# Patient Record
Sex: FEMALE | Race: WHITE | Employment: OTHER | URBAN - METROPOLITAN AREA
[De-identification: names, ages, dates, MRNs, and addresses within clinical notes are randomized per-mention and may not be internally consistent; named-entity substitution may affect disease eponyms.]

---

## 2017-02-10 ENCOUNTER — HOSPITAL ENCOUNTER (EMERGENCY)
Age: 37
Discharge: HOME | End: 2017-02-10
Attending: EMERGENCY MEDICINE
Payer: MEDICARE

## 2017-02-10 ENCOUNTER — APPOINTMENT (OUTPATIENT)
Dept: CT IMAGING | Age: 37
End: 2017-02-10
Payer: MEDICARE

## 2017-02-10 VITALS
TEMPERATURE: 97.7 F | SYSTOLIC BLOOD PRESSURE: 147 MMHG | OXYGEN SATURATION: 100 % | HEART RATE: 93 BPM | DIASTOLIC BLOOD PRESSURE: 86 MMHG | RESPIRATION RATE: 20 BRPM

## 2017-02-10 DIAGNOSIS — I10 ESSENTIAL HYPERTENSION: ICD-10-CM

## 2017-02-10 DIAGNOSIS — L03.311 CELLULITIS, ABDOMINAL WALL: Primary | ICD-10-CM

## 2017-02-10 DIAGNOSIS — K45.8 OTHER SPECIFIED ABDOMINAL HERNIA WITHOUT OBSTRUCTION OR GANGRENE: ICD-10-CM

## 2017-02-10 LAB
-: ABNORMAL
ABSOLUTE EOS #: 0.12 K/UL (ref 0–0.4)
ABSOLUTE LYMPH #: 1.28 K/UL (ref 1–4.8)
ABSOLUTE MONO #: 0.64 K/UL (ref 0.1–0.8)
ALBUMIN SERPL-MCNC: 3.7 G/DL (ref 3.5–5.2)
ALBUMIN/GLOBULIN RATIO: 1.2 (ref 1–2.5)
ALP BLD-CCNC: 138 U/L (ref 35–104)
ALT SERPL-CCNC: 23 U/L (ref 5–33)
AMORPHOUS: ABNORMAL
ANION GAP SERPL CALCULATED.3IONS-SCNC: 14 MMOL/L (ref 9–17)
AST SERPL-CCNC: 19 U/L
BACTERIA: ABNORMAL
BASOPHILS # BLD: 0 % (ref 0–2)
BASOPHILS ABSOLUTE: 0 K/UL (ref 0–0.2)
BILIRUB SERPL-MCNC: 0.2 MG/DL (ref 0.3–1.2)
BILIRUBIN URINE: NEGATIVE
BUN BLDV-MCNC: 14 MG/DL (ref 6–20)
BUN/CREAT BLD: ABNORMAL (ref 9–20)
CALCIUM SERPL-MCNC: 8.9 MG/DL (ref 8.6–10.4)
CASTS UA: ABNORMAL /LPF (ref 0–8)
CHLORIDE BLD-SCNC: 104 MMOL/L (ref 98–107)
CO2: 22 MMOL/L (ref 20–31)
COLOR: YELLOW
CREAT SERPL-MCNC: 0.58 MG/DL (ref 0.5–0.9)
CRYSTALS, UA: ABNORMAL /HPF
DIFFERENTIAL TYPE: ABNORMAL
EOSINOPHILS RELATIVE PERCENT: 2 % (ref 1–4)
EPITHELIAL CELLS UA: ABNORMAL /HPF (ref 0–5)
GFR AFRICAN AMERICAN: >60 ML/MIN
GFR NON-AFRICAN AMERICAN: >60 ML/MIN
GFR SERPL CREATININE-BSD FRML MDRD: ABNORMAL ML/MIN/{1.73_M2}
GFR SERPL CREATININE-BSD FRML MDRD: ABNORMAL ML/MIN/{1.73_M2}
GLUCOSE BLD-MCNC: 207 MG/DL (ref 70–99)
GLUCOSE URINE: NEGATIVE
HCT VFR BLD CALC: 40.1 % (ref 36–46)
HEMOGLOBIN: 13.4 G/DL (ref 12–16)
KETONES, URINE: NEGATIVE
LACTIC ACID, WHOLE BLOOD: 1.3 MMOL/L (ref 0.7–2.1)
LACTIC ACID: NORMAL MMOL/L
LEUKOCYTE ESTERASE, URINE: ABNORMAL
LIPASE: 26 U/L (ref 13–60)
LYMPHOCYTES # BLD: 22 % (ref 24–44)
MCH RBC QN AUTO: 28.3 PG (ref 26–34)
MCHC RBC AUTO-ENTMCNC: 33.4 G/DL (ref 31–37)
MCV RBC AUTO: 84.7 FL (ref 80–100)
MONOCYTES # BLD: 11 % (ref 1–7)
MORPHOLOGY: ABNORMAL
MUCUS: ABNORMAL
NITRITE, URINE: NEGATIVE
OTHER OBSERVATIONS UA: ABNORMAL
PDW BLD-RTO: 19.8 % (ref 12.5–15.4)
PH UA: 5.5 (ref 5–8)
PLATELET # BLD: 234 K/UL (ref 140–450)
PLATELET ESTIMATE: ABNORMAL
PMV BLD AUTO: 10.9 FL (ref 6–12)
POTASSIUM SERPL-SCNC: 4.6 MMOL/L (ref 3.7–5.3)
PROTEIN UA: NEGATIVE
RBC # BLD: 4.74 M/UL (ref 4–5.2)
RBC # BLD: ABNORMAL 10*6/UL
RBC UA: ABNORMAL /HPF (ref 0–4)
RENAL EPITHELIAL, UA: ABNORMAL /HPF
SEG NEUTROPHILS: 65 % (ref 36–66)
SEGMENTED NEUTROPHILS ABSOLUTE COUNT: 3.76 K/UL (ref 1.8–7.7)
SODIUM BLD-SCNC: 140 MMOL/L (ref 135–144)
SPECIFIC GRAVITY UA: 1.01 (ref 1–1.03)
TOTAL PROTEIN: 6.9 G/DL (ref 6.4–8.3)
TRICHOMONAS: ABNORMAL
TURBIDITY: CLEAR
URINE HGB: NEGATIVE
UROBILINOGEN, URINE: NORMAL
WBC # BLD: 5.8 K/UL (ref 3.5–11)
WBC # BLD: ABNORMAL 10*3/UL
WBC UA: ABNORMAL /HPF (ref 0–5)
YEAST: ABNORMAL

## 2017-02-10 PROCEDURE — 99284 EMERGENCY DEPT VISIT MOD MDM: CPT

## 2017-02-10 PROCEDURE — 6360000002 HC RX W HCPCS: Performed by: EMERGENCY MEDICINE

## 2017-02-10 PROCEDURE — 80053 COMPREHEN METABOLIC PANEL: CPT

## 2017-02-10 PROCEDURE — 74177 CT ABD & PELVIS W/CONTRAST: CPT

## 2017-02-10 PROCEDURE — 83605 ASSAY OF LACTIC ACID: CPT

## 2017-02-10 PROCEDURE — 83690 ASSAY OF LIPASE: CPT

## 2017-02-10 PROCEDURE — 87186 SC STD MICRODIL/AGAR DIL: CPT

## 2017-02-10 PROCEDURE — 2580000003 HC RX 258: Performed by: EMERGENCY MEDICINE

## 2017-02-10 PROCEDURE — 87086 URINE CULTURE/COLONY COUNT: CPT

## 2017-02-10 PROCEDURE — 86403 PARTICLE AGGLUT ANTBDY SCRN: CPT

## 2017-02-10 PROCEDURE — 6360000004 HC RX CONTRAST MEDICATION: Performed by: EMERGENCY MEDICINE

## 2017-02-10 PROCEDURE — 85025 COMPLETE CBC W/AUTO DIFF WBC: CPT

## 2017-02-10 PROCEDURE — 81001 URINALYSIS AUTO W/SCOPE: CPT

## 2017-02-10 RX ORDER — CLINDAMYCIN HYDROCHLORIDE 150 MG/1
300 CAPSULE ORAL ONCE
Status: DISCONTINUED | OUTPATIENT
Start: 2017-02-10 | End: 2017-02-10 | Stop reason: HOSPADM

## 2017-02-10 RX ORDER — 0.9 % SODIUM CHLORIDE 0.9 %
1000 INTRAVENOUS SOLUTION INTRAVENOUS ONCE
Status: COMPLETED | OUTPATIENT
Start: 2017-02-10 | End: 2017-02-10

## 2017-02-10 RX ORDER — CLINDAMYCIN HYDROCHLORIDE 150 MG/1
300 CAPSULE ORAL 4 TIMES DAILY
Qty: 28 CAPSULE | Refills: 0 | Status: SHIPPED | OUTPATIENT
Start: 2017-02-10 | End: 2017-02-17

## 2017-02-10 RX ORDER — OXYCODONE HYDROCHLORIDE AND ACETAMINOPHEN 5; 325 MG/1; MG/1
1-2 TABLET ORAL EVERY 8 HOURS PRN
Qty: 5 TABLET | Refills: 0 | Status: SHIPPED | OUTPATIENT
Start: 2017-02-10 | End: 2017-02-13

## 2017-02-10 RX ORDER — PROMETHAZINE HYDROCHLORIDE 25 MG/ML
12.5 INJECTION, SOLUTION INTRAMUSCULAR; INTRAVENOUS ONCE
Status: COMPLETED | OUTPATIENT
Start: 2017-02-10 | End: 2017-02-10

## 2017-02-10 RX ORDER — PROMETHAZINE HYDROCHLORIDE 25 MG/ML
25 INJECTION, SOLUTION INTRAMUSCULAR; INTRAVENOUS ONCE
Status: COMPLETED | OUTPATIENT
Start: 2017-02-10 | End: 2017-02-10

## 2017-02-10 RX ORDER — PROMETHAZINE HYDROCHLORIDE 25 MG/1
25 SUPPOSITORY RECTAL EVERY 8 HOURS PRN
Qty: 10 SUPPOSITORY | Refills: 0 | Status: ON HOLD | OUTPATIENT
Start: 2017-02-10 | End: 2017-04-10 | Stop reason: HOSPADM

## 2017-02-10 RX ADMIN — IOHEXOL 130 ML: 350 INJECTION, SOLUTION INTRAVENOUS at 13:21

## 2017-02-10 RX ADMIN — SODIUM CHLORIDE 1000 ML: 9 INJECTION, SOLUTION INTRAVENOUS at 10:52

## 2017-02-10 RX ADMIN — HYDROMORPHONE HYDROCHLORIDE 1 MG: 1 INJECTION, SOLUTION INTRAMUSCULAR; INTRAVENOUS; SUBCUTANEOUS at 10:52

## 2017-02-10 RX ADMIN — PROMETHAZINE HYDROCHLORIDE 25 MG: 25 INJECTION INTRAMUSCULAR; INTRAVENOUS at 10:52

## 2017-02-10 RX ADMIN — PROMETHAZINE HYDROCHLORIDE 12.5 MG: 25 INJECTION, SOLUTION INTRAMUSCULAR; INTRAVENOUS at 14:39

## 2017-02-10 RX ADMIN — HYDROMORPHONE HYDROCHLORIDE 1 MG: 1 INJECTION, SOLUTION INTRAMUSCULAR; INTRAVENOUS; SUBCUTANEOUS at 14:39

## 2017-02-10 RX ADMIN — HYDROMORPHONE HYDROCHLORIDE 1 MG: 1 INJECTION, SOLUTION INTRAMUSCULAR; INTRAVENOUS; SUBCUTANEOUS at 12:23

## 2017-02-10 ASSESSMENT — PAIN SCALES - GENERAL
PAINLEVEL_OUTOF10: 8
PAINLEVEL_OUTOF10: 6
PAINLEVEL_OUTOF10: 6
PAINLEVEL_OUTOF10: 9

## 2017-02-10 ASSESSMENT — ENCOUNTER SYMPTOMS
DIARRHEA: 0
SHORTNESS OF BREATH: 0
NAUSEA: 1
CONSTIPATION: 0
VOMITING: 1
ABDOMINAL PAIN: 1
RHINORRHEA: 0
TROUBLE SWALLOWING: 0
BACK PAIN: 0

## 2017-02-10 ASSESSMENT — PAIN DESCRIPTION - DESCRIPTORS: DESCRIPTORS: ACHING;BURNING;CONSTANT

## 2017-02-10 ASSESSMENT — PAIN DESCRIPTION - LOCATION: LOCATION: ABDOMEN

## 2017-02-10 ASSESSMENT — PAIN DESCRIPTION - ONSET: ONSET: PROGRESSIVE

## 2017-02-10 ASSESSMENT — PAIN DESCRIPTION - PAIN TYPE: TYPE: ACUTE PAIN;CHRONIC PAIN

## 2017-02-10 ASSESSMENT — PAIN DESCRIPTION - ORIENTATION: ORIENTATION: LEFT;LOWER;MID

## 2017-02-10 ASSESSMENT — PAIN DESCRIPTION - FREQUENCY: FREQUENCY: CONTINUOUS

## 2017-02-11 LAB
CULTURE: ABNORMAL
CULTURE: ABNORMAL
Lab: ABNORMAL
ORGANISM: ABNORMAL
SPECIMEN DESCRIPTION: ABNORMAL
STATUS: ABNORMAL

## 2017-02-13 ENCOUNTER — HOSPITAL ENCOUNTER (EMERGENCY)
Age: 37
Discharge: HOME | End: 2017-02-13
Attending: EMERGENCY MEDICINE
Payer: MEDICARE

## 2017-02-13 VITALS
HEART RATE: 96 BPM | SYSTOLIC BLOOD PRESSURE: 147 MMHG | DIASTOLIC BLOOD PRESSURE: 93 MMHG | HEIGHT: 69 IN | BODY MASS INDEX: 43.4 KG/M2 | WEIGHT: 293 LBS | OXYGEN SATURATION: 97 % | RESPIRATION RATE: 18 BRPM | TEMPERATURE: 97.3 F

## 2017-02-13 DIAGNOSIS — R10.9 ABDOMINAL PAIN, UNSPECIFIED LOCATION: Primary | ICD-10-CM

## 2017-02-13 PROCEDURE — 99283 EMERGENCY DEPT VISIT LOW MDM: CPT

## 2017-02-13 PROCEDURE — 6370000000 HC RX 637 (ALT 250 FOR IP): Performed by: EMERGENCY MEDICINE

## 2017-02-13 RX ORDER — OXYCODONE HYDROCHLORIDE AND ACETAMINOPHEN 5; 325 MG/1; MG/1
1 TABLET ORAL EVERY 8 HOURS PRN
Qty: 6 TABLET | Refills: 0 | Status: SHIPPED | OUTPATIENT
Start: 2017-02-13 | End: 2017-03-09

## 2017-02-13 RX ORDER — OXYCODONE HYDROCHLORIDE AND ACETAMINOPHEN 5; 325 MG/1; MG/1
2 TABLET ORAL ONCE
Status: COMPLETED | OUTPATIENT
Start: 2017-02-13 | End: 2017-02-13

## 2017-02-13 RX ADMIN — OXYCODONE HYDROCHLORIDE AND ACETAMINOPHEN 2 TABLET: 5; 325 TABLET ORAL at 11:59

## 2017-02-13 ASSESSMENT — ENCOUNTER SYMPTOMS
NAUSEA: 1
VOMITING: 1
RESPIRATORY NEGATIVE: 1
ABDOMINAL PAIN: 1
EYES NEGATIVE: 1

## 2017-02-13 ASSESSMENT — PAIN DESCRIPTION - PAIN TYPE: TYPE: ACUTE PAIN

## 2017-02-13 ASSESSMENT — PAIN DESCRIPTION - LOCATION: LOCATION: ABDOMEN

## 2017-02-13 ASSESSMENT — PAIN SCALES - GENERAL
PAINLEVEL_OUTOF10: 8
PAINLEVEL_OUTOF10: 8

## 2017-02-14 ENCOUNTER — HOSPITAL ENCOUNTER (OUTPATIENT)
Age: 37
Discharge: HOME | End: 2017-02-14
Payer: MEDICARE

## 2017-02-14 LAB
ABSOLUTE EOS #: 0.1 K/UL (ref 0–0.4)
ABSOLUTE LYMPH #: 1.3 K/UL (ref 1–4.8)
ABSOLUTE MONO #: 0.3 K/UL (ref 0.1–1.2)
BASOPHILS # BLD: 1 % (ref 0–2)
BASOPHILS ABSOLUTE: 0.1 K/UL (ref 0–0.2)
DIFFERENTIAL TYPE: ABNORMAL
EOSINOPHILS RELATIVE PERCENT: 2 % (ref 1–4)
FERRITIN: 88 UG/L (ref 13–150)
HCT VFR BLD CALC: 40.7 % (ref 36–46)
HEMOGLOBIN: 13.2 G/DL (ref 12–16)
LYMPHOCYTES # BLD: 22 % (ref 24–44)
MCH RBC QN AUTO: 28.3 PG (ref 26–34)
MCHC RBC AUTO-ENTMCNC: 32.5 G/DL (ref 31–37)
MCV RBC AUTO: 86.9 FL (ref 80–100)
MONOCYTES # BLD: 6 % (ref 2–11)
PDW BLD-RTO: 18.5 % (ref 12.5–15.4)
PLATELET # BLD: 244 K/UL (ref 140–450)
PLATELET ESTIMATE: ABNORMAL
PMV BLD AUTO: 9.9 FL (ref 6–12)
RBC # BLD: 4.68 M/UL (ref 4–5.2)
RBC # BLD: ABNORMAL 10*6/UL
SEG NEUTROPHILS: 69 % (ref 36–66)
SEGMENTED NEUTROPHILS ABSOLUTE COUNT: 4.1 K/UL (ref 1.8–7.7)
WBC # BLD: 5.9 K/UL (ref 3.5–11)
WBC # BLD: ABNORMAL 10*3/UL

## 2017-02-14 PROCEDURE — 85025 COMPLETE CBC W/AUTO DIFF WBC: CPT

## 2017-02-14 PROCEDURE — 82728 ASSAY OF FERRITIN: CPT

## 2017-02-14 PROCEDURE — 36415 COLL VENOUS BLD VENIPUNCTURE: CPT

## 2017-02-28 ENCOUNTER — HOSPITAL ENCOUNTER (OUTPATIENT)
Age: 37
Discharge: HOME | End: 2017-02-28
Payer: MEDICARE

## 2017-02-28 LAB
ABSOLUTE EOS #: 0.1 K/UL (ref 0–0.4)
ABSOLUTE LYMPH #: 1.3 K/UL (ref 1–4.8)
ABSOLUTE MONO #: 0.5 K/UL (ref 0.1–1.2)
BASOPHILS # BLD: 1 % (ref 0–2)
BASOPHILS ABSOLUTE: 0 K/UL (ref 0–0.2)
DIFFERENTIAL TYPE: ABNORMAL
EOSINOPHILS RELATIVE PERCENT: 2 % (ref 1–4)
FERRITIN: 66 UG/L (ref 13–150)
HCT VFR BLD CALC: 43.9 % (ref 36–46)
HEMOGLOBIN: 14.3 G/DL (ref 12–16)
LYMPHOCYTES # BLD: 20 % (ref 24–44)
MCH RBC QN AUTO: 29.2 PG (ref 26–34)
MCHC RBC AUTO-ENTMCNC: 32.5 G/DL (ref 31–37)
MCV RBC AUTO: 89.8 FL (ref 80–100)
MONOCYTES # BLD: 8 % (ref 2–11)
PDW BLD-RTO: 15.9 % (ref 12.5–15.4)
PLATELET # BLD: 223 K/UL (ref 140–450)
PLATELET ESTIMATE: ABNORMAL
PMV BLD AUTO: 10.4 FL (ref 6–12)
RBC # BLD: 4.89 M/UL (ref 4–5.2)
RBC # BLD: ABNORMAL 10*6/UL
SEG NEUTROPHILS: 69 % (ref 36–66)
SEGMENTED NEUTROPHILS ABSOLUTE COUNT: 4.7 K/UL (ref 1.8–7.7)
WBC # BLD: 6.7 K/UL (ref 3.5–11)
WBC # BLD: ABNORMAL 10*3/UL

## 2017-02-28 PROCEDURE — 36415 COLL VENOUS BLD VENIPUNCTURE: CPT

## 2017-02-28 PROCEDURE — 82728 ASSAY OF FERRITIN: CPT

## 2017-02-28 PROCEDURE — 85025 COMPLETE CBC W/AUTO DIFF WBC: CPT

## 2017-03-09 ENCOUNTER — HOSPITAL ENCOUNTER (EMERGENCY)
Age: 37
Discharge: HOME | End: 2017-03-09
Attending: EMERGENCY MEDICINE
Payer: MEDICARE

## 2017-03-09 VITALS
SYSTOLIC BLOOD PRESSURE: 177 MMHG | RESPIRATION RATE: 16 BRPM | HEIGHT: 69 IN | TEMPERATURE: 97.4 F | BODY MASS INDEX: 43.4 KG/M2 | WEIGHT: 293 LBS | HEART RATE: 96 BPM | DIASTOLIC BLOOD PRESSURE: 108 MMHG | OXYGEN SATURATION: 100 %

## 2017-03-09 DIAGNOSIS — T30.0 BURN: Primary | ICD-10-CM

## 2017-03-09 PROCEDURE — G0381 LEV 2 HOSP TYPE B ED VISIT: HCPCS

## 2017-03-09 RX ORDER — OXYCODONE HYDROCHLORIDE AND ACETAMINOPHEN 5; 325 MG/1; MG/1
1-2 TABLET ORAL EVERY 6 HOURS PRN
Qty: 10 TABLET | Refills: 0 | Status: SHIPPED | OUTPATIENT
Start: 2017-03-09 | End: 2017-03-17

## 2017-03-09 ASSESSMENT — ENCOUNTER SYMPTOMS
COUGH: 0
DIARRHEA: 0
ABDOMINAL PAIN: 0
NAUSEA: 0
SHORTNESS OF BREATH: 0
VOMITING: 0

## 2017-03-09 ASSESSMENT — PAIN DESCRIPTION - ORIENTATION: ORIENTATION: RIGHT

## 2017-03-09 ASSESSMENT — PAIN DESCRIPTION - PAIN TYPE: TYPE: ACUTE PAIN

## 2017-03-09 ASSESSMENT — PAIN DESCRIPTION - PROGRESSION: CLINICAL_PROGRESSION: GRADUALLY WORSENING

## 2017-03-09 ASSESSMENT — PAIN DESCRIPTION - FREQUENCY: FREQUENCY: CONTINUOUS

## 2017-03-09 ASSESSMENT — PAIN SCALES - GENERAL: PAINLEVEL_OUTOF10: 8

## 2017-03-09 ASSESSMENT — PAIN DESCRIPTION - LOCATION: LOCATION: HAND

## 2017-03-09 ASSESSMENT — PAIN DESCRIPTION - DESCRIPTORS: DESCRIPTORS: BURNING;TINGLING

## 2017-03-09 ASSESSMENT — PAIN DESCRIPTION - ONSET: ONSET: SUDDEN

## 2017-03-11 ENCOUNTER — HOSPITAL ENCOUNTER (EMERGENCY)
Age: 37
Discharge: HOME | End: 2017-03-11
Attending: EMERGENCY MEDICINE
Payer: MEDICARE

## 2017-03-11 VITALS
HEART RATE: 91 BPM | SYSTOLIC BLOOD PRESSURE: 161 MMHG | DIASTOLIC BLOOD PRESSURE: 97 MMHG | RESPIRATION RATE: 17 BRPM | TEMPERATURE: 96.6 F | OXYGEN SATURATION: 100 %

## 2017-03-11 DIAGNOSIS — Z51.89 ENCOUNTER FOR WOUND RE-CHECK: Primary | ICD-10-CM

## 2017-03-11 PROCEDURE — 6370000000 HC RX 637 (ALT 250 FOR IP): Performed by: STUDENT IN AN ORGANIZED HEALTH CARE EDUCATION/TRAINING PROGRAM

## 2017-03-11 PROCEDURE — 99282 EMERGENCY DEPT VISIT SF MDM: CPT

## 2017-03-11 RX ORDER — OXYCODONE HYDROCHLORIDE AND ACETAMINOPHEN 5; 325 MG/1; MG/1
1 TABLET ORAL ONCE
Status: COMPLETED | OUTPATIENT
Start: 2017-03-11 | End: 2017-03-11

## 2017-03-11 RX ORDER — OXYCODONE HYDROCHLORIDE AND ACETAMINOPHEN 5; 325 MG/1; MG/1
1 TABLET ORAL EVERY 4 HOURS PRN
Qty: 10 TABLET | Refills: 0 | Status: SHIPPED | OUTPATIENT
Start: 2017-03-11 | End: 2017-03-17

## 2017-03-11 RX ORDER — OXYCODONE HYDROCHLORIDE AND ACETAMINOPHEN 5; 325 MG/1; MG/1
TABLET ORAL
Status: DISCONTINUED
Start: 2017-03-11 | End: 2017-03-11 | Stop reason: HOSPADM

## 2017-03-11 RX ADMIN — OXYCODONE HYDROCHLORIDE AND ACETAMINOPHEN 1 TABLET: 5; 325 TABLET ORAL at 04:22

## 2017-03-11 ASSESSMENT — PAIN SCALES - GENERAL: PAINLEVEL_OUTOF10: 8

## 2017-03-11 ASSESSMENT — ENCOUNTER SYMPTOMS
ABDOMINAL PAIN: 0
SHORTNESS OF BREATH: 0

## 2017-03-15 RX ORDER — DIPHENHYDRAMINE HYDROCHLORIDE 50 MG/ML
50 INJECTION INTRAMUSCULAR; INTRAVENOUS ONCE
Status: CANCELLED | OUTPATIENT
Start: 2017-03-22 | End: 2017-03-22

## 2017-03-15 RX ORDER — HEPARIN SODIUM (PORCINE) LOCK FLUSH IV SOLN 100 UNIT/ML 100 UNIT/ML
500 SOLUTION INTRAVENOUS PRN
Status: CANCELLED | OUTPATIENT
Start: 2017-03-22

## 2017-03-15 RX ORDER — 0.9 % SODIUM CHLORIDE 0.9 %
10 VIAL (ML) INJECTION ONCE
Status: CANCELLED | OUTPATIENT
Start: 2017-03-22 | End: 2017-03-22

## 2017-03-15 RX ORDER — METHYLPREDNISOLONE SODIUM SUCCINATE 125 MG/2ML
125 INJECTION, POWDER, LYOPHILIZED, FOR SOLUTION INTRAMUSCULAR; INTRAVENOUS ONCE
Status: CANCELLED | OUTPATIENT
Start: 2017-03-22 | End: 2017-03-22

## 2017-03-15 RX ORDER — SODIUM CHLORIDE 0.9 % (FLUSH) 0.9 %
5 SYRINGE (ML) INJECTION PRN
Status: CANCELLED | OUTPATIENT
Start: 2017-03-22

## 2017-03-15 RX ORDER — SODIUM CHLORIDE 0.9 % (FLUSH) 0.9 %
10 SYRINGE (ML) INJECTION PRN
Status: CANCELLED | OUTPATIENT
Start: 2017-03-22

## 2017-03-15 RX ORDER — SODIUM CHLORIDE 9 MG/ML
INJECTION, SOLUTION INTRAVENOUS ONCE
Status: CANCELLED | OUTPATIENT
Start: 2017-03-22 | End: 2017-03-22

## 2017-03-15 RX ORDER — SODIUM CHLORIDE 9 MG/ML
100 INJECTION, SOLUTION INTRAVENOUS CONTINUOUS
Status: CANCELLED | OUTPATIENT
Start: 2017-03-22

## 2017-03-17 ENCOUNTER — APPOINTMENT (OUTPATIENT)
Dept: CT IMAGING | Age: 37
End: 2017-03-17
Payer: MEDICARE

## 2017-03-17 ENCOUNTER — HOSPITAL ENCOUNTER (EMERGENCY)
Age: 37
Discharge: HOME | End: 2017-03-17
Attending: EMERGENCY MEDICINE
Payer: MEDICARE

## 2017-03-17 VITALS
TEMPERATURE: 98.8 F | RESPIRATION RATE: 21 BRPM | SYSTOLIC BLOOD PRESSURE: 149 MMHG | DIASTOLIC BLOOD PRESSURE: 87 MMHG | OXYGEN SATURATION: 98 % | HEART RATE: 93 BPM

## 2017-03-17 DIAGNOSIS — R07.9 CHEST PAIN, UNSPECIFIED TYPE: Primary | ICD-10-CM

## 2017-03-17 LAB
ABSOLUTE EOS #: 0 K/UL (ref 0–0.4)
ABSOLUTE LYMPH #: 1.2 K/UL (ref 1–4.8)
ABSOLUTE MONO #: 0.4 K/UL (ref 0.1–1.2)
ANION GAP SERPL CALCULATED.3IONS-SCNC: 16 MMOL/L (ref 9–17)
BASOPHILS # BLD: 1 % (ref 0–2)
BASOPHILS ABSOLUTE: 0.1 K/UL (ref 0–0.2)
BUN BLDV-MCNC: 7 MG/DL (ref 6–20)
BUN/CREAT BLD: ABNORMAL (ref 9–20)
CALCIUM SERPL-MCNC: 9 MG/DL (ref 8.6–10.4)
CHLORIDE BLD-SCNC: 97 MMOL/L (ref 98–107)
CO2: 22 MMOL/L (ref 20–31)
CREAT SERPL-MCNC: 0.44 MG/DL (ref 0.5–0.9)
DIFFERENTIAL TYPE: ABNORMAL
EOSINOPHILS RELATIVE PERCENT: 1 % (ref 1–4)
GFR AFRICAN AMERICAN: >60 ML/MIN
GFR NON-AFRICAN AMERICAN: >60 ML/MIN
GFR SERPL CREATININE-BSD FRML MDRD: ABNORMAL ML/MIN/{1.73_M2}
GFR SERPL CREATININE-BSD FRML MDRD: ABNORMAL ML/MIN/{1.73_M2}
GLUCOSE BLD-MCNC: 316 MG/DL (ref 70–99)
HCT VFR BLD CALC: 42.8 % (ref 36–46)
HEMOGLOBIN: 14.4 G/DL (ref 12–16)
LYMPHOCYTES # BLD: 16 % (ref 24–44)
MCH RBC QN AUTO: 29.6 PG (ref 26–34)
MCHC RBC AUTO-ENTMCNC: 33.7 G/DL (ref 31–37)
MCV RBC AUTO: 87.6 FL (ref 80–100)
MONOCYTES # BLD: 5 % (ref 2–11)
PDW BLD-RTO: 15 % (ref 12.5–15.4)
PLATELET # BLD: 267 K/UL (ref 140–450)
PLATELET ESTIMATE: ABNORMAL
PMV BLD AUTO: 10.1 FL (ref 6–12)
POC TROPONIN I: 0 NG/ML (ref 0–0.1)
POC TROPONIN INTERP: NORMAL
POTASSIUM SERPL-SCNC: 4.2 MMOL/L (ref 3.7–5.3)
RBC # BLD: 4.88 M/UL (ref 4–5.2)
RBC # BLD: ABNORMAL 10*6/UL
SEG NEUTROPHILS: 77 % (ref 36–66)
SEGMENTED NEUTROPHILS ABSOLUTE COUNT: 5.7 K/UL (ref 1.8–7.7)
SODIUM BLD-SCNC: 135 MMOL/L (ref 135–144)
TROPONIN INTERP: NORMAL
TROPONIN T: <0.03 NG/ML
WBC # BLD: 7.4 K/UL (ref 3.5–11)
WBC # BLD: ABNORMAL 10*3/UL

## 2017-03-17 PROCEDURE — 85025 COMPLETE CBC W/AUTO DIFF WBC: CPT

## 2017-03-17 PROCEDURE — 93005 ELECTROCARDIOGRAM TRACING: CPT

## 2017-03-17 PROCEDURE — 96376 TX/PRO/DX INJ SAME DRUG ADON: CPT

## 2017-03-17 PROCEDURE — 99285 EMERGENCY DEPT VISIT HI MDM: CPT

## 2017-03-17 PROCEDURE — 2580000003 HC RX 258: Performed by: EMERGENCY MEDICINE

## 2017-03-17 PROCEDURE — 6370000000 HC RX 637 (ALT 250 FOR IP): Performed by: EMERGENCY MEDICINE

## 2017-03-17 PROCEDURE — 96375 TX/PRO/DX INJ NEW DRUG ADDON: CPT

## 2017-03-17 PROCEDURE — 6360000004 HC RX CONTRAST MEDICATION: Performed by: EMERGENCY MEDICINE

## 2017-03-17 PROCEDURE — 72125 CT NECK SPINE W/O DYE: CPT

## 2017-03-17 PROCEDURE — 96374 THER/PROPH/DIAG INJ IV PUSH: CPT

## 2017-03-17 PROCEDURE — 6360000002 HC RX W HCPCS: Performed by: EMERGENCY MEDICINE

## 2017-03-17 PROCEDURE — 84484 ASSAY OF TROPONIN QUANT: CPT

## 2017-03-17 PROCEDURE — 70450 CT HEAD/BRAIN W/O DYE: CPT

## 2017-03-17 PROCEDURE — 71260 CT THORAX DX C+: CPT

## 2017-03-17 PROCEDURE — 80048 BASIC METABOLIC PNL TOTAL CA: CPT

## 2017-03-17 RX ORDER — ASPIRIN 81 MG/1
324 TABLET, CHEWABLE ORAL ONCE
Status: COMPLETED | OUTPATIENT
Start: 2017-03-17 | End: 2017-03-17

## 2017-03-17 RX ORDER — PROMETHAZINE HYDROCHLORIDE 25 MG/ML
12.5 INJECTION, SOLUTION INTRAMUSCULAR; INTRAVENOUS ONCE
Status: COMPLETED | OUTPATIENT
Start: 2017-03-17 | End: 2017-03-17

## 2017-03-17 RX ORDER — 0.9 % SODIUM CHLORIDE 0.9 %
1000 INTRAVENOUS SOLUTION INTRAVENOUS ONCE
Status: COMPLETED | OUTPATIENT
Start: 2017-03-17 | End: 2017-03-17

## 2017-03-17 RX ORDER — AMLODIPINE BESYLATE 10 MG/1
10 TABLET ORAL ONCE
Status: COMPLETED | OUTPATIENT
Start: 2017-03-17 | End: 2017-03-17

## 2017-03-17 RX ORDER — BUMETANIDE 1 MG/1
2 TABLET ORAL ONCE
Status: COMPLETED | OUTPATIENT
Start: 2017-03-17 | End: 2017-03-17

## 2017-03-17 RX ORDER — LABETALOL HYDROCHLORIDE 5 MG/ML
10 INJECTION, SOLUTION INTRAVENOUS ONCE
Status: DISCONTINUED | OUTPATIENT
Start: 2017-03-17 | End: 2017-03-17

## 2017-03-17 RX ORDER — HYDROCODONE BITARTRATE AND ACETAMINOPHEN 5; 325 MG/1; MG/1
1 TABLET ORAL EVERY 8 HOURS PRN
Qty: 8 TABLET | Refills: 0 | Status: SHIPPED | OUTPATIENT
Start: 2017-03-17 | End: 2017-03-24

## 2017-03-17 RX ADMIN — PROMETHAZINE HYDROCHLORIDE 12.5 MG: 25 INJECTION, SOLUTION INTRAMUSCULAR; INTRAVENOUS at 14:03

## 2017-03-17 RX ADMIN — PROMETHAZINE HYDROCHLORIDE 12.5 MG: 25 INJECTION, SOLUTION INTRAMUSCULAR; INTRAVENOUS at 12:01

## 2017-03-17 RX ADMIN — ASPIRIN 81 MG 324 MG: 81 TABLET ORAL at 12:01

## 2017-03-17 RX ADMIN — BUMETANIDE 2 MG: 1 TABLET ORAL at 14:03

## 2017-03-17 RX ADMIN — IOHEXOL 100 ML: 350 INJECTION, SOLUTION INTRAVENOUS at 12:59

## 2017-03-17 RX ADMIN — HYDROMORPHONE HYDROCHLORIDE 1 MG: 1 INJECTION, SOLUTION INTRAMUSCULAR; INTRAVENOUS; SUBCUTANEOUS at 14:03

## 2017-03-17 RX ADMIN — AMLODIPINE BESYLATE 10 MG: 10 TABLET ORAL at 14:03

## 2017-03-17 RX ADMIN — HYDROMORPHONE HYDROCHLORIDE 0.5 MG: 1 INJECTION, SOLUTION INTRAMUSCULAR; INTRAVENOUS; SUBCUTANEOUS at 12:01

## 2017-03-17 RX ADMIN — SODIUM CHLORIDE 1000 ML: 9 INJECTION, SOLUTION INTRAVENOUS at 12:04

## 2017-03-17 ASSESSMENT — PAIN DESCRIPTION - ONSET: ONSET: PROGRESSIVE

## 2017-03-17 ASSESSMENT — ENCOUNTER SYMPTOMS
VOMITING: 1
RHINORRHEA: 0
ABDOMINAL PAIN: 0
WHEEZING: 0
DIARRHEA: 0
NAUSEA: 1
SHORTNESS OF BREATH: 1
EYE REDNESS: 0
COUGH: 0
EYE DISCHARGE: 0

## 2017-03-17 ASSESSMENT — PAIN DESCRIPTION - FREQUENCY: FREQUENCY: CONTINUOUS

## 2017-03-17 ASSESSMENT — PAIN SCALES - GENERAL
PAINLEVEL_OUTOF10: 7
PAINLEVEL_OUTOF10: 6
PAINLEVEL_OUTOF10: 9

## 2017-03-17 ASSESSMENT — HEART SCORE
ECG: 1
ECG: 1

## 2017-03-17 ASSESSMENT — PAIN DESCRIPTION - ORIENTATION: ORIENTATION: MID;LEFT;POSTERIOR

## 2017-03-17 ASSESSMENT — PAIN DESCRIPTION - PAIN TYPE: TYPE: ACUTE PAIN;CHRONIC PAIN

## 2017-03-18 LAB
EKG ATRIAL RATE: 105 BPM
EKG P AXIS: 54 DEGREES
EKG P-R INTERVAL: 144 MS
EKG Q-T INTERVAL: 368 MS
EKG QRS DURATION: 88 MS
EKG QTC CALCULATION (BAZETT): 486 MS
EKG R AXIS: 43 DEGREES
EKG T AXIS: 3 DEGREES
EKG VENTRICULAR RATE: 105 BPM

## 2017-03-22 ENCOUNTER — HOSPITAL ENCOUNTER (OUTPATIENT)
Dept: INFUSION THERAPY | Age: 37
Discharge: HOME | End: 2017-03-22
Payer: MEDICARE

## 2017-03-22 VITALS
RESPIRATION RATE: 18 BRPM | HEART RATE: 105 BPM | SYSTOLIC BLOOD PRESSURE: 147 MMHG | DIASTOLIC BLOOD PRESSURE: 92 MMHG | TEMPERATURE: 98 F

## 2017-03-22 DIAGNOSIS — D50.0 IRON DEFICIENCY ANEMIA DUE TO CHRONIC BLOOD LOSS: ICD-10-CM

## 2017-03-22 DIAGNOSIS — D68.61 ANTIPHOSPHOLIPID SYNDROME (HCC): ICD-10-CM

## 2017-03-22 PROCEDURE — 2580000003 HC RX 258: Performed by: INTERNAL MEDICINE

## 2017-03-22 PROCEDURE — 96365 THER/PROPH/DIAG IV INF INIT: CPT

## 2017-03-22 PROCEDURE — 6360000002 HC RX W HCPCS: Performed by: INTERNAL MEDICINE

## 2017-03-22 RX ORDER — SODIUM CHLORIDE 9 MG/ML
INJECTION, SOLUTION INTRAVENOUS ONCE
Status: CANCELLED | OUTPATIENT
Start: 2017-03-22 | End: 2017-03-22

## 2017-03-22 RX ORDER — METHYLPREDNISOLONE SODIUM SUCCINATE 125 MG/2ML
125 INJECTION, POWDER, LYOPHILIZED, FOR SOLUTION INTRAMUSCULAR; INTRAVENOUS ONCE
Status: CANCELLED | OUTPATIENT
Start: 2017-03-22 | End: 2017-03-22

## 2017-03-22 RX ORDER — HEPARIN SODIUM (PORCINE) LOCK FLUSH IV SOLN 100 UNIT/ML 100 UNIT/ML
500 SOLUTION INTRAVENOUS PRN
Status: CANCELLED | OUTPATIENT
Start: 2017-03-22

## 2017-03-22 RX ORDER — SODIUM CHLORIDE 0.9 % (FLUSH) 0.9 %
10 SYRINGE (ML) INJECTION PRN
Status: CANCELLED | OUTPATIENT
Start: 2017-03-22

## 2017-03-22 RX ORDER — SODIUM CHLORIDE 9 MG/ML
INJECTION, SOLUTION INTRAVENOUS ONCE
Status: COMPLETED | OUTPATIENT
Start: 2017-03-22 | End: 2017-03-22

## 2017-03-22 RX ORDER — SODIUM CHLORIDE 0.9 % (FLUSH) 0.9 %
5 SYRINGE (ML) INJECTION PRN
Status: CANCELLED | OUTPATIENT
Start: 2017-03-22

## 2017-03-22 RX ORDER — DIPHENHYDRAMINE HYDROCHLORIDE 50 MG/ML
50 INJECTION INTRAMUSCULAR; INTRAVENOUS ONCE
Status: CANCELLED | OUTPATIENT
Start: 2017-03-22 | End: 2017-03-22

## 2017-03-22 RX ORDER — 0.9 % SODIUM CHLORIDE 0.9 %
10 VIAL (ML) INJECTION ONCE
Status: CANCELLED | OUTPATIENT
Start: 2017-03-22 | End: 2017-03-22

## 2017-03-22 RX ORDER — SODIUM CHLORIDE 9 MG/ML
100 INJECTION, SOLUTION INTRAVENOUS CONTINUOUS
Status: CANCELLED | OUTPATIENT
Start: 2017-03-22

## 2017-03-22 RX ADMIN — SODIUM CHLORIDE: 9 INJECTION, SOLUTION INTRAVENOUS at 13:26

## 2017-03-22 RX ADMIN — FERUMOXYTOL 510 MG: 510 INJECTION INTRAVENOUS at 13:26

## 2017-03-22 NOTE — PROGRESS NOTES
Pt here for Feraheme infusion. Multiple attempts made for IV access by staff and by ED RN with ultrasound without success. Permission given by Dr Arvin Goncalves to access Rt breast and he ordered for IV to be kept in and pt to return Friday 3-24-17 for 2nd dose of Feraheme. Pt was treated without incident and discharged in stable condition. Pt will return in 2 days for dose 2 of Feraheme.

## 2017-03-24 ENCOUNTER — HOSPITAL ENCOUNTER (OUTPATIENT)
Dept: INFUSION THERAPY | Age: 37
Discharge: HOME | End: 2017-03-24
Payer: MEDICARE

## 2017-03-24 VITALS
HEART RATE: 96 BPM | DIASTOLIC BLOOD PRESSURE: 83 MMHG | RESPIRATION RATE: 18 BRPM | SYSTOLIC BLOOD PRESSURE: 138 MMHG | OXYGEN SATURATION: 99 % | TEMPERATURE: 98.2 F

## 2017-03-24 DIAGNOSIS — D50.0 IRON DEFICIENCY ANEMIA DUE TO CHRONIC BLOOD LOSS: ICD-10-CM

## 2017-03-24 DIAGNOSIS — D68.61 ANTIPHOSPHOLIPID SYNDROME (HCC): ICD-10-CM

## 2017-03-24 PROCEDURE — 96365 THER/PROPH/DIAG IV INF INIT: CPT

## 2017-03-24 PROCEDURE — 2580000003 HC RX 258: Performed by: INTERNAL MEDICINE

## 2017-03-24 PROCEDURE — 6360000002 HC RX W HCPCS: Performed by: INTERNAL MEDICINE

## 2017-03-24 RX ORDER — 0.9 % SODIUM CHLORIDE 0.9 %
10 VIAL (ML) INJECTION ONCE
Status: CANCELLED | OUTPATIENT
Start: 2017-03-24 | End: 2017-03-24

## 2017-03-24 RX ORDER — SODIUM CHLORIDE 0.9 % (FLUSH) 0.9 %
10 SYRINGE (ML) INJECTION PRN
Status: CANCELLED | OUTPATIENT
Start: 2017-03-24

## 2017-03-24 RX ORDER — DIPHENHYDRAMINE HYDROCHLORIDE 50 MG/ML
50 INJECTION INTRAMUSCULAR; INTRAVENOUS ONCE
Status: CANCELLED | OUTPATIENT
Start: 2017-03-24 | End: 2017-03-24

## 2017-03-24 RX ORDER — SODIUM CHLORIDE 0.9 % (FLUSH) 0.9 %
5 SYRINGE (ML) INJECTION PRN
Status: CANCELLED | OUTPATIENT
Start: 2017-03-24

## 2017-03-24 RX ORDER — METHYLPREDNISOLONE SODIUM SUCCINATE 125 MG/2ML
125 INJECTION, POWDER, LYOPHILIZED, FOR SOLUTION INTRAMUSCULAR; INTRAVENOUS ONCE
Status: CANCELLED | OUTPATIENT
Start: 2017-03-24 | End: 2017-03-24

## 2017-03-24 RX ORDER — SODIUM CHLORIDE 9 MG/ML
INJECTION, SOLUTION INTRAVENOUS ONCE
Status: COMPLETED | OUTPATIENT
Start: 2017-03-24 | End: 2017-03-24

## 2017-03-24 RX ORDER — SODIUM CHLORIDE 9 MG/ML
INJECTION, SOLUTION INTRAVENOUS ONCE
Status: CANCELLED | OUTPATIENT
Start: 2017-03-24 | End: 2017-03-24

## 2017-03-24 RX ORDER — SODIUM CHLORIDE 9 MG/ML
100 INJECTION, SOLUTION INTRAVENOUS CONTINUOUS
Status: CANCELLED | OUTPATIENT
Start: 2017-03-24

## 2017-03-24 RX ORDER — HEPARIN SODIUM (PORCINE) LOCK FLUSH IV SOLN 100 UNIT/ML 100 UNIT/ML
500 SOLUTION INTRAVENOUS PRN
Status: CANCELLED | OUTPATIENT
Start: 2017-03-24

## 2017-03-24 RX ORDER — SODIUM CHLORIDE 0.9 % (FLUSH) 0.9 %
10 SYRINGE (ML) INJECTION PRN
Status: ACTIVE | OUTPATIENT
Start: 2017-03-24 | End: 2017-03-25

## 2017-03-24 RX ADMIN — Medication 10 ML: at 11:28

## 2017-03-24 RX ADMIN — SODIUM CHLORIDE: 9 INJECTION, SOLUTION INTRAVENOUS at 11:27

## 2017-03-24 RX ADMIN — FERUMOXYTOL 510 MG: 510 INJECTION INTRAVENOUS at 11:28

## 2017-03-24 ASSESSMENT — PAIN SCALES - GENERAL
PAINLEVEL_OUTOF10: 0

## 2017-03-24 NOTE — PROGRESS NOTES
Pt here today per Md order to receive second dose of feraheme. Spoke with pharmacy related to date of administration it was determined to be correct was advised to proceed with tx as ordered. See nursing notes from wed. Pt hailey medication well vitals taken and were wnl. No s/s of reaction noted during infusion pt d/c in stable condition without any questions or concerns.

## 2017-03-29 DIAGNOSIS — R11.2 NAUSEA AND VOMITING: ICD-10-CM

## 2017-03-29 RX ORDER — ONDANSETRON 8 MG/1
TABLET, ORALLY DISINTEGRATING ORAL
Qty: 90 TABLET | Refills: 0 | OUTPATIENT
Start: 2017-03-29

## 2017-04-07 ENCOUNTER — APPOINTMENT (OUTPATIENT)
Dept: GENERAL RADIOLOGY | Age: 37
DRG: 308 | End: 2017-04-07
Payer: MEDICARE

## 2017-04-07 ENCOUNTER — HOSPITAL ENCOUNTER (INPATIENT)
Age: 37
LOS: 3 days | Discharge: HOME | DRG: 308 | End: 2017-04-10
Attending: EMERGENCY MEDICINE | Admitting: INTERNAL MEDICINE
Payer: MEDICARE

## 2017-04-07 DIAGNOSIS — Z79.4 TYPE 2 DIABETES MELLITUS WITH HYPERGLYCEMIA, WITH LONG-TERM CURRENT USE OF INSULIN (HCC): ICD-10-CM

## 2017-04-07 DIAGNOSIS — N39.0 URINARY TRACT INFECTION, SITE UNSPECIFIED: ICD-10-CM

## 2017-04-07 DIAGNOSIS — D72.825 BANDEMIA: ICD-10-CM

## 2017-04-07 DIAGNOSIS — F10.21 HISTORY OF ALCOHOLISM (HCC): ICD-10-CM

## 2017-04-07 DIAGNOSIS — R79.9 ELEVATED BUN: ICD-10-CM

## 2017-04-07 DIAGNOSIS — F10.10 ALCOHOL ABUSE: ICD-10-CM

## 2017-04-07 DIAGNOSIS — R00.0 WIDE-COMPLEX TACHYCARDIA: ICD-10-CM

## 2017-04-07 DIAGNOSIS — I24.8 DEMAND ISCHEMIA (HCC): ICD-10-CM

## 2017-04-07 DIAGNOSIS — R77.8 ELEVATED TROPONIN: ICD-10-CM

## 2017-04-07 DIAGNOSIS — D72.819 LEUKOCYTOPENIA, UNSPECIFIED: ICD-10-CM

## 2017-04-07 DIAGNOSIS — E11.42 TYPE 2 DIABETES MELLITUS WITH DIABETIC POLYNEUROPATHY (HCC): ICD-10-CM

## 2017-04-07 DIAGNOSIS — E11.10 TYPE 2 DIABETES MELLITUS WITH KETOACIDOSIS WITHOUT COMA, WITH LONG-TERM CURRENT USE OF INSULIN (HCC): ICD-10-CM

## 2017-04-07 DIAGNOSIS — E11.10 DIABETIC KETOACIDOSIS WITHOUT COMA ASSOCIATED WITH TYPE 2 DIABETES MELLITUS (HCC): ICD-10-CM

## 2017-04-07 DIAGNOSIS — R07.9 CHEST PAIN, UNSPECIFIED TYPE: ICD-10-CM

## 2017-04-07 DIAGNOSIS — Z79.4 TYPE 2 DIABETES MELLITUS WITH KETOACIDOSIS WITHOUT COMA, WITH LONG-TERM CURRENT USE OF INSULIN (HCC): ICD-10-CM

## 2017-04-07 DIAGNOSIS — E87.29 HIGH ANION GAP METABOLIC ACIDOSIS: ICD-10-CM

## 2017-04-07 DIAGNOSIS — N28.9 ACUTE RENAL INSUFFICIENCY: ICD-10-CM

## 2017-04-07 DIAGNOSIS — I47.1 SVT (SUPRAVENTRICULAR TACHYCARDIA) (HCC): Primary | ICD-10-CM

## 2017-04-07 DIAGNOSIS — R79.89 ELEVATED SERUM CREATININE: ICD-10-CM

## 2017-04-07 DIAGNOSIS — E11.65 TYPE 2 DIABETES MELLITUS WITH HYPERGLYCEMIA, WITH LONG-TERM CURRENT USE OF INSULIN (HCC): ICD-10-CM

## 2017-04-07 LAB
-: ABNORMAL
ABSOLUTE BANDS #: 1.69 K/UL (ref 0–1)
ABSOLUTE EOS #: 0 K/UL (ref 0–0.4)
ABSOLUTE LYMPH #: 0.34 K/UL (ref 1–4.8)
ABSOLUTE MONO #: 1.69 K/UL (ref 0.1–1.3)
ALLEN TEST: ABNORMAL
AMORPHOUS: ABNORMAL
ANION GAP SERPL CALCULATED.3IONS-SCNC: 35 MMOL/L (ref 9–17)
BACTERIA: ABNORMAL
BANDS: 10 % (ref 0–10)
BASOPHILS # BLD: 0 % (ref 0–2)
BASOPHILS ABSOLUTE: 0 K/UL (ref 0–0.2)
BETA-HYDROXYBUTYRATE: 3.43 MMOL/L (ref 0.02–0.27)
BILIRUBIN URINE: NEGATIVE
BUN BLDV-MCNC: 23 MG/DL (ref 6–20)
BUN/CREAT BLD: ABNORMAL (ref 9–20)
CALCIUM SERPL-MCNC: 8.3 MG/DL (ref 8.6–10.4)
CARBOXYHEMOGLOBIN: 5.7 %
CASTS UA: ABNORMAL /LPF
CHLORIDE BLD-SCNC: 80 MMOL/L (ref 98–107)
CHP ED QC CHECK: NORMAL
CHP ED QC CHECK: NORMAL
CO2: 10 MMOL/L (ref 20–31)
COLOR: YELLOW
COMMENT UA: ABNORMAL
CREAT SERPL-MCNC: 1.23 MG/DL (ref 0.5–0.9)
CRYSTALS, UA: ABNORMAL /HPF
DIFFERENTIAL TYPE: ABNORMAL
EOSINOPHILS RELATIVE PERCENT: 0 % (ref 0–4)
EPITHELIAL CELLS UA: ABNORMAL /HPF
FIO2: ABNORMAL
GFR AFRICAN AMERICAN: 60 ML/MIN
GFR NON-AFRICAN AMERICAN: 49 ML/MIN
GFR SERPL CREATININE-BSD FRML MDRD: ABNORMAL ML/MIN/{1.73_M2}
GFR SERPL CREATININE-BSD FRML MDRD: ABNORMAL ML/MIN/{1.73_M2}
GLUCOSE BLD-MCNC: 400 MG/DL
GLUCOSE BLD-MCNC: 400 MG/DL (ref 65–105)
GLUCOSE BLD-MCNC: 487 MG/DL
GLUCOSE BLD-MCNC: 487 MG/DL (ref 65–105)
GLUCOSE BLD-MCNC: 604 MG/DL (ref 70–99)
GLUCOSE URINE: ABNORMAL
HCO3 VENOUS: 13.5 MMOL/L (ref 24–30)
HCT VFR BLD CALC: 47.1 % (ref 36–46)
HEMOGLOBIN: 14.9 G/DL (ref 12–16)
KETONES, URINE: ABNORMAL
LEUKOCYTE ESTERASE, URINE: NEGATIVE
LYMPHOCYTES # BLD: 2 % (ref 24–44)
MAGNESIUM: 1.9 MG/DL (ref 1.6–2.6)
MCH RBC QN AUTO: 30 PG (ref 26–34)
MCHC RBC AUTO-ENTMCNC: 31.7 G/DL (ref 31–37)
MCV RBC AUTO: 94.5 FL (ref 80–100)
METHEMOGLOBIN: 0.5 %
MODE: ABNORMAL
MONOCYTES # BLD: 10 % (ref 1–7)
MORPHOLOGY: ABNORMAL
MUCUS: ABNORMAL
MYOGLOBIN: 58 NG/ML (ref 25–58)
MYOGLOBIN: 88 NG/ML (ref 25–58)
NEGATIVE BASE EXCESS, VEN: 10.8 MMOL/L (ref 0–2)
NITRITE, URINE: NEGATIVE
NOTIFICATION TIME: ABNORMAL
NOTIFICATION: ABNORMAL
O2 DEVICE/FLOW/%: ABNORMAL
O2 SAT, VEN: 89.6 %
OTHER OBSERVATIONS UA: ABNORMAL
OXYHEMOGLOBIN: ABNORMAL % (ref 95–98)
PATIENT TEMP: 37
PCO2, VEN, TEMP ADJ: ABNORMAL MMHG (ref 39–55)
PCO2, VEN: 20.1 (ref 39–55)
PDW BLD-RTO: 15.1 % (ref 11.5–14.9)
PEEP/CPAP: ABNORMAL
PH UA: 6 (ref 5–8)
PH VENOUS: 7.43 (ref 7.32–7.42)
PH, VEN, TEMP ADJ: ABNORMAL (ref 7.32–7.42)
PHOSPHORUS: 7.4 MG/DL (ref 2.6–4.5)
PLATELET # BLD: 188 K/UL (ref 150–450)
PLATELET ESTIMATE: ABNORMAL
PMV BLD AUTO: 11.5 FL (ref 6–12)
PO2, VEN, TEMP ADJ: ABNORMAL MMHG (ref 30–50)
PO2, VEN: 68.7 (ref 30–50)
POSITIVE BASE EXCESS, VEN: ABNORMAL MMOL/L (ref 0–2)
POTASSIUM SERPL-SCNC: 5.1 MMOL/L (ref 3.7–5.3)
PROTEIN UA: ABNORMAL
PSV: ABNORMAL
PT. POSITION: ABNORMAL
RBC # BLD: 4.98 M/UL (ref 4–5.2)
RBC # BLD: ABNORMAL 10*6/UL
RBC UA: ABNORMAL /HPF
RENAL EPITHELIAL, UA: ABNORMAL /HPF
RESPIRATORY RATE: ABNORMAL
SAMPLE SITE: ABNORMAL
SEG NEUTROPHILS: 78 % (ref 36–66)
SEGMENTED NEUTROPHILS ABSOLUTE COUNT: 13.18 K/UL (ref 1.3–9.1)
SET RATE: ABNORMAL
SODIUM BLD-SCNC: 125 MMOL/L (ref 135–144)
SPECIFIC GRAVITY UA: 1.01 (ref 1–1.03)
TEXT FOR RESPIRATORY: ABNORMAL
TOTAL HB: ABNORMAL G/DL (ref 12–16)
TOTAL RATE: ABNORMAL
TRICHOMONAS: ABNORMAL
TROPONIN INTERP: ABNORMAL
TROPONIN INTERP: ABNORMAL
TROPONIN T: 0.04 NG/ML
TROPONIN T: 0.07 NG/ML
TURBIDITY: ABNORMAL
URINE HGB: ABNORMAL
UROBILINOGEN, URINE: NORMAL
VT: ABNORMAL
WBC # BLD: 16.9 K/UL (ref 3.5–11)
WBC # BLD: ABNORMAL 10*3/UL
WBC UA: ABNORMAL /HPF
YEAST: ABNORMAL

## 2017-04-07 PROCEDURE — 96374 THER/PROPH/DIAG INJ IV PUSH: CPT

## 2017-04-07 PROCEDURE — 2580000003 HC RX 258: Performed by: EMERGENCY MEDICINE

## 2017-04-07 PROCEDURE — 87086 URINE CULTURE/COLONY COUNT: CPT

## 2017-04-07 PROCEDURE — 96375 TX/PRO/DX INJ NEW DRUG ADDON: CPT

## 2017-04-07 PROCEDURE — 2060000000 HC ICU INTERMEDIATE R&B

## 2017-04-07 PROCEDURE — 83735 ASSAY OF MAGNESIUM: CPT

## 2017-04-07 PROCEDURE — 6360000002 HC RX W HCPCS: Performed by: EMERGENCY MEDICINE

## 2017-04-07 PROCEDURE — 82947 ASSAY GLUCOSE BLOOD QUANT: CPT

## 2017-04-07 PROCEDURE — 6370000000 HC RX 637 (ALT 250 FOR IP): Performed by: EMERGENCY MEDICINE

## 2017-04-07 PROCEDURE — 84484 ASSAY OF TROPONIN QUANT: CPT

## 2017-04-07 PROCEDURE — 82010 KETONE BODYS QUAN: CPT

## 2017-04-07 PROCEDURE — 2000000000 HC ICU R&B

## 2017-04-07 PROCEDURE — 99285 EMERGENCY DEPT VISIT HI MDM: CPT

## 2017-04-07 PROCEDURE — 36415 COLL VENOUS BLD VENIPUNCTURE: CPT

## 2017-04-07 PROCEDURE — 80048 BASIC METABOLIC PNL TOTAL CA: CPT

## 2017-04-07 PROCEDURE — 81001 URINALYSIS AUTO W/SCOPE: CPT

## 2017-04-07 PROCEDURE — 93005 ELECTROCARDIOGRAM TRACING: CPT

## 2017-04-07 PROCEDURE — 84100 ASSAY OF PHOSPHORUS: CPT

## 2017-04-07 PROCEDURE — 71010 XR CHEST PORTABLE: CPT

## 2017-04-07 PROCEDURE — 85025 COMPLETE CBC W/AUTO DIFF WBC: CPT

## 2017-04-07 PROCEDURE — 83874 ASSAY OF MYOGLOBIN: CPT

## 2017-04-07 PROCEDURE — 6360000002 HC RX W HCPCS

## 2017-04-07 PROCEDURE — 82800 BLOOD PH: CPT

## 2017-04-07 PROCEDURE — 82805 BLOOD GASES W/O2 SATURATION: CPT

## 2017-04-07 PROCEDURE — 94762 N-INVAS EAR/PLS OXIMTRY CONT: CPT

## 2017-04-07 RX ORDER — SODIUM CHLORIDE 9 MG/ML
INJECTION, SOLUTION INTRAVENOUS CONTINUOUS
Status: DISCONTINUED | OUTPATIENT
Start: 2017-04-08 | End: 2017-04-08

## 2017-04-07 RX ORDER — ACETAMINOPHEN 325 MG/1
650 TABLET ORAL EVERY 4 HOURS PRN
Status: DISCONTINUED | OUTPATIENT
Start: 2017-04-07 | End: 2017-04-10 | Stop reason: HOSPADM

## 2017-04-07 RX ORDER — METOPROLOL TARTRATE 50 MG/1
25 TABLET, FILM COATED ORAL ONCE
Status: COMPLETED | OUTPATIENT
Start: 2017-04-07 | End: 2017-04-07

## 2017-04-07 RX ORDER — NICOTINE POLACRILEX 4 MG
15 LOZENGE BUCCAL PRN
Status: DISCONTINUED | OUTPATIENT
Start: 2017-04-07 | End: 2017-04-10 | Stop reason: HOSPADM

## 2017-04-07 RX ORDER — LORAZEPAM 2 MG/ML
1 INJECTION INTRAMUSCULAR ONCE
Status: COMPLETED | OUTPATIENT
Start: 2017-04-07 | End: 2017-04-07

## 2017-04-07 RX ORDER — ADENOSINE 3 MG/ML
12 INJECTION, SOLUTION INTRAVENOUS ONCE
Status: DISCONTINUED | OUTPATIENT
Start: 2017-04-07 | End: 2017-04-10 | Stop reason: HOSPADM

## 2017-04-07 RX ORDER — DEXTROSE MONOHYDRATE 50 MG/ML
100 INJECTION, SOLUTION INTRAVENOUS PRN
Status: DISCONTINUED | OUTPATIENT
Start: 2017-04-07 | End: 2017-04-08

## 2017-04-07 RX ORDER — SODIUM CHLORIDE 0.9 % (FLUSH) 0.9 %
10 SYRINGE (ML) INJECTION PRN
Status: DISCONTINUED | OUTPATIENT
Start: 2017-04-07 | End: 2017-04-10 | Stop reason: HOSPADM

## 2017-04-07 RX ORDER — 0.9 % SODIUM CHLORIDE 0.9 %
1000 INTRAVENOUS SOLUTION INTRAVENOUS ONCE
Status: COMPLETED | OUTPATIENT
Start: 2017-04-07 | End: 2017-04-07

## 2017-04-07 RX ORDER — MORPHINE SULFATE 4 MG/ML
4 INJECTION, SOLUTION INTRAMUSCULAR; INTRAVENOUS ONCE
Status: COMPLETED | OUTPATIENT
Start: 2017-04-07 | End: 2017-04-07

## 2017-04-07 RX ORDER — DEXTROSE MONOHYDRATE 25 G/50ML
12.5 INJECTION, SOLUTION INTRAVENOUS PRN
Status: DISCONTINUED | OUTPATIENT
Start: 2017-04-07 | End: 2017-04-10 | Stop reason: HOSPADM

## 2017-04-07 RX ORDER — ADENOSINE 3 MG/ML
INJECTION, SOLUTION INTRAVENOUS
Status: COMPLETED
Start: 2017-04-07 | End: 2017-04-07

## 2017-04-07 RX ORDER — ADENOSINE 3 MG/ML
6 INJECTION, SOLUTION INTRAVENOUS ONCE
Status: COMPLETED | OUTPATIENT
Start: 2017-04-07 | End: 2017-04-07

## 2017-04-07 RX ORDER — SODIUM CHLORIDE 0.9 % (FLUSH) 0.9 %
10 SYRINGE (ML) INJECTION EVERY 12 HOURS SCHEDULED
Status: DISCONTINUED | OUTPATIENT
Start: 2017-04-08 | End: 2017-04-10 | Stop reason: HOSPADM

## 2017-04-07 RX ADMIN — ADENOSINE 12 MG: 3 INJECTION, SOLUTION INTRAVENOUS at 20:08

## 2017-04-07 RX ADMIN — LORAZEPAM 1 MG: 2 INJECTION, SOLUTION INTRAMUSCULAR; INTRAVENOUS at 20:18

## 2017-04-07 RX ADMIN — METOPROLOL TARTRATE 25 MG: 50 TABLET ORAL at 21:01

## 2017-04-07 RX ADMIN — ADENOSINE 6 MG: 3 INJECTION, SOLUTION INTRAVENOUS at 19:39

## 2017-04-07 RX ADMIN — MORPHINE SULFATE 4 MG: 4 INJECTION, SOLUTION INTRAMUSCULAR; INTRAVENOUS at 22:50

## 2017-04-07 RX ADMIN — SODIUM CHLORIDE 0.1 UNITS/KG/HR: 9 INJECTION, SOLUTION INTRAVENOUS at 22:11

## 2017-04-07 RX ADMIN — ADENOSINE 12 MG: 3 INJECTION, SOLUTION INTRAVENOUS at 19:43

## 2017-04-07 RX ADMIN — SODIUM CHLORIDE 0.05 UNITS/KG/HR: 9 INJECTION, SOLUTION INTRAVENOUS at 23:22

## 2017-04-07 RX ADMIN — SODIUM CHLORIDE 1000 ML: 9 INJECTION, SOLUTION INTRAVENOUS at 19:49

## 2017-04-07 ASSESSMENT — PAIN DESCRIPTION - LOCATION: LOCATION: CHEST

## 2017-04-07 ASSESSMENT — PAIN DESCRIPTION - PAIN TYPE: TYPE: ACUTE PAIN

## 2017-04-07 ASSESSMENT — PAIN SCALES - GENERAL: PAINLEVEL_OUTOF10: 7

## 2017-04-08 LAB
AMPHETAMINE SCREEN URINE: NEGATIVE
ANION GAP SERPL CALCULATED.3IONS-SCNC: 15 MMOL/L (ref 9–17)
BARBITURATE SCREEN URINE: NEGATIVE
BENZODIAZEPINE SCREEN, URINE: NEGATIVE
BUN BLDV-MCNC: 22 MG/DL (ref 6–20)
BUN/CREAT BLD: ABNORMAL (ref 9–20)
BUPRENORPHINE URINE: NORMAL
CALCIUM SERPL-MCNC: 8.2 MG/DL (ref 8.6–10.4)
CANNABINOID SCREEN URINE: NEGATIVE
CHLORIDE BLD-SCNC: 95 MMOL/L (ref 98–107)
CHOLESTEROL/HDL RATIO: 3.3
CHOLESTEROL: 190 MG/DL
CO2: 24 MMOL/L (ref 20–31)
COCAINE METABOLITE, URINE: NEGATIVE
CREAT SERPL-MCNC: 0.82 MG/DL (ref 0.5–0.9)
CULTURE: NORMAL
CULTURE: NORMAL
EKG ATRIAL RATE: 118 BPM
EKG ATRIAL RATE: 99 BPM
EKG P AXIS: 104 DEGREES
EKG P AXIS: 80 DEGREES
EKG P-R INTERVAL: 148 MS
EKG P-R INTERVAL: 148 MS
EKG Q-T INTERVAL: 324 MS
EKG Q-T INTERVAL: 390 MS
EKG QRS DURATION: 88 MS
EKG QRS DURATION: 98 MS
EKG QTC CALCULATION (BAZETT): 454 MS
EKG QTC CALCULATION (BAZETT): 500 MS
EKG R AXIS: 115 DEGREES
EKG R AXIS: 80 DEGREES
EKG T AXIS: 105 DEGREES
EKG T AXIS: 79 DEGREES
EKG VENTRICULAR RATE: 118 BPM
EKG VENTRICULAR RATE: 99 BPM
GFR AFRICAN AMERICAN: >60 ML/MIN
GFR NON-AFRICAN AMERICAN: >60 ML/MIN
GFR SERPL CREATININE-BSD FRML MDRD: ABNORMAL ML/MIN/{1.73_M2}
GFR SERPL CREATININE-BSD FRML MDRD: ABNORMAL ML/MIN/{1.73_M2}
GLUCOSE BLD-MCNC: 131 MG/DL (ref 65–105)
GLUCOSE BLD-MCNC: 165 MG/DL (ref 65–105)
GLUCOSE BLD-MCNC: 165 MG/DL (ref 65–105)
GLUCOSE BLD-MCNC: 167 MG/DL (ref 65–105)
GLUCOSE BLD-MCNC: 170 MG/DL (ref 65–105)
GLUCOSE BLD-MCNC: 178 MG/DL (ref 65–105)
GLUCOSE BLD-MCNC: 188 MG/DL (ref 65–105)
GLUCOSE BLD-MCNC: 189 MG/DL (ref 65–105)
GLUCOSE BLD-MCNC: 189 MG/DL (ref 65–105)
GLUCOSE BLD-MCNC: 192 MG/DL (ref 65–105)
GLUCOSE BLD-MCNC: 198 MG/DL (ref 65–105)
GLUCOSE BLD-MCNC: 200 MG/DL (ref 70–99)
GLUCOSE BLD-MCNC: 257 MG/DL (ref 65–105)
GLUCOSE BLD-MCNC: 292 MG/DL (ref 65–105)
GLUCOSE BLD-MCNC: 300 MG/DL (ref 65–105)
HDLC SERPL-MCNC: 57 MG/DL
LDL CHOLESTEROL: 73 MG/DL (ref 0–130)
LV EF: 53 %
LVEF MODALITY: NORMAL
Lab: NORMAL
MAGNESIUM: 1.7 MG/DL (ref 1.6–2.6)
MAGNESIUM: 1.8 MG/DL (ref 1.6–2.6)
MDMA URINE: NORMAL
METHADONE SCREEN, URINE: NEGATIVE
METHAMPHETAMINE, URINE: NORMAL
MYOGLOBIN: 60 NG/ML (ref 25–58)
OPIATES, URINE: NEGATIVE
OXYCODONE SCREEN URINE: NEGATIVE
PHENCYCLIDINE, URINE: NEGATIVE
PHOSPHORUS: 2.2 MG/DL (ref 2.6–4.5)
POTASSIUM SERPL-SCNC: 4.6 MMOL/L (ref 3.7–5.3)
PROPOXYPHENE, URINE: NORMAL
SODIUM BLD-SCNC: 134 MMOL/L (ref 135–144)
SPECIMEN DESCRIPTION: NORMAL
SPECIMEN DESCRIPTION: NORMAL
STATUS: NORMAL
TEST INFORMATION: NORMAL
TRICYCLIC ANTIDEPRESSANTS, UR: NORMAL
TRIGL SERPL-MCNC: 298 MG/DL
TROPONIN INTERP: ABNORMAL
TROPONIN T: 0.14 NG/ML
TSH SERPL DL<=0.05 MIU/L-ACNC: 1.88 MIU/L (ref 0.3–5)
VLDLC SERPL CALC-MCNC: ABNORMAL MG/DL (ref 1–30)

## 2017-04-08 PROCEDURE — 80061 LIPID PANEL: CPT

## 2017-04-08 PROCEDURE — 6360000004 HC RX CONTRAST MEDICATION: Performed by: INTERNAL MEDICINE

## 2017-04-08 PROCEDURE — 80048 BASIC METABOLIC PNL TOTAL CA: CPT

## 2017-04-08 PROCEDURE — 2580000003 HC RX 258: Performed by: EMERGENCY MEDICINE

## 2017-04-08 PROCEDURE — 6370000000 HC RX 637 (ALT 250 FOR IP): Performed by: INTERNAL MEDICINE

## 2017-04-08 PROCEDURE — 2580000003 HC RX 258: Performed by: INTERNAL MEDICINE

## 2017-04-08 PROCEDURE — 84484 ASSAY OF TROPONIN QUANT: CPT

## 2017-04-08 PROCEDURE — 93306 TTE W/DOPPLER COMPLETE: CPT

## 2017-04-08 PROCEDURE — 83735 ASSAY OF MAGNESIUM: CPT

## 2017-04-08 PROCEDURE — 82947 ASSAY GLUCOSE BLOOD QUANT: CPT

## 2017-04-08 PROCEDURE — 93308 TTE F-UP OR LMTD: CPT

## 2017-04-08 PROCEDURE — 80307 DRUG TEST PRSMV CHEM ANLYZR: CPT

## 2017-04-08 PROCEDURE — 36415 COLL VENOUS BLD VENIPUNCTURE: CPT

## 2017-04-08 PROCEDURE — 6360000002 HC RX W HCPCS: Performed by: EMERGENCY MEDICINE

## 2017-04-08 PROCEDURE — 6360000002 HC RX W HCPCS: Performed by: INTERNAL MEDICINE

## 2017-04-08 PROCEDURE — 93005 ELECTROCARDIOGRAM TRACING: CPT

## 2017-04-08 PROCEDURE — 2060000000 HC ICU INTERMEDIATE R&B

## 2017-04-08 PROCEDURE — 83874 ASSAY OF MYOGLOBIN: CPT

## 2017-04-08 PROCEDURE — 94762 N-INVAS EAR/PLS OXIMTRY CONT: CPT

## 2017-04-08 PROCEDURE — 84100 ASSAY OF PHOSPHORUS: CPT

## 2017-04-08 PROCEDURE — 84443 ASSAY THYROID STIM HORMONE: CPT

## 2017-04-08 PROCEDURE — 99223 1ST HOSP IP/OBS HIGH 75: CPT | Performed by: INTERNAL MEDICINE

## 2017-04-08 PROCEDURE — 2500000003 HC RX 250 WO HCPCS: Performed by: INTERNAL MEDICINE

## 2017-04-08 RX ORDER — SODIUM CHLORIDE 9 MG/ML
INJECTION, SOLUTION INTRAVENOUS CONTINUOUS
Status: DISCONTINUED | OUTPATIENT
Start: 2017-04-08 | End: 2017-04-08

## 2017-04-08 RX ORDER — DEXTROSE AND SODIUM CHLORIDE 5; .45 G/100ML; G/100ML
INJECTION, SOLUTION INTRAVENOUS CONTINUOUS PRN
Status: DISCONTINUED | OUTPATIENT
Start: 2017-04-08 | End: 2017-04-08

## 2017-04-08 RX ORDER — INSULIN GLARGINE 100 [IU]/ML
20 INJECTION, SOLUTION SUBCUTANEOUS EVERY MORNING
Status: DISCONTINUED | OUTPATIENT
Start: 2017-04-08 | End: 2017-04-08

## 2017-04-08 RX ORDER — DEXTROSE MONOHYDRATE 25 G/50ML
12.5 INJECTION, SOLUTION INTRAVENOUS PRN
Status: DISCONTINUED | OUTPATIENT
Start: 2017-04-08 | End: 2017-04-10 | Stop reason: HOSPADM

## 2017-04-08 RX ORDER — FONDAPARINUX SODIUM 10 MG/.8ML
10 INJECTION SUBCUTANEOUS DAILY
Status: DISCONTINUED | OUTPATIENT
Start: 2017-04-08 | End: 2017-04-10 | Stop reason: HOSPADM

## 2017-04-08 RX ORDER — LISINOPRIL 5 MG/1
2.5 TABLET ORAL DAILY
Status: DISCONTINUED | OUTPATIENT
Start: 2017-04-08 | End: 2017-04-10

## 2017-04-08 RX ORDER — POTASSIUM CHLORIDE 7.45 MG/ML
10 INJECTION INTRAVENOUS PRN
Status: DISCONTINUED | OUTPATIENT
Start: 2017-04-08 | End: 2017-04-09

## 2017-04-08 RX ORDER — ASPIRIN 81 MG/1
81 TABLET ORAL DAILY
Status: DISCONTINUED | OUTPATIENT
Start: 2017-04-08 | End: 2017-04-10 | Stop reason: HOSPADM

## 2017-04-08 RX ORDER — INSULIN GLARGINE 100 [IU]/ML
55 INJECTION, SOLUTION SUBCUTANEOUS EVERY MORNING
Status: DISCONTINUED | OUTPATIENT
Start: 2017-04-08 | End: 2017-04-10 | Stop reason: HOSPADM

## 2017-04-08 RX ORDER — HYDROCODONE BITARTRATE AND ACETAMINOPHEN 5; 325 MG/1; MG/1
1 TABLET ORAL EVERY 4 HOURS PRN
Status: DISCONTINUED | OUTPATIENT
Start: 2017-04-08 | End: 2017-04-10 | Stop reason: HOSPADM

## 2017-04-08 RX ORDER — INSULIN GLARGINE 100 [IU]/ML
20 INJECTION, SOLUTION SUBCUTANEOUS NIGHTLY
Status: DISCONTINUED | OUTPATIENT
Start: 2017-04-08 | End: 2017-04-08

## 2017-04-08 RX ADMIN — HYDROCODONE BITARTRATE AND ACETAMINOPHEN 1 TABLET: 5; 325 TABLET ORAL at 06:48

## 2017-04-08 RX ADMIN — HYDROCODONE BITARTRATE AND ACETAMINOPHEN 1 TABLET: 5; 325 TABLET ORAL at 15:44

## 2017-04-08 RX ADMIN — INSULIN GLARGINE 55 UNITS: 100 INJECTION, SOLUTION SUBCUTANEOUS at 12:15

## 2017-04-08 RX ADMIN — INSULIN LISPRO 1 UNITS: 100 INJECTION, SOLUTION INTRAVENOUS; SUBCUTANEOUS at 20:38

## 2017-04-08 RX ADMIN — HYDROCODONE BITARTRATE AND ACETAMINOPHEN 1 TABLET: 5; 325 TABLET ORAL at 20:45

## 2017-04-08 RX ADMIN — DILTIAZEM HYDROCHLORIDE 30 MG: 30 TABLET, FILM COATED ORAL at 13:03

## 2017-04-08 RX ADMIN — PERFLUTREN 2.2 MG: 6.52 INJECTION, SUSPENSION INTRAVENOUS at 13:01

## 2017-04-08 RX ADMIN — SODIUM PHOSPHATE, MONOBASIC, MONOHYDRATE AND SODIUM PHOSPHATE, DIBASIC, ANHYDROUS 15 MMOL: 276; 142 INJECTION, SOLUTION INTRAVENOUS at 06:49

## 2017-04-08 RX ADMIN — ASPIRIN 81 MG: 81 TABLET, COATED ORAL at 13:03

## 2017-04-08 RX ADMIN — DILTIAZEM HYDROCHLORIDE 30 MG: 30 TABLET, FILM COATED ORAL at 17:58

## 2017-04-08 RX ADMIN — DEXTROSE AND SODIUM CHLORIDE: 5; 450 INJECTION, SOLUTION INTRAVENOUS at 10:16

## 2017-04-08 RX ADMIN — Medication 10 ML: at 20:38

## 2017-04-08 RX ADMIN — LISINOPRIL 2.5 MG: 5 TABLET ORAL at 13:03

## 2017-04-08 RX ADMIN — SODIUM CHLORIDE: 9 INJECTION, SOLUTION INTRAVENOUS at 00:37

## 2017-04-08 RX ADMIN — HYDROCODONE BITARTRATE AND ACETAMINOPHEN 1 TABLET: 5; 325 TABLET ORAL at 02:38

## 2017-04-08 RX ADMIN — DEXTROSE AND SODIUM CHLORIDE: 5; 450 INJECTION, SOLUTION INTRAVENOUS at 03:50

## 2017-04-08 RX ADMIN — FONDAPARINUX SODIUM 10 MG: 10 INJECTION, SOLUTION SUBCUTANEOUS at 01:31

## 2017-04-08 RX ADMIN — HYDROCODONE BITARTRATE AND ACETAMINOPHEN 1 TABLET: 5; 325 TABLET ORAL at 11:18

## 2017-04-08 RX ADMIN — CEFTRIAXONE SODIUM 1 G: 1 INJECTION, POWDER, FOR SOLUTION INTRAMUSCULAR; INTRAVENOUS at 01:32

## 2017-04-08 RX ADMIN — FONDAPARINUX SODIUM 10 MG: 10 INJECTION, SOLUTION SUBCUTANEOUS at 20:38

## 2017-04-08 RX ADMIN — DILTIAZEM HYDROCHLORIDE 30 MG: 30 TABLET, FILM COATED ORAL at 23:35

## 2017-04-08 ASSESSMENT — PAIN SCALES - GENERAL
PAINLEVEL_OUTOF10: 7
PAINLEVEL_OUTOF10: 0
PAINLEVEL_OUTOF10: 6
PAINLEVEL_OUTOF10: 2
PAINLEVEL_OUTOF10: 6
PAINLEVEL_OUTOF10: 0
PAINLEVEL_OUTOF10: 7
PAINLEVEL_OUTOF10: 3
PAINLEVEL_OUTOF10: 2

## 2017-04-08 ASSESSMENT — PAIN DESCRIPTION - FREQUENCY: FREQUENCY: CONTINUOUS

## 2017-04-08 ASSESSMENT — ENCOUNTER SYMPTOMS
VOMITING: 1
ABDOMINAL PAIN: 0
DIARRHEA: 0
SHORTNESS OF BREATH: 1
SORE THROAT: 0
CONSTIPATION: 0
CHEST TIGHTNESS: 0
NAUSEA: 1

## 2017-04-08 ASSESSMENT — PAIN DESCRIPTION - DESCRIPTORS: DESCRIPTORS: PRESSURE

## 2017-04-08 ASSESSMENT — PAIN DESCRIPTION - LOCATION
LOCATION: HEAD;NECK
LOCATION: HEAD;NECK

## 2017-04-08 ASSESSMENT — PAIN DESCRIPTION - ONSET: ONSET: ON-GOING

## 2017-04-09 LAB
ABSOLUTE EOS #: 0.1 K/UL (ref 0–0.4)
ABSOLUTE LYMPH #: 0.9 K/UL (ref 1–4.8)
ABSOLUTE MONO #: 0.1 K/UL (ref 0.1–1.3)
ANION GAP SERPL CALCULATED.3IONS-SCNC: 12 MMOL/L (ref 9–17)
BASOPHILS # BLD: 1 % (ref 0–2)
BASOPHILS ABSOLUTE: 0.1 K/UL (ref 0–0.2)
BUN BLDV-MCNC: 9 MG/DL (ref 6–20)
BUN/CREAT BLD: ABNORMAL (ref 9–20)
CALCIUM SERPL-MCNC: 8.2 MG/DL (ref 8.6–10.4)
CHLORIDE BLD-SCNC: 100 MMOL/L (ref 98–107)
CO2: 25 MMOL/L (ref 20–31)
CREAT SERPL-MCNC: 0.56 MG/DL (ref 0.5–0.9)
DIFFERENTIAL TYPE: ABNORMAL
EOSINOPHILS RELATIVE PERCENT: 2 % (ref 0–4)
GFR AFRICAN AMERICAN: >60 ML/MIN
GFR NON-AFRICAN AMERICAN: >60 ML/MIN
GFR SERPL CREATININE-BSD FRML MDRD: ABNORMAL ML/MIN/{1.73_M2}
GFR SERPL CREATININE-BSD FRML MDRD: ABNORMAL ML/MIN/{1.73_M2}
GLUCOSE BLD-MCNC: 166 MG/DL (ref 65–105)
GLUCOSE BLD-MCNC: 176 MG/DL (ref 70–99)
GLUCOSE BLD-MCNC: 182 MG/DL (ref 65–105)
GLUCOSE BLD-MCNC: 197 MG/DL (ref 65–105)
HCT VFR BLD CALC: 40.4 % (ref 36–46)
HEMOGLOBIN: 13.4 G/DL (ref 12–16)
LYMPHOCYTES # BLD: 19 % (ref 24–44)
MAGNESIUM: 1.7 MG/DL (ref 1.6–2.6)
MAGNESIUM: 1.8 MG/DL (ref 1.6–2.6)
MCH RBC QN AUTO: 30.4 PG (ref 26–34)
MCHC RBC AUTO-ENTMCNC: 33.2 G/DL (ref 31–37)
MCV RBC AUTO: 91.4 FL (ref 80–100)
MONOCYTES # BLD: 2 % (ref 1–7)
PDW BLD-RTO: 14.2 % (ref 11.5–14.9)
PLATELET # BLD: 114 K/UL (ref 150–450)
PLATELET ESTIMATE: ABNORMAL
PMV BLD AUTO: 11.5 FL (ref 6–12)
POTASSIUM SERPL-SCNC: 3.5 MMOL/L (ref 3.7–5.3)
RBC # BLD: 4.43 M/UL (ref 4–5.2)
RBC # BLD: ABNORMAL 10*6/UL
SEG NEUTROPHILS: 76 % (ref 36–66)
SEGMENTED NEUTROPHILS ABSOLUTE COUNT: 3.9 K/UL (ref 1.3–9.1)
SODIUM BLD-SCNC: 137 MMOL/L (ref 135–144)
WBC # BLD: 5.1 K/UL (ref 3.5–11)
WBC # BLD: ABNORMAL 10*3/UL

## 2017-04-09 PROCEDURE — 82947 ASSAY GLUCOSE BLOOD QUANT: CPT

## 2017-04-09 PROCEDURE — 6370000000 HC RX 637 (ALT 250 FOR IP): Performed by: INTERNAL MEDICINE

## 2017-04-09 PROCEDURE — 83735 ASSAY OF MAGNESIUM: CPT

## 2017-04-09 PROCEDURE — 94762 N-INVAS EAR/PLS OXIMTRY CONT: CPT

## 2017-04-09 PROCEDURE — 6360000002 HC RX W HCPCS: Performed by: INTERNAL MEDICINE

## 2017-04-09 PROCEDURE — 80048 BASIC METABOLIC PNL TOTAL CA: CPT

## 2017-04-09 PROCEDURE — 6370000000 HC RX 637 (ALT 250 FOR IP)

## 2017-04-09 PROCEDURE — 2580000003 HC RX 258: Performed by: INTERNAL MEDICINE

## 2017-04-09 PROCEDURE — 99232 SBSQ HOSP IP/OBS MODERATE 35: CPT | Performed by: INTERNAL MEDICINE

## 2017-04-09 PROCEDURE — 2060000000 HC ICU INTERMEDIATE R&B

## 2017-04-09 PROCEDURE — 36415 COLL VENOUS BLD VENIPUNCTURE: CPT

## 2017-04-09 PROCEDURE — 85025 COMPLETE CBC W/AUTO DIFF WBC: CPT

## 2017-04-09 RX ORDER — DULOXETIN HYDROCHLORIDE 30 MG/1
60 CAPSULE, DELAYED RELEASE ORAL DAILY
Status: DISCONTINUED | OUTPATIENT
Start: 2017-04-09 | End: 2017-04-10 | Stop reason: HOSPADM

## 2017-04-09 RX ORDER — HYDROCODONE BITARTRATE AND ACETAMINOPHEN 5; 325 MG/1; MG/1
TABLET ORAL
Status: COMPLETED
Start: 2017-04-09 | End: 2017-04-09

## 2017-04-09 RX ORDER — DILTIAZEM HYDROCHLORIDE 60 MG/1
60 TABLET, FILM COATED ORAL EVERY 6 HOURS SCHEDULED
Status: DISCONTINUED | OUTPATIENT
Start: 2017-04-09 | End: 2017-04-10

## 2017-04-09 RX ORDER — POTASSIUM CHLORIDE 20 MEQ/1
60 TABLET, EXTENDED RELEASE ORAL ONCE
Status: COMPLETED | OUTPATIENT
Start: 2017-04-09 | End: 2017-04-09

## 2017-04-09 RX ADMIN — HYDROCODONE BITARTRATE AND ACETAMINOPHEN 1 TABLET: 5; 325 TABLET ORAL at 04:54

## 2017-04-09 RX ADMIN — POTASSIUM CHLORIDE 60 MEQ: 1500 TABLET, EXTENDED RELEASE ORAL at 09:14

## 2017-04-09 RX ADMIN — Medication 400 MG: at 09:14

## 2017-04-09 RX ADMIN — HYDROCODONE BITARTRATE AND ACETAMINOPHEN 1 TABLET: 5; 325 TABLET ORAL at 01:28

## 2017-04-09 RX ADMIN — DILTIAZEM HYDROCHLORIDE 60 MG: 60 TABLET, FILM COATED ORAL at 11:33

## 2017-04-09 RX ADMIN — Medication 10 ML: at 08:37

## 2017-04-09 RX ADMIN — DILTIAZEM HYDROCHLORIDE 60 MG: 60 TABLET, FILM COATED ORAL at 23:22

## 2017-04-09 RX ADMIN — DILTIAZEM HYDROCHLORIDE 30 MG: 30 TABLET, FILM COATED ORAL at 05:45

## 2017-04-09 RX ADMIN — Medication 400 MG: at 20:23

## 2017-04-09 RX ADMIN — INSULIN LISPRO 2 UNITS: 100 INJECTION, SOLUTION INTRAVENOUS; SUBCUTANEOUS at 12:34

## 2017-04-09 RX ADMIN — DILTIAZEM HYDROCHLORIDE 60 MG: 60 TABLET, FILM COATED ORAL at 17:02

## 2017-04-09 RX ADMIN — INSULIN GLARGINE 55 UNITS: 100 INJECTION, SOLUTION SUBCUTANEOUS at 08:40

## 2017-04-09 RX ADMIN — Medication 10 ML: at 21:00

## 2017-04-09 RX ADMIN — ASPIRIN 81 MG: 81 TABLET, COATED ORAL at 08:15

## 2017-04-09 RX ADMIN — INSULIN LISPRO 2 UNITS: 100 INJECTION, SOLUTION INTRAVENOUS; SUBCUTANEOUS at 08:18

## 2017-04-09 RX ADMIN — LISINOPRIL 2.5 MG: 5 TABLET ORAL at 08:15

## 2017-04-09 RX ADMIN — INSULIN LISPRO 1 UNITS: 100 INJECTION, SOLUTION INTRAVENOUS; SUBCUTANEOUS at 20:23

## 2017-04-09 RX ADMIN — FONDAPARINUX SODIUM 10 MG: 10 INJECTION, SOLUTION SUBCUTANEOUS at 20:23

## 2017-04-09 RX ADMIN — HYDROCODONE BITARTRATE AND ACETAMINOPHEN 1 TABLET: 5; 325 TABLET ORAL at 09:05

## 2017-04-09 RX ADMIN — HYDROCODONE BITARTRATE AND ACETAMINOPHEN 1 TABLET: 5; 325 TABLET ORAL at 23:08

## 2017-04-09 RX ADMIN — DULOXETINE HYDROCHLORIDE 60 MG: 30 CAPSULE, DELAYED RELEASE ORAL at 15:48

## 2017-04-09 RX ADMIN — INSULIN LISPRO 2 UNITS: 100 INJECTION, SOLUTION INTRAVENOUS; SUBCUTANEOUS at 17:04

## 2017-04-09 ASSESSMENT — PAIN SCALES - GENERAL
PAINLEVEL_OUTOF10: 1
PAINLEVEL_OUTOF10: 0
PAINLEVEL_OUTOF10: 5
PAINLEVEL_OUTOF10: 5
PAINLEVEL_OUTOF10: 3
PAINLEVEL_OUTOF10: 3
PAINLEVEL_OUTOF10: 0
PAINLEVEL_OUTOF10: 2
PAINLEVEL_OUTOF10: 3
PAINLEVEL_OUTOF10: 6
PAINLEVEL_OUTOF10: 3
PAINLEVEL_OUTOF10: 2

## 2017-04-10 VITALS
HEIGHT: 68 IN | BODY MASS INDEX: 44.41 KG/M2 | RESPIRATION RATE: 13 BRPM | OXYGEN SATURATION: 96 % | DIASTOLIC BLOOD PRESSURE: 81 MMHG | WEIGHT: 293 LBS | HEART RATE: 83 BPM | TEMPERATURE: 98.3 F | SYSTOLIC BLOOD PRESSURE: 143 MMHG

## 2017-04-10 LAB
ANION GAP SERPL CALCULATED.3IONS-SCNC: 14 MMOL/L (ref 9–17)
BUN BLDV-MCNC: 7 MG/DL (ref 6–20)
BUN/CREAT BLD: ABNORMAL (ref 9–20)
CALCIUM SERPL-MCNC: 8.7 MG/DL (ref 8.6–10.4)
CHLORIDE BLD-SCNC: 93 MMOL/L (ref 98–107)
CO2: 26 MMOL/L (ref 20–31)
CREAT SERPL-MCNC: 0.45 MG/DL (ref 0.5–0.9)
GFR AFRICAN AMERICAN: >60 ML/MIN
GFR NON-AFRICAN AMERICAN: >60 ML/MIN
GFR SERPL CREATININE-BSD FRML MDRD: ABNORMAL ML/MIN/{1.73_M2}
GFR SERPL CREATININE-BSD FRML MDRD: ABNORMAL ML/MIN/{1.73_M2}
GLUCOSE BLD-MCNC: 141 MG/DL (ref 65–105)
GLUCOSE BLD-MCNC: 162 MG/DL (ref 65–105)
GLUCOSE BLD-MCNC: 261 MG/DL (ref 70–99)
POTASSIUM SERPL-SCNC: 3.9 MMOL/L (ref 3.7–5.3)
SODIUM BLD-SCNC: 133 MMOL/L (ref 135–144)

## 2017-04-10 PROCEDURE — 82947 ASSAY GLUCOSE BLOOD QUANT: CPT

## 2017-04-10 PROCEDURE — 94762 N-INVAS EAR/PLS OXIMTRY CONT: CPT

## 2017-04-10 PROCEDURE — 6370000000 HC RX 637 (ALT 250 FOR IP): Performed by: INTERNAL MEDICINE

## 2017-04-10 PROCEDURE — 36415 COLL VENOUS BLD VENIPUNCTURE: CPT

## 2017-04-10 PROCEDURE — 80048 BASIC METABOLIC PNL TOTAL CA: CPT

## 2017-04-10 PROCEDURE — 2580000003 HC RX 258: Performed by: INTERNAL MEDICINE

## 2017-04-10 PROCEDURE — 99239 HOSP IP/OBS DSCHRG MGMT >30: CPT | Performed by: INTERNAL MEDICINE

## 2017-04-10 RX ORDER — DILTIAZEM HYDROCHLORIDE 240 MG/1
240 CAPSULE, COATED, EXTENDED RELEASE ORAL DAILY
Status: DISCONTINUED | OUTPATIENT
Start: 2017-04-10 | End: 2017-04-10 | Stop reason: HOSPADM

## 2017-04-10 RX ORDER — LISINOPRIL 5 MG/1
5 TABLET ORAL
Qty: 30 TABLET | Refills: 3 | Status: SHIPPED | OUTPATIENT
Start: 2017-04-11 | End: 2019-12-05

## 2017-04-10 RX ORDER — DILTIAZEM HYDROCHLORIDE 240 MG/1
240 CAPSULE, COATED, EXTENDED RELEASE ORAL DAILY
Qty: 30 CAPSULE | Refills: 3 | Status: SHIPPED | OUTPATIENT
Start: 2017-04-10 | End: 2020-06-30 | Stop reason: CLARIF

## 2017-04-10 RX ORDER — INSULIN GLARGINE 100 [IU]/ML
30 INJECTION, SOLUTION SUBCUTANEOUS NIGHTLY
Qty: 4.5 ML | Refills: 3 | Status: ON HOLD | OUTPATIENT
Start: 2017-04-10 | End: 2018-11-17 | Stop reason: ALTCHOICE

## 2017-04-10 RX ORDER — DULOXETIN HYDROCHLORIDE 60 MG/1
60 CAPSULE, DELAYED RELEASE ORAL DAILY
Qty: 30 CAPSULE | Refills: 3 | Status: SHIPPED | OUTPATIENT
Start: 2017-04-10 | End: 2019-02-22

## 2017-04-10 RX ORDER — LISINOPRIL 5 MG/1
5 TABLET ORAL
Status: DISCONTINUED | OUTPATIENT
Start: 2017-04-11 | End: 2017-04-10 | Stop reason: HOSPADM

## 2017-04-10 RX ADMIN — INSULIN GLARGINE 55 UNITS: 100 INJECTION, SOLUTION SUBCUTANEOUS at 08:51

## 2017-04-10 RX ADMIN — DILTIAZEM HYDROCHLORIDE 60 MG: 60 TABLET, FILM COATED ORAL at 05:31

## 2017-04-10 RX ADMIN — INSULIN LISPRO 2 UNITS: 100 INJECTION, SOLUTION INTRAVENOUS; SUBCUTANEOUS at 08:13

## 2017-04-10 RX ADMIN — DILTIAZEM HYDROCHLORIDE 240 MG: 240 CAPSULE, EXTENDED RELEASE ORAL at 10:44

## 2017-04-10 RX ADMIN — INSULIN LISPRO 2 UNITS: 100 INJECTION, SOLUTION INTRAVENOUS; SUBCUTANEOUS at 12:10

## 2017-04-10 RX ADMIN — LISINOPRIL 2.5 MG: 5 TABLET ORAL at 08:13

## 2017-04-10 RX ADMIN — Medication 10 ML: at 08:13

## 2017-04-10 RX ADMIN — DULOXETINE HYDROCHLORIDE 60 MG: 30 CAPSULE, DELAYED RELEASE ORAL at 08:13

## 2017-04-10 RX ADMIN — Medication 400 MG: at 08:13

## 2017-04-10 RX ADMIN — HYDROCODONE BITARTRATE AND ACETAMINOPHEN 1 TABLET: 5; 325 TABLET ORAL at 05:30

## 2017-04-10 RX ADMIN — ASPIRIN 81 MG: 81 TABLET, COATED ORAL at 08:13

## 2017-04-10 ASSESSMENT — PAIN SCALES - GENERAL
PAINLEVEL_OUTOF10: 3
PAINLEVEL_OUTOF10: 0
PAINLEVEL_OUTOF10: 0
PAINLEVEL_OUTOF10: 5

## 2017-04-13 ENCOUNTER — APPOINTMENT (OUTPATIENT)
Dept: CT IMAGING | Age: 37
End: 2017-04-13
Payer: MEDICARE

## 2017-04-13 ENCOUNTER — APPOINTMENT (OUTPATIENT)
Dept: NUCLEAR MEDICINE | Age: 37
End: 2017-04-13
Payer: MEDICARE

## 2017-04-13 ENCOUNTER — APPOINTMENT (OUTPATIENT)
Dept: GENERAL RADIOLOGY | Age: 37
End: 2017-04-13
Payer: MEDICARE

## 2017-04-13 ENCOUNTER — HOSPITAL ENCOUNTER (EMERGENCY)
Age: 37
Discharge: HOME | End: 2017-04-13
Attending: EMERGENCY MEDICINE
Payer: MEDICARE

## 2017-04-13 VITALS
HEIGHT: 68 IN | OXYGEN SATURATION: 97 % | HEART RATE: 84 BPM | BODY MASS INDEX: 44.41 KG/M2 | RESPIRATION RATE: 16 BRPM | TEMPERATURE: 98.8 F | WEIGHT: 293 LBS | DIASTOLIC BLOOD PRESSURE: 77 MMHG | SYSTOLIC BLOOD PRESSURE: 138 MMHG

## 2017-04-13 DIAGNOSIS — R07.9 CHEST PAIN, UNSPECIFIED TYPE: Primary | ICD-10-CM

## 2017-04-13 LAB
ABSOLUTE BANDS #: 0.03 K/UL (ref 0–1)
ABSOLUTE EOS #: 0 K/UL (ref 0–0.4)
ABSOLUTE LYMPH #: 0.53 K/UL (ref 1–4.8)
ABSOLUTE MONO #: 0.33 K/UL (ref 0.1–1.3)
ANION GAP SERPL CALCULATED.3IONS-SCNC: 13 MMOL/L (ref 9–17)
BANDS: 1 % (ref 0–10)
BASOPHILS # BLD: 0 % (ref 0–2)
BASOPHILS ABSOLUTE: 0 K/UL (ref 0–0.2)
BUN BLDV-MCNC: 8 MG/DL (ref 6–20)
BUN/CREAT BLD: ABNORMAL (ref 9–20)
CALCIUM SERPL-MCNC: 8.7 MG/DL (ref 8.6–10.4)
CHLORIDE BLD-SCNC: 97 MMOL/L (ref 98–107)
CO2: 23 MMOL/L (ref 20–31)
CREAT SERPL-MCNC: 0.52 MG/DL (ref 0.5–0.9)
DIFFERENTIAL TYPE: ABNORMAL
EOSINOPHILS RELATIVE PERCENT: 0 % (ref 0–4)
GFR AFRICAN AMERICAN: >60 ML/MIN
GFR NON-AFRICAN AMERICAN: >60 ML/MIN
GFR SERPL CREATININE-BSD FRML MDRD: ABNORMAL ML/MIN/{1.73_M2}
GFR SERPL CREATININE-BSD FRML MDRD: ABNORMAL ML/MIN/{1.73_M2}
GLUCOSE BLD-MCNC: 340 MG/DL (ref 70–99)
HCT VFR BLD CALC: 43.4 % (ref 36–46)
HEMOGLOBIN: 13.8 G/DL (ref 12–16)
LYMPHOCYTES # BLD: 16 % (ref 24–44)
MCH RBC QN AUTO: 29.5 PG (ref 26–34)
MCHC RBC AUTO-ENTMCNC: 31.8 G/DL (ref 31–37)
MCV RBC AUTO: 92.8 FL (ref 80–100)
MONOCYTES # BLD: 10 % (ref 1–7)
MORPHOLOGY: NORMAL
MYOGLOBIN: <21 NG/ML (ref 25–58)
PDW BLD-RTO: 14 % (ref 11.5–14.9)
PLATELET # BLD: 128 K/UL (ref 150–450)
PLATELET ESTIMATE: ABNORMAL
PMV BLD AUTO: 10.7 FL (ref 6–12)
POTASSIUM SERPL-SCNC: 4.6 MMOL/L (ref 3.7–5.3)
RBC # BLD: 4.68 M/UL (ref 4–5.2)
RBC # BLD: ABNORMAL 10*6/UL
SEG NEUTROPHILS: 73 % (ref 36–66)
SEGMENTED NEUTROPHILS ABSOLUTE COUNT: 2.41 K/UL (ref 1.3–9.1)
SODIUM BLD-SCNC: 133 MMOL/L (ref 135–144)
TROPONIN INTERP: ABNORMAL
TROPONIN T: <0.03 NG/ML
WBC # BLD: 3.3 K/UL (ref 3.5–11)
WBC # BLD: ABNORMAL 10*3/UL

## 2017-04-13 PROCEDURE — A9540 TC99M MAA: HCPCS | Performed by: EMERGENCY MEDICINE

## 2017-04-13 PROCEDURE — 36415 COLL VENOUS BLD VENIPUNCTURE: CPT

## 2017-04-13 PROCEDURE — 80048 BASIC METABOLIC PNL TOTAL CA: CPT

## 2017-04-13 PROCEDURE — 78582 LUNG VENTILAT&PERFUS IMAGING: CPT

## 2017-04-13 PROCEDURE — 6370000000 HC RX 637 (ALT 250 FOR IP): Performed by: EMERGENCY MEDICINE

## 2017-04-13 PROCEDURE — 99285 EMERGENCY DEPT VISIT HI MDM: CPT

## 2017-04-13 PROCEDURE — 85025 COMPLETE CBC W/AUTO DIFF WBC: CPT

## 2017-04-13 PROCEDURE — 6360000002 HC RX W HCPCS: Performed by: EMERGENCY MEDICINE

## 2017-04-13 PROCEDURE — 71020 XR CHEST STANDARD TWO VW: CPT

## 2017-04-13 PROCEDURE — 96374 THER/PROPH/DIAG INJ IV PUSH: CPT

## 2017-04-13 PROCEDURE — 83874 ASSAY OF MYOGLOBIN: CPT

## 2017-04-13 PROCEDURE — 3430000000 HC RX DIAGNOSTIC RADIOPHARMACEUTICAL: Performed by: EMERGENCY MEDICINE

## 2017-04-13 PROCEDURE — 84484 ASSAY OF TROPONIN QUANT: CPT

## 2017-04-13 PROCEDURE — 93005 ELECTROCARDIOGRAM TRACING: CPT

## 2017-04-13 PROCEDURE — A9539 TC99M PENTETATE: HCPCS | Performed by: EMERGENCY MEDICINE

## 2017-04-13 RX ORDER — ASPIRIN 81 MG/1
324 TABLET, CHEWABLE ORAL ONCE
Status: COMPLETED | OUTPATIENT
Start: 2017-04-13 | End: 2017-04-13

## 2017-04-13 RX ORDER — 0.9 % SODIUM CHLORIDE 0.9 %
100 INTRAVENOUS SOLUTION INTRAVENOUS ONCE
Status: DISCONTINUED | OUTPATIENT
Start: 2017-04-13 | End: 2017-04-13 | Stop reason: HOSPADM

## 2017-04-13 RX ORDER — SODIUM CHLORIDE 0.9 % (FLUSH) 0.9 %
10 SYRINGE (ML) INJECTION PRN
Status: DISCONTINUED | OUTPATIENT
Start: 2017-04-13 | End: 2017-04-13 | Stop reason: HOSPADM

## 2017-04-13 RX ORDER — FENTANYL CITRATE 50 UG/ML
100 INJECTION, SOLUTION INTRAMUSCULAR; INTRAVENOUS ONCE
Status: COMPLETED | OUTPATIENT
Start: 2017-04-13 | End: 2017-04-13

## 2017-04-13 RX ORDER — KIT FOR THE PREPARATION OF TECHNETIUM TC 99M PENTETATE 20 MG/1
40 INJECTION, POWDER, LYOPHILIZED, FOR SOLUTION INTRAVENOUS; RESPIRATORY (INHALATION)
Status: COMPLETED | OUTPATIENT
Start: 2017-04-13 | End: 2017-04-13

## 2017-04-13 RX ADMIN — FENTANYL CITRATE 100 MCG: 50 INJECTION INTRAMUSCULAR; INTRAVENOUS at 13:52

## 2017-04-13 RX ADMIN — KIT FOR THE PREPARATION OF TECHNETIUM TC 99M PENTETATE 45 MILLICURIE: 20 INJECTION, POWDER, LYOPHILIZED, FOR SOLUTION INTRAVENOUS; RESPIRATORY (INHALATION) at 14:08

## 2017-04-13 RX ADMIN — Medication 8 MILLICURIE: at 14:25

## 2017-04-13 RX ADMIN — ASPIRIN 324 MG: 81 TABLET, CHEWABLE ORAL at 13:51

## 2017-04-13 ASSESSMENT — ENCOUNTER SYMPTOMS
NAUSEA: 0
RHINORRHEA: 0
EYE REDNESS: 0
COUGH: 0
BACK PAIN: 0
SHORTNESS OF BREATH: 1
ABDOMINAL PAIN: 0
VOMITING: 0
EYE PAIN: 0

## 2017-04-13 ASSESSMENT — PAIN DESCRIPTION - DESCRIPTORS
DESCRIPTORS: PRESSURE
DESCRIPTORS: ACHING;PRESSURE

## 2017-04-13 ASSESSMENT — PAIN SCALES - GENERAL
PAINLEVEL_OUTOF10: 7
PAINLEVEL_OUTOF10: 8
PAINLEVEL_OUTOF10: 7

## 2017-04-13 ASSESSMENT — PAIN DESCRIPTION - FREQUENCY
FREQUENCY: INTERMITTENT
FREQUENCY: CONTINUOUS

## 2017-04-13 ASSESSMENT — PAIN DESCRIPTION - LOCATION: LOCATION: CHEST

## 2017-04-14 LAB
EKG ATRIAL RATE: 104 BPM
EKG P AXIS: 61 DEGREES
EKG P-R INTERVAL: 132 MS
EKG Q-T INTERVAL: 348 MS
EKG QRS DURATION: 86 MS
EKG QTC CALCULATION (BAZETT): 457 MS
EKG R AXIS: 56 DEGREES
EKG T AXIS: 67 DEGREES
EKG VENTRICULAR RATE: 104 BPM

## 2017-04-22 ENCOUNTER — APPOINTMENT (OUTPATIENT)
Dept: CT IMAGING | Age: 37
End: 2017-04-22
Payer: MEDICARE

## 2017-04-22 ENCOUNTER — APPOINTMENT (OUTPATIENT)
Dept: GENERAL RADIOLOGY | Age: 37
End: 2017-04-22
Payer: MEDICARE

## 2017-04-22 ENCOUNTER — HOSPITAL ENCOUNTER (EMERGENCY)
Age: 37
Discharge: HOME | End: 2017-04-23
Attending: EMERGENCY MEDICINE
Payer: MEDICARE

## 2017-04-22 VITALS
RESPIRATION RATE: 21 BRPM | DIASTOLIC BLOOD PRESSURE: 63 MMHG | WEIGHT: 293 LBS | BODY MASS INDEX: 44.41 KG/M2 | OXYGEN SATURATION: 97 % | HEIGHT: 68 IN | TEMPERATURE: 97.8 F | HEART RATE: 84 BPM | SYSTOLIC BLOOD PRESSURE: 124 MMHG

## 2017-04-22 DIAGNOSIS — R07.9 CHEST PAIN, UNSPECIFIED TYPE: Primary | ICD-10-CM

## 2017-04-22 LAB
ABSOLUTE EOS #: 0.1 K/UL (ref 0–0.4)
ABSOLUTE LYMPH #: 2 K/UL (ref 1–4.8)
ABSOLUTE MONO #: 0.5 K/UL (ref 0.1–1.2)
ANION GAP SERPL CALCULATED.3IONS-SCNC: 17 MMOL/L (ref 9–17)
BASOPHILS # BLD: 1 % (ref 0–2)
BASOPHILS ABSOLUTE: 0.1 K/UL (ref 0–0.2)
BUN BLDV-MCNC: 15 MG/DL (ref 6–20)
BUN/CREAT BLD: ABNORMAL (ref 9–20)
CALCIUM SERPL-MCNC: 8.3 MG/DL (ref 8.6–10.4)
CHLORIDE BLD-SCNC: 103 MMOL/L (ref 98–107)
CO2: 17 MMOL/L (ref 20–31)
CREAT SERPL-MCNC: 0.63 MG/DL (ref 0.5–0.9)
DIFFERENTIAL TYPE: NORMAL
EOSINOPHILS RELATIVE PERCENT: 1 % (ref 1–4)
GFR AFRICAN AMERICAN: >60 ML/MIN
GFR NON-AFRICAN AMERICAN: >60 ML/MIN
GFR SERPL CREATININE-BSD FRML MDRD: ABNORMAL ML/MIN/{1.73_M2}
GFR SERPL CREATININE-BSD FRML MDRD: ABNORMAL ML/MIN/{1.73_M2}
GLUCOSE BLD-MCNC: 246 MG/DL (ref 70–99)
HCT VFR BLD CALC: 36.7 % (ref 36–46)
HEMOGLOBIN: 12.3 G/DL (ref 12–16)
LYMPHOCYTES # BLD: 32 % (ref 24–44)
MCH RBC QN AUTO: 30.2 PG (ref 26–34)
MCHC RBC AUTO-ENTMCNC: 33.4 G/DL (ref 31–37)
MCV RBC AUTO: 90.4 FL (ref 80–100)
MONOCYTES # BLD: 7 % (ref 2–11)
PDW BLD-RTO: 14.8 % (ref 12.5–15.4)
PLATELET # BLD: 321 K/UL (ref 140–450)
PLATELET ESTIMATE: NORMAL
PMV BLD AUTO: 9.8 FL (ref 6–12)
POC TROPONIN I: 0 NG/ML (ref 0–0.1)
POC TROPONIN INTERP: NORMAL
POTASSIUM SERPL-SCNC: 4 MMOL/L (ref 3.7–5.3)
RBC # BLD: 4.06 M/UL (ref 4–5.2)
RBC # BLD: NORMAL 10*6/UL
SEG NEUTROPHILS: 59 % (ref 36–66)
SEGMENTED NEUTROPHILS ABSOLUTE COUNT: 3.7 K/UL (ref 1.8–7.7)
SODIUM BLD-SCNC: 137 MMOL/L (ref 135–144)
WBC # BLD: 6.3 K/UL (ref 3.5–11)
WBC # BLD: NORMAL 10*3/UL

## 2017-04-22 PROCEDURE — 6360000002 HC RX W HCPCS: Performed by: EMERGENCY MEDICINE

## 2017-04-22 PROCEDURE — 6360000004 HC RX CONTRAST MEDICATION: Performed by: EMERGENCY MEDICINE

## 2017-04-22 PROCEDURE — 71020 XR CHEST STANDARD TWO VW: CPT

## 2017-04-22 PROCEDURE — 85025 COMPLETE CBC W/AUTO DIFF WBC: CPT

## 2017-04-22 PROCEDURE — 96374 THER/PROPH/DIAG INJ IV PUSH: CPT

## 2017-04-22 PROCEDURE — 6370000000 HC RX 637 (ALT 250 FOR IP): Performed by: EMERGENCY MEDICINE

## 2017-04-22 PROCEDURE — 93005 ELECTROCARDIOGRAM TRACING: CPT

## 2017-04-22 PROCEDURE — 96375 TX/PRO/DX INJ NEW DRUG ADDON: CPT

## 2017-04-22 PROCEDURE — 71260 CT THORAX DX C+: CPT

## 2017-04-22 PROCEDURE — 96376 TX/PRO/DX INJ SAME DRUG ADON: CPT

## 2017-04-22 PROCEDURE — 99285 EMERGENCY DEPT VISIT HI MDM: CPT

## 2017-04-22 PROCEDURE — 84484 ASSAY OF TROPONIN QUANT: CPT

## 2017-04-22 PROCEDURE — 80048 BASIC METABOLIC PNL TOTAL CA: CPT

## 2017-04-22 RX ORDER — ONDANSETRON 2 MG/ML
4 INJECTION INTRAMUSCULAR; INTRAVENOUS ONCE
Status: COMPLETED | OUTPATIENT
Start: 2017-04-22 | End: 2017-04-22

## 2017-04-22 RX ORDER — MORPHINE SULFATE 4 MG/ML
4 INJECTION, SOLUTION INTRAMUSCULAR; INTRAVENOUS ONCE
Status: COMPLETED | OUTPATIENT
Start: 2017-04-22 | End: 2017-04-22

## 2017-04-22 RX ORDER — ASPIRIN 81 MG/1
324 TABLET, CHEWABLE ORAL ONCE
Status: COMPLETED | OUTPATIENT
Start: 2017-04-22 | End: 2017-04-22

## 2017-04-22 RX ADMIN — ASPIRIN 81 MG 324 MG: 81 TABLET ORAL at 21:45

## 2017-04-22 RX ADMIN — ONDANSETRON 4 MG: 2 INJECTION, SOLUTION INTRAMUSCULAR; INTRAVENOUS at 23:05

## 2017-04-22 RX ADMIN — MORPHINE SULFATE 4 MG: 4 INJECTION, SOLUTION INTRAMUSCULAR; INTRAVENOUS at 23:05

## 2017-04-22 RX ADMIN — IOVERSOL 85 ML: 741 INJECTION INTRA-ARTERIAL; INTRAVENOUS at 22:26

## 2017-04-22 RX ADMIN — MORPHINE SULFATE 4 MG: 4 INJECTION, SOLUTION INTRAMUSCULAR; INTRAVENOUS at 21:44

## 2017-04-22 RX ADMIN — ONDANSETRON 4 MG: 2 INJECTION, SOLUTION INTRAMUSCULAR; INTRAVENOUS at 21:44

## 2017-04-22 ASSESSMENT — ENCOUNTER SYMPTOMS
RHINORRHEA: 0
EYE DISCHARGE: 0
WHEEZING: 0
CONSTIPATION: 0
VOMITING: 1
SHORTNESS OF BREATH: 1
BACK PAIN: 0
BLOOD IN STOOL: 0
COUGH: 0
DIARRHEA: 0
NAUSEA: 1
EYE PAIN: 0
SORE THROAT: 0
ABDOMINAL PAIN: 0

## 2017-04-22 ASSESSMENT — PAIN SCALES - GENERAL
PAINLEVEL_OUTOF10: 7
PAINLEVEL_OUTOF10: 7
PAINLEVEL_OUTOF10: 10

## 2017-04-22 ASSESSMENT — PAIN DESCRIPTION - LOCATION
LOCATION: CHEST
LOCATION: CHEST;SHOULDER

## 2017-04-22 ASSESSMENT — PAIN DESCRIPTION - ORIENTATION: ORIENTATION: LEFT;RIGHT

## 2017-04-22 ASSESSMENT — PAIN DESCRIPTION - ONSET: ONSET: SUDDEN

## 2017-04-22 ASSESSMENT — PAIN DESCRIPTION - FREQUENCY: FREQUENCY: CONTINUOUS

## 2017-04-22 ASSESSMENT — PAIN DESCRIPTION - DESCRIPTORS: DESCRIPTORS: ACHING;PRESSURE

## 2017-04-22 ASSESSMENT — PAIN DESCRIPTION - PAIN TYPE
TYPE: ACUTE PAIN
TYPE: ACUTE PAIN

## 2017-04-23 LAB
POC TROPONIN I: 0 NG/ML (ref 0–0.1)
POC TROPONIN INTERP: NORMAL

## 2017-04-23 RX ORDER — ONDANSETRON 4 MG/1
4 TABLET, FILM COATED ORAL EVERY 8 HOURS PRN
Qty: 5 TABLET | Refills: 0 | Status: SHIPPED | OUTPATIENT
Start: 2017-04-23 | End: 2017-04-23

## 2017-04-23 RX ORDER — PROMETHAZINE HYDROCHLORIDE 25 MG/1
25 TABLET ORAL EVERY 6 HOURS PRN
Qty: 10 TABLET | Refills: 0 | Status: SHIPPED | OUTPATIENT
Start: 2017-04-23 | End: 2017-06-12

## 2017-04-25 LAB
EKG ATRIAL RATE: 109 BPM
EKG P AXIS: 46 DEGREES
EKG P-R INTERVAL: 144 MS
EKG Q-T INTERVAL: 348 MS
EKG QRS DURATION: 86 MS
EKG QTC CALCULATION (BAZETT): 468 MS
EKG R AXIS: 21 DEGREES
EKG T AXIS: 37 DEGREES
EKG VENTRICULAR RATE: 109 BPM

## 2017-04-26 ENCOUNTER — APPOINTMENT (OUTPATIENT)
Dept: GENERAL RADIOLOGY | Age: 37
End: 2017-04-26
Payer: MEDICARE

## 2017-04-26 ENCOUNTER — HOSPITAL ENCOUNTER (OUTPATIENT)
Age: 37
Setting detail: OBSERVATION
Discharge: HOME | End: 2017-04-27
Attending: EMERGENCY MEDICINE | Admitting: EMERGENCY MEDICINE
Payer: MEDICARE

## 2017-04-26 DIAGNOSIS — R10.13 ABDOMINAL PAIN, EPIGASTRIC: ICD-10-CM

## 2017-04-26 DIAGNOSIS — R07.9 CHEST PAIN, RULE OUT ACUTE MYOCARDIAL INFARCTION: Primary | ICD-10-CM

## 2017-04-26 LAB
ABSOLUTE EOS #: 0.07 K/UL (ref 0–0.4)
ABSOLUTE LYMPH #: 1.5 K/UL (ref 1–4.8)
ABSOLUTE MONO #: 0.52 K/UL (ref 0.1–1.2)
ALBUMIN SERPL-MCNC: 3.2 G/DL (ref 3.5–5.2)
ALBUMIN/GLOBULIN RATIO: 1 (ref 1–2.5)
ALP BLD-CCNC: 133 U/L (ref 35–104)
ALT SERPL-CCNC: 68 U/L (ref 5–33)
ANION GAP SERPL CALCULATED.3IONS-SCNC: 14 MMOL/L (ref 9–17)
AST SERPL-CCNC: 17 U/L
BASOPHILS # BLD: 2 %
BASOPHILS ABSOLUTE: 0.13 K/UL (ref 0–0.2)
BETA-HYDROXYBUTYRATE: 0.32 MMOL/L (ref 0.02–0.27)
BILIRUB SERPL-MCNC: 0.36 MG/DL (ref 0.3–1.2)
BILIRUBIN DIRECT: 0.11 MG/DL
BILIRUBIN, INDIRECT: 0.25 MG/DL (ref 0–1)
BNP INTERPRETATION: NORMAL
BUN BLDV-MCNC: 6 MG/DL (ref 6–20)
BUN/CREAT BLD: ABNORMAL (ref 9–20)
CALCIUM SERPL-MCNC: 8.8 MG/DL (ref 8.6–10.4)
CHLORIDE BLD-SCNC: 102 MMOL/L (ref 98–107)
CO2: 22 MMOL/L (ref 20–31)
CREAT SERPL-MCNC: 0.38 MG/DL (ref 0.5–0.9)
DIFFERENTIAL TYPE: NORMAL
EOSINOPHILS RELATIVE PERCENT: 1 %
GFR AFRICAN AMERICAN: >60 ML/MIN
GFR NON-AFRICAN AMERICAN: >60 ML/MIN
GFR SERPL CREATININE-BSD FRML MDRD: ABNORMAL ML/MIN/{1.73_M2}
GFR SERPL CREATININE-BSD FRML MDRD: ABNORMAL ML/MIN/{1.73_M2}
GLOBULIN: ABNORMAL G/DL (ref 1.5–3.8)
GLUCOSE BLD-MCNC: 135 MG/DL (ref 65–105)
GLUCOSE BLD-MCNC: 192 MG/DL (ref 65–105)
GLUCOSE BLD-MCNC: 192 MG/DL (ref 70–99)
HCT VFR BLD CALC: 38.1 % (ref 36–46)
HEMOGLOBIN: 12.7 G/DL (ref 12–16)
LIPASE: 17 U/L (ref 13–60)
LYMPHOCYTES # BLD: 23 %
MCH RBC QN AUTO: 30.2 PG (ref 26–34)
MCHC RBC AUTO-ENTMCNC: 33.3 G/DL (ref 31–37)
MCV RBC AUTO: 90.7 FL (ref 80–100)
MONOCYTES # BLD: 8 %
MORPHOLOGY: NORMAL
PDW BLD-RTO: 14.9 % (ref 12.5–15.4)
PLATELET # BLD: 337 K/UL (ref 140–450)
PLATELET ESTIMATE: NORMAL
PMV BLD AUTO: 10.2 FL (ref 6–12)
POC TROPONIN I: 0 NG/ML (ref 0–0.1)
POC TROPONIN I: 0.01 NG/ML (ref 0–0.1)
POC TROPONIN INTERP: NORMAL
POC TROPONIN INTERP: NORMAL
POTASSIUM SERPL-SCNC: 3.7 MMOL/L (ref 3.7–5.3)
PRO-BNP: 153 PG/ML
RBC # BLD: 4.2 M/UL (ref 4–5.2)
RBC # BLD: NORMAL 10*6/UL
SEG NEUTROPHILS: 66 %
SEGMENTED NEUTROPHILS ABSOLUTE COUNT: 4.28 K/UL (ref 1.8–7.7)
SODIUM BLD-SCNC: 138 MMOL/L (ref 135–144)
TOTAL PROTEIN: 6.4 G/DL (ref 6.4–8.3)
TROPONIN INTERP: NORMAL
TROPONIN T: <0.03 NG/ML
WBC # BLD: 6.5 K/UL (ref 3.5–11)
WBC # BLD: NORMAL 10*3/UL

## 2017-04-26 PROCEDURE — G0378 HOSPITAL OBSERVATION PER HR: HCPCS

## 2017-04-26 PROCEDURE — 80076 HEPATIC FUNCTION PANEL: CPT

## 2017-04-26 PROCEDURE — 96374 THER/PROPH/DIAG INJ IV PUSH: CPT

## 2017-04-26 PROCEDURE — 96361 HYDRATE IV INFUSION ADD-ON: CPT

## 2017-04-26 PROCEDURE — 2580000003 HC RX 258: Performed by: EMERGENCY MEDICINE

## 2017-04-26 PROCEDURE — 99285 EMERGENCY DEPT VISIT HI MDM: CPT

## 2017-04-26 PROCEDURE — 96375 TX/PRO/DX INJ NEW DRUG ADDON: CPT

## 2017-04-26 PROCEDURE — 6360000002 HC RX W HCPCS: Performed by: EMERGENCY MEDICINE

## 2017-04-26 PROCEDURE — 71020 XR CHEST STANDARD TWO VW: CPT

## 2017-04-26 PROCEDURE — 84484 ASSAY OF TROPONIN QUANT: CPT

## 2017-04-26 PROCEDURE — 6370000000 HC RX 637 (ALT 250 FOR IP): Performed by: EMERGENCY MEDICINE

## 2017-04-26 PROCEDURE — 82010 KETONE BODYS QUAN: CPT

## 2017-04-26 PROCEDURE — 80048 BASIC METABOLIC PNL TOTAL CA: CPT

## 2017-04-26 PROCEDURE — 36415 COLL VENOUS BLD VENIPUNCTURE: CPT

## 2017-04-26 PROCEDURE — 82947 ASSAY GLUCOSE BLOOD QUANT: CPT

## 2017-04-26 PROCEDURE — 96376 TX/PRO/DX INJ SAME DRUG ADON: CPT

## 2017-04-26 PROCEDURE — 83690 ASSAY OF LIPASE: CPT

## 2017-04-26 PROCEDURE — 93005 ELECTROCARDIOGRAM TRACING: CPT

## 2017-04-26 PROCEDURE — 83880 ASSAY OF NATRIURETIC PEPTIDE: CPT

## 2017-04-26 PROCEDURE — 85025 COMPLETE CBC W/AUTO DIFF WBC: CPT

## 2017-04-26 PROCEDURE — 96372 THER/PROPH/DIAG INJ SC/IM: CPT

## 2017-04-26 RX ORDER — LANCETS 30 GAUGE
1 EACH MISCELLANEOUS 3 TIMES DAILY
Status: DISCONTINUED | OUTPATIENT
Start: 2017-04-26 | End: 2017-04-26 | Stop reason: CLARIF

## 2017-04-26 RX ORDER — SODIUM CHLORIDE 0.9 % (FLUSH) 0.9 %
10 SYRINGE (ML) INJECTION PRN
Status: DISCONTINUED | OUTPATIENT
Start: 2017-04-26 | End: 2017-04-27 | Stop reason: HOSPADM

## 2017-04-26 RX ORDER — LORAZEPAM 1 MG/1
2 TABLET ORAL 3 TIMES DAILY PRN
COMMUNITY
End: 2019-12-20 | Stop reason: DRUGHIGH

## 2017-04-26 RX ORDER — LISINOPRIL 5 MG/1
5 TABLET ORAL
Status: DISCONTINUED | OUTPATIENT
Start: 2017-04-26 | End: 2017-04-27 | Stop reason: HOSPADM

## 2017-04-26 RX ORDER — DEXTROSE MONOHYDRATE 25 G/50ML
12.5 INJECTION, SOLUTION INTRAVENOUS PRN
Status: DISCONTINUED | OUTPATIENT
Start: 2017-04-26 | End: 2017-04-27 | Stop reason: HOSPADM

## 2017-04-26 RX ORDER — FONDAPARINUX SODIUM 5 MG/.4ML
10 INJECTION SUBCUTANEOUS DAILY
Status: DISCONTINUED | OUTPATIENT
Start: 2017-04-26 | End: 2017-04-27 | Stop reason: HOSPADM

## 2017-04-26 RX ORDER — MORPHINE SULFATE 2 MG/ML
2 INJECTION, SOLUTION INTRAMUSCULAR; INTRAVENOUS
Status: DISCONTINUED | OUTPATIENT
Start: 2017-04-26 | End: 2017-04-27 | Stop reason: HOSPADM

## 2017-04-26 RX ORDER — DULOXETIN HYDROCHLORIDE 30 MG/1
60 CAPSULE, DELAYED RELEASE ORAL DAILY
Status: DISCONTINUED | OUTPATIENT
Start: 2017-04-26 | End: 2017-04-27 | Stop reason: HOSPADM

## 2017-04-26 RX ORDER — DULOXETINE 40 MG/1
40 CAPSULE, DELAYED RELEASE ORAL DAILY
Status: DISCONTINUED | OUTPATIENT
Start: 2017-04-26 | End: 2017-04-26 | Stop reason: RX

## 2017-04-26 RX ORDER — ACETAMINOPHEN 325 MG/1
650 TABLET ORAL EVERY 4 HOURS PRN
Status: DISCONTINUED | OUTPATIENT
Start: 2017-04-26 | End: 2017-04-27 | Stop reason: HOSPADM

## 2017-04-26 RX ORDER — FENTANYL CITRATE 50 UG/ML
25 INJECTION, SOLUTION INTRAMUSCULAR; INTRAVENOUS ONCE
Status: COMPLETED | OUTPATIENT
Start: 2017-04-26 | End: 2017-04-26

## 2017-04-26 RX ORDER — AMINOPHYLLINE DIHYDRATE 25 MG/ML
100 INJECTION, SOLUTION INTRAVENOUS
Status: DISCONTINUED | OUTPATIENT
Start: 2017-04-26 | End: 2017-04-26

## 2017-04-26 RX ORDER — 0.9 % SODIUM CHLORIDE 0.9 %
1000 INTRAVENOUS SOLUTION INTRAVENOUS ONCE
Status: COMPLETED | OUTPATIENT
Start: 2017-04-26 | End: 2017-04-26

## 2017-04-26 RX ORDER — MORPHINE SULFATE 4 MG/ML
4 INJECTION, SOLUTION INTRAMUSCULAR; INTRAVENOUS ONCE
Status: COMPLETED | OUTPATIENT
Start: 2017-04-26 | End: 2017-04-26

## 2017-04-26 RX ORDER — SODIUM CHLORIDE 9 MG/ML
INJECTION, SOLUTION INTRAVENOUS CONTINUOUS
Status: DISCONTINUED | OUTPATIENT
Start: 2017-04-26 | End: 2017-04-27 | Stop reason: HOSPADM

## 2017-04-26 RX ORDER — NICOTINE POLACRILEX 4 MG
15 LOZENGE BUCCAL PRN
Status: DISCONTINUED | OUTPATIENT
Start: 2017-04-26 | End: 2017-04-27 | Stop reason: HOSPADM

## 2017-04-26 RX ORDER — ONDANSETRON 2 MG/ML
4 INJECTION INTRAMUSCULAR; INTRAVENOUS ONCE
Status: COMPLETED | OUTPATIENT
Start: 2017-04-26 | End: 2017-04-26

## 2017-04-26 RX ORDER — DILTIAZEM HYDROCHLORIDE 240 MG/1
240 CAPSULE, COATED, EXTENDED RELEASE ORAL DAILY
Status: DISCONTINUED | OUTPATIENT
Start: 2017-04-26 | End: 2017-04-27 | Stop reason: HOSPADM

## 2017-04-26 RX ORDER — DEXTROSE MONOHYDRATE 50 MG/ML
100 INJECTION, SOLUTION INTRAVENOUS PRN
Status: DISCONTINUED | OUTPATIENT
Start: 2017-04-26 | End: 2017-04-27 | Stop reason: HOSPADM

## 2017-04-26 RX ORDER — SODIUM CHLORIDE 0.9 % (FLUSH) 0.9 %
10 SYRINGE (ML) INJECTION PRN
Status: ACTIVE | OUTPATIENT
Start: 2017-04-26 | End: 2017-04-26

## 2017-04-26 RX ORDER — SODIUM CHLORIDE 9 MG/ML
50 INJECTION, SOLUTION INTRAVENOUS ONCE
Status: DISCONTINUED | OUTPATIENT
Start: 2017-04-26 | End: 2017-04-27 | Stop reason: SDUPTHER

## 2017-04-26 RX ORDER — PROMETHAZINE HYDROCHLORIDE 25 MG/1
25 TABLET ORAL EVERY 6 HOURS PRN
Status: DISCONTINUED | OUTPATIENT
Start: 2017-04-26 | End: 2017-04-27 | Stop reason: HOSPADM

## 2017-04-26 RX ORDER — MORPHINE SULFATE 4 MG/ML
4 INJECTION, SOLUTION INTRAMUSCULAR; INTRAVENOUS
Status: DISCONTINUED | OUTPATIENT
Start: 2017-04-26 | End: 2017-04-27 | Stop reason: HOSPADM

## 2017-04-26 RX ORDER — METOPROLOL TARTRATE 5 MG/5ML
2.5 INJECTION INTRAVENOUS PRN
Status: ACTIVE | OUTPATIENT
Start: 2017-04-26 | End: 2017-04-26

## 2017-04-26 RX ORDER — BUMETANIDE 1 MG/1
2 TABLET ORAL DAILY
Status: DISCONTINUED | OUTPATIENT
Start: 2017-04-26 | End: 2017-04-27 | Stop reason: HOSPADM

## 2017-04-26 RX ORDER — ESOMEPRAZOLE MAGNESIUM 40 MG/1
40 FOR SUSPENSION ORAL DAILY
Status: ON HOLD | COMMUNITY
End: 2018-11-17

## 2017-04-26 RX ORDER — ZOLPIDEM TARTRATE 5 MG/1
5 TABLET ORAL NIGHTLY PRN
COMMUNITY
End: 2019-02-22

## 2017-04-26 RX ORDER — ONDANSETRON 2 MG/ML
4 INJECTION INTRAMUSCULAR; INTRAVENOUS EVERY 6 HOURS PRN
Status: ACTIVE | OUTPATIENT
Start: 2017-04-26 | End: 2017-04-27

## 2017-04-26 RX ORDER — SODIUM CHLORIDE 0.9 % (FLUSH) 0.9 %
10 SYRINGE (ML) INJECTION EVERY 12 HOURS SCHEDULED
Status: DISCONTINUED | OUTPATIENT
Start: 2017-04-26 | End: 2017-04-27 | Stop reason: HOSPADM

## 2017-04-26 RX ORDER — INSULIN GLARGINE 100 [IU]/ML
30 INJECTION, SOLUTION SUBCUTANEOUS NIGHTLY
Status: DISCONTINUED | OUTPATIENT
Start: 2017-04-26 | End: 2017-04-27 | Stop reason: HOSPADM

## 2017-04-26 RX ORDER — LORAZEPAM 2 MG/1
2 TABLET ORAL NIGHTLY PRN
COMMUNITY
End: 2019-02-22

## 2017-04-26 RX ORDER — NITROGLYCERIN 0.4 MG/1
0.4 TABLET SUBLINGUAL EVERY 5 MIN PRN
Status: ACTIVE | OUTPATIENT
Start: 2017-04-26 | End: 2017-04-26

## 2017-04-26 RX ADMIN — ONDANSETRON 4 MG: 2 INJECTION, SOLUTION INTRAMUSCULAR; INTRAVENOUS at 10:10

## 2017-04-26 RX ADMIN — MORPHINE SULFATE 4 MG: 4 INJECTION, SOLUTION INTRAMUSCULAR; INTRAVENOUS at 16:22

## 2017-04-26 RX ADMIN — SODIUM CHLORIDE 1000 ML: 9 INJECTION, SOLUTION INTRAVENOUS at 10:10

## 2017-04-26 RX ADMIN — FENTANYL CITRATE 25 MCG: 50 INJECTION INTRAMUSCULAR; INTRAVENOUS at 10:40

## 2017-04-26 RX ADMIN — SODIUM CHLORIDE: 9 INJECTION, SOLUTION INTRAVENOUS at 15:08

## 2017-04-26 RX ADMIN — MORPHINE SULFATE 4 MG: 4 INJECTION, SOLUTION INTRAMUSCULAR; INTRAVENOUS at 11:37

## 2017-04-26 RX ADMIN — MORPHINE SULFATE 4 MG: 4 INJECTION, SOLUTION INTRAMUSCULAR; INTRAVENOUS at 18:27

## 2017-04-26 RX ADMIN — MAGNESIUM GLUCONATE 500 MG ORAL TABLET 400 MG: 500 TABLET ORAL at 21:00

## 2017-04-26 RX ADMIN — PROCHLORPERAZINE EDISYLATE 10 MG: 5 INJECTION INTRAMUSCULAR; INTRAVENOUS at 11:37

## 2017-04-26 RX ADMIN — INSULIN GLARGINE 30 UNITS: 100 INJECTION, SOLUTION SUBCUTANEOUS at 20:57

## 2017-04-26 RX ADMIN — MORPHINE SULFATE 4 MG: 4 INJECTION, SOLUTION INTRAMUSCULAR; INTRAVENOUS at 14:03

## 2017-04-26 RX ADMIN — FENTANYL CITRATE 25 MCG: 50 INJECTION INTRAMUSCULAR; INTRAVENOUS at 10:11

## 2017-04-26 RX ADMIN — MORPHINE SULFATE 4 MG: 4 INJECTION, SOLUTION INTRAMUSCULAR; INTRAVENOUS at 21:01

## 2017-04-26 RX ADMIN — LISINOPRIL 5 MG: 5 TABLET ORAL at 15:08

## 2017-04-26 RX ADMIN — FONDAPARINUX SODIUM 10 MG: 5 INJECTION, SOLUTION SUBCUTANEOUS at 15:08

## 2017-04-26 ASSESSMENT — PAIN DESCRIPTION - FREQUENCY
FREQUENCY: CONTINUOUS

## 2017-04-26 ASSESSMENT — PAIN DESCRIPTION - ONSET
ONSET: ON-GOING
ONSET: ON-GOING

## 2017-04-26 ASSESSMENT — PAIN SCALES - GENERAL
PAINLEVEL_OUTOF10: 7
PAINLEVEL_OUTOF10: 6
PAINLEVEL_OUTOF10: 7
PAINLEVEL_OUTOF10: 6
PAINLEVEL_OUTOF10: 7
PAINLEVEL_OUTOF10: 7

## 2017-04-26 ASSESSMENT — PAIN DESCRIPTION - LOCATION
LOCATION: CHEST

## 2017-04-26 ASSESSMENT — HEART SCORE
ECG: 1
ECG: 1

## 2017-04-26 ASSESSMENT — PAIN DESCRIPTION - DESCRIPTORS
DESCRIPTORS: CONSTANT
DESCRIPTORS: PRESSURE
DESCRIPTORS: PRESSURE

## 2017-04-26 ASSESSMENT — ENCOUNTER SYMPTOMS
EYES NEGATIVE: 1
ALLERGIC/IMMUNOLOGIC NEGATIVE: 1
CHEST TIGHTNESS: 1
COUGH: 1
GASTROINTESTINAL NEGATIVE: 1

## 2017-04-26 ASSESSMENT — PAIN DESCRIPTION - PAIN TYPE
TYPE: ACUTE PAIN

## 2017-04-26 ASSESSMENT — PAIN DESCRIPTION - ORIENTATION
ORIENTATION: LEFT;RIGHT
ORIENTATION: LEFT;RIGHT
ORIENTATION: MID

## 2017-04-26 ASSESSMENT — PAIN DESCRIPTION - PROGRESSION
CLINICAL_PROGRESSION: NOT CHANGED
CLINICAL_PROGRESSION: GRADUALLY WORSENING

## 2017-04-27 ENCOUNTER — APPOINTMENT (OUTPATIENT)
Dept: NUCLEAR MEDICINE | Age: 37
End: 2017-04-27
Payer: MEDICARE

## 2017-04-27 VITALS
BODY MASS INDEX: 44.41 KG/M2 | RESPIRATION RATE: 18 BRPM | WEIGHT: 293 LBS | SYSTOLIC BLOOD PRESSURE: 110 MMHG | OXYGEN SATURATION: 94 % | TEMPERATURE: 99.2 F | DIASTOLIC BLOOD PRESSURE: 73 MMHG | HEIGHT: 68 IN | HEART RATE: 90 BPM

## 2017-04-27 LAB
ANION GAP SERPL CALCULATED.3IONS-SCNC: 15 MMOL/L (ref 9–17)
BETA-HYDROXYBUTYRATE: 0.13 MMOL/L (ref 0.02–0.27)
BUN BLDV-MCNC: 7 MG/DL (ref 6–20)
BUN/CREAT BLD: ABNORMAL (ref 9–20)
CALCIUM SERPL-MCNC: 8.8 MG/DL (ref 8.6–10.4)
CHLORIDE BLD-SCNC: 99 MMOL/L (ref 98–107)
CO2: 21 MMOL/L (ref 20–31)
CREAT SERPL-MCNC: 0.42 MG/DL (ref 0.5–0.9)
EKG ATRIAL RATE: 108 BPM
EKG ATRIAL RATE: 85 BPM
EKG P AXIS: 60 DEGREES
EKG P-R INTERVAL: 136 MS
EKG P-R INTERVAL: 144 MS
EKG Q-T INTERVAL: 338 MS
EKG Q-T INTERVAL: 370 MS
EKG QRS DURATION: 102 MS
EKG QRS DURATION: 88 MS
EKG QTC CALCULATION (BAZETT): 440 MS
EKG QTC CALCULATION (BAZETT): 452 MS
EKG R AXIS: 4 DEGREES
EKG R AXIS: 50 DEGREES
EKG T AXIS: -32 DEGREES
EKG T AXIS: -41 DEGREES
EKG VENTRICULAR RATE: 108 BPM
EKG VENTRICULAR RATE: 85 BPM
GFR AFRICAN AMERICAN: >60 ML/MIN
GFR NON-AFRICAN AMERICAN: >60 ML/MIN
GFR SERPL CREATININE-BSD FRML MDRD: ABNORMAL ML/MIN/{1.73_M2}
GFR SERPL CREATININE-BSD FRML MDRD: ABNORMAL ML/MIN/{1.73_M2}
GLUCOSE BLD-MCNC: 134 MG/DL (ref 70–99)
GLUCOSE BLD-MCNC: 232 MG/DL (ref 65–105)
GLUCOSE BLD-MCNC: 97 MG/DL (ref 65–105)
POTASSIUM SERPL-SCNC: 4.2 MMOL/L (ref 3.7–5.3)
SODIUM BLD-SCNC: 135 MMOL/L (ref 135–144)
TROPONIN INTERP: NORMAL
TROPONIN T: <0.03 NG/ML

## 2017-04-27 PROCEDURE — 36415 COLL VENOUS BLD VENIPUNCTURE: CPT

## 2017-04-27 PROCEDURE — 6370000000 HC RX 637 (ALT 250 FOR IP): Performed by: EMERGENCY MEDICINE

## 2017-04-27 PROCEDURE — 6360000002 HC RX W HCPCS: Performed by: INTERNAL MEDICINE

## 2017-04-27 PROCEDURE — 93017 CV STRESS TEST TRACING ONLY: CPT

## 2017-04-27 PROCEDURE — 84484 ASSAY OF TROPONIN QUANT: CPT

## 2017-04-27 PROCEDURE — 2580000003 HC RX 258: Performed by: INTERNAL MEDICINE

## 2017-04-27 PROCEDURE — 96376 TX/PRO/DX INJ SAME DRUG ADON: CPT

## 2017-04-27 PROCEDURE — 82947 ASSAY GLUCOSE BLOOD QUANT: CPT

## 2017-04-27 PROCEDURE — 96372 THER/PROPH/DIAG INJ SC/IM: CPT

## 2017-04-27 PROCEDURE — 6360000002 HC RX W HCPCS: Performed by: EMERGENCY MEDICINE

## 2017-04-27 PROCEDURE — A9500 TC99M SESTAMIBI: HCPCS | Performed by: INTERNAL MEDICINE

## 2017-04-27 PROCEDURE — 82010 KETONE BODYS QUAN: CPT

## 2017-04-27 PROCEDURE — 78452 HT MUSCLE IMAGE SPECT MULT: CPT

## 2017-04-27 PROCEDURE — 3430000000 HC RX DIAGNOSTIC RADIOPHARMACEUTICAL: Performed by: INTERNAL MEDICINE

## 2017-04-27 PROCEDURE — 2580000003 HC RX 258: Performed by: EMERGENCY MEDICINE

## 2017-04-27 PROCEDURE — 80048 BASIC METABOLIC PNL TOTAL CA: CPT

## 2017-04-27 PROCEDURE — G0378 HOSPITAL OBSERVATION PER HR: HCPCS

## 2017-04-27 PROCEDURE — 96361 HYDRATE IV INFUSION ADD-ON: CPT

## 2017-04-27 RX ORDER — AMINOPHYLLINE DIHYDRATE 25 MG/ML
100 INJECTION, SOLUTION INTRAVENOUS
Status: COMPLETED | OUTPATIENT
Start: 2017-04-27 | End: 2017-04-27

## 2017-04-27 RX ORDER — SODIUM CHLORIDE 9 MG/ML
INJECTION, SOLUTION INTRAVENOUS ONCE
Status: COMPLETED | OUTPATIENT
Start: 2017-04-27 | End: 2017-04-27

## 2017-04-27 RX ORDER — SODIUM CHLORIDE 0.9 % (FLUSH) 0.9 %
10 SYRINGE (ML) INJECTION PRN
Status: DISCONTINUED | OUTPATIENT
Start: 2017-04-27 | End: 2017-04-27 | Stop reason: HOSPADM

## 2017-04-27 RX ORDER — LORAZEPAM 1 MG/1
2 TABLET ORAL NIGHTLY PRN
Status: DISCONTINUED | OUTPATIENT
Start: 2017-04-27 | End: 2017-04-27 | Stop reason: HOSPADM

## 2017-04-27 RX ORDER — LORAZEPAM 2 MG/ML
1 INJECTION INTRAMUSCULAR EVERY 6 HOURS PRN
Status: DISCONTINUED | OUTPATIENT
Start: 2017-04-27 | End: 2017-04-27 | Stop reason: HOSPADM

## 2017-04-27 RX ORDER — NITROGLYCERIN 0.4 MG/1
0.4 TABLET SUBLINGUAL EVERY 5 MIN PRN
Status: DISCONTINUED | OUTPATIENT
Start: 2017-04-27 | End: 2017-04-27

## 2017-04-27 RX ORDER — METOPROLOL TARTRATE 5 MG/5ML
2.5 INJECTION INTRAVENOUS PRN
Status: DISCONTINUED | OUTPATIENT
Start: 2017-04-27 | End: 2017-04-27

## 2017-04-27 RX ORDER — SODIUM CHLORIDE 0.9 % (FLUSH) 0.9 %
10 SYRINGE (ML) INJECTION PRN
Status: DISCONTINUED | OUTPATIENT
Start: 2017-04-27 | End: 2017-04-27

## 2017-04-27 RX ADMIN — LISINOPRIL 5 MG: 5 TABLET ORAL at 14:49

## 2017-04-27 RX ADMIN — LORAZEPAM 2 MG: 1 TABLET ORAL at 14:46

## 2017-04-27 RX ADMIN — MORPHINE SULFATE 2 MG: 2 INJECTION, SOLUTION INTRAMUSCULAR; INTRAVENOUS at 04:30

## 2017-04-27 RX ADMIN — SODIUM CHLORIDE, PRESERVATIVE FREE 10 ML: 5 INJECTION INTRAVENOUS at 07:53

## 2017-04-27 RX ADMIN — SODIUM CHLORIDE: 9 INJECTION, SOLUTION INTRAVENOUS at 09:41

## 2017-04-27 RX ADMIN — MORPHINE SULFATE 4 MG: 4 INJECTION, SOLUTION INTRAMUSCULAR; INTRAVENOUS at 11:53

## 2017-04-27 RX ADMIN — SODIUM CHLORIDE: 9 INJECTION, SOLUTION INTRAVENOUS at 00:17

## 2017-04-27 RX ADMIN — Medication 10 ML: at 09:41

## 2017-04-27 RX ADMIN — Medication 10 ML: at 11:54

## 2017-04-27 RX ADMIN — TETRAKIS(2-METHOXYISOBUTYLISOCYANIDE)COPPER(I) TETRAFLUOROBORATE 46.9 MILLICURIE: 1 INJECTION, POWDER, LYOPHILIZED, FOR SOLUTION INTRAVENOUS at 10:04

## 2017-04-27 RX ADMIN — SODIUM CHLORIDE, PRESERVATIVE FREE 10 ML: 5 INJECTION INTRAVENOUS at 10:04

## 2017-04-27 RX ADMIN — SODIUM CHLORIDE: 9 INJECTION, SOLUTION INTRAVENOUS at 06:43

## 2017-04-27 RX ADMIN — MORPHINE SULFATE 4 MG: 4 INJECTION, SOLUTION INTRAMUSCULAR; INTRAVENOUS at 02:31

## 2017-04-27 RX ADMIN — DILTIAZEM HYDROCHLORIDE 240 MG: 240 CAPSULE, COATED, EXTENDED RELEASE ORAL at 14:46

## 2017-04-27 RX ADMIN — PROMETHAZINE HYDROCHLORIDE 25 MG: 25 TABLET ORAL at 14:49

## 2017-04-27 RX ADMIN — MORPHINE SULFATE 2 MG: 2 INJECTION, SOLUTION INTRAMUSCULAR; INTRAVENOUS at 06:41

## 2017-04-27 RX ADMIN — MORPHINE SULFATE 2 MG: 2 INJECTION, SOLUTION INTRAMUSCULAR; INTRAVENOUS at 14:46

## 2017-04-27 RX ADMIN — REGADENOSON 0.4 MG: 0.08 INJECTION, SOLUTION INTRAVENOUS at 10:03

## 2017-04-27 RX ADMIN — MORPHINE SULFATE 4 MG: 4 INJECTION, SOLUTION INTRAMUSCULAR; INTRAVENOUS at 00:14

## 2017-04-27 RX ADMIN — Medication 10 ML: at 10:03

## 2017-04-27 RX ADMIN — BUMETANIDE 2 MG: 1 TABLET ORAL at 14:48

## 2017-04-27 RX ADMIN — DULOXETINE HYDROCHLORIDE 60 MG: 30 CAPSULE, DELAYED RELEASE ORAL at 14:48

## 2017-04-27 RX ADMIN — MORPHINE SULFATE 2 MG: 2 INJECTION, SOLUTION INTRAMUSCULAR; INTRAVENOUS at 08:36

## 2017-04-27 RX ADMIN — FONDAPARINUX SODIUM 10 MG: 5 INJECTION, SOLUTION SUBCUTANEOUS at 14:45

## 2017-04-27 RX ADMIN — VITAMIN D, TAB 1000IU (100/BT) 1000 UNITS: 25 TAB at 14:49

## 2017-04-27 RX ADMIN — AMINOPHYLLINE 100 MG: 25 INJECTION, SOLUTION INTRAVENOUS at 10:06

## 2017-04-27 RX ADMIN — TETRAKIS(2-METHOXYISOBUTYLISOCYANIDE)COPPER(I) TETRAFLUOROBORATE 19.5 MILLICURIE: 1 INJECTION, POWDER, LYOPHILIZED, FOR SOLUTION INTRAVENOUS at 07:53

## 2017-04-27 RX ADMIN — MAGNESIUM GLUCONATE 500 MG ORAL TABLET 400 MG: 500 TABLET ORAL at 14:47

## 2017-04-27 ASSESSMENT — PAIN SCALES - GENERAL
PAINLEVEL_OUTOF10: 4
PAINLEVEL_OUTOF10: 6
PAINLEVEL_OUTOF10: 6
PAINLEVEL_OUTOF10: 4
PAINLEVEL_OUTOF10: 5
PAINLEVEL_OUTOF10: 9
PAINLEVEL_OUTOF10: 4
PAINLEVEL_OUTOF10: 6
PAINLEVEL_OUTOF10: 6
PAINLEVEL_OUTOF10: 7
PAINLEVEL_OUTOF10: 7

## 2017-04-27 ASSESSMENT — PAIN DESCRIPTION - LOCATION: LOCATION: CHEST

## 2017-04-27 ASSESSMENT — PAIN DESCRIPTION - ORIENTATION: ORIENTATION: LEFT

## 2017-04-27 ASSESSMENT — PAIN DESCRIPTION - PROGRESSION
CLINICAL_PROGRESSION: GRADUALLY IMPROVING
CLINICAL_PROGRESSION: GRADUALLY IMPROVING

## 2017-04-27 ASSESSMENT — PAIN DESCRIPTION - DESCRIPTORS: DESCRIPTORS: PRESSURE

## 2017-04-27 ASSESSMENT — PAIN DESCRIPTION - ONSET: ONSET: ON-GOING

## 2017-04-27 ASSESSMENT — PAIN DESCRIPTION - FREQUENCY: FREQUENCY: CONTINUOUS

## 2017-04-27 ASSESSMENT — PAIN DESCRIPTION - DIRECTION: RADIATING_TOWARDS: RT ARM

## 2017-04-27 ASSESSMENT — PAIN DESCRIPTION - PAIN TYPE: TYPE: ACUTE PAIN

## 2017-05-23 ENCOUNTER — HOSPITAL ENCOUNTER (EMERGENCY)
Age: 37
Discharge: HOME | End: 2017-05-23
Attending: EMERGENCY MEDICINE
Payer: MEDICARE

## 2017-05-23 VITALS
HEART RATE: 98 BPM | TEMPERATURE: 98.2 F | SYSTOLIC BLOOD PRESSURE: 139 MMHG | DIASTOLIC BLOOD PRESSURE: 90 MMHG | RESPIRATION RATE: 18 BRPM | BODY MASS INDEX: 44.41 KG/M2 | OXYGEN SATURATION: 98 % | HEIGHT: 68 IN | WEIGHT: 293 LBS

## 2017-05-23 DIAGNOSIS — L29.9 PRURITIC CONDITION: Primary | ICD-10-CM

## 2017-05-23 PROCEDURE — 99282 EMERGENCY DEPT VISIT SF MDM: CPT

## 2017-05-23 PROCEDURE — 6360000002 HC RX W HCPCS: Performed by: EMERGENCY MEDICINE

## 2017-05-23 PROCEDURE — 6370000000 HC RX 637 (ALT 250 FOR IP): Performed by: EMERGENCY MEDICINE

## 2017-05-23 RX ORDER — DEXAMETHASONE SODIUM PHOSPHATE 4 MG/ML
10 INJECTION, SOLUTION INTRA-ARTICULAR; INTRALESIONAL; INTRAMUSCULAR; INTRAVENOUS; SOFT TISSUE ONCE
Status: COMPLETED | OUTPATIENT
Start: 2017-05-23 | End: 2017-05-23

## 2017-05-23 RX ORDER — HYDROXYZINE HYDROCHLORIDE 25 MG/1
25 TABLET, FILM COATED ORAL EVERY 6 HOURS PRN
Qty: 20 TABLET | Refills: 0 | Status: SHIPPED | OUTPATIENT
Start: 2017-05-23 | End: 2017-06-02

## 2017-05-23 RX ORDER — HYDROXYZINE HYDROCHLORIDE 25 MG/1
25 TABLET, FILM COATED ORAL ONCE
Status: COMPLETED | OUTPATIENT
Start: 2017-05-23 | End: 2017-05-23

## 2017-05-23 RX ADMIN — DEXAMETHASONE SODIUM PHOSPHATE 10 MG: 4 INJECTION, SOLUTION INTRAMUSCULAR; INTRAVENOUS at 07:05

## 2017-05-23 RX ADMIN — HYDROXYZINE HYDROCHLORIDE 25 MG: 25 TABLET, FILM COATED ORAL at 07:06

## 2017-05-23 ASSESSMENT — ENCOUNTER SYMPTOMS
DIARRHEA: 0
VOMITING: 0
EYE PAIN: 0
NAUSEA: 0
BACK PAIN: 0
COUGH: 0
SORE THROAT: 0
ABDOMINAL PAIN: 0
SHORTNESS OF BREATH: 0

## 2017-06-02 ENCOUNTER — APPOINTMENT (OUTPATIENT)
Dept: GENERAL RADIOLOGY | Age: 37
End: 2017-06-02
Payer: MEDICARE

## 2017-06-02 ENCOUNTER — HOSPITAL ENCOUNTER (EMERGENCY)
Age: 37
Discharge: HOME | End: 2017-06-02
Attending: EMERGENCY MEDICINE
Payer: MEDICARE

## 2017-06-02 ENCOUNTER — APPOINTMENT (OUTPATIENT)
Dept: CT IMAGING | Age: 37
End: 2017-06-02
Payer: MEDICARE

## 2017-06-02 VITALS
HEART RATE: 95 BPM | TEMPERATURE: 97.2 F | SYSTOLIC BLOOD PRESSURE: 155 MMHG | DIASTOLIC BLOOD PRESSURE: 91 MMHG | RESPIRATION RATE: 20 BRPM | BODY MASS INDEX: 44.41 KG/M2 | WEIGHT: 293 LBS | OXYGEN SATURATION: 95 % | HEIGHT: 68 IN

## 2017-06-02 DIAGNOSIS — R06.02 SHORTNESS OF BREATH: Primary | ICD-10-CM

## 2017-06-02 LAB
ABSOLUTE EOS #: 0.2 K/UL (ref 0–0.4)
ABSOLUTE LYMPH #: 1.8 K/UL (ref 1–4.8)
ABSOLUTE MONO #: 0.5 K/UL (ref 0.1–1.2)
ANION GAP SERPL CALCULATED.3IONS-SCNC: 17 MMOL/L (ref 9–17)
BASOPHILS # BLD: 1 %
BASOPHILS ABSOLUTE: 0 K/UL (ref 0–0.2)
BUN BLDV-MCNC: 12 MG/DL (ref 6–20)
BUN/CREAT BLD: ABNORMAL (ref 9–20)
CALCIUM SERPL-MCNC: 8.9 MG/DL (ref 8.6–10.4)
CHLORIDE BLD-SCNC: 99 MMOL/L (ref 98–107)
CO2: 18 MMOL/L (ref 20–31)
CREAT SERPL-MCNC: 0.5 MG/DL (ref 0.5–0.9)
DIFFERENTIAL TYPE: NORMAL
EOSINOPHILS RELATIVE PERCENT: 2 %
GFR AFRICAN AMERICAN: >60 ML/MIN
GFR NON-AFRICAN AMERICAN: >60 ML/MIN
GFR SERPL CREATININE-BSD FRML MDRD: ABNORMAL ML/MIN/{1.73_M2}
GFR SERPL CREATININE-BSD FRML MDRD: ABNORMAL ML/MIN/{1.73_M2}
GLUCOSE BLD-MCNC: 320 MG/DL (ref 70–99)
HCT VFR BLD CALC: 41.4 % (ref 36–46)
HEMOGLOBIN: 14.1 G/DL (ref 12–16)
INR BLD: 0.9
LYMPHOCYTES # BLD: 26 %
MCH RBC QN AUTO: 30.3 PG (ref 26–34)
MCHC RBC AUTO-ENTMCNC: 34 G/DL (ref 31–37)
MCV RBC AUTO: 89.2 FL (ref 80–100)
MONOCYTES # BLD: 7 %
PARTIAL THROMBOPLASTIN TIME: 24.5 SEC (ref 21.3–31.3)
PDW BLD-RTO: 14.1 % (ref 12.5–15.4)
PLATELET # BLD: 226 K/UL (ref 140–450)
PLATELET ESTIMATE: NORMAL
PMV BLD AUTO: 11.9 FL (ref 6–12)
POTASSIUM SERPL-SCNC: 4 MMOL/L (ref 3.7–5.3)
PROTHROMBIN TIME: 10.1 SEC (ref 9.4–12.6)
RBC # BLD: 4.64 M/UL (ref 4–5.2)
RBC # BLD: NORMAL 10*6/UL
SEG NEUTROPHILS: 64 %
SEGMENTED NEUTROPHILS ABSOLUTE COUNT: 4.4 K/UL (ref 1.8–7.7)
SODIUM BLD-SCNC: 134 MMOL/L (ref 135–144)
TROPONIN INTERP: NORMAL
TROPONIN T: <0.03 NG/ML
WBC # BLD: 6.8 K/UL (ref 3.5–11)
WBC # BLD: NORMAL 10*3/UL

## 2017-06-02 PROCEDURE — 6360000004 HC RX CONTRAST MEDICATION: Performed by: EMERGENCY MEDICINE

## 2017-06-02 PROCEDURE — 96374 THER/PROPH/DIAG INJ IV PUSH: CPT

## 2017-06-02 PROCEDURE — 2580000003 HC RX 258: Performed by: EMERGENCY MEDICINE

## 2017-06-02 PROCEDURE — 71020 XR CHEST STANDARD TWO VW: CPT

## 2017-06-02 PROCEDURE — 96375 TX/PRO/DX INJ NEW DRUG ADDON: CPT

## 2017-06-02 PROCEDURE — 84484 ASSAY OF TROPONIN QUANT: CPT

## 2017-06-02 PROCEDURE — 85610 PROTHROMBIN TIME: CPT

## 2017-06-02 PROCEDURE — 80048 BASIC METABOLIC PNL TOTAL CA: CPT

## 2017-06-02 PROCEDURE — 85025 COMPLETE CBC W/AUTO DIFF WBC: CPT

## 2017-06-02 PROCEDURE — 85730 THROMBOPLASTIN TIME PARTIAL: CPT

## 2017-06-02 PROCEDURE — 93005 ELECTROCARDIOGRAM TRACING: CPT

## 2017-06-02 PROCEDURE — 71260 CT THORAX DX C+: CPT

## 2017-06-02 PROCEDURE — 99285 EMERGENCY DEPT VISIT HI MDM: CPT

## 2017-06-02 PROCEDURE — 6360000002 HC RX W HCPCS: Performed by: EMERGENCY MEDICINE

## 2017-06-02 RX ORDER — 0.9 % SODIUM CHLORIDE 0.9 %
1000 INTRAVENOUS SOLUTION INTRAVENOUS ONCE
Status: COMPLETED | OUTPATIENT
Start: 2017-06-02 | End: 2017-06-02

## 2017-06-02 RX ORDER — ONDANSETRON 4 MG/1
4 TABLET, ORALLY DISINTEGRATING ORAL EVERY 8 HOURS PRN
Qty: 8 TABLET | Refills: 0 | Status: ON HOLD | OUTPATIENT
Start: 2017-06-02 | End: 2018-11-17

## 2017-06-02 RX ORDER — FENTANYL CITRATE 50 UG/ML
50 INJECTION, SOLUTION INTRAMUSCULAR; INTRAVENOUS ONCE
Status: COMPLETED | OUTPATIENT
Start: 2017-06-02 | End: 2017-06-02

## 2017-06-02 RX ORDER — ONDANSETRON 2 MG/ML
4 INJECTION INTRAMUSCULAR; INTRAVENOUS ONCE
Status: COMPLETED | OUTPATIENT
Start: 2017-06-02 | End: 2017-06-02

## 2017-06-02 RX ADMIN — IOVERSOL 85 ML: 741 INJECTION INTRA-ARTERIAL; INTRAVENOUS at 19:44

## 2017-06-02 RX ADMIN — ONDANSETRON 4 MG: 2 INJECTION, SOLUTION INTRAMUSCULAR; INTRAVENOUS at 18:02

## 2017-06-02 RX ADMIN — FENTANYL CITRATE 50 MCG: 50 INJECTION, SOLUTION INTRAMUSCULAR; INTRAVENOUS at 18:03

## 2017-06-02 RX ADMIN — SODIUM CHLORIDE 1000 ML: 9 INJECTION, SOLUTION INTRAVENOUS at 18:02

## 2017-06-02 RX ADMIN — HYDROMORPHONE HYDROCHLORIDE 1 MG: 1 INJECTION, SOLUTION INTRAMUSCULAR; INTRAVENOUS; SUBCUTANEOUS at 19:08

## 2017-06-02 ASSESSMENT — PAIN SCALES - GENERAL
PAINLEVEL_OUTOF10: 8

## 2017-06-02 ASSESSMENT — ENCOUNTER SYMPTOMS
ABDOMINAL PAIN: 0
CHEST TIGHTNESS: 0
DIARRHEA: 0
CONSTIPATION: 0
SHORTNESS OF BREATH: 1
PHOTOPHOBIA: 0
NAUSEA: 0
COUGH: 0
VOMITING: 0

## 2017-06-02 ASSESSMENT — PAIN DESCRIPTION - PAIN TYPE: TYPE: ACUTE PAIN

## 2017-06-02 ASSESSMENT — PAIN DESCRIPTION - LOCATION
LOCATION_2: CHEST
LOCATION: LEG

## 2017-06-02 ASSESSMENT — PAIN DESCRIPTION - ORIENTATION: ORIENTATION: RIGHT

## 2017-06-02 ASSESSMENT — PAIN DESCRIPTION - DESCRIPTORS
DESCRIPTORS_2: ACHING
DESCRIPTORS: ACHING

## 2017-06-02 ASSESSMENT — PAIN DESCRIPTION - INTENSITY: RATING_2: 5

## 2017-06-07 LAB
EKG ATRIAL RATE: 94 BPM
EKG P AXIS: 59 DEGREES
EKG P-R INTERVAL: 138 MS
EKG Q-T INTERVAL: 376 MS
EKG QRS DURATION: 90 MS
EKG QTC CALCULATION (BAZETT): 470 MS
EKG R AXIS: 34 DEGREES
EKG T AXIS: 10 DEGREES
EKG VENTRICULAR RATE: 94 BPM

## 2017-06-12 ENCOUNTER — HOSPITAL ENCOUNTER (EMERGENCY)
Age: 37
Discharge: HOME | End: 2017-06-12
Attending: EMERGENCY MEDICINE
Payer: MEDICARE

## 2017-06-12 ENCOUNTER — APPOINTMENT (OUTPATIENT)
Dept: GENERAL RADIOLOGY | Age: 37
End: 2017-06-12
Payer: MEDICARE

## 2017-06-12 ENCOUNTER — APPOINTMENT (OUTPATIENT)
Dept: CT IMAGING | Age: 37
End: 2017-06-12
Payer: MEDICARE

## 2017-06-12 VITALS
OXYGEN SATURATION: 99 % | RESPIRATION RATE: 20 BRPM | TEMPERATURE: 97.3 F | HEIGHT: 69 IN | BODY MASS INDEX: 43.4 KG/M2 | HEART RATE: 80 BPM | DIASTOLIC BLOOD PRESSURE: 137 MMHG | SYSTOLIC BLOOD PRESSURE: 153 MMHG | WEIGHT: 293 LBS

## 2017-06-12 DIAGNOSIS — R07.9 RIGHT-SIDED CHEST PAIN: Primary | ICD-10-CM

## 2017-06-12 LAB
ABSOLUTE EOS #: 0.2 K/UL (ref 0–0.4)
ABSOLUTE LYMPH #: 1.9 K/UL (ref 1–4.8)
ABSOLUTE MONO #: 0.5 K/UL (ref 0.1–1.2)
ALBUMIN SERPL-MCNC: 3.5 G/DL (ref 3.5–5.2)
ALBUMIN/GLOBULIN RATIO: 1.2 (ref 1–2.5)
ALP BLD-CCNC: 124 U/L (ref 35–104)
ALT SERPL-CCNC: 18 U/L (ref 5–33)
ANION GAP SERPL CALCULATED.3IONS-SCNC: 13 MMOL/L (ref 9–17)
AST SERPL-CCNC: 17 U/L
BASOPHILS # BLD: 1 %
BASOPHILS ABSOLUTE: 0.1 K/UL (ref 0–0.2)
BILIRUB SERPL-MCNC: 0.27 MG/DL (ref 0.3–1.2)
BILIRUBIN DIRECT: 0.11 MG/DL
BILIRUBIN, INDIRECT: 0.16 MG/DL (ref 0–1)
BUN BLDV-MCNC: 19 MG/DL (ref 6–20)
BUN/CREAT BLD: ABNORMAL (ref 9–20)
CALCIUM SERPL-MCNC: 9.1 MG/DL (ref 8.6–10.4)
CHLORIDE BLD-SCNC: 97 MMOL/L (ref 98–107)
CO2: 24 MMOL/L (ref 20–31)
CREAT SERPL-MCNC: 0.7 MG/DL (ref 0.5–0.9)
D-DIMER QUANTITATIVE: 0.18 MG/L FEU
DIFFERENTIAL TYPE: NORMAL
EOSINOPHILS RELATIVE PERCENT: 3 %
GFR AFRICAN AMERICAN: >60 ML/MIN
GFR NON-AFRICAN AMERICAN: >60 ML/MIN
GFR SERPL CREATININE-BSD FRML MDRD: ABNORMAL ML/MIN/{1.73_M2}
GFR SERPL CREATININE-BSD FRML MDRD: ABNORMAL ML/MIN/{1.73_M2}
GLOBULIN: ABNORMAL G/DL (ref 1.5–3.8)
GLUCOSE BLD-MCNC: 91 MG/DL (ref 70–99)
HCT VFR BLD CALC: 40.2 % (ref 36–46)
HEMOGLOBIN: 13.5 G/DL (ref 12–16)
INR BLD: 0.9
LIPASE: 14 U/L (ref 13–60)
LYMPHOCYTES # BLD: 30 %
MCH RBC QN AUTO: 30.3 PG (ref 26–34)
MCHC RBC AUTO-ENTMCNC: 33.7 G/DL (ref 31–37)
MCV RBC AUTO: 90 FL (ref 80–100)
MONOCYTES # BLD: 8 %
PARTIAL THROMBOPLASTIN TIME: 27.4 SEC (ref 21.3–31.3)
PDW BLD-RTO: 14.2 % (ref 12.5–15.4)
PLATELET # BLD: 220 K/UL (ref 140–450)
PLATELET ESTIMATE: NORMAL
PMV BLD AUTO: 11.8 FL (ref 6–12)
POC TROPONIN I: 0.01 NG/ML (ref 0–0.1)
POC TROPONIN I: 0.01 NG/ML (ref 0–0.1)
POC TROPONIN INTERP: NORMAL
POC TROPONIN INTERP: NORMAL
POTASSIUM SERPL-SCNC: 4.1 MMOL/L (ref 3.7–5.3)
PROTHROMBIN TIME: 10.1 SEC (ref 9.4–12.6)
RBC # BLD: 4.47 M/UL (ref 4–5.2)
RBC # BLD: NORMAL 10*6/UL
SEG NEUTROPHILS: 58 %
SEGMENTED NEUTROPHILS ABSOLUTE COUNT: 3.6 K/UL (ref 1.8–7.7)
SODIUM BLD-SCNC: 134 MMOL/L (ref 135–144)
TOTAL PROTEIN: 6.5 G/DL (ref 6.4–8.3)
WBC # BLD: 6.3 K/UL (ref 3.5–11)
WBC # BLD: NORMAL 10*3/UL

## 2017-06-12 PROCEDURE — 96374 THER/PROPH/DIAG INJ IV PUSH: CPT

## 2017-06-12 PROCEDURE — 80048 BASIC METABOLIC PNL TOTAL CA: CPT

## 2017-06-12 PROCEDURE — 99285 EMERGENCY DEPT VISIT HI MDM: CPT

## 2017-06-12 PROCEDURE — 96375 TX/PRO/DX INJ NEW DRUG ADDON: CPT

## 2017-06-12 PROCEDURE — 96376 TX/PRO/DX INJ SAME DRUG ADON: CPT

## 2017-06-12 PROCEDURE — 85610 PROTHROMBIN TIME: CPT

## 2017-06-12 PROCEDURE — 85730 THROMBOPLASTIN TIME PARTIAL: CPT

## 2017-06-12 PROCEDURE — 6360000004 HC RX CONTRAST MEDICATION: Performed by: EMERGENCY MEDICINE

## 2017-06-12 PROCEDURE — 71020 XR CHEST STANDARD TWO VW: CPT

## 2017-06-12 PROCEDURE — 71260 CT THORAX DX C+: CPT

## 2017-06-12 PROCEDURE — 85025 COMPLETE CBC W/AUTO DIFF WBC: CPT

## 2017-06-12 PROCEDURE — 83690 ASSAY OF LIPASE: CPT

## 2017-06-12 PROCEDURE — 80076 HEPATIC FUNCTION PANEL: CPT

## 2017-06-12 PROCEDURE — 70450 CT HEAD/BRAIN W/O DYE: CPT

## 2017-06-12 PROCEDURE — 6360000002 HC RX W HCPCS: Performed by: EMERGENCY MEDICINE

## 2017-06-12 PROCEDURE — 6370000000 HC RX 637 (ALT 250 FOR IP): Performed by: EMERGENCY MEDICINE

## 2017-06-12 PROCEDURE — 93005 ELECTROCARDIOGRAM TRACING: CPT

## 2017-06-12 PROCEDURE — 85379 FIBRIN DEGRADATION QUANT: CPT

## 2017-06-12 PROCEDURE — 84484 ASSAY OF TROPONIN QUANT: CPT

## 2017-06-12 RX ORDER — ONDANSETRON 2 MG/ML
4 INJECTION INTRAMUSCULAR; INTRAVENOUS ONCE
Status: COMPLETED | OUTPATIENT
Start: 2017-06-12 | End: 2017-06-12

## 2017-06-12 RX ORDER — ASPIRIN 81 MG/1
324 TABLET, CHEWABLE ORAL ONCE
Status: COMPLETED | OUTPATIENT
Start: 2017-06-12 | End: 2017-06-12

## 2017-06-12 RX ORDER — PROMETHAZINE HYDROCHLORIDE 25 MG/1
25 TABLET ORAL EVERY 6 HOURS PRN
Qty: 10 TABLET | Refills: 0 | Status: SHIPPED | OUTPATIENT
Start: 2017-06-12 | End: 2017-06-19

## 2017-06-12 RX ORDER — MORPHINE SULFATE 4 MG/ML
4 INJECTION, SOLUTION INTRAMUSCULAR; INTRAVENOUS ONCE
Status: COMPLETED | OUTPATIENT
Start: 2017-06-12 | End: 2017-06-12

## 2017-06-12 RX ORDER — SODIUM CHLORIDE 0.9 % (FLUSH) 0.9 %
10 SYRINGE (ML) INJECTION 2 TIMES DAILY
Status: DISCONTINUED | OUTPATIENT
Start: 2017-06-12 | End: 2017-06-12 | Stop reason: HOSPADM

## 2017-06-12 RX ADMIN — MORPHINE SULFATE 4 MG: 4 INJECTION, SOLUTION INTRAMUSCULAR; INTRAVENOUS at 03:40

## 2017-06-12 RX ADMIN — ONDANSETRON 4 MG: 2 INJECTION INTRAMUSCULAR; INTRAVENOUS at 03:40

## 2017-06-12 RX ADMIN — ASPIRIN 81 MG 324 MG: 81 TABLET ORAL at 03:15

## 2017-06-12 RX ADMIN — IOVERSOL 75 ML: 741 INJECTION INTRA-ARTERIAL; INTRAVENOUS at 04:24

## 2017-06-12 RX ADMIN — ONDANSETRON 4 MG: 2 INJECTION INTRAMUSCULAR; INTRAVENOUS at 05:29

## 2017-06-12 ASSESSMENT — ENCOUNTER SYMPTOMS
COUGH: 0
DIARRHEA: 0
BACK PAIN: 0
SHORTNESS OF BREATH: 0
NAUSEA: 0
CONSTIPATION: 0
VOMITING: 0
RHINORRHEA: 0
ABDOMINAL PAIN: 0

## 2017-06-12 ASSESSMENT — PAIN SCALES - GENERAL
PAINLEVEL_OUTOF10: 5
PAINLEVEL_OUTOF10: 8
PAINLEVEL_OUTOF10: 8

## 2017-06-12 ASSESSMENT — PAIN DESCRIPTION - PAIN TYPE
TYPE: ACUTE PAIN
TYPE: ACUTE PAIN

## 2017-06-12 ASSESSMENT — PAIN DESCRIPTION - LOCATION
LOCATION: CHEST
LOCATION: CHEST

## 2017-06-14 LAB
EKG ATRIAL RATE: 85 BPM
EKG P AXIS: 32 DEGREES
EKG P-R INTERVAL: 136 MS
EKG Q-T INTERVAL: 388 MS
EKG QRS DURATION: 92 MS
EKG QTC CALCULATION (BAZETT): 461 MS
EKG R AXIS: 38 DEGREES
EKG T AXIS: 21 DEGREES
EKG VENTRICULAR RATE: 85 BPM

## 2017-07-02 ENCOUNTER — APPOINTMENT (OUTPATIENT)
Dept: GENERAL RADIOLOGY | Age: 37
End: 2017-07-02
Payer: MEDICARE

## 2017-07-02 ENCOUNTER — APPOINTMENT (OUTPATIENT)
Dept: CT IMAGING | Age: 37
End: 2017-07-02
Payer: MEDICARE

## 2017-07-02 ENCOUNTER — HOSPITAL ENCOUNTER (EMERGENCY)
Age: 37
Discharge: HOME | End: 2017-07-02
Attending: EMERGENCY MEDICINE
Payer: MEDICARE

## 2017-07-02 VITALS
HEART RATE: 96 BPM | BODY MASS INDEX: 44.41 KG/M2 | TEMPERATURE: 97.3 F | WEIGHT: 293 LBS | DIASTOLIC BLOOD PRESSURE: 91 MMHG | HEIGHT: 68 IN | RESPIRATION RATE: 16 BRPM | OXYGEN SATURATION: 100 % | SYSTOLIC BLOOD PRESSURE: 144 MMHG

## 2017-07-02 VITALS
OXYGEN SATURATION: 100 % | HEART RATE: 78 BPM | TEMPERATURE: 97.2 F | SYSTOLIC BLOOD PRESSURE: 139 MMHG | DIASTOLIC BLOOD PRESSURE: 96 MMHG | RESPIRATION RATE: 18 BRPM

## 2017-07-02 DIAGNOSIS — S43.402S SPRAIN OF LEFT SHOULDER, UNSPECIFIED SHOULDER SPRAIN TYPE, SEQUELA: ICD-10-CM

## 2017-07-02 DIAGNOSIS — S13.9XXA SPRAIN OF NECK, INITIAL ENCOUNTER: Primary | ICD-10-CM

## 2017-07-02 DIAGNOSIS — S13.4XXD WHIPLASH INJURIES, SUBSEQUENT ENCOUNTER: ICD-10-CM
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
## 2017-07-02 LAB
ABSOLUTE EOS #: 0.2 K/UL (ref 0–0.4)
ABSOLUTE LYMPH #: 1.9 K/UL (ref 1–4.8)
ABSOLUTE MONO #: 0.4 K/UL (ref 0.1–1.2)
ALBUMIN SERPL-MCNC: 3.3 G/DL (ref 3.5–5.2)
ALBUMIN/GLOBULIN RATIO: 1 (ref 1–2.5)
ALP BLD-CCNC: 116 U/L (ref 35–104)
ALT SERPL-CCNC: 13 U/L (ref 5–33)
AMYLASE: 26 U/L (ref 28–100)
ANION GAP SERPL CALCULATED.3IONS-SCNC: 13 MMOL/L (ref 9–17)
AST SERPL-CCNC: 18 U/L
BASOPHILS # BLD: 1 %
BASOPHILS ABSOLUTE: 0 K/UL (ref 0–0.2)
BILIRUB SERPL-MCNC: 0.28 MG/DL (ref 0.3–1.2)
BILIRUBIN DIRECT: <0.08 MG/DL
BILIRUBIN, INDIRECT: ABNORMAL MG/DL (ref 0–1)
BUN BLDV-MCNC: 11 MG/DL (ref 6–20)
BUN/CREAT BLD: ABNORMAL (ref 9–20)
CALCIUM SERPL-MCNC: 9.2 MG/DL (ref 8.6–10.4)
CHLORIDE BLD-SCNC: 99 MMOL/L (ref 98–107)
CO2: 26 MMOL/L (ref 20–31)
CREAT SERPL-MCNC: 0.54 MG/DL (ref 0.5–0.9)
DIFFERENTIAL TYPE: NORMAL
EOSINOPHILS RELATIVE PERCENT: 4 %
GFR AFRICAN AMERICAN: >60 ML/MIN
GFR NON-AFRICAN AMERICAN: >60 ML/MIN
GFR SERPL CREATININE-BSD FRML MDRD: ABNORMAL ML/MIN/{1.73_M2}
GFR SERPL CREATININE-BSD FRML MDRD: ABNORMAL ML/MIN/{1.73_M2}
GLOBULIN: ABNORMAL G/DL (ref 1.5–3.8)
GLUCOSE BLD-MCNC: 309 MG/DL (ref 70–99)
HCT VFR BLD CALC: 39.5 % (ref 36–46)
HEMOGLOBIN: 13.2 G/DL (ref 12–16)
INR BLD: 0.9
LIPASE: 17 U/L (ref 13–60)
LYMPHOCYTES # BLD: 39 %
MCH RBC QN AUTO: 30.4 PG (ref 26–34)
MCHC RBC AUTO-ENTMCNC: 33.4 G/DL (ref 31–37)
MCV RBC AUTO: 91 FL (ref 80–100)
MONOCYTES # BLD: 9 %
PDW BLD-RTO: 14.6 % (ref 12.5–15.4)
PLATELET # BLD: 211 K/UL (ref 140–450)
PLATELET ESTIMATE: NORMAL
PMV BLD AUTO: 11.9 FL (ref 6–12)
POTASSIUM SERPL-SCNC: 4.5 MMOL/L (ref 3.7–5.3)
PROTHROMBIN TIME: 10.2 SEC (ref 9.4–12.6)
RBC # BLD: 4.34 M/UL (ref 4–5.2)
RBC # BLD: NORMAL 10*6/UL
SEG NEUTROPHILS: 47 %
SEGMENTED NEUTROPHILS ABSOLUTE COUNT: 2.2 K/UL (ref 1.8–7.7)
SODIUM BLD-SCNC: 138 MMOL/L (ref 135–144)
TOTAL PROTEIN: 6.7 G/DL (ref 6.4–8.3)
WBC # BLD: 4.7 K/UL (ref 3.5–11)
WBC # BLD: NORMAL 10*3/UL

## 2017-07-02 PROCEDURE — 96374 THER/PROPH/DIAG INJ IV PUSH: CPT

## 2017-07-02 PROCEDURE — 82150 ASSAY OF AMYLASE: CPT

## 2017-07-02 PROCEDURE — 80076 HEPATIC FUNCTION PANEL: CPT

## 2017-07-02 PROCEDURE — 73502 X-RAY EXAM HIP UNI 2-3 VIEWS: CPT

## 2017-07-02 PROCEDURE — G0383 LEV 4 HOSP TYPE B ED VISIT: HCPCS

## 2017-07-02 PROCEDURE — 83690 ASSAY OF LIPASE: CPT

## 2017-07-02 PROCEDURE — 71010 XR CHEST PORTABLE: CPT

## 2017-07-02 PROCEDURE — 74177 CT ABD & PELVIS W/CONTRAST: CPT

## 2017-07-02 PROCEDURE — 99284 EMERGENCY DEPT VISIT MOD MDM: CPT

## 2017-07-02 PROCEDURE — 80048 BASIC METABOLIC PNL TOTAL CA: CPT

## 2017-07-02 PROCEDURE — 6360000002 HC RX W HCPCS: Performed by: EMERGENCY MEDICINE

## 2017-07-02 PROCEDURE — 6370000000 HC RX 637 (ALT 250 FOR IP): Performed by: EMERGENCY MEDICINE

## 2017-07-02 PROCEDURE — 73030 X-RAY EXAM OF SHOULDER: CPT

## 2017-07-02 PROCEDURE — 72125 CT NECK SPINE W/O DYE: CPT

## 2017-07-02 PROCEDURE — 85025 COMPLETE CBC W/AUTO DIFF WBC: CPT

## 2017-07-02 PROCEDURE — 85610 PROTHROMBIN TIME: CPT

## 2017-07-02 PROCEDURE — 6360000004 HC RX CONTRAST MEDICATION: Performed by: EMERGENCY MEDICINE

## 2017-07-02 PROCEDURE — 72040 X-RAY EXAM NECK SPINE 2-3 VW: CPT

## 2017-07-02 RX ORDER — ORPHENADRINE CITRATE 30 MG/ML
60 INJECTION INTRAMUSCULAR; INTRAVENOUS ONCE
Status: COMPLETED | OUTPATIENT
Start: 2017-07-02 | End: 2017-07-02

## 2017-07-02 RX ORDER — OXYCODONE HYDROCHLORIDE AND ACETAMINOPHEN 5; 325 MG/1; MG/1
1-2 TABLET ORAL EVERY 6 HOURS PRN
Qty: 10 TABLET | Refills: 0 | Status: SHIPPED | OUTPATIENT
Start: 2017-07-02 | End: 2017-07-09

## 2017-07-02 RX ORDER — IBUPROFEN 800 MG/1
800 TABLET ORAL EVERY 8 HOURS PRN
Qty: 30 TABLET | Refills: 0 | Status: SHIPPED | OUTPATIENT
Start: 2017-07-02 | End: 2017-11-01 | Stop reason: ALTCHOICE

## 2017-07-02 RX ORDER — ACETAMINOPHEN 325 MG/1
650 TABLET ORAL EVERY 6 HOURS PRN
Qty: 120 TABLET | Refills: 3 | Status: SHIPPED | OUTPATIENT
Start: 2017-07-02 | End: 2017-09-22

## 2017-07-02 RX ORDER — OXYCODONE HYDROCHLORIDE AND ACETAMINOPHEN 5; 325 MG/1; MG/1
2 TABLET ORAL ONCE
Status: COMPLETED | OUTPATIENT
Start: 2017-07-02 | End: 2017-07-02

## 2017-07-02 RX ORDER — ACETAMINOPHEN AND CODEINE PHOSPHATE 300; 30 MG/1; MG/1
1 TABLET ORAL ONCE
Status: COMPLETED | OUTPATIENT
Start: 2017-07-02 | End: 2017-07-02

## 2017-07-02 RX ORDER — TIZANIDINE 4 MG/1
4 TABLET ORAL EVERY 6 HOURS PRN
Qty: 40 TABLET | Refills: 0 | Status: SHIPPED | OUTPATIENT
Start: 2017-07-02 | End: 2020-06-26

## 2017-07-02 RX ADMIN — IOVERSOL 130 ML: 741 INJECTION INTRA-ARTERIAL; INTRAVENOUS at 16:31

## 2017-07-02 RX ADMIN — OXYCODONE HYDROCHLORIDE AND ACETAMINOPHEN 2 TABLET: 5; 325 TABLET ORAL at 05:10

## 2017-07-02 RX ADMIN — ACETAMINOPHEN AND CODEINE PHOSPHATE 1 TABLET: 30; 300 TABLET ORAL at 18:36

## 2017-07-02 RX ADMIN — ORPHENADRINE CITRATE 60 MG: 30 INJECTION INTRAMUSCULAR; INTRAVENOUS at 15:36

## 2017-07-02 ASSESSMENT — ENCOUNTER SYMPTOMS
SHORTNESS OF BREATH: 0
BLOOD IN STOOL: 0
DIARRHEA: 0
CONSTIPATION: 0
BACK PAIN: 0
RHINORRHEA: 0
VOMITING: 1
ABDOMINAL PAIN: 1
SHORTNESS OF BREATH: 0
DIARRHEA: 0
ABDOMINAL PAIN: 0
NAUSEA: 0
COUGH: 0
VOMITING: 0
WHEEZING: 0
BACK PAIN: 0
NAUSEA: 1
COUGH: 0
ORTHOPNEA: 0

## 2017-07-02 ASSESSMENT — PAIN DESCRIPTION - DESCRIPTORS: DESCRIPTORS: DULL;SHARP

## 2017-07-02 ASSESSMENT — PAIN SCALES - GENERAL
PAINLEVEL_OUTOF10: 10
PAINLEVEL_OUTOF10: 10
PAINLEVEL_OUTOF10: 6
PAINLEVEL_OUTOF10: 8
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
## 2017-07-02 NOTE — ED AVS SNAPSHOT
After Visit Summary  (Discharge Instructions)    Medication List for Home    Based on the information you provided to us as well as any changes during this visit, the following is your updated medication list.  Compare this with your prescription bottles at home. If you have any questions or concerns, contact your primary care physician's office. Daily Medication List (This medication list can be shared with any Healthcare provider who is helping you manage your medications)      There are NEW medications for you. START taking them after you leave the hospital     oxyCODONE-acetaminophen 5-325 MG per tablet   Commonly known as:  PERCOCET   Take 1-2 tablets by mouth every 6 hours as needed for Pain  WARNING:  May cause drowsiness. May impair ability to operate vehicles or machinery. Do not use in combination with alcohol. Nidia Knowles your doctor about these medications if you have questions     bumetanide 2 MG tablet   Commonly known as:  BUMEX   Take 1 tablet by mouth daily       diltiazem 240 MG extended release capsule   Commonly known as:  CARDIZEM CD   Take 1 capsule by mouth daily       * DULoxetine HCl 40 MG Cpep   Take 40 mg by mouth daily Do not crush or break. May add contents of capsule to apple juice or apple sauce, but not chocolate.        * DULoxetine 60 MG extended release capsule   Commonly known as:  CYMBALTA   Take 1 capsule by mouth daily       esomeprazole Magnesium 40 MG Pack   Commonly known as:  NEXIUM   Take 40 mg by mouth daily       fondaparinux 10 MG/0.8ML Soln injection   Commonly known as:  ARIXTRA   INJECT 0.8MLS INTO THE SKIN DAILY       glucose monitoring kit monitoring kit   1 kit by Does not apply route daily as needed       insulin aspart 100 UNIT/ML injection pen   Commonly known as:  NOVOLOG FLEXPEN   Inject 5 Units into the skin 3 times daily (before meals) Hold if sugar below 100       insulin glargine 100 UNIT/ML injection vial Commonly known as:  LANTUS   Inject 30 Units into the skin nightly       Insulin Pen Needle 31G X 6 MM Misc   1 each by Does not apply route 2 times daily       Lancets Misc   1 each by Other route 3 times daily       lidocaine 5 % ointment   Commonly known as:  XYLOCAINE   Apply topically as needed. lisinopril 5 MG tablet   Commonly known as:  PRINIVIL;ZESTRIL   Take 1 tablet by mouth Daily with lunch       * LORazepam 1 MG tablet   Commonly known as:  ATIVAN   Take 1 mg by mouth 2 times daily as needed for Anxiety       * LORazepam 2 MG tablet   Commonly known as:  ATIVAN   Take 2 mg by mouth nightly as needed for Anxiety       magnesium oxide 400 (241.3 Mg) MG Tabs tablet   Commonly known as:  MAG-OX   Take 1 tablet by mouth 2 times daily       Menthol (Topical Analgesic) 5 % Pads   Apply to the chest wall, as needed for pain, daily. ondansetron 4 MG disintegrating tablet   Commonly known as:  ZOFRAN ODT   Take 1 tablet by mouth every 8 hours as needed for Nausea       silver sulfADIAZINE 1 % cream   Commonly known as:  SILVADENE   Apply topically daily. vitamin D 1000 UNIT Tabs tablet   Commonly known as:  CHOLECALCIFEROL   Take 10,000 Units by mouth daily None for about 1 week, states not given while in hospital       zolpidem 5 MG tablet   Commonly known as:  AMBIEN   Take 5 mg by mouth nightly as needed for Sleep       * Notice: This list has 4 medication(s) that are the same as other medications prescribed for you. Read the directions carefully, and ask your doctor or other care provider to review them with you.          Where to Get Your Medications      You can get these medications from any pharmacy     Bring a paper prescription for each of these medications     oxyCODONE-acetaminophen 5-325 MG per tablet               Allergies as of 7/2/2017        Reactions    Betadine [Povidone Iodine]     Celexa [Citalopram Hydrobromide]     Lasix [Furosemide] Macrobid [Nitrofurantoin Monohyd Macro]     Macrobid [Nitrofurantoin]     Other     Paper tape    Betadine [Povidone Iodine] Rash    Lithium Nausea And Vomiting    Paxil [Paroxetine Hcl] Rash    Tegretol [Carbamazepine] Rash      Immunizations as of 2017     Name Date Dose VIS Date Route    Influenza Virus Vaccine 10/7/2015 0.5 mL 2015 Intramuscular    Influenza Virus Vaccine 10/1/2014 -- -- --    Pneumococcal 13-valent Conjugate (Dtalfbi19) 10/7/2015 0.5 mL 2013 Intramuscular    Td 2015 0.5 mL 2014 Intramuscular         After Visit Summary    This summary was created for you. Thank you for entrusting your care to us. The following information includes details about your hospital/visit stay along with steps you should take to help with your recovery once you leave the hospital.  In this packet, you will find information about the topics listed below:    · Instructions about your medications including a list of your home medications  · A summary of your hospital visit  · Follow-up appointments once you have left the hospital  · Your care plan at home      You may receive a survey regarding the care you received during your stay. Your input is valuable to us. We encourage you to complete and return your survey in the envelope provided. We hope you will choose us in the future for your healthcare needs. Patient Information     Patient Name KATY Meyers 1980      Care Provided at:     Name Address Phone       [address] [facility] [address] [facility] [phone]            Your Visit    Here you will find information about your visit, including the reason for your visit. Please take this sheet with you when you visit your doctor or other health care provider in the future. It will help determine the best possible medical care for you at that time.   If you have any questions have a clinical question, please call your doctor's office. The information on all pages of the After Visit Summary has been reviewed with me, the patient and/or responsible adult, by my health care provider(s). I had the opportunity to ask questions regarding this information. I understand I should dispose of my armband safely at home to protect my health information. A complete copy of the After Visit Summary has been given to me, the patient and/or responsible adult. Patient Signature/Responsible Adult: ___________________________________    Nurse Signature: ___________________________________  Resident/MLP Signature: ___________________________________  Attending Signature: ___________________________________    Date:____________Time:____________              Discharge Instructions            Neck Strain: Care Instructions  Your Care Instructions  You have strained the muscles and ligaments in your neck. A sudden, awkward movement can strain the neck. This often occurs with falls or car accidents or during certain sports. Everyday activities like working on a computer or sleeping can also cause neck strain if they force you to hold your neck in an awkward position for a long time. It is common for neck pain to get worse for a day or two after an injury, but it should start to feel better after that. You may have more pain and stiffness for several days before it gets better. This is expected. It may take a few weeks or longer for it to heal completely. Good home treatment can help you get better faster and avoid future neck problems. Follow-up care is a key part of your treatment and safety. Be sure to make and go to all appointments, and call your doctor if you are having problems. It's also a good idea to know your test results and keep a list of the medicines you take. How can you care for yourself at home?   · If you were given a neck brace (cervical collar) to limit neck motion, wear it as instructed for as many days as your doctor tells you to. Do not wear it longer than you were told to. Wearing a brace for too long can make neck stiffness worse and weaken the neck muscles. · You can try using heat or ice to see if it helps. ¨ Try using a heating pad on a low or medium setting for 15 to 20 minutes every 2 to 3 hours. Try a warm shower in place of one session with the heating pad. You can also buy single-use heat wraps that last up to 8 hours. ¨ You can also try an ice pack for 10 to 15 minutes every 2 to 3 hours. · Take pain medicines exactly as directed. ¨ If the doctor gave you a prescription medicine for pain, take it as prescribed. ¨ If you are not taking a prescription pain medicine, ask your doctor if you can take an over-the-counter medicine. · Gently rub the area to relieve pain and help with blood flow. Do not massage the area if it hurts to do so. · Do not do anything that makes the pain worse. Take it easy for a couple of days. You can do your usual activities if they do not hurt your neck or put it at risk for more stress or injury. · Try sleeping on a special neck pillow. Place it under your neck, not under your head. Placing a tightly rolled-up towel under your neck while you sleep will also work. If you use a neck pillow or rolled towel, do not use your regular pillow at the same time. · To prevent future neck pain, do exercises to stretch and strengthen your neck and back. Learn how to use good posture, safe lifting techniques, and proper body mechanics. When should you call for help? Call 911 anytime you think you may need emergency care. For example, call if:  · You are unable to move an arm or a leg at all. Call your doctor now or seek immediate medical care if:  · You have new or worse symptoms in your arms, legs, chest, belly, or buttocks. Symptoms may include:  ¨ Numbness or tingling. ¨ Weakness. ¨ Pain. · You lose bladder or bowel control. Watch closely for changes in your health, and be sure to contact your doctor if:  · You are not getting better as expected. Where can you learn more? Go to https://[employer]seb[employer]Sova. org and sign in to your [employer] account. Enter M253 in the [employer] box to learn more about \"Neck Strain: Care Instructions. \"     If you do not have an account, please click on the \"Sign Up Now\" link. Current as of: May 23, 2016  Content Version: 11.2  © 5577-3504 [employer]. Care instructions adapted under license by [facility] ([facility]). If you have questions about a medical condition or this instruction, always ask your healthcare professional. [address] any warranty or liability for your use of this information.

## 2017-07-02 NOTE — ED AVS SNAPSHOT
your doctor or other care provider to review them with you. Where to Get Your Medications      You can get these medications from any pharmacy     Bring a paper prescription for each of these medications     acetaminophen 325 MG tablet    ibuprofen 800 MG tablet    tiZANidine 4 MG tablet               Allergies as of 2017        Reactions    Betadine [Povidone Iodine]     Celexa [Citalopram Hydrobromide]     Lasix [Furosemide]     Macrobid [Nitrofurantoin Monohyd Macro]     Macrobid [Nitrofurantoin]     Other     Paper tape    Betadine [Povidone Iodine] Rash    Lithium Nausea And Vomiting    Paxil [Paroxetine Hcl] Rash    Tegretol [Carbamazepine] Rash      Immunizations as of 2017     Name Date Dose VIS Date Route    Influenza Virus Vaccine 10/7/2015 0.5 mL 2015 Intramuscular    Influenza Virus Vaccine 10/1/2014 -- -- --    Pneumococcal 13-valent Conjugate (Wmfyufs74) 10/7/2015 0.5 mL 2013 Intramuscular    Td 2015 0.5 mL 2014 Intramuscular         After Visit Summary    This summary was created for you. Thank you for entrusting your care to us. The following information includes details about your hospital/visit stay along with steps you should take to help with your recovery once you leave the hospital.  In this packet, you will find information about the topics listed below:    · Instructions about your medications including a list of your home medications  · A summary of your hospital visit  · Follow-up appointments once you have left the hospital  · Your care plan at home      You may receive a survey regarding the care you received during your stay. Your input is valuable to us. We encourage you to complete and return your survey in the envelope provided. We hope you will choose us in the future for your healthcare needs.           Patient Information     Patient Name KATY Butleria Leona 1980      Care Provided at:     Name Address Phone can strain the neck. Follow-up care is a key part of your treatment and safety. Be sure to make and go to all appointments, and call your doctor if you are having problems. It's also a good idea to know your test results and keep a list of the medicines you take. How can you care for yourself at home? · Take pain medicines exactly as directed. ¨ If the doctor gave you a prescription medicine for pain, take it as prescribed. ¨ If you are not taking a prescription pain medicine, ask your doctor if you can take an over-the-counter medicine. ¨ Do not take two or more pain medicines at the same time unless the doctor told you to. Many pain medicines have acetaminophen, which is Tylenol. Too much acetaminophen (Tylenol) can be harmful. · You can try using a soft foam collar to support your neck for short periods of time. You can buy one at most [employer]. Do not wear the collar more than 2 or 3 days unless your doctor tells you to. · You can try using heat and ice to see if it helps. ¨ Try using a heating pad on a low or medium setting for 15 to 20 minutes every 2 to 3 hours. Try a warm shower in place of one session with the heating pad. You can also buy single-use heat wraps that last up to 8 hours. ¨ You can also try an ice pack for 10 to 15 minutes every 2 to 3 hours. · Do not do anything that makes the pain worse. Take it easy for a couple of days. You can do your usual activities if they do not hurt your neck or put it at risk for more stress or injury. Avoid lifting, sports, or other activities that might strain your neck. · Try sleeping on a special neck pillow. Place it under your neck, not under your head. Placing a tightly rolled-up towel under your neck while you sleep will also work. If you use a neck pillow or rolled towel, do not use your regular pillow at the same time.   · Once your neck pain is gone, do exercises to stretch your neck and back
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
## 2017-07-02 NOTE — ED PROVIDER NOTES
(methicillin resistant staph aureus) culture positive; Overdose of drug; Pernicious anemia; Primary hypercoagulable state ([facility] [address] Suicide attempt [facility]); SVT (supraventricular tachycardia) ([facility] and Type II or unspecified type diabetes mellitus without mention of complication, not stated as uncontrolled. 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 Social History:  reports that she has never smoked. She has never used smokeless tobacco. She reports that she does not drink alcohol or use illicit drugs. Family History: Noncontributory at this time  Psychiatric History: Noncontributory at this time    Allergies:is allergic to betadine [povidone iodine]; celexa [citalopram hydrobromide]; lasix [furosemide]; macrobid [nitrofurantoin monohyd macro]; macrobid [nitrofurantoin]; other; betadine [povidone iodine]; lithium; paxil [paroxetine hcl]; and tegretol [carbamazepine]. PHYSICAL EXAM     INITIAL VITALS: BP (!) 139/96  Pulse 78  Temp 97.2 °F (36.2 °C)  Resp 18  SpO2 100%     Physical Exam   Constitutional: She is oriented to person, place, and time. She appears well-developed and well-nourished. No distress. HENT:   Head: Normocephalic. Mouth/Throat: No oropharyngeal exudate. Eyes: Pupils are equal, round, and reactive to light. Right eye exhibits no discharge. Left eye exhibits no discharge. No scleral icterus. Neck: Normal range of motion. Muscular tenderness present. No tracheal deviation, no edema, no erythema and normal range of motion present. No Brudzinski's sign and no Kernig's sign noted.        Cardiovascular: Normal rate, regular rhythm, normal heart sounds and intact distal pulses. Exam reveals no gallop and no friction rub. No murmur heard. Pulmonary/Chest: Effort normal and breath sounds normal. No stridor. No respiratory distress. She has no wheezes. She has no rales. She exhibits no tenderness. Abdominal: Soft. Bowel sounds are normal. She exhibits no distension and no mass. There is no tenderness. There is no rebound and no guarding. Musculoskeletal: Normal range of motion. Right shoulder: She exhibits pain and spasm. She exhibits normal range of motion, no tenderness, no bony tenderness, no swelling, no effusion, no crepitus, no deformity, no laceration, normal pulse and normal strength. Right hip: She exhibits tenderness. She exhibits normal range of motion, normal strength, no bony tenderness, no swelling and no crepitus. Cervical back: She exhibits pain and spasm. She exhibits normal range of motion, no tenderness, no bony tenderness, no swelling, no edema, no deformity, no laceration and normal pulse. Thoracic back: She exhibits normal range of motion, no tenderness, no bony tenderness, no swelling, no edema, no deformity, no laceration, no pain, no spasm and normal pulse. Lumbar back: She exhibits normal range of motion, no tenderness, no bony tenderness, no swelling, no edema, no deformity, no laceration, no pain, no spasm and normal pulse. Neurological: She is alert and oriented to person, place, and time. She has normal reflexes. She exhibits normal muscle tone. Coordination normal.   Skin: Skin is warm and dry. No rash noted. She is not diaphoretic. No erythema. No pallor. Psychiatric: She expresses no homicidal and no suicidal ideation. EMERGENCY DEPARTMENT COURSE:     LABS: Labs reviewed by myself show:   Labs Reviewed - No data to display       EKG:  All EKG's are interpreted by the Emergency Department Physician who either signs or Co-signs this chart in the absence of a cardiologist.      RADIOLOGY:  I

## 2017-07-04 ENCOUNTER — HOSPITAL ENCOUNTER (EMERGENCY)
Age: 37
Discharge: HOME | End: 2017-07-04
Attending: EMERGENCY MEDICINE
Payer: MEDICARE

## 2017-07-04 VITALS
WEIGHT: 293 LBS | BODY MASS INDEX: 44.41 KG/M2 | RESPIRATION RATE: 18 BRPM | HEIGHT: 68 IN | SYSTOLIC BLOOD PRESSURE: 137 MMHG | OXYGEN SATURATION: 98 % | DIASTOLIC BLOOD PRESSURE: 81 MMHG | HEART RATE: 88 BPM | TEMPERATURE: 98.2 F

## 2017-07-04 VITALS
WEIGHT: 293 LBS | HEIGHT: 68 IN | RESPIRATION RATE: 16 BRPM | TEMPERATURE: 98.4 F | HEART RATE: 94 BPM | SYSTOLIC BLOOD PRESSURE: 148 MMHG | OXYGEN SATURATION: 97 % | DIASTOLIC BLOOD PRESSURE: 99 MMHG | BODY MASS INDEX: 44.41 KG/M2

## 2017-07-04 DIAGNOSIS — S46.811D TRAPEZIUS STRAIN, RIGHT, SUBSEQUENT ENCOUNTER: ICD-10-CM

## 2017-07-04 DIAGNOSIS — S13.4XXS WHIPLASH INJURIES, SEQUELA: Primary | ICD-10-CM

## 2017-07-04 DIAGNOSIS — M25.551 HIP PAIN, ACUTE, RIGHT: ICD-10-CM

## 2017-07-04 DIAGNOSIS — S60.419A FINGER ABRASION, INITIAL ENCOUNTER: Primary | ICD-10-CM
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
## 2017-07-04 PROCEDURE — 99283 EMERGENCY DEPT VISIT LOW MDM: CPT

## 2017-07-04 ASSESSMENT — ENCOUNTER SYMPTOMS
GASTROINTESTINAL NEGATIVE: 1
ALLERGIC/IMMUNOLOGIC NEGATIVE: 1
STRIDOR: 0
EYES NEGATIVE: 1
COUGH: 0
RESPIRATORY NEGATIVE: 1
SHORTNESS OF BREATH: 0

## 2017-07-04 ASSESSMENT — PAIN SCALES - GENERAL
PAINLEVEL_OUTOF10: 9
PAINLEVEL_OUTOF10: 8

## 2017-07-04 ASSESSMENT — PAIN DESCRIPTION - ORIENTATION
ORIENTATION: LEFT
ORIENTATION: RIGHT

## 2017-07-04 ASSESSMENT — PAIN DESCRIPTION - DESCRIPTORS
DESCRIPTORS: SHARP
DESCRIPTORS: ACHING

## 2017-07-04 ASSESSMENT — PAIN DESCRIPTION - LOCATION
LOCATION: FINGER (COMMENT WHICH ONE)
LOCATION: BACK;NECK;SHOULDER

## 2017-07-04 ASSESSMENT — PAIN DESCRIPTION - PAIN TYPE
TYPE: ACUTE PAIN
TYPE: ACUTE PAIN

## 2017-07-04 ASSESSMENT — PAIN DESCRIPTION - PROGRESSION: CLINICAL_PROGRESSION: GRADUALLY WORSENING

## 2017-07-04 ASSESSMENT — PAIN DESCRIPTION - FREQUENCY: FREQUENCY: CONTINUOUS

## 2017-07-09 ENCOUNTER — HOSPITAL ENCOUNTER (EMERGENCY)
Age: 37
Discharge: HOME | End: 2017-07-09
Attending: EMERGENCY MEDICINE
Payer: MEDICARE

## 2017-07-09 VITALS
WEIGHT: 293 LBS | RESPIRATION RATE: 18 BRPM | SYSTOLIC BLOOD PRESSURE: 152 MMHG | OXYGEN SATURATION: 100 % | BODY MASS INDEX: 43.4 KG/M2 | HEIGHT: 69 IN | HEART RATE: 77 BPM | DIASTOLIC BLOOD PRESSURE: 101 MMHG | TEMPERATURE: 97.2 F

## 2017-07-09 DIAGNOSIS — F13.930 BENZODIAZEPINE WITHDRAWAL, UNCOMPLICATED (HCC): Primary | ICD-10-CM

## 2017-07-09 PROCEDURE — G0382 LEV 3 HOSP TYPE B ED VISIT: HCPCS

## 2017-07-09 PROCEDURE — 6360000002 HC RX W HCPCS: Performed by: EMERGENCY MEDICINE

## 2017-07-09 PROCEDURE — 6370000000 HC RX 637 (ALT 250 FOR IP): Performed by: EMERGENCY MEDICINE

## 2017-07-09 RX ORDER — LORAZEPAM 2 MG/1
2 TABLET ORAL 2 TIMES DAILY PRN
Qty: 14 TABLET | Refills: 0 | Status: SHIPPED | OUTPATIENT
Start: 2017-07-09 | End: 2017-07-16

## 2017-07-09 RX ORDER — PROMETHAZINE HYDROCHLORIDE 25 MG/1
25 TABLET ORAL EVERY 8 HOURS PRN
Qty: 10 TABLET | Refills: 0 | Status: SHIPPED | OUTPATIENT
Start: 2017-07-09 | End: 2017-09-22

## 2017-07-09 RX ORDER — LORAZEPAM 0.5 MG/1
2 TABLET ORAL ONCE
Status: COMPLETED | OUTPATIENT
Start: 2017-07-09 | End: 2017-07-09

## 2017-07-09 RX ORDER — PROMETHAZINE HYDROCHLORIDE 25 MG/1
25 TABLET ORAL ONCE
Status: COMPLETED | OUTPATIENT
Start: 2017-07-09 | End: 2017-07-09

## 2017-07-09 RX ADMIN — PROMETHAZINE HYDROCHLORIDE 25 MG: 25 TABLET ORAL at 17:48

## 2017-07-09 RX ADMIN — LORAZEPAM 2 MG: 0.5 TABLET ORAL at 17:48

## 2017-07-09 ASSESSMENT — ENCOUNTER SYMPTOMS
BLOOD IN STOOL: 0
DIARRHEA: 0
SHORTNESS OF BREATH: 0
VOMITING: 1
NAUSEA: 1
RHINORRHEA: 0
CONSTIPATION: 0
BACK PAIN: 0
CHEST TIGHTNESS: 0
SORE THROAT: 0
ABDOMINAL PAIN: 1

## 2017-07-20 ASSESSMENT — ENCOUNTER SYMPTOMS
CHEST TIGHTNESS: 0
ABDOMINAL PAIN: 0

## 2017-08-06 ENCOUNTER — HOSPITAL ENCOUNTER (EMERGENCY)
Age: 37
Discharge: HOME | End: 2017-08-07
Attending: EMERGENCY MEDICINE
Payer: MEDICARE

## 2017-08-06 ENCOUNTER — APPOINTMENT (OUTPATIENT)
Dept: CT IMAGING | Age: 37
End: 2017-08-06
Payer: MEDICARE

## 2017-08-06 ENCOUNTER — APPOINTMENT (OUTPATIENT)
Dept: GENERAL RADIOLOGY | Age: 37
End: 2017-08-06
Payer: MEDICARE

## 2017-08-06 DIAGNOSIS — R07.9 CHEST PAIN, UNSPECIFIED TYPE: ICD-10-CM

## 2017-08-06 DIAGNOSIS — R06.02 SHORTNESS OF BREATH: ICD-10-CM

## 2017-08-06 DIAGNOSIS — R51.9 ACUTE INTRACTABLE HEADACHE, UNSPECIFIED HEADACHE TYPE: ICD-10-CM

## 2017-08-06 DIAGNOSIS — R73.9 HYPERGLYCEMIA: ICD-10-CM

## 2017-08-06 DIAGNOSIS — M79.661 RIGHT CALF PAIN: Primary | ICD-10-CM

## 2017-08-06 LAB
ABSOLUTE EOS #: 0.1 K/UL (ref 0–0.4)
ABSOLUTE LYMPH #: 1.6 K/UL (ref 1–4.8)
ABSOLUTE MONO #: 0.5 K/UL (ref 0.1–1.2)
ANION GAP SERPL CALCULATED.3IONS-SCNC: 18 MMOL/L (ref 9–17)
BASOPHILS # BLD: 1 %
BASOPHILS ABSOLUTE: 0 K/UL (ref 0–0.2)
BUN BLDV-MCNC: 9 MG/DL (ref 6–20)
BUN/CREAT BLD: ABNORMAL (ref 9–20)
CALCIUM SERPL-MCNC: 8.8 MG/DL (ref 8.6–10.4)
CHLORIDE BLD-SCNC: 100 MMOL/L (ref 98–107)
CO2: 23 MMOL/L (ref 20–31)
CREAT SERPL-MCNC: 0.64 MG/DL (ref 0.5–0.9)
DIFFERENTIAL TYPE: ABNORMAL
EOSINOPHILS RELATIVE PERCENT: 1 %
GFR AFRICAN AMERICAN: >60 ML/MIN
GFR NON-AFRICAN AMERICAN: >60 ML/MIN
GFR SERPL CREATININE-BSD FRML MDRD: ABNORMAL ML/MIN/{1.73_M2}
GFR SERPL CREATININE-BSD FRML MDRD: ABNORMAL ML/MIN/{1.73_M2}
GLUCOSE BLD-MCNC: 432 MG/DL (ref 70–99)
HCT VFR BLD CALC: 39.4 % (ref 36–46)
HEMOGLOBIN: 13.3 G/DL (ref 12–16)
LYMPHOCYTES # BLD: 26 %
MCH RBC QN AUTO: 30.4 PG (ref 26–34)
MCHC RBC AUTO-ENTMCNC: 33.7 G/DL (ref 31–37)
MCV RBC AUTO: 90.1 FL (ref 80–100)
MONOCYTES # BLD: 8 %
PDW BLD-RTO: 13.8 % (ref 12.5–15.4)
PLATELET # BLD: 211 K/UL (ref 140–450)
PLATELET ESTIMATE: ABNORMAL
PMV BLD AUTO: 12.3 FL (ref 6–12)
POC TROPONIN I: 0 NG/ML (ref 0–0.1)
POC TROPONIN INTERP: NORMAL
POTASSIUM SERPL-SCNC: 4.2 MMOL/L (ref 3.7–5.3)
RBC # BLD: 4.38 M/UL (ref 4–5.2)
RBC # BLD: ABNORMAL 10*6/UL
SEG NEUTROPHILS: 64 %
SEGMENTED NEUTROPHILS ABSOLUTE COUNT: 4 K/UL (ref 1.8–7.7)
SODIUM BLD-SCNC: 141 MMOL/L (ref 135–144)
WBC # BLD: 6.2 K/UL (ref 3.5–11)
WBC # BLD: ABNORMAL 10*3/UL

## 2017-08-06 PROCEDURE — 85025 COMPLETE CBC W/AUTO DIFF WBC: CPT

## 2017-08-06 PROCEDURE — 93005 ELECTROCARDIOGRAM TRACING: CPT

## 2017-08-06 PROCEDURE — 96375 TX/PRO/DX INJ NEW DRUG ADDON: CPT

## 2017-08-06 PROCEDURE — 2580000003 HC RX 258: Performed by: EMERGENCY MEDICINE

## 2017-08-06 PROCEDURE — 70450 CT HEAD/BRAIN W/O DYE: CPT

## 2017-08-06 PROCEDURE — 82010 KETONE BODYS QUAN: CPT

## 2017-08-06 PROCEDURE — 6360000002 HC RX W HCPCS: Performed by: EMERGENCY MEDICINE

## 2017-08-06 PROCEDURE — 84484 ASSAY OF TROPONIN QUANT: CPT

## 2017-08-06 PROCEDURE — 80048 BASIC METABOLIC PNL TOTAL CA: CPT

## 2017-08-06 PROCEDURE — 96374 THER/PROPH/DIAG INJ IV PUSH: CPT

## 2017-08-06 PROCEDURE — 99284 EMERGENCY DEPT VISIT MOD MDM: CPT

## 2017-08-06 PROCEDURE — 71020 XR CHEST STANDARD TWO VW: CPT

## 2017-08-06 PROCEDURE — 96361 HYDRATE IV INFUSION ADD-ON: CPT

## 2017-08-06 RX ORDER — DIPHENHYDRAMINE HYDROCHLORIDE 50 MG/ML
25 INJECTION INTRAMUSCULAR; INTRAVENOUS ONCE
Status: COMPLETED | OUTPATIENT
Start: 2017-08-06 | End: 2017-08-06

## 2017-08-06 RX ORDER — DEXAMETHASONE SODIUM PHOSPHATE 10 MG/ML
10 INJECTION INTRAMUSCULAR; INTRAVENOUS ONCE
Status: COMPLETED | OUTPATIENT
Start: 2017-08-06 | End: 2017-08-06

## 2017-08-06 RX ORDER — 0.9 % SODIUM CHLORIDE 0.9 %
1000 INTRAVENOUS SOLUTION INTRAVENOUS ONCE
Status: COMPLETED | OUTPATIENT
Start: 2017-08-06 | End: 2017-08-07

## 2017-08-06 RX ORDER — FENTANYL CITRATE 50 UG/ML
50 INJECTION, SOLUTION INTRAMUSCULAR; INTRAVENOUS ONCE
Status: COMPLETED | OUTPATIENT
Start: 2017-08-06 | End: 2017-08-06

## 2017-08-06 RX ORDER — PROMETHAZINE HYDROCHLORIDE 25 MG/ML
12.5 INJECTION, SOLUTION INTRAMUSCULAR; INTRAVENOUS ONCE
Status: COMPLETED | OUTPATIENT
Start: 2017-08-06 | End: 2017-08-06

## 2017-08-06 RX ADMIN — DEXAMETHASONE SODIUM PHOSPHATE 10 MG: 10 INJECTION INTRAMUSCULAR; INTRAVENOUS at 21:52

## 2017-08-06 RX ADMIN — SODIUM CHLORIDE 1000 ML: 9 INJECTION, SOLUTION INTRAVENOUS at 23:59

## 2017-08-06 RX ADMIN — PROMETHAZINE HYDROCHLORIDE 12.5 MG: 25 INJECTION INTRAMUSCULAR; INTRAVENOUS at 21:54

## 2017-08-06 RX ADMIN — FENTANYL CITRATE 50 MCG: 50 INJECTION, SOLUTION INTRAMUSCULAR; INTRAVENOUS at 21:55

## 2017-08-06 RX ADMIN — DIPHENHYDRAMINE HYDROCHLORIDE 25 MG: 50 INJECTION, SOLUTION INTRAMUSCULAR; INTRAVENOUS at 21:53

## 2017-08-06 ASSESSMENT — PAIN DESCRIPTION - INTENSITY: RATING_2: 8

## 2017-08-06 ASSESSMENT — PAIN DESCRIPTION - LOCATION
LOCATION: LEG
LOCATION_2: HEAD

## 2017-08-06 ASSESSMENT — PAIN DESCRIPTION - ORIENTATION: ORIENTATION: RIGHT;LOWER

## 2017-08-06 ASSESSMENT — PAIN SCALES - GENERAL
PAINLEVEL_OUTOF10: 8
PAINLEVEL_OUTOF10: 8

## 2017-08-07 ENCOUNTER — APPOINTMENT (OUTPATIENT)
Dept: CT IMAGING | Age: 37
End: 2017-08-07
Payer: MEDICARE

## 2017-08-07 ENCOUNTER — APPOINTMENT (OUTPATIENT)
Dept: MRI IMAGING | Age: 37
End: 2017-08-07
Payer: MEDICARE

## 2017-08-07 VITALS
TEMPERATURE: 97.5 F | SYSTOLIC BLOOD PRESSURE: 140 MMHG | RESPIRATION RATE: 17 BRPM | HEIGHT: 69 IN | DIASTOLIC BLOOD PRESSURE: 91 MMHG | HEART RATE: 78 BPM | WEIGHT: 293 LBS | OXYGEN SATURATION: 99 % | BODY MASS INDEX: 43.4 KG/M2

## 2017-08-07 LAB
ALLEN TEST: NORMAL
ANION GAP: 8 MMOL/L (ref 7–16)
BETA-HYDROXYBUTYRATE: 0.08 MMOL/L (ref 0.02–0.27)
CHP ED QC CHECK: YES
FIO2: NORMAL
GFR NON-AFRICAN AMERICAN: >60 ML/MIN
GFR SERPL CREATININE-BSD FRML MDRD: >60 ML/MIN
GFR SERPL CREATININE-BSD FRML MDRD: ABNORMAL ML/MIN/{1.73_M2}
GLUCOSE BLD-MCNC: 268 MG/DL
GLUCOSE BLD-MCNC: 268 MG/DL (ref 65–105)
GLUCOSE BLD-MCNC: 375 MG/DL (ref 74–100)
HCO3 VENOUS: 25.6 MMOL/L (ref 22–29)
MODE: NORMAL
NEGATIVE BASE EXCESS, VEN: NORMAL (ref 0–2)
O2 DEVICE/FLOW/%: NORMAL
O2 SAT, VEN: 60 % (ref 60–85)
PATIENT TEMP: NORMAL
PCO2, VEN: 45.7 MM HG (ref 41–51)
PH VENOUS: 7.36 (ref 7.32–7.43)
PO2, VEN: 32.8 MM HG (ref 30–50)
POC CHLORIDE: 106 MMOL/L (ref 98–107)
POC CREATININE: 0.48 MG/DL (ref 0.51–1.19)
POC HEMATOCRIT: 39 % (ref 36–46)
POC HEMOGLOBIN: 13.4 G/DL (ref 12–16)
POC IONIZED CALCIUM: 1.11 MMOL/L (ref 1.15–1.33)
POC LACTIC ACID: 1.26 MMOL/L (ref 0.56–1.39)
POC PCO2 TEMP: NORMAL MM HG
POC PH TEMP: NORMAL
POC PO2 TEMP: NORMAL MM HG
POC POTASSIUM: 4.2 MMOL/L (ref 3.5–4.5)
POC SODIUM: 140 MMOL/L (ref 138–146)
POC TROPONIN I: 0 NG/ML (ref 0–0.1)
POC TROPONIN INTERP: NORMAL
POSITIVE BASE EXCESS, VEN: 0 (ref 0–3)
SAMPLE SITE: NORMAL
TOTAL CO2, VENOUS: 27 MMOL/L (ref 23–30)

## 2017-08-07 PROCEDURE — 82330 ASSAY OF CALCIUM: CPT

## 2017-08-07 PROCEDURE — 84132 ASSAY OF SERUM POTASSIUM: CPT

## 2017-08-07 PROCEDURE — 82803 BLOOD GASES ANY COMBINATION: CPT

## 2017-08-07 PROCEDURE — 70546 MR ANGIOGRAPH HEAD W/O&W/DYE: CPT

## 2017-08-07 PROCEDURE — 6360000002 HC RX W HCPCS: Performed by: EMERGENCY MEDICINE

## 2017-08-07 PROCEDURE — 82565 ASSAY OF CREATININE: CPT

## 2017-08-07 PROCEDURE — 6360000004 HC RX CONTRAST MEDICATION: Performed by: EMERGENCY MEDICINE

## 2017-08-07 PROCEDURE — 85014 HEMATOCRIT: CPT

## 2017-08-07 PROCEDURE — 82947 ASSAY GLUCOSE BLOOD QUANT: CPT

## 2017-08-07 PROCEDURE — A9579 GAD-BASE MR CONTRAST NOS,1ML: HCPCS | Performed by: EMERGENCY MEDICINE

## 2017-08-07 PROCEDURE — 96372 THER/PROPH/DIAG INJ SC/IM: CPT

## 2017-08-07 PROCEDURE — 96375 TX/PRO/DX INJ NEW DRUG ADDON: CPT

## 2017-08-07 PROCEDURE — 70551 MRI BRAIN STEM W/O DYE: CPT

## 2017-08-07 PROCEDURE — 71260 CT THORAX DX C+: CPT

## 2017-08-07 PROCEDURE — 83605 ASSAY OF LACTIC ACID: CPT

## 2017-08-07 PROCEDURE — 96376 TX/PRO/DX INJ SAME DRUG ADON: CPT

## 2017-08-07 PROCEDURE — 84295 ASSAY OF SERUM SODIUM: CPT

## 2017-08-07 PROCEDURE — 96361 HYDRATE IV INFUSION ADD-ON: CPT

## 2017-08-07 PROCEDURE — 82435 ASSAY OF BLOOD CHLORIDE: CPT

## 2017-08-07 PROCEDURE — 6370000000 HC RX 637 (ALT 250 FOR IP): Performed by: EMERGENCY MEDICINE

## 2017-08-07 RX ORDER — BUTALBITAL, ACETAMINOPHEN AND CAFFEINE 50; 325; 40 MG/1; MG/1; MG/1
1 CAPSULE ORAL EVERY 6 HOURS PRN
Qty: 20 CAPSULE | Refills: 0 | Status: SHIPPED | OUTPATIENT
Start: 2017-08-07 | End: 2017-09-22

## 2017-08-07 RX ORDER — ONDANSETRON 2 MG/ML
4 INJECTION INTRAMUSCULAR; INTRAVENOUS ONCE
Status: COMPLETED | OUTPATIENT
Start: 2017-08-07 | End: 2017-08-07

## 2017-08-07 RX ORDER — BUTALBITAL, ACETAMINOPHEN AND CAFFEINE 50; 325; 40 MG/1; MG/1; MG/1
2 TABLET ORAL ONCE
Status: DISCONTINUED | OUTPATIENT
Start: 2017-08-07 | End: 2017-08-07

## 2017-08-07 RX ORDER — 0.9 % SODIUM CHLORIDE 0.9 %
1000 INTRAVENOUS SOLUTION INTRAVENOUS ONCE
Status: DISCONTINUED | OUTPATIENT
Start: 2017-08-07 | End: 2017-08-07 | Stop reason: HOSPADM

## 2017-08-07 RX ORDER — PROMETHAZINE HYDROCHLORIDE 25 MG/ML
12.5 INJECTION, SOLUTION INTRAMUSCULAR; INTRAVENOUS ONCE
Status: COMPLETED | OUTPATIENT
Start: 2017-08-07 | End: 2017-08-07

## 2017-08-07 RX ORDER — FENTANYL CITRATE 50 UG/ML
100 INJECTION, SOLUTION INTRAMUSCULAR; INTRAVENOUS ONCE
Status: COMPLETED | OUTPATIENT
Start: 2017-08-07 | End: 2017-08-07

## 2017-08-07 RX ORDER — SODIUM CHLORIDE 0.9 % (FLUSH) 0.9 %
10 SYRINGE (ML) INJECTION ONCE
Status: DISCONTINUED | OUTPATIENT
Start: 2017-08-07 | End: 2017-08-07 | Stop reason: HOSPADM

## 2017-08-07 RX ORDER — OXYCODONE HYDROCHLORIDE AND ACETAMINOPHEN 5; 325 MG/1; MG/1
2 TABLET ORAL ONCE
Status: DISCONTINUED | OUTPATIENT
Start: 2017-08-07 | End: 2017-08-07

## 2017-08-07 RX ADMIN — PROMETHAZINE HYDROCHLORIDE 12.5 MG: 25 INJECTION INTRAMUSCULAR; INTRAVENOUS at 01:01

## 2017-08-07 RX ADMIN — FENTANYL CITRATE 100 MCG: 50 INJECTION, SOLUTION INTRAMUSCULAR; INTRAVENOUS at 01:02

## 2017-08-07 RX ADMIN — INSULIN LISPRO 10 UNITS: 100 INJECTION, SOLUTION INTRAVENOUS; SUBCUTANEOUS at 01:03

## 2017-08-07 RX ADMIN — GADOPENTETATE DIMEGLUMINE 20 ML: 469.01 INJECTION INTRAVENOUS at 02:28

## 2017-08-07 RX ADMIN — ONDANSETRON 4 MG: 2 INJECTION, SOLUTION INTRAMUSCULAR; INTRAVENOUS at 04:24

## 2017-08-07 RX ADMIN — IOVERSOL 85 ML: 741 INJECTION INTRA-ARTERIAL; INTRAVENOUS at 00:51

## 2017-08-07 ASSESSMENT — ENCOUNTER SYMPTOMS
CHEST TIGHTNESS: 0
PHOTOPHOBIA: 1
DIARRHEA: 0
NAUSEA: 1
COUGH: 0
SHORTNESS OF BREATH: 1
VOMITING: 1
ABDOMINAL PAIN: 0
STRIDOR: 0

## 2017-08-07 ASSESSMENT — PAIN SCALES - GENERAL: PAINLEVEL_OUTOF10: 7

## 2017-08-08 ENCOUNTER — HOSPITAL ENCOUNTER (EMERGENCY)
Age: 37
Discharge: HOME | End: 2017-08-08
Attending: EMERGENCY MEDICINE
Payer: MEDICARE

## 2017-08-08 VITALS
OXYGEN SATURATION: 100 % | HEIGHT: 68 IN | DIASTOLIC BLOOD PRESSURE: 103 MMHG | TEMPERATURE: 98 F | SYSTOLIC BLOOD PRESSURE: 145 MMHG | BODY MASS INDEX: 44.41 KG/M2 | WEIGHT: 293 LBS | RESPIRATION RATE: 16 BRPM | HEART RATE: 94 BPM

## 2017-08-08 DIAGNOSIS — L02.214 ABSCESS OF GROIN, LEFT: Primary | ICD-10-CM

## 2017-08-08 PROCEDURE — 87186 SC STD MICRODIL/AGAR DIL: CPT

## 2017-08-08 PROCEDURE — 87070 CULTURE OTHR SPECIMN AEROBIC: CPT

## 2017-08-08 PROCEDURE — 86403 PARTICLE AGGLUT ANTBDY SCRN: CPT

## 2017-08-08 PROCEDURE — 87205 SMEAR GRAM STAIN: CPT

## 2017-08-08 PROCEDURE — 2500000003 HC RX 250 WO HCPCS

## 2017-08-08 PROCEDURE — 10060 I&D ABSCESS SIMPLE/SINGLE: CPT

## 2017-08-08 PROCEDURE — 6370000000 HC RX 637 (ALT 250 FOR IP): Performed by: EMERGENCY MEDICINE

## 2017-08-08 PROCEDURE — 99283 EMERGENCY DEPT VISIT LOW MDM: CPT

## 2017-08-08 RX ORDER — LIDOCAINE HYDROCHLORIDE 10 MG/ML
INJECTION, SOLUTION INFILTRATION; PERINEURAL
Status: COMPLETED
Start: 2017-08-08 | End: 2017-08-08

## 2017-08-08 RX ORDER — CEPHALEXIN 500 MG/1
500 CAPSULE ORAL 3 TIMES DAILY
Qty: 21 CAPSULE | Refills: 0 | Status: SHIPPED | OUTPATIENT
Start: 2017-08-08 | End: 2017-08-15

## 2017-08-08 RX ORDER — SULFAMETHOXAZOLE AND TRIMETHOPRIM 800; 160 MG/1; MG/1
1 TABLET ORAL ONCE
Status: COMPLETED | OUTPATIENT
Start: 2017-08-08 | End: 2017-08-08

## 2017-08-08 RX ORDER — HYDROCODONE BITARTRATE AND ACETAMINOPHEN 5; 325 MG/1; MG/1
1 TABLET ORAL EVERY 8 HOURS PRN
Qty: 6 TABLET | Refills: 0 | Status: SHIPPED | OUTPATIENT
Start: 2017-08-08 | End: 2017-08-09 | Stop reason: ALTCHOICE

## 2017-08-08 RX ORDER — CEPHALEXIN 250 MG/1
500 CAPSULE ORAL ONCE
Status: COMPLETED | OUTPATIENT
Start: 2017-08-08 | End: 2017-08-08

## 2017-08-08 RX ORDER — LIDOCAINE HYDROCHLORIDE 10 MG/ML
20 INJECTION, SOLUTION INFILTRATION; PERINEURAL ONCE
Status: COMPLETED | OUTPATIENT
Start: 2017-08-08 | End: 2017-08-08

## 2017-08-08 RX ORDER — HYDROCODONE BITARTRATE AND ACETAMINOPHEN 5; 325 MG/1; MG/1
1 TABLET ORAL ONCE
Status: COMPLETED | OUTPATIENT
Start: 2017-08-08 | End: 2017-08-08

## 2017-08-08 RX ORDER — SULFAMETHOXAZOLE AND TRIMETHOPRIM 800; 160 MG/1; MG/1
1 TABLET ORAL 2 TIMES DAILY
Qty: 14 TABLET | Refills: 0 | Status: SHIPPED | OUTPATIENT
Start: 2017-08-08 | End: 2017-08-15

## 2017-08-08 RX ADMIN — LIDOCAINE HYDROCHLORIDE: 10 INJECTION, SOLUTION INFILTRATION; PERINEURAL at 21:45

## 2017-08-08 RX ADMIN — HYDROCODONE BITARTRATE AND ACETAMINOPHEN 1 TABLET: 5; 325 TABLET ORAL at 22:16

## 2017-08-08 RX ADMIN — CEPHALEXIN 500 MG: 250 CAPSULE ORAL at 22:17

## 2017-08-08 RX ADMIN — SULFAMETHOXAZOLE AND TRIMETHOPRIM 1 TABLET: 800; 160 TABLET ORAL at 22:17

## 2017-08-08 ASSESSMENT — PAIN DESCRIPTION - DESCRIPTORS: DESCRIPTORS: DISCOMFORT;SORE

## 2017-08-08 ASSESSMENT — PAIN DESCRIPTION - FREQUENCY: FREQUENCY: CONTINUOUS

## 2017-08-08 ASSESSMENT — PAIN SCALES - GENERAL
PAINLEVEL_OUTOF10: 7

## 2017-08-08 ASSESSMENT — PAIN DESCRIPTION - PAIN TYPE: TYPE: ACUTE PAIN

## 2017-08-08 ASSESSMENT — PAIN DESCRIPTION - LOCATION: LOCATION: VAGINA

## 2017-08-09 ENCOUNTER — HOSPITAL ENCOUNTER (EMERGENCY)
Age: 37
Discharge: HOME | End: 2017-08-09
Attending: EMERGENCY MEDICINE
Payer: MEDICARE

## 2017-08-09 VITALS
HEART RATE: 83 BPM | WEIGHT: 293 LBS | BODY MASS INDEX: 45.61 KG/M2 | RESPIRATION RATE: 17 BRPM | TEMPERATURE: 99.5 F | DIASTOLIC BLOOD PRESSURE: 100 MMHG | SYSTOLIC BLOOD PRESSURE: 148 MMHG | OXYGEN SATURATION: 98 %

## 2017-08-09 DIAGNOSIS — N76.4 LEFT GENITAL LABIAL ABSCESS: ICD-10-CM

## 2017-08-09 DIAGNOSIS — Z48.00 CHANGE OR REMOVAL OF WOUND DRESSING: Primary | ICD-10-CM

## 2017-08-09 PROCEDURE — 6370000000 HC RX 637 (ALT 250 FOR IP): Performed by: PHYSICIAN ASSISTANT

## 2017-08-09 PROCEDURE — G0382 LEV 3 HOSP TYPE B ED VISIT: HCPCS

## 2017-08-09 RX ORDER — OXYCODONE HYDROCHLORIDE AND ACETAMINOPHEN 5; 325 MG/1; MG/1
1 TABLET ORAL EVERY 8 HOURS PRN
Qty: 10 TABLET | Refills: 0 | Status: SHIPPED | OUTPATIENT
Start: 2017-08-09 | End: 2017-09-22

## 2017-08-09 RX ORDER — OXYCODONE HYDROCHLORIDE AND ACETAMINOPHEN 5; 325 MG/1; MG/1
1 TABLET ORAL ONCE
Status: COMPLETED | OUTPATIENT
Start: 2017-08-09 | End: 2017-08-09

## 2017-08-09 RX ADMIN — OXYCODONE HYDROCHLORIDE AND ACETAMINOPHEN 1 TABLET: 5; 325 TABLET ORAL at 19:04

## 2017-08-09 ASSESSMENT — PAIN SCALES - GENERAL: PAINLEVEL_OUTOF10: 10

## 2017-08-09 ASSESSMENT — ENCOUNTER SYMPTOMS
RESPIRATORY NEGATIVE: 1
EYES NEGATIVE: 1
SHORTNESS OF BREATH: 0
ALLERGIC/IMMUNOLOGIC NEGATIVE: 1
GASTROINTESTINAL NEGATIVE: 1
NAUSEA: 0
ABDOMINAL PAIN: 0
VOMITING: 0
COLOR CHANGE: 1

## 2017-08-11 LAB
CULTURE: ABNORMAL
DIRECT EXAM: ABNORMAL
DIRECT EXAM: ABNORMAL
Lab: ABNORMAL
ORGANISM: ABNORMAL
SPECIMEN DESCRIPTION: ABNORMAL
SPECIMEN DESCRIPTION: ABNORMAL
STATUS: ABNORMAL

## 2017-08-15 LAB
EKG ATRIAL RATE: 109 BPM
EKG P AXIS: 44 DEGREES
EKG P-R INTERVAL: 146 MS
EKG Q-T INTERVAL: 360 MS
EKG QRS DURATION: 86 MS
EKG QTC CALCULATION (BAZETT): 484 MS
EKG R AXIS: 12 DEGREES
EKG T AXIS: -18 DEGREES
EKG VENTRICULAR RATE: 109 BPM

## 2017-08-22 ENCOUNTER — APPOINTMENT (OUTPATIENT)
Dept: CT IMAGING | Age: 37
End: 2017-08-22
Payer: MEDICARE

## 2017-08-22 ENCOUNTER — HOSPITAL ENCOUNTER (EMERGENCY)
Age: 37
Discharge: HOME | End: 2017-08-23
Attending: EMERGENCY MEDICINE
Payer: MEDICARE

## 2017-08-22 ENCOUNTER — APPOINTMENT (OUTPATIENT)
Dept: GENERAL RADIOLOGY | Age: 37
End: 2017-08-22
Payer: MEDICARE

## 2017-08-22 VITALS
RESPIRATION RATE: 17 BRPM | OXYGEN SATURATION: 98 % | BODY MASS INDEX: 44.41 KG/M2 | HEIGHT: 68 IN | HEART RATE: 96 BPM | SYSTOLIC BLOOD PRESSURE: 158 MMHG | TEMPERATURE: 99 F | WEIGHT: 293 LBS | DIASTOLIC BLOOD PRESSURE: 90 MMHG

## 2017-08-22 DIAGNOSIS — R07.82 INTERCOSTAL PAIN: Primary | ICD-10-CM

## 2017-08-22 LAB
ABSOLUTE EOS #: 0.1 K/UL (ref 0–0.4)
ABSOLUTE LYMPH #: 1.6 K/UL (ref 1–4.8)
ABSOLUTE MONO #: 0.5 K/UL (ref 0.1–1.2)
ANION GAP SERPL CALCULATED.3IONS-SCNC: 14 MMOL/L (ref 9–17)
BASOPHILS # BLD: 0 %
BASOPHILS ABSOLUTE: 0 K/UL (ref 0–0.2)
BUN BLDV-MCNC: 15 MG/DL (ref 6–20)
BUN/CREAT BLD: ABNORMAL (ref 9–20)
CALCIUM SERPL-MCNC: 9 MG/DL (ref 8.6–10.4)
CHLORIDE BLD-SCNC: 100 MMOL/L (ref 98–107)
CO2: 22 MMOL/L (ref 20–31)
CREAT SERPL-MCNC: 0.76 MG/DL (ref 0.5–0.9)
DIFFERENTIAL TYPE: NORMAL
EOSINOPHILS RELATIVE PERCENT: 1 %
GFR AFRICAN AMERICAN: >60 ML/MIN
GFR NON-AFRICAN AMERICAN: >60 ML/MIN
GFR SERPL CREATININE-BSD FRML MDRD: ABNORMAL ML/MIN/{1.73_M2}
GFR SERPL CREATININE-BSD FRML MDRD: ABNORMAL ML/MIN/{1.73_M2}
GLUCOSE BLD-MCNC: 313 MG/DL (ref 70–99)
HCT VFR BLD CALC: 37 % (ref 36–46)
HEMOGLOBIN: 12.5 G/DL (ref 12–16)
LYMPHOCYTES # BLD: 19 %
MCH RBC QN AUTO: 30.3 PG (ref 26–34)
MCHC RBC AUTO-ENTMCNC: 33.8 G/DL (ref 31–37)
MCV RBC AUTO: 89.6 FL (ref 80–100)
MONOCYTES # BLD: 6 %
PDW BLD-RTO: 14 % (ref 12.5–15.4)
PLATELET # BLD: 247 K/UL (ref 140–450)
PLATELET ESTIMATE: NORMAL
PMV BLD AUTO: 10.5 FL (ref 6–12)
POC TROPONIN I: 0.06 NG/ML (ref 0–0.1)
POC TROPONIN I: 0.08 NG/ML (ref 0–0.1)
POC TROPONIN INTERP: NORMAL
POC TROPONIN INTERP: NORMAL
POTASSIUM SERPL-SCNC: 4.4 MMOL/L (ref 3.7–5.3)
RBC # BLD: 4.12 M/UL (ref 4–5.2)
RBC # BLD: NORMAL 10*6/UL
SEG NEUTROPHILS: 74 %
SEGMENTED NEUTROPHILS ABSOLUTE COUNT: 6.1 K/UL (ref 1.8–7.7)
SODIUM BLD-SCNC: 136 MMOL/L (ref 135–144)
WBC # BLD: 8.3 K/UL (ref 3.5–11)
WBC # BLD: NORMAL 10*3/UL

## 2017-08-22 PROCEDURE — 6360000002 HC RX W HCPCS: Performed by: EMERGENCY MEDICINE

## 2017-08-22 PROCEDURE — 93005 ELECTROCARDIOGRAM TRACING: CPT

## 2017-08-22 PROCEDURE — 85025 COMPLETE CBC W/AUTO DIFF WBC: CPT

## 2017-08-22 PROCEDURE — 96374 THER/PROPH/DIAG INJ IV PUSH: CPT

## 2017-08-22 PROCEDURE — 80048 BASIC METABOLIC PNL TOTAL CA: CPT

## 2017-08-22 PROCEDURE — 99284 EMERGENCY DEPT VISIT MOD MDM: CPT

## 2017-08-22 PROCEDURE — 84484 ASSAY OF TROPONIN QUANT: CPT

## 2017-08-22 PROCEDURE — 6360000004 HC RX CONTRAST MEDICATION: Performed by: EMERGENCY MEDICINE

## 2017-08-22 PROCEDURE — 96375 TX/PRO/DX INJ NEW DRUG ADDON: CPT

## 2017-08-22 PROCEDURE — 71020 XR CHEST STANDARD TWO VW: CPT

## 2017-08-22 PROCEDURE — 71260 CT THORAX DX C+: CPT

## 2017-08-22 PROCEDURE — 96376 TX/PRO/DX INJ SAME DRUG ADON: CPT

## 2017-08-22 RX ORDER — ONDANSETRON 2 MG/ML
4 INJECTION INTRAMUSCULAR; INTRAVENOUS ONCE
Status: COMPLETED | OUTPATIENT
Start: 2017-08-22 | End: 2017-08-22

## 2017-08-22 RX ORDER — MORPHINE SULFATE 4 MG/ML
4 INJECTION, SOLUTION INTRAMUSCULAR; INTRAVENOUS ONCE
Status: COMPLETED | OUTPATIENT
Start: 2017-08-22 | End: 2017-08-22

## 2017-08-22 RX ORDER — MORPHINE SULFATE 15 MG/1
15 TABLET ORAL EVERY 4 HOURS PRN
Qty: 10 TABLET | Refills: 0 | Status: SHIPPED | OUTPATIENT
Start: 2017-08-22 | End: 2017-08-29

## 2017-08-22 RX ORDER — ONDANSETRON 4 MG/1
4 TABLET, ORALLY DISINTEGRATING ORAL EVERY 8 HOURS PRN
Qty: 20 TABLET | Refills: 0 | Status: ON HOLD | OUTPATIENT
Start: 2017-08-22 | End: 2018-11-17

## 2017-08-22 RX ADMIN — ONDANSETRON 4 MG: 2 INJECTION, SOLUTION INTRAMUSCULAR; INTRAVENOUS at 21:27

## 2017-08-22 RX ADMIN — MORPHINE SULFATE 4 MG: 4 INJECTION, SOLUTION INTRAMUSCULAR; INTRAVENOUS at 23:37

## 2017-08-22 RX ADMIN — ONDANSETRON 4 MG: 2 INJECTION, SOLUTION INTRAMUSCULAR; INTRAVENOUS at 22:19

## 2017-08-22 RX ADMIN — MORPHINE SULFATE 4 MG: 4 INJECTION, SOLUTION INTRAMUSCULAR; INTRAVENOUS at 22:19

## 2017-08-22 RX ADMIN — MORPHINE SULFATE 4 MG: 4 INJECTION, SOLUTION INTRAMUSCULAR; INTRAVENOUS at 21:27

## 2017-08-22 RX ADMIN — IOVERSOL 85 ML: 741 INJECTION INTRA-ARTERIAL; INTRAVENOUS at 22:06

## 2017-08-22 RX ADMIN — ONDANSETRON 4 MG: 2 INJECTION, SOLUTION INTRAMUSCULAR; INTRAVENOUS at 23:37

## 2017-08-22 ASSESSMENT — PAIN SCALES - GENERAL
PAINLEVEL_OUTOF10: 8
PAINLEVEL_OUTOF10: 7
PAINLEVEL_OUTOF10: 8

## 2017-08-23 ASSESSMENT — ENCOUNTER SYMPTOMS
VOMITING: 0
RHINORRHEA: 0
ABDOMINAL PAIN: 0
SHORTNESS OF BREATH: 0
WHEEZING: 0
COUGH: 0
COLOR CHANGE: 0
NAUSEA: 0

## 2017-08-24 LAB
EKG ATRIAL RATE: 100 BPM
EKG P AXIS: 57 DEGREES
EKG P-R INTERVAL: 138 MS
EKG Q-T INTERVAL: 370 MS
EKG QRS DURATION: 92 MS
EKG QTC CALCULATION (BAZETT): 477 MS
EKG R AXIS: 33 DEGREES
EKG T AXIS: 3 DEGREES
EKG VENTRICULAR RATE: 100 BPM

## 2017-09-11 ENCOUNTER — APPOINTMENT (OUTPATIENT)
Dept: GENERAL RADIOLOGY | Age: 37
End: 2017-09-11
Payer: MEDICARE

## 2017-09-11 ENCOUNTER — HOSPITAL ENCOUNTER (EMERGENCY)
Age: 37
Discharge: HOME | End: 2017-09-11
Attending: EMERGENCY MEDICINE
Payer: MEDICARE

## 2017-09-11 VITALS
OXYGEN SATURATION: 100 % | RESPIRATION RATE: 18 BRPM | DIASTOLIC BLOOD PRESSURE: 79 MMHG | HEART RATE: 89 BPM | TEMPERATURE: 97.2 F | SYSTOLIC BLOOD PRESSURE: 121 MMHG

## 2017-09-11 DIAGNOSIS — R07.82 INTERCOSTAL PAIN: Primary | ICD-10-CM

## 2017-09-11 LAB
ABSOLUTE EOS #: 0.1 K/UL (ref 0–0.4)
ABSOLUTE LYMPH #: 2 K/UL (ref 1–4.8)
ABSOLUTE MONO #: 0.4 K/UL (ref 0.1–1.2)
ANION GAP SERPL CALCULATED.3IONS-SCNC: 13 MMOL/L (ref 9–17)
BASOPHILS # BLD: 1 %
BASOPHILS ABSOLUTE: 0 K/UL (ref 0–0.2)
BUN BLDV-MCNC: 13 MG/DL (ref 6–20)
BUN/CREAT BLD: ABNORMAL (ref 9–20)
CALCIUM SERPL-MCNC: 9.2 MG/DL (ref 8.6–10.4)
CHLORIDE BLD-SCNC: 103 MMOL/L (ref 98–107)
CO2: 23 MMOL/L (ref 20–31)
CREAT SERPL-MCNC: 0.56 MG/DL (ref 0.5–0.9)
DIFFERENTIAL TYPE: ABNORMAL
EOSINOPHILS RELATIVE PERCENT: 1 %
GFR AFRICAN AMERICAN: >60 ML/MIN
GFR NON-AFRICAN AMERICAN: >60 ML/MIN
GFR SERPL CREATININE-BSD FRML MDRD: ABNORMAL ML/MIN/{1.73_M2}
GFR SERPL CREATININE-BSD FRML MDRD: ABNORMAL ML/MIN/{1.73_M2}
GLUCOSE BLD-MCNC: 185 MG/DL (ref 70–99)
HCT VFR BLD CALC: 41.6 % (ref 36–46)
HEMOGLOBIN: 14 G/DL (ref 12–16)
INR BLD: 0.9
LYMPHOCYTES # BLD: 29 %
MCH RBC QN AUTO: 30.2 PG (ref 26–34)
MCHC RBC AUTO-ENTMCNC: 33.6 G/DL (ref 31–37)
MCV RBC AUTO: 89.8 FL (ref 80–100)
MONOCYTES # BLD: 6 %
PDW BLD-RTO: 14.1 % (ref 12.5–15.4)
PLATELET # BLD: 222 K/UL (ref 140–450)
PLATELET ESTIMATE: ABNORMAL
PMV BLD AUTO: 12.4 FL (ref 6–12)
POC TROPONIN I: 0 NG/ML (ref 0–0.1)
POC TROPONIN I: 0 NG/ML (ref 0–0.1)
POC TROPONIN INTERP: NORMAL
POC TROPONIN INTERP: NORMAL
POTASSIUM SERPL-SCNC: 3.8 MMOL/L (ref 3.7–5.3)
PROTHROMBIN TIME: 10.2 SEC (ref 9.4–12.6)
RBC # BLD: 4.63 M/UL (ref 4–5.2)
RBC # BLD: ABNORMAL 10*6/UL
SEG NEUTROPHILS: 63 %
SEGMENTED NEUTROPHILS ABSOLUTE COUNT: 4.3 K/UL (ref 1.8–7.7)
SODIUM BLD-SCNC: 139 MMOL/L (ref 135–144)
WBC # BLD: 6.8 K/UL (ref 3.5–11)
WBC # BLD: ABNORMAL 10*3/UL

## 2017-09-11 PROCEDURE — 6360000002 HC RX W HCPCS: Performed by: STUDENT IN AN ORGANIZED HEALTH CARE EDUCATION/TRAINING PROGRAM

## 2017-09-11 PROCEDURE — 93005 ELECTROCARDIOGRAM TRACING: CPT

## 2017-09-11 PROCEDURE — 85025 COMPLETE CBC W/AUTO DIFF WBC: CPT

## 2017-09-11 PROCEDURE — 80048 BASIC METABOLIC PNL TOTAL CA: CPT

## 2017-09-11 PROCEDURE — 85610 PROTHROMBIN TIME: CPT

## 2017-09-11 PROCEDURE — 84484 ASSAY OF TROPONIN QUANT: CPT

## 2017-09-11 PROCEDURE — 6370000000 HC RX 637 (ALT 250 FOR IP): Performed by: STUDENT IN AN ORGANIZED HEALTH CARE EDUCATION/TRAINING PROGRAM

## 2017-09-11 PROCEDURE — 99285 EMERGENCY DEPT VISIT HI MDM: CPT

## 2017-09-11 PROCEDURE — 71020 XR CHEST STANDARD TWO VW: CPT

## 2017-09-11 RX ORDER — HYDROCODONE BITARTRATE AND ACETAMINOPHEN 5; 325 MG/1; MG/1
1 TABLET ORAL ONCE
Status: COMPLETED | OUTPATIENT
Start: 2017-09-11 | End: 2017-09-11

## 2017-09-11 RX ORDER — ONDANSETRON 4 MG/1
4 TABLET, FILM COATED ORAL EVERY 8 HOURS PRN
Qty: 4 TABLET | Refills: 0 | Status: ON HOLD | OUTPATIENT
Start: 2017-09-11 | End: 2018-11-17

## 2017-09-11 RX ORDER — CYCLOBENZAPRINE HCL 10 MG
10 TABLET ORAL ONCE
Status: COMPLETED | OUTPATIENT
Start: 2017-09-11 | End: 2017-09-11

## 2017-09-11 RX ORDER — ONDANSETRON 4 MG/1
4 TABLET, FILM COATED ORAL ONCE
Status: COMPLETED | OUTPATIENT
Start: 2017-09-11 | End: 2017-09-11

## 2017-09-11 RX ADMIN — ONDANSETRON 4 MG: 4 TABLET, FILM COATED ORAL at 21:43

## 2017-09-11 RX ADMIN — HYDROCODONE BITARTRATE AND ACETAMINOPHEN 1 TABLET: 5; 325 TABLET ORAL at 21:43

## 2017-09-11 RX ADMIN — CYCLOBENZAPRINE HYDROCHLORIDE 10 MG: 10 TABLET, FILM COATED ORAL at 23:03

## 2017-09-11 ASSESSMENT — PAIN DESCRIPTION - ONSET: ONSET: PROGRESSIVE

## 2017-09-11 ASSESSMENT — PAIN DESCRIPTION - PROGRESSION: CLINICAL_PROGRESSION: NOT CHANGED

## 2017-09-11 ASSESSMENT — PAIN DESCRIPTION - LOCATION: LOCATION: CHEST

## 2017-09-11 ASSESSMENT — ENCOUNTER SYMPTOMS
BLOOD IN STOOL: 0
WHEEZING: 0
ABDOMINAL DISTENTION: 0
ABDOMINAL PAIN: 0
VOMITING: 1
COUGH: 1
SORE THROAT: 0
BACK PAIN: 0
RHINORRHEA: 0
NAUSEA: 1
SHORTNESS OF BREATH: 1
PHOTOPHOBIA: 0

## 2017-09-11 ASSESSMENT — PAIN DESCRIPTION - DESCRIPTORS: DESCRIPTORS: SHARP

## 2017-09-11 ASSESSMENT — PAIN DESCRIPTION - ORIENTATION: ORIENTATION: LEFT;RIGHT

## 2017-09-11 ASSESSMENT — PAIN SCALES - GENERAL
PAINLEVEL_OUTOF10: 10
PAINLEVEL_OUTOF10: 9

## 2017-09-11 ASSESSMENT — PAIN DESCRIPTION - FREQUENCY: FREQUENCY: CONTINUOUS

## 2017-09-13 LAB
EKG ATRIAL RATE: 91 BPM
EKG P AXIS: 67 DEGREES
EKG P-R INTERVAL: 136 MS
EKG Q-T INTERVAL: 362 MS
EKG QRS DURATION: 90 MS
EKG QTC CALCULATION (BAZETT): 445 MS
EKG R AXIS: 56 DEGREES
EKG T AXIS: 10 DEGREES
EKG VENTRICULAR RATE: 91 BPM

## 2017-09-18 ENCOUNTER — HOSPITAL ENCOUNTER (EMERGENCY)
Age: 37
Discharge: HOME | End: 2017-09-18
Attending: EMERGENCY MEDICINE
Payer: MEDICARE

## 2017-09-18 VITALS
DIASTOLIC BLOOD PRESSURE: 86 MMHG | HEART RATE: 93 BPM | WEIGHT: 290 LBS | BODY MASS INDEX: 43.95 KG/M2 | OXYGEN SATURATION: 100 % | HEIGHT: 68 IN | RESPIRATION RATE: 16 BRPM | SYSTOLIC BLOOD PRESSURE: 151 MMHG | TEMPERATURE: 97.5 F

## 2017-09-18 DIAGNOSIS — K04.7 DENTAL ABSCESS: Primary | ICD-10-CM

## 2017-09-18 PROCEDURE — 99283 EMERGENCY DEPT VISIT LOW MDM: CPT

## 2017-09-18 RX ORDER — PENICILLIN V POTASSIUM 500 MG/1
500 TABLET ORAL 4 TIMES DAILY
Qty: 40 TABLET | Refills: 0 | Status: SHIPPED | OUTPATIENT
Start: 2017-09-18 | End: 2017-09-22

## 2017-09-18 RX ORDER — TRAMADOL HYDROCHLORIDE 50 MG/1
50 TABLET ORAL EVERY 6 HOURS PRN
Qty: 10 TABLET | Refills: 0 | Status: SHIPPED | OUTPATIENT
Start: 2017-09-18 | End: 2017-12-14

## 2017-09-18 ASSESSMENT — PAIN DESCRIPTION - LOCATION: LOCATION: JAW

## 2017-09-18 ASSESSMENT — PAIN SCALES - GENERAL: PAINLEVEL_OUTOF10: 8

## 2017-09-18 ASSESSMENT — PAIN DESCRIPTION - ORIENTATION: ORIENTATION: RIGHT

## 2017-09-21 ENCOUNTER — APPOINTMENT (OUTPATIENT)
Dept: GENERAL RADIOLOGY | Age: 37
End: 2017-09-21
Payer: MEDICARE

## 2017-09-21 ENCOUNTER — HOSPITAL ENCOUNTER (EMERGENCY)
Age: 37
Discharge: HOME | End: 2017-09-21
Attending: EMERGENCY MEDICINE
Payer: MEDICARE

## 2017-09-21 VITALS
OXYGEN SATURATION: 100 % | HEART RATE: 91 BPM | RESPIRATION RATE: 16 BRPM | HEIGHT: 68 IN | SYSTOLIC BLOOD PRESSURE: 137 MMHG | WEIGHT: 290 LBS | BODY MASS INDEX: 43.95 KG/M2 | DIASTOLIC BLOOD PRESSURE: 93 MMHG | TEMPERATURE: 97.3 F

## 2017-09-21 DIAGNOSIS — M25.511 ACUTE PAIN OF RIGHT SHOULDER: Primary | ICD-10-CM

## 2017-09-21 PROCEDURE — 96372 THER/PROPH/DIAG INJ SC/IM: CPT

## 2017-09-21 PROCEDURE — 73030 X-RAY EXAM OF SHOULDER: CPT

## 2017-09-21 PROCEDURE — 73060 X-RAY EXAM OF HUMERUS: CPT

## 2017-09-21 PROCEDURE — G0382 LEV 3 HOSP TYPE B ED VISIT: HCPCS

## 2017-09-21 PROCEDURE — 6360000002 HC RX W HCPCS: Performed by: EMERGENCY MEDICINE

## 2017-09-21 RX ORDER — ACETAMINOPHEN 325 MG/1
650 TABLET ORAL EVERY 6 HOURS PRN
Qty: 30 TABLET | Refills: 0 | Status: SHIPPED | OUTPATIENT
Start: 2017-09-21 | End: 2017-09-22

## 2017-09-21 RX ORDER — ORPHENADRINE CITRATE 30 MG/ML
60 INJECTION INTRAMUSCULAR; INTRAVENOUS ONCE
Status: COMPLETED | OUTPATIENT
Start: 2017-09-21 | End: 2017-09-21

## 2017-09-21 RX ADMIN — ORPHENADRINE CITRATE 60 MG: 30 INJECTION INTRAMUSCULAR; INTRAVENOUS at 10:56

## 2017-09-21 ASSESSMENT — PAIN DESCRIPTION - DESCRIPTORS: DESCRIPTORS: ACHING

## 2017-09-21 ASSESSMENT — PAIN DESCRIPTION - ORIENTATION: ORIENTATION: RIGHT

## 2017-09-21 ASSESSMENT — PAIN SCALES - GENERAL: PAINLEVEL_OUTOF10: 9

## 2017-09-21 ASSESSMENT — PAIN DESCRIPTION - PAIN TYPE: TYPE: ACUTE PAIN

## 2017-09-21 ASSESSMENT — ENCOUNTER SYMPTOMS
COLOR CHANGE: 0
BACK PAIN: 0

## 2017-09-21 ASSESSMENT — PAIN DESCRIPTION - LOCATION: LOCATION: SHOULDER

## 2017-09-22 ENCOUNTER — APPOINTMENT (OUTPATIENT)
Dept: CT IMAGING | Age: 37
DRG: 639 | End: 2017-09-22
Payer: MEDICARE

## 2017-09-22 ENCOUNTER — HOSPITAL ENCOUNTER (EMERGENCY)
Age: 37
Discharge: LEFT BEFORE DISCHARGE | End: 2017-09-22
Attending: EMERGENCY MEDICINE
Payer: MEDICARE

## 2017-09-22 ENCOUNTER — HOSPITAL ENCOUNTER (EMERGENCY)
Age: 37
Discharge: HOME | DRG: 639 | End: 2017-09-22
Attending: EMERGENCY MEDICINE
Payer: MEDICARE

## 2017-09-22 VITALS
WEIGHT: 290 LBS | BODY MASS INDEX: 42.95 KG/M2 | RESPIRATION RATE: 18 BRPM | HEIGHT: 69 IN | DIASTOLIC BLOOD PRESSURE: 64 MMHG | HEART RATE: 100 BPM | TEMPERATURE: 97.2 F | OXYGEN SATURATION: 100 % | SYSTOLIC BLOOD PRESSURE: 130 MMHG

## 2017-09-22 VITALS
RESPIRATION RATE: 18 BRPM | OXYGEN SATURATION: 100 % | DIASTOLIC BLOOD PRESSURE: 58 MMHG | HEART RATE: 92 BPM | SYSTOLIC BLOOD PRESSURE: 110 MMHG | WEIGHT: 290 LBS | BODY MASS INDEX: 42.95 KG/M2 | HEIGHT: 69 IN | TEMPERATURE: 96.7 F

## 2017-09-22 DIAGNOSIS — J18.9 PNEUMONIA OF LEFT LOWER LOBE DUE TO INFECTIOUS ORGANISM: Primary | ICD-10-CM

## 2017-09-22 DIAGNOSIS — Z78.9 FAILURE OF OUTPATIENT TREATMENT: ICD-10-CM

## 2017-09-22 DIAGNOSIS — E16.2 HYPOGLYCEMIA: Primary | ICD-10-CM

## 2017-09-22 LAB
-: ABNORMAL
ABSOLUTE EOS #: 0 K/UL (ref 0–0.4)
ABSOLUTE EOS #: 0 K/UL (ref 0–0.4)
ABSOLUTE LYMPH #: 1.1 K/UL (ref 1–4.8)
ABSOLUTE LYMPH #: 1.7 K/UL (ref 1–4.8)
ABSOLUTE MONO #: 0.4 K/UL (ref 0.1–1.3)
ABSOLUTE MONO #: 0.7 K/UL (ref 0.1–1.2)
ALBUMIN SERPL-MCNC: 3.6 G/DL (ref 3.5–5.2)
ALBUMIN SERPL-MCNC: 4.2 G/DL (ref 3.5–5.2)
ALBUMIN/GLOBULIN RATIO: 1 (ref 1–2.5)
ALBUMIN/GLOBULIN RATIO: ABNORMAL (ref 1–2.5)
ALP BLD-CCNC: 107 U/L (ref 35–104)
ALP BLD-CCNC: 113 U/L (ref 35–104)
ALT SERPL-CCNC: 16 U/L (ref 5–33)
ALT SERPL-CCNC: 19 U/L (ref 5–33)
AMORPHOUS: ABNORMAL
AMPHETAMINE SCREEN URINE: NEGATIVE
ANION GAP SERPL CALCULATED.3IONS-SCNC: 15 MMOL/L (ref 9–17)
ANION GAP SERPL CALCULATED.3IONS-SCNC: 17 MMOL/L (ref 9–17)
AST SERPL-CCNC: 16 U/L
AST SERPL-CCNC: 20 U/L
BACTERIA: ABNORMAL
BARBITURATE SCREEN URINE: NEGATIVE
BASOPHILS # BLD: 1 %
BASOPHILS # BLD: 1 %
BASOPHILS ABSOLUTE: 0.1 K/UL (ref 0–0.2)
BASOPHILS ABSOLUTE: 0.1 K/UL (ref 0–0.2)
BENZODIAZEPINE SCREEN, URINE: POSITIVE
BILIRUB SERPL-MCNC: 0.17 MG/DL (ref 0.3–1.2)
BILIRUB SERPL-MCNC: <0.15 MG/DL (ref 0.3–1.2)
BILIRUBIN DIRECT: <0.08 MG/DL
BILIRUBIN URINE: NEGATIVE
BILIRUBIN, INDIRECT: ABNORMAL MG/DL (ref 0–1)
BUN BLDV-MCNC: 12 MG/DL (ref 6–20)
BUN BLDV-MCNC: 9 MG/DL (ref 6–20)
BUN/CREAT BLD: ABNORMAL (ref 9–20)
BUN/CREAT BLD: ABNORMAL (ref 9–20)
BUPRENORPHINE URINE: ABNORMAL
CALCIUM SERPL-MCNC: 9.6 MG/DL (ref 8.6–10.4)
CALCIUM SERPL-MCNC: 9.9 MG/DL (ref 8.6–10.4)
CANNABINOID SCREEN URINE: NEGATIVE
CASTS UA: ABNORMAL /LPF
CHLORIDE BLD-SCNC: 104 MMOL/L (ref 98–107)
CHLORIDE BLD-SCNC: 99 MMOL/L (ref 98–107)
CHP ED QC CHECK: NORMAL
CO2: 23 MMOL/L (ref 20–31)
CO2: 23 MMOL/L (ref 20–31)
COCAINE METABOLITE, URINE: NEGATIVE
COLOR: YELLOW
COMMENT UA: ABNORMAL
CREAT SERPL-MCNC: 0.38 MG/DL (ref 0.5–0.9)
CREAT SERPL-MCNC: 0.68 MG/DL (ref 0.5–0.9)
CRYSTALS, UA: ABNORMAL /HPF
DIFFERENTIAL TYPE: ABNORMAL
DIFFERENTIAL TYPE: NORMAL
EOSINOPHILS RELATIVE PERCENT: 0 %
EOSINOPHILS RELATIVE PERCENT: 1 %
EPITHELIAL CELLS UA: ABNORMAL /HPF
ETHANOL PERCENT: <0.01 %
ETHANOL: <10 MG/DL
GFR AFRICAN AMERICAN: >60 ML/MIN
GFR AFRICAN AMERICAN: >60 ML/MIN
GFR NON-AFRICAN AMERICAN: >60 ML/MIN
GFR NON-AFRICAN AMERICAN: >60 ML/MIN
GFR SERPL CREATININE-BSD FRML MDRD: ABNORMAL ML/MIN/{1.73_M2}
GLOBULIN: ABNORMAL G/DL (ref 1.5–3.8)
GLUCOSE BLD-MCNC: 100 MG/DL (ref 65–105)
GLUCOSE BLD-MCNC: 122 MG/DL (ref 65–105)
GLUCOSE BLD-MCNC: 140 MG/DL (ref 65–105)
GLUCOSE BLD-MCNC: 150 MG/DL (ref 65–105)
GLUCOSE BLD-MCNC: 153 MG/DL
GLUCOSE BLD-MCNC: 153 MG/DL (ref 65–105)
GLUCOSE BLD-MCNC: 155 MG/DL (ref 70–99)
GLUCOSE BLD-MCNC: 26 MG/DL (ref 65–105)
GLUCOSE BLD-MCNC: 36 MG/DL (ref 70–99)
GLUCOSE BLD-MCNC: 45 MG/DL (ref 65–105)
GLUCOSE BLD-MCNC: 83 MG/DL (ref 65–105)
GLUCOSE URINE: ABNORMAL
HCG QUALITATIVE: NEGATIVE
HCT VFR BLD CALC: 41.9 % (ref 36–46)
HCT VFR BLD CALC: 44.9 % (ref 36–46)
HEMOGLOBIN: 13.9 G/DL (ref 12–16)
HEMOGLOBIN: 14.7 G/DL (ref 12–16)
KETONES, URINE: NEGATIVE
LEUKOCYTE ESTERASE, URINE: NEGATIVE
LIPASE: 12 U/L (ref 13–60)
LIPASE: 14 U/L (ref 13–60)
LYMPHOCYTES # BLD: 19 %
LYMPHOCYTES # BLD: 9 %
MAGNESIUM: 1.8 MG/DL (ref 1.6–2.6)
MCH RBC QN AUTO: 29.9 PG (ref 26–34)
MCH RBC QN AUTO: 30.4 PG (ref 26–34)
MCHC RBC AUTO-ENTMCNC: 32.8 G/DL (ref 31–37)
MCHC RBC AUTO-ENTMCNC: 33.1 G/DL (ref 31–37)
MCV RBC AUTO: 90.1 FL (ref 80–100)
MCV RBC AUTO: 92.7 FL (ref 80–100)
MDMA URINE: ABNORMAL
METHADONE SCREEN, URINE: NEGATIVE
METHAMPHETAMINE, URINE: ABNORMAL
MONOCYTES # BLD: 3 %
MONOCYTES # BLD: 7 %
MUCUS: ABNORMAL
NITRITE, URINE: NEGATIVE
OPIATES, URINE: POSITIVE
OTHER OBSERVATIONS UA: ABNORMAL
OXYCODONE SCREEN URINE: POSITIVE
PDW BLD-RTO: 13.2 % (ref 11.5–14.9)
PDW BLD-RTO: 13.5 % (ref 12.5–15.4)
PH UA: 5.5 (ref 5–8)
PHENCYCLIDINE, URINE: NEGATIVE
PLATELET # BLD: 239 K/UL (ref 150–450)
PLATELET # BLD: 269 K/UL (ref 140–450)
PLATELET ESTIMATE: ABNORMAL
PLATELET ESTIMATE: NORMAL
PMV BLD AUTO: 11 FL (ref 6–12)
PMV BLD AUTO: 11.4 FL (ref 6–12)
POTASSIUM SERPL-SCNC: 3.8 MMOL/L (ref 3.7–5.3)
POTASSIUM SERPL-SCNC: 4 MMOL/L (ref 3.7–5.3)
PROPOXYPHENE, URINE: ABNORMAL
PROTEIN UA: ABNORMAL
RBC # BLD: 4.65 M/UL (ref 4–5.2)
RBC # BLD: 4.85 M/UL (ref 4–5.2)
RBC # BLD: ABNORMAL 10*6/UL
RBC # BLD: NORMAL 10*6/UL
RBC UA: ABNORMAL /HPF
RENAL EPITHELIAL, UA: ABNORMAL /HPF
SEG NEUTROPHILS: 72 %
SEG NEUTROPHILS: 87 %
SEGMENTED NEUTROPHILS ABSOLUTE COUNT: 10.2 K/UL (ref 1.3–9.1)
SEGMENTED NEUTROPHILS ABSOLUTE COUNT: 6.6 K/UL (ref 1.8–7.7)
SODIUM BLD-SCNC: 139 MMOL/L (ref 135–144)
SODIUM BLD-SCNC: 142 MMOL/L (ref 135–144)
SPECIFIC GRAVITY UA: 1.02 (ref 1–1.03)
TEST INFORMATION: ABNORMAL
TOTAL PROTEIN: 7.1 G/DL (ref 6.4–8.3)
TOTAL PROTEIN: 8.4 G/DL (ref 6.4–8.3)
TRICHOMONAS: ABNORMAL
TRICYCLIC ANTIDEPRESSANTS, UR: ABNORMAL
TURBIDITY: CLEAR
URINE HGB: NEGATIVE
UROBILINOGEN, URINE: NORMAL
WBC # BLD: 11.7 K/UL (ref 3.5–11)
WBC # BLD: 9.1 K/UL (ref 3.5–11)
WBC # BLD: ABNORMAL 10*3/UL
WBC # BLD: NORMAL 10*3/UL
WBC UA: ABNORMAL /HPF
YEAST: ABNORMAL

## 2017-09-22 PROCEDURE — 80307 DRUG TEST PRSMV CHEM ANLYZR: CPT

## 2017-09-22 PROCEDURE — 80076 HEPATIC FUNCTION PANEL: CPT

## 2017-09-22 PROCEDURE — 81001 URINALYSIS AUTO W/SCOPE: CPT

## 2017-09-22 PROCEDURE — 85025 COMPLETE CBC W/AUTO DIFF WBC: CPT

## 2017-09-22 PROCEDURE — 6360000002 HC RX W HCPCS: Performed by: EMERGENCY MEDICINE

## 2017-09-22 PROCEDURE — 82947 ASSAY GLUCOSE BLOOD QUANT: CPT

## 2017-09-22 PROCEDURE — 80053 COMPREHEN METABOLIC PANEL: CPT

## 2017-09-22 PROCEDURE — 96375 TX/PRO/DX INJ NEW DRUG ADDON: CPT

## 2017-09-22 PROCEDURE — 83690 ASSAY OF LIPASE: CPT

## 2017-09-22 PROCEDURE — 96365 THER/PROPH/DIAG IV INF INIT: CPT

## 2017-09-22 PROCEDURE — 36415 COLL VENOUS BLD VENIPUNCTURE: CPT

## 2017-09-22 PROCEDURE — 6370000000 HC RX 637 (ALT 250 FOR IP): Performed by: EMERGENCY MEDICINE

## 2017-09-22 PROCEDURE — 83735 ASSAY OF MAGNESIUM: CPT

## 2017-09-22 PROCEDURE — 2580000003 HC RX 258: Performed by: EMERGENCY MEDICINE

## 2017-09-22 PROCEDURE — 84703 CHORIONIC GONADOTROPIN ASSAY: CPT

## 2017-09-22 PROCEDURE — 99285 EMERGENCY DEPT VISIT HI MDM: CPT

## 2017-09-22 PROCEDURE — 74176 CT ABD & PELVIS W/O CONTRAST: CPT

## 2017-09-22 PROCEDURE — 96366 THER/PROPH/DIAG IV INF ADDON: CPT

## 2017-09-22 PROCEDURE — 96376 TX/PRO/DX INJ SAME DRUG ADON: CPT

## 2017-09-22 PROCEDURE — G0480 DRUG TEST DEF 1-7 CLASSES: HCPCS

## 2017-09-22 PROCEDURE — 80048 BASIC METABOLIC PNL TOTAL CA: CPT

## 2017-09-22 RX ORDER — HYDROCODONE BITARTRATE AND ACETAMINOPHEN 5; 325 MG/1; MG/1
1 TABLET ORAL EVERY 8 HOURS PRN
Qty: 6 TABLET | Refills: 0 | Status: SHIPPED | OUTPATIENT
Start: 2017-09-22 | End: 2017-09-24

## 2017-09-22 RX ORDER — PROMETHAZINE HYDROCHLORIDE 25 MG/1
25 TABLET ORAL EVERY 8 HOURS PRN
Qty: 12 TABLET | Refills: 0 | Status: SHIPPED | OUTPATIENT
Start: 2017-09-22 | End: 2017-09-26

## 2017-09-22 RX ORDER — DEXTROSE MONOHYDRATE 25 G/50ML
25 INJECTION, SOLUTION INTRAVENOUS ONCE
Status: COMPLETED | OUTPATIENT
Start: 2017-09-22 | End: 2017-09-22

## 2017-09-22 RX ORDER — 0.9 % SODIUM CHLORIDE 0.9 %
500 INTRAVENOUS SOLUTION INTRAVENOUS ONCE
Status: COMPLETED | OUTPATIENT
Start: 2017-09-22 | End: 2017-09-22

## 2017-09-22 RX ORDER — DEXTROSE AND SODIUM CHLORIDE 5; .45 G/100ML; G/100ML
INJECTION, SOLUTION INTRAVENOUS CONTINUOUS
Status: DISCONTINUED | OUTPATIENT
Start: 2017-09-22 | End: 2017-09-23 | Stop reason: HOSPADM

## 2017-09-22 RX ORDER — AZITHROMYCIN 250 MG/1
500 TABLET, FILM COATED ORAL ONCE
Status: DISCONTINUED | OUTPATIENT
Start: 2017-09-22 | End: 2017-09-22

## 2017-09-22 RX ORDER — AMOXICILLIN AND CLAVULANATE POTASSIUM 875; 125 MG/1; MG/1
1 TABLET, FILM COATED ORAL ONCE
Status: COMPLETED | OUTPATIENT
Start: 2017-09-22 | End: 2017-09-22

## 2017-09-22 RX ORDER — AMOXICILLIN AND CLAVULANATE POTASSIUM 875; 125 MG/1; MG/1
1 TABLET, FILM COATED ORAL 2 TIMES DAILY
Qty: 14 TABLET | Refills: 0 | Status: SHIPPED | OUTPATIENT
Start: 2017-09-22 | End: 2017-09-29

## 2017-09-22 RX ORDER — ACETAMINOPHEN 500 MG
500 TABLET ORAL EVERY 4 HOURS PRN
Qty: 30 TABLET | Refills: 0 | Status: SHIPPED | OUTPATIENT
Start: 2017-09-22 | End: 2019-02-20

## 2017-09-22 RX ORDER — ONDANSETRON 2 MG/ML
4 INJECTION INTRAMUSCULAR; INTRAVENOUS ONCE
Status: COMPLETED | OUTPATIENT
Start: 2017-09-22 | End: 2017-09-22

## 2017-09-22 RX ORDER — AZITHROMYCIN 250 MG/1
250 TABLET, FILM COATED ORAL DAILY
Qty: 1 PACKET | Refills: 0 | Status: SHIPPED | OUTPATIENT
Start: 2017-09-23 | End: 2017-09-22

## 2017-09-22 RX ADMIN — HYDROMORPHONE HYDROCHLORIDE 0.5 MG: 1 INJECTION, SOLUTION INTRAMUSCULAR; INTRAVENOUS; SUBCUTANEOUS at 07:31

## 2017-09-22 RX ADMIN — HYDROMORPHONE HYDROCHLORIDE 0.5 MG: 1 INJECTION, SOLUTION INTRAMUSCULAR; INTRAVENOUS; SUBCUTANEOUS at 05:49

## 2017-09-22 RX ADMIN — ONDANSETRON 4 MG: 2 INJECTION, SOLUTION INTRAMUSCULAR; INTRAVENOUS at 05:49

## 2017-09-22 RX ADMIN — SODIUM CHLORIDE 500 ML: 9 INJECTION, SOLUTION INTRAVENOUS at 22:16

## 2017-09-22 RX ADMIN — AMOXICILLIN AND CLAVULANATE POTASSIUM 1 TABLET: 875; 125 TABLET, FILM COATED ORAL at 09:13

## 2017-09-22 RX ADMIN — DEXTROSE MONOHYDRATE 25 G: 25 INJECTION, SOLUTION INTRAVENOUS at 05:49

## 2017-09-22 RX ADMIN — HYDROMORPHONE HYDROCHLORIDE 0.5 MG: 1 INJECTION, SOLUTION INTRAMUSCULAR; INTRAVENOUS; SUBCUTANEOUS at 09:01

## 2017-09-22 RX ADMIN — DEXTROSE MONOHYDRATE 25 G: 25 INJECTION, SOLUTION INTRAVENOUS at 07:41

## 2017-09-22 ASSESSMENT — PAIN DESCRIPTION - LOCATION
LOCATION: ABDOMEN
LOCATION: ABDOMEN
LOCATION: ABDOMEN;HEAD
LOCATION: ABDOMEN;HEAD

## 2017-09-22 ASSESSMENT — PAIN DESCRIPTION - PAIN TYPE
TYPE: ACUTE PAIN
TYPE: CHRONIC PAIN
TYPE: ACUTE PAIN;CHRONIC PAIN
TYPE: CHRONIC PAIN;ACUTE PAIN

## 2017-09-22 ASSESSMENT — PAIN SCALES - GENERAL
PAINLEVEL_OUTOF10: 7
PAINLEVEL_OUTOF10: 7
PAINLEVEL_OUTOF10: 9
PAINLEVEL_OUTOF10: 7
PAINLEVEL_OUTOF10: 10
PAINLEVEL_OUTOF10: 6
PAINLEVEL_OUTOF10: 10

## 2017-09-22 ASSESSMENT — ENCOUNTER SYMPTOMS
CONSTIPATION: 0
SHORTNESS OF BREATH: 0
DIARRHEA: 0
NAUSEA: 1
ABDOMINAL PAIN: 1
COUGH: 0
SORE THROAT: 0
EYE PAIN: 0
EYE DISCHARGE: 0
VOMITING: 1

## 2017-09-22 ASSESSMENT — PAIN DESCRIPTION - DESCRIPTORS
DESCRIPTORS: SHARP
DESCRIPTORS: CONSTANT;SHARP
DESCRIPTORS: CONSTANT

## 2017-09-22 NOTE — ED AVS SNAPSHOT
After Visit Summary  (Discharge Instructions)    Medication List for Home    Based on the information you provided to us as well as any changes during this visit, the following is your updated medication list.  Compare this with your prescription bottles at home. If you have any questions or concerns, contact your primary care physician's office. Daily Medication List (This medication list can be shared with any Healthcare provider who is helping you manage your medications)      There are NEW medications for you. START taking them after you leave the hospital     amoxicillin-clavulanate 875-125 MG per tablet   Commonly known as:  AUGMENTIN   Take 1 tablet by mouth 2 times daily for 7 days       HYDROcodone-acetaminophen 5-325 MG per tablet   Commonly known as:  NORCO   Take 1 tablet by mouth every 8 hours as needed for Pain . You told us you were taking these medications at home, but the amount or how often you take this medication has CHANGED     acetaminophen 500 MG tablet   Commonly known as:  APAP EXTRA STRENGTH   Take 1 tablet by mouth every 4 hours as needed for Pain (Take when out of Norco or when the pain is less severe, do not take at the same time as both contain acetaminophen)   What changed:    - medication strength  - how much to take  - when to take this  - reasons to take this  - Another medication with the same name was removed. Continue taking this medication, and follow the directions you see here. bumetanide 2 MG tablet   Commonly known as:  BUMEX   Take 1 tablet by mouth daily   What changed:  additional instructions         These are medications you told us you were taking at home, CONTINUE taking them after you leave the hospital     diltiazem 240 MG extended release capsule   Commonly known as:  CARDIZEM CD   Take 1 capsule by mouth daily       * DULoxetine HCl 40 MG Cpep   Take 40 mg by mouth daily Do not crush or break.  May add contents of capsule to apple juice or apple sauce, but not chocolate. * DULoxetine 60 MG extended release capsule   Commonly known as:  CYMBALTA   Take 1 capsule by mouth daily       esomeprazole Magnesium 40 MG Pack   Commonly known as:  NEXIUM   Take 40 mg by mouth daily       fondaparinux 10 MG/0.8ML Soln injection   Commonly known as:  ARIXTRA   INJECT 0.8MLS INTO THE SKIN DAILY       glucose monitoring kit monitoring kit   1 kit by Does not apply route daily as needed       ibuprofen 800 MG tablet   Commonly known as:  ADVIL;MOTRIN   Take 1 tablet by mouth every 8 hours as needed for Pain       insulin aspart 100 UNIT/ML injection pen   Commonly known as:  NOVOLOG FLEXPEN   Inject 5 Units into the skin 3 times daily (before meals) Hold if sugar below 100       insulin glargine 100 UNIT/ML injection vial   Commonly known as:  LANTUS   Inject 30 Units into the skin nightly       Insulin Pen Needle 31G X 6 MM Misc   1 each by Does not apply route 2 times daily       Lancets Misc   1 each by Other route 3 times daily       lidocaine 5 % ointment   Commonly known as:  XYLOCAINE   Apply topically as needed. lisinopril 5 MG tablet   Commonly known as:  PRINIVIL;ZESTRIL   Take 1 tablet by mouth Daily with lunch       * LORazepam 1 MG tablet   Commonly known as:  ATIVAN   Take 1 mg by mouth 2 times daily as needed for Anxiety       * LORazepam 2 MG tablet   Commonly known as:  ATIVAN   Take 2 mg by mouth nightly as needed for Anxiety       magnesium oxide 400 (241.3 Mg) MG Tabs tablet   Commonly known as:  MAG-OX   Take 1 tablet by mouth 2 times daily       Menthol (Topical Analgesic) 5 % Pads   Apply to the chest wall, as needed for pain, daily.        * ondansetron 4 MG disintegrating tablet   Commonly known as:  ZOFRAN ODT   Take 1 tablet by mouth every 8 hours as needed for Nausea       * ondansetron 4 MG disintegrating tablet   Commonly known as:  ZOFRAN ODT Take 1 tablet by mouth every 8 hours as needed for Nausea       ondansetron 4 MG tablet   Commonly known as:  ZOFRAN   Take 1 tablet by mouth every 8 hours as needed for Nausea       promethazine 25 MG tablet   Commonly known as:  PHENERGAN   Take 1 tablet by mouth every 8 hours as needed for Nausea       silver sulfADIAZINE 1 % cream   Commonly known as:  SILVADENE   Apply topically daily. tiZANidine 4 MG tablet   Commonly known as:  ZANAFLEX   Take 1 tablet by mouth every 6 hours as needed (pain/spasm)       traMADol 50 MG tablet   Commonly known as:  ULTRAM   Take 1 tablet by mouth every 6 hours as needed for Pain       vitamin D 1000 UNIT Tabs tablet   Commonly known as:  CHOLECALCIFEROL   Take 10,000 Units by mouth daily None for about 1 week, states not given while in hospital       zolpidem 5 MG tablet   Commonly known as:  AMBIEN   Take 5 mg by mouth nightly as needed for Sleep       * Notice: This list has 6 medication(s) that are the same as other medications prescribed for you. Read the directions carefully, and ask your doctor or other care provider to review them with you.       These are the medications you have told us you were taking at home, STOP taking them after you leave the hospital     butalbital-apap-caffeine -40 MG Caps   Commonly known as:  CAPACET       oxyCODONE-acetaminophen 5-325 MG per tablet   Commonly known as:  PERCOCET       penicillin v potassium 500 MG tablet   Commonly known as:  VEETID            Where to Get Your Medications      You can get these medications from any pharmacy     Bring a paper prescription for each of these medications     acetaminophen 500 MG tablet    amoxicillin-clavulanate 875-125 MG per tablet    HYDROcodone-acetaminophen 5-325 MG per tablet               Allergies as of 9/22/2017        Reactions    Betadine [Povidone Iodine]     Celexa [Citalopram Hydrobromide]     Lasix [Furosemide]     Macrobid [Nitrofurantoin Monohyd Macro] uncontrolled fevers, uncontrolled vomiting, change in symptoms, worsening of symptoms, or ANY other concerns. Pre-hypertension/Hypertension: You have been informed that you may have pre-hypertension or Hypertension based on a blood pressure reading in the emergency department. I recommend you call the primary care provider listed on your discharge instructions or a physician of your choice this week to arrange follow up for further evaluation of possible pre-hypertension or Hypertension. Pneumonia: Care Instructions  Your Care Instructions    Pneumonia is an infection of the lungs. Most cases are caused by infections from bacteria or viruses. Pneumonia may be mild or very severe. If it is caused by bacteria, you will be treated with antibiotics. It may take a few weeks to a few months to recover fully from pneumonia, depending on how sick you were and whether your overall health is good. Follow-up care is a key part of your treatment and safety. Be sure to make and go to all appointments, and call your doctor if you are having problems. Its also a good idea to know your test results and keep a list of the medicines you take. How can you care for yourself at home? · Take your antibiotics exactly as directed. Do not stop taking the medicine just because you are feeling better. You need to take the full course of antibiotics. · Take your medicines exactly as prescribed. Call your doctor if you think you are having a problem with your medicine. · Get plenty of rest and sleep. You may feel weak and tired for a while, but your energy level will improve with time. · To prevent dehydration, drink plenty of fluids, enough so that your urine is light yellow or clear like water. Choose water and other caffeine-free clear liquids until you feel better. If you have kidney, heart, or liver disease and have to limit fluids, talk with your doctor before you increase the amount of fluids you drink.

## 2017-09-22 NOTE — ED PROVIDER NOTES
[address]  ED  eMERGENCY dEPARTMENT eNCOUnter   Attending Attestation     Pt Name: Carlos Cardona  MRN: [id number]  [place] 1980  Date of evaluation: 9/22/17       Carlos Cardona is a 44 y.o. female who presents with Hypoglycemia (began yesterday afternoon) and Nausea      History: Pt presents with hypoglycemia in 45s. Pt statesshe has been having this since yesterday afternoon but has been unable to keep any food or drink down since then due to her hypoglycemia and nausea. Pt has sweating with this but no other complaints. Exam: Physical Exam   Constitutional: She is oriented to person, place, and time and well-developed, well-nourished, and in no distress. HENT:   Head: Normocephalic and atraumatic. Eyes: EOM are normal. Pupils are equal, round, and reactive to light. Right eye exhibits no discharge. Left eye exhibits no discharge. Neck: Normal range of motion. No JVD present. No tracheal deviation present. Cardiovascular: Normal rate, regular rhythm, normal heart sounds and intact distal pulses. Exam reveals no gallop and no friction rub. No murmur heard. Pulmonary/Chest: Effort normal and breath sounds normal. No respiratory distress. She has no wheezes. She has no rales. Abdominal: Soft. Bowel sounds are normal. She exhibits no distension and no mass. There is tenderness (diffuse discomfort on exam. no point tenderness. no rigidity or mass. ). There is no rebound and no guarding. Musculoskeletal: Normal range of motion. She exhibits no edema or tenderness. Neurological: She is alert and oriented to person, place, and time. No cranial nerve deficit. Coordination normal. GCS score is 15. Skin: Skin is warm and dry. No rash noted. She is not diaphoretic. No erythema. Psychiatric: Mood and affect normal.     Will give dextrose. Will see if she can tolerate PO. Will observe here. If improved consider discharge vs admission for glucose monitoring/dextrose drip.    I performed a history and physical examination of the patient and discussed management with the resident. I reviewed the residents note and agree with the documented findings and plan of care. Any areas of disagreement are noted on the chart. I was personally present for the key portions of any procedures. I have documented in the chart those procedures where I was not present during the key portions. I have personally reviewed all images and agree with the resident's interpretation. I have reviewed the emergency nurses triage note. I agree with the chief complaint, past medical history, past surgical history, allergies, medications, social and family history as documented unless otherwise noted below. Documentation of the HPI, Physical Exam and Medical Decision Making performed by medical students or scribes is based on my personal performance of the HPI, PE and MDM. For Phys Assistant/ Nurse Practitioner cases/documentation I have had a face to face evaluation of this patient and have completed at least one if not all key elements of the E/M (history, physical exam, and MDM). Additional findings are as noted. For APC cases I have personally evaluated and examined the patient in conjunction with the APC and agree with the treatment plan and disposition of the patient as recorded by the APC.     Abel Mahmood MD  Attending Emergency  Physician         Luis Alba MD  09/22/17 [id number]

## 2017-09-22 NOTE — ED PROVIDER NOTES
culture positive; Overdose of drug; Pernicious anemia; Primary hypercoagulable state ([facility] [address] Suicide attempt [facility]); SVT (supraventricular tachycardia) ([facility] [address] and Type II or unspecified type diabetes mellitus without mention of complication, not stated as uncontrolled. has a past surgical history that includes Bariatric Surgery (2013); Cholecystectomy; Hysterectomy; Liver Resection; hernia repair; Tonsillectomy; Endoscopy, colon, diagnostic; Abdominal hernia repair (2014); Abdominal hernia repair (11/3/14); Bariatric Surgery (2013); Dilation and curettage of uterus (2005); and Finger amputation (Left, 02/09/2015). Social History     Social History    Marital status:      Spouse name: N/A    Number of children: N/A    Years of education: N/A     Occupational History    Not on file. Social History Main Topics    Smoking status: Never Smoker    Smokeless tobacco: Never Used    Alcohol use No      Comment: Last alcohol use was in 12/2015    Drug use: No      Comment: Pt stole her mom's Benzo 1 yr ago. Pt said she took more than prescribed dose of Valium once in late 90's.  Sexual activity: Not Currently     Other Topics Concern    Not on file     Social History Narrative    ** Merged History Encounter **            Family History   Problem Relation Age of Onset    Depression Mother     High Blood Pressure Mother     High Cholesterol Mother     Diabetes Father     Heart Disease Father     Kidney Disease Father     High Blood Pressure Father     High Cholesterol Father        Allergies:  Betadine [povidone iodine]; Celexa [citalopram hydrobromide]; Lasix [furosemide]; Macrobid [nitrofurantoin monohyd macro]; Macrobid [nitrofurantoin]; Other; Betadine [povidone iodine]; Lithium; Paxil [paroxetine hcl]; and Tegretol [carbamazepine]    Home Medications:  Prior to Admission medications    Medication Sig Start Date End Date Taking?  Authorizing Provider   HYDROcodoneacetaminophen [facility]) Cindy Peraza MD   DULoxetine (CYMBALTA) 60 MG extended release capsule Take 1 capsule by mouth daily 4/10/17   Cindy Peraza MD   insulin glargine (LANTUS) 100 UNIT/ML injection vial Inject 30 Units into the skin nightly 4/10/17 5/10/17  Cindy Peraza MD   lisinopril (PRINIVIL;ZESTRIL) 5 MG tablet Take 1 tablet by mouth Daily with lunch 4/11/17   Cindy Peraza MD   magnesium oxide (MAG-OX) 400 (241.3 MG) MG TABS tablet Take 1 tablet by mouth 2 times daily 4/10/17   Cindy Peraza MD   silver sulfADIAZINE (SILVADENE) 1 % cream Apply topically daily. 3/9/17   Franklin Pereira PA-C   Menthol, Topical Analgesic, 5 % PADS Apply to the chest wall, as needed for pain, daily. 1/9/17   Juan Luis Andrew PA-C   vitamin D (CHOLECALCIFEROL) 1000 UNIT TABS tablet Take 10,000 Units by mouth daily None for about 1 week, states not given while in hospital    Historical Provider, MD   fondaparinux (ARIXTRA) 10 MG/0.8ML SOLN injection INJECT 0.8MLS INTO THE SKIN DAILY 10/5/16   Rafael Larios MD   lidocaine (XYLOCAINE) 5 % ointment Apply topically as needed. 7/7/16   Rafael Larios MD   insulin aspart (NOVOLOG FLEXPEN) 100 UNIT/ML injection pen Inject 5 Units into the skin 3 times daily (before meals) Hold if sugar below 100 3/4/16   Karla Galeana MD   Lancets MISC 1 each by Other route 3 times daily 3/4/16   Karla Galeana MD   bumetanide (BUMEX) 2 MG tablet Take 1 tablet by mouth daily  Patient taking differently: Take 2 mg by mouth daily None for more than 1 week 3/4/16   Karla Galeana MD   Insulin Pen Needle 31G X 6 MM MISC 1 each by Does not apply route 2 times daily 12/8/15   Rafael Larios MD   glucose monitoring kit (FREESTYLE) monitoring kit 1 kit by Does not apply route daily as needed 12/1/15   Rafael Larios MD   DULoxetine HCl 40 MG CPEP Take 40 mg by mouth daily Do not crush or break. May add contents of capsule to apple juice or apple sauce, but not chocolate.   Patient not taking: Reported on 4/26/2017 9/29/15 10/29/15  Amina Reagan MD       REVIEW OF SYSTEMS    (2-9 systems for level 4, 10 or more for level 5)      Review of Systems   Constitutional: Positive for diaphoresis. Negative for chills and fever. HENT: Negative for congestion and sore throat. Eyes: Negative for pain and discharge. Respiratory: Negative for cough and shortness of breath. Cardiovascular: Negative for chest pain and leg swelling. Gastrointestinal: Positive for abdominal pain, nausea and vomiting. Negative for constipation and diarrhea. Endocrine: Negative for polydipsia and polyuria. Genitourinary: Negative for dysuria, flank pain, hematuria, vaginal bleeding, vaginal discharge and vaginal pain. Musculoskeletal: Negative for neck pain and neck stiffness. Skin: Negative for pallor and rash. Allergic/Immunologic: Negative for environmental allergies and food allergies. Neurological: Negative for numbness and headaches. Hematological: Negative for adenopathy. Does not bruise/bleed easily. Psychiatric/Behavioral: Negative for hallucinations and suicidal ideas. PHYSICAL EXAM   (up to 7 for level 4, 8 or more for level 5)      INITIAL VITALS:   /64  Pulse 100  Temp 97.2 °F (36.2 °C) (Oral)   Resp 18  Ht 5' 9\" (1.753 m)  Wt 290 lb (131.5 kg)  SpO2 100%  BMI 42.83 kg/m2    Physical Exam   Constitutional: She is oriented to person, place, and time. She appears well-developed and well-nourished. HENT:   Head: Normocephalic and atraumatic. Mouth/Throat: Oropharynx is clear and moist.   Eyes: Conjunctivae are normal. Pupils are equal, round, and reactive to light. Neck: Normal range of motion. Neck supple. Cardiovascular: Normal rate and regular rhythm. Exam reveals no gallop and no friction rub. No murmur heard. Pulmonary/Chest: Effort normal and breath sounds normal. No respiratory distress. She has no wheezes. She has no rales. Abdominal: Soft.  Bowel [place] [address]  [phone]    Schedule an appointment as soon as possible for a visit        DISCHARGE MEDICATIONS:  Discharge Medication List as of 9/22/2017  9:07 AM      START taking these medications    Details   HYDROcodone-acetaminophen (NORCO) 5-325 MG per tablet Take 1 tablet by mouth every 8 hours as needed for Pain ., Disp-6 tablet, R-0Print      amoxicillin-clavulanate (AUGMENTIN) 875-125 MG per tablet Take 1 tablet by mouth 2 times daily for 7 days, Disp-14 tablet, R-0Print             Suzan Thomas DO  Emergency Medicine Resident    (Please note that portions of this note were completed with a voice recognition program.  Efforts were made to edit the dictations but occasionally words are mis-transcribed.)       Suzan Thomas DO  Resident  09/22/17 [id number]

## 2017-09-22 NOTE — ED PROVIDER NOTES
currently eating boxed lunch and has tolerated a few glasses of orange juice without any further nausea or vomiting. Did have to give another dose of dextrose in addition to the food and juice as glucose again dropped to Bernadette Willardin [address], DO 09/22 0806     The patient stayed here long enough to have multiple blood glucose checks that were normal and she ate an entire box lunch as her nausea resolved as well as plenty of juice all in addition to the dextrose. She requested to be discharged. CT showing no acute event in the abdomen however incidental finding of lung infiltrate. On reassessment feels much better. We will discharge her on treatment for community-acquired pneumonia. She was encouraged to return back immediately should she have further episodes of hypoglycemia or if she develops shortness of breath, chest pain, return of alleviated symptoms or other issues or concerns. Also encouraged close follow-up with her PCP. OUTSTANDING TASKS / RECOMMENDATIONS:    1. Labs, CT, reassessment     FINAL IMPRESSION:     1.  Pneumonia of left lower lobe due to infectious organism        DISPOSITION:         DISPOSITION:  []  Discharge   []  Transfer -    []  Admission -     []  Against Medical Advice   []  Eloped   FOLLOW-UP: Yahir Skinner, [address] [place] [address]  [phone]    Schedule an appointment as soon as possible for a visit       DISCHARGE MEDICATIONS: Discharge Medication List as of 9/22/2017  9:07 AM      START taking these medications    Details   HYDROcodone-acetaminophen (NORCO) 5-325 MG per tablet Take 1 tablet by mouth every 8 hours as needed for Pain ., Disp-6 tablet, R-0Print      amoxicillin-clavulanate (AUGMENTIN) 875-125 MG per tablet Take 1 tablet by mouth 2 times daily for 7 days, Disp-14 tablet, R-0Print                Karoline Romberg, DO  Emergency Medicine Resident  [address] Karoline Romberg, [place]  Resident  09/25/17 [id number]       Karoline Romberg,

## 2017-09-24 ENCOUNTER — HOSPITAL ENCOUNTER (INPATIENT)
Age: 37
LOS: 1 days | Discharge: LEFT BEFORE DISCHARGE | DRG: 639 | End: 2017-09-25
Attending: EMERGENCY MEDICINE | Admitting: INTERNAL MEDICINE
Payer: MEDICARE

## 2017-09-24 DIAGNOSIS — E16.2 HYPOGLYCEMIA: Primary | ICD-10-CM

## 2017-09-24 LAB
CHP ED QC CHECK: YES
GLUCOSE BLD-MCNC: 23 MG/DL

## 2017-09-24 PROCEDURE — 82947 ASSAY GLUCOSE BLOOD QUANT: CPT

## 2017-09-24 PROCEDURE — 99285 EMERGENCY DEPT VISIT HI MDM: CPT

## 2017-09-25 ENCOUNTER — APPOINTMENT (OUTPATIENT)
Dept: GENERAL RADIOLOGY | Age: 37
DRG: 639 | End: 2017-09-25
Payer: MEDICARE

## 2017-09-25 VITALS
TEMPERATURE: 97.5 F | WEIGHT: 286.6 LBS | BODY MASS INDEX: 42.45 KG/M2 | OXYGEN SATURATION: 98 % | SYSTOLIC BLOOD PRESSURE: 112 MMHG | RESPIRATION RATE: 16 BRPM | HEIGHT: 69 IN | HEART RATE: 82 BPM | DIASTOLIC BLOOD PRESSURE: 46 MMHG

## 2017-09-25 PROBLEM — E16.2 HYPOGLYCEMIA: Status: ACTIVE | Noted: 2017-09-25

## 2017-09-25 LAB
ABSOLUTE EOS #: 0.2 K/UL (ref 0–0.4)
ABSOLUTE EOS #: 0.2 K/UL (ref 0–0.4)
ABSOLUTE LYMPH #: 1.9 K/UL (ref 1–4.8)
ABSOLUTE LYMPH #: 3.3 K/UL (ref 1–4.8)
ABSOLUTE MONO #: 0.7 K/UL (ref 0.1–1.2)
ABSOLUTE MONO #: 0.8 K/UL (ref 0.1–1.2)
ALBUMIN SERPL-MCNC: 3 G/DL (ref 3.5–5.2)
ALBUMIN SERPL-MCNC: 3.3 G/DL (ref 3.5–5.2)
ALBUMIN/GLOBULIN RATIO: 0.9 (ref 1–2.5)
ALBUMIN/GLOBULIN RATIO: 0.9 (ref 1–2.5)
ALLEN TEST: ABNORMAL
ALP BLD-CCNC: 86 U/L (ref 35–104)
ALP BLD-CCNC: 90 U/L (ref 35–104)
ALT SERPL-CCNC: 14 U/L (ref 5–33)
ALT SERPL-CCNC: 19 U/L (ref 5–33)
ANION GAP SERPL CALCULATED.3IONS-SCNC: 14 MMOL/L (ref 9–17)
ANION GAP SERPL CALCULATED.3IONS-SCNC: 15 MMOL/L (ref 9–17)
ANION GAP: 12 MMOL/L (ref 7–16)
AST SERPL-CCNC: 17 U/L
AST SERPL-CCNC: 34 U/L
BASOPHILS # BLD: 0 %
BASOPHILS # BLD: 1 %
BASOPHILS ABSOLUTE: 0 K/UL (ref 0–0.2)
BASOPHILS ABSOLUTE: 0 K/UL (ref 0–0.2)
BILIRUB SERPL-MCNC: 0.17 MG/DL (ref 0.3–1.2)
BILIRUB SERPL-MCNC: 0.2 MG/DL (ref 0.3–1.2)
BUN BLDV-MCNC: 11 MG/DL (ref 6–20)
BUN BLDV-MCNC: 13 MG/DL (ref 6–20)
BUN/CREAT BLD: ABNORMAL (ref 9–20)
BUN/CREAT BLD: ABNORMAL (ref 9–20)
CALCIUM SERPL-MCNC: 8.8 MG/DL (ref 8.6–10.4)
CALCIUM SERPL-MCNC: 8.9 MG/DL (ref 8.6–10.4)
CHLORIDE BLD-SCNC: 102 MMOL/L (ref 98–107)
CHLORIDE BLD-SCNC: 105 MMOL/L (ref 98–107)
CHP ED QC CHECK: YES
CO2: 19 MMOL/L (ref 20–31)
CO2: 21 MMOL/L (ref 20–31)
CREAT SERPL-MCNC: 0.46 MG/DL (ref 0.5–0.9)
CREAT SERPL-MCNC: 0.47 MG/DL (ref 0.5–0.9)
DIFFERENTIAL TYPE: NORMAL
DIFFERENTIAL TYPE: NORMAL
EKG ATRIAL RATE: 76 BPM
EKG P AXIS: 57 DEGREES
EKG P-R INTERVAL: 150 MS
EKG Q-T INTERVAL: 428 MS
EKG QRS DURATION: 102 MS
EKG QTC CALCULATION (BAZETT): 481 MS
EKG R AXIS: 36 DEGREES
EKG T AXIS: 16 DEGREES
EKG VENTRICULAR RATE: 76 BPM
EOSINOPHILS RELATIVE PERCENT: 2 %
EOSINOPHILS RELATIVE PERCENT: 2 %
FIO2: ABNORMAL
GFR AFRICAN AMERICAN: >60 ML/MIN
GFR AFRICAN AMERICAN: >60 ML/MIN
GFR NON-AFRICAN AMERICAN: >60 ML/MIN
GFR SERPL CREATININE-BSD FRML MDRD: >60 ML/MIN
GFR SERPL CREATININE-BSD FRML MDRD: ABNORMAL ML/MIN/{1.73_M2}
GFR SERPL CREATININE-BSD FRML MDRD: NORMAL ML/MIN/{1.73_M2}
GLUCOSE BLD-MCNC: 106 MG/DL (ref 65–105)
GLUCOSE BLD-MCNC: 106 MG/DL (ref 65–105)
GLUCOSE BLD-MCNC: 126 MG/DL (ref 70–99)
GLUCOSE BLD-MCNC: 160 MG/DL (ref 65–105)
GLUCOSE BLD-MCNC: 161 MG/DL
GLUCOSE BLD-MCNC: 170 MG/DL (ref 65–105)
GLUCOSE BLD-MCNC: 22 MG/DL (ref 65–105)
GLUCOSE BLD-MCNC: 41 MG/DL (ref 65–105)
GLUCOSE BLD-MCNC: 50 MG/DL (ref 65–105)
GLUCOSE BLD-MCNC: 57 MG/DL (ref 74–100)
GLUCOSE BLD-MCNC: 58 MG/DL (ref 70–99)
GLUCOSE BLD-MCNC: 59 MG/DL (ref 65–105)
GLUCOSE BLD-MCNC: 66 MG/DL
GLUCOSE BLD-MCNC: 66 MG/DL (ref 65–105)
GLUCOSE BLD-MCNC: 66 MG/DL (ref 65–105)
GLUCOSE BLD-MCNC: 91 MG/DL (ref 65–105)
GLUCOSE BLD-MCNC: 95 MG/DL (ref 65–105)
GLUCOSE BLD-MCNC: 96 MG/DL
GLUCOSE BLD-MCNC: 97 MG/DL (ref 65–105)
GLUCOSE BLD-MCNC: 98 MG/DL (ref 65–105)
HCO3 VENOUS: 26.2 MMOL/L (ref 22–29)
HCT VFR BLD CALC: 38.8 % (ref 36–46)
HCT VFR BLD CALC: 39.7 % (ref 36–46)
HEMOGLOBIN: 12.9 G/DL (ref 12–16)
HEMOGLOBIN: 13 G/DL (ref 12–16)
LACTIC ACID, WHOLE BLOOD: 2.1 MMOL/L (ref 0.7–2.1)
LIPASE: 11 U/L (ref 13–60)
LYMPHOCYTES # BLD: 25 %
LYMPHOCYTES # BLD: 33 %
MCH RBC QN AUTO: 30.1 PG (ref 26–34)
MCH RBC QN AUTO: 30.3 PG (ref 26–34)
MCHC RBC AUTO-ENTMCNC: 32.8 G/DL (ref 31–37)
MCHC RBC AUTO-ENTMCNC: 33.1 G/DL (ref 31–37)
MCV RBC AUTO: 91.6 FL (ref 80–100)
MCV RBC AUTO: 91.9 FL (ref 80–100)
MODE: ABNORMAL
MONOCYTES # BLD: 8 %
MONOCYTES # BLD: 9 %
NEGATIVE BASE EXCESS, VEN: ABNORMAL (ref 0–2)
O2 DEVICE/FLOW/%: ABNORMAL
O2 SAT, VEN: 94 % (ref 60–85)
PATIENT TEMP: ABNORMAL
PCO2, VEN: 47 MM HG (ref 41–51)
PDW BLD-RTO: 13.8 % (ref 12.5–15.4)
PDW BLD-RTO: 13.9 % (ref 12.5–15.4)
PH VENOUS: 7.35 (ref 7.32–7.43)
PLATELET # BLD: 224 K/UL (ref 140–450)
PLATELET # BLD: 249 K/UL (ref 140–450)
PLATELET ESTIMATE: NORMAL
PLATELET ESTIMATE: NORMAL
PMV BLD AUTO: 10.6 FL (ref 6–12)
PMV BLD AUTO: 11.1 FL (ref 6–12)
PO2, VEN: 75.1 MM HG (ref 30–50)
POC CHLORIDE: 109 MMOL/L (ref 98–107)
POC CREATININE: 0.65 MG/DL (ref 0.51–1.19)
POC HEMATOCRIT: 37 % (ref 36–46)
POC HEMOGLOBIN: 12.6 G/DL (ref 12–16)
POC IONIZED CALCIUM: 1.3 MMOL/L (ref 1.15–1.33)
POC LACTIC ACID: 2.15 MMOL/L (ref 0.56–1.39)
POC PCO2 TEMP: ABNORMAL MM HG
POC PH TEMP: ABNORMAL
POC PO2 TEMP: ABNORMAL MM HG
POC POTASSIUM: 3.8 MMOL/L (ref 3.5–4.5)
POC SODIUM: 147 MMOL/L (ref 138–146)
POSITIVE BASE EXCESS, VEN: 0 (ref 0–3)
POTASSIUM SERPL-SCNC: 4.9 MMOL/L (ref 3.7–5.3)
POTASSIUM SERPL-SCNC: 5 MMOL/L (ref 3.7–5.3)
RBC # BLD: 4.24 M/UL (ref 4–5.2)
RBC # BLD: 4.32 M/UL (ref 4–5.2)
RBC # BLD: NORMAL 10*6/UL
RBC # BLD: NORMAL 10*6/UL
SAMPLE SITE: ABNORMAL
SEG NEUTROPHILS: 56 %
SEG NEUTROPHILS: 64 %
SEGMENTED NEUTROPHILS ABSOLUTE COUNT: 5 K/UL (ref 1.8–7.7)
SEGMENTED NEUTROPHILS ABSOLUTE COUNT: 5.6 K/UL (ref 1.8–7.7)
SODIUM BLD-SCNC: 138 MMOL/L (ref 135–144)
SODIUM BLD-SCNC: 138 MMOL/L (ref 135–144)
TOTAL CO2, VENOUS: 28 MMOL/L (ref 23–30)
TOTAL PROTEIN: 6.5 G/DL (ref 6.4–8.3)
TOTAL PROTEIN: 6.8 G/DL (ref 6.4–8.3)
TROPONIN INTERP: NORMAL
TROPONIN T: <0.03 NG/ML
WBC # BLD: 7.8 K/UL (ref 3.5–11)
WBC # BLD: 9.9 K/UL (ref 3.5–11)
WBC # BLD: NORMAL 10*3/UL
WBC # BLD: NORMAL 10*3/UL

## 2017-09-25 PROCEDURE — 6370000000 HC RX 637 (ALT 250 FOR IP): Performed by: INTERNAL MEDICINE

## 2017-09-25 PROCEDURE — 96374 THER/PROPH/DIAG INJ IV PUSH: CPT

## 2017-09-25 PROCEDURE — 83690 ASSAY OF LIPASE: CPT

## 2017-09-25 PROCEDURE — 6360000002 HC RX W HCPCS: Performed by: EMERGENCY MEDICINE

## 2017-09-25 PROCEDURE — 96372 THER/PROPH/DIAG INJ SC/IM: CPT

## 2017-09-25 PROCEDURE — 84484 ASSAY OF TROPONIN QUANT: CPT

## 2017-09-25 PROCEDURE — 2580000003 HC RX 258: Performed by: INTERNAL MEDICINE

## 2017-09-25 PROCEDURE — 83605 ASSAY OF LACTIC ACID: CPT

## 2017-09-25 PROCEDURE — 74022 RADEX COMPL AQT ABD SERIES: CPT

## 2017-09-25 PROCEDURE — 2580000003 HC RX 258

## 2017-09-25 PROCEDURE — 80053 COMPREHEN METABOLIC PANEL: CPT

## 2017-09-25 PROCEDURE — 99291 CRITICAL CARE FIRST HOUR: CPT | Performed by: INTERNAL MEDICINE

## 2017-09-25 PROCEDURE — 96375 TX/PRO/DX INJ NEW DRUG ADDON: CPT

## 2017-09-25 PROCEDURE — 6370000000 HC RX 637 (ALT 250 FOR IP): Performed by: EMERGENCY MEDICINE

## 2017-09-25 PROCEDURE — 94762 N-INVAS EAR/PLS OXIMTRY CONT: CPT

## 2017-09-25 PROCEDURE — 2000000000 HC ICU R&B

## 2017-09-25 PROCEDURE — 82947 ASSAY GLUCOSE BLOOD QUANT: CPT

## 2017-09-25 PROCEDURE — 82435 ASSAY OF BLOOD CHLORIDE: CPT

## 2017-09-25 PROCEDURE — 85025 COMPLETE CBC W/AUTO DIFF WBC: CPT

## 2017-09-25 PROCEDURE — 82330 ASSAY OF CALCIUM: CPT

## 2017-09-25 PROCEDURE — 93005 ELECTROCARDIOGRAM TRACING: CPT

## 2017-09-25 PROCEDURE — 82803 BLOOD GASES ANY COMBINATION: CPT

## 2017-09-25 PROCEDURE — 85014 HEMATOCRIT: CPT

## 2017-09-25 PROCEDURE — 84295 ASSAY OF SERUM SODIUM: CPT

## 2017-09-25 PROCEDURE — 84132 ASSAY OF SERUM POTASSIUM: CPT

## 2017-09-25 PROCEDURE — 82565 ASSAY OF CREATININE: CPT

## 2017-09-25 PROCEDURE — 2580000003 HC RX 258: Performed by: EMERGENCY MEDICINE

## 2017-09-25 RX ORDER — DEXTROSE AND SODIUM CHLORIDE 5; .9 G/100ML; G/100ML
INJECTION, SOLUTION INTRAVENOUS
Status: COMPLETED
Start: 2017-09-25 | End: 2017-09-25

## 2017-09-25 RX ORDER — DULOXETIN HYDROCHLORIDE 30 MG/1
60 CAPSULE, DELAYED RELEASE ORAL DAILY
Status: DISCONTINUED | OUTPATIENT
Start: 2017-09-25 | End: 2017-09-25

## 2017-09-25 RX ORDER — LISINOPRIL 5 MG/1
5 TABLET ORAL
Status: DISCONTINUED | OUTPATIENT
Start: 2017-09-25 | End: 2017-09-25

## 2017-09-25 RX ORDER — SODIUM CHLORIDE 0.9 % (FLUSH) 0.9 %
10 SYRINGE (ML) INJECTION PRN
Status: CANCELLED | OUTPATIENT
Start: 2017-09-25

## 2017-09-25 RX ORDER — MORPHINE SULFATE 2 MG/ML
2 INJECTION, SOLUTION INTRAMUSCULAR; INTRAVENOUS
Status: CANCELLED | OUTPATIENT
Start: 2017-09-25

## 2017-09-25 RX ORDER — DEXTROSE MONOHYDRATE 100 MG/ML
INJECTION, SOLUTION INTRAVENOUS CONTINUOUS
Status: DISCONTINUED | OUTPATIENT
Start: 2017-09-25 | End: 2017-09-25 | Stop reason: SDUPTHER

## 2017-09-25 RX ORDER — DEXTROSE MONOHYDRATE 100 MG/ML
INJECTION, SOLUTION INTRAVENOUS CONTINUOUS
Status: DISCONTINUED | OUTPATIENT
Start: 2017-09-25 | End: 2017-09-25

## 2017-09-25 RX ORDER — OCTREOTIDE ACETATE 100 UG/ML
100 INJECTION, SOLUTION INTRAVENOUS; SUBCUTANEOUS ONCE
Status: COMPLETED | OUTPATIENT
Start: 2017-09-25 | End: 2017-09-25

## 2017-09-25 RX ORDER — DEXTROSE MONOHYDRATE 25 G/50ML
25 INJECTION, SOLUTION INTRAVENOUS ONCE
Status: COMPLETED | OUTPATIENT
Start: 2017-09-25 | End: 2017-09-25

## 2017-09-25 RX ORDER — FENTANYL CITRATE 50 UG/ML
50 INJECTION, SOLUTION INTRAMUSCULAR; INTRAVENOUS ONCE
Status: DISCONTINUED | OUTPATIENT
Start: 2017-09-25 | End: 2017-09-25

## 2017-09-25 RX ORDER — ZOLPIDEM TARTRATE 5 MG/1
5 TABLET ORAL NIGHTLY PRN
Status: DISCONTINUED | OUTPATIENT
Start: 2017-09-25 | End: 2017-09-25

## 2017-09-25 RX ORDER — POTASSIUM CHLORIDE 20 MEQ/1
40 TABLET, EXTENDED RELEASE ORAL PRN
Status: CANCELLED | OUTPATIENT
Start: 2017-09-25

## 2017-09-25 RX ORDER — DEXTROSE MONOHYDRATE 25 G/50ML
12.5 INJECTION, SOLUTION INTRAVENOUS PRN
Status: CANCELLED | OUTPATIENT
Start: 2017-09-25

## 2017-09-25 RX ORDER — ONDANSETRON 2 MG/ML
4 INJECTION INTRAMUSCULAR; INTRAVENOUS EVERY 6 HOURS PRN
Status: CANCELLED | OUTPATIENT
Start: 2017-09-25

## 2017-09-25 RX ORDER — TRAMADOL HYDROCHLORIDE 50 MG/1
50 TABLET ORAL EVERY 6 HOURS PRN
Status: DISCONTINUED | OUTPATIENT
Start: 2017-09-25 | End: 2017-09-25 | Stop reason: HOSPADM

## 2017-09-25 RX ORDER — KETOROLAC TROMETHAMINE 30 MG/ML
30 INJECTION, SOLUTION INTRAMUSCULAR; INTRAVENOUS ONCE
Status: COMPLETED | OUTPATIENT
Start: 2017-09-25 | End: 2017-09-25

## 2017-09-25 RX ORDER — ONDANSETRON 2 MG/ML
4 INJECTION INTRAMUSCULAR; INTRAVENOUS ONCE
Status: COMPLETED | OUTPATIENT
Start: 2017-09-25 | End: 2017-09-25

## 2017-09-25 RX ORDER — BISACODYL 10 MG
10 SUPPOSITORY, RECTAL RECTAL DAILY PRN
Status: CANCELLED | OUTPATIENT
Start: 2017-09-25

## 2017-09-25 RX ORDER — DEXTROSE MONOHYDRATE 25 G/50ML
25 INJECTION, SOLUTION INTRAVENOUS ONCE
Status: DISCONTINUED | OUTPATIENT
Start: 2017-09-25 | End: 2017-09-25

## 2017-09-25 RX ORDER — DEXTROSE MONOHYDRATE 100 MG/ML
125 INJECTION, SOLUTION INTRAVENOUS CONTINUOUS
Status: DISCONTINUED | OUTPATIENT
Start: 2017-09-25 | End: 2017-09-25

## 2017-09-25 RX ORDER — DEXTROSE AND SODIUM CHLORIDE 5; .45 G/100ML; G/100ML
INJECTION, SOLUTION INTRAVENOUS CONTINUOUS
Status: DISCONTINUED | OUTPATIENT
Start: 2017-09-25 | End: 2017-09-25

## 2017-09-25 RX ORDER — MAGNESIUM SULFATE 1 G/100ML
1 INJECTION INTRAVENOUS PRN
Status: DISCONTINUED | OUTPATIENT
Start: 2017-09-25 | End: 2017-09-25 | Stop reason: HOSPADM

## 2017-09-25 RX ORDER — LORAZEPAM 1 MG/1
1 TABLET ORAL 2 TIMES DAILY PRN
Status: DISCONTINUED | OUTPATIENT
Start: 2017-09-25 | End: 2017-09-25 | Stop reason: HOSPADM

## 2017-09-25 RX ORDER — FONDAPARINUX SODIUM 7.5 MG/.6ML
10 INJECTION SUBCUTANEOUS DAILY
Status: DISCONTINUED | OUTPATIENT
Start: 2017-09-25 | End: 2017-09-25 | Stop reason: SDUPTHER

## 2017-09-25 RX ORDER — MORPHINE SULFATE 4 MG/ML
4 INJECTION, SOLUTION INTRAMUSCULAR; INTRAVENOUS
Status: CANCELLED | OUTPATIENT
Start: 2017-09-25

## 2017-09-25 RX ORDER — DEXTROSE MONOHYDRATE 25 G/50ML
INJECTION, SOLUTION INTRAVENOUS
Status: COMPLETED
Start: 2017-09-25 | End: 2017-09-25

## 2017-09-25 RX ORDER — FONDAPARINUX SODIUM 5 MG/.4ML
10 INJECTION SUBCUTANEOUS DAILY
Status: DISCONTINUED | OUTPATIENT
Start: 2017-09-25 | End: 2017-09-25 | Stop reason: HOSPADM

## 2017-09-25 RX ORDER — SODIUM CHLORIDE 0.9 % (FLUSH) 0.9 %
10 SYRINGE (ML) INJECTION EVERY 12 HOURS SCHEDULED
Status: CANCELLED | OUTPATIENT
Start: 2017-09-25

## 2017-09-25 RX ORDER — DEXTROSE AND SODIUM CHLORIDE 5; .9 G/100ML; G/100ML
INJECTION, SOLUTION INTRAVENOUS CONTINUOUS
Status: DISCONTINUED | OUTPATIENT
Start: 2017-09-25 | End: 2017-09-25 | Stop reason: HOSPADM

## 2017-09-25 RX ORDER — ONDANSETRON 4 MG/1
4 TABLET, FILM COATED ORAL EVERY 8 HOURS PRN
Status: DISCONTINUED | OUTPATIENT
Start: 2017-09-25 | End: 2017-09-25 | Stop reason: HOSPADM

## 2017-09-25 RX ORDER — POTASSIUM CHLORIDE 20MEQ/15ML
40 LIQUID (ML) ORAL PRN
Status: CANCELLED | OUTPATIENT
Start: 2017-09-25

## 2017-09-25 RX ORDER — POTASSIUM CHLORIDE 7.45 MG/ML
10 INJECTION INTRAVENOUS PRN
Status: CANCELLED | OUTPATIENT
Start: 2017-09-25

## 2017-09-25 RX ORDER — LORAZEPAM 1 MG/1
1 TABLET ORAL 2 TIMES DAILY PRN
Status: DISCONTINUED | OUTPATIENT
Start: 2017-09-25 | End: 2017-09-25

## 2017-09-25 RX ORDER — NICOTINE POLACRILEX 4 MG
15 LOZENGE BUCCAL PRN
Status: CANCELLED | OUTPATIENT
Start: 2017-09-25

## 2017-09-25 RX ORDER — NICOTINE 21 MG/24HR
1 PATCH, TRANSDERMAL 24 HOURS TRANSDERMAL DAILY PRN
Status: CANCELLED | OUTPATIENT
Start: 2017-09-25

## 2017-09-25 RX ORDER — FONDAPARINUX SODIUM 5 MG/.4ML
10 INJECTION SUBCUTANEOUS DAILY
Status: DISCONTINUED | OUTPATIENT
Start: 2017-09-25 | End: 2017-09-25

## 2017-09-25 RX ORDER — DILTIAZEM HYDROCHLORIDE 240 MG/1
240 CAPSULE, COATED, EXTENDED RELEASE ORAL DAILY
Status: DISCONTINUED | OUTPATIENT
Start: 2017-09-25 | End: 2017-09-25

## 2017-09-25 RX ORDER — DEXTROSE MONOHYDRATE 50 MG/ML
100 INJECTION, SOLUTION INTRAVENOUS PRN
Status: CANCELLED | OUTPATIENT
Start: 2017-09-25

## 2017-09-25 RX ORDER — DEXTROSE MONOHYDRATE 25 G/50ML
INJECTION, SOLUTION INTRAVENOUS
Status: DISCONTINUED
Start: 2017-09-25 | End: 2017-09-25

## 2017-09-25 RX ORDER — DULOXETINE 40 MG/1
40 CAPSULE, DELAYED RELEASE ORAL DAILY
Status: DISCONTINUED | OUTPATIENT
Start: 2017-09-25 | End: 2017-09-25 | Stop reason: HOSPADM

## 2017-09-25 RX ORDER — METOCLOPRAMIDE HYDROCHLORIDE 5 MG/ML
10 INJECTION INTRAMUSCULAR; INTRAVENOUS ONCE
Status: COMPLETED | OUTPATIENT
Start: 2017-09-25 | End: 2017-09-25

## 2017-09-25 RX ORDER — PROMETHAZINE HYDROCHLORIDE 25 MG/ML
12.5 INJECTION, SOLUTION INTRAMUSCULAR; INTRAVENOUS EVERY 6 HOURS PRN
Status: CANCELLED | OUTPATIENT
Start: 2017-09-25

## 2017-09-25 RX ORDER — ACETAMINOPHEN 325 MG/1
650 TABLET ORAL EVERY 4 HOURS PRN
Status: CANCELLED | OUTPATIENT
Start: 2017-09-25

## 2017-09-25 RX ADMIN — DEXTROSE MONOHYDRATE 25 G: 25 INJECTION, SOLUTION INTRAVENOUS at 00:02

## 2017-09-25 RX ADMIN — LORAZEPAM 1 MG: 1 TABLET ORAL at 14:31

## 2017-09-25 RX ADMIN — KETOROLAC TROMETHAMINE 30 MG: 30 INJECTION, SOLUTION INTRAMUSCULAR; INTRAVENOUS at 05:55

## 2017-09-25 RX ADMIN — DEXTROSE MONOHYDRATE 100 ML/HR: 100 INJECTION, SOLUTION INTRAVENOUS at 04:34

## 2017-09-25 RX ADMIN — DEXTROSE AND SODIUM CHLORIDE: 5; 900 INJECTION, SOLUTION INTRAVENOUS at 17:53

## 2017-09-25 RX ADMIN — PROCHLORPERAZINE EDISYLATE 10 MG: 5 INJECTION INTRAMUSCULAR; INTRAVENOUS at 02:52

## 2017-09-25 RX ADMIN — FONDAPARINUX SODIUM 10 MG: 5 INJECTION, SOLUTION SUBCUTANEOUS at 14:22

## 2017-09-25 RX ADMIN — ONDANSETRON 4 MG: 2 INJECTION, SOLUTION INTRAMUSCULAR; INTRAVENOUS at 00:39

## 2017-09-25 RX ADMIN — ESOMEPRAZOLE MAGNESIUM 40 MG: 20 CAPSULE, DELAYED RELEASE ORAL at 10:02

## 2017-09-25 RX ADMIN — OCTREOTIDE ACETATE 100 MCG: 100 INJECTION, SOLUTION INTRAVENOUS; SUBCUTANEOUS at 05:29

## 2017-09-25 RX ADMIN — DEXTROSE AND SODIUM CHLORIDE 150 ML/HR: 5; 900 INJECTION, SOLUTION INTRAVENOUS at 01:20

## 2017-09-25 RX ADMIN — METOCLOPRAMIDE 10 MG: 5 INJECTION, SOLUTION INTRAMUSCULAR; INTRAVENOUS at 00:40

## 2017-09-25 RX ADMIN — DEXTROSE MONOHYDRATE: 100 INJECTION, SOLUTION INTRAVENOUS at 15:00

## 2017-09-25 RX ADMIN — DILTIAZEM HYDROCHLORIDE 240 MG: 240 CAPSULE, COATED, EXTENDED RELEASE ORAL at 10:01

## 2017-09-25 RX ADMIN — DEXTROSE MONOHYDRATE 25 G: 25 INJECTION, SOLUTION INTRAVENOUS at 02:35

## 2017-09-25 RX ADMIN — DEXTROSE MONOHYDRATE 25 G: 25 INJECTION, SOLUTION INTRAVENOUS at 04:21

## 2017-09-25 RX ADMIN — DULOXETINE HYDROCHLORIDE 60 MG: 30 CAPSULE, DELAYED RELEASE ORAL at 10:01

## 2017-09-25 ASSESSMENT — PAIN DESCRIPTION - PAIN TYPE: TYPE: ACUTE PAIN;CHRONIC PAIN

## 2017-09-25 ASSESSMENT — ENCOUNTER SYMPTOMS
CONSTIPATION: 0
COUGH: 0
BACK PAIN: 0
ABDOMINAL PAIN: 1
DIARRHEA: 1
SHORTNESS OF BREATH: 0
NAUSEA: 1
VOMITING: 1
BLOOD IN STOOL: 0
RHINORRHEA: 0

## 2017-09-25 ASSESSMENT — PAIN SCALES - GENERAL
PAINLEVEL_OUTOF10: 8

## 2017-09-25 ASSESSMENT — PAIN DESCRIPTION - LOCATION: LOCATION: HEAD;ABDOMEN

## 2017-09-25 NOTE — H&P
per tablet Take 1 tablet by mouth 2 times daily for 7 days 9/22/17 9/29/17  Jeanette Leong,    promethazine (PHENERGAN) 25 MG tablet Take 1 tablet by mouth every 8 hours as needed for Nausea 9/22/17 9/26/17  Jeanette Leong, DO   traMADol Landon Calin) 50 MG tablet Take 1 tablet by mouth every 6 hours as needed for Pain 9/18/17   She Wallace PA-C   ondansetron (ZOFRAN) 4 MG tablet Take 1 tablet by mouth every 8 hours as needed for Nausea 9/11/17   Juan Ramon Michaud,    ondansetron (ZOFRAN ODT) 4 MG disintegrating tablet Take 1 tablet by mouth every 8 hours as needed for Nausea 8/22/17   Santhosh Helms MD   tiZANidine (ZANAFLEX) 4 MG tablet Take 1 tablet by mouth every 6 hours as needed (pain/spasm) 7/2/17   eLela Rodríguez DO   ibuprofen (ADVIL;MOTRIN) 800 MG tablet Take 1 tablet by mouth every 8 hours as needed for Pain 7/2/17   Leela Rodríguez,    ondansetron (ZOFRAN ODT) 4 MG disintegrating tablet Take 1 tablet by mouth every 8 hours as needed for Nausea 6/2/17   Tangela Cardoso MD   zolpidem (AMBIEN) 5 MG tablet Take 5 mg by mouth nightly as needed for Sleep    Historical Provider, MD   LORazepam (ATIVAN) 1 MG tablet Take 1 mg by mouth 2 times daily as needed for Anxiety    Historical Provider, MD   LORazepam (ATIVAN) 2 MG tablet Take 2 mg by mouth nightly as needed for Anxiety    Historical Provider, MD   esomeprazole Magnesium (NEXIUM) 40 MG PACK Take 40 mg by mouth daily    Historical Provider, MD   diltiazem (CARDIZEM CD) 240 MG extended release capsule Take 1 capsule by mouth daily 4/10/17   Flaca Funes MD   DULoxetine (CYMBALTA) 60 MG extended release capsule Take 1 capsule by mouth daily 4/10/17   Flaca Funes MD   insulin glargine (LANTUS) 100 UNIT/ML injection vial Inject 30 Units into the skin nightly 4/10/17 5/10/17  Flaca Funes MD   lisinopril (PRINIVIL;ZESTRIL) 5 MG tablet Take 1 tablet by mouth Daily with lunch 4/11/17   Flaca Funes MD   magnesium oxide (MAG-OX) 400 Hypoglycemia  Active Problems:    Primary hypercoagulable state ([facility]    DM type 2, uncontrolled, with neuropathy ([facility]    Essential hypertension    Antiphospholipid syndrome (HCC)    Iron deficiency anemia due to chronic blood loss      Plan:    1. Started on D10 @ 125 ml/hr. 2. Continue cardizem 240 mg daily. 3. Lisinopril 5 mg daily. 4. On fondaparinux 10 mg sq daily for APL syndrome. 5. nexium daily. 6. Cymbalta 60 mg daily. 7. Monitor BG q1h.   8. General diet. 9. Will check serum sulfonylurea lvl, insulin total lvl, c-peptide lvl, insulin ab, Viij Vazquez MD  PGY-3 Internal Medicine Resident  [address]  9/25/2017        The critical care team assigned to the patient will be following up the patient in the intensive care unit. I have discussed the current plan with the critical care attending. The above mentioned assessment and plan will be reviewed again in detail by the critical care attending at bedside, and can be further changed or modified accordingly by the attending physician. Attending Physician Statement  I have discussed the care of Sophie Aceves, including pertinent history and exam findings with the resident. I have reviewed the key elements of all parts of the encounter with the resident. I have seen and examined the patient with the resident. I agree with the assessment and plan and status of the problem list as documented.     ER records seen CXr and  Abdominal xrays seen, she had presented to ER twice in last few days and sign off, she had Ct scan abdomen which did not show acute abdominal changes, CXR and abdominal xrays were negative for acute changes, she is persistently hypoglycemic and her last dose of Willa Re is on 9/23 and lantus dose on 9/22 night, started on D10 at 200 ml/hr in ER, her abdomen is soft and non tender and non distended, she had c/o vomitting and LFTs and lipase were normal in ER    Addiitionally I recommend:  · Will start oral feeding  · Continue and wean D10 drip   · Once blood sugar is stable and above 100 and D10 is less then 40 to 50 ml/ hr will change IV fluids to D5 NS at 125 ml/hr  · Monitor blood sugar every hour  · Monitor urine output  · Follow up C peptide and insulin level and sulfonylureas level/ screening  · Will resume Fondaparinux     Total critical care time caring for this patient with life threatening, unstable organ failure, including direct patient contact, management of life support systems, review of data including imaging and labs, discussions with other team members and physicians at least 39  Min so far today, excluding procedures.       Myrna Rodriguez MD  9/25/2017 11:44 AM

## 2017-09-25 NOTE — ED PROVIDER NOTES
tobacco: Never Used    Alcohol use No      Comment: Last alcohol use was in 12/2015    Drug use: No      Comment: Pt stole her mom's Benzo 1 yr ago. Pt said she took more than prescribed dose of Valium once in late 90's.  Sexual activity: Not Currently     Other Topics Concern    Not on file     Social History Narrative    ** Merged History Encounter **              Family History   Problem Relation Age of Onset    Depression Mother     High Blood Pressure Mother     High Cholesterol Mother     Diabetes Father     Heart Disease Father     Kidney Disease Father     High Blood Pressure Father     High Cholesterol Father        Portions of the past medical history, surgical history, social history, and family history were discussed and reviewed with the patient/family and is included here or in HPI if pertinent. ALLERGIES / IMMUNIZATIONS / HOME MEDICATIONS       Allergies:  Betadine [povidone iodine]; Celexa [citalopram hydrobromide]; Lasix [furosemide]; Macrobid [nitrofurantoin monohyd macro]; Macrobid [nitrofurantoin]; Other; Betadine [povidone iodine]; Lithium; Paxil [paroxetine hcl]; and Tegretol [carbamazepine]      IMMUNIZATIONS    Immunization History   Administered Date(s) Administered    Influenza Virus Vaccine 10/01/2014, 10/07/2015    Pneumococcal 13-valent Conjugate ([id number]) 10/07/2015    Td 01/07/2015         Home Medications:  Prior to Admission medications    Medication Sig Start Date End Date Taking?  Authorizing Provider   acetaminophen (APAP EXTRA STRENGTH) 500 MG tablet Take 1 tablet by mouth every 4 hours as needed for Pain (Take when out of Norco or when the pain is less severe, do not take at the same time as both contain acetaminophen) 9/22/17   Davis Turner,    amoxicillin-clavulanate (AUGMENTIN) 875-125 MG per tablet Take 1 tablet by mouth 2 times daily for 7 days 9/22/17 9/29/17  Davis Turner DO   promethazine (PHENERGAN) 25 MG tablet Take 1 tablet by mouth every 8 Neck: Normal range of motion. Neck supple. Cardiovascular: Normal rate, regular rhythm, normal heart sounds and intact distal pulses. Pulmonary/Chest: Effort normal and breath sounds normal. No respiratory distress. Abdominal: Soft. Bowel sounds are normal. She exhibits no distension. There is tenderness (right upper quadrant). There is no rebound and no guarding. Musculoskeletal: Normal range of motion. She exhibits no tenderness. Neurological: She is alert and oriented to person, place, and time. Skin: Skin is warm. No rash noted. She is diaphoretic. Psychiatric: She has a normal mood and affect. Nursing note and vitals reviewed. Vitals:    Vitals:    09/25/17 0022   BP: 126/65   Pulse: 78   Resp: 28   Temp: 97.7 °F (36.5 °C)   TempSrc: Oral   SpO2: 96%   Weight: 286 lb 9.6 oz (130 kg)       ORDERS AND MEDICATIONS     PLAN (LABS / IMAGING / EKG):  Orders Placed This Encounter   Procedures    Urine Culture    XR Acute Abd Series Chest 1 VW    Comprehensive Metabolic Panel    CBC WITH AUTO DIFFERENTIAL    URINALYSIS WITH MICROSCOPIC    Troponin    LACTIC ACID, WHOLE BLOOD    LIPASE    Hemoglobin and hematocrit, blood    SODIUM (POC)    POTASSIUM (POC)    CHLORIDE (POC)    CALCIUM, IONIC (POC)    Telemetry monitoring    Inpatient consult to Hospitalist    Pulse oximetry, continuous    POC Blood Gas and Chemistry    POC Glucose Fingerstick    POC Glucose Fingerstick    POCT Glucose    Venous Blood Gas, POC    Creatinine W/GFR Point of Care    Lactic Acid, POC    POCT Glucose    Anion Gap (Calc) POC    POC Glucose Fingerstick    EKG 12 Lead    Insert peripheral IV    PATIENT STATUS (FROM ED OR OR/PROCEDURAL) Inpatient       Plan of care is reviewed and discussed with the family/ patient when able. Family/ Patient consents to treatment and plan if able to do so.     MEDICATIONS ORDERED:  Orders Placed This Encounter   Medications    dextrose 50 % solution Ines Phelan: cabinet override    dextrose 50 % solution 25 g    DISCONTD: dextrose 5 % and 0.45 % sodium chloride infusion    ondansetron (ZOFRAN) injection 4 mg    metoclopramide (REGLAN) injection 10 mg    fentaNYL (SUBLIMAZE) injection 50 mcg    dextrose 5 % and 0.9 % NaCl 5-0.9 % infusion     ANTHONY SCHWAB: cabinet override    dextrose 50 % solution 25 g    dextrose 50 % solution 25 g    prochlorperazine (COMPAZINE) injection 10 mg    fentaNYL (SUBLIMAZE) injection 50 mcg    dextrose 50 % solution     CRISTINA SCHWAB: cabinet override    glucagon (rDNA) injection 1 mg    dextrose 10 % infusion       DIAGNOSTIC RESULTS      LABS:  Results for orders placed or performed during the hospital encounter of 09/24/17   Comprehensive Metabolic Panel   Result Value Ref Range    Glucose 126 (H) 70 - 99 mg/dL    BUN 13 6 - 20 mg/dL    CREATININE 0.46 (L) 0.50 - 0.90 mg/dL    Bun/Cre Ratio NOT REPORTED 9 - 20    Calcium 8.8 8.6 - 10.4 mg/dL    Sodium 138 135 - 144 mmol/L    Potassium 5.0 3.7 - 5.3 mmol/L    Chloride 102 98 - 107 mmol/L    CO2 21 20 - 31 mmol/L    Anion Gap 15 9 - 17 mmol/L    Alkaline Phosphatase 86 35 - 104 U/L    ALT 19 5 - 33 U/L    AST 34 (H) <32 U/L    Total Bilirubin 0.17 (L) 0.3 - 1.2 mg/dL    Total Protein 6.8 6.4 - 8.3 g/dL    Alb 3.3 (L) 3.5 - 5.2 g/dL    Albumin/Globulin Ratio 0.9 (L) 1.0 - 2.5    GFR Non-African American >60 >60 mL/min    GFR African American >60 >60 mL/min    GFR Comment          GFR Staging NOT REPORTED    CBC WITH AUTO DIFFERENTIAL   Result Value Ref Range    WBC 9.9 3.5 - 11.0 k/uL    RBC 4.24 4.0 - 5.2 m/uL    Hemoglobin 12.9 12.0 - 16.0 g/dL    Hematocrit 38.8 36 - 46 %    MCV 91.6 80 - 100 fL    MCH 30.3 26 - 34 pg    MCHC 33.1 31 - 37 g/dL    RDW 13.8 12.5 - 15.4 %    Platelets 360 045 - 735 k/uL    MPV 11.1 6.0 - 12.0 fL    Differential Type NOT REPORTED     Seg Neutrophils 56 %    Lymphocytes 33 %    Monocytes 8 %    Eosinophils % 2 %    Basophils 1 % NOT REPORTED mm Hg   Creatinine W/GFR Point of Care   Result Value Ref Range    POC Creatinine 0.65 0.51 - 1.19 mg/dL    GFR Comment >60 >60 mL/min    GFR Non-African American >60 >60 mL/min    GFR Comment         Lactic Acid, POC   Result Value Ref Range    POC Lactic Acid 2.15 (H) 0.56 - 1.39 mmol/L   POCT Glucose   Result Value Ref Range    POC Glucose 57 (L) 74 - 100 mg/dL   Anion Gap (Calc) POC   Result Value Ref Range    Anion Gap 12 7 - 16 mmol/L   POC Glucose Fingerstick   Result Value Ref Range    POC Glucose 50 (L) 65 - 105 mg/dL     Labs Reviewed   COMPREHENSIVE METABOLIC PANEL - Abnormal; Notable for the following:        Result Value    Glucose 126 (*)     CREATININE 0.46 (*)     AST 34 (*)     Total Bilirubin 0.17 (*)     Alb 3.3 (*)     Albumin/Globulin Ratio 0.9 (*)     All other components within normal limits   LIPASE - Abnormal; Notable for the following:     Lipase 11 (*)     All other components within normal limits   SODIUM (POC) - Abnormal; Notable for the following:     POC Sodium 147 (*)     All other components within normal limits   CHLORIDE (POC) - Abnormal; Notable for the following:     POC Chloride 109 (*)     All other components within normal limits   POC GLUCOSE FINGERSTICK - Abnormal; Notable for the following:     POC Glucose 22 (*)     All other components within normal limits   POC GLUCOSE FINGERSTICK - Abnormal; Notable for the following:     POC Glucose 160 (*)     All other components within normal limits   VENOUS BLOOD GAS, POINT OF CARE - Abnormal; Notable for the following:     pO2, Bryant 75.1 (*)     O2 Sat, Bryant 94 (*)     All other components within normal limits   LACTIC ACID,POINT OF CARE - Abnormal; Notable for the following:     POC Lactic Acid 2.15 (*)     All other components within normal limits   POCT GLUCOSE - Abnormal; Notable for the following:     POC Glucose 57 (*)     All other components within normal limits   POC GLUCOSE FINGERSTICK - Abnormal; Notable for process is seen involving the right shoulder or humerus. Xr Humerus Right (min 2 Views)    Result Date: 9/21/2017  EXAMINATION: 3 VIEWS OF THE RIGHT SHOULDER; AP AND LATERAL VIEWS OF THE RIGHT HUMERUS 9/21/2017 10:44 am COMPARISON: None. HISTORY: ORDERING SYSTEM PROVIDED HISTORY: fall againt shoulder with decreased ROM TECHNOLOGIST PROVIDED HISTORY: Reason for exam:->fall againt shoulder with decreased ROM Reason for exam:->Please inculde axillary and y-views FINDINGS: Right shoulder:  No acute bony process is seen. AC and glenohumeral joints appear unremarkable. Subacromial space appears well maintained. Visualized right lung field is clear. Right humerus:  No focal soft tissue abnormality. No acute bony process is seen. No acute bony process is seen involving the right shoulder or humerus. Xr Acute Abd Series Chest 1 Vw    Result Date: 9/25/2017  EXAMINATION: ACUTE ABDOMINAL SERIES 9/25/2017 12:46 am COMPARISON: 08/04/2016 HISTORY: ORDERING SYSTEM PROVIDED HISTORY: abdominal pain, hx mult abd sx TECHNOLOGIST PROVIDED HISTORY: Reason for exam:->abdominal pain, hx mult abd sx FINDINGS: Chest:  The mediastinal and cardiac contours are normal.  The lungs are clear. There is no pleural effusion or pneumothorax present. Abdomen: Upright and supine views of the abdomen were performed. There is no free intraperitoneal air present. No abnormally dilated loops of large or small bowel are identified. An IVC filter is noted. 1. No acute cardiopulmonary process identified. 2. Nonobstructive bowel gas pattern.        EKG  EKG Interpretation    Interpreted by me    Rhythm: normal sinus   Rate: normal  Axis: normal  Ectopy: none  Conduction: normal  ST Segments: no acute change  T Waves: no acute change  Q Waves: none    Clinical Impression: no acute changes and normal EKG    All EKG's are interpreted by the Emergency Department Physician who either signs or Co-signs this chart in the absence of a cardiologist.    ED BEDSIDE ULTRASOUND:   Not indicated      DIFFERENTIAL DX / [facility]     DIFFERENTIAL DIAGNOSIS:  Hypoglycemia, infection, bowel obstruction GERD, PUD, pancreatitis, cholecystitis, GB colic, cholangitis, Tobk-Cgsu-Eeikku, SBO, DKA, AAA, mesenteric ischemia, perforated viscous, acute gastroenteritis, NSAP, pyelonephritis, kidney stone, appendicitis, hernia, UTI, constipation, ectopic, ovarian torsion, ovarian cyst, PID, tuboovarian abscess, period/ fibroid      EMERGENCY DEPARTMENT COURSE/MDM  Patient presents with hypoglycemia. On arrival patient is diaphoretic and appears in some distress. Blood glucose is 22. IV access obtained as well as multiple labs and patient was given D50 and started on D5 half-normal saline. On exam patient diaphoretic. Alert and oriented. Abdomen soft with right upper quadrant tenderness as well as right flank tenderness. No rebound or guarding. Heart regular rate and rhythm, lungs clear auscultation bilaterally. Patient appears uncomfortable. We'll obtain septic workup. We'll obtain abdominal series to rule out obstruction or pneumonia. Lab work otherwise unremarkable other than persistent hypoglycemia. Patient was given an amp of D50 and had increase in blood sugar in the 160s. On reassessment patient down to the 50s after about an hour after initial draw. Concern for possible malingering or exogenous use of insulin that she is not admitting to. Patient switched to D10 and given another amp of D50 as well as glucagon. Patient admitted to [facility] for further management of her hypoglycemia and possible insulin or Januvia overdose. Patient initially resistant to admission but did finally agree after benefits and risks were explained. Patient continually having repeated episodes of hypoglycemia.   The police spoke with the patient about whether she was using any insulin or any other diabetic medications and patient adamantly denying. Patient given multiple doses of D50 as well as a dose of glucagon. Continued to have a hypoglycemic episodes patient fluids changed from D5 to D10 initially at 100 mils per hour which was then increased to 100 mils per hour. Patient was also given octreotide to possibly help stem the hypoglycemia from occurring again. Spoke with [facility] about the change in her management in how patient was persistently hypoglycemic. Concern for possible insulinoma versus malingering or fictitious disease. [facility] recommending switch in status to ICU. Patient awaiting bed. Care signed out. PROCEDURES:  Procedures    CONSULTS:  IP CONSULT TO HOSPITALIST  IP CONSULT TO GI    CRITICAL CARE:  See attending note    FINAL IMPRESSION      1.  Hypoglycemia              DISPOSITION / PLAN     DISPOSITION     Admitted    PATIENT REFERRED TO:  Mari Johnson MD  [address]  [phone]            DISCHARGE MEDICATIONS:  New Prescriptions    No medications on file       Jose Carlos Cueva DO  Emergency Medicine Resident    (Please note that portions of this note were completed with a voice recognition program.  Efforts were made to edit the dictations but occasionally words are mis-transcribed.)        Jose Carlos Cueva DO  09/25/17 [id number]

## 2017-09-25 NOTE — ED NOTES
Enter room. Patient profusely diaphoretic. Drowsy but following commands. Resident in room FSBS ordered.       Aminah Yusuf RN  09/25/17 5644

## 2017-09-25 NOTE — ED PROVIDER NOTES
[facility]     Emergency Department     Faculty Attestation    I performed a history and physical examination of the patient and discussed management with the resident. I reviewed the residents note and agree with the documented findings and plan of care. Any areas of disagreement are noted on the chart. I was personally present for the key portions of any procedures. I have documented in the chart those procedures where I was not present during the key portions. I have reviewed the emergency nurses triage note. I agree with the chief complaint, past medical history, past surgical history, allergies, medications, social and family history as documented unless otherwise noted below. Documentation of the HPI, Physical Exam and Medical Decision Making performed by medical students or scribes is based on my personal performance of the HPI, PE and MDM. For Physician Assistant/ Nurse Practitioner cases/documentation I have personally evaluated this patient and have completed at least one if not all key elements of the E/M (history, physical exam, and MDM). Additional findings are as noted.         Dianne Barrera MD  Attending Emergency  Physician             Rocío Laureano MD  09/25/17 4420

## 2017-09-25 NOTE — DISCHARGE SUMMARY
[address] [place] [address]     Department of Internal Medicine - Critical Care Service    INPATIENT DISCHARGE SUMMARY      PATIENT IDENTIFICATION:  NAME:  Bharathi Landon   :   1980  MRN:    [id number]     Acct:    [de-identified]   Admit Date:  2017  Discharge date:  No discharge date for patient encounter. Attending Provider: Hank Hall MD                                     Principal Problem:    Hypoglycemia  Active Problems:    Primary hypercoagulable state ([facility]    DM type 2, uncontrolled, with neuropathy ([facility]    Depression    Essential hypertension    Antiphospholipid syndrome ([facility]    Iron deficiency anemia due to chronic blood loss       REASON FOR HOSPITALIZATION:   Chief Complaint   Patient presents with    Hypoglycemia   [employer] Headache          Hospital Course  Patient was admitted with hypoglycemia. Hx of DM on insulin and januvia, She was started on D10 gtt. Blood glucose was checked hourly with improvement. Patient left AMA for family emergency. Patient advised to f/u with her PCP.      Consults:   none    Procedures:  none    Any Hospital Acquired Infections: none     PATIENT'S DISCHARGE CONDITION:  Stable     PATIENT/FAMILY INSTRUCTIONS:   Current Discharge Medication List      CONTINUE these medications which have NOT CHANGED    Details   acetaminophen (APAP EXTRA STRENGTH) 500 MG tablet Take 1 tablet by mouth every 4 hours as needed for Pain (Take when out of Norco or when the pain is less severe, do not take at the same time as both contain acetaminophen)  Qty: 30 tablet, Refills: 0      amoxicillin-clavulanate (AUGMENTIN) 875-125 MG per tablet Take 1 tablet by mouth 2 times daily for 7 days  Qty: 14 tablet, Refills: 0      promethazine (PHENERGAN) 25 MG tablet Take 1 tablet by mouth every 8 hours as needed for Nausea  Qty: 12 tablet, Refills: 0      traMADol (ULTRAM) 50 MG tablet Take 1 tablet by mouth every 6 hours as needed for Pain  Qty: 10 tablet, Refills: 0 ondansetron (ZOFRAN) 4 MG tablet Take 1 tablet by mouth every 8 hours as needed for Nausea  Qty: 4 tablet, Refills: 0      !! ondansetron (ZOFRAN ODT) 4 MG disintegrating tablet Take 1 tablet by mouth every 8 hours as needed for Nausea  Qty: 20 tablet, Refills: 0      tiZANidine (ZANAFLEX) 4 MG tablet Take 1 tablet by mouth every 6 hours as needed (pain/spasm)  Qty: 40 tablet, Refills: 0      ibuprofen (ADVIL;MOTRIN) 800 MG tablet Take 1 tablet by mouth every 8 hours as needed for Pain  Qty: 30 tablet, Refills: 0      !! ondansetron (ZOFRAN ODT) 4 MG disintegrating tablet Take 1 tablet by mouth every 8 hours as needed for Nausea  Qty: 8 tablet, Refills: 0      zolpidem (AMBIEN) 5 MG tablet Take 5 mg by mouth nightly as needed for Sleep      !! LORazepam (ATIVAN) 1 MG tablet Take 1 mg by mouth 2 times daily as needed for Anxiety      !! LORazepam (ATIVAN) 2 MG tablet Take 2 mg by mouth nightly as needed for Anxiety      esomeprazole Magnesium (NEXIUM) 40 MG PACK Take 40 mg by mouth daily      diltiazem (CARDIZEM CD) 240 MG extended release capsule Take 1 capsule by mouth daily  Qty: 30 capsule, Refills: 3      DULoxetine (CYMBALTA) 60 MG extended release capsule Take 1 capsule by mouth daily  Qty: 30 capsule, Refills: 3      insulin glargine (LANTUS) 100 UNIT/ML injection vial Inject 30 Units into the skin nightly  Qty: 4.5 mL, Refills: 3    Associated Diagnoses: Type 2 diabetes mellitus with diabetic polyneuropathy (HCC)      lisinopril (PRINIVIL;ZESTRIL) 5 MG tablet Take 1 tablet by mouth Daily with lunch  Qty: 30 tablet, Refills: 3      magnesium oxide (MAG-OX) 400 (241.3 MG) MG TABS tablet Take 1 tablet by mouth 2 times daily  Qty: 30 tablet, Refills: 1      silver sulfADIAZINE (SILVADENE) 1 % cream Apply topically daily. Qty: 25 g, Refills: 0      Menthol, Topical Analgesic, 5 % PADS Apply to the chest wall, as needed for pain, daily.   Qty: 10 each, Refills: 1      vitamin D (CHOLECALCIFEROL) 1000 UNIT TABS

## 2017-09-25 NOTE — ED PROVIDER NOTES
[facility]  Faculty Handoff       Handoff taken on the following patient    Pt Name: Vicky Langston  PCP:  Kirk Colindres MD      [address]       Chief Complaint   Patient presents with    Hypoglycemia    Headache         CURRENT MEDICATIONS     Previous Medications  Previous Medications    ACETAMINOPHEN (APAP EXTRA STRENGTH) 500 MG TABLET    Take 1 tablet by mouth every 4 hours as needed for Pain (Take when out of Norco or when the pain is less severe, do not take at the same time as both contain acetaminophen)    AMOXICILLIN-CLAVULANATE (AUGMENTIN) 875-125 MG PER TABLET    Take 1 tablet by mouth 2 times daily for 7 days    BUMETANIDE (BUMEX) 2 MG TABLET    Take 1 tablet by mouth daily    DILTIAZEM (CARDIZEM CD) 240 MG EXTENDED RELEASE CAPSULE    Take 1 capsule by mouth daily    DULOXETINE (CYMBALTA) 60 MG EXTENDED RELEASE CAPSULE    Take 1 capsule by mouth daily    DULOXETINE HCL 40 MG CPEP    Take 40 mg by mouth daily Do not crush or break. May add contents of capsule to apple juice or apple sauce, but not chocolate. ESOMEPRAZOLE MAGNESIUM (NEXIUM) 40 MG PACK    Take 40 mg by mouth daily    FONDAPARINUX (ARIXTRA) 10 MG/0.8ML SOLN INJECTION    INJECT 0.8MLS INTO THE SKIN DAILY    GLUCOSE MONITORING KIT (FREESTYLE) MONITORING KIT    1 kit by Does not apply route daily as needed    IBUPROFEN (ADVIL;MOTRIN) 800 MG TABLET    Take 1 tablet by mouth every 8 hours as needed for Pain    INSULIN ASPART (NOVOLOG FLEXPEN) 100 UNIT/ML INJECTION PEN    Inject 5 Units into the skin 3 times daily (before meals) Hold if sugar below 100    INSULIN GLARGINE (LANTUS) 100 UNIT/ML INJECTION VIAL    Inject 30 Units into the skin nightly    INSULIN PEN NEEDLE 31G X 6 MM MISC    1 each by Does not apply route 2 times daily    LANCETS MISC    1 each by Other route 3 times daily    LIDOCAINE (XYLOCAINE) 5 % OINTMENT    Apply topically as needed.     LISINOPRIL (PRINIVIL;ZESTRIL) 5 components within normal limits   POCT GLUCOSE - Abnormal; Notable for the following:     POC Glucose 57 (*)     All other components within normal limits   POC GLUCOSE FINGERSTICK - Abnormal; Notable for the following:     POC Glucose 50 (*)     All other components within normal limits   POC GLUCOSE FINGERSTICK - Abnormal; Notable for the following:     POC Glucose 41 (*)     All other components within normal limits   POC GLUCOSE FINGERSTICK - Abnormal; Notable for the following:     POC Glucose 59 (*)     All other components within normal limits   POCT GLUCOSE - Normal   POCT GLUCOSE - Normal   POCT GLUCOSE - Normal   URINE CULTURE   CBC WITH AUTO DIFFERENTIAL   TROPONIN   LACTIC ACID, WHOLE BLOOD   HGB/HCT   POTASSIUM (POC)   CALCIUM, IONIC (POC)   URINALYSIS WITH MICROSCOPIC   CBC WITH AUTO DIFFERENTIAL   COMPREHENSIVE METABOLIC PANEL   CREATININE W/GFR POINT OF CARE   ANION GAP (CALC) POC   POC GLUCOSE FINGERSTICK   POC GLUCOSE FINGERSTICK   POC BLOOD GAS AND CHEMISTRY           PLAN/ TASKS OUTSTANDING     Handoff patient  Verbal report taken  No outstanding tasks    [place] disposition        (Please note that portions of this note were completed with a voice recognition program.  Efforts were made to edit the dictations but occasionally words are mis-transcribed.)    Borges MD, F.A.C.E.P.   Attending Emergency Physician                   Cheryl Lyle MD  09/25/17 [id number]

## 2017-09-25 NOTE — CARE COORDINATION
states that pt is usually independent at home. Discharge plan to be determined based on hospital course.          Electronically signed by Ruddy Pagan RN on 9/25/17 at 7:31 AM

## 2017-09-25 NOTE — ED PROVIDER NOTES
[facility]  ED  Emergency Department  Emergency Medicine Resident Sign-out     Care of Jonn Maurice was assumed from Dr. Virgil Houser and is being seen for Hypoglycemia and Headache  . The patient's initial evaluation and plan have been discussed with the prior provider who initially evaluated the patient.      EMERGENCY DEPARTMENT COURSE / MEDICAL DECISION MAKING:       MEDICATIONS GIVEN:  Orders Placed This Encounter   Medications    dextrose 50 % solution     CRISTINA SCHWAB: cabinet override    dextrose 50 % solution 25 g    DISCONTD: dextrose 5 % and 0.45 % sodium chloride infusion    ondansetron (ZOFRAN) injection 4 mg    metoclopramide (REGLAN) injection 10 mg    fentaNYL (SUBLIMAZE) injection 50 mcg    dextrose 5 % and 0.9 % NaCl 5-0.9 % infusion     CRISTINA SCHWAB: cabinet override    dextrose 50 % solution 25 g    dextrose 50 % solution 25 g    prochlorperazine (COMPAZINE) injection 10 mg    fentaNYL (SUBLIMAZE) injection 50 mcg    dextrose 50 % solution     CRISTINA SCHWAB: cabinet override    glucagon (rDNA) injection 1 mg    DISCONTD: dextrose 10 % infusion    magnesium sulfate 1 g in dextrose 5% 100 mL IVPB    dextrose 50 % solution 25 g    octreotide (SANDOSTATIN) injection 100 mcg    ketorolac (TORADOL) injection 30 mg    DISCONTD: dextrose 10 % infusion    dextrose 10 % infusion       LABS / RADIOLOGY:     Labs Reviewed   COMPREHENSIVE METABOLIC PANEL - Abnormal; Notable for the following:        Result Value    Glucose 126 (*)     CREATININE 0.46 (*)     AST 34 (*)     Total Bilirubin 0.17 (*)     Alb 3.3 (*)     Albumin/Globulin Ratio 0.9 (*)     All other components within normal limits   LIPASE - Abnormal; Notable for the following:     Lipase 11 (*)     All other components within normal limits   SODIUM (POC) - Abnormal; Notable for the following:     POC Sodium 147 (*)     All other components within normal limits   CHLORIDE (POC) - Abnormal; Notable for the following:     POC Chloride 109 (*)     All other components within normal limits   POC GLUCOSE FINGERSTICK - Abnormal; Notable for the following:     POC Glucose 22 (*)     All other components within normal limits   POC GLUCOSE FINGERSTICK - Abnormal; Notable for the following:     POC Glucose 160 (*)     All other components within normal limits   VENOUS BLOOD GAS, POINT OF CARE - Abnormal; Notable for the following:     pO2, Bryant 75.1 (*)     O2 Sat, Bryant 94 (*)     All other components within normal limits   LACTIC ACID,POINT OF CARE - Abnormal; Notable for the following:     POC Lactic Acid 2.15 (*)     All other components within normal limits   POCT GLUCOSE - Abnormal; Notable for the following:     POC Glucose 57 (*)     All other components within normal limits   POC GLUCOSE FINGERSTICK - Abnormal; Notable for the following:     POC Glucose 50 (*)     All other components within normal limits   POC GLUCOSE FINGERSTICK - Abnormal; Notable for the following:     POC Glucose 41 (*)     All other components within normal limits   POC GLUCOSE FINGERSTICK - Abnormal; Notable for the following:     POC Glucose 59 (*)     All other components within normal limits   POCT GLUCOSE - Normal   POCT GLUCOSE - Normal   POCT GLUCOSE - Normal   URINE CULTURE   CBC WITH AUTO DIFFERENTIAL   TROPONIN   LACTIC ACID, WHOLE BLOOD   HGB/HCT   POTASSIUM (POC)   CALCIUM, IONIC (POC)   CBC WITH AUTO DIFFERENTIAL   URINALYSIS WITH MICROSCOPIC   COMPREHENSIVE METABOLIC PANEL   CREATININE W/GFR POINT OF CARE   ANION GAP (CALC) POC   POC GLUCOSE FINGERSTICK   POC GLUCOSE FINGERSTICK   POC BLOOD GAS AND CHEMISTRY       Ct Abdomen Pelvis Wo Iv Contrast Additional Contrast? None    Result Date: 9/22/2017  EXAMINATION: CT OF THE ABDOMEN AND PELVIS WITHOUT CONTRAST 9/22/2017 7:13 am TECHNIQUE: CT of the abdomen and pelvis was performed without the administration of intravenous contrast. Multiplanar reformatted images are provided for review. Dose modulation, iterative reconstruction, and/or weight based adjustment of the mA/kV was utilized to reduce the radiation dose to as low as reasonably achievable. COMPARISON: 07/02/2017 HISTORY: ORDERING SYSTEM PROVIDED HISTORY: abdominal pain TECHNOLOGIST PROVIDED HISTORY: Additional Contrast?->None FINDINGS: Lower Chest: There are patchy infiltrates or atelectasis in the left posterior lung base, new finding. No pleural effusion. Organs: The liver has normal size and contour. The previously reported stable hypodense subcapsular mass with internal calcifications in the anterior margin of the left hepatic lobe is again identified. This finding is unchanged since February 2016 study. The previously reported 1.5 cm mass in the right hepatic dome is again identified, stable. Cholecystectomy clips are noted in the gallbladder fossa. No intrahepatic biliary ectasia. Common bile duct, pancreas, spleen and the adrenal glands are within normal limits. No intrarenal calculi or hydronephrosis. GI/Bowel: Evidence of a prior gastric bypass surgery. No intestinal dilatation, obstruction or bowel wall thickening. Appendix is not visualized. However, fat planes around the cecum are well preserved. Pelvis: There is evidence of a hysterectomy. Urinary bladder appears normal. Multiple surgical clips in the pelvis. No pelvic mass, lymphadenopathy or free fluid. Peritoneum/Retroperitoneum: Abdominal aorta is normal in caliber without aneurysm. IVC filter is in place. No retroperitoneal lymphadenopathy. Bones/Soft Tissues: Lumbar degenerative changes. Significant degenerative disc space narrowing at the levels of L3-L4 and L4-L5 with formation of marginal osteophytes. 1.  Stable liver lesions. No significant interval change. 2.  Status post gastric bypass surgery, cholecystectomy and hysterectomy. 3.  No intra-abdominal or pelvic acute CT abnormality.  4.  Patchy infiltrates and/or atelectasis in the left posterior lung base, new finding. Xr Chest Standard Two Vw    Result Date: 9/11/2017  EXAMINATION: TWO VIEWS OF THE CHEST 9/11/2017 9:35 pm COMPARISON: August 22, 2017 HISTORY: ORDERING SYSTEM PROVIDED HISTORY: chest pain TECHNOLOGIST PROVIDED HISTORY: Reason for exam:->chest pain FINDINGS: The lungs are without acute focal process. There is no effusion or pneumothorax. The cardiomediastinal silhouette is without acute process. The osseous structures are without acute process. No acute process. Xr Shoulder Right (min 2 Views)    Result Date: 9/21/2017  EXAMINATION: 3 VIEWS OF THE RIGHT SHOULDER; AP AND LATERAL VIEWS OF THE RIGHT HUMERUS 9/21/2017 10:44 am COMPARISON: None. HISTORY: ORDERING SYSTEM PROVIDED HISTORY: fall againt shoulder with decreased ROM TECHNOLOGIST PROVIDED HISTORY: Reason for exam:->fall againt shoulder with decreased ROM Reason for exam:->Please inculde axillary and y-views FINDINGS: Right shoulder:  No acute bony process is seen. AC and glenohumeral joints appear unremarkable. Subacromial space appears well maintained. Visualized right lung field is clear. Right humerus:  No focal soft tissue abnormality. No acute bony process is seen. No acute bony process is seen involving the right shoulder or humerus. Xr Humerus Right (min 2 Views)    Result Date: 9/21/2017  EXAMINATION: 3 VIEWS OF THE RIGHT SHOULDER; AP AND LATERAL VIEWS OF THE RIGHT HUMERUS 9/21/2017 10:44 am COMPARISON: None. HISTORY: ORDERING SYSTEM PROVIDED HISTORY: fall againt shoulder with decreased ROM TECHNOLOGIST PROVIDED HISTORY: Reason for exam:->fall againt shoulder with decreased ROM Reason for exam:->Please inculde axillary and y-views FINDINGS: Right shoulder:  No acute bony process is seen. AC and glenohumeral joints appear unremarkable. Subacromial space appears well maintained. Visualized right lung field is clear. Right humerus:  No focal soft tissue abnormality. No acute bony process is seen.      No acute bony process is seen involving the right shoulder or humerus. Xr Acute Abd Series Chest 1 Vw    Result Date: 9/25/2017  EXAMINATION: ACUTE ABDOMINAL SERIES 9/25/2017 12:46 am COMPARISON: 08/04/2016 HISTORY: ORDERING SYSTEM PROVIDED HISTORY: abdominal pain, hx mult abd sx TECHNOLOGIST PROVIDED HISTORY: Reason for exam:->abdominal pain, hx mult abd sx FINDINGS: Chest:  The mediastinal and cardiac contours are normal.  The lungs are clear. There is no pleural effusion or pneumothorax present. Abdomen: Upright and supine views of the abdomen were performed. There is no free intraperitoneal air present. No abnormally dilated loops of large or small bowel are identified. An IVC filter is noted. 1. No acute cardiopulmonary process identified. 2. Nonobstructive bowel gas pattern. RECENT VITALS:     Temp: 97.7 °F (36.5 °C),  Pulse: 78, Resp: 28, BP: 121/81, SpO2: 97 %    This patient is a 44 y.o. Female with an episode  Of glycemia. When patient initially presented she was diaphoretic. Initial glucose was in the 22 patient was initially stabilized with evidence of D50. Initially started on a D10 drip and octreotide. Abdomen sugar stabilized and 60 patient has been admitted to intermediate initially but now going to the medical ICU. We'll continue to monitor, currently protecting glucose every hour. Patient will need close monitoring      ED Course       OUTSTANDING TASKS / RECOMMENDATIONS:    1. No outstanding labs or imaging  2. Follow-up on 1 hour glucose checks. FINAL IMPRESSION:     1.  Hypoglycemia        DISPOSITION:         DISPOSITION:  []  Discharge   []  Transfer -    []  Admission -     []  Against Medical Advice   []  Eloped   FOLLOW-UP: Tanya Gardner MD  [address] [place] [address]  [phone]           DISCHARGE MEDICATIONS: New Prescriptions    No medications on file          John Myles MD  Emergency Medicine Resident  [address] [place] [address]

## 2017-09-25 NOTE — ED NOTES
Pt resting, updated on plans of care. Plan to transfer to unit soon.      Saji Elaine RN  09/25/17 1637

## 2017-10-22 ENCOUNTER — HOSPITAL ENCOUNTER (EMERGENCY)
Age: 37
Discharge: HOME | End: 2017-10-22
Attending: EMERGENCY MEDICINE
Payer: MEDICARE

## 2017-10-22 ENCOUNTER — APPOINTMENT (OUTPATIENT)
Dept: GENERAL RADIOLOGY | Age: 37
End: 2017-10-22
Payer: MEDICARE

## 2017-10-22 VITALS
SYSTOLIC BLOOD PRESSURE: 141 MMHG | DIASTOLIC BLOOD PRESSURE: 78 MMHG | TEMPERATURE: 97.3 F | RESPIRATION RATE: 20 BRPM | OXYGEN SATURATION: 100 % | HEART RATE: 95 BPM

## 2017-10-22 DIAGNOSIS — E11.65 TYPE 2 DIABETES MELLITUS WITH HYPERGLYCEMIA, WITH LONG-TERM CURRENT USE OF INSULIN (HCC): ICD-10-CM

## 2017-10-22 DIAGNOSIS — R11.2 INTRACTABLE VOMITING WITH NAUSEA, UNSPECIFIED VOMITING TYPE: Primary | ICD-10-CM

## 2017-10-22 DIAGNOSIS — Z79.4 TYPE 2 DIABETES MELLITUS WITH HYPERGLYCEMIA, WITH LONG-TERM CURRENT USE OF INSULIN (HCC): ICD-10-CM

## 2017-10-22 DIAGNOSIS — R10.13 ABDOMINAL PAIN, EPIGASTRIC: ICD-10-CM

## 2017-10-22 LAB
ABSOLUTE EOS #: 0.1 K/UL (ref 0–0.4)
ABSOLUTE IMMATURE GRANULOCYTE: NORMAL K/UL (ref 0–0.3)
ABSOLUTE LYMPH #: 1 K/UL (ref 1–4.8)
ABSOLUTE MONO #: 0.4 K/UL (ref 0.1–1.2)
ALBUMIN SERPL-MCNC: 3.5 G/DL (ref 3.5–5.2)
ALBUMIN/GLOBULIN RATIO: 1 (ref 1–2.5)
ALP BLD-CCNC: 108 U/L (ref 35–104)
ALT SERPL-CCNC: 16 U/L (ref 5–33)
ANION GAP SERPL CALCULATED.3IONS-SCNC: 15 MMOL/L (ref 9–17)
AST SERPL-CCNC: 17 U/L
BASOPHILS # BLD: 1 %
BASOPHILS ABSOLUTE: 0.1 K/UL (ref 0–0.2)
BILIRUB SERPL-MCNC: 0.2 MG/DL (ref 0.3–1.2)
BUN BLDV-MCNC: 16 MG/DL (ref 6–20)
BUN/CREAT BLD: ABNORMAL (ref 9–20)
CALCIUM SERPL-MCNC: 9.4 MG/DL (ref 8.6–10.4)
CHLORIDE BLD-SCNC: 101 MMOL/L (ref 98–107)
CO2: 23 MMOL/L (ref 20–31)
CREAT SERPL-MCNC: 0.59 MG/DL (ref 0.5–0.9)
DIFFERENTIAL TYPE: NORMAL
EOSINOPHILS RELATIVE PERCENT: 1 %
GFR AFRICAN AMERICAN: >60 ML/MIN
GFR NON-AFRICAN AMERICAN: >60 ML/MIN
GFR SERPL CREATININE-BSD FRML MDRD: ABNORMAL ML/MIN/{1.73_M2}
GFR SERPL CREATININE-BSD FRML MDRD: ABNORMAL ML/MIN/{1.73_M2}
GLUCOSE BLD-MCNC: 285 MG/DL (ref 70–99)
HCT VFR BLD CALC: 42.6 % (ref 36–46)
HEMOGLOBIN: 14.2 G/DL (ref 12–16)
IMMATURE GRANULOCYTES: NORMAL %
LIPASE: 15 U/L (ref 13–60)
LYMPHOCYTES # BLD: 17 %
MCH RBC QN AUTO: 30.1 PG (ref 26–34)
MCHC RBC AUTO-ENTMCNC: 33.3 G/DL (ref 31–37)
MCV RBC AUTO: 90.2 FL (ref 80–100)
MONOCYTES # BLD: 6 %
PDW BLD-RTO: 13.6 % (ref 12.5–15.4)
PLATELET # BLD: 248 K/UL (ref 140–450)
PLATELET ESTIMATE: NORMAL
PMV BLD AUTO: 11 FL (ref 6–12)
POTASSIUM SERPL-SCNC: 4.6 MMOL/L (ref 3.7–5.3)
RBC # BLD: 4.72 M/UL (ref 4–5.2)
RBC # BLD: NORMAL 10*6/UL
SEG NEUTROPHILS: 75 %
SEGMENTED NEUTROPHILS ABSOLUTE COUNT: 4.6 K/UL (ref 1.8–7.7)
SODIUM BLD-SCNC: 139 MMOL/L (ref 135–144)
TOTAL PROTEIN: 7.1 G/DL (ref 6.4–8.3)
WBC # BLD: 6.1 K/UL (ref 3.5–11)
WBC # BLD: NORMAL 10*3/UL

## 2017-10-22 PROCEDURE — 99284 EMERGENCY DEPT VISIT MOD MDM: CPT

## 2017-10-22 PROCEDURE — 83690 ASSAY OF LIPASE: CPT

## 2017-10-22 PROCEDURE — 96374 THER/PROPH/DIAG INJ IV PUSH: CPT

## 2017-10-22 PROCEDURE — 96375 TX/PRO/DX INJ NEW DRUG ADDON: CPT

## 2017-10-22 PROCEDURE — 6360000002 HC RX W HCPCS: Performed by: PHYSICIAN ASSISTANT

## 2017-10-22 PROCEDURE — 85025 COMPLETE CBC W/AUTO DIFF WBC: CPT

## 2017-10-22 PROCEDURE — 81001 URINALYSIS AUTO W/SCOPE: CPT

## 2017-10-22 PROCEDURE — 74022 RADEX COMPL AQT ABD SERIES: CPT

## 2017-10-22 PROCEDURE — 96372 THER/PROPH/DIAG INJ SC/IM: CPT

## 2017-10-22 PROCEDURE — 80053 COMPREHEN METABOLIC PANEL: CPT

## 2017-10-22 PROCEDURE — 2580000003 HC RX 258: Performed by: PHYSICIAN ASSISTANT

## 2017-10-22 RX ORDER — 0.9 % SODIUM CHLORIDE 0.9 %
1000 INTRAVENOUS SOLUTION INTRAVENOUS ONCE
Status: COMPLETED | OUTPATIENT
Start: 2017-10-22 | End: 2017-10-22

## 2017-10-22 RX ORDER — DICYCLOMINE HYDROCHLORIDE 10 MG/ML
20 INJECTION INTRAMUSCULAR ONCE
Status: COMPLETED | OUTPATIENT
Start: 2017-10-22 | End: 2017-10-22

## 2017-10-22 RX ORDER — PROMETHAZINE HYDROCHLORIDE 25 MG/ML
12.5 INJECTION, SOLUTION INTRAMUSCULAR; INTRAVENOUS ONCE
Status: COMPLETED | OUTPATIENT
Start: 2017-10-22 | End: 2017-10-22

## 2017-10-22 RX ORDER — ONDANSETRON 2 MG/ML
4 INJECTION INTRAMUSCULAR; INTRAVENOUS ONCE
Status: COMPLETED | OUTPATIENT
Start: 2017-10-22 | End: 2017-10-22

## 2017-10-22 RX ORDER — KETOROLAC TROMETHAMINE 30 MG/ML
30 INJECTION, SOLUTION INTRAMUSCULAR; INTRAVENOUS ONCE
Status: COMPLETED | OUTPATIENT
Start: 2017-10-22 | End: 2017-10-22

## 2017-10-22 RX ORDER — DIPHENHYDRAMINE HYDROCHLORIDE 50 MG/ML
25 INJECTION INTRAMUSCULAR; INTRAVENOUS ONCE
Status: COMPLETED | OUTPATIENT
Start: 2017-10-22 | End: 2017-10-22

## 2017-10-22 RX ORDER — DICYCLOMINE HYDROCHLORIDE 10 MG/1
10 CAPSULE ORAL
Qty: 30 CAPSULE | Refills: 0 | Status: ON HOLD | OUTPATIENT
Start: 2017-10-22 | End: 2018-11-17

## 2017-10-22 RX ADMIN — SODIUM CHLORIDE 1000 ML: 9 INJECTION, SOLUTION INTRAVENOUS at 16:06

## 2017-10-22 RX ADMIN — DICYCLOMINE HYDROCHLORIDE 20 MG: 20 INJECTION, SOLUTION INTRAMUSCULAR at 16:05

## 2017-10-22 RX ADMIN — KETOROLAC TROMETHAMINE 30 MG: 30 INJECTION, SOLUTION INTRAMUSCULAR; INTRAVENOUS at 18:51

## 2017-10-22 RX ADMIN — PROMETHAZINE HYDROCHLORIDE 12.5 MG: 25 INJECTION, SOLUTION INTRAMUSCULAR; INTRAVENOUS at 18:50

## 2017-10-22 RX ADMIN — DIPHENHYDRAMINE HYDROCHLORIDE 25 MG: 50 INJECTION, SOLUTION INTRAMUSCULAR; INTRAVENOUS at 18:51

## 2017-10-22 RX ADMIN — ONDANSETRON 4 MG: 2 INJECTION INTRAMUSCULAR; INTRAVENOUS at 16:05

## 2017-10-22 ASSESSMENT — PAIN SCALES - GENERAL
PAINLEVEL_OUTOF10: 10
PAINLEVEL_OUTOF10: 10

## 2017-10-22 ASSESSMENT — ENCOUNTER SYMPTOMS
ABDOMINAL PAIN: 1
ALLERGIC/IMMUNOLOGIC NEGATIVE: 1
RESPIRATORY NEGATIVE: 1
EYES NEGATIVE: 1
VOMITING: 1
NAUSEA: 1

## 2017-10-22 ASSESSMENT — PAIN DESCRIPTION - LOCATION: LOCATION: ABDOMEN

## 2017-10-22 ASSESSMENT — PAIN DESCRIPTION - FREQUENCY: FREQUENCY: CONTINUOUS

## 2017-10-22 ASSESSMENT — PAIN DESCRIPTION - ONSET: ONSET: ON-GOING

## 2017-10-22 ASSESSMENT — PAIN DESCRIPTION - PROGRESSION: CLINICAL_PROGRESSION: NOT CHANGED

## 2017-10-22 ASSESSMENT — PAIN DESCRIPTION - PAIN TYPE: TYPE: CHRONIC PAIN;SURGICAL PAIN

## 2017-10-22 ASSESSMENT — PAIN DESCRIPTION - DESCRIPTORS: DESCRIPTORS: CRAMPING

## 2017-10-22 NOTE — ED PROVIDER NOTES
provider listed on their discharge instructions or a physician of their choice this week to arrange follow up for further evaluation of possible pre-hypertension or Hypertension. (Please note that portions of this note were completed with a voice recognition program.  Efforts were made to edit the dictations but occasionally words are mis-transcribed. )    Rain Asher MD  Attending Emergency Medicine Physician              Oli Castano MD  10/22/17 2110
exhibits no mass. There is tenderness. There is no rebound and no guarding. Morbidly obese abdomen with several surgical scars that are well-healed. There is diffuse epigastric tenderness. No suprapubic tenderness. No flank tenderness. No rebound or guarding. No hepatosplenomegaly   Musculoskeletal: Normal range of motion. Extremities or more of the obese. The calves are soft, nontender, nonedematous bilaterally   Neurological: She is alert and oriented to person, place, and time. She has normal strength. GCS eye subscore is 4. GCS verbal subscore is 5. GCS motor subscore is 6. Skin: Skin is warm and intact. Psychiatric: She has a normal mood and affect. Her speech is normal and behavior is normal. Judgment and thought content normal.   Nursing note and vitals reviewed. DIFFERENTIAL  DIAGNOSIS   Chronic abdominal pain with nausea and vomiting. Gastritis. Viral syndrome. Small bowel obstruction  Hiatal hernia.     PLAN (LABS / IMAGING / EKG):  Orders Placed This Encounter   Procedures    XR Acute Abd Series Chest 1 VW    CBC WITH AUTO DIFFERENTIAL    Comprehensive Metabolic Panel    LIPASE    Urinalysis with microscopic       MEDICATIONS ORDERED:  Orders Placed This Encounter   Medications    ondansetron (ZOFRAN) injection 4 mg    dicyclomine (BENTYL) injection 20 mg    0.9 % sodium chloride bolus    promethazine (PHENERGAN) injection 12.5 mg    diphenhydrAMINE (BENADRYL) injection 25 mg    ketorolac (TORADOL) injection 30 mg    dicyclomine (BENTYL) 10 MG capsule     Sig: Take 1 capsule by mouth 4 times daily (before meals and nightly)     Dispense:  30 capsule     Refill:  0       Controlled Substances Monitoring:      DIAGNOSTIC RESULTS / EMERGENCY DEPARTMENT COURSE / MDM     RADIOLOGY:   I directly visualized (with the attending physician) the following  images and reviewed the radiologist interpretations:  Xr Acute Abd Series Chest 1 Vw    Result Date: 9/25/2017  EXAMINATION:

## 2017-10-23 ENCOUNTER — HOSPITAL ENCOUNTER (EMERGENCY)
Age: 37
Discharge: HOME | End: 2017-10-24
Attending: EMERGENCY MEDICINE
Payer: MEDICARE

## 2017-10-23 VITALS
DIASTOLIC BLOOD PRESSURE: 104 MMHG | SYSTOLIC BLOOD PRESSURE: 147 MMHG | OXYGEN SATURATION: 99 % | RESPIRATION RATE: 18 BRPM | HEART RATE: 98 BPM | TEMPERATURE: 97.3 F

## 2017-10-23 DIAGNOSIS — R10.13 EPIGASTRIC PAIN: Primary | ICD-10-CM

## 2017-10-23 PROCEDURE — 86403 PARTICLE AGGLUT ANTBDY SCRN: CPT

## 2017-10-23 PROCEDURE — 87086 URINE CULTURE/COLONY COUNT: CPT

## 2017-10-23 PROCEDURE — 80076 HEPATIC FUNCTION PANEL: CPT

## 2017-10-23 PROCEDURE — 81001 URINALYSIS AUTO W/SCOPE: CPT

## 2017-10-23 PROCEDURE — 83690 ASSAY OF LIPASE: CPT

## 2017-10-23 PROCEDURE — 80048 BASIC METABOLIC PNL TOTAL CA: CPT

## 2017-10-23 PROCEDURE — 85025 COMPLETE CBC W/AUTO DIFF WBC: CPT

## 2017-10-23 PROCEDURE — 99284 EMERGENCY DEPT VISIT MOD MDM: CPT

## 2017-10-23 RX ORDER — KETOROLAC TROMETHAMINE 15 MG/ML
15 INJECTION, SOLUTION INTRAMUSCULAR; INTRAVENOUS ONCE
Status: COMPLETED | OUTPATIENT
Start: 2017-10-23 | End: 2017-10-24

## 2017-10-23 RX ORDER — 0.9 % SODIUM CHLORIDE 0.9 %
1000 INTRAVENOUS SOLUTION INTRAVENOUS ONCE
Status: COMPLETED | OUTPATIENT
Start: 2017-10-23 | End: 2017-10-24

## 2017-10-24 LAB
-: ABNORMAL
ABSOLUTE EOS #: 0.1 K/UL (ref 0–0.4)
ABSOLUTE IMMATURE GRANULOCYTE: NORMAL K/UL (ref 0–0.3)
ABSOLUTE LYMPH #: 2.3 K/UL (ref 1–4.8)
ABSOLUTE MONO #: 0.6 K/UL (ref 0.1–1.2)
ALBUMIN SERPL-MCNC: 3.4 G/DL (ref 3.5–5.2)
ALBUMIN/GLOBULIN RATIO: 1 (ref 1–2.5)
ALP BLD-CCNC: 101 U/L (ref 35–104)
ALT SERPL-CCNC: 17 U/L (ref 5–33)
AMORPHOUS: ABNORMAL
ANION GAP SERPL CALCULATED.3IONS-SCNC: 13 MMOL/L (ref 9–17)
AST SERPL-CCNC: 21 U/L
BACTERIA: ABNORMAL
BASOPHILS # BLD: 1 %
BASOPHILS ABSOLUTE: 0 K/UL (ref 0–0.2)
BILIRUB SERPL-MCNC: 0.17 MG/DL (ref 0.3–1.2)
BILIRUBIN DIRECT: <0.08 MG/DL
BILIRUBIN URINE: NEGATIVE
BILIRUBIN, INDIRECT: ABNORMAL MG/DL (ref 0–1)
BUN BLDV-MCNC: 17 MG/DL (ref 6–20)
BUN/CREAT BLD: ABNORMAL (ref 9–20)
CALCIUM SERPL-MCNC: 8.7 MG/DL (ref 8.6–10.4)
CASTS UA: ABNORMAL /LPF (ref 0–8)
CHLORIDE BLD-SCNC: 102 MMOL/L (ref 98–107)
CO2: 23 MMOL/L (ref 20–31)
COLOR: YELLOW
COMMENT UA: ABNORMAL
CREAT SERPL-MCNC: 0.49 MG/DL (ref 0.5–0.9)
CRYSTALS, UA: ABNORMAL /HPF
DIFFERENTIAL TYPE: NORMAL
EOSINOPHILS RELATIVE PERCENT: 2 %
EPITHELIAL CELLS UA: ABNORMAL /HPF (ref 0–5)
GFR AFRICAN AMERICAN: >60 ML/MIN
GFR NON-AFRICAN AMERICAN: >60 ML/MIN
GFR SERPL CREATININE-BSD FRML MDRD: ABNORMAL ML/MIN/{1.73_M2}
GFR SERPL CREATININE-BSD FRML MDRD: ABNORMAL ML/MIN/{1.73_M2}
GLOBULIN: ABNORMAL G/DL (ref 1.5–3.8)
GLUCOSE BLD-MCNC: 114 MG/DL (ref 70–99)
GLUCOSE URINE: NEGATIVE
HCT VFR BLD CALC: 40.3 % (ref 36–46)
HEMOGLOBIN: 13.6 G/DL (ref 12–16)
IMMATURE GRANULOCYTES: NORMAL %
KETONES, URINE: NEGATIVE
LEUKOCYTE ESTERASE, URINE: ABNORMAL
LIPASE: 17 U/L (ref 13–60)
LYMPHOCYTES # BLD: 36 %
MCH RBC QN AUTO: 30.4 PG (ref 26–34)
MCHC RBC AUTO-ENTMCNC: 33.7 G/DL (ref 31–37)
MCV RBC AUTO: 90.1 FL (ref 80–100)
MONOCYTES # BLD: 10 %
MUCUS: ABNORMAL
NITRITE, URINE: NEGATIVE
OTHER OBSERVATIONS UA: ABNORMAL
PDW BLD-RTO: 14 % (ref 12.5–15.4)
PH UA: 5.5 (ref 5–8)
PLATELET # BLD: 208 K/UL (ref 140–450)
PLATELET ESTIMATE: NORMAL
PMV BLD AUTO: 10.5 FL (ref 6–12)
POTASSIUM SERPL-SCNC: 4.9 MMOL/L (ref 3.7–5.3)
PROTEIN UA: NEGATIVE
RBC # BLD: 4.48 M/UL (ref 4–5.2)
RBC # BLD: NORMAL 10*6/UL
RBC UA: ABNORMAL /HPF (ref 0–4)
RENAL EPITHELIAL, UA: ABNORMAL /HPF
SEG NEUTROPHILS: 51 %
SEGMENTED NEUTROPHILS ABSOLUTE COUNT: 3.2 K/UL (ref 1.8–7.7)
SODIUM BLD-SCNC: 138 MMOL/L (ref 135–144)
SPECIFIC GRAVITY UA: 1.01 (ref 1–1.03)
TOTAL PROTEIN: 6.7 G/DL (ref 6.4–8.3)
TRICHOMONAS: ABNORMAL
TURBIDITY: CLEAR
URINE HGB: NEGATIVE
UROBILINOGEN, URINE: NORMAL
WBC # BLD: 6.3 K/UL (ref 3.5–11)
WBC # BLD: NORMAL 10*3/UL
WBC UA: ABNORMAL /HPF (ref 0–5)
YEAST: ABNORMAL

## 2017-10-24 PROCEDURE — 6360000002 HC RX W HCPCS: Performed by: EMERGENCY MEDICINE

## 2017-10-24 PROCEDURE — 96372 THER/PROPH/DIAG INJ SC/IM: CPT

## 2017-10-24 PROCEDURE — 96374 THER/PROPH/DIAG INJ IV PUSH: CPT

## 2017-10-24 PROCEDURE — 2580000003 HC RX 258: Performed by: EMERGENCY MEDICINE

## 2017-10-24 RX ORDER — PROMETHAZINE HYDROCHLORIDE 25 MG/ML
25 INJECTION, SOLUTION INTRAMUSCULAR; INTRAVENOUS ONCE
Status: COMPLETED | OUTPATIENT
Start: 2017-10-24 | End: 2017-10-24

## 2017-10-24 RX ADMIN — SODIUM CHLORIDE 1000 ML: 9 INJECTION, SOLUTION INTRAVENOUS at 00:50

## 2017-10-24 RX ADMIN — KETOROLAC TROMETHAMINE 15 MG: 15 INJECTION, SOLUTION INTRAMUSCULAR; INTRAVENOUS at 00:50

## 2017-10-24 RX ADMIN — PROMETHAZINE HYDROCHLORIDE 25 MG: 25 INJECTION INTRAMUSCULAR; INTRAVENOUS at 00:50

## 2017-10-24 ASSESSMENT — PAIN SCALES - GENERAL: PAINLEVEL_OUTOF10: 9

## 2017-10-24 ASSESSMENT — ENCOUNTER SYMPTOMS
RHINORRHEA: 0
VOMITING: 1
NAUSEA: 1
SHORTNESS OF BREATH: 0
ABDOMINAL PAIN: 1

## 2017-10-24 NOTE — ED PROVIDER NOTES
capsule Take 1 capsule by mouth 4 times daily (before meals and nightly) 10/22/17  Yes Elbert Dempsey PA-C   acetaminophen (APAP EXTRA STRENGTH) 500 MG tablet Take 1 tablet by mouth every 4 hours as needed for Pain (Take when out of Norco or when the pain is less severe, do not take at the same time as both contain acetaminophen) 9/22/17  Yes Romeo Neff, DO   traMADol (ULTRAM) 50 MG tablet Take 1 tablet by mouth every 6 hours as needed for Pain 9/18/17  Yes Alexander Crowder PA-C   ondansetron (ZOFRAN) 4 MG tablet Take 1 tablet by mouth every 8 hours as needed for Nausea 9/11/17  Yes Della Monge, DO   ondansetron (ZOFRAN ODT) 4 MG disintegrating tablet Take 1 tablet by mouth every 8 hours as needed for Nausea 8/22/17  Yes Laura Bustamante MD   tiZANidine (ZANAFLEX) 4 MG tablet Take 1 tablet by mouth every 6 hours as needed (pain/spasm) 7/2/17  Yes Theta Neat, DO   ibuprofen (ADVIL;MOTRIN) 800 MG tablet Take 1 tablet by mouth every 8 hours as needed for Pain 7/2/17  Yes Theta Neat, DO   ondansetron (ZOFRAN ODT) 4 MG disintegrating tablet Take 1 tablet by mouth every 8 hours as needed for Nausea 6/2/17  Yes Emanuel Brower MD   zolpidem (AMBIEN) 5 MG tablet Take 5 mg by mouth nightly as needed for Sleep   Yes Historical Provider, MD   LORazepam (ATIVAN) 1 MG tablet Take 1 mg by mouth 2 times daily as needed for Anxiety   Yes Historical Provider, MD   LORazepam (ATIVAN) 2 MG tablet Take 2 mg by mouth nightly as needed for Anxiety   Yes Historical Provider, MD   esomeprazole Magnesium (NEXIUM) 40 MG PACK Take 40 mg by mouth daily   Yes Historical Provider, MD   diltiazem (CARDIZEM CD) 240 MG extended release capsule Take 1 capsule by mouth daily 4/10/17  Yes Aden Leon MD   DULoxetine (CYMBALTA) 60 MG extended release capsule Take 1 capsule by mouth daily 4/10/17  Yes Aden Leon MD   lisinopril (PRINIVIL;ZESTRIL) 5 MG tablet Take 1 tablet by mouth Daily with lunch 4/11/17  Yes Sukhdev Mojica MD   magnesium oxide (MAG-OX) 400 (241.3 MG) MG TABS tablet Take 1 tablet by mouth 2 times daily 4/10/17  Yes Sukhdev Mojica MD   silver sulfADIAZINE (SILVADENE) 1 % cream Apply topically daily. 3/9/17  Yes Isa Bruce PA-C   Menthol, Topical Analgesic, 5 % PADS Apply to the chest wall, as needed for pain, daily. 1/9/17  Yes Nabil Dempsey PA-C   vitamin D (CHOLECALCIFEROL) 1000 UNIT TABS tablet Take 10,000 Units by mouth daily None for about 1 week, states not given while in hospital   Yes Historical Provider, MD   fondaparinux (ARIXTRA) 10 MG/0.8ML SOLN injection INJECT 0.8MLS INTO THE SKIN DAILY 10/5/16  Yes Dilcia Ruelas MD   lidocaine (XYLOCAINE) 5 % ointment Apply topically as needed. 7/7/16  Yes Dilcia Ruelas MD   insulin aspart (NOVOLOG FLEXPEN) 100 UNIT/ML injection pen Inject 5 Units into the skin 3 times daily (before meals) Hold if sugar below 100 3/4/16  Yes Katy Regalado MD   Lancets MISC 1 each by Other route 3 times daily 3/4/16  Yes Katy Regalado MD   bumetanide (BUMEX) 2 MG tablet Take 1 tablet by mouth daily  Patient taking differently: Take 2 mg by mouth daily None for more than 1 week 3/4/16  Yes Katy Regalado MD   Insulin Pen Needle 31G X 6 MM MISC 1 each by Does not apply route 2 times daily 12/8/15  Yes Dilcia Ruelas MD   glucose monitoring kit (FREESTYLE) monitoring kit 1 kit by Does not apply route daily as needed 12/1/15  Yes Dilcia Ruelas MD   insulin glargine (LANTUS) 100 UNIT/ML injection vial Inject 30 Units into the skin nightly 4/10/17 5/10/17  Sukhdev Mojica MD   DULoxetine HCl 40 MG CPEP Take 40 mg by mouth daily Do not crush or break. May add contents of capsule to apple juice or apple sauce, but not chocolate.   Patient not taking: Reported on 4/26/2017 9/29/15 10/29/15  Alena Webb MD       REVIEW OF SYSTEMS    (2-9 systems for level 4, 10 or more for level 5)      Review of Systems   Constitutional: Negative for chills and fever. HENT: Negative for congestion and rhinorrhea. Eyes: Negative for visual disturbance. Respiratory: Negative for shortness of breath. Cardiovascular: Negative for chest pain. Gastrointestinal: Positive for abdominal pain, nausea and vomiting. Genitourinary: Negative for dysuria and frequency. Musculoskeletal: Negative for arthralgias. Skin: Negative for wound. Neurological: Negative for dizziness and light-headedness. PHYSICAL EXAM   (up to 7 for level 4, 8 or more for level 5)      INITIAL VITALS:   BP (!) 147/104   Pulse 98   Temp 97.3 °F (36.3 °C) (Oral)   Resp 18   SpO2 99%     Physical Exam   Constitutional: She is oriented to person, place, and time. No distress. HENT:   Head: Normocephalic and atraumatic. Eyes: Right eye exhibits no discharge. Left eye exhibits no discharge. Cardiovascular: Normal rate, regular rhythm and normal heart sounds. Exam reveals no friction rub. No murmur heard. Pulmonary/Chest: Effort normal and breath sounds normal. No stridor. No respiratory distress. She has no wheezes. She has no rales. She exhibits no tenderness. Abdominal: Soft. She exhibits no distension. There is tenderness in the epigastric area. There is no guarding. Neurological: She is alert and oriented to person, place, and time. Skin: Skin is warm and dry. She is not diaphoretic. Nursing note and vitals reviewed.     DIFFERENTIAL  DIAGNOSIS     PLAN (LABS / IMAGING / EKG):  Orders Placed This Encounter   Procedures    CBC Auto Differential    Hepatic Function Panel    Lipase    UA W/REFLEX CULTURE    Basic Metabolic Panel    Microscopic Urinalysis       MEDICATIONS ORDERED:  Orders Placed This Encounter   Medications    0.9 % sodium chloride bolus    ketorolac (TORADOL) injection 15 mg    promethazine (PHENERGAN) injection 25 mg       DIAGNOSTIC RESULTS / EMERGENCY DEPARTMENT COURSE / MDM     LABS:  Results for orders placed or performed during the hospital encounter of 10/23/17   CBC Auto Differential   Result Value Ref Range    WBC 6.3 3.5 - 11.0 k/uL    RBC 4.48 4.0 - 5.2 m/uL    Hemoglobin 13.6 12.0 - 16.0 g/dL    Hematocrit 40.3 36 - 46 %    MCV 90.1 80 - 100 fL    MCH 30.4 26 - 34 pg    MCHC 33.7 31 - 37 g/dL    RDW 14.0 12.5 - 15.4 %    Platelets 945 596 - 274 k/uL    MPV 10.5 6.0 - 12.0 fL    Differential Type NOT REPORTED     Immature Granulocytes NOT REPORTED 0 %    Absolute Immature Granulocyte NOT REPORTED 0.00 - 0.30 k/uL    WBC Morphology NOT REPORTED     RBC Morphology NOT REPORTED     Platelet Estimate NOT REPORTED     Seg Neutrophils 51 %    Lymphocytes 36 %    Monocytes 10 %    Eosinophils % 2 %    Basophils 1 %    Segs Absolute 3.20 1.8 - 7.7 k/uL    Absolute Lymph # 2.30 1.0 - 4.8 k/uL    Absolute Mono # 0.60 0.1 - 1.2 k/uL    Absolute Eos # 0.10 0.0 - 0.4 k/uL    Basophils # 0.00 0.0 - 0.2 k/uL   Hepatic Function Panel   Result Value Ref Range    Alb 3.4 (L) 3.5 - 5.2 g/dL    Alkaline Phosphatase 101 35 - 104 U/L    ALT 17 5 - 33 U/L    AST 21 <32 U/L    Total Bilirubin 0.17 (L) 0.3 - 1.2 mg/dL    Bilirubin, Direct <0.08 <0.31 mg/dL    Bilirubin, Indirect CANNOT BE CALCULATED 0.00 - 1.00 mg/dL    Total Protein 6.7 6.4 - 8.3 g/dL    Globulin NOT REPORTED 1.5 - 3.8 g/dL    Albumin/Globulin Ratio 1.0 1.0 - 2.5   Lipase   Result Value Ref Range    Lipase 17 13 - 60 U/L   UA W/REFLEX CULTURE   Result Value Ref Range    Color, UA YELLOW YEL    Turbidity UA CLEAR CLEAR    Glucose, Ur NEGATIVE NEG    Bilirubin Urine NEGATIVE NEG    Ketones, Urine NEGATIVE NEG    Specific Gravity, UA 1.013 1.005 - 1.030    Urine Hgb NEGATIVE NEG    pH, UA 5.5 5.0 - 8.0    Protein, UA NEGATIVE NEG    Urobilinogen, Urine Normal NORM    Nitrite, Urine NEGATIVE NEG    Leukocyte Esterase, Urine SMALL (A) NEG    Urinalysis Comments NOT REPORTED    Basic Metabolic Panel   Result Value Ref Range    Glucose 114 (H) 70 - 99 mg/dL    BUN 17 6 - 20 mg/dL    CREATININE 0.49 (L) 0.50 - 0.90 mg/dL    Bun/Cre Ratio NOT REPORTED 9 - 20    Calcium 8.7 8.6 - 10.4 mg/dL    Sodium 138 135 - 144 mmol/L    Potassium 4.9 3.7 - 5.3 mmol/L    Chloride 102 98 - 107 mmol/L    CO2 23 20 - 31 mmol/L    Anion Gap 13 9 - 17 mmol/L    GFR Non-African American >60 >60 mL/min    GFR African American >60 >60 mL/min    GFR Comment          GFR Staging NOT REPORTED    Microscopic Urinalysis   Result Value Ref Range    -          WBC, UA 10 TO 20 0 - 5 /HPF    RBC, UA 0 TO 2 0 - 4 /HPF    Casts UA 2 TO 5 HYALINE 0 - 8 /LPF    Crystals UA NOT REPORTED NONE /HPF    Epithelial Cells UA 10 TO 20 0 - 5 /HPF    Renal Epithelial, Urine NOT REPORTED 0 /HPF    Bacteria, UA FEW (A) NONE    Mucus, UA NOT REPORTED NONE    Trichomonas, UA NOT REPORTED NONE    Amorphous, UA NOT REPORTED NONE    Other Observations UA NOT REPORTED NREQ    Yeast, UA NOT REPORTED NONE       RADIOLOGY:  Not indicated    EMERGENCY DEPARTMENT COURSE:    MDM: Patient with acute on chronic abdominal pain. Presented yesterday for similar complaints with a negative workup. Given the patient complained of worsening symptoms obtained another abdominal workup which was negative. Patient also with negative urinalysis which was not done yesterday. Patient asking for narcotic medications and discussed with patient that narcotic medications can potentially worsen the abdominal pain. Given her history of bowel obstruction it was discussed with her that narcotic medications can cause the bowels have slowed down and cause instruction. Patient did express frustration over not getting narcotic medications and felt that she was not being treated. Discussed with patient that she will have to follow up with her surgeons in [facility] clinic and also primary care physician. During her stay here in the emergency department patient was seen ambulating without any difficulty, playing with her phone, in no acute distress at any time and no episodes of vomiting.   No other acute intervention at this time. Patient medically stable to be discharged.     PROCEDURES:  None    CONSULTS:  None    FINAL IMPRESSION      1. Epigastric pain          DISPOSITION / PLAN     DISPOSITION discharge    PATIENT REFERRED TO:  [facility]  [address] [address]  [phone]    If symptoms worsen      DISCHARGE MEDICATIONS:  New Prescriptions    No medications on file       Dolly Frias MD  Emergency Medicine Resident, PGY-2  [facility]    (Please note that portions of this note were completed with a voice recognition program.  Efforts were made to edit the dictations but occasionally words are mis-transcribed.)       Dolly Frias MD  Resident  10/24/17 2990

## 2017-10-24 NOTE — ED PROVIDER NOTES
HISTORY: Reason for exam:->abdominal pain, hx mult abd sx FINDINGS: Chest:  The mediastinal and cardiac contours are normal.  The lungs are clear. There is no pleural effusion or pneumothorax present. Abdomen: Upright and supine views of the abdomen were performed. There is no free intraperitoneal air present. No abnormally dilated loops of large or small bowel are identified. An IVC filter is noted. 1. No acute cardiopulmonary process identified. 2. Nonobstructive bowel gas pattern. RECENT VITALS:     Temp: 97.3 °F (36.3 °C),  Pulse: 98, Resp: 18, BP: (!) 147/104, SpO2: 99 %    This patient is a 44 y.o. Female with abdominal pain, chronic issue, multiple abdominal surgeries. Acute abdominal series performed yesterday unremarkable. Currently pending lab workup and symptomatic improvement. Out of Phenergan. OUTSTANDING TASKS / RECOMMENDATIONS:    1. Lab workup  2.  Symptomatic therapy      Anais Narayanan MD  Attending Emergency Physician  [facility] ED       Gege Acosta MD  10/24/17 4312

## 2017-10-24 NOTE — ED PROVIDER NOTES
[facility]  Emergency Department  Faculty Attestation     I performed a history and physical examination of the patient and discussed management with the resident. I reviewed the residents note and agree with the documented findings and plan of care. Any areas of disagreement are noted on the chart. I was personally present for the key portions of any procedures. I have documented in the chart those procedures where I was not present during the key portions. I have reviewed the emergency nurses triage note. I agree with the chief complaint, past medical history, past surgical history, allergies, medications, social and family history as documented unless otherwise noted below. For Physician Assistant/ Nurse Practitioner cases/documentation I have personally evaluated this patient and have completed at least one if not all key elements of the E/M (history, physical exam, and MDM). Additional findings are as noted. Primary Care Physician:  Jennifer Pretty CNP    Screenings:  [unfilled]    CHIEF COMPLAINT       Chief Complaint   Patient presents with    Abdominal Pain       RECENT VITALS:   Temp: 97.3 °F (36.3 °C),  Pulse: 98, Resp: 18, BP: (!) 147/104    LABS:  Labs Reviewed   CBC WITH AUTO DIFFERENTIAL   HEPATIC FUNCTION PANEL   LIPASE   UA W/REFLEX CULTURE   BASIC METABOLIC PANEL       Radiology  No orders to display       Attending Physician Additional  Notes    Patient is here for symptom relief of chronic recurrent epigastric abdominal pain and nausea and vomiting. She's had multiple abdominal surgeries initially for bariatric surgery also hepatic tumor resection, also lysis of adhesions and prior bowel obstructions. Her most recent surgeries of been in [facility], [facility] clinic. She is out of her Phenergan but still has Zofran which was ineffective. She has Bentyl which is not helping with pain. There is no blood vomiting of blood. She is passing flatus.   No UTI

## 2017-10-25 LAB
CULTURE: ABNORMAL
Lab: ABNORMAL
SPECIMEN DESCRIPTION: ABNORMAL
STATUS: ABNORMAL

## 2017-11-01 ENCOUNTER — APPOINTMENT (OUTPATIENT)
Dept: GENERAL RADIOLOGY | Age: 37
End: 2017-11-01
Payer: MEDICARE

## 2017-11-01 ENCOUNTER — HOSPITAL ENCOUNTER (EMERGENCY)
Age: 37
Discharge: HOME | End: 2017-11-01
Attending: EMERGENCY MEDICINE
Payer: MEDICARE

## 2017-11-01 VITALS
SYSTOLIC BLOOD PRESSURE: 154 MMHG | HEIGHT: 68 IN | HEART RATE: 79 BPM | WEIGHT: 293 LBS | TEMPERATURE: 98.2 F | RESPIRATION RATE: 20 BRPM | DIASTOLIC BLOOD PRESSURE: 80 MMHG | BODY MASS INDEX: 44.41 KG/M2 | OXYGEN SATURATION: 98 %

## 2017-11-01 DIAGNOSIS — S93.401A SPRAIN OF RIGHT ANKLE, UNSPECIFIED LIGAMENT, INITIAL ENCOUNTER: Primary | ICD-10-CM

## 2017-11-01 DIAGNOSIS — R03.0 ELEVATED BLOOD PRESSURE READING: ICD-10-CM

## 2017-11-01 PROCEDURE — 73630 X-RAY EXAM OF FOOT: CPT

## 2017-11-01 PROCEDURE — 73610 X-RAY EXAM OF ANKLE: CPT

## 2017-11-01 PROCEDURE — 6370000000 HC RX 637 (ALT 250 FOR IP): Performed by: PHYSICIAN ASSISTANT

## 2017-11-01 PROCEDURE — G0382 LEV 3 HOSP TYPE B ED VISIT: HCPCS

## 2017-11-01 RX ORDER — IBUPROFEN 800 MG/1
800 TABLET ORAL EVERY 8 HOURS PRN
Qty: 20 TABLET | Refills: 0 | Status: SHIPPED | OUTPATIENT
Start: 2017-11-01 | End: 2017-11-01 | Stop reason: ALTCHOICE

## 2017-11-01 RX ORDER — HYDROCODONE BITARTRATE AND ACETAMINOPHEN 5; 325 MG/1; MG/1
2 TABLET ORAL ONCE
Status: COMPLETED | OUTPATIENT
Start: 2017-11-01 | End: 2017-11-01

## 2017-11-01 RX ADMIN — HYDROCODONE BITARTRATE AND ACETAMINOPHEN 2 TABLET: 5; 325 TABLET ORAL at 16:01

## 2017-11-01 ASSESSMENT — PAIN SCALES - GENERAL: PAINLEVEL_OUTOF10: 6

## 2017-11-01 ASSESSMENT — ENCOUNTER SYMPTOMS
ABDOMINAL PAIN: 0
WHEEZING: 0
VOMITING: 0
NAUSEA: 0
COLOR CHANGE: 0
COUGH: 0

## 2017-11-01 ASSESSMENT — PAIN DESCRIPTION - LOCATION: LOCATION: ANKLE

## 2017-11-01 ASSESSMENT — PAIN DESCRIPTION - DESCRIPTORS: DESCRIPTORS: SHARP;BURNING

## 2017-11-01 ASSESSMENT — PAIN DESCRIPTION - ORIENTATION: ORIENTATION: RIGHT

## 2017-11-01 ASSESSMENT — PAIN DESCRIPTION - PAIN TYPE: TYPE: ACUTE PAIN

## 2017-11-01 ASSESSMENT — PAIN DESCRIPTION - FREQUENCY: FREQUENCY: CONTINUOUS

## 2017-11-01 NOTE — ED NOTES
Pt to ED via w/c to room 35 with c/o right ankle pain since 0645 today. Pt states she fell while walking her children to school. Pt states she twisted her ankle and has pain on the lateral side with pain that is shoot up the back of her right calf, pt states it feels like when she had torn her achiles tendon on her other foot. Pt a/o x 4. NAD, rr even, unlabored, awaiting further orders.     SHANNAN Perez at bedside for evaluation  Ice pack given     Maria Rae RN  19/75/12 4873

## 2017-11-01 NOTE — ED PROVIDER NOTES
[address]  ED  Emergency Department Encounter  Mid Level Provider     Pt Name: Yvette Westbrook  MRN: [id number]  Brucegfalina 1980  Date of evaluation: 11/1/17  PCP:  Isabell Hernández [address] COMPLAINT       Chief Complaint   Patient presents with    Ankle Pain     Pt tripped this AM and fell into a rut twisting R ankle. Pt states it is painful and she states she feels \"popping\" in the ankle. HISTORY OF PRESENT ILLNESS  (Location/Symptom, Timing/Onset, Context/Setting, Quality, Duration, Modifying Factors, Severity.)      Yvette Westbrook is a 44 y.o. female who presents with Right foot and ankle pain. Patient states that earlier today she was walking when she inverted her ankle. Patient has been able to walk on the ankle but is concerned because it is swelling and feels similar to when she had an Achilles tendon rupture on her left side. Patient is able to move her ankle though it is painful and shoots pain up the back of her leg. She does not have any specific pain over the Achilles tendon. She has pain to the lateral malleolus and lateral portion of the foot. She denies any numbness or tingling. PAST MEDICAL / SURGICAL / SOCIAL / FAMILY HISTORY      has a past medical history of Alcohol abuse; Anxiety and depression; Arthritis; Painting esophagus; Bipolar disorder, unspecified; Broken rib; Depression; Diabetes mellitus ([address] Diabetes mellitus ([address] Embolism and thrombosis of unspecified site; Genital herpes, unspecified; GERD (gastroesophageal reflux disease); History of blood transfusion; History of hypertension; Hx of blood clots; Hypertension; Hypoglycemia, unspecified; Lumbosacral spondylosis without myelopathy; MDRO (multiple drug resistant organisms) resistance; Miscarriage; MRSA (methicillin resistant staph aureus) culture positive; Overdose of drug; Pernicious anemia; Primary hypercoagulable state ([address]  Suicide attempt; SVT (supraventricular tachycardia) tablet Take 1 tablet by mouth every 4 hours as needed for Pain (Take when out of Norco or when the pain is less severe, do not take at the same time as both contain acetaminophen) 9/22/17   Sommer Uriostegui, DO   traMADol (ULTRAM) 50 MG tablet Take 1 tablet by mouth every 6 hours as needed for Pain 9/18/17   Negrito Whyte PA-C   ondansetron (ZOFRAN) 4 MG tablet Take 1 tablet by mouth every 8 hours as needed for Nausea 9/11/17   Abraham Mccrary, DO   ondansetron (ZOFRAN ODT) 4 MG disintegrating tablet Take 1 tablet by mouth every 8 hours as needed for Nausea 8/22/17   Loida Nevarez MD   tiZANidine (ZANAFLEX) 4 MG tablet Take 1 tablet by mouth every 6 hours as needed (pain/spasm) 7/2/17   Kristi Ibarra, DO   ondansetron (ZOFRAN ODT) 4 MG disintegrating tablet Take 1 tablet by mouth every 8 hours as needed for Nausea 6/2/17   Dylon Kaiser MD   zolpidem (AMBIEN) 5 MG tablet Take 5 mg by mouth nightly as needed for Sleep    Historical Provider, MD   LORazepam (ATIVAN) 1 MG tablet Take 1 mg by mouth 2 times daily as needed for Anxiety    Historical Provider, MD   LORazepam (ATIVAN) 2 MG tablet Take 2 mg by mouth nightly as needed for Anxiety    Historical Provider, MD   esomeprazole Magnesium (NEXIUM) 40 MG PACK Take 40 mg by mouth daily    Historical Provider, MD   diltiazem (CARDIZEM CD) 240 MG extended release capsule Take 1 capsule by mouth daily 4/10/17   Cesar Pak MD   DULoxetine (CYMBALTA) 60 MG extended release capsule Take 1 capsule by mouth daily 4/10/17   Cesar Pak MD   insulin glargine (LANTUS) 100 UNIT/ML injection vial Inject 30 Units into the skin nightly 4/10/17 5/10/17  Cesar Pak MD   lisinopril (PRINIVIL;ZESTRIL) 5 MG tablet Take 1 tablet by mouth Daily with lunch 4/11/17   Cesar Pak MD   magnesium oxide (MAG-OX) 400 (241.3 MG) MG TABS tablet Take 1 tablet by mouth 2 times daily 4/10/17   Cesar Pak MD   silver sulfADIAZINE (SILVADENE) 1 % cream Apply topically daily. 3/9/17   Solo Herrera PA-C   Menthol, Topical Analgesic, 5 % PADS Apply to the chest wall, as needed for pain, daily. 1/9/17   Isa Alejandra PA-C   vitamin D (CHOLECALCIFEROL) 1000 UNIT TABS tablet Take 10,000 Units by mouth daily None for about 1 week, states not given while in hospital    Historical Provider, MD   fondaparinux (ARIXTRA) 10 MG/0.8ML SOLN injection INJECT 0.8MLS INTO THE SKIN DAILY 10/5/16   Mee Mendoza MD   lidocaine (XYLOCAINE) 5 % ointment Apply topically as needed. 7/7/16   Mee Mendoza MD   insulin aspart (NOVOLOG FLEXPEN) 100 UNIT/ML injection pen Inject 5 Units into the skin 3 times daily (before meals) Hold if sugar below 100 3/4/16   Toño Wise MD   Lancets MISC 1 each by Other route 3 times daily 3/4/16   Toño Wise MD   bumetanide (BUMEX) 2 MG tablet Take 1 tablet by mouth daily  Patient taking differently: Take 2 mg by mouth daily None for more than 1 week 3/4/16   Toño Wise MD   Insulin Pen Needle 31G X 6 MM MISC 1 each by Does not apply route 2 times daily 12/8/15   Mee Mendoza MD   glucose monitoring kit (FREESTYLE) monitoring kit 1 kit by Does not apply route daily as needed 12/1/15   Mee Mendoza MD   DULoxetine HCl 40 MG CPEP Take 40 mg by mouth daily Do not crush or break. May add contents of capsule to apple juice or apple sauce, but not chocolate. Patient not taking: Reported on 4/26/2017 9/29/15 10/29/15  Nevin Mims MD       patient's medication list has been reviewed as entered by the nursing staff. REVIEW OF SYSTEMS    (2-9 systems for level 4, 10 or more for level 5)      Review of Systems   Constitutional: Negative for chills and fever. Respiratory: Negative for cough and wheezing. Cardiovascular: Negative for chest pain. Gastrointestinal: Negative for abdominal pain, nausea and vomiting. Musculoskeletal: Positive for arthralgias and myalgias. Skin: Negative for color change and wound. FOOT RIGHT (MIN 3 VIEWS)   Final Result   No acute osseous findings. LABS:  No results found for this visit on 11/01/17. CONSULTS:  None    PROCEDURES:  None    FINAL IMPRESSION      1. Sprain of right ankle, unspecified ligament, initial encounter    2.  Elevated blood pressure reading          DISPOSITION / PLAN     DISPOSITION     PATIENT REFERRED TO:  Cem Young CNP  [address] [facility] [address] [phone]    Schedule an appointment as soon as possible for a visit       Brianda Morris DPM  [address] [place] [address]  [phone]    Schedule an appointment as soon as possible for a visit         DISCHARGE MEDICATIONS:  Discharge Medication List as of 11/1/2017  5:50 PM      START taking these medications    Details   ibuprofen (ADVIL;MOTRIN) 800 MG tablet Take 1 tablet by mouth every 8 hours as needed for Pain, Disp-20 tablet, R-0Print             Brianna Escobar PA-C   Emergency Medicine Physician Assistant    (Please note that portions of this note were completed with a voice recognition program.  Efforts were made to edit the dictations but occasionally words are mis-transcribed.)       Brianna Escobar PA-C  11/01/17 8232

## 2017-11-01 NOTE — ED PROVIDER NOTES
[facility] ED  eMERGENCY dEPARTMENT eNCOUnter   Independent Attestation     Pt Name: Sophie Aceves  MRN: [id number]  Brucegfalina 1980  Date of evaluation: 11/1/17       Sophie Aceves is a 44 y.o. female who presents with Ankle Pain (Pt tripped this AM and fell into a rut twisting R ankle. Pt states it is painful and she states she feels \"popping\" in the ankle.)      Vitals:   Vitals:    11/01/17 1535   BP: (!) 154/80   Pulse: 79   Resp: 20   Temp: 98.2 °F (36.8 °C)   TempSrc: Oral   SpO2: 98%   Weight: (!) 305 lb (138.3 kg)   Height: 5' 8\" (1.727 m)       Impression:   1. Sprain of right ankle, unspecified ligament, initial encounter    2. Elevated blood pressure reading          Based on the medical record, the care appears appropriate. I was personally available for consultation in the Emergency Department.     Marina Gee MD  Attending Emergency  Physician                Marina Gee MD  11/02/17 0103

## 2017-11-01 NOTE — ED NOTES
Pt back in ED from [address] [facility] [address] [facility], [facility]  52/36/51 1725

## 2017-11-25 ENCOUNTER — HOSPITAL ENCOUNTER (EMERGENCY)
Age: 37
Discharge: HOME | End: 2017-11-25
Attending: EMERGENCY MEDICINE
Payer: MEDICARE

## 2017-11-25 VITALS
TEMPERATURE: 98.1 F | RESPIRATION RATE: 17 BRPM | DIASTOLIC BLOOD PRESSURE: 95 MMHG | BODY MASS INDEX: 45.61 KG/M2 | OXYGEN SATURATION: 99 % | SYSTOLIC BLOOD PRESSURE: 180 MMHG | HEART RATE: 105 BPM | WEIGHT: 293 LBS

## 2017-11-25 DIAGNOSIS — M62.838 SPASM OF MUSCLE: Primary | ICD-10-CM

## 2017-11-25 PROCEDURE — 6360000002 HC RX W HCPCS: Performed by: STUDENT IN AN ORGANIZED HEALTH CARE EDUCATION/TRAINING PROGRAM

## 2017-11-25 PROCEDURE — 99283 EMERGENCY DEPT VISIT LOW MDM: CPT

## 2017-11-25 PROCEDURE — 96372 THER/PROPH/DIAG INJ SC/IM: CPT

## 2017-11-25 RX ORDER — DIAZEPAM 2 MG/1
2 TABLET ORAL EVERY 8 HOURS PRN
Qty: 9 TABLET | Refills: 0 | Status: SHIPPED | OUTPATIENT
Start: 2017-11-25 | End: 2017-12-05

## 2017-11-25 RX ORDER — ORPHENADRINE CITRATE 30 MG/ML
60 INJECTION INTRAMUSCULAR; INTRAVENOUS ONCE
Status: COMPLETED | OUTPATIENT
Start: 2017-11-25 | End: 2017-11-25

## 2017-11-25 RX ADMIN — ORPHENADRINE CITRATE 60 MG: 30 INJECTION INTRAMUSCULAR; INTRAVENOUS at 22:48

## 2017-11-25 ASSESSMENT — PAIN DESCRIPTION - PAIN TYPE: TYPE: ACUTE PAIN

## 2017-11-25 ASSESSMENT — ENCOUNTER SYMPTOMS
EYE ITCHING: 0
BACK PAIN: 0
RHINORRHEA: 0
DIARRHEA: 0
NAUSEA: 0
SHORTNESS OF BREATH: 0
COUGH: 0
VOMITING: 0
EYE REDNESS: 0
ABDOMINAL PAIN: 0

## 2017-11-25 ASSESSMENT — PAIN DESCRIPTION - ORIENTATION: ORIENTATION: RIGHT

## 2017-11-25 ASSESSMENT — PAIN DESCRIPTION - ONSET: ONSET: SUDDEN

## 2017-11-25 ASSESSMENT — PAIN DESCRIPTION - FREQUENCY: FREQUENCY: INTERMITTENT

## 2017-11-25 ASSESSMENT — PAIN DESCRIPTION - DESCRIPTORS: DESCRIPTORS: ACHING

## 2017-11-25 ASSESSMENT — PAIN DESCRIPTION - LOCATION: LOCATION: NECK;SHOULDER

## 2017-11-25 ASSESSMENT — PAIN SCALES - GENERAL: PAINLEVEL_OUTOF10: 9

## 2017-11-26 NOTE — ED PROVIDER NOTES
[address] [facility]     Emergency Department     Faculty Attestation        I performed a history and physical examination of the patient and discussed management with the resident. I reviewed the residents note and agree with the documented findings and plan of care. Any areas of disagreement are noted on the chart. I was personally present for the key portions of any procedures. I have documented in the chart those procedures where I was not present during the key portions. I have reviewed the emergency nurses triage note. I agree with the chief complaint, past medical history, past surgical history, allergies, medications, social and family history as documented unless otherwise noted below. For Physician Assistant/ Nurse Practitioner cases/documentation I have personally evaluated this patient and have completed at least one if not all key elements of the E/M (history, physical exam, and MDM). Additional findings are as noted. Vital Signs:BP: (!) 180/95  Pulse: 105  Resp: 17  Temp: 98.1 °F (36.7 °C) SpO2: 99 %  PCP:  Carmine Prince CNP    Pertinent Comments:         Critical Care  None      (Please note that portions of this note were completed with a voice recognition program. Efforts were made to edit the dictations but occasionally words are mis-transcribed.  Whenever words are used in this note in any gender, they shall be construed as though they were used in the gender appropriate to the circumstances; and whenever words are used in this note in the singular or plural form, they shall be construed as though they were used in the form appropriate to the circumstances.)    MD Tania Fajardo  Attending Emergency Medicine Physician            Seymour Soto MD  11/25/17 9953

## 2017-11-26 NOTE — ED PROVIDER NOTES
mellitus without mention of complication, not stated as uncontrolled. has a past surgical history that includes Bariatric Surgery (2013); Cholecystectomy; Hysterectomy; Liver Resection; hernia repair; Tonsillectomy; Endoscopy, colon, diagnostic; Abdominal hernia repair (2014); Abdominal hernia repair (11/3/14); Bariatric Surgery (2013); Dilation and curettage of uterus (2005); and Finger amputation (Left, 02/09/2015). Social History     Social History    Marital status:      Spouse name: N/A    Number of children: N/A    Years of education: N/A     Occupational History    Not on file. Social History Main Topics    Smoking status: Never Smoker    Smokeless tobacco: Never Used    Alcohol use No      Comment: Last alcohol use was in 12/2015    Drug use: No      Comment: Pt stole her mom's Benzo 1 yr ago. Pt said she took more than prescribed dose of Valium once in late 90's.  Sexual activity: Not Currently     Other Topics Concern    Not on file     Social History Narrative    ** Merged History Encounter **            Family History   Problem Relation Age of Onset    Depression Mother     High Blood Pressure Mother     High Cholesterol Mother     Diabetes Father     Heart Disease Father     Kidney Disease Father     High Blood Pressure Father     High Cholesterol Father        Allergies:  Betadine [povidone iodine]; Celexa [citalopram hydrobromide]; Lasix [furosemide]; Macrobid [nitrofurantoin monohyd macro]; Macrobid [nitrofurantoin]; Other; Betadine [povidone iodine]; Lithium; Paxil [paroxetine hcl]; and Tegretol [carbamazepine]    Home Medications:  Prior to Admission medications    Medication Sig Start Date End Date Taking? Authorizing Provider   diazepam (VALIUM) 2 MG tablet Take 1 tablet by mouth every 8 hours as needed for Anxiety (Pain/Spasm) .  11/25/17 12/5/17 Yes Wisam Joseph MD   dicyclomine (BENTYL) 10 MG capsule Take 1 capsule by mouth 4 times daily (before meals and nightly) 10/22/17   Paty Campos PA-C   acetaminophen (APAP EXTRA STRENGTH) 500 MG tablet Take 1 tablet by mouth every 4 hours as needed for Pain (Take when out of Norco or when the pain is less severe, do not take at the same time as both contain acetaminophen) 9/22/17   Nildaanettkarl Meals, DO   traMADol (ULTRAM) 50 MG tablet Take 1 tablet by mouth every 6 hours as needed for Pain 9/18/17   Gabino Escamilla PA-C   ondansetron (ZOFRAN) 4 MG tablet Take 1 tablet by mouth every 8 hours as needed for Nausea 9/11/17   Clarisse Fletcher, DO   ondansetron (ZOFRAN ODT) 4 MG disintegrating tablet Take 1 tablet by mouth every 8 hours as needed for Nausea 8/22/17   Dena Godfrey MD   tiZANidine (ZANAFLEX) 4 MG tablet Take 1 tablet by mouth every 6 hours as needed (pain/spasm) 7/2/17   Eduardo Marilyn, DO   ondansetron (ZOFRAN ODT) 4 MG disintegrating tablet Take 1 tablet by mouth every 8 hours as needed for Nausea 6/2/17   Hernesto Doty MD   zolpidem (AMBIEN) 5 MG tablet Take 5 mg by mouth nightly as needed for Sleep    Historical Provider, MD   LORazepam (ATIVAN) 1 MG tablet Take 1 mg by mouth 2 times daily as needed for Anxiety    Historical Provider, MD   LORazepam (ATIVAN) 2 MG tablet Take 2 mg by mouth nightly as needed for Anxiety    Historical Provider, MD   esomeprazole Magnesium (NEXIUM) 40 MG PACK Take 40 mg by mouth daily    Historical Provider, MD   diltiazem (CARDIZEM CD) 240 MG extended release capsule Take 1 capsule by mouth daily 4/10/17   Natalie Stockton MD   DULoxetine (CYMBALTA) 60 MG extended release capsule Take 1 capsule by mouth daily 4/10/17   Natalie Stockton MD   insulin glargine (LANTUS) 100 UNIT/ML injection vial Inject 30 Units into the skin nightly 4/10/17 5/10/17  Natalie Stockton MD   lisinopril (PRINIVIL;ZESTRIL) 5 MG tablet Take 1 tablet by mouth Daily with lunch 4/11/17   Natalie Stockton MD   magnesium oxide (MAG-OX) 400 (241.3 MG) MG TABS tablet Take 1 tablet by mouth 2 times daily 4/10/17   Shannan Doty MD   silver sulfADIAZINE (SILVADENE) 1 % cream Apply topically daily. 3/9/17   Luis Wilson PA-C   Menthol, Topical Analgesic, 5 % PADS Apply to the chest wall, as needed for pain, daily. 1/9/17   Job Ryder PA-C   vitamin D (CHOLECALCIFEROL) 1000 UNIT TABS tablet Take 10,000 Units by mouth daily None for about 1 week, states not given while in hospital    Historical Provider, MD   fondaparinux (ARIXTRA) 10 MG/0.8ML SOLN injection INJECT 0.8MLS INTO THE SKIN DAILY 10/5/16   Talib Jenkins MD   lidocaine (XYLOCAINE) 5 % ointment Apply topically as needed. 7/7/16   Talib Jenkins MD   insulin aspart (NOVOLOG FLEXPEN) 100 UNIT/ML injection pen Inject 5 Units into the skin 3 times daily (before meals) Hold if sugar below 100 3/4/16   Todd Zee MD   Lancets MISC 1 each by Other route 3 times daily 3/4/16   Todd Zee MD   bumetanide (BUMEX) 2 MG tablet Take 1 tablet by mouth daily  Patient taking differently: Take 2 mg by mouth daily None for more than 1 week 3/4/16   Todd Zee MD   Insulin Pen Needle 31G X 6 MM MISC 1 each by Does not apply route 2 times daily 12/8/15   Talib Jenkins MD   glucose monitoring kit (FREESTYLE) monitoring kit 1 kit by Does not apply route daily as needed 12/1/15   Talib Jenkins MD   DULoxetine HCl 40 MG CPEP Take 40 mg by mouth daily Do not crush or break. May add contents of capsule to apple juice or apple sauce, but not chocolate. Patient not taking: Reported on 4/26/2017 9/29/15 10/29/15  Georgette Bond MD       REVIEW OF SYSTEMS    (2-9 systems for level 4, 10 or more for level 5)      Review of Systems   Constitutional: Negative for chills and fever. HENT: Negative for congestion and rhinorrhea. Eyes: Negative for redness and itching. Respiratory: Negative for cough and shortness of breath. Cardiovascular: Negative for chest pain, palpitations and leg swelling.    Gastrointestinal: Negative tablet     Refill:  0       DDX: Muscle spasm, torticollis, muscle strain, meningitis     DIAGNOSTIC RESULTS / EMERGENCY DEPARTMENT COURSE / MDM     LABS:  No results found for this visit on 11/25/17. IMPRESSION/ED course: Willa Basilio is a 44 y.o. presenting with 4 day history of acute onset right-sided posterior neck pain that radiates her back. Pain is most likely due to musculoskeletal pain, trapezius muscle body is tense and in spasm, tender to touch. The patient is afebrile, hemodynamically stable. Tachycardic to 105, patient does not appear ill, possibly due to pain. At this time I do not believe that there is a bacterial infection, there is no septic arthritis evidence at the shoulder, there is no meningismus. Her neurologic exam did not show focal deficits. Patient will be treated with an injection of Norflex, be given a prescription for Valium for the next few days. She has been instructed to stop her Ativan while taking Valium the outpatient setting. I do not feel that any radiographs or laboratory evaluation is necessary, she has been give return precautions and has a primary care physician with whom to follow-up. RADIOLOGY:  None    EKG  None    All EKG's are interpreted by the Emergency Department Physician who either signs or Co-signs this chart in the absence of a cardiologist.    PROCEDURES:  None    CONSULTS:  None    CRITICAL CARE:  None    FINAL IMPRESSION      1. Spasm of muscle          DISPOSITION / PLAN     DISPOSITION Decision to Discharge    PATIENT REFERRED TO:  No follow-up provider specified. DISCHARGE MEDICATIONS:  New Prescriptions    DIAZEPAM (VALIUM) 2 MG TABLET    Take 1 tablet by mouth every 8 hours as needed for Anxiety (Pain/Spasm) .        Coral Barnard MD  Emergency Medicine Resident    (Please note that portions of this note were completed with a voice recognition program.  Efforts were made to edit the dictations but occasionally words are mis-transcribed.)       Guadalupe Fletcher MD  11/25/17 [id number]

## 2017-12-13 ENCOUNTER — HOSPITAL ENCOUNTER (EMERGENCY)
Age: 37
Discharge: HOME | End: 2017-12-14
Attending: EMERGENCY MEDICINE
Payer: MEDICARE

## 2017-12-13 ENCOUNTER — APPOINTMENT (OUTPATIENT)
Dept: GENERAL RADIOLOGY | Age: 37
End: 2017-12-13
Payer: MEDICARE

## 2017-12-13 DIAGNOSIS — M79.604 BILATERAL LEG PAIN: Primary | ICD-10-CM

## 2017-12-13 DIAGNOSIS — R07.89 CHEST PAIN, ATYPICAL: ICD-10-CM

## 2017-12-13 DIAGNOSIS — M79.605 BILATERAL LEG PAIN: Primary | ICD-10-CM

## 2017-12-13 DIAGNOSIS — R06.02 SHORTNESS OF BREATH: ICD-10-CM

## 2017-12-13 LAB
ABSOLUTE EOS #: 0.08 K/UL (ref 0–0.44)
ABSOLUTE IMMATURE GRANULOCYTE: <0.03 K/UL (ref 0–0.3)
ABSOLUTE LYMPH #: 1.83 K/UL (ref 1.1–3.7)
ABSOLUTE MONO #: 0.5 K/UL (ref 0.1–1.2)
BASOPHILS # BLD: 1 % (ref 0–2)
BASOPHILS ABSOLUTE: 0.03 K/UL (ref 0–0.2)
DIFFERENTIAL TYPE: NORMAL
EOSINOPHILS RELATIVE PERCENT: 1 % (ref 1–4)
HCT VFR BLD CALC: 45.4 % (ref 36.3–47.1)
HEMOGLOBIN: 14.9 G/DL (ref 11.9–15.1)
IMMATURE GRANULOCYTES: 0 %
LACTIC ACID, WHOLE BLOOD: 1.9 MMOL/L (ref 0.7–2.1)
LYMPHOCYTES # BLD: 30 % (ref 24–43)
MCH RBC QN AUTO: 29.9 PG (ref 25.2–33.5)
MCHC RBC AUTO-ENTMCNC: 32.8 G/DL (ref 28.4–34.8)
MCV RBC AUTO: 91.2 FL (ref 82.6–102.9)
MONOCYTES # BLD: 8 % (ref 3–12)
PDW BLD-RTO: 12.1 % (ref 11.8–14.4)
PLATELET # BLD: 248 K/UL (ref 138–453)
PLATELET ESTIMATE: NORMAL
PMV BLD AUTO: 12.4 FL (ref 8.1–13.5)
RBC # BLD: 4.98 M/UL (ref 3.95–5.11)
RBC # BLD: NORMAL 10*6/UL
SEG NEUTROPHILS: 60 % (ref 36–65)
SEGMENTED NEUTROPHILS ABSOLUTE COUNT: 3.59 K/UL (ref 1.5–8.1)
WBC # BLD: 6.1 K/UL (ref 3.5–11.3)
WBC # BLD: NORMAL 10*3/UL

## 2017-12-13 PROCEDURE — 80076 HEPATIC FUNCTION PANEL: CPT

## 2017-12-13 PROCEDURE — 80048 BASIC METABOLIC PNL TOTAL CA: CPT

## 2017-12-13 PROCEDURE — 81001 URINALYSIS AUTO W/SCOPE: CPT

## 2017-12-13 PROCEDURE — 93005 ELECTROCARDIOGRAM TRACING: CPT

## 2017-12-13 PROCEDURE — 71020 XR CHEST STANDARD TWO VW: CPT

## 2017-12-13 PROCEDURE — 87086 URINE CULTURE/COLONY COUNT: CPT

## 2017-12-13 PROCEDURE — 85610 PROTHROMBIN TIME: CPT

## 2017-12-13 PROCEDURE — 85730 THROMBOPLASTIN TIME PARTIAL: CPT

## 2017-12-13 PROCEDURE — 85025 COMPLETE CBC W/AUTO DIFF WBC: CPT

## 2017-12-13 PROCEDURE — 87040 BLOOD CULTURE FOR BACTERIA: CPT

## 2017-12-13 PROCEDURE — 85379 FIBRIN DEGRADATION QUANT: CPT

## 2017-12-13 PROCEDURE — 99285 EMERGENCY DEPT VISIT HI MDM: CPT

## 2017-12-13 PROCEDURE — 83605 ASSAY OF LACTIC ACID: CPT

## 2017-12-13 RX ORDER — MORPHINE SULFATE 4 MG/ML
4 INJECTION, SOLUTION INTRAMUSCULAR; INTRAVENOUS ONCE
Status: COMPLETED | OUTPATIENT
Start: 2017-12-13 | End: 2017-12-14

## 2017-12-13 RX ORDER — 0.9 % SODIUM CHLORIDE 0.9 %
1000 INTRAVENOUS SOLUTION INTRAVENOUS ONCE
Status: COMPLETED | OUTPATIENT
Start: 2017-12-13 | End: 2017-12-14

## 2017-12-13 ASSESSMENT — ENCOUNTER SYMPTOMS
VOMITING: 1
CHEST TIGHTNESS: 1
SHORTNESS OF BREATH: 1
DIARRHEA: 0
BLOOD IN STOOL: 0
SORE THROAT: 0
COLOR CHANGE: 0
COUGH: 1
ABDOMINAL PAIN: 0

## 2017-12-13 ASSESSMENT — PAIN DESCRIPTION - DESCRIPTORS: DESCRIPTORS: CRAMPING;CRUSHING;SHARP

## 2017-12-13 ASSESSMENT — PAIN DESCRIPTION - LOCATION: LOCATION: LEG

## 2017-12-13 ASSESSMENT — PAIN DESCRIPTION - FREQUENCY: FREQUENCY: CONTINUOUS

## 2017-12-13 ASSESSMENT — PAIN DESCRIPTION - ORIENTATION: ORIENTATION: RIGHT

## 2017-12-13 ASSESSMENT — PAIN SCALES - GENERAL: PAINLEVEL_OUTOF10: 8

## 2017-12-14 VITALS
TEMPERATURE: 98 F | HEIGHT: 68 IN | BODY MASS INDEX: 42.44 KG/M2 | WEIGHT: 280 LBS | DIASTOLIC BLOOD PRESSURE: 86 MMHG | RESPIRATION RATE: 16 BRPM | SYSTOLIC BLOOD PRESSURE: 109 MMHG | HEART RATE: 99 BPM | OXYGEN SATURATION: 96 %

## 2017-12-14 LAB
ALBUMIN SERPL-MCNC: 3.5 G/DL (ref 3.5–5.2)
ALBUMIN/GLOBULIN RATIO: 1 (ref 1–2.5)
ALP BLD-CCNC: 120 U/L (ref 35–104)
ALT SERPL-CCNC: 12 U/L (ref 5–33)
ANION GAP SERPL CALCULATED.3IONS-SCNC: 12 MMOL/L (ref 9–17)
AST SERPL-CCNC: 10 U/L
BILIRUB SERPL-MCNC: 0.17 MG/DL (ref 0.3–1.2)
BILIRUBIN DIRECT: <0.08 MG/DL
BILIRUBIN, INDIRECT: ABNORMAL MG/DL (ref 0–1)
BUN BLDV-MCNC: 12 MG/DL (ref 6–20)
BUN/CREAT BLD: ABNORMAL (ref 9–20)
CALCIUM SERPL-MCNC: 8.8 MG/DL (ref 8.6–10.4)
CHLORIDE BLD-SCNC: 102 MMOL/L (ref 98–107)
CO2: 24 MMOL/L (ref 20–31)
CREAT SERPL-MCNC: 0.6 MG/DL (ref 0.5–0.9)
CULTURE: NORMAL
CULTURE: NORMAL
D-DIMER QUANTITATIVE: 0.2 MG/L FEU
EKG ATRIAL RATE: 111 BPM
EKG ATRIAL RATE: 118 BPM
EKG P AXIS: 49 DEGREES
EKG P AXIS: 61 DEGREES
EKG P-R INTERVAL: 146 MS
EKG P-R INTERVAL: 150 MS
EKG Q-T INTERVAL: 350 MS
EKG Q-T INTERVAL: 364 MS
EKG QRS DURATION: 88 MS
EKG QRS DURATION: 92 MS
EKG QTC CALCULATION (BAZETT): 490 MS
EKG QTC CALCULATION (BAZETT): 495 MS
EKG R AXIS: 28 DEGREES
EKG R AXIS: 45 DEGREES
EKG T AXIS: -10 DEGREES
EKG T AXIS: -70 DEGREES
EKG VENTRICULAR RATE: 111 BPM
EKG VENTRICULAR RATE: 118 BPM
GFR AFRICAN AMERICAN: >60 ML/MIN
GFR NON-AFRICAN AMERICAN: >60 ML/MIN
GFR SERPL CREATININE-BSD FRML MDRD: ABNORMAL ML/MIN/{1.73_M2}
GFR SERPL CREATININE-BSD FRML MDRD: ABNORMAL ML/MIN/{1.73_M2}
GLOBULIN: ABNORMAL G/DL (ref 1.5–3.8)
GLUCOSE BLD-MCNC: 269 MG/DL (ref 65–105)
GLUCOSE BLD-MCNC: 433 MG/DL (ref 70–99)
INR BLD: 0.9
LACTIC ACID, WHOLE BLOOD: 2 MMOL/L (ref 0.7–2.1)
Lab: NORMAL
PARTIAL THROMBOPLASTIN TIME: 25.3 SEC (ref 21.3–31.3)
POC TROPONIN I: 0 NG/ML (ref 0–0.1)
POC TROPONIN I: 0 NG/ML (ref 0–0.1)
POC TROPONIN INTERP: NORMAL
POC TROPONIN INTERP: NORMAL
POTASSIUM SERPL-SCNC: 4.5 MMOL/L (ref 3.7–5.3)
PROTHROMBIN TIME: 9.6 SEC (ref 9.4–12.6)
SODIUM BLD-SCNC: 138 MMOL/L (ref 135–144)
SPECIMEN DESCRIPTION: NORMAL
STATUS: NORMAL
TOTAL PROTEIN: 6.9 G/DL (ref 6.4–8.3)

## 2017-12-14 PROCEDURE — 6360000002 HC RX W HCPCS: Performed by: EMERGENCY MEDICINE

## 2017-12-14 PROCEDURE — 96374 THER/PROPH/DIAG INJ IV PUSH: CPT

## 2017-12-14 PROCEDURE — 2580000003 HC RX 258: Performed by: EMERGENCY MEDICINE

## 2017-12-14 PROCEDURE — 82947 ASSAY GLUCOSE BLOOD QUANT: CPT

## 2017-12-14 PROCEDURE — 93005 ELECTROCARDIOGRAM TRACING: CPT

## 2017-12-14 PROCEDURE — 84484 ASSAY OF TROPONIN QUANT: CPT

## 2017-12-14 PROCEDURE — 83605 ASSAY OF LACTIC ACID: CPT

## 2017-12-14 PROCEDURE — 96372 THER/PROPH/DIAG INJ SC/IM: CPT

## 2017-12-14 PROCEDURE — 6370000000 HC RX 637 (ALT 250 FOR IP): Performed by: EMERGENCY MEDICINE

## 2017-12-14 RX ORDER — 0.9 % SODIUM CHLORIDE 0.9 %
1000 INTRAVENOUS SOLUTION INTRAVENOUS ONCE
Status: COMPLETED | OUTPATIENT
Start: 2017-12-14 | End: 2017-12-14

## 2017-12-14 RX ORDER — LEVOFLOXACIN 750 MG/1
750 TABLET ORAL DAILY
Qty: 5 TABLET | Refills: 0 | Status: SHIPPED | OUTPATIENT
Start: 2017-12-14 | End: 2017-12-19

## 2017-12-14 RX ORDER — IBUPROFEN 800 MG/1
800 TABLET ORAL EVERY 8 HOURS PRN
Qty: 30 TABLET | Refills: 0 | Status: SHIPPED | OUTPATIENT
Start: 2017-12-14 | End: 2017-12-14 | Stop reason: ALTCHOICE

## 2017-12-14 RX ORDER — HYDROCODONE BITARTRATE AND ACETAMINOPHEN 5; 325 MG/1; MG/1
2 TABLET ORAL ONCE
Status: COMPLETED | OUTPATIENT
Start: 2017-12-14 | End: 2017-12-14

## 2017-12-14 RX ORDER — MORPHINE SULFATE 4 MG/ML
4 INJECTION, SOLUTION INTRAMUSCULAR; INTRAVENOUS ONCE
Status: DISCONTINUED | OUTPATIENT
Start: 2017-12-14 | End: 2017-12-14

## 2017-12-14 RX ORDER — TRAMADOL HYDROCHLORIDE 50 MG/1
50 TABLET ORAL EVERY 6 HOURS PRN
Qty: 10 TABLET | Refills: 0 | Status: ON HOLD | OUTPATIENT
Start: 2017-12-14 | End: 2018-11-17

## 2017-12-14 RX ADMIN — HYDROCODONE BITARTRATE AND ACETAMINOPHEN 2 TABLET: 5; 325 TABLET ORAL at 02:38

## 2017-12-14 RX ADMIN — SODIUM CHLORIDE 1000 ML: 9 INJECTION, SOLUTION INTRAVENOUS at 00:01

## 2017-12-14 RX ADMIN — SODIUM CHLORIDE 1000 ML: 9 INJECTION, SOLUTION INTRAVENOUS at 01:26

## 2017-12-14 RX ADMIN — MORPHINE SULFATE 4 MG: 4 INJECTION INTRAVENOUS at 00:02

## 2017-12-14 RX ADMIN — INSULIN LISPRO 6 UNITS: 100 INJECTION, SOLUTION INTRAVENOUS; SUBCUTANEOUS at 01:25

## 2017-12-14 ASSESSMENT — PAIN SCALES - GENERAL
PAINLEVEL_OUTOF10: 10
PAINLEVEL_OUTOF10: 9

## 2017-12-14 ASSESSMENT — HEART SCORE: ECG: 2

## 2017-12-14 NOTE — ED NOTES
Pt medicated, second bolus started. Pt updated on poc, verbalized understanding. Denied any other needs at this time.       Linwood France RN  12/14/17 9529

## 2017-12-14 NOTE — ED PROVIDER NOTES
[facility]  Emergency Department  Faculty Attestation     I performed a history and physical examination of the patient and discussed management with the resident. I reviewed the residents note and agree with the documented findings and plan of care. Any areas of disagreement are noted on the chart. I was personally present for the key portions of any procedures. I have documented in the chart those procedures where I was not present during the key portions. I have reviewed the emergency nurses triage note. I agree with the chief complaint, past medical history, past surgical history, allergies, medications, social and family history as documented unless otherwise noted below. For Physician Assistant/ Nurse Practitioner cases/documentation I have personally evaluated this patient and have completed at least one if not all key elements of the E/M (history, physical exam, and MDM). Additional findings are as noted.       Primary Care Physician:  Alex Barksdale CNP    Screenings:  [unfilled]    CHIEF COMPLAINT       Chief Complaint   Patient presents with    Leg Pain     right side x6 days, hx of DVT in rt leg       RECENT VITALS:   Temp: 98 °F (36.7 °C),  Pulse: 129, Resp: 18, BP: (!) 147/89    LABS:  Labs Reviewed   CULTURE BLOOD #1   URINE CULTURE   CBC WITH AUTO DIFFERENTIAL   LACTIC ACID, WHOLE BLOOD   LACTIC ACID, WHOLE BLOOD   URINALYSIS WITH MICROSCOPIC   APTT   PROTIME-INR   BASIC METABOLIC PANEL   HEPATIC FUNCTION PANEL   D-DIMER, QUANTITATIVE   POCT TROPONIN   POCT TROPONIN       Radiology  XR CHEST STANDARD (2 VW)    (Results Pending)       EKG:  EKG Interpretation    Interpreted by me    Rhythm: normal sinus   Rate: tachycardic  Axis: normal  Ectopy: none  Conduction: normal  ST Segments: depression V3-6, minimally inferiorly  T Waves: inversion inferolaterally  Q Waves: none  P-pulmonale    Clinical Impression:nonspecific, possible myocardial ischemia, minimal changes from prior ECG    Attending Physician Additional  Notes    Patient complains of cough shortness of breath and brian sputum. There is right sided axillary pleuritic rib pain. She has no faintness fevers or chills. She has bilateral leg pain but no true swelling. She has had multiple DVTs and PEs and other clots elsewhere due to antiphospholipid antibody syndrome. She is on twice daily Arixtra and states compliance with this. She's had pneumococcal vaccine. On exam she is tachycardic afebrile uncomfortable borderline tachycardic but normal oxygen saturation. Lungs are clear. Skin is warm and dry. Abdomen is soft nontender. There is no edema cords Homans or calf tenderness. There normal pulses. Normal compartments. Normal motor strength. Impression is tachycardic, pleuritic chest pain, brian sputum, rule out pneumonia, rule out PE less likely. Plan is chest x-ray, laboratory studies, IV fluids, analgesics, d-dimer. We'll CTA if d-dimer positive if adequate venous access, if not, consider V/Q. Mckenzie Ramirez. Casey Steele MD, [address] [facility]  Attending Emergency  Physician                Ellen Mora MD  12/14/17 0012    EKG Interpretation    Interpreted by me    Rhythm: normal sinus   Rate: normal  Axis: normal  Ectopy: none  Conduction: normal  ST Segments: minimal depression inferolaterally  T Waves: inversion inferolaterally  Q Waves: none    Clinical Impression: nonspecific, improved from earlier ECG    Tachycardia slightly improved with IV fluids and analgesics. She is requesting more analgesics as well as some for home. Patient has persistent tachycardia, will continue IV fluids and reassess, especially upright. Patient has refused sq insulin for hyperglycemia. FYI note raises concern for opioid seeking behavior. She is reluctant to get another CT angiogram for PE since she's had so many CAT scans.  She understands that she could have a small pulmonary embolism with a negative d-dimer that could be causing symptoms and EKG changes. Infectious cause is still as likely, if discharge we'll start antibiotics. Patient has HEART score of 4 and abnormal EKG but normal troponin. Repeat EKG is slightly improved. Her chest pain is atypical and she's had stress test this spring which was normal.  If patient requests discharge this would be with informed refusal and recommended early follow-up and return if worse.   Plan at this point is:  2 and 4 hour troponin  IVF and reassess HR and orthostatics  Informed discharge on antibiotics with early follow-up if negative trop and improved HR             Gurpreet Mcrae MD  12/14/17 9783

## 2017-12-14 NOTE — ED PROVIDER NOTES
Vw)    Result Date: 12/14/2017  EXAMINATION: TWO VIEWS OF THE CHEST 12/14/2017 12:35 am COMPARISON: 09/11/2017 HISTORY: ORDERING SYSTEM PROVIDED HISTORY: cough TECHNOLOGIST PROVIDED HISTORY: Reason for exam:->cough Right side pain. FINDINGS: Heart size and configuration are normal.  The lungs are clear. No pneumothorax or pleural effusion. No acute bone finding. No acute cardiopulmonary disease. RECENT VITALS:     Temp: 98 °F (36.7 °C),  Pulse: 99, Resp: 16, BP: 109/86, SpO2: 96 %    This patient is a 44 y.o. Female with Right-sided chest pain, shortness of breath and brian-colored sputum. Patient has a history DVT/PE and is taking Arixtra. Patient elected not to have a CT scan done as she has had multiple in the past.  D-dimer normal.  Initial troponin negative. Chest x-ray unremarkable. Patient does have significant history of antiphospholipid syndrome. Patient agreeable to 4 hour troponin. Patient did have some changes with T waves in the precordial leads on her EKG. Heart score 4. Will need to discuss possible admission but patient does refuse, will recommend early follow-up. Has follow-up appointment with cardiology on Monday. 2:28 AM  Notified by RN that patient is requesting more pain medication. Discussed with patient that no further IV pain medications will be given but that we can give a dose of oral medication. 2:59 AM  Patient's second troponin negative, EKG unchanged from prior, blood glucose did come down with a small dose of insulin, lactic acid normal and unchanged on second lab tests, tachycardia has resolved with IV fluids. Offered patient admission for cardiology evaluation in the morning the patient refused. We'll discharge patient at this time with short course of tramadol and Levaquin. Instructed to return to the emergency room for worsening chest pain, shortness of breath, syncope, or any other concerning symptoms.   Also instructed to follow-up with her primary

## 2017-12-14 NOTE — ED PROVIDER NOTES
[facility]  ED  Emergency Department Encounter  Emergency Medicine Resident     Pt Name: Rabia Leong  MRN: [id number]  [place] 1980  Date of evaluation: 12/13/17  PCP:  Benita Hayes [id number]       Chief Complaint   Patient presents with    Leg Pain     right side x6 days, hx of DVT in rt leg       HISTORY OF PRESENT ILLNESS  (Location/Symptom, Timing/Onset, Context/Setting, Quality, Duration, Modifying Factors, Severity.)      Rabia Leong is a 44 y.o. female who presents with Leg pain. Patient states 4 days ago she started to have a calf pain and now has it in her left calf as well. Patient is also experiencing right axillary and right upper arm pain for the past day. She has also had cough productive of \"brownish red tinged sputum\". Patient has past history of antiphospholipid syndrome and she has had multiple DVTs and PE and states that these feel the same. Patient is on fondaparinux injections and has been compliant with these. Patient does follow with hematology. Patient states she also has some difficulty taking a deep breath and some pain on the right side of her chest near her ribs and arm pit. Patient states she's had mild headache that is not the worst of her life as well as some lightheadedness when she stands up. Patient denies any recent fall or trauma denies any recent injury. Patient has extensive past medical history including diabetes, hypertension, blood transfusions, and multiple surgeries including bariatric surgery, hysterectomy, and finger amputation. Patient denies any smoking, illicit drug use or alcohol. She denies any sore throat, ear pain or rashes. PAST MEDICAL / SURGICAL / SOCIAL / FAMILY HISTORY      has a past medical history of Alcohol abuse; Anxiety and depression; Arthritis; Painting esophagus; Bipolar disorder, unspecified; Broken rib; Depression; Diabetes mellitus ([address] Diabetes mellitus ([address]  Embolism and [citalopram hydrobromide]; Lasix [furosemide]; Macrobid [nitrofurantoin monohyd macro]; Macrobid [nitrofurantoin]; Other; Betadine [povidone iodine]; Lithium; Paxil [paroxetine hcl]; and Tegretol [carbamazepine]    Home Medications:  Prior to Admission medications    Medication Sig Start Date End Date Taking?  Authorizing Provider   dicyclomine (BENTYL) 10 MG capsule Take 1 capsule by mouth 4 times daily (before meals and nightly) 10/22/17   Soraida Gutierrez PA-C   acetaminophen (APAP EXTRA STRENGTH) 500 MG tablet Take 1 tablet by mouth every 4 hours as needed for Pain (Take when out of Norco or when the pain is less severe, do not take at the same time as both contain acetaminophen) 9/22/17   Desiree Anderson, DO   traMADol (ULTRAM) 50 MG tablet Take 1 tablet by mouth every 6 hours as needed for Pain 9/18/17   Nohemi Gordon PA-C   ondansetron (ZOFRAN) 4 MG tablet Take 1 tablet by mouth every 8 hours as needed for Nausea 9/11/17   Nova Chavira, DO   ondansetron (ZOFRAN ODT) 4 MG disintegrating tablet Take 1 tablet by mouth every 8 hours as needed for Nausea 8/22/17   Agusto Palacios MD   tiZANidine (ZANAFLEX) 4 MG tablet Take 1 tablet by mouth every 6 hours as needed (pain/spasm) 7/2/17   Shannan Romo, DO   ondansetron (ZOFRAN ODT) 4 MG disintegrating tablet Take 1 tablet by mouth every 8 hours as needed for Nausea 6/2/17   Janine Barahona MD   zolpidem (AMBIEN) 5 MG tablet Take 5 mg by mouth nightly as needed for Sleep    Historical Provider, MD   LORazepam (ATIVAN) 1 MG tablet Take 1 mg by mouth 2 times daily as needed for Anxiety    Historical Provider, MD   LORazepam (ATIVAN) 2 MG tablet Take 2 mg by mouth nightly as needed for Anxiety    Historical Provider, MD   esomeprazole Magnesium (NEXIUM) 40 MG PACK Take 40 mg by mouth daily    Historical Provider, MD   diltiazem (CARDIZEM CD) 240 MG extended release capsule Take 1 capsule by mouth daily 4/10/17   Yasmine Saxena MD   DULoxetine (Laroy Falkland) 60 Bradley Khan MD       REVIEW OF SYSTEMS    (2-9 systems for level 4, 10 or more for level 5)      Review of Systems   Constitutional: Positive for activity change and fatigue. Negative for appetite change, chills, fever and unexpected weight change. HENT: Negative for congestion and sore throat. Eyes: Negative for visual disturbance. Respiratory: Positive for cough, chest tightness and shortness of breath. Cardiovascular: Positive for chest pain. Negative for palpitations. Gastrointestinal: Positive for vomiting. Negative for abdominal pain, blood in stool and diarrhea. Genitourinary: Negative for dysuria, hematuria, vaginal bleeding and vaginal discharge. Musculoskeletal: Negative for gait problem and neck pain. Skin: Negative for color change and rash. Neurological: Positive for light-headedness. Negative for dizziness, syncope and speech difficulty. Psychiatric/Behavioral: Negative for confusion. PHYSICAL EXAM   (up to 7 for level 4, 8 or more for level 5)      INITIAL VITALS:   /79   Pulse 110   Temp 98 °F (36.7 °C)   Resp 16   Ht 5' 8\" (1.727 m)   Wt 280 lb (127 kg)   SpO2 96%   BMI 42.57 kg/m²     Physical Exam   Constitutional: She is oriented to person, place, and time. She appears well-developed and well-nourished. No distress. HENT:   Head: Normocephalic and atraumatic. Mouth/Throat: Oropharynx is clear and moist.   Eyes: EOM are normal. Pupils are equal, round, and reactive to light. Neck: Normal range of motion. Neck supple. No hepatojugular reflux present. No neck rigidity. No Brudzinski's sign and no Kernig's sign noted. Cardiovascular: Regular rhythm, normal heart sounds and intact distal pulses. Tachycardia present. Exam reveals no gallop and no friction rub. No murmur heard. Pulses:       Radial pulses are 2+ on the right side, and 2+ on the left side. Dorsalis pedis pulses are 2+ on the right side, and 2+ on the left side. 45.4 36.3 - 47.1 %    MCV 91.2 82.6 - 102.9 fL    MCH 29.9 25.2 - 33.5 pg    MCHC 32.8 28.4 - 34.8 g/dL    RDW 12.1 11.8 - 14.4 %    Platelets 907 740 - 901 k/uL    MPV 12.4 8.1 - 13.5 fL    Differential Type NOT REPORTED     Seg Neutrophils 60 36 - 65 %    Lymphocytes 30 24 - 43 %    Monocytes 8 3 - 12 %    Eosinophils % 1 1 - 4 %    Basophils 1 0 - 2 %    Immature Granulocytes 0 0 %    Segs Absolute 3.59 1.50 - 8.10 k/uL    Absolute Lymph # 1.83 1.10 - 3.70 k/uL    Absolute Mono # 0.50 0.10 - 1.20 k/uL    Absolute Eos # 0.08 0.00 - 0.44 k/uL    Basophils # 0.03 0.00 - 0.20 k/uL    Absolute Immature Granulocyte <0.03 0.00 - 0.30 k/uL    WBC Morphology NOT REPORTED     RBC Morphology NOT REPORTED     Platelet Estimate NOT REPORTED    Lactic Acid, Whole Blood   Result Value Ref Range    Lactic Acid, Whole Blood 1.9 0.7 - 2.1 mmol/L   APTT   Result Value Ref Range    PTT 25.3 21.3 - 31.3 sec   Protime-INR   Result Value Ref Range    Protime 9.6 9.4 - 12.6 sec    INR 0.9    Basic Metabolic Panel   Result Value Ref Range    Glucose 433 (HH) 70 - 99 mg/dL    BUN 12 6 - 20 mg/dL    CREATININE 0.60 0.50 - 0.90 mg/dL    Bun/Cre Ratio NOT REPORTED 9 - 20    Calcium 8.8 8.6 - 10.4 mg/dL    Sodium 138 135 - 144 mmol/L    Potassium 4.5 3.7 - 5.3 mmol/L    Chloride 102 98 - 107 mmol/L    CO2 24 20 - 31 mmol/L    Anion Gap 12 9 - 17 mmol/L    GFR Non-African American >60 >60 mL/min    GFR African American >60 >60 mL/min    GFR Comment          GFR Staging NOT REPORTED    HEPATIC FUNCTION PANEL   Result Value Ref Range    Alb 3.5 3.5 - 5.2 g/dL    Alkaline Phosphatase 120 (H) 35 - 104 U/L    ALT 12 5 - 33 U/L    AST 10 <32 U/L    Total Bilirubin 0.17 (L) 0.3 - 1.2 mg/dL    Bilirubin, Direct <0.08 <0.31 mg/dL    Bilirubin, Indirect CANNOT BE CALCULATED 0.00 - 1.00 mg/dL    Total Protein 6.9 6.4 - 8.3 g/dL    Globulin NOT REPORTED 1.5 - 3.8 g/dL    Albumin/Globulin Ratio 1.0 1.0 - 2.5   D-Dimer, Quantitative   Result Value Ref Range D-Dimer, Quant 0.20 mg/L FEU   POCT troponin   Result Value Ref Range    POC Troponin I 0.00 0.00 - 0.10 ng/mL    POC Troponin Interp       The Troponin-I (POC) results cannot be compared to the Troponin-T results. RADIOLOGY:  XR CHEST STANDARD (2 VW)   Final Result   No acute cardiopulmonary disease. EKG  EKG Interpretation    Interpreted by emergency department physician    Rhythm: normal sinus   Rate: tachycardia  Axis: normal  Ectopy: none  Conduction: normal  ST Segments: depression in  v3, v4, v5, v6 and aVf  T Waves: inversion in  v5, v6, II, III and aVf  Q Waves: none    Clinical Impression: non-specific EKG, possible ischemia, minimal changes from prior EKG    Betty Gonzalez    All EKG's are interpreted by the Emergency Department Physician who either signs or Co-signs this chart in the absence of a cardiologist.    MDM/EMERGENCY DEPARTMENT COURSE:  Patient is a 58-year-old female with extensive past medical history including DVT and PE who presents with bilateral leg pain and right axilla and right upper arm pain that feels similar to her prior blood clots. Patient has antiphospholipid syndrome and is on fondaparinux. Patient also states she has some shortness of breath and some chest pain on the right side when she takes deep breaths. Chest states she is been coughing up blood-tinged sputum. On exam patient is tachycardic, afebrile and nontoxic-appearing. Patient is obese. Oropharynx is clear, lungs are clear to auscultation bilaterally, heart sounds normal, abdomen soft nontender, right upper arm with no tenderness to palpation, radial pulses intact bilaterally, bilateral calves with tenderness with right greater than left but no obvious swelling, no palpable cords, no Homans sign. Plan to treat patient's pain with morphine and give her fluids due to tachycardia.   Given her extensive past medical history of blood clots unable to rule out PE with PERC criteria, we'll get CBC, BMP, EKG, chest x-ray, troponin ×2 to rule out any ACS or pneumonia, will also get liver function tests, urinalysis and urine culture due to tachycardia, coagulation factors given blood thinning medication and will get d-dimer to help guide further management for PE workup. If patient's kidney function is within normal, we'll proceed with CT chest rule out PE.    12:31 AM  WBC normal. D-dimer 0.20. Lactic 1.9. Patient with Heart score of 4. Will await CXR. Patient with CURB-65 score of 0. Heart Score:   Heart Score for chest pain patients  History: Slightly Suspicious  ECG: Significant ST-deviation  Patient Age: < 45 years  *Risk factors for Atherosclerotic disease: Diabetes Mellitus, Hypertension, Obesity  Risk Factors: > 3 Risk factors or history of atherosclerotic disease*  Troponin: < 1X normal limit  Heart Score Total: 4    Score 0-3: 2.5% MACE over next 6 weeks = Discharge Home  Score 4-6: 20.3% MACE over next 6 weeks = Admit for Clinical Observation  Score 7-10: 72.7% MACE over next 6 weeks = Early Invasive Strategies   Chest Pain in the Emergency Room: A Multicenter Validation of the [address] [place] BE, Shailesh AJ, Scott BIJAN, Mast TP, [employer], Mast EG, Debra SH, [employer] [facility]. Crit Pathw Cardiol. 2010 Sep; 9(3): 164-169. \"A prospective validation of the HEART score for chest pain patients at the emergency department. \" Int J Cardiol. 2013 Oct 3;168(3):2153-8. doi: 10.1016/j.ijcard. 2013.01.255. Epub 2013 Mar 7.    1:23 AM  Repeat EKG shows improvement of ST depressions in the inferolateral leads. Tachycardia is slightly improved with IV fluids and morphine. She is requesting more morphine and saying that she wants it at home as well. Patient does have advised for opiate seeking behavior. Patient refusing insulin for her hyperglycemia of 433. Patient is currently refusing CT to rule out PE given that she has had some any CT scans.   I did tell her that there is chance she could still have a pulmonary embolism even with a negative d-dimer. Patient does have heart score 4 with a normal troponin but abnormal EKG. Patient had stress test in the spring which is normal.  Plan is for 2 and 4 hour troponins as well as another liter of IV fluids and to reassess heart rate and orthostatics. If patient is requesting discharge will send home with Levaquin or Zithromax and have her follow-up with her cardiologist.  Patient could also be admitted to ETU and see cardiology in the morning if her pain is not controlled or her troponin increases. I spoke with Dr. Joni Rivera who will be taking over the care of this patient. Patient has appointment with Dr. Grant Padilla with cardiology on Monday if she is discharged. PROCEDURES:  None    CONSULTS:  None    CRITICAL CARE:  None    FINAL IMPRESSION      1. Bilateral leg pain    2. Chest pain, atypical          DISPOSITION / PLAN     DISPOSITION Pending    PATIENT REFERRED TO:  No follow-up provider specified. DISCHARGE MEDICATIONS:  New Prescriptions    No medications on file       Kevin Henning MD  Emergency Medicine Resident    (Please note that portions of this note were completed with a voice recognition program.  Efforts were made to edit the dictations but occasionally words are mis-transcribed. )        Kevin Henning MD  Resident  12/14/17 [id number]

## 2017-12-14 NOTE — FLOWSHEET NOTE
Patient A&Ox4, Skin W/D/I, RR even and unlabored, vital stable, not exhibiting any signs of distress. Discharge instruction given to patient with scripts x3, verbalized understanding. Patient discharged home with all of belongings, ambulatory with steady gait, denied any other needs at this time.

## 2017-12-19 LAB
CULTURE: NORMAL
CULTURE: NORMAL
Lab: NORMAL
SPECIMEN DESCRIPTION: NORMAL
STATUS: NORMAL

## 2017-12-24 ENCOUNTER — HOSPITAL ENCOUNTER (EMERGENCY)
Age: 37
Discharge: HOME | End: 2017-12-24
Attending: EMERGENCY MEDICINE
Payer: MEDICARE

## 2017-12-24 VITALS
OXYGEN SATURATION: 99 % | TEMPERATURE: 98.3 F | SYSTOLIC BLOOD PRESSURE: 137 MMHG | HEART RATE: 87 BPM | RESPIRATION RATE: 18 BRPM | DIASTOLIC BLOOD PRESSURE: 74 MMHG

## 2017-12-24 DIAGNOSIS — K04.7 DENTAL INFECTION: Primary | ICD-10-CM

## 2017-12-24 PROCEDURE — 99283 EMERGENCY DEPT VISIT LOW MDM: CPT

## 2017-12-24 PROCEDURE — 6370000000 HC RX 637 (ALT 250 FOR IP): Performed by: EMERGENCY MEDICINE

## 2017-12-24 RX ORDER — PENICILLIN V POTASSIUM 250 MG/1
500 TABLET ORAL ONCE
Status: COMPLETED | OUTPATIENT
Start: 2017-12-24 | End: 2017-12-24

## 2017-12-24 RX ORDER — ESZOPICLONE 3 MG/1
3 TABLET, FILM COATED ORAL NIGHTLY
Status: ON HOLD | COMMUNITY
End: 2018-11-17

## 2017-12-24 RX ORDER — OXYCODONE HYDROCHLORIDE AND ACETAMINOPHEN 5; 325 MG/1; MG/1
2 TABLET ORAL ONCE
Status: COMPLETED | OUTPATIENT
Start: 2017-12-24 | End: 2017-12-24

## 2017-12-24 RX ORDER — PENICILLIN V POTASSIUM 500 MG/1
500 TABLET ORAL 4 TIMES DAILY
Qty: 28 TABLET | Refills: 0 | Status: SHIPPED | OUTPATIENT
Start: 2017-12-24 | End: 2017-12-31

## 2017-12-24 RX ADMIN — OXYCODONE HYDROCHLORIDE AND ACETAMINOPHEN 2 TABLET: 5; 325 TABLET ORAL at 02:48

## 2017-12-24 RX ADMIN — PENICILLIN V POTASSIUM 500 MG: 250 TABLET, FILM COATED ORAL at 02:48

## 2017-12-24 ASSESSMENT — PAIN DESCRIPTION - ORIENTATION: ORIENTATION: RIGHT;LOWER

## 2017-12-24 ASSESSMENT — ENCOUNTER SYMPTOMS
COLOR CHANGE: 0
DIARRHEA: 0
EYE REDNESS: 0
NAUSEA: 0
VOMITING: 0
EYE DISCHARGE: 0
RHINORRHEA: 0
COUGH: 0
SHORTNESS OF BREATH: 0
SORE THROAT: 0

## 2017-12-24 ASSESSMENT — PAIN SCALES - GENERAL
PAINLEVEL_OUTOF10: 2
PAINLEVEL_OUTOF10: 8

## 2017-12-24 ASSESSMENT — PAIN DESCRIPTION - PAIN TYPE: TYPE: ACUTE PAIN

## 2017-12-24 ASSESSMENT — PAIN DESCRIPTION - DESCRIPTORS: DESCRIPTORS: BURNING;PRESSURE

## 2017-12-24 NOTE — ED PROVIDER NOTES
[facility]  eMERGENCY dEPARTMENT eNCOUnter      Pt Name: Tahira Xavier  MRN: [id number]  [place] 1980  Date of evaluation: 12/24/2017    CHIEF COMPLAINT       Chief Complaint   Patient presents with    Oral Swelling     right lower side         HISTORY OF PRESENT ILLNESS    Tahira Xavier is a 44 y.o. female who presents With complaints of increasing pain and swelling in the right lower jaw. Patient states that over the past few days she's had increasing pain and swelling. The symptoms are severe, aggravated by chewing and without any relieving factors. Patient has a history of similar infection. REVIEW OF SYSTEMS         Review of Systems   Constitutional: Negative for chills and fever. HENT: Positive for dental problem. Negative for rhinorrhea and sore throat. Eyes: Negative for discharge, redness and visual disturbance. Respiratory: Negative for cough and shortness of breath. Cardiovascular: Negative for chest pain, palpitations and leg swelling. Gastrointestinal: Negative for diarrhea, nausea and vomiting. Musculoskeletal: Negative for arthralgias, myalgias and neck pain. Skin: Negative for color change and rash. Neurological: Negative for seizures, weakness and headaches. Psychiatric/Behavioral: Negative for hallucinations, self-injury and suicidal ideas. PAST MEDICAL HISTORY    has a past medical history of Alcohol abuse; Anxiety and depression; Arthritis; Painting esophagus; Bipolar disorder, unspecified; Broken rib; Depression; Diabetes mellitus ([address] Diabetes mellitus ([address] Embolism and thrombosis of unspecified site; Genital herpes, unspecified; GERD (gastroesophageal reflux disease); History of blood transfusion; History of hypertension; Hx of blood clots; Hypertension; Hypoglycemia, unspecified; Lumbosacral spondylosis without myelopathy; MDRO (multiple drug resistant organisms) resistance;  Miscarriage; MRSA (methicillin resistant staph aureus) culture positive; Overdose of drug; Pernicious anemia; Primary hypercoagulable state [address] Suicide attempt; SVT (supraventricular tachycardia) ([facility] [address] and Type II or unspecified type diabetes mellitus without mention of complication, not stated as uncontrolled. SURGICAL HISTORY      has a past surgical history that includes Bariatric Surgery (2013); Cholecystectomy; Hysterectomy; Liver Resection; hernia repair; Tonsillectomy; Endoscopy, colon, diagnostic; Abdominal hernia repair (2014); Abdominal hernia repair (11/3/14); Bariatric Surgery (2013); Dilation and curettage of uterus (2005); and Finger amputation (Left, 02/09/2015). CURRENT MEDICATIONS       Previous Medications    ACETAMINOPHEN (APAP EXTRA STRENGTH) 500 MG TABLET    Take 1 tablet by mouth every 4 hours as needed for Pain (Take when out of Norco or when the pain is less severe, do not take at the same time as both contain acetaminophen)    BUMETANIDE (BUMEX) 2 MG TABLET    Take 1 tablet by mouth daily    DICYCLOMINE (BENTYL) 10 MG CAPSULE    Take 1 capsule by mouth 4 times daily (before meals and nightly)    DILTIAZEM (CARDIZEM CD) 240 MG EXTENDED RELEASE CAPSULE    Take 1 capsule by mouth daily    DULOXETINE (CYMBALTA) 60 MG EXTENDED RELEASE CAPSULE    Take 1 capsule by mouth daily    DULOXETINE HCL 40 MG CPEP    Take 40 mg by mouth daily Do not crush or break. May add contents of capsule to apple juice or apple sauce, but not chocolate. ESOMEPRAZOLE MAGNESIUM (NEXIUM) 40 MG PACK    Take 40 mg by mouth daily    ESZOPICLONE (ESZOPICLONE) 3 MG TABS    Take 3 mg by mouth nightly .     FONDAPARINUX (ARIXTRA) 10 MG/0.8ML SOLN INJECTION    INJECT 0.8MLS INTO THE SKIN DAILY    GLUCOSE MONITORING KIT (FREESTYLE) MONITORING KIT    1 kit by Does not apply route daily as needed    INSULIN ASPART (NOVOLOG FLEXPEN) 100 UNIT/ML INJECTION PEN    Inject 5 Units into the skin 3 times daily (before meals) Hold if sugar below 100 INSULIN GLARGINE (LANTUS) 100 UNIT/ML INJECTION VIAL    Inject 30 Units into the skin nightly    INSULIN PEN NEEDLE 31G X 6 MM MISC    1 each by Does not apply route 2 times daily    LANCETS MISC    1 each by Other route 3 times daily    LIDOCAINE (XYLOCAINE) 5 % OINTMENT    Apply topically as needed. LISINOPRIL (PRINIVIL;ZESTRIL) 5 MG TABLET    Take 1 tablet by mouth Daily with lunch    LORAZEPAM (ATIVAN) 1 MG TABLET    Take 1 mg by mouth 2 times daily as needed for Anxiety    LORAZEPAM (ATIVAN) 2 MG TABLET    Take 2 mg by mouth nightly as needed for Anxiety    MAGNESIUM OXIDE (MAG-OX) 400 (241.3 MG) MG TABS TABLET    Take 1 tablet by mouth 2 times daily    MENTHOL, TOPICAL ANALGESIC, 5 % PADS    Apply to the chest wall, as needed for pain, daily. ONDANSETRON (ZOFRAN ODT) 4 MG DISINTEGRATING TABLET    Take 1 tablet by mouth every 8 hours as needed for Nausea    ONDANSETRON (ZOFRAN ODT) 4 MG DISINTEGRATING TABLET    Take 1 tablet by mouth every 8 hours as needed for Nausea    ONDANSETRON (ZOFRAN) 4 MG TABLET    Take 1 tablet by mouth every 8 hours as needed for Nausea    SILVER SULFADIAZINE (SILVADENE) 1 % CREAM    Apply topically daily. TIZANIDINE (ZANAFLEX) 4 MG TABLET    Take 1 tablet by mouth every 6 hours as needed (pain/spasm)    TRAMADOL (ULTRAM) 50 MG TABLET    Take 1 tablet by mouth every 6 hours as needed for Pain . VITAMIN D (CHOLECALCIFEROL) 1000 UNIT TABS TABLET    Take 10,000 Units by mouth daily None for about 1 week, states not given while in hospital    ZOLPIDEM (AMBIEN) 5 MG TABLET    Take 5 mg by mouth nightly as needed for Sleep       ALLERGIES     is allergic to betadine [povidone iodine]; celexa [citalopram hydrobromide]; lasix [furosemide]; macrobid [nitrofurantoin monohyd macro]; macrobid [nitrofurantoin]; other; betadine [povidone iodine]; codeine; lithium; paxil [paroxetine hcl]; and tegretol [carbamazepine]. FAMILY HISTORY     indicated that her mother is alive.  She indicated that her father is alive. family history includes Depression in her mother; Diabetes in her father; Heart Disease in her father; High Blood Pressure in her father and mother; High Cholesterol in her father and mother; Kidney Disease in her father. SOCIAL HISTORY      reports that she has never smoked. She has never used smokeless tobacco. She reports that she does not drink alcohol or use drugs. PHYSICAL EXAM     INITIAL VITALS:  temperature is 98.3 °F (36.8 °C). Her blood pressure is 137/74 and her pulse is 87. Her respiration is 18 and oxygen saturation is 99%. Physical Exam   Constitutional: She is oriented to person, place, and time. She appears well-developed and well-nourished. Non-toxic appearance. She does not appear ill. HENT:   Head: Normocephalic and atraumatic. Mouth/Throat: Abnormal dentition. Dental abscesses and dental caries present. Eyes: Conjunctivae and EOM are normal. Pupils are equal, round, and reactive to light. Neck: Trachea normal and normal range of motion. Neck supple. Cardiovascular: Normal rate, regular rhythm, S1 normal and S2 normal.    No murmur heard. Pulmonary/Chest: Effort normal and breath sounds normal. No accessory muscle usage. No respiratory distress. She exhibits no tenderness and no deformity. Abdominal: Normal appearance and bowel sounds are normal. She exhibits no distension, no abdominal bruit and no ascites. There is no tenderness. There is no rigidity, no rebound and no guarding. Neurological: She is alert and oriented to person, place, and time. GCS eye subscore is 4. GCS verbal subscore is 5. GCS motor subscore is 6. Skin: Skin is warm. No rash noted. DIFFERENTIAL DIAGNOSIS/MDM:   This is a 50-year-old female that presents with complaints of right-sided dental swelling, the patient has multiple areas of caries, she has a evidence of swelling with no trismus.   Plan is and about X and outpatient follow-up. DIAGNOSTIC RESULTS     EKG: All EKG's are interpreted by the Emergency Department Physician who either signs or Co-signs this chart in the absence of a cardiologist.    Not indicated    RADIOLOGY:   I directly visualized the following  images and reviewed the radiologist interpretations:  No orders to display           LABS:  Labs Reviewed - No data to display          EMERGENCY DEPARTMENT COURSE:   Vitals:    Vitals:    12/24/17 0056 12/24/17 0057   BP: 137/74    Pulse: 87    Resp: 18    Temp:  98.3 °F (36.8 °C)   SpO2: 99%          CRITICAL CARE:      CONSULTS:  None      PROCEDURES:      FINAL IMPRESSION      1.  Dental infection          DISPOSITION/PLAN   DISPOSITION Decision To Discharge 12/24/2017 02:27:26 AM          PATIENT REFERRED TO:  Tsering Kovacs MD  [address] [place] [phone]    Schedule an appointment as soon as possible for a visit in 2 days        DISCHARGE MEDICATIONS:  New Prescriptions    PENICILLIN V POTASSIUM (VEETID) 500 MG TABLET    Take 1 tablet by mouth 4 times daily for 7 days       (Please note that portions of this note were completed with a voice recognition program.  Efforts were made to edit the dictations but occasionally words are mis-transcribed.)    Brigette Charles  Emergency Medicine                 Brigette Charles MD  12/24/17 [id number]

## 2017-12-29 ENCOUNTER — APPOINTMENT (OUTPATIENT)
Dept: GENERAL RADIOLOGY | Age: 37
End: 2017-12-29
Payer: MEDICARE

## 2017-12-29 ENCOUNTER — HOSPITAL ENCOUNTER (EMERGENCY)
Age: 37
Discharge: HOME | End: 2017-12-29
Attending: EMERGENCY MEDICINE
Payer: MEDICARE

## 2017-12-29 VITALS
WEIGHT: 281 LBS | OXYGEN SATURATION: 100 % | BODY MASS INDEX: 42.73 KG/M2 | RESPIRATION RATE: 16 BRPM | DIASTOLIC BLOOD PRESSURE: 92 MMHG | SYSTOLIC BLOOD PRESSURE: 158 MMHG | TEMPERATURE: 97.2 F | HEART RATE: 101 BPM

## 2017-12-29 DIAGNOSIS — R19.5 OCCULT BLOOD IN STOOLS: ICD-10-CM

## 2017-12-29 DIAGNOSIS — R10.13 ABDOMINAL PAIN, EPIGASTRIC: Primary | ICD-10-CM

## 2017-12-29 DIAGNOSIS — R11.2 NON-INTRACTABLE VOMITING WITH NAUSEA, UNSPECIFIED VOMITING TYPE: ICD-10-CM

## 2017-12-29 LAB
ABSOLUTE EOS #: 0.08 K/UL (ref 0–0.44)
ABSOLUTE IMMATURE GRANULOCYTE: 0.03 K/UL (ref 0–0.3)
ABSOLUTE LYMPH #: 2.15 K/UL (ref 1.1–3.7)
ABSOLUTE MONO #: 0.43 K/UL (ref 0.1–1.2)
ALBUMIN SERPL-MCNC: 3.5 G/DL (ref 3.5–5.2)
ALBUMIN/GLOBULIN RATIO: 1.1 (ref 1–2.5)
ALP BLD-CCNC: 111 U/L (ref 35–104)
ALT SERPL-CCNC: 15 U/L (ref 5–33)
ANION GAP SERPL CALCULATED.3IONS-SCNC: 14 MMOL/L (ref 9–17)
AST SERPL-CCNC: 10 U/L
BASOPHILS # BLD: 1 % (ref 0–2)
BASOPHILS ABSOLUTE: 0.05 K/UL (ref 0–0.2)
BILIRUB SERPL-MCNC: 0.22 MG/DL (ref 0.3–1.2)
BUN BLDV-MCNC: 10 MG/DL (ref 6–20)
BUN/CREAT BLD: ABNORMAL (ref 9–20)
CALCIUM SERPL-MCNC: 9.1 MG/DL (ref 8.6–10.4)
CHLORIDE BLD-SCNC: 100 MMOL/L (ref 98–107)
CO2: 23 MMOL/L (ref 20–31)
CREAT SERPL-MCNC: 0.5 MG/DL (ref 0.5–0.9)
DIFFERENTIAL TYPE: NORMAL
EOSINOPHILS RELATIVE PERCENT: 1 % (ref 1–4)
GFR AFRICAN AMERICAN: >60 ML/MIN
GFR NON-AFRICAN AMERICAN: >60 ML/MIN
GFR SERPL CREATININE-BSD FRML MDRD: ABNORMAL ML/MIN/{1.73_M2}
GFR SERPL CREATININE-BSD FRML MDRD: ABNORMAL ML/MIN/{1.73_M2}
GLUCOSE BLD-MCNC: 281 MG/DL (ref 70–99)
HCT VFR BLD CALC: 39 % (ref 36.3–47.1)
HEMOGLOBIN: 13.1 G/DL (ref 11.9–15.1)
IMMATURE GRANULOCYTES: 0 %
LIPASE: 21 U/L (ref 13–60)
LYMPHOCYTES # BLD: 31 % (ref 24–43)
MCH RBC QN AUTO: 29.6 PG (ref 25.2–33.5)
MCHC RBC AUTO-ENTMCNC: 33.6 G/DL (ref 28.4–34.8)
MCV RBC AUTO: 88.2 FL (ref 82.6–102.9)
MONOCYTES # BLD: 6 % (ref 3–12)
PDW BLD-RTO: 12.1 % (ref 11.8–14.4)
PLATELET # BLD: 271 K/UL (ref 138–453)
PLATELET ESTIMATE: NORMAL
PMV BLD AUTO: 11.9 FL (ref 8.1–13.5)
POTASSIUM SERPL-SCNC: 3.8 MMOL/L (ref 3.7–5.3)
RBC # BLD: 4.42 M/UL (ref 3.95–5.11)
RBC # BLD: NORMAL 10*6/UL
SEG NEUTROPHILS: 61 % (ref 36–65)
SEGMENTED NEUTROPHILS ABSOLUTE COUNT: 4.22 K/UL (ref 1.5–8.1)
SODIUM BLD-SCNC: 137 MMOL/L (ref 135–144)
TOTAL PROTEIN: 6.8 G/DL (ref 6.4–8.3)
WBC # BLD: 7 K/UL (ref 3.5–11.3)
WBC # BLD: NORMAL 10*3/UL

## 2017-12-29 PROCEDURE — 96374 THER/PROPH/DIAG INJ IV PUSH: CPT

## 2017-12-29 PROCEDURE — 99284 EMERGENCY DEPT VISIT MOD MDM: CPT

## 2017-12-29 PROCEDURE — 2580000003 HC RX 258: Performed by: EMERGENCY MEDICINE

## 2017-12-29 PROCEDURE — 74022 RADEX COMPL AQT ABD SERIES: CPT

## 2017-12-29 PROCEDURE — 83690 ASSAY OF LIPASE: CPT

## 2017-12-29 PROCEDURE — 85025 COMPLETE CBC W/AUTO DIFF WBC: CPT

## 2017-12-29 PROCEDURE — 6360000002 HC RX W HCPCS: Performed by: EMERGENCY MEDICINE

## 2017-12-29 PROCEDURE — 96376 TX/PRO/DX INJ SAME DRUG ADON: CPT

## 2017-12-29 PROCEDURE — 96361 HYDRATE IV INFUSION ADD-ON: CPT

## 2017-12-29 PROCEDURE — 96375 TX/PRO/DX INJ NEW DRUG ADDON: CPT

## 2017-12-29 PROCEDURE — 80053 COMPREHEN METABOLIC PANEL: CPT

## 2017-12-29 RX ORDER — MORPHINE SULFATE 4 MG/ML
4 INJECTION, SOLUTION INTRAMUSCULAR; INTRAVENOUS ONCE
Status: COMPLETED | OUTPATIENT
Start: 2017-12-29 | End: 2017-12-29

## 2017-12-29 RX ORDER — PROMETHAZINE HYDROCHLORIDE 25 MG/1
25 TABLET ORAL EVERY 6 HOURS PRN
Qty: 10 TABLET | Refills: 0 | Status: SHIPPED | OUTPATIENT
Start: 2017-12-29 | End: 2018-01-05

## 2017-12-29 RX ORDER — ONDANSETRON 2 MG/ML
4 INJECTION INTRAMUSCULAR; INTRAVENOUS ONCE
Status: COMPLETED | OUTPATIENT
Start: 2017-12-29 | End: 2017-12-29

## 2017-12-29 RX ORDER — PROMETHAZINE HYDROCHLORIDE 25 MG/ML
12.5 INJECTION, SOLUTION INTRAMUSCULAR; INTRAVENOUS ONCE
Status: COMPLETED | OUTPATIENT
Start: 2017-12-29 | End: 2017-12-29

## 2017-12-29 RX ORDER — MORPHINE SULFATE 4 MG/ML
2 INJECTION, SOLUTION INTRAMUSCULAR; INTRAVENOUS ONCE
Status: COMPLETED | OUTPATIENT
Start: 2017-12-29 | End: 2017-12-29

## 2017-12-29 RX ORDER — 0.9 % SODIUM CHLORIDE 0.9 %
1000 INTRAVENOUS SOLUTION INTRAVENOUS ONCE
Status: COMPLETED | OUTPATIENT
Start: 2017-12-29 | End: 2017-12-29

## 2017-12-29 RX ADMIN — MORPHINE SULFATE 2 MG: 4 INJECTION INTRAVENOUS at 19:35

## 2017-12-29 RX ADMIN — SODIUM CHLORIDE 1000 ML: 9 INJECTION, SOLUTION INTRAVENOUS at 18:26

## 2017-12-29 RX ADMIN — ONDANSETRON 4 MG: 2 INJECTION INTRAMUSCULAR; INTRAVENOUS at 18:27

## 2017-12-29 RX ADMIN — PROMETHAZINE HYDROCHLORIDE 12.5 MG: 25 INJECTION INTRAMUSCULAR; INTRAVENOUS at 19:02

## 2017-12-29 RX ADMIN — MORPHINE SULFATE 4 MG: 4 INJECTION INTRAVENOUS at 18:27

## 2017-12-29 ASSESSMENT — PAIN SCALES - GENERAL
PAINLEVEL_OUTOF10: 7
PAINLEVEL_OUTOF10: 8

## 2017-12-29 ASSESSMENT — PAIN DESCRIPTION - LOCATION: LOCATION: ABDOMEN

## 2017-12-29 ASSESSMENT — PAIN DESCRIPTION - PAIN TYPE: TYPE: ACUTE PAIN;CHRONIC PAIN

## 2017-12-29 NOTE — ED NOTES
Pt to ed with c/o abdominal cramping and n/v for three days. Pt states she has hx of bowel obstruction, hasn't had a bm in approx 3 days. Pt arrived ambulatory, alert and oriented x4 with no distress noted. Pt states pain has gotten worse over last few days.       Mike Benton RN  12/29/17 [id number]

## 2017-12-29 NOTE — ED PROVIDER NOTES
machinery. Do not use in combination with alcohol. 12/29/17 1/5/18 Yes Ha Reyes MD   eszopiclone (ESZOPICLONE) 3 MG TABS Take 3 mg by mouth nightly . Historical Provider, MD   penicillin v potassium (VEETID) 500 MG tablet Take 1 tablet by mouth 4 times daily for 7 days 12/24/17 12/31/17  Leidy Galloway MD   traMADol (ULTRAM) 50 MG tablet Take 1 tablet by mouth every 6 hours as needed for Pain .  12/14/17   Shelbi Blank MD   dicyclomine (BENTYL) 10 MG capsule Take 1 capsule by mouth 4 times daily (before meals and nightly) 10/22/17   Jemal Terrazas PA-C   acetaminophen (APAP EXTRA STRENGTH) 500 MG tablet Take 1 tablet by mouth every 4 hours as needed for Pain (Take when out of Norco or when the pain is less severe, do not take at the same time as both contain acetaminophen) 9/22/17   Raissa Clemente,    ondansetron (ZOFRAN) 4 MG tablet Take 1 tablet by mouth every 8 hours as needed for Nausea 9/11/17   Ravi Obrien, DO   ondansetron (ZOFRAN ODT) 4 MG disintegrating tablet Take 1 tablet by mouth every 8 hours as needed for Nausea 8/22/17   Nicolle Mars MD   tiZANidine (ZANAFLEX) 4 MG tablet Take 1 tablet by mouth every 6 hours as needed (pain/spasm) 7/2/17   Josi Paulino,    ondansetron (ZOFRAN ODT) 4 MG disintegrating tablet Take 1 tablet by mouth every 8 hours as needed for Nausea 6/2/17   Becky Rodriguez MD   zolpidem (AMBIEN) 5 MG tablet Take 5 mg by mouth nightly as needed for Sleep    Historical Provider, MD   LORazepam (ATIVAN) 1 MG tablet Take 1 mg by mouth 2 times daily as needed for Anxiety    Historical Provider, MD   LORazepam (ATIVAN) 2 MG tablet Take 2 mg by mouth nightly as needed for Anxiety    Historical Provider, MD   esomeprazole Magnesium (NEXIUM) 40 MG PACK Take 40 mg by mouth daily    Historical Provider, MD   diltiazem (CARDIZEM CD) 240 MG extended release capsule Take 1 capsule by mouth daily 4/10/17   Bailey Weiss MD   DULoxetine (CYMBALTA) 60 MG Baldemar Haynes MD       REVIEW OF SYSTEMS    (2-9 systems for level 4, 10 or more for level 5)      Review of Systems   Constitutional: Positive for appetite change. Negative for activity change, chills, diaphoresis, fatigue and fever. HENT: Negative for congestion, rhinorrhea, sinus pain, sinus pressure and sneezing. Eyes: Negative for photophobia and visual disturbance. Respiratory: Negative for cough, shortness of breath, wheezing and stridor. Cardiovascular: Negative for chest pain and leg swelling. Gastrointestinal: Positive for abdominal pain, diarrhea, nausea and vomiting. Negative for constipation. Musculoskeletal: Negative for arthralgias and myalgias. Skin: Negative for rash. Neurological: Negative for dizziness, light-headedness and headaches. Psychiatric/Behavioral: Negative for agitation, behavioral problems and confusion. PHYSICAL EXAM   (up to 7 for level 4, 8 or more for level 5)      INITIAL VITALS:   BP (!) 158/92   Pulse 101   Temp 97.2 °F (36.2 °C) (Oral)   Resp 16   Wt 281 lb (127.5 kg)   SpO2 100%   BMI 42.73 kg/m²     Physical Exam   Constitutional: She appears well-developed and well-nourished. No distress. HENT:   Head: Normocephalic and atraumatic. Eyes: Conjunctivae and EOM are normal. Pupils are equal, round, and reactive to light. Neck: Normal range of motion. Neck supple. Cardiovascular: Regular rhythm and normal heart sounds. Tachycardia present. Pulmonary/Chest: Effort normal and breath sounds normal. No respiratory distress. She has no wheezes. She has no rales. Abdominal: Soft. She exhibits no distension. There is tenderness in the epigastric area. There is no rebound and no guarding. Extensive surgical incision scars that are well-healed present over the patient's entire abdomen. Patient has a small amount of epigastric abdominal tenderness palpation without any evidence of rigidity, guarding, rebound tenderness.   There is no overt

## 2017-12-30 ASSESSMENT — ENCOUNTER SYMPTOMS
PHOTOPHOBIA: 0
DIARRHEA: 1
SINUS PRESSURE: 0
COUGH: 0
SHORTNESS OF BREATH: 0
ABDOMINAL PAIN: 1
CONSTIPATION: 0
NAUSEA: 1
STRIDOR: 0
WHEEZING: 0
SINUS PAIN: 0
RHINORRHEA: 0
VOMITING: 1

## 2018-01-30 LAB
AVERAGE GLUCOSE: NORMAL
HBA1C MFR BLD: 10.2 %

## 2018-02-28 ENCOUNTER — HOSPITAL ENCOUNTER (EMERGENCY)
Age: 38
Discharge: HOME | End: 2018-03-01
Attending: EMERGENCY MEDICINE
Payer: MEDICARE

## 2018-02-28 ENCOUNTER — APPOINTMENT (OUTPATIENT)
Dept: GENERAL RADIOLOGY | Age: 38
End: 2018-02-28
Payer: MEDICARE

## 2018-02-28 VITALS
BODY MASS INDEX: 44.41 KG/M2 | OXYGEN SATURATION: 99 % | WEIGHT: 293 LBS | HEART RATE: 96 BPM | HEIGHT: 68 IN | TEMPERATURE: 98.1 F | SYSTOLIC BLOOD PRESSURE: 128 MMHG | DIASTOLIC BLOOD PRESSURE: 98 MMHG | RESPIRATION RATE: 18 BRPM

## 2018-02-28 DIAGNOSIS — R10.10 PAIN OF UPPER ABDOMEN: Primary | ICD-10-CM

## 2018-02-28 DIAGNOSIS — R11.2 NON-INTRACTABLE VOMITING WITH NAUSEA, UNSPECIFIED VOMITING TYPE: ICD-10-CM

## 2018-02-28 DIAGNOSIS — R73.9 HYPERGLYCEMIA: ICD-10-CM

## 2018-02-28 LAB
ABSOLUTE EOS #: 0.1 K/UL (ref 0–0.4)
ABSOLUTE IMMATURE GRANULOCYTE: NORMAL K/UL (ref 0–0.3)
ABSOLUTE LYMPH #: 1.8 K/UL (ref 1–4.8)
ABSOLUTE MONO #: 0.4 K/UL (ref 0.1–1.3)
ALBUMIN SERPL-MCNC: 3.8 G/DL (ref 3.5–5.2)
ALBUMIN/GLOBULIN RATIO: ABNORMAL (ref 1–2.5)
ALP BLD-CCNC: 150 U/L (ref 35–104)
ALT SERPL-CCNC: 25 U/L (ref 5–33)
ANION GAP SERPL CALCULATED.3IONS-SCNC: 16 MMOL/L (ref 9–17)
AST SERPL-CCNC: 19 U/L
BASOPHILS # BLD: 1 % (ref 0–2)
BASOPHILS ABSOLUTE: 0.1 K/UL (ref 0–0.2)
BILIRUB SERPL-MCNC: <0.15 MG/DL (ref 0.3–1.2)
BUN BLDV-MCNC: 17 MG/DL (ref 6–20)
BUN/CREAT BLD: ABNORMAL (ref 9–20)
CALCIUM SERPL-MCNC: 9.6 MG/DL (ref 8.6–10.4)
CHLORIDE BLD-SCNC: 97 MMOL/L (ref 98–107)
CO2: 22 MMOL/L (ref 20–31)
CREAT SERPL-MCNC: 0.51 MG/DL (ref 0.5–0.9)
DIFFERENTIAL TYPE: NORMAL
EOSINOPHILS RELATIVE PERCENT: 2 % (ref 0–4)
GFR AFRICAN AMERICAN: >60 ML/MIN
GFR NON-AFRICAN AMERICAN: >60 ML/MIN
GFR SERPL CREATININE-BSD FRML MDRD: ABNORMAL ML/MIN/{1.73_M2}
GFR SERPL CREATININE-BSD FRML MDRD: ABNORMAL ML/MIN/{1.73_M2}
GLUCOSE BLD-MCNC: 395 MG/DL (ref 70–99)
HCT VFR BLD CALC: 43.2 % (ref 36–46)
HEMOGLOBIN: 14.2 G/DL (ref 12–16)
IMMATURE GRANULOCYTES: NORMAL %
LACTIC ACID: 2 MMOL/L (ref 0.5–2.2)
LIPASE: 31 U/L (ref 13–60)
LYMPHOCYTES # BLD: 29 % (ref 24–44)
MCH RBC QN AUTO: 30.3 PG (ref 26–34)
MCHC RBC AUTO-ENTMCNC: 32.9 G/DL (ref 31–37)
MCV RBC AUTO: 92.1 FL (ref 80–100)
MONOCYTES # BLD: 7 % (ref 1–7)
NRBC AUTOMATED: NORMAL PER 100 WBC
PDW BLD-RTO: 12.9 % (ref 11.5–14.9)
PLATELET # BLD: 226 K/UL (ref 150–450)
PLATELET ESTIMATE: NORMAL
PMV BLD AUTO: 11.1 FL (ref 6–12)
POTASSIUM SERPL-SCNC: 4.3 MMOL/L (ref 3.7–5.3)
RBC # BLD: 4.69 M/UL (ref 4–5.2)
RBC # BLD: NORMAL 10*6/UL
SEG NEUTROPHILS: 61 % (ref 36–66)
SEGMENTED NEUTROPHILS ABSOLUTE COUNT: 3.8 K/UL (ref 1.3–9.1)
SODIUM BLD-SCNC: 135 MMOL/L (ref 135–144)
TOTAL PROTEIN: 7.5 G/DL (ref 6.4–8.3)
WBC # BLD: 6.1 K/UL (ref 3.5–11)
WBC # BLD: NORMAL 10*3/UL

## 2018-02-28 PROCEDURE — 74022 RADEX COMPL AQT ABD SERIES: CPT

## 2018-02-28 PROCEDURE — 85025 COMPLETE CBC W/AUTO DIFF WBC: CPT

## 2018-02-28 PROCEDURE — 80053 COMPREHEN METABOLIC PANEL: CPT

## 2018-02-28 PROCEDURE — 83690 ASSAY OF LIPASE: CPT

## 2018-02-28 PROCEDURE — 99284 EMERGENCY DEPT VISIT MOD MDM: CPT

## 2018-02-28 PROCEDURE — 96374 THER/PROPH/DIAG INJ IV PUSH: CPT

## 2018-02-28 PROCEDURE — 6370000000 HC RX 637 (ALT 250 FOR IP): Performed by: EMERGENCY MEDICINE

## 2018-02-28 PROCEDURE — 83605 ASSAY OF LACTIC ACID: CPT

## 2018-02-28 PROCEDURE — 6360000002 HC RX W HCPCS: Performed by: EMERGENCY MEDICINE

## 2018-02-28 PROCEDURE — 96375 TX/PRO/DX INJ NEW DRUG ADDON: CPT

## 2018-02-28 PROCEDURE — 36415 COLL VENOUS BLD VENIPUNCTURE: CPT

## 2018-02-28 PROCEDURE — 2580000003 HC RX 258: Performed by: EMERGENCY MEDICINE

## 2018-02-28 RX ORDER — 0.9 % SODIUM CHLORIDE 0.9 %
1000 INTRAVENOUS SOLUTION INTRAVENOUS ONCE
Status: COMPLETED | OUTPATIENT
Start: 2018-02-28 | End: 2018-02-28

## 2018-02-28 RX ORDER — MAGNESIUM HYDROXIDE/ALUMINUM HYDROXICE/SIMETHICONE 120; 1200; 1200 MG/30ML; MG/30ML; MG/30ML
30 SUSPENSION ORAL ONCE
Status: COMPLETED | OUTPATIENT
Start: 2018-02-28 | End: 2018-02-28

## 2018-02-28 RX ORDER — FENTANYL CITRATE 50 UG/ML
50 INJECTION, SOLUTION INTRAMUSCULAR; INTRAVENOUS ONCE
Status: COMPLETED | OUTPATIENT
Start: 2018-02-28 | End: 2018-02-28

## 2018-02-28 RX ORDER — ONDANSETRON 2 MG/ML
4 INJECTION INTRAMUSCULAR; INTRAVENOUS ONCE
Status: COMPLETED | OUTPATIENT
Start: 2018-02-28 | End: 2018-02-28

## 2018-02-28 RX ORDER — ONDANSETRON 2 MG/ML
4 INJECTION INTRAMUSCULAR; INTRAVENOUS ONCE
Status: COMPLETED | OUTPATIENT
Start: 2018-02-28 | End: 2018-03-01

## 2018-02-28 RX ADMIN — ONDANSETRON 4 MG: 2 INJECTION INTRAMUSCULAR; INTRAVENOUS at 22:46

## 2018-02-28 RX ADMIN — ALUMINUM HYDROXIDE, MAGNESIUM HYDROXIDE, AND SIMETHICONE 30 ML: 200; 200; 20 SUSPENSION ORAL at 22:56

## 2018-02-28 RX ADMIN — LIDOCAINE HYDROCHLORIDE 4 ML: 20 SOLUTION ORAL; TOPICAL at 22:56

## 2018-02-28 RX ADMIN — SODIUM CHLORIDE 1000 ML: 9 INJECTION, SOLUTION INTRAVENOUS at 22:46

## 2018-02-28 RX ADMIN — FENTANYL CITRATE 50 MCG: 50 INJECTION, SOLUTION INTRAMUSCULAR; INTRAVENOUS at 22:46

## 2018-02-28 ASSESSMENT — PAIN SCALES - GENERAL
PAINLEVEL_OUTOF10: 7
PAINLEVEL_OUTOF10: 7

## 2018-02-28 ASSESSMENT — ENCOUNTER SYMPTOMS
EYE PAIN: 0
ABDOMINAL PAIN: 1
CONSTIPATION: 0
NAUSEA: 1
SHORTNESS OF BREATH: 0
EYE REDNESS: 0
DIARRHEA: 0
COUGH: 0
VOMITING: 1
RHINORRHEA: 0

## 2018-02-28 ASSESSMENT — PAIN DESCRIPTION - FREQUENCY: FREQUENCY: CONTINUOUS

## 2018-02-28 ASSESSMENT — PAIN DESCRIPTION - DESCRIPTORS: DESCRIPTORS: BURNING;SHARP

## 2018-02-28 ASSESSMENT — PAIN DESCRIPTION - PAIN TYPE: TYPE: ACUTE PAIN

## 2018-02-28 ASSESSMENT — PAIN DESCRIPTION - LOCATION: LOCATION: ABDOMEN

## 2018-02-28 ASSESSMENT — PAIN DESCRIPTION - ORIENTATION: ORIENTATION: UPPER

## 2018-03-01 PROCEDURE — 96376 TX/PRO/DX INJ SAME DRUG ADON: CPT

## 2018-03-01 PROCEDURE — 6360000002 HC RX W HCPCS: Performed by: EMERGENCY MEDICINE

## 2018-03-01 RX ORDER — FENTANYL CITRATE 50 UG/ML
50 INJECTION, SOLUTION INTRAMUSCULAR; INTRAVENOUS ONCE
Status: COMPLETED | OUTPATIENT
Start: 2018-03-01 | End: 2018-03-01

## 2018-03-01 RX ADMIN — ONDANSETRON 4 MG: 2 INJECTION INTRAMUSCULAR; INTRAVENOUS at 00:02

## 2018-03-01 RX ADMIN — FENTANYL CITRATE 50 MCG: 50 INJECTION INTRAMUSCULAR; INTRAVENOUS at 00:28

## 2018-03-01 ASSESSMENT — PAIN SCALES - GENERAL: PAINLEVEL_OUTOF10: 7

## 2018-03-01 NOTE — ED NOTES
Pt discharged in stable condition. Pt advised to follow up with PCP and return to ED if symptoms worsen. Pt is AOx4 and answering questions appropriately. Skin is PWD. Pt has steady gait upon discharge. Pt states her ride is on the way and will be here at 0100.       Moose Zheng RN  03/01/18 [id number]

## 2018-05-20 ENCOUNTER — HOSPITAL ENCOUNTER (EMERGENCY)
Age: 38
Discharge: HOME | End: 2018-05-20
Attending: EMERGENCY MEDICINE
Payer: MEDICARE

## 2018-05-20 ENCOUNTER — APPOINTMENT (OUTPATIENT)
Dept: CT IMAGING | Age: 38
End: 2018-05-20
Payer: MEDICARE

## 2018-05-20 ENCOUNTER — APPOINTMENT (OUTPATIENT)
Dept: GENERAL RADIOLOGY | Age: 38
End: 2018-05-20
Payer: MEDICARE

## 2018-05-20 VITALS
HEIGHT: 68 IN | DIASTOLIC BLOOD PRESSURE: 91 MMHG | BODY MASS INDEX: 44.41 KG/M2 | SYSTOLIC BLOOD PRESSURE: 161 MMHG | TEMPERATURE: 98 F | OXYGEN SATURATION: 97 % | RESPIRATION RATE: 14 BRPM | WEIGHT: 293 LBS | HEART RATE: 103 BPM

## 2018-05-20 DIAGNOSIS — M79.604 ACUTE PAIN OF RIGHT LOWER EXTREMITY: Primary | ICD-10-CM

## 2018-05-20 DIAGNOSIS — R07.89 CHEST PRESSURE: ICD-10-CM

## 2018-05-20 LAB
ABSOLUTE EOS #: 0.17 K/UL (ref 0–0.44)
ABSOLUTE IMMATURE GRANULOCYTE: 0.03 K/UL (ref 0–0.3)
ABSOLUTE LYMPH #: 1.88 K/UL (ref 1.1–3.7)
ABSOLUTE MONO #: 0.55 K/UL (ref 0.1–1.2)
ANION GAP SERPL CALCULATED.3IONS-SCNC: 12 MMOL/L (ref 9–17)
BASOPHILS # BLD: 0 % (ref 0–2)
BASOPHILS ABSOLUTE: 0.03 K/UL (ref 0–0.2)
BUN BLDV-MCNC: 18 MG/DL (ref 6–20)
BUN/CREAT BLD: ABNORMAL (ref 9–20)
CALCIUM SERPL-MCNC: 9.1 MG/DL (ref 8.6–10.4)
CHLORIDE BLD-SCNC: 104 MMOL/L (ref 98–107)
CO2: 21 MMOL/L (ref 20–31)
CREAT SERPL-MCNC: 0.59 MG/DL (ref 0.5–0.9)
DIFFERENTIAL TYPE: ABNORMAL
EKG ATRIAL RATE: 123 BPM
EKG P AXIS: 48 DEGREES
EKG P-R INTERVAL: 140 MS
EKG Q-T INTERVAL: 330 MS
EKG QRS DURATION: 86 MS
EKG QTC CALCULATION (BAZETT): 472 MS
EKG R AXIS: 33 DEGREES
EKG T AXIS: 36 DEGREES
EKG VENTRICULAR RATE: 123 BPM
EOSINOPHILS RELATIVE PERCENT: 2 % (ref 1–4)
GFR AFRICAN AMERICAN: >60 ML/MIN
GFR NON-AFRICAN AMERICAN: >60 ML/MIN
GFR SERPL CREATININE-BSD FRML MDRD: ABNORMAL ML/MIN/{1.73_M2}
GFR SERPL CREATININE-BSD FRML MDRD: ABNORMAL ML/MIN/{1.73_M2}
GLUCOSE BLD-MCNC: 130 MG/DL (ref 70–99)
HCG QUALITATIVE: NEGATIVE
HCT VFR BLD CALC: 44.1 % (ref 36.3–47.1)
HEMOGLOBIN: 14.3 G/DL (ref 11.9–15.1)
IMMATURE GRANULOCYTES: 0 %
INR BLD: 0.9
LYMPHOCYTES # BLD: 24 % (ref 24–43)
MCH RBC QN AUTO: 29.2 PG (ref 25.2–33.5)
MCHC RBC AUTO-ENTMCNC: 32.4 G/DL (ref 28.4–34.8)
MCV RBC AUTO: 90.2 FL (ref 82.6–102.9)
MONOCYTES # BLD: 7 % (ref 3–12)
NRBC AUTOMATED: 0 PER 100 WBC
PARTIAL THROMBOPLASTIN TIME: 25.9 SEC (ref 20.5–30.5)
PDW BLD-RTO: 12.7 % (ref 11.8–14.4)
PLATELET # BLD: 277 K/UL (ref 138–453)
PLATELET ESTIMATE: ABNORMAL
PMV BLD AUTO: 12.5 FL (ref 8.1–13.5)
POC TROPONIN I: 0 NG/ML (ref 0–0.1)
POC TROPONIN I: 0 NG/ML (ref 0–0.1)
POC TROPONIN INTERP: NORMAL
POC TROPONIN INTERP: NORMAL
POTASSIUM SERPL-SCNC: 3.8 MMOL/L (ref 3.7–5.3)
PROTHROMBIN TIME: 9.4 SEC (ref 9–12)
RBC # BLD: 4.89 M/UL (ref 3.95–5.11)
RBC # BLD: ABNORMAL 10*6/UL
SEG NEUTROPHILS: 67 % (ref 36–65)
SEGMENTED NEUTROPHILS ABSOLUTE COUNT: 5.21 K/UL (ref 1.5–8.1)
SODIUM BLD-SCNC: 137 MMOL/L (ref 135–144)
WBC # BLD: 7.9 K/UL (ref 3.5–11.3)
WBC # BLD: ABNORMAL 10*3/UL

## 2018-05-20 PROCEDURE — 85730 THROMBOPLASTIN TIME PARTIAL: CPT

## 2018-05-20 PROCEDURE — 85610 PROTHROMBIN TIME: CPT

## 2018-05-20 PROCEDURE — 84484 ASSAY OF TROPONIN QUANT: CPT

## 2018-05-20 PROCEDURE — 85025 COMPLETE CBC W/AUTO DIFF WBC: CPT

## 2018-05-20 PROCEDURE — 6360000004 HC RX CONTRAST MEDICATION: Performed by: EMERGENCY MEDICINE

## 2018-05-20 PROCEDURE — 71260 CT THORAX DX C+: CPT

## 2018-05-20 PROCEDURE — 96372 THER/PROPH/DIAG INJ SC/IM: CPT

## 2018-05-20 PROCEDURE — 93005 ELECTROCARDIOGRAM TRACING: CPT

## 2018-05-20 PROCEDURE — 6360000002 HC RX W HCPCS: Performed by: EMERGENCY MEDICINE

## 2018-05-20 PROCEDURE — 80048 BASIC METABOLIC PNL TOTAL CA: CPT

## 2018-05-20 PROCEDURE — 84703 CHORIONIC GONADOTROPIN ASSAY: CPT

## 2018-05-20 PROCEDURE — G0384 LEV 5 HOSP TYPE B ED VISIT: HCPCS

## 2018-05-20 PROCEDURE — 71046 X-RAY EXAM CHEST 2 VIEWS: CPT

## 2018-05-20 RX ORDER — FENTANYL CITRATE 50 UG/ML
50 INJECTION, SOLUTION INTRAMUSCULAR; INTRAVENOUS ONCE
Status: COMPLETED | OUTPATIENT
Start: 2018-05-20 | End: 2018-05-20

## 2018-05-20 RX ADMIN — FENTANYL CITRATE 50 MCG: 50 INJECTION INTRAMUSCULAR; INTRAVENOUS at 20:27

## 2018-05-20 RX ADMIN — IOPAMIDOL 85 ML: 755 INJECTION, SOLUTION INTRAVENOUS at 21:45

## 2018-05-20 ASSESSMENT — PAIN DESCRIPTION - DESCRIPTORS: DESCRIPTORS: ACHING;SORE

## 2018-05-20 ASSESSMENT — PAIN DESCRIPTION - LOCATION: LOCATION: LEG

## 2018-05-20 ASSESSMENT — PAIN DESCRIPTION - FREQUENCY: FREQUENCY: CONTINUOUS

## 2018-05-20 ASSESSMENT — PAIN DESCRIPTION - ORIENTATION: ORIENTATION: LEFT;LOWER

## 2018-05-20 ASSESSMENT — PAIN SCALES - GENERAL
PAINLEVEL_OUTOF10: 8
PAINLEVEL_OUTOF10: 8

## 2018-05-20 ASSESSMENT — PAIN DESCRIPTION - PAIN TYPE: TYPE: ACUTE PAIN

## 2018-05-21 ASSESSMENT — ENCOUNTER SYMPTOMS
SHORTNESS OF BREATH: 1
CONSTIPATION: 0
PHOTOPHOBIA: 0
SORE THROAT: 0
BLOOD IN STOOL: 0
RHINORRHEA: 0
SINUS PRESSURE: 0
DIARRHEA: 0
NAUSEA: 0
VOMITING: 0
COUGH: 0
BACK PAIN: 0
ABDOMINAL PAIN: 0

## 2018-05-30 ENCOUNTER — HOSPITAL ENCOUNTER (EMERGENCY)
Age: 38
Discharge: HOME | End: 2018-05-30
Attending: EMERGENCY MEDICINE
Payer: MEDICARE

## 2018-05-30 VITALS
WEIGHT: 293 LBS | BODY MASS INDEX: 43.4 KG/M2 | HEIGHT: 69 IN | TEMPERATURE: 97.8 F | OXYGEN SATURATION: 100 % | DIASTOLIC BLOOD PRESSURE: 100 MMHG | RESPIRATION RATE: 17 BRPM | HEART RATE: 79 BPM | SYSTOLIC BLOOD PRESSURE: 128 MMHG

## 2018-05-30 DIAGNOSIS — K02.9 DENTAL CARIES: Primary | ICD-10-CM

## 2018-05-30 PROCEDURE — 6370000000 HC RX 637 (ALT 250 FOR IP): Performed by: PHYSICIAN ASSISTANT

## 2018-05-30 PROCEDURE — 99282 EMERGENCY DEPT VISIT SF MDM: CPT

## 2018-05-30 RX ORDER — CHLORHEXIDINE GLUCONATE 0.12 MG/ML
15 RINSE ORAL 2 TIMES DAILY
Qty: 420 ML | Refills: 0 | Status: SHIPPED | OUTPATIENT
Start: 2018-05-30 | End: 2018-06-13

## 2018-05-30 RX ORDER — PENICILLIN V POTASSIUM 250 MG/1
500 TABLET ORAL ONCE
Status: COMPLETED | OUTPATIENT
Start: 2018-05-30 | End: 2018-05-30

## 2018-05-30 RX ORDER — HYDROCODONE BITARTRATE AND ACETAMINOPHEN 5; 325 MG/1; MG/1
1 TABLET ORAL ONCE
Status: COMPLETED | OUTPATIENT
Start: 2018-05-30 | End: 2018-05-30

## 2018-05-30 RX ORDER — IBUPROFEN 600 MG/1
600 TABLET ORAL EVERY 8 HOURS PRN
Qty: 30 TABLET | Refills: 0 | Status: SHIPPED | OUTPATIENT
Start: 2018-05-30 | End: 2019-02-22

## 2018-05-30 RX ORDER — PENICILLIN V POTASSIUM 500 MG/1
500 TABLET ORAL 4 TIMES DAILY
Qty: 40 TABLET | Refills: 0 | Status: ON HOLD | OUTPATIENT
Start: 2018-05-30 | End: 2018-11-21 | Stop reason: HOSPADM

## 2018-05-30 RX ADMIN — HYDROCODONE BITARTRATE AND ACETAMINOPHEN 1 TABLET: 5; 325 TABLET ORAL at 13:49

## 2018-05-30 RX ADMIN — PENICILLIN V POTASIUM 500 MG: 250 TABLET ORAL at 13:49

## 2018-05-30 ASSESSMENT — ENCOUNTER SYMPTOMS: TRISMUS: 0

## 2018-05-30 ASSESSMENT — PAIN SCALES - GENERAL
PAINLEVEL_OUTOF10: 8
PAINLEVEL_OUTOF10: 8

## 2018-06-14 ENCOUNTER — HOSPITAL ENCOUNTER (EMERGENCY)
Age: 38
Discharge: HOME | End: 2018-06-14
Attending: EMERGENCY MEDICINE
Payer: MEDICARE

## 2018-06-14 ENCOUNTER — APPOINTMENT (OUTPATIENT)
Dept: GENERAL RADIOLOGY | Age: 38
End: 2018-06-14
Payer: MEDICARE

## 2018-06-14 VITALS
OXYGEN SATURATION: 98 % | HEIGHT: 68 IN | TEMPERATURE: 98.7 F | RESPIRATION RATE: 16 BRPM | WEIGHT: 280 LBS | HEART RATE: 111 BPM | BODY MASS INDEX: 42.44 KG/M2 | SYSTOLIC BLOOD PRESSURE: 143 MMHG | DIASTOLIC BLOOD PRESSURE: 80 MMHG

## 2018-06-14 DIAGNOSIS — S61.214A LACERATION OF RIGHT RING FINGER WITHOUT FOREIGN BODY WITHOUT DAMAGE TO NAIL, INITIAL ENCOUNTER: Primary | ICD-10-CM

## 2018-06-14 PROCEDURE — 99283 EMERGENCY DEPT VISIT LOW MDM: CPT

## 2018-06-14 PROCEDURE — 73120 X-RAY EXAM OF HAND: CPT

## 2018-06-14 PROCEDURE — 12002 RPR S/N/AX/GEN/TRNK2.6-7.5CM: CPT

## 2018-06-14 PROCEDURE — 2500000003 HC RX 250 WO HCPCS: Performed by: EMERGENCY MEDICINE

## 2018-06-14 RX ORDER — LIDOCAINE HYDROCHLORIDE 10 MG/ML
30 INJECTION, SOLUTION EPIDURAL; INFILTRATION; INTRACAUDAL; PERINEURAL ONCE
Status: COMPLETED | OUTPATIENT
Start: 2018-06-14 | End: 2018-06-14

## 2018-06-14 RX ADMIN — LIDOCAINE HYDROCHLORIDE 30 ML: 10 INJECTION, SOLUTION EPIDURAL; INFILTRATION; INTRACAUDAL; PERINEURAL at 00:26

## 2018-06-14 ASSESSMENT — PAIN SCALES - GENERAL: PAINLEVEL_OUTOF10: 8

## 2018-06-25 ENCOUNTER — HOSPITAL ENCOUNTER (EMERGENCY)
Age: 38
Discharge: HOME | End: 2018-06-25
Attending: EMERGENCY MEDICINE
Payer: MEDICARE

## 2018-06-25 VITALS
SYSTOLIC BLOOD PRESSURE: 132 MMHG | DIASTOLIC BLOOD PRESSURE: 96 MMHG | BODY MASS INDEX: 43.4 KG/M2 | RESPIRATION RATE: 18 BRPM | OXYGEN SATURATION: 100 % | TEMPERATURE: 97.9 F | HEIGHT: 69 IN | HEART RATE: 99 BPM | WEIGHT: 293 LBS

## 2018-06-25 DIAGNOSIS — Z48.02 VISIT FOR SUTURE REMOVAL: Primary | ICD-10-CM

## 2018-06-25 PROCEDURE — 99281 EMR DPT VST MAYX REQ PHY/QHP: CPT

## 2018-07-01 ENCOUNTER — HOSPITAL ENCOUNTER (EMERGENCY)
Age: 38
Discharge: HOME | End: 2018-07-01
Attending: EMERGENCY MEDICINE
Payer: MEDICARE

## 2018-07-01 VITALS
DIASTOLIC BLOOD PRESSURE: 99 MMHG | BODY MASS INDEX: 43.4 KG/M2 | SYSTOLIC BLOOD PRESSURE: 179 MMHG | HEART RATE: 98 BPM | TEMPERATURE: 97.5 F | OXYGEN SATURATION: 100 % | WEIGHT: 293 LBS | HEIGHT: 69 IN | RESPIRATION RATE: 14 BRPM

## 2018-07-01 DIAGNOSIS — K08.89 PAIN, DENTAL: Primary | ICD-10-CM

## 2018-07-01 PROCEDURE — 99282 EMERGENCY DEPT VISIT SF MDM: CPT

## 2018-07-01 PROCEDURE — 6370000000 HC RX 637 (ALT 250 FOR IP): Performed by: EMERGENCY MEDICINE

## 2018-07-01 RX ORDER — AMOXICILLIN 250 MG/1
500 CAPSULE ORAL ONCE
Status: COMPLETED | OUTPATIENT
Start: 2018-07-01 | End: 2018-07-01

## 2018-07-01 RX ORDER — AMOXICILLIN 500 MG/1
500 CAPSULE ORAL 2 TIMES DAILY
Qty: 20 CAPSULE | Refills: 0 | Status: SHIPPED | OUTPATIENT
Start: 2018-07-01 | End: 2018-07-11

## 2018-07-01 RX ADMIN — AMOXICILLIN 500 MG: 250 CAPSULE ORAL at 05:11

## 2018-07-01 ASSESSMENT — PAIN DESCRIPTION - PAIN TYPE: TYPE: ACUTE PAIN

## 2018-07-01 ASSESSMENT — PAIN DESCRIPTION - DESCRIPTORS: DESCRIPTORS: ACHING;SHARP;RADIATING;CONSTANT

## 2018-07-01 ASSESSMENT — PAIN DESCRIPTION - FREQUENCY: FREQUENCY: CONTINUOUS

## 2018-07-01 ASSESSMENT — ENCOUNTER SYMPTOMS
RESPIRATORY NEGATIVE: 1
EYES NEGATIVE: 1
GASTROINTESTINAL NEGATIVE: 1

## 2018-07-01 ASSESSMENT — PAIN DESCRIPTION - ORIENTATION: ORIENTATION: RIGHT

## 2018-07-01 ASSESSMENT — PAIN DESCRIPTION - LOCATION: LOCATION: JAW;TEETH

## 2018-07-01 ASSESSMENT — PAIN SCALES - GENERAL: PAINLEVEL_OUTOF10: 9

## 2018-07-01 NOTE — ED PROVIDER NOTES
capsule to apple juice or apple sauce, but not chocolate. ESOMEPRAZOLE MAGNESIUM (NEXIUM) 40 MG PACK    Take 40 mg by mouth daily    ESZOPICLONE (ESZOPICLONE) 3 MG TABS    Take 3 mg by mouth nightly . FONDAPARINUX (ARIXTRA) 10 MG/0.8ML SOLN INJECTION    INJECT 0.8MLS INTO THE SKIN DAILY    GLUCOSE MONITORING KIT (FREESTYLE) MONITORING KIT    1 kit by Does not apply route daily as needed    IBUPROFEN (ADVIL;MOTRIN) 600 MG TABLET    Take 1 tablet by mouth every 8 hours as needed for Pain    INSULIN ASPART (NOVOLOG FLEXPEN) 100 UNIT/ML INJECTION PEN    Inject 5 Units into the skin 3 times daily (before meals) Hold if sugar below 100    INSULIN GLARGINE (LANTUS) 100 UNIT/ML INJECTION VIAL    Inject 30 Units into the skin nightly    INSULIN PEN NEEDLE 31G X 6 MM MISC    1 each by Does not apply route 2 times daily    LANCETS MISC    1 each by Other route 3 times daily    LIDOCAINE (XYLOCAINE) 5 % OINTMENT    Apply topically as needed. LISINOPRIL (PRINIVIL;ZESTRIL) 5 MG TABLET    Take 1 tablet by mouth Daily with lunch    LORAZEPAM (ATIVAN) 1 MG TABLET    Take 1 mg by mouth 2 times daily as needed for Anxiety    LORAZEPAM (ATIVAN) 2 MG TABLET    Take 2 mg by mouth nightly as needed for Anxiety    MAGNESIUM OXIDE (MAG-OX) 400 (241.3 MG) MG TABS TABLET    Take 1 tablet by mouth 2 times daily    MENTHOL, TOPICAL ANALGESIC, 5 % PADS    Apply to the chest wall, as needed for pain, daily. ONDANSETRON (ZOFRAN ODT) 4 MG DISINTEGRATING TABLET    Take 1 tablet by mouth every 8 hours as needed for Nausea    ONDANSETRON (ZOFRAN ODT) 4 MG DISINTEGRATING TABLET    Take 1 tablet by mouth every 8 hours as needed for Nausea    ONDANSETRON (ZOFRAN) 4 MG TABLET    Take 1 tablet by mouth every 8 hours as needed for Nausea    PENICILLIN V POTASSIUM (VEETID) 500 MG TABLET    Take 1 tablet by mouth 4 times daily    SILVER SULFADIAZINE (SILVADENE) 1 % CREAM    Apply topically daily.     TIZANIDINE (ZANAFLEX) 4 MG TABLET    Take 1

## 2018-07-01 NOTE — ED NOTES
Pt states that she is being treated with arixtra injections for DVT prevention. This regimen does not allow her to get her teeth pulled and the teeth are causing pain in the jaw and ear on the right. Pt states that she has a number of cracked teeth that need attention but is afraid to stop the blood thinner for fear of DVT.      Opal Trejo RN  07/01/18 7301

## 2018-08-06 ENCOUNTER — HOSPITAL ENCOUNTER (EMERGENCY)
Age: 38
Discharge: HOME | End: 2018-08-06
Attending: EMERGENCY MEDICINE
Payer: MEDICARE

## 2018-08-06 VITALS
HEART RATE: 119 BPM | BODY MASS INDEX: 42.83 KG/M2 | TEMPERATURE: 98.4 F | DIASTOLIC BLOOD PRESSURE: 74 MMHG | RESPIRATION RATE: 16 BRPM | SYSTOLIC BLOOD PRESSURE: 138 MMHG | OXYGEN SATURATION: 98 % | WEIGHT: 290 LBS

## 2018-08-06 DIAGNOSIS — R51.9 FACIAL PAIN: ICD-10-CM

## 2018-08-06 DIAGNOSIS — Z76.5 DRUG-SEEKING BEHAVIOR: Primary | ICD-10-CM

## 2018-08-06 PROCEDURE — 99282 EMERGENCY DEPT VISIT SF MDM: CPT

## 2018-08-06 ASSESSMENT — ENCOUNTER SYMPTOMS
COUGH: 0
EYE PAIN: 0
FACIAL SWELLING: 1
NAUSEA: 0
VOMITING: 0
SHORTNESS OF BREATH: 0
DIARRHEA: 0
ABDOMINAL PAIN: 0
BACK PAIN: 0
SORE THROAT: 0

## 2018-08-06 ASSESSMENT — PAIN SCALES - GENERAL: PAINLEVEL_OUTOF10: 8

## 2018-08-06 ASSESSMENT — PAIN DESCRIPTION - ORIENTATION: ORIENTATION: RIGHT

## 2018-08-06 ASSESSMENT — PAIN DESCRIPTION - LOCATION: LOCATION: TEETH

## 2018-08-06 ASSESSMENT — PAIN DESCRIPTION - PAIN TYPE: TYPE: ACUTE PAIN

## 2018-08-06 NOTE — ED NOTES
..Pt discharged; pt A&Ox4 at this time. Pt given discharge instructions, this RN went over discharge instructions with pt and pt denies questions about care at home. Pt informed to follow up with PCP. Pt informed that they can return to the department for reevaluation at anytime if symptoms worsen. Pt ambulatory out of department with steady gait and paperwork in hand.          Lencho Mcdowell RN  08/06/18 9782

## 2018-08-06 NOTE — ED PROVIDER NOTES
[address] ED  eMERGENCY dEPARTMENT eNCOUnter      Pt Name: Nataly Lopes  MRN: [id number]  [place] 1980  Date of evaluation: 8/6/18      CHIEF COMPLAINT:  Chief Complaint   Patient presents with    Dental Pain       HISTORY OF PRESENT ILLNESS    Nataly Lopes is a 43 y.o. female who presents with Patient complains of fall injuring her teeth, Has had multiple fractured teeth, has multiple comorbidities and can only have dental procedures done under general anesthetic. Patient denies treatment for these recent injuries. Patient denies nausea vomiting diarrhea chest returns breath fevers or chills. Facial swelling pain, Trail last 4 days when she struck the front of her teeth on a table or countertop after trip and fall, quality is dull. Concentration no modifying factor, moderate severity. REVIEW OF SYSTEMS       Review of Systems   Constitutional: Negative for chills and fever. HENT: Positive for dental problem and facial swelling. Negative for ear pain and sore throat. Eyes: Negative for pain and visual disturbance. Respiratory: Negative for cough and shortness of breath. Cardiovascular: Negative for chest pain. Gastrointestinal: Negative for abdominal pain, diarrhea, nausea and vomiting. Endocrine: Negative for polydipsia and polyuria. Genitourinary: Negative for dysuria, hematuria and vaginal discharge. Musculoskeletal: Negative for arthralgias and back pain. Skin: Negative for rash. Allergic/Immunologic: Negative for food allergies. Neurological: Negative for weakness, numbness and headaches. Hematological: Does not bruise/bleed easily. Psychiatric/Behavioral: Negative for self-injury and suicidal ideas. The patient is not nervous/anxious. PAST MEDICAL HISTORY   PMH:  has a past medical history of Alcohol abuse; Anxiety and depression; Arthritis; Painting esophagus; Bipolar disorder, unspecified ([facility] [address]  Broken rib; Depression; Diabetes mellitus

## 2018-08-30 ENCOUNTER — HOSPITAL ENCOUNTER (EMERGENCY)
Age: 38
Discharge: HOME | End: 2018-08-30
Attending: EMERGENCY MEDICINE
Payer: MEDICARE

## 2018-08-30 VITALS
OXYGEN SATURATION: 100 % | DIASTOLIC BLOOD PRESSURE: 87 MMHG | TEMPERATURE: 97.9 F | RESPIRATION RATE: 18 BRPM | WEIGHT: 293 LBS | HEART RATE: 110 BPM | BODY MASS INDEX: 43.4 KG/M2 | HEIGHT: 69 IN | SYSTOLIC BLOOD PRESSURE: 145 MMHG

## 2018-08-30 DIAGNOSIS — K04.7 DENTAL INFECTION: ICD-10-CM

## 2018-08-30 DIAGNOSIS — K08.9 CHRONIC DENTAL PAIN: Primary | ICD-10-CM

## 2018-08-30 DIAGNOSIS — G89.29 CHRONIC DENTAL PAIN: Primary | ICD-10-CM

## 2018-08-30 DIAGNOSIS — Z76.5 DRUG-SEEKING BEHAVIOR: ICD-10-CM

## 2018-08-30 PROCEDURE — 6370000000 HC RX 637 (ALT 250 FOR IP): Performed by: NURSE PRACTITIONER

## 2018-08-30 PROCEDURE — 99282 EMERGENCY DEPT VISIT SF MDM: CPT

## 2018-08-30 RX ORDER — PENICILLIN V POTASSIUM 500 MG/1
500 TABLET ORAL 4 TIMES DAILY
Qty: 40 TABLET | Refills: 0 | Status: SHIPPED | OUTPATIENT
Start: 2018-08-30 | End: 2018-09-09

## 2018-08-30 RX ORDER — PENICILLIN V POTASSIUM 250 MG/1
500 TABLET ORAL ONCE
Status: COMPLETED | OUTPATIENT
Start: 2018-08-30 | End: 2018-08-30

## 2018-08-30 RX ORDER — HYDROCODONE BITARTRATE AND ACETAMINOPHEN 5; 325 MG/1; MG/1
1 TABLET ORAL ONCE
Status: COMPLETED | OUTPATIENT
Start: 2018-08-30 | End: 2018-08-30

## 2018-08-30 RX ADMIN — HYDROCODONE BITARTRATE AND ACETAMINOPHEN 1 TABLET: 5; 325 TABLET ORAL at 22:17

## 2018-08-30 RX ADMIN — PENICILLIN V POTASIUM 500 MG: 250 TABLET ORAL at 22:17

## 2018-08-30 ASSESSMENT — ENCOUNTER SYMPTOMS
VOMITING: 0
TROUBLE SWALLOWING: 0
SHORTNESS OF BREATH: 0
NAUSEA: 0
COUGH: 0

## 2018-08-30 ASSESSMENT — PAIN SCALES - GENERAL: PAINLEVEL_OUTOF10: 8

## 2018-08-31 NOTE — ED PROVIDER NOTES
[address] ED  eMERGENCY dEPARTMENT eNCOUnter      Pt Name: Barbara Fregoso  MRN: [id number]  [place] 1980  Date of evaluation: 8/30/2018  Provider: BERNARDA Montanez [id number]       Chief Complaint   Patient presents with    Dental Pain     chronic dental pain         HISTORY OF PRESENT ILLNESS  (Location/Symptom, Timing/Onset, Context/Setting, Quality, Duration, Modifying Factors, Severity.)   Barbara Fregoso is a 43 y.o. female who presents to the emergency department with complaints of dental pain toRight lower tooth. Patient states that she has small abscess with green slimy pus that drains when she pushes against her gum. Patient is awaiting oral surgery but is having issues due to being on Arixtra. Patient states that she is awaiting new oral x-rays before oral surgeon can see her. She saw her PCP on August 21 and was told to follow-up with dentist and oral surgeon. Patient states that she was given prescription for Percocet 2 weeks ago but is currently out. She cannot take NSAIDs due to being on a blood thinner. Patient denies facial swelling, fever, chills, nausea, vomiting, trouble breathing or swallowing. Nursing Notes were reviewed and I agree. REVIEW OF SYSTEMS    (2-9 systems for level 4, 10 or more for level 5)     Review of Systems   Constitutional: Negative for chills and fever. HENT: Positive for dental problem. Negative for trouble swallowing. Respiratory: Negative for cough and shortness of breath. Cardiovascular: Negative for chest pain and palpitations. Gastrointestinal: Negative for nausea and vomiting. Except as noted above the remainder of the review of systems was reviewed and negative.        PAST MEDICAL HISTORY         Diagnosis Date    Alcohol abuse     Anxiety and depression     Arthritis     Painting esophagus     Bipolar disorder, unspecified ([facility] [address]     Broken rib 3/7/16    3 ribs on left and 2 on right    Depression     Diabetes mellitus ([facility] [address]     Diabetes mellitus ([facility] [address]     on glucophage    Embolism and thrombosis of unspecified site     Genital herpes, unspecified     GERD (gastroesophageal reflux disease)     History of blood transfusion 2 yrs ago    s/p surgery    History of hypertension     prior to gastric bypass    Hx of blood clots dx 2 years ago    clots in both legs and lungs     Hypertension     Hypoglycemia, unspecified     Lumbosacral spondylosis without myelopathy     MDRO (multiple drug resistant organisms) resistance 2010    MRSA (abd)    Miscarriage     multiple, around 7th mos pregnant each time d/t hypercoagulation    MRSA (methicillin resistant staph aureus) culture positive 02/10/2017    urine    Overdose of drug     has been hospitalized for and admitted to psych    Pernicious anemia     Primary hypercoagulable state ([facility] [address]     antiphospholipid antibodies, on xarelto    Suicide attempt ([facility] [address]     hx OD on painkillers and rubbing alcohol    SVT (supraventricular tachycardia) ([facility] [address] 04/07/2017    Type II or unspecified type diabetes mellitus without mention of complication, not stated as uncontrolled     taking metformin     Reviewed. SURGICAL HISTORY           Procedure Laterality Date    ABDOMINAL HERNIA REPAIR  2014    wound vac    ABDOMINAL HERNIA REPAIR  11/3/14    BARIATRIC SURGERY  2013    BARIATRIC SURGERY  2013    CHOLECYSTECTOMY      DILATION AND CURETTAGE OF UTERUS  2005    ENDOSCOPY, COLON, DIAGNOSTIC      EGD    FINGER AMPUTATION Left 02/09/2015    index and ring finger    HERNIA REPAIR      total of 8    HYSTERECTOMY      LIVER RESECTION      for hepatic adenoma    TONSILLECTOMY       Reviewed.   CURRENT MEDICATIONS       Previous Medications    ACETAMINOPHEN (APAP EXTRA STRENGTH) 500 MG TABLET    Take 1 tablet by mouth every 4 hours as needed for Pain (Take when out of Norco or when the pain is less severe, do not take at the same time as both contain acetaminophen) hours as needed for Nausea    ONDANSETRON (ZOFRAN ODT) 4 MG DISINTEGRATING TABLET    Take 1 tablet by mouth every 8 hours as needed for Nausea    ONDANSETRON (ZOFRAN) 4 MG TABLET    Take 1 tablet by mouth every 8 hours as needed for Nausea    PENICILLIN V POTASSIUM (VEETID) 500 MG TABLET    Take 1 tablet by mouth 4 times daily    SILVER SULFADIAZINE (SILVADENE) 1 % CREAM    Apply topically daily. TIZANIDINE (ZANAFLEX) 4 MG TABLET    Take 1 tablet by mouth every 6 hours as needed (pain/spasm)    TRAMADOL (ULTRAM) 50 MG TABLET    Take 1 tablet by mouth every 6 hours as needed for Pain . VITAMIN D (CHOLECALCIFEROL) 1000 UNIT TABS TABLET    Take 10,000 Units by mouth daily None for about 1 week, states not given while in hospital    ZOLPIDEM (AMBIEN) 5 MG TABLET    Take 5 mg by mouth nightly as needed for Sleep       ALLERGIES     Betadine [povidone iodine]; Celexa [citalopram hydrobromide]; Lasix [furosemide]; Macrobid [nitrofurantoin monohyd macro]; Macrobid [nitrofurantoin]; Other; Betadine [povidone iodine]; Codeine; Lithium; Paxil [paroxetine hcl]; and Tegretol [carbamazepine]    FAMILY HISTORY       Family History   Problem Relation Age of Onset    Depression Mother     High Blood Pressure Mother     High Cholesterol Mother     Diabetes Father     Heart Disease Father     Kidney Disease Father     High Blood Pressure Father     High Cholesterol Father      Family Status   Relation Status    Mother Alive    Father Alive      Reviewed and not relevant. SOCIAL HISTORY      reports that she has never smoked. She has never used smokeless tobacco. She reports that she does not drink alcohol or use drugs. Reviewed.    PHYSICAL EXAM    (up to 7 for level 4, 8 or more for level 5)     ED Triage Vitals [08/30/18 2116]   BP Temp Temp Source Pulse Resp SpO2 Height Weight   (!) 145/87 97.9 °F (36.6 °C) Oral 110 18 100 % 5' 9\" (1.753 m) (!) 305 lb (138.3 kg)       Physical Exam   Constitutional: She is oriented to person, place, and time. She appears well-developed and well-nourished. HENT:   Head: Normocephalic and atraumatic. Right Ear: External ear normal.   Left Ear: External ear normal.   Nose: Nose normal.   Mouth/Throat: Uvula is midline, oropharynx is clear and moist and mucous membranes are normal. No trismus in the jaw. Abnormal dentition. Dental caries present. No dental abscesses. Multiple dental caries and dental decay. No gingival swelling or erythema. No palpable dental abscess. No swelling involving airway or floor of the mouth. No Anthony's angina. No trismus. Eyes: Right eye exhibits no discharge. Left eye exhibits no discharge. No scleral icterus. Neck: Normal range of motion. Cardiovascular: Normal rate and regular rhythm. Pulmonary/Chest: Effort normal and breath sounds normal. No stridor. No respiratory distress. Musculoskeletal: Normal range of motion. She exhibits no edema. Neurological: She is alert and oriented to person, place, and time. Coordination normal.   Skin: Skin is warm and dry. She is not diaphoretic. Psychiatric: She has a normal mood and affect. Her behavior is normal.       DIAGNOSTIC RESULTS     RADIOLOGY:   none      LABS:  Labs Reviewed - No data to display    All other labs were within normal range or not returned as of this dictation. EMERGENCY DEPARTMENT COURSE and DIFFERENTIAL DIAGNOSIS/MDM:   Patient to ED for evaluation of dental pain. Has chronic dental pain. Awaiting oral surgery to have teeth extracted. Patient is asking for something for pain while in the emergency department. Antibiotics. Follow-up with dentist/dental clinic as soon as possible. Instructed to return for worsening or any new symptoms including throat swelling, difficulty swallowing or breathing. Pt agrees. After discharge, patient's discharge paperwork and antibiotic prescription was found by RN in the garbage can.    appears to be exhibiting drug-seeking behaviors. Vitals:    Vitals:    08/30/18 2116   BP: (!) 145/87   Pulse: 110   Resp: 18   Temp: 97.9 °F (36.6 °C)   TempSrc: Oral   SpO2: 100%   Weight: (!) 305 lb (138.3 kg)   Height: 5' 9\" (1.753 m)       Vitals reviewed. PROCEDURES:  None    FINAL IMPRESSION      1. Chronic dental pain    2. Dental infection    3.  Drug-seeking behavior          DISPOSITION/PLAN   DISPOSITION Decision To Discharge 08/30/2018 09:59:20 PM      PATIENT REFERRED TO:  Jennie Banks MD  [address] [place] [id number]    Schedule an appointment as soon as possible for a visit   Follow up visit    [facility] ED  [place] [id number] [phone]    If symptoms worsen      DISCHARGE MEDICATIONS:  New Prescriptions    PENICILLIN V POTASSIUM (VEETID) 500 MG TABLET    Take 1 tablet by mouth 4 times daily for 10 days       (Please note that portions of this note were completed with a voice recognition program.  Efforts were made to edit the dictations but occasionally words are mis-transcribed.)    Giovany Mills, BERNARDA - CNP  08/30/18 7808

## 2018-08-31 NOTE — ED NOTES
Pt script for Penicillin found in garbage can along with discharge instructions.      Dell Villanueva RN  08/30/18 4490

## 2018-09-09 ENCOUNTER — HOSPITAL ENCOUNTER (EMERGENCY)
Age: 38
Discharge: HOME | End: 2018-09-09
Attending: EMERGENCY MEDICINE
Payer: MEDICARE

## 2018-09-09 ENCOUNTER — APPOINTMENT (OUTPATIENT)
Dept: CT IMAGING | Age: 38
End: 2018-09-09
Payer: MEDICARE

## 2018-09-09 ENCOUNTER — APPOINTMENT (OUTPATIENT)
Dept: GENERAL RADIOLOGY | Age: 38
End: 2018-09-09
Payer: MEDICARE

## 2018-09-09 VITALS
HEART RATE: 114 BPM | DIASTOLIC BLOOD PRESSURE: 111 MMHG | SYSTOLIC BLOOD PRESSURE: 177 MMHG | TEMPERATURE: 98.4 F | BODY MASS INDEX: 44.41 KG/M2 | HEIGHT: 68 IN | RESPIRATION RATE: 19 BRPM | OXYGEN SATURATION: 100 % | WEIGHT: 293 LBS

## 2018-09-09 DIAGNOSIS — M79.661 RIGHT CALF PAIN: Primary | ICD-10-CM

## 2018-09-09 DIAGNOSIS — Z86.718 HISTORY OF DVT OF LOWER EXTREMITY: ICD-10-CM

## 2018-09-09 LAB
ABSOLUTE EOS #: 0.13 K/UL (ref 0–0.44)
ABSOLUTE IMMATURE GRANULOCYTE: 0.03 K/UL (ref 0–0.3)
ABSOLUTE LYMPH #: 1.62 K/UL (ref 1.1–3.7)
ABSOLUTE MONO #: 0.46 K/UL (ref 0.1–1.2)
ANION GAP SERPL CALCULATED.3IONS-SCNC: 13 MMOL/L (ref 9–17)
BASOPHILS # BLD: 1 % (ref 0–2)
BASOPHILS ABSOLUTE: 0.03 K/UL (ref 0–0.2)
BUN BLDV-MCNC: 12 MG/DL (ref 6–20)
BUN/CREAT BLD: ABNORMAL (ref 9–20)
CALCIUM SERPL-MCNC: 8.8 MG/DL (ref 8.6–10.4)
CHLORIDE BLD-SCNC: 97 MMOL/L (ref 98–107)
CO2: 24 MMOL/L (ref 20–31)
CREAT SERPL-MCNC: 0.45 MG/DL (ref 0.5–0.9)
DIFFERENTIAL TYPE: ABNORMAL
EKG ATRIAL RATE: 111 BPM
EKG P AXIS: 45 DEGREES
EKG P-R INTERVAL: 138 MS
EKG Q-T INTERVAL: 342 MS
EKG QRS DURATION: 92 MS
EKG QTC CALCULATION (BAZETT): 465 MS
EKG R AXIS: 30 DEGREES
EKG T AXIS: 19 DEGREES
EKG VENTRICULAR RATE: 111 BPM
EOSINOPHILS RELATIVE PERCENT: 2 % (ref 1–4)
GFR AFRICAN AMERICAN: >60 ML/MIN
GFR NON-AFRICAN AMERICAN: >60 ML/MIN
GFR SERPL CREATININE-BSD FRML MDRD: ABNORMAL ML/MIN/{1.73_M2}
GFR SERPL CREATININE-BSD FRML MDRD: ABNORMAL ML/MIN/{1.73_M2}
GLUCOSE BLD-MCNC: 310 MG/DL (ref 70–99)
HCT VFR BLD CALC: 40.6 % (ref 36.3–47.1)
HEMOGLOBIN: 13.1 G/DL (ref 11.9–15.1)
IMMATURE GRANULOCYTES: 1 %
LYMPHOCYTES # BLD: 28 % (ref 24–43)
MCH RBC QN AUTO: 29.3 PG (ref 25.2–33.5)
MCHC RBC AUTO-ENTMCNC: 32.3 G/DL (ref 28.4–34.8)
MCV RBC AUTO: 90.8 FL (ref 82.6–102.9)
MONOCYTES # BLD: 8 % (ref 3–12)
NRBC AUTOMATED: 0 PER 100 WBC
PDW BLD-RTO: 12.7 % (ref 11.8–14.4)
PLATELET # BLD: 226 K/UL (ref 138–453)
PLATELET ESTIMATE: ABNORMAL
PMV BLD AUTO: 12.8 FL (ref 8.1–13.5)
POC TROPONIN I: 0.01 NG/ML (ref 0–0.1)
POC TROPONIN I: 0.01 NG/ML (ref 0–0.1)
POC TROPONIN INTERP: NORMAL
POC TROPONIN INTERP: NORMAL
POTASSIUM SERPL-SCNC: 4.9 MMOL/L (ref 3.7–5.3)
RBC # BLD: 4.47 M/UL (ref 3.95–5.11)
RBC # BLD: ABNORMAL 10*6/UL
SEG NEUTROPHILS: 60 % (ref 36–65)
SEGMENTED NEUTROPHILS ABSOLUTE COUNT: 3.6 K/UL (ref 1.5–8.1)
SODIUM BLD-SCNC: 134 MMOL/L (ref 135–144)
WBC # BLD: 5.9 K/UL (ref 3.5–11.3)
WBC # BLD: ABNORMAL 10*3/UL

## 2018-09-09 PROCEDURE — 80048 BASIC METABOLIC PNL TOTAL CA: CPT

## 2018-09-09 PROCEDURE — 2580000003 HC RX 258: Performed by: EMERGENCY MEDICINE

## 2018-09-09 PROCEDURE — 71260 CT THORAX DX C+: CPT

## 2018-09-09 PROCEDURE — 6360000002 HC RX W HCPCS: Performed by: EMERGENCY MEDICINE

## 2018-09-09 PROCEDURE — 84484 ASSAY OF TROPONIN QUANT: CPT

## 2018-09-09 PROCEDURE — 96376 TX/PRO/DX INJ SAME DRUG ADON: CPT

## 2018-09-09 PROCEDURE — 6360000004 HC RX CONTRAST MEDICATION: Performed by: EMERGENCY MEDICINE

## 2018-09-09 PROCEDURE — 96375 TX/PRO/DX INJ NEW DRUG ADDON: CPT

## 2018-09-09 PROCEDURE — 96374 THER/PROPH/DIAG INJ IV PUSH: CPT

## 2018-09-09 PROCEDURE — 93005 ELECTROCARDIOGRAM TRACING: CPT

## 2018-09-09 PROCEDURE — 85025 COMPLETE CBC W/AUTO DIFF WBC: CPT

## 2018-09-09 PROCEDURE — 71046 X-RAY EXAM CHEST 2 VIEWS: CPT

## 2018-09-09 PROCEDURE — 99285 EMERGENCY DEPT VISIT HI MDM: CPT

## 2018-09-09 RX ORDER — MORPHINE SULFATE 4 MG/ML
4 INJECTION, SOLUTION INTRAMUSCULAR; INTRAVENOUS ONCE
Status: COMPLETED | OUTPATIENT
Start: 2018-09-09 | End: 2018-09-09

## 2018-09-09 RX ORDER — OXYCODONE HYDROCHLORIDE AND ACETAMINOPHEN 5; 325 MG/1; MG/1
1-2 TABLET ORAL EVERY 6 HOURS PRN
Qty: 6 TABLET | Refills: 0 | Status: SHIPPED | OUTPATIENT
Start: 2018-09-09 | End: 2018-09-14

## 2018-09-09 RX ORDER — FENTANYL CITRATE 50 UG/ML
50 INJECTION, SOLUTION INTRAMUSCULAR; INTRAVENOUS ONCE
Status: COMPLETED | OUTPATIENT
Start: 2018-09-09 | End: 2018-09-09

## 2018-09-09 RX ORDER — 0.9 % SODIUM CHLORIDE 0.9 %
1000 INTRAVENOUS SOLUTION INTRAVENOUS ONCE
Status: COMPLETED | OUTPATIENT
Start: 2018-09-09 | End: 2018-09-09

## 2018-09-09 RX ADMIN — IOPAMIDOL 75 ML: 755 INJECTION, SOLUTION INTRAVENOUS at 19:56

## 2018-09-09 RX ADMIN — FENTANYL CITRATE 50 MCG: 50 INJECTION INTRAMUSCULAR; INTRAVENOUS at 18:10

## 2018-09-09 RX ADMIN — SODIUM CHLORIDE 1000 ML: 9 INJECTION, SOLUTION INTRAVENOUS at 16:21

## 2018-09-09 RX ADMIN — MORPHINE SULFATE 4 MG: 4 INJECTION INTRAVENOUS at 16:21

## 2018-09-09 RX ADMIN — FENTANYL CITRATE 50 MCG: 50 INJECTION INTRAMUSCULAR; INTRAVENOUS at 20:19

## 2018-09-09 ASSESSMENT — ENCOUNTER SYMPTOMS
NAUSEA: 0
RHINORRHEA: 0
DIARRHEA: 0
EYE PAIN: 0
ABDOMINAL PAIN: 0
COUGH: 0
SHORTNESS OF BREATH: 1
CONSTIPATION: 0
VOMITING: 0

## 2018-09-09 ASSESSMENT — PAIN SCALES - GENERAL
PAINLEVEL_OUTOF10: 7
PAINLEVEL_OUTOF10: 8
PAINLEVEL_OUTOF10: 8

## 2018-09-09 ASSESSMENT — PAIN DESCRIPTION - LOCATION: LOCATION: CHEST

## 2018-09-09 ASSESSMENT — PAIN DESCRIPTION - FREQUENCY: FREQUENCY: CONTINUOUS

## 2018-09-09 ASSESSMENT — PAIN DESCRIPTION - ONSET: ONSET: SUDDEN

## 2018-09-09 ASSESSMENT — PAIN DESCRIPTION - DESCRIPTORS: DESCRIPTORS: PRESSURE

## 2018-09-09 ASSESSMENT — PAIN DESCRIPTION - ORIENTATION: ORIENTATION: INNER;MID

## 2018-09-09 NOTE — ED PROVIDER NOTES
[address]  ED  Emergency Department Encounter  Emergency Medicine Resident     Pt Name: Christiana Casey  MRN: [id number]  [place] 1980  Date of evaluation: 9/9/18  PCP:  Xin Ramos MD    [address] [place] [address]       Chief Complaint   Patient presents with    Chest Pain    Shortness of Breath       HISTORY OF PRESENT ILLNESS  (Location/Symptom, Timing/Onset, Context/Setting, Quality, Duration, Modifying Factors, Severity.)      Christiana Casey is a 43 y.o. female who presents with chief complaint of right leg pain and bruising. Patient with history of DVT and states this feels like her DVT in the past.  Patient is on fondaparinux. Patient denies any trauma or injury. Patient states this started on Thursday. Patient states that since then on Friday she started to heal anterior chest discomfort and shortness of breath. Patient states it feels like her heart is racing. It is patient denies any fevers, cough, nausea, or vomiting. Patient denies any numbness or tingling. Patient with extensive past medical history including antiphospholipid syndrome. PAST MEDICAL / SURGICAL / SOCIAL / FAMILY HISTORY      has a past medical history of Alcohol abuse; Anxiety and depression; Arthritis; Painting esophagus; Bipolar disorder, unspecified ([address] Broken rib; Depression; Diabetes mellitus ([address] Diabetes mellitus ([address] Embolism and thrombosis of unspecified site; Genital herpes, unspecified; GERD (gastroesophageal reflux disease); History of blood transfusion; History of hypertension; Hx of blood clots; Hypertension; Hypoglycemia, unspecified; Lumbosacral spondylosis without myelopathy; MDRO (multiple drug resistant organisms) resistance; Miscarriage; MRSA (methicillin resistant staph aureus) culture positive; Overdose of drug; Pernicious anemia; Primary hypercoagulable state ([address]  Suicide attempt [facility]); SVT (supraventricular tachycardia) ([address] and Type II or unspecified type diabetes mellitus without mention of complication, not stated as uncontrolled. has a past surgical history that includes Bariatric Surgery (2013); Cholecystectomy; Hysterectomy; Liver Resection; hernia repair; Tonsillectomy; Endoscopy, colon, diagnostic; Abdominal hernia repair (2014); Abdominal hernia repair (11/3/14); Bariatric Surgery (2013); Dilation and curettage of uterus (2005); and Finger amputation (Left, 02/09/2015). Social History     Social History    Marital status:      Spouse name: N/A    Number of children: N/A    Years of education: N/A     Occupational History    Not on file. Social History Main Topics    Smoking status: Never Smoker    Smokeless tobacco: Never Used    Alcohol use No      Comment: Last alcohol use was in 12/2015    Drug use: No      Comment: Pt stole her mom's Benzo 1 yr ago. Pt said she took more than prescribed dose of Valium once in late 90's.  Sexual activity: Not Currently     Other Topics Concern    Not on file     Social History Narrative    ** Merged History Encounter **            Family History   Problem Relation Age of Onset    Depression Mother     High Blood Pressure Mother     High Cholesterol Mother     Diabetes Father     Heart Disease Father     Kidney Disease Father     High Blood Pressure Father     High Cholesterol Father        Allergies:  Betadine [povidone iodine]; Celexa [citalopram hydrobromide]; Lasix [furosemide]; Macrobid [nitrofurantoin monohyd macro]; Macrobid [nitrofurantoin]; Other; Betadine [povidone iodine]; Codeine; Lithium; Paxil [paroxetine hcl]; and Tegretol [carbamazepine]    Home Medications:  Prior to Admission medications    Medication Sig Start Date End Date Taking? Authorizing Provider   oxyCODONE-acetaminophen (PERCOCET) 5-325 MG per tablet Take 1-2 tablets by mouth every 6 hours as needed for Pain for up to 5 days. . 9/9/18 9/14/18 Yes Garfield Barrow,    penicillin v potassium (VEETID) 500 MG tablet Take 1 tablet by mouth 4 times daily for 10 days 8/30/18 9/9/18  Huy Gonzales, APRN - CNP   penicillin v potassium (VEETID) 500 MG tablet Take 1 tablet by mouth 4 times daily 5/30/18   Caleb Anguiano PA-C   ibuprofen (ADVIL;MOTRIN) 600 MG tablet Take 1 tablet by mouth every 8 hours as needed for Pain 5/30/18   Caleb Anguiano PA-C   eszopiclone (ESZOPICLONE) 3 MG TABS Take 3 mg by mouth nightly . Historical Provider, MD   traMADol (ULTRAM) 50 MG tablet Take 1 tablet by mouth every 6 hours as needed for Pain .  12/14/17   Clement Alves MD   dicyclomine (BENTYL) 10 MG capsule Take 1 capsule by mouth 4 times daily (before meals and nightly) 10/22/17   Esther Uribe PA-C   acetaminophen (APAP EXTRA STRENGTH) 500 MG tablet Take 1 tablet by mouth every 4 hours as needed for Pain (Take when out of Norco or when the pain is less severe, do not take at the same time as both contain acetaminophen) 9/22/17   Jennifer Youngblood, DO   ondansetron (ZOFRAN) 4 MG tablet Take 1 tablet by mouth every 8 hours as needed for Nausea 9/11/17   Jennyfer Rajput, DO   ondansetron (ZOFRAN ODT) 4 MG disintegrating tablet Take 1 tablet by mouth every 8 hours as needed for Nausea 8/22/17   Moise Naqvi MD   tiZANidine (ZANAFLEX) 4 MG tablet Take 1 tablet by mouth every 6 hours as needed (pain/spasm) 7/2/17   Dez Cardona,    ondansetron (ZOFRAN ODT) 4 MG disintegrating tablet Take 1 tablet by mouth every 8 hours as needed for Nausea 6/2/17   Jessica Sagastume MD   zolpidem (AMBIEN) 5 MG tablet Take 5 mg by mouth nightly as needed for Sleep    Historical Provider, MD   LORazepam (ATIVAN) 1 MG tablet Take 1 mg by mouth 2 times daily as needed for Anxiety    Historical Provider, MD   LORazepam (ATIVAN) 2 MG tablet Take 2 mg by mouth nightly as needed for Anxiety    Historical Provider, MD   esomeprazole Magnesium (NEXIUM) 40 MG PACK Take 40 mg by mouth daily    Historical Provider, MD   diltiazem (CARDIZEM CD) 240 MG extended release

## 2018-09-09 NOTE — ED PROVIDER NOTES
[facility]     Emergency Department     Faculty Attestation    I performed a history and physical examination of the patient and discussed management with the resident. I reviewed the residents note and agree with the documented findings and plan of care. Any areas of disagreement are noted on the chart. I was personally present for the key portions of any procedures. I have documented in the chart those procedures where I was not present during the key portions. I have reviewed the emergency nurses triage note. I agree with the chief complaint, past medical history, past surgical history, allergies, medications, social and family history as documented unless otherwise noted below. For Physician Assistant/ Nurse Practitioner cases/documentation I have personally evaluated this patient and have completed at least one if not all key elements of the E/M (history, physical exam, and MDM). Additional findings are as noted. Chest clear,  Heart exam normal , Right calf pain and swelling. Bruising along the right lateral proximal calf , equal pulses both wrists , trachea midline. Abdomen is nontender without pulsatile mass or bruit. Chest pain does not radiate to the back , and the pain is not pleuritic. The patient describes pain as a pressure on the front of her chest.  Patient has history of DVT on blood thinners, history of Turtle[employer] filter. Patient appears comfortable in no distress, skin is warm and dry.     EKG Interpretation    Interpreted by me    Rhythm: normal sinus   Rate: 111  Axis: normal  Ectopy: none  Conduction: normal  ST Segments: no acute change  T Waves: no acute change  Q Waves: none    Clinical Impression: no acute changes and normal EKG Except for sinus tachycardia    Christine Elizalde MD [facility]  Attending Physician                 Enmanuel Pang MD  09/09/18 3207

## 2018-11-08 ENCOUNTER — HOSPITAL ENCOUNTER (EMERGENCY)
Age: 38
Discharge: HOME | End: 2018-11-08
Attending: EMERGENCY MEDICINE
Payer: MEDICARE

## 2018-11-08 ENCOUNTER — APPOINTMENT (OUTPATIENT)
Dept: CT IMAGING | Age: 38
End: 2018-11-08
Payer: MEDICARE

## 2018-11-08 VITALS
TEMPERATURE: 97.2 F | BODY MASS INDEX: 45.92 KG/M2 | DIASTOLIC BLOOD PRESSURE: 87 MMHG | RESPIRATION RATE: 16 BRPM | WEIGHT: 293 LBS | SYSTOLIC BLOOD PRESSURE: 143 MMHG | OXYGEN SATURATION: 98 % | HEART RATE: 80 BPM

## 2018-11-08 DIAGNOSIS — R10.84 GENERALIZED ABDOMINAL PAIN: ICD-10-CM

## 2018-11-08 DIAGNOSIS — L03.311 ABDOMINAL WALL CELLULITIS: Primary | ICD-10-CM

## 2018-11-08 LAB
ABSOLUTE EOS #: 0.16 K/UL (ref 0–0.44)
ABSOLUTE IMMATURE GRANULOCYTE: <0.03 K/UL (ref 0–0.3)
ABSOLUTE LYMPH #: 1.45 K/UL (ref 1.1–3.7)
ABSOLUTE MONO #: 0.38 K/UL (ref 0.1–1.2)
ALBUMIN SERPL-MCNC: 3.5 G/DL (ref 3.5–5.2)
ALBUMIN/GLOBULIN RATIO: 1.2 (ref 1–2.5)
ALP BLD-CCNC: 109 U/L (ref 35–104)
ALT SERPL-CCNC: 16 U/L (ref 5–33)
ANION GAP SERPL CALCULATED.3IONS-SCNC: 12 MMOL/L (ref 9–17)
AST SERPL-CCNC: 11 U/L
BASOPHILS # BLD: 1 % (ref 0–2)
BASOPHILS ABSOLUTE: 0.04 K/UL (ref 0–0.2)
BILIRUB SERPL-MCNC: <0.1 MG/DL (ref 0.3–1.2)
BUN BLDV-MCNC: 14 MG/DL (ref 6–20)
BUN/CREAT BLD: ABNORMAL (ref 9–20)
C-REACTIVE PROTEIN: 18.5 MG/L (ref 0–5)
CALCIUM SERPL-MCNC: 9.1 MG/DL (ref 8.6–10.4)
CHLORIDE BLD-SCNC: 102 MMOL/L (ref 98–107)
CO2: 22 MMOL/L (ref 20–31)
CREAT SERPL-MCNC: 0.55 MG/DL (ref 0.5–0.9)
DIFFERENTIAL TYPE: ABNORMAL
EOSINOPHILS RELATIVE PERCENT: 3 % (ref 1–4)
GFR AFRICAN AMERICAN: >60 ML/MIN
GFR NON-AFRICAN AMERICAN: >60 ML/MIN
GFR SERPL CREATININE-BSD FRML MDRD: ABNORMAL ML/MIN/{1.73_M2}
GFR SERPL CREATININE-BSD FRML MDRD: ABNORMAL ML/MIN/{1.73_M2}
GLUCOSE BLD-MCNC: 277 MG/DL (ref 70–99)
HCT VFR BLD CALC: 39.4 % (ref 36.3–47.1)
HEMOGLOBIN: 12.6 G/DL (ref 11.9–15.1)
IMMATURE GRANULOCYTES: 0 %
LIPASE: 17 U/L (ref 13–60)
LYMPHOCYTES # BLD: 23 % (ref 24–43)
MCH RBC QN AUTO: 30 PG (ref 25.2–33.5)
MCHC RBC AUTO-ENTMCNC: 32 G/DL (ref 28.4–34.8)
MCV RBC AUTO: 93.8 FL (ref 82.6–102.9)
MONOCYTES # BLD: 6 % (ref 3–12)
NRBC AUTOMATED: 0 PER 100 WBC
PDW BLD-RTO: 12.8 % (ref 11.8–14.4)
PLATELET # BLD: 251 K/UL (ref 138–453)
PLATELET ESTIMATE: ABNORMAL
PMV BLD AUTO: 12.1 FL (ref 8.1–13.5)
POTASSIUM SERPL-SCNC: 4.3 MMOL/L (ref 3.7–5.3)
RBC # BLD: 4.2 M/UL (ref 3.95–5.11)
RBC # BLD: ABNORMAL 10*6/UL
SEG NEUTROPHILS: 67 % (ref 36–65)
SEGMENTED NEUTROPHILS ABSOLUTE COUNT: 4.14 K/UL (ref 1.5–8.1)
SODIUM BLD-SCNC: 136 MMOL/L (ref 135–144)
TOTAL PROTEIN: 6.4 G/DL (ref 6.4–8.3)
WBC # BLD: 6.2 K/UL (ref 3.5–11.3)
WBC # BLD: ABNORMAL 10*3/UL

## 2018-11-08 PROCEDURE — 6360000002 HC RX W HCPCS: Performed by: STUDENT IN AN ORGANIZED HEALTH CARE EDUCATION/TRAINING PROGRAM

## 2018-11-08 PROCEDURE — 6360000004 HC RX CONTRAST MEDICATION: Performed by: EMERGENCY MEDICINE

## 2018-11-08 PROCEDURE — 83690 ASSAY OF LIPASE: CPT

## 2018-11-08 PROCEDURE — 74177 CT ABD & PELVIS W/CONTRAST: CPT

## 2018-11-08 PROCEDURE — 85025 COMPLETE CBC W/AUTO DIFF WBC: CPT

## 2018-11-08 PROCEDURE — 96374 THER/PROPH/DIAG INJ IV PUSH: CPT

## 2018-11-08 PROCEDURE — 99284 EMERGENCY DEPT VISIT MOD MDM: CPT

## 2018-11-08 PROCEDURE — 80053 COMPREHEN METABOLIC PANEL: CPT

## 2018-11-08 PROCEDURE — 86140 C-REACTIVE PROTEIN: CPT

## 2018-11-08 RX ORDER — ONDANSETRON 4 MG/1
4 TABLET, FILM COATED ORAL EVERY 8 HOURS PRN
Status: DISCONTINUED | OUTPATIENT
Start: 2018-11-08 | End: 2018-11-08 | Stop reason: HOSPADM

## 2018-11-08 RX ORDER — MORPHINE SULFATE 4 MG/ML
4 INJECTION, SOLUTION INTRAMUSCULAR; INTRAVENOUS ONCE
Status: COMPLETED | OUTPATIENT
Start: 2018-11-08 | End: 2018-11-08

## 2018-11-08 RX ORDER — RANITIDINE 150 MG/1
150 TABLET ORAL 2 TIMES DAILY
COMMUNITY
End: 2019-12-05 | Stop reason: ALTCHOICE

## 2018-11-08 RX ADMIN — ONDANSETRON HYDROCHLORIDE 4 MG: 4 TABLET, FILM COATED ORAL at 13:55

## 2018-11-08 RX ADMIN — MORPHINE SULFATE 4 MG: 4 INJECTION INTRAVENOUS at 13:58

## 2018-11-08 RX ADMIN — IOPAMIDOL 75 ML: 755 INJECTION, SOLUTION INTRAVENOUS at 15:42

## 2018-11-08 ASSESSMENT — PAIN DESCRIPTION - DESCRIPTORS: DESCRIPTORS: BURNING;POUNDING;PRESSURE

## 2018-11-08 ASSESSMENT — ENCOUNTER SYMPTOMS
NAUSEA: 1
VOMITING: 1
DIARRHEA: 0
SORE THROAT: 0
BLOOD IN STOOL: 0
ABDOMINAL PAIN: 1
CONSTIPATION: 0
WHEEZING: 0
COUGH: 0
SHORTNESS OF BREATH: 0

## 2018-11-08 ASSESSMENT — PAIN DESCRIPTION - LOCATION: LOCATION: ABDOMEN

## 2018-11-08 ASSESSMENT — PAIN DESCRIPTION - PAIN TYPE: TYPE: ACUTE PAIN

## 2018-11-08 ASSESSMENT — PAIN SCALES - GENERAL
PAINLEVEL_OUTOF10: 10
PAINLEVEL_OUTOF10: 8

## 2018-11-08 ASSESSMENT — PAIN DESCRIPTION - FREQUENCY: FREQUENCY: CONTINUOUS

## 2018-11-08 ASSESSMENT — PAIN DESCRIPTION - PROGRESSION: CLINICAL_PROGRESSION: GRADUALLY WORSENING

## 2018-11-08 ASSESSMENT — PAIN DESCRIPTION - ONSET: ONSET: GRADUAL

## 2018-11-08 NOTE — ED PROVIDER NOTES
[facility] ED  eMERGENCY dEPARTMENT eNCOUnter  Resident    Pt Name: Shama Ramos  MRN: [id number]  [place] 1980  Date of evaluation: 11/8/2018  PCP:  Chary Mathews MD    [address] [place] [address]       Chief Complaint   Patient presents with    Wound Check    Abdominal Pain     HISTORY OF PRESENT ILLNESS    Shama Ramos is a 43 y.o. female who presents for abdominal pain. The patient states that 3 days ago she notice a strand of mesh like material extruding from her abdomen and she pulled it. The patient states that she noticed a wound begin to proliferate soon after. The patient states her abdomen is tender to the touch. The patient relates nausea with non-bloody vomiting. The patient denies any f/c/SOB/CP. The patient has an extensive history of hernia repairs at [facility] most recently in 2013. The patient states she does not follow up with any surgeons. Of note, the patient was seen yesterday by Dr. Michela Tipton and diagnosed with abdominal wall cellulitis and given a prescription for Keflex for 7 days. REVIEW OF SYSTEMS       Review of Systems   Constitutional: Negative for chills, fatigue and fever. HENT: Negative for congestion and sore throat. Respiratory: Negative for cough, shortness of breath and wheezing. Cardiovascular: Negative for chest pain and leg swelling. Gastrointestinal: Positive for abdominal pain, nausea and vomiting. Negative for blood in stool, constipation and diarrhea. Endocrine: Negative for cold intolerance and heat intolerance. Genitourinary: Negative for difficulty urinating and dysuria. Musculoskeletal: Negative for arthralgias, myalgias and neck stiffness. Skin: Negative for rash and wound. Neurological: Negative for dizziness, weakness, numbness and headaches. Psychiatric/Behavioral: Negative for agitation and behavioral problems. PAST MEDICAL HISTORY    has a past medical history of Alcohol abuse; Anxiety and depression;  Arthritis; obstruction or inflammation. No free intraperitoneal air or fluid. Status post cholecystectomy. Stable hypodense and peripherally calcified   lesion within the left hepatic lobe anteriorly. Status post gastric bypass. LABS:  Labs Reviewed   C-REACTIVE PROTEIN - Abnormal; Notable for the following:        Result Value    CRP 18.5 (*)     All other components within normal limits   CBC WITH AUTO DIFFERENTIAL - Abnormal; Notable for the following:     Seg Neutrophils 67 (*)     Lymphocytes 23 (*)     All other components within normal limits   COMPREHENSIVE METABOLIC PANEL - Abnormal; Notable for the following:     Glucose 277 (*)     Alkaline Phosphatase 109 (*)     Total Bilirubin <0.10 (*)     All other components within normal limits   LIPASE       EMERGENCY DEPARTMENT COURSE:   Vitals:    Vitals:    11/08/18 1222   BP: (!) 143/87   Pulse: 80   Resp: 16   Temp: 97.2 °F (36.2 °C)   TempSrc: Oral   SpO2: 98%   Weight: (!) 302 lb (137 kg)     Patient presents with generalized abdominal pain. Patient states mesh like material was extruding from her abdomen 3 days ago and she pulled it and a subsequent wound formed on her abdomen. Extensive history of multiple abdominal hernia repairs at [facility]. The patient presented to her PCP who prescribed her 7 days keflex and advised her to go to the ED should symptoms worsen. Abdomen CT shows no evidence of acute intra-abdominal or intrapelvic pathology, no bowel obstruction or inflammation. WBC 6.2, CRP 18.5, Lipase 17. Will add bactrim 10 days, continue taking keflex as previously prescribed. CRITICAL CARE:  None    CONSULTS:  None      PROCEDURES:  Procedures      FINAL IMPRESSION      1. Abdominal wall cellulitis    2.  Generalized abdominal pain          DISPOSITION/PLAN   DISPOSITION      Discharge home    PATIENT REFERRED TO:  Miranda Liz MD  [address] [place] [address]  [phone]    Schedule an appointment as soon as possible

## 2018-11-08 NOTE — ED PROVIDER NOTES
Emergency Medicine Attending Note    I have seen and examined the patient in conjunction with the Resident/MLP. Please see my key portion documented:      I agree with the assessment and plan as discussed with Dr. Joselito Blandon. Electronically signed:  FATMATA Keyes MD  11/08/18 1552

## 2018-11-08 NOTE — ED NOTES
Attempted IV -unable to place will request Valerio Barth EMT-p to attempt. Pt and  updated.       Eden Jett, RN  11/08/18 7213

## 2018-11-08 NOTE — ED NOTES
Report to [facility]. Pt resting awaiting CT, CT updated.        Cameron Garcia RN  11/08/18 5886

## 2018-11-17 ENCOUNTER — HOSPITAL ENCOUNTER (INPATIENT)
Age: 38
LOS: 4 days | Discharge: HOME HEALTH | DRG: 863 | End: 2018-11-21
Attending: EMERGENCY MEDICINE | Admitting: EMERGENCY MEDICINE
Payer: MEDICARE

## 2018-11-17 DIAGNOSIS — L03.311 CELLULITIS OF ABDOMINAL WALL: Primary | ICD-10-CM

## 2018-11-17 PROBLEM — L03.90 CELLULITIS: Status: ACTIVE | Noted: 2018-11-17

## 2018-11-17 LAB
ABSOLUTE EOS #: 0.33 K/UL (ref 0–0.4)
ABSOLUTE IMMATURE GRANULOCYTE: 0 K/UL (ref 0–0.3)
ABSOLUTE LYMPH #: 1.85 K/UL (ref 1–4.8)
ABSOLUTE MONO #: 0.53 K/UL (ref 0.1–0.8)
ANION GAP SERPL CALCULATED.3IONS-SCNC: 10 MMOL/L (ref 9–17)
BASOPHILS # BLD: 0 % (ref 0–2)
BASOPHILS ABSOLUTE: 0 K/UL (ref 0–0.2)
BUN BLDV-MCNC: 13 MG/DL (ref 6–20)
BUN/CREAT BLD: ABNORMAL (ref 9–20)
CALCIUM SERPL-MCNC: 8.9 MG/DL (ref 8.6–10.4)
CHLORIDE BLD-SCNC: 98 MMOL/L (ref 98–107)
CO2: 29 MMOL/L (ref 20–31)
CREAT SERPL-MCNC: 0.73 MG/DL (ref 0.5–0.9)
DIFFERENTIAL TYPE: ABNORMAL
EOSINOPHILS RELATIVE PERCENT: 5 % (ref 1–4)
GFR AFRICAN AMERICAN: >60 ML/MIN
GFR NON-AFRICAN AMERICAN: >60 ML/MIN
GFR SERPL CREATININE-BSD FRML MDRD: ABNORMAL ML/MIN/{1.73_M2}
GFR SERPL CREATININE-BSD FRML MDRD: ABNORMAL ML/MIN/{1.73_M2}
GLUCOSE BLD-MCNC: 134 MG/DL (ref 65–105)
GLUCOSE BLD-MCNC: 145 MG/DL (ref 65–105)
GLUCOSE BLD-MCNC: 156 MG/DL (ref 65–105)
GLUCOSE BLD-MCNC: 251 MG/DL (ref 70–99)
HCT VFR BLD CALC: 41 % (ref 36.3–47.1)
HEMOGLOBIN: 13.3 G/DL (ref 11.9–15.1)
IMMATURE GRANULOCYTES: 0 %
LYMPHOCYTES # BLD: 28 % (ref 24–44)
MCH RBC QN AUTO: 30.2 PG (ref 25.2–33.5)
MCHC RBC AUTO-ENTMCNC: 32.4 G/DL (ref 28.4–34.8)
MCV RBC AUTO: 93.2 FL (ref 82.6–102.9)
MONOCYTES # BLD: 8 % (ref 1–7)
MORPHOLOGY: NORMAL
NRBC AUTOMATED: 0 PER 100 WBC
PDW BLD-RTO: 12.6 % (ref 11.8–14.4)
PLATELET # BLD: 246 K/UL (ref 138–453)
PLATELET ESTIMATE: ABNORMAL
PMV BLD AUTO: 11.6 FL (ref 8.1–13.5)
POTASSIUM SERPL-SCNC: 4.5 MMOL/L (ref 3.7–5.3)
RBC # BLD: 4.4 M/UL (ref 3.95–5.11)
RBC # BLD: ABNORMAL 10*6/UL
SEG NEUTROPHILS: 59 % (ref 36–66)
SEGMENTED NEUTROPHILS ABSOLUTE COUNT: 3.89 K/UL (ref 1.8–7.7)
SODIUM BLD-SCNC: 137 MMOL/L (ref 135–144)
WBC # BLD: 6.6 K/UL (ref 3.5–11.3)
WBC # BLD: ABNORMAL 10*3/UL

## 2018-11-17 PROCEDURE — 6370000000 HC RX 637 (ALT 250 FOR IP): Performed by: STUDENT IN AN ORGANIZED HEALTH CARE EDUCATION/TRAINING PROGRAM

## 2018-11-17 PROCEDURE — 99284 EMERGENCY DEPT VISIT MOD MDM: CPT

## 2018-11-17 PROCEDURE — 87040 BLOOD CULTURE FOR BACTERIA: CPT

## 2018-11-17 PROCEDURE — 96374 THER/PROPH/DIAG INJ IV PUSH: CPT

## 2018-11-17 PROCEDURE — 6360000002 HC RX W HCPCS: Performed by: STUDENT IN AN ORGANIZED HEALTH CARE EDUCATION/TRAINING PROGRAM

## 2018-11-17 PROCEDURE — 99222 1ST HOSP IP/OBS MODERATE 55: CPT | Performed by: INTERNAL MEDICINE

## 2018-11-17 PROCEDURE — 96375 TX/PRO/DX INJ NEW DRUG ADDON: CPT

## 2018-11-17 PROCEDURE — 2580000003 HC RX 258: Performed by: STUDENT IN AN ORGANIZED HEALTH CARE EDUCATION/TRAINING PROGRAM

## 2018-11-17 PROCEDURE — 82947 ASSAY GLUCOSE BLOOD QUANT: CPT

## 2018-11-17 PROCEDURE — 36415 COLL VENOUS BLD VENIPUNCTURE: CPT

## 2018-11-17 PROCEDURE — 85025 COMPLETE CBC W/AUTO DIFF WBC: CPT

## 2018-11-17 PROCEDURE — 80048 BASIC METABOLIC PNL TOTAL CA: CPT

## 2018-11-17 PROCEDURE — 1200000000 HC SEMI PRIVATE

## 2018-11-17 RX ORDER — FONDAPARINUX SODIUM 5 MG/.4ML
10 INJECTION SUBCUTANEOUS DAILY
Status: DISCONTINUED | OUTPATIENT
Start: 2018-11-17 | End: 2018-11-21 | Stop reason: HOSPADM

## 2018-11-17 RX ORDER — MORPHINE SULFATE 4 MG/ML
4 INJECTION, SOLUTION INTRAMUSCULAR; INTRAVENOUS ONCE
Status: COMPLETED | OUTPATIENT
Start: 2018-11-17 | End: 2018-11-17

## 2018-11-17 RX ORDER — PROMETHAZINE HYDROCHLORIDE 25 MG/ML
12.5 INJECTION, SOLUTION INTRAMUSCULAR; INTRAVENOUS ONCE
Status: COMPLETED | OUTPATIENT
Start: 2018-11-17 | End: 2018-11-17

## 2018-11-17 RX ORDER — RANITIDINE 150 MG/1
150 TABLET ORAL 2 TIMES DAILY
Status: DISCONTINUED | OUTPATIENT
Start: 2018-11-17 | End: 2018-11-21 | Stop reason: HOSPADM

## 2018-11-17 RX ORDER — LORAZEPAM 1 MG/1
1 TABLET ORAL 2 TIMES DAILY PRN
Status: DISCONTINUED | OUTPATIENT
Start: 2018-11-17 | End: 2018-11-21 | Stop reason: HOSPADM

## 2018-11-17 RX ORDER — DILTIAZEM HYDROCHLORIDE 240 MG/1
240 CAPSULE, COATED, EXTENDED RELEASE ORAL DAILY
Status: DISCONTINUED | OUTPATIENT
Start: 2018-11-17 | End: 2018-11-21 | Stop reason: HOSPADM

## 2018-11-17 RX ORDER — ONDANSETRON 4 MG/1
4 TABLET, ORALLY DISINTEGRATING ORAL EVERY 8 HOURS PRN
Status: DISCONTINUED | OUTPATIENT
Start: 2018-11-17 | End: 2018-11-21 | Stop reason: HOSPADM

## 2018-11-17 RX ORDER — SODIUM CHLORIDE 9 MG/ML
INJECTION, SOLUTION INTRAVENOUS CONTINUOUS
Status: DISCONTINUED | OUTPATIENT
Start: 2018-11-17 | End: 2018-11-21 | Stop reason: HOSPADM

## 2018-11-17 RX ORDER — MORPHINE SULFATE 10 MG/ML
10 INJECTION, SOLUTION INTRAMUSCULAR; INTRAVENOUS EVERY 4 HOURS PRN
Status: DISCONTINUED | OUTPATIENT
Start: 2018-11-17 | End: 2018-11-17

## 2018-11-17 RX ORDER — LORAZEPAM 1 MG/1
2 TABLET ORAL NIGHTLY PRN
Status: DISCONTINUED | OUTPATIENT
Start: 2018-11-17 | End: 2018-11-21 | Stop reason: HOSPADM

## 2018-11-17 RX ORDER — DEXTROSE MONOHYDRATE 50 MG/ML
100 INJECTION, SOLUTION INTRAVENOUS PRN
Status: DISCONTINUED | OUTPATIENT
Start: 2018-11-17 | End: 2018-11-21 | Stop reason: HOSPADM

## 2018-11-17 RX ORDER — DEXTROSE MONOHYDRATE 25 G/50ML
12.5 INJECTION, SOLUTION INTRAVENOUS PRN
Status: DISCONTINUED | OUTPATIENT
Start: 2018-11-17 | End: 2018-11-21 | Stop reason: HOSPADM

## 2018-11-17 RX ORDER — OXYCODONE HYDROCHLORIDE 5 MG/1
5 TABLET ORAL EVERY 4 HOURS PRN
Status: DISCONTINUED | OUTPATIENT
Start: 2018-11-17 | End: 2018-11-21 | Stop reason: HOSPADM

## 2018-11-17 RX ORDER — NICOTINE POLACRILEX 4 MG
15 LOZENGE BUCCAL PRN
Status: DISCONTINUED | OUTPATIENT
Start: 2018-11-17 | End: 2018-11-21 | Stop reason: HOSPADM

## 2018-11-17 RX ORDER — DULOXETIN HYDROCHLORIDE 30 MG/1
60 CAPSULE, DELAYED RELEASE ORAL DAILY
Status: DISCONTINUED | OUTPATIENT
Start: 2018-11-17 | End: 2018-11-21 | Stop reason: HOSPADM

## 2018-11-17 RX ORDER — ZOLPIDEM TARTRATE 5 MG/1
5 TABLET ORAL NIGHTLY PRN
Status: DISCONTINUED | OUTPATIENT
Start: 2018-11-17 | End: 2018-11-21 | Stop reason: HOSPADM

## 2018-11-17 RX ORDER — PENICILLIN V POTASSIUM 250 MG/1
500 TABLET ORAL 4 TIMES DAILY
Status: DISCONTINUED | OUTPATIENT
Start: 2018-11-17 | End: 2018-11-19

## 2018-11-17 RX ORDER — ACETAMINOPHEN 325 MG/1
650 TABLET ORAL ONCE
Status: COMPLETED | OUTPATIENT
Start: 2018-11-17 | End: 2018-11-17

## 2018-11-17 RX ORDER — LISINOPRIL 5 MG/1
5 TABLET ORAL
Status: DISCONTINUED | OUTPATIENT
Start: 2018-11-17 | End: 2018-11-21 | Stop reason: HOSPADM

## 2018-11-17 RX ORDER — SODIUM CHLORIDE 0.9 % (FLUSH) 0.9 %
10 SYRINGE (ML) INJECTION PRN
Status: DISCONTINUED | OUTPATIENT
Start: 2018-11-17 | End: 2018-11-21 | Stop reason: HOSPADM

## 2018-11-17 RX ORDER — ACETAMINOPHEN 500 MG
500 TABLET ORAL EVERY 4 HOURS PRN
Status: DISCONTINUED | OUTPATIENT
Start: 2018-11-17 | End: 2018-11-21 | Stop reason: HOSPADM

## 2018-11-17 RX ORDER — DIPHENHYDRAMINE HYDROCHLORIDE 50 MG/ML
25 INJECTION INTRAMUSCULAR; INTRAVENOUS ONCE
Status: COMPLETED | OUTPATIENT
Start: 2018-11-17 | End: 2018-11-17

## 2018-11-17 RX ORDER — MORPHINE SULFATE 2 MG/ML
10 INJECTION, SOLUTION INTRAMUSCULAR; INTRAVENOUS EVERY 4 HOURS PRN
Status: DISCONTINUED | OUTPATIENT
Start: 2018-11-17 | End: 2018-11-17

## 2018-11-17 RX ORDER — MORPHINE SULFATE 2 MG/ML
10 INJECTION, SOLUTION INTRAMUSCULAR; INTRAVENOUS ONCE
Status: COMPLETED | OUTPATIENT
Start: 2018-11-17 | End: 2018-11-17

## 2018-11-17 RX ORDER — SODIUM CHLORIDE 0.9 % (FLUSH) 0.9 %
10 SYRINGE (ML) INJECTION EVERY 12 HOURS SCHEDULED
Status: DISCONTINUED | OUTPATIENT
Start: 2018-11-17 | End: 2018-11-21 | Stop reason: HOSPADM

## 2018-11-17 RX ADMIN — OXYCODONE HYDROCHLORIDE 5 MG: 5 TABLET ORAL at 06:35

## 2018-11-17 RX ADMIN — INSULIN LISPRO 1 UNITS: 100 INJECTION, SOLUTION INTRAVENOUS; SUBCUTANEOUS at 18:31

## 2018-11-17 RX ADMIN — MORPHINE SULFATE 4 MG: 4 INJECTION INTRAVENOUS at 03:42

## 2018-11-17 RX ADMIN — DIPHENHYDRAMINE HYDROCHLORIDE 25 MG: 50 INJECTION INTRAMUSCULAR; INTRAVENOUS at 14:04

## 2018-11-17 RX ADMIN — ONDANSETRON 4 MG: 4 TABLET, ORALLY DISINTEGRATING ORAL at 11:07

## 2018-11-17 RX ADMIN — OXYCODONE HYDROCHLORIDE 5 MG: 5 TABLET ORAL at 11:07

## 2018-11-17 RX ADMIN — PENICILLIN V POTASSIUM 500 MG: 250 TABLET ORAL at 09:21

## 2018-11-17 RX ADMIN — VANCOMYCIN HYDROCHLORIDE 2000 MG: 1 INJECTION, POWDER, LYOPHILIZED, FOR SOLUTION INTRAVENOUS at 01:36

## 2018-11-17 RX ADMIN — PROMETHAZINE HYDROCHLORIDE 12.5 MG: 25 INJECTION INTRAMUSCULAR; INTRAVENOUS at 14:01

## 2018-11-17 RX ADMIN — MORPHINE SULFATE 10 MG: 2 INJECTION, SOLUTION INTRAMUSCULAR; INTRAVENOUS at 14:48

## 2018-11-17 RX ADMIN — MAGNESIUM GLUCONATE 500 MG ORAL TABLET 400 MG: 500 TABLET ORAL at 09:21

## 2018-11-17 RX ADMIN — PENICILLIN V POTASSIUM 500 MG: 250 TABLET ORAL at 17:53

## 2018-11-17 RX ADMIN — RANITIDINE 150 MG: 150 TABLET ORAL at 09:21

## 2018-11-17 RX ADMIN — PENICILLIN V POTASSIUM 500 MG: 250 TABLET ORAL at 14:07

## 2018-11-17 RX ADMIN — ZOLPIDEM TARTRATE 5 MG: 5 TABLET ORAL at 21:38

## 2018-11-17 RX ADMIN — MAGNESIUM GLUCONATE 500 MG ORAL TABLET 400 MG: 500 TABLET ORAL at 21:15

## 2018-11-17 RX ADMIN — OXYCODONE HYDROCHLORIDE 5 MG: 5 TABLET ORAL at 21:10

## 2018-11-17 RX ADMIN — DULOXETINE HYDROCHLORIDE 60 MG: 30 CAPSULE, DELAYED RELEASE ORAL at 09:21

## 2018-11-17 RX ADMIN — LISINOPRIL 5 MG: 5 TABLET ORAL at 11:12

## 2018-11-17 RX ADMIN — RANITIDINE 150 MG: 150 TABLET ORAL at 21:16

## 2018-11-17 RX ADMIN — PENICILLIN V POTASSIUM 500 MG: 250 TABLET ORAL at 21:16

## 2018-11-17 RX ADMIN — FONDAPARINUX SODIUM 10 MG: 5 INJECTION, SOLUTION SUBCUTANEOUS at 09:22

## 2018-11-17 RX ADMIN — SODIUM CHLORIDE: 9 INJECTION, SOLUTION INTRAVENOUS at 04:46

## 2018-11-17 RX ADMIN — PROMETHAZINE HYDROCHLORIDE 12.5 MG: 25 INJECTION INTRAMUSCULAR; INTRAVENOUS at 03:42

## 2018-11-17 RX ADMIN — INSULIN LISPRO 1 UNITS: 100 INJECTION, SOLUTION INTRAVENOUS; SUBCUTANEOUS at 21:38

## 2018-11-17 RX ADMIN — INSULIN LISPRO 5 UNITS: 100 INJECTION, SOLUTION INTRAVENOUS; SUBCUTANEOUS at 09:26

## 2018-11-17 RX ADMIN — LORAZEPAM 2 MG: 1 TABLET ORAL at 21:38

## 2018-11-17 RX ADMIN — DILTIAZEM HYDROCHLORIDE 240 MG: 240 CAPSULE, COATED, EXTENDED RELEASE ORAL at 09:21

## 2018-11-17 RX ADMIN — ACETAMINOPHEN 650 MG: 325 TABLET ORAL at 03:00

## 2018-11-17 RX ADMIN — LORAZEPAM 1 MG: 1 TABLET ORAL at 11:07

## 2018-11-17 ASSESSMENT — ENCOUNTER SYMPTOMS
VOMITING: 0
DIARRHEA: 0
WHEEZING: 0
SORE THROAT: 0
NAUSEA: 1
COUGH: 0
NAUSEA: 0
BLOOD IN STOOL: 0
ABDOMINAL PAIN: 1
SHORTNESS OF BREATH: 0
CONSTIPATION: 0

## 2018-11-17 ASSESSMENT — PAIN DESCRIPTION - PROGRESSION
CLINICAL_PROGRESSION: NOT CHANGED
CLINICAL_PROGRESSION: NOT CHANGED

## 2018-11-17 ASSESSMENT — PAIN SCALES - GENERAL
PAINLEVEL_OUTOF10: 8
PAINLEVEL_OUTOF10: 7
PAINLEVEL_OUTOF10: 8
PAINLEVEL_OUTOF10: 7
PAINLEVEL_OUTOF10: 8

## 2018-11-17 ASSESSMENT — PAIN DESCRIPTION - PAIN TYPE
TYPE: ACUTE PAIN
TYPE: ACUTE PAIN

## 2018-11-17 ASSESSMENT — PAIN DESCRIPTION - LOCATION
LOCATION: ABDOMEN
LOCATION: ABDOMEN

## 2018-11-17 ASSESSMENT — PAIN DESCRIPTION - DESCRIPTORS: DESCRIPTORS: SHARP;BURNING

## 2018-11-17 ASSESSMENT — PAIN DESCRIPTION - FREQUENCY: FREQUENCY: CONTINUOUS

## 2018-11-17 ASSESSMENT — PAIN DESCRIPTION - ONSET: ONSET: ON-GOING

## 2018-11-17 NOTE — ED PROVIDER NOTES
Overdose of drug; Pernicious anemia; Primary hypercoagulable state ([facility] [address] Suicide attempt [facility]); SVT (supraventricular tachycardia) ([facility] [address] and Type II or unspecified type diabetes mellitus without mention of complication, not stated as uncontrolled. has a past surgical history that includes Bariatric Surgery (2013); Cholecystectomy; Hysterectomy; Liver Resection; hernia repair; Tonsillectomy; Endoscopy, colon, diagnostic; Abdominal hernia repair (2014); Abdominal hernia repair (11/3/14); Bariatric Surgery (2013); Dilation and curettage of uterus (2005); and Finger amputation (Left, 02/09/2015). Social History     Social History    Marital status:      Spouse name: N/A    Number of children: N/A    Years of education: N/A     Occupational History    Not on file. Social History Main Topics    Smoking status: Never Smoker    Smokeless tobacco: Never Used    Alcohol use No      Comment: Last alcohol use was in 12/2015    Drug use: No      Comment: Pt stole her mom's Benzo 1 yr ago. Pt said she took more than prescribed dose of Valium once in late 90's.  Sexual activity: Not Currently     Other Topics Concern    Not on file     Social History Narrative    ** Merged History Encounter **            Family History   Problem Relation Age of Onset    Depression Mother     High Blood Pressure Mother     High Cholesterol Mother     Diabetes Father     Heart Disease Father     Kidney Disease Father     High Blood Pressure Father     High Cholesterol Father         Allergies:  Betadine [povidone iodine]; Celexa [citalopram hydrobromide]; Lasix [furosemide]; Macrobid [nitrofurantoin monohyd macro]; Macrobid [nitrofurantoin]; Other; Betadine [povidone iodine]; Codeine; Lithium; Paxil [paroxetine hcl]; and Tegretol [carbamazepine]    Home Medications:  Prior to Admission medications    Medication Sig Start Date End Date Taking?  Authorizing Provider   Insulin Glargine (TOUJEO SOLOSTAR SC) (FREESTYLE) monitoring kit 1 kit by Does not apply route daily as needed 12/1/15   Guerline Partida MD   DULoxetine HCl 40 MG CPEP Take 40 mg by mouth daily Do not crush or break. May add contents of capsule to apple juice or apple sauce, but not chocolate. Patient not taking: Reported on 4/26/2017 9/29/15 10/29/15  Tavon Herrera MD       REVIEW OFSYSTEMS    (2-9 systems for level 4, 10 or more for level 5)      Review of Systems   Constitutional: Positive for fever. Negative for chills. HENT: Negative. Respiratory: Negative for cough, shortness of breath and wheezing. Cardiovascular: Negative for chest pain, palpitations and leg swelling. Gastrointestinal: Positive for abdominal pain and nausea. Negative for blood in stool and vomiting. Genitourinary: Negative for dysuria and hematuria. Positive for history of hysterectomy. Musculoskeletal: Negative. Skin: Positive for wound. Allergic/Immunologic: Negative for environmental allergies. Neurological: Negative for weakness, light-headedness and numbness. PHYSICAL EXAM   (up to 7 for level 4, 8 or more forlevel 5)      INITIAL VITALS:   ED Triage Vitals   BP Temp Temp src Pulse Resp SpO2 Height Weight   -- -- -- -- -- -- -- --       Physical Exam   Constitutional: She is oriented to person, place, and time. She appears well-developed and well-nourished. No distress. HENT:   Head: Normocephalic and atraumatic. Cardiovascular: Regular rhythm and normal heart sounds. Exam reveals no gallop and no friction rub. No murmur heard. Tachycardic rate which appears to be patient's baseline. Pulmonary/Chest: Effort normal and breath sounds normal. No respiratory distress. She has no wheezes. She has no rales. Abdominal: Soft. She exhibits no distension and no mass. There is no tenderness. There is no guarding. Periumbilical wound with purulent material, see medial below. Scars consistent with past abdominal mesh surgeries. Musculoskeletal: She exhibits no edema or tenderness. Neurological: She is alert and oriented to person, place, and time. Skin:   No surrounding erythema around abdominal wound. Nursing note and vitals reviewed. DIFFERENTIAL  DIAGNOSIS     PLAN (LABS / IMAGING / EKG):  Orders Placed This Encounter   Procedures    CBC Auto Differential    Basic Metabolic Panel    PATIENT STATUS (FROM ED OR OR/PROCEDURAL) Observation       MEDICATIONS ORDERED:  Orders Placed This Encounter   Medications    vancomycin (VANCOCIN) 2,000 mg in dextrose 5 % 500 mL IVPB    acetaminophen (TYLENOL) tablet 650 mg       DDX: Cellulitis, sepsis, wound infection    Initial MDM/Plan/ED course: 43 y.o. female who presents with An abdominal wound. Patient has been being treated with Keflex and Bactrim for the past 9 days for abdominal cellulitis/abdominal wound. Patient complaining of continued pain, fevers, and discharge from wound. Patient was seen in this ED 9 days ago and had an abdominal CT scan which showed no acute abnormalities. Patient is tachycardic on presentation however it appears this is patient's baseline, she is afebrile here. Wound with some purulent material as well as pink granulation tissue around the edges. We'll obtain CBC and BMP and/or septic workup if patient has elevated white count. Plan for admission to observation with infectious disease consult and IV vancomycin treatment. Lab work unremarkable. Patient agrees with admission to observation with IV vancomycin and consult to infectious disease. DIAGNOSTIC RESULTS / EMERGENCY DEPARTMENT COURSE / MDM     LABS:  Labs Reviewed   BASIC METABOLIC PANEL - Abnormal; Notable for the following:        Result Value    Glucose 251 (*)     All other components within normal limits   CBC WITH AUTO DIFFERENTIAL         RADIOLOGY:  No results found.       EKG      All EKG's are interpreted by the [facility] Physician who either signs or

## 2018-11-17 NOTE — ED PROVIDER NOTES
[facility]     Emergency Department     Faculty Attestation    I performed a history and physical examination of the patient and discussed management with the resident. I reviewed the residents note and agree with the documented findings including all diagnostic interpretations and plan of care. Any areas of disagreement are noted on the chart. I was personally present for the key portions of any procedures. I have documented in the chart those procedures where I was not present during the key portions. I have reviewed the emergency nurses triage note. I agree with the chief complaint, past medical history, past surgical history, allergies, medications, social and family history as documented unless otherwise noted below. Documentation of the HPI, Physical Exam and Medical Decision Making performed by scribkarine is based on my personal performance of the HPI, PE and MDM. For Physician Assistant/ Nurse Practitioner cases/documentation I have personally evaluated this patient and have completed at least one if not all key elements of the E/M (history, physical exam, and MDM). Additional findings are as noted. Primary Care Physician: Delisa Gonzalez MD    History: This is a 43 y.o. female who presents to the Emergency Department with complaint of abdominal wall drainage and pain. History of chronic wound to the abdominal wall, now having thick gray black drainage. No fevers. Increase pain. Did have CT recently in the past month. Has a history of abdominal wall mesh with erosion, does not follow with the surgeon currently. Has been on Keflex and Bactrim at home. Physical:     weight is 303 lb 12.7 oz (137.8 kg) (abnormal). Her temperature is 97.7 °F (36.5 °C). Her blood pressure is 160/96 (abnormal) and her pulse is 111. Her respiration is 16 and oxygen saturation is 100%.     43 y.o. female uncomfortable but no acute distress, cardiac exam tachycardic

## 2018-11-17 NOTE — CONSULTS
Suicide attempt ([facility] [address]     hx OD on painkillers and rubbing alcohol    SVT (supraventricular tachycardia) ([facility] [address] 04/07/2017    Type II or unspecified type diabetes mellitus without mention of complication, not stated as uncontrolled     taking metformin       Past Surgical  History:     Past Surgical History:   Procedure Laterality Date    ABDOMINAL HERNIA REPAIR  2014    wound vac    ABDOMINAL HERNIA REPAIR  11/3/14    BARIATRIC SURGERY  2013    BARIATRIC SURGERY  2013    CHOLECYSTECTOMY      DILATION AND CURETTAGE OF UTERUS  2005    ENDOSCOPY, COLON, DIAGNOSTIC      EGD    FINGER AMPUTATION Left 02/09/2015    index and ring finger    HERNIA REPAIR      total of 8    HYSTERECTOMY      LIVER RESECTION      for hepatic adenoma    TONSILLECTOMY         Medications:      fondaparinux  10 mg Subcutaneous Daily    diltiazem  240 mg Oral Daily    DULoxetine  60 mg Oral Daily    lisinopril  5 mg Oral Lunch    magnesium oxide  400 mg Oral BID    penicillin v potassium  500 mg Oral 4x Daily    insulin glargine  55 Units Subcutaneous Nightly    ranitidine  150 mg Oral BID    sodium chloride flush  10 mL Intravenous 2 times per day    insulin lispro  0-6 Units Subcutaneous TID WC    insulin lispro  0-3 Units Subcutaneous Nightly    morphine  10 mg Intravenous Once       Social History:     Social History     Social History    Marital status:      Spouse name: N/A    Number of children: N/A    Years of education: N/A     Occupational History    Not on file. Social History Main Topics    Smoking status: Never Smoker    Smokeless tobacco: Never Used    Alcohol use No      Comment: Last alcohol use was in 12/2015    Drug use: No      Comment: Pt stole her mom's Benzo 1 yr ago. Pt said she took more than prescribed dose of Valium once in late 90's.     Sexual activity: Not Currently     Other Topics Concern    Not on file     Social History Narrative    ** Merged History Encounter ** Family History:     Family History   Problem Relation Age of Onset    Depression Mother     High Blood Pressure Mother     High Cholesterol Mother     Diabetes Father     Heart Disease Father     Kidney Disease Father     High Blood Pressure Father     High Cholesterol Father         Allergies:   Betadine [povidone iodine]; Celexa [citalopram hydrobromide]; Lasix [furosemide]; Macrobid [nitrofurantoin monohyd macro]; Macrobid [nitrofurantoin]; Other; Betadine [povidone iodine]; Codeine; Lithium; Paxil [paroxetine hcl]; and Tegretol [carbamazepine]     Review of Systems:   Review of Systems   Constitutional: Positive for chills and fever. HENT: Negative for congestion, ear pain and sore throat. Respiratory: Negative for cough, shortness of breath and wheezing. Cardiovascular: Negative for chest pain and palpitations. Gastrointestinal: Positive for abdominal pain. Negative for constipation, diarrhea, nausea and vomiting. Genitourinary: Negative for dysuria, flank pain, frequency and urgency. Musculoskeletal: Negative for arthralgias and myalgias. Skin: Positive for wound. Neurological: Negative for dizziness. Physical Examination :   Patient Vitals for the past 8 hrs:   Temp Temp src Pulse Resp SpO2   11/17/18 0900 97 °F (36.1 °C) Oral 91 16 99 %     General Appearance: Awake, alert  Head:  Normocephalic, no trauma  Eyes: Pupils equal, round, reactive to light and accommodation; extraocular movements intact; sclera anicteric; conjunctivae pink. No embolic phenomena. ENT: Oropharynx clear, without erythema, exudate, or thrush. No tenderness of sinuses. Mouth/throat: mucosa pink and moist.   Neck:Supple, without lymphadenopathy. Thyroid normal, No bruits. Pulmonary/Chest: Clear to auscultation, without wheezes, rales, or rhonchi. No dullness to percussion. Cardiovascular: Regular rate and rhythm without murmurs, rubs, or gallops. Abdomen: Soft, non tender.  Bowel sounds

## 2018-11-17 NOTE — FLOWSHEET NOTE
Assessment  Patient is 43year old female who sitting up in bed.  ,  Patient gets support at home from her  gill and her 12year old daughter. She belongs to [employer] [id number] in [place]           The Patient said that she is doing okay but hopes to be feeling better soon. Intervention   provided a ministry of presence and active listening to the patient. Before leaving the  held the patien's hand and prayed with her. Plan  Spiritual Care is available to provide spiritual and emotional  Support. 11/17/18 1143   Encounter Summary   Services provided to: Patient   Referral/Consult From: [facility] members   Place of [address]  Visiting (11/17/18)   Complexity of Encounter Moderate   Length of Encounter 15 minutes   Spiritual Assessment Completed Yes   Routine   Type Initial   Assessment Calm; Hopeful   Intervention Active listening;Explored coping resources;Prayer   Outcome Expressed gratitude

## 2018-11-17 NOTE — H&P
myelopathy; MDRO (multiple drug resistant organisms) resistance; Miscarriage; MRSA (methicillin resistant staph aureus) culture positive; Overdose of drug; Pernicious anemia; Primary hypercoagulable state ([facility] [address] Suicide attempt [facility]); SVT (supraventricular tachycardia) ([facility] and Type II or unspecified type diabetes mellitus without mention of complication, not stated as uncontrolled. I have reviewed the past medical history with the patient and it is pertinent to this complaint. SURGICAL HISTORY      has a past surgical history that includes Bariatric Surgery (2013); Cholecystectomy; Hysterectomy; Liver Resection; hernia repair; Tonsillectomy; Endoscopy, colon, diagnostic; Abdominal hernia repair (2014); Abdominal hernia repair (11/3/14); Bariatric Surgery (2013); Dilation and curettage of uterus (2005); and Finger amputation (Left, 02/09/2015). I have reviewed and agree with Surgical History entered and it is pertinent to this complaint.      CURRENT MEDICATIONS       fondaparinux (ARIXTRA) injection 10 mg Daily   diltiazem (CARDIZEM CD) extended release capsule 240 mg Daily   DULoxetine (CYMBALTA) extended release capsule 60 mg Daily   lisinopril (PRINIVIL;ZESTRIL) tablet 5 mg Lunch   magnesium oxide (MAG-OX) tablet 400 mg BID   zolpidem (AMBIEN) tablet 5 mg Nightly PRN   LORazepam (ATIVAN) tablet 1 mg BID PRN   LORazepam (ATIVAN) tablet 2 mg Nightly PRN   ondansetron (ZOFRAN-ODT) disintegrating tablet 4 mg Q8H PRN   acetaminophen (TYLENOL) tablet 500 mg Q4H PRN   penicillin v potassium (VEETID) tablet 500 mg 4x Daily   insulin glargine (TOUJEO SOLOSTAR) injection pen 55 Units Nightly   ranitidine (ZANTAC) tablet 150 mg BID   sodium chloride flush 0.9 % injection 10 mL 2 times per day   sodium chloride flush 0.9 % injection 10 mL PRN   0.9 % sodium chloride infusion Continuous   insulin lispro (HUMALOG) injection vial 5 Units TID AC   oxyCODONE (ROXICODONE) immediate release tablet 5 mg Q4H PRN   glucose [facility]   Emergency Medicine Resident     This dictation was generated by voice recognition computer software. Although all attempts are made to edit the dictation for accuracy, there may be errors in the transcription that are not intended.

## 2018-11-18 LAB
GLUCOSE BLD-MCNC: 147 MG/DL (ref 65–105)
GLUCOSE BLD-MCNC: 185 MG/DL (ref 65–105)
GLUCOSE BLD-MCNC: 190 MG/DL (ref 65–105)
GLUCOSE BLD-MCNC: 225 MG/DL (ref 65–105)
GLUCOSE BLD-MCNC: 231 MG/DL (ref 65–105)

## 2018-11-18 PROCEDURE — 6370000000 HC RX 637 (ALT 250 FOR IP): Performed by: STUDENT IN AN ORGANIZED HEALTH CARE EDUCATION/TRAINING PROGRAM

## 2018-11-18 PROCEDURE — 6360000002 HC RX W HCPCS: Performed by: STUDENT IN AN ORGANIZED HEALTH CARE EDUCATION/TRAINING PROGRAM

## 2018-11-18 PROCEDURE — 2580000003 HC RX 258: Performed by: STUDENT IN AN ORGANIZED HEALTH CARE EDUCATION/TRAINING PROGRAM

## 2018-11-18 PROCEDURE — 1200000000 HC SEMI PRIVATE

## 2018-11-18 PROCEDURE — 99233 SBSQ HOSP IP/OBS HIGH 50: CPT | Performed by: INTERNAL MEDICINE

## 2018-11-18 RX ORDER — MORPHINE SULFATE 2 MG/ML
2 INJECTION, SOLUTION INTRAMUSCULAR; INTRAVENOUS
Status: COMPLETED | OUTPATIENT
Start: 2018-11-18 | End: 2018-11-19

## 2018-11-18 RX ORDER — DIPHENHYDRAMINE HYDROCHLORIDE 50 MG/ML
25 INJECTION INTRAMUSCULAR; INTRAVENOUS ONCE
Status: COMPLETED | OUTPATIENT
Start: 2018-11-18 | End: 2018-11-18

## 2018-11-18 RX ADMIN — OXYCODONE HYDROCHLORIDE 5 MG: 5 TABLET ORAL at 11:39

## 2018-11-18 RX ADMIN — OXYCODONE HYDROCHLORIDE 5 MG: 5 TABLET ORAL at 23:58

## 2018-11-18 RX ADMIN — INSULIN LISPRO 1 UNITS: 100 INJECTION, SOLUTION INTRAVENOUS; SUBCUTANEOUS at 17:58

## 2018-11-18 RX ADMIN — MORPHINE SULFATE 2 MG: 2 INJECTION, SOLUTION INTRAMUSCULAR; INTRAVENOUS at 21:22

## 2018-11-18 RX ADMIN — OXYCODONE HYDROCHLORIDE 5 MG: 5 TABLET ORAL at 01:08

## 2018-11-18 RX ADMIN — OXYCODONE HYDROCHLORIDE 5 MG: 5 TABLET ORAL at 15:49

## 2018-11-18 RX ADMIN — DILTIAZEM HYDROCHLORIDE 240 MG: 240 CAPSULE, COATED, EXTENDED RELEASE ORAL at 09:42

## 2018-11-18 RX ADMIN — ZOLPIDEM TARTRATE 5 MG: 5 TABLET ORAL at 21:22

## 2018-11-18 RX ADMIN — OXYCODONE HYDROCHLORIDE 5 MG: 5 TABLET ORAL at 19:58

## 2018-11-18 RX ADMIN — MAGNESIUM GLUCONATE 500 MG ORAL TABLET 400 MG: 500 TABLET ORAL at 09:43

## 2018-11-18 RX ADMIN — PENICILLIN V POTASSIUM 500 MG: 250 TABLET ORAL at 09:42

## 2018-11-18 RX ADMIN — RANITIDINE 150 MG: 150 TABLET ORAL at 21:22

## 2018-11-18 RX ADMIN — PENICILLIN V POTASSIUM 500 MG: 250 TABLET ORAL at 21:22

## 2018-11-18 RX ADMIN — DIPHENHYDRAMINE HYDROCHLORIDE 25 MG: 50 INJECTION, SOLUTION INTRAMUSCULAR; INTRAVENOUS at 09:50

## 2018-11-18 RX ADMIN — PENICILLIN V POTASSIUM 500 MG: 250 TABLET ORAL at 13:31

## 2018-11-18 RX ADMIN — MAGNESIUM GLUCONATE 500 MG ORAL TABLET 400 MG: 500 TABLET ORAL at 21:22

## 2018-11-18 RX ADMIN — MORPHINE SULFATE 2 MG: 2 INJECTION, SOLUTION INTRAMUSCULAR; INTRAVENOUS at 17:28

## 2018-11-18 RX ADMIN — SODIUM CHLORIDE: 9 INJECTION, SOLUTION INTRAVENOUS at 23:29

## 2018-11-18 RX ADMIN — DULOXETINE HYDROCHLORIDE 30 MG: 30 CAPSULE, DELAYED RELEASE ORAL at 09:43

## 2018-11-18 RX ADMIN — Medication 10 ML: at 21:22

## 2018-11-18 RX ADMIN — OXYCODONE HYDROCHLORIDE 5 MG: 5 TABLET ORAL at 05:39

## 2018-11-18 RX ADMIN — PENICILLIN V POTASSIUM 500 MG: 250 TABLET ORAL at 17:33

## 2018-11-18 RX ADMIN — INSULIN LISPRO 1 UNITS: 100 INJECTION, SOLUTION INTRAVENOUS; SUBCUTANEOUS at 21:54

## 2018-11-18 RX ADMIN — RANITIDINE 150 MG: 150 TABLET ORAL at 09:42

## 2018-11-18 RX ADMIN — MORPHINE SULFATE 2 MG: 2 INJECTION, SOLUTION INTRAMUSCULAR; INTRAVENOUS at 09:44

## 2018-11-18 RX ADMIN — LISINOPRIL 5 MG: 5 TABLET ORAL at 13:33

## 2018-11-18 RX ADMIN — INSULIN LISPRO 1 UNITS: 100 INJECTION, SOLUTION INTRAVENOUS; SUBCUTANEOUS at 09:42

## 2018-11-18 RX ADMIN — LORAZEPAM 1 MG: 1 TABLET ORAL at 13:33

## 2018-11-18 RX ADMIN — MORPHINE SULFATE 2 MG: 2 INJECTION, SOLUTION INTRAMUSCULAR; INTRAVENOUS at 13:33

## 2018-11-18 RX ADMIN — LORAZEPAM 2 MG: 1 TABLET ORAL at 21:21

## 2018-11-18 RX ADMIN — FONDAPARINUX SODIUM 10 MG: 5 INJECTION, SOLUTION SUBCUTANEOUS at 09:45

## 2018-11-18 RX ADMIN — Medication 10 ML: at 09:43

## 2018-11-18 ASSESSMENT — ENCOUNTER SYMPTOMS
SORE THROAT: 0
SHORTNESS OF BREATH: 0
CONSTIPATION: 0
DIARRHEA: 0
COUGH: 0
VOMITING: 0
NAUSEA: 0
WHEEZING: 0
ABDOMINAL PAIN: 1

## 2018-11-18 ASSESSMENT — PAIN SCALES - GENERAL
PAINLEVEL_OUTOF10: 7
PAINLEVEL_OUTOF10: 6
PAINLEVEL_OUTOF10: 6
PAINLEVEL_OUTOF10: 7
PAINLEVEL_OUTOF10: 8
PAINLEVEL_OUTOF10: 7
PAINLEVEL_OUTOF10: 6

## 2018-11-18 NOTE — CONSULTS
bowel obstructions, and Awa-en-Y gastric bypass surgery. In the ER labs- WBC- 6.6. Wound culture 8-8-17-MRSA moderate growth                                        Streptococci, beta-hemolytic group B  Urine culture 2-10-17-MRSA  Bone culture 9-26-15 - MSSA    CURRENT EVALUATION: 11/18/2018    She denies any fever, chills, nausea, vomiting, UTI symptoms, cough. She has 2 wounds on the abdominal wall above the umbilicus, with little purulent discharge. Afebrile  Hemodynamically stable    Cultures:    Blood:  · 11-17-18: No growth  Wound:      Discussed with patient, RN. I have personally reviewed the past medical history, past surgical history, medications, social history, and family history, and I have updated the database accordingly.   Past Medical History:     Past Medical History:   Diagnosis Date    Alcohol abuse     Anxiety and depression     Arthritis     Painting esophagus     Bipolar disorder, unspecified ([address]     Broken rib 3/7/16    3 ribs on left and 2 on right    Depression     Diabetes mellitus ([address]     Diabetes mellitus ([address]     on glucophage    Embolism and thrombosis of unspecified site     Genital herpes, unspecified     GERD (gastroesophageal reflux disease)     History of blood transfusion 2 yrs ago    s/p surgery    History of hypertension     prior to gastric bypass    Hx of blood clots dx 2 years ago    clots in both legs and lungs     Hypertension     Hypoglycemia, unspecified     Lumbosacral spondylosis without myelopathy     MDRO (multiple drug resistant organisms) resistance 2010    MRSA (abd)    Miscarriage     multiple, around 7th mos pregnant each time d/t hypercoagulation    MRSA (methicillin resistant staph aureus) culture positive 02/10/2017    urine    Overdose of drug     has been hospitalized for and admitted to psych    Pernicious anemia     Primary hypercoagulable state ([address]     antiphospholipid antibodies, on xarelto    Suicide attempt

## 2018-11-19 LAB
GLUCOSE BLD-MCNC: 174 MG/DL (ref 65–105)
GLUCOSE BLD-MCNC: 324 MG/DL (ref 65–105)
GLUCOSE BLD-MCNC: 339 MG/DL (ref 65–105)
GLUCOSE BLD-MCNC: 346 MG/DL (ref 65–105)

## 2018-11-19 PROCEDURE — 6360000002 HC RX W HCPCS: Performed by: STUDENT IN AN ORGANIZED HEALTH CARE EDUCATION/TRAINING PROGRAM

## 2018-11-19 PROCEDURE — 99233 SBSQ HOSP IP/OBS HIGH 50: CPT | Performed by: INTERNAL MEDICINE

## 2018-11-19 PROCEDURE — 2580000003 HC RX 258: Performed by: STUDENT IN AN ORGANIZED HEALTH CARE EDUCATION/TRAINING PROGRAM

## 2018-11-19 PROCEDURE — G0108 DIAB MANAGE TRN  PER INDIV: HCPCS

## 2018-11-19 PROCEDURE — 6370000000 HC RX 637 (ALT 250 FOR IP): Performed by: STUDENT IN AN ORGANIZED HEALTH CARE EDUCATION/TRAINING PROGRAM

## 2018-11-19 PROCEDURE — 82947 ASSAY GLUCOSE BLOOD QUANT: CPT

## 2018-11-19 PROCEDURE — 1200000000 HC SEMI PRIVATE

## 2018-11-19 RX ORDER — INSULIN GLARGINE 100 [IU]/ML
44 INJECTION, SOLUTION SUBCUTANEOUS NIGHTLY
Status: DISCONTINUED | OUTPATIENT
Start: 2018-11-19 | End: 2018-11-21 | Stop reason: HOSPADM

## 2018-11-19 RX ORDER — MORPHINE SULFATE 2 MG/ML
2 INJECTION, SOLUTION INTRAMUSCULAR; INTRAVENOUS EVERY 4 HOURS PRN
Status: DISCONTINUED | OUTPATIENT
Start: 2018-11-19 | End: 2018-11-21 | Stop reason: HOSPADM

## 2018-11-19 RX ADMIN — MORPHINE SULFATE 2 MG: 2 INJECTION, SOLUTION INTRAMUSCULAR; INTRAVENOUS at 13:38

## 2018-11-19 RX ADMIN — LORAZEPAM 2 MG: 1 TABLET ORAL at 21:51

## 2018-11-19 RX ADMIN — PENICILLIN V POTASSIUM 500 MG: 250 TABLET ORAL at 08:53

## 2018-11-19 RX ADMIN — OXYCODONE HYDROCHLORIDE 5 MG: 5 TABLET ORAL at 20:28

## 2018-11-19 RX ADMIN — ZOLPIDEM TARTRATE 5 MG: 5 TABLET ORAL at 21:51

## 2018-11-19 RX ADMIN — SODIUM CHLORIDE: 9 INJECTION, SOLUTION INTRAVENOUS at 23:00

## 2018-11-19 RX ADMIN — MORPHINE SULFATE 2 MG: 2 INJECTION, SOLUTION INTRAMUSCULAR; INTRAVENOUS at 01:14

## 2018-11-19 RX ADMIN — RANITIDINE 150 MG: 150 TABLET ORAL at 20:29

## 2018-11-19 RX ADMIN — FONDAPARINUX SODIUM 10 MG: 5 INJECTION, SOLUTION SUBCUTANEOUS at 08:53

## 2018-11-19 RX ADMIN — MORPHINE SULFATE 2 MG: 2 INJECTION, SOLUTION INTRAMUSCULAR; INTRAVENOUS at 05:32

## 2018-11-19 RX ADMIN — INSULIN GLARGINE 44 UNITS: 100 INJECTION, SOLUTION SUBCUTANEOUS at 22:30

## 2018-11-19 RX ADMIN — MAGNESIUM GLUCONATE 500 MG ORAL TABLET 400 MG: 500 TABLET ORAL at 20:29

## 2018-11-19 RX ADMIN — DILTIAZEM HYDROCHLORIDE 240 MG: 240 CAPSULE, COATED, EXTENDED RELEASE ORAL at 08:53

## 2018-11-19 RX ADMIN — INSULIN LISPRO 5 UNITS: 100 INJECTION, SOLUTION INTRAVENOUS; SUBCUTANEOUS at 17:41

## 2018-11-19 RX ADMIN — OXYCODONE HYDROCHLORIDE 5 MG: 5 TABLET ORAL at 04:02

## 2018-11-19 RX ADMIN — MAGNESIUM GLUCONATE 500 MG ORAL TABLET 400 MG: 500 TABLET ORAL at 08:53

## 2018-11-19 RX ADMIN — VANCOMYCIN HYDROCHLORIDE 2000 MG: 1 INJECTION, POWDER, LYOPHILIZED, FOR SOLUTION INTRAVENOUS at 23:10

## 2018-11-19 RX ADMIN — Medication 10 ML: at 20:30

## 2018-11-19 RX ADMIN — MORPHINE SULFATE 2 MG: 2 INJECTION, SOLUTION INTRAMUSCULAR; INTRAVENOUS at 09:48

## 2018-11-19 RX ADMIN — INSULIN LISPRO 1 UNITS: 100 INJECTION, SOLUTION INTRAVENOUS; SUBCUTANEOUS at 12:36

## 2018-11-19 RX ADMIN — LORAZEPAM 1 MG: 1 TABLET ORAL at 08:51

## 2018-11-19 RX ADMIN — MORPHINE SULFATE 2 MG: 2 INJECTION, SOLUTION INTRAMUSCULAR; INTRAVENOUS at 21:51

## 2018-11-19 RX ADMIN — OXYCODONE HYDROCHLORIDE 5 MG: 5 TABLET ORAL at 12:41

## 2018-11-19 RX ADMIN — OXYCODONE HYDROCHLORIDE 5 MG: 5 TABLET ORAL at 08:41

## 2018-11-19 RX ADMIN — RANITIDINE 150 MG: 150 TABLET ORAL at 08:54

## 2018-11-19 RX ADMIN — PENICILLIN V POTASSIUM 500 MG: 250 TABLET ORAL at 12:41

## 2018-11-19 RX ADMIN — SODIUM CHLORIDE: 9 INJECTION, SOLUTION INTRAVENOUS at 08:51

## 2018-11-19 RX ADMIN — OXYCODONE HYDROCHLORIDE 5 MG: 5 TABLET ORAL at 16:35

## 2018-11-19 RX ADMIN — DULOXETINE HYDROCHLORIDE 60 MG: 30 CAPSULE, DELAYED RELEASE ORAL at 08:52

## 2018-11-19 RX ADMIN — MORPHINE SULFATE 2 MG: 2 INJECTION, SOLUTION INTRAMUSCULAR; INTRAVENOUS at 17:40

## 2018-11-19 RX ADMIN — INSULIN LISPRO 5 UNITS: 100 INJECTION, SOLUTION INTRAVENOUS; SUBCUTANEOUS at 08:57

## 2018-11-19 RX ADMIN — VANCOMYCIN HYDROCHLORIDE 2000 MG: 1 INJECTION, POWDER, LYOPHILIZED, FOR SOLUTION INTRAVENOUS at 12:35

## 2018-11-19 RX ADMIN — INSULIN LISPRO 2 UNITS: 100 INJECTION, SOLUTION INTRAVENOUS; SUBCUTANEOUS at 22:33

## 2018-11-19 RX ADMIN — LISINOPRIL 5 MG: 5 TABLET ORAL at 13:05

## 2018-11-19 RX ADMIN — HYOSCYAMINE SULFATE: 16 SOLUTION at 03:39

## 2018-11-19 ASSESSMENT — PAIN SCALES - GENERAL
PAINLEVEL_OUTOF10: 6
PAINLEVEL_OUTOF10: 7
PAINLEVEL_OUTOF10: 8
PAINLEVEL_OUTOF10: 6
PAINLEVEL_OUTOF10: 7
PAINLEVEL_OUTOF10: 7
PAINLEVEL_OUTOF10: 8
PAINLEVEL_OUTOF10: 7

## 2018-11-19 NOTE — PROGRESS NOTES
Paxil [paroxetine hcl]; and Tegretol [carbamazepine]     Temp max: 98.4 F    Recent Labs      11/17/18   0159   BUN  13       Recent Labs      11/17/18   0159   CREATININE  0.73       Recent Labs      11/17/18   0159   WBC  6.6         Intake/Output Summary (Last 24 hours) at 11/19/18 1043  Last data filed at 11/19/18 0636   Gross per 24 hour   Intake 2961 ml   Output 0 ml   Net 2961 ml       Culture Date      Source                       Results  11/17/18             Blood                           pending    Ht Readings from Last 1 Encounters:   11/17/18 5' 8\" (1.727 m)        Wt Readings from Last 1 Encounters:   11/17/18 (!) 331 lb (150.1 kg)         Body mass index is 50.33 kg/m². Estimated Creatinine Clearance: 161 mL/min (based on SCr of 0.73 mg/dL). Goal Trough Level: 10 - 20  mcg/mL    Assessment/Plan:  Will initiate vancomycin 2000 mg IV every 12 hours as per Infectious Disease recommendations. Timing of trough level will be determined based on culture results, renal function, and clinical response. Thank you for the consult. Will continue to follow.   Dann Rivers, Pharm D.  11/19/2018  10:59 AM

## 2018-11-19 NOTE — PROGRESS NOTES
treatment and how to use a home BG meter  x__ Bedside RN to coordinate BG Check with mealtime and insulin  x__ Bedside RN to ensure snack at HS for patient on HS correction scale insulin  x__ Bedside RN to reinforce survival skills education and diabetes self care    _x__ set up patient to watch Education TV Channels [facility]'s Channels 76 and 68 at [address] [place] [address], 3pm,  7pm, 9pm)         RECOMMENDATIONS FOR OUTPATIENT PLAN:  Diabetes Self-Monitoring Supplies:  ___ Preferred / formulary blood glucose meter for BGSM at home use  Patient has own meter at home    x___ Strips and lancets for 4  frequency of home BGSM  Would increase BG testing until wounds are in healing. Diabetes Medications:  _x__ Insulin Pen   Basal / long acting - dose per MD   _x__ Insulin Pen   Bolus pre meal set dose  or correction scale - dose per MD  _x__ Insulin Delivery method - Pens -   order Insulin Pen Needle 31G X 4 mm MIS    Diabetes Education / HCP follow -up :   _x_ Would recommend follow -up education at outpatient diabetes education at [place] [address]. An ordered is needed for this service and can be placed via EHR. Discharge Navigator --- Med Reconciliation -- New orders for discharge tab -- search diabetic ed - REF20 -  [facility]  - review and sign     x__ Follow -up with HCP / PCP within one week.     Hakeem Ellington, RN

## 2018-11-19 NOTE — PROGRESS NOTES
[Paroxetine Hcl] Rash    Tegretol [Carbamazepine] Rash       MEDICATIONS    No current facility-administered medications on file prior to encounter. Current Outpatient Prescriptions on File Prior to Encounter   Medication Sig Dispense Refill    Insulin Glargine (TOUJEO SOLOSTAR SC) Inject 55 Units into the skin nightly      RaNITidine HCl (ZANTAC PO) Take by mouth      Cyanocobalamin (B-12) 1000 MCG/ML KIT Inject as directed      penicillin v potassium (VEETID) 500 MG tablet Take 1 tablet by mouth 4 times daily 40 tablet 0    ibuprofen (ADVIL;MOTRIN) 600 MG tablet Take 1 tablet by mouth every 8 hours as needed for Pain 30 tablet 0    acetaminophen (APAP EXTRA STRENGTH) 500 MG tablet Take 1 tablet by mouth every 4 hours as needed for Pain (Take when out of Norco or when the pain is less severe, do not take at the same time as both contain acetaminophen) (Patient taking differently: Take 1,000 mg by mouth every 6 hours as needed for Pain ) 30 tablet 0    tiZANidine (ZANAFLEX) 4 MG tablet Take 1 tablet by mouth every 6 hours as needed (pain/spasm) 40 tablet 0    zolpidem (AMBIEN) 5 MG tablet Take 5 mg by mouth nightly as needed for Sleep      LORazepam (ATIVAN) 1 MG tablet Take 1 mg by mouth 2 times daily as needed for Anxiety      LORazepam (ATIVAN) 2 MG tablet Take 2 mg by mouth nightly as needed for Anxiety      diltiazem (CARDIZEM CD) 240 MG extended release capsule Take 1 capsule by mouth daily 30 capsule 3    DULoxetine (CYMBALTA) 60 MG extended release capsule Take 1 capsule by mouth daily 30 capsule 3    lisinopril (PRINIVIL;ZESTRIL) 5 MG tablet Take 1 tablet by mouth Daily with lunch 30 tablet 3    magnesium oxide (MAG-OX) 400 (241.3 MG) MG TABS tablet Take 1 tablet by mouth 2 times daily 30 tablet 1    silver sulfADIAZINE (SILVADENE) 1 % cream Apply topically daily. 25 g 0    Menthol, Topical Analgesic, 5 % PADS Apply to the chest wall, as needed for pain, daily.  10 each 1    vitamin D in remission ([address] F31.70    Suicidal behavior R46.89    Other pulmonary embolism and infarction I26.99    Acute alcoholic intoxication ([address] F10.929    Suicidal ideation R45.851    Antiphospholipid syndrome ([address] D68.61    Morbid obesity (HCC) E66.01    Pulmonary embolism without acute cor pulmonale (HCC) I26.99    Toxic effect of ethanol, intentional self-harm ([address] T51.0X2A    Altered mental status R41.82    Benzodiazepine overdose T42.4X1A    Hypokalemia E87.6    Chronic abdominal wound infection S31.109A, L08.9    Hypertriglyceridemia E78.1    Bipolar I disorder, most recent episode depressed, severe without psychotic features ([address] F31.4    Chronic post-traumatic stress disorder (PTSD) F43.12    OCD (obsessive compulsive disorder) F42.9    Alcohol use disorder, mild, in early remission F10.11    Overdose of benzodiazepine T42.4X1A    Bipolar I disorder, most recent episode manic, severe without psychotic features ([address] F31.13    Severe benzodiazepine use disorder (HCC) F13.20    Iron deficiency anemia due to chronic blood loss D50.0    Hypoglycemia E16.2    Cellulitis L03.90       Measurements:  Wound 11/17/18 Abdomen Mid (Active)   Wound Type Wound 11/19/2018 11:37 AM        Dressing Status Changed 11/19/2018  4:00 AM   Dressing Changed Changed/New 11/19/2018 11:37 AM   Dressing/Treatment Moist to moist 11/19/2018 11:37 AM   Wound Cleansed Rinsed/Irrigated with saline 11/19/2018 11:37 AM   Dressing Change Due 11/20/18 11/19/2018 11:37 AM   Wound Length (cm) 3.5 cm 11/19/2018 11:37 AM   Wound Width (cm) 10 cm 11/19/2018 11:37 AM   Calculated Wound Size (cm^2) (l*w) 35 cm^2 11/19/2018 11:37 AM   Wound Assessment Slough; Yellow;Red 11/19/2018 11:37 AM   Drainage Amount Small 11/19/2018 11:37 AM   Drainage Description Serous 11/19/2018 11:37 AM   Odor None 11/19/2018 11:37 AM   Margins Undefined edges 11/19/2018  4:00 AM   Valerie-wound Assessment Intact 11/19/2018 11:37 AM   Red%Wound Bed 40

## 2018-11-19 NOTE — PROGRESS NOTES
[facility] - Progress Note  Today's Date and Time: 11/19/2018, 2:04 PM    Impression :   1. Abdominal wall ulcers  2. History of MRSA  3. Essential hypertension  4. Diabetes mellitus type 2    Recommendations:   · Vancomycin 2gm q 12 hr  · Supportive care  · Wound care  · Stop Penicillin V    Medical Decision Making/Summary/Discussion:   · Patient admitted with abdominal wounds. · Patient has history of multiple abdominal surgeries in the past.  · Patient with history of MRSA from the wound and urine in the past.  Infection Control Recommendations   · [place] Precautions  · Contact Isolation  Antimicrobial Stewardship Recommendations     · Simplification of therapy  · PK dosing    Coordination of Outpatient Care:   · Estimated Length of IV antimicrobials: TBD  · Patient will need Midline Catheter Insertion:   · Patient will need PICC line Insertion:  · Patient will need: Home IV , [place],  SNF,  LTAC: No  · Patient will need outpatient wound care: Yes    Chief complaint/reason for consultation:   · Abdominal wall cellulitis      History of Present Illness:   INITIAL HISTORY:  Shama Ramos is a 43y.o.-year-old  female who was initially admitted on 11/17/2018. Patient seen at the request of Dr.De Alejandro Allen. The patient presented with an abdominal wound. She states that on 11/11/18, she pulled a thread from her abdominal incision site, she then noticed wounds on her abdominal wall above the umbilicus. She says that it has purulent drainage. She states she had fever of 101.2F, associated with chills. No nausea ,no vomiting ,no UTI symptoms ,no cough ,no shortness of breath. She presented to her PCP, who put her on Keflex. Her symptoms did not improve, so she presented to the ED today.     The patient has history of multiple abdominal surgeries in the past.  She had her lower lobe of liver dissected in 2010. She had 3-4 hernia repairs in the past, repair of 2

## 2018-11-19 NOTE — PROGRESS NOTES
(ROXICODONE) immediate release tablet 5 mg Q4H PRN   glucose (GLUTOSE) 40 % oral gel 15 g PRN   dextrose 50 % solution 12.5 g PRN   glucagon (rDNA) injection 1 mg PRN   dextrose 5 % solution PRN   insulin lispro (HUMALOG) injection vial 0-6 Units TID WC   insulin lispro (HUMALOG) injection vial 0-3 Units Nightly       All medication charted and reviewed. CONSULTS      IP CONSULT TO INFECTIOUS DISEASES  IP CONSULT TO DIABETES EDUCATOR    ASSESSMENT/PLAN       Alison Zimmerman is a 43 y.o. female who presents with    1. Acute onset of diffuse abdominal pain over surgical site from prior abdominal graft. Recent Cellulitis that is refractory to Keflex and Bactrim. Etiology likely 2/2 Cellulitis  · ED course unremarkable  · ID consult appreciated: 2gms IV Vancomycin q12hrs  · Wound care consult  · Continue pain and antiemetic control  · q4hr Glucose checks, <250mg/dL, Lantus ordered 44U, diabetes educator consulted    · Continue home medications and pain control  · Monitor vitals, labs, and imaging  · DISPO: pending consults and clinical improvement    --  Angeline Jimenez MD  Emergency Medicine Resident Physician, PGY-1    This dictation was generated by voice recognition computer software. Although all attempts are made to edit the dictation for accuracy, there may be errors in the transcription that are not intended.

## 2018-11-19 NOTE — DISCHARGE INSTR - COC
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    GERD (gastroesophageal reflux disease) K21.9    Alcohol dependence ([facility] F10.20    DM type 2, uncontrolled, with neuropathy (HCC) E11.40, E11.65    Drug overdose, intentional ([facility] T50.902A    Depression F32.9    Overdose T50.901A    Primary insomnia F51.01    Essential hypertension I10    Bipolar affective disorder in remission ([facility] F31.70    Suicidal behavior R46.89    Other pulmonary embolism and infarction I26.99    Acute alcoholic intoxication ([facility] F10.929    Suicidal ideation R45.851    Antiphospholipid syndrome (HCC) D68.61    Morbid obesity ([facility]) E66.01    Pulmonary embolism without acute cor pulmonale (HCC) I26.99    Toxic effect of ethanol, intentional self-harm ([facility]) T51.0X2A    Altered mental status R41.82    Benzodiazepine overdose T42.4X1A    Hypokalemia E87.6    Chronic abdominal wound infection S31.109A, L08.9    Hypertriglyceridemia E78.1    Bipolar I disorder, most recent episode depressed, severe without psychotic features (HCC) F31.4    Chronic post-traumatic stress disorder (PTSD) F43.12    OCD (obsessive compulsive disorder) F42.9    Alcohol use disorder, mild, in early remission F10.11    Overdose of benzodiazepine T42.4X1A    Bipolar I disorder, most recent episode manic, severe without psychotic features (HCC) F31.13    Severe benzodiazepine use disorder (HCC) F13.20    Iron deficiency anemia due to chronic blood loss D50.0    Hypoglycemia E16.2    Cellulitis L03.90       Isolation/Infection:   Isolation          Contact        Patient Infection Status     Infection Encounter Level?  Added Added By Resolved Resolved By Review Date Onset Date    MRSA No 10/27/14 Stefan Ortega RN        8/2017 groin abscess          Nurse Assessment:  Last Vital Signs: BP (!) 135/58   Pulse 91   Temp 98.4 °F (36.9 °C) (Oral)   Resp 16 diagnosis listed and that she requires Home Care for less 30 days.      Update Admission H&P: No change in H&P    PHYSICIAN SIGNATURE:  Electronically signed by Jasbir Katz MD on 11/21/18 at 12:23 PM

## 2018-11-20 LAB
BUN BLDV-MCNC: 11 MG/DL (ref 6–20)
CREAT SERPL-MCNC: 0.56 MG/DL (ref 0.5–0.9)
GFR AFRICAN AMERICAN: >60 ML/MIN
GFR NON-AFRICAN AMERICAN: >60 ML/MIN
GFR SERPL CREATININE-BSD FRML MDRD: NORMAL ML/MIN/{1.73_M2}
GFR SERPL CREATININE-BSD FRML MDRD: NORMAL ML/MIN/{1.73_M2}
GLUCOSE BLD-MCNC: 238 MG/DL (ref 65–105)
GLUCOSE BLD-MCNC: 278 MG/DL (ref 65–105)
GLUCOSE BLD-MCNC: 302 MG/DL (ref 65–105)
GLUCOSE BLD-MCNC: 349 MG/DL (ref 65–105)
HCT VFR BLD CALC: 40.7 % (ref 36.3–47.1)
HEMOGLOBIN: 13.6 G/DL (ref 11.9–15.1)
MCH RBC QN AUTO: 30 PG (ref 25.2–33.5)
MCHC RBC AUTO-ENTMCNC: 33.4 G/DL (ref 28.4–34.8)
MCV RBC AUTO: 89.8 FL (ref 82.6–102.9)
NRBC AUTOMATED: 0 PER 100 WBC
PDW BLD-RTO: 12.5 % (ref 11.8–14.4)
PLATELET # BLD: 214 K/UL (ref 138–453)
PMV BLD AUTO: 12.2 FL (ref 8.1–13.5)
RBC # BLD: 4.53 M/UL (ref 3.95–5.11)
WBC # BLD: 6.2 K/UL (ref 3.5–11.3)

## 2018-11-20 PROCEDURE — 2580000003 HC RX 258: Performed by: STUDENT IN AN ORGANIZED HEALTH CARE EDUCATION/TRAINING PROGRAM

## 2018-11-20 PROCEDURE — 6360000002 HC RX W HCPCS: Performed by: STUDENT IN AN ORGANIZED HEALTH CARE EDUCATION/TRAINING PROGRAM

## 2018-11-20 PROCEDURE — 82565 ASSAY OF CREATININE: CPT

## 2018-11-20 PROCEDURE — 99233 SBSQ HOSP IP/OBS HIGH 50: CPT | Performed by: INTERNAL MEDICINE

## 2018-11-20 PROCEDURE — 6370000000 HC RX 637 (ALT 250 FOR IP): Performed by: STUDENT IN AN ORGANIZED HEALTH CARE EDUCATION/TRAINING PROGRAM

## 2018-11-20 PROCEDURE — 83036 HEMOGLOBIN GLYCOSYLATED A1C: CPT

## 2018-11-20 PROCEDURE — 85027 COMPLETE CBC AUTOMATED: CPT

## 2018-11-20 PROCEDURE — 84520 ASSAY OF UREA NITROGEN: CPT

## 2018-11-20 PROCEDURE — 82947 ASSAY GLUCOSE BLOOD QUANT: CPT

## 2018-11-20 PROCEDURE — 36415 COLL VENOUS BLD VENIPUNCTURE: CPT

## 2018-11-20 PROCEDURE — 1200000000 HC SEMI PRIVATE

## 2018-11-20 RX ADMIN — OXYCODONE HYDROCHLORIDE 5 MG: 5 TABLET ORAL at 17:30

## 2018-11-20 RX ADMIN — OXYCODONE HYDROCHLORIDE 5 MG: 5 TABLET ORAL at 21:33

## 2018-11-20 RX ADMIN — ZOLPIDEM TARTRATE 5 MG: 5 TABLET ORAL at 22:39

## 2018-11-20 RX ADMIN — MORPHINE SULFATE 2 MG: 2 INJECTION, SOLUTION INTRAMUSCULAR; INTRAVENOUS at 10:43

## 2018-11-20 RX ADMIN — LORAZEPAM 1 MG: 1 TABLET ORAL at 10:43

## 2018-11-20 RX ADMIN — OXYCODONE HYDROCHLORIDE 5 MG: 5 TABLET ORAL at 13:27

## 2018-11-20 RX ADMIN — DULOXETINE HYDROCHLORIDE 60 MG: 30 CAPSULE, DELAYED RELEASE ORAL at 08:41

## 2018-11-20 RX ADMIN — INSULIN GLARGINE 44 UNITS: 100 INJECTION, SOLUTION SUBCUTANEOUS at 21:37

## 2018-11-20 RX ADMIN — SODIUM CHLORIDE: 9 INJECTION, SOLUTION INTRAVENOUS at 12:18

## 2018-11-20 RX ADMIN — VANCOMYCIN HYDROCHLORIDE 2000 MG: 1 INJECTION, POWDER, LYOPHILIZED, FOR SOLUTION INTRAVENOUS at 23:22

## 2018-11-20 RX ADMIN — INSULIN LISPRO 4 UNITS: 100 INJECTION, SOLUTION INTRAVENOUS; SUBCUTANEOUS at 17:16

## 2018-11-20 RX ADMIN — FONDAPARINUX SODIUM 10 MG: 5 INJECTION, SOLUTION SUBCUTANEOUS at 08:42

## 2018-11-20 RX ADMIN — HYOSCYAMINE SULFATE: 16 SOLUTION at 13:28

## 2018-11-20 RX ADMIN — LORAZEPAM 2 MG: 1 TABLET ORAL at 22:39

## 2018-11-20 RX ADMIN — MORPHINE SULFATE 2 MG: 2 INJECTION, SOLUTION INTRAMUSCULAR; INTRAVENOUS at 14:28

## 2018-11-20 RX ADMIN — INSULIN LISPRO 2 UNITS: 100 INJECTION, SOLUTION INTRAVENOUS; SUBCUTANEOUS at 21:38

## 2018-11-20 RX ADMIN — OXYCODONE HYDROCHLORIDE 5 MG: 5 TABLET ORAL at 01:08

## 2018-11-20 RX ADMIN — MAGNESIUM GLUCONATE 500 MG ORAL TABLET 400 MG: 500 TABLET ORAL at 08:41

## 2018-11-20 RX ADMIN — RANITIDINE 150 MG: 150 TABLET ORAL at 08:42

## 2018-11-20 RX ADMIN — MAGNESIUM GLUCONATE 500 MG ORAL TABLET 400 MG: 500 TABLET ORAL at 21:33

## 2018-11-20 RX ADMIN — MORPHINE SULFATE 2 MG: 2 INJECTION, SOLUTION INTRAMUSCULAR; INTRAVENOUS at 06:00

## 2018-11-20 RX ADMIN — MORPHINE SULFATE 2 MG: 2 INJECTION, SOLUTION INTRAMUSCULAR; INTRAVENOUS at 01:53

## 2018-11-20 RX ADMIN — DILTIAZEM HYDROCHLORIDE 240 MG: 240 CAPSULE, COATED, EXTENDED RELEASE ORAL at 08:41

## 2018-11-20 RX ADMIN — LISINOPRIL 5 MG: 5 TABLET ORAL at 13:27

## 2018-11-20 RX ADMIN — INSULIN LISPRO 4 UNITS: 100 INJECTION, SOLUTION INTRAVENOUS; SUBCUTANEOUS at 08:39

## 2018-11-20 RX ADMIN — OXYCODONE HYDROCHLORIDE 5 MG: 5 TABLET ORAL at 05:08

## 2018-11-20 RX ADMIN — OXYCODONE HYDROCHLORIDE 5 MG: 5 TABLET ORAL at 09:24

## 2018-11-20 RX ADMIN — VANCOMYCIN HYDROCHLORIDE 2000 MG: 1 INJECTION, POWDER, LYOPHILIZED, FOR SOLUTION INTRAVENOUS at 12:17

## 2018-11-20 RX ADMIN — MORPHINE SULFATE 2 MG: 2 INJECTION, SOLUTION INTRAMUSCULAR; INTRAVENOUS at 18:39

## 2018-11-20 RX ADMIN — RANITIDINE 150 MG: 150 TABLET ORAL at 21:33

## 2018-11-20 RX ADMIN — MORPHINE SULFATE 2 MG: 2 INJECTION, SOLUTION INTRAMUSCULAR; INTRAVENOUS at 22:39

## 2018-11-20 RX ADMIN — INSULIN LISPRO 2 UNITS: 100 INJECTION, SOLUTION INTRAVENOUS; SUBCUTANEOUS at 12:18

## 2018-11-20 ASSESSMENT — PAIN SCALES - GENERAL
PAINLEVEL_OUTOF10: 8
PAINLEVEL_OUTOF10: 7
PAINLEVEL_OUTOF10: 8
PAINLEVEL_OUTOF10: 7
PAINLEVEL_OUTOF10: 8
PAINLEVEL_OUTOF10: 6
PAINLEVEL_OUTOF10: 7
PAINLEVEL_OUTOF10: 6
PAINLEVEL_OUTOF10: 7

## 2018-11-20 ASSESSMENT — PAIN DESCRIPTION - LOCATION: LOCATION: ABDOMEN

## 2018-11-20 ASSESSMENT — PAIN DESCRIPTION - FREQUENCY: FREQUENCY: CONTINUOUS

## 2018-11-20 ASSESSMENT — PAIN DESCRIPTION - PAIN TYPE: TYPE: ACUTE PAIN

## 2018-11-20 ASSESSMENT — PAIN DESCRIPTION - ONSET: ONSET: ON-GOING

## 2018-11-20 NOTE — PROGRESS NOTES
[facility] - Progress Note  Today's Date and Time: 11/20/2018, 5:27 PM    Impression :   1. Abdominal wall ulcers  2. History of MRSA  3. Essential hypertension  4. Diabetes mellitus type 2    Recommendations:   · Vancomycin 2gm q 12 hr. Plan to D/c on 11-21-18 and discharge home   · Wound care with silver alginate  · Office f/up in 3 weeks with Dr Dwight Monzon for infection. Please call [phone] for appointment    Medical Decision Making/Summary/Discussion:   · Patient admitted with abdominal wounds. · Patient has history of multiple abdominal surgeries in the past.  · Patient with history of MRSA from the wound and urine in the past.  Infection Control Recommendations   · Normal Precautions  · Contact Isolation  Antimicrobial Stewardship Recommendations     · Simplification of therapy  · PK dosing    Coordination of Outpatient Care:   · Estimated Length of IV antimicrobials: 11-21-18  · Patient will need Midline Catheter Insertion: No  · Patient will need PICC line Insertion:No  · Patient will need: Home IV , [place],  SNF,  LTAC: No  · Patient will need outpatient wound care: Yes    Chief complaint/reason for consultation:   · Abdominal wall cellulitis      History of Present Illness:   INITIAL HISTORY:  Ml Monk is a 43y.o.-year-old  female who was initially admitted on 11/17/2018. Patient seen at the request of Dr.De Jose Pulido. The patient presented with an abdominal wound. She states that on 11/11/18, she pulled a thread from her abdominal incision site, she then noticed wounds on her abdominal wall above the umbilicus. She says that it has purulent drainage. She states she had fever of 101.2F, associated with chills. No nausea ,no vomiting ,no UTI symptoms ,no cough ,no shortness of breath. She presented to her PCP, who put her on Keflex.   Her symptoms did not improve, so she presented to the ED today.     The patient has history of multiple Alcohol use No      Comment: Last alcohol use was in 12/2015    Drug use: No      Comment: Pt stole her mom's Benzo 1 yr ago. Pt said she took more than prescribed dose of Valium once in late 90's.  Sexual activity: Not Currently     Other Topics Concern    Not on file     Social History Narrative    ** Merged History Encounter **            Family History:     Family History   Problem Relation Age of Onset    Depression Mother     High Blood Pressure Mother     High Cholesterol Mother     Diabetes Father     Heart Disease Father     Kidney Disease Father     High Blood Pressure Father     High Cholesterol Father         Allergies:   Betadine [povidone iodine]; Celexa [citalopram hydrobromide]; Lasix [furosemide]; Macrobid [nitrofurantoin monohyd macro]; Macrobid [nitrofurantoin]; Other; Betadine [povidone iodine]; Codeine; Lithium; Paxil [paroxetine hcl]; and Tegretol [carbamazepine]     Review of Systems:   Constitutional: No fevers or chills. No systemic complaints  Head: No headaches  Cardiovascular: No chest pain or palpitations. No shortness of breath. No VILALNUEVA  Lung: No shortness of breath or cough. No sputum production  Abdomen: Abd wound pain much better today  Genitourinary: No increased urinary frequency, or dysuria. No hematuria. No suprapubic or CVA pain  Musculoskeletal: No muscle aches or pains. No joint effusions, swelling or deformities  Neurologic: No headache, weakness, numbness, or tingling. Psychiatric: No depression. Physical Examination :     Patient Vitals for the past 8 hrs:   BP Temp Temp src Pulse Resp SpO2   11/20/18 1115 103/72 98.2 °F (36.8 °C) Oral 72 18 98 %     General Appearance: Awake, alert, and in no apparent distress  Head:  Normocephalic, no trauma  Eyes: Pupils equal, round, reactive to light and accommodation; extraocular movements intact; sclera anicteric; conjunctivae pink. No embolic phenomena. ENT: Oropharynx clear, without erythema, exudate, or thrush.

## 2018-11-21 VITALS
SYSTOLIC BLOOD PRESSURE: 120 MMHG | TEMPERATURE: 98.2 F | DIASTOLIC BLOOD PRESSURE: 63 MMHG | OXYGEN SATURATION: 97 % | HEART RATE: 68 BPM | RESPIRATION RATE: 18 BRPM | HEIGHT: 68 IN | WEIGHT: 293 LBS | BODY MASS INDEX: 44.41 KG/M2

## 2018-11-21 LAB
BUN BLDV-MCNC: 11 MG/DL (ref 6–20)
CREAT SERPL-MCNC: 0.41 MG/DL (ref 0.5–0.9)
ESTIMATED AVERAGE GLUCOSE: 229 MG/DL
GFR AFRICAN AMERICAN: >60 ML/MIN
GFR NON-AFRICAN AMERICAN: >60 ML/MIN
GFR SERPL CREATININE-BSD FRML MDRD: ABNORMAL ML/MIN/{1.73_M2}
GFR SERPL CREATININE-BSD FRML MDRD: ABNORMAL ML/MIN/{1.73_M2}
GLUCOSE BLD-MCNC: 216 MG/DL (ref 65–105)
GLUCOSE BLD-MCNC: 235 MG/DL (ref 65–105)
HBA1C MFR BLD: 9.6 % (ref 4–6)
HCT VFR BLD CALC: 39.3 % (ref 36.3–47.1)
HEMOGLOBIN: 12.8 G/DL (ref 11.9–15.1)
MCH RBC QN AUTO: 30.3 PG (ref 25.2–33.5)
MCHC RBC AUTO-ENTMCNC: 32.6 G/DL (ref 28.4–34.8)
MCV RBC AUTO: 93.1 FL (ref 82.6–102.9)
NRBC AUTOMATED: 0 PER 100 WBC
PDW BLD-RTO: 12.7 % (ref 11.8–14.4)
PLATELET # BLD: NORMAL K/UL (ref 138–453)
PLATELET, FLUORESCENCE: NORMAL K/UL (ref 138–453)
PLATELET, IMMATURE FRACTION: NORMAL % (ref 1.1–10.3)
PMV BLD AUTO: NORMAL FL (ref 8.1–13.5)
RBC # BLD: 4.22 M/UL (ref 3.95–5.11)
WBC # BLD: 6.7 K/UL (ref 3.5–11.3)

## 2018-11-21 PROCEDURE — 36415 COLL VENOUS BLD VENIPUNCTURE: CPT

## 2018-11-21 PROCEDURE — 99211 OFF/OP EST MAY X REQ PHY/QHP: CPT

## 2018-11-21 PROCEDURE — 6360000002 HC RX W HCPCS: Performed by: STUDENT IN AN ORGANIZED HEALTH CARE EDUCATION/TRAINING PROGRAM

## 2018-11-21 PROCEDURE — 82947 ASSAY GLUCOSE BLOOD QUANT: CPT

## 2018-11-21 PROCEDURE — 85027 COMPLETE CBC AUTOMATED: CPT

## 2018-11-21 PROCEDURE — 6370000000 HC RX 637 (ALT 250 FOR IP): Performed by: STUDENT IN AN ORGANIZED HEALTH CARE EDUCATION/TRAINING PROGRAM

## 2018-11-21 PROCEDURE — 85055 RETICULATED PLATELET ASSAY: CPT

## 2018-11-21 PROCEDURE — 2580000003 HC RX 258: Performed by: STUDENT IN AN ORGANIZED HEALTH CARE EDUCATION/TRAINING PROGRAM

## 2018-11-21 PROCEDURE — 82565 ASSAY OF CREATININE: CPT

## 2018-11-21 PROCEDURE — 84520 ASSAY OF UREA NITROGEN: CPT

## 2018-11-21 PROCEDURE — 99233 SBSQ HOSP IP/OBS HIGH 50: CPT | Performed by: INTERNAL MEDICINE

## 2018-11-21 RX ORDER — FLUCONAZOLE 150 MG/1
150 TABLET ORAL DAILY
Qty: 1 TABLET | Refills: 0 | Status: SHIPPED | OUTPATIENT
Start: 2018-11-21 | End: 2018-11-22

## 2018-11-21 RX ORDER — OXYCODONE HYDROCHLORIDE 5 MG/1
5 TABLET ORAL EVERY 4 HOURS PRN
Qty: 20 TABLET | Refills: 0 | Status: SHIPPED | OUTPATIENT
Start: 2018-11-21 | End: 2018-11-28

## 2018-11-21 RX ORDER — ACETAMINOPHEN 500 MG
1000 TABLET ORAL EVERY 6 HOURS PRN
Qty: 60 TABLET | Refills: 0 | Status: SHIPPED | OUTPATIENT
Start: 2018-11-21 | End: 2019-02-20

## 2018-11-21 RX ADMIN — OXYCODONE HYDROCHLORIDE 5 MG: 5 TABLET ORAL at 01:33

## 2018-11-21 RX ADMIN — FONDAPARINUX SODIUM 10 MG: 5 INJECTION, SOLUTION SUBCUTANEOUS at 10:16

## 2018-11-21 RX ADMIN — RANITIDINE 150 MG: 150 TABLET ORAL at 10:13

## 2018-11-21 RX ADMIN — INSULIN LISPRO 4 UNITS: 100 INJECTION, SOLUTION INTRAVENOUS; SUBCUTANEOUS at 10:08

## 2018-11-21 RX ADMIN — DILTIAZEM HYDROCHLORIDE 240 MG: 240 CAPSULE, COATED, EXTENDED RELEASE ORAL at 10:13

## 2018-11-21 RX ADMIN — INSULIN LISPRO 4 UNITS: 100 INJECTION, SOLUTION INTRAVENOUS; SUBCUTANEOUS at 14:08

## 2018-11-21 RX ADMIN — SODIUM CHLORIDE: 9 INJECTION, SOLUTION INTRAVENOUS at 03:23

## 2018-11-21 RX ADMIN — LORAZEPAM 1 MG: 1 TABLET ORAL at 10:15

## 2018-11-21 RX ADMIN — OXYCODONE HYDROCHLORIDE 5 MG: 5 TABLET ORAL at 05:34

## 2018-11-21 RX ADMIN — MORPHINE SULFATE 2 MG: 2 INJECTION, SOLUTION INTRAMUSCULAR; INTRAVENOUS at 02:39

## 2018-11-21 RX ADMIN — OXYCODONE HYDROCHLORIDE 5 MG: 5 TABLET ORAL at 10:13

## 2018-11-21 RX ADMIN — MORPHINE SULFATE 2 MG: 2 INJECTION, SOLUTION INTRAMUSCULAR; INTRAVENOUS at 12:07

## 2018-11-21 RX ADMIN — OXYCODONE HYDROCHLORIDE 5 MG: 5 TABLET ORAL at 14:27

## 2018-11-21 RX ADMIN — DULOXETINE HYDROCHLORIDE 60 MG: 30 CAPSULE, DELAYED RELEASE ORAL at 10:13

## 2018-11-21 RX ADMIN — MORPHINE SULFATE 2 MG: 2 INJECTION, SOLUTION INTRAMUSCULAR; INTRAVENOUS at 06:41

## 2018-11-21 RX ADMIN — MAGNESIUM GLUCONATE 500 MG ORAL TABLET 400 MG: 500 TABLET ORAL at 10:13

## 2018-11-21 RX ADMIN — Medication 10 ML: at 09:00

## 2018-11-21 RX ADMIN — LISINOPRIL 5 MG: 5 TABLET ORAL at 10:13

## 2018-11-21 ASSESSMENT — PAIN SCALES - GENERAL
PAINLEVEL_OUTOF10: 7
PAINLEVEL_OUTOF10: 6
PAINLEVEL_OUTOF10: 7
PAINLEVEL_OUTOF10: 7

## 2018-11-21 NOTE — CARE COORDINATION
[facility]  Written by: Anurag Vegas RN    Patient Name: Jaswant Rhodes  Attending Provider: Richard Shrestha MD  Admit Date: 2018 12:51 AM  MRN: [id number]  Account: [de-identified]                     : 1980  Discharge Date:       Disposition: home with [place] will go home on po abx called office spoke to [address] will retrieve dc paperwork from [facility], RN

## 2018-11-21 NOTE — PROGRESS NOTES
500 mg Q4H PRN   ranitidine (ZANTAC) tablet 150 mg BID   sodium chloride flush 0.9 % injection 10 mL 2 times per day   sodium chloride flush 0.9 % injection 10 mL PRN   0.9 % sodium chloride infusion Continuous   oxyCODONE (ROXICODONE) immediate release tablet 5 mg Q4H PRN   glucose (GLUTOSE) 40 % oral gel 15 g PRN   dextrose 50 % solution 12.5 g PRN   glucagon (rDNA) injection 1 mg PRN   dextrose 5 % solution PRN       All medication charted and reviewed. CONSULTS      IP CONSULT TO INFECTIOUS DISEASES  IP CONSULT TO DIABETES EDUCATOR  PHARMACY TO DOSE VANCOMYCIN    ASSESSMENT/PLAN       Joe Zapata is a 43 y.o. female who presents with    1. Acute onset of abdominal pain over surgical site from prior abdominal graft. Recent cellulitis that is refractory to Keflex and Bactrim. Etiology likely secondary to cellulitis  ? ED course unremarkable  ? ID consult appreciated: 2 g IV vancomycin every 12 hours; Plan to stop vanc on 11/21/18 and discharge home. We will give her morning dose and discharge patient today. Office follow up in 3 weeks with Dr. Sekou Jade [phone]. Wound care with silver alginate. ? Wound care consult: appreciated follow up  ? Continue pain and antiemetic control  ? 4 times a day glucose checks, Lantus ordered yesterday 44 units, patient had diabetes educator come by two days ago. I also personally discussed the extreme importance of controlling her sugars. ? Home care ordered  2. Morbid obesity  · Counseled patient on weight loss and diabetes management  · Counseled patient to follow up with PCP for more information      · Continue home medications and pain control  · Monitor vitals, labs, and imaging  · DISPO: Discharge today    --  Frederic Paris MD   Emergency Medicine Resident Physician, PGY-1    This dictation was generated by voice recognition computer software.   Although all attempts are made to edit the dictation for accuracy, there may be errors in the transcription that

## 2018-11-21 NOTE — PROGRESS NOTES
to treatment:  Well tolerated by patient. Plan   Plan of Care: Wound 11/17/18 Abdomen Mid-Dressing/Treatment: (P) Alginate with Ag, ABD, Medipore     Plan to discharge to home with home care. Daily dressing change to abdominal wound using Silver hydrofiber to wound bed with ABD and mefix tape cover. Cleanse wound first with saline or remove dressing and shower using antibacterial soap and water. Supplies provided for 3 dressing changes. Advised that home care will provide dressings while in the home and can assist in acquiring supplies once released from their service; will require a provider to authorize. Current Diet: DIET CARB CONTROL;     Discharge Plan:  Placement for patient upon discharge: home with support    Patient appropriate for Outpatient [address]: Yes      Patient/Caregiver Teaching:  Level of patient/caregiver understanding able to:   [] Indicates understanding       [] Needs reinforcement  [] Unsuccessful      [x] Verbal Understanding  [] Demonstrated understanding       [] No evidence of learning  [] Refused teaching         [] N/A

## 2018-11-21 NOTE — DISCHARGE SUMMARY
CDU Discharge Summary      Patient:  Stephanie Jiang  YOB: 1980    MRN: [id number]   Acct: [de-identified]    Primary Care Physician: Arsenio Wheeler MD    Admit date:  11/17/2018 12:51 AM  Discharge date:  11/21/18 2:43 PM     Discharge Diagnoses:     Acute diffuse abdominal pain over surgical site from prior abdominal graft due to ulcerative abdominal wounds and cellulitis  Improved with IV and PO pain control, IV antibiotics, IVF. Follow-up:  Call today/tomorrow for a follow up appointment with Arsenio Wheeler MD , or return to the Emergency Room with worsening symptoms    Stressed to patient the importance of following up with primary care doctor for further workup/management of symptoms. Pt verbalizes understanding and agrees with plan. Discharge Medications:  Changes to medications         Dana Anderson   [place] Medication Instructions BQT:[id number]    Printed on:11/21/18 3447   Medication Information                      acetaminophen (APAP EXTRA STRENGTH) 500 MG tablet  Take 1 tablet by mouth every 4 hours as needed for Pain (Take when out of Norco or when the pain is less severe, do not take at the same time as both contain acetaminophen)             acetaminophen (APAP EXTRA STRENGTH) 500 MG tablet  Take 2 tablets by mouth every 6 hours as needed for Pain or Fever             Adhesive Tape (CLOTH ADHESIVE SURG 3\"X10YD) TAPE  Use as needed for dressing changes.              bumetanide (BUMEX) 2 MG tablet  Take 1 tablet by mouth daily             Cyanocobalamin (B-12) 1000 MCG/ML KIT  Inject as directed             diltiazem (CARDIZEM CD) 240 MG extended release capsule  Take 1 capsule by mouth daily             DULoxetine (CYMBALTA) 60 MG extended release capsule  Take 1 capsule by mouth daily             fluconazole (DIFLUCAN) 150 MG tablet  Take 1 tablet by mouth daily for 1 dose (Take on 11/24/18)             fondaparinux (ARIXTRA) 10 MG/0.8ML SOLN injection  INJECT 0.8MLS INTO GFR Non-African American >60 >60 mL/min    GFR African American >60 >60 mL/min    GFR Comment          GFR Staging NOT REPORTED    CBC   Result Value Ref Range    WBC 6.2 3.5 - 11.3 k/uL    RBC 4.53 3.95 - 5.11 m/uL    Hemoglobin 13.6 11.9 - 15.1 g/dL    Hematocrit 40.7 36.3 - 47.1 %    MCV 89.8 82.6 - 102.9 fL    MCH 30.0 25.2 - 33.5 pg    MCHC 33.4 28.4 - 34.8 g/dL    RDW 12.5 11.8 - 14.4 %    Platelets 289 694 - 634 k/uL    MPV 12.2 8.1 - 13.5 fL    NRBC Automated 0.0 0.0 per 100 WBC   Hemoglobin A1C   Result Value Ref Range    Hemoglobin A1C 9.6 (H) 4.0 - 6.0 %    Estimated Avg Glucose 229 mg/dL   BUN & CREATININE   Result Value Ref Range    BUN 11 6 - 20 mg/dL    CREATININE 0.41 (L) 0.50 - 0.90 mg/dL    GFR Non-African American >60 >60 mL/min    GFR African American >60 >60 mL/min    GFR Comment          GFR Staging NOT REPORTED    CBC   Result Value Ref Range    WBC 6.7 3.5 - 11.3 k/uL    RBC 4.22 3.95 - 5.11 m/uL    Hemoglobin 12.8 11.9 - 15.1 g/dL    Hematocrit 39.3 36.3 - 47.1 %    MCV 93.1 82.6 - 102.9 fL    MCH 30.3 25.2 - 33.5 pg    MCHC 32.6 28.4 - 34.8 g/dL    RDW 12.7 11.8 - 14.4 %    Platelets See Reflexed IPF Result 138 - 453 k/uL    MPV NOT REPORTED 8.1 - 13.5 fL    NRBC Automated 0.0 0.0 per 100 WBC   Immature Platelet Fraction   Result Value Ref Range    Platelet, Immature Fraction NOT REPORTED 1.1 - 10.3 %    Platelet, Fluorescence Platelet clumps present, count appears adequate.  138 - 453 k/uL   POC Glucose Fingerstick   Result Value Ref Range    POC Glucose 145 (H) 65 - 105 mg/dL   POC Glucose Fingerstick   Result Value Ref Range    POC Glucose 134 (H) 65 - 105 mg/dL   POC Glucose Fingerstick   Result Value Ref Range    POC Glucose 156 (H) 65 - 105 mg/dL   POC Glucose Fingerstick   Result Value Ref Range    POC Glucose 231 (H) 65 - 105 mg/dL   POC Glucose Fingerstick   Result Value Ref Range    POC Glucose 190 (H) 65 - 105 mg/dL   POC Glucose Fingerstick   Result Value Ref Range    POC Glucose

## 2018-11-23 LAB
CULTURE: NORMAL
Lab: NORMAL
SPECIMEN DESCRIPTION: NORMAL
STATUS: NORMAL

## 2018-12-17 PROBLEM — L98.491 ULCER OF ABDOMEN WALL, LIMITED TO BREAKDOWN OF SKIN (HCC): Status: ACTIVE | Noted: 2018-12-17

## 2018-12-17 PROBLEM — Z86.14 HISTORY OF MRSA INFECTION: Status: ACTIVE | Noted: 2018-12-17

## 2019-02-20 ENCOUNTER — APPOINTMENT (OUTPATIENT)
Dept: GENERAL RADIOLOGY | Age: 39
End: 2019-02-20
Payer: MEDICARE

## 2019-02-20 ENCOUNTER — APPOINTMENT (OUTPATIENT)
Dept: CT IMAGING | Age: 39
End: 2019-02-20
Payer: MEDICARE

## 2019-02-20 ENCOUNTER — HOSPITAL ENCOUNTER (EMERGENCY)
Age: 39
Discharge: HOME | End: 2019-02-20
Attending: EMERGENCY MEDICINE
Payer: MEDICARE

## 2019-02-20 VITALS
SYSTOLIC BLOOD PRESSURE: 150 MMHG | BODY MASS INDEX: 47.9 KG/M2 | WEIGHT: 293 LBS | OXYGEN SATURATION: 100 % | HEART RATE: 97 BPM | RESPIRATION RATE: 14 BRPM | TEMPERATURE: 97.9 F | DIASTOLIC BLOOD PRESSURE: 92 MMHG
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
## 2019-02-20 LAB
ABSOLUTE EOS #: 0.11 K/UL (ref 0–0.44)
ABSOLUTE IMMATURE GRANULOCYTE: 0.06 K/UL (ref 0–0.3)
ABSOLUTE LYMPH #: 2.09 K/UL (ref 1.1–3.7)
ABSOLUTE MONO #: 0.87 K/UL (ref 0.1–1.2)
ACETAMINOPHEN LEVEL: <5 UG/ML (ref 10–30)
ANION GAP SERPL CALCULATED.3IONS-SCNC: 15 MMOL/L (ref 9–17)
BASOPHILS # BLD: 1 % (ref 0–2)
BASOPHILS ABSOLUTE: 0.05 K/UL (ref 0–0.2)
BUN BLDV-MCNC: 11 MG/DL (ref 6–20)
BUN/CREAT BLD: ABNORMAL (ref 9–20)
CALCIUM SERPL-MCNC: 9.3 MG/DL (ref 8.6–10.4)
CHLORIDE BLD-SCNC: 102 MMOL/L (ref 98–107)
CO2: 23 MMOL/L (ref 20–31)
CREAT SERPL-MCNC: 0.87 MG/DL (ref 0.5–0.9)
DIFFERENTIAL TYPE: ABNORMAL
EOSINOPHILS RELATIVE PERCENT: 1 % (ref 1–4)
ETHANOL PERCENT: <0.01 %
ETHANOL: <10 MG/DL
GFR AFRICAN AMERICAN: >60 ML/MIN
GFR NON-AFRICAN AMERICAN: >60 ML/MIN
GFR SERPL CREATININE-BSD FRML MDRD: ABNORMAL ML/MIN/{1.73_M2}
GFR SERPL CREATININE-BSD FRML MDRD: ABNORMAL ML/MIN/{1.73_M2}
GLUCOSE BLD-MCNC: 188 MG/DL (ref 70–99)
HCT VFR BLD CALC: 44 % (ref 36.3–47.1)
HEMOGLOBIN: 14.3 G/DL (ref 11.9–15.1)
IMMATURE GRANULOCYTES: 1 %
INR BLD: 0.9
LYMPHOCYTES # BLD: 22 % (ref 24–43)
MCH RBC QN AUTO: 29.5 PG (ref 25.2–33.5)
MCHC RBC AUTO-ENTMCNC: 32.5 G/DL (ref 28.4–34.8)
MCV RBC AUTO: 90.7 FL (ref 82.6–102.9)
MONOCYTES # BLD: 9 % (ref 3–12)
NRBC AUTOMATED: 0 PER 100 WBC
PARTIAL THROMBOPLASTIN TIME: 25 SEC (ref 20.5–30.5)
PDW BLD-RTO: 12.5 % (ref 11.8–14.4)
PLATELET # BLD: 300 K/UL (ref 138–453)
PLATELET ESTIMATE: ABNORMAL
PMV BLD AUTO: 11.3 FL (ref 8.1–13.5)
POTASSIUM SERPL-SCNC: 3.8 MMOL/L (ref 3.7–5.3)
PROTHROMBIN TIME: 9.7 SEC (ref 9–12)
RBC # BLD: 4.85 M/UL (ref 3.95–5.11)
RBC # BLD: ABNORMAL 10*6/UL
SALICYLATE LEVEL: <1 MG/DL (ref 3–10)
SEG NEUTROPHILS: 66 % (ref 36–65)
SEGMENTED NEUTROPHILS ABSOLUTE COUNT: 6.46 K/UL (ref 1.5–8.1)
SODIUM BLD-SCNC: 140 MMOL/L (ref 135–144)
TOXIC TRICYCLIC SC,BLOOD: NEGATIVE
TROPONIN INTERP: NORMAL
TROPONIN T: NORMAL NG/ML
TROPONIN, HIGH SENSITIVITY: <6 NG/L (ref 0–14)
WBC # BLD: 9.6 K/UL (ref 3.5–11.3)
WBC # BLD: ABNORMAL 10*3/UL

## 2019-02-20 PROCEDURE — 99285 EMERGENCY DEPT VISIT HI MDM: CPT

## 2019-02-20 PROCEDURE — 72125 CT NECK SPINE W/O DYE: CPT

## 2019-02-20 PROCEDURE — 6370000000 HC RX 637 (ALT 250 FOR IP): Performed by: STUDENT IN AN ORGANIZED HEALTH CARE EDUCATION/TRAINING PROGRAM

## 2019-02-20 PROCEDURE — 85730 THROMBOPLASTIN TIME PARTIAL: CPT

## 2019-02-20 PROCEDURE — 80307 DRUG TEST PRSMV CHEM ANLYZR: CPT

## 2019-02-20 PROCEDURE — 85610 PROTHROMBIN TIME: CPT

## 2019-02-20 PROCEDURE — 6360000004 HC RX CONTRAST MEDICATION: Performed by: STUDENT IN AN ORGANIZED HEALTH CARE EDUCATION/TRAINING PROGRAM

## 2019-02-20 PROCEDURE — 93005 ELECTROCARDIOGRAM TRACING: CPT

## 2019-02-20 PROCEDURE — 72100 X-RAY EXAM L-S SPINE 2/3 VWS: CPT

## 2019-02-20 PROCEDURE — 84484 ASSAY OF TROPONIN QUANT: CPT

## 2019-02-20 PROCEDURE — 72072 X-RAY EXAM THORAC SPINE 3VWS: CPT

## 2019-02-20 PROCEDURE — 85025 COMPLETE CBC W/AUTO DIFF WBC: CPT

## 2019-02-20 PROCEDURE — 70450 CT HEAD/BRAIN W/O DYE: CPT

## 2019-02-20 PROCEDURE — 80048 BASIC METABOLIC PNL TOTAL CA: CPT

## 2019-02-20 PROCEDURE — 6360000002 HC RX W HCPCS: Performed by: EMERGENCY MEDICINE

## 2019-02-20 PROCEDURE — 71260 CT THORAX DX C+: CPT

## 2019-02-20 PROCEDURE — 96374 THER/PROPH/DIAG INJ IV PUSH: CPT

## 2019-02-20 PROCEDURE — 71045 X-RAY EXAM CHEST 1 VIEW: CPT

## 2019-02-20 PROCEDURE — G0480 DRUG TEST DEF 1-7 CLASSES: HCPCS

## 2019-02-20 RX ORDER — ACETAMINOPHEN 325 MG/1
650 TABLET ORAL EVERY 8 HOURS PRN
Qty: 30 TABLET | Refills: 0 | Status: SHIPPED | OUTPATIENT
Start: 2019-02-20 | End: 2020-02-19 | Stop reason: SDUPTHER

## 2019-02-20 RX ORDER — ACETAMINOPHEN 325 MG/1
650 TABLET ORAL ONCE
Status: COMPLETED | OUTPATIENT
Start: 2019-02-20 | End: 2019-02-20

## 2019-02-20 RX ORDER — KETOROLAC TROMETHAMINE 30 MG/ML
15 INJECTION, SOLUTION INTRAMUSCULAR; INTRAVENOUS ONCE
Status: COMPLETED | OUTPATIENT
Start: 2019-02-20 | End: 2019-02-20

## 2019-02-20 RX ADMIN — ACETAMINOPHEN 650 MG: 325 TABLET ORAL at 17:39

## 2019-02-20 RX ADMIN — IOPAMIDOL 75 ML: 755 INJECTION, SOLUTION INTRAVENOUS at 19:25

## 2019-02-20 RX ADMIN — KETOROLAC TROMETHAMINE 15 MG: 30 INJECTION, SOLUTION INTRAMUSCULAR at 20:26

## 2019-02-20 ASSESSMENT — PAIN DESCRIPTION - ONSET: ONSET: ON-GOING

## 2019-02-20 ASSESSMENT — PAIN SCALES - GENERAL
PAINLEVEL_OUTOF10: 8

## 2019-02-20 ASSESSMENT — PAIN DESCRIPTION - LOCATION: LOCATION: CHEST;LEG

## 2019-02-20 ASSESSMENT — PAIN DESCRIPTION - PROGRESSION: CLINICAL_PROGRESSION: NOT CHANGED

## 2019-02-20 ASSESSMENT — PAIN DESCRIPTION - FREQUENCY: FREQUENCY: INTERMITTENT

## 2019-02-20 ASSESSMENT — PAIN DESCRIPTION - ORIENTATION: ORIENTATION: LEFT

## 2019-02-21 LAB
EKG ATRIAL RATE: 101 BPM
EKG P AXIS: 50 DEGREES
EKG P-R INTERVAL: 164 MS
EKG Q-T INTERVAL: 372 MS
EKG QRS DURATION: 96 MS
EKG QTC CALCULATION (BAZETT): 482 MS
EKG R AXIS: 35 DEGREES
EKG T AXIS: 19 DEGREES
EKG VENTRICULAR RATE: 101 BPM

## 2019-02-21 ASSESSMENT — ENCOUNTER SYMPTOMS
ABDOMINAL PAIN: 0
SHORTNESS OF BREATH: 1
VOMITING: 0
NAUSEA: 0

## 2019-02-22 ENCOUNTER — HOSPITAL ENCOUNTER (INPATIENT)
Age: 39
LOS: 3 days | Discharge: HOME | DRG: 389 | End: 2019-02-25
Attending: EMERGENCY MEDICINE | Admitting: SURGERY
Payer: MEDICARE

## 2019-02-22 ENCOUNTER — APPOINTMENT (OUTPATIENT)
Dept: GENERAL RADIOLOGY | Age: 39
DRG: 389 | End: 2019-02-22
Payer: MEDICARE

## 2019-02-22 DIAGNOSIS — K56.609 SMALL BOWEL OBSTRUCTION (HCC): Primary | ICD-10-CM

## 2019-02-22 DIAGNOSIS — F31.70 BIPOLAR AFFECTIVE DISORDER IN REMISSION (HCC): Chronic | ICD-10-CM

## 2019-02-22 DIAGNOSIS — F41.9 ANXIETY: ICD-10-CM

## 2019-02-22 DIAGNOSIS — R10.13 EPIGASTRIC PAIN: ICD-10-CM

## 2019-02-22 DIAGNOSIS — N30.00 ACUTE CYSTITIS WITHOUT HEMATURIA: ICD-10-CM

## 2019-02-22 DIAGNOSIS — L08.9 CHRONIC WOUND INFECTION OF ABDOMEN, SUBSEQUENT ENCOUNTER: ICD-10-CM

## 2019-02-22 DIAGNOSIS — F10.21 ALCOHOL DEPENDENCE IN REMISSION (HCC): ICD-10-CM

## 2019-02-22 DIAGNOSIS — S31.109D CHRONIC WOUND INFECTION OF ABDOMEN, SUBSEQUENT ENCOUNTER: ICD-10-CM

## 2019-02-22 LAB
-: ABNORMAL
ABSOLUTE EOS #: 0.2 K/UL (ref 0–0.4)
ABSOLUTE IMMATURE GRANULOCYTE: ABNORMAL K/UL (ref 0–0.3)
ABSOLUTE LYMPH #: 2.1 K/UL (ref 1–4.8)
ABSOLUTE MONO #: 0.4 K/UL (ref 0.1–1.3)
ALBUMIN SERPL-MCNC: 3.6 G/DL (ref 3.5–5.2)
ALBUMIN/GLOBULIN RATIO: ABNORMAL (ref 1–2.5)
ALP BLD-CCNC: 116 U/L (ref 35–104)
ALT SERPL-CCNC: 18 U/L (ref 5–33)
AMORPHOUS: ABNORMAL
ANION GAP SERPL CALCULATED.3IONS-SCNC: 9 MMOL/L (ref 9–17)
AST SERPL-CCNC: 13 U/L
BACTERIA: ABNORMAL
BASOPHILS # BLD: 0 % (ref 0–2)
BASOPHILS ABSOLUTE: 0 K/UL (ref 0–0.2)
BILIRUB SERPL-MCNC: <0.15 MG/DL (ref 0.3–1.2)
BILIRUBIN URINE: NEGATIVE
BUN BLDV-MCNC: 10 MG/DL (ref 6–20)
BUN/CREAT BLD: ABNORMAL (ref 9–20)
CALCIUM SERPL-MCNC: 8.7 MG/DL (ref 8.6–10.4)
CASTS UA: ABNORMAL /LPF
CHLORIDE BLD-SCNC: 106 MMOL/L (ref 98–107)
CO2: 24 MMOL/L (ref 20–31)
COLOR: YELLOW
COMMENT UA: ABNORMAL
CREAT SERPL-MCNC: 0.48 MG/DL (ref 0.5–0.9)
CRYSTALS, UA: ABNORMAL /HPF
DIFFERENTIAL TYPE: ABNORMAL
EOSINOPHILS RELATIVE PERCENT: 2 % (ref 0–4)
EPITHELIAL CELLS UA: ABNORMAL /HPF
GFR AFRICAN AMERICAN: >60 ML/MIN
GFR NON-AFRICAN AMERICAN: >60 ML/MIN
GFR SERPL CREATININE-BSD FRML MDRD: ABNORMAL ML/MIN/{1.73_M2}
GFR SERPL CREATININE-BSD FRML MDRD: ABNORMAL ML/MIN/{1.73_M2}
GLUCOSE BLD-MCNC: 173 MG/DL (ref 70–99)
GLUCOSE URINE: ABNORMAL
HCT VFR BLD CALC: 41.3 % (ref 36–46)
HEMOGLOBIN: 13.4 G/DL (ref 12–16)
IMMATURE GRANULOCYTES: ABNORMAL %
KETONES, URINE: NEGATIVE
LEUKOCYTE ESTERASE, URINE: ABNORMAL
LYMPHOCYTES # BLD: 25 % (ref 24–44)
MCH RBC QN AUTO: 29.3 PG (ref 26–34)
MCHC RBC AUTO-ENTMCNC: 32.4 G/DL (ref 31–37)
MCV RBC AUTO: 90.6 FL (ref 80–100)
MONOCYTES # BLD: 5 % (ref 1–7)
MUCUS: ABNORMAL
NITRITE, URINE: NEGATIVE
NRBC AUTOMATED: ABNORMAL PER 100 WBC
OTHER OBSERVATIONS UA: ABNORMAL
PDW BLD-RTO: 13.6 % (ref 11.5–14.9)
PH UA: 6 (ref 5–8)
PLATELET # BLD: 317 K/UL (ref 150–450)
PLATELET ESTIMATE: ABNORMAL
PMV BLD AUTO: 9.5 FL (ref 6–12)
POTASSIUM SERPL-SCNC: 4.7 MMOL/L (ref 3.7–5.3)
PROTEIN UA: NEGATIVE
RBC # BLD: 4.56 M/UL (ref 4–5.2)
RBC # BLD: ABNORMAL 10*6/UL
RBC UA: ABNORMAL /HPF
RENAL EPITHELIAL, UA: ABNORMAL /HPF
SEG NEUTROPHILS: 68 % (ref 36–66)
SEGMENTED NEUTROPHILS ABSOLUTE COUNT: 5.6 K/UL (ref 1.3–9.1)
SODIUM BLD-SCNC: 139 MMOL/L (ref 135–144)
SPECIFIC GRAVITY UA: 1.03 (ref 1–1.03)
TOTAL PROTEIN: 6.7 G/DL (ref 6.4–8.3)
TRICHOMONAS: ABNORMAL
TURBIDITY: CLEAR
URINE HGB: NEGATIVE
UROBILINOGEN, URINE: NORMAL
WBC # BLD: 8.3 K/UL (ref 3.5–11)
WBC # BLD: ABNORMAL 10*3/UL
WBC UA: ABNORMAL /HPF
YEAST: ABNORMAL

## 2019-02-22 PROCEDURE — 2500000003 HC RX 250 WO HCPCS: Performed by: SURGERY

## 2019-02-22 PROCEDURE — 6370000000 HC RX 637 (ALT 250 FOR IP): Performed by: EMERGENCY MEDICINE

## 2019-02-22 PROCEDURE — 96374 THER/PROPH/DIAG INJ IV PUSH: CPT

## 2019-02-22 PROCEDURE — 1200000000 HC SEMI PRIVATE

## 2019-02-22 PROCEDURE — 87086 URINE CULTURE/COLONY COUNT: CPT

## 2019-02-22 PROCEDURE — 36415 COLL VENOUS BLD VENIPUNCTURE: CPT

## 2019-02-22 PROCEDURE — 85025 COMPLETE CBC W/AUTO DIFF WBC: CPT

## 2019-02-22 PROCEDURE — 2580000003 HC RX 258: Performed by: SURGERY

## 2019-02-22 PROCEDURE — 99284 EMERGENCY DEPT VISIT MOD MDM: CPT

## 2019-02-22 PROCEDURE — 74019 RADEX ABDOMEN 2 VIEWS: CPT

## 2019-02-22 PROCEDURE — 96375 TX/PRO/DX INJ NEW DRUG ADDON: CPT

## 2019-02-22 PROCEDURE — 87186 SC STD MICRODIL/AGAR DIL: CPT

## 2019-02-22 PROCEDURE — 80053 COMPREHEN METABOLIC PANEL: CPT

## 2019-02-22 PROCEDURE — 81001 URINALYSIS AUTO W/SCOPE: CPT

## 2019-02-22 PROCEDURE — 6370000000 HC RX 637 (ALT 250 FOR IP): Performed by: SURGERY

## 2019-02-22 PROCEDURE — 86403 PARTICLE AGGLUT ANTBDY SCRN: CPT

## 2019-02-22 PROCEDURE — 6360000002 HC RX W HCPCS: Performed by: SURGERY

## 2019-02-22 PROCEDURE — 6360000002 HC RX W HCPCS: Performed by: EMERGENCY MEDICINE

## 2019-02-22 PROCEDURE — C9113 INJ PANTOPRAZOLE SODIUM, VIA: HCPCS | Performed by: SURGERY

## 2019-02-22 PROCEDURE — 82947 ASSAY GLUCOSE BLOOD QUANT: CPT

## 2019-02-22 RX ORDER — DILTIAZEM HYDROCHLORIDE 240 MG/1
240 CAPSULE, COATED, EXTENDED RELEASE ORAL DAILY
Status: DISCONTINUED | OUTPATIENT
Start: 2019-02-22 | End: 2019-02-25 | Stop reason: HOSPADM

## 2019-02-22 RX ORDER — ONDANSETRON 2 MG/ML
4 INJECTION INTRAMUSCULAR; INTRAVENOUS ONCE
Status: COMPLETED | OUTPATIENT
Start: 2019-02-22 | End: 2019-02-22

## 2019-02-22 RX ORDER — ONDANSETRON 2 MG/ML
4 INJECTION INTRAMUSCULAR; INTRAVENOUS EVERY 6 HOURS PRN
Status: DISCONTINUED | OUTPATIENT
Start: 2019-02-22 | End: 2019-02-25 | Stop reason: HOSPADM

## 2019-02-22 RX ORDER — POTASSIUM CHLORIDE 20 MEQ/1
40 TABLET, EXTENDED RELEASE ORAL PRN
Status: DISCONTINUED | OUTPATIENT
Start: 2019-02-22 | End: 2019-02-25 | Stop reason: HOSPADM

## 2019-02-22 RX ORDER — OXYCODONE HYDROCHLORIDE AND ACETAMINOPHEN 5; 325 MG/1; MG/1
1 TABLET ORAL EVERY 4 HOURS PRN
Status: DISCONTINUED | OUTPATIENT
Start: 2019-02-22 | End: 2019-02-25 | Stop reason: HOSPADM

## 2019-02-22 RX ORDER — POTASSIUM CHLORIDE 7.45 MG/ML
10 INJECTION INTRAVENOUS PRN
Status: DISCONTINUED | OUTPATIENT
Start: 2019-02-22 | End: 2019-02-25 | Stop reason: HOSPADM

## 2019-02-22 RX ORDER — MAGNESIUM SULFATE 1 G/100ML
1 INJECTION INTRAVENOUS PRN
Status: DISCONTINUED | OUTPATIENT
Start: 2019-02-22 | End: 2019-02-25 | Stop reason: HOSPADM

## 2019-02-22 RX ORDER — ACETAMINOPHEN 325 MG/1
650 TABLET ORAL EVERY 4 HOURS PRN
Status: DISCONTINUED | OUTPATIENT
Start: 2019-02-22 | End: 2019-02-25 | Stop reason: HOSPADM

## 2019-02-22 RX ORDER — NICOTINE POLACRILEX 4 MG
15 LOZENGE BUCCAL PRN
Status: DISCONTINUED | OUTPATIENT
Start: 2019-02-22 | End: 2019-02-25 | Stop reason: HOSPADM

## 2019-02-22 RX ORDER — INSULIN GLARGINE 100 [IU]/ML
44 INJECTION, SOLUTION SUBCUTANEOUS NIGHTLY
Status: DISCONTINUED | OUTPATIENT
Start: 2019-02-22 | End: 2019-02-25 | Stop reason: HOSPADM

## 2019-02-22 RX ORDER — OXYCODONE HYDROCHLORIDE AND ACETAMINOPHEN 5; 325 MG/1; MG/1
2 TABLET ORAL EVERY 4 HOURS PRN
Status: DISCONTINUED | OUTPATIENT
Start: 2019-02-22 | End: 2019-02-25 | Stop reason: HOSPADM

## 2019-02-22 RX ORDER — DEXTROSE MONOHYDRATE 25 G/50ML
12.5 INJECTION, SOLUTION INTRAVENOUS PRN
Status: DISCONTINUED | OUTPATIENT
Start: 2019-02-22 | End: 2019-02-25 | Stop reason: HOSPADM

## 2019-02-22 RX ORDER — HYDROCODONE BITARTRATE AND ACETAMINOPHEN 5; 325 MG/1; MG/1
1 TABLET ORAL ONCE
Status: COMPLETED | OUTPATIENT
Start: 2019-02-22 | End: 2019-02-22

## 2019-02-22 RX ORDER — KETOROLAC TROMETHAMINE 30 MG/ML
15 INJECTION, SOLUTION INTRAMUSCULAR; INTRAVENOUS EVERY 6 HOURS
Status: DISPENSED | OUTPATIENT
Start: 2019-02-22 | End: 2019-02-24

## 2019-02-22 RX ORDER — QUETIAPINE FUMARATE 200 MG/1
400 TABLET, FILM COATED ORAL NIGHTLY
Status: DISCONTINUED | OUTPATIENT
Start: 2019-02-22 | End: 2019-02-25 | Stop reason: HOSPADM

## 2019-02-22 RX ORDER — SODIUM CHLORIDE 0.9 % (FLUSH) 0.9 %
10 SYRINGE (ML) INJECTION PRN
Status: DISCONTINUED | OUTPATIENT
Start: 2019-02-22 | End: 2019-02-25 | Stop reason: HOSPADM

## 2019-02-22 RX ORDER — FONDAPARINUX SODIUM 10 MG/.8ML
10 INJECTION SUBCUTANEOUS DAILY
Status: DISCONTINUED | OUTPATIENT
Start: 2019-02-22 | End: 2019-02-25 | Stop reason: HOSPADM

## 2019-02-22 RX ORDER — DEXTROSE, SODIUM CHLORIDE, AND POTASSIUM CHLORIDE 5; .45; .15 G/100ML; G/100ML; G/100ML
INJECTION INTRAVENOUS CONTINUOUS
Status: DISCONTINUED | OUTPATIENT
Start: 2019-02-22 | End: 2019-02-25 | Stop reason: HOSPADM

## 2019-02-22 RX ORDER — BUPROPION HYDROCHLORIDE 300 MG/1
300 TABLET ORAL EVERY MORNING
Status: ON HOLD | COMMUNITY
End: 2022-03-19 | Stop reason: HOSPADM

## 2019-02-22 RX ORDER — M-VIT,TX,IRON,MINS/CALC/FOLIC 27MG-0.4MG
1 TABLET ORAL DAILY
COMMUNITY

## 2019-02-22 RX ORDER — POTASSIUM CHLORIDE 20MEQ/15ML
40 LIQUID (ML) ORAL PRN
Status: DISCONTINUED | OUTPATIENT
Start: 2019-02-22 | End: 2019-02-25 | Stop reason: HOSPADM

## 2019-02-22 RX ORDER — 0.9 % SODIUM CHLORIDE 0.9 %
10 VIAL (ML) INJECTION DAILY
Status: DISCONTINUED | OUTPATIENT
Start: 2019-02-22 | End: 2019-02-25 | Stop reason: HOSPADM

## 2019-02-22 RX ORDER — MORPHINE SULFATE 4 MG/ML
4 INJECTION, SOLUTION INTRAMUSCULAR; INTRAVENOUS ONCE
Status: COMPLETED | OUTPATIENT
Start: 2019-02-22 | End: 2019-02-22

## 2019-02-22 RX ORDER — PANTOPRAZOLE SODIUM 40 MG/10ML
40 INJECTION, POWDER, LYOPHILIZED, FOR SOLUTION INTRAVENOUS DAILY
Status: DISCONTINUED | OUTPATIENT
Start: 2019-02-22 | End: 2019-02-25 | Stop reason: HOSPADM

## 2019-02-22 RX ORDER — LISINOPRIL 5 MG/1
5 TABLET ORAL
Status: DISCONTINUED | OUTPATIENT
Start: 2019-02-22 | End: 2019-02-25 | Stop reason: HOSPADM

## 2019-02-22 RX ORDER — TIZANIDINE 4 MG/1
4 TABLET ORAL EVERY 6 HOURS PRN
Status: DISCONTINUED | OUTPATIENT
Start: 2019-02-22 | End: 2019-02-25 | Stop reason: HOSPADM

## 2019-02-22 RX ORDER — DEXTROSE MONOHYDRATE 50 MG/ML
100 INJECTION, SOLUTION INTRAVENOUS PRN
Status: DISCONTINUED | OUTPATIENT
Start: 2019-02-22 | End: 2019-02-25 | Stop reason: HOSPADM

## 2019-02-22 RX ORDER — SODIUM CHLORIDE 0.9 % (FLUSH) 0.9 %
10 SYRINGE (ML) INJECTION EVERY 12 HOURS SCHEDULED
Status: DISCONTINUED | OUTPATIENT
Start: 2019-02-22 | End: 2019-02-25 | Stop reason: HOSPADM

## 2019-02-22 RX ORDER — DIPHENHYDRAMINE HYDROCHLORIDE 50 MG/ML
25 INJECTION INTRAMUSCULAR; INTRAVENOUS EVERY 6 HOURS PRN
Status: DISCONTINUED | OUTPATIENT
Start: 2019-02-22 | End: 2019-02-25 | Stop reason: HOSPADM

## 2019-02-22 RX ORDER — QUETIAPINE FUMARATE 100 MG/1
50 TABLET, FILM COATED ORAL NIGHTLY PRN
Status: ON HOLD | COMMUNITY
End: 2022-03-19 | Stop reason: HOSPADM

## 2019-02-22 RX ORDER — LORAZEPAM 1 MG/1
2 TABLET ORAL 3 TIMES DAILY
Status: DISCONTINUED | OUTPATIENT
Start: 2019-02-22 | End: 2019-02-25 | Stop reason: HOSPADM

## 2019-02-22 RX ADMIN — HYDROMORPHONE HYDROCHLORIDE 1 MG: 1 INJECTION, SOLUTION INTRAMUSCULAR; INTRAVENOUS; SUBCUTANEOUS at 19:56

## 2019-02-22 RX ADMIN — DILTIAZEM HYDROCHLORIDE 240 MG: 240 CAPSULE, COATED, EXTENDED RELEASE ORAL at 22:12

## 2019-02-22 RX ADMIN — SODIUM CHLORIDE 10 ML: 9 INJECTION, SOLUTION INTRAMUSCULAR; INTRAVENOUS; SUBCUTANEOUS at 22:10

## 2019-02-22 RX ADMIN — POTASSIUM CHLORIDE, DEXTROSE MONOHYDRATE AND SODIUM CHLORIDE: 150; 5; 450 INJECTION, SOLUTION INTRAVENOUS at 18:12

## 2019-02-22 RX ADMIN — LORAZEPAM 2 MG: 1 TABLET ORAL at 22:11

## 2019-02-22 RX ADMIN — Medication 10 ML: at 22:34

## 2019-02-22 RX ADMIN — MORPHINE SULFATE 4 MG: 4 INJECTION INTRAVENOUS at 17:23

## 2019-02-22 RX ADMIN — ONDANSETRON 4 MG: 2 INJECTION INTRAMUSCULAR; INTRAVENOUS at 13:15

## 2019-02-22 RX ADMIN — FONDAPARINUX SODIUM 10 MG: 10 INJECTION, SOLUTION SUBCUTANEOUS at 22:12

## 2019-02-22 RX ADMIN — QUETIAPINE FUMARATE 400 MG: 200 TABLET ORAL at 22:11

## 2019-02-22 RX ADMIN — MORPHINE SULFATE 4 MG: 4 INJECTION INTRAVENOUS at 15:04

## 2019-02-22 RX ADMIN — HYDROMORPHONE HYDROCHLORIDE 1 MG: 1 INJECTION, SOLUTION INTRAMUSCULAR; INTRAVENOUS; SUBCUTANEOUS at 23:15

## 2019-02-22 RX ADMIN — KETOROLAC TROMETHAMINE 15 MG: 30 INJECTION, SOLUTION INTRAMUSCULAR; INTRAVENOUS at 22:11

## 2019-02-22 RX ADMIN — PANTOPRAZOLE SODIUM 40 MG: 40 INJECTION, POWDER, FOR SOLUTION INTRAVENOUS at 22:10

## 2019-02-22 RX ADMIN — LISINOPRIL 5 MG: 5 TABLET ORAL at 22:11

## 2019-02-22 RX ADMIN — DIPHENHYDRAMINE HYDROCHLORIDE 25 MG: 50 INJECTION, SOLUTION INTRAMUSCULAR; INTRAVENOUS at 22:09

## 2019-02-22 RX ADMIN — HYDROCODONE BITARTRATE AND ACETAMINOPHEN 1 TABLET: 5; 325 TABLET ORAL at 13:15

## 2019-02-22 ASSESSMENT — PAIN SCALES - GENERAL
PAINLEVEL_OUTOF10: 7
PAINLEVEL_OUTOF10: 8
PAINLEVEL_OUTOF10: 7
PAINLEVEL_OUTOF10: 7
PAINLEVEL_OUTOF10: 6
PAINLEVEL_OUTOF10: 8

## 2019-02-22 ASSESSMENT — PAIN DESCRIPTION - PROGRESSION: CLINICAL_PROGRESSION: NOT CHANGED

## 2019-02-22 ASSESSMENT — PAIN DESCRIPTION - DESCRIPTORS: DESCRIPTORS: SHARP;PRESSURE

## 2019-02-22 ASSESSMENT — PAIN DESCRIPTION - ORIENTATION: ORIENTATION: UPPER;MID;RIGHT

## 2019-02-22 ASSESSMENT — PAIN DESCRIPTION - PAIN TYPE: TYPE: ACUTE PAIN

## 2019-02-22 ASSESSMENT — PAIN DESCRIPTION - LOCATION: LOCATION: ABDOMEN

## 2019-02-22 ASSESSMENT — PAIN DESCRIPTION - FREQUENCY: FREQUENCY: CONTINUOUS

## 2019-02-23 ENCOUNTER — APPOINTMENT (OUTPATIENT)
Dept: GENERAL RADIOLOGY | Age: 39
DRG: 389 | End: 2019-02-23
Payer: MEDICARE

## 2019-02-23 PROBLEM — Z98.84 HISTORY OF ROUX-EN-Y GASTRIC BYPASS: Chronic | Status: ACTIVE | Noted: 2019-02-23

## 2019-02-23 PROBLEM — M85.80 OSTEOPENIA: Status: ACTIVE | Noted: 2017-10-12

## 2019-02-23 PROBLEM — K22.70 BARRETT ESOPHAGUS: Chronic | Status: ACTIVE | Noted: 2019-02-23

## 2019-02-23 PROBLEM — R10.13 EPIGASTRIC PAIN: Status: ACTIVE | Noted: 2019-02-23

## 2019-02-23 LAB
ANION GAP SERPL CALCULATED.3IONS-SCNC: 9 MMOL/L (ref 9–17)
BUN BLDV-MCNC: 13 MG/DL (ref 6–20)
BUN/CREAT BLD: ABNORMAL (ref 9–20)
CALCIUM IONIZED: 1.2 MMOL/L (ref 1.13–1.33)
CALCIUM SERPL-MCNC: 8.2 MG/DL (ref 8.6–10.4)
CHLORIDE BLD-SCNC: 107 MMOL/L (ref 98–107)
CO2: 25 MMOL/L (ref 20–31)
CREAT SERPL-MCNC: 0.83 MG/DL (ref 0.5–0.9)
GFR AFRICAN AMERICAN: >60 ML/MIN
GFR NON-AFRICAN AMERICAN: >60 ML/MIN
GFR SERPL CREATININE-BSD FRML MDRD: ABNORMAL ML/MIN/{1.73_M2}
GFR SERPL CREATININE-BSD FRML MDRD: ABNORMAL ML/MIN/{1.73_M2}
GLUCOSE BLD-MCNC: 104 MG/DL (ref 65–105)
GLUCOSE BLD-MCNC: 109 MG/DL (ref 65–105)
GLUCOSE BLD-MCNC: 109 MG/DL (ref 65–105)
GLUCOSE BLD-MCNC: 119 MG/DL (ref 70–99)
GLUCOSE BLD-MCNC: 295 MG/DL (ref 65–105)
GLUCOSE BLD-MCNC: 85 MG/DL (ref 65–105)
HCT VFR BLD CALC: 38.8 % (ref 36–46)
HEMOGLOBIN: 12.6 G/DL (ref 12–16)
MAGNESIUM: 2 MG/DL (ref 1.6–2.6)
MCH RBC QN AUTO: 29.5 PG (ref 26–34)
MCHC RBC AUTO-ENTMCNC: 32.4 G/DL (ref 31–37)
MCV RBC AUTO: 91 FL (ref 80–100)
NRBC AUTOMATED: NORMAL PER 100 WBC
PDW BLD-RTO: 14.1 % (ref 11.5–14.9)
PHOSPHORUS: 5.2 MG/DL (ref 2.6–4.5)
PLATELET # BLD: 258 K/UL (ref 150–450)
PMV BLD AUTO: 9.7 FL (ref 6–12)
POTASSIUM SERPL-SCNC: 4.2 MMOL/L (ref 3.7–5.3)
RBC # BLD: 4.26 M/UL (ref 4–5.2)
SODIUM BLD-SCNC: 141 MMOL/L (ref 135–144)
WBC # BLD: 6.3 K/UL (ref 3.5–11)

## 2019-02-23 PROCEDURE — 6360000002 HC RX W HCPCS: Performed by: SURGERY

## 2019-02-23 PROCEDURE — 74249 FL UGI W SMALL BOWEL W DOUBLE CONTRAST: CPT

## 2019-02-23 PROCEDURE — 85027 COMPLETE CBC AUTOMATED: CPT

## 2019-02-23 PROCEDURE — 80048 BASIC METABOLIC PNL TOTAL CA: CPT

## 2019-02-23 PROCEDURE — 1200000000 HC SEMI PRIVATE

## 2019-02-23 PROCEDURE — 84100 ASSAY OF PHOSPHORUS: CPT

## 2019-02-23 PROCEDURE — 82947 ASSAY GLUCOSE BLOOD QUANT: CPT

## 2019-02-23 PROCEDURE — 6360000004 HC RX CONTRAST MEDICATION: Performed by: SURGERY

## 2019-02-23 PROCEDURE — 2500000003 HC RX 250 WO HCPCS: Performed by: SURGERY

## 2019-02-23 PROCEDURE — 83735 ASSAY OF MAGNESIUM: CPT

## 2019-02-23 PROCEDURE — 82330 ASSAY OF CALCIUM: CPT

## 2019-02-23 PROCEDURE — 6370000000 HC RX 637 (ALT 250 FOR IP): Performed by: SURGERY

## 2019-02-23 PROCEDURE — 36415 COLL VENOUS BLD VENIPUNCTURE: CPT

## 2019-02-23 RX ADMIN — POTASSIUM CHLORIDE, DEXTROSE MONOHYDRATE AND SODIUM CHLORIDE: 150; 5; 450 INJECTION, SOLUTION INTRAVENOUS at 01:37

## 2019-02-23 RX ADMIN — HYDROMORPHONE HYDROCHLORIDE 1 MG: 1 INJECTION, SOLUTION INTRAMUSCULAR; INTRAVENOUS; SUBCUTANEOUS at 09:46

## 2019-02-23 RX ADMIN — KETOROLAC TROMETHAMINE 15 MG: 30 INJECTION, SOLUTION INTRAMUSCULAR; INTRAVENOUS at 18:33

## 2019-02-23 RX ADMIN — LORAZEPAM 2 MG: 1 TABLET ORAL at 20:45

## 2019-02-23 RX ADMIN — HYDROMORPHONE HYDROCHLORIDE 1 MG: 1 INJECTION, SOLUTION INTRAMUSCULAR; INTRAVENOUS; SUBCUTANEOUS at 16:40

## 2019-02-23 RX ADMIN — KETOROLAC TROMETHAMINE 15 MG: 30 INJECTION, SOLUTION INTRAMUSCULAR; INTRAVENOUS at 06:36

## 2019-02-23 RX ADMIN — LISINOPRIL 5 MG: 5 TABLET ORAL at 16:48

## 2019-02-23 RX ADMIN — HYDROMORPHONE HYDROCHLORIDE 1 MG: 1 INJECTION, SOLUTION INTRAMUSCULAR; INTRAVENOUS; SUBCUTANEOUS at 12:30

## 2019-02-23 RX ADMIN — CHOLECALCIFEROL TAB 125 MCG (5000 UNIT) 10000 UNITS: 125 TAB at 16:39

## 2019-02-23 RX ADMIN — DIATRIZOATE MEGLUMINE AND DIATRIZOATE SODIUM 30 ML: 600; 100 SOLUTION ORAL; RECTAL at 12:30

## 2019-02-23 RX ADMIN — INSULIN GLARGINE 44 UNITS: 100 INJECTION, SOLUTION SUBCUTANEOUS at 21:06

## 2019-02-23 RX ADMIN — LORAZEPAM 2 MG: 1 TABLET ORAL at 09:48

## 2019-02-23 RX ADMIN — BARIUM SULFATE 176 G: 960 POWDER, FOR SUSPENSION ORAL at 12:30

## 2019-02-23 RX ADMIN — BARIUM SULFATE 340 G: 980 POWDER, FOR SUSPENSION ORAL at 12:30

## 2019-02-23 RX ADMIN — HYDROMORPHONE HYDROCHLORIDE 1 MG: 1 INJECTION, SOLUTION INTRAMUSCULAR; INTRAVENOUS; SUBCUTANEOUS at 02:49

## 2019-02-23 RX ADMIN — KETOROLAC TROMETHAMINE 15 MG: 30 INJECTION, SOLUTION INTRAMUSCULAR; INTRAVENOUS at 02:49

## 2019-02-23 RX ADMIN — FONDAPARINUX SODIUM 10 MG: 10 INJECTION, SOLUTION SUBCUTANEOUS at 16:39

## 2019-02-23 RX ADMIN — OXYCODONE AND ACETAMINOPHEN 2 TABLET: 5; 325 TABLET ORAL at 20:45

## 2019-02-23 RX ADMIN — HYDROMORPHONE HYDROCHLORIDE 1 MG: 1 INJECTION, SOLUTION INTRAMUSCULAR; INTRAVENOUS; SUBCUTANEOUS at 06:36

## 2019-02-23 RX ADMIN — QUETIAPINE FUMARATE 400 MG: 200 TABLET ORAL at 20:47

## 2019-02-23 RX ADMIN — DILTIAZEM HYDROCHLORIDE 240 MG: 240 CAPSULE, COATED, EXTENDED RELEASE ORAL at 16:39

## 2019-02-23 RX ADMIN — POTASSIUM CHLORIDE, DEXTROSE MONOHYDRATE AND SODIUM CHLORIDE: 150; 5; 450 INJECTION, SOLUTION INTRAVENOUS at 21:04

## 2019-02-23 ASSESSMENT — PAIN SCALES - GENERAL
PAINLEVEL_OUTOF10: 7
PAINLEVEL_OUTOF10: 7
PAINLEVEL_OUTOF10: 5
PAINLEVEL_OUTOF10: 6
PAINLEVEL_OUTOF10: 7
PAINLEVEL_OUTOF10: 4
PAINLEVEL_OUTOF10: 6
PAINLEVEL_OUTOF10: 7
PAINLEVEL_OUTOF10: 5
PAINLEVEL_OUTOF10: 5
PAINLEVEL_OUTOF10: 6
PAINLEVEL_OUTOF10: 6

## 2019-02-23 ASSESSMENT — PAIN DESCRIPTION - LOCATION: LOCATION: ABDOMEN

## 2019-02-23 ASSESSMENT — PAIN DESCRIPTION - PAIN TYPE: TYPE: ACUTE PAIN

## 2019-02-24 PROBLEM — N30.00 ACUTE CYSTITIS WITHOUT HEMATURIA: Status: ACTIVE | Noted: 2019-02-24

## 2019-02-24 PROBLEM — K56.609 SMALL BOWEL OBSTRUCTION (HCC): Status: RESOLVED | Noted: 2019-02-22 | Resolved: 2019-02-24

## 2019-02-24 LAB
CULTURE: ABNORMAL
CULTURE: ABNORMAL
GLUCOSE BLD-MCNC: 167 MG/DL (ref 65–105)
GLUCOSE BLD-MCNC: 170 MG/DL (ref 65–105)
GLUCOSE BLD-MCNC: 263 MG/DL (ref 65–105)
GLUCOSE BLD-MCNC: 316 MG/DL (ref 65–105)
GLUCOSE BLD-MCNC: 87 MG/DL (ref 65–105)
Lab: ABNORMAL
SPECIMEN DESCRIPTION: ABNORMAL

## 2019-02-24 PROCEDURE — 1200000000 HC SEMI PRIVATE

## 2019-02-24 PROCEDURE — 6360000002 HC RX W HCPCS: Performed by: FAMILY MEDICINE

## 2019-02-24 PROCEDURE — 6370000000 HC RX 637 (ALT 250 FOR IP): Performed by: FAMILY MEDICINE

## 2019-02-24 PROCEDURE — 6370000000 HC RX 637 (ALT 250 FOR IP): Performed by: SURGERY

## 2019-02-24 PROCEDURE — 2500000003 HC RX 250 WO HCPCS: Performed by: SURGERY

## 2019-02-24 PROCEDURE — 6360000002 HC RX W HCPCS: Performed by: SURGERY

## 2019-02-24 PROCEDURE — 2580000003 HC RX 258: Performed by: FAMILY MEDICINE

## 2019-02-24 PROCEDURE — C9113 INJ PANTOPRAZOLE SODIUM, VIA: HCPCS | Performed by: SURGERY

## 2019-02-24 PROCEDURE — 2580000003 HC RX 258: Performed by: SURGERY

## 2019-02-24 RX ORDER — SULFAMETHOXAZOLE AND TRIMETHOPRIM 800; 160 MG/1; MG/1
1 TABLET ORAL EVERY 12 HOURS SCHEDULED
Status: DISCONTINUED | OUTPATIENT
Start: 2019-02-24 | End: 2019-02-25 | Stop reason: HOSPADM

## 2019-02-24 RX ORDER — POLYETHYLENE GLYCOL 3350 17 G/17G
17 POWDER, FOR SOLUTION ORAL 2 TIMES DAILY
Qty: 527 G | Refills: 1 | Status: SHIPPED | OUTPATIENT
Start: 2019-02-24 | End: 2019-03-26

## 2019-02-24 RX ORDER — PANTOPRAZOLE SODIUM 40 MG/1
40 TABLET, DELAYED RELEASE ORAL DAILY
Qty: 30 TABLET | Refills: 3 | Status: SHIPPED | OUTPATIENT
Start: 2019-02-24 | End: 2019-12-05

## 2019-02-24 RX ORDER — POLYETHYLENE GLYCOL 3350 17 G/17G
17 POWDER, FOR SOLUTION ORAL 2 TIMES DAILY
Status: DISCONTINUED | OUTPATIENT
Start: 2019-02-24 | End: 2019-02-25 | Stop reason: HOSPADM

## 2019-02-24 RX ADMIN — CHOLECALCIFEROL TAB 125 MCG (5000 UNIT) 10000 UNITS: 125 TAB at 08:32

## 2019-02-24 RX ADMIN — CEFTRIAXONE SODIUM 1 G: 1 INJECTION, POWDER, FOR SOLUTION INTRAMUSCULAR; INTRAVENOUS at 13:05

## 2019-02-24 RX ADMIN — LORAZEPAM 2 MG: 1 TABLET ORAL at 15:15

## 2019-02-24 RX ADMIN — KETOROLAC TROMETHAMINE 15 MG: 30 INJECTION, SOLUTION INTRAMUSCULAR; INTRAVENOUS at 03:02

## 2019-02-24 RX ADMIN — OXYCODONE AND ACETAMINOPHEN 2 TABLET: 5; 325 TABLET ORAL at 10:16

## 2019-02-24 RX ADMIN — SULFAMETHOXAZOLE AND TRIMETHOPRIM 1 TABLET: 800; 160 TABLET ORAL at 21:11

## 2019-02-24 RX ADMIN — INSULIN GLARGINE 44 UNITS: 100 INJECTION, SOLUTION SUBCUTANEOUS at 21:13

## 2019-02-24 RX ADMIN — FONDAPARINUX SODIUM 10 MG: 10 INJECTION, SOLUTION SUBCUTANEOUS at 08:32

## 2019-02-24 RX ADMIN — KETOROLAC TROMETHAMINE 15 MG: 30 INJECTION, SOLUTION INTRAMUSCULAR; INTRAVENOUS at 08:31

## 2019-02-24 RX ADMIN — SODIUM CHLORIDE 10 ML: 9 INJECTION, SOLUTION INTRAMUSCULAR; INTRAVENOUS; SUBCUTANEOUS at 08:32

## 2019-02-24 RX ADMIN — PANTOPRAZOLE SODIUM 40 MG: 40 INJECTION, POWDER, FOR SOLUTION INTRAVENOUS at 08:32

## 2019-02-24 RX ADMIN — OXYCODONE AND ACETAMINOPHEN 2 TABLET: 5; 325 TABLET ORAL at 06:10

## 2019-02-24 RX ADMIN — KETOROLAC TROMETHAMINE 15 MG: 30 INJECTION, SOLUTION INTRAMUSCULAR; INTRAVENOUS at 13:05

## 2019-02-24 RX ADMIN — QUETIAPINE FUMARATE 400 MG: 200 TABLET ORAL at 21:12

## 2019-02-24 RX ADMIN — POTASSIUM CHLORIDE, DEXTROSE MONOHYDRATE AND SODIUM CHLORIDE: 150; 5; 450 INJECTION, SOLUTION INTRAVENOUS at 03:06

## 2019-02-24 RX ADMIN — LISINOPRIL 5 MG: 5 TABLET ORAL at 15:15

## 2019-02-24 RX ADMIN — LORAZEPAM 2 MG: 1 TABLET ORAL at 21:11

## 2019-02-24 RX ADMIN — DIPHENHYDRAMINE HYDROCHLORIDE 25 MG: 50 INJECTION, SOLUTION INTRAMUSCULAR; INTRAVENOUS at 13:05

## 2019-02-24 RX ADMIN — LORAZEPAM 2 MG: 1 TABLET ORAL at 08:32

## 2019-02-24 RX ADMIN — OXYCODONE AND ACETAMINOPHEN 2 TABLET: 5; 325 TABLET ORAL at 23:12

## 2019-02-24 RX ADMIN — OXYCODONE AND ACETAMINOPHEN 2 TABLET: 5; 325 TABLET ORAL at 19:09

## 2019-02-24 RX ADMIN — OXYCODONE AND ACETAMINOPHEN 2 TABLET: 5; 325 TABLET ORAL at 14:37

## 2019-02-24 RX ADMIN — DILTIAZEM HYDROCHLORIDE 240 MG: 240 CAPSULE, COATED, EXTENDED RELEASE ORAL at 08:32

## 2019-02-24 ASSESSMENT — PAIN SCALES - GENERAL
PAINLEVEL_OUTOF10: 7
PAINLEVEL_OUTOF10: 4
PAINLEVEL_OUTOF10: 6
PAINLEVEL_OUTOF10: 4
PAINLEVEL_OUTOF10: 5
PAINLEVEL_OUTOF10: 7
PAINLEVEL_OUTOF10: 6
PAINLEVEL_OUTOF10: 7
PAINLEVEL_OUTOF10: 4
PAINLEVEL_OUTOF10: 7
PAINLEVEL_OUTOF10: 7

## 2019-02-24 ASSESSMENT — PAIN DESCRIPTION - ORIENTATION: ORIENTATION: RIGHT;MID

## 2019-02-24 ASSESSMENT — PAIN DESCRIPTION - PAIN TYPE: TYPE: ACUTE PAIN

## 2019-02-24 ASSESSMENT — PAIN DESCRIPTION - LOCATION: LOCATION: ABDOMEN

## 2019-02-25 VITALS
HEART RATE: 87 BPM | BODY MASS INDEX: 44.41 KG/M2 | DIASTOLIC BLOOD PRESSURE: 76 MMHG | WEIGHT: 293 LBS | HEIGHT: 68 IN | RESPIRATION RATE: 16 BRPM | OXYGEN SATURATION: 100 % | TEMPERATURE: 99.3 F | SYSTOLIC BLOOD PRESSURE: 123 MMHG

## 2019-02-25 LAB
GLUCOSE BLD-MCNC: 142 MG/DL (ref 65–105)
GLUCOSE BLD-MCNC: 174 MG/DL (ref 65–105)

## 2019-02-25 PROCEDURE — 82947 ASSAY GLUCOSE BLOOD QUANT: CPT

## 2019-02-25 PROCEDURE — 2580000003 HC RX 258: Performed by: FAMILY MEDICINE

## 2019-02-25 PROCEDURE — 2500000003 HC RX 250 WO HCPCS: Performed by: SURGERY

## 2019-02-25 PROCEDURE — C9113 INJ PANTOPRAZOLE SODIUM, VIA: HCPCS | Performed by: SURGERY

## 2019-02-25 PROCEDURE — 2580000003 HC RX 258: Performed by: SURGERY

## 2019-02-25 PROCEDURE — 6360000002 HC RX W HCPCS: Performed by: FAMILY MEDICINE

## 2019-02-25 PROCEDURE — 6360000002 HC RX W HCPCS: Performed by: SURGERY

## 2019-02-25 PROCEDURE — 6370000000 HC RX 637 (ALT 250 FOR IP): Performed by: SURGERY

## 2019-02-25 PROCEDURE — 6370000000 HC RX 637 (ALT 250 FOR IP): Performed by: FAMILY MEDICINE

## 2019-02-25 RX ORDER — SULFAMETHOXAZOLE AND TRIMETHOPRIM 800; 160 MG/1; MG/1
1 TABLET ORAL EVERY 12 HOURS SCHEDULED
Qty: 14 TABLET | Refills: 0 | Status: SHIPPED | OUTPATIENT
Start: 2019-02-25 | End: 2019-03-04

## 2019-02-25 RX ORDER — TRAMADOL HYDROCHLORIDE 50 MG/1
50 TABLET ORAL EVERY 4 HOURS PRN
Qty: 10 TABLET | Refills: 0 | Status: SHIPPED | OUTPATIENT
Start: 2019-02-25 | End: 2019-02-28

## 2019-02-25 RX ORDER — ONDANSETRON 4 MG/1
4 TABLET, FILM COATED ORAL EVERY 8 HOURS PRN
Qty: 15 TABLET | Refills: 0 | Status: SHIPPED | OUTPATIENT
Start: 2019-02-25 | End: 2020-04-27

## 2019-02-25 RX ADMIN — CEFTRIAXONE SODIUM 1 G: 1 INJECTION, POWDER, FOR SOLUTION INTRAMUSCULAR; INTRAVENOUS at 10:55

## 2019-02-25 RX ADMIN — SODIUM CHLORIDE 10 ML: 9 INJECTION, SOLUTION INTRAMUSCULAR; INTRAVENOUS; SUBCUTANEOUS at 08:36

## 2019-02-25 RX ADMIN — OXYCODONE AND ACETAMINOPHEN 2 TABLET: 5; 325 TABLET ORAL at 03:26

## 2019-02-25 RX ADMIN — POTASSIUM CHLORIDE, DEXTROSE MONOHYDRATE AND SODIUM CHLORIDE: 150; 5; 450 INJECTION, SOLUTION INTRAVENOUS at 00:58

## 2019-02-25 RX ADMIN — PANTOPRAZOLE SODIUM 40 MG: 40 INJECTION, POWDER, FOR SOLUTION INTRAVENOUS at 08:36

## 2019-02-25 RX ADMIN — CHOLECALCIFEROL TAB 125 MCG (5000 UNIT) 10000 UNITS: 125 TAB at 08:36

## 2019-02-25 RX ADMIN — SULFAMETHOXAZOLE AND TRIMETHOPRIM 1 TABLET: 800; 160 TABLET ORAL at 08:36

## 2019-02-25 RX ADMIN — OXYCODONE AND ACETAMINOPHEN 2 TABLET: 5; 325 TABLET ORAL at 12:23

## 2019-02-25 RX ADMIN — LORAZEPAM 2 MG: 1 TABLET ORAL at 08:36

## 2019-02-25 RX ADMIN — DIPHENHYDRAMINE HYDROCHLORIDE 25 MG: 50 INJECTION, SOLUTION INTRAMUSCULAR; INTRAVENOUS at 00:54

## 2019-02-25 RX ADMIN — OXYCODONE AND ACETAMINOPHEN 2 TABLET: 5; 325 TABLET ORAL at 08:36

## 2019-02-25 RX ADMIN — DILTIAZEM HYDROCHLORIDE 240 MG: 240 CAPSULE, COATED, EXTENDED RELEASE ORAL at 08:36

## 2019-02-25 RX ADMIN — POTASSIUM CHLORIDE, DEXTROSE MONOHYDRATE AND SODIUM CHLORIDE: 150; 5; 450 INJECTION, SOLUTION INTRAVENOUS at 09:20

## 2019-02-25 RX ADMIN — FONDAPARINUX SODIUM 10 MG: 10 INJECTION, SOLUTION SUBCUTANEOUS at 08:37

## 2019-02-25 ASSESSMENT — PAIN SCALES - GENERAL
PAINLEVEL_OUTOF10: 7
PAINLEVEL_OUTOF10: 7
PAINLEVEL_OUTOF10: 6
PAINLEVEL_OUTOF10: 6

## 2019-03-09 ENCOUNTER — APPOINTMENT (OUTPATIENT)
Dept: CT IMAGING | Age: 39
End: 2019-03-09
Payer: MEDICARE

## 2019-03-09 ENCOUNTER — HOSPITAL ENCOUNTER (EMERGENCY)
Age: 39
Discharge: HOME | End: 2019-03-09
Attending: EMERGENCY MEDICINE
Payer: MEDICARE

## 2019-03-09 ENCOUNTER — APPOINTMENT (OUTPATIENT)
Dept: GENERAL RADIOLOGY | Age: 39
End: 2019-03-09
Payer: MEDICARE

## 2019-03-09 VITALS
HEIGHT: 68 IN | SYSTOLIC BLOOD PRESSURE: 114 MMHG | OXYGEN SATURATION: 100 % | HEART RATE: 97 BPM | RESPIRATION RATE: 17 BRPM | DIASTOLIC BLOOD PRESSURE: 82 MMHG | BODY MASS INDEX: 44.41 KG/M2 | TEMPERATURE: 98.1 F | WEIGHT: 293 LBS

## 2019-03-09 DIAGNOSIS — R53.1 GENERAL WEAKNESS: Primary | ICD-10-CM

## 2019-03-09 DIAGNOSIS — N30.00 ACUTE CYSTITIS WITHOUT HEMATURIA: ICD-10-CM

## 2019-03-09 LAB
-: ABNORMAL
ALBUMIN SERPL-MCNC: 3.3 G/DL (ref 3.5–5.2)
ALBUMIN/GLOBULIN RATIO: ABNORMAL (ref 1–2.5)
ALP BLD-CCNC: 128 U/L (ref 35–104)
ALT SERPL-CCNC: 14 U/L (ref 5–33)
AMORPHOUS: ABNORMAL
ANION GAP SERPL CALCULATED.3IONS-SCNC: 11 MMOL/L (ref 9–17)
AST SERPL-CCNC: 9 U/L
BACTERIA: ABNORMAL
BILIRUB SERPL-MCNC: 0.15 MG/DL (ref 0.3–1.2)
BILIRUBIN URINE: NEGATIVE
BUN BLDV-MCNC: 7 MG/DL (ref 6–20)
BUN/CREAT BLD: ABNORMAL (ref 9–20)
CALCIUM SERPL-MCNC: 9.1 MG/DL (ref 8.6–10.4)
CASTS UA: ABNORMAL /LPF
CHLORIDE BLD-SCNC: 107 MMOL/L (ref 98–107)
CO2: 23 MMOL/L (ref 20–31)
COLOR: YELLOW
COMMENT UA: ABNORMAL
CREAT SERPL-MCNC: 0.42 MG/DL (ref 0.5–0.9)
CRYSTALS, UA: ABNORMAL /HPF
DIRECT EXAM: NORMAL
EPITHELIAL CELLS UA: ABNORMAL /HPF
GFR AFRICAN AMERICAN: >60 ML/MIN
GFR NON-AFRICAN AMERICAN: >60 ML/MIN
GFR SERPL CREATININE-BSD FRML MDRD: ABNORMAL ML/MIN/{1.73_M2}
GFR SERPL CREATININE-BSD FRML MDRD: ABNORMAL ML/MIN/{1.73_M2}
GLUCOSE BLD-MCNC: 142 MG/DL (ref 70–99)
GLUCOSE URINE: ABNORMAL
HCT VFR BLD CALC: 39.5 % (ref 36–46)
HEMOGLOBIN: 12.8 G/DL (ref 12–16)
INR BLD: 1
KETONES, URINE: NEGATIVE
LEUKOCYTE ESTERASE, URINE: ABNORMAL
Lab: NORMAL
MCH RBC QN AUTO: 29 PG (ref 26–34)
MCHC RBC AUTO-ENTMCNC: 32.3 G/DL (ref 31–37)
MCV RBC AUTO: 89.8 FL (ref 80–100)
MUCUS: ABNORMAL
NITRITE, URINE: NEGATIVE
NRBC AUTOMATED: NORMAL PER 100 WBC
OTHER OBSERVATIONS UA: ABNORMAL
PDW BLD-RTO: 14 % (ref 11.5–14.9)
PH UA: 5.5 (ref 5–8)
PLATELET # BLD: 311 K/UL (ref 150–450)
PMV BLD AUTO: 9.7 FL (ref 6–12)
POTASSIUM SERPL-SCNC: 3.8 MMOL/L (ref 3.7–5.3)
POTASSIUM SERPL-SCNC: ABNORMAL MMOL/L (ref 3.7–5.3)
PROTEIN UA: NEGATIVE
PROTHROMBIN TIME: 12.7 SEC (ref 11.8–14.6)
RBC # BLD: 4.4 M/UL (ref 4–5.2)
RBC UA: ABNORMAL /HPF
RENAL EPITHELIAL, UA: ABNORMAL /HPF
SODIUM BLD-SCNC: 141 MMOL/L (ref 135–144)
SPECIFIC GRAVITY UA: 1.02 (ref 1–1.03)
SPECIMEN DESCRIPTION: NORMAL
TOTAL CK: 48 U/L (ref 26–192)
TOTAL PROTEIN: 6.4 G/DL (ref 6.4–8.3)
TRICHOMONAS: ABNORMAL
TROPONIN INTERP: NORMAL
TROPONIN T: NORMAL NG/ML
TROPONIN, HIGH SENSITIVITY: 9 NG/L (ref 0–14)
TURBIDITY: ABNORMAL
URINE HGB: NEGATIVE
UROBILINOGEN, URINE: NORMAL
WBC # BLD: 5.1 K/UL (ref 3.5–11)
WBC UA: ABNORMAL /HPF
YEAST: ABNORMAL

## 2019-03-09 PROCEDURE — 84132 ASSAY OF SERUM POTASSIUM: CPT

## 2019-03-09 PROCEDURE — 96372 THER/PROPH/DIAG INJ SC/IM: CPT

## 2019-03-09 PROCEDURE — 87804 INFLUENZA ASSAY W/OPTIC: CPT

## 2019-03-09 PROCEDURE — 85610 PROTHROMBIN TIME: CPT

## 2019-03-09 PROCEDURE — 82550 ASSAY OF CK (CPK): CPT

## 2019-03-09 PROCEDURE — 85027 COMPLETE CBC AUTOMATED: CPT

## 2019-03-09 PROCEDURE — 81001 URINALYSIS AUTO W/SCOPE: CPT

## 2019-03-09 PROCEDURE — 6360000002 HC RX W HCPCS: Performed by: EMERGENCY MEDICINE

## 2019-03-09 PROCEDURE — 80053 COMPREHEN METABOLIC PANEL: CPT

## 2019-03-09 PROCEDURE — 70450 CT HEAD/BRAIN W/O DYE: CPT

## 2019-03-09 PROCEDURE — 6370000000 HC RX 637 (ALT 250 FOR IP): Performed by: EMERGENCY MEDICINE

## 2019-03-09 PROCEDURE — 99285 EMERGENCY DEPT VISIT HI MDM: CPT

## 2019-03-09 PROCEDURE — 72040 X-RAY EXAM NECK SPINE 2-3 VW: CPT

## 2019-03-09 PROCEDURE — 36415 COLL VENOUS BLD VENIPUNCTURE: CPT

## 2019-03-09 PROCEDURE — 84484 ASSAY OF TROPONIN QUANT: CPT

## 2019-03-09 RX ORDER — CEPHALEXIN 500 MG/1
500 CAPSULE ORAL 3 TIMES DAILY
Qty: 15 CAPSULE | Refills: 0 | Status: SHIPPED | OUTPATIENT
Start: 2019-03-09 | End: 2019-03-14

## 2019-03-09 RX ORDER — CEFTRIAXONE 1 G/1
1 INJECTION, POWDER, FOR SOLUTION INTRAMUSCULAR; INTRAVENOUS ONCE
Status: COMPLETED | OUTPATIENT
Start: 2019-03-09 | End: 2019-03-09

## 2019-03-09 RX ORDER — IBUPROFEN 800 MG/1
800 TABLET ORAL ONCE
Status: DISCONTINUED | OUTPATIENT
Start: 2019-03-09 | End: 2019-03-09 | Stop reason: HOSPADM

## 2019-03-09 RX ORDER — ACETAMINOPHEN 500 MG
1000 TABLET ORAL ONCE
Status: COMPLETED | OUTPATIENT
Start: 2019-03-09 | End: 2019-03-09

## 2019-03-09 RX ADMIN — CEFTRIAXONE SODIUM 1 G: 1 INJECTION, POWDER, FOR SOLUTION INTRAMUSCULAR; INTRAVENOUS at 18:44

## 2019-03-09 RX ADMIN — ACETAMINOPHEN 1000 MG: 500 TABLET, FILM COATED ORAL at 18:39

## 2019-03-09 ASSESSMENT — ENCOUNTER SYMPTOMS
BACK PAIN: 0
SHORTNESS OF BREATH: 0
ABDOMINAL PAIN: 0

## 2019-03-09 ASSESSMENT — PAIN SCALES - GENERAL: PAINLEVEL_OUTOF10: 7

## 2019-05-07 ENCOUNTER — HOSPITAL ENCOUNTER (OUTPATIENT)
Age: 39
Setting detail: SPECIMEN
Discharge: HOME | End: 2019-05-07
Payer: MEDICARE

## 2019-05-07 LAB
ANION GAP SERPL CALCULATED.3IONS-SCNC: 15 MMOL/L (ref 9–17)
BUN BLDV-MCNC: 11 MG/DL (ref 6–20)
BUN/CREAT BLD: ABNORMAL (ref 9–20)
CALCIUM SERPL-MCNC: 9.3 MG/DL (ref 8.6–10.4)
CHLORIDE BLD-SCNC: 98 MMOL/L (ref 98–107)
CO2: 22 MMOL/L (ref 20–31)
CREAT SERPL-MCNC: 0.62 MG/DL (ref 0.5–0.9)
GFR AFRICAN AMERICAN: >60 ML/MIN
GFR NON-AFRICAN AMERICAN: >60 ML/MIN
GFR SERPL CREATININE-BSD FRML MDRD: ABNORMAL ML/MIN/{1.73_M2}
GFR SERPL CREATININE-BSD FRML MDRD: ABNORMAL ML/MIN/{1.73_M2}
GLUCOSE BLD-MCNC: 283 MG/DL (ref 70–99)
HCT VFR BLD CALC: 45.4 % (ref 36.3–47.1)
HEMOGLOBIN: 14.3 G/DL (ref 11.9–15.1)
MCH RBC QN AUTO: 29.6 PG (ref 25.2–33.5)
MCHC RBC AUTO-ENTMCNC: 31.5 G/DL (ref 28.4–34.8)
MCV RBC AUTO: 94 FL (ref 82.6–102.9)
NRBC AUTOMATED: 0 PER 100 WBC
PDW BLD-RTO: 12.7 % (ref 11.8–14.4)
PLATELET # BLD: NORMAL K/UL (ref 138–453)
PLATELET, FLUORESCENCE: 274 K/UL (ref 138–453)
PLATELET, IMMATURE FRACTION: 9.6 % (ref 1.1–10.3)
PMV BLD AUTO: NORMAL FL (ref 8.1–13.5)
POTASSIUM SERPL-SCNC: 5 MMOL/L (ref 3.7–5.3)
RBC # BLD: 4.83 M/UL (ref 3.95–5.11)
SODIUM BLD-SCNC: 135 MMOL/L (ref 135–144)
WBC # BLD: 8.1 K/UL (ref 3.5–11.3)

## 2019-05-07 PROCEDURE — 85055 RETICULATED PLATELET ASSAY: CPT

## 2019-05-07 PROCEDURE — P9603 ONE-WAY ALLOW PRORATED MILES: HCPCS

## 2019-05-07 PROCEDURE — 80048 BASIC METABOLIC PNL TOTAL CA: CPT

## 2019-05-07 PROCEDURE — 85027 COMPLETE CBC AUTOMATED: CPT

## 2019-05-07 PROCEDURE — 36415 COLL VENOUS BLD VENIPUNCTURE: CPT

## 2019-06-19 ENCOUNTER — HOSPITAL ENCOUNTER (EMERGENCY)
Age: 39
Discharge: HOME | End: 2019-06-20
Attending: EMERGENCY MEDICINE
Payer: MEDICARE

## 2019-06-19 ENCOUNTER — APPOINTMENT (OUTPATIENT)
Dept: CT IMAGING | Age: 39
End: 2019-06-19
Payer: MEDICARE

## 2019-06-19 VITALS
WEIGHT: 293 LBS | RESPIRATION RATE: 19 BRPM | DIASTOLIC BLOOD PRESSURE: 75 MMHG | HEIGHT: 65 IN | OXYGEN SATURATION: 93 % | TEMPERATURE: 98.4 F | HEART RATE: 80 BPM | BODY MASS INDEX: 48.82 KG/M2 | SYSTOLIC BLOOD PRESSURE: 120 MMHG

## 2019-06-19 DIAGNOSIS — R07.1 CHEST PAIN ON BREATHING: Primary | ICD-10-CM

## 2019-06-19 DIAGNOSIS — M79.605 LEFT LEG PAIN: ICD-10-CM

## 2019-06-19 LAB
ABSOLUTE EOS #: 0.28 K/UL (ref 0–0.44)
ABSOLUTE IMMATURE GRANULOCYTE: 0.03 K/UL (ref 0–0.3)
ABSOLUTE LYMPH #: 1.94 K/UL (ref 1.1–3.7)
ABSOLUTE MONO #: 0.43 K/UL (ref 0.1–1.2)
ANION GAP SERPL CALCULATED.3IONS-SCNC: 8 MMOL/L (ref 9–17)
BASOPHILS # BLD: 1 % (ref 0–2)
BASOPHILS ABSOLUTE: 0.04 K/UL (ref 0–0.2)
BUN BLDV-MCNC: 11 MG/DL (ref 6–20)
BUN/CREAT BLD: ABNORMAL (ref 9–20)
CALCIUM SERPL-MCNC: 8.3 MG/DL (ref 8.6–10.4)
CHLORIDE BLD-SCNC: 102 MMOL/L (ref 98–107)
CO2: 25 MMOL/L (ref 20–31)
CREAT SERPL-MCNC: 0.64 MG/DL (ref 0.5–0.9)
DIFFERENTIAL TYPE: ABNORMAL
EOSINOPHILS RELATIVE PERCENT: 5 % (ref 1–4)
GFR AFRICAN AMERICAN: >60 ML/MIN
GFR NON-AFRICAN AMERICAN: >60 ML/MIN
GFR SERPL CREATININE-BSD FRML MDRD: ABNORMAL ML/MIN/{1.73_M2}
GFR SERPL CREATININE-BSD FRML MDRD: ABNORMAL ML/MIN/{1.73_M2}
GLUCOSE BLD-MCNC: 131 MG/DL (ref 70–99)
HCT VFR BLD CALC: 38.1 % (ref 36.3–47.1)
HEMOGLOBIN: 12 G/DL (ref 11.9–15.1)
IMMATURE GRANULOCYTES: 1 %
LYMPHOCYTES # BLD: 36 % (ref 24–43)
MCH RBC QN AUTO: 29.6 PG (ref 25.2–33.5)
MCHC RBC AUTO-ENTMCNC: 31.5 G/DL (ref 28.4–34.8)
MCV RBC AUTO: 93.8 FL (ref 82.6–102.9)
MONOCYTES # BLD: 8 % (ref 3–12)
NRBC AUTOMATED: 0 PER 100 WBC
PDW BLD-RTO: 13.9 % (ref 11.8–14.4)
PLATELET # BLD: ABNORMAL K/UL (ref 138–453)
PLATELET ESTIMATE: ABNORMAL
PLATELET, FLUORESCENCE: 186 K/UL (ref 138–453)
PLATELET, IMMATURE FRACTION: 8.5 % (ref 1.1–10.3)
PMV BLD AUTO: ABNORMAL FL (ref 8.1–13.5)
POTASSIUM SERPL-SCNC: 4.9 MMOL/L (ref 3.7–5.3)
RBC # BLD: 4.06 M/UL (ref 3.95–5.11)
RBC # BLD: ABNORMAL 10*6/UL
SEG NEUTROPHILS: 49 % (ref 36–65)
SEGMENTED NEUTROPHILS ABSOLUTE COUNT: 2.62 K/UL (ref 1.5–8.1)
SODIUM BLD-SCNC: 135 MMOL/L (ref 135–144)
WBC # BLD: 5.3 K/UL (ref 3.5–11.3)
WBC # BLD: ABNORMAL 10*3/UL

## 2019-06-19 PROCEDURE — 83874 ASSAY OF MYOGLOBIN: CPT

## 2019-06-19 PROCEDURE — 6360000002 HC RX W HCPCS: Performed by: STUDENT IN AN ORGANIZED HEALTH CARE EDUCATION/TRAINING PROGRAM

## 2019-06-19 PROCEDURE — 85025 COMPLETE CBC W/AUTO DIFF WBC: CPT

## 2019-06-19 PROCEDURE — 93005 ELECTROCARDIOGRAM TRACING: CPT | Performed by: STUDENT IN AN ORGANIZED HEALTH CARE EDUCATION/TRAINING PROGRAM

## 2019-06-19 PROCEDURE — 80048 BASIC METABOLIC PNL TOTAL CA: CPT

## 2019-06-19 PROCEDURE — 71260 CT THORAX DX C+: CPT

## 2019-06-19 PROCEDURE — 99285 EMERGENCY DEPT VISIT HI MDM: CPT

## 2019-06-19 PROCEDURE — 84484 ASSAY OF TROPONIN QUANT: CPT

## 2019-06-19 PROCEDURE — 96374 THER/PROPH/DIAG INJ IV PUSH: CPT

## 2019-06-19 PROCEDURE — 6360000004 HC RX CONTRAST MEDICATION: Performed by: STUDENT IN AN ORGANIZED HEALTH CARE EDUCATION/TRAINING PROGRAM

## 2019-06-19 PROCEDURE — 85055 RETICULATED PLATELET ASSAY: CPT

## 2019-06-19 PROCEDURE — 93005 ELECTROCARDIOGRAM TRACING: CPT

## 2019-06-19 RX ORDER — MORPHINE SULFATE 4 MG/ML
2 INJECTION, SOLUTION INTRAMUSCULAR; INTRAVENOUS ONCE
Status: COMPLETED | OUTPATIENT
Start: 2019-06-19 | End: 2019-06-19

## 2019-06-19 RX ORDER — ONDANSETRON 2 MG/ML
4 INJECTION INTRAMUSCULAR; INTRAVENOUS ONCE
Status: COMPLETED | OUTPATIENT
Start: 2019-06-20 | End: 2019-06-20

## 2019-06-19 RX ADMIN — IOPAMIDOL 75 ML: 755 INJECTION, SOLUTION INTRAVENOUS at 23:16

## 2019-06-19 RX ADMIN — MORPHINE SULFATE 2 MG: 4 INJECTION INTRAVENOUS at 23:24

## 2019-06-19 ASSESSMENT — PAIN DESCRIPTION - LOCATION: LOCATION: LEG

## 2019-06-19 ASSESSMENT — PAIN SCALES - GENERAL
PAINLEVEL_OUTOF10: 8
PAINLEVEL_OUTOF10: 8

## 2019-06-19 ASSESSMENT — PAIN DESCRIPTION - DESCRIPTORS: DESCRIPTORS: CRAMPING

## 2019-06-19 ASSESSMENT — PAIN DESCRIPTION - ORIENTATION: ORIENTATION: LEFT

## 2019-06-19 ASSESSMENT — PAIN DESCRIPTION - PAIN TYPE: TYPE: ACUTE PAIN

## 2019-06-20 LAB
EKG ATRIAL RATE: 93 BPM
EKG P AXIS: 57 DEGREES
EKG P-R INTERVAL: 152 MS
EKG Q-T INTERVAL: 382 MS
EKG QRS DURATION: 96 MS
EKG QTC CALCULATION (BAZETT): 474 MS
EKG R AXIS: 53 DEGREES
EKG T AXIS: 10 DEGREES
EKG VENTRICULAR RATE: 93 BPM
MYOGLOBIN: <21 NG/ML (ref 25–58)
TROPONIN INTERP: ABNORMAL
TROPONIN INTERP: NORMAL
TROPONIN T: ABNORMAL NG/ML
TROPONIN T: NORMAL NG/ML
TROPONIN, HIGH SENSITIVITY: <6 NG/L (ref 0–14)
TROPONIN, HIGH SENSITIVITY: <6 NG/L (ref 0–14)

## 2019-06-20 PROCEDURE — 6360000002 HC RX W HCPCS: Performed by: STUDENT IN AN ORGANIZED HEALTH CARE EDUCATION/TRAINING PROGRAM

## 2019-06-20 PROCEDURE — 96375 TX/PRO/DX INJ NEW DRUG ADDON: CPT

## 2019-06-20 PROCEDURE — 93010 ELECTROCARDIOGRAM REPORT: CPT | Performed by: INTERNAL MEDICINE

## 2019-06-20 PROCEDURE — 84484 ASSAY OF TROPONIN QUANT: CPT

## 2019-06-20 RX ADMIN — ONDANSETRON 4 MG: 2 INJECTION INTRAMUSCULAR; INTRAVENOUS at 00:05

## 2019-06-20 ASSESSMENT — ENCOUNTER SYMPTOMS
STRIDOR: 0
VOMITING: 0
DIARRHEA: 0
CHEST TIGHTNESS: 1
NAUSEA: 0
ABDOMINAL DISTENTION: 0
EYE REDNESS: 0
SHORTNESS OF BREATH: 0
CONSTIPATION: 0
EYE DISCHARGE: 0
ABDOMINAL PAIN: 0
APNEA: 0
WHEEZING: 0
COUGH: 0
SORE THROAT: 0

## 2019-06-20 NOTE — ED PROVIDER NOTES
Pulmonary arteries are adequately opacified for evaluation. No evidence of intraluminal filling defect to suggest pulmonary embolism. Main pulmonary artery is normal in caliber. No right ventricular strain. Mediastinum: No evidence of mediastinal lymphadenopathy. Cardiomegaly. No pericardial effusion. There is no acute abnormality of the thoracic aorta. Lungs/pleura: Respiratory motion. Bilateral patchy ground-glass opacities. No pleural effusion or pneumothorax. No pulmonary mass or consolidation. Bilateral bronchial wall thickening. Upper Abdomen: Partially visualized ventral hernia containing mesenteric fat and in the portion of the cold. Postsurgical changes of gastric bypass. Partially visualized postsurgical changes in the left hepatic lobe. Soft Tissues/Bones: Multilevel degenerative disc disease. No acute pulmonary thromboembolic disease. No pulmonary hypertension or right ventricular strain. Cardiomegaly. Bilateral patchy ground-glass pulmonary opacities may relate to mild pulmonary edema or an atypical infectious process. No pulmonary mass or consolidation. RECENT VITALS:     Temp: 98.4 °F (36.9 °C),  Pulse: 80, Resp: 19, BP: 120/75, SpO2: 93 %    This patient is a 35 y.o. Female with a history of antiphospholipid syndrome, diabetes, hypertension who presents with midsternal chest pain nonradiating that squeezing in nature. Patient has had total DVTs and PEs in the past with similar pain. CT PE ordered negative for acute pulmonary embolism, troponins pending. Patient is already on fondaparinux and [place] filters in place. EKG normal no ischemia or infarct noted. trops X 2 <6---pt symptoms unchanged. Happy to follow up with PCP. OUTSTANDING TASKS / RECOMMENDATIONS:    1. Follow up trops  2. D/c with follow-up instructions. FINAL IMPRESSION:     1. Chest pain on breathing    2.  Left leg pain        DISPOSITION:         DISPOSITION:  [x]  Discharge   []  Transfer -

## 2019-06-20 NOTE — ED NOTES
Patient has no signs or symptoms of acute distress at this time, remains on continuous telemetry and pulse ox monitoring. Will continue to monitor.      Jass Hurtado RN  06/19/19 1928

## 2019-06-20 NOTE — ED PROVIDER NOTES
Dr Po Chambers sign out, cp hx of pe,   On arixtra, vss  Ct pe pending,       Elissa Leung, DO  06/19/19 2315    Trop x2 -, ct no pe,   vss pt wishing to go home,   Melvin Marroquin-[place], DO  06/20/19 0121

## 2019-06-20 NOTE — ED PROVIDER NOTES
[facility] ED  EMERGENCY DEPARTMENT ENCOUNTER  RESIDENT    Pt Name: Karthik Caballero  MRN: [id number]  [place] 1980  Date of evaluation: 6/19/2019  PCP:  Natalio Lin MD    [address] [place] [address]       Chief Complaint   Patient presents with    Chest Pain     c/o chest pressure    Shortness of Breath     pt reports hx of PEs    Leg Pain     c/o left calf pain x3 days reports hx of DVTs    Nausea         HISTORY OF PRESENT ILLNESS    Karthik Caballero is a 35 y.o. female with a history of DVTs and PEs who presents with midsternal pressure on inspiration x3 days. Pain is worse when she reason, this pain is similar to previous pain she experienced when she had a pulmonary embolism in 2016. Pain is nonradiating and is more of a pressure-like sensation. Recently traveled yesterday for 6 hours in an automobile. Patient has a significant past medical history of type 2 diabetes, abdominal surgeries including a Awa-en-Y, essential hypertension, and antiphospholipid syndrome with previous PEs and DVTs. Patient is currently on fondaparinux and is compliant with her medication. Patient complains of left calf pain that started 3 days ago that slightly radiates up to the knee. Denies being short of breath, nausea, vomiting, or fevers. REVIEW OF SYSTEMS       Review of Systems   Constitutional: Negative for activity change, appetite change, chills, diaphoresis, fatigue and fever. HENT: Negative for sore throat. Eyes: Negative for discharge and redness. Respiratory: Positive for chest tightness. Negative for apnea, cough, shortness of breath, wheezing and stridor. Cardiovascular: Positive for chest pain. Negative for leg swelling. Gastrointestinal: Negative for abdominal distention, abdominal pain, constipation, diarrhea, nausea and vomiting. Genitourinary: Negative for difficulty urinating, dysuria, flank pain, frequency, hematuria and urgency.    Musculoskeletal: Negative for arthralgias. Neurological: Negative for dizziness, tremors, seizures, syncope, facial asymmetry, speech difficulty, weakness, light-headedness, numbness and headaches. Hematological: Negative for adenopathy. PAST MEDICAL HISTORY    has a past medical history of Alcohol abuse, Anxiety, Arthritis, Painting esophagus, Bipolar disorder, unspecified ([facility] [address] Genital herpes, unspecified, GERD (gastroesophageal reflux disease), Hx of blood clots, Hypertension, Iron deficiency anemia, Lumbosacral spondylosis without myelopathy, MDRO (multiple drug resistant organisms) resistance, Miscarriage, Morbid obesity ([facility] [address] MRSA (methicillin resistant staph aureus) culture positive, Pernicious anemia, Primary hypercoagulable state ([facility] [address] Pulmonary embolism ([facility] [address] Suicide attempt ([facility] [address] SVT (supraventricular tachycardia) ([facility] [address] and Type 2 diabetes mellitus, with long-term current use of insulin ([facility] [address] SURGICAL HISTORY      has a past surgical history that includes Cholecystectomy; Liver Resection; hernia repair; Tonsillectomy; Endoscopy, colon, diagnostic; Abdominal hernia repair (2014); Abdominal hernia repair (11/3/14); Bariatric Surgery (2013); Dilation and curettage of uterus (2005); Finger amputation (Left, 02/09/2015); lymph node biopsy (1990); and yariel and bso (cervix removed) (2011).       CURRENT MEDICATIONS       Discharge Medication List as of 6/20/2019  1:19 AM      CONTINUE these medications which have NOT CHANGED    Details   ondansetron (ZOFRAN) 4 MG tablet Take 1 tablet by mouth every 8 hours as needed for Nausea or Vomiting, Disp-15 tablet, R-0Print      pantoprazole (PROTONIX) 40 MG tablet Take 1 tablet by mouth daily, Disp-30 tablet, R-3Normal      QUEtiapine (SEROQUEL) 100 MG tablet Take 400 mg by mouth nightly as neededHistorical Med      Multiple Vitamins-Minerals (THERAPEUTIC MULTIVITAMIN-MINERALS) tablet Take 1 tablet by mouth dailyHistorical Med      buPROPion (WELLBUTRIN XL) 300 MG extended release tablet Take 300 mg by mouth every morningHistorical Med      acetaminophen (TYLENOL) 325 MG tablet Take 2 tablets by mouth every 8 hours as needed for Pain, Disp-30 tablet, R-0Print      Insulin Glargine (TOUJEO SOLOSTAR SC) Inject 55 Units into the skin nightlyHistorical Med      RaNITidine HCl (ZANTAC PO) Take 15 mg by mouth 2 times daily Historical Med      Cyanocobalamin (B-12) 1000 MCG/ML KIT Inject as directedHistorical Med      tiZANidine (ZANAFLEX) 4 MG tablet Take 1 tablet by mouth every 6 hours as needed (pain/spasm), Disp-40 tablet, R-0Print      LORazepam (ATIVAN) 1 MG tablet Take 2 mg by mouth 3 times daily as needed for Anxiety. Dima Gu Historical Med      diltiazem (CARDIZEM CD) 240 MG extended release capsule Take 1 capsule by mouth daily, Disp-30 capsule, R-3Normal      lisinopril (PRINIVIL;ZESTRIL) 5 MG tablet Take 1 tablet by mouth Daily with lunch, Disp-30 tablet, R-3Normal      magnesium oxide (MAG-OX) 400 (241.3 MG) MG TABS tablet Take 1 tablet by mouth 2 times daily, Disp-30 tablet, R-1Normal      Menthol, Topical Analgesic, 5 % PADS Apply to the chest wall, as needed for pain, daily. , Disp-10 each, R-1      vitamin D (CHOLECALCIFEROL) 1000 UNIT TABS tablet Take 10,000 Units by mouth daily None for about 1 week, states not given while in hospitalHistorical Med      fondaparinux (ARIXTRA) 10 MG/0.8ML SOLN injection INJECT 0.8MLS INTO THE SKIN DAILY, Disp-24 mL, R-5      Lancets MISC 3 TIMES DAILY Starting 3/4/2016, Until Discontinued, Disp-100 each, R-3, Normal      bumetanide (BUMEX) 2 MG tablet Take 1 tablet by mouth daily, Disp-30 tablet, R-11      Insulin Pen Needle 31G X 6 MM MISC 2 TIMES DAILY Starting 12/8/2015, Until Discontinued, Disp-100 each, R-3, Normal      glucose monitoring kit (FREESTYLE) monitoring kit DAILY PRN Starting 12/1/2015, Until Discontinued, Disp-1 kit, R-0, Normal             ALLERGIES     is allergic to betadine [povidone iodine]; celexa [citalopram hydrobromide]; lasix [furosemide]; macrobid [nitrofurantoin monohyd macro]; macrobid [nitrofurantoin]; other; betadine [povidone iodine]; codeine; lithium; paxil [paroxetine hcl]; and tegretol [carbamazepine]. FAMILY HISTORY   indicated that her mother is alive. She indicated that her father is alive. She indicated that the status of her maternal aunt is unknown. She indicated that the status of her maternal uncle is unknown. She indicated that the status of her maternal cousin is unknown.     family history includes Breast Cancer in her maternal aunt and maternal cousin; Cancer in her maternal uncle; Depression in her mother; Diabetes in her father; Heart Disease in her father; High Blood Pressure in her father and mother; High Cholesterol in her father and mother; Kidney Disease in her father. SOCIAL HISTORY      reports that she has never smoked. She has never used smokeless tobacco. She reports that she does not drink alcohol or use drugs. PHYSICAL EXAM     INITIAL VITALS:  height is 5' 5\" (1.651 m) and weight is 310 lb (140.6 kg) (abnormal). Her oral temperature is 98.4 °F (36.9 °C). Her blood pressure is 120/75 and her pulse is 80. Her respiration is 19 and oxygen saturation is 93%. Physical Exam   Constitutional: She is oriented to person, place, and time. She appears well-developed and well-nourished. No distress. HENT:   Head: Normocephalic and atraumatic. Right Ear: External ear normal.   Left Ear: External ear normal.   Eyes: Pupils are equal, round, and reactive to light. Conjunctivae and EOM are normal. Right eye exhibits no discharge. Left eye exhibits no discharge. No scleral icterus. Neck: Normal range of motion. Cardiovascular: Normal rate, regular rhythm and normal heart sounds. Exam reveals no gallop and no friction rub. No murmur heard. Pulmonary/Chest: Effort normal and breath sounds normal. No stridor. No respiratory distress. She has no decreased breath sounds. She has no wheezes. components within normal limits   IMMATURE PLATELET FRACTION   TROPONIN             EMERGENCY DEPARTMENT COURSE:   Vitals:    Vitals:    06/19/19 2201 06/19/19 2340   BP: 120/75    Pulse: 92 80   Resp: 19    Temp: 98.4 °F (36.9 °C)    TempSrc: Oral    SpO2: 100% 93%   Weight: (!) 310 lb (140.6 kg)    Height: 5' 5\" (1.651 m)        CT PE performed is negative for acute pulmonary embolism  Troponins pending  EKG shows no signs of acute infarct or ischemia  Patient received 2 mg morphine IV  Patient expressed desire to leave and not be admitted today        CONSULTS:  None      PROCEDURES:  Procedures        FINAL IMPRESSION      1. Chest pain on breathing    2.  Left leg pain            DISPOSITION/PLAN   DISPOSITION Decision To Discharge 06/19/2019 11:53:10 PM      PATIENT REFERRED TO:  Jesse Espinoza MD  [address] [place] [phone]    Schedule an appointment as soon as possible for a visit in 1 week  Follow up within 1 week, Return to ED sooner if symptoms worsen,       DISCHARGE MEDICATIONS:  Discharge Medication List as of 6/20/2019  1:19 AM          (Please note that portions of this note were completed with a voice recognition program.  Efforts weremade to edit the dictations but occasionally words are mis-transcribed.)    Sandra Montoya MD, [address] [address]  PGY-1 Resident             Sandra Montoya  Resident  06/22/19 1226

## 2019-06-20 NOTE — ED PROVIDER NOTES
[facility]     Emergency Department     Faculty Attestation    I performed a history and physical examination of the patient and discussed management with the resident. I reviewed the resident´s note and agree with the documented findings and plan of care. Any areas of disagreement are noted on the chart. I was personally present for the key portions of any procedures. I have documented in the chart those procedures where I was not present during the key portions. I have reviewed the emergency nurses triage note. I agree with the chief complaint, past medical history, past surgical history, allergies, medications, social and family history as documented unless otherwise noted below. For Physician Assistant/ Nurse Practitioner cases/documentation I have personally evaluated this patient and have completed at least one if not all key elements of the E/M (history, physical exam, and MDM). Additional findings are as noted.     History of PE, on Arixtra, patient complains of chest heaviness and pleuritic pain which she states feels just like when she has had PEs in the past, patient also has left calf pain, equal breath sounds, heart tones normal.  Plan is for blood work and CTA to rule out PE     Elmer Coronel MD  06/19/19 3045

## 2019-07-14 ENCOUNTER — APPOINTMENT (OUTPATIENT)
Dept: CT IMAGING | Age: 39
End: 2019-07-14
Payer: MEDICARE

## 2019-07-14 ENCOUNTER — HOSPITAL ENCOUNTER (EMERGENCY)
Age: 39
Discharge: HOME | End: 2019-07-14
Attending: EMERGENCY MEDICINE
Payer: MEDICARE

## 2019-07-14 VITALS
RESPIRATION RATE: 18 BRPM | OXYGEN SATURATION: 98 % | SYSTOLIC BLOOD PRESSURE: 149 MMHG | BODY MASS INDEX: 43.95 KG/M2 | HEIGHT: 68 IN | WEIGHT: 290 LBS | HEART RATE: 121 BPM | DIASTOLIC BLOOD PRESSURE: 92 MMHG | TEMPERATURE: 98.1 F

## 2019-07-14 DIAGNOSIS — R10.84 GENERALIZED ABDOMINAL PAIN: Primary | ICD-10-CM

## 2019-07-14 LAB
ABSOLUTE EOS #: 0.1 K/UL (ref 0–0.4)
ABSOLUTE IMMATURE GRANULOCYTE: ABNORMAL K/UL (ref 0–0.3)
ABSOLUTE LYMPH #: 1.6 K/UL (ref 1–4.8)
ABSOLUTE MONO #: 0.5 K/UL (ref 0.1–1.3)
ALBUMIN SERPL-MCNC: 4.1 G/DL (ref 3.5–5.2)
ALBUMIN/GLOBULIN RATIO: ABNORMAL (ref 1–2.5)
ALP BLD-CCNC: 150 U/L (ref 35–104)
ALT SERPL-CCNC: 19 U/L (ref 5–33)
ANION GAP SERPL CALCULATED.3IONS-SCNC: 16 MMOL/L (ref 9–17)
AST SERPL-CCNC: 14 U/L
BASOPHILS # BLD: 1 % (ref 0–2)
BASOPHILS ABSOLUTE: 0.1 K/UL (ref 0–0.2)
BILIRUB SERPL-MCNC: 0.32 MG/DL (ref 0.3–1.2)
BUN BLDV-MCNC: 11 MG/DL (ref 6–20)
BUN/CREAT BLD: ABNORMAL (ref 9–20)
CALCIUM SERPL-MCNC: 9.5 MG/DL (ref 8.6–10.4)
CHLORIDE BLD-SCNC: 102 MMOL/L (ref 98–107)
CO2: 19 MMOL/L (ref 20–31)
CREAT SERPL-MCNC: 0.57 MG/DL (ref 0.5–0.9)
DIFFERENTIAL TYPE: ABNORMAL
EOSINOPHILS RELATIVE PERCENT: 2 % (ref 0–4)
GFR AFRICAN AMERICAN: >60 ML/MIN
GFR NON-AFRICAN AMERICAN: >60 ML/MIN
GFR SERPL CREATININE-BSD FRML MDRD: ABNORMAL ML/MIN/{1.73_M2}
GFR SERPL CREATININE-BSD FRML MDRD: ABNORMAL ML/MIN/{1.73_M2}
GLUCOSE BLD-MCNC: 211 MG/DL (ref 70–99)
HCT VFR BLD CALC: 46.2 % (ref 36–46)
HEMOGLOBIN: 15.1 G/DL (ref 12–16)
IMMATURE GRANULOCYTES: ABNORMAL %
LIPASE: 23 U/L (ref 13–60)
LYMPHOCYTES # BLD: 21 % (ref 24–44)
MCH RBC QN AUTO: 30.1 PG (ref 26–34)
MCHC RBC AUTO-ENTMCNC: 32.8 G/DL (ref 31–37)
MCV RBC AUTO: 91.6 FL (ref 80–100)
MONOCYTES # BLD: 7 % (ref 1–7)
NRBC AUTOMATED: ABNORMAL PER 100 WBC
PDW BLD-RTO: 15 % (ref 11.5–14.9)
PLATELET # BLD: 320 K/UL (ref 150–450)
PLATELET ESTIMATE: ABNORMAL
PMV BLD AUTO: 9.6 FL (ref 6–12)
POTASSIUM SERPL-SCNC: 4.1 MMOL/L (ref 3.7–5.3)
RBC # BLD: 5.04 M/UL (ref 4–5.2)
RBC # BLD: ABNORMAL 10*6/UL
SEG NEUTROPHILS: 69 % (ref 36–66)
SEGMENTED NEUTROPHILS ABSOLUTE COUNT: 5.3 K/UL (ref 1.3–9.1)
SODIUM BLD-SCNC: 137 MMOL/L (ref 135–144)
TOTAL PROTEIN: 7.6 G/DL (ref 6.4–8.3)
WBC # BLD: 7.6 K/UL (ref 3.5–11)
WBC # BLD: ABNORMAL 10*3/UL

## 2019-07-14 PROCEDURE — 85025 COMPLETE CBC W/AUTO DIFF WBC: CPT

## 2019-07-14 PROCEDURE — 6360000004 HC RX CONTRAST MEDICATION: Performed by: EMERGENCY MEDICINE

## 2019-07-14 PROCEDURE — 96376 TX/PRO/DX INJ SAME DRUG ADON: CPT

## 2019-07-14 PROCEDURE — 80053 COMPREHEN METABOLIC PANEL: CPT

## 2019-07-14 PROCEDURE — 83690 ASSAY OF LIPASE: CPT

## 2019-07-14 PROCEDURE — 36415 COLL VENOUS BLD VENIPUNCTURE: CPT

## 2019-07-14 PROCEDURE — 96374 THER/PROPH/DIAG INJ IV PUSH: CPT

## 2019-07-14 PROCEDURE — 6360000002 HC RX W HCPCS: Performed by: EMERGENCY MEDICINE

## 2019-07-14 PROCEDURE — 2580000003 HC RX 258: Performed by: EMERGENCY MEDICINE

## 2019-07-14 PROCEDURE — 96375 TX/PRO/DX INJ NEW DRUG ADDON: CPT

## 2019-07-14 PROCEDURE — 99284 EMERGENCY DEPT VISIT MOD MDM: CPT

## 2019-07-14 PROCEDURE — 74177 CT ABD & PELVIS W/CONTRAST: CPT

## 2019-07-14 RX ORDER — 0.9 % SODIUM CHLORIDE 0.9 %
1000 INTRAVENOUS SOLUTION INTRAVENOUS ONCE
Status: COMPLETED | OUTPATIENT
Start: 2019-07-14 | End: 2019-07-14

## 2019-07-14 RX ORDER — FENTANYL CITRATE 50 UG/ML
50 INJECTION, SOLUTION INTRAMUSCULAR; INTRAVENOUS ONCE
Status: COMPLETED | OUTPATIENT
Start: 2019-07-14 | End: 2019-07-14

## 2019-07-14 RX ORDER — SODIUM CHLORIDE 0.9 % (FLUSH) 0.9 %
10 SYRINGE (ML) INJECTION PRN
Status: DISCONTINUED | OUTPATIENT
Start: 2019-07-14 | End: 2019-07-14 | Stop reason: HOSPADM

## 2019-07-14 RX ORDER — ONDANSETRON 2 MG/ML
4 INJECTION INTRAMUSCULAR; INTRAVENOUS ONCE
Status: COMPLETED | OUTPATIENT
Start: 2019-07-14 | End: 2019-07-14

## 2019-07-14 RX ORDER — 0.9 % SODIUM CHLORIDE 0.9 %
80 INTRAVENOUS SOLUTION INTRAVENOUS ONCE
Status: COMPLETED | OUTPATIENT
Start: 2019-07-14 | End: 2019-07-14

## 2019-07-14 RX ADMIN — SODIUM CHLORIDE 80 ML: 9 INJECTION, SOLUTION INTRAVENOUS at 14:59

## 2019-07-14 RX ADMIN — FENTANYL CITRATE 50 MCG: 50 INJECTION, SOLUTION INTRAMUSCULAR; INTRAVENOUS at 14:30

## 2019-07-14 RX ADMIN — SODIUM CHLORIDE 1000 ML: 9 INJECTION, SOLUTION INTRAVENOUS at 13:02

## 2019-07-14 RX ADMIN — IOVERSOL 75 ML: 741 INJECTION INTRA-ARTERIAL; INTRAVENOUS at 14:59

## 2019-07-14 RX ADMIN — Medication 10 ML: at 14:59

## 2019-07-14 RX ADMIN — FENTANYL CITRATE 50 MCG: 50 INJECTION, SOLUTION INTRAMUSCULAR; INTRAVENOUS at 13:04

## 2019-07-14 RX ADMIN — ONDANSETRON 4 MG: 2 INJECTION INTRAMUSCULAR; INTRAVENOUS at 13:02

## 2019-07-14 ASSESSMENT — PAIN SCALES - GENERAL
PAINLEVEL_OUTOF10: 8
PAINLEVEL_OUTOF10: 8
PAINLEVEL_OUTOF10: 5
PAINLEVEL_OUTOF10: 8

## 2019-07-14 ASSESSMENT — ENCOUNTER SYMPTOMS
EYES NEGATIVE: 1
NAUSEA: 1
SHORTNESS OF BREATH: 0
COUGH: 0
ABDOMINAL PAIN: 1
RESPIRATORY NEGATIVE: 1
VOMITING: 1
BACK PAIN: 0

## 2019-07-14 NOTE — ED PROVIDER NOTES
EMERGENCY DEPARTMENT ENCOUNTER    Pt Name: Julieth Saxena  MRN: [id number]  [place] 1980  Date of evaluation: 7/14/19  CHIEF COMPLAINT       Chief Complaint   Patient presents with    Abdominal Pain    Emesis     HISTORY OF PRESENT ILLNESS   The pt presents for evaluation of abd pain. Vomiting as well. Ongoing for several weeks. Pt has been on zofran and phenergan with little improvement. Pt states that she has a little liquid stool. Partial sbo in February. History of multiple surgeries including gastric bypass. The history is provided by the patient. Abdominal Pain   Pain location:  Generalized  Pain quality: sharp    Pain radiates to:  Does not radiate  Pain severity:  Moderate  Onset quality:  Gradual  Duration: few weeks. Timing:  Constant  Progression:  Worsening  Chronicity:  New  Relieved by:  Nothing  Worsened by:  Nothing  Associated symptoms: nausea and vomiting    Associated symptoms: no chest pain, no chills, no cough, no fever and no shortness of breath        REVIEW OF SYSTEMS     Review of Systems   Constitutional: Negative. Negative for chills and fever. HENT: Negative. Negative for congestion. Eyes: Negative. Respiratory: Negative. Negative for cough and shortness of breath. Cardiovascular: Negative. Negative for chest pain. Gastrointestinal: Positive for abdominal pain, nausea and vomiting. Genitourinary: Negative. Musculoskeletal: Negative. Negative for back pain. Skin: Negative. Negative for rash. Neurological: Negative. Negative for headaches. All other systems reviewed and are negative.     PASTMEDICAL HISTORY     Past Medical History:   Diagnosis Date    Alcohol abuse     sober since5    Anxiety     Arthritis     Painting esophagus     Bipolar disorder, unspecified (HCC)     Genital herpes, unspecified     GERD (gastroesophageal reflux disease)     Hx of blood clots dx 2 years ago    clots in both legs and lungs     Hypertension sbo, ramana, appy, torsion, toa, abscess, perforation, or other emergent pathology. Pt is appropriate for discharge. Discussed results and plan with the pt. They expressed appropriate understanding. Pt given close follow up, supportive care instructions and strict return instructions at the bedside. CRITICAL CARE:       PROCEDURES:    Procedures    DIAGNOSTIC RESULTS   EKG:All EKG's are interpreted by the Emergency Department Physician who either signs or Co-signs this chart in the absence of a cardiologist.        RADIOLOGY:All plain film, CT, MRI, and formal ultrasound images (except ED bedside ultrasound) are read by the radiologist, see reports below, unless otherwisenoted in MDM or here. CT ABDOMEN PELVIS W IV CONTRAST Additional Contrast? None   Final Result   No convincing evidence for acute intra-or intrapelvic pathology. No bowel   obstruction or inflammation. Postsurgical changes from previous hepatic resection. Status post   cholecystectomy and gastric bypass. LABS: All lab results were reviewed by myself, and all abnormals are listed below.   Labs Reviewed   COMPREHENSIVE METABOLIC PANEL - Abnormal; Notable for the following components:       Result Value    Glucose 211 (*)     CO2 19 (*)     Alkaline Phosphatase 150 (*)     All other components within normal limits   CBC WITH AUTO DIFFERENTIAL - Abnormal; Notable for the following components:    Hematocrit 46.2 (*)     RDW 15.0 (*)     Seg Neutrophils 69 (*)     Lymphocytes 21 (*)     All other components within normal limits   LIPASE       EMERGENCY DEPARTMENTCOURSE:         Vitals:    Vitals:    07/14/19 1238   BP: (!) 149/92   Pulse: 121   Resp: 18   Temp: 98.1 °F (36.7 °C)   TempSrc: Oral   SpO2: 98%   Weight: 290 lb (131.5 kg)   Height: 5' 8\" (1.727 m)       The patient was given the following medications while in the emergency department:  Orders Placed This Encounter   Medications    0.9 % sodium chloride bolus   

## 2019-09-01 ENCOUNTER — APPOINTMENT (OUTPATIENT)
Dept: CT IMAGING | Age: 39
End: 2019-09-01
Payer: MEDICARE

## 2019-09-01 ENCOUNTER — HOSPITAL ENCOUNTER (EMERGENCY)
Age: 39
Discharge: HOME | End: 2019-09-02
Attending: EMERGENCY MEDICINE
Payer: MEDICARE

## 2019-09-01 VITALS
RESPIRATION RATE: 14 BRPM | OXYGEN SATURATION: 100 % | BODY MASS INDEX: 44.41 KG/M2 | TEMPERATURE: 97.2 F | SYSTOLIC BLOOD PRESSURE: 152 MMHG | WEIGHT: 293 LBS | DIASTOLIC BLOOD PRESSURE: 98 MMHG | HEIGHT: 68 IN | HEART RATE: 96 BPM

## 2019-09-01 DIAGNOSIS — R11.2 NON-INTRACTABLE VOMITING WITH NAUSEA, UNSPECIFIED VOMITING TYPE: Primary | ICD-10-CM

## 2019-09-01 DIAGNOSIS — R10.9 ABDOMINAL PAIN, UNSPECIFIED ABDOMINAL LOCATION: ICD-10-CM

## 2019-09-01 LAB
ABSOLUTE EOS #: 0.27 K/UL (ref 0–0.44)
ABSOLUTE IMMATURE GRANULOCYTE: <0.03 K/UL (ref 0–0.3)
ABSOLUTE LYMPH #: 2.5 K/UL (ref 1.1–3.7)
ABSOLUTE MONO #: 0.59 K/UL (ref 0.1–1.2)
ALBUMIN SERPL-MCNC: 3.4 G/DL (ref 3.5–5.2)
ALBUMIN/GLOBULIN RATIO: 1 (ref 1–2.5)
ALP BLD-CCNC: 130 U/L (ref 35–104)
ALT SERPL-CCNC: 13 U/L (ref 5–33)
ANION GAP SERPL CALCULATED.3IONS-SCNC: 12 MMOL/L (ref 9–17)
AST SERPL-CCNC: 9 U/L
BASOPHILS # BLD: 1 % (ref 0–2)
BASOPHILS ABSOLUTE: 0.05 K/UL (ref 0–0.2)
BILIRUB SERPL-MCNC: 0.17 MG/DL (ref 0.3–1.2)
BUN BLDV-MCNC: 15 MG/DL (ref 6–20)
BUN/CREAT BLD: ABNORMAL (ref 9–20)
CALCIUM SERPL-MCNC: 9.1 MG/DL (ref 8.6–10.4)
CHLORIDE BLD-SCNC: 105 MMOL/L (ref 98–107)
CO2: 21 MMOL/L (ref 20–31)
CREAT SERPL-MCNC: 0.6 MG/DL (ref 0.5–0.9)
DIFFERENTIAL TYPE: NORMAL
EOSINOPHILS RELATIVE PERCENT: 3 % (ref 1–4)
GFR AFRICAN AMERICAN: >60 ML/MIN
GFR NON-AFRICAN AMERICAN: >60 ML/MIN
GFR SERPL CREATININE-BSD FRML MDRD: ABNORMAL ML/MIN/{1.73_M2}
GFR SERPL CREATININE-BSD FRML MDRD: ABNORMAL ML/MIN/{1.73_M2}
GLUCOSE BLD-MCNC: 299 MG/DL (ref 70–99)
HCT VFR BLD CALC: 41.7 % (ref 36.3–47.1)
HEMOGLOBIN: 13.3 G/DL (ref 11.9–15.1)
IMMATURE GRANULOCYTES: 0 %
LIPASE: 23 U/L (ref 13–60)
LYMPHOCYTES # BLD: 32 % (ref 24–43)
MCH RBC QN AUTO: 30.5 PG (ref 25.2–33.5)
MCHC RBC AUTO-ENTMCNC: 31.9 G/DL (ref 28.4–34.8)
MCV RBC AUTO: 95.6 FL (ref 82.6–102.9)
MONOCYTES # BLD: 7 % (ref 3–12)
NRBC AUTOMATED: 0 PER 100 WBC
PDW BLD-RTO: 13.1 % (ref 11.8–14.4)
PLATELET # BLD: 269 K/UL (ref 138–453)
PLATELET ESTIMATE: NORMAL
PMV BLD AUTO: 12 FL (ref 8.1–13.5)
POTASSIUM SERPL-SCNC: 4.1 MMOL/L (ref 3.7–5.3)
RBC # BLD: 4.36 M/UL (ref 3.95–5.11)
RBC # BLD: NORMAL 10*6/UL
SEG NEUTROPHILS: 57 % (ref 36–65)
SEGMENTED NEUTROPHILS ABSOLUTE COUNT: 4.51 K/UL (ref 1.5–8.1)
SODIUM BLD-SCNC: 138 MMOL/L (ref 135–144)
TOTAL PROTEIN: 6.7 G/DL (ref 6.4–8.3)
WBC # BLD: 7.9 K/UL (ref 3.5–11.3)
WBC # BLD: NORMAL 10*3/UL

## 2019-09-01 PROCEDURE — 6360000004 HC RX CONTRAST MEDICATION: Performed by: EMERGENCY MEDICINE

## 2019-09-01 PROCEDURE — 80053 COMPREHEN METABOLIC PANEL: CPT

## 2019-09-01 PROCEDURE — 6360000002 HC RX W HCPCS: Performed by: NURSE PRACTITIONER

## 2019-09-01 PROCEDURE — 6360000002 HC RX W HCPCS: Performed by: EMERGENCY MEDICINE

## 2019-09-01 PROCEDURE — 96375 TX/PRO/DX INJ NEW DRUG ADDON: CPT

## 2019-09-01 PROCEDURE — 74176 CT ABD & PELVIS W/O CONTRAST: CPT

## 2019-09-01 PROCEDURE — 2580000003 HC RX 258: Performed by: NURSE PRACTITIONER

## 2019-09-01 PROCEDURE — 96376 TX/PRO/DX INJ SAME DRUG ADON: CPT

## 2019-09-01 PROCEDURE — 83690 ASSAY OF LIPASE: CPT

## 2019-09-01 PROCEDURE — 85025 COMPLETE CBC W/AUTO DIFF WBC: CPT

## 2019-09-01 PROCEDURE — 99284 EMERGENCY DEPT VISIT MOD MDM: CPT

## 2019-09-01 PROCEDURE — 96374 THER/PROPH/DIAG INJ IV PUSH: CPT

## 2019-09-01 RX ORDER — MORPHINE SULFATE 4 MG/ML
4 INJECTION, SOLUTION INTRAMUSCULAR; INTRAVENOUS ONCE
Status: COMPLETED | OUTPATIENT
Start: 2019-09-01 | End: 2019-09-01

## 2019-09-01 RX ORDER — PROMETHAZINE HYDROCHLORIDE 25 MG/ML
12.5 INJECTION, SOLUTION INTRAMUSCULAR; INTRAVENOUS ONCE
Status: COMPLETED | OUTPATIENT
Start: 2019-09-01 | End: 2019-09-01

## 2019-09-01 RX ORDER — FENTANYL CITRATE 50 UG/ML
50 INJECTION, SOLUTION INTRAMUSCULAR; INTRAVENOUS ONCE
Status: COMPLETED | OUTPATIENT
Start: 2019-09-01 | End: 2019-09-01

## 2019-09-01 RX ORDER — PROMETHAZINE HYDROCHLORIDE 25 MG/1
25 TABLET ORAL EVERY 6 HOURS PRN
Qty: 6 TABLET | Refills: 0 | Status: SHIPPED | OUTPATIENT
Start: 2019-09-01 | End: 2019-09-01 | Stop reason: SDUPTHER

## 2019-09-01 RX ORDER — PROMETHAZINE HYDROCHLORIDE 25 MG/1
25 TABLET ORAL EVERY 6 HOURS PRN
Qty: 6 TABLET | Refills: 0 | Status: SHIPPED | OUTPATIENT
Start: 2019-09-01 | End: 2019-09-08

## 2019-09-01 RX ORDER — 0.9 % SODIUM CHLORIDE 0.9 %
1000 INTRAVENOUS SOLUTION INTRAVENOUS ONCE
Status: COMPLETED | OUTPATIENT
Start: 2019-09-01 | End: 2019-09-01

## 2019-09-01 RX ADMIN — FENTANYL CITRATE 50 MCG: 50 INJECTION INTRAMUSCULAR; INTRAVENOUS at 23:03

## 2019-09-01 RX ADMIN — SODIUM CHLORIDE 1000 ML: 9 INJECTION, SOLUTION INTRAVENOUS at 21:37

## 2019-09-01 RX ADMIN — PROMETHAZINE HYDROCHLORIDE 12.5 MG: 25 INJECTION INTRAMUSCULAR; INTRAVENOUS at 23:56

## 2019-09-01 RX ADMIN — MORPHINE SULFATE 4 MG: 4 INJECTION INTRAVENOUS at 23:29

## 2019-09-01 RX ADMIN — FENTANYL CITRATE 50 MCG: 50 INJECTION, SOLUTION INTRAMUSCULAR; INTRAVENOUS at 21:54

## 2019-09-01 RX ADMIN — PROMETHAZINE HYDROCHLORIDE 12.5 MG: 25 INJECTION INTRAMUSCULAR; INTRAVENOUS at 21:33

## 2019-09-01 RX ADMIN — IOHEXOL 50 ML: 240 INJECTION, SOLUTION INTRATHECAL; INTRAVASCULAR; INTRAVENOUS; ORAL at 23:13

## 2019-09-01 ASSESSMENT — PAIN SCALES - GENERAL
PAINLEVEL_OUTOF10: 8
PAINLEVEL_OUTOF10: 8
PAINLEVEL_OUTOF10: 7
PAINLEVEL_OUTOF10: 7

## 2019-09-01 ASSESSMENT — PAIN DESCRIPTION - PAIN TYPE: TYPE: ACUTE PAIN

## 2019-09-01 ASSESSMENT — ENCOUNTER SYMPTOMS
ABDOMINAL PAIN: 1
BLOOD IN STOOL: 0
NAUSEA: 1
CONSTIPATION: 0
EYE PAIN: 0
SHORTNESS OF BREATH: 0
FACIAL SWELLING: 0
DIARRHEA: 0
ABDOMINAL DISTENTION: 1
VOICE CHANGE: 0
COUGH: 0
TROUBLE SWALLOWING: 0
CHEST TIGHTNESS: 0
ANAL BLEEDING: 0
VOMITING: 1
PHOTOPHOBIA: 0
BACK PAIN: 0
RECTAL PAIN: 0

## 2019-09-01 ASSESSMENT — PAIN DESCRIPTION - FREQUENCY: FREQUENCY: CONTINUOUS

## 2019-09-01 ASSESSMENT — PAIN DESCRIPTION - ONSET: ONSET: ON-GOING

## 2019-09-01 ASSESSMENT — PAIN DESCRIPTION - LOCATION: LOCATION: ABDOMEN

## 2019-09-01 ASSESSMENT — PAIN DESCRIPTION - PROGRESSION: CLINICAL_PROGRESSION: NOT CHANGED

## 2019-09-02 NOTE — ED NOTES
Pt. Requesting more pain medication stating the Fentanyl only brought her down to a 5 or a 6  NP notified       Acie Payment, PEBBLES  09/01/19 2654

## 2019-09-03 ENCOUNTER — HOSPITAL ENCOUNTER (OUTPATIENT)
Age: 39
Setting detail: OBSERVATION
Discharge: HOME | End: 2019-09-05
Attending: EMERGENCY MEDICINE | Admitting: EMERGENCY MEDICINE
Payer: MEDICARE

## 2019-09-03 ENCOUNTER — APPOINTMENT (OUTPATIENT)
Dept: GENERAL RADIOLOGY | Age: 39
End: 2019-09-03
Payer: MEDICARE

## 2019-09-03 DIAGNOSIS — R11.2 NAUSEA AND VOMITING, INTRACTABILITY OF VOMITING NOT SPECIFIED, UNSPECIFIED VOMITING TYPE: ICD-10-CM

## 2019-09-03 DIAGNOSIS — K59.00 CONSTIPATION, UNSPECIFIED CONSTIPATION TYPE: Primary | ICD-10-CM

## 2019-09-03 LAB
-: ABNORMAL
ABSOLUTE EOS #: 0.24 K/UL (ref 0–0.44)
ABSOLUTE IMMATURE GRANULOCYTE: 0.03 K/UL (ref 0–0.3)
ABSOLUTE LYMPH #: 1.96 K/UL (ref 1.1–3.7)
ABSOLUTE MONO #: 0.53 K/UL (ref 0.1–1.2)
ALBUMIN SERPL-MCNC: 3.8 G/DL (ref 3.5–5.2)
ALBUMIN/GLOBULIN RATIO: 1 (ref 1–2.5)
ALP BLD-CCNC: 160 U/L (ref 35–104)
ALT SERPL-CCNC: 16 U/L (ref 5–33)
AMORPHOUS: ABNORMAL
ANION GAP SERPL CALCULATED.3IONS-SCNC: 13 MMOL/L (ref 9–17)
AST SERPL-CCNC: 11 U/L
BACTERIA: ABNORMAL
BASOPHILS # BLD: 1 % (ref 0–2)
BASOPHILS ABSOLUTE: 0.05 K/UL (ref 0–0.2)
BILIRUB SERPL-MCNC: 0.16 MG/DL (ref 0.3–1.2)
BILIRUBIN URINE: NEGATIVE
BUN BLDV-MCNC: 16 MG/DL (ref 6–20)
BUN/CREAT BLD: ABNORMAL (ref 9–20)
CALCIUM SERPL-MCNC: 9.3 MG/DL (ref 8.6–10.4)
CASTS UA: ABNORMAL /LPF (ref 0–8)
CHLORIDE BLD-SCNC: 100 MMOL/L (ref 98–107)
CO2: 25 MMOL/L (ref 20–31)
COLOR: YELLOW
CREAT SERPL-MCNC: 0.63 MG/DL (ref 0.5–0.9)
CRYSTALS, UA: ABNORMAL /HPF
DIFFERENTIAL TYPE: NORMAL
EOSINOPHILS RELATIVE PERCENT: 3 % (ref 1–4)
EPITHELIAL CELLS UA: ABNORMAL /HPF (ref 0–5)
GFR AFRICAN AMERICAN: >60 ML/MIN
GFR NON-AFRICAN AMERICAN: >60 ML/MIN
GFR SERPL CREATININE-BSD FRML MDRD: ABNORMAL ML/MIN/{1.73_M2}
GFR SERPL CREATININE-BSD FRML MDRD: ABNORMAL ML/MIN/{1.73_M2}
GLUCOSE BLD-MCNC: 218 MG/DL (ref 70–99)
GLUCOSE URINE: ABNORMAL
HCT VFR BLD CALC: 46.7 % (ref 36.3–47.1)
HEMOGLOBIN: 14.5 G/DL (ref 11.9–15.1)
IMMATURE GRANULOCYTES: 0 %
KETONES, URINE: NEGATIVE
LACTIC ACID, WHOLE BLOOD: 1.3 MMOL/L (ref 0.7–2.1)
LACTIC ACID: NORMAL MMOL/L
LEUKOCYTE ESTERASE, URINE: NEGATIVE
LYMPHOCYTES # BLD: 27 % (ref 24–43)
MCH RBC QN AUTO: 29.5 PG (ref 25.2–33.5)
MCHC RBC AUTO-ENTMCNC: 31 G/DL (ref 28.4–34.8)
MCV RBC AUTO: 94.9 FL (ref 82.6–102.9)
MONOCYTES # BLD: 7 % (ref 3–12)
MUCUS: ABNORMAL
NITRITE, URINE: NEGATIVE
NRBC AUTOMATED: 0 PER 100 WBC
OTHER OBSERVATIONS UA: ABNORMAL
PDW BLD-RTO: 13.1 % (ref 11.8–14.4)
PH UA: 5.5 (ref 5–8)
PLATELET # BLD: 283 K/UL (ref 138–453)
PLATELET ESTIMATE: NORMAL
PMV BLD AUTO: 12.2 FL (ref 8.1–13.5)
POTASSIUM SERPL-SCNC: 4.3 MMOL/L (ref 3.7–5.3)
PROTEIN UA: NEGATIVE
RBC # BLD: 4.92 M/UL (ref 3.95–5.11)
RBC # BLD: NORMAL 10*6/UL
RBC UA: ABNORMAL /HPF (ref 0–4)
RENAL EPITHELIAL, UA: ABNORMAL /HPF
SEG NEUTROPHILS: 62 % (ref 36–65)
SEGMENTED NEUTROPHILS ABSOLUTE COUNT: 4.36 K/UL (ref 1.5–8.1)
SODIUM BLD-SCNC: 138 MMOL/L (ref 135–144)
SPECIFIC GRAVITY UA: 1.04 (ref 1–1.03)
TOTAL PROTEIN: 7.6 G/DL (ref 6.4–8.3)
TRICHOMONAS: ABNORMAL
TURBIDITY: CLEAR
URINE HGB: NEGATIVE
UROBILINOGEN, URINE: NORMAL
WBC # BLD: 7.2 K/UL (ref 3.5–11.3)
WBC # BLD: NORMAL 10*3/UL
WBC UA: ABNORMAL /HPF (ref 0–5)
YEAST: ABNORMAL

## 2019-09-03 PROCEDURE — 96375 TX/PRO/DX INJ NEW DRUG ADDON: CPT

## 2019-09-03 PROCEDURE — 6370000000 HC RX 637 (ALT 250 FOR IP): Performed by: STUDENT IN AN ORGANIZED HEALTH CARE EDUCATION/TRAINING PROGRAM

## 2019-09-03 PROCEDURE — 80053 COMPREHEN METABOLIC PANEL: CPT

## 2019-09-03 PROCEDURE — 85025 COMPLETE CBC W/AUTO DIFF WBC: CPT

## 2019-09-03 PROCEDURE — 81001 URINALYSIS AUTO W/SCOPE: CPT

## 2019-09-03 PROCEDURE — 6360000002 HC RX W HCPCS: Performed by: STUDENT IN AN ORGANIZED HEALTH CARE EDUCATION/TRAINING PROGRAM

## 2019-09-03 PROCEDURE — 99285 EMERGENCY DEPT VISIT HI MDM: CPT

## 2019-09-03 PROCEDURE — 96376 TX/PRO/DX INJ SAME DRUG ADON: CPT

## 2019-09-03 PROCEDURE — 2580000003 HC RX 258: Performed by: STUDENT IN AN ORGANIZED HEALTH CARE EDUCATION/TRAINING PROGRAM

## 2019-09-03 PROCEDURE — 86403 PARTICLE AGGLUT ANTBDY SCRN: CPT

## 2019-09-03 PROCEDURE — 74022 RADEX COMPL AQT ABD SERIES: CPT

## 2019-09-03 PROCEDURE — 2500000003 HC RX 250 WO HCPCS: Performed by: STUDENT IN AN ORGANIZED HEALTH CARE EDUCATION/TRAINING PROGRAM

## 2019-09-03 PROCEDURE — 96374 THER/PROPH/DIAG INJ IV PUSH: CPT

## 2019-09-03 PROCEDURE — 83605 ASSAY OF LACTIC ACID: CPT

## 2019-09-03 PROCEDURE — 87086 URINE CULTURE/COLONY COUNT: CPT

## 2019-09-03 PROCEDURE — G0378 HOSPITAL OBSERVATION PER HR: HCPCS

## 2019-09-03 PROCEDURE — 96365 THER/PROPH/DIAG IV INF INIT: CPT

## 2019-09-03 RX ORDER — DEXTROSE, SODIUM CHLORIDE, AND POTASSIUM CHLORIDE 5; .45; .15 G/100ML; G/100ML; G/100ML
INJECTION INTRAVENOUS CONTINUOUS
Status: DISCONTINUED | OUTPATIENT
Start: 2019-09-03 | End: 2019-09-05 | Stop reason: HOSPADM

## 2019-09-03 RX ORDER — MORPHINE SULFATE 4 MG/ML
2 INJECTION, SOLUTION INTRAMUSCULAR; INTRAVENOUS ONCE
Status: COMPLETED | OUTPATIENT
Start: 2019-09-03 | End: 2019-09-03

## 2019-09-03 RX ORDER — SODIUM CHLORIDE, SODIUM LACTATE, POTASSIUM CHLORIDE, AND CALCIUM CHLORIDE .6; .31; .03; .02 G/100ML; G/100ML; G/100ML; G/100ML
1000 INJECTION, SOLUTION INTRAVENOUS ONCE
Status: COMPLETED | OUTPATIENT
Start: 2019-09-03 | End: 2019-09-03

## 2019-09-03 RX ORDER — ACETAMINOPHEN 500 MG
1000 TABLET ORAL ONCE
Status: COMPLETED | OUTPATIENT
Start: 2019-09-03 | End: 2019-09-03

## 2019-09-03 RX ORDER — ONDANSETRON 2 MG/ML
4 INJECTION INTRAMUSCULAR; INTRAVENOUS ONCE
Status: COMPLETED | OUTPATIENT
Start: 2019-09-03 | End: 2019-09-03

## 2019-09-03 RX ORDER — MORPHINE SULFATE 4 MG/ML
4 INJECTION, SOLUTION INTRAMUSCULAR; INTRAVENOUS ONCE
Status: COMPLETED | OUTPATIENT
Start: 2019-09-03 | End: 2019-09-03

## 2019-09-03 RX ORDER — KETOROLAC TROMETHAMINE 15 MG/ML
15 INJECTION, SOLUTION INTRAMUSCULAR; INTRAVENOUS ONCE
Status: COMPLETED | OUTPATIENT
Start: 2019-09-03 | End: 2019-09-03

## 2019-09-03 RX ORDER — BISACODYL 10 MG
10 SUPPOSITORY, RECTAL RECTAL ONCE
Status: COMPLETED | OUTPATIENT
Start: 2019-09-03 | End: 2019-09-04

## 2019-09-03 RX ADMIN — KETOROLAC TROMETHAMINE 15 MG: 15 INJECTION, SOLUTION INTRAMUSCULAR; INTRAVENOUS at 19:39

## 2019-09-03 RX ADMIN — ONDANSETRON 4 MG: 2 INJECTION INTRAMUSCULAR; INTRAVENOUS at 19:36

## 2019-09-03 RX ADMIN — ACETAMINOPHEN 1000 MG: 500 TABLET ORAL at 19:36

## 2019-09-03 RX ADMIN — MORPHINE SULFATE 4 MG: 4 INJECTION INTRAVENOUS at 21:37

## 2019-09-03 RX ADMIN — POTASSIUM CHLORIDE, DEXTROSE MONOHYDRATE AND SODIUM CHLORIDE: 150; 5; 450 INJECTION, SOLUTION INTRAVENOUS at 22:51

## 2019-09-03 RX ADMIN — ONDANSETRON 4 MG: 2 INJECTION INTRAMUSCULAR; INTRAVENOUS at 21:37

## 2019-09-03 RX ADMIN — MORPHINE SULFATE 2 MG: 4 INJECTION INTRAVENOUS at 19:40

## 2019-09-03 RX ADMIN — SODIUM CHLORIDE, POTASSIUM CHLORIDE, SODIUM LACTATE AND CALCIUM CHLORIDE 1000 ML: 600; 310; 30; 20 INJECTION, SOLUTION INTRAVENOUS at 19:35

## 2019-09-03 ASSESSMENT — ENCOUNTER SYMPTOMS
TROUBLE SWALLOWING: 0
CONSTIPATION: 1
COUGH: 0
VOMITING: 1
ABDOMINAL DISTENTION: 1
ANAL BLEEDING: 0
SORE THROAT: 0
SHORTNESS OF BREATH: 0
BLOOD IN STOOL: 0
BACK PAIN: 0
WHEEZING: 0
NAUSEA: 1
ABDOMINAL PAIN: 1

## 2019-09-03 ASSESSMENT — PAIN SCALES - GENERAL
PAINLEVEL_OUTOF10: 8

## 2019-09-03 ASSESSMENT — PAIN DESCRIPTION - ORIENTATION: ORIENTATION: RIGHT

## 2019-09-03 ASSESSMENT — PAIN DESCRIPTION - LOCATION
LOCATION: ABDOMEN
LOCATION: ABDOMEN

## 2019-09-03 NOTE — ED PROVIDER NOTES
or ex partner: Not on file     Emotionally abused: Not on file     Physically abused: Not on file     Forced sexual activity: Not on file   Other Topics Concern    Not on file   Social History Narrative    Lives with Moshe Lewis ex  and daughter            Family History   Problem Relation Age of Onset    Depression Mother     High Blood Pressure Mother     High Cholesterol Mother     Diabetes Father     Heart Disease Father     Kidney Disease Father     High Blood Pressure Father     High Cholesterol Father     Cancer Maternal Uncle         of duodenum had whipple    Breast Cancer Maternal Aunt     Breast Cancer Maternal Cousin        Allergies:    Betadine [povidone iodine]; Celexa [citalopram hydrobromide]; Lasix [furosemide]; Macrobid [nitrofurantoin monohyd macro]; Macrobid [nitrofurantoin]; Other; Betadine [povidone iodine]; Codeine; Lithium; Paxil [paroxetine hcl]; and Tegretol [carbamazepine]    Home Medications:  Prior to Admission medications    Medication Sig Start Date End Date Taking?  Authorizing Provider   promethazine (PHENERGAN) 25 MG tablet Take 1 tablet by mouth every 6 hours as needed for Nausea 9/1/19 9/8/19  Priyanka Benitez DO   ondansetron (ZOFRAN) 4 MG tablet Take 1 tablet by mouth every 8 hours as needed for Nausea or Vomiting 2/25/19   oJ Perrin MD   pantoprazole (PROTONIX) 40 MG tablet Take 1 tablet by mouth daily 2/24/19   Sara Chun MD   QUEtiapine (SEROQUEL) 100 MG tablet Take 400 mg by mouth nightly as needed    Historical Provider, MD   Multiple Vitamins-Minerals (THERAPEUTIC MULTIVITAMIN-MINERALS) tablet Take 1 tablet by mouth daily    Historical Provider, MD   buPROPion (WELLBUTRIN XL) 300 MG extended release tablet Take 300 mg by mouth every morning    Historical Provider, MD   acetaminophen (TYLENOL) 325 MG tablet Take 2 tablets by mouth every 8 hours as needed for Pain 2/20/19   Shirl Gilford, MD   Insulin Glargine (TOUJEO SOLOSTAR SC) Inject 55 Units into the skin nightly    Historical Provider, MD   RaNITidine HCl (ZANTAC PO) Take 15 mg by mouth 2 times daily     Historical Provider, MD   Cyanocobalamin (B-12) 1000 MCG/ML KIT Inject as directed    Historical Provider, MD   tiZANidine (ZANAFLEX) 4 MG tablet Take 1 tablet by mouth every 6 hours as needed (pain/spasm) 7/2/17   Eh Ruiz DO   LORazepam (ATIVAN) 1 MG tablet Take 2 mg by mouth 3 times daily as needed for Anxiety. Avalon Ring Historical Provider, MD   diltiazem (CARDIZEM CD) 240 MG extended release capsule Take 1 capsule by mouth daily 4/10/17   Miguelito Ho MD   lisinopril (PRINIVIL;ZESTRIL) 5 MG tablet Take 1 tablet by mouth Daily with lunch 4/11/17   Miguelito Ho MD   magnesium oxide (MAG-OX) 400 (241.3 MG) MG TABS tablet Take 1 tablet by mouth 2 times daily 4/10/17   Miguelito Ho MD   Menthol, Topical Analgesic, 5 % PADS Apply to the chest wall, as needed for pain, daily. 1/9/17   Patrick Vilallobos PA-C   vitamin D (CHOLECALCIFEROL) 1000 UNIT TABS tablet Take 10,000 Units by mouth daily None for about 1 week, states not given while in hospital    Historical Provider, MD   fondaparinux (ARIXTRA) 10 MG/0.8ML SOLN injection INJECT 0.8MLS INTO THE SKIN DAILY 10/5/16   Usha Zurita MD   Lancets MISC 1 each by Other route 3 times daily 3/4/16   Nano Steele MD   bumetanide (BUMEX) 2 MG tablet Take 1 tablet by mouth daily  Patient taking differently: Take 2 mg by mouth daily None for more than 1 week 3/4/16   Nano Steele MD   Insulin Pen Needle 31G X 6 MM MISC 1 each by Does not apply route 2 times daily 12/8/15   Usha Zurita MD   glucose monitoring kit (FREESTYLE) monitoring kit 1 kit by Does not apply route daily as needed 12/1/15   Usha Zurita MD       REVIEW OF SYSTEMS    (2-9 systems for level 4, 10 or more for level 5)    Review of Systems   Constitutional: Negative for chills, fatigue and fever.    HENT: Negative for congestion, sore throat and trouble

## 2019-09-04 ENCOUNTER — APPOINTMENT (OUTPATIENT)
Dept: CT IMAGING | Age: 39
End: 2019-09-04
Payer: MEDICARE

## 2019-09-04 LAB
ABSOLUTE EOS #: 0.37 K/UL (ref 0–0.44)
ABSOLUTE IMMATURE GRANULOCYTE: 0.03 K/UL (ref 0–0.3)
ABSOLUTE LYMPH #: 1.39 K/UL (ref 1.1–3.7)
ABSOLUTE MONO #: 0.39 K/UL (ref 0.1–1.2)
ANION GAP SERPL CALCULATED.3IONS-SCNC: 12 MMOL/L (ref 9–17)
BASOPHILS # BLD: 1 % (ref 0–2)
BASOPHILS ABSOLUTE: 0.05 K/UL (ref 0–0.2)
BUN BLDV-MCNC: 11 MG/DL (ref 6–20)
BUN/CREAT BLD: ABNORMAL (ref 9–20)
CALCIUM SERPL-MCNC: 8.2 MG/DL (ref 8.6–10.4)
CHLORIDE BLD-SCNC: 102 MMOL/L (ref 98–107)
CO2: 22 MMOL/L (ref 20–31)
CREAT SERPL-MCNC: 0.51 MG/DL (ref 0.5–0.9)
CULTURE: ABNORMAL
DIFFERENTIAL TYPE: ABNORMAL
EOSINOPHILS RELATIVE PERCENT: 5 % (ref 1–4)
GFR AFRICAN AMERICAN: >60 ML/MIN
GFR NON-AFRICAN AMERICAN: >60 ML/MIN
GFR SERPL CREATININE-BSD FRML MDRD: ABNORMAL ML/MIN/{1.73_M2}
GFR SERPL CREATININE-BSD FRML MDRD: ABNORMAL ML/MIN/{1.73_M2}
GLUCOSE BLD-MCNC: 146 MG/DL (ref 65–105)
GLUCOSE BLD-MCNC: 153 MG/DL (ref 65–105)
GLUCOSE BLD-MCNC: 209 MG/DL (ref 65–105)
GLUCOSE BLD-MCNC: 227 MG/DL (ref 65–105)
GLUCOSE BLD-MCNC: 240 MG/DL (ref 65–105)
GLUCOSE BLD-MCNC: 254 MG/DL (ref 70–99)
HCT VFR BLD CALC: 42.2 % (ref 36.3–47.1)
HEMOGLOBIN: 12.9 G/DL (ref 11.9–15.1)
IMMATURE GRANULOCYTES: 0 %
LACTIC ACID, WHOLE BLOOD: 1.4 MMOL/L (ref 0.7–2.1)
LYMPHOCYTES # BLD: 20 % (ref 24–43)
Lab: ABNORMAL
MCH RBC QN AUTO: 29.9 PG (ref 25.2–33.5)
MCHC RBC AUTO-ENTMCNC: 30.6 G/DL (ref 28.4–34.8)
MCV RBC AUTO: 97.7 FL (ref 82.6–102.9)
MONOCYTES # BLD: 6 % (ref 3–12)
NRBC AUTOMATED: 0 PER 100 WBC
PDW BLD-RTO: 13.1 % (ref 11.8–14.4)
PLATELET # BLD: 199 K/UL (ref 138–453)
PLATELET ESTIMATE: ABNORMAL
PMV BLD AUTO: 12.9 FL (ref 8.1–13.5)
POTASSIUM SERPL-SCNC: 4.5 MMOL/L (ref 3.7–5.3)
RBC # BLD: 4.32 M/UL (ref 3.95–5.11)
RBC # BLD: ABNORMAL 10*6/UL
SEG NEUTROPHILS: 68 % (ref 36–65)
SEGMENTED NEUTROPHILS ABSOLUTE COUNT: 4.89 K/UL (ref 1.5–8.1)
SODIUM BLD-SCNC: 136 MMOL/L (ref 135–144)
SPECIMEN DESCRIPTION: ABNORMAL
WBC # BLD: 7.1 K/UL (ref 3.5–11.3)
WBC # BLD: ABNORMAL 10*3/UL

## 2019-09-04 PROCEDURE — 6370000000 HC RX 637 (ALT 250 FOR IP): Performed by: EMERGENCY MEDICINE

## 2019-09-04 PROCEDURE — G0378 HOSPITAL OBSERVATION PER HR: HCPCS

## 2019-09-04 PROCEDURE — 6370000000 HC RX 637 (ALT 250 FOR IP): Performed by: STUDENT IN AN ORGANIZED HEALTH CARE EDUCATION/TRAINING PROGRAM

## 2019-09-04 PROCEDURE — 2580000003 HC RX 258: Performed by: EMERGENCY MEDICINE

## 2019-09-04 PROCEDURE — 96376 TX/PRO/DX INJ SAME DRUG ADON: CPT

## 2019-09-04 PROCEDURE — 85025 COMPLETE CBC W/AUTO DIFF WBC: CPT

## 2019-09-04 PROCEDURE — 82947 ASSAY GLUCOSE BLOOD QUANT: CPT

## 2019-09-04 PROCEDURE — 96372 THER/PROPH/DIAG INJ SC/IM: CPT

## 2019-09-04 PROCEDURE — 80048 BASIC METABOLIC PNL TOTAL CA: CPT

## 2019-09-04 PROCEDURE — 74176 CT ABD & PELVIS W/O CONTRAST: CPT

## 2019-09-04 PROCEDURE — 2500000003 HC RX 250 WO HCPCS: Performed by: STUDENT IN AN ORGANIZED HEALTH CARE EDUCATION/TRAINING PROGRAM

## 2019-09-04 PROCEDURE — 2500000003 HC RX 250 WO HCPCS: Performed by: EMERGENCY MEDICINE

## 2019-09-04 PROCEDURE — 83605 ASSAY OF LACTIC ACID: CPT

## 2019-09-04 PROCEDURE — 6360000002 HC RX W HCPCS: Performed by: EMERGENCY MEDICINE

## 2019-09-04 PROCEDURE — 36415 COLL VENOUS BLD VENIPUNCTURE: CPT

## 2019-09-04 PROCEDURE — G0108 DIAB MANAGE TRN  PER INDIV: HCPCS

## 2019-09-04 RX ORDER — BISACODYL 10 MG
10 SUPPOSITORY, RECTAL RECTAL DAILY
Status: DISCONTINUED | OUTPATIENT
Start: 2019-09-04 | End: 2019-09-05 | Stop reason: HOSPADM

## 2019-09-04 RX ORDER — PANTOPRAZOLE SODIUM 40 MG/1
40 TABLET, DELAYED RELEASE ORAL DAILY
Status: DISCONTINUED | OUTPATIENT
Start: 2019-09-04 | End: 2019-09-05 | Stop reason: HOSPADM

## 2019-09-04 RX ORDER — SODIUM PHOSPHATE, DIBASIC AND SODIUM PHOSPHATE, MONOBASIC 7; 19 G/133ML; G/133ML
1 ENEMA RECTAL
Status: ACTIVE | OUTPATIENT
Start: 2019-09-04 | End: 2019-09-04

## 2019-09-04 RX ORDER — LISINOPRIL 5 MG/1
5 TABLET ORAL
Status: DISCONTINUED | OUTPATIENT
Start: 2019-09-04 | End: 2019-09-05 | Stop reason: HOSPADM

## 2019-09-04 RX ORDER — RANITIDINE 150 MG/1
150 TABLET ORAL 2 TIMES DAILY
Status: DISCONTINUED | OUTPATIENT
Start: 2019-09-04 | End: 2019-09-05 | Stop reason: HOSPADM

## 2019-09-04 RX ORDER — 0.9 % SODIUM CHLORIDE 0.9 %
1000 INTRAVENOUS SOLUTION INTRAVENOUS ONCE
Status: COMPLETED | OUTPATIENT
Start: 2019-09-04 | End: 2019-09-04

## 2019-09-04 RX ORDER — SODIUM CHLORIDE 0.9 % (FLUSH) 0.9 %
10 SYRINGE (ML) INJECTION PRN
Status: DISCONTINUED | OUTPATIENT
Start: 2019-09-04 | End: 2019-09-05 | Stop reason: HOSPADM

## 2019-09-04 RX ORDER — QUETIAPINE FUMARATE 200 MG/1
400 TABLET, FILM COATED ORAL NIGHTLY PRN
Status: DISCONTINUED | OUTPATIENT
Start: 2019-09-04 | End: 2019-09-05 | Stop reason: HOSPADM

## 2019-09-04 RX ORDER — TIZANIDINE 4 MG/1
4 TABLET ORAL EVERY 6 HOURS PRN
Status: DISCONTINUED | OUTPATIENT
Start: 2019-09-04 | End: 2019-09-05 | Stop reason: HOSPADM

## 2019-09-04 RX ORDER — DOCUSATE SODIUM 100 MG/1
200 CAPSULE, LIQUID FILLED ORAL 2 TIMES DAILY
Status: DISCONTINUED | OUTPATIENT
Start: 2019-09-04 | End: 2019-09-05 | Stop reason: HOSPADM

## 2019-09-04 RX ORDER — BUPROPION HYDROCHLORIDE 150 MG/1
300 TABLET ORAL EVERY MORNING
Status: DISCONTINUED | OUTPATIENT
Start: 2019-09-04 | End: 2019-09-05 | Stop reason: HOSPADM

## 2019-09-04 RX ORDER — DILTIAZEM HYDROCHLORIDE 240 MG/1
240 CAPSULE, COATED, EXTENDED RELEASE ORAL DAILY
Status: DISCONTINUED | OUTPATIENT
Start: 2019-09-04 | End: 2019-09-05 | Stop reason: HOSPADM

## 2019-09-04 RX ORDER — DEXTROSE MONOHYDRATE 50 MG/ML
100 INJECTION, SOLUTION INTRAVENOUS PRN
Status: DISCONTINUED | OUTPATIENT
Start: 2019-09-04 | End: 2019-09-05 | Stop reason: HOSPADM

## 2019-09-04 RX ORDER — MORPHINE SULFATE 4 MG/ML
4 INJECTION, SOLUTION INTRAMUSCULAR; INTRAVENOUS EVERY 4 HOURS PRN
Status: DISCONTINUED | OUTPATIENT
Start: 2019-09-04 | End: 2019-09-05 | Stop reason: HOSPADM

## 2019-09-04 RX ORDER — MORPHINE SULFATE 4 MG/ML
2 INJECTION, SOLUTION INTRAMUSCULAR; INTRAVENOUS EVERY 4 HOURS PRN
Status: DISCONTINUED | OUTPATIENT
Start: 2019-09-04 | End: 2019-09-05 | Stop reason: HOSPADM

## 2019-09-04 RX ORDER — DEXTROSE MONOHYDRATE 25 G/50ML
12.5 INJECTION, SOLUTION INTRAVENOUS PRN
Status: DISCONTINUED | OUTPATIENT
Start: 2019-09-04 | End: 2019-09-05 | Stop reason: HOSPADM

## 2019-09-04 RX ORDER — ACETAMINOPHEN 500 MG
1000 TABLET ORAL EVERY 8 HOURS PRN
Status: DISCONTINUED | OUTPATIENT
Start: 2019-09-04 | End: 2019-09-05 | Stop reason: HOSPADM

## 2019-09-04 RX ORDER — NICOTINE POLACRILEX 4 MG
15 LOZENGE BUCCAL PRN
Status: DISCONTINUED | OUTPATIENT
Start: 2019-09-04 | End: 2019-09-05 | Stop reason: HOSPADM

## 2019-09-04 RX ORDER — PROMETHAZINE HYDROCHLORIDE 25 MG/ML
6.25 INJECTION, SOLUTION INTRAMUSCULAR; INTRAVENOUS EVERY 6 HOURS PRN
Status: DISCONTINUED | OUTPATIENT
Start: 2019-09-04 | End: 2019-09-05 | Stop reason: HOSPADM

## 2019-09-04 RX ORDER — FONDAPARINUX SODIUM 5 MG/.4ML
10 INJECTION SUBCUTANEOUS DAILY
Status: DISCONTINUED | OUTPATIENT
Start: 2019-09-04 | End: 2019-09-05 | Stop reason: HOSPADM

## 2019-09-04 RX ORDER — INSULIN GLARGINE 100 [IU]/ML
44 INJECTION, SOLUTION SUBCUTANEOUS NIGHTLY
Status: DISCONTINUED | OUTPATIENT
Start: 2019-09-04 | End: 2019-09-05 | Stop reason: HOSPADM

## 2019-09-04 RX ORDER — SODIUM CHLORIDE 0.9 % (FLUSH) 0.9 %
10 SYRINGE (ML) INJECTION EVERY 12 HOURS SCHEDULED
Status: DISCONTINUED | OUTPATIENT
Start: 2019-09-04 | End: 2019-09-05 | Stop reason: HOSPADM

## 2019-09-04 RX ADMIN — POLYETHYLENE GLYCOL 3350, SODIUM SULFATE ANHYDROUS, SODIUM BICARBONATE, SODIUM CHLORIDE, POTASSIUM CHLORIDE 4000 ML: 236; 22.74; 6.74; 5.86; 2.97 POWDER, FOR SOLUTION ORAL at 08:34

## 2019-09-04 RX ADMIN — PANTOPRAZOLE SODIUM 40 MG: 40 TABLET, DELAYED RELEASE ORAL at 08:34

## 2019-09-04 RX ADMIN — DOCUSATE SODIUM 200 MG: 100 CAPSULE, LIQUID FILLED ORAL at 21:32

## 2019-09-04 RX ADMIN — PROMETHAZINE HYDROCHLORIDE 6.25 MG: 25 INJECTION INTRAMUSCULAR; INTRAVENOUS at 13:01

## 2019-09-04 RX ADMIN — FONDAPARINUX SODIUM 10 MG: 5 INJECTION, SOLUTION SUBCUTANEOUS at 21:32

## 2019-09-04 RX ADMIN — MORPHINE SULFATE 4 MG: 4 INJECTION INTRAVENOUS at 08:46

## 2019-09-04 RX ADMIN — POTASSIUM CHLORIDE, DEXTROSE MONOHYDRATE AND SODIUM CHLORIDE: 150; 5; 450 INJECTION, SOLUTION INTRAVENOUS at 08:44

## 2019-09-04 RX ADMIN — TIZANIDINE 4 MG: 4 TABLET ORAL at 21:31

## 2019-09-04 RX ADMIN — SODIUM CHLORIDE 1000 ML: 9 INJECTION, SOLUTION INTRAVENOUS at 12:10

## 2019-09-04 RX ADMIN — MORPHINE SULFATE 4 MG: 4 INJECTION INTRAVENOUS at 17:25

## 2019-09-04 RX ADMIN — MORPHINE SULFATE 4 MG: 4 INJECTION INTRAVENOUS at 13:04

## 2019-09-04 RX ADMIN — BISACODYL 10 MG: 10 SUPPOSITORY RECTAL at 00:05

## 2019-09-04 RX ADMIN — MORPHINE SULFATE 4 MG: 4 INJECTION INTRAVENOUS at 00:19

## 2019-09-04 RX ADMIN — TIZANIDINE 4 MG: 4 TABLET ORAL at 08:35

## 2019-09-04 RX ADMIN — INSULIN LISPRO 2 UNITS: 100 INJECTION, SOLUTION INTRAVENOUS; SUBCUTANEOUS at 21:41

## 2019-09-04 RX ADMIN — INSULIN GLARGINE 44 UNITS: 100 INJECTION, SOLUTION SUBCUTANEOUS at 21:31

## 2019-09-04 RX ADMIN — RANITIDINE 150 MG: 150 TABLET ORAL at 01:53

## 2019-09-04 RX ADMIN — MAGNESIUM HYDROXIDE 30 ML: 400 SUSPENSION ORAL at 22:28

## 2019-09-04 RX ADMIN — BUPROPION HYDROCHLORIDE 300 MG: 150 TABLET, EXTENDED RELEASE ORAL at 08:35

## 2019-09-04 RX ADMIN — RANITIDINE 150 MG: 150 TABLET ORAL at 21:33

## 2019-09-04 RX ADMIN — PROMETHAZINE HYDROCHLORIDE 6.25 MG: 25 INJECTION INTRAMUSCULAR; INTRAVENOUS at 17:25

## 2019-09-04 RX ADMIN — QUETIAPINE FUMARATE 400 MG: 200 TABLET ORAL at 01:33

## 2019-09-04 RX ADMIN — INSULIN LISPRO 4 UNITS: 100 INJECTION, SOLUTION INTRAVENOUS; SUBCUTANEOUS at 17:25

## 2019-09-04 RX ADMIN — POTASSIUM CHLORIDE, DEXTROSE MONOHYDRATE AND SODIUM CHLORIDE: 150; 5; 450 INJECTION, SOLUTION INTRAVENOUS at 18:25

## 2019-09-04 RX ADMIN — DILTIAZEM HYDROCHLORIDE 240 MG: 240 CAPSULE, COATED, EXTENDED RELEASE ORAL at 08:34

## 2019-09-04 RX ADMIN — MORPHINE SULFATE 4 MG: 4 INJECTION INTRAVENOUS at 22:28

## 2019-09-04 RX ADMIN — RANITIDINE 150 MG: 150 TABLET ORAL at 08:35

## 2019-09-04 RX ADMIN — MAGNESIUM HYDROXIDE 30 ML: 400 SUSPENSION ORAL at 01:33

## 2019-09-04 RX ADMIN — BISACODYL 10 MG: 10 SUPPOSITORY RECTAL at 08:35

## 2019-09-04 RX ADMIN — MORPHINE SULFATE 4 MG: 4 INJECTION INTRAVENOUS at 04:41

## 2019-09-04 RX ADMIN — DOCUSATE SODIUM 200 MG: 100 CAPSULE, LIQUID FILLED ORAL at 08:34

## 2019-09-04 RX ADMIN — QUETIAPINE FUMARATE 400 MG: 200 TABLET ORAL at 21:38

## 2019-09-04 RX ADMIN — DOCUSATE SODIUM 200 MG: 100 CAPSULE, LIQUID FILLED ORAL at 01:33

## 2019-09-04 ASSESSMENT — PAIN DESCRIPTION - PAIN TYPE: TYPE: ACUTE PAIN

## 2019-09-04 ASSESSMENT — PAIN SCALES - GENERAL
PAINLEVEL_OUTOF10: 7
PAINLEVEL_OUTOF10: 6
PAINLEVEL_OUTOF10: 7
PAINLEVEL_OUTOF10: 6
PAINLEVEL_OUTOF10: 8
PAINLEVEL_OUTOF10: 7

## 2019-09-04 ASSESSMENT — PAIN DESCRIPTION - ORIENTATION: ORIENTATION: RIGHT

## 2019-09-04 ASSESSMENT — PAIN DESCRIPTION - LOCATION: LOCATION: ABDOMEN

## 2019-09-04 NOTE — H&P
use drugs. I have reviewed and agree with all Social.  There are no concerns for substance abuse/use. PHYSICAL EXAM     INITIAL VITALS:  height is 5' 8\" (1.727 m) and weight is 305 lb (138.3 kg) (abnormal). Her oral temperature is 96.8 °F (36 °C). Her blood pressure is 102/69 and her pulse is 86. Her respiration is 18 and oxygen saturation is 94%. CONSTITUTIONAL: AOx4, no apparent distress, appears stated age    HEAD: normocephalic, atraumatic   EYES: PERRLA, EOMI    ENT: moist mucous membranes, uvula midline   NECK: supple, symmetric   BACK: symmetric   LUNGS: clear to auscultation bilaterally   CARDIOVASCULAR: regular rate and rhythm, no murmurs, rubs or gallops   ABDOMEN: soft, tenderness in the right lower quadrant, suprapubic area and left lower quadrant non-distended with normal active bowel sounds   NEUROLOGIC:  MAEx4, no focal sensory or motor deficits   MUSCULOSKELETAL: no clubbing, cyanosis or edema   SKIN: no rash or wounds       DIFFERENTIAL DIAGNOSIS/MDM:       Abdominal Pain:  DDX: GERD, PUD, pancreatitis, cholecystitis, GB colic, cholangitis, [id number], ACS/ MI, pneumonia, SBO, DKA, AAA, mesenteric ischemia, perforated viscous, acute gastroenteritis, pyelonephritis, kidney stone, appendicitis, hernia, ectopic, ovarian torsion, ovarian cyst, PID, Mittelschmerz, period/ fibroid, UTI, constipation,      DIAGNOSTIC RESULTS       RADIOLOGY:   I directly visualized the following  images and reviewed the radiologist interpretations:    Xr Acute Abd Series Chest 1 Vw    Result Date: 9/3/2019  EXAMINATION: TWO XRAY VIEWS OF THE ABDOMEN AND SINGLE  XRAY VIEW OF THE CHEST 9/3/2019 6:24 pm COMPARISON: CT chest June 19, 2019 CT abdomen and pelvis September 1, 2019 HISTORY: ORDERING SYSTEM PROVIDED HISTORY: Abdominal pain with multiple surgeries including total abdominal hysterectomy and rule out obstruction.  TECHNOLOGIST PROVIDED HISTORY: Abdominal pain with multiple surgeries including total

## 2019-09-04 NOTE — CARE COORDINATION
Case Management Initial Discharge Plan  Roby Villatoro,             Met with:patient to discuss discharge plans. Information verified: address, contacts, phone number, , insurance Yes  PCP: Deb Ricardo MD  Date of last visit: 19    Insurance Provider: Medicare and Medicaid    Discharge Planning    Living Arrangements:  Spouse/Significant Other   Support Systems:  Spouse/Significant Other, Family Members    Home has 1 stories  5 stairs to climb to get into front door,   Patient able to perform ADL's:Independent    Current Services (outpatient & in home) none  DME equipment: glucometer and heart rate machine  DME provider:     Pharmacy: [facility] on [address] [place] [address] Medications:  No  Does patient want to participate in local refill/ meds to beds program?  No    Potential Assistance Needed:  N/A    Patient agreeable to home care:   [place] of choice provided:  n/a    Prior SNF/Rehab Placement and Facility:   Agreeable to SNF/Rehab: No  [place] of choice provided: n/a   Evaluation: no    Expected Discharge date:  19  Patient expects to be discharged to:  home  Follow Up Appointment: Best Day/ Time: Monday AM    Transportation provider: self  Transportation arrangements needed for discharge: No, will have a ride home    Readmission Risk              Risk of Unplanned Readmission:        31             Does patient have a readmission risk score greater than 14?: Yes  If yes, follow-up appointment must be made within 7 days of discharge. Discharge Plan: return to home, no skilled needs identified at present time.           Electronically signed by Madan Sanders RN on 19 at 4:10 PM

## 2019-09-05 VITALS
WEIGHT: 293 LBS | SYSTOLIC BLOOD PRESSURE: 112 MMHG | BODY MASS INDEX: 44.41 KG/M2 | TEMPERATURE: 98.2 F | OXYGEN SATURATION: 97 % | DIASTOLIC BLOOD PRESSURE: 72 MMHG | HEIGHT: 68 IN | HEART RATE: 96 BPM | RESPIRATION RATE: 18 BRPM

## 2019-09-05 LAB
GLUCOSE BLD-MCNC: 177 MG/DL (ref 65–105)
GLUCOSE BLD-MCNC: 237 MG/DL (ref 65–105)

## 2019-09-05 PROCEDURE — 6370000000 HC RX 637 (ALT 250 FOR IP): Performed by: STUDENT IN AN ORGANIZED HEALTH CARE EDUCATION/TRAINING PROGRAM

## 2019-09-05 PROCEDURE — 6360000002 HC RX W HCPCS: Performed by: EMERGENCY MEDICINE

## 2019-09-05 PROCEDURE — 2500000003 HC RX 250 WO HCPCS: Performed by: EMERGENCY MEDICINE

## 2019-09-05 PROCEDURE — 6370000000 HC RX 637 (ALT 250 FOR IP): Performed by: EMERGENCY MEDICINE

## 2019-09-05 PROCEDURE — 96376 TX/PRO/DX INJ SAME DRUG ADON: CPT

## 2019-09-05 PROCEDURE — 82947 ASSAY GLUCOSE BLOOD QUANT: CPT

## 2019-09-05 PROCEDURE — 96372 THER/PROPH/DIAG INJ SC/IM: CPT

## 2019-09-05 PROCEDURE — G0378 HOSPITAL OBSERVATION PER HR: HCPCS

## 2019-09-05 PROCEDURE — 2580000003 HC RX 258: Performed by: EMERGENCY MEDICINE

## 2019-09-05 RX ORDER — ONDANSETRON 2 MG/ML
4 INJECTION INTRAMUSCULAR; INTRAVENOUS EVERY 6 HOURS PRN
Status: DISCONTINUED | OUTPATIENT
Start: 2019-09-05 | End: 2019-09-05 | Stop reason: HOSPADM

## 2019-09-05 RX ADMIN — PANTOPRAZOLE SODIUM 40 MG: 40 TABLET, DELAYED RELEASE ORAL at 08:54

## 2019-09-05 RX ADMIN — BUPROPION HYDROCHLORIDE 300 MG: 150 TABLET, EXTENDED RELEASE ORAL at 08:54

## 2019-09-05 RX ADMIN — RANITIDINE 150 MG: 150 TABLET ORAL at 08:58

## 2019-09-05 RX ADMIN — DOCUSATE SODIUM 200 MG: 100 CAPSULE, LIQUID FILLED ORAL at 08:54

## 2019-09-05 RX ADMIN — INSULIN LISPRO 2 UNITS: 100 INJECTION, SOLUTION INTRAVENOUS; SUBCUTANEOUS at 08:59

## 2019-09-05 RX ADMIN — INSULIN LISPRO 4 UNITS: 100 INJECTION, SOLUTION INTRAVENOUS; SUBCUTANEOUS at 11:55

## 2019-09-05 RX ADMIN — PROMETHAZINE HYDROCHLORIDE 6.25 MG: 25 INJECTION INTRAMUSCULAR; INTRAVENOUS at 09:08

## 2019-09-05 RX ADMIN — BISACODYL 10 MG: 10 SUPPOSITORY RECTAL at 08:57

## 2019-09-05 RX ADMIN — MORPHINE SULFATE 4 MG: 4 INJECTION INTRAVENOUS at 04:30

## 2019-09-05 RX ADMIN — POTASSIUM CHLORIDE, DEXTROSE MONOHYDRATE AND SODIUM CHLORIDE: 150; 5; 450 INJECTION, SOLUTION INTRAVENOUS at 09:14

## 2019-09-05 RX ADMIN — Medication 10 ML: at 08:56

## 2019-09-05 RX ADMIN — DILTIAZEM HYDROCHLORIDE 240 MG: 240 CAPSULE, COATED, EXTENDED RELEASE ORAL at 08:58

## 2019-09-05 RX ADMIN — MORPHINE SULFATE 4 MG: 4 INJECTION INTRAVENOUS at 09:09

## 2019-09-05 RX ADMIN — LISINOPRIL 5 MG: 5 TABLET ORAL at 11:56

## 2019-09-05 ASSESSMENT — PAIN SCALES - GENERAL
PAINLEVEL_OUTOF10: 2
PAINLEVEL_OUTOF10: 7
PAINLEVEL_OUTOF10: 6

## 2019-09-05 ASSESSMENT — PAIN DESCRIPTION - PROGRESSION
CLINICAL_PROGRESSION: NOT CHANGED

## 2019-09-05 NOTE — PROGRESS NOTES
OBS/CDU   RESIDENT NOTE      Patients PCP is:  Rusty Yap MD        SUBJECTIVE      No acute events overnight. Received Dulcolax, suppositories, milk of magnesia, GoLYTELY, and enema all without resulting bowel movement. Still complaining abdominal pain. Patient was requesting narcotics, however due to constipation we explained her pain would be controlled with non-opiate analgesia. Ultimately patient decided to leave [address] We explained to her that we hoped she would stay for gastroenterology evaluation, however patient declined. PHYSICAL EXAM      General: NAD, AO X 3  Heent: EMOI, PERRL  Neck: SUPPLE, NO JVD  Cardiovascular: RRR, S1S2  Pulmonary: CTAB, NO SOB  Abdomen: SOFT, NTTP, ND, +BS  Extremities: +2/4 PULSES DISTAL, NO SWELLING  Neuro / Psych: NO NUMBNESS OR TINGLING, MENTATION AT BASELINE    PERTINENT TEST /EXAMS      I have reviewed all available laboratory results. MEDICATIONS CURRENT     No current facility-administered medications for this encounter. All medication charted and reviewed. CONSULTS      IP CONSULT TO DIABETES EDUCATOR  IP CONSULT TO GI    ASSESSMENT/PLAN       Aminta Quick is a 35 y.o. female who presents with  1. Acute constipation, associated with nausea vomiting  Patient was admitted to the observation unit for bowel regiment cleanout. Patient was placed on IV fluids, IV nausea control and pain control, stool softeners, suppository.      Patient had CT scan 2 days ago which did not demonstrate a small bowel obstruction, abdominal series performed in emergency department shows large stool burden.        They plan for GoLYTELY, GI/surgery consult if symptoms are not improving throughout the morning.       Patient left AGAINST MEDICAL ADVICE after being denied opiate analgesia    · Continue home medications  · Monitor vitals, labs, and imaging  · DISPO: pending consults and clinical improvement    --  Freddie Brisbane  Emergency Medicine Resident
Physician
to leave.       Carol Zeng MD  Attending Emergency  Physician

## 2019-09-05 NOTE — DISCHARGE SUMMARY
postsurgical biliary dilatation. The common bile duct, pancreas, spleen, and adrenals are normal.  The bilateral kidneys demonstrate symmetric perinephric stranding. No hydronephrosis or nephrolithiasis. Left inferior pole parapelvic cysts are stable. The bilateral ureters are normal.  Inferior vena cava filter in stable position. Stable postsurgical change related to prior gastric bypass surgery with extensive bowel adhesions. No evidence of small bowel obstruction. There is enteric contrast in the colon extending to the level of the rectum. No acute colonic abnormality. No ascites or free air. No focal mesenteric inflammatory changes. Pelvis: The bladder and rectum are normal.  No ascites. Musculoskeletal structures:  Stable significant postsurgical rectus diastasis with midline abdominal eventration. No diffuse body wall edema. Anterior abdominal wall subcutaneous multifocal irregular nodules likely representing injection granulomas. No loculated fluid collection. No significant inguinal lymphadenopathy. Normal bone mineral density. Normal lumbar spine alignment with multilevel degenerative changes. Normal pelvic alignment with symmetric hip arthropathy. No acute osseous abnormality. There are several stable sclerotic foci of the pelvis compared to the prior exam.     1. Interval development of bilateral lower lobe peribronchovascular patchy opacities which suggest infectious bronchopneumonia versus possible aspiration pneumonitis. 2. Stable changes related to prior partial left hepatic wedge resection and gastric bypass surgery with extensive adhesions. 3. No acute abdominal/pelvic inflammatory process or obstruction. Contrast related to the prior exam on 09/01/2019 has progressed into the colon to the level of the rectosigmoid junction. No significant fecal retention.      Xr Acute Abd Series Chest 1 Vw    Result Date: 9/3/2019  EXAMINATION: TWO XRAY VIEWS OF THE ABDOMEN AND SINGLE  XRAY VIEW

## 2019-11-03 ENCOUNTER — APPOINTMENT (OUTPATIENT)
Dept: CT IMAGING | Age: 39
End: 2019-11-03
Payer: MEDICARE

## 2019-11-03 ENCOUNTER — HOSPITAL ENCOUNTER (EMERGENCY)
Age: 39
Discharge: HOME | End: 2019-11-03
Attending: EMERGENCY MEDICINE
Payer: MEDICARE

## 2019-11-03 VITALS
TEMPERATURE: 97.8 F | WEIGHT: 290 LBS | HEIGHT: 68 IN | RESPIRATION RATE: 18 BRPM | HEART RATE: 91 BPM | OXYGEN SATURATION: 96 % | BODY MASS INDEX: 43.95 KG/M2 | SYSTOLIC BLOOD PRESSURE: 127 MMHG | DIASTOLIC BLOOD PRESSURE: 87 MMHG

## 2019-11-03 DIAGNOSIS — R11.2 NAUSEA AND VOMITING, INTRACTABILITY OF VOMITING NOT SPECIFIED, UNSPECIFIED VOMITING TYPE: ICD-10-CM

## 2019-11-03 DIAGNOSIS — R10.9 ABDOMINAL PAIN, UNSPECIFIED ABDOMINAL LOCATION: Primary | ICD-10-CM

## 2019-11-03 PROCEDURE — 99284 EMERGENCY DEPT VISIT MOD MDM: CPT

## 2019-11-03 PROCEDURE — 96374 THER/PROPH/DIAG INJ IV PUSH: CPT

## 2019-11-03 PROCEDURE — 6360000002 HC RX W HCPCS: Performed by: EMERGENCY MEDICINE

## 2019-11-03 PROCEDURE — 6360000002 HC RX W HCPCS: Performed by: STUDENT IN AN ORGANIZED HEALTH CARE EDUCATION/TRAINING PROGRAM

## 2019-11-03 PROCEDURE — 74176 CT ABD & PELVIS W/O CONTRAST: CPT

## 2019-11-03 PROCEDURE — 2580000003 HC RX 258: Performed by: STUDENT IN AN ORGANIZED HEALTH CARE EDUCATION/TRAINING PROGRAM

## 2019-11-03 PROCEDURE — 83690 ASSAY OF LIPASE: CPT

## 2019-11-03 PROCEDURE — 96375 TX/PRO/DX INJ NEW DRUG ADDON: CPT

## 2019-11-03 PROCEDURE — 80053 COMPREHEN METABOLIC PANEL: CPT

## 2019-11-03 PROCEDURE — 85025 COMPLETE CBC W/AUTO DIFF WBC: CPT

## 2019-11-03 RX ORDER — PROMETHAZINE HYDROCHLORIDE 25 MG/ML
12.5 INJECTION, SOLUTION INTRAMUSCULAR; INTRAVENOUS ONCE
Status: COMPLETED | OUTPATIENT
Start: 2019-11-03 | End: 2019-11-03

## 2019-11-03 RX ORDER — DICYCLOMINE HYDROCHLORIDE 10 MG/ML
20 INJECTION INTRAMUSCULAR ONCE
Status: COMPLETED | OUTPATIENT
Start: 2019-11-03 | End: 2019-11-03

## 2019-11-03 RX ORDER — DICYCLOMINE HYDROCHLORIDE 10 MG/1
10 CAPSULE ORAL EVERY 6 HOURS PRN
Qty: 20 CAPSULE | Refills: 0 | Status: SHIPPED | OUTPATIENT
Start: 2019-11-03 | End: 2022-06-02

## 2019-11-03 RX ORDER — SODIUM CHLORIDE, SODIUM LACTATE, POTASSIUM CHLORIDE, AND CALCIUM CHLORIDE .6; .31; .03; .02 G/100ML; G/100ML; G/100ML; G/100ML
1000 INJECTION, SOLUTION INTRAVENOUS ONCE
Status: COMPLETED | OUTPATIENT
Start: 2019-11-03 | End: 2019-11-03

## 2019-11-03 RX ORDER — FENTANYL CITRATE 50 UG/ML
25 INJECTION, SOLUTION INTRAMUSCULAR; INTRAVENOUS ONCE
Status: COMPLETED | OUTPATIENT
Start: 2019-11-03 | End: 2019-11-03

## 2019-11-03 RX ORDER — PROMETHAZINE HYDROCHLORIDE 25 MG/1
25 TABLET ORAL EVERY 6 HOURS PRN
Qty: 15 TABLET | Refills: 0 | Status: SHIPPED | OUTPATIENT
Start: 2019-11-03 | End: 2019-11-10

## 2019-11-03 RX ADMIN — PROMETHAZINE HYDROCHLORIDE 12.5 MG: 25 INJECTION INTRAMUSCULAR; INTRAVENOUS at 15:29

## 2019-11-03 RX ADMIN — DICYCLOMINE HYDROCHLORIDE 20 MG: 10 INJECTION INTRAMUSCULAR at 15:26

## 2019-11-03 RX ADMIN — FENTANYL CITRATE 25 MCG: 50 INJECTION, SOLUTION INTRAMUSCULAR; INTRAVENOUS at 16:22

## 2019-11-03 RX ADMIN — SODIUM CHLORIDE, POTASSIUM CHLORIDE, SODIUM LACTATE AND CALCIUM CHLORIDE 1000 ML: 600; 310; 30; 20 INJECTION, SOLUTION INTRAVENOUS at 15:24

## 2019-11-03 ASSESSMENT — PAIN SCALES - GENERAL
PAINLEVEL_OUTOF10: 4
PAINLEVEL_OUTOF10: 7

## 2019-11-03 ASSESSMENT — PAIN DESCRIPTION - PAIN TYPE: TYPE: ACUTE PAIN

## 2019-11-03 ASSESSMENT — PAIN DESCRIPTION - LOCATION: LOCATION: ABDOMEN

## 2019-11-04 LAB
ABSOLUTE EOS #: 0.15 K/UL (ref 0–0.44)
ABSOLUTE IMMATURE GRANULOCYTE: <0.03 K/UL (ref 0–0.3)
ABSOLUTE LYMPH #: 1.76 K/UL (ref 1.1–3.7)
ABSOLUTE MONO #: 0.51 K/UL (ref 0.1–1.2)
ALBUMIN SERPL-MCNC: 3.7 G/DL (ref 3.5–5.2)
ALBUMIN/GLOBULIN RATIO: 1 (ref 1–2.5)
ALP BLD-CCNC: 184 U/L (ref 35–104)
ALT SERPL-CCNC: 56 U/L (ref 5–33)
ANION GAP SERPL CALCULATED.3IONS-SCNC: 12 MMOL/L (ref 9–17)
AST SERPL-CCNC: 21 U/L
BASOPHILS # BLD: 1 % (ref 0–2)
BASOPHILS ABSOLUTE: 0.05 K/UL (ref 0–0.2)
BILIRUB SERPL-MCNC: <0.1 MG/DL (ref 0.3–1.2)
BUN BLDV-MCNC: 12 MG/DL (ref 6–20)
BUN/CREAT BLD: ABNORMAL (ref 9–20)
CALCIUM SERPL-MCNC: 9.1 MG/DL (ref 8.6–10.4)
CHLORIDE BLD-SCNC: 106 MMOL/L (ref 98–107)
CO2: 21 MMOL/L (ref 20–31)
CREAT SERPL-MCNC: 0.64 MG/DL (ref 0.5–0.9)
DIFFERENTIAL TYPE: ABNORMAL
EOSINOPHILS RELATIVE PERCENT: 2 % (ref 1–4)
GFR AFRICAN AMERICAN: >60 ML/MIN
GFR NON-AFRICAN AMERICAN: >60 ML/MIN
GFR SERPL CREATININE-BSD FRML MDRD: ABNORMAL ML/MIN/{1.73_M2}
GFR SERPL CREATININE-BSD FRML MDRD: ABNORMAL ML/MIN/{1.73_M2}
GLUCOSE BLD-MCNC: 163 MG/DL (ref 70–99)
HCT VFR BLD CALC: 45.6 % (ref 36.3–47.1)
HEMOGLOBIN: 14.2 G/DL (ref 11.9–15.1)
IMMATURE GRANULOCYTES: 0 %
LIPASE: 15 U/L (ref 13–60)
LYMPHOCYTES # BLD: 24 % (ref 24–43)
MCH RBC QN AUTO: 29.3 PG (ref 25.2–33.5)
MCHC RBC AUTO-ENTMCNC: 31.1 G/DL (ref 28.4–34.8)
MCV RBC AUTO: 94.2 FL (ref 82.6–102.9)
MONOCYTES # BLD: 7 % (ref 3–12)
NRBC AUTOMATED: 0 PER 100 WBC
PDW BLD-RTO: 12.6 % (ref 11.8–14.4)
PLATELET # BLD: 256 K/UL (ref 138–453)
PLATELET ESTIMATE: ABNORMAL
PMV BLD AUTO: 12.2 FL (ref 8.1–13.5)
POTASSIUM SERPL-SCNC: 4 MMOL/L (ref 3.7–5.3)
RBC # BLD: 4.84 M/UL (ref 3.95–5.11)
RBC # BLD: ABNORMAL 10*6/UL
SEG NEUTROPHILS: 67 % (ref 36–65)
SEGMENTED NEUTROPHILS ABSOLUTE COUNT: 4.96 K/UL (ref 1.5–8.1)
SODIUM BLD-SCNC: 139 MMOL/L (ref 135–144)
TOTAL PROTEIN: 7.5 G/DL (ref 6.4–8.3)
WBC # BLD: 7.5 K/UL (ref 3.5–11.3)
WBC # BLD: ABNORMAL 10*3/UL

## 2019-12-01 ENCOUNTER — HOSPITAL ENCOUNTER (EMERGENCY)
Age: 39
Discharge: HOME | End: 2019-12-01
Attending: EMERGENCY MEDICINE
Payer: MEDICARE

## 2019-12-01 ENCOUNTER — APPOINTMENT (OUTPATIENT)
Dept: CT IMAGING | Age: 39
End: 2019-12-01
Payer: MEDICARE

## 2019-12-01 VITALS
RESPIRATION RATE: 16 BRPM | WEIGHT: 293 LBS | SYSTOLIC BLOOD PRESSURE: 107 MMHG | DIASTOLIC BLOOD PRESSURE: 65 MMHG | TEMPERATURE: 97.8 F | HEIGHT: 68 IN | BODY MASS INDEX: 44.41 KG/M2 | OXYGEN SATURATION: 98 % | HEART RATE: 88 BPM

## 2019-12-01 DIAGNOSIS — J18.9 COMMUNITY ACQUIRED PNEUMONIA, UNSPECIFIED LATERALITY: Primary | ICD-10-CM

## 2019-12-01 LAB
ABSOLUTE EOS #: 0.2 K/UL (ref 0–0.4)
ABSOLUTE IMMATURE GRANULOCYTE: NORMAL K/UL (ref 0–0.3)
ABSOLUTE LYMPH #: 2.2 K/UL (ref 1–4.8)
ABSOLUTE MONO #: 0.6 K/UL (ref 0.1–1.3)
ALBUMIN SERPL-MCNC: 3.4 G/DL (ref 3.5–5.2)
ALBUMIN/GLOBULIN RATIO: ABNORMAL (ref 1–2.5)
ALP BLD-CCNC: 149 U/L (ref 35–104)
ALT SERPL-CCNC: 12 U/L (ref 5–33)
ANION GAP SERPL CALCULATED.3IONS-SCNC: 15 MMOL/L (ref 9–17)
AST SERPL-CCNC: 10 U/L
BASOPHILS # BLD: 1 % (ref 0–2)
BASOPHILS ABSOLUTE: 0.1 K/UL (ref 0–0.2)
BILIRUB SERPL-MCNC: <0.15 MG/DL (ref 0.3–1.2)
BUN BLDV-MCNC: 12 MG/DL (ref 6–20)
BUN/CREAT BLD: ABNORMAL (ref 9–20)
CALCIUM SERPL-MCNC: 9.2 MG/DL (ref 8.6–10.4)
CHLORIDE BLD-SCNC: 105 MMOL/L (ref 98–107)
CO2: 20 MMOL/L (ref 20–31)
CREAT SERPL-MCNC: 0.57 MG/DL (ref 0.5–0.9)
DIFFERENTIAL TYPE: NORMAL
EOSINOPHILS RELATIVE PERCENT: 2 % (ref 0–4)
GFR AFRICAN AMERICAN: >60 ML/MIN
GFR NON-AFRICAN AMERICAN: >60 ML/MIN
GFR SERPL CREATININE-BSD FRML MDRD: ABNORMAL ML/MIN/{1.73_M2}
GFR SERPL CREATININE-BSD FRML MDRD: ABNORMAL ML/MIN/{1.73_M2}
GLUCOSE BLD-MCNC: 151 MG/DL (ref 70–99)
HCT VFR BLD CALC: 43 % (ref 36–46)
HEMOGLOBIN: 13.8 G/DL (ref 12–16)
IMMATURE GRANULOCYTES: NORMAL %
LACTIC ACID, SEPSIS WHOLE BLOOD: NORMAL MMOL/L (ref 0.5–1.9)
LACTIC ACID, SEPSIS: 1.4 MMOL/L (ref 0.5–1.9)
LIPASE: 21 U/L (ref 13–60)
LYMPHOCYTES # BLD: 27 % (ref 24–44)
MCH RBC QN AUTO: 29.9 PG (ref 26–34)
MCHC RBC AUTO-ENTMCNC: 32.1 G/DL (ref 31–37)
MCV RBC AUTO: 93.3 FL (ref 80–100)
MONOCYTES # BLD: 7 % (ref 1–7)
NRBC AUTOMATED: NORMAL PER 100 WBC
PDW BLD-RTO: 13.5 % (ref 11.5–14.9)
PLATELET # BLD: 295 K/UL (ref 150–450)
PLATELET ESTIMATE: NORMAL
PMV BLD AUTO: 9.6 FL (ref 6–12)
POTASSIUM SERPL-SCNC: 4.7 MMOL/L (ref 3.7–5.3)
RBC # BLD: 4.61 M/UL (ref 4–5.2)
RBC # BLD: NORMAL 10*6/UL
SEG NEUTROPHILS: 63 % (ref 36–66)
SEGMENTED NEUTROPHILS ABSOLUTE COUNT: 5.2 K/UL (ref 1.3–9.1)
SODIUM BLD-SCNC: 140 MMOL/L (ref 135–144)
TOTAL PROTEIN: 6.9 G/DL (ref 6.4–8.3)
WBC # BLD: 8.3 K/UL (ref 3.5–11)
WBC # BLD: NORMAL 10*3/UL

## 2019-12-01 PROCEDURE — 96374 THER/PROPH/DIAG INJ IV PUSH: CPT

## 2019-12-01 PROCEDURE — 36415 COLL VENOUS BLD VENIPUNCTURE: CPT

## 2019-12-01 PROCEDURE — 85025 COMPLETE CBC W/AUTO DIFF WBC: CPT

## 2019-12-01 PROCEDURE — 96372 THER/PROPH/DIAG INJ SC/IM: CPT

## 2019-12-01 PROCEDURE — 6360000002 HC RX W HCPCS: Performed by: EMERGENCY MEDICINE

## 2019-12-01 PROCEDURE — 99284 EMERGENCY DEPT VISIT MOD MDM: CPT

## 2019-12-01 PROCEDURE — 2580000003 HC RX 258: Performed by: EMERGENCY MEDICINE

## 2019-12-01 PROCEDURE — 6370000000 HC RX 637 (ALT 250 FOR IP): Performed by: EMERGENCY MEDICINE

## 2019-12-01 PROCEDURE — 74177 CT ABD & PELVIS W/CONTRAST: CPT

## 2019-12-01 PROCEDURE — 83690 ASSAY OF LIPASE: CPT

## 2019-12-01 PROCEDURE — 80053 COMPREHEN METABOLIC PANEL: CPT

## 2019-12-01 PROCEDURE — 83605 ASSAY OF LACTIC ACID: CPT

## 2019-12-01 PROCEDURE — 6360000004 HC RX CONTRAST MEDICATION: Performed by: EMERGENCY MEDICINE

## 2019-12-01 RX ORDER — LEVOFLOXACIN 750 MG/1
750 TABLET ORAL DAILY
Qty: 4 TABLET | Refills: 0 | Status: SHIPPED | OUTPATIENT
Start: 2019-12-01 | End: 2019-12-05

## 2019-12-01 RX ORDER — SODIUM CHLORIDE 0.9 % (FLUSH) 0.9 %
10 SYRINGE (ML) INJECTION ONCE
Status: COMPLETED | OUTPATIENT
Start: 2019-12-01 | End: 2019-12-01

## 2019-12-01 RX ORDER — PROMETHAZINE HYDROCHLORIDE 25 MG/ML
25 INJECTION, SOLUTION INTRAMUSCULAR; INTRAVENOUS ONCE
Status: COMPLETED | OUTPATIENT
Start: 2019-12-01 | End: 2019-12-01

## 2019-12-01 RX ORDER — PROMETHAZINE HYDROCHLORIDE 25 MG/1
25 TABLET ORAL EVERY 6 HOURS PRN
Qty: 20 TABLET | Refills: 0 | Status: SHIPPED | OUTPATIENT
Start: 2019-12-01 | End: 2019-12-08

## 2019-12-01 RX ORDER — 0.9 % SODIUM CHLORIDE 0.9 %
80 INTRAVENOUS SOLUTION INTRAVENOUS ONCE
Status: COMPLETED | OUTPATIENT
Start: 2019-12-01 | End: 2019-12-01

## 2019-12-01 RX ORDER — OXYCODONE HYDROCHLORIDE AND ACETAMINOPHEN 5; 325 MG/1; MG/1
2 TABLET ORAL ONCE
Status: COMPLETED | OUTPATIENT
Start: 2019-12-01 | End: 2019-12-01

## 2019-12-01 RX ORDER — 0.9 % SODIUM CHLORIDE 0.9 %
1000 INTRAVENOUS SOLUTION INTRAVENOUS ONCE
Status: COMPLETED | OUTPATIENT
Start: 2019-12-01 | End: 2019-12-01

## 2019-12-01 RX ORDER — OXYCODONE HYDROCHLORIDE AND ACETAMINOPHEN 5; 325 MG/1; MG/1
1 TABLET ORAL ONCE
Status: DISCONTINUED | OUTPATIENT
Start: 2019-12-01 | End: 2019-12-01

## 2019-12-01 RX ORDER — MORPHINE SULFATE 4 MG/ML
4 INJECTION, SOLUTION INTRAMUSCULAR; INTRAVENOUS ONCE
Status: COMPLETED | OUTPATIENT
Start: 2019-12-01 | End: 2019-12-01

## 2019-12-01 RX ORDER — LEVOFLOXACIN 750 MG/1
750 TABLET ORAL ONCE
Status: COMPLETED | OUTPATIENT
Start: 2019-12-01 | End: 2019-12-01

## 2019-12-01 RX ADMIN — MORPHINE SULFATE 4 MG: 4 INJECTION, SOLUTION INTRAMUSCULAR; INTRAVENOUS at 15:54

## 2019-12-01 RX ADMIN — SODIUM CHLORIDE 80 ML: 9 INJECTION, SOLUTION INTRAVENOUS at 16:35

## 2019-12-01 RX ADMIN — IOVERSOL 75 ML: 741 INJECTION INTRA-ARTERIAL; INTRAVENOUS at 16:35

## 2019-12-01 RX ADMIN — PROMETHAZINE HYDROCHLORIDE 25 MG: 25 INJECTION INTRAMUSCULAR; INTRAVENOUS at 17:58

## 2019-12-01 RX ADMIN — Medication 10 ML: at 16:35

## 2019-12-01 RX ADMIN — LEVOFLOXACIN 750 MG: 750 TABLET, FILM COATED ORAL at 18:30

## 2019-12-01 RX ADMIN — SODIUM CHLORIDE 1000 ML: 9 INJECTION, SOLUTION INTRAVENOUS at 15:54

## 2019-12-01 RX ADMIN — OXYCODONE HYDROCHLORIDE AND ACETAMINOPHEN 2 TABLET: 5; 325 TABLET ORAL at 18:30

## 2019-12-01 ASSESSMENT — PAIN DESCRIPTION - LOCATION: LOCATION: ABDOMEN

## 2019-12-01 ASSESSMENT — ENCOUNTER SYMPTOMS
EYE PAIN: 0
EYE REDNESS: 0
CHEST TIGHTNESS: 0
NAUSEA: 1
BACK PAIN: 0
ABDOMINAL PAIN: 1
SHORTNESS OF BREATH: 0
COUGH: 0
DIARRHEA: 0
SORE THROAT: 0
VOMITING: 1
CONSTIPATION: 0

## 2019-12-01 ASSESSMENT — PAIN SCALES - GENERAL
PAINLEVEL_OUTOF10: 8
PAINLEVEL_OUTOF10: 5
PAINLEVEL_OUTOF10: 8

## 2019-12-01 ASSESSMENT — PAIN DESCRIPTION - PAIN TYPE: TYPE: ACUTE PAIN

## 2019-12-02 ENCOUNTER — TELEPHONE (OUTPATIENT)
Dept: FAMILY MEDICINE CLINIC | Age: 39
End: 2019-12-02

## 2019-12-05 ENCOUNTER — OFFICE VISIT (OUTPATIENT)
Dept: FAMILY MEDICINE CLINIC | Age: 39
End: 2019-12-05
Payer: MEDICARE

## 2019-12-05 VITALS
RESPIRATION RATE: 16 BRPM | DIASTOLIC BLOOD PRESSURE: 86 MMHG | WEIGHT: 293 LBS | OXYGEN SATURATION: 100 % | HEIGHT: 68 IN | BODY MASS INDEX: 44.41 KG/M2 | SYSTOLIC BLOOD PRESSURE: 128 MMHG | HEART RATE: 100 BPM

## 2019-12-05 DIAGNOSIS — F51.01 PRIMARY INSOMNIA: Chronic | ICD-10-CM

## 2019-12-05 DIAGNOSIS — A60.09 HERPES SIMPLEX INFECTION OF OTHER SITE OF GENITOURINARY TRACT: ICD-10-CM

## 2019-12-05 DIAGNOSIS — E66.01 MORBID OBESITY WITH BMI OF 45.0-49.9, ADULT (HCC): ICD-10-CM

## 2019-12-05 DIAGNOSIS — D68.61 ANTIPHOSPHOLIPID SYNDROME (HCC): ICD-10-CM

## 2019-12-05 DIAGNOSIS — K76.0 NAFLD (NONALCOHOLIC FATTY LIVER DISEASE): ICD-10-CM

## 2019-12-05 DIAGNOSIS — E78.1 HYPERTRIGLYCERIDEMIA: ICD-10-CM

## 2019-12-05 DIAGNOSIS — E66.01 MORBID OBESITY WITH BMI OF 50.0-59.9, ADULT (HCC): ICD-10-CM

## 2019-12-05 DIAGNOSIS — Z79.4 TYPE 2 DIABETES MELLITUS WITH DIABETIC POLYNEUROPATHY, WITH LONG-TERM CURRENT USE OF INSULIN (HCC): ICD-10-CM

## 2019-12-05 DIAGNOSIS — M85.89 OSTEOPENIA OF MULTIPLE SITES: ICD-10-CM

## 2019-12-05 DIAGNOSIS — G89.29 CHRONIC ABDOMINAL PAIN: ICD-10-CM

## 2019-12-05 DIAGNOSIS — E11.42 TYPE 2 DIABETES MELLITUS WITH DIABETIC POLYNEUROPATHY, WITH LONG-TERM CURRENT USE OF INSULIN (HCC): ICD-10-CM

## 2019-12-05 DIAGNOSIS — R60.9 PERIPHERAL EDEMA: ICD-10-CM

## 2019-12-05 DIAGNOSIS — D51.0 PERNICIOUS ANEMIA: Chronic | ICD-10-CM

## 2019-12-05 DIAGNOSIS — F19.11 HISTORY OF DRUG ABUSE (HCC): ICD-10-CM

## 2019-12-05 DIAGNOSIS — Z98.84 HISTORY OF GASTRIC BYPASS: ICD-10-CM

## 2019-12-05 DIAGNOSIS — R10.9 CHRONIC ABDOMINAL PAIN: ICD-10-CM

## 2019-12-05 DIAGNOSIS — K21.00 GASTROESOPHAGEAL REFLUX DISEASE WITH ESOPHAGITIS: ICD-10-CM

## 2019-12-05 DIAGNOSIS — J15.7 PNEUMONIA OF BOTH LOWER LOBES DUE TO MYCOPLASMA PNEUMONIAE: Primary | ICD-10-CM

## 2019-12-05 DIAGNOSIS — F31.70 BIPOLAR AFFECTIVE DISORDER IN REMISSION (HCC): ICD-10-CM

## 2019-12-05 DIAGNOSIS — I10 ESSENTIAL HYPERTENSION: ICD-10-CM

## 2019-12-05 DIAGNOSIS — Z76.89 ESTABLISHING CARE WITH NEW DOCTOR, ENCOUNTER FOR: ICD-10-CM

## 2019-12-05 DIAGNOSIS — F43.12 CHRONIC POST-TRAUMATIC STRESS DISORDER (PTSD): Chronic | ICD-10-CM

## 2019-12-05 PROBLEM — R60.0 PERIPHERAL EDEMA: Status: ACTIVE | Noted: 2019-12-05

## 2019-12-05 PROBLEM — A60.00 HERPES GENITALIS: Status: ACTIVE | Noted: 2019-12-05

## 2019-12-05 PROCEDURE — G8482 FLU IMMUNIZE ORDER/ADMIN: HCPCS | Performed by: FAMILY MEDICINE

## 2019-12-05 PROCEDURE — 2022F DILAT RTA XM EVC RTNOPTHY: CPT | Performed by: FAMILY MEDICINE

## 2019-12-05 PROCEDURE — 99204 OFFICE O/P NEW MOD 45 MIN: CPT | Performed by: FAMILY MEDICINE

## 2019-12-05 PROCEDURE — 3046F HEMOGLOBIN A1C LEVEL >9.0%: CPT | Performed by: FAMILY MEDICINE

## 2019-12-05 PROCEDURE — 1036F TOBACCO NON-USER: CPT | Performed by: FAMILY MEDICINE

## 2019-12-05 PROCEDURE — G8427 DOCREV CUR MEDS BY ELIG CLIN: HCPCS | Performed by: FAMILY MEDICINE

## 2019-12-05 PROCEDURE — G8417 CALC BMI ABV UP PARAM F/U: HCPCS | Performed by: FAMILY MEDICINE

## 2019-12-05 RX ORDER — LAMOTRIGINE 200 MG/1
200 TABLET ORAL DAILY
Status: ON HOLD | COMMUNITY
End: 2022-03-19 | Stop reason: HOSPADM

## 2019-12-05 RX ORDER — FAMOTIDINE 20 MG/1
20 TABLET, FILM COATED ORAL 2 TIMES DAILY
Qty: 60 TABLET | Refills: 1 | Status: SHIPPED | OUTPATIENT
Start: 2019-12-05 | End: 2020-03-04 | Stop reason: SDUPTHER

## 2019-12-05 RX ORDER — VALACYCLOVIR HYDROCHLORIDE 500 MG/1
500 TABLET, FILM COATED ORAL 2 TIMES DAILY
COMMUNITY
End: 2020-05-01 | Stop reason: SDUPTHER

## 2019-12-05 RX ORDER — ALBUTEROL SULFATE 90 UG/1
2 AEROSOL, METERED RESPIRATORY (INHALATION) 4 TIMES DAILY PRN
Qty: 3 INHALER | Refills: 1 | Status: SHIPPED | OUTPATIENT
Start: 2019-12-05 | End: 2020-08-25

## 2019-12-05 RX ORDER — BUMETANIDE 2 MG/1
2 TABLET ORAL DAILY
COMMUNITY
End: 2019-12-20 | Stop reason: SDUPTHER

## 2019-12-05 RX ORDER — BENAZEPRIL HYDROCHLORIDE 20 MG/1
20 TABLET ORAL DAILY
COMMUNITY
End: 2020-05-01 | Stop reason: SDUPTHER

## 2019-12-05 RX ORDER — PRAVASTATIN SODIUM 40 MG
40 TABLET ORAL DAILY
COMMUNITY
End: 2020-06-26

## 2019-12-05 RX ORDER — DEXTROAMPHETAMINE SACCHARATE, AMPHETAMINE ASPARTATE, DEXTROAMPHETAMINE SULFATE AND AMPHETAMINE SULFATE 7.5; 7.5; 7.5; 7.5 MG/1; MG/1; MG/1; MG/1
30 TABLET ORAL DAILY
COMMUNITY
End: 2022-06-02

## 2019-12-05 RX ORDER — AZITHROMYCIN 250 MG/1
500 TABLET, FILM COATED ORAL SEE ADMIN INSTRUCTIONS
Qty: 10 TABLET | Refills: 0 | Status: SHIPPED | OUTPATIENT
Start: 2019-12-05 | End: 2019-12-09 | Stop reason: ALTCHOICE

## 2019-12-05 ASSESSMENT — ENCOUNTER SYMPTOMS
VOMITING: 1
EYE PAIN: 0
WHEEZING: 1
SORE THROAT: 0
NAUSEA: 1
DIARRHEA: 0
SHORTNESS OF BREATH: 1
CONSTIPATION: 0
ABDOMINAL PAIN: 1
COLOR CHANGE: 0
TROUBLE SWALLOWING: 0
APNEA: 0
COUGH: 0
CHEST TIGHTNESS: 1

## 2019-12-09 ENCOUNTER — TELEPHONE (OUTPATIENT)
Dept: FAMILY MEDICINE CLINIC | Age: 39
End: 2019-12-09

## 2019-12-09 ENCOUNTER — HOSPITAL ENCOUNTER (OUTPATIENT)
Age: 39
Discharge: HOME | End: 2019-12-11
Payer: MEDICARE

## 2019-12-09 ENCOUNTER — OFFICE VISIT (OUTPATIENT)
Dept: FAMILY MEDICINE CLINIC | Age: 39
End: 2019-12-09
Payer: MEDICARE

## 2019-12-09 ENCOUNTER — HOSPITAL ENCOUNTER (OUTPATIENT)
Dept: GENERAL RADIOLOGY | Age: 39
Discharge: HOME | End: 2019-12-11
Payer: MEDICARE

## 2019-12-09 VITALS
SYSTOLIC BLOOD PRESSURE: 110 MMHG | WEIGHT: 293 LBS | HEIGHT: 68 IN | BODY MASS INDEX: 44.41 KG/M2 | DIASTOLIC BLOOD PRESSURE: 78 MMHG | TEMPERATURE: 97.3 F | HEART RATE: 98 BPM | OXYGEN SATURATION: 100 %

## 2019-12-09 DIAGNOSIS — J15.7 PNEUMONIA OF BOTH LOWER LOBES DUE TO MYCOPLASMA PNEUMONIAE: ICD-10-CM

## 2019-12-09 DIAGNOSIS — I88.9 LYMPHADENITIS: ICD-10-CM

## 2019-12-09 DIAGNOSIS — F10.21 ALCOHOL DEPENDENCE IN REMISSION (HCC): ICD-10-CM

## 2019-12-09 DIAGNOSIS — J15.7 PNEUMONIA OF BOTH LOWER LOBES DUE TO MYCOPLASMA PNEUMONIAE: Primary | ICD-10-CM

## 2019-12-09 DIAGNOSIS — F13.20 SEVERE BENZODIAZEPINE USE DISORDER (HCC): ICD-10-CM

## 2019-12-09 PROCEDURE — 99213 OFFICE O/P EST LOW 20 MIN: CPT | Performed by: FAMILY MEDICINE

## 2019-12-09 PROCEDURE — 1036F TOBACCO NON-USER: CPT | Performed by: FAMILY MEDICINE

## 2019-12-09 PROCEDURE — 96372 THER/PROPH/DIAG INJ SC/IM: CPT | Performed by: FAMILY MEDICINE

## 2019-12-09 PROCEDURE — G8482 FLU IMMUNIZE ORDER/ADMIN: HCPCS | Performed by: FAMILY MEDICINE

## 2019-12-09 PROCEDURE — 71046 X-RAY EXAM CHEST 2 VIEWS: CPT

## 2019-12-09 PROCEDURE — G8417 CALC BMI ABV UP PARAM F/U: HCPCS | Performed by: FAMILY MEDICINE

## 2019-12-09 PROCEDURE — G8427 DOCREV CUR MEDS BY ELIG CLIN: HCPCS | Performed by: FAMILY MEDICINE

## 2019-12-09 RX ORDER — SULFAMETHOXAZOLE AND TRIMETHOPRIM 800; 160 MG/1; MG/1
1 TABLET ORAL 2 TIMES DAILY
Qty: 20 TABLET | Refills: 0 | Status: SHIPPED | OUTPATIENT
Start: 2019-12-09 | End: 2019-12-19

## 2019-12-09 RX ORDER — METHYLPREDNISOLONE SODIUM SUCCINATE 125 MG/2ML
125 INJECTION, POWDER, LYOPHILIZED, FOR SOLUTION INTRAMUSCULAR; INTRAVENOUS ONCE
Status: COMPLETED | OUTPATIENT
Start: 2019-12-09 | End: 2019-12-09

## 2019-12-09 RX ORDER — DOXYCYCLINE HYCLATE 100 MG/1
100 CAPSULE ORAL 2 TIMES DAILY
Qty: 20 CAPSULE | Refills: 0 | Status: CANCELLED | OUTPATIENT
Start: 2019-12-09 | End: 2019-12-19

## 2019-12-09 RX ORDER — CEFTRIAXONE 1 G/1
1 INJECTION, POWDER, FOR SOLUTION INTRAMUSCULAR; INTRAVENOUS ONCE
Status: COMPLETED | OUTPATIENT
Start: 2019-12-09 | End: 2019-12-09

## 2019-12-09 RX ADMIN — CEFTRIAXONE 1 G: 1 INJECTION, POWDER, FOR SOLUTION INTRAMUSCULAR; INTRAVENOUS at 13:31

## 2019-12-09 RX ADMIN — METHYLPREDNISOLONE SODIUM SUCCINATE 125 MG: 125 INJECTION, POWDER, LYOPHILIZED, FOR SOLUTION INTRAMUSCULAR; INTRAVENOUS at 13:30

## 2019-12-09 ASSESSMENT — ENCOUNTER SYMPTOMS
APNEA: 0
NAUSEA: 0
SHORTNESS OF BREATH: 1
COUGH: 0
DIARRHEA: 0
CHEST TIGHTNESS: 1
WHEEZING: 1
ABDOMINAL PAIN: 0
VOMITING: 1
COLOR CHANGE: 0
TROUBLE SWALLOWING: 0
EYE PAIN: 0
DIFFICULTY BREATHING: 1
CONSTIPATION: 0
SORE THROAT: 0

## 2019-12-20 ENCOUNTER — OFFICE VISIT (OUTPATIENT)
Dept: FAMILY MEDICINE CLINIC | Age: 39
End: 2019-12-20
Payer: MEDICARE

## 2019-12-20 VITALS
OXYGEN SATURATION: 100 % | HEIGHT: 68 IN | HEART RATE: 98 BPM | WEIGHT: 293 LBS | SYSTOLIC BLOOD PRESSURE: 129 MMHG | DIASTOLIC BLOOD PRESSURE: 79 MMHG | BODY MASS INDEX: 44.41 KG/M2

## 2019-12-20 DIAGNOSIS — Z86.711 HISTORY OF PULMONARY EMBOLUS (PE): ICD-10-CM

## 2019-12-20 DIAGNOSIS — K90.89 OTHER SPECIFIED INTESTINAL MALABSORPTION: ICD-10-CM

## 2019-12-20 DIAGNOSIS — I10 ESSENTIAL HYPERTENSION: ICD-10-CM

## 2019-12-20 DIAGNOSIS — Z11.59 ENCOUNTER FOR SCREENING FOR OTHER VIRAL DISEASES: ICD-10-CM

## 2019-12-20 DIAGNOSIS — R60.9 PERIPHERAL EDEMA: ICD-10-CM

## 2019-12-20 DIAGNOSIS — E11.42 TYPE 2 DIABETES MELLITUS WITH DIABETIC POLYNEUROPATHY, WITH LONG-TERM CURRENT USE OF INSULIN (HCC): Primary | ICD-10-CM

## 2019-12-20 DIAGNOSIS — E53.8 VITAMIN B 12 DEFICIENCY: ICD-10-CM

## 2019-12-20 DIAGNOSIS — Z79.4 TYPE 2 DIABETES MELLITUS WITH DIABETIC POLYNEUROPATHY, WITH LONG-TERM CURRENT USE OF INSULIN (HCC): Primary | ICD-10-CM

## 2019-12-20 DIAGNOSIS — D68.61 ANTIPHOSPHOLIPID SYNDROME (HCC): ICD-10-CM

## 2019-12-20 DIAGNOSIS — I47.1 PAROXYSMAL SVT (SUPRAVENTRICULAR TACHYCARDIA) (HCC): ICD-10-CM

## 2019-12-20 DIAGNOSIS — Z98.84 HISTORY OF ROUX-EN-Y GASTRIC BYPASS: Chronic | ICD-10-CM

## 2019-12-20 DIAGNOSIS — Z23 NEED FOR VACCINATION AGAINST STREPTOCOCCUS PNEUMONIAE: ICD-10-CM

## 2019-12-20 DIAGNOSIS — Z86.718 HISTORY OF DVT OF LOWER EXTREMITY: ICD-10-CM

## 2019-12-20 DIAGNOSIS — E78.5 HYPERLIPIDEMIA WITH TARGET LDL LESS THAN 100: ICD-10-CM

## 2019-12-20 DIAGNOSIS — F31.70 BIPOLAR AFFECTIVE DISORDER IN REMISSION (HCC): ICD-10-CM

## 2019-12-20 DIAGNOSIS — R74.8 BLOOD ALKALINE PHOSPHATASE INCREASED COMPARED WITH PRIOR MEASUREMENT: ICD-10-CM

## 2019-12-20 DIAGNOSIS — Z98.890 HISTORY OF SURGERY OF LIVER: ICD-10-CM

## 2019-12-20 DIAGNOSIS — E55.9 VITAMIN D DEFICIENCY: ICD-10-CM

## 2019-12-20 LAB — HBA1C MFR BLD: 9.1 %

## 2019-12-20 PROCEDURE — 83036 HEMOGLOBIN GLYCOSYLATED A1C: CPT | Performed by: FAMILY MEDICINE

## 2019-12-20 PROCEDURE — 3046F HEMOGLOBIN A1C LEVEL >9.0%: CPT | Performed by: FAMILY MEDICINE

## 2019-12-20 PROCEDURE — 99999 PR OFFICE/OUTPT VISIT,PROCEDURE ONLY: CPT | Performed by: FAMILY MEDICINE

## 2019-12-20 PROCEDURE — G8417 CALC BMI ABV UP PARAM F/U: HCPCS | Performed by: FAMILY MEDICINE

## 2019-12-20 PROCEDURE — G0009 ADMIN PNEUMOCOCCAL VACCINE: HCPCS | Performed by: FAMILY MEDICINE

## 2019-12-20 PROCEDURE — 2022F DILAT RTA XM EVC RTNOPTHY: CPT | Performed by: FAMILY MEDICINE

## 2019-12-20 PROCEDURE — 1036F TOBACCO NON-USER: CPT | Performed by: FAMILY MEDICINE

## 2019-12-20 PROCEDURE — G8427 DOCREV CUR MEDS BY ELIG CLIN: HCPCS | Performed by: FAMILY MEDICINE

## 2019-12-20 PROCEDURE — 99215 OFFICE O/P EST HI 40 MIN: CPT | Performed by: FAMILY MEDICINE

## 2019-12-20 PROCEDURE — G8482 FLU IMMUNIZE ORDER/ADMIN: HCPCS | Performed by: FAMILY MEDICINE

## 2019-12-20 PROCEDURE — 90732 PPSV23 VACC 2 YRS+ SUBQ/IM: CPT | Performed by: FAMILY MEDICINE

## 2019-12-20 RX ORDER — GLUCAGON HYDROCHLORIDE 1 MG
KIT INJECTION
Status: ON HOLD | COMMUNITY
Start: 2019-12-19 | End: 2022-06-12 | Stop reason: HOSPADM

## 2019-12-20 RX ORDER — BUMETANIDE 0.5 MG/1
TABLET ORAL
Qty: 90 TABLET | Refills: 0 | Status: SHIPPED | OUTPATIENT
Start: 2019-12-20 | End: 2022-06-02

## 2019-12-20 RX ORDER — GLUCOSAMINE HCL/CHONDROITIN SU 500-400 MG
CAPSULE ORAL
Qty: 200 STRIP | Refills: 3 | Status: SHIPPED | OUTPATIENT
Start: 2019-12-20

## 2019-12-20 RX ORDER — POTASSIUM CHLORIDE 750 MG/1
TABLET, EXTENDED RELEASE ORAL
COMMUNITY
Start: 2019-12-12 | End: 2019-12-20 | Stop reason: SDUPTHER

## 2019-12-20 RX ORDER — POTASSIUM CHLORIDE 750 MG/1
20 TABLET, EXTENDED RELEASE ORAL DAILY PRN
Qty: 60 TABLET | Refills: 3 | Status: SHIPPED | OUTPATIENT
Start: 2019-12-20 | End: 2022-06-02

## 2019-12-20 RX ORDER — BUMETANIDE 0.5 MG/1
0.5 TABLET ORAL DAILY PRN
Qty: 30 TABLET | Refills: 3 | Status: SHIPPED | OUTPATIENT
Start: 2019-12-20 | End: 2019-12-20

## 2019-12-20 RX ORDER — CYANOCOBALAMIN 1000 UG/ML
1000 INJECTION INTRAMUSCULAR; SUBCUTANEOUS ONCE
Qty: 1 ML | Refills: 11 | Status: SHIPPED | OUTPATIENT
Start: 2019-12-20 | End: 2021-02-13 | Stop reason: SDUPTHER

## 2019-12-20 RX ORDER — BLOOD-GLUCOSE METER
KIT MISCELLANEOUS
Qty: 1 KIT | Refills: 0 | Status: SHIPPED | OUTPATIENT
Start: 2019-12-20

## 2019-12-20 RX ORDER — FONDAPARINUX SODIUM 10 MG/.8ML
10 INJECTION SUBCUTANEOUS DAILY
Qty: 24 ML | Refills: 11 | Status: SHIPPED | OUTPATIENT
Start: 2019-12-20 | End: 2021-01-08 | Stop reason: SDUPTHER

## 2019-12-20 RX ORDER — LORAZEPAM 2 MG/1
TABLET ORAL
Status: ON HOLD | COMMUNITY
Start: 2019-12-18 | End: 2022-03-19 | Stop reason: HOSPADM

## 2019-12-20 RX ORDER — RANITIDINE 150 MG/1
TABLET ORAL
COMMUNITY
Start: 2019-12-08 | End: 2019-12-20 | Stop reason: ALTCHOICE

## 2019-12-20 RX ORDER — CYANOCOBALAMIN 1000 UG/ML
1000 INJECTION INTRAMUSCULAR; SUBCUTANEOUS ONCE
Status: COMPLETED | OUTPATIENT
Start: 2019-12-20 | End: 2019-12-20

## 2019-12-20 RX ORDER — SYRINGE W-NEEDLE,DISPOSAB,3 ML 25GX5/8"
SYRINGE, EMPTY DISPOSABLE MISCELLANEOUS
Qty: 50 EACH | Refills: 3 | Status: SHIPPED | OUTPATIENT
Start: 2019-12-20 | End: 2021-02-13 | Stop reason: SDUPTHER

## 2019-12-20 RX ADMIN — CYANOCOBALAMIN 1000 MCG: 1000 INJECTION INTRAMUSCULAR; SUBCUTANEOUS at 11:57

## 2019-12-20 ASSESSMENT — ENCOUNTER SYMPTOMS
ABDOMINAL DISTENTION: 0
NAUSEA: 0
WHEEZING: 0
COUGH: 0
VOMITING: 0
DIARRHEA: 0
ABDOMINAL PAIN: 1
CHEST TIGHTNESS: 0
CONSTIPATION: 0

## 2019-12-20 ASSESSMENT — PATIENT HEALTH QUESTIONNAIRE - PHQ9
1. LITTLE INTEREST OR PLEASURE IN DOING THINGS: 1
SUM OF ALL RESPONSES TO PHQ QUESTIONS 1-9: 2
2. FEELING DOWN, DEPRESSED OR HOPELESS: 1
SUM OF ALL RESPONSES TO PHQ QUESTIONS 1-9: 2
SUM OF ALL RESPONSES TO PHQ9 QUESTIONS 1 & 2: 2

## 2019-12-21 PROBLEM — K90.9 MALABSORPTION: Status: ACTIVE | Noted: 2019-12-21

## 2019-12-21 PROBLEM — R10.13 EPIGASTRIC PAIN: Status: RESOLVED | Noted: 2019-02-23 | Resolved: 2019-12-21

## 2019-12-21 PROBLEM — R74.8 BLOOD ALKALINE PHOSPHATASE INCREASED COMPARED WITH PRIOR MEASUREMENT: Status: ACTIVE | Noted: 2019-12-21

## 2019-12-21 PROBLEM — N30.00 ACUTE CYSTITIS WITHOUT HEMATURIA: Status: RESOLVED | Noted: 2019-02-24 | Resolved: 2019-12-21

## 2019-12-21 PROBLEM — E53.8 VITAMIN B 12 DEFICIENCY: Status: ACTIVE | Noted: 2019-12-21

## 2019-12-21 PROBLEM — I47.10 PAROXYSMAL SVT (SUPRAVENTRICULAR TACHYCARDIA): Status: ACTIVE | Noted: 2019-12-21

## 2019-12-21 PROBLEM — F41.9 ANXIETY: Status: ACTIVE | Noted: 2018-02-14

## 2019-12-21 PROBLEM — E55.9 VITAMIN D DEFICIENCY: Status: ACTIVE | Noted: 2019-12-21

## 2019-12-21 PROBLEM — L03.90 CELLULITIS: Status: RESOLVED | Noted: 2018-11-17 | Resolved: 2019-12-21

## 2019-12-21 PROBLEM — K59.00 CONSTIPATION: Status: RESOLVED | Noted: 2019-09-03 | Resolved: 2019-12-21

## 2019-12-21 PROBLEM — I47.1 PAROXYSMAL SVT (SUPRAVENTRICULAR TACHYCARDIA) (HCC): Status: ACTIVE | Noted: 2019-12-21

## 2019-12-21 PROBLEM — E83.42 HYPOMAGNESEMIA: Status: ACTIVE | Noted: 2018-02-14

## 2019-12-21 PROBLEM — Z98.890 HISTORY OF SURGERY OF LIVER: Status: ACTIVE | Noted: 2019-12-21

## 2019-12-21 PROBLEM — Z86.711 HISTORY OF PULMONARY EMBOLUS (PE): Status: ACTIVE | Noted: 2019-12-21

## 2019-12-22 ASSESSMENT — ENCOUNTER SYMPTOMS
BLOOD IN STOOL: 0
SHORTNESS OF BREATH: 1

## 2020-01-07 LAB
ANTIBODY: <8
CHOLESTEROL, TOTAL: 154 MG/DL
CHOLESTEROL/HDL RATIO: 2.8
FOLATE: 14.3
HDLC SERPL-MCNC: 55 MG/DL (ref 35–70)
LDL CHOLESTEROL CALCULATED: 79 MG/DL (ref 0–160)
TRIGL SERPL-MCNC: 102 MG/DL
TSH SERPL DL<=0.05 MIU/L-ACNC: 1.87 UIU/ML
VITAMIN B-12: 236
VITAMIN D 25-HYDROXY: 30.4
VITAMIN D2, 25 HYDROXY: NORMAL
VITAMIN D3,25 HYDROXY: NORMAL
VLDLC SERPL CALC-MCNC: 20 MG/DL

## 2020-01-10 ENCOUNTER — NURSE ONLY (OUTPATIENT)
Dept: FAMILY MEDICINE CLINIC | Age: 40
End: 2020-01-10
Payer: MEDICARE

## 2020-01-10 PROCEDURE — 90746 HEPB VACCINE 3 DOSE ADULT IM: CPT | Performed by: FAMILY MEDICINE

## 2020-01-10 PROCEDURE — G0010 ADMIN HEPATITIS B VACCINE: HCPCS | Performed by: FAMILY MEDICINE

## 2020-01-13 ENCOUNTER — TELEPHONE (OUTPATIENT)
Dept: FAMILY MEDICINE CLINIC | Age: 40
End: 2020-01-13

## 2020-01-13 ENCOUNTER — NURSE ONLY (OUTPATIENT)
Dept: FAMILY MEDICINE CLINIC | Age: 40
End: 2020-01-13
Payer: MEDICARE

## 2020-01-13 PROCEDURE — 96372 THER/PROPH/DIAG INJ SC/IM: CPT | Performed by: FAMILY MEDICINE

## 2020-01-13 RX ORDER — CYANOCOBALAMIN 1000 UG/ML
1000 INJECTION INTRAMUSCULAR; SUBCUTANEOUS
Qty: 4 ML | Refills: 0 | Status: SHIPPED | OUTPATIENT
Start: 2020-01-13 | End: 2020-06-26

## 2020-01-13 RX ORDER — CYANOCOBALAMIN 1000 UG/ML
1000 INJECTION INTRAMUSCULAR; SUBCUTANEOUS ONCE
Status: COMPLETED | OUTPATIENT
Start: 2020-01-13 | End: 2020-01-13

## 2020-01-13 RX ADMIN — CYANOCOBALAMIN 1000 MCG: 1000 INJECTION INTRAMUSCULAR; SUBCUTANEOUS at 15:08

## 2020-01-13 NOTE — TELEPHONE ENCOUNTER
Please let the patient know to  prescription from pharmacy. Requested Prescriptions     Signed Prescriptions Disp Refills    cyanocobalamin 1000 MCG/ML injection 4 mL 0     Sig: Inject 1 mL into the muscle every 7 days     Authorizing Provider: Hernan Acosta [address] [place], [place] [address]cole Olguin[address] [phone]  [address] [place] [id number]  Phone: [phone] Fax: [phone]      Thank you!         FYI    Future Appointments   Date Time Provider Maria Luisa Walker   1/14/2020 10:45 AM SCHEDULE, [facility]   1/15/2020  2:30 PM SCHEDULE, [facility]   1/16/2020 10:45 AM SCHEDULE, [facility]   1/17/2020 10:00 AM SCHEDULE, [facility]   3/20/2020 11:30 AM Rhea James MD fp sc [facility]       Controlled Substances Monitoring:

## 2020-01-14 ENCOUNTER — NURSE ONLY (OUTPATIENT)
Dept: FAMILY MEDICINE CLINIC | Age: 40
End: 2020-01-14
Payer: MEDICARE

## 2020-01-14 PROCEDURE — 96372 THER/PROPH/DIAG INJ SC/IM: CPT | Performed by: FAMILY MEDICINE

## 2020-01-14 RX ORDER — CYANOCOBALAMIN 1000 UG/ML
1000 INJECTION INTRAMUSCULAR; SUBCUTANEOUS ONCE
Status: COMPLETED | OUTPATIENT
Start: 2020-01-14 | End: 2020-01-14

## 2020-01-14 RX ADMIN — CYANOCOBALAMIN 1000 MCG: 1000 INJECTION INTRAMUSCULAR; SUBCUTANEOUS at 16:02

## 2020-01-15 ENCOUNTER — NURSE ONLY (OUTPATIENT)
Dept: FAMILY MEDICINE CLINIC | Age: 40
End: 2020-01-15
Payer: MEDICARE

## 2020-01-15 ENCOUNTER — HOSPITAL ENCOUNTER (EMERGENCY)
Age: 40
Discharge: HOME | End: 2020-01-15
Attending: EMERGENCY MEDICINE
Payer: MEDICARE

## 2020-01-15 ENCOUNTER — APPOINTMENT (OUTPATIENT)
Dept: GENERAL RADIOLOGY | Age: 40
End: 2020-01-15
Payer: MEDICARE

## 2020-01-15 ENCOUNTER — APPOINTMENT (OUTPATIENT)
Dept: CT IMAGING | Age: 40
End: 2020-01-15
Payer: MEDICARE

## 2020-01-15 VITALS
HEIGHT: 68 IN | HEART RATE: 101 BPM | WEIGHT: 280 LBS | SYSTOLIC BLOOD PRESSURE: 152 MMHG | TEMPERATURE: 97.5 F | DIASTOLIC BLOOD PRESSURE: 94 MMHG | RESPIRATION RATE: 16 BRPM | OXYGEN SATURATION: 95 % | BODY MASS INDEX: 42.44 KG/M2

## 2020-01-15 LAB
ABSOLUTE EOS #: 0.19 K/UL (ref 0–0.44)
ABSOLUTE IMMATURE GRANULOCYTE: <0.03 K/UL (ref 0–0.3)
ABSOLUTE LYMPH #: 1.84 K/UL (ref 1.1–3.7)
ABSOLUTE MONO #: 0.43 K/UL (ref 0.1–1.2)
ANION GAP SERPL CALCULATED.3IONS-SCNC: 17 MMOL/L (ref 9–17)
BASOPHILS # BLD: 1 % (ref 0–2)
BASOPHILS ABSOLUTE: 0.05 K/UL (ref 0–0.2)
BUN BLDV-MCNC: 14 MG/DL (ref 6–20)
BUN/CREAT BLD: ABNORMAL (ref 9–20)
CALCIUM SERPL-MCNC: 9.5 MG/DL (ref 8.6–10.4)
CHLORIDE BLD-SCNC: 101 MMOL/L (ref 98–107)
CO2: 19 MMOL/L (ref 20–31)
CREAT SERPL-MCNC: 0.55 MG/DL (ref 0.5–0.9)
DIFFERENTIAL TYPE: NORMAL
EOSINOPHILS RELATIVE PERCENT: 3 % (ref 1–4)
GFR AFRICAN AMERICAN: >60 ML/MIN
GFR NON-AFRICAN AMERICAN: >60 ML/MIN
GFR SERPL CREATININE-BSD FRML MDRD: ABNORMAL ML/MIN/{1.73_M2}
GFR SERPL CREATININE-BSD FRML MDRD: ABNORMAL ML/MIN/{1.73_M2}
GLUCOSE BLD-MCNC: 215 MG/DL (ref 70–99)
HCT VFR BLD CALC: 42.4 % (ref 36.3–47.1)
HEMOGLOBIN: 13.5 G/DL (ref 11.9–15.1)
IMMATURE GRANULOCYTES: 0 %
INR BLD: 0.9
LYMPHOCYTES # BLD: 29 % (ref 24–43)
MCH RBC QN AUTO: 30 PG (ref 25.2–33.5)
MCHC RBC AUTO-ENTMCNC: 31.8 G/DL (ref 28.4–34.8)
MCV RBC AUTO: 94.2 FL (ref 82.6–102.9)
MONOCYTES # BLD: 7 % (ref 3–12)
NRBC AUTOMATED: 0 PER 100 WBC
PARTIAL THROMBOPLASTIN TIME: 27 SEC (ref 20.5–30.5)
PDW BLD-RTO: 13.6 % (ref 11.8–14.4)
PLATELET # BLD: 281 K/UL (ref 138–453)
PLATELET ESTIMATE: NORMAL
PMV BLD AUTO: 11.8 FL (ref 8.1–13.5)
POTASSIUM SERPL-SCNC: 3.9 MMOL/L (ref 3.7–5.3)
PROTHROMBIN TIME: 9.3 SEC (ref 9–12)
RBC # BLD: 4.5 M/UL (ref 3.95–5.11)
RBC # BLD: NORMAL 10*6/UL
SEG NEUTROPHILS: 60 % (ref 36–65)
SEGMENTED NEUTROPHILS ABSOLUTE COUNT: 3.9 K/UL (ref 1.5–8.1)
SODIUM BLD-SCNC: 137 MMOL/L (ref 135–144)
TROPONIN INTERP: NORMAL
TROPONIN T: NORMAL NG/ML
TROPONIN, HIGH SENSITIVITY: <6 NG/L (ref 0–14)
WBC # BLD: 6.4 K/UL (ref 3.5–11.3)
WBC # BLD: NORMAL 10*3/UL

## 2020-01-15 PROCEDURE — 85730 THROMBOPLASTIN TIME PARTIAL: CPT

## 2020-01-15 PROCEDURE — 93005 ELECTROCARDIOGRAM TRACING: CPT | Performed by: STUDENT IN AN ORGANIZED HEALTH CARE EDUCATION/TRAINING PROGRAM

## 2020-01-15 PROCEDURE — 85025 COMPLETE CBC W/AUTO DIFF WBC: CPT

## 2020-01-15 PROCEDURE — 6360000004 HC RX CONTRAST MEDICATION: Performed by: EMERGENCY MEDICINE

## 2020-01-15 PROCEDURE — 6360000002 HC RX W HCPCS: Performed by: EMERGENCY MEDICINE

## 2020-01-15 PROCEDURE — 96376 TX/PRO/DX INJ SAME DRUG ADON: CPT

## 2020-01-15 PROCEDURE — 96372 THER/PROPH/DIAG INJ SC/IM: CPT | Performed by: FAMILY MEDICINE

## 2020-01-15 PROCEDURE — 99284 EMERGENCY DEPT VISIT MOD MDM: CPT

## 2020-01-15 PROCEDURE — 80048 BASIC METABOLIC PNL TOTAL CA: CPT

## 2020-01-15 PROCEDURE — 96374 THER/PROPH/DIAG INJ IV PUSH: CPT

## 2020-01-15 PROCEDURE — 84484 ASSAY OF TROPONIN QUANT: CPT

## 2020-01-15 PROCEDURE — 85610 PROTHROMBIN TIME: CPT

## 2020-01-15 PROCEDURE — 71045 X-RAY EXAM CHEST 1 VIEW: CPT

## 2020-01-15 PROCEDURE — 2580000003 HC RX 258: Performed by: STUDENT IN AN ORGANIZED HEALTH CARE EDUCATION/TRAINING PROGRAM

## 2020-01-15 PROCEDURE — 96372 THER/PROPH/DIAG INJ SC/IM: CPT

## 2020-01-15 PROCEDURE — 93970 EXTREMITY STUDY: CPT

## 2020-01-15 PROCEDURE — 71260 CT THORAX DX C+: CPT

## 2020-01-15 RX ORDER — MORPHINE SULFATE 4 MG/ML
4 INJECTION, SOLUTION INTRAMUSCULAR; INTRAVENOUS ONCE
Status: COMPLETED | OUTPATIENT
Start: 2020-01-15 | End: 2020-01-15

## 2020-01-15 RX ORDER — 0.9 % SODIUM CHLORIDE 0.9 %
1000 INTRAVENOUS SOLUTION INTRAVENOUS ONCE
Status: COMPLETED | OUTPATIENT
Start: 2020-01-15 | End: 2020-01-15

## 2020-01-15 RX ORDER — CYANOCOBALAMIN 1000 UG/ML
1000 INJECTION INTRAMUSCULAR; SUBCUTANEOUS ONCE
Status: COMPLETED | OUTPATIENT
Start: 2020-01-15 | End: 2020-01-15

## 2020-01-15 RX ORDER — PROMETHAZINE HYDROCHLORIDE 25 MG/ML
25 INJECTION, SOLUTION INTRAMUSCULAR; INTRAVENOUS ONCE
Status: COMPLETED | OUTPATIENT
Start: 2020-01-15 | End: 2020-01-15

## 2020-01-15 RX ORDER — HYDROCODONE BITARTRATE AND ACETAMINOPHEN 5; 325 MG/1; MG/1
1 TABLET ORAL EVERY 6 HOURS PRN
Status: DISCONTINUED | OUTPATIENT
Start: 2020-01-15 | End: 2020-01-15 | Stop reason: HOSPADM

## 2020-01-15 RX ADMIN — MORPHINE SULFATE 4 MG: 4 INJECTION INTRAVENOUS at 20:57

## 2020-01-15 RX ADMIN — PROMETHAZINE HYDROCHLORIDE 25 MG: 25 INJECTION INTRAMUSCULAR; INTRAVENOUS at 20:56

## 2020-01-15 RX ADMIN — MORPHINE SULFATE 4 MG: 4 INJECTION INTRAVENOUS at 19:28

## 2020-01-15 RX ADMIN — CYANOCOBALAMIN 1000 MCG: 1000 INJECTION INTRAMUSCULAR; SUBCUTANEOUS at 14:33

## 2020-01-15 RX ADMIN — SODIUM CHLORIDE 1000 ML: 9 INJECTION, SOLUTION INTRAVENOUS at 18:39

## 2020-01-15 RX ADMIN — IOHEXOL 75 ML: 350 INJECTION, SOLUTION INTRAVENOUS at 19:59

## 2020-01-15 ASSESSMENT — PAIN DESCRIPTION - ORIENTATION: ORIENTATION: LEFT;LOWER

## 2020-01-15 ASSESSMENT — PAIN DESCRIPTION - DESCRIPTORS: DESCRIPTORS: DISCOMFORT

## 2020-01-15 ASSESSMENT — PAIN DESCRIPTION - PAIN TYPE: TYPE: ACUTE PAIN

## 2020-01-15 ASSESSMENT — PAIN SCALES - GENERAL
PAINLEVEL_OUTOF10: 8
PAINLEVEL_OUTOF10: 8
PAINLEVEL_OUTOF10: 4

## 2020-01-15 ASSESSMENT — PAIN DESCRIPTION - LOCATION: LOCATION: LEG

## 2020-01-15 NOTE — ED PROVIDER NOTES
[address] ED  Emergency Department  Emergency Medicine Resident Sign-out     Care of Nell Baker was assumed from Dr. Brisa Singh and is being seen for Leg Pain (left calf 8/10)  . The patient's initial evaluation and plan have been discussed with the prior provider who initially evaluated the patient. EMERGENCY DEPARTMENT COURSE / MEDICAL DECISION MAKING:       MEDICATIONS GIVEN:  Orders Placed This Encounter   Medications    0.9 % sodium chloride bolus    HYDROcodone-acetaminophen (NORCO) 5-325 MG per tablet 1 tablet    morphine injection 4 mg    iohexol (OMNIPAQUE 350) solution 75 mL    promethazine (PHENERGAN) injection 25 mg    morphine injection 4 mg       LABS / RADIOLOGY:     Labs Reviewed   BASIC METABOLIC PANEL - Abnormal; Notable for the following components:       Result Value    Glucose 215 (*)     CO2 19 (*)     All other components within normal limits   CBC WITH AUTO DIFFERENTIAL   PROTIME-INR   APTT   TROPONIN   TROPONIN       No results found. RECENT VITALS:     Temp: 97.5 °F (36.4 °C),  Pulse: 101, Resp: 16, BP: (!) 152/94, SpO2: 95 %      This patient is a 35 y.o. Female with leg pain, patient has antiphospholipid syndrome she is on Fuondaparix, patient has had a history of multiple DVTs as well as PE. Patient complaining of leg pain as well as shortness of breath. CT study pending as well as venous Doppler study pending. ED Course as of Luis 15 2052   Wed Luis 15, 2020   3047 Vascular ultrasound does not reveal any acute DVT. PE study pending. I have medicated the patient with morphine for pain control.    [KW]   2051 CT PE study as well as Doppler study revealed no acute findings I had discussion with patient regarding admission versus going home and following up with her hematologist at home. Patient was given the option and would like to go home. EKG reviewed there are T wave inversions in lead III this is unchanged from EKG in June 2019. Will discharge patient with instructions to follow-up with the appropriate specialist she currently follows with. [KW]      ED Course User Index  [KW] Alfonzo Lake DO       OUTSTANDING TASKS / RECOMMENDATIONS:    1. F/U CT PE     FINAL IMPRESSION:     1.  Left leg pain        DISPOSITION:         DISPOSITION:  [x]  Discharge   []  Transfer -    []  Admission -     []  Against Medical Advice   []  Eloped   FOLLOW-UP: Shelia Lamar MD  [address] [facility].  [address] [place] [address]  [phone]    Schedule an appointment as soon as possible for a visit   For Follow Up     DISCHARGE MEDICATIONS: New Prescriptions    No medications on file          Alfonzo Lake DO  Emergency Medicine Resident  [facility]       Alfonzo [place], [place]  01/15/20 2052

## 2020-01-15 NOTE — ED PROVIDER NOTES
[address] ED  Emergency Department Encounter  EmergencyMedicine Resident     Pt Read Fijian  MRN: [id number]  [place] 1980  Date of evaluation: 1/20/20  PCP:  Pravin Mclean MD    [address] [place] [address]       Chief Complaint   Patient presents with    Leg Pain     left calf 8/10       HISTORY OF PRESENT ILLNESS  (Location/Symptom, Timing/Onset, Context/Setting, Quality, Duration, Modifying Factors, Severity.)      Valerie Stratton is a 35 y.o. female who presents with left calf pain. Prior hx of DVT. Pt on fundaparaneux. Multiple PEs/DVTs in past with IVC filter. Mild SOB per pt with no cough, fevers. PAST MEDICAL / SURGICAL / SOCIAL / FAMILY HISTORY      has a past medical history of Alcohol abuse, Anxiety, Arthritis, Painting esophagus, Benzodiazepine overdose, Bipolar disorder, unspecified ([address] Bipolar I disorder, most recent episode depressed, severe without psychotic features ([address] Cellulitis, Chronic abdominal wound infection, Constipation, Depression, Drug overdose, intentional ([address] Genital herpes, unspecified, GERD (gastroesophageal reflux disease), Hx of blood clots, Hypertension, Iron deficiency anemia, Isopropyl alcohol poisoning, Lumbosacral spondylosis without myelopathy, MDRO (multiple drug resistant organisms) resistance, Miscarriage, Morbid obesity ([address] MRSA (methicillin resistant staph aureus) culture positive, Overdose of benzodiazepine, Pernicious anemia, Primary hypercoagulable state ([address] Pulmonary embolism ([address] Suicidal behavior, Suicidal ideation, Suicide attempt ([address] SVT (supraventricular tachycardia) ([address] Toxic effect of ethanol, intentional self-harm ([address] and Type 2 diabetes mellitus, with long-term current use of insulin ([address] has a past surgical history that includes Cholecystectomy; Liver Resection; hernia repair; Tonsillectomy; Endoscopy, colon, diagnostic; Abdominal hernia repair (2014);  Abdominal hernia repair (11/3/14); Bariatric Surgery (2013); Dilation and curettage of uterus (2005); Finger amputation (Left, 02/09/2015); lymph node biopsy (1990); yariel and bso (cervix removed) (2011); and IVC filter insertion. Social History     Socioeconomic History    Marital status:      Spouse name: Not on file    Number of children: 1    Years of education: 3 years of college    Highest education level: Not on file   Occupational History    Occupation: infant care     Comment: last worked 2008   Social Needs    Financial resource strain: Not on file    Food insecurity:     Worry: Not on file     Inability: Not on file   Private Practice needs:     Medical: Not on file     Non-medical: Not on file   Tobacco Use    Smoking status: Never Smoker    Smokeless tobacco: Never Used   Substance and Sexual Activity    Alcohol use: No     Alcohol/week: 0.0 standard drinks     Comment: Last alcohol use was in 12/2015    Drug use: No     Comment: Pt stole her mom's Benzo 1 yr ago. Pt said she took more than prescribed dose of Valium once in late 90's.     Sexual activity: Never   Lifestyle    Physical activity:     Days per week: Not on file     Minutes per session: Not on file    Stress: Not on file   Relationships    Social connections:     Talks on phone: Not on file     Gets together: Not on file     Attends Zoroastrianism service: Not on file     Active member of club or organization: Not on file     Attends meetings of clubs or organizations: Not on file     Relationship status: Not on file    Intimate partner violence:     Fear of current or ex partner: Not on file     Emotionally abused: Not on file     Physically abused: Not on file     Forced sexual activity: Not on file   Other Topics Concern    Not on file   Social History Narrative    Lives with Tanisha Elder ex  and daughter            Family History   Problem Relation Age of Onset    Depression Mother     High Blood Pressure Mother     High Cholesterol Mother    [facility] cyanocobalamin 1000 MCG/ML injection Inject 1 mL into the muscle once for 1 dose 12/20/19 12/20/19  Nevin Love MD   Syringe/Needle, Disp, (SYRINGE 3CC/25GX1\") 25G X 1\" 3 ML MISC To be used with B12 injections monthly 12/20/19   Nevin Love MD   bumetanide (BUMEX) 0.5 MG tablet TAKE 1 TABLET BY MOUTH DAILY AS NEEDED FOR LEG SWELLING 12/20/19   Nevin Love MD   valACYclovir (VALTREX) 500 MG tablet Take 500 mg by mouth 2 times daily    Historical Provider, MD   pravastatin (PRAVACHOL) 40 MG tablet Take 40 mg by mouth daily    Historical Provider, MD   benazepril (LOTENSIN) 20 MG tablet Take 20 mg by mouth daily    Historical Provider, MD   lamoTRIgine (LAMICTAL) 200 MG tablet Take 200 mg by mouth daily    Historical Provider, MD   amphetamine-dextroamphetamine (ADDERALL, 30MG,) 30 MG tablet Take 30 mg by mouth daily.     Historical Provider, MD   famotidine (PEPCID) 20 MG tablet Take 1 tablet by mouth 2 times daily 12/5/19 1/4/20  BERNARDA Morataya CNP   albuterol sulfate  (90 Base) MCG/ACT inhaler Inhale 2 puffs into the lungs 4 times daily as needed for Wheezing 12/5/19   BERNARDA Morataya CNP   dicyclomine (BENTYL) 10 MG capsule Take 1 capsule by mouth every 6 hours as needed (cramps) 11/3/19   Margarito Miller MD   ondansetron (ZOFRAN) 4 MG tablet Take 1 tablet by mouth every 8 hours as needed for Nausea or Vomiting  Patient taking differently: Take 8 mg by mouth every 8 hours as needed for Nausea or Vomiting  2/25/19   Tanisha Wagner MD   QUEtiapine (SEROQUEL) 100 MG tablet Take 50 mg by mouth nightly as needed     Historical Provider, MD   Multiple Vitamins-Minerals (THERAPEUTIC MULTIVITAMIN-MINERALS) tablet Take 1 tablet by mouth daily    Historical Provider, MD   buPROPion (WELLBUTRIN XL) 300 MG extended release tablet Take 300 mg by mouth every morning    Historical Provider, MD   acetaminophen (TYLENOL) 325 MG tablet Take 2 tablets by mouth every 8 hours as Estimate NOT REPORTED    Protime-INR   Result Value Ref Range    Protime 9.3 9.0 - 12.0 sec    INR 0.9    APTT   Result Value Ref Range    PTT 27.0 20.5 - 30.5 sec   Basic Metabolic Panel   Result Value Ref Range    Glucose 215 (H) 70 - 99 mg/dL    BUN 14 6 - 20 mg/dL    CREATININE 0.55 0.50 - 0.90 mg/dL    Bun/Cre Ratio NOT REPORTED 9 - 20    Calcium 9.5 8.6 - 10.4 mg/dL    Sodium 137 135 - 144 mmol/L    Potassium 3.9 3.7 - 5.3 mmol/L    Chloride 101 98 - 107 mmol/L    CO2 19 (L) 20 - 31 mmol/L    Anion Gap 17 9 - 17 mmol/L    GFR Non-African American >60 >60 mL/min    GFR African American >60 >60 mL/min    GFR Comment          GFR Staging NOT REPORTED    Troponin   Result Value Ref Range    Troponin, High Sensitivity <6 0 - 14 ng/L    Troponin T NOT REPORTED <0.03 ng/mL    Troponin Interp NOT REPORTED    EKG 12 Lead   Result Value Ref Range    Ventricular Rate 80 BPM    Atrial Rate 80 BPM    P-R Interval 164 ms    QRS Duration 96 ms    Q-T Interval 394 ms    QTc Calculation (Bazett) 454 ms    P Axis 59 degrees    R Axis 56 degrees    T Axis 5 degrees         RADIOLOGY:  CT CHEST PULMONARY EMBOLISM W CONTRAST   Final Result   1. No pulmonary emboli. 2. Mosaic perfusion in the lungs suggesting underlying small airway disease   (asthma, bronchiolitis, etc.). VL DUP LOWER EXTREMITY VENOUS BILATERAL   Final Result      XR CHEST PORTABLE   Final Result   No acute cardiopulmonary process. EMERGENCY DEPARTMENT COURSE:  ED Course as of Luis 20 0659   Wed Luis 15, 2020   1925 Vascular ultrasound does not reveal any acute DVT. PE study pending. I have medicated the patient with morphine for pain control.    [KW]   2051 CT PE study as well as Doppler study revealed no acute findings I had discussion with patient regarding admission versus going home and following up with her hematologist at home. Patient was given the option and would like to go home.   EKG reviewed there are T wave inversions in

## 2020-01-15 NOTE — ED PROVIDER NOTES
injections and she has been on this for years. She has had multiple DVT and pulmonary emboli including after having an IVC filter. She has lupus and antiphospholipid antibody syndrome. She has recurrence of her pleuritic chest pain, shortness of breath and severe left calf pain worse with dorsiflexion. On exam she is tachycardic afebrile hypertensive no respiratory distress. Lungs are clear. Left calf is tender and positive Homans but no cords. Concern is for VTE. Plan is venous Doppler and CTA. Freddie Dyson.  Kenrick Morales MD, [address]  Attending Emergency  Physician                Yosef Briggs MD  01/15/20 0689       Yosef Briggs MD  01/15/20 Arron Hernández

## 2020-01-16 ENCOUNTER — NURSE ONLY (OUTPATIENT)
Dept: FAMILY MEDICINE CLINIC | Age: 40
End: 2020-01-16
Payer: MEDICARE

## 2020-01-16 ENCOUNTER — TELEPHONE (OUTPATIENT)
Dept: FAMILY MEDICINE CLINIC | Age: 40
End: 2020-01-16

## 2020-01-16 LAB
EKG ATRIAL RATE: 80 BPM
EKG P AXIS: 59 DEGREES
EKG P-R INTERVAL: 164 MS
EKG Q-T INTERVAL: 394 MS
EKG QRS DURATION: 96 MS
EKG QTC CALCULATION (BAZETT): 454 MS
EKG R AXIS: 56 DEGREES
EKG T AXIS: 5 DEGREES
EKG VENTRICULAR RATE: 80 BPM

## 2020-01-16 PROCEDURE — 96372 THER/PROPH/DIAG INJ SC/IM: CPT | Performed by: FAMILY MEDICINE

## 2020-01-16 PROCEDURE — 93010 ELECTROCARDIOGRAM REPORT: CPT | Performed by: INTERNAL MEDICINE

## 2020-01-16 RX ORDER — CYANOCOBALAMIN 1000 UG/ML
1000 INJECTION INTRAMUSCULAR; SUBCUTANEOUS ONCE
Status: COMPLETED | OUTPATIENT
Start: 2020-01-16 | End: 2020-01-16

## 2020-01-16 RX ADMIN — CYANOCOBALAMIN 1000 MCG: 1000 INJECTION INTRAMUSCULAR; SUBCUTANEOUS at 11:20

## 2020-01-16 NOTE — ED PROVIDER NOTES
Care assumed from Dr. Brigette Ivy,    Patient with lower extremity pain. Pending lower extremity DVT ultrasound. CT PE negative. Will reassess after work-up.        Tk Hernandez MD  01/15/20 2055

## 2020-01-20 ASSESSMENT — ENCOUNTER SYMPTOMS
NAUSEA: 0
VOMITING: 0
SORE THROAT: 0
TROUBLE SWALLOWING: 0
BACK PAIN: 0
SHORTNESS OF BREATH: 0
PHOTOPHOBIA: 0
CHEST TIGHTNESS: 0
COUGH: 0
ABDOMINAL PAIN: 0
WHEEZING: 0

## 2020-01-22 NOTE — PLAN OF CARE
Problem: Skin Integrity:  Goal: Will show no infection signs and symptoms  Will show no infection signs and symptoms   Outcome: Ongoing    Goal: Absence of new skin breakdown  Absence of new skin breakdown   Outcome: Ongoing      Problem: Pain:  Goal: Pain level will decrease  Pain level will decrease   Outcome: Ongoing    Goal: Control of acute pain  Control of acute pain   Outcome: Ongoing    Goal: Control of chronic pain  Control of chronic pain   Outcome: Ongoing      Problem: Falls - Risk of:  Goal: Will remain free from falls  Will remain free from falls   Outcome: Ongoing    Goal: Absence of physical injury  Absence of physical injury   Outcome: Ongoing
No

## 2020-02-18 ENCOUNTER — HOSPITAL ENCOUNTER (EMERGENCY)
Age: 40
Discharge: HOME | End: 2020-02-19
Attending: EMERGENCY MEDICINE
Payer: MEDICARE

## 2020-02-18 VITALS
HEART RATE: 100 BPM | BODY MASS INDEX: 42.57 KG/M2 | RESPIRATION RATE: 16 BRPM | OXYGEN SATURATION: 100 % | SYSTOLIC BLOOD PRESSURE: 159 MMHG | DIASTOLIC BLOOD PRESSURE: 100 MMHG | WEIGHT: 280 LBS | TEMPERATURE: 97.4 F

## 2020-02-18 PROCEDURE — 12001 RPR S/N/AX/GEN/TRNK 2.5CM/<: CPT

## 2020-02-18 PROCEDURE — 99282 EMERGENCY DEPT VISIT SF MDM: CPT

## 2020-02-18 RX ORDER — NAPROXEN 250 MG/1
250 TABLET ORAL 2 TIMES DAILY WITH MEALS
Qty: 10 TABLET | Refills: 0 | Status: SHIPPED | OUTPATIENT
Start: 2020-02-18 | End: 2020-02-19 | Stop reason: SINTOL

## 2020-02-18 RX ORDER — HYDROCODONE BITARTRATE AND ACETAMINOPHEN 5; 325 MG/1; MG/1
1 TABLET ORAL ONCE
Status: COMPLETED | OUTPATIENT
Start: 2020-02-18 | End: 2020-02-19

## 2020-02-18 ASSESSMENT — PAIN DESCRIPTION - DESCRIPTORS: DESCRIPTORS: BURNING

## 2020-02-18 ASSESSMENT — PAIN DESCRIPTION - PAIN TYPE: TYPE: ACUTE PAIN

## 2020-02-18 ASSESSMENT — PAIN SCALES - GENERAL: PAINLEVEL_OUTOF10: 9

## 2020-02-18 ASSESSMENT — PAIN DESCRIPTION - ORIENTATION: ORIENTATION: LEFT

## 2020-02-19 ENCOUNTER — TELEPHONE (OUTPATIENT)
Dept: FAMILY MEDICINE CLINIC | Age: 40
End: 2020-02-19

## 2020-02-19 PROCEDURE — 6370000000 HC RX 637 (ALT 250 FOR IP): Performed by: EMERGENCY MEDICINE

## 2020-02-19 RX ORDER — ACETAMINOPHEN 500 MG
1000 TABLET ORAL EVERY 8 HOURS PRN
Qty: 20 TABLET | Refills: 0 | Status: SHIPPED | OUTPATIENT
Start: 2020-02-19

## 2020-02-19 RX ADMIN — HYDROCODONE BITARTRATE AND ACETAMINOPHEN 1 TABLET: 5; 325 TABLET ORAL at 00:10

## 2020-02-19 ASSESSMENT — PAIN SCALES - GENERAL: PAINLEVEL_OUTOF10: 8

## 2020-02-19 NOTE — TELEPHONE ENCOUNTER
[facility] ([facility]) ED Follow up Call    Reason for ED visit:  LACERATION      2/19/2020           FU appts/Provider:    Future Appointments   Date Time Provider Maria Luisa Walker   3/20/2020 11:30 AM MD willam Garcia sc [id number], this message is for Rick porras. This is Barry Carballo from [address] [place] [address] office. Just calling to see how you are doing after your recent visit to the Emergency Room. [address] [place] [address] wants to make sure you were able to fill any prescriptions and that you understand your discharge instructions. Please return our call if you need to make a follow up appointment with your provider or have any further needs. Our phone number is [phone]. Have a great day.

## 2020-02-19 NOTE — ED PROVIDER NOTES
EMERGENCY DEPARTMENT ENCOUNTER   ATTENDING ATTESTATION     Pt Name: Sathya Gardner  MRN: [id number]  [place] 1980  Date of evaluation: 2/18/20       Sathya Gardner is a 35 y.o. female who presents with Laceration      MDM:   79-year-old female who comes in today with laceration on the left fifth digit just distal to the PIP horizontal very superficial will place Dermabond tetanus up-to-date. Vitals:   Vitals:    02/18/20 2310   BP: (!) 159/100   Pulse: 100   Resp: 16   Temp: 97.4 °F (36.3 °C)   TempSrc: Oral   SpO2: 100%   Weight: 280 lb (127 kg)         I personally evaluated and examined the patient in conjunction with the resident and agree with the assessment, treatment plan, and disposition of the patient as recorded by the resident. I performed a history and physical examination of the patient and discussed management with the resident. I reviewed the residents note and agree with the documented findings and plan of care. Any areas of disagreement are noted on the chart. I was personally present for the key portions of any procedures. I have documented in the chart those procedures where I was not present during the key portions. I have personally reviewed all images and agree with the resident's interpretation. I have reviewed the emergency nurses triage note. I agree with the chief complaint, past medical history, past surgical history, allergies, medications, social and family history as documented unless otherwise noted.     Jin Gotti MD  Attending Emergency Physician            Jin Gotti MD  02/18/20 1253

## 2020-02-19 NOTE — ED PROVIDER NOTES
dispense according to patients insurance. 12/20/19   Favio Frederick MD   fondaparinux (ARIXTRA) 10 MG/0.8ML SOLN injection Inject 0.8 mLs into the skin daily 12/20/19   MD WALESKA Fisher-CON M10 10 MEQ extended release tablet Take 2 tablets by mouth daily as needed (take with bumex) 12/20/19   Favio Frederick MD   dapagliflozin (FARXIGA) 10 MG tablet Take 1 tablet by mouth every morning 12/20/19   Favio Frederick MD   cyanocobalamin 1000 MCG/ML injection Inject 1 mL into the muscle once for 1 dose 12/20/19 12/20/19  Favio Frederick MD   Syringe/Needle, Disp, (SYRINGE 3CC/25GX1\") 25G X 1\" 3 ML MISC To be used with B12 injections monthly 12/20/19   Favio Frederick MD   bumetanide (BUMEX) 0.5 MG tablet TAKE 1 TABLET BY MOUTH DAILY AS NEEDED FOR LEG SWELLING 12/20/19   Favio Frederick MD   valACYclovir (VALTREX) 500 MG tablet Take 500 mg by mouth 2 times daily    Historical Provider, MD   pravastatin (PRAVACHOL) 40 MG tablet Take 40 mg by mouth daily    Historical Provider, MD   benazepril (LOTENSIN) 20 MG tablet Take 20 mg by mouth daily    Historical Provider, MD   lamoTRIgine (LAMICTAL) 200 MG tablet Take 200 mg by mouth daily    Historical Provider, MD   amphetamine-dextroamphetamine (ADDERALL, 30MG,) 30 MG tablet Take 30 mg by mouth daily.     Historical Provider, MD   famotidine (PEPCID) 20 MG tablet Take 1 tablet by mouth 2 times daily 12/5/19 1/4/20  BERNARDA Morataya CNP   albuterol sulfate  (90 Base) MCG/ACT inhaler Inhale 2 puffs into the lungs 4 times daily as needed for Wheezing 12/5/19   BERNARDA Morataya CNP   dicyclomine (BENTYL) 10 MG capsule Take 1 capsule by mouth every 6 hours as needed (cramps) 11/3/19   Yoko Peraza MD   ondansetron (ZOFRAN) 4 MG tablet Take 1 tablet by mouth every 8 hours as needed for Nausea or Vomiting  Patient taking differently: Take 8 mg by mouth every 8 hours as needed for Nausea or Vomiting  2/25/19   Wendy Amezcua Mao Toney MD   QUEtiapine (SEROQUEL) 100 MG tablet Take 50 mg by mouth nightly as needed     Historical Provider, MD   Multiple Vitamins-Minerals (THERAPEUTIC MULTIVITAMIN-MINERALS) tablet Take 1 tablet by mouth daily    Historical Provider, MD   buPROPion (WELLBUTRIN XL) 300 MG extended release tablet Take 300 mg by mouth every morning    Historical Provider, MD   Cyanocobalamin (B-12) 1000 MCG/ML KIT Inject as directed    Historical Provider, MD   tiZANidine (ZANAFLEX) 4 MG tablet Take 1 tablet by mouth every 6 hours as needed (pain/spasm) 7/2/17   Zenobia Laboy DO   diltiazem (CARDIZEM CD) 240 MG extended release capsule Take 1 capsule by mouth daily 4/10/17   Rosalia Babinski, MD   vitamin D (CHOLECALCIFEROL) 1000 UNIT TABS tablet Take 10,000 Units by mouth daily 5 days a week    Historical Provider, MD       REVIEW OF SYSTEMS    (2-9 systems for level 4, 10 or more for level 5)    Review of Systems   Musculoskeletal: Positive for myalgias. Skin: Positive for wound. Neurological: Negative for weakness and numbness. Hematological: Does not bruise/bleed easily. PHYSICAL EXAM   (up to 7 for level 4, 8 or more for level 5)    VITALS:   Vitals:    02/18/20 2310   BP: (!) 159/100   Pulse: 100   Resp: 16   Temp: 97.4 °F (36.3 °C)   TempSrc: Oral   SpO2: 100%   Weight: 280 lb (127 kg)       Physical Exam  Vitals signs and nursing note reviewed. Constitutional:       General: She is not in acute distress. Appearance: She is well-developed. She is not diaphoretic. HENT:      Head: Normocephalic and atraumatic. Eyes:      Conjunctiva/sclera: Conjunctivae normal.   Neck:      Musculoskeletal: Normal range of motion. Cardiovascular:      Rate and Rhythm: Normal rate and regular rhythm. Pulses: Normal pulses. Pulmonary:      Effort: Pulmonary effort is normal. No respiratory distress. Musculoskeletal: Normal range of motion. General: Tenderness and signs of injury present.       Right DIP  -Length: 1 cm  -Layered closure: No    POSTOPERATIVE DIAGNOSIS:  Same  PROCEDURE PERFORMED:  Suture closure of laceration  PERFORMING PHYSICIAN: Ayana Camp DO  ANESTHESIA:  Local utilizing  not required  ESTIMATED BLOOD LOSS:  Less than 25 ml. DISCUSSION:  Real Alvarez is a 35y.o.-year-old female. Patient requires laceration repair. The history and physical examination were reviewed and confirmed. CONSENT: The patient provided verbal consent for this procedure. PROCEDURE:  Prior to starting, the procedure and patient were confirmed by those present. The wound area was cleansed with chlorhexidine gluconate and draped in a sterile fashion. The wound area was anesthetized with not required. The wound was explored with the following results No tendon laceration seen. The wound was repaired with Dermabond and steri strips. The wound was dressed with gauze. All sponge, instrument and needle counts were correct at the completion of the procedure. The patient tolerated the procedure well. SUTURE COUNT:  None    COMPLICATIONS:  None     Ayana Camp DO  12:15 AM, 2/18/20      CONSULTS:  None    CRITICAL CARE:  Please see attending note    FINAL IMPRESSION     1. Laceration of left little finger without foreign body without damage to nail, initial encounter         DISPOSITION / PLAN   DISPOSITION Decision To Discharge 02/18/2020 11:35:48 PM      Evaluation and treatment course in the ED, and plan of care upon discharge was discussed in length with the patient. Patient had no further questions prior to being discharged and was instructed to return to the ED for new or worsening symptoms. Any changes to existing medications or new prescriptions were reviewed with patient and they expressed understanding of how to correctly take their medications and the possible side effects.     PATIENT REFERRED TO:  Yashira Vu MD  [address] [place] [address].  [address] [place] [address]

## 2020-02-20 ENCOUNTER — TELEPHONE (OUTPATIENT)
Dept: FAMILY MEDICINE CLINIC | Age: 40
End: 2020-02-20

## 2020-02-20 NOTE — TELEPHONE ENCOUNTER
We are absolutely not allowed to prescribe any opiates with lorazepam due to high risk of cardiorespiratory depression, so we absolutely cannot give any opiates.   However she could take acetaminophen 500 every 6 hours as needed for pain, can use lidocaine cream topically on the skin, ice, elevation, triple antibiotic cream from over-the-counter      She also should make an appointment either with orthopedics or with us to follow-up on the laceration      Future Appointments   Date Time Provider Maria Luisa Denise   3/20/2020 11:30 AM Glory Elam MD [facility] [address] [facility] Date ID   Written Drug Qty Days Prescriber Rx # Pharmacy Refill   Daily Dose* Pymt Type      02/17/2020  1   01/24/2020  Lorazepam 2 MG Tablet  90.00 [address]   [id number]   [facility] ([id number])   0  6.00 LME  [facility]   01/31/2020  1   01/24/2020  Dextroamp-Amphetamin 30 MG Tab  30.00 [address]   [id number]   Wal ([id number])   0   [facility]   01/18/2020  1   11/22/2019  Dextroamp-Amphet Er 30 MG Cap  30.00 [address]   [id number]   Wal ([id number])   0   [facility]   01/17/2020  1   11/22/2019  Lorazepam 2 MG Tablet  90.00 [address]   [id number]   Wal ([id number])   1  6.00 LME

## 2020-02-20 NOTE — TELEPHONE ENCOUNTER
Patient cut her pinky finger on L hand last night and cut it through the bone and was given 1 Norco last night while they stitched it up over at [facility] and was told to call PCP for more pain medication. Please advise.      RX: [facility] on [place]

## 2020-03-04 RX ORDER — FAMOTIDINE 20 MG/1
20 TABLET, FILM COATED ORAL 2 TIMES DAILY
Qty: 60 TABLET | Refills: 11 | Status: SHIPPED | OUTPATIENT
Start: 2020-03-04 | End: 2021-03-30

## 2020-03-04 NOTE — TELEPHONE ENCOUNTER
Please Approve or Refuse.   Send to Pharmacy per Pt's Request:      Next Visit Date:  3/20/2020   Last Visit Date: 12/20/2019    Hemoglobin A1C (%)   Date Value   12/20/2019 9.1   11/20/2018 9.6 (H)   01/30/2018 10.2             ( goal A1C is < 7)   BP Readings from Last 3 Encounters:   02/18/20 (!) 159/100   01/15/20 (!) 152/94   12/20/19 129/79          (goal 120/80)  BUN   Date Value Ref Range Status   01/15/2020 14 6 - 20 mg/dL Final     CREATININE   Date Value Ref Range Status   01/15/2020 0.55 0.50 - 0.90 mg/dL Final     Potassium   Date Value Ref Range Status   01/15/2020 3.9 3.7 - 5.3 mmol/L Final

## 2020-04-15 ENCOUNTER — TELEPHONE (OUTPATIENT)
Dept: FAMILY MEDICINE CLINIC | Age: 40
End: 2020-04-15

## 2020-04-26 ENCOUNTER — APPOINTMENT (OUTPATIENT)
Dept: GENERAL RADIOLOGY | Age: 40
End: 2020-04-26
Payer: MEDICARE

## 2020-04-26 ENCOUNTER — HOSPITAL ENCOUNTER (EMERGENCY)
Age: 40
Discharge: HOME | End: 2020-04-27
Attending: EMERGENCY MEDICINE
Payer: MEDICARE

## 2020-04-26 VITALS
TEMPERATURE: 98.3 F | WEIGHT: 293 LBS | SYSTOLIC BLOOD PRESSURE: 156 MMHG | DIASTOLIC BLOOD PRESSURE: 89 MMHG | BODY MASS INDEX: 44.41 KG/M2 | HEIGHT: 68 IN | HEART RATE: 92 BPM | OXYGEN SATURATION: 100 % | RESPIRATION RATE: 17 BRPM

## 2020-04-26 LAB
ABSOLUTE EOS #: 0.2 K/UL (ref 0–0.4)
ABSOLUTE IMMATURE GRANULOCYTE: ABNORMAL K/UL (ref 0–0.3)
ABSOLUTE LYMPH #: 2.3 K/UL (ref 1–4.8)
ABSOLUTE MONO #: 0.6 K/UL (ref 0.1–1.3)
BASOPHILS # BLD: 0 % (ref 0–2)
BASOPHILS ABSOLUTE: 0 K/UL (ref 0–0.2)
DIFFERENTIAL TYPE: ABNORMAL
EOSINOPHILS RELATIVE PERCENT: 3 % (ref 0–4)
HCT VFR BLD CALC: 39.7 % (ref 36–46)
HEMOGLOBIN: 12.9 G/DL (ref 12–16)
IMMATURE GRANULOCYTES: ABNORMAL %
LYMPHOCYTES # BLD: 33 % (ref 24–44)
MCH RBC QN AUTO: 28.3 PG (ref 26–34)
MCHC RBC AUTO-ENTMCNC: 32.4 G/DL (ref 31–37)
MCV RBC AUTO: 87.3 FL (ref 80–100)
MONOCYTES # BLD: 8 % (ref 1–7)
NRBC AUTOMATED: ABNORMAL PER 100 WBC
PDW BLD-RTO: 13.8 % (ref 11.5–14.9)
PLATELET # BLD: 263 K/UL (ref 150–450)
PLATELET ESTIMATE: ABNORMAL
PMV BLD AUTO: 10.1 FL (ref 6–12)
RBC # BLD: 4.55 M/UL (ref 4–5.2)
RBC # BLD: ABNORMAL 10*6/UL
SEG NEUTROPHILS: 56 % (ref 36–66)
SEGMENTED NEUTROPHILS ABSOLUTE COUNT: 4 K/UL (ref 1.3–9.1)
WBC # BLD: 7.2 K/UL (ref 3.5–11)
WBC # BLD: ABNORMAL 10*3/UL

## 2020-04-26 PROCEDURE — 83615 LACTATE (LD) (LDH) ENZYME: CPT

## 2020-04-26 PROCEDURE — 36415 COLL VENOUS BLD VENIPUNCTURE: CPT

## 2020-04-26 PROCEDURE — 84484 ASSAY OF TROPONIN QUANT: CPT

## 2020-04-26 PROCEDURE — 83605 ASSAY OF LACTIC ACID: CPT

## 2020-04-26 PROCEDURE — 83690 ASSAY OF LIPASE: CPT

## 2020-04-26 PROCEDURE — 71045 X-RAY EXAM CHEST 1 VIEW: CPT

## 2020-04-26 PROCEDURE — 85025 COMPLETE CBC W/AUTO DIFF WBC: CPT

## 2020-04-26 PROCEDURE — 2580000003 HC RX 258: Performed by: PHYSICIAN ASSISTANT

## 2020-04-26 PROCEDURE — 96375 TX/PRO/DX INJ NEW DRUG ADDON: CPT

## 2020-04-26 PROCEDURE — 99284 EMERGENCY DEPT VISIT MOD MDM: CPT

## 2020-04-26 PROCEDURE — 80053 COMPREHEN METABOLIC PANEL: CPT

## 2020-04-26 PROCEDURE — 85610 PROTHROMBIN TIME: CPT

## 2020-04-26 PROCEDURE — 6360000002 HC RX W HCPCS: Performed by: PHYSICIAN ASSISTANT

## 2020-04-26 PROCEDURE — 96374 THER/PROPH/DIAG INJ IV PUSH: CPT

## 2020-04-26 PROCEDURE — 6360000002 HC RX W HCPCS: Performed by: EMERGENCY MEDICINE

## 2020-04-26 RX ORDER — 0.9 % SODIUM CHLORIDE 0.9 %
1000 INTRAVENOUS SOLUTION INTRAVENOUS ONCE
Status: COMPLETED | OUTPATIENT
Start: 2020-04-26 | End: 2020-04-27

## 2020-04-26 RX ORDER — KETOROLAC TROMETHAMINE 30 MG/ML
30 INJECTION, SOLUTION INTRAMUSCULAR; INTRAVENOUS ONCE
Status: COMPLETED | OUTPATIENT
Start: 2020-04-26 | End: 2020-04-26

## 2020-04-26 RX ORDER — ONDANSETRON 2 MG/ML
4 INJECTION INTRAMUSCULAR; INTRAVENOUS ONCE
Status: COMPLETED | OUTPATIENT
Start: 2020-04-26 | End: 2020-04-26

## 2020-04-26 RX ADMIN — KETOROLAC TROMETHAMINE 30 MG: 30 INJECTION, SOLUTION INTRAMUSCULAR; INTRAVENOUS at 23:30

## 2020-04-26 RX ADMIN — ONDANSETRON 4 MG: 2 INJECTION INTRAMUSCULAR; INTRAVENOUS at 22:12

## 2020-04-26 RX ADMIN — SODIUM CHLORIDE 1000 ML: 9 INJECTION, SOLUTION INTRAVENOUS at 22:12

## 2020-04-26 ASSESSMENT — ENCOUNTER SYMPTOMS
ABDOMINAL PAIN: 1
HEMATOCHEZIA: 0
NAUSEA: 1
HEMATEMESIS: 0
VOMITING: 1
CONSTIPATION: 1

## 2020-04-26 ASSESSMENT — PAIN SCALES - GENERAL
PAINLEVEL_OUTOF10: 7
PAINLEVEL_OUTOF10: 7

## 2020-04-26 ASSESSMENT — PAIN DESCRIPTION - PAIN TYPE: TYPE: ACUTE PAIN

## 2020-04-26 ASSESSMENT — PAIN DESCRIPTION - LOCATION: LOCATION: ABDOMEN

## 2020-04-27 ENCOUNTER — APPOINTMENT (OUTPATIENT)
Dept: CT IMAGING | Age: 40
End: 2020-04-27
Payer: MEDICARE

## 2020-04-27 LAB
ALBUMIN SERPL-MCNC: 3.3 G/DL (ref 3.5–5.2)
ALBUMIN/GLOBULIN RATIO: ABNORMAL (ref 1–2.5)
ALP BLD-CCNC: 135 U/L (ref 35–104)
ALT SERPL-CCNC: 9 U/L (ref 5–33)
ANION GAP SERPL CALCULATED.3IONS-SCNC: 13 MMOL/L (ref 9–17)
AST SERPL-CCNC: 10 U/L
BILIRUB SERPL-MCNC: <0.15 MG/DL (ref 0.3–1.2)
BUN BLDV-MCNC: 12 MG/DL (ref 6–20)
BUN/CREAT BLD: ABNORMAL (ref 9–20)
CALCIUM SERPL-MCNC: 8.7 MG/DL (ref 8.6–10.4)
CHLORIDE BLD-SCNC: 108 MMOL/L (ref 98–107)
CO2: 21 MMOL/L (ref 20–31)
CREAT SERPL-MCNC: 0.52 MG/DL (ref 0.5–0.9)
GFR AFRICAN AMERICAN: >60 ML/MIN
GFR NON-AFRICAN AMERICAN: >60 ML/MIN
GFR SERPL CREATININE-BSD FRML MDRD: ABNORMAL ML/MIN/{1.73_M2}
GFR SERPL CREATININE-BSD FRML MDRD: ABNORMAL ML/MIN/{1.73_M2}
GLUCOSE BLD-MCNC: 107 MG/DL (ref 70–99)
INR BLD: 0.9
LACTATE DEHYDROGENASE: 162 U/L (ref 135–214)
LACTIC ACID: 1 MMOL/L (ref 0.5–2.2)
LIPASE: 16 U/L (ref 13–60)
POTASSIUM SERPL-SCNC: 4.3 MMOL/L (ref 3.7–5.3)
PROTHROMBIN TIME: 12.1 SEC (ref 11.8–14.6)
SODIUM BLD-SCNC: 142 MMOL/L (ref 135–144)
TOTAL PROTEIN: 6.4 G/DL (ref 6.4–8.3)
TROPONIN INTERP: NORMAL
TROPONIN T: NORMAL NG/ML
TROPONIN, HIGH SENSITIVITY: <6 NG/L (ref 0–14)

## 2020-04-27 PROCEDURE — 74177 CT ABD & PELVIS W/CONTRAST: CPT

## 2020-04-27 PROCEDURE — 2580000003 HC RX 258: Performed by: EMERGENCY MEDICINE

## 2020-04-27 PROCEDURE — 6360000004 HC RX CONTRAST MEDICATION: Performed by: EMERGENCY MEDICINE

## 2020-04-27 RX ORDER — SODIUM CHLORIDE 0.9 % (FLUSH) 0.9 %
10 SYRINGE (ML) INJECTION PRN
Status: DISCONTINUED | OUTPATIENT
Start: 2020-04-27 | End: 2020-04-27 | Stop reason: HOSPADM

## 2020-04-27 RX ORDER — ONDANSETRON 4 MG/1
4 TABLET, FILM COATED ORAL 3 TIMES DAILY PRN
Qty: 15 TABLET | Refills: 0 | Status: SHIPPED | OUTPATIENT
Start: 2020-04-27 | End: 2022-06-02

## 2020-04-27 RX ORDER — 0.9 % SODIUM CHLORIDE 0.9 %
80 INTRAVENOUS SOLUTION INTRAVENOUS ONCE
Status: COMPLETED | OUTPATIENT
Start: 2020-04-27 | End: 2020-04-27

## 2020-04-27 RX ADMIN — SODIUM CHLORIDE 80 ML: 9 INJECTION, SOLUTION INTRAVENOUS at 00:51

## 2020-04-27 RX ADMIN — Medication 10 ML: at 00:51

## 2020-04-27 RX ADMIN — IOVERSOL 75 ML: 741 INJECTION INTRA-ARTERIAL; INTRAVENOUS at 00:51

## 2020-04-27 NOTE — ED PROVIDER NOTES
Appearance: She is well-developed. HENT:      Head: Normocephalic and atraumatic. Cardiovascular:      Rate and Rhythm: Normal rate and regular rhythm. Heart sounds: Normal heart sounds. Pulmonary:      Effort: Pulmonary effort is normal.      Breath sounds: Normal breath sounds. Abdominal:      General: There is distension. Tenderness: There is abdominal tenderness. There is no guarding or rebound. Comments: Hypoactive bowel sounds are noted. She has a numerous old surgical scars. She is complaining of some abdominal distention. Skin:     Capillary Refill: Capillary refill takes less than 2 seconds. Neurological:      Mental Status: She is alert and oriented to person, place, and time. MEDICAL DECISION MAKING:     She is afebrile she is not tachycardic she is in no distress at upon arrival.  Numerous old surgical scars. Possibility for obstruction versus ileus versus perforation. Will order laboratory tests, CT abdomen pelvis with IV contrast.  Should get IV fluids n.p.o. and antiemetics upon arrival.  Will reevaluate. This time we will sign the patient out to the attending physician Dr. Keanu Fox. He will disposition the patient at this time no lab results are back and  CT of the abdomen pelvis has not been been resulted. DIAGNOSTIC RESULTS     EKG: All EKG's are interpreted by the Emergency Department Physician who either signs or Co-signs this chart in the absence of acardiologist.      RADIOLOGY:[place] film, CT, MRI, and formal ultrasound images (except ED bedside ultrasound) are read by the radiologist and the images and interpretations are directly viewed by the emergency physician. LABS:All lab results were reviewed by myself, and all abnormals are listed below.   Labs Reviewed   CBC WITH AUTO DIFFERENTIAL   COMPREHENSIVE METABOLIC PANEL W/ REFLEX TO MG FOR LOW K   LIPASE   PROTIME-INR   TROPONIN   LACTIC ACID   LACTATE DEHYDROGENASE         EMERGENCY

## 2020-04-28 ENCOUNTER — TELEPHONE (OUTPATIENT)
Dept: FAMILY MEDICINE CLINIC | Age: 40
End: 2020-04-28

## 2020-04-28 ENCOUNTER — CARE COORDINATION (OUTPATIENT)
Dept: CARE COORDINATION | Age: 40
End: 2020-04-28

## 2020-04-29 ENCOUNTER — CARE COORDINATION (OUTPATIENT)
Dept: CARE COORDINATION | Age: 40
End: 2020-04-29

## 2020-04-29 NOTE — CARE COORDINATION
Patient contacted regarding Tona Merino. Care Transition Nurse/ Ambulatory Care Manager contacted the patient by telephone to perform post discharge assessment. Verified name and  with patient as identifiers. Provided introduction to self, and explanation of the CTN/ACM role, and reason for call due to risk factors for infection and/or exposure to COVID-19. Symptoms reviewed with patient who verbalized the following symptoms: fatigue and nausea. Due to no new or worsening symptoms encounter was not routed to provider for escalation. Patient has following risk factors of: heart failure, COPD and immunocompromised. CTN/ACM reviewed discharge instructions, medical action plan and red flags such as increased shortness of breath, increasing fever and signs of decompensation with patient who verbalized understanding. Discussed exposure protocols and quarantine with CDC Guidelines What to do if you are sick with coronavirus disease .  Patient was given an opportunity for questions and concerns. The patient agrees to contact the [facility] exposure line [phone], local [facility] department [place] Department of Health: ([phone]) and PCP office for questions related to their healthcare. CTN/ACM provided contact information for future needs. Reviewed and educated patient on any new and changed medications related to discharge diagnosis     Patient/family/caregiver given information for GetWell Loop and agrees to enroll yes  Patient's preferred e-mail: [email or web address]  Patient's preferred phone number: [phone]  Based on Loop alert triggers, patient will be contacted by nurse care manager for worsening symptoms. Pt will be further monitored by COVID Loop Team based on severity of symptoms and risk factors.

## 2020-05-01 ENCOUNTER — TELEMEDICINE (OUTPATIENT)
Dept: FAMILY MEDICINE CLINIC | Age: 40
End: 2020-05-01
Payer: MEDICARE

## 2020-05-01 VITALS
HEART RATE: 74 BPM | SYSTOLIC BLOOD PRESSURE: 119 MMHG | BODY MASS INDEX: 43.04 KG/M2 | WEIGHT: 284 LBS | HEIGHT: 68 IN | DIASTOLIC BLOOD PRESSURE: 74 MMHG

## 2020-05-01 PROBLEM — K59.04 CHRONIC IDIOPATHIC CONSTIPATION: Status: ACTIVE | Noted: 2020-05-01

## 2020-05-01 PROBLEM — R10.10 PAIN OF UPPER ABDOMEN: Status: ACTIVE | Noted: 2019-02-23

## 2020-05-01 PROCEDURE — G8427 DOCREV CUR MEDS BY ELIG CLIN: HCPCS | Performed by: FAMILY MEDICINE

## 2020-05-01 PROCEDURE — 99214 OFFICE O/P EST MOD 30 MIN: CPT | Performed by: FAMILY MEDICINE

## 2020-05-01 RX ORDER — SUCRALFATE 1 G/1
1 TABLET ORAL 4 TIMES DAILY PRN
Qty: 120 TABLET | Refills: 3 | Status: SHIPPED | OUTPATIENT
Start: 2020-05-01 | End: 2020-05-01

## 2020-05-01 RX ORDER — BENAZEPRIL HYDROCHLORIDE 20 MG/1
20 TABLET ORAL DAILY
Qty: 90 TABLET | Refills: 3 | Status: SHIPPED | OUTPATIENT
Start: 2020-05-01 | End: 2021-03-30

## 2020-05-01 RX ORDER — SUCRALFATE 1 G/1
TABLET ORAL
Qty: 360 TABLET | Refills: 0 | Status: SHIPPED | OUTPATIENT
Start: 2020-05-01 | End: 2020-07-27

## 2020-05-01 RX ORDER — ONDANSETRON 4 MG/1
4 TABLET, ORALLY DISINTEGRATING ORAL 3 TIMES DAILY PRN
Qty: 21 TABLET | Refills: 0 | Status: SHIPPED | OUTPATIENT
Start: 2020-05-01 | End: 2020-06-23

## 2020-05-01 RX ORDER — VALACYCLOVIR HYDROCHLORIDE 500 MG/1
500 TABLET, FILM COATED ORAL DAILY
Qty: 90 TABLET | Refills: 3 | Status: SHIPPED | OUTPATIENT
Start: 2020-05-01 | End: 2020-06-08 | Stop reason: SDUPTHER

## 2020-05-01 ASSESSMENT — ENCOUNTER SYMPTOMS
VOMITING: 1
DIARRHEA: 1
WHEEZING: 0
ABDOMINAL PAIN: 1
COUGH: 0
NAUSEA: 0
CONSTIPATION: 1
CHEST TIGHTNESS: 0
SHORTNESS OF BREATH: 0
ABDOMINAL DISTENTION: 0

## 2020-05-01 NOTE — PATIENT INSTRUCTIONS
Patient Education        Abdominal Pain: Care Instructions  Your Care Instructions    Abdominal pain has many possible causes. Some aren't serious and get better on their own in a few days. Others need more testing and treatment. If your pain continues or gets worse, you need to be rechecked and may need more tests to find out what is wrong. You may need surgery to correct the problem. Don't ignore new symptoms, such as fever, nausea and vomiting, urination problems, pain that gets worse, and dizziness. These may be signs of a more serious problem. Your doctor may have recommended a follow-up visit in the next 8 to 12 hours. If you are not getting better, you may need more tests or treatment. The doctor has checked you carefully, but problems can develop later. If you notice any problems or new symptoms, get medical treatment right away. Follow-up care is a key part of your treatment and safety. Be sure to make and go to all appointments, and call your doctor if you are having problems. It's also a good idea to know your test results and keep a list of the medicines you take. How can you care for yourself at home? · Rest until you feel better. · To prevent dehydration, drink plenty of fluids, enough so that your urine is light yellow or clear like water. Choose water and other caffeine-free clear liquids until you feel better. If you have kidney, heart, or liver disease and have to limit fluids, talk with your doctor before you increase the amount of fluids you drink. · If your stomach is upset, eat mild foods, such as rice, dry toast or crackers, bananas, and applesauce. Try eating several small meals instead of two or three large ones. · Wait until 48 hours after all symptoms have gone away before you have spicy foods, alcohol, and drinks that contain caffeine. · Do not eat foods that are high in fat. · Avoid anti-inflammatory medicines such as aspirin, ibuprofen (Advil, Motrin), and naproxen (Aleve). These can cause stomach upset. Talk to your doctor if you take daily aspirin for another health problem. When should you call for help? Call 911 anytime you think you may need emergency care. For example, call if:    · You passed out (lost consciousness).     · You pass maroon or very bloody stools.     · You vomit blood or what looks like coffee grounds.     · You have new, severe belly pain.    Call your doctor now or seek immediate medical care if:    · Your pain gets worse, especially if it becomes focused in one area of your belly.     · You have a new or higher fever.     · Your stools are black and look like tar, or they have streaks of blood.     · You have unexpected vaginal bleeding.     · You have symptoms of a urinary tract infection. These may include:  ? Pain when you urinate. ? Urinating more often than usual.  ? Blood in your urine.     · You are dizzy or lightheaded, or you feel like you may faint.    Watch closely for changes in your health, and be sure to contact your doctor if:    · You are not getting better after 1 day (24 hours). Where can you learn more? Go to https://[email or web address].TellmeGen. org and sign in to your [employer] account. Enter P208 in the Rpptrip.com box to learn more about \"Abdominal Pain: Care Instructions. \"     If you do not have an account, please click on the \"Sign Up Now\" link. Current as of: June 26, 2019Content Version: 12.4  © 8039-1910 Healthwise, Incorporated. Care instructions adapted under license by [facility] ([facility]). If you have questions about a medical condition or this instruction, always ask your healthcare professional. [place] [address] any warranty or liability for your use of this information.

## 2020-05-01 NOTE — PROGRESS NOTES
Shelia Phillip at [facility]. I advised the patient to make appointment to GI      Hypertension and paroxysmal supraventricular tachycardia:    she  is not exercising and is adherent to low salt diet. Blood pressure is well controlled at home. Cardiac symptoms fatigue. Patient denies chest pain, chest pressure/discomfort, claudication, dyspnea, exertional chest pressure/discomfort, irregular heart beat, lower extremity edema, near-syncope, orthopnea, palpitations, paroxysmal nocturnal dyspnea, syncope and tachypnea. Cardiovascular risk factors: diabetes mellitus, dyslipidemia, hypertension and obesity (BMI >= 30 kg/m2)Use of agents associated with hypertension: amphetamines. Patient has history of paroxysmal SVT and she used to see cardiologist, Dr. Elis Monsivais  She denies palpitations or lightheadedness  She is on chronic anticoagulation for paroxysmal SVT but also for antiphospholipid syndrome, with multiple PEs and DVTs. Patient also has IVC filter. Patient developed blood clots while on rivaroxaban, Coumadin, and apparently only fondaparinux worked and helped her not to have clots anymore. This was recommended by her prior hematologist oncologist, Dr. Nixon Crandall. She did not have any more thromboembolic events since on fondaparinux. She denies easy bruising      blood pressure is Normal as reported by the patient. BP Readings from Last 3 Encounters:   05/01/20 119/74   04/26/20 (!) 156/89   02/18/20 (!) 159/100        Pulse is Normal.    Pulse Readings from Last 3 Encounters:   05/01/20 74   04/26/20 92   02/18/20 100       Morbid obesity per BMI. Body mass index is 43.18 kg/m². Weight has been improving, has not been able to eat well since he got sick. Patient has history of Awa-en-Y bariatric surgery for weight loss  Weight is decreasing, she has lost apparently 7 pounds in about 1 week.     Wt Readings from Last 3 Encounters:   05/01/20 284 lb (128.8 kg)   04/26/20 297 lb (134.7 kg)   02/18/20 280 lb (127 kg) range of motion of neck        [] Abnormal-       Neurological:        [x] No Facial Asymmetry (Cranial nerve 7 motor function) (limited exam to video visit)          [x] No gaze palsy        [] Abnormal-            Skin:        [x] No significant exanthematous lesions or discoloration noted on facial skin         [] Abnormal-            Psychiatric:      [x] No Hallucinations       []Mood is normal      [x]Behavior is normal      [x]Judgment is normal      [x]Thought content is normal       [x] Abnormal-anxious    Other pertinent observable physical exam findings-none    Due to this being a TeleHealth encounter, evaluation of the following organ systems is limited: Vitals/Constitutional/EENT/Resp/CV/GI//MS/Neuro/Skin/Heme-Lymph-Imm. Most recent labs reviewed and discussed with the patient: Alkaline phosphatase mildly increased but improved from prior  Otherwise labs within normal limits    Lab Results   Component Value Date    WBC 7.2 04/26/2020    HGB 12.9 04/26/2020    HCT 39.7 04/26/2020    MCV 87.3 04/26/2020     04/26/2020       Lab Results   Component Value Date     04/26/2020    K 4.3 04/26/2020     04/26/2020    CO2 21 04/26/2020    BUN 12 04/26/2020    CREATININE 0.52 04/26/2020    GLUCOSE 107 04/26/2020    GLUCOSE 403 06/03/2012    CALCIUM 8.7 04/26/2020        Lab Results   Component Value Date    ALT 9 04/26/2020    AST 10 04/26/2020    ALKPHOS 135 (H) 04/26/2020    BILITOT <0.15 (L) 04/26/2020           ASSESSMENT/PLAN:    1. Pain of upper abdomen  Failing to change as expected. -RESTART ondansetron (ZOFRAN-ODT) 4 MG disintegrating tablet; Take 1 tablet by mouth 3 times daily as needed for Nausea or Vomiting  Dispense: 21 tablet; Refill: 0  -Carafate 4 times a day as needed, she was advised to dissolve the tablet in water  Continue Pepcid twice a day  Strongly advised to follow-up with her GI for EGD    2.  Painting's esophagus without dysplasia  Likely stable  She is overdue

## 2020-05-03 ENCOUNTER — TELEPHONE (OUTPATIENT)
Dept: FAMILY MEDICINE CLINIC | Age: 40
End: 2020-05-03

## 2020-05-03 PROBLEM — K43.2 RECURRENT INCISIONAL HERNIA: Status: ACTIVE | Noted: 2020-05-03

## 2020-05-03 PROBLEM — Z87.19 HISTORY OF SMALL BOWEL OBSTRUCTION: Status: ACTIVE | Noted: 2020-05-03

## 2020-05-03 PROBLEM — Z87.11 HISTORY OF PEPTIC ULCER: Status: ACTIVE | Noted: 2020-05-03

## 2020-05-03 PROBLEM — R60.0 PERIPHERAL EDEMA: Status: RESOLVED | Noted: 2019-12-05 | Resolved: 2020-05-03

## 2020-05-03 PROBLEM — R60.9 PERIPHERAL EDEMA: Status: RESOLVED | Noted: 2019-12-05 | Resolved: 2020-05-03

## 2020-05-29 ENCOUNTER — PATIENT MESSAGE (OUTPATIENT)
Dept: FAMILY MEDICINE CLINIC | Age: 40
End: 2020-05-29

## 2020-05-29 RX ORDER — ACYCLOVIR 50 MG/G
OINTMENT TOPICAL
Qty: 1 TUBE | Refills: 1 | Status: SHIPPED | OUTPATIENT
Start: 2020-05-29 | End: 2020-09-28

## 2020-05-29 RX ORDER — VALACYCLOVIR HYDROCHLORIDE 1 G/1
1000 TABLET, FILM COATED ORAL 2 TIMES DAILY
Qty: 20 TABLET | Refills: 0 | Status: SHIPPED | OUTPATIENT
Start: 2020-05-29 | End: 2020-06-08 | Stop reason: ALTCHOICE

## 2020-05-29 NOTE — TELEPHONE ENCOUNTER
From: Monet Ramos  To: Ancelmo Allen MD  Sent: 5/29/2020 10:34 AM EDT  Subject: Prescription Question    I am currently taking my oral medication for herpes simplex 2 but am in need of Acyclovir topical to be phoned in. I have been in a flare for roughly 2 weeks with little to no improvement. The topical medication helps greatly. My pharmacy is [facility] on [place] [address]. Thanks so much. Have a great day.

## 2020-06-01 ENCOUNTER — TELEPHONE (OUTPATIENT)
Dept: FAMILY MEDICINE CLINIC | Age: 40
End: 2020-06-01

## 2020-06-08 ENCOUNTER — TELEPHONE (OUTPATIENT)
Dept: FAMILY MEDICINE CLINIC | Age: 40
End: 2020-06-08

## 2020-06-08 RX ORDER — VALACYCLOVIR HYDROCHLORIDE 1 G/1
TABLET, FILM COATED ORAL
Qty: 20 TABLET | Refills: 0 | OUTPATIENT
Start: 2020-06-08

## 2020-06-08 RX ORDER — VALACYCLOVIR HYDROCHLORIDE 500 MG/1
500 TABLET, FILM COATED ORAL DAILY
Qty: 90 TABLET | Refills: 3 | Status: ON HOLD | OUTPATIENT
Start: 2020-06-08 | End: 2022-06-12 | Stop reason: HOSPADM

## 2020-06-08 NOTE — TELEPHONE ENCOUNTER
Patient needs appointment for diabetes she is overdue for A1c     if Medicare we can schedule her for Medicare visit 30 mins, and will also do A1c at that visit

## 2020-06-10 ENCOUNTER — HOSPITAL ENCOUNTER (EMERGENCY)
Age: 40
Discharge: HOME | End: 2020-06-10
Attending: EMERGENCY MEDICINE
Payer: MEDICARE

## 2020-06-10 VITALS
HEIGHT: 68 IN | BODY MASS INDEX: 42.44 KG/M2 | OXYGEN SATURATION: 99 % | TEMPERATURE: 97.2 F | WEIGHT: 280 LBS | RESPIRATION RATE: 16 BRPM | HEART RATE: 109 BPM

## 2020-06-10 PROCEDURE — 99282 EMERGENCY DEPT VISIT SF MDM: CPT

## 2020-06-10 RX ORDER — PENICILLIN V POTASSIUM 500 MG/1
500 TABLET ORAL 4 TIMES DAILY
Qty: 40 TABLET | Refills: 0 | Status: SHIPPED | OUTPATIENT
Start: 2020-06-10 | End: 2020-06-20

## 2020-06-10 RX ORDER — TRAMADOL HYDROCHLORIDE 50 MG/1
50 TABLET ORAL EVERY 6 HOURS PRN
Qty: 10 TABLET | Refills: 0 | Status: SHIPPED | OUTPATIENT
Start: 2020-06-10 | End: 2020-06-13

## 2020-06-10 ASSESSMENT — PAIN DESCRIPTION - PAIN TYPE: TYPE: ACUTE PAIN

## 2020-06-10 ASSESSMENT — PAIN DESCRIPTION - LOCATION: LOCATION: TEETH

## 2020-06-10 ASSESSMENT — PAIN SCALES - GENERAL: PAINLEVEL_OUTOF10: 8

## 2020-06-10 NOTE — ED PROVIDER NOTES
eMERGENCY dEPARTMENT eNCOUnter   Independent Attestation     Pt Name: Ben Skinner  MRN: [id number]  [place] 1980  Date of evaluation: 6/10/20     Ben Skinner is a 35 y.o. female with CC: Dental Pain      Based on the medical record the care appears appropriate. I was personally available for consultation in the Emergency Department.     Jonn Porras MD  Attending Emergency Physician                    Fahad Sanchez MD  06/10/20 [id number]

## 2020-06-11 ENCOUNTER — TELEPHONE (OUTPATIENT)
Dept: FAMILY MEDICINE CLINIC | Age: 40
End: 2020-06-11

## 2020-06-23 RX ORDER — ONDANSETRON 4 MG/1
TABLET, ORALLY DISINTEGRATING ORAL
Qty: 21 TABLET | Refills: 0 | Status: SHIPPED | OUTPATIENT
Start: 2020-06-23 | End: 2022-02-01

## 2020-06-23 NOTE — TELEPHONE ENCOUNTER
Please Approve or Refuse.   Send to Pharmacy per Pt's Request:      Next Visit Date:  Visit date not found   Last Visit Date: 05/01/2020    Hemoglobin A1C (%)   Date Value   12/20/2019 9.1   11/20/2018 9.6 (H)   01/30/2018 10.2             ( goal A1C is < 7)   BP Readings from Last 3 Encounters:   05/01/20 119/74   04/26/20 (!) 156/89   02/18/20 (!) 159/100          (goal 120/80)  BUN   Date Value Ref Range Status   04/26/2020 12 6 - 20 mg/dL Final     CREATININE   Date Value Ref Range Status   04/26/2020 0.52 0.50 - 0.90 mg/dL Final     Potassium   Date Value Ref Range Status   04/26/2020 4.3 3.7 - 5.3 mmol/L Final

## 2020-06-23 NOTE — ED PROVIDER NOTES
EMERGENCY DEPARTMENT ENCOUNTER   ATTENDING ATTESTATION      Pt Name: Lopez Prieto  MRN: [id number]  [place] 1980  Date of evaluation: 4/26/20   Lopez Prieto is a 35 y.o. female with CC: Abdominal Pain (on going x5 days); Nausea & Vomiting; and Constipation (pt reports last BM was 4/21/2020)     MDM:            CRITICAL CARE:         EKG: All EKG's are interpreted by the Emergency Department Physician who either signs or Co-signs this chart in the absence of a cardiologist.        RADIOLOGY:All plain film, CT, MRI, and formal ultrasound images (except ED bedside ultrasound) are read by the radiologist, see reports below, unless otherwise noted in MDM or here. XR CHEST PORTABLE   Final Result   Left basilar minimal linear atelectasis along the diaphragm.       No acute abnormality otherwise           CT ABDOMEN PELVIS W IV CONTRAST Additional Contrast? None    (Results Pending)      LABS: All lab results were reviewed by myself, and all abnormals are listed below. Labs Reviewed   CBC WITH AUTO DIFFERENTIAL   COMPREHENSIVE METABOLIC PANEL W/ REFLEX TO MG FOR LOW K   LIPASE   PROTIME-INR   TROPONIN   LACTIC ACID   LACTATE DEHYDROGENASE               I personally evaluated and examined the patient in conjunction with the APC and agree with the assessment, treatment plan, and disposition of the patient as recorded by the APC.    Cinthia Zacarias MD  Attending Emergency Physician            Expand All Collapse All      Show:Clear all  [x]Manual[x]Template[]Copied    Added by:  [x]Len Anguiano PA-C    []Yamila for details  [address]  [place]       Pt Name: Lopez Prieto  MRN: [id number]  [place] 1980  Date of evaluation: 4/26/20        CHIEF COMPLAINT             Chief Complaint   Patient presents with    Abdominal Pain       on going x5 days    Nausea & Vomiting    Constipation       pt reports last BM was 4/21/2020            HISTORY OF PRESENT ILLNESS BASE) MCG/ACT INHALER    Inhale 2 puffs into the lungs 4 times daily as needed for Wheezing     AMPHETAMINE-DEXTROAMPHETAMINE (ADDERALL, 30MG,) 30 MG TABLET    Take 30 mg by mouth daily.     BENAZEPRIL (LOTENSIN) 20 MG TABLET    Take 20 mg by mouth daily     BLOOD GLUCOSE MONITOR STRIPS    Testing twice a day. Please dispense according to patients insurance.     BUMETANIDE (BUMEX) 0.5 MG TABLET    TAKE 1 TABLET BY MOUTH DAILY AS NEEDED FOR LEG SWELLING     BUPROPION (WELLBUTRIN XL) 300 MG EXTENDED RELEASE TABLET    Take 300 mg by mouth every morning     CYANOCOBALAMIN (B-12) 1000 MCG/ML KIT    Inject as directed     CYANOCOBALAMIN 1000 MCG/ML INJECTION    Inject 1 mL into the muscle once for 1 dose     CYANOCOBALAMIN 1000 MCG/ML INJECTION    Inject 1 mL into the muscle every 7 days     DAPAGLIFLOZIN (FARXIGA) 10 MG TABLET    Take 1 tablet by mouth every morning     DICYCLOMINE (BENTYL) 10 MG CAPSULE    Take 1 capsule by mouth every 6 hours as needed (cramps)     DILTIAZEM (CARDIZEM CD) 240 MG EXTENDED RELEASE CAPSULE    Take 1 capsule by mouth daily     FAMOTIDINE (PEPCID) 20 MG TABLET    Take 1 tablet by mouth 2 times daily     FONDAPARINUX (ARIXTRA) 10 MG/0.8ML SOLN INJECTION    Inject 0.8 mLs into the skin daily     GLUCAGEN HYPOKIT 1 MG SOLR INJECTION         GLUCOSE MONITORING KIT (FREESTYLE) MONITORING KIT    Testing twice a day. Please dispense according to patients insurance.     INSULIN GLARGINE, 1 UNIT DIAL, (TOUJEO SOLOSTAR) 300 UNIT/ML SOPN    Inject 70 Units into the skin every morning     INSULIN PEN NEEDLE 31G X 5 MM MISC    1 each by Does not apply route daily     KLOR-CON M10 10 MEQ EXTENDED RELEASE TABLET    Take 2 tablets by mouth daily as needed (take with bumex)     LAMOTRIGINE (LAMICTAL) 200 MG TABLET    Take 200 mg by mouth daily     LANCETS 30G MISC    Testing twice a day.   Please dispense according to patients insurance.     LORAZEPAM (ATIVAN) 2 MG TABLET         MULTIPLE VITAMINS-MINERALS (THERAPEUTIC MULTIVITAMIN-MINERALS) TABLET    Take 1 tablet by mouth daily     ONDANSETRON (ZOFRAN) 4 MG TABLET    Take 1 tablet by mouth every 8 hours as needed for Nausea or Vomiting     PRAVASTATIN (PRAVACHOL) 40 MG TABLET    Take 40 mg by mouth daily     QUETIAPINE (SEROQUEL) 100 MG TABLET    Take 50 mg by mouth nightly as needed      SYRINGE/NEEDLE, DISP, (SYRINGE 3CC/25GX1\") 25G X 1\" 3 ML MISC    To be used with B12 injections monthly     TIZANIDINE (ZANAFLEX) 4 MG TABLET    Take 1 tablet by mouth every 6 hours as needed (pain/spasm)     VALACYCLOVIR (VALTREX) 500 MG TABLET    Take 500 mg by mouth 2 times daily     VITAMIN D (CHOLECALCIFEROL) 1000 UNIT TABS TABLET    Take 10,000 Units by mouth daily 5 days a week         ALLERGIES     is allergic to asa [aspirin]; betadine [povidone iodine]; celexa [citalopram hydrobromide]; lasix [furosemide]; macrobid [nitrofurantoin]; betadine [povidone iodine]; codeine; lithium; paxil [paroxetine hcl]; tape [adhesive tape]; and tegretol [carbamazepine].     FAMILY HISTORY     She indicated that her mother is alive. She indicated that her father is alive. She indicated that the status of her maternal aunt is unknown. She indicated that the status of her maternal uncle is unknown. She indicated that the status of her maternal cousin is unknown.        SOCIAL HISTORY      reports that she has never smoked. She has never used smokeless tobacco. She reports that she does not drink alcohol or use drugs.     PHYSICAL EXAM     INITIAL VITALS: BP (!) 156/89   Pulse 92   Temp 98.3 °F (36.8 °C) (Temporal)   Resp 17   Ht 5' 8\" (1.727 m)   Wt 297 lb (134.7 kg)   SpO2 100%   BMI 45.16 kg/m²      Physical Exam  Vitals signs and nursing note reviewed. Constitutional:       Appearance: She is well-developed. HENT:      Head: Normocephalic and atraumatic. Cardiovascular:      Rate and Rhythm: Normal rate and regular rhythm. Heart sounds: Normal heart sounds.    Pulmonary: Effort: Pulmonary effort is normal.      Breath sounds: Normal breath sounds. Abdominal:      General: There is distension. Tenderness: There is abdominal tenderness. There is no guarding or rebound. Comments: Hypoactive bowel sounds are noted. She has a numerous old surgical scars. She is complaining of some abdominal distention. Skin:     Capillary Refill: Capillary refill takes less than 2 seconds. Neurological:      Mental Status: She is alert and oriented to person, place, and time.          MEDICAL DECISION MAKING:      She is afebrile she is not tachycardic she is in no distress at upon arrival.  Numerous old surgical scars. Possibility for obstruction versus ileus versus perforation. Will order laboratory tests, CT abdomen pelvis with IV contrast.  Should get IV fluids n.p.o. and antiemetics upon arrival.  Will reevaluate. This time we will sign the patient out to the attending physician Dr. Fifi Noyola. He will disposition the patient at this time no lab results are back and  CT of the abdomen pelvis has not been been resulted.      DIAGNOSTIC RESULTS      EKG: All EKG's are interpreted by the Emergency Department Physician who either signs or Co-signs this chart in the absence of acardiologist.        RADIOLOGY:Allplain film, CT, MRI, and formal ultrasound images (except ED bedside ultrasound) are read by the radiologist and the images and interpretations are directly viewed by the emergency physician.               LABS:All lab results were reviewed by myself, and all abnormals are listed below.   Labs Reviewed   CBC WITH AUTO DIFFERENTIAL   COMPREHENSIVE METABOLIC PANEL W/ REFLEX TO MG FOR LOW K   LIPASE   PROTIME-INR   TROPONIN   LACTIC ACID   LACTATE DEHYDROGENASE            EMERGENCY DEPARTMENT COURSE:   Vitals:    Vitals       Vitals:     04/26/20 2141   BP: (!) 156/89   Pulse: 92   Resp: 17   Temp: 98.3 °F (36.8 °C)   TempSrc: Temporal   SpO2: 100%   Weight: 297 lb (134.7 kg)

## 2020-06-26 RX ORDER — CYANOCOBALAMIN 1000 UG/ML
INJECTION INTRAMUSCULAR; SUBCUTANEOUS
Qty: 4 ML | Refills: 3 | Status: SHIPPED | OUTPATIENT
Start: 2020-06-26 | End: 2021-02-13 | Stop reason: SDUPTHER

## 2020-06-26 RX ORDER — TIZANIDINE 4 MG/1
4 TABLET ORAL 2 TIMES DAILY PRN
Qty: 180 TABLET | Refills: 0 | Status: SHIPPED | OUTPATIENT
Start: 2020-06-26 | End: 2020-09-21

## 2020-06-26 RX ORDER — PRAVASTATIN SODIUM 40 MG
TABLET ORAL
Qty: 90 TABLET | Refills: 3 | Status: SHIPPED | OUTPATIENT
Start: 2020-06-26

## 2020-06-30 RX ORDER — DILTIAZEM HYDROCHLORIDE 240 MG/1
360 CAPSULE, COATED, EXTENDED RELEASE ORAL DAILY
COMMUNITY
End: 2022-06-02 | Stop reason: DRUGHIGH

## 2020-07-11 ENCOUNTER — HOSPITAL ENCOUNTER (EMERGENCY)
Age: 40
Discharge: HOME | End: 2020-07-11
Attending: EMERGENCY MEDICINE
Payer: MEDICARE

## 2020-07-11 VITALS
HEART RATE: 97 BPM | DIASTOLIC BLOOD PRESSURE: 96 MMHG | TEMPERATURE: 98.6 F | SYSTOLIC BLOOD PRESSURE: 144 MMHG | WEIGHT: 290 LBS | RESPIRATION RATE: 18 BRPM | OXYGEN SATURATION: 100 % | BODY MASS INDEX: 43.95 KG/M2 | HEIGHT: 68 IN

## 2020-07-11 PROCEDURE — 99282 EMERGENCY DEPT VISIT SF MDM: CPT

## 2020-07-11 RX ORDER — PENICILLIN V POTASSIUM 500 MG/1
500 TABLET ORAL 4 TIMES DAILY
Qty: 40 TABLET | Refills: 0 | Status: SHIPPED | OUTPATIENT
Start: 2020-07-11 | End: 2020-07-21

## 2020-07-11 RX ORDER — HYDROCODONE BITARTRATE AND ACETAMINOPHEN 5; 325 MG/1; MG/1
1 TABLET ORAL EVERY 6 HOURS PRN
Qty: 10 TABLET | Refills: 0 | Status: SHIPPED | OUTPATIENT
Start: 2020-07-11 | End: 2020-07-14

## 2020-07-11 ASSESSMENT — PAIN SCALES - GENERAL: PAINLEVEL_OUTOF10: 9

## 2020-07-11 NOTE — ED PROVIDER NOTES
EMERGENCY DEPARTMENT ENCOUNTER   INDEPENDENT ATTESTATION     Pt Name: Genevieve Staton  MRN: [id number]  [place] 1980  Date of evaluation: 7/11/20     Genevieve Staton is a 38 y.o. female with CC: Dental Problem      I was personally available for consultation in the Emergency Department.     Neelam Wright MD  Attending Emergency Physician                    Neelam Wright MD  07/11/20 2549       Neelam Wright MD  07/13/20 1046

## 2020-07-11 NOTE — ED PROVIDER NOTES
[address]  eMERGENCY dEPARTMENT eNCOUnter      Pt Name: Darrion Campos  MRN: [id number]  [place] 1980  Date of evaluation: 7/11/20      CHIEF COMPLAINT:   Chief Complaint   Patient presents with    Dental Problem     HISTORY OF PRESENT ILLNESS    Darrion Campos is a 38 y.o. female who presents with complaints of left lower dental pain. Pt reports she was scheduled to have her teeth pulled prior to covid pandemic. Pt reports due to the pandemic all elective procedures were canceled. Pt states she is now having difficulty finding a new surgeon that takes her insurance. Currently, pain is 10/10 over left lower. Denies fever, chills, trouble breathing/ swallowing, n/v/abd pain/CP/SOB. Pt has tried tylenol with no relief. States she was on an abx a month ago. No other complaints. REVIEW OF SYSTEMS     Constitutional: Denies recent fever, chills. Eyes: No visual changes. Neck: No neck pain. Respiratory: Denies recent shortness of breath. Cardiac:  Denies recent chest pain. GI: denies any recent abdominal pain nausea or vomiting. Denies Blood in the stool or black tarry stools. : denies dysuria. Musculoskeletal: Denies focal weakness. Neurologic: denies headache or focal weakness. Skin:  Denies any rash. Negative in 10 essential Systems except as mentioned above and in the HPI.       PAST MEDICAL HISTORY   PMH:  has a past medical history of Alcohol abuse, Anxiety, Arthritis, Painting esophagus, Benzodiazepine overdose, Bipolar disorder, unspecified ([address] Bipolar I disorder, most recent episode depressed, severe without psychotic features ([address] Cellulitis, Chronic abdominal wound infection, Constipation, Depression, Drug overdose, intentional ([address] Genital herpes, unspecified, GERD (gastroesophageal reflux disease), History of pulmonary embolism, Hx of blood clots, Hypertension, Iron deficiency anemia, Isopropyl alcohol poisoning, Lumbosacral spondylosis without myelopathy, MDRO (multiple drug resistant organisms) resistance, Miscarriage, Morbid obesity (HCC), MRSA (methicillin resistant staph aureus) culture positive, Overdose of benzodiazepine, Pernicious anemia, Primary hypercoagulable state ([facility] [address] Pulmonary embolism ([facility] [address] Suicidal behavior, Suicidal ideation, Suicide attempt ([facility] [address] SVT (supraventricular tachycardia) ([facility] [address] Toxic effect of ethanol, intentional self-harm ([facility] [address] and Type 2 diabetes mellitus, with long-term current use of insulin ([facility] [address] none otherwise stated in nurses notes  Surgical History:  has a past surgical history that includes Cholecystectomy; Liver Resection; hernia repair; Tonsillectomy; Endoscopy, colon, diagnostic; Abdominal hernia repair (2014); Abdominal hernia repair (11/3/14); Bariatric Surgery (2013); Dilation and curettage of uterus (2005); Finger amputation (Left, 02/09/2015); lymph node biopsy (1990); yariel and bso (cervix removed) (2011); and IVC filter insertion. none otherwise stated in nurses notes  Social History:  reports that she has never smoked. She has never used smokeless tobacco. She reports that she does not drink alcohol or use drugs. none otherwise stated in nurses notes  Family History: none otherwise stated in nurses notes  Psychiatric History: none otherwise stated in nurses notes    Allergies:is allergic to asa [aspirin]; betadine [povidone iodine]; celexa [citalopram hydrobromide]; citalopram; lasix [furosemide]; macrobid [nitrofurantoin]; betadine [povidone iodine]; codeine; lithium; paroxetine; paxil [paroxetine hcl]; tape [adhesive tape]; and tegretol [carbamazepine]. PHYSICAL EXAM     INITIAL VITALS: BP (!) 144/96   Pulse 97   Temp 98.6 °F (37 °C) (Oral)   Resp 18   Ht 5' 8\" (1.727 m)   Wt 290 lb (131.5 kg)   SpO2 100%   BMI 44.09 kg/m²   CONSTITUTIONAL: Vital signs reviewed, Alert and oriented X 3. HEAD: Atraumatic, Normocephalic.    EYES: Eyes are normal to inspection, Pupils equal, round and reactive to light. NECK: Normal ROM, No jugular venous distention, No meningeal signs, Cervical spine nontender. MOUTH:  + dental pain at 20, 21, poor dentition noted, with no signs of abscess formation or Anthony sign noted. No swelling involving the airway at all. No trismus. Lips are normal.  No tongue elevation. No tenderness over floor of mouth. Controlling secretions. Speaking in full sentences. RESPIRATORY CHEST: No respiratory distress. ABDOMEN: Abdomen is nontender, No distension. UPPER EXTREMITY: Inspection normal, No cyanosis. NEURO: GCS is 15, Speech normal, Memory normal.   SKIN: Skin is warm, Skin is dry. PSYCHIATRIC: Oriented X 3, Normal affect. EMERGENCY DEPARTMENT COURSE:   Pain meds and antibiotic prescriptions. Pt provided with dental clinic list and instructed to call as soon as possible for an appointment. Instructed to return for worsening or any new symptoms including throat swelling, difficulty swallowing or breathing. Pt agrees. FINAL IMPRESSION:     1. Pain, dental          DISPOSITION:  DISPOSITION Decision To Discharge 07/11/2020 06:48:55 PM        PATIENT REFERRED TO:  Gay Zurita MD  [address] [address]  [phone]          [id number]  [facility] [address] [place] [id number] [phone]        dentist  see list          DISCHARGE MEDICATIONS:  New Prescriptions    HYDROCODONE-ACETAMINOPHEN (NORCO) 5-325 MG PER TABLET    Take 1 tablet by mouth every 6 hours as needed for Pain for up to 3 days. Intended supply: 3 days.  Take lowest dose possible to manage pain    PENICILLIN V POTASSIUM (VEETID) 500 MG TABLET    Take 1 tablet by mouth 4 times daily for 10 days       (Please note that portions of this note were completed with a voice recognition program.  Efforts were made to edit the dictations but occasionally words are mis-transcribed.)    BRIAN Rodriguez, BRIAN  07/11/20 1907

## 2020-07-11 NOTE — ED NOTES
Mode of arrival: walk in        Chief complaints: Dental problem        Scenario: Pt states she has been having dental pain for a while and dental abscess in left back lower teeth. Pt states she was on a round of PCN over a month ago but has not made it to the dentist. No swelling in jaw noted. Poor dental hygiene. C= \"Have you ever felt that you should Cut down on your drinking? \"  No  A= \"Have people Annoyed you by criticizing your drinking? \"  No  G= \"Have you ever felt bad or Guilty about your drinking? \"  No  E= \"Have you ever had a drink as an Eye-opener first thing in the morning to steady your nerves or to help a hangover? \"  No      Deferred []      Reason for deferring: N/A    *If yes to two or more: probable alcohol abuse. Paul Edwards RN  07/11/20 9446

## 2020-07-13 ENCOUNTER — TELEPHONE (OUTPATIENT)
Dept: FAMILY MEDICINE CLINIC | Age: 40
End: 2020-07-13

## 2020-07-13 NOTE — TELEPHONE ENCOUNTER
[address]  ED Follow up Call    Reason for ED visit:  Dental pain     7/13/2020         FU appts/Provider:    No future appointments. VOICEMAIL DOCUMENTATION - ERASE IF NOT USED  Hi, this message is for Rick porras. This is Major Norm from [address] [place] [address] office. Just calling to see how you are doing after your recent visit to the Emergency Room. [address] [place] [address] wants to make sure you were able to fill any prescriptions and that you understand your discharge instructions. Please return our call if you need to make a follow up appointment with your provider or have any further needs. Our phone number is [phone]. Have a great day.

## 2020-07-27 RX ORDER — SUCRALFATE 1 G/1
TABLET ORAL
Qty: 360 TABLET | Refills: 5 | Status: SHIPPED | OUTPATIENT
Start: 2020-07-27 | End: 2022-06-02

## 2020-07-27 NOTE — TELEPHONE ENCOUNTER
Please Approve or Refuse.   Send to Pharmacy per Pt's Request:      Next Visit Date:  8/5/2020   Last Visit Date: 12/20/2019    Hemoglobin A1C (%)   Date Value   12/20/2019 9.1   11/20/2018 9.6 (H)   01/30/2018 10.2             ( goal A1C is < 7)   BP Readings from Last 3 Encounters:   07/11/20 (!) 144/96   05/01/20 119/74   04/26/20 (!) 156/89          (goal 120/80)  BUN   Date Value Ref Range Status   04/26/2020 12 6 - 20 mg/dL Final     CREATININE   Date Value Ref Range Status   04/26/2020 0.52 0.50 - 0.90 mg/dL Final     Potassium   Date Value Ref Range Status   04/26/2020 4.3 3.7 - 5.3 mmol/L Final

## 2020-07-30 ENCOUNTER — TELEPHONE (OUTPATIENT)
Dept: FAMILY MEDICINE CLINIC | Age: 40
End: 2020-07-30

## 2020-08-05 ENCOUNTER — TELEMEDICINE (OUTPATIENT)
Dept: FAMILY MEDICINE CLINIC | Age: 40
End: 2020-08-05
Payer: MEDICARE

## 2020-08-05 PROCEDURE — 99214 OFFICE O/P EST MOD 30 MIN: CPT | Performed by: FAMILY MEDICINE

## 2020-08-05 PROCEDURE — 3046F HEMOGLOBIN A1C LEVEL >9.0%: CPT | Performed by: FAMILY MEDICINE

## 2020-08-05 PROCEDURE — G8427 DOCREV CUR MEDS BY ELIG CLIN: HCPCS | Performed by: FAMILY MEDICINE

## 2020-08-05 PROCEDURE — 2022F DILAT RTA XM EVC RTNOPTHY: CPT | Performed by: FAMILY MEDICINE

## 2020-08-05 RX ORDER — HYDROCODONE BITARTRATE AND ACETAMINOPHEN 5; 325 MG/1; MG/1
1 TABLET ORAL EVERY 8 HOURS PRN
Qty: 9 TABLET | Refills: 0 | Status: SHIPPED | OUTPATIENT
Start: 2020-08-05 | End: 2020-08-08

## 2020-08-05 RX ORDER — CLINDAMYCIN HYDROCHLORIDE 300 MG/1
300 CAPSULE ORAL 4 TIMES DAILY
Qty: 40 CAPSULE | Refills: 0 | Status: SHIPPED | OUTPATIENT
Start: 2020-08-05 | End: 2020-08-15

## 2020-08-05 ASSESSMENT — ENCOUNTER SYMPTOMS
CHEST TIGHTNESS: 0
COUGH: 0
WHEEZING: 0
ABDOMINAL PAIN: 0
SHORTNESS OF BREATH: 0

## 2020-08-05 ASSESSMENT — PATIENT HEALTH QUESTIONNAIRE - PHQ9
SUM OF ALL RESPONSES TO PHQ QUESTIONS 1-9: 0
2. FEELING DOWN, DEPRESSED OR HOPELESS: 0
SUM OF ALL RESPONSES TO PHQ QUESTIONS 1-9: 0
1. LITTLE INTEREST OR PLEASURE IN DOING THINGS: 0
SUM OF ALL RESPONSES TO PHQ9 QUESTIONS 1 & 2: 0

## 2020-08-05 NOTE — PROGRESS NOTES
2020    TELEHEALTH EVALUATION -- Audio/Visual (During SLMJE-78 public health emergency)    HPI:    Gen Taveras (:  1980) has requested an audio/video evaluation for the following concern(s):ED Follow-up (x 3 abcess in the mouth) and Diabetes    Was seen in ED  3 times per her saying due to recurrent tooth abscess  Last ED visit was on 2020 at [facility], and she was given penicillin and hydrocodone. Patient reports she has dental cavities, especially on the lower buttom molars, giving her ear pain, since . Patient says every time she goes to the emergency room, she is told she needs to see an oral surgeon, but nobody is taking hers  insurance in [place]. Her insurance gave her an oral surgeon name and location, who is in [place], but is too far away for her. I gave her another name, and she tells me they would not accept her because she does not live in [facility]  Patient says she also called [facility], but they would not take her. Currently patient reports having abscess in the lower mouth on the right side giving her ear pain. Getting fever, chills, sweating, getting swolling behind the ear. Patient says when she finished the antibiotic, the abscess returned  Patient says she is out of Norco and the pain is 10 out of 10  Fever, she reports having 102.0, taking tylenol to keep the temperature down. Has tried warm salty water, and listerine, but the abscess is still coming back      Bipolar disorder  Patient goes to psychiatrist at [facility]  Denies suicidal ideation, plan or intent. Patient is on Lamictal, Seroquel and Wellbutrin, tolerates them well      PHQ-2 Over the past 2 weeks, how often have you been bothered by any of the following problems?   Little interest or pleasure in doing things: Not at all  Feeling down, depressed, or hopeless: Not at all  PHQ-2 Score: 0  PHQ-9 Over the past 2 weeks, how often have you been bothered by any of the following patients insurance. Yes Jaimie Gutierrez MD   blood glucose monitor strips Testing twice a day. Please dispense according to patients insurance. Yes Jaimie Gutierrez MD   fondaparinux (ARIXTRA) 10 MG/0.8ML SOLN injection Inject 0.8 mLs into the skin daily Yes Jaimie Gutierrez MD   KLOR-CON M10 10 MEQ extended release tablet Take 2 tablets by mouth daily as needed (take with bumex) Yes Jaimie Gutierrez MD   dapagliflozin (FARXIGA) 10 MG tablet Take 1 tablet by mouth every morning Yes Jaimie Gutierrez MD   Syringe/Needle, Disp, (SYRINGE 3CC/25GX1\") 25G X 1\" 3 ML MISC To be used with B12 injections monthly Yes Jaimie Gutierrez MD   bumetanide (BUMEX) 0.5 MG tablet TAKE 1 TABLET BY MOUTH DAILY AS NEEDED FOR LEG SWELLING Yes Jaimie Gutierrez MD   lamoTRIgine (LAMICTAL) 200 MG tablet Take 200 mg by mouth daily Yes Historical Provider, MD   amphetamine-dextroamphetamine (ADDERALL, 30MG,) 30 MG tablet Take 30 mg by mouth daily.  Yes Historical Provider, MD   albuterol sulfate  (90 Base) MCG/ACT inhaler Inhale 2 puffs into the lungs 4 times daily as needed for Wheezing Yes BERNARDA Morataya - CNP   dicyclomine (BENTYL) 10 MG capsule Take 1 capsule by mouth every 6 hours as needed (cramps) Yes Jose Eduardo Dukes MD   QUEtiapine (SEROQUEL) 100 MG tablet Take 50 mg by mouth nightly as needed  Yes Historical Provider, MD   Multiple Vitamins-Minerals (THERAPEUTIC MULTIVITAMIN-MINERALS) tablet Take 1 tablet by mouth daily Yes Historical Provider, MD   buPROPion (WELLBUTRIN XL) 300 MG extended release tablet Take 300 mg by mouth every morning Yes Historical Provider, MD   vitamin D (CHOLECALCIFEROL) 1000 UNIT TABS tablet Take 10,000 Units by mouth daily 5 days a week Yes Historical Provider, MD   cyanocobalamin 1000 MCG/ML injection Inject 1 mL into the muscle once for 1 dose  Jaimie Gutierrez MD       Social History     Tobacco Use    Smoking status: Never Smoker    Smokeless tobacco: Never Used Substance Use Topics    Alcohol use: No     Alcohol/week: 0.0 standard drinks     Comment: Last alcohol use was in 12/2015    Drug use: No     Comment: Pt stole her mom's Benzo 1 yr ago. Pt said she took more than prescribed dose of Valium once in late 90's. PHYSICAL EXAMINATION:    Vital Signs: (As obtained by patient/caregiver or practitioner observation)  -vital signs stable and within normal limits except fever, pain level, and morbid obesity per last BMI, BMI 41.95 kg/M2  Patient-Reported Vitals 8/10/2020   Patient-Reported Weight 280 lbs   Patient-Reported Height 5 ft 8.5 in   Patient-Reported Temperature 102 F       Intensity of pain is 10 out of 10       Constitutional: [x] Appears well-developed and well-nourished [x] No apparent distress      [x] Abnormal-looks tired      Mental status  [x] Alert and awake  [x] Oriented to person/place/time [x]Able to follow commands      Eyes:  EOM    [x]  Normal  [] Abnormal-  Sclera  [x]  Normal  [] Abnormal -         Discharge [x]  None visible  [] Abnormal -    HENT:   [x] Normocephalic, atraumatic.   [x] Abnormal looks mildly swollen on the right lower jaw  [x] Mouth/Throat: Mucous membranes are moist.     External Ears [x] Normal  [] Abnormal-     Neck: [x] No visualized mass     Pulmonary/Chest: [x] Respiratory effort normal.  [x] No visualized signs of difficulty breathing or respiratory distress        [] Abnormal        Musculoskeletal:   [x] Normal gait with no signs of ataxia         [x] Normal range of motion of neck        [] Abnormal-       Neurological:        [x] No Facial Asymmetry (Cranial nerve 7 motor function) (limited exam to video visit)          [x] No gaze palsy        [] Abnormal-            Skin:        [x] No significant exanthematous lesions or discoloration noted on facial skin         [] Abnormal-            Psychiatric:      [x] No Hallucinations       []Mood is normal      [x]Behavior is normal      [x]Judgment is normal [x]Thought content is normal       [x] Abnormal-anxious    Other pertinent observable physical exam findings-none  Due to this being a TeleHealth encounter, evaluation of the following organ systems is limited: Vitals/Constitutional/EENT/Resp/CV/GI//MS/Neuro/Skin/Heme-Lymph-Imm. Most recent labs reviewed with the patient and all questions fully answered.    Hyperglycemia  Increased alkaline phosphatase  Vitamin B12 deficiency, she is taking B12 injections at home  Hyperlipidemia improved  Vitamin D deficiency she is taking vitamin D supplementation  Otherwise labs within normal limits        Lab Results   Component Value Date    WBC 7.2 04/26/2020    HGB 12.9 04/26/2020    HCT 39.7 04/26/2020    MCV 87.3 04/26/2020     04/26/2020       Lab Results   Component Value Date     04/26/2020    K 4.3 04/26/2020     04/26/2020    CO2 21 04/26/2020    BUN 12 04/26/2020    CREATININE 0.52 04/26/2020    GLUCOSE 107 04/26/2020    GLUCOSE 403 06/03/2012    CALCIUM 8.7 04/26/2020        Lab Results   Component Value Date    ALT 9 04/26/2020    AST 10 04/26/2020    ALKPHOS 135 (H) 04/26/2020    BILITOT <0.15 (L) 04/26/2020       Lab Results   Component Value Date    TSH 1.87 01/07/2020       Lab Results   Component Value Date    CHOL 154 01/07/2020    CHOL 190 04/08/2017    CHOL 250 10/20/2015     Lab Results   Component Value Date    TRIG 102 01/07/2020    TRIG 298 (H) 04/08/2017    TRIG 109 10/20/2015     Lab Results   Component Value Date    HDL 55 01/07/2020    HDL 57 04/08/2017    HDL 70 10/20/2015     Lab Results   Component Value Date    LDLCALC 79 01/07/2020    LDLCALC 158 10/20/2015    LDLCHOLESTEROL 73 04/08/2017    LDLCHOLESTEROL      09/26/2015    LDLCHOLESTEROL 59 10/25/2013       Lab Results   Component Value Date    CHOLHDLRATIO 2.8 01/07/2020    CHOLHDLRATIO 3.3 04/08/2017    CHOLHDLRATIO 3.6 10/20/2015       Lab Results   Component Value Date    LABA1C 9.1 12/20/2019    LABA1C 9.6 (H) 11/20/2018    LABA1C 10.2 01/30/2018         Lab Results   Component Value Date    NQNQXBLW62 236 01/07/2020       Lab Results   Component Value Date    FOLATE 14.3 01/07/2020       Lab Results   Component Value Date    IRON 49 01/30/2017    TIBC 209 (L) 01/30/2017    FERRITIN 66 02/28/2017       Lab Results   Component Value Date    VITD25 30.4 01/07/2020       ASSESSMENT/PLAN:    1. Abscess of oral tissue  Failing to change as expected. recurrent  - clindamycin (CLEOCIN) 300 MG capsule; Take 1 capsule by mouth 4 times daily for 10 days  Dispense: 40 capsule; Refill: 0  - HYDROcodone-acetaminophen (NORCO) 5-325 MG per tablet; Take 1 tablet by mouth every 8 hours as needed for Pain for up to 3 days. Take lowest dose possible to manage pain  Dispense: 9 tablet; Refill: 0    If fever not going down in 1 day advised to go to [facility] ED which is connected with dental residency  The patient verbalizes understanding and agrees with the plan. 2. Bipolar affective disorder in remission [facility])  Improved  Continue current treatment and follow up with psychiatrist and psychologist as scheduled. 3. Type 2 diabetes mellitus with diabetic polyneuropathy, with long-term current use of insulin ([facility])  Improving  - CBC Auto Differential; Future  - Comprehensive Metabolic Panel; Future  - Hemoglobin A1C; Future  -advised home blood glucose testing  daily  -daily feet exam, Foot care: advised to wash feet daily, pat dry and apply lotion at night, avoiding between toes. Need to look at feet daily and report to a physician any signs of inflammation or skin damage  -annual dilated eye exam  -Low carb, low fat diet, increase fruits and vegetables, and exercise 4-5 times a day 30-40 minutes a day discussed  -continue current treatment  -continue ACEI and statin    4.  Hyperlipidemia with target LDL less than 100   Well controlled  Continue Pravachol      Controlled Substance Monitoring:    Acute and Chronic Pain Monitoring:   RX Monitoring 8/5/2020   Attestation -   Periodic Controlled Substance Monitoring Possible medication side effects, risk of tolerance/dependence & alternative treatments discussed. ;No signs of potential drug abuse or diversion identified. ;Assessed functional status. Jennifer Mcintyre received counseling on the following healthy behaviors: nutrition, exercise, medication adherence and weight loss  Reviewed prior labs and health maintenance. Continue current medications, diet and exercise. Discussed use, benefit, and side effects of prescribed medications. Barriers to medication compliance addressed. Patient given educational materials - see patient instructions. All patient questions answered. Patient voiced understanding. Return in about 3 months (around 11/5/2020) for AWM. Needs hep B in 1-2 weeks      Future Appointments   Date Time Provider Maria Luisa Walker   11/27/2020  8:00 AM [facility]        Total time spent during this visit 25 minutes including face-to-face, counseling, charting review, and non-face-to-face time. Kavita Babin is a 38 y.o. female being evaluated by a Virtual Visit (video visit) encounter to address concerns as mentioned above. Due to this being a TeleHealth encounter (During [address] emergency), evaluation of the following organ systems was limited: Vitals/Constitutional/EENT/Resp/CV/GI//MS/Neuro/Skin/Heme-Lymph-Imm. Pursuant to the emergency declaration under the [facility], [facility] waiver authority and the [employer] General Act, this Virtual Visit was conducted with patient's (and/or legal guardian's) consent, to reduce the patient's risk of exposure to COVID-19 and provide necessary medical care.   The patient (and/or legal guardian) has also been advised to contact this office for worsening conditions or problems, and seek emergency medical treatment and/or call 911 if deemed necessary. Services were provided through a video synchronous discussion virtually to substitute for in-person clinic visit. Patient was located at his home, provider was located in the office, at the primary practice location. Patient identification was verified at the start of the visit: Yes    Total time spent for this encounter: 25 minutes     --Elijah Melendrez MD on 8/10/2020 at 2:44 PM    An electronic signature was used to authenticate this note.

## 2020-08-05 NOTE — PATIENT INSTRUCTIONS
Patient Education        Learning About Diabetes Food Guidelines  Your Care Instructions     Meal planning is important to manage diabetes. It helps keep your blood sugar at a target level (which you set with your doctor). You don't have to eat special foods. You can eat what your family eats, including sweets once in a while. But you do have to pay attention to how often you eat and how much you eat of certain foods. You may want to work with a dietitian or a certified diabetes educator (CDE) to help you plan meals and snacks. A dietitian or CDE can also help you lose weight if that is one of your goals. What should you know about eating carbs? Managing the amount of carbohydrate (carbs) you eat is an important part of healthy meals when you have diabetes. Carbohydrate is found in many foods. · Learn which foods have carbs. And learn the amounts of carbs in different foods. ? Bread, cereal, pasta, and rice have about 15 grams of carbs in a serving. A serving is 1 slice of bread (1 ounce), ½ cup of cooked cereal, or 1/3 cup of cooked pasta or rice. ? Fruits have 15 grams of carbs in a serving. A serving is 1 small fresh fruit, such as an apple or orange; ½ of a banana; ½ cup of cooked or canned fruit; ½ cup of fruit juice; 1 cup of melon or raspberries; or 2 tablespoons of dried fruit. ? Milk and no-sugar-added yogurt have 15 grams of carbs in a serving. A serving is 1 cup of milk or 2/3 cup of no-sugar-added yogurt. ? Starchy vegetables have 15 grams of carbs in a serving. A serving is ½ cup of mashed potatoes or sweet potato; 1 cup winter squash; ½ of a small baked potato; ½ cup of cooked beans; or ½ cup cooked corn or green peas. · Learn how much carbs to eat each day and at each meal. A dietitian or CDE can teach you how to keep track of the amount of carbs you eat. This is called carbohydrate counting. · If you are not sure how to count carbohydrate grams, use the Plate Method to plan meals.  It is a good, quick way to make sure that you have a balanced meal. It also helps you spread carbs throughout the day. ? Divide your plate by types of foods. Put non-starchy vegetables on half the plate, meat or other protein food on one-quarter of the plate, and a grain or starchy vegetable in the final quarter of the plate. To this you can add a small piece of fruit and 1 cup of milk or yogurt, depending on how many carbs you are supposed to eat at a meal.  · Try to eat about the same amount of carbs at each meal. Do not \"save up\" your daily allowance of carbs to eat at one meal.  · Proteins have very little or no carbs per serving. Examples of proteins are beef, chicken, turkey, fish, eggs, tofu, cheese, cottage cheese, and peanut butter. A serving size of meat is 3 ounces, which is about the size of a deck of cards. Examples of meat substitute serving sizes (equal to 1 ounce of meat) are 1/4 cup of cottage cheese, 1 egg, 1 tablespoon of peanut butter, and ½ cup of tofu. How can you eat out and still eat healthy? · Learn to estimate the serving sizes of foods that have carbohydrate. If you measure food at home, it will be easier to estimate the amount in a serving of restaurant food. · If the meal you order has too much carbohydrate (such as potatoes, corn, or baked beans), ask to have a low-carbohydrate food instead. Ask for a salad or green vegetables. · If you use insulin, check your blood sugar before and after eating out to help you plan how much to eat in the future. · If you eat more carbohydrate at a meal than you had planned, take a walk or do other exercise. This will help lower your blood sugar. What else should you know? · Limit saturated fat, such as the fat from meat and dairy products. This is a healthy choice because people who have diabetes are at higher risk of heart disease. So choose lean cuts of meat and nonfat or low-fat dairy products.  Use olive or canola oil instead of butter or shortening when cooking. · Don't skip meals. Your blood sugar may drop too low if you skip meals and take insulin or certain medicines for diabetes. · Check with your doctor before you drink alcohol. Alcohol can cause your blood sugar to drop too low. Alcohol can also cause a bad reaction if you take certain diabetes medicines. Follow-up care is a key part of your treatment and safety. Be sure to make and go to all appointments, and call your doctor if you are having problems. It's also a good idea to know your test results and keep a list of the medicines you take. Where can you learn more? Go to [email or web address] org and sign in to your [employer] account. Enter P011 in the [employer] box to learn more about \"Learning About Diabetes Food Guidelines. \"     If you do not have an account, please click on the \"Sign Up Now\" link. Current as of: December 20, 2019               Content Version: 12.5  © 0830-3807 [employer]. Care instructions adapted under license by [facility] ([facility]). If you have questions about a medical condition or this instruction, always ask your healthcare professional. [place] [address] any warranty or liability for your use of this information.

## 2020-08-07 ENCOUNTER — HOSPITAL ENCOUNTER (EMERGENCY)
Age: 40
Discharge: HOME | End: 2020-08-07
Attending: EMERGENCY MEDICINE
Payer: MEDICARE

## 2020-08-07 VITALS
OXYGEN SATURATION: 98 % | WEIGHT: 280 LBS | BODY MASS INDEX: 41.47 KG/M2 | HEIGHT: 69 IN | RESPIRATION RATE: 20 BRPM | TEMPERATURE: 97.7 F | SYSTOLIC BLOOD PRESSURE: 123 MMHG | DIASTOLIC BLOOD PRESSURE: 72 MMHG | HEART RATE: 100 BPM

## 2020-08-07 PROCEDURE — 6360000002 HC RX W HCPCS: Performed by: EMERGENCY MEDICINE

## 2020-08-07 PROCEDURE — 96372 THER/PROPH/DIAG INJ SC/IM: CPT

## 2020-08-07 PROCEDURE — 99282 EMERGENCY DEPT VISIT SF MDM: CPT

## 2020-08-07 RX ORDER — MORPHINE SULFATE 4 MG/ML
4 INJECTION, SOLUTION INTRAMUSCULAR; INTRAVENOUS ONCE
Status: COMPLETED | OUTPATIENT
Start: 2020-08-07 | End: 2020-08-07

## 2020-08-07 RX ADMIN — MORPHINE SULFATE 4 MG: 4 INJECTION, SOLUTION INTRAMUSCULAR; INTRAVENOUS at 16:32

## 2020-08-07 ASSESSMENT — PAIN SCALES - GENERAL
PAINLEVEL_OUTOF10: 8
PAINLEVEL_OUTOF10: 8

## 2020-08-07 ASSESSMENT — ENCOUNTER SYMPTOMS
FACIAL SWELLING: 0
TRISMUS: 0

## 2020-08-07 ASSESSMENT — PAIN DESCRIPTION - PAIN TYPE: TYPE: ACUTE PAIN

## 2020-08-07 ASSESSMENT — PAIN DESCRIPTION - LOCATION: LOCATION: TEETH

## 2020-08-07 NOTE — ED PROVIDER NOTES
EMERGENCY DEPARTMENT ENCOUNTER    Pt Name: Sunil Roca  MRN: [id number]  [place] 1980  Date of evaluation: 8/7/20  CHIEF COMPLAINT       Chief Complaint   Patient presents with    Dental Pain     HISTORY OF PRESENT ILLNESS   Pt presents c/o worsening tooth pain x 2 days that has been intermittent for the last few months. Pt unable to see dentist d/t insurance issues. Pt seen at PCP yesterday and started on clindamycin, taking as prescribed, and she was given a prescription for [place]. Pt states Pa Nguyen was making her \"itchy. \" Denies N/V/D, headache, difficulty breathing or swallowing, drooling, fever. The history is provided by the patient. Dental Pain   Location:  Upper  Quality:  Aching and constant  Severity:  Moderate  Duration:  2 days  Timing:  Constant  Progression:  Unchanged  Chronicity:  Chronic  Context: dental caries    Relieved by:  Nothing  Worsened by:  Cold food/drink, hot food/drink and touching  Associated symptoms: no congestion, no difficulty swallowing, no drooling, no facial pain, no facial swelling, no fever, no gum swelling, no headaches, no neck pain, no oral bleeding, no oral lesions and no trismus    Risk factors: diabetes and lack of dental care        REVIEW OF SYSTEMS     Review of Systems   Constitutional: Negative for fever. HENT: Positive for dental problem. Negative for congestion, drooling, facial swelling, mouth sores and sore throat. Eyes: Negative for photophobia, pain and redness. Respiratory: Negative for shortness of breath. Cardiovascular: Negative for chest pain. Gastrointestinal: Negative for abdominal pain. Genitourinary: Negative for flank pain. Musculoskeletal: Negative for neck pain. Neurological: Negative for speech difficulty and headaches. Psychiatric/Behavioral: Negative for agitation.      PASTMEDICAL HISTORY     Past Medical History:   Diagnosis Date    Alcohol abuse     sober since5    Anxiety     Arthritis     Painting esophagus     Benzodiazepine overdose 9/26/2015    B[facility] disorder, unspecified [facility]     Bipolar I disorder, most recent episode depressed, severe without psychotic features ([address]     Cellulitis 11/17/2018    Chronic abdominal wound infection 9/27/2015    Constipation 9/3/2019    Depression 7/12/2015    Drug overdose, intentional ([address] 7/12/2015    Genital herpes, unspecified     GERD (gastroesophageal reflux disease)     History of pulmonary embolism     Hx of blood clots dx 2 years ago    clots in both legs and lungs     Hypertension     Iron deficiency anemia     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    Lumbosacral spondylosis without myelopathy     MDRO (multiple drug resistant organisms) resistance 2010    MRSA (abd)    Miscarriage     multiple, around 7th mos pregnant each time d/t hypercoagulation    Morbid obesity ([address]     MRSA (methicillin resistant staph aureus) culture positive 02/10/2017    urine    Overdose of benzodiazepine     Pernicious anemia     Primary hypercoagulable state ([address]     antiphospholipid antibodies on Arixtra shots daily    Pulmonary embolism ([facility] 2010    Suicidal behavior 12/14/2015    Suicidal ideation     Suicide attempt ([address] 2014    hx OD on painkillers and rubbing alcohol    SVT (supraventricular tachycardia) ([facility] 04/07/2017    Toxic effect of ethanol, intentional self-harm ([facility] 12/16/2015    Type 2 diabetes mellitus, with long-term current use of insulin ([facility])      Past Problem List  Patient Active Problem List   Diagnosis Code    Pernicious anemia D51.0    History of ulcer disease Z87.898    History of DVT of lower extremity Z86.718    Primary hypercoagulable state ([address] D68.59    Amputation finger S68.119A    GERD (gastroesophageal reflux disease) K21.9    Alcohol dependence in remission ([address] F10.21    Type 2 diabetes mellitus with diabetic polyneuropathy, with long-term current use of insulin ([facility]) E11.42, Z79.4    Primary insomnia F51.01    Essential hypertension I10    Bipolar affective disorder in remission ([facility] F31.70    History of pulmonary embolism Z86.711    Antiphospholipid syndrome (HCC) D68.61    Hyperlipidemia with target LDL less than 100 E78.5    Chronic post-traumatic stress disorder (PTSD) F43.12    OCD (obsessive compulsive disorder) F42.9    Severe benzodiazepine use disorder (HCC) F13.20    Morbid obesity with BMI of 50.0-59.9, adult ([facility]) E66.01, Z68.43    History of MRSA infection Z86.14    Pain of upper abdomen R10.10    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    NAFLD (nonalcoholic fatty liver disease) K76.0    Nondependent opioid abuse in remission ([facility] F11.11    Osteopenia M85.80    History of Awa-en-Y gastric bypass Z98.84    Herpes genitalis A60.00    History of drug abuse ([facility]) F19.11    Malabsorption K90.9    History of pulmonary embolus (PE) Z86.711    Vitamin B 12 deficiency E53.8    Vitamin D deficiency E55.9    History of surgery of liver Z98.890    Blood alkaline phosphatase increased compared with prior measurement R74.8    Paroxysmal SVT (supraventricular tachycardia) ([facility]) I47.1    Anxiety F41.9    Hypomagnesemia E83.42    Chronic idiopathic constipation K59.04    Recurrent incisional hernia K43.2    History of small bowel obstruction Z87.19    History of peptic ulcer Z87.11     SURGICAL HISTORY       Past Surgical History:   Procedure Laterality Date    ABDOMINAL HERNIA REPAIR  2014    wound vac    ABDOMINAL HERNIA REPAIR  11/3/14    BARIATRIC SURGERY  2013    [address]    CHOLECYSTECTOMY      DILATION AND CURETTAGE OF UTERUS  2005    ENDOSCOPY, COLON, DIAGNOSTIC      EGD    FINGER AMPUTATION Left 02/09/2015    index and ring finger.  from [address] [place] [address]      total of 8    IVC FILTER INSERTION      LIVER RESECTION      for hepatic adenoma    LYMPH NODE BIOPSY  1990    JUSTINE AND BSO  2011    TONSILLECTOMY       CURRENT MEDICATIONS       Discharge Medication List as of 8/7/2020  5:17 PM      CONTINUE these medications which have NOT CHANGED    Details   clindamycin (CLEOCIN) 300 MG capsule Take 1 capsule by mouth 4 times daily for 10 days, Disp-40 capsule,R-0Normal      HYDROcodone-acetaminophen (NORCO) 5-325 MG per tablet Take 1 tablet by mouth every 8 hours as needed for Pain for up to 3 days. Take lowest dose possible to manage pain, Disp-9 tablet,R-0Normal      sucralfate (CARAFATE) 1 GM tablet DISSOLVE 1 TABLET INTO 15 ML( 1 TABLESPOON) OF WATER AND DRINK BY MOUTH FOUR TIMES DAILY AS NEEDED FOR GERD, Disp-360 tablet,R-5Normal      dilTIAZem (DILTIAZEM CD) 240 MG extended release capsule Take 240 mg by mouth daily Take one capsule by mouth daily. Historical Med      cyanocobalamin 1000 MCG/ML injection ADMINISTER 1 ML IN THE MUSCLE EVERY 7 DAYS, Disp-4 mL, R-3Normal      pravastatin (PRAVACHOL) 40 MG tablet TAKE 1 TABLET(40 MG) BY MOUTH DAILY, Disp-90 tablet, R-3Normal      tiZANidine (ZANAFLEX) 4 MG tablet Take 1 tablet by mouth 2 times daily as needed (back pain), Disp-180 tablet, R-0Normal      ondansetron (ZOFRAN-ODT) 4 MG disintegrating tablet DISSOLVE ONE TABLET BY MOUTH THREE TIMES DAILY AS NEEDED FOR NAUSEA AND VOMITING, Disp-21 tablet, R-0Normal      valACYclovir (VALTREX) 500 MG tablet Take 1 tablet by mouth daily, Disp-90 tablet, R-3Normal      benazepril (LOTENSIN) 20 MG tablet Take 1 tablet by mouth daily, Disp-90 tablet, R-3Normal      ondansetron (ZOFRAN) 4 MG tablet Take 1 tablet by mouth 3 times daily as needed for Nausea or Vomiting, Disp-15 tablet, R-0Print      famotidine (PEPCID) 20 MG tablet Take 1 tablet by mouth 2 times daily, Disp-60 tablet, R-11Normal      acetaminophen (TYLENOL) 500 MG tablet Take 2 tablets by mouth every 8 hours as needed for Pain, Disp-20 tablet, R-0Print      LORazepam (ATIVAN) 2 MG tablet Historical Med      GLUCAGEN HYPOKIT 1 MG SOLR injection DAWHistorical Med      Insulin Glargine, 1 Unit Dial, (TOUJEO SOLOSTAR) 300 UNIT/ML SOPN Inject 70 Units into the skin every morning, Disp-8 pen, R-5Normal      glucose monitoring kit (FREESTYLE) monitoring kit Disp-1 kit, R-0, NormalTesting twice a day. Please dispense according to patients insurance. Insulin Pen Needle 31G X 5 MM MISC DAILY Starting Fri 12/20/2019, Disp-100 each, R-5, Normal      Lancets 30G MISC Disp-200 each, R-3, NormalTesting twice a day. Please dispense according to patients insurance. blood glucose monitor strips Testing twice a day. Please dispense according to patients insurance., Disp-200 strip, R-3, Normal      fondaparinux (ARIXTRA) 10 MG/0.8ML SOLN injection Inject 0.8 mLs into the skin daily, Disp-24 mL, R-11Normal      KLOR-CON M10 10 MEQ extended release tablet Take 2 tablets by mouth daily as needed (take with bumex), Disp-60 tablet, R-3, DAWNormal      dapagliflozin (FARXIGA) 10 MG tablet Take 1 tablet by mouth every morning, Disp-90 tablet, R-3Normal      Syringe/Needle, Disp, (SYRINGE 3CC/25GX1\") 25G X 1\" 3 ML MISC Disp-50 each, R-3, NormalTo be used with B12 injections monthly      bumetanide (BUMEX) 0.5 MG tablet TAKE 1 TABLET BY MOUTH DAILY AS NEEDED FOR LEG SWELLING, Disp-90 tablet, R-0**Patient requests 90 days supply**Normal      lamoTRIgine (LAMICTAL) 200 MG tablet Take 200 mg by mouth dailyHistorical Med      amphetamine-dextroamphetamine (ADDERALL, 30MG,) 30 MG tablet Take 30 mg by mouth daily. Historical Med      albuterol sulfate  (90 Base) MCG/ACT inhaler Inhale 2 puffs into the lungs 4 times daily as needed for Wheezing, Disp-3 Inhaler, R-1Normal      dicyclomine (BENTYL) 10 MG capsule Take 1 capsule by mouth every 6 hours as needed (cramps), Disp-20 capsule, R-0Print      QUEtiapine (SEROQUEL) 100 MG tablet Take 50 mg by mouth nightly as needed Historical Med      Multiple Vitamins-Minerals (THERAPEUTIC MULTIVITAMIN-MINERALS) tablet Take 1 tablet by mouth dailyHistorical Med      buPROPion (WELLBUTRIN XL) 300 MG extended release tablet Take 300 mg by mouth every morningHistorical Med      vitamin D (CHOLECALCIFEROL) 1000 UNIT TABS tablet Take 10,000 Units by mouth daily 5 days a weekHistorical Med           ALLERGIES     is allergic to asa [aspirin]; betadine [povidone iodine]; celexa [citalopram hydrobromide]; citalopram; lasix [furosemide]; macrobid [nitrofurantoin]; norco [hydrocodone-acetaminophen]; betadine [povidone iodine]; codeine; lithium; paroxetine; paxil [paroxetine hcl]; tape [adhesive tape]; and tegretol [carbamazepine]. FAMILY HISTORY     She indicated that her mother is alive. She indicated that her father is alive. She indicated that the status of her maternal aunt is unknown. She indicated that the status of her maternal uncle is unknown. She indicated that the status of her maternal cousin is unknown. SOCIAL HISTORY       Social History     Tobacco Use    Smoking status: Never Smoker    Smokeless tobacco: Never Used   Substance Use Topics    Alcohol use: No     Alcohol/week: 0.0 standard drinks     Comment: Last alcohol use was in 12/2015    Drug use: No     Comment: Pt stole her mom's Benzo 1 yr ago. Pt said she took more than prescribed dose of Valium once in late 90's. PHYSICAL EXAM     INITIAL VITALS: /72   Pulse 100   Temp 97.7 °F (36.5 °C) (Oral)   Resp 20   Ht 5' 8.5\" (1.74 m)   Wt 280 lb (127 kg)   SpO2 98%   BMI 41.95 kg/m²    Physical Exam  Vitals signs and nursing note reviewed. Constitutional:       General: She is not in acute distress. Appearance: Normal appearance. She is obese. She is not toxic-appearing. HENT:      Head: Normocephalic and atraumatic. Nose: Nose normal.      Mouth/Throat:      Mouth: Mucous membranes are moist.      Pharynx: Oropharynx is clear. Comments: No evidence of pulpitis. Dental caries throughout with multiple missing teeth. No evidence of abscess, floor of mouth soft. No tongue elevation or uvula deviation. Airway patent. Eyes:      Extraocular Movements: Extraocular movements intact. Conjunctiva/sclera: Conjunctivae normal.   Neck:      Musculoskeletal: Normal range of motion. Cardiovascular:      Rate and Rhythm: Normal rate and regular rhythm. Pulses: Normal pulses. Heart sounds: Normal heart sounds. Pulmonary:      Effort: Pulmonary effort is normal.      Breath sounds: Normal breath sounds. Abdominal:      General: Bowel sounds are normal. There is no distension. Palpations: Abdomen is soft. Tenderness: There is no abdominal tenderness. Musculoskeletal: Normal range of motion. Skin:     General: Skin is warm and dry. Capillary Refill: Capillary refill takes less than 2 seconds. Neurological:      General: No focal deficit present. Mental Status: She is alert. Psychiatric:         Mood and Affect: Mood normal.         MEDICAL DECISION MAKIN patient states pain relieved after dose of morphine, patient states comfortable going home  Will continue to try and follow-up with dentist at this time. Patient currently on clindamycin per primary we will continue. Pt to f/u outpatient with PCP and dentist in 1-3 days. Dental resources given. Pt to return to ED if worsening signs or symptoms as discussed. VS stable for dc. CRITICAL CARE:       PROCEDURES:    Procedures    DIAGNOSTIC RESULTS   EKG:All EKG's are interpreted by the Emergency Department Physician who either signs or Co-signs this chart in the absence of a cardiologist.        RADIOLOGY:All plain film, CT, MRI, and formal ultrasound images (except ED bedside ultrasound) are read by the radiologist, see reports below, unless otherwisenoted in MDM or here. No orders to display     LABS: All lab results were reviewed by myself, and all abnormals are listed below.   Labs Reviewed - No data to display    EMERGENCY DEPARTMENTCOURSE:         Vitals:    Vitals:    20 1532   BP: 123/72   Pulse: 100   Resp: 20   Temp: 97.7 °F (36.5 °C)   TempSrc: Oral   SpO2: 98%   Weight: 280 lb (127 kg)   Height: 5' 8.5\" (1.74 m)       The patient was given the following medications while in the emergency department:  Orders Placed This Encounter   Medications    morphine sulfate (PF) injection 4 mg     CONSULTS:  None    FINAL IMPRESSION      1. Pain, dental    2.  Dental caries          DISPOSITION/PLAN   DISPOSITION Decision To Discharge 08/07/2020 05:12:06 PM      PATIENT REFERRED TO:  Malik Dial MD  [address] [place] [address].  [facility]  [facility]    Call in 2 days      [facility]  [facility] [address]  [facility]  [phone]    As needed, If symptoms worsen    Dentist    In 1 day      DISCHARGE MEDICATIONS:  Discharge Medication List as of 8/7/2020  5:17 PM        Alvaro De Leon DO  Attending Emergency Physician                   Alvaro De Leon DO  08/08/20 [id number]

## 2020-08-08 ASSESSMENT — ENCOUNTER SYMPTOMS
ABDOMINAL PAIN: 0
EYE REDNESS: 0
PHOTOPHOBIA: 0
SORE THROAT: 0
SHORTNESS OF BREATH: 0
EYE PAIN: 0

## 2020-08-10 ENCOUNTER — TELEPHONE (OUTPATIENT)
Dept: FAMILY MEDICINE CLINIC | Age: 40
End: 2020-08-10

## 2020-08-10 PROBLEM — M79.7 FIBROMYALGIA: Status: ACTIVE | Noted: 2020-08-10

## 2020-08-10 PROBLEM — G62.9 POLYNEUROPATHY: Status: ACTIVE | Noted: 2020-08-10

## 2020-08-25 RX ORDER — ALBUTEROL SULFATE 90 UG/1
AEROSOL, METERED RESPIRATORY (INHALATION)
Qty: 54 G | OUTPATIENT
Start: 2020-08-25

## 2020-08-25 NOTE — TELEPHONE ENCOUNTER
Last seen 8/5/20    Next Visit Date:  Future Appointments   Date Time Provider Maria Luisa Walker   11/27/2020  8:00 AM Mehul Correia MD fp sc [address] Maintenance   Topic Date Due    Diabetic foot exam  06/13/1989    Diabetic microalbuminuria test  06/13/1997    Diabetic retinal exam  12/01/2015    Annual Wellness Visit (AWV)  06/19/2019    Hepatitis B vaccine (2 of 3 - Risk 3-dose series) 02/07/2020    A1C test (Diabetic or Prediabetic)  03/20/2020    Flu vaccine (1) 09/01/2020    Lipid screen  01/07/2021    Potassium monitoring  04/26/2021    Creatinine monitoring  04/26/2021    DTaP/Tdap/Td vaccine (2 - Td) 07/15/2026    Pneumococcal 0-64 years Vaccine  Completed    HIV screen  Completed    Hepatitis A vaccine  Aged Out    Hib vaccine  Aged Out    Meningococcal (ACWY) vaccine  Aged Out       Hemoglobin A1C (%)   Date Value   12/20/2019 9.1   11/20/2018 9.6 (H)   01/30/2018 10.2             ( goal A1C is < 7)   No results found for: LABMICR  LDL Cholesterol (mg/dL)   Date Value   04/08/2017 73     LDL Calculated (mg/dL)   Date Value   01/07/2020 79       (goal LDL is <100)   AST (U/L)   Date Value   04/26/2020 10     ALT (U/L)   Date Value   04/26/2020 9     BUN (mg/dL)   Date Value   04/26/2020 12     BP Readings from Last 3 Encounters:   08/07/20 123/72   07/11/20 (!) 144/96   05/01/20 119/74          (goal 120/80)    All Future Testing planned in [facility]  Lab Frequency Next Occurrence   Comprehensive Metabolic Panel, Fasting Once 12/05/2020   Lipid, Fasting Once 12/05/2020   Urinalysis Reflex to Culture Once 12/05/2020   Vitamin B12 Once 12/05/2020   Microalbumin, Ur Once 12/05/2020   Urine Drug Screen Once 12/05/2020   US Liver Once 12/19/2020   CBC Auto Differential Once 12/05/2020   Comprehensive Metabolic Panel Once 18/86/6505   Hemoglobin A1C Once 12/05/2020               Patient Active Problem List:     Pernicious anemia     History of ulcer disease     History of DVT of lower extremity     Primary hypercoagulable state ([facility]     Amputation finger     GERD (gastroesophageal reflux disease)     Alcohol dependence in remission ([facility]     Type 2 diabetes mellitus with diabetic polyneuropathy, with long-term current use of insulin (HCC)     Primary insomnia     Essential hypertension     Bipolar affective disorder in remission ([facility]     History of pulmonary embolism     Antiphospholipid syndrome (HCC)     Hyperlipidemia with target LDL less than 100     Chronic post-traumatic stress disorder (PTSD)     OCD (obsessive compulsive disorder)     Severe benzodiazepine use disorder ([facility]     Morbid obesity with BMI of 50.0-59.9, adult ([facility]     History of MRSA infection     Pain of upper abdomen     Painting esophagus     NAFLD (nonalcoholic fatty liver disease)     Nondependent opioid abuse in remission ([facility]     Osteopenia     History of Awa-en-Y gastric bypass     Herpes genitalis     History of drug abuse ([facility]     Malabsorption     History of pulmonary embolus (PE)     Vitamin B 12 deficiency     Vitamin D deficiency     History of surgery of liver     Blood alkaline phosphatase increased compared with prior measurement     Paroxysmal SVT (supraventricular tachycardia) (HCC)     Anxiety     Hypomagnesemia     Chronic idiopathic constipation     Recurrent incisional hernia     History of small bowel obstruction     History of peptic ulcer     Fibromyalgia     Polyneuropathy

## 2020-09-21 RX ORDER — TIZANIDINE 4 MG/1
TABLET ORAL
Qty: 180 TABLET | Refills: 0 | Status: SHIPPED | OUTPATIENT
Start: 2020-09-21 | End: 2020-12-17

## 2020-09-21 NOTE — TELEPHONE ENCOUNTER
Last seen 8/5/20    Next Visit Date:  Future Appointments   Date Time Provider Maria Luisa Clarki   11/27/2020  8:00 AM Sandra Higginbotham MD fp sc [address] Maintenance   Topic Date Due    Diabetic foot exam  06/13/1989    Diabetic microalbuminuria test  06/13/1997    Diabetic retinal exam  12/01/2015    Annual Wellness Visit (AWV)  06/19/2019    Hepatitis B vaccine (2 of 3 - Risk 3-dose series) 02/07/2020    A1C test (Diabetic or Prediabetic)  03/20/2020    Flu vaccine (1) 09/01/2020    Lipid screen  01/07/2021    Potassium monitoring  04/26/2021    Creatinine monitoring  04/26/2021    DTaP/Tdap/Td vaccine (2 - Td) 07/15/2026    Pneumococcal 0-64 years Vaccine  Completed    HIV screen  Completed    Hepatitis A vaccine  Aged Out    Hib vaccine  Aged Out    Meningococcal (ACWY) vaccine  Aged Out       Hemoglobin A1C (%)   Date Value   12/20/2019 9.1   11/20/2018 9.6 (H)   01/30/2018 10.2             ( goal A1C is < 7)   No results found for: LABMICR  LDL Cholesterol (mg/dL)   Date Value   04/08/2017 73     LDL Calculated (mg/dL)   Date Value   01/07/2020 79       (goal LDL is <100)   AST (U/L)   Date Value   04/26/2020 10     ALT (U/L)   Date Value   04/26/2020 9     BUN (mg/dL)   Date Value   04/26/2020 12     BP Readings from Last 3 Encounters:   08/07/20 123/72   07/11/20 (!) 144/96   05/01/20 119/74          (goal 120/80)    All Future Testing planned in [facility]  Lab Frequency Next Occurrence   Comprehensive Metabolic Panel, Fasting Once 12/05/2020   Lipid, Fasting Once 12/05/2020   Urinalysis Reflex to Culture Once 12/05/2020   Vitamin B12 Once 12/05/2020   Microalbumin, Ur Once 12/05/2020   Urine Drug Screen Once 12/05/2020   US Liver Once 12/19/2020   CBC Auto Differential Once 12/05/2020   Comprehensive Metabolic Panel Once 07/61/7984   Hemoglobin A1C Once 12/05/2020               Patient Active Problem List:     Pernicious anemia     History of ulcer disease     History of DVT of lower extremity     Primary hypercoagulable state ([facility]     Amputation finger     GERD (gastroesophageal reflux disease)     Alcohol dependence in remission ([facility]     Type 2 diabetes mellitus with diabetic polyneuropathy, with long-term current use of insulin (HCC)     Primary insomnia     Essential hypertension     Bipolar affective disorder in remission ([facility]     History of pulmonary embolism     Antiphospholipid syndrome (HCC)     Hyperlipidemia with target LDL less than 100     Chronic post-traumatic stress disorder (PTSD)     OCD (obsessive compulsive disorder)     Severe benzodiazepine use disorder ([facility]     Morbid obesity with BMI of 50.0-59.9, adult ([facility]     History of MRSA infection     Pain of upper abdomen     Painting esophagus     NAFLD (nonalcoholic fatty liver disease)     Nondependent opioid abuse in remission ([facility]     Osteopenia     History of Awa-en-Y gastric bypass     Herpes genitalis     History of drug abuse ([facility]     Malabsorption     History of pulmonary embolus (PE)     Vitamin B 12 deficiency     Vitamin D deficiency     History of surgery of liver     Blood alkaline phosphatase increased compared with prior measurement     Paroxysmal SVT (supraventricular tachycardia) (HCC)     Anxiety     Hypomagnesemia     Chronic idiopathic constipation     Recurrent incisional hernia     History of small bowel obstruction     History of peptic ulcer     Fibromyalgia     Polyneuropathy

## 2020-09-28 RX ORDER — INSULIN GLARGINE 300 U/ML
INJECTION, SOLUTION SUBCUTANEOUS
Qty: 12 ML | Refills: 3 | Status: SHIPPED | OUTPATIENT
Start: 2020-09-28 | End: 2021-03-30

## 2020-09-28 RX ORDER — ACYCLOVIR 50 MG/G
OINTMENT TOPICAL
Qty: 15 G | Refills: 0 | Status: SHIPPED | OUTPATIENT
Start: 2020-09-28 | End: 2020-11-02

## 2020-09-28 NOTE — TELEPHONE ENCOUNTER
Last seen 8/5/20    Next Visit Date:  Future Appointments   Date Time Provider Maria Luisa Walker   11/27/2020  8:00 AM Sally Rene MD fp sc [address] Maintenance   Topic Date Due    Diabetic foot exam  06/13/1989    Diabetic microalbuminuria test  06/13/1997    Diabetic retinal exam  12/01/2015    Annual Wellness Visit (AWV)  06/19/2019    Hepatitis B vaccine (2 of 3 - Risk 3-dose series) 02/07/2020    A1C test (Diabetic or Prediabetic)  03/20/2020    Flu vaccine (1) 09/01/2020    Lipid screen  01/07/2021    Potassium monitoring  04/26/2021    Creatinine monitoring  04/26/2021    Statin Therapy  06/26/2021    DTaP/Tdap/Td vaccine (2 - Td) 07/15/2026    Pneumococcal 0-64 years Vaccine  Completed    HIV screen  Completed    Hepatitis A vaccine  Aged Out    Hib vaccine  Aged Out    Meningococcal (ACWY) vaccine  Aged Out       Hemoglobin A1C (%)   Date Value   12/20/2019 9.1   11/20/2018 9.6 (H)   01/30/2018 10.2             ( goal A1C is < 7)   No results found for: LABMICR  LDL Cholesterol (mg/dL)   Date Value   04/08/2017 73     LDL Calculated (mg/dL)   Date Value   01/07/2020 79       (goal LDL is <100)   AST (U/L)   Date Value   04/26/2020 10     ALT (U/L)   Date Value   04/26/2020 9     BUN (mg/dL)   Date Value   04/26/2020 12     BP Readings from Last 3 Encounters:   08/07/20 123/72   07/11/20 (!) 144/96   05/01/20 119/74          (goal 120/80)    All Future Testing planned in [facility]  Lab Frequency Next Occurrence   Comprehensive Metabolic Panel, Fasting Once 12/05/2020   Lipid, Fasting Once 12/05/2020   Urinalysis Reflex to Culture Once 12/05/2020   Vitamin B12 Once 12/05/2020   Microalbumin, Ur Once 12/05/2020   Urine Drug Screen Once 12/05/2020   US Liver Once 12/19/2020   CBC Auto Differential Once 12/05/2020   Comprehensive Metabolic Panel Once 28/63/4377   Hemoglobin A1C Once 12/05/2020               Patient Active Problem List:     Pernicious anemia     History of ulcer disease     History of DVT of lower extremity     Primary hypercoagulable state ([facility]     Amputation finger     GERD (gastroesophageal reflux disease)     Alcohol dependence in remission ([facility]     Type 2 diabetes mellitus with diabetic polyneuropathy, with long-term current use of insulin ([facility])     Primary insomnia     Essential hypertension     Bipolar affective disorder in remission ([facility]     History of pulmonary embolism     Antiphospholipid syndrome (HCC)     Hyperlipidemia with target LDL less than 100     Chronic post-traumatic stress disorder (PTSD)     OCD (obsessive compulsive disorder)     Severe benzodiazepine use disorder ([facility]     Morbid obesity with BMI of 50.0-59.9, adult ([facility]     History of MRSA infection     Pain of upper abdomen     Painting esophagus     NAFLD (nonalcoholic fatty liver disease)     Nondependent opioid abuse in remission ([facility]     Osteopenia     History of Awa-en-Y gastric bypass     Herpes genitalis     History of drug abuse ([facility]     Malabsorption     History of pulmonary embolus (PE)     Vitamin B 12 deficiency     Vitamin D deficiency     History of surgery of liver     Blood alkaline phosphatase increased compared with prior measurement     Paroxysmal SVT (supraventricular tachycardia) ([facility])     Anxiety     Hypomagnesemia     Chronic idiopathic constipation     Recurrent incisional hernia     History of small bowel obstruction     History of peptic ulcer     Fibromyalgia     Polyneuropathy

## 2020-09-30 ENCOUNTER — TELEMEDICINE (OUTPATIENT)
Dept: FAMILY MEDICINE CLINIC | Age: 40
End: 2020-09-30
Payer: MEDICARE

## 2020-09-30 PROCEDURE — G8427 DOCREV CUR MEDS BY ELIG CLIN: HCPCS | Performed by: FAMILY MEDICINE

## 2020-09-30 PROCEDURE — 99213 OFFICE O/P EST LOW 20 MIN: CPT | Performed by: FAMILY MEDICINE

## 2020-09-30 RX ORDER — CEPHALEXIN 500 MG/1
500 CAPSULE ORAL 2 TIMES DAILY
Qty: 20 CAPSULE | Refills: 0 | Status: SHIPPED | OUTPATIENT
Start: 2020-09-30 | End: 2020-10-06 | Stop reason: ALTCHOICE

## 2020-09-30 RX ORDER — ACETAMINOPHEN AND CODEINE PHOSPHATE 300; 30 MG/1; MG/1
1 TABLET ORAL EVERY 8 HOURS PRN
Qty: 9 TABLET | Refills: 0 | Status: SHIPPED | OUTPATIENT
Start: 2020-09-30 | End: 2020-10-06 | Stop reason: ALTCHOICE

## 2020-09-30 ASSESSMENT — ENCOUNTER SYMPTOMS
RHINORRHEA: 0
FACIAL SWELLING: 0
SHORTNESS OF BREATH: 0
CONSTIPATION: 0
ABDOMINAL DISTENTION: 0
COUGH: 0
WHEEZING: 0

## 2020-09-30 NOTE — PROGRESS NOTES
[address] [address]  Phone: [phone], Fax: [phone]    TELEHEALTH EVALUATION -- Audio/Visual (During [id number] public health emergency)    Patient ID verified by me prior to start of this visit    Aj Bull (:  1980) has requested an audio/video evaluation for the following concern(s):  Chief Complaint   Patient presents with    Dental Pain      HPI:  Aj Bull is an established patient of Myna Frankel, MD  . Patient has a history of dental infection, reports she had 13 teeth extracted on August 15. Now she has remaining teeth extraction on . Patient reports she has pain and infection, she gets recurrent infections of teeth. She was given antibiotic in the past that helps. She takes Tylenol for pain without any relief. Patient has front teeth which is swollen, red denies any discharge. Patient denies any fever chills. Review of Systems   Constitutional: Negative for activity change, appetite change, diaphoresis, fatigue and fever. HENT: Positive for dental problem and mouth sores. Negative for congestion, drooling, ear pain, facial swelling, hearing loss, nosebleeds, postnasal drip and rhinorrhea. Eyes: Negative for visual disturbance. Respiratory: Negative for cough, shortness of breath and wheezing. Cardiovascular: Negative for chest pain, palpitations and leg swelling. Gastrointestinal: Negative for abdominal distention and constipation.        Patient Active Problem List    Diagnosis Date Noted    Alcohol dependence in remission ([facility] 2015     Priority: Medium    Fibromyalgia 08/10/2020    Polyneuropathy 08/10/2020    Recurrent incisional hernia 2020    History of small bowel obstruction 2020    History of peptic ulcer 2020    Chronic idiopathic constipation 2020    Malabsorption 2019    History of pulmonary embolus (PE) 2019    Vitamin B 12 deficiency 12/21/2019    Vitamin D deficiency 12/21/2019    History of surgery of liver 12/21/2019    Blood alkaline phosphatase increased compared with prior measurement 12/21/2019    Paroxysmal SVT (supraventricular tachycardia) ([address] 12/21/2019    Herpes genitalis 12/05/2019    History of drug abuse ([address] 12/05/2019    Pain of upper abdomen 02/23/2019    Painting esophagus 02/23/2019    History of Awa-en-Y gastric bypass 02/23/2019    History of MRSA infection 12/17/2018    Morbid obesity with BMI of 50.0-59.9, adult ([address]     Anxiety 02/14/2018    Hypomagnesemia 02/14/2018    Osteopenia 10/12/2017    Severe benzodiazepine use disorder (HCC)     Chronic post-traumatic stress disorder (PTSD)     OCD (obsessive compulsive disorder)     History of pulmonary embolism     Antiphospholipid syndrome ([address]     Primary insomnia 10/07/2015    Essential hypertension 10/07/2015    Bipolar affective disorder in remission ([address] 10/07/2015    Hyperlipidemia with target LDL less than 100 09/27/2015    Type 2 diabetes mellitus with diabetic polyneuropathy, with long-term current use of insulin ([address] 07/04/2015    GERD (gastroesophageal reflux disease) 06/19/2015    Amputation finger 02/24/2015    History of ulcer disease 12/16/2014    History of DVT of lower extremity 12/16/2014    Primary hypercoagulable state ([address] 12/16/2014    Pernicious anemia     Nondependent opioid abuse in remission ([address] 08/16/2013    NAFLD (nonalcoholic fatty liver disease) 02/01/2013        Past Surgical History:   Procedure Laterality Date    ABDOMINAL HERNIA REPAIR  2014    wound vac    ABDOMINAL HERNIA REPAIR  11/3/14    BARIATRIC SURGERY  2013    [facility]    CHOLECYSTECTOMY      DILATION AND CURETTAGE OF UTERUS  2005    ENDOSCOPY, COLON, DIAGNOSTIC      EGD    FINGER AMPUTATION Left 02/09/2015    index and ring finger.  from frostbite    HERNIA REPAIR      total of 8    IVC FILTER INSERTION      LIVER RESECTION      for hepatic adenoma    LYMPH NODE BIOPSY  1990    JUSTINE AND BSO  2011    TONSILLECTOMY       Family History   Problem Relation Age of Onset    Depression Mother     High Blood Pressure Mother     High Cholesterol Mother     Diabetes Father     Heart Disease Father     Kidney Disease Father     High Blood Pressure Father     High Cholesterol Father     Cancer Maternal Uncle         of duodenum had whipple    Breast Cancer Maternal Aunt     Breast Cancer Maternal Cousin      Current Outpatient Medications   Medication Sig Dispense Refill    cephALEXin (KEFLEX) 500 MG capsule Take 1 capsule by mouth 2 times daily for 10 days 20 capsule 0    acetaminophen-codeine (TYLENOL/CODEINE #3) 300-30 MG per tablet Take 1 tablet by mouth every 8 hours as needed for Pain for up to 7 days. Intended supply: 7 days.  Take lowest dose possible to manage pain 9 tablet 0    acyclovir (ZOVIRAX) 5 % ointment APPLY EXTERNALLY TO THE AFFECTED AREA FIVE TIMES DAILY FOR 10 DAYS 15 g 0    TOUJEO SOLOSTAR 300 UNIT/ML SOPN INJECT 70 UNITS INTO THE SKIN EVERY MORNING 12 mL 3    tiZANidine (ZANAFLEX) 4 MG tablet TAKE 1 TABLET BY MOUTH TWICE DAILY AS NEEDED FOR BACK PAIN 180 tablet 0    sucralfate (CARAFATE) 1 GM tablet DISSOLVE 1 TABLET INTO 15 ML( 1 TABLESPOON) OF WATER AND DRINK BY MOUTH FOUR TIMES DAILY AS NEEDED FOR GERD 360 tablet 5    cyanocobalamin 1000 MCG/ML injection ADMINISTER 1 ML IN THE MUSCLE EVERY 7 DAYS 4 mL 3    pravastatin (PRAVACHOL) 40 MG tablet TAKE 1 TABLET(40 MG) BY MOUTH DAILY 90 tablet 3    valACYclovir (VALTREX) 500 MG tablet Take 1 tablet by mouth daily 90 tablet 3    benazepril (LOTENSIN) 20 MG tablet Take 1 tablet by mouth daily 90 tablet 3    ondansetron (ZOFRAN) 4 MG tablet Take 1 tablet by mouth 3 times daily as needed for Nausea or Vomiting 15 tablet 0    famotidine (PEPCID) 20 MG tablet Take 1 tablet by mouth 2 times daily 60 tablet 11    acetaminophen (TYLENOL) 500 MG tablet Take 2 tablets by mouth every 8 hours as needed for Pain 20 tablet 0    LORazepam (ATIVAN) 2 MG tablet       GLUCAGEN HYPOKIT 1 MG SOLR injection       glucose monitoring kit (FREESTYLE) monitoring kit Testing twice a day. Please dispense according to patients insurance. 1 kit 0    Insulin Pen Needle 31G X 5 MM MISC 1 each by Does not apply route daily 100 each 5    Lancets 30G MISC Testing twice a day. Please dispense according to patients insurance. 200 each 3    blood glucose monitor strips Testing twice a day. Please dispense according to patients insurance. 200 strip 3    fondaparinux (ARIXTRA) 10 MG/0.8ML SOLN injection Inject 0.8 mLs into the skin daily 24 mL 11    KLOR-CON M10 10 MEQ extended release tablet Take 2 tablets by mouth daily as needed (take with bumex) 60 tablet 3    dapagliflozin (FARXIGA) 10 MG tablet Take 1 tablet by mouth every morning 90 tablet 3    Syringe/Needle, Disp, (SYRINGE 3CC/25GX1\") 25G X 1\" 3 ML MISC To be used with B12 injections monthly 50 each 3    bumetanide (BUMEX) 0.5 MG tablet TAKE 1 TABLET BY MOUTH DAILY AS NEEDED FOR LEG SWELLING 90 tablet 0    lamoTRIgine (LAMICTAL) 200 MG tablet Take 200 mg by mouth daily      amphetamine-dextroamphetamine (ADDERALL, 30MG,) 30 MG tablet Take 30 mg by mouth daily.  QUEtiapine (SEROQUEL) 100 MG tablet Take 50 mg by mouth nightly as needed       Multiple Vitamins-Minerals (THERAPEUTIC MULTIVITAMIN-MINERALS) tablet Take 1 tablet by mouth daily      buPROPion (WELLBUTRIN XL) 300 MG extended release tablet Take 300 mg by mouth every morning      vitamin D (CHOLECALCIFEROL) 1000 UNIT TABS tablet Take 10,000 Units by mouth daily 5 days a week      dilTIAZem (DILTIAZEM CD) 240 MG extended release capsule Take 240 mg by mouth daily Take one capsule by mouth daily.       ondansetron (ZOFRAN-ODT) 4 MG disintegrating tablet DISSOLVE ONE TABLET BY MOUTH THREE TIMES DAILY AS NEEDED FOR NAUSEA AND VOMITING (Patient not taking: Reported on 9/29/2020) 21 tablet 0    cyanocobalamin 1000 MCG/ML injection Inject 1 mL into the muscle once for 1 dose (Patient not taking: Reported on 9/29/2020) 1 mL 11    dicyclomine (BENTYL) 10 MG capsule Take 1 capsule by mouth every 6 hours as needed (cramps) (Patient not taking: Reported on 9/29/2020) 20 capsule 0     No current facility-administered medications for this visit. Allergies   Allergen Reactions    Asa [Aspirin]      Patient is on chronic anticoagulation    Betadine [Povidone Iodine]     Celexa [Citalopram Hydrobromide]     Citalopram     Lasix [Furosemide]      Tolerates Bumex    Macrobid [Nitrofurantoin]     Norco [Hydrocodone-Acetaminophen] Hives    Betadine [Povidone Iodine] Rash    Codeine Rash    Lithium Nausea And Vomiting    Paroxetine Rash    Paxil [Paroxetine Hcl] Rash    Tape [Adhesive Tape] Rash     Paper tape    Tegretol [Carbamazepine] Rash        Social History     Tobacco Use    Smoking status: Never Smoker    Smokeless tobacco: Never Used   Substance Use Topics    Alcohol use: No     Alcohol/week: 0.0 standard drinks     Comment: Last alcohol use was in 12/2015    Drug use: No     Comment: Pt stole her mom's Benzo 1 yr ago. Pt said she took more than prescribed dose of Valium once in late 90's. PHYSICAL EXAMINATION:  Vital Signs: (As obtained by patient/caregiver or practitioner observation)  Patient-Reported Vitals 9/29/2020   Patient-Reported Weight 275   Patient-Reported Height 5'8   Patient-Reported Temperature -        Constitutional: [x] Appears well-developed and well-nourished [x] No apparent distress      [] Abnormal-   Mental status  [x] Alert and awake  [x] Oriented to person/place/time [x]Able to follow commands      Eyes:  EOM    [x]  Normal  [] Abnormal-  Sclera  [x]  Normal  [] Abnormal -         Discharge [x]  None visible  [] Abnormal -    HENT:   [x] Normocephalic, atraumatic.   [] Abnormal   [] Mouth/Throat: Mucous membranes are moist.  Patient has swollen front teeth, mild redness, multiple dental caries. Left side of teeth have been extracted. External Ears [x] Normal  [] Abnormal-     Neck: [x] No visualized mass     Pulmonary/Chest: [x] Respiratory effort normal.  [x] No visualized signs of difficulty breathing or respiratory distress        [] Abnormal     Musculoskeletal:   [x] Normal gait with no signs of ataxia         [x] Normal range of motion of neck        [] Abnormal-     Neurological:        [x] No Facial Asymmetry (Cranial nerve 7 motor function) (limited exam to video visit)          [x] No gaze palsy        [] Abnormal-     Skin:        [x] No significant exanthematous lesions or discoloration noted on facial skin         [] Abnormal-     Psychiatric:       [x] Normal Affect [x] No Hallucinations        [x] Abnormal- Anxious, with pressured speech    Other pertinent observable physical exam findings-   Lab Results   Component Value Date    WBC 7.2 04/26/2020    HGB 12.9 04/26/2020    HCT 39.7 04/26/2020    MCV 87.3 04/26/2020     04/26/2020     Lab Results   Component Value Date     04/26/2020    K 4.3 04/26/2020     04/26/2020    CO2 21 04/26/2020    BUN 12 04/26/2020    CREATININE 0.52 04/26/2020    GLUCOSE 107 04/26/2020    GLUCOSE 403 06/03/2012    CALCIUM 8.7 04/26/2020        Due to this being a TeleHealth encounter, evaluation of the following organ systems is limited: Vitals/Constitutional/EENT/Resp/CV/GI//MS/Neuro/Skin/Heme-Lymph-Imm. ASSESSMENT/PLAN:  1. Dental abscess  Antibiotic and few Tylenol 3 tablets. Oral hygiene. Follow-up with dentist  - cephALEXin (KEFLEX) 500 MG capsule; Take 1 capsule by mouth 2 times daily for 10 days  Dispense: 20 capsule; Refill: 0  - acetaminophen-codeine (TYLENOL/CODEINE #3) 300-30 MG per tablet; Take 1 tablet by mouth every 8 hours as needed for Pain for up to 7 days. Intended supply: 7 days.  Take lowest dose possible substitute for in-person clinic visit. This is a telehealth visit that was performed with the originating site at Patient Location: home and provider Location of [place], [place]. Verbal consent to participate in video visit was obtained. Patient ID verified by me prior to start of this visit  I discussed with the patient the nature of our telehealth visits via interactive/real-time audio/video that:  - I would evaluate the patient and recommend diagnostics and treatments based on my assessment  - Our sessions are not being recorded and that personal health information is protected  - Our team would provide follow up care in person if/when the patient needs it. Pursuant to the emergency declaration under the [facility], [address] [place] [address] authority and the [employer] Act, this Virtual Visit was conducted with patient's (and/or legal guardian's) consent, to reduce the patient's risk of exposure to COVID-19 and provide necessary medical care. The patient (and/or legal guardian) has also been advised to contact this office for worsening conditions or problems, and seek emergency medical treatment and/or call 911 if deemed necessary. This note was completed by using the assistance of a speech-recognition program. However, inadvertent computerized transcription errors may be present. Although every effort was made to ensure accuracy, no guarantees can be provided that every mistake has been identified and corrected by editing.   Electronically signed by Anh Ferrell MD on 9/30/20 at 9:51 AM EDT

## 2020-10-03 ENCOUNTER — APPOINTMENT (OUTPATIENT)
Dept: GENERAL RADIOLOGY | Age: 40
End: 2020-10-03
Payer: MEDICARE

## 2020-10-03 ENCOUNTER — HOSPITAL ENCOUNTER (EMERGENCY)
Age: 40
Discharge: HOME | End: 2020-10-03
Attending: EMERGENCY MEDICINE
Payer: MEDICARE

## 2020-10-03 VITALS
DIASTOLIC BLOOD PRESSURE: 87 MMHG | TEMPERATURE: 98.6 F | BODY MASS INDEX: 40.92 KG/M2 | HEART RATE: 115 BPM | RESPIRATION RATE: 20 BRPM | OXYGEN SATURATION: 98 % | SYSTOLIC BLOOD PRESSURE: 133 MMHG | HEIGHT: 68 IN | WEIGHT: 270 LBS

## 2020-10-03 PROCEDURE — 6370000000 HC RX 637 (ALT 250 FOR IP): Performed by: STUDENT IN AN ORGANIZED HEALTH CARE EDUCATION/TRAINING PROGRAM

## 2020-10-03 PROCEDURE — 73030 X-RAY EXAM OF SHOULDER: CPT

## 2020-10-03 PROCEDURE — 73502 X-RAY EXAM HIP UNI 2-3 VIEWS: CPT

## 2020-10-03 PROCEDURE — 99283 EMERGENCY DEPT VISIT LOW MDM: CPT

## 2020-10-03 RX ORDER — LIDOCAINE 4 G/G
1 PATCH TOPICAL ONCE
Status: DISCONTINUED | OUTPATIENT
Start: 2020-10-03 | End: 2020-10-03 | Stop reason: HOSPADM

## 2020-10-03 RX ORDER — CYCLOBENZAPRINE HCL 10 MG
10 TABLET ORAL ONCE
Status: COMPLETED | OUTPATIENT
Start: 2020-10-03 | End: 2020-10-03

## 2020-10-03 RX ORDER — LIDOCAINE 4 G/G
1 PATCH TOPICAL DAILY
Qty: 5 PATCH | Refills: 0 | Status: SHIPPED | OUTPATIENT
Start: 2020-10-03 | End: 2020-10-08

## 2020-10-03 RX ADMIN — CYCLOBENZAPRINE 10 MG: 10 TABLET, FILM COATED ORAL at 15:39

## 2020-10-03 ASSESSMENT — ENCOUNTER SYMPTOMS
COUGH: 0
EYE REDNESS: 0
SHORTNESS OF BREATH: 0
VOMITING: 0
EYE ITCHING: 0
NAUSEA: 0
SORE THROAT: 0
RHINORRHEA: 0

## 2020-10-03 ASSESSMENT — PAIN DESCRIPTION - ORIENTATION: ORIENTATION: RIGHT

## 2020-10-03 ASSESSMENT — PAIN DESCRIPTION - LOCATION: LOCATION: SHOULDER;HIP;BACK

## 2020-10-03 ASSESSMENT — PAIN DESCRIPTION - FREQUENCY: FREQUENCY: CONTINUOUS

## 2020-10-03 ASSESSMENT — PAIN SCALES - GENERAL: PAINLEVEL_OUTOF10: 8

## 2020-10-03 NOTE — ED NOTES
Dr. Benitez Part in to assess patient  Medicated for pain right shoulder  Pain 7/10     Dileep Solis RN  10/03/20 [id number]
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
## 2020-10-04 ENCOUNTER — HOSPITAL ENCOUNTER (EMERGENCY)
Age: 40
Discharge: HOME | End: 2020-10-04
Attending: EMERGENCY MEDICINE
Payer: MEDICARE

## 2020-10-04 VITALS
BODY MASS INDEX: 56.26 KG/M2 | OXYGEN SATURATION: 98 % | SYSTOLIC BLOOD PRESSURE: 135 MMHG | RESPIRATION RATE: 18 BRPM | DIASTOLIC BLOOD PRESSURE: 82 MMHG | TEMPERATURE: 98.2 F | HEART RATE: 108 BPM | WEIGHT: 293 LBS

## 2020-10-04 PROCEDURE — 6360000002 HC RX W HCPCS: Performed by: EMERGENCY MEDICINE

## 2020-10-04 PROCEDURE — 99285 EMERGENCY DEPT VISIT HI MDM: CPT

## 2020-10-04 PROCEDURE — 6370000000 HC RX 637 (ALT 250 FOR IP): Performed by: EMERGENCY MEDICINE

## 2020-10-04 PROCEDURE — 96372 THER/PROPH/DIAG INJ SC/IM: CPT

## 2020-10-04 RX ORDER — CYCLOBENZAPRINE HCL 10 MG
10 TABLET ORAL ONCE
Status: COMPLETED | OUTPATIENT
Start: 2020-10-04 | End: 2020-10-04

## 2020-10-04 RX ORDER — KETOROLAC TROMETHAMINE 30 MG/ML
30 INJECTION, SOLUTION INTRAMUSCULAR; INTRAVENOUS ONCE
Status: COMPLETED | OUTPATIENT
Start: 2020-10-04 | End: 2020-10-04

## 2020-10-04 RX ORDER — TRAMADOL HYDROCHLORIDE 50 MG/1
50 TABLET ORAL EVERY 4 HOURS PRN
Qty: 7 TABLET | Refills: 0 | Status: SHIPPED | OUTPATIENT
Start: 2020-10-04 | End: 2020-10-04 | Stop reason: ALTCHOICE

## 2020-10-04 RX ORDER — ACETAMINOPHEN 500 MG
1000 TABLET ORAL ONCE
Status: COMPLETED | OUTPATIENT
Start: 2020-10-04 | End: 2020-10-04

## 2020-10-04 RX ADMIN — CYCLOBENZAPRINE 10 MG: 10 TABLET, FILM COATED ORAL at 03:46

## 2020-10-04 RX ADMIN — ACETAMINOPHEN 1000 MG: 500 TABLET, FILM COATED ORAL at 03:46

## 2020-10-04 RX ADMIN — KETOROLAC TROMETHAMINE 30 MG: 30 INJECTION, SOLUTION INTRAMUSCULAR at 03:58

## 2020-10-04 ASSESSMENT — PAIN SCALES - GENERAL
PAINLEVEL_OUTOF10: 9

## 2020-10-04 NOTE — ED NOTES
Pt called Dr. Henry Harper to room to ask for something stronger for pain. Dr. Henry Harper explained and showed patient her OARS score of 750 and informed her that she wouldn't be getting anything stronger then tylenol. Pt requests a \"shot of something\" prior to discharge. Toradol ordered.       Pool Nava RN  10/04/20 6629
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
## 2020-10-04 NOTE — ED NOTES
Pt states \"well my score is going to be high because I go to the dental clinic and there are multiple different doctors that see you there.  What can I do to bring my score down so I can be prescribed pain medications\"     Radha Dennis RN  10/04/20 [id number]
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
## 2020-10-05 ENCOUNTER — PATIENT MESSAGE (OUTPATIENT)
Dept: FAMILY MEDICINE CLINIC | Age: 40
End: 2020-10-05

## 2020-10-05 ENCOUNTER — TELEPHONE (OUTPATIENT)
Dept: FAMILY MEDICINE CLINIC | Age: 40
End: 2020-10-05

## 2020-10-05 NOTE — PROGRESS NOTES
Visit Information    Have you changed or started any medications since your last visit including any over-the-counter medicines, vitamins, or herbal medicines? no   Are you having any side effects from any of your medications? -  no  Have you stopped taking any of your medications? Is so, why? -  no    Have you seen any other physician or provider since your last visit? No  Have you had any other diagnostic tests since your last visit? Yes - Records Obtained  Have you been seen in the emergency room and/or had an admission to a hospital since we last saw you? Yes - Records Obtained  Have you had your routine dental cleaning in the past 6 months? no    Have you activated your [employer] account? If not, what are your barriers?  Yes     Patient Care Team:  Jonel De Leon MD as PCP - General (Family Medicine)  Jonel De Leon MD as PCP - [facility] Provider  Mita Figueroa MD as Consulting Physician (Infectious Diseases)  Lottie Antony DO as Consulting Physician (Hematology and Oncology)  Kehinde Hermosillo MD as Consulting Physician (Psychiatry)  Lucina Landrum MD as Consulting Physician (Gastroenterology)  Vanessa Dye MD as Consulting Physician (Internal Medicine Cardiovascular Disease)    Medical History Review  Past Medical, Family, and Social History reviewed and does contribute to the patient presenting condition    Health Maintenance   Topic Date Due    Diabetic foot exam  06/13/1989    Diabetic microalbuminuria test  06/13/1997    Diabetic retinal exam  12/01/2015    Annual Wellness Visit (AWV)  06/19/2019    Hepatitis B vaccine (2 of 3 - Risk 3-dose series) 02/07/2020    A1C test (Diabetic or Prediabetic)  03/20/2020    Flu vaccine (1) 09/01/2020    Lipid screen  01/07/2021    Potassium monitoring  04/26/2021    Creatinine monitoring  04/26/2021    Statin Therapy  06/26/2021    DTaP/Tdap/Td vaccine (2 - Td) 07/15/2026    Pneumococcal 0-64 years Vaccine  Completed    HIV screen

## 2020-10-06 ENCOUNTER — TELEMEDICINE (OUTPATIENT)
Dept: FAMILY MEDICINE CLINIC | Age: 40
End: 2020-10-06
Payer: MEDICARE

## 2020-10-06 PROCEDURE — G8427 DOCREV CUR MEDS BY ELIG CLIN: HCPCS | Performed by: FAMILY MEDICINE

## 2020-10-06 PROCEDURE — 99213 OFFICE O/P EST LOW 20 MIN: CPT | Performed by: FAMILY MEDICINE

## 2020-10-06 PROCEDURE — 2022F DILAT RTA XM EVC RTNOPTHY: CPT | Performed by: FAMILY MEDICINE

## 2020-10-06 PROCEDURE — 3046F HEMOGLOBIN A1C LEVEL >9.0%: CPT | Performed by: FAMILY MEDICINE

## 2020-10-06 RX ORDER — PREGABALIN 50 MG/1
50 CAPSULE ORAL 3 TIMES DAILY
Qty: 21 CAPSULE | Refills: 0 | Status: ON HOLD | OUTPATIENT
Start: 2020-10-06 | End: 2022-03-19 | Stop reason: HOSPADM

## 2020-10-06 RX ORDER — CLINDAMYCIN HYDROCHLORIDE 300 MG/1
300 CAPSULE ORAL 4 TIMES DAILY
Qty: 40 CAPSULE | Refills: 0 | Status: SHIPPED | OUTPATIENT
Start: 2020-10-06 | End: 2020-10-16

## 2020-10-06 ASSESSMENT — ENCOUNTER SYMPTOMS
ABDOMINAL PAIN: 0
WHEEZING: 0
SHORTNESS OF BREATH: 0
COUGH: 0
FACIAL SWELLING: 1
CHEST TIGHTNESS: 0

## 2020-10-06 NOTE — PATIENT INSTRUCTIONS
Patient Education        Learning About Diabetes Food Guidelines  Your Care Instructions     Meal planning is important to manage diabetes. It helps keep your blood sugar at a target level (which you set with your doctor). You don't have to eat special foods. You can eat what your family eats, including sweets once in a while. But you do have to pay attention to how often you eat and how much you eat of certain foods. You may want to work with a dietitian or a certified diabetes educator (CDE) to help you plan meals and snacks. A dietitian or CDE can also help you lose weight if that is one of your goals. What should you know about eating carbs? Managing the amount of carbohydrate (carbs) you eat is an important part of healthy meals when you have diabetes. Carbohydrate is found in many foods. · Learn which foods have carbs. And learn the amounts of carbs in different foods. ? Bread, cereal, pasta, and rice have about 15 grams of carbs in a serving. A serving is 1 slice of bread (1 ounce), ½ cup of cooked cereal, or 1/3 cup of cooked pasta or rice. ? Fruits have 15 grams of carbs in a serving. A serving is 1 small fresh fruit, such as an apple or orange; ½ of a banana; ½ cup of cooked or canned fruit; ½ cup of fruit juice; 1 cup of melon or raspberries; or 2 tablespoons of dried fruit. ? Milk and no-sugar-added yogurt have 15 grams of carbs in a serving. A serving is 1 cup of milk or 2/3 cup of no-sugar-added yogurt. ? Starchy vegetables have 15 grams of carbs in a serving. A serving is ½ cup of mashed potatoes or sweet potato; 1 cup winter squash; ½ of a small baked potato; ½ cup of cooked beans; or ½ cup cooked corn or green peas. · Learn how much carbs to eat each day and at each meal. A dietitian or CDE can teach you how to keep track of the amount of carbs you eat. This is called carbohydrate counting. · If you are not sure how to count carbohydrate grams, use the Plate Method to plan meals.  It is a symptoms? Work with your doctor to fill in the blank spaces below that apply to you. Low blood sugar  If you have symptoms of low blood sugar, check your blood sugar. If it's below _____ ( for example, below 70), eat or drink a quick-sugar food that has about 15 grams of carbohydrate. Your goal is to get your level back to your safe range. Check your blood sugar again 15 minutes later. If it's still not in your target range, take another 15 grams of carbohydrate and check your blood sugar again in 15 minutes. Repeat this until you reach your target. Then go back to your regular testing schedule. Children usually need less than 15 grams of carbohydrate. Check with your doctor or diabetes educator for the amount that is right for your child. When you have low blood sugar, it's best to stop or reduce any physical activity until your blood sugar is back in your target range and is stable. If you must stay active, eat or drink 30 grams of carbohydrate. Then check your blood sugar again in 15 minutes. If it's not in your target range, take another 30 grams of carbohydrates. Check your blood sugar again in 15 minutes. Keep doing this until you reach your target. You can then go back to your regular testing schedule. If your symptoms or blood sugar levels are getting worse or have not improved after 15 minutes, seek medical care right away. Here are some examples of quick-sugar foods with 15 grams of carbohydrate:  · 3 or 4 glucose tablets  · 1 tablespoon (3 teaspoons) table sugar  · ½ cup to ¾ cup (4 to 6 ounces) of fruit juice or regular (not diet) soda  · Hard candy (such as 6 Life Savers)  High blood sugar  If you have symptoms of high blood sugar, check your blood sugar. Your goal is to get your level back to your target range. If it's above ______ ( for example, above 250), follow these steps:  · If you missed a dose of your diabetes medicine, take it now.  Take only the amount of medicine that you have been prescribed. Do not take more or less medicine. · Give yourself insulin if your doctor has prescribed it for high blood sugar. · Test for ketones, if the doctor told you to do so. If the results of the ketone test show a moderate-to-large amount of ketones, call the doctor for advice. · Wait 30 minutes after you take the extra insulin or the missed medicine. Check your blood sugar again. If your symptoms or blood sugar levels are getting worse or have not improved after taking these steps, seek medical care right away. Follow-up care is a key part of your treatment and safety. Be sure to make and go to all appointments, and call your doctor if you are having problems. It's also a good idea to know your test results and keep a list of the medicines you take. Where can you learn more? Go to https://cherumeb.gulu.com. org and sign in to your [employer] account. Enter A704 in the [employer] box to learn more about \"Diabetes Blood Sugar Emergencies: Your Action Plan. \"     If you do not have an account, please click on the \"Sign Up Now\" link. Current as of: December 20, 2019               Content Version: 12.5  © 3049-8214 [employer]. Care instructions adapted under license by [facility] ([facility]). If you have questions about a medical condition or this instruction, always ask your healthcare professional. [address]  any warranty or liability for your use of this information.

## 2020-10-06 NOTE — PROGRESS NOTES
10/6/2020    TELEHEALTH EVALUATION -- Audio/Visual (During UAMIE-71 public health emergency)    HPI:    Yael Bhakta (:  1980) has requested an audio/video evaluation for the following concern(s):ED Follow-up (ABDOMINAL PAIN AND DENTAL ABSCESS - [address] AND THEY GAVE HER HIVES )    Patient complains of dental abscess and swelling over the left cheek  She says she was scheduled for 10/14/2020 with oral surgeon   Patient says lump still there and painful  Currently taking Tylenol 3 given by my partner and he gives her itching and she wants something else. She is also taking Keflex twice a day since 2020 but does not feel it is helping    Last time she took clindamycin was in August.   Patient says she is a candidate for dentures and she had already 17 teeth removed, but still has some teeth which need to be removed     She is rinsing with warm salty water and using Tylenol extra strength 325 mg 2 tablets at once but not multiple times throughout the day. Discussed she can take it up to 4 times a day. She is specifically asking for pain meds. I explained to her that I cannot give her any opiates while taking lorazepam and she is not happy with this. She also has history of alcohol dependence, currently in remission. I explained to her that I can offer gabapentin because she also has neuropathy. She says gabapentin did not help her with the neuropathy in the past, so I offered to her Lyrica and she is finally agreeable \"if it will help\". I explained that she can increase the Tylenol to 650 mg every 6 hours and will continue to raise and less change antibiotic  She does not look comfortable and she says she is in pain all the time, Intensity of pain is  8 out of 10 and she cannot eat much.   She cannot take NSAIDs because she is on blood thinners      Patient is on the high dosage of lorazepam 2 mg 3 times a day for her psychiatrist, we will not give opiates due to very high risk of interaction and to follow the law of controlled substance  10/01/2020  1   09/11/2020  Lorazepam 2 MG Tablet  90.00  30 Ze Youngblood Dears   [id number]   Wal ([id number])   0  6.00 LME  [place]   [place]   09/30/2020  1   09/30/2020  Acetaminophen-Cod #3 Tablet  9.00  7 Ra Rejim   [id number]   Wal ([id number])   0  5.79 MME  [place]   09/17/2020  1   09/04/2020  Dextroamp-Amphetamin 10 MG Tab  30.00  30 Ge Alvarado   [id number]   Wal ([id number])   0           Patient was seen in the emergency room on 10/3/2020 at [facility] due to straining the right shoulder and right hip pain  Patient was treated with Flexeril, lidocaine, and x-rays were done, showing just mild degenerative changes in the shoulder    Patient was seen again in the emergency room at [facility], requesting pain medication, she was treated with Flexeril, etodolac, and acetaminophen and discharged home, 1 of the diagnoses was drug-seeking behavior and pain in the right upper extremity     Patient was seen by my partner on 9/30/2020 due to dental abscess and she was prescribed Keflex and Tylenol 3      She has known type 2 diabetes mellitus uncontrolled  Home Blood Glucose 109 this morning, well controlled. She never completed the blood work I have ordered for her in August she promises me she will go tomorrow  She is on [place]  Denies increased appetite, thirst or polyuria. She says her home Blood Glucose has been better due to not eating much due to ongoing dental issues    A1c improving    Lab Results   Component Value Date    LABA1C 9.1 12/20/2019    LABA1C 9.6 (H) 11/20/2018    LABA1C 10.2 01/30/2018                Review of Systems   Constitutional: Positive for fatigue. Negative for activity change, appetite change, chills, diaphoresis and fever. HENT: Positive for dental problem and facial swelling. Eyes: Positive for visual disturbance (chronic, stable).    Respiratory: Negative for cough, chest tightness, shortness of breath and wheezing. Cardiovascular: Negative for chest pain, palpitations and leg swelling. Gastrointestinal: Negative for abdominal pain. Has history of multiple abdominal surgeries   Endocrine: Negative for polydipsia, polyphagia and polyuria. Allergic/Immunologic: Positive for immunocompromised state (due to diabetes). Neurological: Positive for numbness (feet, chronic). Prior to Visit Medications    Medication Sig Taking? Authorizing Provider   lidocaine 4 % external patch Place 1 patch onto the skin daily for 5 days Use lidocaine patches over the area of pain and leave on for 12 hours, then off for 12 hours. Do not use more than 1 lidocaine patch per day. Yes Magdalena Coy,    cephALEXin (KEFLEX) 500 MG capsule Take 1 capsule by mouth 2 times daily for 10 days Yes Doug Parker MD   acetaminophen-codeine (TYLENOL/CODEINE #3) 300-30 MG per tablet Take 1 tablet by mouth every 8 hours as needed for Pain for up to 7 days. Intended supply: 7 days. Take lowest dose possible to manage pain Yes Doug Parker MD   acyclovir (ZOVIRAX) 5 % ointment APPLY EXTERNALLY TO THE AFFECTED AREA FIVE TIMES DAILY FOR 10 DAYS Yes MD MISHA Avilez 300 UNIT/ML SOPN INJECT 70 UNITS INTO THE SKIN EVERY MORNING Yes Leidy Phillip MD   tiZANidine (ZANAFLEX) 4 MG tablet TAKE 1 TABLET BY MOUTH TWICE DAILY AS NEEDED FOR BACK PAIN Yes Leidy Phillip MD   sucralfate (CARAFATE) 1 GM tablet DISSOLVE 1 TABLET INTO 15 ML( 1 TABLESPOON) OF WATER AND DRINK BY MOUTH FOUR TIMES DAILY AS NEEDED FOR GERD Yes Leidy Phillip MD   dilTIAZem (DILTIAZEM CD) 240 MG extended release capsule Take 240 mg by mouth daily Take one capsule by mouth daily.  Yes Historical Provider, MD   cyanocobalamin 1000 MCG/ML injection ADMINISTER 1 ML IN THE MUSCLE EVERY 7 DAYS Yes Leidy Phillip MD   pravastatin (PRAVACHOL) 40 MG tablet TAKE 1 TABLET(40 MG) BY MOUTH DAILY Yes Leidy Phillip MD   ondansetron (ZOFRAN-ODT) 4 MG disintegrating tablet DISSOLVE ONE TABLET BY MOUTH THREE TIMES DAILY AS NEEDED FOR NAUSEA AND VOMITING Yes Sarmad Car MD   valACYclovir (VALTREX) 500 MG tablet Take 1 tablet by mouth daily Yes Sarmad Car MD   benazepril (LOTENSIN) 20 MG tablet Take 1 tablet by mouth daily Yes Sarmad Car MD   ondansetron (ZOFRAN) 4 MG tablet Take 1 tablet by mouth 3 times daily as needed for Nausea or Vomiting Yes Ritu Blank MD   famotidine (PEPCID) 20 MG tablet Take 1 tablet by mouth 2 times daily Yes Sarmad Car MD   acetaminophen (TYLENOL) 500 MG tablet Take 2 tablets by mouth every 8 hours as needed for Pain Yes Ayana Camp,    LORazepam (ATIVAN) 2 MG tablet  Yes Historical Provider, MD   GLUCAGEN HYPOKIT 1 MG SOLR injection  Yes Historical Provider, MD   glucose monitoring kit (FREESTYLE) monitoring kit Testing twice a day. Please dispense according to patients insurance. Yes Sarmad Car MD   Insulin Pen Needle 31G X 5 MM MISC 1 each by Does not apply route daily Yes Sarmad Car MD   Lancets 30G MISC Testing twice a day. Please dispense according to patients insurance. Yes Sarmad Car MD   blood glucose monitor strips Testing twice a day. Please dispense according to patients insurance.  Yes Sarmad Car MD   fondaparinux (ARIXTRA) 10 MG/0.8ML SOLN injection Inject 0.8 mLs into the skin daily Yes Sarmad Car MD   KLOR-CON M10 10 MEQ extended release tablet Take 2 tablets by mouth daily as needed (take with bumex) Yes Sarmad Car MD   dapagliflozin (FARXIGA) 10 MG tablet Take 1 tablet by mouth every morning Yes Sarmad Car MD   Syringe/Needle, Disp, (SYRINGE 3CC/25GX1\") 25G X 1\" 3 ML MISC To be used with B12 injections monthly Yes Sarmad Car MD   bumetanide (BUMEX) 0.5 MG tablet TAKE 1 TABLET BY MOUTH DAILY AS NEEDED FOR LEG SWELLING Yes Sarmad Car MD   lamoTRIgine (LAMICTAL) 200 MG tablet 0.52 04/26/2020    GLUCOSE 107 04/26/2020    GLUCOSE 403 06/03/2012    CALCIUM 8.7 04/26/2020        Lab Results   Component Value Date    ALT 9 04/26/2020    AST 10 04/26/2020    ALKPHOS 135 (H) 04/26/2020    BILITOT <0.15 (L) 04/26/2020       Lab Results   Component Value Date    TSH 1.87 01/07/2020       Lab Results   Component Value Date    CHOL 154 01/07/2020    CHOL 190 04/08/2017    CHOL 250 10/20/2015     Lab Results   Component Value Date    TRIG 102 01/07/2020    TRIG 298 (H) 04/08/2017    TRIG 109 10/20/2015     Lab Results   Component Value Date    HDL 55 01/07/2020    HDL 57 04/08/2017    HDL 70 10/20/2015     Lab Results   Component Value Date    LDLCALC 79 01/07/2020    LDLCALC 158 10/20/2015    LDLCHOLESTEROL 73 04/08/2017    LDLCHOLESTEROL      09/26/2015    LDLCHOLESTEROL 59 10/25/2013       Lab Results   Component Value Date    CHOLHDLRATIO 2.8 01/07/2020    CHOLHDLRATIO 3.3 04/08/2017    CHOLHDLRATIO 3.6 10/20/2015       Lab Results   Component Value Date    LABA1C 9.1 12/20/2019    LABA1C 9.6 (H) 11/20/2018    LABA1C 10.2 01/30/2018         Lab Results   Component Value Date    ATDOVVRT78 236 01/07/2020       Lab Results   Component Value Date    FOLATE 14.3 01/07/2020         ASSESSMENT/PLAN:    1. Type 2 diabetes mellitus with diabetic polyneuropathy, with long-term current use of insulin ([facility] [address]  Likely improving  I reordered her blood work and I encouraged her to have it done  - start pregabalin (LYRICA) 50 MG capsule; Take 1 capsule by mouth 3 times daily for 7 days. Dispense: 21 capsule; Refill: 0  - CBC; Future  - Comprehensive Metabolic Panel; Future  - TSH without Reflex; Future  - Vitamin B12 & Folate; Future  - Hemoglobin A1C; Future    -advised home blood glucose testing 1-2 times daily  -daily feet exam, Foot care: advised to wash feet daily, pat dry and apply lotion at night, avoiding between toes.  Need to look at feet daily and report to a physician any signs of inflammation or

## 2020-11-02 RX ORDER — DAPAGLIFLOZIN 10 MG/1
TABLET, FILM COATED ORAL
Qty: 90 TABLET | Refills: 3 | Status: SHIPPED | OUTPATIENT
Start: 2020-11-02

## 2020-11-02 RX ORDER — ACYCLOVIR 50 MG/G
OINTMENT TOPICAL
Qty: 15 G | Refills: 0 | Status: SHIPPED | OUTPATIENT
Start: 2020-11-02 | End: 2020-12-04

## 2020-11-05 ENCOUNTER — HOSPITAL ENCOUNTER (EMERGENCY)
Age: 40
Discharge: HOME | End: 2020-11-05
Attending: EMERGENCY MEDICINE
Payer: MEDICARE

## 2020-11-05 ENCOUNTER — APPOINTMENT (OUTPATIENT)
Dept: CT IMAGING | Age: 40
End: 2020-11-05
Payer: MEDICARE

## 2020-11-05 VITALS
SYSTOLIC BLOOD PRESSURE: 100 MMHG | HEART RATE: 120 BPM | OXYGEN SATURATION: 94 % | BODY MASS INDEX: 41.81 KG/M2 | DIASTOLIC BLOOD PRESSURE: 58 MMHG | WEIGHT: 275 LBS | RESPIRATION RATE: 16 BRPM | TEMPERATURE: 97.1 F

## 2020-11-05 LAB
ABSOLUTE EOS #: 0.26 K/UL (ref 0–0.44)
ABSOLUTE IMMATURE GRANULOCYTE: <0.03 K/UL (ref 0–0.3)
ABSOLUTE LYMPH #: 1.27 K/UL (ref 1.1–3.7)
ABSOLUTE MONO #: 0.35 K/UL (ref 0.1–1.2)
ALBUMIN SERPL-MCNC: 3.8 G/DL (ref 3.5–5.2)
ALBUMIN/GLOBULIN RATIO: 1.5 (ref 1–2.5)
ALP BLD-CCNC: 130 U/L (ref 35–104)
ALT SERPL-CCNC: 18 U/L (ref 5–33)
ANION GAP SERPL CALCULATED.3IONS-SCNC: 11 MMOL/L (ref 9–17)
AST SERPL-CCNC: 15 U/L
BASOPHILS # BLD: 1 % (ref 0–2)
BASOPHILS ABSOLUTE: 0.03 K/UL (ref 0–0.2)
BILIRUB SERPL-MCNC: 0.26 MG/DL (ref 0.3–1.2)
BUN BLDV-MCNC: 8 MG/DL (ref 6–20)
BUN/CREAT BLD: ABNORMAL (ref 9–20)
CALCIUM SERPL-MCNC: 8.8 MG/DL (ref 8.6–10.4)
CHLORIDE BLD-SCNC: 108 MMOL/L (ref 98–107)
CO2: 22 MMOL/L (ref 20–31)
CREAT SERPL-MCNC: 0.54 MG/DL (ref 0.5–0.9)
DIFFERENTIAL TYPE: ABNORMAL
EOSINOPHILS RELATIVE PERCENT: 5 % (ref 1–4)
GFR AFRICAN AMERICAN: >60 ML/MIN
GFR NON-AFRICAN AMERICAN: >60 ML/MIN
GFR SERPL CREATININE-BSD FRML MDRD: ABNORMAL ML/MIN/{1.73_M2}
GFR SERPL CREATININE-BSD FRML MDRD: ABNORMAL ML/MIN/{1.73_M2}
GLUCOSE BLD-MCNC: 152 MG/DL (ref 70–99)
HCG QUALITATIVE: NEGATIVE
HCT VFR BLD CALC: 40.8 % (ref 36.3–47.1)
HEMOGLOBIN: 12.7 G/DL (ref 11.9–15.1)
IMMATURE GRANULOCYTES: 0 %
LACTIC ACID, WHOLE BLOOD: 2 MMOL/L (ref 0.7–2.1)
LIPASE: 14 U/L (ref 13–60)
LYMPHOCYTES # BLD: 23 % (ref 24–43)
MCH RBC QN AUTO: 28.2 PG (ref 25.2–33.5)
MCHC RBC AUTO-ENTMCNC: 31.1 G/DL (ref 28.4–34.8)
MCV RBC AUTO: 90.7 FL (ref 82.6–102.9)
MONOCYTES # BLD: 6 % (ref 3–12)
NRBC AUTOMATED: 0 PER 100 WBC
PDW BLD-RTO: 13.7 % (ref 11.8–14.4)
PLATELET # BLD: 264 K/UL (ref 138–453)
PLATELET ESTIMATE: ABNORMAL
PMV BLD AUTO: 12.7 FL (ref 8.1–13.5)
POTASSIUM SERPL-SCNC: 3.7 MMOL/L (ref 3.7–5.3)
RBC # BLD: 4.5 M/UL (ref 3.95–5.11)
RBC # BLD: ABNORMAL 10*6/UL
SEG NEUTROPHILS: 65 % (ref 36–65)
SEGMENTED NEUTROPHILS ABSOLUTE COUNT: 3.66 K/UL (ref 1.5–8.1)
SODIUM BLD-SCNC: 141 MMOL/L (ref 135–144)
TOTAL PROTEIN: 6.3 G/DL (ref 6.4–8.3)
WBC # BLD: 5.6 K/UL (ref 3.5–11.3)
WBC # BLD: ABNORMAL 10*3/UL

## 2020-11-05 PROCEDURE — 96374 THER/PROPH/DIAG INJ IV PUSH: CPT

## 2020-11-05 PROCEDURE — 6360000004 HC RX CONTRAST MEDICATION: Performed by: STUDENT IN AN ORGANIZED HEALTH CARE EDUCATION/TRAINING PROGRAM

## 2020-11-05 PROCEDURE — 83605 ASSAY OF LACTIC ACID: CPT

## 2020-11-05 PROCEDURE — 96375 TX/PRO/DX INJ NEW DRUG ADDON: CPT

## 2020-11-05 PROCEDURE — 99284 EMERGENCY DEPT VISIT MOD MDM: CPT

## 2020-11-05 PROCEDURE — 80053 COMPREHEN METABOLIC PANEL: CPT

## 2020-11-05 PROCEDURE — 96376 TX/PRO/DX INJ SAME DRUG ADON: CPT

## 2020-11-05 PROCEDURE — 6360000002 HC RX W HCPCS: Performed by: STUDENT IN AN ORGANIZED HEALTH CARE EDUCATION/TRAINING PROGRAM

## 2020-11-05 PROCEDURE — 2580000003 HC RX 258: Performed by: STUDENT IN AN ORGANIZED HEALTH CARE EDUCATION/TRAINING PROGRAM

## 2020-11-05 PROCEDURE — 96365 THER/PROPH/DIAG IV INF INIT: CPT

## 2020-11-05 PROCEDURE — 85025 COMPLETE CBC W/AUTO DIFF WBC: CPT

## 2020-11-05 PROCEDURE — 74177 CT ABD & PELVIS W/CONTRAST: CPT

## 2020-11-05 PROCEDURE — 83690 ASSAY OF LIPASE: CPT

## 2020-11-05 PROCEDURE — 84703 CHORIONIC GONADOTROPIN ASSAY: CPT

## 2020-11-05 RX ORDER — OXYCODONE HYDROCHLORIDE AND ACETAMINOPHEN 5; 325 MG/1; MG/1
1 TABLET ORAL EVERY 6 HOURS PRN
Qty: 12 TABLET | Refills: 0 | Status: SHIPPED | OUTPATIENT
Start: 2020-11-05 | End: 2020-11-08

## 2020-11-05 RX ORDER — 0.9 % SODIUM CHLORIDE 0.9 %
1000 INTRAVENOUS SOLUTION INTRAVENOUS ONCE
Status: COMPLETED | OUTPATIENT
Start: 2020-11-05 | End: 2020-11-05

## 2020-11-05 RX ORDER — MORPHINE SULFATE 4 MG/ML
4 INJECTION, SOLUTION INTRAMUSCULAR; INTRAVENOUS ONCE
Status: COMPLETED | OUTPATIENT
Start: 2020-11-05 | End: 2020-11-05

## 2020-11-05 RX ORDER — ONDANSETRON 4 MG/1
4 TABLET, ORALLY DISINTEGRATING ORAL EVERY 8 HOURS PRN
Qty: 9 TABLET | Refills: 0 | Status: SHIPPED | OUTPATIENT
Start: 2020-11-05 | End: 2022-02-01

## 2020-11-05 RX ORDER — ONDANSETRON 2 MG/ML
4 INJECTION INTRAMUSCULAR; INTRAVENOUS ONCE
Status: COMPLETED | OUTPATIENT
Start: 2020-11-05 | End: 2020-11-05

## 2020-11-05 RX ADMIN — SODIUM CHLORIDE 1000 ML: 9 INJECTION, SOLUTION INTRAVENOUS at 12:58

## 2020-11-05 RX ADMIN — ONDANSETRON 4 MG: 2 INJECTION INTRAMUSCULAR; INTRAVENOUS at 12:58

## 2020-11-05 RX ADMIN — MORPHINE SULFATE 4 MG: 4 INJECTION INTRAVENOUS at 12:58

## 2020-11-05 RX ADMIN — IOPAMIDOL 75 ML: 755 INJECTION, SOLUTION INTRAVENOUS at 14:01

## 2020-11-05 RX ADMIN — MORPHINE SULFATE 4 MG: 4 INJECTION INTRAVENOUS at 14:26

## 2020-11-05 ASSESSMENT — PAIN DESCRIPTION - LOCATION
LOCATION: ABDOMEN
LOCATION: ABDOMEN

## 2020-11-05 ASSESSMENT — ENCOUNTER SYMPTOMS
COUGH: 0
VOMITING: 1
SHORTNESS OF BREATH: 0
BLOOD IN STOOL: 0
BACK PAIN: 0
NAUSEA: 1
ABDOMINAL DISTENTION: 0
ABDOMINAL PAIN: 1
DIARRHEA: 0
CONSTIPATION: 0

## 2020-11-05 ASSESSMENT — PAIN SCALES - GENERAL
PAINLEVEL_OUTOF10: 7
PAINLEVEL_OUTOF10: 8

## 2020-11-05 ASSESSMENT — PAIN DESCRIPTION - PROGRESSION: CLINICAL_PROGRESSION: GRADUALLY IMPROVING

## 2020-11-05 ASSESSMENT — PAIN DESCRIPTION - PAIN TYPE
TYPE: ACUTE PAIN
TYPE: ACUTE PAIN

## 2020-11-05 ASSESSMENT — PAIN DESCRIPTION - FREQUENCY: FREQUENCY: CONTINUOUS

## 2020-11-05 NOTE — ED PROVIDER NOTES
[facility] [address] [facility]     Emergency Department     Faculty Attestation        I performed a history and physical examination of the patient and discussed management with the resident. I reviewed the residents note and agree with the documented findings and plan of care. Any areas of disagreement are noted on the chart. I was personally present for the key portions of any procedures. I have documented in the chart those procedures where I was not present during the key portions. I have reviewed the emergency nurses triage note. I agree with the chief complaint, past medical history, past surgical history, allergies, medications, social and family history as documented unless otherwise noted below. For Physician Assistant/ Nurse Practitioner cases/documentation I have personally evaluated this patient and have completed at least one if not all key elements of the E/M (history, physical exam, and MDM). Additional findings are as noted. Vital Signs: BP: 136/73  Pulse: 120  Resp: 16    SpO2: 100 %  PCP:  Mary Rivas MD    Pertinent Comments:     Patient is a 70-year-old female with multiple previous abdominal surgeries done here as well as at the [facility] clinic. Patient has a ventral hernia that she feels has been nonreducible for the last 4 if not 5 days. Still passing gas and having bowel movement however nausea and vomiting associated as well as increasing pain. Denies fevers or chills. Body habitus limits exam severely however several scars noted on the abdomen and is nonperitoneal at this time. Assessment/plan: Possible incarcerated hernia and will obtain abdominal laboratories as well as CT of the abdomen/pelvis. Attempted symptomatic control and reevaluate after    Critical Care  None    This patient was evaluated in the Emergency Department for symptoms described in the history of present illness.  He/she was evaluated in the context of the global COVID-19 pandemic, which necessitated consideration that the patient might be at risk for infection with the SARS-CoV-2 virus that causes COVID-19. Institutional protocols and algorithms that pertain to the evaluation of patients at risk for COVID-19 are in a state of rapid change based on information released by regulatory bodies including the CDC and federal and state organizations. These policies and algorithms were followed during the patient's care in the ED. (Please note that portions of this note were completed with a voice recognition program. Efforts were made to edit the dictations but occasionally words are mis-transcribed.  Whenever words are used in this note in any gender, they shall be construed as though they were used in the gender appropriate to the circumstances; and whenever words are used in this note in the singular or plural form, they shall be construed as though they were used in the form appropriate to the circumstances.)    MD David Lara  Attending Emergency Medicine Physician             Emely Crystal MD  11/05/20 2430       Emely Crystal MD  11/05/20 0515

## 2020-11-05 NOTE — ED NOTES
Bed: 03  Expected date:   Expected time:   Means of arrival:   Comments:  [facility] [address] [facility], RN  11/05/20 [id number]

## 2020-11-05 NOTE — ED PROVIDER NOTES
[facility] ED  Emergency Department Encounter  Emergency Medicine Resident     Pt Name: Yael Bhakta  MRN: [id number]  [place] 1980  Date of evaluation: 11/5/20  PCP:  Amaris Akins MD    [address] [place] [address]       Chief Complaint   Patient presents with    Abdominal Pain    Hernia     reports that she is usually able to reduce hernia on her own but today she was unable to reduce and having alot of pain       HISTORY OFPRESENT ILLNESS  (Location/Symptom, Timing/Onset, Context/Setting, Quality, Duration, Modifying Factors,Severity.)      Yael Bhakta is a 37 yo female who presents with abdominal pain and hernia. Patient states that she has a long history of abdominal surgeries as well as hernias and that for the past year she has been having a reducible hernia to her upper periumbilical area and that for the past 5 days she has not been able to reduce it. She states that the pain is been getting worse with associated nausea and vomiting. She states that she is still passing gas. Denies any fevers, chills, chest pain or difficulty breathing.     PAST MEDICAL / SURGICAL / SOCIAL / FAMILY HISTORY      has a past medical history of Alcohol abuse, Anxiety, Arthritis, Painting esophagus, Benzodiazepine overdose, Bipolar disorder, unspecified ([address] Bipolar I disorder, most recent episode depressed, severe without psychotic features ([address] Cellulitis, Chronic abdominal wound infection, Constipation, Depression, Drug overdose, intentional ([address] Genital herpes, unspecified, GERD (gastroesophageal reflux disease), History of pulmonary embolism, Hx of blood clots, Hypertension, Iron deficiency anemia, Isopropyl alcohol poisoning, Lumbosacral spondylosis without myelopathy, MDRO (multiple drug resistant organisms) resistance, Miscarriage, Morbid obesity ([address] MRSA (methicillin resistant staph aureus) culture positive, Overdose of benzodiazepine, Pernicious anemia, Primary hypercoagulable on file     Relationship status: Not on file    Intimate partner violence     Fear of current or ex partner: Not on file     Emotionally abused: Not on file     Physically abused: Not on file     Forced sexual activity: Not on file   Other Topics Concern    Not on file   Social History Narrative    Lives with Nancy Ham ex  and daughter            Family History   Problem Relation Age of Onset    Depression Mother     High Blood Pressure Mother     High Cholesterol Mother     Diabetes Father     Heart Disease Father     Kidney Disease Father     High Blood Pressure Father     High Cholesterol Father     Cancer Maternal Uncle         of duodenum had whipple    Breast Cancer Maternal Aunt     Breast Cancer Maternal Cousin         Allergies:  Asa [aspirin]; Betadine [povidone iodine]; Celexa [citalopram hydrobromide]; Citalopram; Lasix [furosemide]; Macrobid [nitrofurantoin]; Norco [hydrocodone-acetaminophen]; Betadine [povidone iodine]; Codeine; Lithium; Paroxetine; Paxil [paroxetine hcl]; Tape Christian Finder tape]; and Tegretol [carbamazepine]    Home Medications:  Prior to Admission medications    Medication Sig Start Date End Date Taking? Authorizing Provider   oxyCODONE-acetaminophen (PERCOCET) 5-325 MG per tablet Take 1 tablet by mouth every 6 hours as needed for Pain for up to 3 days. Intended supply: 3 days. Take lowest dose possible to manage pain 11/5/20 11/8/20 Yes Nahid Gurrola,    ondansetron (ZOFRAN ODT) 4 MG disintegrating tablet Take 1 tablet by mouth every 8 hours as needed for Nausea 11/5/20  Yes Nahid Gurrola, DO   acyclovir (ZOVIRAX) 5 % ointment APPLY EXTERNALLY TO THE AFFECTED AREA FIVE TIMES DAILY FOR 10 DAYS 11/2/20   Marion Barnes MD   FARXIGA 10 MG tablet TAKE 1 TABLET BY MOUTH EVERY MORNING 11/2/20   Marion Barnes MD   pregabalin (LYRICA) 50 MG capsule Take 1 capsule by mouth 3 times daily for 7 days.  10/6/20 10/13/20  MD MISHA Perez 300 UNIT/ML SOPN INJECT 70 UNITS INTO THE SKIN EVERY MORNING 9/28/20   Sandy Rogel MD   tiZANidine (ZANAFLEX) 4 MG tablet TAKE 1 TABLET BY MOUTH TWICE DAILY AS NEEDED FOR BACK PAIN 9/21/20   Sandy Rogel MD   sucralfate (CARAFATE) 1 GM tablet DISSOLVE 1 TABLET INTO 15 ML( 1 TABLESPOON) OF WATER AND DRINK BY MOUTH FOUR TIMES DAILY AS NEEDED FOR GERD 7/27/20   Cinda Louis MD   dilTIAZem (DILTIAZEM CD) 240 MG extended release capsule Take 240 mg by mouth daily Take one capsule by mouth daily. Historical Provider, MD   cyanocobalamin 1000 MCG/ML injection ADMINISTER 1 ML IN THE MUSCLE EVERY 7 DAYS 6/26/20   Cinda Louis MD   pravastatin (PRAVACHOL) 40 MG tablet TAKE 1 TABLET(40 MG) BY MOUTH DAILY 6/26/20   Sandy Rogel MD   ondansetron (ZOFRAN-ODT) 4 MG disintegrating tablet DISSOLVE ONE TABLET BY MOUTH THREE TIMES DAILY AS NEEDED FOR NAUSEA AND VOMITING 6/23/20   Cinda Louis MD   valACYclovir (VALTREX) 500 MG tablet Take 1 tablet by mouth daily 6/8/20   Cinda Louis MD   benazepril (LOTENSIN) 20 MG tablet Take 1 tablet by mouth daily 5/1/20   Cinda Louis MD   ondansetron (ZOFRAN) 4 MG tablet Take 1 tablet by mouth 3 times daily as needed for Nausea or Vomiting 4/27/20   Rosie Smith MD   famotidine (PEPCID) 20 MG tablet Take 1 tablet by mouth 2 times daily 3/4/20   Cinda Louis MD   acetaminophen (TYLENOL) 500 MG tablet Take 2 tablets by mouth every 8 hours as needed for Pain 2/19/20   Ayana Camp, DO   LORazepam (ATIVAN) 2 MG tablet  12/18/19   Historical Provider, MD   GLUCAGEN HYPOKIT 1 MG SOLR injection  12/19/19   Historical Provider, MD   glucose monitoring kit (FREESTYLE) monitoring kit Testing twice a day. Please dispense according to patients insurance. 12/20/19   Cinda Louis MD   Insulin Pen Needle 31G X 5 MM MISC 1 each by Does not apply route daily 12/20/19   Cinda Louis MD   Lancets 30G MISC Testing twice a day. Cardiovascular: Negative for chest pain, palpitations and leg swelling. Gastrointestinal: Positive for abdominal pain, nausea and vomiting. Negative for abdominal distention, blood in stool, constipation and diarrhea. Genitourinary: Negative for dysuria and hematuria. Musculoskeletal: Negative for back pain. Skin: Negative for rash and wound. Neurological: Negative for dizziness, weakness, light-headedness, numbness and headaches. PHYSICAL EXAM   (up to 7 for level 4, 8 or more forlevel 5)      INITIAL VITALS:   Vitals:    11/05/20 1316 11/05/20 1332 11/05/20 1346 11/05/20 1357   BP: 114/62 115/66 (!) 100/58    Pulse:       Resp:       Temp:       SpO2: 96% 95% 95% 94%   Weight:         Pulse: 120 initially, resolved with fluids  RR: 16  Temp: 97.1        Physical Exam  Vitals signs and nursing note reviewed. Constitutional:       General: She is not in acute distress. Appearance: She is not ill-appearing, toxic-appearing or diaphoretic. Cardiovascular:      Rate and Rhythm: Regular rhythm. Tachycardia present. Heart sounds: No murmur. No gallop. Pulmonary:      Effort: Pulmonary effort is normal.      Breath sounds: Normal breath sounds. Abdominal:      General: There is no distension. Palpations: Abdomen is soft. Tenderness: There is abdominal tenderness. There is no guarding or rebound. Comments: Multiple areas of scarring over the abdomen secondary to multiple surgeries. Very thin abdominal wall, patient is obese, difficult to palpate hernia. Musculoskeletal:      Right lower leg: No edema. Left lower leg: No edema. Skin:     General: Skin is warm and dry. Findings: No erythema or rash. Neurological:      Mental Status: She is alert.          DIFFERENTIAL  DIAGNOSIS     PLAN (LABS / IMAGING / EKG):  Orders Placed This Encounter   Procedures    CT ABDOMEN PELVIS W IV CONTRAST Additional Contrast? None    Lactic Acid, Whole Blood    CBC WITH AUTO DIFFERENTIAL    Comprehensive Metabolic Panel    LIPASE    HCG Qualitative, Serum       MEDICATIONS ORDERED:  Orders Placed This Encounter   Medications    0.9 % sodium chloride bolus    morphine injection 4 mg    ondansetron (ZOFRAN) injection 4 mg    iopamidol (ISOVUE-370) 76 % injection 75 mL    morphine injection 4 mg    oxyCODONE-acetaminophen (PERCOCET) 5-325 MG per tablet     Sig: Take 1 tablet by mouth every 6 hours as needed for Pain for up to 3 days. Intended supply: 3 days. Take lowest dose possible to manage pain     Dispense:  12 tablet     Refill:  0    ondansetron (ZOFRAN ODT) 4 MG disintegrating tablet     Sig: Take 1 tablet by mouth every 8 hours as needed for Nausea     Dispense:  9 tablet     Refill:  0       DDX: Hernia, incarcerated hernia, small bowel obstruction, chronic abdominal pain, pancreatitis    Initial MDM/Plan/ED course: 38 y.o. female who presents with abdominal pain and concern for hernia. On exam patient is tachycardic but otherwise vitals are normal patient is in no acute distress. Physical exam reveals multiple areas of scarred and thinned out tissue over the abdomen with upper periumbilical and epigastric tenderness. Patient is also obese, difficult determining physical exam if there is hernia present or not. Abdominal labs, lactic acid, and CT of the abdomen with IV contrast were obtained. Work-up was unremarkable and showed no evidence of hernia or obstruction. Patient pain nausea treated with morphine and Zofran, tachycardia treated with a liter of normal saline with resolution of tachycardia. Patient given pain medication on discharge with instructions to call her hernia specialist today for follow-up for chronic abdominal pain.     DIAGNOSTIC RESULTS / EMERGENCY DEPARTMENT COURSE / MDM     LABS:  Labs Reviewed   CBC WITH AUTO DIFFERENTIAL - Abnormal; Notable for the following components:       Result Value    Lymphocytes 23 (*)     Eosinophils % 5 (*) All other components within normal limits   COMPREHENSIVE METABOLIC PANEL - Abnormal; Notable for the following components:    Glucose 152 (*)     Chloride 108 (*)     Alkaline Phosphatase 130 (*)     Total Bilirubin 0.26 (*)     Total Protein 6.3 (*)     All other components within normal limits   LACTIC ACID, WHOLE BLOOD   LIPASE   HCG, SERUM, QUALITATIVE         RADIOLOGY:  Ct Abdomen Pelvis W Iv Contrast Additional Contrast? None    Result Date: 11/5/2020  EXAMINATION: CT OF THE ABDOMEN AND PELVIS WITH CONTRAST 11/5/2020 2:01 pm TECHNIQUE: CT of the abdomen and pelvis was performed with the administration of intravenous contrast. Multiplanar reformatted images are provided for review. Dose modulation, iterative reconstruction, and/or weight based adjustment of the mA/kV was utilized to reduce the radiation dose to as low as reasonably achievable. COMPARISON: 04/27/2020 HISTORY: ORDERING SYSTEM PROVIDED HISTORY: concern for strangulated hernia TECHNOLOGIST PROVIDED HISTORY: concern for strangulated hernia Reason for Exam: concern for strangulated hernia Acuity: Acute Type of Exam: Initial FINDINGS: Lower Chest: The visualized heart and lungs show no acute abnormalities. Organs: Cholecystectomy. The liver, spleen, pancreas, adrenal glands and kidneys enhance normally without significant abnormality. Note is made of some parapelvic left renal cysts. GI/Bowel: There is limited evaluation due to absence of oral contrast. Gastric bypass surgery noted. There is no evidence for bowel obstruction. Evaluation of the colon and small bowel show no significant focal lesions. Appendix not visualized however no secondary signs of acute appendicitis. Pelvis: The urinary bladder is unremarkable. Hysterectomy noted. A few surgical clips are scattered in the pelvis. Peritoneum/Retroperitoneum: No free fluid. No significant lymphadenopathy. IVC filter in place. Bones/Soft Tissues: No acute abnormality of the bones.

## 2020-11-06 ENCOUNTER — TELEPHONE (OUTPATIENT)
Dept: FAMILY MEDICINE CLINIC | Age: 40
End: 2020-11-06

## 2020-11-06 NOTE — TELEPHONE ENCOUNTER
[facility]) ED Follow up Call     Reason for ED visit:  Abdominal pain, epigastri      11/6/2020           FU appts/Provider:    Future Appointments   Date Time Provider Maria Luisa Denise   11/27/2020  8:00 AM MD willam Laurent 19. IF NOT USED  Hi, this message is for [facility]. This is Morna Ramp from [address] [place] [address] office. Just calling to see how you are doing after your recent visit to the Emergency Room. [address] [place] [address] wants to make sure you were able to fill any prescriptions and that you understand your discharge instructions. Please return our call if you need to make a follow up appointment with your provider or have any further needs. Our phone number is [phone]. Have a great day.

## 2020-12-01 ENCOUNTER — APPOINTMENT (OUTPATIENT)
Dept: GENERAL RADIOLOGY | Age: 40
End: 2020-12-01
Payer: MEDICARE

## 2020-12-01 ENCOUNTER — HOSPITAL ENCOUNTER (EMERGENCY)
Age: 40
Discharge: HOME | End: 2020-12-01
Attending: EMERGENCY MEDICINE
Payer: MEDICARE

## 2020-12-01 ENCOUNTER — APPOINTMENT (OUTPATIENT)
Dept: CT IMAGING | Age: 40
End: 2020-12-01
Payer: MEDICARE

## 2020-12-01 VITALS
SYSTOLIC BLOOD PRESSURE: 120 MMHG | RESPIRATION RATE: 16 BRPM | WEIGHT: 230 LBS | TEMPERATURE: 97 F | OXYGEN SATURATION: 94 % | DIASTOLIC BLOOD PRESSURE: 85 MMHG | HEART RATE: 118 BPM | HEIGHT: 69 IN | BODY MASS INDEX: 34.07 KG/M2

## 2020-12-01 PROCEDURE — 96372 THER/PROPH/DIAG INJ SC/IM: CPT

## 2020-12-01 PROCEDURE — 99283 EMERGENCY DEPT VISIT LOW MDM: CPT

## 2020-12-01 PROCEDURE — 71045 X-RAY EXAM CHEST 1 VIEW: CPT

## 2020-12-01 PROCEDURE — 70450 CT HEAD/BRAIN W/O DYE: CPT

## 2020-12-01 PROCEDURE — 72125 CT NECK SPINE W/O DYE: CPT

## 2020-12-01 PROCEDURE — 6360000002 HC RX W HCPCS: Performed by: STUDENT IN AN ORGANIZED HEALTH CARE EDUCATION/TRAINING PROGRAM

## 2020-12-01 RX ORDER — MORPHINE SULFATE 4 MG/ML
4 INJECTION, SOLUTION INTRAMUSCULAR; INTRAVENOUS ONCE
Status: COMPLETED | OUTPATIENT
Start: 2020-12-01 | End: 2020-12-01

## 2020-12-01 RX ORDER — CYCLOBENZAPRINE HCL 5 MG
5 TABLET ORAL NIGHTLY PRN
Qty: 2 TABLET | Refills: 0 | Status: SHIPPED | OUTPATIENT
Start: 2020-12-01 | End: 2020-12-01

## 2020-12-01 RX ADMIN — MORPHINE SULFATE 4 MG: 4 INJECTION INTRAVENOUS at 14:06

## 2020-12-01 ASSESSMENT — ENCOUNTER SYMPTOMS
SINUS PRESSURE: 0
SHORTNESS OF BREATH: 0
ABDOMINAL PAIN: 0
BACK PAIN: 1
NAUSEA: 0
DIARRHEA: 0
VOMITING: 0

## 2020-12-01 ASSESSMENT — PAIN DESCRIPTION - DESCRIPTORS
DESCRIPTORS: CONSTANT
DESCRIPTORS: CONSTANT

## 2020-12-01 ASSESSMENT — PAIN DESCRIPTION - PAIN TYPE
TYPE: ACUTE PAIN
TYPE: ACUTE PAIN

## 2020-12-01 ASSESSMENT — PAIN SCALES - GENERAL
PAINLEVEL_OUTOF10: 7
PAINLEVEL_OUTOF10: 9
PAINLEVEL_OUTOF10: 9

## 2020-12-01 ASSESSMENT — PAIN DESCRIPTION - ORIENTATION
ORIENTATION: RIGHT
ORIENTATION: RIGHT

## 2020-12-01 ASSESSMENT — PAIN DESCRIPTION - PROGRESSION: CLINICAL_PROGRESSION: GRADUALLY IMPROVING

## 2020-12-01 ASSESSMENT — PAIN DESCRIPTION - LOCATION
LOCATION: NECK;SHOULDER
LOCATION: SHOULDER

## 2020-12-01 ASSESSMENT — PAIN DESCRIPTION - FREQUENCY: FREQUENCY: CONTINUOUS

## 2020-12-01 ASSESSMENT — PAIN DESCRIPTION - ONSET: ONSET: ON-GOING

## 2020-12-01 NOTE — ED NOTES
Pt presents to the ED after falling shoveling snow. Pt states she slipped and fell on the steps hitting her right shoulder and rolled into her neck. Pt states no loss of consciousness, pt states she took motrin and tylenol with no relief. States pain is 9/10. Pt has full rom bilaterally, and gait is steady. Vital signs stable will continue to monitor.      Farheen Jimenez  12/01/20 [id number]

## 2020-12-01 NOTE — ED PROVIDER NOTES
resistant staph aureus) culture positive, Overdose of benzodiazepine, Pernicious anemia, Primary hypercoagulable state ([facility] [address] Pulmonary embolism ([facility] [address] Suicidal behavior, Suicidal ideation, Suicide attempt ([facility] [address] SVT (supraventricular tachycardia) ([facility] [address] Toxic effect of ethanol, intentional self-harm ([facility] [address] and Type 2 diabetes mellitus, with long-term current use of insulin ([facility] [address] has a past surgical history that includes Cholecystectomy; Liver Resection; hernia repair; Tonsillectomy; Endoscopy, colon, diagnostic; Abdominal hernia repair (2014); Abdominal hernia repair (11/3/14); Bariatric Surgery (2013); Dilation and curettage of uterus (2005); Finger amputation (Left, 02/09/2015); lymph node biopsy (1990); yariel and bso (cervix removed) (2011); and IVC filter insertion. Social History     Socioeconomic History    Marital status:      Spouse name: Not on file    Number of children: 1    Years of education: 3 years of college    Highest education level: Not on file   Occupational History    Occupation: infant care     Comment: last worked 2008   Social Needs    Financial resource strain: Not on file    Food insecurity     Worry: Not on file     Inability: Not on file   [employer] needs     Medical: Not on file     Non-medical: Not on file   Tobacco Use    Smoking status: Never Smoker    Smokeless tobacco: Never Used   Substance and Sexual Activity    Alcohol use: No     Alcohol/week: 0.0 standard drinks     Comment: Last alcohol use was in 12/2015    Drug use: No     Comment: Pt stole her mom's Benzo 1 yr ago. Pt said she took more than prescribed dose of Valium once in late 90's.     Sexual activity: Yes     Partners: Male   Lifestyle    Physical activity     Days per week: Not on file     Minutes per session: Not on file    Stress: Not on file   Relationships    Social connections     Talks on phone: Not on file     Gets together: Not on file     Attends Synagogue service: Not on file     Active member of club or organization: Not on file     Attends meetings of clubs or organizations: Not on file     Relationship status: Not on file    Intimate partner violence     Fear of current or ex partner: Not on file     Emotionally abused: Not on file     Physically abused: Not on file     Forced sexual activity: Not on file   Other Topics Concern    Not on file   Social History Narrative    Lives with Cash Carmona ex  and daughter            Family History   Problem Relation Age of Onset    Depression Mother     High Blood Pressure Mother     High Cholesterol Mother     Diabetes Father     Heart Disease Father     Kidney Disease Father     High Blood Pressure Father     High Cholesterol Father     Cancer Maternal Uncle         of duodenum had whipple    Breast Cancer Maternal Aunt     Breast Cancer Maternal Cousin        Allergies:  Asa [aspirin]; Betadine [povidone iodine]; Celexa [citalopram hydrobromide]; Citalopram; Lasix [furosemide]; Macrobid [nitrofurantoin]; Norco [hydrocodone-acetaminophen]; Betadine [povidone iodine]; Codeine; Lithium; Paroxetine; Paxil [paroxetine hcl]; Tape Zakiya Smolder tape]; and Tegretol [carbamazepine]    Home Medications:  Prior to Admission medications    Medication Sig Start Date End Date Taking? Authorizing Provider   ondansetron (ZOFRAN ODT) 4 MG disintegrating tablet Take 1 tablet by mouth every 8 hours as needed for Nausea 11/5/20   Meryle Mares, DO   acyclovir (ZOVIRAX) 5 % ointment APPLY EXTERNALLY TO THE AFFECTED AREA FIVE TIMES DAILY FOR 10 DAYS 11/2/20   Virgil Sloan MD   FARXIGA 10 MG tablet TAKE 1 TABLET BY MOUTH EVERY MORNING 11/2/20   Virgil Sloan MD   pregabalin (LYRICA) 50 MG capsule Take 1 capsule by mouth 3 times daily for 7 days.  10/6/20 10/13/20  MD MISHA Saleem 300 UNIT/ML SOPN INJECT 70 UNITS INTO THE SKIN EVERY MORNING 9/28/20   Virgil Sloan MD   tiZANidine (ZANAFLEX) 4 MG tablet TAKE 1 TABLET BY MOUTH TWICE DAILY AS NEEDED FOR BACK PAIN 9/21/20   Sandy Rogel MD   sucralfate (CARAFATE) 1 GM tablet DISSOLVE 1 TABLET INTO 15 ML( 1 TABLESPOON) OF WATER AND DRINK BY MOUTH FOUR TIMES DAILY AS NEEDED FOR GERD 7/27/20   Efrain Santiago MD   dilTIAZem (DILTIAZEM CD) 240 MG extended release capsule Take 240 mg by mouth daily Take one capsule by mouth daily. Historical Provider, MD   cyanocobalamin 1000 MCG/ML injection ADMINISTER 1 ML IN THE MUSCLE EVERY 7 DAYS 6/26/20   Efrain Santiago MD   pravastatin (PRAVACHOL) 40 MG tablet TAKE 1 TABLET(40 MG) BY MOUTH DAILY 6/26/20   Sandy Rogel MD   ondansetron (ZOFRAN-ODT) 4 MG disintegrating tablet DISSOLVE ONE TABLET BY MOUTH THREE TIMES DAILY AS NEEDED FOR NAUSEA AND VOMITING 6/23/20   Efrain Santiago MD   valACYclovir (VALTREX) 500 MG tablet Take 1 tablet by mouth daily 6/8/20   Efrain Santiago MD   benazepril (LOTENSIN) 20 MG tablet Take 1 tablet by mouth daily 5/1/20   Efrain Santiago MD   ondansetron (ZOFRAN) 4 MG tablet Take 1 tablet by mouth 3 times daily as needed for Nausea or Vomiting 4/27/20   Nena Story MD   famotidine (PEPCID) 20 MG tablet Take 1 tablet by mouth 2 times daily 3/4/20   Efrain Santiago MD   acetaminophen (TYLENOL) 500 MG tablet Take 2 tablets by mouth every 8 hours as needed for Pain 2/19/20   Ayana Hodges,    LORazepam (ATIVAN) 2 MG tablet  12/18/19   Historical Provider, MD   GLUCAGEN HYPOKIT 1 MG SOLR injection  12/19/19   Historical Provider, MD   glucose monitoring kit (FREESTYLE) monitoring kit Testing twice a day. Please dispense according to patients insurance. 12/20/19   Efrain Santiago MD   Insulin Pen Needle 31G X 5 MM MISC 1 each by Does not apply route daily 12/20/19   Efrain Santiago MD   Lancets 30G MISC Testing twice a day. Please dispense according to patients insurance.  12/20/19   Efrain Santiago MD   blood glucose monitor strips Testing twice a day.  Please dispense according to patients insurance. 12/20/19   Leidy Phillip MD   fondaparinux (ARIXTRA) 10 MG/0.8ML SOLN injection Inject 0.8 mLs into the skin daily 12/20/19   Leidy Phillip, MD   KLOR-CON M10 10 MEQ extended release tablet Take 2 tablets by mouth daily as needed (take with bumex) 12/20/19   Leidy Phillip MD   cyanocobalamin 1000 MCG/ML injection Inject 1 mL into the muscle once for 1 dose  Patient not taking: Reported on 9/29/2020 12/20/19 12/20/19  Leidy Phillip, MD   Syringe/Needle, Disp, (SYRINGE 3CC/25GX1\") 25G X 1\" 3 ML MISC To be used with B12 injections monthly 12/20/19   Leidy Phillip MD   bumetanide (BUMEX) 0.5 MG tablet TAKE 1 TABLET BY MOUTH DAILY AS NEEDED FOR LEG SWELLING 12/20/19   Leidy Phillip MD   lamoTRIgine (LAMICTAL) 200 MG tablet Take 200 mg by mouth daily    Historical Provider, MD   amphetamine-dextroamphetamine (ADDERALL, 30MG,) 30 MG tablet Take 30 mg by mouth daily. Historical Provider, MD   dicyclomine (BENTYL) 10 MG capsule Take 1 capsule by mouth every 6 hours as needed (cramps) 11/3/19   Dwight Casper MD   QUEtiapine (SEROQUEL) 100 MG tablet Take 50 mg by mouth nightly as needed     Historical Provider, MD   Multiple Vitamins-Minerals (THERAPEUTIC MULTIVITAMIN-MINERALS) tablet Take 1 tablet by mouth daily    Historical Provider, MD   buPROPion (WELLBUTRIN XL) 300 MG extended release tablet Take 300 mg by mouth every morning    Historical Provider, MD   vitamin D (CHOLECALCIFEROL) 1000 UNIT TABS tablet Take 10,000 Units by mouth daily 5 days a week    Historical Provider, MD       REVIEW OF SYSTEMS    (2-9 systems for level 4, 10 or more for level 5)      Review of Systems   Constitutional: Negative for chills and fever. HENT: Negative for congestion and sinus pressure. Respiratory: Negative for shortness of breath. Cardiovascular: Negative for chest pain.    Gastrointestinal: Negative for abdominal pain, diarrhea, nausea and vomiting. Genitourinary: Negative for pelvic pain. Musculoskeletal: Positive for arthralgias, back pain and neck pain. Negative for gait problem and myalgias. Skin: Negative for wound. Neurological: Positive for headaches. Negative for dizziness, syncope and numbness. PHYSICAL EXAM   (up to 7 for level 4, 8 or more for level 5)      INITIAL VITALS:   /85   Pulse 118   Temp 97 °F (36.1 °C) (Oral)   Resp 16   Ht 5' 9\" (1.753 m)   Wt 230 lb (104.3 kg)   SpO2 94%   BMI 33.97 kg/m²     Physical Exam  Constitutional:       General: She is not in acute distress. Appearance: Normal appearance. She is not ill-appearing. HENT:      Head: Normocephalic and atraumatic. Right Ear: External ear normal.      Left Ear: External ear normal.      Mouth/Throat:      Mouth: Mucous membranes are moist.      Pharynx: Oropharynx is clear. Neck:      Musculoskeletal: Neck supple. Comments: C-collar in place, mild tenderness to palpation midline lower cervical spine. Cardiovascular:      Rate and Rhythm: Normal rate and regular rhythm. Pulses: Normal pulses. Heart sounds: Normal heart sounds. No murmur. Pulmonary:      Effort: Pulmonary effort is normal. No respiratory distress. Breath sounds: Normal breath sounds. No wheezing. Musculoskeletal: Normal range of motion. Comments: Tenderness to palpation along upper thoracic spine and paraspinal muscles, including scapular region and posterior right shoulder. Range of motion normal.  No obvious deformity, swelling, injury. Skin:     General: Skin is warm and dry. Findings: No bruising. Neurological:      General: No focal deficit present. Mental Status: She is alert and oriented to person, place, and time. Sensory: No sensory deficit. Motor: No weakness.          DIFFERENTIAL  DIAGNOSIS     PLAN (LABS / IMAGING / EKG):  Orders Placed This Encounter   Procedures    CT Head WO Contrast    CT Cervical Spine WO Contrast    XR CHEST PORTABLE       MEDICATIONS ORDERED:  Orders Placed This Encounter   Medications    morphine injection 4 mg    DISCONTD: cyclobenzaprine (FLEXERIL) 5 MG tablet     Sig: Take 1 tablet by mouth nightly as needed for Muscle spasms     Dispense:  2 tablet     Refill:  0       DIAGNOSTIC RESULTS / EMERGENCY DEPARTMENT COURSE / MDM   LAB RESULTS:  No results found for this visit on 12/01/20. IMPRESSION: 78-year-old female presenting after a fall with neck and right upper back pain without loss of consciousness. Patient on [facility] for antiphospholipid syndrome. Vitals are stable, GCS 15. Patient is in c-collar. On physical exam, she has midline tenderness at the base of her neck and upper back, difficult to distinguish between paraspinal and bony pain at this time. She has tenderness to palpation along the entire right upper posterior chest wall. Cranial nerves intact, no focal neurological deficits. Patient is able to move right arm through full range of motion. Strength intact. Given patient's anticoagulation use, will obtain CT head, CT neck. We will also do chest x-ray to rule out pneumothorax. Patient given analgesic. RADIOLOGY:  Ct Head Wo Contrast    Result Date: 12/1/2020  EXAMINATION: CT OF THE HEAD WITHOUT CONTRAST  12/1/2020 1:48 pm TECHNIQUE: CT of the head was performed without the administration of intravenous contrast. Dose modulation, iterative reconstruction, and/or weight based adjustment of the mA/kV was utilized to reduce the radiation dose to as low as reasonably achievable. COMPARISON: 03/09/2019 HISTORY: ORDERING SYSTEM PROVIDED HISTORY: fall on AC FINDINGS: BRAIN/VENTRICLES: There is no acute intracranial hemorrhage, mass effect or midline shift. No abnormal extra-axial fluid collection. The gray-white differentiation is maintained without evidence of an acute infarct. There is no evidence of hydrocephalus.  ORBITS: The visualized Acute Type of Exam: Initial FINDINGS: Lower Chest: The visualized heart and lungs show no acute abnormalities. Organs: Cholecystectomy. The liver, spleen, pancreas, adrenal glands and kidneys enhance normally without significant abnormality. Note is made of some parapelvic left renal cysts. GI/Bowel: There is limited evaluation due to absence of oral contrast. Gastric bypass surgery noted. There is no evidence for bowel obstruction. Evaluation of the colon and small bowel show no significant focal lesions. Appendix not visualized however no secondary signs of acute appendicitis. Pelvis: The urinary bladder is unremarkable. Hysterectomy noted. A few surgical clips are scattered in the pelvis. Peritoneum/Retroperitoneum: No free fluid. No significant lymphadenopathy. IVC filter in place. Bones/Soft Tissues: No acute abnormality of the bones. Evidence for prior ventral hernia repair. No evidence for recurrent hernia. Some gas pockets in the subcutaneous fat mid anterior abdominal wall along with mild areas of infiltration may be related to subcutaneous medication administration. Similar findings on prior. 1. No acute infective or inflammatory process. 2. Evidence for prior ventral hernia repair. No evidence for recurrent hernia. 3. No bowel obstruction. Xr Chest Portable    Result Date: 12/1/2020  EXAMINATION: ONE XRAY VIEW OF THE CHEST 12/1/2020 1:45 pm COMPARISON: April 26, 2012 HISTORY: ORDERING SYSTEM PROVIDED HISTORY: fall, chest wall pain TECHNOLOGIST PROVIDED HISTORY: fall, chest wall pain Reason for Exam: port upright Acuity: Unknown FINDINGS: Lungs are clear. No cardiomegaly. No pulmonary edema. Negative chest radiograph.        EKG  n/a    All EKG's are interpreted by the Emergency Department Physician who either signs or Co-signs this chart in the absence of a cardiologist.    EMERGENCY DEPARTMENT COURSE:  ED Course as of Dec 01 1535   Tue Dec 01, 2020   1345 Patient evaluated for mis-transcribed.)       Omari Travis DO  Resident  12/01/20 [id number]

## 2020-12-01 NOTE — ED PROVIDER NOTES
FACULTY SIGN-OUT  ADDENDUM       Patient: Rafal Al   MRN: [id number]  PCP:  Belvin Cabot, MD  Attestation  I was available and discussed any additional care issues that arose and coordinated the management plans with the resident(s) caring for the patient during my duty period. Any areas of disagreement with resident's documentation of care or procedures are noted on the chart. I was personally present for the key portions of any/all procedures during my duty period. I have documented in the chart those procedures where I was not present during the key portions. The patient's initial evaluation and plan have been discussed with the prior provider who initially evaluated the patient. Pertinent Comments: The patient is a 38 y.o. female taken in signout with being status post mechanical fall with no loss of consciousness but on Plavix. We are awaiting CT head as well as CT C-spine and chest x-ray. CT head as well as CT C-spine negative for any acute traumatic sequelae as well as chest x-ray negative.       ED COURSE      The patient was given the following medications:  Orders Placed This Encounter   Medications    morphine injection 4 mg       RECENT VITALS:   BP: 120/85  Pulse: 118  Resp: 16  Temp: 97 °F (36.1 °C) SpO2: 94 %    (Please note that portions of this note were completed with a voice recognition program.  Efforts were made to edit the dictations but occasionally words are mis-transcribed.)    Elise Snellen, MD [facility]  Attending Emergency Medicine Physician       Elise Snellen, MD  12/01/20 [id number]

## 2020-12-01 NOTE — ED PROVIDER NOTES
[facility]     Emergency Department     Faculty Attestation    I performed a history and physical examination of the patient and discussed management with the resident. I have reviewed and agree with the residents findings including all diagnostic interpretations, and treatment plans as written at the time of my review. Any areas of disagreement are noted on the chart. I was personally present for the key portions of any procedures. I have documented in the chart those procedures where I was not present during the key portions. For Physician Assistant/ Nurse Practitioner cases/documentation I have personally evaluated this patient and have completed at least one if not all key elements of the E/M (history, physical exam, and MDM). Additional findings are as noted. This patient was evaluated in the Emergency Department for symptoms described in the history of present illness. The patient was evaluated in the context of the global COVID-19 pandemic, which necessitated consideration that the patient might be at risk for infection with the SARS-CoV-2 virus that causes COVID-19. Institutional protocols and algorithms that pertain to the evaluation of patients at risk for COVID-19 are in a state of rapid change based on information released by regulatory bodies including the CDC and federal and state organizations. These policies and algorithms were followed during the patient's care in the ED. Primary Care Physician: Alicia Marx MD    History: This is a 38 y.o. female who presents to the Emergency Department with complaint of fall, head, neck, shoulder pain. The patient slipped on ice falling backwards. She hit the head and neck on a ledge of a step. There was no reported loss consciousness. Patient denies any numbness, tingling or weakness. The patient is on anticoagulation of Arixtra.     Physical:   height is 5' 9\" (1.753 m) and weight is 230 lb (104.3 kg). Her oral temperature is 97 °F (36.1 °C). Her blood pressure is 120/85 and her pulse is 118. Her respiration is 16 and oxygen saturation is 94%. Patient does have some midline cervical spinal tenderness. Awake alert oriented x3. She has some tenderness in the right posterior back. Abdomen is soft nontender patient moves all extremities well with no focal neurological deficits. Impression: Fall, neck and back pain    Plan: As the patient is on anticoagulation CT scan of the head will be obtained. She has tenderness in the mid cervical spine CT scan of the cervical spine will also be obtained. Chest x-ray will be obtained as well. Patient will be given analgesia. MIPS [id number]     A head CT was ordered, but not by an emergency care provider: No    A head CT was ordered by an emergency care provider, and some of the indications for ordering the head CT included  Measure Exclusions:  Patient has a ventricular shunt: No  Patient has a brain tumor: No  Patient has multi-system trauma: No  Patient is pregnant: No  Patient is taking an antiplatelet medication (excluding aspirin): Yes  Patient is 72years old or older: No    (Please note that portions of this note were completed with a voice recognition program.  Efforts were made to edit the dictations but occasionally words are mis-transcribed.)    Mathew Setting.  Jabier Tovar MD, [facility]  Attending Emergency Medicine Physician        Goyo Talley MD  12/01/20 2019

## 2020-12-02 ENCOUNTER — APPOINTMENT (OUTPATIENT)
Dept: GENERAL RADIOLOGY | Age: 40
End: 2020-12-02
Payer: MEDICARE

## 2020-12-02 ENCOUNTER — TELEPHONE (OUTPATIENT)
Dept: FAMILY MEDICINE CLINIC | Age: 40
End: 2020-12-02

## 2020-12-02 ENCOUNTER — HOSPITAL ENCOUNTER (EMERGENCY)
Age: 40
Discharge: HOME | End: 2020-12-02
Attending: EMERGENCY MEDICINE
Payer: MEDICARE

## 2020-12-02 VITALS
SYSTOLIC BLOOD PRESSURE: 111 MMHG | DIASTOLIC BLOOD PRESSURE: 60 MMHG | RESPIRATION RATE: 18 BRPM | TEMPERATURE: 97.4 F | OXYGEN SATURATION: 99 % | HEART RATE: 97 BPM

## 2020-12-02 LAB
ABSOLUTE EOS #: 0.14 K/UL (ref 0–0.44)
ABSOLUTE IMMATURE GRANULOCYTE: <0.03 K/UL (ref 0–0.3)
ABSOLUTE LYMPH #: 2.07 K/UL (ref 1.1–3.7)
ABSOLUTE MONO #: 0.49 K/UL (ref 0.1–1.2)
ANION GAP SERPL CALCULATED.3IONS-SCNC: 13 MMOL/L (ref 9–17)
BASOPHILS # BLD: 1 % (ref 0–2)
BASOPHILS ABSOLUTE: 0.05 K/UL (ref 0–0.2)
BNP INTERPRETATION: NORMAL
BUN BLDV-MCNC: 18 MG/DL (ref 6–20)
BUN/CREAT BLD: ABNORMAL (ref 9–20)
CALCIUM SERPL-MCNC: 9.9 MG/DL (ref 8.6–10.4)
CHLORIDE BLD-SCNC: 102 MMOL/L (ref 98–107)
CO2: 25 MMOL/L (ref 20–31)
CREAT SERPL-MCNC: 0.69 MG/DL (ref 0.5–0.9)
DIFFERENTIAL TYPE: ABNORMAL
EOSINOPHILS RELATIVE PERCENT: 2 % (ref 1–4)
GFR AFRICAN AMERICAN: >60 ML/MIN
GFR NON-AFRICAN AMERICAN: >60 ML/MIN
GFR SERPL CREATININE-BSD FRML MDRD: ABNORMAL ML/MIN/{1.73_M2}
GFR SERPL CREATININE-BSD FRML MDRD: ABNORMAL ML/MIN/{1.73_M2}
GLUCOSE BLD-MCNC: 156 MG/DL (ref 70–99)
HCG QUALITATIVE: NEGATIVE
HCT VFR BLD CALC: 47.8 % (ref 36.3–47.1)
HEMOGLOBIN: 14.7 G/DL (ref 11.9–15.1)
IMMATURE GRANULOCYTES: 0 %
LYMPHOCYTES # BLD: 29 % (ref 24–43)
MCH RBC QN AUTO: 27.7 PG (ref 25.2–33.5)
MCHC RBC AUTO-ENTMCNC: 30.8 G/DL (ref 28.4–34.8)
MCV RBC AUTO: 90 FL (ref 82.6–102.9)
MONOCYTES # BLD: 7 % (ref 3–12)
NRBC AUTOMATED: 0 PER 100 WBC
PDW BLD-RTO: 14.1 % (ref 11.8–14.4)
PLATELET # BLD: ABNORMAL K/UL (ref 138–453)
PLATELET ESTIMATE: ABNORMAL
PLATELET, FLUORESCENCE: NORMAL K/UL (ref 138–453)
PLATELET, IMMATURE FRACTION: NORMAL % (ref 1.1–10.3)
PMV BLD AUTO: ABNORMAL FL (ref 8.1–13.5)
POTASSIUM SERPL-SCNC: 4.4 MMOL/L (ref 3.7–5.3)
PRO-BNP: <20 PG/ML
RBC # BLD: 5.31 M/UL (ref 3.95–5.11)
RBC # BLD: ABNORMAL 10*6/UL
SEG NEUTROPHILS: 62 % (ref 36–65)
SEGMENTED NEUTROPHILS ABSOLUTE COUNT: 4.49 K/UL (ref 1.5–8.1)
SODIUM BLD-SCNC: 140 MMOL/L (ref 135–144)
TROPONIN INTERP: NORMAL
TROPONIN T: NORMAL NG/ML
TROPONIN, HIGH SENSITIVITY: <6 NG/L (ref 0–14)
WBC # BLD: 7.3 K/UL (ref 3.5–11.3)
WBC # BLD: ABNORMAL 10*3/UL

## 2020-12-02 PROCEDURE — 85055 RETICULATED PLATELET ASSAY: CPT

## 2020-12-02 PROCEDURE — 99284 EMERGENCY DEPT VISIT MOD MDM: CPT

## 2020-12-02 PROCEDURE — 85025 COMPLETE CBC W/AUTO DIFF WBC: CPT

## 2020-12-02 PROCEDURE — 93970 EXTREMITY STUDY: CPT

## 2020-12-02 PROCEDURE — 83880 ASSAY OF NATRIURETIC PEPTIDE: CPT

## 2020-12-02 PROCEDURE — 80048 BASIC METABOLIC PNL TOTAL CA: CPT

## 2020-12-02 PROCEDURE — 93005 ELECTROCARDIOGRAM TRACING: CPT | Performed by: EMERGENCY MEDICINE

## 2020-12-02 PROCEDURE — 71046 X-RAY EXAM CHEST 2 VIEWS: CPT

## 2020-12-02 PROCEDURE — 84484 ASSAY OF TROPONIN QUANT: CPT

## 2020-12-02 PROCEDURE — 84703 CHORIONIC GONADOTROPIN ASSAY: CPT

## 2020-12-02 RX ORDER — ACETAMINOPHEN 325 MG/1
650 TABLET ORAL ONCE
Status: DISCONTINUED | OUTPATIENT
Start: 2020-12-02 | End: 2020-12-02 | Stop reason: HOSPADM

## 2020-12-02 ASSESSMENT — ENCOUNTER SYMPTOMS
COUGH: 0
RHINORRHEA: 0
VOMITING: 0
SHORTNESS OF BREATH: 1
NAUSEA: 0
DIARRHEA: 0
SORE THROAT: 0
BACK PAIN: 0
ABDOMINAL PAIN: 0

## 2020-12-02 NOTE — ED NOTES
Patricia Durbin EMT-P at bedside for ultrasound IV attempt.      Claudette Brookes, RN  12/02/20 [id number]

## 2020-12-02 NOTE — ED PROVIDER NOTES
[facility]  ED  EMERGENCY DEPARTMENT ENCOUNTER    Pt Name: Mikala Trinh  MRN: [id number]  Birthdate1980  Date of evaluation: 12/2/2020      CHIEF COMPLAINT       Chief Complaint   Patient presents with    Leg Pain     c/o bilateral calf pain worse on the right x3 days    Chest Pain    Shortness of Breath         HISTORY OF Brea 13 is a 38 y.o. female who presents bilateral lower leg pain. History of blood clots. States this is been ongoing for several days. She was actually here yesterday after a fall did not mention this because she was more concerned about her fall. Denies chest pain or shortness of breath worse than her baseline but she is concerned about that as well. States she is compliant with her anticoagulation. Thinks her last clot was approximately a year ago but she is not entirely sure. Minimal relief with Tylenol and Motrin at home but has not taken anything today. No recent illnesses no fever no change in smell or taste        REVIEW OF SYSTEMS       Review of Systems   Constitutional: Negative for chills and fever. HENT: Negative for rhinorrhea and sore throat. Eyes: Negative for visual disturbance. Respiratory: Positive for shortness of breath. Negative for cough. Cardiovascular: Positive for chest pain and leg swelling. Gastrointestinal: Negative for abdominal pain, diarrhea, nausea and vomiting. Genitourinary: Negative for difficulty urinating. Musculoskeletal: Negative for back pain and neck pain. Skin: Negative for rash. Neurological: Negative for headaches.        PAST MEDICAL HISTORY    has a past medical history of Alcohol abuse, Anxiety, Arthritis, Painting esophagus, Benzodiazepine overdose, Bipolar disorder, unspecified ([address] Bipolar I disorder, most recent episode depressed, severe without psychotic features ([address] Cellulitis, Chronic abdominal wound infection, Constipation, Depression, Drug into the muscle once for 1 dose    CYANOCOBALAMIN 1000 MCG/ML INJECTION    ADMINISTER 1 ML IN THE MUSCLE EVERY 7 DAYS    DICYCLOMINE (BENTYL) 10 MG CAPSULE    Take 1 capsule by mouth every 6 hours as needed (cramps)    DILTIAZEM (DILTIAZEM CD) 240 MG EXTENDED RELEASE CAPSULE    Take 240 mg by mouth daily Take one capsule by mouth daily. FAMOTIDINE (PEPCID) 20 MG TABLET    Take 1 tablet by mouth 2 times daily    FARXIGA 10 MG TABLET    TAKE 1 TABLET BY MOUTH EVERY MORNING    FONDAPARINUX (ARIXTRA) 10 MG/0.8ML SOLN INJECTION    Inject 0.8 mLs into the skin daily    GLUCAGEN HYPOKIT 1 MG SOLR INJECTION        GLUCOSE MONITORING KIT (FREESTYLE) MONITORING KIT    Testing twice a day. Please dispense according to patients insurance. INSULIN PEN NEEDLE 31G X 5 MM MISC    1 each by Does not apply route daily    KLOR-CON M10 10 MEQ EXTENDED RELEASE TABLET    Take 2 tablets by mouth daily as needed (take with bumex)    LAMOTRIGINE (LAMICTAL) 200 MG TABLET    Take 200 mg by mouth daily    LANCETS 30G MISC    Testing twice a day. Please dispense according to patients insurance. LORAZEPAM (ATIVAN) 2 MG TABLET        MULTIPLE VITAMINS-MINERALS (THERAPEUTIC MULTIVITAMIN-MINERALS) TABLET    Take 1 tablet by mouth daily    ONDANSETRON (ZOFRAN ODT) 4 MG DISINTEGRATING TABLET    Take 1 tablet by mouth every 8 hours as needed for Nausea    ONDANSETRON (ZOFRAN) 4 MG TABLET    Take 1 tablet by mouth 3 times daily as needed for Nausea or Vomiting    ONDANSETRON (ZOFRAN-ODT) 4 MG DISINTEGRATING TABLET    DISSOLVE ONE TABLET BY MOUTH THREE TIMES DAILY AS NEEDED FOR NAUSEA AND VOMITING    PRAVASTATIN (PRAVACHOL) 40 MG TABLET    TAKE 1 TABLET(40 MG) BY MOUTH DAILY    PREGABALIN (LYRICA) 50 MG CAPSULE    Take 1 capsule by mouth 3 times daily for 7 days.     QUETIAPINE (SEROQUEL) 100 MG TABLET    Take 50 mg by mouth nightly as needed     SUCRALFATE (CARAFATE) 1 GM TABLET    DISSOLVE 1 TABLET INTO 15 ML( 1 TABLESPOON) OF WATER AND DRINK BY MOUTH FOUR TIMES DAILY AS NEEDED FOR GERD    SYRINGE/NEEDLE, DISP, (SYRINGE 3CC/25GX1\") 25G X 1\" 3 ML MISC    To be used with B12 injections monthly    TIZANIDINE (ZANAFLEX) 4 MG TABLET    TAKE 1 TABLET BY MOUTH TWICE DAILY AS NEEDED FOR BACK PAIN    TOUJEO SOLOSTAR 300 UNIT/ML SOPN    INJECT 70 UNITS INTO THE SKIN EVERY MORNING    VALACYCLOVIR (VALTREX) 500 MG TABLET    Take 1 tablet by mouth daily    VITAMIN D (CHOLECALCIFEROL) 1000 UNIT TABS TABLET    Take 10,000 Units by mouth daily 5 days a week       ALLERGIES     is allergic to asa [aspirin]; betadine [povidone iodine]; celexa [citalopram hydrobromide]; citalopram; lasix [furosemide]; macrobid [nitrofurantoin]; norco [hydrocodone-acetaminophen]; betadine [povidone iodine]; codeine; lithium; paroxetine; paxil [paroxetine hcl]; tape [adhesive tape]; and tegretol [carbamazepine]. FAMILY HISTORY     She indicated that her mother is alive. She indicated that her father is alive. She indicated that the status of her maternal aunt is unknown. She indicated that the status of her maternal uncle is unknown. She indicated that the status of her maternal cousin is unknown.     family history includes Breast Cancer in her maternal aunt and maternal cousin; Cancer in her maternal uncle; Depression in her mother; Diabetes in her father; Heart Disease in her father; High Blood Pressure in her father and mother; High Cholesterol in her father and mother; Kidney Disease in her father. SOCIAL HISTORY      reports that she has never smoked. She has never used smokeless tobacco. She reports that she does not drink alcohol or use drugs. PHYSICAL EXAM     INITIAL VITALS:  infrared temperature is 97.4 °F (36.3 °C). Her blood pressure is 111/60 and her pulse is 97. Her respiration is 18 and oxygen saturation is 99%. Physical Exam  Constitutional:       General: She is not in acute distress. HENT:      Head: Normocephalic and atraumatic.    Eyes:      General: No scleral icterus. Pupils: Pupils are equal, round, and reactive to light. Neck:      Musculoskeletal: Normal range of motion and neck supple. Cardiovascular:      Rate and Rhythm: Normal rate and regular rhythm. Heart sounds: Normal heart sounds. No murmur. No friction rub. No gallop. Pulmonary:      Effort: Pulmonary effort is normal. No respiratory distress. Breath sounds: Normal breath sounds. Abdominal:      General: There is no distension. Palpations: Abdomen is soft. Tenderness: There is no abdominal tenderness. There is no guarding or rebound. Musculoskeletal: Normal range of motion. Right lower leg: No edema. Left lower leg: No edema. Comments: Bilateral calf tenderness nonfocal no asymmetry no edema no palpable cords distal pulses intact. No focal bony tenderness in the lower extremities full range of motion hips knees and ankles bilaterally   Skin:     General: Skin is warm and dry. Capillary Refill: Capillary refill takes less than 2 seconds. Neurological:      Mental Status: She is alert and oriented to person, place, and time. Gait: Gait normal.         DIFFERENTIAL DIAGNOSIS/ MDM:     Given history of clots even though anticoagulated will order DVT studies. Will check labs EKG x-ray as well given chest pain shortness of breath. However given patient states that this is typical for her not worse will hold on need for PE study    DIAGNOSTIC RESULTS     EKG: All EKG's are interpreted by the Emergency Department Physician who either signs or Co-signs this chart in the [address] a cardiologist.    Sinus rhythm 99. Normal intervals normal axis no acute ST or T changes. Normal EKG    RADIOLOGY:   I directly visualized the following  images and reviewed theradiologist interpretations:  XR CHEST (2 VW)   Final Result   No acute airspace disease identified.          VL DUP LOWER EXTREMITY VENOUS BILATERAL    (Results Pending)           ED BEDSIDE ULTRASOUND:   none    LABS:  Labs Reviewed   BASIC METABOLIC PANEL - Abnormal; Notable for the following components:       Result Value    Glucose 156 (*)     All other components within normal limits   CBC WITH AUTO DIFFERENTIAL - Abnormal; Notable for the following components:    RBC 5.31 (*)     Hematocrit 47.8 (*)     All other components within normal limits   BRAIN NATRIURETIC PEPTIDE   HCG, SERUM, QUALITATIVE   TROPONIN   IMMATURE PLATELET FRACTION   TROPONIN           EMERGENCY DEPARTMENT COURSE:   Vitals:    Vitals:    12/02/20 1056 12/02/20 1109 12/02/20 1112   BP:  111/60    Pulse:   97   Resp:   18   Temp: 97.4 °F (36.3 °C)     TempSrc: Infrared     SpO2:  98% 99%     -------------------------  BP: 111/60, Temp: 97.4 °F (36.3 °C), Pulse: 97, Resp: 18    1:22 PM EST  Verbal report from ultrasound tech DVT study negative bilaterally. Given this will discharge home. Patient understands warts follow-up with PCP    CRITICAL CARE:     none    CONSULTS:  None    PROCEDURES:  None    FINAL IMPRESSION      1. Pain in both lower extremities          DISPOSITION/PLAN       PATIENT REFERRED TO:  Irasema Pinto MD  [address] [place] [address].  [address]  [address] [address] [facility]  [phone]    In 2 days        DISCHARGE MEDICATIONS:  New Prescriptions    No medications on file       (Please note that portions of this note were completed with a voice recognition program.  Efforts were made to edit the dictations butoccasionally words are mis-transcribed. )    Radha Salas MD  Attending Emergency Physician                   Radha Salas MD  12/02/20 2355

## 2020-12-02 NOTE — TELEPHONE ENCOUNTER
[facility] ([facility]) ED Follow up Call          FU appts/Provider:    Future Appointments   Date Time Provider Maria Luisa Walker   12/29/2020  1:00 PM Alvaro Wall, BERNARDA - CNP fp sc [id number]       VOICEMAIL DOCUMENTATION - ERASE IF NOT USED  Hi, this message is for Malik Montes  This is Kena from [address] [place] [address] office. Just calling to see how you are doing after your recent visit to the Emergency Room. [address] [place] [address] wants to make sure you were able to fill any prescriptions and that you understand your discharge instructions. Please return our call if you need to make a follow up appointment with your provider or have any further needs. Our phone number is [phone]. Have a great day.

## 2020-12-02 NOTE — ED NOTES
Doppler tech at bedside      Lalo [place], [facility]  12/02/20 1745

## 2020-12-03 ENCOUNTER — TELEPHONE (OUTPATIENT)
Dept: FAMILY MEDICINE CLINIC | Age: 40
End: 2020-12-03

## 2020-12-03 LAB
EKG ATRIAL RATE: 99 BPM
EKG P AXIS: 53 DEGREES
EKG P-R INTERVAL: 156 MS
EKG Q-T INTERVAL: 376 MS
EKG QRS DURATION: 88 MS
EKG QTC CALCULATION (BAZETT): 482 MS
EKG R AXIS: 33 DEGREES
EKG T AXIS: 19 DEGREES
EKG VENTRICULAR RATE: 99 BPM

## 2020-12-04 RX ORDER — ACYCLOVIR 50 MG/G
OINTMENT TOPICAL
Qty: 15 G | Refills: 0 | Status: SHIPPED | OUTPATIENT
Start: 2020-12-04 | End: 2021-01-05

## 2020-12-15 ENCOUNTER — APPOINTMENT (OUTPATIENT)
Dept: GENERAL RADIOLOGY | Age: 40
End: 2020-12-15
Payer: MEDICARE

## 2020-12-15 ENCOUNTER — HOSPITAL ENCOUNTER (EMERGENCY)
Age: 40
Discharge: HOME | End: 2020-12-15
Attending: EMERGENCY MEDICINE
Payer: MEDICARE

## 2020-12-15 VITALS
RESPIRATION RATE: 16 BRPM | SYSTOLIC BLOOD PRESSURE: 177 MMHG | WEIGHT: 280 LBS | HEART RATE: 82 BPM | HEIGHT: 68 IN | DIASTOLIC BLOOD PRESSURE: 89 MMHG | BODY MASS INDEX: 42.44 KG/M2 | TEMPERATURE: 97.3 F | OXYGEN SATURATION: 99 %

## 2020-12-15 PROCEDURE — 96372 THER/PROPH/DIAG INJ SC/IM: CPT

## 2020-12-15 PROCEDURE — 99283 EMERGENCY DEPT VISIT LOW MDM: CPT

## 2020-12-15 PROCEDURE — 6360000002 HC RX W HCPCS: Performed by: STUDENT IN AN ORGANIZED HEALTH CARE EDUCATION/TRAINING PROGRAM

## 2020-12-15 PROCEDURE — 71046 X-RAY EXAM CHEST 2 VIEWS: CPT

## 2020-12-15 PROCEDURE — 72070 X-RAY EXAM THORAC SPINE 2VWS: CPT

## 2020-12-15 RX ORDER — ORPHENADRINE CITRATE 30 MG/ML
60 INJECTION INTRAMUSCULAR; INTRAVENOUS ONCE
Status: COMPLETED | OUTPATIENT
Start: 2020-12-15 | End: 2020-12-15

## 2020-12-15 RX ADMIN — ORPHENADRINE CITRATE 60 MG: 60 INJECTION INTRAMUSCULAR; INTRAVENOUS at 17:40

## 2020-12-15 ASSESSMENT — PAIN DESCRIPTION - FREQUENCY: FREQUENCY: CONTINUOUS

## 2020-12-15 ASSESSMENT — ENCOUNTER SYMPTOMS
NAUSEA: 0
CHEST TIGHTNESS: 0
RHINORRHEA: 0
SHORTNESS OF BREATH: 0
VOMITING: 0
CONSTIPATION: 0
DIARRHEA: 0
ABDOMINAL PAIN: 0
BACK PAIN: 1

## 2020-12-15 ASSESSMENT — PAIN DESCRIPTION - ORIENTATION: ORIENTATION: MID

## 2020-12-15 ASSESSMENT — PAIN DESCRIPTION - PAIN TYPE: TYPE: ACUTE PAIN

## 2020-12-15 ASSESSMENT — PAIN SCALES - GENERAL: PAINLEVEL_OUTOF10: 8

## 2020-12-15 ASSESSMENT — PAIN DESCRIPTION - DESCRIPTORS: DESCRIPTORS: RADIATING;SHARP

## 2020-12-15 ASSESSMENT — PAIN DESCRIPTION - LOCATION: LOCATION: BACK
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
## 2020-12-16 ENCOUNTER — TELEPHONE (OUTPATIENT)
Dept: FAMILY MEDICINE CLINIC | Age: 40
End: 2020-12-16

## 2020-12-17 RX ORDER — TIZANIDINE 4 MG/1
TABLET ORAL
Qty: 180 TABLET | Refills: 0 | Status: SHIPPED | OUTPATIENT
Start: 2020-12-17 | End: 2020-12-27

## 2020-12-27 RX ORDER — TIZANIDINE 4 MG/1
TABLET ORAL
Qty: 180 TABLET | Refills: 0 | Status: SHIPPED | OUTPATIENT
Start: 2020-12-27 | End: 2022-06-02

## 2020-12-27 NOTE — TELEPHONE ENCOUNTER
Please Approve or Refuse.   Send to Pharmacy per Pt's Request:      Next Visit Date:  12/29/2020   Last Visit Date: 11/27/2020    Hemoglobin A1C (%)   Date Value   12/20/2019 9.1   11/20/2018 9.6 (H)   01/30/2018 10.2             ( goal A1C is < 7)   BP Readings from Last 3 Encounters:   12/15/20 (!) 177/89   12/02/20 111/60   12/01/20 120/85          (goal 120/80)  BUN   Date Value Ref Range Status   12/02/2020 18 6 - 20 mg/dL Final     CREATININE   Date Value Ref Range Status   12/02/2020 0.69 0.50 - 0.90 mg/dL Final     Potassium   Date Value Ref Range Status   12/02/2020 4.4 3.7 - 5.3 mmol/L Final

## 2021-01-05 DIAGNOSIS — B00.9 HERPES SIMPLEX TYPE II INFECTION: ICD-10-CM

## 2021-01-05 RX ORDER — ACYCLOVIR 50 MG/G
OINTMENT TOPICAL
Qty: 15 G | Refills: 0 | Status: SHIPPED | OUTPATIENT
Start: 2021-01-05 | End: 2021-03-30 | Stop reason: SDUPTHER

## 2021-01-05 NOTE — TELEPHONE ENCOUNTER
Please Approve or Refuse.   Send to Pharmacy per Pt's Request: Next Visit Date:  Visit date not found   Last Visit Date: 11/27/2020    Hemoglobin A1C (%)   Date Value   12/20/2019 9.1   11/20/2018 9.6 (H)   01/30/2018 10.2             ( goal A1C is < 7)   BP Readings from Last 3 Encounters:   12/15/20 (!) 177/89   12/02/20 111/60   12/01/20 120/85          (goal 120/80)  BUN   Date Value Ref Range Status   12/02/2020 18 6 - 20 mg/dL Final     CREATININE   Date Value Ref Range Status   12/02/2020 0.69 0.50 - 0.90 mg/dL Final     Potassium   Date Value Ref Range Status   12/02/2020 4.4 3.7 - 5.3 mmol/L Final

## 2021-01-05 NOTE — TELEPHONE ENCOUNTER
Patient needs appointment for diabetes with any provider    Lab Results   Component Value Date    LABA1C 9.1 12/20/2019    LABA1C 9.6 (H) 11/20/2018    LABA1C 10.2 01/30/2018   \

## 2021-01-07 DIAGNOSIS — Z86.711 HISTORY OF PULMONARY EMBOLUS (PE): ICD-10-CM

## 2021-01-07 DIAGNOSIS — Z86.718 HISTORY OF DVT OF LOWER EXTREMITY: ICD-10-CM

## 2021-01-07 DIAGNOSIS — K90.89 OTHER SPECIFIED INTESTINAL MALABSORPTION: ICD-10-CM

## 2021-01-07 DIAGNOSIS — D68.61 ANTIPHOSPHOLIPID SYNDROME (HCC): ICD-10-CM

## 2021-01-07 DIAGNOSIS — Z98.84 HISTORY OF ROUX-EN-Y GASTRIC BYPASS: Chronic | ICD-10-CM

## 2021-01-07 RX ORDER — FONDAPARINUX SODIUM 10 MG/.8ML
10 INJECTION SUBCUTANEOUS DAILY
Qty: 24 ML | Refills: 11 | OUTPATIENT
Start: 2021-01-07

## 2021-01-08 ENCOUNTER — PATIENT MESSAGE (OUTPATIENT)
Dept: FAMILY MEDICINE CLINIC | Age: 41
End: 2021-01-08

## 2021-01-08 DIAGNOSIS — D68.61 ANTIPHOSPHOLIPID SYNDROME (HCC): ICD-10-CM

## 2021-01-08 DIAGNOSIS — Z86.711 HISTORY OF PULMONARY EMBOLUS (PE): ICD-10-CM

## 2021-01-08 DIAGNOSIS — K90.89 OTHER SPECIFIED INTESTINAL MALABSORPTION: ICD-10-CM

## 2021-01-08 DIAGNOSIS — Z98.84 HISTORY OF ROUX-EN-Y GASTRIC BYPASS: Chronic | ICD-10-CM

## 2021-01-08 DIAGNOSIS — Z86.718 HISTORY OF DVT OF LOWER EXTREMITY: ICD-10-CM

## 2021-01-08 RX ORDER — FONDAPARINUX SODIUM 10 MG/.8ML
10 INJECTION SUBCUTANEOUS DAILY
Qty: 24 ML | Refills: 0 | Status: SHIPPED | OUTPATIENT
Start: 2021-01-08 | End: 2021-02-22 | Stop reason: SDUPTHER

## 2021-01-08 NOTE — TELEPHONE ENCOUNTER
From: Kristy Lopez  To: Welby Schilder, MD  Sent: 1/8/2021 10:35 AM EST  Subject: Prescription Question    I am waiting for refills of my Arixtra to be called into [address] [facility] [place] on [facility]. If that could be addressed today I am almost out of my shots.   Thank you so much,   AUTUMN

## 2021-01-18 ENCOUNTER — E-VISIT (OUTPATIENT)
Dept: FAMILY MEDICINE CLINIC | Age: 41
End: 2021-01-18
Payer: MEDICARE

## 2021-01-18 DIAGNOSIS — H10.31 ACUTE BACTERIAL CONJUNCTIVITIS OF RIGHT EYE: Primary | ICD-10-CM

## 2021-01-18 PROCEDURE — 99421 OL DIG E/M SVC 5-10 MIN: CPT | Performed by: FAMILY MEDICINE

## 2021-01-18 RX ORDER — OFLOXACIN 3 MG/ML
1 SOLUTION/ DROPS OPHTHALMIC 4 TIMES DAILY
Qty: 1 BOTTLE | Refills: 0 | Status: SHIPPED | OUTPATIENT
Start: 2021-01-18 | End: 2021-01-28

## 2021-01-18 NOTE — PROGRESS NOTES
HPI: per patient's questionnaire    EXAM: not applicable    Diagnoses and all orders for this visit:    1. Acute bacterial conjunctivitis of right eye  worsening    - ofloxacin (OCUFLOX) 0.3 % solution; Place 1 drop into the right eye 4 times daily for 10 days  Dispense: 1 Bottle; Refill: 0      Patient was advised to contact PCP if symptoms worsen or failing to change as expected    5-10 minutes were spent on the digital evaluation and management of this patient.

## 2021-02-19 ENCOUNTER — TELEPHONE (OUTPATIENT)
Dept: FAMILY MEDICINE CLINIC | Age: 41
End: 2021-02-19

## 2021-02-19 NOTE — TELEPHONE ENCOUNTER
Patient needs appointment for diabetes    No future appointments.   Lab Results   Component Value Date    LABA1C 9.1 12/20/2019    LABA1C 9.6 (H) 11/20/2018    LABA1C 10.2 01/30/2018

## 2021-02-22 ENCOUNTER — PATIENT MESSAGE (OUTPATIENT)
Dept: FAMILY MEDICINE CLINIC | Age: 41
End: 2021-02-22

## 2021-02-22 DIAGNOSIS — K90.89 OTHER SPECIFIED INTESTINAL MALABSORPTION: ICD-10-CM

## 2021-02-22 DIAGNOSIS — Z86.711 HISTORY OF PULMONARY EMBOLUS (PE): ICD-10-CM

## 2021-02-22 DIAGNOSIS — Z98.84 HISTORY OF ROUX-EN-Y GASTRIC BYPASS: Chronic | ICD-10-CM

## 2021-02-22 DIAGNOSIS — D68.61 ANTIPHOSPHOLIPID SYNDROME (HCC): ICD-10-CM

## 2021-02-22 DIAGNOSIS — U07.1 COVID-19 VIRUS INFECTION: Primary | ICD-10-CM

## 2021-02-22 DIAGNOSIS — Z86.718 HISTORY OF DVT OF LOWER EXTREMITY: ICD-10-CM

## 2021-02-22 RX ORDER — FONDAPARINUX SODIUM 10 MG/.8ML
10 INJECTION SUBCUTANEOUS DAILY
Qty: 24 ML | Refills: 0 | Status: SHIPPED | OUTPATIENT
Start: 2021-02-22 | End: 2021-03-30 | Stop reason: SDUPTHER

## 2021-02-22 NOTE — TELEPHONE ENCOUNTER
From: Jessica Gurrola  To: Kvng Carter MD  Sent: 2/22/2021 1:24 PM EST  Subject: Prescription Question    I need to have my fondaparinux refilled and am unable to make an appointment at this time. I was tested for COVID Feb 21st and it was positive. If you could please call it in to   [facility] on [address]  Thank you and I will schedule an appointment for when I'm no longer in quarantine.

## 2021-03-30 DIAGNOSIS — Z86.711 HISTORY OF PULMONARY EMBOLUS (PE): ICD-10-CM

## 2021-03-30 DIAGNOSIS — Z79.4 TYPE 2 DIABETES MELLITUS WITH DIABETIC POLYNEUROPATHY, WITH LONG-TERM CURRENT USE OF INSULIN (HCC): ICD-10-CM

## 2021-03-30 DIAGNOSIS — B00.9 HERPES SIMPLEX TYPE II INFECTION: ICD-10-CM

## 2021-03-30 DIAGNOSIS — K90.89 OTHER SPECIFIED INTESTINAL MALABSORPTION: ICD-10-CM

## 2021-03-30 DIAGNOSIS — Z79.01 CHRONIC ANTICOAGULATION: Primary | ICD-10-CM

## 2021-03-30 DIAGNOSIS — Z98.84 HISTORY OF ROUX-EN-Y GASTRIC BYPASS: Chronic | ICD-10-CM

## 2021-03-30 DIAGNOSIS — I10 ESSENTIAL HYPERTENSION: ICD-10-CM

## 2021-03-30 DIAGNOSIS — H10.31 ACUTE BACTERIAL CONJUNCTIVITIS OF RIGHT EYE: ICD-10-CM

## 2021-03-30 DIAGNOSIS — Z86.718 HISTORY OF DVT OF LOWER EXTREMITY: ICD-10-CM

## 2021-03-30 DIAGNOSIS — K21.00 GASTROESOPHAGEAL REFLUX DISEASE WITH ESOPHAGITIS: ICD-10-CM

## 2021-03-30 DIAGNOSIS — D68.61 ANTIPHOSPHOLIPID SYNDROME (HCC): ICD-10-CM

## 2021-03-30 DIAGNOSIS — E11.42 TYPE 2 DIABETES MELLITUS WITH DIABETIC POLYNEUROPATHY, WITH LONG-TERM CURRENT USE OF INSULIN (HCC): ICD-10-CM

## 2021-03-30 RX ORDER — FONDAPARINUX SODIUM 10 MG/.8ML
10 INJECTION SUBCUTANEOUS DAILY
Qty: 24 ML | Refills: 5 | Status: ON HOLD | OUTPATIENT
Start: 2021-03-30 | End: 2022-06-12 | Stop reason: HOSPADM

## 2021-03-30 RX ORDER — INSULIN GLARGINE 300 U/ML
INJECTION, SOLUTION SUBCUTANEOUS
Qty: 12 ML | Refills: 0 | Status: SHIPPED | OUTPATIENT
Start: 2021-03-30 | End: 2022-06-02 | Stop reason: DRUGHIGH

## 2021-03-30 RX ORDER — ACYCLOVIR 50 MG/G
OINTMENT TOPICAL
Qty: 15 G | Refills: 0 | Status: SHIPPED | OUTPATIENT
Start: 2021-03-30 | End: 2022-06-02

## 2021-03-30 RX ORDER — FAMOTIDINE 20 MG/1
TABLET, FILM COATED ORAL
Qty: 60 TABLET | Refills: 0 | Status: SHIPPED | OUTPATIENT
Start: 2021-03-30 | End: 2021-04-22

## 2021-03-30 RX ORDER — BENAZEPRIL HYDROCHLORIDE 20 MG/1
20 TABLET ORAL DAILY
Qty: 90 TABLET | Refills: 0 | Status: SHIPPED | OUTPATIENT
Start: 2021-03-30 | End: 2021-07-21

## 2021-03-30 RX ORDER — OFLOXACIN 3 MG/ML
SOLUTION/ DROPS OPHTHALMIC
Qty: 5 ML | Refills: 0 | OUTPATIENT
Start: 2021-03-30

## 2021-03-30 NOTE — TELEPHONE ENCOUNTER
Please Approve or Refuse.   Send to Pharmacy per Pt's Request:     Next Visit Date:  Visit date not found   Last Visit Date: 3/12/2021    Hemoglobin A1C (%)   Date Value   12/20/2019 9.1   11/20/2018 9.6 (H)   01/30/2018 10.2             ( goal A1C is < 7)   BP Readings from Last 3 Encounters:   12/15/20 (!) 177/89   12/02/20 111/60   12/01/20 120/85          (goal 120/80)  BUN   Date Value Ref Range Status   12/02/2020 18 6 - 20 mg/dL Final     CREATININE   Date Value Ref Range Status   12/02/2020 0.69 0.50 - 0.90 mg/dL Final     Potassium   Date Value Ref Range Status   12/02/2020 4.4 3.7 - 5.3 mmol/L Final

## 2021-03-30 NOTE — TELEPHONE ENCOUNTER
SPOKE WITH PATIENT REGARDING APPOINTMENT I DID SET UP x2 APPOINTMENTS ONE WITH ERICA AND ONE WITH YOU PER OPAL  VERBALIZED UNDERSTANDING

## 2021-04-15 ENCOUNTER — APPOINTMENT (OUTPATIENT)
Dept: GENERAL RADIOLOGY | Age: 41
End: 2021-04-15
Payer: MEDICARE

## 2021-04-15 ENCOUNTER — HOSPITAL ENCOUNTER (EMERGENCY)
Age: 41
Discharge: HOME | End: 2021-04-15
Attending: EMERGENCY MEDICINE
Payer: MEDICARE

## 2021-04-15 VITALS
TEMPERATURE: 98.6 F | OXYGEN SATURATION: 100 % | DIASTOLIC BLOOD PRESSURE: 100 MMHG | HEART RATE: 78 BPM | RESPIRATION RATE: 17 BRPM | SYSTOLIC BLOOD PRESSURE: 159 MMHG | WEIGHT: 285 LBS | HEIGHT: 68 IN | BODY MASS INDEX: 43.19 KG/M2

## 2021-04-15 DIAGNOSIS — S50.12XA CONTUSION OF LEFT FOREARM, INITIAL ENCOUNTER: Primary | ICD-10-CM

## 2021-04-15 PROCEDURE — 99283 EMERGENCY DEPT VISIT LOW MDM: CPT

## 2021-04-15 PROCEDURE — 73090 X-RAY EXAM OF FOREARM: CPT

## 2021-04-15 PROCEDURE — 6370000000 HC RX 637 (ALT 250 FOR IP): Performed by: STUDENT IN AN ORGANIZED HEALTH CARE EDUCATION/TRAINING PROGRAM

## 2021-04-15 RX ORDER — ACETAMINOPHEN 500 MG
1000 TABLET ORAL ONCE
Status: COMPLETED | OUTPATIENT
Start: 2021-04-15 | End: 2021-04-15

## 2021-04-15 RX ADMIN — ACETAMINOPHEN 1000 MG: 500 TABLET ORAL at 20:16

## 2021-04-15 ASSESSMENT — ENCOUNTER SYMPTOMS
BACK PAIN: 0
VOMITING: 0
ABDOMINAL PAIN: 0
NAUSEA: 0
SHORTNESS OF BREATH: 0
PHOTOPHOBIA: 0

## 2021-04-15 ASSESSMENT — PAIN DESCRIPTION - ORIENTATION: ORIENTATION: LEFT

## 2021-04-15 ASSESSMENT — PAIN DESCRIPTION - LOCATION: LOCATION: ARM

## 2021-04-15 ASSESSMENT — PAIN DESCRIPTION - FREQUENCY: FREQUENCY: CONTINUOUS

## 2021-04-15 ASSESSMENT — PAIN SCALES - GENERAL
PAINLEVEL_OUTOF10: 7
PAINLEVEL_OUTOF10: 7

## 2021-04-15 NOTE — ED TRIAGE NOTES
Pt to ER with right arm pain s/p mechanical fall this morning. -LOC, pt denies head or neck pain. No obvious deformity.  Pt alert and oriented, VSS, NAD in triage

## 2021-04-15 NOTE — ED PROVIDER NOTES
[facility] ED  Emergency Department Encounter  EmergencyMedicine Resident     Pt Nitesh Moore  MRN: [id number]  [place] 1980  Date of evaluation: 4/15/21  PCP:  Paul Mccarthy MD    [address] [place] [address]       Chief Complaint   Patient presents with    Arm Pain     left forearm       HISTORY OF PRESENT ILLNESS  (Location/Symptom, Timing/Onset, Context/Setting, Quality, Duration, Modifying Factors, Severity.)      Martin Simmons is a 38 y.o. female who presents with double left forearm pain status post fall at approximately 2:30 AM this morning. Patient states that he is just clumsy nd struck her forearm on the countertop. She denies any prodromal symptoms denies hitting her head or losing consciousness or having any other injuries anywhere she has been taking Tylenol. She has a history of antiphospholipid syndrome and is unable to take NSAIDs. Complaining of worsening pain throughout the day and is presenting here for evaluation. Is right-hand dominant.     PAST MEDICAL / SURGICAL / SOCIAL / FAMILY HISTORY      has a past medical history of Alcohol abuse, Anxiety, Arthritis, Painting esophagus, Benzodiazepine overdose, Bipolar disorder, unspecified ([address] Bipolar I disorder, most recent episode depressed, severe without psychotic features ([address] Cellulitis, Chronic abdominal wound infection, Constipation, Depression, Drug overdose, intentional ([address] Genital herpes, unspecified, GERD (gastroesophageal reflux disease), History of pulmonary embolism, Hx of blood clots, Hypertension, Iron deficiency anemia, Isopropyl alcohol poisoning, Lumbosacral spondylosis without myelopathy, MDRO (multiple drug resistant organisms) resistance, Miscarriage, Morbid obesity ([address] MRSA (methicillin resistant staph aureus) culture positive, Overdose of benzodiazepine, Pernicious anemia, Primary hypercoagulable state ([address] Pulmonary embolism ([address] Suicidal behavior, Suicidal ideation, Suicide of current or ex partner: Not on file     Emotionally abused: Not on file     Physically abused: Not on file     Forced sexual activity: Not on file   Other Topics Concern    Not on file   Social History Narrative    Lives with James Fischer ex  and daughter            Family History   Problem Relation Age of Onset    Depression Mother     High Blood Pressure Mother     High Cholesterol Mother     Diabetes Father     Heart Disease Father     Kidney Disease Father     High Blood Pressure Father     High Cholesterol Father     Cancer Maternal Uncle         of duodenum had whipple    Breast Cancer Maternal Aunt     Breast Cancer Maternal Cousin        Allergies:  Asa [aspirin], Betadine [povidone iodine], Celexa [citalopram hydrobromide], Citalopram, Lasix [furosemide], Macrobid [nitrofurantoin], Norco [hydrocodone-acetaminophen], Betadine [povidone iodine], Codeine, Lithium, Paroxetine, Paxil [paroxetine hcl], Tape [adhesive tape], and Tegretol [carbamazepine]    Home Medications:  Prior to Admission medications    Medication Sig Start Date End Date Taking? Authorizing Provider   benazepril (LOTENSIN) 20 MG tablet TAKE 1 TABLET BY MOUTH DAILY 3/30/21   MD MISHA Saleem SOLOSTAR 300 UNIT/ML SOPN INJECT 70 UNITS INTO THE SKIN EVERY MORNING 3/30/21   Carol Rossi MD   famotidine (PEPCID) 20 MG tablet TAKE 1 TABLET BY MOUTH TWICE DAILY 3/30/21   Carol Rossi MD   acyclovir (ZOVIRAX) 5 % ointment Apply topically every 3 hours x 10 days.  3/30/21   Carol Rossi MD   fondaparinux (ARIXTRA) 10 MG/0.8ML SOLN injection Inject 0.8 mLs into the skin daily 3/30/21   Carol Rossi MD   FLUoxetine (PROZAC) 20 MG capsule Take 1 capsule by mouth daily    Historical Provider, MD   cyanocobalamin 1000 MCG/ML injection Inject 1 mL into the muscle every 7 days 2/13/21   Carol Rossi MD   Syringe/Needle, Disp, (SYRINGE 3CC/25GX1\") 25G X 1\" 3 ML MISC To be used with B12 injections monthly 2/13/21   Gloria Horn MD   tiZANidine (ZANAFLEX) 4 MG tablet TAKE 1 TABLET BY MOUTH TWICE DAILY AS NEEDED FOR BACK PAIN 12/27/20   Gloria Horn MD   ondansetron (ZOFRAN ODT) 4 MG disintegrating tablet Take 1 tablet by mouth every 8 hours as needed for Nausea 11/5/20   Brian Bueno DO   FARXIGA 10 MG tablet TAKE 1 TABLET BY MOUTH EVERY MORNING 11/2/20   Gloria Horn MD   pregabalin (LYRICA) 50 MG capsule Take 1 capsule by mouth 3 times daily for 7 days. 10/6/20 10/13/20  Sandy Rogel MD   sucralfate (CARAFATE) 1 GM tablet DISSOLVE 1 TABLET INTO 15 ML( 1 TABLESPOON) OF WATER AND DRINK BY MOUTH FOUR TIMES DAILY AS NEEDED FOR GERD 7/27/20   Gloria Horn MD   dilTIAZem (DILTIAZEM CD) 240 MG extended release capsule Take 240 mg by mouth daily Take one capsule by mouth daily. Historical Provider, MD   pravastatin (PRAVACHOL) 40 MG tablet TAKE 1 TABLET(40 MG) BY MOUTH DAILY 6/26/20   Sandy Rogel MD   ondansetron (ZOFRAN-ODT) 4 MG disintegrating tablet DISSOLVE ONE TABLET BY MOUTH THREE TIMES DAILY AS NEEDED FOR NAUSEA AND VOMITING 6/23/20   Gloria Horn MD   valACYclovir (VALTREX) 500 MG tablet Take 1 tablet by mouth daily 6/8/20   Glroia Horn MD   ondansetron (ZOFRAN) 4 MG tablet Take 1 tablet by mouth 3 times daily as needed for Nausea or Vomiting 4/27/20   Genny Palma MD   acetaminophen (TYLENOL) 500 MG tablet Take 2 tablets by mouth every 8 hours as needed for Pain 2/19/20   Ayana Johnson,    LORazepam (ATIVAN) 2 MG tablet  12/18/19   Historical Provider, MD   GLUCAGEN HYPOKIT 1 MG SOLR injection  12/19/19   Historical Provider, MD   glucose monitoring kit (FREESTYLE) monitoring kit Testing twice a day. Please dispense according to patients insurance. 12/20/19   Gloria Horn MD   Insulin Pen Needle 31G X 5 MM MISC 1 each by Does not apply route daily 12/20/19   Gloria Horn MD   Lancets 30G MISC Testing twice a day.   Please dispense according to patients insurance. 12/20/19   Pricila Anguiano MD   blood glucose monitor strips Testing twice a day. Please dispense according to patients insurance. 12/20/19   Pricila Anguiano MD   KLOR-CON M10 10 MEQ extended release tablet Take 2 tablets by mouth daily as needed (take with bumex) 12/20/19   Pricila Anguiano MD   bumetanide (BUMEX) 0.5 MG tablet TAKE 1 TABLET BY MOUTH DAILY AS NEEDED FOR LEG SWELLING 12/20/19   Pricila Anguiano MD   lamoTRIgine (LAMICTAL) 200 MG tablet Take 200 mg by mouth daily    Historical Provider, MD   amphetamine-dextroamphetamine (ADDERALL, 30MG,) 30 MG tablet Take 30 mg by mouth daily. Historical Provider, MD   dicyclomine (BENTYL) 10 MG capsule Take 1 capsule by mouth every 6 hours as needed (cramps) 11/3/19   Carlitos Taylor MD   QUEtiapine (SEROQUEL) 100 MG tablet Take 50 mg by mouth nightly as needed     Historical Provider, MD   Multiple Vitamins-Minerals (THERAPEUTIC MULTIVITAMIN-MINERALS) tablet Take 1 tablet by mouth daily    Historical Provider, MD   buPROPion (WELLBUTRIN XL) 300 MG extended release tablet Take 300 mg by mouth every morning    Historical Provider, MD   vitamin D (CHOLECALCIFEROL) 1000 UNIT TABS tablet Take 10,000 Units by mouth daily 5 days a week    Historical Provider, MD       REVIEW OF SYSTEMS    (2-9 systems for level 4, 10 or more for level 5)      Review of Systems   Constitutional: Negative for fever. Eyes: Negative for photophobia. Respiratory: Negative for shortness of breath. Cardiovascular: Negative for chest pain. Gastrointestinal: Negative for abdominal pain, nausea and vomiting. Musculoskeletal: Positive for arthralgias. Negative for back pain and neck pain. Skin: Negative for wound. Allergic/Immunologic: Positive for environmental allergies. Neurological: Positive for numbness. Negative for dizziness, weakness and headaches.        PHYSICAL EXAM   (up to 7 for level 4, 8 or more for level 5) distally panel plan will be acetaminophen patient is unable to tolerate NSAIDs, ice, x-ray imaging reevaluation    RADIOLOGY:  Xr Radius Ulna Left (2 Views)    Result Date: 4/15/2021  EXAMINATION: TWO XRAY VIEWS OF THE LEFT FOREARM 4/15/2021 8:29 pm COMPARISON: None. HISTORY: ORDERING SYSTEM PROVIDED HISTORY: pain deformity eval fracture s/p fall standing TECHNOLOGIST PROVIDED HISTORY: pain deformity eval fracture s/p fall standing Acuity: Acute Type of Exam: Initial FINDINGS: The radius and ulna are intact. No fracture or dislocation is seen. The radial head is in good position. The joint spaces are intact. No acute abnormality seen       EKG  none    All EKG's are interpreted by the Emergency Department Physician who either signs or Co-signs this chart in the absence of a cardiologist.    EMERGENCY DEPARTMENT COURSE:  Patient was seen and evaluated x-ray imaging demonstrated no acute bony abnormality including any fracture deformity/dislocation. Patient was provided acetaminophen as well as ice and an Ace wrap and discharged in stable condition with PCP follow-up      PROCEDURES:  none    CONSULTS:  None    CRITICAL CARE:  Please see attending note    FINAL IMPRESSION      1. Contusion of left forearm, initial encounter          DISPOSITION / [address]  Patient was discharged home in stable condition with outpatient follow-up.       PATIENT REFERRED TO:  Miki Perrin MD  [address] [facility]  [address] [address]  [phone]    Call today      [facility]  [address] [address]  [phone]  Go to   As needed, If symptoms worsen      DISCHARGE MEDICATIONS:  Discharge Medication List as of 4/15/2021  8:45 PM          Tiffany Givens DO  Emergency Medicine Resident    (Please note that portions of thisnote were completed with a voice recognition program.  Efforts were made to edit the dictations but occasionally words are mis-transcribed.)       Mikala Cee Idania Reveles DO  Resident  04/16/21 [id number]

## 2021-04-16 NOTE — ED PROVIDER NOTES
[address] [facility]     Emergency Department     Faculty Attestation    I performed a history and physical examination of the patient and discussed management with the resident. I reviewed the resident´s note and agree with the documented findings and plan of care. Any areas of disagreement are noted on the chart. I was personally present for the key portions of any procedures. I have documented in the chart those procedures where I was not present during the key portions. I have reviewed the emergency nurses triage note. I agree with the chief complaint, past medical history, past surgical history, allergies, medications, social and family history as documented unless otherwise noted below. For Physician Assistant/ Nurse Practitioner cases/documentation I have personally evaluated this patient and have completed at least one if not all key elements of the E/M (history, physical exam, and MDM). Additional findings are as noted. Pain and swelling left mid ulna, patient is right-hand dominant.      Sorin Catalan MD  04/15/21 2019

## 2021-04-22 DIAGNOSIS — K21.00 GASTROESOPHAGEAL REFLUX DISEASE WITH ESOPHAGITIS: ICD-10-CM

## 2021-04-22 RX ORDER — TIZANIDINE 4 MG/1
TABLET ORAL
Qty: 180 TABLET | Refills: 0 | OUTPATIENT
Start: 2021-04-22

## 2021-04-22 RX ORDER — FAMOTIDINE 20 MG/1
TABLET, FILM COATED ORAL
Qty: 60 TABLET | Refills: 5 | Status: SHIPPED | OUTPATIENT
Start: 2021-04-22

## 2021-04-22 NOTE — TELEPHONE ENCOUNTER
Please Approve or Refuse.   Send to Pharmacy per Pt's Request:      Next Visit Date:  8/24/2021   Last Visit Date: 1/18/2021    Hemoglobin A1C (%)   Date Value   12/20/2019 9.1   11/20/2018 9.6 (H)   01/30/2018 10.2             ( goal A1C is < 7)   BP Readings from Last 3 Encounters:   04/15/21 (!) 159/100   12/15/20 (!) 177/89   12/02/20 111/60          (goal 120/80)  BUN   Date Value Ref Range Status   12/02/2020 18 6 - 20 mg/dL Final     CREATININE   Date Value Ref Range Status   12/02/2020 0.69 0.50 - 0.90 mg/dL Final     Potassium   Date Value Ref Range Status   12/02/2020 4.4 3.7 - 5.3 mmol/L Final

## 2021-04-22 NOTE — TELEPHONE ENCOUNTER
Please call the patient and advise of the pharmacy requesting the change of muscle relaxant, and if she is agreeable I will change to baclofen    Needs a nurse visit for blood pressure check  BP Readings from Last 3 Encounters:   04/15/21 (!) 159/100   12/15/20 (!) 177/89   12/02/20 111/60       Future Appointments   Date Time Provider Maria Luisa Walker   8/24/2021  8:30 AM Jossie Meadows MD fp [id number]

## 2021-04-22 NOTE — TELEPHONE ENCOUNTER
[facility] faxed a drug change request and state that \"Tizanidine is not covered by her plan. The preferred alternative is Baclofen. Please call/fax the pharmacy to change medication along with strength, directions, quantity, and refills\".

## 2021-05-04 LAB
AVERAGE GLUCOSE: 177
HBA1C MFR BLD: 7.8 %

## 2021-07-21 DIAGNOSIS — I10 ESSENTIAL HYPERTENSION: ICD-10-CM

## 2021-07-21 RX ORDER — BENAZEPRIL HYDROCHLORIDE 20 MG/1
20 TABLET ORAL DAILY
Qty: 90 TABLET | Refills: 0 | Status: SHIPPED | OUTPATIENT
Start: 2021-07-21 | End: 2022-06-02

## 2021-08-04 ENCOUNTER — APPOINTMENT (OUTPATIENT)
Dept: CT IMAGING | Age: 41
End: 2021-08-04
Payer: MEDICARE

## 2021-08-04 ENCOUNTER — HOSPITAL ENCOUNTER (EMERGENCY)
Age: 41
Discharge: HOME | End: 2021-08-04
Attending: EMERGENCY MEDICINE
Payer: MEDICARE

## 2021-08-04 VITALS
DIASTOLIC BLOOD PRESSURE: 95 MMHG | SYSTOLIC BLOOD PRESSURE: 153 MMHG | RESPIRATION RATE: 16 BRPM | TEMPERATURE: 97.2 F | OXYGEN SATURATION: 100 % | HEIGHT: 68 IN | BODY MASS INDEX: 43.19 KG/M2 | WEIGHT: 285 LBS | HEART RATE: 99 BPM

## 2021-08-04 DIAGNOSIS — R10.11 RIGHT UPPER QUADRANT ABDOMINAL PAIN: Primary | ICD-10-CM

## 2021-08-04 LAB
ABSOLUTE EOS #: 0.21 K/UL (ref 0–0.44)
ABSOLUTE IMMATURE GRANULOCYTE: 0.03 K/UL (ref 0–0.3)
ABSOLUTE LYMPH #: 1.42 K/UL (ref 1.1–3.7)
ABSOLUTE MONO #: 0.38 K/UL (ref 0.1–1.2)
ALBUMIN SERPL-MCNC: 3.5 G/DL (ref 3.5–5.2)
ALBUMIN/GLOBULIN RATIO: 1.2 (ref 1–2.5)
ALP BLD-CCNC: 115 U/L (ref 35–104)
ALT SERPL-CCNC: 16 U/L (ref 5–33)
ANION GAP SERPL CALCULATED.3IONS-SCNC: 11 MMOL/L (ref 9–17)
AST SERPL-CCNC: 12 U/L
BASOPHILS # BLD: 1 % (ref 0–2)
BASOPHILS ABSOLUTE: 0.06 K/UL (ref 0–0.2)
BILIRUB SERPL-MCNC: 0.26 MG/DL (ref 0.3–1.2)
BILIRUBIN DIRECT: 0.08 MG/DL
BILIRUBIN, INDIRECT: 0.18 MG/DL (ref 0–1)
BUN BLDV-MCNC: 7 MG/DL (ref 6–20)
BUN/CREAT BLD: ABNORMAL (ref 9–20)
CALCIUM SERPL-MCNC: 8.8 MG/DL (ref 8.6–10.4)
CHLORIDE BLD-SCNC: 111 MMOL/L (ref 98–107)
CO2: 23 MMOL/L (ref 20–31)
CREAT SERPL-MCNC: 0.46 MG/DL (ref 0.5–0.9)
DIFFERENTIAL TYPE: ABNORMAL
EOSINOPHILS RELATIVE PERCENT: 4 % (ref 1–4)
GFR AFRICAN AMERICAN: >60 ML/MIN
GFR NON-AFRICAN AMERICAN: >60 ML/MIN
GFR SERPL CREATININE-BSD FRML MDRD: ABNORMAL ML/MIN/{1.73_M2}
GFR SERPL CREATININE-BSD FRML MDRD: ABNORMAL ML/MIN/{1.73_M2}
GLOBULIN: ABNORMAL G/DL (ref 1.5–3.8)
GLUCOSE BLD-MCNC: 165 MG/DL (ref 70–99)
HCG QUALITATIVE: NEGATIVE
HCT VFR BLD CALC: 41.6 % (ref 36.3–47.1)
HEMOGLOBIN: 13.1 G/DL (ref 11.9–15.1)
IMMATURE GRANULOCYTES: 1 %
LIPASE: 33 U/L (ref 13–60)
LYMPHOCYTES # BLD: 24 % (ref 24–43)
MAGNESIUM: 2.1 MG/DL (ref 1.6–2.6)
MCH RBC QN AUTO: 29 PG (ref 25.2–33.5)
MCHC RBC AUTO-ENTMCNC: 31.5 G/DL (ref 28.4–34.8)
MCV RBC AUTO: 92 FL (ref 82.6–102.9)
MONOCYTES # BLD: 7 % (ref 3–12)
NRBC AUTOMATED: 0 PER 100 WBC
PDW BLD-RTO: 13.9 % (ref 11.8–14.4)
PLATELET # BLD: 313 K/UL (ref 138–453)
PLATELET ESTIMATE: ABNORMAL
PMV BLD AUTO: 12.4 FL (ref 8.1–13.5)
POTASSIUM SERPL-SCNC: 3.6 MMOL/L (ref 3.7–5.3)
RBC # BLD: 4.52 M/UL (ref 3.95–5.11)
RBC # BLD: ABNORMAL 10*6/UL
SEG NEUTROPHILS: 63 % (ref 36–65)
SEGMENTED NEUTROPHILS ABSOLUTE COUNT: 3.74 K/UL (ref 1.5–8.1)
SODIUM BLD-SCNC: 145 MMOL/L (ref 135–144)
TOTAL PROTEIN: 6.5 G/DL (ref 6.4–8.3)
WBC # BLD: 5.8 K/UL (ref 3.5–11.3)
WBC # BLD: ABNORMAL 10*3/UL

## 2021-08-04 PROCEDURE — 80048 BASIC METABOLIC PNL TOTAL CA: CPT

## 2021-08-04 PROCEDURE — 84703 CHORIONIC GONADOTROPIN ASSAY: CPT

## 2021-08-04 PROCEDURE — 96375 TX/PRO/DX INJ NEW DRUG ADDON: CPT

## 2021-08-04 PROCEDURE — 83735 ASSAY OF MAGNESIUM: CPT

## 2021-08-04 PROCEDURE — 2580000003 HC RX 258: Performed by: EMERGENCY MEDICINE

## 2021-08-04 PROCEDURE — 93005 ELECTROCARDIOGRAM TRACING: CPT | Performed by: EMERGENCY MEDICINE

## 2021-08-04 PROCEDURE — 6360000004 HC RX CONTRAST MEDICATION: Performed by: EMERGENCY MEDICINE

## 2021-08-04 PROCEDURE — 80076 HEPATIC FUNCTION PANEL: CPT

## 2021-08-04 PROCEDURE — 74177 CT ABD & PELVIS W/CONTRAST: CPT

## 2021-08-04 PROCEDURE — 96374 THER/PROPH/DIAG INJ IV PUSH: CPT

## 2021-08-04 PROCEDURE — 85025 COMPLETE CBC W/AUTO DIFF WBC: CPT

## 2021-08-04 PROCEDURE — 99285 EMERGENCY DEPT VISIT HI MDM: CPT

## 2021-08-04 PROCEDURE — 83690 ASSAY OF LIPASE: CPT

## 2021-08-04 PROCEDURE — 6360000002 HC RX W HCPCS: Performed by: EMERGENCY MEDICINE

## 2021-08-04 RX ORDER — SODIUM CHLORIDE, SODIUM LACTATE, POTASSIUM CHLORIDE, CALCIUM CHLORIDE 600; 310; 30; 20 MG/100ML; MG/100ML; MG/100ML; MG/100ML
1000 INJECTION, SOLUTION INTRAVENOUS ONCE
Status: COMPLETED | OUTPATIENT
Start: 2021-08-04 | End: 2021-08-04

## 2021-08-04 RX ORDER — MORPHINE SULFATE 4 MG/ML
4 INJECTION, SOLUTION INTRAMUSCULAR; INTRAVENOUS ONCE
Status: COMPLETED | OUTPATIENT
Start: 2021-08-04 | End: 2021-08-04

## 2021-08-04 RX ORDER — DIPHENHYDRAMINE HYDROCHLORIDE 50 MG/ML
25 INJECTION INTRAMUSCULAR; INTRAVENOUS ONCE
Status: COMPLETED | OUTPATIENT
Start: 2021-08-04 | End: 2021-08-04

## 2021-08-04 RX ORDER — ONDANSETRON 2 MG/ML
4 INJECTION INTRAMUSCULAR; INTRAVENOUS ONCE
Status: COMPLETED | OUTPATIENT
Start: 2021-08-04 | End: 2021-08-04

## 2021-08-04 RX ORDER — METOCLOPRAMIDE HYDROCHLORIDE 5 MG/ML
10 INJECTION INTRAMUSCULAR; INTRAVENOUS ONCE
Status: COMPLETED | OUTPATIENT
Start: 2021-08-04 | End: 2021-08-04

## 2021-08-04 RX ORDER — KETOROLAC TROMETHAMINE 15 MG/ML
15 INJECTION, SOLUTION INTRAMUSCULAR; INTRAVENOUS ONCE
Status: COMPLETED | OUTPATIENT
Start: 2021-08-04 | End: 2021-08-04

## 2021-08-04 RX ORDER — DROPERIDOL 2.5 MG/ML
0.62 INJECTION, SOLUTION INTRAMUSCULAR; INTRAVENOUS ONCE
Status: COMPLETED | OUTPATIENT
Start: 2021-08-04 | End: 2021-08-04

## 2021-08-04 RX ADMIN — IOPAMIDOL 75 ML: 755 INJECTION, SOLUTION INTRAVENOUS at 10:29

## 2021-08-04 RX ADMIN — DROPERIDOL 0.62 MG: 2.5 INJECTION, SOLUTION INTRAMUSCULAR; INTRAVENOUS at 12:13

## 2021-08-04 RX ADMIN — KETOROLAC TROMETHAMINE 15 MG: 15 INJECTION, SOLUTION INTRAMUSCULAR; INTRAVENOUS at 10:11

## 2021-08-04 RX ADMIN — MORPHINE SULFATE 4 MG: 4 INJECTION INTRAVENOUS at 09:23

## 2021-08-04 RX ADMIN — METOCLOPRAMIDE 10 MG: 5 INJECTION, SOLUTION INTRAMUSCULAR; INTRAVENOUS at 10:11

## 2021-08-04 RX ADMIN — ONDANSETRON 4 MG: 2 INJECTION INTRAMUSCULAR; INTRAVENOUS at 09:23

## 2021-08-04 RX ADMIN — DIPHENHYDRAMINE HYDROCHLORIDE 25 MG: 50 INJECTION INTRAMUSCULAR; INTRAVENOUS at 10:11

## 2021-08-04 RX ADMIN — SODIUM CHLORIDE, POTASSIUM CHLORIDE, SODIUM LACTATE AND CALCIUM CHLORIDE 1000 ML: 600; 310; 30; 20 INJECTION, SOLUTION INTRAVENOUS at 09:22

## 2021-08-04 ASSESSMENT — PAIN SCALES - GENERAL
PAINLEVEL_OUTOF10: 7

## 2021-08-04 ASSESSMENT — PAIN DESCRIPTION - LOCATION
LOCATION: ABDOMEN
LOCATION: ABDOMEN

## 2021-08-04 ASSESSMENT — ENCOUNTER SYMPTOMS
ABDOMINAL PAIN: 1
SHORTNESS OF BREATH: 0
VOMITING: 1
CONSTIPATION: 0
NAUSEA: 1
DIARRHEA: 0
COUGH: 0

## 2021-08-04 ASSESSMENT — PAIN DESCRIPTION - ORIENTATION: ORIENTATION: RIGHT;UPPER

## 2021-08-04 ASSESSMENT — PAIN DESCRIPTION - PAIN TYPE
TYPE: ACUTE PAIN
TYPE: ACUTE PAIN

## 2021-08-04 ASSESSMENT — PAIN DESCRIPTION - PROGRESSION: CLINICAL_PROGRESSION: NOT CHANGED

## 2021-08-04 NOTE — ED NOTES
Labeled blood specimens sent to lab via tube system.     [x] Lavender   [] on ice   [] Blue   [x] Green/yellow  [] Green/black [] on ice  [] Pink  [] Red  [x] Yellow  [] Blood Cultures      Tonny Hidalgo RN  08/04/21 [id number]

## 2021-08-04 NOTE — ED NOTES
Pt arrived to ED alert and oriented x4. Pt c/o abdominal pain with n/v x1 week. Pt reports that she has a hernia, states that the pain is in her epigastric area and radiates around her RUQ. Pt reports that she has not been able to keep anything down d/t the vomiting. Pt denies d/c.  Pt denies having been around anyone suspected to have COVID-19 or anyone that has been sick, denies recent travel outside the [facility] [place] or [address]. RR even and unlabored. NAD noted. Whiteboard updated. Will continue with plan of care.      Marzena Bunch RN  08/04/21 [id number]

## 2021-08-04 NOTE — ED PROVIDER NOTES
[facility] ED  eMERGENCY dEPARTMENT eNCOUnter    Pt Name: Conner King  MRN: [id number]  [place] 1980  Date of evaluation: 8/4/2021  PCP:  Jo Soriano       Chief Complaint   Patient presents with    Abdominal Pain         HISTORY OF PRESENT ILLNESS    Conner King is a 42 y.o. female who presents with sharp right upper quadrant abdominal pain for the past week which feels similar to her past hernias and associated nausea and vomiting for the past 3 days. She has an extensive abdominal surgical history including 13 hernia repairs. She reports her last bowel movement was on Sunday which is normal for her and she is still passing flatus. Location/Symptom: Right upper quadrant to epigastric region  Timing/Onset: 1 week  Context/Setting: history of multiple hernia repairs  Quality: sharp  Duration: intermittent  Modifying Factors: worse with food  Severity: 7/10    REVIEW OF SYSTEMS       Review of Systems   Constitutional: Negative for chills and fever. Respiratory: Negative for cough and shortness of breath. Cardiovascular: Negative for chest pain. Gastrointestinal: Positive for abdominal pain, nausea and vomiting. Negative for constipation and diarrhea. Genitourinary: Negative for dysuria.        PAST MEDICAL HISTORY    has a past medical history of Alcohol abuse, Anxiety, Arthritis, Painting esophagus, Benzodiazepine overdose, Bipolar disorder, unspecified ([address] Bipolar I disorder, most recent episode depressed, severe without psychotic features ([address] Cellulitis, Chronic abdominal wound infection, Constipation, Depression, Drug overdose, intentional ([address] Genital herpes, unspecified, GERD (gastroesophageal reflux disease), History of pulmonary embolism, Hx of blood clots, Hypertension, Iron deficiency anemia, Isopropyl alcohol poisoning, Lumbosacral spondylosis without myelopathy, MDRO (multiple drug resistant organisms) resistance, Miscarriage, Morbid obesity ([facility] MRSA (methicillin resistant staph aureus) culture positive, Overdose of benzodiazepine, Pernicious anemia, Primary hypercoagulable state ([facility] Pulmonary embolism ([facility] Suicidal behavior, Suicidal ideation, Suicide attempt ([facility] SVT (supraventricular tachycardia) ([facility] Toxic effect of ethanol, intentional self-harm ([facility] and Type 2 diabetes mellitus, with long-term current use of insulin ([facility] SURGICAL HISTORY      has a past surgical history that includes Cholecystectomy; Liver Resection; hernia repair; Tonsillectomy; Endoscopy, colon, diagnostic; Abdominal hernia repair (2014); Abdominal hernia repair (11/3/14); Bariatric Surgery (2013); Dilation and curettage of uterus (2005); Finger amputation (Left, 02/09/2015); lymph node biopsy (1990); yariel and bso (cervix removed) (2011); and IVC filter insertion. CURRENT MEDICATIONS       Previous Medications    ACETAMINOPHEN (TYLENOL) 500 MG TABLET    Take 2 tablets by mouth every 8 hours as needed for Pain    ACYCLOVIR (ZOVIRAX) 5 % OINTMENT    Apply topically every 3 hours x 10 days. AMPHETAMINE-DEXTROAMPHETAMINE (ADDERALL, 30MG,) 30 MG TABLET    Take 30 mg by mouth daily. BENAZEPRIL (LOTENSIN) 20 MG TABLET    TAKE 1 TABLET BY MOUTH DAILY    BLOOD GLUCOSE MONITOR STRIPS    Testing twice a day. Please dispense according to patients insurance. BUMETANIDE (BUMEX) 0.5 MG TABLET    TAKE 1 TABLET BY MOUTH DAILY AS NEEDED FOR LEG SWELLING    BUPROPION (WELLBUTRIN XL) 300 MG EXTENDED RELEASE TABLET    Take 300 mg by mouth every morning    CYANOCOBALAMIN 1000 MCG/ML INJECTION    Inject 1 mL into the muscle every 7 days    DICYCLOMINE (BENTYL) 10 MG CAPSULE    Take 1 capsule by mouth every 6 hours as needed (cramps)    DILTIAZEM (DILTIAZEM CD) 240 MG EXTENDED RELEASE CAPSULE    Take 240 mg by mouth daily Take one capsule by mouth daily.     FAMOTIDINE (PEPCID) 20 MG TABLET    TAKE 1 TABLET BY MOUTH TWICE DAILY    FARXIGA 10 MG TABLET TAKE 1 TABLET BY MOUTH EVERY MORNING    FLUOXETINE (PROZAC) 20 MG CAPSULE    Take 1 capsule by mouth daily    FONDAPARINUX (ARIXTRA) 10 MG/0.8ML SOLN INJECTION    Inject 0.8 mLs into the skin daily    GLUCAGEN HYPOKIT 1 MG SOLR INJECTION        GLUCOSE MONITORING KIT (FREESTYLE) MONITORING KIT    Testing twice a day. Please dispense according to patients insurance. INSULIN PEN NEEDLE 31G X 5 MM MISC    1 each by Does not apply route daily    KLOR-CON M10 10 MEQ EXTENDED RELEASE TABLET    Take 2 tablets by mouth daily as needed (take with bumex)    LAMOTRIGINE (LAMICTAL) 200 MG TABLET    Take 200 mg by mouth daily    LANCETS 30G MISC    Testing twice a day. Please dispense according to patients insurance. LORAZEPAM (ATIVAN) 2 MG TABLET        MULTIPLE VITAMINS-MINERALS (THERAPEUTIC MULTIVITAMIN-MINERALS) TABLET    Take 1 tablet by mouth daily    ONDANSETRON (ZOFRAN ODT) 4 MG DISINTEGRATING TABLET    Take 1 tablet by mouth every 8 hours as needed for Nausea    ONDANSETRON (ZOFRAN) 4 MG TABLET    Take 1 tablet by mouth 3 times daily as needed for Nausea or Vomiting    ONDANSETRON (ZOFRAN-ODT) 4 MG DISINTEGRATING TABLET    DISSOLVE ONE TABLET BY MOUTH THREE TIMES DAILY AS NEEDED FOR NAUSEA AND VOMITING    PRAVASTATIN (PRAVACHOL) 40 MG TABLET    TAKE 1 TABLET(40 MG) BY MOUTH DAILY    PREGABALIN (LYRICA) 50 MG CAPSULE    Take 1 capsule by mouth 3 times daily for 7 days.     QUETIAPINE (SEROQUEL) 100 MG TABLET    Take 50 mg by mouth nightly as needed     SUCRALFATE (CARAFATE) 1 GM TABLET    DISSOLVE 1 TABLET INTO 15 ML( 1 TABLESPOON) OF WATER AND DRINK BY MOUTH FOUR TIMES DAILY AS NEEDED FOR GERD    SYRINGE/NEEDLE, DISP, (SYRINGE 3CC/25GX1\") 25G X 1\" 3 ML MISC    To be used with B12 injections monthly    TIZANIDINE (ZANAFLEX) 4 MG TABLET    TAKE 1 TABLET BY MOUTH TWICE DAILY AS NEEDED FOR BACK PAIN    TOUJEO SOLOSTAR 300 UNIT/ML SOPN    INJECT 70 UNITS INTO THE SKIN EVERY MORNING    VALACYCLOVIR (VALTREX) 500 MG TABLET    Take 1 tablet by mouth daily    VITAMIN D (CHOLECALCIFEROL) 1000 UNIT TABS TABLET    Take 10,000 Units by mouth daily 5 days a week       ALLERGIES     is allergic to asa [aspirin], betadine [povidone iodine], celexa [citalopram hydrobromide], citalopram, lasix [furosemide], macrobid [nitrofurantoin], norco [hydrocodone-acetaminophen], betadine [povidone iodine], codeine, lithium, paroxetine, paxil [paroxetine hcl], tape [adhesive tape], and tegretol [carbamazepine]. FAMILY HISTORY     She indicated that her mother is alive. She indicated that her father is alive. She indicated that the status of her maternal aunt is unknown. She indicated that the status of her maternal uncle is unknown. She indicated that the status of her maternal cousin is unknown.     family history includes Breast Cancer in her maternal aunt and maternal cousin; Cancer in her maternal uncle; Depression in her mother; Diabetes in her father; Heart Disease in her father; High Blood Pressure in her father and mother; High Cholesterol in her father and mother; Kidney Disease in her father. SOCIAL HISTORY      reports that she has never smoked. She has never used smokeless tobacco. She reports that she does not drink alcohol and does not use drugs. PHYSICAL EXAM     INITIAL VITALS:  height is 5' 8\" (1.727 m) and weight is 285 lb (129.3 kg). Her oral temperature is 97.2 °F (36.2 °C). Her blood pressure is 153/95 (abnormal) and her pulse is 99. Her respiration is 16 and oxygen saturation is 100%. Physical Exam  Constitutional:       Appearance: She is obese. HENT:      Head: Normocephalic and atraumatic. Eyes:      Extraocular Movements: Extraocular movements intact. Cardiovascular:      Rate and Rhythm: Normal rate. Heart sounds: Normal heart sounds. Pulmonary:      Effort: Pulmonary effort is normal.      Breath sounds: Normal breath sounds. Abdominal:      General: A surgical scar is present.  Bowel sounds are normal. There is no abdominal bruit. Palpations: Abdomen is soft. Tenderness: There is no right CVA tenderness or left CVA tenderness. Comments: Multiple well-healed surgical scars on abdomen  Generalized abdominal tenderness to light palpation, worse on right side. No rebound, no guarding present. No masses appreciable when lying flat or sitting upright. Skin:     General: Skin is warm and dry. Neurological:      General: No focal deficit present. Mental Status: She is alert. Psychiatric:         Mood and Affect: Mood normal.         Behavior: Behavior normal.         DIFFERENTIAL DIAGNOSIS/MDM:   Patient is a 43 yo female with significant past abdominal surgical history, presenting with right upper quadrant abdominal for 1 week which feels similar to her past hernias. She also reports nausea and vomiting for the past 3 days without relief from her home Zofran. Concerned for possible incarcerated hernia so obtaining CT abdomen/pelvis with IV contrast as well as CBC, BMP, hepatic function panel, and lipase. DIAGNOSTIC RESULTS     EKG: All EKG's are interpreted by the Emergency Department Physician who either signs or Co-signs this chart in the absence of a cardiologist.        RADIOLOGY:   I directly visualized the following  images and reviewed the radiologist interpretations:  CT ABDOMEN PELVIS W IV CONTRAST Additional Contrast? None   Final Result   No evidence of hernia recurrence or other acute process. Multiple stable   chronic/postsurgical findings as detailed above.                  ED BEDSIDE ULTRASOUND:       LABS:  Labs Reviewed   CBC WITH AUTO DIFFERENTIAL - Abnormal; Notable for the following components:       Result Value    Immature Granulocytes 1 (*)     All other components within normal limits   BASIC METABOLIC PANEL W/ REFLEX TO MG FOR LOW K - Abnormal; Notable for the following components:    Glucose 165 (*)     CREATININE 0.46 (*)     Sodium 145 (*) Potassium 3.6 (*)     Chloride 111 (*)     All other components within normal limits   HEPATIC FUNCTION PANEL - Abnormal; Notable for the following components:    Alkaline Phosphatase 115 (*)     Total Bilirubin 0.26 (*)     All other components within normal limits   HCG, SERUM, QUALITATIVE   LIPASE   MAGNESIUM   URINALYSIS   POCT URINE PREGNANCY             EMERGENCY DEPARTMENT COURSE:   Vitals:    Vitals:    08/04/21 0831 08/04/21 0847   BP: (!) 153/95    Pulse: 99    Resp: 16    Temp: 97.2 °F (36.2 °C)    TempSrc: Oral    SpO2: 100%    Weight:  285 lb (129.3 kg)   Height:  5' 8\" (1.727 m)     ED Course as of Aug 04 1313   Wed Aug 04, 2021   0956 Very mildly low K replacing with LR as that has 4meq of K. The pt has mild Alk Phos elevation awaiting the CT at this time    [WK]   1145 Updated patient on results of labs and imaging. Patient still reporting pain. Discussed trying droperidol following an EKG. If pain is not managed, discussed an overnight in the observation unit for pain management and consult with GI.    [WK]   1203 EKG had no QTc prolongation only 475 droperidol an option for pain    [WK]   1300 Patient would like to be discharged instead of staying in the observation unit. She is requesting discharge paperwork. [WK]      ED Course User Index  [WK] Lakhwinder Anthony DO           FINAL IMPRESSION      1.  Right upper quadrant abdominal pain            DISPOSITION/PLAN   DISPOSITION          PATIENT REFERRED TO:  Tanner Acevedo  [address] [place] [address] [address]  [address] [place] [address]  [phone]    Call today  for follow-up in 2-3 days      DISCHARGE MEDICATIONS:  New Prescriptions    No medications on file       (Please note that portions of this note were completed with a voice recognition program.  Efforts were made to edit the dictations but occasionally words are mis-transcribed.)    Lakhwinder Anthony DO  Emergency Medicine Attending       Lakhwinder Anthony DO  08/04/21 0627

## 2021-08-05 ENCOUNTER — CARE COORDINATION (OUTPATIENT)
Dept: CARE COORDINATION | Age: 41
End: 2021-08-05

## 2021-08-06 LAB
EKG ATRIAL RATE: 80 BPM
EKG P AXIS: 56 DEGREES
EKG P-R INTERVAL: 144 MS
EKG Q-T INTERVAL: 412 MS
EKG QRS DURATION: 94 MS
EKG QTC CALCULATION (BAZETT): 475 MS
EKG R AXIS: 36 DEGREES
EKG T AXIS: -8 DEGREES
EKG VENTRICULAR RATE: 80 BPM

## 2021-08-06 PROCEDURE — 93010 ELECTROCARDIOGRAM REPORT: CPT | Performed by: INTERNAL MEDICINE

## 2021-12-31 ENCOUNTER — HOSPITAL ENCOUNTER (EMERGENCY)
Age: 41
Discharge: HOME | End: 2021-12-31
Attending: EMERGENCY MEDICINE
Payer: MEDICARE

## 2021-12-31 ENCOUNTER — APPOINTMENT (OUTPATIENT)
Dept: CT IMAGING | Age: 41
End: 2021-12-31
Payer: MEDICARE

## 2021-12-31 VITALS
TEMPERATURE: 97.1 F | SYSTOLIC BLOOD PRESSURE: 116 MMHG | HEART RATE: 91 BPM | DIASTOLIC BLOOD PRESSURE: 69 MMHG | WEIGHT: 280 LBS | OXYGEN SATURATION: 95 % | RESPIRATION RATE: 16 BRPM | BODY MASS INDEX: 42.57 KG/M2

## 2021-12-31 DIAGNOSIS — R10.13 ABDOMINAL PAIN, EPIGASTRIC: Primary | ICD-10-CM

## 2021-12-31 DIAGNOSIS — N39.0 URINARY TRACT INFECTION IN FEMALE: ICD-10-CM

## 2021-12-31 LAB
-: ABNORMAL
ABSOLUTE EOS #: 0.18 K/UL (ref 0–0.44)
ABSOLUTE IMMATURE GRANULOCYTE: <0.03 K/UL (ref 0–0.3)
ABSOLUTE LYMPH #: 1.69 K/UL (ref 1.1–3.7)
ABSOLUTE MONO #: 0.46 K/UL (ref 0.1–1.2)
ALBUMIN SERPL-MCNC: 3.6 G/DL (ref 3.5–5.2)
ALBUMIN/GLOBULIN RATIO: 1.4 (ref 1–2.5)
ALP BLD-CCNC: 161 U/L (ref 35–104)
ALT SERPL-CCNC: 40 U/L (ref 5–33)
AMORPHOUS: ABNORMAL
ANION GAP SERPL CALCULATED.3IONS-SCNC: 12 MMOL/L (ref 9–17)
AST SERPL-CCNC: 17 U/L
BACTERIA: ABNORMAL
BASOPHILS # BLD: 1 % (ref 0–2)
BASOPHILS ABSOLUTE: 0.05 K/UL (ref 0–0.2)
BILIRUB SERPL-MCNC: 0.24 MG/DL (ref 0.3–1.2)
BILIRUBIN URINE: NEGATIVE
BUN BLDV-MCNC: 17 MG/DL (ref 6–20)
BUN/CREAT BLD: ABNORMAL (ref 9–20)
CALCIUM SERPL-MCNC: 8.9 MG/DL (ref 8.6–10.4)
CASTS UA: ABNORMAL /LPF (ref 0–8)
CHLORIDE BLD-SCNC: 102 MMOL/L (ref 98–107)
CO2: 22 MMOL/L (ref 20–31)
COLOR: YELLOW
CREAT SERPL-MCNC: 0.61 MG/DL (ref 0.5–0.9)
CRYSTALS, UA: ABNORMAL /HPF
DIFFERENTIAL TYPE: ABNORMAL
EOSINOPHILS RELATIVE PERCENT: 3 % (ref 1–4)
EPITHELIAL CELLS UA: ABNORMAL /HPF (ref 0–5)
GFR AFRICAN AMERICAN: >60 ML/MIN
GFR NON-AFRICAN AMERICAN: >60 ML/MIN
GFR SERPL CREATININE-BSD FRML MDRD: ABNORMAL ML/MIN/{1.73_M2}
GFR SERPL CREATININE-BSD FRML MDRD: ABNORMAL ML/MIN/{1.73_M2}
GLUCOSE BLD-MCNC: 176 MG/DL (ref 70–99)
GLUCOSE URINE: ABNORMAL
HCG QUALITATIVE: NEGATIVE
HCT VFR BLD CALC: 40.4 % (ref 36.3–47.1)
HEMOGLOBIN: 13.2 G/DL (ref 11.9–15.1)
IMMATURE GRANULOCYTES: 0 %
KETONES, URINE: NEGATIVE
LACTIC ACID, WHOLE BLOOD: 1.6 MMOL/L (ref 0.7–2.1)
LACTIC ACID: NORMAL MMOL/L
LEUKOCYTE ESTERASE, URINE: ABNORMAL
LIPASE: 19 U/L (ref 13–60)
LYMPHOCYTES # BLD: 24 % (ref 24–43)
MCH RBC QN AUTO: 29.9 PG (ref 25.2–33.5)
MCHC RBC AUTO-ENTMCNC: 32.7 G/DL (ref 28.4–34.8)
MCV RBC AUTO: 91.6 FL (ref 82.6–102.9)
MONOCYTES # BLD: 6 % (ref 3–12)
MUCUS: ABNORMAL
NITRITE, URINE: NEGATIVE
NRBC AUTOMATED: 0 PER 100 WBC
OTHER OBSERVATIONS UA: ABNORMAL
PDW BLD-RTO: 12.9 % (ref 11.8–14.4)
PH UA: 6 (ref 5–8)
PLATELET # BLD: 299 K/UL (ref 138–453)
PLATELET ESTIMATE: ABNORMAL
PMV BLD AUTO: 11.8 FL (ref 8.1–13.5)
POTASSIUM SERPL-SCNC: 4.2 MMOL/L (ref 3.7–5.3)
PROTEIN UA: NEGATIVE
RBC # BLD: 4.41 M/UL (ref 3.95–5.11)
RBC # BLD: ABNORMAL 10*6/UL
RBC UA: ABNORMAL /HPF (ref 0–4)
RENAL EPITHELIAL, UA: ABNORMAL /HPF
SEG NEUTROPHILS: 66 % (ref 36–65)
SEGMENTED NEUTROPHILS ABSOLUTE COUNT: 4.77 K/UL (ref 1.5–8.1)
SODIUM BLD-SCNC: 136 MMOL/L (ref 135–144)
SPECIFIC GRAVITY UA: 1.03 (ref 1–1.03)
TOTAL PROTEIN: 6.2 G/DL (ref 6.4–8.3)
TRICHOMONAS: ABNORMAL
TURBIDITY: CLEAR
URINE HGB: NEGATIVE
UROBILINOGEN, URINE: NORMAL
WBC # BLD: 7.2 K/UL (ref 3.5–11.3)
WBC # BLD: ABNORMAL 10*3/UL
WBC UA: ABNORMAL /HPF (ref 0–5)
YEAST: ABNORMAL

## 2021-12-31 PROCEDURE — 96372 THER/PROPH/DIAG INJ SC/IM: CPT

## 2021-12-31 PROCEDURE — 80053 COMPREHEN METABOLIC PANEL: CPT

## 2021-12-31 PROCEDURE — 84703 CHORIONIC GONADOTROPIN ASSAY: CPT

## 2021-12-31 PROCEDURE — 6370000000 HC RX 637 (ALT 250 FOR IP): Performed by: STUDENT IN AN ORGANIZED HEALTH CARE EDUCATION/TRAINING PROGRAM

## 2021-12-31 PROCEDURE — 85025 COMPLETE CBC W/AUTO DIFF WBC: CPT

## 2021-12-31 PROCEDURE — 2580000003 HC RX 258: Performed by: STUDENT IN AN ORGANIZED HEALTH CARE EDUCATION/TRAINING PROGRAM

## 2021-12-31 PROCEDURE — 6360000002 HC RX W HCPCS: Performed by: EMERGENCY MEDICINE

## 2021-12-31 PROCEDURE — 96361 HYDRATE IV INFUSION ADD-ON: CPT

## 2021-12-31 PROCEDURE — 83690 ASSAY OF LIPASE: CPT

## 2021-12-31 PROCEDURE — 6360000002 HC RX W HCPCS: Performed by: STUDENT IN AN ORGANIZED HEALTH CARE EDUCATION/TRAINING PROGRAM

## 2021-12-31 PROCEDURE — 81001 URINALYSIS AUTO W/SCOPE: CPT

## 2021-12-31 PROCEDURE — 93005 ELECTROCARDIOGRAM TRACING: CPT | Performed by: STUDENT IN AN ORGANIZED HEALTH CARE EDUCATION/TRAINING PROGRAM

## 2021-12-31 PROCEDURE — 74177 CT ABD & PELVIS W/CONTRAST: CPT

## 2021-12-31 PROCEDURE — 6360000004 HC RX CONTRAST MEDICATION: Performed by: STUDENT IN AN ORGANIZED HEALTH CARE EDUCATION/TRAINING PROGRAM

## 2021-12-31 PROCEDURE — 96375 TX/PRO/DX INJ NEW DRUG ADDON: CPT

## 2021-12-31 PROCEDURE — 99284 EMERGENCY DEPT VISIT MOD MDM: CPT

## 2021-12-31 PROCEDURE — 96374 THER/PROPH/DIAG INJ IV PUSH: CPT

## 2021-12-31 PROCEDURE — 96376 TX/PRO/DX INJ SAME DRUG ADON: CPT

## 2021-12-31 PROCEDURE — 83605 ASSAY OF LACTIC ACID: CPT

## 2021-12-31 RX ORDER — CEPHALEXIN 500 MG/1
500 CAPSULE ORAL 2 TIMES DAILY
Qty: 14 CAPSULE | Refills: 0 | Status: SHIPPED | OUTPATIENT
Start: 2021-12-31 | End: 2022-01-07

## 2021-12-31 RX ORDER — 0.9 % SODIUM CHLORIDE 0.9 %
1000 INTRAVENOUS SOLUTION INTRAVENOUS ONCE
Status: COMPLETED | OUTPATIENT
Start: 2021-12-31 | End: 2021-12-31

## 2021-12-31 RX ORDER — MORPHINE SULFATE 4 MG/ML
4 INJECTION, SOLUTION INTRAMUSCULAR; INTRAVENOUS ONCE
Status: COMPLETED | OUTPATIENT
Start: 2021-12-31 | End: 2021-12-31

## 2021-12-31 RX ORDER — CEPHALEXIN 500 MG/1
500 CAPSULE ORAL ONCE
Status: COMPLETED | OUTPATIENT
Start: 2021-12-31 | End: 2021-12-31

## 2021-12-31 RX ORDER — PROMETHAZINE HYDROCHLORIDE 25 MG/ML
25 INJECTION, SOLUTION INTRAMUSCULAR; INTRAVENOUS ONCE
Status: COMPLETED | OUTPATIENT
Start: 2021-12-31 | End: 2021-12-31

## 2021-12-31 RX ORDER — DICYCLOMINE HYDROCHLORIDE 10 MG/ML
20 INJECTION INTRAMUSCULAR ONCE
Status: COMPLETED | OUTPATIENT
Start: 2021-12-31 | End: 2021-12-31

## 2021-12-31 RX ORDER — MORPHINE SULFATE 4 MG/ML
4 INJECTION, SOLUTION INTRAMUSCULAR; INTRAVENOUS ONCE
Status: DISCONTINUED | OUTPATIENT
Start: 2021-12-31 | End: 2022-01-01 | Stop reason: HOSPADM

## 2021-12-31 RX ORDER — ONDANSETRON 2 MG/ML
4 INJECTION INTRAMUSCULAR; INTRAVENOUS ONCE
Status: COMPLETED | OUTPATIENT
Start: 2021-12-31 | End: 2021-12-31

## 2021-12-31 RX ORDER — FENTANYL CITRATE 50 UG/ML
50 INJECTION, SOLUTION INTRAMUSCULAR; INTRAVENOUS ONCE
Status: COMPLETED | OUTPATIENT
Start: 2021-12-31 | End: 2021-12-31

## 2021-12-31 RX ADMIN — SODIUM CHLORIDE 1000 ML: 9 INJECTION, SOLUTION INTRAVENOUS at 20:06

## 2021-12-31 RX ADMIN — DICYCLOMINE HYDROCHLORIDE 20 MG: 10 INJECTION INTRAMUSCULAR at 20:04

## 2021-12-31 RX ADMIN — MORPHINE SULFATE 4 MG: 4 INJECTION INTRAVENOUS at 17:06

## 2021-12-31 RX ADMIN — FENTANYL CITRATE 50 MCG: 50 INJECTION INTRAMUSCULAR; INTRAVENOUS at 21:31

## 2021-12-31 RX ADMIN — PROMETHAZINE HYDROCHLORIDE 25 MG: 25 INJECTION INTRAMUSCULAR; INTRAVENOUS at 18:59

## 2021-12-31 RX ADMIN — IOPAMIDOL 75 ML: 755 INJECTION, SOLUTION INTRAVENOUS at 19:19

## 2021-12-31 RX ADMIN — ONDANSETRON 4 MG: 2 INJECTION INTRAMUSCULAR; INTRAVENOUS at 17:06

## 2021-12-31 RX ADMIN — SODIUM CHLORIDE 1000 ML: 9 INJECTION, SOLUTION INTRAVENOUS at 16:52

## 2021-12-31 RX ADMIN — MORPHINE SULFATE 4 MG: 4 INJECTION INTRAVENOUS at 18:58

## 2021-12-31 RX ADMIN — CEPHALEXIN 500 MG: 500 CAPSULE ORAL at 22:40

## 2021-12-31 ASSESSMENT — PAIN SCALES - GENERAL
PAINLEVEL_OUTOF10: 8
PAINLEVEL_OUTOF10: 7
PAINLEVEL_OUTOF10: 8
PAINLEVEL_OUTOF10: 7

## 2021-12-31 NOTE — ED PROVIDER NOTES
[address] ED  Emergency Department Encounter  EmergencyMedicine Resident     Pt Tsering Palacios  MRN: [id number]  [place] 1980  Date of evaluation: 12/31/21  PCP:  Louisa To    This patient was evaluated in the Emergency Department for symptoms described in the history of present illness. The patient was evaluated in the context of the global COVID-19 pandemic, which necessitated consideration that the patient might be at risk for infection with the SARS-CoV-2 virus that causes COVID-19. Institutional protocols and algorithms that pertain to the evaluation of patients at risk for COVID-19 are in a state of rapid change based on information released by regulatory bodies including the CDC and federal and state organizations. These policies and algorithms were followed during the patient's care in the ED. CHIEF COMPLAINT       Chief Complaint   Patient presents with    Hernia     super painful started yesterday       HISTORY OF PRESENT ILLNESS  (Location/Symptom, Timing/Onset, Context/Setting, Quality, Duration, Modifying Factors, Severity.)      Martha Lima is a 42 y.o. female who presents with progressively worsening epigastric abdominal pain whic hpatient believes is a hernia that has been reducible over the last 2 weeks but over the past 2 days has not been reducible no constipation, obstipation, cp, sob. Numerous prior abdominal surgeries.  Afebrile, no dysuria    PAST MEDICAL / SURGICAL / SOCIAL / FAMILY HISTORY      has a past medical history of Alcohol abuse, Anxiety, Arthritis, Painting esophagus, Benzodiazepine overdose, Bipolar disorder, unspecified ([address] Bipolar I disorder, most recent episode depressed, severe without psychotic features ([address] Cellulitis, Chronic abdominal wound infection, Constipation, Depression, Drug overdose, intentional ([address] Genital herpes, unspecified, GERD (gastroesophageal reflux disease), History of pulmonary embolism, Hx of blood clots, Hypertension, Iron deficiency anemia, Isopropyl alcohol poisoning, Lumbosacral spondylosis without myelopathy, MDRO (multiple drug resistant organisms) resistance, Miscarriage, Morbid obesity (HCC), MRSA (methicillin resistant staph aureus) culture positive, Overdose of benzodiazepine, Pernicious anemia, Primary hypercoagulable state ([facility] Pulmonary embolism ([facility] Suicidal behavior, Suicidal ideation, Suicide attempt ([facility] SVT (supraventricular tachycardia) ([facility] Toxic effect of ethanol, intentional self-harm ([facility] and Type 2 diabetes mellitus, with long-term current use of insulin ([facility] has a past surgical history that includes Cholecystectomy; Liver Resection; hernia repair; Tonsillectomy; Endoscopy, colon, diagnostic; Abdominal hernia repair (2014); Abdominal hernia repair (11/3/14); Bariatric Surgery (2013); Dilation and curettage of uterus (2005); Finger amputation (Left, 02/09/2015); lymph node biopsy (1990); yariel and bso (cervix removed) (2011); and IVC filter insertion. Social History     Socioeconomic History    Marital status:      Spouse name: Not on file    Number of children: 1    Years of education: 3 years of college    Highest education level: Not on file   Occupational History    Occupation: infant care     Comment: last worked 2008   Tobacco Use    Smoking status: Never Smoker    Smokeless tobacco: Never Used   Substance and Sexual Activity    Alcohol use: No     Alcohol/week: 0.0 standard drinks     Comment: Last alcohol use was in 12/2015    Drug use: No     Comment: Pt stole her mom's Benzo 1 yr ago. Pt said she took more than prescribed dose of Valium once in late 90's.     Sexual activity: Yes     Partners: Male   Other Topics Concern    Not on file   Social History Narrative    Lives with Trey Nava ex  and daughter          Social Determinants of Health     Financial Resource Strain:     Difficulty of Paying Living Expenses: Not on file Food Insecurity:     Worried About Running Out of Food in the Last Year: Not on file    Geronimo of Food in the Last Year: Not on file   Transportation Needs:     Lack of Transportation (Medical): Not on file    Lack of Transportation (Non-Medical):  Not on file   Physical Activity:     Days of Exercise per Week: Not on file    Minutes of Exercise per Session: Not on file   Stress:     Feeling of Stress : Not on file   Social Connections:     Frequency of Communication with Friends and Family: Not on file    Frequency of Social Gatherings with Friends and Family: Not on file    Attends Religion Services: Not on file    Active Member of [address] [place] [address] [employer] or Organizations: Not on file    Attends Club or Organization Meetings: Not on file    Marital Status: Not on file   Intimate Partner Violence:     Fear of Current or Ex-Partner: Not on file    Emotionally Abused: Not on file    Physically Abused: Not on file    Sexually Abused: Not on file   Housing Stability:     Unable to Pay for Housing in the Last Year: Not on file    Number of [place] in the Last Year: Not on file    Unstable Housing in the Last Year: Not on file       Family History   Problem Relation Age of Onset    Depression Mother     High Blood Pressure Mother     High Cholesterol Mother     Diabetes Father     Heart Disease Father     Kidney Disease Father     High Blood Pressure Father     High Cholesterol Father     Cancer Maternal Uncle         of duodenum had whipple    Breast Cancer Maternal Aunt     Breast Cancer Maternal Cousin        Allergies:  Asa [aspirin], Betadine [povidone iodine], Celexa [citalopram hydrobromide], Citalopram, Lasix [furosemide], Macrobid [nitrofurantoin], Norco [hydrocodone-acetaminophen], Betadine [povidone iodine], Codeine, Lithium, Paroxetine, Paxil [paroxetine hcl], Tape [adhesive tape], and Tegretol [carbamazepine]    Home Medications:  Prior to Admission medications    Medication Sig Start Date End Date Taking? Authorizing Provider   benazepril (LOTENSIN) 20 MG tablet TAKE 1 TABLET BY MOUTH DAILY 7/21/21   Radha Gutiérrez MD   famotidine (PEPCID) 20 MG tablet TAKE 1 TABLET BY MOUTH TWICE DAILY 4/22/21   MD MISHA SaleemOSTAR 300 UNIT/ML SOPN INJECT 70 UNITS INTO THE SKIN EVERY MORNING 3/30/21   Sandy Rogel MD   acyclovir (ZOVIRAX) 5 % ointment Apply topically every 3 hours x 10 days. 3/30/21   Radha Gutiérrez MD   fondaparinux (ARIXTRA) 10 MG/0.8ML SOLN injection Inject 0.8 mLs into the skin daily 3/30/21   Radha Gutiérrez MD   FLUoxetine (PROZAC) 20 MG capsule Take 1 capsule by mouth daily    Historical Provider, MD   cyanocobalamin 1000 MCG/ML injection Inject 1 mL into the muscle every 7 days 2/13/21   Radha Gutiérrez MD   Syringe/Needle, Disp, (SYRINGE 3CC/25GX1\") 25G X 1\" 3 ML MISC To be used with B12 injections monthly 2/13/21   Radha Gutiérrez MD   tiZANidine (ZANAFLEX) 4 MG tablet TAKE 1 TABLET BY MOUTH TWICE DAILY AS NEEDED FOR BACK PAIN 12/27/20   Radha Gutiérrez MD   ondansetron (ZOFRAN ODT) 4 MG disintegrating tablet Take 1 tablet by mouth every 8 hours as needed for Nausea 11/5/20   Vamsi Leger DO   FARXIGA 10 MG tablet TAKE 1 TABLET BY MOUTH EVERY MORNING 11/2/20   Radha Gutiérrez MD   pregabalin (LYRICA) 50 MG capsule Take 1 capsule by mouth 3 times daily for 7 days. 10/6/20 10/13/20  Sandy Rogel MD   sucralfate (CARAFATE) 1 GM tablet DISSOLVE 1 TABLET INTO 15 ML( 1 TABLESPOON) OF WATER AND DRINK BY MOUTH FOUR TIMES DAILY AS NEEDED FOR GERD 7/27/20   Radha Gutiérrez MD   dilTIAZem (DILTIAZEM CD) 240 MG extended release capsule Take 240 mg by mouth daily Take one capsule by mouth daily.     Historical Provider, MD   pravastatin (PRAVACHOL) 40 MG tablet TAKE 1 TABLET(40 MG) BY MOUTH DAILY 6/26/20   Sandy Rogel MD   ondansetron (ZOFRAN-ODT) 4 MG disintegrating tablet DISSOLVE ONE TABLET BY MOUTH THREE TIMES DAILY AS NEEDED FOR NAUSEA AND VOMITING 6/23/20   Tess Ellsworth MD   valACYclovir (VALTREX) 500 MG tablet Take 1 tablet by mouth daily 6/8/20   Tess Ellsworth MD   ondansetron (ZOFRAN) 4 MG tablet Take 1 tablet by mouth 3 times daily as needed for Nausea or Vomiting 4/27/20   Levi Smith MD   acetaminophen (TYLENOL) 500 MG tablet Take 2 tablets by mouth every 8 hours as needed for Pain 2/19/20   Ayana Weiner,    LORazepam (ATIVAN) 2 MG tablet  12/18/19   Historical Provider, MD   GLUCAGEN HYPOKIT 1 MG SOLR injection  12/19/19   Historical Provider, MD   glucose monitoring kit (FREESTYLE) monitoring kit Testing twice a day. Please dispense according to patients insurance. 12/20/19   Tess Ellsworth MD   Insulin Pen Needle 31G X 5 MM MISC 1 each by Does not apply route daily 12/20/19   Tess Ellsworth MD   Lancets 30G MISC Testing twice a day. Please dispense according to patients insurance. 12/20/19   Tess Ellsworth MD   blood glucose monitor strips Testing twice a day. Please dispense according to patients insurance. 12/20/19   Tess Ellsworth MD   KLOR-CON M10 10 MEQ extended release tablet Take 2 tablets by mouth daily as needed (take with bumex) 12/20/19   Tess Ellsworth MD   bumetanide (BUMEX) 0.5 MG tablet TAKE 1 TABLET BY MOUTH DAILY AS NEEDED FOR LEG SWELLING 12/20/19   Tess Ellsworth MD   lamoTRIgine (LAMICTAL) 200 MG tablet Take 200 mg by mouth daily    Historical Provider, MD   amphetamine-dextroamphetamine (ADDERALL, 30MG,) 30 MG tablet Take 30 mg by mouth daily.     Historical Provider, MD   dicyclomine (BENTYL) 10 MG capsule Take 1 capsule by mouth every 6 hours as needed (cramps) 11/3/19   Johny Pollard MD   QUEtiapine (SEROQUEL) 100 MG tablet Take 50 mg by mouth nightly as needed     Historical Provider, MD   Multiple Vitamins-Minerals (THERAPEUTIC MULTIVITAMIN-MINERALS) tablet Take 1 tablet by mouth daily    Historical Provider, MD   buPROPion [facility] XL) 300 MG extended release tablet Take 300 mg by mouth every morning    Historical Provider, MD   vitamin D (CHOLECALCIFEROL) 1000 UNIT TABS tablet Take 10,000 Units by mouth daily 5 days a week    Historical Provider, MD       REVIEW OF SYSTEMS    (2-9 systems for level 4, 10 or more for level 5)      Review of Systems   Constitutional: Negative for fever. HENT: Negative for congestion. Respiratory: Negative for shortness of breath. Cardiovascular: Negative for chest pain. Gastrointestinal: Positive for abdominal pain, nausea and vomiting. Genitourinary: Negative for dysuria. Musculoskeletal: Negative for back pain, myalgias and neck pain. Skin: Negative for rash. Allergic/Immunologic: Positive for environmental allergies and food allergies. Neurological: Negative for headaches. Psychiatric/Behavioral: Negative for agitation. PHYSICAL EXAM   (up to 7 for level 4, 8 or more for level 5)      INITIAL VITALS:   BP (!) 141/87   Pulse 124   Temp 97.1 °F (36.2 °C) (Oral)   Resp 20   Wt 280 lb (127 kg)   SpO2 96%   BMI 42.57 kg/m²     Physical Exam  Vitals and nursing note reviewed. Constitutional:       Appearance: She is obese. She is not toxic-appearing. HENT:      Head: Normocephalic. Right Ear: External ear normal.      Left Ear: External ear normal.      Mouth/Throat:      Pharynx: Oropharynx is clear. Eyes:      General: No scleral icterus. Conjunctiva/sclera: Conjunctivae normal.   Cardiovascular:      Rate and Rhythm: Regular rhythm. Tachycardia present. Pulses: Normal pulses. Pulmonary:      Effort: No respiratory distress. Abdominal:      Palpations: Abdomen is soft. Tenderness: There is abdominal tenderness. There is guarding. There is no rebound. Comments: Wall edema. Multiple surgical scars  No palpable hernia   Musculoskeletal:      Cervical back: Normal range of motion. Right lower leg: No edema. Left lower leg: No edema. Skin:     General: Skin is warm. Neurological:      Mental Status: She is alert and oriented to person, place, and time.    Psychiatric:         Mood and Affect: Mood normal.         DIFFERENTIAL  DIAGNOSIS     PLAN (LABS / IMAGING / EKG):  Orders Placed This Encounter   Procedures    CT ABDOMEN PELVIS W IV CONTRAST Additional Contrast? None    CBC WITH AUTO DIFFERENTIAL    COMPREHENSIVE METABOLIC PANEL    Lactic Acid, Plasma    HCG Qualitative, Serum    Lipase    URINALYSIS WITH MICROSCOPIC    Vital signs    Inpatient consult to General Surgery    EKG 12 Lead       MEDICATIONS ORDERED:  Orders Placed This Encounter   Medications    ondansetron (ZOFRAN) injection 4 mg    morphine injection 4 mg    0.9 % sodium chloride bolus    iopamidol (ISOVUE-370) 76 % injection 75 mL    morphine injection 4 mg    promethazine (PHENERGAN) injection 25 mg    fentaNYL (SUBLIMAZE) injection 50 mcg    dicyclomine (BENTYL) injection 20 mg    0.9 % sodium chloride bolus    DISCONTD: morphine injection 4 mg    cephALEXin (KEFLEX) capsule 500 mg     Order Specific Question:   Antimicrobial Indications     Answer:   Urinary Tract Infection    cephALEXin (KEFLEX) 500 MG capsule     Sig: Take 1 capsule by mouth 2 times daily for 7 days     Dispense:  14 capsule     Refill:  0       DDX: hernia, gastritis, pancreatitis, obstruction    DIAGNOSTIC RESULTS / EMERGENCY DEPARTMENT COURSE / MDM   LAB RESULTS:  Results for orders placed or performed during the hospital encounter of 12/31/21   CBC WITH AUTO DIFFERENTIAL   Result Value Ref Range    WBC 7.2 3.5 - 11.3 k/uL    RBC 4.41 3.95 - 5.11 m/uL    Hemoglobin 13.2 11.9 - 15.1 g/dL    Hematocrit 40.4 36.3 - 47.1 %    MCV 91.6 82.6 - 102.9 fL    MCH 29.9 25.2 - 33.5 pg    MCHC 32.7 28.4 - 34.8 g/dL    RDW 12.9 11.8 - 14.4 %    Platelets 902 278 - 091 k/uL    MPV 11.8 8.1 - 13.5 fL    NRBC Automated 0.0 0.0 per 100 WBC    Differential Type NOT REPORTED     Seg Neutrophils 66 (H) 36 - 65 %    Lymphocytes 24 24 - 43 %    Monocytes 6 3 - 12 %    Eosinophils % 3 1 - 4 %    Basophils 1 0 - 2 %    Immature Granulocytes 0 0 %    Segs Absolute 4.77 1.50 - 8.10 k/uL    Absolute Lymph # 1.69 1.10 - 3.70 k/uL    Absolute Mono # 0.46 0.10 - 1.20 k/uL    Absolute Eos # 0.18 0.00 - 0.44 k/uL    Basophils Absolute 0.05 0.00 - 0.20 k/uL    Absolute Immature Granulocyte <0.03 0.00 - 0.30 k/uL    WBC Morphology NOT REPORTED     RBC Morphology NOT REPORTED     Platelet Estimate NOT REPORTED    COMPREHENSIVE METABOLIC PANEL   Result Value Ref Range    Glucose 176 (H) 70 - 99 mg/dL    BUN 17 6 - 20 mg/dL    CREATININE 0.61 0.50 - 0.90 mg/dL    Bun/Cre Ratio NOT REPORTED 9 - 20    Calcium 8.9 8.6 - 10.4 mg/dL    Sodium 136 135 - 144 mmol/L    Potassium 4.2 3.7 - 5.3 mmol/L    Chloride 102 98 - 107 mmol/L    CO2 22 20 - 31 mmol/L    Anion Gap 12 9 - 17 mmol/L    Alkaline Phosphatase 161 (H) 35 - 104 U/L    ALT 40 (H) 5 - 33 U/L    AST 17 <32 U/L    Total Bilirubin 0.24 (L) 0.3 - 1.2 mg/dL    Total Protein 6.2 (L) 6.4 - 8.3 g/dL    Albumin 3.6 3.5 - 5.2 g/dL    Albumin/Globulin Ratio 1.4 1.0 - 2.5    GFR Non-African American >60 >60 mL/min    GFR African American >60 >60 mL/min    GFR Comment          GFR Staging NOT REPORTED    Lactic Acid, Plasma   Result Value Ref Range    Lactic Acid NOT REPORTED mmol/L    Lactic Acid, Whole Blood 1.6 0.7 - 2.1 mmol/L   HCG Qualitative, Serum   Result Value Ref Range    hCG Qual NEGATIVE NEGATIVE   Lipase   Result Value Ref Range    Lipase 19 13 - 60 U/L   URINALYSIS WITH MICROSCOPIC   Result Value Ref Range    Color, UA Yellow Yellow    Turbidity UA Clear Clear    Glucose, Ur 3+ (A) NEGATIVE    Bilirubin Urine NEGATIVE NEGATIVE    Ketones, Urine NEGATIVE NEGATIVE    Specific Gravity, UA 1.027 1.005 - 1.030    Urine Hgb NEGATIVE NEGATIVE    pH, UA 6.0 5.0 - 8.0    Protein, UA NEGATIVE NEGATIVE    Urobilinogen, Urine Normal Normal    Nitrite, Urine NEGATIVE NEGATIVE Leukocyte Esterase, Urine SMALL (A) NEGATIVE    -          WBC, UA 10 TO 20 0 - 5 /HPF    RBC, UA 0 TO 2 0 - 4 /HPF    Casts UA  0 - 8 /LPF     0 TO 2 HYALINE Reference range defined for non-centrifuged specimen. Crystals, UA NOT REPORTED None /HPF    Epithelial Cells UA 0 TO 2 0 - 5 /HPF    Renal Epithelial, UA NOT REPORTED 0 /HPF    Bacteria, UA MANY (A) None    Mucus, UA NOT REPORTED None    Trichomonas, UA NOT REPORTED None    Amorphous, UA NOT REPORTED None    Other Observations UA NOT REPORTED NOT REQ. Yeast, UA NOT REPORTED None   EKG 12 Lead   Result Value Ref Range    Ventricular Rate 101 BPM    Atrial Rate 101 BPM    P-R Interval 152 ms    QRS Duration 88 ms    Q-T Interval 356 ms    QTc Calculation (Bazett) 461 ms    P Axis 56 degrees    R Axis 44 degrees    T Axis 22 degrees       IMPRESSION: Is alert oriented obese 57-year-old female complaining of epigastric abdominal pain just to the right of the midline she is concerned presenting her and a palpable hernia on examination she has multiple hernias in the past numerous surgical interventions on her abdomen he is hypertensive and tachycardic on examination afebrile will obtain IV labs, IV fluids analgesia antiemetics CT abdomen pelvis consultation to general surgery    RADIOLOGY:  CT ABDOMEN PELVIS W IV CONTRAST Additional Contrast? None    Result Date: 12/31/2021  EXAMINATION: CT OF THE ABDOMEN AND PELVIS WITH CONTRAST 12/31/2021 6:15 pm TECHNIQUE: CT of the abdomen and pelvis was performed with the administration of intravenous contrast. Multiplanar reformatted images are provided for review. Dose modulation, iterative reconstruction, and/or weight based adjustment of the mA/kV was utilized to reduce the radiation dose to as low as reasonably achievable.  COMPARISON: CT abdomen and pelvis 08/04/2021 and 11/05/2020 HISTORY: ORDERING SYSTEM PROVIDED HISTORY: Presents describing painful hernia TECHNOLOGIST PROVIDED HISTORY: eval hernia Decision Support Exception - unselect if not a suspected or confirmed emergency medical condition->Emergency Medical Condition (MA) FINDINGS: Lower Chest: Visualized portions of the lungs are clear. Cardiac and posterior mediastinal structures visualized are unremarkable. Organs: Chronic partially peripherally calcified area along the ventral surface of the liver may be remote postsurgical.  Other portions of the liver appear unremarkable. Status post cholecystectomy with mild intra and extrahepatic bile duct dilatation, common duct measuring 11 mm diameter. Mild bilateral hydronephrosis without ureter calculus. Kidneys appear otherwise unremarkable. The adrenal glands, spleen and pancreas appear unremarkable. GI/Bowel: Postsurgical sequela about the stomach and proximal bowel typical of bariatric [place] surgery. No small bowel or colonic obstruction or acute inflammatory process evident. Short appearance of the appendix may be developmental or remnant stump status post prior appendectomy. Pelvis: Prominently dilated urinary bladder. No adenopathy, ascites or pneumoperitoneum. No distal ureter or urinary bladder calculus evident. Peritoneum/Retroperitoneum: IVC filter in place, prongs extending beyond the confines of the inferior vena cava as is commonly seen, no associated hematoma or fibrosis. Unremarkable appearance of the aorta. No aneurysm. No adenopathy or fluid. Bones/Soft Tissues: No acute superficial soft tissue or osseous structure abnormality evident. Rectus abdominus schism, with a broad mouthed, shallow, midline ventral hernia upper abdomen, unchanged from prior studies. No significant abdominal wall hernia is seen. 1. Prominently dilated urinary bladder with mild bilateral hydronephrosis. No ureter calculus evident. 2. Rectus abdominus schism, with a broad mouthed, shallow, midline ventral hernia upper abdomen, unchanged from prior studies. No significant abdominal wall hernia is seen.  3. Mild intra and extrahepatic bile duct dilatation status post cholecystectomy typical of reservoir effect. 4. Findings typical of [place] bariatric surgery; correlate with clinical history. 5.  Mild intra and extrahepatic bile duct dilatation status post cholecystectomy typical of reservoir effect. RECOMMENDATIONS: Unavailable       EKG  Tachycardia rate of 101 normal axis normal intervals  OK interval 152 QRS duration 80 ms poor R wave progression nonspecific ECG. All EKG's are interpreted by the Emergency Department Physician who either signs or Co-signs this chart in the absence of a cardiologist.    EMERGENCY DEPARTMENT COURSE:  Patient was seen and evaluated work-up was initiated with labs, urine, and imaging pending the results of this work-up care was transferred to Dr. Pelaez He:      CONSULTS:  Kelly Darden:      FINAL IMPRESSION      1. Abdominal pain, epigastric    2. Urinary tract infection in female          DISPOSITION / PLAN     DISPOSITION  care transferred to dr. Margarita Sung TO:  No follow-up provider specified.     DISCHARGE MEDICATIONS:  New Prescriptions    No medications on file       Dale Reed DO  Emergency Medicine Resident    (Please note that portions of thisnote were completed with a voice recognition program.  Efforts were made to edit the dictations but occasionally words are mis-transcribed.)       Dale Reed DO  Resident  01/01/22 2039

## 2021-12-31 NOTE — ED PROVIDER NOTES
[facility] [address] [facility]  Emergency Department  Emergency Medicine Resident Sign-out     Care of Abdiel Roger was assumed from Dr. Gigi Pearson and is being seen for Hernia (super painful started yesterday)  . The patient's initial evaluation and plan have been discussed with the prior provider who initially evaluated the patient. EMERGENCY DEPARTMENT COURSE / MEDICAL DECISION MAKING:       MEDICATIONS GIVEN:  Orders Placed This Encounter   Medications    ondansetron (ZOFRAN) injection 4 mg    morphine injection 4 mg    0.9 % sodium chloride bolus    iopamidol (ISOVUE-370) 76 % injection 75 mL    morphine injection 4 mg    promethazine (PHENERGAN) injection 25 mg    fentaNYL (SUBLIMAZE) injection 50 mcg    dicyclomine (BENTYL) injection 20 mg    0.9 % sodium chloride bolus    DISCONTD: morphine injection 4 mg    cephALEXin (KEFLEX) capsule 500 mg     Order Specific Question:   Antimicrobial Indications     Answer:   Urinary Tract Infection    cephALEXin (KEFLEX) 500 MG capsule     Sig: Take 1 capsule by mouth 2 times daily for 7 days     Dispense:  14 capsule     Refill:  0       LABS / RADIOLOGY:     Labs Reviewed   CBC WITH AUTO DIFFERENTIAL - Abnormal; Notable for the following components:       Result Value    Seg Neutrophils 66 (*)     All other components within normal limits   COMPREHENSIVE METABOLIC PANEL - Abnormal; Notable for the following components:    Glucose 176 (*)     Alkaline Phosphatase 161 (*)     ALT 40 (*)     Total Bilirubin 0.24 (*)     Total Protein 6.2 (*)     All other components within normal limits   URINALYSIS WITH MICROSCOPIC - Abnormal; Notable for the following components:    Glucose, Ur 3+ (*)     Leukocyte Esterase, Urine SMALL (*)     Bacteria, UA MANY (*)     All other components within normal limits   LACTIC ACID, PLASMA   HCG, SERUM, QUALITATIVE   LIPASE       CT ABDOMEN PELVIS W IV CONTRAST Additional Contrast? None   Final Result   1.  Prominently dilated urinary bladder with mild bilateral hydronephrosis. No ureter calculus evident. 2. Rectus abdominus schism, with a broad mouthed, shallow, midline ventral   hernia upper abdomen, unchanged from prior studies. No significant abdominal   wall hernia is seen. 3. Mild intra and extrahepatic bile duct dilatation status post   cholecystectomy typical of reservoir effect. 4. Findings typical of Awa-en-Y bariatric surgery; correlate with clinical   history. 5.  Mild intra and extrahepatic bile duct dilatation status post   cholecystectomy typical of reservoir effect. RECOMMENDATIONS:   Unavailable             RECENT VITALS:     Temp: 97.1 °F (36.2 °C),  Pulse: 91, Resp: 16, BP: 116/69, SpO2: 95 %      This patient is a 42 y.o. Female with epigastric abdominal tenderness and pain just to the right of the midline of her abdomen. Is been ongoing for approximately 1 to 2 weeks but acutely worse in the last 1 to 2 days patient is concerned that she has had has a hernia at that location she has had multiple hernias in the past she states that up until the last few days she was able to \"reduce this area however she has not been able to do so for the last 2 days passing gas does report some nausea vomiting and has had numerous abdominal surgeries mesh placement, Awa-en-Y, multiple hernias currently awaiting results of CT imaging likely general surgery consultation  --  Evaluated By general surgery due to diastases noted on CT scan. CT scan otherwise unremarkable for acute surgical pathology. General surgery recommends patient follow-up with hernia specialist at [facility] clinic due to extensive abdominal surgical history. I provided the patient with phone number for the specialists as well as phone number for [facility] clinic in case she is unable to schedule appointment with hernia specialist.  Patient indicated understanding and medically stable for discharge at this time.          OUTSTANDING TASKS / RECOMMENDATIONS:    1. F/u CT scan     FINAL IMPRESSION:     1. Abdominal pain, epigastric    2.  Urinary tract infection in female        DISPOSITION:         DISPOSITION:  [x]  Discharge   []  Transfer -    []  Admission -     []  Against Medical Advice   []  Eloped   FOLLOW-UP: MD Margot Springer 2 [facility]  [phone]    Schedule an appointment as soon as possible for a visit       [facility] ED  [address] [address]  [phone]  Go to   If symptoms worsen    [address] [facility] [address]  [address]  [phone]      As needed     DISCHARGE MEDICATIONS: Discharge Medication List as of 12/31/2021 10:23 PM      START taking these medications    Details   cephALEXin (KEFLEX) 500 MG capsule Take 1 capsule by mouth 2 times daily for 7 days, Disp-14 capsule, R-0Normal                Ebenezer Oden MD  Emergency Medicine Resident  [facility]     Ebenezer Oden MD  Resident  01/04/22 8514

## 2021-12-31 NOTE — ED NOTES
Bed: 39  Expected date:   Expected time:   Means of arrival:   Comments:     Frederic Hutson RN  12/31/21 8049

## 2021-12-31 NOTE — ED PROVIDER NOTES
[facility]  Emergency Department  Faculty Attestation     I performed a history and physical examination of the patient and discussed management with the resident. I reviewed the residents note and agree with the documented findings and plan of care. Any areas of disagreement are noted on the chart. I was personally present for the key portions of any procedures. I have documented in the chart those procedures where I was not present during the key portions. I have reviewed the emergency nurses triage note. I agree with the chief complaint, past medical history, past surgical history, allergies, medications, social and family history as documented unless otherwise noted below. For Physician Assistant/ Nurse Practitioner cases/documentation I have personally evaluated this patient and have completed at least one if not all key elements of the E/M (history, physical exam, and MDM). Additional findings are as noted. Primary Care Physician:  Dorothea Chambers    Screenings:  [unfilled]    CHIEF COMPLAINT       Chief Complaint   Patient presents with    Hernia     super painful started yesterday       RECENT VITALS:   Temp: 97.1 °F (36.2 °C),  Pulse: 124, Resp: 20, BP: (!) 141/87    LABS:  Labs Reviewed   CBC WITH AUTO DIFFERENTIAL   COMPREHENSIVE METABOLIC PANEL   LACTIC ACID, PLASMA   LIPASE       Radiology  CT CHEST ABDOMEN PELVIS W CONTRAST    (Results Pending)       CRITICAL CARE: There was a high probability of clinically significant/life threatening deterioration in this patient's condition which required my urgent intervention. Total critical care time was none minutes. This excludes any time for separately reportable procedures.      EKG:   EKG Interpretation    Interpreted by me    Rhythm: normal sinus   Rate: Tachycardia  Axis: normal  Ectopy: none  Conduction: normal  ST Segments subtle depression inferolaterally  T Waves: no acute change  Q Waves: none    Clinical Impression: Nonspecific ST changes, tachycardia    Attending Physician Additional  Notes    Patient can tell she has had a hernia in the upper abdomen, in the midline, with symptoms at least for the past 2 weeks which is worse with straining and lifting and bending over, now for the past 2 days extremely sensitive and unable to be reduced associate with 4 episodes of vomiting per day. She still passing gas and moving bowels. No constipation diarrhea or blood in the stools. No blood in the emesis. No fever chills or sweats. No chest pain shortness of breath. She has multiple prior abdominal surgeries initially with Awa-en-Y in 2013, liver biopsy, multiple abdominal wall surgeries including hernia repairs with mesh and other structures. She sees a hernia specialty surgeon. On exam she is in moderate distress secondary pain, pain score 8/10 intensity, tachycardic, hypertensive, shallow breathing noted. Lungs are clear. Abdomen is firm in the upper abdominal wall with guarding in the subxiphoid region with fascial defect but no palpable hernia on my exam.  Concern is for incarcerated hernia, dehydration, consider other cause. Plan is IV access, fluids, antiemetics, analgesics, labs, CT abdomen with contrast, consultation to general surgery. Diane Rodriguez.  Smitha Diamond MD, [facility]  Attending Emergency  Physician               Saint Cirri, MD  12/31/21 9448       Saint Cirri, MD  12/31/21 3961

## 2022-01-01 NOTE — CONSULTS
Consult - General Surgery    PATIENT NAME: Helder Luna  AGE: 42 y.o. MEDICAL RECORD NO. [id number]  DATE: 12/31/2021  SURGEON: Dr. Brandan Celis    Patient evaluated at the request of  Dr. Anahy Reddy  Reason for evaluation: \" Evaluation-history of multiple abdominal surgeries, severe abdominal pain\"    Patient information was obtained from patient. History/Exam limitations: none. Patient presented to the Emergency Department by private vehicle. IMPRESSION:   42 y.o. female with history of multiple abdominal surgeries for hernias now with diastases recti on CT abdomen      MEDICAL DECISION MAKING AND PLAN:     Reviewed the CT abdomen/pelvis in person. Discussed the patient, history, physical, images with the on-call attending. CT abdomen/pelvis with IV contrast consistent with diastases recti. Abdominal defect is broad without concern for incarceration. Patient continues to have bowel movements and pass gas, no concern for obstruction. No surgical intervention from a general surgery standpoint. Patient desires to be evaluated for complex abdominal reconstruction, will need referral to hernia specialist.  [place] [address] clinic. Dispo per ED      HISTORY:   History of Chief Complaint:    Helder Luna is a 42 y.o. female with history of multiple abdominal hernia surgeries including Awa-en-Y gastric bypass, multiple hernia surgeries, and a component separation most recently in 2014 with Dr. Kamilla Marrero in [place], presents complaining of abdominal pain. Patient reports she has had abdominal pain for the last 10 years. She also has nausea and emesis at baseline for this period of time. She states she came into the ED today because her pain has been worse for the last 2 days. She denies CP, SOB, F/C. Continues to have bowel movements and pass flatus. Patient does take Pepcid for GERD.   Patient underwent an EGD earlier this month with findings consistent for Fernandez Hansen MD   acyclovir (ZOVIRAX) 5 % ointment Apply topically every 3 hours x 10 days. 3/30/21   Fernandez Hansen MD   fondaparinux (ARIXTRA) 10 MG/0.8ML SOLN injection Inject 0.8 mLs into the skin daily 3/30/21   Fernandez Hansen MD   FLUoxetine (PROZAC) 20 MG capsule Take 1 capsule by mouth daily    Historical Provider, MD   cyanocobalamin 1000 MCG/ML injection Inject 1 mL into the muscle every 7 days 2/13/21   Fernandez Hansen MD   Syringe/Needle, Disp, (SYRINGE 3CC/25GX1\") 25G X 1\" 3 ML MISC To be used with B12 injections monthly 2/13/21   Fernandez Hansen MD   tiZANidine (ZANAFLEX) 4 MG tablet TAKE 1 TABLET BY MOUTH TWICE DAILY AS NEEDED FOR BACK PAIN 12/27/20   Fernandez Hansen MD   ondansetron (ZOFRAN ODT) 4 MG disintegrating tablet Take 1 tablet by mouth every 8 hours as needed for Nausea 11/5/20   Naasher Edwards DO   FARXIGA 10 MG tablet TAKE 1 TABLET BY MOUTH EVERY MORNING 11/2/20   Fernandez Hansen MD   pregabalin (LYRICA) 50 MG capsule Take 1 capsule by mouth 3 times daily for 7 days. 10/6/20 10/13/20  Sandy Rogel MD   sucralfate (CARAFATE) 1 GM tablet DISSOLVE 1 TABLET INTO 15 ML( 1 TABLESPOON) OF WATER AND DRINK BY MOUTH FOUR TIMES DAILY AS NEEDED FOR GERD 7/27/20   Fernandez Hansen MD   dilTIAZem (DILTIAZEM CD) 240 MG extended release capsule Take 240 mg by mouth daily Take one capsule by mouth daily.     Historical Provider, MD   pravastatin (PRAVACHOL) 40 MG tablet TAKE 1 TABLET(40 MG) BY MOUTH DAILY 6/26/20   Sandy Rogel MD   ondansetron (ZOFRAN-ODT) 4 MG disintegrating tablet DISSOLVE ONE TABLET BY MOUTH THREE TIMES DAILY AS NEEDED FOR NAUSEA AND VOMITING 6/23/20   Fernandez Hansen MD   valACYclovir (VALTREX) 500 MG tablet Take 1 tablet by mouth daily 6/8/20   Fernandez Hansen MD   ondansetron (ZOFRAN) 4 MG tablet Take 1 tablet by mouth 3 times daily as needed for Nausea or Vomiting 4/27/20   Braxton Phillips MD   acetaminophen (TYLENOL) 500 MG tablet Take 2 tablets by mouth every 8 hours as needed for Pain 2/19/20   Ayana Carter,    LORazepam (ATIVAN) 2 MG tablet  12/18/19   Historical Provider, MD Shannan Elizalde 1 MG SOLR injection  12/19/19   Historical Provider, MD   glucose monitoring kit (FREESTYLE) monitoring kit Testing twice a day. Please dispense according to patients insurance. 12/20/19   Stan Calvo MD   Insulin Pen Needle 31G X 5 MM MISC 1 each by Does not apply route daily 12/20/19   Stan Calvo MD   Lancets 30G MISC Testing twice a day. Please dispense according to patients insurance. 12/20/19   Stan Calvo MD   blood glucose monitor strips Testing twice a day. Please dispense according to patients insurance. 12/20/19   Stan Calvo MD   KLOR-CON M10 10 MEQ extended release tablet Take 2 tablets by mouth daily as needed (take with bumex) 12/20/19   Stan Calvo MD   bumetanide (BUMEX) 0.5 MG tablet TAKE 1 TABLET BY MOUTH DAILY AS NEEDED FOR LEG SWELLING 12/20/19   Stan Calvo MD   lamoTRIgine (LAMICTAL) 200 MG tablet Take 200 mg by mouth daily    Historical Provider, MD   amphetamine-dextroamphetamine (ADDERALL, 30MG,) 30 MG tablet Take 30 mg by mouth daily. Historical Provider, MD   dicyclomine (BENTYL) 10 MG capsule Take 1 capsule by mouth every 6 hours as needed (cramps) 11/3/19   Wai Holt MD   QUEtiapine (SEROQUEL) 100 MG tablet Take 50 mg by mouth nightly as needed     Historical Provider, MD   Multiple Vitamins-Minerals (THERAPEUTIC MULTIVITAMIN-MINERALS) tablet Take 1 tablet by mouth daily    Historical Provider, MD   buPROPion (WELLBUTRIN XL) 300 MG extended release tablet Take 300 mg by mouth every morning    Historical Provider, MD   vitamin D (CHOLECALCIFEROL) 1000 UNIT TABS tablet Take 10,000 Units by mouth daily 5 days a week    Historical Provider, MD    Scheduled Meds:   morphine  4 mg IntraVENous Once     Continuous Infusions:   PRN Meds:.   Allergies  is allergic to asa [aspirin], betadine [povidone iodine], celexa [citalopram hydrobromide], citalopram, lasix [furosemide], macrobid [nitrofurantoin], norco [hydrocodone-acetaminophen], betadine [povidone iodine], codeine, lithium, paroxetine, paxil [paroxetine hcl], tape [adhesive tape], and tegretol [carbamazepine]. Family History  family history includes Breast Cancer in her maternal aunt and maternal cousin; Cancer in her maternal uncle; Depression in her mother; Diabetes in her father; Heart Disease in her father; High Blood Pressure in her father and mother; High Cholesterol in her father and mother; Kidney Disease in her father. Social History   reports that she has never smoked. She has never used smokeless tobacco.   reports no history of alcohol use. reports no history of drug use. Review of Systems  General Denies any fever or chills  HEENT Denies any diplopia, tinnitus or vertigo  Resp Denies any shortness of breath, cough or wheezing  Cardiac Denies any chest pain, palpitations, claudication or edema  GI Denies any melena, hematochezia, hematemesis or pyrosis   Denies any frequency, urgency, hesitancy or incontinence  Heme Denies bruising or bleeding easily  Endocrine Denies any history of diabetes or thyroid disease  Neuro Denies any focal motor or sensory deficits    PHYSICAL:   VITALS:  weight is 280 lb (127 kg). Her oral temperature is 97.1 °F (36.2 °C). Her blood pressure is 130/80 and her pulse is 120. Her respiration is 20 and oxygen saturation is 95%. CONSTITUTIONAL: Alert and oriented times 3, no acute distress and cooperative to examination. HEENT: Head is normocephalic, atraumatic. EOMI, PERRLA  NECK: Soft, trachea midline and straight  LUNGS: Unlabored breathing on RA  CARDIOVASCULAR: RRR  ABDOMEN: Obese, soft, nondistended, no TTP. Surgical scars consistent with surgical history. No masses. Findings consistent with diastases recti in the epigastric region.   NEUROLOGIC: CN II-XII are grossly intact.  There are no focalizing motor or sensory deficits  EXTREMITIES: no cyanosis, clubbing or edema    LABS:     Recent Labs     12/31/21  1654   WBC 7.2   HGB 13.2   HCT 40.4         K 4.2      CO2 22   BUN 17   CREATININE 0.61   CALCIUM 8.9   AST 17   ALT 40*   BILITOT 0.24*     Recent Labs     12/31/21  1654   ALKPHOS 161*   ALT 40*   AST 17   BILITOT 0.24*   LABALBU 3.6   LIPASE 19     CBC with Differential:    Lab Results   Component Value Date    WBC 7.2 12/31/2021    RBC 4.41 12/31/2021    RBC 4.43 06/03/2012    HGB 13.2 12/31/2021    HCT 40.4 12/31/2021     12/31/2021     06/03/2012    MCV 91.6 12/31/2021    MCH 29.9 12/31/2021    MCHC 32.7 12/31/2021    RDW 12.9 12/31/2021    LYMPHOPCT 24 12/31/2021    MONOPCT 6 12/31/2021    BASOPCT 1 12/31/2021    MONOSABS 0.46 12/31/2021    LYMPHSABS 1.69 12/31/2021    EOSABS 0.18 12/31/2021    BASOSABS 0.05 12/31/2021    DIFFTYPE NOT REPORTED 12/31/2021     CMP:    Lab Results   Component Value Date     12/31/2021    K 4.2 12/31/2021     12/31/2021    CO2 22 12/31/2021    BUN 17 12/31/2021    CREATININE 0.61 12/31/2021    GFRAA >60 12/31/2021    LABGLOM >60 12/31/2021    GLUCOSE 176 12/31/2021    GLUCOSE 403 06/03/2012    PROT 6.2 12/31/2021    LABALBU 3.6 12/31/2021    LABALBU 3.9 06/03/2012    CALCIUM 8.9 12/31/2021    BILITOT 0.24 12/31/2021    ALKPHOS 161 12/31/2021    AST 17 12/31/2021    ALT 40 12/31/2021     BMP:    Lab Results   Component Value Date     12/31/2021    K 4.2 12/31/2021     12/31/2021    CO2 22 12/31/2021    BUN 17 12/31/2021    LABALBU 3.6 12/31/2021    LABALBU 3.9 06/03/2012    CREATININE 0.61 12/31/2021    CALCIUM 8.9 12/31/2021    GFRAA >60 12/31/2021    LABGLOM >60 12/31/2021    GLUCOSE 176 12/31/2021    GLUCOSE 403 06/03/2012     Urine Culture:  No components found for: CURINE  Blood Culture:  No components found for: CBLOOD, CFUNGUSBL    RADIOLOGY:   CT ABDOMEN PELVIS W IV CONTRAST Additional Contrast? None    Result Date: 12/31/2021  1. Prominently dilated urinary bladder with mild bilateral hydronephrosis. No ureter calculus evident. 2. Rectus abdominus schism, with a broad mouthed, shallow, midline ventral hernia upper abdomen, unchanged from prior studies. No significant abdominal wall hernia is seen. 3. Mild intra and extrahepatic bile duct dilatation status post cholecystectomy typical of reservoir effect. 4. Findings typical of Awa-en-Y bariatric surgery; correlate with clinical history. 5.  Mild intra and extrahepatic bile duct dilatation status post cholecystectomy typical of reservoir effect. RECOMMENDATIONS: Paz Spurling, DO  12/31/21, 9:52 PM        Attending Note    Patient seen in ED 39 as a general surgery consult for abdominal pain. She attests to longstanding abdominal pain of last 10 years, but that it has gotten worse over last several days. Of not she had bariatric (Awa en Y) surgery in past.  All labs and imaging are unremarkable. She had component separation surgery done about 8 years ago and has evidence of diastasis recti on CT. Endoscope earlier this year confirmed Barretts esophagus. Patient now states the pain has resolved. Patient is encouraged to follow up with her primary care physician for outpatient workup   of abdominal pain ant to track the Barrets esophagitis  I have reviewed the above TECSS note(s) and I either performed the key elements of the medical history and physical exam or was present with the resident when the key elements of the medical history and physical exam were performed.  I have discussed the findings, established the care plan and recommendations with Resident Kalpana Maguire MD  12/31/2021  10:14 PM

## 2022-01-03 LAB
EKG ATRIAL RATE: 101 BPM
EKG P AXIS: 56 DEGREES
EKG P-R INTERVAL: 152 MS
EKG Q-T INTERVAL: 356 MS
EKG QRS DURATION: 88 MS
EKG QTC CALCULATION (BAZETT): 461 MS
EKG R AXIS: 44 DEGREES
EKG T AXIS: 22 DEGREES
EKG VENTRICULAR RATE: 101 BPM

## 2022-01-03 PROCEDURE — 93010 ELECTROCARDIOGRAM REPORT: CPT | Performed by: INTERNAL MEDICINE

## 2022-02-01 ENCOUNTER — HOSPITAL ENCOUNTER (EMERGENCY)
Age: 42
Discharge: HOME | End: 2022-02-02
Attending: EMERGENCY MEDICINE
Payer: MEDICARE

## 2022-02-01 ENCOUNTER — APPOINTMENT (OUTPATIENT)
Dept: CT IMAGING | Age: 42
End: 2022-02-01
Payer: MEDICARE

## 2022-02-01 VITALS
SYSTOLIC BLOOD PRESSURE: 147 MMHG | DIASTOLIC BLOOD PRESSURE: 78 MMHG | WEIGHT: 280 LBS | HEART RATE: 82 BPM | RESPIRATION RATE: 16 BRPM | TEMPERATURE: 98 F | HEIGHT: 68 IN | OXYGEN SATURATION: 96 % | BODY MASS INDEX: 42.44 KG/M2

## 2022-02-01 DIAGNOSIS — R11.0 NAUSEA: ICD-10-CM

## 2022-02-01 DIAGNOSIS — R10.10 PAIN OF UPPER ABDOMEN: ICD-10-CM

## 2022-02-01 DIAGNOSIS — K22.70 BARRETT'S ESOPHAGUS WITHOUT DYSPLASIA: ICD-10-CM

## 2022-02-01 DIAGNOSIS — R11.2 NON-INTRACTABLE VOMITING WITH NAUSEA, UNSPECIFIED VOMITING TYPE: ICD-10-CM

## 2022-02-01 DIAGNOSIS — R10.84 GENERALIZED ABDOMINAL PAIN: Primary | ICD-10-CM

## 2022-02-01 LAB
ABSOLUTE EOS #: 0.35 K/UL (ref 0–0.44)
ABSOLUTE IMMATURE GRANULOCYTE: <0.03 K/UL (ref 0–0.3)
ABSOLUTE LYMPH #: 1.96 K/UL (ref 1.1–3.7)
ABSOLUTE MONO #: 0.64 K/UL (ref 0.1–1.2)
ALBUMIN SERPL-MCNC: 4 G/DL (ref 3.5–5.2)
ALBUMIN/GLOBULIN RATIO: 1.6 (ref 1–2.5)
ALP BLD-CCNC: 120 U/L (ref 35–104)
ALT SERPL-CCNC: 42 U/L (ref 5–33)
ANION GAP SERPL CALCULATED.3IONS-SCNC: 12 MMOL/L (ref 9–17)
AST SERPL-CCNC: 32 U/L
BASOPHILS # BLD: 1 % (ref 0–2)
BASOPHILS ABSOLUTE: 0.04 K/UL (ref 0–0.2)
BILIRUB SERPL-MCNC: <0.1 MG/DL (ref 0.3–1.2)
BILIRUBIN DIRECT: <0.08 MG/DL
BILIRUBIN, INDIRECT: ABNORMAL MG/DL (ref 0–1)
BUN BLDV-MCNC: 18 MG/DL (ref 6–20)
BUN/CREAT BLD: ABNORMAL (ref 9–20)
CALCIUM SERPL-MCNC: 9.1 MG/DL (ref 8.6–10.4)
CHLORIDE BLD-SCNC: 106 MMOL/L (ref 98–107)
CO2: 21 MMOL/L (ref 20–31)
CREAT SERPL-MCNC: 1.32 MG/DL (ref 0.5–0.9)
DIFFERENTIAL TYPE: ABNORMAL
EOSINOPHILS RELATIVE PERCENT: 5 % (ref 1–4)
GFR AFRICAN AMERICAN: 53 ML/MIN
GFR NON-AFRICAN AMERICAN: 44 ML/MIN
GFR SERPL CREATININE-BSD FRML MDRD: ABNORMAL ML/MIN/{1.73_M2}
GFR SERPL CREATININE-BSD FRML MDRD: ABNORMAL ML/MIN/{1.73_M2}
GLOBULIN: ABNORMAL G/DL (ref 1.5–3.8)
GLUCOSE BLD-MCNC: 172 MG/DL (ref 70–99)
HCT VFR BLD CALC: 37.9 % (ref 36.3–47.1)
HEMOGLOBIN: 12.1 G/DL (ref 11.9–15.1)
IMMATURE GRANULOCYTES: 0 %
LIPASE: 31 U/L (ref 13–60)
LYMPHOCYTES # BLD: 26 % (ref 24–43)
MCH RBC QN AUTO: 30.1 PG (ref 25.2–33.5)
MCHC RBC AUTO-ENTMCNC: 31.9 G/DL (ref 28.4–34.8)
MCV RBC AUTO: 94.3 FL (ref 82.6–102.9)
MONOCYTES # BLD: 8 % (ref 3–12)
NRBC AUTOMATED: 0 PER 100 WBC
PDW BLD-RTO: 14.1 % (ref 11.8–14.4)
PLATELET # BLD: ABNORMAL K/UL (ref 138–453)
PLATELET ESTIMATE: ABNORMAL
PLATELET, FLUORESCENCE: 262 K/UL (ref 138–453)
PLATELET, IMMATURE FRACTION: 7.4 % (ref 1.1–10.3)
PMV BLD AUTO: ABNORMAL FL (ref 8.1–13.5)
POTASSIUM SERPL-SCNC: 5 MMOL/L (ref 3.7–5.3)
RBC # BLD: 4.02 M/UL (ref 3.95–5.11)
RBC # BLD: ABNORMAL 10*6/UL
SEG NEUTROPHILS: 61 % (ref 36–65)
SEGMENTED NEUTROPHILS ABSOLUTE COUNT: 4.67 K/UL (ref 1.5–8.1)
SODIUM BLD-SCNC: 139 MMOL/L (ref 135–144)
TOTAL PROTEIN: 6.5 G/DL (ref 6.4–8.3)
WBC # BLD: 7.7 K/UL (ref 3.5–11.3)
WBC # BLD: ABNORMAL 10*3/UL

## 2022-02-01 PROCEDURE — 6360000004 HC RX CONTRAST MEDICATION: Performed by: GENERAL PRACTICE

## 2022-02-01 PROCEDURE — 2580000003 HC RX 258: Performed by: GENERAL PRACTICE

## 2022-02-01 PROCEDURE — 74177 CT ABD & PELVIS W/CONTRAST: CPT

## 2022-02-01 PROCEDURE — 80076 HEPATIC FUNCTION PANEL: CPT

## 2022-02-01 PROCEDURE — 6360000002 HC RX W HCPCS: Performed by: GENERAL PRACTICE

## 2022-02-01 PROCEDURE — 85055 RETICULATED PLATELET ASSAY: CPT

## 2022-02-01 PROCEDURE — 93005 ELECTROCARDIOGRAM TRACING: CPT | Performed by: GENERAL PRACTICE

## 2022-02-01 PROCEDURE — 96374 THER/PROPH/DIAG INJ IV PUSH: CPT

## 2022-02-01 PROCEDURE — 96372 THER/PROPH/DIAG INJ SC/IM: CPT

## 2022-02-01 PROCEDURE — 83690 ASSAY OF LIPASE: CPT

## 2022-02-01 PROCEDURE — 96375 TX/PRO/DX INJ NEW DRUG ADDON: CPT

## 2022-02-01 PROCEDURE — 96361 HYDRATE IV INFUSION ADD-ON: CPT

## 2022-02-01 PROCEDURE — 81001 URINALYSIS AUTO W/SCOPE: CPT

## 2022-02-01 PROCEDURE — 99283 EMERGENCY DEPT VISIT LOW MDM: CPT

## 2022-02-01 PROCEDURE — 80048 BASIC METABOLIC PNL TOTAL CA: CPT

## 2022-02-01 PROCEDURE — 85025 COMPLETE CBC W/AUTO DIFF WBC: CPT

## 2022-02-01 RX ORDER — FENTANYL CITRATE 50 UG/ML
50 INJECTION, SOLUTION INTRAMUSCULAR; INTRAVENOUS ONCE
Status: COMPLETED | OUTPATIENT
Start: 2022-02-01 | End: 2022-02-01

## 2022-02-01 RX ORDER — ONDANSETRON 4 MG/1
4 TABLET, ORALLY DISINTEGRATING ORAL EVERY 8 HOURS PRN
Qty: 20 TABLET | Refills: 0 | Status: SHIPPED | OUTPATIENT
Start: 2022-02-01 | End: 2022-06-02

## 2022-02-01 RX ORDER — PROMETHAZINE HYDROCHLORIDE 25 MG/ML
25 INJECTION, SOLUTION INTRAMUSCULAR; INTRAVENOUS ONCE
Status: COMPLETED | OUTPATIENT
Start: 2022-02-02 | End: 2022-02-01

## 2022-02-01 RX ORDER — 0.9 % SODIUM CHLORIDE 0.9 %
1000 INTRAVENOUS SOLUTION INTRAVENOUS ONCE
Status: COMPLETED | OUTPATIENT
Start: 2022-02-01 | End: 2022-02-01

## 2022-02-01 RX ORDER — MORPHINE SULFATE 4 MG/ML
4 INJECTION, SOLUTION INTRAMUSCULAR; INTRAVENOUS ONCE
Status: COMPLETED | OUTPATIENT
Start: 2022-02-01 | End: 2022-02-01

## 2022-02-01 RX ORDER — CEPHALEXIN 500 MG/1
500 CAPSULE ORAL 4 TIMES DAILY
Qty: 28 CAPSULE | Refills: 0 | Status: SHIPPED | OUTPATIENT
Start: 2022-02-01 | End: 2022-02-08

## 2022-02-01 RX ORDER — CEPHALEXIN 250 MG/1
500 CAPSULE ORAL ONCE
Status: DISCONTINUED | OUTPATIENT
Start: 2022-02-02 | End: 2022-02-01

## 2022-02-01 RX ORDER — ONDANSETRON 2 MG/ML
4 INJECTION INTRAMUSCULAR; INTRAVENOUS ONCE
Status: COMPLETED | OUTPATIENT
Start: 2022-02-01 | End: 2022-02-01

## 2022-02-01 RX ADMIN — MORPHINE SULFATE 4 MG: 4 INJECTION INTRAVENOUS at 23:18

## 2022-02-01 RX ADMIN — IOHEXOL 50 ML: 240 INJECTION, SOLUTION INTRATHECAL; INTRAVASCULAR; INTRAVENOUS; ORAL at 23:24

## 2022-02-01 RX ADMIN — ONDANSETRON 4 MG: 2 INJECTION INTRAMUSCULAR; INTRAVENOUS at 21:44

## 2022-02-01 RX ADMIN — SODIUM CHLORIDE 1000 ML: 9 INJECTION, SOLUTION INTRAVENOUS at 21:44

## 2022-02-01 RX ADMIN — IOPAMIDOL 75 ML: 755 INJECTION, SOLUTION INTRAVENOUS at 23:22

## 2022-02-01 RX ADMIN — PROMETHAZINE HYDROCHLORIDE 25 MG: 25 INJECTION INTRAMUSCULAR; INTRAVENOUS at 23:50

## 2022-02-01 RX ADMIN — FENTANYL CITRATE 50 MCG: 50 INJECTION, SOLUTION INTRAMUSCULAR; INTRAVENOUS at 21:44

## 2022-02-01 ASSESSMENT — PAIN DESCRIPTION - PAIN TYPE
TYPE: ACUTE PAIN;CHRONIC PAIN
TYPE: ACUTE PAIN;CHRONIC PAIN

## 2022-02-01 ASSESSMENT — PAIN SCALES - GENERAL
PAINLEVEL_OUTOF10: 8
PAINLEVEL_OUTOF10: 7
PAINLEVEL_OUTOF10: 9

## 2022-02-01 ASSESSMENT — PAIN DESCRIPTION - LOCATION
LOCATION: ABDOMEN
LOCATION: ABDOMEN

## 2022-02-01 ASSESSMENT — PAIN DESCRIPTION - ORIENTATION: ORIENTATION: MID

## 2022-02-02 LAB
-: NORMAL
AMORPHOUS: NORMAL
BACTERIA: NORMAL
BILIRUBIN URINE: NEGATIVE
CASTS UA: NORMAL /LPF (ref 0–8)
COLOR: YELLOW
COMMENT UA: ABNORMAL
CRYSTALS, UA: NORMAL /HPF
EPITHELIAL CELLS UA: NORMAL /HPF (ref 0–5)
GLUCOSE URINE: ABNORMAL
KETONES, URINE: NEGATIVE
LEUKOCYTE ESTERASE, URINE: ABNORMAL
MUCUS: NORMAL
NITRITE, URINE: NEGATIVE
OTHER OBSERVATIONS UA: NORMAL
PH UA: 5.5 (ref 5–8)
PROTEIN UA: NEGATIVE
RBC UA: NORMAL /HPF (ref 0–4)
RENAL EPITHELIAL, UA: NORMAL /HPF
SPECIFIC GRAVITY UA: 1.02 (ref 1–1.03)
TRICHOMONAS: NORMAL
TURBIDITY: CLEAR
URINE HGB: NEGATIVE
UROBILINOGEN, URINE: NORMAL
WBC UA: NORMAL /HPF (ref 0–5)
YEAST: NORMAL

## 2022-02-02 NOTE — ED PROVIDER NOTES
Faculty Sign-Out Attestation  Handoff taken on the following patient from prior Attending Physician: Opal Meyers    I was available and discussed any additional care issues that arose and coordinated the management plans with the resident(s) caring for the patient during my duty period. Any areas of disagreement with residents documentation of care or procedures are noted on the chart. I was personally present for the key portions of any/all procedures during my duty period. I have documented in the chart those procedures where I was not present during the key portions.     Abdominal pain, bariatric surgery at [facility]  Ct abdomen pending, >>     Myesha Barrera,   Attending Physician     Myesha Barrera, DO  02/01/22 2313    Ct stable, vss, confirming temp / afebrile,   Wbc stable, lipase 31  Will discharge     Myesha Barrera,   02/01/22 Maria Luisa Parsons, DO  02/01/22 2840

## 2022-02-02 NOTE — ED PROVIDER NOTES
[facility]  Emergency Department  Faculty Attestation     I performed a history and physical examination of the patient and discussed management with the resident. I reviewed the residents note and agree with the documented findings and plan of care. Any areas of disagreement are noted on the chart. I was personally present for the key portions of any procedures. I have documented in the chart those procedures where I was not present during the key portions. I have reviewed the emergency nurses triage note. I agree with the chief complaint, past medical history, past surgical history, allergies, medications, social and family history as documented unless otherwise noted below. For Physician Assistant/ Nurse Practitioner cases/documentation I have personally evaluated this patient and have completed at least one if not all key elements of the E/M (history, physical exam, and MDM). Additional findings are as noted. Primary Care Physician:  Lupe Dougherty    Screenings:  [unfilled]    CHIEF COMPLAINT       Chief Complaint   Patient presents with    Abdominal Pain     abd pain increased over the last week       RECENT VITALS:    ,  Pulse: 96,  , BP: (!) 178/98    LABS:  Labs Reviewed   CBC WITH AUTO DIFFERENTIAL   BASIC METABOLIC PANEL W/ REFLEX TO MG FOR LOW K   LIPASE   HEPATIC FUNCTION PANEL   URINE RT REFLEX TO CULTURE       Radiology  CT ABDOMEN PELVIS W IV CONTRAST Additional Contrast? Oral    (Results Pending)       CRITICAL CARE: There was a high probability of clinically significant/life threatening deterioration in this patient's condition which required my urgent intervention. Total critical care time was none minutes. This excludes any time for separately reportable procedures.      EKG:   EKG Interpretation    Interpreted by me    Rhythm: normal sinus   Rate: normal  Axis: normal  Ectopy: none  Conduction: normal  ST Segments: Subtle scooping depression inferiorly  T Waves: no acute change  Q Waves: none    Clinical Impression: Nonspecific ST changes    Attending Physician Additional  Notes    Patient's been having abdominal pain and vomiting initially intermittently for 2 weeks and now consistently in severe for the past week. No vomiting of blood or brown material. No relief with Phenergan. Occasionally she vomits up food that is nondigested and cold. She has multiple prior abdominal surgeries including Awa-en-Y surgery 7945 with complications of stapling to the abdominal wall, also prior abdominal surgeries including hernia repairs. No fevers. No diarrhea. No true chest pain no other radiation radiates to the lower substernal region. No difficulty breathing. On exam she is in moderate to severe distress, elevated pain score, tachycardic, hypertensive, afebrile, anicteric. Abdomen is protuberant, significant tenderness noted in the subxiphoid epigastrium with a small incisional hernia noted as well as a firmness with tenderness chest right lateral to this. Impression is severe abdominal pain, vomiting, dehydration, rule out bowel obstruction or volvulus or hernia. Plan is IV access, fluids, analgesics, antiemetics, CT imaging, labs, general surgery consultation, anticipate admission. Gregory Reed.  Ralf Trejo MD, [facility]  Attending Emergency  Physician               Asim Cotton MD  02/01/22 [id number]       Asim Cotton MD  02/01/22 [id number]

## 2022-02-02 NOTE — ED PROVIDER NOTES
[facility] ED  Emergency Department Encounter  EmergencyMedicine Resident     Pt Mary Navarrete  MRN: [id number]  [place] 1980  Date of evaluation: 2/1/22  PCP:  Jo Soriano       Chief Complaint   Patient presents with    Abdominal Pain     abd pain increased over the last week       HISTORY OF PRESENT ILLNESS  (Location/Symptom, Timing/Onset, Context/Setting, Quality, Duration, Modifying Factors, Severity.)      Jessica Swartz is a 42 y.o. female who presents with 4-day history of nausea and vomiting along with feelings of abdominal distention and abdominal pain. Patient has a history of Awa-en-Y gastric bypass along with multiple surgeries for hernias in the past.  Patient states she had a \"falling out\" with her bariatric surgeon in [facility] [place], and has not seen him since 2013 when the surgery was performed. She does not follow with any bariatric surgeon here in the local area.     PAST MEDICAL / SURGICAL / SOCIAL / FAMILY HISTORY      has a past medical history of Alcohol abuse, Anxiety, Arthritis, Painting esophagus, Benzodiazepine overdose, Bipolar disorder, unspecified ([address] Bipolar I disorder, most recent episode depressed, severe without psychotic features ([address] Cellulitis, Chronic abdominal wound infection, Constipation, Depression, Drug overdose, intentional ([address] Genital herpes, unspecified, GERD (gastroesophageal reflux disease), History of pulmonary embolism, Hx of blood clots, Hypertension, Iron deficiency anemia, Isopropyl alcohol poisoning, Lumbosacral spondylosis without myelopathy, MDRO (multiple drug resistant organisms) resistance, Miscarriage, Morbid obesity ([address] MRSA (methicillin resistant staph aureus) culture positive, Overdose of benzodiazepine, Pernicious anemia, Primary hypercoagulable state ([address] Pulmonary embolism ([address] Suicidal behavior, Suicidal ideation, Suicide attempt ([address] SVT (supraventricular tachycardia) ([address] Toxic effect of ethanol, intentional self-harm ([facility] [address] and Type 2 diabetes mellitus, with long-term current use of insulin ([facility] [address] Denies further past medical hx     has a past surgical history that includes Cholecystectomy; Liver Resection; hernia repair; Tonsillectomy; Endoscopy, colon, diagnostic; Abdominal hernia repair (2014); Abdominal hernia repair (11/3/14); Bariatric Surgery (2013); Dilation and curettage of uterus (2005); Finger amputation (Left, 02/09/2015); lymph node biopsy (1990); yariel and bso (cervix removed) (2011); and IVC filter insertion. Denies further past surgical hx    Social History     Socioeconomic History    Marital status:      Spouse name: Not on file    Number of children: 1    Years of education: 3 years of college    Highest education level: Not on file   Occupational History    Occupation: infant care     Comment: last worked 2008   Tobacco Use    Smoking status: Never Smoker    Smokeless tobacco: Never Used   Substance and Sexual Activity    Alcohol use: No     Alcohol/week: 0.0 standard drinks     Comment: Last alcohol use was in 12/2015    Drug use: No     Comment: Pt stole her mom's Benzo 1 yr ago. Pt said she took more than prescribed dose of Valium once in late 90's.  Sexual activity: Yes     Partners: Male   Other Topics Concern    Not on file   Social History Narrative    Lives with Belinda Skinner ex  and daughter          Social Determinants of Health     Financial Resource Strain:     Difficulty of Paying Living Expenses: Not on file   Food Insecurity:     Worried About Running Out of Food in the Last Year: Not on file    G[place]imo of Food in the Last Year: Not on file   Transportation Needs:     Lack of Transportation (Medical): Not on file    Lack of Transportation (Non-Medical):  Not on file   Physical Activity:     Days of Exercise per Week: Not on file    Minutes of Exercise per Session: Not on file   Stress:     Feeling of Stress : Not on file Social Connections:     Frequency of Communication with Friends and Family: Not on file    Frequency of Social Gatherings with Friends and Family: Not on file    Attends Religion Services: Not on file    Active Member of Clubs or Organizations: Not on file    Attends Club or Organization Meetings: Not on file    Marital Status: Not on file   Intimate Partner Violence:     Fear of Current or Ex-Partner: Not on file    Emotionally Abused: Not on file    Physically Abused: Not on file    Sexually Abused: Not on file   Housing Stability:     Unable to Pay for Housing in the Last Year: Not on file    Number of [place] in the Last Year: Not on file    Unstable Housing in the Last Year: Not on file       Family History   Problem Relation Age of Onset    Depression Mother     High Blood Pressure Mother     High Cholesterol Mother     Diabetes Father     Heart Disease Father     Kidney Disease Father     High Blood Pressure Father     High Cholesterol Father     Cancer Maternal Uncle         of duodenum had whipple    Breast Cancer Maternal Aunt     Breast Cancer Maternal Cousin        Allergies:  Asa [aspirin], Betadine [povidone iodine], Celexa [citalopram hydrobromide], Citalopram, Lasix [furosemide], Macrobid [nitrofurantoin], Norco [hydrocodone-acetaminophen], Betadine [povidone iodine], Codeine, Lithium, Paroxetine, Paxil [paroxetine hcl], Tape [adhesive tape], and Tegretol [carbamazepine]    Home Medications:  Prior to Admission medications    Medication Sig Start Date End Date Taking?  Authorizing Provider   ondansetron (ZOFRAN ODT) 4 MG disintegrating tablet Take 1 tablet by mouth every 8 hours as needed for Nausea 2/1/22  Yes Rosina Chilel DO   cephALEXin (KEFLEX) 500 MG capsule Take 1 capsule by mouth 4 times daily for 7 days 2/1/22 2/8/22 Yes Verónica Menon DO   benazepril (LOTENSIN) 20 MG tablet TAKE 1 TABLET BY MOUTH DAILY 7/21/21   Ramos Mojica MD   famotidine (PEPCID) 20 MG tablet TAKE 1 TABLET BY MOUTH TWICE DAILY 4/22/21   MD SRI SaleemO SOLOSTAR 300 UNIT/ML SOPN INJECT 70 UNITS INTO THE SKIN EVERY MORNING 3/30/21   Traci Tristan MD   acyclovir (ZOVIRAX) 5 % ointment Apply topically every 3 hours x 10 days. 3/30/21   Traci Tristan MD   fondaparinux (ARIXTRA) 10 MG/0.8ML SOLN injection Inject 0.8 mLs into the skin daily 3/30/21   Traci Tristan MD   FLUoxetine (PROZAC) 20 MG capsule Take 1 capsule by mouth daily    Historical Provider, MD   cyanocobalamin 1000 MCG/ML injection Inject 1 mL into the muscle every 7 days 2/13/21   Traci Tristan MD   Syringe/Needle, Disp, (SYRINGE 3CC/25GX1\") 25G X 1\" 3 ML MISC To be used with B12 injections monthly 2/13/21   Traci Tristan MD   tiZANidine (ZANAFLEX) 4 MG tablet TAKE 1 TABLET BY MOUTH TWICE DAILY AS NEEDED FOR BACK PAIN 12/27/20   Traci Tristan MD   FARXIGA 10 MG tablet TAKE 1 TABLET BY MOUTH EVERY MORNING 11/2/20   Traci Tristan MD   pregabalin (LYRICA) 50 MG capsule Take 1 capsule by mouth 3 times daily for 7 days. 10/6/20 10/13/20  Sandy Rogel MD   sucralfate (CARAFATE) 1 GM tablet DISSOLVE 1 TABLET INTO 15 ML( 1 TABLESPOON) OF WATER AND DRINK BY MOUTH FOUR TIMES DAILY AS NEEDED FOR GERD 7/27/20   Traci Tristan MD   dilTIAZem (DILTIAZEM CD) 240 MG extended release capsule Take 240 mg by mouth daily Take one capsule by mouth daily.     Historical Provider, MD   pravastatin (PRAVACHOL) 40 MG tablet TAKE 1 TABLET(40 MG) BY MOUTH DAILY 6/26/20   Traci Tristan MD   valACYclovir (VALTREX) 500 MG tablet Take 1 tablet by mouth daily 6/8/20   Traci Tristan MD   ondansetron (ZOFRAN) 4 MG tablet Take 1 tablet by mouth 3 times daily as needed for Nausea or Vomiting 4/27/20   Ericka Thompson MD   acetaminophen (TYLENOL) 500 MG tablet Take 2 tablets by mouth every 8 hours as needed for Pain 2/19/20   Ayana Mijares, DO   LORazepam (ATIVAN) 2 MG tablet  12/18/19 Historical Provider, MD   GLUCAGEN HYPOKIT 1 MG SOLR injection  12/19/19   Historical Provider, MD   glucose monitoring kit (FREESTYLE) monitoring kit Testing twice a day. Please dispense according to patients insurance. 12/20/19   Lorenzo Canseco MD   Insulin Pen Needle 31G X 5 MM MISC 1 each by Does not apply route daily 12/20/19   Lorenzo Canseco MD   Lancets 30G MISC Testing twice a day. Please dispense according to patients insurance. 12/20/19   Lorenzo Canseco MD   blood glucose monitor strips Testing twice a day. Please dispense according to patients insurance. 12/20/19   Lorenzo Canseco MD   KLOR-CON M10 10 MEQ extended release tablet Take 2 tablets by mouth daily as needed (take with bumex) 12/20/19   Lorenzo Canseco MD   bumetanide (BUMEX) 0.5 MG tablet TAKE 1 TABLET BY MOUTH DAILY AS NEEDED FOR LEG SWELLING 12/20/19   Lorenzo Canseco MD   lamoTRIgine (LAMICTAL) 200 MG tablet Take 200 mg by mouth daily    Historical Provider, MD   amphetamine-dextroamphetamine (ADDERALL, 30MG,) 30 MG tablet Take 30 mg by mouth daily. Historical Provider, MD   dicyclomine (BENTYL) 10 MG capsule Take 1 capsule by mouth every 6 hours as needed (cramps) 11/3/19   Lizet Miller MD   QUEtiapine (SEROQUEL) 100 MG tablet Take 50 mg by mouth nightly as needed     Historical Provider, MD   Multiple Vitamins-Minerals (THERAPEUTIC MULTIVITAMIN-MINERALS) tablet Take 1 tablet by mouth daily    Historical Provider, MD   buPROPion (WELLBUTRIN XL) 300 MG extended release tablet Take 300 mg by mouth every morning    Historical Provider, MD   vitamin D (CHOLECALCIFEROL) 1000 UNIT TABS tablet Take 10,000 Units by mouth daily 5 days a week    Historical Provider, MD       REVIEW OF SYSTEMS    (2-9 systems for level 4, 10 or more for level 5)      Review of Systems    Review of Systems   Constitutional: Negative for chills and fever. HENT: Negative for sore throat. Eyes: Negative for pain.    Respiratory: Negative for cough. Cardiovascular: Negative for chest pain and palpitations. Gastrointestinal: Positive for nausea and vomiting and abdominal pain  Genitourinary: Negative for dysuria. Musculoskeletal: Negative for gait problem. Skin: Negative for wound. Neurological: Negative for headaches. PHYSICAL EXAM   (up to 7 for level 4, 8 or more for level 5)      INITIAL VITALS:   BP (!) 147/78   Pulse 82   Temp 98 °F (36.7 °C) (Oral)   Resp 16   Ht 5' 8\" (1.727 m)   Wt 280 lb (127 kg)   SpO2 96%   BMI 42.57 kg/m²     Physical Exam   Gen. Appearance: patient appears well, nondistressed. Head: head atraumatic, normocephalic. Eyes: Extraocular movements intact. No scleral icterus  Mouth: Oropharynx clear and moist.  No oral lesions  Neck: Supple. No lymphadenopathy. Pulmonary: Lungs clear to auscultation bilaterally. No wheezing, rales or rhonchi   Cardiovascular: Regular rate and rhythm, no murmurs   Abdomen: Morbidly obese, multiple surgical scars. Soft, no rebound tenderness no guarding. Normal bowel sounds  Neurology: GCS 15. Oriented to person place and time.   moving all extremities   Skin: Warm, dry, well perfused        DIFFERENTIAL  DIAGNOSIS     PLAN (LABS / Oley Engman / EKG):  Orders Placed This Encounter   Procedures    CT ABDOMEN PELVIS W IV CONTRAST Additional Contrast? Oral    Urinalysis Reflex to Culture    Basic Metabolic Panel w/ Reflex to MG    CBC Auto Differential    Lipase    Hepatic Function Panel    PREVIOUS SPECIMEN    Immature Platelet Fraction    EKG 12 Lead       MEDICATIONS ORDERED:  Orders Placed This Encounter   Medications    0.9 % sodium chloride bolus    ondansetron (ZOFRAN) injection 4 mg    fentaNYL (SUBLIMAZE) injection 50 mcg    iohexol (OMNIPAQUE 240) injection 50 mL    morphine injection 4 mg    iopamidol (ISOVUE-370) 76 % injection 75 mL    promethazine (PHENERGAN) injection 25 mg    ondansetron (ZOFRAN ODT) 4 MG disintegrating tablet Sig: Take 1 tablet by mouth every 8 hours as needed for Nausea     Dispense:  20 tablet     Refill:  0    DISCONTD: cephALEXin (KEFLEX) capsule 500 mg     Order Specific Question:   Antimicrobial Indications     Answer:   Urinary Tract Infection    cephALEXin (KEFLEX) 500 MG capsule     Sig: Take 1 capsule by mouth 4 times daily for 7 days     Dispense:  28 capsule     Refill:  0           DIAGNOSTIC RESULTS / EMERGENCY DEPARTMENT COURSE / MDM     LABS:  Results for orders placed or performed during the hospital encounter of 95/76/77   Basic Metabolic Panel w/ Reflex to MG   Result Value Ref Range    Glucose 172 (H) 70 - 99 mg/dL    BUN 18 6 - 20 mg/dL    CREATININE 1.32 (H) 0.50 - 0.90 mg/dL    Bun/Cre Ratio NOT REPORTED 9 - 20    Calcium 9.1 8.6 - 10.4 mg/dL    Sodium 139 135 - 144 mmol/L    Potassium 5.0 3.7 - 5.3 mmol/L    Chloride 106 98 - 107 mmol/L    CO2 21 20 - 31 mmol/L    Anion Gap 12 9 - 17 mmol/L    GFR Non-African American 44 (L) >60 mL/min    GFR  53 (L) >60 mL/min    GFR Comment          GFR Staging NOT REPORTED    CBC Auto Differential   Result Value Ref Range    WBC 7.7 3.5 - 11.3 k/uL    RBC 4.02 3.95 - 5.11 m/uL    Hemoglobin 12.1 11.9 - 15.1 g/dL    Hematocrit 37.9 36.3 - 47.1 %    MCV 94.3 82.6 - 102.9 fL    MCH 30.1 25.2 - 33.5 pg    MCHC 31.9 28.4 - 34.8 g/dL    RDW 14.1 11.8 - 14.4 %    Platelets See Reflexed IPF Result 138 - 453 k/uL    MPV NOT REPORTED 8.1 - 13.5 fL    NRBC Automated 0.0 0.0 per 100 WBC    Differential Type NOT REPORTED     WBC Morphology NOT REPORTED     RBC Morphology NOT REPORTED     Platelet Estimate NOT REPORTED     Seg Neutrophils 61 36 - 65 %    Lymphocytes 26 24 - 43 %    Monocytes 8 3 - 12 %    Eosinophils % 5 (H) 1 - 4 %    Basophils 1 0 - 2 %    Immature Granulocytes 0 0 %    Segs Absolute 4.67 1.50 - 8.10 k/uL    Absolute Lymph # 1.96 1.10 - 3.70 k/uL    Absolute Mono # 0.64 0.10 - 1.20 k/uL    Absolute Eos # 0.35 0.00 - 0.44 k/uL Basophils Absolute 0.04 0.00 - 0.20 k/uL    Absolute Immature Granulocyte <0.03 0.00 - 0.30 k/uL   Lipase   Result Value Ref Range    Lipase 31 13 - 60 U/L   Hepatic Function Panel   Result Value Ref Range    Albumin 4.0 3.5 - 5.2 g/dL    Alkaline Phosphatase 120 (H) 35 - 104 U/L    ALT 42 (H) 5 - 33 U/L    AST 32 (H) <32 U/L    Total Bilirubin <0.10 (L) 0.3 - 1.2 mg/dL    Bilirubin, Direct <0.08 <0.31 mg/dL    Bilirubin, Indirect Can not be calculated 0.00 - 1.00 mg/dL    Total Protein 6.5 6.4 - 8.3 g/dL    Globulin NOT REPORTED 1.5 - 3.8 g/dL    Albumin/Globulin Ratio 1.6 1.0 - 2.5   Immature Platelet Fraction   Result Value Ref Range    Platelet, Immature Fraction 7.4 1.1 - 10.3 %    Platelet, Fluorescence 262 138 - 453 k/uL       RADIOLOGY:  None    EKG  None    All EKG's are interpreted by the Emergency Department Physician who either signs or Co-signs this chart in the absence of a cardiologist.    [facility] MDM:  42 y.o. female who presents with complaints of nausea and vomiting. Prior history of Awa-en-Y gastric bypass in 2013. Does not follow with bariatrics here. Work-up here unremarkable including CT abdomen pelvis with IV and oral contrast.  Patient has not vomited since arrival.  Treated with normal saline, Zofran, and Phenergan. Abdomen is soft. Recommend patient follow-up with primary care provider                PROCEDURES:  None    CONSULTS:  None    CRITICAL CARE:  None    FINAL IMPRESSION      1. Generalized abdominal pain    2. Nausea    3. Non-intractable vomiting with nausea, unspecified vomiting type    4. Pianting's esophagus without dysplasia    5. Pain of upper abdomen              DISPOSITION / PLAN     DISPOSITION Decision To Discharge 02/01/2022 11:46:13 PM      PATIENT REFERRED TO:  No follow-up provider specified.     DISCHARGE MEDICATIONS:  New Prescriptions    CEPHALEXIN (KEFLEX) 500 MG CAPSULE    Take 1 capsule by mouth 4 times daily for 7 days    ONDANSETRON (ZOFRAN ODT) 4 MG DISINTEGRATING TABLET    Take 1 tablet by mouth every 8 hours as needed for Nausea       Kennedi Sanchez DO  Emergency Medicine Resident    (Please note that portions of thisnote were completed with a voice recognition program.  Efforts were made to edit the dictations but occasionally words are mis-transcribed.)        Kennedi Sanchez DO  Resident  02/01/22 [id number]

## 2022-02-03 ENCOUNTER — CARE COORDINATION (OUTPATIENT)
Dept: CARE COORDINATION | Age: 42
End: 2022-02-03

## 2022-02-03 NOTE — CARE COORDINATION
Ambulatory Care Coordination ED COVID Follow up Call    Challenges to be reviewed by the provider   Additional needs identified to be addressed with provider: No  none                 Encounter was not routed to provider for escalation. Method of communication with provider: none    Discussed COVID-19 related testing which was: not done at this time. Test results were: not done. Patient informed of results, if available? n/a. Current Symptoms: no new symptoms and no worsening symptoms    Reviewed New or Changed Meds: yes    Do you have what you need at home?  Durable Medical Equipment ordered at discharge: None   Home Health/Outpatient orders at discharge: none   Was patient discharged with a pulse oximeter? No Discussed and confirmed pulse oximeter discharge instructions and when to notify provider or seek emergency care. Patient education provided: Reviewed appropriate site of care based on symptoms and resources available to patient including: PCP, Specialist, Urgent care clinics, When to call 911, [place] [facility] Messaging and Condition related references. Follow up appointment recommended: yes. If no appointment scheduled, scheduling offered: yes  No future appointments. Interventions: Obtained and reviewed discharge summary and/or continuity of care documents  Education of patient/family/caregiver/guardian to support self-management-. Assessment and support for treatment adherence and medication management-. Reviewed discharge instructions, medical action plan and red flags with patient who verbalized understanding. No further follow-up call indicated based on severity of symptoms and risk factors. Plan for next call: none  Provided contact information for future needs. Aroldo Belle RN     Emergency Preparedness: Patient/Caregiver Instructed in the following recommendations:     Have one gallon of water per person for at least 3 days on hand.    Have non-perishable (diet appropriate) food and manual can opener for at least 3 days that do not need to be cooked. Including pet food.  Have flashlights and batteries.  Charge your cell phones or rechargeable batteries for your cell phones.  Have 3+ days of back up oxygen in your home, if applicable.  Have a phone in your home that is hard wired & does not require power.  Have medication for a week in your home. Have medication in place for easy access & in zip lock bag for protection.  Have cooler with ice for medications that need to be refrigerated.  Make sure you have a caregiver in the home to provide care in case your home health nurse cannot get to your house.  Make sure you have all of your paperwork: ID; insurance cards; provider, pharmacy & medical equipment provider contact information; home health agency phone number, etc.   Call home service providers if you relocate so they can contact you.

## 2022-02-04 LAB
EKG ATRIAL RATE: 88 BPM
EKG P AXIS: 63 DEGREES
EKG P-R INTERVAL: 146 MS
EKG Q-T INTERVAL: 384 MS
EKG QRS DURATION: 94 MS
EKG QTC CALCULATION (BAZETT): 464 MS
EKG R AXIS: 56 DEGREES
EKG T AXIS: 23 DEGREES
EKG VENTRICULAR RATE: 88 BPM

## 2022-02-04 PROCEDURE — 93010 ELECTROCARDIOGRAM REPORT: CPT | Performed by: INTERNAL MEDICINE

## 2022-03-02 NOTE — TELEPHONE ENCOUNTER
"  S-(situation): Pt calling with questions.     B-(background): [facility], was told to monitor temp and if above 99F to call PCP. [facility] noted [facility] auscultated wheezing in lower lobes    A-(assessment): Pt noted symptoms started last Thursday with drainage, turned to cough on Saturday. Cough has continued today. Pt denies feeling feverish, chills, muscle aches, SOB, CP, HA, sinus pain. Pt reports productive cough with unknown color of phlegm, noted cough that turns into coughing fits, and low grade fever of 99.6F.     R-(recommendations): Pt advised home care treatments, mucinex, increase liquids, tylenol, pseudoephedrine. Advised if continues to be seen Monday in clinic, advised sooner if develops SOB, difficulty breathing, fever not reduced with tylenol.    Patient stated an understanding and agreed with plan.    Jordon CHAVEZ RN   [facility] Triage        Reason for Disposition    Cough with cold symptoms (e.g., runny nose, postnasal drip, throat clearing)    Answer Assessment - Initial Assessment Questions  1. ONSET: \"When did the cough begin?\"       saturday  2. SEVERITY: \"How bad is the cough today?\"       mild  3. RESPIRATORY DISTRESS: \"Describe your breathing.\"       Lungs feel tired  4. FEVER: \"Do you have a fever?\" If so, ask: \"What is your temperature, how was it measured, and when did it start?\"      99.6F  5. SPUTUM: \"Describe the color of your sputum\" (clear, white, yellow, green)      unknown  6. HEMOPTYSIS: \"Are you coughing up any blood?\" If so ask: \"How much?\" (flecks, streaks, tablespoons, etc.)      no  7. CARDIAC HISTORY: \"Do you have any history of heart disease?\" (e.g., heart attack, congestive heart failure)       no  8. LUNG HISTORY: \"Do you have any history of lung disease?\"  (e.g., pulmonary embolus, asthma, emphysema)      none  9. PE RISK FACTORS: \"Do you have a history of blood clots?\" (or: recent major surgery, recent prolonged travel, " I called patient to let her know that the Prior Auth on the Acyclovir ointment was denied, so I called to appeal it, they said a decision would be made within 7 days and would fax the office with the decision. "bedridden)      no  10. OTHER SYMPTOMS: \"Do you have any other symptoms?\" (e.g., runny nose, wheezing, chest pain)        Stuffy nose,   11. PREGNANCY: \"Is there any chance you are pregnant?\" \"When was your last menstrual period?\"        no  12. TRAVEL: \"Have you traveled out of the country in the last month?\" (e.g., travel history, exposures)        No    Protocols used: COUGH - ACUTE [id number]      "

## 2022-03-05 ENCOUNTER — HOSPITAL ENCOUNTER (EMERGENCY)
Age: 42
Discharge: HOME | End: 2022-03-05
Attending: STUDENT IN AN ORGANIZED HEALTH CARE EDUCATION/TRAINING PROGRAM
Payer: MEDICARE

## 2022-03-05 VITALS
SYSTOLIC BLOOD PRESSURE: 170 MMHG | DIASTOLIC BLOOD PRESSURE: 95 MMHG | RESPIRATION RATE: 18 BRPM | TEMPERATURE: 98.6 F | HEART RATE: 116 BPM | OXYGEN SATURATION: 99 %

## 2022-03-05 DIAGNOSIS — R45.851 SUICIDAL IDEATION: ICD-10-CM

## 2022-03-05 DIAGNOSIS — F10.929 ACUTE ALCOHOLIC INTOXICATION WITH COMPLICATION (HCC): Primary | ICD-10-CM

## 2022-03-05 LAB
ABSOLUTE EOS #: 0 K/UL (ref 0–0.4)
ABSOLUTE LYMPH #: 2 K/UL (ref 1–4.8)
ABSOLUTE MONO #: 0.4 K/UL (ref 0.1–1.3)
ALBUMIN SERPL-MCNC: 3.7 G/DL (ref 3.5–5.2)
ALP BLD-CCNC: 120 U/L (ref 35–104)
ALT SERPL-CCNC: 39 U/L (ref 5–33)
AMPHETAMINE SCREEN URINE: NEGATIVE
ANION GAP SERPL CALCULATED.3IONS-SCNC: 16 MMOL/L (ref 9–17)
AST SERPL-CCNC: 33 U/L
BACTERIA: ABNORMAL
BARBITURATE SCREEN URINE: NEGATIVE
BASOPHILS # BLD: 0 % (ref 0–2)
BASOPHILS ABSOLUTE: 0 K/UL (ref 0–0.2)
BENZODIAZEPINE SCREEN, URINE: NEGATIVE
BILIRUB SERPL-MCNC: 0.25 MG/DL (ref 0.3–1.2)
BILIRUBIN URINE: NEGATIVE
BUN BLDV-MCNC: 11 MG/DL (ref 6–20)
CALCIUM SERPL-MCNC: 7.8 MG/DL (ref 8.6–10.4)
CANNABINOID SCREEN URINE: NEGATIVE
CASTS UA: ABNORMAL /LPF
CHLORIDE BLD-SCNC: 105 MMOL/L (ref 98–107)
CO2: 20 MMOL/L (ref 20–31)
COCAINE METABOLITE, URINE: NEGATIVE
COLOR: YELLOW
CREAT SERPL-MCNC: 0.52 MG/DL (ref 0.5–0.9)
EOSINOPHILS RELATIVE PERCENT: 0 % (ref 0–4)
EPITHELIAL CELLS UA: ABNORMAL /HPF
ETHANOL PERCENT: 0.23 %
ETHANOL: 233 MG/DL
GFR AFRICAN AMERICAN: >60 ML/MIN
GFR NON-AFRICAN AMERICAN: >60 ML/MIN
GFR SERPL CREATININE-BSD FRML MDRD: ABNORMAL ML/MIN/{1.73_M2}
GLUCOSE BLD-MCNC: 216 MG/DL (ref 70–99)
GLUCOSE URINE: ABNORMAL
HCT VFR BLD CALC: 43.5 % (ref 36–46)
HEMOGLOBIN: 14.3 G/DL (ref 12–16)
KETONES, URINE: ABNORMAL
LEUKOCYTE ESTERASE, URINE: NEGATIVE
LYMPHOCYTES # BLD: 42 % (ref 24–44)
MAGNESIUM: 1.8 MG/DL (ref 1.6–2.6)
MCH RBC QN AUTO: 29.9 PG (ref 26–34)
MCHC RBC AUTO-ENTMCNC: 33 G/DL (ref 31–37)
MCV RBC AUTO: 90.7 FL (ref 80–100)
METHADONE SCREEN, URINE: NEGATIVE
MONOCYTES # BLD: 9 % (ref 1–7)
NITRITE, URINE: NEGATIVE
OPIATES, URINE: NEGATIVE
OXYCODONE SCREEN URINE: NEGATIVE
PDW BLD-RTO: 14.2 % (ref 11.5–14.9)
PH UA: 6 (ref 5–8)
PHENCYCLIDINE, URINE: NEGATIVE
PLATELET # BLD: 358 K/UL (ref 150–450)
PMV BLD AUTO: 8 FL (ref 6–12)
POTASSIUM SERPL-SCNC: 3.5 MMOL/L (ref 3.7–5.3)
PROTEIN UA: ABNORMAL
RBC # BLD: 4.8 M/UL (ref 4–5.2)
RBC UA: ABNORMAL /HPF
SARS-COV-2, RAPID: NOT DETECTED
SEG NEUTROPHILS: 49 % (ref 36–66)
SEGMENTED NEUTROPHILS ABSOLUTE COUNT: 2.2 K/UL (ref 1.3–9.1)
SODIUM BLD-SCNC: 141 MMOL/L (ref 135–144)
SPECIFIC GRAVITY UA: 1.03 (ref 1–1.03)
SPECIMEN DESCRIPTION: NORMAL
TEST INFORMATION: NORMAL
TOTAL PROTEIN: 6.1 G/DL (ref 6.4–8.3)
TURBIDITY: CLEAR
URINE HGB: NEGATIVE
UROBILINOGEN, URINE: NORMAL
WBC # BLD: 4.6 K/UL (ref 3.5–11)
WBC UA: ABNORMAL /HPF

## 2022-03-05 PROCEDURE — 80053 COMPREHEN METABOLIC PANEL: CPT

## 2022-03-05 PROCEDURE — 99283 EMERGENCY DEPT VISIT LOW MDM: CPT

## 2022-03-05 PROCEDURE — 97116 GAIT TRAINING THERAPY: CPT

## 2022-03-05 PROCEDURE — 93005 ELECTROCARDIOGRAM TRACING: CPT

## 2022-03-05 PROCEDURE — 6370000000 HC RX 637 (ALT 250 FOR IP): Performed by: EMERGENCY MEDICINE

## 2022-03-05 PROCEDURE — 81001 URINALYSIS AUTO W/SCOPE: CPT

## 2022-03-05 PROCEDURE — G0480 DRUG TEST DEF 1-7 CLASSES: HCPCS

## 2022-03-05 PROCEDURE — 85025 COMPLETE CBC W/AUTO DIFF WBC: CPT

## 2022-03-05 PROCEDURE — 83735 ASSAY OF MAGNESIUM: CPT

## 2022-03-05 PROCEDURE — 80307 DRUG TEST PRSMV CHEM ANLYZR: CPT

## 2022-03-05 PROCEDURE — 36415 COLL VENOUS BLD VENIPUNCTURE: CPT

## 2022-03-05 PROCEDURE — 87635 SARS-COV-2 COVID-19 AMP PRB: CPT

## 2022-03-05 PROCEDURE — 6370000000 HC RX 637 (ALT 250 FOR IP): Performed by: STUDENT IN AN ORGANIZED HEALTH CARE EDUCATION/TRAINING PROGRAM

## 2022-03-05 RX ORDER — LORAZEPAM 1 MG/1
1 TABLET ORAL ONCE
Status: COMPLETED | OUTPATIENT
Start: 2022-03-05 | End: 2022-03-05

## 2022-03-05 RX ORDER — LORAZEPAM 1 MG/1
2 TABLET ORAL ONCE
Status: COMPLETED | OUTPATIENT
Start: 2022-03-05 | End: 2022-03-05

## 2022-03-05 RX ADMIN — LORAZEPAM 2 MG: 1 TABLET ORAL at 22:49

## 2022-03-05 RX ADMIN — LORAZEPAM 1 MG: 1 TABLET ORAL at 16:14

## 2022-03-05 ASSESSMENT — ENCOUNTER SYMPTOMS
RHINORRHEA: 0
SHORTNESS OF BREATH: 0
PHOTOPHOBIA: 0
COLOR CHANGE: 0
FACIAL SWELLING: 0
VOMITING: 0
DIARRHEA: 0
EYE ITCHING: 0
NAUSEA: 0
COUGH: 0
ABDOMINAL PAIN: 0

## 2022-03-05 NOTE — ED NOTES
Patient understands her alcohol level is above legal limit but still wants to come in for admission to stabilize when informed she will be reevaluated at 2245 hours.

## 2022-03-05 NOTE — ED NOTES
Provisional Diagnosis:   bipolar with mixed recent depressive episode with SI, SVT, Diabetic, anemia, alcohol dependence severe / chronic     Psychosocial and Contextual Factors:  poor sleep, off medications, alcohol abuse       C-SSRS Summary:    Last admission 2016 I      Patient:   Family: lives with  and mom  gill [phone]  Agency: [facility]- reports CNP abruptly quit and she has no provider for her medications and ran out and has upcoming appt with new CNP Monday she thinks ( then reports she needs her Ativan / Mina Richards refilled and the new practitioner wont given them to her until he sees her)     Substance Abuse:   Binge alcoholic drinks 3-4 days when she binges reports last day of drinking was yesterday drinks 1-2 gallons daily of fireball/vodka      Present Suicidal Behavior:    Ideations with drinking self to death or OD     Verbal: X     Attempt: X     Past Suicidal Behavior:  has numerous attempts to overdose on medications and benzos (5x recorded in chart with serious side affects) also drank isopropyl alcohol in an attempt to kill self   Verbal: X     Attempt: X        Self-Injurious/Self-Mutilation:    denies      Hx of Violence:  denies    Trauma Hx:     denies    Protective Factors:  stable housing with , insurance, SSDI, linked with [facility], transportation, support system     Risk Factors:  alcohol dependence- severe chronic, poor insight, poor coping/problem solving skills, not linked with therapy or case management, does not utilize support system, hx of medication dependance     Clinical Summary:   Chinmay Mahmood is a  42year old  female whom presented to the ED via her  and mother for suicidal ideations and depressed mood, she reports she feels hopeless, helpless, feels worthless is reporting no sleep for days and poor appetite due to she has been off her medications for over 3 days due to her CNP at [facility] quit abruptly and a new one cannot see her until next week and she reports they will not refill her meds until the new nurse see's her. Patient denies any HI AH  reports she is a binge drinker and her medications help with her anxiety and depression with her withdrawals and she last drank yesterday and she drinks \"a Gallon to 2 gallons daily when I am on a binge\". Patient reports she only has medsomatic services is not in any AOD treatment, nor therapy. Patient reports no legal issues, reports health issues with SVT, Anemic and being Diabetic. Patient presents Ox4 with anxious and depressed mood, rapid pressured speech at times, crying, worrying about her mental health status, poor ADLs with body odor, dirty hair with minimal eye contact, withdrawn and cooperative. Patient at times is evasive about her past hx of drinking and self harm and has minimized the amount and number of times she has attempted self harm. Patient has been linked in the past with [facility] and Dr Lottie Navas in private practice and not compliant and has poor memory with dates. Patient at times has poor recall, concentration, decision making and presents at times with trouble processing. Clinician spoke with  and mother with patient permission and he reports patient has been drinking up until 6-7 am this morning and had about 6 shots then. Both report she attempted to take car keys and told them she was going to drive off the bridge and kill herself. Both confirmed new provider at [facility] would not reknew some of her medications due to they are classified medications until he/she saw client in person. Level of Care Disposition: To be medically cleared and BAL to see level and psych consult to be initiated.  If legally sober

## 2022-03-05 NOTE — ED PROVIDER NOTES
EMERGENCY DEPARTMENT ENCOUNTER    Pt Name: Rodolph Lundborg  MRN: [id number]  [place] 1980  Date of evaluation: 3/5/22  CHIEF COMPLAINT       Chief Complaint   Patient presents with    Mental Health Problem     HISTORY OF PRESENT ILLNESS   HPI  42year old female history of bipolar, suicide attempts in the past presents for evaluation of suicidal ideation. Patient reports she has been off of her medicines over the past 3-4 days and has been drinking alcohol heavily. She has not slept in days. She has multiple prior suicide attempts in the past. Feeling very anxious and palpitations today but no other physical symptoms. Symptoms are moderate and persistent. REVIEW OF SYSTEMS     Review of Systems   Constitutional: Negative for chills and fatigue. HENT: Negative for facial swelling, postnasal drip and rhinorrhea. Eyes: Negative for photophobia and itching. Respiratory: Negative for cough and shortness of breath. Cardiovascular: Negative for chest pain and leg swelling. Gastrointestinal: Negative for abdominal pain, diarrhea, nausea and vomiting. Genitourinary: Negative for dysuria, flank pain and hematuria. Musculoskeletal: Negative for arthralgias and joint swelling. Skin: Negative for color change and rash. Neurological: Negative for dizziness, numbness and headaches. Psychiatric/Behavioral: Positive for dysphoric mood, sleep disturbance and suicidal ideas. The patient is nervous/anxious.       PASTMEDICAL HISTORY     Past Medical History:   Diagnosis Date    Alcohol abuse     sober since5    Anxiety     Arthritis     Painting esophagus     Benzodiazepine overdose 9/26/2015    Bipolar disorder, unspecified ([address]     Bipolar I disorder, most recent episode depressed, severe without psychotic features ([address]     Cellulitis 11/17/2018    Chronic abdominal wound infection 9/27/2015    Constipation 9/3/2019    Depression 7/12/2015    Drug overdose, intentional ([address] 7/12/2015  Genital herpes, unspecified     GERD (gastroesophageal reflux disease)     History of pulmonary embolism     Hx of blood clots dx 2 years ago    clots in both legs and lungs     Hypertension     Iron deficiency anemia     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    Lumbosacral spondylosis without myelopathy     MDRO (multiple drug resistant organisms) resistance 2010    MRSA (abd)    Miscarriage     multiple, around 7th mos pregnant each time d/t hypercoagulation    Morbid obesity [facility]     MRSA (methicillin resistant staph aureus) culture positive 02/10/2017    urine    Overdose of benzodiazepine     Pernicious anemia     Primary hypercoagulable state ([facility]     antiphospholipid antibodies on Arixtra shots daily    Pulmonary embolism ([facility] 2010    Suicidal behavior 12/14/2015    Suicidal ideation     Suicide attempt ([facility] 2014    hx OD on painkillers and rubbing alcohol    SVT (supraventricular tachycardia) ([facility] 04/07/2017    Toxic effect of ethanol, intentional self-harm ([facility] 12/16/2015    Type 2 diabetes mellitus, with long-term current use of insulin ([facility])      Past Problem List  Patient Active Problem List   Diagnosis Code    Pernicious anemia D51.0    History of ulcer disease Z87.898    History of DVT of lower extremity Z86.718    Primary hypercoagulable state ([facility] D68.59    Amputation finger S68.119A    GERD (gastroesophageal reflux disease) K21.9    Alcohol dependence in remission ([facility] F10.21    Type 2 diabetes mellitus with diabetic polyneuropathy, with long-term current use of insulin ([facility]) E11.42, Z79.4    Primary insomnia F51.01    Essential hypertension I10    Bipolar affective disorder in remission ([facility] F31.70    History of pulmonary embolism Z86.711    Antiphospholipid syndrome ([facility]) D68.61    Hypertriglyceridemia E78.1    Chronic post-traumatic stress disorder (PTSD) F43.12    OCD (obsessive compulsive disorder) F42.9    Severe benzodiazepine use disorder ([facility] F13.20    Morbid obesity with BMI of 50.0-59.9, adult (HCC) E66.01, Z68.43    History of MRSA infection Z86.14    Pain of upper abdomen R10.10    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    NAFLD (nonalcoholic fatty liver disease) K76.0    Nondependent opioid abuse in remission ([facility] F11.11    Osteopenia M85.80    History of Awa-en-Y gastric bypass Z98.84    Herpes genitalis A60.00    History of drug abuse ([facility] F19.11    Malabsorption K90.9    History of pulmonary embolus (PE) Z86.711    Vitamin B 12 deficiency E53.8    Vitamin D deficiency E55.9    History of surgery of liver Z98.890    Blood alkaline phosphatase increased compared with prior measurement R74.8    Paroxysmal SVT (supraventricular tachycardia) (HCC) I47.1    Anxiety F41.9    Hypomagnesemia E83.42    Chronic idiopathic constipation K59.04    Recurrent incisional hernia K43.2    History of small bowel obstruction Z87.19    History of peptic ulcer Z87.11    Fibromyalgia M79.7    Polyneuropathy G62.9    COVID-19 virus infection U07.1     SURGICAL HISTORY       Past Surgical History:   Procedure Laterality Date    ABDOMINAL HERNIA REPAIR  2014    wound vac    ABDOMINAL HERNIA REPAIR  11/3/14    BARIATRIC SURGERY  2013    [facility]    CHOLECYSTECTOMY      DILATION AND CURETTAGE OF UTERUS  2005    ENDOSCOPY, COLON, DIAGNOSTIC      EGD    FINGER AMPUTATION Left 02/09/2015    index and ring finger. from [address] [place] [address] [id number]      total of 8    IVC FILTER INSERTION      LIVER RESECTION      for hepatic adenoma    LYMPH NODE BIOPSY  1990    JUSTINE AND BSO  2011    TONSILLECTOMY       CURRENT MEDICATIONS       Previous Medications    ACETAMINOPHEN (TYLENOL) 500 MG TABLET    Take 2 tablets by mouth every 8 hours as needed for Pain    ACYCLOVIR (ZOVIRAX) 5 % OINTMENT    Apply topically every 3 hours x 10 days. AMPHETAMINE-DEXTROAMPHETAMINE (ADDERALL, 30MG,) 30 MG TABLET    Take 30 mg by mouth daily.     BENAZEPRIL 50 MG CAPSULE    Take 1 capsule by mouth 3 times daily for 7 days. QUETIAPINE (SEROQUEL) 100 MG TABLET    Take 50 mg by mouth nightly as needed     SUCRALFATE (CARAFATE) 1 GM TABLET    DISSOLVE 1 TABLET INTO 15 ML( 1 TABLESPOON) OF WATER AND DRINK BY MOUTH FOUR TIMES DAILY AS NEEDED FOR GERD    SYRINGE/NEEDLE, DISP, (SYRINGE 3CC/25GX1\") 25G X 1\" 3 ML MISC    To be used with B12 injections monthly    TIZANIDINE (ZANAFLEX) 4 MG TABLET    TAKE 1 TABLET BY MOUTH TWICE DAILY AS NEEDED FOR BACK PAIN    TOUJEO SOLOSTAR 300 UNIT/ML SOPN    INJECT 70 UNITS INTO THE SKIN EVERY MORNING    VALACYCLOVIR (VALTREX) 500 MG TABLET    Take 1 tablet by mouth daily    VITAMIN D (CHOLECALCIFEROL) 1000 UNIT TABS TABLET    Take 10,000 Units by mouth daily 5 days a week     ALLERGIES     is allergic to asa [aspirin], betadine [povidone iodine], celexa [citalopram hydrobromide], citalopram, lasix [furosemide], macrobid [nitrofurantoin], norco [hydrocodone-acetaminophen], betadine [povidone iodine], codeine, lithium, paroxetine, paxil [paroxetine hcl], tape [adhesive tape], and tegretol [carbamazepine]. FAMILY HISTORY     She indicated that her mother is alive. She indicated that her father is alive. She indicated that the status of her maternal aunt is unknown. She indicated that the status of her maternal uncle is unknown. She indicated that the status of her maternal cousin is unknown. SOCIAL HISTORY       Social History     Tobacco Use    Smoking status: Never Smoker    Smokeless tobacco: Never Used   Substance Use Topics    Alcohol use: No     Alcohol/week: 0.0 standard drinks     Comment: Last alcohol use was in 12/2015    Drug use: No     Comment: Pt stole her mom's Benzo 1 yr ago. Pt said she took more than prescribed dose of Valium once in late 90's. PHYSICAL EXAM     INITIAL VITALS: BP (!) 170/95   Pulse 116   Temp 98.6 °F (37 °C)   Resp 18   SpO2 99%    Physical Exam  Vitals and nursing note reviewed. Constitutional:       Appearance: She is normal weight. HENT:      Head: Normocephalic and atraumatic. Eyes:      Extraocular Movements: Extraocular movements intact. Pupils: Pupils are equal, round, and reactive to light. Cardiovascular:      Rate and Rhythm: Regular rhythm. Tachycardia present. Pulmonary:      Effort: Pulmonary effort is normal.      Breath sounds: Normal breath sounds. Abdominal:      General: Abdomen is flat. There is no distension. Palpations: There is no mass. Musculoskeletal:         General: No swelling. Normal range of motion. Cervical back: Normal range of motion and neck supple. Skin:     General: Skin is warm and dry. Neurological:      General: No focal deficit present. Mental Status: She is alert. Mental status is at baseline. Psychiatric:      Comments: Suicidal, tearful          MEDICAL DECISION MAKINyear old female history of bipolar presents for evaluation of suicidal ideation. Will check basic labs, EKG, plan for psychiatric admission. Alcohol level is elevated 233. Patient will need to be reevaluated when sober. Patient will be sober at 10:30 PM.  Patient will be signed out pending reassessment. CRITICAL CARE:       PROCEDURES:    Procedures    DIAGNOSTIC RESULTS   EKG:All EKG's are interpreted by the Emergency Department Physician who either signs or Co-signs this chart in the absence of a cardiologist.    Sinus tachycardia rate of 105 normal axis normal is no concerning ST-T wave changes    RADIOLOGY:All plain film, CT, MRI, and formal ultrasound images (except ED bedside ultrasound) are read by the radiologist, see reports below, unless otherwisenoted in MDM or here. No orders to display     LABS: All lab results were reviewed by myself, and all abnormals are listed below.   Labs Reviewed   CBC WITH AUTO DIFFERENTIAL - Abnormal; Notable for the following components:       Result Value    Monocytes 9 (*)     All other components within normal limits   COMPREHENSIVE METABOLIC PANEL W/ REFLEX TO MG FOR LOW K - Abnormal; Notable for the following components:    Glucose 216 (*)     Calcium 7.8 (*)     Potassium 3.5 (*)     Alkaline Phosphatase 120 (*)     ALT 39 (*)     AST 33 (*)     Total Bilirubin 0.25 (*)     Total Protein 6.1 (*)     All other components within normal limits   ETHANOL - Abnormal; Notable for the following components:    Ethanol 233 (*)     All other components within normal limits   COVID-19, RAPID   MAGNESIUM   URINALYSIS WITH MICROSCOPIC   URINE DRUG SCREEN       EMERGENCY DEPARTMENTCOURSE:         Vitals:    Vitals:    03/05/22 1513   BP: (!) 170/95   Pulse: 116   Resp: 18   Temp: 98.6 °F (37 °C)   SpO2: 99%       The patient was given the following medications while in the emergency department:  Orders Placed This Encounter   Medications    LORazepam (ATIVAN) tablet 1 mg     CONSULTS:  None    FINAL IMPRESSION      1. Acute alcoholic intoxication with complication ([address]    2. Suicidal ideation          DISPOSITION/PLAN   DISPOSITION        PATIENT REFERRED TO:  No follow-up provider specified. DISCHARGE MEDICATIONS:  New Prescriptions    No medications on file     The care is provided during an unprecedented national emergency due to the novel coronavirus, COVID 19.   MD Harika Babcock MD  03/05/22 9421

## 2022-03-10 LAB
EKG ATRIAL RATE: 105 BPM
EKG P AXIS: 63 DEGREES
EKG P-R INTERVAL: 160 MS
EKG Q-T INTERVAL: 374 MS
EKG QRS DURATION: 96 MS
EKG QTC CALCULATION (BAZETT): 494 MS
EKG R AXIS: 58 DEGREES
EKG T AXIS: 42 DEGREES
EKG VENTRICULAR RATE: 105 BPM

## 2022-03-12 ENCOUNTER — APPOINTMENT (OUTPATIENT)
Dept: CT IMAGING | Age: 42
DRG: 871 | End: 2022-03-12
Payer: MEDICARE

## 2022-03-12 ENCOUNTER — APPOINTMENT (OUTPATIENT)
Dept: GENERAL RADIOLOGY | Age: 42
DRG: 871 | End: 2022-03-12
Payer: MEDICARE

## 2022-03-12 ENCOUNTER — HOSPITAL ENCOUNTER (INPATIENT)
Age: 42
LOS: 7 days | Discharge: HOME HEALTH | DRG: 871 | End: 2022-03-19
Attending: EMERGENCY MEDICINE | Admitting: INTERNAL MEDICINE
Payer: MEDICARE

## 2022-03-12 DIAGNOSIS — E11.10 DIABETIC KETOACIDOSIS WITHOUT COMA ASSOCIATED WITH TYPE 2 DIABETES MELLITUS (HCC): Primary | ICD-10-CM

## 2022-03-12 DIAGNOSIS — I26.99 ACUTE PULMONARY EMBOLISM, UNSPECIFIED PULMONARY EMBOLISM TYPE, UNSPECIFIED WHETHER ACUTE COR PULMONALE PRESENT (HCC): ICD-10-CM

## 2022-03-12 LAB
ABSOLUTE BANDS #: 0.81 K/UL (ref 0–1)
ABSOLUTE EOS #: 0 K/UL (ref 0–0.4)
ABSOLUTE LYMPH #: 0.41 K/UL (ref 1–4.8)
ABSOLUTE MONO #: 0.32 K/UL (ref 0.1–1.3)
ALBUMIN SERPL-MCNC: 2.8 G/DL (ref 3.5–5.2)
ALP BLD-CCNC: 411 U/L (ref 35–104)
ALT SERPL-CCNC: 504 U/L (ref 5–33)
AMORPHOUS: ABNORMAL
ANION GAP SERPL CALCULATED.3IONS-SCNC: 15 MMOL/L (ref 9–17)
ANION GAP SERPL CALCULATED.3IONS-SCNC: 19 MMOL/L (ref 9–17)
ANION GAP SERPL CALCULATED.3IONS-SCNC: 20 MMOL/L (ref 9–17)
ANION GAP SERPL CALCULATED.3IONS-SCNC: 24 MMOL/L (ref 9–17)
AST SERPL-CCNC: 2478 U/L
BACTERIA: ABNORMAL
BANDS: 10 % (ref 0–10)
BASOPHILS # BLD: 0 % (ref 0–2)
BASOPHILS ABSOLUTE: 0 K/UL (ref 0–0.2)
BETA-HYDROXYBUTYRATE: 0.57 MMOL/L (ref 0.02–0.27)
BILIRUB SERPL-MCNC: 1.75 MG/DL (ref 0.3–1.2)
BILIRUBIN URINE: NEGATIVE
BUN BLDV-MCNC: 31 MG/DL (ref 6–20)
BUN BLDV-MCNC: 35 MG/DL (ref 6–20)
BUN BLDV-MCNC: 35 MG/DL (ref 6–20)
BUN BLDV-MCNC: 36 MG/DL (ref 6–20)
CALCIUM SERPL-MCNC: 7 MG/DL (ref 8.6–10.4)
CALCIUM SERPL-MCNC: 7.1 MG/DL (ref 8.6–10.4)
CALCIUM SERPL-MCNC: 7.2 MG/DL (ref 8.6–10.4)
CALCIUM SERPL-MCNC: 7.4 MG/DL (ref 8.6–10.4)
CARBOXYHEMOGLOBIN: 0.2 % (ref 0–5)
CASTS UA: ABNORMAL /LPF
CHLORIDE BLD-SCNC: 74 MMOL/L (ref 98–107)
CHLORIDE BLD-SCNC: 93 MMOL/L (ref 98–107)
CHLORIDE BLD-SCNC: 94 MMOL/L (ref 98–107)
CHLORIDE BLD-SCNC: 98 MMOL/L (ref 98–107)
CO2: 13 MMOL/L (ref 20–31)
CO2: 16 MMOL/L (ref 20–31)
COLOR: YELLOW
CREAT SERPL-MCNC: 1.19 MG/DL (ref 0.5–0.9)
CREAT SERPL-MCNC: 1.22 MG/DL (ref 0.5–0.9)
CREAT SERPL-MCNC: 1.23 MG/DL (ref 0.5–0.9)
CREAT SERPL-MCNC: 1.5 MG/DL (ref 0.5–0.9)
EOSINOPHILS RELATIVE PERCENT: 0 % (ref 0–4)
EPITHELIAL CELLS UA: ABNORMAL /HPF
ETHANOL PERCENT: 0.4 %
ETHANOL: 404 MG/DL
GFR AFRICAN AMERICAN: 46 ML/MIN
GFR AFRICAN AMERICAN: 58 ML/MIN
GFR AFRICAN AMERICAN: 59 ML/MIN
GFR AFRICAN AMERICAN: >60 ML/MIN
GFR NON-AFRICAN AMERICAN: 38 ML/MIN
GFR NON-AFRICAN AMERICAN: 48 ML/MIN
GFR NON-AFRICAN AMERICAN: 48 ML/MIN
GFR NON-AFRICAN AMERICAN: 50 ML/MIN
GFR SERPL CREATININE-BSD FRML MDRD: ABNORMAL ML/MIN/{1.73_M2}
GLUCOSE BLD-MCNC: 1255 MG/DL (ref 70–99)
GLUCOSE BLD-MCNC: 514 MG/DL (ref 65–105)
GLUCOSE BLD-MCNC: 543 MG/DL (ref 70–99)
GLUCOSE BLD-MCNC: 562 MG/DL (ref 65–105)
GLUCOSE BLD-MCNC: 582 MG/DL (ref 70–99)
GLUCOSE BLD-MCNC: 623 MG/DL (ref 70–99)
GLUCOSE BLD-MCNC: 682 MG/DL (ref 70–99)
GLUCOSE BLD-MCNC: 695 MG/DL (ref 70–99)
GLUCOSE BLD-MCNC: >600 MG/DL (ref 65–105)
GLUCOSE URINE: ABNORMAL
HCO3 VENOUS: 14.4 MMOL/L (ref 24–30)
HCT VFR BLD CALC: 50.7 % (ref 36–46)
HEMOGLOBIN: 15.5 G/DL (ref 12–16)
INR BLD: 1.2
KETONES, URINE: NEGATIVE
LACTIC ACID: 10.2 MMOL/L (ref 0.5–2.2)
LACTIC ACID: 11.1 MMOL/L (ref 0.5–2.2)
LEUKOCYTE ESTERASE, URINE: NEGATIVE
LIPASE: 257 U/L (ref 13–60)
LYMPHOCYTES # BLD: 5 % (ref 24–44)
MAGNESIUM: 2.4 MG/DL (ref 1.6–2.6)
MAGNESIUM: 2.5 MG/DL (ref 1.6–2.6)
MAGNESIUM: 2.7 MG/DL (ref 1.6–2.6)
MCH RBC QN AUTO: 30.9 PG (ref 26–34)
MCHC RBC AUTO-ENTMCNC: 30.5 G/DL (ref 31–37)
MCV RBC AUTO: 101.2 FL (ref 80–100)
METHEMOGLOBIN: 0.7 % (ref 0–1.9)
MONOCYTES # BLD: 4 % (ref 1–7)
MORPHOLOGY: ABNORMAL
NEGATIVE BASE EXCESS, VEN: 13.9 MMOL/L (ref 0–2)
NITRITE, URINE: NEGATIVE
O2 SAT, VEN: 53.5 % (ref 60–85)
PARTIAL THROMBOPLASTIN TIME: 35.1 SEC (ref 24–36)
PCO2, VEN: 37.9 (ref 39–55)
PDW BLD-RTO: 14.9 % (ref 11.5–14.9)
PH UA: 5 (ref 5–8)
PH VENOUS: 7.19 (ref 7.32–7.42)
PHOSPHORUS: 1.1 MG/DL (ref 2.6–4.5)
PHOSPHORUS: 2 MG/DL (ref 2.6–4.5)
PHOSPHORUS: 2.1 MG/DL (ref 2.6–4.5)
PLATELET # BLD: 278 K/UL (ref 150–450)
PMV BLD AUTO: 9.4 FL (ref 6–12)
PO2, VEN: 33.3 (ref 30–50)
POTASSIUM SERPL-SCNC: 4.3 MMOL/L (ref 3.7–5.3)
POTASSIUM SERPL-SCNC: 4.5 MMOL/L (ref 3.7–5.3)
POTASSIUM SERPL-SCNC: 4.8 MMOL/L (ref 3.7–5.3)
POTASSIUM SERPL-SCNC: 5.4 MMOL/L (ref 3.7–5.3)
PROCALCITONIN: 0.11 NG/ML
PROTEIN UA: ABNORMAL
PROTHROMBIN TIME: 15.6 SEC (ref 11.8–14.6)
RBC # BLD: 5 M/UL (ref 4–5.2)
RBC UA: ABNORMAL /HPF
SEG NEUTROPHILS: 81 % (ref 36–66)
SEGMENTED NEUTROPHILS ABSOLUTE COUNT: 6.56 K/UL (ref 1.3–9.1)
SODIUM BLD-SCNC: 111 MMOL/L (ref 135–144)
SODIUM BLD-SCNC: 125 MMOL/L (ref 135–144)
SODIUM BLD-SCNC: 127 MMOL/L (ref 135–144)
SODIUM BLD-SCNC: 129 MMOL/L (ref 135–144)
SPECIFIC GRAVITY UA: 1.03 (ref 1–1.03)
TEXT FOR RESPIRATORY: ABNORMAL
TOTAL PROTEIN: 5.5 G/DL (ref 6.4–8.3)
TROPONIN, HIGH SENSITIVITY: 42 NG/L (ref 0–14)
TURBIDITY: CLEAR
URINE HGB: ABNORMAL
UROBILINOGEN, URINE: NORMAL
WBC # BLD: 8.1 K/UL (ref 3.5–11)
WBC UA: ABNORMAL /HPF
YEAST: ABNORMAL

## 2022-03-12 PROCEDURE — 6360000002 HC RX W HCPCS: Performed by: STUDENT IN AN ORGANIZED HEALTH CARE EDUCATION/TRAINING PROGRAM

## 2022-03-12 PROCEDURE — 2580000003 HC RX 258: Performed by: EMERGENCY MEDICINE

## 2022-03-12 PROCEDURE — 84100 ASSAY OF PHOSPHORUS: CPT

## 2022-03-12 PROCEDURE — 70450 CT HEAD/BRAIN W/O DYE: CPT

## 2022-03-12 PROCEDURE — 99223 1ST HOSP IP/OBS HIGH 75: CPT | Performed by: INTERNAL MEDICINE

## 2022-03-12 PROCEDURE — 80048 BASIC METABOLIC PNL TOTAL CA: CPT

## 2022-03-12 PROCEDURE — 87040 BLOOD CULTURE FOR BACTERIA: CPT

## 2022-03-12 PROCEDURE — 6360000002 HC RX W HCPCS: Performed by: EMERGENCY MEDICINE

## 2022-03-12 PROCEDURE — 71045 X-RAY EXAM CHEST 1 VIEW: CPT

## 2022-03-12 PROCEDURE — 2500000003 HC RX 250 WO HCPCS: Performed by: INTERNAL MEDICINE

## 2022-03-12 PROCEDURE — 82805 BLOOD GASES W/O2 SATURATION: CPT

## 2022-03-12 PROCEDURE — 83690 ASSAY OF LIPASE: CPT

## 2022-03-12 PROCEDURE — 36556 INSERT NON-TUNNEL CV CATH: CPT

## 2022-03-12 PROCEDURE — 93005 ELECTROCARDIOGRAM TRACING: CPT | Performed by: EMERGENCY MEDICINE

## 2022-03-12 PROCEDURE — 83735 ASSAY OF MAGNESIUM: CPT

## 2022-03-12 PROCEDURE — 81001 URINALYSIS AUTO W/SCOPE: CPT

## 2022-03-12 PROCEDURE — 85730 THROMBOPLASTIN TIME PARTIAL: CPT

## 2022-03-12 PROCEDURE — 6360000002 HC RX W HCPCS: Performed by: INTERNAL MEDICINE

## 2022-03-12 PROCEDURE — 82800 BLOOD PH: CPT

## 2022-03-12 PROCEDURE — 83605 ASSAY OF LACTIC ACID: CPT

## 2022-03-12 PROCEDURE — 84484 ASSAY OF TROPONIN QUANT: CPT

## 2022-03-12 PROCEDURE — G0480 DRUG TEST DEF 1-7 CLASSES: HCPCS

## 2022-03-12 PROCEDURE — 85025 COMPLETE CBC W/AUTO DIFF WBC: CPT

## 2022-03-12 PROCEDURE — 2500000003 HC RX 250 WO HCPCS: Performed by: STUDENT IN AN ORGANIZED HEALTH CARE EDUCATION/TRAINING PROGRAM

## 2022-03-12 PROCEDURE — 6370000000 HC RX 637 (ALT 250 FOR IP): Performed by: EMERGENCY MEDICINE

## 2022-03-12 PROCEDURE — 82010 KETONE BODYS QUAN: CPT

## 2022-03-12 PROCEDURE — 2580000003 HC RX 258: Performed by: STUDENT IN AN ORGANIZED HEALTH CARE EDUCATION/TRAINING PROGRAM

## 2022-03-12 PROCEDURE — 82947 ASSAY GLUCOSE BLOOD QUANT: CPT

## 2022-03-12 PROCEDURE — 84145 PROCALCITONIN (PCT): CPT

## 2022-03-12 PROCEDURE — 2000000000 HC ICU R&B

## 2022-03-12 PROCEDURE — 85610 PROTHROMBIN TIME: CPT

## 2022-03-12 PROCEDURE — 96365 THER/PROPH/DIAG IV INF INIT: CPT

## 2022-03-12 PROCEDURE — 80053 COMPREHEN METABOLIC PANEL: CPT

## 2022-03-12 PROCEDURE — 99285 EMERGENCY DEPT VISIT HI MDM: CPT

## 2022-03-12 PROCEDURE — 36415 COLL VENOUS BLD VENIPUNCTURE: CPT

## 2022-03-12 PROCEDURE — 96361 HYDRATE IV INFUSION ADD-ON: CPT

## 2022-03-12 PROCEDURE — 96366 THER/PROPH/DIAG IV INF ADDON: CPT

## 2022-03-12 RX ORDER — SODIUM CHLORIDE AND POTASSIUM CHLORIDE .9; .15 G/100ML; G/100ML
SOLUTION INTRAVENOUS CONTINUOUS
Status: DISCONTINUED | OUTPATIENT
Start: 2022-03-12 | End: 2022-03-15

## 2022-03-12 RX ORDER — DEXTROSE MONOHYDRATE 25 G/50ML
12.5 INJECTION, SOLUTION INTRAVENOUS PRN
Status: DISCONTINUED | OUTPATIENT
Start: 2022-03-12 | End: 2022-03-12 | Stop reason: SDUPTHER

## 2022-03-12 RX ORDER — SODIUM CHLORIDE 9 MG/ML
INJECTION, SOLUTION INTRAVENOUS CONTINUOUS
Status: DISCONTINUED | OUTPATIENT
Start: 2022-03-12 | End: 2022-03-15

## 2022-03-12 RX ORDER — 0.9 % SODIUM CHLORIDE 0.9 %
1000 INTRAVENOUS SOLUTION INTRAVENOUS ONCE
Status: COMPLETED | OUTPATIENT
Start: 2022-03-12 | End: 2022-03-12

## 2022-03-12 RX ORDER — LORAZEPAM 1 MG/1
3 TABLET ORAL
Status: DISCONTINUED | OUTPATIENT
Start: 2022-03-12 | End: 2022-03-16

## 2022-03-12 RX ORDER — LORAZEPAM 2 MG/ML
2 INJECTION INTRAMUSCULAR
Status: DISCONTINUED | OUTPATIENT
Start: 2022-03-12 | End: 2022-03-16

## 2022-03-12 RX ORDER — SODIUM CHLORIDE 9 MG/ML
25 INJECTION, SOLUTION INTRAVENOUS PRN
Status: DISCONTINUED | OUTPATIENT
Start: 2022-03-12 | End: 2022-03-19 | Stop reason: HOSPADM

## 2022-03-12 RX ORDER — DEXTROSE MONOHYDRATE 50 MG/ML
100 INJECTION, SOLUTION INTRAVENOUS PRN
Status: DISCONTINUED | OUTPATIENT
Start: 2022-03-12 | End: 2022-03-19 | Stop reason: HOSPADM

## 2022-03-12 RX ORDER — LORAZEPAM 1 MG/1
4 TABLET ORAL
Status: DISCONTINUED | OUTPATIENT
Start: 2022-03-12 | End: 2022-03-16

## 2022-03-12 RX ORDER — DEXTROSE MONOHYDRATE 25 G/50ML
12.5 INJECTION, SOLUTION INTRAVENOUS PRN
Status: DISCONTINUED | OUTPATIENT
Start: 2022-03-12 | End: 2022-03-19 | Stop reason: HOSPADM

## 2022-03-12 RX ORDER — ONDANSETRON 2 MG/ML
4 INJECTION INTRAMUSCULAR; INTRAVENOUS EVERY 6 HOURS PRN
Status: DISCONTINUED | OUTPATIENT
Start: 2022-03-12 | End: 2022-03-19 | Stop reason: HOSPADM

## 2022-03-12 RX ORDER — NICOTINE POLACRILEX 4 MG
15 LOZENGE BUCCAL PRN
Status: DISCONTINUED | OUTPATIENT
Start: 2022-03-12 | End: 2022-03-19 | Stop reason: HOSPADM

## 2022-03-12 RX ORDER — ACETAMINOPHEN 650 MG/1
650 SUPPOSITORY RECTAL EVERY 6 HOURS PRN
Status: DISCONTINUED | OUTPATIENT
Start: 2022-03-12 | End: 2022-03-19 | Stop reason: HOSPADM

## 2022-03-12 RX ORDER — DEXTROSE AND SODIUM CHLORIDE 5; .45 G/100ML; G/100ML
INJECTION, SOLUTION INTRAVENOUS CONTINUOUS PRN
Status: DISCONTINUED | OUTPATIENT
Start: 2022-03-12 | End: 2022-03-15

## 2022-03-12 RX ORDER — LORAZEPAM 2 MG/ML
1 INJECTION INTRAMUSCULAR
Status: DISCONTINUED | OUTPATIENT
Start: 2022-03-12 | End: 2022-03-16

## 2022-03-12 RX ORDER — LORAZEPAM 1 MG/1
2 TABLET ORAL
Status: DISCONTINUED | OUTPATIENT
Start: 2022-03-12 | End: 2022-03-16

## 2022-03-12 RX ORDER — SODIUM CHLORIDE 0.9 % (FLUSH) 0.9 %
5-40 SYRINGE (ML) INJECTION EVERY 12 HOURS SCHEDULED
Status: DISCONTINUED | OUTPATIENT
Start: 2022-03-12 | End: 2022-03-19 | Stop reason: HOSPADM

## 2022-03-12 RX ORDER — LORAZEPAM 1 MG/1
1 TABLET ORAL
Status: DISCONTINUED | OUTPATIENT
Start: 2022-03-12 | End: 2022-03-16

## 2022-03-12 RX ORDER — LORAZEPAM 2 MG/ML
4 INJECTION INTRAMUSCULAR
Status: DISCONTINUED | OUTPATIENT
Start: 2022-03-12 | End: 2022-03-16

## 2022-03-12 RX ORDER — LORAZEPAM 2 MG/ML
3 INJECTION INTRAMUSCULAR
Status: DISCONTINUED | OUTPATIENT
Start: 2022-03-12 | End: 2022-03-16

## 2022-03-12 RX ORDER — ACETAMINOPHEN 325 MG/1
650 TABLET ORAL EVERY 6 HOURS PRN
Status: DISCONTINUED | OUTPATIENT
Start: 2022-03-12 | End: 2022-03-19 | Stop reason: HOSPADM

## 2022-03-12 RX ORDER — POLYETHYLENE GLYCOL 3350 17 G/17G
17 POWDER, FOR SOLUTION ORAL DAILY PRN
Status: DISCONTINUED | OUTPATIENT
Start: 2022-03-12 | End: 2022-03-19 | Stop reason: HOSPADM

## 2022-03-12 RX ORDER — SODIUM CHLORIDE 0.9 % (FLUSH) 0.9 %
5-40 SYRINGE (ML) INJECTION PRN
Status: DISCONTINUED | OUTPATIENT
Start: 2022-03-12 | End: 2022-03-19 | Stop reason: HOSPADM

## 2022-03-12 RX ORDER — POTASSIUM CHLORIDE 7.45 MG/ML
10 INJECTION INTRAVENOUS PRN
Status: DISCONTINUED | OUTPATIENT
Start: 2022-03-12 | End: 2022-03-17

## 2022-03-12 RX ORDER — MAGNESIUM SULFATE 1 G/100ML
1000 INJECTION INTRAVENOUS PRN
Status: DISCONTINUED | OUTPATIENT
Start: 2022-03-12 | End: 2022-03-19 | Stop reason: HOSPADM

## 2022-03-12 RX ORDER — ONDANSETRON 4 MG/1
4 TABLET, ORALLY DISINTEGRATING ORAL EVERY 8 HOURS PRN
Status: DISCONTINUED | OUTPATIENT
Start: 2022-03-12 | End: 2022-03-19 | Stop reason: HOSPADM

## 2022-03-12 RX ADMIN — MAGNESIUM SULFATE HEPTAHYDRATE 1000 MG: 1 INJECTION, SOLUTION INTRAVENOUS at 17:18

## 2022-03-12 RX ADMIN — POTASSIUM CHLORIDE 10 MEQ: 10 INJECTION, SOLUTION INTRAVENOUS at 18:39

## 2022-03-12 RX ADMIN — LORAZEPAM 2 MG: 2 INJECTION INTRAMUSCULAR; INTRAVENOUS at 21:43

## 2022-03-12 RX ADMIN — POTASSIUM CHLORIDE AND SODIUM CHLORIDE: 900; 150 INJECTION, SOLUTION INTRAVENOUS at 12:16

## 2022-03-12 RX ADMIN — POTASSIUM CHLORIDE 10 MEQ: 10 INJECTION, SOLUTION INTRAVENOUS at 17:17

## 2022-03-12 RX ADMIN — LORAZEPAM 4 MG: 2 INJECTION INTRAMUSCULAR; INTRAVENOUS at 23:58

## 2022-03-12 RX ADMIN — SODIUM CHLORIDE 6.3 UNITS/HR: 9 INJECTION, SOLUTION INTRAVENOUS at 17:36

## 2022-03-12 RX ADMIN — ENOXAPARIN SODIUM 30 MG: 100 INJECTION SUBCUTANEOUS at 21:42

## 2022-03-12 RX ADMIN — SODIUM CHLORIDE 3.1 UNITS/HR: 9 INJECTION, SOLUTION INTRAVENOUS at 17:55

## 2022-03-12 RX ADMIN — SODIUM CHLORIDE 1000 ML: 9 INJECTION, SOLUTION INTRAVENOUS at 09:03

## 2022-03-12 RX ADMIN — SODIUM CHLORIDE 1000 ML: 9 INJECTION, SOLUTION INTRAVENOUS at 08:29

## 2022-03-12 RX ADMIN — POTASSIUM CHLORIDE AND SODIUM CHLORIDE: 900; 150 INJECTION, SOLUTION INTRAVENOUS at 23:49

## 2022-03-12 RX ADMIN — FAMOTIDINE 20 MG: 10 INJECTION, SOLUTION INTRAVENOUS at 12:12

## 2022-03-12 RX ADMIN — SODIUM CHLORIDE 1000 ML: 9 INJECTION, SOLUTION INTRAVENOUS at 11:20

## 2022-03-12 RX ADMIN — FAMOTIDINE 20 MG: 10 INJECTION, SOLUTION INTRAVENOUS at 21:42

## 2022-03-12 RX ADMIN — SODIUM CHLORIDE 12.7 UNITS/HR: 9 INJECTION, SOLUTION INTRAVENOUS at 09:28

## 2022-03-12 RX ADMIN — ENOXAPARIN SODIUM 30 MG: 100 INJECTION SUBCUTANEOUS at 12:13

## 2022-03-12 RX ADMIN — SODIUM PHOSPHATE, MONOBASIC, MONOHYDRATE AND SODIUM PHOSPHATE, DIBASIC, ANHYDROUS 20 MMOL: 276; 142 INJECTION, SOLUTION INTRAVENOUS at 17:32

## 2022-03-12 ASSESSMENT — ENCOUNTER SYMPTOMS
BACK PAIN: 0
EYE PAIN: 0
COLOR CHANGE: 0
ABDOMINAL PAIN: 0
SHORTNESS OF BREATH: 0

## 2022-03-12 ASSESSMENT — PAIN SCALES - GENERAL: PAINLEVEL_OUTOF10: 0

## 2022-03-12 NOTE — ED NOTES
After two attempts for IV, unsuccessful, Dion Call RN to attempt US IV.        Zeb Schilder, RN  03/12/22 [id number]

## 2022-03-12 NOTE — PROGRESS NOTES
Patient to 2009 from ED. Patient attached to bedside cardiac monitor. Assessment completed. Vital signs obtained. Patient oriented to person and place only.

## 2022-03-12 NOTE — ED PROVIDER NOTES
EMERGENCY DEPARTMENT ENCOUNTER    Pt Name: Martha Lima  MRN: [id number]  [place] 1980  Date of evaluation: 3/12/22  CHIEF COMPLAINT       Chief Complaint   Patient presents with    Alcohol Intoxication     HISTORY OF PRESENT ILLNESS   14-year-old female presents for complaint of alcohol intoxication. Patient reportedly has been drinking all night,  went to try and get her up this morning and was having difficulty getting her to the bathroom and called EMS and she was brought for evaluation. Patient denying complaints at this time, does have a history of diabetes, patient is unsure when she last took her insulin, denies chest pain, shortness of breath, fevers chills or abdominal pain, denies any nausea or vomiting, denies any recent falls or head injuries. Does admit to significant alcohol use last night. The history is provided by the patient. REVIEW OF SYSTEMS     Review of Systems   Constitutional: Negative for fever. HENT: Negative for congestion and ear pain. Eyes: Negative for pain. Respiratory: Negative for shortness of breath. Cardiovascular: Negative for chest pain, palpitations and leg swelling. Gastrointestinal: Negative for abdominal pain. Genitourinary: Negative for dysuria and flank pain. Musculoskeletal: Negative for back pain. Skin: Negative for color change. Neurological: Negative for numbness and headaches. Psychiatric/Behavioral: Negative for confusion. All other systems reviewed and are negative.     PASTMEDICAL HISTORY     Past Medical History:   Diagnosis Date    Alcohol abuse     sober since5    Anxiety     Arthritis     Painting esophagus     Benzodiazepine overdose 9/26/2015    Bipolar disorder, unspecified ([address]     Bipolar I disorder, most recent episode depressed, severe without psychotic features ([address]     Cellulitis 11/17/2018    Chronic abdominal wound infection 9/27/2015    Constipation 9/3/2019    Depression 7/12/2015    Drug overdose, intentional ([facility] 7/12/2015    Genital herpes, unspecified     GERD (gastroesophageal reflux disease)     History of pulmonary embolism     Hx of blood clots dx 2 years ago    clots in both legs and lungs     Hypertension     Iron deficiency anemia     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    Lumbosacral spondylosis without myelopathy     MDRO (multiple drug resistant organisms) resistance 2010    MRSA (abd)    Miscarriage     multiple, around 7th mos pregnant each time d/t hypercoagulation    Morbid obesity ([facility]     MRSA (methicillin resistant staph aureus) culture positive 02/10/2017    urine    Overdose of benzodiazepine     Pernicious anemia     Primary hypercoagulable state ([facility]     antiphospholipid antibodies on Arixtra shots daily    Pulmonary embolism ([facility] 2010    Suicidal behavior 12/14/2015    Suicidal ideation     Suicide attempt ([facility] 2014    hx OD on painkillers and rubbing alcohol    [facility] 04/07/2017    Toxic effect of ethanol, intentional self-harm ([facility] 12/16/2015    Type 2 diabetes mellitus, with long-term current use of insulin ([facility])      Past Problem List  Patient Active Problem List   Diagnosis Code    Pernicious anemia D51.0    History of ulcer disease Z87.898    History of DVT of lower extremity Z86.718    Primary hypercoagulable state ([facility] D68.59    Amputation finger S68.119A    GERD (gastroesophageal reflux disease) K21.9    Alcohol dependence in remission ([facility] F10.21    Type 2 diabetes mellitus with diabetic polyneuropathy, with long-term current use of insulin ([facility]) E11.42, Z79.4    Primary insomnia F51.01    Essential hypertension I10    Bipolar affective disorder in remission ([facility] F31.70    History of pulmonary embolism Z86.711    Antiphospholipid syndrome ([facility]) D68.61    Hypertriglyceridemia E78.1    Chronic post-traumatic stress disorder (PTSD) F43.12    OCD (obsessive compulsive disorder) F42.9    Severe benzodiazepine use disorder (HCC) F13.20    Morbid obesity with BMI of 50.0-59.9, adult (HCC) E66.01, Z68.43    History of MRSA infection Z86.14    Pain of upper abdomen R10.10    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    NAFLD (nonalcoholic fatty liver disease) K76.0    Nondependent opioid abuse in remission ([facility] F11.11    Osteopenia M85.80    History of Awa-en-Y gastric bypass Z98.84    Herpes genitalis A60.00    History of drug abuse (HCC) F19.11    Malabsorption K90.9    History of pulmonary embolus (PE) Z86.711    Vitamin B 12 deficiency E53.8    Vitamin D deficiency E55.9    History of surgery of liver Z98.890    Blood alkaline phosphatase increased compared with prior measurement R74.8    Paroxysmal SVT (supraventricular tachycardia) (HCC) I47.1    Anxiety F41.9    Hypomagnesemia E83.42    Chronic idiopathic constipation K59.04    Recurrent incisional hernia K43.2    History of small bowel obstruction Z87.19    History of peptic ulcer Z87.11    Fibromyalgia M79.7    Polyneuropathy G62.9    COVID-19 virus infection U07.1    DKA, type 2, not at goal [facility]) E11.10     SURGICAL HISTORY       Past Surgical History:   Procedure Laterality Date    ABDOMINAL HERNIA REPAIR  2014    wound vac    ABDOMINAL HERNIA REPAIR  11/3/14    BARIATRIC SURGERY  2013    [facility]    CHOLECYSTECTOMY      DILATION AND CURETTAGE OF UTERUS  2005    ENDOSCOPY, COLON, DIAGNOSTIC      EGD    FINGER AMPUTATION Left 02/09/2015    index and ring finger.  from [address] [place] [address] 289      total of 8    IVC FILTER INSERTION      LIVER RESECTION      for hepatic adenoma    LYMPH NODE BIOPSY  1990    JUSTINE AND BSO  2011    TONSILLECTOMY       CURRENT MEDICATIONS       Current Discharge Medication List      CONTINUE these medications which have NOT CHANGED    Details   ondansetron (ZOFRAN ODT) 4 MG disintegrating tablet Take 1 tablet by mouth every 8 hours as needed for Nausea  Qty: 20 21 capsule, Refills: 0    Associated Diagnoses: Type 2 diabetes mellitus with diabetic polyneuropathy, with long-term current use of insulin ([address] Tooth abscess      sucralfate (CARAFATE) 1 GM tablet DISSOLVE 1 TABLET INTO 15 ML( 1 TABLESPOON) OF WATER AND DRINK BY MOUTH FOUR TIMES DAILY AS NEEDED FOR GERD  Qty: 360 tablet, Refills: 5    Associated Diagnoses: Painting's esophagus without dysplasia; Pain of upper abdomen      dilTIAZem (DILTIAZEM CD) 240 MG extended release capsule Take 240 mg by mouth daily Take one capsule by mouth daily. pravastatin (PRAVACHOL) 40 MG tablet TAKE 1 TABLET(40 MG) BY MOUTH DAILY  Qty: 90 tablet, Refills: 3    Associated Diagnoses: Hypertriglyceridemia; NAFLD (nonalcoholic fatty liver disease)      valACYclovir (VALTREX) 500 MG tablet Take 1 tablet by mouth daily  Qty: 90 tablet, Refills: 3    Associated Diagnoses: Herpes simplex infection of other site of genitourinary tract      ondansetron (ZOFRAN) 4 MG tablet Take 1 tablet by mouth 3 times daily as needed for Nausea or Vomiting  Qty: 15 tablet, Refills: 0      acetaminophen (TYLENOL) 500 MG tablet Take 2 tablets by mouth every 8 hours as needed for Pain  Qty: 20 tablet, Refills: 0      LORazepam (ATIVAN) 2 MG tablet       GLUCAGEN HYPOKIT 1 MG SOLR injection       glucose monitoring kit (FREESTYLE) monitoring kit Testing twice a day. Please dispense according to patients insurance. Qty: 1 kit, Refills: 0    Associated Diagnoses: Type 2 diabetes mellitus with diabetic polyneuropathy, with long-term current use of insulin ([facility])      Insulin Pen Needle 31G X 5 MM MISC 1 each by Does not apply route daily  Qty: 100 each, Refills: 5    Associated Diagnoses: Type 2 diabetes mellitus with diabetic polyneuropathy, with long-term current use of insulin ([facility])      Lancets 30G MISC Testing twice a day. Please dispense according to patients insurance.   Qty: 200 each, Refills: 3    Associated Diagnoses: Type 2 diabetes mellitus with diabetic polyneuropathy, with long-term current use of insulin ([facility])      blood glucose monitor strips Testing twice a day. Please dispense according to patients insurance. Qty: 200 strip, Refills: 3    Associated Diagnoses: Type 2 diabetes mellitus with diabetic polyneuropathy, with long-term current use of insulin ([facility])      KLOR-CON M10 10 MEQ extended release tablet Take 2 tablets by mouth daily as needed (take with bumex)  Qty: 60 tablet, Refills: 3    Associated Diagnoses: Essential hypertension      bumetanide (BUMEX) 0.5 MG tablet TAKE 1 TABLET BY MOUTH DAILY AS NEEDED FOR LEG SWELLING  Qty: 90 tablet, Refills: 0    Comments: **Patient requests 90 days supply**  Associated Diagnoses: Peripheral edema      lamoTRIgine (LAMICTAL) 200 MG tablet Take 200 mg by mouth daily    Associated Diagnoses: Bipolar affective disorder in remission ([facility])      amphetamine-dextroamphetamine (ADDERALL, 30MG,) 30 MG tablet Take 30 mg by mouth daily. dicyclomine (BENTYL) 10 MG capsule Take 1 capsule by mouth every 6 hours as needed (cramps)  Qty: 20 capsule, Refills: 0      QUEtiapine (SEROQUEL) 100 MG tablet Take 50 mg by mouth nightly as needed       Multiple Vitamins-Minerals (THERAPEUTIC MULTIVITAMIN-MINERALS) tablet Take 1 tablet by mouth daily      buPROPion (WELLBUTRIN XL) 300 MG extended release tablet Take 300 mg by mouth every morning      vitamin D (CHOLECALCIFEROL) 1000 UNIT TABS tablet Take 10,000 Units by mouth daily 5 days a week           ALLERGIES     is allergic to asa [aspirin], betadine [povidone iodine], celexa [citalopram hydrobromide], citalopram, lasix [furosemide], macrobid [nitrofurantoin], norco [hydrocodone-acetaminophen], betadine [povidone iodine], codeine, lithium, paroxetine, paxil [paroxetine hcl], tape [adhesive tape], and tegretol [carbamazepine]. FAMILY HISTORY     She indicated that her mother is alive. She indicated that her father is alive.  She indicated that the status of her maternal aunt is unknown. She indicated that the status of her maternal uncle is unknown. She indicated that the status of her maternal cousin is unknown. SOCIAL HISTORY       Social History     Tobacco Use    Smoking status: Never Smoker    Smokeless tobacco: Never Used   Substance Use Topics    Alcohol use: No     Alcohol/week: 0.0 standard drinks     Comment: Last alcohol use was in 12/2015    Drug use: No     Comment: Pt stole her mom's Benzo 1 yr ago. Pt said she took more than prescribed dose of Valium once in late 90's. PHYSICAL EXAM     INITIAL VITALS: /70   Pulse 130   Temp 98.9 °F (37.2 °C) (Oral)   Resp 25   Ht 5' 8\" (1.727 m)   Wt 280 lb (127 kg)   SpO2 96%   BMI 42.57 kg/m²    Physical Exam  Vitals and nursing note reviewed. Constitutional:       General: She is not in acute distress. Appearance: Normal appearance. She is not toxic-appearing. HENT:      Head: Normocephalic and atraumatic. Nose: Nose normal.      Mouth/Throat:      Mouth: Mucous membranes are dry. Pharynx: Oropharynx is clear. Eyes:      General: No visual field deficit. Extraocular Movements: Extraocular movements intact. Conjunctiva/sclera: Conjunctivae normal.   Cardiovascular:      Rate and Rhythm: Regular rhythm. Tachycardia present. Pulses: Normal pulses. Heart sounds: Normal heart sounds. Pulmonary:      Effort: Pulmonary effort is normal.      Breath sounds: Normal breath sounds. Abdominal:      General: Bowel sounds are normal. There is no distension. Palpations: Abdomen is soft. Tenderness: There is no abdominal tenderness. Musculoskeletal:         General: Normal range of motion. Cervical back: Normal range of motion. Skin:     General: Skin is warm and dry. Capillary Refill: Capillary refill takes less than 2 seconds. Neurological:      General: No focal deficit present.       Mental Status: She is alert and oriented to person, place, and time. Cranial Nerves: Cranial nerves are intact. No cranial nerve deficit, dysarthria or facial asymmetry. Sensory: Sensation is intact. No sensory deficit. Motor: Motor function is intact. No weakness. Coordination: Coordination is intact. Psychiatric:         Mood and Affect: Mood normal.         MEDICAL DECISION MAKIN-year-old female presents for complaint of alcohol intoxication, on initial exam patient in no acute distress, patient notably mildly tachycardic, patient does have a history of diabetes, unclear when she last took insulin, will check labs    Labs reviewed patient found to have an initial glucose of 1255, CO2 of 13 gap of 24, initial pH of 7.1, alcohol of 404, finding concerning for DKA as well as possible AKA, patient was started on IV fluids, insulin drip was initiated    Blood pressure improved after fluids, mental status improving, will admit to ICU    Spoke with Dr. Kevin Luu who accepts admission with critical care consult. Patient demonstrates understanding and agreement with the plan, was given the opportunity to ask questions, and these questions were answered to the best of the provided information at this time. VS stable for transfer. This dictation was prepared using [employer] voice recognition software. CRITICAL CARE: 47 min      PROCEDURES:    Procedures    DIAGNOSTIC RESULTS   EKG:All EKG's are interpreted by the Emergency Department Physician who either signs or Co-signs this chart in the absence of a cardiologist.        RADIOLOGY:All plain film, CT, MRI, and formal ultrasound images (except ED bedside ultrasound) are read by the radiologist, see reports below, unless otherwisenoted in MDM or here. CT Head WO Contrast   Final Result   No acute intracranial abnormality. XR CHEST PORTABLE   Final Result   Low lung volumes.   Diffuse interstitial opacities in the perihilar areas with   left perihilar and lower lobe alveolar type opacities consistent with   multifocal airspace disease. LABS: All lab results were reviewed by myself, and all abnormals are listed below.   Labs Reviewed   CBC WITH AUTO DIFFERENTIAL - Abnormal; Notable for the following components:       Result Value    Hematocrit 50.7 (*)     .2 (*)     MCHC 30.5 (*)     Seg Neutrophils 81 (*)     Lymphocytes 5 (*)     Absolute Lymph # 0.41 (*)     All other components within normal limits   COMPREHENSIVE METABOLIC PANEL W/ REFLEX TO MG FOR LOW K - Abnormal; Notable for the following components:    Glucose 1,255 (*)     BUN 31 (*)     CREATININE 1.50 (*)     Calcium 7.4 (*)     Sodium 111 (*)     Chloride 74 (*)     CO2 13 (*)     Anion Gap 24 (*)     Alkaline Phosphatase 411 (*)      (*)     AST 2,478 (*)     Total Bilirubin 1.75 (*)     Total Protein 5.5 (*)     Albumin 2.8 (*)     GFR Non- 38 (*)     GFR  46 (*)     All other components within normal limits   LIPASE - Abnormal; Notable for the following components:    Lipase 257 (*)     All other components within normal limits   TROPONIN - Abnormal; Notable for the following components:    Troponin, High Sensitivity 42 (*)     All other components within normal limits   PROTIME-INR - Abnormal; Notable for the following components:    Protime 15.6 (*)     All other components within normal limits   URINALYSIS WITH MICROSCOPIC - Abnormal; Notable for the following components:    Glucose, Ur LARGE (*)     Urine Hgb SMALL (*)     Protein, UA TRACE (*)     Bacteria, UA FEW (*)     Amorphous, UA 1+ (*)     Yeast, UA FEW (*)     All other components within normal limits   BLOOD GAS, VENOUS - Abnormal; Notable for the following components:    pH, Bryant 7.187 (*)     pCO2, Bryant 37.9 (*)     HCO3, Venous 14.4 (*)     Negative Base Excess, Bryant 13.9 (*)     O2 Sat, Bryant 53.5 (*)     All other components within normal limits   BETA-HYDROXYBUTYRATE - Abnormal; Notable for the following components:    Beta-Hydroxybutyrate 0.57 (*)     All other components within normal limits   LACTIC ACID - Abnormal; Notable for the following components:    Lactic Acid 10.2 (*)     All other components within normal limits   LACTIC ACID - Abnormal; Notable for the following components:    Lactic Acid 11.1 (*)     All other components within normal limits   ETHANOL - Abnormal; Notable for the following components:    Ethanol 404 (*)     All other components within normal limits   BASIC METABOLIC PANEL - Abnormal; Notable for the following components:    Glucose 695 (*)     BUN 35 (*)     CREATININE 1.23 (*)     Calcium 7.1 (*)     Sodium 127 (*)     Chloride 94 (*)     CO2 13 (*)     Anion Gap 20 (*)     GFR Non- 48 (*)     GFR  58 (*)     All other components within normal limits   BASIC METABOLIC PANEL - Abnormal; Notable for the following components:    Glucose 682 (*)     BUN 36 (*)     CREATININE 1.22 (*)     Calcium 7.2 (*)     Sodium 125 (*)     Potassium 5.4 (*)     Chloride 93 (*)     CO2 13 (*)     Anion Gap 19 (*)     GFR Non- 48 (*)     GFR  59 (*)     All other components within normal limits   MAGNESIUM - Abnormal; Notable for the following components:    Magnesium 2.7 (*)     All other components within normal limits   PHOSPHORUS - Abnormal; Notable for the following components:    Phosphorus 1.1 (*)     All other components within normal limits   PHOSPHORUS - Abnormal; Notable for the following components:    Phosphorus 2.1 (*)     All other components within normal limits   PROCALCITONIN - Abnormal; Notable for the following components:    Procalcitonin 0.11 (*)     All other components within normal limits   GLUCOSE, RANDOM - Abnormal; Notable for the following components:    Glucose 623 (*)     All other components within normal limits   POC GLUCOSE FINGERSTICK - Abnormal; Notable for the following components:    POC Glucose >600 (*) All other components within normal limits   CULTURE, BLOOD 1   CULTURE, BLOOD 2   APTT   MAGNESIUM   BASIC METABOLIC PANEL   MAGNESIUM   PHOSPHORUS   BASIC METABOLIC PANEL   MAGNESIUM   PHOSPHORUS   BASIC METABOLIC PANEL   MAGNESIUM   PHOSPHORUS   GLUCOSE, RANDOM   GLUCOSE, RANDOM   GLUCOSE, RANDOM   GLUCOSE, RANDOM   GLUCOSE, RANDOM   GLUCOSE, RANDOM   GLUCOSE, RANDOM   GLUCOSE, RANDOM   GLUCOSE, RANDOM   GLUCOSE, RANDOM   GLUCOSE, RANDOM   POCT GLUCOSE   POCT GLUCOSE   POCT GLUCOSE   POCT GLUCOSE   POCT GLUCOSE   POCT GLUCOSE   POCT GLUCOSE   POCT GLUCOSE   POCT GLUCOSE   POCT GLUCOSE   POCT GLUCOSE   POCT GLUCOSE   POCT GLUCOSE   POCT GLUCOSE   POCT GLUCOSE   POCT GLUCOSE   POCT GLUCOSE   POCT GLUCOSE   POCT GLUCOSE   POCT GLUCOSE   POCT GLUCOSE   POCT GLUCOSE   POCT GLUCOSE   POCT GLUCOSE   POCT GLUCOSE   POCT GLUCOSE   POCT GLUCOSE   POCT GLUCOSE   POCT GLUCOSE   POCT GLUCOSE   POCT GLUCOSE   POCT GLUCOSE   POCT GLUCOSE   POCT GLUCOSE   POCT GLUCOSE   POCT GLUCOSE   POCT GLUCOSE   POCT GLUCOSE   POCT GLUCOSE   POCT GLUCOSE   POCT GLUCOSE   POCT GLUCOSE   POCT GLUCOSE   POCT GLUCOSE   POCT GLUCOSE       EMERGENCY DEPARTMENTCOURSE:         Vitals:    Vitals:    03/12/22 1311 03/12/22 1645 03/12/22 1715 03/12/22 1845   BP: 114/73 92/61 96/64 121/70   Pulse: 126 132 134 130   Resp: 20 26 22 25   Temp:   97.2 °F (36.2 °C) 98.9 °F (37.2 °C)   TempSrc:    Oral   SpO2: 95%   96%   Weight:       Height:           The patient was given the following medications while in the emergency department:  Orders Placed This Encounter   Medications    0.9 % sodium chloride bolus    0.9 % sodium chloride bolus    DISCONTD: potassium chloride 20 mEq in sodium chloride 0.9 % 1,000 mL infusion    glucose (GLUTOSE) 40 % oral gel 15 g    dextrose 50 % IV solution    glucagon (rDNA) injection 1 mg    dextrose 5 % solution    insulin regular (HUMULIN R;NOVOLIN R) 100 Units in sodium chloride 0.9 % 100 mL infusion    0.9 % sodium chloride bolus    0.9% NaCl with KCl 20 mEq infusion    DISCONTD: dextrose 50 % IV solution    potassium chloride 10 mEq/100 mL IVPB (Peripheral Line)    magnesium sulfate 1000 mg in dextrose 5% 100 mL IVPB    OR Linked Order Group     sodium phosphate 10 mmol in dextrose 5 % 250 mL IVPB     sodium phosphate 15 mmol in dextrose 5 % 250 mL IVPB     sodium phosphate 20 mmol in dextrose 5 % 500 mL IVPB    polyethylene glycol (GLYCOLAX) packet 17 g    DISCONTD: enoxaparin (LOVENOX) injection 40 mg    0.9 % sodium chloride infusion    DISCONTD: insulin regular (HUMULIN R;NOVOLIN R) 100 Units in sodium chloride 0.9 % 100 mL infusion    dextrose 5 % and 0.45 % sodium chloride infusion    sodium chloride flush 0.9 % injection 5-40 mL    sodium chloride flush 0.9 % injection 5-40 mL    0.9 % sodium chloride infusion    enoxaparin (LOVENOX) injection 30 mg    OR Linked Order Group     ondansetron (ZOFRAN-ODT) disintegrating tablet 4 mg     ondansetron (ZOFRAN) injection 4 mg    OR Linked Order Group     acetaminophen (TYLENOL) tablet 650 mg     acetaminophen (TYLENOL) suppository 650 mg    famotidine (PEPCID) injection 20 mg    OR Linked Order Group     LORazepam (ATIVAN) tablet 1 mg     LORazepam (ATIVAN) injection 1 mg     LORazepam (ATIVAN) tablet 2 mg     LORazepam (ATIVAN) injection 2 mg     LORazepam (ATIVAN) tablet 3 mg     LORazepam (ATIVAN) injection 3 mg     LORazepam (ATIVAN) tablet 4 mg     LORazepam (ATIVAN) injection 4 mg    sodium chloride flush 0.9 % injection 5-40 mL     CONSULTS:  IP CONSULT TO INTERNAL MEDICINE  IP CONSULT TO PULMONOLOGY  IP CONSULT TO SOCIAL WORK  IP CONSULT TO SOCIAL WORK    FINAL IMPRESSION      1. Diabetic ketoacidosis without coma associated with type 2 diabetes mellitus ([facility]          DISPOSITION/PLAN   DISPOSITION Admitted 03/12/2022 10:50:32 AM      PATIENT REFERRED TO:  No follow-up provider specified.   DISCHARGE MEDICATIONS:  Current Discharge Medication List        The care is provided during an unprecedented national emergency due to the novel coronavirus, COVID 19.   [address], DO                   [address], DO  03/12/22 7700

## 2022-03-12 NOTE — LETTER
[address]  Phone: [phone]    No name on file. March 19, 2022     Patient: Tomas Burch   YOB: 1980   Date of Visit: 3/12/2022       To Whom it May Concern:    Mayelin Osuna was admitted in the hospital on 3/12/2022 to 3/19/2022. She may return to work on 3/28/2022. If you have any questions or concerns, please don't hesitate to call. Sincerely,         No name on file.

## 2022-03-12 NOTE — ED NOTES
Discussed insulin drip guidelines with pharmacist.  FSBS >600, leave insulin drip as is until able to obtain lab draw for accurate number.      Reyes Gould RN  03/12/22 5219

## 2022-03-12 NOTE — ED NOTES
Insulin infusion decreased by 50% per protocol.  Dr. Yolande Rosales notified of the change and approved change     Talisha Sol RN  03/12/22 1156

## 2022-03-12 NOTE — FLOWSHEET NOTE
Patient is anxious. Mom is angry that patient continues to drink and endanger herself. Writer provided listening presence and prayer. 03/12/22 1422   Encounter Summary   Services provided to: Patient and family together   Referral/Consult From: [address] [place] [address] Significant other;Parent; Children;Family members   Continue Visiting   (3-12-22)   Complexity of Encounter High   Length of Encounter 15 minutes   Spiritual/Scientology   Type Spiritual support   Assessment Approachable; Anxious   Intervention Active listening;Explored feelings, thoughts, concerns;Prayer;Sustaining presence/ Ministry of presence; Discussed illness/injury and it's impact   Outcome Expressed gratitude;Engaged in conversation;Expressed feelings/needs/concerns;Venting emotion

## 2022-03-12 NOTE — ED NOTES
Paged Dr. Renae Boyle for [facility], [facility] @ 1647.       Anais Root  03/12/22 3586

## 2022-03-12 NOTE — ED NOTES
Blood sugar machine said exceed 600, so I showed Waleska the RN and told Dr. Nina Azul.      Simeon Marrow  03/12/22 1210

## 2022-03-12 NOTE — ED NOTES
Called access RN for a central pick line at 1320. Will call back when RN is available.      Cherry Jain  03/12/22 5214

## 2022-03-12 NOTE — CONSULTS
History:   Diagnosis Date    Alcohol abuse     sober [id number]    Anxiety     Arthritis     Painting esophagus     Benzodiazepine overdose 9/26/2015    Bipolar disorder, unspecified ([address]     Bipolar I disorder, most recent episode depressed, severe without psychotic features ([address]     Cellulitis 11/17/2018    Chronic abdominal wound infection 9/27/2015    Constipation 9/3/2019    Depression 7/12/2015    Drug overdose, intentional ([address] 7/12/2015    Genital herpes, unspecified     GERD (gastroesophageal reflux disease)     History of pulmonary embolism     Hx of blood clots dx 2 years ago    clots in both legs and lungs     Hypertension     Iron deficiency anemia     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    Lumbosacral spondylosis without myelopathy     MDRO (multiple drug resistant organisms) resistance 2010    MRSA (abd)    Miscarriage     multiple, around 7th mos pregnant each time d/t hypercoagulation    Morbid obesity ([address]     MRSA (methicillin resistant staph aureus) culture positive 02/10/2017    urine    Overdose of benzodiazepine     Pernicious anemia     Primary hypercoagulable state ([address]     antiphospholipid antibodies on Arixtra shots daily    Pulmonary embolism ([address] 2010    Suicidal behavior 12/14/2015    Suicidal ideation     Suicide attempt ([address] 2014    hx OD on painkillers and rubbing alcohol    SVT (supraventricular tachycardia) ([address] 04/07/2017    Toxic effect of ethanol, intentional self-harm ([address] 12/16/2015    Type 2 diabetes mellitus, with long-term current use of insulin ([address]        PAST SURGICAL HISTORY:  Past Surgical History:   Procedure Laterality Date    ABDOMINAL HERNIA REPAIR  2014    wound vac    ABDOMINAL HERNIA REPAIR  11/3/14    BARIATRIC SURGERY  2013    [facility]    CHOLECYSTECTOMY      DILATION AND CURETTAGE OF UTERUS  2005    ENDOSCOPY, COLON, DIAGNOSTIC      EGD    FINGER AMPUTATION Left 02/09/2015    index and ring finger. from frostbite    HERNIA REPAIR      total of 8    IVC FILTER INSERTION      LIVER RESECTION      for hepatic adenoma    LYMPH NODE BIOPSY  1990    JUSTINE AND BSO  2011    TONSILLECTOMY            SOCIAL HISTORY:  Social History     Socioeconomic History    Marital status:      Spouse name: Not on file    Number of children: 1    Years of education: 3 years of college    Highest education level: Not on file   Occupational History    Occupation: infant care     Comment: last worked 2008   Tobacco Use    Smoking status: Never Smoker    Smokeless tobacco: Never Used   Substance and Sexual Activity    Alcohol use: No     Alcohol/week: 0.0 standard drinks     Comment: Last alcohol use was in 12/2015    Drug use: No     Comment: Pt stole her mom's Benzo 1 yr ago. Pt said she took more than prescribed dose of Valium once in late 90's.  Sexual activity: Yes     Partners: Male   Other Topics Concern    Not on file   Social History Narrative    Lives with Wicho Godfrey ex  and daughter          Social Determinants of Health     Financial Resource Strain:     Difficulty of Paying Living Expenses: Not on file   Food Insecurity:     Worried About Running Out of Food in the Last Year: Not on file    [place] of Food in the Last Year: Not on file   Transportation Needs:     Lack of Transportation (Medical): Not on file    Lack of Transportation (Non-Medical):  Not on file   Physical Activity:     Days of Exercise per Week: Not on file    Minutes of Exercise per Session: Not on file   Stress:     Feeling of Stress : Not on file   Social Connections:     Frequency of Communication with Friends and Family: Not on file    Frequency of Social Gatherings with Friends and Family: Not on file    Attends Restorationism Services: Not on file    Active Member of Clubs or Organizations: Not on file    Attends Club or Organization Meetings: Not on file    Marital Status: Not on file   Intimate Partner Violence:  Fear of Current or Ex-Partner: Not on file    Emotionally Abused: Not on file    Physically Abused: Not on file    Sexually Abused: Not on file   Housing Stability:     Unable to Pay for Housing in the Last Year: Not on file    Number of Places Lived in the Last Year: Not on file    Unstable Housing in the Last Year: Not on file       FAMILY HISTORY:  Family History   Problem Relation Age of Onset    Depression Mother     High Blood Pressure Mother     High Cholesterol Mother     Diabetes Father     Heart Disease Father     Kidney Disease Father     High Blood Pressure Father     High Cholesterol Father     Cancer Maternal Uncle         of duodenum had whipple    Breast Cancer Maternal Aunt     Breast Cancer Maternal Cousin        REVIEW OF SYSTEMS:  Cannot believe fully obtained    PHYSICAL EXAM:  Vital Signs Blood pressure 124/83, pulse 119, temperature 97.3 °F (36.3 °C), temperature source Oral, resp. rate 18, height 5' 8\" (1.727 m), weight 280 lb (127 kg), SpO2 100 %, unknown if currently breastfeeding. Oxygen Amount and Delivery: O2 Flow Rate (L/min): 2 L/min    Admission Weight Weight: 280 lb (127 kg)    General Appearance   Middle-aged female in no acute respiratory distress  Head  Normocephalic, without obvious abnormality, atraumatic    Eyes  conjunctivae/corneas clear. PERRL, EOM's intact. Fundi benign. ENT oral cavity clear without thrush  Neck  no adenopathy, no carotid bruit, no JVD, supple, symmetrical, trachea midline and thyroid not enlarged, symmetric, no tenderness/mass/nodules  Lungs diminished no wheezes or rhonchi  Heart: regular rate and rhythm, S1, S2 normal, no murmur, click, rub or gallop  Abdomen  soft, non-tender; bowel sounds normal; no masses,  no organomegaly  Extremities    Skin  Skin color, texture, turgor normal. No rashes or lesions  Neurologic: Alert and oriented X 3, normal strength and tone.          Imaging      Lab Review  CBC     Lab Results   Component Value Date    WBC 8.1 03/12/2022    RBC 5.00 03/12/2022    RBC 4.43 06/03/2012    HGB 15.5 03/12/2022    HCT 50.7 03/12/2022     03/12/2022     06/03/2012    .2 03/12/2022    MCH 30.9 03/12/2022    MCHC 30.5 03/12/2022    RDW 14.9 03/12/2022    LYMPHOPCT 5 03/12/2022    MONOPCT 4 03/12/2022    BASOPCT 0 03/12/2022    MONOSABS 0.32 03/12/2022    LYMPHSABS 0.41 03/12/2022    EOSABS 0.00 03/12/2022    BASOSABS 0.00 03/12/2022    DIFFTYPE NOT REPORTED 02/01/2022       BMP   Lab Results   Component Value Date     03/12/2022    K 4.5 03/12/2022    CL 74 03/12/2022    CO2 13 03/12/2022    BUN 31 03/12/2022    CREATININE 1.50 03/12/2022    GLUCOSE 1,255 03/12/2022    GLUCOSE 403 06/03/2012    CALCIUM 7.4 03/12/2022       LFTS  Lab Results   Component Value Date    ALKPHOS 411 03/12/2022     03/12/2022    AST 2,478 03/12/2022    PROT 5.5 03/12/2022    BILITOT 1.75 03/12/2022    BILIDIR <0.08 02/01/2022    IBILI Can not be calculated 02/01/2022    LABALBU 2.8 03/12/2022    LABALBU 3.9 06/03/2012       INR  Recent Labs     03/12/22  0749   PROTIME 15.6*   INR 1.2       APTT  Recent Labs     03/12/22  0749   APTT 35.1       Lactic Acid  Lab Results   Component Value Date    LACTA 10.2 03/12/2022    LACTA NOT REPORTED 12/31/2021    LACTA 1.0 04/26/2020        PRO-BNP   No results for input(s): PROBNP in the last 72 hours.         ABGs:   Lab Results   Component Value Date    PHART 7.437 01/20/2016    PO2ART 90.3 01/20/2016    BYD2KXI 40.7 01/20/2016       Lab Results   Component Value Date    MODE NOT REPORTED 09/25/2017         Impression    Acute alcohol intoxication  Severe diabetic keto/metabolic acidosis (anion gap, lactic acid)  History of hypercoagulable state, antiphospholipid antibody  Morbid obesity  SAMIR      Plan:      Aggressive IV fluid management  Follow blood sugars and basic metabolic panel closely  Follow-up lactic acid panel  Follow-up electrolytes replace as needed  DKA protocol  Clinically, does not have evidence of pneumonia  Continue DVT prophylaxis I would not place her on Xarelto especially given her current history  GI prophylaxis with IV Pepcid  Check procalcitonin level, if elevated, would consider starting Unasyn and Zithromax  She apparently had been sober for long period of time, this is quite unfortunate and will need alcohol counseling  Down the road, if she stays longer than 2 to 3 days, will need to probably place her on the CIWA protocol for alcohol withdrawal  ICU admission  Critical care time spent 35 minutes

## 2022-03-12 NOTE — ED NOTES
Patient remains drowsy. Requests oral fluids. Instructed on need to remain NPO. Falls back to sleep easily. Call light in reach.      Shanon Winkler RN  03/12/22 7363

## 2022-03-12 NOTE — ED NOTES
Access RN called and stated a RN will be into the ED between 3108-2545.       Hailey Jordan  03/12/22 5538

## 2022-03-12 NOTE — H&P
[address] [place] [address]     HISTORY AND PHYSICAL EXAMINATION            Date:   3/12/2022  Patient name:  Paola Khan  Date of admission:  3/12/2022  7:42 AM  MRN:   [id number]  Account:  [de-identified]  YOB: 1980  PCP:    Robert Shabazz  Room:   04/04  Code Status:    Prior    Chief Complaint:     Chief Complaint   Patient presents with    Alcohol Intoxication       History Obtained From:     patient, non-family caregiver -ED physician, electronic medical record    History of Present Illness: The patient is a 42 y.o. Non- / non  female who presents with Alcohol Intoxication   and she is admitted to the hospital for the management of alcohol intoxication and DKA. Patient is a 40-year-old female with medical history of anxiety, bipolar disorder, history of pulmonary embolism, hypertension, iron deficiency anemia, suicidal ideation, type 2 diabetes mellitus presents with chief complaint of alcohol intoxication. Patient reports that she has not been taking insulin for the last few days as she \"forgot\". Patient was quite somnolent at time of exam.  She did not report any significant pain and significant alcohol use last night. In the ED, patient was found to be tachycardic and hypoxic. Patient was placed on 2 L of oxygen via nasal cannula. Patient's VBG's were remarkable for pH of 7.187. Other remarkable labs include sodium of 111, creatinine of 1.50, lactic acid of 10.2, glucose of 1255, alkaline phosphatase of 411, ALT of 504, AST of 2478, ethanol level of 404.   Remarkable studies include:  UA negative for ketones, nitrites, leukocyte esterase  Chest x-ray showing diffuse interstitial opacities in the perihilar areas consistent with multi focal airspace disease  CT head without contrast showing no acute intracranial abnormality    Patient was treated in the ED with fluids and insulin continuous infusion. Patient was placed on DKA protocol and decision was made to admit the patient to the ICU with consults in place for pulmonology critical care.     Past Medical History:     Past Medical History:   Diagnosis Date    Alcohol abuse     sober [id number]    Anxiety     Arthritis     Painting esophagus     Benzodiazepine overdose 9/26/2015    Bipolar disorder, unspecified ([address]     Bipolar I disorder, most recent episode depressed, severe without psychotic features ([address]     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 benzodiazepine     Pernicious anemia     Primary hypercoagulable state ([address]     antiphospholipid antibodies on Arixtra shots daily    Pulmonary embolism ([address] 2010    Suicidal behavior 12/14/2015    Suicidal ideation     Suicide attempt ([address] 2014    hx OD on painkillers and rubbing alcohol    SVT (supraventricular tachycardia) ([address] 04/07/2017    Toxic effect of ethanol, intentional self-harm ([address] 12/16/2015    Type 2 diabetes mellitus, with long-term current use of insulin (HCC)         Past SurgicalHistory:     Past Surgical History:   Procedure Laterality Date    ABDOMINAL HERNIA REPAIR  2014    wound vac    ABDOMINAL HERNIA REPAIR  11/3/14    BARIATRIC SURGERY  2013    [facility]    CHOLECYSTECTOMY      DILATION AND CURETTAGE OF UTERUS  2005    ENDOSCOPY, COLON, DIAGNOSTIC      EGD    FINGER AMPUTATION Left 02/09/2015    index and ring finger. from frostbite    HERNIA REPAIR      total of 8    IVC FILTER INSERTION      LIVER RESECTION      for hepatic adenoma    LYMPH NODE BIOPSY  1990    JUSTINE AND BSO  2011    TONSILLECTOMY          Medications Prior to Admission:     Prior to Admission medications    Medication Sig Start Date End Date Taking? Authorizing Provider   ondansetron (ZOFRAN ODT) 4 MG disintegrating tablet Take 1 tablet by mouth every 8 hours as needed for Nausea 2/1/22   Duane Menon DO   benazepril (LOTENSIN) 20 MG tablet TAKE 1 TABLET BY MOUTH DAILY 7/21/21   Rosas Paniagua MD   famotidine (PEPCID) 20 MG tablet TAKE 1 TABLET BY MOUTH TWICE DAILY 4/22/21   MD MISHA Saleem SOLOSTAR 300 UNIT/ML SOPN INJECT 70 UNITS INTO THE SKIN EVERY MORNING 3/30/21   Sandy Rogel MD   acyclovir (ZOVIRAX) 5 % ointment Apply topically every 3 hours x 10 days. 3/30/21   Rosas Paniagua MD   fondaparinux (ARIXTRA) 10 MG/0.8ML SOLN injection Inject 0.8 mLs into the skin daily 3/30/21   Rosas Paniagua MD   FLUoxetine (PROZAC) 20 MG capsule Take 1 capsule by mouth daily    Historical Provider, MD   cyanocobalamin 1000 MCG/ML injection Inject 1 mL into the muscle every 7 days 2/13/21   Rosas Paniagua MD   Syringe/Needle, Disp, (SYRINGE 3CC/25GX1\") 25G X 1\" 3 ML MISC To be used with B12 injections monthly 2/13/21   Rosas Paniagua MD   tiZANidine (ZANAFLEX) 4 MG tablet TAKE 1 TABLET BY MOUTH TWICE DAILY AS NEEDED FOR BACK PAIN 12/27/20   Rosas Paniagua MD   FARXIGA 10 MG tablet TAKE 1 TABLET BY MOUTH EVERY MORNING 11/2/20   Rosas Paniagua MD   pregabalin (LYRICA) 50 MG capsule Take 1 capsule by mouth 3 times daily for 7 days.  10/6/20 10/13/20  Rosas Paniagua MD   sucralfate (CARAFATE) 1 GM tablet DISSOLVE 1 TABLET INTO 15 ML( 1 TABLESPOON) OF WATER AND DRINK BY MOUTH FOUR TIMES DAILY AS NEEDED FOR GERD 7/27/20   Ernesto Myers MD   dilTIAZem (DILTIAZEM CD) 240 MG extended release capsule Take 240 mg by mouth daily Take one capsule by mouth daily. Historical Provider, MD   pravastatin (PRAVACHOL) 40 MG tablet TAKE 1 TABLET(40 MG) BY MOUTH DAILY 6/26/20   Ernesto Myers MD   valACYclovir (VALTREX) 500 MG tablet Take 1 tablet by mouth daily 6/8/20   Ernesto Myers MD   ondansetron (ZOFRAN) 4 MG tablet Take 1 tablet by mouth 3 times daily as needed for Nausea or Vomiting 4/27/20   Eli Villegas MD   acetaminophen (TYLENOL) 500 MG tablet Take 2 tablets by mouth every 8 hours as needed for Pain 2/19/20   Ayana Cutler,    LORazepam (ATIVAN) 2 MG tablet  12/18/19   Historical Provider, MD   GLUCAGEN HYPOKIT 1 MG SOLR injection  12/19/19   Historical Provider, MD   glucose monitoring kit (FREESTYLE) monitoring kit Testing twice a day. Please dispense according to patients insurance. 12/20/19   Ernesto Myers MD   Insulin Pen Needle 31G X 5 MM MISC 1 each by Does not apply route daily 12/20/19   Ernesto Myers MD   Lancets 30G MISC Testing twice a day. Please dispense according to patients insurance. 12/20/19   Ernesto Myers MD   blood glucose monitor strips Testing twice a day. Please dispense according to patients insurance. 12/20/19   Ernesto Myers MD   KLOR-CON M10 10 MEQ extended release tablet Take 2 tablets by mouth daily as needed (take with bumex) 12/20/19   Ernesto Myers MD   bumetanide (BUMEX) 0.5 MG tablet TAKE 1 TABLET BY MOUTH DAILY AS NEEDED FOR LEG SWELLING 12/20/19   Ernesto Myers MD   lamoTRIgine (LAMICTAL) 200 MG tablet Take 200 mg by mouth daily    Historical Provider, MD   amphetamine-dextroamphetamine (ADDERALL, 30MG,) 30 MG tablet Take 30 mg by mouth daily.     Historical Provider, MD   dicyclomine (BENTYL) 10 MG capsule Take 1 capsule by mouth every 6 hours as needed (cramps) 11/3/19   Viky Garrett MD   QUEtiapine (SEROQUEL) 100 MG tablet Take 50 mg by mouth nightly as needed     Historical Provider, MD   Multiple Vitamins-Minerals (THERAPEUTIC MULTIVITAMIN-MINERALS) tablet Take 1 tablet by mouth daily    Historical Provider, MD   buPROPion (WELLBUTRIN XL) 300 MG extended release tablet Take 300 mg by mouth every morning    Historical Provider, MD   vitamin D (CHOLECALCIFEROL) 1000 UNIT TABS tablet Take 10,000 Units by mouth daily 5 days a week    Historical Provider, MD        Allergies:     Asa [aspirin], Betadine [povidone iodine], Celexa [citalopram hydrobromide], Citalopram, Lasix [furosemide], Macrobid [nitrofurantoin], Norco [hydrocodone-acetaminophen], Betadine [povidone iodine], Codeine, Lithium, Paroxetine, Paxil [paroxetine hcl], Tape [adhesive tape], and Tegretol [carbamazepine]    Social History:       Tobacco:   Patient  reports that she has never smoked. She has never used smokeless tobacco.  Alcohol:     Patient  reports no history of alcohol use. Drug Use: Patient  reports no history of drug use. Family History:     Family History   Problem Relation Age of Onset    Depression Mother     High Blood Pressure Mother     High Cholesterol Mother     Diabetes Father     Heart Disease Father     Kidney Disease Father     High Blood Pressure Father     High Cholesterol Father     Cancer Maternal Uncle         of duodenum had whipple    Breast Cancer Maternal Aunt     Breast Cancer Maternal Cousin        Review of Systems:     Positive and Negative as described in HPI.     Review of Systems   Unable to perform ROS: Acuity of condition       Physical Exam:   /86   Pulse 120   Temp 97.3 °F (36.3 °C) (Oral)   Resp 20   Ht 5' 8\" (1.727 m)   Wt 280 lb (127 kg)   SpO2 92%   BMI 42.57 kg/m²   Temp (24hrs), Av.3 °F (36.3 °C), Min:97.3 °F (36.3 °C), Max:97.3 °F (36.3 °C)    No results for input(s): POCGLU in the last 72 hours. No intake or output data in the 24 hours ending 03/12/22 1102    Physical Exam  Constitutional:       General: She is not in acute distress. Appearance: Normal appearance. She is obese. HENT:      Head: Normocephalic and atraumatic. Right Ear: External ear normal.      Left Ear: External ear normal.      Nose: Nose normal.   Eyes:      Extraocular Movements: Extraocular movements intact. Cardiovascular:      Rate and Rhythm: Normal rate and regular rhythm. Pulses: Normal pulses. Heart sounds: Normal heart sounds. No murmur heard. Pulmonary:      Effort: Pulmonary effort is normal. No respiratory distress. Breath sounds: Normal breath sounds. No wheezing. Abdominal:      General: Bowel sounds are normal. There is no distension. Palpations: Abdomen is soft. Tenderness: There is no abdominal tenderness. There is no guarding or rebound. Musculoskeletal:      Right lower leg: No edema. Left lower leg: No edema. Skin:     General: Skin is warm. Coloration: Skin is jaundiced (Mild). Neurological:      General: No focal deficit present. Mental Status: She is alert and oriented to person, place, and time. Mental status is at baseline. Psychiatric:         Mood and Affect: Mood normal.         Behavior: Behavior normal.         Thought Content:  Thought content normal.         Investigations:     Laboratory Testing:  Recent Results (from the past 24 hour(s))   CBC with Auto Differential    Collection Time: 03/12/22  7:49 AM   Result Value Ref Range    WBC 8.1 3.5 - 11.0 k/uL    RBC 5.00 4.0 - 5.2 m/uL    Hemoglobin 15.5 12.0 - 16.0 g/dL    Hematocrit 50.7 (H) 36 - 46 %    .2 (H) 80 - 100 fL    MCH 30.9 26 - 34 pg    MCHC 30.5 (L) 31 - 37 g/dL    RDW 14.9 11.5 - 14.9 %    Platelets 035 843 - 507 k/uL    MPV 9.4 6.0 - 12.0 fL    Seg Neutrophils 81 (H) 36 - 66 %    Lymphocytes 5 (L) 24 - 44 %    Monocytes 4 1 - 7 % Eosinophils % 0 0 - 4 %    Basophils 0 0 - 2 %    Bands 10 0 - 10 %    Segs Absolute 6.56 1.3 - 9.1 k/uL    Absolute Lymph # 0.41 (L) 1.0 - 4.8 k/uL    Absolute Mono # 0.32 0.1 - 1.3 k/uL    Absolute Eos # 0.00 0.0 - 0.4 k/uL    Basophils Absolute 0.00 0.0 - 0.2 k/uL    Absolute Bands # 0.81 0.0 - 1.0 k/uL    Morphology MACROCYTOSIS PRESENT    Comprehensive Metabolic Panel w/ Reflex to MG    Collection Time: 03/12/22  7:49 AM   Result Value Ref Range    Glucose 1,255 (HH) 70 - 99 mg/dL    BUN 31 (H) 6 - 20 mg/dL    CREATININE 1.50 (H) 0.50 - 0.90 mg/dL    Calcium 7.4 (L) 8.6 - 10.4 mg/dL    Sodium 111 (LL) 135 - 144 mmol/L    Potassium 4.5 3.7 - 5.3 mmol/L    Chloride 74 (LL) 98 - 107 mmol/L    CO2 13 (L) 20 - 31 mmol/L    Anion Gap 24 (H) 9 - 17 mmol/L    Alkaline Phosphatase 411 (H) 35 - 104 U/L     (H) 5 - 33 U/L    AST 2,478 (H) <32 U/L    Total Bilirubin 1.75 (H) 0.3 - 1.2 mg/dL    Total Protein 5.5 (L) 6.4 - 8.3 g/dL    Albumin 2.8 (L) 3.5 - 5.2 g/dL    GFR Non- 38 (L) >60 mL/min    GFR  46 (L) >60 mL/min    GFR Comment         Lipase    Collection Time: 03/12/22  7:49 AM   Result Value Ref Range    Lipase 257 (H) 13 - 60 U/L   Troponin    Collection Time: 03/12/22  7:49 AM   Result Value Ref Range    Troponin, High Sensitivity 42 (H) 0 - 14 ng/L   Protime-INR    Collection Time: 03/12/22  7:49 AM   Result Value Ref Range    Protime 15.6 (H) 11.8 - 14.6 sec    INR 1.2    APTT    Collection Time: 03/12/22  7:49 AM   Result Value Ref Range    PTT 35.1 24.0 - 36.0 sec   Beta-Hydroxybutyrate    Collection Time: 03/12/22  7:49 AM   Result Value Ref Range    Beta-Hydroxybutyrate 0.57 (H) 0.02 - 0.27 mmol/L   Ethanol    Collection Time: 03/12/22  7:49 AM   Result Value Ref Range    Ethanol 404 (HH) <10 mg/dL    Ethanol percent 0.404 %   Blood Gas, Venous    Collection Time: 03/12/22  8:25 AM   Result Value Ref Range    pH, Bryant 7.187 (LL) 7.320 - 7.420    pCO2, Bryant 37.9 (L) 39.0 - 55.0    pO2, Bryant 33.3 30.0 - 50.0    HCO3, Venous 14.4 (L) 24.0 - 30.0 mmol/L    Negative Base Excess, Rbyant 13.9 (H) 0.0 - 2.0 mmol/L    O2 Sat, Bryant 53.5 (L) 60.0 - 85.0 %    Carboxyhemoglobin 0.2 0 - 5 %    Methemoglobin 0.7 0.0 - 1.9 %    Text for Respiratory RESULTS GIVEN TO DR BRUNNER    Lactic Acid    Collection Time: 03/12/22  8:25 AM   Result Value Ref Range    Lactic Acid 10.2 (H) 0.5 - 2.2 mmol/L   EKG 12 Lead    Collection Time: 03/12/22  8:54 AM   Result Value Ref Range    Ventricular Rate 118 BPM    Atrial Rate 118 BPM    P-R Interval 154 ms    QRS Duration 106 ms    Q-T Interval 334 ms    QTc Calculation (Bazett) 468 ms    P Axis 60 degrees    R Axis 50 degrees    T Axis 43 degrees   Urinalysis with Microscopic    Collection Time: 03/12/22  9:10 AM   Result Value Ref Range    Color, UA Yellow Yellow    Turbidity UA Clear Clear    Glucose, Ur LARGE (A) NEGATIVE    Bilirubin Urine NEGATIVE NEGATIVE    Ketones, Urine NEGATIVE NEGATIVE    Specific [place], UA 1.030 1.000 - 1.030    Urine Hgb SMALL (A) NEGATIVE    pH, UA 5.0 5.0 - 8.0    Protein, UA TRACE (A) NEGATIVE    Urobilinogen, Urine Normal Normal    Nitrite, Urine NEGATIVE NEGATIVE    Leukocyte Esterase, Urine NEGATIVE NEGATIVE    WBC, UA 6 TO 9 /HPF    RBC, UA 0 TO 2 /HPF    Casts UA 3 to 5 /LPF    Epithelial Cells UA 3 to 5 /HPF    Bacteria, UA FEW (A) None    Amorphous, UA 1+ (A) None    Yeast, UA FEW (A) None       Imaging/Diagnostics:  CT Head WO Contrast    Result Date: 3/12/2022  No acute intracranial abnormality. XR CHEST PORTABLE    Result Date: 3/12/2022  Low lung volumes. Diffuse interstitial opacities in the perihilar areas with left perihilar and lower lobe alveolar type opacities consistent with multifocal airspace disease.         Assessment :      Primary Problem  DKA, type 2, not at goal [facility])    Active Hospital Problems    Diagnosis Date Noted    DKA, type 2, not at goal [facility]) [E11.10] 03/12/2022       Plan:     Patient status Admit as inpatient in the  Medical ICU    Diabetic Ketoacidosis, 2/2 poor insulin compliance  -NPO effective immediately, initiate DKA protocol  -BMP q4h, initial Mag and Phos levels  -Glucose checks every hour  -Urinalysis reviewed and urine ketones: negative  -Betahydroxybutarate levels: negative  -IV Normal Saline at 250 mL/hr  -Insulin Regular started at 0.1 Units/kg/hr  -D5 and 0.45% NS at 150 mL/hr when blood glucose less than 250 mg/dL  -Will monitor bicarb and anion gap, bridge with home lantus dose and begin diet PO when bicarb >15 and gap closed x 2 measurements  -Discontinue Insulin drip 2 hours post PO intake. -Hypoglycemia, phos and potassium replacement protocols in place    Holding home medications as patient is n.p.o.    DVT prophylaxis: Lovenox 40 mg subcutaneous daily  GI prophylaxis: None indicated  Disposition: Likely home  Code: Prior   Diet: No diet orders on file   Consults: IP CONSULT TO INTERNAL MEDICINE  IP CONSULT TO PULMONOLOGY  IP CONSULT TO SOCIAL WORK   PT/OT    Saturnino Dye MD  3/12/2022  11:02 AM     Copy sent to Dr. Shalonda Vallejo    Attending Physician Statement  I have discussed the care of Kennedi Gaspar with the resident team. I have examined the patient myself and taken ros and hpi , including pertinent history and exam findings,  with the resident. I have reviewed the key elements of all parts of the encounter with the resident. I agree with the assessment, plan and orders as documented by the resident. Principal Problem:    DKA, type 2, not at goal [facility])  Resolved Problems:    * No resolved hospital problems.  *    DKA, alcohol abuse and withdrawal -inslulin gtt, fluids, CIWA protocol  Pt seen in ICU on 3/12 at 6:50pm        Electronically signed by Emre iRce MD

## 2022-03-12 NOTE — ED NOTES
Mode of arrival (squad #, walk in, police, etc) : EMS        Chief complaint(s): Alcohol Intoxication        Arrival Note (brief scenario, treatment PTA, etc). : patient arrived to ED via EMS from home with C/O Alcohol intoxication. Patient is alert to person, responds to voice and following commands, however is confused. Patient is slurring her words. Patient is in no distress. VS obtained and documented. Denies any CP, SOB, dizziness, or any pain. Dr. Easton Clayton at bedside, blood work obtained and sent to lab. EMS reported BS in the 400's. C= \"Have you ever felt that you should Cut down on your drinking? \"  No  A= \"Have people Annoyed you by criticizing your drinking? \"  No  G= \"Have you ever felt bad or Guilty about your drinking? \"  No  E= \"Have you ever had a drink as an Eye-opener first thing in the morning to steady your nerves or to help a hangover? \"  No      Deferred []      Reason for deferring: N/A    *If yes to two or more: probable alcohol abuse. Scarlet Allen, RN  03/12/22 1 Daphne Stokes RN  03/12/22 [id number]

## 2022-03-12 NOTE — LETTER
[address]  Phone: [phone]    No name on file. March 19, 2022     Patient: Diana Reyna   YOB: 1980   Date of Visit: 3/12/2022       To Whom it May Concern:    Stefan Rahman was seen in our facility starting on 3/12/2022 through 3/19/2022. If you have any questions or concerns, please don't hesitate to call.     Sincerely,         _____________________________________

## 2022-03-13 ENCOUNTER — APPOINTMENT (OUTPATIENT)
Dept: GENERAL RADIOLOGY | Age: 42
DRG: 871 | End: 2022-03-13
Payer: MEDICARE

## 2022-03-13 ENCOUNTER — APPOINTMENT (OUTPATIENT)
Dept: CT IMAGING | Age: 42
DRG: 871 | End: 2022-03-13
Payer: MEDICARE

## 2022-03-13 PROBLEM — N17.9 AKI (ACUTE KIDNEY INJURY) (HCC): Status: ACTIVE | Noted: 2022-03-13

## 2022-03-13 LAB
ALBUMIN SERPL-MCNC: 2.1 G/DL (ref 3.5–5.2)
ALP BLD-CCNC: 383 U/L (ref 35–104)
ALT SERPL-CCNC: 407 U/L (ref 5–33)
AMYLASE: 727 U/L (ref 28–100)
ANION GAP SERPL CALCULATED.3IONS-SCNC: 11 MMOL/L (ref 9–17)
ANION GAP SERPL CALCULATED.3IONS-SCNC: 12 MMOL/L (ref 9–17)
ANION GAP SERPL CALCULATED.3IONS-SCNC: 12 MMOL/L (ref 9–17)
ANION GAP SERPL CALCULATED.3IONS-SCNC: 13 MMOL/L (ref 9–17)
ANION GAP SERPL CALCULATED.3IONS-SCNC: 14 MMOL/L (ref 9–17)
AST SERPL-CCNC: 501 U/L
BILIRUB SERPL-MCNC: 2.8 MG/DL (ref 0.3–1.2)
BILIRUBIN DIRECT: 2.43 MG/DL
BILIRUBIN, INDIRECT: 0.37 MG/DL (ref 0–1)
BUN BLDV-MCNC: 25 MG/DL (ref 6–20)
BUN BLDV-MCNC: 27 MG/DL (ref 6–20)
BUN BLDV-MCNC: 31 MG/DL (ref 6–20)
BUN BLDV-MCNC: 34 MG/DL (ref 6–20)
BUN BLDV-MCNC: 36 MG/DL (ref 6–20)
C DIFF AG + TOXIN: NEGATIVE
CALCIUM SERPL-MCNC: 6.5 MG/DL (ref 8.6–10.4)
CALCIUM SERPL-MCNC: 6.6 MG/DL (ref 8.6–10.4)
CALCIUM SERPL-MCNC: 6.6 MG/DL (ref 8.6–10.4)
CALCIUM SERPL-MCNC: 6.7 MG/DL (ref 8.6–10.4)
CALCIUM SERPL-MCNC: 7 MG/DL (ref 8.6–10.4)
CHLORIDE BLD-SCNC: 103 MMOL/L (ref 98–107)
CHLORIDE BLD-SCNC: 105 MMOL/L (ref 98–107)
CHLORIDE BLD-SCNC: 107 MMOL/L (ref 98–107)
CHLORIDE BLD-SCNC: 107 MMOL/L (ref 98–107)
CHLORIDE BLD-SCNC: 109 MMOL/L (ref 98–107)
CO2: 15 MMOL/L (ref 20–31)
CO2: 16 MMOL/L (ref 20–31)
CO2: 17 MMOL/L (ref 20–31)
CREAT SERPL-MCNC: 0.89 MG/DL (ref 0.5–0.9)
CREAT SERPL-MCNC: 1.02 MG/DL (ref 0.5–0.9)
CREAT SERPL-MCNC: 1.03 MG/DL (ref 0.5–0.9)
CREAT SERPL-MCNC: 1.05 MG/DL (ref 0.5–0.9)
CREAT SERPL-MCNC: 1.16 MG/DL (ref 0.5–0.9)
GFR AFRICAN AMERICAN: >60 ML/MIN
GFR NON-AFRICAN AMERICAN: 51 ML/MIN
GFR NON-AFRICAN AMERICAN: 57 ML/MIN
GFR NON-AFRICAN AMERICAN: 59 ML/MIN
GFR NON-AFRICAN AMERICAN: 59 ML/MIN
GFR NON-AFRICAN AMERICAN: >60 ML/MIN
GFR SERPL CREATININE-BSD FRML MDRD: ABNORMAL ML/MIN/{1.73_M2}
GLUCOSE BLD-MCNC: 107 MG/DL (ref 65–105)
GLUCOSE BLD-MCNC: 116 MG/DL (ref 65–105)
GLUCOSE BLD-MCNC: 119 MG/DL (ref 65–105)
GLUCOSE BLD-MCNC: 158 MG/DL (ref 65–105)
GLUCOSE BLD-MCNC: 184 MG/DL (ref 65–105)
GLUCOSE BLD-MCNC: 192 MG/DL (ref 70–99)
GLUCOSE BLD-MCNC: 200 MG/DL (ref 65–105)
GLUCOSE BLD-MCNC: 203 MG/DL (ref 65–105)
GLUCOSE BLD-MCNC: 209 MG/DL (ref 65–105)
GLUCOSE BLD-MCNC: 218 MG/DL (ref 70–99)
GLUCOSE BLD-MCNC: 235 MG/DL (ref 70–99)
GLUCOSE BLD-MCNC: 243 MG/DL (ref 65–105)
GLUCOSE BLD-MCNC: 251 MG/DL (ref 65–105)
GLUCOSE BLD-MCNC: 267 MG/DL (ref 65–105)
GLUCOSE BLD-MCNC: 278 MG/DL (ref 65–105)
GLUCOSE BLD-MCNC: 279 MG/DL (ref 65–105)
GLUCOSE BLD-MCNC: 287 MG/DL (ref 65–105)
GLUCOSE BLD-MCNC: 289 MG/DL (ref 65–105)
GLUCOSE BLD-MCNC: 304 MG/DL (ref 70–99)
GLUCOSE BLD-MCNC: 338 MG/DL (ref 65–105)
GLUCOSE BLD-MCNC: 488 MG/DL (ref 65–105)
GLUCOSE BLD-MCNC: 564 MG/DL (ref 65–105)
GLUCOSE BLD-MCNC: 91 MG/DL (ref 65–105)
GLUCOSE BLD-MCNC: 96 MG/DL (ref 70–99)
HAV IGM SER IA-ACNC: NONREACTIVE
HEPATITIS B CORE IGM ANTIBODY: NONREACTIVE
HEPATITIS B SURFACE ANTIGEN: NONREACTIVE
HEPATITIS C ANTIBODY: NONREACTIVE
INR BLD: 1.2
LACTIC ACID: 2.8 MMOL/L (ref 0.5–2.2)
LIPASE: 69 U/L (ref 13–60)
MAGNESIUM: 1.8 MG/DL (ref 1.6–2.6)
MAGNESIUM: 1.8 MG/DL (ref 1.6–2.6)
MAGNESIUM: 2 MG/DL (ref 1.6–2.6)
MAGNESIUM: 2.2 MG/DL (ref 1.6–2.6)
MAGNESIUM: 2.2 MG/DL (ref 1.6–2.6)
MICRO OVA & PARASITES: NORMAL
PHOSPHORUS: 0.9 MG/DL (ref 2.6–4.5)
PHOSPHORUS: 1.5 MG/DL (ref 2.6–4.5)
PHOSPHORUS: 1.6 MG/DL (ref 2.6–4.5)
PHOSPHORUS: 2.1 MG/DL (ref 2.6–4.5)
PHOSPHORUS: 2.8 MG/DL (ref 2.6–4.5)
POTASSIUM SERPL-SCNC: 3.8 MMOL/L (ref 3.7–5.3)
POTASSIUM SERPL-SCNC: 4.2 MMOL/L (ref 3.7–5.3)
POTASSIUM SERPL-SCNC: 4.7 MMOL/L (ref 3.7–5.3)
POTASSIUM SERPL-SCNC: 4.8 MMOL/L (ref 3.7–5.3)
POTASSIUM SERPL-SCNC: 4.9 MMOL/L (ref 3.7–5.3)
PROTHROMBIN TIME: 14.8 SEC (ref 11.8–14.6)
SODIUM BLD-SCNC: 132 MMOL/L (ref 135–144)
SODIUM BLD-SCNC: 133 MMOL/L (ref 135–144)
SODIUM BLD-SCNC: 134 MMOL/L (ref 135–144)
SODIUM BLD-SCNC: 135 MMOL/L (ref 135–144)
SODIUM BLD-SCNC: 137 MMOL/L (ref 135–144)
SPECIMEN DESCRIPTION: NORMAL
SPECIMEN DESCRIPTION: NORMAL
TOTAL PROTEIN: 4.2 G/DL (ref 6.4–8.3)

## 2022-03-13 PROCEDURE — 84100 ASSAY OF PHOSPHORUS: CPT

## 2022-03-13 PROCEDURE — 74177 CT ABD & PELVIS W/CONTRAST: CPT

## 2022-03-13 PROCEDURE — 93005 ELECTROCARDIOGRAM TRACING: CPT | Performed by: INTERNAL MEDICINE

## 2022-03-13 PROCEDURE — 87328 CRYPTOSPORIDIUM AG IA: CPT

## 2022-03-13 PROCEDURE — 87209 SMEAR COMPLEX STAIN: CPT

## 2022-03-13 PROCEDURE — 80076 HEPATIC FUNCTION PANEL: CPT

## 2022-03-13 PROCEDURE — 2500000003 HC RX 250 WO HCPCS: Performed by: STUDENT IN AN ORGANIZED HEALTH CARE EDUCATION/TRAINING PROGRAM

## 2022-03-13 PROCEDURE — 2000000000 HC ICU R&B

## 2022-03-13 PROCEDURE — 87329 GIARDIA AG IA: CPT

## 2022-03-13 PROCEDURE — 2580000003 HC RX 258: Performed by: EMERGENCY MEDICINE

## 2022-03-13 PROCEDURE — 2500000003 HC RX 250 WO HCPCS: Performed by: INTERNAL MEDICINE

## 2022-03-13 PROCEDURE — 87086 URINE CULTURE/COLONY COUNT: CPT

## 2022-03-13 PROCEDURE — 80074 ACUTE HEPATITIS PANEL: CPT

## 2022-03-13 PROCEDURE — 82947 ASSAY GLUCOSE BLOOD QUANT: CPT

## 2022-03-13 PROCEDURE — 83605 ASSAY OF LACTIC ACID: CPT

## 2022-03-13 PROCEDURE — 87040 BLOOD CULTURE FOR BACTERIA: CPT

## 2022-03-13 PROCEDURE — 87077 CULTURE AEROBIC IDENTIFY: CPT

## 2022-03-13 PROCEDURE — 6360000002 HC RX W HCPCS: Performed by: INTERNAL MEDICINE

## 2022-03-13 PROCEDURE — 94761 N-INVAS EAR/PLS OXIMETRY MLT: CPT

## 2022-03-13 PROCEDURE — 83690 ASSAY OF LIPASE: CPT

## 2022-03-13 PROCEDURE — 87015 SPECIMEN INFECT AGNT CONCNTJ: CPT

## 2022-03-13 PROCEDURE — 87186 SC STD MICRODIL/AGAR DIL: CPT

## 2022-03-13 PROCEDURE — 82150 ASSAY OF AMYLASE: CPT

## 2022-03-13 PROCEDURE — 6360000002 HC RX W HCPCS: Performed by: STUDENT IN AN ORGANIZED HEALTH CARE EDUCATION/TRAINING PROGRAM

## 2022-03-13 PROCEDURE — 80048 BASIC METABOLIC PNL TOTAL CA: CPT

## 2022-03-13 PROCEDURE — 83735 ASSAY OF MAGNESIUM: CPT

## 2022-03-13 PROCEDURE — 99233 SBSQ HOSP IP/OBS HIGH 50: CPT | Performed by: INTERNAL MEDICINE

## 2022-03-13 PROCEDURE — 2580000003 HC RX 258: Performed by: INTERNAL MEDICINE

## 2022-03-13 PROCEDURE — 2580000003 HC RX 258: Performed by: STUDENT IN AN ORGANIZED HEALTH CARE EDUCATION/TRAINING PROGRAM

## 2022-03-13 PROCEDURE — 6370000000 HC RX 637 (ALT 250 FOR IP): Performed by: EMERGENCY MEDICINE

## 2022-03-13 PROCEDURE — 87210 SMEAR WET MOUNT SALINE/INK: CPT

## 2022-03-13 PROCEDURE — 36415 COLL VENOUS BLD VENIPUNCTURE: CPT

## 2022-03-13 PROCEDURE — 87449 NOS EACH ORGANISM AG IA: CPT

## 2022-03-13 PROCEDURE — 87506 IADNA-DNA/RNA PROBE TQ 6-11: CPT

## 2022-03-13 PROCEDURE — 6370000000 HC RX 637 (ALT 250 FOR IP): Performed by: STUDENT IN AN ORGANIZED HEALTH CARE EDUCATION/TRAINING PROGRAM

## 2022-03-13 PROCEDURE — 71045 X-RAY EXAM CHEST 1 VIEW: CPT

## 2022-03-13 PROCEDURE — 6360000002 HC RX W HCPCS: Performed by: EMERGENCY MEDICINE

## 2022-03-13 PROCEDURE — 87324 CLOSTRIDIUM AG IA: CPT

## 2022-03-13 PROCEDURE — 85610 PROTHROMBIN TIME: CPT

## 2022-03-13 PROCEDURE — 6370000000 HC RX 637 (ALT 250 FOR IP)

## 2022-03-13 PROCEDURE — 2500000003 HC RX 250 WO HCPCS

## 2022-03-13 PROCEDURE — 93005 ELECTROCARDIOGRAM TRACING: CPT | Performed by: STUDENT IN AN ORGANIZED HEALTH CARE EDUCATION/TRAINING PROGRAM

## 2022-03-13 RX ORDER — METOPROLOL TARTRATE 5 MG/5ML
2.5 INJECTION INTRAVENOUS
Status: COMPLETED | OUTPATIENT
Start: 2022-03-13 | End: 2022-03-13

## 2022-03-13 RX ORDER — METOPROLOL TARTRATE 5 MG/5ML
5 INJECTION INTRAVENOUS ONCE
Status: COMPLETED | OUTPATIENT
Start: 2022-03-13 | End: 2022-03-13

## 2022-03-13 RX ORDER — FLUOXETINE HYDROCHLORIDE 20 MG/1
20 CAPSULE ORAL DAILY
Status: DISCONTINUED | OUTPATIENT
Start: 2022-03-13 | End: 2022-03-19 | Stop reason: HOSPADM

## 2022-03-13 RX ORDER — LAMOTRIGINE 100 MG/1
200 TABLET ORAL DAILY
Status: DISCONTINUED | OUTPATIENT
Start: 2022-03-13 | End: 2022-03-19 | Stop reason: HOSPADM

## 2022-03-13 RX ORDER — LISINOPRIL 20 MG/1
20 TABLET ORAL DAILY
Refills: 0 | Status: DISCONTINUED | OUTPATIENT
Start: 2022-03-13 | End: 2022-03-16

## 2022-03-13 RX ORDER — 0.9 % SODIUM CHLORIDE 0.9 %
1000 INTRAVENOUS SOLUTION INTRAVENOUS ONCE
Status: COMPLETED | OUTPATIENT
Start: 2022-03-13 | End: 2022-03-13

## 2022-03-13 RX ORDER — PRAVASTATIN SODIUM 40 MG
40 TABLET ORAL DAILY
Status: DISCONTINUED | OUTPATIENT
Start: 2022-03-13 | End: 2022-03-16

## 2022-03-13 RX ORDER — SODIUM CHLORIDE 0.9 % (FLUSH) 0.9 %
10 SYRINGE (ML) INJECTION PRN
Status: DISCONTINUED | OUTPATIENT
Start: 2022-03-13 | End: 2022-03-19 | Stop reason: HOSPADM

## 2022-03-13 RX ORDER — PREGABALIN 25 MG/1
50 CAPSULE ORAL 3 TIMES DAILY
Status: DISCONTINUED | OUTPATIENT
Start: 2022-03-13 | End: 2022-03-16

## 2022-03-13 RX ORDER — BUPROPION HYDROCHLORIDE 300 MG/1
300 TABLET ORAL EVERY MORNING
Status: DISCONTINUED | OUTPATIENT
Start: 2022-03-14 | End: 2022-03-19 | Stop reason: HOSPADM

## 2022-03-13 RX ORDER — DILTIAZEM HYDROCHLORIDE 240 MG/1
240 CAPSULE, COATED, EXTENDED RELEASE ORAL DAILY
Status: DISCONTINUED | OUTPATIENT
Start: 2022-03-13 | End: 2022-03-15

## 2022-03-13 RX ORDER — METOPROLOL TARTRATE 5 MG/5ML
5 INJECTION INTRAVENOUS EVERY 6 HOURS PRN
Status: DISCONTINUED | OUTPATIENT
Start: 2022-03-13 | End: 2022-03-19 | Stop reason: HOSPADM

## 2022-03-13 RX ORDER — 0.9 % SODIUM CHLORIDE 0.9 %
80 INTRAVENOUS SOLUTION INTRAVENOUS ONCE
Status: COMPLETED | OUTPATIENT
Start: 2022-03-14 | End: 2022-03-14

## 2022-03-13 RX ORDER — INSULIN GLARGINE 100 [IU]/ML
20 INJECTION, SOLUTION SUBCUTANEOUS NIGHTLY
Status: DISCONTINUED | OUTPATIENT
Start: 2022-03-13 | End: 2022-03-14

## 2022-03-13 RX ORDER — SODIUM CHLORIDE, SODIUM LACTATE, POTASSIUM CHLORIDE, AND CALCIUM CHLORIDE .6; .31; .03; .02 G/100ML; G/100ML; G/100ML; G/100ML
1000 INJECTION, SOLUTION INTRAVENOUS ONCE
Status: COMPLETED | OUTPATIENT
Start: 2022-03-13 | End: 2022-03-13

## 2022-03-13 RX ORDER — THIAMINE HYDROCHLORIDE 100 MG/ML
50 INJECTION, SOLUTION INTRAMUSCULAR; INTRAVENOUS EVERY 8 HOURS
Status: COMPLETED | OUTPATIENT
Start: 2022-03-13 | End: 2022-03-15

## 2022-03-13 RX ADMIN — POTASSIUM CHLORIDE 10 MEQ: 10 INJECTION, SOLUTION INTRAVENOUS at 23:31

## 2022-03-13 RX ADMIN — PRAVASTATIN SODIUM 40 MG: 40 TABLET ORAL at 17:15

## 2022-03-13 RX ADMIN — POTASSIUM CHLORIDE 10 MEQ: 10 INJECTION, SOLUTION INTRAVENOUS at 11:10

## 2022-03-13 RX ADMIN — DEXTROSE AND SODIUM CHLORIDE: 5; 450 INJECTION, SOLUTION INTRAVENOUS at 11:11

## 2022-03-13 RX ADMIN — POTASSIUM CHLORIDE AND SODIUM CHLORIDE: 900; 150 INJECTION, SOLUTION INTRAVENOUS at 06:12

## 2022-03-13 RX ADMIN — CEFTRIAXONE SODIUM 1000 MG: 1 INJECTION, POWDER, FOR SOLUTION INTRAMUSCULAR; INTRAVENOUS at 10:26

## 2022-03-13 RX ADMIN — FAMOTIDINE 20 MG: 10 INJECTION, SOLUTION INTRAVENOUS at 07:51

## 2022-03-13 RX ADMIN — POTASSIUM CHLORIDE 10 MEQ: 10 INJECTION, SOLUTION INTRAVENOUS at 01:12

## 2022-03-13 RX ADMIN — LAMOTRIGINE 200 MG: 100 TABLET ORAL at 17:15

## 2022-03-13 RX ADMIN — ANTI-FUNGAL POWDER MICONAZOLE NITRATE TALC FREE: 1.42 POWDER TOPICAL at 10:17

## 2022-03-13 RX ADMIN — VANCOMYCIN HYDROCHLORIDE 2500 MG: 1.5 INJECTION, POWDER, LYOPHILIZED, FOR SOLUTION INTRAVENOUS at 20:00

## 2022-03-13 RX ADMIN — PREGABALIN 50 MG: 25 CAPSULE ORAL at 17:15

## 2022-03-13 RX ADMIN — ONDANSETRON 4 MG: 2 INJECTION INTRAMUSCULAR; INTRAVENOUS at 18:01

## 2022-03-13 RX ADMIN — FLUOXETINE HYDROCHLORIDE 20 MG: 20 CAPSULE ORAL at 17:15

## 2022-03-13 RX ADMIN — ACETAMINOPHEN 650 MG: 650 SUPPOSITORY RECTAL at 13:49

## 2022-03-13 RX ADMIN — FAMOTIDINE 20 MG: 10 INJECTION, SOLUTION INTRAVENOUS at 23:08

## 2022-03-13 RX ADMIN — ACETAMINOPHEN 650 MG: 650 SUPPOSITORY RECTAL at 22:31

## 2022-03-13 RX ADMIN — LISINOPRIL 20 MG: 20 TABLET ORAL at 17:15

## 2022-03-13 RX ADMIN — ENOXAPARIN SODIUM 30 MG: 100 INJECTION SUBCUTANEOUS at 07:51

## 2022-03-13 RX ADMIN — THIAMINE HYDROCHLORIDE 50 MG: 100 INJECTION, SOLUTION INTRAMUSCULAR; INTRAVENOUS at 16:43

## 2022-03-13 RX ADMIN — SODIUM CHLORIDE 3.1 UNITS/HR: 9 INJECTION, SOLUTION INTRAVENOUS at 05:50

## 2022-03-13 RX ADMIN — METOPROLOL TARTRATE 2.5 MG: 1 INJECTION, SOLUTION INTRAVENOUS at 07:32

## 2022-03-13 RX ADMIN — THIAMINE HYDROCHLORIDE 50 MG: 100 INJECTION, SOLUTION INTRAMUSCULAR; INTRAVENOUS at 07:13

## 2022-03-13 RX ADMIN — SODIUM CHLORIDE 1000 ML: 9 INJECTION, SOLUTION INTRAVENOUS at 03:53

## 2022-03-13 RX ADMIN — ONDANSETRON 4 MG: 2 INJECTION INTRAMUSCULAR; INTRAVENOUS at 09:55

## 2022-03-13 RX ADMIN — ENOXAPARIN SODIUM 30 MG: 100 INJECTION SUBCUTANEOUS at 23:08

## 2022-03-13 RX ADMIN — DEXTROSE AND SODIUM CHLORIDE: 5; 450 INJECTION, SOLUTION INTRAVENOUS at 17:50

## 2022-03-13 RX ADMIN — ANTI-FUNGAL POWDER MICONAZOLE NITRATE TALC FREE: 1.42 POWDER TOPICAL at 23:09

## 2022-03-13 RX ADMIN — POTASSIUM CHLORIDE 10 MEQ: 10 INJECTION, SOLUTION INTRAVENOUS at 05:45

## 2022-03-13 RX ADMIN — CEFEPIME HYDROCHLORIDE 2000 MG: 2 INJECTION, POWDER, FOR SOLUTION INTRAVENOUS at 17:48

## 2022-03-13 RX ADMIN — SODIUM PHOSPHATE, MONOBASIC, MONOHYDRATE AND SODIUM PHOSPHATE, DIBASIC, ANHYDROUS 20 MMOL: 276; 142 INJECTION, SOLUTION INTRAVENOUS at 07:30

## 2022-03-13 RX ADMIN — POTASSIUM CHLORIDE 10 MEQ: 10 INJECTION, SOLUTION INTRAVENOUS at 16:50

## 2022-03-13 RX ADMIN — METOPROLOL TARTRATE 5 MG: 1 INJECTION, SOLUTION INTRAVENOUS at 21:27

## 2022-03-13 RX ADMIN — DILTIAZEM HYDROCHLORIDE 240 MG: 240 CAPSULE, COATED, EXTENDED RELEASE ORAL at 17:15

## 2022-03-13 RX ADMIN — POTASSIUM CHLORIDE 10 MEQ: 10 INJECTION, SOLUTION INTRAVENOUS at 06:57

## 2022-03-13 RX ADMIN — POTASSIUM CHLORIDE 10 MEQ: 10 INJECTION, SOLUTION INTRAVENOUS at 15:31

## 2022-03-13 RX ADMIN — METOPROLOL TARTRATE 5 MG: 1 INJECTION, SOLUTION INTRAVENOUS at 19:25

## 2022-03-13 RX ADMIN — METOPROLOL TARTRATE 5 MG: 1 INJECTION, SOLUTION INTRAVENOUS at 15:19

## 2022-03-13 RX ADMIN — POTASSIUM CHLORIDE 10 MEQ: 10 INJECTION, SOLUTION INTRAVENOUS at 00:10

## 2022-03-13 RX ADMIN — POTASSIUM CHLORIDE 10 MEQ: 10 INJECTION, SOLUTION INTRAVENOUS at 12:14

## 2022-03-13 RX ADMIN — SODIUM CHLORIDE 1000 ML: 9 INJECTION, SOLUTION INTRAVENOUS at 06:26

## 2022-03-13 RX ADMIN — SODIUM CHLORIDE, POTASSIUM CHLORIDE, SODIUM LACTATE AND CALCIUM CHLORIDE 1000 ML: 600; 310; 30; 20 INJECTION, SOLUTION INTRAVENOUS at 21:26

## 2022-03-13 RX ADMIN — ACETAMINOPHEN 650 MG: 650 SUPPOSITORY RECTAL at 08:33

## 2022-03-13 ASSESSMENT — ENCOUNTER SYMPTOMS
SORE THROAT: 0
ABDOMINAL PAIN: 0
BACK PAIN: 0
COLOR CHANGE: 0
CHEST TIGHTNESS: 0
SHORTNESS OF BREATH: 0
EYE REDNESS: 0
NAUSEA: 0
WHEEZING: 0
VOMITING: 0

## 2022-03-13 ASSESSMENT — PAIN SCALES - GENERAL
PAINLEVEL_OUTOF10: 0

## 2022-03-13 NOTE — PROGRESS NOTES
Patients heart rate continues to be elevated in the 140-150s. Writer notified Dr. Gopi Portillo. Order received for 1L bolus of NS. Bolus given with no improvement in heart rate. Blood pressure improved, Dr. Gopi Portillo notified.

## 2022-03-13 NOTE — CARE COORDINATION
CASE MANAGEMENT NOTE:    Admission Date:  3/12/2022 [place] is a 42 y.o.  female    Admitted for : DKA, type 2, not at goal [facility]) [E11.10]    Met with:  Patient    PCP:  Elana Oquendo                                Insurance:  Medicare      Is patient alert and oriented at time of discussion:  Yes    Current Residence/ Living Arrangements:  independently at home w/ , Pradip Mary PTA:  No    Does patient go to outpatient dialysis: No  If yes, location and chair time: NA    Is patient agreeable to VNS: No    Freedom of choice provided:  No    List of [address] provided: No    VNS chosen:  No    DME:  Glucometer/Supplies    Home Oxygen: No    Nebulizer: No    CPAP/BIPAP: No    Supplier: N/A    Potential Assistance Needed: Will follow    SNF needed: No    Freedom of choice and list provided: NA    Pharmacy:  [facility]       Does Patient want to use MEDS to BEDS? No    Is patient currently receiving oral anticoagulation therapy? No    Is the Patient an [facility] with Readmission Risk Score greater than 14%? Yes  If yes, pt needs a follow up appointment made within 7 days. Family Members/Caregivers that pt would like involved in their care:    Yes    If yes, list name here:  Madison Shelton, Mom, Tracy Dhillon    Transportation Provider:               Discharge Plan:  3/13/22 Medicare Pt. Lives in [facility] w/ . DME- glucometer/supplies. Denies VNS. IV Cefepime/Vanco. Insulin GTT. Orange header 19% Denies needs, will follow//KB                Electronically signed by: Zhao Collado RN on 3/13/2022 at 5:53 PM

## 2022-03-13 NOTE — PROGRESS NOTES
patient to the ICU with consults in place for pulmonology critical care. Review of Systems:     Review of Systems   Constitutional: Negative for appetite change, chills, fatigue and fever. HENT: Negative for congestion and sore throat. Eyes: Negative for redness. Respiratory: Negative for chest tightness, shortness of breath and wheezing. Cardiovascular: Negative for chest pain, palpitations and leg swelling. Gastrointestinal: Negative for abdominal pain, nausea and vomiting. Genitourinary: Negative for dysuria and hematuria. Musculoskeletal: Negative for back pain. Skin: Negative for color change. Neurological: Negative for dizziness, weakness, light-headedness and headaches. Psychiatric/Behavioral: Negative for confusion and decreased concentration. Medications:      Allergies:  Asa [aspirin], Betadine [povidone iodine], Celexa [citalopram hydrobromide], Citalopram, Lasix [furosemide], Macrobid [nitrofurantoin], Norco [hydrocodone-acetaminophen], Betadine [povidone iodine], Codeine, Lithium, Paroxetine, Paxil [paroxetine hcl], Tape [adhesive tape], and Tegretol [carbamazepine]    Current Meds:   Scheduled Meds:    thiamine  50 mg IntraVENous Q8H    cefTRIAXone (ROCEPHIN) IV  1,000 mg IntraVENous Q24H    miconazole   Topical BID    sodium chloride flush  5-40 mL IntraVENous 2 times per day    enoxaparin  30 mg SubCUTAneous BID    famotidine (PEPCID) injection  20 mg IntraVENous BID    sodium chloride flush  5-40 mL IntraVENous 2 times per day     Continuous Infusions:    dextrose      insulin 5.72 Units/hr (03/13/22 1001)    0.9% NaCl with KCl 20 mEq 200 mL/hr at 03/13/22 0612    sodium chloride      dextrose 5 % and 0.45 % NaCl      sodium chloride       PRN Meds: glucose, dextrose, glucagon (rDNA), dextrose, potassium chloride, magnesium sulfate, sodium phosphate IVPB **OR** sodium phosphate IVPB **OR** sodium phosphate IVPB, polyethylene glycol, dextrose 5 % and 0.45 % NaCl, sodium chloride flush, sodium chloride, ondansetron **OR** ondansetron, acetaminophen **OR** acetaminophen, LORazepam **OR** LORazepam **OR** LORazepam **OR** LORazepam **OR** LORazepam **OR** LORazepam **OR** LORazepam **OR** LORazepam    Data:     Past Medical History:    Past Medical History:   Diagnosis Date    Alcohol abuse     sober since5    Anxiety     Arthritis     Painting esophagus     Benzodiazepine overdose 9/26/2015    Bipolar disorder, unspecified ([address]     Bipolar I disorder, most recent episode depressed, severe without psychotic features ([address]     Cellulitis 11/17/2018    Chronic abdominal wound infection 9/27/2015    Constipation 9/3/2019    Depression 7/12/2015    Drug overdose, intentional ([address] 7/12/2015    Genital herpes, unspecified     GERD (gastroesophageal reflux disease)     History of pulmonary embolism     Hx of blood clots dx 2 years ago    clots in both legs and lungs     Hypertension     Iron deficiency anemia     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    Lumbosacral spondylosis without myelopathy     MDRO (multiple drug resistant organisms) resistance 2010    MRSA (abd)    Miscarriage     multiple, around 7th mos pregnant each time d/t hypercoagulation    Morbid obesity ([address]     MRSA (methicillin resistant staph aureus) culture positive 02/10/2017    urine    Overdose of benzodiazepine     Pernicious anemia     Primary hypercoagulable state ([address]     antiphospholipid antibodies on Arixtra shots daily    Pulmonary embolism ([address] 2010    Suicidal behavior 12/14/2015    Suicidal ideation     Suicide attempt ([address] 2014    hx OD on painkillers and rubbing alcohol    SVT (supraventricular tachycardia) ([address] 04/07/2017    Toxic effect of ethanol, intentional self-harm ([address] 12/16/2015    Type 2 diabetes mellitus, with long-term current use of insulin ([address]        Social History:    Tobacco:   Patient  reports that she has never smoked.  She has never used smokeless tobacco.  Alcohol:     Patient  reports no history of alcohol use. Drug Use: Patient  reports no history of drug use. Family History:   Family History   Problem Relation Age of Onset    Depression Mother     High Blood Pressure Mother     High Cholesterol Mother     Diabetes Father     Heart Disease Father     Kidney Disease Father     High Blood Pressure Father     High Cholesterol Father     Cancer Maternal Uncle         of duodenum had whipple    Breast Cancer Maternal Aunt     Breast Cancer Maternal Cousin        Vitals:  BP (!) 125/47   Pulse 143   Temp 101.5 °F (38.6 °C)   Resp (!) 38   Ht 5' 8\" (1.727 m)   Wt 280 lb (127 kg)   SpO2 94%   BMI 42.57 kg/m²   Temp (24hrs), Av.9 °F (37.2 °C), Min:97.2 °F (36.2 °C), Max:101.5 °F (38.6 °C)    Vitals:    22 0815 22 0830 22 0845 22 1000   BP: 107/66   (!) 125/47   Pulse: 145 141 145 143   Resp: 30 (!) 34 (!) 32 (!) 38   Temp:       TempSrc:       SpO2: 96% 96% 94% 94%   Weight:       Height:           O2 Requirements: On room air    Lines/Drains/Access: Peripheral IV, urethral catheter    I/O(24Hr): Intake/Output Summary (Last 24 hours) at 3/13/2022 1053  Last data filed at 3/13/2022 0700  Gross per 24 hour   Intake 103.55 ml   Output 2300 ml   Net -2196.45 ml       Labs:  Recent Results (from the past 24 hour(s))   Culture, Blood 1    Collection Time: 22 10:16 AM    Specimen: Blood   Result Value Ref Range    Specimen Description . BLOOD GRN ONLY 1ML, L BREAST     Culture NO GROWTH 12 HOURS    POC Glucose Fingerstick    Collection Time: 22 12:07 PM   Result Value Ref Range    POC Glucose >600 (HH) 65 - 105 mg/dL   Lactic Acid    Collection Time: 22  1:10 PM   Result Value Ref Range    Lactic Acid 11.1 (H) 0.5 - 2.2 mmol/L   Basic Metabolic Panel    Collection Time: 22  4:02 PM   Result Value Ref Range    Glucose 695 (HH) 70 - 99 mg/dL    BUN 35 (H) 6 - 20 mg/dL CREATININE 1.23 (H) 0.50 - 0.90 mg/dL    Calcium 7.1 (L) 8.6 - 10.4 mg/dL    Sodium 127 (L) 135 - 144 mmol/L    Potassium 4.3 3.7 - 5.3 mmol/L    Chloride 94 (L) 98 - 107 mmol/L    CO2 13 (L) 20 - 31 mmol/L    Anion Gap 20 (H) 9 - 17 mmol/L    GFR Non-African American 48 (L) >60 mL/min    GFR  58 (L) >60 mL/min    GFR Comment         Magnesium    Collection Time: 03/12/22  4:02 PM   Result Value Ref Range    Magnesium 2.4 1.6 - 2.6 mg/dL   Phosphorus    Collection Time: 03/12/22  4:02 PM   Result Value Ref Range    Phosphorus 1.1 (L) 2.6 - 4.5 mg/dL   Basic Metabolic Panel    Collection Time: 03/12/22  7:08 PM   Result Value Ref Range    Glucose 682 (HH) 70 - 99 mg/dL    BUN 36 (H) 6 - 20 mg/dL    CREATININE 1.22 (H) 0.50 - 0.90 mg/dL    Calcium 7.2 (L) 8.6 - 10.4 mg/dL    Sodium 125 (L) 135 - 144 mmol/L    Potassium 5.4 (H) 3.7 - 5.3 mmol/L    Chloride 93 (L) 98 - 107 mmol/L    CO2 13 (L) 20 - 31 mmol/L    Anion Gap 19 (H) 9 - 17 mmol/L    GFR Non-African American 48 (L) >60 mL/min    GFR  59 (L) >60 mL/min    GFR Comment         Magnesium    Collection Time: 03/12/22  7:08 PM   Result Value Ref Range    Magnesium 2.7 (H) 1.6 - 2.6 mg/dL   Phosphorus    Collection Time: 03/12/22  7:08 PM   Result Value Ref Range    Phosphorus 2.1 (L) 2.6 - 4.5 mg/dL   Glucose, Random    Collection Time: 03/12/22  8:54 PM   Result Value Ref Range    Glucose 623 (HH) 70 - 99 mg/dL   Glucose, Random    Collection Time: 03/12/22 10:21 PM   Result Value Ref Range    Glucose 582 (HH) 70 - 99 mg/dL   POC Glucose Fingerstick    Collection Time: 03/12/22 10:25 PM   Result Value Ref Range    POC Glucose 514 (HH) 65 - 105 mg/dL   POC Glucose Fingerstick    Collection Time: 03/12/22 11:27 PM   Result Value Ref Range    POC Glucose 562 (HH) 65 - 105 mg/dL   Basic Metabolic Panel    Collection Time: 03/12/22 11:30 PM   Result Value Ref Range    Glucose 543 (HH) 70 - 99 mg/dL    BUN 35 (H) 6 - 20 mg/dL CREATININE 1.19 (H) 0.50 - 0.90 mg/dL    Calcium 7.0 (L) 8.6 - 10.4 mg/dL    Sodium 129 (L) 135 - 144 mmol/L    Potassium 4.8 3.7 - 5.3 mmol/L    Chloride 98 98 - 107 mmol/L    CO2 16 (L) 20 - 31 mmol/L    Anion Gap 15 9 - 17 mmol/L    GFR Non-African American 50 (L) >60 mL/min    GFR African American >60 >60 mL/min    GFR Comment         Magnesium    Collection Time: 03/12/22 11:30 PM   Result Value Ref Range    Magnesium 2.5 1.6 - 2.6 mg/dL   Phosphorus    Collection Time: 03/12/22 11:30 PM   Result Value Ref Range    Phosphorus 2.0 (L) 2.6 - 4.5 mg/dL   POC Glucose Fingerstick    Collection Time: 03/13/22  1:00 AM   Result Value Ref Range    POC Glucose 564 (HH) 65 - 105 mg/dL   POC Glucose Fingerstick    Collection Time: 03/13/22  3:01 AM   Result Value Ref Range    POC Glucose 488 (HH) 65 - 105 mg/dL   POC Glucose Fingerstick    Collection Time: 03/13/22  3:58 AM   Result Value Ref Range    POC Glucose 338 (H) 65 - 105 mg/dL   Basic Metabolic Panel    Collection Time: 03/13/22  4:08 AM   Result Value Ref Range    Glucose 304 (H) 70 - 99 mg/dL    BUN 36 (H) 6 - 20 mg/dL    CREATININE 1.16 (H) 0.50 - 0.90 mg/dL    Calcium 7.0 (L) 8.6 - 10.4 mg/dL    Sodium 132 (L) 135 - 144 mmol/L    Potassium 4.7 3.7 - 5.3 mmol/L    Chloride 103 98 - 107 mmol/L    CO2 15 (L) 20 - 31 mmol/L    Anion Gap 14 9 - 17 mmol/L    GFR Non-African American 51 (L) >60 mL/min    GFR African American >60 >60 mL/min    GFR Comment         Magnesium    Collection Time: 03/13/22  4:08 AM   Result Value Ref Range    Magnesium 2.2 1.6 - 2.6 mg/dL   Phosphorus    Collection Time: 03/13/22  4:08 AM   Result Value Ref Range    Phosphorus 0.9 (LL) 2.6 - 4.5 mg/dL   POC Glucose Fingerstick    Collection Time: 03/13/22  4:53 AM   Result Value Ref Range    POC Glucose 287 (H) 65 - 105 mg/dL   POC Glucose Fingerstick    Collection Time: 03/13/22  5:00 AM   Result Value Ref Range    POC Glucose 289 (H) 65 - 105 mg/dL   POC Glucose Fingerstick Collection Time: 03/13/22  5:47 AM   Result Value Ref Range    POC Glucose 279 (H) 65 - 105 mg/dL   POC Glucose Fingerstick    Collection Time: 03/13/22  6:11 AM   Result Value Ref Range    POC Glucose 278 (H) 65 - 105 mg/dL   POC Glucose Fingerstick    Collection Time: 03/13/22  6:52 AM   Result Value Ref Range    POC Glucose 267 (H) 65 - 105 mg/dL   POC Glucose Fingerstick    Collection Time: 03/13/22  7:57 AM   Result Value Ref Range    POC Glucose 209 (H) 65 - 105 mg/dL   Culture, Blood 2    Collection Time: 03/13/22  8:25 AM    Specimen: Blood   Result Value Ref Range    Specimen Description . BLOOD     Culture NO GROWTH <24 HRS    Magnesium    Collection Time: 03/13/22  8:26 AM   Result Value Ref Range    Magnesium 2.2 1.6 - 2.6 mg/dL   Phosphorus    Collection Time: 03/13/22  8:26 AM   Result Value Ref Range    Phosphorus 1.6 (L) 2.6 - 4.5 mg/dL   Basic Metabolic Panel    Collection Time: 03/13/22  8:26 AM   Result Value Ref Range    Glucose 218 (H) 70 - 99 mg/dL    BUN 34 (H) 6 - 20 mg/dL    CREATININE 1.05 (H) 0.50 - 0.90 mg/dL    Calcium 6.7 (L) 8.6 - 10.4 mg/dL    Sodium 135 135 - 144 mmol/L    Potassium 4.9 3.7 - 5.3 mmol/L    Chloride 107 98 - 107 mmol/L    CO2 15 (L) 20 - 31 mmol/L    Anion Gap 13 9 - 17 mmol/L    GFR Non-African American 57 (L) >60 mL/min    GFR African American >60 >60 mL/min    GFR Comment         Amylase    Collection Time: 03/13/22  8:26 AM   Result Value Ref Range    Amylase 727 (HH) 28 - 100 U/L   Lipase    Collection Time: 03/13/22  8:26 AM   Result Value Ref Range    Lipase 69 (H) 13 - 60 U/L   Hepatic Function Panel    Collection Time: 03/13/22  8:26 AM   Result Value Ref Range    Albumin 2.1 (L) 3.5 - 5.2 g/dL    Alkaline Phosphatase 383 (H) 35 - 104 U/L     (H) 5 - 33 U/L     (H) <32 U/L    Total Bilirubin 2.80 (H) 0.3 - 1.2 mg/dL    Bilirubin, Direct 2.43 (H) <0.31 mg/dL    Bilirubin, Indirect 0.37 0.00 - 1.00 mg/dL    Total Protein 4.2 (L) 6.4 - 8.3 g/dL POC Glucose Fingerstick    Collection Time: 03/13/22  9:15 AM   Result Value Ref Range    POC Glucose 200 (H) 65 - 105 mg/dL   Lactic Acid    Collection Time: 03/13/22 10:16 AM   Result Value Ref Range    Lactic Acid 2.8 (H) 0.5 - 2.2 mmol/L       Lab Results   Component Value Date/Time    SPECIAL NOT REPORTED 09/03/2019 07:52 PM     Lab Results   Component Value Date/Time    CULTURE NO GROWTH <24 HRS 03/13/2022 08:25 AM       Recent Labs     03/13/22  0611 03/13/22  0652 03/13/22  0757 03/13/22  0915   POCGLU 278* 267* 209* 200*       Radiology:    CT Head WO Contrast    Result Date: 3/12/2022  No acute intracranial abnormality. XR CHEST PORTABLE    Result Date: 3/12/2022  Low lung volumes. Diffuse interstitial opacities in the perihilar areas with left perihilar and lower lobe alveolar type opacities consistent with multifocal airspace disease. Physical Examination:        Physical Exam  Constitutional:       General: She is not in acute distress. Appearance: Normal appearance. She is obese. HENT:      Head: Normocephalic and atraumatic. Right Ear: External ear normal.      Left Ear: External ear normal.      Nose: Nose normal.   Eyes:      Extraocular Movements: Extraocular movements intact. Cardiovascular:      Rate and Rhythm: Normal rate and regular rhythm. Pulses: Normal pulses. Heart sounds: Normal heart sounds. No murmur heard. Pulmonary:      Effort: Pulmonary effort is normal. No respiratory distress. Breath sounds: Normal breath sounds. No wheezing. Abdominal:      General: Bowel sounds are normal. There is no distension. Palpations: Abdomen is soft. Tenderness: There is no abdominal tenderness. There is no guarding or rebound. Musculoskeletal:      Right lower leg: No edema. Left lower leg: No edema. Skin:     General: Skin is warm. Coloration: Skin is jaundiced (Mild). Neurological:      General: No focal deficit present. Mental Status: She is alert and oriented to person, place, and time. Mental status is at baseline. Psychiatric:         Mood and Affect: Mood normal.         Behavior: Behavior normal.         Thought Content: Thought content normal.           Assessment:        Primary Problem  DKA, type 2, not at goal [facility])    Active Hospital Problems    Diagnosis Date Noted    DKA, type 2, not at goal [facility]) [E11.10] 03/12/2022    Alcohol intoxication ([facility] [address] [F10.929] 12/16/2015       Plan:        Diabetic Ketoacidosis, 2/2 poor insulin compliance  -NPO, initiate DKA protocol  -BMP q4h, initial Mag and Phos levels, glucose checks every hour  -Urinalysis reviewed and urine ketones: negative; Betahydroxybutarate levels: negative  -Insulin Regular started at 0.1 Units/kg/hr  -D5 and 0.45% NS at 150 mL/hr when blood glucose less than 250 mg/dL  -Will monitor bicarb and anion gap, bridge with home lantus dose and begin diet PO when bicarb >15 and gap closed x 2 measurements  -Discontinue Insulin drip 2 hours post PO intake.   -Hypoglycemia, phos and potassium replacement protocols in place  SIRS positive, foul-smelling stools possibly secondary to C. difficile:   · Temperature 101.5, heart rate 143, respiratory rate 38, WBC within normal limits   · C. difficile panel sent, started on Rocephin empirically  · Urine and blood cultures sent  Alcohol abuse: CIWA protocol, thiamine 50 mg IV  GAIL: Creatinine 1.50 on admission, now 1.05; continue fluids    Holding home medications as patient is n.p.o.     DVT prophylaxis: Lovenox 30 mg subcutaneous daily  GI prophylaxis:  Pepcid 20 mg IV twice daily  Disposition: Likely home  Code: Full Code   Diet: Diet NPO Exceptions are: Ice Chips   Consults: IP CONSULT TO INTERNAL MEDICINE  IP CONSULT TO PULMONOLOGY  IP CONSULT TO SOCIAL WORK  IP CONSULT TO SOCIAL WORK     Salima Conner MD  3/13/2022  10:53 AM     Attending Physician Statement  I have discussed the care of Levi Cifuentes with the resident team. I have examined the patient myself and taken ros and hpi , including pertinent history and exam findings,  with the resident. I have reviewed the key elements of all parts of the encounter with the resident. I agree with the assessment, plan and orders as documented by the resident. Principal Problem:    DKA, type 2, not at goal [facility])  Active Problems:    Alcohol intoxication ([facility] [address]    GAIL (acute kidney injury) ([facility] [address]  Resolved Problems:    * No resolved hospital problems.  *      DKA improving  Gap closed, sugars better  Elevated LFT- suspect alc hepatitis  Febrile today 101.9- check hepatitis panel, CT abdo  C.diff neg  Bld cx in process Urine cx in process  Sepsis unclear source-start Zosyn and Vanco, if evidence of colitis on CT abdomen will also add Flagyl  Persistent sinus tachycardia now significantly elevated blood pressure up to 180 sustained, suspected alcohol withdrawal contributing, unable to start clonidine as patient still vomiting, will continue with as needed Lopressor for now  Starting clear liquid diet, resume home medication; patient is on calcium channel blockers for history of SVT; 12 lead EKG was ordered but not done yesterday and today, will reorder as stat      Electronically signed by Judge Kai MD

## 2022-03-13 NOTE — PROGRESS NOTES
Dr Ana Phillips responded about fluid orders and is okay not adding potassium into the fluids, only replacing as protocol. Liver profile labs also reviewed.

## 2022-03-13 NOTE — FLOWSHEET NOTE
Spiritual Care visit to support pt. Pt appeared to be agitated and was mumbling when writer entered room. Pt was alert but was not able to respond to writer in coherent sentences.   No family present at time of rounding       03/13/22 1313   Encounter Summary   Services provided to: Patient   Referral/Consult From: Rounding   Continue Visiting   (3/13/22)   Complexity of Encounter Moderate   Length of Encounter 15 minutes   Spiritual/Rastafari   Type Spiritual support   Assessment Unable to respond   Intervention Sustaining presence/ Ministry of presence

## 2022-03-13 NOTE — PROGRESS NOTES
Vancomycin Dosing by Pharmacy - Initial Note   TODAY'S DATE:  3/13/2022  Patient name, age:  Diana Reyna, 42 y.o. Indication: Sepsis of unknown origin, empiric  Additional antimicrobials:  cefepime     Allergies:  Asa [aspirin], Betadine [povidone iodine], Celexa [citalopram hydrobromide], Citalopram, Lasix [furosemide], Macrobid [nitrofurantoin], Norco [hydrocodone-acetaminophen], Betadine [povidone iodine], Codeine, Lithium, Paroxetine, Paxil [paroxetine hcl], Tape [adhesive tape], and Tegretol [carbamazepine]   Actual Weight:    Wt Readings from Last 1 Encounters:   03/12/22 280 lb (127 kg)     Labs/Ancillary Data  Estimated Creatinine Clearance: 101 mL/min (A) (based on SCr of 1.02 mg/dL (H)). Recent Labs     03/12/22  0749 03/12/22  1602 03/13/22  0408 03/13/22  0826 03/13/22  1426   CREATININE 1.50*   < > 1.16* 1.05* 1.02*   BUN 31*   < > 36* 34* 31*   WBC 8.1  --   --   --   --     < > = values in this interval not displayed. Procalcitonin   Date Value Ref Range Status   03/12/2022 0.11 (H) <0.09 ng/mL Final     Comment:           Suspected Sepsis:  <0.50 ng/mL     Low likelihood of sepsis. 0.50-2.00 ng/mL     Increased likelihood of sepsis. Antibiotics encouraged. >2.00 ng/mL     High risk of sepsis/shock. Antibiotics strongly encouraged. Suspected Lower Resp Tract Infections:  <0.24 ng/mL     Low likelihood of bacterial infection. >0.24 ng/mL     Increased likelihood of bacterial infection. Antibiotics encouraged. With successful antibiotic therapy, PCT levels should decrease rapidly. (Half-life of 24 to   36 hours.)        Procalcitonin values from samples collected within the first 6 hours of systemic infection   may still be low. Retesting may be indicated. Values from day 1 and day 4 can be entered into the Change in Procalcitonin Calculator   ([email or web address]) to determine the patient's Mortality Risk Prognosis        In healthy neonates, plasma Procalcitonin (PCT) concentrations increase gradually after   birth, reaching peak values at about 24 hours of age then decrease to normal values below   0.5 ng/mL by 48-72 hours of age. Intake/Output Summary (Last 24 hours) at 3/13/2022 1641  Last data filed at 3/13/2022 1352  Gross per 24 hour   Intake 103.55 ml   Output 1925 ml   Net -1821.45 ml     Temp: 101.9    Recent vancomycin administrations        No vancomycin IV orders with administrations found. Culture Date / Source  /  Results  See Micro     MRSA Nasal Swab: not ordered. Order placed by pharmacy. Fabien Ricardo PLAN   Initial loading dose of 25mg/kg (max of 2,500 mg) = 2500  mg  x 1, then  vancomycin 1250 mg IV every 18 hours. Ensured BUN/sCr ordered at baseline and every 48 hours x at least 3 levels, then at least weekly. Vancomycin level ordered for 3/15 @ 0600. Will use bayesian method for dosing. This level will not be a trough. Target AUC/JAVED: 400-600. Vancomycin Target Concentration Parameters  Treatment  Population Target AUC/JAVED Target Trough   Invasive MRSA Infection (bacteremia, pneumonia, meningitis, endocarditis, osteomyelitis)  Sepsis (undifferentiated) 400-600 N/A   Infection due to non-MRSA pathogen  Empiric treatment of non-invasive MRSA infection  (SSTI, UTI) <500 10-15 mg/L   CrCl < 29 mL/min  Rapidly fluctuating serum creatinine   GAIL N/A < 15 mg/L     Renal replacement therapy is dosed by levels, per hospital protocol. Abbreviations  * Pauc: probability that AUC is >400 (efficacy); Pconc: probability that Ctrough is above 20 ?g/mL (toxicity); Tox: Probability of nephrotoxicity, based on Rolo et al. Clin Infect Dis 2009. Loading dose: 2500 mg at 18:00 03/13/2022. Regimen: 1250 mg IV every 18 hours.   Start time: 16:48 on 03/13/2022  Exposure target: AUC24 (range)400-600 mg/L.hr   AUC24,ss: 527 mg/L.hr  Probability of AUC24 > 400: 70 %  Ctrough,ss: 15.5 mg/L  Probability of Ctrough,ss > 20: 37 %  Probability of nephrotoxicity (Lodise DELMY 2009): 11 %    Thank you for the consult. Pharmacy will continue to follow.      Brendon DianeD 3/13/2022 4:48 PM  [facility] PGY1 Resident

## 2022-03-13 NOTE — PROGRESS NOTES
Heart rate remains elevated after 1L bolus. Dr. Zion Luciano notified and orders received for another 1L bolus, thiamine IV Q8 & lopressor IVP.

## 2022-03-13 NOTE — PROGRESS NOTES
RN notified Dr Gil Vidal of changing the insulin gtt. To the multiplier and change of fluids per protocol. Dr Gil Vidal wants to check the next potassium level before deciding on what fluids he wants to order. Will result potassium level when it is back.

## 2022-03-14 ENCOUNTER — APPOINTMENT (OUTPATIENT)
Dept: NON INVASIVE DIAGNOSTICS | Age: 42
DRG: 871 | End: 2022-03-14
Payer: MEDICARE

## 2022-03-14 PROBLEM — J18.9 MULTIFOCAL PNEUMONIA: Status: ACTIVE | Noted: 2022-03-14

## 2022-03-14 PROBLEM — K85.90 ACUTE PANCREATITIS: Status: ACTIVE | Noted: 2022-03-14

## 2022-03-14 PROBLEM — K70.10 ALCOHOLIC HEPATITIS: Status: ACTIVE | Noted: 2022-03-14

## 2022-03-14 LAB
ABSOLUTE BANDS #: 1.32 K/UL (ref 0–1)
ABSOLUTE EOS #: 0 K/UL (ref 0–0.4)
ABSOLUTE LYMPH #: 0.44 K/UL (ref 1–4.8)
ABSOLUTE MONO #: 0.33 K/UL (ref 0.1–1.3)
ADENOVIRUS PCR: NOT DETECTED
ANION GAP SERPL CALCULATED.3IONS-SCNC: 10 MMOL/L (ref 9–17)
ANION GAP SERPL CALCULATED.3IONS-SCNC: 11 MMOL/L (ref 9–17)
BANDS: 12 % (ref 0–10)
BASOPHILS # BLD: 0 % (ref 0–2)
BASOPHILS ABSOLUTE: 0 K/UL (ref 0–0.2)
BORDETELLA PARAPERTUSSIS: NOT DETECTED
BORDETELLA PERTUSSIS PCR: NOT DETECTED
BUN BLDV-MCNC: 13 MG/DL (ref 6–20)
BUN BLDV-MCNC: 16 MG/DL (ref 6–20)
BUN BLDV-MCNC: 19 MG/DL (ref 6–20)
BUN BLDV-MCNC: 21 MG/DL (ref 6–20)
CALCIUM SERPL-MCNC: 6.6 MG/DL (ref 8.6–10.4)
CALCIUM SERPL-MCNC: 6.6 MG/DL (ref 8.6–10.4)
CALCIUM SERPL-MCNC: 6.8 MG/DL (ref 8.6–10.4)
CALCIUM SERPL-MCNC: 6.8 MG/DL (ref 8.6–10.4)
CAMPYLOBACTER PCR: NORMAL
CHLAMYDIA PNEUMONIAE BY PCR: NOT DETECTED
CHLORIDE BLD-SCNC: 103 MMOL/L (ref 98–107)
CHLORIDE BLD-SCNC: 105 MMOL/L (ref 98–107)
CHLORIDE BLD-SCNC: 106 MMOL/L (ref 98–107)
CHLORIDE BLD-SCNC: 107 MMOL/L (ref 98–107)
CHOLESTEROL/HDL RATIO: 5.1
CHOLESTEROL: 56 MG/DL
CO2: 16 MMOL/L (ref 20–31)
CO2: 17 MMOL/L (ref 20–31)
CO2: 17 MMOL/L (ref 20–31)
CO2: 18 MMOL/L (ref 20–31)
CORONAVIRUS 229E PCR: NOT DETECTED
CORONAVIRUS HKU1 PCR: NOT DETECTED
CORONAVIRUS NL63 PCR: NOT DETECTED
CORONAVIRUS OC43 PCR: NOT DETECTED
CREAT SERPL-MCNC: 0.8 MG/DL (ref 0.5–0.9)
CREAT SERPL-MCNC: 0.87 MG/DL (ref 0.5–0.9)
CREAT SERPL-MCNC: 0.96 MG/DL (ref 0.5–0.9)
CREAT SERPL-MCNC: 0.96 MG/DL (ref 0.5–0.9)
DIRECT EXAM: NORMAL
DIRECT EXAM: NORMAL
E COLI ENTEROTOXIGENIC PCR: NORMAL
EKG ATRIAL RATE: 101 BPM
EKG ATRIAL RATE: 118 BPM
EKG ATRIAL RATE: 130 BPM
EKG ATRIAL RATE: 132 BPM
EKG ATRIAL RATE: 163 BPM
EKG P AXIS: 57 DEGREES
EKG P AXIS: 58 DEGREES
EKG P AXIS: 60 DEGREES
EKG P-R INTERVAL: 132 MS
EKG P-R INTERVAL: 136 MS
EKG P-R INTERVAL: 154 MS
EKG Q-T INTERVAL: 252 MS
EKG Q-T INTERVAL: 262 MS
EKG Q-T INTERVAL: 300 MS
EKG Q-T INTERVAL: 300 MS
EKG Q-T INTERVAL: 334 MS
EKG QRS DURATION: 106 MS
EKG QRS DURATION: 78 MS
EKG QRS DURATION: 80 MS
EKG QRS DURATION: 82 MS
EKG QRS DURATION: 82 MS
EKG QTC CALCULATION (BAZETT): 441 MS
EKG QTC CALCULATION (BAZETT): 444 MS
EKG QTC CALCULATION (BAZETT): 449 MS
EKG QTC CALCULATION (BAZETT): 450 MS
EKG QTC CALCULATION (BAZETT): 468 MS
EKG R AXIS: 50 DEGREES
EKG R AXIS: 69 DEGREES
EKG R AXIS: 71 DEGREES
EKG R AXIS: 74 DEGREES
EKG R AXIS: 78 DEGREES
EKG T AXIS: 16 DEGREES
EKG T AXIS: 16 DEGREES
EKG T AXIS: 43 DEGREES
EKG T AXIS: 57 DEGREES
EKG T AXIS: 63 DEGREES
EKG VENTRICULAR RATE: 118 BPM
EKG VENTRICULAR RATE: 130 BPM
EKG VENTRICULAR RATE: 132 BPM
EKG VENTRICULAR RATE: 177 BPM
EKG VENTRICULAR RATE: 192 BPM
EOSINOPHILS RELATIVE PERCENT: 0 % (ref 0–4)
GFR AFRICAN AMERICAN: >60 ML/MIN
GFR NON-AFRICAN AMERICAN: >60 ML/MIN
GFR SERPL CREATININE-BSD FRML MDRD: ABNORMAL ML/MIN/{1.73_M2}
GLUCOSE BLD-MCNC: 144 MG/DL (ref 65–105)
GLUCOSE BLD-MCNC: 147 MG/DL (ref 65–105)
GLUCOSE BLD-MCNC: 148 MG/DL (ref 65–105)
GLUCOSE BLD-MCNC: 170 MG/DL (ref 70–99)
GLUCOSE BLD-MCNC: 173 MG/DL (ref 65–105)
GLUCOSE BLD-MCNC: 177 MG/DL (ref 65–105)
GLUCOSE BLD-MCNC: 178 MG/DL (ref 65–105)
GLUCOSE BLD-MCNC: 186 MG/DL (ref 65–105)
GLUCOSE BLD-MCNC: 190 MG/DL (ref 65–105)
GLUCOSE BLD-MCNC: 190 MG/DL (ref 65–105)
GLUCOSE BLD-MCNC: 196 MG/DL (ref 65–105)
GLUCOSE BLD-MCNC: 201 MG/DL (ref 65–105)
GLUCOSE BLD-MCNC: 236 MG/DL (ref 65–105)
GLUCOSE BLD-MCNC: 242 MG/DL (ref 65–105)
GLUCOSE BLD-MCNC: 242 MG/DL (ref 65–105)
GLUCOSE BLD-MCNC: 247 MG/DL (ref 65–105)
GLUCOSE BLD-MCNC: 249 MG/DL (ref 70–99)
GLUCOSE BLD-MCNC: 280 MG/DL (ref 70–99)
GLUCOSE BLD-MCNC: 366 MG/DL (ref 70–99)
GLUCOSE BLD-MCNC: 89 MG/DL (ref 65–105)
HCT VFR BLD CALC: 39.9 % (ref 36–46)
HDLC SERPL-MCNC: 11 MG/DL
HEMOGLOBIN: 13.4 G/DL (ref 12–16)
HUMAN METAPNEUMOVIRUS PCR: NOT DETECTED
INFLUENZA A BY PCR: NOT DETECTED
INFLUENZA B BY PCR: NOT DETECTED
LACTIC ACID: 2.2 MMOL/L (ref 0.5–2.2)
LDL CHOLESTEROL: ABNORMAL MG/DL (ref 0–130)
LEGIONELLA PNEUMOPHILIA AG, URINE: NEGATIVE
LV EF: 55 %
LVEF MODALITY: NORMAL
LYMPHOCYTES # BLD: 4 % (ref 24–44)
MAGNESIUM: 1.8 MG/DL (ref 1.6–2.6)
MAGNESIUM: 1.8 MG/DL (ref 1.6–2.6)
MAGNESIUM: 1.9 MG/DL (ref 1.6–2.6)
MAGNESIUM: 2.3 MG/DL (ref 1.6–2.6)
MCH RBC QN AUTO: 30.2 PG (ref 26–34)
MCHC RBC AUTO-ENTMCNC: 33.5 G/DL (ref 31–37)
MCV RBC AUTO: 90.2 FL (ref 80–100)
METAMYELOCYTES ABSOLUTE COUNT: 0.11 K/UL
METAMYELOCYTES: 1 %
MONOCYTES # BLD: 3 % (ref 1–7)
MORPHOLOGY: ABNORMAL
MORPHOLOGY: ABNORMAL
MYCOPLASMA PNEUMONIAE PCR: NOT DETECTED
PARAINFLUENZA 1 PCR: NOT DETECTED
PARAINFLUENZA 2 PCR: NOT DETECTED
PARAINFLUENZA 3 PCR: NOT DETECTED
PARAINFLUENZA 4 PCR: NOT DETECTED
PDW BLD-RTO: 14.1 % (ref 11.5–14.9)
PHOSPHORUS: 1.9 MG/DL (ref 2.6–4.5)
PHOSPHORUS: 2 MG/DL (ref 2.6–4.5)
PHOSPHORUS: 2.2 MG/DL (ref 2.6–4.5)
PHOSPHORUS: 2.5 MG/DL (ref 2.6–4.5)
PHOSPHORUS: 6.2 MG/DL (ref 2.6–4.5)
PLATELET # BLD: 104 K/UL (ref 150–450)
PLESIOMONAS SHIGELLOIDES PCR: NORMAL
PMV BLD AUTO: 9.4 FL (ref 6–12)
POTASSIUM SERPL-SCNC: 3.7 MMOL/L (ref 3.7–5.3)
POTASSIUM SERPL-SCNC: 3.9 MMOL/L (ref 3.7–5.3)
PROCALCITONIN: 0.47 NG/ML
RBC # BLD: 4.42 M/UL (ref 4–5.2)
RESP SYNCYTIAL VIRUS PCR: NOT DETECTED
RHINO/ENTEROVIRUS PCR: NOT DETECTED
SALMONELLA PCR: NORMAL
SARS-COV-2, PCR: NOT DETECTED
SEG NEUTROPHILS: 80 % (ref 36–66)
SEGMENTED NEUTROPHILS ABSOLUTE COUNT: 8.8 K/UL (ref 1.3–9.1)
SHIGATOXIN GENE PCR: NORMAL
SHIGELLA SP PCR: NORMAL
SODIUM BLD-SCNC: 131 MMOL/L (ref 135–144)
SODIUM BLD-SCNC: 132 MMOL/L (ref 135–144)
SODIUM BLD-SCNC: 133 MMOL/L (ref 135–144)
SODIUM BLD-SCNC: 134 MMOL/L (ref 135–144)
SPECIMEN DESCRIPTION: NORMAL
TRIGL SERPL-MCNC: 233 MG/DL
TROPONIN, HIGH SENSITIVITY: 39 NG/L (ref 0–14)
TSH SERPL DL<=0.05 MIU/L-ACNC: 1.71 MIU/L (ref 0.3–5)
VIBRIO PCR: NORMAL
WBC # BLD: 11 K/UL (ref 3.5–11)
YERSINIA ENTEROCOLITICA PCR: NORMAL

## 2022-03-14 PROCEDURE — 84443 ASSAY THYROID STIM HORMONE: CPT

## 2022-03-14 PROCEDURE — 6360000002 HC RX W HCPCS: Performed by: INTERNAL MEDICINE

## 2022-03-14 PROCEDURE — 6370000000 HC RX 637 (ALT 250 FOR IP): Performed by: STUDENT IN AN ORGANIZED HEALTH CARE EDUCATION/TRAINING PROGRAM

## 2022-03-14 PROCEDURE — 2580000003 HC RX 258: Performed by: EMERGENCY MEDICINE

## 2022-03-14 PROCEDURE — 2580000003 HC RX 258: Performed by: STUDENT IN AN ORGANIZED HEALTH CARE EDUCATION/TRAINING PROGRAM

## 2022-03-14 PROCEDURE — 93005 ELECTROCARDIOGRAM TRACING: CPT

## 2022-03-14 PROCEDURE — 36415 COLL VENOUS BLD VENIPUNCTURE: CPT

## 2022-03-14 PROCEDURE — 87641 MR-STAPH DNA AMP PROBE: CPT

## 2022-03-14 PROCEDURE — 85025 COMPLETE CBC W/AUTO DIFF WBC: CPT

## 2022-03-14 PROCEDURE — 2580000003 HC RX 258: Performed by: NURSE PRACTITIONER

## 2022-03-14 PROCEDURE — 2500000003 HC RX 250 WO HCPCS: Performed by: NURSE PRACTITIONER

## 2022-03-14 PROCEDURE — 99233 SBSQ HOSP IP/OBS HIGH 50: CPT | Performed by: INTERNAL MEDICINE

## 2022-03-14 PROCEDURE — 6360000002 HC RX W HCPCS: Performed by: STUDENT IN AN ORGANIZED HEALTH CARE EDUCATION/TRAINING PROGRAM

## 2022-03-14 PROCEDURE — 6360000004 HC RX CONTRAST MEDICATION: Performed by: STUDENT IN AN ORGANIZED HEALTH CARE EDUCATION/TRAINING PROGRAM

## 2022-03-14 PROCEDURE — 82800 BLOOD PH: CPT

## 2022-03-14 PROCEDURE — 80061 LIPID PANEL: CPT

## 2022-03-14 PROCEDURE — 93306 TTE W/DOPPLER COMPLETE: CPT

## 2022-03-14 PROCEDURE — 87449 NOS EACH ORGANISM AG IA: CPT

## 2022-03-14 PROCEDURE — 2500000003 HC RX 250 WO HCPCS: Performed by: STUDENT IN AN ORGANIZED HEALTH CARE EDUCATION/TRAINING PROGRAM

## 2022-03-14 PROCEDURE — 82805 BLOOD GASES W/O2 SATURATION: CPT

## 2022-03-14 PROCEDURE — 0202U NFCT DS 22 TRGT SARS-COV-2: CPT

## 2022-03-14 PROCEDURE — 84100 ASSAY OF PHOSPHORUS: CPT

## 2022-03-14 PROCEDURE — 80048 BASIC METABOLIC PNL TOTAL CA: CPT

## 2022-03-14 PROCEDURE — 99213 OFFICE O/P EST LOW 20 MIN: CPT

## 2022-03-14 PROCEDURE — 2500000003 HC RX 250 WO HCPCS

## 2022-03-14 PROCEDURE — 83735 ASSAY OF MAGNESIUM: CPT

## 2022-03-14 PROCEDURE — 84484 ASSAY OF TROPONIN QUANT: CPT

## 2022-03-14 PROCEDURE — 84145 PROCALCITONIN (PCT): CPT

## 2022-03-14 PROCEDURE — 6370000000 HC RX 637 (ALT 250 FOR IP): Performed by: EMERGENCY MEDICINE

## 2022-03-14 PROCEDURE — 93005 ELECTROCARDIOGRAM TRACING: CPT | Performed by: INTERNAL MEDICINE

## 2022-03-14 PROCEDURE — 82947 ASSAY GLUCOSE BLOOD QUANT: CPT

## 2022-03-14 PROCEDURE — 2000000000 HC ICU R&B

## 2022-03-14 PROCEDURE — 2500000003 HC RX 250 WO HCPCS: Performed by: INTERNAL MEDICINE

## 2022-03-14 PROCEDURE — 86738 MYCOPLASMA ANTIBODY: CPT

## 2022-03-14 PROCEDURE — 83605 ASSAY OF LACTIC ACID: CPT

## 2022-03-14 RX ORDER — DILTIAZEM HYDROCHLORIDE 5 MG/ML
10 INJECTION INTRAVENOUS ONCE
Status: COMPLETED | OUTPATIENT
Start: 2022-03-14 | End: 2022-03-14

## 2022-03-14 RX ADMIN — SODIUM CHLORIDE 3.64 UNITS/HR: 9 INJECTION, SOLUTION INTRAVENOUS at 11:14

## 2022-03-14 RX ADMIN — DILTIAZEM HYDROCHLORIDE 10 MG: 5 INJECTION INTRAVENOUS at 04:03

## 2022-03-14 RX ADMIN — ANTI-FUNGAL POWDER MICONAZOLE NITRATE TALC FREE: 1.42 POWDER TOPICAL at 21:26

## 2022-03-14 RX ADMIN — ENOXAPARIN SODIUM 90 MG: 100 INJECTION SUBCUTANEOUS at 12:30

## 2022-03-14 RX ADMIN — POTASSIUM CHLORIDE 10 MEQ: 10 INJECTION, SOLUTION INTRAVENOUS at 12:59

## 2022-03-14 RX ADMIN — POTASSIUM CHLORIDE 10 MEQ: 10 INJECTION, SOLUTION INTRAVENOUS at 11:16

## 2022-03-14 RX ADMIN — DILTIAZEM HYDROCHLORIDE 5 MG/HR: 5 INJECTION, SOLUTION INTRAVENOUS at 04:06

## 2022-03-14 RX ADMIN — METOPROLOL TARTRATE 5 MG: 1 INJECTION, SOLUTION INTRAVENOUS at 08:09

## 2022-03-14 RX ADMIN — THIAMINE HYDROCHLORIDE 50 MG: 100 INJECTION, SOLUTION INTRAMUSCULAR; INTRAVENOUS at 17:18

## 2022-03-14 RX ADMIN — ENOXAPARIN SODIUM 30 MG: 100 INJECTION SUBCUTANEOUS at 08:09

## 2022-03-14 RX ADMIN — SODIUM CHLORIDE, PRESERVATIVE FREE 10 ML: 5 INJECTION INTRAVENOUS at 00:04

## 2022-03-14 RX ADMIN — MAGNESIUM SULFATE HEPTAHYDRATE 1000 MG: 1 INJECTION, SOLUTION INTRAVENOUS at 11:16

## 2022-03-14 RX ADMIN — POTASSIUM CHLORIDE 10 MEQ: 10 INJECTION, SOLUTION INTRAVENOUS at 18:23

## 2022-03-14 RX ADMIN — FAMOTIDINE 20 MG: 10 INJECTION, SOLUTION INTRAVENOUS at 08:09

## 2022-03-14 RX ADMIN — DEXTROSE AND SODIUM CHLORIDE: 5; 450 INJECTION, SOLUTION INTRAVENOUS at 08:04

## 2022-03-14 RX ADMIN — DILTIAZEM HYDROCHLORIDE 15 MG/HR: 5 INJECTION, SOLUTION INTRAVENOUS at 22:36

## 2022-03-14 RX ADMIN — POTASSIUM CHLORIDE 10 MEQ: 10 INJECTION, SOLUTION INTRAVENOUS at 14:59

## 2022-03-14 RX ADMIN — DEXTROSE AND SODIUM CHLORIDE: 5; 450 INJECTION, SOLUTION INTRAVENOUS at 00:39

## 2022-03-14 RX ADMIN — METRONIDAZOLE 500 MG: 500 INJECTION, SOLUTION INTRAVENOUS at 17:20

## 2022-03-14 RX ADMIN — SODIUM PHOSPHATE, MONOBASIC, MONOHYDRATE AND SODIUM PHOSPHATE, DIBASIC, ANHYDROUS 15 MMOL: 276; 142 INJECTION, SOLUTION INTRAVENOUS at 23:27

## 2022-03-14 RX ADMIN — LORAZEPAM 1 MG: 2 INJECTION INTRAMUSCULAR; INTRAVENOUS at 22:12

## 2022-03-14 RX ADMIN — ENOXAPARIN SODIUM 120 MG: 150 INJECTION SUBCUTANEOUS at 19:34

## 2022-03-14 RX ADMIN — POTASSIUM CHLORIDE 10 MEQ: 10 INJECTION, SOLUTION INTRAVENOUS at 20:52

## 2022-03-14 RX ADMIN — POTASSIUM CHLORIDE 10 MEQ: 10 INJECTION, SOLUTION INTRAVENOUS at 16:25

## 2022-03-14 RX ADMIN — IOPAMIDOL 75 ML: 755 INJECTION, SOLUTION INTRAVENOUS at 00:04

## 2022-03-14 RX ADMIN — DEXTROSE AND SODIUM CHLORIDE: 5; 450 INJECTION, SOLUTION INTRAVENOUS at 15:03

## 2022-03-14 RX ADMIN — SODIUM CHLORIDE 80 ML: 9 INJECTION, SOLUTION INTRAVENOUS at 00:04

## 2022-03-14 RX ADMIN — METOPROLOL TARTRATE 5 MG: 1 INJECTION, SOLUTION INTRAVENOUS at 16:30

## 2022-03-14 RX ADMIN — THIAMINE HYDROCHLORIDE 50 MG: 100 INJECTION, SOLUTION INTRAMUSCULAR; INTRAVENOUS at 08:00

## 2022-03-14 RX ADMIN — MAGNESIUM SULFATE HEPTAHYDRATE 1000 MG: 1 INJECTION, SOLUTION INTRAVENOUS at 14:59

## 2022-03-14 RX ADMIN — METRONIDAZOLE 500 MG: 500 INJECTION, SOLUTION INTRAVENOUS at 12:58

## 2022-03-14 RX ADMIN — VANCOMYCIN HYDROCHLORIDE 1500 MG: 1.5 INJECTION, POWDER, LYOPHILIZED, FOR SOLUTION INTRAVENOUS at 14:58

## 2022-03-14 RX ADMIN — POTASSIUM CHLORIDE 10 MEQ: 10 INJECTION, SOLUTION INTRAVENOUS at 01:47

## 2022-03-14 RX ADMIN — CEFEPIME HYDROCHLORIDE 2000 MG: 2 INJECTION, POWDER, FOR SOLUTION INTRAVENOUS at 01:06

## 2022-03-14 RX ADMIN — METOPROLOL TARTRATE 5 MG: 1 INJECTION, SOLUTION INTRAVENOUS at 01:52

## 2022-03-14 RX ADMIN — MAGNESIUM SULFATE HEPTAHYDRATE 1000 MG: 1 INJECTION, SOLUTION INTRAVENOUS at 12:59

## 2022-03-14 RX ADMIN — ACETAMINOPHEN 650 MG: 650 SUPPOSITORY RECTAL at 04:49

## 2022-03-14 RX ADMIN — THIAMINE HYDROCHLORIDE 50 MG: 100 INJECTION, SOLUTION INTRAMUSCULAR; INTRAVENOUS at 00:34

## 2022-03-14 RX ADMIN — CEFEPIME HYDROCHLORIDE 2000 MG: 2 INJECTION, POWDER, FOR SOLUTION INTRAVENOUS at 17:20

## 2022-03-14 RX ADMIN — SODIUM CHLORIDE, PRESERVATIVE FREE 10 ML: 5 INJECTION INTRAVENOUS at 08:10

## 2022-03-14 RX ADMIN — CEFEPIME HYDROCHLORIDE 2000 MG: 2 INJECTION, POWDER, FOR SOLUTION INTRAVENOUS at 11:24

## 2022-03-14 RX ADMIN — POTASSIUM CHLORIDE 10 MEQ: 10 INJECTION, SOLUTION INTRAVENOUS at 07:52

## 2022-03-14 RX ADMIN — ANTI-FUNGAL POWDER MICONAZOLE NITRATE TALC FREE: 1.42 POWDER TOPICAL at 08:10

## 2022-03-14 RX ADMIN — POTASSIUM CHLORIDE 10 MEQ: 10 INJECTION, SOLUTION INTRAVENOUS at 05:21

## 2022-03-14 RX ADMIN — DILTIAZEM HYDROCHLORIDE 15 MG/HR: 5 INJECTION, SOLUTION INTRAVENOUS at 14:00

## 2022-03-14 RX ADMIN — SODIUM PHOSPHATE, MONOBASIC, MONOHYDRATE AND SODIUM PHOSPHATE, DIBASIC, ANHYDROUS 10 MMOL: 276; 142 INJECTION, SOLUTION INTRAVENOUS at 05:22

## 2022-03-14 RX ADMIN — POTASSIUM CHLORIDE 10 MEQ: 10 INJECTION, SOLUTION INTRAVENOUS at 09:00

## 2022-03-14 RX ADMIN — SODIUM PHOSPHATE, MONOBASIC, MONOHYDRATE AND SODIUM PHOSPHATE, DIBASIC, ANHYDROUS 15 MMOL: 276; 142 INJECTION, SOLUTION INTRAVENOUS at 00:49

## 2022-03-14 RX ADMIN — FAMOTIDINE 20 MG: 10 INJECTION, SOLUTION INTRAVENOUS at 19:34

## 2022-03-14 RX ADMIN — SODIUM PHOSPHATE, MONOBASIC, MONOHYDRATE AND SODIUM PHOSPHATE, DIBASIC, ANHYDROUS 15 MMOL: 276; 142 INJECTION, SOLUTION INTRAVENOUS at 17:19

## 2022-03-14 ASSESSMENT — ENCOUNTER SYMPTOMS
CHEST TIGHTNESS: 0
SHORTNESS OF BREATH: 0
SORE THROAT: 0
VOMITING: 0
BACK PAIN: 0
ABDOMINAL PAIN: 0
COLOR CHANGE: 0
NAUSEA: 0
EYE REDNESS: 0
WHEEZING: 0

## 2022-03-14 ASSESSMENT — PAIN SCALES - GENERAL
PAINLEVEL_OUTOF10: 0

## 2022-03-14 NOTE — PROGRESS NOTES
[id number] [facility] Wound Ostomy Continence Nurse  Consult Note       NAME:  Brennen [facility] RECORD NUMBER:  [id number]  AGE: 42 y.o.    GENDER: female  : 1980  TODAY'S DATE:  3/14/2022    Subjective:      Ramesh Kendrick is a 42 y.o. female with inpatient referral to Wound Ostomy Continence Specialty for:  Groin/buttocks      Wound Identification:  Wound Type: pressure and MASD- ITD  Contributing Factors: chronic pressure, decreased mobility, shear force, obesity and incontinence of stool    Wound History: pt does not know how she got the buttocks wound  Current Wound Care Treatment:  Miconazole powder to groin, zinc paste to buttocks    Patient Goal of Care:  [x] Wound Healing  [] Odor Control  [] Palliative Care  [] Pain Control   [] Other:         PAST MEDICAL HISTORY        Diagnosis Date    Alcohol abuse     sober [id number]    Anxiety     Arthritis     Painting esophagus     Benzodiazepine overdose 2015    Bipolar disorder, unspecified ([address]     Bipolar I disorder, most recent episode depressed, severe without psychotic features ([address]     Cellulitis 2018    Chronic abdominal wound infection 2015    Constipation 9/3/2019    Depression 2015    Drug overdose, intentional ([address] 2015    Genital herpes, unspecified     GERD (gastroesophageal reflux disease)     History of pulmonary embolism     Hx of blood clots dx 2 years ago    clots in both legs and lungs     Hypertension     Iron deficiency anemia     Isopropyl alcohol poisoning 2014    Lumbosacral spondylosis without myelopathy     MDRO (multiple drug resistant organisms) resistance     MRSA (abd)    Miscarriage     multiple, around 7th mos pregnant each time d/t hypercoagulation    Morbid obesity ([address]     MRSA (methicillin resistant staph aureus) culture positive 02/10/2017    urine    Overdose of benzodiazepine     Pernicious anemia     Primary hypercoagulable state ([address]     antiphospholipid antibodies on Arixtra shots daily    Pulmonary embolism ([facility] 2010    Suicidal behavior 12/14/2015    Suicidal ideation     Suicide attempt ([facility] 2014    hx OD on painkillers and rubbing alcohol    SVT (supraventricular tachycardia) ([facility] 04/07/2017    Toxic effect of ethanol, intentional self-harm ([facility] 12/16/2015    Type 2 diabetes mellitus, with long-term current use of insulin ([facility]        PAST SURGICAL HISTORY    Past Surgical History:   Procedure Laterality Date    ABDOMINAL HERNIA REPAIR  2014    wound vac    ABDOMINAL HERNIA REPAIR  11/3/14    BARIATRIC SURGERY  2013    [facility]    CHOLECYSTECTOMY      DILATION AND CURETTAGE OF UTERUS  2005    ENDOSCOPY, COLON, DIAGNOSTIC      EGD    FINGER AMPUTATION Left 02/09/2015    index and ring finger. from [address] [place] [address]      total of 8    IVC FILTER INSERTION      LIVER RESECTION      for hepatic adenoma    LYMPH NODE BIOPSY  1990    JUSTINE AND BSO  2011    TONSILLECTOMY         FAMILY HISTORY    Family History   Problem Relation Age of Onset    Depression Mother     High Blood Pressure Mother     High Cholesterol Mother     Diabetes Father     Heart Disease Father     Kidney Disease Father     High Blood Pressure Father     High Cholesterol Father     Cancer Maternal Uncle         of duodenum had whipple    Breast Cancer Maternal Aunt     Breast Cancer Maternal Cousin        SOCIAL HISTORY    Social History     Tobacco Use    Smoking status: Never Smoker    Smokeless tobacco: Never Used   Substance Use Topics    Alcohol use: No     Alcohol/week: 0.0 standard drinks     Comment: Last alcohol use was in 12/2015    Drug use: No     Comment: Pt stole her mom's Benzo 1 yr ago. Pt said she took more than prescribed dose of Valium once in late 90's.          ALLERGIES    Allergies   Allergen Reactions   Abi Diss [Aspirin]      Patient is on chronic anticoagulation    Betadine [Povidone Iodine]     Celexa [Citalopram Hydrobromide]     Citalopram     Lasix [Furosemide]      Tolerates Bumex    Macrobid [Nitrofurantoin]     Norco [Hydrocodone-Acetaminophen] Hives    Betadine [Povidone Iodine] Rash    Codeine Rash    Lithium Nausea And Vomiting    Paroxetine Rash    Paxil [Paroxetine Hcl] Rash    Tape Kevan Huitron Tape] Rash     Paper tape    Tegretol [Carbamazepine] Rash       HOME MEDICATIONS  Prior to Admission medications    Medication Sig Start Date End Date Taking? Authorizing Provider   ondansetron (ZOFRAN ODT) 4 MG disintegrating tablet Take 1 tablet by mouth every 8 hours as needed for Nausea 2/1/22   Duane Menon DO   benazepril (LOTENSIN) 20 MG tablet TAKE 1 TABLET BY MOUTH DAILY 7/21/21   Rosas Paniagua MD   famotidine (PEPCID) 20 MG tablet TAKE 1 TABLET BY MOUTH TWICE DAILY 4/22/21   MD MISHA Saleem SOLOSTAR 300 UNIT/ML SOPN INJECT 70 UNITS INTO THE SKIN EVERY MORNING 3/30/21   Sandy Rogel MD   acyclovir (ZOVIRAX) 5 % ointment Apply topically every 3 hours x 10 days. 3/30/21   Rosas Paniagua MD   fondaparinux (ARIXTRA) 10 MG/0.8ML SOLN injection Inject 0.8 mLs into the skin daily 3/30/21   Rosas Paniagua MD   FLUoxetine (PROZAC) 20 MG capsule Take 1 capsule by mouth daily    Historical Provider, MD   cyanocobalamin 1000 MCG/ML injection Inject 1 mL into the muscle every 7 days 2/13/21   Rosas Paniagua MD   Syringe/Needle, Disp, (SYRINGE 3CC/25GX1\") 25G X 1\" 3 ML MISC To be used with B12 injections monthly 2/13/21   Rosas Paniagua MD   tiZANidine (ZANAFLEX) 4 MG tablet TAKE 1 TABLET BY MOUTH TWICE DAILY AS NEEDED FOR BACK PAIN 12/27/20   Rosas Paniagua MD   FARXIGA 10 MG tablet TAKE 1 TABLET BY MOUTH EVERY MORNING 11/2/20   Rosas Paniagua MD   pregabalin (LYRICA) 50 MG capsule Take 1 capsule by mouth 3 times daily for 7 days.  10/6/20 10/13/20  Sandy Rogel MD   sucralfate (CARAFATE) 1 GM tablet DISSOLVE 1 TABLET INTO 15 ML( 1 TABLESPOON) OF WATER AND DRINK BY MOUTH FOUR TIMES DAILY AS NEEDED FOR GERD 7/27/20   May Barron MD   dilTIAZem (DILTIAZEM CD) 240 MG extended release capsule Take 240 mg by mouth daily Take one capsule by mouth daily. Historical Provider, MD   pravastatin (PRAVACHOL) 40 MG tablet TAKE 1 TABLET(40 MG) BY MOUTH DAILY 6/26/20   May Barron MD   valACYclovir (VALTREX) 500 MG tablet Take 1 tablet by mouth daily 6/8/20   May Barron MD   ondansetron (ZOFRAN) 4 MG tablet Take 1 tablet by mouth 3 times daily as needed for Nausea or Vomiting 4/27/20   Edmundo Gonzalez MD   acetaminophen (TYLENOL) 500 MG tablet Take 2 tablets by mouth every 8 hours as needed for Pain 2/19/20   Ayana Booker, DO   LORazepam (ATIVAN) 2 MG tablet  12/18/19   Historical Provider, MD   GLUCAGEN HYPOKIT 1 MG SOLR injection  12/19/19   Historical Provider, MD   glucose monitoring kit (FREESTYLE) monitoring kit Testing twice a day. Please dispense according to patients insurance. 12/20/19   May Barron MD   Insulin Pen Needle 31G X 5 MM MISC 1 each by Does not apply route daily 12/20/19   May Barron MD   Lancets 30G MISC Testing twice a day. Please dispense according to patients insurance. 12/20/19   May Barron MD   blood glucose monitor strips Testing twice a day. Please dispense according to patients insurance. 12/20/19   May Barron MD   KLOR-CON M10 10 MEQ extended release tablet Take 2 tablets by mouth daily as needed (take with bumex) 12/20/19   May Barron MD   bumetanide (BUMEX) 0.5 MG tablet TAKE 1 TABLET BY MOUTH DAILY AS NEEDED FOR LEG SWELLING 12/20/19   May Barron MD   lamoTRIgine (LAMICTAL) 200 MG tablet Take 200 mg by mouth daily    Historical Provider, MD   amphetamine-dextroamphetamine (ADDERALL, 30MG,) 30 MG tablet Take 30 mg by mouth daily.     Historical Provider, MD   dicyclomine (BENTYL) 10 MG capsule Take 1 capsule by mouth every 6 hours as needed (cramps) 11/3/19 Oral Daily Ellen Spangler MD   20 mg at 03/13/22 1715    pravastatin (PRAVACHOL) tablet 40 mg  40 mg Oral Daily Ellen Spangler MD   40 mg at 03/13/22 1715    [Held by provider] lisinopril (PRINIVIL;ZESTRIL) tablet 20 mg  20 mg Oral Daily Ellen Spangler MD   20 mg at 03/13/22 1715    dilTIAZem (CARDIZEM CD) extended release capsule 240 mg  240 mg Oral Daily Ellen Spangler MD   240 mg at 03/13/22 1715    cefepime (MAXIPIME) 2000 mg IVPB minibag  2,000 mg IntraVENous Q8H Sandra Martínez  mL/hr at 03/14/22 1720 2,000 mg at 03/14/22 1720    vancomycin (VANCOCIN) intermittent dosing (placeholder)   Other RX Placeholder Sandra Martínez MD        insulin glargine (LANTUS) injection vial 20 Units  20 Units SubCUTAneous Nightly Sandra Martínez MD        sodium chloride flush 0.9 % injection 10 mL  10 mL IntraVENous PRN Sandra Martínez MD   10 mL at 03/14/22 0810    glucose (GLUTOSE) 40 % oral gel 15 g  15 g Oral PRN Inocencio Magallanes, DO        dextrose 50 % IV solution  12.5 g IntraVENous PRN Inocencio Magallanes, DO        glucagon (rDNA) injection 1 mg  1 mg IntraMUSCular PRN Inocencio Magallanes, DO        dextrose 5 % solution  100 mL/hr IntraVENous PRN Inocencio Magallanes, DO        insulin regular (HUMULIN R;NOVOLIN R) 100 Units in sodium chloride 0.9 % 100 mL infusion  0.1 Units/kg/hr IntraVENous Continuous Munir Magallanes, DO 3.9 mL/hr at 03/14/22 1715 3.9 Units/hr at 03/14/22 1715    0.9% NaCl with KCl 20 mEq infusion   IntraVENous Continuous Inocencio Magallanes DO   Stopped at 03/13/22 1900    potassium chloride 10 mEq/100 mL IVPB (Peripheral Line)  10 mEq IntraVENous PRN Yanely Padron  mL/hr at 03/14/22 1625 10 mEq at 03/14/22 1625    magnesium sulfate 1000 mg in dextrose 5% 100 mL IVPB  1,000 mg IntraVENous PRN Sam Cotto MD   Stopped at 03/14/22 1559    sodium phosphate 10 mmol in dextrose 5 % 250 mL IVPB  10 mmol IntraVENous PRN Yanely Padron MD   Stopped at 03/14/22 0626    Or    sodium phosphate 15 mmol in dextrose 5 % 250 mL IVPB  15 mmol IntraVENous PRN Carla Gr MD 62.5 mL/hr at 03/14/22 1719 15 mmol at 03/14/22 1719    Or    sodium phosphate 20 mmol in dextrose 5 % 500 mL IVPB  20 mmol IntraVENous PRN Carla Gr MD   Stopped at 03/13/22 1330    polyethylene glycol (GLYCOLAX) packet 17 g  17 g Oral Daily PRN Carla Gr MD        0.9 % sodium chloride infusion   IntraVENous Continuous Carla Gr MD        dextrose 5 % and 0.45 % sodium chloride infusion   IntraVENous Continuous PRN Carla Gr  mL/hr at 03/14/22 1503 New Bag at 03/14/22 1503    sodium chloride flush 0.9 % injection 5-40 mL  5-40 mL IntraVENous 2 times per day Carla Gr MD        sodium chloride flush 0.9 % injection 5-40 mL  5-40 mL IntraVENous PRN Carla Gr MD        0.9 % sodium chloride infusion  25 mL IntraVENous PRN Carla Gr MD        ondansetron (ZOFRAN-ODT) disintegrating tablet 4 mg  4 mg Oral Q8H PRN Carla Gr MD        Or    ondansetron (ZOFRAN) injection 4 mg  4 mg IntraVENous Q6H PRN Carla Gr MD   4 mg at 03/13/22 1801    acetaminophen (TYLENOL) tablet 650 mg  650 mg Oral Q6H PRN Carla Gr MD        Or    acetaminophen (TYLENOL) suppository 650 mg  650 mg Rectal Q6H PRN Carla Gr MD   650 mg at 03/14/22 0449    famotidine (PEPCID) injection 20 mg  20 mg IntraVENous BID Jhonny Hairston MD   20 mg at 03/14/22 0809    LORazepam (ATIVAN) tablet 1 mg  1 mg Oral Q1H PRN Zoey Mendiola MD        Or    LORazepam (ATIVAN) injection 1 mg  1 mg IntraVENous Q1H PRN Zoey Mendiola MD        Or    LORazepam (ATIVAN) tablet 2 mg  2 mg Oral Q1H PRN Zoey Mendiola MD        Or    LORazepam (ATIVAN) injection 2 mg  2 mg IntraVENous Q1H PRN Zoey Mendiola MD   2 mg at 03/12/22 2143    Or    LORazepam (ATIVAN) tablet 3 mg  3 mg Oral Q1H PRN Zoey Mendiola MD        Or    LORazepam (ATIVAN) injection 3 mg  3 mg IntraVENous Q1H PRN Zoey Mendiola MD        Or    LORazepam (ATIVAN) tablet 4 mg  4 mg Oral Q1H PRN Conseulo Moore MD        Or    LORazepam (ATIVAN) injection 4 mg  4 mg IntraVENous Q1H PRN Consuelo Moore MD   4 mg at 03/12/22 9601    sodium chloride flush 0.9 % injection 5-40 mL  5-40 mL IntraVENous 2 times per day Consuelo Moore MD           Review of Systems      Objective:      BP (!) 129/57   Pulse 105   Temp 99.1 °F (37.3 °C) (Axillary)   Resp 23   Ht 5' 8\" (1.727 m)   Wt 280 lb (127 kg)   SpO2 90%   BMI 42.57 kg/m²       LABS    CBC:   Lab Results   Component Value Date    WBC 11.0 03/14/2022    RBC 4.42 03/14/2022    RBC 4.43 06/03/2012    HGB 13.4 03/14/2022     SED RATE:   Lab Results   Component Value Date    SEDRATE 32 (H) 12/01/2013       CMP:  Albumin:    Lab Results   Component Value Date    LABALBU 2.1 03/13/2022    LABALBU 3.9 06/03/2012     PT/INR:    Lab Results   Component Value Date    PROTIME 14.8 03/13/2022    PROTIME 15.3 12/20/2013    INR 1.2 03/13/2022     HgBA1c:    Lab Results   Component Value Date    LABA1C 7.8 05/04/2021     PTT: No components found for: LABPTT      Assessment:     Physical Exam      Calixto Risk Score: Calixto Scale Score: 11    Patient Active Problem List   Diagnosis Code    Pernicious anemia D51.0    History of ulcer disease Z87.898    History of DVT of lower extremity Z86.718    Primary hypercoagulable state ([facility] D68.59    Amputation finger S68.119A    GERD (gastroesophageal reflux disease) K21.9    Alcohol dependence in remission ([facility] F10.21    Type 2 diabetes mellitus with diabetic polyneuropathy, with long-term current use of insulin (HCC) E11.42, Z79.4    Primary insomnia F51.01    Essential hypertension I10    Bipolar affective disorder in remission ([facility] F31.70    History of pulmonary embolism Z86.711    Antiphospholipid syndrome (HCC) D68.61    Alcohol intoxication (HCC) F10.929    Hypertriglyceridemia E78.1    Chronic post-traumatic stress disorder (PTSD) F43.12    OCD (obsessive compulsive disorder) F42.9    Severe benzodiazepine use disorder (HCC) F13.20    Morbid obesity with BMI of 50.0-59.9, adult (HCC) E66.01, Z68.43    History of MRSA infection Z86.14    Pain of upper abdomen R10.10    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    NAFLD (nonalcoholic fatty liver disease) K76.0    Nondependent opioid abuse in remission ([facility] F11.11    Osteopenia M85.80    History of Awa-en-Y gastric bypass Z98.84    Herpes genitalis A60.00    History of drug abuse ([facility]) F19.11    Malabsorption K90.9    History of pulmonary embolus (PE) Z86.711    Vitamin B 12 deficiency E53.8    Vitamin D deficiency E55.9    History of surgery of liver Z98.890    Blood alkaline phosphatase increased compared with prior measurement R74.8    Paroxysmal SVT (supraventricular tachycardia) ([facility]) I47.1    Anxiety F41.9    Hypomagnesemia E83.42    Chronic idiopathic constipation K59.04    Recurrent incisional hernia K43.2    History of small bowel obstruction Z87.19    History of peptic ulcer Z87.11    Fibromyalgia M79.7    Polyneuropathy G62.9    COVID-19 virus infection U07.1    DKA, type 2, not at goal [facility]) E11.10    GAIL (acute kidney injury) ([facility] N17.9    Acute pancreatitis K85.90    Multifocal pneumonia L56.9    Alcoholic hepatitis V06.08         Measurements:  Wound 03/14/22 Buttocks Left;Right (Active)   Wound Image   03/14/22 1746   Wound Etiology Deep tissue/Injury 03/14/22 1746   Dressing Status New dressing applied; Old drainage noted;New drainage noted 03/14/22 1746   Wound Cleansed Soap and water 03/14/22 1746   Dressing/Treatment Pharmaceutical agent (see MAR) 03/14/22 1746   Wound Assessment Purple/maroon;Ruptured blister 03/14/22 1746   Drainage Amount Small 03/14/22 1746   Drainage Description Serosanguinous 03/14/22 1746   Odor None 03/14/22 1746   Valerie-wound Assessment Blanchable erythema;Dry/flaky 03/14/22 1746   Margins Attached edges 03/14/22 1746   Number of days: 0       WOUND DESCRIPTION:   [facility] nurse consult for buttocks wound, denudation of groin folds. Patient was admitted on 3/12 alcohol intoxication and DKA. Primary RN states that patient has denudation of the abdominal and groin folds, as well as a wound to the buttocks. Upon assessment abdomen and groin folds moderately denuded and painful. Patient has a fluid-filled blister to the left abdominal fold. Area around Singh catheter is very denuded. It appears that the catheter has been leaking a little and patient states that it feels like the catheter is being pulled. Catheter was repositioned and patient washed up. Recommend miconazole powder, applied per order. Use of Interdry not successful. Patient also has linear area of dark purple/maroon discoloration to her left buttock. There is a deflated blister toward the gluteal cleft. There is also some dark purple/maroon discoloration to the right buttock. Patient does not know how this wound occurred. Upon admission it was charted that pt had some excoriation, redness, and open areas to the perineal area. Recommend triad cream twice daily and as needed. Response to treatment:  Well tolerated by patient. Plan:     Plan of Care:     Buttocks: cleanse gently with foam cleanser, pat dry. Apply Triad cream twice daily and as needed    -Turn every 2 hours    -Float heels off of bed with pillows under calves. If needed- use offloading boots.      -Routine incontinence care with foaming cleanser and zinc oxide cream. Apply zinc oxide cream twice daily and prn incontinence. Use moisture wicking under pad (one layer only). -Waffle mattress overlay. Check inflation each shift by sliding hand under the air overlay. Feel for the patient's heaviest/ most dependant body part. Ideally 1/2 to 1\" of air will be between your hand and the patient's body. Add air prn.     Specialty Bed Required : Yes   [] Low Air Loss   [x] Pressure Redistribution  [] Fluid Immersion  [] Bariatric  [] Total Pressure Relief  [] Other:     Current Diet: ADULT DIET;  Clear Liquid; 4 carb choices (60 gm/meal)  Dietician consult:  N/A    Discharge Plan:  Placement for patient upon discharge: TBD  Patient appropriate for Outpatient [address]: N/A    Patient/Caregiver Teaching:  Level of patientunderstanding able to:     [x] Indicates understanding       [] Needs reinforcement  [] Unsuccessful      [x] Verbal Understanding  [] Demonstrated understanding       [] No evidence of learning  [] Refused teaching         [] N/A       Electronically signed by Octavia Godinez RN on  3/14/2022 at 5:48 PM

## 2022-03-14 NOTE — CONSULTS
[facility] CONSULT NOTE      Date of Admission:  3/12/2022    Date of Consultation:  3/14/2022    PCP:  Jalen RODRIGUEZ      REASON FOR CONSULT:  SVT, Tachycardia. HISTORY OF PRESENT ILLNESS:  Flaquita Marquez is a 42 y.o. female   With prior history of alcohol abuse who was admitted with fever chills generalized sickness and was told pneumonia. We are asked to see in consultation as she had tachycardia overnight. Overnight patient was started on IV diltiazem drip which is ongoing at the time of my evaluation this morning in the ICU. At the time of my evaluation patient is drowsy but arousable and denies any anginal chest pain or palpitation or lightheadedness or dizziness. Most of the history was obtained from the patient's nurse at bedside and from review of the chart. Patient denies any drug abuse. No cigarette smoking. No prior ASCAD or angina or heart failure or hypertension or hyperlipidemia. History of psychiatric illness documented. Please refer to admitting history and physical and other providers notes for further details.        PMH:   has a past medical history of Alcohol abuse, Anxiety, Arthritis, Painting esophagus, Benzodiazepine overdose, Bipolar disorder, unspecified ([address] Bipolar I disorder, most recent episode depressed, severe without psychotic features ([address] Cellulitis, Chronic abdominal wound infection, Constipation, Depression, Drug overdose, intentional ([address] Genital herpes, unspecified, GERD (gastroesophageal reflux disease), History of pulmonary embolism, Hx of blood clots, Hypertension, Iron deficiency anemia, Isopropyl alcohol poisoning, Lumbosacral spondylosis without myelopathy, MDRO (multiple drug resistant organisms) resistance, Miscarriage, Morbid obesity ([address] MRSA (methicillin resistant staph aureus) culture positive, Overdose of benzodiazepine, Pernicious anemia, Primary hypercoagulable state ([address] Pulmonary embolism ([facility] Suicidal behavior, Suicidal ideation, Suicide attempt ([facility] SVT (supraventricular tachycardia) ([facility] [address] Toxic effect of ethanol, intentional self-harm ([facility] [address] and Type 2 diabetes mellitus, with long-term current use of insulin ([facility] [address] PSH:   has a past surgical history that includes Cholecystectomy; Liver Resection; hernia repair; Tonsillectomy; Endoscopy, colon, diagnostic; Abdominal hernia repair (2014); Abdominal hernia repair (11/3/14); Bariatric Surgery (2013); Dilation and curettage of uterus (2005); Finger amputation (Left, 02/09/2015); lymph node biopsy (1990); yariel and bso (cervix removed) (2011); and IVC filter insertion. Allergies: Allergies   Allergen Reactions    Asa [Aspirin]      Patient is on chronic anticoagulation    Betadine [Povidone Iodine]     Celexa [Citalopram Hydrobromide]     Citalopram     Lasix [Furosemide]      Tolerates Bumex    Macrobid [Nitrofurantoin]     Norco [Hydrocodone-Acetaminophen] Hives    Betadine [Povidone Iodine] Rash    Codeine Rash    Lithium Nausea And Vomiting    Paroxetine Rash    Paxil [Paroxetine Hcl] Rash    Tape [Adhesive Tape] Rash     Paper tape    Tegretol [Carbamazepine] Rash        Home Meds:    Prior to Admission medications    Medication Sig Start Date End Date Taking? Authorizing Provider   ondansetron (ZOFRAN ODT) 4 MG disintegrating tablet Take 1 tablet by mouth every 8 hours as needed for Nausea 2/1/22   Malka Menon DO   benazepril (LOTENSIN) 20 MG tablet TAKE 1 TABLET BY MOUTH DAILY 7/21/21   Fernandez Hansen MD   famotidine (PEPCID) 20 MG tablet TAKE 1 TABLET BY MOUTH TWICE DAILY 4/22/21   MD MISHA SaleemOSTAR 300 UNIT/ML SOPN INJECT 70 UNITS INTO THE SKIN EVERY MORNING 3/30/21   Sandy Rogel MD   acyclovir (ZOVIRAX) 5 % ointment Apply topically every 3 hours x 10 days.  3/30/21   Fernandez Hansen MD   fondaparinux (ARIXTRA) 10 MG/0.8ML SOLN injection Inject 0.8 mLs into the skin daily 3/30/21   Stan Calvo MD   FLUoxetine (PROZAC) 20 MG capsule Take 1 capsule by mouth daily    Historical Provider, MD   cyanocobalamin 1000 MCG/ML injection Inject 1 mL into the muscle every 7 days 2/13/21   Stan Calvo MD   Syringe/Needle, Disp, (SYRINGE 3CC/25GX1\") 25G X 1\" 3 ML MISC To be used with B12 injections monthly 2/13/21   Stan Calvo MD   tiZANidine (ZANAFLEX) 4 MG tablet TAKE 1 TABLET BY MOUTH TWICE DAILY AS NEEDED FOR BACK PAIN 12/27/20   Stan Calvo MD   FARXIGA 10 MG tablet TAKE 1 TABLET BY MOUTH EVERY MORNING 11/2/20   Stan Calvo MD   pregabalin (LYRICA) 50 MG capsule Take 1 capsule by mouth 3 times daily for 7 days. 10/6/20 10/13/20  Sandy Rogel MD   sucralfate (CARAFATE) 1 GM tablet DISSOLVE 1 TABLET INTO 15 ML( 1 TABLESPOON) OF WATER AND DRINK BY MOUTH FOUR TIMES DAILY AS NEEDED FOR GERD 7/27/20   Stan Calvo MD   dilTIAZem (DILTIAZEM CD) 240 MG extended release capsule Take 240 mg by mouth daily Take one capsule by mouth daily. Historical Provider, MD   pravastatin (PRAVACHOL) 40 MG tablet TAKE 1 TABLET(40 MG) BY MOUTH DAILY 6/26/20   Stan Calvo MD   valACYclovir (VALTREX) 500 MG tablet Take 1 tablet by mouth daily 6/8/20   Stan Calvo MD   ondansetron (ZOFRAN) 4 MG tablet Take 1 tablet by mouth 3 times daily as needed for Nausea or Vomiting 4/27/20   Nba Wise MD   acetaminophen (TYLENOL) 500 MG tablet Take 2 tablets by mouth every 8 hours as needed for Pain 2/19/20   Ayana Carter,    LORazepam (ATIVAN) 2 MG tablet  12/18/19   Historical Provider, MD   GLUCAGEN HYPOKIT 1 MG SOLR injection  12/19/19   Historical Provider, MD   glucose monitoring kit (FREESTYLE) monitoring kit Testing twice a day. Please dispense according to patients insurance.  12/20/19   Stan Calvo MD   Insulin Pen Needle 31G X 5 MM MISC 1 each by Does not apply route daily 12/20/19   Stan Calvo MD   Lancets 30G MISC Testing twice a day. Please dispense according to patients insurance. 12/20/19   Adonis Huitron MD   blood glucose monitor strips Testing twice a day. Please dispense according to patients insurance. 12/20/19   Adonis Huitron MD   KLOR-CON M10 10 MEQ extended release tablet Take 2 tablets by mouth daily as needed (take with bumex) 12/20/19   Adonis Huitron MD   bumetanide (BUMEX) 0.5 MG tablet TAKE 1 TABLET BY MOUTH DAILY AS NEEDED FOR LEG SWELLING 12/20/19   Adonis Huitron MD   lamoTRIgine (LAMICTAL) 200 MG tablet Take 200 mg by mouth daily    Historical Provider, MD   amphetamine-dextroamphetamine (ADDERALL, 30MG,) 30 MG tablet Take 30 mg by mouth daily.     Historical Provider, MD   dicyclomine (BENTYL) 10 MG capsule Take 1 capsule by mouth every 6 hours as needed (cramps) 11/3/19   Shira Lubin MD   QUEtiapine (SEROQUEL) 100 MG tablet Take 50 mg by mouth nightly as needed     Historical Provider, MD   Multiple Vitamins-Minerals (THERAPEUTIC MULTIVITAMIN-MINERALS) tablet Take 1 tablet by mouth daily    Historical Provider, MD   buPROPion (WELLBUTRIN XL) 300 MG extended release tablet Take 300 mg by mouth every morning    Historical Provider, MD   vitamin D (CHOLECALCIFEROL) 1000 UNIT TABS tablet Take 10,000 Units by mouth daily 5 days a week    Historical Provider, MD        [facility] Meds:    Current Facility-Administered Medications   Medication Dose Route Frequency Provider Last Rate Last Admin    dilTIAZem 125 mg in dextrose 5 % 125 mL infusion  5-15 mg/hr IntraVENous Continuous BERNARDA Parr - CNP 15 mL/hr at 03/14/22 0720 15 mg/hr at 03/14/22 0720    perflutren lipid microspheres (DEFINITY) injection 1.65 mg  1.5 mL IntraVENous ONCE PRN Lida Monsalve MD        vancomycin (VANCOCIN) 1,500 mg in dextrose 5 % 250 mL IVPB (ADDAVIAL)  1,500 mg IntraVENous Q18H Bonnie Fragoso MD        metronidazole (FLAGYL) 500 mg in NaCl 100 mL IVPB premix  500 mg IntraVENous Q8H Mutasem Carole Choi MD        thiamine (B-1) injection 50 mg  50 mg IntraVENous Q8H Ramana Bedoya MD   50 mg at 03/14/22 0034    miconazole (MICOTIN) 2 % powder   Topical BID Kyle Blackburn MD   Given at 03/14/22 0810    metoprolol (LOPRESSOR) injection 5 mg  5 mg IntraVENous Q6H PRN Kyle Blackburn MD   5 mg at 03/14/22 0809    lamoTRIgine (LAMICTAL) tablet 200 mg  200 mg Oral Daily Gema Valencia MD   200 mg at 03/13/22 1715    pregabalin (LYRICA) capsule 50 mg  50 mg Oral TID Gema Valencia MD   50 mg at 03/13/22 1715    buPROPion (WELLBUTRIN XL) extended release tablet 300 mg  300 mg Oral QAM Gema Valencia MD        FLUoxetine (PROZAC) capsule 20 mg  20 mg Oral Daily Gema Valencia MD   20 mg at 03/13/22 1715    pravastatin (PRAVACHOL) tablet 40 mg  40 mg Oral Daily Gema Valencia MD   40 mg at 03/13/22 1715    [Held by provider] lisinopril (PRINIVIL;ZESTRIL) tablet 20 mg  20 mg Oral Daily Gema Valencia MD   20 mg at 03/13/22 1715    dilTIAZem (CARDIZEM CD) extended release capsule 240 mg  240 mg Oral Daily Gema Valencia MD   240 mg at 03/13/22 1715    cefepime (MAXIPIME) 2000 mg IVPB minibag  2,000 mg IntraVENous Q8H Kyle Blackburn MD   Stopped at 03/14/22 0136    vancomycin (VANCOCIN) intermittent dosing (placeholder)   Other RX Placeholder Klye Blackburn MD        insulin glargine (LANTUS) injection vial 20 Units  20 Units SubCUTAneous Nightly Kyle Blackburn MD        sodium chloride flush 0.9 % injection 10 mL  10 mL IntraVENous PRN Kyle Blackburn MD   10 mL at 03/14/22 0810    glucose (GLUTOSE) 40 % oral gel 15 g  15 g Oral PRN Lovenia Render [place], DO        dextrose 50 % IV solution  12.5 g IntraVENous PRN Lovenia Render Elpidio, DO        glucagon (rDNA) injection 1 mg  1 mg IntraMUSCular PRN Lovenia Render Elpidio, DO        dextrose 5 % solution  100 mL/hr IntraVENous PRN Tunde Magallanes, DO        insulin regular (HUMULIN R;NOVOLIN R) 100 Units in sodium chloride 0.9 % 100 mL infusion  0.1 Units/kg/hr IntraVENous Continuous [facility], DO 0.9 mL/hr at 03/14/22 0922 0.88 Units/hr at 03/14/22 2465    0.9% NaCl with KCl 20 mEq infusion   IntraVENous Continuous Nakul Magallanes, DO   Stopped at 03/13/22 1900    potassium chloride 10 mEq/100 mL IVPB (Peripheral Line)  10 mEq IntraVENous PRN Silvia Marcano  mL/hr at 03/14/22 0900 10 mEq at 03/14/22 0900    magnesium sulfate 1000 mg in dextrose 5% 100 mL IVPB  1,000 mg IntraVENous PRN Silvia Marcano MD   Stopped at 03/12/22 2019    sodium phosphate 10 mmol in dextrose 5 % 250 mL IVPB  10 mmol IntraVENous PRN Silvia Marcano MD 62.5 mL/hr at 03/14/22 0522 10 mmol at 03/14/22 0522    Or    sodium phosphate 15 mmol in dextrose 5 % 250 mL IVPB  15 mmol IntraVENous PRN Silvia Marcano MD   Stopped at 03/14/22 0449    Or    sodium phosphate 20 mmol in dextrose 5 % 500 mL IVPB  20 mmol IntraVENous PRN Silvia Marcano MD   Stopped at 03/13/22 1330    polyethylene glycol (GLYCOLAX) packet 17 g  17 g Oral Daily PRN Silvia Marcano MD        0.9 % sodium chloride infusion   IntraVENous Continuous Silvia Marcano MD        dextrose 5 % and 0.45 % sodium chloride infusion   IntraVENous Continuous PRN Silvia Marcano  mL/hr at 03/14/22 0804 New Bag at 03/14/22 0804    sodium chloride flush 0.9 % injection 5-40 mL  5-40 mL IntraVENous 2 times per day Silvia Marcano MD        sodium chloride flush 0.9 % injection 5-40 mL  5-40 mL IntraVENous PRN Silvia Marcano MD        0.9 % sodium chloride infusion  25 mL IntraVENous PRN Silvia Marcano MD        enoxaparin (LOVENOX) injection 30 mg  30 mg SubCUTAneous BID Silvia Marcano MD   30 mg at 03/14/22 0809    ondansetron (ZOFRAN-ODT) disintegrating tablet 4 mg  4 mg Oral Q8H PRN Silvia Marcano MD        Or    ondansetron (ZOFRAN) injection 4 mg  4 mg IntraVENous Q6H PRN Silvia Marcano MD   4 mg at 03/13/22 1801    acetaminophen (TYLENOL) tablet 650 mg  650 mg Oral Q6H PRN Silvia Marcano MD        Or    acetaminophen (TYLENOL) suppository 650 mg  650 mg Rectal Q6H PRN Martin Lacy MD   650 mg at 03/14/22 0449    famotidine (PEPCID) injection 20 mg  20 mg IntraVENous BID Kulwinder Mendoza MD   20 mg at 03/14/22 0809    LORazepam (ATIVAN) tablet 1 mg  1 mg Oral Q1H PRN Yasmine Tena MD        Or    LORazepam (ATIVAN) injection 1 mg  1 mg IntraVENous Q1H PRN Yasmine Tena MD        Or    LORazepam (ATIVAN) tablet 2 mg  2 mg Oral Q1H PRN Yasmine Tena MD        Or    LORazepam (ATIVAN) injection 2 mg  2 mg IntraVENous Q1H PRN Yasmine Tena MD   2 mg at 03/12/22 2143    Or    LORazepam (ATIVAN) tablet 3 mg  3 mg Oral Q1H PRN Yasmine Tena MD        Or    LORazepam (ATIVAN) injection 3 mg  3 mg IntraVENous Q1H PRN Yasmine Tena MD        Or    LORazepam (ATIVAN) tablet 4 mg  4 mg Oral Q1H WESN Yasmine Tena MD        Or    LORazepam (ATIVAN) injection 4 mg  4 mg IntraVENous Q1H PRN Yasmine Tena MD   4 mg at 03/12/22 3411    sodium chloride flush 0.9 % injection 5-40 mL  5-40 mL IntraVENous 2 times per day Yasmine Tena MD           Social History:    Social History     Socioeconomic History    Marital status:      Spouse name: None    Number of children: 1    Years of education: 3 years of college    Highest education level: None   Occupational History    Occupation: infant care     Comment: last worked 2008   Tobacco Use    Smoking status: Never Smoker    Smokeless tobacco: Never Used   Substance and Sexual Activity    Alcohol use: No     Alcohol/week: 0.0 standard drinks     Comment: Last alcohol use was in 12/2015    Drug use: No     Comment: Pt stole her mom's Benzo 1 yr ago. Pt said she took more than prescribed dose of Valium once in late 90's.     Sexual activity: Yes     Partners: Male   Other Topics Concern    None   Social History Narrative    Lives with Wicho Godfrey ex  and daughter          Social Determinants of Health     Financial Resource Strain:     Difficulty of Paying Living Expenses: Not on file Food Insecurity:     Worried About Running Out of Food in the Last Year: Not on file    Geronimo of Food in the Last Year: Not on file   Transportation Needs:     Lack of Transportation (Medical): Not on file    Lack of Transportation (Non-Medical): Not on file   Physical Activity:     Days of Exercise per Week: Not on file    Minutes of Exercise per Session: Not on file   Stress:     Feeling of Stress : Not on file   Social Connections:     Frequency of Communication with Friends and Family: Not on file    Frequency of Social Gatherings with Friends and Family: Not on file    Attends [employer] Services: Not on file    Active Member of [address] [place] [address] [employer] or Organizations: Not on file    Attends Club or Organization Meetings: Not on file    Marital Status: Not on file   Intimate Partner Violence:     Fear of Current or Ex-Partner: Not on file    Emotionally Abused: Not on file    Physically Abused: Not on file    Sexually Abused: Not on file   Housing Stability:     Unable to Pay for Housing in the Last Year: Not on file    Number of [place] in the Last Year: Not on file    Unstable Housing in the Last Year: Not on file       Family History:    Family History   Problem Relation Age of Onset    Depression Mother     High Blood Pressure Mother     High Cholesterol Mother     Diabetes Father     Heart Disease Father     Kidney Disease Father     High Blood Pressure Father     High Cholesterol Father     Cancer Maternal Uncle         of duodenum had whipple    Breast Cancer Maternal Aunt     Breast Cancer Maternal Cousin        Review of Systems:      ·   As charted. ·   · Please refer to admitting history and physical and other providers notes for review of systems as patient is drowsy at the time of my evaluation and history is not reliable currently. Will obtain more history when this patient is more awake and alert.        Physical Exam   Vital Signs: BP (!) 83/45   Pulse 152   Temp 101.4 °F (38.6 °C) (Axillary)   Resp 29   Ht 5' 8\" (1.727 m)   Wt 280 lb (127 kg)   SpO2 93%   BMI 42.57 kg/m²  O2 Flow Rate (L/min): 2 L/min     Admission Weight: 280 lb (127 kg)     General appearance:   Drowsy. Obese. Head: Normocephalic. Neck: no JVD, no carotid bruits, no thyromegaly. Chest:   Diminished air entry bilaterally. No chest wall tenderness. No wheezing. Cardiac:  Tachycardic. Regular rate and rhythm, no S3 or S4 gallop, no murmur or rubs or clicks. Abdomen: Soft, non-tender. Extremities: no cyanosis or clubbing or leg edema. No calf tenderness. Pulses:  Bilaterally symmetrical and intact radial pulses. Neurologic:  Able to move all 4 extremities. Skin:  No rashes. Warm and dry. Telemetric rhythm strips reviewed. Transient episodes of atrial fibrillation with rapid ventricular rate and most likely atrial flutter with RVR. Currently sinus tachycardia at the time of my evaluation. Systolic blood pressure around 100-120 range.     EKG 3/14/2022:     Sinus tachycardia   Otherwise normal ECG   When compared with ECG of 12-MAR-2022 08:54, (unconfirmed)   QRS duration has decreased   ST no longer elevated in Anterior leads      Labs:      CBC:   Recent Labs     03/12/22  0749 03/14/22  0514   WBC 8.1 11.0   HGB 15.5 13.4   HCT 50.7* 39.9   .2* 90.2    104*     BMP:   Recent Labs     03/13/22 2245 03/14/22  0403 03/14/22  0808   * 132* 134*   K 3.8 3.9 3.7    105 107   CO2 15* 16* 17*   PHOS 1.5* 2.5* 2.2*   BUN 25* 21* 19   CREATININE 1.03* 0.96* 0.96*     PT/INR:   Recent Labs     03/12/22  0749 03/13/22  1851   PROTIME 15.6* 14.8*   INR 1.2 1.2     APTT:   Recent Labs     03/12/22  0749   APTT 35.1     MAG:   Recent Labs     03/13/22 2245 03/14/22  0403 03/14/22  0808   MG 1.8 1.8 1.8       Recent Results (from the past 24 hour(s))   POC Glucose Fingerstick    Collection Time: 03/13/22 11:02 AM   Result Value Ref Range    POC Glucose 184 (H) 65 - 105 mg/dL   POC Glucose Fingerstick    Collection Time: 03/13/22 11:54 AM   Result Value Ref Range    POC Glucose 243 (H) 65 - 105 mg/dL   POC Glucose Fingerstick    Collection Time: 03/13/22  1:54 PM   Result Value Ref Range    POC Glucose 116 (H) 65 - 105 mg/dL   Giardia / Cryptosporidum antigens    Collection Time: 03/13/22  2:08 PM   Result Value Ref Range    Specimen Description . FECES     Direct Exam Giardia Antigen Assay Negative     Direct Exam Cryptosporidium Antigen Assay Negative    Basic Metabolic Panel    Collection Time: 03/13/22  2:26 PM   Result Value Ref Range    Glucose 96 70 - 99 mg/dL    BUN 31 (H) 6 - 20 mg/dL    CREATININE 1.02 (H) 0.50 - 0.90 mg/dL    Calcium 6.5 (L) 8.6 - 10.4 mg/dL    Sodium 137 135 - 144 mmol/L    Potassium 4.8 3.7 - 5.3 mmol/L    Chloride 109 (H) 98 - 107 mmol/L    CO2 17 (L) 20 - 31 mmol/L    Anion Gap 11 9 - 17 mmol/L    GFR Non-African American 59 (L) >60 mL/min    GFR African American >60 >60 mL/min    GFR Comment         Magnesium    Collection Time: 03/13/22  2:26 PM   Result Value Ref Range    Magnesium 2.0 1.6 - 2.6 mg/dL   Phosphorus    Collection Time: 03/13/22  2:26 PM   Result Value Ref Range    Phosphorus 2.8 2.6 - 4.5 mg/dL   EKG 12 Lead    Collection Time: 03/13/22  3:56 PM   Result Value Ref Range    Ventricular Rate 132 BPM    Atrial Rate 132 BPM    P-R Interval 132 ms    QRS Duration 82 ms    Q-T Interval 300 ms    QTc Calculation (Bazett) 444 ms    P Axis 57 degrees    R Axis 69 degrees    T Axis 63 degrees   EKG 12 Lead    Collection Time: 03/13/22  3:57 PM   Result Value Ref Range    Ventricular Rate 130 BPM    Atrial Rate 130 BPM    P-R Interval 136 ms    QRS Duration 80 ms    Q-T Interval 300 ms    QTc Calculation (Bazett) 441 ms    P Axis 58 degrees    R Axis 71 degrees    T Axis 57 degrees   POC Glucose Fingerstick    Collection Time: 03/13/22  4:12 PM   Result Value Ref Range    POC Glucose 91 65 - 105 mg/dL   POC Glucose Fingerstick Collection Time: 03/13/22  5:23 PM   Result Value Ref Range    POC Glucose 107 (H) 65 - 105 mg/dL   POC Glucose Fingerstick    Collection Time: 03/13/22  6:12 PM   Result Value Ref Range    POC Glucose 119 (H) 65 - 105 mg/dL   Basic Metabolic Panel    Collection Time: 03/13/22  6:51 PM   Result Value Ref Range    Glucose 192 (H) 70 - 99 mg/dL    BUN 27 (H) 6 - 20 mg/dL    CREATININE 0.89 0.50 - 0.90 mg/dL    Calcium 6.6 (L) 8.6 - 10.4 mg/dL    Sodium 133 (L) 135 - 144 mmol/L    Potassium 4.2 3.7 - 5.3 mmol/L    Chloride 105 98 - 107 mmol/L    CO2 16 (L) 20 - 31 mmol/L    Anion Gap 12 9 - 17 mmol/L    GFR Non-African American >60 >60 mL/min    GFR African American >60 >60 mL/min    GFR Comment         Magnesium    Collection Time: 03/13/22  6:51 PM   Result Value Ref Range    Magnesium 1.8 1.6 - 2.6 mg/dL   Phosphorus    Collection Time: 03/13/22  6:51 PM   Result Value Ref Range    Phosphorus 2.1 (L) 2.6 - 4.5 mg/dL   Hepatitis Panel, Acute    Collection Time: 03/13/22  6:51 PM   Result Value Ref Range    Hepatitis B Surface Ag NONREACTIVE NONREACTIVE    Hepatitis C Ab NONREACTIVE NONREACTIVE    Hep B Core Ab, IgM NONREACTIVE NONREACTIVE    Hep A IgM NONREACTIVE NONREACTIVE   Protime-INR    Collection Time: 03/13/22  6:51 PM   Result Value Ref Range    Protime 14.8 (H) 11.8 - 14.6 sec    INR 1.2    POC Glucose Fingerstick    Collection Time: 03/13/22  7:14 PM   Result Value Ref Range    POC Glucose 158 (H) 65 - 105 mg/dL   EKG 12 Lead    Collection Time: 03/13/22  7:23 PM   Result Value Ref Range    Ventricular Rate 192 BPM    Atrial Rate 101 BPM    QRS Duration 78 ms    Q-T Interval 252 ms    QTc Calculation (Bazett) 450 ms    R Axis 74 degrees    T Axis 16 degrees   POC Glucose Fingerstick    Collection Time: 03/13/22  8:52 PM   Result Value Ref Range    POC Glucose 251 (H) 65 - 105 mg/dL   POC Glucose Fingerstick    Collection Time: 03/13/22 10:06 PM   Result Value Ref Range    POC Glucose 242 (H) 65 - 105 mg/dL Basic Metabolic Panel    Collection Time: 03/13/22 10:45 PM   Result Value Ref Range    Glucose 235 (H) 70 - 99 mg/dL    BUN 25 (H) 6 - 20 mg/dL    CREATININE 1.03 (H) 0.50 - 0.90 mg/dL    Calcium 6.6 (L) 8.6 - 10.4 mg/dL    Sodium 134 (L) 135 - 144 mmol/L    Potassium 3.8 3.7 - 5.3 mmol/L    Chloride 107 98 - 107 mmol/L    CO2 15 (L) 20 - 31 mmol/L    Anion Gap 12 9 - 17 mmol/L    GFR Non-African American 59 (L) >60 mL/min    GFR African American >60 >60 mL/min    GFR Comment         Magnesium    Collection Time: 03/13/22 10:45 PM   Result Value Ref Range    Magnesium 1.8 1.6 - 2.6 mg/dL   Phosphorus    Collection Time: 03/13/22 10:45 PM   Result Value Ref Range    Phosphorus 1.5 (L) 2.6 - 4.5 mg/dL   POC Glucose Fingerstick    Collection Time: 03/14/22 12:51 AM   Result Value Ref Range    POC Glucose 144 (H) 65 - 105 mg/dL   POC Glucose Fingerstick    Collection Time: 03/14/22  1:55 AM   Result Value Ref Range    POC Glucose 89 65 - 105 mg/dL   EKG 12 Lead    Collection Time: 03/14/22  2:20 AM   Result Value Ref Range    Ventricular Rate 177 BPM    Atrial Rate 163 BPM    QRS Duration 82 ms    Q-T Interval 262 ms    QTc Calculation (Bazett) 449 ms    R Axis 78 degrees    T Axis 16 degrees   POC Glucose Fingerstick    Collection Time: 03/14/22  3:54 AM   Result Value Ref Range    POC Glucose 247 (H) 65 - 105 mg/dL   Basic Metabolic Panel    Collection Time: 03/14/22  4:03 AM   Result Value Ref Range    Glucose 249 (H) 70 - 99 mg/dL    BUN 21 (H) 6 - 20 mg/dL    CREATININE 0.96 (H) 0.50 - 0.90 mg/dL    Calcium 6.6 (L) 8.6 - 10.4 mg/dL    Sodium 132 (L) 135 - 144 mmol/L    Potassium 3.9 3.7 - 5.3 mmol/L    Chloride 105 98 - 107 mmol/L    CO2 16 (L) 20 - 31 mmol/L    Anion Gap 11 9 - 17 mmol/L    GFR Non-African American >60 >60 mL/min    GFR African American >60 >60 mL/min    GFR Comment         Magnesium    Collection Time: 03/14/22  4:03 AM   Result Value Ref Range    Magnesium 1.8 1.6 - 2.6 mg/dL   Phosphorus Collection Time: 03/14/22  4:03 AM   Result Value Ref Range    Phosphorus 2.5 (L) 2.6 - 4.5 mg/dL   TSH w/reflex to FT4    Collection Time: 03/14/22  4:03 AM   Result Value Ref Range    TSH 1.71 0.30 - 5.00 mIU/L   Troponin    Collection Time: 03/14/22  4:03 AM   Result Value Ref Range    Troponin, High Sensitivity 39 (H) 0 - 14 ng/L   Lipid Panel    Collection Time: 03/14/22  4:03 AM   Result Value Ref Range    Cholesterol 56 <200 mg/dL    HDL 11 (L) >40 mg/dL    LDL Cholesterol Can not be calculated 0 - 130 mg/dL    Chol/HDL Ratio 5.1 (H) <5    Triglycerides 233 (H) <150 mg/dL   POC Glucose Fingerstick    Collection Time: 03/14/22  5:13 AM   Result Value Ref Range    POC Glucose 236 (H) 65 - 105 mg/dL   CBC with Auto Differential    Collection Time: 03/14/22  5:14 AM   Result Value Ref Range    WBC 11.0 3.5 - 11.0 k/uL    RBC 4.42 4.0 - 5.2 m/uL    Hemoglobin 13.4 12.0 - 16.0 g/dL    Hematocrit 39.9 36 - 46 %    MCV 90.2 80 - 100 fL    MCH 30.2 26 - 34 pg    MCHC 33.5 31 - 37 g/dL    RDW 14.1 11.5 - 14.9 %    Platelets 339 (L) 054 - 450 k/uL    MPV 9.4 6.0 - 12.0 fL    Seg Neutrophils 80 (H) 36 - 66 %    Lymphocytes 4 (L) 24 - 44 %    Monocytes 3 1 - 7 %    Eosinophils % 0 0 - 4 %    Basophils 0 0 - 2 %    Bands 12 (H) 0 - 10 %    Metamyelocytes 1 (H) 0 %    Segs Absolute 8.80 1.3 - 9.1 k/uL    Absolute Lymph # 0.44 (L) 1.0 - 4.8 k/uL    Absolute Mono # 0.33 0.1 - 1.3 k/uL    Absolute Eos # 0.00 0.0 - 0.4 k/uL    Basophils Absolute 0.00 0.0 - 0.2 k/uL    Absolute Bands # 1.32 (H) 0.0 - 1.0 k/uL    Metamyelocytes Absolute 0.11 (H) 0 k/uL    Morphology HYPOCHROMIA PRESENT     Morphology ANISOCYTOSIS PRESENT    POC Glucose Fingerstick    Collection Time: 03/14/22  7:17 AM   Result Value Ref Range    POC Glucose 177 (H) 65 - 105 mg/dL   EKG 12 Lead    Collection Time: 03/14/22  7:38 AM   Result Value Ref Range    Ventricular Rate 112 BPM    Atrial Rate 112 BPM    P-R Interval 122 ms    QRS Duration 88 ms    Q-T Interval 330 ms    QTc Calculation (Bazett) 450 ms    P Axis 65 degrees    R Axis 71 degrees    T Axis 65 degrees   Basic Metabolic Panel    Collection Time: 03/14/22  8:08 AM   Result Value Ref Range    Glucose 170 (H) 70 - 99 mg/dL    BUN 19 6 - 20 mg/dL    CREATININE 0.96 (H) 0.50 - 0.90 mg/dL    Calcium 6.6 (L) 8.6 - 10.4 mg/dL    Sodium 134 (L) 135 - 144 mmol/L    Potassium 3.7 3.7 - 5.3 mmol/L    Chloride 107 98 - 107 mmol/L    CO2 17 (L) 20 - 31 mmol/L    Anion Gap 10 9 - 17 mmol/L    GFR Non-African American >60 >60 mL/min    GFR African American >60 >60 mL/min    GFR Comment         Magnesium    Collection Time: 03/14/22  8:08 AM   Result Value Ref Range    Magnesium 1.8 1.6 - 2.6 mg/dL   Phosphorus    Collection Time: 03/14/22  8:08 AM   Result Value Ref Range    Phosphorus 2.2 (L) 2.6 - 4.5 mg/dL   Procalcitonin    Collection Time: 03/14/22  8:08 AM   Result Value Ref Range    Procalcitonin 0.47 (H) <0.09 ng/mL   POC Glucose Fingerstick    Collection Time: 03/14/22  8:14 AM   Result Value Ref Range    POC Glucose 147 (H) 65 - 105 mg/dL   Lactic Acid    Collection Time: 03/14/22  8:42 AM   Result Value Ref Range    Lactic Acid 2.2 0.5 - 2.2 mmol/L         2 D ECHOCARDIOGRAM:    Ordered. IMPRESSION:    Paroxysmal atrial fibrillation /flutter with RVR. Suspect supraventricular tachycardia reported is atrial flutter with two-to-one AV conduction. Currently sinus tachycardia. Low CHADS2 Vasc risk score. Diabetic ketoacidosis. History of alcohol abuse. Other problems as charted. REC/PLAN:      As ordered. Continue on IV diltiazem drip maintaining systolic blood pressure greater than 100 and heart rate less than 110 range, change Lovenox to 1 milligram/kg b.i.d., continue on other supportive care. 2D echocardiogram was ordered. Will obtain more history when patient is more awake and alert.       She will require close follow-up on an outpatient basis and will refer her to electrophysiologist on an outpatient basis for further close monitoring and management. Advised patient to stop drinking alcohol and stop alcohol abuse. No family members available at bedside to discuss. Discussed with the nurse at bedside while I was in the ICU. Will follow. Thank you. Electronically signed by Joan Gupta MD, [facility]      PLEASE NOTE:  This progress note was completed using a voice transcription system. Every effort was made to ensure accuracy. However, inadvertent computerized transcription errors may be present.

## 2022-03-14 NOTE — PROGRESS NOTES
RN spoke with Dr. Aziza Rivera about new consult for SVT. Right before calling, Dr. Aldair Goodwin ordered 5 mg of IV lopressor. Pt converted out of SVT to ST where she has been 130-140's. If pt flips back into SVT call cardiology for possible gtt.

## 2022-03-14 NOTE — PROGRESS NOTES
ICU Progress Note (Non-Vent)  [facility]    Patient - [place],  Age - 42 y.o.    - 1980      Room Number -    MRN -  [id number]   [facility]  Date of Admission -  3/12/2022  7:42 AM    Events of Past 24 Hours   Patient appears to be lucid. Had issues with SVT. Was on a diltiazem drip. Cardiology is now involved  Vitals    height is 5' 8\" (1.727 m) and weight is 280 lb (127 kg). Her axillary temperature is 101.4 °F (38.6 °C). Her blood pressure is 83/45 (abnormal) and her pulse is 152. Her respiration is 29 and oxygen saturation is 93%.        Temperature Range: Temp: 101.4 °F (38.6 °C) Temp  Av.6 °F (38.1 °C)  Min: 98.9 °F (37.2 °C)  Max: 101.9 °F (38.8 °C)  BP Range:  Systolic (68XKT), RMN:145 , Min:82 , JAH:903     Diastolic (42LQT), JJU:44, Min:19, Max:150    Pulse Range: Pulse  Av.2  Min: 120  Max: 210  Respiration Range: Resp  Av.9  Min: 23  Max: 43  Current Pulse Ox[de-identified]  SpO2: 93 %  24HR Pulse Ox Range:  SpO2  Av.8 %  Min: 85 %  Max: 100 %  Oxygen Amount and Delivery: O2 Flow Rate (L/min): 2 L/min    Wt Readings from Last 3 Encounters:   22 280 lb (127 kg)   22 280 lb (127 kg)   21 280 lb (127 kg)     I/O       Intake/Output Summary (Last 24 hours) at 3/14/2022 1049  Last data filed at 3/14/2022 0536  Gross per 24 hour   Intake 6036.47 ml   Output 4575 ml   Net 1461.47 ml     DRAIN/TUBE OUTPUT       Invasive Lines   ICP PRESSURE RANGE  No data recorded  CVP PRESSURE RANGE  No data recorded      Medications      vancomycin  1,500 mg IntraVENous Q18H    metroNIDAZOLE  500 mg IntraVENous Q8H    thiamine  50 mg IntraVENous Q8H    miconazole   Topical BID    lamoTRIgine  200 mg Oral Daily    pregabalin  50 mg Oral TID    buPROPion  300 mg Oral QAM    FLUoxetine  20 mg Oral Daily    pravastatin  40 mg Oral Daily    [Held by provider] lisinopril  20 mg Oral Daily    dilTIAZem  240 mg Oral Daily    cefepime  2,000 mg IntraVENous Q8H    vancomycin (VANCOCIN) intermittent dosing (placeholder)   Other RX Placeholder    insulin glargine  20 Units SubCUTAneous Nightly    sodium chloride flush  5-40 mL IntraVENous 2 times per day    enoxaparin  30 mg SubCUTAneous BID    famotidine (PEPCID) injection  20 mg IntraVENous BID    sodium chloride flush  5-40 mL IntraVENous 2 times per day     perflutren lipid microspheres, metoprolol, sodium chloride flush, glucose, dextrose, glucagon (rDNA), dextrose, potassium chloride, magnesium sulfate, sodium phosphate IVPB **OR** sodium phosphate IVPB **OR** sodium phosphate IVPB, polyethylene glycol, dextrose 5 % and 0.45 % NaCl, sodium chloride flush, sodium chloride, ondansetron **OR** ondansetron, acetaminophen **OR** acetaminophen, LORazepam **OR** LORazepam **OR** LORazepam **OR** LORazepam **OR** LORazepam **OR** LORazepam **OR** LORazepam **OR** LORazepam  IV Drips/Infusions   dilTIAZem 15 mg/hr (03/14/22 0720)    dextrose      insulin 0.88 Units/hr (03/14/22 0922)    0.9% NaCl with KCl 20 mEq Stopped (03/13/22 1900)    sodium chloride      dextrose 5 % and 0.45 % NaCl 150 mL/hr at 03/14/22 0804    sodium chloride         Diet/Nutrition   Diet NPO Exceptions are: Ice Chips    Exam   PICC line day #3      Constitutional - Alert, arousable  General Appearance  well developed, well nourished  HEENT -normocephalic, atraumatic. PERRLA  Lungs - Chest expands equally, no wheezes, rales or rhonchi. Cardiovascular - Heart sounds are normal.  normal rate and rhythm regular, no murmur, gallop or rub.   Abdomen - soft, nontender, nondistended,   Skin - no bruising or bleeding  Extremities - no cyanosis,     Lab Results   CBC     Lab Results   Component Value Date    WBC 11.0 03/14/2022    RBC 4.42 03/14/2022    RBC 4.43 06/03/2012    HGB 13.4 03/14/2022    HCT 39.9 03/14/2022     03/14/2022  06/03/2012    MCV 90.2 03/14/2022    MCH 30.2 03/14/2022    MCHC 33.5 03/14/2022    RDW 14.1 03/14/2022    METASPCT 1 03/14/2022    LYMPHOPCT 4 03/14/2022    MONOPCT 3 03/14/2022    BASOPCT 0 03/14/2022    MONOSABS 0.33 03/14/2022    LYMPHSABS 0.44 03/14/2022    EOSABS 0.00 03/14/2022    BASOSABS 0.00 03/14/2022    DIFFTYPE NOT REPORTED 02/01/2022       BMP   Lab Results   Component Value Date     03/14/2022    K 3.7 03/14/2022     03/14/2022    CO2 17 03/14/2022    BUN 19 03/14/2022    CREATININE 0.96 03/14/2022    GLUCOSE 170 03/14/2022    GLUCOSE 403 06/03/2012       LFTS  Lab Results   Component Value Date    ALKPHOS 383 03/13/2022     03/13/2022     03/13/2022    PROT 4.2 03/13/2022    BILITOT 2.80 03/13/2022    BILIDIR 2.43 03/13/2022    IBILI 0.37 03/13/2022    LABALBU 2.1 03/13/2022    LABALBU 3.9 06/03/2012       ABG ABGs:   Lab Results   Component Value Date    PHART 7.437 01/20/2016    PO2ART 90.3 01/20/2016    SLO4QLK 40.7 01/20/2016       Lab Results   Component Value Date    MODE NOT REPORTED 09/25/2017         INR  Recent Labs     03/12/22  0749 03/13/22  1851   PROTIME 15.6* 14.8*   INR 1.2 1.2       APTT  Recent Labs     03/12/22  0749   APTT 35.1       Lactic Acid  Lab Results   Component Value Date    LACTA 2.2 03/14/2022    LACTA 2.8 03/13/2022    LACTA 11.1 03/12/2022        BNP   No results for input(s): BNP in the last 72 hours. Cultures   Urine culture, March 13, gram-negative rods    Radiology     CXR      Patient chest x-ray reveals low lung volumes       SYSTEMS ASSESSMENT    Acute alcohol intoxication  Severe diabetic keto/metabolic acidosis (anion gap, lactic acid)  History of hypercoagulable state, antiphospholipid antibody  Morbid obesity, status post bariatric surgery 2013  SAMIR  Urinary tract infection. Neuro     Alert and lucid.   Respiratory   Chest x-ray appears to have low lung volumes but no gross aspiration    Cardiovascular Cardiology following Cardizem and SVT P currently patient is in early sinus rhythm heart rate 100 to 110 bpm     Gastrointestinal       Renal       Infectious Disease   Currently on broad-spectrum antibiotics. Ordered by admitting team    Hematology/Oncology       Endocrine       Social/Spiritual/DNR/Disposition/Other     Hopefully we can eventually wean insulin drip to off.     Critical Care Time   0 min    Electronically signed by Moris Dyer MD on 3/14/2022 at 10:49 AM

## 2022-03-14 NOTE — CARE COORDINATION
ONGOING DISCHARGE PLAN:    Patient is alert and oriented x4. Spoke with patient regarding discharge plan and patient confirms that plan is still home with no needs. DME: Glucometer. Denies need for info on drug and alcohol rehab. CIWAW on board. IV Cefepime and Vanco. Cardizem and Insulin Gtt. [place] Header 19%, will need follow up with PCP. Will continue to follow for additional discharge needs.     Electronically signed by Jayde Shaw RN on 3/14/2022 at 3:53 PM

## 2022-03-14 NOTE — PROGRESS NOTES
RN spoke with Dr. Aubree Guzman about pts HR ('s). Pt had received 5 mg IV lopressor 1 hr before when HR was 210 and brought HR down to 190's. Order to consult cardiology and give 5 mg IV lopressor once.

## 2022-03-14 NOTE — FLOWSHEET NOTE
Patient sleeping. Mom said she's better today than yesterday. Tense family dynamics. 03/14/22 1600   Encounter Summary   Services provided to: Patient and family together   Referral/Consult From: [facility]   (3-14-22)   Complexity of Encounter Low   Length of Encounter 15 minutes   Routine   Type Follow up   Assessment Approachable   Intervention Active listening;Explored feelings, thoughts, concerns;[place];Sustaining presence/ Ministry of presence; Discussed illness/injury and it's impact   Outcome Expressed gratitude;Engaged in conversation;Receptive

## 2022-03-14 NOTE — PROGRESS NOTES
RN spoke with Dr. Yamil Goodrich about pts HR. EKG was done; pt in SVT. 5 mg IV lopressor given with very small improvement. Orders to contact primary and get cardio consult. Order for 1L LR bolus to be given.

## 2022-03-14 NOTE — PROGRESS NOTES
Resident team updated that patient dozing off and ate about half of her clear liquid dinner. No complaints of nausea. Keep on clears and on DKA protocol for the night. Reassess in AM. Concerned if patient not awake enough to eat, cannot give lantus safely and may drop sugar or go back  Into DKA due to infectious process.  Per resident team.

## 2022-03-14 NOTE — DISCHARGE INSTR - COC
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    GERD (gastroesophageal reflux disease) K21.9    Alcohol dependence in remission ([facility] F10.21    Type 2 diabetes mellitus with diabetic polyneuropathy, with long-term current use of insulin ([facility]) E11.42, Z79.4    Primary insomnia F51.01    Essential hypertension I10    Bipolar affective disorder in remission ([facility] F31.70    History of pulmonary embolism Z86.711    Antiphospholipid syndrome ([facility]) D68.61    Alcohol intoxication ([facility] F10.929    Hypertriglyceridemia E78.1    Chronic post-traumatic stress disorder (PTSD) F43.12    OCD (obsessive compulsive disorder) F42.9    Severe benzodiazepine use disorder ([facility]) F13.20    Morbid obesity with BMI of 50.0-59.9, adult ([facility]) E66.01, Z68.43    History of MRSA infection Z86.14    Pain of upper abdomen R10.10    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    NAFLD (nonalcoholic fatty liver disease) K76.0    Nondependent opioid abuse in remission ([facility] F11.11    Osteopenia M85.80    History of Awa-en-Y gastric bypass Z98.84    Herpes genitalis A60.00    History of drug abuse ([facility]) F19.11    Malabsorption K90.9    History of pulmonary embolus (PE) Z86.711    Vitamin B 12 deficiency E53.8    Vitamin D deficiency E55.9    History of surgery of liver Z98.890    Blood alkaline phosphatase increased compared with prior measurement R74.8    Paroxysmal SVT (supraventricular tachycardia) ([facility]) I47.1    Anxiety F41.9    Hypomagnesemia E83.42    Chronic idiopathic constipation K59.04    Recurrent incisional hernia K43.2    History of small bowel obstruction Z87.19    History of peptic ulcer Z87.11 Fibromyalgia M79.7    Polyneuropathy G62.9    COVID-19 virus infection U07.1    DKA, type 2, not at goal [facility]) E11.10    GAIL (acute kidney injury) ([facility] N17.9    Acute pancreatitis K85.90    Multifocal pneumonia F82.2    Alcoholic hepatitis T18.58       Isolation/Infection:   Isolation            Contact          Patient Infection Status       Infection Onset Added Last Indicated Last Indicated By Review Planned Expiration Resolved Resolved By    MRSA  10/27/14 02/22/19 Urine culture clean catch        8/2017 groin abscess    Resolved    COVID-19 (Rule Out) 03/14/22 03/14/22 03/14/22 Respiratory Panel, Molecular, with COVID-19 (Restricted: peds pts or suitable admitted adults) (Ordered)   03/14/22 Marc Wheeler RN    3/5/2022 COVID negative    C-diff Rule Out 03/13/22 03/13/22 03/13/22 Gastrointestinal Panel, Molecular (Ordered)   03/13/22 Rule-Out Test Resulted            Nurse Assessment:  Last Vital Signs: BP (!) 129/57   Pulse 105   Temp 99.1 °F (37.3 °C) (Axillary)   Resp 23   Ht 5' 8\" (1.727 m)   Wt 280 lb (127 kg)   SpO2 90%   BMI 42.57 kg/m²     Last documented pain score (0-10 scale): Pain Level: 0  Last Weight:   Wt Readings from Last 1 Encounters:   03/12/22 280 lb (127 kg)     Mental Status:  oriented and alert    IV Access:  - None    Nursing Mobility/ADLs:  Walking   Assisted  Transfer  Assisted  Bathing  Assisted  Dressing  Assisted  Toileting  Assisted  Feeding  Independent  Med Admin  Independent  Med Delivery   whole    Wound Care Documentation and Therapy:  Wound 11/17/18 Abdomen Mid (Active)   Number of days: 8360       Wound 03/14/22 Buttocks Left;Right (Active)   Wound Image   03/14/22 1746   Wound Etiology Deep tissue/Injury 03/14/22 1746   Dressing Status New dressing applied; Old drainage noted;New drainage noted 03/14/22 1746   Wound Cleansed Soap and water 03/14/22 1746   Dressing/Treatment Pharmaceutical agent (see MAR) 03/14/22 1746   Wound Assessment Purple/maroon;Ruptured blister 03/14/22 1746   Drainage Amount Small 03/14/22 1746   Drainage Description Serosanguinous 03/14/22 1746   Odor None 03/14/22 1746   Valerie-wound Assessment Blanchable erythema;Dry/flaky 03/14/22 1746   Margins Attached edges 03/14/22 1746   Number of days: 0      Buttocks: cleanse gently with foam cleanser, pat dry. Apply Triad cream twice daily and as needed      Elimination:  Continence: Bowel: Yes  Bladder: Yes  Urinary Catheter: None   Colostomy/Ileostomy/Ileal Conduit: No       Date of Last BM:     Intake/Output Summary (Last 24 hours) at 3/14/2022 1812  Last data filed at 3/14/2022 1730  Gross per 24 hour   Intake 9485.8 ml   Output 8000 ml   Net 1485.8 ml     I/O last 3 completed shifts: In: 7886 [I.V.:2995.5; IV Piggyback:3144.6]  Out: 1451 [Urine:5875]    Safety Concerns: At Risk for Falls    Impairments/Disabilities:      None    Nutrition Therapy:  Current Nutrition Therapy:   - Oral Diet:  General    Routes of Feeding: Oral  Liquids: No Restrictions  Daily Fluid Restriction: no  Last Modified Barium Swallow with Video (Video Swallowing Test): not done    Treatments at the Time of Hospital Discharge:   Respiratory Treatments: none  Oxygen Therapy:  is not on home oxygen therapy. Ventilator:    - No ventilator support    Rehab Therapies: Physical Therapy and Occupational Therapy  Weight Bearing Status/Restrictions: No weight bearing restrictions  Other Medical Equipment (for information only, NOT a DME order):  walker  Other Treatments: Skilled nursing assessment and monitoring. Medication education and monitoring per protocol.     Patient's personal belongings (please select all that are sent with patient):  None    RN SIGNATURE:  Electronically signed by Cameron Alvarez RN on 3/19/22 at 11:48 AM EDT    CASE MANAGEMENT/SOCIAL WORK SECTION    Inpatient Status Date: 3/12/2022    Readmission Risk Assessment Score:  Readmission Risk              Risk of Unplanned Readmission:  42 Discharging to Facility/ [address] [place] [address]  Phone [phone]  Fax  [phone]   Dialysis Facility (if applicable)   Name:  Address:  Dialysis Schedule:  Phone:  Fax:    / signature: Electronically signed by Andie Orozco RN on 3/19/22 at 11:48 AM EDT    PHYSICIAN SECTION    Prognosis: {Prognosis:1629404309}    Condition at Discharge: [address] [place] [address] Patient Condition:[id number]}    Rehab Potential (if transferring to Rehab): {Prognosis:0378546707}    Recommended Labs or Other Treatments After Discharge: ***    Physician Certification: I certify the above information and transfer of Natalie Sun  is necessary for the continuing treatment of the diagnosis listed and that she requires {Admit to Appropriate Level of Care:20349} for {GREATER/LESS:916687188} 30 days.      Update Admission H&P: {CHP DME Changes in [facility]}    PHYSICIAN SIGNATURE:  Electronically signed by Colonel Meeta MD on 3/19/22 at 12:43 PM EDT

## 2022-03-14 NOTE — PROGRESS NOTES
RN spoke with Elmer Syed CNP cardiology about pts HR and EKG Afib RVR. Orders received for Cardizem bolus 10mg once and then Cardizem gtt (Titrate to HR <120).  Call back if SBP<95 or maintains 95

## 2022-03-14 NOTE — PROGRESS NOTES
Vancomycin Dosing by Pharmacy - Daily Note   Vancomycin Therapy Day:  2  Indication: Sepsis    Allergies:  Asa [aspirin], Betadine [povidone iodine], Celexa [citalopram hydrobromide], Citalopram, Lasix [furosemide], Macrobid [nitrofurantoin], Norco [hydrocodone-acetaminophen], Betadine [povidone iodine], Codeine, Lithium, Paroxetine, Paxil [paroxetine hcl], Tape [adhesive tape], and Tegretol [carbamazepine]   Actual Weight:    Wt Readings from Last 1 Encounters:   03/12/22 280 lb (127 kg)       Labs/Ancillary Data  Estimated Creatinine Clearance: 107 mL/min (A) (based on SCr of 0.96 mg/dL (H)). Recent Labs     03/12/22  0749 03/12/22  1602 03/13/22  1851 03/13/22  2245 03/14/22  0403 03/14/22  0514   CREATININE 1.50*   < > 0.89 1.03* 0.96*  --    BUN 31*   < > 27* 25* 21*  --    WBC 8.1  --   --   --   --  11.0    < > = values in this interval not displayed. Procalcitonin   Date Value Ref Range Status   03/12/2022 0.11 (H) <0.09 ng/mL Final     Comment:           Suspected Sepsis:  <0.50 ng/mL     Low likelihood of sepsis. 0.50-2.00 ng/mL     Increased likelihood of sepsis. Antibiotics encouraged. >2.00 ng/mL     High risk of sepsis/shock. Antibiotics strongly encouraged. Suspected Lower Resp Tract Infections:  <0.24 ng/mL     Low likelihood of bacterial infection. >0.24 ng/mL     Increased likelihood of bacterial infection. Antibiotics encouraged. With successful antibiotic therapy, PCT levels should decrease rapidly. (Half-life of 24 to   36 hours.)        Procalcitonin values from samples collected within the first 6 hours of systemic infection   may still be low. Retesting may be indicated. Values from day 1 and day 4 can be entered into the Change in Procalcitonin Calculator   ([email or web address].[email or web address]-pct-calculator. Loginza) to determine the patient's Mortality Risk Prognosis        In healthy neonates, plasma Procalcitonin (PCT) concentrations increase gradually after   birth, reaching peak values at about 24 hours of age then decrease to normal values below   0.5 ng/mL by 48-72 hours of age. Intake/Output Summary (Last 24 hours) at 3/14/2022 0751  Last data filed at 3/14/2022 0536  Gross per 24 hour   Intake 6036.47 ml   Output 4575 ml   Net 1461.47 ml     Temp: 101.4    Culture Date / Source  /  Results  Pending  Recent vancomycin administrations                     vancomycin (VANCOCIN) 2,500 mg in dextrose 5 % 500 mL IVPB (mg) 2,500 mg New Bag 03/13/22 2000                    Vancomycin Concentrations:   TROUGH:  No results for input(s): VANCOTROUGH in the last 72 hours. RANDOM:  No results for input(s): VANCORANDOM in the last 72 hours. MRSA Nasal Swab: Pending. PLAN     Srcr stable, change dose to 1500 mg Iv every 18 hrs per [facility]. Vancomycin Target Concentration Parameters  Treatment  Population Target AUC/JAVED Target Trough   Invasive MRSA Infection (bacteremia, pneumonia, meningitis, endocarditis, osteomyelitis)  Sepsis (undifferentiated) 400-600 N/A   Infection due to non-MRSA pathogen  Empiric treatment of non-invasive MRSA infection  (SSTI, UTI) <500 10-15 mg/L   CrCl < 29 mL/min  Rapidly fluctuating serum creatinine   GAIL N/A < 15 mg/L     Renal replacement therapy is dosed by levels, per hospital protocol. Abbreviations  * Pauc: probability that AUC is >400 (efficacy); Pconc: probability that Ctrough is above 20 ?g/mL (toxicity); Tox: Probability of nephrotoxicity, based on Rolo et al. Clin Infect Dis 2009. Thank you for the consult. Pharmacy will continue to follow. Brendon Scherer. [address] [facility]

## 2022-03-14 NOTE — PROGRESS NOTES
with fluids and insulin continuous infusion. Patient was placed on DKA protocol and decision was made to admit the patient to the ICU with consults in place for pulmonology critical care. Review of Systems:     Review of Systems   Constitutional: Negative for appetite change, chills, fatigue and fever. HENT: Negative for congestion and sore throat. Eyes: Negative for redness. Respiratory: Negative for chest tightness, shortness of breath and wheezing. Cardiovascular: Negative for chest pain, palpitations and leg swelling. Gastrointestinal: Negative for abdominal pain, nausea and vomiting. Genitourinary: Negative for dysuria and hematuria. Musculoskeletal: Negative for back pain. Skin: Negative for color change. Neurological: Negative for dizziness, weakness, light-headedness and headaches. Psychiatric/Behavioral: Negative for confusion and decreased concentration. Medications:      Allergies:  Asa [aspirin], Betadine [povidone iodine], Celexa [citalopram hydrobromide], Citalopram, Lasix [furosemide], Macrobid [nitrofurantoin], Norco [hydrocodone-acetaminophen], Betadine [povidone iodine], Codeine, Lithium, Paroxetine, Paxil [paroxetine hcl], Tape [adhesive tape], and Tegretol [carbamazepine]    Current Meds:   Scheduled Meds:    vancomycin  1,500 mg IntraVENous Q18H    metroNIDAZOLE  500 mg IntraVENous Q8H    thiamine  50 mg IntraVENous Q8H    miconazole   Topical BID    lamoTRIgine  200 mg Oral Daily    pregabalin  50 mg Oral TID    buPROPion  300 mg Oral QAM    FLUoxetine  20 mg Oral Daily    pravastatin  40 mg Oral Daily    [Held by provider] lisinopril  20 mg Oral Daily    dilTIAZem  240 mg Oral Daily    cefepime  2,000 mg IntraVENous Q8H    vancomycin (VANCOCIN) intermittent dosing (placeholder)   Other RX Placeholder    insulin glargine  20 Units SubCUTAneous Nightly    sodium chloride flush  5-40 mL IntraVENous 2 times per day    enoxaparin  30 mg SubCUTAneous BID    famotidine (PEPCID) injection  20 mg IntraVENous BID    sodium chloride flush  5-40 mL IntraVENous 2 times per day     Continuous Infusions:    dilTIAZem 15 mg/hr (03/14/22 0720)    dextrose      insulin 0.88 Units/hr (03/14/22 0922)    0.9% NaCl with KCl 20 mEq Stopped (03/13/22 1900)    sodium chloride      dextrose 5 % and 0.45 % NaCl 150 mL/hr at 03/14/22 0804    sodium chloride       PRN Meds: perflutren lipid microspheres, metoprolol, sodium chloride flush, glucose, dextrose, glucagon (rDNA), dextrose, potassium chloride, magnesium sulfate, sodium phosphate IVPB **OR** sodium phosphate IVPB **OR** sodium phosphate IVPB, polyethylene glycol, dextrose 5 % and 0.45 % NaCl, sodium chloride flush, sodium chloride, ondansetron **OR** ondansetron, acetaminophen **OR** acetaminophen, LORazepam **OR** LORazepam **OR** LORazepam **OR** LORazepam **OR** LORazepam **OR** LORazepam **OR** LORazepam **OR** LORazepam    Data:     Past Medical History:    Past Medical History:   Diagnosis Date    Alcohol abuse     sober since5    Anxiety     Arthritis     Painting esophagus     Benzodiazepine overdose 9/26/2015    [facility]ipolar disorder, unspecified [facility]     Bipolar I disorder, most recent episode depressed, severe without psychotic features ([facility] [facility]     Cellulitis 11/17/2018    Chronic abdominal wound infection 9/27/2015    Constipation 9/3/2019    Depression 7/12/2015    Drug overdose, intentional ([facility] [address] 7/12/2015    Genital herpes, unspecified     GERD (gastroesophageal reflux disease)     History of pulmonary embolism     Hx of blood clots dx 2 years ago    clots in both legs and lungs     Hypertension     Iron deficiency anemia     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    Lumbosacral spondylosis without myelopathy     MDRO (multiple drug resistant organisms) resistance 2010    MRSA (abd)    Miscarriage     multiple, around 7th mos pregnant each time d/t hypercoagulation    Morbid obesity ([facility]     MRSA (methicillin resistant staph aureus) culture positive 02/10/2017    urine    Overdose of benzodiazepine     Pernicious anemia     Primary hypercoagulable state ([facility]     antiphospholipid antibodies on Arixtra shots daily    Pulmonary embolism ([facility]     Suicidal behavior 2015    Suicidal ideation     Suicide attempt ([facility] 2014    hx OD on painkillers and rubbing alcohol    SVT (supraventricular tachycardia) ([facility] 2017    Toxic effect of ethanol, intentional self-harm ([facility] 2015    Type 2 diabetes mellitus, with long-term current use of insulin ([facility]        Social History:    Tobacco:   Patient  reports that she has never smoked. She has never used smokeless tobacco.  Alcohol:     Patient  reports no history of alcohol use. Drug Use: Patient  reports no history of drug use. Family History:   Family History   Problem Relation Age of Onset    Depression Mother     High Blood Pressure Mother     High Cholesterol Mother     Diabetes Father     Heart Disease Father     Kidney Disease Father     High Blood Pressure Father     High Cholesterol Father     Cancer Maternal Uncle         of duodenum had whipple    Breast Cancer Maternal Aunt     Breast Cancer Maternal Cousin        Vitals:  BP (!) 83/45   Pulse 152   Temp 101.4 °F (38.6 °C) (Axillary)   Resp 29   Ht 5' 8\" (1.727 m)   Wt 280 lb (127 kg)   SpO2 93%   BMI 42.57 kg/m²   Temp (24hrs), Av.6 °F (38.1 °C), Min:98.9 °F (37.2 °C), Max:101.9 °F (38.8 °C)    Vitals:    22 0545 22 0622 22 0630 22 0645   BP: (!) 92/48 105/72 (!) 118/98 (!) 83/45   Pulse: 142 153 150 152   Resp: 30 25 29 29   Temp:       TempSrc:       SpO2: 95% 94%  93%   Weight:       Height:           O2 Requirements: On room air    Lines/Drains/Access: Peripheral IV, urethral catheter    I/O(24Hr):     Intake/Output Summary (Last 24 hours) at 3/14/2022 0930  Last data filed at 3/14/2022 0536  Gross per 24 hour Intake 6036.47 ml   Output 4575 ml   Net 1461.47 ml       Labs:  Recent Results (from the past 24 hour(s))   C DIFF TOXIN/ANTIGEN    Collection Time: 03/13/22  9:37 AM    Specimen: Stool   Result Value Ref Range    Specimen Description . FECES     C DIFF AG + TOXIN NEGATIVE NEGATIVE   O&P PANEL ([facility]) #1    Collection Time: 03/13/22  9:37 AM    Specimen: Stool   Result Value Ref Range    Specimen Description . FECES     MICRO OVA & PARASITES       A Giardia/Cryptosporidium Screen has been performed. A traditional Ova and Parasite exam, if collected in a preservative, may be requested by contacting the laboratory at [phone] within 72 hrs of collection if the patient has visited an endemic area or traveled outside of the [place]   POC Glucose Fingerstick    Collection Time: 03/13/22 10:00 AM   Result Value Ref Range    POC Glucose 203 (H) 65 - 105 mg/dL   Lactic Acid    Collection Time: 03/13/22 10:16 AM   Result Value Ref Range    Lactic Acid 2.8 (H) 0.5 - 2.2 mmol/L   POC Glucose Fingerstick    Collection Time: 03/13/22 11:02 AM   Result Value Ref Range    POC Glucose 184 (H) 65 - 105 mg/dL   POC Glucose Fingerstick    Collection Time: 03/13/22 11:54 AM   Result Value Ref Range    POC Glucose 243 (H) 65 - 105 mg/dL   POC Glucose Fingerstick    Collection Time: 03/13/22  1:54 PM   Result Value Ref Range    POC Glucose 116 (H) 65 - 105 mg/dL   Giardia / Cryptosporidum antigens    Collection Time: 03/13/22  2:08 PM   Result Value Ref Range    Specimen Description . FECES     Direct Exam Giardia Antigen Assay Negative     Direct Exam Cryptosporidium Antigen Assay Negative    Basic Metabolic Panel    Collection Time: 03/13/22  2:26 PM   Result Value Ref Range    Glucose 96 70 - 99 mg/dL    BUN 31 (H) 6 - 20 mg/dL    CREATININE 1.02 (H) 0.50 - 0.90 mg/dL    Calcium 6.5 (L) 8.6 - 10.4 mg/dL    Sodium 137 135 - 144 mmol/L    Potassium 4.8 3.7 - 5.3 mmol/L    Chloride 109 (H) 98 - 107 mmol/L    CO2 17 (L) 20 - 31 mmol/L    Anion Gap 11 9 - 17 mmol/L    GFR Non-African American 59 (L) >60 mL/min    GFR African American >60 >60 mL/min    GFR Comment         Magnesium    Collection Time: 03/13/22  2:26 PM   Result Value Ref Range    Magnesium 2.0 1.6 - 2.6 mg/dL   Phosphorus    Collection Time: 03/13/22  2:26 PM   Result Value Ref Range    Phosphorus 2.8 2.6 - 4.5 mg/dL   EKG 12 Lead    Collection Time: 03/13/22  3:56 PM   Result Value Ref Range    Ventricular Rate 132 BPM    Atrial Rate 132 BPM    P-R Interval 132 ms    QRS Duration 82 ms    Q-T Interval 300 ms    QTc Calculation (Bazett) 444 ms    P Axis 57 degrees    R Axis 69 degrees    T Axis 63 degrees   EKG 12 Lead    Collection Time: 03/13/22  3:57 PM   Result Value Ref Range    Ventricular Rate 130 BPM    Atrial Rate 130 BPM    P-R Interval 136 ms    QRS Duration 80 ms    Q-T Interval 300 ms    QTc Calculation (Bazett) 441 ms    P Axis 58 degrees    R Axis 71 degrees    T Axis 57 degrees   POC Glucose Fingerstick    Collection Time: 03/13/22  4:12 PM   Result Value Ref Range    POC Glucose 91 65 - 105 mg/dL   POC Glucose Fingerstick    Collection Time: 03/13/22  5:23 PM   Result Value Ref Range    POC Glucose 107 (H) 65 - 105 mg/dL   POC Glucose Fingerstick    Collection Time: 03/13/22  6:12 PM   Result Value Ref Range    POC Glucose 119 (H) 65 - 105 mg/dL   Basic Metabolic Panel    Collection Time: 03/13/22  6:51 PM   Result Value Ref Range    Glucose 192 (H) 70 - 99 mg/dL    BUN 27 (H) 6 - 20 mg/dL    CREATININE 0.89 0.50 - 0.90 mg/dL    Calcium 6.6 (L) 8.6 - 10.4 mg/dL    Sodium 133 (L) 135 - 144 mmol/L    Potassium 4.2 3.7 - 5.3 mmol/L    Chloride 105 98 - 107 mmol/L    CO2 16 (L) 20 - 31 mmol/L    Anion Gap 12 9 - 17 mmol/L    GFR Non-African American >60 >60 mL/min    GFR African American >60 >60 mL/min    GFR Comment         Magnesium    Collection Time: 03/13/22  6:51 PM   Result Value Ref Range    Magnesium 1.8 1.6 - 2.6 mg/dL   Phosphorus    Collection Time: 03/13/22  6:51 PM   Result Value Ref Range    Phosphorus 2.1 (L) 2.6 - 4.5 mg/dL   Hepatitis Panel, Acute    Collection Time: 03/13/22  6:51 PM   Result Value Ref Range    Hepatitis B Surface Ag NONREACTIVE NONREACTIVE    Hepatitis C Ab NONREACTIVE NONREACTIVE    Hep B Core Ab, IgM NONREACTIVE NONREACTIVE    Hep A IgM NONREACTIVE NONREACTIVE   Protime-INR    Collection Time: 03/13/22  6:51 PM   Result Value Ref Range    Protime 14.8 (H) 11.8 - 14.6 sec    INR 1.2    POC Glucose Fingerstick    Collection Time: 03/13/22  7:14 PM   Result Value Ref Range    POC Glucose 158 (H) 65 - 105 mg/dL   EKG 12 Lead    Collection Time: 03/13/22  7:23 PM   Result Value Ref Range    Ventricular Rate 192 BPM    Atrial Rate 101 BPM    QRS Duration 78 ms    Q-T Interval 252 ms    QTc Calculation (Bazett) 450 ms    R Axis 74 degrees    T Axis 16 degrees   POC Glucose Fingerstick    Collection Time: 03/13/22  8:52 PM   Result Value Ref Range    POC Glucose 251 (H) 65 - 105 mg/dL   POC Glucose Fingerstick    Collection Time: 03/13/22 10:06 PM   Result Value Ref Range    POC Glucose 242 (H) 65 - 105 mg/dL   Basic Metabolic Panel    Collection Time: 03/13/22 10:45 PM   Result Value Ref Range    Glucose 235 (H) 70 - 99 mg/dL    BUN 25 (H) 6 - 20 mg/dL    CREATININE 1.03 (H) 0.50 - 0.90 mg/dL    Calcium 6.6 (L) 8.6 - 10.4 mg/dL    Sodium 134 (L) 135 - 144 mmol/L    Potassium 3.8 3.7 - 5.3 mmol/L    Chloride 107 98 - 107 mmol/L    CO2 15 (L) 20 - 31 mmol/L    Anion Gap 12 9 - 17 mmol/L    GFR Non-African American 59 (L) >60 mL/min    GFR African American >60 >60 mL/min    GFR Comment         Magnesium    Collection Time: 03/13/22 10:45 PM   Result Value Ref Range    Magnesium 1.8 1.6 - 2.6 mg/dL   Phosphorus    Collection Time: 03/13/22 10:45 PM   Result Value Ref Range    Phosphorus 1.5 (L) 2.6 - 4.5 mg/dL   POC Glucose Fingerstick    Collection Time: 03/14/22 12:51 AM   Result Value Ref Range    POC Glucose 144 (H) 65 - 105 mg/dL POC Glucose Fingerstick    Collection Time: 03/14/22  1:55 AM   Result Value Ref Range    POC Glucose 89 65 - 105 mg/dL   EKG 12 Lead    Collection Time: 03/14/22  2:20 AM   Result Value Ref Range    Ventricular Rate 177 BPM    Atrial Rate 163 BPM    QRS Duration 82 ms    Q-T Interval 262 ms    QTc Calculation (Bazett) 449 ms    R Axis 78 degrees    T Axis 16 degrees   POC Glucose Fingerstick    Collection Time: 03/14/22  3:54 AM   Result Value Ref Range    POC Glucose 247 (H) 65 - 105 mg/dL   Basic Metabolic Panel    Collection Time: 03/14/22  4:03 AM   Result Value Ref Range    Glucose 249 (H) 70 - 99 mg/dL    BUN 21 (H) 6 - 20 mg/dL    CREATININE 0.96 (H) 0.50 - 0.90 mg/dL    Calcium 6.6 (L) 8.6 - 10.4 mg/dL    Sodium 132 (L) 135 - 144 mmol/L    Potassium 3.9 3.7 - 5.3 mmol/L    Chloride 105 98 - 107 mmol/L    CO2 16 (L) 20 - 31 mmol/L    Anion Gap 11 9 - 17 mmol/L    GFR Non-African American >60 >60 mL/min    GFR African American >60 >60 mL/min    GFR Comment         Magnesium    Collection Time: 03/14/22  4:03 AM   Result Value Ref Range    Magnesium 1.8 1.6 - 2.6 mg/dL   Phosphorus    Collection Time: 03/14/22  4:03 AM   Result Value Ref Range    Phosphorus 2.5 (L) 2.6 - 4.5 mg/dL   TSH w/reflex to FT4    Collection Time: 03/14/22  4:03 AM   Result Value Ref Range    TSH 1.71 0.30 - 5.00 mIU/L   Troponin    Collection Time: 03/14/22  4:03 AM   Result Value Ref Range    Troponin, High Sensitivity 39 (H) 0 - 14 ng/L   Lipid Panel    Collection Time: 03/14/22  4:03 AM   Result Value Ref Range    Cholesterol 56 <200 mg/dL    HDL 11 (L) >40 mg/dL    LDL Cholesterol Can not be calculated 0 - 130 mg/dL    Chol/HDL Ratio 5.1 (H) <5    Triglycerides 233 (H) <150 mg/dL   POC Glucose Fingerstick    Collection Time: 03/14/22  5:13 AM   Result Value Ref Range    POC Glucose 236 (H) 65 - 105 mg/dL   CBC with Auto Differential    Collection Time: 03/14/22  5:14 AM   Result Value Ref Range    WBC 11.0 3.5 - 11.0 k/uL    RBC 4. 42 4.0 - 5.2 m/uL    Hemoglobin 13.4 12.0 - 16.0 g/dL    Hematocrit 39.9 36 - 46 %    MCV 90.2 80 - 100 fL    MCH 30.2 26 - 34 pg    MCHC 33.5 31 - 37 g/dL    RDW 14.1 11.5 - 14.9 %    Platelets 338 (L) 348 - 450 k/uL    MPV 9.4 6.0 - 12.0 fL    Seg Neutrophils 80 (H) 36 - 66 %    Lymphocytes 4 (L) 24 - 44 %    Monocytes 3 1 - 7 %    Eosinophils % 0 0 - 4 %    Basophils 0 0 - 2 %    Bands 12 (H) 0 - 10 %    Metamyelocytes 1 (H) 0 %    Segs Absolute 8.80 1.3 - 9.1 k/uL    Absolute Lymph # 0.44 (L) 1.0 - 4.8 k/uL    Absolute Mono # 0.33 0.1 - 1.3 k/uL    Absolute Eos # 0.00 0.0 - 0.4 k/uL    Basophils Absolute 0.00 0.0 - 0.2 k/uL    Absolute Bands # 1.32 (H) 0.0 - 1.0 k/uL    Metamyelocytes Absolute 0.11 (H) 0 k/uL    Morphology HYPOCHROMIA PRESENT     Morphology ANISOCYTOSIS PRESENT    POC Glucose Fingerstick    Collection Time: 03/14/22  7:17 AM   Result Value Ref Range    POC Glucose 177 (H) 65 - 105 mg/dL   EKG 12 Lead    Collection Time: 03/14/22  7:38 AM   Result Value Ref Range    Ventricular Rate 112 BPM    Atrial Rate 112 BPM    P-R Interval 122 ms    QRS Duration 88 ms    Q-T Interval 330 ms    QTc Calculation (Bazett) 450 ms    P Axis 65 degrees    R Axis 71 degrees    T Axis 65 degrees   Basic Metabolic Panel    Collection Time: 03/14/22  8:08 AM   Result Value Ref Range    Glucose 170 (H) 70 - 99 mg/dL    BUN 19 6 - 20 mg/dL    CREATININE 0.96 (H) 0.50 - 0.90 mg/dL    Calcium 6.6 (L) 8.6 - 10.4 mg/dL    Sodium 134 (L) 135 - 144 mmol/L    Potassium 3.7 3.7 - 5.3 mmol/L    Chloride 107 98 - 107 mmol/L    CO2 17 (L) 20 - 31 mmol/L    Anion Gap 10 9 - 17 mmol/L    GFR Non-African American >60 >60 mL/min    GFR African American >60 >60 mL/min    GFR Comment         Magnesium    Collection Time: 03/14/22  8:08 AM   Result Value Ref Range    Magnesium 1.8 1.6 - 2.6 mg/dL   Phosphorus    Collection Time: 03/14/22  8:08 AM   Result Value Ref Range    Phosphorus 2.2 (L) 2.6 - 4.5 mg/dL   Procalcitonin Collection Time: 03/14/22  8:08 AM   Result Value Ref Range    Procalcitonin 0.47 (H) <0.09 ng/mL   POC Glucose Fingerstick    Collection Time: 03/14/22  8:14 AM   Result Value Ref Range    POC Glucose 147 (H) 65 - 105 mg/dL   Lactic Acid    Collection Time: 03/14/22  8:42 AM   Result Value Ref Range    Lactic Acid 2.2 0.5 - 2.2 mmol/L       Lab Results   Component Value Date/Time    SPECIAL NOT REPORTED 09/03/2019 07:52 PM     Lab Results   Component Value Date/Time    CULTURE NO GROWTH 12 HOURS 03/13/2022 08:25 AM       Recent Labs     03/14/22  0354 03/14/22  0513 03/14/22  0717 03/14/22  0814   POCGLU 247* 236* 177* 147*       Radiology:    CT Head WO Contrast    Result Date: 3/12/2022  No acute intracranial abnormality. CT ABDOMEN PELVIS W IV CONTRAST Additional Contrast? Radiologist Recommendation    Result Date: 3/14/2022  1. Multifocal pneumonia, most pronounced in the left lower lobe. 2. Inflammatory changes surrounding the pancreas concerning for acute pancreatitis. No focal fluid collection. 3. Fatty liver. 4. Anasarca. 5. Presacral edema without a discrete abscess. 6. Inflammatory changes surround the bladder, concerning for underlying infection. 7. No bowel obstruction. XR CHEST PORTABLE    Result Date: 3/13/2022  Intervally placed right upper extremity PICC distal tip projects over the right atrium. No discrete pneumothorax. Left basilar opacities persist and appear mildly worsened, concerning for developing infection. Aspiration may have a similar appearance. Low lung volumes. XR CHEST PORTABLE    Result Date: 3/12/2022  Low lung volumes. Diffuse interstitial opacities in the perihilar areas with left perihilar and lower lobe alveolar type opacities consistent with multifocal airspace disease. Physical Examination:        Physical Exam  Constitutional:       General: She is not in acute distress. Appearance: Normal appearance. She is obese.    HENT:      Head: Normocephalic and atraumatic. Right Ear: External ear normal.      Left Ear: External ear normal.      Nose: Nose normal.   Eyes:      Extraocular Movements: Extraocular movements intact. Cardiovascular:      Rate and Rhythm: Regular rhythm. Tachycardia present. Pulses: Normal pulses. Heart sounds: Normal heart sounds. No murmur heard. Pulmonary:      Effort: Pulmonary effort is normal. No respiratory distress. Breath sounds: Normal breath sounds. No wheezing. Abdominal:      General: Bowel sounds are normal. There is no distension. Palpations: Abdomen is soft. Tenderness: There is no abdominal tenderness. There is no guarding or rebound. Musculoskeletal:      Right lower leg: No edema. Left lower leg: No edema. Skin:     General: Skin is warm. Coloration: Skin is not jaundiced. Neurological:      General: No focal deficit present. Mental Status: She is alert and oriented to person, place, and time. Mental status is at baseline. Psychiatric:         Mood and Affect: Mood normal.         Behavior: Behavior normal.         Thought Content:  Thought content normal.           Assessment:        Primary Problem  Multifocal pneumonia    Active Hospital Problems    Diagnosis Date Noted    Acute pancreatitis [K85.90] 03/14/2022    Multifocal pneumonia [J18.9] 46/25/0291    Alcoholic hepatitis [K62.24] 03/14/2022    GAIL (acute kidney injury) ([facility] [address] [N17.9] 03/13/2022    DKA, type 2, not at goal [facility]) [E11.10] 03/12/2022    Alcohol intoxication ([facility] [address] [F10.929] 12/16/2015       Plan:        Diabetic Ketoacidosis, 2/2 poor insulin compliance  · NPO, initiate DKA protocol  · BMP q4h, initial Mag and Phos levels, glucose checks every hour  · Urinalysis reviewed and urine ketones: negative; Betahydroxybutarate levels: negative  · Insulin Regular started at 0.1 Units/kg/hr, continue due to sepsis  · D5 and 0.45% NS at 150 mL/hr   · Hypoglycemia, phos and potassium replacement protocols  Sepsis secondary to multifocal pneumonia of left lower lobe versus UTI:   · Temp 101.4, , tachypneic, WBC within normal limits   · CT abdomen/pelvis: Findings of multifocal pneumonia of LLL  · Respiratory panel pending and procalcitonin elevated 0.47  · C. difficile negative, Urine and blood cultures sent  · Flagyl, vancomycin, cefepime started  Acute pancreatitis: Inflammatory changes surrounding the pancreas on CT abdomen pelvis, lipase downtrending, continue fluids  Alcohol abuse: CIWA protocol, thiamine 50 mg IV  GAIL (improving): Creatinine 1.50 on admission, now 0.96; continue fluids  Alcoholic hepatitis: Elevated LFTs, down trending  Atrial fibrillation: Lopressor 5 mg IV as needed, cardiology consulted: Echo pending, Cardizem drip    Comorbid conditions:  Depression: Wellbutrin, fluoxetine  Neuropathy: Lamictal and Lyrica  Hyperlipidemia: Pravastatin  Hypertension: Lisinopril  History of SVT: Diltiazem     DVT prophylaxis: Lovenox 30 mg subcutaneous daily  GI prophylaxis:  Pepcid 20 mg IV twice daily  Disposition: Likely home  Code: Full Code   Diet: Diet NPO Exceptions are: Ice Chips   Consults: IP CONSULT TO INTERNAL MEDICINE  IP CONSULT TO PULMONOLOGY  IP CONSULT TO SOCIAL WORK  IP CONSULT TO SOCIAL WORK  PHARMACY TO DOSE VANCOMYCIN  IP CONSULT TO CARDIOLOGY     Radu Goodman MD  3/14/2022  9:30 AM       Attending Physician Statement  I have discussed the care of Amando Montero and I have examined the patient myselft and taken ros and hpi , including pertinent history and exam findings,  with the resident. I have reviewed the key elements of all parts of the encounter with the resident. I agree with the assessment, plan and orders as documented by the resident.   Diabetic ketoacidosis, poor insulin compliance,  Multifocal pneumonia,  Septic shock, poorly responded to fluids,  Metabolic encephalopathy, will get stat VBG,  Continue insulin drip at this time  Acute pancreatitis,  Underlying alcohol abuse, on [facility] protocol,  Alcoholic hepatitis,  Atrial fibrillation, echo pending, cardiology on board, Cardizem drip,  ,          Electronically signed by Estrella Brizuela MD

## 2022-03-15 LAB
ABSOLUTE EOS #: 0.1 K/UL (ref 0–0.4)
ABSOLUTE LYMPH #: 0.6 K/UL (ref 1–4.8)
ABSOLUTE MONO #: 0.4 K/UL (ref 0.1–1.3)
ALBUMIN SERPL-MCNC: 1.6 G/DL (ref 3.5–5.2)
ALP BLD-CCNC: 313 U/L (ref 35–104)
ALT SERPL-CCNC: 150 U/L (ref 5–33)
ANION GAP SERPL CALCULATED.3IONS-SCNC: 7 MMOL/L (ref 9–17)
ANION GAP SERPL CALCULATED.3IONS-SCNC: 8 MMOL/L (ref 9–17)
ANION GAP SERPL CALCULATED.3IONS-SCNC: 8 MMOL/L (ref 9–17)
ANION GAP SERPL CALCULATED.3IONS-SCNC: 9 MMOL/L (ref 9–17)
ANION GAP SERPL CALCULATED.3IONS-SCNC: 9 MMOL/L (ref 9–17)
AST SERPL-CCNC: 56 U/L
BASOPHILS # BLD: 0 % (ref 0–2)
BASOPHILS ABSOLUTE: 0 K/UL (ref 0–0.2)
BILIRUB SERPL-MCNC: 0.69 MG/DL (ref 0.3–1.2)
BILIRUBIN DIRECT: 0.37 MG/DL
BILIRUBIN, INDIRECT: 0.32 MG/DL (ref 0–1)
BUN BLDV-MCNC: 11 MG/DL (ref 6–20)
BUN BLDV-MCNC: 8 MG/DL (ref 6–20)
BUN BLDV-MCNC: 9 MG/DL (ref 6–20)
CALCIUM SERPL-MCNC: 6.9 MG/DL (ref 8.6–10.4)
CALCIUM SERPL-MCNC: 7 MG/DL (ref 8.6–10.4)
CALCIUM SERPL-MCNC: 7.1 MG/DL (ref 8.6–10.4)
CALCIUM SERPL-MCNC: 7.1 MG/DL (ref 8.6–10.4)
CALCIUM SERPL-MCNC: 7.2 MG/DL (ref 8.6–10.4)
CARBOXYHEMOGLOBIN: 1.2 % (ref 0–5)
CHLORIDE BLD-SCNC: 102 MMOL/L (ref 98–107)
CHLORIDE BLD-SCNC: 103 MMOL/L (ref 98–107)
CHLORIDE BLD-SCNC: 104 MMOL/L (ref 98–107)
CHLORIDE BLD-SCNC: 105 MMOL/L (ref 98–107)
CHLORIDE BLD-SCNC: 105 MMOL/L (ref 98–107)
CO2: 18 MMOL/L (ref 20–31)
CO2: 20 MMOL/L (ref 20–31)
CO2: 21 MMOL/L (ref 20–31)
CREAT SERPL-MCNC: 0.61 MG/DL (ref 0.5–0.9)
CREAT SERPL-MCNC: 0.66 MG/DL (ref 0.5–0.9)
CREAT SERPL-MCNC: 0.68 MG/DL (ref 0.5–0.9)
CREAT SERPL-MCNC: 0.71 MG/DL (ref 0.5–0.9)
CREAT SERPL-MCNC: 0.72 MG/DL (ref 0.5–0.9)
CULTURE: ABNORMAL
CULTURE: ABNORMAL
EOSINOPHILS RELATIVE PERCENT: 1 % (ref 0–4)
GFR AFRICAN AMERICAN: >60 ML/MIN
GFR NON-AFRICAN AMERICAN: >60 ML/MIN
GFR SERPL CREATININE-BSD FRML MDRD: ABNORMAL ML/MIN/{1.73_M2}
GLUCOSE BLD-MCNC: 144 MG/DL (ref 70–99)
GLUCOSE BLD-MCNC: 145 MG/DL (ref 65–105)
GLUCOSE BLD-MCNC: 145 MG/DL (ref 65–105)
GLUCOSE BLD-MCNC: 148 MG/DL (ref 65–105)
GLUCOSE BLD-MCNC: 148 MG/DL (ref 70–99)
GLUCOSE BLD-MCNC: 155 MG/DL (ref 65–105)
GLUCOSE BLD-MCNC: 156 MG/DL (ref 65–105)
GLUCOSE BLD-MCNC: 166 MG/DL (ref 65–105)
GLUCOSE BLD-MCNC: 168 MG/DL (ref 65–105)
GLUCOSE BLD-MCNC: 170 MG/DL (ref 65–105)
GLUCOSE BLD-MCNC: 173 MG/DL (ref 65–105)
GLUCOSE BLD-MCNC: 176 MG/DL (ref 65–105)
GLUCOSE BLD-MCNC: 184 MG/DL (ref 65–105)
GLUCOSE BLD-MCNC: 189 MG/DL (ref 70–99)
GLUCOSE BLD-MCNC: 197 MG/DL (ref 65–105)
GLUCOSE BLD-MCNC: 200 MG/DL (ref 65–105)
GLUCOSE BLD-MCNC: 207 MG/DL (ref 65–105)
GLUCOSE BLD-MCNC: 222 MG/DL (ref 65–105)
GLUCOSE BLD-MCNC: 233 MG/DL (ref 65–105)
GLUCOSE BLD-MCNC: 234 MG/DL (ref 70–99)
GLUCOSE BLD-MCNC: 241 MG/DL (ref 70–99)
GLUCOSE BLD-MCNC: 249 MG/DL (ref 65–105)
HCO3 VENOUS: 20 MMOL/L (ref 24–30)
HCT VFR BLD CALC: 33.5 % (ref 36–46)
HEMOGLOBIN: 11.2 G/DL (ref 12–16)
LIPASE: 66 U/L (ref 13–60)
LYMPHOCYTES # BLD: 9 % (ref 24–44)
MAGNESIUM: 1.8 MG/DL (ref 1.6–2.6)
MAGNESIUM: 1.9 MG/DL (ref 1.6–2.6)
MAGNESIUM: 2 MG/DL (ref 1.6–2.6)
MCH RBC QN AUTO: 29.8 PG (ref 26–34)
MCHC RBC AUTO-ENTMCNC: 33.5 G/DL (ref 31–37)
MCV RBC AUTO: 89 FL (ref 80–100)
METHEMOGLOBIN: 1.5 % (ref 0–1.9)
MONOCYTES # BLD: 5 % (ref 1–7)
MRSA, DNA, NASAL: NEGATIVE
NEGATIVE BASE EXCESS, VEN: 4.3 MMOL/L (ref 0–2)
O2 SAT, VEN: 93.2 % (ref 60–85)
PATIENT TEMP: 37
PCO2, VEN: 30.3 (ref 39–55)
PDW BLD-RTO: 14.3 % (ref 11.5–14.9)
PH VENOUS: 7.43 (ref 7.32–7.42)
PHOSPHORUS: 2.1 MG/DL (ref 2.6–4.5)
PHOSPHORUS: 2.2 MG/DL (ref 2.6–4.5)
PHOSPHORUS: 2.4 MG/DL (ref 2.6–4.5)
PHOSPHORUS: 2.5 MG/DL (ref 2.6–4.5)
PHOSPHORUS: 2.9 MG/DL (ref 2.6–4.5)
PLATELET # BLD: 65 K/UL (ref 150–450)
PMV BLD AUTO: 8.1 FL (ref 6–12)
PO2, VEN: 78.4 (ref 30–50)
POTASSIUM SERPL-SCNC: 3.6 MMOL/L (ref 3.7–5.3)
POTASSIUM SERPL-SCNC: 3.7 MMOL/L (ref 3.7–5.3)
POTASSIUM SERPL-SCNC: 3.7 MMOL/L (ref 3.7–5.3)
POTASSIUM SERPL-SCNC: 3.8 MMOL/L (ref 3.7–5.3)
POTASSIUM SERPL-SCNC: 4 MMOL/L (ref 3.7–5.3)
RBC # BLD: 3.76 M/UL (ref 4–5.2)
SEG NEUTROPHILS: 85 % (ref 36–66)
SEGMENTED NEUTROPHILS ABSOLUTE COUNT: 6.3 K/UL (ref 1.3–9.1)
SODIUM BLD-SCNC: 131 MMOL/L (ref 135–144)
SODIUM BLD-SCNC: 132 MMOL/L (ref 135–144)
SPECIMEN DESCRIPTION: ABNORMAL
SPECIMEN DESCRIPTION: NORMAL
TEXT FOR RESPIRATORY: ABNORMAL
TOTAL PROTEIN: 3.9 G/DL (ref 6.4–8.3)
VANCOMYCIN RANDOM DATE LAST DOSE: NORMAL
VANCOMYCIN RANDOM DOSE AMOUNT: NORMAL
VANCOMYCIN RANDOM TIME LAST DOSE: 530
VANCOMYCIN RANDOM: 22.7 UG/ML
WBC # BLD: 7.4 K/UL (ref 3.5–11)

## 2022-03-15 PROCEDURE — 2580000003 HC RX 258

## 2022-03-15 PROCEDURE — 6360000002 HC RX W HCPCS: Performed by: STUDENT IN AN ORGANIZED HEALTH CARE EDUCATION/TRAINING PROGRAM

## 2022-03-15 PROCEDURE — 2500000003 HC RX 250 WO HCPCS: Performed by: STUDENT IN AN ORGANIZED HEALTH CARE EDUCATION/TRAINING PROGRAM

## 2022-03-15 PROCEDURE — 99233 SBSQ HOSP IP/OBS HIGH 50: CPT | Performed by: INTERNAL MEDICINE

## 2022-03-15 PROCEDURE — 6370000000 HC RX 637 (ALT 250 FOR IP): Performed by: NURSE PRACTITIONER

## 2022-03-15 PROCEDURE — 2000000000 HC ICU R&B

## 2022-03-15 PROCEDURE — 83690 ASSAY OF LIPASE: CPT

## 2022-03-15 PROCEDURE — 80076 HEPATIC FUNCTION PANEL: CPT

## 2022-03-15 PROCEDURE — 80202 ASSAY OF VANCOMYCIN: CPT

## 2022-03-15 PROCEDURE — 6360000002 HC RX W HCPCS

## 2022-03-15 PROCEDURE — 83735 ASSAY OF MAGNESIUM: CPT

## 2022-03-15 PROCEDURE — 6370000000 HC RX 637 (ALT 250 FOR IP)

## 2022-03-15 PROCEDURE — 6370000000 HC RX 637 (ALT 250 FOR IP): Performed by: INTERNAL MEDICINE

## 2022-03-15 PROCEDURE — 2580000003 HC RX 258: Performed by: NURSE PRACTITIONER

## 2022-03-15 PROCEDURE — 80048 BASIC METABOLIC PNL TOTAL CA: CPT

## 2022-03-15 PROCEDURE — 99222 1ST HOSP IP/OBS MODERATE 55: CPT | Performed by: INTERNAL MEDICINE

## 2022-03-15 PROCEDURE — 2580000003 HC RX 258: Performed by: INTERNAL MEDICINE

## 2022-03-15 PROCEDURE — 2500000003 HC RX 250 WO HCPCS

## 2022-03-15 PROCEDURE — 84100 ASSAY OF PHOSPHORUS: CPT

## 2022-03-15 PROCEDURE — 36415 COLL VENOUS BLD VENIPUNCTURE: CPT

## 2022-03-15 PROCEDURE — 2500000003 HC RX 250 WO HCPCS: Performed by: NURSE PRACTITIONER

## 2022-03-15 PROCEDURE — 6360000002 HC RX W HCPCS: Performed by: INTERNAL MEDICINE

## 2022-03-15 PROCEDURE — 2580000003 HC RX 258: Performed by: STUDENT IN AN ORGANIZED HEALTH CARE EDUCATION/TRAINING PROGRAM

## 2022-03-15 PROCEDURE — 85025 COMPLETE CBC W/AUTO DIFF WBC: CPT

## 2022-03-15 PROCEDURE — 2500000003 HC RX 250 WO HCPCS: Performed by: INTERNAL MEDICINE

## 2022-03-15 PROCEDURE — 6370000000 HC RX 637 (ALT 250 FOR IP): Performed by: STUDENT IN AN ORGANIZED HEALTH CARE EDUCATION/TRAINING PROGRAM

## 2022-03-15 RX ORDER — INSULIN GLARGINE 100 [IU]/ML
35 INJECTION, SOLUTION SUBCUTANEOUS DAILY
Status: DISCONTINUED | OUTPATIENT
Start: 2022-03-15 | End: 2022-03-16

## 2022-03-15 RX ORDER — LEVOFLOXACIN 5 MG/ML
750 INJECTION, SOLUTION INTRAVENOUS EVERY 24 HOURS
Status: DISCONTINUED | OUTPATIENT
Start: 2022-03-15 | End: 2022-03-18

## 2022-03-15 RX ORDER — HYDROCODONE BITARTRATE AND ACETAMINOPHEN 5; 325 MG/1; MG/1
1 TABLET ORAL EVERY 6 HOURS PRN
Status: DISCONTINUED | OUTPATIENT
Start: 2022-03-15 | End: 2022-03-19 | Stop reason: HOSPADM

## 2022-03-15 RX ADMIN — CEFEPIME HYDROCHLORIDE: 2 INJECTION, POWDER, FOR SOLUTION INTRAVENOUS at 10:19

## 2022-03-15 RX ADMIN — PRAVASTATIN SODIUM 40 MG: 40 TABLET ORAL at 10:09

## 2022-03-15 RX ADMIN — THIAMINE HYDROCHLORIDE 50 MG: 100 INJECTION, SOLUTION INTRAMUSCULAR; INTRAVENOUS at 01:00

## 2022-03-15 RX ADMIN — HYDROCODONE BITARTRATE AND ACETAMINOPHEN 1 TABLET: 5; 325 TABLET ORAL at 01:26

## 2022-03-15 RX ADMIN — BUPROPION HYDROCHLORIDE 300 MG: 300 TABLET, FILM COATED, EXTENDED RELEASE ORAL at 10:09

## 2022-03-15 RX ADMIN — FLUOXETINE HYDROCHLORIDE 20 MG: 20 CAPSULE ORAL at 10:09

## 2022-03-15 RX ADMIN — POTASSIUM CHLORIDE 10 MEQ: 10 INJECTION, SOLUTION INTRAVENOUS at 02:56

## 2022-03-15 RX ADMIN — POTASSIUM CHLORIDE 10 MEQ: 10 INJECTION, SOLUTION INTRAVENOUS at 03:58

## 2022-03-15 RX ADMIN — PREGABALIN 50 MG: 25 CAPSULE ORAL at 20:43

## 2022-03-15 RX ADMIN — SODIUM CHLORIDE, PRESERVATIVE FREE 5 ML: 5 INJECTION INTRAVENOUS at 20:30

## 2022-03-15 RX ADMIN — FAMOTIDINE 20 MG: 10 INJECTION, SOLUTION INTRAVENOUS at 07:31

## 2022-03-15 RX ADMIN — HYDROCODONE BITARTRATE AND ACETAMINOPHEN 1 TABLET: 5; 325 TABLET ORAL at 07:31

## 2022-03-15 RX ADMIN — DILTIAZEM HYDROCHLORIDE 30 MG: 30 TABLET, FILM COATED ORAL at 17:45

## 2022-03-15 RX ADMIN — LORAZEPAM 2 MG: 2 INJECTION INTRAMUSCULAR; INTRAVENOUS at 20:43

## 2022-03-15 RX ADMIN — METRONIDAZOLE 500 MG: 500 INJECTION, SOLUTION INTRAVENOUS at 00:59

## 2022-03-15 RX ADMIN — LORAZEPAM 1 MG: 2 INJECTION INTRAMUSCULAR; INTRAVENOUS at 07:32

## 2022-03-15 RX ADMIN — VANCOMYCIN HYDROCHLORIDE 1500 MG: 1.5 INJECTION, POWDER, LYOPHILIZED, FOR SOLUTION INTRAVENOUS at 05:33

## 2022-03-15 RX ADMIN — INSULIN LISPRO 6 UNITS: 100 INJECTION, SOLUTION INTRAVENOUS; SUBCUTANEOUS at 17:45

## 2022-03-15 RX ADMIN — SODIUM PHOSPHATE, MONOBASIC, MONOHYDRATE AND SODIUM PHOSPHATE, DIBASIC, ANHYDROUS 15 MMOL: 276; 142 INJECTION, SOLUTION INTRAVENOUS at 03:36

## 2022-03-15 RX ADMIN — FAMOTIDINE 20 MG: 10 INJECTION, SOLUTION INTRAVENOUS at 20:43

## 2022-03-15 RX ADMIN — SODIUM PHOSPHATE, MONOBASIC, MONOHYDRATE AND SODIUM PHOSPHATE, DIBASIC, ANHYDROUS 15 MMOL: 276; 142 INJECTION, SOLUTION INTRAVENOUS at 12:49

## 2022-03-15 RX ADMIN — PREGABALIN 50 MG: 25 CAPSULE ORAL at 14:55

## 2022-03-15 RX ADMIN — LEVOFLOXACIN 750 MG: 5 INJECTION, SOLUTION INTRAVENOUS at 17:47

## 2022-03-15 RX ADMIN — POTASSIUM CHLORIDE 10 MEQ: 10 INJECTION, SOLUTION INTRAVENOUS at 05:32

## 2022-03-15 RX ADMIN — ENOXAPARIN SODIUM 120 MG: 150 INJECTION SUBCUTANEOUS at 07:31

## 2022-03-15 RX ADMIN — LAMOTRIGINE 200 MG: 100 TABLET ORAL at 10:11

## 2022-03-15 RX ADMIN — ENOXAPARIN SODIUM 120 MG: 150 INJECTION SUBCUTANEOUS at 20:44

## 2022-03-15 RX ADMIN — ANTI-FUNGAL POWDER MICONAZOLE NITRATE TALC FREE: 1.42 POWDER TOPICAL at 10:11

## 2022-03-15 RX ADMIN — HYDROCODONE BITARTRATE AND ACETAMINOPHEN 1 TABLET: 5; 325 TABLET ORAL at 14:55

## 2022-03-15 RX ADMIN — INSULIN GLARGINE 35 UNITS: 100 INJECTION, SOLUTION SUBCUTANEOUS at 10:09

## 2022-03-15 RX ADMIN — DILTIAZEM HYDROCHLORIDE 10 MG/HR: 5 INJECTION, SOLUTION INTRAVENOUS at 07:47

## 2022-03-15 RX ADMIN — INSULIN LISPRO 2 UNITS: 100 INJECTION, SOLUTION INTRAVENOUS; SUBCUTANEOUS at 20:44

## 2022-03-15 RX ADMIN — PREGABALIN 50 MG: 25 CAPSULE ORAL at 10:18

## 2022-03-15 RX ADMIN — METRONIDAZOLE 500 MG: 500 INJECTION, SOLUTION INTRAVENOUS at 07:49

## 2022-03-15 RX ADMIN — ANTI-FUNGAL POWDER MICONAZOLE NITRATE TALC FREE: 1.42 POWDER TOPICAL at 21:30

## 2022-03-15 RX ADMIN — HYDROCODONE BITARTRATE AND ACETAMINOPHEN 1 TABLET: 5; 325 TABLET ORAL at 21:22

## 2022-03-15 RX ADMIN — CEFEPIME HYDROCHLORIDE 2000 MG: 2 INJECTION, POWDER, FOR SOLUTION INTRAVENOUS at 00:13

## 2022-03-15 RX ADMIN — LORAZEPAM 2 MG: 2 INJECTION INTRAMUSCULAR; INTRAVENOUS at 13:03

## 2022-03-15 ASSESSMENT — PAIN DESCRIPTION - FREQUENCY
FREQUENCY: CONTINUOUS

## 2022-03-15 ASSESSMENT — PAIN DESCRIPTION - PAIN TYPE
TYPE: CHRONIC PAIN

## 2022-03-15 ASSESSMENT — ENCOUNTER SYMPTOMS
NAUSEA: 0
EYE REDNESS: 0
CHEST TIGHTNESS: 0
ABDOMINAL PAIN: 0
VOMITING: 0
SHORTNESS OF BREATH: 0
BACK PAIN: 0
WHEEZING: 0
SORE THROAT: 0
COLOR CHANGE: 0

## 2022-03-15 ASSESSMENT — PAIN SCALES - GENERAL
PAINLEVEL_OUTOF10: 7
PAINLEVEL_OUTOF10: 0
PAINLEVEL_OUTOF10: 6
PAINLEVEL_OUTOF10: 2
PAINLEVEL_OUTOF10: 3
PAINLEVEL_OUTOF10: 3
PAINLEVEL_OUTOF10: 1
PAINLEVEL_OUTOF10: 6
PAINLEVEL_OUTOF10: 7
PAINLEVEL_OUTOF10: 1
PAINLEVEL_OUTOF10: 0
PAINLEVEL_OUTOF10: 6

## 2022-03-15 ASSESSMENT — PAIN DESCRIPTION - ONSET
ONSET: ON-GOING

## 2022-03-15 ASSESSMENT — PAIN - FUNCTIONAL ASSESSMENT
PAIN_FUNCTIONAL_ASSESSMENT: PREVENTS OR INTERFERES SOME ACTIVE ACTIVITIES AND ADLS
PAIN_FUNCTIONAL_ASSESSMENT: PREVENTS OR INTERFERES WITH MANY ACTIVE NOT PASSIVE ACTIVITIES
PAIN_FUNCTIONAL_ASSESSMENT: PREVENTS OR INTERFERES SOME ACTIVE ACTIVITIES AND ADLS

## 2022-03-15 ASSESSMENT — PAIN DESCRIPTION - LOCATION
LOCATION: BACK

## 2022-03-15 ASSESSMENT — PAIN DESCRIPTION - PROGRESSION
CLINICAL_PROGRESSION: NOT CHANGED
CLINICAL_PROGRESSION: GRADUALLY IMPROVING
CLINICAL_PROGRESSION: NOT CHANGED

## 2022-03-15 ASSESSMENT — PAIN DESCRIPTION - DESCRIPTORS
DESCRIPTORS: ACHING
DESCRIPTORS: ACHING
DESCRIPTORS: SORE
DESCRIPTORS: ACHING

## 2022-03-15 ASSESSMENT — PAIN DESCRIPTION - ORIENTATION
ORIENTATION: LOWER

## 2022-03-15 NOTE — PROGRESS NOTES
[facility] CARDIOLOGY Progress Note    3/15/2022 8:18 AM      Subjective:  Ms. Rudolph German  Is more awake and alert and giving good history. Patient  denies any chest pain or shortness of breath or palpitations or lightheadedness or dizziness. Admits to drinking alcohol off late. Prior history of essential hypertension, hyperlipidemia, diabetes mellitus. No current tobacco abuse. No prior CAD or angina. Review of systems:  No fever or chills. No diarrhea. No headaches. LABS:     Recent Results (from the past 24 hour(s))   Lactic Acid    Collection Time: 03/14/22  8:42 AM   Result Value Ref Range    Lactic Acid 2.2 0.5 - 2.2 mmol/L   POC Glucose Fingerstick    Collection Time: 03/14/22  9:14 AM   Result Value Ref Range    POC Glucose 148 (H) 65 - 105 mg/dL   POC Glucose Fingerstick    Collection Time: 03/14/22 11:00 AM   Result Value Ref Range    POC Glucose 242 (H) 65 - 105 mg/dL   Legionella Ag, Ur    Collection Time: 03/14/22 11:40 AM    Specimen: Urine, clean catch   Result Value Ref Range    Legionella Pneumophilia Ag, Urine NEGATIVE NEGATIVE   Respiratory Panel, Molecular, with COVID-19 (Restricted: peds pts or suitable admitted adults)    Collection Time: 03/14/22 11:40 AM    Specimen: Nasopharyngeal Swab   Result Value Ref Range    Specimen Description . NASOPHARYNGEAL SWAB     Adenovirus PCR Not Detected Not Detected    Coronavirus 229E PCR Not Detected Not Detected    Coronavirus HKU1 PCR Not Detected Not Detected    Coronavirus NL63 PCR Not Detected Not Detected    Coronavirus OC43 PCR Not Detected Not Detected    SARS-CoV-2, PCR Not Detected Not Detected    Human Metapneumovirus PCR Not Detected Not Detected    Rhino/Enterovirus PCR Not Detected Not Detected    Influenza A by PCR Not Detected Not Detected    Influenza B by PCR Not Detected Not Detected    Parainfluenza 1 PCR Not Detected Not Detected    Parainfluenza 2 PCR Not Detected Not Detected    Parainfluenza 3 PCR Not Detected Not Detected    Parainfluenza 4 PCR Not Detected Not Detected    Resp Syncytial Virus PCR Not Detected Not Detected    Bordetella Parapertussis Not Detected Not Detected    B Pertussis by PCR Not Detected Not Detected    Chlamydia pneumoniae By PCR Not Detected Not Detected    Mycoplasma pneumo by PCR Not Detected Not Detected   Basic Metabolic Panel    Collection Time: 03/14/22 12:13 PM   Result Value Ref Range    Glucose 280 (H) 70 - 99 mg/dL    BUN 16 6 - 20 mg/dL    CREATININE 0.87 0.50 - 0.90 mg/dL    Calcium 6.8 (L) 8.6 - 10.4 mg/dL    Sodium 133 (L) 135 - 144 mmol/L    Potassium 3.7 3.7 - 5.3 mmol/L    Chloride 106 98 - 107 mmol/L    CO2 17 (L) 20 - 31 mmol/L    Anion Gap 10 9 - 17 mmol/L    GFR Non-African American >60 >60 mL/min    GFR African American >60 >60 mL/min    GFR Comment         Magnesium    Collection Time: 03/14/22 12:13 PM   Result Value Ref Range    Magnesium 1.9 1.6 - 2.6 mg/dL   Phosphorus    Collection Time: 03/14/22 12:13 PM   Result Value Ref Range    Phosphorus 2.0 (L) 2.6 - 4.5 mg/dL   POC Glucose Fingerstick    Collection Time: 03/14/22  2:57 PM   Result Value Ref Range    POC Glucose 186 (H) 65 - 105 mg/dL   POC Glucose Fingerstick    Collection Time: 03/14/22  3:52 PM   Result Value Ref Range    POC Glucose 196 (H) 65 - 105 mg/dL   POC Glucose Fingerstick    Collection Time: 03/14/22  5:12 PM   Result Value Ref Range    POC Glucose 190 (H) 65 - 105 mg/dL   Basic Metabolic Panel    Collection Time: 03/14/22  5:49 PM   Result Value Ref Range    Glucose 366 (H) 70 - 99 mg/dL    BUN 13 6 - 20 mg/dL    CREATININE 0.80 0.50 - 0.90 mg/dL    Calcium 6.8 (L) 8.6 - 10.4 mg/dL    Sodium 131 (L) 135 - 144 mmol/L    Potassium 3.7 3.7 - 5.3 mmol/L    Chloride 103 98 - 107 mmol/L    CO2 18 (L) 20 - 31 mmol/L    Anion Gap 10 9 - 17 mmol/L    GFR Non-African American >60 >60 mL/min    GFR African American >60 >60 mL/min    GFR Comment         Magnesium    Collection Time: 03/14/22  5:49 PM   Result Value Ref Range    Magnesium 2.3 1.6 - 2.6 mg/dL   Phosphorus    Collection Time: 03/14/22  5:49 PM   Result Value Ref Range    Phosphorus 6.2 (H) 2.6 - 4.5 mg/dL   POC Glucose Fingerstick    Collection Time: 03/14/22  6:17 PM   Result Value Ref Range    POC Glucose 178 (H) 65 - 105 mg/dL   POC Glucose Fingerstick    Collection Time: 03/14/22  7:26 PM   Result Value Ref Range    POC Glucose 201 (H) 65 - 105 mg/dL   Phosphorus    Collection Time: 03/14/22  7:50 PM   Result Value Ref Range    Phosphorus 1.9 (L) 2.6 - 4.5 mg/dL   BLOOD GAS, VENOUS    Collection Time: 03/14/22  8:22 PM   Result Value Ref Range    pH, Bryant 7.428 (H) 7.320 - 7.420    pCO2, Bryant 30.3 (L) 39.0 - 55.0    pO2, Bryant 78.4 (H) 30.0 - 50.0    HCO3, Venous 20.0 (L) 24.0 - 30.0 mmol/L    Negative Base Excess, Bryant 4.3 (H) 0.0 - 2.0 mmol/L    O2 Sat, Bryant 93.2 (H) 60.0 - 85.0 %    Carboxyhemoglobin 1.2 0 - 5 %    Methemoglobin 1.5 0.0 - 1.9 %    Pt Temp 37     Text for Respiratory RESULTS GIVEN TO RN    POC Glucose Fingerstick    Collection Time: 03/14/22  8:30 PM   Result Value Ref Range    POC Glucose 190 (H) 65 - 105 mg/dL   POC Glucose Fingerstick    Collection Time: 03/14/22  9:32 PM   Result Value Ref Range    POC Glucose 173 (H) 65 - 105 mg/dL   Basic Metabolic Panel    Collection Time: 03/15/22 12:29 AM   Result Value Ref Range    Glucose 189 (H) 70 - 99 mg/dL    BUN 11 6 - 20 mg/dL    CREATININE 0.71 0.50 - 0.90 mg/dL    Calcium 7.0 (L) 8.6 - 10.4 mg/dL    Sodium 131 (L) 135 - 144 mmol/L    Potassium 3.8 3.7 - 5.3 mmol/L    Chloride 103 98 - 107 mmol/L    CO2 20 20 - 31 mmol/L    Anion Gap 8 (L) 9 - 17 mmol/L    GFR Non-African American >60 >60 mL/min    GFR African American >60 >60 mL/min    GFR Comment         Magnesium    Collection Time: 03/15/22 12:29 AM   Result Value Ref Range    Magnesium 2.0 1.6 - 2.6 mg/dL   Phosphorus    Collection Time: 03/15/22 12:29 AM   Result Value Ref Range    Phosphorus 2.1 (L) 2.6 - 4.5 mg/dL   CBC with Auto Differential    Collection Time: 03/15/22  6:09 AM   Result Value Ref Range    WBC 7.4 3.5 - 11.0 k/uL    RBC 3.76 (L) 4.0 - 5.2 m/uL    Hemoglobin 11.2 (L) 12.0 - 16.0 g/dL    Hematocrit 33.5 (L) 36 - 46 %    MCV 89.0 80 - 100 fL    MCH 29.8 26 - 34 pg    MCHC 33.5 31 - 37 g/dL    RDW 14.3 11.5 - 14.9 %    Platelets 65 (L) 080 - 450 k/uL    MPV 8.1 6.0 - 12.0 fL    Seg Neutrophils 85 (H) 36 - 66 %    Lymphocytes 9 (L) 24 - 44 %    Monocytes 5 1 - 7 %    Eosinophils % 1 0 - 4 %    Basophils 0 0 - 2 %    Segs Absolute 6.30 1.3 - 9.1 k/uL    Absolute Lymph # 0.60 (L) 1.0 - 4.8 k/uL    Absolute Mono # 0.40 0.1 - 1.3 k/uL    Absolute Eos # 0.10 0.0 - 0.4 k/uL    Basophils Absolute 0.00 0.0 - 0.2 k/uL   Hepatic Function Panel    Collection Time: 03/15/22  6:09 AM   Result Value Ref Range    Albumin 1.6 (L) 3.5 - 5.2 g/dL    Alkaline Phosphatase 313 (H) 35 - 104 U/L     (H) 5 - 33 U/L    AST 56 (H) <32 U/L    Total Bilirubin 0.69 0.3 - 1.2 mg/dL    Bilirubin, Direct 0.37 (H) <0.31 mg/dL    Bilirubin, Indirect 0.32 0.00 - 1.00 mg/dL    Total Protein 3.9 (L) 6.4 - 8.3 g/dL   Lipase    Collection Time: 03/15/22  6:09 AM   Result Value Ref Range    Lipase 66 (H) 13 - 60 U/L   Basic Metabolic Panel    Collection Time: 03/15/22  6:09 AM   Result Value Ref Range    Glucose 144 (H) 70 - 99 mg/dL    BUN 9 6 - 20 mg/dL    CREATININE 0.66 0.50 - 0.90 mg/dL    Calcium 6.9 (L) 8.6 - 10.4 mg/dL    Sodium 132 (L) 135 - 144 mmol/L    Potassium 3.7 3.7 - 5.3 mmol/L    Chloride 105 98 - 107 mmol/L    CO2 20 20 - 31 mmol/L    Anion Gap 7 (L) 9 - 17 mmol/L    GFR Non-African American >60 >60 mL/min    GFR African American >60 >60 mL/min    GFR Comment         Magnesium    Collection Time: 03/15/22  6:09 AM   Result Value Ref Range    Magnesium 1.9 1.6 - 2.6 mg/dL   Phosphorus    Collection Time: 03/15/22  6:09 AM   Result Value Ref Range    Phosphorus 2.5 (L) 2.6 - 4.5 mg/dL       Pulse Ox:  SpO2  Av.5 %  Min: 89 %  Max: 99 %    Supplemental O2: O2 Flow Rate (L/min): 2 L/min     Current Meds:    IVPB builder   IntraVENous Q8H    vancomycin  1,500 mg IntraVENous Q18H    metroNIDAZOLE  500 mg IntraVENous Q8H    enoxaparin  1 mg/kg SubCUTAneous BID    miconazole   Topical BID    lamoTRIgine  200 mg Oral Daily    pregabalin  50 mg Oral TID    buPROPion  300 mg Oral QAM    FLUoxetine  20 mg Oral Daily    pravastatin  40 mg Oral Daily    [Held by provider] lisinopril  20 mg Oral Daily    dilTIAZem  240 mg Oral Daily    vancomycin (VANCOCIN) intermittent dosing (placeholder)   Other RX Placeholder    sodium chloride flush  5-40 mL IntraVENous 2 times per day    famotidine (PEPCID) injection  20 mg IntraVENous BID    sodium chloride flush  5-40 mL IntraVENous 2 times per day         Continuous Infusions:    dilTIAZem 10 mg/hr (03/15/22 0747)    dextrose      insulin 0.013 Units/kg/hr (03/15/22 0720)    0.9% NaCl with KCl 20 mEq Stopped (03/13/22 1900)    sodium chloride      dextrose 5 % and 0.45 % NaCl 150 mL/hr at 03/14/22 1503    sodium chloride                VITAL SIGNS:    /63   Pulse 107   Temp 98.2 °F (36.8 °C) (Axillary)   Resp 18   Ht 5' 8\" (1.727 m)   Wt 280 lb (127 kg)   SpO2 91%   BMI 42.57 kg/m²  2 L/min      Admit Weight:  280 lb (127 kg)    Last 3 weights: Wt Readings from Last 3 Encounters:   03/12/22 280 lb (127 kg)   02/01/22 280 lb (127 kg)   12/31/21 280 lb (127 kg)       BMI: Body mass index is 42.57 kg/m². INPUT/OUTPUT:          Intake/Output Summary (Last 24 hours) at 3/15/2022 0818  Last data filed at 3/15/2022 0615  Gross per 24 hour   Intake 6500.25 ml   Output 8350 ml   Net -1849.75 ml         Telemetry shows  Sinus. EXAM:     General appearance: awake, alert. Laying in bed comfortably. Pleasant. Neck: No JVD or thyromegaly  Chest: clear bilaterally. No tenderness. No rhonchi or wheezing. Cardiac: Regular rate and rhythm.   No significant murmur or gallop or rubs.  Abdomen: soft, non-tender. Extremities: no cyanosis, no clubbing, no calf tenderness, no leg edema. Pulses: intact bilateral radial pulses. Skin:  warm and dry. Neuro:  Able to move all 4 extremities. 2 D ECHOCARDIOGRAM 3/14/2022:     Summary   Normal left ventricle size and function with an estimated EF > 55%. No segmental wall motion abnormalities seen. Mild left ventricular hypertrophy. Normal right ventricular size and function. No significant valvular regurgitation or stenosis seen. No significant pericardial effusion is seen. ASSESSMENT:    Paroxysmal atrial fibrillation /flutter with RVR. Suspect supraventricular tachycardia reported is atrial flutter with 2:1 AV conduction  03/14/2022 AM upon review of rhythm strips. Currently sinus tachycardia. Low CHADS2 Vasc risk score. Normal LV systolic function.     Diabetic ketoacidosis. Improved. Essential hypertension. Hyperlipidemia.     History of alcohol abuse.       Other problems as charted.       REC/PLAN:    Improved mentation and more awake and alert giving good history. Wean off IV diltiazem and switch over to oral diltiazem 60 mg P work Q 6 or 8 hours with holding parameters, if patient can keep taking orally. Continue on other supportive care as you are doing. Discussed with the nurse at bedside at the time of my evaluation in the ICU this morning. Will follow. Electronically signed by Karen Arnold MD, [facility]        PLEASE NOTE:  This progress note was completed using a voice transcription system. Every effort was made to ensure accuracy. However, inadvertent computerized transcription errors may be present.

## 2022-03-15 NOTE — PROGRESS NOTES
Dr. Rodriguez Heal at bedside to assess, this RN updated MD on pt status, this RN to attempt to get off of cardizem gtt. Once off of cardizem okay to restart oral cadizem 30MG Q 6 hours.

## 2022-03-15 NOTE — PROGRESS NOTES
Med resident at bedside to assess, this RN updated MD on pt status and plans to give lantus and bridge and increase diet. See orders.

## 2022-03-15 NOTE — PROGRESS NOTES
Physician Progress Note      PATIENT:               Mary Steiner  CSN #:                  [id number]  :                       1980  ADMIT DATE:       3/12/2022 7:42 AM  DISCH DATE:  RESPONDING  PROVIDER #:        Mariana BURCH          QUERY TEXT:    Pt admitted with DKA and alcohol intoxication. Pt noted to have severe sepsis   with shock documented in 3/13 and 3/14 Medicine PNs. If possible, please   document in progress notes the present on admission status of severe sepsis   with shock: The medical record reflects the following:  Risk Factors: 42 y.o. female with extensive PMH, admitted for DKA and alcohol   intoxication. Clinical Indicators: In the setting of above risk factors, pt has sepsis   documented in 3/13 Medicine PN. 3/14 Medicine PN states, Multifocal pneumonia,   Septic shock, poorly responded to fluids, Pt noted to have acute   pancreatitis, UTI, and pneumonia documented as potential sources of infection. GAIL present. CXR 3/13: Lt basilar opacities persist and appear mildly   worsened, concerning for developing infection. Aspiration may have a similar   appearance. CT Abd/Pelvis 3/13: 1. Multifocal pneumonia, most pronounced in the   Lt lower lobe.  2.Inflammatory changes surrounding the pancr  Treatment: 1,000 ml bolus x 6 w/ IVFs at 200 ml/ hr. Maxipime IV, Flagyl IV,   Vanco IV  Options provided:  -- Severe sepsis with shock was present at the time of the order to admit to   the hospital  -- Severe sepsis with shock was developing at the time of the order to admit   to the hospital  -- Severe sepsis with shock was not present or developing at the time of the   order to admit to the hospital  -- Other - I will add my own diagnosis  -- Disagree - Not applicable / Not valid  -- Disagree - Clinically unable to determine / Unknown  -- Refer to Clinical Documentation Reviewer    PROVIDER RESPONSE TEXT:    Severe sepsis with shock was developing at the time of the order to admit to   the [facility].    Query created by: Gisela Hernandez on 3/15/2022 10:17 AM      Electronically signed by:  Alyce BURCH 3/15/2022 1:13 PM

## 2022-03-15 NOTE — PROGRESS NOTES
Pt c/o back pain. Pt states back pain is chronic and she takes Vicodin at home. Writer spoke with Liborio Iverson CNP. Orders received.  See orders

## 2022-03-15 NOTE — PROGRESS NOTES
[facility]    PROGRESS NOTE             3/15/2022    7:44 AM    Name:   Charley Ramirez  MRN:     [id number]     Acct:      [de-identified]   Room:   2009/2009-01  IP Day:  3  Admit Date:  3/12/2022  7:42 AM    PCP:  Butch Carrillo  Code Status:  Full Code    Subjective:     C/C:   Chief Complaint   Patient presents with    Alcohol Intoxication     Interval History Status: improved. No acute events overnight. Patient seen at bedside this morning. Patient tolerating clear liquid diet with no nausea vomiting. Patient denies any abdominal pain, chest pain, or shortness of breath at this time. Patient understands that her situation has been precipitated by her alcohol intake and is committed to abstaining. She agrees to the plan of care. Brief History:     Patient is a 79-year-old female with medical history of anxiety, bipolar disorder, history of pulmonary embolism, hypertension, iron deficiency anemia, suicidal ideation, type 2 diabetes mellitus presents with chief complaint of alcohol intoxication. Patient reports that she has not been taking insulin for the last few days as she \"forgot\". Patient was quite somnolent at time of exam.  She did not report any significant pain and significant alcohol use last night.     In the ED, patient was found to be tachycardic and hypoxic. Patient was placed on 2 L of oxygen via nasal cannula. Patient's VBG's were remarkable for pH of 7.187. Other remarkable labs include sodium of 111, creatinine of 1.50, lactic acid of 10.2, glucose of 1255, alkaline phosphatase of 411, ALT of 504, AST of 2478, ethanol level of 404.   Remarkable studies include:  · Chest x-ray showing diffuse interstitial opacities in the perihilar areas consistent with multi focal airspace disease  · CT head without contrast showing no acute intracranial abnormality     Patient was treated in the ED with fluids and insulin continuous infusion. Patient was placed on DKA protocol and decision was made to admit the patient to the ICU with consults in place for pulmonology critical care. Review of Systems:     Review of Systems   Constitutional: Negative for appetite change, chills, fatigue and fever. HENT: Negative for congestion and sore throat. Eyes: Negative for redness. Respiratory: Negative for chest tightness, shortness of breath and wheezing. Cardiovascular: Negative for chest pain, palpitations and leg swelling. Gastrointestinal: Negative for abdominal pain, nausea and vomiting. Genitourinary: Negative for dysuria and hematuria. Musculoskeletal: Negative for back pain. Skin: Negative for color change. Neurological: Negative for dizziness, weakness, light-headedness and headaches. Psychiatric/Behavioral: Negative for confusion and decreased concentration. Medications:      Allergies:  Asa [aspirin], Betadine [povidone iodine], Celexa [citalopram hydrobromide], Citalopram, Lasix [furosemide], Macrobid [nitrofurantoin], Norco [hydrocodone-acetaminophen], Betadine [povidone iodine], Codeine, Lithium, Paroxetine, Paxil [paroxetine hcl], Tape [adhesive tape], and Tegretol [carbamazepine]    Current Meds:   Scheduled Meds:    IVPB builder   IntraVENous Q8H    vancomycin  1,500 mg IntraVENous Q18H    metroNIDAZOLE  500 mg IntraVENous Q8H    enoxaparin  1 mg/kg SubCUTAneous BID    miconazole   Topical BID    lamoTRIgine  200 mg Oral Daily    pregabalin  50 mg Oral TID    buPROPion  300 mg Oral QAM    FLUoxetine  20 mg Oral Daily    pravastatin  40 mg Oral Daily    [Held by provider] lisinopril  20 mg Oral Daily    dilTIAZem  240 mg Oral Daily    vancomycin (VANCOCIN) intermittent dosing (placeholder)   Other RX Placeholder    sodium chloride flush  5-40 mL IntraVENous 2 times per day    famotidine (PEPCID) injection  20 mg IntraVENous BID    sodium chloride flush  5-40 mL IntraVENous 2 times per day     Continuous Infusions:    dilTIAZem 15 mg/hr (03/14/22 2236)    dextrose      insulin 0.013 Units/kg/hr (03/15/22 0720)    0.9% NaCl with KCl 20 mEq Stopped (03/13/22 1900)    sodium chloride      dextrose 5 % and 0.45 % NaCl 150 mL/hr at 03/14/22 1503    sodium chloride       PRN Meds: HYDROcodone 5 mg - acetaminophen, perflutren lipid microspheres, metoprolol, sodium chloride flush, glucose, dextrose, glucagon (rDNA), dextrose, potassium chloride, magnesium sulfate, sodium phosphate IVPB **OR** sodium phosphate IVPB **OR** sodium phosphate IVPB, polyethylene glycol, dextrose 5 % and 0.45 % NaCl, sodium chloride flush, sodium chloride, ondansetron **OR** ondansetron, acetaminophen **OR** acetaminophen, LORazepam **OR** LORazepam **OR** LORazepam **OR** LORazepam **OR** LORazepam **OR** LORazepam **OR** LORazepam **OR** LORazepam    Data:     Past Medical History:    Past Medical History:   Diagnosis Date    Alcohol abuse     sober since5    Anxiety     Arthritis     Painting esophagus     Benzodiazepine overdose 9/26/2015    [facility] disorder, unspecified [facility]     Bipolar I disorder, most recent episode depressed, severe without psychotic features ([facility]     Cellulitis 11/17/2018    Chronic abdominal wound infection 9/27/2015    Constipation 9/3/2019    Depression 7/12/2015    Drug overdose, intentional ([facility] 7/12/2015    Genital herpes, unspecified     GERD (gastroesophageal reflux disease)     History of pulmonary embolism     Hx of blood clots dx 2 years ago    clots in both legs and lungs     Hypertension     Iron deficiency anemia     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    Lumbosacral spondylosis without myelopathy     MDRO (multiple drug resistant organisms) resistance 2010    MRSA (abd)    Miscarriage     multiple, around 7th mos pregnant each time d/t hypercoagulation    Morbid obesity ([facility]     MRSA (methicillin resistant staph aureus) culture positive 02/10/2017    urine    Overdose of benzodiazepine     Pernicious anemia     Primary hypercoagulable state ([facility]     antiphospholipid antibodies on Arixtra shots daily    [facility]     Suicidal behavior 2015    Suicidal ideation     Su[facility] 2014    hx OD on painkillers and rubbing alcohol    SVT (supraventricular tachycardia) ([facility] 2017    Toxic effect of ethanol, intentional self-harm ([facility] 2015    Type 2 diabetes mellitus, with long-term current use of insulin ([facility]        Social History:    Tobacco:   Patient  reports that she has never smoked. She has never used smokeless tobacco.  Alcohol:     Patient  reports no history of alcohol use. Drug Use: Patient  reports no history of drug use. Family History:   Family History   Problem Relation Age of Onset    Depression Mother     High Blood Pressure Mother     High Cholesterol Mother     Diabetes Father     Heart Disease Father     Kidney Disease Father     High Blood Pressure Father     High Cholesterol Father     Cancer Maternal Uncle         of duodenum had whipple    Breast Cancer Maternal Aunt     Breast Cancer Maternal Cousin        Vitals:  /63   Pulse 107   Temp 98.2 °F (36.8 °C) (Axillary)   Resp 18   Ht 5' 8\" (1.727 m)   Wt 280 lb (127 kg)   SpO2 91%   BMI 42.57 kg/m²   Temp (24hrs), Av.3 °F (37.4 °C), Min:97.8 °F (36.6 °C), Max:101 °F (38.3 °C)    Vitals:    03/15/22 0630 03/15/22 0700 03/15/22 0718 03/15/22 0722   BP: (!) 106/58 (!) 112/51 110/63 110/63   Pulse: 101 101 108 107   Resp:     Temp:       TempSrc:       SpO2: 91% 92% 91%    Weight:       Height:           O2 Requirements: 2 L oxygen via nasal cannula    Lines/Drains/Access: Peripheral IV, PICC double-lumen, urethral catheter    I/O(24Hr):     Intake/Output Summary (Last 24 hours) at 3/15/2022 0744  Last data filed at 3/15/2022 0615  Gross per 24 hour   Intake 6500.25 ml Output 9800 ml   Net -3299.75 ml       Labs:  Recent Results (from the past 24 hour(s))   Basic Metabolic Panel    Collection Time: 03/14/22  8:08 AM   Result Value Ref Range    Glucose 170 (H) 70 - 99 mg/dL    BUN 19 6 - 20 mg/dL    CREATININE 0.96 (H) 0.50 - 0.90 mg/dL    Calcium 6.6 (L) 8.6 - 10.4 mg/dL    Sodium 134 (L) 135 - 144 mmol/L    Potassium 3.7 3.7 - 5.3 mmol/L    Chloride 107 98 - 107 mmol/L    CO2 17 (L) 20 - 31 mmol/L    Anion Gap 10 9 - 17 mmol/L    GFR Non-African American >60 >60 mL/min    GFR African American >60 >60 mL/min    GFR Comment         Magnesium    Collection Time: 03/14/22  8:08 AM   Result Value Ref Range    Magnesium 1.8 1.6 - 2.6 mg/dL   Phosphorus    Collection Time: 03/14/22  8:08 AM   Result Value Ref Range    Phosphorus 2.2 (L) 2.6 - 4.5 mg/dL   Procalcitonin    Collection Time: 03/14/22  8:08 AM   Result Value Ref Range    Procalcitonin 0.47 (H) <0.09 ng/mL   POC Glucose Fingerstick    Collection Time: 03/14/22  8:14 AM   Result Value Ref Range    POC Glucose 147 (H) 65 - 105 mg/dL   Lactic Acid    Collection Time: 03/14/22  8:42 AM   Result Value Ref Range    Lactic Acid 2.2 0.5 - 2.2 mmol/L   POC Glucose Fingerstick    Collection Time: 03/14/22  9:14 AM   Result Value Ref Range    POC Glucose 148 (H) 65 - 105 mg/dL   POC Glucose Fingerstick    Collection Time: 03/14/22 11:00 AM   Result Value Ref Range    POC Glucose 242 (H) 65 - 105 mg/dL   Legionella Ag, Ur    Collection Time: 03/14/22 11:40 AM    Specimen: Urine, clean catch   Result Value Ref Range    Legionella Pneumophilia Ag, Urine NEGATIVE NEGATIVE   Respiratory Panel, Molecular, with COVID-19 (Restricted: peds pts or suitable admitted adults)    Collection Time: 03/14/22 11:40 AM    Specimen: Nasopharyngeal Swab   Result Value Ref Range    Specimen Description . NASOPHARYNGEAL SWAB     Adenovirus PCR Not Detected Not Detected    Coronavirus 229E PCR Not Detected Not Detected    Coronavirus HKU1 PCR Not Detected Not Detected    Coronavirus NL63 PCR Not Detected Not Detected    Coronavirus OC43 PCR Not Detected Not Detected    SARS-CoV-2, PCR Not Detected Not Detected    Human Metapneumovirus PCR Not Detected Not Detected    Rhino/Enterovirus PCR Not Detected Not Detected    Influenza A by PCR Not Detected Not Detected    Influenza B by PCR Not Detected Not Detected    Parainfluenza 1 PCR Not Detected Not Detected    Parainfluenza 2 PCR Not Detected Not Detected    Parainfluenza 3 PCR Not Detected Not Detected    Parainfluenza 4 PCR Not Detected Not Detected    Resp Syncytial Virus PCR Not Detected Not Detected    Bordetella Parapertussis Not Detected Not Detected    B Pertussis by PCR Not Detected Not Detected    Chlamydia pneumoniae By PCR Not Detected Not Detected    Mycoplasma pneumo by PCR Not Detected Not Detected   Basic Metabolic Panel    Collection Time: 03/14/22 12:13 PM   Result Value Ref Range    Glucose 280 (H) 70 - 99 mg/dL    BUN 16 6 - 20 mg/dL    CREATININE 0.87 0.50 - 0.90 mg/dL    Calcium 6.8 (L) 8.6 - 10.4 mg/dL    Sodium 133 (L) 135 - 144 mmol/L    Potassium 3.7 3.7 - 5.3 mmol/L    Chloride 106 98 - 107 mmol/L    CO2 17 (L) 20 - 31 mmol/L    Anion Gap 10 9 - 17 mmol/L    GFR Non-African American >60 >60 mL/min    GFR African American >60 >60 mL/min    GFR Comment         Magnesium    Collection Time: 03/14/22 12:13 PM   Result Value Ref Range    Magnesium 1.9 1.6 - 2.6 mg/dL   Phosphorus    Collection Time: 03/14/22 12:13 PM   Result Value Ref Range    Phosphorus 2.0 (L) 2.6 - 4.5 mg/dL   POC Glucose Fingerstick    Collection Time: 03/14/22  2:57 PM   Result Value Ref Range    POC Glucose 186 (H) 65 - 105 mg/dL   POC Glucose Fingerstick    Collection Time: 03/14/22  3:52 PM   Result Value Ref Range    POC Glucose 196 (H) 65 - 105 mg/dL   POC Glucose Fingerstick    Collection Time: 03/14/22  5:12 PM   Result Value Ref Range    POC Glucose 190 (H) 65 - 105 mg/dL   Basic Metabolic Panel    Collection Time: 03/14/22  5:49 PM   Result Value Ref Range    Glucose 366 (H) 70 - 99 mg/dL    BUN 13 6 - 20 mg/dL    CREATININE 0.80 0.50 - 0.90 mg/dL    Calcium 6.8 (L) 8.6 - 10.4 mg/dL    Sodium 131 (L) 135 - 144 mmol/L    Potassium 3.7 3.7 - 5.3 mmol/L    Chloride 103 98 - 107 mmol/L    CO2 18 (L) 20 - 31 mmol/L    Anion Gap 10 9 - 17 mmol/L    GFR Non-African American >60 >60 mL/min    GFR African American >60 >60 mL/min    GFR Comment         Magnesium    Collection Time: 03/14/22  5:49 PM   Result Value Ref Range    Magnesium 2.3 1.6 - 2.6 mg/dL   Phosphorus    Collection Time: 03/14/22  5:49 PM   Result Value Ref Range    Phosphorus 6.2 (H) 2.6 - 4.5 mg/dL   POC Glucose Fingerstick    Collection Time: 03/14/22  6:17 PM   Result Value Ref Range    POC Glucose 178 (H) 65 - 105 mg/dL   POC Glucose Fingerstick    Collection Time: 03/14/22  7:26 PM   Result Value Ref Range    POC Glucose 201 (H) 65 - 105 mg/dL   Phosphorus    Collection Time: 03/14/22  7:50 PM   Result Value Ref Range    Phosphorus 1.9 (L) 2.6 - 4.5 mg/dL   BLOOD GAS, VENOUS    Collection Time: 03/14/22  8:22 PM   Result Value Ref Range    pH, Bryant 7.428 (H) 7.320 - 7.420    pCO2, Bryant 30.3 (L) 39.0 - 55.0    pO2, Bryant 78.4 (H) 30.0 - 50.0    HCO3, Venous 20.0 (L) 24.0 - 30.0 mmol/L    Negative Base Excess, Bryant 4.3 (H) 0.0 - 2.0 mmol/L    O2 Sat, Bryant 93.2 (H) 60.0 - 85.0 %    Carboxyhemoglobin 1.2 0 - 5 %    Methemoglobin 1.5 0.0 - 1.9 %    Pt Temp 37     Text for Respiratory RESULTS GIVEN TO RN    POC Glucose Fingerstick    Collection Time: 03/14/22  8:30 PM   Result Value Ref Range    POC Glucose 190 (H) 65 - 105 mg/dL   POC Glucose Fingerstick    Collection Time: 03/14/22  9:32 PM   Result Value Ref Range    POC Glucose 173 (H) 65 - 105 mg/dL   Basic Metabolic Panel    Collection Time: 03/15/22 12:29 AM   Result Value Ref Range    Glucose 189 (H) 70 - 99 mg/dL    BUN 11 6 - 20 mg/dL    CREATININE 0.71 0.50 - 0.90 mg/dL    Calcium 7.0 (L) 8.6 - 10.4 mg/dL Sodium 131 (L) 135 - 144 mmol/L    Potassium 3.8 3.7 - 5.3 mmol/L    Chloride 103 98 - 107 mmol/L    CO2 20 20 - 31 mmol/L    Anion Gap 8 (L) 9 - 17 mmol/L    GFR Non-African American >60 >60 mL/min    GFR African American >60 >60 mL/min    GFR Comment         Magnesium    Collection Time: 03/15/22 12:29 AM   Result Value Ref Range    Magnesium 2.0 1.6 - 2.6 mg/dL   Phosphorus    Collection Time: 03/15/22 12:29 AM   Result Value Ref Range    Phosphorus 2.1 (L) 2.6 - 4.5 mg/dL   CBC with Auto Differential    Collection Time: 03/15/22  6:09 AM   Result Value Ref Range    WBC 7.4 3.5 - 11.0 k/uL    RBC 3.76 (L) 4.0 - 5.2 m/uL    Hemoglobin 11.2 (L) 12.0 - 16.0 g/dL    Hematocrit 33.5 (L) 36 - 46 %    MCV 89.0 80 - 100 fL    MCH 29.8 26 - 34 pg    MCHC 33.5 31 - 37 g/dL    RDW 14.3 11.5 - 14.9 %    Platelets 65 (L) 145 - 450 k/uL    MPV 8.1 6.0 - 12.0 fL    Seg Neutrophils 85 (H) 36 - 66 %    Lymphocytes 9 (L) 24 - 44 %    Monocytes 5 1 - 7 %    Eosinophils % 1 0 - 4 %    Basophils 0 0 - 2 %    Segs Absolute 6.30 1.3 - 9.1 k/uL    Absolute Lymph # 0.60 (L) 1.0 - 4.8 k/uL    Absolute Mono # 0.40 0.1 - 1.3 k/uL    Absolute Eos # 0.10 0.0 - 0.4 k/uL    Basophils Absolute 0.00 0.0 - 0.2 k/uL   Hepatic Function Panel    Collection Time: 03/15/22  6:09 AM   Result Value Ref Range    Albumin 1.6 (L) 3.5 - 5.2 g/dL    Alkaline Phosphatase 313 (H) 35 - 104 U/L     (H) 5 - 33 U/L    AST 56 (H) <32 U/L    Total Bilirubin 0.69 0.3 - 1.2 mg/dL    Bilirubin, Direct 0.37 (H) <0.31 mg/dL    Bilirubin, Indirect 0.32 0.00 - 1.00 mg/dL    Total Protein 3.9 (L) 6.4 - 8.3 g/dL   Lipase    Collection Time: 03/15/22  6:09 AM   Result Value Ref Range    Lipase 66 (H) 13 - 60 U/L   Basic Metabolic Panel    Collection Time: 03/15/22  6:09 AM   Result Value Ref Range    Glucose 144 (H) 70 - 99 mg/dL    BUN 9 6 - 20 mg/dL    CREATININE 0.66 0.50 - 0.90 mg/dL    Calcium 6.9 (L) 8.6 - 10.4 mg/dL    Sodium 132 (L) 135 - 144 mmol/L    Potassium 3.7 3.7 - 5.3 mmol/L    Chloride 105 98 - 107 mmol/L    CO2 20 20 - 31 mmol/L    Anion Gap 7 (L) 9 - 17 mmol/L    GFR Non-African American >60 >60 mL/min    GFR African American >60 >60 mL/min    GFR Comment         Magnesium    Collection Time: 03/15/22  6:09 AM   Result Value Ref Range    Magnesium 1.9 1.6 - 2.6 mg/dL   Phosphorus    Collection Time: 03/15/22  6:09 AM   Result Value Ref Range    Phosphorus 2.5 (L) 2.6 - 4.5 mg/dL       Lab Results   Component Value Date/Time    SPECIAL NOT REPORTED 09/03/2019 07:52 PM     Lab Results   Component Value Date/Time    CULTURE KLEBSIELLA PNEUMONIAE >034460 CFU/ML (A) 03/13/2022 09:36 AM    CULTURE ENTEROCOCCUS FAECALIS >056987 CFU/ML (A) 03/13/2022 09:36 AM       Recent Labs     03/14/22  1817 03/14/22  1926 03/14/22 2030 03/14/22  2132   POCGLU 178* 201* 190* 173*       Radiology:    CT Head WO Contrast    Result Date: 3/12/2022  No acute intracranial abnormality. CT ABDOMEN PELVIS W IV CONTRAST Additional Contrast? Radiologist Recommendation    Result Date: 3/14/2022  1. Multifocal pneumonia, most pronounced in the left lower lobe. 2. Inflammatory changes surrounding the pancreas concerning for acute pancreatitis. No focal fluid collection. 3. Fatty liver. 4. Anasarca. 5. Presacral edema without a discrete abscess. 6. Inflammatory changes surround the bladder, concerning for underlying infection. 7. No bowel obstruction. XR CHEST PORTABLE    Result Date: 3/13/2022  Intervally placed right upper extremity PICC distal tip projects over the right atrium. No discrete pneumothorax. Left basilar opacities persist and appear mildly worsened, concerning for developing infection. Aspiration may have a similar appearance. Low lung volumes. XR CHEST PORTABLE    Result Date: 3/12/2022  Low lung volumes. Diffuse interstitial opacities in the perihilar areas with left perihilar and lower lobe alveolar type opacities consistent with multifocal airspace disease. Physical Examination:        Physical Exam  Constitutional:       General: She is not in acute distress. Appearance: Normal appearance. She is obese. HENT:      Head: Normocephalic and atraumatic. Right Ear: External ear normal.      Left Ear: External ear normal.      Nose: Nose normal.   Eyes:      Extraocular Movements: Extraocular movements intact. Cardiovascular:      Rate and Rhythm: Regular rhythm. Tachycardia present. Pulses: Normal pulses. Heart sounds: Normal heart sounds. No murmur heard. Pulmonary:      Effort: Pulmonary effort is normal. No respiratory distress. Breath sounds: Normal breath sounds. No wheezing. Abdominal:      General: Bowel sounds are normal. There is no distension. Palpations: Abdomen is soft. Tenderness: There is no abdominal tenderness. There is no guarding or rebound. Musculoskeletal:      Right lower leg: No edema. Left lower leg: No edema. Skin:     General: Skin is warm. Coloration: Skin is not jaundiced. Neurological:      General: No focal deficit present. Mental Status: She is alert and oriented to person, place, and time. Mental status is at baseline. Psychiatric:         Mood and Affect: Mood normal.         Behavior: Behavior normal.         Thought Content:  Thought content normal.           Assessment:        Primary Problem  Multifocal pneumonia    Active Hospital Problems    Diagnosis Date Noted    Acute pancreatitis [K85.90] 03/14/2022    Multifocal pneumonia [J18.9] 66/92/3841    Alcoholic hepatitis [E54.69] 03/14/2022    [facility] [N17.9] 03/13/2022    DKA, type 2, not at goal [facility]) [E11.10] 03/12/2022    Alcohol intoxication ([facility] [F10.929] 12/16/2015       Plan:        Diabetic Ketoacidosis, 2/2 poor insulin compliance  · NPO, initiate DKA protocol  · BMP q4h, initial Mag and Phos levels, glucose checks every hour  · Insulin Regular started at 0.1 Units/kg/hr, continue due to sepsis  · On clear liquid diet, discontinued IV fluids  · Hypoglycemia, phos and potassium replacement protocols  Sepsis secondary to multifocal pneumonia of left lower lobe versus UTI:   · Afebrile, , WBC within normal limits   · On 2 L oxygen via nasal cannula  · CT abdomen/pelvis: Findings of multifocal pneumonia of left lower lobe  · Respiratory panel negative and procalcitonin elevated 0.47  · Urine cultures growing Klebsiella pneumonia and Enterococcus faecalis, C. difficile negative, blood cultures no growth  · Flagyl, vancomycin, cefepime day 3  · Infectious disease consulted  Acute pancreatitis: Inflammatory changes surrounding the pancreas on CT abdomen pelvis, lipase downtrending, continue fluids  Alcohol abuse: CIWA protocol, thiamine 50 mg IV  GAIL (improving): Creatinine 1.50 on admission, now 0.66; continue fluids  Alcoholic hepatitis: Elevated LFTs, downtrending; CT abdomen/pelvis showing fatty liver  Atrial fibrillation: Lopressor 5 mg IV as needed, cardiology consulted: Echo LVEF>55%, Cardizem infusion with plans to switch to oral soon  Dilutional anemia: No gross bleeding, hemoglobin 11.2, continue trending    Comorbid conditions:  Depression: Wellbutrin, fluoxetine  Neuropathy: Lamictal and Lyrica  Hyperlipidemia: Pravastatin  Hypertension: Lisinopril  History of SVT: Diltiazem     DVT prophylaxis: Lovenox 120 mg subcutaneous daily  GI prophylaxis:  Pepcid 20 mg IV twice daily  Disposition: Likely home  Code: Full Code   Diet: ADULT DIET;  Clear Liquid; 4 carb choices (60 gm/meal)   Consults: IP CONSULT TO INTERNAL MEDICINE  IP CONSULT TO PULMONOLOGY  IP CONSULT TO SOCIAL WORK  IP CONSULT TO SOCIAL WORK  PHARMACY TO DOSE VANCOMYCIN  IP CONSULT TO CARDIOLOGY     Leena Brumfield MD  3/15/2022  7:44 AM

## 2022-03-15 NOTE — CONSULTS
[facility] -   Infectious diseases evaluation  admission date 3/12/2022    reason for consultation:   Fever    Impression :   Current:  · Multifocal community-acquired pneumonia  · UTI  · Sepsis secondary to above  · DKA  · Acute alcohol hepatitis  · Paroxysmal atrial fibrillation  · Pancreatitis  · Fatty liver    Recommendations   · D/c Flagyl and Cefepime   · IV Levaquin  · Nasal swab for MRSA was negative  · Vancomycin was started 3/13/2022 discontinued 3/15/2022  · Follow blood cultures, no growth to date  · Hepatitis panel   · Supportive care            History of Present Illness:   Initial history:  Nickie Hallman is a 42y.o.-year-old female was brought to the hospital per her family for generalized weakness, reportedly the patient was drinking alcohol all night and her  was having difficulty getting her to the bathroom in the morning so he called EMS. At the ER she was tachycardic and hypoxic was placed on oxygen per nasal cannula. Lactic acid was elevated, liver enzymes elevated, ethanol level was 404  Chest x-ray showed diffuse interstitial opacities  Urine culture on 3/13/2021 grew KLEBSIELLA PNEUMONIAE and ENTEROCOCCUS FAECALIS  3/13/2022 stool for C. difficile and GI panel negative  Respiratory panel with COVID-19 was negative  Interval changes  3/15/2022   Patient was seen at ICU, denied significant pain, does have cough productive of yellow phlegm, denied nausea or vomiting. She is tolerating diet. Temperature max was 101.8 yesterday, afebrile today.     Patient Vitals for the past 8 hrs:   BP Temp Temp src Pulse Resp SpO2   03/15/22 1230 124/65 -- -- 110 23 91 %   03/15/22 1215 125/64 -- -- 116 24 92 %   03/15/22 1200 (!) 118/56 -- -- 107 21 91 %   03/15/22 1145 (!) 128/57 -- -- 104 22 90 %   03/15/22 1130 (!) 138/57 -- -- 101 28 (!) 88 %   03/15/22 1115 (!) 120/54 -- -- 99 23 (!) 89 %   03/15/22 1100 (!) 115/56 -- -- 102 19 90 %   03/15/22 1045 (!) 122/54 -- -- 108 21 90 %   03/15/22 1030 (!) 125/57 -- -- 103 22 (!) 88 %   03/15/22 1015 (!) 134/50 -- -- 104 27 91 %   03/15/22 1000 (!) 108/52 -- -- 103 19 90 %   03/15/22 0945 (!) 111/45 -- -- 104 24 90 %   03/15/22 0930 (!) 101/46 -- -- 107 26 (!) 89 %   03/15/22 0915 (!) 106/51 -- -- 110 22 90 %   03/15/22 0900 (!) 105/54 -- -- 112 21 90 %   03/15/22 0845 (!) 108/48 -- -- 117 19 (!) 89 %   03/15/22 0745 115/62 98.3 °F (36.8 °C) Oral 104 22 90 %   03/15/22 0730 112/72 -- -- 103 25 91 %   03/15/22 0722 110/63 -- -- 107 -- --   03/15/22 0718 110/63 -- -- 108 18 91 %   03/15/22 0700 (!) 112/51 -- -- 101 19 92 %   03/15/22 0630 (!) 106/58 -- -- 101 22 91 %   03/15/22 0615 (!) 119/56 -- -- 98 29 92 %   03/15/22 0600 113/60 -- -- 98 26 92 %   03/15/22 0545 (!) 118/56 -- -- 97 22 91 %   03/15/22 0530 (!) 123/59 -- -- 100 21 91 %   03/15/22 0500 (!) 95/56 -- -- 99 20 90 %           I have personally reviewed the past medical history, past surgical history, medications, social history, and family history, and I haveupdated the database accordingly. Allergies:   Asa [aspirin], Betadine [povidone iodine], Celexa [citalopram hydrobromide], Citalopram, Lasix [furosemide], Macrobid [nitrofurantoin], Norco [hydrocodone-acetaminophen], Betadine [povidone iodine], Codeine, Lithium, Paroxetine, Paxil [paroxetine hcl], Tape [adhesive tape], and Tegretol [carbamazepine]     Review of Systems:     Review of Systems  As per history of present illness, other than above 12 system review was negative  Physical Examination :   Physical Exam  Constitutional:       General: She is not in acute distress. Appearance: Normal appearance. HENT:      Head: Normocephalic and atraumatic. Right Ear: External ear normal.      Left Ear: External ear normal.      Nose: Nose normal.   Eyes:      Extraocular Movements: Extraocular movements intact. Cardiovascular:      Rate and Rhythm: Regular rhythm. Tachycardia present. Pulses: Normal pulses. Heart sounds: Normal heart sounds. No murmur heard.       Pulmonary:      Effort: Pulmonary effort is normal. No respiratory distress. Breath sounds: Normal breath sounds. No wheezing. Abdominal:      General: Bowel sounds are normal. There is no distension. Palpations: Abdomen is soft. Tenderness: There is no abdominal tenderness. There is no guarding or rebound. Musculoskeletal:      Right lower leg: No edema. Left lower leg: No edema. Skin:     General: Skin is warm. Coloration: Skin is not jaundiced. Neurological:      General: No focal deficit present. Mental Status: She is alert and oriented to person, place, and time. Mental status is at baseline. Psychiatric:         Mood and Affect: Mood normal.         Behavior: Behavior normal.         Thought Content:  Thought content normal.        Physical Exam    Past Medical History:     Past Medical History:   Diagnosis Date    Alcohol abuse     sober [id number]    Anxiety     Arthritis     Painting esophagus     Benzodiazepine overdose 9/26/2015    Bipolar disorder, unspecified ([address]     Bipolar I disorder, most recent episode depressed, severe without psychotic features ([address]     Cellulitis 11/17/2018    Chronic abdominal wound infection 9/27/2015    Constipation 9/3/2019    Depression 7/12/2015    Drug overdose, intentional ([address] 7/12/2015    Genital herpes, unspecified     GERD (gastroesophageal reflux disease)     History of pulmonary embolism     Hx of blood clots dx 2 years ago    clots in both legs and lungs     Hypertension     Iron deficiency anemia     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    Lumbosacral spondylosis without myelopathy     MDRO (multiple drug resistant organisms) resistance 2010    MRSA (abd)    Miscarriage     multiple, around 7th mos pregnant each time d/t hypercoagulation    Morbid obesity (HCC)     MRSA (methicillin resistant staph aureus) culture positive 02/10/2017    urine    Overdose of benzodiazepine     Pernicious anemia     Primary hypercoagulable state ([facility]     antiphospholipid antibodies on Arixtra shots daily    Pulmonary embolism ([facility] 2010    Suicidal behavior 12/14/2015    Suicidal ideation     Suicide attempt ([facility] 2014    hx OD on painkillers and rubbing alcohol    SVT (supraventricular tachycardia) ([facility] 04/07/2017    Toxic effect of ethanol, intentional self-harm ([facility] 12/16/2015    Type 2 diabetes mellitus, with long-term current use of insulin ([facility]        Past Surgical  History:     Past Surgical History:   Procedure Laterality Date    ABDOMINAL HERNIA REPAIR  2014    wound vac    ABDOMINAL HERNIA REPAIR  11/3/14    BARIATRIC SURGERY  2013    [facility]    CHOLECYSTECTOMY      DILATION AND CURETTAGE OF UTERUS  2005    ENDOSCOPY, COLON, DIAGNOSTIC      EGD    FINGER AMPUTATION Left 02/09/2015    index and ring finger.  from frostbite    HERNIA REPAIR      total of 8    IVC FILTER INSERTION      LIVER RESECTION      for hepatic adenoma    LYMPH NODE BIOPSY  1990    JUSTINE AND BSO  2011    TONSILLECTOMY         Medications:      IVPB builder   IntraVENous Q8H    insulin glargine  35 Units SubCUTAneous Daily    metroNIDAZOLE  500 mg IntraVENous Q8H    enoxaparin  1 mg/kg SubCUTAneous BID    miconazole   Topical BID    lamoTRIgine  200 mg Oral Daily    pregabalin  50 mg Oral TID    buPROPion  300 mg Oral QAM    FLUoxetine  20 mg Oral Daily    pravastatin  40 mg Oral Daily    [Held by provider] lisinopril  20 mg Oral Daily    dilTIAZem  240 mg Oral Daily    sodium chloride flush  5-40 mL IntraVENous 2 times per day    famotidine (PEPCID) injection  20 mg IntraVENous BID    sodium chloride flush  5-40 mL IntraVENous 2 times per day       Social History:     Social History     Socioeconomic History    Marital status:      Spouse name: Not on file    Number of children: 1    Years of education: 3 years of college    Highest education level: Not on file   Occupational History    Occupation: infant care     Comment: last worked 2008   Tobacco Use    Smoking status: Never Smoker    Smokeless tobacco: Never Used   Substance and Sexual Activity    Alcohol use: No     Alcohol/week: 0.0 standard drinks     Comment: Last alcohol use was in 12/2015    Drug use: No     Comment: Pt stole her mom's Benzo 1 yr ago. Pt said she took more than prescribed dose of Valium once in late 90's.  Sexual activity: Yes     Partners: Male   Other Topics Concern    Not on file   Social History Narrative    Lives with Guerline Shepard ex  and daughter          Social Determinants of Health     Financial Resource Strain:     Difficulty of Paying Living Expenses: Not on file   Food Insecurity:     Worried About Running Out of Food in the Last Year: Not on file    Geronimo of Food in the Last Year: Not on file   Transportation Needs:     Lack of Transportation (Medical): Not on file    Lack of Transportation (Non-Medical):  Not on file   Physical Activity:     Days of Exercise per Week: Not on file    Minutes of Exercise per Session: Not on file   Stress:     Feeling of Stress : Not on file   Social Connections:     Frequency of Communication with Friends and Family: Not on file    Frequency of Social Gatherings with Friends and Family: Not on file    Attends Nondenominational Services: Not on file    Active Member of [address] [place] [address] or Organizations: Not on file    Attends Club or Organization Meetings: Not on file    Marital Status: Not on file   Intimate Partner Violence:     Fear of Current or Ex-Partner: Not on file    Emotionally Abused: Not on file    Physically Abused: Not on file    Sexually Abused: Not on file   Housing Stability:     Unable to Pay for Housing in the Last Year: Not on file    Number of [place] in the Last Year: Not on file    Unstable Housing in the Last Year: Not on file       Family History: Family History   Problem Relation Age of Onset    Depression Mother     High Blood Pressure Mother     High Cholesterol Mother     Diabetes Father     Heart Disease Father     Kidney Disease Father     High Blood Pressure Father     High Cholesterol Father     Cancer Maternal Uncle         of duodenum had whipple    Breast Cancer Maternal Aunt     Breast Cancer Maternal Cousin       Medical Decision Making:   I have independently reviewed/ordered the following labs:    CBC with Differential:   Recent Labs     03/14/22  0514 03/15/22  0609   WBC 11.0 7.4   HGB 13.4 11.2*   HCT 39.9 33.5*   * 65*   LYMPHOPCT 4* 9*   MONOPCT 3 5     BMP:  Recent Labs     03/15/22  0609 03/15/22  0956   * 132*   K 3.7 3.7    105   CO2 20 18*   BUN 9 8   CREATININE 0.66 0.68   MG 1.9 1.8     Hepatic Function Panel:   Recent Labs     03/13/22  0826 03/15/22  0609   PROT 4.2* 3.9*   LABALBU 2.1* 1.6*   BILIDIR 2.43* 0.37*   IBILI 0.37 0.32   BILITOT 2.80* 0.69   ALKPHOS 383* 313*   * 150*   * 56*     No results for input(s): RPR in the last 72 hours. No results for input(s): HIV in the last 72 hours. No results for input(s): BC in the last 72 hours. Lab Results   Component Value Date    CREATININE 0.68 03/15/2022    GLUCOSE 234 03/15/2022    GLUCOSE 403 06/03/2012       Detailed results: Thank you for allowing us to participate in the care of this patient. Please call with questions. This note is created with the assistance of a speech recognition program.  While intending to generate adocument that actually reflects the content of the visit, the document can still have some errors including those of syntax and sound a like substitutions which may escape proof reading. It such instances, actual meaningcan be extrapolated by contextual diversion.     Leora Shahid MD  Office: [phone]  Perfect serve / office [phone]

## 2022-03-15 NOTE — PLAN OF CARE
Problem: Falls - Risk of:  Goal: Will remain free from falls  Outcome: Ongoing  Goal: Absence of physical injury  Outcome: Ongoing     Problem: Skin Integrity:  Goal: Will show no infection signs and symptoms  Outcome: Ongoing  Goal: Absence of new skin breakdown  Outcome: Ongoing     Problem: Serum Glucose Level - Abnormal:  Goal: Ability to maintain appropriate glucose levels will improve  Outcome: Ongoing

## 2022-03-15 NOTE — PLAN OF CARE
Problem: Falls - Risk of:  Goal: Will remain free from falls  Description: Will remain free from falls  Outcome: Ongoing  Pt remains free from falls this shift, fall precautions this shift. Goal: Absence of physical injury  Description: Absence of physical injury  Outcome: Ongoing     Problem: Skin Integrity:  Goal: Will show no infection signs and symptoms  Description: Will show no infection signs and symptoms  Outcome: Ongoing  Goal: Absence of new skin breakdown  Description: Absence of new skin breakdown  Outcome: Ongoing   Pt remains free from new signs of skin breakdown. Pt turned q2 hours and checked for incontinence q2 hours throughout the shift. Pt educated on skin care and pressure ulcer prevention.      Problem: Serum Glucose Level - Abnormal:  Goal: Ability to maintain appropriate glucose levels will improve  Description: Ability to maintain appropriate glucose levels will improve  Outcome: Ongoing

## 2022-03-15 NOTE — PROGRESS NOTES
Pulmonary Progress Note  [facility]      Patient - Paola Khan,  Age - 42 y.o.    - 1980      Room Number -    MRN -  [id number]   Acct # - [de-identified]  Date of Admission -  3/12/2022  7:42 AM    Consulting Rajendra Mata MD  Primary Care Physician - [facility]   Patient appears to be resting quietly. O2 saturations low 90s on room air. OBJECTIVE   VITALS    height is 5' 8\" (1.727 m) and weight is 280 lb (127 kg). Her oral temperature is 98.3 °F (36.8 °C). Her blood pressure is 115/56 (abnormal) and her pulse is 102. Her respiration is 19 and oxygen saturation is 90%. Body mass index is 42.57 kg/m². Temperature Range: Temp: 98.3 °F (36.8 °C) Temp  Av.9 °F (37.2 °C)  Min: 97.8 °F (36.6 °C)  Max: 100.6 °F (38.1 °C)  BP Range:  Systolic (91TSR), UGQ:394 , Min:85 , KXB:856     Diastolic (17AYF), YAF:96, Min:30, Max:72    Pulse Range: Pulse  Av.5  Min: 97  Max: 119  Respiration Range: Resp  Av.9  Min: 18  Max: 34  Current Pulse Ox[de-identified]  SpO2: 90 %  24HR Pulse Ox Range:  SpO2  Av.7 %  Min: 88 %  Max: 99 %  Oxygen Amount and Delivery: O2 Flow Rate (L/min): 2 L/min    Wt Readings from Last 3 Encounters:   22 280 lb (127 kg)   22 280 lb (127 kg)   21 280 lb (127 kg)       I/O (24 Hours)    Intake/Output Summary (Last 24 hours) at 3/15/2022 1126  Last data filed at 3/15/2022 0909  Gross per 24 hour   Intake 7892.66 ml   Output 8350 ml   Net -457.34 ml       EXAM     General Appearance  Awake, alert, oriented, in no acute distress  HEENT - normocephalic, atraumatic. Neck - Supple,  trachea midline   Lungs -coarse breath sounds no crackles rales or wheeze  Heart Exam:PMI normal. No lifts, heaves, or thrills. RRR. No murmurs, clicks, gallops, or rubs  Abdomen Exam: Abdomen soft, non-tender.   Extremity Exam: No signs of cyanosis    MEDS      IVPB builder   IntraVENous Q8H    insulin glargine  35 Units SubCUTAneous Daily    vancomycin  1,500 mg IntraVENous Q18H    metroNIDAZOLE  500 mg IntraVENous Q8H    enoxaparin  1 mg/kg SubCUTAneous BID    miconazole   Topical BID    lamoTRIgine  200 mg Oral Daily    pregabalin  50 mg Oral TID    buPROPion  300 mg Oral QAM    FLUoxetine  20 mg Oral Daily    pravastatin  40 mg Oral Daily    [Held by provider] lisinopril  20 mg Oral Daily    dilTIAZem  240 mg Oral Daily    vancomycin (VANCOCIN) intermittent dosing (placeholder)   Other RX Placeholder    sodium chloride flush  5-40 mL IntraVENous 2 times per day    famotidine (PEPCID) injection  20 mg IntraVENous BID    sodium chloride flush  5-40 mL IntraVENous 2 times per day      dilTIAZem 7.5 mg/hr (03/15/22 0909)    dextrose      insulin 4.96 Units/hr (03/15/22 1019)    sodium chloride       HYDROcodone 5 mg - acetaminophen, perflutren lipid microspheres, metoprolol, sodium chloride flush, glucose, dextrose, glucagon (rDNA), dextrose, potassium chloride, magnesium sulfate, sodium phosphate IVPB **OR** sodium phosphate IVPB **OR** sodium phosphate IVPB, polyethylene glycol, sodium chloride flush, sodium chloride, ondansetron **OR** ondansetron, acetaminophen **OR** acetaminophen, LORazepam **OR** LORazepam **OR** LORazepam **OR** LORazepam **OR** LORazepam **OR** LORazepam **OR** LORazepam **OR** LORazepam    LABS   CBC   Recent Labs     03/15/22  0609   WBC 7.4   HGB 11.2*   HCT 33.5*   MCV 89.0   PLT 65*     BMP:   Lab Results   Component Value Date     03/15/2022    K 3.7 03/15/2022     03/15/2022    CO2 18 03/15/2022    BUN 8 03/15/2022    LABALBU 1.6 03/15/2022    LABALBU 3.9 06/03/2012    CREATININE 0.68 03/15/2022    CALCIUM 7.1 03/15/2022    GFRAA >60 03/15/2022    LABGLOM >60 03/15/2022     ABGs:  Lab Results   Component Value Date    PHART 7.437 01/20/2016    PO2ART 90.3 01/20/2016    FIJ5CFK 40.7 01/20/2016      Lab Results   Component Value Date    MODE NOT REPORTED 09/25/2017     Ionized Calcium:  No results found for: IONCA  Magnesium:    Lab Results   Component Value Date    MG 1.8 03/15/2022     Phosphorus:    Lab Results   Component Value Date    PHOS 2.2 03/15/2022        LIVER PROFILE   Recent Labs     03/15/22  0609   AST 56*   *   LIPASE 66*   BILIDIR 0.37*   BILITOT 0.69   ALKPHOS 313*     INR   Recent Labs     03/13/22  1851   INR 1.2     PTT   Lab Results   Component Value Date    APTT 35.1 03/12/2022         RADIOLOGY     (See actual reports for details)    ASSESSMENT/PLAN     Patient Active Problem List   Diagnosis    Pernicious anemia    History of ulcer disease    History of DVT of lower extremity    Primary hypercoagulable state ([address]    Amputation finger    GERD (gastroesophageal reflux disease)    Alcohol dependence in remission ([facility]    Type 2 diabetes mellitus with diabetic polyneuropathy, with long-term current use of insulin ([address]    Primary insomnia    Essential hypertension    Bipolar affective disorder in remission ([address]    History of pulmonary embolism    Antiphospholipid syndrome ([address]    Alcohol intoxication ([address]    Hypertriglyceridemia    Chronic post-traumatic stress disorder (PTSD)    OCD (obsessive compulsive disorder)    Severe benzodiazepine use disorder ([address]    Morbid obesity with BMI of 50.0-59.9, adult ([address]    History of MRSA infection    Pain of upper abdomen    Painting esophagus    NAFLD (nonalcoholic fatty liver disease)    Nondependent opioid abuse in remission ([address]    Osteopenia    History of Awa-en-Y gastric bypass    Herpes genitalis    History of drug abuse ([address]    Malabsorption    History of pulmonary embolus (PE)    Vitamin B 12 deficiency    Vitamin D deficiency    History of surgery of liver    Blood alkaline phosphatase increased compared with prior measurement    Paroxysmal SVT (supraventricular tachycardia) (HCC)    Anxiety    Hypomagnesemia    Chronic idiopathic constipation    Recurrent incisional hernia    History of small bowel obstruction    History of peptic ulcer    Fibromyalgia    Polyneuropathy    COVID-19 virus infection    DKA, type 2, not at goal [facility])    GAIL (acute kidney injury) ([facility]    Acute pancreatitis    Multifocal pneumonia    Alcoholic hepatitis     Acute alcohol intoxication  Severe diabetic keto/metabolic acidosis (anion gap, lactic acid)  History of hypercoagulable state, antiphospholipid antibody  Morbid obesity, status post bariatric surgery 2013  SAMIR  Urinary tract infection.     Currently, the patient has a non-anion gap metabolic acidosis with a bicarb of 18. Attempting to see if we can wean the patient off the insulin drip at this time. I was able to round with the bedside nurse. The patient still needs a CIWA protocol. Currently on broad-spectrum antibiotics ordered by primary service. We will recheck chest x-ray in a.m.      Hopefully we can have the patient undergo a PA and  left lateral      Electronically signed by Baldemar Strange MD on 3/15/2022 at 11:26 AM

## 2022-03-15 NOTE — PROGRESS NOTES
Vancomycin Dosing by Pharmacy - Daily Note   Vancomycin Therapy Day:  3  Indication: Sepsis    Allergies:  Asa [aspirin], Betadine [povidone iodine], Celexa [citalopram hydrobromide], Citalopram, Lasix [furosemide], Macrobid [nitrofurantoin], Norco [hydrocodone-acetaminophen], Betadine [povidone iodine], Codeine, Lithium, Paroxetine, Paxil [paroxetine hcl], Tape [adhesive tape], and Tegretol [carbamazepine]   Actual Weight:    Wt Readings from Last 1 Encounters:   03/12/22 280 lb (127 kg)       Labs/Ancillary Data  Estimated Creatinine Clearance: 152 mL/min (based on SCr of 0.68 mg/dL). Recent Labs     03/14/22  0514 03/14/22  0808 03/15/22  0029 03/15/22  0609 03/15/22  0956   CREATININE  --    < > 0.71 0.66 0.68   BUN  --    < > 11 9 8   WBC 11.0  --   --  7.4  --     < > = values in this interval not displayed. Procalcitonin   Date Value Ref Range Status   03/14/2022 0.47 (H) <0.09 ng/mL Final     Comment:           Suspected Sepsis:  <0.50 ng/mL     Low likelihood of sepsis. 0.50-2.00 ng/mL     Increased likelihood of sepsis. Antibiotics encouraged. >2.00 ng/mL     High risk of sepsis/shock. Antibiotics strongly encouraged. Suspected Lower Resp Tract Infections:  <0.24 ng/mL     Low likelihood of bacterial infection. >0.24 ng/mL     Increased likelihood of bacterial infection. Antibiotics encouraged. With successful antibiotic therapy, PCT levels should decrease rapidly. (Half-life of 24 to   36 hours.)        Procalcitonin values from samples collected within the first 6 hours of systemic infection   may still be low. Retesting may be indicated. Values from day 1 and day 4 can be entered into the Change in Procalcitonin Calculator   (www.[employer]-pct-[email or web address] com) to determine the patient's Mortality Risk Prognosis        In healthy neonates, plasma Procalcitonin (PCT) concentrations increase gradually after   birth, reaching peak values at about 24 hours of age then decrease to normal values below   0.5 ng/mL by 48-72 hours of age. Intake/Output Summary (Last 24 hours) at 3/15/2022 1120  Last data filed at 3/15/2022 0909  Gross per 24 hour   Intake 7892.66 ml   Output 8350 ml   Net -457.34 ml     Temp: 98.3F    Culture Date / Source  /  Results  MRSA swab - Negative  Recent vancomycin administrations                     vancomycin (VANCOCIN) 1,500 mg in dextrose 5 % 250 mL IVPB (ADDAVIAL) (mg) 1,500 mg New Bag 03/15/22 0533     1,500 mg New Bag 03/14/22 1458    vancomycin (VANCOCIN) 2,500 mg in dextrose 5 % 500 mL IVPB (mg) 2,500 mg New Bag 03/13/22 2000                    Vancomycin Concentrations:   TROUGH:  No results for input(s): VANCOTROUGH in the last 72 hours. RANDOM:    Recent Labs     03/15/22  0956   VANCORANDOM 22.7       . PLAN     Srcr stable random level shows AUC to be under 400, will increase does to 1500mg Iv every 12 hrs. Monitor srcr. Vancomycin Target Concentration Parameters  Treatment  Population Target AUC/JAVED Target Trough   Invasive MRSA Infection (bacteremia, pneumonia, meningitis, endocarditis, osteomyelitis)  Sepsis (undifferentiated) 400-600 N/A   Infection due to non-MRSA pathogen  Empiric treatment of non-invasive MRSA infection  (SSTI, UTI) <500 10-15 mg/L   CrCl < 29 mL/min  Rapidly fluctuating serum creatinine   GAIL N/A < 15 mg/L     Renal replacement therapy is dosed by levels, per hospital protocol. Abbreviations  * Pauc: probability that AUC is >400 (efficacy); Pconc: probability that Ctrough is above 20 ?g/mL (toxicity); Tox: Probability of nephrotoxicity, based on Lodhenrry et al. Clin Infect Dis 2009. Thank you for the consult. Pharmacy will continue to follow. Brendon Tineo. [facility]

## 2022-03-15 NOTE — FLOWSHEET NOTE
Patient shared how she was feeling emotionally and spiritually and welcomed prayer. 03/15/22 1510   Encounter Summary   Services provided to: Patient   Referral/Consult From: [facility]   (3-15-22)   Complexity of Encounter Moderate   Length of Encounter 15 minutes   Spiritual/Shinto   Type Spiritual support   Assessment Anxious   Intervention Active listening;Explored feelings, thoughts, concerns;Explored coping resources;Prayer;Sustaining presence/ Ministry of presence; Facilitated forgiveness; Discussed meaning/purpose   Outcome Expressed gratitude;Engaged in conversation;Expressed feelings/needs/concerns;Expressed regrets; Receptive

## 2022-03-15 NOTE — CARE COORDINATION
ONGOING DISCHARGE PLAN:    Patient is alert and oriented x4. Spoke with patient regarding discharge plan and patient confirms that plan is still home without needs. DME:glucometer with supplies. On IV Cefepime and Flagyl. ID consulted. Daily drinker, denies need for info on drug and alcohol rehab. Pt has CIWAW scale. Order for CXR in AM. Currently on 02 @ 2L NC SP02 91%, does not wear at home - will follow for home 02. Pulm following. ORANGE HEADER 19%. Will continue to follow for additional discharge needs.     Electronically signed by Chris Tamez RN on 3/15/2022 at 12:53 PM

## 2022-03-16 ENCOUNTER — APPOINTMENT (OUTPATIENT)
Dept: GENERAL RADIOLOGY | Age: 42
DRG: 871 | End: 2022-03-16
Payer: MEDICARE

## 2022-03-16 ENCOUNTER — APPOINTMENT (OUTPATIENT)
Dept: CT IMAGING | Age: 42
DRG: 871 | End: 2022-03-16
Payer: MEDICARE

## 2022-03-16 LAB
ABSOLUTE EOS #: 0.05 K/UL (ref 0–0.4)
ABSOLUTE LYMPH #: 0.41 K/UL (ref 1–4.8)
ABSOLUTE MONO #: 0.82 K/UL (ref 0.1–1.3)
ABSOLUTE RETIC #: 0.01 M/UL (ref 0.02–0.1)
ANION GAP SERPL CALCULATED.3IONS-SCNC: 10 MMOL/L (ref 9–17)
BASOPHILS # BLD: 0 % (ref 0–2)
BASOPHILS ABSOLUTE: 0 K/UL (ref 0–0.2)
BUN BLDV-MCNC: 7 MG/DL (ref 6–20)
CALCIUM SERPL-MCNC: 7.1 MG/DL (ref 8.6–10.4)
CHLORIDE BLD-SCNC: 101 MMOL/L (ref 98–107)
CO2: 21 MMOL/L (ref 20–31)
CREAT SERPL-MCNC: 0.68 MG/DL (ref 0.5–0.9)
D-DIMER QUANTITATIVE: 1.32 MG/L FEU (ref 0–0.59)
EKG ATRIAL RATE: 112 BPM
EKG P AXIS: 65 DEGREES
EKG P-R INTERVAL: 122 MS
EKG Q-T INTERVAL: 330 MS
EKG QRS DURATION: 88 MS
EKG QTC CALCULATION (BAZETT): 450 MS
EKG R AXIS: 71 DEGREES
EKG T AXIS: 65 DEGREES
EKG VENTRICULAR RATE: 112 BPM
EOSINOPHILS RELATIVE PERCENT: 1 % (ref 0–4)
FERRITIN: 781 UG/L (ref 13–150)
FIBRINOGEN: 623 MG/DL (ref 210–530)
FOLATE: >20 NG/ML
GFR AFRICAN AMERICAN: >60 ML/MIN
GFR NON-AFRICAN AMERICAN: >60 ML/MIN
GFR SERPL CREATININE-BSD FRML MDRD: ABNORMAL ML/MIN/{1.73_M2}
GLUCOSE BLD-MCNC: 105 MG/DL (ref 65–105)
GLUCOSE BLD-MCNC: 147 MG/DL (ref 65–105)
GLUCOSE BLD-MCNC: 239 MG/DL (ref 70–99)
GLUCOSE BLD-MCNC: 259 MG/DL (ref 65–105)
GLUCOSE BLD-MCNC: 277 MG/DL (ref 65–105)
GLUCOSE BLD-MCNC: 566 MG/DL (ref 65–105)
HAPTOGLOBIN: 327 MG/DL (ref 30–200)
HCT VFR BLD CALC: 33.4 % (ref 36–46)
HEMOGLOBIN: 11.1 G/DL (ref 12–16)
IRON SATURATION: 23 % (ref 20–55)
IRON: 19 UG/DL (ref 37–145)
LACTATE DEHYDROGENASE: 276 U/L (ref 135–214)
LYMPHOCYTES # BLD: 8 % (ref 24–44)
MAGNESIUM: 1.8 MG/DL (ref 1.6–2.6)
MCH RBC QN AUTO: 30.1 PG (ref 26–34)
MCHC RBC AUTO-ENTMCNC: 33.2 G/DL (ref 31–37)
MCV RBC AUTO: 90.6 FL (ref 80–100)
MONOCYTES # BLD: 16 % (ref 1–7)
MORPHOLOGY: ABNORMAL
MORPHOLOGY: ABNORMAL
MYCOPLASMA PNEUMONIAE IGM: 0.36
PDW BLD-RTO: 14.4 % (ref 11.5–14.9)
PHOSPHORUS: 2.4 MG/DL (ref 2.6–4.5)
PLATELET # BLD: 34 K/UL (ref 150–450)
PLATELET # BLD: 37 K/UL (ref 150–450)
PLATELET # BLD: 52 K/UL (ref 150–450)
PMV BLD AUTO: 11.4 FL (ref 6–12)
POTASSIUM SERPL-SCNC: 3.9 MMOL/L (ref 3.7–5.3)
RBC # BLD: 3.68 M/UL (ref 4–5.2)
RETIC %: 0.3 % (ref 0.5–2)
SEG NEUTROPHILS: 75 % (ref 36–66)
SEGMENTED NEUTROPHILS ABSOLUTE COUNT: 3.82 K/UL (ref 1.3–9.1)
SODIUM BLD-SCNC: 132 MMOL/L (ref 135–144)
TOTAL IRON BINDING CAPACITY: 82 UG/DL (ref 250–450)
UNSATURATED IRON BINDING CAPACITY: 63 UG/DL (ref 112–347)
VITAMIN B-12: >2000 PG/ML (ref 232–1245)
WBC # BLD: 5.1 K/UL (ref 3.5–11)

## 2022-03-16 PROCEDURE — 6360000002 HC RX W HCPCS: Performed by: INTERNAL MEDICINE

## 2022-03-16 PROCEDURE — 6370000000 HC RX 637 (ALT 250 FOR IP): Performed by: INTERNAL MEDICINE

## 2022-03-16 PROCEDURE — 84100 ASSAY OF PHOSPHORUS: CPT

## 2022-03-16 PROCEDURE — 2580000003 HC RX 258: Performed by: INTERNAL MEDICINE

## 2022-03-16 PROCEDURE — 71260 CT THORAX DX C+: CPT

## 2022-03-16 PROCEDURE — 85049 AUTOMATED PLATELET COUNT: CPT

## 2022-03-16 PROCEDURE — 80048 BASIC METABOLIC PNL TOTAL CA: CPT

## 2022-03-16 PROCEDURE — 83540 ASSAY OF IRON: CPT

## 2022-03-16 PROCEDURE — 86022 PLATELET ANTIBODIES: CPT

## 2022-03-16 PROCEDURE — 71046 X-RAY EXAM CHEST 2 VIEWS: CPT

## 2022-03-16 PROCEDURE — 82607 VITAMIN B-12: CPT

## 2022-03-16 PROCEDURE — 83010 ASSAY OF HAPTOGLOBIN QUANT: CPT

## 2022-03-16 PROCEDURE — 6370000000 HC RX 637 (ALT 250 FOR IP)

## 2022-03-16 PROCEDURE — 6370000000 HC RX 637 (ALT 250 FOR IP): Performed by: NURSE PRACTITIONER

## 2022-03-16 PROCEDURE — 2000000000 HC ICU R&B

## 2022-03-16 PROCEDURE — 97162 PT EVAL MOD COMPLEX 30 MIN: CPT

## 2022-03-16 PROCEDURE — 85379 FIBRIN DEGRADATION QUANT: CPT

## 2022-03-16 PROCEDURE — 82728 ASSAY OF FERRITIN: CPT

## 2022-03-16 PROCEDURE — 83550 IRON BINDING TEST: CPT

## 2022-03-16 PROCEDURE — 85045 AUTOMATED RETICULOCYTE COUNT: CPT

## 2022-03-16 PROCEDURE — 6370000000 HC RX 637 (ALT 250 FOR IP): Performed by: STUDENT IN AN ORGANIZED HEALTH CARE EDUCATION/TRAINING PROGRAM

## 2022-03-16 PROCEDURE — 85384 FIBRINOGEN ACTIVITY: CPT

## 2022-03-16 PROCEDURE — 36415 COLL VENOUS BLD VENIPUNCTURE: CPT

## 2022-03-16 PROCEDURE — 6360000004 HC RX CONTRAST MEDICATION: Performed by: INTERNAL MEDICINE

## 2022-03-16 PROCEDURE — 2580000003 HC RX 258: Performed by: STUDENT IN AN ORGANIZED HEALTH CARE EDUCATION/TRAINING PROGRAM

## 2022-03-16 PROCEDURE — 85025 COMPLETE CBC W/AUTO DIFF WBC: CPT

## 2022-03-16 PROCEDURE — 93010 ELECTROCARDIOGRAM REPORT: CPT | Performed by: INTERNAL MEDICINE

## 2022-03-16 PROCEDURE — 99233 SBSQ HOSP IP/OBS HIGH 50: CPT | Performed by: INTERNAL MEDICINE

## 2022-03-16 PROCEDURE — 83615 LACTATE (LD) (LDH) ENZYME: CPT

## 2022-03-16 PROCEDURE — 97530 THERAPEUTIC ACTIVITIES: CPT

## 2022-03-16 PROCEDURE — 82746 ASSAY OF FOLIC ACID SERUM: CPT

## 2022-03-16 PROCEDURE — 99222 1ST HOSP IP/OBS MODERATE 55: CPT | Performed by: INTERNAL MEDICINE

## 2022-03-16 PROCEDURE — 83735 ASSAY OF MAGNESIUM: CPT

## 2022-03-16 PROCEDURE — 2500000003 HC RX 250 WO HCPCS: Performed by: STUDENT IN AN ORGANIZED HEALTH CARE EDUCATION/TRAINING PROGRAM

## 2022-03-16 PROCEDURE — 2500000003 HC RX 250 WO HCPCS: Performed by: INTERNAL MEDICINE

## 2022-03-16 RX ORDER — PRAVASTATIN SODIUM 40 MG
40 TABLET ORAL NIGHTLY
Status: DISCONTINUED | OUTPATIENT
Start: 2022-03-16 | End: 2022-03-19 | Stop reason: HOSPADM

## 2022-03-16 RX ORDER — 0.9 % SODIUM CHLORIDE 0.9 %
80 INTRAVENOUS SOLUTION INTRAVENOUS ONCE
Status: COMPLETED | OUTPATIENT
Start: 2022-03-16 | End: 2022-03-16

## 2022-03-16 RX ORDER — FONDAPARINUX SODIUM 10 MG/.8ML
10 INJECTION SUBCUTANEOUS DAILY
Status: DISCONTINUED | OUTPATIENT
Start: 2022-03-16 | End: 2022-03-19 | Stop reason: HOSPADM

## 2022-03-16 RX ORDER — CHLORDIAZEPOXIDE HYDROCHLORIDE 25 MG/1
25 CAPSULE, GELATIN COATED ORAL 3 TIMES DAILY
Status: DISCONTINUED | OUTPATIENT
Start: 2022-03-16 | End: 2022-03-17

## 2022-03-16 RX ORDER — SODIUM CHLORIDE 0.9 % (FLUSH) 0.9 %
10 SYRINGE (ML) INJECTION PRN
Status: DISCONTINUED | OUTPATIENT
Start: 2022-03-16 | End: 2022-03-19 | Stop reason: HOSPADM

## 2022-03-16 RX ORDER — INSULIN GLARGINE 100 [IU]/ML
50 INJECTION, SOLUTION SUBCUTANEOUS DAILY
Status: DISCONTINUED | OUTPATIENT
Start: 2022-03-16 | End: 2022-03-19 | Stop reason: HOSPADM

## 2022-03-16 RX ADMIN — LORAZEPAM 2 MG: 2 INJECTION INTRAMUSCULAR; INTRAVENOUS at 08:29

## 2022-03-16 RX ADMIN — SODIUM CHLORIDE, PRESERVATIVE FREE 10 ML: 5 INJECTION INTRAVENOUS at 08:32

## 2022-03-16 RX ADMIN — CHLORDIAZEPOXIDE HYDROCHLORIDE 25 MG: 25 CAPSULE ORAL at 14:29

## 2022-03-16 RX ADMIN — FAMOTIDINE 20 MG: 10 INJECTION, SOLUTION INTRAVENOUS at 08:28

## 2022-03-16 RX ADMIN — DILTIAZEM HYDROCHLORIDE 30 MG: 30 TABLET, FILM COATED ORAL at 20:58

## 2022-03-16 RX ADMIN — HYDROCODONE BITARTRATE AND ACETAMINOPHEN 1 TABLET: 5; 325 TABLET ORAL at 02:37

## 2022-03-16 RX ADMIN — FLUOXETINE HYDROCHLORIDE 20 MG: 20 CAPSULE ORAL at 08:30

## 2022-03-16 RX ADMIN — HYDROCODONE BITARTRATE AND ACETAMINOPHEN 1 TABLET: 5; 325 TABLET ORAL at 20:58

## 2022-03-16 RX ADMIN — IOPAMIDOL 75 ML: 755 INJECTION, SOLUTION INTRAVENOUS at 17:07

## 2022-03-16 RX ADMIN — SODIUM CHLORIDE, PRESERVATIVE FREE 10 ML: 5 INJECTION INTRAVENOUS at 08:30

## 2022-03-16 RX ADMIN — LORAZEPAM 2 MG: 2 INJECTION INTRAMUSCULAR; INTRAVENOUS at 02:03

## 2022-03-16 RX ADMIN — DILTIAZEM HYDROCHLORIDE 30 MG: 30 TABLET, FILM COATED ORAL at 14:28

## 2022-03-16 RX ADMIN — CHLORDIAZEPOXIDE HYDROCHLORIDE 25 MG: 25 CAPSULE ORAL at 10:28

## 2022-03-16 RX ADMIN — BUPROPION HYDROCHLORIDE 300 MG: 300 TABLET, FILM COATED, EXTENDED RELEASE ORAL at 08:29

## 2022-03-16 RX ADMIN — DILTIAZEM HYDROCHLORIDE 30 MG: 30 TABLET, FILM COATED ORAL at 00:01

## 2022-03-16 RX ADMIN — DILTIAZEM HYDROCHLORIDE 30 MG: 30 TABLET, FILM COATED ORAL at 05:38

## 2022-03-16 RX ADMIN — HYDROCODONE BITARTRATE AND ACETAMINOPHEN 1 TABLET: 5; 325 TABLET ORAL at 14:28

## 2022-03-16 RX ADMIN — LEVOFLOXACIN 750 MG: 5 INJECTION, SOLUTION INTRAVENOUS at 17:47

## 2022-03-16 RX ADMIN — ACETAMINOPHEN 650 MG: 325 TABLET, FILM COATED ORAL at 14:35

## 2022-03-16 RX ADMIN — LORAZEPAM 2 MG: 2 INJECTION INTRAMUSCULAR; INTRAVENOUS at 05:38

## 2022-03-16 RX ADMIN — LORAZEPAM 2 MG: 2 INJECTION INTRAMUSCULAR; INTRAVENOUS at 00:01

## 2022-03-16 RX ADMIN — INSULIN LISPRO 9 UNITS: 100 INJECTION, SOLUTION INTRAVENOUS; SUBCUTANEOUS at 08:33

## 2022-03-16 RX ADMIN — LAMOTRIGINE 200 MG: 100 TABLET ORAL at 08:31

## 2022-03-16 RX ADMIN — HYDROCODONE BITARTRATE AND ACETAMINOPHEN 1 TABLET: 5; 325 TABLET ORAL at 08:29

## 2022-03-16 RX ADMIN — INSULIN LISPRO 3 UNITS: 100 INJECTION, SOLUTION INTRAVENOUS; SUBCUTANEOUS at 17:47

## 2022-03-16 RX ADMIN — SODIUM CHLORIDE 80 ML: 9 INJECTION, SOLUTION INTRAVENOUS at 17:09

## 2022-03-16 RX ADMIN — ANTI-FUNGAL POWDER MICONAZOLE NITRATE TALC FREE: 1.42 POWDER TOPICAL at 21:06

## 2022-03-16 RX ADMIN — FONDAPARINUX SODIUM 10 MG: 10 INJECTION, SOLUTION SUBCUTANEOUS at 18:39

## 2022-03-16 RX ADMIN — SODIUM PHOSPHATE, MONOBASIC, MONOHYDRATE AND SODIUM PHOSPHATE, DIBASIC, ANHYDROUS 10 MMOL: 276; 142 INJECTION, SOLUTION INTRAVENOUS at 08:51

## 2022-03-16 RX ADMIN — PREGABALIN 50 MG: 25 CAPSULE ORAL at 08:29

## 2022-03-16 RX ADMIN — LORAZEPAM 2 MG: 2 INJECTION INTRAMUSCULAR; INTRAVENOUS at 03:58

## 2022-03-16 RX ADMIN — SODIUM CHLORIDE, PRESERVATIVE FREE 10 ML: 5 INJECTION INTRAVENOUS at 21:00

## 2022-03-16 RX ADMIN — ANTI-FUNGAL POWDER MICONAZOLE NITRATE TALC FREE: 1.42 POWDER TOPICAL at 08:31

## 2022-03-16 RX ADMIN — CHLORDIAZEPOXIDE HYDROCHLORIDE 25 MG: 25 CAPSULE ORAL at 20:58

## 2022-03-16 RX ADMIN — PRAVASTATIN SODIUM 40 MG: 40 TABLET ORAL at 20:58

## 2022-03-16 RX ADMIN — INSULIN LISPRO 9 UNITS: 100 INJECTION, SOLUTION INTRAVENOUS; SUBCUTANEOUS at 12:27

## 2022-03-16 RX ADMIN — INSULIN GLARGINE 50 UNITS: 100 INJECTION, SOLUTION SUBCUTANEOUS at 08:33

## 2022-03-16 ASSESSMENT — PAIN SCALES - GENERAL
PAINLEVEL_OUTOF10: 7
PAINLEVEL_OUTOF10: 5
PAINLEVEL_OUTOF10: 7
PAINLEVEL_OUTOF10: 3
PAINLEVEL_OUTOF10: 6
PAINLEVEL_OUTOF10: 3

## 2022-03-16 ASSESSMENT — PAIN DESCRIPTION - PAIN TYPE: TYPE: CHRONIC PAIN

## 2022-03-16 ASSESSMENT — PAIN DESCRIPTION - ONSET: ONSET: ON-GOING

## 2022-03-16 ASSESSMENT — ENCOUNTER SYMPTOMS
VOMITING: 0
SORE THROAT: 0
CHEST TIGHTNESS: 0
EYE REDNESS: 0
ABDOMINAL PAIN: 0
COLOR CHANGE: 0
BACK PAIN: 0
WHEEZING: 0
NAUSEA: 0
SHORTNESS OF BREATH: 0

## 2022-03-16 ASSESSMENT — PAIN DESCRIPTION - ORIENTATION
ORIENTATION: POSTERIOR
ORIENTATION: RIGHT

## 2022-03-16 ASSESSMENT — PAIN DESCRIPTION - LOCATION
LOCATION: GENERALIZED
LOCATION: BACK

## 2022-03-16 ASSESSMENT — PAIN DESCRIPTION - DESCRIPTORS: DESCRIPTORS: BURNING

## 2022-03-16 ASSESSMENT — PAIN - FUNCTIONAL ASSESSMENT: PAIN_FUNCTIONAL_ASSESSMENT: ACTIVITIES ARE NOT PREVENTED

## 2022-03-16 ASSESSMENT — PAIN DESCRIPTION - FREQUENCY: FREQUENCY: CONTINUOUS

## 2022-03-16 ASSESSMENT — PAIN DESCRIPTION - PROGRESSION: CLINICAL_PROGRESSION: NOT CHANGED

## 2022-03-16 NOTE — PLAN OF CARE
Problem: Falls - Risk of:  Goal: Will remain free from falls  3/16/2022 0338 by Homero Kelly RN  Outcome: Ongoing  3/15/2022 1723 by Jose Lane RN  Outcome: Ongoing  Goal: Absence of physical injury  3/16/2022 0338 by Homero Kelly RN  Outcome: Ongoing  3/15/2022 1723 by Jose Lane RN  Outcome: Ongoing     Problem: Skin Integrity:  Goal: Will show no infection signs and symptoms  3/16/2022 0338 by Homero Kelly RN  Outcome: Ongoing  3/15/2022 1723 by Jose Lane RN  Outcome: Ongoing  Goal: Absence of new skin breakdown  3/16/2022 0338 by Homero Kelly RN  Outcome: Ongoing  3/15/2022 1723 by Jose Lane RN  Outcome: Ongoing     Problem: Serum Glucose Level - Abnormal:  Goal: Ability to maintain appropriate glucose levels will improve  3/16/2022 0338 by Homero Kelly RN  Outcome: Ongoing  3/15/2022 1723 by Jose Lane RN  Outcome: Ongoing

## 2022-03-16 NOTE — PROGRESS NOTES
[facility] CARDIOLOGY Progress Note    3/16/2022 6:59 AM      Subjective:  Ms. Alex Menendezring good night sleep and  denies any chest pain or shortness of breath or palpitations or lightheadedness or dizziness. Review of systems:  No fever or chills. No headaches.              LABS:     Recent Results (from the past 24 hour(s))   POC Glucose Fingerstick    Collection Time: 03/15/22  7:19 AM   Result Value Ref Range    POC Glucose 145 (H) 65 - 105 mg/dL   POC Glucose Fingerstick    Collection Time: 03/15/22  8:42 AM   Result Value Ref Range    POC Glucose 222 (H) 65 - 105 mg/dL   POC Glucose Fingerstick    Collection Time: 03/15/22  9:15 AM   Result Value Ref Range    POC Glucose 233 (H) 65 - 105 mg/dL   Basic Metabolic Panel    Collection Time: 03/15/22  9:56 AM   Result Value Ref Range    Glucose 234 (H) 70 - 99 mg/dL    BUN 8 6 - 20 mg/dL    CREATININE 0.68 0.50 - 0.90 mg/dL    Calcium 7.1 (L) 8.6 - 10.4 mg/dL    Sodium 132 (L) 135 - 144 mmol/L    Potassium 3.7 3.7 - 5.3 mmol/L    Chloride 105 98 - 107 mmol/L    CO2 18 (L) 20 - 31 mmol/L    Anion Gap 9 9 - 17 mmol/L    GFR Non-African American >60 >60 mL/min    GFR African American >60 >60 mL/min    GFR Comment         Magnesium    Collection Time: 03/15/22  9:56 AM   Result Value Ref Range    Magnesium 1.8 1.6 - 2.6 mg/dL   Phosphorus    Collection Time: 03/15/22  9:56 AM   Result Value Ref Range    Phosphorus 2.2 (L) 2.6 - 4.5 mg/dL   Vancomycin Level, Random    Collection Time: 03/15/22  9:56 AM   Result Value Ref Range    Vancomycin Rm 22.7 ug/mL    Vancomycin Random Dose amount 1500 MG     Vancomycin Random Date last dose 3,152,022     Vancomycin Random Time last dose 530    POC Glucose Fingerstick    Collection Time: 03/15/22 10:16 AM   Result Value Ref Range    POC Glucose 184 (H) 65 - 105 mg/dL   POC Glucose Fingerstick    Collection Time: 03/15/22 11:26 AM   Result Value Ref Range    POC Glucose 168 (H) 65 - 105 mg/dL   Basic Metabolic Panel Collection Time: 03/15/22  1:56 PM   Result Value Ref Range    Glucose 148 (H) 70 - 99 mg/dL    BUN 8 6 - 20 mg/dL    CREATININE 0.61 0.50 - 0.90 mg/dL    Calcium 7.2 (L) 8.6 - 10.4 mg/dL    Sodium 132 (L) 135 - 144 mmol/L    Potassium 3.6 (L) 3.7 - 5.3 mmol/L    Chloride 104 98 - 107 mmol/L    CO2 20 20 - 31 mmol/L    Anion Gap 8 (L) 9 - 17 mmol/L    GFR Non-African American >60 >60 mL/min    GFR African American >60 >60 mL/min    GFR Comment         Magnesium    Collection Time: 03/15/22  1:56 PM   Result Value Ref Range    Magnesium 1.8 1.6 - 2.6 mg/dL   Phosphorus    Collection Time: 03/15/22  1:56 PM   Result Value Ref Range    Phosphorus 2.4 (L) 2.6 - 4.5 mg/dL   POC Glucose Fingerstick    Collection Time: 03/15/22  2:07 PM   Result Value Ref Range    POC Glucose 155 (H) 65 - 105 mg/dL   POC Glucose Fingerstick    Collection Time: 03/15/22  4:48 PM   Result Value Ref Range    POC Glucose 207 (H) 65 - 105 mg/dL   Basic Metabolic Panel    Collection Time: 03/15/22  5:39 PM   Result Value Ref Range    Glucose 241 (H) 70 - 99 mg/dL    BUN 8 6 - 20 mg/dL    CREATININE 0.72 0.50 - 0.90 mg/dL    Calcium 7.1 (L) 8.6 - 10.4 mg/dL    Sodium 132 (L) 135 - 144 mmol/L    Potassium 4.0 3.7 - 5.3 mmol/L    Chloride 102 98 - 107 mmol/L    CO2 21 20 - 31 mmol/L    Anion Gap 9 9 - 17 mmol/L    GFR Non-African American >60 >60 mL/min    GFR African American >60 >60 mL/min    GFR Comment         Magnesium    Collection Time: 03/15/22  5:39 PM   Result Value Ref Range    Magnesium 1.8 1.6 - 2.6 mg/dL   Phosphorus    Collection Time: 03/15/22  5:39 PM   Result Value Ref Range    Phosphorus 2.9 2.6 - 4.5 mg/dL   POC Glucose Fingerstick    Collection Time: 03/15/22  8:38 PM   Result Value Ref Range    POC Glucose 166 (H) 65 - 105 mg/dL   CBC with Auto Differential    Collection Time: 03/16/22  3:56 AM   Result Value Ref Range    WBC 5.1 3.5 - 11.0 k/uL    RBC 3.68 (L) 4.0 - 5.2 m/uL    Hemoglobin 11.1 (L) 12.0 - 16.0 g/dL Hematocrit 33.4 (L) 36 - 46 %    MCV 90.6 80 - 100 fL    MCH 30.1 26 - 34 pg    MCHC 33.2 31 - 37 g/dL    RDW 14.4 11.5 - 14.9 %    Platelets 34 (L) 868 - 450 k/uL    MPV 11.4 6.0 - 12.0 fL    Seg Neutrophils 75 (H) 36 - 66 %    Lymphocytes 8 (L) 24 - 44 %    Monocytes 16 (H) 1 - 7 %    Eosinophils % 1 0 - 4 %    Basophils 0 0 - 2 %    Segs Absolute 3.82 1.3 - 9.1 k/uL    Absolute Lymph # 0.41 (L) 1.0 - 4.8 k/uL    Absolute Mono # 0.82 0.1 - 1.3 k/uL    Absolute Eos # 0.05 0.0 - 0.4 k/uL    Basophils Absolute 0.00 0.0 - 0.2 k/uL    Morphology ANISOCYTOSIS PRESENT     Morphology HYPOCHROMIA PRESENT    Basic Metabolic Panel    Collection Time: 22  3:56 AM   Result Value Ref Range    Glucose 239 (H) 70 - 99 mg/dL    BUN 7 6 - 20 mg/dL    CREATININE 0.68 0.50 - 0.90 mg/dL    Calcium 7.1 (L) 8.6 - 10.4 mg/dL    Sodium 132 (L) 135 - 144 mmol/L    Potassium 3.9 3.7 - 5.3 mmol/L    Chloride 101 98 - 107 mmol/L    CO2 21 20 - 31 mmol/L    Anion Gap 10 9 - 17 mmol/L    GFR Non-African American >60 >60 mL/min    GFR African American >60 >60 mL/min    GFR Comment         Magnesium    Collection Time: 22  3:56 AM   Result Value Ref Range    Magnesium 1.8 1.6 - 2.6 mg/dL   Phosphorus    Collection Time: 22  3:56 AM   Result Value Ref Range    Phosphorus 2.4 (L) 2.6 - 4.5 mg/dL       Pulse Ox:  SpO2  Av.9 %  Min: 88 %  Max: 94 %    Supplemental O2: O2 Flow Rate (L/min): 2 L/min     Current Meds:    insulin glargine  35 Units SubCUTAneous Daily    levofloxacin  750 mg IntraVENous Q24H    dilTIAZem  30 mg Oral 4 times per day    insulin lispro  0-18 Units SubCUTAneous TID WC    insulin lispro  0-9 Units SubCUTAneous Nightly    enoxaparin  1 mg/kg SubCUTAneous BID    miconazole   Topical BID    lamoTRIgine  200 mg Oral Daily    pregabalin  50 mg Oral TID    buPROPion  300 mg Oral QAM    FLUoxetine  20 mg Oral Daily    pravastatin  40 mg Oral Daily    [Held by provider] lisinopril  20 mg Oral Daily  sodium chloride flush  5-40 mL IntraVENous 2 times per day    famotidine (PEPCID) injection  20 mg IntraVENous BID    sodium chloride flush  5-40 mL IntraVENous 2 times per day          VITAL SIGNS:    /63   Pulse 110   Temp 99.4 °F (37.4 °C) (Oral)   Resp 24   Ht 5' 8\" (1.727 m)   Wt 280 lb (127 kg)   SpO2 91%   BMI 42.57 kg/m²  2 L/min      Admit Weight:  280 lb (127 kg)    Last 3 weights: Wt Readings from Last 3 Encounters:   03/12/22 280 lb (127 kg)   02/01/22 280 lb (127 kg)   12/31/21 280 lb (127 kg)       BMI: Body mass index is 42.57 kg/m². INPUT/OUTPUT:          Intake/Output Summary (Last 24 hours) at 3/16/2022 0659  Last data filed at 3/16/2022 0538  Gross per 24 hour   Intake 3072.41 ml   Output 6150 ml   Net -3077.59 ml         Telemetry shows sinus tachycardia. EXAM:     General appearance: awake, alert. Laying in bed comfortably. Pleasant. Chest:   [address] air entry bilaterally. No tenderness. No rhonchi or wheezing. Cardiac: Tachycardic. Regular rate and rhythm. No significant murmur or gallop or rubs. Abdomen: soft, non-tender. Extremities: no cyanosis, no clubbing, no calf tenderness, no leg edema. Skin:  warm and dry. Neuro:  Able to move all 4 extremities. No new gross focal deficits. 2 D ECHOCARDIOGRAM 3/14/2022:     Summary   Normal left ventricle size and function with an estimated EF > 55%. No segmental wall motion abnormalities seen. Mild left ventricular hypertrophy. Normal right ventricular size and function. No significant valvular regurgitation or stenosis seen. No significant pericardial effusion is seen.         ASSESSMENT:     Paroxysmal atrial fibrillation /flutter with RVR.   Suspect supraventricular tachycardia reported is atrial flutter with 2:1 AV conduction  03/14/2022 AM upon review of rhythm strips. Currently sinus tachycardia.  Low CHADS2 Vasc risk score.      Sinus tachycardia.     Normal LV systolic function.     Diabetic

## 2022-03-16 NOTE — PLAN OF CARE
Platelets dropped to 34<65<104<278 in 4 days. She was on Lovenox which is being held. Borderline sepsis, on Levaquin. No overt bleeding or bruises noted. Called Heme/onc and updated Dr Candida Nuñez on current status. They will follow.      Electronically signed by Neville Murdock MD on 3/16/2022 at 12:20 PM

## 2022-03-16 NOTE — PROGRESS NOTES
[facility] -   Infectious diseases evaluation  admission date 3/12/2022    reason for consultation:   Fever    Impression :   Current:  · Multifocal community-acquired pneumonia  · UTI  · Sepsis secondary to above  · DKA  · Acute alcohol intoxication /hepatitis  · Paroxysmal atrial fibrillation  · Pancreatitis  · Fatty liver  · Thrombocytopenia    Recommendations      · IV Levaquin  · Nasal swab for MRSA was negative  · Vancomycin was started 3/13/2022 discontinued 3/15/2022  · IV cefepime and Flagyl discontinued 3/15/2022  · Follow blood cultures, no growth to date  · Hepatitis panel   · Supportive care            History of Present Illness:   Initial history:  Aissatou Veloz is a 42y.o.-year-old female was brought to the hospital per her family for generalized weakness, reportedly the patient was drinking alcohol all night and her  was having difficulty getting her to the bathroom in the morning so he called EMS. At the ER she was tachycardic and hypoxic was placed on oxygen per nasal cannula. Lactic acid was elevated, liver enzymes elevated, ethanol level was 404  Chest x-ray showed diffuse interstitial opacities  Urine culture on 3/13/2021 grew KLEBSIELLA PNEUMONIAE and ENTEROCOCCUS FAECALIS  3/13/2022 stool for C. difficile and GI panel negative  Respiratory panel with COVID-19 was negative  Interval changes  3/16/2022   Patient was seen at ICU, up to the chair, feeling better, temperature max was 100 yesterday, 99.4 today, still coughing with white phlegm, denied increased shortness of breath, denied abdominal pain, no new complaints.     Patient Vitals for the past 8 hrs:   BP Temp Temp src Pulse Resp SpO2   03/16/22 1122 -- -- -- -- -- 93 %   03/16/22 1100 103/71 -- -- 113 28 97 %   03/16/22 0900 -- -- -- 116 27 91 %   03/16/22 0800 121/77 99.4 °F (37.4 °C) Axillary 115 29 91 %   03/16/22 0715 -- -- -- 108 24 92 %   03/16/22 0700 (!) 92/56 -- -- 107 23 92 %   03/16/22 0645 -- -- -- 109 23 92 %   03/16/22 0630 -- -- -- 110 24 91 %   03/16/22 0615 -- -- -- 109 25 91 %   03/16/22 0600 103/63 -- -- 110 21 91 %           I have personally reviewed the past medical history, past surgical history, medications, social history, and family history, and I haveupdated the database accordingly. Allergies:   Asa [aspirin], Betadine [povidone iodine], Celexa [citalopram hydrobromide], Citalopram, Lasix [furosemide], Macrobid [nitrofurantoin], Norco [hydrocodone-acetaminophen], Betadine [povidone iodine], Codeine, Lithium, Paroxetine, Paxil [paroxetine hcl], Tape [adhesive tape], and Tegretol [carbamazepine]     Review of Systems:     Review of Systems  As per history of present illness, other than above 12 system review was negative  Physical Examination :   Physical Exam  Constitutional:       General: She is not in acute distress. Appearance: Normal appearance. HENT:      Head: Normocephalic and atraumatic. Right Ear: External ear normal.      Left Ear: External ear normal.      Nose: Nose normal.   Eyes:      Extraocular Movements: Extraocular movements intact. Cardiovascular:      Rate and Rhythm: Regular rhythm. Tachycardia present. Pulses: Normal pulses. Heart sounds: Normal heart sounds. No murmur heard.       Pulmonary:      Effort: Pulmonary effort is normal. No respiratory distress. Breath sounds: Normal breath sounds. No wheezing. Abdominal:      General: Bowel sounds are normal. There is no distension. Palpations: Abdomen is soft. Tenderness: There is no abdominal tenderness. There is no guarding or rebound. Musculoskeletal:      Right lower leg: No edema. Left lower leg: No edema. Skin:     General: Skin is warm. Coloration: Skin is not jaundiced. Neurological:      General: No focal deficit present. Mental Status: She is alert and oriented to person, place, and time. Mental status is at baseline. Psychiatric:         Mood and Affect: Mood normal.         Behavior: Behavior normal.         Thought Content:  Thought content normal.        Physical Exam    Past Medical History:     Past Medical History:   Diagnosis Date    Alcohol abuse     sober [id number]    Anxiety     Arthritis     Painting esophagus     Benzodiazepine overdose 9/26/2015    Bipolar disorder, unspecified ([address]     Bipolar I disorder, most recent episode depressed, severe without psychotic features ([address]     Cellulitis 11/17/2018    Chronic abdominal wound infection 9/27/2015    Constipation 9/3/2019    Depression 7/12/2015    Drug overdose, intentional ([address] 7/12/2015    Genital herpes, unspecified     GERD (gastroesophageal reflux disease)     History of pulmonary embolism     Hx of blood clots dx 2 years ago    clots in both legs and lungs     Hypertension     Iron deficiency anemia     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    Lumbosacral spondylosis without myelopathy     MDRO (multiple drug resistant organisms) resistance 2010    MRSA (abd)    Miscarriage     multiple, around 7th mos pregnant each time d/t hypercoagulation    Morbid obesity ([address]     MRSA (methicillin resistant staph aureus) culture positive 02/10/2017    urine    Overdose of benzodiazepine     Pernicious anemia     Primary hypercoagulable state ([address]     antiphospholipid antibodies on Arixtra shots daily    Pulmonary embolism ([address] 2010    Suicidal behavior 12/14/2015    Suicidal ideation     Suicide attempt ([address] 2014    hx OD on painkillers and rubbing alcohol    SVT (supraventricular tachycardia) ([address] 04/07/2017    Toxic effect of ethanol, intentional self-harm ([address] 12/16/2015    Type 2 diabetes mellitus, with long-term current use of insulin ([address]        Past Surgical  History:     Past Surgical History:   Procedure Laterality Date    ABDOMINAL HERNIA REPAIR  2014    wound vac    ABDOMINAL HERNIA REPAIR  11/3/14    BARIATRIC SURGERY  2013    [facility]    CHOLECYSTECTOMY      DILATION AND CURETTAGE OF UTERUS  2005    ENDOSCOPY, COLON, DIAGNOSTIC      EGD    FINGER AMPUTATION Left 02/09/2015    index and ring finger. from frostbite    HERNIA REPAIR      total of 8    IVC FILTER INSERTION      LIVER RESECTION      for hepatic adenoma    LYMPH NODE BIOPSY  1990    JUSTINE AND BSO  2011    TONSILLECTOMY         Medications:      insulin glargine  50 Units SubCUTAneous Daily    pravastatin  40 mg Oral Nightly    dilTIAZem  30 mg Oral 3 times per day    chlordiazePOXIDE  25 mg Oral TID    levofloxacin  750 mg IntraVENous Q24H    insulin lispro  0-18 Units SubCUTAneous TID WC    insulin lispro  0-9 Units SubCUTAneous Nightly    miconazole   Topical BID    lamoTRIgine  200 mg Oral Daily    buPROPion  300 mg Oral QAM    FLUoxetine  20 mg Oral Daily    [Held by provider] lisinopril  20 mg Oral Daily    sodium chloride flush  5-40 mL IntraVENous 2 times per day    famotidine (PEPCID) injection  20 mg IntraVENous BID    sodium chloride flush  5-40 mL IntraVENous 2 times per day       Social History:     Social History     Socioeconomic History    Marital status:      Spouse name: Not on file    Number of children: 1    Years of education: 3 years of college    Highest education level: Not on file   Occupational History    Occupation: infant care     Comment: last worked 2008   Tobacco Use    Smoking status: Never Smoker    Smokeless tobacco: Never Used   Substance and Sexual Activity    Alcohol use: No     Alcohol/week: 0.0 standard drinks     Comment: Last alcohol use was in 12/2015    Drug use: No     Comment: Pt stole her mom's Benzo 1 yr ago. Pt said she took more than prescribed dose of Valium once in late 90's.     Sexual activity: Yes     Partners: Male   Other Topics Concern    Not on file   Social History Narrative    Lives with Sherine Cox ex  and daughter          Social Determinants of Health Financial Resource Strain:     Difficulty of Paying Living Expenses: Not on file   Food Insecurity:     Worried About Running Out of Food in the Last Year: Not on file    Geronimo of Food in the Last Year: Not on file   Transportation Needs:     Lack of Transportation (Medical): Not on file    Lack of Transportation (Non-Medical):  Not on file   Physical Activity:     Days of Exercise per Week: Not on file    Minutes of Exercise per Session: Not on file   Stress:     Feeling of Stress : Not on file   Social Connections:     Frequency of Communication with Friends and Family: Not on file    Frequency of Social Gatherings with Friends and Family: Not on file    Attends Congregation Services: Not on file    Active Member of [address] [place] [address] [employer] or Organizations: Not on file    Attends Club or Organization Meetings: Not on file    Marital Status: Not on file   Intimate Partner Violence:     Fear of Current or Ex-Partner: Not on file    Emotionally Abused: Not on file    Physically Abused: Not on file    Sexually Abused: Not on file   Housing Stability:     Unable to Pay for Housing in the Last Year: Not on file    Number of [place] in the Last Year: Not on file    Unstable Housing in the Last Year: Not on file       Family History:     Family History   Problem Relation Age of Onset    Depression Mother     High Blood Pressure Mother     High Cholesterol Mother     Diabetes Father     Heart Disease Father     Kidney Disease Father     High Blood Pressure Father     High Cholesterol Father     Cancer Maternal Uncle         of duodenum had whipple    Breast Cancer Maternal Aunt     Breast Cancer Maternal Cousin       Medical Decision Making:   I have independently reviewed/ordered the following labs:    CBC with Differential:   Recent Labs     03/15/22  0609 03/16/22  0356   WBC 7.4 5.1   HGB 11.2* 11.1*   HCT 33.5* 33.4*   PLT 65* 34*   LYMPHOPCT 9* 8*   MONOPCT 5 16*     BMP:  Recent Labs 03/15/22  1739 03/16/22  0356   * 132*   K 4.0 3.9    101   CO2 21 21   BUN 8 7   CREATININE 0.72 0.68   MG 1.8 1.8     Hepatic Function Panel:   Recent Labs     03/15/22  0609   PROT 3.9*   LABALBU 1.6*   BILIDIR 0.37*   IBILI 0.32   BILITOT 0.69   ALKPHOS 313*   *   AST 56*     No results for input(s): RPR in the last 72 hours. No results for input(s): HIV in the last 72 hours. No results for input(s): BC in the last 72 hours. Lab Results   Component Value Date    CREATININE 0.68 03/16/2022    GLUCOSE 239 03/16/2022    GLUCOSE 403 06/03/2012       Detailed results: Thank you for allowing us to participate in the care of this patient. Please call with questions. This note is created with the assistance of a speech recognition program.  While intending to generate adocument that actually reflects the content of the visit, the document can still have some errors including those of syntax and sound a like substitutions which may escape proof reading. It such instances, actual meaningcan be extrapolated by contextual diversion.     Maureen Berrios MD  Office: [phone]  Perfect serve / office [phone]

## 2022-03-16 NOTE — PLAN OF CARE
Problem: Falls - Risk of:  Goal: Will remain free from falls  Description: Will remain free from falls  3/16/2022 1516 by Hermann Miller RN  Outcome: Ongoing     Problem: Falls - Risk of:  Goal: Absence of physical injury  Description: Absence of physical injury  3/16/2022 1516 by Hermann Miller RN  Outcome: Ongoing     Problem: Skin Integrity:  Goal: Will show no infection signs and symptoms  Description: Will show no infection signs and symptoms  3/16/2022 1516 by Hermann Miller RN  Outcome: Ongoing     Problem: Skin Integrity:  Goal: Absence of new skin breakdown  Description: Absence of new skin breakdown  3/16/2022 1516 by Hermann Miller RN  Outcome: Ongoing     Problem: Serum Glucose Level - Abnormal:  Goal: Ability to maintain appropriate glucose levels will improve  Description: Ability to maintain appropriate glucose levels will improve  3/16/2022 1516 by Hermann Miller RN  Outcome: Ongoing     Problem: Pain:  Goal: Pain level will decrease  Description: Pain level will decrease  Outcome: Ongoing     Problem: Pain:  Goal: Control of acute pain  Description: Control of acute pain  Outcome: Ongoing

## 2022-03-16 NOTE — PROGRESS NOTES
Physical Therapy    Facility/Department: [facility] ICU  Initial Assessment    NAME: Clarissa Floyd  : 1980  MRN: [id number]    Date of Service: 3/16/2022    Discharge Recommendations:  Continue to assess pending progress,Patient would benefit from continued therapy after discharge   PT Equipment Recommendations  Other: TBD    Assessment   Body structures, Functions, Activity limitations: Decreased functional mobility ; Decreased ADL status; Decreased strength;Decreased endurance;Decreased balance  Assessment: Pt alert, agreeable to PT. Pt was unsteady with gait and reported some dizziness, will continue to progress pt as able to PLOF. Treatment Diagnosis: Impaired functional mobility   Specific instructions for Next Treatment: progress gait, stairs, HEP  Prognosis: Good  Decision Making: Medium Complexity  Exam: ROM, MMT, bed mobility, transfers, amb, balance  Clinical Presentation: Pt alert, cooperative  Barriers to Learning: none  REQUIRES PT FOLLOW UP: Yes  Activity Tolerance  Activity Tolerance: Patient Tolerated treatment well       Patient Diagnosis(es): The encounter diagnosis was Diabetic ketoacidosis without coma associated with type 2 diabetes mellitus ([facility] [address]      has a past medical history of Alcohol abuse, Anxiety, Arthritis, Painting esophagus, Benzodiazepine overdose, Bipolar disorder, unspecified ([address] Bipolar I disorder, most recent episode depressed, severe without psychotic features ([address] Cellulitis, Chronic abdominal wound infection, Constipation, Depression, Drug overdose, intentional ([address] Genital herpes, unspecified, GERD (gastroesophageal reflux disease), History of pulmonary embolism, Hx of blood clots, Hypertension, Iron deficiency anemia, Isopropyl alcohol poisoning, Lumbosacral spondylosis without myelopathy, MDRO (multiple drug resistant organisms) resistance, Miscarriage, Morbid obesity ([address] MRSA (methicillin resistant staph aureus) culture positive, Overdose of benzodiazepine, Pernicious anemia, Primary hypercoagulable state ([facility] [address] Pulmonary embolism ([address] Suicidal behavior, Suicidal ideation, Suicide attempt ([address] SVT (supraventricular tachycardia) ([address] Toxic effect of ethanol, intentional self-harm ([facility] [address] and Type 2 diabetes mellitus, with long-term current use of insulin ([facility] [address] has a past surgical history that includes Cholecystectomy; Liver Resection; hernia repair; Tonsillectomy; Endoscopy, colon, diagnostic; Abdominal hernia repair (2014); Abdominal hernia repair (11/3/14); Bariatric Surgery (2013); Dilation and curettage of uterus (2005); Finger amputation (Left, 02/09/2015); lymph node biopsy (1990); yariel and bso (cervix removed) (2011); and IVC filter insertion. Restrictions  Restrictions/Precautions  Restrictions/Precautions: General Precautions,Fall Risk,Contact Precautions  Required Braces or Orthoses?: No  Implants present? : Metal implants (IVC filter)  Position Activity Restriction  Other position/activity restrictions: PT eval and treat  Vision/Hearing  Vision: Impaired  Vision Exceptions: Wears glasses at all times  Hearing: Within functional limits     Subjective  General  Chart Reviewed: Yes  Patient assessed for rehabilitation services?: Yes  Additional Pertinent Hx: T2DM, CIWA  Family / Caregiver Present: No  Referring Practitioner: Paula Brown MD  Referral Date : 03/16/22  Diagnosis: DKA, type 2  Follows Commands: Within Functional Limits  Subjective  Subjective: Pt in bed, agreeable to PT eval. PEBBLES Baeza. Reports pt was out of bed to toilet earlier and reported dizziness.    Pain Screening  Patient Currently in Pain: Yes  Pain Assessment  Pain Assessment: 0-10  Pain Level: 5  Pain Type: Chronic pain  Pain Location: Generalized  Pain Orientation: Right  Pain Descriptors: Burning  Pain Frequency: Continuous  Pain Onset: On-going  Clinical Progression: Not changed  Functional Pain Assessment: Activities are not prevented  Non-Pharmaceutical Pain Intervention(s): Ambulation/Increased Activity; Distraction;Repositioned; Rest  Response to Pain Intervention: None  Vital Signs  Patient Currently in Pain: Yes  Oxygen Therapy  SpO2: 93 %  O2 Device: Nasal cannula  O2 Flow Rate (L/min): 3 L/min       Orientation  Orientation  Overall Orientation Status: Within Normal Limits  Social/Functional History  Social/Functional History  Lives With: Spouse  Type of Home: Mobile home  Home Layout: One level  Home Access: Stairs to enter with rails  Entrance Stairs - Number of Steps: 5  Entrance Stairs - Rails: Right  Bathroom Shower/Tub: Tub/Shower unit,Curtain  Bathroom Toilet: Handicap height  Bathroom Equipment: Hand-held shower  Bathroom Accessibility: Accessible  Home Equipment:  (no DME)  ADL Assistance: Independent  Homemaking Assistance: Independent  Homemaking Responsibilities: Yes  Ambulation Assistance: Independent (no DME)  Transfer Assistance: Independent  Active : Yes  Mode of Transportation: Car  Occupation: On disability  IADL Comments: sleeps in recliner  Additional Comments:  works full time, factory work 4p-8p shifts. Has mother that can provide SBA. No recent PT. Cognition        Objective          AROM RLE (degrees)  RLE AROM: WFL  AROM LLE (degrees)  LLE AROM : WFL  AROM RUE (degrees)  RUE AROM : WFL  AROM LUE (degrees)  LUE AROM : WFL  Strength RLE  Strength RLE: WFL  Comment: Grossly 4-/5  Strength LLE  Strength LLE: WFL  Comment: Grossly 4-/5  Strength RUE  Strength RUE: WFL  Comment: Grossly 4-/5  Strength LUE  Strength LUE: WFL  Comment: Grossly 4-/5     Sensation  Overall Sensation Status: Impaired (numbness in B hands (chronic))  Bed mobility  Rolling to Left: Stand by assistance  Supine to Sit: Stand by assistance  Sit to Supine: Unable to assess  Scooting: Stand by assistance  Comment: Head of bed slightly elevated, pt using bed rail to edge of bed. Mild dizziness reported. pt up in chair end of session.   Transfers  Sit to Stand: Contact guard assistance  Stand to sit: Contact guard assistance  Bed to Chair: Contact guard assistance  Stand Pivot Transfers: Contact guard assistance  Comment: CGA, cues for hand placement using RW. Ambulation  Ambulation?: Yes  Ambulation 1  Surface: level tile  Device: Rolling Walker  Assistance: Contact guard assistance  Quality of Gait: normal mookie, mild unsteadiness  Distance: 5' with 90 degree turn  Comments: Pt appears slightly unsteady. Uses RW forsupport. Stairs/Curb  Stairs?: No     Balance  Posture: Good  Sitting - Static: Good  Sitting - Dynamic: Good  Standing - Static: Good;-  Standing - Dynamic: Good;-;Fair;+  Comments: Fall risk, standing balance with RW        Plan   Plan  Times per week: 5x/week  Specific instructions for Next Treatment: progress gait, stairs, HEP  Current Treatment Recommendations: Strengthening,Balance Training,Functional Mobility Training,Transfer Training,Endurance Training,Gait Training,Stair training,Equipment Evaluation, Education, & procurement,Patient/Caregiver Education & Training,Safety Education & Training,Home Exercise Program  Safety Devices  Type of devices: All fall risk precautions in place,Call light within reach,Gait belt,Patient at risk for falls,Left in chair,Nurse notified (PEBBLES Borja)    G-Code       OutComes Score                                                  AM-PAC Score  AM-PAC Inpatient Mobility Raw Score : 16 (03/16/22 1120)  AM-PAC Inpatient T-Scale Score : 40.78 (03/16/22 1120)  Mobility Inpatient CMS 0-100% Score: 54.16 (03/16/22 1120)  Mobility Inpatient CMS G-Code Modifier : CK (03/16/22 1120)          Goals  Short term goals  Time Frame for Short term goals: 5 days  Short term goal 1: Pt to demo IND bed mobility. Short term goal 2: Pt to demo IND transfers. Short term goal 3: Pt to amb 100' with device prn, supervision. Short term goal 4: Pt to ascend/descend 5 stairs 1 HR, CGA/SBA.   Short term goal 5: Pt to demo good technique for HEP and determine need for AD for d/c. Patient Goals   Patient goals :  To go home       Therapy Time   Individual Concurrent Group Co-treatment   Time In 1120         Time Out 1145         Minutes 25         Timed Code Treatment Minutes: 8 Minutes       Maldonado Lombardi PT

## 2022-03-16 NOTE — CARE COORDINATION
ONGOING DISCHARGE PLAN:    Patient is alert and oriented x4. Spoke with patient regarding discharge plan and patient confirms that plan is for LSW to see in regards to Inpatient, Alchol Rehab. Pt. Has been to [address] [facility] , in the past.     Pt is from Home w/ . Wants Lisha RAO, from [facility] to follow. Pt. Wants Walker, needs orders. Remains on IV Levaquin. Temp today, 100.2/100.6    Sating 93% on 3LNC. . Follow for Home O2. Pulmonary on board. Will continue to follow for additional discharge needs.     Electronically signed by Jeferson Downey RN on 3/16/2022 at 5:01 PM

## 2022-03-16 NOTE — PROGRESS NOTES
Writer rounded with Dr. Ericka Jacobo, who ordered blood work regarding the pt's elevated D-dimer and decreased platelet count (see results), assessed pt, and stated to resume at home medication Arixtra in place of DVT prophylaxis Lovenox once platelets are above 50. Writer to update oncoming nursing staff.

## 2022-03-16 NOTE — PLAN OF CARE
CT +ve for multilobar PE. Repeat platelets are at 66U. Discussed with Dr Joselito Marshall (Hematologist), he is okay to start Arixtra at this time. Pharmacy to dose.     Electronically signed by Erika Al MD on 3/16/2022 at 6:13 PM

## 2022-03-16 NOTE — PROGRESS NOTES
Pulmonary Progress Note  [facility]      Patient - Luis Avilez,  Age - 42 y.o.    - 1980      Room Number -    MRN -  [id number]   Acct # - [de-identified]  Date of Admission -  3/12/2022  7:42 AM    Consulting Service/Physician   Consulting - Sheree Lynn MD  Primary Care Physician - [facility]   Patient appears to be in decent spirits. Not on any continuous drips at this time  Patient's mother present at bedside. OBJECTIVE   VITALS    height is 5' 8\" (1.727 m) and weight is 280 lb (127 kg). Her axillary temperature is 99.4 °F (37.4 °C). Her blood pressure is 103/71 and her pulse is 113. Her respiration is 28 and oxygen saturation is 93%. Body mass index is 42.57 kg/m². Temperature Range: Temp: 99.4 °F (37.4 °C) Temp  Av.3 °F (37.4 °C)  Min: 98.5 °F (36.9 °C)  Max: 100 °F (37.8 °C)  BP Range:  Systolic (36WND), EFT:336 , Min:92 , LDR:125     Diastolic (51ZNZ), APT:98, Min:48, Max:117    Pulse Range: Pulse  Av.3  Min: 106  Max: 120  Respiration Range: Resp  Av.1  Min: 17  Max: 29  Current Pulse Ox[de-identified]  SpO2: 93 %  24HR Pulse Ox Range:  SpO2  Av.5 %  Min: 88 %  Max: 97 %  Oxygen Amount and Delivery: O2 Flow Rate (L/min): 3 L/min    Wt Readings from Last 3 Encounters:   22 280 lb (127 kg)   22 280 lb (127 kg)   21 280 lb (127 kg)       I/O (24 Hours)    Intake/Output Summary (Last 24 hours) at 3/16/2022 1304  Last data filed at 3/16/2022 0538  Gross per 24 hour   Intake 1440 ml   Output 4150 ml   Net -2710 ml       EXAM     General Appearance  Awake, alert, oriented, in no acute distress  HEENT - normocephalic, atraumatic. Neck - Supple,  trachea midline   Lungs -breath sounds are crackles rales or wheeze  Heart Exam:PMI normal. No lifts, heaves, or thrills. RRR. No murmurs, clicks, gallops, or rubs  Abdomen Exam: Abdomen soft, non-tender.  BS normal. No masses  Extremity Exam: No signs of cyanosis    MEDS      insulin glargine  50 Units SubCUTAneous Daily    pravastatin  40 mg Oral Nightly    dilTIAZem  30 mg Oral 3 times per day    chlordiazePOXIDE  25 mg Oral TID    levofloxacin  750 mg IntraVENous Q24H    insulin lispro  0-18 Units SubCUTAneous TID WC    insulin lispro  0-9 Units SubCUTAneous Nightly    miconazole   Topical BID    lamoTRIgine  200 mg Oral Daily    buPROPion  300 mg Oral QAM    FLUoxetine  20 mg Oral Daily    [Held by provider] lisinopril  20 mg Oral Daily    sodium chloride flush  5-40 mL IntraVENous 2 times per day    famotidine (PEPCID) injection  20 mg IntraVENous BID    sodium chloride flush  5-40 mL IntraVENous 2 times per day      dextrose      sodium chloride       HYDROcodone 5 mg - acetaminophen, perflutren lipid microspheres, metoprolol, sodium chloride flush, glucose, dextrose, glucagon (rDNA), dextrose, potassium chloride, magnesium sulfate, sodium phosphate IVPB **OR** sodium phosphate IVPB **OR** sodium phosphate IVPB, polyethylene glycol, sodium chloride flush, sodium chloride, ondansetron **OR** ondansetron, acetaminophen **OR** acetaminophen    LABS   CBC   Recent Labs     03/16/22  0356   WBC 5.1   HGB 11.1*   HCT 33.4*   MCV 90.6   PLT 34*     BMP:   Lab Results   Component Value Date     03/16/2022    K 3.9 03/16/2022     03/16/2022    CO2 21 03/16/2022    BUN 7 03/16/2022    LABALBU 1.6 03/15/2022    LABALBU 3.9 06/03/2012    CREATININE 0.68 03/16/2022    CALCIUM 7.1 03/16/2022    GFRAA >60 03/16/2022    LABGLOM >60 03/16/2022     ABGs:  Lab Results   Component Value Date    PHART 7.437 01/20/2016    PO2ART 90.3 01/20/2016    NWG4OSZ 40.7 01/20/2016      Lab Results   Component Value Date    MODE NOT REPORTED 09/25/2017     Ionized Calcium:  No results found for: IONCA  Magnesium:    Lab Results   Component Value Date    MG 1.8 03/16/2022     Phosphorus:    Lab Results   Component Value Date    PHOS 2.4 03/16/2022        LIVER PROFILE   Recent Labs     03/15/22  0609   AST 56*   *   LIPASE 66*   BILIDIR 0.37*   BILITOT 0.69   ALKPHOS 313*     INR   Recent Labs     03/13/22  1851   INR 1.2     PTT   Lab Results   Component Value Date    APTT 35.1 03/12/2022         RADIOLOGY     (See actual reports for details)    ASSESSMENT/PLAN     Patient Active Problem List   Diagnosis    Pernicious anemia    History of ulcer disease    History of DVT of lower extremity    Primary hypercoagulable state ([address]    Amputation finger    GERD (gastroesophageal reflux disease)    Alcohol dependence in remission ([address]    Type 2 diabetes mellitus with diabetic polyneuropathy, with long-term current use of insulin ([address]    Primary insomnia    Essential hypertension    Bipolar affective disorder in remission ([address]    History of pulmonary embolism    Antiphospholipid syndrome ([address]    Alcohol intoxication ([address]    Hypertriglyceridemia    Chronic post-traumatic stress disorder (PTSD)    OCD (obsessive compulsive disorder)    Severe benzodiazepine use disorder ([address]    Morbid obesity with BMI of 50.0-59.9, adult ([address]    History of MRSA infection    Pain of upper abdomen    Painting esophagus    NAFLD (nonalcoholic fatty liver disease)    Nondependent opioid abuse in remission ([address]    Osteopenia    History of Awa-en-Y gastric bypass    Herpes genitalis    History of drug abuse ([address]    Malabsorption    History of pulmonary embolus (PE)    Vitamin B 12 deficiency    Vitamin D deficiency    History of surgery of liver    Blood alkaline phosphatase increased compared with prior measurement    Paroxysmal SVT (supraventricular tachycardia) (HCC)    Anxiety    Hypomagnesemia    Chronic idiopathic constipation    Recurrent incisional hernia    History of small bowel obstruction    History of peptic ulcer    Fibromyalgia    Polyneuropathy    COVID-19 virus infection    DKA, type 2, not at goal [facility])    GAIL (acute kidney injury) ([facility] [address] [facility]    Acute pancreatitis    Multifocal pneumonia    Alcoholic hepatitis     Acute alcohol intoxication  Severe diabetic keto/metabolic acidosis (anion gap, lactic acid) patient currently    Morbid obesity, status post bariatric surgery 2013  SAMIR  Urinary tract infection. Thrombocytopenia --- current platelet count 54K    Patient appears to be deconditioned. Physical therapy and occupational therapy working with patient. On Levaquin.   We will check d dimer patient did mention to his mother that she gets short of breath but did not tell me    Transfer out of ICU at other services  Electronically signed by Hilaria Wang MD on 3/16/2022 at 1:04 PM

## 2022-03-16 NOTE — PROGRESS NOTES
[facility]    PROGRESS NOTE             3/15/2022    10:41 PM    Name:   Magalie Miguel  MRN:     [id number]     Acct:      [de-identified]   Room:   2009/2009-01  IP Day:  3  Admit Date:  3/12/2022  7:42 AM    PCP:  Dorothea Chambers  Code Status:  Full Code    Subjective:     C/C:   Chief Complaint   Patient presents with    Alcohol Intoxication     Interval History Status: improved. No acute events overnight. Patient seen at bedside this morning. Patient tolerating clear liquid diet with no nausea vomiting. Patient denies any abdominal pain, chest pain, or shortness of breath at this time. Patient understands that her situation has been precipitated by her alcohol intake and is committed to abstaining. She agrees to the plan of care. Brief History:     Patient is a 54-year-old female with medical history of anxiety, bipolar disorder, history of pulmonary embolism, hypertension, iron deficiency anemia, suicidal ideation, type 2 diabetes mellitus presents with chief complaint of alcohol intoxication. Patient reports that she has not been taking insulin for the last few days as she \"forgot\". Patient was quite somnolent at time of exam.  She did not report any significant pain and significant alcohol use last night.     In the ED, patient was found to be tachycardic and hypoxic. Patient was placed on 2 L of oxygen via nasal cannula. Patient's VBG's were remarkable for pH of 7.187. Other remarkable labs include sodium of 111, creatinine of 1.50, lactic acid of 10.2, glucose of 1255, alkaline phosphatase of 411, ALT of 504, AST of 2478, ethanol level of 404.   Remarkable studies include:  · Chest x-ray showing diffuse interstitial opacities in the perihilar areas consistent with multi focal airspace disease  · CT head without contrast showing no acute intracranial abnormality     Patient was treated in the ED with fluids and insulin continuous infusion. Patient was placed on DKA protocol and decision was made to admit the patient to the ICU with consults in place for pulmonology critical care. Review of Systems:     Review of Systems   Constitutional: Negative for appetite change, chills, fatigue and fever. HENT: Negative for congestion and sore throat. Eyes: Negative for redness. Respiratory: Negative for chest tightness, shortness of breath and wheezing. Cardiovascular: Negative for chest pain, palpitations and leg swelling. Gastrointestinal: Negative for abdominal pain, nausea and vomiting. Genitourinary: Negative for dysuria and hematuria. Musculoskeletal: Negative for back pain. Skin: Negative for color change. Neurological: Negative for dizziness, weakness, light-headedness and headaches. Psychiatric/Behavioral: Negative for confusion and decreased concentration. Medications:      Allergies:  Asa [aspirin], Betadine [povidone iodine], Celexa [citalopram hydrobromide], Citalopram, Lasix [furosemide], Macrobid [nitrofurantoin], Norco [hydrocodone-acetaminophen], Betadine [povidone iodine], Codeine, Lithium, Paroxetine, Paxil [paroxetine hcl], Tape [adhesive tape], and Tegretol [carbamazepine]    Current Meds:   Scheduled Meds:    insulin glargine  35 Units SubCUTAneous Daily    levofloxacin  750 mg IntraVENous Q24H    dilTIAZem  30 mg Oral 4 times per day    insulin lispro  0-18 Units SubCUTAneous TID WC    insulin lispro  0-9 Units SubCUTAneous Nightly    enoxaparin  1 mg/kg SubCUTAneous BID    miconazole   Topical BID    lamoTRIgine  200 mg Oral Daily    pregabalin  50 mg Oral TID    buPROPion  300 mg Oral QAM    FLUoxetine  20 mg Oral Daily    pravastatin  40 mg Oral Daily    [Held by provider] lisinopril  20 mg Oral Daily    sodium chloride flush  5-40 mL IntraVENous 2 times per day    famotidine (PEPCID) injection  20 mg IntraVENous BID    sodium chloride flush  5-40 mL IntraVENous 2 times per day     Continuous Infusions:    dextrose      sodium chloride       PRN Meds: HYDROcodone 5 mg - acetaminophen, perflutren lipid microspheres, metoprolol, sodium chloride flush, glucose, dextrose, glucagon (rDNA), dextrose, potassium chloride, magnesium sulfate, sodium phosphate IVPB **OR** sodium phosphate IVPB **OR** sodium phosphate IVPB, polyethylene glycol, sodium chloride flush, sodium chloride, ondansetron **OR** ondansetron, acetaminophen **OR** acetaminophen, LORazepam **OR** LORazepam **OR** LORazepam **OR** LORazepam **OR** LORazepam **OR** LORazepam **OR** LORazepam **OR** LORazepam    Data:     Past Medical History:    Past Medical History:   Diagnosis Date    Alcohol abuse     sober since5    Anxiety     Arthritis     Painting esophagus     Benzodiazepine overdose 9/26/2015    Bipolar disorder, unspecified ([address]     Bipolar I disorder, most recent episode depressed, severe without psychotic features ([address]     Cellulitis 11/17/2018    Chronic abdominal wound infection 9/27/2015    Constipation 9/3/2019    Depression 7/12/2015    Drug overdose, intentional ([address] 7/12/2015    Genital herpes, unspecified     GERD (gastroesophageal reflux disease)     History of pulmonary embolism     Hx of blood clots dx 2 years ago    clots in both legs and lungs     Hypertension     Iron deficiency anemia     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    Lumbosacral spondylosis without myelopathy     MDRO (multiple drug resistant organisms) resistance 2010    MRSA (abd)    Miscarriage     multiple, around 7th mos pregnant each time d/t hypercoagulation    Morbid obesity ([address]     MRSA (methicillin resistant staph aureus) culture positive 02/10/2017    urine    Overdose of benzodiazepine     Pernicious anemia     Primary hypercoagulable state ([address]     antiphospholipid antibodies on Arixtra shots daily    Pulmonary embolism ([address] 2010    Suicidal behavior 12/14/2015  Suicidal ideation     Suicide attempt ([facility] 2014    hx OD on painkillers and rubbing alcohol    SVT (supraventricular tachycardia) ([facility] 2017    Toxic effect of ethanol, [facility] 2015    Type 2 diabetes mellitus, with long-term current use of insulin ([facility]        Social History:    Tobacco:   Patient  reports that she has never smoked. She has never used smokeless tobacco.  Alcohol:     Patient  reports no history of alcohol use. Drug Use: Patient  reports no history of drug use. Family History:   Family History   Problem Relation Age of Onset    Depression Mother     High Blood Pressure Mother     High Cholesterol Mother     Diabetes Father     Heart Disease Father     Kidney Disease Father     High Blood Pressure Father     High Cholesterol Father     Cancer Maternal Uncle         of duodenum had whipple    Breast Cancer Maternal Aunt     Breast Cancer Maternal Cousin        Vitals:  BP (!) 148/80   Pulse 114   Temp 100 °F (37.8 °C) (Oral)   Resp 25   Ht 5' 8\" (1.727 m)   Wt 280 lb (127 kg)   SpO2 91%   BMI 42.57 kg/m²   Temp (24hrs), Av.6 °F (37 °C), Min:97.8 °F (36.6 °C), Max:100 °F (37.8 °C)    Vitals:    03/15/22 2115 03/15/22 2130 03/15/22 2145 03/15/22 2200   BP:    (!) 148/80   Pulse: 106 112 110 114   Resp: 23 20 20 25   Temp:       TempSrc:       SpO2: 90% 93% 91% 91%   Weight:       Height:           O2 Requirements: 2 L oxygen via nasal cannula    Lines/Drains/Access: Peripheral IV, PICC double-lumen, urethral catheter    I/O(24Hr):     Intake/Output Summary (Last 24 hours) at 3/15/2022 2241  Last data filed at 3/15/2022 1738  Gross per 24 hour   Intake 5163.33 ml   Output 6000 ml   Net -836.67 ml       Labs:  Recent Results (from the past 24 hour(s))   POC Glucose Fingerstick    Collection Time: 22 11:25 PM   Result Value Ref Range    POC Glucose 176 (H) 65 - 105 mg/dL   POC Glucose Fingerstick    Collection Time: 03/15/22 12:28 AM Result Value Ref Range    POC Glucose 170 (H) 65 - 105 mg/dL   Basic Metabolic Panel    Collection Time: 03/15/22 12:29 AM   Result Value Ref Range    Glucose 189 (H) 70 - 99 mg/dL    BUN 11 6 - 20 mg/dL    CREATININE 0.71 0.50 - 0.90 mg/dL    Calcium 7.0 (L) 8.6 - 10.4 mg/dL    Sodium 131 (L) 135 - 144 mmol/L    Potassium 3.8 3.7 - 5.3 mmol/L    Chloride 103 98 - 107 mmol/L    CO2 20 20 - 31 mmol/L    Anion Gap 8 (L) 9 - 17 mmol/L    GFR Non-African American >60 >60 mL/min    GFR African American >60 >60 mL/min    GFR Comment         Magnesium    Collection Time: 03/15/22 12:29 AM   Result Value Ref Range    Magnesium 2.0 1.6 - 2.6 mg/dL   Phosphorus    Collection Time: 03/15/22 12:29 AM   Result Value Ref Range    Phosphorus 2.1 (L) 2.6 - 4.5 mg/dL   POC Glucose Fingerstick    Collection Time: 03/15/22  1:28 AM   Result Value Ref Range    POC Glucose 197 (H) 65 - 105 mg/dL   POC Glucose Fingerstick    Collection Time: 03/15/22  2:30 AM   Result Value Ref Range    POC Glucose 249 (H) 65 - 105 mg/dL   POC Glucose Fingerstick    Collection Time: 03/15/22  3:34 AM   Result Value Ref Range    POC Glucose 200 (H) 65 - 105 mg/dL   POC Glucose Fingerstick    Collection Time: 03/15/22  4:35 AM   Result Value Ref Range    POC Glucose 148 (H) 65 - 105 mg/dL   POC Glucose Fingerstick    Collection Time: 03/15/22  5:30 AM   Result Value Ref Range    POC Glucose 156 (H) 65 - 105 mg/dL   CBC with Auto Differential    Collection Time: 03/15/22  6:09 AM   Result Value Ref Range    WBC 7.4 3.5 - 11.0 k/uL    RBC 3.76 (L) 4.0 - 5.2 m/uL    Hemoglobin 11.2 (L) 12.0 - 16.0 g/dL    Hematocrit 33.5 (L) 36 - 46 %    MCV 89.0 80 - 100 fL    MCH 29.8 26 - 34 pg    MCHC 33.5 31 - 37 g/dL    RDW 14.3 11.5 - 14.9 %    Platelets 65 (L) 151 - 450 k/uL    MPV 8.1 6.0 - 12.0 fL    Seg Neutrophils 85 (H) 36 - 66 %    Lymphocytes 9 (L) 24 - 44 %    Monocytes 5 1 - 7 %    Eosinophils % 1 0 - 4 %    Basophils 0 0 - 2 %    Segs Absolute 6.30 1.3 - 9.1 k/uL    Absolute Lymph # 0.60 (L) 1.0 - 4.8 k/uL    Absolute Mono # 0.40 0.1 - 1.3 k/uL    Absolute Eos # 0.10 0.0 - 0.4 k/uL    Basophils Absolute 0.00 0.0 - 0.2 k/uL   Hepatic Function Panel    Collection Time: 03/15/22  6:09 AM   Result Value Ref Range    Albumin 1.6 (L) 3.5 - 5.2 g/dL    Alkaline Phosphatase 313 (H) 35 - 104 U/L     (H) 5 - 33 U/L    AST 56 (H) <32 U/L    Total Bilirubin 0.69 0.3 - 1.2 mg/dL    Bilirubin, Direct 0.37 (H) <0.31 mg/dL    Bilirubin, Indirect 0.32 0.00 - 1.00 mg/dL    Total Protein 3.9 (L) 6.4 - 8.3 g/dL   Lipase    Collection Time: 03/15/22  6:09 AM   Result Value Ref Range    Lipase 66 (H) 13 - 60 U/L   Basic Metabolic Panel    Collection Time: 03/15/22  6:09 AM   Result Value Ref Range    Glucose 144 (H) 70 - 99 mg/dL    BUN 9 6 - 20 mg/dL    CREATININE 0.66 0.50 - 0.90 mg/dL    Calcium 6.9 (L) 8.6 - 10.4 mg/dL    Sodium 132 (L) 135 - 144 mmol/L    Potassium 3.7 3.7 - 5.3 mmol/L    Chloride 105 98 - 107 mmol/L    CO2 20 20 - 31 mmol/L    Anion Gap 7 (L) 9 - 17 mmol/L    GFR Non-African American >60 >60 mL/min    GFR African American >60 >60 mL/min    GFR Comment         Magnesium    Collection Time: 03/15/22  6:09 AM   Result Value Ref Range    Magnesium 1.9 1.6 - 2.6 mg/dL   Phosphorus    Collection Time: 03/15/22  6:09 AM   Result Value Ref Range    Phosphorus 2.5 (L) 2.6 - 4.5 mg/dL   POC Glucose Fingerstick    Collection Time: 03/15/22  6:28 AM   Result Value Ref Range    POC Glucose 173 (H) 65 - 105 mg/dL   POC Glucose Fingerstick    Collection Time: 03/15/22  7:19 AM   Result Value Ref Range    POC Glucose 145 (H) 65 - 105 mg/dL   POC Glucose Fingerstick    Collection Time: 03/15/22  8:42 AM   Result Value Ref Range    POC Glucose 222 (H) 65 - 105 mg/dL   POC Glucose Fingerstick    Collection Time: 03/15/22  9:15 AM   Result Value Ref Range    POC Glucose 233 (H) 65 - 105 mg/dL   Basic Metabolic Panel    Collection Time: 03/15/22  9:56 AM   Result Value Ref Range Glucose 234 (H) 70 - 99 mg/dL    BUN 8 6 - 20 mg/dL    CREATININE 0.68 0.50 - 0.90 mg/dL    Calcium 7.1 (L) 8.6 - 10.4 mg/dL    Sodium 132 (L) 135 - 144 mmol/L    Potassium 3.7 3.7 - 5.3 mmol/L    Chloride 105 98 - 107 mmol/L    CO2 18 (L) 20 - 31 mmol/L    Anion Gap 9 9 - 17 mmol/L    GFR Non-African American >60 >60 mL/min    GFR African American >60 >60 mL/min    GFR Comment         Magnesium    Collection Time: 03/15/22  9:56 AM   Result Value Ref Range    Magnesium 1.8 1.6 - 2.6 mg/dL   Phosphorus    Collection Time: 03/15/22  9:56 AM   Result Value Ref Range    Phosphorus 2.2 (L) 2.6 - 4.5 mg/dL   Vancomycin Level, Random    Collection Time: 03/15/22  9:56 AM   Result Value Ref Range    Vancomycin Rm 22.7 ug/mL    Vancomycin Random Dose amount 1500 MG     Vancomycin Random Date last dose 3,152,022     Vancomycin Random Time last dose 530    POC Glucose Fingerstick    Collection Time: 03/15/22 10:16 AM   Result Value Ref Range    POC Glucose 184 (H) 65 - 105 mg/dL   POC Glucose Fingerstick    Collection Time: 03/15/22 11:26 AM   Result Value Ref Range    POC Glucose 168 (H) 65 - 105 mg/dL   Basic Metabolic Panel    Collection Time: 03/15/22  1:56 PM   Result Value Ref Range    Glucose 148 (H) 70 - 99 mg/dL    BUN 8 6 - 20 mg/dL    CREATININE 0.61 0.50 - 0.90 mg/dL    Calcium 7.2 (L) 8.6 - 10.4 mg/dL    Sodium 132 (L) 135 - 144 mmol/L    Potassium 3.6 (L) 3.7 - 5.3 mmol/L    Chloride 104 98 - 107 mmol/L    CO2 20 20 - 31 mmol/L    Anion Gap 8 (L) 9 - 17 mmol/L    GFR Non-African American >60 >60 mL/min    GFR African American >60 >60 mL/min    GFR Comment         Magnesium    Collection Time: 03/15/22  1:56 PM   Result Value Ref Range    Magnesium 1.8 1.6 - 2.6 mg/dL   Phosphorus    Collection Time: 03/15/22  1:56 PM   Result Value Ref Range    Phosphorus 2.4 (L) 2.6 - 4.5 mg/dL   POC Glucose Fingerstick    Collection Time: 03/15/22  2:07 PM   Result Value Ref Range    POC Glucose 155 (H) 65 - 105 mg/dL   POC Glucose Fingerstick    Collection Time: 03/15/22  4:48 PM   Result Value Ref Range    POC Glucose 207 (H) 65 - 105 mg/dL   Basic Metabolic Panel    Collection Time: 03/15/22  5:39 PM   Result Value Ref Range    Glucose 241 (H) 70 - 99 mg/dL    BUN 8 6 - 20 mg/dL    CREATININE 0.72 0.50 - 0.90 mg/dL    Calcium 7.1 (L) 8.6 - 10.4 mg/dL    Sodium 132 (L) 135 - 144 mmol/L    Potassium 4.0 3.7 - 5.3 mmol/L    Chloride 102 98 - 107 mmol/L    CO2 21 20 - 31 mmol/L    Anion Gap 9 9 - 17 mmol/L    GFR Non-African American >60 >60 mL/min    GFR African American >60 >60 mL/min    GFR Comment         Magnesium    Collection Time: 03/15/22  5:39 PM   Result Value Ref Range    Magnesium 1.8 1.6 - 2.6 mg/dL   Phosphorus    Collection Time: 03/15/22  5:39 PM   Result Value Ref Range    Phosphorus 2.9 2.6 - 4.5 mg/dL   POC Glucose Fingerstick    Collection Time: 03/15/22  8:38 PM   Result Value Ref Range    POC Glucose 166 (H) 65 - 105 mg/dL       Lab Results   Component Value Date/Time    SPECIAL NOT REPORTED 09/03/2019 07:52 PM     Lab Results   Component Value Date/Time    CULTURE KLEBSIELLA PNEUMONIAE >471770 CFU/ML (A) 03/13/2022 09:36 AM    CULTURE ENTEROCOCCUS FAECALIS >104601 CFU/ML (A) 03/13/2022 09:36 AM       Recent Labs     03/15/22  1126 03/15/22  1407 03/15/22  1648 03/15/22  2038   POCGLU 168* 155* 207* 166*       Radiology:    CT Head WO Contrast    Result Date: 3/12/2022  No acute intracranial abnormality. CT ABDOMEN PELVIS W IV CONTRAST Additional Contrast? Radiologist Recommendation    Result Date: 3/14/2022  1. Multifocal pneumonia, most pronounced in the left lower lobe. 2. Inflammatory changes surrounding the pancreas concerning for acute pancreatitis. No focal fluid collection. 3. Fatty liver. 4. Anasarca. 5. Presacral edema without a discrete abscess. 6. Inflammatory changes surround the bladder, concerning for underlying infection. 7. No bowel obstruction.      XR CHEST PORTABLE    Result Date:  Acute pancreatitis [K85.90] 03/14/2022    Multifocal pneumonia [J18.9] 38/24/0669    Alcoholic hepatitis [V76.12] 03/14/2022    GAIL (acute kidney injury) ([facility] [N17.9] 03/13/2022    DKA, type 2, not at goal [facility]) [E11.10] 03/12/2022    Alcohol intoxication ([facility] [F10.929] 12/16/2015       Plan:        Diabetic Ketoacidosis, 2/2 poor insulin compliance  · NPO, initiate DKA protocol  · BMP q4h, initial Mag and Phos levels, glucose checks every hour  · Insulin Regular started at 0.1 Units/kg/hr, continue due to sepsis  · On clear liquid diet, discontinued IV fluids  · Hypoglycemia, phos and potassium replacement protocols  Sepsis secondary to multifocal pneumonia of left lower lobe versus UTI:   · Afebrile, , WBC within normal limits   · On 2 L oxygen via nasal cannula  · CT abdomen/pelvis: Findings of multifocal pneumonia of left lower lobe  · Respiratory panel negative and procalcitonin elevated 0.47  · Urine cultures growing Klebsiella pneumonia and Enterococcus faecalis, C. difficile negative, blood cultures no growth  · Flagyl, vancomycin, cefepime day 3  · Infectious disease consulted  Acute pancreatitis: Inflammatory changes surrounding the pancreas on CT abdomen pelvis, lipase downtrending, continue fluids  Alcohol abuse: CIWA protocol, thiamine 50 mg IV  GAIL (improving): Creatinine 1.50 on admission, now 0.66; continue fluids  Alcoholic hepatitis: Elevated LFTs, downtrending; CT abdomen/pelvis showing fatty liver  Atrial fibrillation: Lopressor 5 mg IV as needed, cardiology consulted: Echo LVEF>55%, Cardizem infusion with plans to switch to oral soon  Dilutional anemia: No gross bleeding, hemoglobin 11.2, continue trending    Comorbid conditions:  Depression: Wellbutrin, fluoxetine  Neuropathy: Lamictal and Lyrica  Hyperlipidemia: Pravastatin  Hypertension: Lisinopril  History of SVT: Diltiazem     DVT prophylaxis: Lovenox 120 mg subcutaneous daily  GI prophylaxis:  Pepcid 20 mg IV twice daily  Disposition: Likely home  Code: Full Code   Diet: ADULT DIET; Full Liquid; 4 carb choices (60 gm/meal)   Consults: IP CONSULT TO INTERNAL MEDICINE  IP CONSULT TO PULMONOLOGY  IP CONSULT TO SOCIAL WORK  IP CONSULT TO SOCIAL WORK  PHARMACY TO DOSE VANCOMYCIN  IP CONSULT TO CARDIOLOGY  IP CONSULT TO INFECTIOUS DISEASES     Saint Goodwill, MD  3/15/2022  10:41 PM   Attending Physician Statement  I have discussed the care of Shazia Peguero and I have examined the patient myselft and taken ros and hpi , including pertinent history and exam findings,  with the resident. I have reviewed the key elements of all parts of the encounter with the resident. I agree with the assessment, plan and orders as documented by the resident.       Electronically signed by Saint Goodwill, MD

## 2022-03-16 NOTE — CONSULTS
Today's Date: 3/16/2022  Patient Name: Clara Almeida  Date of admission: 3/12/2022  7:42 AM  Patient's age: 42 y.o., 1980  Admission Dx: DKA, type 2, not at goal [facility]) [E11.10]    Reason for Consult: possible HIT  Requesting Physician: Vinod Ramirez MD    CHIEF COMPLAINT:    Chief Complaint   Patient presents with    Alcohol Intoxication       History Obtained From:  patient and chart    HISTORY OF PRESENT ILLNESS:      Clara Almeida is a 42 y.o. female with a history of antiphospholipid antibody syndrome, chronically maintained on Arixtra, history of obesity and status post gastric bypass in 2013, who is admitted to the hospital on 3/12/2022  for alcohol intoxication. Patient reported to her heavy alcohol intake prior to her admission and brought in by her  with altered mental status and admitted for ICU management of her DKA. Patient has history of past medical history of anxiety, bipolar disorder, history of PE, hypertension, anemia and diabetes and suicidal ideation. Patient reportedly has not been compliant with her medications. On admission her ethanol level noted to be 404 and noted to have elevated liver enzymes. Her checks x-ray showed diffuse interstitial opacities and urine culture growing Klebsiella pneumoniae and Enterococcus faecalis. Patient has been evaluated by ID and currently receiving antibiotics. Her COVID-19 was negative. On admission her platelet count noted to be 278, hemoglobin 15.5 and WBC 8.1. Yesterday dropped to 104, 265 and today, the platelet count is down to 34K  Her coags are normal.  Patient did not receive any IV heparin but did receive Lovenox 30 mg twice daily since admission. Hematology consulted for evaluation of her thrombocytopenia and questionable HIT. Patient reports she has been following with hematologist in the past for her history of antiphospholipid antibody syndrome and hypercoagulable state.   She has been maintained on Arixtra chronically. She does not recall history of HIT or heparin allergy in the past.    Past Medical History:   has a past medical history of Alcohol abuse, Anxiety, Arthritis, Painting esophagus, Benzodiazepine overdose, Bipolar disorder, unspecified ([address] Bipolar I disorder, most recent episode depressed, severe without psychotic features ([address] Cellulitis, Chronic abdominal wound infection, Constipation, Depression, Drug overdose, intentional ([address] Genital herpes, unspecified, GERD (gastroesophageal reflux disease), History of pulmonary embolism, Hx of blood clots, Hypertension, Iron deficiency anemia, Isopropyl alcohol poisoning, Lumbosacral spondylosis without myelopathy, MDRO (multiple drug resistant organisms) resistance, Miscarriage, Morbid obesity ([address] MRSA (methicillin resistant staph aureus) culture positive, Overdose of benzodiazepine, Pernicious anemia, Primary hypercoagulable state ([address] Pulmonary embolism ([address] Suicidal behavior, Suicidal ideation, Suicide attempt ([address] SVT (supraventricular tachycardia) ([address] Toxic effect of ethanol, intentional self-harm ([address] and Type 2 diabetes mellitus, with long-term current use of insulin ([address] Past Surgical History:   has a past surgical history that includes Cholecystectomy; Liver Resection; hernia repair; Tonsillectomy; Endoscopy, colon, diagnostic; Abdominal hernia repair (2014); Abdominal hernia repair (11/3/14); Bariatric Surgery (2013); Dilation and curettage of uterus (2005); Finger amputation (Left, 02/09/2015); lymph node biopsy (1990); yariel and bso (cervix removed) (2011); and IVC filter insertion. Medications:    Prior to Admission medications    Medication Sig Start Date End Date Taking?  Authorizing Provider   ondansetron (ZOFRAN ODT) 4 MG disintegrating tablet Take 1 tablet by mouth every 8 hours as needed for Nausea 2/1/22   Bret Menon DO   benazepril (LOTENSIN) 20 MG tablet TAKE 1 TABLET BY MOUTH DAILY 7/21/21   Sandy MD Pebbles   famotidine (PEPCID) 20 MG tablet TAKE 1 TABLET BY MOUTH TWICE DAILY 4/22/21   Sandy Rogel MD   TOUJEO SOLOSTAR 300 UNIT/ML SOPN INJECT 70 UNITS INTO THE SKIN EVERY MORNING 3/30/21   Ernesto Myers MD   acyclovir (ZOVIRAX) 5 % ointment Apply topically every 3 hours x 10 days. 3/30/21   Ernesto Myers MD   fondaparinux (ARIXTRA) 10 MG/0.8ML SOLN injection Inject 0.8 mLs into the skin daily 3/30/21   Ernesto Myers MD   FLUoxetine (PROZAC) 20 MG capsule Take 1 capsule by mouth daily    Historical Provider, MD   cyanocobalamin 1000 MCG/ML injection Inject 1 mL into the muscle every 7 days 2/13/21   Ernesto Myers MD   Syringe/Needle, Disp, (SYRINGE 3CC/25GX1\") 25G X 1\" 3 ML MISC To be used with B12 injections monthly 2/13/21   Ernesto Myers MD   tiZANidine (ZANAFLEX) 4 MG tablet TAKE 1 TABLET BY MOUTH TWICE DAILY AS NEEDED FOR BACK PAIN 12/27/20   Ernesto Myers MD   FARXIGA 10 MG tablet TAKE 1 TABLET BY MOUTH EVERY MORNING 11/2/20   Ernesto Myers MD   pregabalin (LYRICA) 50 MG capsule Take 1 capsule by mouth 3 times daily for 7 days. 10/6/20 10/13/20  Sandy Rogel MD   sucralfate (CARAFATE) 1 GM tablet DISSOLVE 1 TABLET INTO 15 ML( 1 TABLESPOON) OF WATER AND DRINK BY MOUTH FOUR TIMES DAILY AS NEEDED FOR GERD 7/27/20   Ernesto Myers MD   dilTIAZem (DILTIAZEM CD) 240 MG extended release capsule Take 240 mg by mouth daily Take one capsule by mouth daily.     Historical Provider, MD   pravastatin (PRAVACHOL) 40 MG tablet TAKE 1 TABLET(40 MG) BY MOUTH DAILY 6/26/20   Ernesto Myers MD   valACYclovir (VALTREX) 500 MG tablet Take 1 tablet by mouth daily 6/8/20   Ernesto Myers MD   ondansetron (ZOFRAN) 4 MG tablet Take 1 tablet by mouth 3 times daily as needed for Nausea or Vomiting 4/27/20   Eli Villegas MD   acetaminophen (TYLENOL) 500 MG tablet Take 2 tablets by mouth every 8 hours as needed for Pain 2/19/20   Christopher Russo 1721, DO LORazepam (ATIVAN) 2 MG tablet  12/18/19   Historical Provider, MD   GLUCAGEN HYPOKIT 1 MG SOLR injection  12/19/19   Historical Provider, MD   glucose monitoring kit (FREESTYLE) monitoring kit Testing twice a day. Please dispense according to patients insurance. 12/20/19   Lalo Walker MD   Insulin Pen Needle 31G X 5 MM MISC 1 each by Does not apply route daily 12/20/19   Lalo Walker MD   Lancets 30G MISC Testing twice a day. Please dispense according to patients insurance. 12/20/19   Lalo Walker MD   blood glucose monitor strips Testing twice a day. Please dispense according to patients insurance. 12/20/19   Lalo Walker MD   KLOR-CON M10 10 MEQ extended release tablet Take 2 tablets by mouth daily as needed (take with bumex) 12/20/19   Lalo Walker MD   bumetanide (BUMEX) 0.5 MG tablet TAKE 1 TABLET BY MOUTH DAILY AS NEEDED FOR LEG SWELLING 12/20/19   Lalo Walker MD   lamoTRIgine (LAMICTAL) 200 MG tablet Take 200 mg by mouth daily    Historical Provider, MD   amphetamine-dextroamphetamine (ADDERALL, 30MG,) 30 MG tablet Take 30 mg by mouth daily.     Historical Provider, MD   dicyclomine (BENTYL) 10 MG capsule Take 1 capsule by mouth every 6 hours as needed (cramps) 11/3/19   Rachel Knox MD   QUEtiapine (SEROQUEL) 100 MG tablet Take 50 mg by mouth nightly as needed     Historical Provider, MD   Multiple Vitamins-Minerals (THERAPEUTIC MULTIVITAMIN-MINERALS) tablet Take 1 tablet by mouth daily    Historical Provider, MD   buPROPion (WELLBUTRIN XL) 300 MG extended release tablet Take 300 mg by mouth every morning    Historical Provider, MD   vitamin D (CHOLECALCIFEROL) 1000 UNIT TABS tablet Take 10,000 Units by mouth daily 5 days a week    Historical Provider, MD     Current Facility-Administered Medications   Medication Dose Route Frequency Provider Last Rate Last Admin    insulin glargine (LANTUS) injection vial 50 Units  50 Units SubCUTAneous Daily Erika Al MD   50 Units at 03/16/22 0833    pravastatin (PRAVACHOL) tablet 40 mg  40 mg Oral Nightly Justice Santana MD        dilTIAZem (CARDIZEM) tablet 30 mg  30 mg Oral 3 times per day Alexia Orozco MD Max   30 mg at 03/16/22 1428    chlordiazePOXIDE (LIBRIUM) capsule 25 mg  25 mg Oral TID Anca King MD   25 mg at 03/16/22 1429    [START ON 3/17/2022] pantoprazole (PROTONIX) 40 mg in sodium chloride (PF) 10 mL injection  40 mg IntraVENous Daily Zoey Dow MD        HYDROcodone-acetaminophen [facility]) 5-325 MG per tablet 1 tablet  1 tablet Oral Q6H PRN BERNARDA Serrano - CNP   1 tablet at 03/16/22 1428    levoFLOXacin (LEVAQUIN) 750 MG/150ML infusion 750 mg  750 mg IntraVENous Q24H Lalit Travis MD   Stopped at 03/15/22 1916    insulin lispro (HUMALOG) injection vial 0-18 Units  0-18 Units SubCUTAneous TID WC Geni Schaffer MD   9 Units at 03/16/22 1227    insulin lispro (HUMALOG) injection vial 0-9 Units  0-9 Units SubCUTAneous Nightly Geni Schaffer MD   2 Units at 03/15/22 2044    perflutren lipid microspheres (DEFINITY) injection 1.65 mg  1.5 mL IntraVENous ONCE PRN Gaye Cody MD        miconazole (MICOTIN) 2 % powder   Topical BID Geni Schaffer MD   Given at 03/16/22 0831    metoprolol (LOPRESSOR) injection 5 mg  5 mg IntraVENous Q6H PRN Geni Schaffer MD   5 mg at 03/14/22 1630    lamoTRIgine (LAMICTAL) tablet 200 mg  200 mg Oral Daily Anca King MD   200 mg at 03/16/22 0831    buPROPion (WELLBUTRIN XL) extended release tablet 300 mg  300 mg Oral QAM Anca King MD   300 mg at 03/16/22 0829    FLUoxetine (PROZAC) capsule 20 mg  20 mg Oral Daily Anca King MD   20 mg at 03/16/22 0830    [Held by provider] lisinopril (PRINIVIL;ZESTRIL) tablet 20 mg  20 mg Oral Daily Anca King MD   20 mg at 03/13/22 1715    sodium chloride flush 0.9 % injection 10 mL  10 mL IntraVENous PRN Geni Schaffer MD   10 mL at 03/14/22 0810    glucose (GLUTOSE) 40 % oral gel 15 g  15 g Oral PRN Dileep Whitmore DO  dextrose 50 % IV solution  12.5 g IntraVENous PRN Lesta Proper [place], DO        glucagon (rDNA) injection 1 mg  1 mg IntraMUSCular PRN Lesta Proper [place], DO        dextrose 5 % solution  100 mL/hr IntraVENous PRN Lesta Proper [place], DO        potassium chloride 10 mEq/100 mL IVPB (Peripheral Line)  10 mEq IntraVENous PRN Aurea Kim MD   Stopped at 03/15/22 0635    magnesium sulfate 1000 mg in dextrose 5% 100 mL IVPB  1,000 mg IntraVENous PRN Alexis Reyes MD   Stopped at 03/14/22 1559    sodium phosphate 10 mmol in dextrose 5 % 250 mL IVPB  10 mmol IntraVENous PRN Aurea Kim MD   Stopped at 03/16/22 1415    Or    sodium phosphate 15 mmol in dextrose 5 % 250 mL IVPB  15 mmol IntraVENous PRN Aurea Kim MD   Stopped at 03/15/22 1709    Or    sodium phosphate 20 mmol in dextrose 5 % 500 mL IVPB  20 mmol IntraVENous PRN Aurea Kim MD   Stopped at 03/13/22 1330    polyethylene glycol (GLYCOLAX) packet 17 g  17 g Oral Daily PRN Aurea Kim MD        sodium chloride flush 0.9 % injection 5-40 mL  5-40 mL IntraVENous 2 times per day Aurea Kim MD   10 mL at 03/16/22 0832    sodium chloride flush 0.9 % injection 5-40 mL  5-40 mL IntraVENous PRN Aurea Kim MD        0.9 % sodium chloride infusion  25 mL IntraVENous PRN Aurea Kim MD        ondansetron (ZOFRAN-ODT) disintegrating tablet 4 mg  4 mg Oral Q8H PRN Aurea Kim MD        Or    ondansetron (ZOFRAN) injection 4 mg  4 mg IntraVENous Q6H PRN Aurea Kim MD   4 mg at 03/13/22 1801    acetaminophen (TYLENOL) tablet 650 mg  650 mg Oral Q6H PRN Aurea Kim MD   650 mg at 03/16/22 1435    Or    acetaminophen (TYLENOL) suppository 650 mg  650 mg Rectal Q6H PRN Aurea Kim MD   650 mg at 03/14/22 0449    sodium chloride flush 0.9 % injection 5-40 mL  5-40 mL IntraVENous 2 times per day Gallito Cisneros MD   10 mL at 03/16/22 0830       Allergies:  Asa [aspirin], Betadine [povidone iodine], Celexa [citalopram hydrobromide], Citalopram, Lasix [furosemide], Macrobid [nitrofurantoin], Norco [hydrocodone-acetaminophen], Betadine [povidone iodine], Codeine, Lithium, Paroxetine, Paxil [paroxetine hcl], Tape [adhesive tape], and Tegretol [carbamazepine]    Social History:   reports that she has never smoked. She has never used smokeless tobacco. She reports that she does not drink alcohol and does not use drugs. Family History: family history includes Breast Cancer in her maternal aunt and maternal cousin; Cancer in her maternal uncle; Depression in her mother; Diabetes in her father; Heart Disease in her father; High Blood Pressure in her father and mother; High Cholesterol in her father and mother; Kidney Disease in her father. REVIEW OF SYSTEMS:    Constitutional: No fever or chills. No night sweats, no weight loss   Eyes: No eye discharge, double vision, or eye pain   HEENT: negative for sore mouth, sore throat, hoarseness and voice change   Respiratory: negative for cough , sputum, dyspnea, wheezing, hemoptysis, chest pain   Cardiovascular: negative for chest pain, dyspnea, palpitations, orthopnea, PND   Gastrointestinal: negative for nausea, vomiting, diarrhea, constipation, abdominal pain, Dysphagia, hematemesis and hematochezia   Genitourinary: negative for frequency, dysuria, nocturia, urinary incontinence, and hematuria   Integument: negative for rash, skin lesions, bruises.    Hematologic/Lymphatic: negative for easy bruising, bleeding, lymphadenopathy, or petechiae   Endocrine: negative for heat or cold intolerance,weight changes, change in bowel habits and hair loss   Musculoskeletal: negative for myalgias, arthralgias, pain, joint swelling,and bone pain   Neurological: negative for headaches, dizziness, seizures, weakness, numbness    PHYSICAL EXAM:      /71   Pulse 113   Temp 99.4 °F (37.4 °C) (Axillary)   Resp 28   Ht 5' 8\" (1.727 m)   Wt 280 lb (127 kg)   SpO2 93%   BMI 42.57 kg/m²    Temp (24hrs), Av.3 °F (37.4 °C), Min:98.5 °F (36.9 °C), Max:100 °F (37.8 °C)    General appearance - well appearing, no in pain or distress   Mental status - alert and cooperative   Eyes - pupils equal and reactive, extraocular eye movements intact   Ears - bilateral TM's and external ear canals normal   Mouth - mucous membranes moist, pharynx normal without lesions   Neck - supple, no significant adenopathy   Lymphatics - no palpable lymphadenopathy, no hepatosplenomegaly   Chest - clear to auscultation, no wheezes, rales or rhonchi, symmetric air entry   Heart - normal rate, regular rhythm, normal S1, S2, no murmurs  Abdomen - soft, nontender, nondistended, no masses or organomegaly   Neurological - alert, oriented, normal speech, no focal findings or movement disorder noted   Musculoskeletal - no joint tenderness, deformity or swelling   Extremities - peripheral pulses normal, no pedal edema, no clubbing or cyanosis   Skin - normal coloration and turgor, no rashes, no suspicious skin lesions noted ,    DATA:    Labs:   CBC:   Recent Labs     03/15/22  0609 03/16/22  0356   WBC 7.4 5.1   HGB 11.2* 11.1*   HCT 33.5* 33.4*   PLT 65* 34*     BMP:   Recent Labs     03/15/22  1739 03/16/22  0356   * 132*   K 4.0 3.9   CO2 21 21   BUN 8 7   CREATININE 0.72 0.68   LABGLOM >60 >60   GLUCOSE 241* 239*     PT/INR:   Recent Labs     03/13/22  1851   PROTIME 14.8*   INR 1.2       IMAGING DATA:  XR CHEST (2 VW)   Final Result   Increasing infiltration in the left lung base, in keeping with pneumonia. RECOMMENDATION:         CT ABDOMEN PELVIS W IV CONTRAST Additional Contrast? Radiologist Recommendation   Final Result   1. Multifocal pneumonia, most pronounced in the left lower lobe. 2. Inflammatory changes surrounding the pancreas concerning for acute   pancreatitis. No focal fluid collection. 3. Fatty liver. 4. Anasarca. 5. Presacral edema without a discrete abscess.    6. Inflammatory changes surround the bladder, concerning for underlying   infection. 7. No bowel obstruction. XR CHEST PORTABLE   Final Result   Intervally placed right upper extremity PICC distal tip projects over the   right atrium. No discrete pneumothorax. Left basilar opacities persist and appear mildly worsened, concerning for   developing infection. Aspiration may have a similar appearance. Low lung volumes. CT Head WO Contrast   Final Result   No acute intracranial abnormality. XR CHEST PORTABLE   Final Result   Low lung volumes. Diffuse interstitial opacities in the perihilar areas with   left perihilar and lower lobe alveolar type opacities consistent with   multifocal airspace disease. Primary Problem  Multifocal pneumonia    Active Hospital Problems    Diagnosis Date Noted    Acute pancreatitis [K85.90] 03/14/2022    Multifocal pneumonia [J18.9] 14/33/0234    Alcoholic hepatitis [U65.95] 03/14/2022    GAIL (acute kidney injury) ([address] [N17.9] 03/13/2022    DKA, type 2, not at goal [facility]) [E11.10] 03/12/2022    Alcohol intoxication ([address] [F10.929] 12/16/2015       IMPRESSION:   1. Altered mental status   2. alcohol intoxication  3. Multifocal pneumonia  4. Diabetic ketoacidosis  5. Urinary tract infection  6. Thrombocytopenia, overall given clinical picture, timeline suspicion for HIT is very low. 7. History of antiphospholipid antibody syndrome: Patient has been on chronic anticoagulation with Arixtra and therefore is a hypercoagulable state and would recommend resuming anticoagulation soon with Arixtra  8. Acute pancreatitis  9. History of gastric bypass      RECOMMENDATIONS:  1. I reviewed [facility], imaging studies, discussed possible diagnosis and treatment recommendations  2. Check peripheral blood smear, LDH, haptoglobin, B12 and iron studies  3. Check fibrinogen level although suspicion for DIC is very low.   Nothing acute bone marrow suppression from her infection or possible alcohol is more likely the cause  4. Clinical picture not consistent with HIT however will get HIT antibody  5. Given her history of antiphospholipid antibody syndrome, recommend starting her on anticoagulation as soon as possible with Arixtra, if her platelets are slightly better tomorrow   6. Avoid any heparin use until then. 7. Monitor counts closely  8. We will follow    Discussed with patient and Nurse. Thank you for asking us to see this patient. Jason Santacruz MD  Hematologist/Medical Oncologist    Cell: [phone]    This note is created with the assistance of a speech recognition program.  While intending to generate a document that actually reflects the content of the visit, the document can still have some errors including those of syntax and sound a like substitutions which may escape proof reading. It such instances, actual meaning can be extrapolated by contextual diversion.

## 2022-03-16 NOTE — PROGRESS NOTES
[facility]    PROGRESS NOTE             3/16/2022    8:38 AM    Name:   Sherine Wilcox  MRN:     [id number]     Acct:      [de-identified]   Room:   2009/2009-01   Day:  4  Admit Date:  3/12/2022  7:42 AM    PCP:  Claus Chilel  Code Status:  Full Code    Subjective:     C/C:   Chief Complaint   Patient presents with    Alcohol Intoxication     Interval History Status: improved. No acute events overnight. Patient seen at bedside this morning. Patient tolerating full liquid diet with no nausea or vomiting. Patient denies any abdominal pain, chest pain, or shortness of breath at this time. She agrees to the plan of care. Brief History:     Patient is a 42-year-old female with medical history of anxiety, bipolar disorder, history of pulmonary embolism, hypertension, iron deficiency anemia, suicidal ideation, type 2 diabetes mellitus presents with chief complaint of alcohol intoxication. Patient reports that she has not been taking insulin for the last few days as she \"forgot\". Patient was quite somnolent at time of exam.  She did not report any significant pain and significant alcohol use last night.     In the ED, patient was found to be tachycardic and hypoxic. Patient was placed on 2 L of oxygen via nasal cannula. Patient's VBG's were remarkable for pH of 7.187. Other remarkable labs include sodium of 111, creatinine of 1.50, lactic acid of 10.2, glucose of 1255, alkaline phosphatase of 411, ALT of 504, AST of 2478, ethanol level of 404. Remarkable studies include:  · Chest x-ray showing diffuse interstitial opacities in the perihilar areas consistent with multi focal airspace disease  · CT head without contrast showing no acute intracranial abnormality     Patient was treated in the ED with fluids and insulin continuous infusion.   Patient was placed on DKA protocol and decision was made to admit the patient to the ICU with consults in place for pulmonology critical care. Review of Systems:     Review of Systems   Constitutional: Negative for appetite change, chills, fatigue and fever. HENT: Negative for congestion and sore throat. Eyes: Negative for redness. Respiratory: Negative for chest tightness, shortness of breath and wheezing. Cardiovascular: Negative for chest pain, palpitations and leg swelling. Gastrointestinal: Negative for abdominal pain, nausea and vomiting. Genitourinary: Negative for dysuria and hematuria. Musculoskeletal: Negative for back pain. Skin: Negative for color change. Neurological: Negative for dizziness, weakness, light-headedness and headaches. Psychiatric/Behavioral: Negative for confusion and decreased concentration. Medications:      Allergies:  Asa [aspirin], Betadine [povidone iodine], Celexa [citalopram hydrobromide], Citalopram, Lasix [furosemide], Macrobid [nitrofurantoin], Norco [hydrocodone-acetaminophen], Betadine [povidone iodine], Codeine, Lithium, Paroxetine, Paxil [paroxetine hcl], Tape [adhesive tape], and Tegretol [carbamazepine]    Current Meds:   Scheduled Meds:    insulin glargine  50 Units SubCUTAneous Daily    pravastatin  40 mg Oral Nightly    dilTIAZem  30 mg Oral 3 times per day    enoxaparin  30 mg SubCUTAneous Daily    chlordiazePOXIDE  25 mg Oral TID    levofloxacin  750 mg IntraVENous Q24H    insulin lispro  0-18 Units SubCUTAneous TID WC    insulin lispro  0-9 Units SubCUTAneous Nightly    miconazole   Topical BID    lamoTRIgine  200 mg Oral Daily    buPROPion  300 mg Oral QAM    FLUoxetine  20 mg Oral Daily    [Held by provider] lisinopril  20 mg Oral Daily    sodium chloride flush  5-40 mL IntraVENous 2 times per day    famotidine (PEPCID) injection  20 mg IntraVENous BID    sodium chloride flush  5-40 mL IntraVENous 2 times per day     Continuous Infusions:    dextrose      sodium chloride       PRN Meds: HYDROcodone 5 mg - acetaminophen, perflutren lipid microspheres, metoprolol, sodium chloride flush, glucose, dextrose, glucagon (rDNA), dextrose, potassium chloride, magnesium sulfate, sodium phosphate IVPB **OR** sodium phosphate IVPB **OR** sodium phosphate IVPB, polyethylene glycol, sodium chloride flush, sodium chloride, ondansetron **OR** ondansetron, acetaminophen **OR** acetaminophen    Data:     Past Medical History:    Past Medical History:   Diagnosis Date    Alcohol abuse     sober since5    Anxiety     Arthritis     Painting esophagus     Benzodiazepine overdose 9/26/2015    Bipolar disorder, unspecified ([address]     Bipolar I disorder, most recent episode depressed, severe without psychotic features ([address]     Cellulitis 11/17/2018    Chronic abdominal wound infection 9/27/2015    Constipation 9/3/2019    Depression 7/12/2015    Drug overdose, intentional ([address] 7/12/2015    Genital herpes, unspecified     GERD (gastroesophageal reflux disease)     History of pulmonary embolism     Hx of blood clots dx 2 years ago    clots in both legs and lungs     Hypertension     Iron deficiency anemia     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    Lumbosacral spondylosis without myelopathy     MDRO (multiple drug resistant organisms) resistance 2010    MRSA (abd)    Miscarriage     multiple, around 7th mos pregnant each time d/t hypercoagulation    Morbid obesity ([address]     MRSA (methicillin resistant staph aureus) culture positive 02/10/2017    urine    Overdose of benzodiazepine     Pernicious anemia     Primary hypercoagulable state ([address]     antiphospholipid antibodies on Arixtra shots daily    Pulmonary embolism ([address] 2010    Suicidal behavior 12/14/2015    Suicidal ideation     Suicide attempt ([address] 2014    hx OD on painkillers and rubbing alcohol    SVT (supraventricular tachycardia) ([address] 04/07/2017    Toxic effect of ethanol, intentional self-harm ([address] 12/16/2015    Type 2 diabetes mellitus, with long-term current use of insulin ([address]        Social History:    Tobacco:   Patient  reports that she has never smoked. She has never used smokeless tobacco.  Alcohol:     Patient  reports no history of alcohol use. Drug Use: Patient  reports no history of drug use. Family History:   Family History   Problem Relation Age of Onset    Depression Mother     High Blood Pressure Mother     High Cholesterol Mother     Diabetes Father     Heart Disease Father     Kidney Disease Father     High Blood Pressure Father     High Cholesterol Father     Cancer Maternal Uncle         of duodenum had whipple    Breast Cancer Maternal Aunt     Breast Cancer Maternal Cousin        Vitals:  /77   Pulse 115   Temp 99.4 °F (37.4 °C) (Axillary)   Resp 29   Ht 5' 8\" (1.727 m)   Wt 280 lb (127 kg)   SpO2 91%   BMI 42.57 kg/m²   Temp (24hrs), Av.2 °F (37.3 °C), Min:98.5 °F (36.9 °C), Max:100 °F (37.8 °C)    Vitals:    22 0645 22 0700 22 0715 22 0800   BP:  (!) 92/56  121/77   Pulse: 109 107 108 115   Resp: 23 23 24 29   Temp:    99.4 °F (37.4 °C)   TempSrc:    Axillary   SpO2: 92% 92% 92% 91%   Weight:       Height:           O2 Requirements: 2 L oxygen via nasal cannula    Lines/Drains/Access: Peripheral IV, PICC double-lumen, urethral catheter    I/O(24Hr):     Intake/Output Summary (Last 24 hours) at 3/16/2022 0838  Last data filed at 3/16/2022 0538  Gross per 24 hour   Intake 3072.41 ml   Output 6150 ml   Net -3077.59 ml       Labs:  Recent Results (from the past 24 hour(s))   POC Glucose Fingerstick    Collection Time: 03/15/22  8:42 AM   Result Value Ref Range    POC Glucose 222 (H) 65 - 105 mg/dL   POC Glucose Fingerstick    Collection Time: 03/15/22  9:15 AM   Result Value Ref Range    POC Glucose 233 (H) 65 - 105 mg/dL   Basic Metabolic Panel    Collection Time: 03/15/22  9:56 AM   Result Value Ref Range    Glucose 234 (H) 70 - 99 mg/dL    BUN 8 6 - 20 mg/dL    CREATININE 0. 68 0.50 - 0.90 mg/dL    Calcium 7.1 (L) 8.6 - 10.4 mg/dL    Sodium 132 (L) 135 - 144 mmol/L    Potassium 3.7 3.7 - 5.3 mmol/L    Chloride 105 98 - 107 mmol/L    CO2 18 (L) 20 - 31 mmol/L    Anion Gap 9 9 - 17 mmol/L    GFR Non-African American >60 >60 mL/min    GFR African American >60 >60 mL/min    GFR Comment         Magnesium    Collection Time: 03/15/22  9:56 AM   Result Value Ref Range    Magnesium 1.8 1.6 - 2.6 mg/dL   Phosphorus    Collection Time: 03/15/22  9:56 AM   Result Value Ref Range    Phosphorus 2.2 (L) 2.6 - 4.5 mg/dL   Vancomycin Level, Random    Collection Time: 03/15/22  9:56 AM   Result Value Ref Range    Vancomycin Rm 22.7 ug/mL    Vancomycin Random Dose amount 1500 MG     Vancomycin Random Date last dose 3,152,022     Vancomycin Random Time last dose 530    POC Glucose Fingerstick    Collection Time: 03/15/22 10:16 AM   Result Value Ref Range    POC Glucose 184 (H) 65 - 105 mg/dL   POC Glucose Fingerstick    Collection Time: 03/15/22 11:26 AM   Result Value Ref Range    POC Glucose 168 (H) 65 - 105 mg/dL   Basic Metabolic Panel    Collection Time: 03/15/22  1:56 PM   Result Value Ref Range    Glucose 148 (H) 70 - 99 mg/dL    BUN 8 6 - 20 mg/dL    CREATININE 0.61 0.50 - 0.90 mg/dL    Calcium 7.2 (L) 8.6 - 10.4 mg/dL    Sodium 132 (L) 135 - 144 mmol/L    Potassium 3.6 (L) 3.7 - 5.3 mmol/L    Chloride 104 98 - 107 mmol/L    CO2 20 20 - 31 mmol/L    Anion Gap 8 (L) 9 - 17 mmol/L    GFR Non-African American >60 >60 mL/min    GFR African American >60 >60 mL/min    GFR Comment         Magnesium    Collection Time: 03/15/22  1:56 PM   Result Value Ref Range    Magnesium 1.8 1.6 - 2.6 mg/dL   Phosphorus    Collection Time: 03/15/22  1:56 PM   Result Value Ref Range    Phosphorus 2.4 (L) 2.6 - 4.5 mg/dL   POC Glucose Fingerstick    Collection Time: 03/15/22  2:07 PM   Result Value Ref Range    POC Glucose 155 (H) 65 - 105 mg/dL   POC Glucose Fingerstick    Collection Time: 03/15/22  4:48 PM   Result Value Ref Range    POC Glucose 207 (H) 65 - 105 mg/dL   Basic Metabolic Panel    Collection Time: 03/15/22  5:39 PM   Result Value Ref Range    Glucose 241 (H) 70 - 99 mg/dL    BUN 8 6 - 20 mg/dL    CREATININE 0.72 0.50 - 0.90 mg/dL    Calcium 7.1 (L) 8.6 - 10.4 mg/dL    Sodium 132 (L) 135 - 144 mmol/L    Potassium 4.0 3.7 - 5.3 mmol/L    Chloride 102 98 - 107 mmol/L    CO2 21 20 - 31 mmol/L    Anion Gap 9 9 - 17 mmol/L    GFR Non-African American >60 >60 mL/min    GFR African American >60 >60 mL/min    GFR Comment         Magnesium    Collection Time: 03/15/22  5:39 PM   Result Value Ref Range    Magnesium 1.8 1.6 - 2.6 mg/dL   Phosphorus    Collection Time: 03/15/22  5:39 PM   Result Value Ref Range    Phosphorus 2.9 2.6 - 4.5 mg/dL   POC Glucose Fingerstick    Collection Time: 03/15/22  8:38 PM   Result Value Ref Range    POC Glucose 166 (H) 65 - 105 mg/dL   CBC with Auto Differential    Collection Time: 03/16/22  3:56 AM   Result Value Ref Range    WBC 5.1 3.5 - 11.0 k/uL    RBC 3.68 (L) 4.0 - 5.2 m/uL    Hemoglobin 11.1 (L) 12.0 - 16.0 g/dL    Hematocrit 33.4 (L) 36 - 46 %    MCV 90.6 80 - 100 fL    MCH 30.1 26 - 34 pg    MCHC 33.2 31 - 37 g/dL    RDW 14.4 11.5 - 14.9 %    Platelets 34 (L) 921 - 450 k/uL    MPV 11.4 6.0 - 12.0 fL    Seg Neutrophils 75 (H) 36 - 66 %    Lymphocytes 8 (L) 24 - 44 %    Monocytes 16 (H) 1 - 7 %    Eosinophils % 1 0 - 4 %    Basophils 0 0 - 2 %    Segs Absolute 3.82 1.3 - 9.1 k/uL    Absolute Lymph # 0.41 (L) 1.0 - 4.8 k/uL    Absolute Mono # 0.82 0.1 - 1.3 k/uL    Absolute Eos # 0.05 0.0 - 0.4 k/uL    Basophils Absolute 0.00 0.0 - 0.2 k/uL    Morphology ANISOCYTOSIS PRESENT     Morphology HYPOCHROMIA PRESENT    Basic Metabolic Panel    Collection Time: 03/16/22  3:56 AM   Result Value Ref Range    Glucose 239 (H) 70 - 99 mg/dL    BUN 7 6 - 20 mg/dL    CREATININE 0.68 0.50 - 0.90 mg/dL    Calcium 7.1 (L) 8.6 - 10.4 mg/dL    Sodium 132 (L) 135 - 144 mmol/L    Potassium 3.9 3.7 - 5.3 mmol/L    Chloride 101 98 - 107 mmol/L    CO2 21 20 - 31 mmol/L    Anion Gap 10 9 - 17 mmol/L    GFR Non-African American >60 >60 mL/min    GFR African American >60 >60 mL/min    GFR Comment         Magnesium    Collection Time: 03/16/22  3:56 AM   Result Value Ref Range    Magnesium 1.8 1.6 - 2.6 mg/dL   Phosphorus    Collection Time: 03/16/22  3:56 AM   Result Value Ref Range    Phosphorus 2.4 (L) 2.6 - 4.5 mg/dL   POC Glucose Fingerstick    Collection Time: 03/16/22  8:26 AM   Result Value Ref Range    POC Glucose 259 (H) 65 - 105 mg/dL       Lab Results   Component Value Date/Time    SPECIAL NOT REPORTED 09/03/2019 07:52 PM     Lab Results   Component Value Date/Time    CULTURE KLEBSIELLA PNEUMONIAE >900776 CFU/ML (A) 03/13/2022 09:36 AM    CULTURE ENTEROCOCCUS FAECALIS >381779 CFU/ML (A) 03/13/2022 09:36 AM       Recent Labs     03/15/22  1407 03/15/22  1648 03/15/22  2038 03/16/22  0826   POCGLU 155* 207* 166* 259*       Radiology:    CT Head WO Contrast    Result Date: 3/12/2022  No acute intracranial abnormality. CT ABDOMEN PELVIS W IV CONTRAST Additional Contrast? Radiologist Recommendation    Result Date: 3/14/2022  1. Multifocal pneumonia, most pronounced in the left lower lobe. 2. Inflammatory changes surrounding the pancreas concerning for acute pancreatitis. No focal fluid collection. 3. Fatty liver. 4. Anasarca. 5. Presacral edema without a discrete abscess. 6. Inflammatory changes surround the bladder, concerning for underlying infection. 7. No bowel obstruction. XR CHEST PORTABLE    Result Date: 3/13/2022  Intervally placed right upper extremity PICC distal tip projects over the right atrium. No discrete pneumothorax. Left basilar opacities persist and appear mildly worsened, concerning for developing infection. Aspiration may have a similar appearance. Low lung volumes. XR CHEST PORTABLE    Result Date: 3/12/2022  Low lung volumes.   Diffuse interstitial opacities in the perihilar areas with left perihilar and lower lobe alveolar type opacities consistent with multifocal airspace disease. Physical Examination:        Physical Exam  Constitutional:       General: She is not in acute distress. Appearance: Normal appearance. She is obese. HENT:      Head: Normocephalic and atraumatic. Right Ear: External ear normal.      Left Ear: External ear normal.      Nose: Nose normal.   Eyes:      Extraocular Movements: Extraocular movements intact. Cardiovascular:      Rate and Rhythm: Regular rhythm. Tachycardia present. Pulses: Normal pulses. Heart sounds: Normal heart sounds. No murmur heard. Pulmonary:      Effort: Pulmonary effort is normal. No respiratory distress. Breath sounds: Normal breath sounds. No wheezing. Abdominal:      General: Bowel sounds are normal. There is no distension. Palpations: Abdomen is soft. Tenderness: There is no abdominal tenderness. There is no guarding or rebound. Musculoskeletal:      Right lower leg: No edema. Left lower leg: No edema. Skin:     General: Skin is warm. Coloration: Skin is not jaundiced. Neurological:      General: No focal deficit present. Mental Status: She is alert and oriented to person, place, and time. Mental status is at baseline. Psychiatric:         Mood and Affect: Mood normal.         Behavior: Behavior normal.         Thought Content:  Thought content normal.           Assessment:        Primary Problem  Multifocal pneumonia    Active Hospital Problems    Diagnosis Date Noted    Acute pancreatitis [K85.90] 03/14/2022    Multifocal pneumonia [J18.9] 19/71/2226    Alcoholic hepatitis [Z35.68] 03/14/2022    GAIL (acute kidney injury) ([facility] [N17.9] 03/13/2022    DKA, type 2, not at goal [facility]) [E11.10] 03/12/2022    Alcohol intoxication ([facility] [F10.929] 12/16/2015       Plan:        Sepsis secondary to multifocal pneumonia of left lower lobe versus UTI (improving): · Afebrile, , WBC within normal limits   · On 4 L oxygen via nasal cannula  · CT abdomen/pelvis: Findings of multifocal pneumonia of left lower lobe  · Respiratory panel negative and procalcitonin elevated 0.47  · Urine cultures growing Klebsiella pneumonia and Enterococcus faecalis, C. difficile negative, blood cultures no growth  · Discontinued Flagyl, vancomycin, cefepime after 3 days  · Infectious disease consulted: Levaquin IV  Possible heparin-induced thrombocytopenia:  · Platelets dropped from 278k on admission to 37k  · Lovenox held, HIT panel pending  · Hematology oncology consulted  Atrial fibrillation: Lopressor 5 mg IV as needed, cardiology consulted: Echo LVEF>55%, oral Cardizem  Diabetes mellitus: Lantus 50 units subcutaneous daily, high-dose insulin sliding scale, hypoglycemia protocol; advancing to regular diet  Acute pancreatitis: Inflammatory changes surrounding the pancreas on CT abdomen pelvis, lipase downtrending, continue fluids  Alcohol abuse: Librium 25 mg 3 times daily, thiamine 50 mg IV  GAIL (improving): Creatinine 1.50 on admission, now 0.66; continue fluids  Alcoholic hepatitis: Elevated LFTs, downtrending; CT abdomen/pelvis showing fatty liver  Dilutional anemia: No gross bleeding, hemoglobin 11.2, continue trending  Diabetic Ketoacidosis, 2/2 poor insulin compliance (resolved)  · Discontinued IV fluids and insulin drip    Comorbid conditions:  Depression: Wellbutrin, fluoxetine  Neuropathy: Lamictal  Hyperlipidemia: Pravastatin  Hypertension: Holding lisinopril  History of SVT: Diltiazem     DVT prophylaxis: Lovenox 120 mg subcutaneous daily  GI prophylaxis:  Pepcid 20 mg IV twice daily  Disposition: Likely home  Code: Full Code   Diet: ADULT DIET;  Regular; 4 carb choices (60 gm/meal)   Consults: IP CONSULT TO INTERNAL MEDICINE  IP CONSULT TO PULMONOLOGY  IP CONSULT TO SOCIAL WORK  IP CONSULT TO SOCIAL WORK  PHARMACY TO DOSE VANCOMYCIN  IP CONSULT TO CARDIOLOGY  IP CONSULT TO INFECTIOUS DISEASES     Renay France MD  3/16/2022  8:38 AM     Attending Physician Statement  I have discussed the care of Clarissa Floyd and I have examined the patient myselft and taken ros and hpi , including pertinent history and exam findings,  with the resident. I have reviewed the key elements of all parts of the encounter with the resident. I agree with the assessment, plan and orders as documented by the resident.   Severe thrombocytopenia, hematology on board , stopped lovenox, HIT panel pending   Sepsis   Afib , still not rate controlled   Cardio on board       Electronically signed by Terence Keller MD

## 2022-03-17 ENCOUNTER — APPOINTMENT (OUTPATIENT)
Dept: NON INVASIVE DIAGNOSTICS | Age: 42
DRG: 871 | End: 2022-03-17
Payer: MEDICARE

## 2022-03-17 PROBLEM — I26.99 PULMONARY EMBOLISM (HCC): Status: ACTIVE | Noted: 2022-03-17

## 2022-03-17 LAB
ABSOLUTE EOS #: 0.09 K/UL (ref 0–0.4)
ABSOLUTE LYMPH #: 0.32 K/UL (ref 1–4.8)
ABSOLUTE MONO #: 0.55 K/UL (ref 0.1–1.3)
ANION GAP SERPL CALCULATED.3IONS-SCNC: 8 MMOL/L (ref 9–17)
BASOPHILS # BLD: 1 % (ref 0–2)
BASOPHILS ABSOLUTE: 0.05 K/UL (ref 0–0.2)
BUN BLDV-MCNC: 6 MG/DL (ref 6–20)
CALCIUM SERPL-MCNC: 6.6 MG/DL (ref 8.6–10.4)
CHLORIDE BLD-SCNC: 108 MMOL/L (ref 98–107)
CO2: 21 MMOL/L (ref 20–31)
CREAT SERPL-MCNC: 0.58 MG/DL (ref 0.5–0.9)
CULTURE: NORMAL
EOSINOPHILS RELATIVE PERCENT: 2 % (ref 0–4)
GFR AFRICAN AMERICAN: >60 ML/MIN
GFR NON-AFRICAN AMERICAN: >60 ML/MIN
GFR SERPL CREATININE-BSD FRML MDRD: ABNORMAL ML/MIN/{1.73_M2}
GLUCOSE BLD-MCNC: 116 MG/DL (ref 70–99)
GLUCOSE BLD-MCNC: 128 MG/DL (ref 65–105)
GLUCOSE BLD-MCNC: 203 MG/DL (ref 65–105)
GLUCOSE BLD-MCNC: 66 MG/DL (ref 65–105)
GLUCOSE BLD-MCNC: 96 MG/DL (ref 65–105)
GLUCOSE BLD-MCNC: 97 MG/DL (ref 65–105)
HCT VFR BLD CALC: 29.7 % (ref 36–46)
HEMOGLOBIN: 9.7 G/DL (ref 12–16)
HEPARIN INDUCED PLATELET ANTIBODY: 0.25 O.D. (ref 0–0.4)
LYMPHOCYTES # BLD: 7 % (ref 24–44)
MAGNESIUM: 1.7 MG/DL (ref 1.6–2.6)
MCH RBC QN AUTO: 29.7 PG (ref 26–34)
MCHC RBC AUTO-ENTMCNC: 32.5 G/DL (ref 31–37)
MCV RBC AUTO: 91.5 FL (ref 80–100)
MONOCYTES # BLD: 12 % (ref 1–7)
MORPHOLOGY: ABNORMAL
NUCLEATED RED BLOOD CELLS: 1 PER 100 WBC
PDW BLD-RTO: 14.4 % (ref 11.5–14.9)
PHOSPHORUS: 2.3 MG/DL (ref 2.6–4.5)
PLATELET # BLD: 69 K/UL (ref 150–450)
PLATELET # BLD: 74 K/UL (ref 150–450)
PLATELET # BLD: 84 K/UL (ref 150–450)
PMV BLD AUTO: 10.2 FL (ref 6–12)
POTASSIUM SERPL-SCNC: 3.3 MMOL/L (ref 3.7–5.3)
RBC # BLD: 3.24 M/UL (ref 4–5.2)
SEG NEUTROPHILS: 78 % (ref 36–66)
SEGMENTED NEUTROPHILS ABSOLUTE COUNT: 3.54 K/UL (ref 1.3–9.1)
SODIUM BLD-SCNC: 137 MMOL/L (ref 135–144)
SPECIMEN DESCRIPTION: NORMAL
SURGICAL PATHOLOGY REPORT: NORMAL
WBC # BLD: 4.6 K/UL (ref 3.5–11)

## 2022-03-17 PROCEDURE — 6370000000 HC RX 637 (ALT 250 FOR IP): Performed by: NURSE PRACTITIONER

## 2022-03-17 PROCEDURE — 6360000002 HC RX W HCPCS: Performed by: INTERNAL MEDICINE

## 2022-03-17 PROCEDURE — 2580000003 HC RX 258: Performed by: STUDENT IN AN ORGANIZED HEALTH CARE EDUCATION/TRAINING PROGRAM

## 2022-03-17 PROCEDURE — A4216 STERILE WATER/SALINE, 10 ML: HCPCS | Performed by: INTERNAL MEDICINE

## 2022-03-17 PROCEDURE — 84100 ASSAY OF PHOSPHORUS: CPT

## 2022-03-17 PROCEDURE — 99232 SBSQ HOSP IP/OBS MODERATE 35: CPT | Performed by: INTERNAL MEDICINE

## 2022-03-17 PROCEDURE — 6370000000 HC RX 637 (ALT 250 FOR IP)

## 2022-03-17 PROCEDURE — 82947 ASSAY GLUCOSE BLOOD QUANT: CPT

## 2022-03-17 PROCEDURE — 2500000003 HC RX 250 WO HCPCS: Performed by: STUDENT IN AN ORGANIZED HEALTH CARE EDUCATION/TRAINING PROGRAM

## 2022-03-17 PROCEDURE — 6370000000 HC RX 637 (ALT 250 FOR IP): Performed by: INTERNAL MEDICINE

## 2022-03-17 PROCEDURE — C9113 INJ PANTOPRAZOLE SODIUM, VIA: HCPCS | Performed by: INTERNAL MEDICINE

## 2022-03-17 PROCEDURE — 93308 TTE F-UP OR LMTD: CPT

## 2022-03-17 PROCEDURE — 6370000000 HC RX 637 (ALT 250 FOR IP): Performed by: STUDENT IN AN ORGANIZED HEALTH CARE EDUCATION/TRAINING PROGRAM

## 2022-03-17 PROCEDURE — 80048 BASIC METABOLIC PNL TOTAL CA: CPT

## 2022-03-17 PROCEDURE — 2060000000 HC ICU INTERMEDIATE R&B

## 2022-03-17 PROCEDURE — 36415 COLL VENOUS BLD VENIPUNCTURE: CPT

## 2022-03-17 PROCEDURE — 85025 COMPLETE CBC W/AUTO DIFF WBC: CPT

## 2022-03-17 PROCEDURE — 99233 SBSQ HOSP IP/OBS HIGH 50: CPT | Performed by: INTERNAL MEDICINE

## 2022-03-17 PROCEDURE — 83735 ASSAY OF MAGNESIUM: CPT

## 2022-03-17 PROCEDURE — 85049 AUTOMATED PLATELET COUNT: CPT

## 2022-03-17 PROCEDURE — 2580000003 HC RX 258: Performed by: INTERNAL MEDICINE

## 2022-03-17 RX ORDER — GUAIFENESIN 600 MG/1
600 TABLET, EXTENDED RELEASE ORAL 2 TIMES DAILY
Status: DISCONTINUED | OUTPATIENT
Start: 2022-03-17 | End: 2022-03-19 | Stop reason: HOSPADM

## 2022-03-17 RX ORDER — POTASSIUM CHLORIDE 7.45 MG/ML
10 INJECTION INTRAVENOUS PRN
Status: DISCONTINUED | OUTPATIENT
Start: 2022-03-17 | End: 2022-03-19 | Stop reason: HOSPADM

## 2022-03-17 RX ORDER — CHLORDIAZEPOXIDE HYDROCHLORIDE 10 MG/1
10 CAPSULE, GELATIN COATED ORAL 3 TIMES DAILY
Status: DISCONTINUED | OUTPATIENT
Start: 2022-03-17 | End: 2022-03-19 | Stop reason: HOSPADM

## 2022-03-17 RX ORDER — POTASSIUM CHLORIDE 20 MEQ/1
40 TABLET, EXTENDED RELEASE ORAL PRN
Status: DISCONTINUED | OUTPATIENT
Start: 2022-03-17 | End: 2022-03-19 | Stop reason: HOSPADM

## 2022-03-17 RX ADMIN — POTASSIUM CHLORIDE 40 MEQ: 20 TABLET, EXTENDED RELEASE ORAL at 18:11

## 2022-03-17 RX ADMIN — ANTI-FUNGAL POWDER MICONAZOLE NITRATE TALC FREE: 1.42 POWDER TOPICAL at 11:59

## 2022-03-17 RX ADMIN — CHLORDIAZEPOXIDE HYDROCHLORIDE 10 MG: 10 CAPSULE ORAL at 21:24

## 2022-03-17 RX ADMIN — PRAVASTATIN SODIUM 40 MG: 40 TABLET ORAL at 21:24

## 2022-03-17 RX ADMIN — INSULIN LISPRO 6 UNITS: 100 INJECTION, SOLUTION INTRAVENOUS; SUBCUTANEOUS at 12:12

## 2022-03-17 RX ADMIN — DILTIAZEM HYDROCHLORIDE 30 MG: 30 TABLET, FILM COATED ORAL at 14:31

## 2022-03-17 RX ADMIN — DILTIAZEM HYDROCHLORIDE 30 MG: 30 TABLET, FILM COATED ORAL at 21:24

## 2022-03-17 RX ADMIN — GUAIFENESIN 600 MG: 600 TABLET, EXTENDED RELEASE ORAL at 14:34

## 2022-03-17 RX ADMIN — HYDROCODONE BITARTRATE AND ACETAMINOPHEN 1 TABLET: 5; 325 TABLET ORAL at 09:51

## 2022-03-17 RX ADMIN — LAMOTRIGINE 200 MG: 100 TABLET ORAL at 11:59

## 2022-03-17 RX ADMIN — LEVOFLOXACIN 750 MG: 5 INJECTION, SOLUTION INTRAVENOUS at 17:47

## 2022-03-17 RX ADMIN — GUAIFENESIN 600 MG: 600 TABLET, EXTENDED RELEASE ORAL at 21:24

## 2022-03-17 RX ADMIN — DILTIAZEM HYDROCHLORIDE 30 MG: 30 TABLET, FILM COATED ORAL at 05:56

## 2022-03-17 RX ADMIN — ACETAMINOPHEN 650 MG: 325 TABLET, FILM COATED ORAL at 00:03

## 2022-03-17 RX ADMIN — FLUOXETINE HYDROCHLORIDE 20 MG: 20 CAPSULE ORAL at 09:40

## 2022-03-17 RX ADMIN — SODIUM CHLORIDE 40 MG: 9 INJECTION INTRAMUSCULAR; INTRAVENOUS; SUBCUTANEOUS at 09:40

## 2022-03-17 RX ADMIN — SODIUM CHLORIDE 25 ML: 9 INJECTION, SOLUTION INTRAVENOUS at 02:52

## 2022-03-17 RX ADMIN — HYDROCODONE BITARTRATE AND ACETAMINOPHEN 1 TABLET: 5; 325 TABLET ORAL at 02:49

## 2022-03-17 RX ADMIN — CHLORDIAZEPOXIDE HYDROCHLORIDE 25 MG: 25 CAPSULE ORAL at 14:31

## 2022-03-17 RX ADMIN — BUPROPION HYDROCHLORIDE 300 MG: 300 TABLET, FILM COATED, EXTENDED RELEASE ORAL at 09:40

## 2022-03-17 RX ADMIN — SODIUM CHLORIDE, PRESERVATIVE FREE 10 ML: 5 INJECTION INTRAVENOUS at 09:52

## 2022-03-17 RX ADMIN — SODIUM CHLORIDE, PRESERVATIVE FREE 10 ML: 5 INJECTION INTRAVENOUS at 22:06

## 2022-03-17 RX ADMIN — ANTI-FUNGAL POWDER MICONAZOLE NITRATE TALC FREE: 1.42 POWDER TOPICAL at 21:25

## 2022-03-17 RX ADMIN — HYDROCODONE BITARTRATE AND ACETAMINOPHEN 1 TABLET: 5; 325 TABLET ORAL at 21:58

## 2022-03-17 RX ADMIN — FONDAPARINUX SODIUM 10 MG: 10 INJECTION, SOLUTION SUBCUTANEOUS at 11:59

## 2022-03-17 RX ADMIN — INSULIN GLARGINE 50 UNITS: 100 INJECTION, SOLUTION SUBCUTANEOUS at 09:42

## 2022-03-17 RX ADMIN — HYDROCODONE BITARTRATE AND ACETAMINOPHEN 1 TABLET: 5; 325 TABLET ORAL at 15:58

## 2022-03-17 RX ADMIN — CHLORDIAZEPOXIDE HYDROCHLORIDE 25 MG: 25 CAPSULE ORAL at 09:40

## 2022-03-17 ASSESSMENT — PAIN SCALES - GENERAL
PAINLEVEL_OUTOF10: 7
PAINLEVEL_OUTOF10: 6
PAINLEVEL_OUTOF10: 7

## 2022-03-17 NOTE — PROGRESS NOTES
Lasix [furosemide], Macrobid [nitrofurantoin], Norco [hydrocodone-acetaminophen], Betadine [povidone iodine], Codeine, Lithium, Paroxetine, Paxil [paroxetine hcl], Tape [adhesive tape], and Tegretol [carbamazepine]     Review of Systems:     Review of Systems  As per history of present illness, other than above 12 system review was negative  Physical Examination :   Physical Exam  Constitutional:       General: She is not in acute distress. Appearance: Normal appearance. HENT:      Head: Normocephalic and atraumatic. Right Ear: External ear normal.      Left Ear: External ear normal.      Nose: Nose normal.   Eyes:      Extraocular Movements: Extraocular movements intact. Cardiovascular:      Rate and Rhythm: Regular      Pulses: Normal pulses. Heart sounds: Normal heart sounds. No murmur heard.       Pulmonary:      Effort: Pulmonary effort is normal. No respiratory distress. Breath sounds: Normal breath sounds. No wheezing. Abdominal:      General: Bowel sounds are normal. There is no distension. Palpations: Abdomen is soft. Tenderness: There is no abdominal tenderness. There is no guarding or rebound. Musculoskeletal:      Right lower leg: No edema. Left lower leg: No edema. Skin:     General: Skin is warm. Coloration: Skin is not jaundiced. Neurological:      General: No focal deficit present. Mental Status: She is alert and oriented to person, place, and time. Mental status is at baseline. Psychiatric:         Mood and Affect: Mood normal.         Behavior: Behavior normal.         Thought Content:  Thought content normal.        Physical Exam    Past Medical History:     Past Medical History:   Diagnosis Date    Alcohol abuse     sober [id number]    Anxiety     Arthritis     Painting esophagus     Benzodiazepine overdose 9/26/2015    Bipolar disorder, unspecified [facility] [address] [facility]     Bipolar I disorder, most recent episode depressed, severe without psychotic features ([address]     Cellulitis 11/17/2018    Chronic abdominal wound infection 9/27/2015    Constipation 9/3/2019    Depression 7/12/2015    Drug overdose, intentional ([address] 7/12/2015    Genital herpes, unspecified     GERD (gastroesophageal reflux disease)     History of pulmonary embolism     Hx of blood clots dx 2 years ago    clots in both legs and lungs     Hypertension     Iron deficiency anemia     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    Lumbosacral spondylosis without myelopathy     MDRO (multiple drug resistant organisms) resistance 2010    MRSA (abd)    Miscarriage     multiple, around 7th mos pregnant each time d/t hypercoagulation    Morbid obesity ([address]     MRSA (methicillin resistant staph aureus) culture positive 02/10/2017    urine    Overdose of benzodiazepine     Pernicious anemia     Primary hypercoagulable state ([address]     antiphospholipid antibodies on Arixtra shots daily    Pulmonary embolism ([address] 2010    Suicidal behavior 12/14/2015    Suicidal ideation     Suicide attempt ([address] 2014    hx OD on painkillers and rubbing alcohol    SVT (supraventricular tachycardia) ([address] 04/07/2017    Toxic effect of ethanol, intentional self-harm ([address] 12/16/2015    Type 2 diabetes mellitus, with long-term current use of insulin ([address]        Past Surgical  History:     Past Surgical History:   Procedure Laterality Date    ABDOMINAL HERNIA REPAIR  2014    wound vac    ABDOMINAL HERNIA REPAIR  11/3/14    BARIATRIC SURGERY  2013    [facility]    CHOLECYSTECTOMY      DILATION AND CURETTAGE OF UTERUS  2005    ENDOSCOPY, COLON, DIAGNOSTIC      EGD    FINGER AMPUTATION Left 02/09/2015    index and ring finger.  from frostbite    HERNIA REPAIR      total of 8    IVC FILTER INSERTION      LIVER RESECTION      for hepatic adenoma    LYMPH NODE BIOPSY  1990    JUSTINE AND BSO  2011    TONSILLECTOMY         Medications:      insulin glargine  50 Units SubCUTAneous Daily    Stress:     Feeling of Stress : Not on file   Social Connections:     Frequency of Communication with Friends and Family: Not on file    Frequency of Social Gatherings with Friends and Family: Not on file    Attends Congregation Services: Not on file    Active Member of Clubs or Organizations: Not on file    Attends Club or Organization Meetings: Not on file    Marital Status: Not on file   Intimate Partner Violence:     Fear of Current or Ex-Partner: Not on file    Emotionally Abused: Not on file    Physically Abused: Not on file    Sexually Abused: Not on file   Housing Stability:     Unable to Pay for Housing in the Last Year: Not on file    Number of [place] in the Last Year: Not on file    Unstable Housing in the Last Year: Not on file       Family History:     Family History   Problem Relation Age of Onset    Depression Mother     High Blood Pressure Mother     High Cholesterol Mother     Diabetes Father     Heart Disease Father     Kidney Disease Father     High Blood Pressure Father     High Cholesterol Father     Cancer Maternal Uncle         of duodenum had whipple    Breast Cancer Maternal Aunt     Breast Cancer Maternal Cousin       Medical Decision Making:   I have independently reviewed/ordered the following labs:    CBC with Differential:   Recent Labs     03/16/22  0356 03/16/22  1627 03/16/22  2150 03/17/22  0450   WBC 5.1  --   --  4.6   HGB 11.1*  --   --  9.7*   HCT 33.4*  --   --  29.7*   PLT 34*   < > 52* 69*   LYMPHOPCT 8*  --   --  7*   MONOPCT 16*  --   --  12*    < > = values in this interval not displayed.      BMP:  Recent Labs     03/16/22  0356 03/17/22  0450   * 137   K 3.9 3.3*    108*   CO2 21 21   BUN 7 6   CREATININE 0.68 0.58   MG 1.8 1.7     Hepatic Function Panel:   Recent Labs     03/15/22  0609   PROT 3.9*   LABALBU 1.6*   BILIDIR 0.37*   IBILI 0.32   BILITOT 0.69   ALKPHOS 313*   *   AST 56*     No results for input(s): RPR in the last 72 hours. No results for input(s): HIV in the last 72 hours. No results for input(s): BC in the last 72 hours. Lab Results   Component Value Date    CREATININE 0.58 03/17/2022    GLUCOSE 116 03/17/2022    GLUCOSE 403 06/03/2012       Detailed results: Thank you for allowing us to participate in the care of this patient. Please call with questions. This note is created with the assistance of a speech recognition program.  While intending to generate adocument that actually reflects the content of the visit, the document can still have some errors including those of syntax and sound a like substitutions which may escape proof reading. It such instances, actual meaningcan be extrapolated by contextual diversion.     Татьяна Henson MD  Office: [phone]  Perfect serve / office [phone]

## 2022-03-17 NOTE — CARE COORDINATION
ONGOING DISCHARGE PLAN:    Patient is alert and oriented x4. Spoke with patient regarding discharge plan and patient confirms that plan is LSW to see patient in regards to [facility]. Patient has been to [address] in the past.     Pt is from home with . [facility] following patient. Patient wants a walker PT/OT to eval once patient is able to participate. On IV Levaquin. CTA revealed PEs. Carido on board repeat echo done today awaiting results. Pulm on board plan for repeat CXR tomorrow. Currently on 3L 02 NC. Will follow for home 02 needs. Will continue to follow for additional discharge needs.     Electronically signed by Kwaku Olson RN on 3/17/2022 at 4:30 PM

## 2022-03-17 NOTE — PROGRESS NOTES
[address]   OCCUPATIONAL THERAPY MISSED TREATMENT NOTE   INPATIENT   Date: 3/17/22  Patient Name: [place]       Room:   MRN: [id number]   Account #: [de-identified]    : 1980  (42 y.o.)  Gender: female                 REASON FOR MISSED TREATMENT:  Hold treatment per nursing request   -   Upon entry, pt stated, \"I was diagnosed with multiple bilateral PEs yesterday. Doctor said some are pretty severe. \" Discussed with RN, who verified and recommended hold OT eval until tomorrow. OT will continue to follow and assess once medically appropriate.  [address], OT

## 2022-03-17 NOTE — FLOWSHEET NOTE
03/17/22 1439   Treatment Team Notification   Reason for Communication Evaluate   Team Member Name Internal Medicine   Treatment Team Role Attending Provider   Method of Communication Call   Response Waiting for response   Potassium 3.3.

## 2022-03-17 NOTE — PROGRESS NOTES
[facility]    PROGRESS NOTE             3/17/2022    9:47 AM    Name:   Vito Camejo  MRN:     [id number]     Acct:      [de-identified]   Room:   2114/2114-01   Day:  5  Admit Date:  3/12/2022  7:42 AM    PCP:  Lupe Doughetry  Code Status:  Full Code    Subjective:     C/C:   Chief Complaint   Patient presents with    Alcohol Intoxication     Interval History Status: improved. No acute events overnight. Patient seen at bedside this morning. Patient tolerating regular diet with no nausea or vomiting. She endorses some back and hip pain. Patient denies any abdominal pain, chest pain, or new shortness of breath at this time. She agrees to the plan of care. Brief History:     Patient is a 60-year-old female with medical history of anxiety, bipolar disorder, history of pulmonary embolism, hypertension, iron deficiency anemia, suicidal ideation, type 2 diabetes mellitus, antiphospholipid syndrome who presents with chief complaint of alcohol intoxication. Patient reports that she has not been taking insulin for the last few days as she \"forgot\". Patient was quite somnolent at time of exam.  She did not report any significant pain and significant alcohol use last night.     In the ED, patient was found to be tachycardic and hypoxic. Patient was placed on 2 L of oxygen via nasal cannula. Patient's VBG's were remarkable for pH of 7.187. Other remarkable labs include sodium of 111, creatinine of 1.50, lactic acid of 10.2, glucose of 1255, alkaline phosphatase of 411, ALT of 504, AST of 2478, ethanol level of 404. Remarkable studies include:  · Chest x-ray showing diffuse interstitial opacities in the perihilar areas consistent with multi focal airspace disease  · CT head without contrast showing no acute intracranial abnormality     Patient was treated in the ED with fluids and insulin continuous infusion.   Patient was placed on DKA protocol and decision was made to admit the patient to the ICU with consults in place for pulmonology critical care. Patient was eventually transitioned over to basal Lantus with insulin sliding scale. Her diet was advanced appropriately. Patient was also started on broad-spectrum antibiotics initially for multifocal pneumonia and UTI which was later switched to Levaquin. Patient's labs later became remarkable for thrombocytopenia, during which Lovenox was discontinued and HIT panel was initiated. She developed acute pulmonary embolism and patient was started on Arixtra. Review of Systems:     Review of Systems   Constitutional: Negative for appetite change, chills, fatigue and fever. HENT: Negative for congestion and sore throat. Eyes: Negative for redness. Respiratory: Negative for chest tightness, shortness of breath and wheezing. Cardiovascular: Negative for chest pain, palpitations and leg swelling. Gastrointestinal: Negative for abdominal pain, nausea and vomiting. Genitourinary: Negative for dysuria and hematuria. Musculoskeletal: Negative for back pain. Skin: Negative for color change. Neurological: Negative for dizziness, weakness, light-headedness and headaches. Psychiatric/Behavioral: Negative for confusion and decreased concentration. Medications:      Allergies:  Asa [aspirin], Betadine [povidone iodine], Celexa [citalopram hydrobromide], Citalopram, Lasix [furosemide], Macrobid [nitrofurantoin], Norco [hydrocodone-acetaminophen], Betadine [povidone iodine], Codeine, Lithium, Paroxetine, Paxil [paroxetine hcl], Tape [adhesive tape], and Tegretol [carbamazepine]    Current Meds:   Scheduled Meds:    insulin glargine  50 Units SubCUTAneous Daily    pravastatin  40 mg Oral Nightly    dilTIAZem  30 mg Oral 3 times per day    chlordiazePOXIDE  25 mg Oral TID    pantoprazole (PROTONIX) 40 mg injection  40 mg IntraVENous Daily    fondaparinux  10 mg SubCUTAneous Daily    levofloxacin  750 mg IntraVENous Q24H    insulin lispro  0-18 Units SubCUTAneous TID WC    insulin lispro  0-9 Units SubCUTAneous Nightly    miconazole   Topical BID    lamoTRIgine  200 mg Oral Daily    buPROPion  300 mg Oral QAM    FLUoxetine  20 mg Oral Daily    sodium chloride flush  5-40 mL IntraVENous 2 times per day    sodium chloride flush  5-40 mL IntraVENous 2 times per day     Continuous Infusions:    dextrose      sodium chloride 25 mL (03/17/22 0252)     PRN Meds: sodium chloride flush, HYDROcodone 5 mg - acetaminophen, perflutren lipid microspheres, metoprolol, sodium chloride flush, glucose, dextrose, glucagon (rDNA), dextrose, potassium chloride, magnesium sulfate, sodium phosphate IVPB **OR** sodium phosphate IVPB **OR** sodium phosphate IVPB, polyethylene glycol, sodium chloride flush, sodium chloride, ondansetron **OR** ondansetron, acetaminophen **OR** acetaminophen    Data:     Past Medical History:    Past Medical History:   Diagnosis Date    Alcohol abuse     sober since5    Anxiety     Arthritis     Painting esophagus     Benzodiazepine overdose 9/26/2015    [facility], [facility] [address]     Bipolar I disorder, most recent episode depressed, severe without psychotic features ([facility] [address]     Cellulitis 11/17/2018    Chronic abdominal wound infection 9/27/2015    Constipation 9/3/2019    Depression 7/12/2015    Drug overdose, intentional ([facility] [address] 7/12/2015    Genital herpes, unspecified     GERD (gastroesophageal reflux disease)     History of pulmonary embolism     Hx of blood clots dx 2 years ago    clots in both legs and lungs     Hypertension     Iron deficiency anemia     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 [address]     antiphospholipid antibodies on Arixtra shots daily    Pulmonary embolism ([facility] [address]     Suicidal behavior 2015    Suicidal ideation     Suicide attempt ([facility] [address] 2014    hx OD on painkillers and rubbing alcohol    SVT (supraventricular tachycardia) ([facility] [address] 2017    Toxic effect of ethanol, intentional self-harm ([facility] [address] 2015    Type 2 diabetes mellitus, with long-term current use of insulin ([facility] [address]        Social History:    Tobacco:   Patient  reports that she has never smoked. She has never used smokeless tobacco.  Alcohol:     Patient  reports no history of alcohol use. Drug Use: Patient  reports no history of drug use. Family History:   Family History   Problem Relation Age of Onset    Depression Mother     High Blood Pressure Mother     High Cholesterol Mother     Diabetes Father     Heart Disease Father     Kidney Disease Father     High Blood Pressure Father     High Cholesterol Father     Cancer Maternal Uncle         of duodenum had whipple    Breast Cancer Maternal Aunt     Breast Cancer Maternal Cousin        Vitals:  BP 90/78   Pulse 110   Temp 97.9 °F (36.6 °C) (Oral)   Resp 20   Ht 5' 8\" (1.727 m)   Wt 280 lb (127 kg)   SpO2 98%   BMI 42.57 kg/m²   Temp (24hrs), Av.6 °F (37.6 °C), Min:97.9 °F (36.6 °C), Max:100.6 °F (38.1 °C)    Vitals:    22 2054 22 0002 22 0256 22 0815   BP:    90/78   Pulse: 108   110   Resp:    20   Temp:  100.3 °F (37.9 °C) 99.4 °F (37.4 °C) 97.9 °F (36.6 °C)   TempSrc:  Oral Axillary Oral   SpO2:    98%   Weight:       Height:           O2 Requirements: 3 L oxygen via nasal cannula    Lines/Drains/Access: Peripheral IV, PICC double-lumen, urethral catheter    I/O(24Hr):     Intake/Output Summary (Last 24 hours) at 3/17/2022 0947  Last data filed at 3/17/2022 0301  Gross per 24 hour   Intake --   Output 5100 ml   Net -5100 ml       Labs:  Recent Results (from the past 24 hour(s))   POC Glucose Fingerstick    Collection Time: 03/16/22 11:01 AM   Result Value Ref Range    POC Glucose 277 (H) 65 - 105 mg/dL   D-Dimer, Quantitative    Collection Time: 03/16/22  1:31 PM   Result Value Ref Range    D-Dimer, Quant 1.32 (H) 0.00 - 0.59 mg/L FEU   Lactate Dehydrogenase    Collection Time: 03/16/22  4:27 PM   Result Value Ref Range     (H) 135 - 214 U/L   Haptoglobin    Collection Time: 03/16/22  4:27 PM   Result Value Ref Range    Haptoglobin 327 (H) 30 - 200 mg/dL   Fibrinogen    Collection Time: 03/16/22  4:27 PM   Result Value Ref Range    Fibrinogen 623 (H) 210 - 530 mg/dL   Vitamin B12 & Folate    Collection Time: 03/16/22  4:27 PM   Result Value Ref Range    Vitamin B-12 >2000 (H) 232 - 1245 pg/mL    Folate >20.0 >4.8 ng/mL   Iron and TIBC    Collection Time: 03/16/22  4:27 PM   Result Value Ref Range    Iron 19 (L) 37 - 145 ug/dL    TIBC 82 (L) 250 - 450 ug/dL    Iron Saturation 23 20 - 55 %    UIBC 63 (L) 112 - 347 ug/dL   Ferritin    Collection Time: 03/16/22  4:27 PM   Result Value Ref Range    Ferritin 781 (H) 13 - 150 ug/L   Reticulocytes    Collection Time: 03/16/22  4:27 PM   Result Value Ref Range    Retic % 0.3 (L) 0.5 - 2.0 %    Absolute Retic # 0.011 (L) 0.0245 - 0.098 M/uL   Platelet Count    Collection Time: 03/16/22  4:27 PM   Result Value Ref Range    Platelets 37 (L) 358 - 450 k/uL   POC Glucose Fingerstick    Collection Time: 03/16/22  4:28 PM   Result Value Ref Range    POC Glucose 147 (H) 65 - 105 mg/dL   POC Glucose Fingerstick    Collection Time: 03/16/22  9:05 PM   Result Value Ref Range    POC Glucose 105 65 - 105 mg/dL   Platelet Count    Collection Time: 03/16/22  9:50 PM   Result Value Ref Range    Platelets 52 (L) 586 - 450 k/uL   CBC with Auto Differential    Collection Time: 03/17/22  4:50 AM   Result Value Ref Range WBC 4.6 3.5 - 11.0 k/uL    RBC 3.24 (L) 4.0 - 5.2 m/uL    Hemoglobin 9.7 (L) 12.0 - 16.0 g/dL    Hematocrit 29.7 (L) 36 - 46 %    MCV 91.5 80 - 100 fL    MCH 29.7 26 - 34 pg    MCHC 32.5 31 - 37 g/dL    RDW 14.4 11.5 - 14.9 %    Platelets 69 (L) 943 - 450 k/uL    MPV 10.2 6.0 - 12.0 fL    Seg Neutrophils 78 (H) 36 - 66 %    Lymphocytes 7 (L) 24 - 44 %    Monocytes 12 (H) 1 - 7 %    Eosinophils % 2 0 - 4 %    Basophils 1 0 - 2 %    nRBC 1 (H) 0 per 100 WBC    Segs Absolute 3.54 1.3 - 9.1 k/uL    Absolute Lymph # 0.32 (L) 1.0 - 4.8 k/uL    Absolute Mono # 0.55 0.1 - 1.3 k/uL    Absolute Eos # 0.09 0.0 - 0.4 k/uL    Basophils Absolute 0.05 0.0 - 0.2 k/uL    Morphology TOXIC GRANULATION PRESENT    Basic Metabolic Panel    Collection Time: 03/17/22  4:50 AM   Result Value Ref Range    Glucose 116 (H) 70 - 99 mg/dL    BUN 6 6 - 20 mg/dL    CREATININE 0.58 0.50 - 0.90 mg/dL    Calcium 6.6 (L) 8.6 - 10.4 mg/dL    Sodium 137 135 - 144 mmol/L    Potassium 3.3 (L) 3.7 - 5.3 mmol/L    Chloride 108 (H) 98 - 107 mmol/L    CO2 21 20 - 31 mmol/L    Anion Gap 8 (L) 9 - 17 mmol/L    GFR Non-African American >60 >60 mL/min    GFR African American >60 >60 mL/min    GFR Comment         Magnesium    Collection Time: 03/17/22  4:50 AM   Result Value Ref Range    Magnesium 1.7 1.6 - 2.6 mg/dL   Phosphorus    Collection Time: 03/17/22  4:50 AM   Result Value Ref Range    Phosphorus 2.3 (L) 2.6 - 4.5 mg/dL   POC Glucose Fingerstick    Collection Time: 03/17/22  8:11 AM   Result Value Ref Range    POC Glucose 128 (H) 65 - 105 mg/dL       Lab Results   Component Value Date/Time    SPECIAL NOT REPORTED 09/03/2019 07:52 PM     Lab Results   Component Value Date/Time    CULTURE KLEBSIELLA PNEUMONIAE >489557 CFU/ML (A) 03/13/2022 09:36 AM    CULTURE ENTEROCOCCUS FAECALIS >437414 CFU/ML (A) 03/13/2022 09:36 AM       Recent Labs     03/16/22  1101 03/16/22  1628 03/16/22  2105 03/17/22  0811   POCGLU 277* 147* 105 128*       Radiology:    CT Head WO Contrast    Result Date: 3/12/2022  No acute intracranial abnormality. CT ABDOMEN PELVIS W IV CONTRAST Additional Contrast? Radiologist Recommendation    Result Date: 3/14/2022  1. Multifocal pneumonia, most pronounced in the left lower lobe. 2. Inflammatory changes surrounding the pancreas concerning for acute pancreatitis. No focal fluid collection. 3. Fatty liver. 4. Anasarca. 5. Presacral edema without a discrete abscess. 6. Inflammatory changes surround the bladder, concerning for underlying infection. 7. No bowel obstruction. XR CHEST PORTABLE    Result Date: 3/13/2022  Intervally placed right upper extremity PICC distal tip projects over the right atrium. No discrete pneumothorax. Left basilar opacities persist and appear mildly worsened, concerning for developing infection. Aspiration may have a similar appearance. Low lung volumes. XR CHEST PORTABLE    Result Date: 3/12/2022  Low lung volumes. Diffuse interstitial opacities in the perihilar areas with left perihilar and lower lobe alveolar type opacities consistent with multifocal airspace disease. Physical Examination:        Physical Exam  Constitutional:       General: She is not in acute distress. Appearance: Normal appearance. She is obese. HENT:      Head: Normocephalic and atraumatic. Right Ear: External ear normal.      Left Ear: External ear normal.      Nose: Nose normal.   Eyes:      Extraocular Movements: Extraocular movements intact. Cardiovascular:      Rate and Rhythm: Regular rhythm. Tachycardia present. Pulses: Normal pulses. Heart sounds: Normal heart sounds. No murmur heard. Pulmonary:      Effort: Pulmonary effort is normal. No respiratory distress. Breath sounds: Normal breath sounds. No wheezing. Abdominal:      General: Bowel sounds are normal. There is no distension. Palpations: Abdomen is soft. Tenderness: There is no abdominal tenderness.  There is no guarding or rebound. Musculoskeletal:      Right lower leg: No edema. Left lower leg: No edema. Skin:     General: Skin is warm. Coloration: Skin is not jaundiced. Neurological:      General: No focal deficit present. Mental Status: She is alert and oriented to person, place, and time. Mental status is at baseline. Psychiatric:         Mood and Affect: Mood normal.         Behavior: Behavior normal.         Thought Content:  Thought content normal.           Assessment:        Primary Problem  Multifocal pneumonia    Active Hospital Problems    Diagnosis Date Noted    Acute pancreatitis [K85.90] 03/14/2022    Multifocal pneumonia [J18.9] 61/52/5833    Alcoholic hepatitis [N88.54] 03/14/2022    GAIL (acute kidney injury) ([facility] [address] [N17.9] 03/13/2022    DKA, type 2, not at goal [facility]) [E11.10] 03/12/2022    Alcohol intoxication ([address] [F10.929] 12/16/2015       Plan:        Sepsis secondary to multifocal pneumonia of left lower lobe versus UTI (improving):   · Afebrile, , WBC within normal limits   · On 3 L oxygen via nasal cannula  · CT chest: Findings of multifocal pneumonia of left lower lobe and bilateral upper lobes  · Respiratory panel negative and procalcitonin elevated 0.47  · Urine cultures growing Klebsiella pneumonia and Enterococcus faecalis, C. difficile negative, blood cultures no growth  · Discontinued Flagyl, vancomycin, cefepime after 3 days  · Infectious disease consulted: Levaquin IV  Multilobar pulmonary embolism: D-dimer elevated, positive CT findings; restarted home Arixtra  Thrombocytopenia possibly 2/2 heparin-induced thrombocytopenia:  · Platelets dropped from 278k on admission to 37k, Lovenox held  · HIT panel, blood smear, LDH, haptoglobin, fibrinogen, B12, and iron studies pending  · Hematology oncology consulted: Restarted on Arixtra  Acute normocytic anemia: Hemoglobin 15.5 on admission now 9.7, continue trending; fecal occult pending, hematology onboard   Diabetes mellitus: Lantus 50 units subcutaneous daily, high-dose insulin sliding scale, hypoglycemia protocol; advancing to regular diet  Acute pancreatitis: Inflammatory changes surrounding the pancreas on CT abdomen pelvis, lipase downtrending, continue fluids  Alcohol abuse: Librium 25 mg 3 times daily, thiamine 50 mg IV  Alcoholic hepatitis: Elevated LFTs, downtrending; CT abdomen/pelvis showing fatty liver    Diabetic Ketoacidosis, 2/2 poor insulin compliance (resolved)  · Discontinued IV fluids and insulin drip  Atrial fibrillation (resolved): Cardiology consulted: Echo LVEF>55%, oral Cardizem 30 mg 3 times per day  GAIL (resolved): Creatinine 1.50 on admission, now 0.66; continue fluids    Comorbid conditions:  Depression: Wellbutrin, fluoxetine  Neuropathy: Lamictal  Hyperlipidemia: Pravastatin  Hypertension: Holding lisinopril  History of SVT: Diltiazem     DVT prophylaxis:  Arixtra 10 mg subcutaneous daily  GI prophylaxis:  Protonix 40 mg IV daily  Disposition: Likely home  Code: Full Code   Diet: ADULT DIET; Regular; 4 carb choices (60 gm/meal)   Consults: IP CONSULT TO INTERNAL MEDICINE  IP CONSULT TO PULMONOLOGY  IP CONSULT TO SOCIAL WORK  IP CONSULT TO SOCIAL WORK  PHARMACY TO DOSE VANCOMYCIN  IP CONSULT TO CARDIOLOGY  IP CONSULT TO INFECTIOUS DISEASES  IP CONSULT TO HEM/ONC     Janey Mendoza MD  3/17/2022  9:47 AM     Attending Physician Statement  I have discussed the care of Si Alma with the resident team. I have examined the patient myself and taken ros and hpi , including pertinent history and exam findings,  with the resident. I have reviewed the key elements of all parts of the encounter with the resident. I agree with the assessment, plan and orders as documented by the resident.     Principal Problem:    Multifocal pneumonia  Active Problems:    Alcohol intoxication ([facility] [address]    DKA, type 2, not at goal [facility])    GAIL (acute kidney injury) ([facility] [address]    Acute pancreatitis    Alcoholic hepatitis    Acute pulmonary embolism ([address]  Resolved Problems:    * No resolved hospital problems. *    DKA resolved. HIT panel neg  Pt on arixtra now for PE  Sepsis from multifocal pneumonia klebsiella  UTI from e. Fecalis  Hemoglobin drop; no active bleed will monitor. Fecal occult blood to be checked.    Also check leg vein doppler    Electronically signed by Johan Tristan MD

## 2022-03-17 NOTE — PROGRESS NOTES
Today's Date: 3/17/2022  Patient Name: Juliette Mcqueen  Date of admission: 3/12/2022  7:42 AM  Patient's age: 42 y.o., 1980  Admission Dx: DKA, type 2, not at goal [facility]) [E11.10]    Reason for Consult: possible HIT  Requesting Physician: Wolf Au MD    CHIEF COMPLAINT:    Chief Complaint   Patient presents with    Alcohol Intoxication       SUBJECTIVE:    Pt seen and examined  CT chest yesterday noted for acute pulmonary embolism  Patient started on Arixtra  Today platelets are better  No active bleeding noted  No fever chills  Heparin-Induced Platelet Antibody [[id number]]    Collected: 03/16/22 0919    Updated: 03/17/22 1223    Specimen Source: Blood     Heparin Induced Plt Ab 0.252 O.D. Comment:        O.D. Interpretation:    <=0.400     Negative    > 0.400     Positive           HISTORY OF PRESENT ILLNESS:    Juliette Mcqueen is a 42 y.o. female with a history of antiphospholipid antibody syndrome, chronically maintained on Arixtra, history of obesity and status post gastric bypass in 2013, who is admitted to the hospital on 3/12/2022  for alcohol intoxication. Patient reported to her heavy alcohol intake prior to her admission and brought in by her  with altered mental status and admitted for ICU management of her DKA. Patient has history of past medical history of anxiety, bipolar disorder, history of PE, hypertension, anemia and diabetes and suicidal ideation. Patient reportedly has not been compliant with her medications. On admission her ethanol level noted to be 404 and noted to have elevated liver enzymes. Her checks x-ray showed diffuse interstitial opacities and urine culture growing Klebsiella pneumoniae and Enterococcus faecalis. Patient has been evaluated by ID and currently receiving antibiotics. Her COVID-19 was negative. On admission her platelet count noted to be 278, hemoglobin 15.5 and WBC 8.1.   Yesterday dropped to 104, 265 and today, the platelet count is down to 34K  Her coags are normal.  Patient did not receive any IV heparin but did receive Lovenox 30 mg twice daily since admission. Hematology consulted for evaluation of her thrombocytopenia and questionable HIT. Patient reports she has been following with hematologist in the past for her history of antiphospholipid antibody syndrome and hypercoagulable state. She has been maintained on Arixtra chronically. She does not recall history of HIT or heparin allergy in the past.    Past Medical History:   has a past medical history of Alcohol abuse, Anxiety, Arthritis, Painting esophagus, Benzodiazepine overdose, Bipolar disorder, unspecified ([address] Bipolar I disorder, most recent episode depressed, severe without psychotic features ([address] Cellulitis, Chronic abdominal wound infection, Constipation, Depression, Drug overdose, intentional ([address] Genital herpes, unspecified, GERD (gastroesophageal reflux disease), History of pulmonary embolism, Hx of blood clots, Hypertension, Iron deficiency anemia, Isopropyl alcohol poisoning, Lumbosacral spondylosis without myelopathy, MDRO (multiple drug resistant organisms) resistance, Miscarriage, Morbid obesity ([address] MRSA (methicillin resistant staph aureus) culture positive, Overdose of benzodiazepine, Pernicious anemia, Primary hypercoagulable state ([address] Pulmonary embolism ([address] Suicidal behavior, Suicidal ideation, Suicide attempt ([address] SVT (supraventricular tachycardia) ([address] Toxic effect of ethanol, intentional self-harm ([address] and Type 2 diabetes mellitus, with long-term current use of insulin ([address] Past Surgical History:   has a past surgical history that includes Cholecystectomy; Liver Resection; hernia repair; Tonsillectomy; Endoscopy, colon, diagnostic; Abdominal hernia repair (2014); Abdominal hernia repair (11/3/14); Bariatric Surgery (2013); Dilation and curettage of uterus (2005);  Finger amputation (Left, 02/09/2015); lymph node biopsy (1990); yariel and bso (cervix removed) (2011); and IVC filter insertion. Medications:    Prior to Admission medications    Medication Sig Start Date End Date Taking? Authorizing Provider   ondansetron (ZOFRAN ODT) 4 MG disintegrating tablet Take 1 tablet by mouth every 8 hours as needed for Nausea 2/1/22   Linwood Menon DO   benazepril (LOTENSIN) 20 MG tablet TAKE 1 TABLET BY MOUTH DAILY 7/21/21   Gerald Echeverria MD   famotidine (PEPCID) 20 MG tablet TAKE 1 TABLET BY MOUTH TWICE DAILY 4/22/21   MD MISHA Saleem SOLOSTAR 300 UNIT/ML SOPN INJECT 70 UNITS INTO THE SKIN EVERY MORNING 3/30/21   Sandy Rogel MD   acyclovir (ZOVIRAX) 5 % ointment Apply topically every 3 hours x 10 days. 3/30/21   Gerald Echeverria MD   fondaparinux (ARIXTRA) 10 MG/0.8ML SOLN injection Inject 0.8 mLs into the skin daily 3/30/21   Gerald Echeverria MD   FLUoxetine (PROZAC) 20 MG capsule Take 1 capsule by mouth daily    Historical Provider, MD   cyanocobalamin 1000 MCG/ML injection Inject 1 mL into the muscle every 7 days 2/13/21   Gerald Echeverria MD   Syringe/Needle, Disp, (SYRINGE 3CC/25GX1\") 25G X 1\" 3 ML MISC To be used with B12 injections monthly 2/13/21   Gerald Echeverria MD   tiZANidine (ZANAFLEX) 4 MG tablet TAKE 1 TABLET BY MOUTH TWICE DAILY AS NEEDED FOR BACK PAIN 12/27/20   Gerald Echeverria MD   FARXIGA 10 MG tablet TAKE 1 TABLET BY MOUTH EVERY MORNING 11/2/20   Gerald Echeverria MD   pregabalin (LYRICA) 50 MG capsule Take 1 capsule by mouth 3 times daily for 7 days. 10/6/20 10/13/20  Sandy Rogel MD   sucralfate (CARAFATE) 1 GM tablet DISSOLVE 1 TABLET INTO 15 ML( 1 TABLESPOON) OF WATER AND DRINK BY MOUTH FOUR TIMES DAILY AS NEEDED FOR GERD 7/27/20   Gerald Echeverria MD   dilTIAZem (DILTIAZEM CD) 240 MG extended release capsule Take 240 mg by mouth daily Take one capsule by mouth daily.     Historical Provider, MD   pravastatin (PRAVACHOL) 40 MG tablet TAKE 1 TABLET(40 MG) BY MOUTH DAILY 6/26/20   Traci Tristan MD   valACYclovir (VALTREX) 500 MG tablet Take 1 tablet by mouth daily 6/8/20   Traci Tristan MD   ondansetron (ZOFRAN) 4 MG tablet Take 1 tablet by mouth 3 times daily as needed for Nausea or Vomiting 4/27/20   Ericka Thompson MD   acetaminophen (TYLENOL) 500 MG tablet Take 2 tablets by mouth every 8 hours as needed for Pain 2/19/20   Ayana Mijares,    LORazepam (ATIVAN) 2 MG tablet  12/18/19   Historical Provider, MD   GLUCAGEN HYPOKIT 1 MG SOLR injection  12/19/19   Historical Provider, MD   glucose monitoring kit (FREESTYLE) monitoring kit Testing twice a day. Please dispense according to patients insurance. 12/20/19   Traci Tristan MD   Insulin Pen Needle 31G X 5 MM MISC 1 each by Does not apply route daily 12/20/19   Traci Tristan MD   Lancets 30G MISC Testing twice a day. Please dispense according to patients insurance. 12/20/19   Traci Tristan MD   blood glucose monitor strips Testing twice a day. Please dispense according to patients insurance. 12/20/19   Traci Tristan MD   KLOR-CON M10 10 MEQ extended release tablet Take 2 tablets by mouth daily as needed (take with bumex) 12/20/19   Traci Tristan MD   bumetanide (BUMEX) 0.5 MG tablet TAKE 1 TABLET BY MOUTH DAILY AS NEEDED FOR LEG SWELLING 12/20/19   Traci Tristan MD   lamoTRIgine (LAMICTAL) 200 MG tablet Take 200 mg by mouth daily    Historical Provider, MD   amphetamine-dextroamphetamine (ADDERALL, 30MG,) 30 MG tablet Take 30 mg by mouth daily.     Historical Provider, MD   dicyclomine (BENTYL) 10 MG capsule Take 1 capsule by mouth every 6 hours as needed (cramps) 11/3/19   Loulou Campos MD   QUEtiapine (SEROQUEL) 100 MG tablet Take 50 mg by mouth nightly as needed     Historical Provider, MD   Multiple Vitamins-Minerals (THERAPEUTIC MULTIVITAMIN-MINERALS) tablet Take 1 tablet by mouth daily    Historical Provider, MD   buPROPion (WELLBUTRIN XL) 300 MG extended release tablet Take 300 mg by mouth every morning    Historical Provider, MD   vitamin D (CHOLECALCIFEROL) 1000 UNIT TABS tablet Take 10,000 Units by mouth daily 5 days a week    Historical Provider, MD     Current Facility-Administered Medications   Medication Dose Route Frequency Provider Last Rate Last Admin    insulin glargine (LANTUS) injection vial 50 Units  50 Units SubCUTAneous Daily Serena Ferris MD   50 Units at 03/17/22 0942    pravastatin (PRAVACHOL) tablet 40 mg  40 mg Oral Nightly Luda Santana MD   40 mg at 03/16/22 2058    dilTIAZem (CARDIZEM) tablet 30 mg  30 mg Oral 3 times per day Murphy Hurst MD   30 mg at 03/17/22 0556    chlordiazePOXIDE (LIBRIUM) capsule 25 mg  25 mg Oral TID Marcus Park MD   25 mg at 03/17/22 0940    pantoprazole (PROTONIX) 40 mg in sodium chloride (PF) 10 mL injection  40 mg IntraVENous Daily Toribio Rocha MD   40 mg at 03/17/22 0940    sodium chloride flush 0.9 % injection 10 mL  10 mL IntraVENous PRN Toribio Rocha MD        fondaparinux (ARIXTRA) injection 10 mg  10 mg SubCUTAneous Daily Michele Keys MD   10 mg at 03/17/22 1159    HYDROcodone-acetaminophen (NORCO) 5-325 MG per tablet 1 tablet  1 tablet Oral Q6H PRN BERNARDA De La Torre - CNP   1 tablet at 03/17/22 0951    levoFLOXacin (LEVAQUIN) 750 MG/150ML infusion 750 mg  750 mg IntraVENous Q24H Jennifer Fox MD   Stopped at 03/16/22 2048    insulin lispro (HUMALOG) injection vial 0-18 Units  0-18 Units SubCUTAneous TID WC Serena Ferris MD   6 Units at 03/17/22 1212    insulin lispro (HUMALOG) injection vial 0-9 Units  0-9 Units SubCUTAneous Nightly Serena Ferris MD   2 Units at 03/15/22 2044    perflutren lipid microspheres (DEFINITY) injection 1.65 mg  1.5 mL IntraVENous ONCE PRN Silvana Parish MD        miconazole (MICOTIN) 2 % powder   Topical BID Serena Ferris MD   Given at 03/17/22 1159    metoprolol (LOPRESSOR) injection 5 mg  5 mg IntraVENous Q6H PRN Serena Ferris MD   5 mg at 03/14/22 6324    lamoTRIgine (LAMICTAL) tablet 200 mg  200 mg Oral Daily Paula Brown MD   200 mg at 03/17/22 1159    buPROPion (WELLBUTRIN XL) extended release tablet 300 mg  300 mg Oral QAM Paula Brown MD   300 mg at 03/17/22 0940    FLUoxetine (PROZAC) capsule 20 mg  20 mg Oral Daily Paula Brown MD   20 mg at 03/17/22 0940    sodium chloride flush 0.9 % injection 10 mL  10 mL IntraVENous PRN Osmel No MD   10 mL at 03/14/22 0810    glucose (GLUTOSE) 40 % oral gel 15 g  15 g Oral PRN Nichole Magallanes, DO        dextrose 50 % IV solution  12.5 g IntraVENous PRN Nichole Magallanes, DO        glucagon (rDNA) injection 1 mg  1 mg IntraMUSCular PRN Nichole Magallanes, DO        dextrose 5 % solution  100 mL/hr IntraVENous PRN Nichole Magallanes, DO        potassium chloride 10 mEq/100 mL IVPB (Peripheral Line)  10 mEq IntraVENous PRN Rola Bernal MD   Stopped at 03/15/22 0635    magnesium sulfate 1000 mg in dextrose 5% 100 mL IVPB  1,000 mg IntraVENous PRN Joan Gupta MD   Stopped at 03/14/22 1559    sodium phosphate 10 mmol in dextrose 5 % 250 mL IVPB  10 mmol IntraVENous PRN Rola Bernal MD   Stopped at 03/16/22 1415    Or    sodium phosphate 15 mmol in dextrose 5 % 250 mL IVPB  15 mmol IntraVENous PRN Rola Bernal MD   Stopped at 03/15/22 1709    Or    sodium phosphate 20 mmol in dextrose 5 % 500 mL IVPB  20 mmol IntraVENous PRN Rola Bernal MD   Stopped at 03/13/22 1330    polyethylene glycol (GLYCOLAX) packet 17 g  17 g Oral Daily PRN Rola Bernal MD        sodium chloride flush 0.9 % injection 5-40 mL  5-40 mL IntraVENous 2 times per day Rola Bernal MD   10 mL at 03/16/22 0832    sodium chloride flush 0.9 % injection 5-40 mL  5-40 mL IntraVENous PRN Rola Bernal MD        0.9 % sodium chloride infusion  25 mL IntraVENous PRN Rola Bernal  mL/hr at 03/17/22 0252 25 mL at 03/17/22 0252    ondansetron (ZOFRAN-ODT) disintegrating tablet 4 mg  4 mg Oral Q8H PRN Rola Bernal MD        Or   Ladarius De Leon ondansetron (ZOFRAN) injection 4 mg  4 mg IntraVENous Q6H PRN Tk Marcos MD   4 mg at 03/13/22 1801    acetaminophen (TYLENOL) tablet 650 mg  650 mg Oral Q6H PRN Tk Marcos MD   650 mg at 03/17/22 0003    Or    acetaminophen (TYLENOL) suppository 650 mg  650 mg Rectal Q6H PRN Tk Marcos MD   650 mg at 03/14/22 0449    sodium chloride flush 0.9 % injection 5-40 mL  5-40 mL IntraVENous 2 times per day Ramana Bedoya MD   10 mL at 03/17/22 8183       Allergies:  Asa [aspirin], Betadine [povidone iodine], Celexa [citalopram hydrobromide], Citalopram, Lasix [furosemide], Macrobid [nitrofurantoin], Norco [hydrocodone-acetaminophen], Betadine [povidone iodine], Codeine, Lithium, Paroxetine, Paxil [paroxetine hcl], Tape [adhesive tape], and Tegretol [carbamazepine]    Social History:   reports that she has never smoked. She has never used smokeless tobacco. She reports that she does not drink alcohol and does not use drugs. Family History: family history includes Breast Cancer in her maternal aunt and maternal cousin; Cancer in her maternal uncle; Depression in her mother; Diabetes in her father; Heart Disease in her father; High Blood Pressure in her father and mother; High Cholesterol in her father and mother; Kidney Disease in her father. REVIEW OF SYSTEMS:    Constitutional: No fever or chills. No night sweats, no weight loss   Eyes: No eye discharge, double vision, or eye pain   HEENT: negative for sore mouth, sore throat, hoarseness and voice change   Respiratory: negative for cough , sputum, dyspnea, wheezing, hemoptysis, chest pain   Cardiovascular: negative for chest pain, dyspnea, palpitations, orthopnea, PND   Gastrointestinal: negative for nausea, vomiting, diarrhea, constipation, abdominal pain, Dysphagia, hematemesis and hematochezia   Genitourinary: negative for frequency, dysuria, nocturia, urinary incontinence, and hematuria   Integument: negative for rash, skin lesions, bruises. Hematologic/Lymphatic: negative for easy bruising, bleeding, lymphadenopathy, or petechiae   Endocrine: negative for heat or cold intolerance,weight changes, change in bowel habits and hair loss   Musculoskeletal: negative for myalgias, arthralgias, pain, joint swelling,and bone pain   Neurological: negative for headaches, dizziness, seizures, weakness, numbness    PHYSICAL EXAM:      /65   Pulse 110   Temp 98.1 °F (36.7 °C) (Oral)   Resp 20   Ht 5' 8\" (1.727 m)   Wt 280 lb (127 kg)   SpO2 98%   BMI 42.57 kg/m²    Temp (24hrs), Av.4 °F (37.4 °C), Min:97.9 °F (36.6 °C), Max:100.6 °F (38.1 °C)    General appearance - well appearing, no in pain or distress   Mental status - alert and cooperative   Eyes - pupils equal and reactive, extraocular eye movements intact   Ears - bilateral TM's and external ear canals normal   Mouth - mucous membranes moist, pharynx normal without lesions   Neck - supple, no significant adenopathy   Lymphatics - no palpable lymphadenopathy, no hepatosplenomegaly   Chest - clear to auscultation, no wheezes, rales or rhonchi, symmetric air entry   Heart - normal rate, regular rhythm, normal S1, S2, no murmurs  Abdomen - soft, nontender, nondistended, no masses or organomegaly   Neurological - alert, oriented, normal speech, no focal findings or movement disorder noted   Musculoskeletal - no joint tenderness, deformity or swelling   Extremities - peripheral pulses normal, no pedal edema, no clubbing or cyanosis   Skin - normal coloration and turgor, no rashes, no suspicious skin lesions noted ,    DATA:    Labs:   CBC:   Recent Labs     22  0356 22  1627 22  2150 22  0450   WBC 5.1  --   --  4.6   HGB 11.1*  --   --  9.7*   HCT 33.4*  --   --  29.7*   PLT 34*   < > 52* 69*    < > = values in this interval not displayed.      BMP:   Recent Labs     22  0356 22  0450   * 137   K 3.9 3.3*   CO2 21 21   BUN 7 6   CREATININE 0.68 0.58 LABGLOM >60 >60   GLUCOSE 239* 116*     PT/INR:   No results for input(s): PROTIME, INR in the last 72 hours. IMAGING DATA:  CT CHEST PULMONARY EMBOLISM W CONTRAST   Final Result   Filling defects within the pulmonary arterial branches within the left lower   lobe and right lower lobe, concerning for mild burden of pulmonary embolism. No cardiac strain      Extensive consolidative changes within the left lower lobe and other   scattered areas of mild focal areas of consolidation within bilateral upper   lobes. Findings are concerning for multifocal pneumonia. For detailed description of visualized abdominal findings please refer to the   abdominal CT of 03/13/2022. Luis Littlejohn RECOMMENDATIONS:   Unavailable         XR CHEST (2 VW)   Final Result   Increasing infiltration in the left lung base, in keeping with pneumonia. RECOMMENDATION:         CT ABDOMEN PELVIS W IV CONTRAST Additional Contrast? Radiologist Recommendation   Final Result   1. Multifocal pneumonia, most pronounced in the left lower lobe. 2. Inflammatory changes surrounding the pancreas concerning for acute   pancreatitis. No focal fluid collection. 3. Fatty liver. 4. Anasarca. 5. Presacral edema without a discrete abscess. 6. Inflammatory changes surround the bladder, concerning for underlying   infection. 7. No bowel obstruction. XR CHEST PORTABLE   Final Result   Intervally placed right upper extremity PICC distal tip projects over the   right atrium. No discrete pneumothorax. Left basilar opacities persist and appear mildly worsened, concerning for   developing infection. Aspiration may have a similar appearance. Low lung volumes. CT Head WO Contrast   Final Result   No acute intracranial abnormality. XR CHEST PORTABLE   Final Result   Low lung volumes.   Diffuse interstitial opacities in the perihilar areas with   left perihilar and lower lobe alveolar type opacities consistent with multifocal airspace disease. Primary Problem  Multifocal pneumonia    Active Hospital Problems    Diagnosis Date Noted    [facility] ([facility] [I26.99] 03/17/2022    Acute pancreatitis [K85.90] 03/14/2022    Multifocal pneumonia [J18.9] 90/51/4565    Alcoholic hepatitis [M33.72] 03/14/2022    GAIL (acute kidney injury) ([facility] [N17.9] 03/13/2022    DKA, type 2, not at goal [facility]) [E11.10] 03/12/2022    Alcohol intoxication ([facility] [F10.929] 12/16/2015       IMPRESSION:   1. Altered mental status   2. alcohol intoxication  3. Multifocal pneumonia  4. Diabetic ketoacidosis  5. Urinary tract infection  6. Thrombocytopenia, overall given clinical picture, timeline suspicion for HIT is very low. 7. History of antiphospholipid antibody syndrome: Patient has been on chronic anticoagulation with Arixtra and therefore is a hypercoagulable state and would recommend resuming anticoagulation soon with Arixtra  8. Acute pancreatitis  9. History of gastric bypass      RECOMMENDATIONS:  1. I reviewed [facility], imaging studies, discussed possible diagnosis and treatment recommendations  2. Heparin antibody test negative  3. No evidence of DIC or TTP  4. Acute PE noted on recent CT  5. Arixtra restarted yesterday  6. Given her history of antiphospholipid antibody syndrome, patient is highly prothrombotic and has been following with hematologist in the past she has been maintained on Arixtra at home  7. Other management as per primary team  8. Monitor counts closely  9. We will follow    Discussed with patient and Nurse. Thank you for asking us to see this patient. Jason Nuñez MD  Hematologist/Medical Oncologist    Cell: [phone]    This note is created with the assistance of a speech recognition program.  While intending to generate a document that actually reflects the content of the visit, the document can still have some errors including those of syntax and sound a like substitutions which may escape proof reading. It such instances, actual meaning can be extrapolated by contextual diversion.

## 2022-03-17 NOTE — PROGRESS NOTES
Be Guy MD/Trevor Calloway MD/ Woody Luo MD/Dr Peter Silverman APRN AGACARLP-BC, NP-C      Bailey Lisa MENCHACA NP-C     Maxine Dance APRN NP-C                                           Pulmonary Progress Note    Patient - Diana Reyna   Age - 42 y.o.   - 1980  MRN - [id number]  Acct # - [de-identified]  Date of Admission - 3/12/2022  7:42 AM    Consulting Service/Physician:       Primary Care Physician: Jerry Rodriguez    SUBJECTIVE:     Chief Complaint:   Chief Complaint   Patient presents with    Alcohol Intoxication     Subjective:    Lloyd Singh tells me she is feeling okay today. She reports some mild shortness of breath. She is currently on room air. She was restarted on her Arixtra. She tells me she has a longstanding history of PE with failure on oral therapy. She states this is dating back at least 10 years. She has been maintained at home on Arixtra prior to her arrival.  CTA did show bilateral lower lobe PEs. White blood cell count today is 4.6. Platelets are slightly improved at 69. She continues on IV Levaquin. MRSA is negative. VITALS  /65   Pulse 110   Temp 98.1 °F (36.7 °C) (Oral)   Resp 20   Ht 5' 8\" (1.727 m)   Wt 280 lb (127 kg)   SpO2 98%   BMI 42.57 kg/m²   Wt Readings from Last 3 Encounters:   22 280 lb (127 kg)   22 280 lb (127 kg)   21 280 lb (127 kg)     I/O (24 Hours)    Intake/Output Summary (Last 24 hours) at 3/17/2022 1147  Last data filed at 3/17/2022 1055  Gross per 24 hour   Intake --   Output 6175 ml   Net -6175 ml     Ventilator:      Exam:   Physical Exam   Constitutional: Obese female sitting up in bed on room air no distress  HENT: Unremarkable  Head: Normocephalic and atraumatic. Eyes: EOM are normal. Pupils are equal, round, and reactive to light. Neck: Neck supple. Cardiovascular:  Regular rate and rhythm. Normal heart tones. No JVD. Pulmonary/Chest: Slightly diminished in bases with some fine rales in right base, on room air, respirations even and unlabored  Abdominal: Soft. Bowel sounds are normal.   Musculoskeletal: Normal range of motion. Neurological: Patient is alert and oriented to person, place, and time. Skin: Skin is warm and dry. No rash noted.    Extremities: No edema or discoloration  Infusions:      dextrose      sodium chloride 25 mL (03/17/22 0252)     Meds:     Current Facility-Administered Medications:     insulin glargine (LANTUS) injection vial 50 Units, 50 Units, SubCUTAneous, Daily, Ana Banuelos MD, 50 Units at 03/17/22 0942    pravastatin (PRAVACHOL) tablet 40 mg, 40 mg, Oral, Nightly, Justice Santana MD, 40 mg at 03/16/22 2058    dilTIAZem (CARDIZEM) tablet 30 mg, 30 mg, Oral, 3 times per day, Jonna Houston MD, 30 mg at 03/17/22 0556    chlordiazePOXIDE (LIBRIUM) capsule 25 mg, 25 mg, Oral, TID, Juan Dial MD, 25 mg at 03/17/22 0940    pantoprazole (PROTONIX) 40 mg in sodium chloride (PF) 10 mL injection, 40 mg, IntraVENous, Daily, Kaykay Castano MD, 40 mg at 03/17/22 0940    sodium chloride flush 0.9 % injection 10 mL, 10 mL, IntraVENous, PRN, Kaykay Castano MD    fondaparinux (ARIXTRA) injection 10 mg, 10 mg, SubCUTAneous, Daily, Thuan Lisa MD, 10 mg at 03/16/22 1839    HYDROcodone-acetaminophen (NORCO) 5-325 MG per tablet 1 tablet, 1 tablet, Oral, Q6H PRN, Raúl Marti, APRN - CNP, 1 tablet at 03/17/22 0951    levoFLOXacin (LEVAQUIN) 750 MG/150ML infusion 750 mg, 750 mg, IntraVENous, Q24H, Emiliano Zacarias MD, Stopped at 03/16/22 2048    insulin lispro (HUMALOG) injection vial 0-18 Units, 0-18 Units, SubCUTAneous, TID WC, Ana Banuelos MD, 3 Units at 03/16/22 1747    insulin lispro (HUMALOG) injection vial 0-9 Units, 0-9 Units, SubCUTAneous, Nightly, Ana Banuelos MD, 2 Units at 03/15/22 2044    perflutren lipid microspheres (DEFINITY) injection 1.65 mg, 1.5 mL, IntraVENous, ONCE PRN, Ottoniel Mango Willett MD    miconazole (MICOTIN) 2 % powder, , Topical, BID, Mauricio Adkins MD, Given at 03/16/22 2106    metoprolol (LOPRESSOR) injection 5 mg, 5 mg, IntraVENous, Q6H PRN, Mauricio Adkins MD, 5 mg at 03/14/22 1630    lamoTRIgine (LAMICTAL) tablet 200 mg, 200 mg, Oral, Daily, Bernabe Stein MD, 200 mg at 03/16/22 0831    buPROPion (WELLBUTRIN XL) extended release tablet 300 mg, 300 mg, Oral, QAM, Bernabe Stein MD, 300 mg at 03/17/22 0940    FLUoxetine (PROZAC) capsule 20 mg, 20 mg, Oral, Daily, Bernabe Stein MD, 20 mg at 03/17/22 0940    sodium chloride flush 0.9 % injection 10 mL, 10 mL, IntraVENous, PRN, Mauricio Adkins MD, 10 mL at 03/14/22 0810    glucose (GLUTOSE) 40 % oral gel 15 g, 15 g, Oral, PRN, Jasmin Shored, DO    dextrose 50 % IV solution, 12.5 g, IntraVENous, PRN, Brandanell Albinir Crystal, DO    glucagon (rDNA) injection 1 mg, 1 mg, IntraMUSCular, PRN, Jasmin Talamantesir Elpidio, DO    dextrose 5 % solution, 100 mL/hr, IntraVENous, PRN, Jasmin Talamantesir Crystal, DO    potassium chloride 10 mEq/100 mL IVPB (Peripheral Line), 10 mEq, IntraVENous, PRN, Sami Mora MD, Stopped at 03/15/22 0635    magnesium sulfate 1000 mg in dextrose 5% 100 mL IVPB, 1,000 mg, IntraVENous, PRN, Lucy Paulino MD, Stopped at 03/14/22 1559    sodium phosphate 10 mmol in dextrose 5 % 250 mL IVPB, 10 mmol, IntraVENous, PRN, Stopped at 03/16/22 1415 **OR** sodium phosphate 15 mmol in dextrose 5 % 250 mL IVPB, 15 mmol, IntraVENous, PRN, Stopped at 03/15/22 1709 **OR** sodium phosphate 20 mmol in dextrose 5 % 500 mL IVPB, 20 mmol, IntraVENous, PRN, Sami Mora MD, Stopped at 03/13/22 1330    polyethylene glycol (GLYCOLAX) packet 17 g, 17 g, Oral, Daily PRN, Sami Mora MD    sodium chloride flush 0.9 % injection 5-40 mL, 5-40 mL, IntraVENous, 2 times per day, Sami Mora MD, 10 mL at 03/16/22 0832    sodium chloride flush 0.9 % injection 5-40 mL, 5-40 mL, IntraVENous, PRN, Sami Mora MD    0.9 % sodium chloride infusion, 25 mL, IntraVENous, PRN, Rick Patel MD, Last Rate: 100 mL/hr at 03/17/22 0252, 25 mL at 03/17/22 0252    ondansetron (ZOFRAN-ODT) disintegrating tablet 4 mg, 4 mg, Oral, Q8H PRN **OR** ondansetron (ZOFRAN) injection 4 mg, 4 mg, IntraVENous, Q6H PRN, Rick Patel MD, 4 mg at 03/13/22 1801    acetaminophen (TYLENOL) tablet 650 mg, 650 mg, Oral, Q6H PRN, 650 mg at 03/17/22 0003 **OR** acetaminophen (TYLENOL) suppository 650 mg, 650 mg, Rectal, Q6H PRN, Rick Patel MD, 650 mg at 03/14/22 0449    sodium chloride flush 0.9 % injection 5-40 mL, 5-40 mL, IntraVENous, 2 times per day, Paulie Steward MD, 10 mL at 03/17/22 6621    Lab Results:     Lab Results   Component Value Date    WBC 4.6 03/17/2022    HGB 9.7 (L) 03/17/2022    HCT 29.7 (L) 03/17/2022    MCV 91.5 03/17/2022    PLT 69 (L) 03/17/2022     Lab Results   Component Value Date    CALCIUM 6.6 (L) 03/17/2022     03/17/2022    K 3.3 (L) 03/17/2022    CO2 21 03/17/2022     (H) 03/17/2022    BUN 6 03/17/2022    CREATININE 0.58 03/17/2022       Lab Results   Component Value Date    INR 1.2 03/13/2022    PROTIME 14.8 (H) 03/13/2022       Radiology:         ASSESSMENT:     Multifocal Community Acquired Pneumonia  Acute alcohol intoxication/ETOH abuse  Severe diabetic keto/metabolic acidosis (anion gap, lactic acid), resolved  History of hypercoagulable state, antiphospholipid antibody, maintained chronically on Arixtra  Bilateral lower lobe PE  Alcoholic Hepatitis  Pancreatitis  Morbid obesity, status post bariatric surgery 2013  SAMIR  Urinary tract infection, culture with Klebsiella and Enterococcus  Thrombocytopenia, improving  GAIL, resolved    PLAN:   1. Continue IV Levaquin  2. Continue Arixtra  3. Monitor platelets  4. Monitor on room air, keep pulse ox above 90%  5. We will add on Mucinex  6. Follow-up repeat limited echo  7. Supportive care  8. Chest x-ray tomorrow  9.  Discussed with nursing at bedside      Electronically signed by Myesha Bey, BERNARDA - CNP on 03/17/22     This progress note was completed using a voice transcription system. Every effort was made to ensure accuracy. However, inadvertent computerized transcription errors may be present.     Maralee Koyanagi, NP-C, MSN  [facility] Pulmonary, Critical Care & Sleep

## 2022-03-17 NOTE — PROGRESS NOTES
[facility] CARDIOLOGY Progress Note    3/17/2022 8:23 AM      Subjective:  Ms. Tin Lockett of being tired and hard to breathe. No anginal chest pain. Had abnormal CT angio of chest which revealed pulmonary emboli overnight. Patient is aware. Review of systems:  No fever or chills. No cough.               LABS:     Recent Results (from the past 24 hour(s))   POC Glucose Fingerstick    Collection Time: 03/16/22  8:26 AM   Result Value Ref Range    POC Glucose 259 (H) 65 - 105 mg/dL   POC Glucose Fingerstick    Collection Time: 03/16/22 11:01 AM   Result Value Ref Range    POC Glucose 277 (H) 65 - 105 mg/dL   D-Dimer, Quantitative    Collection Time: 03/16/22  1:31 PM   Result Value Ref Range    D-Dimer, Quant 1.32 (H) 0.00 - 0.59 mg/L FEU   Lactate Dehydrogenase    Collection Time: 03/16/22  4:27 PM   Result Value Ref Range     (H) 135 - 214 U/L   Haptoglobin    Collection Time: 03/16/22  4:27 PM   Result Value Ref Range    Haptoglobin 327 (H) 30 - 200 mg/dL   Fibrinogen    Collection Time: 03/16/22  4:27 PM   Result Value Ref Range    Fibrinogen 623 (H) 210 - 530 mg/dL   Vitamin B12 & Folate    Collection Time: 03/16/22  4:27 PM   Result Value Ref Range    Vitamin B-12 >2000 (H) 232 - 1245 pg/mL    Folate >20.0 >4.8 ng/mL   Iron and TIBC    Collection Time: 03/16/22  4:27 PM   Result Value Ref Range    Iron 19 (L) 37 - 145 ug/dL    TIBC 82 (L) 250 - 450 ug/dL    Iron Saturation 23 20 - 55 %    UIBC 63 (L) 112 - 347 ug/dL   Ferritin    Collection Time: 03/16/22  4:27 PM   Result Value Ref Range    Ferritin 781 (H) 13 - 150 ug/L   Reticulocytes    Collection Time: 03/16/22  4:27 PM   Result Value Ref Range    Retic % 0.3 (L) 0.5 - 2.0 %    Absolute Retic # 0.011 (L) 0.0245 - 0.098 M/uL   Platelet Count    Collection Time: 03/16/22  4:27 PM   Result Value Ref Range    Platelets 37 (L) 549 - 450 k/uL   POC Glucose Fingerstick    Collection Time: 03/16/22  4:28 PM   Result Value Ref Range POC Glucose 147 (H) 65 - 105 mg/dL   POC Glucose Fingerstick    Collection Time: 22  9:05 PM   Result Value Ref Range    POC Glucose 105 65 - 105 mg/dL   Platelet Count    Collection Time: 22  9:50 PM   Result Value Ref Range    Platelets 52 (L) 409 - 450 k/uL   CBC with Auto Differential    Collection Time: 22  4:50 AM   Result Value Ref Range    WBC 4.6 3.5 - 11.0 k/uL    RBC 3.24 (L) 4.0 - 5.2 m/uL    Hemoglobin 9.7 (L) 12.0 - 16.0 g/dL    Hematocrit 29.7 (L) 36 - 46 %    MCV 91.5 80 - 100 fL    MCH 29.7 26 - 34 pg    MCHC 32.5 31 - 37 g/dL    RDW 14.4 11.5 - 14.9 %    Platelets 69 (L) 990 - 450 k/uL    MPV 10.2 6.0 - 12.0 fL    Seg Neutrophils 78 (H) 36 - 66 %    Lymphocytes 7 (L) 24 - 44 %    Monocytes 12 (H) 1 - 7 %    Eosinophils % 2 0 - 4 %    Basophils 1 0 - 2 %    nRBC 1 (H) 0 per 100 WBC    Segs Absolute 3.54 1.3 - 9.1 k/uL    Absolute Lymph # 0.32 (L) 1.0 - 4.8 k/uL    Absolute Mono # 0.55 0.1 - 1.3 k/uL    Absolute Eos # 0.09 0.0 - 0.4 k/uL    Basophils Absolute 0.05 0.0 - 0.2 k/uL    Morphology TOXIC GRANULATION PRESENT    Basic Metabolic Panel    Collection Time: 22  4:50 AM   Result Value Ref Range    Glucose 116 (H) 70 - 99 mg/dL    BUN 6 6 - 20 mg/dL    CREATININE 0.58 0.50 - 0.90 mg/dL    Calcium 6.6 (L) 8.6 - 10.4 mg/dL    Sodium 137 135 - 144 mmol/L    Potassium 3.3 (L) 3.7 - 5.3 mmol/L    Chloride 108 (H) 98 - 107 mmol/L    CO2 21 20 - 31 mmol/L    Anion Gap 8 (L) 9 - 17 mmol/L    GFR Non-African American >60 >60 mL/min    GFR African American >60 >60 mL/min    GFR Comment         Magnesium    Collection Time: 22  4:50 AM   Result Value Ref Range    Magnesium 1.7 1.6 - 2.6 mg/dL   Phosphorus    Collection Time: 22  4:50 AM   Result Value Ref Range    Phosphorus 2.3 (L) 2.6 - 4.5 mg/dL   POC Glucose Fingerstick    Collection Time: 22  8:11 AM   Result Value Ref Range    POC Glucose 128 (H) 65 - 105 mg/dL       Pulse Ox:  SpO2  Av.4 %  Min: 91 % Max: 98 %    Supplemental O2: O2 Flow Rate (L/min): 3 L/min     Current Meds:    insulin glargine  50 Units SubCUTAneous Daily    pravastatin  40 mg Oral Nightly    dilTIAZem  30 mg Oral 3 times per day    chlordiazePOXIDE  25 mg Oral TID    pantoprazole (PROTONIX) 40 mg injection  40 mg IntraVENous Daily    fondaparinux  10 mg SubCUTAneous Daily    levofloxacin  750 mg IntraVENous Q24H    insulin lispro  0-18 Units SubCUTAneous TID WC    insulin lispro  0-9 Units SubCUTAneous Nightly    miconazole   Topical BID    lamoTRIgine  200 mg Oral Daily    buPROPion  300 mg Oral QAM    FLUoxetine  20 mg Oral Daily    sodium chloride flush  5-40 mL IntraVENous 2 times per day    sodium chloride flush  5-40 mL IntraVENous 2 times per day         Continuous Infusions:    dextrose      sodium chloride 25 mL (03/17/22 0252)              VITAL SIGNS:    BP (!) 145/77   Pulse 108   Temp 99.4 °F (37.4 °C) (Axillary)   Resp 24   Ht 5' 8\" (1.727 m)   Wt 280 lb (127 kg)   SpO2 98%   BMI 42.57 kg/m²  3 L/min      Admit Weight:  280 lb (127 kg)    Last 3 weights: Wt Readings from Last 3 Encounters:   03/12/22 280 lb (127 kg)   02/01/22 280 lb (127 kg)   12/31/21 280 lb (127 kg)       BMI: Body mass index is 42.57 kg/m². INPUT/OUTPUT:          Intake/Output Summary (Last 24 hours) at 3/17/2022 0823  Last data filed at 3/17/2022 0301  Gross per 24 hour   Intake --   Output 5100 ml   Net -5100 ml         Telemetry shows sinus tachycardia. EXAM:     General appearance: awake, alert. Pleasant. Resting in bed and eating breakfast.  Neck: No JVD or thyromegaly  Chest: clear bilaterally. No tenderness. No rhonchi or wheezing. Cardiac:  Tachycardic. Regular rate and rhythm. No significant murmur or gallop or rubs. Abdomen: soft, non-tender. Extremities: no cyanosis, no clubbing, no calf tenderness, no leg edema. Pulses: intact bilateral radial pulses. Skin:  warm and dry.   Neuro:  Able to move all 4 extremities. No gross focal deficits. 2 D ECHOCARDIOGRAM 3/14/2022:     Summary   Normal left ventricle size and function with an estimated EF > 55%. No segmental wall motion abnormalities seen. Mild left ventricular hypertrophy. Normal right ventricular size and function. No significant valvular regurgitation or stenosis seen. No significant pericardial effusion is seen.           CT Angio of chest 3/16/2022:    Impression   Filling defects within the pulmonary arterial branches within the left lower   lobe and right lower lobe, concerning for mild burden of pulmonary embolism. No cardiac strain       Extensive consolidative changes within the left lower lobe and other   scattered areas of mild focal areas of consolidation within bilateral upper   lobes.  Findings are concerning for multifocal pneumonia.               ASSESSMENT:     Paroxysmal atrial fibrillation /flutter with RVR.   Suspect supraventricular tachycardia reported is atrial flutter with 2:1 AV conduction  03/14/2022 AM upon review of rhythm strips. Currently sinus tachycardia. Low CHADS2 Vasc risk score.       Sinus tachycardia.     Normal LV systolic function. Pulmonary emboli on Abnormal CT Angio of chest  March 2022. Management per pulmonologist who is following the patient. Thrombocytopenia.     Diabetic ketoacidosis - Improved.     Essential hypertension.       Hyperlipidemia.     History of alcohol abuse.       Other problems as charted. REC/PLAN:    Transferred out of ICU to step-down telemetric monitoring bed. Reviewed abnormal CT angio of chest results revealing pulmonary emboli. Further management of pulmonary embolism per pulmonologist.      Continue on other current cardiac medications. Will repeat a limited 2D echocardiogram to rule out any pulmonary hypertension or RV strain , given abnormal CT angio of chest revealing pulmonary emboli. Thanks.       Electronically signed by Elaine Landon Josephine Alexander MD, [facility] [place]        PLEASE NOTE:  This progress note was completed using a voice transcription system. Every effort was made to ensure accuracy. However, inadvertent computerized transcription errors may be present.

## 2022-03-18 ENCOUNTER — APPOINTMENT (OUTPATIENT)
Dept: VASCULAR LAB | Age: 42
DRG: 871 | End: 2022-03-18
Payer: MEDICARE

## 2022-03-18 ENCOUNTER — APPOINTMENT (OUTPATIENT)
Dept: GENERAL RADIOLOGY | Age: 42
DRG: 871 | End: 2022-03-18
Payer: MEDICARE

## 2022-03-18 PROBLEM — E11.10 DKA, TYPE 2, NOT AT GOAL (HCC): Status: RESOLVED | Noted: 2022-03-12 | Resolved: 2022-03-18

## 2022-03-18 PROBLEM — N17.9 AKI (ACUTE KIDNEY INJURY) (HCC): Status: RESOLVED | Noted: 2022-03-13 | Resolved: 2022-03-18

## 2022-03-18 LAB
ABSOLUTE EOS #: 0.06 K/UL (ref 0–0.4)
ABSOLUTE LYMPH #: 0.57 K/UL (ref 1–4.8)
ABSOLUTE MONO #: 1.51 K/UL (ref 0.1–1.3)
ANION GAP SERPL CALCULATED.3IONS-SCNC: 9 MMOL/L (ref 9–17)
BASOPHILS # BLD: 0 % (ref 0–2)
BASOPHILS ABSOLUTE: 0 K/UL (ref 0–0.2)
BUN BLDV-MCNC: 6 MG/DL (ref 6–20)
CALCIUM SERPL-MCNC: 7.5 MG/DL (ref 8.6–10.4)
CHLORIDE BLD-SCNC: 105 MMOL/L (ref 98–107)
CO2: 22 MMOL/L (ref 20–31)
CREAT SERPL-MCNC: 0.57 MG/DL (ref 0.5–0.9)
CULTURE: NORMAL
EOSINOPHILS RELATIVE PERCENT: 1 % (ref 0–4)
GFR AFRICAN AMERICAN: >60 ML/MIN
GFR NON-AFRICAN AMERICAN: >60 ML/MIN
GFR SERPL CREATININE-BSD FRML MDRD: ABNORMAL ML/MIN/{1.73_M2}
GLUCOSE BLD-MCNC: 109 MG/DL (ref 65–105)
GLUCOSE BLD-MCNC: 118 MG/DL (ref 65–105)
GLUCOSE BLD-MCNC: 147 MG/DL (ref 65–105)
GLUCOSE BLD-MCNC: 177 MG/DL (ref 70–99)
GLUCOSE BLD-MCNC: 86 MG/DL (ref 65–105)
HCT VFR BLD CALC: 31.2 % (ref 36–46)
HEMOGLOBIN: 10.4 G/DL (ref 12–16)
LYMPHOCYTES # BLD: 9 % (ref 24–44)
MAGNESIUM: 2.1 MG/DL (ref 1.6–2.6)
MCH RBC QN AUTO: 29.9 PG (ref 26–34)
MCHC RBC AUTO-ENTMCNC: 33.2 G/DL (ref 31–37)
MCV RBC AUTO: 90.2 FL (ref 80–100)
MONOCYTES # BLD: 24 % (ref 1–7)
MORPHOLOGY: ABNORMAL
MORPHOLOGY: ABNORMAL
PATHOLOGIST REVIEW: NORMAL
PDW BLD-RTO: 14.6 % (ref 11.5–14.9)
PHOSPHORUS: 3.8 MG/DL (ref 2.6–4.5)
PLATELET # BLD: 107 K/UL (ref 150–450)
PMV BLD AUTO: 10.5 FL (ref 6–12)
POTASSIUM SERPL-SCNC: 3.5 MMOL/L (ref 3.7–5.3)
RBC # BLD: 3.46 M/UL (ref 4–5.2)
REASON FOR REJECTION: NORMAL
REASON FOR REJECTION: NORMAL
SEG NEUTROPHILS: 66 % (ref 36–66)
SEGMENTED NEUTROPHILS ABSOLUTE COUNT: 4.16 K/UL (ref 1.3–9.1)
SODIUM BLD-SCNC: 136 MMOL/L (ref 135–144)
SPECIMEN DESCRIPTION: NORMAL
WBC # BLD: 6.3 K/UL (ref 3.5–11)
ZZ NTE CLEAN UP: ORDERED TEST: NORMAL
ZZ NTE CLEAN UP: ORDERED TEST: NORMAL
ZZ NTE WITH NAME CLEAN UP: SPECIMEN SOURCE: NORMAL
ZZ NTE WITH NAME CLEAN UP: SPECIMEN SOURCE: NORMAL

## 2022-03-18 PROCEDURE — 97116 GAIT TRAINING THERAPY: CPT

## 2022-03-18 PROCEDURE — A4216 STERILE WATER/SALINE, 10 ML: HCPCS | Performed by: INTERNAL MEDICINE

## 2022-03-18 PROCEDURE — 6370000000 HC RX 637 (ALT 250 FOR IP): Performed by: INTERNAL MEDICINE

## 2022-03-18 PROCEDURE — 99232 SBSQ HOSP IP/OBS MODERATE 35: CPT | Performed by: INTERNAL MEDICINE

## 2022-03-18 PROCEDURE — 82947 ASSAY GLUCOSE BLOOD QUANT: CPT

## 2022-03-18 PROCEDURE — 85025 COMPLETE CBC W/AUTO DIFF WBC: CPT

## 2022-03-18 PROCEDURE — 6360000002 HC RX W HCPCS: Performed by: INTERNAL MEDICINE

## 2022-03-18 PROCEDURE — 93970 EXTREMITY STUDY: CPT

## 2022-03-18 PROCEDURE — 6370000000 HC RX 637 (ALT 250 FOR IP)

## 2022-03-18 PROCEDURE — 6370000000 HC RX 637 (ALT 250 FOR IP): Performed by: STUDENT IN AN ORGANIZED HEALTH CARE EDUCATION/TRAINING PROGRAM

## 2022-03-18 PROCEDURE — C9113 INJ PANTOPRAZOLE SODIUM, VIA: HCPCS | Performed by: INTERNAL MEDICINE

## 2022-03-18 PROCEDURE — 80048 BASIC METABOLIC PNL TOTAL CA: CPT

## 2022-03-18 PROCEDURE — 36415 COLL VENOUS BLD VENIPUNCTURE: CPT

## 2022-03-18 PROCEDURE — 99233 SBSQ HOSP IP/OBS HIGH 50: CPT | Performed by: INTERNAL MEDICINE

## 2022-03-18 PROCEDURE — 2580000003 HC RX 258: Performed by: INTERNAL MEDICINE

## 2022-03-18 PROCEDURE — 84100 ASSAY OF PHOSPHORUS: CPT

## 2022-03-18 PROCEDURE — 71046 X-RAY EXAM CHEST 2 VIEWS: CPT

## 2022-03-18 PROCEDURE — 6370000000 HC RX 637 (ALT 250 FOR IP): Performed by: NURSE PRACTITIONER

## 2022-03-18 PROCEDURE — 83735 ASSAY OF MAGNESIUM: CPT

## 2022-03-18 PROCEDURE — 2060000000 HC ICU INTERMEDIATE R&B

## 2022-03-18 RX ORDER — LEVOFLOXACIN 750 MG/1
750 TABLET ORAL DAILY
Status: DISCONTINUED | OUTPATIENT
Start: 2022-03-18 | End: 2022-03-19 | Stop reason: HOSPADM

## 2022-03-18 RX ADMIN — DILTIAZEM HYDROCHLORIDE 30 MG: 30 TABLET, FILM COATED ORAL at 21:43

## 2022-03-18 RX ADMIN — GUAIFENESIN 600 MG: 600 TABLET, EXTENDED RELEASE ORAL at 21:44

## 2022-03-18 RX ADMIN — ANTI-FUNGAL POWDER MICONAZOLE NITRATE TALC FREE: 1.42 POWDER TOPICAL at 09:45

## 2022-03-18 RX ADMIN — HYDROCODONE BITARTRATE AND ACETAMINOPHEN 1 TABLET: 5; 325 TABLET ORAL at 17:32

## 2022-03-18 RX ADMIN — PRAVASTATIN SODIUM 40 MG: 40 TABLET ORAL at 21:44

## 2022-03-18 RX ADMIN — INSULIN LISPRO 3 UNITS: 100 INJECTION, SOLUTION INTRAVENOUS; SUBCUTANEOUS at 17:34

## 2022-03-18 RX ADMIN — DILTIAZEM HYDROCHLORIDE 30 MG: 30 TABLET, FILM COATED ORAL at 14:18

## 2022-03-18 RX ADMIN — FLUOXETINE HYDROCHLORIDE 20 MG: 20 CAPSULE ORAL at 09:27

## 2022-03-18 RX ADMIN — HYDROCODONE BITARTRATE AND ACETAMINOPHEN 1 TABLET: 5; 325 TABLET ORAL at 04:20

## 2022-03-18 RX ADMIN — SODIUM CHLORIDE, PRESERVATIVE FREE 10 ML: 5 INJECTION INTRAVENOUS at 09:45

## 2022-03-18 RX ADMIN — BUPROPION HYDROCHLORIDE 300 MG: 300 TABLET, FILM COATED, EXTENDED RELEASE ORAL at 09:27

## 2022-03-18 RX ADMIN — SODIUM CHLORIDE 40 MG: 9 INJECTION INTRAMUSCULAR; INTRAVENOUS; SUBCUTANEOUS at 09:26

## 2022-03-18 RX ADMIN — INSULIN GLARGINE 50 UNITS: 100 INJECTION, SOLUTION SUBCUTANEOUS at 09:27

## 2022-03-18 RX ADMIN — FONDAPARINUX SODIUM 10 MG: 10 INJECTION, SOLUTION SUBCUTANEOUS at 14:13

## 2022-03-18 RX ADMIN — POTASSIUM CHLORIDE 40 MEQ: 20 TABLET, EXTENDED RELEASE ORAL at 14:12

## 2022-03-18 RX ADMIN — ACETAMINOPHEN 650 MG: 325 TABLET, FILM COATED ORAL at 21:44

## 2022-03-18 RX ADMIN — LAMOTRIGINE 200 MG: 100 TABLET ORAL at 14:13

## 2022-03-18 RX ADMIN — CHLORDIAZEPOXIDE HYDROCHLORIDE 10 MG: 10 CAPSULE ORAL at 14:12

## 2022-03-18 RX ADMIN — CHLORDIAZEPOXIDE HYDROCHLORIDE 10 MG: 10 CAPSULE ORAL at 09:27

## 2022-03-18 RX ADMIN — LEVOFLOXACIN 750 MG: 750 TABLET, FILM COATED ORAL at 17:32

## 2022-03-18 RX ADMIN — HYDROCODONE BITARTRATE AND ACETAMINOPHEN 1 TABLET: 5; 325 TABLET ORAL at 10:36

## 2022-03-18 RX ADMIN — HYDROCODONE BITARTRATE AND ACETAMINOPHEN 1 TABLET: 5; 325 TABLET ORAL at 23:33

## 2022-03-18 RX ADMIN — CHLORDIAZEPOXIDE HYDROCHLORIDE 10 MG: 10 CAPSULE ORAL at 21:44

## 2022-03-18 RX ADMIN — DILTIAZEM HYDROCHLORIDE 30 MG: 30 TABLET, FILM COATED ORAL at 06:40

## 2022-03-18 RX ADMIN — GUAIFENESIN 600 MG: 600 TABLET, EXTENDED RELEASE ORAL at 09:27

## 2022-03-18 ASSESSMENT — PAIN SCALES - GENERAL
PAINLEVEL_OUTOF10: 5
PAINLEVEL_OUTOF10: 7
PAINLEVEL_OUTOF10: 7
PAINLEVEL_OUTOF10: 6
PAINLEVEL_OUTOF10: 7
PAINLEVEL_OUTOF10: 4
PAINLEVEL_OUTOF10: 3
PAINLEVEL_OUTOF10: 8

## 2022-03-18 ASSESSMENT — PAIN DESCRIPTION - PAIN TYPE: TYPE: CHRONIC PAIN

## 2022-03-18 ASSESSMENT — PAIN DESCRIPTION - PROGRESSION: CLINICAL_PROGRESSION: NOT CHANGED

## 2022-03-18 ASSESSMENT — PAIN DESCRIPTION - LOCATION: LOCATION: BACK

## 2022-03-18 NOTE — PROGRESS NOTES
Be Guy MD/Karl Bennye Dale MD Meryle Hole MD/ Brianna Avendaño MD/Dr Lucas Old APRN AGACNP-BC, NP-C      Ormaryjo Robles APRN NP-C     Parish Quezada APRN NP-C                                           Pulmonary Progress Note    Patient - Alondra Reagan   Age - 42 y.o.   - 1980  MRN - [id number]  Acct # - [de-identified]  Date of Admission - 3/12/2022  7:42 AM    Consulting Service/Physician:       Primary Care Physician: Sapphire Melgoza    SUBJECTIVE:     Chief Complaint:   Chief Complaint   Patient presents with    Alcohol Intoxication     Subjective:    Magda Indy tells me she is feeling tired today with some mild shortness of breath. She reports minimal cough. She just had venous Dopplers completed. Chest x-ray is pending. She continues to be on room air with pulse ox 96%. She is afebrile. She continues on IV Levaquin. Platelets up to 562 today. Continues on Arixtra. Limited echo yesterday unchanged from prior, normal TAPSE. VITALS  /63   Pulse 108   Temp 98.4 °F (36.9 °C) (Oral)   Resp 18   Ht 5' 8\" (1.727 m)   Wt (!) 306 lb 3.5 oz (138.9 kg)   SpO2 96%   BMI 46.56 kg/m²   Wt Readings from Last 3 Encounters:   22 (!) 306 lb 3.5 oz (138.9 kg)   22 280 lb (127 kg)   21 280 lb (127 kg)     I/O (24 Hours)    Intake/Output Summary (Last 24 hours) at 3/18/2022 0956  Last data filed at 3/18/2022 0841  Gross per 24 hour   Intake 1680 ml   Output 4075 ml   Net -2395 ml     Ventilator:      Exam:   Physical Exam   Constitutional: Obese lying in bed on room air no distress  HENT: Unremarkable  Head: Normocephalic and atraumatic. Eyes: EOM are normal. Pupils are equal, round, and reactive to light. Neck: Neck supple. Cardiovascular:  Regular rate and rhythm. Normal heart tones. No JVD.     Pulmonary/Chest: Slightly diminished in bases with some fine rales in right base, on room air, respirations even and unlabored  Abdominal: Soft. Bowel sounds are normal.   Musculoskeletal: Normal range of motion. Neurological: Patient is alert and oriented to person, place, and time. Skin: Skin is warm and dry. No rash noted.    Extremities: No edema or discoloration  Infusions:      dextrose      sodium chloride 25 mL (03/17/22 0252)     Meds:     Current Facility-Administered Medications:     guaiFENesin (MUCINEX) extended release tablet 600 mg, 600 mg, Oral, BID, BERNARDA Zuluaga - CNP, 600 mg at 03/18/22 4472    potassium chloride (KLOR-CON M) extended release tablet 40 mEq, 40 mEq, Oral, PRN, 40 mEq at 03/17/22 1811 **OR** potassium bicarb-citric acid (EFFER-K) effervescent tablet 40 mEq, 40 mEq, Oral, PRN **OR** potassium chloride 10 mEq/100 mL IVPB (Peripheral Line), 10 mEq, IntraVENous, PRN, Giorgi Jones MD    chlordiazePOXIDE (LIBRIUM) capsule 10 mg, 10 mg, Oral, TID, Tamia King MD, 10 mg at 03/18/22 0927    insulin glargine (LANTUS) injection vial 50 Units, 50 Units, SubCUTAneous, Daily, Ana Banuelos MD, 50 Units at 03/18/22 0927    pravastatin (PRAVACHOL) tablet 40 mg, 40 mg, Oral, Nightly, Justice Santana MD, 40 mg at 03/17/22 2124    dilTIAZem (CARDIZEM) tablet 30 mg, 30 mg, Oral, 3 times per day, Jonna Houston MD, 30 mg at 03/18/22 0640    pantoprazole (PROTONIX) 40 mg in sodium chloride (PF) 10 mL injection, 40 mg, IntraVENous, Daily, Kaykay Castano MD, 40 mg at 03/18/22 0926    sodium chloride flush 0.9 % injection 10 mL, 10 mL, IntraVENous, PRN, Kaykay Castano MD    fondaparinux (ARIXTRA) injection 10 mg, 10 mg, SubCUTAneous, Daily, Thuan Lisa MD, 10 mg at 03/17/22 1159    HYDROcodone-acetaminophen (NORCO) 5-325 MG per tablet 1 tablet, 1 tablet, Oral, Q6H PRN, Verleighton Marti, APRN - CNP, 1 tablet at 03/18/22 0420    levoFLOXacin (LEVAQUIN) 750 MG/150ML infusion 750 mg, 750 mg, IntraVENous, Q24H, Emiliano Zacarias MD, Stopped at 03/17/22 1917    insulin lispro (HUMALOG) injection vial 0-18 Units, 0-18 Units, SubCUTAneous, TID WC, Megan Case MD, 6 Units at 03/17/22 1212    insulin lispro (HUMALOG) injection vial 0-9 Units, 0-9 Units, SubCUTAneous, Nightly, Megan Case MD, 2 Units at 03/15/22 2044    perflutren lipid microspheres (DEFINITY) injection 1.65 mg, 1.5 mL, IntraVENous, ONCE PRN, Marlen Liu MD    miconazole (MICOTIN) 2 % powder, , Topical, BID, Megan Case MD, Given at 03/18/22 0945    metoprolol (LOPRESSOR) injection 5 mg, 5 mg, IntraVENous, Q6H PRN, Megan Case MD, 5 mg at 03/14/22 1630    lamoTRIgine (LAMICTAL) tablet 200 mg, 200 mg, Oral, Daily, Alecia Flores MD, 200 mg at 03/17/22 1159    buPROPion (WELLBUTRIN XL) extended release tablet 300 mg, 300 mg, Oral, QAM, Aleica Flores MD, 300 mg at 03/18/22 0927    FLUoxetine (PROZAC) capsule 20 mg, 20 mg, Oral, Daily, Alecia Flores MD, 20 mg at 03/18/22 9360    sodium chloride flush 0.9 % injection 10 mL, 10 mL, IntraVENous, PRN, Megan Case MD, 10 mL at 03/14/22 0810    glucose (GLUTOSE) 40 % oral gel 15 g, 15 g, Oral, PRN, Zac Magallanes, DO    dextrose 50 % IV solution, 12.5 g, IntraVENous, PRN, Carlos Magallanes, DO    glucagon (rDNA) injection 1 mg, 1 mg, IntraMUSCular, PRN, Zac Magallanes, DO    dextrose 5 % solution, 100 mL/hr, IntraVENous, PRN, Carlos Magallanes, DO    magnesium sulfate 1000 mg in dextrose 5% 100 mL IVPB, 1,000 mg, IntraVENous, PRN, Karrie Ceron MD, Stopped at 03/14/22 1559    sodium phosphate 10 mmol in dextrose 5 % 250 mL IVPB, 10 mmol, IntraVENous, PRN, Stopped at 03/16/22 1415 **OR** sodium phosphate 15 mmol in dextrose 5 % 250 mL IVPB, 15 mmol, IntraVENous, PRN, Stopped at 03/15/22 1709 **OR** sodium phosphate 20 mmol in dextrose 5 % 500 mL IVPB, 20 mmol, IntraVENous, PRN, Flor Ruff MD, Stopped at 03/13/22 1330    polyethylene glycol (GLYCOLAX) packet 17 g, 17 g, Oral, Daily PRN, Flor Ruff MD    sodium chloride flush 0.9 % injection 5-40 mL, 5-40 mL, IntraVENous, 2 times per day, Hermenia Seip, MD, 10 mL at 03/17/22 2206    sodium chloride flush 0.9 % injection 5-40 mL, 5-40 mL, IntraVENous, PRN, Hermenia Seip, MD    0.9 % sodium chloride infusion, 25 mL, IntraVENous, PRN, Hermenia Seip, MD, Last Rate: 100 mL/hr at 03/17/22 0252, 25 mL at 03/17/22 0252    ondansetron (ZOFRAN-ODT) disintegrating tablet 4 mg, 4 mg, Oral, Q8H PRN **OR** ondansetron (ZOFRAN) injection 4 mg, 4 mg, IntraVENous, Q6H PRN, Hermenia Seip, MD, 4 mg at 03/13/22 1801    acetaminophen (TYLENOL) tablet 650 mg, 650 mg, Oral, Q6H PRN, 650 mg at 03/17/22 0003 **OR** acetaminophen (TYLENOL) suppository 650 mg, 650 mg, Rectal, Q6H PRN, Hermenia Seip, MD, 650 mg at 03/14/22 0449    sodium chloride flush 0.9 % injection 5-40 mL, 5-40 mL, IntraVENous, 2 times per day, Johan Tristan MD, 10 mL at 03/18/22 0945    Lab Results:     Lab Results   Component Value Date    WBC 6.3 03/18/2022    HGB 10.4 (L) 03/18/2022    HCT 31.2 (L) 03/18/2022    MCV 90.2 03/18/2022     (L) 03/18/2022     Lab Results   Component Value Date    CALCIUM 6.6 (L) 03/17/2022     03/17/2022    K 3.3 (L) 03/17/2022    CO2 21 03/17/2022     (H) 03/17/2022    BUN 6 03/17/2022    CREATININE 0.58 03/17/2022       Lab Results   Component Value Date    INR 1.2 03/13/2022    PROTIME 14.8 (H) 03/13/2022       Radiology:         ASSESSMENT:     Multifocal Community Acquired Pneumonia  Acute alcohol intoxication/ETOH abuse  Severe diabetic keto/metabolic acidosis (anion gap, lactic acid), resolved  History of hypercoagulable state, antiphospholipid antibody, maintained chronically on Arixtra  Bilateral lower lobe PE  Alcoholic Hepatitis  Pancreatitis  Morbid obesity, status post bariatric surgery 2013  SAMIR  Urinary tract infection, culture with Klebsiella and Enterococcus  Thrombocytopenia, improving  GAIL, resolved    PLAN:   1. Continue IV Levaquin  2. Continue Arixtra  3. Monitor platelets  4.  Monitor on room air, keep pulse ox above 90%  5. Supportive care  6. Follow-up on chest x-ray today        Electronically signed by BERNARDA Lowe CNP on 03/18/22     This progress note was completed using a voice transcription system. Every effort was made to ensure accuracy. However, inadvertent computerized transcription errors may be present.     Eduardo Mccarty NP-C, MSN  [facility] Pulmonary, Critical Care & Sleep

## 2022-03-18 NOTE — PLAN OF CARE
Problem: Falls - Risk of:  Goal: Will remain free from falls  Description: Will remain free from falls  3/18/2022 1739 by María Ritter RN  Outcome: Ongoing  3/18/2022 0450 by Luke Hidalgo RN  Outcome: Ongoing  Goal: Absence of physical injury  Description: Absence of physical injury  3/18/2022 1739 by María Ritter RN  Outcome: Ongoing  3/18/2022 0450 by Luke Hidalgo RN  Outcome: Ongoing     Problem: Serum Glucose Level - Abnormal:  Goal: Ability to maintain appropriate glucose levels will improve  Description: Ability to maintain appropriate glucose levels will improve  3/18/2022 1739 by María Ritter RN  Outcome: Ongoing  3/18/2022 0450 by Luke Hidalgo RN  Outcome: Ongoing     Problem: Pain:  Goal: Pain level will decrease  Description: Pain level will decrease  3/18/2022 1739 by María Ritter RN  Outcome: Ongoing  3/18/2022 0450 by Luke Hidalgo RN  Outcome: Ongoing  Goal: Control of acute pain  Description: Control of acute pain  3/18/2022 1739 by María Ritter RN  Outcome: Ongoing  3/18/2022 0450 by Luke Hidalgo RN  Outcome: Ongoing  Goal: Control of chronic pain  Description: Control of chronic pain  3/18/2022 1739 by María Ritter RN  Outcome: Ongoing  3/18/2022 0450 by Luke Hidalgo RN  Outcome: Ongoing     Problem: Musculor/Skeletal Functional Status  Goal: Highest potential functional level  3/18/2022 1739 by María Ritter RN  Outcome: Ongoing  3/18/2022 0450 by Luke Hidalgo RN  Outcome: Ongoing  Goal: Absence of falls  3/18/2022 1739 by María Ritter RN  Outcome: Ongoing  3/18/2022 0450 by Luke Hidalgo RN  Outcome: Ongoing

## 2022-03-18 NOTE — CARE COORDINATION
[facility] Encounter Date/Time: 3/12/2022 [address] [facility] Account: [de-identified]    MRN: [id number]    Patient: Diana Reyna    Contact Serial #: [id number]      ENCOUNTER          Patient Class: I Private Enc? No Unit RM BD: [facility] PROG    Hospital Service: INM   Encounter DX: DKA, type 2, not at goal*   ADM Provider: Tamara Mondragon MD   Procedure:     ATT Provider: Tamara Mondragon MD   REF Provider:        Admission DX: DKA, type 2, not at goal [facility]) and DX codes: E11.10      PATIENT                 Name: Diana Reyna : 1980 (41 yrs)   Address: [address] Sex: Female   City: [place] [address]         Marital Status:    Employer: DISABLED         Buddhist: Alfredo [employer] [facility]   Primary Care Provider: Magalis Figueroa         Primary Phone: [phone]   EMERGENCY CONTACT   Contact Name Legal Guardian? Relationship to Patient Home Phone Work Phone   1. Naima Grant  2. [facility]      Parent  Other [phone]              GUARANTOR            Guarantor: Diana Reyna     : 1980   Address: [address] [place] [address] [address] Sex: Female     [place] [id number]     Relation to Patient: Self       Home Phone: [phone]   Guarantor ID: [id number]       Work Phone:     Guarantor Employer: DISABLED         Status: DISABLED      COVERAGE        PRIMARY INSURANCE   Payor: MEDICARE Plan: MEDICARE PART A AND B   Payor Address:  [address] [address],  [facility] [address], [facility] [address]       Group Number:   Insurance Type: INDEMNITY   Subscriber Name: Chris Staples : 1980   Subscriber ID: [id number] Pat. Rel. to Sub: Self   SECONDARY INSURANCE   Payor: [place] Plan[place] *   Payor Address:   [address], [place], [address] [address]          Group Number:   Insurance Type: INDEMNITY   Subscriber Name: Chris Staples : 1980   Subscriber ID: [id number] Pat.  Rel. to Sub: SELF      [id number]        CSN Req/Control # [Problem retrieving Specimen ID]                                   Order Date:  Mar 18, 2022  [id number]                                          Patient Information      Name:  Ramy Mohamud  :  1980  Age:  42 y.o. Address:  [address] [place] [address], [place], [place]   Zip:  [address]  PCP: Anthony Finney Sex:  F  SSN: [id number]  Home Phone: [phone]  Work Phone:    Patient MRN:  [id number]    Alt Patient ID:  [id number]  PCP Phone: [phone]       Authorizing Provider Information       AUTHORIZING PROVIDER: Adair Velasco MD  Physician ID: [id number]  NPI:  [id number]  Site:   Address: [address] [place] [address]. [address] [facility] [address] Zip: [address]  Phone: [phone]  Fax: [phone]                EQUIPMENT ORDERED  DME Order for Gabriela Razo as OP [facility] (ORD   #:   [id number]) Priority  Routine Class  Hospital Performed        Associated Diagnosis:          Comments: You must complete the order parameters below and add the medical necessity documentation for this DME in a separate note.     Folding Walker with Wheels     Current patient weight: Weight: (!) 306 lb 3.5 oz (138.9 kg)  Current patient height: Height: 5' 8\" (172.7 cm)  Diagnosis: Chronic DVTs, Obesity, Antiphospholipid syndrome.   Duration: Purchase            Scheduling Instructions:                                 Specimen Source             Collection Date    Collection Time    Order Status    Expected Date                  Electronically Signed By  Adair Velasco MD Date  Mar 18, 2022  4:54 PM              Responsible Party Marylou Ruvalcaba Information     Guar-ActID   Relationship Account Type Home Phone   CHRISTOPHER THURMAN - 100* [phone] [facility] [address], [address] Self P/F [phone]   Employer   Work Phone   DISABLED                  Insurance & Policy [place] Information     Primary Insurance  Insurance/Subscriber ID:  [id number]  Subscriber Name: CHRISTOPHER THURMAN A              Relationship to Patient: Self     Secondary Insurance  Insurance/Subscriber ID: [id number]  Subscriber Name: Kevin Asai CORIE  Relationship to Patient: Self  Signed ABN: N    Payor Name:  MEDICARE   Plan:  MEDICARE PART A AND B   Group:         Payor Name: [place]  Plan:  [place] TO MEDICARE     Worker's Comp Date of Injury:

## 2022-03-18 NOTE — PROGRESS NOTES
Physical Therapy        Physical Therapy Cancel Note      DATE: 3/18/2022    NAME: Rodolph Lundborg  MRN: [id number]   : 1980      Patient not seen this date for Physical Therapy due to: Other: Defer PT at this time per [facility]. Awaiting doppler/chest x-ray results. Will continue to follow for PT services.        Electronically signed by Kayden Holloway PTA on 3/18/2022 at 10:57 AM

## 2022-03-18 NOTE — DISCHARGE SUMMARY
[address]    Discharge Summary     Patient ID: Levi Cifuentes  :  1980   MRN: [id number]     ACCOUNT:  [de-identified]   Patient's PCP: Awa Hernandez Date: 3/12/2022   Discharge Date: 3/19/2022   Length of Stay: 7  Code Status:  Full Code  Admitting Physician: Chelsea Cabral MD  Discharge Physician: Ami Barber MD     Active Discharge Diagnoses:       Primary Problem  Multifocal pneumonia      Hospital Problems  Active Hospital Problems    Diagnosis Date Noted    Acute pulmonary embolism ([facility] [address] [address] [I26.99] 2022    Acute pancreatitis [K85.90] 2022    Multifocal pneumonia [J18.9]     Alcoholic hepatitis [E47.06] 2022    Alcohol intoxication ([facility] [address] [F10.929] 2015    Antiphospholipid syndrome ([facility] [address] [D68.61]     Type 2 diabetes mellitus with diabetic polyneuropathy, with long-term current use of insulin ([facility] [address] [E11.42, Z79.4] 2015       Admission Condition:  poor     Discharged Condition: stable    Hospital Stay:       Hospital Course:  Levi Cifuentes is a 42 y.o. female who was admitted for the management of   Multifocal pneumonia , presented to ER with Alcohol Intoxication    Patient is a 34-year-old female with medical history of anxiety, bipolar disorder, history of pulmonary embolism, hypertension, iron deficiency anemia, suicidal ideation, type 2 diabetes mellitus, antiphospholipid syndrome who presents with chief complaint of alcohol intoxication. In the ED, patient was found to be tachycardic and hypoxic. Patient was placed on 2 L of oxygen via nasal cannula. Patient's VBG's were remarkable for pH of 7.187. Other remarkable labs include sodium of 111, creatinine of 1.50, lactic acid of 10.2, glucose of 1255, alkaline phosphatase of 411, ALT of 504, AST of 2478, ethanol level of 404.   Remarkable studies include:  Chest x-ray showing diffuse interstitial opacities in the perihilar areas consistent with multi focal airspace disease  CT head without contrast showing no acute intracranial abnormality     Patient was treated in the ED with fluids and insulin continuous infusion. Patient was placed on DKA protocol and decision was made to admit the patient to the ICU with consults in place for pulmonology critical care. Patient was eventually transitioned over to basal Lantus with insulin sliding scale. Her diet was advanced appropriately. Patient was also started on broad-spectrum antibiotics for multifocal pneumonia and UTI (growing Klebsiella pneumonia and Enterococcus faecalis) which was later switched to Levaquin. Patient's labs later became remarkable for thrombocytopenia, during which Lovenox was discontinued. She developed acute pulmonary embolism and patient was restarted on her home Arixtra. Decision was made to discharge the patient secondary to her improved clinical status. She was advised to continue her Arixtra, Levaquin.     Significant therapeutic interventions:     Significant Diagnostic Studies:   Labs / Micro:  CBC:   Lab Results   Component Value Date    WBC 7.1 03/19/2022    RBC 3.52 03/19/2022    RBC 4.43 06/03/2012    HGB 10.6 03/19/2022    HCT 32.2 03/19/2022    MCV 91.5 03/19/2022    MCH 30.1 03/19/2022    MCHC 32.9 03/19/2022    RDW 14.7 03/19/2022     03/19/2022     06/03/2012     BMP:    Lab Results   Component Value Date    GLUCOSE 160 03/19/2022    GLUCOSE 403 06/03/2012     03/19/2022    K 3.1 03/19/2022     03/19/2022    CO2 19 03/19/2022    ANIONGAP 13 03/19/2022    BUN 6 03/19/2022    CREATININE 0.58 03/19/2022    BUNCRER NOT REPORTED 02/01/2022    CALCIUM 7.8 03/19/2022    LABGLOM >60 03/19/2022    GFRAA >60 03/19/2022    GFR      03/19/2022     CMP:    Lab Results   Component Value Date    GLUCOSE 160 03/19/2022    GLUCOSE 403 06/03/2012     03/19/2022    K 3.1 03/19/2022     03/19/2022    CO2 19 03/19/2022 BUN 6 03/19/2022    CREATININE 0.58 03/19/2022    ANIONGAP 13 03/19/2022    ALKPHOS 313 03/15/2022     03/15/2022    AST 56 03/15/2022    BILITOT 0.69 03/15/2022    LABALBU 1.6 03/15/2022    LABALBU 3.9 06/03/2012    ALBUMIN 1.6 02/01/2022    LABGLOM >60 03/19/2022    GFRAA >60 03/19/2022    GFR      03/19/2022    PROT 3.9 03/15/2022    CALCIUM 7.8 03/19/2022     PTT:   Lab Results   Component Value Date    APTT 35.1 03/12/2022     U/A:    Lab Results   Component Value Date    COLORU Yellow 03/12/2022    TURBIDITY Clear 03/12/2022    SPECGRAV 1.030 03/12/2022    HGBUR SMALL 03/12/2022    PHUR 5.0 03/12/2022    PROTEINU TRACE 03/12/2022    GLUCOSEU LARGE 03/12/2022    GLUCOSEU 3+ 06/03/2012    KETUA NEGATIVE 03/12/2022    BILIRUBINUR NEGATIVE 03/12/2022    BILIRUBINUR NEGATIVE 06/03/2012    UROBILINOGEN Normal 03/12/2022    NITRU NEGATIVE 03/12/2022    LEUKOCYTESUR NEGATIVE 03/12/2022     TSH:    Lab Results   Component Value Date    TSH 1.71 03/14/2022       ,   urine culture: positive for Enterococcus faecalis and Klebsiella pneumonia,     Radiology:    ECHO Complete 2D W Doppler W Color    Result Date: 3/14/2022  [address] Transthoracic Echocardiography Report (TTE)  Patient Name Melissa Merino        Date of Study                 03/14/2022               CHRISTOPHER FONTENOT   Date of      1980  Gender                        Female  Birth   Age          42 year(s)  Race                             Room Number  2009        Height:                       68 inch, 172.72 cm   Corporate ID [id number]    Weight:                       280 pounds, 127 kg  #   Patient Acct [de-identified]   BSA:           2.36 m^2       BMI:      42.57  #                                                                kg/m^2   MR #         [id number]      Sonographer                   Teagan Pablo   Accession #  [id number]  Interpreting Physician        [address] [place] [address]   Fellow                   Referring Nurse Practitioner Interpreting             Referring Physician           Roxy Gutierrez  Type of Study   TTE procedure:2D Echocardiogram, M-Mode, Doppler, Color Doppler. Procedure Date Date: 03/14/2022 Start: 12:25 PM Study Location: [address] [place] [address] Technical Quality: Fair visualization Indications:LV Function, PSVT and Atrial fibrillation. Patient Status: Inpatient Height: 68 inches Weight: 280.01 pounds BSA: 2.36 m^2 BMI: 42.57 kg/m^2 Rhythm: Within normal limits BP: 126/58 mmHg CONCLUSIONS Summary Normal left ventricle size and function with an estimated EF > 55%. No segmental wall motion abnormalities seen. Mild left ventricular hypertrophy. Normal right ventricular size and function. No significant valvular regurgitation or stenosis seen. No significant pericardial effusion is seen. Signature ----------------------------------------------------------------------------  Electronically signed by Teagan Pablo(Sonographer) on 03/14/2022 01:01  PM ---------------------------------------------------------------------------- ----------------------------------------------------------------------------  Electronically signed by Carlton Randle(Interpreting physician) on 03/14/2022  01:11 PM ---------------------------------------------------------------------------- FINDINGS Left Atrium Left atrium is normal in size. Left Ventricle Normal left ventricle size and function with an estimated EF > 55%. No segmental wall motion abnormalities seen. Mild left ventricular hypertrophy. Right Atrium Right atrium is normal in size. Right Ventricle Normal right ventricular size and function. Mitral Valve Normal mitral valve structure and function. Trivial mitral regurgitation. Aortic Valve Aortic valve was not well visualized. No aortic insufficiency. No aortic stenosis. Tricuspid Valve Normal tricuspid valve structure and function. Insignificant tricuspid regurgitation, unable to estimate RVSP.  Pulmonic Valve Pulmonic valve not well visualized but Doppler velocities are normal. No pulmonic insufficiency. Pericardial Effusion No significant pericardial effusion is seen. Miscellaneous Normal aortic root dimension. E/e' average 8.5 IVC not well visualized.  M-mode / 2D Measurements & Calculations:   LVIDd:4.69 cm(3.7 - 5.6 cm)       Diastolic PKTGXF:155 ml  XFIXU:9.57 cm(2.2 - 4.0 cm)       Systolic EDBFOY:70.4 ml  QGOW:8.70 cm(0.6 - 1.1 cm)        Aortic Root:3.2 cm(2.0 - 3.7 cm)  LVPWd:1.31 cm(0.6 - 1.1 cm)       LA Dimension: 3 cm(1.9 - 4.0 cm)  Fractional Shortenin.67 %     LA volume/Index: 28.3 ml /12m^2  Calculated LVEF (%): 55.44 %      LVOT:1.9 cm   Mitral:                              Aortic   Peak E-Wave: 0.63 m/s                Peak Velocity: 1.51 m/s  Peak A-Wave: 0.74 m/s                Mean Velocity: 1.09 m/s  E/A Ratio: 0.86                      Peak Gradient: 9.12 mmHg  Peak Gradient: 1.59 mmHg             Mean Gradient: 6 mmHg  Deceleration Time: 169 msec                                        Area (continuity): 2.15 cm^2                                       AV VTI: 23.6 cm  Septal Wall E' velocity:0.06 m/s Lateral Wall E' velocity:0.11 m/s    ECHO Limited    Result Date: 3/17/2022  [facility] Transthoracic Echocardiography Report (TTE)  Patient Name Melissa Merino        Date of Study                 2022               CHRISTOPHER FONTENOT   Date of      1980  Gender                        Female  Birth   Age          42 year(s)  Race                             Room Number  2114        Height:                       68 inch, 172.72 cm   Corporate ID [id number]    Weight:                       280 pounds, 127 kg  #   Patient Acct [de-identified]   BSA:           2.36 m^2       BMI:      42.57  #                                                                kg/m^2   MR #         [id number]      Sonographer                   DemetriamineTeagan   Accession #  [id number]  Interpreting Physician        [address] [place] [address]   Fellow Referring Nurse Practitioner   Interpreting             Referring Physician           Justice Santana  Type of Study   TTE procedure:2D Echocardiogram.  Procedure Date Date: 03/17/2022 Start: 02:06 PM Study Location: [address] [place] [address] Technical Quality: Fair visualization Indications:Pulmonary embolus. Patient Status: Inpatient Height: 68 inches Weight: 280.01 pounds BSA: 2.36 m^2 BMI: 42.57 kg/m^2 Rhythm: Within normal limits HR: 110 bpm BP: 107/65 mmHg CONCLUSIONS Summary Limited study ordered post pulmonary embolism. Normal LV function. Normal right ventricle size and function. Normal TAPSE. No significant change compared with echo of 3/14/22. Signature ----------------------------------------------------------------------------  Electronically signed by Teagan Pablo(Sonographer) on 03/17/2022 02:34  PM ---------------------------------------------------------------------------- ----------------------------------------------------------------------------  Electronically signed by Carlton Randle(Interpreting physician) on 03/17/2022  08:21 PM ----------------------------------------------------------------------------    XR CHEST (2 VW)    Result Date: 3/18/2022  EXAMINATION: TWO XRAY VIEWS OF THE CHEST 3/18/2022 11:31 am COMPARISON: Chest x-ray dated 16 March 2022 HISTORY: ORDERING SYSTEM PROVIDED HISTORY: Follow-up pneumonia TECHNOLOGIST PROVIDED HISTORY: Follow-up pneumonia Reason for Exam: follow-up pneumonia FINDINGS: Similar appearance of left basilar pneumonia. Right lung is clear. No pneumothorax or pleural effusion. Stable right-sided PICC line. Similar appearance of left lower lobe pneumonia.      XR CHEST (2 VW)    Result Date: 3/16/2022  EXAMINATION: TWO XRAY VIEWS OF THE CHEST 3/16/2022 9:35 am COMPARISON: 03/13/2022 HISTORY: ORDERING SYSTEM PROVIDED HISTORY: Evaluate for pneumonia TECHNOLOGIST PROVIDED HISTORY: Evaluate for pneumonia Reason for Exam: Evaluate for pneumonia FINDINGS: As compared to prior examination, there is increasing infiltration noted in the the left lung base. Right lung appears clear. Heart and mediastinal structures appear normal.  There is a PICC line in place with distal tip overlying the junction of the superior vena cava and right atrium. Bony structures are unremarkable. Increasing infiltration in the left lung base, in keeping with pneumonia. RECOMMENDATION:     CT Head WO Contrast    Result Date: 3/12/2022  EXAMINATION: CT OF THE HEAD WITHOUT CONTRAST 3/12/2022 8:41 am TECHNIQUE: CT of the head was performed without the administration of intravenous contrast. Dose modulation, iterative reconstruction, and/or weight based adjustment of the mA/kV was utilized to reduce the radiation dose to as low as reasonably achievable. COMPARISON: Head CT 12/01/2020 HISTORY: ORDERING SYSTEM PROVIDED HISTORY: reported ams TECHNOLOGIST PROVIDED HISTORY: reported ams Decision Support Exception - unselect if not a suspected or confirmed emergency medical condition->Emergency Medical Condition (MA) Is the patient pregnant?->No Reason for Exam: reported ams Additional signs and symptoms: PT UNRESPONSIVE FINDINGS: BRAIN/VENTRICLES:  No masses nor acute intracranial hemorrhage. Intact gray/white matter differentiation without findings of acute ischemia. No mass effect nor midline shift. Patent basilar cisterns and foramen magnum. No hydrocephalus. Minimal cerebral atrophy. ORBITS:  Normal without acute abnormality. SINUSES:  Layering fluid in portions of the left frontal and bilateral ethmoid sinuses. Otherwise normally pneumatized and aerated. SOFT TISSUES/SKULL:  No acute soft tissue abnormality. No acute fracture. 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 Edentulous. No acute intracranial abnormality.      CT ABDOMEN PELVIS W IV CONTRAST Additional Contrast? Radiologist Recommendation    Result Date: 3/14/2022  EXAMINATION: CT OF THE ABDOMEN AND PELVIS WITH CONTRAST 3/13/2022 11:38 pm TECHNIQUE: CT of the abdomen and pelvis was performed with the administration of intravenous contrast. Multiplanar reformatted images are provided for review. Dose modulation, iterative reconstruction, and/or weight based adjustment of the mA/kV was utilized to reduce the radiation dose to as low as reasonably achievable. COMPARISON: 02/01/2022 HISTORY: ORDERING SYSTEM PROVIDED HISTORY: Looking for indraabdominal infection source TECHNOLOGIST PROVIDED HISTORY: Looking for indraabdominal infection source Reason for Exam: Looking for indraabdominal infection source Additional signs and symptoms: Fever, h/o multiple abdominal surgeries. Relevant Medical/Surgical History: H/O hernia repair (multiple), hysterectomy, cholecystectomy, gastric bypass, liver resection. FINDINGS: Lower Chest: There is a large confluent area of consolidation in the left lower lobe, compatible with pneumonia. More subtle infiltrate is noted in the right middle lobe. Heart size is normal.  No pericardial effusion. Organs: There is diffuse fatty infiltration of the liver. The previous peripherally calcified, central low-attenuation area along the left lobe of the liver is less conspicuous. Cholecystectomy clips are noted. The adrenal glands are unremarkable. The pancreas has mild adjacent inflammatory changes which could be related to an acute pancreatitis. The spleen is unremarkable. The kidneys are unremarkable. There is no hydronephrosis. GI/Bowel: There is mild presacral edema which has increased. No discrete fluid collection is identified. No bowel obstruction. The appendix is not visualized. Postsurgical changes are noted along the anterior abdominal wall. There is sequela of gastric bypass. Pelvis: The bladder is decompressed. Inflammatory changes surround the bladder where underlying infection cannot be excluded. The uterus is surgically absent.   There are no adnexal masses. No inguinal or pelvic sidewall adenopathy. Peritoneum/Retroperitoneum: The abdominal aorta is normal in caliber. No retroperitoneal adenopathy. There is an IVC filter. Bones/Soft Tissues: There is anasarca. No other areas of abnormal soft tissue swelling are identified. Degenerative changes are noted along the spine. Sclerotic bone islands are noted along the inferior pubic rami. No acute fracture. 1. Multifocal pneumonia, most pronounced in the left lower lobe. 2. Inflammatory changes surrounding the pancreas concerning for acute pancreatitis. No focal fluid collection. 3. Fatty liver. 4. Anasarca. 5. Presacral edema without a discrete abscess. 6. Inflammatory changes surround the bladder, concerning for underlying infection. 7. No bowel obstruction. XR CHEST PORTABLE    Result Date: 3/13/2022  EXAMINATION: ONE XRAY VIEW OF THE CHEST 3/13/2022 9:55 am COMPARISON: 03/12/2022 HISTORY: ORDERING SYSTEM PROVIDED HISTORY: Fever TECHNOLOGIST PROVIDED HISTORY: Fever Reason for Exam: fever FINDINGS: Right upper extremity PICC has been intervally placed and terminates over the right atrium. Cardiomediastinal silhouette is unchanged. Hazy left basilar opacities appear mildly worsened. The lungs are underinflated, resulting in vascular crowding and subsegmental atelectasis. .  No large pleural effusion or pneumothorax. Intervally placed right upper extremity PICC distal tip projects over the right atrium. No discrete pneumothorax. Left basilar opacities persist and appear mildly worsened, concerning for developing infection. Aspiration may have a similar appearance. Low lung volumes.      XR CHEST PORTABLE    Result Date: 3/12/2022  EXAMINATION: ONE XRAY VIEW OF THE CHEST 3/12/2022 8:00 am COMPARISON: 15 December 2020 HISTORY: ORDERING SYSTEM PROVIDED HISTORY: cough TECHNOLOGIST PROVIDED HISTORY: cough Reason for Exam: cough FINDINGS: AP portable view of the chest time stamped at 820 hours demonstrates overlying cardiac monitoring electrodes. Lung volumes are low. Size is normal.  Interstitial perihilar opacities are noted with superimposed alveolar type opacities in the left perihilar area favoring acute airspace disease. No effusion or extrapleural air. Osseous structures are age-appropriate. Low lung volumes. Diffuse interstitial opacities in the perihilar areas with left perihilar and lower lobe alveolar type opacities consistent with multifocal airspace disease. CT CHEST PULMONARY EMBOLISM W CONTRAST    Result Date: 3/16/2022  EXAMINATION: CTA OF THE CHEST 3/16/2022 5:00 pm TECHNIQUE: CTA of the chest was performed after the administration of intravenous contrast.  Multiplanar reformatted images are provided for review. MIP images are provided for review. Dose modulation, iterative reconstruction, and/or weight based adjustment of the mA/kV was utilized to reduce the radiation dose to as low as reasonably achievable. COMPARISON: None. HISTORY: ORDERING SYSTEM PROVIDED HISTORY: Elevated D-Dimer TECHNOLOGIST PROVIDED HISTORY: Elevated D-Dimer Reason for Exam: Elevated D-Dimer, sob FINDINGS: Pulmonary Arteries: Pulmonary arteries are adequately opacified for evaluation. Filling defects within the pulmonary arterial branches within the left lower lobe and right lower lobe, concerning for pulmonary embolism. Main pulmonary artery is normal in caliber. Mediastinum: No evidence of mediastinal lymphadenopathy. The heart and pericardium demonstrate no acute abnormality. There is no acute abnormality of the thoracic aorta. Lungs/pleura: Extensive consolidative changes within the left lower lobe and other scattered areas of mild focal areas of consolidation within bilateral upper lobes. Findings are concerning for multifocal pneumonia. .  No evidence of pleural effusion or pneumothorax. Upper Abdomen: Fatty liver.   For detailed description of visualized abdominal findings please refer to the abdominal CT of 03/13/2022. Mahsa Rodriguez Soft Tissues/Bones: No acute bone or soft tissue abnormality. Filling defects within the pulmonary arterial branches within the left lower lobe and right lower lobe, concerning for mild burden of pulmonary embolism. No cardiac strain Extensive consolidative changes within the left lower lobe and other scattered areas of mild focal areas of consolidation within bilateral upper lobes. Findings are concerning for multifocal pneumonia. For detailed description of visualized abdominal findings please refer to the abdominal CT of 03/13/2022. Mahsa Rodriguez RECOMMENDATIONS: Unavailable         Consultations:    Consults:     Final Specialist Recommendations/Findings:   IP CONSULT TO INTERNAL MEDICINE  IP CONSULT TO PULMONOLOGY  IP CONSULT TO SOCIAL WORK  IP CONSULT TO SOCIAL WORK  PHARMACY TO DOSE VANCOMYCIN  IP CONSULT TO CARDIOLOGY  IP CONSULT TO INFECTIOUS DISEASES  IP CONSULT TO HEM/ONC      The patient was seen and examined on day of discharge and this discharge summary is in conjunction with any daily progress note from day of discharge. Discharge plan:       Disposition: Home    Physician Follow Up:     [facility]  [address] [place] [address]. [address] [place] [address]  [phone]    to deliver your wheeled walker    Johnna Ortez  [address]  [address]  [phone]    In 3 days  check BP and BP medications     MD Juan Carlos [facility]oscanoFormerly [facility]  [address] [facility] [address]  [phone]    In 1 week  Follow up for medications counseling        Requiring Further Evaluation/Follow Up POST HOSPITALIZATION/Incidental Findings:     Diet: diabetic diet    Activity: As tolerated    Instructions to Patient: Please feel free return to the hospital if your symptoms worsen or any new concerning symptoms develop. Follow-up with your primary care physician as needed for all other the concerns.       Discharge Medications:      Medication List        START taking these medications      busPIRone 5 MG tablet  Commonly known as: BUSPAR  Take 2 tablets by mouth 3 times daily            CHANGE how you take these medications      FLUoxetine 20 MG capsule  Commonly known as: PROZAC  Take 2 capsules by mouth daily  What changed: how much to take            CONTINUE taking these medications      acetaminophen 500 MG tablet  Commonly known as: Tylenol  Take 2 tablets by mouth every 8 hours as needed for Pain     acyclovir 5 % ointment  Commonly known as: ZOVIRAX  Apply topically every 3 hours x 10 days. ADDERALL (30MG) 30 MG tablet  Generic drug: amphetamine-dextroamphetamine     benazepril 20 MG tablet  Commonly known as: LOTENSIN  TAKE 1 TABLET BY MOUTH DAILY     blood glucose test strips  Testing twice a day. Please dispense according to patients insurance. bumetanide 0.5 MG tablet  Commonly known as: BUMEX  TAKE 1 TABLET BY MOUTH DAILY AS NEEDED FOR LEG SWELLING     cyanocobalamin 1000 MCG/ML injection  Inject 1 mL into the muscle every 7 days     dicyclomine 10 MG capsule  Commonly known as: Bentyl  Take 1 capsule by mouth every 6 hours as needed (cramps)     dilTIAZem  MG extended release capsule  Generic drug: dilTIAZem     famotidine 20 MG tablet  Commonly known as: PEPCID  TAKE 1 TABLET BY MOUTH TWICE DAILY     Farxiga 10 MG tablet  Generic drug: dapagliflozin  TAKE 1 TABLET BY MOUTH EVERY MORNING     fondaparinux 10 MG/0.8ML Soln injection  Commonly known as: ARIXTRA  Inject 0.8 mLs into the skin daily     GlucaGen HypoKit 1 MG Solr injection  Generic drug: glucagon (rDNA)     glucose monitoring kit  Testing twice a day. Please dispense according to patients insurance. Insulin Pen Needle 31G X 5 MM Misc  1 each by Does not apply route daily     Klor-Con M10 10 MEQ extended release tablet  Generic drug: potassium chloride  Take 2 tablets by mouth daily as needed (take with bumex)     Lancets 30G Misc  Testing twice a day.   Please dispense according to patients insurance. ondansetron 4 MG disintegrating tablet  Commonly known as: Zofran ODT  Take 1 tablet by mouth every 8 hours as needed for Nausea     ondansetron 4 MG tablet  Commonly known as: ZOFRAN  Take 1 tablet by mouth 3 times daily as needed for Nausea or Vomiting     pravastatin 40 MG tablet  Commonly known as: PRAVACHOL  TAKE 1 TABLET(40 MG) BY MOUTH DAILY     sucralfate 1 GM tablet  Commonly known as: CARAFATE  DISSOLVE 1 TABLET INTO 15 ML( 1 TABLESPOON) OF WATER AND DRINK BY MOUTH FOUR TIMES DAILY AS NEEDED FOR GERD     SYRINGE 3CC/25GX1\" 25G X 1\" 3 ML Misc  To be used with B12 injections monthly     therapeutic multivitamin-minerals tablet     tiZANidine 4 MG tablet  Commonly known as: ZANAFLEX  TAKE 1 TABLET BY MOUTH TWICE DAILY AS NEEDED FOR BACK PAIN     Toujeo SoloStar 300 UNIT/ML Sopn  Generic drug: Insulin Glargine (1 Unit Dial)  INJECT 70 UNITS INTO THE SKIN EVERY MORNING     valACYclovir 500 MG tablet  Commonly known as: VALTREX  Take 1 tablet by mouth daily     vitamin D 1000 UNIT Tabs tablet  Commonly known as: CHOLECALCIFEROL            STOP taking these medications      buPROPion 300 MG extended release tablet  Commonly known as: WELLBUTRIN XL     lamoTRIgine 200 MG tablet  Commonly known as: LAMICTAL     LORazepam 2 MG tablet  Commonly known as: ATIVAN     pregabalin 50 MG capsule  Commonly known as: LYRICA     QUEtiapine 100 MG tablet  Commonly known as: SEROQUEL               Where to Get Your Medications        These medications were sent to [address] [place], [place] [address]k[place], [address] [place] [address] [phone] [facility] [phone]  [address] [place] [id number]      Phone: [phone]   busPIRone 5 MG tablet  FLUoxetine 20 MG capsule         Electronically signed by   Herrera German MD  3/19/2022  10:18 AM      Thank you Dr. Ethan Hodges for the opportunity to be involved in this patient's care.     Attending Physician Statement  I have discussed the care of Susan Huertas CORIE Chiang and I have examined the patient myselft and taken ros and hpi , including pertinent history and exam findings,  with the resident. I have reviewed the key elements of all parts of the encounter with the resident. I agree with the assessment, plan and orders as documented by the resident. I spent approx 35 mins in direct patient care as above and discussing discharge with patient, reviewing medications and counseling for discharge .     Electronically signed by Colonel Meeta MD

## 2022-03-18 NOTE — PROGRESS NOTES
[facility] CARDIOLOGY Progress Note    3/18/2022 6:39 AM      Subjective:  Ms. Alen Barton   Is feeling tired and  Left lateral chest pain with breathing. Patient denies any Angina chest pain or palpitations or lightheadedness or dizziness. Review of systems:  No fever or chills. No headaches.              LABS:     Recent Results (from the past 24 hour(s))   POC Glucose Fingerstick    Collection Time: 03/17/22  8:11 AM   Result Value Ref Range    POC Glucose 128 (H) 65 - 105 mg/dL   POC Glucose Fingerstick    Collection Time: 03/17/22 12:10 PM   Result Value Ref Range    POC Glucose 203 (H) 65 - 105 mg/dL   Platelet Count    Collection Time: 03/17/22  3:05 PM   Result Value Ref Range    Platelets 84 (L) 637 - 450 k/uL   POC Glucose Fingerstick    Collection Time: 03/17/22  4:48 PM   Result Value Ref Range    POC Glucose 66 65 - 105 mg/dL   POC Glucose Fingerstick    Collection Time: 03/17/22  5:11 PM   Result Value Ref Range    POC Glucose 97 65 - 105 mg/dL   POC Glucose Fingerstick    Collection Time: 03/17/22  7:52 PM   Result Value Ref Range    POC Glucose 96 65 - 105 mg/dL   Platelet Count    Collection Time: 03/17/22  9:29 PM   Result Value Ref Range    Platelets 74 (L) 725 - 450 k/uL   CBC with Auto Differential    Collection Time: 03/18/22  5:28 AM   Result Value Ref Range    WBC 6.3 3.5 - 11.0 k/uL    RBC 3.46 (L) 4.0 - 5.2 m/uL    Hemoglobin 10.4 (L) 12.0 - 16.0 g/dL    Hematocrit 31.2 (L) 36 - 46 %    MCV 90.2 80 - 100 fL    MCH 29.9 26 - 34 pg    MCHC 33.2 31 - 37 g/dL    RDW 14.6 11.5 - 14.9 %    Platelets 514 (L) 962 - 450 k/uL    MPV 10.5 6.0 - 12.0 fL    Seg Neutrophils 66 36 - 66 %    Lymphocytes 9 (L) 24 - 44 %    Monocytes 24 (H) 1 - 7 %    Eosinophils % 1 0 - 4 %    Basophils 0 0 - 2 %    Segs Absolute 4.16 1.3 - 9.1 k/uL    Absolute Lymph # 0.57 (L) 1.0 - 4.8 k/uL    Absolute Mono # 1.51 (H) 0.1 - 1.3 k/uL    Absolute Eos # 0.06 0.0 - 0.4 k/uL    Basophils Absolute 0.00 0.0 - 0.2 k/uL    Morphology TOXIC GRANULATION PRESENT     Morphology ANISOCYTOSIS PRESENT    SPECIMEN REJECTION    Collection Time: 22  5:28 AM   Result Value Ref Range    Specimen Source . BLOOD     Ordered Test  BMP MG JOSÉ LUIS     Reason for Rejection Unable to perform testing: Specimen hemolyzed. Pulse Ox: SpO2  Av.5 %  Min: 93 %  Max: 98 %    Supplemental O2: O2 Flow Rate (L/min): 3 L/min     Current Meds:    guaiFENesin  600 mg Oral BID    chlordiazePOXIDE  10 mg Oral TID    insulin glargine  50 Units SubCUTAneous Daily    pravastatin  40 mg Oral Nightly    dilTIAZem  30 mg Oral 3 times per day    pantoprazole (PROTONIX) 40 mg injection  40 mg IntraVENous Daily    fondaparinux  10 mg SubCUTAneous Daily    levofloxacin  750 mg IntraVENous Q24H    insulin lispro  0-18 Units SubCUTAneous TID WC    insulin lispro  0-9 Units SubCUTAneous Nightly    miconazole   Topical BID    lamoTRIgine  200 mg Oral Daily    buPROPion  300 mg Oral QAM    FLUoxetine  20 mg Oral Daily    sodium chloride flush  5-40 mL IntraVENous 2 times per day    sodium chloride flush  5-40 mL IntraVENous 2 times per day         Continuous Infusions:    dextrose      sodium chloride 25 mL (22 0252)              VITAL SIGNS:    /68   Pulse 106   Temp 98.7 °F (37.1 °C) (Oral)   Resp 18   Ht 5' 8\" (1.727 m)   Wt (!) 306 lb 3.5 oz (138.9 kg)   SpO2 94%   BMI 46.56 kg/m²  3 L/min      Admit Weight:  280 lb (127 kg)    Last 3 weights: Wt Readings from Last 3 Encounters:   22 (!) 306 lb 3.5 oz (138.9 kg)   22 280 lb (127 kg)   21 280 lb (127 kg)       BMI: Body mass index is 46.56 kg/m². INPUT/OUTPUT:          Intake/Output Summary (Last 24 hours) at 3/18/2022 0639  Last data filed at 3/18/2022 0536  Gross per 24 hour   Intake 2100 ml   Output 3575 ml   Net -1475 ml         Telemetry shows sinus tachycardia. EXAM:     General appearance: awake, alert. Laying in bed comfortably. Pleasant. Neck: No JVD. Chest:   [address] air entry bilaterally. No tenderness. No wheezing. Cardiac: Regular rate and rhythm. No S3 gallop or rubs. Abdomen: soft, non-tender. Extremities: no cyanosis, no clubbing, no calf tenderness, no leg edema. Skin:  warm and dry. Neuro:  Able to move all 4 extremities. 2 D ECHOCARDIOGRAM 3/14/2022:     Summary   Normal left ventricle size and function with an estimated EF > 55%. No segmental wall motion abnormalities seen. Mild left ventricular hypertrophy. Normal right ventricular size and function. No significant valvular regurgitation or stenosis seen. No significant pericardial effusion is seen.            CT Angio of chest 3/16/2022:     Impression   Filling defects within the pulmonary arterial branches within the left lower   lobe and right lower lobe, concerning for mild burden of pulmonary embolism. No cardiac strain       Extensive consolidative changes within the left lower lobe and other   scattered areas of mild focal areas of consolidation within bilateral upper   lobes.  Findings are concerning for multifocal pneumonia.             ASSESSMENT:    Paroxysmal atrial fibrillation /flutter with RVR initially during current hospitalization with no recurrence since.   Suspect supraventricular tachycardia reported is atrial flutter with 2:1 AV conduction  03/14/2022 AM upon review of rhythm strips. Currently sinus tachycardia. Low CHADS2 Vasc risk score.       Sinus tachycardia.     Normal LV systolic function.     Pulmonary emboli on Abnormal CT Angio of chest  March 2022. Management per pulmonologist who is following the patient.     Thrombocytopenia.     Diabetic ketoacidosis - Improved.     Essential hypertension.       Hyperlipidemia.     History of alcohol abuse.       Other problems as charted.       REC/PLAN:    Reviewed CT angio of chest results once again with the patient today and most likely her left lateral chest pain is due to pulmonary emboli. Continue on current cardiac medications and increase activity. No recurrent paroxysmal atrial fibrillation or paroxysmal atrial flutter documented. Repeat limited 2D echocardiogram revealed:    Summary  Limited study ordered post pulmonary embolism. Normal LV function. Normal right ventricle size and function. Normal TAPSE. No significant change compared with echo of 3/14/22. Electronically signed by Frieda Devine MD, [facility]        PLEASE NOTE:  This progress note was completed using a voice transcription system. Every effort was made to ensure accuracy. However, inadvertent computerized transcription errors may be present.

## 2022-03-18 NOTE — PROGRESS NOTES
Physical Therapy    [facility]  Initial Assessment    NAME: Levi Cifuentes  : 1980  MRN: [id number]    Date of Service: 3/18/2022    Discharge Recommendations:  Continue to assess pending progress,Patient would benefit from continued therapy after discharge   PT Equipment Recommendations  Equipment Needed: Yes  Mobility Devices: Konrad Grief: Rolling  Other: -    Assessment   Body structures, Functions, Activity limitations: Decreased functional mobility ; Decreased ADL status; Decreased strength;Decreased endurance;Decreased balance  Assessment: Pt alert, agreeable to PT. Pt was unsteady with gait and reported some dizziness, will continue to progress pt as able to PLOF. Treatment Diagnosis: Impaired functional mobility   Specific instructions for Next Treatment: progress gait, stairs, HEP  Prognosis: Good  Decision Making: Medium Complexity  Exam: ROM, MMT, bed mobility, transfers, amb, balance  Clinical Presentation: Pt alert, cooperative  Barriers to Learning: none  REQUIRES PT FOLLOW UP: Yes  Activity Tolerance  Activity Tolerance: Patient Tolerated treatment well       Patient Diagnosis(es): The encounter diagnosis was Diabetic ketoacidosis without coma associated with type 2 diabetes mellitus ([facility] [address]      has a past medical history of Alcohol abuse, Anxiety, Arthritis, Painting esophagus, Benzodiazepine overdose, Bipolar disorder, unspecified ([address] Bipolar I disorder, most recent episode depressed, severe without psychotic features ([address] Cellulitis, Chronic abdominal wound infection, Constipation, Depression, Drug overdose, intentional ([address] Genital herpes, unspecified, GERD (gastroesophageal reflux disease), History of pulmonary embolism, Hx of blood clots, Hypertension, Iron deficiency anemia, Isopropyl alcohol poisoning, Lumbosacral spondylosis without myelopathy, MDRO (multiple drug resistant organisms) resistance, Miscarriage, Morbid obesity ([address] MRSA (methicillin resistant staph aureus) culture positive, Overdose of benzodiazepine, Pernicious anemia, Primary hypercoagulable state ([address] Pulmonary embolism ([address] Suicidal behavior, Suicidal ideation, Suicide attempt ([address] SVT (supraventricular tachycardia) ([address] Toxic effect of ethanol, intentional self-harm ([address] and Type 2 diabetes mellitus, with long-term current use of insulin ([address] has a past surgical history that includes Cholecystectomy; Liver Resection; hernia repair; Tonsillectomy; Endoscopy, colon, diagnostic; Abdominal hernia repair (2014); Abdominal hernia repair (11/3/14); Bariatric Surgery (2013); Dilation and curettage of uterus (2005); Finger amputation (Left, 02/09/2015); lymph node biopsy (1990); yariel and bso (cervix removed) (2011); and IVC filter insertion. Restrictions  Restrictions/Precautions  Restrictions/Precautions: General Precautions,Fall Risk,Contact Precautions  Required Braces or Orthoses?: No  Implants present? : Metal implants (IVC filter)  Position Activity Restriction  Other position/activity restrictions: PT eval and treat  Vision/Hearing        Subjective  General  Chart Reviewed: Yes  Additional Pertinent Hx: T2DM, CIWA  Family / Caregiver Present: Yes (Mother)  Referring Practitioner: Inocencio Quinn MD  Subjective  Subjective: Received call from case Denise Meeter, needs to know what kind of walker to order for pt. Pt in bed, reports tired, needed encouragemtn to try to get up with therapist to assess need for rolling walker.    Pain Screening  Patient Currently in Pain: Yes  Pain Assessment  Pain Assessment: 0-10  Pain Level: 3  Pain Type: Chronic pain  Pain Location: Back  Clinical Progression: Not changed  Response to Pain Intervention: None  Vital Signs  BP Location: Left lower arm  Level of Consciousness: Alert (0)  Patient Currently in Pain: Yes  Oxygen Therapy  O2 Device: None (Room air)       Orientation  Orientation  Overall Orientation Status: Within Normal Limits  Social/Functional History  Social/Functional History  Lives With: Spouse  Type of Home: Mobile home  Home Layout: One level  Home Access: Stairs to enter with rails  Entrance Stairs - Number of Steps: 5  Entrance Stairs - Rails: Right  Bathroom Shower/Tub: Tub/Shower unit,Curtain  Bathroom Toilet: Handicap height  Bathroom Equipment: Hand-held shower  Bathroom Accessibility: Accessible  Home Equipment:  (no DME)  ADL Assistance: Independent  Homemaking Assistance: Independent  Homemaking Responsibilities: Yes  Ambulation Assistance: Independent (no DME)  Transfer Assistance: Independent  Active : Yes  Mode of Transportation: Car  Occupation: On disability  IADL Comments: sleeps in recliner  Additional Comments:  works full time, factory work 4p-8p shifts. Has mother that can provide SBA. No recent PT. Cognition        Objective                      Bed mobility  Supine to Sit: Stand by assistance  Sit to Supine: Stand by assistance  Scooting: Stand by assistance  Comment: Pt used bed rail for supine<>sit, slow mobility  Transfers  Sit to Stand: Contact guard assistance  Stand to sit: Stand by assistance  Bed to Chair: Stand by assistance (RW)  Comment: cues for hand placement using RW. Educated in safe use of rolling walkr, and proper adjust,ment for walekr  Ambulation  Ambulation?: Yes  Ambulation 1  Surface: level tile  Device: Rolling Walker  Assistance: Stand by assistance  Quality of Gait: normal mookie, mild unsteadiness, gait limited as pt too tired. Distance: 5'   Comments: Pt reports she has been up couple times with rolling walker on her own, feels comftable using it Pt will be a fall risk during mobility, if not using a rolling walker at his time due to her weakness/decreased endurance.   Stairs/Curb  Stairs?: No (Discussed safe tech going up/down steps, 1 step at a time.)     Balance  Posture: Good  Sitting - Static: Good  Sitting - Dynamic: Good  Standing - Static: Good;-  Standing - Dynamic: Fair;+  Comments: Fall risk, standing balance with RW        Plan   Plan  Times per week: 5x/week  Specific instructions for Next Treatment: progress gait, stairs, HEP  Current Treatment Recommendations: Strengthening,Balance Training,Functional Mobility Training,Transfer Training,Endurance Training,Gait Training,Stair training,Equipment Evaluation, Education, & procurement,Patient/Caregiver Education & Training,Safety Education & Training,Home Exercise Program  Safety Devices  Type of devices: All fall risk precautions in place,Call light within reach,Gait belt,Patient at risk for falls,Left in chair,Nurse notified (PEBBLES Borja)    G-Code       OutComes Score                                                  AM-PAC Score             Goals  Short term goals  Time Frame for Short term goals: 5 days  Short term goal 1: Pt to demo IND bed mobility. Short term goal 2: Pt to demo IND transfers. Short term goal 3: Pt to amb 100' with device prn, supervision. Short term goal 4: Pt to ascend/descend 5 stairs 1 HR, CGA/SBA. Short term goal 5: Pt to demo good technique for HEP and determine need for AD for d/c. Patient Goals   Patient goals :  To go home       Therapy Time   Individual Concurrent Group Co-treatment   Time In 2887         Time Out 1452         Minutes 10                 Danielle Leung, PT

## 2022-03-18 NOTE — PROGRESS NOTES
[address] [facility]    PROGRESS NOTE             3/18/2022    10:48 AM    Name:   July Ayala  MRN:     [id number]     Acct:      [de-identified]   Room:   2114/2114-01  IP Day:  6  Admit Date:  3/12/2022  7:42 AM    PCP:  Tripp Asif  Code Status:  Full Code    Subjective:     C/C:   Chief Complaint   Patient presents with    Alcohol Intoxication     Interval History Status: improved. No acute events overnight. Patient seen at bedside this morning. Patient tolerating regular diet with no nausea or vomiting. Patient denies any abdominal pain, chest pain, or new shortness of breath at this time. She agrees to the plan of care. Brief History:     Patient is a 74-year-old female with medical history of anxiety, bipolar disorder, history of pulmonary embolism, hypertension, iron deficiency anemia, suicidal ideation, type 2 diabetes mellitus, antiphospholipid syndrome who presents with chief complaint of alcohol intoxication. Patient reports that she has not been taking insulin for the last few days as she \"forgot\". Patient was quite somnolent at time of exam.  She did not report any significant pain and significant alcohol use last night.     In the ED, patient was found to be tachycardic and hypoxic. Patient was placed on 2 L of oxygen via nasal cannula. Patient's VBG's were remarkable for pH of 7.187. Other remarkable labs include sodium of 111, creatinine of 1.50, lactic acid of 10.2, glucose of 1255, alkaline phosphatase of 411, ALT of 504, AST of 2478, ethanol level of 404. Remarkable studies include:  · Chest x-ray showing diffuse interstitial opacities in the perihilar areas consistent with multi focal airspace disease  · CT head without contrast showing no acute intracranial abnormality     Patient was treated in the ED with fluids and insulin continuous infusion.   Patient was placed on DKA protocol and decision was made to admit the patient to the ICU with consults in place for pulmonology critical care. Patient was eventually transitioned over to basal Lantus with insulin sliding scale. Her diet was advanced appropriately. Patient was also started on broad-spectrum antibiotics initially for multifocal pneumonia and UTI which was later switched to Levaquin. Patient's labs later became remarkable for thrombocytopenia, during which Lovenox was discontinued and HIT panel was initiated. She developed acute pulmonary embolism and patient was restarted on her home Arixtra. Review of Systems:     Review of Systems   Constitutional: Negative for appetite change, chills, fatigue and fever. HENT: Negative for congestion and sore throat. Eyes: Negative for redness. Respiratory: Negative for chest tightness, shortness of breath and wheezing. Cardiovascular: Negative for chest pain, palpitations and leg swelling. Gastrointestinal: Negative for abdominal pain, nausea and vomiting. Genitourinary: Negative for dysuria and hematuria. Musculoskeletal: Negative for back pain. Skin: Negative for color change. Neurological: Negative for dizziness, weakness, light-headedness and headaches. Psychiatric/Behavioral: Negative for confusion and decreased concentration. Medications:      Allergies:  Asa [aspirin], Betadine [povidone iodine], Celexa [citalopram hydrobromide], Citalopram, Lasix [furosemide], Macrobid [nitrofurantoin], Norco [hydrocodone-acetaminophen], Betadine [povidone iodine], Codeine, Lithium, Paroxetine, Paxil [paroxetine hcl], Tape [adhesive tape], and Tegretol [carbamazepine]    Current Meds:   Scheduled Meds:    guaiFENesin  600 mg Oral BID    chlordiazePOXIDE  10 mg Oral TID    insulin glargine  50 Units SubCUTAneous Daily    pravastatin  40 mg Oral Nightly    dilTIAZem  30 mg Oral 3 times per day    pantoprazole (PROTONIX) 40 mg injection  40 mg IntraVENous Daily    fondaparinux  10 mg SubCUTAneous Daily    levofloxacin  750 mg IntraVENous Q24H    insulin lispro  0-18 Units SubCUTAneous TID WC    insulin lispro  0-9 Units SubCUTAneous Nightly    miconazole   Topical BID    lamoTRIgine  200 mg Oral Daily    buPROPion  300 mg Oral QAM    FLUoxetine  20 mg Oral Daily    sodium chloride flush  5-40 mL IntraVENous 2 times per day    sodium chloride flush  5-40 mL IntraVENous 2 times per day     Continuous Infusions:    dextrose      sodium chloride 25 mL (03/17/22 0252)     PRN Meds: potassium chloride **OR** potassium alternative oral replacement **OR** potassium chloride, sodium chloride flush, HYDROcodone 5 mg - acetaminophen, perflutren lipid microspheres, metoprolol, sodium chloride flush, glucose, dextrose, glucagon (rDNA), dextrose, magnesium sulfate, sodium phosphate IVPB **OR** sodium phosphate IVPB **OR** sodium phosphate IVPB, polyethylene glycol, sodium chloride flush, sodium chloride, ondansetron **OR** ondansetron, acetaminophen **OR** acetaminophen    Data:     Past Medical History:    Past Medical History:   Diagnosis Date    Alcohol abuse     sober since5    Anxiety     Arthritis     Painting esophagus     Benzodiazepine overdose 9/26/2015    [facility], [facility]     Bipolar I disorder, most recent episode depressed, severe without psychotic features ([facility]     Cellulitis 11/17/2018    Chronic abdominal wound infection 9/27/2015    Constipation 9/3/2019    Depression 7/12/2015    Drug overdose, intentional ([facility] 7/12/2015    Genital herpes, unspecified     GERD (gastroesophageal reflux disease)     History of pulmonary embolism     Hx of blood clots dx 2 years ago    clots in both legs and lungs     Hypertension     Iron deficiency anemia     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    Lumbosacral spondylosis without myelopathy     MDRO (multiple drug resistant organisms) resistance 2010    MRSA (abd)    Miscarriage     multiple, around 7th mos pregnant each time d/t hypercoagulation    Morbid obesity ([facility]     MRSA (methicillin resistant staph aureus) culture positive 02/10/2017    urine    Overdose of benzodiazepine     Pernicious anemia     Primary hypercoagulable state ([facility]     antiphospholipid antibodies on Arixtra shots daily    Pulmonary embolism ([facility]     Suicidal behavior 2015    Suicidal ideation     Suicide attempt ([facility] 2014    hx OD on painkillers and rubbing alcohol    SVT (supraventricular tachycardia) ([facility] 2017    Toxic effect of ethanol, intentional self-harm ([facility] 2015    Type 2 diabetes mellitus, with long-term current use of insulin ([facility]        Social History:    Tobacco:   Patient  reports that she has never smoked. She has never used smokeless tobacco.  Alcohol:     Patient  reports no history of alcohol use. Drug Use: Patient  reports no history of drug use. Family History:   Family History   Problem Relation Age of Onset    Depression Mother     High Blood Pressure Mother     High Cholesterol Mother     Diabetes Father     Heart Disease Father     Kidney Disease Father     High Blood Pressure Father     High Cholesterol Father     Cancer Maternal Uncle         of duodenum had whipple    Breast Cancer Maternal Aunt     Breast Cancer Maternal Cousin        Vitals:  /63   Pulse 108   Temp 98.4 °F (36.9 °C) (Oral)   Resp 18   Ht 5' 8\" (1.727 m)   Wt (!) 306 lb 3.5 oz (138.9 kg)   SpO2 96%   BMI 46.56 kg/m²   Temp (24hrs), Av.6 °F (37 °C), Min:98.4 °F (36.9 °C), Max:99.1 °F (37.3 °C)    Vitals:    22 2330 22 0530 22 0545 22 0830   BP: 115/66  112/68 123/63   Pulse: 109  106 108   Resp: 18   18   Temp: 98.7 °F (37.1 °C)   98.4 °F (36.9 °C)   TempSrc: Oral   Oral   SpO2: 94%   96%   Weight:  (!) 306 lb 3.5 oz (138.9 kg)     Height:           O2 Requirements:  On room air    Lines/Drains/Access: Peripheral IV, PICC double-lumen, urethral catheter    I/O(24Hr): Intake/Output Summary (Last 24 hours) at 3/18/2022 1048  Last data filed at 3/18/2022 0841  Gross per 24 hour   Intake 1330 ml   Output 4075 ml   Net -2745 ml       Labs:  Recent Results (from the past 24 hour(s))   POC Glucose Fingerstick    Collection Time: 03/17/22 12:10 PM   Result Value Ref Range    POC Glucose 203 (H) 65 - 105 mg/dL   Platelet Count    Collection Time: 03/17/22  3:05 PM   Result Value Ref Range    Platelets 84 (L) 498 - 450 k/uL   POC Glucose Fingerstick    Collection Time: 03/17/22  4:48 PM   Result Value Ref Range    POC Glucose 66 65 - 105 mg/dL   POC Glucose Fingerstick    Collection Time: 03/17/22  5:11 PM   Result Value Ref Range    POC Glucose 97 65 - 105 mg/dL   POC Glucose Fingerstick    Collection Time: 03/17/22  7:52 PM   Result Value Ref Range    POC Glucose 96 65 - 105 mg/dL   Platelet Count    Collection Time: 03/17/22  9:29 PM   Result Value Ref Range    Platelets 74 (L) 008 - 450 k/uL   CBC with Auto Differential    Collection Time: 03/18/22  5:28 AM   Result Value Ref Range    WBC 6.3 3.5 - 11.0 k/uL    RBC 3.46 (L) 4.0 - 5.2 m/uL    Hemoglobin 10.4 (L) 12.0 - 16.0 g/dL    Hematocrit 31.2 (L) 36 - 46 %    MCV 90.2 80 - 100 fL    MCH 29.9 26 - 34 pg    MCHC 33.2 31 - 37 g/dL    RDW 14.6 11.5 - 14.9 %    Platelets 543 (L) 995 - 450 k/uL    MPV 10.5 6.0 - 12.0 fL    Seg Neutrophils 66 36 - 66 %    Lymphocytes 9 (L) 24 - 44 %    Monocytes 24 (H) 1 - 7 %    Eosinophils % 1 0 - 4 %    Basophils 0 0 - 2 %    Segs Absolute 4.16 1.3 - 9.1 k/uL    Absolute Lymph # 0.57 (L) 1.0 - 4.8 k/uL    Absolute Mono # 1.51 (H) 0.1 - 1.3 k/uL    Absolute Eos # 0.06 0.0 - 0.4 k/uL    Basophils Absolute 0.00 0.0 - 0.2 k/uL    Morphology TOXIC GRANULATION PRESENT     Morphology ANISOCYTOSIS PRESENT    SPECIMEN REJECTION    Collection Time: 03/18/22  5:28 AM   Result Value Ref Range    Specimen Source . BLOOD     Ordered Test  BMP MG JOSÉ LUIS     Reason for Rejection Unable to perform testing: Specimen hemolyzed. POC Glucose Fingerstick    Collection Time: 03/18/22  6:32 AM   Result Value Ref Range    POC Glucose 118 (H) 65 - 105 mg/dL   SPECIMEN REJECTION    Collection Time: 03/18/22  7:00 AM   Result Value Ref Range    Specimen Source BLOOD     Ordered Test BMP,MG,JOSÉ LUIS     Reason for Rejection Unable to perform testing: Specimen hemolyzed. Lab Results   Component Value Date/Time    SPECIAL NOT REPORTED 09/03/2019 07:52 PM     Lab Results   Component Value Date/Time    CULTURE KLEBSIELLA PNEUMONIAE >949647 CFU/ML (A) 03/13/2022 09:36 AM    CULTURE ENTEROCOCCUS FAECALIS >561956 CFU/ML (A) 03/13/2022 09:36 AM       Recent Labs     03/17/22  1648 03/17/22  1711 03/17/22  1952 03/18/22  0632   POCGLU 66 97 96 118*       Radiology:    XR CHEST (2 VW)    Result Date: 3/16/2022  Increasing infiltration in the left lung base, in keeping with pneumonia. RECOMMENDATION:     CT Head WO Contrast    Result Date: 3/12/2022  No acute intracranial abnormality. CT ABDOMEN PELVIS W IV CONTRAST Additional Contrast? Radiologist Recommendation    Result Date: 3/14/2022  1. Multifocal pneumonia, most pronounced in the left lower lobe. 2. Inflammatory changes surrounding the pancreas concerning for acute pancreatitis. No focal fluid collection. 3. Fatty liver. 4. Anasarca. 5. Presacral edema without a discrete abscess. 6. Inflammatory changes surround the bladder, concerning for underlying infection. 7. No bowel obstruction. XR CHEST PORTABLE    Result Date: 3/13/2022  Intervally placed right upper extremity PICC distal tip projects over the right atrium. No discrete pneumothorax. Left basilar opacities persist and appear mildly worsened, concerning for developing infection. Aspiration may have a similar appearance. Low lung volumes. XR CHEST PORTABLE    Result Date: 3/12/2022  Low lung volumes.   Diffuse interstitial opacities in the perihilar areas with left perihilar and lower lobe alveolar type opacities consistent with multifocal airspace disease. CT CHEST PULMONARY EMBOLISM W CONTRAST    Result Date: 3/16/2022  Filling defects within the pulmonary arterial branches within the left lower lobe and right lower lobe, concerning for mild burden of pulmonary embolism. No cardiac strain Extensive consolidative changes within the left lower lobe and other scattered areas of mild focal areas of consolidation within bilateral upper lobes. Findings are concerning for multifocal pneumonia. For detailed description of visualized abdominal findings please refer to the abdominal CT of 03/13/2022. Eze Rangel RECOMMENDATIONS: Unavailable        Physical Examination:        Physical Exam  Constitutional:       General: She is not in acute distress. Appearance: Normal appearance. She is obese. HENT:      Head: Normocephalic and atraumatic. Right Ear: External ear normal.      Left Ear: External ear normal.      Nose: Nose normal.   Eyes:      Extraocular Movements: Extraocular movements intact. Cardiovascular:      Rate and Rhythm: Regular rhythm. Tachycardia present. Pulses: Normal pulses. Heart sounds: Normal heart sounds. No murmur heard. Pulmonary:      Effort: Pulmonary effort is normal. No respiratory distress. Breath sounds: Normal breath sounds. No wheezing. Abdominal:      General: Bowel sounds are normal. There is no distension. Palpations: Abdomen is soft. Tenderness: There is no abdominal tenderness. There is no guarding or rebound. Musculoskeletal:      Right lower leg: No edema. Left lower leg: No edema. Skin:     General: Skin is warm. Coloration: Skin is not jaundiced. Neurological:      General: No focal deficit present. Mental Status: She is alert and oriented to person, place, and time. Mental status is at baseline.    Psychiatric:         Mood and Affect: Mood normal.         Behavior: Behavior normal.         Thought Content: Thought content normal.           Assessment:        Primary Problem  Multifocal pneumonia    Active Hospital Problems    Diagnosis Date Noted    Acute pulmonary embolism ([facility] [I26.99] 03/17/2022    Acute pancreatitis [K85.90] 03/14/2022    Multifocal pneumonia [J18.9] 32/09/9272    Alcoholic hepatitis [I43.52] 03/14/2022    [facility] [F10.929] 12/16/2015    Antiphospholipid syndrome ([facility] [D68.61]     Type 2 diabetes mellitus with diabetic polyneuropathy, with long-term current use of insulin ([facility] [E11.42, Z79.4] 07/04/2015       Plan:        Sepsis secondary to multifocal pneumonia of left lower lobe versus UTI (improving):   · Afebrile, , WBC within normal limits   · On 3 L oxygen via nasal cannula  · CT chest: Findings of multifocal pneumonia of left lower lobe and bilateral upper lobes  · Respiratory panel negative and procalcitonin elevated 0.47  · Urine cultures growing Klebsiella pneumonia and Enterococcus faecalis, C. difficile negative, blood cultures no growth  · Infectious disease consulted: Levaquin IV  Multilobar pulmonary embolism 2/2 antiphospholipid syndrome:   · D-dimer elevated, positive CT findings; restarted home Arixtra  · Platelets 220K on admission now 107k, trending upwards 3/16  · HIT panel negative  · Bilateral lower extremity venous duplex pending  · Hematology oncology consulted: Restarted on home Arixtra  Iron deficiency anemia (improving): Hemoglobin 15.5 on admission now 10.4, continue trending; fecal occult negative  Diabetes mellitus: Lantus 50 units subcutaneous daily, high-dose insulin sliding scale, hypoglycemia protocol; advancing to regular diet  Acute pancreatitis: Inflammatory changes surrounding the pancreas on CT abdomen pelvis, lipase downtrending, continue fluids  Alcohol abuse: Librium 10 mg 3 times daily, thiamine 50 mg IV  Alcoholic hepatitis: Elevated LFTs, downtrending; CT abdomen/pelvis showing fatty liver     Diabetic Ketoacidosis, 2/2 poor insulin compliance (resolved)  · Discontinued IV fluids and insulin drip  Atrial fibrillation (resolved): Cardiology consulted: Echo LVEF>55%, oral Cardizem 30 mg 3 times per day  GAIL (resolved): Creatinine 1.50 on admission, now 0.66; continue fluids    Comorbid conditions:  Depression: Wellbutrin, fluoxetine  Neuropathy: Lamictal  Hyperlipidemia: Pravastatin  Hypertension: Holding lisinopril  History of SVT: Diltiazem     DVT prophylaxis:  Arixtra 10 mg subcutaneous daily  GI prophylaxis:  Protonix 40 mg IV daily  Disposition: Likely home  Code: Full Code   Diet: ADULT DIET; Regular; 4 carb choices (60 gm/meal)   Consults: IP CONSULT TO INTERNAL MEDICINE  IP CONSULT TO PULMONOLOGY  IP CONSULT TO SOCIAL WORK  IP CONSULT TO SOCIAL WORK  PHARMACY TO DOSE VANCOMYCIN  IP CONSULT TO CARDIOLOGY  IP CONSULT TO INFECTIOUS DISEASES  IP CONSULT TO HEM/ONC     Renay France MD  3/18/2022  10:48 AM     Attending Physician Statement  I have discussed the care of Clarissa Floyd with the resident team. I have examined the patient myself and taken ros and hpi , including pertinent history and exam findings,  with the resident. I have reviewed the key elements of all parts of the encounter with the resident. I agree with the assessment, plan and orders as documented by the resident. Principal Problem:    Multifocal pneumonia  Active Problems:    Type 2 diabetes mellitus with diabetic polyneuropathy, with long-term current use of insulin (HCC)    Antiphospholipid syndrome (HCC)    Alcohol intoxication ([facility]    Acute pancreatitis    Alcoholic hepatitis    Acute pulmonary embolism (HCC)  Resolved Problems:    DKA, type 2, not at goal [facility])    GAIL (acute kidney injury) ([facility]    On room air now  abx changed to PO  Pt wants to go home with  homePT  Hb better  US leg vein result awaited  Pt needs walker   discharge later today/tomorrow.          Electronically signed by Jose Francisco Rosado MD

## 2022-03-18 NOTE — PROGRESS NOTES
[facility] -   Infectious diseases evaluation  admission date 3/12/2022    reason for consultation:   Fever    Impression :   Current:  · Multifocal community-acquired pneumonia  · UTI growing Klebsiella pneumoniae and Enterococcus faecalis  · Sepsis secondary to above  · DKA  · Acute alcohol intoxication /hepatitis  · Paroxysmal atrial fibrillation  · Pancreatitis  · Fatty liver  · Thrombocytopenia    Recommendations      · Transition IV Levaquin to oral Levaquin 750 mg daily  · CXR 3/18 shows similar appearance of left lower lobe pneumonia  · F/u Procal tomorrow  · Nasal swab for MRSA was negative  · Vancomycin was started 3/13/2022 discontinued 3/15/2022  · IV cefepime and Flagyl discontinued 3/15/2022  · Follow blood cultures, no growth to date  · Supportive care            History of Present Illness:   Initial history:  Levi Cifuentes is a 42y.o.-year-old female was brought to the hospital per her family for generalized weakness, reportedly the patient was drinking alcohol all night and her  was having difficulty getting her to the bathroom in the morning so he called EMS. At the ER she was tachycardic and hypoxic was placed on oxygen per nasal cannula. Lactic acid was elevated, liver enzymes elevated, ethanol level was 404  Chest x-ray showed diffuse interstitial opacities    Urine culture on 3/13/2021 grew KLEBSIELLA PNEUMONIAE and ENTEROCOCCUS FAECALIS    3/13/2022 stool for C. difficile and GI panel negative  Respiratory panel with COVID-19 was negative        Interval changes  3/18/2022     Patient was seen and examined bedside. No acute events overnight. Patient is afebrile 24 hours. Tachycardic this morning. WBC 6.3. Platelet count 801. We we will transition patient from IV Levaquin to oral.  Patient has no specific complaints this morning.     Denies any fever, chills, headaches, chest pain, S OB, palpitations, abdominal pain, nausea, or vomiting. Patient Vitals for the past 8 hrs:   BP Temp Temp src Pulse Resp SpO2 Weight   03/18/22 1208 101/66 -- -- 109 18 93 % --   03/18/22 0830 123/63 98.4 °F (36.9 °C) Oral 108 18 96 % --   03/18/22 0545 112/68 -- -- 106 -- -- --   03/18/22 0530 -- -- -- -- -- -- (!) 306 lb 3.5 oz (138.9 kg)           I have personally reviewed the past medical history, past surgical history, medications, social history, and family history, and I haveupdated the database accordingly. Allergies:   Asa [aspirin], Betadine [povidone iodine], Celexa [citalopram hydrobromide], Citalopram, Lasix [furosemide], Macrobid [nitrofurantoin], Norco [hydrocodone-acetaminophen], Betadine [povidone iodine], Codeine, Lithium, Paroxetine, Paxil [paroxetine hcl], Tape [adhesive tape], and Tegretol [carbamazepine]     Review of Systems:     Review of Systems  As per history of present illness, other than above 12 system review was negative  Physical Examination :   Physical Exam  Constitutional:       General: She is not in acute distress. Appearance: Normal appearance. HENT:      Head: Normocephalic and atraumatic. Right Ear: External ear normal.      Left Ear: External ear normal.      Nose: Nose normal.   Eyes:      Extraocular Movements: Extraocular movements intact. Cardiovascular:      Rate and Rhythm: Regular; Tachycardic      Pulses: Normal pulses. Heart sounds: Normal heart sounds. No murmur heard.       Pulmonary:      Effort: Pulmonary effort is normal. No respiratory distress. Breath sounds: Normal breath sounds. No wheezing. Abdominal:      General: Bowel sounds are normal. There is no distension. Palpations: Abdomen is soft. Tenderness: There is no abdominal tenderness. There is no guarding or rebound. Musculoskeletal:      Right lower leg: No edema. Left lower leg: No edema. Skin:     General: Skin is warm. Coloration: Skin is not jaundiced.    Neurological:      General: No focal deficit present. Mental Status: She is alert and oriented to person, place, and time. Mental status is at baseline. Psychiatric:         Mood and Affect: Mood normal.         Behavior: Behavior normal.         Thought Content:  Thought content normal.        Physical Exam    Past Medical History:     Past Medical History:   Diagnosis Date    Alcohol abuse     sober [id number]    Anxiety     Arthritis     Painting esophagus     Benzodiazepine overdose 9/26/2015    Bipolar disorder, unspecified ([address]     Bipolar I disorder, most recent episode depressed, severe without psychotic features ([address]     Cellulitis 11/17/2018    Chronic abdominal wound infection 9/27/2015    Constipation 9/3/2019    Depression 7/12/2015    Drug overdose, intentional ([address] 7/12/2015    Genital herpes, unspecified     GERD (gastroesophageal reflux disease)     History of pulmonary embolism     Hx of blood clots dx 2 years ago    clots in both legs and lungs     Hypertension     Iron deficiency anemia     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    Lumbosacral spondylosis without myelopathy     MDRO (multiple drug resistant organisms) resistance 2010    MRSA (abd)    Miscarriage     multiple, around 7th mos pregnant each time d/t hypercoagulation    Morbid obesity ([address]     MRSA (methicillin resistant staph aureus) culture positive 02/10/2017    urine    Overdose of benzodiazepine     Pernicious anemia     Primary hypercoagulable state ([address]     antiphospholipid antibodies on Arixtra shots daily    Pulmonary embolism ([address] 2010    Suicidal behavior 12/14/2015    Suicidal ideation     Suicide attempt ([address] 2014    hx OD on painkillers and rubbing alcohol    SVT (supraventricular tachycardia) ([address] 04/07/2017    Toxic effect of ethanol, intentional self-harm ([address] 12/16/2015    Type 2 diabetes mellitus, with long-term current use of insulin ([address]        Past Surgical  History:     Past Surgical History: Procedure Laterality Date    ABDOMINAL HERNIA REPAIR  2014    wound vac    ABDOMINAL HERNIA REPAIR  11/3/14    BARIATRIC SURGERY  2013    [address] [facility]    CHOLECYSTECTOMY      DILATION AND CURETTAGE OF UTERUS  2005    ENDOSCOPY, COLON, DIAGNOSTIC      EGD    FINGER AMPUTATION Left 02/09/2015    index and ring finger. from frostbite    HERNIA REPAIR      total of 8    IVC FILTER INSERTION      LIVER RESECTION      for hepatic adenoma    LYMPH NODE BIOPSY  1990    JUSTINE AND BSO  2011    TONSILLECTOMY         Medications:      levoFLOXacin  750 mg Oral Daily    guaiFENesin  600 mg Oral BID    chlordiazePOXIDE  10 mg Oral TID    insulin glargine  50 Units SubCUTAneous Daily    pravastatin  40 mg Oral Nightly    dilTIAZem  30 mg Oral 3 times per day    pantoprazole (PROTONIX) 40 mg injection  40 mg IntraVENous Daily    fondaparinux  10 mg SubCUTAneous Daily    insulin lispro  0-18 Units SubCUTAneous TID WC    insulin lispro  0-9 Units SubCUTAneous Nightly    miconazole   Topical BID    lamoTRIgine  200 mg Oral Daily    buPROPion  300 mg Oral QAM    FLUoxetine  20 mg Oral Daily    sodium chloride flush  5-40 mL IntraVENous 2 times per day    sodium chloride flush  5-40 mL IntraVENous 2 times per day       Social History:     Social History     Socioeconomic History    Marital status:      Spouse name: Not on file    Number of children: 1    Years of education: 3 years of college    Highest education level: Not on file   Occupational History    Occupation: infant care     Comment: last worked 2008   Tobacco Use    Smoking status: Never Smoker    Smokeless tobacco: Never Used   Substance and Sexual Activity    Alcohol use: No     Alcohol/week: 0.0 standard drinks     Comment: Last alcohol use was in 12/2015    Drug use: No     Comment: Pt stole her mom's Benzo 1 yr ago. Pt said she took more than prescribed dose of Valium once in late 90's.     Sexual activity: Yes Partners: Male   Other Topics Concern    Not on file   Social History Narrative    Lives with Eve Natarajan ex  and daughter          Social Determinants of Health     Financial Resource Strain:     Difficulty of Paying Living Expenses: Not on file   Food Insecurity:     Worried About Running Out of Food in the Last Year: Not on file    [place]imo of Food in the Last Year: Not on file   Transportation Needs:     Lack of Transportation (Medical): Not on file    Lack of Transportation (Non-Medical):  Not on file   Physical Activity:     Days of Exercise per Week: Not on file    Minutes of Exercise per Session: Not on file   Stress:     Feeling of Stress : Not on file   Social Connections:     Frequency of Communication with Friends and Family: Not on file    Frequency of Social Gatherings with Friends and Family: Not on file    Attends [employer] Services: Not on file    Active Member of [address] [place] [address] [employer] or Organizations: Not on file    Attends Club or Organization Meetings: Not on file    Marital Status: Not on file   Intimate Partner Violence:     Fear of Current or Ex-Partner: Not on file    Emotionally Abused: Not on file    Physically Abused: Not on file    Sexually Abused: Not on file   Housing Stability:     Unable to Pay for Housing in the Last Year: Not on file    Number of [place] in the Last Year: Not on file    Unstable Housing in the Last Year: Not on file       Family History:     Family History   Problem Relation Age of Onset    Depression Mother     High Blood Pressure Mother     High Cholesterol Mother     Diabetes Father     Heart Disease Father     Kidney Disease Father     High Blood Pressure Father     High Cholesterol Father     Cancer Maternal Uncle         of duodenum had whipple    Breast Cancer Maternal Aunt     Breast Cancer Maternal Cousin       Medical Decision Making:   I have independently reviewed/ordered the following labs:    CBC with Differential:   Recent Labs 03/17/22  0450 03/17/22  1505 03/17/22  2129 03/18/22  0528   WBC 4.6  --   --  6.3   HGB 9.7*  --   --  10.4*   HCT 29.7*  --   --  31.2*   PLT 69*   < > 74* 107*   LYMPHOPCT 7*  --   --  9*   MONOPCT 12*  --   --  24*    < > = values in this interval not displayed. BMP:  Recent Labs     03/17/22  0450 03/18/22  1045    136   K 3.3* 3.5*   * 105   CO2 21 22   BUN 6 6   CREATININE 0.58 0.57   MG 1.7 2.1     Hepatic Function Panel:   No results for input(s): PROT, LABALBU, BILIDIR, IBILI, BILITOT, ALKPHOS, ALT, AST in the last 72 hours. No results for input(s): RPR in the last 72 hours. No results for input(s): HIV in the last 72 hours. No results for input(s): BC in the last 72 hours. Lab Results   Component Value Date    CREATININE 0.57 03/18/2022    GLUCOSE 177 03/18/2022    GLUCOSE 403 06/03/2012       Detailed results: Thank you for allowing us to participate in the care of this patient. Please call with questions. This note is created with the assistance of a speech recognition program.  While intending to generate adocument that actually reflects the content of the visit, the document can still have some errors including those of syntax and sound a like substitutions which may escape proof reading. It such instances, actual meaningcan be extrapolated by contextual diversion. Gurjit Marie MD   Attending Physician Statement  I have discussed the care of the patient, including pertinent history and exam findings,  with the resident. I have reviewed the key elements of all parts of the encounter with the resident. I agree with the assessment, plan and orders as documented by the resident.     Paolo Allen MD    Office: [phone]  Perfect serve / office [phone]

## 2022-03-18 NOTE — PLAN OF CARE
Safety maintained, call light is within reach, no s/s or c/o distress, bed is low/locked, side rails are up x2, bed alarm remains on  Problem: Falls - Risk of:  Goal: Will remain free from falls  Description: Will remain free from falls  Outcome: Ongoing  Goal: Absence of physical injury  Description: Absence of physical injury  Outcome: Ongoing     Problem: Skin Integrity:  Goal: Will show no infection signs and symptoms  Description: Will show no infection signs and symptoms  Outcome: Ongoing  Goal: Absence of new skin breakdown  Description: Absence of new skin breakdown  Outcome: Ongoing     Problem: Serum Glucose Level - Abnormal:  Goal: Ability to maintain appropriate glucose levels will improve  Description: Ability to maintain appropriate glucose levels will improve  Outcome: Ongoing     Problem: Pain:  Goal: Pain level will decrease  Description: Pain level will decrease  Outcome: Ongoing  Goal: Control of acute pain  Description: Control of acute pain  Outcome: Ongoing  Goal: Control of chronic pain  Description: Control of chronic pain  Outcome: Ongoing     Problem: Musculor/Skeletal Functional Status  Goal: Highest potential functional level  Outcome: Ongoing  Goal: Absence of falls  Outcome: Ongoing

## 2022-03-19 VITALS
SYSTOLIC BLOOD PRESSURE: 84 MMHG | BODY MASS INDEX: 44.41 KG/M2 | OXYGEN SATURATION: 97 % | HEART RATE: 110 BPM | RESPIRATION RATE: 18 BRPM | DIASTOLIC BLOOD PRESSURE: 76 MMHG | WEIGHT: 293 LBS | TEMPERATURE: 97.8 F | HEIGHT: 68 IN

## 2022-03-19 LAB
ABSOLUTE EOS #: 0 K/UL (ref 0–0.4)
ABSOLUTE LYMPH #: 0.7 K/UL (ref 1–4.8)
ABSOLUTE MONO #: 1 K/UL (ref 0.1–1.3)
ANION GAP SERPL CALCULATED.3IONS-SCNC: 13 MMOL/L (ref 9–17)
BASOPHILS # BLD: 0 % (ref 0–2)
BASOPHILS ABSOLUTE: 0 K/UL (ref 0–0.2)
BUN BLDV-MCNC: 6 MG/DL (ref 6–20)
CALCIUM SERPL-MCNC: 7.8 MG/DL (ref 8.6–10.4)
CHLORIDE BLD-SCNC: 104 MMOL/L (ref 98–107)
CO2: 19 MMOL/L (ref 20–31)
CREAT SERPL-MCNC: 0.58 MG/DL (ref 0.5–0.9)
EOSINOPHILS RELATIVE PERCENT: 0 % (ref 0–4)
GFR AFRICAN AMERICAN: >60 ML/MIN
GFR NON-AFRICAN AMERICAN: >60 ML/MIN
GFR SERPL CREATININE-BSD FRML MDRD: ABNORMAL ML/MIN/{1.73_M2}
GLUCOSE BLD-MCNC: 109 MG/DL (ref 65–105)
GLUCOSE BLD-MCNC: 160 MG/DL (ref 70–99)
GLUCOSE BLD-MCNC: 257 MG/DL (ref 65–105)
HCT VFR BLD CALC: 32.2 % (ref 36–46)
HEMOGLOBIN: 10.6 G/DL (ref 12–16)
LYMPHOCYTES # BLD: 10 % (ref 24–44)
MAGNESIUM: 2.2 MG/DL (ref 1.6–2.6)
MCH RBC QN AUTO: 30.1 PG (ref 26–34)
MCHC RBC AUTO-ENTMCNC: 32.9 G/DL (ref 31–37)
MCV RBC AUTO: 91.5 FL (ref 80–100)
MONOCYTES # BLD: 14 % (ref 1–7)
PDW BLD-RTO: 14.7 % (ref 11.5–14.9)
PHOSPHORUS: 4.3 MG/DL (ref 2.6–4.5)
PLATELET # BLD: 185 K/UL (ref 150–450)
PMV BLD AUTO: 10.2 FL (ref 6–12)
POTASSIUM SERPL-SCNC: 3.1 MMOL/L (ref 3.7–5.3)
PROCALCITONIN: 0.34 NG/ML
RBC # BLD: 3.52 M/UL (ref 4–5.2)
SEG NEUTROPHILS: 76 % (ref 36–66)
SEGMENTED NEUTROPHILS ABSOLUTE COUNT: 5.3 K/UL (ref 1.3–9.1)
SODIUM BLD-SCNC: 136 MMOL/L (ref 135–144)
WBC # BLD: 7.1 K/UL (ref 3.5–11)

## 2022-03-19 PROCEDURE — 6370000000 HC RX 637 (ALT 250 FOR IP): Performed by: NURSE PRACTITIONER

## 2022-03-19 PROCEDURE — 84100 ASSAY OF PHOSPHORUS: CPT

## 2022-03-19 PROCEDURE — 6360000002 HC RX W HCPCS: Performed by: INTERNAL MEDICINE

## 2022-03-19 PROCEDURE — A4216 STERILE WATER/SALINE, 10 ML: HCPCS | Performed by: INTERNAL MEDICINE

## 2022-03-19 PROCEDURE — 83735 ASSAY OF MAGNESIUM: CPT

## 2022-03-19 PROCEDURE — C9113 INJ PANTOPRAZOLE SODIUM, VIA: HCPCS | Performed by: INTERNAL MEDICINE

## 2022-03-19 PROCEDURE — 82947 ASSAY GLUCOSE BLOOD QUANT: CPT

## 2022-03-19 PROCEDURE — 85025 COMPLETE CBC W/AUTO DIFF WBC: CPT

## 2022-03-19 PROCEDURE — 2580000003 HC RX 258: Performed by: INTERNAL MEDICINE

## 2022-03-19 PROCEDURE — 36415 COLL VENOUS BLD VENIPUNCTURE: CPT

## 2022-03-19 PROCEDURE — 99232 SBSQ HOSP IP/OBS MODERATE 35: CPT | Performed by: INTERNAL MEDICINE

## 2022-03-19 PROCEDURE — 2500000003 HC RX 250 WO HCPCS: Performed by: STUDENT IN AN ORGANIZED HEALTH CARE EDUCATION/TRAINING PROGRAM

## 2022-03-19 PROCEDURE — 84145 PROCALCITONIN (PCT): CPT

## 2022-03-19 PROCEDURE — 6370000000 HC RX 637 (ALT 250 FOR IP)

## 2022-03-19 PROCEDURE — 2580000003 HC RX 258: Performed by: STUDENT IN AN ORGANIZED HEALTH CARE EDUCATION/TRAINING PROGRAM

## 2022-03-19 PROCEDURE — 6370000000 HC RX 637 (ALT 250 FOR IP): Performed by: INTERNAL MEDICINE

## 2022-03-19 PROCEDURE — 6370000000 HC RX 637 (ALT 250 FOR IP): Performed by: STUDENT IN AN ORGANIZED HEALTH CARE EDUCATION/TRAINING PROGRAM

## 2022-03-19 PROCEDURE — 80048 BASIC METABOLIC PNL TOTAL CA: CPT

## 2022-03-19 RX ORDER — DILTIAZEM HYDROCHLORIDE 180 MG/1
180 CAPSULE, COATED, EXTENDED RELEASE ORAL DAILY
Status: DISCONTINUED | OUTPATIENT
Start: 2022-03-19 | End: 2022-03-19 | Stop reason: HOSPADM

## 2022-03-19 RX ORDER — FLUOXETINE HYDROCHLORIDE 20 MG/1
40 CAPSULE ORAL DAILY
Qty: 30 CAPSULE | Refills: 3 | Status: ON HOLD | OUTPATIENT
Start: 2022-03-19 | End: 2022-06-10

## 2022-03-19 RX ORDER — LEVOFLOXACIN 750 MG/1
750 TABLET ORAL DAILY
Qty: 1 TABLET | Refills: 0 | Status: SHIPPED | OUTPATIENT
Start: 2022-03-19 | End: 2022-03-20

## 2022-03-19 RX ORDER — BUSPIRONE HYDROCHLORIDE 5 MG/1
10 TABLET ORAL 3 TIMES DAILY
Qty: 60 TABLET | Refills: 0 | Status: SHIPPED | OUTPATIENT
Start: 2022-03-19 | End: 2022-04-18

## 2022-03-19 RX ADMIN — SODIUM CHLORIDE, PRESERVATIVE FREE 10 ML: 5 INJECTION INTRAVENOUS at 00:38

## 2022-03-19 RX ADMIN — HYDROCODONE BITARTRATE AND ACETAMINOPHEN 1 TABLET: 5; 325 TABLET ORAL at 05:27

## 2022-03-19 RX ADMIN — INSULIN GLARGINE 50 UNITS: 100 INJECTION, SOLUTION SUBCUTANEOUS at 09:00

## 2022-03-19 RX ADMIN — SODIUM CHLORIDE, PRESERVATIVE FREE 10 ML: 5 INJECTION INTRAVENOUS at 00:40

## 2022-03-19 RX ADMIN — GUAIFENESIN 600 MG: 600 TABLET, EXTENDED RELEASE ORAL at 09:00

## 2022-03-19 RX ADMIN — BUPROPION HYDROCHLORIDE 300 MG: 300 TABLET, FILM COATED, EXTENDED RELEASE ORAL at 09:00

## 2022-03-19 RX ADMIN — LAMOTRIGINE 200 MG: 100 TABLET ORAL at 09:10

## 2022-03-19 RX ADMIN — ANTI-FUNGAL POWDER MICONAZOLE NITRATE TALC FREE: 1.42 POWDER TOPICAL at 10:41

## 2022-03-19 RX ADMIN — SODIUM CHLORIDE 40 MG: 9 INJECTION INTRAMUSCULAR; INTRAVENOUS; SUBCUTANEOUS at 09:10

## 2022-03-19 RX ADMIN — DILTIAZEM HYDROCHLORIDE 30 MG: 30 TABLET, FILM COATED ORAL at 05:27

## 2022-03-19 RX ADMIN — FONDAPARINUX SODIUM 10 MG: 10 INJECTION, SOLUTION SUBCUTANEOUS at 09:10

## 2022-03-19 RX ADMIN — FLUOXETINE HYDROCHLORIDE 20 MG: 20 CAPSULE ORAL at 09:00

## 2022-03-19 RX ADMIN — HYDROCODONE BITARTRATE AND ACETAMINOPHEN 1 TABLET: 5; 325 TABLET ORAL at 12:08

## 2022-03-19 RX ADMIN — INSULIN LISPRO 9 UNITS: 100 INJECTION, SOLUTION INTRAVENOUS; SUBCUTANEOUS at 11:29

## 2022-03-19 RX ADMIN — CHLORDIAZEPOXIDE HYDROCHLORIDE 10 MG: 10 CAPSULE ORAL at 09:08

## 2022-03-19 ASSESSMENT — PAIN SCALES - GENERAL
PAINLEVEL_OUTOF10: 10
PAINLEVEL_OUTOF10: 3
PAINLEVEL_OUTOF10: 6
PAINLEVEL_OUTOF10: 4

## 2022-03-19 ASSESSMENT — ENCOUNTER SYMPTOMS
DIARRHEA: 0
COUGH: 0
NAUSEA: 0
SHORTNESS OF BREATH: 0
VOMITING: 0
ABDOMINAL PAIN: 0

## 2022-03-19 NOTE — CARE COORDINATION
ONGOING DISCHARGE PLAN:    Patient is alert and oriented x4. Spouse at bedside. Spoke with patient regarding discharge plan and patient confirms that plan is still home with spouse. [facility] was following for [facility]. ERIC called [facility] to see if patient was accepted. Awaiting return call. Servando Cervantes from [facility] notified of need for wheeled walker for d/c. Awaiting return call. Will continue to follow for additional discharge needs. Electronically signed by Sirena Overton RN on 3/19/2022 at 11:40 AM     [facility] can accept the patient for home care. WIll fax [facility] and med list once completed. [facility] for walker ref[facility]ed to [facility]. Delivery around 2pm today.  PEBBLES Macias notified//JF  Electronically signed by Sirena Overton RN on 3/19/2022 at 11:51 AM

## 2022-03-19 NOTE — PROGRESS NOTES
Today's Date: 3/19/2022  Patient Name: Helder Luna  Date of admission: 3/12/2022  7:42 AM  Patient's age: 42 y.o., 1980  Admission Dx: DKA, type 2, not at goal [facility]) [E11.10]    Reason for Consult: possible HIT  Requesting Physician: Johan Tristan MD    CHIEF COMPLAINT:    Chief Complaint   Patient presents with    Alcohol Intoxication       SUBJECTIVE:    Pt seen and examined  Tolerating arixtra well  No bleeding symptoms  Platelet back to normal    HISTORY OF PRESENT ILLNESS:    Helder Luna is a 42 y.o. female with a history of antiphospholipid antibody syndrome, chronically maintained on Arixtra, history of obesity and status post gastric bypass in 2013, who is admitted to the hospital on 3/12/2022  for alcohol intoxication. Patient reported to her heavy alcohol intake prior to her admission and brought in by her  with altered mental status and admitted for ICU management of her DKA. Patient has history of past medical history of anxiety, bipolar disorder, history of PE, hypertension, anemia and diabetes and suicidal ideation. Patient reportedly has not been compliant with her medications. On admission her ethanol level noted to be 404 and noted to have elevated liver enzymes. Her checks x-ray showed diffuse interstitial opacities and urine culture growing Klebsiella pneumoniae and Enterococcus faecalis. Patient has been evaluated by ID and currently receiving antibiotics. Her COVID-19 was negative. On admission her platelet count noted to be 278, hemoglobin 15.5 and WBC 8.1. Yesterday dropped to 104, 265 and today, the platelet count is down to 34K  Her coags are normal.  Patient did not receive any IV heparin but did receive Lovenox 30 mg twice daily since admission. Hematology consulted for evaluation of her thrombocytopenia and questionable HIT.   Patient reports she has been following with hematologist in the past for her history of antiphospholipid antibody syndrome and hypercoagulable state. She has been maintained on Arixtra chronically. She does not recall history of HIT or heparin allergy in the past.    Past Medical History:   has a past medical history of Alcohol abuse, Anxiety, Arthritis, Painting esophagus, Benzodiazepine overdose, Bipolar disorder, unspecified ([address] Bipolar I disorder, most recent episode depressed, severe without psychotic features ([address] Cellulitis, Chronic abdominal wound infection, Constipation, Depression, Drug overdose, intentional ([address] Genital herpes, unspecified, GERD (gastroesophageal reflux disease), History of pulmonary embolism, Hx of blood clots, Hypertension, Iron deficiency anemia, Isopropyl alcohol poisoning, Lumbosacral spondylosis without myelopathy, MDRO (multiple drug resistant organisms) resistance, Miscarriage, Morbid obesity ([address] MRSA (methicillin resistant staph aureus) culture positive, Overdose of benzodiazepine, Pernicious anemia, Primary hypercoagulable state ([address] Pulmonary embolism ([address] Suicidal behavior, Suicidal ideation, Suicide attempt ([address] SVT (supraventricular tachycardia) ([address] Toxic effect of ethanol, intentional self-harm ([facility] [facility] and Type 2 diabetes mellitus, with long-term current use of insulin ([address] Past Surgical History:   has a past surgical history that includes Cholecystectomy; Liver Resection; hernia repair; Tonsillectomy; Endoscopy, colon, diagnostic; Abdominal hernia repair (2014); Abdominal hernia repair (11/3/14); Bariatric Surgery (2013); Dilation and curettage of uterus (2005); Finger amputation (Left, 02/09/2015); lymph node biopsy (1990); yariel and bso (cervix removed) (2011); and IVC filter insertion. Medications:    Prior to Admission medications    Medication Sig Start Date End Date Taking?  Authorizing Provider   FLUoxetine (PROZAC) 20 MG capsule Take 2 capsules by mouth daily 3/19/22  Yes Deepti Daley MD   busPIRone (BUSPAR) 5 MG tablet Take 2 tablets by mouth 3 times daily 3/19/22 4/18/22 Yes Deepti Daley MD   levoFLOXacin (LEVAQUIN) 750 MG tablet Take 1 tablet by mouth daily for 1 day 3/19/22 3/20/22 Yes Solo Carbajal MD   ondansetron (ZOFRAN ODT) 4 MG disintegrating tablet Take 1 tablet by mouth every 8 hours as needed for Nausea 2/1/22   Urbano Menon DO   benazepril (LOTENSIN) 20 MG tablet TAKE 1 TABLET BY MOUTH DAILY 7/21/21   Bulmaro Lynch MD   famotidine (PEPCID) 20 MG tablet TAKE 1 TABLET BY MOUTH TWICE DAILY 4/22/21   MD MISHA Saleem SOLOSTAR 300 UNIT/ML SOPN INJECT 70 UNITS INTO THE SKIN EVERY MORNING 3/30/21   Sandy Rogel MD   acyclovir (ZOVIRAX) 5 % ointment Apply topically every 3 hours x 10 days. 3/30/21   Bulmaro Lynch MD   fondaparinux (ARIXTRA) 10 MG/0.8ML SOLN injection Inject 0.8 mLs into the skin daily 3/30/21   Bulmaro Lynch MD   cyanocobalamin 1000 MCG/ML injection Inject 1 mL into the muscle every 7 days 2/13/21   Bulmaro Lynch MD   Syringe/Needle, Disp, (SYRINGE 3CC/25GX1\") 25G X 1\" 3 ML MISC To be used with B12 injections monthly 2/13/21   Bulmaro Lynch MD   tiZANidine (ZANAFLEX) 4 MG tablet TAKE 1 TABLET BY MOUTH TWICE DAILY AS NEEDED FOR BACK PAIN 12/27/20   Bulmaro Lynch MD   FARXIGA 10 MG tablet TAKE 1 TABLET BY MOUTH EVERY MORNING 11/2/20   Sandy Rogel MD   sucralfate (CARAFATE) 1 GM tablet DISSOLVE 1 TABLET INTO 15 ML( 1 TABLESPOON) OF WATER AND DRINK BY MOUTH FOUR TIMES DAILY AS NEEDED FOR GERD 7/27/20   Bulmaro Lynch MD   dilTIAZem (DILTIAZEM CD) 240 MG extended release capsule Take 240 mg by mouth daily Take one capsule by mouth daily.     Historical Provider, MD   pravastatin (PRAVACHOL) 40 MG tablet TAKE 1 TABLET(40 MG) BY MOUTH DAILY 6/26/20   Bulmaro Lynch MD   valACYclovir (VALTREX) 500 MG tablet Take 1 tablet by mouth daily 6/8/20   Bulmaro Lynch MD   ondansetron (ZOFRAN) 4 MG tablet Take 1 tablet by mouth 3 times daily as needed for Nausea or Vomiting 4/27/20   Mitra Coyle MD   acetaminophen (TYLENOL) 500 MG tablet Take 2 tablets by mouth every 8 hours as needed for Pain 2/19/20   Ayana Dutton DO   GLUCAGEN HYPOKIT 1 MG SOLR injection  12/19/19   Historical Provider, MD   glucose monitoring kit (FREESTYLE) monitoring kit Testing twice a day. Please dispense according to patients insurance. 12/20/19   Ramos Mojica MD   Insulin Pen Needle 31G X 5 MM MISC 1 each by Does not apply route daily 12/20/19   Ramos Mojica MD   Lancets 30G MISC Testing twice a day. Please dispense according to patients insurance. 12/20/19   Ramos Mojica MD   blood glucose monitor strips Testing twice a day. Please dispense according to patients insurance. 12/20/19   Ramos Mojica MD   KLOR-CON M10 10 MEQ extended release tablet Take 2 tablets by mouth daily as needed (take with bumex) 12/20/19   Ramos Mojica MD   bumetanide (BUMEX) 0.5 MG tablet TAKE 1 TABLET BY MOUTH DAILY AS NEEDED FOR LEG SWELLING 12/20/19   Rmaos Mojica MD   amphetamine-dextroamphetamine (ADDERALL, 30MG,) 30 MG tablet Take 30 mg by mouth daily.     Historical Provider, MD   dicyclomine (BENTYL) 10 MG capsule Take 1 capsule by mouth every 6 hours as needed (cramps) 11/3/19   Emma Irvin MD   Multiple Vitamins-Minerals (THERAPEUTIC MULTIVITAMIN-MINERALS) tablet Take 1 tablet by mouth daily    Historical Provider, MD   vitamin D (CHOLECALCIFEROL) 1000 UNIT TABS tablet Take 10,000 Units by mouth daily 5 days a week    Historical Provider, MD     Current Facility-Administered Medications   Medication Dose Route Frequency Provider Last Rate Last Admin    dilTIAZem (CARDIZEM CD) extended release capsule 180 mg  180 mg Oral Daily Roseanne Shearer MD        levoFLOXacin (LEVAQUIN) tablet 750 mg  750 mg Oral Daily Silverio Joseph MD   750 mg at 03/18/22 1732    guaiFENesin (MUCINEX) extended release tablet 600 mg  600 mg Oral BID BERNARDA Zuluaga - CNP   600 mg at 03/19/22 0900    potassium chloride (KLOR-CON M) extended release tablet 40 mEq  40 mEq Oral PRN Jigar Rasmussen MD   40 mEq at 03/18/22 1412    Or    potassium bicarb-citric acid (EFFER-K) effervescent tablet 40 mEq  40 mEq Oral PRN Jigar Rasmussen MD        Or    potassium chloride 10 mEq/100 mL IVPB (Peripheral Line)  10 mEq IntraVENous PRN Jigar Rasmussen MD        chlordiazePOXIDE (LIBRIUM) capsule 10 mg  10 mg Oral TID Jenise Mcneal MD   10 mg at 03/19/22 0908    insulin glargine (LANTUS) injection vial 50 Units  50 Units SubCUTAneous Daily Bonnie Fragoso MD   50 Units at 03/19/22 0900    pravastatin (PRAVACHOL) tablet 40 mg  40 mg Oral Nightly Luisa Santana MD   40 mg at 03/18/22 2144    pantoprazole (PROTONIX) 40 mg in sodium chloride (PF) 10 mL injection  40 mg IntraVENous Daily Baldemar Strange MD   40 mg at 03/19/22 0910    sodium chloride flush 0.9 % injection 10 mL  10 mL IntraVENous PRN Baldemar Strange MD        fondaparinux (ARIXTRA) injection 10 mg  10 mg SubCUTAneous Daily Flakita lElison MD   10 mg at 03/19/22 0910    HYDROcodone-acetaminophen (NORCO) 5-325 MG per tablet 1 tablet  1 tablet Oral Q6H PRN BERNARDA Moncada Cha - CNP   1 tablet at 03/19/22 0527    insulin lispro (HUMALOG) injection vial 0-18 Units  0-18 Units SubCUTAneous TID WC Bonnie Fragoso MD   3 Units at 03/18/22 1734    insulin lispro (HUMALOG) injection vial 0-9 Units  0-9 Units SubCUTAneous Nightly Bonnie Fragoso MD   2 Units at 03/15/22 2044    perflutren lipid microspheres (DEFINITY) injection 1.65 mg  1.5 mL IntraVENous ONCE PRN Lida Monsalve MD        miconazole (MICOTIN) 2 % powder   Topical BID Bonnie Fragoso MD   Given at 03/19/22 1041    metoprolol (LOPRESSOR) injection 5 mg  5 mg IntraVENous Q6H PRN Bonnie Fragoso MD   5 mg at 03/14/22 1630    lamoTRIgine (LAMICTAL) tablet 200 mg  200 mg Oral Daily Kirstin Torres MD   200 mg at 03/19/22 0910    buPROPion (WELLBUTRIN XL) extended release tablet 300 mg Jumana Torres MD   650 mg at 03/14/22 0449    sodium chloride flush 0.9 % injection 5-40 mL  5-40 mL IntraVENous 2 times per day Paulie Steward MD   10 mL at 03/19/22 0038       Allergies:  Hale Lair [aspirin], Betadine [povidone iodine], Celexa [citalopram hydrobromide], Citalopram, Lasix [furosemide], Macrobid [nitrofurantoin], Norco [hydrocodone-acetaminophen], Betadine [povidone iodine], Codeine, Lithium, Paroxetine, Paxil [paroxetine hcl], Tape [adhesive tape], and Tegretol [carbamazepine]    Social History:   reports that she has never smoked. She has never used smokeless tobacco. She reports that she does not drink alcohol and does not use drugs. Family History: family history includes Breast Cancer in her maternal aunt and maternal cousin; Cancer in her maternal uncle; Depression in her mother; Diabetes in her father; Heart Disease in her father; High Blood Pressure in her father and mother; High Cholesterol in her father and mother; Kidney Disease in her father. REVIEW OF SYSTEMS:    Constitutional: No fever or chills. No night sweats, no weight loss   Eyes: No eye discharge, double vision, or eye pain   HEENT: negative for sore mouth, sore throat, hoarseness and voice change   Respiratory: negative for cough , sputum, dyspnea, wheezing, hemoptysis, chest pain   Cardiovascular: negative for chest pain, dyspnea, palpitations, orthopnea, PND   Gastrointestinal: negative for nausea, vomiting, diarrhea, constipation, abdominal pain, Dysphagia, hematemesis and hematochezia   Genitourinary: negative for frequency, dysuria, nocturia, urinary incontinence, and hematuria   Integument: negative for rash, skin lesions, bruises.    Hematologic/Lymphatic: negative for easy bruising, bleeding, lymphadenopathy, or petechiae   Endocrine: negative for heat or cold intolerance,weight changes, change in bowel habits and hair loss   Musculoskeletal: negative for myalgias, arthralgias, pain, joint swelling,and bone pain Neurological: negative for headaches, dizziness, seizures, weakness, numbness    PHYSICAL EXAM:      BP 99/61   Pulse 105   Temp 98.3 °F (36.8 °C) (Oral)   Resp 18   Ht 5' 8\" (1.727 m)   Wt (!) 306 lb 3.5 oz (138.9 kg)   SpO2 95%   BMI 46.56 kg/m²    Temp (24hrs), Av.4 °F (36.9 °C), Min:98.1 °F (36.7 °C), Max:98.8 °F (37.1 °C)    General appearance - well appearing, no in pain or distress   Mental status - alert and cooperative   Eyes - pupils equal and reactive, extraocular eye movements intact   Ears - bilateral TM's and external ear canals normal   Mouth - mucous membranes moist, pharynx normal without lesions   Neck - supple, no significant adenopathy   Lymphatics - no palpable lymphadenopathy, no hepatosplenomegaly   Chest - clear to auscultation, no wheezes, rales or rhonchi, symmetric air entry   Heart - normal rate, regular rhythm, normal S1, S2, no murmurs  Abdomen - soft, nontender, nondistended, no masses or organomegaly   Neurological - alert, oriented, normal speech, no focal findings or movement disorder noted   Musculoskeletal - no joint tenderness, deformity or swelling   Extremities - peripheral pulses normal, no pedal edema, no clubbing or cyanosis   Skin - normal coloration and turgor, no rashes, no suspicious skin lesions noted ,    DATA:    Labs:   CBC:   Recent Labs     22  0528 22  0713   WBC 6.3 7.1   HGB 10.4* 10.6*   HCT 31.2* 32.2*   * 185     BMP:   Recent Labs     22  1045 22  0713    136   K 3.5* 3.1*   CO2 22 19*   BUN 6 6   CREATININE 0.57 0.58   LABGLOM >60 >60   GLUCOSE 177* 160*     PT/INR:   No results for input(s): PROTIME, INR in the last 72 hours. IMAGING DATA:  XR CHEST (2 VW)   Final Result   Similar appearance of left lower lobe pneumonia.          VL Lower Extremity Bilateral Venous Duplex   Final Result      CT CHEST PULMONARY EMBOLISM W CONTRAST   Final Result   Filling defects within the pulmonary arterial branches within the left lower   lobe and right lower lobe, concerning for mild burden of pulmonary embolism. No cardiac strain      Extensive consolidative changes within the left lower lobe and other   scattered areas of mild focal areas of consolidation within bilateral upper   lobes. Findings are concerning for multifocal pneumonia. For detailed description of visualized abdominal findings please refer to the   abdominal CT of 03/13/2022. Eze Rangel RECOMMENDATIONS:   Unavailable         XR CHEST (2 VW)   Final Result   Increasing infiltration in the left lung base, in keeping with pneumonia. RECOMMENDATION:         CT ABDOMEN PELVIS W IV CONTRAST Additional Contrast? Radiologist Recommendation   Final Result   1. Multifocal pneumonia, most pronounced in the left lower lobe. 2. Inflammatory changes surrounding the pancreas concerning for acute   pancreatitis. No focal fluid collection. 3. Fatty liver. 4. Anasarca. 5. Presacral edema without a discrete abscess. 6. Inflammatory changes surround the bladder, concerning for underlying   infection. 7. No bowel obstruction. XR CHEST PORTABLE   Final Result   Intervally placed right upper extremity PICC distal tip projects over the   right atrium. No discrete pneumothorax. Left basilar opacities persist and appear mildly worsened, concerning for   developing infection. Aspiration may have a similar appearance. Low lung volumes. CT Head WO Contrast   Final Result   No acute intracranial abnormality. XR CHEST PORTABLE   Final Result   Low lung volumes. Diffuse interstitial opacities in the perihilar areas with   left perihilar and lower lobe alveolar type opacities consistent with   multifocal airspace disease.              Primary Problem  Multifocal pneumonia    Active Hospital Problems    Diagnosis Date Noted    Acute pulmonary embolism ([address] [I26.99] 03/17/2022    Acute pancreatitis [K85.90] 03/14/2022    Multifocal pneumonia [J18.9] 75/99/0879    Alcoholic hepatitis [C13.05] 03/14/2022    Alcohol intoxication ([facility] [F10.929] 12/16/2015    Antiphospholipid syndrome ([facility] [D68.61]     Type 2 diabetes mellitus with diabetic polyneuropathy, with long-term current use of insulin ([facility] [E11.42, Z79.4] 07/04/2015       IMPRESSION:   1. Altered mental status   2. alcohol intoxication  3. Multifocal pneumonia  4. Diabetic ketoacidosis  5. Urinary tract infection  6. Thrombocytopenia, overall given clinical picture, timeline suspicion for HIT is very low. 7. History of antiphospholipid antibody syndrome: Patient has been on chronic anticoagulation with Arixtra and therefore is a hypercoagulable state and would recommend resuming anticoagulation soon with Arixtra  8. Acute pancreatitis  9. History of gastric bypass      RECOMMENDATIONS:  1. I reviewed [facility], imaging studies, discussed possible diagnosis and treatment recommendations  2. Heparin antibody test negative  3. TCP likley due to infection. 4.  Abx as per ID  5. No evidence of DIC or TTP  6. Acute PE noted on recent CT  7. Arixtra restarted   8. Given her history of antiphospholipid antibody syndrome, patient is highly prothrombotic and has been following with hematologist in the past she has been maintained on Arixtra at home  9. Other management as per primary team  10. Monitor counts closel    Platelets back to normal will sign off and follow-up as an outpatient  Patient cleared for discharge from hematology point    Discussed with patient and Nurse. Thank you for asking us to see this patient.                                     [facility]                            This note is created with the assistance of a speech recognition program.  While intending to generate a document that actually reflects the content of the visit, the document can still have some errors including those of syntax and sound a like substitutions which may escape proof reading. It such instances, actual meaning can be extrapolated by contextual diversion. Marquis Castillo

## 2022-03-19 NOTE — PROGRESS NOTES
Pulmonary Progress Note  [facility]      Patient - Sherine Wilcox,  Age - 42 y.o.    - 1980      Room Number - 2114/2114-01   MRN -  [id number]   Acct # - [de-identified]  Date of Admission -  3/12/2022  7:42 AM    Consulting Shayy Mancuso MD  Primary Care Physician - [facility]   Patient is resting quietly. She denies any increase shortness of breath or chest pain. OBJECTIVE   VITALS    height is 5' 8\" (1.727 m) and weight is 306 lb 3.5 oz (138.9 kg) (abnormal). Her oral temperature is 97.8 °F (36.6 °C). Her blood pressure is 84/76 and her pulse is 110. Her respiration is 18 and oxygen saturation is 97%. Body mass index is 46.56 kg/m². Temperature Range: Temp: 97.8 °F (36.6 °C) Temp  Av.1 °F (36.7 °C)  Min: 97.8 °F (36.6 °C)  Max: 98.3 °F (36.8 °C)  BP Range:  Systolic (31BOO), BYE:778 , Min:84 , JBT:564     Diastolic (79TAX), ZQL:70, Min:58, Max:76    Pulse Range: Pulse  Av.8  Min: 98  Max: 110  Respiration Range: Resp  Av  Min: 18  Max: 18  Current Pulse Ox[de-identified]  SpO2: 97 %  24HR Pulse Ox Range:  SpO2  Av.8 %  Min: 94 %  Max: 97 %  Oxygen Amount and Delivery: O2 Flow Rate (L/min): 3 L/min    Wt Readings from Last 3 Encounters:   22 (!) 306 lb 3.5 oz (138.9 kg)   22 280 lb (127 kg)   21 280 lb (127 kg)       I/O (24 Hours)    Intake/Output Summary (Last 24 hours) at 3/19/2022 1412  Last data filed at 3/19/2022 0041  Gross per 24 hour   Intake 560 ml   Output 700 ml   Net -140 ml       EXAM     General Appearance  Awake, alert, oriented, in no acute distress  HEENT - normocephalic, atraumatic. Neck - Supple,  trachea midline   Lungs -coarse breath sounds no crackles rales or wheeze  Heart Exam:PMI normal. No lifts, heaves, or thrills. RRR. No murmurs, clicks, gallops, or rubs  Abdomen Exam: Abdomen soft, non-tender.  BS normal. No masses,   Extremity Exam: No signs of cyanosis    MEDS      dilTIAZem  180 mg Oral Daily    levoFLOXacin  750 mg Oral Daily    guaiFENesin  600 mg Oral BID    chlordiazePOXIDE  10 mg Oral TID    insulin glargine  50 Units SubCUTAneous Daily    pravastatin  40 mg Oral Nightly    pantoprazole (PROTONIX) 40 mg injection  40 mg IntraVENous Daily    fondaparinux  10 mg SubCUTAneous Daily    insulin lispro  0-18 Units SubCUTAneous TID WC    insulin lispro  0-9 Units SubCUTAneous Nightly    miconazole   Topical BID    lamoTRIgine  200 mg Oral Daily    buPROPion  300 mg Oral QAM    FLUoxetine  20 mg Oral Daily    sodium chloride flush  5-40 mL IntraVENous 2 times per day    sodium chloride flush  5-40 mL IntraVENous 2 times per day      dextrose      sodium chloride 25 mL (03/17/22 0252)     potassium chloride **OR** potassium alternative oral replacement **OR** potassium chloride, sodium chloride flush, HYDROcodone 5 mg - acetaminophen, perflutren lipid microspheres, metoprolol, sodium chloride flush, glucose, dextrose, glucagon (rDNA), dextrose, magnesium sulfate, sodium phosphate IVPB **OR** sodium phosphate IVPB **OR** sodium phosphate IVPB, polyethylene glycol, sodium chloride flush, sodium chloride, ondansetron **OR** ondansetron, acetaminophen **OR** acetaminophen    LABS   CBC   Recent Labs     03/19/22  0713   WBC 7.1   HGB 10.6*   HCT 32.2*   MCV 91.5        BMP:   Lab Results   Component Value Date     03/19/2022    K 3.1 03/19/2022     03/19/2022    CO2 19 03/19/2022    BUN 6 03/19/2022    LABALBU 1.6 03/15/2022    LABALBU 3.9 06/03/2012    CREATININE 0.58 03/19/2022    CALCIUM 7.8 03/19/2022    GFRAA >60 03/19/2022    LABGLOM >60 03/19/2022     ABGs:  Lab Results   Component Value Date    PHART 7.437 01/20/2016    PO2ART 90.3 01/20/2016    NVE3SMA 40.7 01/20/2016      Lab Results   Component Value Date    MODE NOT REPORTED 09/25/2017     Ionized Calcium:  No results found for: IONCA  Magnesium:    Lab Results Component Value Date    MG 2.2 03/19/2022     Phosphorus:    Lab Results   Component Value Date    PHOS 4.3 03/19/2022        LIVER PROFILE No results for input(s): AST, ALT, LIPASE, BILIDIR, BILITOT, ALKPHOS in the last 72 hours. Invalid input(s): AMYLASE,  ALB  INR No results for input(s): INR in the last 72 hours.   PTT   Lab Results   Component Value Date    APTT 35.1 03/12/2022         RADIOLOGY     (See actual reports for details)    ASSESSMENT/PLAN     Patient Active Problem List   Diagnosis    Pernicious anemia    History of ulcer disease    History of DVT of lower extremity    Primary hypercoagulable state ([address]    Amputation finger    GERD (gastroesophageal reflux disease)    Alcohol dependence in remission ([address]    Type 2 diabetes mellitus with diabetic polyneuropathy, with long-term current use of insulin ([address]    Primary insomnia    Essential hypertension    Bipolar affective disorder in remission ([address]    History of pulmonary embolism    Antiphospholipid syndrome ([address]    Alcohol intoxication ([address]    Hypertriglyceridemia    Chronic post-traumatic stress disorder (PTSD)    OCD (obsessive compulsive disorder)    Severe benzodiazepine use disorder ([address]    Morbid obesity with BMI of 50.0-59.9, adult ([address]    History of MRSA infection    Pain of upper abdomen    Painting esophagus    NAFLD (nonalcoholic fatty liver disease)    Nondependent opioid abuse in remission ([address]    Osteopenia    History of Awa-en-Y gastric bypass    Herpes genitalis    History of drug abuse ([address]    Malabsorption    History of pulmonary embolus (PE)    Vitamin B 12 deficiency    Vitamin D deficiency    History of surgery of liver    Blood alkaline phosphatase increased compared with prior measurement    Paroxysmal SVT (supraventricular tachycardia) (HCC)    Anxiety    Hypomagnesemia    Chronic idiopathic constipation    Recurrent incisional hernia    History of small bowel obstruction  History of peptic ulcer    Fibromyalgia    Polyneuropathy    COVID-19 virus infection    Diabetic ketoacidosis without coma associated with type 2 diabetes mellitus ([facility]    Acute pancreatitis    Multifocal pneumonia    Alcoholic hepatitis    Acute pulmonary embolism (HCC)     Multifocal Community Acquired Pneumonia  Acute alcohol intoxication/ETOH abuse  Severe diabetic keto/metabolic acidosis (anion gap, lactic acid), resolved  History of hypercoagulable state, antiphospholipid antibody, maintained chronically on Arixtra  Bilateral lower lobe PE  Alcoholic Hepatitis  Pancreatitis  Morbid obesity, status post bariatric surgery 2013  SAMIR  Urinary tract infection, culture with Klebsiella and Enterococcus  Thrombocytopenia, improving  GAIL, resolved    O2 saturation 97% on room air    Patient per bedside RN report is being managed with Arixtra as an outpatient by hematology. Antibiotics per ID. Should the patient wish, I will be more than happy to follow with her as an outpatient either late April early May    [address] [place] [address]., [place]. [address] [place] [address],[address]  [phone].       Electronically signed by Heraclio Puckett MD on 3/19/2022 at 2:12 PM

## 2022-03-19 NOTE — PROGRESS NOTES
[address] [facility] [address] Cardiovascular Imaging    PROGRESS NOTE             3/19/2022    9:00 AM    Name:   Levi Cifuentes  MRN:     [id number]     Acct:      [de-identified]   Room:   [facility]  IP Day:  7  Admit Date:  3/12/2022  7:42 AM    PCP:  Tesfaye Hammond  Code Status:  Full Code    Subjective:     C/C:   Chief Complaint   Patient presents with    Alcohol Intoxication     Interval History Status: improved. Patient was seen and examined at bedside. No acute events overnight. She is feeling better today. Denies chest pain, shortness of breath, nausea, vomiting, weakness or numbness   Will discharge today    Review of Systems:     Review of Systems   Constitutional: Negative for fatigue and fever. Respiratory: Negative for cough and shortness of breath. Cardiovascular: Negative for chest pain, palpitations and leg swelling. Gastrointestinal: Negative for abdominal pain, diarrhea, nausea and vomiting. Genitourinary: Negative for dysuria and flank pain. Neurological: Negative for dizziness and numbness. Psychiatric/Behavioral: Negative for agitation and confusion. Medications: Allergies:     Allergies   Allergen Reactions    Asa [Aspirin]      Patient is on chronic anticoagulation    Betadine [Povidone Iodine]     Celexa [Citalopram Hydrobromide]     Citalopram     Lasix [Furosemide]      Tolerates Bumex    Macrobid [Nitrofurantoin]     Norco [Hydrocodone-Acetaminophen] Hives    Betadine [Povidone Iodine] Rash    Codeine Rash    Lithium Nausea And Vomiting    Paroxetine Rash    Paxil [Paroxetine Hcl] Rash    Tape Olga Brass Tape] Rash     Paper tape    Tegretol [Carbamazepine] Rash       Current Meds:   Scheduled Meds:    levoFLOXacin  750 mg Oral Daily    guaiFENesin  600 mg Oral BID    chlordiazePOXIDE  10 mg Oral TID    insulin glargine  50 Units SubCUTAneous Daily    pravastatin  40 mg Oral Nightly    dilTIAZem  30 mg Oral 3 times per day    pantoprazole (PROTONIX) 40 mg injection  40 mg IntraVENous Daily    fondaparinux  10 mg SubCUTAneous Daily    insulin lispro  0-18 Units SubCUTAneous TID WC    insulin lispro  0-9 Units SubCUTAneous Nightly    miconazole   Topical BID    lamoTRIgine  200 mg Oral Daily    buPROPion  300 mg Oral QAM    FLUoxetine  20 mg Oral Daily    sodium chloride flush  5-40 mL IntraVENous 2 times per day    sodium chloride flush  5-40 mL IntraVENous 2 times per day     Continuous Infusions:    dextrose      sodium chloride 25 mL (03/17/22 0252)     PRN Meds: potassium chloride **OR** potassium alternative oral replacement **OR** potassium chloride, sodium chloride flush, HYDROcodone 5 mg - acetaminophen, perflutren lipid microspheres, metoprolol, sodium chloride flush, glucose, dextrose, glucagon (rDNA), dextrose, magnesium sulfate, sodium phosphate IVPB **OR** sodium phosphate IVPB **OR** sodium phosphate IVPB, polyethylene glycol, sodium chloride flush, sodium chloride, ondansetron **OR** ondansetron, acetaminophen **OR** acetaminophen    Data:     Past Medical History:   has a past medical history of Alcohol abuse, Anxiety, Arthritis, Painting esophagus, Benzodiazepine overdose, Bipolar disorder, unspecified ([facility] [address] [address] Bipolar I disorder, most recent episode depressed, severe without psychotic features ([facility] [address] Cellulitis, Chronic abdominal wound infection, Constipation, Depression, Drug overdose, intentional ([facility] [address] Genital herpes, unspecified, GERD (gastroesophageal reflux disease), History of pulmonary embolism, Hx of blood clots, Hypertension, Iron deficiency anemia, Isopropyl alcohol poisoning, Lumbosacral spondylosis without myelopathy, MDRO (multiple drug resistant organisms) resistance, Miscarriage, Morbid obesity ([address] MRSA (methicillin resistant staph aureus) culture positive, Overdose of benzodiazepine, Pernicious anemia, Primary hypercoagulable state ([facility] [address] [facility] Suicidal behavior, Suicidal ideation, Suicide attempt ([facility] [address] SVT (supraventricular tachycardia) ([facility] [address] Toxic effect of ethanol, intentional self-harm ([facility] [address] and Type 2 diabetes mellitus, with long-term current use of insulin ([facility] [address] Social History:   reports that she has never smoked. She has never used smokeless tobacco. She reports that she does not drink alcohol and does not use drugs. Family History:   Family History   Problem Relation Age of Onset    Depression Mother     High Blood Pressure Mother     High Cholesterol Mother     Diabetes Father     Heart Disease Father     Kidney Disease Father     High Blood Pressure Father     High Cholesterol Father     Cancer Maternal Uncle         of duodenum had whipple    Breast Cancer Maternal Aunt     Breast Cancer Maternal Cousin        Vitals:  BP 99/61   Pulse 105   Temp 98.3 °F (36.8 °C) (Oral)   Resp 18   Ht 5' 8\" (1.727 m)   Wt (!) 306 lb 3.5 oz (138.9 kg)   SpO2 95%   BMI 46.56 kg/m²   Temp (24hrs), Av.4 °F (36.9 °C), Min:98.1 °F (36.7 °C), Max:98.8 °F (37.1 °C)    Recent Labs     22  1557 22  2159 22  2332 22  0642   POCGLU 147* 86 109* 109*       I/O(24Hr):     Intake/Output Summary (Last 24 hours) at 3/19/2022 0900  Last data filed at 3/19/2022 0041  Gross per 24 hour   Intake 560 ml   Output 700 ml   Net -140 ml       Labs:  [unfilled]    Lab Results   Component Value Date/Time    SPECIAL NOT REPORTED 2019 07:52 PM     Lab Results   Component Value Date/Time    CULTURE KLEBSIELLA PNEUMONIAE >667792 CFU/ML (A) 2022 09:36 AM    CULTURE ENTEROCOCCUS FAECALIS >535885 CFU/ML (A) 2022 09:36 AM       [unfilled]    Radiology:    ECHO Complete 2D W Doppler W Color    Result Date: 3/14/2022  [address] Transthoracic Echocardiography Report (TTE)  Patient Name Melissa Merino        Date of Study                 2022               CHRISTOPHER FONTENOT   Date of      1980  Gender Female  Birth   Age          42 year(s)  Race                             Room Number  2009        Height:                       68 inch, 172.72 cm   Corporate ID [id number]    Weight:                       280 pounds, 127 kg  #   Patient Acct [de-identified]   BSA:           2.36 m^2       BMI:      42.57  #                                                                kg/m^2   MR #         [id number]      Sonographer                   Teagan Pablo   Accession #  [id number]  Interpreting Physician        [address] [place] [address]   Fellow                   Referring Nurse Practitioner   Interpreting             Referring Physician           Roxy Cole  Type of Study   TTE procedure:2D Echocardiogram, M-Mode, Doppler, Color Doppler. Procedure Date Date: 03/14/2022 Start: 12:25 PM Study Location: [facility] Technical Quality: Fair visualization Indications:LV Function, PSVT and Atrial fibrillation. Patient Status: Inpatient Height: 68 inches Weight: 280.01 pounds BSA: 2.36 m^2 BMI: 42.57 kg/m^2 Rhythm: Within normal limits BP: 126/58 mmHg CONCLUSIONS Summary Normal left ventricle size and function with an estimated EF > 55%. No segmental wall motion abnormalities seen. Mild left ventricular hypertrophy. Normal right ventricular size and function. No significant valvular regurgitation or stenosis seen. No significant pericardial effusion is seen. Signature ----------------------------------------------------------------------------  Electronically signed by Teagan Pablo(Sonographer) on 03/14/2022 01:01  PM ---------------------------------------------------------------------------- ----------------------------------------------------------------------------  Electronically signed by Toni RandleInterpreting physician) on 03/14/2022  01:11 PM ---------------------------------------------------------------------------- FINDINGS Left Atrium Left atrium is normal in size.  Left Ventricle Normal left ventricle size and function with an estimated EF > 55%. No segmental wall motion abnormalities seen. Mild left ventricular hypertrophy. Right Atrium Right atrium is normal in size. Right Ventricle Normal right ventricular size and function. Mitral Valve Normal mitral valve structure and function. Trivial mitral regurgitation. Aortic Valve Aortic valve was not well visualized. No aortic insufficiency. No aortic stenosis. Tricuspid Valve Normal tricuspid valve structure and function. Insignificant tricuspid regurgitation, unable to estimate RVSP. Pulmonic Valve Pulmonic valve not well visualized but Doppler velocities are normal. No pulmonic insufficiency. Pericardial Effusion No significant pericardial effusion is seen. Miscellaneous Normal aortic root dimension. E/e' average 8.5 IVC not well visualized.  M-mode / 2D Measurements & Calculations:   LVIDd:4.69 cm(3.7 - 5.6 cm)       Diastolic QBNGYB:105 ml  WWFCE:6.04 cm(2.2 - 4.0 cm)       Systolic MYOUXQ:05.9 ml  CVWL:9.77 cm(0.6 - 1.1 cm)        Aortic Root:3.2 cm(2.0 - 3.7 cm)  LVPWd:1.31 cm(0.6 - 1.1 cm)       LA Dimension: 3 cm(1.9 - 4.0 cm)  Fractional Shortenin.67 %     LA volume/Index: 28.3 ml /12m^2  Calculated LVEF (%): 55.44 %      LVOT:1.9 cm   Mitral:                              Aortic   Peak E-Wave: 0.63 m/s                Peak Velocity: 1.51 m/s  Peak A-Wave: 0.74 m/s                Mean Velocity: 1.09 m/s  E/A Ratio: 0.86                      Peak Gradient: 9.12 mmHg  Peak Gradient: 1.59 mmHg             Mean Gradient: 6 mmHg  Deceleration Time: 169 msec                                        Area (continuity): 2.15 cm^2                                       AV VTI: 23.6 cm  Septal Wall E' velocity:0.06 m/s Lateral Wall E' velocity:0.11 m/s    ECHO Limited    Result Date: 3/17/2022  [address] [facility] Transthoracic Echocardiography Report (TTE)  Patient Name Melissa Merino Dr       Date of Study                 2022 CHRISTOPHER FONTENOT   Date of      1980  Gender                        Female  Birth   Age          42 year(s)  Race                             Room Number  2114        Height:                       68 inch, 172.72 cm   Corporate ID [id number]    Weight:                       280 pounds, 127 kg  #   Patient Acct [de-identified]   BSA:           2.36 m^2       BMI:      42.57  #                                                                kg/m^2   MR #         [id number]      Iram Anderson   Accession #  [id number]  Interpreting Physician        [facility]   Fellow                   Referring Nurse Practitioner   Interpreting             Referring Physician           Justice Santana  Fellow  Type of Study   TTE procedure:2D Echocardiogram.  Procedure Date Date: 03/17/2022 Start: 02:06 PM Study Location: [address] [place] [address] Technical Quality: Fair visualization Indications:Pulmonary embolus. Patient Status: Inpatient Height: 68 inches Weight: 280.01 pounds BSA: 2.36 m^2 BMI: 42.57 kg/m^2 Rhythm: Within normal limits HR: 110 bpm BP: 107/65 mmHg CONCLUSIONS Summary Limited study ordered post pulmonary embolism. Normal LV function. Normal right ventricle size and function. Normal TAPSE. No significant change compared with echo of 3/14/22.  Signature ----------------------------------------------------------------------------  Electronically signed by Teagan Pablo(Sonographer) on 03/17/2022 02:34  PM ---------------------------------------------------------------------------- ----------------------------------------------------------------------------  Electronically signed by Toni RandleInterpreting physician) on 03/17/2022  08:21 PM ----------------------------------------------------------------------------    XR CHEST (2 VW)    Result Date: 3/18/2022  EXAMINATION: TWO XRAY VIEWS OF THE CHEST 3/18/2022 11:31 am COMPARISON: Chest x-ray dated 16 March 2022 HISTORY: [id number] PROVIDED HISTORY: Follow-up pneumonia TECHNOLOGIST PROVIDED HISTORY: Follow-up pneumonia Reason for Exam: follow-up pneumonia FINDINGS: Similar appearance of left basilar pneumonia. Right lung is clear. No pneumothorax or pleural effusion. Stable right-sided PICC line. Similar appearance of left lower lobe pneumonia. XR CHEST (2 VW)    Result Date: 3/16/2022  EXAMINATION: TWO XRAY VIEWS OF THE CHEST 3/16/2022 9:35 am COMPARISON: 03/13/2022 HISTORY: ORDERING SYSTEM PROVIDED HISTORY: Evaluate for pneumonia TECHNOLOGIST PROVIDED HISTORY: Evaluate for pneumonia Reason for Exam: Evaluate for pneumonia FINDINGS: As compared to prior examination, there is increasing infiltration noted in the the left lung base. Right lung appears clear. Heart and mediastinal structures appear normal.  There is a PICC line in place with distal tip overlying the junction of the superior vena cava and right atrium. Bony structures are unremarkable. Increasing infiltration in the left lung base, in keeping with pneumonia. RECOMMENDATION:     CT Head WO Contrast    Result Date: 3/12/2022  EXAMINATION: CT OF THE HEAD WITHOUT CONTRAST 3/12/2022 8:41 am TECHNIQUE: CT of the head was performed without the administration of intravenous contrast. Dose modulation, iterative reconstruction, and/or weight based adjustment of the mA/kV was utilized to reduce the radiation dose to as low as reasonably achievable. COMPARISON: Head CT 12/01/2020 HISTORY: ORDERING SYSTEM PROVIDED HISTORY: reported ams TECHNOLOGIST PROVIDED HISTORY: reported ams Decision Support Exception - unselect if not a suspected or confirmed emergency medical condition->Emergency Medical Condition (MA) Is the patient pregnant?->No Reason for Exam: reported ams Additional signs and symptoms: PT UNRESPONSIVE FINDINGS: BRAIN/VENTRICLES:  No masses nor acute intracranial hemorrhage. Intact gray/white matter differentiation without findings of acute ischemia.   No mass effect nor midline shift. Patent basilar cisterns and foramen magnum. No hydrocephalus. Minimal cerebral atrophy. ORBITS:  Normal without acute abnormality. SINUSES:  Layering fluid in portions of the left frontal and bilateral ethmoid sinuses. Otherwise normally pneumatized and aerated. SOFT TISSUES/SKULL:  No acute soft tissue abnormality. No acute fracture. 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 Edentulous. No acute intracranial abnormality. CT ABDOMEN PELVIS W IV CONTRAST Additional Contrast? Radiologist Recommendation    Result Date: 3/14/2022  EXAMINATION: CT OF THE ABDOMEN AND PELVIS WITH CONTRAST 3/13/2022 11:38 pm TECHNIQUE: CT of the abdomen and pelvis was performed with the administration of intravenous contrast. Multiplanar reformatted images are provided for review. Dose modulation, iterative reconstruction, and/or weight based adjustment of the mA/kV was utilized to reduce the radiation dose to as low as reasonably achievable. COMPARISON: 02/01/2022 HISTORY: ORDERING SYSTEM PROVIDED HISTORY: Looking for indraabdominal infection source TECHNOLOGIST PROVIDED HISTORY: Looking for indraabdominal infection source Reason for Exam: Looking for indraabdominal infection source Additional signs and symptoms: Fever, h/o multiple abdominal surgeries. Relevant Medical/Surgical History: H/O hernia repair (multiple), hysterectomy, cholecystectomy, gastric bypass, liver resection. FINDINGS: Lower Chest: There is a large confluent area of consolidation in the left lower lobe, compatible with pneumonia. More subtle infiltrate is noted in the right middle lobe. Heart size is normal.  No pericardial effusion. Organs: There is diffuse fatty infiltration of the liver. The previous peripherally calcified, central low-attenuation area along the left lobe of the liver is less conspicuous. Cholecystectomy clips are noted. The adrenal glands are unremarkable.   The pancreas has mild adjacent inflammatory changes which could be related to an acute pancreatitis. The spleen is unremarkable. The kidneys are unremarkable. There is no hydronephrosis. GI/Bowel: There is mild presacral edema which has increased. No discrete fluid collection is identified. No bowel obstruction. The appendix is not visualized. Postsurgical changes are noted along the anterior abdominal wall. There is sequela of gastric bypass. Pelvis: The bladder is decompressed. Inflammatory changes surround the bladder where underlying infection cannot be excluded. The uterus is surgically absent. There are no adnexal masses. No inguinal or pelvic sidewall adenopathy. Peritoneum/Retroperitoneum: The abdominal aorta is normal in caliber. No retroperitoneal adenopathy. There is an IVC filter. Bones/Soft Tissues: There is anasarca. No other areas of abnormal soft tissue swelling are identified. Degenerative changes are noted along the spine. Sclerotic bone islands are noted along the inferior pubic rami. No acute fracture. 1. Multifocal pneumonia, most pronounced in the left lower lobe. 2. Inflammatory changes surrounding the pancreas concerning for acute pancreatitis. No focal fluid collection. 3. Fatty liver. 4. Anasarca. 5. Presacral edema without a discrete abscess. 6. Inflammatory changes surround the bladder, concerning for underlying infection. 7. No bowel obstruction. XR CHEST PORTABLE    Result Date: 3/13/2022  EXAMINATION: ONE XRAY VIEW OF THE CHEST 3/13/2022 9:55 am COMPARISON: 03/12/2022 HISTORY: ORDERING SYSTEM PROVIDED HISTORY: Fever TECHNOLOGIST PROVIDED HISTORY: Fever Reason for Exam: fever FINDINGS: Right upper extremity PICC has been intervally placed and terminates over the right atrium. Cardiomediastinal silhouette is unchanged. Hazy left basilar opacities appear mildly worsened. The lungs are underinflated, resulting in vascular crowding and subsegmental atelectasis. .  No large pleural effusion or pneumothorax. Intervally placed right upper extremity PICC distal tip projects over the right atrium. No discrete pneumothorax. Left basilar opacities persist and appear mildly worsened, concerning for developing infection. Aspiration may have a similar appearance. Low lung volumes. XR CHEST PORTABLE    Result Date: 3/12/2022  EXAMINATION: ONE XRAY VIEW OF THE CHEST 3/12/2022 8:00 am COMPARISON: 15 December 2020 HISTORY: ORDERING SYSTEM PROVIDED HISTORY: cough TECHNOLOGIST PROVIDED HISTORY: cough Reason for Exam: cough FINDINGS: AP portable view of the chest time stamped at 820 hours demonstrates overlying cardiac monitoring electrodes. Lung volumes are low. Size is normal.  Interstitial perihilar opacities are noted with superimposed alveolar type opacities in the left perihilar area favoring acute airspace disease. No effusion or extrapleural air. Osseous structures are age-appropriate. Low lung volumes. Diffuse interstitial opacities in the perihilar areas with left perihilar and lower lobe alveolar type opacities consistent with multifocal airspace disease. CT CHEST PULMONARY EMBOLISM W CONTRAST    Result Date: 3/16/2022  EXAMINATION: CTA OF THE CHEST 3/16/2022 5:00 pm TECHNIQUE: CTA of the chest was performed after the administration of intravenous contrast.  Multiplanar reformatted images are provided for review. MIP images are provided for review. Dose modulation, iterative reconstruction, and/or weight based adjustment of the mA/kV was utilized to reduce the radiation dose to as low as reasonably achievable. COMPARISON: None. HISTORY: ORDERING SYSTEM PROVIDED HISTORY: Elevated D-Dimer TECHNOLOGIST PROVIDED HISTORY: Elevated D-Dimer Reason for Exam: Elevated D-Dimer, sob FINDINGS: Pulmonary Arteries: Pulmonary arteries are adequately opacified for evaluation.   Filling defects within the pulmonary arterial branches within the left lower lobe and right lower lobe, concerning for pulmonary embolism. Main pulmonary artery is normal in caliber. Mediastinum: No evidence of mediastinal lymphadenopathy. The heart and pericardium demonstrate no acute abnormality. There is no acute abnormality of the thoracic aorta. Lungs/pleura: Extensive consolidative changes within the left lower lobe and other scattered areas of mild focal areas of consolidation within bilateral upper lobes. Findings are concerning for multifocal pneumonia. .  No evidence of pleural effusion or pneumothorax. Upper Abdomen: Fatty liver. For detailed description of visualized abdominal findings please refer to the abdominal CT of 03/13/2022. Fabien Ricardo Soft Tissues/Bones: No acute bone or soft tissue abnormality. Filling defects within the pulmonary arterial branches within the left lower lobe and right lower lobe, concerning for mild burden of pulmonary embolism. No cardiac strain Extensive consolidative changes within the left lower lobe and other scattered areas of mild focal areas of consolidation within bilateral upper lobes. Findings are concerning for multifocal pneumonia. For detailed description of visualized abdominal findings please refer to the abdominal CT of 03/13/2022. Fabien Ricardo  RECOMMENDATIONS: Unavailable     VL Lower Extremity Bilateral Venous Duplex    Result Date: 3/19/2022    [address] [place] [address]  Vascular Lower Extremities DVT Study Procedure   Patient Name   Melissa Merino Dr        Date of Study           03/18/2022                 CHRISTOPHER FONTENOT   Date of Birth  1980   Gender                  Female   Age            42 year(s)   Race                       Room Number    2114         Height:                 68.11 inch, 173 cm   Corporate ID # [id number]     Weight:                 280 pounds, 127 kg   Patient Acct # [de-identified]    BSA:        2.36 m^2    BMI:      42.44 kg/m^2   MR #           [id number]       Sonographer             Ronda Díaz TRAMAINE   Accession #    [id number]   Interpreting Physician  Riley Reyes,Arthur   Referring                   Referring Physician     Cat Lamb  Nurse  Practitioner  Procedure Type of Study:   Veins: Lower Extremities DVT Study, Venous Scan Lower Bilateral.  Indications for Study:Pulmonary Embolism and Hx of DVT. Patient Status: In Patient. Technical Quality:Limited visualization. Conclusions   Summary   No evidence of superficial or deep venous thrombosis in both lower  extremities. Signature   ----------------------------------------------------------------  Electronically signed by Davis Fischer RVT(Sonographer) on  03/18/2022 10:29 AM  ----------------------------------------------------------------   ----------------------------------------------------------------  Electronically signed by King Rowan Reyes,Arthur(Interpreting  physician) on 03/19/2022 06:58 AM  ----------------------------------------------------------------  Findings:   Right Impression:                    Left Impression:  The common femoral, femoral,         The common femoral, femoral,  popliteal and tibial veins           popliteal and tibial veins  demonstrate normal compressibility   demonstrate normal compressibility  and augmentation. and augmentation. Limited visualization of the         Limited visualization of the peroneal  peroneal veins. veins. Normal compressibility of the great  Normal compressibility of the great  saphenous vein. saphenous vein. Normal compressibility of the small  Normal compressibility of the small  saphenous vein. saphenous vein. Risk Factors History +-----------------------+----------+---------------------------------------+ ! Diagnosis              ! Date      ! Comments                               ! +-----------------------+----------+---------------------------------------+ ! DVT                    !12/15/2015! bilateral PTV & gastroc n/c            ! +-----------------------+----------+---------------------------------------+ ! Green [employer]     !          !                                       ! +-----------------------+----------+---------------------------------------+ ! Other                  ! !Arixtra                                ! +-----------------------+----------+---------------------------------------+ ! Pulmonary Embolism     !          !                                       ! +-----------------------+----------+---------------------------------------+ Velocities are measured in cm/s ; Diameters are measured in cm Right Lower Extremities DVT Study Measurements Right 2D Measurements +------------------------------------+----------+---------------+----------+ ! Location                            ! Visualized! Compressibility! Thrombosis! +------------------------------------+----------+---------------+----------+ ! Common Femoral                      !Yes       ! Yes            ! None      ! +------------------------------------+----------+---------------+----------+ ! Prox Femoral                        !Yes       ! Yes            ! None      ! +------------------------------------+----------+---------------+----------+ ! Mid Femoral                         !Yes       ! Yes            ! None      ! +------------------------------------+----------+---------------+----------+ ! Dist Femoral                        !Yes       ! Yes            ! None      ! +------------------------------------+----------+---------------+----------+ ! Deep Femoral                        !Yes       ! Yes            ! None      ! +------------------------------------+----------+---------------+----------+ ! Popliteal                           !Yes       ! Yes            ! None      ! +------------------------------------+----------+---------------+----------+ ! Sapheno Femoral Junction            ! Yes       ! Yes            ! None      ! +------------------------------------+----------+---------------+----------+ ! PTV                                 ! Yes       ! Yes            ! None      ! +------------------------------------+----------+---------------+----------+ ! Peroneal                            !Partial   !Yes            ! None      ! +------------------------------------+----------+---------------+----------+ ! Gastroc                             ! Yes       ! Yes            ! None      ! +------------------------------------+----------+---------------+----------+ ! GSV Thigh                           ! Yes       ! Yes            ! None      ! +------------------------------------+----------+---------------+----------+ ! GSV Knee                            ! Yes       ! Yes            ! None      ! +------------------------------------+----------+---------------+----------+ ! GSV Ankle                           ! Yes       ! Yes            ! None      ! +------------------------------------+----------+---------------+----------+ ! SSV                                 ! Yes       ! Yes            ! None      ! +------------------------------------+----------+---------------+----------+ Left Lower Extremities DVT Study Measurements Left 2D Measurements +------------------------------------+----------+---------------+----------+ ! Location                            ! Visualized! Compressibility! Thrombosis! +------------------------------------+----------+---------------+----------+ ! Common Femoral                      !Yes       ! Yes            ! None      ! +------------------------------------+----------+---------------+----------+ ! Prox Femoral                        !Yes       ! Yes            ! None      ! +------------------------------------+----------+---------------+----------+ ! Mid Femoral                         !Yes       ! Yes            ! None      ! +------------------------------------+----------+---------------+----------+ ! Dist Femoral !Yes       !Yes            ! None      ! +------------------------------------+----------+---------------+----------+ ! Deep Femoral                        !Yes       ! Yes            ! None      ! +------------------------------------+----------+---------------+----------+ ! Popliteal                           !Yes       ! Yes            ! None      ! +------------------------------------+----------+---------------+----------+ ! Sapheno Femoral Junction            ! Yes       ! Yes            ! None      ! +------------------------------------+----------+---------------+----------+ ! PTV                                 ! Yes       ! Yes            ! None      ! +------------------------------------+----------+---------------+----------+ ! Peroneal                            !Partial   !Yes            ! None      ! +------------------------------------+----------+---------------+----------+ ! Gastroc                             ! Yes       ! Yes            ! None      ! +------------------------------------+----------+---------------+----------+ ! GSV Thigh                           ! Yes       ! Yes            ! None      ! +------------------------------------+----------+---------------+----------+ ! GSV Knee                            ! Yes       ! Yes            ! None      ! +------------------------------------+----------+---------------+----------+ ! GSV Ankle                           ! Yes       ! Yes            ! None      ! +------------------------------------+----------+---------------+----------+ ! SSV                                 ! Yes       ! Yes            ! None      ! +------------------------------------+----------+---------------+----------+        Physical Examination:        Physical Exam  Constitutional:       General: She is not in acute distress. Appearance: Normal appearance. HENT:      Head: Normocephalic and atraumatic. Cardiovascular:      Rate and Rhythm: Normal rate and regular rhythm.       Pulses: Normal pulses. Heart sounds: No murmur heard. Pulmonary:      Effort: Pulmonary effort is normal.      Breath sounds: Normal breath sounds. No wheezing, rhonchi or rales. Abdominal:      General: Abdomen is flat. Bowel sounds are normal. There is no distension. Palpations: Abdomen is soft. Tenderness: There is no abdominal tenderness. Musculoskeletal:      Right lower leg: No edema. Left lower leg: No edema. Neurological:      General: No focal deficit present. Mental Status: She is alert and oriented to person, place, and time.    Psychiatric:         Mood and Affect: Mood normal.           Assessment:        Primary Problem  Multifocal pneumonia    Active Hospital Problems    Diagnosis Date Noted    Acute pulmonary embolism ([facility] [I26.99] 03/17/2022    Acute pancreatitis [K85.90] 03/14/2022    Multifocal pneumonia [J18.9] 23/45/8594    Alcoholic hepatitis [X58.91] 03/14/2022    Alcohol intoxication ([facility] [F10.929] 12/16/2015    Antiphospholipid syndrome ([facility] [D68.61]     Type 2 diabetes mellitus with diabetic polyneuropathy, with long-term current use of insulin ([facility] [E11.42, Z79.4] 07/04/2015       Plan:        Sepsis secondary to multifocal pneumonia of left lower lobe versus UTI (improving):   · Afebrile, , WBC within normal limits   · On 3 L oxygen via nasal cannula  · CT chest: Findings of multifocal pneumonia of left lower lobe and bilateral upper lobes  · Respiratory panel negative and procalcitonin elevated 0.47  · Urine cultures growing Klebsiella pneumonia and Enterococcus faecalis, C. difficile negative, blood cultures no growth  · Infectious disease consulted: Levaquin IV  Multilobar pulmonary embolism 2/2 antiphospholipid syndrome:   · D-dimer elevated, positive CT findings; restarted home Arixtra  · Platelets 528Z on admission now 107k, trending upwards 3/16  · HIT panel negative  · Bilateral lower extremity venous duplex pending  · Hematology oncology consulted: Restarted on home Arixtra  Iron deficiency anemia (improving): Hemoglobin 15.5 on admission now 10.4, continue trending; fecal occult negative  Diabetes mellitus: Lantus 50 units subcutaneous daily, high-dose insulin sliding scale, hypoglycemia protocol; advancing to regular diet  Acute pancreatitis: Inflammatory changes surrounding the pancreas on CT abdomen pelvis, lipase downtrending, continue fluids  Alcohol abuse: Librium 10 mg 3 times daily, thiamine 50 mg IV  Alcoholic hepatitis: Elevated LFTs, downtrending; CT abdomen/pelvis showing fatty liver      Diabetic Ketoacidosis, 2/2 poor insulin compliance (resolved)  · Discontinued IV fluids and insulin drip  Atrial fibrillation (resolved): Cardiology consulted: Echo LVEF>55%, oral Cardizem 30 mg 3 times per day  GAIL (resolved): Creatinine 1.50 on admission, now 0.66; continue fluids     Comorbid conditions:  Depression: Wellbutrin, fluoxetine  Neuropathy: Lamictal  Hyperlipidemia: Pravastatin  Hypertension: Holding lisinopril  History of SVT: Diltiazem     DVT prophylaxis:  Arixtra 10 mg subcutaneous daily  GI prophylaxis:  Protonix 40 mg IV daily  Disposition: Likely home  Code: Full Code   Diet: ADULT DIET;  Regular; 4 carb choices (60 gm/meal)   Consults: IP CONSULT TO INTERNAL MEDICINE  IP CONSULT TO PULMONOLOGY  IP CONSULT TO SOCIAL WORK  IP CONSULT TO SOCIAL WORK  PHARMACY TO DOSE VANCOMYCIN  IP CONSULT TO CARDIOLOGY  IP CONSULT TO INFECTIOUS DISEASES  IP CONSULT TO HEM/ONC        Romayne Roughen, MD  3/19/2022  9:00 AM

## 2022-03-19 NOTE — PROGRESS NOTES
Today's Date: 3/18/2022  Patient Name: Juliette Mcqueen  Date of admission: 3/12/2022  7:42 AM  Patient's age: 42 y.o., 1980  Admission Dx: DKA, type 2, not at goal [facility]) [E11.10]    Reason for Consult: possible HIT  Requesting Physician: Danyel Brannon MD    CHIEF COMPLAINT:    Chief Complaint   Patient presents with    Alcohol Intoxication       SUBJECTIVE:    Pt seen and examined  Tolerating arixtra well  No bleeding symptoms  Plt are getting better  Today platelets are better  No fever chills    HISTORY OF PRESENT ILLNESS:    Juliette Mcqueen is a 42 y.o. female with a history of antiphospholipid antibody syndrome, chronically maintained on Arixtra, history of obesity and status post gastric bypass in 2013, who is admitted to the hospital on 3/12/2022  for alcohol intoxication. Patient reported to her heavy alcohol intake prior to her admission and brought in by her  with altered mental status and admitted for ICU management of her DKA. Patient has history of past medical history of anxiety, bipolar disorder, history of PE, hypertension, anemia and diabetes and suicidal ideation. Patient reportedly has not been compliant with her medications. On admission her ethanol level noted to be 404 and noted to have elevated liver enzymes. Her checks x-ray showed diffuse interstitial opacities and urine culture growing Klebsiella pneumoniae and Enterococcus faecalis. Patient has been evaluated by ID and currently receiving antibiotics. Her COVID-19 was negative. On admission her platelet count noted to be 278, hemoglobin 15.5 and WBC 8.1. Yesterday dropped to 104, 265 and today, the platelet count is down to 34K  Her coags are normal.  Patient did not receive any IV heparin but did receive Lovenox 30 mg twice daily since admission. Hematology consulted for evaluation of her thrombocytopenia and questionable HIT.   Patient reports she has been following with hematologist in the past for her history of antiphospholipid antibody syndrome and hypercoagulable state. She has been maintained on Arixtra chronically. She does not recall history of HIT or heparin allergy in the past.    Past Medical History:   has a past medical history of Alcohol abuse, Anxiety, Arthritis, Painting esophagus, Benzodiazepine overdose, Bipolar disorder, unspecified ([address] Bipolar I disorder, most recent episode depressed, severe without psychotic features ([address] Cellulitis, Chronic abdominal wound infection, Constipation, Depression, Drug overdose, intentional ([address] Genital herpes, unspecified, GERD (gastroesophageal reflux disease), History of pulmonary embolism, Hx of blood clots, Hypertension, Iron deficiency anemia, Isopropyl alcohol poisoning, Lumbosacral spondylosis without myelopathy, MDRO (multiple drug resistant organisms) resistance, Miscarriage, Morbid obesity ([address] MRSA (methicillin resistant staph aureus) culture positive, Overdose of benzodiazepine, Pernicious anemia, Primary hypercoagulable state ([address] Pulmonary embolism ([address] Suicidal behavior, Suicidal ideation, Suicide attempt ([address] SVT (supraventricular tachycardia) ([address] Toxic effect of ethanol, intentional self-harm ([address] and Type 2 diabetes mellitus, with long-term current use of insulin ([address] Past Surgical History:   has a past surgical history that includes Cholecystectomy; Liver Resection; hernia repair; Tonsillectomy; Endoscopy, colon, diagnostic; Abdominal hernia repair (2014); Abdominal hernia repair (11/3/14); Bariatric Surgery (2013); Dilation and curettage of uterus (2005); Finger amputation (Left, 02/09/2015); lymph node biopsy (1990); yariel and bso (cervix removed) (2011); and IVC filter insertion. Medications:    Prior to Admission medications    Medication Sig Start Date End Date Taking?  Authorizing Provider   ondansetron (ZOFRAN ODT) 4 MG disintegrating tablet Take 1 tablet by mouth every 8 hours as needed for Nausea 2/1/22 Betsey Home, DO   benazepril (LOTENSIN) 20 MG tablet TAKE 1 TABLET BY MOUTH DAILY 7/21/21   Michael Daily MD   famotidine (PEPCID) 20 MG tablet TAKE 1 TABLET BY MOUTH TWICE DAILY 4/22/21   MD MISHA SaleemOSTAR 300 UNIT/ML SOPN INJECT 70 UNITS INTO THE SKIN EVERY MORNING 3/30/21   Michael Daily MD   acyclovir (ZOVIRAX) 5 % ointment Apply topically every 3 hours x 10 days. 3/30/21   Michael Daily MD   fondaparinux (ARIXTRA) 10 MG/0.8ML SOLN injection Inject 0.8 mLs into the skin daily 3/30/21   Michael Daily MD   FLUoxetine (PROZAC) 20 MG capsule Take 1 capsule by mouth daily    Historical Provider, MD   cyanocobalamin 1000 MCG/ML injection Inject 1 mL into the muscle every 7 days 2/13/21   Michael Daily MD   Syringe/Needle, Disp, (SYRINGE 3CC/25GX1\") 25G X 1\" 3 ML MISC To be used with B12 injections monthly 2/13/21   Michael Daily MD   tiZANidine (ZANAFLEX) 4 MG tablet TAKE 1 TABLET BY MOUTH TWICE DAILY AS NEEDED FOR BACK PAIN 12/27/20   Michael Daily MD   FARXIGA 10 MG tablet TAKE 1 TABLET BY MOUTH EVERY MORNING 11/2/20   Michael Daily MD   pregabalin (LYRICA) 50 MG capsule Take 1 capsule by mouth 3 times daily for 7 days. 10/6/20 10/13/20  Sandy Rogel MD   sucralfate (CARAFATE) 1 GM tablet DISSOLVE 1 TABLET INTO 15 ML( 1 TABLESPOON) OF WATER AND DRINK BY MOUTH FOUR TIMES DAILY AS NEEDED FOR GERD 7/27/20   Michael Daily MD   dilTIAZem (DILTIAZEM CD) 240 MG extended release capsule Take 240 mg by mouth daily Take one capsule by mouth daily.     Historical Provider, MD   pravastatin (PRAVACHOL) 40 MG tablet TAKE 1 TABLET(40 MG) BY MOUTH DAILY 6/26/20   Michael Daily MD   valACYclovir (VALTREX) 500 MG tablet Take 1 tablet by mouth daily 6/8/20   Michael Daily MD   ondansetron (ZOFRAN) 4 MG tablet Take 1 tablet by mouth 3 times daily as needed for Nausea or Vomiting 4/27/20   Mamadou Allen MD   acetaminophen (TYLENOL) 500 MG tablet Take 2 tablets by mouth every 8 hours as needed for Pain 2/19/20   Ayana Vale,    LORazepam (ATIVAN) 2 MG tablet  12/18/19   Historical Provider, MD Lulu Banks 1 MG SOLR injection  12/19/19   Historical Provider, MD   glucose monitoring kit (FREESTYLE) monitoring kit Testing twice a day. Please dispense according to patients insurance. 12/20/19   Valentín Daniel MD   Insulin Pen Needle 31G X 5 MM MISC 1 each by Does not apply route daily 12/20/19   Valentín Daniel MD   Lancets 30G MISC Testing twice a day. Please dispense according to patients insurance. 12/20/19   Valentín Daniel MD   blood glucose monitor strips Testing twice a day. Please dispense according to patients insurance. 12/20/19   Valentín Daniel MD   KLOR-CON M10 10 MEQ extended release tablet Take 2 tablets by mouth daily as needed (take with bumex) 12/20/19   Valentín Daniel MD   bumetanide (BUMEX) 0.5 MG tablet TAKE 1 TABLET BY MOUTH DAILY AS NEEDED FOR LEG SWELLING 12/20/19   Valentín Daniel MD   lamoTRIgine (LAMICTAL) 200 MG tablet Take 200 mg by mouth daily    Historical Provider, MD   amphetamine-dextroamphetamine (ADDERALL, 30MG,) 30 MG tablet Take 30 mg by mouth daily.     Historical Provider, MD   dicyclomine (BENTYL) 10 MG capsule Take 1 capsule by mouth every 6 hours as needed (cramps) 11/3/19   Derian Koroma MD   QUEtiapine (SEROQUEL) 100 MG tablet Take 50 mg by mouth nightly as needed     Historical Provider, MD   Multiple Vitamins-Minerals (THERAPEUTIC MULTIVITAMIN-MINERALS) tablet Take 1 tablet by mouth daily    Historical Provider, MD   buPROPion (WELLBUTRIN XL) 300 MG extended release tablet Take 300 mg by mouth every morning    Historical Provider, MD   vitamin D (CHOLECALCIFEROL) 1000 UNIT TABS tablet Take 10,000 Units by mouth daily 5 days a week    Historical Provider, MD     Current Facility-Administered Medications   Medication Dose Route Frequency Provider Last Rate Last Admin  levoFLOXacin (LEVAQUIN) tablet 750 mg  750 mg Oral Daily Khushbu Castañeda MD   750 mg at 03/18/22 1732    guaiFENesin [facility]) extended release tablet 600 mg  600 mg Oral BID BERNARDA Zuluaga CNP   600 mg at 03/18/22 2510    potassium chloride (KLOR-CON M) extended release tablet 40 mEq  40 mEq Oral PRN Mynor Diamond MD   40 mEq at 03/18/22 1412    Or    potassium bicarb-citric acid (EFFER-K) effervescent tablet 40 mEq  40 mEq Oral PRN Mynro Diamond MD        Or   [facility] potassium chloride 10 mEq/100 mL IVPB (Peripheral Line)  10 mEq IntraVENous PRN Mynor Diamond MD        chlordiazePOXIDE (LIBRIUM) capsule 10 mg  10 mg Oral TID Tamara Mondragon MD   10 mg at 03/18/22 1412    insulin glargine (LANTUS) injection vial 50 Units  50 Units SubCUTAneous Daily Nurys Peace MD   50 Units at 03/18/22 0927    pravastatin (PRAVACHOL) tablet 40 mg  40 mg Oral Nightly Roger Seat MD Max   40 mg at 03/17/22 2124    dilTIAZem (CARDIZEM) tablet 30 mg  30 mg Oral 3 times per day Dilcia Rogers MD   30 mg at 03/18/22 1418    pantoprazole (PROTONIX) 40 mg in sodium chloride (PF) 10 mL injection  40 mg IntraVENous Daily Nahomi Melara MD   40 mg at 03/18/22 0926    sodium chloride flush 0.9 % injection 10 mL  10 mL IntraVENous PRN Nahomi Melara MD        fondaparinux (ARIXTRA) injection 10 mg  10 mg SubCUTAneous Daily Faye Daugherty MD   10 mg at 03/18/22 1413    HYDROcodone-acetaminophen (NORCO) 5-325 MG per tablet 1 tablet  1 tablet Oral Q6H PRN BERNARDA Rodarte CNP   1 tablet at 03/18/22 1732    insulin lispro (HUMALOG) injection vial 0-18 Units  0-18 Units SubCUTAneous TID WC Nurys Peace MD   3 Units at 03/18/22 1734    insulin lispro (HUMALOG) injection vial 0-9 Units  0-9 Units SubCUTAneous Nightly Nurys Peace MD   2 Units at 03/15/22 2044    perflutren lipid microspheres (DEFINITY) injection 1.65 mg  1.5 mL IntraVENous ONCE PRN Lottie Turner MD        miconazole (MICOTIN) 2 % powder   Topical BID Eze Reyna MD   Given at 03/18/22 0945    metoprolol (LOPRESSOR) injection 5 mg  5 mg IntraVENous Q6H PRN Eze Reyna MD   5 mg at 03/14/22 1630    lamoTRIgine (LAMICTAL) tablet 200 mg  200 mg Oral Daily Malik Zuleta MD   200 mg at 03/18/22 1413    buPROPion (WELLBUTRIN XL) extended release tablet 300 mg  300 mg Oral QAM Malik Zuleta MD   300 mg at 03/18/22 0927    FLUoxetine (PROZAC) capsule 20 mg  20 mg Oral Daily Malik Zuleta MD   20 mg at 03/18/22 8927    sodium chloride flush 0.9 % injection 10 mL  10 mL IntraVENous PRN Eze Reyna MD   10 mL at 03/14/22 0810    glucose (GLUTOSE) 40 % oral gel 15 g  15 g Oral PRN Ray Shored, DO        dextrose 50 % IV solution  12.5 g IntraVENous PRN Ray Magallanes, DO        glucagon (rDNA) injection 1 mg  1 mg IntraMUSCular PRN Ray Shored, DO        dextrose 5 % solution  100 mL/hr IntraVENous PRN Ray Magallanes, DO        magnesium sulfate 1000 mg in dextrose 5% 100 mL IVPB  1,000 mg IntraVENous PRN Ame Hawkins MD   Stopped at 03/14/22 1559    sodium phosphate 10 mmol in dextrose 5 % 250 mL IVPB  10 mmol IntraVENous PRN Rafiq Bright MD   Stopped at 03/16/22 1415    Or    sodium phosphate 15 mmol in dextrose 5 % 250 mL IVPB  15 mmol IntraVENous PRN Rafiq Bright MD   Stopped at 03/15/22 1709    Or    sodium phosphate 20 mmol in dextrose 5 % 500 mL IVPB  20 mmol IntraVENous PRN Rafiq Bright MD   Stopped at 03/13/22 1330    polyethylene glycol (GLYCOLAX) packet 17 g  17 g Oral Daily PRN Rafiq Bright MD        sodium chloride flush 0.9 % injection 5-40 mL  5-40 mL IntraVENous 2 times per day Rafiq Bright MD   10 mL at 03/17/22 2206    sodium chloride flush 0.9 % injection 5-40 mL  5-40 mL IntraVENous PRN Rafiq Bright MD        0.9 % sodium chloride infusion  25 mL IntraVENous PRN Rafiq Bright  mL/hr at 03/17/22 0252 25 mL at 03/17/22 0252    ondansetron (ZOFRAN-ODT) disintegrating tablet 4 mg  4 mg negative for rash, skin lesions, bruises. Hematologic/Lymphatic: negative for easy bruising, bleeding, lymphadenopathy, or petechiae   Endocrine: negative for heat or cold intolerance,weight changes, change in bowel habits and hair loss   Musculoskeletal: negative for myalgias, arthralgias, pain, joint swelling,and bone pain   Neurological: negative for headaches, dizziness, seizures, weakness, numbness    PHYSICAL EXAM:      /65   Pulse 102   Temp 98.1 °F (36.7 °C) (Oral)   Resp 18   Ht 5' 8\" (1.727 m)   Wt (!) 306 lb 3.5 oz (138.9 kg)   SpO2 97%   BMI 46.56 kg/m²    Temp (24hrs), Av.5 °F (36.9 °C), Min:98.1 °F (36.7 °C), Max:98.8 °F (37.1 °C)    General appearance - well appearing, no in pain or distress   Mental status - alert and cooperative   Eyes - pupils equal and reactive, extraocular eye movements intact   Ears - bilateral TM's and external ear canals normal   Mouth - mucous membranes moist, pharynx normal without lesions   Neck - supple, no significant adenopathy   Lymphatics - no palpable lymphadenopathy, no hepatosplenomegaly   Chest - clear to auscultation, no wheezes, rales or rhonchi, symmetric air entry   Heart - normal rate, regular rhythm, normal S1, S2, no murmurs  Abdomen - soft, nontender, nondistended, no masses or organomegaly   Neurological - alert, oriented, normal speech, no focal findings or movement disorder noted   Musculoskeletal - no joint tenderness, deformity or swelling   Extremities - peripheral pulses normal, no pedal edema, no clubbing or cyanosis   Skin - normal coloration and turgor, no rashes, no suspicious skin lesions noted ,    DATA:    Labs:   CBC:   Recent Labs     22  0450 22  1505 22  2129 22  0528   WBC 4.6  --   --  6.3   HGB 9.7*  --   --  10.4*   HCT 29.7*  --   --  31.2*   PLT 69*   < > 74* 107*    < > = values in this interval not displayed.      BMP:   Recent Labs     22  0450 22  1045    136   K 3.3* 3.5*   CO2 21 22   BUN 6 6   CREATININE 0.58 0.57   LABGLOM >60 >60   GLUCOSE 116* 177*     PT/INR:   No results for input(s): PROTIME, INR in the last 72 hours. IMAGING DATA:  XR CHEST (2 VW)   Final Result   Similar appearance of left lower lobe pneumonia. CT CHEST PULMONARY EMBOLISM W CONTRAST   Final Result   Filling defects within the pulmonary arterial branches within the left lower   lobe and right lower lobe, concerning for mild burden of pulmonary embolism. No cardiac strain      Extensive consolidative changes within the left lower lobe and other   scattered areas of mild focal areas of consolidation within bilateral upper   lobes. Findings are concerning for multifocal pneumonia. For detailed description of visualized abdominal findings please refer to the   abdominal CT of 03/13/2022. Meryle Sandman RECOMMENDATIONS:   Unavailable         XR CHEST (2 VW)   Final Result   Increasing infiltration in the left lung base, in keeping with pneumonia. RECOMMENDATION:         CT ABDOMEN PELVIS W IV CONTRAST Additional Contrast? Radiologist Recommendation   Final Result   1. Multifocal pneumonia, most pronounced in the left lower lobe. 2. Inflammatory changes surrounding the pancreas concerning for acute   pancreatitis. No focal fluid collection. 3. Fatty liver. 4. Anasarca. 5. Presacral edema without a discrete abscess. 6. Inflammatory changes surround the bladder, concerning for underlying   infection. 7. No bowel obstruction. XR CHEST PORTABLE   Final Result   Intervally placed right upper extremity PICC distal tip projects over the   right atrium. No discrete pneumothorax. Left basilar opacities persist and appear mildly worsened, concerning for   developing infection. Aspiration may have a similar appearance. Low lung volumes. CT Head WO Contrast   Final Result   No acute intracranial abnormality.          XR CHEST PORTABLE   Final Result   Low lung volumes. Diffuse interstitial opacities in the perihilar areas with   left perihilar and lower lobe alveolar type opacities consistent with   multifocal airspace disease. VL Lower Extremity Bilateral Venous Duplex    (Results Pending)       Primary Problem  Multifocal pneumonia    Active Hospital Problems    Diagnosis Date Noted    Acute pulmonary embolism ([facility] [I26.99] 03/17/2022    Acute pancreatitis [K85.90] 03/14/2022    Multifocal pneumonia [J18.9] 10/35/3631    Alcoholic hepatitis [H10.62] 03/14/2022    Alcohol intoxication ([facility] [F10.929] 12/16/2015    [facility] [D68.61]     Type 2 diabetes mellitus with diabetic polyneuropathy, with long-term current use of insulin ([facility] [E11.42, Z79.4] 07/04/2015       IMPRESSION:   1. Altered mental status   2. alcohol intoxication  3. Multifocal pneumonia  4. Diabetic ketoacidosis  5. Urinary tract infection  6. Thrombocytopenia, overall given clinical picture, timeline suspicion for HIT is very low. 7. History of antiphospholipid antibody syndrome: Patient has been on chronic anticoagulation with Arixtra and therefore is a hypercoagulable state and would recommend resuming anticoagulation soon with Arixtra  8. Acute pancreatitis  9. History of gastric bypass      RECOMMENDATIONS:  1. I reviewed [facility], imaging studies, discussed possible diagnosis and treatment recommendations  2. Heparin antibody test negative  3. TCP likley due to infection. 4.  Abx as per ID  5. No evidence of DIC or TTP  6. Acute PE noted on recent CT  7. Arixtra restarted   8. Given her history of antiphospholipid antibody syndrome, patient is highly prothrombotic and has been following with hematologist in the past she has been maintained on Arixtra at home  9. Other management as per primary team  10. Monitor counts closely  11. We will follow    Discussed with patient and Nurse. Thank you for asking us to see this patient. Jason Davis, MD  Hematologist/Medical Oncologist    Cell: [phone]    This note is created with the assistance of a speech recognition program.  While intending to generate a document that actually reflects the content of the visit, the document can still have some errors including those of syntax and sound a like substitutions which may escape proof reading. It such instances, actual meaning can be extrapolated by contextual diversion.

## 2022-03-19 NOTE — PROGRESS NOTES
HR still elevated  She has no cardiopulmonary complaints    Vitals:    03/19/22 0716   BP: 99/61   Pulse: 105   Resp: 18   Temp: 98.3 °F (36.8 °C)   SpO2: 95%     NAD    Impression  1. Atrial flutter based on prior assessment, in setting of alcohol abuse.  Currently doing well in sinus tachy  She is on arixtra, hx antiphos ab syndrome  She has structurally normal heart  Switch to her home cardizem cd  No further plans from cardiac standpoint, will s/o

## 2022-03-19 NOTE — PLAN OF CARE
Problem: Falls - Risk of:  Goal: Will remain free from falls  Description: Will remain free from falls  3/19/2022 1111 by Burak Forbes RN  Outcome: Ongoing  3/19/2022 0402 by Leesa Hermosillo RN  Outcome: Ongoing  Note: No falls noted this shift. Patient ambulates with x1 staff assistance without difficulty. Bed kept in low position. Safe environment maintained. Bedside table & call light in reach. Uses call light appropriately when needing assistance. Goal: Absence of physical injury  Description: Absence of physical injury  Outcome: Ongoing     Problem: Skin Integrity:  Goal: Will show no infection signs and symptoms  Description: Will show no infection signs and symptoms  Outcome: Ongoing  Goal: Absence of new skin breakdown  Description: Absence of new skin breakdown  Outcome: Ongoing     Problem: Serum Glucose Level - Abnormal:  Goal: Ability to maintain appropriate glucose levels will improve  Description: Ability to maintain appropriate glucose levels will improve  3/19/2022 1111 by Burak Forbes RN  Outcome: Ongoing  3/19/2022 0402 by Leesa Hermosillo RN  Outcome: Ongoing     Problem: Pain:  Goal: Pain level will decrease  Description: Pain level will decrease  3/19/2022 1111 by Burak Forbes RN  Outcome: Ongoing  3/19/2022 0402 by Leesa Hermosillo RN  Outcome: Ongoing  Note: Pain medically managed this  evening. Also used not pharmological interventions.    Goal: Control of acute pain  Description: Control of acute pain  Outcome: Ongoing  Goal: Control of chronic pain  Description: Control of chronic pain  Outcome: Ongoing     Problem: Musculor/Skeletal Functional Status  Goal: Highest potential functional level  Outcome: Ongoing  Goal: Absence of falls  Outcome: Ongoing

## 2022-03-19 NOTE — PLAN OF CARE
Problem: Falls - Risk of:  Goal: Will remain free from falls  Description: Will remain free from falls  3/19/2022 1111 by Mandeep Trevino RN  Outcome: Met This Shift  3/19/2022 1111 by Mandeep Trevino RN  Outcome: Ongoing  3/19/2022 0402 by Ruthie Cobian RN  Outcome: Ongoing  Note: No falls noted this shift. Patient ambulates with x1 staff assistance without difficulty. Bed kept in low position. Safe environment maintained. Bedside table & call light in reach. Uses call light appropriately when needing assistance. Goal: Absence of physical injury  Description: Absence of physical injury  3/19/2022 1111 by Mandeep Trevino RN  Outcome: Met This Shift  3/19/2022 1111 by Mandeep Trevino RN  Outcome: Ongoing     Problem: Skin Integrity:  Goal: Will show no infection signs and symptoms  Description: Will show no infection signs and symptoms  3/19/2022 1111 by Mandeep Trevino RN  Outcome: Met This Shift  3/19/2022 1111 by Mandeep Trevino RN  Outcome: Ongoing  Goal: Absence of new skin breakdown  Description: Absence of new skin breakdown  3/19/2022 1111 by Mandeep Trevino RN  Outcome: Met This Shift  3/19/2022 1111 by Mandeep Trevino RN  Outcome: Ongoing     Problem: Serum Glucose Level - Abnormal:  Goal: Ability to maintain appropriate glucose levels will improve  Description: Ability to maintain appropriate glucose levels will improve  3/19/2022 1111 by Mandeep Trevino RN  Outcome: Met This Shift  3/19/2022 1111 by Mandeep Trevino RN  Outcome: Ongoing  3/19/2022 0402 by Ruthie Cobian RN  Outcome: Ongoing     Problem: Pain:  Goal: Pain level will decrease  Description: Pain level will decrease  3/19/2022 1111 by Mandeep Trevino RN  Outcome: Met This Shift  3/19/2022 1111 by Mandeep Trevino RN  Outcome: Ongoing  3/19/2022 0402 by Ruthie Cobian RN  Outcome: Ongoing  Note: Pain medically managed this  evening. Also used not pharmological interventions.    Goal: Control of acute pain  Description: Control of acute pain  3/19/2022 1111 by Richa Martinez Malena Allan RN  Outcome: Met This Shift  3/19/2022 1111 by Jay Navarro RN  Outcome: Ongoing  Goal: Control of chronic pain  Description: Control of chronic pain  3/19/2022 1111 by Jay Navarro RN  Outcome: Met This Shift  3/19/2022 1111 by Jay Navarro RN  Outcome: Ongoing     Problem: Musculor/Skeletal Functional Status  Goal: Highest potential functional level  3/19/2022 1111 by Jay Navarro RN  Outcome: Met This Shift  3/19/2022 1111 by Jay Navarro RN  Outcome: Ongoing  Goal: Absence of falls  3/19/2022 1111 by Jay Navarro RN  Outcome: Met This Shift  3/19/2022 1111 by Jay Navarro RN  Outcome: Ongoing

## 2022-03-19 NOTE — CARE COORDINATION
Continuity of Care Form    Patient Name: Luis Avilez   :  1980  MRN:  [id number]    Admit date:  3/12/2022  Discharge date:  3/19/2022    Code Status Order: Full Code   Advance Directives:      Admitting Physician:  Mar Bailey MD  PCP: Nelly Sutton    [facility]   Discharging Unit Phone Number: [phone]    Emergency Contact:   Extended Emergency Contact Information  Primary Emergency Contact: Blessing Dears  Address: [address] [facility], [address] [place] [address] Phone: [phone]  Relation: Parent  Secondary Emergency Contact: [address] Phone: [phone]  Mobile Phone: [phone]  Relation: Other    Past Surgical History:  Past Surgical History:   Procedure Laterality Date    ABDOMINAL HERNIA REPAIR  2014    wound vac    ABDOMINAL HERNIA REPAIR  11/3/14    BARIATRIC SURGERY      [facility]    CHOLECYSTECTOMY      DILATION AND CURETTAGE OF UTERUS      ENDOSCOPY, COLON, DIAGNOSTIC      EGD    FINGER AMPUTATION Left 2015    index and ring finger.  from [address] [place] [address] [address]      total of 8    IVC FILTER INSERTION      LIVER RESECTION      for hepatic adenoma    LYMPH NODE BIOPSY      JUSTINE AND BSO      TONSILLECTOMY         Immunization History:   Immunization History   Administered Date(s) Administered    COVID-19, Pfizer Purple top, DILUTE for use, 12+ yrs, 30mcg/0.3mL dose 2021, 2021, 2022    Hepatitis B Adult (Engerix-B) 01/10/2020    Hepatitis B Adult (Recombivax HB) 01/10/2020    Influenza Vaccine, unspecified formulation 10/04/2017, 2018    Influenza Virus Vaccine 10/01/2014, 10/07/2015, 2016, 02/15/2017, 2019    Influenza, Quadv, IM, PF (6 mo and older Fluzone, Flulaval, Fluarix, and 3 yrs and older Afluria) 2019, 12/15/2020    Pneumococcal Conjugate 13-valent Alexandra Marinelli) 10/07/2015    Pneumococcal Polysaccharide ([id number]) 10/15/2015, 12/20/2019    Td, unspecified formulation 01/07/2015    Tdap (Boostrix, Adacel) 07/15/2016       Active Problems:  Patient Active Problem List   Diagnosis Code    Pernicious anemia D51.0    History of ulcer disease Z87.898    History of DVT of lower extremity Z86.718    Primary hypercoagulable state ([facility] D68.59    Amputation finger S68.119A    GERD (gastroesophageal reflux disease) K21.9    Alcohol dependence in remission ([facility] F10.21    Type 2 diabetes mellitus with diabetic polyneuropathy, with long-term current use of insulin ([facility]) E11.42, Z79.4    Primary insomnia F51.01    Essential hypertension I10    Bipolar affective disorder in remission ([facility] F31.70    History of pulmonary embolism Z86.711    Antiphospholipid syndrome ([facility]) D68.61    Alcohol intoxication ([facility] F10.929    Hypertriglyceridemia E78.1    Chronic post-traumatic stress disorder (PTSD) F43.12    OCD (obsessive compulsive disorder) F42.9    Severe benzodiazepine use disorder ([facility]) F13.20    Morbid obesity with BMI of 50.0-59.9, adult ([facility]) E66.01, Z68.43    History of MRSA infection Z86.14    Pain of upper abdomen R10.10    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    NAFLD (nonalcoholic fatty liver disease) K76.0    Nondependent opioid abuse in remission ([facility] F11.11    Osteopenia M85.80    History of Awa-en-Y gastric bypass Z98.84    Herpes genitalis A60.00    History of drug abuse ([facility]) F19.11    Malabsorption K90.9    History of pulmonary embolus (PE) Z86.711    Vitamin B 12 deficiency E53.8    Vitamin D deficiency E55.9    History of surgery of liver Z98.890    Blood alkaline phosphatase increased compared with prior measurement R74.8    Paroxysmal SVT (supraventricular tachycardia) ([facility]) I47.1    Anxiety F41.9    Hypomagnesemia E83.42    Chronic idiopathic constipation K59.04    Recurrent incisional hernia K43.2    History of small bowel obstruction Z87.19    History of peptic ulcer Z87.11    Fibromyalgia M79.7    Polyneuropathy G62.9    COVID-19 virus infection U07.1    DKA, type 2, not at goal [facility]) E11.10    GAIL (acute kidney injury) ([facility] N17.9    Acute pancreatitis K85.90    Multifocal pneumonia C37.2    Alcoholic hepatitis B24.68       Isolation/Infection:   Isolation            Contact          Patient Infection Status       Infection Onset Added Last Indicated Last Indicated By Review Planned Expiration Resolved Resolved By    MRSA  10/27/14 02/22/19 Urine culture clean catch        8/2017 groin abscess    Resolved    COVID-19 (Rule Out) 03/14/22 03/14/22 03/14/22 Respiratory Panel, Molecular, with COVID-19 (Restricted: peds pts or suitable admitted adults) (Ordered)   03/14/22 Julienne Yañez RN    3/5/2022 COVID negative    C-diff Rule Out 03/13/22 03/13/22 03/13/22 Gastrointestinal Panel, Molecular (Ordered)   03/13/22 Rule-Out Test Resulted            Nurse Assessment:  Last Vital Signs: BP (!) 129/57   Pulse 105   Temp 99.1 °F (37.3 °C) (Axillary)   Resp 23   Ht 5' 8\" (1.727 m)   Wt 280 lb (127 kg)   SpO2 90%   BMI 42.57 kg/m²     Last documented pain score (0-10 scale): Pain Level: 0  Last Weight:   Wt Readings from Last 1 Encounters:   03/12/22 280 lb (127 kg)     Mental Status:  oriented and alert    IV Access:  - None    Nursing Mobility/ADLs:  Walking   Assisted  Transfer  Assisted  Bathing  Assisted  Dressing  Assisted  Toileting  Assisted  Feeding  Independent  Med Admin  Independent  Med Delivery   whole    Wound Care Documentation and Therapy:  Wound 11/17/18 Abdomen Mid (Active)   Number of days: 4479       Wound 03/14/22 Buttocks Left;Right (Active)   Wound Image   03/14/22 1746   Wound Etiology Deep tissue/Injury 03/14/22 1746   Dressing Status New dressing applied; Old drainage noted;New drainage noted 03/14/22 1746   Wound Cleansed Soap and water 03/14/22 1746   Dressing/Treatment Pharmaceutical agent (see MAR) 03/14/22 1746   Wound Assessment Purple/maroon;Ruptured blister 03/14/22 1746   Drainage Amount Small 03/14/22 1746   Drainage Description Serosanguinous 03/14/22 1746   Odor None 03/14/22 1746   Valerie-wound Assessment Blanchable erythema;Dry/flaky 03/14/22 1746   Margins Attached edges 03/14/22 1746   Number of days: 0      Buttocks: cleanse gently with foam cleanser, pat dry. Apply Triad cream twice daily and as needed      Elimination:  Continence:   · Bowel: Yes  · Bladder: Yes  Urinary Catheter: None   Colostomy/Ileostomy/Ileal Conduit: No       Date of Last BM:     Intake/Output Summary (Last 24 hours) at 3/14/2022 1812  Last data filed at 3/14/2022 1730  Gross per 24 hour   Intake 9485.8 ml   Output 8000 ml   Net 1485.8 ml     I/O last 3 completed shifts: In: 5152 [I.V.:2995.5; IV Piggyback:3144.6]  Out: 9330 [Urine:5875]    Safety Concerns: At Risk for Falls    Impairments/Disabilities:      None    Nutrition Therapy:  Current Nutrition Therapy:   - Oral Diet:  General    Routes of Feeding: Oral  Liquids: No Restrictions  Daily Fluid Restriction: no  Last Modified Barium Swallow with Video (Video Swallowing Test): not done    Treatments at the Time of Hospital Discharge:   Respiratory Treatments: none  Oxygen Therapy:  is not on home oxygen therapy. Ventilator:    - No ventilator support    Rehab Therapies: Physical Therapy and Occupational Therapy  Weight Bearing Status/Restrictions: No weight bearing restrictions  Other Medical Equipment (for information only, NOT a DME order):  walker  Other Treatments: Skilled nursing assessment and monitoring. Medication education and monitoring per protocol.     Patient's personal belongings (please select all that are sent with patient):  None    RN SIGNATURE:  Electronically signed by Lyndon Britt RN on 3/19/22 at 11:48 AM EDT    CASE MANAGEMENT/SOCIAL WORK SECTION    Inpatient Status Date: 3/12/2022    Readmission Risk Assessment Score:  Readmission Risk              Risk of Unplanned Readmission:  42           Discharging to Facility/ . [address]  [address] [address]  Phone [phone]  Fax  [phone]   Dialysis Facility (if applicable)   · Name:  · Address:  · Dialysis Schedule:  · Phone:  · Fax:    / signature: Electronically signed by Shirley Merrill RN on 3/19/22 at 11:48 AM EDT    PHYSICIAN SECTION    Prognosis: Fair    Condition at Discharge: Stable    Rehab Potential (if transferring to Rehab): Fair    Recommended Labs or Other Treatments After Discharge:     Physician Certification: I certify the above information and transfer of Charley Ramirez  is necessary for the continuing treatment of the diagnosis listed and that she requires Home Care for greater 30 days. Update Admission H&P: No change in H&P    PHYSICIAN SIGNATURE:  Electronically signed by Zoey Mendiola MD on 3/19/22 at 12:43 PM EDT    START taking these medications    START taking these medications   busPIRone 5 MG tablet  Commonly known as: BUSPAR  Take 2 tablets by mouth 3 times daily   levoFLOXacin 750 MG tablet  Commonly known as: Levaquin  Take 1 tablet by mouth daily for 1 day     CHANGE how you take these medications    CHANGE how you take these medications   FLUoxetine 20 MG capsule  Commonly known as: PROZAC  Take 2 capsules by mouth daily  What changed: how much to take     CONTINUE taking these medications    CONTINUE taking these medications   acetaminophen 500 MG tablet  Commonly known as: Tylenol  Take 2 tablets by mouth every 8 hours as needed for Pain   acyclovir 5 % ointment  Commonly known as: ZOVIRAX  Apply topically every 3 hours x 10 days. ADDERALL (30MG) 30 MG tablet  Generic drug: amphetamine-dextroamphetamine  Take 30 mg by mouth daily.    benazepril 20 MG tablet  Commonly known as: LOTENSIN  TAKE 1 TABLET BY MOUTH DAILY   blood glucose test strips  Testing twice a day. Please dispense according to patients insurance. bumetanide 0.5 MG tablet  Commonly known as: BUMEX  TAKE 1 TABLET BY MOUTH DAILY AS NEEDED FOR LEG SWELLING   cyanocobalamin 1000 MCG/ML injection  Inject 1 mL into the muscle every 7 days   dicyclomine 10 MG capsule  Commonly known as: Bentyl  Take 1 capsule by mouth every 6 hours as needed (cramps)   dilTIAZem  MG extended release capsule  Generic drug: dilTIAZem  Take 240 mg by mouth daily Take one capsule by mouth daily. famotidine 20 MG tablet  Commonly known as: PEPCID  TAKE 1 TABLET BY MOUTH TWICE DAILY   Farxiga 10 MG tablet  Generic drug: dapagliflozin  TAKE 1 TABLET BY MOUTH EVERY MORNING   fondaparinux 10 MG/0.8ML Soln injection  Commonly known as: ARIXTRA  Inject 0.8 mLs into the skin daily   GlucaGen HypoKit 1 MG Solr injection  Generic drug: glucagon (rDNA)   glucose monitoring kit  Testing twice a day. Please dispense according to patients insurance. Insulin Pen Needle 31G X 5 MM Misc  1 each by Does not apply route daily   Klor-Con M10 10 MEQ extended release tablet  Generic drug: potassium chloride  Take 2 tablets by mouth daily as needed (take with bumex)   Lancets 30G Misc  Testing twice a day. Please dispense according to patients insurance.    ondansetron 4 MG disintegrating tablet  Commonly known as: Zofran ODT  Take 1 tablet by mouth every 8 hours as needed for Nausea   ondansetron 4 MG tablet  Commonly known as: ZOFRAN  Take 1 tablet by mouth 3 times daily as needed for Nausea or Vomiting   pravastatin 40 MG tablet  Commonly known as: PRAVACHOL  TAKE 1 TABLET(40 MG) BY MOUTH DAILY   sucralfate 1 GM tablet  Commonly known as: CARAFATE  DISSOLVE 1 TABLET INTO 15 ML( 1 TABLESPOON) OF WATER AND DRINK BY MOUTH FOUR TIMES DAILY AS NEEDED FOR GERD   SYRINGE 3CC/25GX1\" 25G X 1\" 3 ML Misc  To be used with B12 injections monthly   therapeutic multivitamin-minerals tablet  Take 1 tablet by mouth daily   tiZANidine 4 MG tablet  Commonly known as: ZANAFLEX  TAKE 1 TABLET BY MOUTH TWICE DAILY AS NEEDED FOR BACK PAIN   Johnson SoloStar 300 UNIT/ML Sopn  Generic drug: Insulin Glargine (1 Unit Dial)  INJECT 70 UNITS INTO THE SKIN EVERY MORNING   valACYclovir 500 MG tablet  Commonly known as: VALTREX  Take 1 tablet by mouth daily   vitamin D 1000 UNIT Tabs tablet  Commonly known as: CHOLECALCIFEROL  Take 10,000 Units by mouth daily 5 days a week     STOP taking these medications    STOP taking these medications   buPROPion 300 MG extended release tablet  Commonly known as: WELLBUTRIN XL   lamoTRIgine 200 MG tablet  Commonly known as: LAMICTAL   LORazepam 2 MG tablet  Commonly known as: ATIVAN   pregabalin 50 MG capsule  Commonly known as: LYRICA   QUEtiapine 100 MG tablet  Commonly known as: SEROQUEL

## 2022-03-19 NOTE — PROGRESS NOTES
[facility] -   Infectious diseases evaluation  admission date 3/12/2022    reason for consultation:   Fever    Impression :   Current:  · Multifocal community-acquired pneumonia  · UTI growing Klebsiella pneumoniae and Enterococcus faecalis  · Sepsis secondary to above resolved  · DKA  · Acute alcohol intoxication /hepatitis  · Paroxysmal atrial fibrillation  · Pancreatitis  · Fatty liver  · Thrombocytopenia    Recommendations      · oral Levaquin 750 mg daily through 3/20/22  · Nasal swab for MRSA was negative  · Vancomycin was started 3/13/2022 discontinued 3/15/2022  · IV cefepime and Flagyl discontinued 3/15/2022  · No growth on blood cultures today  · The patient may be discharged from infectious disease point of view  · Remove right arm PICC line prior to discharge. History of Present Illness:   Initial history:  Juliette Mcqueen is a 42y.o.-year-old female was brought to the hospital per her family for generalized weakness, reportedly the patient was drinking alcohol all night and her  was having difficulty getting her to the bathroom in the morning so he called EMS. At the ER she was tachycardic and hypoxic was placed on oxygen per nasal cannula. Lactic acid was elevated, liver enzymes elevated, ethanol level was 404  Chest x-ray showed diffuse interstitial opacities    Urine culture on 3/13/2021 grew KLEBSIELLA PNEUMONIAE and ENTEROCOCCUS FAECALIS    3/13/2022 stool for C. difficile and GI panel negative  Respiratory panel with COVID-19 was negative        Interval changes  3/19/2022     She is feeling better, less cough and shortness of breath. Denies any fever, chills, headaches, chest pain, S OB, palpitations, abdominal pain, nausea, or vomiting.   WBC 7.1, procalcitonin 0.34    Patient Vitals for the past 8 hrs:   BP Temp Temp src Pulse Resp SpO2   03/19/22 0716 99/61 98.3 °F (36.8 °C) Oral 105 18 95 %           I have personally reviewed the past medical history, past surgical history, medications, social history, and family history, and I haveupdated the database accordingly. Allergies:   Asa [aspirin], Betadine [povidone iodine], Celexa [citalopram hydrobromide], Citalopram, Lasix [furosemide], Macrobid [nitrofurantoin], Norco [hydrocodone-acetaminophen], Betadine [povidone iodine], Codeine, Lithium, Paroxetine, Paxil [paroxetine hcl], Tape [adhesive tape], and Tegretol [carbamazepine]     Review of Systems:     Review of Systems  As per history of present illness, other than above 12 system review was negative  Physical Examination :   Physical Exam  Constitutional:       General: She is not in acute distress. Appearance: Normal appearance. HENT:      Head: Normocephalic and atraumatic. Right Ear: External ear normal.      Left Ear: External ear normal.      Nose: Nose normal.   Eyes:      Extraocular Movements: Extraocular movements intact. Cardiovascular:      Rate and Rhythm: Regular     Pulses: Normal pulses. Heart sounds: Normal heart sounds. No murmur heard.       Pulmonary:      Effort: Pulmonary effort is normal. No respiratory distress. Breath sounds: Normal breath sounds. No wheezing. Abdominal:      General: Bowel sounds are normal. There is no distension. Palpations: Abdomen is soft. Tenderness: There is no abdominal tenderness. There is no guarding or rebound. Musculoskeletal:      Right lower leg: No edema. Left lower leg: No edema. Skin:     General: Skin is warm. Coloration: Skin is not jaundiced. Neurological:      General: No focal deficit present. Mental Status: She is alert and oriented to person, place, and time. Mental status is at baseline. Psychiatric:         Mood and Affect: Mood normal.         Behavior: Behavior normal.         Thought Content:  Thought content normal.          Past Medical History:     Past Medical History:   Diagnosis Date    Alcohol abuse     sober [id number]    Anxiety     Arthritis     Painting esophagus     Benzodiazepine overdose 9/26/2015    Bipolar disorder, unspecified ([address]     Bipolar I disorder, most recent episode depressed, severe without psychotic features ([address]     Cellulitis 11/17/2018    Chronic abdominal wound infection 9/27/2015    Constipation 9/3/2019    Depression 7/12/2015    Drug overdose, intentional ([address] 7/12/2015    Genital herpes, unspecified     GERD (gastroesophageal reflux disease)     History of pulmonary embolism     Hx of blood clots dx 2 years ago    clots in both legs and lungs     Hypertension     Iron deficiency anemia     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    Lumbosacral spondylosis without myelopathy     MDRO (multiple drug resistant organisms) resistance 2010    MRSA (abd)    Miscarriage     multiple, around 7th mos pregnant each time d/t hypercoagulation    Morbid obesity ([address]     MRSA (methicillin resistant staph aureus) culture positive 02/10/2017    urine    Overdose of benzodiazepine     Pernicious anemia     Primary hypercoagulable state ([address]     antiphospholipid antibodies on Arixtra shots daily    Pulmonary embolism ([address] 2010    Suicidal behavior 12/14/2015    Suicidal ideation     Suicide attempt ([address] 2014    hx OD on painkillers and rubbing alcohol    SVT (supraventricular tachycardia) ([address] 04/07/2017    Toxic effect of ethanol, intentional self-harm ([address] 12/16/2015    Type 2 diabetes mellitus, with long-term current use of insulin ([address]        Past Surgical  History:     Past Surgical History:   Procedure Laterality Date    ABDOMINAL HERNIA REPAIR  2014    wound vac    ABDOMINAL HERNIA REPAIR  11/3/14    BARIATRIC SURGERY  2013    [facility]    CHOLECYSTECTOMY      DILATION AND CURETTAGE OF UTERUS  2005    ENDOSCOPY, COLON, DIAGNOSTIC      EGD    FINGER AMPUTATION Left 02/09/2015    index and ring finger.  from [address]PAIR      total of 8    IVC FILTER INSERTION      LIVER RESECTION      for hepatic adenoma    LYMPH NODE BIOPSY  1990    JUSTINE AND BSO  2011    TONSILLECTOMY         Medications:      dilTIAZem  180 mg Oral Daily    levoFLOXacin  750 mg Oral Daily    guaiFENesin  600 mg Oral BID    chlordiazePOXIDE  10 mg Oral TID    insulin glargine  50 Units SubCUTAneous Daily    pravastatin  40 mg Oral Nightly    pantoprazole (PROTONIX) 40 mg injection  40 mg IntraVENous Daily    fondaparinux  10 mg SubCUTAneous Daily    insulin lispro  0-18 Units SubCUTAneous TID WC    insulin lispro  0-9 Units SubCUTAneous Nightly    miconazole   Topical BID    lamoTRIgine  200 mg Oral Daily    buPROPion  300 mg Oral QAM    FLUoxetine  20 mg Oral Daily    sodium chloride flush  5-40 mL IntraVENous 2 times per day    sodium chloride flush  5-40 mL IntraVENous 2 times per day       Social History:     Social History     Socioeconomic History    Marital status:      Spouse name: Not on file    Number of children: 1    Years of education: 3 years of college    Highest education level: Not on file   Occupational History    Occupation: infant care     Comment: last worked 2008   Tobacco Use    Smoking status: Never Smoker    Smokeless tobacco: Never Used   Substance and Sexual Activity    Alcohol use: No     Alcohol/week: 0.0 standard drinks     Comment: Last alcohol use was in 12/2015    Drug use: No     Comment: Pt stole her mom's Benzo 1 yr ago. Pt said she took more than prescribed dose of Valium once in late 90's.     Sexual activity: Yes     Partners: Male   Other Topics Concern    Not on file   Social History Narrative    Lives with Sabina Brunner ex  and daughter          Social Determinants of Health     Financial Resource Strain:     Difficulty of Paying Living Expenses: Not on file   Food Insecurity:     Worried About Running Out of Food in the Last Year: Not on file    [address] in the Last Year: Not on file   Transportation Needs:     Lack of Transportation (Medical): Not on file    Lack of Transportation (Non-Medical):  Not on file   Physical Activity:     Days of Exercise per Week: Not on file    Minutes of Exercise per Session: Not on file   Stress:     Feeling of Stress : Not on file   Social Connections:     Frequency of Communication with Friends and Family: Not on file    Frequency of Social Gatherings with Friends and Family: Not on file    Attends Spiritism Services: Not on file    Active Member of Clubs or Organizations: Not on file    Attends Club or Organization Meetings: Not on file    Marital Status: Not on file   Intimate Partner Violence:     Fear of Current or Ex-Partner: Not on file    Emotionally Abused: Not on file    Physically Abused: Not on file    Sexually Abused: Not on file   Housing Stability:     Unable to Pay for Housing in the Last Year: Not on file    Number of [place] in the Last Year: Not on file    Unstable Housing in the Last Year: Not on file       Family History:     Family History   Problem Relation Age of Onset    Depression Mother     High Blood Pressure Mother     High Cholesterol Mother     Diabetes Father     Heart Disease Father     Kidney Disease Father     High Blood Pressure Father     High Cholesterol Father     Cancer Maternal Uncle         of duodenum had whipple    Breast Cancer Maternal Aunt     Breast Cancer Maternal Cousin       Medical Decision Making:   I have independently reviewed/ordered the following labs:    CBC with Differential:   Recent Labs     03/18/22  0528 03/19/22  0713   WBC 6.3 7.1   HGB 10.4* 10.6*   HCT 31.2* 32.2*   * 185   LYMPHOPCT 9* 10*   MONOPCT 24* 14*     BMP:  Recent Labs     03/18/22  1045 03/19/22  0713    136   K 3.5* 3.1*    104   CO2 22 19*   BUN 6 6   CREATININE 0.57 0.58   MG 2.1 2.2     Hepatic Function Panel:   No results for input(s): PROT, LABALBU, BILIDIR, IBILI, BILITOT, ALKPHOS, ALT, AST in the last 72 hours. No results for input(s): RPR in the last 72 hours. No results for input(s): HIV in the last 72 hours. No results for input(s): BC in the last 72 hours. Lab Results   Component Value Date    CREATININE 0.58 03/19/2022    GLUCOSE 160 03/19/2022    GLUCOSE 403 06/03/2012       Detailed results: Thank you for allowing us to participate in the care of this patient. Please call with questions. This note is created with the assistance of a speech recognition program.  While intending to generate adocument that actually reflects the content of the visit, the document can still have some errors including those of syntax and sound a like substitutions which may escape proof reading. It such instances, actual meaningcan be extrapolated by contextual diversion.       Rosalie Herbert MD    Office: [phone]  Perfect serve / office [phone]

## 2022-03-19 NOTE — PLAN OF CARE
Problem: Falls - Risk of:  Goal: Will remain free from falls  Description: Will remain free from falls  3/19/2022 0402 by Neelam Brown RN  Outcome: Ongoing  Note: No falls noted this shift. Patient ambulates with x1 staff assistance without difficulty. Bed kept in low position. Safe environment maintained. Bedside table & call light in reach. Uses call light appropriately when needing assistance. Problem: Serum Glucose Level - Abnormal:  Goal: Ability to maintain appropriate glucose levels will improve  Description: Ability to maintain appropriate glucose levels will improve  3/19/2022 0402 by Neelam Brown RN  Outcome: Ongoing     Problem: Pain:  Goal: Pain level will decrease  Description: Pain level will decrease  3/19/2022 0402 by Neelam Brown RN  Outcome: Ongoing  Note: Pain medically managed this  evening. Also used not pharmological interventions.

## 2022-03-21 ENCOUNTER — CARE COORDINATION (OUTPATIENT)
Dept: CASE MANAGEMENT | Age: 42
End: 2022-03-21

## 2022-03-21 NOTE — CARE COORDINATION
[address] Transitions Initial Follow Up Call    Call within 2 business days of discharge: Yes    Patient: Sherine Wilcox Patient : 1980   MRN: <[id number]>  Reason for Admission: PNEUMONIA  DKA  Discharge Date: 3/19/22 RARS: Readmission Risk Score: 15.1 ( )      Last Discharge [facility]       Complaint Diagnosis Description Type Department Provider    3/12/22 Alcohol Intoxication Diabetic ketoacidosis without coma associated with type 2 diabetes mellitus ([facility] [address] . .. ED to Hosp-Admission (Discharged) (ADMITTED) Nikolay Almeida MD; Landry Vincent. .. Spoke with:  Attempted to contact patient for 24 hour initial transitional call. Unable to reach patient. Caller left a Hipaa compliant message introducing self, role, nature of the call and contact information for a return call. Facility: [address] [place] [address]    Non-face-to-face services provided:  Obtained and reviewed discharge summary and/or continuity of care documents  Communication with home health agencies or other community services the patient is currently using-[facility] [place] [address] Transitions 24 Hour Call    Care Transitions Interventions         Follow Up  No future appointments.     BONY Raman LPN Care Transitions Nurse   [phone]

## 2022-03-22 ENCOUNTER — CARE COORDINATION (OUTPATIENT)
Dept: CASE MANAGEMENT | Age: 42
End: 2022-03-22

## 2022-03-23 ENCOUNTER — TELEPHONE (OUTPATIENT)
Dept: ONCOLOGY | Age: 42
End: 2022-03-23

## 2022-03-23 NOTE — TELEPHONE ENCOUNTER
Called pt and mailbox full    Pt needs a hospital follow up with dr Priyanka Gaitan.  Give pt option for [facility] or [facility]

## 2022-04-21 ENCOUNTER — HOSPITAL ENCOUNTER (OUTPATIENT)
Age: 42
Setting detail: SPECIMEN
Discharge: HOME | End: 2022-04-21
Payer: MEDICARE

## 2022-04-21 LAB
ABSOLUTE EOS #: 0.2 K/UL (ref 0–0.4)
ABSOLUTE LYMPH #: 1.1 K/UL (ref 1–4.8)
ABSOLUTE MONO #: 0.3 K/UL (ref 0.1–1.3)
BASOPHILS # BLD: 1 % (ref 0–2)
BASOPHILS ABSOLUTE: 0 K/UL (ref 0–0.2)
EOSINOPHILS RELATIVE PERCENT: 3 % (ref 0–4)
HCT VFR BLD CALC: 32.7 % (ref 36–46)
HEMOGLOBIN: 10.7 G/DL (ref 12–16)
LYMPHOCYTES # BLD: 20 % (ref 24–44)
MCH RBC QN AUTO: 30.3 PG (ref 26–34)
MCHC RBC AUTO-ENTMCNC: 32.8 G/DL (ref 31–37)
MCV RBC AUTO: 92.6 FL (ref 80–100)
MONOCYTES # BLD: 5 % (ref 1–7)
PDW BLD-RTO: 16.4 % (ref 11.5–14.9)
PLATELET # BLD: 540 K/UL (ref 150–450)
PMV BLD AUTO: 9 FL (ref 6–12)
RBC # BLD: 3.54 M/UL (ref 4–5.2)
REASON FOR REJECTION: NORMAL
SEG NEUTROPHILS: 71 % (ref 36–66)
SEGMENTED NEUTROPHILS ABSOLUTE COUNT: 4 K/UL (ref 1.3–9.1)
WBC # BLD: 5.6 K/UL (ref 3.5–11)
ZZ NTE CLEAN UP: ORDERED TEST: NORMAL
ZZ NTE WITH NAME CLEAN UP: SPECIMEN SOURCE: NORMAL

## 2022-04-21 PROCEDURE — 85025 COMPLETE CBC W/AUTO DIFF WBC: CPT

## 2022-04-21 PROCEDURE — 36415 COLL VENOUS BLD VENIPUNCTURE: CPT

## 2022-04-29 ENCOUNTER — HOSPITAL ENCOUNTER (OUTPATIENT)
Age: 42
Setting detail: SPECIMEN
Discharge: HOME | End: 2022-04-29
Payer: MEDICARE

## 2022-04-29 LAB
ABSOLUTE EOS #: 0.4 K/UL (ref 0–0.4)
ABSOLUTE LYMPH #: 1.1 K/UL (ref 1–4.8)
ABSOLUTE MONO #: 0.3 K/UL (ref 0.1–1.3)
ALBUMIN SERPL-MCNC: 3.2 G/DL (ref 3.5–5.2)
ALP BLD-CCNC: 134 U/L (ref 35–104)
ALT SERPL-CCNC: 20 U/L (ref 5–33)
AMYLASE: 30 U/L (ref 28–100)
ANION GAP SERPL CALCULATED.3IONS-SCNC: 11 MMOL/L (ref 9–17)
AST SERPL-CCNC: 22 U/L
BASOPHILS # BLD: 1 % (ref 0–2)
BASOPHILS ABSOLUTE: 0.1 K/UL (ref 0–0.2)
BILIRUB SERPL-MCNC: 0.15 MG/DL (ref 0.3–1.2)
BUN BLDV-MCNC: 9 MG/DL (ref 6–20)
CALCIUM SERPL-MCNC: 8.8 MG/DL (ref 8.6–10.4)
CHLORIDE BLD-SCNC: 108 MMOL/L (ref 98–107)
CO2: 20 MMOL/L (ref 20–31)
CREAT SERPL-MCNC: 0.55 MG/DL (ref 0.5–0.9)
EOSINOPHILS RELATIVE PERCENT: 7 % (ref 0–4)
FERRITIN: 254 NG/ML (ref 13–150)
GFR AFRICAN AMERICAN: >60 ML/MIN
GFR NON-AFRICAN AMERICAN: >60 ML/MIN
GFR SERPL CREATININE-BSD FRML MDRD: ABNORMAL ML/MIN/{1.73_M2}
GLUCOSE BLD-MCNC: 91 MG/DL (ref 70–99)
HCT VFR BLD CALC: 35.3 % (ref 36–46)
HEMOGLOBIN: 11.3 G/DL (ref 12–16)
LIPASE: 7 U/L (ref 13–60)
LYMPHOCYTES # BLD: 21 % (ref 24–44)
MAGNESIUM: 1.8 MG/DL (ref 1.6–2.6)
MCH RBC QN AUTO: 30.4 PG (ref 26–34)
MCHC RBC AUTO-ENTMCNC: 32.1 G/DL (ref 31–37)
MCV RBC AUTO: 94.6 FL (ref 80–100)
MONOCYTES # BLD: 7 % (ref 1–7)
PDW BLD-RTO: 16.3 % (ref 11.5–14.9)
PLATELET # BLD: 385 K/UL (ref 150–450)
PMV BLD AUTO: 9.8 FL (ref 6–12)
POTASSIUM SERPL-SCNC: 3.4 MMOL/L (ref 3.7–5.3)
RBC # BLD: 3.73 M/UL (ref 4–5.2)
SEG NEUTROPHILS: 64 % (ref 36–66)
SEGMENTED NEUTROPHILS ABSOLUTE COUNT: 3.5 K/UL (ref 1.3–9.1)
SODIUM BLD-SCNC: 139 MMOL/L (ref 135–144)
TOTAL PROTEIN: 6.5 G/DL (ref 6.4–8.3)
WBC # BLD: 5.4 K/UL (ref 3.5–11)

## 2022-04-29 PROCEDURE — 82607 VITAMIN B-12: CPT

## 2022-04-29 PROCEDURE — 83735 ASSAY OF MAGNESIUM: CPT

## 2022-04-29 PROCEDURE — 82728 ASSAY OF FERRITIN: CPT

## 2022-04-29 PROCEDURE — 80053 COMPREHEN METABOLIC PANEL: CPT

## 2022-04-29 PROCEDURE — 85025 COMPLETE CBC W/AUTO DIFF WBC: CPT

## 2022-04-29 PROCEDURE — 82150 ASSAY OF AMYLASE: CPT

## 2022-04-29 PROCEDURE — 83550 IRON BINDING TEST: CPT

## 2022-04-29 PROCEDURE — 83540 ASSAY OF IRON: CPT

## 2022-04-29 PROCEDURE — 83690 ASSAY OF LIPASE: CPT

## 2022-04-30 LAB
IRON SATURATION: 24 % (ref 20–55)
IRON: 48 UG/DL (ref 37–145)
REASON FOR REJECTION: NORMAL
TOTAL IRON BINDING CAPACITY: 197 UG/DL (ref 250–450)
UNSATURATED IRON BINDING CAPACITY: 149 UG/DL (ref 112–347)
VITAMIN B-12: 500 PG/ML (ref 232–1245)
ZZ NTE CLEAN UP: ORDERED TEST: NORMAL
ZZ NTE WITH NAME CLEAN UP: SPECIMEN SOURCE: NORMAL

## 2022-06-02 ENCOUNTER — HOSPITAL ENCOUNTER (EMERGENCY)
Age: 42
Discharge: HOME | End: 2022-06-03
Attending: STUDENT IN AN ORGANIZED HEALTH CARE EDUCATION/TRAINING PROGRAM
Payer: MEDICARE

## 2022-06-02 VITALS
TEMPERATURE: 98.1 F | SYSTOLIC BLOOD PRESSURE: 151 MMHG | BODY MASS INDEX: 42.44 KG/M2 | RESPIRATION RATE: 14 BRPM | WEIGHT: 280 LBS | DIASTOLIC BLOOD PRESSURE: 127 MMHG | HEART RATE: 98 BPM | HEIGHT: 68 IN

## 2022-06-02 DIAGNOSIS — F10.920 ACUTE ALCOHOLIC INTOXICATION WITHOUT COMPLICATION (HCC): Primary | ICD-10-CM

## 2022-06-02 LAB
ABSOLUTE EOS #: 0.1 K/UL (ref 0–0.4)
ABSOLUTE LYMPH #: 2 K/UL (ref 1–4.8)
ABSOLUTE MONO #: 0.4 K/UL (ref 0.1–1.3)
ALBUMIN SERPL-MCNC: 2.6 G/DL (ref 3.5–5.2)
ALP BLD-CCNC: 323 U/L (ref 35–104)
ALT SERPL-CCNC: 243 U/L (ref 5–33)
ANION GAP SERPL CALCULATED.3IONS-SCNC: 13 MMOL/L (ref 9–17)
AST SERPL-CCNC: 484 U/L
BASOPHILS # BLD: 1 % (ref 0–2)
BASOPHILS ABSOLUTE: 0.1 K/UL (ref 0–0.2)
BILIRUB SERPL-MCNC: 0.28 MG/DL (ref 0.3–1.2)
BUN BLDV-MCNC: 6 MG/DL (ref 6–20)
CALCIUM SERPL-MCNC: 8.1 MG/DL (ref 8.6–10.4)
CHLORIDE BLD-SCNC: 111 MMOL/L (ref 98–107)
CO2: 21 MMOL/L (ref 20–31)
CREAT SERPL-MCNC: 0.53 MG/DL (ref 0.5–0.9)
EOSINOPHILS RELATIVE PERCENT: 1 % (ref 0–4)
ETHANOL PERCENT: 0.27 %
ETHANOL: 265 MG/DL
GFR AFRICAN AMERICAN: >60 ML/MIN
GFR NON-AFRICAN AMERICAN: >60 ML/MIN
GFR SERPL CREATININE-BSD FRML MDRD: ABNORMAL ML/MIN/{1.73_M2}
GLUCOSE BLD-MCNC: 176 MG/DL (ref 70–99)
HCT VFR BLD CALC: 41.8 % (ref 36–46)
HEMOGLOBIN: 13.5 G/DL (ref 12–16)
LYMPHOCYTES # BLD: 30 % (ref 24–44)
MCH RBC QN AUTO: 30.5 PG (ref 26–34)
MCHC RBC AUTO-ENTMCNC: 32.3 G/DL (ref 31–37)
MCV RBC AUTO: 94.3 FL (ref 80–100)
MONOCYTES # BLD: 6 % (ref 1–7)
PDW BLD-RTO: 15.5 % (ref 11.5–14.9)
PLATELET # BLD: 350 K/UL (ref 150–450)
PMV BLD AUTO: 8.6 FL (ref 6–12)
POTASSIUM SERPL-SCNC: 3.7 MMOL/L (ref 3.7–5.3)
RBC # BLD: 4.43 M/UL (ref 4–5.2)
SEG NEUTROPHILS: 62 % (ref 36–66)
SEGMENTED NEUTROPHILS ABSOLUTE COUNT: 4.1 K/UL (ref 1.3–9.1)
SODIUM BLD-SCNC: 145 MMOL/L (ref 135–144)
TOTAL PROTEIN: 5.8 G/DL (ref 6.4–8.3)
WBC # BLD: 6.6 K/UL (ref 3.5–11)

## 2022-06-02 PROCEDURE — 85025 COMPLETE CBC W/AUTO DIFF WBC: CPT

## 2022-06-02 PROCEDURE — 99285 EMERGENCY DEPT VISIT HI MDM: CPT

## 2022-06-02 PROCEDURE — 36415 COLL VENOUS BLD VENIPUNCTURE: CPT

## 2022-06-02 PROCEDURE — G0480 DRUG TEST DEF 1-7 CLASSES: HCPCS

## 2022-06-02 PROCEDURE — 80053 COMPREHEN METABOLIC PANEL: CPT

## 2022-06-02 PROCEDURE — 80307 DRUG TEST PRSMV CHEM ANLYZR: CPT

## 2022-06-02 RX ORDER — INSULIN ASPART 100 [IU]/ML
5-10 INJECTION, SOLUTION INTRAVENOUS; SUBCUTANEOUS
Status: ON HOLD | COMMUNITY
End: 2022-06-12 | Stop reason: HOSPADM

## 2022-06-02 RX ORDER — LAMOTRIGINE 200 MG/1
200 TABLET ORAL 2 TIMES DAILY
COMMUNITY
End: 2022-06-02

## 2022-06-02 RX ORDER — BUPROPION HYDROCHLORIDE 300 MG/1
300 TABLET ORAL EVERY MORNING
COMMUNITY
End: 2022-06-02

## 2022-06-02 RX ORDER — INSULIN GLARGINE 300 U/ML
50 INJECTION, SOLUTION SUBCUTANEOUS DAILY
Status: ON HOLD | COMMUNITY
End: 2022-06-12 | Stop reason: HOSPADM

## 2022-06-02 RX ORDER — LOSARTAN POTASSIUM 50 MG/1
50 TABLET ORAL DAILY
COMMUNITY

## 2022-06-02 RX ORDER — HYDROCODONE BITARTRATE AND ACETAMINOPHEN 5; 325 MG/1; MG/1
1 TABLET ORAL EVERY 6 HOURS PRN
COMMUNITY

## 2022-06-02 RX ORDER — TRAZODONE HYDROCHLORIDE 100 MG/1
100 TABLET ORAL NIGHTLY PRN
COMMUNITY

## 2022-06-02 RX ORDER — DILTIAZEM HYDROCHLORIDE 360 MG/1
360 CAPSULE, EXTENDED RELEASE ORAL DAILY
COMMUNITY

## 2022-06-02 RX ORDER — CYCLOBENZAPRINE HCL 10 MG
10 TABLET ORAL 3 TIMES DAILY PRN
Status: ON HOLD | COMMUNITY
End: 2022-06-12 | Stop reason: HOSPADM

## 2022-06-02 ASSESSMENT — PAIN - FUNCTIONAL ASSESSMENT: PAIN_FUNCTIONAL_ASSESSMENT: NONE - DENIES PAIN

## 2022-06-02 ASSESSMENT — ENCOUNTER SYMPTOMS
ABDOMINAL PAIN: 0
VOMITING: 0
SHORTNESS OF BREATH: 0
RHINORRHEA: 0
COUGH: 0
NAUSEA: 0

## 2022-06-02 NOTE — ED TRIAGE NOTES
Mode of arrival (squad #, walk in, police, etc) :[employer] [place] [employer]      Chief complaint(s): mental health problems pink slip        Arrival Note (brief scenario, treatment PTA, etc). :have been sad here for mental health problems and states last drink of etoh was this am see Pink Slip          C= \"Have you ever felt that you should Cut down on your drinking? \"  No  A= \"Have people Annoyed you by criticizing your drinking? \"  No  G= \"Have you ever felt bad or Guilty about your drinking? \"  No  E= \"Have you ever had a drink as an Eye-opener first thing in the morning to steady your nerves or to help a hangover? \"  No      Deferred []      Reason for deferring: N/A     *If yes to two or more: probable alcohol abuse. *    Patient's personal belongings secured and patient changed into blue gown by [employer] Safeguard informed of 1:1 watch and that they must be able to view patient's face at all time and may not leave at any time, under any circumstances. Safeguard instructed to call on radio or yell for help if patient attempts to leave or harm self/others.

## 2022-06-02 NOTE — ED NOTES
Provisional Diagnosis:     Patient presented to ED due to suicidal ideation. Patient has history of depression. Psychosocial and Contextual Factors:     Patient has history of alcohol abuse and has been hospitalized for alcohol poisoning recently. C-SSRS Summary:    Patient reports SI with a plan to \"drink self to death\"    Patient: X  Family: X (friends)  Agency:     Substance Abuse:  Patient has history of alcohol abuse. Patient admits to drinking today prior to arrival at ED. Present Suicidal Behavior:    Patient reports SI with a plan to \"drink self to death\". Patient allegedly purchased 10 packs of 10 (little) bottles of fireball and per friends they only found 8 left. Verbal: X    Attempt: X    Past Suicidal Behavior:   Patient admitted to [facility] in March for depression with SI    Verbal:     Attempt:    Self-Injurious/Self-Mutilation:  Patient denies    Violence Current or Past:  None documented or evident. Trauma Identified:    Patient reportedly lost her son 21 years ago today. Protective Factors:    Patient has housing. Patient has support in friends. Patient had insurance. Risk Factors:    Patient has substance abuse issues. Patient has poor insight. Patient has poor impulse control. Clinical Summary:    Patient is a 42year old  female who presented to ED via friends after making suicidal statements to them. Patient was in parking lot of ED, intoxicated and refusing to get out of her friends car. [facility] were able to intervene and patient agreeable to come in to ED. Patient was then placed on an application for emergency admission stating \"Krista was brought to the ER by friends after telling them she wanted to kill herself. Ferol Spatz was recently hospitalized for alcohol overdose. Today she drank 92 small bottles of fireball. She lost a baby 21 years ago today and feels responsible.   She made statements that she would rather die and be with her son because no one cares. She could not guarantee her safety to me. [place]y [place] expressed hopelessness and does not think things will get better. She stated she would be okay if she  from how much alcohol she consumed. \"    Patient arrives to ED tearful saying \"no one cares about me\", \"I just want to be with my son\", \"I just want to die, I have nothing to live for\". Patient admits to drinking prior to her arrival but did not identify how much. Patient does have an admission for suicidal ideation in 2022. Patient is unable to be fully assessed secondary to her intoxication. Level of Care Disposition:    Patient to be reassessed upon sobriety.

## 2022-06-02 NOTE — ED PROVIDER NOTES
[facility]  Emergency Department Encounter  Emergency Medicine Physician     Pt Name: Lisa Crenshaw  MRN: [id number]  Armsvirygfurt 1980  Date of evaluation: 22  PCP:  Jo Soriano       Chief Complaint   Patient presents with    Mental Health Problem       HISTORY OF PRESENT ILLNESS  (Location/Symptom, Timing/Onset, Context/Setting, Quality, Duration, Modifying Factors, Severity.)    Lisa Crenshaw is a 42 y.o. female who presents with suicidality. Patient states that her son  approximately 21 years ago today. She states that over the past several days she has been trying to \"drink herself to death\". States that she has been drinking mostly liquor. Endorses some intermittent nausea but no abdominal pain, no vomiting. Denies any injury. Denies any homicidality. Endorses poor sleep.   Endorses intermittent anxiety        PAST MEDICAL / SURGICAL / SOCIAL / FAMILY HISTORY    has a past medical history of Alcohol abuse, Anxiety, Arthritis, Painting esophagus, Benzodiazepine overdose, Bipolar disorder, unspecified ([address] Bipolar I disorder, most recent episode depressed, severe without psychotic features ([address] Cellulitis, Chronic abdominal wound infection, Constipation, Depression, Drug overdose, intentional ([address] Genital herpes, unspecified, GERD (gastroesophageal reflux disease), History of pulmonary embolism, Hx of blood clots, Hypertension, Iron deficiency anemia, Isopropyl alcohol poisoning, Lumbosacral spondylosis without myelopathy, MDRO (multiple drug resistant organisms) resistance, Miscarriage, Morbid obesity ([address] MRSA (methicillin resistant staph aureus) culture positive, Overdose of benzodiazepine, Pernicious anemia, Primary hypercoagulable state ([address] Pulmonary embolism ([address] Suicidal behavior, Suicidal ideation, Suicide attempt ([address] SVT (supraventricular tachycardia) ([address] Toxic effect of ethanol, intentional self-harm ([address] and Type 2 diabetes mellitus, with long-term current use of insulin ([address] has a past surgical history that includes Cholecystectomy; Liver Resection; hernia repair; Tonsillectomy; Endoscopy, colon, diagnostic; Abdominal hernia repair (2014); Abdominal hernia repair (11/3/14); Bariatric Surgery (2013); Dilation and curettage of uterus (2005); Finger amputation (Left, 02/09/2015); lymph node biopsy (1990); yariel and bso (cervix removed) (2011); and IVC filter insertion. Social History     Socioeconomic History    Marital status:      Spouse name: Not on file    Number of children: 1    Years of education: 3 years of college    Highest education level: Not on file   Occupational History    Occupation: infant care     Comment: last worked 2008   Tobacco Use    Smoking status: Never Smoker    Smokeless tobacco: Never Used   Substance and Sexual Activity    Alcohol use: No     Alcohol/week: 0.0 standard drinks     Comment: Last alcohol use was in 12/2015    Drug use: No     Comment: Pt stole her mom's Benzo 1 yr ago. Pt said she took more than prescribed dose of Valium once in late 90's.  Sexual activity: Yes     Partners: Male   Other Topics Concern    Not on file   Social History Narrative    Lives with Tereso Sever ex  and daughter          Social Determinants of Health     Financial Resource Strain:     Difficulty of Paying Living Expenses: Not on file   Food Insecurity:     Worried About Running Out of Food in the Last Year: Not on file    Geronimo of Food in the Last Year: Not on file   Transportation Needs:     Lack of Transportation (Medical): Not on file    Lack of Transportation (Non-Medical):  Not on file   Physical Activity:     Days of Exercise per Week: Not on file    Minutes of Exercise per Session: Not on file   Stress:     Feeling of Stress : Not on file   Social Connections:     Frequency of Communication with Friends and Family: Not on file    Frequency of Social Gatherings with Friends and Family: Not on file    Attends Confucianism Services: Not on file    Active Member of Clubs or Organizations: Not on file    Attends Club or Organization Meetings: Not on file    Marital Status: Not on file   Intimate Partner Violence:     Fear of Current or Ex-Partner: Not on file    Emotionally Abused: Not on file    Physically Abused: Not on file    Sexually Abused: Not on file   Housing Stability:     Unable to Pay for Housing in the Last Year: Not on file    Number of [place] in the Last Year: Not on file    Unstable Housing in the Last Year: Not on file       Family History   Problem Relation Age of Onset    Depression Mother     High Blood Pressure Mother     High Cholesterol Mother     Diabetes Father     Heart Disease Father     Kidney Disease Father     High Blood Pressure Father     High Cholesterol Father     Cancer Maternal Uncle         of duodenum had whipple    Breast Cancer Maternal Aunt     Breast Cancer Maternal Cousin        Allergies:    Asa [aspirin], Betadine [povidone iodine], Celexa [citalopram hydrobromide], Citalopram, Lasix [furosemide], Macrobid [nitrofurantoin], Norco [hydrocodone-acetaminophen], Betadine [povidone iodine], Codeine, Lithium, Paroxetine, Paxil [paroxetine hcl], Tape [adhesive tape], and Tegretol [carbamazepine]    Home Medications:  Prior to Admission medications    Medication Sig Start Date End Date Taking?  Authorizing Provider   dilTIAZem (CARDIZEM CD) 360 MG extended release capsule Take 360 mg by mouth daily   Yes Historical Provider, MD   Insulin Glargine, 2 Unit Dial, (TOUJEO MAX SOLOSTAR) 300 UNIT/ML SOPN Inject 50 Units into the skin daily   Yes Historical Provider, MD   cyclobenzaprine (FLEXERIL) 10 mg tablet Take 10 mg by mouth 3 times daily as needed for Muscle spasms   Yes Historical Provider, MD   insulin aspart (NOVOLOG FLEXPEN) 100 UNIT/ML injection pen Inject 5-10 Units into the skin 3 times daily (before meals) Per carb ratio   Yes Historical Provider, MD   losartan (COZAAR) 50 mg tablet Take 50 mg by mouth daily   Yes Historical Provider, MD   HYDROcodone-acetaminophen (NORCO) 5-325 MG per tablet Take 1 tablet by mouth every 6 hours as needed for Pain. Indications: Last filled 5/24/22 for 30 days   Yes Historical Provider, MD   traZODone (DESYREL) 100 MG tablet Take 100 mg by mouth nightly as needed for Sleep   Yes Historical Provider, MD   FLUoxetine (PROZAC) 20 MG capsule Take 2 capsules by mouth daily 3/19/22   Deepti Daley MD   famotidine (PEPCID) 20 MG tablet TAKE 1 TABLET BY MOUTH TWICE DAILY 4/22/21   Marion Barnes MD   fondaparinux (ARIXTRA) 10 MG/0.8ML SOLN injection Inject 0.8 mLs into the skin daily 3/30/21   Marion Barnes MD   cyanocobalamin 1000 MCG/ML injection Inject 1 mL into the muscle every 7 days 2/13/21   Marion Barnes MD   Syringe/Needle, Disp, (SYRINGE 3CC/25GX1\") 25G X 1\" 3 ML MISC To be used with B12 injections monthly 2/13/21   Marion Barnes MD   FARXIGA 10 MG tablet TAKE 1 TABLET BY MOUTH EVERY MORNING 11/2/20   Marion Barnes MD   pravastatin (PRAVACHOL) 40 MG tablet TAKE 1 TABLET(40 MG) BY MOUTH DAILY 6/26/20   Marion Barnes MD   valACYclovir (VALTREX) 500 MG tablet Take 1 tablet by mouth daily 6/8/20   Marion Barnes MD   acetaminophen (TYLENOL) 500 MG tablet Take 2 tablets by mouth every 8 hours as needed for Pain 2/19/20   Ayana Washburn,    GLUCAGEN HYPOKIT 1 MG SOLR injection  12/19/19   Historical Provider, MD   glucose monitoring kit (FREESTYLE) monitoring kit Testing twice a day. Please dispense according to patients insurance. 12/20/19   Marion Barnes MD   Insulin Pen Needle 31G X 5 MM MISC 1 each by Does not apply route daily 12/20/19   Marion Barnes MD   Lancets 30G MISC Testing twice a day. Please dispense according to patients insurance.  12/20/19   Marion Barnes MD   blood glucose monitor strips Testing twice a day.  Please dispense according to patients insurance. 12/20/19   Danielle Jarvis MD   Multiple Vitamins-Minerals (THERAPEUTIC MULTIVITAMIN-MINERALS) tablet Take 1 tablet by mouth daily    Historical Provider, MD   vitamin D (CHOLECALCIFEROL) 1000 UNIT TABS tablet Take 1,000 Units by mouth daily     Historical Provider, MD       REVIEW OF SYSTEMS    (2-9 systems for level 4, 10 or more for level 5)    Review of Systems   Constitutional: Negative for chills, fatigue and fever. HENT: Negative for congestion and rhinorrhea. Respiratory: Negative for cough and shortness of breath. Cardiovascular: Negative for chest pain. Gastrointestinal: Negative for abdominal pain, nausea and vomiting. Genitourinary: Negative for dysuria and flank pain. Musculoskeletal: Negative for myalgias. Neurological: Negative for headaches. Psychiatric/Behavioral: Positive for sleep disturbance and suicidal ideas. Negative for confusion. The patient is nervous/anxious. All other systems reviewed and are negative. PHYSICAL EXAM   (up to 7 for level 4, 8 or more for level 5)    INITIAL VITALS:   ED Triage Vitals [06/02/22 1821]   BP Temp Temp Source Heart Rate Resp SpO2 Height Weight   (!) 151/127 98.1 °F (36.7 °C) Oral 98 14 -- 5' 8\" (1.727 m) 280 lb (127 kg)       Physical Exam  Vitals and nursing note reviewed. Constitutional:       General: She is not in acute distress. Appearance: She is well-developed. She is obese. Cardiovascular:      Rate and Rhythm: Normal rate and regular rhythm. Pulses:           Radial pulses are 2+ on the right side and 2+ on the left side. Heart sounds: Normal heart sounds. No murmur heard. Pulmonary:      Effort: Pulmonary effort is normal. No respiratory distress. Breath sounds: Normal breath sounds. No decreased breath sounds. Abdominal:      General: There is no distension. Palpations: Abdomen is soft. Tenderness:  There is no abdominal tenderness. Skin:     General: Skin is warm and dry. Capillary Refill: Capillary refill takes less than 2 seconds. Neurological:      Mental Status: She is alert and oriented to person, place, and time. Psychiatric:         Attention and Perception: Attention normal.         Mood and Affect: Mood is anxious and depressed. Thought Content: Thought content includes suicidal ideation. Thought content does not include homicidal ideation. Thought content includes suicidal plan. Thought content does not include homicidal plan. DIFFERENTIAL  DIAGNOSIS   PLAN (LABS / IMAGING / EKG):  Orders Placed This Encounter   Procedures    CBC with Auto Differential    Comprehensive Metabolic Panel    Ethanol    Urine Drug Screen       MEDICATIONS ORDERED:  No orders of the defined types were placed in this encounter. DIAGNOSTIC RESULTS / EMERGENCYDEPARTMENT COURSE / MDM   LABS:  Labs Reviewed   CBC WITH AUTO DIFFERENTIAL - Abnormal; Notable for the following components:       Result Value    RDW 15.5 (*)     All other components within normal limits   COMPREHENSIVE METABOLIC PANEL - Abnormal; Notable for the following components:    Glucose 176 (*)     Calcium 8.1 (*)     Sodium 145 (*)     Chloride 111 (*)     Alkaline Phosphatase 323 (*)      (*)      (*)     Total Bilirubin 0.28 (*)     Total Protein 5.8 (*)     Albumin 2.6 (*)     All other components within normal limits   ETHANOL - Abnormal; Notable for the following components:    Ethanol 265 (*)     All other components within normal limits   URINE DRUG SCREEN       RADIOLOGY:  No results found. Impression:  Patient states that she is a plan to drink herself to death. Apparently she bought several pallets worth of fireball and has consumed at least 2 of them. Patient acting intoxicated, smells of alcohol. Will obtain [facility] labs, plan for admission. We will expect elevated ethanol level.       EMERGENCY DEPARTMENT COURSE:  ED Course as of 06/02/22 2123   Thu Jun 02, 2022 2054 Elevated liver function test likely secondary to have an alcohol use given that AST greater than twice ALT and alcoholic hepatic injury pattern [AP]   2123 Ethanol level 265 mg/dL. Estimated sober reassessment time given to 25 mg/dL/h decrease at approximately 2:30 AM [AP]   2123 Patient signed out to Dr. Theodora Doherty [AP]      ED Course User Index  [AP] Frankey Railing, DO         PROCEDURES:  none    CONSULTS:  None    CRITICAL CARE:  There was a high probability of clinically significant/life threatening deterioration in this patient's condition which required my urgent intervention. Total critical care time was 10 minutes. This excludes any time for separately reportable procedures. FINAL IMPRESSION     1. Suicidal ideation          DISPOSITION / PLAN   DISPOSITION Decision To Admit 06/02/2022 06:53:35 PM      PATIENT REFERRED TO:  No follow-up provider specified.     DISCHARGE MEDICATIONS:  New Prescriptions    No medications on file       Frankey Railing, DO  Emergency Medicine Attending    (Please note that portions of this note were completed with a voice recognition program.  Efforts were made to edit the dictations but occasionally words are mis-transcribed.)         Frankey Railing, DO  06/02/22 2117       Frankey Railing, DO  06/02/22 2123

## 2022-06-03 LAB
AMPHETAMINE SCREEN URINE: NEGATIVE
BARBITURATE SCREEN URINE: NEGATIVE
BENZODIAZEPINE SCREEN, URINE: NEGATIVE
CANNABINOID SCREEN URINE: NEGATIVE
COCAINE METABOLITE, URINE: NEGATIVE
METHADONE SCREEN, URINE: NEGATIVE
OPIATES, URINE: NEGATIVE
OXYCODONE SCREEN URINE: NEGATIVE
PHENCYCLIDINE, URINE: NEGATIVE
TEST INFORMATION: NORMAL

## 2022-06-03 ASSESSMENT — PAIN - FUNCTIONAL ASSESSMENT: PAIN_FUNCTIONAL_ASSESSMENT: NONE - DENIES PAIN

## 2022-06-03 NOTE — PROGRESS NOTES
Medication History completed:    New medications: cyclobenzaprine, Norco, Novolog, losartan, trazodone    Medications discontinued: acyclovir cream, Adderall, benazepril, bumetanide, dicyclomine, potassium chloride ER tablets, ondansetron, sucralfate, tizanidine    Changes to dosing:  B12 injections are given on Mondays  Diltiazem ER changed to 360 mg daily  Toujeo changed to 50 units daily  Cholecalciferol changed to 1000 units daily    Stated allergies: As listed    Other pertinent information: Medications confirmed with ALLI Crowley, and [address] [place] [address].      Thank you,  Phyllis Neff, PharmD, BCPS  [phone]

## 2022-06-03 NOTE — ED PROVIDER NOTES
[address] ED  eMERGENCY dEPARTMENT eNCOUnter      Pt Name: Kat Mittal  MRN: [id number]  YOB: 1980  Date of evaluation: 6/3/22  PCP: Roel Dickey of this patient was assumed from Dr. Zenobia Gould. The patient was seen for Mental Health Problem  . The patient's initial evaluation and plan have been discussed with the prior provider who initially evaluated the patient. Nursing Notes, Past Medical Hx, Past Surgical Hx, Social Hx, Allergies, and Family Hx were all reviewed. Plan: Reevaluate at 2:30 AM once sober      ED Course     The patient was given the following medications:  No orders of the defined types were placed in this encounter. RECENT VITALS:  BP: (!) 151/127, Temp: 98.1 °F (36.7 °C), Heart Rate: 98, Resp: 14     RADIOLOGY:All plain film, CT, MRI, and formal ultrasound images (except ED bedside ultrasound) are read by the radiologist and the images and interpretations are directly viewed by the emergency physician. No orders to display         LABS: All lab results were reviewed by myself, and all abnormals are listed below. Labs Reviewed   CBC WITH AUTO DIFFERENTIAL - Abnormal; Notable for the following components:       Result Value    RDW 15.5 (*)     All other components within normal limits   COMPREHENSIVE METABOLIC PANEL - Abnormal; Notable for the following components:    Glucose 176 (*)     Calcium 8.1 (*)     Sodium 145 (*)     Chloride 111 (*)     Alkaline Phosphatase 323 (*)      (*)      (*)     Total Bilirubin 0.28 (*)     Total Protein 5.8 (*)     Albumin 2.6 (*)     All other components within normal limits   ETHANOL - Abnormal; Notable for the following components:    Ethanol 265 (*)     All other components within normal limits   URINE DRUG SCREEN     2:35 AM EDT  Patient reevaluated. She is clinically sober at this time. She has no thoughts of suicide or homicide.   She states she feels safe going home and wants to go home.  She has family at home. Advised to return if any symptoms worsen. Pink slip invalid because it was written when she was highly intoxicated. She is agreeable to plan will be discharged home today. Disposition     DISPOSITION:    DISPOSITION Decision To Discharge 06/03/2022 02:34:47 AM      CLINICAL IMPRESSION:  1.  Acute alcoholic intoxication without complication [facility])        PATIENT REFERRED TO:  Shira Aj  [address]  [address]  [phone]    Schedule an appointment as soon as possible for a visit       [facility] ED  [address]  [address] [facility]  [phone]    As needed, If symptoms worsen      DISCHARGE MEDICATIONS:  Current Discharge Medication List              Be [place],   06/03/22 0071

## 2022-06-09 ENCOUNTER — HOSPITAL ENCOUNTER (INPATIENT)
Age: 42
LOS: 1 days | Discharge: HOME | DRG: 897 | End: 2022-06-12
Attending: STUDENT IN AN ORGANIZED HEALTH CARE EDUCATION/TRAINING PROGRAM | Admitting: INTERNAL MEDICINE
Payer: MEDICARE

## 2022-06-09 ENCOUNTER — APPOINTMENT (OUTPATIENT)
Dept: GENERAL RADIOLOGY | Age: 42
DRG: 897 | End: 2022-06-09
Payer: MEDICARE

## 2022-06-09 DIAGNOSIS — R00.0 TACHYCARDIA: ICD-10-CM

## 2022-06-09 DIAGNOSIS — F10.920 ACUTE ALCOHOLIC INTOXICATION WITHOUT COMPLICATION (HCC): Primary | ICD-10-CM

## 2022-06-09 LAB
ABSOLUTE EOS #: 0 K/UL (ref 0–0.4)
ABSOLUTE LYMPH #: 1.9 K/UL (ref 1–4.8)
ABSOLUTE MONO #: 0.4 K/UL (ref 0.1–1.3)
ALBUMIN SERPL-MCNC: 3 G/DL (ref 3.5–5.2)
ALP BLD-CCNC: 573 U/L (ref 35–104)
ALT SERPL-CCNC: 369 U/L (ref 5–33)
ANION GAP SERPL CALCULATED.3IONS-SCNC: 22 MMOL/L (ref 9–17)
AST SERPL-CCNC: 335 U/L
BASOPHILS # BLD: 1 % (ref 0–2)
BASOPHILS ABSOLUTE: 0 K/UL (ref 0–0.2)
BETA-HYDROXYBUTYRATE: 0.21 MMOL/L (ref 0.02–0.27)
BILIRUB SERPL-MCNC: 0.69 MG/DL (ref 0.3–1.2)
BUN BLDV-MCNC: 4 MG/DL (ref 6–20)
CALCIUM SERPL-MCNC: 8.3 MG/DL (ref 8.6–10.4)
CHLORIDE BLD-SCNC: 95 MMOL/L (ref 98–107)
CO2: 20 MMOL/L (ref 20–31)
CREAT SERPL-MCNC: 0.72 MG/DL (ref 0.5–0.9)
EOSINOPHILS RELATIVE PERCENT: 0 % (ref 0–4)
ETHANOL PERCENT: 0.36 %
ETHANOL: 356 MG/DL
GFR AFRICAN AMERICAN: >60 ML/MIN
GFR NON-AFRICAN AMERICAN: >60 ML/MIN
GFR SERPL CREATININE-BSD FRML MDRD: ABNORMAL ML/MIN/{1.73_M2}
GLUCOSE BLD-MCNC: 326 MG/DL (ref 65–105)
GLUCOSE BLD-MCNC: 463 MG/DL (ref 65–105)
GLUCOSE BLD-MCNC: 509 MG/DL (ref 70–99)
HCT VFR BLD CALC: 46.4 % (ref 36–46)
HEMOGLOBIN: 15.3 G/DL (ref 12–16)
LIPASE: 29 U/L (ref 13–60)
LYMPHOCYTES # BLD: 25 % (ref 24–44)
MCH RBC QN AUTO: 30.8 PG (ref 26–34)
MCHC RBC AUTO-ENTMCNC: 33 G/DL (ref 31–37)
MCV RBC AUTO: 93.4 FL (ref 80–100)
MONOCYTES # BLD: 5 % (ref 1–7)
PDW BLD-RTO: 14.7 % (ref 11.5–14.9)
PLATELET # BLD: 273 K/UL (ref 150–450)
PMV BLD AUTO: 10.4 FL (ref 6–12)
POTASSIUM SERPL-SCNC: 3.6 MMOL/L (ref 3.7–5.3)
RBC # BLD: 4.96 M/UL (ref 4–5.2)
SEG NEUTROPHILS: 69 % (ref 36–66)
SEGMENTED NEUTROPHILS ABSOLUTE COUNT: 5.3 K/UL (ref 1.3–9.1)
SODIUM BLD-SCNC: 137 MMOL/L (ref 135–144)
TOTAL PROTEIN: 6.7 G/DL (ref 6.4–8.3)
TROPONIN, HIGH SENSITIVITY: 29 NG/L (ref 0–14)
WBC # BLD: 7.7 K/UL (ref 3.5–11)

## 2022-06-09 PROCEDURE — G0480 DRUG TEST DEF 1-7 CLASSES: HCPCS

## 2022-06-09 PROCEDURE — 36415 COLL VENOUS BLD VENIPUNCTURE: CPT

## 2022-06-09 PROCEDURE — 85025 COMPLETE CBC W/AUTO DIFF WBC: CPT

## 2022-06-09 PROCEDURE — 83690 ASSAY OF LIPASE: CPT

## 2022-06-09 PROCEDURE — 82947 ASSAY GLUCOSE BLOOD QUANT: CPT

## 2022-06-09 PROCEDURE — 96361 HYDRATE IV INFUSION ADD-ON: CPT

## 2022-06-09 PROCEDURE — 83036 HEMOGLOBIN GLYCOSYLATED A1C: CPT

## 2022-06-09 PROCEDURE — 71045 X-RAY EXAM CHEST 1 VIEW: CPT

## 2022-06-09 PROCEDURE — 2500000003 HC RX 250 WO HCPCS: Performed by: STUDENT IN AN ORGANIZED HEALTH CARE EDUCATION/TRAINING PROGRAM

## 2022-06-09 PROCEDURE — 93005 ELECTROCARDIOGRAM TRACING: CPT | Performed by: STUDENT IN AN ORGANIZED HEALTH CARE EDUCATION/TRAINING PROGRAM

## 2022-06-09 PROCEDURE — 82010 KETONE BODYS QUAN: CPT

## 2022-06-09 PROCEDURE — 84484 ASSAY OF TROPONIN QUANT: CPT

## 2022-06-09 PROCEDURE — 96372 THER/PROPH/DIAG INJ SC/IM: CPT

## 2022-06-09 PROCEDURE — 2580000003 HC RX 258: Performed by: STUDENT IN AN ORGANIZED HEALTH CARE EDUCATION/TRAINING PROGRAM

## 2022-06-09 PROCEDURE — 6370000000 HC RX 637 (ALT 250 FOR IP): Performed by: STUDENT IN AN ORGANIZED HEALTH CARE EDUCATION/TRAINING PROGRAM

## 2022-06-09 PROCEDURE — 99285 EMERGENCY DEPT VISIT HI MDM: CPT

## 2022-06-09 PROCEDURE — 96360 HYDRATION IV INFUSION INIT: CPT

## 2022-06-09 PROCEDURE — 80053 COMPREHEN METABOLIC PANEL: CPT

## 2022-06-09 RX ORDER — INSULIN LISPRO 100 [IU]/ML
10 INJECTION, SOLUTION INTRAVENOUS; SUBCUTANEOUS ONCE
Status: COMPLETED | OUTPATIENT
Start: 2022-06-09 | End: 2022-06-09

## 2022-06-09 RX ORDER — LORAZEPAM 1 MG/1
1 TABLET ORAL ONCE
Status: COMPLETED | OUTPATIENT
Start: 2022-06-09 | End: 2022-06-09

## 2022-06-09 RX ORDER — 0.9 % SODIUM CHLORIDE 0.9 %
1000 INTRAVENOUS SOLUTION INTRAVENOUS ONCE
Status: COMPLETED | OUTPATIENT
Start: 2022-06-09 | End: 2022-06-09

## 2022-06-09 RX ORDER — LORAZEPAM 1 MG/1
2 TABLET ORAL ONCE
Status: COMPLETED | OUTPATIENT
Start: 2022-06-10 | End: 2022-06-10

## 2022-06-09 RX ORDER — ONDANSETRON 4 MG/1
4 TABLET, ORALLY DISINTEGRATING ORAL ONCE
Status: COMPLETED | OUTPATIENT
Start: 2022-06-09 | End: 2022-06-09

## 2022-06-09 RX ADMIN — LORAZEPAM 1 MG: 1 TABLET ORAL at 20:26

## 2022-06-09 RX ADMIN — SODIUM CHLORIDE 1000 ML: 9 INJECTION, SOLUTION INTRAVENOUS at 21:51

## 2022-06-09 RX ADMIN — ANTI-FUNGAL POWDER MICONAZOLE NITRATE TALC FREE: 1.42 POWDER TOPICAL at 21:53

## 2022-06-09 RX ADMIN — ONDANSETRON 4 MG: 4 TABLET, ORALLY DISINTEGRATING ORAL at 20:27

## 2022-06-09 RX ADMIN — INSULIN LISPRO 10 UNITS: 100 INJECTION, SOLUTION INTRAVENOUS; SUBCUTANEOUS at 22:45

## 2022-06-09 ASSESSMENT — PAIN DESCRIPTION - DESCRIPTORS: DESCRIPTORS: BURNING

## 2022-06-09 ASSESSMENT — PAIN SCALES - GENERAL: PAINLEVEL_OUTOF10: 10

## 2022-06-09 ASSESSMENT — LIFESTYLE VARIABLES
HOW MANY STANDARD DRINKS CONTAINING ALCOHOL DO YOU HAVE ON A TYPICAL DAY: 10 OR MORE
HOW OFTEN DO YOU HAVE A DRINK CONTAINING ALCOHOL: 4 OR MORE TIMES A WEEK

## 2022-06-09 ASSESSMENT — PAIN - FUNCTIONAL ASSESSMENT: PAIN_FUNCTIONAL_ASSESSMENT: 0-10

## 2022-06-09 ASSESSMENT — PAIN DESCRIPTION - LOCATION: LOCATION: LEG

## 2022-06-09 ASSESSMENT — PAIN DESCRIPTION - PAIN TYPE: TYPE: ACUTE PAIN

## 2022-06-09 ASSESSMENT — PAIN DESCRIPTION - FREQUENCY: FREQUENCY: CONTINUOUS

## 2022-06-09 ASSESSMENT — PAIN DESCRIPTION - ORIENTATION: ORIENTATION: RIGHT;LEFT

## 2022-06-09 NOTE — ED TRIAGE NOTES
EMS called by pt's parents due to pt making suicidal threats and drinking approximately 20+ small bottles of fire ball whiskey. Pt's blood sugar per ems read high. Pt denies SI or HI at this time but states that she is withdrawing from alcohol. Pt's speech is slurred but she answers all questions appropriately.

## 2022-06-10 ENCOUNTER — APPOINTMENT (OUTPATIENT)
Dept: INTERVENTIONAL RADIOLOGY/VASCULAR | Age: 42
DRG: 897 | End: 2022-06-10
Payer: MEDICARE

## 2022-06-10 PROBLEM — R74.01 TRANSAMINASEMIA: Status: ACTIVE | Noted: 2022-06-10

## 2022-06-10 PROBLEM — F10.920 ALCOHOLIC INTOXICATION WITHOUT COMPLICATION (HCC): Status: ACTIVE | Noted: 2022-06-10

## 2022-06-10 PROBLEM — N30.01 ACUTE CYSTITIS WITH HEMATURIA: Status: ACTIVE | Noted: 2019-02-24

## 2022-06-10 LAB
ALBUMIN SERPL-MCNC: 2 G/DL (ref 3.5–5.2)
ALP BLD-CCNC: 416 U/L (ref 35–104)
ALT SERPL-CCNC: 277 U/L (ref 5–33)
ANION GAP SERPL CALCULATED.3IONS-SCNC: 17 MMOL/L (ref 9–17)
AST SERPL-CCNC: 295 U/L
BILIRUB SERPL-MCNC: 0.7 MG/DL (ref 0.3–1.2)
BILIRUBIN DIRECT: 0.29 MG/DL
BILIRUBIN URINE: NEGATIVE
BILIRUBIN, INDIRECT: 0.41 MG/DL (ref 0–1)
BUN BLDV-MCNC: 5 MG/DL (ref 6–20)
CALCIUM SERPL-MCNC: 6.9 MG/DL (ref 8.6–10.4)
CASTS UA: ABNORMAL /LPF
CHLORIDE BLD-SCNC: 104 MMOL/L (ref 98–107)
CO2: 16 MMOL/L (ref 20–31)
COLOR: YELLOW
CREAT SERPL-MCNC: 0.43 MG/DL (ref 0.5–0.9)
EKG ATRIAL RATE: 110 BPM
EKG P AXIS: 57 DEGREES
EKG P-R INTERVAL: 126 MS
EKG Q-T INTERVAL: 348 MS
EKG QRS DURATION: 82 MS
EKG QTC CALCULATION (BAZETT): 470 MS
EKG R AXIS: 59 DEGREES
EKG T AXIS: 56 DEGREES
EKG VENTRICULAR RATE: 110 BPM
EPITHELIAL CELLS UA: ABNORMAL /HPF
ESTIMATED AVERAGE GLUCOSE: 171 MG/DL
ETHANOL PERCENT: 0.13 %
ETHANOL: 127 MG/DL
GFR AFRICAN AMERICAN: >60 ML/MIN
GFR NON-AFRICAN AMERICAN: >60 ML/MIN
GFR SERPL CREATININE-BSD FRML MDRD: ABNORMAL ML/MIN/{1.73_M2}
GLUCOSE BLD-MCNC: 123 MG/DL (ref 65–105)
GLUCOSE BLD-MCNC: 161 MG/DL (ref 65–105)
GLUCOSE BLD-MCNC: 177 MG/DL (ref 65–105)
GLUCOSE BLD-MCNC: 271 MG/DL (ref 65–105)
GLUCOSE BLD-MCNC: 289 MG/DL (ref 65–105)
GLUCOSE BLD-MCNC: 300 MG/DL (ref 70–99)
GLUCOSE URINE: ABNORMAL
HBA1C MFR BLD: 7.6 % (ref 4–6)
KETONES, URINE: ABNORMAL
LEUKOCYTE ESTERASE, URINE: ABNORMAL
MAGNESIUM: 1.6 MG/DL (ref 1.6–2.6)
NITRITE, URINE: NEGATIVE
PH UA: 5.5 (ref 5–8)
PHOSPHORUS: 1.3 MG/DL (ref 2.6–4.5)
POTASSIUM SERPL-SCNC: 3.2 MMOL/L (ref 3.7–5.3)
PROTEIN UA: ABNORMAL
RBC UA: ABNORMAL /HPF
SARS-COV-2, RAPID: NOT DETECTED
SODIUM BLD-SCNC: 137 MMOL/L (ref 135–144)
SPECIFIC GRAVITY UA: 1.03 (ref 1–1.03)
SPECIMEN DESCRIPTION: NORMAL
TOTAL PROTEIN: 5 G/DL (ref 6.4–8.3)
TSH SERPL DL<=0.05 MIU/L-ACNC: 1.88 UIU/ML (ref 0.3–5)
TURBIDITY: ABNORMAL
URINE HGB: ABNORMAL
UROBILINOGEN, URINE: NORMAL
WBC UA: ABNORMAL /HPF

## 2022-06-10 PROCEDURE — G0480 DRUG TEST DEF 1-7 CLASSES: HCPCS

## 2022-06-10 PROCEDURE — 82248 BILIRUBIN DIRECT: CPT

## 2022-06-10 PROCEDURE — 6360000002 HC RX W HCPCS: Performed by: INTERNAL MEDICINE

## 2022-06-10 PROCEDURE — 6370000000 HC RX 637 (ALT 250 FOR IP): Performed by: NURSE PRACTITIONER

## 2022-06-10 PROCEDURE — G0378 HOSPITAL OBSERVATION PER HR: HCPCS

## 2022-06-10 PROCEDURE — 84100 ASSAY OF PHOSPHORUS: CPT

## 2022-06-10 PROCEDURE — 76937 US GUIDE VASCULAR ACCESS: CPT

## 2022-06-10 PROCEDURE — 6360000002 HC RX W HCPCS: Performed by: NURSE PRACTITIONER

## 2022-06-10 PROCEDURE — 36415 COLL VENOUS BLD VENIPUNCTURE: CPT

## 2022-06-10 PROCEDURE — 96375 TX/PRO/DX INJ NEW DRUG ADDON: CPT

## 2022-06-10 PROCEDURE — 2580000003 HC RX 258: Performed by: STUDENT IN AN ORGANIZED HEALTH CARE EDUCATION/TRAINING PROGRAM

## 2022-06-10 PROCEDURE — 87635 SARS-COV-2 COVID-19 AMP PRB: CPT

## 2022-06-10 PROCEDURE — C1751 CATH, INF, PER/CENT/MIDLINE: HCPCS

## 2022-06-10 PROCEDURE — 80053 COMPREHEN METABOLIC PANEL: CPT

## 2022-06-10 PROCEDURE — 6360000002 HC RX W HCPCS: Performed by: STUDENT IN AN ORGANIZED HEALTH CARE EDUCATION/TRAINING PROGRAM

## 2022-06-10 PROCEDURE — 93010 ELECTROCARDIOGRAM REPORT: CPT | Performed by: INTERNAL MEDICINE

## 2022-06-10 PROCEDURE — 96376 TX/PRO/DX INJ SAME DRUG ADON: CPT

## 2022-06-10 PROCEDURE — 82947 ASSAY GLUCOSE BLOOD QUANT: CPT

## 2022-06-10 PROCEDURE — 2500000003 HC RX 250 WO HCPCS: Performed by: NURSE PRACTITIONER

## 2022-06-10 PROCEDURE — 36410 VNPNXR 3YR/> PHY/QHP DX/THER: CPT

## 2022-06-10 PROCEDURE — 6370000000 HC RX 637 (ALT 250 FOR IP): Performed by: STUDENT IN AN ORGANIZED HEALTH CARE EDUCATION/TRAINING PROGRAM

## 2022-06-10 PROCEDURE — 96365 THER/PROPH/DIAG IV INF INIT: CPT

## 2022-06-10 PROCEDURE — 84443 ASSAY THYROID STIM HORMONE: CPT

## 2022-06-10 PROCEDURE — 05H933Z INSERTION OF INFUSION DEVICE INTO RIGHT BRACHIAL VEIN, PERCUTANEOUS APPROACH: ICD-10-PCS | Performed by: INTERNAL MEDICINE

## 2022-06-10 PROCEDURE — 2580000003 HC RX 258: Performed by: NURSE PRACTITIONER

## 2022-06-10 PROCEDURE — 81001 URINALYSIS AUTO W/SCOPE: CPT

## 2022-06-10 PROCEDURE — 99223 1ST HOSP IP/OBS HIGH 75: CPT | Performed by: INTERNAL MEDICINE

## 2022-06-10 PROCEDURE — 2500000003 HC RX 250 WO HCPCS: Performed by: STUDENT IN AN ORGANIZED HEALTH CARE EDUCATION/TRAINING PROGRAM

## 2022-06-10 PROCEDURE — 94761 N-INVAS EAR/PLS OXIMETRY MLT: CPT

## 2022-06-10 PROCEDURE — 83735 ASSAY OF MAGNESIUM: CPT

## 2022-06-10 RX ORDER — LANOLIN ALCOHOL/MO/W.PET/CERES
400 CREAM (GRAM) TOPICAL 2 TIMES DAILY
Status: COMPLETED | OUTPATIENT
Start: 2022-06-10 | End: 2022-06-10

## 2022-06-10 RX ORDER — GAUZE BANDAGE 2" X 2"
100 BANDAGE TOPICAL DAILY
Status: DISCONTINUED | OUTPATIENT
Start: 2022-06-10 | End: 2022-06-12 | Stop reason: HOSPADM

## 2022-06-10 RX ORDER — SODIUM CHLORIDE 0.9 % (FLUSH) 0.9 %
10 SYRINGE (ML) INJECTION PRN
Status: DISCONTINUED | OUTPATIENT
Start: 2022-06-10 | End: 2022-06-12 | Stop reason: HOSPADM

## 2022-06-10 RX ORDER — LORAZEPAM 2 MG/ML
1 INJECTION INTRAMUSCULAR ONCE
Status: COMPLETED | OUTPATIENT
Start: 2022-06-10 | End: 2022-06-10

## 2022-06-10 RX ORDER — LORAZEPAM 1 MG/1
2 TABLET ORAL
Status: DISCONTINUED | OUTPATIENT
Start: 2022-06-10 | End: 2022-06-10

## 2022-06-10 RX ORDER — FOLIC ACID 1 MG/1
1 TABLET ORAL DAILY
Status: DISCONTINUED | OUTPATIENT
Start: 2022-06-10 | End: 2022-06-12 | Stop reason: HOSPADM

## 2022-06-10 RX ORDER — M-VIT,TX,IRON,MINS/CALC/FOLIC 27MG-0.4MG
1 TABLET ORAL DAILY
Status: DISCONTINUED | OUTPATIENT
Start: 2022-06-10 | End: 2022-06-12 | Stop reason: HOSPADM

## 2022-06-10 RX ORDER — LAMOTRIGINE 200 MG/1
200 TABLET ORAL DAILY
Status: ON HOLD | COMMUNITY
End: 2022-06-12 | Stop reason: HOSPADM

## 2022-06-10 RX ORDER — SODIUM CHLORIDE 0.9 % (FLUSH) 0.9 %
5-40 SYRINGE (ML) INJECTION EVERY 12 HOURS SCHEDULED
Status: DISCONTINUED | OUTPATIENT
Start: 2022-06-10 | End: 2022-06-12 | Stop reason: HOSPADM

## 2022-06-10 RX ORDER — INSULIN GLARGINE 100 [IU]/ML
50 INJECTION, SOLUTION SUBCUTANEOUS DAILY
Status: DISCONTINUED | OUTPATIENT
Start: 2022-06-10 | End: 2022-06-12 | Stop reason: HOSPADM

## 2022-06-10 RX ORDER — POTASSIUM CHLORIDE 20 MEQ/1
40 TABLET, EXTENDED RELEASE ORAL PRN
Status: DISCONTINUED | OUTPATIENT
Start: 2022-06-10 | End: 2022-06-10

## 2022-06-10 RX ORDER — CIPROFLOXACIN 500 MG/1
500 TABLET, FILM COATED ORAL
Status: DISCONTINUED | OUTPATIENT
Start: 2022-06-10 | End: 2022-06-11

## 2022-06-10 RX ORDER — GAUZE BANDAGE 2" X 2"
100 BANDAGE TOPICAL DAILY
Status: DISCONTINUED | OUTPATIENT
Start: 2022-06-10 | End: 2022-06-10

## 2022-06-10 RX ORDER — MAGNESIUM SULFATE 1 G/100ML
1000 INJECTION INTRAVENOUS ONCE
Status: DISCONTINUED | OUTPATIENT
Start: 2022-06-10 | End: 2022-06-10

## 2022-06-10 RX ORDER — INSULIN LISPRO 100 [IU]/ML
0-6 INJECTION, SOLUTION INTRAVENOUS; SUBCUTANEOUS NIGHTLY
Status: DISCONTINUED | OUTPATIENT
Start: 2022-06-10 | End: 2022-06-12 | Stop reason: HOSPADM

## 2022-06-10 RX ORDER — ONDANSETRON 4 MG/1
4 TABLET, ORALLY DISINTEGRATING ORAL EVERY 8 HOURS PRN
Status: DISCONTINUED | OUTPATIENT
Start: 2022-06-10 | End: 2022-06-12 | Stop reason: HOSPADM

## 2022-06-10 RX ORDER — LORAZEPAM 2 MG/ML
1 INJECTION INTRAMUSCULAR
Status: DISCONTINUED | OUTPATIENT
Start: 2022-06-10 | End: 2022-06-10

## 2022-06-10 RX ORDER — LORAZEPAM 2 MG/ML
3 INJECTION INTRAMUSCULAR
Status: DISCONTINUED | OUTPATIENT
Start: 2022-06-10 | End: 2022-06-10

## 2022-06-10 RX ORDER — POLYETHYLENE GLYCOL 3350 17 G/17G
17 POWDER, FOR SOLUTION ORAL DAILY PRN
Status: DISCONTINUED | OUTPATIENT
Start: 2022-06-10 | End: 2022-06-12 | Stop reason: HOSPADM

## 2022-06-10 RX ORDER — ENOXAPARIN SODIUM 100 MG/ML
30 INJECTION SUBCUTANEOUS 2 TIMES DAILY
Status: CANCELLED | OUTPATIENT
Start: 2022-06-10

## 2022-06-10 RX ORDER — ACETAMINOPHEN 325 MG/1
650 TABLET ORAL EVERY 6 HOURS PRN
Status: DISCONTINUED | OUTPATIENT
Start: 2022-06-10 | End: 2022-06-10

## 2022-06-10 RX ORDER — TRAZODONE HYDROCHLORIDE 100 MG/1
100 TABLET ORAL NIGHTLY PRN
Status: DISCONTINUED | OUTPATIENT
Start: 2022-06-10 | End: 2022-06-12 | Stop reason: HOSPADM

## 2022-06-10 RX ORDER — DILTIAZEM HYDROCHLORIDE 180 MG/1
360 CAPSULE, COATED, EXTENDED RELEASE ORAL DAILY
Status: DISCONTINUED | OUTPATIENT
Start: 2022-06-10 | End: 2022-06-12 | Stop reason: HOSPADM

## 2022-06-10 RX ORDER — POTASSIUM CHLORIDE 7.45 MG/ML
10 INJECTION INTRAVENOUS PRN
Status: DISCONTINUED | OUTPATIENT
Start: 2022-06-10 | End: 2022-06-10

## 2022-06-10 RX ORDER — LORAZEPAM 1 MG/1
1 TABLET ORAL
Status: DISCONTINUED | OUTPATIENT
Start: 2022-06-10 | End: 2022-06-10

## 2022-06-10 RX ORDER — VITAMIN B COMPLEX
1000 TABLET ORAL DAILY
Status: DISCONTINUED | OUTPATIENT
Start: 2022-06-10 | End: 2022-06-12 | Stop reason: HOSPADM

## 2022-06-10 RX ORDER — FAMOTIDINE 20 MG/1
20 TABLET, FILM COATED ORAL 2 TIMES DAILY
Status: DISCONTINUED | OUTPATIENT
Start: 2022-06-10 | End: 2022-06-12 | Stop reason: HOSPADM

## 2022-06-10 RX ORDER — ACETAMINOPHEN 650 MG/1
650 SUPPOSITORY RECTAL EVERY 6 HOURS PRN
Status: DISCONTINUED | OUTPATIENT
Start: 2022-06-10 | End: 2022-06-10

## 2022-06-10 RX ORDER — SODIUM CHLORIDE 9 MG/ML
INJECTION, SOLUTION INTRAVENOUS PRN
Status: DISCONTINUED | OUTPATIENT
Start: 2022-06-10 | End: 2022-06-12 | Stop reason: HOSPADM

## 2022-06-10 RX ORDER — PRAVASTATIN SODIUM 40 MG
40 TABLET ORAL NIGHTLY
Status: DISCONTINUED | OUTPATIENT
Start: 2022-06-10 | End: 2022-06-12 | Stop reason: HOSPADM

## 2022-06-10 RX ORDER — LORAZEPAM 1 MG/1
3 TABLET ORAL
Status: DISCONTINUED | OUTPATIENT
Start: 2022-06-10 | End: 2022-06-10

## 2022-06-10 RX ORDER — SODIUM CHLORIDE 0.9 % (FLUSH) 0.9 %
5-40 SYRINGE (ML) INJECTION PRN
Status: DISCONTINUED | OUTPATIENT
Start: 2022-06-10 | End: 2022-06-12 | Stop reason: HOSPADM

## 2022-06-10 RX ORDER — LORAZEPAM 2 MG/ML
2 INJECTION INTRAMUSCULAR EVERY 6 HOURS PRN
Status: DISCONTINUED | OUTPATIENT
Start: 2022-06-10 | End: 2022-06-12 | Stop reason: HOSPADM

## 2022-06-10 RX ORDER — LORAZEPAM 2 MG/ML
2 INJECTION INTRAMUSCULAR
Status: DISCONTINUED | OUTPATIENT
Start: 2022-06-10 | End: 2022-06-10

## 2022-06-10 RX ORDER — FONDAPARINUX SODIUM 10 MG/.8ML
10 INJECTION SUBCUTANEOUS DAILY
Status: DISCONTINUED | OUTPATIENT
Start: 2022-06-10 | End: 2022-06-12 | Stop reason: HOSPADM

## 2022-06-10 RX ORDER — SODIUM CHLORIDE 9 MG/ML
INJECTION, SOLUTION INTRAVENOUS CONTINUOUS
Status: DISCONTINUED | OUTPATIENT
Start: 2022-06-10 | End: 2022-06-12 | Stop reason: HOSPADM

## 2022-06-10 RX ORDER — ONDANSETRON 2 MG/ML
4 INJECTION INTRAMUSCULAR; INTRAVENOUS EVERY 6 HOURS PRN
Status: DISCONTINUED | OUTPATIENT
Start: 2022-06-10 | End: 2022-06-12 | Stop reason: HOSPADM

## 2022-06-10 RX ORDER — LORAZEPAM 2 MG/ML
4 INJECTION INTRAMUSCULAR
Status: DISCONTINUED | OUTPATIENT
Start: 2022-06-10 | End: 2022-06-10

## 2022-06-10 RX ORDER — DEXTROSE MONOHYDRATE 50 MG/ML
100 INJECTION, SOLUTION INTRAVENOUS PRN
Status: DISCONTINUED | OUTPATIENT
Start: 2022-06-10 | End: 2022-06-12 | Stop reason: HOSPADM

## 2022-06-10 RX ORDER — BUPROPION HYDROCHLORIDE 300 MG/1
300 TABLET ORAL EVERY MORNING
Status: ON HOLD | COMMUNITY
End: 2022-06-12 | Stop reason: HOSPADM

## 2022-06-10 RX ORDER — LORAZEPAM 1 MG/1
4 TABLET ORAL
Status: DISCONTINUED | OUTPATIENT
Start: 2022-06-10 | End: 2022-06-10

## 2022-06-10 RX ORDER — FLUOXETINE HYDROCHLORIDE 20 MG/1
40 CAPSULE ORAL DAILY
Status: DISCONTINUED | OUTPATIENT
Start: 2022-06-10 | End: 2022-06-12 | Stop reason: HOSPADM

## 2022-06-10 RX ORDER — INSULIN LISPRO 100 [IU]/ML
0-12 INJECTION, SOLUTION INTRAVENOUS; SUBCUTANEOUS
Status: DISCONTINUED | OUTPATIENT
Start: 2022-06-10 | End: 2022-06-12 | Stop reason: HOSPADM

## 2022-06-10 RX ORDER — LOSARTAN POTASSIUM 50 MG/1
50 TABLET ORAL DAILY
Status: DISCONTINUED | OUTPATIENT
Start: 2022-06-10 | End: 2022-06-12 | Stop reason: HOSPADM

## 2022-06-10 RX ORDER — CHLORDIAZEPOXIDE HYDROCHLORIDE 25 MG/1
25 CAPSULE, GELATIN COATED ORAL 3 TIMES DAILY
Status: DISCONTINUED | OUTPATIENT
Start: 2022-06-10 | End: 2022-06-10

## 2022-06-10 RX ORDER — HYDROXYZINE HYDROCHLORIDE 10 MG/1
10 TABLET, FILM COATED ORAL 3 TIMES DAILY PRN
Status: DISCONTINUED | OUTPATIENT
Start: 2022-06-10 | End: 2022-06-12 | Stop reason: HOSPADM

## 2022-06-10 RX ADMIN — Medication 1000 UNITS: at 08:23

## 2022-06-10 RX ADMIN — FLUOXETINE HYDROCHLORIDE 40 MG: 20 CAPSULE ORAL at 08:23

## 2022-06-10 RX ADMIN — SODIUM CHLORIDE, PRESERVATIVE FREE 10 ML: 5 INJECTION INTRAVENOUS at 21:19

## 2022-06-10 RX ADMIN — CEFTRIAXONE SODIUM 1000 MG: 1 INJECTION, POWDER, FOR SOLUTION INTRAMUSCULAR; INTRAVENOUS at 02:24

## 2022-06-10 RX ADMIN — LORAZEPAM 1 MG: 2 INJECTION, SOLUTION INTRAMUSCULAR; INTRAVENOUS at 01:41

## 2022-06-10 RX ADMIN — Medication 400 MG: at 13:38

## 2022-06-10 RX ADMIN — LOSARTAN POTASSIUM 50 MG: 50 TABLET, FILM COATED ORAL at 08:23

## 2022-06-10 RX ADMIN — ONDANSETRON 4 MG: 2 INJECTION INTRAMUSCULAR; INTRAVENOUS at 08:34

## 2022-06-10 RX ADMIN — INSULIN GLARGINE 50 UNITS: 100 INJECTION, SOLUTION SUBCUTANEOUS at 08:33

## 2022-06-10 RX ADMIN — ANTI-FUNGAL POWDER MICONAZOLE NITRATE TALC FREE: 1.42 POWDER TOPICAL at 22:37

## 2022-06-10 RX ADMIN — LORAZEPAM 2 MG: 1 TABLET ORAL at 00:21

## 2022-06-10 RX ADMIN — THIAMINE HCL TAB 100 MG 100 MG: 100 TAB at 08:24

## 2022-06-10 RX ADMIN — LORAZEPAM 2 MG: 2 INJECTION INTRAMUSCULAR; INTRAVENOUS at 23:08

## 2022-06-10 RX ADMIN — FONDAPARINUX SODIUM 10 MG: 10 INJECTION, SOLUTION SUBCUTANEOUS at 17:58

## 2022-06-10 RX ADMIN — INSULIN LISPRO 2 UNITS: 100 INJECTION, SOLUTION INTRAVENOUS; SUBCUTANEOUS at 17:38

## 2022-06-10 RX ADMIN — INSULIN LISPRO 1 UNITS: 100 INJECTION, SOLUTION INTRAVENOUS; SUBCUTANEOUS at 21:34

## 2022-06-10 RX ADMIN — FAMOTIDINE 20 MG: 20 TABLET, FILM COATED ORAL at 21:29

## 2022-06-10 RX ADMIN — INSULIN LISPRO 6 UNITS: 100 INJECTION, SOLUTION INTRAVENOUS; SUBCUTANEOUS at 08:33

## 2022-06-10 RX ADMIN — SODIUM CHLORIDE: 9 INJECTION, SOLUTION INTRAVENOUS at 01:55

## 2022-06-10 RX ADMIN — DILTIAZEM HYDROCHLORIDE 360 MG: 180 CAPSULE, COATED, EXTENDED RELEASE ORAL at 08:22

## 2022-06-10 RX ADMIN — POTASSIUM CHLORIDE 40 MEQ: 20 TABLET, EXTENDED RELEASE ORAL at 08:34

## 2022-06-10 RX ADMIN — ONDANSETRON 4 MG: 2 INJECTION INTRAMUSCULAR; INTRAVENOUS at 23:08

## 2022-06-10 RX ADMIN — LORAZEPAM 2 MG: 2 INJECTION INTRAMUSCULAR; INTRAVENOUS at 17:19

## 2022-06-10 RX ADMIN — MULTIPLE VITAMINS W/ MINERALS TAB 1 TABLET: TAB at 13:38

## 2022-06-10 RX ADMIN — Medication 400 MG: at 21:29

## 2022-06-10 RX ADMIN — FAMOTIDINE 20 MG: 20 TABLET, FILM COATED ORAL at 08:23

## 2022-06-10 RX ADMIN — POTASSIUM PHOSPHATE, MONOBASIC AND POTASSIUM PHOSPHATE, DIBASIC 10 MMOL: 224; 236 INJECTION, SOLUTION, CONCENTRATE INTRAVENOUS at 08:45

## 2022-06-10 RX ADMIN — THIAMINE HCL TAB 100 MG 100 MG: 100 TAB at 13:38

## 2022-06-10 RX ADMIN — ONDANSETRON 4 MG: 2 INJECTION INTRAMUSCULAR; INTRAVENOUS at 16:46

## 2022-06-10 RX ADMIN — FOLIC ACID 1 MG: 1 TABLET ORAL at 13:38

## 2022-06-10 RX ADMIN — CIPROFLOXACIN HYDROCHLORIDE 500 MG: 500 TABLET, FILM COATED ORAL at 17:21

## 2022-06-10 RX ADMIN — DIBASIC SODIUM PHOSPHATE, MONOBASIC POTASSIUM PHOSPHATE AND MONOBASIC SODIUM PHOSPHATE 1 TABLET: 852; 155; 130 TABLET ORAL at 22:36

## 2022-06-10 ASSESSMENT — ENCOUNTER SYMPTOMS
DIARRHEA: 0
NAUSEA: 0
VOMITING: 0
SHORTNESS OF BREATH: 0
NAUSEA: 1
STRIDOR: 0
CONSTIPATION: 0
ABDOMINAL PAIN: 0
WHEEZING: 0
BLOOD IN STOOL: 0
COLOR CHANGE: 1
COUGH: 0

## 2022-06-10 ASSESSMENT — PAIN DESCRIPTION - LOCATION: LOCATION: HEAD

## 2022-06-10 ASSESSMENT — PAIN SCALES - GENERAL: PAINLEVEL_OUTOF10: 0

## 2022-06-10 NOTE — ED PROVIDER NOTES
EMERGENCY DEPARTMENT ENCOUNTER   ATTENDING ATTESTATION     Pt Name: Barnie Denver  MRN: [id number]  [place] 1980  Date of evaluation: 6/9/22       Barnie Denver is a 42 y.o. female who presents with Alcohol Intoxication      MDM:   Alcohol intoxication  Patient states that she has been drinking multiple shots of fireball  Also hyperglycemic does have a history of diabetes  Patient denies any suicidal or homicidal ideations at this time  Patient was initially concerned that she was going through withdrawal from alcohol  Plan for labs, ethanol level, IV fluids, reassessment    Concern for DKA    Vitals:   Vitals:    06/09/22 1951   BP: 122/86   Pulse: (!) 116   Resp: 14   Temp: 98 °F (36.7 °C)   TempSrc: Axillary   SpO2: 99%   Weight: 280 lb (127 kg)   Height: 5' 9\" (1.753 m)       PHYSICAL:   Temp: 98 °F (36.7 °C),  Heart Rate: (!) 116, Resp: 14, BP: 122/86, SpO2: 99 %  Gen: Non-toxic, Afebrile  Neck: Supple  Cards: Regular rate and rhythm  Pulm: Lung sounds clear to auscultation  Abdomen: Soft, non-tender, non-distended. Large red beefy rash under patient's abdomen consistent with a fungal infection  Skin: warm, dry  Extremities: pulses 2+ radial / dorsalis pedis, no clubbing, cyanosis, edema  Neurologic: CAOX3, no facial asymmetry, no dysarthria or aphasia       EKG    EKG Interpretation    Interpreted by me    Rhythm: Sinus tachycardia  Rate: Tachycardic, 110  Axis: normal  Ectopy: none  Conduction: normal  ST Segments: no acute change  T Waves: Inversion lead II  Q Waves: none    Clinical Impression: Sinus tachycardia rate of 110. No ST segment changes, no other arrhythmia, no ectopy. Normal axis, poor R wave progression. All EKG's are interpreted by the Emergency Department Physician whoeither signs or Co-signs this chart in the absence of a cardiologist.    I personally saw and examined the patient.  I have reviewed and agree with the resident's findings, including all diagnostic interpretations and treatment plan as written. I was present for the key portions of any procedures performed and the inclusive time noted for any critical care statement. There was a high probability of clinically significant/life threatening deterioration in this patient's condition which required my urgent intervention. Total critical care time was 15 minutes. This excludes any time for separately reportable procedures. ED Course as of 06/10/22 0206   Fri Bg 10, 2022   0021 New ultrasound guided IV placed in the right AC, 20-gauge. [AP]      ED Course User Index  [AP] Emile Matamoros DO       The care is provided during an unprecedented national emergency due to the novel coronavirus, COVID 19.   Emile Matamoros DO  Attending Emergency Physician         Emile Matamoros DO  06/10/22 5700

## 2022-06-10 NOTE — PROGRESS NOTES
Pt's mother called. She's very concerned. The pt states all the time she wants to kill herself to her mom and friends but then denies it to health care personnel so she can be d/c home. The pt's mother stated that she goes in binges when she drinks. She isn't a daily drinker but drinks very heavily at least once a month. Her mom said this her third hospital admission since March.      Ramana Kaufman (her mom) also stated she's very depressed and bipolar and needs a good psychiatrist.     RN stated she will make note of the above conversation and correlate it to the doctors

## 2022-06-10 NOTE — PROGRESS NOTES
HR noted elevated in 120-150's. Dr. Lazaro Perez notified. Ativan 2 mg ordered PRN every 6 hrs.  He stated HR issues are d/t withdraws

## 2022-06-10 NOTE — ED NOTES
Report given to Ofelia Barrett RN from U. Report method by phone   The following was reviewed with receiving RN:   Current vital signs:  /75   Pulse (!) 126   Temp 98.6 °F (37 °C) (Oral)   Resp 18   Ht 5' 9\" (1.753 m)   Wt 280 lb (127 kg)   SpO2 93%   BMI 41.35 kg/m²                MEWS Score: 3     Any medication or safety alerts were reviewed. Any pending diagnostics and notifications were also reviewed, as well as any safety concerns or issues, abnormal labs, abnormal imaging, and abnormal assessment findings. Questions were answered.             [facility]  06/10/22 5984

## 2022-06-10 NOTE — PROGRESS NOTES
Progress Note:    Patient evaluation bedside and alcohol withdrawal seems unlikely. Patient does have a significant alcohol history but has completed a 12 step program in the past.  She was sober for 3 years about 10 years ago but has started to drink again recently. She is not an every day drinker and has a sponsor with whom she communicates regularly with; patient's sponsor is currently in [place]. Patient has a history of Antiphospholipid antibody syndrome and has lost 5 children in utero due to her condition. In fact there pregnancy loses are what led to further investigation and uncovering her condition. Yesterday marked the 20th anniversary of the loss of one her children and to cope patient turned to alcohol. Her last drink was about 5 days ago and was not as extreme as yesterday. Physical Exam:   -Minimal flapping tremor noticed both with hands extended and with examination of the tongue. Vitals reviewed:  -Tachycardia is sinus and most likely related to anxiety.  -Telemetry reviewed. -Home Cardizem restarted and given to patient.   -Will continue to monitor    Labs Reviewed:  -Electrolytes supplemented  -Sugars corrected  -Urine analysis reviewed. Assessment/Plan:  -Due to poor access IR has been consulted for midline placement.  -Continue gentle fluid hydration. Oral diabetic carb controlled diet has started. -CIWA scale and Librium discontinued and alcohol withdrawal unlikely. -Acetaminophen PRN discontinued due to elevated LFT. -Statin held due to elevated LFT. -Atarax started due to anxiety.  -Previous urine cultures reviewed. Patient has history of urine culture positive for K. Pneumoniae and E. Faecalis. Transitioned to oral ciprofloxacin for 3 days. -EKG reviewed. . Magnesium to be supplemented prior to giving ciprofloxacin.  -Psychiatry consulted and evaluation pending. Recommendations appreciated.      Disposition:   -Possible discharge tomorrow pending psychiatry recommendations. Further recommendations after discussion with Dr. Beau Ferguson. Thomas Chapman MD  PGY-2, Internal Medicine Resident  [facility]  6/10/2022 11:23 AM     Attestation and add on       I have discussed the care of Derrell Dorman , including pertinent history and exam findings,      6/10/22    with the resident. I have seen and examined the patient and the key elements of all parts of the encounter have been performed by me . I agree with the assessment, plan and orders as documented by the resident. Hospital Problems           Last Modified POA    * (Principal) Alcoholic intoxication without complication ([address] 5/62/9597 Yes    Transaminasemia 6/10/2022 Yes    Type 2 diabetes mellitus with diabetic polyneuropathy, with long-term current use of insulin ([address] 6/10/2022 Yes    Essential hypertension (Chronic) 6/10/2022 Yes    Bipolar affective disorder in remission ([address] 6/10/2022 Yes    History of pulmonary embolism 6/10/2022 Yes    Antiphospholipid syndrome ([address] 6/10/2022 Yes    Alcohol intoxication ([address] 6/10/2022 Yes    Severe benzodiazepine use disorder ([address] 6/10/2022 Yes    Acute cystitis with hematuria 6/10/2022 Yes    History of pulmonary embolus (PE) 6/10/2022 Yes             ''''''''''       MD MASON Stallings[address], [address] [place] [address].    Phone [phone]   Fax: [phone]  Answering Service: [phone]

## 2022-06-10 NOTE — PROGRESS NOTES
Dr. Tess Juarez notified of pt's IV in her neck not functioning properly. As well as her elevated HR.  Order placed for Midline

## 2022-06-10 NOTE — CARE COORDINATION
CASE MANAGEMENT NOTE:    Admission Date:  6/9/2022 Dow Frankel is a 42 y.o.  female    Admitted for : Tachycardia [E59.4]  Alcoholic intoxication without complication ([facility] [P43.753]  Acute alcoholic intoxication without complication ([facility] [H50.234]    Met with:  Patient    PCP:  Farida Hernandez                                Insurance:  Medicare      Is patient alert and oriented at time of discussion:  Yes    Current Residence/ Living Arrangements:  independently at home w/  Steffen Soto            Current Services PTA:  Yes, Follows at [facility]    Does patient go to outpatient dialysis: No  If yes, location and chair time: NA    Is patient agreeable to VNS: No    Freedom of choice provided:  No    List of [address] provided: No    VNS chosen:  No    DME:  Glucometer/supplies    Home Oxygen: No    Nebulizer: No    CPAP/BIPAP: No    Supplier: N/A    Potential Assistance Needed: ? BHI, Psych to see    SNF needed: No    Freedom of choice and list provided: NA    Pharmacy:  [facility]       Is patient currently receiving oral anticoagulation therapy? No    Is the Patient an LAKISHA PANG [facility] with Readmission Risk Score greater than 14%? No  If yes, pt needs a follow up appointment made within 7 days. Family Members/Caregivers that pt would like involved in their care:    Yes    If yes, list name here:  Mom, Gio Ospina, Steffen Soto    Transportation Provider:  Patient             Discharge Plan:  6/10/22 Medicare Pt. Lives in [place] w/ . DME- Glucometer/supplies. Denies VNS. Current w/ [facility]. Alcohol, 356, Binge Drinks, Denies Info from LSW, Psych to see. K+ 3.2, Elevated LFT's. [employer] NA.  Will follow for any needs//KB                 Electronically signed by: Carol Cook RN on 6/10/2022 at 11:53 AM

## 2022-06-10 NOTE — ED PROVIDER NOTES
[facility] ED  Emergency Department Encounter  Emergency Medicine Resident     Pt Name: Winton Lesch  MRN: [id number]  [place] 1980  Date of evaluation: 6/10/22  PCP:  Jo Soriano       Chief Complaint   Patient presents with    Alcohol Intoxication       HISTORY OFPRESENT ILLNESS  (Location/Symptom, Timing/Onset, Context/Setting, Quality, Duration, Modifying Alfredito Button.)      Winton Lesch is a 42 y.o. female with past medical history of alcohol abuse, bipolar disorder, morbid obesity, pulmonary embolism on long-term anticoagulation, suicide attempt, SVT, type 2 diabetes who presents via EMS due to complaints of alcohol intoxication. Per EMS family called due to concerns of alcohol intoxication. Patient reportedly drank \"20 many fireball shooters \"intermittent suicide attempt. Patient was asked about this and denies homicidal/suicidal ideations at this time. Patient states she does feel anxious as well as tremulous and is requesting Ativan if she feels she is going through alcohol withdrawals at this time. Patient does endorse suprapubic abdominal pain in the area of a skin rash. She denies chest pain, shortness of breath this time. She states she has been compliant with her anticoagulation medications. No fall or trauma. Per EMS patient was also hyperglycemic in the 400s while on scene.     PAST MEDICAL / SURGICAL / SOCIAL / FAMILY HISTORY      has a past medical history of Alcohol abuse, Anxiety, Arthritis, Painting esophagus, Benzodiazepine overdose, Bipolar disorder, unspecified ([address] Bipolar I disorder, most recent episode depressed, severe without psychotic features ([address] Cellulitis, Chronic abdominal wound infection, Constipation, Depression, Drug overdose, intentional ([address] Genital herpes, unspecified, GERD (gastroesophageal reflux disease), History of pulmonary embolism, Hx of blood clots, Hypertension, Iron deficiency anemia, Isopropyl alcohol poisoning, Lumbosacral spondylosis without myelopathy, MDRO (multiple drug resistant organisms) resistance, Miscarriage, Morbid obesity (HCC), MRSA (methicillin resistant staph aureus) culture positive, Overdose of benzodiazepine, Pernicious anemia, Primary hypercoagulable state ([facility] Pulmonary embolism ([facility] Suicidal behavior, Suicidal ideation, Suicide attempt ([facility] SVT (supraventricular tachycardia) ([facility] Toxic effect of ethanol, intentional self-harm ([facility] and Type 2 diabetes mellitus, with long-term current use of insulin ([facility] has a past surgical history that includes Cholecystectomy; Liver Resection; hernia repair; Tonsillectomy; Endoscopy, colon, diagnostic; Abdominal hernia repair (2014); Abdominal hernia repair (11/3/14); Bariatric Surgery (2013); Dilation and curettage of uterus (2005); Finger amputation (Left, 02/09/2015); lymph node biopsy (1990); Total abdominal hysterectomy w/ bilateral salpingoophorectomy (2011); and IVC filter insertion. Social History     Socioeconomic History    Marital status:      Spouse name: Not on file    Number of children: 1    Years of education: 3 years of college    Highest education level: Not on file   Occupational History    Occupation: infant care     Comment: last worked 2008   Tobacco Use    Smoking status: Never Smoker    Smokeless tobacco: Never Used   Substance and Sexual Activity    Alcohol use: No     Alcohol/week: 0.0 standard drinks     Comment: Last alcohol use was in 12/2015    Drug use: No     Comment: Pt stole her mom's Benzo 1 yr ago. Pt said she took more than prescribed dose of Valium once in late 90's.     Sexual activity: Yes     Partners: Male   Other Topics Concern    Not on file   Social History Narrative    Lives with Cash Carmona ex  and daughter          Social Determinants of Health     Financial Resource Strain:     Difficulty of Paying Living Expenses: Not on file   Food Insecurity:     Worried About [facility] in the Last Year: Not on file    [place] of Food in the Last Year: Not on file   Transportation Needs:     Lack of Transportation (Medical): Not on file    Lack of Transportation (Non-Medical): Not on file   Physical Activity:     Days of Exercise per Week: Not on file    Minutes of Exercise per Session: Not on file   Stress:     Feeling of Stress : Not on file   Social Connections:     Frequency of Communication with Friends and Family: Not on file    Frequency of Social Gatherings with Friends and Family: Not on file    Attends Restorationism Services: Not on file    Active Member of [address] [place] [address] or Organizations: Not on file    Attends Club or Organization Meetings: Not on file    Marital Status: Not on file   Intimate Partner Violence:     Fear of Current or Ex-Partner: Not on file    Emotionally Abused: Not on file    Physically Abused: Not on file    Sexually Abused: Not on file   Housing Stability:     Unable to Pay for Housing in the Last Year: Not on file    Number of [place] in the Last Year: Not on file    Unstable Housing in the Last Year: Not on file       Family History   Problem Relation Age of Onset    Depression Mother     High Blood Pressure Mother     High Cholesterol Mother     Diabetes Father     Heart Disease Father     Kidney Disease Father     High Blood Pressure Father     High Cholesterol Father     Cancer Maternal Uncle         of duodenum had whipple    Breast Cancer Maternal Aunt     Breast Cancer Maternal Cousin        Allergies:  Asa [aspirin], Betadine [povidone iodine], Celexa [citalopram hydrobromide], Citalopram, Lasix [furosemide], Macrobid [nitrofurantoin], Norco [hydrocodone-acetaminophen], Betadine [povidone iodine], Codeine, Lithium, Paroxetine, Paxil [paroxetine hcl], Tape [adhesive tape], and Tegretol [carbamazepine]    Home Medications:  Prior to Admission medications    Medication Sig Start Date End Date Taking? Authorizing Provider   dilTIAZem (CARDIZEM CD) 360 MG extended release capsule Take 360 mg by mouth daily    Historical Provider, MD   Insulin Glargine, 2 Unit Dial, (TOUJEO MAX SOLOSTAR) 300 UNIT/ML SOPN Inject 50 Units into the skin daily    Historical Provider, MD   cyclobenzaprine (FLEXERIL) 10 mg tablet Take 10 mg by mouth 3 times daily as needed for Muscle spasms    Historical Provider, MD   insulin aspart (NOVOLOG FLEXPEN) 100 UNIT/ML injection pen Inject 5-10 Units into the skin 3 times daily (before meals) Per carb ratio    Historical Provider, MD   losartan (COZAAR) 50 mg tablet Take 50 mg by mouth daily    Historical Provider, MD   HYDROcodone-acetaminophen (NORCO) 5-325 MG per tablet Take 1 tablet by mouth every 6 hours as needed for Pain.  Indications: Last filled 22 for 30 days    Historical Provider, MD   traZODone (DESYREL) 100 MG tablet Take 100 mg by mouth nightly as needed for Sleep    Historical Provider, MD   FLUoxetine (PROZAC) 20 MG capsule Take 2 capsules by mouth daily 3/19/22   Deepti Daley MD   famotidine (PEPCID) 20 MG tablet TAKE 1 TABLET BY MOUTH TWICE DAILY 21   Lynann Apgar, MD   fondaparinux (ARIXTRA) 10 MG/0.8ML SOLN injection Inject 0.8 mLs into the skin daily 3/30/21   Lynann Apgar, MD   cyanocobalamin 1000 MCG/ML injection Inject 1 mL into the muscle every 7 days 21   Lynann Apgar, MD   Syringe/Needle, Disp, (SYRINGE 3CC/25GX1\") 25G X 1\" 3 ML MISC To be used with B12 injections monthly 21   Lynann Apgar, MD   FARXIGA 10 MG tablet TAKE 1 TABLET BY MOUTH EVERY MORNING 20   Lynann Apgar, MD   pravastatin (PRAVACHOL) 40 MG tablet TAKE 1 TABLET(40 MG) BY MOUTH DAILY 20   Lynann Apgar, MD   valACYclovir (VALTREX) 500 MG tablet Take 1 tablet by mouth daily 20   Lynann Apgar, MD   acetaminophen (TYLENOL) 500 MG tablet Take 2 tablets by mouth every 8 hours as needed for Pain 20   Christopher Russo 1721, DO GLUCAGEN HYPOKIT 1 MG SOLR injection  12/19/19   Historical Provider, MD   glucose monitoring kit (FREESTYLE) monitoring kit Testing twice a day. Please dispense according to patients insurance. 12/20/19   Arleth He MD   Insulin Pen Needle 31G X 5 MM MISC 1 each by Does not apply route daily 12/20/19   Arleth He MD   Lancets 30G MISC Testing twice a day. Please dispense according to patients insurance. 12/20/19   Arleth He MD   blood glucose monitor strips Testing twice a day. Please dispense according to patients insurance. 12/20/19   Arleth He MD   Multiple Vitamins-Minerals (THERAPEUTIC MULTIVITAMIN-MINERALS) tablet Take 1 tablet by mouth daily    Historical Provider, MD   vitamin D (CHOLECALCIFEROL) 1000 UNIT TABS tablet Take 1,000 Units by mouth daily     Historical Provider, MD       REVIEW OF SYSTEMS    (2-9 systems for level 4, 10 or more for level 5)      Review of Systems   Constitutional: Negative for fever. HENT: Negative for congestion. Respiratory: Negative for cough and shortness of breath. Cardiovascular: Negative for chest pain. Gastrointestinal: Negative for abdominal pain, nausea and vomiting. Genitourinary: Negative for dysuria. Musculoskeletal: Negative for myalgias. Skin: Positive for color change and rash. Neurological: Negative for headaches. Psychiatric/Behavioral: The patient is nervous/anxious. PHYSICAL EXAM   (up to 7 for level 4, 8 or more for level 5)     INITIAL VITALS:    height is 5' 9\" (1.753 m) and weight is 280 lb (127 kg). Her axillary temperature is 98 °F (36.7 °C). Her blood pressure is 102/85 and her pulse is 105 (abnormal). Her respiration is 14 and oxygen saturation is 99%.      Physical Exam  Constitutional:       Comments: Awake, alert and oriented person, place, time, patient does appear uncomfortable as well as anxious however no acute distress, nontoxic in appearance   HENT:      Head: Normocephalic and atraumatic. Mouth/Throat:      Mouth: Mucous membranes are dry. Pharynx: Oropharynx is clear. Eyes:      Extraocular Movements: Extraocular movements intact. Pupils: Pupils are equal, round, and reactive to light. Cardiovascular:      Rate and Rhythm: Regular rhythm. Tachycardia present. Pulses: Normal pulses. Heart sounds: No murmur heard. No gallop. Pulmonary:      Effort: Pulmonary effort is normal. No respiratory distress. Breath sounds: Normal breath sounds. No wheezing. Abdominal:      General: Abdomen is flat. Palpations: Abdomen is soft. Tenderness: There is no abdominal tenderness. There is no guarding or rebound. Musculoskeletal:      Right lower leg: No edema. Left lower leg: No edema. Skin:     General: Skin is dry. Capillary Refill: Capillary refill takes less than 2 seconds. Comments: Patient does have beefy red rash present inferiorly to her pannus that extends into her bilateral inguinal region the does appear consistent with fungal infection   Neurological:      General: No focal deficit present. Mental Status: She is oriented to person, place, and time. Motor: No weakness.          DIFFERENTIAL  DIAGNOSIS     PLAN (LABS / IMAGING / EKG):  Orders Placed This Encounter   Procedures    COVID-19, Rapid    XR CHEST PORTABLE    CBC with Auto Differential    Comprehensive Metabolic Panel w/ Reflex to MG    Lipase    Urinalysis with Microscopic    Beta-Hydroxybutyrate    Troponin    Ethanol    Inpatient consult to Internal Medicine    POC Glucose Fingerstick    POC Glucose Fingerstick    EKG 12 Lead    Place in Observation Service    Suicide precautions       MEDICATIONS ORDERED:  Orders Placed This Encounter   Medications    LORazepam (ATIVAN) tablet 1 mg    ondansetron (ZOFRAN-ODT) disintegrating tablet 4 mg    0.9 % sodium chloride bolus    miconazole (MICOTIN) 2 % powder    insulin lispro (HUMALOG) injection vial 10 Units    LORazepam (ATIVAN) tablet 2 mg       DDX: Alcohol intoxication, alcohol withdrawal, dehydration, hyperglycemia, DKA, HHS, electrolyte abnormality    Initial MDM/Plan: 42 y.o. female who presents with concerns for alcohol intoxication and possible suicidal ideation per EMS. Seen and examined, she does appear to be uncomfortable as well as anxious however nontoxic in appearance. Speaking in full sentences, vital signs remarkable for tachycardia in the 120s however normotensive, afebrile and saturating well on room air. Skin exam is remarkable for beefy red rash underneath the patient's pannus does appear consistent with fungal infection and does extend into her inguinal region will give fungal powder. Patient does have mild tremors bilateral upper extremities but otherwise physical exam is unremarkable. Given significant elevated blood sugar and tachycardia will give fluids, obtain ketones and BMP to screen for DKA. We will also obtain abdominal laboratory work. Given mild bilateral upper extremity tremors, tachycardia as well as anxious appearance will give dose of Ativan.     DIAGNOSTIC RESULTS / EMERGENCY DEPARTMENT COURSE / MDM     LABS:  Labs Reviewed   CBC WITH AUTO DIFFERENTIAL - Abnormal; Notable for the following components:       Result Value    Hematocrit 46.4 (*)     Seg Neutrophils 69 (*)     All other components within normal limits   COMPREHENSIVE METABOLIC PANEL W/ REFLEX TO MG FOR LOW K - Abnormal; Notable for the following components:    Glucose 509 (*)     BUN 4 (*)     Calcium 8.3 (*)     Potassium 3.6 (*)     Chloride 95 (*)     Anion Gap 22 (*)     Alkaline Phosphatase 573 (*)      (*)      (*)     Albumin 3.0 (*)     All other components within normal limits   TROPONIN - Abnormal; Notable for the following components:    Troponin, High Sensitivity 29 (*)     All other components within normal limits   ETHANOL - Abnormal; Notable for the following components:    Ethanol 356 (*)     All other components within normal limits   POC GLUCOSE FINGERSTICK - Abnormal; Notable for the following components:    POC Glucose 463 (*)     All other components within normal limits   POC GLUCOSE FINGERSTICK - Abnormal; Notable for the following components:    POC Glucose 326 (*)     All other components within normal limits   COVID-19, RAPID   LIPASE   BETA-HYDROXYBUTYRATE   URINALYSIS WITH MICROSCOPIC         RADIOLOGY:  XR CHEST PORTABLE    Result Date: 6/9/2022  EXAMINATION: ONE XRAY VIEW OF THE CHEST 6/9/2022 7:37 pm COMPARISON: March 18, 2022. HISTORY: ORDERING SYSTEM PROVIDED HISTORY: tachy TECHNOLOGIST PROVIDED HISTORY: tachy Reason for Exam: tachy, Alcohol intoxication FINDINGS: A portable upright frontal view chest radiograph was obtained. The heart is top-normal in size. The mediastinal contour and pleural spaces are otherwise within normal limits. The lungs are grossly clear. There is no focal consolidation or pneumothorax. The pulmonary vascular pattern is within normal limits. No acute thoracic osseous abnormality. Clear lungs. No acute cardiopulmonary abnormality. EKG  EKG Interpretation    Interpreted by me    Rhythm: normal sinus   Rate: normal  Axis: normal  Ectopy: none  Conduction: normal  ST Segments: no acute change  T Waves: Inversions V1, V2  Q Waves: none    Clinical Impression: Normal sinus rhythm, normal axis, normal intervals, no ST segment changes, T wave inversions V1 and V2     All EKG's are interpreted by the Emergency Department Physician who either signs or Co-signs this chart in the absence of a cardiologist.    EMERGENCY DEPARTMENT COURSE:  ED Course as of 06/10/22 0047   Fri Bg 10, 2022   0021 New ultrasound guided IV placed in the right AC, 20-gauge. [AP]      ED Course User Index  [AP] Dax Christianson DO     Troponin obtained and is at patient's baseline.   Patient is hyperglycemic however bicarb within normal limits, no ketones on laboratory work ruling out DKA. She was given 10 units of insulin subcu as well as 1 L IV fluids. Patient did receive 1 milligram of Ativan p.o. with improvement in heart rate to mid 100s. Unable to initially obtain IV access, ultrasound IV was attempted unsuccessfully by myself. Attending physician did place ultrasound IV and patient was given 1 L fluid bolus. Was informed by RN that IV appeared to had infiltrated and patient received 200 cc of fluid subcutaneously, patient was examined and no significant swelling was appreciated. Blood glucose was rechecked after receiving 10 units of insulin and did improve to 326. Ethanol resulted and was elevated at 356, given elevation over 300 medicine was paged for admission of the patient. Patient reassessed and again tachycardic in the 120s, given 2 mg p.o. Ativan. Although patient is tachycardic she is not hypoxic, no respiratory distress and reports she is compliant with her anticoagulation medications at home so unlikely recurrent pulmonary embolism at this time. Tachycardia most likely combination of dehydration given significant alcohol consumption today as well as possible early withdrawal.  Patient again asked about suicidal statements reported to EMS and denied suicidal ideations at this time. Medicine did accept admission of the patient, given history of psychiatric illness as well as suicide attempt patient would benefit from psychiatric consultation on an inpatient basis. PROCEDURES:  None    CONSULTS:  IP CONSULT TO INTERNAL MEDICINE  IP CONSULT TO SOCIAL WORK  IP CONSULT TO PSYCHIATRY    CRITICAL CARE:  Please see attending note    FINAL IMPRESSION      1. Acute alcoholic intoxication without complication ([facility] [address]          DISPOSITION / PLAN     DISPOSITION Admitted 06/10/2022 12:07:12 AM        PATIENTREFERRED TO:  No follow-up provider specified.     DISCHARGE MEDICATIONS:  New Prescriptions    No medications on file       Fátima Gonzalez Lottie Al,   EmergencyMedicine Resident    (Please note that portions of this note were completed with a voice recognition program.  Efforts were made to edit the dictations but occasionally words are mis-transcribed.)        Macario Qureshi DO  Resident  06/10/22 [id number]

## 2022-06-10 NOTE — ED NOTES
Cleaned pts wound on pannis applied fungal powder, zinc cream and covered in abdominal pads per Dr. Marylin Mcdaniels request.     PEBBLES Alonso  06/09/22 [id number]

## 2022-06-10 NOTE — PROGRESS NOTES
Pt arrived to floor via stretcher from ED and was transfered to bed. Vitals taken. Admission and assessment complete. No distress noted. See doc flowsheet and admission navigator for details. POC and education initiated and reviewed with patient. Call light within reach, and pt educated on its use. Bed in lowest position, and locked. Side rails up x 2. Denied further questions or needs at this time. Will continue to monitor. 1:1 sitter at bedside for suicide precautions. Patient denying any suicidal ideation, she states it was her mother that said that to EMS.

## 2022-06-10 NOTE — PLAN OF CARE
Problem: Discharge Planning  Goal: Discharge to home or other facility with appropriate resources  6/10/2022 1712 by Zachery Pathak RN  Outcome: Progressing     Problem: Pain  Goal: Verbalizes/displays adequate comfort level or baseline comfort level  6/10/2022 1712 by Zachery Pathak RN  Outcome: Progressing     Problem: Safety - Adult  Goal: Free from fall injury  6/10/2022 1712 by Zachery Pathak RN  Outcome: Progressing    Problem: ABCDS Injury Assessment  Goal: Absence of physical injury  6/10/2022 1712 by Zachery Pathak RN  Outcome: Progressing     Problem: Self Harm/Suicidality  Goal: Will have no self-injury during hospital stay  Description: INTERVENTIONS:  1. Q 30 MINUTES: Routine safety checks  2. Q SHIFT & PRN: Assess risk to determine if routine checks are adequate to maintain patient safety  6/10/2022 1712 by Zachery Pathak RN  Outcome: Progressing

## 2022-06-10 NOTE — H&P
[address] [facility]     HISTORY AND PHYSICAL EXAMINATION            Date:   6/10/2022  Patientname:  Conner King  Date of admission:  6/9/2022  7:50 PM  MRN:   [id number]  Account:  [de-identified]  YOB: 1980  PCP:    Charles Bowden  Room:   06/06  Code Status:    Prior    CHIEF COMPLAINT     Chief Complaint   Patient presents with    Alcohol Intoxication       HISTORY OF PRESENT ILLNESS  (Character, Onset, Location, Duration,  Exacerbating/RelievingFactors, Radiation,   Associated Symptoms, Severity )      The patient is a 42 y.o. female, with a history of alcohol abuse, bipolar disorder, previous suicide attempt and ideation with behaviors and drug overdose, PE, DVT, SVT, diabetes  who presents via EMS with the complaint of alcohol intoxication. Per the documentation family called EMS because she reportedly drank \"20 fireball shooters\" and had intermittent comments about suicide attempts. Upon arrival to the emergency room she denied suicidal ideation or attempts and was stating she was anxious and tremulous requesting Ativan to \"help her with her alcohol withdrawal\"           Work up in the emergency room       Laboratories:   Metabolic panel: Sodium 590, potassium 3.6, chloride 95, CO2 20, BUN 4, creatinine 0.72. Glucose 509  Ethanol 356/ .0356  Cardiac Markers: High sensitive troponin 29  Liver profile: Albumin 3.0, alk phos 573, , , lipase 29, bilirubin 0.69  Hematology: No acute leukocytosis with a WBC of 7.7, hemoglobin 15.3, hematocrit 46.4  Coagulation:  Urine-cloudy, large glucose, blocking pulm gravity 1.039, nitrite negative. Small leuk esterase, WBC 50 200,000, hemoglobin positive epithelial cells 3-5      Imaging and Diagnostics:   EKG (as documented in ED note):          XR CHEST PORTABLE    Clear lungs. No acute cardiopulmonary abnormality.        PAST MEDICAL HISTORY   Patient  has a past medical history of Alcohol abuse, Anxiety, Arthritis, Painting esophagus, Benzodiazepine overdose, Bipolar disorder, unspecified ([address] Bipolar I disorder, most recent episode depressed, severe without psychotic features ([address] Cellulitis, Chronic abdominal wound infection, Constipation, Depression, Drug overdose, intentional ([address] Genital herpes, unspecified, GERD (gastroesophageal reflux disease), History of pulmonary embolism, Hx of blood clots, Hypertension, Iron deficiency anemia, Isopropyl alcohol poisoning, Lumbosacral spondylosis without myelopathy, MDRO (multiple drug resistant organisms) resistance, Miscarriage, Morbid obesity ([address] MRSA (methicillin resistant staph aureus) culture positive, Overdose of benzodiazepine, Pernicious anemia, Primary hypercoagulable state ([address] Pulmonary embolism ([address] Suicidal behavior, Suicidal ideation, Suicide attempt ([address] SVT (supraventricular tachycardia) ([address] Toxic effect of ethanol, intentional self-harm ([address] and Type 2 diabetes mellitus, with long-term current use of insulin ([address] PAST SURGICAL HISTORY    Patient  has a past surgical history that includes Cholecystectomy; Liver Resection; hernia repair; Tonsillectomy; Endoscopy, colon, diagnostic; Abdominal hernia repair (2014); Abdominal hernia repair (11/3/14); Bariatric Surgery (2013); Dilation and curettage of uterus (2005); Finger amputation (Left, 02/09/2015); lymph node biopsy (1990); Total abdominal hysterectomy w/ bilateral salpingoophorectomy (2011); and IVC filter insertion. FAMILY HISTORY    Patient family history includes Breast Cancer in her maternal aunt and maternal cousin; Cancer in her maternal uncle; Depression in her mother; Diabetes in her father; Heart Disease in her father; High Blood Pressure in her father and mother; High Cholesterol in her father and mother; Kidney Disease in her father. SOCIAL HISTORY    Patient  reports that she has never smoked.  She has never used smokeless tobacco. She reports that she does not drink alcohol and does not use drugs. HOME MEDICATIONS        Prior to Admission medications    Medication Sig Start Date End Date Taking? Authorizing Provider   dilTIAZem (CARDIZEM CD) 360 MG extended release capsule Take 360 mg by mouth daily    Historical Provider, MD   Insulin Glargine, 2 Unit Dial, (TOUJEO MAX SOLOSTAR) 300 UNIT/ML SOPN Inject 50 Units into the skin daily    Historical Provider, MD   cyclobenzaprine (FLEXERIL) 10 mg tablet Take 10 mg by mouth 3 times daily as needed for Muscle spasms    Historical Provider, MD   insulin aspart (NOVOLOG FLEXPEN) 100 UNIT/ML injection pen Inject 5-10 Units into the skin 3 times daily (before meals) Per carb ratio    Historical Provider, MD   losartan (COZAAR) 50 mg tablet Take 50 mg by mouth daily    Historical Provider, MD   HYDROcodone-acetaminophen (NORCO) 5-325 MG per tablet Take 1 tablet by mouth every 6 hours as needed for Pain.  Indications: Last filled 5/24/22 for 30 days    Historical Provider, MD   traZODone (DESYREL) 100 MG tablet Take 100 mg by mouth nightly as needed for Sleep    Historical Provider, MD   FLUoxetine (PROZAC) 20 MG capsule Take 2 capsules by mouth daily 3/19/22   Deepti Daley MD   famotidine (PEPCID) 20 MG tablet TAKE 1 TABLET BY MOUTH TWICE DAILY 4/22/21   Belvin Cabot, MD   fondaparinux (ARIXTRA) 10 MG/0.8ML SOLN injection Inject 0.8 mLs into the skin daily 3/30/21   Belvin Cabot, MD   cyanocobalamin 1000 MCG/ML injection Inject 1 mL into the muscle every 7 days 2/13/21   Belvin Cabot, MD   Syringe/Needle, Disp, (SYRINGE 3CC/25GX1\") 25G X 1\" 3 ML MISC To be used with B12 injections monthly 2/13/21   Belvin Cabot, MD   FARXIGA 10 MG tablet TAKE 1 TABLET BY MOUTH EVERY MORNING 11/2/20   Belvin Cabot, MD   pravastatin (PRAVACHOL) 40 MG tablet TAKE 1 TABLET(40 MG) BY MOUTH DAILY 6/26/20   Belvin Cabot, MD   valACYclovir (VALTREX) 500 MG tablet Take 1 tablet by mouth daily 6/8/20   Sandy MD Pebbles   acetaminophen (TYLENOL) 500 MG tablet Take 2 tablets by mouth every 8 hours as needed for Pain 2/19/20   Ayana Allan,    GLUCAGEN HYPOKIT 1 MG SOLR injection  12/19/19   Historical Provider, MD   glucose monitoring kit (FREESTYLE) monitoring kit Testing twice a day. Please dispense according to patients insurance. 12/20/19   Nely Francois MD   Insulin Pen Needle 31G X 5 MM MISC 1 each by Does not apply route daily 12/20/19   Nely Francois MD   Lancets 30G MISC Testing twice a day. Please dispense according to patients insurance. 12/20/19   Nely Francois MD   blood glucose monitor strips Testing twice a day. Please dispense according to patients insurance. 12/20/19   Nely Francois MD   Multiple Vitamins-Minerals (THERAPEUTIC MULTIVITAMIN-MINERALS) tablet Take 1 tablet by mouth daily    Historical Provider, MD   vitamin D (CHOLECALCIFEROL) 1000 UNIT TABS tablet Take 1,000 Units by mouth daily     Historical Provider, MD       Murtis Deidre [aspirin], Betadine [povidone iodine], Celexa [citalopram hydrobromide], Citalopram, Lasix [furosemide], Macrobid [nitrofurantoin], Norco [hydrocodone-acetaminophen], Betadine [povidone iodine], Codeine, Lithium, Paroxetine, Paxil [paroxetine hcl], Tape [adhesive tape], and Tegretol [carbamazepine]    REVIEW OF SYSTEMS     Review of Systems   Constitutional: Negative for chills, diaphoresis and fever. HENT: Negative for congestion and hearing loss. Eyes: Negative for visual disturbance. Respiratory: Negative for cough, shortness of breath, wheezing and stridor. Cardiovascular: Negative for chest pain, palpitations and leg swelling. Gastrointestinal: Positive for nausea. Negative for abdominal pain, blood in stool, constipation, diarrhea and vomiting. Genitourinary: Negative for dysuria and frequency. Musculoskeletal: Negative for myalgias. Skin: Negative for rash. Neurological: Positive for tremors.  Negative for dizziness, seizures and headaches. Psychiatric/Behavioral: Positive for agitation. Negative for hallucinations, self-injury and suicidal ideas. The patient is nervous/anxious. PHYSICAL EXAM      BP (!) 138/95   Pulse (!) 123   Temp 97.8 °F (36.6 °C) (Axillary)   Resp 20   Ht 5' 9\" (1.753 m)   Wt 280 lb (127 kg)   SpO2 99%   BMI 41.35 kg/m²  Body mass index is 41.35 kg/m². Physical Exam  Vitals and nursing note reviewed. Constitutional:       General: She is not in acute distress. Appearance: She is well-developed. She is not diaphoretic. HENT:      Head: Normocephalic and atraumatic. Right Ear: Hearing normal.      Left Ear: Hearing normal.      Nose: Nose normal. No rhinorrhea. Mouth/Throat:      Dentition: Abnormal dentition (no teeth, dentures at home ). No gum lesions. Eyes:      General: Lids are normal.      Extraocular Movements:      Right eye: Normal extraocular motion. Left eye: Normal extraocular motion. Conjunctiva/sclera: Conjunctivae normal.      Right eye: Right conjunctiva is not injected. Left eye: Left conjunctiva is not injected. Pupils: Pupils are equal, round, and reactive to light. Pupils are equal.      Right eye: Pupil is reactive. Left eye: Pupil is reactive. Neck:      Thyroid: No thyromegaly. Vascular: No carotid bruit. Trachea: Trachea and phonation normal. No tracheal deviation. Cardiovascular:      Rate and Rhythm: Regular rhythm. Tachycardia present. Pulses: Normal pulses. Heart sounds: Normal heart sounds. No murmur heard. Pulmonary:      Effort: Pulmonary effort is normal. No respiratory distress. Breath sounds: Normal breath sounds. No stridor. No decreased breath sounds. Abdominal:      General: Bowel sounds are normal. There is no distension. Palpations: Abdomen is soft. There is no mass. Tenderness: There is no abdominal tenderness. There is no guarding. Musculoskeletal:         General: No tenderness. Cervical back: Neck supple. Skin:     General: Skin is warm and dry. Findings: No erythema, lesion or rash. Neurological:      Mental Status: She is alert and oriented to person, place, and time. She is not disoriented. GCS: GCS eye subscore is 4. GCS verbal subscore is 5. GCS motor subscore is 6. Cranial Nerves: No cranial nerve deficit. Sensory: No sensory deficit. Motor: Tremor (patient forces tremor to the point you can see the elbow moving. tremor will stop when she is distracted or as if you are ignoring the tremor ) present. Psychiatric:         Speech: Speech normal.         Behavior: Behavior normal. Behavior is cooperative. DIAGNOSTICS          Labs:  CBC:   Recent Labs     06/09/22 2005   WBC 7.7   HGB 15.3        BMP:    Recent Labs     06/09/22 2005      K 3.6*   CL 95*   CO2 20   BUN 4*   CREATININE 0.72   GLUCOSE 509*     S. Calcium:  Recent Labs     06/09/22 2005   CALCIUM 8.3*     S. Ionized Calcium:No results for input(s): IONCA in the last 72 hours. S. Magnesium:No results for input(s): MG in the last 72 hours. S. Phosphorus:No results for input(s): PHOS in the last 72 hours. S. Glucose:  Recent Labs     06/09/22 2007 06/09/22  2342 06/10/22  0111   POCGLU 463* 326* 271*     Glycosylated hemoglobin A1C:   Lab Results   Component Value Date    LABA1C 7.8 05/04/2021     Hepatic:   Recent Labs     06/09/22 2005   *   *   ALKPHOS 573*     CARDIAC ENZY:   Recent Labs     06/09/22 2005   TROPHS 29*     INR: No results for input(s): INR in the last 72 hours. BNP: No results for input(s): PROBNP in the last 72 hours. ABGs: No results for input(s): PH, PCO2, PO2, HCO3, O2SAT in the last 72 hours. Lipids: No results for input(s): CHOL, TRIG, HDL, LDL, LDLCALC in the last 72 hours. Pancreatic functions:  Recent Labs     06/09/22 2005   LIPASE 29     S.  LacticAcid: No results for input(s): LACTA in the last 72 hours. Thyroid functions:   Lab Results   Component Value Date    TSH 1.71 03/14/2022      U/A:  Recent Labs     06/10/22  0052   COLORU Yellow   WBCUA 51    RBCUA 3 to 5   SPECGRAV 1.033*   LEUKOCYTESUR SMALL*   GLUCOSEU LARGE*     Recent Labs     06/10/22  0033   COVID19 Not Detected     Imaging/Diagonstics:     XR CHEST PORTABLE    Result Date: 6/9/2022  EXAMINATION: ONE XRAY VIEW OF THE CHEST 6/9/2022 7:37 pm COMPARISON: March 18, 2022. HISTORY: ORDERING SYSTEM PROVIDED HISTORY: tachy TECHNOLOGIST PROVIDED HISTORY: tachy Reason for Exam: tachy, Alcohol intoxication FINDINGS: A portable upright frontal view chest radiograph was obtained. The heart is top-normal in size. The mediastinal contour and pleural spaces are otherwise within normal limits. The lungs are grossly clear. There is no focal consolidation or pneumothorax. The pulmonary vascular pattern is within normal limits. No acute thoracic osseous abnormality. Clear lungs. No acute cardiopulmonary abnormality. ASSESSMENT  and  PLAN     Principal Problem:    Alcoholic intoxication without complication (HCC)  Active Problems:    Transaminasemia    Type 2 diabetes mellitus with diabetic polyneuropathy, with long-term current use of insulin (HCC)    Bipolar affective disorder in remission ([facility]    History of pulmonary embolism    Antiphospholipid syndrome (HCC)    Severe benzodiazepine use disorder ([facility]    Acute cystitis with hematuria  Resolved Problems:    * No resolved hospital problems. *        Plan:    Acute alcohol intoxication  -IV NS @ 125 ml/hr  -Multivitamin, thiamine and folic acid daily  -Sitter at bedside  -Seizure precautions  -Daily labs; check Mg, Phos, TSH, HgbA1c  -Inpatient psych/Social service consult for help with substance abuse  -Will resume Norco and muscle relaxers after intoxication resolved  -Avoid IV Ativan as she has previously overdosed on benzo.  She is specifically asking for ativan, and her last drink prior to today was \" 3-4 weeks ago\" I explained treatment of her anxiety will be with medications other than ativan . -She forces the tremor, ciwa is only a 2 given her nausea. Suicidal comments-   -Subjective report, patient denies but with her history will ask psych to clear  -Monitor labs   -Psych consult  -Sitter at bedside  -Suicide precautions  -Search patient and remove belongings from room  -History of suicidal attempt and ideations  -History of drug overdose  -History of bipolar disorder    Transaminasemia  -Recheck levels in am  -Last ,  ( 6/22)-  On 4/22 normal levels    -hx alcoholic hepatitis  - further work up based on follow up labs     Tachycardia  -hx of svt  -Tx with IVF bolus   -Resume Cardizem  -Check TSH     Hypercoagulable state/ hx DVT/PE  -Continue arixtra as home medication   -verbalizes compliance      Diabetes Mellitus with hyperglycemia  -Mild Elevation in anion gap   -Beta hydroxybutyrate 0.21  -Hold oral hypoglycemics/metformin for now  -long acting insulin 50 units daily  -POCT ac and hs with sliding scale coverage    Urinary tract infection  - Rocephin Antibiotic  -Urine culture pending                                IVF: NS  125cc/hr   Diet: regular ADA  GI ppx: pepcid- home med  DVT ppx: Arixtra - home rx   Medication Reconciliation: done  Code status: full  Upcoming Procedure:none  Dispo: admit/observation     Consultations:     IP CONSULT TO INTERNAL MEDICINE  IP CONSULT TO SOCIAL WORK  IP CONSULT TO PSYCHIATRY    I have discussed the care of Lisa Crenshaw ,   including pertinent history and exam findings,      5/5/22   with the Sherri Gonzalez, JAREN, AGACNP, FNP-BC   I have seen and examined the patient and the key elements of all parts of the encounter have been performed by me . I agree with the assessment, plan and orders as documented by the resident.      Hospital Problems           Last Modified POA    * (Principal) Alcoholic intoxication without complication ([facility] 5/91/3372 Yes    Transaminasemia 6/10/2022 Yes    Type 2 diabetes mellitus with diabetic polyneuropathy, with long-term current use of insulin ([facility] 6/10/2022 Yes    Essential hypertension (Chronic) 6/10/2022 Yes    Bipolar affective disorder in remission ([facility] 6/10/2022 Yes    History of pulmonary embolism 6/10/2022 Yes    Antiphospholipid syndrome ([facility] 6/10/2022 Yes    Alcohol intoxication ([facility] 6/10/2022 Yes    Severe benzodiazepine use disorder ([facility] 6/10/2022 Yes    Acute cystitis with hematuria 6/10/2022 Yes    History of pulmonary embolus (PE) 6/10/2022 Yes            --         Medications: Allergies:     Allergies   Allergen Reactions    Asa [Aspirin]      Patient is on chronic anticoagulation    Betadine [Povidone Iodine]     Celexa [Citalopram Hydrobromide]     Citalopram     Lasix [Furosemide]      Tolerates Bumex    Macrobid [Nitrofurantoin]     Norco [Hydrocodone-Acetaminophen] Hives    Betadine [Povidone Iodine] Rash    Codeine Rash    Lithium Nausea And Vomiting    Paroxetine Rash    Paxil [Paroxetine Hcl] Rash    Tape Elige Lathe Tape] Rash     Paper tape    Tegretol [Carbamazepine] Rash       Current Meds:   Scheduled Meds:    sodium chloride flush  5-40 mL IntraVENous 2 times per day    insulin lispro  0-12 Units SubCUTAneous TID WC    insulin lispro  0-6 Units SubCUTAneous Nightly    dilTIAZem  360 mg Oral Daily    famotidine  20 mg Oral BID    FLUoxetine  40 mg Oral Daily    fondaparinux  10 mg SubCUTAneous Daily    insulin glargine  50 Units SubCUTAneous Daily    losartan  50 mg Oral Daily    [Held by provider] pravastatin  40 mg Oral Nightly    Vitamin D  1,000 Units Oral Daily    sodium chloride flush  5-40 mL IntraVENous 2 times per day    thiamine  100 mg Oral Daily    folic acid  1 mg Oral Daily    ciprofloxacin  500 mg Oral Daily    therapeutic multivitamin-minerals  1 tablet Oral Daily    phosphorus  250 mg Oral BID    magnesium oxide  400 mg Oral BID    miconazole   Topical BID     Continuous Infusions:    sodium chloride 125 mL/hr at 06/10/22 0155    sodium chloride      dextrose      sodium chloride       PRN Meds: sodium chloride flush, sodium chloride, ondansetron **OR** ondansetron, polyethylene glycol, glucose, dextrose bolus **OR** dextrose bolus, glucagon (rDNA), dextrose, traZODone, sodium chloride flush, sodium chloride, hydrOXYzine HCl            Yordan Rodney DNP, AGACARLP, FNP-BC   6/10/2022  2:00 Christopher Gill [address] [place] [address].    Phone [phone]

## 2022-06-10 NOTE — PLAN OF CARE
Problem: Discharge Planning  Goal: Discharge to home or other facility with appropriate resources  Outcome: Progressing     Problem: Pain  Goal: Verbalizes/displays adequate comfort level or baseline comfort level  Outcome: Progressing     Problem: Safety - Adult  Goal: Free from fall injury  Outcome: Progressing     Problem: ABCDS Injury Assessment  Goal: Absence of physical injury  Outcome: Progressing     Problem: Self Harm/Suicidality  Goal: Will have no self-injury during hospital stay  Outcome: Progressing

## 2022-06-10 NOTE — PROGRESS NOTES
Dr. Trell Farias notified of pt's HR being in 120' to 150 and as well as having PVC's. RN stated PO Cardizem was given and K was replaced with 40 meq. Perfect serve message read at 0810.  No new orders noted

## 2022-06-11 PROBLEM — F10.220 ACUTE ALCOHOL INTOXICATION WITH ALCOHOLISM, UNCOMPLICATED (HCC): Status: ACTIVE | Noted: 2022-06-11

## 2022-06-11 LAB
ALBUMIN SERPL-MCNC: 2.3 G/DL (ref 3.5–5.2)
ALP BLD-CCNC: 386 U/L (ref 35–104)
ALT SERPL-CCNC: 225 U/L (ref 5–33)
ANION GAP SERPL CALCULATED.3IONS-SCNC: 9 MMOL/L (ref 9–17)
AST SERPL-CCNC: 219 U/L
BILIRUB SERPL-MCNC: 0.98 MG/DL (ref 0.3–1.2)
BUN BLDV-MCNC: 6 MG/DL (ref 6–20)
CALCIUM IONIZED: 1.17 MMOL/L (ref 1.1–1.33)
CALCIUM SERPL-MCNC: 8.2 MG/DL (ref 8.6–10.4)
CHLORIDE BLD-SCNC: 102 MMOL/L (ref 98–107)
CO2: 29 MMOL/L (ref 20–31)
CREAT SERPL-MCNC: 0.66 MG/DL (ref 0.5–0.9)
GFR AFRICAN AMERICAN: >60 ML/MIN
GFR NON-AFRICAN AMERICAN: >60 ML/MIN
GFR SERPL CREATININE-BSD FRML MDRD: ABNORMAL ML/MIN/{1.73_M2}
GLUCOSE BLD-MCNC: 148 MG/DL (ref 65–105)
GLUCOSE BLD-MCNC: 182 MG/DL (ref 65–105)
GLUCOSE BLD-MCNC: 87 MG/DL (ref 65–105)
GLUCOSE BLD-MCNC: 95 MG/DL (ref 65–105)
GLUCOSE BLD-MCNC: 98 MG/DL (ref 70–99)
INR BLD: 1
MAGNESIUM: 1.8 MG/DL (ref 1.6–2.6)
POTASSIUM SERPL-SCNC: 3.2 MMOL/L (ref 3.7–5.3)
PROTHROMBIN TIME: 13.6 SEC (ref 11.8–14.6)
SODIUM BLD-SCNC: 140 MMOL/L (ref 135–144)
TOTAL PROTEIN: 5.2 G/DL (ref 6.4–8.3)

## 2022-06-11 PROCEDURE — 6370000000 HC RX 637 (ALT 250 FOR IP): Performed by: INTERNAL MEDICINE

## 2022-06-11 PROCEDURE — 2580000003 HC RX 258: Performed by: STUDENT IN AN ORGANIZED HEALTH CARE EDUCATION/TRAINING PROGRAM

## 2022-06-11 PROCEDURE — 6360000002 HC RX W HCPCS: Performed by: STUDENT IN AN ORGANIZED HEALTH CARE EDUCATION/TRAINING PROGRAM

## 2022-06-11 PROCEDURE — 2060000000 HC ICU INTERMEDIATE R&B

## 2022-06-11 PROCEDURE — 82330 ASSAY OF CALCIUM: CPT

## 2022-06-11 PROCEDURE — 99222 1ST HOSP IP/OBS MODERATE 55: CPT | Performed by: PSYCHIATRY & NEUROLOGY

## 2022-06-11 PROCEDURE — 6370000000 HC RX 637 (ALT 250 FOR IP): Performed by: STUDENT IN AN ORGANIZED HEALTH CARE EDUCATION/TRAINING PROGRAM

## 2022-06-11 PROCEDURE — 6370000000 HC RX 637 (ALT 250 FOR IP): Performed by: NURSE PRACTITIONER

## 2022-06-11 PROCEDURE — 80053 COMPREHEN METABOLIC PANEL: CPT

## 2022-06-11 PROCEDURE — 85610 PROTHROMBIN TIME: CPT

## 2022-06-11 PROCEDURE — 2580000003 HC RX 258: Performed by: NURSE PRACTITIONER

## 2022-06-11 PROCEDURE — 6360000002 HC RX W HCPCS: Performed by: INTERNAL MEDICINE

## 2022-06-11 PROCEDURE — 82947 ASSAY GLUCOSE BLOOD QUANT: CPT

## 2022-06-11 PROCEDURE — 99233 SBSQ HOSP IP/OBS HIGH 50: CPT | Performed by: INTERNAL MEDICINE

## 2022-06-11 PROCEDURE — 6360000002 HC RX W HCPCS: Performed by: NURSE PRACTITIONER

## 2022-06-11 PROCEDURE — 96376 TX/PRO/DX INJ SAME DRUG ADON: CPT

## 2022-06-11 PROCEDURE — 36415 COLL VENOUS BLD VENIPUNCTURE: CPT

## 2022-06-11 PROCEDURE — 83735 ASSAY OF MAGNESIUM: CPT

## 2022-06-11 RX ORDER — METOPROLOL SUCCINATE 25 MG/1
25 TABLET, EXTENDED RELEASE ORAL DAILY
Status: DISCONTINUED | OUTPATIENT
Start: 2022-06-11 | End: 2022-06-12 | Stop reason: HOSPADM

## 2022-06-11 RX ORDER — MAGNESIUM SULFATE 1 G/100ML
1000 INJECTION INTRAVENOUS ONCE
Status: COMPLETED | OUTPATIENT
Start: 2022-06-11 | End: 2022-06-11

## 2022-06-11 RX ORDER — CIPROFLOXACIN 250 MG/1
250 TABLET, FILM COATED ORAL EVERY 12 HOURS SCHEDULED
Status: DISCONTINUED | OUTPATIENT
Start: 2022-06-11 | End: 2022-06-12 | Stop reason: HOSPADM

## 2022-06-11 RX ADMIN — SODIUM CHLORIDE, PRESERVATIVE FREE 10 ML: 5 INJECTION INTRAVENOUS at 21:09

## 2022-06-11 RX ADMIN — SODIUM CHLORIDE, PRESERVATIVE FREE 10 ML: 5 INJECTION INTRAVENOUS at 08:11

## 2022-06-11 RX ADMIN — SODIUM CHLORIDE, PRESERVATIVE FREE 10 ML: 5 INJECTION INTRAVENOUS at 08:09

## 2022-06-11 RX ADMIN — ANTI-FUNGAL POWDER MICONAZOLE NITRATE TALC FREE: 1.42 POWDER TOPICAL at 08:11

## 2022-06-11 RX ADMIN — ONDANSETRON 4 MG: 2 INJECTION INTRAMUSCULAR; INTRAVENOUS at 07:29

## 2022-06-11 RX ADMIN — FAMOTIDINE 20 MG: 20 TABLET, FILM COATED ORAL at 21:11

## 2022-06-11 RX ADMIN — INSULIN LISPRO 2 UNITS: 100 INJECTION, SOLUTION INTRAVENOUS; SUBCUTANEOUS at 12:09

## 2022-06-11 RX ADMIN — FOLIC ACID 1 MG: 1 TABLET ORAL at 08:07

## 2022-06-11 RX ADMIN — POTASSIUM BICARBONATE 40 MEQ: 782 TABLET, EFFERVESCENT ORAL at 11:02

## 2022-06-11 RX ADMIN — LORAZEPAM 2 MG: 2 INJECTION INTRAMUSCULAR; INTRAVENOUS at 13:44

## 2022-06-11 RX ADMIN — CIPROFLOXACIN 250 MG: 250 TABLET, FILM COATED ORAL at 21:13

## 2022-06-11 RX ADMIN — Medication 1000 UNITS: at 08:07

## 2022-06-11 RX ADMIN — INSULIN GLARGINE 50 UNITS: 100 INJECTION, SOLUTION SUBCUTANEOUS at 08:12

## 2022-06-11 RX ADMIN — FONDAPARINUX SODIUM 10 MG: 10 INJECTION, SOLUTION SUBCUTANEOUS at 08:09

## 2022-06-11 RX ADMIN — LORAZEPAM 2 MG: 2 INJECTION INTRAMUSCULAR; INTRAVENOUS at 21:11

## 2022-06-11 RX ADMIN — ANTI-FUNGAL POWDER MICONAZOLE NITRATE TALC FREE: 1.42 POWDER TOPICAL at 21:10

## 2022-06-11 RX ADMIN — POTASSIUM BICARBONATE 40 MEQ: 782 TABLET, EFFERVESCENT ORAL at 21:11

## 2022-06-11 RX ADMIN — LORAZEPAM 2 MG: 2 INJECTION INTRAMUSCULAR; INTRAVENOUS at 07:29

## 2022-06-11 RX ADMIN — LOSARTAN POTASSIUM 50 MG: 50 TABLET, FILM COATED ORAL at 08:08

## 2022-06-11 RX ADMIN — FLUOXETINE HYDROCHLORIDE 40 MG: 20 CAPSULE ORAL at 08:07

## 2022-06-11 RX ADMIN — ONDANSETRON 4 MG: 2 INJECTION INTRAMUSCULAR; INTRAVENOUS at 21:11

## 2022-06-11 RX ADMIN — DILTIAZEM HYDROCHLORIDE 360 MG: 180 CAPSULE, COATED, EXTENDED RELEASE ORAL at 08:08

## 2022-06-11 RX ADMIN — MAGNESIUM SULFATE HEPTAHYDRATE 1000 MG: 1 INJECTION, SOLUTION INTRAVENOUS at 11:01

## 2022-06-11 RX ADMIN — SODIUM CHLORIDE: 9 INJECTION, SOLUTION INTRAVENOUS at 10:59

## 2022-06-11 RX ADMIN — METOPROLOL SUCCINATE 25 MG: 25 TABLET, EXTENDED RELEASE ORAL at 15:12

## 2022-06-11 RX ADMIN — FAMOTIDINE 20 MG: 20 TABLET, FILM COATED ORAL at 08:08

## 2022-06-11 RX ADMIN — SODIUM CHLORIDE, PRESERVATIVE FREE 10 ML: 5 INJECTION INTRAVENOUS at 13:44

## 2022-06-11 RX ADMIN — THIAMINE HCL TAB 100 MG 100 MG: 100 TAB at 08:07

## 2022-06-11 RX ADMIN — ONDANSETRON 4 MG: 2 INJECTION INTRAMUSCULAR; INTRAVENOUS at 13:44

## 2022-06-11 RX ADMIN — MULTIPLE VITAMINS W/ MINERALS TAB 1 TABLET: TAB at 08:11

## 2022-06-11 RX ADMIN — INSULIN LISPRO 1 UNITS: 100 INJECTION, SOLUTION INTRAVENOUS; SUBCUTANEOUS at 21:20

## 2022-06-11 ASSESSMENT — PAIN SCALES - GENERAL
PAINLEVEL_OUTOF10: 0

## 2022-06-11 NOTE — PROGRESS NOTES
Pt continues with tachycardia up to 160's while up out of bed. Strip posted. No acute distress noted. Pt did take po cardizem this am as ordered. Resident paged to inform of episode.

## 2022-06-11 NOTE — PLAN OF CARE
Problem: Self Harm/Suicidality  Goal: Will have no self-injury during hospital stay  Description: INTERVENTIONS:  1. Q 30 MINUTES: Routine safety checks  2. Q SHIFT & PRN: Assess risk to determine if routine checks are adequate to maintain patient safety  6/11/2022 0305 by Rosales Nair RN  Outcome: Progressing  6/10/2022 1712 by Richar Badillo RN  Outcome: Progressing

## 2022-06-11 NOTE — CARE COORDINATION
ONGOING DISCHARGE PLAN:    Patient is alert and oriented x4. Spoke with patient regarding discharge plan and patient confirms that plan is still home without any needs. DME: Glucometer/supplies Denies needs for VNS. [facility]. For Alcohol consumption - pt is a binge drineker. Elevated LFT. K+ 3.2    ORANGE HEADER NA    Will continue to follow for additional discharge needs.     Electronically signed by Nilda Dumont RN on 6/11/2022 at 10:46 AM

## 2022-06-11 NOTE — PROGRESS NOTES
Put in second psych consult. [facility] charge called at [phone], said that someone would be here today to do psych consult.

## 2022-06-11 NOTE — CARE COORDINATION
Department of Psychiatry   Psychiatric Assessment  Care Coordination Note    This note is for care coordination only. It is not to be used for billing. Thank you very much for allowing us to participate in the care of this patient. Reason for Consult:  Suicidal comments          HISTORY OF PRESENT ILLNESS:      The patient is a 42 y.o. female with significant history of hypertension, alcohol abuse, mental illness, type 2 diabetes mellitus, pulmonary embolism, antiphospholipid syndrome, DVT, Painting's esophagus, GERD, MRSA, and acute pancreatitis who is admitted medically for alcohol intoxication. While intoxicated patient did make \"intermittent\" suicidal statements. It does appear patient was in the ER a couple days previous to this with similar symptoms. At that ER admission while intoxicated patient stated that she was suicidal with a plan to drink herself to death. Per documentation patient was to be evaluated upon sobering up however no other documentation was written at that encounter. Patient is seen at bedside today accompanied by 1:1 sitter. Patient is agreeable to assessment. Patient reports that this is the 20th anniversary of her child passing away in utero and it has been very hard for her. Per previous documentation patient has an Antiphospholipid antibody syndrome and she has lost 5 children in utero due to this. Patient turned to alcohol to cope with her feelings. Patient has a significant history of alcohol use and has been drinking since the age of 27. Prior to admission patient reports drinking about \"20 fireball shooters\" to cope with her depression. Per documentation it appears that patient made suicidal statements to EMS when they picked her up. Patient reports that she has had depression on and off for many years. She states her depression is \"fleeting\" and denies that it is all day, nearly everyday. She does endorse poor sleep and states she has \"insomnia. \"  She states that she often has trouble falling asleep and staying asleep throughout the night. She denies anhedonia stating she has a best friend and daughter that she is able to find fernando in and with. She states that often her energy is \"excessive\" and states sometimes she feels as if she has too much energy. She states her appetite is \"hit or miss. \"  She denies feelings of hopelessness and helplessness. She currently denies suicidal ideation and states, \"I have no reason or intent to want to end my life. \" She denies homicidal ideation. Patient does endorse periods of time in her past where she has gone \"4 days\" without sleep and felt increased energy. She describes her energy as \"excessive\" and states that she often has racing thoughts that keep her awake. She states that during this time she experiences irritability, distractibility, and impulsive risk decision making. She states people have commented that she speaks really fast during these times. Patient does report she has a history of auditory and visual hallucinations however states, \"it only happens when I haven't slept and I haven't experienced it lately. \"  She denies paranoia. Patient endorses excess worry. She states she worries too much and often feels restless and on edge. She states that she gets muscle tension from being keyed up often. She endorses a history of panic attacks and states that they are usually brought on when she is in large crowds. She states, \"going to the mall with my daughter is like torture to me. \"  She states that when she has a panic attack she gets \"sweaty and my heart rate goes through the roof. \"  She denies obsessive-compulsive thoughts or behaviors. Patient does endorse a history of trauma from losing 5 children in utero due to a medical condition. She does report that she often has nightmares however denies vivid flashbacks.  She has some symptoms consistent with Cluster B Personality Disorder including history of self-harm, poor self-esteem at times, and mood swings with intense anger outbursts. She denies feeling chronically empty and states sometimes her self-esteem is \"too good. \"      Patient has a significant history of alcohol abuse. She reports that she has been drinking since the age of 27. She does report some periods of sobriety and stated there was a time where he was \"7 years\" sober however this timeline does not add up if patient started drinking at 27 and now she is 42. She states that she attends [employer] meetings and that she has a sponsor however her sponsor is currently in [place]. She denies that she drinks everyday however when she does drink she drinks to get drunk. Prior to admission she reportedly took \"20 fireball shooters. \"  BAL upon admission was 0.356. On 6/2/2022 patient presented to hospital with similar symptoms and at that admission BAL was 0.265. Patient denies other illicit drug use. UDS is negative. There is some concern that patient may be minimizing of her symptoms. Per nursing documentation from 6/10/22, patient's mother called very concerned. Per nursing documentation patient's mother states that patient makes comments about wanting to die often but then denies it to healthcare personnel so she can be discharged home. History of head trauma: [x] Yes [] No  Patient reports she has a TBI from falling off a horse. History of seizures: [] Yes [x] No    History of violence or aggression: [] Yes [x] No         PSYCHIATRIC HISTORY:      · Outpatient psychiatric provider:  [facility]  · Suicide attempts: Denies previous  · Inpatient psychiatric admissions: Some previous \"many years ago. \"  ER documentation states that patient was admitted to [facility] in March of 2022 however this information is false and patient has no recent inpatient hospitalizations at [facility].      Past psychiatric medications includes:   \"I have been on so many\"  Zoloft, Prozac, Risperdal, Zyprexa, Vraylar, Lithium, Tegretol, Geodon, Depakote, Wellbutrin, Lamictal, Clonidine    Patient reports she has been on Wellbutrin and Lamictal for 10+ years. Adverse reactions from psychotropic medications:  Patient states she has side effects to   Lithium  Tegretol  Geodon  Depakote   However does not state why she is allergic but states, \"I have lots of psychotropic sensitivity. \"         Lifetime Psychiatric Review of Systems         Depression: Endorses     Anxiety: Endorses     Panic Attacks: Endorses     Michelle or Hypomania: Endorses     Phobias: Endorses     Obsessions and Compulsions: Denies     Visual Hallucinations: Denies     Auditory Hallucinations: Denies     Delusions/Paranoia: Denies     PTSD: Endorses     Trauma:  Endorses         Prior to Admission medications    Medication Sig Start Date End Date Taking? Authorizing Provider   buPROPion (WELLBUTRIN XL) 300 MG extended release tablet Take 300 mg by mouth every morning   Yes Historical Provider, MD   lamoTRIgine (LAMICTAL) 200 MG tablet Take 200 mg by mouth daily   Yes Historical Provider, MD   dilTIAZem (CARDIZEM CD) 360 MG extended release capsule Take 360 mg by mouth daily    Historical Provider, MD   Insulin Glargine, 2 Unit Dial, (TOUJEO MAX SOLOSTAR) 300 UNIT/ML SOPN Inject 50 Units into the skin daily    Historical Provider, MD   cyclobenzaprine (FLEXERIL) 10 mg tablet Take 10 mg by mouth 3 times daily as needed for Muscle spasms    Historical Provider, MD   insulin aspart (NOVOLOG FLEXPEN) 100 UNIT/ML injection pen Inject 5-10 Units into the skin 3 times daily (before meals) Per carb ratio    Historical Provider, MD   losartan (COZAAR) 50 mg tablet Take 50 mg by mouth daily    Historical Provider, MD   HYDROcodone-acetaminophen (NORCO) 5-325 MG per tablet Take 1 tablet by mouth every 6 hours as needed for Pain.  Indications: Last filled 5/24/22 for 30 days    Historical Provider, MD   traZODone (DESYREL) 100 MG tablet Take 100 mg by mouth nightly as needed for Sleep  Patient not taking: Reported on 6/10/2022    Historical Provider, MD   famotidine (PEPCID) 20 MG tablet TAKE 1 TABLET BY MOUTH TWICE DAILY 4/22/21   Gabriel Espinoza MD   fondaparinux (ARIXTRA) 10 MG/0.8ML SOLN injection Inject 0.8 mLs into the skin daily 3/30/21   Gabriel Espinoza MD   cyanocobalamin 1000 MCG/ML injection Inject 1 mL into the muscle every 7 days 2/13/21   Gabriel Espinoza MD   Syringe/Needle, Disp, (SYRINGE 3CC/25GX1\") 25G X 1\" 3 ML MISC To be used with B12 injections monthly 2/13/21   Gabriel Espinoza MD   FARXIGA 10 MG tablet TAKE 1 TABLET BY MOUTH EVERY MORNING 11/2/20   Gabriel Espinoza MD   pravastatin (PRAVACHOL) 40 MG tablet TAKE 1 TABLET(40 MG) BY MOUTH DAILY 6/26/20   Gabriel Espinoza MD   valACYclovir (VALTREX) 500 MG tablet Take 1 tablet by mouth daily 6/8/20   Gabriel Espinoza MD   acetaminophen (TYLENOL) 500 MG tablet Take 2 tablets by mouth every 8 hours as needed for Pain 2/19/20   Ayana Davila, DO   GLUCAGEN HYPOKIT 1 MG SOLR injection  12/19/19   Historical Provider, MD   glucose monitoring kit (FREESTYLE) monitoring kit Testing twice a day. Please dispense according to patients insurance. 12/20/19   Gabriel Espinoza MD   Insulin Pen Needle 31G X 5 MM MISC 1 each by Does not apply route daily 12/20/19   Gabriel Espinoza MD   Lancets 30G MISC Testing twice a day. Please dispense according to patients insurance. 12/20/19   Gabriel Espinoza MD   blood glucose monitor strips Testing twice a day. Please dispense according to patients insurance.  12/20/19   Gabriel Espinoza MD   Multiple Vitamins-Minerals (THERAPEUTIC MULTIVITAMIN-MINERALS) tablet Take 1 tablet by mouth daily    Historical Provider, MD   vitamin D (CHOLECALCIFEROL) 1000 UNIT TABS tablet Take 1,000 Units by mouth daily     Historical Provider, MD        Medications:    Current Facility-Administered Medications: potassium bicarb-citric acid (EFFER-K) effervescent tablet 40 mEq, 40 mEq, Oral, BID  0.9 % sodium chloride infusion, , IntraVENous, Continuous  sodium chloride flush 0.9 % injection 5-40 mL, 5-40 mL, IntraVENous, 2 times per day  sodium chloride flush 0.9 % injection 10 mL, 10 mL, IntraVENous, PRN  0.9 % sodium chloride infusion, , IntraVENous, PRN  ondansetron (ZOFRAN-ODT) disintegrating tablet 4 mg, 4 mg, Oral, Q8H PRN **OR** ondansetron (ZOFRAN) injection 4 mg, 4 mg, IntraVENous, Q6H PRN  polyethylene glycol (GLYCOLAX) packet 17 g, 17 g, Oral, Daily PRN  insulin lispro (HUMALOG) injection vial 0-12 Units, 0-12 Units, SubCUTAneous, TID WC  insulin lispro (HUMALOG) injection vial 0-6 Units, 0-6 Units, SubCUTAneous, Nightly  glucose chewable tablet 16 g, 4 tablet, Oral, PRN  dextrose bolus 10% 125 mL, 125 mL, IntraVENous, PRN **OR** dextrose bolus 10% 250 mL, 250 mL, IntraVENous, PRN  glucagon (rDNA) injection 1 mg, 1 mg, IntraMUSCular, PRN  dextrose 5 % solution, 100 mL/hr, IntraVENous, PRN  dilTIAZem (CARDIZEM CD) extended release capsule 360 mg, 360 mg, Oral, Daily  famotidine (PEPCID) tablet 20 mg, 20 mg, Oral, BID  FLUoxetine (PROZAC) capsule 40 mg, 40 mg, Oral, Daily  fondaparinux (ARIXTRA) injection 10 mg, 10 mg, SubCUTAneous, Daily  insulin glargine (LANTUS) injection vial 50 Units, 50 Units, SubCUTAneous, Daily  losartan (COZAAR) tablet 50 mg, 50 mg, Oral, Daily  [Held by provider] pravastatin (PRAVACHOL) tablet 40 mg, 40 mg, Oral, Nightly  traZODone (DESYREL) tablet 100 mg, 100 mg, Oral, Nightly PRN  vitamin D (CHOLECALCIFEROL) tablet 1,000 Units, 1,000 Units, Oral, Daily  sodium chloride flush 0.9 % injection 5-40 mL, 5-40 mL, IntraVENous, 2 times per day  sodium chloride flush 0.9 % injection 5-40 mL, 5-40 mL, IntraVENous, PRN  0.9 % sodium chloride infusion, , IntraVENous, PRN  thiamine mononitrate tablet 100 mg, 100 mg, Oral, Daily  folic acid (FOLVITE) tablet 1 mg, 1 mg, Oral, Daily  ciprofloxacin (CIPRO) tablet 500 mg, 500 mg, Oral, Daily  hydrOXYzine HCl (ATARAX) tablet 10 mg, 10 mg, Oral, TID PRN  therapeutic multivitamin-minerals 1 tablet, 1 tablet, Oral, Daily  LORazepam (ATIVAN) injection 2 mg, 2 mg, IntraVENous, Q6H PRN  miconazole (MICOTIN) 2 % powder, , Topical, BID     Past Medical History:        Diagnosis Date    Alcohol abuse     sober [id number]    Anxiety     Arthritis     Painting esophagus     Benzodiazepine overdose 9/26/2015    Bipolar disorder, unspecified ([address]     Bipolar I disorder, most recent episode depressed, severe without psychotic features ([address]     Cellulitis 11/17/2018    Chronic abdominal wound infection 9/27/2015    Constipation 9/3/2019    Depression 7/12/2015    Drug overdose, intentional ([address] 7/12/2015    Genital herpes, unspecified     GERD (gastroesophageal reflux disease)     History of pulmonary embolism     Hx of blood clots dx 2 years ago    clots in both legs and lungs     Hypertension     Iron deficiency anemia     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    Lumbosacral spondylosis without myelopathy     MDRO (multiple drug resistant organisms) resistance 2010    MRSA (abd)    Miscarriage     multiple, around 7th mos pregnant each time d/t hypercoagulation    Morbid obesity ([address]     MRSA (methicillin resistant staph aureus) culture positive 02/10/2017    urine    Overdose of benzodiazepine     Pernicious anemia     Primary hypercoagulable state ([address]     antiphospholipid antibodies on Arixtra shots daily    Pulmonary embolism ([address] 2010    Suicidal behavior 12/14/2015    Suicidal ideation     Suicide attempt ([address] 2014    hx OD on painkillers and rubbing alcohol    SVT (supraventricular tachycardia) ([address] 04/07/2017    Toxic effect of ethanol, intentional self-harm ([address] 12/16/2015    Type 2 diabetes mellitus, with long-term current use of insulin ([address]        Past Surgical History:        Procedure Laterality Date    ABDOMINAL HERNIA REPAIR  2014    wound vac    ABDOMINAL HERNIA REPAIR  11/3/14    BARIATRIC SURGERY  2013    [address]    CHOLECYSTECTOMY      DILATION AND CURETTAGE OF UTERUS  2005    ENDOSCOPY, COLON, DIAGNOSTIC      EGD    FINGER AMPUTATION Left 02/09/2015    index and ring finger. from frostbite    HERNIA REPAIR      total of 8    IVC FILTER INSERTION      LIVER RESECTION      for hepatic adenoma    LYMPH NODE BIOPSY  1990    JUSTINE AND BSO (CERVIX REMOVED)  2011    TONSILLECTOMY         Allergies: Asa [aspirin], Betadine [povidone iodine], Celexa [citalopram hydrobromide], Citalopram, Lasix [furosemide], Macrobid [nitrofurantoin], Norco [hydrocodone-acetaminophen], Betadine [povidone iodine], Codeine, Lithium, Paroxetine, Paxil [paroxetine hcl], Tape [adhesive tape], and Tegretol [carbamazepine]           Social History:      Born in[place]  Family: Reports being raised by her mother and father. Reports father passed away in 2019. Reports that relationship with mother is like \"oil and water\" however reports mother is her biggest support. Reports that she has a twin brother. Highest Level of Education: 4 years of college  Occupation: Unemployed, receives SSDI  Marital Status: Currently  for 25 years   Children: One living daughter, 5 miscarriages  Residence: Lives at home with  and daughter who is currently home from college  Stressors: Memories of losing children and anniversary of death, alcohol abuse  Patient Assets/Supportive Factors: Patient has supportive family, stable housing and stable income         SUBSTANCE USE HISTORY:   Patient has a significant history of alcohol abuse. She reports that she has been drinking since the age of 27. She does report some periods of sobriety and stated there was a time where he was \"7 years\" sober however this timeline does not add up if patient started drinking at 27 and now she is 42. She states that she attends AA meetings and that she has a sponsor however her sponsor is currently in [place].   She denies that she drinks everyday however when she does drink she drinks to get drunk. Prior to admission she reportedly took \"20 fireball shooters. \"  BAL upon admission was 0.356. On 6/2/2022 patient presented to hospital with similar symptoms and at that admission BAL was 0.265. Patient denies other illicit drug use. UDS is negative. LEGAL HISTORY:   HISTORY OF INCARCERATION: [] Yes [x] No         Family Medical and Psychiatric History:     Patient endorses psychiatric family history. Suicides in family: [] Yes [x] No    Substance use in family: [] Yes [x] No        Problem Relation Age of Onset    Depression Mother     High Blood Pressure Mother     High Cholesterol Mother     Diabetes Father     Heart Disease Father     Kidney Disease Father     High Blood Pressure Father     High Cholesterol Father     Cancer Maternal Uncle         of duodenum had whipple    Breast Cancer Maternal Aunt     Breast Cancer Maternal Cousin             Physical  BP (!) 144/92   Pulse (!) 104   Temp 97.4 °F (36.3 °C) (Oral)   Resp 18   Ht 5' 9\" (1.753 m)   Wt 280 lb (127 kg)   SpO2 100%   BMI 41.35 kg/m²     LABS:  Labs reviewed: [x] Yes  Last EKG in EMR reviewed: [x] Yes  QTc: 470         Mental Status Examination:      Level of consciousness: Awake and alert  Appearance: Hospital attire attire, resting in bed, fair grooming   Behavior/Motor: Approachable, withdrawn  Attitude toward examiner:  Cooperative, attentive, good eye contact  Speech: Normal rate, volume, and tone. Mood: Depressed  Affect: Mood-congruent  Thought processes: Linear and coherent  Thought content: Denies suicidal ideations, without current plan or intent, contracts for safety on the unit. Denies homicidal ideations               Denies visual hallucinations. Denies auditory hallucinations.               Denies delusions              Denies paranoia  Cognition:  Oriented to self, location, time, situation  Concentration: Clinically adequate  Memory: Intact  Insight &Judgment: Fair         DSM-5 DIAGNOSIS:      Bipolar II Disorder, most recent episode depressed  Alcohol Use Disorder    Stressors     Severity of stressors is moderate. Source of stressors include: Other psychosocial and environmental stressors    PLAN:      At this time patient does not meet criteria for inpatient hospitalization  Additional recommendations will follow the clinical course. Thank you very much for allowing us to participate in the care of this patient. Time spent 60 min.       Electronically signed by BERNARDA Shirley CNP on 6/11/22 at 12:36 PM EDT

## 2022-06-11 NOTE — CONSULTS
Department of Psychiatry   Psychiatric Assessment      Thank you very much for allowing us to participate in the care of this patient. Reason for Consult: Suicidal statements    HISTORY OF PRESENT ILLNESS:          The patient is a 42 y.o. female with significant history of type 2 diabetes mellitus, history of pulmonary embolism, antiphospholipid syndrome, hypertension, alcohol abuse, GERD, MRSA, pancreatitis, history of Painting's esophagus who is admitted medically secondary to intoxication with alcohol. Reportedly while patient was intoxicated she made some suicidal related statements. Reportedly patient making statements that she would want to drink herself to death. Patient is now sober. She is adamantly denying any suicidal or homicidal ideation intent or plan. She does not think she has a drinking problem. She states it was the anniversary of a late term miscarriage approximately 20 years ago. States she was 8 months pregnant and lost her baby. She states she chose bad coping mechanisms and interning to alcohol wanted approximately 20 shots. Patient does have a history of depression. Reports difficulty with sleep but denies any anhedonia. No consistent problems with her appetite is associated with her mood. At present time she is denying feelings of hopelessness or helplessness. He has a history of major depressive episodes. Patient also endorses periods which may be consistent with hypomanic episodes. Periods lasting up to 4 days with very little or no sleep, increased energy, increase in goal-directed activity, racing thoughts, irritability and distractibility and impulsive risky behaviors. Denying any symptoms currently.   Denying any psychotic symptoms    PSYCHIATRIC HISTORY:      · Out[facility] psychiatric [facility]: [facility]  · Suicide attempts: Denies  · Inpatient psychiatric admissions: Remote    Past psychiatric medications includes:     Patient reports she has been on over a dozen medications including multiple antidepressants and mood stabilizers  Adverse reactions from psychotropic medications:    Patient states she has side effects to   Lithium  Tegretol  Geodon  Depakote   However does not state why she is allergic but states, \"I have lots of psychotropic sensitivity. \"     [facility] Psychiatric Review of Systems          Depression: Endorses     Anxiety: Endorses     Panic Attacks: Endorses     Michelle or Hypomania: Endorses     Phobias: Endorses     Obsessions and Compulsions: Denies     Visual Hallucinations: Denies     Auditory Hallucinations: Denies     Delusions/Paranoia: Denies     PTSD: Endorses     Trauma:  Endorses    Prior to Admission medications    Medication Sig Start Date End Date Taking? Authorizing Provider   buPROPion (WELLBUTRIN XL) 300 MG extended release tablet Take 300 mg by mouth every morning   Yes Historical Provider, MD   lamoTRIgine (LAMICTAL) 200 MG tablet Take 200 mg by mouth daily   Yes Historical Provider, MD   dilTIAZem (CARDIZEM CD) 360 MG extended release capsule Take 360 mg by mouth daily    Historical Provider, MD   Insulin Glargine, 2 Unit Dial, (TOUJEO MAX SOLOSTAR) 300 UNIT/ML SOPN Inject 50 Units into the skin daily    Historical Provider, MD   cyclobenzaprine (FLEXERIL) 10 mg tablet Take 10 mg by mouth 3 times daily as needed for Muscle spasms    Historical Provider, MD   insulin aspart (NOVOLOG FLEXPEN) 100 UNIT/ML injection pen Inject 5-10 Units into the skin 3 times daily (before meals) Per carb ratio    Historical Provider, MD   losartan (COZAAR) 50 mg tablet Take 50 mg by mouth daily    Historical Provider, MD   HYDROcodone-acetaminophen (NORCO) 5-325 MG per tablet Take 1 tablet by mouth every 6 hours as needed for Pain.  Indications: Last filled 5/24/22 for 30 days    Historical Provider, MD   traZODone (DESYREL) 100 MG tablet Take 100 mg by mouth nightly as needed for Sleep  Patient not taking: Reported on 6/10/2022 Historical Provider, MD   famotidine (PEPCID) 20 MG tablet TAKE 1 TABLET BY MOUTH TWICE DAILY 4/22/21   Abena Price MD   fondaparinux (ARIXTRA) 10 MG/0.8ML SOLN injection Inject 0.8 mLs into the skin daily 3/30/21   Abena Price MD   cyanocobalamin 1000 MCG/ML injection Inject 1 mL into the muscle every 7 days 2/13/21   Abena Price MD   Syringe/Needle, Disp, (SYRINGE 3CC/25GX1\") 25G X 1\" 3 ML MISC To be used with B12 injections monthly 2/13/21   Abena Price MD   FARXIGA 10 MG tablet TAKE 1 TABLET BY MOUTH EVERY MORNING 11/2/20   Abena Price MD   pravastatin (PRAVACHOL) 40 MG tablet TAKE 1 TABLET(40 MG) BY MOUTH DAILY 6/26/20   Abena Price MD   valACYclovir (VALTREX) 500 MG tablet Take 1 tablet by mouth daily 6/8/20   Abena Price MD   acetaminophen (TYLENOL) 500 MG tablet Take 2 tablets by mouth every 8 hours as needed for Pain 2/19/20   Ayana Keller DO   GLUCAGEN HYPOKIT 1 MG SOLR injection  12/19/19   Historical Provider, MD   glucose monitoring kit (FREESTYLE) monitoring kit Testing twice a day. Please dispense according to patients insurance. 12/20/19   Abena Price MD   Insulin Pen Needle 31G X 5 MM MISC 1 each by Does not apply route daily 12/20/19   Abena Price MD   Lancets 30G MISC Testing twice a day. Please dispense according to patients insurance. 12/20/19   Abena Price MD   blood glucose monitor strips Testing twice a day. Please dispense according to patients insurance.  12/20/19   Abena Price MD   Multiple Vitamins-Minerals (THERAPEUTIC MULTIVITAMIN-MINERALS) tablet Take 1 tablet by mouth daily    Historical Provider, MD   vitamin D (CHOLECALCIFEROL) 1000 UNIT TABS tablet Take 1,000 Units by mouth daily     Historical Provider, MD        Medications:    Current Facility-Administered Medications: potassium bicarb-citric acid (EFFER-K) effervescent tablet 40 mEq, 40 mEq, Oral, BID  metoprolol succinate (TOPROL XL) extended release tablet 25 mg, 25 mg, Oral, Daily  ciprofloxacin (CIPRO) tablet 250 mg, 250 mg, Oral, 2 times per day  0.9 % sodium chloride infusion, , IntraVENous, Continuous  sodium chloride flush 0.9 % injection 5-40 mL, 5-40 mL, IntraVENous, 2 times per day  sodium chloride flush 0.9 % injection 10 mL, 10 mL, IntraVENous, PRN  0.9 % sodium chloride infusion, , IntraVENous, PRN  ondansetron (ZOFRAN-ODT) disintegrating tablet 4 mg, 4 mg, Oral, Q8H PRN **OR** ondansetron (ZOFRAN) injection 4 mg, 4 mg, IntraVENous, Q6H PRN  polyethylene glycol (GLYCOLAX) packet 17 g, 17 g, Oral, Daily PRN  insulin lispro (HUMALOG) injection vial 0-12 Units, 0-12 Units, SubCUTAneous, TID WC  insulin lispro (HUMALOG) injection vial 0-6 Units, 0-6 Units, SubCUTAneous, Nightly  glucose chewable tablet 16 g, 4 tablet, Oral, PRN  dextrose bolus 10% 125 mL, 125 mL, IntraVENous, PRN **OR** dextrose bolus 10% 250 mL, 250 mL, IntraVENous, PRN  glucagon (rDNA) injection 1 mg, 1 mg, IntraMUSCular, PRN  dextrose 5 % solution, 100 mL/hr, IntraVENous, PRN  dilTIAZem (CARDIZEM CD) extended release capsule 360 mg, 360 mg, Oral, Daily  famotidine (PEPCID) tablet 20 mg, 20 mg, Oral, BID  FLUoxetine (PROZAC) capsule 40 mg, 40 mg, Oral, Daily  fondaparinux (ARIXTRA) injection 10 mg, 10 mg, SubCUTAneous, Daily  insulin glargine (LANTUS) injection vial 50 Units, 50 Units, SubCUTAneous, Daily  losartan (COZAAR) tablet 50 mg, 50 mg, Oral, Daily  [Held by provider] pravastatin (PRAVACHOL) tablet 40 mg, 40 mg, Oral, Nightly  traZODone (DESYREL) tablet 100 mg, 100 mg, Oral, Nightly PRN  vitamin D (CHOLECALCIFEROL) tablet 1,000 Units, 1,000 Units, Oral, Daily  sodium chloride flush 0.9 % injection 5-40 mL, 5-40 mL, IntraVENous, 2 times per day  sodium chloride flush 0.9 % injection 5-40 mL, 5-40 mL, IntraVENous, PRN  0.9 % sodium chloride infusion, , IntraVENous, PRN  thiamine mononitrate tablet 100 mg, 100 mg, Oral, Daily  folic acid (FOLVITE) tablet 1 mg, 1 vac    ABDOMINAL HERNIA REPAIR  11/3/14    BARIATRIC SURGERY  2013    [address]    CHOLECYSTECTOMY      DILATION AND CURETTAGE OF UTERUS  2005    ENDOSCOPY, COLON, DIAGNOSTIC      EGD    FINGER AMPUTATION Left 02/09/2015    index and ring finger. from frostbite    HERNIA REPAIR      total of 8    IVC FILTER INSERTION      LIVER RESECTION      for hepatic adenoma    LYMPH NODE BIOPSY  1990    JUSTINE AND BSO (CERVIX REMOVED)  2011    TONSILLECTOMY         Allergies: Asa [aspirin], Betadine [povidone iodine], Celexa [citalopram hydrobromide], Citalopram, Lasix [furosemide], Macrobid [nitrofurantoin], Norco [hydrocodone-acetaminophen], Betadine [povidone iodine], Codeine, Lithium, Paroxetine, Paxil [paroxetine hcl], Tape [adhesive tape], and Tegretol [carbamazepine]      Social History:         Social History:       Born in: [place]  Family: Reports being raised by her mother and father. Reports father passed away in 2019. Reports that relationship with mother is like \"oil and water\" however reports mother is her biggest support. Reports that she has a twin brother. Highest Level of Education: 4 years of college  Occupation: Unemployed, receives SSDI  Marital Status: Currently  for 25 years   Children: One living daughter, 5 miscarriages  Residence: Lives at home with  and daughter who is currently home from college  Stressors: Memories of losing children and anniversary of death, alcohol abuse  Patient Assets/Supportive Factors: Patient has supportive family, stable housing and stable income    SUBSTANCE USE HISTORY:   Patient has a significant history of alcohol abuse. She reports that she has been drinking since the age of 27. She does report some periods of sobriety and stated there was a time where he was \"7 years\" sober however this timeline does not add up if patient started drinking at 27 and now she is 42.  She states that she attends AA meetings and that she has a sponsor however her sponsor is currently in [place]. She denies that she drinks everyday however when she does drink she drinks to get drunk. Prior to admission she reportedly took \"20 fireball shooters. \"  BAL upon admission was 0.356. On 6/2/2022 patient presented to hospital with similar symptoms and at that admission BAL was 0.265. Patient denies other illicit drug use. UDS is negative. Family Medical and Psychiatric History:     Is any family histories of suicide attempts or completions        Problem Relation Age of Onset    Depression Mother     High Blood Pressure Mother     High Cholesterol Mother     Diabetes Father     Heart Disease Father     Kidney Disease Father     High Blood Pressure Father     High Cholesterol Father     Cancer Maternal Uncle         of duodenum had whipple    Breast Cancer Maternal Aunt     Breast Cancer Maternal Cousin          Physical  BP (!) 145/79   Pulse 93   Temp 98.3 °F (36.8 °C) (Oral)   Resp 16   Ht 5' 9\" (1.753 m)   Wt 280 lb (127 kg)   SpO2 100%   BMI 41.35 kg/m²         Mental Status Examination:  Level of consciousness:  Within normal limits  Appearance: hospital attire, lying in bed, fair grooming  Behavior/Motor:  no abnormalities noted  Attitude toward examiner:  cooperative, attentive and good eye contact  Speech:  Spontaneous, normal rate and volume  Mood: Depressed  Affect: mood congruent  Thought processes:  Linear, goal directed, coherent  Thought content: Denies suicidal ideations   Denies homicidal ideations    Denies hallucinations   Denies delusions  Cognition:  Oriented to self, situation, location, date  Concentration clinically adequate  Memory age appropriate  Insight & Judgment:  fair    DSM-5 DIAGNOSIS:      Bipolar depression, most recent episode depressed    Stressors     Severity of stressors is largely limited to anniversary of the loss of her baby  Source of stressors include:   Other psychosocial and environmental stressors    PLAN:      Patient can continue current medication for now. She does not meet criteria for involuntary hospitalization and the patient does not want hospitalization. We will sign off for now, please let us know if there is any other questions or concerns      Thank you very much for allowing us to participate in the care of this patient.        Electronically signed by Fabienne Cano MD on 6/11/22 at 7:55 PM EDT      t

## 2022-06-11 NOTE — PROGRESS NOTES
[facility] Internal Medicine    Progress Note     6/11/2022    10:40 AM    Name:   Barnie Denver  MRN:     [id number]     Acct:      [de-identified]   Room:   2111/211101   Day:  0  Admit Date:  6/9/2022  7:50 PM    PCP:   Galina RODRIGUEZ  Code Status:  Full Code    Subjective:     C/C:   Chief Complaint   Patient presents with    Alcohol Intoxication     Principal Problem:    Acute alcoholic intoxication without complication ([facility]  Active Problems:    Transaminasemia    Tachycardia    Type 2 diabetes mellitus with diabetic polyneuropathy, with long-term current use of insulin ([facility]    Depression    Essential hypertension    Bipolar affective disorder in remission ([facility]    History of pulmonary embolism    Antiphospholipid syndrome ([place] [address].)    Alcohol intoxication ([facility]    Severe benzodiazepine use disorder ([facility]    Acute cystitis with hematuria    History of pulmonary embolus (PE)  Resolved Problems:    * No resolved hospital problems. *    No acute episodes overnight. Patient is heme stable with Tmax of 97.9  Patient appears to be in a better mood today. She is more vocal and \"flat affect\" is no longer present. No signs of withdrawal. Flapping tremor not present with hands extended. Vitals reviewed. Telemetry reviewed. Remains in sinus tachycardia due to anxiety. Ativan 2 mg q 6 PRN order in place. Most recently given at 7 am. Home medications restarted - patient did receive her home Cardizem this morning. Labs reviewed. Potassium and Magnesium supplemented. Adequate urine output - unmeasured urine output occurrence x 1    Patient has midline in place due to poor access. No signs of infection.       Significant last 24 hr data reviewed ;   Vitals:    06/10/22 2346 06/11/22 0045 06/11/22 0359 06/11/22 0700   BP:  (!) 151/95  (!) 144/92   Pulse:  (!) 110 96 (!) 104   Resp: 16 16 18 18   Temp:  97.9 °F (36.6 °C)  97.4 °F (36.3 °C)   TempSrc:  Oral  Oral   SpO2: 94% 97% 94% 100% Weight:       Height:          Recent Results (from the past 24 hour(s))   POC Glucose Fingerstick    Collection Time: 06/10/22 11:15 AM   Result Value Ref Range    POC Glucose 123 (H) 65 - 105 mg/dL   POC Glucose Fingerstick    Collection Time: 06/10/22  4:26 PM   Result Value Ref Range    POC Glucose 177 (H) 65 - 105 mg/dL   POC Glucose Fingerstick    Collection Time: 06/10/22  8:32 PM   Result Value Ref Range    POC Glucose 161 (H) 65 - 105 mg/dL   Comprehensive Metabolic Panel w/ Reflex to MG    Collection Time: 06/11/22  4:54 AM   Result Value Ref Range    Glucose 98 70 - 99 mg/dL    BUN 6 6 - 20 mg/dL    CREATININE 0.66 0.50 - 0.90 mg/dL    Calcium 8.2 (L) 8.6 - 10.4 mg/dL    Sodium 140 135 - 144 mmol/L    Potassium 3.2 (L) 3.7 - 5.3 mmol/L    Chloride 102 98 - 107 mmol/L    CO2 29 20 - 31 mmol/L    Anion Gap 9 9 - 17 mmol/L    Alkaline Phosphatase 386 (H) 35 - 104 U/L     (H) 5 - 33 U/L     (H) <32 U/L    Total Bilirubin 0.98 0.3 - 1.2 mg/dL    Total Protein 5.2 (L) 6.4 - 8.3 g/dL    Albumin 2.3 (L) 3.5 - 5.2 g/dL    GFR Non-African American >60 >60 mL/min    GFR African American >60 >60 mL/min    GFR Comment         Magnesium    Collection Time: 06/11/22  4:54 AM   Result Value Ref Range    Magnesium 1.8 1.6 - 2.6 mg/dL   Calcium, Ionized    Collection Time: 06/11/22  4:54 AM   Result Value Ref Range    Calcium, Ion 1.17 1.10 - 1.33 mmol/L   Protime-INR    Collection Time: 06/11/22  4:54 AM   Result Value Ref Range    Protime 13.6 11.8 - 14.6 sec    INR 1.0    POC Glucose Fingerstick    Collection Time: 06/11/22  6:36 AM   Result Value Ref Range    POC Glucose 95 65 - 105 mg/dL     Recent Labs     06/10/22  0811 06/10/22  1115 06/10/22  1626 06/10/22  2032 06/11/22  0636   POCGLU 289* 123* 177* 161* 95        IR FLUORO GUIDED NEEDLE PLACEMENT    Result Date: 6/10/2022  EXAMINATION: LIMITED ULTRASOUND OF THE ARM FOR MIDLINE/PICC ACCESS, 6/10/2022 TECHNIQUE: The PICC team used ultrasound guidance to place a PICC line. HISTORY: ORDERING SYSTEM PROVIDED HISTORY: midline TECHNOLOGIST PROVIDED HISTORY: midline Is the patient pregnant?->No FLUOROSCOPY DOSE AND TYPE OR TIME AND EXPOSURES: None FINDINGS: Ultrasound images demonstrate patency of a brachial vein which was used for placement of a midline by the PICC team without a radiologist present. By report, a 5.5 [place]raini x  15 cm dual-lumen midline was placed. Successful placement of midline. XR CHEST PORTABLE    Result Date: 6/9/2022  EXAMINATION: ONE XRAY VIEW OF THE CHEST 6/9/2022 7:37 pm COMPARISON: March 18, 2022. HISTORY: ORDERING SYSTEM PROVIDED HISTORY: tachy TECHNOLOGIST PROVIDED HISTORY: tachy Reason for Exam: tachy, Alcohol intoxication FINDINGS: A portable upright frontal view chest radiograph was obtained. The heart is top-normal in size. The mediastinal contour and pleural spaces are otherwise within normal limits. The lungs are grossly clear. There is no focal consolidation or pneumothorax. The pulmonary vascular pattern is within normal limits. No acute thoracic osseous abnormality. Clear lungs. No acute cardiopulmonary abnormality. On admission     The patient is a 42 y.o. female, with a history of alcohol abuse, bipolar disorder, previous suicide attempt and ideation with behaviors and drug overdose, PE, DVT, SVT, diabetes  who presents via EMS with the complaint of alcohol intoxication. Per the documentation family called EMS because she reportedly drank \"20 fireball shooters\" and had intermittent comments about suicide attempts.   Upon arrival to the emergency room she denied suicidal ideation or attempts and was stating she was anxious and tremulous requesting Ativan to \"help her with her alcohol withdrawal\"     Review of Systems:     Constitutional:  negative for chills, fevers, sweats  Respiratory:  negative for cough, dyspnea on exertion, hemoptysis, shortness of breath, wheezing  Cardiovascular: negative for chest pain, chest pressure/discomfort, lower extremity edema, palpitations  Gastrointestinal:  negative for abdominal pain, constipation, diarrhea, nausea, vomiting  Neurological:  negative for dizziness, headache  Psych: negative for suicidal ideation. Flat affect improved slightly. Data:     Past Medical History:  No change     Social History:  No change    Family History: @no change    Vitals:  Reviewed  Sinus tachycardia related to anxiety. I/O (24Hr): Intake/Output Summary (Last 24 hours) at 6/11/2022 1040  Last data filed at 6/11/2022 0933  Gross per 24 hour   Intake 1063 ml   Output --   Net 1063 ml     Labs:  Reviewed. Potassium and Magnesium supplemented.      Radiology:  N/A    Medications:     Current Meds:   Scheduled Meds:    sodium chloride flush  5-40 mL IntraVENous 2 times per day    insulin lispro  0-12 Units SubCUTAneous TID WC    insulin lispro  0-6 Units SubCUTAneous Nightly    dilTIAZem  360 mg Oral Daily    famotidine  20 mg Oral BID    FLUoxetine  40 mg Oral Daily    fondaparinux  10 mg SubCUTAneous Daily    insulin glargine  50 Units SubCUTAneous Daily    losartan  50 mg Oral Daily    [Held by provider] pravastatin  40 mg Oral Nightly    Vitamin D  1,000 Units Oral Daily    sodium chloride flush  5-40 mL IntraVENous 2 times per day    thiamine  100 mg Oral Daily    folic acid  1 mg Oral Daily    ciprofloxacin  500 mg Oral Daily    therapeutic multivitamin-minerals  1 tablet Oral Daily    miconazole   Topical BID     Continuous Infusions:    sodium chloride 75 mL/hr at 06/11/22 0641    sodium chloride      dextrose      sodium chloride       PRN Meds: sodium chloride flush, sodium chloride, ondansetron **OR** ondansetron, polyethylene glycol, glucose, dextrose bolus **OR** dextrose bolus, glucagon (rDNA), dextrose, traZODone, sodium chloride flush, sodium chloride, hydrOXYzine HCl, LORazepam    Physical Examination:        BP (!) 144/92   Pulse (!) 104   Temp 97.4 °F (36.3 °C) (Oral)   Resp 18   Ht 5' 9\" (1.753 m)   Wt 280 lb (127 kg)   SpO2 100%   BMI 41.35 kg/m²   Temp (24hrs), Av.8 °F (36.6 °C), Min:97.4 °F (36.3 °C), Max:98.1 °F (36.7 °C)    Recent Labs     06/10/22  1115 06/10/22  1626 06/10/22  2032 06/11/22  0636   POCGLU 123* 177* 161* 95       Intake/Output Summary (Last 24 hours) at 2022 1040  Last data filed at 2022 0933  Gross per 24 hour   Intake 1063 ml   Output --   Net 1063 ml     General Appearance:  alert, well appearing, and in no acute distress  Mental status:   Head:  normocephalic, atraumatic. Eye: no icterus, redness, pupils equal and reactive, extraocular eye movements intact, conjunctiva clear  Ear: normal external ear, no discharge, hearing intact  Nose:  no drainage noted  Mouth: mucous membranes moist  Neck: supple, no carotid bruits, thyroid not palpable  Lungs: Bilateral equal air entry, clear to ausculation, no wheezing, rales or rhonchi, normal effort  Cardiovascular: normal rate, regular rhythm, no murmur, gallop, rub. Abdomen: Soft, nontender, nondistended, normal bowel sounds, no hepatomegaly or splenomegaly  Neurologic: There are no new focal motor or sensory deficits,   Skin: No gross lesions, rashes, bruising or bleeding on exposed skin area  Extremities:  peripheral pulses palpable, no pedal edema or calf pain with palpation  Psych:     Negative for flapping tremor with arms extended   Right Brachial Midline in Place. No signs of infection.       Assessment:        Primary Problem  Acute alcoholic intoxication without complication (HCC)    Principal Problem:    Acute alcoholic intoxication without complication (HCC)  Active Problems:    Transaminasemia    Tachycardia    Type 2 diabetes mellitus with diabetic polyneuropathy, with long-term current use of insulin (HCC)    Depression    Essential hypertension    Bipolar affective disorder in remission [facility])    History of pulmonary embolism Antiphospholipid syndrome (HCC)    Alcohol intoxication ([address]    Severe benzodiazepine use disorder ([address]    Acute cystitis with hematuria    History of pulmonary embolus (PE)  Resolved Problems:    * No resolved hospital problems. *     Plan:        6/11/22    -Continue telemetry and monitor for tachycardia  -Ativan PRN appropriate. Will de-escalate frequency.   -Follow up on Psych recommendations  -Continue Cipro for UTI. -Continue to work with PT/OT     Hospital Problems           Last Modified POA    * (Principal) Acute alcoholic intoxication without complication ([address] 0/77/4505 Yes    Transaminasemia 6/10/2022 Yes    Tachycardia 6/10/2022 Yes    Type 2 diabetes mellitus with diabetic polyneuropathy, with long-term current use of insulin ([address] 6/10/2022 Yes    Depression 6/10/2022 Yes    Essential hypertension (Chronic) 6/10/2022 Yes    Bipolar affective disorder in remission ([address] 6/10/2022 Yes    History of pulmonary embolism 6/10/2022 Yes    Antiphospholipid syndrome ([address] 6/10/2022 Yes    Alcohol intoxication ([address] 6/10/2022 Yes    Severe benzodiazepine use disorder ([address] 6/10/2022 Yes    Acute cystitis with hematuria 6/10/2022 Yes    History of pulmonary embolus (PE) 6/10/2022 Yes               Disposition: Ongoing pending psych evaluation and recommendations. Further recommendations after discussion with Dr. Kaden Chester. Thomas Ram MD  PGY-2, Internal Medicine Resident  [id number]  6/11/2022 10:54 AM    Attestation and add on       I have discussed the care of Conner King , including pertinent history and exam findings,      6/11/22    with the resident. I have seen and examined the patient and the key elements of all parts of the encounter have been performed by me . I agree with the assessment, plan and orders as documented by the resident.      Hospital Problems           Last Modified POA    * (Principal) Acute alcoholic intoxication without complication ([address] 6/10/2022 Yes    Transaminasemia 6/10/2022 Yes    Tachycardia 6/10/2022 Yes    Type 2 diabetes mellitus with diabetic polyneuropathy, with long-term current use of insulin ([facility] 6/10/2022 Yes    Depression 6/10/2022 Yes    Essential hypertension (Chronic) 6/10/2022 Yes    Bipolar affective disorder in remission ([facility] 6/10/2022 Yes    History of pulmonary embolism 6/10/2022 Yes    Antiphospholipid syndrome ([facility] 6/10/2022 Yes    Alcohol intoxication ([facility] 6/10/2022 Yes    Severe benzodiazepine use disorder ([facility] 6/10/2022 Yes    Acute cystitis with hematuria 6/10/2022 Yes    History of pulmonary embolus (PE) 6/10/2022 Yes             ''''''''''       MD MASON Colvin [address], [address] [place] [address].    Phone [phone]   Fax: [phone]  Answering Service: [phone]

## 2022-06-12 VITALS
HEIGHT: 69 IN | BODY MASS INDEX: 41.21 KG/M2 | TEMPERATURE: 98.7 F | WEIGHT: 278.22 LBS | HEART RATE: 87 BPM | SYSTOLIC BLOOD PRESSURE: 154 MMHG | RESPIRATION RATE: 16 BRPM | OXYGEN SATURATION: 100 % | DIASTOLIC BLOOD PRESSURE: 75 MMHG

## 2022-06-12 LAB
ABSOLUTE EOS #: 0.05 K/UL (ref 0–0.4)
ABSOLUTE LYMPH #: 1.87 K/UL (ref 1–4.8)
ABSOLUTE MONO #: 0.34 K/UL (ref 0.1–1.3)
ANION GAP SERPL CALCULATED.3IONS-SCNC: 8 MMOL/L (ref 9–17)
BASOPHILS # BLD: 0 % (ref 0–2)
BASOPHILS ABSOLUTE: 0 K/UL (ref 0–0.2)
BUN BLDV-MCNC: 8 MG/DL (ref 6–20)
CALCIUM SERPL-MCNC: 8.2 MG/DL (ref 8.6–10.4)
CHLORIDE BLD-SCNC: 104 MMOL/L (ref 98–107)
CO2: 25 MMOL/L (ref 20–31)
CREAT SERPL-MCNC: 0.63 MG/DL (ref 0.5–0.9)
EOSINOPHILS RELATIVE PERCENT: 1 % (ref 0–4)
GFR AFRICAN AMERICAN: >60 ML/MIN
GFR NON-AFRICAN AMERICAN: >60 ML/MIN
GFR SERPL CREATININE-BSD FRML MDRD: ABNORMAL ML/MIN/{1.73_M2}
GLUCOSE BLD-MCNC: 103 MG/DL (ref 65–105)
GLUCOSE BLD-MCNC: 107 MG/DL (ref 65–105)
GLUCOSE BLD-MCNC: 57 MG/DL (ref 65–105)
GLUCOSE BLD-MCNC: 59 MG/DL (ref 65–105)
GLUCOSE BLD-MCNC: 70 MG/DL (ref 70–99)
GLUCOSE BLD-MCNC: 73 MG/DL (ref 65–105)
HCT VFR BLD CALC: 39.9 % (ref 36–46)
HEMOGLOBIN: 13.1 G/DL (ref 12–16)
LYMPHOCYTES # BLD: 39 % (ref 24–44)
MCH RBC QN AUTO: 30.9 PG (ref 26–34)
MCHC RBC AUTO-ENTMCNC: 32.8 G/DL (ref 31–37)
MCV RBC AUTO: 94 FL (ref 80–100)
MONOCYTES # BLD: 7 % (ref 1–7)
MORPHOLOGY: NORMAL
PDW BLD-RTO: 14.9 % (ref 11.5–14.9)
PLATELET # BLD: 133 K/UL (ref 150–450)
PMV BLD AUTO: 10.2 FL (ref 6–12)
POTASSIUM SERPL-SCNC: 3.7 MMOL/L (ref 3.7–5.3)
RBC # BLD: 4.24 M/UL (ref 4–5.2)
SEG NEUTROPHILS: 53 % (ref 36–66)
SEGMENTED NEUTROPHILS ABSOLUTE COUNT: 2.54 K/UL (ref 1.3–9.1)
SODIUM BLD-SCNC: 137 MMOL/L (ref 135–144)
WBC # BLD: 4.8 K/UL (ref 3.5–11)

## 2022-06-12 PROCEDURE — 6360000002 HC RX W HCPCS: Performed by: INTERNAL MEDICINE

## 2022-06-12 PROCEDURE — 80048 BASIC METABOLIC PNL TOTAL CA: CPT

## 2022-06-12 PROCEDURE — 6360000002 HC RX W HCPCS: Performed by: NURSE PRACTITIONER

## 2022-06-12 PROCEDURE — 6370000000 HC RX 637 (ALT 250 FOR IP): Performed by: NURSE PRACTITIONER

## 2022-06-12 PROCEDURE — 2580000003 HC RX 258: Performed by: STUDENT IN AN ORGANIZED HEALTH CARE EDUCATION/TRAINING PROGRAM

## 2022-06-12 PROCEDURE — 6370000000 HC RX 637 (ALT 250 FOR IP): Performed by: STUDENT IN AN ORGANIZED HEALTH CARE EDUCATION/TRAINING PROGRAM

## 2022-06-12 PROCEDURE — 85025 COMPLETE CBC W/AUTO DIFF WBC: CPT

## 2022-06-12 PROCEDURE — 82947 ASSAY GLUCOSE BLOOD QUANT: CPT

## 2022-06-12 PROCEDURE — 6370000000 HC RX 637 (ALT 250 FOR IP): Performed by: INTERNAL MEDICINE

## 2022-06-12 PROCEDURE — 36415 COLL VENOUS BLD VENIPUNCTURE: CPT

## 2022-06-12 PROCEDURE — 99239 HOSP IP/OBS DSCHRG MGMT >30: CPT | Performed by: INTERNAL MEDICINE

## 2022-06-12 RX ORDER — THIAMINE MONONITRATE (VIT B1) 100 MG
100 TABLET ORAL DAILY
Qty: 30 TABLET | Refills: 0 | Status: SHIPPED | OUTPATIENT
Start: 2022-06-13

## 2022-06-12 RX ORDER — FLUOXETINE HYDROCHLORIDE 20 MG/1
40 CAPSULE ORAL DAILY
Qty: 30 CAPSULE | Refills: 3 | Status: SHIPPED | OUTPATIENT
Start: 2022-06-12

## 2022-06-12 RX ORDER — METOPROLOL SUCCINATE 25 MG/1
25 TABLET, EXTENDED RELEASE ORAL DAILY
Qty: 30 TABLET | Refills: 3 | Status: SHIPPED | OUTPATIENT
Start: 2022-06-13

## 2022-06-12 RX ORDER — CIPROFLOXACIN 250 MG/1
250 TABLET, FILM COATED ORAL EVERY 12 HOURS SCHEDULED
Qty: 4 TABLET | Refills: 0 | Status: SHIPPED | OUTPATIENT
Start: 2022-06-12 | End: 2022-06-14

## 2022-06-12 RX ORDER — FOLIC ACID 1 MG/1
1 TABLET ORAL DAILY
Qty: 30 TABLET | Refills: 3 | Status: SHIPPED | OUTPATIENT
Start: 2022-06-13

## 2022-06-12 RX ORDER — POLYETHYLENE GLYCOL 3350 17 G/17G
17 POWDER, FOR SOLUTION ORAL DAILY PRN
Qty: 527 G | Refills: 1 | Status: SHIPPED | OUTPATIENT
Start: 2022-06-12 | End: 2022-07-12

## 2022-06-12 RX ORDER — INSULIN LISPRO 100 [IU]/ML
INJECTION, SOLUTION INTRAVENOUS; SUBCUTANEOUS
Qty: 1 EACH | Refills: 0 | Status: SHIPPED | OUTPATIENT
Start: 2022-06-12

## 2022-06-12 RX ORDER — INSULIN GLARGINE 100 [IU]/ML
50 INJECTION, SOLUTION SUBCUTANEOUS DAILY
Qty: 10 ML | Refills: 3 | Status: SHIPPED | OUTPATIENT
Start: 2022-06-13

## 2022-06-12 RX ADMIN — SODIUM CHLORIDE, PRESERVATIVE FREE 10 ML: 5 INJECTION INTRAVENOUS at 08:39

## 2022-06-12 RX ADMIN — ONDANSETRON 4 MG: 2 INJECTION INTRAMUSCULAR; INTRAVENOUS at 11:22

## 2022-06-12 RX ADMIN — LORAZEPAM 2 MG: 2 INJECTION INTRAMUSCULAR; INTRAVENOUS at 06:13

## 2022-06-12 RX ADMIN — INSULIN GLARGINE 50 UNITS: 100 INJECTION, SOLUTION SUBCUTANEOUS at 08:34

## 2022-06-12 RX ADMIN — MULTIPLE VITAMINS W/ MINERALS TAB 1 TABLET: TAB at 08:16

## 2022-06-12 RX ADMIN — ONDANSETRON 4 MG: 2 INJECTION INTRAMUSCULAR; INTRAVENOUS at 06:13

## 2022-06-12 RX ADMIN — SODIUM CHLORIDE: 9 INJECTION, SOLUTION INTRAVENOUS at 01:47

## 2022-06-12 RX ADMIN — FOLIC ACID 1 MG: 1 TABLET ORAL at 08:16

## 2022-06-12 RX ADMIN — Medication 1000 UNITS: at 08:16

## 2022-06-12 RX ADMIN — THIAMINE HCL TAB 100 MG 100 MG: 100 TAB at 08:16

## 2022-06-12 RX ADMIN — FLUOXETINE HYDROCHLORIDE 40 MG: 20 CAPSULE ORAL at 08:16

## 2022-06-12 RX ADMIN — FAMOTIDINE 20 MG: 20 TABLET, FILM COATED ORAL at 08:16

## 2022-06-12 RX ADMIN — DILTIAZEM HYDROCHLORIDE 360 MG: 180 CAPSULE, COATED, EXTENDED RELEASE ORAL at 08:16

## 2022-06-12 RX ADMIN — LOSARTAN POTASSIUM 50 MG: 50 TABLET, FILM COATED ORAL at 08:16

## 2022-06-12 RX ADMIN — METOPROLOL SUCCINATE 25 MG: 25 TABLET, EXTENDED RELEASE ORAL at 08:16

## 2022-06-12 RX ADMIN — LORAZEPAM 2 MG: 2 INJECTION INTRAMUSCULAR; INTRAVENOUS at 11:22

## 2022-06-12 RX ADMIN — CIPROFLOXACIN 250 MG: 250 TABLET, FILM COATED ORAL at 08:19

## 2022-06-12 RX ADMIN — Medication 16 G: at 11:13

## 2022-06-12 ASSESSMENT — PAIN SCALES - GENERAL: PAINLEVEL_OUTOF10: 0

## 2022-06-12 NOTE — PROGRESS NOTES
[place] [address] Internal Medicine    Progress Note     6/12/2022    3:23 PM    Name:   Giancarlo Montejo  MRN:     [id number]     Acct:      [de-identified]   Room:   [facility] Day:  1  Admit Date:  6/9/2022  7:50 PM    PCP:   Ann RODRIGUEZ  Code Status:  Full Code    Subjective:     C/C:   Chief Complaint   Patient presents with    Alcohol Intoxication     Principal Problem:    Acute alcoholic intoxication without complication ([facility]  Active Problems:    Transaminasemia    Tachycardia    Acute alcohol intoxication with alcoholism, uncomplicated ([facility]    Type 2 diabetes mellitus with diabetic polyneuropathy, with long-term current use of insulin ([facility]    Depression    Essential hypertension    Bipolar affective disorder in remission ([facility]    History of pulmonary embolism    Antiphospholipid syndrome ([facility]    Alcohol intoxication ([facility]    Severe benzodiazepine use disorder ([facility]    Acute cystitis with hematuria    History of pulmonary embolus (PE)  Resolved Problems:    * No resolved hospital problems. *    No acute episodes overnight. Patient is heme stable with Tmax of 97.9  Patient appears to be in a better mood today. She is more vocal and \"flat affect\" is no longer present. No signs of withdrawal. Flapping tremor not present with hands extended. Vitals reviewed. Telemetry reviewed. Remains in sinus tachycardia due to anxiety. Ativan 2 mg q 6 PRN order in place. Most recently given at 7 am. Home medications restarted - patient did receive her home Cardizem this morning. Labs reviewed. Potassium and Magnesium supplemented. Adequate urine output - unmeasured urine output occurrence x 1    Patient has midline in place due to poor access. No signs of infection.       Significant last 24 hr data reviewed ;   Vitals:    06/11/22 1845 06/12/22 0000 06/12/22 0630 06/12/22 1245   BP: (!) 145/79 (!) 143/87 138/89 (!) 154/75   Pulse: 93 91 84 87   Resp: 16 16 16 16   Temp: 98.3 °F (36.8 06/12/22 12:37 PM   Result Value Ref Range    POC Glucose 107 (H) 65 - 105 mg/dL     Recent Labs     06/11/22  1623 06/11/22  2025 06/12/22  0632 06/12/22  1101 06/12/22  1237   POCGLU 87 182* 73 59* 107*        IR FLUORO GUIDED NEEDLE PLACEMENT    Result Date: 6/10/2022  EXAMINATION: LIMITED ULTRASOUND OF THE ARM FOR MIDLINE/PICC ACCESS, 6/10/2022 TECHNIQUE: The PICC team used ultrasound guidance to place a PICC line. HISTORY: ORDERING SYSTEM PROVIDED HISTORY: midline TECHNOLOGIST PROVIDED HISTORY: midline Is the patient pregnant?->No FLUOROSCOPY DOSE AND TYPE OR TIME AND EXPOSURES: None FINDINGS: Ultrasound images demonstrate patency of a brachial vein which was used for placement of a midline by the PICC team without a radiologist present. By report, a 5.5 Armenian x  15 cm dual-lumen midline was placed. Successful placement of midline. XR CHEST PORTABLE    Result Date: 6/9/2022  EXAMINATION: ONE XRAY VIEW OF THE CHEST 6/9/2022 7:37 pm COMPARISON: March 18, 2022. HISTORY: ORDERING SYSTEM PROVIDED HISTORY: tachy TECHNOLOGIST PROVIDED HISTORY: tachy Reason for Exam: tachy, Alcohol intoxication FINDINGS: A portable upright frontal view chest radiograph was obtained. The heart is top-normal in size. The mediastinal contour and pleural spaces are otherwise within normal limits. The lungs are grossly clear. There is no focal consolidation or pneumothorax. The pulmonary vascular pattern is within normal limits. No acute thoracic osseous abnormality. Clear lungs. No acute cardiopulmonary abnormality. On admission     The patient is a 42 y.o. female, with a history of alcohol abuse, bipolar disorder, previous suicide attempt and ideation with behaviors and drug overdose, PE, DVT, SVT, diabetes  who presents via EMS with the complaint of alcohol intoxication.   Per the documentation family called EMS because she reportedly drank \"20 fireball shooters\" and had intermittent comments about suicide attempts. Upon arrival to the emergency room she denied suicidal ideation or attempts and was stating she was anxious and tremulous requesting Ativan to \"help her with her alcohol withdrawal\"     Review of Systems:     Constitutional:  negative for chills, fevers, sweats  Respiratory:  negative for cough, dyspnea on exertion, hemoptysis, shortness of breath, wheezing  Cardiovascular:  negative for chest pain, chest pressure/discomfort, lower extremity edema, palpitations  Gastrointestinal:  negative for abdominal pain, constipation, diarrhea, nausea, vomiting  Neurological:  negative for dizziness, headache  Psych: negative for suicidal ideation. Flat affect improved slightly. Data:     Past Medical History:  No change     Social History:  No change    Family History: @no change    Vitals:  Reviewed  Sinus tachycardia related to anxiety. I/O (24Hr): Intake/Output Summary (Last 24 hours) at 6/12/2022 1523  Last data filed at 6/12/2022 0940  Gross per 24 hour   Intake 1847 ml   Output --   Net 1847 ml     Labs:  Reviewed. Potassium and Magnesium supplemented.      Radiology:  N/A    Medications:     Current Meds:   Scheduled Meds:    metoprolol succinate  25 mg Oral Daily    ciprofloxacin  250 mg Oral 2 times per day    sodium chloride flush  5-40 mL IntraVENous 2 times per day    insulin lispro  0-12 Units SubCUTAneous TID WC    insulin lispro  0-6 Units SubCUTAneous Nightly    dilTIAZem  360 mg Oral Daily    famotidine  20 mg Oral BID    FLUoxetine  40 mg Oral Daily    fondaparinux  10 mg SubCUTAneous Daily    insulin glargine  50 Units SubCUTAneous Daily    losartan  50 mg Oral Daily    [Held by provider] pravastatin  40 mg Oral Nightly    Vitamin D  1,000 Units Oral Daily    sodium chloride flush  5-40 mL IntraVENous 2 times per day    thiamine  100 mg Oral Daily    folic acid  1 mg Oral Daily    therapeutic multivitamin-minerals  1 tablet Oral Daily    miconazole   Topical BID Continuous Infusions:    sodium chloride 75 mL/hr at 22 0147    sodium chloride      dextrose      sodium chloride       PRN Meds: sodium chloride flush, sodium chloride, ondansetron **OR** ondansetron, polyethylene glycol, glucose, dextrose bolus **OR** dextrose bolus, glucagon (rDNA), dextrose, traZODone, sodium chloride flush, sodium chloride, hydrOXYzine HCl, LORazepam    Physical Examination:        BP (!) 154/75   Pulse 87   Temp 98.7 °F (37.1 °C) (Oral)   Resp 16   Ht 5' 9\" (1.753 m)   Wt 278 lb 3.5 oz (126.2 kg)   SpO2 100%   BMI 41.09 kg/m²   Temp (24hrs), Av.3 °F (36.8 °C), Min:98 °F (36.7 °C), Max:98.7 °F (37.1 °C)    Recent Labs     22  0632 22  1101 22  1237   POCGLU 182* 73 59* 107*       Intake/Output Summary (Last 24 hours) at 2022 1523  Last data filed at 2022 0940  Gross per 24 hour   Intake 1847 ml   Output --   Net 1847 ml     General Appearance:  alert, well appearing, and in no acute distress  Mental status:   Head:  normocephalic, atraumatic. Eye: no icterus, redness, pupils equal and reactive, extraocular eye movements intact, conjunctiva clear  Ear: normal external ear, no discharge, hearing intact  Nose:  no drainage noted  Mouth: mucous membranes moist  Neck: supple, no carotid bruits, thyroid not palpable  Lungs: Bilateral equal air entry, clear to ausculation, no wheezing, rales or rhonchi, normal effort  Cardiovascular: normal rate, regular rhythm, no murmur, gallop, rub. Abdomen: Soft, nontender, nondistended, normal bowel sounds, no hepatomegaly or splenomegaly  Neurologic: There are no new focal motor or sensory deficits,   Skin: No gross lesions, rashes, bruising or bleeding on exposed skin area  Extremities:  peripheral pulses palpable, no pedal edema or calf pain with palpation  Psych:     Negative for flapping tremor with arms extended   Right Brachial Midline in Place. No signs of infection.       Assessment: Primary Problem  Acute alcoholic intoxication without complication (HCC)    Principal Problem:    Acute alcoholic intoxication without complication (HCC)  Active Problems:    Transaminasemia    Tachycardia    Acute alcohol intoxication with alcoholism, uncomplicated ([address]    Type 2 diabetes mellitus with diabetic polyneuropathy, with long-term current use of insulin (HCC)    Depression    Essential hypertension    Bipolar affective disorder in remission ([address]    History of pulmonary embolism    Antiphospholipid syndrome (HCC)    Alcohol intoxication ([address]    Severe benzodiazepine use disorder ([address]    Acute cystitis with hematuria    History of pulmonary embolus (PE)  Resolved Problems:    * No resolved hospital problems. *     Plan:        6/12/22    improving  -Continue telemetry and monitor for tachycardia  -Ativan PRN appropriate. Will de-escalate frequency.   -Follow up on Psych recommendations  -Continue Cipro for UTI. -Continue to work with PT/OT     Hospital Problems           Last Modified POA    * (Principal) Acute alcoholic intoxication without complication ([address] 0/77/6001 Yes    Transaminasemia 6/10/2022 Yes    Tachycardia 6/10/2022 Yes    Acute alcohol intoxication with alcoholism, uncomplicated ([address] 1/57/3118 Yes    Type 2 diabetes mellitus with diabetic polyneuropathy, with long-term current use of insulin ([address] 6/10/2022 Yes    Depression 6/10/2022 Yes    Essential hypertension (Chronic) 6/10/2022 Yes    Bipolar affective disorder in remission ([address] 6/10/2022 Yes    History of pulmonary embolism 6/10/2022 Yes    Antiphospholipid syndrome ([address] 6/10/2022 Yes    Alcohol intoxication ([address] 6/10/2022 Yes    Severe benzodiazepine use disorder ([address] 6/10/2022 Yes    Acute cystitis with hematuria 6/10/2022 Yes    History of pulmonary embolus (PE) 6/10/2022 Yes               Disposition: Ongoing pending psych evaluation and recommendations.      Further recommendations after discussion with  Sujata Mckee.      Electronically signed by Meagan Jones MD on 6/12/22 at 3:24 PM EDT

## 2022-06-12 NOTE — PLAN OF CARE
Problem: Self Harm/Suicidality  Goal: Will have no self-injury during hospital stay  Description: INTERVENTIONS:  1. Q 30 MINUTES: Routine safety checks  2. Q SHIFT & PRN: Assess risk to determine if routine checks are adequate to maintain patient safety  Outcome: Completed

## 2022-06-12 NOTE — DISCHARGE SUMMARY
[facility] Internal Medicine    Discharge Summary     Patient ID: Kat Mittal  :  1980   MRN: [id number]     ACCOUNT:  [de-identified]   Patient's PCP: Josiah Landry Date: 2022   Discharge Date: 2022    Length of Stay: 1  Code Status:  Full Code  Admitting Physician: Glory Abad MD  Discharge Physician: Glory Abad MD     Active Discharge Diagnoses:     Primary Problem  Acute alcoholic intoxication without complication [facility])      [place] Problems    Diagnosis Date Noted    Acute alcohol intoxication with alcoholism, uncomplicated ([facility] [address] [V99.260] 2022     Priority: Medium    Acute alcoholic intoxication without complication ([address] [V74.738] 06/10/2022     Priority: Medium    Transaminasemia [R74.01] 06/10/2022     Priority: Medium    Tachycardia [R00.0]      Priority: Medium    History of pulmonary embolus (PE) [Z86.711] 2019    Acute cystitis with hematuria [N30.01] 2019    Severe benzodiazepine use disorder ([address] [F13.20]     Alcohol intoxication ([address] [F10.929] 2015    Antiphospholipid syndrome ([address] [D68.61]     History of pulmonary embolism [Z86.711]     Bipolar affective disorder in remission ([address] [F31.70] 10/07/2015    Essential hypertension [I10] 10/07/2015    Depression [F32. A] 2015    Type 2 diabetes mellitus with diabetic polyneuropathy, with long-term current use of insulin ([address] [E11.42, Z79.4] 2015       Admission Condition:  fair     Discharged Condition: fair    Hospital Stay:     Hospital Course:  Kat Mittal is a 42 y.o. female who was admitted for the management of Acute alcoholic intoxication without complication ([address] , presented to ER with Alcohol Intoxication          Significant therapeutic interventions:     Significant Diagnostic Studies:   Labs / Micro:        ,     Radiology:    IR FLUORO GUIDED NEEDLE PLACEMENT    Result Date: 6/10/2022  EXAMINATION: LIMITED ULTRASOUND OF THE ARM FOR MIDLINE/PICC ACCESS, 6/10/2022 TECHNIQUE: The PICC team used ultrasound guidance to place a PICC line. HISTORY: ORDERING SYSTEM PROVIDED HISTORY: midline TECHNOLOGIST PROVIDED HISTORY: midline Is the patient pregnant?->No FLUOROSCOPY DOSE AND TYPE OR TIME AND EXPOSURES: None FINDINGS: Ultrasound images demonstrate patency of a brachial vein which was used for placement of a midline by the PICC team without a radiologist present. By report, a 5.5 Romansh x  15 cm dual-lumen midline was placed. Successful placement of midline. XR CHEST PORTABLE    Result Date: 6/9/2022  EXAMINATION: ONE XRAY VIEW OF THE CHEST 6/9/2022 7:37 pm COMPARISON: March 18, 2022. HISTORY: ORDERING SYSTEM PROVIDED HISTORY: tachy TECHNOLOGIST PROVIDED HISTORY: tachy Reason for Exam: tachy, Alcohol intoxication FINDINGS: A portable upright frontal view chest radiograph was obtained. The heart is top-normal in size. The mediastinal contour and pleural spaces are otherwise within normal limits. The lungs are grossly clear. There is no focal consolidation or pneumothorax. The pulmonary vascular pattern is within normal limits. No acute thoracic osseous abnormality. Clear lungs. No acute cardiopulmonary abnormality. Consultations:    Consults:     Final Specialist Recommendations/Findings:   IP CONSULT TO INTERNAL MEDICINE  IP CONSULT TO PSYCHIATRY  IP CONSULT TO SOCIAL WORK      The patient was seen and examined on day of discharge and this discharge summary is in conjunction with any daily progress note from day of discharge.     Discharge plan:     Disposition: Home    Physician Follow Up:     Naty Gracia  [address] [place] [address] [address]  [facility]  [phone]    Schedule an appointment as soon as possible for a visit  For hospital follow up       Requiring Further Evaluation/Follow Up POST HOSPITALIZATION/Incidental Findings:    Diet: cardiac diet    Activity: As tolerated    Instructions to Patient:     Discharge Medications:      Medication List      CONTINUE taking these medications    glucose monitoring kit  Testing twice a day. Please dispense according to patients insurance. Insulin Pen Needle 31G X 5 MM Misc  1 each by Does not apply route daily     Lancets 30G Misc  Testing twice a day. Please dispense according to patients insurance. SYRINGE 3CC/25GX1\" 25G X 1\" 3 ML Misc  To be used with B12 injections monthly        ASK your doctor about these medications    acetaminophen 500 MG tablet  Commonly known as: Tylenol  Take 2 tablets by mouth every 8 hours as needed for Pain     blood glucose test strips  Testing twice a day. Please dispense according to patients insurance.      buPROPion 300 MG extended release tablet  Commonly known as: WELLBUTRIN XL     cyanocobalamin 1000 MCG/ML injection  Inject 1 mL into the muscle every 7 days     cyclobenzaprine 10 mg tablet  Commonly known as: FLEXERIL     dilTIAZem 360 MG extended release capsule  Commonly known as: CARDIZEM CD     famotidine 20 MG tablet  Commonly known as: PEPCID  TAKE 1 TABLET BY MOUTH TWICE DAILY     Farxiga 10 MG tablet  Generic drug: dapagliflozin  TAKE 1 TABLET BY MOUTH EVERY MORNING     fondaparinux 10 MG/0.8ML Soln injection  Commonly known as: ARIXTRA  Inject 0.8 mLs into the skin daily     GlucaGen HypoKit 1 MG Solr injection  Generic drug: glucagon (rDNA)     HYDROcodone-acetaminophen 5-325 MG per tablet  Commonly known as: NORCO     lamoTRIgine 200 MG tablet  Commonly known as: LAMICTAL     losartan 50 mg tablet  Commonly known as: COZAAR     NovoLOG FlexPen 100 UNIT/ML injection pen  Generic drug: insulin aspart     pravastatin 40 MG tablet  Commonly known as: PRAVACHOL  TAKE 1 TABLET(40 MG) BY MOUTH DAILY     therapeutic multivitamin-minerals tablet     Toujeo Max SoloStar 300 UNIT/ML Sopn  Generic drug: Insulin Glargine (2 Unit Dial)     traZODone 100 MG tablet  Commonly known as: DESYREL     valACYclovir 500 MG tablet  Commonly known as: VALTREX  Take 1 tablet by mouth daily     vitamin D 1000 UNIT Tabs tablet  Commonly known as: CHOLECALCIFEROL            Time Spent on discharge is  35 mins in patient examination, evaluation, counseling as well as medication reconciliation, prescriptions for required medications, discharge plan and follow up. Electronically signed by   Daisha Pabon MD  6/12/2022  3:53 PM      Thank you Dr. Miles Gorman for the opportunity to be involved in this patient's care.

## 2022-06-12 NOTE — DISCHARGE INSTR - DIET

## 2022-06-12 NOTE — CARE COORDINATION
ONGOING DISCHARGE PLAN:    Patient is alert and oriented x4. Spoke with patient regarding discharge plan and patient confirms that plan is still home without any needs. DME: Glucometer/supplies Denies needs for VNS. [facility]. For Alcohol consumption - pt is a binge drineker. ORANGE HEADER 20%    Will continue to follow for additional discharge needs.     Electronically signed by Nico Caruso RN on 6/12/2022 at 11:01 AM

## 2022-06-13 ENCOUNTER — CARE COORDINATION (OUTPATIENT)
Dept: OTHER | Facility: CLINIC | Age: 42
End: 2022-06-13

## 2022-06-13 NOTE — CARE COORDINATION
Care Transitions Outreach Attempt #2    Call within 2 business days of discharge: Yes      Attempted to reach patient for transitions of care follow up. Unable to reach patient. Left VM requesting call back. CN sign off if no return call received. Non [facility] PCP. Patient: Derrell Dorman Patient : 1980 MRN: [id number]    Last Discharge [facility]       Complaint Diagnosis Description Type Department Provider    22 Alcohol Intoxication Acute alcoholic intoxication without complication ([facility] [address] . .. ED to Hosp-Admission (Discharged) (ADMITTED) Davi Marinelli MD; Aakash Whitman. .. Was this an external facility discharge? No Discharge Facility: [facility]    Noted following upcoming appointments from discharge chart review:   [facility] follow up appointment(s): No future appointments.     Stephanie Narayanan, RN BSN   Care Transitions Nurse  [phone]

## 2022-06-14 ENCOUNTER — CARE COORDINATION (OUTPATIENT)
Dept: OTHER | Facility: CLINIC | Age: 42
End: 2022-06-14

## 2022-06-14 NOTE — CARE COORDINATION
Care Transitions Outreach Attempt #2    Call within 2 business days of discharge: Yes     Attempt #2 to reach patient for transitions of care follow up. Unable to reach patient. VMB full. Sent [facility] notification. CTN sign off. Patient: Suzanne Sarkar Patient : 1980 MRN: [id number]    Last Discharge [facility]       Complaint Diagnosis Description Type Department Provider    22 Alcohol Intoxication Acute alcoholic intoxication without complication ([facility] [address] . .. ED to Hosp-Admission (Discharged) (ADMITTED) Rajani De La Garza MD; Khadijah Murdock. .. Was this an external facility discharge? No Discharge Facility: [facility]    Noted following upcoming appointments from discharge chart review:   [facility] follow up appointment(s): No future appointments.     Deangelo Wakefield RN BSN   Care Transitions Nurse  [phone]

## 2022-07-11 RX ORDER — CALCIUM CARBONATE/VITAMIN D3 600 MG-10
TABLET ORAL
Qty: 30 TABLET | Refills: 0 | OUTPATIENT
Start: 2022-07-11

## 2022-10-10 RX ORDER — FOLIC ACID 1 MG/1
1 TABLET ORAL DAILY
Qty: 30 TABLET | Refills: 3 | OUTPATIENT
Start: 2022-10-10

## 2022-10-10 NOTE — TELEPHONE ENCOUNTER
Left message on machine for patient informing for medication refill being sent to the wrong provider and to contact the pharmacy

## 2022-10-11 NOTE — ED PROVIDER NOTES
11:51 PM EST  I received signout on this patient this is a 79-year-old female with a history of alcohol abuse who came in with suicidal ideation patient is now sober she denies any suicidal ideation at this time. She did have mild tremor I gave her 2 mg of oral Ativan and on reevaluation she no longer had the tremor.   The patient will be discharged to follow-up as an outpatient    Diagnosis: Alcohol intoxication, suicidal ideation     Sylvain Power MD  03/05/22 4019 

## 2022-11-24 ENCOUNTER — APPOINTMENT (OUTPATIENT)
Dept: GENERAL RADIOLOGY | Age: 42
DRG: 897 | End: 2022-11-24
Payer: MEDICARE

## 2022-11-24 ENCOUNTER — APPOINTMENT (OUTPATIENT)
Dept: CT IMAGING | Age: 42
DRG: 897 | End: 2022-11-24
Payer: MEDICARE

## 2022-11-24 ENCOUNTER — HOSPITAL ENCOUNTER (INPATIENT)
Age: 42
LOS: 2 days | Discharge: HOME | DRG: 897 | End: 2022-11-26
Attending: EMERGENCY MEDICINE | Admitting: INTERNAL MEDICINE
Payer: MEDICARE

## 2022-11-24 DIAGNOSIS — F10.930 ALCOHOL WITHDRAWAL SYNDROME WITHOUT COMPLICATION (HCC): ICD-10-CM

## 2022-11-24 DIAGNOSIS — E83.42 HYPOMAGNESEMIA: Primary | ICD-10-CM

## 2022-11-24 PROBLEM — F10.939 ALCOHOL WITHDRAWAL SYNDROME WITH COMPLICATION (HCC): Status: ACTIVE | Noted: 2022-11-24

## 2022-11-24 LAB
ABSOLUTE EOS #: 0 K/UL (ref 0–0.4)
ABSOLUTE LYMPH #: 1.14 K/UL (ref 1–4.8)
ABSOLUTE MONO #: 0.57 K/UL (ref 0.1–1.3)
ANION GAP SERPL CALCULATED.3IONS-SCNC: 19 MMOL/L (ref 9–17)
BASOPHILS # BLD: 0 % (ref 0–2)
BASOPHILS ABSOLUTE: 0 K/UL (ref 0–0.2)
BUN BLDV-MCNC: 16 MG/DL (ref 6–20)
C-REACTIVE PROTEIN: <3 MG/L (ref 0–5)
CALCIUM SERPL-MCNC: 8.2 MG/DL (ref 8.6–10.4)
CHLORIDE BLD-SCNC: 92 MMOL/L (ref 98–107)
CO2: 19 MMOL/L (ref 20–31)
CREAT SERPL-MCNC: 0.84 MG/DL (ref 0.5–0.9)
EOSINOPHILS RELATIVE PERCENT: 0 % (ref 0–4)
ETHANOL PERCENT: <0.01 %
ETHANOL: <10 MG/DL
GFR SERPL CREATININE-BSD FRML MDRD: >60 ML/MIN/1.73M2
GLUCOSE BLD-MCNC: 181 MG/DL (ref 65–105)
GLUCOSE BLD-MCNC: 324 MG/DL (ref 65–105)
GLUCOSE BLD-MCNC: 390 MG/DL (ref 70–99)
HCT VFR BLD CALC: 42.9 % (ref 36–46)
HEMOGLOBIN: 14.3 G/DL (ref 12–16)
INR BLD: 1.1
LYMPHOCYTES # BLD: 8 % (ref 24–44)
MAGNESIUM: 1.3 MG/DL (ref 1.6–2.6)
MCH RBC QN AUTO: 30 PG (ref 26–34)
MCHC RBC AUTO-ENTMCNC: 33.2 G/DL (ref 31–37)
MCV RBC AUTO: 90.4 FL (ref 80–100)
MONOCYTES # BLD: 4 % (ref 1–7)
MORPHOLOGY: NORMAL
PARTIAL THROMBOPLASTIN TIME: 32.4 SEC (ref 24–36)
PDW BLD-RTO: 13 % (ref 11.5–14.9)
PHOSPHORUS: 2.9 MG/DL (ref 2.6–4.5)
PLATELET # BLD: 346 K/UL (ref 150–450)
PMV BLD AUTO: 8.9 FL (ref 6–12)
POTASSIUM SERPL-SCNC: 4.4 MMOL/L (ref 3.7–5.3)
PROCALCITONIN: 0.06 NG/ML
PROTHROMBIN TIME: 14.5 SEC (ref 11.8–14.6)
RBC # BLD: 4.74 M/UL (ref 4–5.2)
SEG NEUTROPHILS: 88 % (ref 36–66)
SEGMENTED NEUTROPHILS ABSOLUTE COUNT: 12.49 K/UL (ref 1.3–9.1)
SODIUM BLD-SCNC: 130 MMOL/L (ref 135–144)
TROPONIN, HIGH SENSITIVITY: 39 NG/L (ref 0–14)
TROPONIN, HIGH SENSITIVITY: 40 NG/L (ref 0–14)
WBC # BLD: 14.2 K/UL (ref 3.5–11)

## 2022-11-24 PROCEDURE — 85610 PROTHROMBIN TIME: CPT

## 2022-11-24 PROCEDURE — 72125 CT NECK SPINE W/O DYE: CPT

## 2022-11-24 PROCEDURE — 93005 ELECTROCARDIOGRAM TRACING: CPT

## 2022-11-24 PROCEDURE — 84484 ASSAY OF TROPONIN QUANT: CPT

## 2022-11-24 PROCEDURE — 80048 BASIC METABOLIC PNL TOTAL CA: CPT

## 2022-11-24 PROCEDURE — G0480 DRUG TEST DEF 1-7 CLASSES: HCPCS

## 2022-11-24 PROCEDURE — 86140 C-REACTIVE PROTEIN: CPT

## 2022-11-24 PROCEDURE — 36415 COLL VENOUS BLD VENIPUNCTURE: CPT

## 2022-11-24 PROCEDURE — 2060000000 HC ICU INTERMEDIATE R&B

## 2022-11-24 PROCEDURE — 85730 THROMBOPLASTIN TIME PARTIAL: CPT

## 2022-11-24 PROCEDURE — 70450 CT HEAD/BRAIN W/O DYE: CPT

## 2022-11-24 PROCEDURE — 6370000000 HC RX 637 (ALT 250 FOR IP): Performed by: EMERGENCY MEDICINE

## 2022-11-24 PROCEDURE — 72128 CT CHEST SPINE W/O DYE: CPT

## 2022-11-24 PROCEDURE — 99285 EMERGENCY DEPT VISIT HI MDM: CPT

## 2022-11-24 PROCEDURE — 72170 X-RAY EXAM OF PELVIS: CPT

## 2022-11-24 PROCEDURE — 71045 X-RAY EXAM CHEST 1 VIEW: CPT

## 2022-11-24 PROCEDURE — 85025 COMPLETE CBC W/AUTO DIFF WBC: CPT

## 2022-11-24 PROCEDURE — 84100 ASSAY OF PHOSPHORUS: CPT

## 2022-11-24 PROCEDURE — 6360000002 HC RX W HCPCS: Performed by: EMERGENCY MEDICINE

## 2022-11-24 PROCEDURE — 6370000000 HC RX 637 (ALT 250 FOR IP)

## 2022-11-24 PROCEDURE — 84145 PROCALCITONIN (PCT): CPT

## 2022-11-24 PROCEDURE — 83735 ASSAY OF MAGNESIUM: CPT

## 2022-11-24 PROCEDURE — 2580000003 HC RX 258: Performed by: EMERGENCY MEDICINE

## 2022-11-24 PROCEDURE — 82947 ASSAY GLUCOSE BLOOD QUANT: CPT

## 2022-11-24 PROCEDURE — 2580000003 HC RX 258

## 2022-11-24 PROCEDURE — 72131 CT LUMBAR SPINE W/O DYE: CPT

## 2022-11-24 PROCEDURE — 99223 1ST HOSP IP/OBS HIGH 75: CPT | Performed by: INTERNAL MEDICINE

## 2022-11-24 PROCEDURE — 87040 BLOOD CULTURE FOR BACTERIA: CPT

## 2022-11-24 PROCEDURE — 96375 TX/PRO/DX INJ NEW DRUG ADDON: CPT

## 2022-11-24 PROCEDURE — 2500000003 HC RX 250 WO HCPCS: Performed by: EMERGENCY MEDICINE

## 2022-11-24 PROCEDURE — 96365 THER/PROPH/DIAG IV INF INIT: CPT

## 2022-11-24 RX ORDER — LORAZEPAM 1 MG/1
4 TABLET ORAL
Status: DISCONTINUED | OUTPATIENT
Start: 2022-11-24 | End: 2022-11-24

## 2022-11-24 RX ORDER — FENTANYL CITRATE 0.05 MG/ML
25 INJECTION, SOLUTION INTRAMUSCULAR; INTRAVENOUS ONCE
Status: COMPLETED | OUTPATIENT
Start: 2022-11-24 | End: 2022-11-24

## 2022-11-24 RX ORDER — ENOXAPARIN SODIUM 100 MG/ML
30 INJECTION SUBCUTANEOUS 2 TIMES DAILY
Status: DISCONTINUED | OUTPATIENT
Start: 2022-11-24 | End: 2022-11-25

## 2022-11-24 RX ORDER — ACETAMINOPHEN 650 MG/1
650 SUPPOSITORY RECTAL EVERY 6 HOURS PRN
Status: DISCONTINUED | OUTPATIENT
Start: 2022-11-24 | End: 2022-11-26 | Stop reason: HOSPADM

## 2022-11-24 RX ORDER — LORAZEPAM 2 MG/ML
1 INJECTION INTRAMUSCULAR
Status: DISCONTINUED | OUTPATIENT
Start: 2022-11-24 | End: 2022-11-24

## 2022-11-24 RX ORDER — INSULIN GLARGINE 100 [IU]/ML
50 INJECTION, SOLUTION SUBCUTANEOUS DAILY
Status: DISCONTINUED | OUTPATIENT
Start: 2022-11-24 | End: 2022-11-25

## 2022-11-24 RX ORDER — ONDANSETRON 2 MG/ML
4 INJECTION INTRAMUSCULAR; INTRAVENOUS EVERY 6 HOURS PRN
Status: DISCONTINUED | OUTPATIENT
Start: 2022-11-24 | End: 2022-11-26 | Stop reason: HOSPADM

## 2022-11-24 RX ORDER — PRAVASTATIN SODIUM 40 MG
40 TABLET ORAL NIGHTLY
Status: DISCONTINUED | OUTPATIENT
Start: 2022-11-24 | End: 2022-11-26 | Stop reason: HOSPADM

## 2022-11-24 RX ORDER — LORAZEPAM 2 MG/ML
1 INJECTION INTRAMUSCULAR ONCE
Status: COMPLETED | OUTPATIENT
Start: 2022-11-24 | End: 2022-11-24

## 2022-11-24 RX ORDER — LORAZEPAM 1 MG/1
3 TABLET ORAL
Status: DISCONTINUED | OUTPATIENT
Start: 2022-11-24 | End: 2022-11-24

## 2022-11-24 RX ORDER — LORAZEPAM 1 MG/1
2 TABLET ORAL
Status: DISCONTINUED | OUTPATIENT
Start: 2022-11-24 | End: 2022-11-24

## 2022-11-24 RX ORDER — GAUZE BANDAGE 2" X 2"
100 BANDAGE TOPICAL DAILY
Status: DISCONTINUED | OUTPATIENT
Start: 2022-11-24 | End: 2022-11-24

## 2022-11-24 RX ORDER — LORAZEPAM 1 MG/1
2 TABLET ORAL
Status: DISCONTINUED | OUTPATIENT
Start: 2022-11-24 | End: 2022-11-26 | Stop reason: HOSPADM

## 2022-11-24 RX ORDER — FAMOTIDINE 20 MG/1
20 TABLET, FILM COATED ORAL 2 TIMES DAILY
Status: DISCONTINUED | OUTPATIENT
Start: 2022-11-24 | End: 2022-11-26 | Stop reason: HOSPADM

## 2022-11-24 RX ORDER — LORAZEPAM 2 MG/ML
3 INJECTION INTRAMUSCULAR
Status: DISCONTINUED | OUTPATIENT
Start: 2022-11-24 | End: 2022-11-26 | Stop reason: HOSPADM

## 2022-11-24 RX ORDER — MAGNESIUM SULFATE HEPTAHYDRATE 40 MG/ML
2000 INJECTION, SOLUTION INTRAVENOUS PRN
Status: DISCONTINUED | OUTPATIENT
Start: 2022-11-24 | End: 2022-11-26 | Stop reason: HOSPADM

## 2022-11-24 RX ORDER — POTASSIUM CHLORIDE 7.45 MG/ML
10 INJECTION INTRAVENOUS PRN
Status: DISCONTINUED | OUTPATIENT
Start: 2022-11-24 | End: 2022-11-26 | Stop reason: HOSPADM

## 2022-11-24 RX ORDER — GAUZE BANDAGE 2" X 2"
100 BANDAGE TOPICAL DAILY
Status: DISCONTINUED | OUTPATIENT
Start: 2022-11-24 | End: 2022-11-26 | Stop reason: HOSPADM

## 2022-11-24 RX ORDER — INSULIN LISPRO 100 [IU]/ML
0-8 INJECTION, SOLUTION INTRAVENOUS; SUBCUTANEOUS
Status: DISCONTINUED | OUTPATIENT
Start: 2022-11-24 | End: 2022-11-26 | Stop reason: HOSPADM

## 2022-11-24 RX ORDER — SODIUM CHLORIDE 9 MG/ML
INJECTION, SOLUTION INTRAVENOUS PRN
Status: DISCONTINUED | OUTPATIENT
Start: 2022-11-24 | End: 2022-11-26 | Stop reason: HOSPADM

## 2022-11-24 RX ORDER — LORAZEPAM 2 MG/ML
2 INJECTION INTRAMUSCULAR
Status: DISCONTINUED | OUTPATIENT
Start: 2022-11-24 | End: 2022-11-24

## 2022-11-24 RX ORDER — SODIUM CHLORIDE 0.9 % (FLUSH) 0.9 %
5-40 SYRINGE (ML) INJECTION PRN
Status: DISCONTINUED | OUTPATIENT
Start: 2022-11-24 | End: 2022-11-26 | Stop reason: HOSPADM

## 2022-11-24 RX ORDER — INSULIN LISPRO 100 [IU]/ML
0-4 INJECTION, SOLUTION INTRAVENOUS; SUBCUTANEOUS NIGHTLY
Status: DISCONTINUED | OUTPATIENT
Start: 2022-11-24 | End: 2022-11-26 | Stop reason: HOSPADM

## 2022-11-24 RX ORDER — DILTIAZEM HYDROCHLORIDE 180 MG/1
360 CAPSULE, COATED, EXTENDED RELEASE ORAL DAILY
Status: DISCONTINUED | OUTPATIENT
Start: 2022-11-24 | End: 2022-11-26 | Stop reason: HOSPADM

## 2022-11-24 RX ORDER — SODIUM CHLORIDE 0.9 % (FLUSH) 0.9 %
5-40 SYRINGE (ML) INJECTION EVERY 12 HOURS SCHEDULED
Status: DISCONTINUED | OUTPATIENT
Start: 2022-11-24 | End: 2022-11-26 | Stop reason: HOSPADM

## 2022-11-24 RX ORDER — POTASSIUM CHLORIDE 20 MEQ/1
40 TABLET, EXTENDED RELEASE ORAL PRN
Status: DISCONTINUED | OUTPATIENT
Start: 2022-11-24 | End: 2022-11-26 | Stop reason: HOSPADM

## 2022-11-24 RX ORDER — LORAZEPAM 1 MG/1
1 TABLET ORAL
Status: DISCONTINUED | OUTPATIENT
Start: 2022-11-24 | End: 2022-11-24

## 2022-11-24 RX ORDER — METOPROLOL SUCCINATE 25 MG/1
25 TABLET, EXTENDED RELEASE ORAL DAILY
Status: DISCONTINUED | OUTPATIENT
Start: 2022-11-24 | End: 2022-11-26 | Stop reason: HOSPADM

## 2022-11-24 RX ORDER — LORAZEPAM 1 MG/1
4 TABLET ORAL
Status: DISCONTINUED | OUTPATIENT
Start: 2022-11-24 | End: 2022-11-26 | Stop reason: HOSPADM

## 2022-11-24 RX ORDER — LORAZEPAM 2 MG/ML
3 INJECTION INTRAMUSCULAR
Status: DISCONTINUED | OUTPATIENT
Start: 2022-11-24 | End: 2022-11-24

## 2022-11-24 RX ORDER — DEXTROSE MONOHYDRATE 100 MG/ML
INJECTION, SOLUTION INTRAVENOUS CONTINUOUS PRN
Status: DISCONTINUED | OUTPATIENT
Start: 2022-11-24 | End: 2022-11-26 | Stop reason: HOSPADM

## 2022-11-24 RX ORDER — LORAZEPAM 2 MG/ML
4 INJECTION INTRAMUSCULAR
Status: DISCONTINUED | OUTPATIENT
Start: 2022-11-24 | End: 2022-11-26 | Stop reason: HOSPADM

## 2022-11-24 RX ORDER — FLUOXETINE HYDROCHLORIDE 20 MG/1
40 CAPSULE ORAL DAILY
Status: DISCONTINUED | OUTPATIENT
Start: 2022-11-24 | End: 2022-11-26 | Stop reason: HOSPADM

## 2022-11-24 RX ORDER — LORAZEPAM 1 MG/1
3 TABLET ORAL
Status: DISCONTINUED | OUTPATIENT
Start: 2022-11-24 | End: 2022-11-26 | Stop reason: HOSPADM

## 2022-11-24 RX ORDER — ONDANSETRON 4 MG/1
4 TABLET, ORALLY DISINTEGRATING ORAL EVERY 8 HOURS PRN
Status: DISCONTINUED | OUTPATIENT
Start: 2022-11-24 | End: 2022-11-26 | Stop reason: HOSPADM

## 2022-11-24 RX ORDER — LORAZEPAM 2 MG/ML
2 INJECTION INTRAMUSCULAR
Status: DISCONTINUED | OUTPATIENT
Start: 2022-11-24 | End: 2022-11-26 | Stop reason: HOSPADM

## 2022-11-24 RX ORDER — MAGNESIUM SULFATE HEPTAHYDRATE 40 MG/ML
2000 INJECTION, SOLUTION INTRAVENOUS ONCE
Status: COMPLETED | OUTPATIENT
Start: 2022-11-24 | End: 2022-11-24

## 2022-11-24 RX ORDER — SODIUM CHLORIDE 9 MG/ML
INJECTION, SOLUTION INTRAVENOUS CONTINUOUS
Status: DISCONTINUED | OUTPATIENT
Start: 2022-11-24 | End: 2022-11-26 | Stop reason: HOSPADM

## 2022-11-24 RX ORDER — ACETAMINOPHEN 325 MG/1
650 TABLET ORAL EVERY 6 HOURS PRN
Status: DISCONTINUED | OUTPATIENT
Start: 2022-11-24 | End: 2022-11-26 | Stop reason: HOSPADM

## 2022-11-24 RX ORDER — LORAZEPAM 2 MG/ML
4 INJECTION INTRAMUSCULAR
Status: DISCONTINUED | OUTPATIENT
Start: 2022-11-24 | End: 2022-11-24

## 2022-11-24 RX ORDER — 0.9 % SODIUM CHLORIDE 0.9 %
1000 INTRAVENOUS SOLUTION INTRAVENOUS ONCE
Status: COMPLETED | OUTPATIENT
Start: 2022-11-24 | End: 2022-11-24

## 2022-11-24 RX ORDER — LORAZEPAM 2 MG/ML
1 INJECTION INTRAMUSCULAR
Status: DISCONTINUED | OUTPATIENT
Start: 2022-11-24 | End: 2022-11-26 | Stop reason: HOSPADM

## 2022-11-24 RX ORDER — POLYETHYLENE GLYCOL 3350 17 G/17G
17 POWDER, FOR SOLUTION ORAL DAILY PRN
Status: DISCONTINUED | OUTPATIENT
Start: 2022-11-24 | End: 2022-11-26 | Stop reason: HOSPADM

## 2022-11-24 RX ORDER — LORAZEPAM 1 MG/1
1 TABLET ORAL
Status: DISCONTINUED | OUTPATIENT
Start: 2022-11-24 | End: 2022-11-26 | Stop reason: HOSPADM

## 2022-11-24 RX ORDER — LOSARTAN POTASSIUM 50 MG/1
50 TABLET ORAL DAILY
Status: DISCONTINUED | OUTPATIENT
Start: 2022-11-24 | End: 2022-11-26 | Stop reason: HOSPADM

## 2022-11-24 RX ORDER — FOLIC ACID 1 MG/1
1 TABLET ORAL DAILY
Status: DISCONTINUED | OUTPATIENT
Start: 2022-11-24 | End: 2022-11-26 | Stop reason: HOSPADM

## 2022-11-24 RX ADMIN — FENTANYL CITRATE 25 MCG: 50 INJECTION INTRAMUSCULAR; INTRAVENOUS at 11:47

## 2022-11-24 RX ADMIN — INSULIN GLARGINE 50 UNITS: 100 INJECTION, SOLUTION SUBCUTANEOUS at 14:54

## 2022-11-24 RX ADMIN — FOLIC ACID 1 MG: 1 TABLET ORAL at 14:28

## 2022-11-24 RX ADMIN — PRAVASTATIN SODIUM 40 MG: 40 TABLET ORAL at 22:02

## 2022-11-24 RX ADMIN — FAMOTIDINE 20 MG: 20 TABLET, FILM COATED ORAL at 14:27

## 2022-11-24 RX ADMIN — SODIUM CHLORIDE: 9 INJECTION, SOLUTION INTRAVENOUS at 13:17

## 2022-11-24 RX ADMIN — LORAZEPAM 1 MG: 1 TABLET ORAL at 21:29

## 2022-11-24 RX ADMIN — ACETAMINOPHEN 650 MG: 325 TABLET ORAL at 21:34

## 2022-11-24 RX ADMIN — LORAZEPAM 1 MG: 1 TABLET ORAL at 14:56

## 2022-11-24 RX ADMIN — LORAZEPAM 1 MG: 2 INJECTION INTRAMUSCULAR; INTRAVENOUS at 11:28

## 2022-11-24 RX ADMIN — INSULIN LISPRO 6 UNITS: 100 INJECTION, SOLUTION INTRAVENOUS; SUBCUTANEOUS at 17:10

## 2022-11-24 RX ADMIN — MAGNESIUM SULFATE HEPTAHYDRATE 2000 MG: 40 INJECTION, SOLUTION INTRAVENOUS at 11:50

## 2022-11-24 RX ADMIN — FAMOTIDINE 20 MG: 20 TABLET, FILM COATED ORAL at 21:29

## 2022-11-24 RX ADMIN — SODIUM CHLORIDE 1000 ML: 9 INJECTION, SOLUTION INTRAVENOUS at 11:27

## 2022-11-24 RX ADMIN — THIAMINE HCL TAB 100 MG 100 MG: 100 TAB at 13:11

## 2022-11-24 ASSESSMENT — PAIN - FUNCTIONAL ASSESSMENT
PAIN_FUNCTIONAL_ASSESSMENT: ACTIVITIES ARE NOT PREVENTED
PAIN_FUNCTIONAL_ASSESSMENT: 0-10

## 2022-11-24 ASSESSMENT — ENCOUNTER SYMPTOMS
CHOKING: 0
BACK PAIN: 0
CHEST TIGHTNESS: 0
NAUSEA: 1
ABDOMINAL PAIN: 0
FACIAL SWELLING: 0
PHOTOPHOBIA: 0
EYES NEGATIVE: 1
NAUSEA: 0
COUGH: 0
TROUBLE SWALLOWING: 0
SHORTNESS OF BREATH: 0
EYE PAIN: 0
VOMITING: 0
VOICE CHANGE: 0
COLOR CHANGE: 0
BACK PAIN: 1

## 2022-11-24 ASSESSMENT — PAIN DESCRIPTION - LOCATION
LOCATION: BACK;HEAD
LOCATION: BACK;HEAD
LOCATION: HEAD;BACK
LOCATION: BACK;HEAD

## 2022-11-24 ASSESSMENT — PAIN DESCRIPTION - DESCRIPTORS
DESCRIPTORS: ACHING
DESCRIPTORS: ACHING

## 2022-11-24 ASSESSMENT — LIFESTYLE VARIABLES
HOW MANY STANDARD DRINKS CONTAINING ALCOHOL DO YOU HAVE ON A TYPICAL DAY: 10 OR MORE
HOW OFTEN DO YOU HAVE A DRINK CONTAINING ALCOHOL: MONTHLY OR LESS

## 2022-11-24 ASSESSMENT — PAIN SCALES - WONG BAKER
WONGBAKER_NUMERICALRESPONSE: 2
WONGBAKER_NUMERICALRESPONSE: 2

## 2022-11-24 ASSESSMENT — PAIN SCALES - GENERAL
PAINLEVEL_OUTOF10: 4
PAINLEVEL_OUTOF10: 8
PAINLEVEL_OUTOF10: 3
PAINLEVEL_OUTOF10: 7
PAINLEVEL_OUTOF10: 6

## 2022-11-24 NOTE — ED PROVIDER NOTES
EMERGENCY DEPARTMENT ENCOUNTER    Pt Name: Mohamud Tenorio  MRN: [id number]  [place] 1980  Date of evaluation: 11/24/22  CHIEF COMPLAINT       Chief Complaint   Patient presents with    Fall    Back Pain    Alcohol Problem     HISTORY OF PRESENT ILLNESS   39-year-old female presenting to the ER complaining of alcohol withdrawal.  Patient states she also had a fall and landed on her back. Patient denies any loss of consciousness. Patient does admit to an underlying history of bipolar disorder for which she utilizes Ativan. Patient states it is managed by her psychiatrist.  Patient denies any thoughts of hurting herself or anyone else. Patient states her last drink was at 6 PM yesterday. The history is provided by the patient. Drug / Alcohol Assessment  This is a recurrent problem. Pertinent negatives include no chest pain, no abdominal pain, no headaches and no shortness of breath. REVIEW OF SYSTEMS     Review of Systems   Constitutional:  Negative for activity change, appetite change and fatigue. Shakiness   HENT:  Negative for facial swelling, trouble swallowing and voice change. Eyes:  Negative for photophobia and pain. Respiratory:  Negative for chest tightness and shortness of breath. Cardiovascular:  Negative for chest pain and palpitations. Gastrointestinal:  Negative for abdominal pain, nausea and vomiting. Genitourinary:  Negative for dysuria and urgency. Musculoskeletal:  Negative for arthralgias and back pain. Skin:  Negative for color change and rash. Neurological:  Negative for dizziness, syncope and headaches. Psychiatric/Behavioral:  Negative for behavioral problems and hallucinations.     PASTMEDICAL HISTORY     Past Medical History:   Diagnosis Date    Alcohol abuse     sober [id number]    Anxiety     Arthritis     Painting esophagus     Benzodiazepine overdose 9/26/2015    Bipolar disorder, unspecified ([facility]     Bipolar I disorder, most recent episode Antiphospholipid syndrome (HCC) D68.61    Alcohol intoxication ([address] F10.929    Hypertriglyceridemia E78.1    Chronic post-traumatic stress disorder (PTSD) F43.12    OCD (obsessive compulsive disorder) F42.9    Severe benzodiazepine use disorder (HCC) F13.20    Morbid obesity with BMI of 50.0-59.9, adult (HCC) E66.01, Z68.43    History of MRSA infection Z86.14    Pain of upper abdomen R10.10    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    NAFLD (nonalcoholic fatty liver disease) K76.0    Nondependent opioid abuse in remission ([address] F11.11    Osteopenia M85.80    History of Awa-en-Y gastric bypass Z98.84    Acute cystitis with hematuria N30.01    Herpes genitalis A60.00    History of drug abuse ([address] F19.11    Malabsorption K90.9    History of pulmonary embolus (PE) Z86.711    Vitamin B 12 deficiency E53.8    Vitamin D deficiency E55.9    History of surgery of liver Z98.890    Blood alkaline phosphatase increased compared with prior measurement R74.8    Paroxysmal SVT (supraventricular tachycardia) (HCC) I47.1    Anxiety F41.9    Hypomagnesemia E83.42    Chronic idiopathic constipation K59.04    Recurrent incisional hernia K43.2    History of small bowel obstruction Z87.19    History of peptic ulcer Z87.11    Fibromyalgia M79.7    Polyneuropathy G62.9    COVID-19 virus infection U07.1    Diabetic ketoacidosis without coma associated with type 2 diabetes mellitus (HCC) E11.10    Acute pancreatitis K85.90    Multifocal pneumonia J66.0    Alcoholic hepatitis Y56.07    Acute pulmonary embolism (HCC) T81.89    Acute alcoholic intoxication without complication ([address] V87.965    Transaminasemia R74.01    Tachycardia R00.0    Acute alcohol intoxication with alcoholism, uncomplicated ([address] P26.300    Alcohol withdrawal syndrome with complication ([address] U15.841     SURGICAL HISTORY       Past Surgical History:   Procedure Laterality Date    ABDOMINAL HERNIA REPAIR  2014    wound vac    ABDOMINAL HERNIA REPAIR  11/3/14    BARIATRIC SURGERY  2013 [address] [facility]    CHOLECYSTECTOMY      DILATION AND CURETTAGE OF UTERUS  2005    ENDOSCOPY, COLON, DIAGNOSTIC      EGD    FINGER AMPUTATION Left 02/09/2015    index and ring finger. from frostbite    HERNIA REPAIR      total of 8    IVC FILTER INSERTION      LIVER RESECTION      for hepatic adenoma    LYMPH NODE BIOPSY  1990    JUSTINE AND BSO (CERVIX REMOVED)  2011    TONSILLECTOMY       CURRENT MEDICATIONS       Previous Medications    ACETAMINOPHEN (TYLENOL) 500 MG TABLET    Take 2 tablets by mouth every 8 hours as needed for Pain    BLOOD GLUCOSE MONITOR STRIPS    Testing twice a day. Please dispense according to patients insurance. CYANOCOBALAMIN 1000 MCG/ML INJECTION    Inject 1 mL into the muscle every 7 days    DILTIAZEM (CARDIZEM CD) 360 MG EXTENDED RELEASE CAPSULE    Take 360 mg by mouth daily    FAMOTIDINE (PEPCID) 20 MG TABLET    TAKE 1 TABLET BY MOUTH TWICE DAILY    FARXIGA 10 MG TABLET    TAKE 1 TABLET BY MOUTH EVERY MORNING    FLUOXETINE (PROZAC) 20 MG CAPSULE    Take 2 capsules by mouth daily    FOLIC ACID (FOLVITE) 1 MG TABLET    Take 1 tablet by mouth daily    GLUCOSE MONITORING KIT (FREESTYLE) MONITORING KIT    Testing twice a day. Please dispense according to patients insurance. HYDROCODONE-ACETAMINOPHEN (NORCO) 5-325 MG PER TABLET    Take 1 tablet by mouth every 6 hours as needed for Pain. Indications: Last filled 5/24/22 for 30 days    INSULIN GLARGINE (LANTUS) 100 UNIT/ML INJECTION VIAL    Inject 50 Units into the skin daily    INSULIN LISPRO (HUMALOG) 100 UNIT/ML SOLN INJECTION VIAL    **Medium Dose Corrective Algorithm**  Glucose: Dose:  If <139             No Insulin  140-199 2 Units  200-249 4 Units  250-299 6 Units  300-349 8 Units  350-400 10 Units  Above 400       12 Units    INSULIN PEN NEEDLE 31G X 5 MM MISC    1 each by Does not apply route daily    LANCETS 30G MISC    Testing twice a day. Please dispense according to patients insurance.     LOSARTAN (COZAAR) 50 MG TABLET Take 50 mg by mouth daily    METOPROLOL SUCCINATE (TOPROL XL) 25 MG EXTENDED RELEASE TABLET    Take 1 tablet by mouth daily    MICONAZOLE (MICOTIN) 2 % POWDER    Apply topically 2 times daily. MULTIPLE VITAMINS-MINERALS (THERAPEUTIC MULTIVITAMIN-MINERALS) TABLET    Take 1 tablet by mouth daily    PRAVASTATIN (PRAVACHOL) 40 MG TABLET    TAKE 1 TABLET(40 MG) BY MOUTH DAILY    SYRINGE/NEEDLE, DISP, (SYRINGE 3CC/25GX1\") 25G X 1\" 3 ML MISC    To be used with B12 injections monthly    THIAMINE MONONITRATE (THIAMINE) 100 MG TABLET    Take 1 tablet by mouth daily    TRAZODONE (DESYREL) 100 MG TABLET    Take 100 mg by mouth nightly as needed for Sleep    VITAMIN D (CHOLECALCIFEROL) 1000 UNIT TABS TABLET    Take 1,000 Units by mouth daily      ALLERGIES     is allergic to asa [aspirin], betadine [povidone iodine], celexa [citalopram hydrobromide], citalopram, lasix [furosemide], macrobid [nitrofurantoin], norco [hydrocodone-acetaminophen], betadine [povidone iodine], codeine, lithium, paroxetine, paxil [paroxetine hcl], tape [adhesive tape], and tegretol [carbamazepine]. FAMILY HISTORY     She indicated that her mother is alive. She indicated that her father is alive. She indicated that the status of her maternal aunt is unknown. She indicated that the status of her maternal uncle is unknown. She indicated that the status of her maternal cousin is unknown. SOCIAL HISTORY       Social History     Tobacco Use    Smoking status: Never    Smokeless tobacco: Never   Substance Use Topics    Alcohol use: No     Alcohol/week: 0.0 standard drinks     Comment: Last alcohol use was in 12/2015    Drug use: No     Comment: Pt stole her mom's Benzo 1 yr ago. Pt said she took more than prescribed dose of Valium once in late 90's.      PHYSICAL EXAM     INITIAL VITALS: /89   Pulse (!) 125   Temp 98.1 °F (36.7 °C) (Oral)   Resp 20   Ht 5' 8\" (1.727 m)   Wt 260 lb (117.9 kg)   SpO2 98%   BMI 39.53 kg/m²    Physical Exam  Vitals reviewed. Constitutional:       General: She is not in acute distress. Appearance: Normal appearance. She is not ill-appearing or toxic-appearing. HENT:      Head: Normocephalic and atraumatic. Right Ear: External ear normal.      Left Ear: External ear normal.      Nose: No congestion or rhinorrhea. Eyes:      Extraocular Movements: Extraocular movements intact. Pupils: Pupils are equal, round, and reactive to light. Cardiovascular:      Rate and Rhythm: Regular rhythm. Tachycardia present. Pulses: Normal pulses. Heart sounds: Normal heart sounds. Pulmonary:      Effort: Pulmonary effort is normal. No respiratory distress. Breath sounds: Normal breath sounds. No wheezing. Abdominal:      General: Bowel sounds are normal. There is no distension. Palpations: Abdomen is soft. Tenderness: There is no abdominal tenderness. Musculoskeletal:         General: No deformity or signs of injury. Normal range of motion. Cervical back: No rigidity or tenderness. Skin:     General: Skin is warm and dry. Neurological:      Mental Status: She is alert and oriented to person, place, and time. Mental status is at baseline. Psychiatric:         Mood and Affect: Mood normal.         Behavior: Behavior normal.       MEDICAL DECISION MAKING:   Patient with a history of alcohol abuse. EtOH today is within normal limits. Cardiac work-up in the ER did not display positive signs of acute cardiac ischemia. EKG was reviewed and interpreted by myself, ED physician. Patient did to have sinus tachycardia and is likely due to the alcohol withdrawal.  Patient provided with Ativan which did improve the heart rate. Patient admitted after speaking with the admitting team for alcohol withdrawal.  Patient understands and agrees with the plan. Patient with hypomagnesemia and patient provided with a dose of magnesium replacement in the ER.                    CRITICAL CARE: PROCEDURES:    Procedures  Nurses were unable to gain access with a peripheral IV so I did place an ultrasound-guided 18-gauge in the left upper extremity in the upper arm. There was good flow and good drawback. The patient tolerated the procedure well without any complication. DIAGNOSTIC RESULTS   EKG:All EKG's are interpreted by the Emergency Department Physician who either signs or Co-signs this chart in the absence of a cardiologist.        RADIOLOGY:All plain film, CT, MRI, and formal ultrasound images (except ED bedside ultrasound) are read by the radiologist, see reports below, unless otherwisenoted in MDM or here. XR CHEST PORTABLE   Final Result   No acute intrathoracic process. XR PELVIS (1-2 VIEWS)   Preliminary Result   No acute osseous abnormality in the pelvis. CT LUMBAR SPINE WO CONTRAST   Preliminary Result   1. No acute osseous abnormality in the lumbar spine. 2.  Mild-to-moderate degenerative changes. CT THORACIC SPINE WO CONTRAST   Final Result   Unremarkable CT of the thoracic spine. CT CERVICAL SPINE WO CONTRAST   Final Result   No acute abnormality of the cervical spine. CT HEAD WO CONTRAST   Final Result   No acute intracranial abnormality. LABS: All lab results were reviewed by myself, and all abnormals are listed below.   Labs Reviewed   TROPONIN - Abnormal; Notable for the following components:       Result Value    Troponin, High Sensitivity 40 (*)     All other components within normal limits   MAGNESIUM - Abnormal; Notable for the following components:    Magnesium 1.3 (*)     All other components within normal limits   BASIC METABOLIC PANEL - Abnormal; Notable for the following components:    Glucose 390 (*)     Calcium 8.2 (*)     Sodium 130 (*)     Chloride 92 (*)     CO2 19 (*)     Anion Gap 19 (*)     All other components within normal limits   CBC WITH AUTO DIFFERENTIAL - Abnormal; Notable for the following components:    WBC 14.2 (*)     Seg Neutrophils 88 (*)     Lymphocytes 8 (*)     Segs Absolute 12.49 (*)     All other components within normal limits   APTT   PROTIME-INR   PHOSPHORUS   ETHANOL   C-REACTIVE PROTEIN   PROCALCITONIN   TROPONIN   URINE DRUG SCREEN   URINALYSIS       EMERGENCY DEPARTMENTCOURSE:         Vitals:    Vitals:    11/24/22 0944 11/24/22 1145   BP: 129/78 119/89   Pulse: (!) 125    Resp: 20    Temp: 98.1 °F (36.7 °C)    TempSrc: Oral    SpO2: 100% 98%   Weight: 260 lb (117.9 kg)    Height: 5' 8\" (1.727 m)        The patient was given the following medications while in the emergency department:  Orders Placed This Encounter   Medications    0.9 % sodium chloride bolus    LORazepam (ATIVAN) injection 1 mg    magnesium sulfate 2000 mg in water 50 mL IVPB    fentaNYL (SUBLIMAZE) injection 25 mcg    sodium chloride flush 0.9 % injection 5-40 mL    sodium chloride flush 0.9 % injection 5-40 mL    0.9 % sodium chloride infusion    thiamine mononitrate tablet 100 mg    DISCONTD: LORazepam (ATIVAN) tablet 1 mg    DISCONTD: LORazepam (ATIVAN) injection 1 mg    DISCONTD: LORazepam (ATIVAN) tablet 2 mg    DISCONTD: LORazepam (ATIVAN) injection 2 mg    DISCONTD: LORazepam (ATIVAN) tablet 3 mg    DISCONTD: LORazepam (ATIVAN) injection 3 mg    DISCONTD: LORazepam (ATIVAN) tablet 4 mg    DISCONTD: LORazepam (ATIVAN) injection 4 mg    0.9 % sodium chloride infusion     CONSULTS:  IP CONSULT TO SOCIAL WORK  IP CONSULT TO INTERNAL MEDICINE  IP CONSULT TO SOCIAL WORK    FINAL IMPRESSION      1. Hypomagnesemia    2. Alcohol withdrawal syndrome without complication ([facility]          DISPOSITION/PLAN   DISPOSITION Admitted 11/24/2022 12:27:52 PM      PATIENT REFERRED TO:  No follow-up provider specified. DISCHARGE MEDICATIONS:  New Prescriptions    No medications on file     The care is provided during an unprecedented national emergency due to the novel coronavirus, COVID 19.   Abimael Monroy DO Abimael Monroy DO  11/24/22 1321

## 2022-11-24 NOTE — H&P
[address]     HISTORY AND PHYSICAL EXAMINATION            Date:   11/24/2022  Patient name:  Soo Madrigal  Date of admission:  11/24/2022  9:38 AM  MRN:   [id number]  Account:  [de-identified]  YOB: 1980  PCP:    Frances Holman  Room:   02/02  Code Status:    Prior    Chief Complaint:     Chief Complaint   Patient presents with    Fall    Back Pain    Alcohol Problem     History Obtained From:     patient    History of Present Illness: The patient is a 36 y.o. Non- / non  female who presents withFall, Back Pain, and Alcohol Problem  and she is admitted to the hospital for the management of alcohol withdrawal with complications. Patient is a 44-year-old female past medical history significant for alcohol abuse, bipolar disorder, paroxysmal SVT, antiphospholipid antibody syndrome chronically maintained on Arixtra with prior history of PE and DVT presented to the ED on 11/24/2022 status post mechanical fall while drinking. Per patient, her last drink was yesterday around 6 PM.  Patient has been on a binge drinking episode for the past 4 days, 30 \"Fireball\" per day. Patient endorses being anxious/agitated accompanied with slight nausea and shakiness, patient denies tactile/auditory/visual disturbances. Patient alert and oriented x3. In ED, patient's vitals were unremarkable except tachycardia with heart rate of 125. Labs were significant for hyponatremia 130, hypomagnesemia 1.3, leukocytosis 14.2, and elevated initial troponin 40. Trauma work-up including CT head/CT cervical -thoracic-lumbar spine negative. X-ray pelvis negative. Portable chest x-ray showing no acute intrathoracic process.   She will be admitted to PCU for further management of alcohol withdrawal.    Past Medical History:     Past Medical History:   Diagnosis Date    Alcohol abuse     sober [id number]    Anxiety     Arthritis     Painting esophagus     Benzodiazepine overdose 9/26/2015    Bipolar disorder, unspecified ([address]     Bipolar I disorder, most recent episode depressed, severe without psychotic features ([address]     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 benzodiazepine     Pernicious anemia     Primary hypercoagulable state ([address]     antiphospholipid antibodies on Arixtra shots daily    Pulmonary embolism ([address] 2010    Suicidal behavior 12/14/2015    Suicidal ideation     Suicide attempt ([address] 2014    hx OD on painkillers and rubbing alcohol    SVT (supraventricular tachycardia) ([address] 04/07/2017    Toxic effect of ethanol, intentional self-harm ([facility] 12/16/2015    Type 2 diabetes mellitus, with long-term current use of insulin [facility])         Past SurgicalHistory:     Past Surgical History:   Procedure Laterality Date    ABDOMINAL HERNIA REPAIR  2014    wound vac    ABDOMINAL HERNIA REPAIR  11/3/14    BARIATRIC SURGERY  2013    [facility]    CHOLECYSTECTOMY      DILATION AND CURETTAGE OF UTERUS  2005    ENDOSCOPY, COLON, DIAGNOSTIC      EGD    FINGER AMPUTATION Left 02/09/2015    index and ring finger.  from [facility] total of 8    IVC FILTER INSERTION      LIVER RESECTION      for hepatic adenoma    LYMPH NODE BIOPSY  1990    JUSTINE AND BSO (CERVIX REMOVED)  2011    TONSILLECTOMY          Medications Prior to Admission:        Prior to Admission medications    Medication Sig Start Date End Date Taking? Authorizing Provider   FLUoxetine (PROZAC) 20 MG capsule Take 2 capsules by mouth daily 6/12/22   Lucien Almaguer MD   insulin glargine (LANTUS) 100 UNIT/ML injection vial Inject 50 Units into the skin daily 6/13/22   Lucien Almaguer MD   insulin lispro (HUMALOG) 100 UNIT/ML SOLN injection vial **Medium Dose Corrective Algorithm**  Glucose: Dose:  If <139             No Insulin  140-199 2 Units  200-249 4 Units  250-299 6 Units  300-349 8 Units  350-400 10 Units  Above 400       12 Units 6/12/22   Lucien Almaguer MD   metoprolol succinate (TOPROL XL) 25 MG extended release tablet Take 1 tablet by mouth daily 6/13/22   Lucien Almaguer MD   miconazole (MICOTIN) 2 % powder Apply topically 2 times daily. 6/12/22   Lucien Almaguer MD   folic acid (FOLVITE) 1 MG tablet Take 1 tablet by mouth daily 6/13/22   Lucien Almaguer MD   thiamine mononitrate (THIAMINE) 100 MG tablet Take 1 tablet by mouth daily 6/13/22   Lucien Almaguer MD   dilTIAZem (CARDIZEM CD) 360 MG extended release capsule Take 360 mg by mouth daily    Historical Provider, MD   losartan (COZAAR) 50 mg tablet Take 50 mg by mouth daily    Historical Provider, MD   HYDROcodone-acetaminophen (NORCO) 5-325 MG per tablet Take 1 tablet by mouth every 6 hours as needed for Pain.  Indications: Last filled 5/24/22 for 30 days    Historical Provider, MD   traZODone (DESYREL) 100 MG tablet Take 100 mg by mouth nightly as needed for Sleep  Patient not taking: Reported on 6/10/2022    Historical Provider, MD   famotidine (PEPCID) 20 MG tablet TAKE 1 TABLET BY MOUTH TWICE DAILY 4/22/21   Yony Michele MD   cyanocobalamin 1000 MCG/ML injection Inject 1 mL into the muscle every 7 days 2/13/21   Stacey Monroe MD   Syringe/Needle, Disp, (SYRINGE 3CC/25GX1\") 25G X 1\" 3 ML MISC To be used with B12 injections monthly 2/13/21   Stacey Monroe MD   FARXIGA 10 MG tablet TAKE 1 TABLET BY MOUTH EVERY MORNING 11/2/20   Stacey Monroe MD   pravastatin (PRAVACHOL) 40 MG tablet TAKE 1 TABLET(40 MG) BY MOUTH DAILY 6/26/20   Stacey Monroe MD   acetaminophen (TYLENOL) 500 MG tablet Take 2 tablets by mouth every 8 hours as needed for Pain 2/19/20   Lianna Rule, DO   glucose monitoring kit (FREESTYLE) monitoring kit Testing twice a day. Please dispense according to patients insurance. 12/20/19   Stacey Monroe MD   Insulin Pen Needle 31G X 5 MM MISC 1 each by Does not apply route daily 12/20/19   Stacey Monroe MD   Lancets 30G MISC Testing twice a day. Please dispense according to patients insurance. 12/20/19   Stacey Monroe MD   blood glucose monitor strips Testing twice a day. Please dispense according to patients insurance. 12/20/19   Stacey Monroe MD   Multiple Vitamins-Minerals (THERAPEUTIC MULTIVITAMIN-MINERALS) tablet Take 1 tablet by mouth daily    Historical Provider, MD   vitamin D (CHOLECALCIFEROL) 1000 UNIT TABS tablet Take 1,000 Units by mouth daily     Historical Provider, MD        Allergies:     Asa [aspirin], Betadine [povidone iodine], Celexa [citalopram hydrobromide], Citalopram, Lasix [furosemide], Macrobid [nitrofurantoin], Norco [hydrocodone-acetaminophen], Betadine [povidone iodine], Codeine, Lithium, Paroxetine, Paxil [paroxetine hcl], Tape [adhesive tape], and Tegretol [carbamazepine]    Social History:     Tobacco:    reports that she has never smoked. She has never used smokeless tobacco.  Alcohol:      reports no history of alcohol use. Drug Use:  reports no history of drug use.     Family History:     Family History   Problem Relation Age of Onset    Depression Mother     High Blood Pressure Mother     High Cholesterol Mother Diabetes Father     Heart Disease Father     Kidney Disease Father     High Blood Pressure Father     High Cholesterol Father     Cancer Maternal Uncle         of duodenum had whipple    Breast Cancer Maternal Aunt     Breast Cancer Maternal Cousin        Review of Systems:     Positive and Negative as described in HPI. Review of Systems   Constitutional:  Negative for chills and fever. Eyes: Negative. Respiratory:  Negative for cough, choking, chest tightness and shortness of breath. Cardiovascular:  Negative for chest pain, palpitations and leg swelling. Gastrointestinal:  Positive for nausea. Negative for abdominal pain and vomiting. Genitourinary: Negative. Musculoskeletal:  Positive for back pain and neck pain. Skin: Negative. Neurological:  Negative for dizziness, tremors, light-headedness and headaches. Psychiatric/Behavioral:  Positive for agitation. The patient is nervous/anxious. Physical Exam:   /89   Pulse (!) 125   Temp 98.1 °F (36.7 °C) (Oral)   Resp 20   Ht 5' 8\" (1.727 m)   Wt 260 lb (117.9 kg)   SpO2 98%   BMI 39.53 kg/m²   Temp (24hrs), Av.1 °F (36.7 °C), Min:98.1 °F (36.7 °C), Max:98.1 °F (36.7 °C)    No results for input(s): POCGLU in the last 72 hours. No intake or output data in the 24 hours ending 22 1317    Physical Exam  Constitutional:       Appearance: Normal appearance. HENT:      Head: Normocephalic and atraumatic. Eyes:      Extraocular Movements: Extraocular movements intact. Conjunctiva/sclera: Conjunctivae normal.   Cardiovascular:      Rate and Rhythm: Regular rhythm. Tachycardia present. Heart sounds: No murmur heard. No gallop. Pulmonary:      Effort: No respiratory distress. Breath sounds: No wheezing or rales. Abdominal:      General: Bowel sounds are normal. There is no distension. Palpations: Abdomen is soft. Tenderness: There is no abdominal tenderness.    Musculoskeletal:      Right lower leg: No edema. Left lower leg: No edema. Skin:     General: Skin is warm. Neurological:      Mental Status: She is alert and oriented to person, place, and time. Cranial Nerves: No cranial nerve deficit.       Gait: Gait normal.      Comments: Mild upper extremity tremors   Psychiatric:         Mood and Affect: Mood normal.         Behavior: Behavior normal.       Investigations:     Laboratory Testing:  Recent Results (from the past 24 hour(s))   Troponin    Collection Time: 11/24/22 11:15 AM   Result Value Ref Range    Troponin, High Sensitivity 40 (H) 0 - 14 ng/L   APTT    Collection Time: 11/24/22 11:15 AM   Result Value Ref Range    PTT 32.4 24.0 - 36.0 sec   Protime-INR    Collection Time: 11/24/22 11:15 AM   Result Value Ref Range    Protime 14.5 11.8 - 14.6 sec    INR 1.1    Phosphorus    Collection Time: 11/24/22 11:15 AM   Result Value Ref Range    Phosphorus 2.9 2.6 - 4.5 mg/dL   Magnesium    Collection Time: 11/24/22 11:15 AM   Result Value Ref Range    Magnesium 1.3 (L) 1.6 - 2.6 mg/dL   Basic Metabolic Panel    Collection Time: 11/24/22 11:15 AM   Result Value Ref Range    Glucose 390 (H) 70 - 99 mg/dL    BUN 16 6 - 20 mg/dL    Creatinine 0.84 0.50 - 0.90 mg/dL    Est, Glom Filt Rate >60 >60 mL/min/1.73m2    Calcium 8.2 (L) 8.6 - 10.4 mg/dL    Sodium 130 (L) 135 - 144 mmol/L    Potassium 4.4 3.7 - 5.3 mmol/L    Chloride 92 (L) 98 - 107 mmol/L    CO2 19 (L) 20 - 31 mmol/L    Anion Gap 19 (H) 9 - 17 mmol/L   CBC with Auto Differential    Collection Time: 11/24/22 11:15 AM   Result Value Ref Range    WBC 14.2 (H) 3.5 - 11.0 k/uL    RBC 4.74 4.0 - 5.2 m/uL    Hemoglobin 14.3 12.0 - 16.0 g/dL    Hematocrit 42.9 36 - 46 %    MCV 90.4 80 - 100 fL    MCH 30.0 26 - 34 pg    MCHC 33.2 31 - 37 g/dL    RDW 13.0 11.5 - 14.9 %    Platelets 627 354 - 095 k/uL    MPV 8.9 6.0 - 12.0 fL    Seg Neutrophils 88 (H) 36 - 66 %    Lymphocytes 8 (L) 24 - 44 %    Monocytes 4 1 - 7 %    Eosinophils % 0 0 - 4 % Basophils 0 0 - 2 %    Segs Absolute 12.49 (H) 1.3 - 9.1 k/uL    Absolute Lymph # 1.14 1.0 - 4.8 k/uL    Absolute Mono # 0.57 0.1 - 1.3 k/uL    Absolute Eos # 0.00 0.0 - 0.4 k/uL    Basophils Absolute 0.00 0.0 - 0.2 k/uL    Morphology Normal    Ethanol    Collection Time: 11/24/22 11:15 AM   Result Value Ref Range    Ethanol <10 <10 mg/dL    Ethanol percent <0.010 %   C-Reactive Protein    Collection Time: 11/24/22 11:15 AM   Result Value Ref Range    CRP <3.0 0.0 - 5.0 mg/L   Procalcitonin    Collection Time: 11/24/22 11:15 AM   Result Value Ref Range    Procalcitonin 0.06 <0.09 ng/mL       Imaging/Diagnostics:  XR PELVIS (1-2 VIEWS)    Result Date: 11/24/2022  EXAMINATION: ONE XRAY VIEW OF THE PELVIS 11/24/2022 9:51 am COMPARISON: 10/03/2020 HISTORY: ORDERING SYSTEM PROVIDED HISTORY: fall TECHNOLOGIST PROVIDED HISTORY: fall Reason for Exam: Fall last night FINDINGS: Pelvic bones are intact without evidence of acute fracture or displacement. No signs of acute fracture or dislocation at the bilateral hips. Degenerative changes noted in the visualized lower lumbar spine. No acute osseous abnormality in the pelvis. CT HEAD WO CONTRAST    Result Date: 11/24/2022  EXAMINATION: CT OF THE HEAD WITHOUT CONTRAST  11/24/2022 10:13 am TECHNIQUE: CT of the head was performed without the administration of intravenous contrast. Automated exposure control, iterative reconstruction, and/or weight based adjustment of the mA/kV was utilized to reduce the radiation dose to as low as reasonably achievable. COMPARISON: 12/01/2020 HISTORY: ORDERING SYSTEM PROVIDED HISTORY: fall TECHNOLOGIST PROVIDED HISTORY: fall Decision Support Exception - unselect if not a suspected or confirmed emergency medical condition->Emergency Medical Condition (MA) Is the patient pregnant?->No Reason for Exam: fall c/o pain to back FINDINGS: BRAIN/VENTRICLES: There is no acute intracranial hemorrhage, mass effect or midline shift.   No abnormal extra-axial fluid collection. The gray-white differentiation is maintained without evidence of an acute infarct. There is no evidence of hydrocephalus. ORBITS: The visualized portion of the orbits demonstrate no acute abnormality. SINUSES: The visualized paranasal sinuses and mastoid air cells demonstrate no acute abnormality. SOFT TISSUES/SKULL:  No acute abnormality of the visualized skull or soft tissues. No acute intracranial abnormality. CT CERVICAL SPINE WO CONTRAST    Result Date: 11/24/2022  EXAMINATION: CT OF THE CERVICAL SPINE WITHOUT CONTRAST 11/24/2022 10:13 am TECHNIQUE: CT of the cervical spine was performed without the administration of intravenous contrast. Multiplanar refo rmatted images are provided for review. Automated exposure control, iterative reconstruction, and/or weight based adjustment of the mA/kV was utilized to reduce the radiation dose to as low as reasonably achievable. COMPARISON: CT cervical spine dated 12/01/2020 HISTORY: ORDERING SYSTEM PROVIDED HISTORY: fall TECHNOLOGIST PROVIDED HISTORY: fall Decision Support Exception - unselect if not a suspected or confirmed emergency medical condition->Emergency Medical Condition (MA) Is the patient pregnant?->No Reason for Exam: fall c/o pain to back FINDINGS: BONES/ALIGNMENT: There is no acute fracture or traumatic malalignment. DEGENERATIVE CHANGES: No significant degenerative changes. SOFT TISSUES: There is no prevertebral soft tissue swelling. No acute abnormality of the cervical spine. CT THORACIC SPINE WO CONTRAST    Result Date: 11/24/2022  EXAMINATION: CT OF THE THORACIC SPINE WITHOUT CONTRAST  11/24/2022 10:13 am: TECHNIQUE: CT of the thoracic spine was performed without the administration of intravenous contrast. Multiplanar reformatted images are provided for review.  Automated exposure control, iterative reconstruction, and/or weight based adjustment of the mA/kV was utilized to reduce the radiation dose to as low as reasonably achievable. COMPARISON: CT chest dated 03/16/2022 HISTORY: ORDERING SYSTEM PROVIDED HISTORY: fall TECHNOLOGIST PROVIDED HISTORY: fall Is the patient pregnant?->No Reason for Exam: fall c/o pain to back FINDINGS: BONES/ALIGNMENT: There is normal alignment of the spine. The vertebral body heights are maintained. No osseous destructive lesion is seen. DEGENERATIVE CHANGES: No gross spinal canal stenosis or bony neural foraminal narrowing of the thoracic spine. SOFT TISSUES: No paraspinal mass is seen. Unremarkable CT of the thoracic spine. CT LUMBAR SPINE WO CONTRAST    Result Date: 11/24/2022  EXAMINATION: CT OF THE LUMBAR SPINE WITHOUT CONTRAST  11/24/2022 TECHNIQUE: CT of the lumbar spine was performed without the administration of intravenous contrast. Multiplanar reformatted images are provided for review. Adjustment of mA and/or kV according to patient size was utilized. Automated exposure control, iterative reconstruction, and/or weight based adjustment of the mA/kV was utilized to reduce the radiation dose to as low as reasonably achievable. COMPARISON: CT abdomen and pelvis dated 03/13/2022. HISTORY: ORDERING SYSTEM PROVIDED HISTORY: fall TECHNOLOGIST PROVIDED HISTORY: fall Decision Support Exception - unselect if not a suspected or confirmed emergency medical condition->Emergency Medical Condition (MA) Is the patient pregnant?->No Reason for Exam: fall c/o pain to back FINDINGS: BONES/ALIGNMENT: There is normal alignment of the spine. The vertebral body heights are maintained. No osseous destructive lesion is seen. DEGENERATIVE CHANGES: There is moderate disc space narrowing and endplate spurring at K6-U4, L4-L5, and L5-S1. Mild-to-moderate multilevel facet arthropathy. There is mild-to-moderate neural foraminal narrowing at L4-L5. Mild-to-moderate spinal canal narrowing at L1-L2 secondary to a left paracentral disc protrusion, which is unchanged.   No high-grade osseous encroachment on the spinal canal. SOFT TISSUES/RETROPERITONEUM: No paraspinal mass is seen. Evidence of previous gastric bypass surgery. Hepatic steatosis. IVC filter noted. 1.  No acute osseous abnormality in the lumbar spine. 2.  Mild-to-moderate degenerative changes. XR CHEST PORTABLE    Result Date: 11/24/2022  EXAMINATION: ONE XRAY VIEW OF THE CHEST 11/24/2022 9:51 am COMPARISON: 06/09/2022 HISTORY: ORDERING SYSTEM PROVIDED HISTORY: fall TECHNOLOGIST PROVIDED HISTORY: fall Reason for Exam: Fall last night FINDINGS: Cardiomediastinal silhouette and pulmonary vasculature are within normal limits. No focal airspace consolidation, pneumothorax, or pleural effusion. No free air beneath the diaphragm. No acute osseous abnormality. No acute intrathoracic process. Assessment :      Primary Problem  Alcohol withdrawal syndrome with complication [facility])    Active Hospital Problems    Diagnosis Date Noted    Alcohol withdrawal syndrome with complication [facility]) [U18.562] 11/24/2022     Priority: Medium       Plan:     Patient status Admit as inpatient in the  Progressive Unit/Step down    Alcohol withdrawal status post fall  -Patient's last drink 6 PM on 11/23/2022  -Early stage complicated with tachycardia, anxiety, and tremors   -Start patient on thiamine and folate  -WA protocol   -Trauma work-up including CT head/CT cervical -thoracic-lumbar spine negative.   X-ray pelvis negative  -UDS pending  -Blood ethanol level unremarkable  -Fall precautions  -Seizure precautions    Type 2 diabetes  -Last known A1c 6/9/2022: 7.6  -Resume patient's home Lantus 50 units daily  -Medium dose sliding scale  -Hypoglycemic protocol  -POCT glucose checks    Antiphospholipid antibody syndrome  -Continue patient home med/fondaparinux    Essential hypertension  -Continue home med Cardizem extended release 360 and metoprolol 25 daily      DVT prophylaxis: Lovenox 30 twice daily  GI prophylaxis: Pepcid  Diet: adult diabetic diet  CODE STATUS: full code    Consultations:   IP CONSULT TO SOCIAL WORK  IP CONSULT TO INTERNAL MEDICINE  IP CONSULT TO SOCIAL WORK    Patient is admitted as inpatient status because of co-morbiditieslisted above, severity of signs and symptoms as outlined, requirement for current medical therapies and most importantly because of direct risk to patient if care not provided in a hospital setting. Bo Lopez MD  11/24/2022  1:17 PM    Copy sent to Dr. Arian Hodges   Attending Physician Statement  I have discussed the care of Adra Liter and I have examined the patient myselft and taken ros and hpi , including pertinent history and exam findings,  with the resident. I have reviewed the key elements of all parts of the encounter with the resident. I agree with the assessment, plan and orders as documented by the resident. Spent 55 minutes in reviewing data/medicines/talking to patient/family,  explaining and answering all the questions.         Electronically signed by Lauren Negrete MD

## 2022-11-24 NOTE — PROGRESS NOTES
This nurse took over patient care at this time. Patient is A&Ox4, resting in chair comfortably, call light within reach, and no needs at this time.

## 2022-11-24 NOTE — ED NOTES
Report called to [facility].  Pt ready for transfer to the floor     Allan Ureña RN  11/24/22 [id number]

## 2022-11-24 NOTE — PLAN OF CARE
Problem: Discharge Planning  Goal: Discharge to home or other facility with appropriate resources  Outcome: Progressing  Flowsheets (Taken 11/24/2022 1445 by Roberta Ly RN)  Discharge to home or other facility with appropriate resources:   Arrange for needed discharge resources and transportation as appropriate   Identify barriers to discharge with patient and caregiver   Identify discharge learning needs (meds, wound care, etc)   Refer to discharge planning if patient needs post-hospital services based on physician order or complex needs related to functional status, cognitive ability or social support system     Problem: Pain  Goal: Verbalizes/displays adequate comfort level or baseline comfort level  Outcome: Progressing     Problem: Safety - Adult  Goal: Free from fall injury  Outcome: Progressing

## 2022-11-24 NOTE — PROGRESS NOTES
Pt arrived on unit @ 1415 via stretcher . Vitals taken, telemetry applied. Call light is within reach. Bed is braked, in the lowest position and bed alarm is on. RN notified.

## 2022-11-25 LAB
ABSOLUTE EOS #: 0 K/UL (ref 0–0.4)
ABSOLUTE LYMPH #: 1.7 K/UL (ref 1–4.8)
ABSOLUTE MONO #: 0.5 K/UL (ref 0.1–1.3)
ANION GAP SERPL CALCULATED.3IONS-SCNC: 9 MMOL/L (ref 9–17)
BASOPHILS # BLD: 0 % (ref 0–2)
BASOPHILS ABSOLUTE: 0 K/UL (ref 0–0.2)
BUN BLDV-MCNC: 9 MG/DL (ref 6–20)
CALCIUM SERPL-MCNC: 8.2 MG/DL (ref 8.6–10.4)
CHLORIDE BLD-SCNC: 100 MMOL/L (ref 98–107)
CO2: 27 MMOL/L (ref 20–31)
CREAT SERPL-MCNC: 0.68 MG/DL (ref 0.5–0.9)
EOSINOPHILS RELATIVE PERCENT: 1 % (ref 0–4)
FOLATE: 16.1 NG/ML
GFR SERPL CREATININE-BSD FRML MDRD: >60 ML/MIN/1.73M2
GLUCOSE BLD-MCNC: 104 MG/DL (ref 65–105)
GLUCOSE BLD-MCNC: 122 MG/DL (ref 65–105)
GLUCOSE BLD-MCNC: 154 MG/DL (ref 65–105)
GLUCOSE BLD-MCNC: 39 MG/DL (ref 65–105)
GLUCOSE BLD-MCNC: 57 MG/DL (ref 65–105)
GLUCOSE BLD-MCNC: 60 MG/DL (ref 65–105)
GLUCOSE BLD-MCNC: 63 MG/DL (ref 65–105)
GLUCOSE BLD-MCNC: 91 MG/DL (ref 70–99)
GLUCOSE BLD-MCNC: 96 MG/DL (ref 65–105)
HCT VFR BLD CALC: 40.3 % (ref 36–46)
HEMOGLOBIN: 13.2 G/DL (ref 12–16)
LYMPHOCYTES # BLD: 20 % (ref 24–44)
MAGNESIUM: 1.9 MG/DL (ref 1.6–2.6)
MCH RBC QN AUTO: 29.9 PG (ref 26–34)
MCHC RBC AUTO-ENTMCNC: 32.7 G/DL (ref 31–37)
MCV RBC AUTO: 91.6 FL (ref 80–100)
MONOCYTES # BLD: 6 % (ref 1–7)
PDW BLD-RTO: 12.7 % (ref 11.5–14.9)
PLATELET # BLD: 249 K/UL (ref 150–450)
PMV BLD AUTO: 8.5 FL (ref 6–12)
POTASSIUM SERPL-SCNC: 3.6 MMOL/L (ref 3.7–5.3)
RBC # BLD: 4.4 M/UL (ref 4–5.2)
SEG NEUTROPHILS: 73 % (ref 36–66)
SEGMENTED NEUTROPHILS ABSOLUTE COUNT: 6.4 K/UL (ref 1.3–9.1)
SODIUM BLD-SCNC: 136 MMOL/L (ref 135–144)
TSH SERPL DL<=0.05 MIU/L-ACNC: 1.79 UIU/ML (ref 0.3–5)
VITAMIN B-12: 352 PG/ML (ref 232–1245)
WBC # BLD: 8.7 K/UL (ref 3.5–11)

## 2022-11-25 PROCEDURE — 82746 ASSAY OF FOLIC ACID SERUM: CPT

## 2022-11-25 PROCEDURE — 6370000000 HC RX 637 (ALT 250 FOR IP): Performed by: EMERGENCY MEDICINE

## 2022-11-25 PROCEDURE — 6370000000 HC RX 637 (ALT 250 FOR IP)

## 2022-11-25 PROCEDURE — 6360000002 HC RX W HCPCS

## 2022-11-25 PROCEDURE — 83735 ASSAY OF MAGNESIUM: CPT

## 2022-11-25 PROCEDURE — 2060000000 HC ICU INTERMEDIATE R&B

## 2022-11-25 PROCEDURE — 36415 COLL VENOUS BLD VENIPUNCTURE: CPT

## 2022-11-25 PROCEDURE — 85025 COMPLETE CBC W/AUTO DIFF WBC: CPT

## 2022-11-25 PROCEDURE — 84443 ASSAY THYROID STIM HORMONE: CPT

## 2022-11-25 PROCEDURE — 82607 VITAMIN B-12: CPT

## 2022-11-25 PROCEDURE — 80048 BASIC METABOLIC PNL TOTAL CA: CPT

## 2022-11-25 PROCEDURE — 82947 ASSAY GLUCOSE BLOOD QUANT: CPT

## 2022-11-25 PROCEDURE — 99232 SBSQ HOSP IP/OBS MODERATE 35: CPT | Performed by: INTERNAL MEDICINE

## 2022-11-25 PROCEDURE — 2580000003 HC RX 258

## 2022-11-25 RX ORDER — METHOCARBAMOL 750 MG/1
1500 TABLET, FILM COATED ORAL 3 TIMES DAILY
Status: DISCONTINUED | OUTPATIENT
Start: 2022-11-25 | End: 2022-11-26 | Stop reason: HOSPADM

## 2022-11-25 RX ORDER — FONDAPARINUX SODIUM 10 MG/.8ML
10 INJECTION SUBCUTANEOUS DAILY
COMMUNITY
Start: 2022-11-10

## 2022-11-25 RX ORDER — INSULIN GLARGINE 100 [IU]/ML
20 INJECTION, SOLUTION SUBCUTANEOUS DAILY
Status: DISCONTINUED | OUTPATIENT
Start: 2022-11-26 | End: 2022-11-26 | Stop reason: HOSPADM

## 2022-11-25 RX ORDER — ZINC OXIDE 20 %
OINTMENT (GRAM) TOPICAL 4 TIMES DAILY PRN
Status: DISCONTINUED | OUTPATIENT
Start: 2022-11-25 | End: 2022-11-26 | Stop reason: HOSPADM

## 2022-11-25 RX ORDER — FONDAPARINUX SODIUM 10 MG/.8ML
10 INJECTION SUBCUTANEOUS DAILY
Status: DISCONTINUED | OUTPATIENT
Start: 2022-11-25 | End: 2022-11-26 | Stop reason: HOSPADM

## 2022-11-25 RX ADMIN — FOLIC ACID 1 MG: 1 TABLET ORAL at 09:43

## 2022-11-25 RX ADMIN — METOPROLOL SUCCINATE 25 MG: 25 TABLET, EXTENDED RELEASE ORAL at 09:43

## 2022-11-25 RX ADMIN — LOSARTAN POTASSIUM 50 MG: 50 TABLET, FILM COATED ORAL at 09:43

## 2022-11-25 RX ADMIN — THIAMINE HCL TAB 100 MG 100 MG: 100 TAB at 09:43

## 2022-11-25 RX ADMIN — PRAVASTATIN SODIUM 40 MG: 40 TABLET ORAL at 20:26

## 2022-11-25 RX ADMIN — LORAZEPAM 1 MG: 1 TABLET ORAL at 22:49

## 2022-11-25 RX ADMIN — LORAZEPAM 1 MG: 1 TABLET ORAL at 15:12

## 2022-11-25 RX ADMIN — METHOCARBAMOL 1500 MG: 750 TABLET ORAL at 20:38

## 2022-11-25 RX ADMIN — LORAZEPAM 1 MG: 1 TABLET ORAL at 04:21

## 2022-11-25 RX ADMIN — FONDAPARINUX SODIUM 10 MG: 10 INJECTION, SOLUTION SUBCUTANEOUS at 09:51

## 2022-11-25 RX ADMIN — FAMOTIDINE 20 MG: 20 TABLET, FILM COATED ORAL at 20:26

## 2022-11-25 RX ADMIN — Medication 16 G: at 05:46

## 2022-11-25 RX ADMIN — LORAZEPAM 1 MG: 1 TABLET ORAL at 13:08

## 2022-11-25 RX ADMIN — FAMOTIDINE 20 MG: 20 TABLET, FILM COATED ORAL at 09:43

## 2022-11-25 RX ADMIN — SODIUM CHLORIDE: 9 INJECTION, SOLUTION INTRAVENOUS at 00:30

## 2022-11-25 RX ADMIN — LORAZEPAM 1 MG: 1 TABLET ORAL at 20:26

## 2022-11-25 RX ADMIN — METHOCARBAMOL 1500 MG: 750 TABLET ORAL at 15:10

## 2022-11-25 RX ADMIN — DILTIAZEM HYDROCHLORIDE 360 MG: 180 CAPSULE, COATED, EXTENDED RELEASE ORAL at 09:43

## 2022-11-25 RX ADMIN — LORAZEPAM 1 MG: 1 TABLET ORAL at 09:43

## 2022-11-25 RX ADMIN — FLUOXETINE 40 MG: 20 CAPSULE ORAL at 09:43

## 2022-11-25 RX ADMIN — SODIUM CHLORIDE: 9 INJECTION, SOLUTION INTRAVENOUS at 21:23

## 2022-11-25 RX ADMIN — LORAZEPAM 1 MG: 1 TABLET ORAL at 18:31

## 2022-11-25 ASSESSMENT — PAIN DESCRIPTION - LOCATION
LOCATION: BACK;HEAD
LOCATION: BACK;NECK

## 2022-11-25 ASSESSMENT — PAIN SCALES - GENERAL
PAINLEVEL_OUTOF10: 6
PAINLEVEL_OUTOF10: 7

## 2022-11-25 ASSESSMENT — PAIN DESCRIPTION - DESCRIPTORS: DESCRIPTORS: DISCOMFORT

## 2022-11-25 NOTE — PROGRESS NOTES
[id number]    PROGRESS NOTE             11/25/2022    10:28 AM    Name:   Dariusz Loja  MRN:     [id number]     Acct:      [de-identified]   Room:   [facility] [place] [address] Day:  1  Admit Date:  11/24/2022  9:38 AM    PCP:  Bernardino Gaytan  Code Status:  Full Code    Subjective:     C/C:   Chief Complaint   Patient presents with    Fall    Back Pain    Alcohol Problem     Interval History Status: improved. Patient seen and examined at bedside. Patient's blood sugar dropped to 39 -Lantus on hold for today will resume Lantus 20 daily starting tomorrow. Patient endorses neck and back pain. No other concerns. Brief History:     The patient is a 36 y.o. Non- / non  female who presents withFall, Back Pain, and Alcohol Problem  and she is admitted to the hospital for the management of alcohol withdrawal with complications. Patient is a 76-year-old female past medical history significant for alcohol abuse, bipolar disorder, paroxysmal SVT, antiphospholipid antibody syndrome chronically maintained on Arixtra with prior history of PE and DVT presented to the ED on 11/24/2022 status post mechanical fall while drinking. Per patient, her last drink was yesterday around 6 PM.  Patient has been on a binge drinking episode for the past 4 days, 30 \"Fireball\" per day. Patient endorses being anxious/agitated accompanied with slight nausea and shakiness, patient denies tactile/auditory/visual disturbances. Patient alert and oriented x3. In ED, patient's vitals were unremarkable except tachycardia with heart rate of 125. Labs were significant for hyponatremia 130, hypomagnesemia 1.3, leukocytosis 14.2, and elevated initial troponin 40. Trauma work-up including CT head/CT cervical -thoracic-lumbar spine negative. X-ray pelvis negative. Portable chest x-ray showing no acute intrathoracic process.   She will be admitted to PCU for further management of alcohol withdrawal.    Review of Systems:     Review of Systems   Constitutional:  Negative for chills and fever. Eyes: Negative. Respiratory:  Negative for cough, choking, chest tightness and shortness of breath. Cardiovascular:  Negative for chest pain, palpitations and leg swelling. Gastrointestinal:  Positive for nausea. Negative for abdominal pain and vomiting. Genitourinary: Negative. Musculoskeletal:  Positive for back pain and neck pain. Skin: Negative. Neurological:  Negative for dizziness, tremors, light-headedness and headaches. Psychiatric/Behavioral:  Positive for agitation. The patient is nervous/anxious. Medications: Allergies:     Allergies   Allergen Reactions    Asa [Aspirin]      Patient is on chronic anticoagulation    Betadine [Povidone Iodine]     Celexa [Citalopram Hydrobromide]     Citalopram     Lasix [Furosemide]      Tolerates Bumex    Macrobid [Nitrofurantoin]     Norco [Hydrocodone-Acetaminophen] Hives    Betadine [Povidone Iodine] Rash    Codeine Rash    Lithium Nausea And Vomiting    Paroxetine Rash    Paxil [Paroxetine Hcl] Rash    Tape [Adhesive Tape] Rash     Paper tape    Tegretol [Carbamazepine] Rash       Current Meds:   Scheduled Meds:    fondaparinux  10 mg SubCUTAneous Daily    [START ON 11/26/2022] insulin glargine  20 Units SubCUTAneous Daily    methocarbamol  1,500 mg Oral TID    sodium chloride flush  5-40 mL IntraVENous 2 times per day    thiamine  100 mg Oral Daily    dilTIAZem  360 mg Oral Daily    FLUoxetine  40 mg Oral Daily    folic acid  1 mg Oral Daily    losartan  50 mg Oral Daily    metoprolol succinate  25 mg Oral Daily    pravastatin  40 mg Oral Nightly    insulin lispro  0-8 Units SubCUTAneous TID     insulin lispro  0-4 Units SubCUTAneous Nightly    sodium chloride flush  5-40 mL IntraVENous 2 times per day    famotidine  20 mg Oral BID     Continuous Infusions:    sodium chloride      dextrose sodium chloride      sodium chloride 100 mL/hr at 11/25/22 0636     PRN Meds: zinc oxide, sodium chloride flush, sodium chloride, glucose, dextrose bolus **OR** dextrose bolus, glucagon (rDNA), dextrose, sodium chloride flush, sodium chloride, polyethylene glycol, acetaminophen **OR** acetaminophen, potassium chloride **OR** potassium alternative oral replacement **OR** potassium chloride, magnesium sulfate, ondansetron **OR** ondansetron, LORazepam **OR** LORazepam **OR** LORazepam **OR** LORazepam **OR** LORazepam **OR** LORazepam **OR** LORazepam **OR** LORazepam    Data:     Past Medical History:   has a past medical history of Alcohol abuse, Anxiety, Arthritis, Painting esophagus, Benzodiazepine overdose, Bipolar disorder, unspecified ([facility] Bipolar I disorder, most recent episode depressed, severe without psychotic features ([facility] Cellulitis, Chronic abdominal wound infection, Constipation, Depression, Drug overdose, intentional ([address] Genital herpes, unspecified, GERD (gastroesophageal reflux disease), History of pulmonary embolism, Hx of blood clots, Hypertension, Iron deficiency anemia, Isopropyl alcohol poisoning, Lumbosacral spondylosis without myelopathy, MDRO (multiple drug resistant organisms) resistance, Miscarriage, Morbid obesity ([address] MRSA (methicillin resistant staph aureus) culture positive, Overdose of benzodiazepine, Pernicious anemia, Primary hypercoagulable state ([address] Pulmonary embolism ([facility] Suicidal behavior, Suicidal ideation, Suicide attempt ([facility] SVT (supraventricular tachycardia) ([facility] Toxic effect of ethanol, intentional self-harm ([facility] and Type 2 diabetes mellitus, with long-term current use of insulin ([facility] Social History:   reports that she has never smoked. She has never used smokeless tobacco. She reports that she does not drink alcohol and does not use drugs.      Family History:   Family History   Problem Relation Age of Onset    Depression Mother     High Blood Pressure Mother     High Cholesterol Mother     Diabetes Father     Heart Disease Father     Kidney Disease Father     High Blood Pressure Father     High Cholesterol Father     Cancer Maternal Uncle         of duodenum had whipple    Breast Cancer Maternal Aunt     Breast Cancer Maternal Cousin        Vitals:  /61   Pulse 84   Temp 98.3 °F (36.8 °C) (Oral)   Resp 18   Ht 5' 8\" (1.727 m)   Wt 270 lb 1 oz (122.5 kg)   SpO2 97%   BMI 41.06 kg/m²   Temp (24hrs), Av.3 °F (36.8 °C), Min:98.2 °F (36.8 °C), Max:98.6 °F (37 °C)    Recent Labs     22  0101 22  0129 22  0543 22  0606   POCGLU 60* 104 39* 96       I/O(24Hr): Intake/Output Summary (Last 24 hours) at 2022 1028  Last data filed at 2022 0636  Gross per 24 hour   Intake 2185.14 ml   Output --   Net 2185.14 ml       Labs:  [unfilled]    Lab Results   Component Value Date/Time    SPECIAL NOT REPORTED 2019 07:52 PM     Lab Results   Component Value Date/Time    CULTURE NO GROWTH 12 HOURS 2022 02:13 PM       [unfilled]    Radiology:    XR PELVIS (1-2 VIEWS)    Result Date: 2022  EXAMINATION: ONE XRAY VIEW OF THE PELVIS 2022 9:51 am COMPARISON: 10/03/2020 HISTORY: ORDERING SYSTEM PROVIDED HISTORY: fall TECHNOLOGIST PROVIDED HISTORY: fall Reason for Exam: Fall last night FINDINGS: Pelvic bones are intact without evidence of acute fracture or displacement. No signs of acute fracture or dislocation at the bilateral hips. Degenerative changes noted in the visualized lower lumbar spine. No acute osseous abnormality in the pelvis.      CT HEAD WO CONTRAST    Result Date: 2022  EXAMINATION: CT OF THE HEAD WITHOUT CONTRAST  2022 10:13 am TECHNIQUE: CT of the head was performed without the administration of intravenous contrast. Automated exposure control, iterative reconstruction, and/or weight based adjustment of the mA/kV was utilized to reduce the radiation dose to as low as reasonably achievable. COMPARISON: 12/01/2020 HISTORY: ORDERING SYSTEM PROVIDED HISTORY: fall TECHNOLOGIST PROVIDED HISTORY: fall Decision Support Exception - unselect if not a suspected or confirmed emergency medical condition->Emergency Medical Condition (MA) Is the patient pregnant?->No Reason for Exam: fall c/o pain to back FINDINGS: BRAIN/VENTRICLES: There is no acute intracranial hemorrhage, mass effect or midline shift. No abnormal extra-axial fluid collection. The gray-white differentiation is maintained without evidence of an acute infarct. There is no evidence of hydrocephalus. ORBITS: The visualized portion of the orbits demonstrate no acute abnormality. SINUSES: The visualized paranasal sinuses and mastoid air cells demonstrate no acute abnormality. SOFT TISSUES/SKULL:  No acute abnormality of the visualized skull or soft tissues. No acute intracranial abnormality. CT CERVICAL SPINE WO CONTRAST    Result Date: 11/24/2022  EXAMINATION: CT OF THE CERVICAL SPINE WITHOUT CONTRAST 11/24/2022 10:13 am TECHNIQUE: CT of the cervical spine was performed without the administration of intravenous contrast. Multiplanar refo rmatted images are provided for review. Automated exposure control, iterative reconstruction, and/or weight based adjustment of the mA/kV was utilized to reduce the radiation dose to as low as reasonably achievable. COMPARISON: CT cervical spine dated 12/01/2020 HISTORY: ORDERING SYSTEM PROVIDED HISTORY: fall TECHNOLOGIST PROVIDED HISTORY: fall Decision Support Exception - unselect if not a suspected or confirmed emergency medical condition->Emergency Medical Condition (MA) Is the patient pregnant?->No Reason for Exam: fall c/o pain to back FINDINGS: BONES/ALIGNMENT: There is no acute fracture or traumatic malalignment. DEGENERATIVE CHANGES: No significant degenerative changes. SOFT TISSUES: There is no prevertebral soft tissue swelling. No acute abnormality of the cervical spine. CT THORACIC SPINE WO CONTRAST    Result Date: 11/24/2022  EXAMINATION: CT OF THE THORACIC SPINE WITHOUT CONTRAST  11/24/2022 10:13 am: TECHNIQUE: CT of the thoracic spine was performed without the administration of intravenous contrast. Multiplanar reformatted images are provided for review. Automated exposure control, iterative reconstruction, and/or weight based adjustment of the mA/kV was utilized to reduce the radiation dose to as low as reasonably achievable. COMPARISON: CT chest dated 03/16/2022 HISTORY: ORDERING SYSTEM PROVIDED HISTORY: fall TECHNOLOGIST PROVIDED HISTORY: fall Is the patient pregnant?->No Reason for Exam: fall c/o pain to back FINDINGS: BONES/ALIGNMENT: There is normal alignment of the spine. The vertebral body heights are maintained. No osseous destructive lesion is seen. DEGENERATIVE CHANGES: No gross spinal canal stenosis or bony neural foraminal narrowing of the thoracic spine. SOFT TISSUES: No paraspinal mass is seen. Unremarkable CT of the thoracic spine. CT LUMBAR SPINE WO CONTRAST    Result Date: 11/24/2022  EXAMINATION: CT OF THE LUMBAR SPINE WITHOUT CONTRAST  11/24/2022 TECHNIQUE: CT of the lumbar spine was performed without the administration of intravenous contrast. Multiplanar reformatted images are provided for review. Adjustment of mA and/or kV according to patient size was utilized. Automated exposure control, iterative reconstruction, and/or weight based adjustment of the mA/kV was utilized to reduce the radiation dose to as low as reasonably achievable. COMPARISON: CT abdomen and pelvis dated 03/13/2022. HISTORY: ORDERING SYSTEM PROVIDED HISTORY: fall TECHNOLOGIST PROVIDED HISTORY: fall Decision Support Exception - unselect if not a suspected or confirmed emergency medical condition->Emergency Medical Condition (MA) Is the patient pregnant?->No Reason for Exam: fall c/o pain to back FINDINGS: BONES/ALIGNMENT: There is normal alignment of the spine.  The vertebral body heights are maintained. No osseous destructive lesion is seen. DEGENERATIVE CHANGES: There is moderate disc space narrowing and endplate spurring at Z9-G9, L4-L5, and L5-S1. Mild-to-moderate multilevel facet arthropathy. There is mild-to-moderate neural foraminal narrowing at L4-L5. Mild-to-moderate spinal canal narrowing at L1-L2 secondary to a left paracentral disc protrusion, which is unchanged. No high-grade osseous encroachment on the spinal canal. SOFT TISSUES/RETROPERITONEUM: No paraspinal mass is seen. Evidence of previous gastric bypass surgery. Hepatic steatosis. IVC filter noted. 1.  No acute osseous abnormality in the lumbar spine. 2.  Mild-to-moderate degenerative changes. XR CHEST PORTABLE    Result Date: 11/24/2022  EXAMINATION: ONE XRAY VIEW OF THE CHEST 11/24/2022 9:51 am COMPARISON: 06/09/2022 HISTORY: ORDERING SYSTEM PROVIDED HISTORY: fall TECHNOLOGIST PROVIDED HISTORY: fall Reason for Exam: Fall last night FINDINGS: Cardiomediastinal silhouette and pulmonary vasculature are within normal limits. No focal airspace consolidation, pneumothorax, or pleural effusion. No free air beneath the diaphragm. No acute osseous abnormality. No acute intrathoracic process. Physical Examination:        Physical Exam  Constitutional:       Appearance: Normal appearance. HENT:      Head: Normocephalic and atraumatic. Eyes:      Extraocular Movements: Extraocular movements intact. Conjunctiva/sclera: Conjunctivae normal.   Cardiovascular:      Rate and Rhythm: Regular rate and rhythm. Heart sounds: No murmur heard. No gallop. Pulmonary:      Effort: No respiratory distress. Breath sounds: No wheezing or rales. Abdominal:      General: Bowel sounds are normal. There is no distension. Palpations: Abdomen is soft. Tenderness: There is no abdominal tenderness. Musculoskeletal:      Right lower leg: No edema. Left lower leg: No edema.    Skin: General: Skin is warm. Neurological:      Mental Status: She is alert and oriented to person, place, and time. Cranial Nerves: No cranial nerve deficit. Gait: Gait normal.      Comments: Mild upper extremity tremors   Psychiatric:         Mood and Affect: Mood normal.         Behavior: Behavior normal.       Assessment:        Primary Problem  Alcohol withdrawal syndrome with complication [facility])    Active Hospital Problems    Diagnosis Date Noted    Alcohol withdrawal syndrome with complication [facility]) [F39.345] 11/24/2022     Priority: Medium       Plan:        Alcohol withdrawal status post fall  -Patient's last drink 6 PM on 11/23/2022  -Early stage complicated with tachycardia, anxiety, and tremors   -Start patient on thiamine and folate  -Vitamin B12 and folate within normal limits  -CIWA protocol   -Trauma work-up including CT head/CT cervical -thoracic-lumbar spine negative. X-ray pelvis negative  -UDS pending  -Blood ethanol level unremarkable  -Fall precautions  -Seizure precautions  -Robaxin 3 times daily for neck/back pain     Type 2 diabetes  -Patient's blood glucose dropped to 39 this morning  -Last known A1c 6/9/2022: 7.6  -Patient's Lantus on hold for today will resume Lantus 20 starting 11/26/2022  -Medium dose sliding scale  -Hypoglycemic protocol  -POCT glucose checks     Antiphospholipid antibody syndrome  -Patient has prior history of PE/DVT  -Continue patient home med/fondaparinux     Essential hypertension  -Continue home med Cardizem extended release 360 and metoprolol 25 daily     DVT prophylaxis: Fondaparinux  GI prophylaxis: Pepcid  Diet: adult diabetic diet  CODE STATUS: full code    Annelise Heranndez MD  11/25/2022  10:28 AM      Attending Physician Statement  I have discussed the care of Donta Sanchez and I have examined the patient myselft and taken ros and hpi , including pertinent history and exam findings,  with the resident.  I have reviewed the key elements of all parts of the encounter with the resident. I agree with the assessment, plan and orders as documented by the resident. Spent 35 minutes in reviewing data/medicines/talking to patient/family,  explaining and answering all the questions.     Severe hypoglycemia, lantus reduced,   Monitor sugars       Electronically signed by Reed Howard MD

## 2022-11-25 NOTE — PLAN OF CARE
Problem: Discharge Planning  Goal: Discharge to home or other facility with appropriate resources  Outcome: Progressing  Flowsheets (Taken 11/25/2022 1715)  Discharge to home or other facility with appropriate resources:   Identify barriers to discharge with patient and caregiver   Arrange for needed discharge resources and transportation as appropriate   Refer to discharge planning if patient needs post-hospital services based on physician order or complex needs related to functional status, cognitive ability or social support system     Problem: Pain  Goal: Verbalizes/displays adequate comfort level or baseline comfort level  Outcome: Progressing  Flowsheets (Taken 11/25/2022 1715)  Verbalizes/displays adequate comfort level or baseline comfort level:   Encourage patient to monitor pain and request assistance   Assess pain using appropriate pain scale   Implement non-pharmacological measures as appropriate and evaluate response     Problem: Safety - Adult  Goal: Free from fall injury  Outcome: Progressing  Flowsheets (Taken 11/25/2022 1715)  Free From Fall Injury:   Based on caregiver fall risk screen, instruct family/caregiver to ask for assistance with transferring infant if caregiver noted to have fall risk factors   Instruct family/caregiver on patient safety     Problem: Chronic Conditions and Co-morbidities  Goal: Patient's chronic conditions and co-morbidity symptoms are monitored and maintained or improved  Outcome: Progressing  Flowsheets (Taken 11/25/2022 1715)  Care Plan - Patient's Chronic Conditions and Co-Morbidity Symptoms are Monitored and Maintained or Improved: Monitor and assess patient's chronic conditions and comorbid symptoms for stability, deterioration, or improvement

## 2022-11-25 NOTE — PLAN OF CARE
Problem: Discharge Planning  Goal: Discharge to home or other facility with appropriate resources  11/25/2022 0310 by Walker Jade RN  Outcome: Progressing     Problem: Pain  Goal: Verbalizes/displays adequate comfort level or baseline comfort level  11/25/2022 0310 by aWlker Jade RN  Outcome: Progressing     Problem: Safety - Adult  Goal: Free from fall injury  11/25/2022 0310 by Walker Jade RN  Outcome: Progressing

## 2022-11-25 NOTE — CARE COORDINATION
CASE MANAGEMENT NOTE:    Admission Date:  11/24/2022 Jesenia Ferguson is a 36 y.o.  female    Admitted for : Hypomagnesemia [E83.42]  [facility] [G64.090]  Alcohol withdrawal syndrome without complication ([facility] [E42.105]    Met with:  Patient     PCP:  Troy Collins                                 Insurance:  Medicare       Is patient alert and oriented at time of discussion:  Yes     Current Residence/ Living Arrangements:  independently at home w/  [facility]             Current Services PTA:  Yes, Follows at [facility] on [address]     Does patient go to outpatient dialysis: No  If yes, location and chair time: NA     Is patient agreeable to VNS: No     Freedom of choice provided:  No     List of [address] provided: No     VNS chosen:  No     DME:  Glucometer/supplies     Home Oxygen: No     Nebulizer: No     CPAP/BIPAP: No     Supplier: N/A     Potential Assistance Needed: ? BHI, Psych to see     SNF needed: No     Freedom of choice and list provided: NA     Pharmacy:  [facility]        Is patient currently receiving oral anticoagulation therapy? No     Is the Patient an LAKISHA LING [facility] with Readmission Risk Score greater than 14%? No  If yes, pt needs a follow up appointment made within 7 days. Family Members/Caregivers that pt would like involved in their care:    Yes     If yes, list name here:  Mom, [facility]     Transportation Provider:  Patient              Discharge Plan:  11/25/22 Medicare Pt. Lives in Pending sale to [facility] w/ . DME- Glucometer/supplies. Denies VNS. Current w/ [facility] on [address] Will follow for any needs  IMM done 11/25 @ 1405 //tv                  Electronically signed by:  Travis Wolfe RN on 11/25/2022 at 2:37 PM

## 2022-11-25 NOTE — CARE COORDINATION
Patient reported that in 2013 she went through the [place] program, and she felt as the program was beneficial.     Patient is linked in the community with various resources. Patient currently attends alcohol anonymous at [facility] on Thursday evenings. Patient is not interested in going to inpatient alcohol treatment. Patient reported that on Wednesday night she \"drank 30 fireball shots\". Patient reports no triggers for her binge drinking. \"No rhyme, no reason\". Patient was 5 months sober before her binge episode. Patient has therapy appointments at [facility] once every two weeks.      Electronically signed by RIOS Chery on 11/25/2022 at 2:40 PM

## 2022-11-25 NOTE — PROGRESS NOTES
Comprehensive Nutrition Assessment    Type and Reason for Visit:  Initial, Positive Nutrition Screen (wt loss, poor appetite)    Nutrition Recommendations/Plan:   Will continue to provide 4 carbohydrate choices per tray and add Ensure High Protein once daily     Malnutrition Assessment:  Malnutrition Status: At risk for malnutrition (Comment) (11/25/22 7447)    Context:  Chronic Illness     Findings of the 6 clinical characteristics of malnutrition:  Energy Intake:  Mild decrease in energy intake (Comment)  Weight Loss:  Mild weight loss (specify amount and time period) (11% in 8 months)     Body Fat Loss:  No significant body fat loss     Muscle Mass Loss:  No significant muscle mass loss    Fluid Accumulation:  No significant fluid accumulation     Strength:  Not Performed    Nutrition Assessment:    Pt was admitted for Fall/Back Pain/ ETOH WD. Review of wt history shows 24# wt loss over the past 8 months. Pt consumed more than 75% of food provided at lunch today. Nutrition Related Findings:    no edema, labs: Glu 91, Med: Reviewed, PMH: DM, Bariatric Surgery, Liver Resection for Ca Wound Type: None       Current Nutrition Intake & Therapies:    Average Meal Intake: %     ADULT DIET; Regular; 4 carb choices (60 gm/meal)    Anthropometric Measures:  Height: 5' 8\" (172.7 cm)  Ideal Body Weight (IBW): 140 lbs (64 kg)    Admission Body Weight: 270 lb (122.5 kg)  Current Body Weight: 270 lb (122.5 kg),   IBW.  Weight Source: Bed Scale  Current BMI (kg/m2): 41.1  Usual Body Weight: 306 lb (138.8 kg)  % Weight Change (Calculated): -11.8                    BMI Categories: Obese Class 3 (BMI 40.0 or greater)    Estimated Daily Nutrient Needs:  Energy Requirements Based On: Formula  Weight Used for Energy Requirements: Ideal  Energy (kcal/day): [place] x 1-1.2= 6699-3051 kcal  Weight Used for Protein Requirements: Admission  Protein (g/day): 120 g (1g/kg)       Nutrition Diagnosis:   Predicted inadequate energy intake related to  (current medical condition) as evidenced by poor intake prior to admission    Nutrition Interventions:   Food and/or Nutrient Delivery: Continue Current Diet, Start Oral Nutrition Supplement  Nutrition Education/Counseling: No recommendation at this time  Coordination of Nutrition Care: Continue to monitor while inpatient       Goals:     Goals: PO intake 75% or greater       Nutrition Monitoring and Evaluation:      Food/Nutrient Intake Outcomes: Food and Nutrient Intake, Supplement Intake  Physical Signs/Symptoms Outcomes: Biochemical Data, GI Status, Fluid Status or Edema, Skin, Weight    Discharge Planning:    Continue current diet     Dennys Saldivar, [address] [place] [address], LD  Contact: [phone]

## 2022-11-25 NOTE — PROGRESS NOTES
PT shared her medical history and her reason to enduring her pain is for her 21 y.o. daughter. Pt sates that she lost baby for five times and this daughter is juan carlos to her. PT welcomed prayer. 11/25/22 5195   Encounter Summary   Encounter Overview/Reason  Spiritual/Emotional Needs   Service Provided For: Patient   Referral/Consult From: [facility]; Children   Last Encounter  11/25/22   Complexity of Encounter Moderate   Spiritual/Emotional needs   Type Spiritual Support   Assessment/Intervention/Outcome   Assessment Calm;Peaceful   Intervention Active listening;Discussed illness injury and its impact; Discussed belief system/Congregational practices/millie;Prayer (assurance of)/[place];Sustaining Presence/Ministry of presence   Outcome Acceptance; Coping;Engaged in conversation;Peace; Receptive

## 2022-11-26 VITALS
BODY MASS INDEX: 40.76 KG/M2 | TEMPERATURE: 98 F | HEART RATE: 88 BPM | WEIGHT: 268.96 LBS | HEIGHT: 68 IN | SYSTOLIC BLOOD PRESSURE: 138 MMHG | OXYGEN SATURATION: 100 % | RESPIRATION RATE: 18 BRPM | DIASTOLIC BLOOD PRESSURE: 89 MMHG

## 2022-11-26 LAB
ABSOLUTE EOS #: 0.1 K/UL (ref 0–0.4)
ABSOLUTE LYMPH #: 1.8 K/UL (ref 1–4.8)
ABSOLUTE MONO #: 0.3 K/UL (ref 0.1–1.3)
ANION GAP SERPL CALCULATED.3IONS-SCNC: 8 MMOL/L (ref 9–17)
BASOPHILS # BLD: 1 % (ref 0–2)
BASOPHILS ABSOLUTE: 0 K/UL (ref 0–0.2)
BUN BLDV-MCNC: 7 MG/DL (ref 6–20)
CALCIUM SERPL-MCNC: 7.7 MG/DL (ref 8.6–10.4)
CHLORIDE BLD-SCNC: 105 MMOL/L (ref 98–107)
CO2: 26 MMOL/L (ref 20–31)
CREAT SERPL-MCNC: 0.54 MG/DL (ref 0.5–0.9)
EOSINOPHILS RELATIVE PERCENT: 1 % (ref 0–4)
GFR SERPL CREATININE-BSD FRML MDRD: >60 ML/MIN/1.73M2
GLUCOSE BLD-MCNC: 103 MG/DL (ref 65–105)
GLUCOSE BLD-MCNC: 145 MG/DL (ref 65–105)
GLUCOSE BLD-MCNC: 149 MG/DL (ref 70–99)
GLUCOSE BLD-MCNC: 180 MG/DL (ref 65–105)
HCT VFR BLD CALC: 38.3 % (ref 36–46)
HEMOGLOBIN: 12.4 G/DL (ref 12–16)
LYMPHOCYTES # BLD: 32 % (ref 24–44)
MCH RBC QN AUTO: 30 PG (ref 26–34)
MCHC RBC AUTO-ENTMCNC: 32.4 G/DL (ref 31–37)
MCV RBC AUTO: 92.7 FL (ref 80–100)
MONOCYTES # BLD: 6 % (ref 1–7)
PDW BLD-RTO: 12.8 % (ref 11.5–14.9)
PLATELET # BLD: 190 K/UL (ref 150–450)
PMV BLD AUTO: 9.1 FL (ref 6–12)
POTASSIUM SERPL-SCNC: 3.6 MMOL/L (ref 3.7–5.3)
RBC # BLD: 4.13 M/UL (ref 4–5.2)
REASON FOR REJECTION: NORMAL
SEG NEUTROPHILS: 60 % (ref 36–66)
SEGMENTED NEUTROPHILS ABSOLUTE COUNT: 3.3 K/UL (ref 1.3–9.1)
SODIUM BLD-SCNC: 139 MMOL/L (ref 135–144)
WBC # BLD: 5.4 K/UL (ref 3.5–11)
ZZ NTE CLEAN UP: ORDERED TEST: NORMAL
ZZ NTE WITH NAME CLEAN UP: SPECIMEN SOURCE: NORMAL

## 2022-11-26 PROCEDURE — 36415 COLL VENOUS BLD VENIPUNCTURE: CPT

## 2022-11-26 PROCEDURE — 6370000000 HC RX 637 (ALT 250 FOR IP)

## 2022-11-26 PROCEDURE — 80048 BASIC METABOLIC PNL TOTAL CA: CPT

## 2022-11-26 PROCEDURE — 85025 COMPLETE CBC W/AUTO DIFF WBC: CPT

## 2022-11-26 PROCEDURE — 6360000002 HC RX W HCPCS

## 2022-11-26 PROCEDURE — 6370000000 HC RX 637 (ALT 250 FOR IP): Performed by: EMERGENCY MEDICINE

## 2022-11-26 RX ORDER — INSULIN GLARGINE 100 [IU]/ML
20 INJECTION, SOLUTION SUBCUTANEOUS DAILY
Qty: 10 ML | Refills: 3 | Status: SHIPPED | OUTPATIENT
Start: 2022-11-26

## 2022-11-26 RX ADMIN — LORAZEPAM 1 MG: 1 TABLET ORAL at 08:40

## 2022-11-26 RX ADMIN — DILTIAZEM HYDROCHLORIDE 360 MG: 180 CAPSULE, COATED, EXTENDED RELEASE ORAL at 08:41

## 2022-11-26 RX ADMIN — LOSARTAN POTASSIUM 50 MG: 50 TABLET, FILM COATED ORAL at 08:41

## 2022-11-26 RX ADMIN — METOPROLOL SUCCINATE 25 MG: 25 TABLET, EXTENDED RELEASE ORAL at 08:41

## 2022-11-26 RX ADMIN — FLUOXETINE 40 MG: 20 CAPSULE ORAL at 08:41

## 2022-11-26 RX ADMIN — LORAZEPAM 1 MG: 1 TABLET ORAL at 14:33

## 2022-11-26 RX ADMIN — FOLIC ACID 1 MG: 1 TABLET ORAL at 08:41

## 2022-11-26 RX ADMIN — LORAZEPAM 1 MG: 1 TABLET ORAL at 10:50

## 2022-11-26 RX ADMIN — METHOCARBAMOL 1500 MG: 750 TABLET ORAL at 08:41

## 2022-11-26 RX ADMIN — ACETAMINOPHEN 650 MG: 325 TABLET ORAL at 02:35

## 2022-11-26 RX ADMIN — THIAMINE HCL TAB 100 MG 100 MG: 100 TAB at 08:41

## 2022-11-26 RX ADMIN — LORAZEPAM 1 MG: 1 TABLET ORAL at 02:34

## 2022-11-26 RX ADMIN — LORAZEPAM 1 MG: 1 TABLET ORAL at 05:34

## 2022-11-26 RX ADMIN — FONDAPARINUX SODIUM 10 MG: 10 INJECTION, SOLUTION SUBCUTANEOUS at 08:41

## 2022-11-26 RX ADMIN — LORAZEPAM 1 MG: 1 TABLET ORAL at 12:35

## 2022-11-26 RX ADMIN — METHOCARBAMOL 1500 MG: 750 TABLET ORAL at 14:33

## 2022-11-26 RX ADMIN — FAMOTIDINE 20 MG: 20 TABLET, FILM COATED ORAL at 08:41

## 2022-11-26 ASSESSMENT — PAIN DESCRIPTION - LOCATION: LOCATION: HEAD

## 2022-11-26 ASSESSMENT — PAIN SCALES - GENERAL: PAINLEVEL_OUTOF10: 5

## 2022-11-26 ASSESSMENT — PAIN DESCRIPTION - DESCRIPTORS: DESCRIPTORS: DISCOMFORT;PRESSURE

## 2022-11-26 NOTE — CARE COORDINATION
ONGOING DISCHARGE PLAN:    Patient is alert and oriented x4. Spoke with patient regarding discharge plan and patient confirms that plan is still to go home with no needs. Pt will be discharged today. Will continue to follow for additional discharge needs.     Electronically signed by Brianda Ríos RN on 11/26/2022 at 1:30 PM

## 2022-11-26 NOTE — PLAN OF CARE
Problem: Safety - Adult  Goal: Free from fall injury  11/26/2022 0424 by Terrence Jeans, RN  Outcome: Progressing  Note: No falls noted this shift. Patient ambulates independently without difficulty. Bed kept in low position. Safe environment maintained. Bedside table & call light in reach. Uses call light appropriately when needing assistance. Problem: Chronic Conditions and Co-morbidities  Goal: Patient's chronic conditions and co-morbidity symptoms are monitored and maintained or improved  11/26/2022 0424 by Terrence Jeans, RN  Outcome: Progressing     Problem: Nutrition Deficit:  Goal: Optimize nutritional status  Outcome: Progressing     Problem: Pain  Goal: Verbalizes/displays adequate comfort level or baseline comfort level  11/26/2022 0424 by Terrence Jeans, RN  Outcome: Progressing  Note: Patient given PRN pain medication for pain control.      Problem: Discharge Planning  Goal: Discharge to home or other facility with appropriate resources  11/26/2022 0424 by Terrence Jeans, RN  Outcome: Progressing

## 2022-11-26 NOTE — PROGRESS NOTES
[address] [employer]    PROGRESS NOTE             11/26/2022    10:28 AM    Name:   Regi Vinson  MRN:     [id number]     Acct:      [de-identified]   Room:   [facility] [place] [address] Day:  2  Admit Date:  11/24/2022  9:38 AM    PCP:  Parminder Malloy  Code Status:  Full Code    Subjective:     C/C:   Chief Complaint   Patient presents with    Fall    Back Pain    Alcohol Problem     Interval History Status: improved. Patient seen and examined at bedside  Blood sugar better controlled today  Mentation improved, she is feeling better and fully oriented  Patient vitally stable  Consulted with nursing staff, no significant events overnight  Likely discharge today  Brief History:     The patient is a 36 y.o. Non- / non  female who presents withFall, Back Pain, and Alcohol Problem  and she is admitted to the hospital for the management of alcohol withdrawal with complications. Patient is a 65-year-old female past medical history significant for alcohol abuse, bipolar disorder, paroxysmal SVT, antiphospholipid antibody syndrome chronically maintained on Arixtra with prior history of PE and DVT presented to the ED on 11/24/2022 status post mechanical fall while drinking. Per patient, her last drink was yesterday around 6 PM.  Patient has been on a binge drinking episode for the past 4 days, 30 \"Fireball\" per day. Patient endorses being anxious/agitated accompanied with slight nausea and shakiness, patient denies tactile/auditory/visual disturbances. Patient alert and oriented x3. In ED, patient's vitals were unremarkable except tachycardia with heart rate of 125. Labs were significant for hyponatremia 130, hypomagnesemia 1.3, leukocytosis 14.2, and elevated initial troponin 40. Trauma work-up including CT head/CT cervical -thoracic-lumbar spine negative. X-ray pelvis negative.   Portable chest x-ray showing no acute intrathoracic process. She will be admitted to PCU for further management of alcohol withdrawal.    Review of Systems:     Review of Systems   Constitutional:  Negative for chills and fever. Eyes: Negative. Respiratory:  Negative for cough, choking, chest tightness and shortness of breath. Cardiovascular:  Negative for chest pain, palpitations and leg swelling. Gastrointestinal:  Positive for nausea. Negative for abdominal pain and vomiting. Genitourinary: Negative. Musculoskeletal:  Positive for back pain and neck pain. Skin: Negative. Neurological:  Negative for dizziness, tremors, light-headedness and headaches. Psychiatric/Behavioral:  Positive for agitation. The patient is nervous/anxious. Medications: Allergies:     Allergies   Allergen Reactions    Asa [Aspirin]      Patient is on chronic anticoagulation    Betadine [Povidone Iodine]     Celexa [Citalopram Hydrobromide]     Citalopram     Lasix [Furosemide]      Tolerates Bumex    Macrobid [Nitrofurantoin]     Norco [Hydrocodone-Acetaminophen] Hives    Betadine [Povidone Iodine] Rash    Codeine Rash    Lithium Nausea And Vomiting    Paroxetine Rash    Paxil [Paroxetine Hcl] Rash    Tape [Adhesive Tape] Rash     Paper tape    Tegretol [Carbamazepine] Rash       Current Meds:   Scheduled Meds:    fondaparinux  10 mg SubCUTAneous Daily    insulin glargine  20 Units SubCUTAneous Daily    methocarbamol  1,500 mg Oral TID    sodium chloride flush  5-40 mL IntraVENous 2 times per day    thiamine  100 mg Oral Daily    dilTIAZem  360 mg Oral Daily    FLUoxetine  40 mg Oral Daily    folic acid  1 mg Oral Daily    losartan  50 mg Oral Daily    metoprolol succinate  25 mg Oral Daily    pravastatin  40 mg Oral Nightly    insulin lispro  0-8 Units SubCUTAneous TID     insulin lispro  0-4 Units SubCUTAneous Nightly    sodium chloride flush  5-40 mL IntraVENous 2 times per day    famotidine  20 mg Oral BID     Continuous Infusions:    sodium chloride dextrose      sodium chloride      sodium chloride 100 mL/hr at 11/25/22 2123     PRN Meds: zinc oxide, sodium chloride flush, sodium chloride, glucose, dextrose bolus **OR** dextrose bolus, glucagon (rDNA), dextrose, sodium chloride flush, sodium chloride, polyethylene glycol, acetaminophen **OR** acetaminophen, potassium chloride **OR** potassium alternative oral replacement **OR** potassium chloride, magnesium sulfate, ondansetron **OR** ondansetron, LORazepam **OR** LORazepam **OR** LORazepam **OR** LORazepam **OR** LORazepam **OR** LORazepam **OR** LORazepam **OR** LORazepam    Data:     Past Medical History:   has a past medical history of Alcohol abuse, Anxiety, Arthritis, Painting esophagus, Benzodiazepine overdose, Bipolar disorder, unspecified ([facility] [address] Bipolar I disorder, most recent episode depressed, severe without psychotic features ([facility] [address] Cellulitis, Chronic abdominal wound infection, Constipation, Depression, Drug overdose, intentional ([address] Genital herpes, unspecified, GERD (gastroesophageal reflux disease), History of pulmonary embolism, Hx of blood clots, Hypertension, Iron deficiency anemia, Isopropyl alcohol poisoning, Lumbosacral spondylosis without myelopathy, MDRO (multiple drug resistant organisms) resistance, Miscarriage, Morbid obesity ([address] MRSA (methicillin resistant staph aureus) culture positive, Overdose of benzodiazepine, Pernicious anemia, Primary hypercoagulable state ([address] Pulmonary embolism ([address] Suicidal behavior, Suicidal ideation, Suicide attempt ([address] SVT (supraventricular tachycardia) ([facility] [address] Toxic effect of ethanol, intentional self-harm ([facility] [address] and Type 2 diabetes mellitus, with long-term current use of insulin ([facility] [address] Social History:   reports that she has never smoked. She has never used smokeless tobacco. She reports that she does not drink alcohol and does not use drugs.      Family History:   Family History   Problem Relation Age of Onset    Depression Mother High Blood Pressure Mother     High Cholesterol Mother     Diabetes Father     Heart Disease Father     Kidney Disease Father     High Blood Pressure Father     High Cholesterol Father     Cancer Maternal Uncle         of duodenum had whipple    Breast Cancer Maternal Aunt     Breast Cancer Maternal Cousin        Vitals:  /69   Pulse 82   Temp 97.8 °F (36.6 °C) (Oral)   Resp 18   Ht 5' 8\" (1.727 m)   Wt 268 lb 15.4 oz (122 kg)   SpO2 100%   BMI 40.90 kg/m²   Temp (24hrs), Av.2 °F (36.8 °C), Min:97.8 °F (36.6 °C), Max:98.6 °F (37 °C)    Recent Labs     22  1332 22  1626 22  0541   POCGLU 154* 122* 145* 103         I/O(24Hr): Intake/Output Summary (Last 24 hours) at 2022 1028  Last data filed at 2022 0550  Gross per 24 hour   Intake 736 ml   Output --   Net 736 ml         Labs:  [unfilled]    Lab Results   Component Value Date/Time    SPECIAL NOT REPORTED 2019 07:52 PM     Lab Results   Component Value Date/Time    CULTURE NO GROWTH 1 DAY 2022 02:13 PM       [unfilled]    Radiology:    XR PELVIS (1-2 VIEWS)    Result Date: 2022  EXAMINATION: ONE XRAY VIEW OF THE PELVIS 2022 9:51 am COMPARISON: 10/03/2020 HISTORY: ORDERING SYSTEM PROVIDED HISTORY: fall TECHNOLOGIST PROVIDED HISTORY: fall Reason for Exam: Fall last night FINDINGS: Pelvic bones are intact without evidence of acute fracture or displacement. No signs of acute fracture or dislocation at the bilateral hips. Degenerative changes noted in the visualized lower lumbar spine. No acute osseous abnormality in the pelvis.      CT HEAD WO CONTRAST    Result Date: 2022  EXAMINATION: CT OF THE HEAD WITHOUT CONTRAST  2022 10:13 am TECHNIQUE: CT of the head was performed without the administration of intravenous contrast. Automated exposure control, iterative reconstruction, and/or weight based adjustment of the mA/kV was utilized to reduce the radiation dose to as low as reasonably achievable. COMPARISON: 12/01/2020 HISTORY: ORDERING SYSTEM PROVIDED HISTORY: fall TECHNOLOGIST PROVIDED HISTORY: fall Decision Support Exception - unselect if not a suspected or confirmed emergency medical condition->Emergency Medical Condition (MA) Is the patient pregnant?->No Reason for Exam: fall c/o pain to back FINDINGS: BRAIN/VENTRICLES: There is no acute intracranial hemorrhage, mass effect or midline shift. No abnormal extra-axial fluid collection. The gray-white differentiation is maintained without evidence of an acute infarct. There is no evidence of hydrocephalus. ORBITS: The visualized portion of the orbits demonstrate no acute abnormality. SINUSES: The visualized paranasal sinuses and mastoid air cells demonstrate no acute abnormality. SOFT TISSUES/SKULL:  No acute abnormality of the visualized skull or soft tissues. No acute intracranial abnormality. CT CERVICAL SPINE WO CONTRAST    Result Date: 11/24/2022  EXAMINATION: CT OF THE CERVICAL SPINE WITHOUT CONTRAST 11/24/2022 10:13 am TECHNIQUE: CT of the cervical spine was performed without the administration of intravenous contrast. Multiplanar refo rmatted images are provided for review. Automated exposure control, iterative reconstruction, and/or weight based adjustment of the mA/kV was utilized to reduce the radiation dose to as low as reasonably achievable. COMPARISON: CT cervical spine dated 12/01/2020 HISTORY: ORDERING SYSTEM PROVIDED HISTORY: fall TECHNOLOGIST PROVIDED HISTORY: fall Decision Support Exception - unselect if not a suspected or confirmed emergency medical condition->Emergency Medical Condition (MA) Is the patient pregnant?->No Reason for Exam: fall c/o pain to back FINDINGS: BONES/ALIGNMENT: There is no acute fracture or traumatic malalignment. DEGENERATIVE CHANGES: No significant degenerative changes. SOFT TISSUES: There is no prevertebral soft tissue swelling.      No acute abnormality of the cervical spine.     CT THORACIC SPINE WO CONTRAST    Result Date: 11/24/2022  EXAMINATION: CT OF THE THORACIC SPINE WITHOUT CONTRAST  11/24/2022 10:13 am: TECHNIQUE: CT of the thoracic spine was performed without the administration of intravenous contrast. Multiplanar reformatted images are provided for review. Automated exposure control, iterative reconstruction, and/or weight based adjustment of the mA/kV was utilized to reduce the radiation dose to as low as reasonably achievable. COMPARISON: CT chest dated 03/16/2022 HISTORY: ORDERING SYSTEM PROVIDED HISTORY: fall TECHNOLOGIST PROVIDED HISTORY: fall Is the patient pregnant?->No Reason for Exam: fall c/o pain to back FINDINGS: BONES/ALIGNMENT: There is normal alignment of the spine. The vertebral body heights are maintained. No osseous destructive lesion is seen. DEGENERATIVE CHANGES: No gross spinal canal stenosis or bony neural foraminal narrowing of the thoracic spine. SOFT TISSUES: No paraspinal mass is seen. Unremarkable CT of the thoracic spine. CT LUMBAR SPINE WO CONTRAST    Result Date: 11/24/2022  EXAMINATION: CT OF THE LUMBAR SPINE WITHOUT CONTRAST  11/24/2022 TECHNIQUE: CT of the lumbar spine was performed without the administration of intravenous contrast. Multiplanar reformatted images are provided for review. Adjustment of mA and/or kV according to patient size was utilized. Automated exposure control, iterative reconstruction, and/or weight based adjustment of the mA/kV was utilized to reduce the radiation dose to as low as reasonably achievable. COMPARISON: CT abdomen and pelvis dated 03/13/2022. HISTORY: ORDERING SYSTEM PROVIDED HISTORY: fall TECHNOLOGIST PROVIDED HISTORY: fall Decision Support Exception - unselect if not a suspected or confirmed emergency medical condition->Emergency Medical Condition (MA) Is the patient pregnant?->No Reason for Exam: fall c/o pain to back FINDINGS: BONES/ALIGNMENT: There is normal alignment of the spine.  The vertebral body heights are maintained. No osseous destructive lesion is seen. DEGENERATIVE CHANGES: There is moderate disc space narrowing and endplate spurring at W1-J5, L4-L5, and L5-S1. Mild-to-moderate multilevel facet arthropathy. There is mild-to-moderate neural foraminal narrowing at L4-L5. Mild-to-moderate spinal canal narrowing at L1-L2 secondary to a left paracentral disc protrusion, which is unchanged. No high-grade osseous encroachment on the spinal canal. SOFT TISSUES/RETROPERITONEUM: No paraspinal mass is seen. Evidence of previous gastric bypass surgery. Hepatic steatosis. IVC filter noted. 1.  No acute osseous abnormality in the lumbar spine. 2.  Mild-to-moderate degenerative changes. XR CHEST PORTABLE    Result Date: 11/24/2022  EXAMINATION: ONE XRAY VIEW OF THE CHEST 11/24/2022 9:51 am COMPARISON: 06/09/2022 HISTORY: ORDERING SYSTEM PROVIDED HISTORY: fall TECHNOLOGIST PROVIDED HISTORY: fall Reason for Exam: Fall last night FINDINGS: Cardiomediastinal silhouette and pulmonary vasculature are within normal limits. No focal airspace consolidation, pneumothorax, or pleural effusion. No free air beneath the diaphragm. No acute osseous abnormality. No acute intrathoracic process. Physical Examination:        Physical Exam  Constitutional:       Appearance: Normal appearance. HENT:      Head: Normocephalic and atraumatic. Eyes:      Extraocular Movements: Extraocular movements intact. Conjunctiva/sclera: Conjunctivae normal.   Cardiovascular:      Rate and Rhythm: Regular rate and rhythm. Heart sounds: No murmur heard. No gallop. Pulmonary:      Effort: No respiratory distress. Breath sounds: No wheezing or rales. Abdominal:      General: Bowel sounds are normal. There is no distension. Palpations: Abdomen is soft. Tenderness: There is no abdominal tenderness. Musculoskeletal:      Right lower leg: No edema. Left lower leg: No edema. Skin:     General: Skin is warm. Neurological:      Mental Status: She is alert and oriented to person, place, and time. Cranial Nerves: No cranial nerve deficit. Gait: Gait normal.      Comments: Mild upper extremity tremors   Psychiatric:         Mood and Affect: Mood normal.         Behavior: Behavior normal.       Assessment:        Primary Problem  Alcohol withdrawal syndrome with complication [facility])    Active Hospital Problems    Diagnosis Date Noted    Alcohol withdrawal syndrome with complication [facility]) [H50.456] 11/24/2022     Priority: Medium       Plan:        No significant changes since last night, discharge plan    Alcohol withdrawal status post fall  -Patient's last drink 6 PM on 11/23/2022  -Early stage complicated with tachycardia, anxiety, and tremors   -Start patient on thiamine and folate  -Vitamin B12 and folate within normal limits  -[facility] protocol   -Trauma work-up including CT head/CT cervical -thoracic-lumbar spine negative.   X-ray pelvis negative  -Blood ethanol level unremarkable  -Fall precautions  -Seizure precautions  -Robaxin 3 times daily for neck/back pain  -Patient improved, she is no more anxious and her vitals are stable, and she is ready to be discharged    Type 2 diabetes  -Patient's blood glucose dropped to 39 this morning  -Last known A1c 6/9/2022: 7.6  -Patient's Lantus on hold for today will resume Lantus 20 starting 11/26/2022  -Medium dose sliding scale  -Hypoglycemic protocol  -POCT glucose checks     Antiphospholipid antibody syndrome  -Patient has prior history of PE/DVT  -Continue patient home med/fondaparinux     Essential hypertension  -Continue home med Cardizem extended release 360 and metoprolol 25 daily     DVT prophylaxis: Fondaparinux  GI prophylaxis: Pepcid  Diet: adult diabetic diet  CODE STATUS: full code    Verenice Grissom MD  11/26/2022  10:28 AM     Electronically signed by Verenice Grissom MD

## 2022-11-26 NOTE — PROGRESS NOTES
Discharge instructions discussed with patient and family at bedside; Recent plan of care during stay, most recent vitals, medication changes and follow-up appointment discussed. All questions and concerns addressed at this time. Patient discharging home; Wheeled out via wheelchair by staff. Electronically signed by Romelia Tavera RN.

## 2022-11-27 ENCOUNTER — APPOINTMENT (OUTPATIENT)
Dept: CT IMAGING | Age: 42
End: 2022-11-27
Payer: MEDICARE

## 2022-11-27 ENCOUNTER — HOSPITAL ENCOUNTER (EMERGENCY)
Age: 42
Discharge: HOME | End: 2022-11-27
Attending: EMERGENCY MEDICINE
Payer: MEDICARE

## 2022-11-27 VITALS
HEART RATE: 88 BPM | DIASTOLIC BLOOD PRESSURE: 108 MMHG | HEIGHT: 68 IN | RESPIRATION RATE: 16 BRPM | OXYGEN SATURATION: 92 % | BODY MASS INDEX: 40.9 KG/M2 | TEMPERATURE: 98.3 F | SYSTOLIC BLOOD PRESSURE: 188 MMHG

## 2022-11-27 DIAGNOSIS — I10 ESSENTIAL HYPERTENSION: ICD-10-CM

## 2022-11-27 DIAGNOSIS — H53.9 VISUAL DISTURBANCE: ICD-10-CM

## 2022-11-27 DIAGNOSIS — S09.90XD INJURY OF HEAD, SUBSEQUENT ENCOUNTER: ICD-10-CM

## 2022-11-27 DIAGNOSIS — R20.0 ARM NUMBNESS: ICD-10-CM
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
## 2022-11-27 DIAGNOSIS — M54.2 NECK PAIN: ICD-10-CM

## 2022-11-27 DIAGNOSIS — D68.61 ANTIPHOSPHOLIPID SYNDROME (HCC): Primary | ICD-10-CM

## 2022-11-27 PROBLEM — M54.6 THORACIC BACK PAIN: Status: ACTIVE | Noted: 2022-11-27
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
## 2022-11-27 PROBLEM — H43.391 FLOATERS IN VISUAL FIELD, RIGHT: Status: ACTIVE | Noted: 2022-11-27

## 2022-11-27 LAB
ANION GAP SERPL CALCULATED.3IONS-SCNC: 14 MMOL/L (ref 9–17)
BUN BLDV-MCNC: 12 MG/DL (ref 6–20)
CALCIUM SERPL-MCNC: 8.3 MG/DL (ref 8.6–10.4)
CHLORIDE BLD-SCNC: 104 MMOL/L (ref 98–107)
CO2: 19 MMOL/L (ref 20–31)
CREAT SERPL-MCNC: 0.56 MG/DL (ref 0.5–0.9)
GFR SERPL CREATININE-BSD FRML MDRD: >60 ML/MIN/1.73M2
GLUCOSE BLD-MCNC: 216 MG/DL (ref 70–99)
HCT VFR BLD CALC: 40.6 % (ref 36.3–47.1)
HEMOGLOBIN: 13.2 G/DL (ref 11.9–15.1)
MCH RBC QN AUTO: 30.3 PG (ref 25.2–33.5)
MCHC RBC AUTO-ENTMCNC: 32.5 G/DL (ref 28.4–34.8)
MCV RBC AUTO: 93.3 FL (ref 82.6–102.9)
NRBC AUTOMATED: 0 PER 100 WBC
PDW BLD-RTO: 11.8 % (ref 11.8–14.4)
PLATELET # BLD: NORMAL K/UL (ref 138–453)
PLATELET, FLUORESCENCE: 176 K/UL (ref 138–453)
PLATELET, IMMATURE FRACTION: 15.3 % (ref 1.1–10.3)
POTASSIUM SERPL-SCNC: 4.1 MMOL/L (ref 3.7–5.3)
RBC # BLD: 4.35 M/UL (ref 3.95–5.11)
SODIUM BLD-SCNC: 137 MMOL/L (ref 135–144)
TROPONIN, HIGH SENSITIVITY: 8 NG/L (ref 0–14)
WBC # BLD: 7.6 K/UL (ref 3.5–11.3)

## 2022-11-27 PROCEDURE — 70450 CT HEAD/BRAIN W/O DYE: CPT

## 2022-11-27 PROCEDURE — 85055 RETICULATED PLATELET ASSAY: CPT

## 2022-11-27 PROCEDURE — 93005 ELECTROCARDIOGRAM TRACING: CPT | Performed by: EMERGENCY MEDICINE

## 2022-11-27 PROCEDURE — 6360000002 HC RX W HCPCS: Performed by: EMERGENCY MEDICINE

## 2022-11-27 PROCEDURE — 6360000004 HC RX CONTRAST MEDICATION: Performed by: EMERGENCY MEDICINE

## 2022-11-27 PROCEDURE — 99285 EMERGENCY DEPT VISIT HI MDM: CPT

## 2022-11-27 PROCEDURE — 84484 ASSAY OF TROPONIN QUANT: CPT

## 2022-11-27 PROCEDURE — 70498 CT ANGIOGRAPHY NECK: CPT

## 2022-11-27 PROCEDURE — 96374 THER/PROPH/DIAG INJ IV PUSH: CPT

## 2022-11-27 PROCEDURE — 6370000000 HC RX 637 (ALT 250 FOR IP): Performed by: EMERGENCY MEDICINE

## 2022-11-27 PROCEDURE — 80048 BASIC METABOLIC PNL TOTAL CA: CPT

## 2022-11-27 PROCEDURE — 85027 COMPLETE CBC AUTOMATED: CPT

## 2022-11-27 RX ORDER — CYCLOBENZAPRINE HCL 10 MG
10 TABLET ORAL ONCE
Status: COMPLETED | OUTPATIENT
Start: 2022-11-27 | End: 2022-11-27

## 2022-11-27 RX ORDER — OXYCODONE HYDROCHLORIDE 5 MG/1
5 TABLET ORAL ONCE
Status: COMPLETED | OUTPATIENT
Start: 2022-11-27 | End: 2022-11-27

## 2022-11-27 RX ORDER — ONDANSETRON 4 MG/1
4 TABLET, ORALLY DISINTEGRATING ORAL EVERY 8 HOURS PRN
Qty: 9 TABLET | Refills: 0 | Status: SHIPPED | OUTPATIENT
Start: 2022-11-27 | End: 2022-11-30

## 2022-11-27 RX ORDER — ACETAMINOPHEN 500 MG
1000 TABLET ORAL ONCE
Status: COMPLETED | OUTPATIENT
Start: 2022-11-27 | End: 2022-11-27

## 2022-11-27 RX ORDER — ONDANSETRON 2 MG/ML
4 INJECTION INTRAMUSCULAR; INTRAVENOUS ONCE
Status: COMPLETED | OUTPATIENT
Start: 2022-11-27 | End: 2022-11-27

## 2022-11-27 RX ADMIN — CYCLOBENZAPRINE 10 MG: 10 TABLET, FILM COATED ORAL at 17:06

## 2022-11-27 RX ADMIN — ONDANSETRON 4 MG: 2 INJECTION INTRAMUSCULAR; INTRAVENOUS at 17:05

## 2022-11-27 RX ADMIN — OXYCODONE 5 MG: 5 TABLET ORAL at 21:55

## 2022-11-27 RX ADMIN — ACETAMINOPHEN 1000 MG: 500 TABLET ORAL at 17:06

## 2022-11-27 RX ADMIN — OXYCODONE 5 MG: 5 TABLET ORAL at 19:19

## 2022-11-27 RX ADMIN — IOPAMIDOL 90 ML: 755 INJECTION, SOLUTION INTRAVENOUS at 20:02

## 2022-11-27 ASSESSMENT — ENCOUNTER SYMPTOMS
SHORTNESS OF BREATH: 0
VOMITING: 0
BACK PAIN: 1
NAUSEA: 0
DIARRHEA: 0

## 2022-11-27 ASSESSMENT — PAIN DESCRIPTION - FREQUENCY: FREQUENCY: CONTINUOUS

## 2022-11-27 ASSESSMENT — PAIN DESCRIPTION - LOCATION
LOCATION: HEAD;BACK;NECK
LOCATION: BACK;NECK;SHOULDER

## 2022-11-27 ASSESSMENT — PAIN - FUNCTIONAL ASSESSMENT: PAIN_FUNCTIONAL_ASSESSMENT: 0-10

## 2022-11-27 ASSESSMENT — PAIN DESCRIPTION - ORIENTATION: ORIENTATION: RIGHT;MID;OUTER

## 2022-11-27 ASSESSMENT — PAIN DESCRIPTION - DESCRIPTORS
DESCRIPTORS: TIGHTNESS;ACHING
DESCRIPTORS: SHARP

## 2022-11-27 ASSESSMENT — PAIN SCALES - GENERAL: PAINLEVEL_OUTOF10: 8

## 2022-11-27 ASSESSMENT — VISUAL ACUITY: OU: 1

## 2022-11-27 NOTE — ED NOTES
Vision acuity chart results with contacts LT eye 20/30, RT eye 20/30     Polina Medina LPN  47/44/01 [id number]

## 2022-11-27 NOTE — ED TRIAGE NOTES
Patient presented to the ED today with complaints of neck pain. Patient states she fell 4 days ago and experienced LOC and vision disturbance.

## 2022-11-27 NOTE — ED NOTES
Patient verbalizes Tylenol and Norflex have not helped pain  Requesting more pain meds  Doc to be informed     Walter Vincent RN  11/27/22 [id number]

## 2022-11-27 NOTE — ED NOTES
The following labs were labeled with appropriate pt sticker and tubed to lab: me  [x] Blue     [x] Lavender   [] on ice  [x] Green/yellow  [] Green/black [] on ice  [] Mary Alice Brunilda  [] on ice  [x] Yellow  [] Red  [] Type/ Screen  [] ABG  [] VBG    [] COVID-19 swab    [] Rapid  [] PCR  [] Flu swab  [] Peds Viral Panel     [] Urine Sample  [] Fecal Sample  [] Pelvic Cultures  [] Blood Cultures  [] X 2  [] STREP Cultures         Matty Sanchez RN  11/27/22 5812

## 2022-11-27 NOTE — ED NOTES
Patient fell on Wednesday and is now having \"black spots and feeling dizzy\" along with a \"tingling sensation to RUE  Patient is a/o  Dr. Natalie Viveros at bedside  Placed on monitor       Izabel Mcnulty RN  11/27/22 [id number]
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
## 2022-11-28 LAB
EKG ATRIAL RATE: 106 BPM
EKG ATRIAL RATE: 86 BPM
EKG P AXIS: 59 DEGREES
EKG P AXIS: 63 DEGREES
EKG P-R INTERVAL: 132 MS
EKG P-R INTERVAL: 140 MS
EKG Q-T INTERVAL: 346 MS
EKG Q-T INTERVAL: 372 MS
EKG QRS DURATION: 86 MS
EKG QRS DURATION: 88 MS
EKG QTC CALCULATION (BAZETT): 445 MS
EKG QTC CALCULATION (BAZETT): 459 MS
EKG R AXIS: 33 DEGREES
EKG R AXIS: 66 DEGREES
EKG T AXIS: 43 DEGREES
EKG T AXIS: 67 DEGREES
EKG VENTRICULAR RATE: 106 BPM
EKG VENTRICULAR RATE: 86 BPM

## 2022-11-28 NOTE — CONSULTS
[address] [facility] Neurology   [address] [place] [address]    Inpatient Neurology Consult Note             Date:   11/27/2022  Patient name:  Camila Norman  Date of admission:  11/27/2022  4:14 PM  MRN:   [id number]  Account:  [de-identified]  YOB: 1980  PCP:    Vianey Guerrier  Room:   21/21  Code Status:    Prior    Chief Complaint:     Chief Complaint   Patient presents with    Loss of Consciousness    Fall    Neck Pain    Other     Vision Disturbance (seeing black spots (RT eye))     History Obtained From:     patient, spouse  at bedside, electronic medical record    History of Present Illness: The patient is a 36 y.o.  female who presents 11/27/22  to the emergency department via walking in with  with recent mechanical fall Wednesday 11/23/22 when getting out of the car she fell backwards slipping on drive hitting thoracic region of her back. Extensive PMH significant for antiphospholipid antibody syndrome on arixtra shot daily for [facility], previous PE and DVT, HTN, HLD, DM2 insulin dependent, multiple abdominal surgeries including Bariatric surgery, JUSTINE and BSO, Cholecystectomy, liver resection for hepatic adenoma, 8 previous hernia repairs, Polysubstance abuse including ETOH, benzos, Bipolar disorder, suicide attempt. Patient states that last Wednesday about 5 days ago she fell getting out of her car landing on her thoracic spine. She went to [facility] [place] was admitted for workup with pan spinal imaging, ct head all of which were unremarkable. Patient treated for electrolyte abnormalities and discharged 11/26/22. Since that visit patient went home yesterday and began to notice black floaters in her right eye only and sharp pain radiating into her right arm both of which are intermittent and only occur with extreme right head turning.  Patient rates the T7 pain as 8/10 intermittent and sharp shooting behind her right shoulder blade into the shoulder and are patent. Unremarkable MRA of the head.      Past Medical History:     Past Medical History:   Diagnosis Date    Alcohol abuse     sober [id number]    Anxiety     Arthritis     Painting esophagus     Benzodiazepine overdose 9/26/2015    Bipolar disorder, unspecified ([address]     Bipolar I disorder, most recent episode depressed, severe without psychotic features ([address]     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 benzodiazepine     Pernicious anemia     Primary hypercoagulable state ([address]     antiphospholipid antibodies on Arixtra shots daily    Pulmonary embolism ([address] 2010    Suicidal behavior 12/14/2015    Suicidal ideation     Suicide attempt ([address] 2014    hx OD on painkillers and rubbing alcohol    SVT (supraventricular tachycardia) ([address] 04/07/2017    Toxic effect of ethanol, intentional self-harm ([address] 12/16/2015    Type 2 diabetes mellitus, with long-term current use of insulin ([address]         Past Surgical History:     Past Surgical History:   Procedure Laterality Date    ABDOMINAL HERNIA REPAIR  2014    wound vac    ABDOMINAL HERNIA REPAIR  11/3/14    BARIATRIC SURGERY  2013    [address] [facility]    CHOLECYSTECTOMY      DILATION AND CURETTAGE OF UTERUS  2005    ENDOSCOPY, COLON, DIAGNOSTIC      EGD    FINGER AMPUTATION Left 02/09/2015    index and ring finger. from frostbite    HERNIA REPAIR      total of 8    IVC FILTER INSERTION      LIVER RESECTION      for hepatic adenoma    LYMPH NODE BIOPSY  1990    JUSTINE AND BSO (CERVIX REMOVED)  2011    TONSILLECTOMY          Medications Prior to Admission:     Prior to Admission medications    Medication Sig Start Date End Date Taking? Authorizing Provider   ondansetron (ZOFRAN ODT) 4 MG disintegrating tablet Take 1 tablet by mouth every 8 hours as needed for Nausea 11/27/22 11/30/22 Yes Jaimie Newton DO   insulin glargine (LANTUS) 100 UNIT/ML injection vial Inject 20 Units into the skin daily 11/26/22   Remigio Lees MD   fondaparinux (ARIXTRA) 10 MG/0.8ML SOLN injection Inject 10 mg into the skin daily 11/10/22   Historical Provider, MD   FLUoxetine (PROZAC) 20 MG capsule Take 2 capsules by mouth daily 6/12/22   Lucien Almaguer MD   insulin lispro (HUMALOG) 100 UNIT/ML SOLN injection vial **Medium Dose Corrective Algorithm**  Glucose: Dose:  If <139             No Insulin  140-199 2 Units  200-249 4 Units  250-299 6 Units  300-349 8 Units  350-400 10 Units  Above 400       12 Units 6/12/22   Lucien Almaguer MD   metoprolol succinate (TOPROL XL) 25 MG extended release tablet Take 1 tablet by mouth daily 6/13/22   Lucien Almaguer MD   miconazole (MICOTIN) 2 % powder Apply topically 2 times daily. 6/12/22   Lucien Almaguer MD   folic acid (FOLVITE) 1 MG tablet Take 1 tablet by mouth daily 6/13/22   Lucien Almaguer MD   thiamine mononitrate (THIAMINE) 100 MG tablet Take 1 tablet by mouth daily 6/13/22   Lucien Almaguer MD   dilTIAZem (CARDIZEM CD) 360 MG extended release capsule Take 360 mg by mouth daily    Historical Provider, MD   losartan (COZAAR) 50 mg tablet Take 50 mg by mouth daily    Historical Provider, MD   HYDROcodone-acetaminophen (NORCO) 5-325 MG per tablet Take 1 tablet by mouth every 6 hours as needed for Pain.  Indications: Last filled 5/24/22 for 30 days Historical Provider, MD   famotidine (PEPCID) 20 MG tablet TAKE 1 TABLET BY MOUTH TWICE DAILY 4/22/21   Nandini Roberts MD   cyanocobalamin 1000 MCG/ML injection Inject 1 mL into the muscle every 7 days 2/13/21   Nandini Roberts MD   Syringe/Needle, Disp, (SYRINGE 3CC/25GX1\") 25G X 1\" 3 ML MISC To be used with B12 injections monthly 2/13/21   Nandini Roberts MD   FARXIGA 10 MG tablet TAKE 1 TABLET BY MOUTH EVERY MORNING 11/2/20   Nandini Roberts MD   pravastatin (PRAVACHOL) 40 MG tablet TAKE 1 TABLET(40 MG) BY MOUTH DAILY 6/26/20   Nandini Roberts MD   acetaminophen (TYLENOL) 500 MG tablet Take 2 tablets by mouth every 8 hours as needed for Pain 2/19/20   Early Peers, DO   glucose monitoring kit (FREESTYLE) monitoring kit Testing twice a day. Please dispense according to patients insurance. 12/20/19   Nandini Roberts MD   Insulin Pen Needle 31G X 5 MM MISC 1 each by Does not apply route daily 12/20/19   Nandini Roberts MD   Lancets 30G MISC Testing twice a day. Please dispense according to patients insurance. 12/20/19   Nandini Roberts MD   blood glucose monitor strips Testing twice a day. Please dispense according to patients insurance. 12/20/19   Nandini Roberts MD   Multiple Vitamins-Minerals (THERAPEUTIC MULTIVITAMIN-MINERALS) tablet Take 1 tablet by mouth daily    Historical Provider, MD   vitamin D (CHOLECALCIFEROL) 1000 UNIT TABS tablet Take 1,000 Units by mouth daily     Historical Provider, MD        Allergies:     Asa [aspirin], Betadine [povidone iodine], Celexa [citalopram hydrobromide], Citalopram, Lasix [furosemide], Macrobid [nitrofurantoin], Norco [hydrocodone-acetaminophen], Betadine [povidone iodine], Codeine, Lithium, Paroxetine, Paxil [paroxetine hcl], Tape [adhesive tape], and Tegretol [carbamazepine]    Social History:     Tobacco:    reports that she has never smoked.  She has never used smokeless tobacco.  Alcohol:      reports no history of alcohol use. Drug Use:  reports no history of drug use. Family History:     Family History   Problem Relation Age of Onset    Depression Mother     High Blood Pressure Mother     High Cholesterol Mother     Diabetes Father     Heart Disease Father     Kidney Disease Father     High Blood Pressure Father     High Cholesterol Father     Cancer Maternal Uncle         of duodenum had whipple    Breast Cancer Maternal Aunt     Breast Cancer Maternal Cousin        Review of Systems:     ROS:  Constitutional  Negative for fever and chills    HEENT  Negative for ear discharge, ear pain, nosebleed    Eyes  Positive for right eye intermittent positional only floaters    Respiratory  Negative for hemoptysis and sputum    Cardiovascular  Negative for orthopnea, claudication and PND    Gastrointestinal  Negative for abdominal pain, diarrhea, blood in stool    Musculoskeletal  Positive for mid thoracic and right shoulder pain    Neurology Negative for seizures, loss of consciousness   Skin  Negative for rash or itching    Endo/heme/allergies  Negative for polydipsia, environmental allergy    Psychiatric/behavioral  Negative for suicidal ideation.  Patient is not anxious        Physical Exam:   BP (!) 188/108   Pulse 88   Temp 98.3 °F (36.8 °C) (Oral)   Resp 16   Ht 5' 8\" (1.727 m)   SpO2 92%   BMI 40.90 kg/m²   Temp (24hrs), Av.3 °F (36.8 °C), Min:98.3 °F (36.8 °C), Max:98.3 °F (36.8 °C)    Recent Labs     22  1626 227 22  0541 22  1122   POCGLU 122* 145* 103 180*     No intake or output data in the 24 hours ending 22 [facility] comfortable and in no distress   HEENT  NC/ AT   NECK  Supple and no bruits heard   MENTAL STATUS:  Alert, oriented, intact memory, no confusion, normal speech, normal language, no hallucination or delusion   CRANIAL NERVES: II     -      Visual fields intact to confrontation  III,IV,VI -  EOMs full, no afferent defect, no JEOVANY, no ptosis  V     -     Normal facial sensation  VII    -     Normal facial symmetry  VIII   -     Intact hearing  IX,X -     Symmetrical palate  XI    -     Symmetrical shoulder shrug  XII   -     Midline tongue, no atrophy    MOTOR FUNCTION:  significant for good strength of grade 5/5 in bilateral proximal and distal muscle groups of both upper and lower extremities with normal bulk, normal tone and no involuntary movements, no tremor   SENSORY FUNCTION:  Normal touch, normal pin, normal vibration, normal proprioception, with the exception of chronic sensory loss in left foot following ankle surgery previously and above her knees to pelvis from previous frost bite.     CEREBELLAR FUNCTION:  Intact fine motor control over upper limbs   REFLEX FUNCTION:  Symmetric, no perverted reflex, no Babinski sign   STATION and GAIT  Able to walk without assistance        Investigations:      Laboratory Testing:  Recent Results (from the past 24 hour(s))   CBC    Collection Time: 11/27/22  5:37 PM   Result Value Ref Range    WBC 7.6 3.5 - 11.3 k/uL    RBC 4.35 3.95 - 5.11 m/uL    Hemoglobin 13.2 11.9 - 15.1 g/dL    Hematocrit 40.6 36.3 - 47.1 %    MCV 93.3 82.6 - 102.9 fL    MCH 30.3 25.2 - 33.5 pg    MCHC 32.5 28.4 - 34.8 g/dL    RDW 11.8 11.8 - 14.4 %    Platelets See Reflexed IPF Result 138 - 453 k/uL    NRBC Automated 0.0 0.0 per 100 WBC   Troponin    Collection Time: 11/27/22  5:37 PM   Result Value Ref Range    Troponin, High Sensitivity 8 0 - 14 ng/L   Basic Metabolic Panel    Collection Time: 11/27/22  5:37 PM   Result Value Ref Range    Glucose 216 (H) 70 - 99 mg/dL    BUN 12 6 - 20 mg/dL    Creatinine 0.56 0.50 - 0.90 mg/dL    Est, Glom Filt Rate >60 >60 mL/min/1.73m2    Calcium 8.3 (L) 8.6 - 10.4 mg/dL    Sodium 137 135 - 144 mmol/L    Potassium 4.1 3.7 - 5.3 mmol/L    Chloride 104 98 - 107 mmol/L    CO2 19 (L) 20 - 31 mmol/L    Anion Gap 14 9 - 17 mmol/L   Immature Platelet Fraction    Collection Time: 11/27/22  5:37 PM   Result Value Ref Range    Platelet, Immature Fraction 15.3 (H) 1.1 - 10.3 %    Platelet, Fluorescence 176 138 - 453 k/uL         Assessment :    Titi Marcial is a 48-year-old  female who presents to the emergency department 11/27/2022 after recent fall discharged from hospital yesterday and having persistent midthoracic pain with positional related floaters in her right eye and right arm pain. Difficult to explain single pathologic process or injury which would explain her intermittent right eye visual floaters that occur only with harsh right neck turning along with intermittent right arm pain also made worse with right head turning. Given her recent injury prudent to rule out vessel disruption such as carotid dissection or vertebral which was ruled out by CTA head and neck. Patient also has pan spinal imaging recently which was unremarkable. Given the patient's symptoms are fairly mild and intermittent positional only patient prefers to be discharged home with outpatient MRI brain, cervical, thoracic. Patient understands that if her symptoms worsen or progress to come back to the emergency department at which time she can undergo above imaging. Primary Problem  Accident due to mechanical fall without injury    Active Hospital Problems    Diagnosis Date Noted    Accident due to mechanical fall without injury [W19. XXXA] 11/27/2022     Priority: Medium    Thoracic back pain [M54.6] 11/27/2022     Priority: Medium    Floaters in visual field, right [H43.391] 11/27/2022     Priority: Medium       Plan:     Discharge home with outpatient follow-up    -Recommend MRI brain without  -Recommend MRI cervical spine and thoracic spine without  -Return to the emergency department if symptoms worsen or progress  -Follow-up with general neurology in 2 to 3 weeks      Consultations:   [facility]      Follow-up further recommendations after discussing the case with attending  The plan was discussed with the patient, patient's family and the medical staff. Patient is admitted as inpatient status because of co-morbidities listed above, severity of signs and symptoms as outlined, requirement for current medical therapies and most importantly because of direct risk to patient if care not provided in a hospital setting.     Sara Keene MD  PGY-4 Neurology Resident   11/27/2022  11:18 PM    Copy sent to Dr. Vianey Guerrier

## 2022-11-28 NOTE — DISCHARGE INSTRUCTIONS
Deena Lala!!!    [facility]    On behalf of the Emergency Department staff at [facility] Emergency Department, I would like to thank you for giving Krista Dailey the opportunity to address your health care needs and concerns. You were seen today for headache, neck pain, numbness in your Right arm and vision changes. You had a CT scan of your head and neck that were normal. Your labwork was normal. Your EKG was normal. You were evaluated by Neurology who wanted to admit you to the hospital for further testing but you refused. They are arranging outpatient testing for you. Please have testing performed and follow up with Neurology. Please return to the ER if your symptoms worsen, you have increased nausea or vomiting, worsening headache, or you develop weakness. If you notice any concerning symptoms please return to the ER immediately. These can include but are not limited to: fevers, chills, shortness of breath, vomiting, weakness of the extremities, changes in your mental status, numbness, pale extremities, or chest pain. Medications: Continue taking your home medications as previously directed. For pain You may take tylenol 1,000mg by mouth every 6 hours as needed for pain. Do not exceed 4,000mg per day. If you have liver disease don't take tylenol. You may also take ibuprofen 600mg every 6-8 hours as needed for pain. Do not exceed 2,400 mg per day. If you experience stomach pain or you have a history of kidney disease stop taking ibuprofen. You may alternate application of ice and heat 20 minutes at a time as desired. Follow up: Please follow up with your primary care doctor within one week or as needed.   Please follow up with Neurology and outpatient MRI

## 2022-11-28 NOTE — ED NOTES
Discharge instructions given. Verbalized understanding. All questions answered.        Babatunde Edge RN  11/27/22 [id number]

## 2022-11-29 LAB
CULTURE: NORMAL
SPECIMEN DESCRIPTION: NORMAL

## 2022-12-13 NOTE — DISCHARGE SUMMARY
[address]    Discharge Summary     Patient ID: Regi Vinson  :  1980   MRN: [id number]     ACCOUNT:  [de-identified]   Patient's PCP: Christiana Ledesma Date: 2022   Discharge Date: 2022  Length of Stay: 2  Code Status:  Prior  Admitting Physician: Jane Stanley MD  Discharge Physician: Kuldeep Leahy MD     Active Discharge Diagnoses:       Primary Problem  Alcohol withdrawal syndrome with complication [facility])      [place] Problems    Diagnosis Date Noted    Alcohol withdrawal syndrome with complication [facility]) [N00.303] 2022     Priority: Medium       Admission Condition:  fair     Discharged Condition: good    Hospital Stay:       Hospital Course:  Regi Vinson is a 36 y.o. female who was admitted for the management of   Alcohol withdrawal syndrome with complication [facility]) , presented to ER with Fall, Back Pain, and Alcohol Problem    Patient is a 40-year-old female past medical history significant for alcohol abuse, bipolar disorder, paroxysmal SVT, antiphospholipid antibody syndrome chronically maintained on Arixtra with prior history of PE and DVT presented to the ED on 2022 status post mechanical fall while drinking. Per patient, her last drink was 22 around 6 PM.  Patient has been on a binge drinking episode for the past 4 days, 30 \"Fireball\" per day. Patient endorses being anxious/agitated accompanied with slight nausea and shakiness, patient denies tactile/auditory/visual disturbances. Patient alert and oriented x3 on admisison. In ED, patient's vitals were unremarkable except tachycardia with heart rate of 125. Labs were significant for hyponatremia 130, hypomagnesemia 1.3, leukocytosis 14.2, and elevated initial troponin 40. Trauma work-up including CT head/CT cervical -thoracic-lumbar spine negative. X-ray pelvis negative.   Portable chest x-ray showing no acute intrathoracic process. She was admitted to PCU for further management of alcohol withdrawal.    During the course of hospitalization, patient was started on thiamine and folate, CIWA protocol initiated with fall and seizure precautions in place. Home medications were resumed. On the day of discharge patient was vitally stable with no complaints, able to tolerate oral diet. Significant Diagnostic Studies:   Labs / Micro:  CBC:   Lab Results   Component Value Date/Time    WBC 7.6 11/27/2022 05:37 PM    RBC 4.35 11/27/2022 05:37 PM    RBC 4.43 06/03/2012 04:17 AM    HGB 13.2 11/27/2022 05:37 PM    HCT 40.6 11/27/2022 05:37 PM    MCV 93.3 11/27/2022 05:37 PM    MCH 30.3 11/27/2022 05:37 PM    MCHC 32.5 11/27/2022 05:37 PM    RDW 11.8 11/27/2022 05:37 PM    PLT See Reflexed IPF Result 11/27/2022 05:37 PM     06/03/2012 04:17 AM     BMP:    Lab Results   Component Value Date/Time    GLUCOSE 216 11/27/2022 05:37 PM    GLUCOSE 403 06/03/2012 04:17 AM     11/27/2022 05:37 PM    K 4.1 11/27/2022 05:37 PM     11/27/2022 05:37 PM    CO2 19 11/27/2022 05:37 PM    ANIONGAP 14 11/27/2022 05:37 PM    BUN 12 11/27/2022 05:37 PM    CREATININE 0.56 11/27/2022 05:37 PM    BUNCRER NOT REPORTED 02/01/2022 10:01 PM    CALCIUM 8.3 11/27/2022 05:37 PM    LABGLOM >60 11/27/2022 05:37 PM    GFRAA >60 06/12/2022 06:06 AM    GFR      06/12/2022 06:06 AM        Radiology:    XR PELVIS (1-2 VIEWS)    Result Date: 11/24/2022  EXAMINATION: ONE XRAY VIEW OF THE PELVIS 11/24/2022 9:51 am COMPARISON: 10/03/2020 HISTORY: ORDERING SYSTEM PROVIDED HISTORY: fall TECHNOLOGIST PROVIDED HISTORY: fall Reason for Exam: Fall last night FINDINGS: Pelvic bones are intact without evidence of acute fracture or displacement. No signs of acute fracture or dislocation at the bilateral hips. Degenerative changes noted in the visualized lower lumbar spine. No acute osseous abnormality in the pelvis.      CT HEAD WO CONTRAST    Result Date: 11/27/2022  EXAMINATION: CTA OF THE HEAD AND NECK WITH CONTRAST; CT OF THE HEAD WITHOUT CONTRAST 11/27/2022 6:37 pm: TECHNIQUE: CTA of the head and neck was performed with the administration of intravenous contrast. Multiplanar reformatted images are provided for review. MIP images are provided for review. Stenosis of the internal carotid arteries measured using NASCET criteria. Automated exposure control, iterative reconstruction, and/or weight based adjustment of the mA/kV was utilized to reduce the radiation dose to as low as reasonably achievable.; CT of the head was performed without the administration of intravenous contrast. Automated exposure control, iterative reconstruction, and/or weight based adjustment of the mA/kV was utilized to reduce the radiation dose to as low as reasonably achievable. COMPARISON: None. HISTORY: ORDERING SYSTEM PROVIDED HISTORY: dizziness, R arm tinglign when turns head to right TECHNOLOGIST PROVIDED HISTORY: dizziness, R arm tinglign when turns head to right Decision Support Exception - unselect if not a suspected or confirmed emergency medical condition->Emergency Medical Condition (MA) Reason for Exam: headache; pt unable to remove earrings for exam; ORDERING SYSTEM PROVIDED HISTORY: headache s/p fall TECHNOLOGIST PROVIDED HISTORY: headache s/p fall Decision Support Exception - unselect if not a suspected or confirmed emergency medical condition->Emergency Medical Condition (MA) Is the patient pregnant?->No Reason for Exam: headache s/p fall FINDINGS: CTA NECK: AORTIC ARCH/ARCH VESSELS: No dissection or arterial injury. No significant stenosis of the brachiocephalic or subclavian arteries. CAROTID ARTERIES: No dissection, arterial injury, or hemodynamically significant stenosis by NASCET criteria. Minimal carotid calcifications are noted VERTEBRAL ARTERIES: No dissection, arterial injury, or significant stenosis.  There is a mild-to-moderate stenosis at the origin of the right vertebral artery. SOFT TISSUES: The lung apices are clear. No cervical or superior mediastinal lymphadenopathy. The larynx and pharynx are unremarkable. No acute abnormality of the salivary and thyroid glands. BONES: No acute osseous abnormality. CTA HEAD: ANTERIOR CIRCULATION: No significant stenosis of the intracranial internal carotid, anterior cerebral, or middle cerebral arteries. No aneurysm. There is a right-sided posterior communicating artery. POSTERIOR CIRCULATION: No significant stenosis of the vertebral, basilar, or posterior cerebral arteries. No aneurysm. OTHER: No dural venous sinus thrombosis on this non-dedicated study. BRAIN: No mass effect or midline shift. No extra-axial fluid collection. The gray-white differentiation is maintained. No acute intracranial abnormality visualized. No flow limiting stenosis or large vessel occlusion detected within the head or neck. CT HEAD WO CONTRAST    Result Date: 11/24/2022  EXAMINATION: CT OF THE HEAD WITHOUT CONTRAST  11/24/2022 10:13 am TECHNIQUE: CT of the head was performed without the administration of intravenous contrast. Automated exposure control, iterative reconstruction, and/or weight based adjustment of the mA/kV was utilized to reduce the radiation dose to as low as reasonably achievable. COMPARISON: 12/01/2020 HISTORY: ORDERING SYSTEM PROVIDED HISTORY: fall TECHNOLOGIST PROVIDED HISTORY: fall Decision Support Exception - unselect if not a suspected or confirmed emergency medical condition->Emergency Medical Condition (MA) Is the patient pregnant?->No Reason for Exam: fall c/o pain to back FINDINGS: BRAIN/VENTRICLES: There is no acute intracranial hemorrhage, mass effect or midline shift. No abnormal extra-axial fluid collection. The gray-white differentiation is maintained without evidence of an acute infarct. There is no evidence of hydrocephalus.  ORBITS: The visualized portion of the orbits demonstrate no acute abnormality. SINUSES: The visualized paranasal sinuses and mastoid air cells demonstrate no acute abnormality. SOFT TISSUES/SKULL:  No acute abnormality of the visualized skull or soft tissues. No acute intracranial abnormality. CT CERVICAL SPINE WO CONTRAST    Result Date: 11/24/2022  EXAMINATION: CT OF THE CERVICAL SPINE WITHOUT CONTRAST 11/24/2022 10:13 am TECHNIQUE: CT of the cervical spine was performed without the administration of intravenous contrast. Multiplanar refo rmatted images are provided for review. Automated exposure control, iterative reconstruction, and/or weight based adjustment of the mA/kV was utilized to reduce the radiation dose to as low as reasonably achievable. COMPARISON: CT cervical spine dated 12/01/2020 HISTORY: ORDERING SYSTEM PROVIDED HISTORY: fall TECHNOLOGIST PROVIDED HISTORY: fall Decision Support Exception - unselect if not a suspected or confirmed emergency medical condition->Emergency Medical Condition (MA) Is the patient pregnant?->No Reason for Exam: fall c/o pain to back FINDINGS: BONES/ALIGNMENT: There is no acute fracture or traumatic malalignment. DEGENERATIVE CHANGES: No significant degenerative changes. SOFT TISSUES: There is no prevertebral soft tissue swelling. No acute abnormality of the cervical spine. CT THORACIC SPINE WO CONTRAST    Result Date: 11/24/2022  EXAMINATION: CT OF THE THORACIC SPINE WITHOUT CONTRAST  11/24/2022 10:13 am: TECHNIQUE: CT of the thoracic spine was performed without the administration of intravenous contrast. Multiplanar reformatted images are provided for review. Automated exposure control, iterative reconstruction, and/or weight based adjustment of the mA/kV was utilized to reduce the radiation dose to as low as reasonably achievable.  COMPARISON: CT chest dated 03/16/2022 HISTORY: ORDERING SYSTEM PROVIDED HISTORY: fall TECHNOLOGIST PROVIDED HISTORY: fall Is the patient pregnant?->No Reason for Exam: fall c/o pain to back FINDINGS: BONES/ALIGNMENT: There is normal alignment of the spine. The vertebral body heights are maintained. No osseous destructive lesion is seen. DEGENERATIVE CHANGES: No gross spinal canal stenosis or bony neural foraminal narrowing of the thoracic spine. SOFT TISSUES: No paraspinal mass is seen. Unremarkable CT of the thoracic spine. CT LUMBAR SPINE WO CONTRAST    Result Date: 11/24/2022  EXAMINATION: CT OF THE LUMBAR SPINE WITHOUT CONTRAST  11/24/2022 TECHNIQUE: CT of the lumbar spine was performed without the administration of intravenous contrast. Multiplanar reformatted images are provided for review. Adjustment of mA and/or kV according to patient size was utilized. Automated exposure control, iterative reconstruction, and/or weight based adjustment of the mA/kV was utilized to reduce the radiation dose to as low as reasonably achievable. COMPARISON: CT abdomen and pelvis dated 03/13/2022. HISTORY: ORDERING SYSTEM PROVIDED HISTORY: fall TECHNOLOGIST PROVIDED HISTORY: fall Decision Support Exception - unselect if not a suspected or confirmed emergency medical condition->Emergency Medical Condition (MA) Is the patient pregnant?->No Reason for Exam: fall c/o pain to back FINDINGS: BONES/ALIGNMENT: There is normal alignment of the spine. The vertebral body heights are maintained. No osseous destructive lesion is seen. DEGENERATIVE CHANGES: There is moderate disc space narrowing and endplate spurring at K8-N9, L4-L5, and L5-S1. Mild-to-moderate multilevel facet arthropathy. There is mild-to-moderate neural foraminal narrowing at L4-L5. Mild-to-moderate spinal canal narrowing at L1-L2 secondary to a left paracentral disc protrusion, which is unchanged. No high-grade osseous encroachment on the spinal canal. SOFT TISSUES/RETROPERITONEUM: No paraspinal mass is seen. Evidence of previous gastric bypass surgery. Hepatic steatosis. IVC filter noted.      1.  No acute osseous abnormality in the - unselect if not a suspected or confirmed emergency medical condition->Emergency Medical Condition (MA) Is the patient pregnant?->No Reason for Exam: headache s/p fall FINDINGS: CTA NECK: AORTIC ARCH/ARCH VESSELS: No dissection or arterial injury. No significant stenosis of the brachiocephalic or subclavian arteries. CAROTID ARTERIES: No dissection, arterial injury, or hemodynamically significant stenosis by NASCET criteria. Minimal carotid calcifications are noted VERTEBRAL ARTERIES: No dissection, arterial injury, or significant stenosis. There is a mild-to-moderate stenosis at the origin of the right vertebral artery. SOFT TISSUES: The lung apices are clear. No cervical or superior mediastinal lymphadenopathy. The larynx and pharynx are unremarkable. No acute abnormality of the salivary and thyroid glands. BONES: No acute osseous abnormality. CTA HEAD: ANTERIOR CIRCULATION: No significant stenosis of the intracranial internal carotid, anterior cerebral, or middle cerebral arteries. No aneurysm. There is a right-sided posterior communicating artery. POSTERIOR CIRCULATION: No significant stenosis of the vertebral, basilar, or posterior cerebral arteries. No aneurysm. OTHER: No dural venous sinus thrombosis on this non-dedicated study. BRAIN: No mass effect or midline shift. No extra-axial fluid collection. The gray-white differentiation is maintained. No acute intracranial abnormality visualized. No flow limiting stenosis or large vessel occlusion detected within the head or neck. Consultations:    Consults:     Final Specialist Recommendations/Findings:   IP CONSULT TO SOCIAL WORK  IP CONSULT TO INTERNAL MEDICINE  IP CONSULT TO SOCIAL WORK      The patient was seen and examined on day of discharge and this discharge summary is in conjunction with any daily progress note from day of discharge.     Discharge plan:       Disposition: Home    Physician Follow Up:     Thaddeus Hackett [address] [facility] [address]  [address] [facility] [address]  [phone]    Schedule an appointment as soon as possible for a visit in 1 week(s)         Requiring Further Evaluation/Follow Up POST HOSPITALIZATION/Incidental Findings: none    Diet: regular diet    Activity: As tolerated    Discharge Medications:      Medication List        CHANGE how you take these medications      insulin glargine 100 UNIT/ML injection vial  Commonly known as: LANTUS  Inject 20 Units into the skin daily  What changed: how much to take            CONTINUE taking these medications      acetaminophen 500 MG tablet  Commonly known as: Tylenol  Take 2 tablets by mouth every 8 hours as needed for Pain     blood glucose test strips  Testing twice a day. Please dispense according to patients insurance. cyanocobalamin 1000 MCG/ML injection  Inject 1 mL into the muscle every 7 days     dilTIAZem 360 MG extended release capsule  Commonly known as: CARDIZEM CD     famotidine 20 MG tablet  Commonly known as: PEPCID  TAKE 1 TABLET BY MOUTH TWICE DAILY     Farxiga 10 MG tablet  Generic drug: dapagliflozin  TAKE 1 TABLET BY MOUTH EVERY MORNING     FLUoxetine 20 MG capsule  Commonly known as: PROZAC  Take 2 capsules by mouth daily     folic acid 1 MG tablet  Commonly known as: FOLVITE  Take 1 tablet by mouth daily     fondaparinux 10 MG/0.8ML Soln injection  Commonly known as: ARIXTRA     glucose monitoring kit  Testing twice a day. Please dispense according to patients insurance. HYDROcodone-acetaminophen 5-325 MG per tablet  Commonly known as: NORCO     insulin lispro 100 UNIT/ML Soln injection vial  Commonly known as: HUMALOG  **Medium Dose Corrective Algorithm**  Glucose: Dose:  If <139             No Insulin  140-199 2 Units  200-249 4 Units  250-299 6 Units  300-349 8 Units  350-400 10 Units  Above 400       12 Units     Insulin Pen Needle 31G X 5 MM Misc  1 each by Does not apply route daily     Lancets 30G Misc  Testing twice a day.   Please dispense according to patients insurance. losartan 50 MG tablet  Commonly known as: COZAAR     metoprolol succinate 25 MG extended release tablet  Commonly known as: TOPROL XL  Take 1 tablet by mouth daily     miconazole 2 % powder  Commonly known as: MICOTIN  Apply topically 2 times daily. pravastatin 40 MG tablet  Commonly known as: PRAVACHOL  TAKE 1 TABLET(40 MG) BY MOUTH DAILY     SYRINGE 3CC/25GX1\" 25G X 1\" 3 ML Misc  To be used with B12 injections monthly     therapeutic multivitamin-minerals tablet     vitamin B-1 100 MG tablet  Commonly known as: THIAMINE  Take 1 tablet by mouth daily     vitamin D 1000 UNIT Tabs tablet  Commonly known as: CHOLECALCIFEROL            STOP taking these medications      traZODone 100 MG tablet  Commonly known as: DESYREL               Where to Get Your Medications        These medications were sent to [facility] [address] [place] [address] [phone] [address] [facility] [address]      Phone: [phone]   insulin glargine 100 UNIT/ML injection vial         Electronically signed by   Annelise Hernandez MD  12/13/2022  5:15 PM      Thank you Dr. Tonny Gardner for the opportunity to be involved in this patient's care. Attending Physician Statement  I have discussed the care of Donta Sanchez and I have examined the patient myselft and taken ros and hpi , including pertinent history and exam findings,  with the resident. I have reviewed the key elements of all parts of the encounter with the resident. I agree with the DC plan as documented by the resident.       Electronically signed by Luis M Daniel MD

## 2022-12-20 ENCOUNTER — HOSPITAL ENCOUNTER (OUTPATIENT)
Dept: MRI IMAGING | Age: 42
Discharge: HOME | End: 2022-12-22
Payer: MEDICARE
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
## 2022-12-20 DIAGNOSIS — I10 ESSENTIAL HYPERTENSION: ICD-10-CM

## 2022-12-20 DIAGNOSIS — D68.61 ANTIPHOSPHOLIPID SYNDROME (HCC): ICD-10-CM

## 2022-12-20 PROCEDURE — 70551 MRI BRAIN STEM W/O DYE: CPT

## 2022-12-20 PROCEDURE — 72141 MRI NECK SPINE W/O DYE: CPT

## 2022-12-20 PROCEDURE — 72146 MRI CHEST SPINE W/O DYE: CPT

## 2023-03-10 ENCOUNTER — HOSPITAL ENCOUNTER (INPATIENT)
Age: 43
LOS: 2 days | Discharge: PSYCHIATRIC OR SUBSTANCE USE FACILITY | DRG: 638 | End: 2023-03-12
Attending: EMERGENCY MEDICINE | Admitting: INTERNAL MEDICINE
Payer: MEDICARE

## 2023-03-10 DIAGNOSIS — R73.9 HYPERGLYCEMIA: Primary | ICD-10-CM

## 2023-03-10 DIAGNOSIS — R44.3 HALLUCINATIONS: ICD-10-CM

## 2023-03-10 DIAGNOSIS — E87.1 HYPONATREMIA: ICD-10-CM

## 2023-03-10 LAB
ACETAMINOPHEN LEVEL: <5 UG/ML (ref 10–30)
ALBUMIN SERPL-MCNC: 3.3 G/DL (ref 3.5–5.2)
ALP SERPL-CCNC: 179 U/L (ref 35–104)
ALT SERPL-CCNC: 59 U/L (ref 5–33)
AMPHETAMINE SCREEN URINE: NEGATIVE
ANION GAP SERPL CALCULATED.3IONS-SCNC: 12 MMOL/L (ref 9–17)
ANION GAP SERPL CALCULATED.3IONS-SCNC: 20 MMOL/L (ref 9–17)
AST SERPL-CCNC: 32 U/L
BARBITURATE SCREEN URINE: NEGATIVE
BENZODIAZEPINE SCREEN, URINE: NEGATIVE
BILIRUB SERPL-MCNC: 0.4 MG/DL (ref 0.3–1.2)
BUN SERPL-MCNC: 8 MG/DL (ref 6–20)
BUN SERPL-MCNC: 9 MG/DL (ref 6–20)
CALCIUM SERPL-MCNC: 8.3 MG/DL (ref 8.6–10.4)
CALCIUM SERPL-MCNC: 8.7 MG/DL (ref 8.6–10.4)
CANNABINOID SCREEN URINE: NEGATIVE
CHLORIDE SERPL-SCNC: 89 MMOL/L (ref 98–107)
CHLORIDE SERPL-SCNC: 96 MMOL/L (ref 98–107)
CO2 SERPL-SCNC: 19 MMOL/L (ref 20–31)
CO2 SERPL-SCNC: 26 MMOL/L (ref 20–31)
COCAINE METABOLITE, URINE: NEGATIVE
CREAT SERPL-MCNC: 0.6 MG/DL (ref 0.5–0.9)
CREAT SERPL-MCNC: 0.69 MG/DL (ref 0.5–0.9)
ETHANOL PERCENT: <0.01 %
ETHANOL: <10 MG/DL
FENTANYL URINE: NEGATIVE
GFR SERPL CREATININE-BSD FRML MDRD: >60 ML/MIN/1.73M2
GFR SERPL CREATININE-BSD FRML MDRD: >60 ML/MIN/1.73M2
GLUCOSE BLD-MCNC: 148 MG/DL (ref 65–105)
GLUCOSE BLD-MCNC: 170 MG/DL (ref 65–105)
GLUCOSE SERPL-MCNC: 156 MG/DL (ref 70–99)
GLUCOSE SERPL-MCNC: 367 MG/DL (ref 70–99)
HCT VFR BLD AUTO: 39.8 % (ref 36–46)
HGB BLD-MCNC: 12.8 G/DL (ref 12–16)
MCH RBC QN AUTO: 29.9 PG (ref 26–34)
MCHC RBC AUTO-ENTMCNC: 32.2 G/DL (ref 31–37)
MCV RBC AUTO: 92.7 FL (ref 80–100)
METHADONE SCREEN, URINE: NEGATIVE
OPIATES, URINE: NEGATIVE
OXYCODONE SCREEN URINE: NEGATIVE
PDW BLD-RTO: 13.2 % (ref 11.5–14.9)
PHENCYCLIDINE, URINE: NEGATIVE
PLATELET # BLD AUTO: 186 K/UL (ref 150–450)
PMV BLD AUTO: 11.2 FL (ref 6–12)
POTASSIUM SERPL-SCNC: 3.8 MMOL/L (ref 3.7–5.3)
POTASSIUM SERPL-SCNC: 3.8 MMOL/L (ref 3.7–5.3)
PROT SERPL-MCNC: 6.4 G/DL (ref 6.4–8.3)
RBC # BLD: 4.3 M/UL (ref 4–5.2)
SALICYLATE LEVEL: <1 MG/DL (ref 3–10)
SODIUM SERPL-SCNC: 128 MMOL/L (ref 135–144)
SODIUM SERPL-SCNC: 134 MMOL/L (ref 135–144)
TEST INFORMATION: NORMAL
TOXIC TRICYCLIC SC,BLOOD: ABNORMAL
TRICYCLIC ANTIDEP,URINE: NEGATIVE
TSH SERPL-ACNC: 1.52 UIU/ML (ref 0.3–5)
WBC # BLD AUTO: 8.4 K/UL (ref 3.5–11)

## 2023-03-10 PROCEDURE — 99285 EMERGENCY DEPT VISIT HI MDM: CPT

## 2023-03-10 PROCEDURE — G0480 DRUG TEST DEF 1-7 CLASSES: HCPCS

## 2023-03-10 PROCEDURE — 85027 COMPLETE CBC AUTOMATED: CPT

## 2023-03-10 PROCEDURE — 80179 DRUG ASSAY SALICYLATE: CPT

## 2023-03-10 PROCEDURE — 80307 DRUG TEST PRSMV CHEM ANLYZR: CPT

## 2023-03-10 PROCEDURE — 36415 COLL VENOUS BLD VENIPUNCTURE: CPT

## 2023-03-10 PROCEDURE — 2060000000 HC ICU INTERMEDIATE R&B

## 2023-03-10 PROCEDURE — 96372 THER/PROPH/DIAG INJ SC/IM: CPT

## 2023-03-10 PROCEDURE — 2580000003 HC RX 258: Performed by: NURSE PRACTITIONER

## 2023-03-10 PROCEDURE — 2580000003 HC RX 258: Performed by: EMERGENCY MEDICINE

## 2023-03-10 PROCEDURE — 80053 COMPREHEN METABOLIC PANEL: CPT

## 2023-03-10 PROCEDURE — 6370000000 HC RX 637 (ALT 250 FOR IP): Performed by: EMERGENCY MEDICINE

## 2023-03-10 PROCEDURE — 82947 ASSAY GLUCOSE BLOOD QUANT: CPT

## 2023-03-10 PROCEDURE — 80143 DRUG ASSAY ACETAMINOPHEN: CPT

## 2023-03-10 PROCEDURE — 84443 ASSAY THYROID STIM HORMONE: CPT

## 2023-03-10 PROCEDURE — 80048 BASIC METABOLIC PNL TOTAL CA: CPT

## 2023-03-10 RX ORDER — LORAZEPAM 1 MG/1
1 TABLET ORAL DAILY PRN
Status: ON HOLD | COMMUNITY

## 2023-03-10 RX ORDER — INSULIN LISPRO 100 [IU]/ML
6 INJECTION, SOLUTION INTRAVENOUS; SUBCUTANEOUS ONCE
Status: COMPLETED | OUTPATIENT
Start: 2023-03-10 | End: 2023-03-10

## 2023-03-10 RX ORDER — FONDAPARINUX SODIUM 10 MG/.8ML
10 INJECTION SUBCUTANEOUS DAILY
Status: DISCONTINUED | OUTPATIENT
Start: 2023-03-11 | End: 2023-03-12 | Stop reason: HOSPADM

## 2023-03-10 RX ORDER — ACYCLOVIR 200 MG/1
400 CAPSULE ORAL 3 TIMES DAILY
Status: DISCONTINUED | OUTPATIENT
Start: 2023-03-10 | End: 2023-03-12 | Stop reason: HOSPADM

## 2023-03-10 RX ORDER — METOPROLOL SUCCINATE 25 MG/1
25 TABLET, EXTENDED RELEASE ORAL DAILY
Status: DISCONTINUED | OUTPATIENT
Start: 2023-03-11 | End: 2023-03-12 | Stop reason: HOSPADM

## 2023-03-10 RX ORDER — SODIUM CHLORIDE 0.9 % (FLUSH) 0.9 %
5-40 SYRINGE (ML) INJECTION EVERY 12 HOURS SCHEDULED
Status: DISCONTINUED | OUTPATIENT
Start: 2023-03-10 | End: 2023-03-12 | Stop reason: HOSPADM

## 2023-03-10 RX ORDER — VALACYCLOVIR HYDROCHLORIDE 500 MG/1
500 TABLET, FILM COATED ORAL DAILY
Status: ON HOLD | COMMUNITY

## 2023-03-10 RX ORDER — LORAZEPAM 2 MG/ML
1 INJECTION INTRAMUSCULAR
Status: DISCONTINUED | OUTPATIENT
Start: 2023-03-10 | End: 2023-03-12 | Stop reason: HOSPADM

## 2023-03-10 RX ORDER — LOSARTAN POTASSIUM 50 MG/1
50 TABLET ORAL DAILY
Status: DISCONTINUED | OUTPATIENT
Start: 2023-03-11 | End: 2023-03-12 | Stop reason: HOSPADM

## 2023-03-10 RX ORDER — FOLIC ACID 1 MG/1
1 TABLET ORAL DAILY
Status: DISCONTINUED | OUTPATIENT
Start: 2023-03-11 | End: 2023-03-12 | Stop reason: HOSPADM

## 2023-03-10 RX ORDER — LORAZEPAM 1 MG/1
4 TABLET ORAL
Status: DISCONTINUED | OUTPATIENT
Start: 2023-03-10 | End: 2023-03-12 | Stop reason: HOSPADM

## 2023-03-10 RX ORDER — DEXTROSE MONOHYDRATE 100 MG/ML
INJECTION, SOLUTION INTRAVENOUS CONTINUOUS PRN
Status: DISCONTINUED | OUTPATIENT
Start: 2023-03-10 | End: 2023-03-12 | Stop reason: HOSPADM

## 2023-03-10 RX ORDER — LORAZEPAM 1 MG/1
2 TABLET ORAL
Status: DISCONTINUED | OUTPATIENT
Start: 2023-03-10 | End: 2023-03-12 | Stop reason: HOSPADM

## 2023-03-10 RX ORDER — 0.9 % SODIUM CHLORIDE 0.9 %
1000 INTRAVENOUS SOLUTION INTRAVENOUS ONCE
Status: COMPLETED | OUTPATIENT
Start: 2023-03-10 | End: 2023-03-10

## 2023-03-10 RX ORDER — LORAZEPAM 2 MG/ML
4 INJECTION INTRAMUSCULAR
Status: DISCONTINUED | OUTPATIENT
Start: 2023-03-10 | End: 2023-03-12 | Stop reason: HOSPADM

## 2023-03-10 RX ORDER — FLUOXETINE HYDROCHLORIDE 20 MG/1
40 CAPSULE ORAL DAILY
Status: DISCONTINUED | OUTPATIENT
Start: 2023-03-11 | End: 2023-03-12 | Stop reason: HOSPADM

## 2023-03-10 RX ORDER — SODIUM CHLORIDE 9 MG/ML
INJECTION, SOLUTION INTRAVENOUS PRN
Status: DISCONTINUED | OUTPATIENT
Start: 2023-03-10 | End: 2023-03-12 | Stop reason: HOSPADM

## 2023-03-10 RX ORDER — DILTIAZEM HYDROCHLORIDE 180 MG/1
360 CAPSULE, COATED, EXTENDED RELEASE ORAL DAILY
Status: DISCONTINUED | OUTPATIENT
Start: 2023-03-11 | End: 2023-03-12 | Stop reason: HOSPADM

## 2023-03-10 RX ORDER — GAUZE BANDAGE 2" X 2"
100 BANDAGE TOPICAL DAILY
Status: DISCONTINUED | OUTPATIENT
Start: 2023-03-11 | End: 2023-03-12 | Stop reason: HOSPADM

## 2023-03-10 RX ORDER — LORAZEPAM 1 MG/1
1 TABLET ORAL ONCE
Status: COMPLETED | OUTPATIENT
Start: 2023-03-10 | End: 2023-03-10

## 2023-03-10 RX ORDER — INSULIN GLARGINE 100 [IU]/ML
24 INJECTION, SOLUTION SUBCUTANEOUS DAILY
Status: DISCONTINUED | OUTPATIENT
Start: 2023-03-11 | End: 2023-03-12 | Stop reason: HOSPADM

## 2023-03-10 RX ORDER — SODIUM CHLORIDE 0.9 % (FLUSH) 0.9 %
10 SYRINGE (ML) INJECTION PRN
Status: DISCONTINUED | OUTPATIENT
Start: 2023-03-10 | End: 2023-03-12 | Stop reason: HOSPADM

## 2023-03-10 RX ORDER — LORAZEPAM 2 MG/ML
3 INJECTION INTRAMUSCULAR
Status: DISCONTINUED | OUTPATIENT
Start: 2023-03-10 | End: 2023-03-12 | Stop reason: HOSPADM

## 2023-03-10 RX ORDER — PRAVASTATIN SODIUM 40 MG
40 TABLET ORAL DAILY
Status: DISCONTINUED | OUTPATIENT
Start: 2023-03-11 | End: 2023-03-12 | Stop reason: HOSPADM

## 2023-03-10 RX ORDER — M-VIT,TX,IRON,MINS/CALC/FOLIC 27MG-0.4MG
1 TABLET ORAL DAILY
Status: DISCONTINUED | OUTPATIENT
Start: 2023-03-11 | End: 2023-03-12 | Stop reason: HOSPADM

## 2023-03-10 RX ORDER — ONDANSETRON 4 MG/1
4 TABLET, ORALLY DISINTEGRATING ORAL EVERY 8 HOURS PRN
Status: DISCONTINUED | OUTPATIENT
Start: 2023-03-10 | End: 2023-03-12 | Stop reason: HOSPADM

## 2023-03-10 RX ORDER — ONDANSETRON 2 MG/ML
4 INJECTION INTRAMUSCULAR; INTRAVENOUS EVERY 6 HOURS PRN
Status: DISCONTINUED | OUTPATIENT
Start: 2023-03-10 | End: 2023-03-12 | Stop reason: HOSPADM

## 2023-03-10 RX ORDER — LORAZEPAM 1 MG/1
1 TABLET ORAL
Status: DISCONTINUED | OUTPATIENT
Start: 2023-03-10 | End: 2023-03-12 | Stop reason: HOSPADM

## 2023-03-10 RX ORDER — LORAZEPAM 2 MG/ML
2 INJECTION INTRAMUSCULAR
Status: DISCONTINUED | OUTPATIENT
Start: 2023-03-10 | End: 2023-03-12 | Stop reason: HOSPADM

## 2023-03-10 RX ORDER — INSULIN GLARGINE 300 U/ML
30 INJECTION, SOLUTION SUBCUTANEOUS EVERY MORNING
Status: ON HOLD | COMMUNITY

## 2023-03-10 RX ORDER — LORAZEPAM 1 MG/1
3 TABLET ORAL
Status: DISCONTINUED | OUTPATIENT
Start: 2023-03-10 | End: 2023-03-12 | Stop reason: HOSPADM

## 2023-03-10 RX ORDER — INSULIN LISPRO 100 [IU]/ML
0-8 INJECTION, SOLUTION INTRAVENOUS; SUBCUTANEOUS
Status: DISCONTINUED | OUTPATIENT
Start: 2023-03-11 | End: 2023-03-12 | Stop reason: HOSPADM

## 2023-03-10 RX ORDER — INSULIN LISPRO 100 [IU]/ML
0-4 INJECTION, SOLUTION INTRAVENOUS; SUBCUTANEOUS NIGHTLY
Status: DISCONTINUED | OUTPATIENT
Start: 2023-03-10 | End: 2023-03-12 | Stop reason: HOSPADM

## 2023-03-10 RX ADMIN — SODIUM CHLORIDE 1000 ML: 9 INJECTION, SOLUTION INTRAVENOUS at 20:23

## 2023-03-10 RX ADMIN — SODIUM CHLORIDE, PRESERVATIVE FREE 10 ML: 5 INJECTION INTRAVENOUS at 23:39

## 2023-03-10 RX ADMIN — INSULIN LISPRO 6 UNITS: 100 INJECTION, SOLUTION INTRAVENOUS; SUBCUTANEOUS at 20:25

## 2023-03-10 RX ADMIN — LORAZEPAM 1 MG: 1 TABLET ORAL at 20:15

## 2023-03-10 ASSESSMENT — ENCOUNTER SYMPTOMS
TROUBLE SWALLOWING: 0
COUGH: 0
SINUS PRESSURE: 0
SHORTNESS OF BREATH: 0
CONSTIPATION: 0
BLOOD IN STOOL: 0
EYE REDNESS: 0
BACK PAIN: 0
EYE DISCHARGE: 0
ABDOMINAL PAIN: 0
DIARRHEA: 0
RHINORRHEA: 0
EYE PAIN: 0
WHEEZING: 0
VOMITING: 0
NAUSEA: 0
FACIAL SWELLING: 0
SORE THROAT: 0
CHEST TIGHTNESS: 0
COLOR CHANGE: 0

## 2023-03-10 ASSESSMENT — PAIN - FUNCTIONAL ASSESSMENT: PAIN_FUNCTIONAL_ASSESSMENT: 0-10

## 2023-03-10 ASSESSMENT — PAIN SCALES - GENERAL: PAINLEVEL_OUTOF10: 6

## 2023-03-10 NOTE — ED PROVIDER NOTES
[address] [facility] ED  eMERGENCY dEPARTMENT eNCOUnter      Pt Name: Mohamud Tenorio  MRN: [id number]  [place] 1980  Date of evaluation: 3/10/23      CHIEF COMPLAINT       Chief Complaint   Patient presents with    Hallucinations         HISTORY OF PRESENT ILLNESS    Mohamud Tenorio is a 36 y.o. female who presents complaining of hallucinations. Patient states last weekend she started having a lot of voices talking to her telling her that people were outside and so she believed them and would open the door and look. Patient states she also sees things that she knows is not real but she still believes that they are. Patient states these have been getting a lot worse ever since her best friend told her last week that she was moving to a different state. Patient does have a history of bipolar and she does have multiple medications which she does take. Patient states that she does drink alcohol but she has not really been drinking much in the last 4 days. Patient states that hallucinations started well before she decreased her alcohol intake. Patient states she has a little bit of kind of anxiety and abdominal cramping related to the withdrawal she thinks. Patient states otherwise the only other somatic complaint is some neck and upper back pain that she has been having but she states that is chronic. Patient states that she is not having any drug use. Patient denies any suicidal or homicidal ideation. REVIEW OF SYSTEMS       Review of Systems   Constitutional:  Positive for appetite change. Negative for activity change, chills, diaphoresis and fever. HENT:  Negative for congestion, ear pain, facial swelling, nosebleeds, rhinorrhea, sinus pressure, sore throat and trouble swallowing. Eyes:  Negative for pain, discharge and redness. Respiratory:  Negative for cough, chest tightness, shortness of breath and wheezing. Cardiovascular:  Negative for chest pain, palpitations and leg swelling.   Gastrointestinal:  Negative for abdominal pain, blood in stool, constipation, diarrhea, nausea and vomiting.   Genitourinary:  Negative for difficulty urinating, dysuria, flank pain, frequency, genital sores and hematuria.   Musculoskeletal:  Negative for arthralgias, back pain, gait problem, joint swelling, myalgias and neck pain.   Skin:  Negative for color change, pallor, rash and wound.   Neurological:  Negative for dizziness, tremors, seizures, syncope, speech difficulty, weakness, numbness and headaches.   Psychiatric/Behavioral:  Positive for hallucinations and sleep disturbance. Negative for confusion, decreased concentration, self-injury and suicidal ideas.      PAST MEDICAL HISTORY     Past Medical History:   Diagnosis Date    Alcohol abuse     sober [id number]    Anxiety     Arthritis     Painting esophagus     Benzodiazepine overdose 9/26/2015    Bipolar disorder, unspecified ([facility])     Bipolar I disorder, most recent episode depressed, severe without psychotic features ([facility])     Cellulitis 11/17/2018    Chronic abdominal wound infection 9/27/2015    Constipation 9/3/2019    Depression 7/12/2015    Drug overdose, intentional ([facility]) 7/12/2015    Genital herpes, unspecified     GERD (gastroesophageal reflux disease)     History of pulmonary embolism     Hx of blood clots dx 2 years ago    clots in both legs and lungs     Hypertension     Iron deficiency anemia     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 resistance 2010    MRSA (abd)    Miscarriage     multiple, around 7th mos pregnant each time d/t hypercoagulation    Morbid obesity ([facility])     MRSA (methicillin resistant staph aureus) culture positive 02/10/2017    urine    Overdose of benzodiazepine     Pernicious anemia     Primary hypercoagulable state ([facility])     antiphospholipid antibodies on Arixtra shots daily    Pulmonary embolism ([facility]) 2010    Suicidal behavior  12/14/2015    Suicidal ideation     Suicide attempt ([facility] [address] 2014    hx OD on painkillers and rubbing alcohol    SVT (supraventricular tachycardia) ([facility] [address] 04/07/2017    Toxic effect of ethanol, intentional self-harm ([facility] [address] 12/16/2015    Type 2 diabetes mellitus, with long-term current use of insulin [facility])        SURGICAL HISTORY       Past Surgical History:   Procedure Laterality Date    ABDOMINAL HERNIA REPAIR  2014    wound vac    ABDOMINAL HERNIA REPAIR  11/3/14    BARIATRIC SURGERY  2013    [address] [facility]    CHOLECYSTECTOMY      DILATION AND CURETTAGE OF UTERUS  2005    ENDOSCOPY, COLON, DIAGNOSTIC      EGD    FINGER AMPUTATION Left 02/09/2015    index and ring finger. from frostbite    HERNIA REPAIR      total of 8    IVC FILTER INSERTION      LIVER RESECTION      for hepatic adenoma    LYMPH NODE BIOPSY  1990    JUSTINE AND BSO (CERVIX REMOVED)  2011    TONSILLECTOMY         CURRENT MEDICATIONS       Previous Medications    ACETAMINOPHEN (TYLENOL) 500 MG TABLET    Take 2 tablets by mouth every 8 hours as needed for Pain    BLOOD GLUCOSE MONITOR STRIPS    Testing twice a day. Please dispense according to patients insurance. CYANOCOBALAMIN 1000 MCG/ML INJECTION    Inject 1 mL into the muscle every 7 days    DILTIAZEM (CARDIZEM CD) 360 MG EXTENDED RELEASE CAPSULE    Take 360 mg by mouth daily    FAMOTIDINE (PEPCID) 20 MG TABLET    TAKE 1 TABLET BY MOUTH TWICE DAILY    FARXIGA 10 MG TABLET    TAKE 1 TABLET BY MOUTH EVERY MORNING    FLUOXETINE (PROZAC) 20 MG CAPSULE    Take 2 capsules by mouth daily    FOLIC ACID (FOLVITE) 1 MG TABLET    Take 1 tablet by mouth daily    FONDAPARINUX (ARIXTRA) 10 MG/0.8ML SOLN INJECTION    Inject 10 mg into the skin daily    GLUCOSE MONITORING KIT (FREESTYLE) MONITORING KIT    Testing twice a day. Please dispense according to patients insurance. HYDROCODONE-ACETAMINOPHEN (NORCO) 5-325 MG PER TABLET    Take 1 tablet by mouth every 6 hours as needed for Pain.  Indications: Last filled 5/24/22 for 30 days    INSULIN GLARGINE (LANTUS) 100 UNIT/ML INJECTION VIAL    Inject 20 Units into the skin daily    INSULIN GLARGINE, 1 UNIT DIAL, (TOUJEO SOLOSTAR) 300 UNIT/ML CONCENTRATED INJECTION PEN    Inject 30 Units into the skin every morning    INSULIN LISPRO (HUMALOG) 100 UNIT/ML SOLN INJECTION VIAL    **Medium Dose Corrective Algorithm**  Glucose: Dose:  If <139             No Insulin  140-199 2 Units  200-249 4 Units  250-299 6 Units  300-349 8 Units  350-400 10 Units  Above 400       12 Units    INSULIN PEN NEEDLE 31G X 5 MM MISC    1 each by Does not apply route daily    LANCETS 30G MISC    Testing twice a day. Please dispense according to patients insurance. LOSARTAN (COZAAR) 50 MG TABLET    Take 50 mg by mouth daily    METOPROLOL SUCCINATE (TOPROL XL) 25 MG EXTENDED RELEASE TABLET    Take 1 tablet by mouth daily    MICONAZOLE (MICOTIN) 2 % POWDER    Apply topically 2 times daily. MULTIPLE VITAMINS-MINERALS (THERAPEUTIC MULTIVITAMIN-MINERALS) TABLET    Take 1 tablet by mouth daily    PRAVASTATIN (PRAVACHOL) 40 MG TABLET    TAKE 1 TABLET(40 MG) BY MOUTH DAILY    SYRINGE/NEEDLE, DISP, (SYRINGE 3CC/25GX1\") 25G X 1\" 3 ML MISC    To be used with B12 injections monthly    THIAMINE MONONITRATE (THIAMINE) 100 MG TABLET    Take 1 tablet by mouth daily    VALACYCLOVIR (VALTREX) 500 MG TABLET    Take 500 mg by mouth daily    VITAMIN D (CHOLECALCIFEROL) 1000 UNIT TABS TABLET    Take 1,000 Units by mouth daily        ALLERGIES     is allergic to asa [aspirin], betadine [povidone iodine], celexa [citalopram hydrobromide], citalopram, lasix [furosemide], macrobid [nitrofurantoin], norco [hydrocodone-acetaminophen], betadine [povidone iodine], codeine, lithium, paroxetine, paxil [paroxetine hcl], tape [adhesive tape], and tegretol [carbamazepine]. SOCIAL HISTORY      reports that she has never smoked.  She has never used smokeless tobacco. She reports that she does not drink alcohol and does not use drugs.    PHYSICAL EXAM     INITIAL VITALS: BP (!) 135/110   Pulse (!) 103   Temp 97.7 °F (36.5 °C) (Oral)   Resp 18   Ht 5' 8\" (1.727 m)   Wt 240 lb (108.9 kg)   SpO2 100%   BMI 36.49 kg/m²      Physical Exam  Constitutional:       General: She is not in acute distress.     Appearance: She is well-developed. She is not diaphoretic.   HENT:      Head: Normocephalic and atraumatic.   Eyes:      General: No scleral icterus.        Right eye: No discharge.         Left eye: No discharge.      Conjunctiva/sclera: Conjunctivae normal.      Pupils: Pupils are equal, round, and reactive to light.   Cardiovascular:      Rate and Rhythm: Normal rate and regular rhythm.      Heart sounds: Normal heart sounds. No murmur heard.    No friction rub. No gallop.   Pulmonary:      Effort: Pulmonary effort is normal. No respiratory distress.      Breath sounds: Normal breath sounds. No wheezing or rales.   Neurological:      Mental Status: She is alert and oriented to person, place, and time.   Psychiatric:         Mood and Affect: Mood is depressed. Affect is flat.         Speech: Speech is delayed.         Behavior: Behavior is slowed and withdrawn.         Thought Content: Thought content is delusional. Thought content does not include homicidal or suicidal ideation.         MEDICAL DECISION MAKING:     Summary of Patient Presentation:        1)  Number and Complexity of Problems  Problem List This Visit:  Hallucinations    Differential Diagnosis:  Alcohol related versus increase stressors    Diagnoses Considered but Do Not Suspect:  None    Pertinent Comorbid Conditions:  Bipolar, alcohol abuse    2)  Data Reviewed  Decision Rules/Scores/MIPS utilized:  None    External Documents Reviewed:  None    Imaging that is independently reviewed and interpreted by me as:  None    See more data below for the lab and radiology tests and orders.    3)  Treatment and Disposition      \"ED Course\" Notes From Epic Narrator:  ED Course as  of 03/10/23 2045   Fri Mar 10, 2023   2044 Had a discussion with the patient and family because the elevated glucose and low sodium I felt more comfortable admitting medically treating the sugar and the sodium with some fluids and insulin, monitoring medically overnight and then have psychiatry see her in the morning. Patient and family are okay with this plan. Miguelito Rowell the nurse practitioner for the admitting team agrees to the admission. [JL]      ED Course User Index  [JL] Salud Pereira MD         PROCEDURES:  None      DATA FOR LAB AND RADIOLOGY TESTS ORDERED BELOW ARE REVIEWED BY THE ED CLINICIAN:    RADIOLOGY: All x-rays, CT, MRI, and formal ultrasound images (except ED bedside ultrasound) are read by the radiologist, see reports below, unless otherwise noted in MDM or here. Reports below are reviewed by myself. No orders to display       LABS: Lab orders shown below, the results are reviewed by myself, and all abnormals are listed below.   Labs Reviewed   TOX SCR, BLD, ED - Abnormal; Notable for the following components:       Result Value    Acetaminophen Level <5 (*)     Salicylate Lvl <1 (*)     All other components within normal limits   COMPREHENSIVE METABOLIC PANEL - Abnormal; Notable for the following components:    Glucose 367 (*)     Sodium 128 (*)     Chloride 89 (*)     CO2 19 (*)     Anion Gap 20 (*)     Alkaline Phosphatase 179 (*)     ALT 59 (*)     AST 32 (*)     Albumin 3.3 (*)     All other components within normal limits   CBC   URINE DRUG SCREEN   TSH WITH REFLEX   DRUG SCREEN TRICYCLIC URINE       Vitals Reviewed:    Vitals:    03/10/23 1812 03/10/23 1910   BP: (!) 135/110    Pulse: (!) 103 (!) 103   Resp: 18    Temp: 97.7 °F (36.5 °C)    TempSrc: Oral    SpO2: 100%    Weight: 240 lb (108.9 kg)    Height: 5' 8\" (1.727 m)      MEDICATIONS GIVEN TO PATIENT THIS ENCOUNTER:  Orders Placed This Encounter   Medications    LORazepam (ATIVAN) tablet 1 mg    0.9 % sodium chloride bolus    insulin lispro (HUMALOG) injection vial 6 Units     DISCHARGE PRESCRIPTIONS:  New Prescriptions    No medications on file     PHYSICIAN CONSULTS ORDERED THIS ENCOUNTER:  IP CONSULT TO PRIMARY CARE PROVIDER    FINAL IMPRESSION      1. Hyperglycemia    2. Hyponatremia    3. Hallucinations          DISPOSITION/PLAN   DISPOSITION Decision To Admit 03/10/2023 08:21:11 PM      PATIENT REFERREDTO:  No follow-up provider specified.     DISCHARGEMEDICATIONS:  New Prescriptions    No medications on file       (Please note that portions of this note were completed with a voice recognition program.  Efforts were made to edit thedictations but occasionally words are mis-transcribed.)    Susy Pope MD  Attending Emergency Physician                        Susy Pope MD  03/10/23 2045

## 2023-03-11 LAB
GLUCOSE BLD-MCNC: 199 MG/DL (ref 65–105)
GLUCOSE BLD-MCNC: 239 MG/DL (ref 65–105)
GLUCOSE BLD-MCNC: 274 MG/DL (ref 65–105)
GLUCOSE BLD-MCNC: 336 MG/DL (ref 65–105)

## 2023-03-11 PROCEDURE — 6370000000 HC RX 637 (ALT 250 FOR IP): Performed by: NURSE PRACTITIONER

## 2023-03-11 PROCEDURE — 2580000003 HC RX 258: Performed by: NURSE PRACTITIONER

## 2023-03-11 PROCEDURE — 2500000003 HC RX 250 WO HCPCS: Performed by: NURSE PRACTITIONER

## 2023-03-11 PROCEDURE — 2060000000 HC ICU INTERMEDIATE R&B

## 2023-03-11 PROCEDURE — 6360000002 HC RX W HCPCS: Performed by: NURSE PRACTITIONER

## 2023-03-11 PROCEDURE — 82947 ASSAY GLUCOSE BLOOD QUANT: CPT

## 2023-03-11 PROCEDURE — 99223 1ST HOSP IP/OBS HIGH 75: CPT | Performed by: INTERNAL MEDICINE

## 2023-03-11 RX ORDER — ACETAMINOPHEN 325 MG/1
650 TABLET ORAL EVERY 4 HOURS PRN
Status: DISCONTINUED | OUTPATIENT
Start: 2023-03-11 | End: 2023-03-12 | Stop reason: HOSPADM

## 2023-03-11 RX ADMIN — SODIUM CHLORIDE, PRESERVATIVE FREE 10 ML: 5 INJECTION INTRAVENOUS at 11:30

## 2023-03-11 RX ADMIN — MULTIPLE VITAMINS W/ MINERALS TAB 1 TABLET: TAB at 09:09

## 2023-03-11 RX ADMIN — PRAVASTATIN SODIUM 40 MG: 40 TABLET ORAL at 09:10

## 2023-03-11 RX ADMIN — ACYCLOVIR 400 MG: 200 CAPSULE ORAL at 17:06

## 2023-03-11 RX ADMIN — INSULIN LISPRO 6 UNITS: 100 INJECTION, SOLUTION INTRAVENOUS; SUBCUTANEOUS at 09:08

## 2023-03-11 RX ADMIN — FLUOXETINE 40 MG: 20 CAPSULE ORAL at 09:08

## 2023-03-11 RX ADMIN — THIAMINE HCL TAB 100 MG 100 MG: 100 TAB at 09:08

## 2023-03-11 RX ADMIN — SODIUM CHLORIDE, PRESERVATIVE FREE 10 ML: 5 INJECTION INTRAVENOUS at 21:41

## 2023-03-11 RX ADMIN — ANTI-FUNGAL POWDER MICONAZOLE NITRATE TALC FREE: 1.42 POWDER TOPICAL at 11:30

## 2023-03-11 RX ADMIN — FONDAPARINUX SODIUM 10 MG: 10 INJECTION, SOLUTION SUBCUTANEOUS at 11:06

## 2023-03-11 RX ADMIN — DILTIAZEM HYDROCHLORIDE 360 MG: 180 CAPSULE, COATED, EXTENDED RELEASE ORAL at 09:10

## 2023-03-11 RX ADMIN — ACYCLOVIR 400 MG: 200 CAPSULE ORAL at 11:05

## 2023-03-11 RX ADMIN — ACETAMINOPHEN 650 MG: 325 TABLET ORAL at 04:29

## 2023-03-11 RX ADMIN — INSULIN GLARGINE 24 UNITS: 100 INJECTION, SOLUTION SUBCUTANEOUS at 09:08

## 2023-03-11 RX ADMIN — LORAZEPAM 1 MG: 1 TABLET ORAL at 04:29

## 2023-03-11 RX ADMIN — ACYCLOVIR 400 MG: 200 CAPSULE ORAL at 21:41

## 2023-03-11 RX ADMIN — METOPROLOL SUCCINATE 25 MG: 25 TABLET, EXTENDED RELEASE ORAL at 09:09

## 2023-03-11 RX ADMIN — LOSARTAN POTASSIUM 50 MG: 50 TABLET, FILM COATED ORAL at 09:09

## 2023-03-11 RX ADMIN — INSULIN LISPRO 2 UNITS: 100 INJECTION, SOLUTION INTRAVENOUS; SUBCUTANEOUS at 17:05

## 2023-03-11 RX ADMIN — ACYCLOVIR 400 MG: 200 CAPSULE ORAL at 00:53

## 2023-03-11 RX ADMIN — LORAZEPAM 1 MG: 1 TABLET ORAL at 00:59

## 2023-03-11 RX ADMIN — FOLIC ACID 1 MG: 1 TABLET ORAL at 09:08

## 2023-03-11 RX ADMIN — ANTI-FUNGAL POWDER MICONAZOLE NITRATE TALC FREE: 1.42 POWDER TOPICAL at 21:41

## 2023-03-11 ASSESSMENT — PAIN SCALES - GENERAL: PAINLEVEL_OUTOF10: 4

## 2023-03-11 ASSESSMENT — PAIN DESCRIPTION - DESCRIPTORS: DESCRIPTORS: DISCOMFORT

## 2023-03-11 ASSESSMENT — PAIN DESCRIPTION - LOCATION: LOCATION: GENERALIZED

## 2023-03-11 NOTE — ED NOTES
Report given to   Adamaris Reeves RN from PCU   Report method by phone   The following was reviewed with receiving RN:   Current vital signs:  BP 97/62   Pulse 87   Temp 97.8 °F (36.6 °C) (Oral)   Resp 20   Ht 5' 8\" (1.727 m)   Wt 240 lb (108.9 kg)   SpO2 100%   BMI 36.49 kg/m²                MEWS Score: 2     Any medication or safety alerts were reviewed. Any pending diagnostics and notifications were also reviewed, as well as any safety concerns or issues, abnormal labs, abnormal imaging, and abnormal assessment findings. Questions were answered.           Julian Cervantes RN  03/10/23 2068

## 2023-03-11 NOTE — BH NOTE
Writer attempted to meet with patient to obtain a history to begin SANE exam as requested. Patient was lethargic and unable to stay awake and told writer she preferred to attempt the SANE exam in the morning. Attending nurse notified.

## 2023-03-11 NOTE — PROGRESS NOTES
Medication History completed:    New medications: Toujeo 30 units in the morning  Valacyclovir 500 mg QD    Medications discontinued: none    Medications flagged for review:  Lantus- patient is now using Toujeo  Norco- patient reports not taking, also on allergy list but says she is not sure she is really allergic  Famotidine- patient reports no longer taking    Changes to dosing: none    Stated allergies: as listed    Other pertinent information: Patient has taken her daily medications. Confirmed with patient as well as dispense report. Patient reports no other prescription medications, OTCs, supplements, or illicit drugs.     Lora Maurice- PharmD Candidate 1953

## 2023-03-11 NOTE — H&P
[facility] Internal Medicine  Moshe Alan MD; Lyndon Knowles MD; Ester Francis MD; MD Melany Nayak MD; MD MASON Hooker [facility] Internal Medicine   [facility]    HISTORY AND PHYSICAL EXAMINATION            Date:   3/11/2023  Patient name:  Derrick Lyle  Date of admission:  3/10/2023  6:13 PM  MRN:   [id number]  Account:  [de-identified]  YOB: 1980  PCP:    Mary Anne Tomlinson  Room:   2114/2114-01  Code Status:    Full Code    Chief Complaint:     Chief Complaint   Patient presents with    Hallucinations   Hyperglycemia    History Obtained From:     patient    History of Present Illness:     Derrick Lyle is a 36 y.o. Non- / non  female who presents with Hallucinations   and is admitted to the hospital for the management of Hyperglycemia. According to patient, she began having hallucinations approximately 1 week ago. Reports history of bipolar but states she does not usually have hallucinations. Reports history of alcohol abuse with periods of sobriety, but reports that she began daily drinking approximately 2 weeks ago. States that she recently drank 30 shots of fireball over a 6-hour period, but has down over the past couple days. Reports past history of alcohol withdrawal seizures and DTs but does not believe hallucinations are related to her alcohol withdrawal because the hallucinations started while she was still drinking daily. Symptoms are associated with headache and upper back pain, which patient reports as being chronic and unchanged. Denies fever, chills, chest pain, cough, abdominal pain, nausea, vomiting, diarrhea, and urinary symptoms. There are no aggravating or alleviating factors. Symptoms are reported as constant and moderate. While examining patient and completing orders, patient occasionally begins talking to someone who is not present.   At one point, patient began whispering her responses because she said her twin brother was standing right there and she did not want him to hear. ED physician reported that patient stated that she recently learned her best friend is moving out of state.         Patient status inpatient in the Progressive Unit/Step down      Past Medical History:     Past Medical History:   Diagnosis Date    Alcohol abuse     sober [id number]    Anxiety     Arthritis     Painting esophagus     Benzodiazepine overdose 9/26/2015    Bipolar disorder, unspecified ([address]     Bipolar I disorder, most recent episode depressed, severe without psychotic features ([address]     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 benzodiazepine     Pernicious anemia     Primary hypercoagulable state ([address]     antiphospholipid antibodies on Arixtra shots daily    Pulmonary embolism ([address] 2010    Suicidal behavior 12/14/2015    Suicidal ideation     Suicide attempt ([address] 2014    hx OD on painkillers and rubbing alcohol    SVT (supraventricular tachycardia) ([address] 04/07/2017    Toxic effect of ethanol, intentional self-harm ([address] 12/16/2015    Type 2 diabetes mellitus, with long-term current use of insulin ([address]         Past Surgical History:     Past Surgical History:   Procedure Laterality Date    ABDOMINAL HERNIA REPAIR  2014    wound vac    ABDOMINAL HERNIA REPAIR  11/3/14    BARIATRIC SURGERY  2013    [facility]    CHOLECYSTECTOMY      DILATION AND CURETTAGE OF UTERUS  2005    ENDOSCOPY, COLON, DIAGNOSTIC      EGD    FINGER AMPUTATION Left 02/09/2015    index and ring finger. from frostbite    HERNIA REPAIR      total of 8    IVC FILTER INSERTION      LIVER RESECTION      for hepatic adenoma    LYMPH NODE BIOPSY  1990    JUSTINE AND BSO (CERVIX REMOVED)  2011    TONSILLECTOMY          Medications Prior to Admission:     Prior to Admission medications    Medication Sig Start Date End Date Taking? Authorizing Provider   insulin glargine, 1 unit dial, (TOUJEO SOLOSTAR) 300 UNIT/ML concentrated injection pen Inject 30 Units into the skin every morning   Yes Historical Provider, MD   valACYclovir (VALTREX) 500 MG tablet Take 500 mg by mouth daily   Yes Historical Provider, MD   LORazepam (ATIVAN) 1 MG tablet Take 1 mg by mouth daily as needed for Anxiety. Indications: last filled 2/16/23 for 30 days   Yes Historical Provider, MD   fondaparinux (ARIXTRA) 10 MG/0.8ML SOLN injection Inject 10 mg into the skin daily 11/10/22   Historical Provider, MD   FLUoxetine (PROZAC) 20 MG capsule Take 2 capsules by mouth daily 6/12/22   Kaden Holt MD   insulin lispro (HUMALOG) 100 UNIT/ML SOLN injection vial **Medium Dose Corrective Algorithm**  Glucose: Dose:  If <139             No Insulin  140-199 2 Units  200-249 4 Units  250-299 6 Units  300-349 8 Units  350-400 10 Units  Above 400       12 Units 6/12/22   Kaden Holt MD   metoprolol succinate (TOPROL XL) 25 MG extended release tablet Take 1 tablet by mouth daily 6/13/22   Kaden Holt MD   miconazole (MICOTIN) 2 % powder Apply topically 2 times daily.  6/12/22   Kaden Holt MD   folic acid (FOLVITE) 1 MG tablet Take 1 tablet by mouth daily 6/13/22   Kaden Holt MD   thiamine mononitrate (THIAMINE) 100 MG tablet Take 1 tablet by mouth daily 6/13/22   Dimitris Cornejo MD   dilTIAZem (CARDIZEM CD) 360 MG extended release capsule Take 360 mg by mouth daily    Historical Provider, MD   losartan (COZAAR) 50 mg tablet Take 50 mg by mouth daily    Historical Provider, MD   cyanocobalamin 1000 MCG/ML injection Inject 1 mL into the muscle every 7 days 2/13/21   Estela Salinas MD   Syringe/Needle, Disp, (SYRINGE 3CC/25GX1\") 25G X 1\" 3 ML MISC To be used with B12 injections monthly 2/13/21   Estela Salinas MD   FARXIGA 10 MG tablet TAKE 1 TABLET BY MOUTH EVERY MORNING 11/2/20   Estela Salinas MD   pravastatin (PRAVACHOL) 40 MG tablet TAKE 1 TABLET(40 MG) BY MOUTH DAILY 6/26/20   Estela Salinas MD   acetaminophen (TYLENOL) 500 MG tablet Take 2 tablets by mouth every 8 hours as needed for Pain 2/19/20   Christopher Russo 1721, DO   glucose monitoring kit (FREESTYLE) monitoring kit Testing twice a day. Please dispense according to patients insurance. 12/20/19   Estela Salinas MD   Insulin Pen Needle 31G X 5 MM MISC 1 each by Does not apply route daily 12/20/19   Estela Salinas MD   Lancets 30G MISC Testing twice a day. Please dispense according to patients insurance. 12/20/19   Estela Salinas MD   blood glucose monitor strips Testing twice a day. Please dispense according to patients insurance.  12/20/19   Estela Salinas MD   Multiple Vitamins-Minerals (THERAPEUTIC MULTIVITAMIN-MINERALS) tablet Take 1 tablet by mouth daily    Historical Provider, MD   vitamin D (CHOLECALCIFEROL) 1000 UNIT TABS tablet Take 1,000 Units by mouth daily     Historical Provider, MD        Allergies:     Asa [aspirin], Betadine [povidone iodine], Celexa [citalopram hydrobromide], Citalopram, Lasix [furosemide], Macrobid [nitrofurantoin], Norco [hydrocodone-acetaminophen], Betadine [povidone iodine], Codeine, Lithium, Paroxetine, Paxil [paroxetine hcl], Tape [adhesive tape], and Tegretol [carbamazepine]    Social History:     Tobacco: reports that she has never smoked. She has never used smokeless tobacco.  Alcohol:      reports no history of alcohol use. Drug Use:  reports no history of drug use. Family History:     Family History   Problem Relation Age of Onset    Depression Mother     High Blood Pressure Mother     High Cholesterol Mother     Diabetes Father     Heart Disease Father     Kidney Disease Father     High Blood Pressure Father     High Cholesterol Father     Cancer Maternal Uncle         of duodenum had whipple    Breast Cancer Maternal Aunt     Breast Cancer Maternal Cousin        Review of Systems:     Positive and Negative as described in HPI.     Physical Exam:   BP (!) 96/47   Pulse 88   Temp 97.4 °F (36.3 °C) (Oral)   Resp 16   Ht 5' 8\" (1.727 m)   Wt 240 lb (108.9 kg)   SpO2 96%   BMI 36.49 kg/m²   Temp (24hrs), Av.8 °F (36.6 °C), Min:97.4 °F (36.3 °C), Max:98.3 °F (36.8 °C)    Recent Labs     03/10/23  2352 23  0857 23  1101 23  1609   POCGLU 148* 336* 199* 239*     No intake or output data in the 24 hours ending 23 1822    General Appearance: alert, well appearing, and in no acute distress  Mental status: oriented to person, place, and time  Lungs: Bilateral equal air entry, clear to ausculation, no wheezing, rales or rhonchi, normal effort  Cardiovascular: normal rate, regular rhythm, no murmur, gallop, rub  Abdomen: Soft, nontender, nondistended, normal bowel sounds, no hepatomegaly or splenomegaly  Neurologic: There are no new focal motor or sensory deficits, normal muscle tone and bulk, no abnormal sensation, normal speech, cranial nerves II through XII grossly intact  Skin: No gross lesions, rashes, bruising or bleeding on exposed skin area  Extremities: peripheral pulses palpable, no pedal edema or calf pain with palpation      Investigations:      Laboratory Testing:  Recent Results (from the past 24 hour(s))   Urine Drug Screen    Collection Time: 03/10/23  6:55 PM   Result Value Ref Range    Amphetamine Screen, Ur NEGATIVE NEGATIVE    Barbiturate Screen, Ur NEGATIVE NEGATIVE    Benzodiazepine Screen, Urine NEGATIVE NEGATIVE    Cocaine Metabolite, Urine NEGATIVE NEGATIVE    Methadone Screen, Urine NEGATIVE NEGATIVE    Opiates, Urine NEGATIVE NEGATIVE    Phencyclidine, Urine NEGATIVE NEGATIVE    Cannabinoid Scrn, Ur NEGATIVE NEGATIVE    Oxycodone Screen, Ur NEGATIVE NEGATIVE    Fentanyl, Ur NEGATIVE NEGATIVE    Test Information       Assay provides medical screening only. The absence of expected drug(s) and/or metabolite(s) may indicate diluted or adulterated urine, limitations of testing or timing of collection.    Drug screen, tricyclic    Collection Time: 03/10/23  6:55 PM   Result Value Ref Range    Tricyclic Antidep,Urine NEGATIVE NEGATIVE   TOX SCR, BLD, ED    Collection Time: 03/10/23  7:40 PM   Result Value Ref Range    Acetaminophen Level <5 (L) 10 - 30 ug/mL    Ethanol <10 <10 mg/dL    Ethanol percent <4.145 %    Salicylate Lvl <1 (L) 3 - 10 mg/dL    Toxic Tricyclic Sc,Blood WRONG TEST ORDERED NEGATIVE   CBC    Collection Time: 03/10/23  7:40 PM   Result Value Ref Range    WBC 8.4 3.5 - 11.0 k/uL    RBC 4.30 4.0 - 5.2 m/uL    Hemoglobin 12.8 12.0 - 16.0 g/dL    Hematocrit 39.8 36 - 46 %    MCV 92.7 80 - 100 fL    MCH 29.9 26 - 34 pg    MCHC 32.2 31 - 37 g/dL    RDW 13.2 11.5 - 14.9 %    Platelets 671 510 - 933 k/uL    MPV 11.2 6.0 - 12.0 fL   Comprehensive Metabolic Panel    Collection Time: 03/10/23  7:40 PM   Result Value Ref Range    Glucose 367 (H) 70 - 99 mg/dL    BUN 9 6 - 20 mg/dL    Creatinine 0.69 0.50 - 0.90 mg/dL    Est, Glom Filt Rate >60 >60 mL/min/1.73m2    Calcium 8.7 8.6 - 10.4 mg/dL    Sodium 128 (L) 135 - 144 mmol/L    Potassium 3.8 3.7 - 5.3 mmol/L    Chloride 89 (L) 98 - 107 mmol/L    CO2 19 (L) 20 - 31 mmol/L    Anion Gap 20 (H) 9 - 17 mmol/L    Alkaline Phosphatase 179 (H) 35 - 104 U/L    ALT 59 (H) 5 - 33 U/L    AST 32 (H) <32 U/L    Total Bilirubin 0.4 0.3 - 1.2 mg/dL    Total Protein 6.4 6.4 - 8.3 g/dL    Albumin 3.3 (L) 3.5 - 5.2 g/dL   TSH with Reflex    Collection Time: 03/10/23  7:40 PM   Result Value Ref Range    TSH 1.52 0.30 - 5.00 uIU/mL   POC Glucose Fingerstick    Collection Time: 03/10/23 10:14 PM   Result Value Ref Range    POC Glucose 170 (H) 65 - 105 mg/dL   Basic Metabolic Panel w/ Reflex to MG    Collection Time: 03/10/23 11:28 PM   Result Value Ref Range    Glucose 156 (H) 70 - 99 mg/dL    BUN 8 6 - 20 mg/dL    Creatinine 0.60 0.50 - 0.90 mg/dL    Est, Glom Filt Rate >60 >60 mL/min/1.73m2    Calcium 8.3 (L) 8.6 - 10.4 mg/dL    Sodium 134 (L) 135 - 144 mmol/L    Potassium 3.8 3.7 - 5.3 mmol/L    Chloride 96 (L) 98 - 107 mmol/L    CO2 26 20 - 31 mmol/L    Anion Gap 12 9 - 17 mmol/L   POC Glucose Fingerstick    Collection Time: 03/10/23 11:52 PM   Result Value Ref Range    POC Glucose 148 (H) 65 - 105 mg/dL   POC Glucose Fingerstick    Collection Time: 03/11/23  8:57 AM   Result Value Ref Range    POC Glucose 336 (H) 65 - 105 mg/dL   POC Glucose Fingerstick    Collection Time: 03/11/23 11:01 AM   Result Value Ref Range    POC Glucose 199 (H) 65 - 105 mg/dL   POC Glucose Fingerstick    Collection Time: 03/11/23  4:09 PM   Result Value Ref Range    POC Glucose 239 (H) 65 - 105 mg/dL       Imaging/Diagnostics:  No results found. Assessment :      Hospital Problems             Last Modified POA    * (Principal) Hyperglycemia 3/10/2023 Yes    Antiphospholipid syndrome ([address] 3/11/2023 Yes       Plan:     Patient status inpatient in the Progressive Unit/Step down    1. Diabetes Mellitus with hyperglycemia  -Glucose 367 on arrival; not DKA  -Reports that she took her daily dose of Toujeo this am  -- 6 units Humalog administered in ED  -Continue home dose insulin  -hold oral hypoglycemics/metformin for now  -POCT ac and hs with sliding scale coverage     Hallucinations  -Patient reports history of bipolar disorder  -1 week history of auditory and visual hallucinations  -UDS negative  -TSH 1.52  -Denies SI and HI  -Consult psych for possible inpatient treatment once medically clear     Hyponatremia  -Sodium level -128  --Patient reports history of hyponatremia after drinking too much  -1 L normal saline fluid bolus administered in ED  -Repeat BMP at midnight, then determine if additional IV fluids needed     Alcohol abuse  -Patient reports history of daily alcohol use for the past 2 weeks  -- Previously had history of sobriety  -Reports past history of alcohol withdrawal seizures and DTs  --Ethanol < 10 in ED  -thiamine and folic acid daily  -Ativan per CIWA scale  -Seizure precautions     Chronic Anticoagulation  -Patient anticoagulated d/t history of antiphospholipid syndrome  -Continue home dose Arixtra  -monitor for signs of bleeding      Disposition 2 days     2. Disposition       Consultations:   IP CONSULT TO PRIMARY CARE PROVIDER  IP CONSULT TO SOCIAL WORK  IP CONSULT TO PSYCHIATRY     Patient is admitted as inpatient status because of co-morbidities listed above, severity of signs and symptoms as outlined, requirement for current medical therapies and most importantly because of direct risk to patient if care not provided in a hospital setting. Expected length of stay > 48 hours. Campos Ng MD  3/11/2023  6:22 PM    Copy sent to Dr. Marcello Ramírez    Please note that this chart was generated using voice recognition Dragon dictation software. Although every effort was made to ensure the accuracy of this automated transcription, some errors in transcription may have occurred.

## 2023-03-11 NOTE — PROGRESS NOTES
Laure Mckenzie is a 36 y.o. Non- / non  female who presents with Hallucinations   and is admitted to the hospital for the management of Hyperglycemia. According to patient, she began having hallucinations approximately 1 week ago. Reports history of bipolar but states she does not usually have hallucinations. Reports history of alcohol abuse with periods of sobriety, but reports that she began daily drinking approximately 2 weeks ago. States that she recently drank 30 shots of fireball over a 6-hour period, but has down over the past couple days. Reports past history of alcohol withdrawal seizures and DTs but does not believe hallucinations are related to her alcohol withdrawal because the hallucinations started while she was still drinking daily. Symptoms are associated with headache and upper back pain, which patient reports as being chronic and unchanged. Denies fever, chills, chest pain, cough, abdominal pain, nausea, vomiting, diarrhea, and urinary symptoms. There are no aggravating or alleviating factors. Symptoms are reported as constant and moderate. While examining patient and completing orders, patient occasionally begins talking to someone who is not present. At one point, patient began whispering her responses because she said her twin brother was standing right there and she did not want him to hear. ED physician reported that patient stated that she recently learned her best friend is moving out of state.       Patient status inpatient in the Progressive Unit/Step down    Hospital Problems             Last Modified POA    * (Principal) Hyperglycemia 3/10/2023 Yes     Diabetes Mellitus with hyperglycemia  -Glucose 367 on arrival; not DKA  -Reports that she took her daily dose of Toujeo this am  -- 6 units Humalog administered in ED  -Continue home dose insulin  -hold oral hypoglycemics/metformin for now  -POCT ac and hs with sliding scale coverage    Hallucinations  -Patient reports history of bipolar disorder  -1 week history of auditory and visual hallucinations  -UDS negative  -TSH 1.52  -Denies SI and HI  -Consult psych for possible inpatient treatment once medically clear    Hyponatremia  -Sodium level -128  --Patient reports history of hyponatremia after drinking too much  -1 L normal saline fluid bolus administered in ED  -Repeat BMP at midnight, then determine if additional IV fluids needed    Alcohol abuse  -Patient reports history of daily alcohol use for the past 2 weeks  -- Previously had history of sobriety  -Reports past history of alcohol withdrawal seizures and DTs  --Ethanol < 10 in ED  -thiamine and folic acid daily  -Ativan per CIWA scale  -Seizure precautions    Chronic Anticoagulation  -Patient anticoagulated d/t history of antiphospholipid syndrome  -Continue home dose Arixtra  -monitor for signs of bleeding     Disposition 2 days      Consultations:   IP CONSULT TO PRIMARY CARE PROVIDER  IP CONSULT TO PSYCHIATRY    Patient is admitted as inpatient status because of co-morbidities listed above, severity of signs and symptoms as outlined, requirement for current medical therapies and most importantly because of direct risk to patient if care not provided in a hospital setting. Expected length of stay > 48 hours.     BERNARDA Urena - CNP  3/10/2023  9:22 PM
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
## 2023-03-11 NOTE — PLAN OF CARE
Problem: Safety - Adult  Goal: Free from fall injury  Outcome: Progressing  Note: No falls noted this shift. Patient ambulates with x1 staff assistance without difficulty. Bed kept in low position. Safe environment maintained. Bedside table & call light in reach. Uses call light appropriately when needing assistance. Problem: Confusion  Goal: Confusion, delirium, dementia, or psychosis is improved or at baseline  Description: INTERVENTIONS:  1. Assess for possible contributors to thought disturbance, including medications, impaired vision or hearing, underlying metabolic abnormalities, dehydration, psychiatric diagnoses, and notify attending LIP  2. [place] high risk fall precautions, as indicated  3. Provide frequent short contacts to provide reality reorientation, refocusing and direction  4. Decrease environmental stimuli, including noise as appropriate  5. Monitor and intervene to maintain adequate nutrition, hydration, elimination, sleep and activity  6. If unable to ensure safety without constant attention obtain sitter and review sitter guidelines with assigned personnel  7.  Initiate Psychosocial CNS and Spiritual Care consult, as indicated  Outcome: Progressing     Problem: Pain  Goal: Verbalizes/displays adequate comfort level or baseline comfort level  Outcome: Progressing     Problem: Discharge Planning  Goal: Discharge to home or other facility with appropriate resources  Outcome: Progressing

## 2023-03-11 NOTE — PROGRESS NOTES
RN clarified tylenol allergy that was listed in the chart. Patient states that she is not allergic to tylenol but \"it's one of the few that I can take\".      Electronically signed by Parvin Alcocer RN on 3/11/2023 at 6:34 AM
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
## 2023-03-11 NOTE — PROGRESS NOTES
Patient received from ED via wheelchair. Patient ambulated to bed. Vitals taken. Assessment completed. No distress noted. Patient has positive auditory and visual hallucinations. See doc flowsheet and transfer navigator for details. POC and education reviewed with patient. Call light within reach, and pt educated on its use. Bed in lowest position, and locked. Side rails up x 2. Denied further questions or needs at this time. Will continue to monitor.

## 2023-03-11 NOTE — PLAN OF CARE
Problem: Discharge Planning  Goal: Discharge to home or other facility with appropriate resources  Outcome: Progressing  Flowsheets (Taken 3/11/2023 1820)  Discharge to home or other facility with appropriate resources:   Identify barriers to discharge with patient and caregiver   Arrange for needed discharge resources and transportation as appropriate   Identify discharge learning needs (meds, wound care, etc)   Arrange for interpreters to assist at discharge as needed     Problem: Pain  Goal: Verbalizes/displays adequate comfort level or baseline comfort level  Outcome: Progressing  Flowsheets (Taken 3/11/2023 1820)  Verbalizes/displays adequate comfort level or baseline comfort level:   Encourage patient to monitor pain and request assistance   Assess pain using appropriate pain scale  Note: Patient denies pain this shift. Problem: Confusion  Goal: Confusion, delirium, dementia, or psychosis is improved or at baseline  Description: INTERVENTIONS:  1. Assess for possible contributors to thought disturbance, including medications, impaired vision or hearing, underlying metabolic abnormalities, dehydration, psychiatric diagnoses, and notify attending LIP  2. [place] high risk fall precautions, as indicated  3. Provide frequent short contacts to provide reality reorientation, refocusing and direction  4. Decrease environmental stimuli, including noise as appropriate  5. Monitor and intervene to maintain adequate nutrition, hydration, elimination, sleep and activity  6. If unable to ensure safety without constant attention obtain sitter and review sitter guidelines with assigned personnel  7.  Initiate Psychosocial CNS and Spiritual Care consult, as indicated  Outcome: Progressing  Flowsheets (Taken 3/11/2023 1820)  Effect of thought disturbance (confusion, delirium, dementia, or psychosis) are managed with adequate functional status:   [place] high risk fall precautions, as indicated   Monitor and intervene to maintain adequate nutrition, hydration, elimination, sleep and activity  Note: Patient is alert and oriented with auditory and visual hallucinations. Will continue to monitor for additional changes to patient mentation. Problem: ABCDS Injury Assessment  Goal: Absence of physical injury  Outcome: Progressing  Flowsheets (Taken 3/11/2023 1820)  Absence of Physical Injury: Implement safety measures based on patient assessment  Note: Fall assessment performed and appropriate measures implemented. Room freed from clutter. Bed in lowest position with wheels locked. Call light in place. ID band in place. Problem: Safety - Adult  Goal: Free from fall injury  Outcome: Progressing  Flowsheets (Taken 3/11/2023 1820)  Free From Fall Injury: Instruct family/caregiver on patient safety  Note: The patient remained free from falls this shift, call light within reach, bed in locked and lowest position. Side rails up x2. Continue to monitor closely.       Problem: Chronic Conditions and Co-morbidities  Goal: Patient's chronic conditions and co-morbidity symptoms are monitored and maintained or improved  Outcome: Progressing  Flowsheets (Taken 3/11/2023 1820)  Care Plan - Patient's Chronic Conditions and Co-Morbidity Symptoms are Monitored and Maintained or Improved:   Monitor and assess patient's chronic conditions and comorbid symptoms for stability, deterioration, or improvement   Collaborate with multidisciplinary team to address chronic and comorbid conditions and prevent exacerbation or deterioration   Update acute care plan with appropriate goals if chronic or comorbid symptoms are exacerbated and prevent overall improvement and discharge

## 2023-03-11 NOTE — CARE COORDINATION
Case Management Assessment  Initial Evaluation    Date/Time of Evaluation: 3/11/2023 4:18 PM  Assessment Completed by: Emiliana Shearer RN    If patient is discharged prior to next notation, then this note serves as note for discharge by case management. Patient Name: Holly Mendez                   YOB: 1980  Diagnosis: Hallucinations [R44.3]  Hyponatremia [E87.1]  Hyperglycemia [R73.9]                   Date / Time: 3/10/2023  6:13 PM    Patient Admission Status: Inpatient   Readmission Risk (Low < 19, Mod (19-27), High > 27): Readmission Risk Score: 16.1    Current PCP: Holly Acevedo  PCP verified by CM? yes    Chart Reviewed: Yes      History Provided by: pateint   Patient Orientation: alert and oriented     Patient Cognition:  drowsy    Hospitalization in the last 30 days (Readmission):  No    If yes, Readmission Assessment in CM Navigator will be completed. Advance Directives:      Code Status: Full Code   Patient's Primary Decision Maker is:        Discharge Planning:    Patient lives with:  Other (Comment) Type of Home:    Primary Care Giver:    Patient Support Systems include:     Current Financial resources:    Current community resources:    Current services prior to admission:              Current DME:              Type of Home Care services:       ADLS  Prior functional level:    Current functional level:      PT AM-PAC:   /24  OT AM-PAC:   /24    Family can provide assistance at DC:  NA  Would you like Case Management to discuss the discharge plan with any other family members/significant others, and if so, who?  NA  Plans to Return to Present Housing: YES   Other Identified Issues/Barriers to RETURNING to current housing: NA  Potential Assistance needed at discharge:  NO            Potential DME: NONE   Patient expects to discharge to:  [facility] for transportation at discharge:  family    Financial    Payor: MEDICARE / Plan: MEDICARE PART A AND B / Product Type: *No Product type* /     Does insurance require precert for SNF: No    Potential assistance Purchasing Medications: no   Meds-to-Beds request: no       [id number] Mohamud Griffith[address] [phone] [address] [facility] [address]  Phone: [phone] Fax: [phone]      Notes:    Factors facilitating achievement of predicted outcomes: Family support    Barriers to discharge: Cognitive deficit, Limited participation, Depression, Anxiety, and Medication managment    Additional Case Management Notes: 3/11/2023 Medicare/Medicaid; pt from home with ex- and daughter; DME glucometer; VNS denies needs//KR    The Plan for Transition of Care is related to the following treatment goals of Hallucinations [R44.3]  Hyponatremia [E87.1]  Hyperglycemia [R73.9]    The Patient and/or Patient Representative Agree with the Discharge Plan?   LEWIS Sage RN  Case Management Department  Ph: [phone]  Fax: [phone]

## 2023-03-12 ENCOUNTER — HOSPITAL ENCOUNTER (INPATIENT)
Age: 43
LOS: 8 days | Discharge: HOME | DRG: 885 | End: 2023-03-20
Attending: PSYCHIATRY & NEUROLOGY | Admitting: PSYCHIATRY & NEUROLOGY
Payer: MEDICARE

## 2023-03-12 VITALS
DIASTOLIC BLOOD PRESSURE: 70 MMHG | HEIGHT: 68 IN | OXYGEN SATURATION: 98 % | TEMPERATURE: 98.1 F | WEIGHT: 249.12 LBS | HEART RATE: 93 BPM | RESPIRATION RATE: 16 BRPM | SYSTOLIC BLOOD PRESSURE: 120 MMHG | BODY MASS INDEX: 37.76 KG/M2

## 2023-03-12 PROBLEM — F31.63 BIPOLAR 1 DISORDER, MIXED, SEVERE (HCC): Status: ACTIVE | Noted: 2023-03-12

## 2023-03-12 LAB
ABSOLUTE EOS #: 0.1 K/UL (ref 0–0.4)
ABSOLUTE LYMPH #: 2.2 K/UL (ref 1–4.8)
ABSOLUTE MONO #: 0.7 K/UL (ref 0.1–1.3)
ALBUMIN SERPL-MCNC: 2.5 G/DL (ref 3.5–5.2)
ALP SERPL-CCNC: 127 U/L (ref 35–104)
ALT SERPL-CCNC: 36 U/L (ref 5–33)
ANION GAP SERPL CALCULATED.3IONS-SCNC: 7 MMOL/L (ref 9–17)
AST SERPL-CCNC: 19 U/L
BASOPHILS # BLD: 0 % (ref 0–2)
BASOPHILS ABSOLUTE: 0 K/UL (ref 0–0.2)
BILIRUB SERPL-MCNC: 0.2 MG/DL (ref 0.3–1.2)
BUN SERPL-MCNC: 7 MG/DL (ref 6–20)
CALCIUM SERPL-MCNC: 8 MG/DL (ref 8.6–10.4)
CHLORIDE SERPL-SCNC: 104 MMOL/L (ref 98–107)
CO2 SERPL-SCNC: 27 MMOL/L (ref 20–31)
CREAT SERPL-MCNC: 0.49 MG/DL (ref 0.5–0.9)
EOSINOPHILS RELATIVE PERCENT: 2 % (ref 0–4)
GFR SERPL CREATININE-BSD FRML MDRD: >60 ML/MIN/1.73M2
GLUCOSE BLD-MCNC: 110 MG/DL (ref 65–105)
GLUCOSE BLD-MCNC: 243 MG/DL (ref 65–105)
GLUCOSE BLD-MCNC: 265 MG/DL (ref 65–105)
GLUCOSE BLD-MCNC: 344 MG/DL (ref 65–105)
GLUCOSE SERPL-MCNC: 279 MG/DL (ref 70–99)
HCT VFR BLD AUTO: 32.5 % (ref 36–46)
HGB BLD-MCNC: 10.9 G/DL (ref 12–16)
LYMPHOCYTES # BLD: 37 % (ref 24–44)
MCH RBC QN AUTO: 30.8 PG (ref 26–34)
MCHC RBC AUTO-ENTMCNC: 33.6 G/DL (ref 31–37)
MCV RBC AUTO: 91.6 FL (ref 80–100)
MONOCYTES # BLD: 12 % (ref 1–7)
PDW BLD-RTO: 13.2 % (ref 11.5–14.9)
PLATELET # BLD AUTO: 160 K/UL (ref 150–450)
PMV BLD AUTO: 10.2 FL (ref 6–12)
POTASSIUM SERPL-SCNC: 3.6 MMOL/L (ref 3.7–5.3)
PROT SERPL-MCNC: 4.8 G/DL (ref 6.4–8.3)
RBC # BLD: 3.54 M/UL (ref 4–5.2)
SEG NEUTROPHILS: 49 % (ref 36–66)
SEGMENTED NEUTROPHILS ABSOLUTE COUNT: 2.9 K/UL (ref 1.3–9.1)
SODIUM SERPL-SCNC: 138 MMOL/L (ref 135–144)
WBC # BLD AUTO: 6 K/UL (ref 3.5–11)

## 2023-03-12 PROCEDURE — 80053 COMPREHEN METABOLIC PANEL: CPT

## 2023-03-12 PROCEDURE — 6370000000 HC RX 637 (ALT 250 FOR IP): Performed by: NURSE PRACTITIONER

## 2023-03-12 PROCEDURE — 6360000002 HC RX W HCPCS: Performed by: NURSE PRACTITIONER

## 2023-03-12 PROCEDURE — 36415 COLL VENOUS BLD VENIPUNCTURE: CPT

## 2023-03-12 PROCEDURE — 82947 ASSAY GLUCOSE BLOOD QUANT: CPT

## 2023-03-12 PROCEDURE — 99232 SBSQ HOSP IP/OBS MODERATE 35: CPT | Performed by: INTERNAL MEDICINE

## 2023-03-12 PROCEDURE — 85025 COMPLETE CBC W/AUTO DIFF WBC: CPT

## 2023-03-12 PROCEDURE — 1240000000 HC EMOTIONAL WELLNESS R&B

## 2023-03-12 PROCEDURE — 2580000003 HC RX 258: Performed by: NURSE PRACTITIONER

## 2023-03-12 RX ORDER — ACETAMINOPHEN 325 MG/1
650 TABLET ORAL EVERY 4 HOURS PRN
Status: DISCONTINUED | OUTPATIENT
Start: 2023-03-12 | End: 2023-03-20 | Stop reason: HOSPADM

## 2023-03-12 RX ORDER — DILTIAZEM HYDROCHLORIDE 180 MG/1
360 CAPSULE, COATED, EXTENDED RELEASE ORAL DAILY
Status: DISCONTINUED | OUTPATIENT
Start: 2023-03-13 | End: 2023-03-20 | Stop reason: HOSPADM

## 2023-03-12 RX ORDER — DILTIAZEM HYDROCHLORIDE 180 MG/1
360 CAPSULE, COATED, EXTENDED RELEASE ORAL DAILY
Status: CANCELLED | OUTPATIENT
Start: 2023-03-13

## 2023-03-12 RX ORDER — ACETAMINOPHEN 325 MG/1
650 TABLET ORAL EVERY 6 HOURS PRN
Status: DISCONTINUED | OUTPATIENT
Start: 2023-03-12 | End: 2023-03-20 | Stop reason: HOSPADM

## 2023-03-12 RX ORDER — INSULIN LISPRO 100 [IU]/ML
0-4 INJECTION, SOLUTION INTRAVENOUS; SUBCUTANEOUS NIGHTLY
Status: CANCELLED | OUTPATIENT
Start: 2023-03-12

## 2023-03-12 RX ORDER — FONDAPARINUX SODIUM 10 MG/.8ML
10 INJECTION SUBCUTANEOUS DAILY
Status: DISCONTINUED | OUTPATIENT
Start: 2023-03-13 | End: 2023-03-13

## 2023-03-12 RX ORDER — INSULIN LISPRO 100 [IU]/ML
0-8 INJECTION, SOLUTION INTRAVENOUS; SUBCUTANEOUS
Status: CANCELLED | OUTPATIENT
Start: 2023-03-12

## 2023-03-12 RX ORDER — INSULIN GLARGINE 100 [IU]/ML
24 INJECTION, SOLUTION SUBCUTANEOUS DAILY
Status: DISCONTINUED | OUTPATIENT
Start: 2023-03-13 | End: 2023-03-13

## 2023-03-12 RX ORDER — ACYCLOVIR 200 MG/1
400 CAPSULE ORAL 3 TIMES DAILY
Status: DISCONTINUED | OUTPATIENT
Start: 2023-03-13 | End: 2023-03-20 | Stop reason: HOSPADM

## 2023-03-12 RX ORDER — GAUZE BANDAGE 2" X 2"
100 BANDAGE TOPICAL DAILY
Status: DISCONTINUED | OUTPATIENT
Start: 2023-03-13 | End: 2023-03-20 | Stop reason: HOSPADM

## 2023-03-12 RX ORDER — INSULIN GLARGINE 100 [IU]/ML
24 INJECTION, SOLUTION SUBCUTANEOUS DAILY
Status: CANCELLED | OUTPATIENT
Start: 2023-03-13

## 2023-03-12 RX ORDER — PRAVASTATIN SODIUM 40 MG
40 TABLET ORAL DAILY
Status: CANCELLED | OUTPATIENT
Start: 2023-03-13

## 2023-03-12 RX ORDER — FOLIC ACID 1 MG/1
1 TABLET ORAL DAILY
Status: CANCELLED | OUTPATIENT
Start: 2023-03-13

## 2023-03-12 RX ORDER — FOLIC ACID 1 MG/1
1 TABLET ORAL DAILY
Status: DISCONTINUED | OUTPATIENT
Start: 2023-03-13 | End: 2023-03-20 | Stop reason: HOSPADM

## 2023-03-12 RX ORDER — FONDAPARINUX SODIUM 10 MG/.8ML
10 INJECTION SUBCUTANEOUS DAILY
Status: CANCELLED | OUTPATIENT
Start: 2023-03-13

## 2023-03-12 RX ORDER — POLYETHYLENE GLYCOL 3350 17 G/17G
17 POWDER, FOR SOLUTION ORAL DAILY PRN
Status: DISCONTINUED | OUTPATIENT
Start: 2023-03-12 | End: 2023-03-20 | Stop reason: HOSPADM

## 2023-03-12 RX ORDER — IBUPROFEN 400 MG/1
400 TABLET ORAL EVERY 6 HOURS PRN
Status: DISCONTINUED | OUTPATIENT
Start: 2023-03-12 | End: 2023-03-20 | Stop reason: HOSPADM

## 2023-03-12 RX ORDER — M-VIT,TX,IRON,MINS/CALC/FOLIC 27MG-0.4MG
1 TABLET ORAL DAILY
Status: CANCELLED | OUTPATIENT
Start: 2023-03-13

## 2023-03-12 RX ORDER — INSULIN LISPRO 100 [IU]/ML
0-4 INJECTION, SOLUTION INTRAVENOUS; SUBCUTANEOUS NIGHTLY
Status: DISCONTINUED | OUTPATIENT
Start: 2023-03-13 | End: 2023-03-20 | Stop reason: HOSPADM

## 2023-03-12 RX ORDER — LOSARTAN POTASSIUM 50 MG/1
50 TABLET ORAL DAILY
Status: DISCONTINUED | OUTPATIENT
Start: 2023-03-13 | End: 2023-03-20 | Stop reason: HOSPADM

## 2023-03-12 RX ORDER — ACYCLOVIR 200 MG/1
400 CAPSULE ORAL 3 TIMES DAILY
Status: CANCELLED | OUTPATIENT
Start: 2023-03-12

## 2023-03-12 RX ORDER — HALOPERIDOL 5 MG/ML
5 INJECTION INTRAMUSCULAR EVERY 6 HOURS PRN
Status: DISCONTINUED | OUTPATIENT
Start: 2023-03-12 | End: 2023-03-18

## 2023-03-12 RX ORDER — HYDROXYZINE 50 MG/1
50 TABLET, FILM COATED ORAL 3 TIMES DAILY PRN
Status: DISCONTINUED | OUTPATIENT
Start: 2023-03-12 | End: 2023-03-20 | Stop reason: HOSPADM

## 2023-03-12 RX ORDER — M-VIT,TX,IRON,MINS/CALC/FOLIC 27MG-0.4MG
1 TABLET ORAL DAILY
Status: DISCONTINUED | OUTPATIENT
Start: 2023-03-13 | End: 2023-03-20 | Stop reason: HOSPADM

## 2023-03-12 RX ORDER — LOSARTAN POTASSIUM 50 MG/1
50 TABLET ORAL DAILY
Status: CANCELLED | OUTPATIENT
Start: 2023-03-13

## 2023-03-12 RX ORDER — METOPROLOL SUCCINATE 25 MG/1
25 TABLET, EXTENDED RELEASE ORAL DAILY
Status: DISCONTINUED | OUTPATIENT
Start: 2023-03-13 | End: 2023-03-20 | Stop reason: HOSPADM

## 2023-03-12 RX ORDER — TRAZODONE HYDROCHLORIDE 50 MG/1
50 TABLET ORAL NIGHTLY PRN
Status: DISCONTINUED | OUTPATIENT
Start: 2023-03-12 | End: 2023-03-18

## 2023-03-12 RX ORDER — DIPHENHYDRAMINE HYDROCHLORIDE 50 MG/ML
50 INJECTION INTRAMUSCULAR; INTRAVENOUS EVERY 6 HOURS PRN
Status: DISCONTINUED | OUTPATIENT
Start: 2023-03-12 | End: 2023-03-18

## 2023-03-12 RX ORDER — INSULIN LISPRO 100 [IU]/ML
0-8 INJECTION, SOLUTION INTRAVENOUS; SUBCUTANEOUS
Status: DISCONTINUED | OUTPATIENT
Start: 2023-03-13 | End: 2023-03-20 | Stop reason: HOSPADM

## 2023-03-12 RX ORDER — PRAVASTATIN SODIUM 40 MG
40 TABLET ORAL DAILY
Status: DISCONTINUED | OUTPATIENT
Start: 2023-03-13 | End: 2023-03-20 | Stop reason: HOSPADM

## 2023-03-12 RX ORDER — METOPROLOL SUCCINATE 25 MG/1
25 TABLET, EXTENDED RELEASE ORAL DAILY
Status: CANCELLED | OUTPATIENT
Start: 2023-03-13

## 2023-03-12 RX ORDER — HALOPERIDOL 5 MG/1
5 TABLET ORAL EVERY 6 HOURS PRN
Status: DISCONTINUED | OUTPATIENT
Start: 2023-03-12 | End: 2023-03-18

## 2023-03-12 RX ORDER — MAGNESIUM HYDROXIDE/ALUMINUM HYDROXICE/SIMETHICONE 120; 1200; 1200 MG/30ML; MG/30ML; MG/30ML
30 SUSPENSION ORAL EVERY 6 HOURS PRN
Status: DISCONTINUED | OUTPATIENT
Start: 2023-03-12 | End: 2023-03-20 | Stop reason: HOSPADM

## 2023-03-12 RX ORDER — LORAZEPAM 1 MG/1
2 TABLET ORAL EVERY 6 HOURS PRN
Status: DISCONTINUED | OUTPATIENT
Start: 2023-03-12 | End: 2023-03-18

## 2023-03-12 RX ORDER — FLUOXETINE HYDROCHLORIDE 20 MG/1
40 CAPSULE ORAL DAILY
Status: CANCELLED | OUTPATIENT
Start: 2023-03-13

## 2023-03-12 RX ORDER — FLUOXETINE HYDROCHLORIDE 20 MG/1
40 CAPSULE ORAL DAILY
Status: DISCONTINUED | OUTPATIENT
Start: 2023-03-13 | End: 2023-03-20 | Stop reason: HOSPADM

## 2023-03-12 RX ORDER — ACETAMINOPHEN 325 MG/1
650 TABLET ORAL EVERY 4 HOURS PRN
Status: CANCELLED | OUTPATIENT
Start: 2023-03-12

## 2023-03-12 RX ORDER — LORAZEPAM 2 MG/ML
2 INJECTION INTRAMUSCULAR EVERY 6 HOURS PRN
Status: DISCONTINUED | OUTPATIENT
Start: 2023-03-12 | End: 2023-03-18

## 2023-03-12 RX ORDER — GAUZE BANDAGE 2" X 2"
100 BANDAGE TOPICAL DAILY
Status: CANCELLED | OUTPATIENT
Start: 2023-03-13

## 2023-03-12 RX ADMIN — INSULIN GLARGINE 24 UNITS: 100 INJECTION, SOLUTION SUBCUTANEOUS at 09:07

## 2023-03-12 RX ADMIN — INSULIN LISPRO 4 UNITS: 100 INJECTION, SOLUTION INTRAVENOUS; SUBCUTANEOUS at 12:02

## 2023-03-12 RX ADMIN — FOLIC ACID 1 MG: 1 TABLET ORAL at 09:08

## 2023-03-12 RX ADMIN — INSULIN LISPRO 6 UNITS: 100 INJECTION, SOLUTION INTRAVENOUS; SUBCUTANEOUS at 17:15

## 2023-03-12 RX ADMIN — MULTIPLE VITAMINS W/ MINERALS TAB 1 TABLET: TAB at 09:08

## 2023-03-12 RX ADMIN — ACYCLOVIR 400 MG: 200 CAPSULE ORAL at 17:15

## 2023-03-12 RX ADMIN — FONDAPARINUX SODIUM 10 MG: 10 INJECTION, SOLUTION SUBCUTANEOUS at 09:09

## 2023-03-12 RX ADMIN — LORAZEPAM 2 MG: 1 TABLET ORAL at 12:34

## 2023-03-12 RX ADMIN — THIAMINE HCL TAB 100 MG 100 MG: 100 TAB at 09:07

## 2023-03-12 RX ADMIN — DILTIAZEM HYDROCHLORIDE 360 MG: 180 CAPSULE, COATED, EXTENDED RELEASE ORAL at 09:08

## 2023-03-12 RX ADMIN — PRAVASTATIN SODIUM 40 MG: 40 TABLET ORAL at 09:08

## 2023-03-12 RX ADMIN — SODIUM CHLORIDE, PRESERVATIVE FREE 10 ML: 5 INJECTION INTRAVENOUS at 09:16

## 2023-03-12 RX ADMIN — METOPROLOL SUCCINATE 25 MG: 25 TABLET, EXTENDED RELEASE ORAL at 09:08

## 2023-03-12 RX ADMIN — ACYCLOVIR 400 MG: 200 CAPSULE ORAL at 20:32

## 2023-03-12 RX ADMIN — ACYCLOVIR 400 MG: 200 CAPSULE ORAL at 09:09

## 2023-03-12 RX ADMIN — LOSARTAN POTASSIUM 50 MG: 50 TABLET, FILM COATED ORAL at 09:08

## 2023-03-12 RX ADMIN — FLUOXETINE 40 MG: 20 CAPSULE ORAL at 09:08

## 2023-03-12 RX ADMIN — LORAZEPAM 2 MG: 2 INJECTION INTRAMUSCULAR; INTRAVENOUS at 00:23

## 2023-03-12 RX ADMIN — INSULIN LISPRO 4 UNITS: 100 INJECTION, SOLUTION INTRAVENOUS; SUBCUTANEOUS at 09:07

## 2023-03-12 ASSESSMENT — SLEEP AND FATIGUE QUESTIONNAIRES
AVERAGE NUMBER OF SLEEP HOURS: 4
DO YOU HAVE DIFFICULTY SLEEPING: YES
DO YOU HAVE DIFFICULTY SLEEPING: YES
DO YOU USE A SLEEP AID: NO
SLEEP PATTERN: DIFFICULTY FALLING ASLEEP;DISTURBED/INTERRUPTED SLEEP;RESTLESSNESS
SLEEP PATTERN: DIFFICULTY FALLING ASLEEP;RESTLESSNESS
DO YOU USE A SLEEP AID: NO

## 2023-03-12 ASSESSMENT — LIFESTYLE VARIABLES
HOW OFTEN DO YOU HAVE A DRINK CONTAINING ALCOHOL: 4 OR MORE TIMES A WEEK
HOW MANY STANDARD DRINKS CONTAINING ALCOHOL DO YOU HAVE ON A TYPICAL DAY: 10 OR MORE

## 2023-03-12 ASSESSMENT — PATIENT HEALTH QUESTIONNAIRE - PHQ9: SUM OF ALL RESPONSES TO PHQ QUESTIONS 1-9: 18

## 2023-03-12 ASSESSMENT — PAIN SCALES - GENERAL: PAINLEVEL_OUTOF10: 0

## 2023-03-12 NOTE — PLAN OF CARE
Problem: Discharge Planning  Goal: Discharge to home or other facility with appropriate resources  Outcome: Progressing  Note: Patient to discharge to [facility] when medically cleared notation is in chart, awaiting notation to post.      Problem: Pain  Goal: Verbalizes/displays adequate comfort level or baseline comfort level  Outcome: Progressing  Note: Patient denies pain this shift. Problem: Confusion  Goal: Confusion, delirium, dementia, or psychosis is improved or at baseline  Description: INTERVENTIONS:  1. Assess for possible contributors to thought disturbance, including medications, impaired vision or hearing, underlying metabolic abnormalities, dehydration, psychiatric diagnoses, and notify attending LIP  2. [place] high risk fall precautions, as indicated  3. Provide frequent short contacts to provide reality reorientation, refocusing and direction  4. Decrease environmental stimuli, including noise as appropriate  5. Monitor and intervene to maintain adequate nutrition, hydration, elimination, sleep and activity  6. If unable to ensure safety without constant attention obtain sitter and review sitter guidelines with assigned personnel  7. Initiate Psychosocial CNS and Spiritual Care consult, as indicated  Outcome: Progressing  Flowsheets (Taken 3/11/2023 1820)  Effect of thought disturbance (confusion, delirium, dementia, or psychosis) are managed with adequate functional status:   [place] high risk fall precautions, as indicated   Monitor and intervene to maintain adequate nutrition, hydration, elimination, sleep and activity  Note: Patient is alert and oriented with auditory and visual hallucinations. Will continue to monitor to changes of mentation.       Problem: ABCDS Injury Assessment  Goal: Absence of physical injury  Outcome: Progressing  Flowsheets (Taken 3/12/2023 1756)  Absence of Physical Injury: Implement safety measures based on patient assessment  Note: Fall assessment performed and appropriate measures implemented. Room freed from clutter. Bed in lowest position with wheels locked. Call light in place. ID band in place. Problem: Safety - Adult  Goal: Free from fall injury  Outcome: Progressing  Flowsheets (Taken 3/12/2023 1756)  Free From Fall Injury: Instruct family/caregiver on patient safety  Note: The patient remained free from falls this shift, call light within reach, bed in locked and lowest position. Side rails up x2. Continue to monitor closely.       Problem: Chronic Conditions and Co-morbidities  Goal: Patient's chronic conditions and co-morbidity symptoms are monitored and maintained or improved  Outcome: Progressing  Flowsheets (Taken 3/11/2023 1820)  Care Plan - Patient's Chronic Conditions and Co-Morbidity Symptoms are Monitored and Maintained or Improved:   Monitor and assess patient's chronic conditions and comorbid symptoms for stability, deterioration, or improvement   Collaborate with multidisciplinary team to address chronic and comorbid conditions and prevent exacerbation or deterioration   Update acute care plan with appropriate goals if chronic or comorbid symptoms are exacerbated and prevent overall improvement and discharge

## 2023-03-12 NOTE — PROGRESS NOTES
Discussed BRANDY Rn visit with patient with request for shower. Patient stated she does not want to speak about assault or have any exams. She was very adamant about this, stating she will not talk about it. Writer explained that if she changes her mind, we would follow up with BRANDY Rn. Will notify BRANDY Rn to patient's decision.

## 2023-03-12 NOTE — PROGRESS NOTES
Spoke with [address] to notify of pt medical clearance. They will review chart and confirm acceptance with physician. Awaiting response.

## 2023-03-12 NOTE — PROGRESS NOTES
Report called and given to I nurse. Sun Rocha, pt's mother updated with pt's new room, times to call [facility] unit, and I visitation times. Phone number of Sun Rocha given to [facility].

## 2023-03-12 NOTE — PLAN OF CARE
Problem: Discharge Planning  Goal: Discharge to home or other facility with appropriate resources  3/12/2023 0312 by Ronald Jarrett RN  Outcome: Progressing     Problem: Pain  Goal: Verbalizes/displays adequate comfort level or baseline comfort level  3/12/2023 0312 by Ronald Jarrett RN  Outcome: Progressing     Problem: Confusion  Goal: Confusion, delirium, dementia, or psychosis is improved or at baseline  Description: INTERVENTIONS:  1. Assess for possible contributors to thought disturbance, including medications, impaired vision or hearing, underlying metabolic abnormalities, dehydration, psychiatric diagnoses, and notify attending LIP  2. [place] high risk fall precautions, as indicated  3. Provide frequent short contacts to provide reality reorientation, refocusing and direction  4. Decrease environmental stimuli, including noise as appropriate  5. Monitor and intervene to maintain adequate nutrition, hydration, elimination, sleep and activity  6. If unable to ensure safety without constant attention obtain sitter and review sitter guidelines with assigned personnel  7. Initiate Psychosocial CNS and Spiritual Care consult, as indicated  3/12/2023 2226 by Ronald Jarrett RN  Outcome: Progressing     Problem: Safety - Adult  Goal: Free from fall injury  3/12/2023 0312 by Ronald Jarrett RN  Outcome: Progressing  Note: The patient remained free from falls this shift, call light within reach, bed in locked and lowest position. Side rails up x2. Continue to monitor closely.        Problem: Chronic Conditions and Co-morbidities  Goal: Patient's chronic conditions and co-morbidity symptoms are monitored and maintained or improved  3/12/2023 0312 by Ronald Jarrett RN  Outcome: Progressing

## 2023-03-12 NOTE — H&P
[facility]  Calixto Self MD; Sarah Toscano MD; Darian Holman MD; MD Marlene Rios MD; MD MASON [facility] Internal Medicine   [facility]    HISTORY AND PHYSICAL EXAMINATION            Date:   3/12/2023  Patient name:  Tita Gonzalez  Date of admission:  3/10/2023  6:13 PM  MRN:   [id number]  Account:  [de-identified]  YOB: 1980  PCP:    Rica Pierre  Room:   2114/2114-01  Code Status:    Full Code    Chief Complaint:     Chief Complaint   Patient presents with    Hallucinations   Hyperglycemia    History Obtained From:     patient    History of Present Illness:     Tita Gonzalez is a 36 y.o. Non- / non  female who presents with Hallucinations   and is admitted to the hospital for the management of Hyperglycemia. According to patient, she began having hallucinations approximately 1 week ago. Reports history of bipolar but states she does not usually have hallucinations. Reports history of alcohol abuse with periods of sobriety, but reports that she began daily drinking approximately 2 weeks ago. States that she recently drank 30 shots of fireball over a 6-hour period, but has down over the past couple days. Reports past history of alcohol withdrawal seizures and DTs but does not believe hallucinations are related to her alcohol withdrawal because the hallucinations started while she was still drinking daily. Symptoms are associated with headache and upper back pain, which patient reports as being chronic and unchanged. Denies fever, chills, chest pain, cough, abdominal pain, nausea, vomiting, diarrhea, and urinary symptoms. There are no aggravating or alleviating factors. Symptoms are reported as constant and moderate. While examining patient and completing orders, patient occasionally begins talking to someone who is not present.   At one point, patient began whispering her responses because she said her twin brother was standing right there and she did not want him to hear. ED physician reported that patient stated that she recently learned her best friend is moving out of state.         Patient status inpatient in the Progressive Unit/Step down      Past Medical History:     Past Medical History:   Diagnosis Date    Alcohol abuse     sober [id number]    Anxiety     Arthritis     Painting esophagus     Benzodiazepine overdose 9/26/2015    Bipolar disorder, unspecified ([address]     Bipolar I disorder, most recent episode depressed, severe without psychotic features ([address]     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 benzodiazepine     Pernicious anemia     Primary hypercoagulable state ([address]     antiphospholipid antibodies on Arixtra shots daily    Pulmonary embolism ([address] 2010    Suicidal behavior 12/14/2015    Suicidal ideation     Suicide attempt ([address] 2014    hx OD on painkillers and rubbing alcohol    SVT (supraventricular tachycardia) ([address] 04/07/2017    Toxic effect of ethanol, intentional self-harm ([address] 12/16/2015    Type 2 diabetes mellitus, with long-term current use of insulin ([address]         Past Surgical History:     Past Surgical History:   Procedure Laterality Date    ABDOMINAL HERNIA REPAIR  2014    wound vac    ABDOMINAL HERNIA REPAIR  11/3/14    BARIATRIC SURGERY  2013    [facility]    CHOLECYSTECTOMY      DILATION AND CURETTAGE OF UTERUS  2005    ENDOSCOPY, COLON, DIAGNOSTIC      EGD    FINGER AMPUTATION Left 02/09/2015    index and ring finger. from frostbite    HERNIA REPAIR      total of 8    IVC FILTER INSERTION      LIVER RESECTION      for hepatic adenoma    LYMPH NODE BIOPSY  1990    JUSTINE AND BSO (CERVIX REMOVED)  2011    TONSILLECTOMY          Medications Prior to Admission:     Prior to Admission medications    Medication Sig Start Date End Date Taking? Authorizing Provider   insulin glargine, 1 unit dial, (TOUJEO SOLOSTAR) 300 UNIT/ML concentrated injection pen Inject 30 Units into the skin every morning   Yes Historical Provider, MD   valACYclovir (VALTREX) 500 MG tablet Take 500 mg by mouth daily   Yes Historical Provider, MD   LORazepam (ATIVAN) 1 MG tablet Take 1 mg by mouth daily as needed for Anxiety. Indications: last filled 2/16/23 for 30 days   Yes Historical Provider, MD   fondaparinux (ARIXTRA) 10 MG/0.8ML SOLN injection Inject 10 mg into the skin daily 11/10/22   Historical Provider, MD   FLUoxetine (PROZAC) 20 MG capsule Take 2 capsules by mouth daily 6/12/22   Luis M Benoit MD   insulin lispro (HUMALOG) 100 UNIT/ML SOLN injection vial **Medium Dose Corrective Algorithm**  Glucose: Dose:  If <139             No Insulin  140-199 2 Units  200-249 4 Units  250-299 6 Units  300-349 8 Units  350-400 10 Units  Above 400       12 Units 6/12/22   Luis M Benoit MD   metoprolol succinate (TOPROL XL) 25 MG extended release tablet Take 1 tablet by mouth daily 6/13/22   Luis M Benoit MD   miconazole (MICOTIN) 2 % powder Apply topically 2 times daily.  6/12/22   Luis M Benoit MD   folic acid (FOLVITE) 1 MG tablet Take 1 tablet by mouth daily 6/13/22   Luis M Benoit MD   thiamine mononitrate (THIAMINE) 100 MG tablet Take 1 tablet by mouth daily 6/13/22   Katherine Gibbs MD   dilTIAZem (CARDIZEM CD) 360 MG extended release capsule Take 360 mg by mouth daily    Historical Provider, MD   losartan (COZAAR) 50 mg tablet Take 50 mg by mouth daily    Historical Provider, MD   cyanocobalamin 1000 MCG/ML injection Inject 1 mL into the muscle every 7 days 2/13/21   Viry Rosales MD   Syringe/Needle, Disp, (SYRINGE 3CC/25GX1\") 25G X 1\" 3 ML MISC To be used with B12 injections monthly 2/13/21   Viry Rosales MD   FARXIGA 10 MG tablet TAKE 1 TABLET BY MOUTH EVERY MORNING 11/2/20   Viry Rosales MD   pravastatin (PRAVACHOL) 40 MG tablet TAKE 1 TABLET(40 MG) BY MOUTH DAILY 6/26/20   Viry Rosales MD   acetaminophen (TYLENOL) 500 MG tablet Take 2 tablets by mouth every 8 hours as needed for Pain 2/19/20   Loraine Motta, DO   glucose monitoring kit (FREESTYLE) monitoring kit Testing twice a day. Please dispense according to patients insurance. 12/20/19   Viry Rosales MD   Insulin Pen Needle 31G X 5 MM MISC 1 each by Does not apply route daily 12/20/19   Viry Rosales MD   Lancets 30G MISC Testing twice a day. Please dispense according to patients insurance. 12/20/19   Viry Rosales MD   blood glucose monitor strips Testing twice a day. Please dispense according to patients insurance.  12/20/19   Viry Rosales MD   Multiple Vitamins-Minerals (THERAPEUTIC MULTIVITAMIN-MINERALS) tablet Take 1 tablet by mouth daily    Historical Provider, MD   vitamin D (CHOLECALCIFEROL) 1000 UNIT TABS tablet Take 1,000 Units by mouth daily     Historical Provider, MD        Allergies:     Asa [aspirin], Betadine [povidone iodine], Celexa [citalopram hydrobromide], Citalopram, Lasix [furosemide], Macrobid [nitrofurantoin], Norco [hydrocodone-acetaminophen], Betadine [povidone iodine], Codeine, Lithium, Paroxetine, Paxil [paroxetine hcl], Tape [adhesive tape], and Tegretol [carbamazepine]    Social History:     Tobacco: reports that she has never smoked. She has never used smokeless tobacco.  Alcohol:      reports no history of alcohol use. Drug Use:  reports no history of drug use. Family History:     Family History   Problem Relation Age of Onset    Depression Mother     High Blood Pressure Mother     High Cholesterol Mother     Diabetes Father     Heart Disease Father     Kidney Disease Father     High Blood Pressure Father     High Cholesterol Father     Cancer Maternal Uncle         of duodenum had whipple    Breast Cancer Maternal Aunt     Breast Cancer Maternal Cousin        Review of Systems:     Positive and Negative as described in HPI.     Physical Exam:   /74   Pulse 97   Temp 98.6 °F (37 °C) (Oral)   Resp 17   Ht 5' 8\" (1.727 m)   Wt 249 lb 1.9 oz (113 kg)   SpO2 97%   BMI 37.88 kg/m²   Temp (24hrs), Av.1 °F (36.7 °C), Min:97.7 °F (36.5 °C), Max:98.6 °F (37 °C)    Recent Labs     23  2052 23  0604 23  1117 23  1616   POCGLU 274* 243* 265* 344*       No intake or output data in the 24 hours ending 23 1738    General Appearance: alert, well appearing, and in no acute distress  Mental status: oriented to person, place, and time  Lungs: Bilateral equal air entry, clear to ausculation, no wheezing, rales or rhonchi, normal effort  Cardiovascular: normal rate, regular rhythm, no murmur, gallop, rub  Abdomen: Soft, nontender, nondistended, normal bowel sounds, no hepatomegaly or splenomegaly  Neurologic: There are no new focal motor or sensory deficits, normal muscle tone and bulk, no abnormal sensation, normal speech, cranial nerves II through XII grossly intact  Skin: No gross lesions, rashes, bruising or bleeding on exposed skin area  Extremities: peripheral pulses palpable, no pedal edema or calf pain with palpation      Investigations:      Laboratory Testing:  Recent Results (from the past 24 hour(s))   POC Glucose Fingerstick    Collection Time: 23  8:52 PM Result Value Ref Range    POC Glucose 274 (H) 65 - 105 mg/dL   CBC with Auto Differential    Collection Time: 03/12/23  5:41 AM   Result Value Ref Range    WBC 6.0 3.5 - 11.0 k/uL    RBC 3.54 (L) 4.0 - 5.2 m/uL    Hemoglobin 10.9 (L) 12.0 - 16.0 g/dL    Hematocrit 32.5 (L) 36 - 46 %    MCV 91.6 80 - 100 fL    MCH 30.8 26 - 34 pg    MCHC 33.6 31 - 37 g/dL    RDW 13.2 11.5 - 14.9 %    Platelets 315 393 - 979 k/uL    MPV 10.2 6.0 - 12.0 fL    Seg Neutrophils 49 36 - 66 %    Lymphocytes 37 24 - 44 %    Monocytes 12 (H) 1 - 7 %    Eosinophils % 2 0 - 4 %    Basophils 0 0 - 2 %    Segs Absolute 2.90 1.3 - 9.1 k/uL    Absolute Lymph # 2.20 1.0 - 4.8 k/uL    Absolute Mono # 0.70 0.1 - 1.3 k/uL    Absolute Eos # 0.10 0.0 - 0.4 k/uL    Basophils Absolute 0.00 0.0 - 0.2 k/uL   Comprehensive Metabolic Panel    Collection Time: 03/12/23  5:41 AM   Result Value Ref Range    Glucose 279 (H) 70 - 99 mg/dL    BUN 7 6 - 20 mg/dL    Creatinine 0.49 (L) 0.50 - 0.90 mg/dL    Est, Glom Filt Rate >60 >60 mL/min/1.73m2    Calcium 8.0 (L) 8.6 - 10.4 mg/dL    Sodium 138 135 - 144 mmol/L    Potassium 3.6 (L) 3.7 - 5.3 mmol/L    Chloride 104 98 - 107 mmol/L    CO2 27 20 - 31 mmol/L    Anion Gap 7 (L) 9 - 17 mmol/L    Alkaline Phosphatase 127 (H) 35 - 104 U/L    ALT 36 (H) 5 - 33 U/L    AST 19 <32 U/L    Total Bilirubin 0.2 (L) 0.3 - 1.2 mg/dL    Total Protein 4.8 (L) 6.4 - 8.3 g/dL    Albumin 2.5 (L) 3.5 - 5.2 g/dL   POC Glucose Fingerstick    Collection Time: 03/12/23  6:04 AM   Result Value Ref Range    POC Glucose 243 (H) 65 - 105 mg/dL   POC Glucose Fingerstick    Collection Time: 03/12/23 11:17 AM   Result Value Ref Range    POC Glucose 265 (H) 65 - 105 mg/dL   POC Glucose Fingerstick    Collection Time: 03/12/23  4:16 PM   Result Value Ref Range    POC Glucose 344 (H) 65 - 105 mg/dL       Imaging/Diagnostics:  No results found.     Assessment :      Hospital Problems             Last Modified POA    * (Principal) Hyperglycemia 3/10/2023 Yes    Antiphospholipid syndrome ([facility] 3/11/2023 Yes     Plan:     Patient status inpatient in the Progressive Unit/Step down    1. Diabetes Mellitus with hyperglycemia  -Glucose 367 on arrival; not DKA  -Reports that she took her daily dose of Toujeo this am  -- 6 units Humalog administered in ED  -Continue home dose insulin  -hold oral hypoglycemics/metformin for now  -POCT ac and hs with sliding scale coverage     Hallucinations  -Patient reports history of bipolar disorder  -1 week history of auditory and visual hallucinations  -UDS negative  -TSH 1.52  -Denies SI and HI  -Consult psych for possible inpatient treatment once medically clear     Hyponatremia  -Sodium level -128  --Patient reports history of hyponatremia after drinking too much  -1 L normal saline fluid bolus administered in ED  -Repeat BMP at midnight, then determine if additional IV fluids needed     Alcohol abuse  -Patient reports history of daily alcohol use for the past 2 weeks  -- Previously had history of sobriety  -Reports past history of alcohol withdrawal seizures and DTs  --Ethanol < 10 in ED  -thiamine and folic acid daily  -Ativan per CIWA scale  -Seizure precautions     Chronic Anticoagulation  -Patient anticoagulated d/t history of antiphospholipid syndrome  -Continue home dose Arixtra  -monitor for signs of bleeding      Disposition 2 days     March 12,  Diabetes, hyperglycemia, monitor, better now, medication readjusted,  Hyponatremia little better will monitor,  Patient medically stable to go back to [facility]      Consultations:   IP CONSULT TO PRIMARY CARE PROVIDER  IP CONSULT TO SOCIAL WORK  IP CONSULT TO PSYCHIATRY     Patient is admitted as inpatient status because of co-morbidities listed above, severity of signs and symptoms as outlined, requirement for current medical therapies and most importantly because of direct risk to patient if care not provided in a hospital setting. Expected length of stay > 48 hours.     Gricelda Steward MD  3/12/2023  5:38 PM    Copy sent to Dr. Rebecca Flor    Please note that this chart was generated using voice recognition Dragon dictation software. Although every effort was made to ensure the accuracy of this automated transcription, some errors in transcription may have occurred.

## 2023-03-13 LAB
GLUCOSE BLD-MCNC: 117 MG/DL (ref 65–105)
GLUCOSE BLD-MCNC: 201 MG/DL (ref 65–105)
GLUCOSE BLD-MCNC: 226 MG/DL (ref 65–105)
GLUCOSE BLD-MCNC: 267 MG/DL (ref 65–105)

## 2023-03-13 PROCEDURE — 82947 ASSAY GLUCOSE BLOOD QUANT: CPT

## 2023-03-13 PROCEDURE — 6370000000 HC RX 637 (ALT 250 FOR IP): Performed by: INTERNAL MEDICINE

## 2023-03-13 PROCEDURE — 6370000000 HC RX 637 (ALT 250 FOR IP): Performed by: PSYCHIATRY & NEUROLOGY

## 2023-03-13 PROCEDURE — 1240000000 HC EMOTIONAL WELLNESS R&B

## 2023-03-13 PROCEDURE — 6370000000 HC RX 637 (ALT 250 FOR IP)

## 2023-03-13 PROCEDURE — 6360000002 HC RX W HCPCS: Performed by: INTERNAL MEDICINE

## 2023-03-13 PROCEDURE — 2500000003 HC RX 250 WO HCPCS: Performed by: INTERNAL MEDICINE

## 2023-03-13 RX ORDER — METOPROLOL SUCCINATE 25 MG/1
25 TABLET, EXTENDED RELEASE ORAL DAILY
Status: DISCONTINUED | OUTPATIENT
Start: 2023-03-13 | End: 2023-03-13

## 2023-03-13 RX ORDER — FONDAPARINUX SODIUM 10 MG/.8ML
10 INJECTION SUBCUTANEOUS DAILY
Status: DISCONTINUED | OUTPATIENT
Start: 2023-03-14 | End: 2023-03-20 | Stop reason: HOSPADM

## 2023-03-13 RX ORDER — OLANZAPINE 5 MG/1
5 TABLET ORAL NIGHTLY
Status: DISCONTINUED | OUTPATIENT
Start: 2023-03-13 | End: 2023-03-14

## 2023-03-13 RX ORDER — LORAZEPAM 1 MG/1
1 TABLET ORAL DAILY PRN
Status: DISCONTINUED | OUTPATIENT
Start: 2023-03-13 | End: 2023-03-18

## 2023-03-13 RX ORDER — LOSARTAN POTASSIUM 50 MG/1
50 TABLET ORAL DAILY
Status: DISCONTINUED | OUTPATIENT
Start: 2023-03-13 | End: 2023-03-13

## 2023-03-13 RX ORDER — PRAVASTATIN SODIUM 40 MG
40 TABLET ORAL NIGHTLY
Status: DISCONTINUED | OUTPATIENT
Start: 2023-03-13 | End: 2023-03-13

## 2023-03-13 RX ORDER — INSULIN GLARGINE 100 [IU]/ML
24 INJECTION, SOLUTION SUBCUTANEOUS DAILY
Status: DISCONTINUED | OUTPATIENT
Start: 2023-03-14 | End: 2023-03-20 | Stop reason: HOSPADM

## 2023-03-13 RX ORDER — FOLIC ACID 1 MG/1
1 TABLET ORAL DAILY
Status: DISCONTINUED | OUTPATIENT
Start: 2023-03-13 | End: 2023-03-13

## 2023-03-13 RX ADMIN — INSULIN LISPRO 2 UNITS: 100 INJECTION, SOLUTION INTRAVENOUS; SUBCUTANEOUS at 17:27

## 2023-03-13 RX ADMIN — MULTIPLE VITAMINS W/ MINERALS TAB 1 TABLET: TAB at 09:05

## 2023-03-13 RX ADMIN — FONDAPARINUX SODIUM 10 MG: 10 INJECTION, SOLUTION SUBCUTANEOUS at 09:07

## 2023-03-13 RX ADMIN — THIAMINE HCL TAB 100 MG 100 MG: 100 TAB at 09:04

## 2023-03-13 RX ADMIN — HYDROXYZINE HYDROCHLORIDE 50 MG: 50 TABLET, FILM COATED ORAL at 21:17

## 2023-03-13 RX ADMIN — DILTIAZEM HYDROCHLORIDE 360 MG: 180 CAPSULE, COATED, EXTENDED RELEASE ORAL at 09:05

## 2023-03-13 RX ADMIN — METOPROLOL SUCCINATE 25 MG: 25 TABLET, EXTENDED RELEASE ORAL at 09:04

## 2023-03-13 RX ADMIN — OLANZAPINE 5 MG: 5 TABLET, FILM COATED ORAL at 21:17

## 2023-03-13 RX ADMIN — LOSARTAN POTASSIUM 50 MG: 50 TABLET, FILM COATED ORAL at 09:04

## 2023-03-13 RX ADMIN — INSULIN GLARGINE 24 UNITS: 100 INJECTION, SOLUTION SUBCUTANEOUS at 09:07

## 2023-03-13 RX ADMIN — PRAVASTATIN SODIUM 40 MG: 40 TABLET ORAL at 09:01

## 2023-03-13 RX ADMIN — TRAZODONE HYDROCHLORIDE 50 MG: 50 TABLET ORAL at 21:17

## 2023-03-13 RX ADMIN — FOLIC ACID 1 MG: 1 TABLET ORAL at 09:05

## 2023-03-13 RX ADMIN — ACYCLOVIR 400 MG: 200 CAPSULE ORAL at 21:17

## 2023-03-13 RX ADMIN — ACYCLOVIR 400 MG: 200 CAPSULE ORAL at 14:14

## 2023-03-13 RX ADMIN — ACYCLOVIR 400 MG: 200 CAPSULE ORAL at 09:01

## 2023-03-13 RX ADMIN — FLUOXETINE 40 MG: 20 CAPSULE ORAL at 09:03

## 2023-03-13 RX ADMIN — INSULIN LISPRO 4 UNITS: 100 INJECTION, SOLUTION INTRAVENOUS; SUBCUTANEOUS at 11:43

## 2023-03-13 RX ADMIN — ANTI-FUNGAL POWDER MICONAZOLE NITRATE TALC FREE: 1.42 POWDER TOPICAL at 09:00

## 2023-03-13 NOTE — H&P
murmur, gallop, rub  Abdomen: Soft, nontender, nondistended, normal bowel sounds, no hepatomegaly or splenomegaly  Neurologic: There are no new focal motor or sensory deficits, normal muscle tone and bulk, no abnormal sensation, normal speech, cranial nerves II through XII grossly intact  Skin: No gross lesions, rashes, bruising or bleeding on exposed skin area  Extremities: peripheral pulses palpable, no pedal edema or calf pain with palpation      Investigations:      Laboratory Testing:  Recent Results (from the past 24 hour(s))   POC Glucose Fingerstick    Collection Time: 03/12/23  4:16 PM   Result Value Ref Range    POC Glucose 344 (H) 65 - 105 mg/dL   POC Glucose Fingerstick    Collection Time: 03/12/23  7:52 PM   Result Value Ref Range    POC Glucose 110 (H) 65 - 105 mg/dL   POC Glucose Fingerstick    Collection Time: 03/13/23  8:18 AM   Result Value Ref Range    POC Glucose 117 (H) 65 - 105 mg/dL   POC Glucose Fingerstick    Collection Time: 03/13/23 11:24 AM   Result Value Ref Range    POC Glucose 267 (H) 65 - 105 mg/dL       Imaging/Diagnostics:  No results found. Assessment :      Hospital Problems             Last Modified POA    * (Principal) Bipolar 1 disorder, mixed, severe ([facility] 3/12/2023 Yes       Plan:     62-year-old lady with a history of alcohol abuse poor compliance  History of uncontrolled diabetes mellitus start Jardiance 10 mg daily  Lantus 30 units daily  Diet exercise and weight loss advised  Alcohol abuse folic acid 1 mg thiamine 100 mg  History of hypercoagulability due to antiphospholipid antibody syndrome Arixtra 10 mg subcu daily  Hypertension Cardizem CD daily 360 mg  Hyperlipidemia pravastatin 40 mg daily  Intertrigo miconazole 2% powder topically              Pamela Amaya MD  3/13/2023  4:06 PM    Copy sent to Dr. Rhonda Medina    Please note that this chart was generated using voice recognition Dragon dictation software.   Although every effort was made to ensure the

## 2023-03-13 NOTE — PLAN OF CARE
Problem: Discharge Planning  Goal: Discharge to home or other facility with appropriate resources  3/12/2023 2040 by Paty Pozo RN  Outcome: Adequate for Discharge     Problem: Pain  Goal: Verbalizes/displays adequate comfort level or baseline comfort level  3/12/2023 2040 by Paty Pozo RN  Outcome: Adequate for Discharge     Problem: Confusion  Goal: Confusion, delirium, dementia, or psychosis is improved or at baseline  Description: INTERVENTIONS:  1. Assess for possible contributors to thought disturbance, including medications, impaired vision or hearing, underlying metabolic abnormalities, dehydration, psychiatric diagnoses, and notify attending LIP  2. [place] high risk fall precautions, as indicated  3. Provide frequent short contacts to provide reality reorientation, refocusing and direction  4. Decrease environmental stimuli, including noise as appropriate  5. Monitor and intervene to maintain adequate nutrition, hydration, elimination, sleep and activity  6. If unable to ensure safety without constant attention obtain sitter and review sitter guidelines with assigned personnel  7.  Initiate Psychosocial CNS and Spiritual Care consult, as indicated  3/12/2023 2040 by Paty Pozo RN  Outcome: Adequate for Discharge     Problem: ABCDS Injury Assessment  Goal: Absence of physical injury  3/12/2023 2040 by Paty oPzo RN  Outcome: Adequate for Discharge     Problem: Safety - Adult  Goal: Free from fall injury  3/12/2023 2040 by Paty Pozo RN  Outcome: Adequate for Discharge     Problem: Chronic Conditions and Co-morbidities  Goal: Patient's chronic conditions and co-morbidity symptoms are monitored and maintained or improved  3/12/2023 2040 by Paty Pozo RN  Outcome: Adequate for Discharge

## 2023-03-13 NOTE — CARE COORDINATION
BHI Biopsychosocial Assessment    Current Level of Psychosocial Functioning     Independent   Dependent  X   Minimal Assist       Psychosocial High Risk Factors (check all that apply)    Unable to obtain meds   Chronic illness/pain    Substance abuse X Alcohol abuse   Lack of Family Support   Financial stress   Isolation X   Inadequate Community Resources  Suicide attempt(s)    Not taking medications   Victim of crime   Developmental Delay  Unable to manage personal needs  X   Age 71 or older   Homeless  No transportation   Readmission within 30 days  Unemployment  Traumatic Event    Psychiatric Advanced Directives: none reported     Family to Involve in Treatment: Pt reports that her mother is supportive and involved in her care. Sexual Orientation:  MAX    Patient Strengths:adequate housing, family support, linked with [facility]/ [place], reports medication compliance    Patient Barriers: reports sudden onset of auditory and visual hallucinations that she has never contacted before. Hx of Alcohol use     Opiate Education Provided: N/A Pt denies and does not have any documented history of Opiate or Heroin use/abuse. Pt reports history of daily Alcohol use in the past. Pt denies any illicit drug use and her drug screen was negative  fot all substances upon admission. CMHC/mental health history: Linked with [facility], stable housing in [place], [place] with ex-, [address], experiencing delusions and Auditory and visual hallucinations and Patient is reporting that this is the first time it has ever happened, 1st Psychiatric hospitalization. Plan of Care   medication management, group/individual therapies, family meetings, psycho -education, treatment team meetings to assist with stabilization    Initial Discharge Plan:  Pt reports a plan to return to her home in [place], [place] with her ex .  Pt also reports a plan to follow up with outpatient Treatment at [facility]

## 2023-03-13 NOTE — GROUP NOTE
Group Therapy Note    Date: 3/13/2023    Group Start Time: 1100  Group End Time: 8005  Group Topic: Cognitive Skills    KAROLINA [facility] D    Peggy Fowler, CTRS        Group Therapy Note    Attendees: 9/22     Topic: Socialization, practice/improve problem solving and communication skills        Goal of Group: To increase social interaction and to practice/improve problem solving and communication skills . Comments:      Patient did not participate in Cognitive Skills Group at 11:00, despite staff encouragement and explanation of benefits. Patient remains seclusive to self in room during group time . Q15 minute safety checks maintained for patient safety and will continue to encourage patient to attend unit programming.             Discipline Responsible: Psychoeducational Specialist        Signature:  Rajeev Mcdermott

## 2023-03-13 NOTE — GROUP NOTE
Group Therapy Note    Date: 3/13/2023    Group Start Time: 0920  Group End Time: 7587  Group Topic: Community Meeting    KAROLINA Tamayo LPN         Refused to attend community morning group due to resting in bed, refused 1;1 talk time       Signature:  Hunter Loza LPN

## 2023-03-13 NOTE — H&P
Comment: Pt stole her mom's Benzo 1 yr ago. Pt said she took more than prescribed dose of Valium once in late 90's. As mentioned above patient endorses history of alcohol abuse which she states last abused \"couple years ago\" and states to have been drinking \"1 gallon of alcohol daily at the time\". She endorses \"occasional alcohol use currently\", last reported use 4 days ago,where she reports drinking \"20 shots of fireball and a course of 1 day\". Patient endorses history of opioid abuse, unable to share last use, denies currently. She denies nicotine, cannabis or illicit drug use currently. On 3/10/2023 blood alcohol level and urine tox are negative. PDMP reviewed, Lorazepam 1 Mg 30 quantity, 30-day supply filled on 2/16/2023          LEGAL HISTORY:   HISTORY OF INCARCERATION: [] Yes [x] No    Family History:       Problem Relation Age of Onset    Depression Mother     High Blood Pressure Mother     High Cholesterol Mother     Diabetes Father     Heart Disease Father     Kidney Disease Father     High Blood Pressure Father     High Cholesterol Father     Cancer Maternal Uncle         of duodenum had whipple    Breast Cancer Maternal Aunt     Breast Cancer Maternal Cousin        Psychiatric Family History  Patient endorses psychiatric family history. Grandmother - schizophrenia    Suicides in family: [] Yes [x] No    Substance use in family: [x] Yes [] No         PHYSICAL EXAM:  Vitals:  /69   Pulse (!) 102   Temp 98.3 °F (36.8 °C) (Oral)   Resp 18   Ht 5' 8\" (1.727 m)   Wt 249 lb (112.9 kg)   SpO2 99%   BMI 37.86 kg/m²   Pain Level: Denies pain or discomfort    LABS:  Labs reviewed: [x] Yes  Metabolic Screening:  [] Yes [] No   6/9/2029 hemoglobin A1c reviewed  3/14/2022 lipid panel reviewed    Last EKG in EMR reviewed: [x] Yes  11/17/2022 QTc 445       Review of Systems   Constitutional: Negative for chills and weight loss. HENT: Negative for ear pain and nosebleeds.     Eyes: Negative for

## 2023-03-13 NOTE — GROUP NOTE
Group Therapy Note    Date: 3/13/2023    Group Start Time: 3856  Group End Time: 2712  Group Topic: Cognitive Skills    LUNA Mike        Group Therapy Note    Attendees: 8/17     Topic: Socialization, practice expressing feelings, and explore/practice positive coping skills, and stress management. Goal of Group: To increase social interaction and to practice expressing feelings,and explore/practice positive coping skills, and stress management using creative expression and discussion . Comments:      Patient did not participate in Cognitive Skills Group at 14:35, despite staff encouragement and explanation of benefits. Patient remains seclusive to self in room during group time . Q15 minute safety checks maintained for patient safety and will continue to encourage patient to attend unit programming.             Discipline Responsible: Psychoeducational Specialist        Signature:  Jodi Rob Shirin

## 2023-03-14 LAB
GLUCOSE BLD-MCNC: 134 MG/DL (ref 65–105)
GLUCOSE BLD-MCNC: 228 MG/DL (ref 65–105)
GLUCOSE BLD-MCNC: 254 MG/DL (ref 65–105)
GLUCOSE BLD-MCNC: 93 MG/DL (ref 65–105)

## 2023-03-14 PROCEDURE — 6370000000 HC RX 637 (ALT 250 FOR IP)

## 2023-03-14 PROCEDURE — 1240000000 HC EMOTIONAL WELLNESS R&B

## 2023-03-14 PROCEDURE — 6370000000 HC RX 637 (ALT 250 FOR IP): Performed by: INTERNAL MEDICINE

## 2023-03-14 PROCEDURE — 6360000002 HC RX W HCPCS: Performed by: INTERNAL MEDICINE

## 2023-03-14 PROCEDURE — 6370000000 HC RX 637 (ALT 250 FOR IP): Performed by: PSYCHIATRY & NEUROLOGY

## 2023-03-14 PROCEDURE — 82947 ASSAY GLUCOSE BLOOD QUANT: CPT

## 2023-03-14 RX ORDER — OLANZAPINE 10 MG/1
10 TABLET ORAL NIGHTLY
Status: DISCONTINUED | OUTPATIENT
Start: 2023-03-14 | End: 2023-03-17

## 2023-03-14 RX ADMIN — INSULIN GLARGINE 24 UNITS: 100 INJECTION, SOLUTION SUBCUTANEOUS at 08:42

## 2023-03-14 RX ADMIN — LOSARTAN POTASSIUM 50 MG: 50 TABLET, FILM COATED ORAL at 08:27

## 2023-03-14 RX ADMIN — ACYCLOVIR 400 MG: 200 CAPSULE ORAL at 21:00

## 2023-03-14 RX ADMIN — FONDAPARINUX SODIUM 10 MG: 10 INJECTION, SOLUTION SUBCUTANEOUS at 08:44

## 2023-03-14 RX ADMIN — INSULIN LISPRO 4 UNITS: 100 INJECTION, SOLUTION INTRAVENOUS; SUBCUTANEOUS at 12:15

## 2023-03-14 RX ADMIN — FOLIC ACID 1 MG: 1 TABLET ORAL at 08:27

## 2023-03-14 RX ADMIN — INSULIN LISPRO 2 UNITS: 100 INJECTION, SOLUTION INTRAVENOUS; SUBCUTANEOUS at 17:16

## 2023-03-14 RX ADMIN — ACYCLOVIR 400 MG: 200 CAPSULE ORAL at 14:23

## 2023-03-14 RX ADMIN — DILTIAZEM HYDROCHLORIDE 360 MG: 180 CAPSULE, COATED, EXTENDED RELEASE ORAL at 08:26

## 2023-03-14 RX ADMIN — METOPROLOL SUCCINATE 25 MG: 25 TABLET, EXTENDED RELEASE ORAL at 08:27

## 2023-03-14 RX ADMIN — ACYCLOVIR 400 MG: 200 CAPSULE ORAL at 08:26

## 2023-03-14 RX ADMIN — LORAZEPAM 1 MG: 1 TABLET ORAL at 21:00

## 2023-03-14 RX ADMIN — MULTIPLE VITAMINS W/ MINERALS TAB 1 TABLET: TAB at 08:26

## 2023-03-14 RX ADMIN — OLANZAPINE 10 MG: 10 TABLET, FILM COATED ORAL at 21:01

## 2023-03-14 RX ADMIN — THIAMINE HCL TAB 100 MG 100 MG: 100 TAB at 08:26

## 2023-03-14 RX ADMIN — PRAVASTATIN SODIUM 40 MG: 40 TABLET ORAL at 08:26

## 2023-03-14 RX ADMIN — FLUOXETINE 40 MG: 20 CAPSULE ORAL at 08:27

## 2023-03-14 ASSESSMENT — PAIN SCALES - GENERAL: PAINLEVEL_OUTOF10: 0

## 2023-03-14 NOTE — GROUP NOTE
Group Therapy Note    Date: 3/14/2023    Group Start Time: 1100  Group End Time: 0418  Group Topic: Cognitive Skills    KAROLINA BHLUNA Smalls        Group Therapy Note    Attendees: 9/19     Patient's Goal : To increase social interaction and to practice/improve problem solving,   crisis management, and communication skills          Notes: Pt participated fully in task. Pt was able to practice/improve problem solving, crisis management, and communication skills . Pt was pleasant and cooperative. Status After Intervention: Improved         Participation Level: Active Listener ,sharing , supportive     Participation Quality:  Attentive , sharing ,supportive     Speech:  Normal        Thought Process/Content: Logical answers r/t task . Affective Functioning: Congruent, brightens. Mood: Euthymic        Level of consciousness:  Alert, attentive     Response to Learning: Able to express current knowledge/experience, able to verbalize/acknowledge new learning , and Progressing to goal        Endings: None Reported     Modes of Intervention: Education, Support, Socialization, Exploration, Clarifying and Problem-solving.         Discipline Responsible: Psychoeducational Specialist      Signature:  Melisa Toney

## 2023-03-14 NOTE — GROUP NOTE
Group Therapy Note    Date: 3/14/2023    Group Start Time: 0900  Group End Time: 0945  Group Topic: Community Meeting    KAROLINA Gutierrezs, BONY        Group Therapy Note    Attendees: 7/18       Patient's Goal:  Socialize, decrease isolation    Status After Intervention:  Improved    Participation Level:  Active Listener    Participation Quality: Appropriate, Attentive, and Sharing      Speech:  normal      Thought Process/Content: Logical      Affective Functioning: Constricted/Restricted      Mood: anxious      Level of consciousness:  Oriented x4      Response to Learning: Able to verbalize current knowledge/experience, Able to verbalize/acknowledge new learning, and Able to retain information      Endings: None Reported    Modes of Intervention: Education, Support, and Socialization      Discipline Responsible: Licensed Practical Nurse      Signature:  Frantz Soto LPN

## 2023-03-14 NOTE — GROUP NOTE
Group Therapy Note    Date: 3/14/2023    Group Start Time: 1330  Group End Time: 5779  Group Topic: Cognitive Skills    KAROLINA Heard CTRS        Group Therapy Note    Attendees: 7/18     Patient's Goal : To increase social interaction and to practice/improve problem solving,   crisis management, and communication skills          Notes: Pt participated fully in task. Pt was able to practice/improve problem solving, crisis management, and communication skills . Pt was pleasant and cooperative. Status After Intervention: Improved         Participation Level: Active Listener ,sharing , supportive     Participation Quality:  Attentive , sharing ,supportive     Speech:  Normal        Thought Process/Content: Logical answers r/t task . Affective Functioning: Congruent, brightens. Mood: Euthymic        Level of consciousness:  Alert, attentive     Response to Learning: Able to express current knowledge/experience, able to verbalize/acknowledge new learning , and Progressing to goal        Endings: None Reported     Modes of Intervention: Education, Support, Socialization, Exploration, Clarifying and Problem-solving.         Discipline Responsible: Psychoeducational Specialist      Signature:  Kaitlyn Salas

## 2023-03-15 LAB
GLUCOSE BLD-MCNC: 119 MG/DL (ref 65–105)
GLUCOSE BLD-MCNC: 228 MG/DL (ref 65–105)
GLUCOSE BLD-MCNC: 245 MG/DL (ref 65–105)
GLUCOSE BLD-MCNC: 261 MG/DL (ref 65–105)

## 2023-03-15 PROCEDURE — 6370000000 HC RX 637 (ALT 250 FOR IP)

## 2023-03-15 PROCEDURE — 6370000000 HC RX 637 (ALT 250 FOR IP): Performed by: INTERNAL MEDICINE

## 2023-03-15 PROCEDURE — 82947 ASSAY GLUCOSE BLOOD QUANT: CPT

## 2023-03-15 PROCEDURE — 6370000000 HC RX 637 (ALT 250 FOR IP): Performed by: PSYCHIATRY & NEUROLOGY

## 2023-03-15 PROCEDURE — 6360000002 HC RX W HCPCS: Performed by: INTERNAL MEDICINE

## 2023-03-15 PROCEDURE — 1240000000 HC EMOTIONAL WELLNESS R&B

## 2023-03-15 RX ADMIN — ACYCLOVIR 400 MG: 200 CAPSULE ORAL at 14:00

## 2023-03-15 RX ADMIN — LOSARTAN POTASSIUM 50 MG: 50 TABLET, FILM COATED ORAL at 08:20

## 2023-03-15 RX ADMIN — INSULIN GLARGINE 24 UNITS: 100 INJECTION, SOLUTION SUBCUTANEOUS at 08:17

## 2023-03-15 RX ADMIN — ACYCLOVIR 400 MG: 200 CAPSULE ORAL at 08:19

## 2023-03-15 RX ADMIN — OLANZAPINE 10 MG: 10 TABLET, FILM COATED ORAL at 21:42

## 2023-03-15 RX ADMIN — INSULIN LISPRO 2 UNITS: 100 INJECTION, SOLUTION INTRAVENOUS; SUBCUTANEOUS at 11:53

## 2023-03-15 RX ADMIN — METOPROLOL SUCCINATE 25 MG: 25 TABLET, EXTENDED RELEASE ORAL at 08:20

## 2023-03-15 RX ADMIN — THIAMINE HCL TAB 100 MG 100 MG: 100 TAB at 08:20

## 2023-03-15 RX ADMIN — LORAZEPAM 1 MG: 1 TABLET ORAL at 21:43

## 2023-03-15 RX ADMIN — INSULIN LISPRO 4 UNITS: 100 INJECTION, SOLUTION INTRAVENOUS; SUBCUTANEOUS at 17:44

## 2023-03-15 RX ADMIN — FLUOXETINE 40 MG: 20 CAPSULE ORAL at 08:19

## 2023-03-15 RX ADMIN — FOLIC ACID 1 MG: 1 TABLET ORAL at 08:20

## 2023-03-15 RX ADMIN — ACYCLOVIR 400 MG: 200 CAPSULE ORAL at 21:41

## 2023-03-15 RX ADMIN — MULTIPLE VITAMINS W/ MINERALS TAB 1 TABLET: TAB at 08:20

## 2023-03-15 RX ADMIN — DILTIAZEM HYDROCHLORIDE 360 MG: 180 CAPSULE, COATED, EXTENDED RELEASE ORAL at 08:20

## 2023-03-15 RX ADMIN — ACETAMINOPHEN 650 MG: 325 TABLET ORAL at 21:42

## 2023-03-15 RX ADMIN — PRAVASTATIN SODIUM 40 MG: 40 TABLET ORAL at 08:20

## 2023-03-15 RX ADMIN — FONDAPARINUX SODIUM 10 MG: 10 INJECTION, SOLUTION SUBCUTANEOUS at 08:18

## 2023-03-15 RX ADMIN — TRAZODONE HYDROCHLORIDE 50 MG: 50 TABLET ORAL at 21:43

## 2023-03-15 ASSESSMENT — PAIN SCALES - GENERAL: PAINLEVEL_OUTOF10: 7

## 2023-03-15 ASSESSMENT — PAIN DESCRIPTION - DESCRIPTORS: DESCRIPTORS: DULL

## 2023-03-15 ASSESSMENT — PAIN DESCRIPTION - LOCATION: LOCATION: BACK;NECK

## 2023-03-15 ASSESSMENT — PAIN - FUNCTIONAL ASSESSMENT: PAIN_FUNCTIONAL_ASSESSMENT: ACTIVITIES ARE NOT PREVENTED

## 2023-03-15 NOTE — GROUP NOTE
Group Therapy Note    Date: 3/14/2023    Group Start Time:   Group End Time:   Group Topic: Wrap-Up    [facility]    Liana Jolley RN; Salty Hendricks RN        Group Therapy Note    Attendees:            Patient's Goal:  ***    Notes:  ***    Status After Intervention:  {Status After Intervention:918480449}    Participation Level: {Participation Level:120089453}    Participation Quality: {[facility] PARTICIPATION QUALITY:937058405}      Speech:  {[facility] CD_SPEECH:99054}      Thought Process/Content: {Thought Process/Content:193620719}      Affective Functioning: {Affective Functionin}      Mood: {Mood:152751293}      Level of consciousness:  {Level of consciousness:983738500}      Response to Learnin Adenike Anuj BHI Responses to Learnin}      Endings: {[facility] Endings:33175}    Modes of Intervention: {[facility] Modes of Intervention:087461085}      Discipline Responsible: Silviano Leslie PATEL Multidisciplinary:[id number]}      Signature:  Salty Hendricks RN

## 2023-03-15 NOTE — GROUP NOTE
Group Therapy Note    Date: 3/14/2023    Group Start Time: 2015  Group End Time: 2035  Group Topic: Wrap-Up    [facility] D    Clarisa Kimble RN; Louisa Tyler RN        Group Therapy Note    Attendees: 9       Patient's Goal:  goal review    Notes:  active listener/participator     Status After Intervention:  Unchanged    Participation Level:  Active Listener    Participation Quality: Appropriate, Attentive, Sharing, and Supportive      Speech:  normal      Thought Process/Content: Logical  Linear      Affective Functioning: Congruent      Mood: anxious and depressed      Level of consciousness:  Alert, Oriented x4, and Attentive      Response to Learning: Able to verbalize current knowledge/experience, Able to verbalize/acknowledge new learning, Able to retain information, Capable of insight, Able to change behavior, and Progressing to goal      Endings: None Reported    Modes of Intervention: Education, Support, and Socialization      Discipline Responsible: Registered Nurse      Signature:  Louisa Tyler RN

## 2023-03-15 NOTE — GROUP NOTE
Group Therapy Note    Date: 3/15/2023    Group Start Time: 0900  Group End Time: 0920  Group Topic: Group Documentation    [facility]    Beatriz Mclain LPN        Group Therapy Note    Attendees: goals groups 6/18       Patient's Goal:  Avoid isolation    Notes:  tolerated    Status After Intervention:  Improved    Participation Level:  Active Listener    Participation Quality: Appropriate, Attentive, and Sharing      Speech:  normal      Thought Process/Content: Logical      Affective Functioning: Congruent      Mood: euthymic      Level of consciousness:  Alert and Oriented x4      Response to Learning: Able to verbalize current knowledge/experience, Able to change behavior, and Progressing to goal      Endings: None Reported    Modes of Intervention: Education, Support, and Socialization      Discipline Responsible: Licensed Practical Nurse      Signature:  Beatriz Mclain LPN

## 2023-03-15 NOTE — GROUP NOTE
Group Therapy Note    Date: 3/15/2023    Group Start Time: 1050  Group End Time: 0424  Group Topic: Psychoeducation    [facility] D    LUNA York        Group Therapy Note    Attendees: 9/18       Patient's Goal:  pt will demonstrate improved communication skills and improved coping skills     Notes:  pt is pleasant and participated fully    Status After Intervention:  improved    Participation Level: appropriate     Participation Quality: appropriate       Speech:  normal      Thought Process/Content: logical      Affective Functioning: flat      Mood:depressed      Level of consciousness:  Alert      Response to Learning: Able to verbalize current knowledge/experience and Progressing to goal      Endings: None Reported    Modes of Intervention: Education, Support, and Activity      Discipline Responsible: Psychoeducational Specialist      Signature:  Katerine Dale

## 2023-03-16 LAB
GLUCOSE BLD-MCNC: 158 MG/DL (ref 65–105)
GLUCOSE BLD-MCNC: 214 MG/DL (ref 65–105)
GLUCOSE BLD-MCNC: 239 MG/DL (ref 65–105)
GLUCOSE BLD-MCNC: 254 MG/DL (ref 65–105)

## 2023-03-16 PROCEDURE — 82947 ASSAY GLUCOSE BLOOD QUANT: CPT

## 2023-03-16 PROCEDURE — 6370000000 HC RX 637 (ALT 250 FOR IP)

## 2023-03-16 PROCEDURE — 6370000000 HC RX 637 (ALT 250 FOR IP): Performed by: INTERNAL MEDICINE

## 2023-03-16 PROCEDURE — 6370000000 HC RX 637 (ALT 250 FOR IP): Performed by: PSYCHIATRY & NEUROLOGY

## 2023-03-16 PROCEDURE — 1240000000 HC EMOTIONAL WELLNESS R&B

## 2023-03-16 PROCEDURE — 6360000002 HC RX W HCPCS: Performed by: INTERNAL MEDICINE

## 2023-03-16 RX ORDER — NYSTATIN 100000 U/G
OINTMENT TOPICAL 2 TIMES DAILY
Status: DISCONTINUED | OUTPATIENT
Start: 2023-03-16 | End: 2023-03-20 | Stop reason: HOSPADM

## 2023-03-16 RX ORDER — CYCLOBENZAPRINE HCL 10 MG
10 TABLET ORAL 3 TIMES DAILY PRN
Status: DISCONTINUED | OUTPATIENT
Start: 2023-03-16 | End: 2023-03-20 | Stop reason: HOSPADM

## 2023-03-16 RX ADMIN — ACYCLOVIR 400 MG: 200 CAPSULE ORAL at 08:31

## 2023-03-16 RX ADMIN — DILTIAZEM HYDROCHLORIDE 360 MG: 180 CAPSULE, COATED, EXTENDED RELEASE ORAL at 08:31

## 2023-03-16 RX ADMIN — PRAVASTATIN SODIUM 40 MG: 40 TABLET ORAL at 08:31

## 2023-03-16 RX ADMIN — FOLIC ACID 1 MG: 1 TABLET ORAL at 08:31

## 2023-03-16 RX ADMIN — THIAMINE HCL TAB 100 MG 100 MG: 100 TAB at 08:31

## 2023-03-16 RX ADMIN — LORAZEPAM 1 MG: 1 TABLET ORAL at 21:51

## 2023-03-16 RX ADMIN — METOPROLOL SUCCINATE 25 MG: 25 TABLET, EXTENDED RELEASE ORAL at 08:31

## 2023-03-16 RX ADMIN — LOSARTAN POTASSIUM 50 MG: 50 TABLET, FILM COATED ORAL at 08:31

## 2023-03-16 RX ADMIN — INSULIN LISPRO 2 UNITS: 100 INJECTION, SOLUTION INTRAVENOUS; SUBCUTANEOUS at 12:05

## 2023-03-16 RX ADMIN — FONDAPARINUX SODIUM 10 MG: 10 INJECTION, SOLUTION SUBCUTANEOUS at 08:30

## 2023-03-16 RX ADMIN — NYSTATIN OINTMENT: 100000 OINTMENT TOPICAL at 11:40

## 2023-03-16 RX ADMIN — INSULIN LISPRO 2 UNITS: 100 INJECTION, SOLUTION INTRAVENOUS; SUBCUTANEOUS at 17:05

## 2023-03-16 RX ADMIN — FLUOXETINE 40 MG: 20 CAPSULE ORAL at 08:30

## 2023-03-16 RX ADMIN — CYCLOBENZAPRINE 10 MG: 10 TABLET, FILM COATED ORAL at 21:48

## 2023-03-16 RX ADMIN — ACYCLOVIR 400 MG: 200 CAPSULE ORAL at 21:47

## 2023-03-16 RX ADMIN — TRAZODONE HYDROCHLORIDE 50 MG: 50 TABLET ORAL at 21:48

## 2023-03-16 RX ADMIN — MULTIPLE VITAMINS W/ MINERALS TAB 1 TABLET: TAB at 08:31

## 2023-03-16 RX ADMIN — HYDROXYZINE HYDROCHLORIDE 50 MG: 50 TABLET, FILM COATED ORAL at 21:48

## 2023-03-16 RX ADMIN — INSULIN GLARGINE 24 UNITS: 100 INJECTION, SOLUTION SUBCUTANEOUS at 08:29

## 2023-03-16 RX ADMIN — OLANZAPINE 10 MG: 10 TABLET, FILM COATED ORAL at 21:48

## 2023-03-16 RX ADMIN — CYCLOBENZAPRINE 10 MG: 10 TABLET, FILM COATED ORAL at 17:04

## 2023-03-16 RX ADMIN — ACYCLOVIR 400 MG: 200 CAPSULE ORAL at 13:45

## 2023-03-16 RX ADMIN — ACETAMINOPHEN 650 MG: 325 TABLET ORAL at 12:07

## 2023-03-16 ASSESSMENT — PAIN SCALES - GENERAL
PAINLEVEL_OUTOF10: 6
PAINLEVEL_OUTOF10: 0

## 2023-03-16 NOTE — GROUP NOTE
Group Therapy Note    Date: 3/16/2023    Group Start Time: 1430  Group End Time: 1816  Group Topic: Cognitive Skills    LUNA Gonsalez        Group Therapy Note    Attendees: 12/20     Patient's Goal:  To increase social interaction and to practice expressing feelings, and explore  stress management, and healthy boundaries. Notes: Pt participated fully in group. Pt was able to practice expressing feelings,and explore stress management, and healthy boundaries using creative expression and discussion . Pt shared individually and engaged in group discussion with peers and RT. Pt was supportive of peers and able to relate to some of their ideas and experiences. Status After Intervention:  Improved         Participation Level: Active Listener ,sharing ,supportive     Participation Quality:  Attentive , sharing , supportive     Speech:  Normal     Thought Process/Content: Logical , linear r/t task and topic. Affective Functioning: Congruent, brightened     Mood: Euthymic        Level of consciousness:  Alert, and Attentive     Response to Learning: Able to express current knowledge/experience , Able to acknowledge/verbalize new learning, able to verbalize insight, and Progressing to goal        Endings: None Reported     Modes of Intervention: Education, Support, Socialization, Exploration, Clarifying and Problem-solving.         Discipline Responsible: Psychoeducational Specialist     Alesia Aguayo

## 2023-03-16 NOTE — GROUP NOTE
Group Therapy Note    Date: 3/16/2023    Group Start Time: 0900  Group End Time: 0915  Group Topic: Community Meeting    KAROLINA Zacarias        Group Therapy Note    Attendees: 7/20       Patient's Goal:  To stay together emotionally     Notes:  Goals Group    Status After Intervention:  Unchanged    Participation Level:  Active Listener and Interactive    Participation Quality: Appropriate, Attentive, Sharing, and Supportive      Speech:  normal      Thought Process/Content: Logical  Linear      Affective Functioning: Congruent      Mood: euthymic      Level of consciousness:  Alert and Oriented x4      Response to Learning: Progressing to goal      Endings: None Reported    Modes of Intervention: Support, Socialization, and Exploration      Discipline Responsible: Behavorial Health Tech      Signature:  Sofie Zacarias

## 2023-03-16 NOTE — GROUP NOTE
Group Therapy Note    Date: 3/16/2023    Group Start Time: 1100  Group End Time: 1150  Group Topic: Cognitive Skills    [facility] D    STEPHANIE KernsS        Group Therapy Note    Attendees: 12/20     Patient's Goal : To increase social interaction and to practice/improve decison making, and communication skills           Notes: Pt participated fully in task. Pt was able to practice/improve decison making, and communication skills . Pt was pleasant and cooperative. Status After Intervention: Improved         Participation Level: Active Listener ,sharing , supportive     Participation Quality:  Attentive , sharing ,supportive     Speech:  Normal        Thought Process/Content: Logical ,linear answers r/t task . Affective Functioning: Congruent, brightens. Mood: Euthymic        Level of consciousness:  Alert, attentive     Response to Learning: Able to express current knowledge/experience, able to verbalize/acknowledge new learning , and Progressing to goal        Endings: None Reported     Modes of Intervention: Education, Support, Socialization, Exploration, Clarifying and Problem-solving.         Discipline Responsible: Psychoeducational Specialist      Signature:  Amena Priest

## 2023-03-17 LAB
GLUCOSE BLD-MCNC: 155 MG/DL (ref 65–105)
GLUCOSE BLD-MCNC: 194 MG/DL (ref 65–105)
GLUCOSE BLD-MCNC: 274 MG/DL (ref 65–105)
GLUCOSE BLD-MCNC: 280 MG/DL (ref 65–105)

## 2023-03-17 PROCEDURE — 1240000000 HC EMOTIONAL WELLNESS R&B

## 2023-03-17 PROCEDURE — 6370000000 HC RX 637 (ALT 250 FOR IP): Performed by: NURSE PRACTITIONER

## 2023-03-17 PROCEDURE — 6370000000 HC RX 637 (ALT 250 FOR IP): Performed by: INTERNAL MEDICINE

## 2023-03-17 PROCEDURE — 6370000000 HC RX 637 (ALT 250 FOR IP): Performed by: PSYCHIATRY & NEUROLOGY

## 2023-03-17 PROCEDURE — 6360000002 HC RX W HCPCS: Performed by: INTERNAL MEDICINE

## 2023-03-17 PROCEDURE — 6370000000 HC RX 637 (ALT 250 FOR IP)

## 2023-03-17 PROCEDURE — 82947 ASSAY GLUCOSE BLOOD QUANT: CPT

## 2023-03-17 RX ORDER — BUMETANIDE 1 MG/1
1 TABLET ORAL DAILY
Status: DISCONTINUED | OUTPATIENT
Start: 2023-03-17 | End: 2023-03-20 | Stop reason: HOSPADM

## 2023-03-17 RX ORDER — OLANZAPINE 15 MG/1
15 TABLET ORAL NIGHTLY
Status: DISCONTINUED | OUTPATIENT
Start: 2023-03-17 | End: 2023-03-20 | Stop reason: HOSPADM

## 2023-03-17 RX ADMIN — ACETAMINOPHEN 650 MG: 325 TABLET ORAL at 11:10

## 2023-03-17 RX ADMIN — NYSTATIN OINTMENT: 100000 OINTMENT TOPICAL at 08:26

## 2023-03-17 RX ADMIN — PRAVASTATIN SODIUM 40 MG: 40 TABLET ORAL at 08:25

## 2023-03-17 RX ADMIN — THIAMINE HCL TAB 100 MG 100 MG: 100 TAB at 08:25

## 2023-03-17 RX ADMIN — ACYCLOVIR 400 MG: 200 CAPSULE ORAL at 14:24

## 2023-03-17 RX ADMIN — FONDAPARINUX SODIUM 10 MG: 10 INJECTION, SOLUTION SUBCUTANEOUS at 08:26

## 2023-03-17 RX ADMIN — INSULIN GLARGINE 24 UNITS: 100 INJECTION, SOLUTION SUBCUTANEOUS at 08:25

## 2023-03-17 RX ADMIN — ACYCLOVIR 400 MG: 200 CAPSULE ORAL at 21:33

## 2023-03-17 RX ADMIN — CYCLOBENZAPRINE 10 MG: 10 TABLET, FILM COATED ORAL at 21:33

## 2023-03-17 RX ADMIN — MULTIPLE VITAMINS W/ MINERALS TAB 1 TABLET: TAB at 08:25

## 2023-03-17 RX ADMIN — INSULIN LISPRO 4 UNITS: 100 INJECTION, SOLUTION INTRAVENOUS; SUBCUTANEOUS at 18:18

## 2023-03-17 RX ADMIN — FOLIC ACID 1 MG: 1 TABLET ORAL at 08:25

## 2023-03-17 RX ADMIN — FLUOXETINE 40 MG: 20 CAPSULE ORAL at 08:25

## 2023-03-17 RX ADMIN — LOSARTAN POTASSIUM 50 MG: 50 TABLET, FILM COATED ORAL at 08:25

## 2023-03-17 RX ADMIN — ACYCLOVIR 400 MG: 200 CAPSULE ORAL at 08:25

## 2023-03-17 RX ADMIN — CYCLOBENZAPRINE 10 MG: 10 TABLET, FILM COATED ORAL at 11:10

## 2023-03-17 RX ADMIN — METOPROLOL SUCCINATE 25 MG: 25 TABLET, EXTENDED RELEASE ORAL at 08:25

## 2023-03-17 RX ADMIN — INSULIN LISPRO 4 UNITS: 100 INJECTION, SOLUTION INTRAVENOUS; SUBCUTANEOUS at 11:52

## 2023-03-17 RX ADMIN — BUMETANIDE 1 MG: 1 TABLET ORAL at 11:56

## 2023-03-17 RX ADMIN — DILTIAZEM HYDROCHLORIDE 360 MG: 180 CAPSULE, COATED, EXTENDED RELEASE ORAL at 08:25

## 2023-03-17 RX ADMIN — OLANZAPINE 15 MG: 15 TABLET, FILM COATED ORAL at 21:33

## 2023-03-17 RX ADMIN — LORAZEPAM 1 MG: 1 TABLET ORAL at 21:33

## 2023-03-17 RX ADMIN — TRAZODONE HYDROCHLORIDE 50 MG: 50 TABLET ORAL at 21:33

## 2023-03-17 ASSESSMENT — PAIN - FUNCTIONAL ASSESSMENT: PAIN_FUNCTIONAL_ASSESSMENT: 0-10

## 2023-03-17 ASSESSMENT — PAIN SCALES - GENERAL: PAINLEVEL_OUTOF10: 4

## 2023-03-17 NOTE — GROUP NOTE
Group Therapy Note    Date: 3/17/2023    Group Start Time: 1330  Group End Time: 1496  Group Topic: Recreation Group     [facility] D    LUNA Billingsley        Group Therapy Note    Attendees 5/20       Patient's Goal:  pt will demonstrate improved leisure awareness and improved social skills     Notes:  pt was pleasant and participated well     Status After Intervention:  Improved    Participation Level:  Active Listener and Interactive    Participation Quality: Appropriate, Attentive, and Supportive      Speech:  normal      Thought Process/Content: Logical      Affective Functioning: Congruent      Mood: euthymic      Level of consciousness:  Alert      Response to Learning: Able to verbalize current knowledge/experience and Progressing to goal      Endings: None Reported    Modes of Intervention: Education, Support, and Problem-solving      Discipline Responsible: Psychoeducational Specialist      Signature:  Edi Lloyd

## 2023-03-17 NOTE — GROUP NOTE
Group Therapy Note    Date: 3/17/2023    Group Start Time: 6562  Group End Time: 0935  Group Topic: Group Documentation    [facility] A    Rui Jerez RN        Group Therapy Note    Attendees: 7/21       Patient's Goal:  Don't isolate, socialize with peers, make some calls    Notes:  Goals group    Status After Intervention:  Unchanged    Participation Level:  Active Listener and Interactive    Participation Quality: Appropriate, Attentive, and Sharing      Speech:  normal      Thought Process/Content: Logical  Linear      Affective Functioning: Congruent      Mood: Normal      Level of consciousness:  Alert, Oriented x4, and Attentive      Response to Learning: Able to verbalize current knowledge/experience and Able to verbalize/acknowledge new learning      Endings: None reported    Modes of Intervention: Support, Socialization, and Exploration      Discipline Responsible: [address]      Signature:  Rui Jerez RN

## 2023-03-18 LAB
GLUCOSE BLD-MCNC: 118 MG/DL (ref 65–105)
GLUCOSE BLD-MCNC: 124 MG/DL (ref 65–105)
GLUCOSE BLD-MCNC: 224 MG/DL (ref 65–105)
GLUCOSE BLD-MCNC: 259 MG/DL (ref 65–105)

## 2023-03-18 PROCEDURE — 6360000002 HC RX W HCPCS: Performed by: INTERNAL MEDICINE

## 2023-03-18 PROCEDURE — 1240000000 HC EMOTIONAL WELLNESS R&B

## 2023-03-18 PROCEDURE — 6370000000 HC RX 637 (ALT 250 FOR IP): Performed by: NURSE PRACTITIONER

## 2023-03-18 PROCEDURE — 6370000000 HC RX 637 (ALT 250 FOR IP): Performed by: INTERNAL MEDICINE

## 2023-03-18 PROCEDURE — 6370000000 HC RX 637 (ALT 250 FOR IP): Performed by: PSYCHIATRY & NEUROLOGY

## 2023-03-18 PROCEDURE — 6370000000 HC RX 637 (ALT 250 FOR IP)

## 2023-03-18 PROCEDURE — 82947 ASSAY GLUCOSE BLOOD QUANT: CPT

## 2023-03-18 RX ORDER — LORAZEPAM 0.5 MG/1
0.5 TABLET ORAL DAILY PRN
Status: DISCONTINUED | OUTPATIENT
Start: 2023-03-18 | End: 2023-03-20 | Stop reason: HOSPADM

## 2023-03-18 RX ORDER — TRAZODONE HYDROCHLORIDE 100 MG/1
100 TABLET ORAL NIGHTLY PRN
Status: DISCONTINUED | OUTPATIENT
Start: 2023-03-18 | End: 2023-03-20 | Stop reason: HOSPADM

## 2023-03-18 RX ADMIN — OLANZAPINE 15 MG: 15 TABLET, FILM COATED ORAL at 21:00

## 2023-03-18 RX ADMIN — CYCLOBENZAPRINE 10 MG: 10 TABLET, FILM COATED ORAL at 21:00

## 2023-03-18 RX ADMIN — CYCLOBENZAPRINE 10 MG: 10 TABLET, FILM COATED ORAL at 11:36

## 2023-03-18 RX ADMIN — TRAZODONE HYDROCHLORIDE 100 MG: 100 TABLET ORAL at 21:04

## 2023-03-18 RX ADMIN — FOLIC ACID 1 MG: 1 TABLET ORAL at 08:43

## 2023-03-18 RX ADMIN — METOPROLOL SUCCINATE 25 MG: 25 TABLET, EXTENDED RELEASE ORAL at 08:43

## 2023-03-18 RX ADMIN — DILTIAZEM HYDROCHLORIDE 360 MG: 180 CAPSULE, COATED, EXTENDED RELEASE ORAL at 08:43

## 2023-03-18 RX ADMIN — INSULIN GLARGINE 24 UNITS: 100 INJECTION, SOLUTION SUBCUTANEOUS at 08:42

## 2023-03-18 RX ADMIN — LORAZEPAM 0.5 MG: 0.5 TABLET ORAL at 21:00

## 2023-03-18 RX ADMIN — LOSARTAN POTASSIUM 50 MG: 50 TABLET, FILM COATED ORAL at 08:43

## 2023-03-18 RX ADMIN — FLUOXETINE 40 MG: 20 CAPSULE ORAL at 08:43

## 2023-03-18 RX ADMIN — ACYCLOVIR 400 MG: 200 CAPSULE ORAL at 08:43

## 2023-03-18 RX ADMIN — PRAVASTATIN SODIUM 40 MG: 40 TABLET ORAL at 08:43

## 2023-03-18 RX ADMIN — THIAMINE HCL TAB 100 MG 100 MG: 100 TAB at 08:43

## 2023-03-18 RX ADMIN — ACYCLOVIR 400 MG: 200 CAPSULE ORAL at 13:56

## 2023-03-18 RX ADMIN — ACETAMINOPHEN 650 MG: 325 TABLET ORAL at 20:59

## 2023-03-18 RX ADMIN — ACYCLOVIR 400 MG: 200 CAPSULE ORAL at 21:01

## 2023-03-18 RX ADMIN — HYDROXYZINE HYDROCHLORIDE 50 MG: 50 TABLET, FILM COATED ORAL at 18:36

## 2023-03-18 RX ADMIN — INSULIN LISPRO 4 UNITS: 100 INJECTION, SOLUTION INTRAVENOUS; SUBCUTANEOUS at 17:25

## 2023-03-18 RX ADMIN — FONDAPARINUX SODIUM 10 MG: 10 INJECTION, SOLUTION SUBCUTANEOUS at 08:42

## 2023-03-18 RX ADMIN — INSULIN LISPRO 2 UNITS: 100 INJECTION, SOLUTION INTRAVENOUS; SUBCUTANEOUS at 11:33

## 2023-03-18 RX ADMIN — BUMETANIDE 1 MG: 1 TABLET ORAL at 08:43

## 2023-03-18 RX ADMIN — MULTIPLE VITAMINS W/ MINERALS TAB 1 TABLET: TAB at 08:43

## 2023-03-18 ASSESSMENT — PAIN SCALES - GENERAL: PAINLEVEL_OUTOF10: 0

## 2023-03-18 NOTE — GROUP NOTE
Group Therapy Note    Date: 3/18/2023    Group Start Time: 0915  Group End Time: 1654  Group Topic: Community Meeting    KAROLINA Al        Group Therapy Note    Attendees: 7/21       Patient's Goal:  Write schedule out for home and make a list to work on with my     Notes:  controlled    Status After Intervention:  Unchanged    Participation Level: Active Listener and Interactive    Participation Quality: Appropriate and Attentive      Speech:  normal      Thought Process/Content: Logical      Affective Functioning: Congruent      Mood: anxious and depressed      Level of consciousness:  Oriented x4      Response to Learning: Able to verbalize current knowledge/experience and Progressing to goal      Endings: None Reported    Modes of Intervention: Education, Support, and Socialization      Discipline Responsible: Behavorial Health Tech      Signature:   Colin Al

## 2023-03-18 NOTE — GROUP NOTE
Group Therapy Note    Date: 3/18/2023    Group Start Time: 3367  Group End Time: 1450  Group Topic: Recreational    [address] BHLUNA Tinsley    Group Therapy Note    Attendees: 6/19       Patient's Goal:  Patient will demonstrate increased interpersonal skills and focus on task at hand. Notes:  Patient was on-time to group and actively participated in group activity. Status After Intervention:  Improved    Participation Level: Active Listener and Interactive    Participation Quality: Appropriate, Attentive, and Sharing      Speech:  normal      Thought Process/Content: Logical  Linear      Affective Functioning: Flat      Mood: depressed      Level of consciousness:  Alert, Oriented x4, and Attentive      Response to Learning: Able to verbalize current knowledge/experience, Able to verbalize/acknowledge new learning, and Progressing to goal      Endings: None Reported    Modes of Intervention: Socialization, Exploration, and Activity      Discipline Responsible: Psychoeducational Specialist      Signature:   Stella Garcia, [address]

## 2023-03-19 LAB
GLUCOSE BLD-MCNC: 147 MG/DL (ref 65–105)
GLUCOSE BLD-MCNC: 282 MG/DL (ref 65–105)
GLUCOSE BLD-MCNC: 356 MG/DL (ref 65–105)
GLUCOSE BLD-MCNC: 71 MG/DL (ref 65–105)

## 2023-03-19 PROCEDURE — 6370000000 HC RX 637 (ALT 250 FOR IP): Performed by: INTERNAL MEDICINE

## 2023-03-19 PROCEDURE — 6370000000 HC RX 637 (ALT 250 FOR IP)

## 2023-03-19 PROCEDURE — 6370000000 HC RX 637 (ALT 250 FOR IP): Performed by: NURSE PRACTITIONER

## 2023-03-19 PROCEDURE — 1240000000 HC EMOTIONAL WELLNESS R&B

## 2023-03-19 PROCEDURE — 82947 ASSAY GLUCOSE BLOOD QUANT: CPT

## 2023-03-19 PROCEDURE — 6360000002 HC RX W HCPCS: Performed by: INTERNAL MEDICINE

## 2023-03-19 RX ORDER — BENZTROPINE MESYLATE 1 MG/1
1 TABLET ORAL 2 TIMES DAILY
Status: DISCONTINUED | OUTPATIENT
Start: 2023-03-19 | End: 2023-03-20 | Stop reason: HOSPADM

## 2023-03-19 RX ADMIN — METOPROLOL SUCCINATE 25 MG: 25 TABLET, EXTENDED RELEASE ORAL at 08:42

## 2023-03-19 RX ADMIN — PRAVASTATIN SODIUM 40 MG: 40 TABLET ORAL at 08:42

## 2023-03-19 RX ADMIN — CYCLOBENZAPRINE 10 MG: 10 TABLET, FILM COATED ORAL at 08:42

## 2023-03-19 RX ADMIN — TRAZODONE HYDROCHLORIDE 100 MG: 100 TABLET ORAL at 20:51

## 2023-03-19 RX ADMIN — INSULIN GLARGINE 24 UNITS: 100 INJECTION, SOLUTION SUBCUTANEOUS at 08:42

## 2023-03-19 RX ADMIN — OLANZAPINE 15 MG: 15 TABLET, FILM COATED ORAL at 20:52

## 2023-03-19 RX ADMIN — ACYCLOVIR 400 MG: 200 CAPSULE ORAL at 08:41

## 2023-03-19 RX ADMIN — LOSARTAN POTASSIUM 50 MG: 50 TABLET, FILM COATED ORAL at 08:42

## 2023-03-19 RX ADMIN — MULTIPLE VITAMINS W/ MINERALS TAB 1 TABLET: TAB at 08:41

## 2023-03-19 RX ADMIN — INSULIN LISPRO 4 UNITS: 100 INJECTION, SOLUTION INTRAVENOUS; SUBCUTANEOUS at 20:52

## 2023-03-19 RX ADMIN — ACYCLOVIR 400 MG: 200 CAPSULE ORAL at 14:40

## 2023-03-19 RX ADMIN — FOLIC ACID 1 MG: 1 TABLET ORAL at 08:41

## 2023-03-19 RX ADMIN — FONDAPARINUX SODIUM 10 MG: 10 INJECTION, SOLUTION SUBCUTANEOUS at 08:40

## 2023-03-19 RX ADMIN — ACYCLOVIR 400 MG: 200 CAPSULE ORAL at 20:51

## 2023-03-19 RX ADMIN — LORAZEPAM 0.5 MG: 0.5 TABLET ORAL at 20:51

## 2023-03-19 RX ADMIN — BUMETANIDE 1 MG: 1 TABLET ORAL at 08:42

## 2023-03-19 RX ADMIN — THIAMINE HCL TAB 100 MG 100 MG: 100 TAB at 08:41

## 2023-03-19 RX ADMIN — BENZTROPINE MESYLATE 1 MG: 1 TABLET ORAL at 20:52

## 2023-03-19 RX ADMIN — CYCLOBENZAPRINE 10 MG: 10 TABLET, FILM COATED ORAL at 20:52

## 2023-03-19 RX ADMIN — DILTIAZEM HYDROCHLORIDE 360 MG: 180 CAPSULE, COATED, EXTENDED RELEASE ORAL at 08:41

## 2023-03-19 RX ADMIN — FLUOXETINE 40 MG: 20 CAPSULE ORAL at 08:42

## 2023-03-19 RX ADMIN — INSULIN LISPRO 4 UNITS: 100 INJECTION, SOLUTION INTRAVENOUS; SUBCUTANEOUS at 11:36

## 2023-03-19 ASSESSMENT — PAIN DESCRIPTION - DESCRIPTORS: DESCRIPTORS: ACHING

## 2023-03-19 ASSESSMENT — PAIN DESCRIPTION - LOCATION: LOCATION: BACK

## 2023-03-19 NOTE — GROUP NOTE
Group Therapy Note    Date: 3/19/2023    Group Start Time: 1400  Group End Time: 1500  Group Topic: Relaxation    [facility] LUNA Freire        Group Therapy Note    Attendees: 8/23     Patient's Goal : To increase social interaction and to explore creative expression, and coping skills/relaxation discussion             Notes: Pt participated fully in task. Pt was able to explore creative expression, and engage in coping skills/relaxation discussion. Pt was pleasant and cooperative. Status After Intervention: Improved         Participation Level: Active Listener ,sharing , supportive     Participation Quality:  Attentive , sharing ,supportive     Speech:  Normal        Thought Process/Content: Logical ,linear  r/t task . Affective Functioning: Congruent, brightens. Mood: Euthymic, brightens at intervals with humor in group, social with peers. Level of consciousness:  Alert, attentive     Response to Learning: Able to express current knowledge/experience, able to verbalize/acknowledge new learning , and Progressing to goal        Endings: None Reported     Modes of Intervention: Education, Support, Socialization, Exploration, Clarifying and Problem-solving.         Discipline Responsible: Psychoeducational Specialist      Signature:  Goyo Feliz

## 2023-03-19 NOTE — GROUP NOTE
Group Therapy Note    Date: 3/19/2023    Group Start Time: 0900  Group End Time: 0940  Group Topic: Community Meeting    KAROLINA PATEL    Cristal He LPN        Group Therapy Note    Attendees: 5/23       Patient's Goal:  Finding an hobby and mending relationship with spouse.       Status After Intervention:  Improved    Participation Level: Interactive    Participation Quality: Supportive      Speech:  normal      Thought Process/Content: Logical      Affective Functioning: Flat      Mood: angry      Level of consciousness:  Oriented x4      Response to Learning: Capable of insight      Endings: None Reported    Modes of Intervention: Clarifying      Discipline Responsible: Licensed Practical Nurse      Signature:  Cristal He LPN

## 2023-03-20 VITALS
TEMPERATURE: 98.3 F | DIASTOLIC BLOOD PRESSURE: 68 MMHG | SYSTOLIC BLOOD PRESSURE: 111 MMHG | WEIGHT: 249 LBS | HEIGHT: 68 IN | BODY MASS INDEX: 37.74 KG/M2 | RESPIRATION RATE: 14 BRPM | HEART RATE: 86 BPM | OXYGEN SATURATION: 99 %

## 2023-03-20 LAB
GLUCOSE BLD-MCNC: 141 MG/DL (ref 65–105)
GLUCOSE BLD-MCNC: 61 MG/DL (ref 65–105)

## 2023-03-20 PROCEDURE — 6370000000 HC RX 637 (ALT 250 FOR IP): Performed by: NURSE PRACTITIONER

## 2023-03-20 PROCEDURE — 6370000000 HC RX 637 (ALT 250 FOR IP)

## 2023-03-20 PROCEDURE — 6360000002 HC RX W HCPCS: Performed by: INTERNAL MEDICINE

## 2023-03-20 PROCEDURE — 82947 ASSAY GLUCOSE BLOOD QUANT: CPT

## 2023-03-20 PROCEDURE — 6370000000 HC RX 637 (ALT 250 FOR IP): Performed by: INTERNAL MEDICINE

## 2023-03-20 RX ORDER — HYDROXYZINE 50 MG/1
50 TABLET, FILM COATED ORAL 3 TIMES DAILY PRN
Qty: 30 TABLET | Refills: 0 | Status: SHIPPED | OUTPATIENT
Start: 2023-03-20 | End: 2023-03-30

## 2023-03-20 RX ORDER — TRAZODONE HYDROCHLORIDE 100 MG/1
100 TABLET ORAL NIGHTLY PRN
Qty: 30 TABLET | Refills: 0 | Status: SHIPPED | OUTPATIENT
Start: 2023-03-20 | End: 2023-03-20 | Stop reason: SDUPTHER

## 2023-03-20 RX ORDER — BUMETANIDE 1 MG/1
1 TABLET ORAL DAILY
Qty: 30 TABLET | Refills: 0 | Status: SHIPPED | OUTPATIENT
Start: 2023-03-20 | End: 2023-03-20 | Stop reason: SDUPTHER

## 2023-03-20 RX ORDER — ACYCLOVIR 200 MG/1
400 CAPSULE ORAL 3 TIMES DAILY
Qty: 60 CAPSULE | Refills: 0 | Status: SHIPPED | OUTPATIENT
Start: 2023-03-20 | End: 2023-03-30

## 2023-03-20 RX ORDER — FLUOXETINE HYDROCHLORIDE 20 MG/1
40 CAPSULE ORAL DAILY
Qty: 60 CAPSULE | Refills: 0 | Status: SHIPPED | OUTPATIENT
Start: 2023-03-20

## 2023-03-20 RX ORDER — INSULIN GLARGINE 100 [IU]/ML
24 INJECTION, SOLUTION SUBCUTANEOUS DAILY
Qty: 10 ML | Refills: 0 | Status: SHIPPED | OUTPATIENT
Start: 2023-03-20 | End: 2023-03-20 | Stop reason: SDUPTHER

## 2023-03-20 RX ORDER — INSULIN GLARGINE 100 [IU]/ML
24 INJECTION, SOLUTION SUBCUTANEOUS DAILY
Qty: 10 ML | Refills: 0 | Status: SHIPPED | OUTPATIENT
Start: 2023-03-20

## 2023-03-20 RX ORDER — HYDROXYZINE 50 MG/1
50 TABLET, FILM COATED ORAL 3 TIMES DAILY PRN
Qty: 30 TABLET | Refills: 0 | Status: SHIPPED | OUTPATIENT
Start: 2023-03-20 | End: 2023-03-20 | Stop reason: SDUPTHER

## 2023-03-20 RX ORDER — BUMETANIDE 1 MG/1
1 TABLET ORAL DAILY
Qty: 30 TABLET | Refills: 0 | Status: SHIPPED | OUTPATIENT
Start: 2023-03-20

## 2023-03-20 RX ORDER — OLANZAPINE 15 MG/1
15 TABLET ORAL NIGHTLY
Qty: 30 TABLET | Refills: 0 | Status: SHIPPED | OUTPATIENT
Start: 2023-03-20 | End: 2023-03-20 | Stop reason: SDUPTHER

## 2023-03-20 RX ORDER — TRAZODONE HYDROCHLORIDE 100 MG/1
100 TABLET ORAL NIGHTLY PRN
Qty: 30 TABLET | Refills: 0 | Status: SHIPPED | OUTPATIENT
Start: 2023-03-20

## 2023-03-20 RX ORDER — BENZTROPINE MESYLATE 1 MG/1
1 TABLET ORAL 2 TIMES DAILY
Qty: 60 TABLET | Refills: 0 | Status: SHIPPED | OUTPATIENT
Start: 2023-03-20

## 2023-03-20 RX ORDER — BENZTROPINE MESYLATE 1 MG/1
1 TABLET ORAL 2 TIMES DAILY
Qty: 60 TABLET | Refills: 0 | Status: SHIPPED | OUTPATIENT
Start: 2023-03-20 | End: 2023-03-20 | Stop reason: SDUPTHER

## 2023-03-20 RX ORDER — FLUOXETINE HYDROCHLORIDE 20 MG/1
40 CAPSULE ORAL DAILY
Qty: 60 CAPSULE | Refills: 0 | Status: SHIPPED | OUTPATIENT
Start: 2023-03-20 | End: 2023-03-20 | Stop reason: SDUPTHER

## 2023-03-20 RX ORDER — ACYCLOVIR 200 MG/1
400 CAPSULE ORAL 3 TIMES DAILY
Qty: 60 CAPSULE | Refills: 0 | Status: SHIPPED | OUTPATIENT
Start: 2023-03-20 | End: 2023-03-20 | Stop reason: SDUPTHER

## 2023-03-20 RX ORDER — OLANZAPINE 15 MG/1
15 TABLET ORAL NIGHTLY
Qty: 30 TABLET | Refills: 0 | Status: SHIPPED | OUTPATIENT
Start: 2023-03-20

## 2023-03-20 RX ADMIN — INSULIN GLARGINE 24 UNITS: 100 INJECTION, SOLUTION SUBCUTANEOUS at 09:30

## 2023-03-20 RX ADMIN — DILTIAZEM HYDROCHLORIDE 360 MG: 180 CAPSULE, COATED, EXTENDED RELEASE ORAL at 09:23

## 2023-03-20 RX ADMIN — BENZTROPINE MESYLATE 1 MG: 1 TABLET ORAL at 09:21

## 2023-03-20 RX ADMIN — THIAMINE HCL TAB 100 MG 100 MG: 100 TAB at 09:22

## 2023-03-20 RX ADMIN — FONDAPARINUX SODIUM 10 MG: 10 INJECTION, SOLUTION SUBCUTANEOUS at 09:23

## 2023-03-20 RX ADMIN — ACYCLOVIR 400 MG: 200 CAPSULE ORAL at 09:23

## 2023-03-20 RX ADMIN — FLUOXETINE 40 MG: 20 CAPSULE ORAL at 09:22

## 2023-03-20 RX ADMIN — METOPROLOL SUCCINATE 25 MG: 25 TABLET, EXTENDED RELEASE ORAL at 09:21

## 2023-03-20 RX ADMIN — BUMETANIDE 1 MG: 1 TABLET ORAL at 09:23

## 2023-03-20 RX ADMIN — CYCLOBENZAPRINE 10 MG: 10 TABLET, FILM COATED ORAL at 09:22

## 2023-03-20 RX ADMIN — LORAZEPAM 0.5 MG: 0.5 TABLET ORAL at 11:34

## 2023-03-20 RX ADMIN — MULTIPLE VITAMINS W/ MINERALS TAB 1 TABLET: TAB at 09:21

## 2023-03-20 RX ADMIN — FOLIC ACID 1 MG: 1 TABLET ORAL at 09:22

## 2023-03-20 RX ADMIN — LOSARTAN POTASSIUM 50 MG: 50 TABLET, FILM COATED ORAL at 09:21

## 2023-03-20 RX ADMIN — PRAVASTATIN SODIUM 40 MG: 40 TABLET ORAL at 09:23

## 2023-03-20 ASSESSMENT — PAIN DESCRIPTION - DESCRIPTORS: DESCRIPTORS: ACHING;DISCOMFORT

## 2023-03-20 ASSESSMENT — PAIN SCALES - GENERAL: PAINLEVEL_OUTOF10: 3

## 2023-03-20 ASSESSMENT — PAIN DESCRIPTION - LOCATION: LOCATION: BACK

## 2023-03-20 ASSESSMENT — PAIN DESCRIPTION - ORIENTATION: ORIENTATION: LOWER
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
## 2023-03-20 NOTE — DISCHARGE SUMMARY
fondaparinux (ARIXTRA) 10 MG/0.8ML SOLN injection Inject 10 mg into the skin dailyHistorical Med      insulin lispro (HUMALOG) 100 UNIT/ML SOLN injection vial **Medium Dose Corrective Algorithm**  Glucose: Dose:  If <139             No Insulin  140-199 2 Units  200-249 4 Units  250-299 6 Units  300-349 8 Units  350-400 10 Units  Above 400       12 Units, Disp-1 each, R-0Normal      metoprolol succinate (TOPROL XL) 25 MG extended release tablet Take 1 tablet by mouth daily, Disp-30 tablet, [id number]      folic acid (FOLVITE) 1 MG tablet Take 1 tablet by mouth daily, Disp-30 tablet, R-3Normal      thiamine mononitrate (THIAMINE) 100 MG tablet Take 1 tablet by mouth daily, Disp-30 tablet, R-0Normal      dilTIAZem (CARDIZEM CD) 360 MG extended release capsule Take 360 mg by mouth dailyHistorical Med      losartan (COZAAR) 50 mg tablet Take 50 mg by mouth dailyHistorical Med      Syringe/Needle, Disp, (SYRINGE 3CC/25GX1\") 25G X 1\" 3 ML MISC Disp-50 each, R-3, NormalTo be used with B12 injections monthly      pravastatin (PRAVACHOL) 40 MG tablet TAKE 1 TABLET(40 MG) BY MOUTH DAILY, Disp-90 tablet, R-3Normal      acetaminophen (TYLENOL) 500 MG tablet Take 2 tablets by mouth every 8 hours as needed for Pain, Disp-20 tablet, R-0Print      glucose monitoring kit (FREESTYLE) monitoring kit Disp-1 kit, R-0, NormalTesting twice a day. Please dispense according to patients insurance. Insulin Pen Needle 31G X 5 MM MISC DAILY Starting Fri 12/20/2019, Disp-100 each, R-5, Normal      Lancets 30G MISC Disp-200 each, R-3, NormalTesting twice a day. Please dispense according to patients insurance. blood glucose monitor strips Testing twice a day.   Please dispense according to patients insurance., Disp-200 strip, R-3, Normal      Multiple Vitamins-Minerals (THERAPEUTIC MULTIVITAMIN-MINERALS) tablet Take 1 tablet by mouth dailyHistorical Med      vitamin D (CHOLECALCIFEROL) 1000 UNIT TABS tablet Take 1,000 Units by mouth

## 2023-03-20 NOTE — PROGRESS NOTES
03/15/23 1418   Encounter Summary   Encounter Overview/Reason  Spiritual/Emotional Needs   Service Provided For: Patient   Referral/Consult From: Roseline   Last Encounter  03/15/23   Complexity of Encounter Moderate   Begin Time 0130   End Time  0200   Total Time Calculated 30 min   Spiritual/Emotional needs   Type Spiritual Support   Behavioral Health    Type  [facility]   Assessment/Intervention/Outcome   Assessment Calm   Intervention Active listening;Nurtured Hope;Prayer (assurance of)/[place];Sustaining Presence/Ministry of presence   Outcome Receptive; Expressed Gratitude;Engaged in conversation
Behavioral Services                                              Medicare Re-Certification    I certify that the inpatient psychiatric hospital services furnished since the previous certification/re-certification were, and continue to be, medically necessary for;    [x] (1) Treatment which could reasonably be expected to improve the patient's condition,    [x] (2) Or for diagnostic study. Estimated length of stay/service 1-2 days    Plan for post-hospital care -outpatient care    This patient continues to need, on a daily basis, active treatment furnished directly by or requiring the supervision of inpatient psychiatric personnel.     Electronically signed by Rika Huizar MD on 3/20/2023 at 9:35 AM
Behavioral Services  Medicare Certification Upon Admission    I certify that this patient's inpatient psychiatric hospital admission is medically necessary for:    [x] (1) Treatment which could reasonably be expected to improve this patient's condition,       [x] (2) Or for diagnostic study;     AND     [x](2) The inpatient psychiatric services are provided while the individual is under the care of a physician and are included in the individualized plan of care.     Estimated length of stay/service 2-9 days    Plan for post-hospital care -outpatient care    Electronically signed by Gutierrez Carlos MD on 3/13/2023 at 9:37 AM
Haris Rod[facility]guez Daily Progress Note  3/15/2023    Patient Name: Alley Boyle    CHIEF COMPLAINT:  Acute psychosis          SUBJECTIVE:      Patient is seen today for a follow up assessment. She has been compliant with scheduled medication. Patient has remained in behavioral control and not required emergency medication last 24 hours. Staff reports the patient has been out milieu, social and engaging in group programming the milieu. Prior to approach patient has been seen sitting alone looking outside the window. Patient is accepting to follow-up assessment at sensory room. She voices to have improvement in auditory hallucinations states are now hear him in a \"chatter form\". Patient endorses having visual hallucinations where she states seeing \"animals and big block a thing while in bed\". She reports perceptual disturbances worse when alone in the room thus voices goal is to stay out of the room and engage in group programming the main milieu. Also she voices to have \"smelled  dad's teagane last night while in bed alone\". Patient reports to have had poor sleep last night since titration of Zyprexa and utilizing of as needed Ativan, Atarax and trazodone. Overall she reports slight improvement in symptoms but states concern remembering if \"what happened last week was real\". She states to be talking to her  and sister and thinks does not seem to add up. Voices goal is to achieve mental stability and continue care outpatient to a provider who will see her in person where she states not being happy seeing a telehealth provider with the [facility] currently. Although signs of improvement patient not a candidate for lower level of care due to risk for decompensation thus requiring further hospitalization for safety and stability.     Appetite:  [] Adequate/Unchanged  [x] Increased  [] Decreased      Sleep:       [x] Adequate/Unchanged  [] Fair  [] Poor      Group Attendance on Unit:   [x] Yes   []
[facility] Internal Medicine  Sadie Gordon MD; Kandyce Jeans, MD; Leola Mcqueen MD; MD Hayley Mendieta MD; MD MASON Gunn [facility] Internal Medicine   [facility]    HISTORY AND PHYSICAL EXAMINATION            Date:   3/17/2023  Patient name:  Severiano Barters  Date of admission:  3/12/2023  9:06 PM  MRN:   [id number]  Account:  [de-identified]  YOB: 1980  PCP:    Izabella Godoy  Room[facility]   [facility]  Code Status:    Full Code    Chief Complaint:     No chief complaint on file. Uncontrolled diabetes mellitus  Hypercoagulable state  History Obtained From:     Patient medical record nursing staff  History of Present Illness:     Severiano Barters is a 36 y.o. Non- / non  female who presents with No chief complaint on file. and is admitted to the hospital for the management of Bipolar 1 disorder, mixed, severe ([address] 44-year-old lady with class II obesity BMI 37.86 history of alcohol abuse history of uncontrolled diabetes mellitus poor compliance with medication history of hyper Sherran Bidding state due to antiphospholipid antibody on Arixtra shots admitted to behavioral health unit for mental health condition  HTN  Onset more than 2 years ago  dion mild to mod  Controlled with current po meds  Not associated with headaches or blurry vision  No chest pain  Diabetes   Duration more than 7 years  Modifying factors on Glucophage and other med  Severity uncontrolled sever  Associated signs and symtoms neuropathy/ckd/ CAD.    aggravated with sugar diet and better with low sugar diet         Past Medical History:     Past Medical History:   Diagnosis Date    Alcohol abuse     sober [id number]    Anxiety     Arthritis     Painting esophagus     Benzodiazepine overdose 9/26/2015    Bipolar disorder, unspecified ([address]     Bipolar I disorder, most recent episode depressed, severe without psychotic features ([address]
Jannie Hoover Daily Progress Note  3/14/2023    Patient Name: Margaret Mcginnis    CHIEF COMPLAINT:  Acute psychosis          SUBJECTIVE:      Patient is seen today for a follow up assessment. She has been compliant with scheduled medication. Patient has remained in behavioral control and not required emergency medication last 24 hours. She has been compliant with group program on the [facility]. Prior to approach patient has been seen out [facility], dressed in home clothes, pleasant and social with peers. Upon approach patient reports to have struggled with sleep last night due to \"intense auditory hallucinations\" where she reports hearing \"having bilateral music\", denies commanding type. Also she reports visual hallucinations last night when along where she reports seeing \"shadow figures\". Patient states that her goal is to stay out of her room, engage in group and keep busy in order to avoid hallucinations which she said are less severe and intense when \"keeping busy\". Patient states finding group, reading and interacting with others beneficial.  She denies thoughts of self-harm or harming others however unsure if hopeful about future at this time due to relationship issues with . She reports anxiety and states Atarax cannot be beneficial.  Writer did discuss available home med as needed 1 mg Ativan 1 times daily which she states was unaware of availability of medication. We discussed the possibility of tapering off of Ativan at this time however refuses and states to have been on 2 mg in the past and was tapered to 1 mg but reports needing medication for anxiety especially at night. Patient voices poor sleep. We discussed the possibility of titrating trazodone however states to have been taking 300 mg of trazodone in the past and was ineffective. Other medications were discussed and patient provides limited options due to prior ineffectiveness or side effects.   At this time patient is not open to titration
Nutrition Note    10:45 am  Nutrition screen received due to pt being edentulous but has dentures. Nurse states no difficulty with eating reported and regular diet consistency to continue. No RD follow-up planned unless requested. 5:35 pm  Nurse called to say that pt did have difficulty with chicken tenders being too crispy and hard to chew. Nurse provided meal replacement. Pt wishes to continue with regular texture diet but will try to select foods best tolerated from the menu. Anna Hope R.D., L.D.   Phone: [phone]
Pharmacy Med Education Group Note    Date: 3/15/23  Start Time: 1430  End Time: 1600    Number Participants in Group:  8    Goal:  Patient will demonstrate an understanding of the medications intended purpose and possible adverse effects  Topic: [place] for Pharmacy Med Ed Group    Discipline Responsible:     OT  AT  [facility].  RT     X Other       Participation Level:     None  Minimal      X Active Listener    X Interactive    Monopolizing         Participation Quality:    X Appropriate  Inappropriate     X       Attentive        Intrusive          Sharing        Resistant          Supportive        Lethargic       Affective:     X Congruent  Incongruent  Blunted  Flat    Constricted  Anxious  Elated  Angry    Labile  Depressed  Other         Cognitive:    X Alert  Oriented PPTP     Concentration   X G  F  P   Attention Span   X G  F  P   Short-Term Memory   X G  F  P   Long-Term Memory  G  F  P   ProblemSolving/  Decision Making  G  F  P   Ability to Process  Information   X G  F  P      Contributing Factors             Delusional             Hallucinating             Flight of Ideas             Other:       Modes of Intervention:    X Education   X Support  Exploration    Clarifying  Problem Solving  Confrontation    Socialization  Limit Setting  Reality Testing    Activity  Movement  Media    Other:            Response to Learning:    X Able to verbalize current knowledge/experience    Able to verbalize/acknowledge new learning    Able to retain information    Capable of insight    Able to change behavior    Progressing to goal    Other:        Comments:     Dustin Arreola, Joellen, BCPS, BCPP  3/15/2023 3:05 PM  [phone]
RT ASSESSMENT TREATMENT GOALS    [x]Pt Goal:  Pt will identify 1-2 positive coping skills by time of discharge. []Pt Goal:  Pt will identify 1-2 positive aspects of self by time of discharge. []Pt Goal:  Pt will remain on task/topic for 15-30 minutes during group by time of discharge. [x]Pt Goal:  Pt will identify 1-2 aspects of relapse prevention plan by time of discharge. []Pt Goal:  Pt will join in conversation with peers 1-2 times per group by time of discharge. []Pt Goal:  Pt will identify 1-2 new leisure interests by time of discharge. [x]Pt Goal:  Pt will not voice any delusional content by time of discharge.
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 benzodiazepine     Pernicious anemia     Primary hypercoagulable state ([address]     antiphospholipid antibodies on Arixtra shots daily    Pulmonary embolism ([address] 2010    Suicidal behavior 12/14/2015    Suicidal ideation     Suicide attempt ([address] 2014    hx OD on painkillers and rubbing alcohol    SVT (supraventricular tachycardia) ([address] 04/07/2017    Toxic effect of ethanol, intentional self-harm ([address] 12/16/2015    Type 2 diabetes mellitus, with long-term current use of insulin [facility])         Past Surgical History:     Past Surgical History:   Procedure Laterality Date    ABDOMINAL HERNIA REPAIR  2014    wound vac    ABDOMINAL HERNIA REPAIR  11/3/14    BARIATRIC SURGERY  2013    [address]    CHOLECYSTECTOMY      DILATION AND CURETTAGE OF UTERUS  2005    ENDOSCOPY, COLON, DIAGNOSTIC      EGD    FINGER AMPUTATION Left 02/09/2015    index and ring finger. from frostbite    HERNIA REPAIR      total of 8    IVC FILTER INSERTION      LIVER RESECTION      for hepatic adenoma    LYMPH NODE BIOPSY  1990    JUSTINE AND BSO (CERVIX REMOVED)  2011    TONSILLECTOMY          Medications Prior to Admission:     Prior to Admission medications    Medication Sig Start Date End Date Taking?
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 benzodiazepine     Pernicious anemia     Primary hypercoagulable state ([address]     antiphospholipid antibodies on Arixtra shots daily    Pulmonary embolism ([address] 2010    Suicidal behavior 12/14/2015    Suicidal ideation     Suicide attempt ([address] 2014    hx OD on painkillers and rubbing alcohol    SVT (supraventricular tachycardia) ([address] 04/07/2017    Toxic effect of ethanol, intentional self-harm ([address] 12/16/2015    Type 2 diabetes mellitus, with long-term current use of insulin [facility])         Past Surgical History:     Past Surgical History:   Procedure Laterality Date    ABDOMINAL HERNIA REPAIR  2014    wound vac    ABDOMINAL HERNIA REPAIR  11/3/14    BARIATRIC SURGERY  2013    [address] [facility]    CHOLECYSTECTOMY      DILATION AND CURETTAGE OF UTERUS  2005    ENDOSCOPY, COLON, DIAGNOSTIC      EGD    FINGER AMPUTATION Left 02/09/2015    index and ring finger. from frostbite    HERNIA REPAIR      total of 8    IVC FILTER INSERTION      LIVER RESECTION      for hepatic adenoma    LYMPH NODE BIOPSY  1990    JUSTINE AND BSO (CERVIX REMOVED)  2011    TONSILLECTOMY          Medications Prior to Admission:     Prior to Admission medications    Medication Sig Start Date End Date Taking?
community. Appetite:  [x] Adequate/Unchanged  [] Increased  [] Decreased      Sleep:       [x] Adequate/Unchanged  [] Fair  [] Poor      Group Attendance on Unit:   [x] Yes   [] Selectively    [] No    Compliant with scheduled medications: [x] Yes  [] No    Received emergency medications in past 24 hrs: [] Yes   [x] No    Medication Side Effects: Denies         Mental Status Exam  Level of consciousness: Alert and awake   Appearance: Appropriate attire for setting, seated in chair, with good  grooming and hygiene   Behavior/Motor: Approachable, engages with interviewer, no psychomotor abnormalities   Attitude toward examiner: Cooperative, attentive, good eye contact  Speech: spontaneous, normal rate, normal volume, and well articulated   Mood: \"Better\"  Affect: Congruent, less flat  Thought processes: linear and coherent   Thought content: Denies homicidal ideation  Suicidal Ideation: Denies suicidal ideations, contracts for safety on the unit. Delusions: No evidence of delusions. Endorses improvement in paranoia  Perceptual Disturbance: Patient does not appear to be responding to internal stimuli. Endorses improvement auditory and visual hallucinations, less intense and severe  Cognition: Oriented to self, location, time, and situation  Memory: intact  Insight: poor   Judgement: poor       Data   height is 5' 8\" (1.727 m) and weight is 249 lb (112.9 kg). Her oral temperature is 97.3 °F (36.3 °C). Her blood pressure is 122/65 and her pulse is 122 (abnormal). Her respiration is 14 and oxygen saturation is 99%.    Labs:   Admission on 03/12/2023   Component Date Value Ref Range Status    POC Glucose 03/13/2023 117 (A)  65 - 105 mg/dL Final    POC Glucose 03/13/2023 267 (A)  65 - 105 mg/dL Final    POC Glucose 03/13/2023 201 (A)  65 - 105 mg/dL Final    POC Glucose 03/13/2023 226 (A)  65 - 105 mg/dL Final    POC Glucose 03/14/2023 93  65 - 105 mg/dL Final    POC Glucose 03/14/2023 254 (A)  65 - 105 mg/dL Final
Critical Noted    POC Glucose 03/17/2023 155 (A)  65 - 105 mg/dL Final    POC Glucose 03/17/2023 274 (A)  65 - 105 mg/dL Final    POC Glucose 03/17/2023 280 (A)  65 - 105 mg/dL Final    POC Glucose 03/17/2023 194 (A)  65 - 105 mg/dL Final         Reviewed patient's current plan of care and vital signs with nursing staff.     Labs reviewed: [x] Yes  Last EKG in EMR reviewed: [x] Yes  11/17/2022 QTc 445    Medications  Current Facility-Administered Medications: bumetanide (BUMEX) tablet 1 mg, 1 mg, Oral, Daily  OLANZapine (ZYPREXA) tablet 15 mg, 15 mg, Oral, Nightly  nystatin (MYCOSTATIN) ointment, , Topical, BID  cyclobenzaprine (FLEXERIL) tablet 10 mg, 10 mg, Oral, TID PRN  fondaparinux (ARIXTRA) injection 10 mg, 10 mg, SubCUTAneous, Daily  LORazepam (ATIVAN) tablet 1 mg, 1 mg, Oral, Daily PRN  insulin glargine (LANTUS) injection vial 24 Units, 24 Units, SubCUTAneous, Daily  haloperidol lactate (HALDOL) injection 5 mg, 5 mg, IntraMUSCular, Q6H PRN **AND** LORazepam (ATIVAN) injection 2 mg, 2 mg, IntraMUSCular, Q6H PRN **AND** diphenhydrAMINE (BENADRYL) injection 50 mg, 50 mg, IntraMUSCular, Q6H PRN  haloperidol (HALDOL) tablet 5 mg, 5 mg, Oral, Q6H PRN **AND** LORazepam (ATIVAN) tablet 2 mg, 2 mg, Oral, Q6H PRN  acetaminophen (TYLENOL) tablet 650 mg, 650 mg, Oral, Q6H PRN  ibuprofen (ADVIL;MOTRIN) tablet 400 mg, 400 mg, Oral, Q6H PRN  hydrOXYzine HCl (ATARAX) tablet 50 mg, 50 mg, Oral, TID PRN  traZODone (DESYREL) tablet 50 mg, 50 mg, Oral, Nightly PRN  polyethylene glycol (GLYCOLAX) packet 17 g, 17 g, Oral, Daily PRN  aluminum & magnesium hydroxide-simethicone (MAALOX) 200-200-20 MG/5ML suspension 30 mL, 30 mL, Oral, Q6H PRN  nicotine polacrilex (NICORETTE) gum 2 mg, 2 mg, Oral, Q2H PRN  acetaminophen (TYLENOL) tablet 650 mg, 650 mg, Oral, Q4H PRN  acyclovir (ZOVIRAX) capsule 400 mg, 400 mg, Oral, TID  dilTIAZem (CARDIZEM CD) extended release capsule 360 mg, 360 mg, Oral, Daily  FLUoxetine (PROZAC) capsule 40 mg, 40
(FOLVITE) tablet 1 mg, 1 mg, Oral, Daily  insulin lispro (HUMALOG) injection vial 0-4 Units, 0-4 Units, SubCUTAneous, Nightly  insulin lispro (HUMALOG) injection vial 0-8 Units, 0-8 Units, SubCUTAneous, TID WC  losartan (COZAAR) tablet 50 mg, 50 mg, Oral, Daily  magnesium hydroxide (MILK OF MAGNESIA) 400 MG/5ML suspension 30 mL, 30 mL, Oral, Daily PRN  metoprolol succinate (TOPROL XL) extended release tablet 25 mg, 25 mg, Oral, Daily  pravastatin (PRAVACHOL) tablet 40 mg, 40 mg, Oral, Daily  therapeutic multivitamin-minerals 1 tablet, 1 tablet, Oral, Daily  thiamine mononitrate tablet 100 mg, 100 mg, Oral, Daily    ASSESSMENT  Bipolar 1 disorder, mixed, severe ([facility])         PLAN  Patient symptoms are: Improving   Medications Changed Today. A discussion of risks, benefits, and alternatives was held with the patient and this provider with regards to medication changes. After this discussion we mutually agreed to proceed with the medication changes. Start Cogentin 1 mg by mouth 2 times a day  Continue Ativan taper  Monitor need and frequency of PRN medications. Encourage participation in groups and milieu. Attempt to develop insight. Psycho-education conducted. Probable discharge is Monday  Follow-up daily while inpatient. Patient okay to have shoes with no laces on the unit    During assessment today > 16 minutes was spent in supportive psychotherapy. Patient continues to be monitored in the inpatient psychiatric facility at [facility] for safety and stabilization. Patient continues to need, on a daily basis, active treatment furnished directly by or requiring the supervision of inpatient psychiatric personnel. Electronically signed by BERNARDA Martinez CNP on 3/19/2023 at 7:35 PM    **This report has been created using voice recognition software. It may contain minor errors which are inherent in voice recognition technology. **
graeme on the unit    During assessment today > 16 minutes was spent in supportive psychotherapy. Patient continues to be monitored in the inpatient psychiatric facility at [facility] for safety and stabilization. Patient continues to need, on a daily basis, active treatment furnished directly by or requiring the supervision of inpatient psychiatric personnel. Electronically signed by BERNARDA Hassan CNP on 3/18/2023 at 5:00 PM    **This report has been created using voice recognition software. It may contain minor errors which are inherent in voice recognition technology. **
gallop, rub  Abdomen: Soft, nontender, nondistended, normal bowel sounds, no hepatomegaly or splenomegaly  Neurologic: There are no new focal motor or sensory deficits, normal muscle tone and bulk, no abnormal sensation, normal speech, cranial nerves II through XII grossly intact  Skin: No gross lesions, rashes, bruising or bleeding on exposed skin area  Extremities: peripheral pulses palpable, no pedal edema or calf pain with palpation      Investigations:      Laboratory Testing:  Recent Results (from the past 24 hour(s))   POC Glucose Fingerstick    Collection Time: 03/14/23  5:10 PM   Result Value Ref Range    POC Glucose 228 (H) 65 - 105 mg/dL   POC Glucose Fingerstick    Collection Time: 03/14/23  7:44 PM   Result Value Ref Range    POC Glucose 134 (H) 65 - 105 mg/dL   POC Glucose Fingerstick    Collection Time: 03/15/23  7:35 AM   Result Value Ref Range    POC Glucose 119 (H) 65 - 105 mg/dL   POC Glucose Fingerstick    Collection Time: 03/15/23 11:42 AM   Result Value Ref Range    POC Glucose 245 (H) 65 - 105 mg/dL       Imaging/Diagnostics:  No results found. Assessment :      Hospital Problems             Last Modified POA    * (Principal) Bipolar 1 disorder, mixed, severe ([facility] 3/12/2023 Yes     Plan:     23-year-old lady with a history of alcohol abuse poor compliance  History of uncontrolled diabetes mellitus start Jardiance 10 mg daily  Lantus 30 units daily  Diet exercise and weight loss advised  Alcohol abuse folic acid 1 mg thiamine 100 mg  History of hypercoagulability due to antiphospholipid antibody syndrome Arixtra 10 mg subcu daily  Hypertension Cardizem CD daily 360 mg  Hyperlipidemia pravastatin 40 mg daily  Intertrigo miconazole 2% powder topically              Wilfred Holm MD  3/15/2023  4:02 PM    Copy sent to Dr. Andrews Player    Please note that this chart was generated using voice recognition Dragon dictation software.   Although every effort was made to ensure the accuracy of

## 2023-03-20 NOTE — DISCHARGE INSTRUCTIONS
Information:  Medications:   Medication summary provided   I understand that I should take only the medications on my list.     -why and when I need to take each medicine.     -which side effects to watch for.     -that I should carry my medication information at all times in case of     Emergency situations. I will take all of my medicines to follow up appointments.     -check with my physician or pharmacist before taking any new    Medication, over the counter product or drink alcohol.    -Ask about food, drug or dietary supplement interactions.    -discard old lists and update records with medication providers. Notify Physician:  Notify physician if you notice:   Always call 911 if you feel your life is in danger  In case of an emergency call 911 immediately! If 911 is not available call your local emergency medical system for help    Behavioral Health Follow Up:  Original Referral Source:[facility] medical  Discharge Diagnosis: Bipolar 1 disorder, mixed, severe ([facility] [address] [F31.63]  Recommendations for Level of Care: compliant with medications and follow up appointments  Patient status at discharge: Alert and oriented denies any thoughts of self harm   My hospital  was:  [facility]  Aftercare plan faxed: [place] [facility]   -faxed by: staff   -date: 03/20   -time: 1300  Prescriptions: sent to [address] [place] [address] and Flu Symptoms  How can you tell COVID-19 from the flu? COVID-19 and the flu have similar symptoms. The two can be hard to tell apart. The only way to know for sure which illness you have is to be tested. If you have questions about COVID-19 testing, ask your doctor or go to cdc.gov to use the COVID-19 Viral Testing Tool. Since the symptoms are so alike, it makes sense to act as if you have COVID-19 until your test results come back. This means staying home and limiting contact with people in your home.  You'll need to wash your hands often and disinfect surfaces

## 2023-03-20 NOTE — GROUP NOTE
Group Therapy Note    Date: 3/20/2023    Group Start Time: 0915  Group End Time: 2790  Group Topic: Community Meeting    KAROLINA PATEL    Vanessa Holloway LPN        Group Therapy Note    Attendees: 10/23       Patient's Goal:  Community Meeting    Notes:  educate    Status After Intervention:  Improved    Participation Level: Minimal    Participation Quality: Attentive      Speech:  normal      Thought Process/Content: Logical      Affective Functioning: Flat      Mood: anxious      Level of consciousness:  Alert      Response to Learning: Progressing to goal      Endings: None Reported    Modes of Intervention: Education      Discipline Responsible: Licensed Practical Nurse      Signature:  Vanessa Holloway LPN

## 2023-03-20 NOTE — PLAN OF CARE
[address] [place] [address]  Day 3 Interdisciplinary Treatment Plan NOTE    Review Date & Time: 3/15/2023   1312    Admission Type:   Admission Type: Voluntary    Reason for admission:  Reason for Admission: Experiencing auditory and visual hallucinations and delusions  Estimated Length of Stay: 5-7 days  Estimated Discharge Date Update: to be determined by physician    PATIENT STRENGTHS:  Patient Strengths    Patient Strengths and Limitations:Limitations: Multiple barriers to leisure interests, External locus of control, Tendency to isolate self, Difficulty problem solving/relies on others to help solve problems, Inappropriate/potentially harmful leisure interests, Demonstrates discomfort with /lack of social skills  Addictive Behavior:Addictive Behavior  In the Past 3 Months, Have You Felt or Has Someone Told You That You Have a Problem With  : None  Medical Problems:  Past Medical History:   Diagnosis Date    Alcohol abuse     sober [id number]    Anxiety     Arthritis     Painting esophagus     Benzodiazepine overdose 9/26/2015    Bipolar disorder, unspecified ([address]     Bipolar I disorder, most recent episode depressed, severe without psychotic features ([address]     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
Problem: Chronic Conditions and Co-morbidities  Goal: Patient's chronic conditions and co-morbidity symptoms are monitored and maintained or improved  3/13/2023 1350 by Diego Keating LPN  Outcome: Progressing   Patient medication complaint. Maintaining a diabetic diet. Isolative in room. Q15 minute checks maintained.     Problem: Safety - Adult  Goal: Free from fall injury  3/13/2023 1350 by Diego Keating LPN  Outcome: Progressing  3/13/2023 1302 by Dereje Rao RN  Outcome: Progressing
Problem: Chronic Conditions and Co-morbidities  Goal: Patient's chronic conditions and co-morbidity symptoms are monitored and maintained or improved  3/15/2023 2249 by Ana Cristina English RN  Outcome: Progressing  Patient evening blood sugar was 228. Problem: ABCDS Injury Assessment  Goal: Absence of physical injury  3/15/2023 2249 by Ana Cristina English RN  Outcome: Progressing  Patient is not a fall risk. Problem: Psychosis  Goal: Will report no hallucinations or delusions  Description: INTERVENTIONS:  1. Administer medication as  ordered  2. Assist with reality testing to support increasing orientation  3. Assess if patient's hallucinations or delusions are encouraging self harm or harm to others and intervene as appropriate  3/15/2023 2249 by Ana Cristina English RN  Outcome: Progressing  Patient reports auditory hallucinations of 'chatter' that bother her mostly at night and are improving. She also sees shadows on occasion. She is social with select peers and cooperative with staff. She is worried that the hallucinations haven't resolved yet. Safety plan reviewed with patient, agrees to approach staff when feeling upset. 15 minute and random checks maintained for safety. No violent or escalating behaviors noted during this shift. Patient is currently calm, controlled and medication-compliant. Patient denies suicidal ideation, andhomicidal ideation at this time.
Problem: Psychosis  Goal: Will report no hallucinations or delusions  Description: INTERVENTIONS:  1. Administer medication as  ordered  2. Assist with reality testing to support increasing orientation  3. Assess if patient's hallucinations or delusions are encouraging self harm or harm to others and intervene as appropriate  3/17/2023 1429 by Lord Kassidy RN  Outcome: Progressing     Patient is open to 1:1 talk time with staff. Patient reports experiencing anxiety at a level of three out of ten. Patient reports having auditory hallucinations of \"chatter like when you walk through [facility]. \" Patient denies depression, suicidal ideations, homicidal ideations, and visual hallucinations. Patient is out and social with peers in the day area and attends groups. Patient eats her meals and is medication compliant.
Problem: Psychosis  Goal: Will report no hallucinations or delusions  Description: INTERVENTIONS:  1. Administer medication as  ordered  2. Assist with reality testing to support increasing orientation  3. Assess if patient's hallucinations or delusions are encouraging self harm or harm to others and intervene as appropriate  3/20/2023 1018 by Ellie Dasilva LPN  Outcome: Progressing   Mrs. Nahid Suárez is seen in the dayroom affect is brightened, she reports mumbles but feels ready for discharge denies any thoughts to harm self
Problem: Psychosis  Goal: Will report no hallucinations or delusions  Description: INTERVENTIONS:  1. Administer medication as  ordered  2. Assist with reality testing to support increasing orientation  3. Assess if patient's hallucinations or delusions are encouraging self harm or harm to others and intervene as appropriate  Outcome: Progressing   Patient is out in the day area this evening, well groomed and accepting of talk time with staff. Patient denies thoughts to harm self. Reports concern about sleep due to previous intensity of chatter throughout the night. Patient reports that she slept well the night before and is hopeful for another night of restorative and restful sleep. Patient medicated with prn medication for sleep anxiety and pain at her request. She remains calm and cooperative this evening.
Problem: Safety - Adult  Goal: Free from fall injury  3/13/2023 2347 by Juan Acosta LPN  Note: Patient was free from injury and reports she will seek out nursing staff if she feels she needs help with anything. Patient was safe in all transfers and showered this evening and was safe during all transport of said activity. Patient was educated on proper transition and transport from one area to another and sitting and standing. Education was accepted. Q15min safety checks. Problem: Psychosis  Goal: Will report no hallucinations or delusions  Description: INTERVENTIONS:  1. Administer medication as  ordered  2. Assist with reality testing to support increasing orientation  3. Assess if patient's hallucinations or delusions are encouraging self harm or harm to others and intervene as appropriate  Note: Patient was free of hallucinations and delusions on evening shift tonight. Patient reported she would seek out nursing staff if anything changes. Patient accepted education on coping and relaxation methods and did report feeling more relaxed after taking a shower this evening. Patient accepted PRN atarax and was effective. Q15min safety checks.
Problem: Safety - Adult  Goal: Free from fall injury  3/14/2023 1325 by Kelley Ambriz RN  Outcome: Progressing  Note: Patient remains free from falls/injury this shift. Patient educated on stay with me program. Patient ambulates with steady gait and wears non skid footwear. Will monitor. Problem: Chronic Conditions and Co-morbidities  Goal: Patient's chronic conditions and co-morbidity symptoms are monitored and maintained or improved  Outcome: Progressing     Problem: ABCDS Injury Assessment  Goal: Absence of physical injury  Outcome: Progressing     Problem: Psychosis  Goal: Will report no hallucinations or delusions  Description: INTERVENTIONS:  1. Administer medication as  ordered  2. Assist with reality testing to support increasing orientation  3. Assess if patient's hallucinations or delusions are encouraging self harm or harm to others and intervene as appropriate  3/14/2023 1325 by Kelley Ambriz RN  Outcome: Progressing  Note: Patient presents this shift with flat affect, maintains good eye contact. Patient is pleasant and engages in 1:1. Patient reports mood is \" better \" today when compared with when admitted. Patient denies suicidal/homicidal/self harm ideations. Patient endorses auditory hallucinations of hearing voices and death metal music. Patient verbalizes that this is distressing and hopes the medication will eliminate them. Support and encouragement provided. Patient is tending to hygiene/shower and oral care. Patient reports appetite is good and sleep this past night was fair. Patient is out of room for long intervals and social with select peers. Continued support provided. Patient is compliant with medications and in behavioral control. Safety maintained with every 15 minute checks and as needed.
Problem: Safety - Adult  Goal: Free from fall injury  3/14/2023 2248 by Giuseppe Matos RN  Outcome: Progressing  Flowsheets (Taken 3/14/2023 2248)  Free From Fall Injury: Instruct family/caregiver on patient safety  Note: Patient remains free from falls and physical injury at this time. Problem: Psychosis  Goal: Will report no hallucinations or delusions  Description: INTERVENTIONS:  1. Administer medication as  ordered  2. Assist with reality testing to support increasing orientation  3. Assess if patient's hallucinations or delusions are encouraging self harm or harm to others and intervene as appropriate  3/14/2023 2248 by Giuseppe Matos RN  Outcome: Progressing  Note: Patient reports experiencing auditory hallucinations of loud noise and death metal music. Patient reports the hallucinations are worse when she is alone in her room where it is quiet.
Problem: Safety - Adult  Goal: Free from fall injury  3/15/2023 1053 by Chicho Olmstead RN  Outcome: Progressing  Note: Ms. Michelle Pizano has remained free of falls thus far during this shift. Problem: Chronic Conditions and Co-morbidities  Goal: Patient's chronic conditions and co-morbidity symptoms are monitored and maintained or improved  Outcome: Progressing  Note: Ms. Michelle Pizano follows her 4 carb count diet and adheres to her medication as prescribed. Problem: Psychosis  Goal: Will report no hallucinations or delusions  Description: INTERVENTIONS:  1. Administer medication as  ordered  2. Assist with reality testing to support increasing orientation  3. Assess if patient's hallucinations or delusions are encouraging self harm or harm to others and intervene as appropriate  3/15/2023 1053 by Chicho Olmstead RN  Outcome: Progressing  Note: Ms. Michelle Pizano reports she continues to experience auditory hallucinations, and describes them as indiscernible chatter that is constant. She reports they have decreased in intensity from yesterday, but are still there. Ms. Michelle Pizano denies visual hallucinations. She reports sleeping well last night, \"better than I have been\". She attends therapeutic groups and remains out in the milieu and socializes with her peers. She contracts for safety and denies suicidal ideation and thoughts of harm to self and others.
Problem: Safety - Adult  Goal: Free from fall injury  3/15/2023 1948 by Mg Martin  Outcome: Progressing   Patient provided safe environment within [facility] milieu. Will continue to monitor and provide q15 min safety checks.     Problem: ABCDS Injury Assessment  Goal: Absence of physical injury  3/15/2023 1948 by Mg Martin  Outcome: Progressing     Problem: ABCDS Injury Assessment
Problem: Safety - Adult  Goal: Free from fall injury  3/18/2023 0916 by Bhakti Justin RN  Outcome: Progressing  3/18/2023 0600 by Byron Sims RN  Outcome: Progressing   Patient is free from falls and injury at this time. Problem: ABCDS Injury Assessment  Goal: Absence of physical injury  Outcome: Progressing   Patient is free from in jury at this time. Problem: Psychosis  Goal: Will report no hallucinations or delusions  Description: INTERVENTIONS:  1. Administer medication as  ordered  2. Assist with reality testing to support increasing orientation  3. Assess if patient's hallucinations or delusions are encouraging self harm or harm to others and intervene as appropriate  Outcome: Progressing   Patient states she hears voices but can't make out what they are saying. Problem: Pain  Goal: Verbalizes/displays adequate comfort level or baseline comfort level  Outcome: Progressing   Patient denies pain at this time.
Problem: Safety - Adult  Goal: Free from fall injury  3/18/2023 2116 by Guerline Rodriguez LPN  Note: Patient is not a fall risk at this time. Patient is up and mobile throughout the day with no issues. Patient educated that if anything changes to seek help from staff.
Problem: Safety - Adult  Goal: Free from fall injury  3/19/2023 0919 by Peri Rodriguez RN  Outcome: Progressing   Patient is free from fall and injury at this time. Problem: ABCDS Injury Assessment  Goal: Absence of physical injury  Outcome: Progressing   Patient is free from physical injury at this time. Problem: Chronic Conditions and Co-morbidities  Goal: Patient's chronic conditions and co-morbidity symptoms are monitored and maintained or improved  Outcome: Progressing   Patient's chronic conditions are being monitored and maintained at this time.
Problem: Safety - Adult  Goal: Free from fall injury  Outcome: Progressing   Patient remains free from falls this shift, fall risk interventions in place. She is calm and cooperative accepting of medication and talk time. She is accepting of prn medication for pain and anxiety. Able maintain appropriate behavior.
Problem: Safety - Adult  Goal: Free from fall injury  Outcome: Progressing  Patient remained free from fall injury. Patient provided/worn nonslip socks. Patient agrees to seek out staff if assistance is needed ambulating. Problem: ABCDS Injury Assessment  Goal: Absence of physical injury  Outcome: Progressing  Patient remained free from physical injury. Problem: Psychosis  Goal: Will report no hallucinations or delusions  Description: INTERVENTIONS:  1. Administer medication as  ordered  2. Assist with reality testing to support increasing orientation  3. Assess if patient's hallucinations or delusions are encouraging self harm or harm to others and intervene as appropriate  Patient acknowledges current auditory hallucinations of voices chattering and visual hallucinations of shadows.
Problem: Safety - Adult  Goal: Free from fall injury  Outcome: Progressing  Patient remains free from falls, wears non-skid footwear and gait is steady. Safe environment maintained at this time. Safety checks continue every 15 minutes. Problem: Psychosis  Goal: Will report no hallucinations or delusions  Description: INTERVENTIONS:  1. Administer medication as  ordered  2. Assist with reality testing to support increasing orientation  3. Assess if patient's hallucinations or delusions are encouraging self harm or harm to others and intervene as appropriate  Outcome: Progressing  Patient is brightened, calm, cooperative and medication compliant at this time. Patient reports Improvement in auditory hallucinations and states she hears \"chattering\" when in a quiet environment. Patient denies having any delusions at this time.
assessments and care, continue to monitor pt on unit      SHORT-TERM GOALS:   Time frame for Short-Term Goals: 5-7 days    LONG-TERM GOALS:  Time frame for Long-Term Goals: 6 months  Members Present in Team Meeting: See Signature Sheet    Betsy Leung RN

## 2023-03-20 NOTE — GROUP NOTE
Group Therapy Note    Date: 3/20/2023    Group Start Time: 1050  Group End Time: 1130  Group Topic: Cognitive Skills    LUNA Espinal        Group Therapy Note    Attendees: 3/23       Patient's Goal:  pt will demonstrate improved social skills and improved concentration     Notes:   pt was pleasant and participated well     Status After Intervention:  Improved    Participation Level:  Active Listener and Interactive    Participation Quality: Appropriate, Attentive, and Supportive      Speech:  normal      Thought Process/Content: Logical      Affective Functioning: Congruent      Mood: euthymic      Level of consciousness:  Alert      Response to Learning: Able to verbalize current knowledge/experience and Progressing to goal      Endings: None Reported    Modes of Intervention: Education, Socialization, and Problem-solving      Discipline Responsible: Psychoeducational Specialist      Signature:  Kenny Johnson

## 2023-04-11 ENCOUNTER — APPOINTMENT (OUTPATIENT)
Dept: GENERAL RADIOLOGY | Age: 43
DRG: 308 | End: 2023-04-11
Payer: MEDICARE

## 2023-04-11 ENCOUNTER — HOSPITAL ENCOUNTER (INPATIENT)
Age: 43
LOS: 3 days | Discharge: HOME | DRG: 308 | End: 2023-04-14
Attending: EMERGENCY MEDICINE | Admitting: INTERNAL MEDICINE
Payer: MEDICARE

## 2023-04-11 DIAGNOSIS — E13.10 DIABETIC KETOACIDOSIS WITHOUT COMA ASSOCIATED WITH OTHER SPECIFIED DIABETES MELLITUS (HCC): Primary | ICD-10-CM

## 2023-04-11 DIAGNOSIS — I47.1 PAROXYSMAL SUPRAVENTRICULAR TACHYCARDIA (HCC): ICD-10-CM

## 2023-04-11 PROBLEM — E11.10 DKA, TYPE 2, NOT AT GOAL (HCC): Status: ACTIVE | Noted: 2023-04-11

## 2023-04-11 LAB
ABSOLUTE EOS #: <0.03 K/UL (ref 0–0.44)
ABSOLUTE IMMATURE GRANULOCYTE: <0.03 K/UL (ref 0–0.3)
ABSOLUTE LYMPH #: 0.92 K/UL (ref 1.1–3.7)
ABSOLUTE MONO #: 0.32 K/UL (ref 0.1–1.2)
ALBUMIN SERPL-MCNC: 3 G/DL (ref 3.5–5.2)
ALBUMIN/GLOBULIN RATIO: 0.9 (ref 1–2.5)
ALP SERPL-CCNC: 239 U/L (ref 35–104)
ALT SERPL-CCNC: 49 U/L (ref 5–33)
ANION GAP SERPL CALCULATED.3IONS-SCNC: 21 MMOL/L (ref 9–17)
ANION GAP: 23 MMOL/L (ref 7–16)
AST SERPL-CCNC: 55 U/L
BASOPHILS # BLD: 1 % (ref 0–2)
BASOPHILS ABSOLUTE: 0.07 K/UL (ref 0–0.2)
BETA-HYDROXYBUTYRATE: 0.28 MMOL/L (ref 0.02–0.27)
BILIRUB DIRECT SERPL-MCNC: 0.2 MG/DL
BILIRUB INDIRECT SERPL-MCNC: 0.2 MG/DL (ref 0–1)
BILIRUB SERPL-MCNC: 0.4 MG/DL (ref 0.3–1.2)
BUN SERPL-MCNC: 11 MG/DL (ref 6–20)
CALCIUM SERPL-MCNC: 8 MG/DL (ref 8.6–10.4)
CHLORIDE SERPL-SCNC: 99 MMOL/L (ref 98–107)
CO2 SERPL-SCNC: 15 MMOL/L (ref 20–31)
CREAT SERPL-MCNC: 1.25 MG/DL (ref 0.5–0.9)
EGFR, POC: 50 ML/MIN/1.73M2
EOSINOPHILS RELATIVE PERCENT: 0 % (ref 1–4)
GFR SERPL CREATININE-BSD FRML MDRD: 55 ML/MIN/1.73M2
GLUCOSE BLD-MCNC: 328 MG/DL
GLUCOSE BLD-MCNC: 328 MG/DL (ref 65–105)
GLUCOSE BLD-MCNC: 500 MG/DL (ref 74–100)
GLUCOSE SERPL-MCNC: 562 MG/DL (ref 70–99)
HCO3 VENOUS: 16.6 MMOL/L (ref 22–29)
HCT VFR BLD AUTO: 43.6 % (ref 36.3–47.1)
HGB BLD-MCNC: 14 G/DL (ref 11.9–15.1)
IMMATURE GRANULOCYTES: 0 %
INR PPP: 1
LACTIC ACID, SEPSIS WHOLE BLOOD: 6.4 MMOL/L (ref 0.5–1.9)
LACTIC ACID, WHOLE BLOOD: 8.1 MMOL/L (ref 0.7–2.1)
LYMPHOCYTES # BLD: 13 % (ref 24–43)
MAGNESIUM SERPL-MCNC: 1.8 MG/DL (ref 1.6–2.6)
MCH RBC QN AUTO: 31.4 PG (ref 25.2–33.5)
MCHC RBC AUTO-ENTMCNC: 32.1 G/DL (ref 28.4–34.8)
MCV RBC AUTO: 97.8 FL (ref 82.6–102.9)
MONOCYTES # BLD: 5 % (ref 3–12)
NEGATIVE BASE EXCESS, VEN: 7 (ref 0–2)
NRBC AUTOMATED: 0 PER 100 WBC
O2 SAT, VEN: 57 % (ref 60–85)
PARTIAL THROMBOPLASTIN TIME: 28.6 SEC (ref 23–36.5)
PCO2, VEN: 29.2 MM HG (ref 41–51)
PDW BLD-RTO: 16.8 % (ref 11.8–14.4)
PH VENOUS: 7.36 (ref 7.32–7.43)
PLATELET # BLD AUTO: 191 K/UL (ref 138–453)
PMV BLD AUTO: 12.7 FL (ref 8.1–13.5)
PO2, VEN: 30.2 MM HG (ref 30–50)
POC BUN: 10 MG/DL (ref 8–26)
POC CHLORIDE: 99 MMOL/L (ref 98–107)
POC CREATININE: 1.36 MG/DL (ref 0.51–1.19)
POC HEMATOCRIT: 48 % (ref 36–46)
POC HEMOGLOBIN: 16.3 G/DL (ref 12–16)
POC IONIZED CALCIUM: 1.06 MMOL/L (ref 1.15–1.33)
POC LACTIC ACID: 11 MMOL/L (ref 0.56–1.39)
POC POTASSIUM: 4.1 MMOL/L (ref 3.5–4.5)
POC SODIUM: 137 MMOL/L (ref 138–146)
POC TCO2: 16 MMOL/L (ref 22–30)
POTASSIUM SERPL-SCNC: 4.2 MMOL/L (ref 3.7–5.3)
PROT SERPL-MCNC: 6.4 G/DL (ref 6.4–8.3)
PROTHROMBIN TIME: 13.4 SEC (ref 11.7–14.9)
RBC # BLD: 4.46 M/UL (ref 3.95–5.11)
RBC # BLD: ABNORMAL 10*6/UL
SEG NEUTROPHILS: 81 % (ref 36–65)
SEGMENTED NEUTROPHILS ABSOLUTE COUNT: 5.59 K/UL (ref 1.5–8.1)
SODIUM SERPL-SCNC: 135 MMOL/L (ref 135–144)
T3FREE SERPL-MCNC: 3.07 PG/ML (ref 2.02–4.43)
T4 FREE SERPL-MCNC: 1.04 NG/DL (ref 0.93–1.7)
TROPONIN I SERPL DL<=0.01 NG/ML-MCNC: 27 NG/L (ref 0–14)
TROPONIN I SERPL DL<=0.01 NG/ML-MCNC: 28 NG/L (ref 0–14)
TSH SERPL-ACNC: 5.66 UIU/ML (ref 0.3–5)
WBC # BLD AUTO: 6.9 K/UL (ref 3.5–11.3)

## 2023-04-11 PROCEDURE — 80076 HEPATIC FUNCTION PANEL: CPT

## 2023-04-11 PROCEDURE — 2580000003 HC RX 258: Performed by: STUDENT IN AN ORGANIZED HEALTH CARE EDUCATION/TRAINING PROGRAM

## 2023-04-11 PROCEDURE — 93005 ELECTROCARDIOGRAM TRACING: CPT | Performed by: STUDENT IN AN ORGANIZED HEALTH CARE EDUCATION/TRAINING PROGRAM

## 2023-04-11 PROCEDURE — 93005 ELECTROCARDIOGRAM TRACING: CPT | Performed by: INTERNAL MEDICINE

## 2023-04-11 PROCEDURE — 82803 BLOOD GASES ANY COMBINATION: CPT

## 2023-04-11 PROCEDURE — 84481 FREE ASSAY (FT-3): CPT

## 2023-04-11 PROCEDURE — 80051 ELECTROLYTE PANEL: CPT

## 2023-04-11 PROCEDURE — 6360000002 HC RX W HCPCS: Performed by: STUDENT IN AN ORGANIZED HEALTH CARE EDUCATION/TRAINING PROGRAM

## 2023-04-11 PROCEDURE — 85025 COMPLETE CBC W/AUTO DIFF WBC: CPT

## 2023-04-11 PROCEDURE — 85610 PROTHROMBIN TIME: CPT

## 2023-04-11 PROCEDURE — 96361 HYDRATE IV INFUSION ADD-ON: CPT

## 2023-04-11 PROCEDURE — 71045 X-RAY EXAM CHEST 1 VIEW: CPT

## 2023-04-11 PROCEDURE — 99285 EMERGENCY DEPT VISIT HI MDM: CPT

## 2023-04-11 PROCEDURE — 82330 ASSAY OF CALCIUM: CPT

## 2023-04-11 PROCEDURE — 84484 ASSAY OF TROPONIN QUANT: CPT

## 2023-04-11 PROCEDURE — 84439 ASSAY OF FREE THYROXINE: CPT

## 2023-04-11 PROCEDURE — 82010 KETONE BODYS QUAN: CPT

## 2023-04-11 PROCEDURE — 84443 ASSAY THYROID STIM HORMONE: CPT

## 2023-04-11 PROCEDURE — 96374 THER/PROPH/DIAG INJ IV PUSH: CPT

## 2023-04-11 PROCEDURE — 80048 BASIC METABOLIC PNL TOTAL CA: CPT

## 2023-04-11 PROCEDURE — 83605 ASSAY OF LACTIC ACID: CPT

## 2023-04-11 PROCEDURE — 96375 TX/PRO/DX INJ NEW DRUG ADDON: CPT

## 2023-04-11 PROCEDURE — 6370000000 HC RX 637 (ALT 250 FOR IP): Performed by: STUDENT IN AN ORGANIZED HEALTH CARE EDUCATION/TRAINING PROGRAM

## 2023-04-11 PROCEDURE — 2580000003 HC RX 258

## 2023-04-11 PROCEDURE — 6360000002 HC RX W HCPCS

## 2023-04-11 PROCEDURE — 83735 ASSAY OF MAGNESIUM: CPT

## 2023-04-11 PROCEDURE — 84520 ASSAY OF UREA NITROGEN: CPT

## 2023-04-11 PROCEDURE — 85014 HEMATOCRIT: CPT

## 2023-04-11 PROCEDURE — 85730 THROMBOPLASTIN TIME PARTIAL: CPT

## 2023-04-11 PROCEDURE — 82565 ASSAY OF CREATININE: CPT

## 2023-04-11 PROCEDURE — 82947 ASSAY GLUCOSE BLOOD QUANT: CPT

## 2023-04-11 PROCEDURE — 2000000000 HC ICU R&B

## 2023-04-11 RX ORDER — 0.9 % SODIUM CHLORIDE 0.9 %
1000 INTRAVENOUS SOLUTION INTRAVENOUS ONCE
Status: DISCONTINUED | OUTPATIENT
Start: 2023-04-11 | End: 2023-04-12

## 2023-04-11 RX ORDER — 0.9 % SODIUM CHLORIDE 0.9 %
30 INTRAVENOUS SOLUTION INTRAVENOUS ONCE
Status: COMPLETED | OUTPATIENT
Start: 2023-04-11 | End: 2023-04-12

## 2023-04-11 RX ORDER — MAGNESIUM SULFATE 1 G/100ML
1000 INJECTION INTRAVENOUS PRN
Status: DISCONTINUED | OUTPATIENT
Start: 2023-04-11 | End: 2023-04-12

## 2023-04-11 RX ORDER — 0.9 % SODIUM CHLORIDE 0.9 %
1000 INTRAVENOUS SOLUTION INTRAVENOUS ONCE
Status: COMPLETED | OUTPATIENT
Start: 2023-04-11 | End: 2023-04-11

## 2023-04-11 RX ORDER — ADENOSINE 3 MG/ML
INJECTION, SOLUTION INTRAVENOUS
Status: COMPLETED
Start: 2023-04-11 | End: 2023-04-12

## 2023-04-11 RX ORDER — POTASSIUM CHLORIDE 7.45 MG/ML
10 INJECTION INTRAVENOUS PRN
Status: DISCONTINUED | OUTPATIENT
Start: 2023-04-11 | End: 2023-04-12

## 2023-04-11 RX ORDER — METOPROLOL TARTRATE 50 MG/1
25 TABLET, FILM COATED ORAL ONCE
Status: CANCELLED | OUTPATIENT
Start: 2023-04-11

## 2023-04-11 RX ORDER — MORPHINE SULFATE 4 MG/ML
4 INJECTION, SOLUTION INTRAMUSCULAR; INTRAVENOUS ONCE
Status: COMPLETED | OUTPATIENT
Start: 2023-04-11 | End: 2023-04-11

## 2023-04-11 RX ORDER — DEXTROSE AND SODIUM CHLORIDE 5; .45 G/100ML; G/100ML
INJECTION, SOLUTION INTRAVENOUS CONTINUOUS PRN
Status: DISCONTINUED | OUTPATIENT
Start: 2023-04-11 | End: 2023-04-12

## 2023-04-11 RX ORDER — SODIUM CHLORIDE 450 MG/100ML
INJECTION, SOLUTION INTRAVENOUS CONTINUOUS
Status: DISCONTINUED | OUTPATIENT
Start: 2023-04-11 | End: 2023-04-12

## 2023-04-11 RX ORDER — MORPHINE SULFATE 4 MG/ML
2 INJECTION, SOLUTION INTRAMUSCULAR; INTRAVENOUS EVERY 4 HOURS PRN
Status: DISCONTINUED | OUTPATIENT
Start: 2023-04-11 | End: 2023-04-13

## 2023-04-11 RX ORDER — LORAZEPAM 2 MG/ML
1 INJECTION INTRAMUSCULAR ONCE
Status: COMPLETED | OUTPATIENT
Start: 2023-04-11 | End: 2023-04-11

## 2023-04-11 RX ORDER — DEXTROSE AND SODIUM CHLORIDE 5; .45 G/100ML; G/100ML
INJECTION, SOLUTION INTRAVENOUS
Status: COMPLETED
Start: 2023-04-11 | End: 2023-04-12

## 2023-04-11 RX ORDER — METOPROLOL TARTRATE 50 MG/1
25 TABLET, FILM COATED ORAL 2 TIMES DAILY
Status: DISCONTINUED | OUTPATIENT
Start: 2023-04-11 | End: 2023-04-12

## 2023-04-11 RX ORDER — SODIUM CHLORIDE 450 MG/100ML
INJECTION, SOLUTION INTRAVENOUS
Status: COMPLETED
Start: 2023-04-11 | End: 2023-04-11

## 2023-04-11 RX ORDER — DILTIAZEM HYDROCHLORIDE 180 MG/1
360 CAPSULE, COATED, EXTENDED RELEASE ORAL DAILY
Status: CANCELLED | OUTPATIENT
Start: 2023-04-12

## 2023-04-11 RX ORDER — ONDANSETRON 2 MG/ML
4 INJECTION INTRAMUSCULAR; INTRAVENOUS ONCE
Status: COMPLETED | OUTPATIENT
Start: 2023-04-11 | End: 2023-04-11

## 2023-04-11 RX ORDER — DEXTROSE MONOHYDRATE 100 MG/ML
INJECTION, SOLUTION INTRAVENOUS CONTINUOUS PRN
Status: DISCONTINUED | OUTPATIENT
Start: 2023-04-11 | End: 2023-04-12

## 2023-04-11 RX ADMIN — MORPHINE SULFATE 4 MG: 4 INJECTION INTRAVENOUS at 22:13

## 2023-04-11 RX ADMIN — SODIUM CHLORIDE 1000 ML: 9 INJECTION, SOLUTION INTRAVENOUS at 21:19

## 2023-04-11 RX ADMIN — SODIUM CHLORIDE 0.1 UNITS/KG/HR: 9 INJECTION, SOLUTION INTRAVENOUS at 23:34

## 2023-04-11 RX ADMIN — SODIUM CHLORIDE: 450 INJECTION, SOLUTION INTRAVENOUS at 23:35

## 2023-04-11 RX ADMIN — LORAZEPAM 1 MG: 2 INJECTION INTRAMUSCULAR; INTRAVENOUS at 23:36

## 2023-04-11 RX ADMIN — ONDANSETRON 4 MG: 2 INJECTION INTRAMUSCULAR; INTRAVENOUS at 21:19

## 2023-04-11 RX ADMIN — SODIUM CHLORIDE: 4.5 INJECTION, SOLUTION INTRAVENOUS at 23:35

## 2023-04-11 ASSESSMENT — PAIN - FUNCTIONAL ASSESSMENT: PAIN_FUNCTIONAL_ASSESSMENT: 0-10

## 2023-04-11 ASSESSMENT — PAIN DESCRIPTION - LOCATION: LOCATION: CHEST

## 2023-04-11 ASSESSMENT — PAIN DESCRIPTION - DESCRIPTORS: DESCRIPTORS: PRESSURE

## 2023-04-11 ASSESSMENT — PAIN SCALES - GENERAL
PAINLEVEL_OUTOF10: 7
PAINLEVEL_OUTOF10: 6

## 2023-04-12 ENCOUNTER — APPOINTMENT (OUTPATIENT)
Dept: CT IMAGING | Age: 43
DRG: 308 | End: 2023-04-12
Payer: MEDICARE

## 2023-04-12 LAB
ALLEN TEST: POSITIVE
ANION GAP SERPL CALCULATED.3IONS-SCNC: 11 MMOL/L (ref 9–17)
ANION GAP SERPL CALCULATED.3IONS-SCNC: 11 MMOL/L (ref 9–17)
ANION GAP SERPL CALCULATED.3IONS-SCNC: 6 MMOL/L (ref 9–17)
BILIRUBIN URINE: NEGATIVE
BUN SERPL-MCNC: 7 MG/DL (ref 6–20)
BUN SERPL-MCNC: 7 MG/DL (ref 6–20)
BUN SERPL-MCNC: 8 MG/DL (ref 6–20)
CALCIUM SERPL-MCNC: 7 MG/DL (ref 8.6–10.4)
CALCIUM SERPL-MCNC: 7.1 MG/DL (ref 8.6–10.4)
CALCIUM SERPL-MCNC: 7.4 MG/DL (ref 8.6–10.4)
CASTS UA: NORMAL /LPF (ref 0–8)
CHLORIDE SERPL-SCNC: 106 MMOL/L (ref 98–107)
CHLORIDE SERPL-SCNC: 106 MMOL/L (ref 98–107)
CHLORIDE SERPL-SCNC: 107 MMOL/L (ref 98–107)
CK SERPL-CCNC: 96 U/L (ref 26–192)
CO2 SERPL-SCNC: 20 MMOL/L (ref 20–31)
CO2 SERPL-SCNC: 20 MMOL/L (ref 20–31)
CO2 SERPL-SCNC: 24 MMOL/L (ref 20–31)
COLOR: YELLOW
CREAT SERPL-MCNC: 0.62 MG/DL (ref 0.5–0.9)
CREAT SERPL-MCNC: 0.62 MG/DL (ref 0.5–0.9)
CREAT SERPL-MCNC: 0.79 MG/DL (ref 0.5–0.9)
EKG ATRIAL RATE: 125 BPM
EKG ATRIAL RATE: 133 BPM
EKG ATRIAL RATE: 227 BPM
EKG P AXIS: 54 DEGREES
EKG P AXIS: 54 DEGREES
EKG P-R INTERVAL: 130 MS
EKG P-R INTERVAL: 140 MS
EKG Q-T INTERVAL: 198 MS
EKG Q-T INTERVAL: 304 MS
EKG Q-T INTERVAL: 308 MS
EKG QRS DURATION: 68 MS
EKG QRS DURATION: 76 MS
EKG QRS DURATION: 78 MS
EKG QTC CALCULATION (BAZETT): 386 MS
EKG QTC CALCULATION (BAZETT): 444 MS
EKG QTC CALCULATION (BAZETT): 452 MS
EKG R AXIS: 34 DEGREES
EKG R AXIS: 46 DEGREES
EKG R AXIS: 48 DEGREES
EKG T AXIS: -102 DEGREES
EKG T AXIS: 46 DEGREES
EKG T AXIS: 52 DEGREES
EKG VENTRICULAR RATE: 125 BPM
EKG VENTRICULAR RATE: 133 BPM
EKG VENTRICULAR RATE: 229 BPM
EPITHELIAL CELLS UA: NORMAL /HPF (ref 0–5)
EST. AVERAGE GLUCOSE BLD GHB EST-MCNC: 200 MG/DL
GFR SERPL CREATININE-BSD FRML MDRD: >60 ML/MIN/1.73M2
GLUCOSE BLD-MCNC: 117 MG/DL (ref 65–105)
GLUCOSE BLD-MCNC: 130 MG/DL (ref 65–105)
GLUCOSE BLD-MCNC: 132 MG/DL
GLUCOSE BLD-MCNC: 132 MG/DL (ref 65–105)
GLUCOSE BLD-MCNC: 145 MG/DL (ref 65–105)
GLUCOSE BLD-MCNC: 146 MG/DL (ref 65–105)
GLUCOSE BLD-MCNC: 210 MG/DL (ref 65–105)
GLUCOSE BLD-MCNC: 38 MG/DL (ref 65–105)
GLUCOSE BLD-MCNC: 40 MG/DL (ref 65–105)
GLUCOSE BLD-MCNC: 44 MG/DL (ref 65–105)
GLUCOSE BLD-MCNC: 56 MG/DL (ref 74–100)
GLUCOSE BLD-MCNC: 73 MG/DL (ref 65–105)
GLUCOSE BLD-MCNC: 76 MG/DL (ref 65–105)
GLUCOSE BLD-MCNC: 78 MG/DL (ref 65–105)
GLUCOSE BLD-MCNC: 79 MG/DL (ref 65–105)
GLUCOSE BLD-MCNC: >600 MG/DL (ref 65–105)
GLUCOSE SERPL-MCNC: 144 MG/DL (ref 70–99)
GLUCOSE SERPL-MCNC: 208 MG/DL (ref 70–99)
GLUCOSE SERPL-MCNC: 96 MG/DL (ref 70–99)
GLUCOSE UR STRIP.AUTO-MCNC: ABNORMAL MG/DL
HBA1C MFR BLD: 8.6 % (ref 4–6)
KETONES UR STRIP.AUTO-MCNC: NEGATIVE MG/DL
LACTIC ACID, SEPSIS WHOLE BLOOD: 3.1 MMOL/L (ref 0.5–1.9)
LACTIC ACID, SEPSIS WHOLE BLOOD: 4.7 MMOL/L (ref 0.5–1.9)
LEUKOCYTE ESTERASE UR QL STRIP.AUTO: ABNORMAL
LIPASE SERPL-CCNC: 14 U/L (ref 13–60)
MAGNESIUM SERPL-MCNC: 1.8 MG/DL (ref 1.6–2.6)
MAGNESIUM SERPL-MCNC: 1.9 MG/DL (ref 1.6–2.6)
MAGNESIUM SERPL-MCNC: 1.9 MG/DL (ref 1.6–2.6)
MRSA, DNA, NASAL: NEGATIVE
NITRITE UR QL STRIP.AUTO: NEGATIVE
PHOSPHATE SERPL-MCNC: 2.3 MG/DL (ref 2.6–4.5)
PHOSPHATE SERPL-MCNC: 2.5 MG/DL (ref 2.6–4.5)
PHOSPHATE SERPL-MCNC: 4.1 MG/DL (ref 2.6–4.5)
POC HCO3: 23.8 MMOL/L (ref 21–28)
POC O2 SATURATION: 98 % (ref 94–98)
POC PCO2: 34.5 MM HG (ref 35–48)
POC PH: 7.45 (ref 7.35–7.45)
POC PO2: 102.9 MM HG (ref 83–108)
POSITIVE BASE EXCESS, ART: 0 (ref 0–3)
POTASSIUM SERPL-SCNC: 3.3 MMOL/L (ref 3.7–5.3)
POTASSIUM SERPL-SCNC: 3.5 MMOL/L (ref 3.7–5.3)
POTASSIUM SERPL-SCNC: 4.2 MMOL/L (ref 3.7–5.3)
PROT UR STRIP.AUTO-MCNC: 6 MG/DL (ref 5–8)
PROT UR STRIP.AUTO-MCNC: ABNORMAL MG/DL
RBC CLUMPS #/AREA URNS AUTO: NORMAL /HPF (ref 0–4)
REASON FOR REJECTION: NORMAL
SAMPLE SITE: ABNORMAL
SODIUM SERPL-SCNC: 136 MMOL/L (ref 135–144)
SODIUM SERPL-SCNC: 137 MMOL/L (ref 135–144)
SODIUM SERPL-SCNC: 138 MMOL/L (ref 135–144)
SPECIFIC GRAVITY UA: 1.01 (ref 1–1.03)
SPECIMEN DESCRIPTION: NORMAL
TROPONIN I SERPL DL<=0.01 NG/ML-MCNC: 37 NG/L (ref 0–14)
TURBIDITY: CLEAR
URINE HGB: NEGATIVE
UROBILINOGEN, URINE: NORMAL
WBC UA: NORMAL /HPF (ref 0–5)
ZZ NTE CLEAN UP: ORDERED TEST: NORMAL
ZZ NTE WITH NAME CLEAN UP: SPECIMEN SOURCE: NORMAL

## 2023-04-12 PROCEDURE — 81001 URINALYSIS AUTO W/SCOPE: CPT

## 2023-04-12 PROCEDURE — 99291 CRITICAL CARE FIRST HOUR: CPT | Performed by: INTERNAL MEDICINE

## 2023-04-12 PROCEDURE — 82550 ASSAY OF CK (CPK): CPT

## 2023-04-12 PROCEDURE — 6370000000 HC RX 637 (ALT 250 FOR IP)

## 2023-04-12 PROCEDURE — 83036 HEMOGLOBIN GLYCOSYLATED A1C: CPT

## 2023-04-12 PROCEDURE — 83735 ASSAY OF MAGNESIUM: CPT

## 2023-04-12 PROCEDURE — 82803 BLOOD GASES ANY COMBINATION: CPT

## 2023-04-12 PROCEDURE — 6360000002 HC RX W HCPCS

## 2023-04-12 PROCEDURE — 84484 ASSAY OF TROPONIN QUANT: CPT

## 2023-04-12 PROCEDURE — 93005 ELECTROCARDIOGRAM TRACING: CPT | Performed by: INTERNAL MEDICINE

## 2023-04-12 PROCEDURE — 6370000000 HC RX 637 (ALT 250 FOR IP): Performed by: STUDENT IN AN ORGANIZED HEALTH CARE EDUCATION/TRAINING PROGRAM

## 2023-04-12 PROCEDURE — 37799 UNLISTED PX VASCULAR SURGERY: CPT

## 2023-04-12 PROCEDURE — 2580000003 HC RX 258: Performed by: STUDENT IN AN ORGANIZED HEALTH CARE EDUCATION/TRAINING PROGRAM

## 2023-04-12 PROCEDURE — 2000000000 HC ICU R&B

## 2023-04-12 PROCEDURE — 36415 COLL VENOUS BLD VENIPUNCTURE: CPT

## 2023-04-12 PROCEDURE — 2580000003 HC RX 258

## 2023-04-12 PROCEDURE — C9113 INJ PANTOPRAZOLE SODIUM, VIA: HCPCS

## 2023-04-12 PROCEDURE — 36600 WITHDRAWAL OF ARTERIAL BLOOD: CPT

## 2023-04-12 PROCEDURE — 80048 BASIC METABOLIC PNL TOTAL CA: CPT

## 2023-04-12 PROCEDURE — 93010 ELECTROCARDIOGRAM REPORT: CPT | Performed by: INTERNAL MEDICINE

## 2023-04-12 PROCEDURE — 2500000003 HC RX 250 WO HCPCS

## 2023-04-12 PROCEDURE — 83690 ASSAY OF LIPASE: CPT

## 2023-04-12 PROCEDURE — 87641 MR-STAPH DNA AMP PROBE: CPT

## 2023-04-12 PROCEDURE — 94664 DEMO&/EVAL PT USE INHALER: CPT

## 2023-04-12 PROCEDURE — 87040 BLOOD CULTURE FOR BACTERIA: CPT

## 2023-04-12 PROCEDURE — 82947 ASSAY GLUCOSE BLOOD QUANT: CPT

## 2023-04-12 PROCEDURE — 74176 CT ABD & PELVIS W/O CONTRAST: CPT

## 2023-04-12 PROCEDURE — 03HY32Z INSERTION OF MONITORING DEVICE INTO UPPER ARTERY, PERCUTANEOUS APPROACH: ICD-10-PCS | Performed by: INTERNAL MEDICINE

## 2023-04-12 PROCEDURE — G0108 DIAB MANAGE TRN  PER INDIV: HCPCS

## 2023-04-12 PROCEDURE — 83605 ASSAY OF LACTIC ACID: CPT

## 2023-04-12 PROCEDURE — 36620 INSERTION CATHETER ARTERY: CPT

## 2023-04-12 PROCEDURE — 2500000003 HC RX 250 WO HCPCS: Performed by: STUDENT IN AN ORGANIZED HEALTH CARE EDUCATION/TRAINING PROGRAM

## 2023-04-12 PROCEDURE — 84100 ASSAY OF PHOSPHORUS: CPT

## 2023-04-12 PROCEDURE — A4216 STERILE WATER/SALINE, 10 ML: HCPCS

## 2023-04-12 RX ORDER — ACETAMINOPHEN 650 MG/1
650 SUPPOSITORY RECTAL EVERY 6 HOURS PRN
Status: DISCONTINUED | OUTPATIENT
Start: 2023-04-12 | End: 2023-04-14 | Stop reason: HOSPADM

## 2023-04-12 RX ORDER — MAGNESIUM SULFATE IN WATER 40 MG/ML
2000 INJECTION, SOLUTION INTRAVENOUS ONCE
Status: COMPLETED | OUTPATIENT
Start: 2023-04-12 | End: 2023-04-12

## 2023-04-12 RX ORDER — LORAZEPAM 2 MG/ML
3 INJECTION INTRAMUSCULAR
Status: DISCONTINUED | OUTPATIENT
Start: 2023-04-12 | End: 2023-04-13

## 2023-04-12 RX ORDER — METOPROLOL TARTRATE 50 MG/1
50 TABLET, FILM COATED ORAL 2 TIMES DAILY
Status: DISCONTINUED | OUTPATIENT
Start: 2023-04-12 | End: 2023-04-14 | Stop reason: HOSPADM

## 2023-04-12 RX ORDER — METOPROLOL TARTRATE 5 MG/5ML
2.5 INJECTION INTRAVENOUS EVERY 5 MIN PRN
Status: DISCONTINUED | OUTPATIENT
Start: 2023-04-12 | End: 2023-04-14 | Stop reason: HOSPADM

## 2023-04-12 RX ORDER — TRAZODONE HYDROCHLORIDE 100 MG/1
100 TABLET ORAL NIGHTLY PRN
Status: DISCONTINUED | OUTPATIENT
Start: 2023-04-12 | End: 2023-04-14 | Stop reason: HOSPADM

## 2023-04-12 RX ORDER — SODIUM CHLORIDE 0.9 % (FLUSH) 0.9 %
5-40 SYRINGE (ML) INJECTION EVERY 12 HOURS SCHEDULED
Status: DISCONTINUED | OUTPATIENT
Start: 2023-04-12 | End: 2023-04-12

## 2023-04-12 RX ORDER — LABETALOL HYDROCHLORIDE 5 MG/ML
10 INJECTION, SOLUTION INTRAVENOUS EVERY 6 HOURS PRN
Status: DISCONTINUED | OUTPATIENT
Start: 2023-04-12 | End: 2023-04-12

## 2023-04-12 RX ORDER — MULTIVITAMIN WITH IRON
1 TABLET ORAL DAILY
Status: DISCONTINUED | OUTPATIENT
Start: 2023-04-12 | End: 2023-04-14 | Stop reason: HOSPADM

## 2023-04-12 RX ORDER — ONDANSETRON 2 MG/ML
4 INJECTION INTRAMUSCULAR; INTRAVENOUS EVERY 6 HOURS PRN
Status: DISCONTINUED | OUTPATIENT
Start: 2023-04-12 | End: 2023-04-14 | Stop reason: HOSPADM

## 2023-04-12 RX ORDER — FOLIC ACID 1 MG/1
1 TABLET ORAL DAILY
Status: DISCONTINUED | OUTPATIENT
Start: 2023-04-12 | End: 2023-04-12

## 2023-04-12 RX ORDER — DILTIAZEM HYDROCHLORIDE 5 MG/ML
10 INJECTION INTRAVENOUS ONCE
Status: COMPLETED | OUTPATIENT
Start: 2023-04-12 | End: 2023-04-12

## 2023-04-12 RX ORDER — POTASSIUM CHLORIDE 7.45 MG/ML
10 INJECTION INTRAVENOUS PRN
Status: DISCONTINUED | OUTPATIENT
Start: 2023-04-12 | End: 2023-04-12

## 2023-04-12 RX ORDER — DILTIAZEM HYDROCHLORIDE 5 MG/ML
INJECTION INTRAVENOUS
Status: COMPLETED
Start: 2023-04-12 | End: 2023-04-12

## 2023-04-12 RX ORDER — OLANZAPINE 15 MG/1
15 TABLET ORAL NIGHTLY
Status: DISCONTINUED | OUTPATIENT
Start: 2023-04-12 | End: 2023-04-14 | Stop reason: HOSPADM

## 2023-04-12 RX ORDER — INSULIN GLARGINE 100 [IU]/ML
12 INJECTION, SOLUTION SUBCUTANEOUS DAILY
Status: DISCONTINUED | OUTPATIENT
Start: 2023-04-13 | End: 2023-04-14 | Stop reason: HOSPADM

## 2023-04-12 RX ORDER — POLYETHYLENE GLYCOL 3350 17 G/17G
17 POWDER, FOR SOLUTION ORAL DAILY PRN
Status: DISCONTINUED | OUTPATIENT
Start: 2023-04-12 | End: 2023-04-14 | Stop reason: HOSPADM

## 2023-04-12 RX ORDER — PANTOPRAZOLE SODIUM 40 MG/1
40 TABLET, DELAYED RELEASE ORAL
Status: DISCONTINUED | OUTPATIENT
Start: 2023-04-13 | End: 2023-04-14 | Stop reason: HOSPADM

## 2023-04-12 RX ORDER — DEXTROSE MONOHYDRATE 25 G/50ML
INJECTION, SOLUTION INTRAVENOUS
Status: COMPLETED
Start: 2023-04-12 | End: 2023-04-12

## 2023-04-12 RX ORDER — SODIUM CHLORIDE 0.9 % (FLUSH) 0.9 %
5-40 SYRINGE (ML) INJECTION PRN
Status: DISCONTINUED | OUTPATIENT
Start: 2023-04-12 | End: 2023-04-14 | Stop reason: HOSPADM

## 2023-04-12 RX ORDER — SODIUM CHLORIDE 9 MG/ML
INJECTION, SOLUTION INTRAVENOUS PRN
Status: DISCONTINUED | OUTPATIENT
Start: 2023-04-12 | End: 2023-04-14 | Stop reason: HOSPADM

## 2023-04-12 RX ORDER — LORAZEPAM 2 MG/1
2 TABLET ORAL
Status: DISCONTINUED | OUTPATIENT
Start: 2023-04-12 | End: 2023-04-13

## 2023-04-12 RX ORDER — ACETAMINOPHEN 325 MG/1
650 TABLET ORAL EVERY 6 HOURS PRN
Status: DISCONTINUED | OUTPATIENT
Start: 2023-04-12 | End: 2023-04-14 | Stop reason: HOSPADM

## 2023-04-12 RX ORDER — BENZTROPINE MESYLATE 1 MG/1
1 TABLET ORAL 2 TIMES DAILY
Status: DISCONTINUED | OUTPATIENT
Start: 2023-04-12 | End: 2023-04-14 | Stop reason: HOSPADM

## 2023-04-12 RX ORDER — METOPROLOL TARTRATE 5 MG/5ML
5 INJECTION INTRAVENOUS EVERY 6 HOURS PRN
Status: DISCONTINUED | OUTPATIENT
Start: 2023-04-12 | End: 2023-04-12

## 2023-04-12 RX ORDER — FONDAPARINUX SODIUM 5 MG/.4ML
10 INJECTION SUBCUTANEOUS DAILY
Status: DISCONTINUED | OUTPATIENT
Start: 2023-04-12 | End: 2023-04-14 | Stop reason: HOSPADM

## 2023-04-12 RX ORDER — LORAZEPAM 2 MG/1
4 TABLET ORAL
Status: DISCONTINUED | OUTPATIENT
Start: 2023-04-12 | End: 2023-04-13

## 2023-04-12 RX ORDER — LANOLIN ALCOHOL/MO/W.PET/CERES
100 CREAM (GRAM) TOPICAL DAILY
Status: DISCONTINUED | OUTPATIENT
Start: 2023-04-12 | End: 2023-04-12

## 2023-04-12 RX ORDER — DEXTROSE AND SODIUM CHLORIDE 5; .45 G/100ML; G/100ML
INJECTION, SOLUTION INTRAVENOUS CONTINUOUS PRN
Status: DISCONTINUED | OUTPATIENT
Start: 2023-04-12 | End: 2023-04-12

## 2023-04-12 RX ORDER — LORAZEPAM 2 MG/ML
4 INJECTION INTRAMUSCULAR
Status: DISCONTINUED | OUTPATIENT
Start: 2023-04-12 | End: 2023-04-13

## 2023-04-12 RX ORDER — LORAZEPAM 1 MG/1
1 TABLET ORAL
Status: DISCONTINUED | OUTPATIENT
Start: 2023-04-12 | End: 2023-04-13

## 2023-04-12 RX ORDER — METOPROLOL SUCCINATE 25 MG/1
25 TABLET, EXTENDED RELEASE ORAL DAILY
Status: DISCONTINUED | OUTPATIENT
Start: 2023-04-12 | End: 2023-04-12

## 2023-04-12 RX ORDER — SODIUM CHLORIDE 450 MG/100ML
INJECTION, SOLUTION INTRAVENOUS CONTINUOUS
Status: DISCONTINUED | OUTPATIENT
Start: 2023-04-12 | End: 2023-04-12

## 2023-04-12 RX ORDER — PRAVASTATIN SODIUM 20 MG
40 TABLET ORAL NIGHTLY
Status: DISCONTINUED | OUTPATIENT
Start: 2023-04-12 | End: 2023-04-14 | Stop reason: HOSPADM

## 2023-04-12 RX ORDER — ONDANSETRON 4 MG/1
4 TABLET, ORALLY DISINTEGRATING ORAL EVERY 8 HOURS PRN
Status: DISCONTINUED | OUTPATIENT
Start: 2023-04-12 | End: 2023-04-14 | Stop reason: HOSPADM

## 2023-04-12 RX ORDER — SODIUM CHLORIDE 9 MG/ML
INJECTION, SOLUTION INTRAVENOUS PRN
Status: DISCONTINUED | OUTPATIENT
Start: 2023-04-12 | End: 2023-04-12

## 2023-04-12 RX ORDER — FLUOXETINE HYDROCHLORIDE 20 MG/1
40 CAPSULE ORAL DAILY
Status: DISCONTINUED | OUTPATIENT
Start: 2023-04-12 | End: 2023-04-14 | Stop reason: HOSPADM

## 2023-04-12 RX ORDER — ADENOSINE 3 MG/ML
INJECTION, SOLUTION INTRAVENOUS
Status: COMPLETED
Start: 2023-04-12 | End: 2023-04-12

## 2023-04-12 RX ORDER — MAGNESIUM SULFATE 1 G/100ML
1000 INJECTION INTRAVENOUS PRN
Status: DISCONTINUED | OUTPATIENT
Start: 2023-04-12 | End: 2023-04-14 | Stop reason: HOSPADM

## 2023-04-12 RX ORDER — SODIUM CHLORIDE 0.9 % (FLUSH) 0.9 %
5-40 SYRINGE (ML) INJECTION PRN
Status: DISCONTINUED | OUTPATIENT
Start: 2023-04-12 | End: 2023-04-12

## 2023-04-12 RX ORDER — LORAZEPAM 2 MG/ML
2 INJECTION INTRAMUSCULAR
Status: DISCONTINUED | OUTPATIENT
Start: 2023-04-12 | End: 2023-04-13

## 2023-04-12 RX ORDER — LORAZEPAM 2 MG/ML
1 INJECTION INTRAMUSCULAR
Status: DISCONTINUED | OUTPATIENT
Start: 2023-04-12 | End: 2023-04-13

## 2023-04-12 RX ADMIN — DILTIAZEM HYDROCHLORIDE 10 MG: 5 INJECTION INTRAVENOUS at 00:23

## 2023-04-12 RX ADMIN — LORAZEPAM 2 MG: 2 INJECTION INTRAMUSCULAR; INTRAVENOUS at 15:39

## 2023-04-12 RX ADMIN — DILTIAZEM HYDROCHLORIDE 5 MG/HR: 5 INJECTION INTRAVENOUS at 00:54

## 2023-04-12 RX ADMIN — DILTIAZEM HYDROCHLORIDE 5 MG/HR: 5 INJECTION INTRAVENOUS at 18:58

## 2023-04-12 RX ADMIN — FLUOXETINE HYDROCHLORIDE 40 MG: 20 CAPSULE ORAL at 08:13

## 2023-04-12 RX ADMIN — MORPHINE SULFATE 2 MG: 4 INJECTION INTRAVENOUS at 01:31

## 2023-04-12 RX ADMIN — LORAZEPAM 3 MG: 2 INJECTION INTRAMUSCULAR; INTRAVENOUS at 00:27

## 2023-04-12 RX ADMIN — POTASSIUM CHLORIDE 10 MEQ: 7.46 INJECTION, SOLUTION INTRAVENOUS at 17:48

## 2023-04-12 RX ADMIN — DEXTROSE AND SODIUM CHLORIDE: 5; .45 INJECTION, SOLUTION INTRAVENOUS at 00:38

## 2023-04-12 RX ADMIN — DEXTROSE MONOHYDRATE 125 ML: 100 INJECTION, SOLUTION INTRAVENOUS at 04:51

## 2023-04-12 RX ADMIN — DEXTROSE AND SODIUM CHLORIDE: 5; 450 INJECTION, SOLUTION INTRAVENOUS at 03:00

## 2023-04-12 RX ADMIN — LORAZEPAM 2 MG: 2 INJECTION INTRAMUSCULAR; INTRAVENOUS at 08:13

## 2023-04-12 RX ADMIN — METOPROLOL TARTRATE 50 MG: 25 TABLET, FILM COATED ORAL at 13:39

## 2023-04-12 RX ADMIN — LORAZEPAM 2 MG: 2 INJECTION INTRAMUSCULAR; INTRAVENOUS at 13:39

## 2023-04-12 RX ADMIN — DEXTROSE MONOHYDRATE 250 ML: 100 INJECTION, SOLUTION INTRAVENOUS at 21:21

## 2023-04-12 RX ADMIN — FOLIC ACID 1 MG: 1 TABLET ORAL at 08:13

## 2023-04-12 RX ADMIN — DEXTROSE AND SODIUM CHLORIDE: 5; 450 INJECTION, SOLUTION INTRAVENOUS at 00:38

## 2023-04-12 RX ADMIN — LORAZEPAM 4 MG: 2 INJECTION INTRAMUSCULAR; INTRAVENOUS at 03:19

## 2023-04-12 RX ADMIN — FONDAPARINUX SODIUM 10 MG: 5 INJECTION, SOLUTION SUBCUTANEOUS at 11:38

## 2023-04-12 RX ADMIN — PRAVASTATIN SODIUM 40 MG: 20 TABLET ORAL at 21:48

## 2023-04-12 RX ADMIN — MORPHINE SULFATE 2 MG: 4 INJECTION INTRAVENOUS at 13:39

## 2023-04-12 RX ADMIN — FOLIC ACID: 5 INJECTION, SOLUTION INTRAMUSCULAR; INTRAVENOUS; SUBCUTANEOUS at 08:31

## 2023-04-12 RX ADMIN — BENZTROPINE MESYLATE 1 MG: 1 TABLET ORAL at 21:48

## 2023-04-12 RX ADMIN — ADENOSINE: 3 INJECTION INTRAVENOUS at 00:21

## 2023-04-12 RX ADMIN — METOPROLOL TARTRATE 50 MG: 25 TABLET, FILM COATED ORAL at 21:48

## 2023-04-12 RX ADMIN — SODIUM PHOSPHATE, MONOBASIC, MONOHYDRATE AND SODIUM PHOSPHATE, DIBASIC, ANHYDROUS 20 MMOL: 142; 276 INJECTION, SOLUTION INTRAVENOUS at 13:35

## 2023-04-12 RX ADMIN — SODIUM CHLORIDE 0.05 UNITS/KG/HR: 9 INJECTION, SOLUTION INTRAVENOUS at 00:39

## 2023-04-12 RX ADMIN — POTASSIUM CHLORIDE 10 MEQ: 7.46 INJECTION, SOLUTION INTRAVENOUS at 18:59

## 2023-04-12 RX ADMIN — MORPHINE SULFATE 2 MG: 4 INJECTION INTRAVENOUS at 10:15

## 2023-04-12 RX ADMIN — MORPHINE SULFATE 2 MG: 4 INJECTION INTRAVENOUS at 17:42

## 2023-04-12 RX ADMIN — ADENOSINE 6 MG: 3 INJECTION, SOLUTION INTRAVENOUS at 00:01

## 2023-04-12 RX ADMIN — DEXTROSE MONOHYDRATE 50 ML: 25 INJECTION, SOLUTION INTRAVENOUS at 01:54

## 2023-04-12 RX ADMIN — ALCOHOL 1 TABLET: 70.47 GEL TOPICAL at 08:13

## 2023-04-12 RX ADMIN — SODIUM CHLORIDE 3675 ML: 9 INJECTION, SOLUTION INTRAVENOUS at 00:05

## 2023-04-12 RX ADMIN — MAGNESIUM SULFATE HEPTAHYDRATE 2000 MG: 40 INJECTION, SOLUTION INTRAVENOUS at 03:26

## 2023-04-12 RX ADMIN — Medication 5 UNITS: at 16:16

## 2023-04-12 RX ADMIN — MORPHINE SULFATE 2 MG: 4 INJECTION INTRAVENOUS at 05:59

## 2023-04-12 RX ADMIN — LORAZEPAM 2 MG: 2 INJECTION INTRAMUSCULAR; INTRAVENOUS at 11:38

## 2023-04-12 RX ADMIN — BENZTROPINE MESYLATE 1 MG: 1 TABLET ORAL at 08:13

## 2023-04-12 RX ADMIN — OLANZAPINE 15 MG: 15 TABLET, FILM COATED ORAL at 21:48

## 2023-04-12 RX ADMIN — LORAZEPAM 4 MG: 2 INJECTION INTRAMUSCULAR; INTRAVENOUS at 02:05

## 2023-04-12 RX ADMIN — PANTOPRAZOLE SODIUM 40 MG: 40 INJECTION, POWDER, FOR SOLUTION INTRAVENOUS at 08:12

## 2023-04-12 RX ADMIN — SODIUM CHLORIDE: 9 INJECTION, SOLUTION INTRAVENOUS at 03:25

## 2023-04-12 RX ADMIN — METOPROLOL TARTRATE 2.5 MG: 1 INJECTION, SOLUTION INTRAVENOUS at 11:38

## 2023-04-12 RX ADMIN — METOPROLOL TARTRATE 2.5 MG: 1 INJECTION, SOLUTION INTRAVENOUS at 08:12

## 2023-04-12 RX ADMIN — LORAZEPAM 2 MG: 2 INJECTION INTRAMUSCULAR; INTRAVENOUS at 17:41

## 2023-04-12 ASSESSMENT — PAIN SCALES - GENERAL
PAINLEVEL_OUTOF10: 6
PAINLEVEL_OUTOF10: 7
PAINLEVEL_OUTOF10: 9
PAINLEVEL_OUTOF10: 8
PAINLEVEL_OUTOF10: 8
PAINLEVEL_OUTOF10: 6
PAINLEVEL_OUTOF10: 7
PAINLEVEL_OUTOF10: 8
PAINLEVEL_OUTOF10: 6

## 2023-04-12 ASSESSMENT — ENCOUNTER SYMPTOMS
CONSTIPATION: 0
SHORTNESS OF BREATH: 1
EYE REDNESS: 0
DIARRHEA: 0
PHOTOPHOBIA: 0
BACK PAIN: 0
VOMITING: 1
COLOR CHANGE: 0
COUGH: 0
ABDOMINAL PAIN: 0
NAUSEA: 1
SINUS PAIN: 0
CHEST TIGHTNESS: 0
TROUBLE SWALLOWING: 0
SORE THROAT: 0
SINUS PRESSURE: 0

## 2023-04-12 ASSESSMENT — PAIN DESCRIPTION - LOCATION
LOCATION: BACK

## 2023-04-12 ASSESSMENT — PAIN DESCRIPTION - PAIN TYPE: TYPE: CHRONIC PAIN

## 2023-04-12 ASSESSMENT — PAIN DESCRIPTION - ORIENTATION
ORIENTATION: LEFT
ORIENTATION: MID

## 2023-04-12 ASSESSMENT — PAIN - FUNCTIONAL ASSESSMENT: PAIN_FUNCTIONAL_ASSESSMENT: ACTIVITIES ARE NOT PREVENTED

## 2023-04-12 ASSESSMENT — PAIN DESCRIPTION - DESCRIPTORS: DESCRIPTORS: PRESSURE

## 2023-04-12 NOTE — CONSULTS
Attestation signed by      Attending Physician Statement:    I have discussed the care of  Kacie Burgos , including pertinent history and exam findings, with the Cardiology fellow/resident. I have seen and examined the patient and the key elements of all parts of the encounter have been performed by me. I agree with the assessment, plan and orders as documented by the fellow/resident, after I modified exam findings and plan of treatments, and the final version is my approved version of the assessment. Additional Comments: [facility]iology    Consult / H&P               Today's Date: 4/12/2023  Patient Name: Kacie Burgos  Date of admission: 4/11/2023  9:00 PM  Patient's age: 36 y.o., 1980  Admission Dx: Paroxysmal supraventricular tachycardia ([facility] [address] [I47.1]  DKA, type 2, not at goal [facility]) [E11.10]  Diabetic ketoacidosis without coma associated with other specified diabetes mellitus ([facility] [address] [E13.10]    Reason for Consult:  Cardiac evaluation for SVT controlled on cardizem    Requesting Physician: Jono Kinney MD    CHIEF COMPLAINT:  irregular heart beat (SVT)    History Obtained From:  patient, electronic medical record    HISTORY OF PRESENT ILLNESS:    36 yr female with significant PMH of SVT on Cardizem , hx of APLA with H of DVT and PE s/p IVC filter, on Fondaparinux, DM 2, hx bariatric surgery , hx of suicidal attempt and hx of Bipolar disorder. Presented to ED after she called EMS as she was not feeling well and was having palpitations. She has hx of SVT and tried valsalva maneuvers but didn't help. As per EMS her HR was in 190 s. She received IM versed x 1 and cardioverted x 2. In the ED her HR was 100- 130 s, and was in SVT. She was given Adenosine 6mg followed by Adenosine 12 mg x 1, HR remained in 190 s, pt was started on Cardizem bolus followed by gtt and admitted to MICU.     Pt stated that she had 8-10 shots of alcohol last night before her

## 2023-04-12 NOTE — ED NOTES
The following labs were labeled with appropriate pt sticker and tubed to lab:     [] Blue     [] Lavender   [] on ice  [x] Green/yellow  [x] Green/black [] on ice  [] Carmen Gerry  [] on ice  [] Yellow  [] Red  [] Type/ Screen  [] ABG  [] VBG    [] COVID-19 swab    [] Rapid  [] PCR  [] Flu swab  [] Peds Viral Panel     [] Urine Sample  [] Fecal Sample  [] Pelvic Cultures  [] Blood Cultures  [] X 2  [] STREP Cultures       Janie Keene RN  04/11/23 [id number]

## 2023-04-12 NOTE — PLAN OF CARE
Problem: Discharge Planning  Goal: Discharge to home or other facility with appropriate resources  4/12/2023 0720 by Chuy Giordano RN  Outcome: Progressing  4/12/2023 0357 by Durga South RN  Outcome: Progressing     Problem: Pain  Goal: Verbalizes/displays adequate comfort level or baseline comfort level  4/12/2023 0720 by Chuy Giordano RN  Outcome: Progressing  4/12/2023 0357 by Durga South RN  Outcome: Progressing     Problem: Skin/Tissue Integrity  Goal: Absence of new skin breakdown  Description: 1. Monitor for areas of redness and/or skin breakdown  2. Assess vascular access sites hourly  3. Every 4-6 hours minimum:  Change oxygen saturation probe site  4. Every 4-6 hours:  If on nasal continuous positive airway pressure, respiratory therapy assess nares and determine need for appliance change or resting period.   4/12/2023 0720 by Chuy Giordano RN  Outcome: Progressing  4/12/2023 0357 by Durga South RN  Outcome: Progressing     Problem: Safety - Adult  Goal: Free from fall injury  4/12/2023 0720 by Chuy Giordano RN  Outcome: Progressing  4/12/2023 0357 by Durga South RN  Outcome: Progressing  Flowsheets (Taken 4/12/2023 0211)  Free From Fall Injury: Instruct family/caregiver on patient safety     Problem: ABCDS Injury Assessment  Goal: Absence of physical injury  4/12/2023 0720 by Chuy Giordano RN  Outcome: Progressing  4/12/2023 0357 by Durga South RN  Outcome: Progressing  Flowsheets (Taken 4/12/2023 0211)  Absence of Physical Injury: Implement safety measures based on patient assessment

## 2023-04-12 NOTE — ED PROVIDER NOTES
Faculty Sign-Out Attestation  Handoff taken on the following patient from prior Attending Physician: Christine May    I was available and discussed any additional care issues that arose and coordinated the management plans with the resident(s) caring for the patient during my duty period. Any areas of disagreement with residents documentation of care or procedures are noted on the chart. I was personally present for the key portions of any/all procedures during my duty period. I have documented in the chart those procedures where I was not present during the key portions.     DKA, getting insulin / iv fluid, needing icu admit    >>> svt, refractory to adenosine,   Critical care admitting       Latanya Solomon, DO  04/12/23 0009
[facility] [address] [facility]     Emergency Department     Faculty Attestation    I performed a history and physical examination of the patient and discussed management with the resident. I reviewed the residents note and agree with the documented findings and plan of care. Any areas of disagreement are noted on the chart. I was personally present for the key portions of any procedures. I have documented in the chart those procedures where I was not present during the key portions. I have reviewed the emergency nurses triage note. I agree with the chief complaint, past medical history, past surgical history, allergies, medications, social and family history as documented unless otherwise noted below. For Physician Assistant/ Nurse Practitioner cases/documentation I have personally evaluated this patient and have completed at least one if not all key elements of the E/M (history, physical exam, and MDM). Additional findings are as noted. Patient arrives by EMS for elevated blood sugar and possible SVT. History of diabetes high blood sugars today EMS was called. Patient was tachycardic and when he is 190s, EMS was unable to obtain IV access, gave IM Versed and cardioverted patient twice. Heart rate did improve into the 130s. On arrival awake alert and oriented appropriate. Normal mental status no respiratory distress speaking in full sentences. Is tachycardic but strong pulses throughout. Abdomen soft obese nontender. EKG shows sinus tachycardia. Examining strips provided by EMS likely sinus tach as I do see discernible P waves in multiple leads. Regardless we will proceed with DKA work-up which patient has had before monitor closely anticipate admission.     EKG interpretation sinus tach 133 normal intervals normal axis no acute ST or T changes normal EKG except for rate      Critical Care     none    Sofie Parish MD, Devyn Snyder  Attending Emergency  Physician           Sofie Parish,
General: Skin is warm. Neurological:      General: No focal deficit present. Mental Status: She is alert and oriented to person, place, and time. DDX/DIAGNOSTIC RESULTS / EMERGENCY DEPARTMENT COURSE / MDM     Medical Decision Making  Morbidly obese 78-year-old female with complex past medical history presents with concerns for DKA and SVT. Patient did get synchronized cardioverted x2 by EMS prior to arrival, also received 2 mg of IM Versed. Noted to be tachycardic but appears to be sinus tach on arrival, elevated blood glucose, concerns for DKA. We will plan for DKA work-up likely started on insulin. Amount and/or Complexity of Data Reviewed  Independent Historian: EMS  External Data Reviewed: notes. Labs: ordered. Decision-making details documented in ED Course. Radiology: ordered and independent interpretation performed. Decision-making details documented in ED Course. ECG/medicine tests: ordered and independent interpretation performed. Decision-making details documented in ED Course. Risk  OTC drugs. Prescription drug management. Decision regarding hospitalization. Diagnosis or treatment significantly limited by social determinants of health. Critical Care  Total time providing critical care: 40 minutes        EKG  Sinus tachycardia    All EKG's are interpreted by the Emergency Department Physician who either signs or Co-signs this chart in the absence of a cardiologist.    EMERGENCY DEPARTMENT COURSE:      ED Course as of 04/12/23 0028 Tue Apr 11, 2023 2203 No leukocytosis, elevated TSH, lactic acid of 8.1, troponin elevated at 27. [CD]   2207 No history of thyroid abnormalities, not on any medications. [CD]   2217 Glucose, Random(!!): 562 [CD]   2217 Beta-Hydroxybutyrate(!): 0.28 [CD]   2217 Patient with elevated glucose, elevated beta hydroxy, will start on insulin infusion.  [CD]   2224 Lactic Acid, Sepsis, Whole Blood(!): 6.4 [CD]   2240 Given DKA and SVT that required

## 2023-04-12 NOTE — CARE COORDINATION
Met with pt after receiving a social work consult for \"consideration of rehab\". Pt is alert and oriented. Pt states that her  lost his job of 21 years and she didn't know what else to do so she drank liquor. She states that she has been in [place] [id number] for 9 years and very active with her meetings. She states that she called her sponsor  Pt states that she only drank one day and feels that going back to AA will be her best treatment. She declined other resources.

## 2023-04-12 NOTE — CARE COORDINATION
04/12/23 1739   Readmission Assessment   Number of Days since last admission? 8-30 days   Previous Disposition Home with Family   Who is being Interviewed Patient   What was the patient's/caregiver's perception as to why they think they needed to return back to the hospital? Other (Comment)  (BS was 509)   Did you visit your Primary Care Physician after you left the hospital, before you returned this time? Yes   Did you see a specialist, such as Cardiac, Pulmonary, Orthopedic Physician, etc. after you left the hospital? Yes  (psych dr Obi Moon)   Who advised the patient to return to the hospital? Self-referral   Does the patient report anything that got in the way of taking their medications? No   In our efforts to provide the best possible care to you and others like you, can you think of anything that we could have done to help you after you left the hospital the first time, so that you might not have needed to return so soon?  Other (Comment)  (none)

## 2023-04-12 NOTE — ED NOTES
Contacted pharmacy regarding clarification for insulin bolus and drip for the pt because of the current  mg/dL. Per Bubba Scheuermann D. Start the infusion and hold the bolus.      Seema Reynolds RN  04/11/23 8051

## 2023-04-12 NOTE — CARE COORDINATION
Case Management Assessment  Initial Evaluation    Date/Time of Evaluation: 4/12/2023 2:55 PM  Assessment Completed by: RIOS Lopez    If patient is discharged prior to next notation, then this note serves as note for discharge by case management. Patient Name: Tad Campo                   YOB: 1980  Diagnosis: Paroxysmal supraventricular tachycardia ([facility] [address] [I47.1]  DKA, type 2, not at goal [facility]) [E11.10]  Diabetic ketoacidosis without coma associated with other specified diabetes mellitus ([facility] [address] [E13.10]                   Date / Time: 4/11/2023  9:00 PM    Patient Admission Status: Inpatient   Readmission Risk (Low < 19, Mod (19-27), High > 27): Readmission Risk Score: 23.5    Current PCP: Antonella Hernandes  PCP verified by CM? Yes    Chart Reviewed: Yes      History Provided by: Patient  Patient Orientation: Alert and Oriented    Patient Cognition: Alert    Hospitalization in the last 30 days (Readmission):  No    If yes, Readmission Assessment in CM Navigator will be completed. Advance Directives:      Code Status: Full Code   Patient's Primary Decision Maker is: Legal Next of Kin      Discharge Planning:    Patient lives with: Other (Comment) (ex- Dain Escoto) Type of Home: House  Primary Care Giver: Self  Patient Support Systems include: Spouse/Significant Other, Children   Current Financial resources: Medicaid, Medicare  Current community resources:    Current services prior to admission: None            Current DME:              Type of Home Care services:  None    ADLS  Prior functional level: Independent in ADLs/IADLs  Current functional level: Independent in ADLs/IADLs    PT AM-PAC:   /24  OT AM-PAC:   /24    Family can provide assistance at DC: Yes  Would you like Case Management to discuss the discharge plan with any other family members/significant others, and if so, who?  No  Plans to Return to Present Housing: Yes  Other Identified Issues/Barriers to RETURNING to

## 2023-04-12 NOTE — PROCEDURES
Insert Arterial Line  Date/Time:  04/12/23, 9:27 AM  Performed by: Jerry Kahn RCP    Patient identity confirmed: arm band and provided demographic data   Time out: Immediately prior to procedure a \"time out\" was called to verify the correct patient, procedure, equipment, support staff. Preparation: Patient was prepped and draped in the usual sterile fashion.     Location: Right radial    Shlomo's test normal: yes  Needle gauge: 20     Number of attempts: 1  Post-procedure: transparent dressing applied and line secured    Patient tolerance: well

## 2023-04-12 NOTE — ED NOTES
Lopressor 2.5mg given by Calixto ROGEL via IVP as per Critical care resident verbal order.      Clarita Carter RN  04/12/23 7253

## 2023-04-12 NOTE — H&P
Critical Care - History and Physical Examination    Patient's name:  Pastor Marmolejo  Medical Record Number: [id number]  Patient's account/billing number: [de-identified]  Patient's YOB: 1980  Age: 36 y.o. Date of Admission: 4/11/2023  9:00 PM  Date of History and Physical Examination: 4/11/2023      Primary Care Physician: Zenobia Soto  Attending Physician: dr. Salazar Given Status: Prior    Chief complaint:   Chief Complaint   Patient presents with    Irregular Heart Beat     SVT    Hyperglycemia         HISTORY OF PRESENT ILLNESS:      History was obtained from chart review and the patient. 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 She also experienced palpitations. EMS was called her HR was found to be in 190's, EMS was unable to find IV acess and gave IM versed 10 mg x 1 and cardioverted her twice. On arrival patient's HR was fluctuating between 110's to 130's. AO x 4. Patient's blood glucose was 562 with AG 23, Lactic acid 6.4 with bicarb of 15. Cbc unremarkable. Patient stated her  got fired and she had 10 shots of fireball yesterday.  States she doesn't drink too much.     7.36/29./30.2/16.6    Trop 27 and 28  LFT elevated liver chemisteries AST 55 and ALT 49 and     Patient went into SVT again while in ED; received adenosine 6 mg followed by adenosine 12 mg x 1 and HR still in 190's when I arrived; started cardizem bolus and drop and started on CIWA protocol     Of note patient was recently admitted 13/10/23 - 3/12/23 for hallucinations for alcohol withdrawal ? and sodium level of 128    PAST MEDICAL HISTORY:         Diagnosis Date

## 2023-04-12 NOTE — PLAN OF CARE
Problem: Discharge Planning  Goal: Discharge to home or other facility with appropriate resources  Outcome: Progressing     Problem: Pain  Goal: Verbalizes/displays adequate comfort level or baseline comfort level  Outcome: Progressing     Problem: Skin/Tissue Integrity  Goal: Absence of new skin breakdown  Description: 1. Monitor for areas of redness and/or skin breakdown  2. Assess vascular access sites hourly  3. Every 4-6 hours minimum:  Change oxygen saturation probe site  4. Every 4-6 hours:  If on nasal continuous positive airway pressure, respiratory therapy assess nares and determine need for appliance change or resting period.   Outcome: Progressing     Problem: Safety - Adult  Goal: Free from fall injury  Outcome: Progressing  Flowsheets (Taken 4/12/2023 0211)  Free From Fall Injury: Instruct family/caregiver on patient safety     Problem: ABCDS Injury Assessment  Goal: Absence of physical injury  Outcome: Progressing  Flowsheets (Taken 4/12/2023 0211)  Absence of Physical Injury: Implement safety measures based on patient assessment

## 2023-04-12 NOTE — ED NOTES
10mg of diltiazem given by Altagracia Chadwick as per the verbal order of critical care resident.      Rhona Wayne RN  04/12/23 7761

## 2023-04-12 NOTE — ED NOTES
Pt. Presents to the ED for a complaint of hyperglycemia initially. On ems arrival pt noted in svt with a hr in the 180's. Ems unable to obtain IV access. Pt given 10mg of versed and two total shocks to cardiovert pt. Back to sinus tach with hr 120-135. Pt states that her resting hr is around 110-120's. Pt on arrival is a&ox4 with even and non labored resp. Pt remains sinus tach at this time. Pt does have a hx of svt in the past and has been given adenosine. Pt taken off oxygen at this time. O2 sat 97% on RA. Attempting new iv access as us line no longer viable. Dr. Kinjal Del Castillo and Dr. Russ Zamorano at the bedside for evaluation. Will continue with plan of care.      Guillaume Kidd RN  04/11/23 2112

## 2023-04-12 NOTE — ED NOTES
Report given to Jorden Meadows RN with verbal understanding of the pt needs and concern. Pt will be transported to [facility] with all personal belongings together with the pt.        Jagruti Santoyo RN  04/12/23 2968

## 2023-04-12 NOTE — PLAN OF CARE
Was called by ED for patient being in SVT again; patient received 6 of adenosine x 1 and 12 adenosine x 1 per ED resident. Went bedside, HR still in 190's. Started Cardizem bolus and drip. Patient received 10 mg of Cardizem bolus; awaiting drip from pharmacy; HR still in 120's. Gave 2.5 of lopressor x 1. Prn lopressor ordered. HR in 110's now.      Santana Au MD

## 2023-04-12 NOTE — ED NOTES
12mg of adenosine given by Autumn RN via IVP. As per Dr. Le Ramirez verbal order.      Geovanni Stein RN  04/12/23 0005

## 2023-04-13 LAB
ABSOLUTE EOS #: <0.03 K/UL (ref 0–0.44)
ABSOLUTE IMMATURE GRANULOCYTE: <0.03 K/UL (ref 0–0.3)
ABSOLUTE LYMPH #: 1.27 K/UL (ref 1.1–3.7)
ABSOLUTE MONO #: 0.37 K/UL (ref 0.1–1.2)
ANION GAP SERPL CALCULATED.3IONS-SCNC: 7 MMOL/L (ref 9–17)
BASOPHILS # BLD: 1 % (ref 0–2)
BASOPHILS ABSOLUTE: 0.03 K/UL (ref 0–0.2)
BUN SERPL-MCNC: 9 MG/DL (ref 6–20)
CALCIUM SERPL-MCNC: 7.6 MG/DL (ref 8.6–10.4)
CHLORIDE SERPL-SCNC: 110 MMOL/L (ref 98–107)
CO2 SERPL-SCNC: 22 MMOL/L (ref 20–31)
CREAT SERPL-MCNC: 0.58 MG/DL (ref 0.5–0.9)
EOSINOPHILS RELATIVE PERCENT: 0 % (ref 1–4)
GFR SERPL CREATININE-BSD FRML MDRD: >60 ML/MIN/1.73M2
GLUCOSE BLD-MCNC: 105 MG/DL (ref 65–105)
GLUCOSE BLD-MCNC: 133 MG/DL (ref 65–105)
GLUCOSE BLD-MCNC: 145 MG/DL (ref 65–105)
GLUCOSE BLD-MCNC: 218 MG/DL (ref 65–105)
GLUCOSE BLD-MCNC: 222 MG/DL (ref 65–105)
GLUCOSE BLD-MCNC: 49 MG/DL (ref 65–105)
GLUCOSE SERPL-MCNC: 164 MG/DL (ref 70–99)
HCT VFR BLD AUTO: 35.9 % (ref 36.3–47.1)
HCT VFR BLD AUTO: 37.6 % (ref 36.3–47.1)
HGB BLD-MCNC: 11.4 G/DL (ref 11.9–15.1)
HGB BLD-MCNC: 11.8 G/DL (ref 11.9–15.1)
IMMATURE GRANULOCYTES: 0 %
LYMPHOCYTES # BLD: 26 % (ref 24–43)
MCH RBC QN AUTO: 31.1 PG (ref 25.2–33.5)
MCHC RBC AUTO-ENTMCNC: 31.4 G/DL (ref 28.4–34.8)
MCV RBC AUTO: 98.9 FL (ref 82.6–102.9)
MONOCYTES # BLD: 8 % (ref 3–12)
NRBC AUTOMATED: 0 PER 100 WBC
PDW BLD-RTO: 16.1 % (ref 11.8–14.4)
PLATELET # BLD AUTO: 200 K/UL (ref 138–453)
PMV BLD AUTO: 12 FL (ref 8.1–13.5)
POTASSIUM SERPL-SCNC: 3.9 MMOL/L (ref 3.7–5.3)
RBC # BLD: 3.8 M/UL (ref 3.95–5.11)
RBC # BLD: ABNORMAL 10*6/UL
SEG NEUTROPHILS: 65 % (ref 36–65)
SEGMENTED NEUTROPHILS ABSOLUTE COUNT: 3.19 K/UL (ref 1.5–8.1)
SODIUM SERPL-SCNC: 139 MMOL/L (ref 135–144)
WBC # BLD AUTO: 4.9 K/UL (ref 3.5–11.3)

## 2023-04-13 PROCEDURE — 2580000003 HC RX 258

## 2023-04-13 PROCEDURE — 6370000000 HC RX 637 (ALT 250 FOR IP): Performed by: STUDENT IN AN ORGANIZED HEALTH CARE EDUCATION/TRAINING PROGRAM

## 2023-04-13 PROCEDURE — 85018 HEMOGLOBIN: CPT

## 2023-04-13 PROCEDURE — 6360000002 HC RX W HCPCS

## 2023-04-13 PROCEDURE — 1200000000 HC SEMI PRIVATE

## 2023-04-13 PROCEDURE — 99291 CRITICAL CARE FIRST HOUR: CPT | Performed by: INTERNAL MEDICINE

## 2023-04-13 PROCEDURE — 85014 HEMATOCRIT: CPT

## 2023-04-13 PROCEDURE — 6370000000 HC RX 637 (ALT 250 FOR IP)

## 2023-04-13 PROCEDURE — 80048 BASIC METABOLIC PNL TOTAL CA: CPT

## 2023-04-13 PROCEDURE — 2500000003 HC RX 250 WO HCPCS

## 2023-04-13 PROCEDURE — 6360000002 HC RX W HCPCS: Performed by: STUDENT IN AN ORGANIZED HEALTH CARE EDUCATION/TRAINING PROGRAM

## 2023-04-13 PROCEDURE — 82947 ASSAY GLUCOSE BLOOD QUANT: CPT

## 2023-04-13 PROCEDURE — 6370000000 HC RX 637 (ALT 250 FOR IP): Performed by: NURSE PRACTITIONER

## 2023-04-13 PROCEDURE — 85025 COMPLETE CBC W/AUTO DIFF WBC: CPT

## 2023-04-13 RX ORDER — LORAZEPAM 2 MG/1
2 TABLET ORAL 2 TIMES DAILY PRN
Status: DISCONTINUED | OUTPATIENT
Start: 2023-04-13 | End: 2023-04-14 | Stop reason: HOSPADM

## 2023-04-13 RX ORDER — LIDOCAINE 4 G/G
1 PATCH TOPICAL DAILY
Status: DISCONTINUED | OUTPATIENT
Start: 2023-04-13 | End: 2023-04-14 | Stop reason: HOSPADM

## 2023-04-13 RX ORDER — CYCLOBENZAPRINE HCL 10 MG
10 TABLET ORAL 3 TIMES DAILY PRN
Status: DISCONTINUED | OUTPATIENT
Start: 2023-04-13 | End: 2023-04-14 | Stop reason: HOSPADM

## 2023-04-13 RX ORDER — DEXTROSE MONOHYDRATE 100 MG/ML
INJECTION, SOLUTION INTRAVENOUS CONTINUOUS PRN
Status: DISCONTINUED | OUTPATIENT
Start: 2023-04-13 | End: 2023-04-14 | Stop reason: HOSPADM

## 2023-04-13 RX ORDER — INSULIN LISPRO 100 [IU]/ML
0-4 INJECTION, SOLUTION INTRAVENOUS; SUBCUTANEOUS NIGHTLY
Status: DISCONTINUED | OUTPATIENT
Start: 2023-04-13 | End: 2023-04-14 | Stop reason: HOSPADM

## 2023-04-13 RX ORDER — INSULIN LISPRO 100 [IU]/ML
0-8 INJECTION, SOLUTION INTRAVENOUS; SUBCUTANEOUS
Status: DISCONTINUED | OUTPATIENT
Start: 2023-04-13 | End: 2023-04-14 | Stop reason: HOSPADM

## 2023-04-13 RX ORDER — MORPHINE SULFATE 4 MG/ML
4 INJECTION, SOLUTION INTRAMUSCULAR; INTRAVENOUS ONCE
Status: COMPLETED | OUTPATIENT
Start: 2023-04-13 | End: 2023-04-13

## 2023-04-13 RX ADMIN — LORAZEPAM 2 MG: 2 TABLET ORAL at 05:02

## 2023-04-13 RX ADMIN — FONDAPARINUX SODIUM 10 MG: 5 INJECTION, SOLUTION SUBCUTANEOUS at 09:11

## 2023-04-13 RX ADMIN — CYCLOBENZAPRINE 10 MG: 10 TABLET, FILM COATED ORAL at 09:54

## 2023-04-13 RX ADMIN — TRAZODONE HYDROCHLORIDE 100 MG: 100 TABLET ORAL at 20:41

## 2023-04-13 RX ADMIN — BENZTROPINE MESYLATE 1 MG: 1 TABLET ORAL at 20:41

## 2023-04-13 RX ADMIN — FLUOXETINE HYDROCHLORIDE 40 MG: 20 CAPSULE ORAL at 09:11

## 2023-04-13 RX ADMIN — PRAVASTATIN SODIUM 40 MG: 20 TABLET ORAL at 20:41

## 2023-04-13 RX ADMIN — LORAZEPAM 2 MG: 2 TABLET ORAL at 20:41

## 2023-04-13 RX ADMIN — Medication 5 UNITS: at 15:20

## 2023-04-13 RX ADMIN — ALCOHOL 1 TABLET: 70.47 GEL TOPICAL at 09:11

## 2023-04-13 RX ADMIN — ACETAMINOPHEN 650 MG: 325 TABLET ORAL at 05:02

## 2023-04-13 RX ADMIN — Medication 16 G: at 19:16

## 2023-04-13 RX ADMIN — FOLIC ACID: 5 INJECTION, SOLUTION INTRAMUSCULAR; INTRAVENOUS; SUBCUTANEOUS at 09:19

## 2023-04-13 RX ADMIN — OLANZAPINE 15 MG: 15 TABLET, FILM COATED ORAL at 20:41

## 2023-04-13 RX ADMIN — BENZTROPINE MESYLATE 1 MG: 1 TABLET ORAL at 09:10

## 2023-04-13 RX ADMIN — METOPROLOL TARTRATE 50 MG: 25 TABLET, FILM COATED ORAL at 20:41

## 2023-04-13 RX ADMIN — MORPHINE SULFATE 4 MG: 4 INJECTION INTRAVENOUS at 13:35

## 2023-04-13 RX ADMIN — METOPROLOL TARTRATE 50 MG: 25 TABLET, FILM COATED ORAL at 09:28

## 2023-04-13 RX ADMIN — INSULIN GLARGINE 12 UNITS: 100 INJECTION, SOLUTION SUBCUTANEOUS at 09:28

## 2023-04-13 RX ADMIN — CYCLOBENZAPRINE 10 MG: 10 TABLET, FILM COATED ORAL at 20:41

## 2023-04-13 RX ADMIN — PANTOPRAZOLE SODIUM 40 MG: 40 TABLET, DELAYED RELEASE ORAL at 05:02

## 2023-04-13 ASSESSMENT — PAIN SCALES - GENERAL
PAINLEVEL_OUTOF10: 9
PAINLEVEL_OUTOF10: 7
PAINLEVEL_OUTOF10: 8
PAINLEVEL_OUTOF10: 6
PAINLEVEL_OUTOF10: 7

## 2023-04-13 ASSESSMENT — PAIN DESCRIPTION - LOCATION
LOCATION: BACK
LOCATION: BACK
LOCATION: BACK;ABDOMEN
LOCATION: BACK

## 2023-04-13 ASSESSMENT — PAIN DESCRIPTION - ORIENTATION
ORIENTATION: MID
ORIENTATION: LEFT;MID

## 2023-04-13 ASSESSMENT — PAIN DESCRIPTION - DESCRIPTORS
DESCRIPTORS: DISCOMFORT
DESCRIPTORS: PRESSURE

## 2023-04-13 NOTE — PLAN OF CARE
Problem: Discharge Planning  Goal: Discharge to home or other facility with appropriate resources  Outcome: Progressing     Problem: Pain  Goal: Verbalizes/displays adequate comfort level or baseline comfort level  Recent Flowsheet Documentation  Taken 4/12/2023 1200 by Itzel Owen RN  Verbalizes/displays adequate comfort level or baseline comfort level:   Encourage patient to monitor pain and request assistance   Assess pain using appropriate pain scale   Administer analgesics based on type and severity of pain and evaluate response

## 2023-04-13 NOTE — PLAN OF CARE
Problem: Discharge Planning  Goal: Discharge to home or other facility with appropriate resources  4/13/2023 1110 by Brigitte Saha RN  Outcome: Progressing  Flowsheets (Taken 4/13/2023 0800)  Discharge to home or other facility with appropriate resources:   Arrange for needed discharge resources and transportation as appropriate   Arrange for interpreters to assist at discharge as needed   Refer to discharge planning if patient needs post-hospital services based on physician order or complex needs related to functional status, cognitive ability or social support system   Identify discharge learning needs (meds, wound care, etc)   Identify barriers to discharge with patient and caregiver  4/13/2023 0024 by Xavier Patel  Outcome: Progressing     Problem: Pain  Goal: Verbalizes/displays adequate comfort level or baseline comfort level  Outcome: Progressing  Flowsheets  Taken 4/13/2023 1105  Verbalizes/displays adequate comfort level or baseline comfort level:   Encourage patient to monitor pain and request assistance   Assess pain using appropriate pain scale   Administer analgesics based on type and severity of pain and evaluate response  Taken 4/13/2023 0800  Verbalizes/displays adequate comfort level or baseline comfort level:   Encourage patient to monitor pain and request assistance   Assess pain using appropriate pain scale   Administer analgesics based on type and severity of pain and evaluate response     Problem: Skin/Tissue Integrity  Goal: Absence of new skin breakdown  Description: 1. Monitor for areas of redness and/or skin breakdown  2. Assess vascular access sites hourly  3. Every 4-6 hours minimum:  Change oxygen saturation probe site  4. Every 4-6 hours:  If on nasal continuous positive airway pressure, respiratory therapy assess nares and determine need for appliance change or resting period.   Outcome: Progressing     Problem: Safety - Adult  Goal: Free from fall injury  Outcome: Progressing

## 2023-04-14 VITALS
TEMPERATURE: 97.9 F | WEIGHT: 275.13 LBS | HEIGHT: 68 IN | DIASTOLIC BLOOD PRESSURE: 75 MMHG | HEART RATE: 91 BPM | RESPIRATION RATE: 18 BRPM | OXYGEN SATURATION: 98 % | BODY MASS INDEX: 41.7 KG/M2 | SYSTOLIC BLOOD PRESSURE: 123 MMHG

## 2023-04-14 PROBLEM — E87.6 HYPOKALEMIA: Status: ACTIVE | Noted: 2023-04-14

## 2023-04-14 PROBLEM — R79.89 TROPONIN LEVEL ELEVATED: Status: ACTIVE | Noted: 2023-04-14

## 2023-04-14 PROBLEM — E83.51 HYPOCALCEMIA: Status: ACTIVE | Noted: 2023-04-14

## 2023-04-14 PROBLEM — R77.8 TROPONIN LEVEL ELEVATED: Status: ACTIVE | Noted: 2023-04-14

## 2023-04-14 PROBLEM — E11.10 DIABETIC ACIDOSIS WITHOUT COMA (HCC): Status: ACTIVE | Noted: 2023-04-14

## 2023-04-14 LAB
ABSOLUTE EOS #: 0.09 K/UL (ref 0–0.44)
ABSOLUTE IMMATURE GRANULOCYTE: <0.03 K/UL (ref 0–0.3)
ABSOLUTE LYMPH #: 1.81 K/UL (ref 1.1–3.7)
ABSOLUTE MONO #: 0.45 K/UL (ref 0.1–1.2)
ANION GAP SERPL CALCULATED.3IONS-SCNC: 8 MMOL/L (ref 9–17)
BASOPHILS # BLD: 1 % (ref 0–2)
BASOPHILS ABSOLUTE: 0.04 K/UL (ref 0–0.2)
BUN SERPL-MCNC: 11 MG/DL (ref 6–20)
CALCIUM SERPL-MCNC: 7.7 MG/DL (ref 8.6–10.4)
CHLORIDE SERPL-SCNC: 107 MMOL/L (ref 98–107)
CO2 SERPL-SCNC: 20 MMOL/L (ref 20–31)
CREAT SERPL-MCNC: 0.82 MG/DL (ref 0.5–0.9)
EOSINOPHILS RELATIVE PERCENT: 2 % (ref 1–4)
GFR SERPL CREATININE-BSD FRML MDRD: >60 ML/MIN/1.73M2
GLUCOSE BLD-MCNC: 195 MG/DL (ref 65–105)
GLUCOSE BLD-MCNC: 197 MG/DL (ref 65–105)
GLUCOSE BLD-MCNC: 329 MG/DL (ref 65–105)
GLUCOSE SERPL-MCNC: 158 MG/DL (ref 70–99)
HCT VFR BLD AUTO: 37.2 % (ref 36.3–47.1)
HGB BLD-MCNC: 12 G/DL (ref 11.9–15.1)
IMMATURE GRANULOCYTES: 1 %
LYMPHOCYTES # BLD: 41 % (ref 24–43)
MCH RBC QN AUTO: 31.7 PG (ref 25.2–33.5)
MCHC RBC AUTO-ENTMCNC: 32.3 G/DL (ref 28.4–34.8)
MCV RBC AUTO: 98.2 FL (ref 82.6–102.9)
MONOCYTES # BLD: 10 % (ref 3–12)
NRBC AUTOMATED: 0 PER 100 WBC
PDW BLD-RTO: 15.9 % (ref 11.8–14.4)
PLATELET # BLD AUTO: 164 K/UL (ref 138–453)
PMV BLD AUTO: 12.2 FL (ref 8.1–13.5)
POTASSIUM SERPL-SCNC: 4.2 MMOL/L (ref 3.7–5.3)
RBC # BLD: 3.79 M/UL (ref 3.95–5.11)
RBC # BLD: ABNORMAL 10*6/UL
SEG NEUTROPHILS: 45 % (ref 36–65)
SEGMENTED NEUTROPHILS ABSOLUTE COUNT: 1.97 K/UL (ref 1.5–8.1)
SODIUM SERPL-SCNC: 135 MMOL/L (ref 135–144)
WBC # BLD AUTO: 4.4 K/UL (ref 3.5–11.3)

## 2023-04-14 PROCEDURE — 6370000000 HC RX 637 (ALT 250 FOR IP): Performed by: STUDENT IN AN ORGANIZED HEALTH CARE EDUCATION/TRAINING PROGRAM

## 2023-04-14 PROCEDURE — 82947 ASSAY GLUCOSE BLOOD QUANT: CPT

## 2023-04-14 PROCEDURE — 6370000000 HC RX 637 (ALT 250 FOR IP)

## 2023-04-14 PROCEDURE — 36415 COLL VENOUS BLD VENIPUNCTURE: CPT

## 2023-04-14 PROCEDURE — 6370000000 HC RX 637 (ALT 250 FOR IP): Performed by: NURSE PRACTITIONER

## 2023-04-14 PROCEDURE — 2580000003 HC RX 258

## 2023-04-14 PROCEDURE — 6370000000 HC RX 637 (ALT 250 FOR IP): Performed by: INTERNAL MEDICINE

## 2023-04-14 PROCEDURE — 6360000002 HC RX W HCPCS: Performed by: NURSE PRACTITIONER

## 2023-04-14 PROCEDURE — 99239 HOSP IP/OBS DSCHRG MGMT >30: CPT | Performed by: INTERNAL MEDICINE

## 2023-04-14 PROCEDURE — 99232 SBSQ HOSP IP/OBS MODERATE 35: CPT | Performed by: INTERNAL MEDICINE

## 2023-04-14 PROCEDURE — 2500000003 HC RX 250 WO HCPCS

## 2023-04-14 PROCEDURE — 80048 BASIC METABOLIC PNL TOTAL CA: CPT

## 2023-04-14 PROCEDURE — 85025 COMPLETE CBC W/AUTO DIFF WBC: CPT

## 2023-04-14 PROCEDURE — 6360000002 HC RX W HCPCS

## 2023-04-14 RX ORDER — CALCIUM CARBONATE 200(500)MG
1000 TABLET,CHEWABLE ORAL ONCE
Status: COMPLETED | OUTPATIENT
Start: 2023-04-14 | End: 2023-04-14

## 2023-04-14 RX ORDER — METOPROLOL TARTRATE 50 MG/1
50 TABLET, FILM COATED ORAL 2 TIMES DAILY
Qty: 60 TABLET | Refills: 3 | Status: ON HOLD | OUTPATIENT
Start: 2023-04-14 | End: 2023-04-19 | Stop reason: HOSPADM

## 2023-04-14 RX ORDER — DILTIAZEM HYDROCHLORIDE 180 MG/1
360 CAPSULE, COATED, EXTENDED RELEASE ORAL DAILY
Status: DISCONTINUED | OUTPATIENT
Start: 2023-04-14 | End: 2023-04-14 | Stop reason: HOSPADM

## 2023-04-14 RX ORDER — KETOROLAC TROMETHAMINE 30 MG/ML
30 INJECTION, SOLUTION INTRAMUSCULAR; INTRAVENOUS ONCE
Status: COMPLETED | OUTPATIENT
Start: 2023-04-14 | End: 2023-04-14

## 2023-04-14 RX ORDER — POTASSIUM CHLORIDE 7.45 MG/ML
10 INJECTION INTRAVENOUS PRN
Status: DISCONTINUED | OUTPATIENT
Start: 2023-04-14 | End: 2023-04-14 | Stop reason: HOSPADM

## 2023-04-14 RX ORDER — POTASSIUM CHLORIDE 20 MEQ/1
40 TABLET, EXTENDED RELEASE ORAL PRN
Status: DISCONTINUED | OUTPATIENT
Start: 2023-04-14 | End: 2023-04-14 | Stop reason: HOSPADM

## 2023-04-14 RX ORDER — INSULIN GLARGINE 100 [IU]/ML
24 INJECTION, SOLUTION SUBCUTANEOUS DAILY
Qty: 10 ML | Refills: 0 | Status: SHIPPED
Start: 2023-04-14

## 2023-04-14 RX ORDER — OXYCODONE HYDROCHLORIDE AND ACETAMINOPHEN 5; 325 MG/1; MG/1
1 TABLET ORAL ONCE
Status: COMPLETED | OUTPATIENT
Start: 2023-04-14 | End: 2023-04-14

## 2023-04-14 RX ADMIN — ANTACID TABLETS 1000 MG: 500 TABLET, CHEWABLE ORAL at 11:15

## 2023-04-14 RX ADMIN — DILTIAZEM HYDROCHLORIDE 360 MG: 180 CAPSULE, COATED, EXTENDED RELEASE ORAL at 11:15

## 2023-04-14 RX ADMIN — OXYCODONE HYDROCHLORIDE AND ACETAMINOPHEN 1 TABLET: 5; 325 TABLET ORAL at 09:56

## 2023-04-14 RX ADMIN — PANTOPRAZOLE SODIUM 40 MG: 40 TABLET, DELAYED RELEASE ORAL at 06:26

## 2023-04-14 RX ADMIN — ALCOHOL 1 TABLET: 70.47 GEL TOPICAL at 09:31

## 2023-04-14 RX ADMIN — LORAZEPAM 2 MG: 2 TABLET ORAL at 09:45

## 2023-04-14 RX ADMIN — INSULIN LISPRO 6 UNITS: 100 INJECTION, SOLUTION INTRAVENOUS; SUBCUTANEOUS at 17:11

## 2023-04-14 RX ADMIN — CYCLOBENZAPRINE 10 MG: 10 TABLET, FILM COATED ORAL at 09:45

## 2023-04-14 RX ADMIN — FLUOXETINE HYDROCHLORIDE 40 MG: 20 CAPSULE ORAL at 09:30

## 2023-04-14 RX ADMIN — FONDAPARINUX SODIUM 10 MG: 5 INJECTION, SOLUTION SUBCUTANEOUS at 09:32

## 2023-04-14 RX ADMIN — LORAZEPAM 2 MG: 2 TABLET ORAL at 17:11

## 2023-04-14 RX ADMIN — INSULIN GLARGINE 12 UNITS: 100 INJECTION, SOLUTION SUBCUTANEOUS at 09:46

## 2023-04-14 RX ADMIN — KETOROLAC TROMETHAMINE 30 MG: 30 INJECTION, SOLUTION INTRAMUSCULAR at 02:07

## 2023-04-14 RX ADMIN — FOLIC ACID: 5 INJECTION, SOLUTION INTRAMUSCULAR; INTRAVENOUS; SUBCUTANEOUS at 09:59

## 2023-04-14 RX ADMIN — BENZTROPINE MESYLATE 1 MG: 1 TABLET ORAL at 09:32

## 2023-04-14 RX ADMIN — METOPROLOL TARTRATE 50 MG: 25 TABLET, FILM COATED ORAL at 09:31

## 2023-04-14 ASSESSMENT — ENCOUNTER SYMPTOMS
BACK PAIN: 0
COUGH: 0
SHORTNESS OF BREATH: 0
NAUSEA: 0
CHEST TIGHTNESS: 0
COLOR CHANGE: 0
ABDOMINAL PAIN: 0
EYE DISCHARGE: 0
VOMITING: 0
CHEST TIGHTNESS: 1

## 2023-04-14 ASSESSMENT — PAIN SCALES - GENERAL
PAINLEVEL_OUTOF10: 5
PAINLEVEL_OUTOF10: 6

## 2023-04-14 NOTE — PLAN OF CARE
Problem: Discharge Planning  Goal: Discharge to home or other facility with appropriate resources  Outcome: Progressing     Problem: Pain  Goal: Verbalizes/displays adequate comfort level or baseline comfort level  Outcome: Progressing  Flowsheets (Taken 4/13/2023 1515 by Daniel Hoyos RN)  Verbalizes/displays adequate comfort level or baseline comfort level:   Encourage patient to monitor pain and request assistance   Assess pain using appropriate pain scale   Administer analgesics based on type and severity of pain and evaluate response     Problem: Skin/Tissue Integrity  Goal: Absence of new skin breakdown  Description: 1. Monitor for areas of redness and/or skin breakdown  2. Assess vascular access sites hourly  3. Every 4-6 hours minimum:  Change oxygen saturation probe site  4. Every 4-6 hours:  If on nasal continuous positive airway pressure, respiratory therapy assess nares and determine need for appliance change or resting period.   Outcome: Progressing     Problem: Safety - Adult  Goal: Free from fall injury  Outcome: Progressing     Problem: ABCDS Injury Assessment  Goal: Absence of physical injury  Outcome: Progressing     Problem: Chronic Conditions and Co-morbidities  Goal: Patient's chronic conditions and co-morbidity symptoms are monitored and maintained or improved  Outcome: Progressing

## 2023-04-14 NOTE — PLAN OF CARE
Problem: Discharge Planning  Goal: Discharge to home or other facility with appropriate resources  4/14/2023 1531 by Karina eDlcid RN  Outcome: Adequate for Discharge  4/14/2023 1531 by Karina Delcid RN  Outcome: Progressing  4/14/2023 0330 by Bina Carlin RN  Outcome: Progressing     Problem: Pain  Goal: Verbalizes/displays adequate comfort level or baseline comfort level  4/14/2023 1531 by Karina Delcid RN  Outcome: Adequate for Discharge  4/14/2023 1531 by Karina Delcid RN  Outcome: Progressing  4/14/2023 0330 by Bina Carlin RN  Outcome: Progressing  Flowsheets (Taken 4/13/2023 1515 by Bruce Olson RN)  Verbalizes/displays adequate comfort level or baseline comfort level:   Encourage patient to monitor pain and request assistance   Assess pain using appropriate pain scale   Administer analgesics based on type and severity of pain and evaluate response     Problem: Skin/Tissue Integrity  Goal: Absence of new skin breakdown  Description: 1. Monitor for areas of redness and/or skin breakdown  2. Assess vascular access sites hourly  3. Every 4-6 hours minimum:  Change oxygen saturation probe site  4. Every 4-6 hours:  If on nasal continuous positive airway pressure, respiratory therapy assess nares and determine need for appliance change or resting period.   4/14/2023 1531 by Karina Delcid RN  Outcome: Adequate for Discharge  4/14/2023 1531 by Karina Delcid RN  Outcome: Progressing  4/14/2023 0330 by Bina Carlin RN  Outcome: Progressing     Problem: Safety - Adult  Goal: Free from fall injury  4/14/2023 1531 by Karina Delcid RN  Outcome: Adequate for Discharge  4/14/2023 0330 by Bina Carlin RN  Outcome: Progressing     Problem: ABCDS Injury Assessment  Goal: Absence of physical injury  4/14/2023 1531 by Karina Delcid RN  Outcome: Adequate for Discharge  4/14/2023 0330 by Bina Carlin RN  Outcome: Progressing     Problem: Chronic Conditions and

## 2023-04-14 NOTE — PROGRESS NOTES
[address] [facility] [address]  PROGRESS NOTE    Shift date: 4.11.2023  Shift day: Tuesday   Shift # 2    Room # 12/12   Name: Yessi Morton                Rastafarian: unknown   Place of Rastafarian: unknown    Referral: Routine Visit    Admit Date & Time: 4/11/2023  9:00 PM    Assessment:  Yessi Morton is a 36 y.o. female in the hospital. Upon entering the room writer observes patient and family calm and coping. Intervention:  Writer introduced self and title as  Writer offered space for the patient and family member  to express feelings, needs, and concerns and provided a ministry presence. Outcome:  Patient and family remain calm and coping    Plan:  Chaplains will remain available to offer spiritual and emotional support as needed.       Electronically signed by Delfina Bailey on 4/11/2023 at 10:37 PM.  [facility]  [phone]       04/11/23 2115   Encounter Summary   Service Provided For: Patient and family together   Referral/Consult From: [facility] Family members   Last Encounter  04/11/23   Complexity of Encounter Moderate   Begin Time 2115   End Time  2130   Total Time Calculated 15 min   Encounter    Type Initial Screen/Assessment   Assessment/Intervention/Outcome   Assessment Calm;Coping   Intervention Active listening;Discussed illness injury and its impact   Outcome Expressed feelings, needs, and concerns;Coping       Electronically signed by Marino De Dios on 4/11/2023 at 10:37 PM
Critical Care Team - Daily Progress Note      Date and time: 4/13/2023 9:12 AM  Patient's name:  Isabell Diceky Record Number: [id number]  Patient's account/billing number: [de-identified]  Patient's YOB: 1980  Age: 36 y.o. Date of Admission: 4/11/2023  9:00 PM  Length of stay during current admission: 2      Primary Care Physician: Zenobia Soto  ICU Attending Physician: Dr. Moy Dubois Status: Full Code    Reason for ICU admission: DKA      SUBJECTIVE:     OVERNIGHT EVENTS:       HR stable. Is now bridged to subcutaneous insulin. Glucose is stable. On RA. Apparently took out her art line. Will get H&H to make sure she is not actively bleeding.      AWAKE & FOLLOWING COMMANDS:  [] No   [x] Yes    CURRENT VENTILATION STATUS:     [] Ventilator  [] BIPAP  [] Nasal Cannula [x] Room Air        SECRETIONS Amount:  [] Small [] Moderate  [] Large  [] None  Color:     [] White [] Colored  [] Bloody    SEDATION:  RAAS Score:  [] Propofol gtt  [] Versed gtt  [] Ativan gtt   [] No Sedation    PARALYZED:  [] No    [] Yes    DIARRHEA:                [] No                [] Yes  (C. Difficile status: [] positive                                                                                                                       [] negative                                                                                                                     [] pending)    VASOPRESSORS:  [x] No    [] Yes    If yes -   [] Levophed       [] Dopamine     [] Vasopressin       [] Dobutamine  [] Phenylephrine         [] Epinephrine    CENTRAL LINES:     [] No   [] Yes   (Date of Insertion:   )           If yes -     [] Right IJ     [] Left IJ [] Right Femoral [] Left Femoral                   [] Right Subclavian [] Left Subclavian       FAIR'S CATHETER:   [] No   [] Yes  (Date of Insertion:   )     URINE OUTPUT:            [x] Good   [] Low              [] Anuric      OBJECTIVE:     VITAL SIGNS:  BP (!) 117/53
Critical care team - Resident sign-out to medicine service      Date and time: 4/13/2023 5:14 PM  Patient's name:  Liborio Chaidez Record Number: [id number]  Patient's account/billing number: [de-identified]  Patient's YOB: 1980  Age: 36 y.o. Date of Admission: 4/11/2023  9:00 PM  Length of stay during current admission: 2    Primary Care Physician: Jocelyn Bolaños Status: Full Code    Mode of physician to physician communication:        [x] Via telephone   [] In person     Date and time of sign-out: 4/13/2023 5:14 PM    Accepting Internal Medicine resident: Jose Alfredo Turner Accepting Medicine team: Lillian. Accepting team's attending: Ava Forte. Patient's current ICU Bed:  8173    Patient's assigned bed on floor:  237        [x] Med-Surg Monitored [] Step-down       [] Psychiatry ICU       [] Psych floor     Reason for ICU admission:     DKA    ICU course summary:     Presented to ER after patient checked her blood glucose and was 509 per patient. She also experienced palpitations. EMS was called her HR was found to be in 190's, EMS was unable to find IV acess and gave IM versed 10 mg x 1 and cardioverted her twice. On arrival patient's HR was fluctuating between 110's to 130's. AO x 4. Patient's blood glucose was 562 with AG 23, Lactic acid 6.4 with bicarb of 15. Cbc unremarkable. Patient stated her  got fired and she had 10 shots of fireball yesterday. States she doesn't drink too much.      7.36/29./30.2/16.6     Trop 27 and 28  LFT elevated liver chemisteries AST 55 and ALT 49 and      Patient went into SVT again while in ED; received adenosine 6 mg followed by adenosine 12 mg x 1 and HR still in 190's when I arrived; started cardizem bolus and drop and started on CIWA protocol     Patient was bridged on subcu on 4/12. Glucose has been stable.      Procedures during patient's ICU stay:         Current Vitals:     BP (!) 146/86   Pulse 68   Temp
Inpatient Diabetes  Education     Type and Reason for Visit: Patient Education - DKA admission  Met with patient at bedside - she stated having diabetes since GDM dx with pregenancy - on insulins at home toujeo and novolog and has BG meter - she follows with EHSAN Riddle for her care. Lab Results   Component Value Date    LABA1C 8.6 (H) 04/12/2023    LABA1C 7.6 (H) 06/09/2022    LABA1C 7.8 05/04/2021     She stated she has both glcogan kit for low BG and ketone strips for urine for high BG. She stated stress high yesterday and feeling unwell overall with high BG as the cause of this DKA event. She states she knows how to use insulin pens and check her BG and generally keep better control. General aware of healthy eating guidelines, but sometimes not following        Verbally reviewed following information with patient   Current A1C, Blood glucose targets, hypo and hyperglycemia, importance of home blood glucose monitoring, heathy eating  plate method for CHO control portions, be active as recommended by health care providers, take medications oral and or insulin as directed      Education Folder provided with following support information:   _x__  Handout - What is diabetes? cornerstones4 care 2019  _x__  Handout - Eating Healthy for Life at every Meal / Plate method and grocery list  from www. [facility]  _x__ Handout - How to Check Your Blood Sugar from www. [facility]  _x__ Handout - Be safe with Needles teaching sheet - / www. [facility]    _x__ Handout - How to Use an Insulin Pen - handout with QR code - [employer] wise Current as of Sept 22 2021  _x__ Emergency Insert sheet for your Vehicle - ADA Diabetes Emergencies   _x__ Diabetes ID card - ADA Low and High Blood Sugar and treatments  __x_ [id number] [facility] Diabetes Education brochure/ contact card    Out patient diabetes education  contact number provided - [phone] to patient and placed on the discharge summary.  Noted
Jorge Kunz, [facility]. Paroxysmal supraventricular tachycardia (HCC) [I47.1]  DKA, type 2, not at goal [facility]) [E11.10]  Diabetic ketoacidosis without coma associated with other specified diabetes mellitus ([facility] [address] [E13.10] . Vitals:    04/12/23 0400   BP: (!) 177/89   Pulse: (!) 120   Resp: (!) 31   Temp:    SpO2: 94%   . Patients home meds are   Prior to Admission medications    Medication Sig Start Date End Date Taking?  Authorizing Provider   traZODone (DESYREL) 100 MG tablet Take 1 tablet by mouth nightly as needed for Sleep 3/20/23   Anjelica Manifold, APRN - CNP   insulin glargine (LANTUS) 100 UNIT/ML injection vial Inject 24 Units into the skin daily  Patient not taking: Reported on 4/12/2023 3/20/23   Anjelica Manifold, APRN - CNP   benztropine (COGENTIN) 1 MG tablet Take 1 tablet by mouth 2 times daily 3/20/23   Anjelica Manifold, APRN - CNP   OLANZapine (ZYPREXA) 15 MG tablet Take 1 tablet by mouth nightly 3/20/23   Anjelica Manifold, APRN - CNP   bumetanide (BUMEX) 1 MG tablet Take 1 tablet by mouth daily 3/20/23   Anjelica Manifold, APRN - CNP   FLUoxetine (PROZAC) 20 MG capsule Take 2 capsules by mouth daily 3/20/23   Anjelica Manifold, APRN - CNP   fondaparinux (ARIXTRA) 10 MG/0.8ML SOLN injection Inject 0.8 mLs into the skin daily 11/10/22   Historical Provider, MD   insulin lispro (HUMALOG) 100 UNIT/ML SOLN injection vial **Medium Dose Corrective Algorithm**  Glucose: Dose:  If <139             No Insulin  140-199 2 Units  200-249 4 Units  250-299 6 Units  300-349 8 Units  350-400 10 Units  Above 400       12 Units 6/12/22   Elizabeth Pierre MD   metoprolol succinate (TOPROL XL) 25 MG extended release tablet Take 1 tablet by mouth daily 6/13/22   Elizabeth Pierre MD   folic acid (FOLVITE) 1 MG tablet Take 1 tablet by mouth daily 6/13/22   Elizabeth Pierre MD   thiamine mononitrate (THIAMINE) 100 MG tablet Take 1 tablet by mouth daily  Patient not taking: Reported on 4/12/2023 6/13/22   Carloz GARCIA
[facility]   Progress Note                   Date:   4/13/2023  Patient name: Leveda Severin  Date of admission:  4/11/2023  9:00 PM  MRN:   [id number]  YOB: 1980  PCP: Saba Dan    Reason for Admission:  Irregular heart beat (SVT)  36 yr female with significant PMH of SVT on Cardizem , hx of APLA with H of DVT and PE s/p IVC filter, on Fondaparinux, DM 2, hx bariatric surgery , hx of suicidal attempt and hx of Bipolar disorder. Presented to ED after she called EMS as she was not feeling well and was having palpitations. She has hx of SVT and tried valsalva maneuvers but didn't help. As per EMS her HR was in 190 s. She received IM versed x 1 and cardioverted x 2. In the ED her HR was 100- 130 s, and was in SVT. She was given Adenosine 6mg followed by Adenosine 12 mg x 1, HR remained in 190 s, pt was started on Cardizem bolus followed by gtt and admitted to MICU. Subjective:       Pt seen and examined this morning. Requesting pain meds  Remained afebrile, HR 50-60 s overnight. BP soft this morning 90/35. S/p Cardizem gtt, stopped overnight. Currently on Lopressor 50 mg BID , and Fondaparinux for AC.    K 3.9, mag 1.8.   Cr improved 0.58  Hb 11.8    Medications:   Scheduled Meds:   benztropine  1 mg Oral BID    FLUoxetine  40 mg Oral Daily    fondaparinux  10 mg SubCUTAneous Daily    pravastatin  40 mg Oral Nightly    OLANZapine  15 mg Oral Nightly    thiamine and folic acid IVPB   IntraVENous Daily    multivitamin  1 tablet Oral Daily    pantoprazole  40 mg Oral QAM AC    metoprolol tartrate  50 mg Oral BID    insulin glargine  12 Units SubCUTAneous Daily    insulin lispro  5 Units SubCUTAneous TID AC       Continuous Infusions:   sodium chloride Stopped (04/13/23 0442)    dilTIAZem Stopped (04/12/23 2205)       CBC:   Recent Labs     04/11/23 2123 04/13/23 0445   WBC 6.9 4.9   HGB 14.0 11.8*    200     BMP:    Recent Labs     04/12/23  1249
[facility]  Office: [address], DO, Rafael Gabe, DO, Zoltan Chu, DO, Regina Kongthorn Blood, DO, Asim Pizano MD, Rosi Lynch MD, Parul Bartlett MD, Lily Liao MD,  Debra Avalos MD, Hiro Eric MD, Ajay Moralez, DO, Kylah Chawla MD,  Jayden Hooper MD, Timmy Prieto MD, Delisa Myers, DO, Ariana Marte MD, Tala Hernandez MD, Christiano Drew DO, Stefan Carmichael MD, Caitlin Munguia MD, Shannon Lorenzana MD, Katherleen Dakin, MD,  Brandon Li DO, Pablito Schulte MD,  Samir Ford CNP,  Zoey Johnston, CNP, Paras Dunham, CNP, Merlinda Ball, CNP,  Madhavi Her, DNP, Jannie Loop, CNP, Regino Booth, CNP, Myesha Parker, CNP, Luan Hagen, CNP, Vicki Grigsby, CNP, CLEO MayerC, Pippa Delong, MOISE, Zheng Knowles CNP, Brittany Hearing, [facility]    Progress Note    4/14/2023    10:52 AM    Name:   Sabi Santiago  MRN:     [id number]     Acct:      [de-identified]   Room:   [address] [place] [address] Day:  3  Admit Date:  4/11/2023  9:00 PM    PCP:   Mark Myles  Code Status:  Full Code    Subjective:     C/C:   Chief Complaint   Patient presents with    Irregular Heart Beat     SVT    Hyperglycemia     The patient has been seen at the request of the critical care service for assumption of care upon transfer out of ICU    Interval History Status:   Improved  Up to chair  Feels good  Requesting Percocet for left flank pain which she attributes to strain from nausea and vomiting prior to admission  No palpitations  Patient hoping to go home    Data Base Updates:  Telemetry reveals normal sinus rhythm    Blood sugars more stable  [id number] High   They have ranged from      UEM57xg/dL Creatinine0.82     WBC4.4k/uL RBC3.79 Low m/uL Nsvgenndlq73.0             Brief History:     As documented in the medical record:  \"Presented to ER after patient checked her blood glucose and was 509 per
PULMONARY & CRITICAL CARE MEDICINE PROGRESS NOTE     Patient:  Jarocho Schreiber  MRN: [id number]  Admit date: 4/11/2023  Primary Care Physician: Desmond Del Angel Dr Physician: Lupe Miller DO  CODE Status: Full Code  LOS: 3     SUBJECTIVE     Chief Complaint/ Reason for consult: ICU follow-up. Hospital Course: The patient is a 36 y.o. female past medical history of DVT/PE s/p IVC filter, type II DM, SVT presented to ER with feeling unwell admitted with DKA and SVT. Was managed in the ICU with insulin drip and IV fluid. SVT was managed with Cardizem later transition to oral.  Transferred out of the ICU on 4/13. She is not a smoker. Does not follow with any pulmonologist.    Interval History:  04/14/23  Pt was seen and examined at bedside. Sitting on a chair currently on room air. No new complaints. Hemodynamically stable with /89 with heart rate 78. Resting comfortably in the bed. Saturating appropriately at   Vitals stable. Labs reviewed. WBC 4.4, hemoglobin 12, platelet 027  No acute events overnight. Review Of Systems:  Review of Systems   Constitutional:  Negative for activity change, appetite change and fatigue. Eyes:  Negative for discharge. Respiratory:  Negative for cough, chest tightness and shortness of breath. Cardiovascular:  Negative for chest pain, palpitations and leg swelling. Gastrointestinal:  Negative for nausea and vomiting. Genitourinary:  Negative for difficulty urinating. Musculoskeletal:  Negative for back pain and neck pain. Skin:  Negative for color change and rash. Neurological:  Negative for weakness and light-headedness. Psychiatric/Behavioral:  Negative for behavioral problems and decreased concentration.         OBJECTIVE     PaO2/FiO2 RATIO:  Recent Labs     04/12/23  0433   POCPO2 102.9            VITAL SIGNS:   LAST:  /89   Pulse 78   Temp 97.5 °F (36.4 °C) (Oral)   Resp 16   Ht 5' 8\" (1.727 m)   Wt 275 lb 2.2 oz
Patient discharged, AVS given and reviewed, all questions answered, Ivs removed, Patient wheeled out to her ride.
Patient is requesting something else for pain. She did receive the flexeril, ativan, and trazodone at 2045 as well as her scheduled meds. Notified NP via perfect serve. New order placed for toradol. Will continue to monitor.
Patient stated she was told by Dr. Nba Bunn she could receive a one time dose of morphine with her prn ativan at bedtime. Notified NP via perfect serve. Instructed to give prn flexeril and trazodone with ativan and scheduled meds. Will continue to monitor.
[facility]   Progress Note                   Date:   4/14/2023  Patient name: Jarocho Schreiber  Date of admission:  4/11/2023  9:00 PM  MRN:   [id number]  YOB: 1980  PCP: Ekta Dan    Reason for Admission:  Irregular heart beat (SVT)  Subjective:   Seen & examined in room. Tx to step-down bed. No acute CV issues/concerns overnight. Labs, vitals, & tele reviewed. Medications:   Scheduled Meds:   oxyCODONE-acetaminophen  1 tablet Oral Once    lidocaine  1 patch TransDERmal Daily    insulin lispro  0-8 Units SubCUTAneous TID WC    insulin lispro  0-4 Units SubCUTAneous Nightly    benztropine  1 mg Oral BID    FLUoxetine  40 mg Oral Daily    fondaparinux  10 mg SubCUTAneous Daily    pravastatin  40 mg Oral Nightly    OLANZapine  15 mg Oral Nightly    thiamine and folic acid IVPB   IntraVENous Daily    multivitamin  1 tablet Oral Daily    pantoprazole  40 mg Oral QAM AC    metoprolol tartrate  50 mg Oral BID    insulin glargine  12 Units SubCUTAneous Daily       Continuous Infusions:   dextrose      sodium chloride Stopped (04/13/23 0442)    dilTIAZem Stopped (04/12/23 2205)       CBC:   Recent Labs     04/11/23 2123 04/13/23  0445 04/13/23  0938 04/14/23  0540   WBC 6.9 4.9  --  4.4   HGB 14.0 11.8* 11.4* 12.0    200  --  164       BMP:    Recent Labs     04/12/23  1757 04/13/23  0445 04/14/23  0540    139 135   K 4.2 3.9 4.2    110* 107   CO2 20 22 20   BUN 8 9 11   CREATININE 0.79 0.58 0.82   GLUCOSE 208* 164* 158*       Hepatic:   Recent Labs     04/11/23 2123   AST 55*   ALT 49*   BILITOT 0.4   ALKPHOS 239*       Troponin: No results for input(s): TROPONINI in the last 72 hours. BNP: No results for input(s): BNP in the last 72 hours. Lipids: No results for input(s): CHOL, HDL in the last 72 hours.     Invalid input(s): LDLCALCU  INR:   Recent Labs     04/11/23 2123   INR 1.0         Objective:   Vitals: /89   Pulse 78   Temp 97.5 °F (36.4
Pt transferred to 2c room 237 patient has phone,  dentures and clothes
TM's and external ear canals normal  Nose - normal and patent, no erythema, discharge or polyps  Mouth - mucous membranes moist, pharynx normal without lesions  Neck - supple, no significant adenopathy  Chest - clear to auscultation, no wheezes, rales or rhonchi, symmetric air entry  Heart - normal rate, regular rhythm, normal S1, S2, no murmurs, rubs, clicks or gallops  Abdomen - soft, nontender, nondistended, no masses or organomegaly  Neurological - alert, oriented, normal speech, no focal findings or movement disorder noted}  Extremities - peripheral pulses normal, no pedal edema, no clubbing or cyanosis  Skin - normal coloration and turgor, no rashes, no suspicious skin lesions noted      Any additional physical findings:    MEDICATIONS:    Scheduled Meds:   benztropine  1 mg Oral BID    FLUoxetine  40 mg Oral Daily    folic acid  1 mg Oral Daily    fondaparinux  10 mg SubCUTAneous Daily    pravastatin  40 mg Oral Nightly    OLANZapine  15 mg Oral Nightly    pantoprazole (PROTONIX) 40 mg injection  40 mg IntraVENous Daily    thiamine and folic acid IVPB   IntraVENous Daily    multivitamin  1 tablet Oral Daily     Continuous Infusions:   sodium chloride Stopped (04/12/23 0606)    sodium chloride      insulin Stopped (04/12/23 0539)    dilTIAZem 5 mg/hr (04/12/23 0753)    IV infusion builder 200 mL/hr at 04/12/23 0300     PRN Meds:   traZODone, 100 mg, Nightly PRN  sodium chloride, , PRN  ondansetron, 4 mg, Q8H PRN   Or  ondansetron, 4 mg, Q6H PRN  polyethylene glycol, 17 g, Daily PRN  acetaminophen, 650 mg, Q6H PRN   Or  acetaminophen, 650 mg, Q6H PRN  dextrose bolus, 125 mL, PRN   Or  dextrose bolus, 250 mL, PRN  magnesium sulfate, 1,000 mg, PRN  sodium chloride flush, 5-40 mL, PRN  LORazepam, 1 mg, Q1H PRN   Or  LORazepam, 1 mg, Q1H PRN   Or  LORazepam, 2 mg, Q1H PRN   Or  LORazepam, 2 mg, Q1H PRN   Or  LORazepam, 3 mg, Q1H PRN   Or  LORazepam, 3 mg, Q1H PRN   Or  LORazepam, 4 mg, Q1H PRN   Or  LORazepam, 4

## 2023-04-14 NOTE — PLAN OF CARE
Problem: Discharge Planning  Goal: Discharge to home or other facility with appropriate resources  4/14/2023 1531 by Morgan Longoria RN  Outcome: Progressing  4/14/2023 0330 by Neha Drummond RN  Outcome: Progressing     Problem: Pain  Goal: Verbalizes/displays adequate comfort level or baseline comfort level  4/14/2023 1531 by Morgan Longoria RN  Outcome: Progressing  4/14/2023 0330 by Neha Drummond RN  Outcome: Progressing  Flowsheets (Taken 4/13/2023 1515 by Simran Maldonado RN)  Verbalizes/displays adequate comfort level or baseline comfort level:   Encourage patient to monitor pain and request assistance   Assess pain using appropriate pain scale   Administer analgesics based on type and severity of pain and evaluate response     Problem: Skin/Tissue Integrity  Goal: Absence of new skin breakdown  Description: 1. Monitor for areas of redness and/or skin breakdown  2. Assess vascular access sites hourly  3. Every 4-6 hours minimum:  Change oxygen saturation probe site  4. Every 4-6 hours:  If on nasal continuous positive airway pressure, respiratory therapy assess nares and determine need for appliance change or resting period.   4/14/2023 1531 by Morgan Longoria RN  Outcome: Progressing  4/14/2023 0330 by Neha Drummond RN  Outcome: Progressing     Problem: Safety - Adult  Goal: Free from fall injury  4/14/2023 0330 by Neha Drummond RN  Outcome: Progressing     Problem: ABCDS Injury Assessment  Goal: Absence of physical injury  4/14/2023 0330 by Neha Drummond RN  Outcome: Progressing     Problem: Chronic Conditions and Co-morbidities  Goal: Patient's chronic conditions and co-morbidity symptoms are monitored and maintained or improved  4/14/2023 0330 by Neha Drummond RN  Outcome: Progressing

## 2023-04-15 NOTE — DISCHARGE SUMMARY
above including hepatic steatosis. XR CHEST PORTABLE    Result Date: 4/11/2023  EXAMINATION: ONE XRAY VIEW OF THE CHEST 4/11/2023 9:27 pm COMPARISON: November 24, 2022 HISTORY: ORDERING SYSTEM PROVIDED HISTORY: SOB TECHNOLOGIST PROVIDED HISTORY: SOB FINDINGS: The lungs are without acute focal process. There is no effusion or pneumothorax. The cardiomediastinal silhouette is without acute process. The osseous structures are without acute process. No acute process. Consultations:    Consults:     Final Specialist Recommendations/Findings:   IP CONSULT TO CRITICAL CARE  IP CONSULT TO SOCIAL WORK  IP CONSULT TO DIABETES EDUCATOR  IP CONSULT TO CARDIOLOGY  IP CONSULT TO ANESTHESIOLOGY  IP CONSULT TO IV TEAM        Discharged Condition:    Stable     Disposition: Home    Physician Follow Up:   [facility] DIABETES EDUCATION  [address] [id number] [phone]  Call  Follow up education on diabetes self management    [facility]  [address] [facility]. [address] [place]  Schedule an appointment as soon as possible for a visit in 3 week(s)  for hospital follow-up with cardiology       Activity:  activity as tolerated    Diet:  cardiac diet and diabetic diet     Discharge Medications:      Medication List        START taking these medications      metoprolol tartrate 50 MG tablet  Commonly known as: LOPRESSOR  Take 1 tablet by mouth 2 times daily            CONTINUE taking these medications      acetaminophen 500 MG tablet  Commonly known as: Tylenol  Take 2 tablets by mouth every 8 hours as needed for Pain     benztropine 1 MG tablet  Commonly known as: COGENTIN  Take 1 tablet by mouth 2 times daily     blood glucose test strips  Testing twice a day. Please dispense according to patients insurance.      bumetanide 1 MG tablet  Commonly known as: BUMEX  Take 1 tablet by mouth daily     dilTIAZem 360 MG extended release

## 2023-04-17 ENCOUNTER — HOSPITAL ENCOUNTER (INPATIENT)
Age: 43
LOS: 2 days | Discharge: HOME | DRG: 281 | End: 2023-04-19
Attending: EMERGENCY MEDICINE | Admitting: STUDENT IN AN ORGANIZED HEALTH CARE EDUCATION/TRAINING PROGRAM
Payer: MEDICARE

## 2023-04-17 ENCOUNTER — APPOINTMENT (OUTPATIENT)
Dept: GENERAL RADIOLOGY | Age: 43
DRG: 281 | End: 2023-04-17
Payer: MEDICARE

## 2023-04-17 DIAGNOSIS — S39.012D STRAIN OF LUMBAR REGION, SUBSEQUENT ENCOUNTER: ICD-10-CM

## 2023-04-17 DIAGNOSIS — R79.89 ELEVATED LACTIC ACID LEVEL: ICD-10-CM

## 2023-04-17 DIAGNOSIS — I47.1 PAROXYSMAL SUPRAVENTRICULAR TACHYCARDIA (HCC): Primary | ICD-10-CM

## 2023-04-17 DIAGNOSIS — R73.9 HYPERGLYCEMIA: ICD-10-CM

## 2023-04-17 PROBLEM — I49.9 ARRHYTHMIA: Status: ACTIVE | Noted: 2023-04-17

## 2023-04-17 LAB
ABSOLUTE EOS #: <0.03 K/UL (ref 0–0.44)
ABSOLUTE IMMATURE GRANULOCYTE: 0.04 K/UL (ref 0–0.3)
ABSOLUTE LYMPH #: 1.3 K/UL (ref 1.1–3.7)
ABSOLUTE MONO #: 0.49 K/UL (ref 0.1–1.2)
ANION GAP SERPL CALCULATED.3IONS-SCNC: 17 MMOL/L (ref 9–17)
BASOPHILS # BLD: 0 % (ref 0–2)
BASOPHILS ABSOLUTE: 0.04 K/UL (ref 0–0.2)
BETA-HYDROXYBUTYRATE: 0.11 MMOL/L (ref 0.02–0.27)
BNP SERPL-MCNC: 1479 PG/ML
BUN SERPL-MCNC: 11 MG/DL (ref 6–20)
BUN/CREAT BLD: 13 (ref 9–20)
CALCIUM SERPL-MCNC: 7.3 MG/DL (ref 8.6–10.4)
CHLORIDE SERPL-SCNC: 104 MMOL/L (ref 98–107)
CO2 SERPL-SCNC: 14 MMOL/L (ref 20–31)
CREAT SERPL-MCNC: 0.87 MG/DL (ref 0.5–0.9)
EOSINOPHILS RELATIVE PERCENT: 0 % (ref 1–4)
GFR SERPL CREATININE-BSD FRML MDRD: >60 ML/MIN/1.73M2
GLUCOSE BLD-MCNC: 131 MG/DL (ref 65–105)
GLUCOSE BLD-MCNC: 180 MG/DL (ref 65–105)
GLUCOSE SERPL-MCNC: 461 MG/DL (ref 70–99)
HCO3 VENOUS: 15.7 MMOL/L (ref 22–29)
HCT VFR BLD AUTO: 42.6 % (ref 36.3–47.1)
HGB BLD-MCNC: 12.7 G/DL (ref 11.9–15.1)
IMMATURE GRANULOCYTES: 0 %
LACTATE PLASV-SCNC: 5.5 MMOL/L (ref 0.5–2.2)
LACTATE PLASV-SCNC: 6 MMOL/L (ref 0.5–2.2)
LYMPHOCYTES # BLD: 13 % (ref 24–43)
MCH RBC QN AUTO: 30.8 PG (ref 25.2–33.5)
MCHC RBC AUTO-ENTMCNC: 29.8 G/DL (ref 28.4–34.8)
MCV RBC AUTO: 103.1 FL (ref 82.6–102.9)
MICROORGANISM SPEC CULT: NORMAL
MICROORGANISM SPEC CULT: NORMAL
MONOCYTES # BLD: 5 % (ref 3–12)
NEGATIVE BASE EXCESS, VEN: 8 (ref 0–2)
NRBC AUTOMATED: 0 PER 100 WBC
O2 SAT, VEN: 99 % (ref 60–85)
PCO2, VEN: 27.3 MM HG (ref 41–51)
PDW BLD-RTO: 15.9 % (ref 11.8–14.4)
PH VENOUS: 7.37 (ref 7.32–7.43)
PLATELET # BLD AUTO: 305 K/UL (ref 138–453)
PMV BLD AUTO: 10.7 FL (ref 8.1–13.5)
PO2, VEN: 131.2 MM HG (ref 30–50)
POTASSIUM SERPL-SCNC: 3.7 MMOL/L (ref 3.7–5.3)
RBC # BLD: 4.13 M/UL (ref 3.95–5.11)
RBC # BLD: ABNORMAL 10*6/UL
RBC # BLD: ABNORMAL 10*6/UL
SEG NEUTROPHILS: 82 % (ref 36–65)
SEGMENTED NEUTROPHILS ABSOLUTE COUNT: 8.42 K/UL (ref 1.5–8.1)
SERVICE CMNT-IMP: NORMAL
SERVICE CMNT-IMP: NORMAL
SODIUM SERPL-SCNC: 135 MMOL/L (ref 135–144)
SPECIMEN DESCRIPTION: NORMAL
SPECIMEN DESCRIPTION: NORMAL
TROPONIN I SERPL DL<=0.01 NG/ML-MCNC: 23 NG/L (ref 0–14)
TROPONIN I SERPL DL<=0.01 NG/ML-MCNC: 26 NG/L (ref 0–14)
TROPONIN I SERPL DL<=0.01 NG/ML-MCNC: 29 NG/L (ref 0–14)
WBC # BLD AUTO: 10.3 K/UL (ref 3.5–11.3)

## 2023-04-17 PROCEDURE — 6360000002 HC RX W HCPCS

## 2023-04-17 PROCEDURE — 36415 COLL VENOUS BLD VENIPUNCTURE: CPT

## 2023-04-17 PROCEDURE — 96361 HYDRATE IV INFUSION ADD-ON: CPT

## 2023-04-17 PROCEDURE — 82803 BLOOD GASES ANY COMBINATION: CPT

## 2023-04-17 PROCEDURE — 2060000000 HC ICU INTERMEDIATE R&B

## 2023-04-17 PROCEDURE — 2580000003 HC RX 258: Performed by: NURSE PRACTITIONER

## 2023-04-17 PROCEDURE — 83880 ASSAY OF NATRIURETIC PEPTIDE: CPT

## 2023-04-17 PROCEDURE — 6360000002 HC RX W HCPCS: Performed by: NURSE PRACTITIONER

## 2023-04-17 PROCEDURE — 2580000003 HC RX 258: Performed by: EMERGENCY MEDICINE

## 2023-04-17 PROCEDURE — 71045 X-RAY EXAM CHEST 1 VIEW: CPT

## 2023-04-17 PROCEDURE — 6370000000 HC RX 637 (ALT 250 FOR IP): Performed by: EMERGENCY MEDICINE

## 2023-04-17 PROCEDURE — 84484 ASSAY OF TROPONIN QUANT: CPT

## 2023-04-17 PROCEDURE — 82947 ASSAY GLUCOSE BLOOD QUANT: CPT

## 2023-04-17 PROCEDURE — 93005 ELECTROCARDIOGRAM TRACING: CPT | Performed by: EMERGENCY MEDICINE

## 2023-04-17 PROCEDURE — 83605 ASSAY OF LACTIC ACID: CPT

## 2023-04-17 PROCEDURE — 6370000000 HC RX 637 (ALT 250 FOR IP): Performed by: NURSE PRACTITIONER

## 2023-04-17 PROCEDURE — 80048 BASIC METABOLIC PNL TOTAL CA: CPT

## 2023-04-17 PROCEDURE — 96374 THER/PROPH/DIAG INJ IV PUSH: CPT

## 2023-04-17 PROCEDURE — 85025 COMPLETE CBC W/AUTO DIFF WBC: CPT

## 2023-04-17 PROCEDURE — 99222 1ST HOSP IP/OBS MODERATE 55: CPT | Performed by: NURSE PRACTITIONER

## 2023-04-17 PROCEDURE — 99285 EMERGENCY DEPT VISIT HI MDM: CPT

## 2023-04-17 PROCEDURE — 96375 TX/PRO/DX INJ NEW DRUG ADDON: CPT

## 2023-04-17 PROCEDURE — 82010 KETONE BODYS QUAN: CPT

## 2023-04-17 RX ORDER — ONDANSETRON 4 MG/1
4 TABLET, ORALLY DISINTEGRATING ORAL EVERY 8 HOURS PRN
Status: DISCONTINUED | OUTPATIENT
Start: 2023-04-17 | End: 2023-04-19 | Stop reason: HOSPADM

## 2023-04-17 RX ORDER — DEXTROSE MONOHYDRATE 100 MG/ML
INJECTION, SOLUTION INTRAVENOUS CONTINUOUS PRN
Status: DISCONTINUED | OUTPATIENT
Start: 2023-04-17 | End: 2023-04-19 | Stop reason: HOSPADM

## 2023-04-17 RX ORDER — M-VIT,TX,IRON,MINS/CALC/FOLIC 27MG-0.4MG
1 TABLET ORAL DAILY
Status: DISCONTINUED | OUTPATIENT
Start: 2023-04-18 | End: 2023-04-19 | Stop reason: HOSPADM

## 2023-04-17 RX ORDER — POTASSIUM CHLORIDE 7.45 MG/ML
10 INJECTION INTRAVENOUS
Status: ACTIVE | OUTPATIENT
Start: 2023-04-17 | End: 2023-04-17

## 2023-04-17 RX ORDER — FLUOXETINE HYDROCHLORIDE 20 MG/1
60 CAPSULE ORAL DAILY
Status: DISCONTINUED | OUTPATIENT
Start: 2023-04-18 | End: 2023-04-19 | Stop reason: HOSPADM

## 2023-04-17 RX ORDER — SODIUM CHLORIDE 0.9 % (FLUSH) 0.9 %
10 SYRINGE (ML) INJECTION PRN
Status: DISCONTINUED | OUTPATIENT
Start: 2023-04-17 | End: 2023-04-19 | Stop reason: HOSPADM

## 2023-04-17 RX ORDER — PRAVASTATIN SODIUM 40 MG
40 TABLET ORAL NIGHTLY
Status: DISCONTINUED | OUTPATIENT
Start: 2023-04-17 | End: 2023-04-19 | Stop reason: HOSPADM

## 2023-04-17 RX ORDER — VITAMIN B COMPLEX
1000 TABLET ORAL DAILY
Status: DISCONTINUED | OUTPATIENT
Start: 2023-04-18 | End: 2023-04-19 | Stop reason: HOSPADM

## 2023-04-17 RX ORDER — ADENOSINE 3 MG/ML
INJECTION, SOLUTION INTRAVENOUS
Status: COMPLETED
Start: 2023-04-17 | End: 2023-04-17

## 2023-04-17 RX ORDER — SODIUM CHLORIDE 9 MG/ML
INJECTION, SOLUTION INTRAVENOUS CONTINUOUS
Status: DISCONTINUED | OUTPATIENT
Start: 2023-04-17 | End: 2023-04-17

## 2023-04-17 RX ORDER — ONDANSETRON 2 MG/ML
4 INJECTION INTRAMUSCULAR; INTRAVENOUS EVERY 6 HOURS PRN
Status: DISCONTINUED | OUTPATIENT
Start: 2023-04-17 | End: 2023-04-19 | Stop reason: HOSPADM

## 2023-04-17 RX ORDER — 0.9 % SODIUM CHLORIDE 0.9 %
1000 INTRAVENOUS SOLUTION INTRAVENOUS ONCE
Status: COMPLETED | OUTPATIENT
Start: 2023-04-17 | End: 2023-04-18

## 2023-04-17 RX ORDER — HYDROCODONE BITARTRATE AND ACETAMINOPHEN 5; 325 MG/1; MG/1
1 TABLET ORAL ONCE
Status: COMPLETED | OUTPATIENT
Start: 2023-04-17 | End: 2023-04-17

## 2023-04-17 RX ORDER — ACETAMINOPHEN 650 MG/1
650 SUPPOSITORY RECTAL EVERY 6 HOURS PRN
Status: DISCONTINUED | OUTPATIENT
Start: 2023-04-17 | End: 2023-04-19 | Stop reason: HOSPADM

## 2023-04-17 RX ORDER — METOPROLOL TARTRATE 50 MG/1
50 TABLET, FILM COATED ORAL 2 TIMES DAILY
Status: DISCONTINUED | OUTPATIENT
Start: 2023-04-17 | End: 2023-04-18

## 2023-04-17 RX ORDER — LORAZEPAM 2 MG/ML
1 INJECTION INTRAMUSCULAR ONCE
Status: COMPLETED | OUTPATIENT
Start: 2023-04-17 | End: 2023-04-17

## 2023-04-17 RX ORDER — BUMETANIDE 1 MG/1
1 TABLET ORAL DAILY
Status: DISCONTINUED | OUTPATIENT
Start: 2023-04-18 | End: 2023-04-19 | Stop reason: HOSPADM

## 2023-04-17 RX ORDER — POTASSIUM CHLORIDE 7.45 MG/ML
10 INJECTION INTRAVENOUS PRN
Status: DISCONTINUED | OUTPATIENT
Start: 2023-04-17 | End: 2023-04-17 | Stop reason: SDUPTHER

## 2023-04-17 RX ORDER — NITROGLYCERIN 0.4 MG/1
0.4 TABLET SUBLINGUAL EVERY 5 MIN PRN
Status: DISCONTINUED | OUTPATIENT
Start: 2023-04-17 | End: 2023-04-19 | Stop reason: HOSPADM

## 2023-04-17 RX ORDER — INSULIN LISPRO 100 [IU]/ML
0-4 INJECTION, SOLUTION INTRAVENOUS; SUBCUTANEOUS NIGHTLY
Status: DISCONTINUED | OUTPATIENT
Start: 2023-04-17 | End: 2023-04-19 | Stop reason: HOSPADM

## 2023-04-17 RX ORDER — BENZTROPINE MESYLATE 1 MG/1
1 TABLET ORAL 2 TIMES DAILY
Status: DISCONTINUED | OUTPATIENT
Start: 2023-04-17 | End: 2023-04-19 | Stop reason: HOSPADM

## 2023-04-17 RX ORDER — INSULIN GLARGINE 100 [IU]/ML
24 INJECTION, SOLUTION SUBCUTANEOUS DAILY
Status: DISCONTINUED | OUTPATIENT
Start: 2023-04-18 | End: 2023-04-19 | Stop reason: HOSPADM

## 2023-04-17 RX ORDER — FOLIC ACID 1 MG/1
1 TABLET ORAL DAILY
Status: DISCONTINUED | OUTPATIENT
Start: 2023-04-18 | End: 2023-04-19 | Stop reason: HOSPADM

## 2023-04-17 RX ORDER — SODIUM CHLORIDE 9 MG/ML
INJECTION, SOLUTION INTRAVENOUS CONTINUOUS
Status: DISCONTINUED | OUTPATIENT
Start: 2023-04-17 | End: 2023-04-18

## 2023-04-17 RX ORDER — POTASSIUM CHLORIDE 20 MEQ/1
20 TABLET, EXTENDED RELEASE ORAL ONCE
Status: COMPLETED | OUTPATIENT
Start: 2023-04-17 | End: 2023-04-17

## 2023-04-17 RX ORDER — POTASSIUM CHLORIDE 20 MEQ/1
40 TABLET, EXTENDED RELEASE ORAL PRN
Status: DISCONTINUED | OUTPATIENT
Start: 2023-04-17 | End: 2023-04-19 | Stop reason: HOSPADM

## 2023-04-17 RX ORDER — SODIUM CHLORIDE 9 MG/ML
INJECTION, SOLUTION INTRAVENOUS PRN
Status: DISCONTINUED | OUTPATIENT
Start: 2023-04-17 | End: 2023-04-19 | Stop reason: HOSPADM

## 2023-04-17 RX ORDER — LORAZEPAM 1 MG/1
2 TABLET ORAL 2 TIMES DAILY PRN
Status: DISCONTINUED | OUTPATIENT
Start: 2023-04-17 | End: 2023-04-19 | Stop reason: HOSPADM

## 2023-04-17 RX ORDER — INSULIN LISPRO 100 [IU]/ML
0-8 INJECTION, SOLUTION INTRAVENOUS; SUBCUTANEOUS
Status: DISCONTINUED | OUTPATIENT
Start: 2023-04-18 | End: 2023-04-19 | Stop reason: HOSPADM

## 2023-04-17 RX ORDER — MAGNESIUM SULFATE 1 G/100ML
1000 INJECTION INTRAVENOUS PRN
Status: DISCONTINUED | OUTPATIENT
Start: 2023-04-17 | End: 2023-04-19 | Stop reason: HOSPADM

## 2023-04-17 RX ORDER — ADENOSINE 3 MG/ML
INJECTION, SOLUTION INTRAVENOUS
Status: DISPENSED
Start: 2023-04-17 | End: 2023-04-18

## 2023-04-17 RX ORDER — LORAZEPAM 1 MG/1
1 TABLET ORAL NIGHTLY
Status: ON HOLD | COMMUNITY

## 2023-04-17 RX ORDER — ENOXAPARIN SODIUM 100 MG/ML
40 INJECTION SUBCUTANEOUS DAILY
Status: DISCONTINUED | OUTPATIENT
Start: 2023-04-18 | End: 2023-04-18

## 2023-04-17 RX ORDER — 0.9 % SODIUM CHLORIDE 0.9 %
500 INTRAVENOUS SOLUTION INTRAVENOUS ONCE
Status: COMPLETED | OUTPATIENT
Start: 2023-04-17 | End: 2023-04-17

## 2023-04-17 RX ORDER — OLANZAPINE 5 MG/1
15 TABLET ORAL NIGHTLY
Status: DISCONTINUED | OUTPATIENT
Start: 2023-04-17 | End: 2023-04-19 | Stop reason: HOSPADM

## 2023-04-17 RX ORDER — DILTIAZEM HYDROCHLORIDE 180 MG/1
360 CAPSULE, COATED, EXTENDED RELEASE ORAL DAILY
Status: DISCONTINUED | OUTPATIENT
Start: 2023-04-18 | End: 2023-04-19 | Stop reason: HOSPADM

## 2023-04-17 RX ORDER — LORAZEPAM 1 MG/1
1 TABLET ORAL ONCE
Status: COMPLETED | OUTPATIENT
Start: 2023-04-17 | End: 2023-04-17

## 2023-04-17 RX ORDER — TRAZODONE HYDROCHLORIDE 100 MG/1
100 TABLET ORAL NIGHTLY PRN
Status: DISCONTINUED | OUTPATIENT
Start: 2023-04-17 | End: 2023-04-19 | Stop reason: HOSPADM

## 2023-04-17 RX ORDER — ACETAMINOPHEN 325 MG/1
650 TABLET ORAL EVERY 6 HOURS PRN
Status: DISCONTINUED | OUTPATIENT
Start: 2023-04-17 | End: 2023-04-19 | Stop reason: HOSPADM

## 2023-04-17 RX ORDER — POTASSIUM CHLORIDE 7.45 MG/ML
10 INJECTION INTRAVENOUS PRN
Status: DISCONTINUED | OUTPATIENT
Start: 2023-04-17 | End: 2023-04-19 | Stop reason: HOSPADM

## 2023-04-17 RX ORDER — FLUOXETINE HYDROCHLORIDE 20 MG/1
40 CAPSULE ORAL DAILY
Status: DISCONTINUED | OUTPATIENT
Start: 2023-04-18 | End: 2023-04-17

## 2023-04-17 RX ORDER — SODIUM CHLORIDE 0.9 % (FLUSH) 0.9 %
5-40 SYRINGE (ML) INJECTION EVERY 12 HOURS SCHEDULED
Status: DISCONTINUED | OUTPATIENT
Start: 2023-04-17 | End: 2023-04-19 | Stop reason: HOSPADM

## 2023-04-17 RX ADMIN — LORAZEPAM 1 MG: 2 INJECTION INTRAMUSCULAR; INTRAVENOUS at 23:28

## 2023-04-17 RX ADMIN — BENZTROPINE MESYLATE 1 MG: 1 TABLET ORAL at 23:28

## 2023-04-17 RX ADMIN — SODIUM CHLORIDE 1000 ML: 9 INJECTION, SOLUTION INTRAVENOUS at 23:02

## 2023-04-17 RX ADMIN — ONDANSETRON 4 MG: 2 INJECTION INTRAMUSCULAR; INTRAVENOUS at 23:04

## 2023-04-17 RX ADMIN — HYDROCODONE BITARTRATE AND ACETAMINOPHEN 1 TABLET: 5; 325 TABLET ORAL at 19:20

## 2023-04-17 RX ADMIN — SODIUM CHLORIDE, PRESERVATIVE FREE 10 ML: 5 INJECTION INTRAVENOUS at 23:28

## 2023-04-17 RX ADMIN — OLANZAPINE 15 MG: 5 TABLET, FILM COATED ORAL at 23:28

## 2023-04-17 RX ADMIN — POTASSIUM CHLORIDE 20 MEQ: 1500 TABLET, EXTENDED RELEASE ORAL at 19:58

## 2023-04-17 RX ADMIN — ADENOSINE 6 MG: 3 INJECTION INTRAVENOUS at 18:41

## 2023-04-17 RX ADMIN — SODIUM CHLORIDE 500 ML: 9 INJECTION, SOLUTION INTRAVENOUS at 19:20

## 2023-04-17 RX ADMIN — METOPROLOL TARTRATE 50 MG: 50 TABLET, FILM COATED ORAL at 23:28

## 2023-04-17 RX ADMIN — LORAZEPAM 1 MG: 1 TABLET ORAL at 20:22

## 2023-04-17 RX ADMIN — INSULIN HUMAN 10 UNITS: 100 INJECTION, SOLUTION PARENTERAL at 20:41

## 2023-04-17 ASSESSMENT — PAIN DESCRIPTION - PAIN TYPE: TYPE: ACUTE PAIN

## 2023-04-17 ASSESSMENT — PAIN DESCRIPTION - LOCATION
LOCATION: CHEST

## 2023-04-17 ASSESSMENT — PAIN SCALES - GENERAL
PAINLEVEL_OUTOF10: 6
PAINLEVEL_OUTOF10: 6
PAINLEVEL_OUTOF10: 2

## 2023-04-17 ASSESSMENT — PAIN - FUNCTIONAL ASSESSMENT: PAIN_FUNCTIONAL_ASSESSMENT: 0-10

## 2023-04-17 ASSESSMENT — PAIN DESCRIPTION - FREQUENCY: FREQUENCY: CONTINUOUS

## 2023-04-17 ASSESSMENT — PAIN DESCRIPTION - DESCRIPTORS: DESCRIPTORS: PRESSURE

## 2023-04-17 NOTE — ED PROVIDER NOTES
EMERGENCY DEPARTMENT ENCOUNTER    Pt Name: Teri Gaucher  MRN: [id number]  [place] 1980  Date of evaluation: 4/17/23  CHIEF COMPLAINT       Chief Complaint   Patient presents with    Chest Pain     Pressure, HR sustained in the 180's      HISTORY OF PRESENT ILLNESS   44-year-old female presents emergency room by squad for palpitations and elevated heart rate. Squad brought in patient with a heart rate in the 180s. She has had several episodes of SVT in the last week. She does not have cardiac history that she is aware of. She reports her heart rate is making her very anxious. EMS had difficulty getting an IV in route due to episode of hypotension they did shock her. Upon arrival to the ED she is still in SVT. She is complaining of chest discomfort from the shock. EMR records report recent hospitalization with DKA and SVT. Patient does have history of alcohol misuse and heavy benzodiazepine use. REVIEW OF SYSTEMS     Review of Systems   Cardiovascular:  Positive for chest pain and palpitations. Psychiatric/Behavioral:  The patient is nervous/anxious.     PASTMEDICAL HISTORY     Past Medical History:   Diagnosis Date    Alcohol abuse     sober [id number]    Anxiety     Arthritis     Painting esophagus     Benzodiazepine overdose 9/26/2015    Bipolar disorder, unspecified ([address]     Bipolar I disorder, most recent episode depressed, severe without psychotic features ([address]     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

## 2023-04-17 NOTE — ED TRIAGE NOTES
Pt comes to the ED via EMS w/ c/o chest pressure and a sustained HR in the 180's. This is the 3rd time this has happened recently and pt has been cardioverted 3x. EMS attempted to cardiovert enroute w/o success, pt arrives refusing additional attempts due to the pain. Pt reports that she has been taking her medications as directed and does not know what precipitates these episodes. Patient changed into gown, PIV placed, labs collected and labeled at bedside and an ekg was obtained. Pt has been placed on continuous telemetry monitoring.

## 2023-04-18 LAB
ANION GAP SERPL CALCULATED.3IONS-SCNC: 7 MMOL/L (ref 9–17)
BUN SERPL-MCNC: 8 MG/DL (ref 6–20)
BUN/CREAT BLD: 13 (ref 9–20)
CA-I BLD-SCNC: 1.15 MMOL/L (ref 1.13–1.33)
CALCIUM SERPL-MCNC: 7.2 MG/DL (ref 8.6–10.4)
CHLORIDE SERPL-SCNC: 110 MMOL/L (ref 98–107)
CHOLEST SERPL-MCNC: 74 MG/DL
CHOLESTEROL/HDL RATIO: 2.1
CO2 SERPL-SCNC: 26 MMOL/L (ref 20–31)
CREAT SERPL-MCNC: 0.6 MG/DL (ref 0.5–0.9)
EKG ATRIAL RATE: 134 BPM
EKG ATRIAL RATE: 85 BPM
EKG ATRIAL RATE: 97 BPM
EKG P AXIS: 52 DEGREES
EKG P AXIS: 58 DEGREES
EKG P-R INTERVAL: 132 MS
EKG P-R INTERVAL: 138 MS
EKG Q-T INTERVAL: 266 MS
EKG Q-T INTERVAL: 304 MS
EKG Q-T INTERVAL: 404 MS
EKG QRS DURATION: 76 MS
EKG QRS DURATION: 78 MS
EKG QRS DURATION: 82 MS
EKG QTC CALCULATION (BAZETT): 453 MS
EKG QTC CALCULATION (BAZETT): 464 MS
EKG QTC CALCULATION (BAZETT): 480 MS
EKG R AXIS: 22 DEGREES
EKG R AXIS: 49 DEGREES
EKG R AXIS: 52 DEGREES
EKG T AXIS: 40 DEGREES
EKG T AXIS: 61 DEGREES
EKG T AXIS: 81 DEGREES
EKG VENTRICULAR RATE: 134 BPM
EKG VENTRICULAR RATE: 183 BPM
EKG VENTRICULAR RATE: 85 BPM
GFR SERPL CREATININE-BSD FRML MDRD: >60 ML/MIN/1.73M2
GLUCOSE BLD-MCNC: 126 MG/DL (ref 65–105)
GLUCOSE BLD-MCNC: 128 MG/DL (ref 65–105)
GLUCOSE BLD-MCNC: 146 MG/DL (ref 65–105)
GLUCOSE BLD-MCNC: 229 MG/DL (ref 65–105)
GLUCOSE SERPL-MCNC: 120 MG/DL (ref 70–99)
HCT VFR BLD AUTO: 37.6 % (ref 36.3–47.1)
HDLC SERPL-MCNC: 36 MG/DL
HGB BLD-MCNC: 11.9 G/DL (ref 11.9–15.1)
LACTATE PLASV-SCNC: 1.9 MMOL/L (ref 0.5–2.2)
LDLC SERPL CALC-MCNC: 19 MG/DL (ref 0–130)
MAGNESIUM SERPL-MCNC: 1.3 MG/DL (ref 1.6–2.6)
MCH RBC QN AUTO: 31.3 PG (ref 25.2–33.5)
MCHC RBC AUTO-ENTMCNC: 31.6 G/DL (ref 28.4–34.8)
MCV RBC AUTO: 98.9 FL (ref 82.6–102.9)
NRBC AUTOMATED: 0 PER 100 WBC
PDW BLD-RTO: 16.2 % (ref 11.8–14.4)
PLATELET # BLD AUTO: 228 K/UL (ref 138–453)
PMV BLD AUTO: 12 FL (ref 8.1–13.5)
POTASSIUM SERPL-SCNC: 4.3 MMOL/L (ref 3.7–5.3)
RBC # BLD: 3.8 M/UL (ref 3.95–5.11)
REASON FOR REJECTION: NORMAL
SODIUM SERPL-SCNC: 143 MMOL/L (ref 135–144)
TRIGL SERPL-MCNC: 97 MG/DL
TROPONIN I SERPL DL<=0.01 NG/ML-MCNC: 16 NG/L (ref 0–14)
WBC # BLD AUTO: 7.1 K/UL (ref 3.5–11.3)
ZZ NTE CLEAN UP: ORDERED TEST: NORMAL
ZZ NTE WITH NAME CLEAN UP: SPECIMEN SOURCE: NORMAL

## 2023-04-18 PROCEDURE — 6370000000 HC RX 637 (ALT 250 FOR IP): Performed by: NURSE PRACTITIONER

## 2023-04-18 PROCEDURE — 2580000003 HC RX 258: Performed by: NURSE PRACTITIONER

## 2023-04-18 PROCEDURE — 84484 ASSAY OF TROPONIN QUANT: CPT

## 2023-04-18 PROCEDURE — 36415 COLL VENOUS BLD VENIPUNCTURE: CPT

## 2023-04-18 PROCEDURE — 93005 ELECTROCARDIOGRAM TRACING: CPT | Performed by: NURSE PRACTITIONER

## 2023-04-18 PROCEDURE — 6370000000 HC RX 637 (ALT 250 FOR IP): Performed by: INTERNAL MEDICINE

## 2023-04-18 PROCEDURE — 83735 ASSAY OF MAGNESIUM: CPT

## 2023-04-18 PROCEDURE — 2060000000 HC ICU INTERMEDIATE R&B

## 2023-04-18 PROCEDURE — 82947 ASSAY GLUCOSE BLOOD QUANT: CPT

## 2023-04-18 PROCEDURE — 99232 SBSQ HOSP IP/OBS MODERATE 35: CPT | Performed by: INTERNAL MEDICINE

## 2023-04-18 PROCEDURE — 6360000002 HC RX W HCPCS: Performed by: NURSE PRACTITIONER

## 2023-04-18 PROCEDURE — 6360000002 HC RX W HCPCS: Performed by: INTERNAL MEDICINE

## 2023-04-18 PROCEDURE — 80061 LIPID PANEL: CPT

## 2023-04-18 PROCEDURE — 80048 BASIC METABOLIC PNL TOTAL CA: CPT

## 2023-04-18 PROCEDURE — 83605 ASSAY OF LACTIC ACID: CPT

## 2023-04-18 PROCEDURE — 83036 HEMOGLOBIN GLYCOSYLATED A1C: CPT

## 2023-04-18 PROCEDURE — 82330 ASSAY OF CALCIUM: CPT

## 2023-04-18 PROCEDURE — 85027 COMPLETE CBC AUTOMATED: CPT

## 2023-04-18 PROCEDURE — 2500000003 HC RX 250 WO HCPCS: Performed by: INTERNAL MEDICINE

## 2023-04-18 RX ORDER — SODIUM CHLORIDE 9 MG/ML
INJECTION, SOLUTION INTRAVENOUS CONTINUOUS
Status: DISCONTINUED | OUTPATIENT
Start: 2023-04-18 | End: 2023-04-18

## 2023-04-18 RX ORDER — PANTOPRAZOLE SODIUM 40 MG/1
40 TABLET, DELAYED RELEASE ORAL DAILY PRN
Status: DISCONTINUED | OUTPATIENT
Start: 2023-04-18 | End: 2023-04-19 | Stop reason: HOSPADM

## 2023-04-18 RX ORDER — OXYCODONE HYDROCHLORIDE 5 MG/1
10 TABLET ORAL EVERY 4 HOURS PRN
Status: DISCONTINUED | OUTPATIENT
Start: 2023-04-18 | End: 2023-04-19 | Stop reason: HOSPADM

## 2023-04-18 RX ORDER — TOPIRAMATE 100 MG/1
100 TABLET, FILM COATED ORAL NIGHTLY
Status: ON HOLD | COMMUNITY

## 2023-04-18 RX ORDER — PANTOPRAZOLE SODIUM 40 MG/1
40 TABLET, DELAYED RELEASE ORAL DAILY PRN
Status: ON HOLD | COMMUNITY

## 2023-04-18 RX ORDER — DIPHENHYDRAMINE HCL 25 MG
25 TABLET ORAL ONCE
Status: COMPLETED | OUTPATIENT
Start: 2023-04-18 | End: 2023-04-18

## 2023-04-18 RX ORDER — VALACYCLOVIR HYDROCHLORIDE 500 MG/1
500 TABLET, FILM COATED ORAL DAILY
Status: ON HOLD | COMMUNITY

## 2023-04-18 RX ORDER — FAMOTIDINE 20 MG/1
20 TABLET, FILM COATED ORAL 2 TIMES DAILY
Status: DISCONTINUED | OUTPATIENT
Start: 2023-04-18 | End: 2023-04-19 | Stop reason: HOSPADM

## 2023-04-18 RX ORDER — BUPROPION HYDROCHLORIDE 300 MG/1
300 TABLET ORAL EVERY MORNING
Status: ON HOLD | COMMUNITY

## 2023-04-18 RX ORDER — CYCLOBENZAPRINE HCL 10 MG
10 TABLET ORAL 3 TIMES DAILY PRN
Status: ON HOLD | COMMUNITY

## 2023-04-18 RX ORDER — LORAZEPAM 1 MG/1
1 TABLET ORAL 2 TIMES DAILY
Status: DISCONTINUED | OUTPATIENT
Start: 2023-04-18 | End: 2023-04-19 | Stop reason: HOSPADM

## 2023-04-18 RX ORDER — VALACYCLOVIR HYDROCHLORIDE 500 MG/1
500 TABLET, FILM COATED ORAL DAILY
Status: DISCONTINUED | OUTPATIENT
Start: 2023-04-18 | End: 2023-04-19 | Stop reason: HOSPADM

## 2023-04-18 RX ORDER — ENOXAPARIN SODIUM 150 MG/ML
1 INJECTION SUBCUTANEOUS 2 TIMES DAILY
Status: DISCONTINUED | OUTPATIENT
Start: 2023-04-18 | End: 2023-04-19 | Stop reason: HOSPADM

## 2023-04-18 RX ORDER — TOPIRAMATE 100 MG/1
100 TABLET, FILM COATED ORAL NIGHTLY
Status: DISCONTINUED | OUTPATIENT
Start: 2023-04-18 | End: 2023-04-19 | Stop reason: HOSPADM

## 2023-04-18 RX ORDER — OXYCODONE HYDROCHLORIDE 5 MG/1
5 TABLET ORAL EVERY 4 HOURS PRN
Status: DISCONTINUED | OUTPATIENT
Start: 2023-04-18 | End: 2023-04-19 | Stop reason: HOSPADM

## 2023-04-18 RX ORDER — BUPROPION HYDROCHLORIDE 150 MG/1
300 TABLET ORAL EVERY MORNING
Status: DISCONTINUED | OUTPATIENT
Start: 2023-04-18 | End: 2023-04-19 | Stop reason: HOSPADM

## 2023-04-18 RX ADMIN — ENOXAPARIN SODIUM 120 MG: 150 INJECTION SUBCUTANEOUS at 20:41

## 2023-04-18 RX ADMIN — LORAZEPAM 2 MG: 1 TABLET ORAL at 09:40

## 2023-04-18 RX ADMIN — OLANZAPINE 15 MG: 5 TABLET, FILM COATED ORAL at 20:28

## 2023-04-18 RX ADMIN — FOLIC ACID 1 MG: 1 TABLET ORAL at 09:33

## 2023-04-18 RX ADMIN — VALACYCLOVIR HYDROCHLORIDE 500 MG: 500 TABLET, FILM COATED ORAL at 14:29

## 2023-04-18 RX ADMIN — METOPROLOL TARTRATE 50 MG: 50 TABLET, FILM COATED ORAL at 20:27

## 2023-04-18 RX ADMIN — ENOXAPARIN SODIUM 120 MG: 150 INJECTION SUBCUTANEOUS at 09:33

## 2023-04-18 RX ADMIN — ANTI-FUNGAL POWDER MICONAZOLE NITRATE TALC FREE: 1.42 POWDER TOPICAL at 15:17

## 2023-04-18 RX ADMIN — TOPIRAMATE 100 MG: 100 TABLET, FILM COATED ORAL at 20:28

## 2023-04-18 RX ADMIN — Medication 1000 UNITS: at 09:33

## 2023-04-18 RX ADMIN — MULTIPLE VITAMINS W/ MINERALS TAB 1 TABLET: TAB at 09:33

## 2023-04-18 RX ADMIN — MAGNESIUM SULFATE HEPTAHYDRATE 1000 MG: 1 INJECTION, SOLUTION INTRAVENOUS at 11:30

## 2023-04-18 RX ADMIN — OXYCODONE HYDROCHLORIDE 5 MG: 5 TABLET ORAL at 17:44

## 2023-04-18 RX ADMIN — FAMOTIDINE 20 MG: 20 TABLET, FILM COATED ORAL at 20:27

## 2023-04-18 RX ADMIN — SODIUM CHLORIDE: 9 INJECTION, SOLUTION INTRAVENOUS at 01:32

## 2023-04-18 RX ADMIN — SODIUM CHLORIDE, PRESERVATIVE FREE 10 ML: 5 INJECTION INTRAVENOUS at 20:28

## 2023-04-18 RX ADMIN — DILTIAZEM HYDROCHLORIDE 360 MG: 180 CAPSULE, COATED, EXTENDED RELEASE ORAL at 09:32

## 2023-04-18 RX ADMIN — FLUOXETINE 60 MG: 20 CAPSULE ORAL at 09:32

## 2023-04-18 RX ADMIN — BUPROPION HYDROCHLORIDE 300 MG: 150 TABLET, EXTENDED RELEASE ORAL at 14:29

## 2023-04-18 RX ADMIN — MAGNESIUM SULFATE HEPTAHYDRATE 1000 MG: 1 INJECTION, SOLUTION INTRAVENOUS at 07:37

## 2023-04-18 RX ADMIN — INSULIN GLARGINE 24 UNITS: 100 INJECTION, SOLUTION SUBCUTANEOUS at 09:33

## 2023-04-18 RX ADMIN — BENZTROPINE MESYLATE 1 MG: 1 TABLET ORAL at 20:28

## 2023-04-18 RX ADMIN — BENZTROPINE MESYLATE 1 MG: 1 TABLET ORAL at 09:33

## 2023-04-18 RX ADMIN — INSULIN LISPRO 2 UNITS: 100 INJECTION, SOLUTION INTRAVENOUS; SUBCUTANEOUS at 17:45

## 2023-04-18 RX ADMIN — OXYCODONE HYDROCHLORIDE 5 MG: 5 TABLET ORAL at 21:39

## 2023-04-18 RX ADMIN — MAGNESIUM SULFATE HEPTAHYDRATE 1000 MG: 1 INJECTION, SOLUTION INTRAVENOUS at 09:42

## 2023-04-18 RX ADMIN — SODIUM CHLORIDE, PRESERVATIVE FREE 10 ML: 5 INJECTION INTRAVENOUS at 09:42

## 2023-04-18 RX ADMIN — METOPROLOL TARTRATE 50 MG: 50 TABLET, FILM COATED ORAL at 09:32

## 2023-04-18 RX ADMIN — DIPHENHYDRAMINE HYDROCHLORIDE 25 MG: 25 TABLET ORAL at 15:16

## 2023-04-18 RX ADMIN — LORAZEPAM 1 MG: 1 TABLET ORAL at 20:27

## 2023-04-18 RX ADMIN — FAMOTIDINE 20 MG: 20 TABLET, FILM COATED ORAL at 09:32

## 2023-04-18 RX ADMIN — MAGNESIUM SULFATE HEPTAHYDRATE 1000 MG: 1 INJECTION, SOLUTION INTRAVENOUS at 06:24

## 2023-04-18 RX ADMIN — PRAVASTATIN SODIUM 40 MG: 40 TABLET ORAL at 20:28

## 2023-04-18 ASSESSMENT — ENCOUNTER SYMPTOMS
CONSTIPATION: 0
DIARRHEA: 0
PHOTOPHOBIA: 0
EYE DISCHARGE: 0
RESPIRATORY NEGATIVE: 1
BACK PAIN: 1
VOMITING: 0
ABDOMINAL PAIN: 0
NAUSEA: 1
EYE ITCHING: 0
EYE REDNESS: 0
EYE PAIN: 0

## 2023-04-18 ASSESSMENT — PAIN DESCRIPTION - ORIENTATION
ORIENTATION: RIGHT
ORIENTATION: RIGHT

## 2023-04-18 ASSESSMENT — PAIN SCALES - GENERAL
PAINLEVEL_OUTOF10: 6
PAINLEVEL_OUTOF10: 6

## 2023-04-18 ASSESSMENT — PAIN DESCRIPTION - LOCATION
LOCATION: FLANK
LOCATION: FLANK

## 2023-04-18 ASSESSMENT — PAIN DESCRIPTION - DESCRIPTORS: DESCRIPTORS: ACHING;BURNING

## 2023-04-18 NOTE — PLAN OF CARE
Problem: Discharge Planning  Goal: Discharge to home or other facility with appropriate resources  4/18/2023 1549 by Mehul Dillard RN  Outcome: Progressing     Problem: Safety - Adult  Goal: Free from fall injury  4/18/2023 1549 by Mehul Dillard RN  Outcome: Progressing     Problem: Chronic Conditions and Co-morbidities  Goal: Patient's chronic conditions and co-morbidity symptoms are monitored and maintained or improved  Outcome: Progressing     Problem: Cardiovascular - Adult  Goal: Absence of cardiac dysrhythmias or at baseline  Outcome: Progressing     Problem: Metabolic/Fluid and Electrolytes - Adult  Goal: Electrolytes maintained within normal limits  Outcome: Progressing

## 2023-04-18 NOTE — ED NOTES
Attempted to call report, nurse performing pt care- will call back.       Hiro Connolly, RN  04/17/23 2730

## 2023-04-18 NOTE — CARE COORDINATION
needed at discharge: N/A            Potential DME:    Patient expects to discharge to: [facility] for transportation at discharge:      Financial    Payor: Carlin Elias / Plan: MEDICARE PART A AND B / Product Type: *No Product type* /     Does insurance require precert for SNF: Yes    Potential assistance Purchasing Medications: No  Meds-to-Beds request: Yes      Peggy Godfrey [address] [facility] [phone] James Borrego [phone] [address] [facility] [address]  Phone: [phone] Fax: [phone]      Notes:    Factors facilitating achievement of predicted outcomes: Family support, Cooperative, and Pleasant    Barriers to discharge: Medication managment    Additional Case Management Notes:   Lives with ex  who can assist her as needed. She is independent and drives. Has DMR but does not use. RW<cane,crutches, glucometer. Has been sober for 9 years. Goes to AA> uses [facility] for her psychiatric treatment for bipolar. Admitted with svt. Cardiology consulted. Await their work up. No needs anticipated     The Plan for Transition of Care is related to the following treatment goals of Paroxysmal supraventricular tachycardia ([address] [I47.1]  Arrhythmia [I49.9]  Hyperglycemia [R73.9]  Elevated lactic acid level [A73.98]    IF APPLICABLE: The Patient and/or patient representative Maury Paiz and her family were provided with a choice of provider and agrees with the discharge plan. Freedom of choice list with basic dialogue that supports the patient's individualized plan of care/goals and shares the quality data associated with the providers was provided to:     Patient Representative Name:       The Patient and/or Patient Representative Agree with the Discharge Plan?       Hardeep Bryant RN  Case Management Department  Ph: [phone] Fax: [phone]

## 2023-04-18 NOTE — CONSULTS
[facility]  CONSULT NOTE                  Date:   4/18/2023  Patient name: Tripp Flores  Date of admission:  4/17/2023  6:32 PM  MRN:   [id number]  YOB: 1980    Reason for Admission: SVT    CHIEF COMPLAINT:   palpitations, chest pain     History Obtained From:  Patient and chart review     HISTORY OF PRESENT ILLNESS:      36 yr old female with known hx of PSVT presented with sudden onset of palpitations, light headedness, and chest pressure, was found to be in regular narrow complex tachycardia with HR in 180's, cardioverted unsuccessfully per EMS, on arrival to ED ECG showed SVT likely AVNRT, was given adenosine with conversion to NSR. This afternoon she remains in normal sinus rhythm. She reports feeling significantly better. No further chest pain or dyspnea. She was recently discharged from [facility] after being admitted for DKA and SVT.          Past Medical History:   has a past medical history of Alcohol abuse, Anxiety, Arthritis, Painting esophagus, Benzodiazepine overdose, Bipolar disorder, unspecified ([address] Bipolar I disorder, most recent episode depressed, severe without psychotic features ([address] Cellulitis, Chronic abdominal wound infection, Constipation, Depression, Drug overdose, intentional ([address] Genital herpes, unspecified, GERD (gastroesophageal reflux disease), History of pulmonary embolism, Hx of blood clots, Hypertension, Iron deficiency anemia, Isopropyl alcohol poisoning, Lumbosacral spondylosis without myelopathy, MDRO (multiple drug resistant organisms) resistance, Miscarriage, Morbid obesity ([address] MRSA (methicillin resistant staph aureus) culture positive, Overdose of benzodiazepine, Pernicious anemia, Primary hypercoagulable state ([address] Pulmonary embolism ([address] Suicidal behavior, Suicidal ideation, Suicide attempt ([address] SVT (supraventricular tachycardia) ([address] Toxic effect of ethanol, intentional self-harm ([address] and Type 2 diabetes mellitus, with

## 2023-04-18 NOTE — H&P
Hospital Problems             Last Modified POA    * (Principal) Arrhythmia 4/18/2023 Yes    Paroxysmal supraventricular tachycardia ([facility] 4/18/2023 Yes    Type 2 diabetes mellitus with diabetic polyneuropathy, with long-term current use of insulin ([facility] 4/18/2023 Yes    Essential hypertension (Chronic) 4/18/2023 Yes    Primary hypercoagulable state ([facility] (Chronic) 4/18/2023 Yes    GERD (gastroesophageal reflux disease) 4/18/2023 Yes    Obesity, Class III, BMI 40-49.9 (morbid obesity) ([facility] 4/18/2023 Yes       Plan:     Patient status inpatient in the  Progressive Unit/Step down    Arrhythmia/ paroxysmal SVT: Cardiology consult as ordered. Cardiac meds as ordered. Telemetry. Monitor lytes daily. Replace as needed. IV hydration as ordered. Check lactic and BMP in AM.   DM: Monitor BG AC & HS. Insulins as ordered. HTN: BP meds as ordered. Monitor VS q 4 hours. Hypercoagulable state: Lovenox for DVT prophylaxis  GERD: Pepcid BID  Obesity: Weight loss management as OP per PCP    Consultations:   IP CONSULT TO INTERNAL MEDICINE  IP CONSULT TO CARDIOLOGY     Patient is admitted as inpatient status because of co-morbidities listed above, severity of signs and symptoms as outlined, requirement for current medical therapies and most importantly because of direct risk to patient if care not provided in a hospital setting. Expected length of stay > 48 hours.     BERNARDA High NP  4/18/2023  3:42 AM    Copy sent to Dr. Tyra Almeida

## 2023-04-18 NOTE — PLAN OF CARE
Patient admitted with arrythmia and hyperglycemia. Patient HR in ER was 140's. .  Patient given Adenocard in ER. Patient HR dropped to 120 which is her norm. Patients BG in ER was 461.  10 units of IV insulin was given. Patients BG dropped to 131. Patient came to PCU floor and had a box lunch. Vitals taken. Patient BP was 135/91 . Patient BG back up to 180. Patient c/o chest tightness. Latest trop was 23. Lactic Acid was 6.0.  1000 bolus of ns given. NS fluids are running at 125 ml/hr. Patient received Ativan for anxiety. Patient calmed down. Medications verified. Patient received Cogentin, Lopressor,  and Zyprexa. HR dropped to high 80's-low 90's. Patient is normal sinus on monitor. Patient is resting comfortably. Patient is ambulatory to restroom and does not use any ambulation aids at home, however, patient states that she has fallen before because she is clumsy. Bed alarm is on. Patient will call out appropriately to use restroom. Patient remains free from falls. Call light and bedside table within reach. Will continue to monitor. Patient Magnesium level this morning is 1.3 and requires replacement. 1st of 4 bags started.     Problem: Discharge Planning  Goal: Discharge to home or other facility with appropriate resources  Outcome: Progressing     Problem: Pain  Goal: Verbalizes/displays adequate comfort level or baseline comfort level  Outcome: Progressing     Problem: Safety - Adult  Goal: Free from fall injury  Outcome: Progressing     Problem: ABCDS Injury Assessment  Goal: Absence of physical injury  Outcome: Progressing

## 2023-04-19 VITALS
BODY MASS INDEX: 42.27 KG/M2 | DIASTOLIC BLOOD PRESSURE: 77 MMHG | TEMPERATURE: 97.9 F | OXYGEN SATURATION: 100 % | SYSTOLIC BLOOD PRESSURE: 113 MMHG | HEART RATE: 66 BPM | WEIGHT: 278.9 LBS | HEIGHT: 68 IN | RESPIRATION RATE: 16 BRPM

## 2023-04-19 LAB
ANION GAP SERPL CALCULATED.3IONS-SCNC: 7 MMOL/L (ref 9–17)
BUN SERPL-MCNC: 8 MG/DL (ref 6–20)
BUN/CREAT BLD: 11 (ref 9–20)
CA-I BLD-SCNC: 1.22 MMOL/L (ref 1.13–1.33)
CALCIUM SERPL-MCNC: 7.9 MG/DL (ref 8.6–10.4)
CHLORIDE SERPL-SCNC: 107 MMOL/L (ref 98–107)
CO2 SERPL-SCNC: 25 MMOL/L (ref 20–31)
CREAT SERPL-MCNC: 0.71 MG/DL (ref 0.5–0.9)
GFR SERPL CREATININE-BSD FRML MDRD: >60 ML/MIN/1.73M2
GLUCOSE BLD-MCNC: 143 MG/DL (ref 65–105)
GLUCOSE BLD-MCNC: 73 MG/DL (ref 65–105)
GLUCOSE BLD-MCNC: 87 MG/DL (ref 65–105)
GLUCOSE SERPL-MCNC: 131 MG/DL (ref 70–99)
LV EF: 65 %
LVEF MODALITY: NORMAL
MAGNESIUM SERPL-MCNC: 1.9 MG/DL (ref 1.6–2.6)
POTASSIUM SERPL-SCNC: 4.2 MMOL/L (ref 3.7–5.3)
SODIUM SERPL-SCNC: 139 MMOL/L (ref 135–144)
TROPONIN I SERPL DL<=0.01 NG/ML-MCNC: 10 NG/L (ref 0–14)

## 2023-04-19 PROCEDURE — 6360000002 HC RX W HCPCS: Performed by: STUDENT IN AN ORGANIZED HEALTH CARE EDUCATION/TRAINING PROGRAM

## 2023-04-19 PROCEDURE — 2580000003 HC RX 258: Performed by: NURSE PRACTITIONER

## 2023-04-19 PROCEDURE — 6370000000 HC RX 637 (ALT 250 FOR IP): Performed by: INTERNAL MEDICINE

## 2023-04-19 PROCEDURE — 83735 ASSAY OF MAGNESIUM: CPT

## 2023-04-19 PROCEDURE — 99232 SBSQ HOSP IP/OBS MODERATE 35: CPT | Performed by: STUDENT IN AN ORGANIZED HEALTH CARE EDUCATION/TRAINING PROGRAM

## 2023-04-19 PROCEDURE — 84484 ASSAY OF TROPONIN QUANT: CPT

## 2023-04-19 PROCEDURE — 82947 ASSAY GLUCOSE BLOOD QUANT: CPT

## 2023-04-19 PROCEDURE — 6360000002 HC RX W HCPCS: Performed by: INTERNAL MEDICINE

## 2023-04-19 PROCEDURE — 36415 COLL VENOUS BLD VENIPUNCTURE: CPT

## 2023-04-19 PROCEDURE — 6370000000 HC RX 637 (ALT 250 FOR IP): Performed by: NURSE PRACTITIONER

## 2023-04-19 PROCEDURE — 82330 ASSAY OF CALCIUM: CPT

## 2023-04-19 PROCEDURE — 93306 TTE W/DOPPLER COMPLETE: CPT

## 2023-04-19 PROCEDURE — 80048 BASIC METABOLIC PNL TOTAL CA: CPT

## 2023-04-19 RX ORDER — METOPROLOL TARTRATE 75 MG/1
75 TABLET, FILM COATED ORAL 2 TIMES DAILY
Qty: 60 TABLET | Refills: 3 | Status: SHIPPED | OUTPATIENT
Start: 2023-04-19 | End: 2023-04-19 | Stop reason: SDUPTHER

## 2023-04-19 RX ORDER — METOPROLOL TARTRATE 75 MG/1
75 TABLET, FILM COATED ORAL 2 TIMES DAILY
Qty: 60 TABLET | Refills: 3 | Status: ON HOLD | OUTPATIENT
Start: 2023-04-19

## 2023-04-19 RX ORDER — OXYCODONE HYDROCHLORIDE 5 MG/1
5 TABLET ORAL EVERY 8 HOURS PRN
Qty: 9 TABLET | Refills: 0 | Status: SHIPPED | OUTPATIENT
Start: 2023-04-19 | End: 2023-04-22

## 2023-04-19 RX ORDER — MAGNESIUM SULFATE IN WATER 40 MG/ML
2000 INJECTION, SOLUTION INTRAVENOUS ONCE
Status: COMPLETED | OUTPATIENT
Start: 2023-04-19 | End: 2023-04-19

## 2023-04-19 RX ADMIN — BENZTROPINE MESYLATE 1 MG: 1 TABLET ORAL at 10:04

## 2023-04-19 RX ADMIN — OXYCODONE HYDROCHLORIDE 10 MG: 5 TABLET ORAL at 13:29

## 2023-04-19 RX ADMIN — ENOXAPARIN SODIUM 120 MG: 150 INJECTION SUBCUTANEOUS at 09:54

## 2023-04-19 RX ADMIN — OXYCODONE HYDROCHLORIDE 5 MG: 5 TABLET ORAL at 01:44

## 2023-04-19 RX ADMIN — OXYCODONE HYDROCHLORIDE 10 MG: 5 TABLET ORAL at 09:55

## 2023-04-19 RX ADMIN — SODIUM CHLORIDE 10 ML/HR: 9 INJECTION, SOLUTION INTRAVENOUS at 10:10

## 2023-04-19 RX ADMIN — METOPROLOL TARTRATE 75 MG: 25 TABLET, FILM COATED ORAL at 11:46

## 2023-04-19 RX ADMIN — MAGNESIUM SULFATE HEPTAHYDRATE 2000 MG: 40 INJECTION, SOLUTION INTRAVENOUS at 10:11

## 2023-04-19 RX ADMIN — BUPROPION HYDROCHLORIDE 300 MG: 150 TABLET, EXTENDED RELEASE ORAL at 10:04

## 2023-04-19 RX ADMIN — Medication 1000 UNITS: at 09:54

## 2023-04-19 RX ADMIN — MULTIPLE VITAMINS W/ MINERALS TAB 1 TABLET: TAB at 09:56

## 2023-04-19 RX ADMIN — DILTIAZEM HYDROCHLORIDE 360 MG: 180 CAPSULE, COATED, EXTENDED RELEASE ORAL at 09:55

## 2023-04-19 RX ADMIN — SODIUM CHLORIDE, PRESERVATIVE FREE 10 ML: 5 INJECTION INTRAVENOUS at 10:00

## 2023-04-19 RX ADMIN — FOLIC ACID 1 MG: 1 TABLET ORAL at 09:54

## 2023-04-19 RX ADMIN — FLUOXETINE 60 MG: 20 CAPSULE ORAL at 09:56

## 2023-04-19 RX ADMIN — VALACYCLOVIR HYDROCHLORIDE 500 MG: 500 TABLET, FILM COATED ORAL at 09:54

## 2023-04-19 RX ADMIN — INSULIN GLARGINE 24 UNITS: 100 INJECTION, SOLUTION SUBCUTANEOUS at 09:56

## 2023-04-19 RX ADMIN — FAMOTIDINE 20 MG: 20 TABLET, FILM COATED ORAL at 09:54

## 2023-04-19 RX ADMIN — OXYCODONE HYDROCHLORIDE 10 MG: 5 TABLET ORAL at 05:33

## 2023-04-19 RX ADMIN — LORAZEPAM 2 MG: 1 TABLET ORAL at 02:44

## 2023-04-19 RX ADMIN — LORAZEPAM 1 MG: 1 TABLET ORAL at 10:01

## 2023-04-19 ASSESSMENT — PAIN SCALES - GENERAL
PAINLEVEL_OUTOF10: 8
PAINLEVEL_OUTOF10: 7

## 2023-04-19 ASSESSMENT — PAIN DESCRIPTION - ORIENTATION
ORIENTATION: RIGHT

## 2023-04-19 ASSESSMENT — PAIN DESCRIPTION - LOCATION
LOCATION: HEAD;FLANK
LOCATION: FLANK
LOCATION: FLANK;HEAD
LOCATION: FLANK;HEAD

## 2023-04-19 NOTE — DISCHARGE INSTRUCTIONS
Follow-up with your PCP in 3 days. Call for problems as possible. Follow with cardiology as instructed. Call office tomorrow for referral to EP. Medications as instructed. Return to the emergency room immediately for any new or worsening concerns.

## 2023-04-19 NOTE — DISCHARGE SUMMARY
[facility]  Office: [address], DO, Jaciel Emilianoe, DO, Kashifdg Gibbs, DO, Tawnya Otero Blood, DO, Meghan Jolly MD, Brian Narayanan MD, Arthur Priest MD, Callie Smith MD,  Omi Mercado MD, Leticia Lanes, MD, Victor Hugo Guevara DO, Chris Rodriguez MD,  Suresh Patricio MD, Kendra Red MD, Joe Erazo DO, Carmen Leos MD, Reuben Ortega MD, Eduard South DO, Kristy Mortensen MD, Sarmad Cowart MD, Bonnie Carolina MD, Sherrie Tovar MD,  Jona Sarabia DO, Krystian Li MD,  Neelam Olea, CNP,  Carmina Welch, CNP, Pat Kuhn, CNP, Cuca Kauffman, CNP,  Shivam Woods, [facility], Mike Pollard, CNP, Tammy England, CNP, Alycia Barnes, CNP, Nandini Plasencia, CNP, Ignacia Lees, CNP, Bushra Rodrigeuz, PAAjC, Jasmeet Agarwal, CNS, Tiffani Settler, CNP, Cloteal Hustisford, [facility]    Discharge Summary     Patient ID: Armani Forbes  :  1980   MRN: [id number]     ACCOUNT:  [de-identified]   Patient's PCP: Neva Pacheco Date: 2023   Discharge Date: 2023     Length of Stay: 2  Code Status:  Full Code  Admitting Physician: Eduard South DO  Discharge Physician: Eduard South DO     Active Discharge Diagnoses:     Hospital Problem Lists:  Principal Problem:    Arrhythmia  Active Problems:    Primary hypercoagulable state ([facility]    GERD (gastroesophageal reflux disease)    Type 2 diabetes mellitus with diabetic polyneuropathy, with long-term current use of insulin ([facility])    Essential hypertension    Obesity, Class III, BMI 40-49.9 (morbid obesity) ([facility]    Paroxysmal supraventricular tachycardia ([facility]  Resolved Problems:    * No resolved hospital problems. *      Admission Condition:  fair     Discharged Condition: good    Hospital Stay:     Hospital Course:       This is a 40-year-old female with a history of SVT who presents to the hospital for evaluation of palpitations, dizziness

## 2023-04-19 NOTE — PROGRESS NOTES
Cardiology ok for discharge. Want pt to call Dr. Gutierrez Juan office for referral to electrophysiology.  Would like pt to call tomorrow morning
[facility]  Office: [address], DO, Maria Guadalupe Daugherty, DO, Christine Serrano, DO, Irish Suazo Blood, DO, Constantine Aden MD, Ferdinand Grossman MD, Glenda Crigler, MD, Harleen Nunez MD,  Moy Cisneros MD, Shahid Frank MD, Olivia England, DO, Pattie Kam MD,  Cristiane Winters MD, Jennifer Torres MD, Jesenia De Leon DO, Ronnie Painter MD, Corey Leslie MD, Arti Ledesma DO, Lloyd Haynes MD, Tatiana Eaton MD, Anamika Garcia MD, Germán Barrientos MD,  Tejas Galvan DO, Regina Stewart MD,  Derek Carolina, CNP,  Swapna Adkins, CNP, Luis Downs, CNP, Shaina Guthrie, CNP,  Beni Motta, [facility], Ruma Allison, CNP, Miguelito Schafer, CNP, Vladimir Barker, CNP, Neymar Bowling, CNP, Sam Hill, CNP, Omar Claudio PA-C, Rajani Platt, CNS, Juan Luis Lin, CNP, Umm Waters, [facility]    Progress Note    4/19/2023    7:18 AM    Name:   Tripp Flores  MRN:     [id number]     [place]:      [de-identified]   Room:   [address] [place] [address] Day:  2  Admit Date:  4/17/2023  6:32 PM    PCP:   Kevin Hutton  Code Status:  Full Code    Subjective:     C/C:   Chief Complaint   Patient presents with    Chest Pain     Pressure, HR sustained in the 180's      Interval History Status: improved. Vitals reviewed, afebrile and hemodynamically stable. Saturating well on room air. Labs reviewed, potassium 4.2 and magnesium 1.9. 2 g mag given. Overnight patient had no significant events. On examination patient complains of posterior headache possibly secondary to decreased caffeine intake. Evaluated yesterday by cardiology with plan to increase Lopressor to 75 mg twice daily, continue Cardizem 360 mg daily with recommendations to repeat echocardiogram.  EP referral as outpatient for possible ablation. Brief History:      This is a 50-year-old female with a history of SVT who presents to the hospital for evaluation of
Patient remains free from falls this shift. Patient's heart rate had been controlled in normal sinu rhythm. Patient denies chest pain and palpitations. Patient up ad mary, IV in left breast remains patent. Magnesium replaced per sliding scale. Cardiology rounded today, awaiting recommendations. Discharge plan is to home. Safety measures continued.
Pt discharged to home, left via private vehicle with . Belonging gathered and taken with pt, IV removed, discharge instruction given, pt verbalizes understanding. All questions and concerns addressed. Safety maintained.
[] Medication Reconciliation was completed and the patient's home medication list was verified. The Med List Status is \"Complete\". The following sources were used to assist with Medication Reconciliation:    [] Patient had a list of medications which was transcribed into the EHR. [] Patient provided bottles of their medications    [x] Home medications reviewed and confirmed with Bharat Linker    [] Contacted patient's pharmacy to confirm home medications    [] Contacted patient's physician office to confirm home medications    [] Medical Records from another facility and/or Care Everywhere were reviewed      [x] There are one or more home medications that need clarification before Medication Reconciliation can be completed. The Med List Status has been marked as In Progress. To assist with Home Medication Reconciliation the following actions have been taken:    [x] Pharmacy medication reconciliation service requested.  (Note: This can be done by sending a Perfect Serve message to [facility] Pharmacist or by Dulce Mijares [phone].)  [] Family requested to bring medications into the hospital  [] Family requested to call hospital with medication list  [] Message left with physician office  [] Request for medical records made to n/a  [] Other n/a
Vitamins-Minerals (THERAPEUTIC MULTIVITAMIN-MINERALS) tablet, Take 1 tablet by mouth daily  vitamin D (CHOLECALCIFEROL) 1000 UNIT TABS tablet, Take 1 tablet by mouth daily
Problems             Last Modified POA    * (Principal) Arrhythmia 4/18/2023 Yes    Primary hypercoagulable state ([address] (Chronic) 4/18/2023 Yes    GERD (gastroesophageal reflux disease) 4/18/2023 Yes    Type 2 diabetes mellitus with diabetic polyneuropathy, with long-term current use of insulin ([address] 4/18/2023 Yes    Essential hypertension (Chronic) 4/18/2023 Yes    Obesity, Class III, BMI 40-49.9 (morbid obesity) ([address] 4/18/2023 Yes    Paroxysmal supraventricular tachycardia ([address] 4/18/2023 Yes       Plan:        Arrhythmia/ paroxysmal SVT:  Recently discharged from [facility] with similar issues. Start Cardizem 360 mg daily, metoprolol 50 mg twice daily  Continue telemetry, cardiology consulted for recommendations, pt may need ablation  Replace O's, keep magnesium greater than 2 potassium 4. Stop IV fluid patient is tolerating her diet  Restart full dose anticoagulation   MI type II likely due to SVT. Troponin 23->16 with EKG changes and palpitations  Continue treatment as outlined above. Troponin is downtrending, concern for ACS is low  Hypocalcemia:  Check ionized calcium-1.15  Uncontrolled diabetes mellitus type 2 with hyperglycemia, has history of being prediabetic  Restart insulin regimen, on Lantus 24 units daily. monitor for hypo-/hyperglycemia. Check A1c  Consider stopping [place] on d/c if this is contributing to hypovolemia. High anion gap metabolic acidosis  This problem has resolved  Acute hypomagnesemia  Replace magnesium  HTN: BP meds as ordered. Monitor VS q 4 hours.    GERD: Pepcid BID  Obesity: Weight loss management as OP per PCP  History of hypercoagulable state pulm embolism- Start full dose lovenox   Depression: start home Fluoxetine       Sam Holloway DO  4/18/2023  8:28 AM

## 2023-04-19 NOTE — PLAN OF CARE
Patient is resting in bed tonight. Patient was having complaints of right flank pain, PRN Yocasta given. Cardiology increased Lopressor to 75mg BID, and is wanting a repeat limited echocardiogram done. Patient's HR is in the 90's, no complaints of chest pain at this time. Patient is still in contact isolation for MRSA. Will continue with care plan.       Problem: Pain  Goal: Verbalizes/displays adequate comfort level or baseline comfort level  Outcome: Progressing     Problem: Safety - Adult  Goal: Free from fall injury  4/18/2023 2312 by Vannessa Velasquez RN  Outcome: Progressing     Problem: Cardiovascular - Adult  Goal: Maintains optimal cardiac output and hemodynamic stability  Outcome: Progressing

## 2023-04-19 NOTE — PLAN OF CARE
Pt was seen today, spent most of the day in her chair, complains of pain to the right flank and to to occipital lobe. Pain medications given, pain level has subsided. She is A&Ox4, independent, continent of bowel and bladder. Pt walked down to cafeteria with no complications. All questions and concerns addressed. Bed is locked and in the lowest position with the call light in reach. Will continue to provide support and education as needed.      Problem: Discharge Planning  Goal: Discharge to home or other facility with appropriate resources  Outcome: Progressing  Flowsheets (Taken 4/19/2023 0800)  Discharge to home or other facility with appropriate resources: Identify barriers to discharge with patient and caregiver     Problem: Pain  Goal: Verbalizes/displays adequate comfort level or baseline comfort level  Outcome: Progressing     Problem: Safety - Adult  Goal: Free from fall injury  Outcome: Progressing     Problem: ABCDS Injury Assessment  Goal: Absence of physical injury  Outcome: Progressing

## 2023-04-21 LAB
EST. AVERAGE GLUCOSE BLD GHB EST-MCNC: 192 MG/DL
HBA1C MFR BLD: 8.3 % (ref 4–6)

## 2023-04-29 ENCOUNTER — APPOINTMENT (OUTPATIENT)
Dept: GENERAL RADIOLOGY | Age: 43
DRG: 896 | End: 2023-04-29
Payer: MEDICARE

## 2023-04-29 ENCOUNTER — HOSPITAL ENCOUNTER (INPATIENT)
Age: 43
LOS: 12 days | Discharge: PSYCHIATRIC OR SUBSTANCE USE FACILITY | DRG: 896 | End: 2023-05-11
Attending: EMERGENCY MEDICINE | Admitting: INTERNAL MEDICINE
Payer: MEDICARE

## 2023-04-29 ENCOUNTER — APPOINTMENT (OUTPATIENT)
Dept: CT IMAGING | Age: 43
DRG: 896 | End: 2023-04-29
Payer: MEDICARE

## 2023-04-29 DIAGNOSIS — R73.9 HYPERGLYCEMIA: Primary | ICD-10-CM

## 2023-04-29 DIAGNOSIS — F10.930 ALCOHOL WITHDRAWAL SYNDROME WITHOUT COMPLICATION (HCC): ICD-10-CM

## 2023-04-29 DIAGNOSIS — R07.9 CHEST PAIN, UNSPECIFIED TYPE: ICD-10-CM

## 2023-04-29 LAB
ABSOLUTE EOS #: 0 K/UL (ref 0–0.4)
ABSOLUTE LYMPH #: 1.1 K/UL (ref 1–4.8)
ABSOLUTE MONO #: 0.5 K/UL (ref 0.1–1.3)
ALBUMIN SERPL-MCNC: 2.9 G/DL (ref 3.5–5.2)
ALLEN TEST: ABNORMAL
ALP SERPL-CCNC: 359 U/L (ref 35–104)
ALT SERPL-CCNC: 152 U/L (ref 5–33)
ANION GAP SERPL CALCULATED.3IONS-SCNC: 21 MMOL/L (ref 9–17)
AST SERPL-CCNC: 418 U/L
BACTERIA: NORMAL
BASOPHILS # BLD: 1 % (ref 0–2)
BASOPHILS ABSOLUTE: 0.1 K/UL (ref 0–0.2)
BETA-HYDROXYBUTYRATE: 0.17 MMOL/L (ref 0.02–0.27)
BILIRUB SERPL-MCNC: 0.4 MG/DL (ref 0.3–1.2)
BILIRUBIN URINE: NEGATIVE
BUN SERPL-MCNC: 5 MG/DL (ref 6–20)
CALCIUM SERPL-MCNC: 8 MG/DL (ref 8.6–10.4)
CARBOXYHEMOGLOBIN: 1.3 % (ref 0–5)
CASTS UA: NORMAL /LPF
CHLORIDE SERPL-SCNC: 93 MMOL/L (ref 98–107)
CO2 SERPL-SCNC: 18 MMOL/L (ref 20–31)
COLOR: YELLOW
CREAT SERPL-MCNC: 0.45 MG/DL (ref 0.5–0.9)
D DIMER BLD IA.RAPID-MCNC: 1.85 UG/ML FEU (ref 0–0.59)
EOSINOPHILS RELATIVE PERCENT: 0 % (ref 0–4)
EPITHELIAL CELLS UA: NORMAL /HPF
GFR SERPL CREATININE-BSD FRML MDRD: >60 ML/MIN/1.73M2
GLUCOSE BLD-MCNC: 340 MG/DL (ref 65–105)
GLUCOSE BLD-MCNC: 578 MG/DL (ref 65–105)
GLUCOSE BLD-MCNC: 83 MG/DL (ref 65–105)
GLUCOSE SERPL-MCNC: 527 MG/DL (ref 70–99)
GLUCOSE UR STRIP.AUTO-MCNC: ABNORMAL MG/DL
HCO3 ARTERIAL: 19.3 MMOL/L (ref 22–26)
HCT VFR BLD AUTO: 39.7 % (ref 36–46)
HGB BLD-MCNC: 13 G/DL (ref 12–16)
INR PPP: 1.1
KETONES UR STRIP.AUTO-MCNC: NEGATIVE MG/DL
LEUKOCYTE ESTERASE UR QL STRIP.AUTO: NEGATIVE
LIPASE SERPL-CCNC: 68 U/L (ref 13–60)
LYMPHOCYTES # BLD: 9 % (ref 24–44)
MCH RBC QN AUTO: 31.4 PG (ref 26–34)
MCHC RBC AUTO-ENTMCNC: 32.7 G/DL (ref 31–37)
MCV RBC AUTO: 95.8 FL (ref 80–100)
METHEMOGLOBIN: 0.7 % (ref 0–1.9)
MONOCYTES # BLD: 4 % (ref 1–7)
MYOGLOBIN SERPL-MCNC: 164 NG/ML (ref 25–58)
MYOGLOBIN SERPL-MCNC: 96 NG/ML (ref 25–58)
NEGATIVE BASE EXCESS, ART: 5.5 MMOL/L (ref 0–2)
NITRITE UR QL STRIP.AUTO: NEGATIVE
O2 DEVICE/FLOW/%: ABNORMAL
O2 SAT, ARTERIAL: 92.4 % (ref 95–98)
PARTIAL THROMBOPLASTIN TIME: 29.4 SEC (ref 24–36)
PATIENT TEMP: 37
PCO2 ARTERIAL: 31.4 MMHG (ref 35–45)
PDW BLD-RTO: 16 % (ref 11.5–14.9)
PH ARTERIAL: 7.4 (ref 7.35–7.45)
PLATELET # BLD AUTO: 294 K/UL (ref 150–450)
PMV BLD AUTO: 9.6 FL (ref 6–12)
PO2 ARTERIAL: 74.8 MMHG (ref 80–100)
POTASSIUM SERPL-SCNC: 4.3 MMOL/L (ref 3.7–5.3)
PROT SERPL-MCNC: 5.8 G/DL (ref 6.4–8.3)
PROT UR STRIP.AUTO-MCNC: 5.5 MG/DL (ref 5–8)
PROT UR STRIP.AUTO-MCNC: ABNORMAL MG/DL
PROTHROMBIN TIME: 14.9 SEC (ref 11.8–14.6)
PT. POSITION: ABNORMAL
RBC # BLD: 4.15 M/UL (ref 4–5.2)
RBC CLUMPS #/AREA URNS AUTO: NORMAL /HPF
RESPIRATORY RATE: 16
SAMPLE SITE: ABNORMAL
SEG NEUTROPHILS: 86 % (ref 36–66)
SEGMENTED NEUTROPHILS ABSOLUTE COUNT: 10.1 K/UL (ref 1.3–9.1)
SODIUM SERPL-SCNC: 132 MMOL/L (ref 135–144)
SPECIFIC GRAVITY UA: 1.04 (ref 1–1.03)
T4 FREE SERPL-MCNC: 0.9 NG/DL (ref 0.9–1.7)
TROPONIN I SERPL DL<=0.01 NG/ML-MCNC: 10 NG/L (ref 0–14)
TROPONIN I SERPL DL<=0.01 NG/ML-MCNC: 9 NG/L (ref 0–14)
TSH SERPL-ACNC: 1.14 UIU/ML (ref 0.3–5)
TURBIDITY: CLEAR
URINE HGB: ABNORMAL
UROBILINOGEN, URINE: NORMAL
WBC # BLD AUTO: 11.7 K/UL (ref 3.5–11)
WBC UA: NORMAL /HPF

## 2023-04-29 PROCEDURE — 99223 1ST HOSP IP/OBS HIGH 75: CPT | Performed by: INTERNAL MEDICINE

## 2023-04-29 PROCEDURE — 83874 ASSAY OF MYOGLOBIN: CPT

## 2023-04-29 PROCEDURE — 36600 WITHDRAWAL OF ARTERIAL BLOOD: CPT

## 2023-04-29 PROCEDURE — 6370000000 HC RX 637 (ALT 250 FOR IP)

## 2023-04-29 PROCEDURE — 02HV33Z INSERTION OF INFUSION DEVICE INTO SUPERIOR VENA CAVA, PERCUTANEOUS APPROACH: ICD-10-PCS | Performed by: INTERNAL MEDICINE

## 2023-04-29 PROCEDURE — 2580000003 HC RX 258

## 2023-04-29 PROCEDURE — 36415 COLL VENOUS BLD VENIPUNCTURE: CPT

## 2023-04-29 PROCEDURE — 2500000003 HC RX 250 WO HCPCS: Performed by: INTERNAL MEDICINE

## 2023-04-29 PROCEDURE — 81001 URINALYSIS AUTO W/SCOPE: CPT

## 2023-04-29 PROCEDURE — 6360000002 HC RX W HCPCS: Performed by: EMERGENCY MEDICINE

## 2023-04-29 PROCEDURE — 71045 X-RAY EXAM CHEST 1 VIEW: CPT

## 2023-04-29 PROCEDURE — 84439 ASSAY OF FREE THYROXINE: CPT

## 2023-04-29 PROCEDURE — 2060000000 HC ICU INTERMEDIATE R&B

## 2023-04-29 PROCEDURE — 6370000000 HC RX 637 (ALT 250 FOR IP): Performed by: EMERGENCY MEDICINE

## 2023-04-29 PROCEDURE — 85730 THROMBOPLASTIN TIME PARTIAL: CPT

## 2023-04-29 PROCEDURE — 96372 THER/PROPH/DIAG INJ SC/IM: CPT

## 2023-04-29 PROCEDURE — 6360000004 HC RX CONTRAST MEDICATION: Performed by: EMERGENCY MEDICINE

## 2023-04-29 PROCEDURE — 36556 INSERT NON-TUNNEL CV CATH: CPT

## 2023-04-29 PROCEDURE — 85025 COMPLETE CBC W/AUTO DIFF WBC: CPT

## 2023-04-29 PROCEDURE — 84443 ASSAY THYROID STIM HORMONE: CPT

## 2023-04-29 PROCEDURE — 93005 ELECTROCARDIOGRAM TRACING: CPT | Performed by: EMERGENCY MEDICINE

## 2023-04-29 PROCEDURE — 85610 PROTHROMBIN TIME: CPT

## 2023-04-29 PROCEDURE — 82947 ASSAY GLUCOSE BLOOD QUANT: CPT

## 2023-04-29 PROCEDURE — 85379 FIBRIN DEGRADATION QUANT: CPT

## 2023-04-29 PROCEDURE — 80053 COMPREHEN METABOLIC PANEL: CPT

## 2023-04-29 PROCEDURE — 84484 ASSAY OF TROPONIN QUANT: CPT

## 2023-04-29 PROCEDURE — 82805 BLOOD GASES W/O2 SATURATION: CPT

## 2023-04-29 PROCEDURE — HZ2ZZZZ DETOXIFICATION SERVICES FOR SUBSTANCE ABUSE TREATMENT: ICD-10-PCS | Performed by: INTERNAL MEDICINE

## 2023-04-29 PROCEDURE — 71260 CT THORAX DX C+: CPT

## 2023-04-29 PROCEDURE — 2500000003 HC RX 250 WO HCPCS

## 2023-04-29 PROCEDURE — 82010 KETONE BODYS QUAN: CPT

## 2023-04-29 PROCEDURE — 96375 TX/PRO/DX INJ NEW DRUG ADDON: CPT

## 2023-04-29 PROCEDURE — 83690 ASSAY OF LIPASE: CPT

## 2023-04-29 PROCEDURE — 2580000003 HC RX 258: Performed by: EMERGENCY MEDICINE

## 2023-04-29 PROCEDURE — 96374 THER/PROPH/DIAG INJ IV PUSH: CPT

## 2023-04-29 PROCEDURE — 6360000002 HC RX W HCPCS

## 2023-04-29 PROCEDURE — 99285 EMERGENCY DEPT VISIT HI MDM: CPT

## 2023-04-29 RX ORDER — LORAZEPAM 2 MG/ML
2 INJECTION INTRAMUSCULAR
Status: DISCONTINUED | OUTPATIENT
Start: 2023-04-29 | End: 2023-04-30

## 2023-04-29 RX ORDER — INSULIN LISPRO 100 [IU]/ML
0-8 INJECTION, SOLUTION INTRAVENOUS; SUBCUTANEOUS
Status: DISCONTINUED | OUTPATIENT
Start: 2023-04-29 | End: 2023-04-30

## 2023-04-29 RX ORDER — SODIUM CHLORIDE 0.9 % (FLUSH) 0.9 %
5-40 SYRINGE (ML) INJECTION EVERY 12 HOURS SCHEDULED
Status: DISCONTINUED | OUTPATIENT
Start: 2023-04-29 | End: 2023-05-11 | Stop reason: HOSPADM

## 2023-04-29 RX ORDER — OLANZAPINE 15 MG/1
15 TABLET ORAL NIGHTLY
Status: DISCONTINUED | OUTPATIENT
Start: 2023-04-29 | End: 2023-04-30

## 2023-04-29 RX ORDER — FONDAPARINUX SODIUM 10 MG/.8ML
10 INJECTION SUBCUTANEOUS DAILY
Status: DISCONTINUED | OUTPATIENT
Start: 2023-04-29 | End: 2023-05-11 | Stop reason: HOSPADM

## 2023-04-29 RX ORDER — LORAZEPAM 2 MG/ML
4 INJECTION INTRAMUSCULAR
Status: DISCONTINUED | OUTPATIENT
Start: 2023-04-29 | End: 2023-04-29 | Stop reason: SDUPTHER

## 2023-04-29 RX ORDER — INSULIN LISPRO 100 [IU]/ML
0-4 INJECTION, SOLUTION INTRAVENOUS; SUBCUTANEOUS NIGHTLY
Status: DISCONTINUED | OUTPATIENT
Start: 2023-04-29 | End: 2023-04-30

## 2023-04-29 RX ORDER — LORAZEPAM 2 MG/ML
1 INJECTION INTRAMUSCULAR
Status: DISCONTINUED | OUTPATIENT
Start: 2023-04-29 | End: 2023-04-29 | Stop reason: SDUPTHER

## 2023-04-29 RX ORDER — TOPIRAMATE 100 MG/1
100 TABLET, FILM COATED ORAL NIGHTLY
Status: DISCONTINUED | OUTPATIENT
Start: 2023-04-29 | End: 2023-04-30

## 2023-04-29 RX ORDER — LORAZEPAM 2 MG/ML
3 INJECTION INTRAMUSCULAR
Status: DISCONTINUED | OUTPATIENT
Start: 2023-04-29 | End: 2023-04-29 | Stop reason: SDUPTHER

## 2023-04-29 RX ORDER — SODIUM CHLORIDE 0.9 % (FLUSH) 0.9 %
10 SYRINGE (ML) INJECTION PRN
Status: DISCONTINUED | OUTPATIENT
Start: 2023-04-29 | End: 2023-05-11 | Stop reason: HOSPADM

## 2023-04-29 RX ORDER — LORAZEPAM 1 MG/1
3 TABLET ORAL
Status: DISCONTINUED | OUTPATIENT
Start: 2023-04-29 | End: 2023-04-29 | Stop reason: SDUPTHER

## 2023-04-29 RX ORDER — DIPHENHYDRAMINE HYDROCHLORIDE 50 MG/ML
25 INJECTION INTRAMUSCULAR; INTRAVENOUS ONCE
Status: COMPLETED | OUTPATIENT
Start: 2023-04-29 | End: 2023-04-29

## 2023-04-29 RX ORDER — LORAZEPAM 1 MG/1
1 TABLET ORAL
Status: DISCONTINUED | OUTPATIENT
Start: 2023-04-29 | End: 2023-04-30

## 2023-04-29 RX ORDER — LORAZEPAM 1 MG/1
3 TABLET ORAL
Status: DISCONTINUED | OUTPATIENT
Start: 2023-04-29 | End: 2023-04-30

## 2023-04-29 RX ORDER — LORAZEPAM 2 MG/ML
1 INJECTION INTRAMUSCULAR
Status: DISCONTINUED | OUTPATIENT
Start: 2023-04-29 | End: 2023-04-30

## 2023-04-29 RX ORDER — LORAZEPAM 1 MG/1
2 TABLET ORAL
Status: DISCONTINUED | OUTPATIENT
Start: 2023-04-29 | End: 2023-04-30

## 2023-04-29 RX ORDER — BENZTROPINE MESYLATE 1 MG/1
1 TABLET ORAL 2 TIMES DAILY
Status: DISCONTINUED | OUTPATIENT
Start: 2023-04-29 | End: 2023-04-30

## 2023-04-29 RX ORDER — BUPROPION HYDROCHLORIDE 300 MG/1
300 TABLET ORAL EVERY MORNING
Status: DISCONTINUED | OUTPATIENT
Start: 2023-04-30 | End: 2023-05-11 | Stop reason: HOSPADM

## 2023-04-29 RX ORDER — 0.9 % SODIUM CHLORIDE 0.9 %
80 INTRAVENOUS SOLUTION INTRAVENOUS ONCE
Status: COMPLETED | OUTPATIENT
Start: 2023-04-29 | End: 2023-04-29

## 2023-04-29 RX ORDER — ONDANSETRON 2 MG/ML
4 INJECTION INTRAMUSCULAR; INTRAVENOUS ONCE
Status: COMPLETED | OUTPATIENT
Start: 2023-04-29 | End: 2023-04-29

## 2023-04-29 RX ORDER — SODIUM CHLORIDE 9 MG/ML
INJECTION, SOLUTION INTRAVENOUS CONTINUOUS
Status: DISCONTINUED | OUTPATIENT
Start: 2023-04-29 | End: 2023-04-30

## 2023-04-29 RX ORDER — POLYETHYLENE GLYCOL 3350 17 G/17G
17 POWDER, FOR SOLUTION ORAL DAILY PRN
Status: DISCONTINUED | OUTPATIENT
Start: 2023-04-29 | End: 2023-05-11 | Stop reason: HOSPADM

## 2023-04-29 RX ORDER — LORAZEPAM 2 MG/ML
1 INJECTION INTRAMUSCULAR ONCE
Status: DISCONTINUED | OUTPATIENT
Start: 2023-04-29 | End: 2023-04-29

## 2023-04-29 RX ORDER — GAUZE BANDAGE 2" X 2"
100 BANDAGE TOPICAL DAILY
Status: DISCONTINUED | OUTPATIENT
Start: 2023-04-29 | End: 2023-04-30

## 2023-04-29 RX ORDER — LORAZEPAM 1 MG/1
4 TABLET ORAL
Status: DISCONTINUED | OUTPATIENT
Start: 2023-04-29 | End: 2023-04-29 | Stop reason: SDUPTHER

## 2023-04-29 RX ORDER — SODIUM CHLORIDE 0.9 % (FLUSH) 0.9 %
5-40 SYRINGE (ML) INJECTION PRN
Status: DISCONTINUED | OUTPATIENT
Start: 2023-04-29 | End: 2023-05-01 | Stop reason: SDUPTHER

## 2023-04-29 RX ORDER — INSULIN GLARGINE 100 [IU]/ML
35 INJECTION, SOLUTION SUBCUTANEOUS DAILY
Status: DISCONTINUED | OUTPATIENT
Start: 2023-04-29 | End: 2023-04-30

## 2023-04-29 RX ORDER — FOLIC ACID 1 MG/1
1 TABLET ORAL DAILY
Status: DISCONTINUED | OUTPATIENT
Start: 2023-04-29 | End: 2023-04-30

## 2023-04-29 RX ORDER — LORAZEPAM 2 MG/ML
1 INJECTION INTRAMUSCULAR EVERY 6 HOURS PRN
Status: DISCONTINUED | OUTPATIENT
Start: 2023-04-29 | End: 2023-04-29

## 2023-04-29 RX ORDER — INSULIN LISPRO 100 [IU]/ML
14 INJECTION, SOLUTION INTRAVENOUS; SUBCUTANEOUS ONCE
Status: COMPLETED | OUTPATIENT
Start: 2023-04-29 | End: 2023-04-29

## 2023-04-29 RX ORDER — SODIUM CHLORIDE 0.9 % (FLUSH) 0.9 %
5-40 SYRINGE (ML) INJECTION EVERY 12 HOURS SCHEDULED
Status: DISCONTINUED | OUTPATIENT
Start: 2023-04-29 | End: 2023-05-01 | Stop reason: SDUPTHER

## 2023-04-29 RX ORDER — DILTIAZEM HYDROCHLORIDE 5 MG/ML
10 INJECTION INTRAVENOUS ONCE
Status: COMPLETED | OUTPATIENT
Start: 2023-04-29 | End: 2023-04-29

## 2023-04-29 RX ORDER — ACETAMINOPHEN 650 MG/1
650 SUPPOSITORY RECTAL EVERY 6 HOURS PRN
Status: DISCONTINUED | OUTPATIENT
Start: 2023-04-29 | End: 2023-05-11 | Stop reason: HOSPADM

## 2023-04-29 RX ORDER — SODIUM CHLORIDE 9 MG/ML
INJECTION, SOLUTION INTRAVENOUS PRN
Status: DISCONTINUED | OUTPATIENT
Start: 2023-04-29 | End: 2023-05-11 | Stop reason: HOSPADM

## 2023-04-29 RX ORDER — ONDANSETRON 4 MG/1
4 TABLET, ORALLY DISINTEGRATING ORAL EVERY 8 HOURS PRN
Status: DISCONTINUED | OUTPATIENT
Start: 2023-04-29 | End: 2023-05-11 | Stop reason: HOSPADM

## 2023-04-29 RX ORDER — DILTIAZEM HYDROCHLORIDE 180 MG/1
360 CAPSULE, COATED, EXTENDED RELEASE ORAL DAILY
Status: DISCONTINUED | OUTPATIENT
Start: 2023-04-29 | End: 2023-04-30

## 2023-04-29 RX ORDER — LORAZEPAM 2 MG/ML
2 INJECTION INTRAMUSCULAR ONCE
Status: COMPLETED | OUTPATIENT
Start: 2023-04-29 | End: 2023-04-29

## 2023-04-29 RX ORDER — LORAZEPAM 1 MG/1
1 TABLET ORAL
Status: DISCONTINUED | OUTPATIENT
Start: 2023-04-29 | End: 2023-04-29 | Stop reason: SDUPTHER

## 2023-04-29 RX ORDER — LORAZEPAM 2 MG/ML
3 INJECTION INTRAMUSCULAR
Status: DISCONTINUED | OUTPATIENT
Start: 2023-04-29 | End: 2023-04-30

## 2023-04-29 RX ORDER — PRAVASTATIN SODIUM 40 MG
40 TABLET ORAL NIGHTLY
Status: DISCONTINUED | OUTPATIENT
Start: 2023-04-29 | End: 2023-04-30

## 2023-04-29 RX ORDER — 0.9 % SODIUM CHLORIDE 0.9 %
2000 INTRAVENOUS SOLUTION INTRAVENOUS ONCE
Status: COMPLETED | OUTPATIENT
Start: 2023-04-29 | End: 2023-04-29

## 2023-04-29 RX ORDER — LORAZEPAM 2 MG/ML
2 INJECTION INTRAMUSCULAR
Status: DISCONTINUED | OUTPATIENT
Start: 2023-04-29 | End: 2023-04-29 | Stop reason: SDUPTHER

## 2023-04-29 RX ORDER — ONDANSETRON 2 MG/ML
4 INJECTION INTRAMUSCULAR; INTRAVENOUS EVERY 6 HOURS PRN
Status: DISCONTINUED | OUTPATIENT
Start: 2023-04-29 | End: 2023-05-11 | Stop reason: HOSPADM

## 2023-04-29 RX ORDER — LORAZEPAM 1 MG/1
4 TABLET ORAL
Status: DISCONTINUED | OUTPATIENT
Start: 2023-04-29 | End: 2023-04-30

## 2023-04-29 RX ORDER — GAUZE BANDAGE 2" X 2"
100 BANDAGE TOPICAL DAILY
Status: DISCONTINUED | OUTPATIENT
Start: 2023-04-29 | End: 2023-04-29

## 2023-04-29 RX ORDER — ACETAMINOPHEN 325 MG/1
650 TABLET ORAL EVERY 6 HOURS PRN
Status: DISCONTINUED | OUTPATIENT
Start: 2023-04-29 | End: 2023-05-11 | Stop reason: HOSPADM

## 2023-04-29 RX ORDER — DEXTROSE MONOHYDRATE 100 MG/ML
INJECTION, SOLUTION INTRAVENOUS CONTINUOUS PRN
Status: DISCONTINUED | OUTPATIENT
Start: 2023-04-29 | End: 2023-05-11 | Stop reason: HOSPADM

## 2023-04-29 RX ORDER — LORAZEPAM 2 MG/ML
4 INJECTION INTRAMUSCULAR
Status: DISCONTINUED | OUTPATIENT
Start: 2023-04-29 | End: 2023-04-30

## 2023-04-29 RX ORDER — LORAZEPAM 1 MG/1
2 TABLET ORAL
Status: DISCONTINUED | OUTPATIENT
Start: 2023-04-29 | End: 2023-04-29 | Stop reason: SDUPTHER

## 2023-04-29 RX ORDER — SODIUM CHLORIDE 0.9 % (FLUSH) 0.9 %
5-40 SYRINGE (ML) INJECTION PRN
Status: DISCONTINUED | OUTPATIENT
Start: 2023-04-29 | End: 2023-05-11 | Stop reason: HOSPADM

## 2023-04-29 RX ORDER — PANTOPRAZOLE SODIUM 40 MG/1
40 TABLET, DELAYED RELEASE ORAL DAILY PRN
Status: DISCONTINUED | OUTPATIENT
Start: 2023-04-29 | End: 2023-05-11 | Stop reason: HOSPADM

## 2023-04-29 RX ORDER — FLUOXETINE HYDROCHLORIDE 20 MG/1
40 CAPSULE ORAL DAILY
Status: DISCONTINUED | OUTPATIENT
Start: 2023-04-30 | End: 2023-05-11 | Stop reason: HOSPADM

## 2023-04-29 RX ADMIN — FOLIC ACID 1 MG: 1 TABLET ORAL at 16:59

## 2023-04-29 RX ADMIN — INSULIN LISPRO 6 UNITS: 100 INJECTION, SOLUTION INTRAVENOUS; SUBCUTANEOUS at 16:57

## 2023-04-29 RX ADMIN — DILTIAZEM HYDROCHLORIDE 10 MG: 5 INJECTION INTRAVENOUS at 18:39

## 2023-04-29 RX ADMIN — TOPIRAMATE 100 MG: 100 TABLET, FILM COATED ORAL at 21:57

## 2023-04-29 RX ADMIN — LORAZEPAM 1 MG: 2 INJECTION INTRAMUSCULAR; INTRAVENOUS at 15:52

## 2023-04-29 RX ADMIN — SODIUM CHLORIDE, PRESERVATIVE FREE 10 ML: 5 INJECTION INTRAVENOUS at 14:10

## 2023-04-29 RX ADMIN — METOPROLOL TARTRATE 75 MG: 25 TABLET, FILM COATED ORAL at 19:48

## 2023-04-29 RX ADMIN — FONDAPARINUX SODIUM 10 MG: 10 INJECTION, SOLUTION SUBCUTANEOUS at 18:47

## 2023-04-29 RX ADMIN — THIAMINE HCL TAB 100 MG 100 MG: 100 TAB at 16:59

## 2023-04-29 RX ADMIN — SODIUM CHLORIDE: 9 INJECTION, SOLUTION INTRAVENOUS at 17:09

## 2023-04-29 RX ADMIN — INSULIN GLARGINE 35 UNITS: 100 INJECTION, SOLUTION SUBCUTANEOUS at 16:58

## 2023-04-29 RX ADMIN — BENZTROPINE MESYLATE 1 MG: 1 TABLET ORAL at 21:57

## 2023-04-29 RX ADMIN — OLANZAPINE 15 MG: 10 TABLET, FILM COATED ORAL at 19:48

## 2023-04-29 RX ADMIN — INSULIN LISPRO 14 UNITS: 100 INJECTION, SOLUTION INTRAVENOUS; SUBCUTANEOUS at 15:08

## 2023-04-29 RX ADMIN — ONDANSETRON 4 MG: 2 INJECTION INTRAMUSCULAR; INTRAVENOUS at 13:26

## 2023-04-29 RX ADMIN — PRAVASTATIN SODIUM 40 MG: 40 TABLET ORAL at 19:49

## 2023-04-29 RX ADMIN — SODIUM CHLORIDE 2000 ML: 9 INJECTION, SOLUTION INTRAVENOUS at 13:24

## 2023-04-29 RX ADMIN — SODIUM CHLORIDE 80 ML: 9 INJECTION, SOLUTION INTRAVENOUS at 14:10

## 2023-04-29 RX ADMIN — DIPHENHYDRAMINE HYDROCHLORIDE 25 MG: 50 INJECTION, SOLUTION INTRAMUSCULAR; INTRAVENOUS at 13:54

## 2023-04-29 RX ADMIN — IOPAMIDOL 75 ML: 755 INJECTION, SOLUTION INTRAVENOUS at 14:05

## 2023-04-29 RX ADMIN — LORAZEPAM 3 MG: 1 TABLET ORAL at 19:27

## 2023-04-29 RX ADMIN — ANTI-FUNGAL POWDER MICONAZOLE NITRATE TALC FREE: 1.42 POWDER TOPICAL at 21:57

## 2023-04-29 RX ADMIN — DILTIAZEM HYDROCHLORIDE 360 MG: 180 CAPSULE, COATED, EXTENDED RELEASE ORAL at 16:59

## 2023-04-29 RX ADMIN — LORAZEPAM 2 MG: 2 INJECTION INTRAMUSCULAR; INTRAVENOUS at 13:27

## 2023-04-29 RX ADMIN — LORAZEPAM 3 MG: 1 TABLET ORAL at 16:59

## 2023-04-29 ASSESSMENT — ENCOUNTER SYMPTOMS
ABDOMINAL DISTENTION: 0
ABDOMINAL PAIN: 0
SORE THROAT: 0
SHORTNESS OF BREATH: 1
SINUS PRESSURE: 0
DIARRHEA: 0
COLOR CHANGE: 0
EYE PAIN: 0
COUGH: 0
CONSTIPATION: 0
SHORTNESS OF BREATH: 0
FACIAL SWELLING: 0
VOMITING: 0
APNEA: 0
TROUBLE SWALLOWING: 0
CHEST TIGHTNESS: 1
RHINORRHEA: 0
BLOOD IN STOOL: 0
CHEST TIGHTNESS: 0
BACK PAIN: 0
EYE DISCHARGE: 0
WHEEZING: 0
EYE REDNESS: 0
NAUSEA: 0

## 2023-04-29 ASSESSMENT — PAIN DESCRIPTION - LOCATION: LOCATION: BACK;HEAD

## 2023-04-29 ASSESSMENT — PAIN SCALES - GENERAL: PAINLEVEL_OUTOF10: 6

## 2023-04-29 ASSESSMENT — HEART SCORE: ECG: 0

## 2023-04-29 ASSESSMENT — PAIN DESCRIPTION - DESCRIPTORS: DESCRIPTORS: ACHING

## 2023-04-30 ENCOUNTER — APPOINTMENT (OUTPATIENT)
Dept: GENERAL RADIOLOGY | Age: 43
DRG: 896 | End: 2023-04-30
Payer: MEDICARE

## 2023-04-30 LAB
ABSOLUTE EOS #: 0 K/UL (ref 0–0.4)
ABSOLUTE LYMPH #: 1.4 K/UL (ref 1–4.8)
ABSOLUTE MONO #: 0.2 K/UL (ref 0.1–1.3)
ALBUMIN SERPL-MCNC: 2.5 G/DL (ref 3.5–5.2)
ALLEN TEST: ABNORMAL
ALLEN TEST: ABNORMAL
ALP SERPL-CCNC: 260 U/L (ref 35–104)
ALT SERPL-CCNC: 107 U/L (ref 5–33)
AMPHETAMINE SCREEN URINE: NEGATIVE
ANION GAP SERPL CALCULATED.3IONS-SCNC: 7 MMOL/L (ref 9–17)
AST SERPL-CCNC: 238 U/L
BACTERIA: ABNORMAL
BARBITURATE SCREEN URINE: NEGATIVE
BASOPHILS # BLD: 1 % (ref 0–2)
BASOPHILS ABSOLUTE: 0 K/UL (ref 0–0.2)
BENZODIAZEPINE SCREEN, URINE: NEGATIVE
BILIRUB SERPL-MCNC: 0.6 MG/DL (ref 0.3–1.2)
BILIRUBIN URINE: NEGATIVE
BUN SERPL-MCNC: 6 MG/DL (ref 6–20)
CALCIUM SERPL-MCNC: 7.3 MG/DL (ref 8.6–10.4)
CANNABINOID SCREEN URINE: NEGATIVE
CARBOXYHEMOGLOBIN: 1.2 % (ref 0–5)
CASTS UA: ABNORMAL /LPF
CHLORIDE SERPL-SCNC: 102 MMOL/L (ref 98–107)
CO2 SERPL-SCNC: 25 MMOL/L (ref 20–31)
COCAINE METABOLITE, URINE: NEGATIVE
COLOR: YELLOW
CORTISOL: 27.5 UG/DL (ref 2.7–18.4)
CREAT SERPL-MCNC: 0.4 MG/DL (ref 0.5–0.9)
EOSINOPHILS RELATIVE PERCENT: 0 % (ref 0–4)
EPITHELIAL CELLS UA: ABNORMAL /HPF
FENTANYL URINE: NEGATIVE
FIO2: 50
GFR SERPL CREATININE-BSD FRML MDRD: >60 ML/MIN/1.73M2
GLUCOSE BLD-MCNC: 138 MG/DL (ref 65–105)
GLUCOSE BLD-MCNC: 15 MG/DL (ref 65–105)
GLUCOSE BLD-MCNC: 172 MG/DL (ref 65–105)
GLUCOSE BLD-MCNC: 173 MG/DL (ref 65–105)
GLUCOSE BLD-MCNC: 250 MG/DL (ref 65–105)
GLUCOSE BLD-MCNC: 52 MG/DL (ref 65–105)
GLUCOSE BLD-MCNC: 66 MG/DL (ref 65–105)
GLUCOSE BLD-MCNC: 66 MG/DL (ref 65–105)
GLUCOSE BLD-MCNC: 69 MG/DL (ref 65–105)
GLUCOSE BLD-MCNC: 75 MG/DL (ref 65–105)
GLUCOSE BLD-MCNC: 79 MG/DL (ref 65–105)
GLUCOSE BLD-MCNC: 83 MG/DL (ref 65–105)
GLUCOSE SERPL-MCNC: 196 MG/DL (ref 70–99)
GLUCOSE UR STRIP.AUTO-MCNC: ABNORMAL MG/DL
HCO3 ARTERIAL: 26.2 MMOL/L (ref 22–26)
HCO3 ARTERIAL: 29.5 MMOL/L (ref 22–26)
HCT VFR BLD AUTO: 32.7 % (ref 36–46)
HGB BLD-MCNC: 10.7 G/DL (ref 12–16)
HIV 1+2 AB+HIV1 P24 AG SERPL QL IA: NONREACTIVE
KETONES UR STRIP.AUTO-MCNC: NEGATIVE MG/DL
LACTATE PLASV-SCNC: 1.7 MMOL/L (ref 0.5–2.2)
LEUKOCYTE ESTERASE UR QL STRIP.AUTO: ABNORMAL
LYMPHOCYTES # BLD: 17 % (ref 24–44)
MCH RBC QN AUTO: 31 PG (ref 26–34)
MCHC RBC AUTO-ENTMCNC: 32.8 G/DL (ref 31–37)
MCV RBC AUTO: 94.6 FL (ref 80–100)
METHADONE SCREEN, URINE: NEGATIVE
METHEMOGLOBIN: 1.4 % (ref 0–1.9)
METHEMOGLOBIN: 1.8 % (ref 0–1.9)
MODE: ABNORMAL
MONOCYTES # BLD: 2 % (ref 1–7)
NITRITE UR QL STRIP.AUTO: NEGATIVE
O2 DEVICE/FLOW/%: ABNORMAL
O2 DEVICE/FLOW/%: ABNORMAL
O2 SAT, ARTERIAL: 65.9 % (ref 95–98)
O2 SAT, ARTERIAL: 96.4 % (ref 95–98)
OPIATES, URINE: NEGATIVE
OXYCODONE SCREEN URINE: POSITIVE
PATIENT TEMP: 37
PATIENT TEMP: 37
PCO2 ARTERIAL: 36.7 MMHG (ref 35–45)
PCO2 ARTERIAL: 51.9 MMHG (ref 35–45)
PDW BLD-RTO: 15.8 % (ref 11.5–14.9)
PEEP/CPAP: 8
PH ARTERIAL: 7.36 (ref 7.35–7.45)
PH ARTERIAL: 7.46 (ref 7.35–7.45)
PHENCYCLIDINE, URINE: NEGATIVE
PLATELET # BLD AUTO: 211 K/UL (ref 150–450)
PMV BLD AUTO: 9.7 FL (ref 6–12)
PO2 ARTERIAL: 239 MMHG (ref 80–100)
PO2 ARTERIAL: 36.4 MMHG (ref 80–100)
POSITIVE BASE EXCESS, ART: 2.4 MMOL/L (ref 0–2)
POSITIVE BASE EXCESS, ART: 4.1 MMOL/L (ref 0–2)
POTASSIUM SERPL-SCNC: 3.9 MMOL/L (ref 3.7–5.3)
PROT SERPL-MCNC: 4.3 G/DL (ref 6.4–8.3)
PROT UR STRIP.AUTO-MCNC: 6 MG/DL (ref 5–8)
PROT UR STRIP.AUTO-MCNC: ABNORMAL MG/DL
PT. POSITION: ABNORMAL
PT. POSITION: ABNORMAL
RBC # BLD: 3.45 M/UL (ref 4–5.2)
RBC CLUMPS #/AREA URNS AUTO: ABNORMAL /HPF
RESPIRATORY RATE: 16
RESPIRATORY RATE: 30
SAMPLE SITE: ABNORMAL
SAMPLE SITE: ABNORMAL
SARS-COV-2 RDRP RESP QL NAA+PROBE: NOT DETECTED
SEG NEUTROPHILS: 80 % (ref 36–66)
SEGMENTED NEUTROPHILS ABSOLUTE COUNT: 6.9 K/UL (ref 1.3–9.1)
SET RATE: 24
SODIUM SERPL-SCNC: 134 MMOL/L (ref 135–144)
SPECIFIC GRAVITY UA: 1.02 (ref 1–1.03)
SPECIMEN DESCRIPTION: NORMAL
TEST INFORMATION: ABNORMAL
TEXT FOR RESPIRATORY: ABNORMAL
TEXT FOR RESPIRATORY: ABNORMAL
TOTAL RATE: 30
TSH SERPL-ACNC: 1.5 UIU/ML (ref 0.3–5)
TURBIDITY: ABNORMAL
URINE HGB: ABNORMAL
UROBILINOGEN, URINE: NORMAL
VT: 500
WBC # BLD AUTO: 8.5 K/UL (ref 3.5–11)
WBC UA: ABNORMAL /HPF

## 2023-04-30 PROCEDURE — 87635 SARS-COV-2 COVID-19 AMP PRB: CPT

## 2023-04-30 PROCEDURE — 87186 SC STD MICRODIL/AGAR DIL: CPT

## 2023-04-30 PROCEDURE — 71045 X-RAY EXAM CHEST 1 VIEW: CPT

## 2023-04-30 PROCEDURE — 80307 DRUG TEST PRSMV CHEM ANLYZR: CPT

## 2023-04-30 PROCEDURE — 2000000000 HC ICU R&B

## 2023-04-30 PROCEDURE — G0480 DRUG TEST DEF 1-7 CLASSES: HCPCS

## 2023-04-30 PROCEDURE — 82805 BLOOD GASES W/O2 SATURATION: CPT

## 2023-04-30 PROCEDURE — 6360000002 HC RX W HCPCS: Performed by: INTERNAL MEDICINE

## 2023-04-30 PROCEDURE — 6370000000 HC RX 637 (ALT 250 FOR IP)

## 2023-04-30 PROCEDURE — 2580000003 HC RX 258

## 2023-04-30 PROCEDURE — 99291 CRITICAL CARE FIRST HOUR: CPT | Performed by: INTERNAL MEDICINE

## 2023-04-30 PROCEDURE — 82947 ASSAY GLUCOSE BLOOD QUANT: CPT

## 2023-04-30 PROCEDURE — 36415 COLL VENOUS BLD VENIPUNCTURE: CPT

## 2023-04-30 PROCEDURE — 81001 URINALYSIS AUTO W/SCOPE: CPT

## 2023-04-30 PROCEDURE — 87086 URINE CULTURE/COLONY COUNT: CPT

## 2023-04-30 PROCEDURE — 80053 COMPREHEN METABOLIC PANEL: CPT

## 2023-04-30 PROCEDURE — 36600 WITHDRAWAL OF ARTERIAL BLOOD: CPT

## 2023-04-30 PROCEDURE — 6360000002 HC RX W HCPCS

## 2023-04-30 PROCEDURE — 31500 INSERT EMERGENCY AIRWAY: CPT

## 2023-04-30 PROCEDURE — 87070 CULTURE OTHR SPECIMN AEROBIC: CPT

## 2023-04-30 PROCEDURE — 2580000003 HC RX 258: Performed by: INTERNAL MEDICINE

## 2023-04-30 PROCEDURE — 2500000003 HC RX 250 WO HCPCS

## 2023-04-30 PROCEDURE — 5A1945Z RESPIRATORY VENTILATION, 24-96 CONSECUTIVE HOURS: ICD-10-PCS | Performed by: INTERNAL MEDICINE

## 2023-04-30 PROCEDURE — 2500000003 HC RX 250 WO HCPCS: Performed by: INTERNAL MEDICINE

## 2023-04-30 PROCEDURE — 6370000000 HC RX 637 (ALT 250 FOR IP): Performed by: INTERNAL MEDICINE

## 2023-04-30 PROCEDURE — 85025 COMPLETE CBC W/AUTO DIFF WBC: CPT

## 2023-04-30 PROCEDURE — 82533 TOTAL CORTISOL: CPT

## 2023-04-30 PROCEDURE — A4216 STERILE WATER/SALINE, 10 ML: HCPCS | Performed by: INTERNAL MEDICINE

## 2023-04-30 PROCEDURE — 84443 ASSAY THYROID STIM HORMONE: CPT

## 2023-04-30 PROCEDURE — 87205 SMEAR GRAM STAIN: CPT

## 2023-04-30 PROCEDURE — 83605 ASSAY OF LACTIC ACID: CPT

## 2023-04-30 PROCEDURE — 0BH17EZ INSERTION OF ENDOTRACHEAL AIRWAY INTO TRACHEA, VIA NATURAL OR ARTIFICIAL OPENING: ICD-10-PCS | Performed by: INTERNAL MEDICINE

## 2023-04-30 PROCEDURE — 51702 INSERT TEMP BLADDER CATH: CPT

## 2023-04-30 PROCEDURE — 87077 CULTURE AEROBIC IDENTIFY: CPT

## 2023-04-30 PROCEDURE — 86403 PARTICLE AGGLUT ANTBDY SCRN: CPT

## 2023-04-30 PROCEDURE — 94002 VENT MGMT INPAT INIT DAY: CPT

## 2023-04-30 PROCEDURE — 87389 HIV-1 AG W/HIV-1&-2 AB AG IA: CPT

## 2023-04-30 RX ORDER — DILTIAZEM HYDROCHLORIDE 120 MG/1
120 CAPSULE, COATED, EXTENDED RELEASE ORAL DAILY
Status: DISCONTINUED | OUTPATIENT
Start: 2023-05-01 | End: 2023-05-04

## 2023-04-30 RX ORDER — OLANZAPINE 10 MG/1
15 TABLET ORAL NIGHTLY
Status: DISCONTINUED | OUTPATIENT
Start: 2023-04-30 | End: 2023-05-11 | Stop reason: HOSPADM

## 2023-04-30 RX ORDER — LORAZEPAM 2 MG/ML
2 INJECTION INTRAMUSCULAR
Status: DISCONTINUED | OUTPATIENT
Start: 2023-04-30 | End: 2023-05-11 | Stop reason: HOSPADM

## 2023-04-30 RX ORDER — TOPIRAMATE 100 MG/1
100 TABLET, FILM COATED ORAL NIGHTLY
Status: DISCONTINUED | OUTPATIENT
Start: 2023-04-30 | End: 2023-05-11 | Stop reason: HOSPADM

## 2023-04-30 RX ORDER — NALOXONE HYDROCHLORIDE 1 MG/ML
INJECTION INTRAMUSCULAR; INTRAVENOUS; SUBCUTANEOUS
Status: COMPLETED
Start: 2023-04-30 | End: 2023-04-30

## 2023-04-30 RX ORDER — INSULIN LISPRO 100 [IU]/ML
0-8 INJECTION, SOLUTION INTRAVENOUS; SUBCUTANEOUS EVERY 4 HOURS
Status: DISCONTINUED | OUTPATIENT
Start: 2023-04-30 | End: 2023-05-11 | Stop reason: HOSPADM

## 2023-04-30 RX ORDER — ALBUTEROL SULFATE 2.5 MG/3ML
2.5 SOLUTION RESPIRATORY (INHALATION) EVERY 4 HOURS PRN
Status: DISCONTINUED | OUTPATIENT
Start: 2023-04-30 | End: 2023-05-11 | Stop reason: HOSPADM

## 2023-04-30 RX ORDER — DEXTROSE AND SODIUM CHLORIDE 5; .9 G/100ML; G/100ML
INJECTION, SOLUTION INTRAVENOUS CONTINUOUS
Status: DISCONTINUED | OUTPATIENT
Start: 2023-04-30 | End: 2023-05-02

## 2023-04-30 RX ORDER — FLUCONAZOLE 2 MG/ML
200 INJECTION, SOLUTION INTRAVENOUS EVERY 24 HOURS
Status: COMPLETED | OUTPATIENT
Start: 2023-04-30 | End: 2023-05-04

## 2023-04-30 RX ORDER — INSULIN GLARGINE 100 [IU]/ML
28 INJECTION, SOLUTION SUBCUTANEOUS DAILY
Status: DISCONTINUED | OUTPATIENT
Start: 2023-04-30 | End: 2023-05-04

## 2023-04-30 RX ORDER — BENZTROPINE MESYLATE 1 MG/1
1 TABLET ORAL 2 TIMES DAILY
Status: DISCONTINUED | OUTPATIENT
Start: 2023-04-30 | End: 2023-05-11 | Stop reason: HOSPADM

## 2023-04-30 RX ORDER — FENTANYL CITRATE 0.05 MG/ML
50 INJECTION, SOLUTION INTRAMUSCULAR; INTRAVENOUS
Status: DISCONTINUED | OUTPATIENT
Start: 2023-04-30 | End: 2023-05-11 | Stop reason: HOSPADM

## 2023-04-30 RX ORDER — CHLORDIAZEPOXIDE HYDROCHLORIDE 25 MG/1
50 CAPSULE, GELATIN COATED ORAL EVERY 6 HOURS
Status: DISCONTINUED | OUTPATIENT
Start: 2023-04-30 | End: 2023-05-03

## 2023-04-30 RX ORDER — NALOXONE HYDROCHLORIDE 0.4 MG/ML
0.4 INJECTION, SOLUTION INTRAMUSCULAR; INTRAVENOUS; SUBCUTANEOUS ONCE
Status: DISCONTINUED | OUTPATIENT
Start: 2023-04-30 | End: 2023-05-11 | Stop reason: HOSPADM

## 2023-04-30 RX ORDER — PRAVASTATIN SODIUM 40 MG
40 TABLET ORAL NIGHTLY
Status: DISCONTINUED | OUTPATIENT
Start: 2023-04-30 | End: 2023-05-11 | Stop reason: HOSPADM

## 2023-04-30 RX ORDER — NALOXONE HYDROCHLORIDE 0.4 MG/ML
0.4 INJECTION, SOLUTION INTRAMUSCULAR; INTRAVENOUS; SUBCUTANEOUS ONCE
Status: DISCONTINUED | OUTPATIENT
Start: 2023-04-30 | End: 2023-05-04

## 2023-04-30 RX ORDER — FENTANYL CITRATE-0.9 % NACL/PF 10 MCG/ML
25-200 PLASTIC BAG, INJECTION (ML) INTRAVENOUS CONTINUOUS
Status: DISCONTINUED | OUTPATIENT
Start: 2023-04-30 | End: 2023-05-03

## 2023-04-30 RX ORDER — CHLORDIAZEPOXIDE HYDROCHLORIDE 25 MG/1
25 CAPSULE, GELATIN COATED ORAL EVERY 6 HOURS
Status: DISCONTINUED | OUTPATIENT
Start: 2023-04-30 | End: 2023-04-30

## 2023-04-30 RX ORDER — INSULIN GLARGINE 300 U/ML
35 INJECTION, SOLUTION SUBCUTANEOUS DAILY
Status: ON HOLD | COMMUNITY

## 2023-04-30 RX ORDER — CHLORHEXIDINE GLUCONATE 0.12 MG/ML
15 RINSE ORAL 2 TIMES DAILY
Status: DISCONTINUED | OUTPATIENT
Start: 2023-04-30 | End: 2023-05-03

## 2023-04-30 RX ORDER — NALOXONE HYDROCHLORIDE 0.4 MG/ML
INJECTION, SOLUTION INTRAMUSCULAR; INTRAVENOUS; SUBCUTANEOUS
Status: DISPENSED
Start: 2023-04-30 | End: 2023-04-30

## 2023-04-30 RX ORDER — NALOXONE HYDROCHLORIDE 1 MG/ML
INJECTION INTRAMUSCULAR; INTRAVENOUS; SUBCUTANEOUS
Status: DISPENSED
Start: 2023-04-30 | End: 2023-04-30

## 2023-04-30 RX ORDER — PROPOFOL 10 MG/ML
10 INJECTION, EMULSION INTRAVENOUS CONTINUOUS
Status: DISCONTINUED | OUTPATIENT
Start: 2023-04-30 | End: 2023-05-03

## 2023-04-30 RX ADMIN — DEXTROSE MONOHYDRATE 125 ML: 100 INJECTION, SOLUTION INTRAVENOUS at 15:20

## 2023-04-30 RX ADMIN — DILTIAZEM HYDROCHLORIDE 360 MG: 180 CAPSULE, COATED, EXTENDED RELEASE ORAL at 10:15

## 2023-04-30 RX ADMIN — PRAVASTATIN SODIUM 40 MG: 40 TABLET ORAL at 20:34

## 2023-04-30 RX ADMIN — SODIUM CHLORIDE: 9 INJECTION, SOLUTION INTRAVENOUS at 04:31

## 2023-04-30 RX ADMIN — TOPIRAMATE 100 MG: 100 TABLET, FILM COATED ORAL at 20:32

## 2023-04-30 RX ADMIN — DEXTROSE AND SODIUM CHLORIDE: 5; 900 INJECTION, SOLUTION INTRAVENOUS at 13:48

## 2023-04-30 RX ADMIN — NALOXONE HYDROCHLORIDE 2 MG: 1 INJECTION PARENTERAL at 07:06

## 2023-04-30 RX ADMIN — Medication 150 MCG/HR: at 23:09

## 2023-04-30 RX ADMIN — CHLORHEXIDINE GLUCONATE 0.12% ORAL RINSE 15 ML: 1.2 LIQUID ORAL at 20:29

## 2023-04-30 RX ADMIN — Medication 50 MCG/HR: at 17:42

## 2023-04-30 RX ADMIN — FAMOTIDINE 20 MG: 10 INJECTION, SOLUTION INTRAVENOUS at 20:30

## 2023-04-30 RX ADMIN — CHLORHEXIDINE GLUCONATE 0.12% ORAL RINSE 15 ML: 1.2 LIQUID ORAL at 10:30

## 2023-04-30 RX ADMIN — DEXTROSE MONOHYDRATE 125 ML: 100 INJECTION, SOLUTION INTRAVENOUS at 09:58

## 2023-04-30 RX ADMIN — ANTI-FUNGAL POWDER MICONAZOLE NITRATE TALC FREE: 1.42 POWDER TOPICAL at 20:29

## 2023-04-30 RX ADMIN — CHLORDIAZEPOXIDE HYDROCHLORIDE 25 MG: 25 CAPSULE ORAL at 15:00

## 2023-04-30 RX ADMIN — PROPOFOL 40 MCG/KG/MIN: 10 INJECTION, EMULSION INTRAVENOUS at 11:33

## 2023-04-30 RX ADMIN — CHLORDIAZEPOXIDE HYDROCHLORIDE 50 MG: 25 CAPSULE ORAL at 20:29

## 2023-04-30 RX ADMIN — FENTANYL CITRATE 50 MCG: 0.05 INJECTION, SOLUTION INTRAMUSCULAR; INTRAVENOUS at 16:48

## 2023-04-30 RX ADMIN — FLUCONAZOLE 200 MG: 200 INJECTION, SOLUTION INTRAVENOUS at 13:51

## 2023-04-30 RX ADMIN — ANTI-FUNGAL POWDER MICONAZOLE NITRATE TALC FREE: 1.42 POWDER TOPICAL at 10:26

## 2023-04-30 RX ADMIN — DEXTROSE MONOHYDRATE 125 ML: 100 INJECTION, SOLUTION INTRAVENOUS at 09:23

## 2023-04-30 RX ADMIN — SODIUM CHLORIDE, PRESERVATIVE FREE 10 ML: 5 INJECTION INTRAVENOUS at 08:00

## 2023-04-30 RX ADMIN — PROPOFOL 25 MCG/KG/MIN: 10 INJECTION, EMULSION INTRAVENOUS at 23:17

## 2023-04-30 RX ADMIN — FOLIC ACID: 5 INJECTION, SOLUTION INTRAMUSCULAR; INTRAVENOUS; SUBCUTANEOUS at 10:16

## 2023-04-30 RX ADMIN — PIPERACILLIN AND TAZOBACTAM 3375 MG: 3; .375 INJECTION, POWDER, LYOPHILIZED, FOR SOLUTION INTRAVENOUS at 14:30

## 2023-04-30 RX ADMIN — PROPOFOL 10 MCG/KG/MIN: 10 INJECTION, EMULSION INTRAVENOUS at 07:46

## 2023-04-30 RX ADMIN — FONDAPARINUX SODIUM 10 MG: 10 INJECTION, SOLUTION SUBCUTANEOUS at 10:31

## 2023-04-30 RX ADMIN — DEXTROSE MONOHYDRATE 125 ML: 100 INJECTION, SOLUTION INTRAVENOUS at 18:24

## 2023-04-30 RX ADMIN — METOPROLOL TARTRATE 75 MG: 25 TABLET, FILM COATED ORAL at 20:31

## 2023-04-30 RX ADMIN — PROPOFOL 50 MCG/KG/MIN: 10 INJECTION, EMULSION INTRAVENOUS at 14:44

## 2023-04-30 RX ADMIN — SODIUM CHLORIDE: 9 INJECTION, SOLUTION INTRAVENOUS at 08:54

## 2023-04-30 RX ADMIN — OLANZAPINE 15 MG: 15 TABLET, FILM COATED ORAL at 20:32

## 2023-04-30 RX ADMIN — PIPERACILLIN AND TAZOBACTAM 4500 MG: 4; .5 INJECTION, POWDER, FOR SOLUTION INTRAVENOUS at 11:50

## 2023-04-30 RX ADMIN — DEXTROSE MONOHYDRATE 250 ML: 100 INJECTION, SOLUTION INTRAVENOUS at 06:36

## 2023-04-30 RX ADMIN — BENZTROPINE MESYLATE 1 MG: 1 TABLET ORAL at 20:29

## 2023-04-30 RX ADMIN — SODIUM CHLORIDE, PRESERVATIVE FREE 10 ML: 5 INJECTION INTRAVENOUS at 20:33

## 2023-04-30 RX ADMIN — FAMOTIDINE 20 MG: 10 INJECTION, SOLUTION INTRAVENOUS at 10:14

## 2023-04-30 RX ADMIN — PIPERACILLIN AND TAZOBACTAM 3375 MG: 3; .375 INJECTION, POWDER, LYOPHILIZED, FOR SOLUTION INTRAVENOUS at 22:00

## 2023-04-30 RX ADMIN — GLUCAGON 1 MG: KIT at 06:38

## 2023-04-30 ASSESSMENT — PULMONARY FUNCTION TESTS
PIF_VALUE: 17
PIF_VALUE: 13
PIF_VALUE: 13
PIF_VALUE: 12
PIF_VALUE: 15
PIF_VALUE: 22
PIF_VALUE: 23
PIF_VALUE: 9
PIF_VALUE: 17
PIF_VALUE: 15
PIF_VALUE: 14
PIF_VALUE: 12
PIF_VALUE: 12
PIF_VALUE: 21
PIF_VALUE: 17
PIF_VALUE: 13
PIF_VALUE: 17
PIF_VALUE: 13
PIF_VALUE: 13
PIF_VALUE: 10
PIF_VALUE: 19
PIF_VALUE: 16
PIF_VALUE: 8
PIF_VALUE: 12
PIF_VALUE: 11
PIF_VALUE: 10
PIF_VALUE: 9
PIF_VALUE: 15
PIF_VALUE: 10
PIF_VALUE: 18
PIF_VALUE: 13
PIF_VALUE: 20
PIF_VALUE: 10

## 2023-04-30 ASSESSMENT — PAIN SCALES - GENERAL: PAINLEVEL_OUTOF10: 4

## 2023-05-01 ENCOUNTER — APPOINTMENT (OUTPATIENT)
Dept: GENERAL RADIOLOGY | Age: 43
DRG: 896 | End: 2023-05-01
Payer: MEDICARE

## 2023-05-01 PROBLEM — E44.1 MILD MALNUTRITION (HCC): Status: ACTIVE | Noted: 2023-05-01

## 2023-05-01 LAB
25(OH)D3 SERPL-MCNC: 14.9 NG/ML
ABSOLUTE EOS #: 0.14 K/UL (ref 0–0.4)
ABSOLUTE LYMPH #: 0.86 K/UL (ref 1–4.8)
ABSOLUTE MONO #: 0.14 K/UL (ref 0.1–1.3)
ALBUMIN SERPL-MCNC: 2.3 G/DL (ref 3.5–5.2)
ALLEN TEST: ABNORMAL
ALP SERPL-CCNC: 270 U/L (ref 35–104)
ALT SERPL-CCNC: 105 U/L (ref 5–33)
AMMONIA PLAS-SCNC: 38 UMOL/L (ref 11–51)
ANION GAP SERPL CALCULATED.3IONS-SCNC: 8 MMOL/L (ref 9–17)
AST SERPL-CCNC: 208 U/L
BASOPHILS # BLD: 0 % (ref 0–2)
BASOPHILS ABSOLUTE: 0 K/UL (ref 0–0.2)
BILIRUB SERPL-MCNC: 0.6 MG/DL (ref 0.3–1.2)
BUN SERPL-MCNC: 4 MG/DL (ref 6–20)
CALCIUM SERPL-MCNC: 7.4 MG/DL (ref 8.6–10.4)
CARBOXYHEMOGLOBIN: <1 % (ref 0–5)
CHLORIDE SERPL-SCNC: 105 MMOL/L (ref 98–107)
CO2 SERPL-SCNC: 24 MMOL/L (ref 20–31)
CREAT SERPL-MCNC: 0.51 MG/DL (ref 0.5–0.9)
D DIMER BLD IA.RAPID-MCNC: 0.95 UG/ML FEU (ref 0–0.59)
EKG ATRIAL RATE: 121 BPM
EKG ATRIAL RATE: 126 BPM
EKG P AXIS: 60 DEGREES
EKG P AXIS: 63 DEGREES
EKG P-R INTERVAL: 132 MS
EKG P-R INTERVAL: 132 MS
EKG Q-T INTERVAL: 328 MS
EKG Q-T INTERVAL: 342 MS
EKG QRS DURATION: 80 MS
EKG QRS DURATION: 80 MS
EKG QTC CALCULATION (BAZETT): 475 MS
EKG QTC CALCULATION (BAZETT): 485 MS
EKG R AXIS: 71 DEGREES
EKG R AXIS: 72 DEGREES
EKG T AXIS: 66 DEGREES
EKG T AXIS: 76 DEGREES
EKG VENTRICULAR RATE: 121 BPM
EKG VENTRICULAR RATE: 126 BPM
EOSINOPHILS RELATIVE PERCENT: 1 % (ref 0–4)
FIO2: ABNORMAL
GFR SERPL CREATININE-BSD FRML MDRD: >60 ML/MIN/1.73M2
GLUCOSE BLD-MCNC: 120 MG/DL (ref 65–105)
GLUCOSE BLD-MCNC: 161 MG/DL (ref 65–105)
GLUCOSE BLD-MCNC: 174 MG/DL (ref 65–105)
GLUCOSE BLD-MCNC: 215 MG/DL (ref 65–105)
GLUCOSE BLD-MCNC: 51 MG/DL (ref 65–105)
GLUCOSE SERPL-MCNC: 110 MG/DL (ref 70–99)
GLUCOSE SERPL-MCNC: 122 MG/DL (ref 70–99)
HCO3 ARTERIAL: 26.2 MMOL/L (ref 22–26)
HCT VFR BLD AUTO: 35 % (ref 36–46)
HGB BLD-MCNC: 11.7 G/DL (ref 12–16)
LYMPHOCYTES # BLD: 6 % (ref 24–44)
MAGNESIUM SERPL-MCNC: 1.9 MG/DL (ref 1.6–2.6)
MCH RBC QN AUTO: 31.9 PG (ref 26–34)
MCHC RBC AUTO-ENTMCNC: 33.3 G/DL (ref 31–37)
MCV RBC AUTO: 95.8 FL (ref 80–100)
METHEMOGLOBIN: 1.7 % (ref 0–1.9)
MODE: ABNORMAL
MONOCYTES # BLD: 1 % (ref 1–7)
MORPHOLOGY: ABNORMAL
MORPHOLOGY: ABNORMAL
O2 DEVICE/FLOW/%: ABNORMAL
O2 SAT, ARTERIAL: 92.4 % (ref 95–98)
PATIENT TEMP: 37
PCO2 ARTERIAL: 36.1 MMHG (ref 35–45)
PDW BLD-RTO: 15.9 % (ref 11.5–14.9)
PEEP/CPAP: 8
PH ARTERIAL: 7.47 (ref 7.35–7.45)
PLATELET # BLD AUTO: 147 K/UL (ref 150–450)
PMV BLD AUTO: 10.6 FL (ref 6–12)
PO2 ARTERIAL: 70.3 MMHG (ref 80–100)
POSITIVE BASE EXCESS, ART: 2.5 MMOL/L (ref 0–2)
POTASSIUM SERPL-SCNC: 3.4 MMOL/L (ref 3.7–5.3)
PROT SERPL-MCNC: 4.8 G/DL (ref 6.4–8.3)
PT. POSITION: ABNORMAL
RBC # BLD: 3.66 M/UL (ref 4–5.2)
SAMPLE SITE: ABNORMAL
SEG NEUTROPHILS: 92 % (ref 36–66)
SEGMENTED NEUTROPHILS ABSOLUTE COUNT: 13.26 K/UL (ref 1.3–9.1)
SET RATE: 20
SODIUM SERPL-SCNC: 137 MMOL/L (ref 135–144)
TEXT FOR RESPIRATORY: ABNORMAL
TOTAL RATE: 25
VT: 500
WBC # BLD AUTO: 14.4 K/UL (ref 3.5–11)

## 2023-05-01 PROCEDURE — 82306 VITAMIN D 25 HYDROXY: CPT

## 2023-05-01 PROCEDURE — 6360000002 HC RX W HCPCS

## 2023-05-01 PROCEDURE — 80053 COMPREHEN METABOLIC PANEL: CPT

## 2023-05-01 PROCEDURE — 2000000000 HC ICU R&B

## 2023-05-01 PROCEDURE — A4216 STERILE WATER/SALINE, 10 ML: HCPCS | Performed by: INTERNAL MEDICINE

## 2023-05-01 PROCEDURE — 36415 COLL VENOUS BLD VENIPUNCTURE: CPT

## 2023-05-01 PROCEDURE — 82947 ASSAY GLUCOSE BLOOD QUANT: CPT

## 2023-05-01 PROCEDURE — 6370000000 HC RX 637 (ALT 250 FOR IP)

## 2023-05-01 PROCEDURE — 94761 N-INVAS EAR/PLS OXIMETRY MLT: CPT

## 2023-05-01 PROCEDURE — 2500000003 HC RX 250 WO HCPCS: Performed by: INTERNAL MEDICINE

## 2023-05-01 PROCEDURE — 85379 FIBRIN DEGRADATION QUANT: CPT

## 2023-05-01 PROCEDURE — 6370000000 HC RX 637 (ALT 250 FOR IP): Performed by: INTERNAL MEDICINE

## 2023-05-01 PROCEDURE — 83735 ASSAY OF MAGNESIUM: CPT

## 2023-05-01 PROCEDURE — 82805 BLOOD GASES W/O2 SATURATION: CPT

## 2023-05-01 PROCEDURE — 2700000000 HC OXYGEN THERAPY PER DAY

## 2023-05-01 PROCEDURE — 71045 X-RAY EXAM CHEST 1 VIEW: CPT

## 2023-05-01 PROCEDURE — 6360000002 HC RX W HCPCS: Performed by: INTERNAL MEDICINE

## 2023-05-01 PROCEDURE — 2580000003 HC RX 258

## 2023-05-01 PROCEDURE — 93010 ELECTROCARDIOGRAM REPORT: CPT | Performed by: INTERNAL MEDICINE

## 2023-05-01 PROCEDURE — 85025 COMPLETE CBC W/AUTO DIFF WBC: CPT

## 2023-05-01 PROCEDURE — 99291 CRITICAL CARE FIRST HOUR: CPT | Performed by: INTERNAL MEDICINE

## 2023-05-01 PROCEDURE — 99213 OFFICE O/P EST LOW 20 MIN: CPT

## 2023-05-01 PROCEDURE — 82140 ASSAY OF AMMONIA: CPT

## 2023-05-01 PROCEDURE — 2500000003 HC RX 250 WO HCPCS

## 2023-05-01 PROCEDURE — 2580000003 HC RX 258: Performed by: INTERNAL MEDICINE

## 2023-05-01 PROCEDURE — 36600 WITHDRAWAL OF ARTERIAL BLOOD: CPT

## 2023-05-01 PROCEDURE — 94003 VENT MGMT INPAT SUBQ DAY: CPT

## 2023-05-01 RX ORDER — MAGNESIUM SULFATE HEPTAHYDRATE 40 MG/ML
2000 INJECTION, SOLUTION INTRAVENOUS PRN
Status: DISCONTINUED | OUTPATIENT
Start: 2023-05-01 | End: 2023-05-11 | Stop reason: HOSPADM

## 2023-05-01 RX ORDER — POTASSIUM CHLORIDE 20 MEQ/1
40 TABLET, EXTENDED RELEASE ORAL PRN
Status: DISCONTINUED | OUTPATIENT
Start: 2023-05-01 | End: 2023-05-11 | Stop reason: HOSPADM

## 2023-05-01 RX ORDER — POTASSIUM CHLORIDE 7.45 MG/ML
10 INJECTION INTRAVENOUS PRN
Status: DISCONTINUED | OUTPATIENT
Start: 2023-05-01 | End: 2023-05-11 | Stop reason: HOSPADM

## 2023-05-01 RX ADMIN — CHLORDIAZEPOXIDE HYDROCHLORIDE 50 MG: 25 CAPSULE ORAL at 21:26

## 2023-05-01 RX ADMIN — ANTI-FUNGAL POWDER MICONAZOLE NITRATE TALC FREE: 1.42 POWDER TOPICAL at 08:30

## 2023-05-01 RX ADMIN — SODIUM CHLORIDE 3000 MG: 900 INJECTION INTRAVENOUS at 17:42

## 2023-05-01 RX ADMIN — SODIUM CHLORIDE, PRESERVATIVE FREE 10 ML: 5 INJECTION INTRAVENOUS at 21:46

## 2023-05-01 RX ADMIN — DEXTROSE AND SODIUM CHLORIDE: 5; 900 INJECTION, SOLUTION INTRAVENOUS at 17:46

## 2023-05-01 RX ADMIN — OLANZAPINE 15 MG: 15 TABLET, FILM COATED ORAL at 21:29

## 2023-05-01 RX ADMIN — Medication 100 MCG/HR: at 09:30

## 2023-05-01 RX ADMIN — PROPOFOL 20 MCG/KG/MIN: 10 INJECTION, EMULSION INTRAVENOUS at 08:37

## 2023-05-01 RX ADMIN — PROPOFOL 20 MCG/KG/MIN: 10 INJECTION, EMULSION INTRAVENOUS at 19:52

## 2023-05-01 RX ADMIN — PRAVASTATIN SODIUM 40 MG: 40 TABLET ORAL at 21:27

## 2023-05-01 RX ADMIN — SODIUM CHLORIDE 3000 MG: 900 INJECTION INTRAVENOUS at 11:21

## 2023-05-01 RX ADMIN — METOPROLOL TARTRATE 75 MG: 25 TABLET, FILM COATED ORAL at 08:30

## 2023-05-01 RX ADMIN — Medication 75 MCG/HR: at 21:47

## 2023-05-01 RX ADMIN — CHLORHEXIDINE GLUCONATE 0.12% ORAL RINSE 15 ML: 1.2 LIQUID ORAL at 08:30

## 2023-05-01 RX ADMIN — DEXTROSE AND SODIUM CHLORIDE: 5; 900 INJECTION, SOLUTION INTRAVENOUS at 09:40

## 2023-05-01 RX ADMIN — DEXTROSE MONOHYDRATE 250 ML: 100 INJECTION, SOLUTION INTRAVENOUS at 00:51

## 2023-05-01 RX ADMIN — TOPIRAMATE 100 MG: 100 TABLET, FILM COATED ORAL at 21:28

## 2023-05-01 RX ADMIN — DEXTROSE MONOHYDRATE 250 ML: 100 INJECTION, SOLUTION INTRAVENOUS at 00:11

## 2023-05-01 RX ADMIN — BENZTROPINE MESYLATE 1 MG: 1 TABLET ORAL at 08:31

## 2023-05-01 RX ADMIN — BENZTROPINE MESYLATE 1 MG: 1 TABLET ORAL at 21:29

## 2023-05-01 RX ADMIN — ANTI-FUNGAL POWDER MICONAZOLE NITRATE TALC FREE: 1.42 POWDER TOPICAL at 21:46

## 2023-05-01 RX ADMIN — METOPROLOL TARTRATE 75 MG: 25 TABLET, FILM COATED ORAL at 21:27

## 2023-05-01 RX ADMIN — CHLORDIAZEPOXIDE HYDROCHLORIDE 50 MG: 25 CAPSULE ORAL at 02:00

## 2023-05-01 RX ADMIN — FLUCONAZOLE 200 MG: 200 INJECTION, SOLUTION INTRAVENOUS at 09:33

## 2023-05-01 RX ADMIN — SODIUM CHLORIDE 3000 MG: 900 INJECTION INTRAVENOUS at 23:52

## 2023-05-01 RX ADMIN — PROPOFOL 20 MCG/KG/MIN: 10 INJECTION, EMULSION INTRAVENOUS at 14:15

## 2023-05-01 RX ADMIN — PROPOFOL 20 MCG/KG/MIN: 10 INJECTION, EMULSION INTRAVENOUS at 23:59

## 2023-05-01 RX ADMIN — INSULIN LISPRO 2 UNITS: 100 INJECTION, SOLUTION INTRAVENOUS; SUBCUTANEOUS at 21:26

## 2023-05-01 RX ADMIN — INSULIN LISPRO 2 UNITS: 100 INJECTION, SOLUTION INTRAVENOUS; SUBCUTANEOUS at 23:55

## 2023-05-01 RX ADMIN — CHLORDIAZEPOXIDE HYDROCHLORIDE 50 MG: 25 CAPSULE ORAL at 14:09

## 2023-05-01 RX ADMIN — PIPERACILLIN AND TAZOBACTAM 3375 MG: 3; .375 INJECTION, POWDER, LYOPHILIZED, FOR SOLUTION INTRAVENOUS at 05:27

## 2023-05-01 RX ADMIN — FAMOTIDINE 20 MG: 10 INJECTION, SOLUTION INTRAVENOUS at 08:30

## 2023-05-01 RX ADMIN — FONDAPARINUX SODIUM 10 MG: 10 INJECTION, SOLUTION SUBCUTANEOUS at 09:40

## 2023-05-01 RX ADMIN — POTASSIUM BICARBONATE 40 MEQ: 782 TABLET, EFFERVESCENT ORAL at 14:12

## 2023-05-01 RX ADMIN — CHLORDIAZEPOXIDE HYDROCHLORIDE 50 MG: 25 CAPSULE ORAL at 08:30

## 2023-05-01 RX ADMIN — CHLORHEXIDINE GLUCONATE 0.12% ORAL RINSE 15 ML: 1.2 LIQUID ORAL at 21:28

## 2023-05-01 RX ADMIN — FOLIC ACID: 5 INJECTION, SOLUTION INTRAMUSCULAR; INTRAVENOUS; SUBCUTANEOUS at 08:34

## 2023-05-01 ASSESSMENT — PULMONARY FUNCTION TESTS
PIF_VALUE: 17
PIF_VALUE: 9
PIF_VALUE: 12
PIF_VALUE: 14
PIF_VALUE: 20
PIF_VALUE: 16
PIF_VALUE: 16
PIF_VALUE: 13
PIF_VALUE: 18
PIF_VALUE: 14
PIF_VALUE: 17
PIF_VALUE: 10
PIF_VALUE: 17
PIF_VALUE: 17
PIF_VALUE: 18
PIF_VALUE: 13
PIF_VALUE: 15
PIF_VALUE: 13
PIF_VALUE: 15
PIF_VALUE: 14
PIF_VALUE: 12
PIF_VALUE: 18
PIF_VALUE: 20
PIF_VALUE: 16
PIF_VALUE: 18
PIF_VALUE: 15
PIF_VALUE: 14
PIF_VALUE: 19
PIF_VALUE: 15
PIF_VALUE: 16
PIF_VALUE: 19
PIF_VALUE: 10
PIF_VALUE: 8
PIF_VALUE: 21
PIF_VALUE: 15
PIF_VALUE: 17
PIF_VALUE: 16
PIF_VALUE: 16
PIF_VALUE: 9
PIF_VALUE: 15
PIF_VALUE: 14
PIF_VALUE: 17
PIF_VALUE: 10
PIF_VALUE: 15
PIF_VALUE: 17
PIF_VALUE: 16
PIF_VALUE: 17
PIF_VALUE: 15
PIF_VALUE: 16
PIF_VALUE: 19
PIF_VALUE: 18
PIF_VALUE: 8
PIF_VALUE: 18
PIF_VALUE: 14
PIF_VALUE: 13
PIF_VALUE: 16
PIF_VALUE: 14

## 2023-05-01 ASSESSMENT — PAIN SCALES - GENERAL
PAINLEVEL_OUTOF10: 4
PAINLEVEL_OUTOF10: 0
PAINLEVEL_OUTOF10: 4
PAINLEVEL_OUTOF10: 0

## 2023-05-02 ENCOUNTER — APPOINTMENT (OUTPATIENT)
Dept: GENERAL RADIOLOGY | Age: 43
DRG: 896 | End: 2023-05-02
Payer: MEDICARE

## 2023-05-02 LAB
ABSOLUTE EOS #: 0.16 K/UL (ref 0–0.4)
ABSOLUTE LYMPH #: 0.78 K/UL (ref 1–4.8)
ABSOLUTE MONO #: 0.47 K/UL (ref 0.1–1.3)
ALBUMIN SERPL-MCNC: 2.1 G/DL (ref 3.5–5.2)
ALLEN TEST: ABNORMAL
ALP SERPL-CCNC: 229 U/L (ref 35–104)
ALT SERPL-CCNC: 84 U/L (ref 5–33)
ANION GAP SERPL CALCULATED.3IONS-SCNC: 9 MMOL/L (ref 9–17)
AST SERPL-CCNC: 131 U/L
BASOPHILS # BLD: 0 % (ref 0–2)
BASOPHILS ABSOLUTE: 0 K/UL (ref 0–0.2)
BILIRUB SERPL-MCNC: 0.4 MG/DL (ref 0.3–1.2)
BUN SERPL-MCNC: 4 MG/DL (ref 6–20)
CALCIUM SERPL-MCNC: 7.5 MG/DL (ref 8.6–10.4)
CARBOXYHEMOGLOBIN: <1 % (ref 0–5)
CHLORIDE SERPL-SCNC: 106 MMOL/L (ref 98–107)
CO2 SERPL-SCNC: 23 MMOL/L (ref 20–31)
CREAT SERPL-MCNC: 0.44 MG/DL (ref 0.5–0.9)
EOSINOPHILS RELATIVE PERCENT: 1 % (ref 0–4)
FIO2: 30
FOLATE SERPL-MCNC: 14.5 NG/ML
GFR SERPL CREATININE-BSD FRML MDRD: >60 ML/MIN/1.73M2
GLUCOSE BLD-MCNC: 202 MG/DL (ref 65–105)
GLUCOSE BLD-MCNC: 226 MG/DL (ref 65–105)
GLUCOSE BLD-MCNC: 235 MG/DL (ref 65–105)
GLUCOSE SERPL-MCNC: 232 MG/DL (ref 70–99)
HCO3 ARTERIAL: 24.9 MMOL/L (ref 22–26)
HCT VFR BLD AUTO: 35.6 % (ref 36–46)
HGB BLD-MCNC: 10.8 G/DL (ref 12–16)
LYMPHOCYTES # BLD: 5 % (ref 24–44)
MAGNESIUM SERPL-MCNC: 1.8 MG/DL (ref 1.6–2.6)
MCH RBC QN AUTO: 31.1 PG (ref 26–34)
MCHC RBC AUTO-ENTMCNC: 30.4 G/DL (ref 31–37)
MCV RBC AUTO: 102.4 FL (ref 80–100)
METHEMOGLOBIN: 1.8 % (ref 0–1.9)
MICROORGANISM SPEC CULT: ABNORMAL
MODE: ABNORMAL
MONOCYTES # BLD: 3 % (ref 1–7)
MORPHOLOGY: ABNORMAL
MORPHOLOGY: ABNORMAL
O2 DEVICE/FLOW/%: ABNORMAL
O2 SAT, ARTERIAL: 92.8 % (ref 95–98)
PATIENT TEMP: 37
PCO2 ARTERIAL: 38.1 MMHG (ref 35–45)
PDW BLD-RTO: 16.6 % (ref 11.5–14.9)
PEEP/CPAP: 8
PH ARTERIAL: 7.42 (ref 7.35–7.45)
PLATELET # BLD AUTO: 124 K/UL (ref 150–450)
PMV BLD AUTO: 10 FL (ref 6–12)
PO2 ARTERIAL: 75.2 MMHG (ref 80–100)
POSITIVE BASE EXCESS, ART: 0.5 MMOL/L (ref 0–2)
POTASSIUM SERPL-SCNC: 3.3 MMOL/L (ref 3.7–5.3)
PROT SERPL-MCNC: 4 G/DL (ref 6.4–8.3)
PT. POSITION: ABNORMAL
RBC # BLD: 3.48 M/UL (ref 4–5.2)
RESPIRATORY RATE: 20
SAMPLE SITE: ABNORMAL
SEG NEUTROPHILS: 91 % (ref 36–66)
SEGMENTED NEUTROPHILS ABSOLUTE COUNT: 14.09 K/UL (ref 1.3–9.1)
SET RATE: 20
SODIUM SERPL-SCNC: 138 MMOL/L (ref 135–144)
SPECIMEN DESCRIPTION: ABNORMAL
TEXT FOR RESPIRATORY: ABNORMAL
TOTAL RATE: 22
VIT B12 SERPL-MCNC: 1456 PG/ML (ref 232–1245)
VT: 500
WBC # BLD AUTO: 15.5 K/UL (ref 3.5–11)

## 2023-05-02 PROCEDURE — 94003 VENT MGMT INPAT SUBQ DAY: CPT

## 2023-05-02 PROCEDURE — 71045 X-RAY EXAM CHEST 1 VIEW: CPT

## 2023-05-02 PROCEDURE — 82525 ASSAY OF COPPER: CPT

## 2023-05-02 PROCEDURE — 2580000003 HC RX 258

## 2023-05-02 PROCEDURE — 6370000000 HC RX 637 (ALT 250 FOR IP)

## 2023-05-02 PROCEDURE — 6360000002 HC RX W HCPCS: Performed by: INTERNAL MEDICINE

## 2023-05-02 PROCEDURE — 6360000002 HC RX W HCPCS

## 2023-05-02 PROCEDURE — 2000000000 HC ICU R&B

## 2023-05-02 PROCEDURE — 94761 N-INVAS EAR/PLS OXIMETRY MLT: CPT

## 2023-05-02 PROCEDURE — 82746 ASSAY OF FOLIC ACID SERUM: CPT

## 2023-05-02 PROCEDURE — 6370000000 HC RX 637 (ALT 250 FOR IP): Performed by: INTERNAL MEDICINE

## 2023-05-02 PROCEDURE — 80053 COMPREHEN METABOLIC PANEL: CPT

## 2023-05-02 PROCEDURE — A4216 STERILE WATER/SALINE, 10 ML: HCPCS | Performed by: INTERNAL MEDICINE

## 2023-05-02 PROCEDURE — 36415 COLL VENOUS BLD VENIPUNCTURE: CPT

## 2023-05-02 PROCEDURE — 82947 ASSAY GLUCOSE BLOOD QUANT: CPT

## 2023-05-02 PROCEDURE — 2500000003 HC RX 250 WO HCPCS: Performed by: INTERNAL MEDICINE

## 2023-05-02 PROCEDURE — C9113 INJ PANTOPRAZOLE SODIUM, VIA: HCPCS | Performed by: INTERNAL MEDICINE

## 2023-05-02 PROCEDURE — 2500000003 HC RX 250 WO HCPCS

## 2023-05-02 PROCEDURE — 82805 BLOOD GASES W/O2 SATURATION: CPT

## 2023-05-02 PROCEDURE — 2580000003 HC RX 258: Performed by: INTERNAL MEDICINE

## 2023-05-02 PROCEDURE — 82607 VITAMIN B-12: CPT

## 2023-05-02 PROCEDURE — 85025 COMPLETE CBC W/AUTO DIFF WBC: CPT

## 2023-05-02 PROCEDURE — 87641 MR-STAPH DNA AMP PROBE: CPT

## 2023-05-02 PROCEDURE — 2700000000 HC OXYGEN THERAPY PER DAY

## 2023-05-02 PROCEDURE — 84630 ASSAY OF ZINC: CPT

## 2023-05-02 PROCEDURE — 99291 CRITICAL CARE FIRST HOUR: CPT | Performed by: INTERNAL MEDICINE

## 2023-05-02 PROCEDURE — 83735 ASSAY OF MAGNESIUM: CPT

## 2023-05-02 RX ORDER — SODIUM CHLORIDE 9 MG/ML
INJECTION, SOLUTION INTRAVENOUS CONTINUOUS
Status: DISCONTINUED | OUTPATIENT
Start: 2023-05-02 | End: 2023-05-11 | Stop reason: HOSPADM

## 2023-05-02 RX ORDER — LEVOFLOXACIN 5 MG/ML
750 INJECTION, SOLUTION INTRAVENOUS EVERY 24 HOURS
Status: DISCONTINUED | OUTPATIENT
Start: 2023-05-02 | End: 2023-05-04

## 2023-05-02 RX ADMIN — SODIUM CHLORIDE 3000 MG: 900 INJECTION INTRAVENOUS at 05:22

## 2023-05-02 RX ADMIN — VANCOMYCIN HYDROCHLORIDE 1250 MG: 1.25 INJECTION, POWDER, LYOPHILIZED, FOR SOLUTION INTRAVENOUS at 18:33

## 2023-05-02 RX ADMIN — CHLORDIAZEPOXIDE HYDROCHLORIDE 50 MG: 25 CAPSULE ORAL at 20:41

## 2023-05-02 RX ADMIN — ANTI-FUNGAL POWDER MICONAZOLE NITRATE TALC FREE: 1.42 POWDER TOPICAL at 07:59

## 2023-05-02 RX ADMIN — SODIUM CHLORIDE: 9 INJECTION, SOLUTION INTRAVENOUS at 22:57

## 2023-05-02 RX ADMIN — CHLORDIAZEPOXIDE HYDROCHLORIDE 50 MG: 25 CAPSULE ORAL at 02:38

## 2023-05-02 RX ADMIN — BENZTROPINE MESYLATE 1 MG: 1 TABLET ORAL at 20:42

## 2023-05-02 RX ADMIN — TOPIRAMATE 100 MG: 100 TABLET, FILM COATED ORAL at 20:42

## 2023-05-02 RX ADMIN — CHLORDIAZEPOXIDE HYDROCHLORIDE 50 MG: 25 CAPSULE ORAL at 08:02

## 2023-05-02 RX ADMIN — CHLORHEXIDINE GLUCONATE 0.12% ORAL RINSE 15 ML: 1.2 LIQUID ORAL at 20:41

## 2023-05-02 RX ADMIN — FLUCONAZOLE 200 MG: 200 INJECTION, SOLUTION INTRAVENOUS at 07:56

## 2023-05-02 RX ADMIN — PRAVASTATIN SODIUM 40 MG: 40 TABLET ORAL at 20:41

## 2023-05-02 RX ADMIN — SODIUM CHLORIDE, PRESERVATIVE FREE 10 ML: 5 INJECTION INTRAVENOUS at 20:43

## 2023-05-02 RX ADMIN — INSULIN LISPRO 2 UNITS: 100 INJECTION, SOLUTION INTRAVENOUS; SUBCUTANEOUS at 20:41

## 2023-05-02 RX ADMIN — INSULIN LISPRO 2 UNITS: 100 INJECTION, SOLUTION INTRAVENOUS; SUBCUTANEOUS at 09:27

## 2023-05-02 RX ADMIN — BENZTROPINE MESYLATE 1 MG: 1 TABLET ORAL at 08:02

## 2023-05-02 RX ADMIN — OLANZAPINE 15 MG: 15 TABLET, FILM COATED ORAL at 20:43

## 2023-05-02 RX ADMIN — PANTOPRAZOLE SODIUM 40 MG: 40 INJECTION, POWDER, FOR SOLUTION INTRAVENOUS at 07:57

## 2023-05-02 RX ADMIN — SODIUM CHLORIDE: 9 INJECTION, SOLUTION INTRAVENOUS at 10:04

## 2023-05-02 RX ADMIN — POTASSIUM BICARBONATE 40 MEQ: 782 TABLET, EFFERVESCENT ORAL at 06:28

## 2023-05-02 RX ADMIN — METOPROLOL TARTRATE 75 MG: 25 TABLET, FILM COATED ORAL at 20:41

## 2023-05-02 RX ADMIN — FONDAPARINUX SODIUM 10 MG: 10 INJECTION, SOLUTION SUBCUTANEOUS at 08:02

## 2023-05-02 RX ADMIN — PROPOFOL 25 MCG/KG/MIN: 10 INJECTION, EMULSION INTRAVENOUS at 18:31

## 2023-05-02 RX ADMIN — INSULIN LISPRO 4 UNITS: 100 INJECTION, SOLUTION INTRAVENOUS; SUBCUTANEOUS at 13:16

## 2023-05-02 RX ADMIN — PROPOFOL 30 MCG/KG/MIN: 10 INJECTION, EMULSION INTRAVENOUS at 14:27

## 2023-05-02 RX ADMIN — CHLORDIAZEPOXIDE HYDROCHLORIDE 50 MG: 25 CAPSULE ORAL at 13:12

## 2023-05-02 RX ADMIN — LEVOFLOXACIN 750 MG: 5 INJECTION, SOLUTION INTRAVENOUS at 10:33

## 2023-05-02 RX ADMIN — Medication 50 MCG/HR: at 13:20

## 2023-05-02 RX ADMIN — FOLIC ACID: 5 INJECTION, SOLUTION INTRAMUSCULAR; INTRAVENOUS; SUBCUTANEOUS at 08:00

## 2023-05-02 RX ADMIN — DEXTROSE AND SODIUM CHLORIDE: 5; 900 INJECTION, SOLUTION INTRAVENOUS at 05:29

## 2023-05-02 RX ADMIN — ANTI-FUNGAL POWDER MICONAZOLE NITRATE TALC FREE: 1.42 POWDER TOPICAL at 20:44

## 2023-05-02 RX ADMIN — CHLORHEXIDINE GLUCONATE 0.12% ORAL RINSE 15 ML: 1.2 LIQUID ORAL at 08:00

## 2023-05-02 RX ADMIN — PROPOFOL 20 MCG/KG/MIN: 10 INJECTION, EMULSION INTRAVENOUS at 10:02

## 2023-05-02 RX ADMIN — VANCOMYCIN HYDROCHLORIDE 2500 MG: 1.5 INJECTION, POWDER, LYOPHILIZED, FOR SOLUTION INTRAVENOUS at 11:53

## 2023-05-02 RX ADMIN — INSULIN LISPRO 2 UNITS: 100 INJECTION, SOLUTION INTRAVENOUS; SUBCUTANEOUS at 05:22

## 2023-05-02 RX ADMIN — INSULIN LISPRO 2 UNITS: 100 INJECTION, SOLUTION INTRAVENOUS; SUBCUTANEOUS at 17:09

## 2023-05-02 RX ADMIN — METOPROLOL TARTRATE 75 MG: 25 TABLET, FILM COATED ORAL at 08:02

## 2023-05-02 ASSESSMENT — PULMONARY FUNCTION TESTS
PIF_VALUE: 10
PIF_VALUE: 14
PIF_VALUE: 11
PIF_VALUE: 18
PIF_VALUE: 13
PIF_VALUE: 12
PIF_VALUE: 19
PIF_VALUE: 12
PIF_VALUE: 12
PIF_VALUE: 11
PIF_VALUE: 20
PIF_VALUE: 13
PIF_VALUE: 20
PIF_VALUE: 14
PIF_VALUE: 8
PIF_VALUE: 12
PIF_VALUE: 12
PIF_VALUE: 23
PIF_VALUE: 12
PIF_VALUE: 13
PIF_VALUE: 11
PIF_VALUE: 11
PIF_VALUE: 12
PIF_VALUE: 12
PIF_VALUE: 11
PIF_VALUE: 21
PIF_VALUE: 16
PIF_VALUE: 25
PIF_VALUE: 12
PIF_VALUE: 11
PIF_VALUE: 12
PIF_VALUE: 13
PIF_VALUE: 18

## 2023-05-02 ASSESSMENT — PAIN SCALES - GENERAL
PAINLEVEL_OUTOF10: 0

## 2023-05-03 ENCOUNTER — APPOINTMENT (OUTPATIENT)
Dept: GENERAL RADIOLOGY | Age: 43
DRG: 896 | End: 2023-05-03
Payer: MEDICARE

## 2023-05-03 LAB
ABSOLUTE EOS #: 0.1 K/UL (ref 0–0.4)
ABSOLUTE LYMPH #: 0.88 K/UL (ref 1–4.8)
ABSOLUTE MONO #: 1.18 K/UL (ref 0.1–1.3)
ALBUMIN SERPL-MCNC: 1.7 G/DL (ref 3.5–5.2)
ALLEN TEST: ABNORMAL
ALP SERPL-CCNC: 196 U/L (ref 35–104)
ALT SERPL-CCNC: 54 U/L (ref 5–33)
AMPHETAMINE: NEGATIVE NG/ML
ANION GAP SERPL CALCULATED.3IONS-SCNC: 8 MMOL/L (ref 9–17)
AST SERPL-CCNC: 42 U/L
BACTERIA: NORMAL
BARBITURATES: NEGATIVE NG/ML
BASOPHILS # BLD: 0 % (ref 0–2)
BASOPHILS ABSOLUTE: 0 K/UL (ref 0–0.2)
BENZODIAZEPINES: NEGATIVE NG/ML
BILIRUB SERPL-MCNC: 0.3 MG/DL (ref 0.3–1.2)
BILIRUBIN URINE: NEGATIVE
BUN SERPL-MCNC: 4 MG/DL (ref 6–20)
BUPRENORPHINE: NEGATIVE NG/ML
CALCIUM SERPL-MCNC: 7.7 MG/DL (ref 8.6–10.4)
CASTS UA: NORMAL /LPF
CHLORIDE SERPL-SCNC: 111 MMOL/L (ref 98–107)
CO2 SERPL-SCNC: 21 MMOL/L (ref 20–31)
COCAINE: NEGATIVE NG/ML
COLOR: YELLOW
CREAT SERPL-MCNC: <0.4 MG/DL (ref 0.5–0.9)
DRUGS OF ABUSE COMMENT: NORMAL
EOSINOPHILS RELATIVE PERCENT: 1 % (ref 0–4)
EPITHELIAL CELLS UA: NORMAL /HPF
FIO2: 30
GFR SERPL CREATININE-BSD FRML MDRD: ABNORMAL ML/MIN/1.73M2
GLUCOSE BLD-MCNC: 165 MG/DL (ref 65–105)
GLUCOSE BLD-MCNC: 182 MG/DL (ref 65–105)
GLUCOSE BLD-MCNC: 217 MG/DL (ref 65–105)
GLUCOSE BLD-MCNC: 219 MG/DL (ref 65–105)
GLUCOSE BLD-MCNC: 265 MG/DL (ref 65–105)
GLUCOSE BLD-MCNC: 269 MG/DL (ref 65–105)
GLUCOSE SERPL-MCNC: 236 MG/DL (ref 70–99)
GLUCOSE UR STRIP.AUTO-MCNC: ABNORMAL MG/DL
HCO3 ARTERIAL: 21.4 MMOL/L (ref 22–26)
HCT VFR BLD AUTO: 30.9 % (ref 36–46)
HGB BLD-MCNC: 9.8 G/DL (ref 12–16)
IRON SATURATION: 22 % (ref 20–55)
IRON SERPL-MCNC: 15 UG/DL (ref 37–145)
KETONES UR STRIP.AUTO-MCNC: ABNORMAL MG/DL
LEUKOCYTE ESTERASE UR QL STRIP.AUTO: NEGATIVE
LYMPHOCYTES # BLD: 9 % (ref 24–44)
MAGNESIUM SERPL-MCNC: 1.7 MG/DL (ref 1.6–2.6)
MCH RBC QN AUTO: 31 PG (ref 26–34)
MCHC RBC AUTO-ENTMCNC: 31.8 G/DL (ref 31–37)
MCV RBC AUTO: 97.2 FL (ref 80–100)
METHADONE: NEGATIVE NG/ML
METHAMPHETAMINE: NEGATIVE NG/ML
METHEMOGLOBIN: 1.7 % (ref 0–1.9)
MICROORGANISM SPEC CULT: ABNORMAL
MICROORGANISM SPEC CULT: ABNORMAL
MICROORGANISM/AGENT SPEC: ABNORMAL
MODE: ABNORMAL
MONOCYTES # BLD: 12 % (ref 1–7)
MORPHOLOGY: ABNORMAL
MORPHOLOGY: ABNORMAL
MRSA, DNA, NASAL: NEGATIVE
NEGATIVE BASE EXCESS, ART: 3.8 MMOL/L (ref 0–2)
NITRITE UR QL STRIP.AUTO: NEGATIVE
O2 DEVICE/FLOW/%: ABNORMAL
O2 SAT, ARTERIAL: 93.5 % (ref 95–98)
OPIATES: NEGATIVE NG/ML
OXYCODONE: POSITIVE NG/ML
PCO2 ARTERIAL: 36.4 MMHG (ref 35–45)
PDW BLD-RTO: 15.8 % (ref 11.5–14.9)
PEEP/CPAP: 8
PH ARTERIAL: 7.38 (ref 7.35–7.45)
PHENCYCLIDINE: NEGATIVE NG/ML
PLATELET # BLD AUTO: 123 K/UL (ref 150–450)
PMV BLD AUTO: 10.2 FL (ref 6–12)
PO2 ARTERIAL: 79.8 MMHG (ref 80–100)
POTASSIUM SERPL-SCNC: 3 MMOL/L (ref 3.7–5.3)
POTASSIUM SERPL-SCNC: 3.6 MMOL/L (ref 3.7–5.3)
PROT SERPL-MCNC: 4.3 G/DL (ref 6.4–8.3)
PROT UR STRIP.AUTO-MCNC: 8 MG/DL (ref 5–8)
PROT UR STRIP.AUTO-MCNC: ABNORMAL MG/DL
PT. POSITION: ABNORMAL
RBC # BLD: 3.18 M/UL (ref 4–5.2)
RBC CLUMPS #/AREA URNS AUTO: NORMAL /HPF
RESPIRATORY RATE: 12
SAMPLE SITE: ABNORMAL
SEG NEUTROPHILS: 78 % (ref 36–66)
SEGMENTED NEUTROPHILS ABSOLUTE COUNT: 7.64 K/UL (ref 1.3–9.1)
SET RATE: 12
SODIUM SERPL-SCNC: 140 MMOL/L (ref 135–144)
SPECIFIC GRAVITY UA: 1.01 (ref 1–1.03)
SPECIMEN DESCRIPTION: ABNORMAL
SPECIMEN DESCRIPTION: NORMAL
TEXT FOR RESPIRATORY: ABNORMAL
THC: NEGATIVE NG/ML
TIBC SERPL-MCNC: 69 UG/DL (ref 250–450)
TOTAL RATE: 18
TURBIDITY: CLEAR
UNSATURATED IRON BINDING CAPACITY: 54 UG/DL (ref 112–347)
URINE HGB: NEGATIVE
UROBILINOGEN, URINE: NORMAL
VANCOMYCIN RANDOM DATE LAST DOSE: NORMAL
VANCOMYCIN RANDOM DOSE AMOUNT: 1250
VANCOMYCIN RANDOM TIME LAST DOSE: 341
VANCOMYCIN RANDOM: 23.5 UG/ML
VT: 450
WBC # BLD AUTO: 9.8 K/UL (ref 3.5–11)
WBC UA: NORMAL /HPF

## 2023-05-03 PROCEDURE — 6360000002 HC RX W HCPCS

## 2023-05-03 PROCEDURE — 2500000003 HC RX 250 WO HCPCS

## 2023-05-03 PROCEDURE — 2580000003 HC RX 258

## 2023-05-03 PROCEDURE — 2580000003 HC RX 258: Performed by: INTERNAL MEDICINE

## 2023-05-03 PROCEDURE — 2500000003 HC RX 250 WO HCPCS: Performed by: INTERNAL MEDICINE

## 2023-05-03 PROCEDURE — 82805 BLOOD GASES W/O2 SATURATION: CPT

## 2023-05-03 PROCEDURE — 83550 IRON BINDING TEST: CPT

## 2023-05-03 PROCEDURE — 82306 VITAMIN D 25 HYDROXY: CPT

## 2023-05-03 PROCEDURE — 6370000000 HC RX 637 (ALT 250 FOR IP): Performed by: INTERNAL MEDICINE

## 2023-05-03 PROCEDURE — 6360000002 HC RX W HCPCS: Performed by: INTERNAL MEDICINE

## 2023-05-03 PROCEDURE — 2700000000 HC OXYGEN THERAPY PER DAY

## 2023-05-03 PROCEDURE — 82947 ASSAY GLUCOSE BLOOD QUANT: CPT

## 2023-05-03 PROCEDURE — 87205 SMEAR GRAM STAIN: CPT

## 2023-05-03 PROCEDURE — 36415 COLL VENOUS BLD VENIPUNCTURE: CPT

## 2023-05-03 PROCEDURE — 6370000000 HC RX 637 (ALT 250 FOR IP)

## 2023-05-03 PROCEDURE — 83540 ASSAY OF IRON: CPT

## 2023-05-03 PROCEDURE — 80202 ASSAY OF VANCOMYCIN: CPT

## 2023-05-03 PROCEDURE — 51798 US URINE CAPACITY MEASURE: CPT

## 2023-05-03 PROCEDURE — 85025 COMPLETE CBC W/AUTO DIFF WBC: CPT

## 2023-05-03 PROCEDURE — 99291 CRITICAL CARE FIRST HOUR: CPT | Performed by: INTERNAL MEDICINE

## 2023-05-03 PROCEDURE — 71045 X-RAY EXAM CHEST 1 VIEW: CPT

## 2023-05-03 PROCEDURE — C9113 INJ PANTOPRAZOLE SODIUM, VIA: HCPCS | Performed by: INTERNAL MEDICINE

## 2023-05-03 PROCEDURE — A4216 STERILE WATER/SALINE, 10 ML: HCPCS | Performed by: INTERNAL MEDICINE

## 2023-05-03 PROCEDURE — 81001 URINALYSIS AUTO W/SCOPE: CPT

## 2023-05-03 PROCEDURE — 94761 N-INVAS EAR/PLS OXIMETRY MLT: CPT

## 2023-05-03 PROCEDURE — 36600 WITHDRAWAL OF ARTERIAL BLOOD: CPT

## 2023-05-03 PROCEDURE — 80053 COMPREHEN METABOLIC PANEL: CPT

## 2023-05-03 PROCEDURE — 2000000000 HC ICU R&B

## 2023-05-03 PROCEDURE — 94003 VENT MGMT INPAT SUBQ DAY: CPT

## 2023-05-03 PROCEDURE — 84132 ASSAY OF SERUM POTASSIUM: CPT

## 2023-05-03 PROCEDURE — 83735 ASSAY OF MAGNESIUM: CPT

## 2023-05-03 RX ORDER — MAGNESIUM SULFATE HEPTAHYDRATE 40 MG/ML
2000 INJECTION, SOLUTION INTRAVENOUS ONCE
Status: COMPLETED | OUTPATIENT
Start: 2023-05-03 | End: 2023-05-03

## 2023-05-03 RX ORDER — SODIUM CHLORIDE 0.9 % (FLUSH) 0.9 %
5-40 SYRINGE (ML) INJECTION PRN
Status: DISCONTINUED | OUTPATIENT
Start: 2023-05-03 | End: 2023-05-11 | Stop reason: HOSPADM

## 2023-05-03 RX ORDER — INSULIN GLARGINE 100 [IU]/ML
10 INJECTION, SOLUTION SUBCUTANEOUS DAILY
Status: DISCONTINUED | OUTPATIENT
Start: 2023-05-03 | End: 2023-05-03

## 2023-05-03 RX ORDER — LORAZEPAM 2 MG/ML
1 INJECTION INTRAMUSCULAR
Status: DISCONTINUED | OUTPATIENT
Start: 2023-05-03 | End: 2023-05-06

## 2023-05-03 RX ORDER — INSULIN GLARGINE 100 [IU]/ML
15 INJECTION, SOLUTION SUBCUTANEOUS DAILY
Status: DISCONTINUED | OUTPATIENT
Start: 2023-05-04 | End: 2023-05-11 | Stop reason: HOSPADM

## 2023-05-03 RX ORDER — POTASSIUM CHLORIDE 20 MEQ/1
40 TABLET, EXTENDED RELEASE ORAL ONCE
Status: COMPLETED | OUTPATIENT
Start: 2023-05-03 | End: 2023-05-03

## 2023-05-03 RX ORDER — LORAZEPAM 1 MG/1
1 TABLET ORAL
Status: DISCONTINUED | OUTPATIENT
Start: 2023-05-03 | End: 2023-05-06

## 2023-05-03 RX ORDER — LORAZEPAM 1 MG/1
4 TABLET ORAL
Status: DISCONTINUED | OUTPATIENT
Start: 2023-05-03 | End: 2023-05-06

## 2023-05-03 RX ORDER — LORAZEPAM 2 MG/ML
3 INJECTION INTRAMUSCULAR
Status: DISCONTINUED | OUTPATIENT
Start: 2023-05-03 | End: 2023-05-06

## 2023-05-03 RX ORDER — SODIUM CHLORIDE 0.9 % (FLUSH) 0.9 %
5-40 SYRINGE (ML) INJECTION EVERY 12 HOURS SCHEDULED
Status: DISCONTINUED | OUTPATIENT
Start: 2023-05-03 | End: 2023-05-11 | Stop reason: HOSPADM

## 2023-05-03 RX ORDER — LORAZEPAM 1 MG/1
3 TABLET ORAL
Status: DISCONTINUED | OUTPATIENT
Start: 2023-05-03 | End: 2023-05-06

## 2023-05-03 RX ORDER — LORAZEPAM 2 MG/ML
4 INJECTION INTRAMUSCULAR
Status: DISCONTINUED | OUTPATIENT
Start: 2023-05-03 | End: 2023-05-06

## 2023-05-03 RX ORDER — SODIUM CHLORIDE 9 MG/ML
INJECTION, SOLUTION INTRAVENOUS PRN
Status: DISCONTINUED | OUTPATIENT
Start: 2023-05-03 | End: 2023-05-11 | Stop reason: HOSPADM

## 2023-05-03 RX ORDER — LORAZEPAM 1 MG/1
2 TABLET ORAL
Status: DISCONTINUED | OUTPATIENT
Start: 2023-05-03 | End: 2023-05-06

## 2023-05-03 RX ORDER — LORAZEPAM 2 MG/ML
2 INJECTION INTRAMUSCULAR
Status: DISCONTINUED | OUTPATIENT
Start: 2023-05-03 | End: 2023-05-06

## 2023-05-03 RX ORDER — CHLORDIAZEPOXIDE HYDROCHLORIDE 25 MG/1
50 CAPSULE, GELATIN COATED ORAL EVERY 8 HOURS
Status: DISCONTINUED | OUTPATIENT
Start: 2023-05-03 | End: 2023-05-04

## 2023-05-03 RX ADMIN — POTASSIUM CHLORIDE 10 MEQ: 7.46 INJECTION, SOLUTION INTRAVENOUS at 13:09

## 2023-05-03 RX ADMIN — INSULIN LISPRO 4 UNITS: 100 INJECTION, SOLUTION INTRAVENOUS; SUBCUTANEOUS at 04:47

## 2023-05-03 RX ADMIN — VANCOMYCIN HYDROCHLORIDE 1500 MG: 1.5 INJECTION, POWDER, LYOPHILIZED, FOR SOLUTION INTRAVENOUS at 11:04

## 2023-05-03 RX ADMIN — CHLORDIAZEPOXIDE HYDROCHLORIDE 50 MG: 25 CAPSULE ORAL at 21:40

## 2023-05-03 RX ADMIN — POTASSIUM CHLORIDE 10 MEQ: 7.46 INJECTION, SOLUTION INTRAVENOUS at 10:56

## 2023-05-03 RX ADMIN — METOPROLOL TARTRATE 75 MG: 25 TABLET, FILM COATED ORAL at 08:02

## 2023-05-03 RX ADMIN — FOLIC ACID: 5 INJECTION, SOLUTION INTRAMUSCULAR; INTRAVENOUS; SUBCUTANEOUS at 09:33

## 2023-05-03 RX ADMIN — POTASSIUM CHLORIDE 10 MEQ: 7.46 INJECTION, SOLUTION INTRAVENOUS at 16:15

## 2023-05-03 RX ADMIN — ANTI-FUNGAL POWDER MICONAZOLE NITRATE TALC FREE: 1.42 POWDER TOPICAL at 08:16

## 2023-05-03 RX ADMIN — INSULIN LISPRO 2 UNITS: 100 INJECTION, SOLUTION INTRAVENOUS; SUBCUTANEOUS at 12:27

## 2023-05-03 RX ADMIN — FONDAPARINUX SODIUM 10 MG: 10 INJECTION, SOLUTION SUBCUTANEOUS at 08:01

## 2023-05-03 RX ADMIN — PRAVASTATIN SODIUM 40 MG: 40 TABLET ORAL at 20:14

## 2023-05-03 RX ADMIN — CHLORHEXIDINE GLUCONATE 0.12% ORAL RINSE 15 ML: 1.2 LIQUID ORAL at 20:14

## 2023-05-03 RX ADMIN — TOPIRAMATE 100 MG: 100 TABLET, FILM COATED ORAL at 20:14

## 2023-05-03 RX ADMIN — CHLORHEXIDINE GLUCONATE 0.12% ORAL RINSE 15 ML: 1.2 LIQUID ORAL at 08:03

## 2023-05-03 RX ADMIN — POTASSIUM CHLORIDE 10 MEQ: 7.46 INJECTION, SOLUTION INTRAVENOUS at 11:57

## 2023-05-03 RX ADMIN — VANCOMYCIN HYDROCHLORIDE 1250 MG: 1.25 INJECTION, POWDER, LYOPHILIZED, FOR SOLUTION INTRAVENOUS at 03:41

## 2023-05-03 RX ADMIN — SODIUM CHLORIDE, PRESERVATIVE FREE 10 ML: 5 INJECTION INTRAVENOUS at 20:15

## 2023-05-03 RX ADMIN — METOPROLOL TARTRATE 75 MG: 25 TABLET, FILM COATED ORAL at 20:14

## 2023-05-03 RX ADMIN — INSULIN LISPRO 2 UNITS: 100 INJECTION, SOLUTION INTRAVENOUS; SUBCUTANEOUS at 09:39

## 2023-05-03 RX ADMIN — CHLORDIAZEPOXIDE HYDROCHLORIDE 50 MG: 25 CAPSULE ORAL at 08:02

## 2023-05-03 RX ADMIN — INSULIN LISPRO 4 UNITS: 100 INJECTION, SOLUTION INTRAVENOUS; SUBCUTANEOUS at 02:07

## 2023-05-03 RX ADMIN — MAGNESIUM SULFATE HEPTAHYDRATE 2000 MG: 40 INJECTION, SOLUTION INTRAVENOUS at 12:32

## 2023-05-03 RX ADMIN — PANTOPRAZOLE SODIUM 40 MG: 40 INJECTION, POWDER, FOR SOLUTION INTRAVENOUS at 08:03

## 2023-05-03 RX ADMIN — INSULIN GLARGINE 10 UNITS: 100 INJECTION, SOLUTION SUBCUTANEOUS at 09:39

## 2023-05-03 RX ADMIN — POTASSIUM CHLORIDE 10 MEQ: 7.46 INJECTION, SOLUTION INTRAVENOUS at 17:28

## 2023-05-03 RX ADMIN — PROPOFOL 20 MCG/KG/MIN: 10 INJECTION, EMULSION INTRAVENOUS at 01:55

## 2023-05-03 RX ADMIN — POTASSIUM CHLORIDE 40 MEQ: 1500 TABLET, EXTENDED RELEASE ORAL at 21:40

## 2023-05-03 RX ADMIN — BENZTROPINE MESYLATE 1 MG: 1 TABLET ORAL at 08:02

## 2023-05-03 RX ADMIN — SODIUM CHLORIDE, PRESERVATIVE FREE 10 ML: 5 INJECTION INTRAVENOUS at 08:08

## 2023-05-03 RX ADMIN — POTASSIUM CHLORIDE 10 MEQ: 7.46 INJECTION, SOLUTION INTRAVENOUS at 14:57

## 2023-05-03 RX ADMIN — SODIUM CHLORIDE: 9 INJECTION, SOLUTION INTRAVENOUS at 08:21

## 2023-05-03 RX ADMIN — IRON SUCROSE 200 MG: 20 INJECTION, SOLUTION INTRAVENOUS at 17:56

## 2023-05-03 RX ADMIN — BENZTROPINE MESYLATE 1 MG: 1 TABLET ORAL at 20:14

## 2023-05-03 RX ADMIN — LEVOFLOXACIN 750 MG: 5 INJECTION, SOLUTION INTRAVENOUS at 10:53

## 2023-05-03 RX ADMIN — ANTI-FUNGAL POWDER MICONAZOLE NITRATE TALC FREE: 1.42 POWDER TOPICAL at 20:13

## 2023-05-03 RX ADMIN — OLANZAPINE 15 MG: 15 TABLET, FILM COATED ORAL at 20:14

## 2023-05-03 RX ADMIN — SODIUM CHLORIDE: 9 INJECTION, SOLUTION INTRAVENOUS at 17:58

## 2023-05-03 RX ADMIN — CHLORDIAZEPOXIDE HYDROCHLORIDE 50 MG: 25 CAPSULE ORAL at 02:07

## 2023-05-03 RX ADMIN — FLUCONAZOLE 200 MG: 200 INJECTION, SOLUTION INTRAVENOUS at 08:04

## 2023-05-03 RX ADMIN — PROPOFOL 25 MCG/KG/MIN: 10 INJECTION, EMULSION INTRAVENOUS at 06:19

## 2023-05-03 ASSESSMENT — PULMONARY FUNCTION TESTS
PIF_VALUE: 15
PIF_VALUE: 12
PIF_VALUE: 15
PIF_VALUE: 15
PIF_VALUE: 16
PIF_VALUE: 15
PIF_VALUE: 9

## 2023-05-03 ASSESSMENT — PAIN SCALES - GENERAL
PAINLEVEL_OUTOF10: 0
PAINLEVEL_OUTOF10: 0

## 2023-05-04 ENCOUNTER — APPOINTMENT (OUTPATIENT)
Dept: CT IMAGING | Age: 43
DRG: 896 | End: 2023-05-04
Payer: MEDICARE

## 2023-05-04 LAB
25(OH)D3 SERPL-MCNC: 12.5 NG/ML
ABSOLUTE BANDS #: 0.26 K/UL (ref 0–1)
ABSOLUTE EOS #: 0.07 K/UL (ref 0–0.4)
ABSOLUTE LYMPH #: 0.59 K/UL (ref 1–4.8)
ABSOLUTE MONO #: 1.12 K/UL (ref 0.1–1.3)
ALBUMIN SERPL-MCNC: 1.9 G/DL (ref 3.5–5.2)
ALP SERPL-CCNC: 200 U/L (ref 35–104)
ALT SERPL-CCNC: 49 U/L (ref 5–33)
ANION GAP SERPL CALCULATED.3IONS-SCNC: 13 MMOL/L (ref 9–17)
AST SERPL-CCNC: 38 U/L
BANDS: 4 % (ref 0–10)
BASOPHILS # BLD: 0 % (ref 0–2)
BASOPHILS ABSOLUTE: 0 K/UL (ref 0–0.2)
BILIRUB SERPL-MCNC: 0.3 MG/DL (ref 0.3–1.2)
BUN SERPL-MCNC: 4 MG/DL (ref 6–20)
CALCIUM SERPL-MCNC: 8 MG/DL (ref 8.6–10.4)
CHLORIDE SERPL-SCNC: 110 MMOL/L (ref 98–107)
CO2 SERPL-SCNC: 17 MMOL/L (ref 20–31)
COPPER: 71.5 UG/DL (ref 80–155)
CREAT SERPL-MCNC: <0.4 MG/DL (ref 0.5–0.9)
EOSINOPHILS RELATIVE PERCENT: 1 % (ref 0–4)
GFR SERPL CREATININE-BSD FRML MDRD: ABNORMAL ML/MIN/1.73M2
GLUCOSE BLD-MCNC: 176 MG/DL (ref 65–105)
GLUCOSE BLD-MCNC: 194 MG/DL (ref 65–105)
GLUCOSE BLD-MCNC: 220 MG/DL (ref 65–105)
GLUCOSE BLD-MCNC: 226 MG/DL (ref 65–105)
GLUCOSE BLD-MCNC: 240 MG/DL (ref 65–105)
GLUCOSE SERPL-MCNC: 199 MG/DL (ref 70–99)
HCT VFR BLD AUTO: 31.8 % (ref 36–46)
HGB BLD-MCNC: 10.4 G/DL (ref 12–16)
LYMPHOCYTES # BLD: 9 % (ref 24–44)
MAGNESIUM SERPL-MCNC: 1.9 MG/DL (ref 1.6–2.6)
MCH RBC QN AUTO: 31.9 PG (ref 26–34)
MCHC RBC AUTO-ENTMCNC: 32.6 G/DL (ref 31–37)
MCV RBC AUTO: 97.7 FL (ref 80–100)
MICROORGANISM/AGENT SPEC: NORMAL
MONOCYTES # BLD: 17 % (ref 1–7)
MORPHOLOGY: ABNORMAL
PDW BLD-RTO: 16.2 % (ref 11.5–14.9)
PLATELET # BLD AUTO: 146 K/UL (ref 150–450)
PMV BLD AUTO: 9.6 FL (ref 6–12)
POTASSIUM SERPL-SCNC: 3.7 MMOL/L (ref 3.7–5.3)
PROT SERPL-MCNC: 4.8 G/DL (ref 6.4–8.3)
RBC # BLD: 3.25 M/UL (ref 4–5.2)
SEG NEUTROPHILS: 69 % (ref 36–66)
SEGMENTED NEUTROPHILS ABSOLUTE COUNT: 4.56 K/UL (ref 1.3–9.1)
SODIUM SERPL-SCNC: 140 MMOL/L (ref 135–144)
SPECIMEN DESCRIPTION: NORMAL
WBC # BLD AUTO: 6.6 K/UL (ref 3.5–11)
ZINC: 31.6 UG/DL (ref 60–120)

## 2023-05-04 PROCEDURE — 6360000002 HC RX W HCPCS

## 2023-05-04 PROCEDURE — 97162 PT EVAL MOD COMPLEX 30 MIN: CPT

## 2023-05-04 PROCEDURE — 82947 ASSAY GLUCOSE BLOOD QUANT: CPT

## 2023-05-04 PROCEDURE — 6360000002 HC RX W HCPCS: Performed by: INTERNAL MEDICINE

## 2023-05-04 PROCEDURE — 97530 THERAPEUTIC ACTIVITIES: CPT

## 2023-05-04 PROCEDURE — 6370000000 HC RX 637 (ALT 250 FOR IP): Performed by: INTERNAL MEDICINE

## 2023-05-04 PROCEDURE — 2500000003 HC RX 250 WO HCPCS

## 2023-05-04 PROCEDURE — A4216 STERILE WATER/SALINE, 10 ML: HCPCS | Performed by: INTERNAL MEDICINE

## 2023-05-04 PROCEDURE — 6370000000 HC RX 637 (ALT 250 FOR IP)

## 2023-05-04 PROCEDURE — 2580000003 HC RX 258: Performed by: INTERNAL MEDICINE

## 2023-05-04 PROCEDURE — 2580000003 HC RX 258

## 2023-05-04 PROCEDURE — 99233 SBSQ HOSP IP/OBS HIGH 50: CPT | Performed by: INTERNAL MEDICINE

## 2023-05-04 PROCEDURE — 70450 CT HEAD/BRAIN W/O DYE: CPT

## 2023-05-04 PROCEDURE — 94761 N-INVAS EAR/PLS OXIMETRY MLT: CPT

## 2023-05-04 PROCEDURE — 2060000000 HC ICU INTERMEDIATE R&B

## 2023-05-04 PROCEDURE — 36415 COLL VENOUS BLD VENIPUNCTURE: CPT

## 2023-05-04 PROCEDURE — 83735 ASSAY OF MAGNESIUM: CPT

## 2023-05-04 PROCEDURE — 85025 COMPLETE CBC W/AUTO DIFF WBC: CPT

## 2023-05-04 PROCEDURE — C9113 INJ PANTOPRAZOLE SODIUM, VIA: HCPCS | Performed by: INTERNAL MEDICINE

## 2023-05-04 PROCEDURE — 97167 OT EVAL HIGH COMPLEX 60 MIN: CPT

## 2023-05-04 PROCEDURE — 80053 COMPREHEN METABOLIC PANEL: CPT

## 2023-05-04 RX ORDER — ERGOCALCIFEROL 1.25 MG/1
50000 CAPSULE ORAL WEEKLY
Status: DISCONTINUED | OUTPATIENT
Start: 2023-05-04 | End: 2023-05-11 | Stop reason: HOSPADM

## 2023-05-04 RX ORDER — METOPROLOL TARTRATE 50 MG/1
50 TABLET, FILM COATED ORAL 2 TIMES DAILY
Status: DISCONTINUED | OUTPATIENT
Start: 2023-05-04 | End: 2023-05-11 | Stop reason: HOSPADM

## 2023-05-04 RX ORDER — GAUZE BANDAGE 2" X 2"
100 BANDAGE TOPICAL DAILY
Status: DISCONTINUED | OUTPATIENT
Start: 2023-05-05 | End: 2023-05-11 | Stop reason: HOSPADM

## 2023-05-04 RX ORDER — CHLORDIAZEPOXIDE HYDROCHLORIDE 25 MG/1
25 CAPSULE, GELATIN COATED ORAL EVERY 8 HOURS
Status: DISCONTINUED | OUTPATIENT
Start: 2023-05-04 | End: 2023-05-04

## 2023-05-04 RX ORDER — CHLORDIAZEPOXIDE HYDROCHLORIDE 25 MG/1
25 CAPSULE, GELATIN COATED ORAL 2 TIMES DAILY
Status: DISCONTINUED | OUTPATIENT
Start: 2023-05-04 | End: 2023-05-11 | Stop reason: HOSPADM

## 2023-05-04 RX ORDER — LEVOFLOXACIN 750 MG/1
750 TABLET ORAL DAILY
Status: COMPLETED | OUTPATIENT
Start: 2023-05-05 | End: 2023-05-08

## 2023-05-04 RX ORDER — ERGOCALCIFEROL 1.25 MG/1
50000 CAPSULE ORAL WEEKLY
Status: DISCONTINUED | OUTPATIENT
Start: 2023-05-04 | End: 2023-05-04

## 2023-05-04 RX ORDER — FOLIC ACID 1 MG/1
1 TABLET ORAL DAILY
Status: DISCONTINUED | OUTPATIENT
Start: 2023-05-05 | End: 2023-05-11 | Stop reason: HOSPADM

## 2023-05-04 RX ADMIN — PRAVASTATIN SODIUM 40 MG: 40 TABLET ORAL at 21:48

## 2023-05-04 RX ADMIN — ERGOCALCIFEROL 50000 UNITS: 1.25 CAPSULE ORAL at 09:21

## 2023-05-04 RX ADMIN — LEVOFLOXACIN 750 MG: 5 INJECTION, SOLUTION INTRAVENOUS at 10:56

## 2023-05-04 RX ADMIN — SODIUM CHLORIDE: 9 INJECTION, SOLUTION INTRAVENOUS at 04:07

## 2023-05-04 RX ADMIN — FOLIC ACID: 5 INJECTION, SOLUTION INTRAMUSCULAR; INTRAVENOUS; SUBCUTANEOUS at 09:20

## 2023-05-04 RX ADMIN — SODIUM CHLORIDE, PRESERVATIVE FREE 10 ML: 5 INJECTION INTRAVENOUS at 21:54

## 2023-05-04 RX ADMIN — METOPROLOL TARTRATE 50 MG: 50 TABLET ORAL at 21:48

## 2023-05-04 RX ADMIN — SODIUM CHLORIDE, PRESERVATIVE FREE 10 ML: 5 INJECTION INTRAVENOUS at 08:21

## 2023-05-04 RX ADMIN — POTASSIUM CHLORIDE 40 MEQ: 1500 TABLET, EXTENDED RELEASE ORAL at 06:28

## 2023-05-04 RX ADMIN — INSULIN GLARGINE 15 UNITS: 100 INJECTION, SOLUTION SUBCUTANEOUS at 08:18

## 2023-05-04 RX ADMIN — SODIUM CHLORIDE, PRESERVATIVE FREE 20 ML: 5 INJECTION INTRAVENOUS at 21:48

## 2023-05-04 RX ADMIN — INSULIN LISPRO 2 UNITS: 100 INJECTION, SOLUTION INTRAVENOUS; SUBCUTANEOUS at 12:21

## 2023-05-04 RX ADMIN — SODIUM CHLORIDE: 9 INJECTION, SOLUTION INTRAVENOUS at 14:16

## 2023-05-04 RX ADMIN — BENZTROPINE MESYLATE 1 MG: 1 TABLET ORAL at 08:19

## 2023-05-04 RX ADMIN — CHLORDIAZEPOXIDE HYDROCHLORIDE 50 MG: 25 CAPSULE ORAL at 06:28

## 2023-05-04 RX ADMIN — FONDAPARINUX SODIUM 10 MG: 10 INJECTION, SOLUTION SUBCUTANEOUS at 08:18

## 2023-05-04 RX ADMIN — INSULIN LISPRO 2 UNITS: 100 INJECTION, SOLUTION INTRAVENOUS; SUBCUTANEOUS at 21:43

## 2023-05-04 RX ADMIN — IRON SUCROSE 200 MG: 20 INJECTION, SOLUTION INTRAVENOUS at 18:05

## 2023-05-04 RX ADMIN — ANTI-FUNGAL POWDER MICONAZOLE NITRATE TALC FREE: 1.42 POWDER TOPICAL at 08:14

## 2023-05-04 RX ADMIN — FLUCONAZOLE 200 MG: 200 INJECTION, SOLUTION INTRAVENOUS at 08:14

## 2023-05-04 RX ADMIN — PANTOPRAZOLE SODIUM 40 MG: 40 INJECTION, POWDER, FOR SOLUTION INTRAVENOUS at 08:11

## 2023-05-04 RX ADMIN — INSULIN LISPRO 2 UNITS: 100 INJECTION, SOLUTION INTRAVENOUS; SUBCUTANEOUS at 18:18

## 2023-05-04 RX ADMIN — METOPROLOL TARTRATE 75 MG: 25 TABLET, FILM COATED ORAL at 08:14

## 2023-05-04 RX ADMIN — SODIUM CHLORIDE, PRESERVATIVE FREE 10 ML: 5 INJECTION INTRAVENOUS at 08:20

## 2023-05-04 RX ADMIN — DILTIAZEM HYDROCHLORIDE 120 MG: 120 CAPSULE, COATED, EXTENDED RELEASE ORAL at 08:14

## 2023-05-04 RX ADMIN — ANTI-FUNGAL POWDER MICONAZOLE NITRATE TALC FREE: 1.42 POWDER TOPICAL at 23:09

## 2023-05-04 ASSESSMENT — ENCOUNTER SYMPTOMS
APNEA: 0
ABDOMINAL DISTENTION: 0
DIARRHEA: 1

## 2023-05-05 LAB
ABSOLUTE EOS #: 0.09 K/UL (ref 0–0.4)
ABSOLUTE LYMPH #: 0.55 K/UL (ref 1–4.8)
ABSOLUTE MONO #: 1.01 K/UL (ref 0.1–1.3)
ALBUMIN SERPL-MCNC: 1.6 G/DL (ref 3.5–5.2)
ALLEN TEST: ABNORMAL
ALP SERPL-CCNC: 185 U/L (ref 35–104)
ALT SERPL-CCNC: 42 U/L (ref 5–33)
ANION GAP SERPL CALCULATED.3IONS-SCNC: 8 MMOL/L (ref 9–17)
AST SERPL-CCNC: 38 U/L
BASOPHILS # BLD: 0 % (ref 0–2)
BASOPHILS ABSOLUTE: 0 K/UL (ref 0–0.2)
BILIRUB SERPL-MCNC: 0.3 MG/DL (ref 0.3–1.2)
BUN SERPL-MCNC: 4 MG/DL (ref 6–20)
CALCIUM SERPL-MCNC: 8.4 MG/DL (ref 8.6–10.4)
CHLORIDE SERPL-SCNC: 110 MMOL/L (ref 98–107)
CO2 SERPL-SCNC: 19 MMOL/L (ref 20–31)
CREAT SERPL-MCNC: <0.4 MG/DL (ref 0.5–0.9)
EOSINOPHILS RELATIVE PERCENT: 2 % (ref 0–4)
GFR SERPL CREATININE-BSD FRML MDRD: ABNORMAL ML/MIN/1.73M2
GLUCOSE BLD-MCNC: 138 MG/DL (ref 65–105)
GLUCOSE BLD-MCNC: 141 MG/DL (ref 65–105)
GLUCOSE BLD-MCNC: 148 MG/DL (ref 65–105)
GLUCOSE BLD-MCNC: 180 MG/DL (ref 65–105)
GLUCOSE BLD-MCNC: 197 MG/DL (ref 65–105)
GLUCOSE BLD-MCNC: 275 MG/DL (ref 65–105)
GLUCOSE BLD-MCNC: 277 MG/DL (ref 65–105)
GLUCOSE SERPL-MCNC: 164 MG/DL (ref 70–99)
HCO3 ARTERIAL: 21.7 MMOL/L (ref 22–26)
HCT VFR BLD AUTO: 31.6 % (ref 36–46)
HGB BLD-MCNC: 10.4 G/DL (ref 12–16)
LYMPHOCYTES # BLD: 12 % (ref 24–44)
MAGNESIUM SERPL-MCNC: 1.7 MG/DL (ref 1.6–2.6)
MCH RBC QN AUTO: 31.7 PG (ref 26–34)
MCHC RBC AUTO-ENTMCNC: 33 G/DL (ref 31–37)
MCV RBC AUTO: 96.3 FL (ref 80–100)
METHEMOGLOBIN: 1.3 % (ref 0–1.9)
MONOCYTES # BLD: 22 % (ref 1–7)
MORPHOLOGY: ABNORMAL
NEGATIVE BASE EXCESS, ART: 3.2 MMOL/L (ref 0–2)
O2 DEVICE/FLOW/%: ABNORMAL
O2 SAT, ARTERIAL: 86 % (ref 95–98)
PATIENT TEMP: 37
PCO2 ARTERIAL: 35.6 MMHG (ref 35–45)
PDW BLD-RTO: 15.9 % (ref 11.5–14.9)
PH ARTERIAL: 7.39 (ref 7.35–7.45)
PLATELET # BLD AUTO: 190 K/UL (ref 150–450)
PMV BLD AUTO: 9.9 FL (ref 6–12)
PO2 ARTERIAL: 55.7 MMHG (ref 80–100)
POTASSIUM SERPL-SCNC: 3.5 MMOL/L (ref 3.7–5.3)
PROT SERPL-MCNC: 5 G/DL (ref 6.4–8.3)
PT. POSITION: ABNORMAL
RBC # BLD: 3.28 M/UL (ref 4–5.2)
RESPIRATORY RATE: 30
SAMPLE SITE: ABNORMAL
SEG NEUTROPHILS: 64 % (ref 36–66)
SEGMENTED NEUTROPHILS ABSOLUTE COUNT: 2.95 K/UL (ref 1.3–9.1)
SODIUM SERPL-SCNC: 137 MMOL/L (ref 135–144)
TEXT FOR RESPIRATORY: ABNORMAL
WBC # BLD AUTO: 4.6 K/UL (ref 3.5–11)

## 2023-05-05 PROCEDURE — 97530 THERAPEUTIC ACTIVITIES: CPT

## 2023-05-05 PROCEDURE — 2500000003 HC RX 250 WO HCPCS: Performed by: INTERNAL MEDICINE

## 2023-05-05 PROCEDURE — 82805 BLOOD GASES W/O2 SATURATION: CPT

## 2023-05-05 PROCEDURE — 36600 WITHDRAWAL OF ARTERIAL BLOOD: CPT

## 2023-05-05 PROCEDURE — 83735 ASSAY OF MAGNESIUM: CPT

## 2023-05-05 PROCEDURE — 6370000000 HC RX 637 (ALT 250 FOR IP): Performed by: INTERNAL MEDICINE

## 2023-05-05 PROCEDURE — 6370000000 HC RX 637 (ALT 250 FOR IP)

## 2023-05-05 PROCEDURE — 92610 EVALUATE SWALLOWING FUNCTION: CPT

## 2023-05-05 PROCEDURE — 2060000000 HC ICU INTERMEDIATE R&B

## 2023-05-05 PROCEDURE — 82947 ASSAY GLUCOSE BLOOD QUANT: CPT

## 2023-05-05 PROCEDURE — 2580000003 HC RX 258

## 2023-05-05 PROCEDURE — 80053 COMPREHEN METABOLIC PANEL: CPT

## 2023-05-05 PROCEDURE — 6360000002 HC RX W HCPCS

## 2023-05-05 PROCEDURE — 85025 COMPLETE CBC W/AUTO DIFF WBC: CPT

## 2023-05-05 PROCEDURE — 36415 COLL VENOUS BLD VENIPUNCTURE: CPT

## 2023-05-05 PROCEDURE — 97535 SELF CARE MNGMENT TRAINING: CPT

## 2023-05-05 PROCEDURE — 99233 SBSQ HOSP IP/OBS HIGH 50: CPT | Performed by: INTERNAL MEDICINE

## 2023-05-05 PROCEDURE — 97110 THERAPEUTIC EXERCISES: CPT

## 2023-05-05 RX ADMIN — SODIUM CHLORIDE, PRESERVATIVE FREE 10 ML: 5 INJECTION INTRAVENOUS at 11:02

## 2023-05-05 RX ADMIN — FONDAPARINUX SODIUM 10 MG: 10 INJECTION, SOLUTION SUBCUTANEOUS at 10:10

## 2023-05-05 RX ADMIN — SODIUM CHLORIDE, PRESERVATIVE FREE 10 ML: 5 INJECTION INTRAVENOUS at 22:44

## 2023-05-05 RX ADMIN — MAGNESIUM SULFATE HEPTAHYDRATE 2000 MG: 40 INJECTION, SOLUTION INTRAVENOUS at 10:59

## 2023-05-05 RX ADMIN — THIAMINE HCL TAB 100 MG 100 MG: 100 TAB at 09:38

## 2023-05-05 RX ADMIN — OLANZAPINE 15 MG: 15 TABLET, FILM COATED ORAL at 22:38

## 2023-05-05 RX ADMIN — Medication: at 15:31

## 2023-05-05 RX ADMIN — IRON SUCROSE 200 MG: 20 INJECTION, SOLUTION INTRAVENOUS at 17:01

## 2023-05-05 RX ADMIN — METOPROLOL TARTRATE 50 MG: 50 TABLET ORAL at 22:38

## 2023-05-05 RX ADMIN — BENZTROPINE MESYLATE 1 MG: 1 TABLET ORAL at 10:10

## 2023-05-05 RX ADMIN — TOPIRAMATE 100 MG: 100 TABLET, FILM COATED ORAL at 22:38

## 2023-05-05 RX ADMIN — SODIUM CHLORIDE: 9 INJECTION, SOLUTION INTRAVENOUS at 14:55

## 2023-05-05 RX ADMIN — SODIUM CHLORIDE, PRESERVATIVE FREE 10 ML: 5 INJECTION INTRAVENOUS at 16:58

## 2023-05-05 RX ADMIN — SODIUM CHLORIDE, PRESERVATIVE FREE 10 ML: 5 INJECTION INTRAVENOUS at 11:03

## 2023-05-05 RX ADMIN — FOLIC ACID 1 MG: 1 TABLET ORAL at 09:38

## 2023-05-05 RX ADMIN — LEVOFLOXACIN 750 MG: 750 TABLET, FILM COATED ORAL at 09:37

## 2023-05-05 RX ADMIN — CHLORDIAZEPOXIDE HYDROCHLORIDE 25 MG: 25 CAPSULE ORAL at 09:38

## 2023-05-05 RX ADMIN — POTASSIUM BICARBONATE 40 MEQ: 782 TABLET, EFFERVESCENT ORAL at 09:38

## 2023-05-05 RX ADMIN — BENZTROPINE MESYLATE 1 MG: 1 TABLET ORAL at 22:40

## 2023-05-05 RX ADMIN — INSULIN GLARGINE 15 UNITS: 100 INJECTION, SOLUTION SUBCUTANEOUS at 09:30

## 2023-05-05 RX ADMIN — PRAVASTATIN SODIUM 40 MG: 40 TABLET ORAL at 22:38

## 2023-05-05 RX ADMIN — ANTI-FUNGAL POWDER MICONAZOLE NITRATE TALC FREE: 1.42 POWDER TOPICAL at 09:38

## 2023-05-05 RX ADMIN — INSULIN LISPRO 4 UNITS: 100 INJECTION, SOLUTION INTRAVENOUS; SUBCUTANEOUS at 13:22

## 2023-05-05 RX ADMIN — ANTI-FUNGAL POWDER MICONAZOLE NITRATE TALC FREE: 1.42 POWDER TOPICAL at 22:45

## 2023-05-05 ASSESSMENT — ENCOUNTER SYMPTOMS
ABDOMINAL DISTENTION: 0
DIARRHEA: 0
APNEA: 0

## 2023-05-06 ENCOUNTER — APPOINTMENT (OUTPATIENT)
Dept: GENERAL RADIOLOGY | Age: 43
DRG: 896 | End: 2023-05-06
Payer: MEDICARE

## 2023-05-06 PROBLEM — F10.930 ALCOHOL WITHDRAWAL SYNDROME WITHOUT COMPLICATION (HCC): Status: ACTIVE | Noted: 2023-05-06

## 2023-05-06 PROBLEM — G93.40 ENCEPHALOPATHY: Status: ACTIVE | Noted: 2023-05-06

## 2023-05-06 LAB
ABSOLUTE EOS #: 0.09 K/UL (ref 0–0.4)
ABSOLUTE LYMPH #: 1.22 K/UL (ref 1–4.8)
ABSOLUTE MONO #: 1.22 K/UL (ref 0.1–1.3)
ALBUMIN SERPL-MCNC: 1.9 G/DL (ref 3.5–5.2)
ALP SERPL-CCNC: 192 U/L (ref 35–104)
ALT SERPL-CCNC: 39 U/L (ref 5–33)
ANION GAP SERPL CALCULATED.3IONS-SCNC: 11 MMOL/L (ref 9–17)
AST SERPL-CCNC: 29 U/L
BASOPHILS # BLD: 1 % (ref 0–2)
BASOPHILS ABSOLUTE: 0.05 K/UL (ref 0–0.2)
BILIRUB SERPL-MCNC: 0.3 MG/DL (ref 0.3–1.2)
BUN SERPL-MCNC: 3 MG/DL (ref 6–20)
CALCIUM SERPL-MCNC: 8.2 MG/DL (ref 8.6–10.4)
CHLORIDE SERPL-SCNC: 109 MMOL/L (ref 98–107)
CO2 SERPL-SCNC: 21 MMOL/L (ref 20–31)
CODEINE: <2 NG/ML
CREAT SERPL-MCNC: <0.4 MG/DL (ref 0.5–0.9)
EOSINOPHILS RELATIVE PERCENT: 2 % (ref 0–4)
GFR SERPL CREATININE-BSD FRML MDRD: ABNORMAL ML/MIN/1.73M2
GLUCOSE BLD-MCNC: 156 MG/DL (ref 65–105)
GLUCOSE BLD-MCNC: 183 MG/DL (ref 65–105)
GLUCOSE BLD-MCNC: 189 MG/DL (ref 65–105)
GLUCOSE BLD-MCNC: 205 MG/DL (ref 65–105)
GLUCOSE BLD-MCNC: 207 MG/DL (ref 65–105)
GLUCOSE BLD-MCNC: 215 MG/DL (ref 65–105)
GLUCOSE SERPL-MCNC: 201 MG/DL (ref 70–99)
HCT VFR BLD AUTO: 34.3 % (ref 36–46)
HGB BLD-MCNC: 11.1 G/DL (ref 12–16)
HYDROCODONE: <2 NG/ML
HYDROMORPHONE: <2 NG/ML
LYMPHOCYTES # BLD: 26 % (ref 24–44)
MAGNESIUM SERPL-MCNC: 1.9 MG/DL (ref 1.6–2.6)
MCH RBC QN AUTO: 31.4 PG (ref 26–34)
MCHC RBC AUTO-ENTMCNC: 32.4 G/DL (ref 31–37)
MCV RBC AUTO: 96.9 FL (ref 80–100)
MONOCYTES # BLD: 26 % (ref 1–7)
MORPHINE: <2 NG/ML
MORPHOLOGY: ABNORMAL
MORPHOLOGY: ABNORMAL
OPIATES, BLOOD: <2 NG/ML
OXYCODONE: 8 NG/ML
OXYMORPHONE: <2 NG/ML
PDW BLD-RTO: 16 % (ref 11.5–14.9)
PLATELET # BLD AUTO: 188 K/UL (ref 150–450)
PMV BLD AUTO: 11.1 FL (ref 6–12)
POTASSIUM SERPL-SCNC: 3.5 MMOL/L (ref 3.7–5.3)
PROT SERPL-MCNC: 4.9 G/DL (ref 6.4–8.3)
RBC # BLD: 3.54 M/UL (ref 4–5.2)
SEG NEUTROPHILS: 45 % (ref 36–66)
SEGMENTED NEUTROPHILS ABSOLUTE COUNT: 2.12 K/UL (ref 1.3–9.1)
SODIUM SERPL-SCNC: 141 MMOL/L (ref 135–144)
WBC # BLD AUTO: 4.7 K/UL (ref 3.5–11)

## 2023-05-06 PROCEDURE — 99223 1ST HOSP IP/OBS HIGH 75: CPT | Performed by: PSYCHIATRY & NEUROLOGY

## 2023-05-06 PROCEDURE — 82947 ASSAY GLUCOSE BLOOD QUANT: CPT

## 2023-05-06 PROCEDURE — 2580000003 HC RX 258

## 2023-05-06 PROCEDURE — 6370000000 HC RX 637 (ALT 250 FOR IP): Performed by: INTERNAL MEDICINE

## 2023-05-06 PROCEDURE — 85025 COMPLETE CBC W/AUTO DIFF WBC: CPT

## 2023-05-06 PROCEDURE — 99233 SBSQ HOSP IP/OBS HIGH 50: CPT | Performed by: INTERNAL MEDICINE

## 2023-05-06 PROCEDURE — 6360000002 HC RX W HCPCS

## 2023-05-06 PROCEDURE — 36415 COLL VENOUS BLD VENIPUNCTURE: CPT

## 2023-05-06 PROCEDURE — 80053 COMPREHEN METABOLIC PANEL: CPT

## 2023-05-06 PROCEDURE — 2060000000 HC ICU INTERMEDIATE R&B

## 2023-05-06 PROCEDURE — 6370000000 HC RX 637 (ALT 250 FOR IP)

## 2023-05-06 PROCEDURE — 2580000003 HC RX 258: Performed by: EMERGENCY MEDICINE

## 2023-05-06 PROCEDURE — 83735 ASSAY OF MAGNESIUM: CPT

## 2023-05-06 RX ORDER — VITS A,C,E/LUTEIN/MINERALS 300MCG-200
1 TABLET ORAL DAILY
Status: DISCONTINUED | OUTPATIENT
Start: 2023-05-06 | End: 2023-05-11 | Stop reason: HOSPADM

## 2023-05-06 RX ORDER — CYANOCOBALAMIN 1000 UG/ML
1000 INJECTION, SOLUTION INTRAMUSCULAR; SUBCUTANEOUS ONCE
Status: COMPLETED | OUTPATIENT
Start: 2023-05-06 | End: 2023-05-06

## 2023-05-06 RX ADMIN — METOPROLOL TARTRATE 50 MG: 50 TABLET ORAL at 10:47

## 2023-05-06 RX ADMIN — LEVOFLOXACIN 750 MG: 750 TABLET, FILM COATED ORAL at 10:47

## 2023-05-06 RX ADMIN — INSULIN LISPRO 2 UNITS: 100 INJECTION, SOLUTION INTRAVENOUS; SUBCUTANEOUS at 10:49

## 2023-05-06 RX ADMIN — SODIUM CHLORIDE, PRESERVATIVE FREE 10 ML: 5 INJECTION INTRAVENOUS at 10:43

## 2023-05-06 RX ADMIN — INSULIN LISPRO 2 UNITS: 100 INJECTION, SOLUTION INTRAVENOUS; SUBCUTANEOUS at 22:38

## 2023-05-06 RX ADMIN — THIAMINE HCL TAB 100 MG 100 MG: 100 TAB at 10:53

## 2023-05-06 RX ADMIN — I-VITE, TAB 1000-60-2MG (60/BT) 1 TABLET: TAB at 10:50

## 2023-05-06 RX ADMIN — INSULIN LISPRO 2 UNITS: 100 INJECTION, SOLUTION INTRAVENOUS; SUBCUTANEOUS at 18:26

## 2023-05-06 RX ADMIN — ANTI-FUNGAL POWDER MICONAZOLE NITRATE TALC FREE: 1.42 POWDER TOPICAL at 10:52

## 2023-05-06 RX ADMIN — FONDAPARINUX SODIUM 10 MG: 10 INJECTION, SOLUTION SUBCUTANEOUS at 10:50

## 2023-05-06 RX ADMIN — CYANOCOBALAMIN 1000 MCG: 1000 INJECTION, SOLUTION INTRAMUSCULAR at 10:50

## 2023-05-06 RX ADMIN — BENZTROPINE MESYLATE 1 MG: 1 TABLET ORAL at 22:36

## 2023-05-06 RX ADMIN — SODIUM CHLORIDE: 9 INJECTION, SOLUTION INTRAVENOUS at 16:02

## 2023-05-06 RX ADMIN — OLANZAPINE 15 MG: 15 TABLET, FILM COATED ORAL at 22:35

## 2023-05-06 RX ADMIN — TOPIRAMATE 100 MG: 100 TABLET, FILM COATED ORAL at 22:35

## 2023-05-06 RX ADMIN — ANTI-FUNGAL POWDER MICONAZOLE NITRATE TALC FREE: 1.42 POWDER TOPICAL at 21:25

## 2023-05-06 RX ADMIN — PRAVASTATIN SODIUM 40 MG: 40 TABLET ORAL at 22:36

## 2023-05-06 RX ADMIN — BENZTROPINE MESYLATE 1 MG: 1 TABLET ORAL at 10:50

## 2023-05-06 RX ADMIN — FOLIC ACID 1 MG: 1 TABLET ORAL at 10:49

## 2023-05-06 ASSESSMENT — ENCOUNTER SYMPTOMS
APNEA: 0
ABDOMINAL DISTENTION: 0
DIARRHEA: 0

## 2023-05-07 ENCOUNTER — APPOINTMENT (OUTPATIENT)
Dept: GENERAL RADIOLOGY | Age: 43
DRG: 896 | End: 2023-05-07
Payer: MEDICARE

## 2023-05-07 LAB
ABSOLUTE EOS #: 0.05 K/UL (ref 0–0.4)
ABSOLUTE LYMPH #: 2.39 K/UL (ref 1–4.8)
ABSOLUTE MONO #: 0.78 K/UL (ref 0.1–1.3)
ALBUMIN SERPL-MCNC: 1.8 G/DL (ref 3.5–5.2)
ALP SERPL-CCNC: 167 U/L (ref 35–104)
ALT SERPL-CCNC: 31 U/L (ref 5–33)
ANION GAP SERPL CALCULATED.3IONS-SCNC: 8 MMOL/L (ref 9–17)
AST SERPL-CCNC: 24 U/L
BASOPHILS # BLD: 0 % (ref 0–2)
BASOPHILS ABSOLUTE: 0 K/UL (ref 0–0.2)
BILIRUB SERPL-MCNC: 0.2 MG/DL (ref 0.3–1.2)
BUN SERPL-MCNC: 3 MG/DL (ref 6–20)
CALCIUM SERPL-MCNC: 8.1 MG/DL (ref 8.6–10.4)
CHLORIDE SERPL-SCNC: 114 MMOL/L (ref 98–107)
CO2 SERPL-SCNC: 23 MMOL/L (ref 20–31)
CREAT SERPL-MCNC: 0.41 MG/DL (ref 0.5–0.9)
EOSINOPHILS RELATIVE PERCENT: 1 % (ref 0–4)
GFR SERPL CREATININE-BSD FRML MDRD: >60 ML/MIN/1.73M2
GLUCOSE BLD-MCNC: 150 MG/DL (ref 65–105)
GLUCOSE BLD-MCNC: 166 MG/DL (ref 65–105)
GLUCOSE BLD-MCNC: 191 MG/DL (ref 65–105)
GLUCOSE BLD-MCNC: 247 MG/DL (ref 65–105)
GLUCOSE SERPL-MCNC: 179 MG/DL (ref 70–99)
HCT VFR BLD AUTO: 34 % (ref 36–46)
HGB BLD-MCNC: 10.8 G/DL (ref 12–16)
LYMPHOCYTES # BLD: 52 % (ref 24–44)
MAGNESIUM SERPL-MCNC: 2 MG/DL (ref 1.6–2.6)
MCH RBC QN AUTO: 31.2 PG (ref 26–34)
MCHC RBC AUTO-ENTMCNC: 31.9 G/DL (ref 31–37)
MCV RBC AUTO: 97.9 FL (ref 80–100)
MONOCYTES # BLD: 17 % (ref 1–7)
MORPHOLOGY: ABNORMAL
PDW BLD-RTO: 16.6 % (ref 11.5–14.9)
PLATELET # BLD AUTO: 229 K/UL (ref 150–450)
PMV BLD AUTO: 10.5 FL (ref 6–12)
POTASSIUM SERPL-SCNC: 3.7 MMOL/L (ref 3.7–5.3)
PROT SERPL-MCNC: 4.6 G/DL (ref 6.4–8.3)
RBC # BLD: 3.47 M/UL (ref 4–5.2)
SEG NEUTROPHILS: 30 % (ref 36–66)
SEGMENTED NEUTROPHILS ABSOLUTE COUNT: 1.38 K/UL (ref 1.3–9.1)
SODIUM SERPL-SCNC: 145 MMOL/L (ref 135–144)
WBC # BLD AUTO: 4.6 K/UL (ref 3.5–11)

## 2023-05-07 PROCEDURE — 6370000000 HC RX 637 (ALT 250 FOR IP)

## 2023-05-07 PROCEDURE — 85025 COMPLETE CBC W/AUTO DIFF WBC: CPT

## 2023-05-07 PROCEDURE — 6360000002 HC RX W HCPCS

## 2023-05-07 PROCEDURE — 92611 MOTION FLUOROSCOPY/SWALLOW: CPT

## 2023-05-07 PROCEDURE — 2060000000 HC ICU INTERMEDIATE R&B

## 2023-05-07 PROCEDURE — 6370000000 HC RX 637 (ALT 250 FOR IP): Performed by: INTERNAL MEDICINE

## 2023-05-07 PROCEDURE — 2580000003 HC RX 258

## 2023-05-07 PROCEDURE — 99233 SBSQ HOSP IP/OBS HIGH 50: CPT | Performed by: INTERNAL MEDICINE

## 2023-05-07 PROCEDURE — 80053 COMPREHEN METABOLIC PANEL: CPT

## 2023-05-07 PROCEDURE — 36415 COLL VENOUS BLD VENIPUNCTURE: CPT

## 2023-05-07 PROCEDURE — 74230 X-RAY XM SWLNG FUNCJ C+: CPT

## 2023-05-07 PROCEDURE — 83735 ASSAY OF MAGNESIUM: CPT

## 2023-05-07 PROCEDURE — 99232 SBSQ HOSP IP/OBS MODERATE 35: CPT | Performed by: PSYCHIATRY & NEUROLOGY

## 2023-05-07 PROCEDURE — 82947 ASSAY GLUCOSE BLOOD QUANT: CPT

## 2023-05-07 RX ADMIN — SODIUM CHLORIDE, PRESERVATIVE FREE 10 ML: 5 INJECTION INTRAVENOUS at 20:30

## 2023-05-07 RX ADMIN — FONDAPARINUX SODIUM 10 MG: 10 INJECTION, SOLUTION SUBCUTANEOUS at 10:33

## 2023-05-07 RX ADMIN — METOPROLOL TARTRATE 50 MG: 50 TABLET ORAL at 10:31

## 2023-05-07 RX ADMIN — INSULIN LISPRO 2 UNITS: 100 INJECTION, SOLUTION INTRAVENOUS; SUBCUTANEOUS at 21:52

## 2023-05-07 RX ADMIN — ACETAMINOPHEN 650 MG: 325 TABLET ORAL at 20:08

## 2023-05-07 RX ADMIN — BENZTROPINE MESYLATE 1 MG: 1 TABLET ORAL at 10:32

## 2023-05-07 RX ADMIN — INSULIN GLARGINE 15 UNITS: 100 INJECTION, SOLUTION SUBCUTANEOUS at 10:30

## 2023-05-07 RX ADMIN — SODIUM CHLORIDE, PRESERVATIVE FREE 10 ML: 5 INJECTION INTRAVENOUS at 11:11

## 2023-05-07 RX ADMIN — INSULIN LISPRO 2 UNITS: 100 INJECTION, SOLUTION INTRAVENOUS; SUBCUTANEOUS at 14:33

## 2023-05-07 RX ADMIN — ACETAMINOPHEN 650 MG: 325 TABLET ORAL at 10:31

## 2023-05-07 RX ADMIN — BENZTROPINE MESYLATE 1 MG: 1 TABLET ORAL at 20:30

## 2023-05-07 RX ADMIN — OLANZAPINE 15 MG: 15 TABLET, FILM COATED ORAL at 20:11

## 2023-05-07 RX ADMIN — SODIUM CHLORIDE: 9 INJECTION, SOLUTION INTRAVENOUS at 06:10

## 2023-05-07 RX ADMIN — ANTI-FUNGAL POWDER MICONAZOLE NITRATE TALC FREE: 1.42 POWDER TOPICAL at 11:10

## 2023-05-07 RX ADMIN — ANTI-FUNGAL POWDER MICONAZOLE NITRATE TALC FREE: 1.42 POWDER TOPICAL at 20:29

## 2023-05-07 RX ADMIN — METOPROLOL TARTRATE 50 MG: 50 TABLET ORAL at 20:11

## 2023-05-07 RX ADMIN — THIAMINE HCL TAB 100 MG 100 MG: 100 TAB at 10:31

## 2023-05-07 RX ADMIN — SODIUM CHLORIDE: 9 INJECTION, SOLUTION INTRAVENOUS at 21:53

## 2023-05-07 RX ADMIN — I-VITE, TAB 1000-60-2MG (60/BT) 1 TABLET: TAB at 11:10

## 2023-05-07 RX ADMIN — LEVOFLOXACIN 750 MG: 750 TABLET, FILM COATED ORAL at 10:31

## 2023-05-07 RX ADMIN — PRAVASTATIN SODIUM 40 MG: 40 TABLET ORAL at 20:11

## 2023-05-07 RX ADMIN — FOLIC ACID 1 MG: 1 TABLET ORAL at 10:31

## 2023-05-07 RX ADMIN — TOPIRAMATE 100 MG: 100 TABLET, FILM COATED ORAL at 20:11

## 2023-05-07 ASSESSMENT — PAIN DESCRIPTION - DESCRIPTORS
DESCRIPTORS: ACHING
DESCRIPTORS: ACHING;DISCOMFORT

## 2023-05-07 ASSESSMENT — PAIN DESCRIPTION - LOCATION
LOCATION: BACK
LOCATION: LEG

## 2023-05-07 ASSESSMENT — PAIN SCALES - GENERAL: PAINLEVEL_OUTOF10: 7

## 2023-05-07 ASSESSMENT — PAIN DESCRIPTION - ORIENTATION
ORIENTATION: MID
ORIENTATION: RIGHT;LEFT

## 2023-05-08 ENCOUNTER — APPOINTMENT (OUTPATIENT)
Dept: MRI IMAGING | Age: 43
DRG: 896 | End: 2023-05-08
Payer: MEDICARE

## 2023-05-08 LAB
ABSOLUTE EOS #: 0 K/UL (ref 0–0.4)
ABSOLUTE LYMPH #: 1.35 K/UL (ref 1–4.8)
ABSOLUTE MONO #: 0.53 K/UL (ref 0.1–1.3)
ALBUMIN SERPL-MCNC: 2.2 G/DL (ref 3.5–5.2)
ALP SERPL-CCNC: 166 U/L (ref 35–104)
ALT SERPL-CCNC: 32 U/L (ref 5–33)
ANION GAP SERPL CALCULATED.3IONS-SCNC: 11 MMOL/L (ref 9–17)
AST SERPL-CCNC: 33 U/L
BASOPHILS # BLD: 0 % (ref 0–2)
BASOPHILS ABSOLUTE: 0 K/UL (ref 0–0.2)
BILIRUB SERPL-MCNC: 0.2 MG/DL (ref 0.3–1.2)
BUN SERPL-MCNC: 3 MG/DL (ref 6–20)
CALCIUM SERPL-MCNC: 8.2 MG/DL (ref 8.6–10.4)
CHLORIDE SERPL-SCNC: 110 MMOL/L (ref 98–107)
CO2 SERPL-SCNC: 22 MMOL/L (ref 20–31)
CREAT SERPL-MCNC: <0.4 MG/DL (ref 0.5–0.9)
EOSINOPHILS RELATIVE PERCENT: 0 % (ref 0–4)
GFR SERPL CREATININE-BSD FRML MDRD: ABNORMAL ML/MIN/1.73M2
GLUCOSE BLD-MCNC: 161 MG/DL (ref 65–105)
GLUCOSE BLD-MCNC: 162 MG/DL (ref 65–105)
GLUCOSE BLD-MCNC: 179 MG/DL (ref 65–105)
GLUCOSE BLD-MCNC: 190 MG/DL (ref 65–105)
GLUCOSE BLD-MCNC: 215 MG/DL (ref 65–105)
GLUCOSE BLD-MCNC: 223 MG/DL (ref 65–105)
GLUCOSE SERPL-MCNC: 205 MG/DL (ref 70–99)
HCT VFR BLD AUTO: 31.6 % (ref 36–46)
HGB BLD-MCNC: 10.7 G/DL (ref 12–16)
LYMPHOCYTES # BLD: 33 % (ref 24–44)
MAGNESIUM SERPL-MCNC: 1.7 MG/DL (ref 1.6–2.6)
MCH RBC QN AUTO: 32.6 PG (ref 26–34)
MCHC RBC AUTO-ENTMCNC: 33.8 G/DL (ref 31–37)
MCV RBC AUTO: 96.4 FL (ref 80–100)
MONOCYTES # BLD: 13 % (ref 1–7)
MORPHOLOGY: ABNORMAL
MORPHOLOGY: ABNORMAL
PDW BLD-RTO: 16.2 % (ref 11.5–14.9)
PLATELET # BLD AUTO: 220 K/UL (ref 150–450)
PMV BLD AUTO: 10.5 FL (ref 6–12)
POTASSIUM SERPL-SCNC: 3.6 MMOL/L (ref 3.7–5.3)
PROT SERPL-MCNC: 5 G/DL (ref 6.4–8.3)
RBC # BLD: 3.27 M/UL (ref 4–5.2)
SEG NEUTROPHILS: 54 % (ref 36–66)
SEGMENTED NEUTROPHILS ABSOLUTE COUNT: 2.22 K/UL (ref 1.3–9.1)
SODIUM SERPL-SCNC: 143 MMOL/L (ref 135–144)
WBC # BLD AUTO: 4.1 K/UL (ref 3.5–11)

## 2023-05-08 PROCEDURE — 6370000000 HC RX 637 (ALT 250 FOR IP): Performed by: INTERNAL MEDICINE

## 2023-05-08 PROCEDURE — 97116 GAIT TRAINING THERAPY: CPT

## 2023-05-08 PROCEDURE — 6370000000 HC RX 637 (ALT 250 FOR IP)

## 2023-05-08 PROCEDURE — 85025 COMPLETE CBC W/AUTO DIFF WBC: CPT

## 2023-05-08 PROCEDURE — 6360000002 HC RX W HCPCS

## 2023-05-08 PROCEDURE — 82947 ASSAY GLUCOSE BLOOD QUANT: CPT

## 2023-05-08 PROCEDURE — 2060000000 HC ICU INTERMEDIATE R&B

## 2023-05-08 PROCEDURE — 97530 THERAPEUTIC ACTIVITIES: CPT

## 2023-05-08 PROCEDURE — 70553 MRI BRAIN STEM W/O & W/DYE: CPT

## 2023-05-08 PROCEDURE — 92526 ORAL FUNCTION THERAPY: CPT

## 2023-05-08 PROCEDURE — 2580000003 HC RX 258: Performed by: PSYCHIATRY & NEUROLOGY

## 2023-05-08 PROCEDURE — 99233 SBSQ HOSP IP/OBS HIGH 50: CPT | Performed by: INTERNAL MEDICINE

## 2023-05-08 PROCEDURE — 80053 COMPREHEN METABOLIC PANEL: CPT

## 2023-05-08 PROCEDURE — 83735 ASSAY OF MAGNESIUM: CPT

## 2023-05-08 PROCEDURE — 2580000003 HC RX 258

## 2023-05-08 PROCEDURE — 36415 COLL VENOUS BLD VENIPUNCTURE: CPT

## 2023-05-08 PROCEDURE — 97110 THERAPEUTIC EXERCISES: CPT

## 2023-05-08 PROCEDURE — 99232 SBSQ HOSP IP/OBS MODERATE 35: CPT | Performed by: PSYCHIATRY & NEUROLOGY

## 2023-05-08 PROCEDURE — 99231 SBSQ HOSP IP/OBS SF/LOW 25: CPT | Performed by: PSYCHIATRY & NEUROLOGY

## 2023-05-08 PROCEDURE — A9579 GAD-BASE MR CONTRAST NOS,1ML: HCPCS | Performed by: PSYCHIATRY & NEUROLOGY

## 2023-05-08 PROCEDURE — 6360000004 HC RX CONTRAST MEDICATION: Performed by: PSYCHIATRY & NEUROLOGY

## 2023-05-08 RX ORDER — SODIUM CHLORIDE 0.9 % (FLUSH) 0.9 %
10 SYRINGE (ML) INJECTION PRN
Status: DISCONTINUED | OUTPATIENT
Start: 2023-05-08 | End: 2023-05-11 | Stop reason: HOSPADM

## 2023-05-08 RX ADMIN — SODIUM CHLORIDE, PRESERVATIVE FREE 10 ML: 5 INJECTION INTRAVENOUS at 08:38

## 2023-05-08 RX ADMIN — PRAVASTATIN SODIUM 40 MG: 40 TABLET ORAL at 21:18

## 2023-05-08 RX ADMIN — FONDAPARINUX SODIUM 10 MG: 10 INJECTION, SOLUTION SUBCUTANEOUS at 08:39

## 2023-05-08 RX ADMIN — METOPROLOL TARTRATE 50 MG: 50 TABLET ORAL at 08:36

## 2023-05-08 RX ADMIN — INSULIN LISPRO 2 UNITS: 100 INJECTION, SOLUTION INTRAVENOUS; SUBCUTANEOUS at 12:42

## 2023-05-08 RX ADMIN — FOLIC ACID 1 MG: 1 TABLET ORAL at 08:36

## 2023-05-08 RX ADMIN — OLANZAPINE 15 MG: 15 TABLET, FILM COATED ORAL at 21:17

## 2023-05-08 RX ADMIN — SODIUM CHLORIDE: 9 INJECTION, SOLUTION INTRAVENOUS at 18:42

## 2023-05-08 RX ADMIN — ANTI-FUNGAL POWDER MICONAZOLE NITRATE TALC FREE: 1.42 POWDER TOPICAL at 12:42

## 2023-05-08 RX ADMIN — ACETAMINOPHEN 650 MG: 325 TABLET ORAL at 21:18

## 2023-05-08 RX ADMIN — TOPIRAMATE 100 MG: 100 TABLET, FILM COATED ORAL at 21:18

## 2023-05-08 RX ADMIN — LEVOFLOXACIN 750 MG: 750 TABLET, FILM COATED ORAL at 08:36

## 2023-05-08 RX ADMIN — METOPROLOL TARTRATE 50 MG: 50 TABLET ORAL at 21:17

## 2023-05-08 RX ADMIN — SODIUM CHLORIDE: 9 INJECTION, SOLUTION INTRAVENOUS at 06:06

## 2023-05-08 RX ADMIN — SODIUM CHLORIDE, PRESERVATIVE FREE 10 ML: 5 INJECTION INTRAVENOUS at 23:43

## 2023-05-08 RX ADMIN — BENZTROPINE MESYLATE 1 MG: 1 TABLET ORAL at 08:39

## 2023-05-08 RX ADMIN — GADOTERIDOL 20 ML: 279.3 INJECTION, SOLUTION INTRAVENOUS at 15:37

## 2023-05-08 RX ADMIN — ACETAMINOPHEN 650 MG: 325 TABLET ORAL at 03:41

## 2023-05-08 RX ADMIN — BENZTROPINE MESYLATE 1 MG: 1 TABLET ORAL at 23:43

## 2023-05-08 RX ADMIN — ANTI-FUNGAL POWDER MICONAZOLE NITRATE TALC FREE: 1.42 POWDER TOPICAL at 21:28

## 2023-05-08 RX ADMIN — SODIUM CHLORIDE, PRESERVATIVE FREE 10 ML: 5 INJECTION INTRAVENOUS at 15:38

## 2023-05-08 RX ADMIN — THIAMINE HCL TAB 100 MG 100 MG: 100 TAB at 08:39

## 2023-05-08 RX ADMIN — I-VITE, TAB 1000-60-2MG (60/BT) 1 TABLET: TAB at 12:41

## 2023-05-08 RX ADMIN — ACETAMINOPHEN 650 MG: 325 TABLET ORAL at 10:30

## 2023-05-08 RX ADMIN — SODIUM CHLORIDE, PRESERVATIVE FREE 10 ML: 5 INJECTION INTRAVENOUS at 23:45

## 2023-05-08 RX ADMIN — INSULIN GLARGINE 15 UNITS: 100 INJECTION, SOLUTION SUBCUTANEOUS at 08:36

## 2023-05-08 ASSESSMENT — PAIN DESCRIPTION - DESCRIPTORS: DESCRIPTORS: ACHING

## 2023-05-08 ASSESSMENT — PAIN DESCRIPTION - LOCATION
LOCATION: HIP
LOCATION: HIP

## 2023-05-08 ASSESSMENT — PAIN SCALES - GENERAL
PAINLEVEL_OUTOF10: 0
PAINLEVEL_OUTOF10: 8
PAINLEVEL_OUTOF10: 8

## 2023-05-08 ASSESSMENT — PAIN DESCRIPTION - ORIENTATION: ORIENTATION: RIGHT;LEFT

## 2023-05-08 ASSESSMENT — PAIN - FUNCTIONAL ASSESSMENT: PAIN_FUNCTIONAL_ASSESSMENT: ACTIVITIES ARE NOT PREVENTED

## 2023-05-08 NOTE — PLAN OF CARE
Please have pt or POA who knows medical hx fill out online MRI screening form. The pt will need to be dressed in hospital clothes for this exam. If there are any questions please call [phone]. Thanks!

## 2023-05-08 NOTE — CARE COORDINATION
ONGOING DISCHARGE PLAN:        Spoke with patient/mom regarding discharge plan and patient/mom  confirms that plan is still to discharge to [facility] when medically cleared     Patient will have a MRI done today     Oral ATB    Will continue to follow for additional discharge needs. If patient is discharged prior to next notation, then this note serves as note for discharge by case management.     Electronically signed by Gerard Welch RN on 5/8/2023 at 10:47 AM

## 2023-05-09 PROBLEM — T40.2X1A OPIOID OVERDOSE (HCC): Status: ACTIVE | Noted: 2023-05-09

## 2023-05-09 LAB
ABSOLUTE EOS #: 0.04 K/UL (ref 0–0.4)
ABSOLUTE LYMPH #: 1.39 K/UL (ref 1–4.8)
ABSOLUTE MONO #: 0.45 K/UL (ref 0.1–1.3)
ALBUMIN SERPL-MCNC: 2 G/DL (ref 3.5–5.2)
ALP SERPL-CCNC: 149 U/L (ref 35–104)
ALT SERPL-CCNC: 27 U/L (ref 5–33)
ANION GAP SERPL CALCULATED.3IONS-SCNC: 9 MMOL/L (ref 9–17)
AST SERPL-CCNC: 17 U/L
BASOPHILS # BLD: 0 % (ref 0–2)
BASOPHILS ABSOLUTE: 0 K/UL (ref 0–0.2)
BILIRUB SERPL-MCNC: 0.2 MG/DL (ref 0.3–1.2)
BUN SERPL-MCNC: 3 MG/DL (ref 6–20)
CALCIUM SERPL-MCNC: 8.1 MG/DL (ref 8.6–10.4)
CHLORIDE SERPL-SCNC: 114 MMOL/L (ref 98–107)
CO2 SERPL-SCNC: 21 MMOL/L (ref 20–31)
CREAT SERPL-MCNC: <0.4 MG/DL (ref 0.5–0.9)
EOSINOPHILS RELATIVE PERCENT: 1 % (ref 0–4)
GFR SERPL CREATININE-BSD FRML MDRD: ABNORMAL ML/MIN/1.73M2
GLUCOSE BLD-MCNC: 179 MG/DL (ref 65–105)
GLUCOSE BLD-MCNC: 188 MG/DL (ref 65–105)
GLUCOSE BLD-MCNC: 195 MG/DL (ref 65–105)
GLUCOSE BLD-MCNC: 219 MG/DL (ref 65–105)
GLUCOSE BLD-MCNC: 235 MG/DL (ref 65–105)
GLUCOSE SERPL-MCNC: 234 MG/DL (ref 70–99)
HCT VFR BLD AUTO: 31.4 % (ref 36–46)
HGB BLD-MCNC: 10.2 G/DL (ref 12–16)
LYMPHOCYTES # BLD: 34 % (ref 24–44)
MAGNESIUM SERPL-MCNC: 1.6 MG/DL (ref 1.6–2.6)
MCH RBC QN AUTO: 31.6 PG (ref 26–34)
MCHC RBC AUTO-ENTMCNC: 32.5 G/DL (ref 31–37)
MCV RBC AUTO: 97.3 FL (ref 80–100)
MONOCYTES # BLD: 11 % (ref 1–7)
MORPHOLOGY: ABNORMAL
MORPHOLOGY: ABNORMAL
PDW BLD-RTO: 16 % (ref 11.5–14.9)
PLATELET # BLD AUTO: 178 K/UL (ref 150–450)
PMV BLD AUTO: 9.6 FL (ref 6–12)
POTASSIUM SERPL-SCNC: 3.5 MMOL/L (ref 3.7–5.3)
PROT SERPL-MCNC: 4.7 G/DL (ref 6.4–8.3)
RBC # BLD: 3.23 M/UL (ref 4–5.2)
SEG NEUTROPHILS: 54 % (ref 36–66)
SEGMENTED NEUTROPHILS ABSOLUTE COUNT: 2.22 K/UL (ref 1.3–9.1)
SODIUM SERPL-SCNC: 144 MMOL/L (ref 135–144)
WBC # BLD AUTO: 4.1 K/UL (ref 3.5–11)

## 2023-05-09 PROCEDURE — 6370000000 HC RX 637 (ALT 250 FOR IP): Performed by: INTERNAL MEDICINE

## 2023-05-09 PROCEDURE — 85025 COMPLETE CBC W/AUTO DIFF WBC: CPT

## 2023-05-09 PROCEDURE — 97530 THERAPEUTIC ACTIVITIES: CPT

## 2023-05-09 PROCEDURE — 97535 SELF CARE MNGMENT TRAINING: CPT

## 2023-05-09 PROCEDURE — 2580000003 HC RX 258

## 2023-05-09 PROCEDURE — 2060000000 HC ICU INTERMEDIATE R&B

## 2023-05-09 PROCEDURE — 82947 ASSAY GLUCOSE BLOOD QUANT: CPT

## 2023-05-09 PROCEDURE — 6370000000 HC RX 637 (ALT 250 FOR IP)

## 2023-05-09 PROCEDURE — 97110 THERAPEUTIC EXERCISES: CPT

## 2023-05-09 PROCEDURE — 99232 SBSQ HOSP IP/OBS MODERATE 35: CPT | Performed by: PSYCHIATRY & NEUROLOGY

## 2023-05-09 PROCEDURE — 92526 ORAL FUNCTION THERAPY: CPT

## 2023-05-09 PROCEDURE — 99233 SBSQ HOSP IP/OBS HIGH 50: CPT | Performed by: INTERNAL MEDICINE

## 2023-05-09 PROCEDURE — 36415 COLL VENOUS BLD VENIPUNCTURE: CPT

## 2023-05-09 PROCEDURE — 83735 ASSAY OF MAGNESIUM: CPT

## 2023-05-09 PROCEDURE — 80053 COMPREHEN METABOLIC PANEL: CPT

## 2023-05-09 PROCEDURE — 6360000002 HC RX W HCPCS

## 2023-05-09 RX ADMIN — ACETAMINOPHEN 650 MG: 325 TABLET ORAL at 03:46

## 2023-05-09 RX ADMIN — TOPIRAMATE 100 MG: 100 TABLET, FILM COATED ORAL at 21:09

## 2023-05-09 RX ADMIN — INSULIN GLARGINE 15 UNITS: 100 INJECTION, SOLUTION SUBCUTANEOUS at 09:27

## 2023-05-09 RX ADMIN — METOPROLOL TARTRATE 50 MG: 50 TABLET ORAL at 21:09

## 2023-05-09 RX ADMIN — OLANZAPINE 15 MG: 15 TABLET, FILM COATED ORAL at 21:08

## 2023-05-09 RX ADMIN — BENZTROPINE MESYLATE 1 MG: 1 TABLET ORAL at 09:31

## 2023-05-09 RX ADMIN — SODIUM CHLORIDE, PRESERVATIVE FREE 10 ML: 5 INJECTION INTRAVENOUS at 21:10

## 2023-05-09 RX ADMIN — INSULIN LISPRO 2 UNITS: 100 INJECTION, SOLUTION INTRAVENOUS; SUBCUTANEOUS at 12:31

## 2023-05-09 RX ADMIN — FOLIC ACID 1 MG: 1 TABLET ORAL at 09:28

## 2023-05-09 RX ADMIN — BENZTROPINE MESYLATE 1 MG: 1 TABLET ORAL at 21:12

## 2023-05-09 RX ADMIN — POTASSIUM CHLORIDE 40 MEQ: 1500 TABLET, EXTENDED RELEASE ORAL at 09:28

## 2023-05-09 RX ADMIN — PRAVASTATIN SODIUM 40 MG: 40 TABLET ORAL at 21:09

## 2023-05-09 RX ADMIN — THIAMINE HCL TAB 100 MG 100 MG: 100 TAB at 09:28

## 2023-05-09 RX ADMIN — FONDAPARINUX SODIUM 10 MG: 10 INJECTION, SOLUTION SUBCUTANEOUS at 09:30

## 2023-05-09 RX ADMIN — I-VITE, TAB 1000-60-2MG (60/BT) 1 TABLET: TAB at 09:31

## 2023-05-09 RX ADMIN — METOPROLOL TARTRATE 50 MG: 50 TABLET ORAL at 09:28

## 2023-05-09 RX ADMIN — ACETAMINOPHEN 650 MG: 325 TABLET ORAL at 18:51

## 2023-05-09 RX ADMIN — ANTI-FUNGAL POWDER MICONAZOLE NITRATE TALC FREE: 1.42 POWDER TOPICAL at 09:28

## 2023-05-09 ASSESSMENT — PAIN SCALES - GENERAL
PAINLEVEL_OUTOF10: 0
PAINLEVEL_OUTOF10: 0
PAINLEVEL_OUTOF10: 8
PAINLEVEL_OUTOF10: 0

## 2023-05-09 NOTE — CARE COORDINATION
ONGOING DISCHARGE PLAN:         Spoke with patient/mom regarding discharge plan and patient/mom  confirms that plan is still to discharge to [facility] when medically cleared       Oral ATB     Will continue to follow for additional discharge needs. If patient is discharged prior to next notation, then this note serves as note for discharge by case management.     Electronically signed by Ambrosio Christensen RN on 5/9/2023 at 1:18 PM

## 2023-05-09 NOTE — PLAN OF CARE

## 2023-05-10 LAB
ABSOLUTE EOS #: 0.08 K/UL (ref 0–0.4)
ABSOLUTE LYMPH #: 1.39 K/UL (ref 1–4.8)
ABSOLUTE MONO #: 0.29 K/UL (ref 0.1–1.3)
ALBUMIN SERPL-MCNC: 2.1 G/DL (ref 3.5–5.2)
ALP SERPL-CCNC: 154 U/L (ref 35–104)
ALT SERPL-CCNC: 30 U/L (ref 5–33)
ANION GAP SERPL CALCULATED.3IONS-SCNC: 8 MMOL/L (ref 9–17)
AST SERPL-CCNC: 24 U/L
BASOPHILS # BLD: 0 % (ref 0–2)
BASOPHILS ABSOLUTE: 0 K/UL (ref 0–0.2)
BILIRUB SERPL-MCNC: 0.2 MG/DL (ref 0.3–1.2)
BUN SERPL-MCNC: 3 MG/DL (ref 6–20)
CALCIUM SERPL-MCNC: 8.2 MG/DL (ref 8.6–10.4)
CHLORIDE SERPL-SCNC: 113 MMOL/L (ref 98–107)
CO2 SERPL-SCNC: 21 MMOL/L (ref 20–31)
CREAT SERPL-MCNC: <0.4 MG/DL (ref 0.5–0.9)
EOSINOPHILS RELATIVE PERCENT: 2 % (ref 0–4)
GFR SERPL CREATININE-BSD FRML MDRD: ABNORMAL ML/MIN/1.73M2
GLUCOSE BLD-MCNC: 116 MG/DL (ref 65–105)
GLUCOSE BLD-MCNC: 132 MG/DL (ref 65–105)
GLUCOSE BLD-MCNC: 133 MG/DL (ref 65–105)
GLUCOSE BLD-MCNC: 185 MG/DL (ref 65–105)
GLUCOSE BLD-MCNC: 279 MG/DL (ref 65–105)
GLUCOSE SERPL-MCNC: 148 MG/DL (ref 70–99)
HCT VFR BLD AUTO: 32.8 % (ref 36–46)
HGB BLD-MCNC: 10.9 G/DL (ref 12–16)
LYMPHOCYTES # BLD: 34 % (ref 24–44)
MAGNESIUM SERPL-MCNC: 1.7 MG/DL (ref 1.6–2.6)
MCH RBC QN AUTO: 32.2 PG (ref 26–34)
MCHC RBC AUTO-ENTMCNC: 33.2 G/DL (ref 31–37)
MCV RBC AUTO: 96.8 FL (ref 80–100)
MONOCYTES # BLD: 7 % (ref 1–7)
MORPHOLOGY: ABNORMAL
PDW BLD-RTO: 16.4 % (ref 11.5–14.9)
PLATELET # BLD AUTO: 265 K/UL (ref 150–450)
PMV BLD AUTO: 10.2 FL (ref 6–12)
POTASSIUM SERPL-SCNC: 3.4 MMOL/L (ref 3.7–5.3)
PROT SERPL-MCNC: 4.9 G/DL (ref 6.4–8.3)
RBC # BLD: 3.39 M/UL (ref 4–5.2)
SEG NEUTROPHILS: 57 % (ref 36–66)
SEGMENTED NEUTROPHILS ABSOLUTE COUNT: 2.34 K/UL (ref 1.3–9.1)
SODIUM SERPL-SCNC: 142 MMOL/L (ref 135–144)
WBC # BLD AUTO: 4.1 K/UL (ref 3.5–11)

## 2023-05-10 PROCEDURE — 92526 ORAL FUNCTION THERAPY: CPT

## 2023-05-10 PROCEDURE — 36415 COLL VENOUS BLD VENIPUNCTURE: CPT

## 2023-05-10 PROCEDURE — 6370000000 HC RX 637 (ALT 250 FOR IP)

## 2023-05-10 PROCEDURE — 6370000000 HC RX 637 (ALT 250 FOR IP): Performed by: INTERNAL MEDICINE

## 2023-05-10 PROCEDURE — 83735 ASSAY OF MAGNESIUM: CPT

## 2023-05-10 PROCEDURE — 82947 ASSAY GLUCOSE BLOOD QUANT: CPT

## 2023-05-10 PROCEDURE — 99231 SBSQ HOSP IP/OBS SF/LOW 25: CPT | Performed by: PSYCHIATRY & NEUROLOGY

## 2023-05-10 PROCEDURE — 2060000000 HC ICU INTERMEDIATE R&B

## 2023-05-10 PROCEDURE — 2580000003 HC RX 258

## 2023-05-10 PROCEDURE — 99232 SBSQ HOSP IP/OBS MODERATE 35: CPT | Performed by: PSYCHIATRY & NEUROLOGY

## 2023-05-10 PROCEDURE — 36592 COLLECT BLOOD FROM PICC: CPT

## 2023-05-10 PROCEDURE — 6360000002 HC RX W HCPCS

## 2023-05-10 PROCEDURE — 85025 COMPLETE CBC W/AUTO DIFF WBC: CPT

## 2023-05-10 PROCEDURE — 99232 SBSQ HOSP IP/OBS MODERATE 35: CPT | Performed by: INTERNAL MEDICINE

## 2023-05-10 PROCEDURE — 80053 COMPREHEN METABOLIC PANEL: CPT

## 2023-05-10 RX ORDER — OLANZAPINE 10 MG/1
15 TABLET ORAL NIGHTLY
Status: CANCELLED | OUTPATIENT
Start: 2023-05-10

## 2023-05-10 RX ORDER — PRAVASTATIN SODIUM 40 MG
40 TABLET ORAL NIGHTLY
Status: CANCELLED | OUTPATIENT
Start: 2023-05-10

## 2023-05-10 RX ORDER — ERGOCALCIFEROL 1.25 MG/1
50000 CAPSULE ORAL WEEKLY
Status: CANCELLED | OUTPATIENT
Start: 2023-05-11 | End: 2023-06-29

## 2023-05-10 RX ORDER — TOPIRAMATE 100 MG/1
100 TABLET, FILM COATED ORAL NIGHTLY
Status: CANCELLED | OUTPATIENT
Start: 2023-05-10

## 2023-05-10 RX ORDER — METOPROLOL TARTRATE 50 MG/1
50 TABLET, FILM COATED ORAL 2 TIMES DAILY
Status: CANCELLED | OUTPATIENT
Start: 2023-05-10

## 2023-05-10 RX ORDER — FOLIC ACID 1 MG/1
1 TABLET ORAL DAILY
Status: CANCELLED | OUTPATIENT
Start: 2023-05-11

## 2023-05-10 RX ORDER — FONDAPARINUX SODIUM 10 MG/.8ML
10 INJECTION SUBCUTANEOUS DAILY
Status: CANCELLED | OUTPATIENT
Start: 2023-05-11

## 2023-05-10 RX ORDER — BENZTROPINE MESYLATE 1 MG/1
1 TABLET ORAL 2 TIMES DAILY
Status: CANCELLED | OUTPATIENT
Start: 2023-05-10

## 2023-05-10 RX ORDER — DEXTROSE MONOHYDRATE 100 MG/ML
INJECTION, SOLUTION INTRAVENOUS CONTINUOUS PRN
Status: CANCELLED | OUTPATIENT
Start: 2023-05-10

## 2023-05-10 RX ORDER — GAUZE BANDAGE 2" X 2"
100 BANDAGE TOPICAL DAILY
Status: CANCELLED | OUTPATIENT
Start: 2023-05-11

## 2023-05-10 RX ORDER — PANTOPRAZOLE SODIUM 40 MG/1
40 TABLET, DELAYED RELEASE ORAL DAILY PRN
Status: CANCELLED | OUTPATIENT
Start: 2023-05-10

## 2023-05-10 RX ORDER — INSULIN GLARGINE 100 [IU]/ML
15 INJECTION, SOLUTION SUBCUTANEOUS DAILY
Status: CANCELLED | OUTPATIENT
Start: 2023-05-11

## 2023-05-10 RX ORDER — INSULIN LISPRO 100 [IU]/ML
0-8 INJECTION, SOLUTION INTRAVENOUS; SUBCUTANEOUS EVERY 4 HOURS
Status: CANCELLED | OUTPATIENT
Start: 2023-05-10

## 2023-05-10 RX ADMIN — OLANZAPINE 15 MG: 15 TABLET, FILM COATED ORAL at 20:06

## 2023-05-10 RX ADMIN — POTASSIUM CHLORIDE 40 MEQ: 1500 TABLET, EXTENDED RELEASE ORAL at 06:14

## 2023-05-10 RX ADMIN — ACETAMINOPHEN 650 MG: 325 TABLET ORAL at 01:15

## 2023-05-10 RX ADMIN — FONDAPARINUX SODIUM 10 MG: 10 INJECTION, SOLUTION SUBCUTANEOUS at 10:38

## 2023-05-10 RX ADMIN — TOPIRAMATE 100 MG: 100 TABLET, FILM COATED ORAL at 20:07

## 2023-05-10 RX ADMIN — SODIUM CHLORIDE, PRESERVATIVE FREE 10 ML: 5 INJECTION INTRAVENOUS at 10:38

## 2023-05-10 RX ADMIN — SODIUM CHLORIDE, PRESERVATIVE FREE 10 ML: 5 INJECTION INTRAVENOUS at 20:08

## 2023-05-10 RX ADMIN — ANTI-FUNGAL POWDER MICONAZOLE NITRATE TALC FREE: 1.42 POWDER TOPICAL at 10:39

## 2023-05-10 RX ADMIN — BENZTROPINE MESYLATE 1 MG: 1 TABLET ORAL at 20:08

## 2023-05-10 RX ADMIN — POTASSIUM CHLORIDE 40 MEQ: 1500 TABLET, EXTENDED RELEASE ORAL at 10:37

## 2023-05-10 RX ADMIN — PRAVASTATIN SODIUM 40 MG: 40 TABLET ORAL at 20:07

## 2023-05-10 RX ADMIN — THIAMINE HCL TAB 100 MG 100 MG: 100 TAB at 10:37

## 2023-05-10 RX ADMIN — METOPROLOL TARTRATE 50 MG: 50 TABLET ORAL at 10:37

## 2023-05-10 RX ADMIN — BENZTROPINE MESYLATE 1 MG: 1 TABLET ORAL at 10:37

## 2023-05-10 RX ADMIN — SODIUM CHLORIDE, PRESERVATIVE FREE 10 ML: 5 INJECTION INTRAVENOUS at 20:48

## 2023-05-10 RX ADMIN — INSULIN GLARGINE 15 UNITS: 100 INJECTION, SOLUTION SUBCUTANEOUS at 10:38

## 2023-05-10 RX ADMIN — SODIUM CHLORIDE: 9 INJECTION, SOLUTION INTRAVENOUS at 04:08

## 2023-05-10 RX ADMIN — METOPROLOL TARTRATE 50 MG: 50 TABLET ORAL at 20:07

## 2023-05-10 RX ADMIN — INSULIN LISPRO 2 UNITS: 100 INJECTION, SOLUTION INTRAVENOUS; SUBCUTANEOUS at 01:21

## 2023-05-10 RX ADMIN — I-VITE, TAB 1000-60-2MG (60/BT) 1 TABLET: TAB at 10:37

## 2023-05-10 RX ADMIN — FOLIC ACID 1 MG: 1 TABLET ORAL at 10:37

## 2023-05-10 RX ADMIN — SODIUM CHLORIDE, PRESERVATIVE FREE 10 ML: 5 INJECTION INTRAVENOUS at 10:39

## 2023-05-10 RX ADMIN — ACETAMINOPHEN 650 MG: 325 TABLET ORAL at 20:06

## 2023-05-10 ASSESSMENT — PAIN DESCRIPTION - LOCATION
LOCATION: LEG
LOCATION: LEG;HIP

## 2023-05-10 ASSESSMENT — PAIN SCALES - GENERAL
PAINLEVEL_OUTOF10: 0
PAINLEVEL_OUTOF10: 3
PAINLEVEL_OUTOF10: 0
PAINLEVEL_OUTOF10: 0
PAINLEVEL_OUTOF10: 2
PAINLEVEL_OUTOF10: 3
PAINLEVEL_OUTOF10: 0

## 2023-05-10 ASSESSMENT — PAIN DESCRIPTION - DESCRIPTORS
DESCRIPTORS: ACHING
DESCRIPTORS: DISCOMFORT;ACHING

## 2023-05-10 ASSESSMENT — PAIN DESCRIPTION - FREQUENCY
FREQUENCY: INTERMITTENT
FREQUENCY: INTERMITTENT

## 2023-05-10 ASSESSMENT — PAIN DESCRIPTION - ORIENTATION
ORIENTATION: RIGHT;LEFT
ORIENTATION: RIGHT;LEFT

## 2023-05-10 ASSESSMENT — PAIN DESCRIPTION - PAIN TYPE
TYPE: ACUTE PAIN
TYPE: ACUTE PAIN

## 2023-05-10 ASSESSMENT — PAIN DESCRIPTION - ONSET
ONSET: AWAKENED FROM SLEEP
ONSET: AWAKENED FROM SLEEP

## 2023-05-10 NOTE — PLAN OF CARE
Problem: Discharge Planning  Goal: Discharge to home or other facility with appropriate resources  5/10/2023 0137 by Antwan Longoria RN  Outcome: Progressing  Note: Discharge planning will be appropriate when the patient is ready to go home or to a facility   5/9/2023 1746 by Hugh Schafer RN  Outcome: Progressing     Problem: Chronic Conditions and Co-morbidities  Goal: Patient's chronic conditions and co-morbidity symptoms are monitored and maintained or improved  5/10/2023 0137 by Antwan Longoria RN  Outcome: Progressing  Note: Chronic conditions are being managed while in the hospital   5/9/2023 1746 by Hugh Schafer RN  Outcome: Progressing     Problem: Safety - Adult  Goal: Free from fall injury  5/10/2023 0137 by Antwan Longoria RN  Outcome: Progressing  Note: Call light within reach, bed alarm on, socks on, telesitter in room  5/9/2023 1746 by Hugh Schafer RN  Outcome: Progressing     Problem: ABCDS Injury Assessment  Goal: Absence of physical injury  5/10/2023 0137 by Antwan Longoria RN  Outcome: Progressing  Note: Patient is ambulating with assistance to prevent injury  5/9/2023 1746 by Hugh Schafer RN  Outcome: Progressing     Problem: Skin/Tissue Integrity  Goal: Absence of new skin breakdown  Description: 1. Monitor for areas of redness and/or skin breakdown  2. Assess vascular access sites hourly  3. Every 4-6 hours minimum:  Change oxygen saturation probe site  4. Every 4-6 hours:  If on nasal continuous positive airway pressure, respiratory therapy assess nares and determine need for appliance change or resting period.   5/10/2023 0137 by Antwan Longoria RN  Outcome: Progressing  Note: Patient is able to move self in bed to prevent skin breakdown, wound care is also being done twice a day to prevent further skin breakdown  5/9/2023 1746 by Hugh Schafer RN  Outcome: Progressing     Problem: Pain  Goal: Verbalizes/displays adequate comfort level or baseline comfort level  5/10/2023 0137 by Antwan Longoria RN  Outcome:

## 2023-05-10 NOTE — PLAN OF CARE
Problem: Discharge Planning  Goal: Discharge to home or other facility with appropriate resources  Outcome: Progressing     Problem: Chronic Conditions and Co-morbidities  Goal: Patient's chronic conditions and co-morbidity symptoms are monitored and maintained or improved  Outcome: Progressing     Problem: Safety - Adult  Goal: Free from fall injury  Outcome: Progressing     Problem: ABCDS Injury Assessment  Goal: Absence of physical injury  Recent Flowsheet Documentation  Taken 5/10/2023 1811 by Gurpreet Sorto RN  Absence of Physical Injury: Implement safety measures based on patient assessment     Problem: Skin/Tissue Integrity  Goal: Absence of new skin breakdown  Description: 1. Monitor for areas of redness and/or skin breakdown  2. Assess vascular access sites hourly  3. Every 4-6 hours minimum:  Change oxygen saturation probe site  4. Every 4-6 hours:  If on nasal continuous positive airway pressure, respiratory therapy assess nares and determine need for appliance change or resting period.   Outcome: Progressing

## 2023-05-10 NOTE — CARE COORDINATION
Patient will discharge per [facility] once medically cleared.      Electronically signed by RIOS Martin on 5/10/2023 at 2:25 PM

## 2023-05-11 ENCOUNTER — HOSPITAL ENCOUNTER (INPATIENT)
Age: 43
LOS: 19 days | Discharge: HOME | DRG: 881 | End: 2023-05-30
Attending: PSYCHIATRY & NEUROLOGY | Admitting: PSYCHIATRY & NEUROLOGY
Payer: MEDICARE

## 2023-05-11 VITALS
BODY MASS INDEX: 42.83 KG/M2 | WEIGHT: 282.63 LBS | HEIGHT: 68 IN | RESPIRATION RATE: 16 BRPM | DIASTOLIC BLOOD PRESSURE: 74 MMHG | OXYGEN SATURATION: 96 % | HEART RATE: 77 BPM | TEMPERATURE: 97.4 F | SYSTOLIC BLOOD PRESSURE: 124 MMHG

## 2023-05-11 LAB
ABSOLUTE EOS #: 0.1 K/UL (ref 0–0.4)
ABSOLUTE LYMPH #: 1.7 K/UL (ref 1–4.8)
ABSOLUTE MONO #: 0.5 K/UL (ref 0.1–1.3)
ALBUMIN SERPL-MCNC: 2.2 G/DL (ref 3.5–5.2)
ALP SERPL-CCNC: 147 U/L (ref 35–104)
ALT SERPL-CCNC: 32 U/L (ref 5–33)
ANION GAP SERPL CALCULATED.3IONS-SCNC: 8 MMOL/L (ref 9–17)
AST SERPL-CCNC: 34 U/L
BASOPHILS # BLD: 1 % (ref 0–2)
BASOPHILS ABSOLUTE: 0.1 K/UL (ref 0–0.2)
BILIRUB SERPL-MCNC: 0.2 MG/DL (ref 0.3–1.2)
BUN SERPL-MCNC: <2 MG/DL (ref 6–20)
CALCIUM SERPL-MCNC: 8.3 MG/DL (ref 8.6–10.4)
CHLORIDE SERPL-SCNC: 112 MMOL/L (ref 98–107)
CO2 SERPL-SCNC: 22 MMOL/L (ref 20–31)
CREAT SERPL-MCNC: 0.46 MG/DL (ref 0.5–0.9)
EOSINOPHILS RELATIVE PERCENT: 2 % (ref 0–4)
GFR SERPL CREATININE-BSD FRML MDRD: >60 ML/MIN/1.73M2
GLUCOSE BLD-MCNC: 162 MG/DL (ref 65–105)
GLUCOSE BLD-MCNC: 72 MG/DL (ref 65–105)
GLUCOSE SERPL-MCNC: 150 MG/DL (ref 70–99)
HCT VFR BLD AUTO: 32.6 % (ref 36–46)
HGB BLD-MCNC: 11.5 G/DL (ref 12–16)
LYMPHOCYTES # BLD: 36 % (ref 24–44)
MCH RBC QN AUTO: 33.4 PG (ref 26–34)
MCHC RBC AUTO-ENTMCNC: 35.2 G/DL (ref 31–37)
MCV RBC AUTO: 95 FL (ref 80–100)
MONOCYTES # BLD: 11 % (ref 1–7)
PDW BLD-RTO: 16.3 % (ref 11.5–14.9)
PLATELET # BLD AUTO: 294 K/UL (ref 150–450)
PMV BLD AUTO: 8.9 FL (ref 6–12)
POTASSIUM SERPL-SCNC: 3.8 MMOL/L (ref 3.7–5.3)
PROT SERPL-MCNC: 5.1 G/DL (ref 6.4–8.3)
RBC # BLD: 3.44 M/UL (ref 4–5.2)
SEG NEUTROPHILS: 50 % (ref 36–66)
SEGMENTED NEUTROPHILS ABSOLUTE COUNT: 2.3 K/UL (ref 1.3–9.1)
SODIUM SERPL-SCNC: 142 MMOL/L (ref 135–144)
WBC # BLD AUTO: 4.6 K/UL (ref 3.5–11)

## 2023-05-11 PROCEDURE — 1240000000 HC EMOTIONAL WELLNESS R&B

## 2023-05-11 PROCEDURE — 97116 GAIT TRAINING THERAPY: CPT

## 2023-05-11 PROCEDURE — 36415 COLL VENOUS BLD VENIPUNCTURE: CPT

## 2023-05-11 PROCEDURE — 97530 THERAPEUTIC ACTIVITIES: CPT

## 2023-05-11 PROCEDURE — 6370000000 HC RX 637 (ALT 250 FOR IP)

## 2023-05-11 PROCEDURE — 6370000000 HC RX 637 (ALT 250 FOR IP): Performed by: INTERNAL MEDICINE

## 2023-05-11 PROCEDURE — 80053 COMPREHEN METABOLIC PANEL: CPT

## 2023-05-11 PROCEDURE — 82947 ASSAY GLUCOSE BLOOD QUANT: CPT

## 2023-05-11 PROCEDURE — 97110 THERAPEUTIC EXERCISES: CPT

## 2023-05-11 PROCEDURE — 85025 COMPLETE CBC W/AUTO DIFF WBC: CPT

## 2023-05-11 PROCEDURE — 99212 OFFICE O/P EST SF 10 MIN: CPT

## 2023-05-11 PROCEDURE — 99232 SBSQ HOSP IP/OBS MODERATE 35: CPT | Performed by: INTERNAL MEDICINE

## 2023-05-11 PROCEDURE — 99232 SBSQ HOSP IP/OBS MODERATE 35: CPT | Performed by: PSYCHIATRY & NEUROLOGY

## 2023-05-11 PROCEDURE — 6360000002 HC RX W HCPCS

## 2023-05-11 PROCEDURE — 97535 SELF CARE MNGMENT TRAINING: CPT

## 2023-05-11 RX ORDER — HYDROXYZINE 50 MG/1
50 TABLET, FILM COATED ORAL 3 TIMES DAILY PRN
Status: DISCONTINUED | OUTPATIENT
Start: 2023-05-11 | End: 2023-05-30 | Stop reason: HOSPADM

## 2023-05-11 RX ORDER — MAGNESIUM HYDROXIDE/ALUMINUM HYDROXICE/SIMETHICONE 120; 1200; 1200 MG/30ML; MG/30ML; MG/30ML
30 SUSPENSION ORAL EVERY 6 HOURS PRN
Status: DISCONTINUED | OUTPATIENT
Start: 2023-05-11 | End: 2023-05-30 | Stop reason: HOSPADM

## 2023-05-11 RX ORDER — IBUPROFEN 400 MG/1
400 TABLET ORAL EVERY 6 HOURS PRN
Status: DISCONTINUED | OUTPATIENT
Start: 2023-05-11 | End: 2023-05-11

## 2023-05-11 RX ORDER — TRAZODONE HYDROCHLORIDE 50 MG/1
50 TABLET ORAL NIGHTLY PRN
Status: DISCONTINUED | OUTPATIENT
Start: 2023-05-11 | End: 2023-05-19

## 2023-05-11 RX ORDER — POLYETHYLENE GLYCOL 3350 17 G/17G
17 POWDER, FOR SOLUTION ORAL DAILY PRN
Status: DISCONTINUED | OUTPATIENT
Start: 2023-05-11 | End: 2023-05-30 | Stop reason: HOSPADM

## 2023-05-11 RX ORDER — ACETAMINOPHEN 325 MG/1
650 TABLET ORAL EVERY 6 HOURS PRN
Status: DISCONTINUED | OUTPATIENT
Start: 2023-05-11 | End: 2023-05-11

## 2023-05-11 RX ADMIN — I-VITE, TAB 1000-60-2MG (60/BT) 1 TABLET: TAB at 10:55

## 2023-05-11 RX ADMIN — THIAMINE HCL TAB 100 MG 100 MG: 100 TAB at 10:52

## 2023-05-11 RX ADMIN — FOLIC ACID 1 MG: 1 TABLET ORAL at 10:53

## 2023-05-11 RX ADMIN — ERGOCALCIFEROL 50000 UNITS: 1.25 CAPSULE ORAL at 10:55

## 2023-05-11 RX ADMIN — FONDAPARINUX SODIUM 10 MG: 10 INJECTION, SOLUTION SUBCUTANEOUS at 10:54

## 2023-05-11 RX ADMIN — METOPROLOL TARTRATE 50 MG: 50 TABLET ORAL at 10:53

## 2023-05-11 RX ADMIN — BENZTROPINE MESYLATE 1 MG: 1 TABLET ORAL at 10:00

## 2023-05-11 ASSESSMENT — SLEEP AND FATIGUE QUESTIONNAIRES
AVERAGE NUMBER OF SLEEP HOURS: 6
SLEEP PATTERN: DIFFICULTY FALLING ASLEEP;DISTURBED/INTERRUPTED SLEEP
DO YOU HAVE DIFFICULTY SLEEPING: YES
DO YOU USE A SLEEP AID: NO

## 2023-05-11 NOTE — PLAN OF CARE
Problem: Discharge Planning  Goal: Discharge to home or other facility with appropriate resources  5/11/2023 0638 by Lucía Chacon RN  Outcome: Progressing     Problem: Chronic Conditions and Co-morbidities  Goal: Patient's chronic conditions and co-morbidity symptoms are monitored and maintained or improved  5/11/2023 0638 by Lucía Chacon RN  Outcome: Progressing     Problem: Safety - Adult  Goal: Free from fall injury  5/11/2023 0638 by Lucía Chacon RN  Outcome: Progressing     Problem: ABCDS Injury Assessment  Goal: Absence of physical injury  Outcome: Progressing  Flowsheets (Taken 5/10/2023 1811 by Shira Henao RN)  Absence of Physical Injury: Implement safety measures based on patient assessment     Problem: Skin/Tissue Integrity  Goal: Absence of new skin breakdown  Description: 1. Monitor for areas of redness and/or skin breakdown  2. Assess vascular access sites hourly  3. Every 4-6 hours minimum:  Change oxygen saturation probe site  4. Every 4-6 hours:  If on nasal continuous positive airway pressure, respiratory therapy assess nares and determine need for appliance change or resting period. 5/11/2023 7109 by Lucía Chacon RN  Outcome: Progressing     Problem: Pain  Goal: Verbalizes/displays adequate comfort level or baseline comfort level  5/11/2023 0638 by Lucía Chacon RN  Outcome: Progressing     Problem: Nutrition Deficit:  Goal: Optimize nutritional status  Outcome: Progressing     Problem: Self Harm/Suicidality  Goal: Will have no self-injury during hospital stay  Description: INTERVENTIONS:  1. Ensure constant observer at bedside with Q15M safety checks  2. Maintain a safe environment  3. Secure patient belongings  4. Ensure family/visitors adhere to safety recommendations  5. Ensure safety tray has been added to patient's diet order  6.   Every shift and PRN: Re-assess suicidal risk via Frequent Screener    Outcome: Progressing

## 2023-05-11 NOTE — BH NOTE
Patient gave writer verbal permission and asked writer to complete a LOLY for her mother, Loida Resendiz.

## 2023-05-11 NOTE — BH NOTE
Patients mother would like care team to know that patient's ex  Carmen Clemons, is \"no good\" and suggests that he not be allowed to visit and or know that she is here while patient is still \"out of it\" and unable to make the decision for herself to provide him with her code for [facility].

## 2023-05-11 NOTE — CARE COORDINATION
ONGOING DISCHARGE PLAN:    Patient is alert and oriented x4. Spoke with patient regarding discharge plan and patient confirms that plan is still to discharge to [facility]     Patient will discharge to [facility] when medically cleared      Will continue to follow for additional discharge needs. If patient is discharged prior to next notation, then this note serves as note for discharge by case management.     Electronically signed by Jonas Qureshi RN on 5/11/2023 at 1:41 PM

## 2023-05-11 NOTE — PROGRESS NOTES
05/09/23 1937   Encounter Summary   Encounter Overview/Reason  Spiritual/Emotional Needs   Service Provided For: Patient   Referral/Consult From: Rounding   Complexity of Encounter Low   Spiritual/Emotional needs   Type Spiritual Support   Assessment/Intervention/Outcome   Assessment Unable to assess  (sleeping)   Intervention Prayer (assurance of)/Dillon
[facility]   INPATIENT OCCUPATIONAL THERAPY  PROGRESS NOTE  Date: 2023  Patient Name: Fay Walls       Room: 1580/6691-10  MRN: [id number]    : 1980  (36 y.o.)  Gender: female   Referring Practitioner: Lenora Morales MD  Diagnosis: Chest pain; Hyperglycemia; Alcohol withdrawal syndrome without complication; chest pain, unspecified type    Discharge Recommendations:  Further Occupational Therapy is recommended upon facility discharge. OT Equipment Recommendations  Other: TBD      Restrictions/Precautions  Restrictions/Precautions  Restrictions/Precautions: Up as Tolerated; Fall Risk;Contact Precautions; Seizure  Required Braces or Orthoses?: No  Implants present? : Metal implants (IVC filter)  Position Activity Restriction  Other position/activity restrictions: OT/PT eval and treat         Subjective  Subjective  Subjective: PEBBLES Martinez treatment, co-tx with Meri DIAZ. Comments: Pt is slow to respond to questions, soft spoken. Pt pleasant and agreeable to participate. Objective  Orientation  Overall Orientation Status: Impaired  Orientation Level: Oriented to place; Disoriented to situation;Oriented to time;Oriented to person (states year but unable to report month.)  Cognition  Overall Cognitive Status: Exceptions  Arousal/Alertness: Delayed responses to stimuli;Inconsistent responses to stimuli  Following Commands: Follows one step commands with increased time; Follows one step commands with repetition  Attention Span: Attends with cues to redirect  Memory: Decreased recall of recent events;Decreased short term memory;Decreased recall of precautions  Safety Judgement: Decreased awareness of need for assistance;Decreased awareness of need for safety  Problem Solving: Decreased awareness of errors  Insights: Decreased awareness of deficits  Initiation: Requires cues for some  Sequencing: Requires cues for some    Activities of Daily
[address]   INPATIENT OCCUPATIONAL THERAPY  PROGRESS NOTE  Date: 2023  Patient Name: Rachel Bone       Room: 5121/8793-39  MRN: [id number]    : 1980  (36 y.o.)  Gender: female   Referring Practitioner: Christian Wilson MD  Diagnosis: Chest pain; Hyperglycemia; Alcohol withdrawal syndrome without complication; chest pain, unspecified type    Discharge Recommendations:  Further Occupational Therapy is recommended upon facility discharge. OT Equipment Recommendations  Other: TBD      Restrictions/Precautions  Restrictions/Precautions  Restrictions/Precautions: Up as Tolerated; Fall Risk;Contact Precautions; Seizure  Required Braces or Orthoses?: No  Implants present? : Metal implants (IVC filter)  Position Activity Restriction  Other position/activity restrictions: OT/PT eval and treat         Subjective  Subjective  Pain: pt denies pain. Comments: PEBBLES Bowden'robert treatment, co-tx with Lynne DIAZ. Pt is slow to respond to questions, soft spoken. Pt pleasant and cooperative to participate. Pt now with 1:1    Objective  Orientation  Overall Orientation Status: Impaired  Orientation Level: Disoriented to situation;Oriented to person;Disoriented to place; Disoriented to time (Place: Verta Craw, Time 2022, Situation ? ??)  Cognition  Overall Cognitive Status: Exceptions  Arousal/Alertness: Delayed responses to stimuli;Inconsistent responses to stimuli  Following Commands: Follows one step commands with increased time; Follows one step commands with repetition  Attention Span: Attends with cues to redirect  Memory: Decreased recall of recent events;Decreased short term memory;Decreased recall of precautions  Safety Judgement: Decreased awareness of need for assistance;Decreased awareness of need for safety  Problem Solving: Decreased awareness of errors  Insights: Decreased awareness of deficits  Initiation: Requires cues for all  Sequencing: Requires
[address] [facility].    PROGRESS NOTE             5/10/2023    12:59 PM    Name:   Russel Vegas  MRN:     [id number]     Acct:      [de-identified]   Room:   2101/2101-01  IP Day:  6  Admit Date:  4/29/2023 11:30 AM    PCP:  Gibran RODRIGUEZ  Code Status:  Full Code    Subjective:     C/C:   Chief Complaint   Patient presents with    Fatigue    Chest Pain    Hyperglycemia     Interval History Status: improved. There are no major updates on this patient's  Mental and medical condition has been stable    Brief History:     The patient is a 36 y.o. Non- / non  female who presents withFatigue, Chest Pain, and Hyperglycemia   and she is admitted to the hospital for the management of alcohol withdrawal hypoglycemia  Patient has a history of alcohol abuse and binge drinking. She states that the current binging episode started about 1 week ago, she drinks around 30 shots per day, most recent drink was 12 hours before admission (around 4 am). She presented to [facility] ED with complaints of with chest tightness and alcohol withdrawal symptoms with shaking. Patient describes that she usually has this type of chest tightness during his episodes of supraventricular tachycardia. At time of my evaluation in ED, patient denies having chest pain or tightness, states that it resolved by itself. PMH also significant for morbid obesity, diabetes mellitus type 2 (on Lantus 35 units daily) and antiphospholipid syndrome with previous history of multiple PE (on fondaparinux). Patient reports since that she has not been taking her medications and insulin for the last couple of days. EKG was negative for any abnormalities. blood work significant for hyperglycemia with blood glucose of 527, showed normal troponin x2, D-dimer was slightly elevated -1.85, CT came back negative for pulmonary embolism.   Patient was given 14 units of Humalog and 2 g
[address]    PROGRESS NOTE             5/8/2023    7:54 AM    Name:   Mireille Ventura  MRN:     [id number]     Acct:      [de-identified]   Room:   2101/2101-01  IP Day:  9  Admit Date:  4/29/2023 11:30 AM    PCP:  [facility] MARRIOTT  Code Status:  Full Code    Subjective:     C/C:   Chief Complaint   Patient presents with    Fatigue    Chest Pain    Hyperglycemia     Interval History Status: improved. Patient was seen and evaluated at bedside. She is comfortably resting in the bed, on 2 L oxygen by nasal cannula. Nursing staff, patient tried to get out of the bed this night without using bed alarm, patient is still confused and is very difficult to redirect. TeleSitter was placed  From medical standpoint patient is stable, awaiting MRI and EEG to be done and afterwards most likely patient will be transferred to [facility]. Brief History:     The patient is a 36 y.o. Non- / non  female who presents withFatigue, Chest Pain, and Hyperglycemia   and she is admitted to the hospital for the management of alcohol withdrawal hypoglycemia  Patient has a history of alcohol abuse and binge drinking. She states that the current binging episode started about 1 week ago, she drinks around 30 shots per day, most recent drink was 12 hours before admission (around 4 am). She presented to [facility] ED with complaints of with chest tightness and alcohol withdrawal symptoms with shaking. Patient describes that she usually has this type of chest tightness during his episodes of supraventricular tachycardia. At time of my evaluation in ED, patient denies having chest pain or tightness, states that it resolved by itself. PMH also significant for morbid obesity, diabetes mellitus type 2 (on Lantus 35 units daily) and antiphospholipid syndrome with previous history of multiple PE (on fondaparinux).   Patient reports since that she has not
Be Guy MD/Trevor Black MD/ Michael Duncan MD/Dr Brenda MENCHACA AGACNP-BC, NP-C      Angelic MENCHACA NP-C     Arik Herman APRRADHA NP-C                                           Pulmonary Progress Note    Patient - Demian Cancino   Age - 36 y.o.   - 1980  MRN - [id number]  Acct # - [de-identified]  Date of Admission - 2023 11:30 AM    Consulting Service/Physician:       Primary Care Physician: Evon Galazra    SUBJECTIVE:     Chief Complaint:   Chief Complaint   Patient presents with    Fatigue    Chest Pain    Hyperglycemia     Subjective:    Lorena Mccabe is seen laying in bed on 2 L nasal cannula. Pulse ox is 99%. She was able to get up to the bathroom with assistance but continues to be drowsy. MRI brain is pending. Barium swallow study was normal.  She is being monitored off antibiotics. VITALS  /64   Pulse 81   Temp 97.6 °F (36.4 °C) (Axillary)   Resp 18   Ht 5' 8\" (1.727 m)   Wt 282 lb 10.1 oz (128.2 kg)   SpO2 100%   BMI 42.97 kg/m²   Wt Readings from Last 3 Encounters:   23 282 lb 10.1 oz (128.2 kg)   23 278 lb 14.4 oz (126.5 kg)   23 275 lb 2.2 oz (124.8 kg)     I/O (24 Hours)    Intake/Output Summary (Last 24 hours) at 2023 1117  Last data filed at 2023 9253  Gross per 24 hour   Intake --   Output 3150 ml   Net -3150 ml     Ventilator:   Settings  FiO2 : 30 %  Insp Time (sec): 1 sec  Flow Sensitivity: 5 L/min  Exam:   Physical Exam   Constitutional: Obese female lying in bed on 2 L in no acute distress   HENT: Unremarkable  Head: Normocephalic and atraumatic. Eyes: EOM are normal. Pupils are equal, round, and reactive to light. Neck: Neck supple. Cardiovascular:  Regular rate and rhythm. Normal heart tones. No JVD. Pulmonary/Chest: Respirations even and unlabored, lungs diminished in the bases, on 2 L with pulse ox 99%   Abdominal: Soft.  Bowel sounds are
Be Guy MD/Trevor Silverio MD/ Tucker Martinez MD/Dr Gregory Zuñiga APRN AGACNP-BC, NP-C      Alaina Morales APRN NP-C     Pablo Home APRN NP-C                                           Pulmonary Progress Note    Patient - Munira Sigala   Age - 36 y.o.   - 1980  MRN - [id number]  Acct # - [de-identified]  Date of Admission - 2023 11:30 AM    Consulting Service/Physician:       Primary Care Physician: Darinel Howard    SUBJECTIVE:     Chief Complaint:   Chief Complaint   Patient presents with    Fatigue    Chest Pain    Hyperglycemia     Subjective:    Felix Stewart is seen laying in bed on chair. She had MRI brain yesterday without any acute findings. She does seem a little more arousable today. She has been afebrile. Agapito Chung VITALS  /68   Pulse 79   Temp 97.6 °F (36.4 °C) (Axillary)   Resp 18   Ht 5' 8\" (1.727 m)   Wt 282 lb 10.1 oz (128.2 kg)   SpO2 100%   BMI 42.97 kg/m²   Wt Readings from Last 3 Encounters:   23 282 lb 10.1 oz (128.2 kg)   23 278 lb 14.4 oz (126.5 kg)   23 275 lb 2.2 oz (124.8 kg)     I/O (24 Hours)    Intake/Output Summary (Last 24 hours) at 2023 2385  Last data filed at 2023 1262  Gross per 24 hour   Intake --   Output 2000 ml   Net -2000 ml     Ventilator:   Settings  FiO2 : 30 %  Insp Time (sec): 1 sec  Flow Sensitivity: 5 L/min  Exam:   Physical Exam   Constitutional: Obese female lying in bed on room air in no acute distress   HENT: Unremarkable  Head: Normocephalic and atraumatic. Eyes: EOM are normal. Pupils are equal, round, and reactive to light. Neck: Neck supple. Cardiovascular:  Regular rate and rhythm. Normal heart tones. No JVD. Pulmonary/Chest: Respirations even and unlabored, lungs diminished in the bases, on room air with pulse ox 99%   Abdominal: Soft. Bowel sounds are normal.   Musculoskeletal: Normal range of motion.    Neurological: Patient is
Be Guy MD/Trevor Viera MD/ Tootie Sands MD/Dr Satnam Allison APRN AGACNP-BC, NP-C      Azul Fajardo APRN NP-C     Edil Robertsr APRN NP-C                                           Pulmonary Progress Note    Patient - Nichole Aldana   Age - 36 y.o.   - 1980  MRN - [id number]  Acct # - [de-identified]  Date of Admission - 2023 11:30 AM    Consulting Service/Physician:       Primary Care Physician: Toya Hernández    SUBJECTIVE:     Chief Complaint:   Chief Complaint   Patient presents with    Fatigue    Chest Pain    Hyperglycemia     Subjective:    Saturnino Kendrick is seen sitting up at the edge of the bed. She is more alert currently than I have seen her. She remains on room air. She has been afebrile. Nikhil Dallas VITALS  BP (!) 143/85   Pulse 84   Temp 98.4 °F (36.9 °C) (Oral)   Resp 16   Ht 5' 8\" (1.727 m)   Wt 282 lb 10.1 oz (128.2 kg)   SpO2 100%   BMI 42.97 kg/m²   Wt Readings from Last 3 Encounters:   23 282 lb 10.1 oz (128.2 kg)   23 278 lb 14.4 oz (126.5 kg)   23 275 lb 2.2 oz (124.8 kg)     I/O (24 Hours)    Intake/Output Summary (Last 24 hours) at 5/10/2023 1211  Last data filed at 5/10/2023 0408  Gross per 24 hour   Intake 480 ml   Output 500 ml   Net -20 ml     Ventilator:   Settings  FiO2 : 30 %  Insp Time (sec): 1 sec  Flow Sensitivity: 5 L/min  Exam:   Physical Exam   Constitutional: Obese female sitting at the edge of the bed in no distress on room air   HENT: Unremarkable  Head: Normocephalic and atraumatic. Eyes: EOM are normal. Pupils are equal, round, and reactive to light. Neck: Neck supple. Cardiovascular:  Regular rate and rhythm. Normal heart tones. No JVD. Pulmonary/Chest: Respirations even and unlabored, lungs diminished in the bases, on room air with pulse ox 99%   Abdominal: Soft. Bowel sounds are normal.   Musculoskeletal: Normal range of motion.    Neurological: Patient is
Comprehensive Nutrition Assessment    Type and Reason for Visit:  Reassess    Nutrition Recommendations/Plan:   Continue diet as ordered. Malnutrition Assessment:  Malnutrition Status:  Mild malnutrition (05/01/23 1516)    Context:  Chronic Illness     Findings of the 6 clinical characteristics of malnutrition:  Energy Intake:  Mild decrease in energy intake (Comment)  Weight Loss:  5% over 1 month     Body Fat Loss:  Unable to assess     Muscle Mass Loss:  Mild muscle mass loss Temples (temporalis)  Fluid Accumulation:  Mild Extremities   Strength:  Not Performed    Nutrition Assessment:    Pt remains encephalopathic often quite lethargic. Speech did MBSS yesterday with recommendation for pureed with thin liquids. When medically cleared will go to [facility]. Nutrition Related Findings:    mild edema all extremities, Labs/Meds: Reviewed, PMH: DM, Antipohspholipid Sx Wound Type:  (Abd wound)       Current Nutrition Intake & Therapies:    Average Meal Intake: 1-25%, 26-50%     ADULT DIET; Dysphagia - Pureed; 3 carb choices (45 gm/meal)    Anthropometric Measures:  Height: 5' 8\" (172.7 cm)  Ideal Body Weight (IBW): 140 lbs (64 kg)    Admission Body Weight: 272 lb (123.4 kg)  Current Body Weight: 282 lb (127.9 kg),   IBW.  Weight Source: Bed Scale  Current BMI (kg/m2): 42.9  Usual Body Weight: 306 lb (138.8 kg) (3/18)  % Weight Change (Calculated): -11.1                    BMI Categories: Obese Class 3 (BMI 40.0 or greater)    Estimated Daily Nutrient Needs:  Energy Requirements Based On: Formula  Weight Used for Energy Requirements: Ideal  Energy (kcal/day): [place] x 1.3= 4977-7439 kcal  Weight Used for Protein Requirements: Ideal  Protein (g/day): 2g/kg= 125-130 g      Nutrition Diagnosis:   Inadequate oral intake related to impaired respiratory function as evidenced by intake 26-50%    Nutrition Interventions:   Food and/or Nutrient Delivery: Continue Current Diet  Nutrition Education/Counseling: No
Department of Psychiatry  Consult Progress Note  5/11/2023    Patient Name: Neeraj Genao    Reason for initial consult:  Suicide attempt by overdose, medication management          Interim History:      Noted that \"The patient is a 36 y.o. female with significant psychiatric history of bipolar disorder, depression, diabetes, SVT, substance abuse: Alcohol, opiate. Who is admitted medically multiple times in the past month for chest pain, shortness of breath complicated by alcohol abuse. Most recently on 4/29/2023 patient presented to the emergency room with complaints of chest pain and alcohol withdrawal.  On 4/30/2023 patient was found unresponsive and an unlabeled bottle was found at her bedside. It is presumed she overdosed on Percocet and required 2 doses of Narcan, transfer to the ICU and intubation. UDS positive for oxycodone after overdose. \"    The patient was seen at bedside. The patient was participating in physical therapy though she seemed to be unmotivated. She continues to be mumbling in her speech. She is difficult to understand. She is making better eye contact and engaging a little better. She continues to be depressed. We will admitted to psychiatry when she is medically cleared. Mental Status Exam  Level of consciousness: Lethargic  Appearance: Appropriate attire for setting, resting in bed, with poor grooming and hygiene   Behavior/Motor: Psychomotor slowing  Attitude toward examiner: Poor cooperation, poor attention, poor eye contact  Speech: slow and low volume but coherent  Mood: constricted  Affect: Depressed  Thought processes: Linear and coherent  Thought content: Does not report homicidal ideation  Suicidal Ideation: passive  Delusions: No evidence of delusions. Perceptual Disturbance: Does not acknowledge presence of auditory or visual hallucinations. Patient does not appear to be responding to internal stimuli.    Cognition: oriented to time place and
Department of Psychiatry  Consult Progress Note  5/8/2023    Patient Name: Alan Lin    Reason for initial consult:  Suicide attempt by overdose, medication management          Interim History:      Noted that \"The patient is a 36 y.o. female with significant psychiatric history of bipolar disorder, depression, diabetes, SVT, substance abuse: Alcohol, opiate. Who is admitted medically multiple times in the past month for chest pain, shortness of breath complicated by alcohol abuse. Most recently on 4/29/2023 patient presented to the emergency room with complaints of chest pain and alcohol withdrawal.  On 4/30/2023 patient was found unresponsive and an unlabeled bottle was found at her bedside. It is presumed she overdosed on Percocet and required 2 doses of Narcan, transfer to the ICU and intubation. UDS positive for oxycodone after overdose. \"    The patient was seen at bedside. Her sitter and her mother were present. The patient is mumbling in her speech. She is difficult to follow. As per the patient's mother she is in a toxic relationship with her . The patient has been admitted to psychiatry 4-5 times. In the past she has been treated with Wellbutrin and Paxil. We will continue with the plan to admit the patient to psychiatry once she is medically cleared. Mental Status Exam  Level of consciousness: Lethargic  Appearance: Appropriate attire for setting, resting in bed, with poor grooming and hygiene   Behavior/Motor: Psychomotor slowing  Attitude toward examiner: Poor cooperation, poor attention, poor eye contact  Speech: slow, mumbled/slurred  Mood: unable to assess  Affect: Depressed  Thought processes: slow, limited  Thought content: Does not report homicidal ideation  Suicidal Ideation: unable to assss  Delusions: No evidence of delusions. Perceptual Disturbance: Does not acknowledge presence of auditory or visual hallucinations.   Patient does not appear to be responding
[address]   OCCUPATIONAL THERAPY MISSED TREATMENT NOTE   INPATIENT   Date: 5/10/23  Patient Name: Alan Lin       Room: 7428/0123-25  MRN: [id number]   Account #: [de-identified]    : 1980  (36 y.o.)  Gender: female   Referring Practitioner: Pippa Albrecht MD  Diagnosis: Chest pain; Hyperglycemia; Alcohol withdrawal syndrome without complication; chest pain, unspecified type             REASON FOR MISSED TREATMENT:  Hold PT/OT per nursing request   -   Other - per nursing pt unable to tolerate therapy at this time.  Will check back as time permits in PM      Electronically signed by ROBERTO Mckeon on 5/10/23 at 10:11 AM EDT
[address]   INPATIENT OCCUPATIONAL THERAPY  PROGRESS NOTE  Date: 2023  Patient Name: Tripp Flores       Room: 9772/7497-69  MRN: [id number]    : 1980  (36 y.o.)  Gender: female   Referring Practitioner: Alex Brown MD  Diagnosis: Chest pain; Hyperglycemia; Alcohol withdrawal syndrome without complication; chest pain, unspecified type    Discharge Recommendations:  Further Occupational Therapy is recommended upon facility discharge. OT Equipment Recommendations  Other: TBD      Restrictions/Precautions  Restrictions/Precautions  Restrictions/Precautions: Up as Tolerated; Fall Risk;Contact Precautions; Seizure  Required Braces or Orthoses?: No  Implants present? : Metal implants         Subjective  Subjective  Subjective: Pt agreeable to therapy. CO-tx completed with PTA  Subjective  Pain: reports pain in stomach and hips 8/10. Comments: Pt is slow to respond to question    Objective  Orientation  Overall Orientation Status: Within Functional Limits  Orientation Level: Oriented to place;Oriented to time;Oriented to person  Cognition  Overall Cognitive Status: Exceptions  Arousal/Alertness: Delayed responses to stimuli;Inconsistent responses to stimuli  Following Commands: Follows one step commands with increased time; Follows one step commands with repetition  Attention Span: Attends with cues to redirect  Safety Judgement: Decreased awareness of need for assistance;Decreased awareness of need for safety  Problem Solving: Decreased awareness of errors  Insights: Decreased awareness of deficits  Initiation: Requires cues for all  Sequencing: Requires cues for all    Activities of Daily Living  ADL  Feeding: NPO  Grooming: Setup, Increased time to complete  Grooming Skilled Clinical Factors: pt requires VCs for initiation and continuation of tasks.  pt washes hands/face sitting EOB  UE Bathing: Minimal assistance  LE Bathing: Maximum assistance  UE Dressing: Minimal
[facility] Contin[facility]  Progress Note      NAME:  Brennen [facility] RECORD NUMBER:  [id number]  AGE: 36 y.o. GENDER: female  : 1980  TODAY'S DATE:  2023    [facility] nurse follow up visit for wounds to abdominal and groin folds. Pt more alert today, but speaks very slowly. She tells me that the wounds \"sting\" a little. The denudation to her abdominal and  groin folds has completely healed, leaving only the linear open areas to be treated. Open areas much improved since last visit. Will continue the thin layer of triad cream to folds.        Measurements:  Wound 23 Abdomen Abdominal/groin folds (Active)   Wound Etiology Other 23 1017   Dressing Status Old drainage noted 23 1017   Wound Cleansed Soap and water 23 1017   Dressing/Treatment Triad hydro/zinc oxide-based hydrophilic paste  1676   Wound Assessment Pink/red 23 1017   Drainage Amount Small 23 1017   Drainage Description Serosanguinous 23 1017   Odor None 23 1017   Valerie-wound Assessment Fragile 23 1017   Margins Defined edges 23 1017   [facility] [place] [place]  Wound, Ostomy, and Continence Nursing  [phone]
[facility]   IN-PATIENT SERVICE      NEUROLOGY PROGRESS  NOTE            Date:   5/8/2023  Patient name:  Anne Barakat  Date of admission:  4/29/2023  YOB: 1980      Interval History:     Patient is awake and sitting on side of bed. She has delayed responses and limited cognitive interactions at this time. Her mother is at bedside able to provide some information. Patient has multiple comorbidities noted including morbid obesity BMI 42.97. Patient's had gastric bypass performed. Patient does have a history of B12 deficiency. She is stated to have peripheral neuropathy as well. Has positive Awa-en-Y, GERD, type 2 diabetes, depression with suicidal ideation. Nonalcoholic fatty liver disease. History of drug abuse. See problem list for additional issues. Patient is cooperative at this time. She is aware that she is in [facility] and that is 2023. History of Present Illness: The patient is a 36 y.o. female who presents with Fatigue, Chest Pain, and Hyperglycemia  . The patient was seen and examined and the chart was reviewed. Patient admitted on 4/29/2023 following opioid and alcohol overdose. Suspected anoxic injury. Neurology is seeing this patient on 5/7/2023 for altered mentation. Noted was a history of multiple PEs secondary to antiphospholipid lipid syndrome. Patient received subcu fondaparinux 10 mg daily for anticoagulation. Patient also had supra ventricular tachycardia for which troponins were negative. Patient extubated on 5/4/2023 and appears encephalopathic since that time. No seizure activity identified. No history of stroke identified. No headache reported. No speech or swallowing difficulties. Patient was noted to be slow to respond. Not on sedatives. 5/7/2023 she was able to follow simple commands. Patient currently on puréed diet.     Past Medical History:     Past Medical History:   Diagnosis Date    Alcohol abuse
[facility]   IN-PATIENT SERVICE      NEUROLOGY PROGRESS  NOTE            Date:   5/10/2023  Patient name:  Azalia Jolley  Date of admission:  4/29/2023  YOB: 1980      Interval History:     MRI of the brain has been completed. No acute changes identified. Patient's mother is present in the room at time of this evaluation. Mother reports that the patient was able to get up out of bed by herself and go to the bathroom to get back into bed. When I speak with the patient she can barely speak her name. She has what appears to be marked weakness of her arms and legs and seems to almost to be unable to move. History of Present Illness: The patient is a 36 y.o. female who presents with Fatigue, Chest Pain, and Hyperglycemia  . The patient was seen and examined and the chart was reviewed. Neurology is seeing this patient on 5/7/2023 for altered mentation. Noted was a history of multiple PEs secondary to antiphospholipid lipid syndrome. Patient received subcu fondaparinux 10 mg daily for anticoagulation. Patient also had supra ventricular tachycardia for which troponins were negative. Patient extubated on 5/4/2023 and appears encephalopathic since that time. No seizure activity identified. No history of stroke identified. No headache reported. No speech or swallowing difficulties. Patient was noted to be slow to respond. Not on sedatives. 5/7/2023 she was able to follow simple commands. Patient currently on puréed diet.     Past Medical History:     Past Medical History:   Diagnosis Date    Alcohol abuse     Recurrent episodes of withdrawal    Anxiety     Arthritis     Painting esophagus     Benzodiazepine overdose 09/26/2015    Bipolar disorder, unspecified ([address]     Bipolar I disorder, most recent episode depressed, severe without psychotic features ([address]     Cellulitis 11/17/2018    Chronic abdominal wound infection 09/27/2015    Constipation 09/03/2019
Patient was confused and hard to understand; welcomed prayer     05/10/23 3640   Encounter Summary   Encounter Overview/Reason  Spiritual/Emotional Needs   Service Provided For: Patient   Referral/Consult From: Roseline   Last Encounter  05/10/23   Complexity of Encounter Moderate   Spiritual/Emotional needs   Type Spiritual Support   Assessment/Intervention/Outcome   Assessment Impaired social interaction; Powerlessness   Intervention Sustaining Presence/Ministry of presence;Prayer (assurance of)/Coleman   Outcome Receptive;Engaged in conversation
Patient's earrings removed for MRI and sent home with mother.
Per pt she has piercing in ears that cannot come out. They need removed at piercing shop. Talked to PEBBLES Travis about MRI policy at [facility] we do not scan piercing's. These piercing's are in the area of interest as well and will cause giant artifact on the imaging. Also metal in the MRI room has the potential of heating and can burn pt, due to pt safety we cannot scan her until the earrings are removed. They need to come out in order to be scanned. If there are any questions please call [phone]. Thanks!
Physical Therapy        Physical Therapy Cancel Note      DATE: 5/10/2023    NAME: Nichole Agarwal  MRN: [id number]   : 1980      Patient not seen this date for Physical Therapy due to: Other: Per PEBBLES [place], defer PT at this time d/t pt's tolerance. Will check back later as time permits.        Electronically signed by Woody Navarrete PTA on 5/10/2023 at 12:04 PM
Physical Therapy  Facility/Department: [facility] CARE  Physical Therapy  NAME: Amando Jacinto  : 1980 (36 y.o.)  MRN: [id number]  CODE STATUS: Full Code    Date of Service: 23      Past Medical History:   Diagnosis Date    Alcohol abuse     Recurrent episodes of withdrawal    Anxiety     Arthritis     Painting esophagus     Benzodiazepine overdose 2015    Bipolar disorder, unspecified ([address]     Bipolar I disorder, most recent episode depressed, severe without psychotic features ([address]     Cellulitis 2018    Chronic abdominal wound infection 2015    Constipation 2019    Depression 2015    Drug overdose, intentional ([address] 2015    Genital herpes, unspecified     GERD (gastroesophageal reflux disease)     History of pulmonary embolism     Hx of blood clots dx 2 years ago    clots in both legs and lungs     Hypertension     Iron deficiency anemia     Isopropyl alcohol poisoning 2014    Lumbosacral spondylosis without myelopathy     MDRO (multiple drug resistant organisms) resistance 2010    MRSA (abd)    Miscarriage     multiple, around 7th mos pregnant each time d/t hypercoagulation    Morbid obesity ([address]     MRSA (methicillin resistant staph aureus) culture positive 02/10/2017    urine    Overdose of benzodiazepine     Pernicious anemia     Primary hypercoagulable state ([address]     antiphospholipid antibodies on Arixtra shots daily    Pulmonary embolism ([address] 2010    Suicidal behavior 2015    Suicidal ideation     Suicide attempt ([address] 2014    hx OD on painkillers and rubbing alcohol    SVT (supraventricular tachycardia) ([address] 2017    Toxic effect of ethanol, intentional self-harm ([address] 2015    Type 2 diabetes mellitus, with long-term current use of insulin [facility])      Past Surgical History:   Procedure Laterality Date    ABDOMINAL HERNIA REPAIR  2014    wound vac    ABDOMINAL HERNIA REPAIR  11/3/14    BARIATRIC SURGERY  2013    [facility]
Pt isn't willing to sign herself into I. She will need to be pink slipped. I not called d/t this reason.
Received call from telesitter patient trying to exit bed. Entered room and patient's feet were on the floor. Very difficult to redirect.
Report given to [facility] in [facility]. Patient transported to [facility] via w/c.
SPEECH LANGUAGE PATHOLOGY  Speech Language Pathology  [facility] PROGRESSIVE CARE    Dysphagia Treatment Note    Date: 5/10/2023  Patients Name: Russel Vegas  MRN: [id number]  Diagnosis: Dysphagia  Patient Active Problem List   Diagnosis Code    Pernicious anemia D51.0    History of ulcer disease Z87.898    History of DVT of lower extremity Z86.718    Primary hypercoagulable state [facility]) D68.59    Amputation finger S68.119A    GERD (gastroesophageal reflux disease) K21.9    Alcohol dependence in remission ([facility] F10.21    Type 2 diabetes mellitus with diabetic polyneuropathy, with long-term current use of insulin (HCC) E11.42, Z79.4    Depression with suicidal ideation F32. A, R45.851    Primary insomnia F51.01    Essential hypertension I10    Bipolar affective disorder in remission ([facility] F31.70    History of pulmonary embolism Z86.711    Antiphospholipid syndrome (HCC) D68.61    Alcohol intoxication ([facility] F10.929    Hypertriglyceridemia E78.1    Chronic post-traumatic stress disorder (PTSD) F43.12    OCD (obsessive compulsive disorder) F42.9    Severe benzodiazepine use disorder (HCC) F13.20    Obesity, Class III, BMI 40-49.9 (morbid obesity) (HCC) E66.01    History of MRSA infection Z86.14    Pain of upper abdomen R10.10    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    NAFLD (nonalcoholic fatty liver disease) K76.0    Nondependent opioid abuse in remission ([facility] F11.11    Osteopenia M85.80    History of Awa-en-Y gastric bypass Z98.84    Acute cystitis with hematuria N30.01    Herpes genitalis A60.00    History of drug abuse (HCC) F19.11    Malabsorption K90.9    History of pulmonary embolus (PE) Z86.711    Vitamin B 12 deficiency E53.8    Vitamin D deficiency E55.9    History of surgery of liver Z98.890    Blood alkaline phosphatase increased compared with prior measurement R74.8    Paroxysmal supraventricular tachycardia (HCC) I47.1    Anxiety F41.9    Hypomagnesemia E83.42    Chronic idiopathic constipation K59.04    Recurrent
SPEECH LANGUAGE PATHOLOGY  Speech Language Pathology  [facility] PROGRESSIVE CARE    Dysphagia Treatment Note    Date: 5/9/2023  Patients Name: Rosalie Madrigal  MRN: [id number]  Diagnosis: Dysphagia  Patient Active Problem List   Diagnosis Code    Pernicious anemia D51.0    History of ulcer disease Z87.898    History of DVT of lower extremity Z86.718    Primary hypercoagulable state [facility]) D68.59    Amputation finger S68.119A    GERD (gastroesophageal reflux disease) K21.9    Alcohol dependence in remission ([facility] F10.21    Type 2 diabetes mellitus with diabetic polyneuropathy, with long-term current use of insulin (HCC) E11.42, Z79.4    Depression with suicidal ideation F32. A, R45.851    Primary insomnia F51.01    Essential hypertension I10    Bipolar affective disorder in remission ([facility] F31.70    History of pulmonary embolism Z86.711    Antiphospholipid syndrome ([facility]) D68.61    Alcohol intoxication ([email or web address] F10.929    Hypertriglyceridemia E78.1    Chronic post-traumatic stress disorder (PTSD) F43.12    OCD (obsessive compulsive disorder) F42.9    Severe benzodiazepine use disorder (HCC) F13.20    Obesity, Class III, BMI 40-49.9 (morbid obesity) ([facility]) E66.01    History of MRSA infection Z86.14    Pain of upper abdomen R10.10    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    NAFLD (nonalcoholic fatty liver disease) K76.0    Nondependent opioid abuse in remission ([facility] F11.11    Osteopenia M85.80    History of Awa-en-Y gastric bypass Z98.84    Acute cystitis with hematuria N30.01    Herpes genitalis A60.00    History of drug abuse (HCC) F19.11    Malabsorption K90.9    History of pulmonary embolus (PE) Z86.711    Vitamin B 12 deficiency E53.8    Vitamin D deficiency E55.9    History of surgery of liver Z98.890    Blood alkaline phosphatase increased compared with prior measurement R74.8    Paroxysmal supraventricular tachycardia (HCC) I47.1    Anxiety F41.9    Hypomagnesemia E83.42    Chronic idiopathic constipation K59.04    Recurrent
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 A, R45.851    Primary insomnia F51.01    Essential hypertension I10    Bipolar affective disorder in remission ([facility] F31.70    History of pulmonary embolism Z86.711    Antiphospholipid syndrome (HCC) D68.61    Alcohol intoxication ([facility] F10.929    Hypertriglyceridemia E78.1    Chronic post-traumatic stress disorder (PTSD) F43.12    OCD (obsessive compulsive disorder) F42.9    Severe benzodiazepine use disorder (HCC) F13.20    Obesity, Class III, BMI 40-49.9 (morbid obesity) ([facility]) E66.01    History of MRSA infection Z86.14    Pain of upper abdomen R10.10    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    NAFLD (nonalcoholic fatty liver disease) K76.0    Nondependent opioid abuse in remission ([facility] F11.11    Osteopenia M85.80    History of Awa-en-Y gastric bypass Z98.84    Acute cystitis with hematuria N30.01    Herpes genitalis A60.00    History of drug abuse ([facility]) F19.11    Malabsorption K90.9    History of pulmonary embolus (PE) Z86.711    Vitamin B 12 deficiency E53.8    Vitamin D deficiency E55.9    History of surgery of liver Z98.890    Blood alkaline phosphatase increased compared with prior measurement R74.8    Paroxysmal supraventricular tachycardia (HCC) I47.1    Anxiety F41.9    Hypomagnesemia E83.42    Chronic idiopathic constipation K59.04    Recurrent
Telesitter placed in room due to report patient gets ouf of bed without before bed alarm is able to sound.
delusions. Perceptual Disturbance: Does not acknowledge presence of auditory or visual hallucinations. Patient does not appear to be responding to internal stimuli. Cognition: Unable to assess, patient minimally cooperative during interview  Memory: Unable to assess, patient minimally cooperative during interview  Insight: poor   Judgement: poor       Data   height is 5' 8\" (1.727 m) and weight is 282 lb 10.1 oz (128.2 kg). Her axillary temperature is 97.6 °F (36.4 °C). Her blood pressure is 112/64 and her pulse is 75. Her respiration is 18 and oxygen saturation is 100%. Labs:   No results displayed because visit has over 200 results. Reviewed patient's current plan of care and vital signs.     Labs reviewed: [x] Yes    Medications  Current Facility-Administered Medications: sodium chloride flush 0.9 % injection 10 mL, 10 mL, IntraVENous, PRN  antioxidant multivitamin (OCUVITE) tablet, 1 tablet, Oral, Daily  zinc oxide 40 % paste, , Topical, 4x Daily PRN  vitamin D (ERGOCALCIFEROL) capsule 50,000 Units, 50,000 Units, Oral, Weekly  folic acid (FOLVITE) tablet 1 mg, 1 mg, Oral, Daily  thiamine mononitrate tablet 100 mg, 100 mg, Oral, Daily  [Held by provider] chlordiazePOXIDE (LIBRIUM) capsule 25 mg, 25 mg, Oral, BID  metoprolol tartrate (LOPRESSOR) tablet 50 mg, 50 mg, Orogastric, BID  insulin glargine (LANTUS) injection vial 15 Units, 15 Units, SubCUTAneous, Daily  sodium chloride flush 0.9 % injection 5-40 mL, 5-40 mL, IntraVENous, 2 times per day  sodium chloride flush 0.9 % injection 5-40 mL, 5-40 mL, IntraVENous, PRN  0.9 % sodium chloride infusion, , IntraVENous, PRN  0.9 % sodium chloride infusion, , IntraVENous, Continuous  potassium chloride (KLOR-CON M) extended release tablet 40 mEq, 40 mEq, Oral, PRN **OR** potassium bicarb-citric acid (EFFER-K) effervescent tablet 40 mEq, 40 mEq, Oral, PRN **OR** potassium chloride 10 mEq/100 mL IVPB (Peripheral Line), 10 mEq, IntraVENous, PRN  magnesium
recall of precautions  Safety Judgement: Decreased awareness of need for assistance;Decreased awareness of need for safety  Problem Solving: Decreased awareness of errors  Insights: Decreased awareness of deficits  Initiation: Requires cues for all  Sequencing: Requires cues for all     Objective   Vitals     Bed Mobility Training  Bed Mobility Training: Yes  Overall Level of Assistance: Supervision  Rolling: Stand-by assistance (to L)  Supine to Sit: Stand-by assistance  Sit to Supine:  (did not assess. left sitting in bedside chair)  Scooting: Stand-by assistance (to EOB)  Balance  Sitting: With support (2 UE on bed)  Standing: With support (RW, CGA)  Transfer Training  Transfer Training: Yes  Overall Level of Assistance: Stand-by assistance;Assist X2  Interventions: Verbal cues; Safety awareness training  Sit to Stand: Stand-by assistance;Minimum assistance (Impulsively stand initially with no device but additional stands with RW. SBA to stand from EOB, minAx1 to stand from chair)  Stand to Sit: Contact-guard assistance (VCs for safe hand placement and alignment to chair)  Stand Pivot Transfers: Contact-guard assistance;Minimum assistance  Bed to Chair: Contact-guard assistance;Assist X2 (2A for safety concerns)  Gait Training  Gait Training: Yes  Gait  Overall Level of Assistance: Contact-guard assistance;Minimum assistance;Assist X2 (Minx1 and CGAx1 for with turning for safety)  Interventions: Verbal cues; Safety awareness training  Base of Support: Widened  Speed/Ami: Slow  Step Length: Left shortened;Right shortened  Swing Pattern: Right asymmetrical;Left asymmetrical  Gait Abnormalities: Decreased step clearance; Path deviations  Distance (ft):  (7' x 2)  Assistive Device: Walker, rolling     PT Exercises  A/AROM Exercises: (B) LE seated ex x 10 AAROM as needed with LAQs  Static Sitting Balance Exercises: Standing tolerance at RW 1 x 2'  CGA  Dynamic Standing Balance Exercises: Standing at [address] [place] [address], [facility]
stimuli;Inconsistent responses to stimuli  Following Commands: Follows one step commands with increased time; Follows one step commands with repetition  Attention Span: Attends with cues to redirect  Memory: Decreased recall of recent events;Decreased short term memory;Decreased recall of precautions  Safety Judgement: Decreased awareness of need for assistance;Decreased awareness of need for safety  Problem Solving: Decreased awareness of errors  Insights: Decreased awareness of deficits  Initiation: Requires cues for all  Sequencing: Requires cues for all     Objective   Vitals     Bed Mobility Training  Bed Mobility Training: Yes  Overall Level of Assistance: Supervision  Interventions: Verbal cues; Tactile cues; Visual cues  Rolling: Stand-by assistance  Supine to Sit: Stand-by assistance;Maximum assistance;Assist X2;Additional time; Adaptive equipment (pt progressed to CGA x1)  Sit to Supine: Moderate assistance (Assist with bilat LE into bed)  Scooting: Stand-by assistance (to EOB)  Balance  Sitting: With support (2 UE on bed)  Standing: With support (RW, CGA)  Transfer Training  Transfer Training: Yes  Overall Level of Assistance: Stand-by assistance;Assist X2  Interventions: Verbal cues; Safety awareness training  Sit to Stand: Minimum assistance;Assist X2 (pt eager to stand, Ambika Mendoza progressed to CGA x1 from low toilet)  Stand to Sit: Contact-guard assistance (VCs for safe hand placement and alignment to chair)  Stand Pivot Transfers: Minimum assistance;Assist X2  Bed to Chair: Contact-guard assistance;Assist X2 (assisting with turning walker and cuing for safety)  Gait Training  Gait Training: Yes  Gait  Overall Level of Assistance: Contact-guard assistance;Minimum assistance;Assist X2 (Assisting pt with moving RW, pt runs walker into doorways and objects.)  Interventions: Verbal cues; Safety awareness training  Base of Support: Narrowed  Speed/Ami: Slow  Step Length: Left shortened;Right shortened  Swing
intoxication with alcoholism, uncomplicated ([facility]    Alcohol withdrawal syndrome with complication [facility])    Accident due to mechanical fall without injury    Thoracic back pain    Floaters in visual field, right    Hyperglycemia    Bipolar 1 disorder, mixed, severe ([facility]    DKA, type 2, not at goal [facility])    Hypocalcemia    Hypokalemia    Troponin level elevated    Diabetic acidosis without coma (HCC)    Arrhythmia    Chest pain    Mild malnutrition (HCC)    Encephalopathy    Alcohol withdrawal syndrome without complication ([facility]    [facility]    Severe hypoxic-ischemic encephalopathy        PLAN    At this time we will hold off on adjusting psychiatric medications until she is admitted inpatient to [facility]. Patient is unable to contract for safety outside the hospital and requires inpatient psychiatric admission once medically stable. Additional recommendations will follow the clinical course. Thank you very much for allowing us to participate in the care of this patient. Electronically signed by Ronnie Marquez MD on 5/10/2023 at 7:12 PM     **This report has been created using voice recognition software. It may contain minor errors which are inherent in voice recognition technology. **
with complication [facility])     Accident due to mechanical fall without injury     Thoracic back pain     Floaters in visual field, right     Hyperglycemia     Bipolar 1 disorder, mixed, severe ([facility]     DKA, type 2, not at goal [facility])     Hypocalcemia     Hypokalemia     Troponin level elevated     Diabetic acidosis without coma ([facility]     Arrhythmia     Chest pain     Mild malnutrition (HCC)     Encephalopathy     Alcohol withdrawal syndrome without complication ([facility]     [facility]     Severe hypoxic-ischemic encephalopathy      Plan of Treatment:   Medications reviewed  Continue current dose of metoprolol 50 mg twice daily  She is also on Topamax, Cogentin, Pravachol, sliding scale insulin, Arixtra  Agree with current management  Psychiatric notes reviewed  Neurology notes reviewed    Electronically signed by Alba Navarro MD on 5/11/2023 at 11:25 AM
encounter have been performed by me . I agree with the assessment, plan and orders as documented by the resident. Patient with hypoxic ischemic encephalopathy  Plan discharge to [facility]  Patient had with pink MD MASON Dwyer [address], [address] [place] [address].    Phone [phone]   Fax: [phone]  Answering Service: [phone]
seen and examined the patient and the key elements of all parts of the encounter have been performed by me . I agree with the assessment, plan and orders as documented by the resident. Patient is hemodynamically more stable  Delirium secondary to ischemic hypoxic encephalopathy  S/p drug overdose  Discharge planning     MD MASON Cartwright [address], [address] [place] [address].    Phone [phone]   Fax: [phone]  Answering Service: [phone]

## 2023-05-12 ENCOUNTER — APPOINTMENT (OUTPATIENT)
Dept: GENERAL RADIOLOGY | Age: 43
DRG: 881 | End: 2023-05-12
Attending: PSYCHIATRY & NEUROLOGY
Payer: MEDICARE

## 2023-05-12 LAB
GLUCOSE BLD-MCNC: 248 MG/DL (ref 65–105)
GLUCOSE BLD-MCNC: 291 MG/DL (ref 65–105)

## 2023-05-12 PROCEDURE — 97110 THERAPEUTIC EXERCISES: CPT

## 2023-05-12 PROCEDURE — 6370000000 HC RX 637 (ALT 250 FOR IP): Performed by: PSYCHIATRY & NEUROLOGY

## 2023-05-12 PROCEDURE — 1240000000 HC EMOTIONAL WELLNESS R&B

## 2023-05-12 PROCEDURE — 99223 1ST HOSP IP/OBS HIGH 75: CPT | Performed by: NURSE PRACTITIONER

## 2023-05-12 PROCEDURE — 97530 THERAPEUTIC ACTIVITIES: CPT

## 2023-05-12 PROCEDURE — 6370000000 HC RX 637 (ALT 250 FOR IP)

## 2023-05-12 PROCEDURE — 6360000002 HC RX W HCPCS: Performed by: INTERNAL MEDICINE

## 2023-05-12 PROCEDURE — 92611 MOTION FLUOROSCOPY/SWALLOW: CPT

## 2023-05-12 PROCEDURE — 99223 1ST HOSP IP/OBS HIGH 75: CPT | Performed by: INTERNAL MEDICINE

## 2023-05-12 PROCEDURE — 74230 X-RAY XM SWLNG FUNCJ C+: CPT

## 2023-05-12 PROCEDURE — 6370000000 HC RX 637 (ALT 250 FOR IP): Performed by: INTERNAL MEDICINE

## 2023-05-12 PROCEDURE — 6370000000 HC RX 637 (ALT 250 FOR IP): Performed by: NURSE PRACTITIONER

## 2023-05-12 PROCEDURE — 97162 PT EVAL MOD COMPLEX 30 MIN: CPT

## 2023-05-12 PROCEDURE — 82947 ASSAY GLUCOSE BLOOD QUANT: CPT

## 2023-05-12 RX ORDER — PRAVASTATIN SODIUM 40 MG
40 TABLET ORAL NIGHTLY
Status: DISCONTINUED | OUTPATIENT
Start: 2023-05-12 | End: 2023-05-30 | Stop reason: HOSPADM

## 2023-05-12 RX ORDER — INSULIN LISPRO 100 [IU]/ML
0-4 INJECTION, SOLUTION INTRAVENOUS; SUBCUTANEOUS NIGHTLY
Status: DISCONTINUED | OUTPATIENT
Start: 2023-05-12 | End: 2023-05-30 | Stop reason: HOSPADM

## 2023-05-12 RX ORDER — GAUZE BANDAGE 2" X 2"
100 BANDAGE TOPICAL DAILY
Status: DISCONTINUED | OUTPATIENT
Start: 2023-05-12 | End: 2023-05-30 | Stop reason: HOSPADM

## 2023-05-12 RX ORDER — TOPIRAMATE 100 MG/1
100 TABLET, FILM COATED ORAL NIGHTLY
Status: DISCONTINUED | OUTPATIENT
Start: 2023-05-12 | End: 2023-05-30 | Stop reason: HOSPADM

## 2023-05-12 RX ORDER — FOLIC ACID 1 MG/1
1 TABLET ORAL DAILY
Status: DISCONTINUED | OUTPATIENT
Start: 2023-05-12 | End: 2023-05-30 | Stop reason: HOSPADM

## 2023-05-12 RX ORDER — FONDAPARINUX SODIUM 10 MG/.8ML
10 INJECTION SUBCUTANEOUS DAILY
Status: DISCONTINUED | OUTPATIENT
Start: 2023-05-12 | End: 2023-05-30 | Stop reason: HOSPADM

## 2023-05-12 RX ORDER — PANTOPRAZOLE SODIUM 40 MG/1
40 TABLET, DELAYED RELEASE ORAL DAILY PRN
Status: DISCONTINUED | OUTPATIENT
Start: 2023-05-12 | End: 2023-05-12

## 2023-05-12 RX ORDER — METOPROLOL TARTRATE 50 MG/1
50 TABLET, FILM COATED ORAL 2 TIMES DAILY
Status: DISCONTINUED | OUTPATIENT
Start: 2023-05-12 | End: 2023-05-17

## 2023-05-12 RX ORDER — DEXTROSE MONOHYDRATE 100 MG/ML
INJECTION, SOLUTION INTRAVENOUS CONTINUOUS PRN
Status: DISCONTINUED | OUTPATIENT
Start: 2023-05-12 | End: 2023-05-30 | Stop reason: HOSPADM

## 2023-05-12 RX ORDER — PANTOPRAZOLE SODIUM 40 MG/1
40 TABLET, DELAYED RELEASE ORAL DAILY PRN
Status: DISCONTINUED | OUTPATIENT
Start: 2023-05-12 | End: 2023-05-30 | Stop reason: HOSPADM

## 2023-05-12 RX ORDER — BENZTROPINE MESYLATE 1 MG/1
1 TABLET ORAL 2 TIMES DAILY
Status: DISCONTINUED | OUTPATIENT
Start: 2023-05-12 | End: 2023-05-30 | Stop reason: HOSPADM

## 2023-05-12 RX ORDER — INSULIN LISPRO 100 [IU]/ML
0-8 INJECTION, SOLUTION INTRAVENOUS; SUBCUTANEOUS EVERY 4 HOURS
Status: DISCONTINUED | OUTPATIENT
Start: 2023-05-12 | End: 2023-05-12

## 2023-05-12 RX ORDER — ERGOCALCIFEROL 1.25 MG/1
50000 CAPSULE ORAL WEEKLY
Status: DISCONTINUED | OUTPATIENT
Start: 2023-05-18 | End: 2023-05-30 | Stop reason: HOSPADM

## 2023-05-12 RX ORDER — OLANZAPINE 15 MG/1
15 TABLET ORAL NIGHTLY
Status: DISCONTINUED | OUTPATIENT
Start: 2023-05-12 | End: 2023-05-15

## 2023-05-12 RX ORDER — OLANZAPINE 15 MG/1
15 TABLET ORAL NIGHTLY
Status: DISCONTINUED | OUTPATIENT
Start: 2023-05-12 | End: 2023-05-12

## 2023-05-12 RX ORDER — FONDAPARINUX SODIUM 10 MG/.8ML
10 INJECTION SUBCUTANEOUS DAILY
Status: DISCONTINUED | OUTPATIENT
Start: 2023-05-12 | End: 2023-05-12

## 2023-05-12 RX ORDER — PRAVASTATIN SODIUM 40 MG
40 TABLET ORAL NIGHTLY
Status: DISCONTINUED | OUTPATIENT
Start: 2023-05-12 | End: 2023-05-12

## 2023-05-12 RX ORDER — INSULIN LISPRO 100 [IU]/ML
0-8 INJECTION, SOLUTION INTRAVENOUS; SUBCUTANEOUS
Status: DISCONTINUED | OUTPATIENT
Start: 2023-05-12 | End: 2023-05-30 | Stop reason: HOSPADM

## 2023-05-12 RX ORDER — FLUOXETINE 10 MG/1
10 CAPSULE ORAL DAILY
Status: DISCONTINUED | OUTPATIENT
Start: 2023-05-12 | End: 2023-05-18

## 2023-05-12 RX ORDER — INSULIN GLARGINE 100 [IU]/ML
15 INJECTION, SOLUTION SUBCUTANEOUS DAILY
Status: DISCONTINUED | OUTPATIENT
Start: 2023-05-12 | End: 2023-05-29

## 2023-05-12 RX ADMIN — FONDAPARINUX SODIUM 10 MG: 10 INJECTION, SOLUTION SUBCUTANEOUS at 20:58

## 2023-05-12 RX ADMIN — BENZTROPINE MESYLATE 1 MG: 1 TABLET ORAL at 20:57

## 2023-05-12 RX ADMIN — OLANZAPINE 15 MG: 15 TABLET, FILM COATED ORAL at 20:57

## 2023-05-12 RX ADMIN — PRAVASTATIN SODIUM 40 MG: 40 TABLET ORAL at 20:58

## 2023-05-12 RX ADMIN — METOPROLOL TARTRATE 50 MG: 50 TABLET, FILM COATED ORAL at 20:57

## 2023-05-12 RX ADMIN — TOPIRAMATE 100 MG: 100 TABLET, FILM COATED ORAL at 20:57

## 2023-05-12 RX ADMIN — INSULIN GLARGINE 15 UNITS: 100 INJECTION, SOLUTION SUBCUTANEOUS at 18:06

## 2023-05-12 RX ADMIN — FOLIC ACID 1 MG: 1 TABLET ORAL at 18:06

## 2023-05-12 RX ADMIN — EMPAGLIFLOZIN 10 MG: 10 TABLET, FILM COATED ORAL at 20:58

## 2023-05-12 RX ADMIN — THIAMINE HCL TAB 100 MG 100 MG: 100 TAB at 18:06

## 2023-05-12 RX ADMIN — FLUOXETINE 10 MG: 10 CAPSULE ORAL at 13:13

## 2023-05-12 RX ADMIN — TRAZODONE HYDROCHLORIDE 50 MG: 50 TABLET ORAL at 20:57

## 2023-05-13 LAB
GLUCOSE BLD-MCNC: 118 MG/DL (ref 65–105)
GLUCOSE BLD-MCNC: 150 MG/DL (ref 65–105)
GLUCOSE BLD-MCNC: 94 MG/DL (ref 65–105)
GLUCOSE BLD-MCNC: 98 MG/DL (ref 65–105)

## 2023-05-13 PROCEDURE — 99232 SBSQ HOSP IP/OBS MODERATE 35: CPT | Performed by: INTERNAL MEDICINE

## 2023-05-13 PROCEDURE — 99232 SBSQ HOSP IP/OBS MODERATE 35: CPT | Performed by: NURSE PRACTITIONER

## 2023-05-13 PROCEDURE — 6370000000 HC RX 637 (ALT 250 FOR IP): Performed by: NURSE PRACTITIONER

## 2023-05-13 PROCEDURE — 6370000000 HC RX 637 (ALT 250 FOR IP): Performed by: INTERNAL MEDICINE

## 2023-05-13 PROCEDURE — 6360000002 HC RX W HCPCS: Performed by: INTERNAL MEDICINE

## 2023-05-13 PROCEDURE — 2500000003 HC RX 250 WO HCPCS

## 2023-05-13 PROCEDURE — 6370000000 HC RX 637 (ALT 250 FOR IP)

## 2023-05-13 PROCEDURE — 6370000000 HC RX 637 (ALT 250 FOR IP): Performed by: PSYCHIATRY & NEUROLOGY

## 2023-05-13 PROCEDURE — 1240000000 HC EMOTIONAL WELLNESS R&B

## 2023-05-13 PROCEDURE — 82947 ASSAY GLUCOSE BLOOD QUANT: CPT

## 2023-05-13 RX ADMIN — FOLIC ACID 1 MG: 1 TABLET ORAL at 09:21

## 2023-05-13 RX ADMIN — TOPIRAMATE 100 MG: 100 TABLET, FILM COATED ORAL at 21:44

## 2023-05-13 RX ADMIN — TRAZODONE HYDROCHLORIDE 50 MG: 50 TABLET ORAL at 21:44

## 2023-05-13 RX ADMIN — BENZTROPINE MESYLATE 1 MG: 1 TABLET ORAL at 09:21

## 2023-05-13 RX ADMIN — INSULIN GLARGINE 15 UNITS: 100 INJECTION, SOLUTION SUBCUTANEOUS at 09:20

## 2023-05-13 RX ADMIN — OLANZAPINE 15 MG: 15 TABLET, FILM COATED ORAL at 21:44

## 2023-05-13 RX ADMIN — FONDAPARINUX SODIUM 10 MG: 10 INJECTION, SOLUTION SUBCUTANEOUS at 09:20

## 2023-05-13 RX ADMIN — EMPAGLIFLOZIN 10 MG: 10 TABLET, FILM COATED ORAL at 09:20

## 2023-05-13 RX ADMIN — BENZTROPINE MESYLATE 1 MG: 1 TABLET ORAL at 21:43

## 2023-05-13 RX ADMIN — FLUOXETINE 10 MG: 10 CAPSULE ORAL at 09:21

## 2023-05-13 RX ADMIN — METOPROLOL TARTRATE 50 MG: 50 TABLET, FILM COATED ORAL at 09:21

## 2023-05-13 RX ADMIN — THIAMINE HCL TAB 100 MG 100 MG: 100 TAB at 09:21

## 2023-05-13 RX ADMIN — ANTI-FUNGAL POWDER MICONAZOLE NITRATE TALC FREE: 1.42 POWDER TOPICAL at 19:11

## 2023-05-13 RX ADMIN — PRAVASTATIN SODIUM 40 MG: 40 TABLET ORAL at 21:44

## 2023-05-14 LAB
GLUCOSE BLD-MCNC: 143 MG/DL (ref 65–105)
GLUCOSE BLD-MCNC: 152 MG/DL (ref 65–105)
GLUCOSE BLD-MCNC: 235 MG/DL (ref 65–105)
GLUCOSE BLD-MCNC: 70 MG/DL (ref 65–105)
GLUCOSE BLD-MCNC: 76 MG/DL (ref 65–105)
GLUCOSE BLD-MCNC: 84 MG/DL (ref 65–105)

## 2023-05-14 PROCEDURE — 6370000000 HC RX 637 (ALT 250 FOR IP): Performed by: NURSE PRACTITIONER

## 2023-05-14 PROCEDURE — 6370000000 HC RX 637 (ALT 250 FOR IP)

## 2023-05-14 PROCEDURE — 99232 SBSQ HOSP IP/OBS MODERATE 35: CPT | Performed by: NURSE PRACTITIONER

## 2023-05-14 PROCEDURE — 6370000000 HC RX 637 (ALT 250 FOR IP): Performed by: PSYCHIATRY & NEUROLOGY

## 2023-05-14 PROCEDURE — 1240000000 HC EMOTIONAL WELLNESS R&B

## 2023-05-14 PROCEDURE — 6370000000 HC RX 637 (ALT 250 FOR IP): Performed by: INTERNAL MEDICINE

## 2023-05-14 PROCEDURE — 6360000002 HC RX W HCPCS: Performed by: INTERNAL MEDICINE

## 2023-05-14 PROCEDURE — 99232 SBSQ HOSP IP/OBS MODERATE 35: CPT | Performed by: INTERNAL MEDICINE

## 2023-05-14 RX ADMIN — BENZTROPINE MESYLATE 1 MG: 1 TABLET ORAL at 09:22

## 2023-05-14 RX ADMIN — THIAMINE HCL TAB 100 MG 100 MG: 100 TAB at 09:23

## 2023-05-14 RX ADMIN — FLUOXETINE 10 MG: 10 CAPSULE ORAL at 09:22

## 2023-05-14 RX ADMIN — FONDAPARINUX SODIUM 10 MG: 10 INJECTION, SOLUTION SUBCUTANEOUS at 09:23

## 2023-05-14 RX ADMIN — HYDROXYZINE HYDROCHLORIDE 50 MG: 50 TABLET, FILM COATED ORAL at 21:32

## 2023-05-14 RX ADMIN — TRAZODONE HYDROCHLORIDE 50 MG: 50 TABLET ORAL at 20:59

## 2023-05-14 RX ADMIN — ANTI-FUNGAL POWDER MICONAZOLE NITRATE TALC FREE: 1.42 POWDER TOPICAL at 09:29

## 2023-05-14 RX ADMIN — INSULIN GLARGINE 15 UNITS: 100 INJECTION, SOLUTION SUBCUTANEOUS at 09:24

## 2023-05-14 RX ADMIN — EMPAGLIFLOZIN 10 MG: 10 TABLET, FILM COATED ORAL at 09:23

## 2023-05-14 RX ADMIN — OLANZAPINE 15 MG: 15 TABLET, FILM COATED ORAL at 20:59

## 2023-05-14 RX ADMIN — METOPROLOL TARTRATE 50 MG: 50 TABLET, FILM COATED ORAL at 20:59

## 2023-05-14 RX ADMIN — TOPIRAMATE 100 MG: 100 TABLET, FILM COATED ORAL at 20:59

## 2023-05-14 RX ADMIN — ANTI-FUNGAL POWDER MICONAZOLE NITRATE TALC FREE: 1.42 POWDER TOPICAL at 21:13

## 2023-05-14 RX ADMIN — FOLIC ACID 1 MG: 1 TABLET ORAL at 09:22

## 2023-05-14 RX ADMIN — BENZTROPINE MESYLATE 1 MG: 1 TABLET ORAL at 20:59

## 2023-05-14 RX ADMIN — METOPROLOL TARTRATE 50 MG: 50 TABLET, FILM COATED ORAL at 09:23

## 2023-05-14 RX ADMIN — PRAVASTATIN SODIUM 40 MG: 40 TABLET ORAL at 21:13

## 2023-05-14 ASSESSMENT — PAIN SCALES - GENERAL: PAINLEVEL_OUTOF10: 0

## 2023-05-15 LAB
GLUCOSE BLD-MCNC: 126 MG/DL (ref 65–105)
GLUCOSE BLD-MCNC: 174 MG/DL (ref 65–105)
GLUCOSE BLD-MCNC: 247 MG/DL (ref 65–105)
GLUCOSE BLD-MCNC: 288 MG/DL (ref 65–105)

## 2023-05-15 PROCEDURE — 1240000000 HC EMOTIONAL WELLNESS R&B

## 2023-05-15 PROCEDURE — 97530 THERAPEUTIC ACTIVITIES: CPT

## 2023-05-15 PROCEDURE — 99232 SBSQ HOSP IP/OBS MODERATE 35: CPT

## 2023-05-15 PROCEDURE — 6370000000 HC RX 637 (ALT 250 FOR IP): Performed by: PSYCHIATRY & NEUROLOGY

## 2023-05-15 PROCEDURE — 6370000000 HC RX 637 (ALT 250 FOR IP): Performed by: NURSE PRACTITIONER

## 2023-05-15 PROCEDURE — 82947 ASSAY GLUCOSE BLOOD QUANT: CPT

## 2023-05-15 PROCEDURE — 90833 PSYTX W PT W E/M 30 MIN: CPT

## 2023-05-15 PROCEDURE — 97116 GAIT TRAINING THERAPY: CPT

## 2023-05-15 PROCEDURE — 6370000000 HC RX 637 (ALT 250 FOR IP)

## 2023-05-15 PROCEDURE — 97110 THERAPEUTIC EXERCISES: CPT

## 2023-05-15 PROCEDURE — 99232 SBSQ HOSP IP/OBS MODERATE 35: CPT | Performed by: INTERNAL MEDICINE

## 2023-05-15 PROCEDURE — 97166 OT EVAL MOD COMPLEX 45 MIN: CPT

## 2023-05-15 PROCEDURE — 6370000000 HC RX 637 (ALT 250 FOR IP): Performed by: INTERNAL MEDICINE

## 2023-05-15 PROCEDURE — 6360000002 HC RX W HCPCS: Performed by: INTERNAL MEDICINE

## 2023-05-15 RX ORDER — LIDOCAINE 4 G/G
1 PATCH TOPICAL DAILY
Status: DISCONTINUED | OUTPATIENT
Start: 2023-05-15 | End: 2023-05-19

## 2023-05-15 RX ORDER — OLANZAPINE 10 MG/1
20 TABLET ORAL NIGHTLY
Status: DISCONTINUED | OUTPATIENT
Start: 2023-05-15 | End: 2023-05-30 | Stop reason: HOSPADM

## 2023-05-15 RX ADMIN — BENZTROPINE MESYLATE 1 MG: 1 TABLET ORAL at 09:02

## 2023-05-15 RX ADMIN — OLANZAPINE 20 MG: 10 TABLET, FILM COATED ORAL at 21:12

## 2023-05-15 RX ADMIN — FONDAPARINUX SODIUM 10 MG: 10 INJECTION, SOLUTION SUBCUTANEOUS at 10:15

## 2023-05-15 RX ADMIN — ANTI-FUNGAL POWDER MICONAZOLE NITRATE TALC FREE: 1.42 POWDER TOPICAL at 09:05

## 2023-05-15 RX ADMIN — BENZTROPINE MESYLATE 1 MG: 1 TABLET ORAL at 21:11

## 2023-05-15 RX ADMIN — FOLIC ACID 1 MG: 1 TABLET ORAL at 09:02

## 2023-05-15 RX ADMIN — THIAMINE HCL TAB 100 MG 100 MG: 100 TAB at 09:02

## 2023-05-15 RX ADMIN — EMPAGLIFLOZIN 10 MG: 10 TABLET, FILM COATED ORAL at 10:16

## 2023-05-15 RX ADMIN — INSULIN GLARGINE 15 UNITS: 100 INJECTION, SOLUTION SUBCUTANEOUS at 09:02

## 2023-05-15 RX ADMIN — TOPIRAMATE 100 MG: 100 TABLET, FILM COATED ORAL at 21:12

## 2023-05-15 RX ADMIN — TRAZODONE HYDROCHLORIDE 50 MG: 50 TABLET ORAL at 21:12

## 2023-05-15 RX ADMIN — FLUOXETINE 10 MG: 10 CAPSULE ORAL at 09:02

## 2023-05-15 RX ADMIN — INSULIN LISPRO 4 UNITS: 100 INJECTION, SOLUTION INTRAVENOUS; SUBCUTANEOUS at 17:12

## 2023-05-15 RX ADMIN — HYDROXYZINE HYDROCHLORIDE 50 MG: 50 TABLET, FILM COATED ORAL at 21:11

## 2023-05-15 RX ADMIN — INSULIN LISPRO 2 UNITS: 100 INJECTION, SOLUTION INTRAVENOUS; SUBCUTANEOUS at 12:09

## 2023-05-15 ASSESSMENT — PAIN DESCRIPTION - ORIENTATION
ORIENTATION: MID;UPPER
ORIENTATION: MID;LOWER

## 2023-05-15 ASSESSMENT — PAIN DESCRIPTION - LOCATION
LOCATION: ABDOMEN;BACK
LOCATION: ABDOMEN

## 2023-05-15 ASSESSMENT — PAIN SCALES - GENERAL: PAINLEVEL_OUTOF10: 4

## 2023-05-15 ASSESSMENT — PAIN DESCRIPTION - ONSET: ONSET: AWAKENED FROM SLEEP

## 2023-05-15 ASSESSMENT — PAIN DESCRIPTION - DESCRIPTORS
DESCRIPTORS: DISCOMFORT
DESCRIPTORS: DISCOMFORT

## 2023-05-15 ASSESSMENT — PAIN - FUNCTIONAL ASSESSMENT: PAIN_FUNCTIONAL_ASSESSMENT: ACTIVITIES ARE NOT PREVENTED

## 2023-05-15 ASSESSMENT — PAIN DESCRIPTION - FREQUENCY: FREQUENCY: INTERMITTENT

## 2023-05-15 ASSESSMENT — PAIN DESCRIPTION - PAIN TYPE: TYPE: ACUTE PAIN

## 2023-05-16 LAB
ANION GAP SERPL CALCULATED.3IONS-SCNC: 9 MMOL/L (ref 9–17)
BASOPHILS # BLD: 0.1 K/UL (ref 0–0.2)
BASOPHILS # BLD: 1 % (ref 0–2)
BUN SERPL-MCNC: 8 MG/DL (ref 6–20)
CALCIUM SERPL-MCNC: 8.7 MG/DL (ref 8.6–10.4)
CHLORIDE SERPL-SCNC: 106 MMOL/L (ref 98–107)
CO2 SERPL-SCNC: 24 MMOL/L (ref 20–31)
CREAT SERPL-MCNC: 0.65 MG/DL (ref 0.5–0.9)
EOSINOPHIL # BLD: 0.1 K/UL (ref 0–0.4)
EOSINOPHILS RELATIVE PERCENT: 1 % (ref 0–4)
ERYTHROCYTE [DISTWIDTH] IN BLOOD BY AUTOMATED COUNT: 15.8 % (ref 11.5–14.9)
GFR SERPL CREATININE-BSD FRML MDRD: >60 ML/MIN/1.73M2
GLUCOSE BLD-MCNC: 118 MG/DL (ref 65–105)
GLUCOSE BLD-MCNC: 251 MG/DL (ref 65–105)
GLUCOSE BLD-MCNC: 97 MG/DL (ref 65–105)
GLUCOSE SERPL-MCNC: 164 MG/DL (ref 70–99)
HCT VFR BLD AUTO: 40.5 % (ref 36–46)
HGB BLD-MCNC: 12.9 G/DL (ref 12–16)
LYMPHOCYTES # BLD: 30 % (ref 24–44)
LYMPHOCYTES NFR BLD: 2.4 K/UL (ref 1–4.8)
MCH RBC QN AUTO: 31.7 PG (ref 26–34)
MCHC RBC AUTO-ENTMCNC: 31.9 G/DL (ref 31–37)
MCV RBC AUTO: 99.4 FL (ref 80–100)
MONOCYTES NFR BLD: 0.6 K/UL (ref 0.1–1.3)
MONOCYTES NFR BLD: 8 % (ref 1–7)
NEUTROPHILS NFR BLD: 60 % (ref 36–66)
NEUTS SEG NFR BLD: 5.1 K/UL (ref 1.3–9.1)
PLATELET # BLD AUTO: 394 K/UL (ref 150–450)
PMV BLD AUTO: 9.7 FL (ref 6–12)
POTASSIUM SERPL-SCNC: 4.2 MMOL/L (ref 3.7–5.3)
RBC # BLD AUTO: 4.08 M/UL (ref 4–5.2)
SODIUM SERPL-SCNC: 139 MMOL/L (ref 135–144)
WBC OTHER # BLD: 8.3 K/UL (ref 3.5–11)

## 2023-05-16 PROCEDURE — 6370000000 HC RX 637 (ALT 250 FOR IP): Performed by: NURSE PRACTITIONER

## 2023-05-16 PROCEDURE — 6370000000 HC RX 637 (ALT 250 FOR IP): Performed by: PSYCHIATRY & NEUROLOGY

## 2023-05-16 PROCEDURE — 85025 COMPLETE CBC W/AUTO DIFF WBC: CPT

## 2023-05-16 PROCEDURE — 99232 SBSQ HOSP IP/OBS MODERATE 35: CPT | Performed by: INTERNAL MEDICINE

## 2023-05-16 PROCEDURE — 6370000000 HC RX 637 (ALT 250 FOR IP)

## 2023-05-16 PROCEDURE — 90833 PSYTX W PT W E/M 30 MIN: CPT

## 2023-05-16 PROCEDURE — 80048 BASIC METABOLIC PNL TOTAL CA: CPT

## 2023-05-16 PROCEDURE — 1240000000 HC EMOTIONAL WELLNESS R&B

## 2023-05-16 PROCEDURE — 6360000002 HC RX W HCPCS: Performed by: INTERNAL MEDICINE

## 2023-05-16 PROCEDURE — 99232 SBSQ HOSP IP/OBS MODERATE 35: CPT

## 2023-05-16 PROCEDURE — 6370000000 HC RX 637 (ALT 250 FOR IP): Performed by: INTERNAL MEDICINE

## 2023-05-16 PROCEDURE — 82947 ASSAY GLUCOSE BLOOD QUANT: CPT

## 2023-05-16 PROCEDURE — 36415 COLL VENOUS BLD VENIPUNCTURE: CPT

## 2023-05-16 RX ADMIN — ANTI-FUNGAL POWDER MICONAZOLE NITRATE TALC FREE: 1.42 POWDER TOPICAL at 20:35

## 2023-05-16 RX ADMIN — TOPIRAMATE 100 MG: 100 TABLET, FILM COATED ORAL at 20:37

## 2023-05-16 RX ADMIN — FLUOXETINE 10 MG: 10 CAPSULE ORAL at 09:51

## 2023-05-16 RX ADMIN — PRAVASTATIN SODIUM 40 MG: 40 TABLET ORAL at 20:36

## 2023-05-16 RX ADMIN — TRAZODONE HYDROCHLORIDE 50 MG: 50 TABLET ORAL at 20:36

## 2023-05-16 RX ADMIN — BENZTROPINE MESYLATE 1 MG: 1 TABLET ORAL at 20:36

## 2023-05-16 RX ADMIN — EMPAGLIFLOZIN 10 MG: 10 TABLET, FILM COATED ORAL at 09:52

## 2023-05-16 RX ADMIN — THIAMINE HCL TAB 100 MG 100 MG: 100 TAB at 09:51

## 2023-05-16 RX ADMIN — HYDROXYZINE HYDROCHLORIDE 50 MG: 50 TABLET, FILM COATED ORAL at 20:37

## 2023-05-16 RX ADMIN — OLANZAPINE 20 MG: 10 TABLET, FILM COATED ORAL at 20:35

## 2023-05-16 RX ADMIN — BENZTROPINE MESYLATE 1 MG: 1 TABLET ORAL at 09:51

## 2023-05-16 RX ADMIN — FOLIC ACID 1 MG: 1 TABLET ORAL at 09:51

## 2023-05-16 RX ADMIN — INSULIN GLARGINE 15 UNITS: 100 INJECTION, SOLUTION SUBCUTANEOUS at 09:57

## 2023-05-16 RX ADMIN — FONDAPARINUX SODIUM 10 MG: 10 INJECTION, SOLUTION SUBCUTANEOUS at 09:52

## 2023-05-17 LAB
GLUCOSE BLD-MCNC: 108 MG/DL (ref 65–105)
GLUCOSE BLD-MCNC: 169 MG/DL (ref 65–105)
GLUCOSE BLD-MCNC: 181 MG/DL (ref 65–105)
GLUCOSE BLD-MCNC: 74 MG/DL (ref 65–105)

## 2023-05-17 PROCEDURE — 6370000000 HC RX 637 (ALT 250 FOR IP): Performed by: NURSE PRACTITIONER

## 2023-05-17 PROCEDURE — 99232 SBSQ HOSP IP/OBS MODERATE 35: CPT | Performed by: INTERNAL MEDICINE

## 2023-05-17 PROCEDURE — 1240000000 HC EMOTIONAL WELLNESS R&B

## 2023-05-17 PROCEDURE — 99222 1ST HOSP IP/OBS MODERATE 55: CPT | Performed by: PHYSICAL MEDICINE & REHABILITATION

## 2023-05-17 PROCEDURE — 6370000000 HC RX 637 (ALT 250 FOR IP)

## 2023-05-17 PROCEDURE — 6370000000 HC RX 637 (ALT 250 FOR IP): Performed by: PSYCHIATRY & NEUROLOGY

## 2023-05-17 PROCEDURE — 82947 ASSAY GLUCOSE BLOOD QUANT: CPT

## 2023-05-17 PROCEDURE — 90833 PSYTX W PT W E/M 30 MIN: CPT

## 2023-05-17 PROCEDURE — 6370000000 HC RX 637 (ALT 250 FOR IP): Performed by: INTERNAL MEDICINE

## 2023-05-17 PROCEDURE — 99232 SBSQ HOSP IP/OBS MODERATE 35: CPT

## 2023-05-17 PROCEDURE — 92523 SPEECH SOUND LANG COMPREHEN: CPT

## 2023-05-17 PROCEDURE — 6360000002 HC RX W HCPCS: Performed by: INTERNAL MEDICINE

## 2023-05-17 RX ORDER — METOPROLOL TARTRATE 50 MG/1
50 TABLET, FILM COATED ORAL 2 TIMES DAILY
Status: DISCONTINUED | OUTPATIENT
Start: 2023-05-17 | End: 2023-05-26

## 2023-05-17 RX ADMIN — THIAMINE HCL TAB 100 MG 100 MG: 100 TAB at 09:25

## 2023-05-17 RX ADMIN — FLUOXETINE 10 MG: 10 CAPSULE ORAL at 09:25

## 2023-05-17 RX ADMIN — FOLIC ACID 1 MG: 1 TABLET ORAL at 09:25

## 2023-05-17 RX ADMIN — BENZTROPINE MESYLATE 1 MG: 1 TABLET ORAL at 09:25

## 2023-05-17 RX ADMIN — TRAZODONE HYDROCHLORIDE 50 MG: 50 TABLET ORAL at 21:03

## 2023-05-17 RX ADMIN — INSULIN GLARGINE 15 UNITS: 100 INJECTION, SOLUTION SUBCUTANEOUS at 09:25

## 2023-05-17 RX ADMIN — HYDROXYZINE HYDROCHLORIDE 50 MG: 50 TABLET, FILM COATED ORAL at 21:03

## 2023-05-17 RX ADMIN — EMPAGLIFLOZIN 10 MG: 10 TABLET, FILM COATED ORAL at 09:34

## 2023-05-17 RX ADMIN — OLANZAPINE 20 MG: 10 TABLET, FILM COATED ORAL at 21:03

## 2023-05-17 RX ADMIN — PRAVASTATIN SODIUM 40 MG: 40 TABLET ORAL at 21:02

## 2023-05-17 RX ADMIN — ANTI-FUNGAL POWDER MICONAZOLE NITRATE TALC FREE: 1.42 POWDER TOPICAL at 21:02

## 2023-05-17 RX ADMIN — FONDAPARINUX SODIUM 10 MG: 10 INJECTION, SOLUTION SUBCUTANEOUS at 09:34

## 2023-05-17 RX ADMIN — METOPROLOL TARTRATE 50 MG: 50 TABLET, FILM COATED ORAL at 21:03

## 2023-05-17 RX ADMIN — TOPIRAMATE 100 MG: 100 TABLET, FILM COATED ORAL at 21:02

## 2023-05-17 RX ADMIN — BENZTROPINE MESYLATE 1 MG: 1 TABLET ORAL at 21:02

## 2023-05-17 ASSESSMENT — PAIN SCALES - GENERAL: PAINLEVEL_OUTOF10: 0

## 2023-05-18 PROBLEM — R41.89 COGNITIVE IMPAIRMENT: Status: ACTIVE | Noted: 2023-05-18

## 2023-05-18 LAB
GLUCOSE BLD-MCNC: 101 MG/DL (ref 65–105)
GLUCOSE BLD-MCNC: 120 MG/DL (ref 65–105)
GLUCOSE BLD-MCNC: 177 MG/DL (ref 65–105)
GLUCOSE BLD-MCNC: 223 MG/DL (ref 65–105)
GLUCOSE BLD-MCNC: 67 MG/DL (ref 65–105)

## 2023-05-18 PROCEDURE — 6370000000 HC RX 637 (ALT 250 FOR IP)

## 2023-05-18 PROCEDURE — 97129 THER IVNTJ 1ST 15 MIN: CPT

## 2023-05-18 PROCEDURE — 99232 SBSQ HOSP IP/OBS MODERATE 35: CPT | Performed by: INTERNAL MEDICINE

## 2023-05-18 PROCEDURE — 6370000000 HC RX 637 (ALT 250 FOR IP): Performed by: NURSE PRACTITIONER

## 2023-05-18 PROCEDURE — 92507 TX SP LANG VOICE COMM INDIV: CPT

## 2023-05-18 PROCEDURE — 1240000000 HC EMOTIONAL WELLNESS R&B

## 2023-05-18 PROCEDURE — 99232 SBSQ HOSP IP/OBS MODERATE 35: CPT

## 2023-05-18 PROCEDURE — 6360000002 HC RX W HCPCS: Performed by: INTERNAL MEDICINE

## 2023-05-18 PROCEDURE — 6370000000 HC RX 637 (ALT 250 FOR IP): Performed by: PSYCHIATRY & NEUROLOGY

## 2023-05-18 PROCEDURE — 99221 1ST HOSP IP/OBS SF/LOW 40: CPT | Performed by: PSYCHIATRY & NEUROLOGY

## 2023-05-18 PROCEDURE — 6370000000 HC RX 637 (ALT 250 FOR IP): Performed by: INTERNAL MEDICINE

## 2023-05-18 PROCEDURE — 99232 SBSQ HOSP IP/OBS MODERATE 35: CPT | Performed by: PHYSICAL MEDICINE & REHABILITATION

## 2023-05-18 PROCEDURE — 82947 ASSAY GLUCOSE BLOOD QUANT: CPT

## 2023-05-18 RX ORDER — FLUOXETINE HYDROCHLORIDE 20 MG/1
20 CAPSULE ORAL DAILY
Status: DISCONTINUED | OUTPATIENT
Start: 2023-05-19 | End: 2023-05-30 | Stop reason: HOSPADM

## 2023-05-18 RX ADMIN — PRAVASTATIN SODIUM 40 MG: 40 TABLET ORAL at 20:35

## 2023-05-18 RX ADMIN — EMPAGLIFLOZIN 10 MG: 10 TABLET, FILM COATED ORAL at 08:49

## 2023-05-18 RX ADMIN — ERGOCALCIFEROL 50000 UNITS: 1.25 CAPSULE ORAL at 08:49

## 2023-05-18 RX ADMIN — OLANZAPINE 20 MG: 10 TABLET, FILM COATED ORAL at 20:35

## 2023-05-18 RX ADMIN — FOLIC ACID 1 MG: 1 TABLET ORAL at 08:49

## 2023-05-18 RX ADMIN — FONDAPARINUX SODIUM 10 MG: 10 INJECTION, SOLUTION SUBCUTANEOUS at 08:49

## 2023-05-18 RX ADMIN — HYDROXYZINE HYDROCHLORIDE 50 MG: 50 TABLET, FILM COATED ORAL at 20:35

## 2023-05-18 RX ADMIN — ANTI-FUNGAL POWDER MICONAZOLE NITRATE TALC FREE: 1.42 POWDER TOPICAL at 08:52

## 2023-05-18 RX ADMIN — BENZTROPINE MESYLATE 1 MG: 1 TABLET ORAL at 08:49

## 2023-05-18 RX ADMIN — TRAZODONE HYDROCHLORIDE 50 MG: 50 TABLET ORAL at 20:35

## 2023-05-18 RX ADMIN — METOPROLOL TARTRATE 50 MG: 50 TABLET, FILM COATED ORAL at 08:49

## 2023-05-18 RX ADMIN — THIAMINE HCL TAB 100 MG 100 MG: 100 TAB at 08:49

## 2023-05-18 RX ADMIN — TOPIRAMATE 100 MG: 100 TABLET, FILM COATED ORAL at 20:35

## 2023-05-18 RX ADMIN — INSULIN GLARGINE 15 UNITS: 100 INJECTION, SOLUTION SUBCUTANEOUS at 08:48

## 2023-05-18 RX ADMIN — BENZTROPINE MESYLATE 1 MG: 1 TABLET ORAL at 20:35

## 2023-05-18 RX ADMIN — FLUOXETINE 10 MG: 10 CAPSULE ORAL at 08:49

## 2023-05-18 RX ADMIN — METOPROLOL TARTRATE 50 MG: 50 TABLET, FILM COATED ORAL at 20:35

## 2023-05-19 LAB
GLUCOSE BLD-MCNC: 103 MG/DL (ref 65–105)
GLUCOSE BLD-MCNC: 207 MG/DL (ref 65–105)

## 2023-05-19 PROCEDURE — 6370000000 HC RX 637 (ALT 250 FOR IP)

## 2023-05-19 PROCEDURE — 1240000000 HC EMOTIONAL WELLNESS R&B

## 2023-05-19 PROCEDURE — 97110 THERAPEUTIC EXERCISES: CPT

## 2023-05-19 PROCEDURE — 6370000000 HC RX 637 (ALT 250 FOR IP): Performed by: PSYCHIATRY & NEUROLOGY

## 2023-05-19 PROCEDURE — 6370000000 HC RX 637 (ALT 250 FOR IP): Performed by: INTERNAL MEDICINE

## 2023-05-19 PROCEDURE — 99232 SBSQ HOSP IP/OBS MODERATE 35: CPT | Performed by: PSYCHIATRY & NEUROLOGY

## 2023-05-19 PROCEDURE — 97535 SELF CARE MNGMENT TRAINING: CPT

## 2023-05-19 PROCEDURE — 82947 ASSAY GLUCOSE BLOOD QUANT: CPT

## 2023-05-19 PROCEDURE — 97116 GAIT TRAINING THERAPY: CPT

## 2023-05-19 PROCEDURE — 99231 SBSQ HOSP IP/OBS SF/LOW 25: CPT | Performed by: INTERNAL MEDICINE

## 2023-05-19 PROCEDURE — 6360000002 HC RX W HCPCS: Performed by: INTERNAL MEDICINE

## 2023-05-19 RX ORDER — TRAZODONE HYDROCHLORIDE 50 MG/1
25 TABLET ORAL NIGHTLY PRN
Status: DISCONTINUED | OUTPATIENT
Start: 2023-05-19 | End: 2023-05-24

## 2023-05-19 RX ADMIN — FLUOXETINE 20 MG: 20 CAPSULE ORAL at 08:55

## 2023-05-19 RX ADMIN — FONDAPARINUX SODIUM 10 MG: 10 INJECTION, SOLUTION SUBCUTANEOUS at 08:51

## 2023-05-19 RX ADMIN — METOPROLOL TARTRATE 50 MG: 50 TABLET, FILM COATED ORAL at 21:28

## 2023-05-19 RX ADMIN — BENZTROPINE MESYLATE 1 MG: 1 TABLET ORAL at 21:29

## 2023-05-19 RX ADMIN — EMPAGLIFLOZIN 10 MG: 10 TABLET, FILM COATED ORAL at 08:55

## 2023-05-19 RX ADMIN — TOPIRAMATE 100 MG: 100 TABLET, FILM COATED ORAL at 21:29

## 2023-05-19 RX ADMIN — THIAMINE HCL TAB 100 MG 100 MG: 100 TAB at 08:54

## 2023-05-19 RX ADMIN — OLANZAPINE 20 MG: 10 TABLET, FILM COATED ORAL at 21:29

## 2023-05-19 RX ADMIN — PRAVASTATIN SODIUM 40 MG: 40 TABLET ORAL at 21:28

## 2023-05-19 RX ADMIN — BENZTROPINE MESYLATE 1 MG: 1 TABLET ORAL at 08:55

## 2023-05-19 RX ADMIN — METOPROLOL TARTRATE 50 MG: 50 TABLET, FILM COATED ORAL at 08:54

## 2023-05-19 RX ADMIN — TRAZODONE HYDROCHLORIDE 25 MG: 50 TABLET ORAL at 21:28

## 2023-05-19 RX ADMIN — INSULIN GLARGINE 15 UNITS: 100 INJECTION, SOLUTION SUBCUTANEOUS at 08:53

## 2023-05-19 RX ADMIN — FOLIC ACID 1 MG: 1 TABLET ORAL at 08:54

## 2023-05-19 ASSESSMENT — PAIN - FUNCTIONAL ASSESSMENT: PAIN_FUNCTIONAL_ASSESSMENT: NONE - DENIES PAIN

## 2023-05-20 LAB
GLUCOSE BLD-MCNC: 124 MG/DL (ref 65–105)
GLUCOSE BLD-MCNC: 150 MG/DL (ref 65–105)
GLUCOSE BLD-MCNC: 153 MG/DL (ref 65–105)
GLUCOSE BLD-MCNC: 356 MG/DL (ref 65–105)

## 2023-05-20 PROCEDURE — 6370000000 HC RX 637 (ALT 250 FOR IP)

## 2023-05-20 PROCEDURE — 82947 ASSAY GLUCOSE BLOOD QUANT: CPT

## 2023-05-20 PROCEDURE — 6370000000 HC RX 637 (ALT 250 FOR IP): Performed by: INTERNAL MEDICINE

## 2023-05-20 PROCEDURE — 99232 SBSQ HOSP IP/OBS MODERATE 35: CPT | Performed by: PSYCHIATRY & NEUROLOGY

## 2023-05-20 PROCEDURE — 6360000002 HC RX W HCPCS: Performed by: INTERNAL MEDICINE

## 2023-05-20 PROCEDURE — 1240000000 HC EMOTIONAL WELLNESS R&B

## 2023-05-20 PROCEDURE — 6370000000 HC RX 637 (ALT 250 FOR IP): Performed by: PSYCHIATRY & NEUROLOGY

## 2023-05-20 RX ADMIN — HYDROXYZINE HYDROCHLORIDE 50 MG: 50 TABLET, FILM COATED ORAL at 21:29

## 2023-05-20 RX ADMIN — INSULIN GLARGINE 15 UNITS: 100 INJECTION, SOLUTION SUBCUTANEOUS at 08:13

## 2023-05-20 RX ADMIN — TOPIRAMATE 100 MG: 100 TABLET, FILM COATED ORAL at 21:29

## 2023-05-20 RX ADMIN — FOLIC ACID 1 MG: 1 TABLET ORAL at 08:13

## 2023-05-20 RX ADMIN — OLANZAPINE 20 MG: 10 TABLET, FILM COATED ORAL at 21:29

## 2023-05-20 RX ADMIN — TRAZODONE HYDROCHLORIDE 25 MG: 50 TABLET ORAL at 21:29

## 2023-05-20 RX ADMIN — PRAVASTATIN SODIUM 40 MG: 40 TABLET ORAL at 21:29

## 2023-05-20 RX ADMIN — FONDAPARINUX SODIUM 10 MG: 10 INJECTION, SOLUTION SUBCUTANEOUS at 08:13

## 2023-05-20 RX ADMIN — THIAMINE HCL TAB 100 MG 100 MG: 100 TAB at 08:13

## 2023-05-20 RX ADMIN — EMPAGLIFLOZIN 10 MG: 10 TABLET, FILM COATED ORAL at 08:16

## 2023-05-20 RX ADMIN — FLUOXETINE 20 MG: 20 CAPSULE ORAL at 08:13

## 2023-05-20 RX ADMIN — INSULIN LISPRO 8 UNITS: 100 INJECTION, SOLUTION INTRAVENOUS; SUBCUTANEOUS at 17:15

## 2023-05-20 RX ADMIN — BENZTROPINE MESYLATE 1 MG: 1 TABLET ORAL at 21:29

## 2023-05-20 RX ADMIN — BENZTROPINE MESYLATE 1 MG: 1 TABLET ORAL at 08:13

## 2023-05-20 ASSESSMENT — LIFESTYLE VARIABLES: HOW OFTEN DO YOU HAVE A DRINK CONTAINING ALCOHOL: NEVER

## 2023-05-21 LAB
GLUCOSE BLD-MCNC: 170 MG/DL (ref 65–105)
GLUCOSE BLD-MCNC: 255 MG/DL (ref 65–105)
GLUCOSE BLD-MCNC: 73 MG/DL (ref 65–105)
GLUCOSE BLD-MCNC: 75 MG/DL (ref 65–105)

## 2023-05-21 PROCEDURE — 99232 SBSQ HOSP IP/OBS MODERATE 35: CPT | Performed by: PSYCHIATRY & NEUROLOGY

## 2023-05-21 PROCEDURE — 6370000000 HC RX 637 (ALT 250 FOR IP): Performed by: INTERNAL MEDICINE

## 2023-05-21 PROCEDURE — 97129 THER IVNTJ 1ST 15 MIN: CPT

## 2023-05-21 PROCEDURE — 6370000000 HC RX 637 (ALT 250 FOR IP)

## 2023-05-21 PROCEDURE — 6360000002 HC RX W HCPCS: Performed by: INTERNAL MEDICINE

## 2023-05-21 PROCEDURE — 1240000000 HC EMOTIONAL WELLNESS R&B

## 2023-05-21 PROCEDURE — 82947 ASSAY GLUCOSE BLOOD QUANT: CPT

## 2023-05-21 PROCEDURE — 97130 THER IVNTJ EA ADDL 15 MIN: CPT

## 2023-05-21 RX ADMIN — FONDAPARINUX SODIUM 10 MG: 10 INJECTION, SOLUTION SUBCUTANEOUS at 09:50

## 2023-05-21 RX ADMIN — TOPIRAMATE 100 MG: 100 TABLET, FILM COATED ORAL at 21:09

## 2023-05-21 RX ADMIN — FOLIC ACID 1 MG: 1 TABLET ORAL at 09:50

## 2023-05-21 RX ADMIN — BENZTROPINE MESYLATE 1 MG: 1 TABLET ORAL at 21:09

## 2023-05-21 RX ADMIN — INSULIN LISPRO 4 UNITS: 100 INJECTION, SOLUTION INTRAVENOUS; SUBCUTANEOUS at 17:39

## 2023-05-21 RX ADMIN — FLUOXETINE 20 MG: 20 CAPSULE ORAL at 09:50

## 2023-05-21 RX ADMIN — BENZTROPINE MESYLATE 1 MG: 1 TABLET ORAL at 09:50

## 2023-05-21 RX ADMIN — PRAVASTATIN SODIUM 40 MG: 40 TABLET ORAL at 21:09

## 2023-05-21 RX ADMIN — OLANZAPINE 20 MG: 10 TABLET, FILM COATED ORAL at 21:09

## 2023-05-21 RX ADMIN — THIAMINE HCL TAB 100 MG 100 MG: 100 TAB at 09:50

## 2023-05-21 RX ADMIN — EMPAGLIFLOZIN 10 MG: 10 TABLET, FILM COATED ORAL at 09:50

## 2023-05-21 RX ADMIN — INSULIN GLARGINE 15 UNITS: 100 INJECTION, SOLUTION SUBCUTANEOUS at 09:51

## 2023-05-22 LAB
GLUCOSE BLD-MCNC: 107 MG/DL (ref 65–105)
GLUCOSE BLD-MCNC: 121 MG/DL (ref 65–105)
GLUCOSE BLD-MCNC: 171 MG/DL (ref 65–105)
GLUCOSE BLD-MCNC: 38 MG/DL (ref 65–105)
GLUCOSE BLD-MCNC: 65 MG/DL (ref 65–105)
GLUCOSE BLD-MCNC: 98 MG/DL (ref 65–105)

## 2023-05-22 PROCEDURE — 6370000000 HC RX 637 (ALT 250 FOR IP)

## 2023-05-22 PROCEDURE — 6360000002 HC RX W HCPCS: Performed by: INTERNAL MEDICINE

## 2023-05-22 PROCEDURE — 6370000000 HC RX 637 (ALT 250 FOR IP): Performed by: PSYCHIATRY & NEUROLOGY

## 2023-05-22 PROCEDURE — 99232 SBSQ HOSP IP/OBS MODERATE 35: CPT | Performed by: PSYCHIATRY & NEUROLOGY

## 2023-05-22 PROCEDURE — 1240000000 HC EMOTIONAL WELLNESS R&B

## 2023-05-22 PROCEDURE — 82947 ASSAY GLUCOSE BLOOD QUANT: CPT

## 2023-05-22 PROCEDURE — 6370000000 HC RX 637 (ALT 250 FOR IP): Performed by: INTERNAL MEDICINE

## 2023-05-22 PROCEDURE — 97129 THER IVNTJ 1ST 15 MIN: CPT

## 2023-05-22 PROCEDURE — 92526 ORAL FUNCTION THERAPY: CPT

## 2023-05-22 RX ADMIN — PRAVASTATIN SODIUM 40 MG: 40 TABLET ORAL at 20:37

## 2023-05-22 RX ADMIN — EMPAGLIFLOZIN 10 MG: 10 TABLET, FILM COATED ORAL at 10:09

## 2023-05-22 RX ADMIN — TOPIRAMATE 100 MG: 100 TABLET, FILM COATED ORAL at 20:37

## 2023-05-22 RX ADMIN — BENZTROPINE MESYLATE 1 MG: 1 TABLET ORAL at 10:09

## 2023-05-22 RX ADMIN — OLANZAPINE 20 MG: 10 TABLET, FILM COATED ORAL at 20:37

## 2023-05-22 RX ADMIN — BENZTROPINE MESYLATE 1 MG: 1 TABLET ORAL at 20:37

## 2023-05-22 RX ADMIN — TRAZODONE HYDROCHLORIDE 25 MG: 50 TABLET ORAL at 20:37

## 2023-05-22 RX ADMIN — THIAMINE HCL TAB 100 MG 100 MG: 100 TAB at 10:09

## 2023-05-22 RX ADMIN — ANTI-FUNGAL POWDER MICONAZOLE NITRATE TALC FREE: 1.42 POWDER TOPICAL at 20:38

## 2023-05-22 RX ADMIN — FOLIC ACID 1 MG: 1 TABLET ORAL at 10:09

## 2023-05-22 RX ADMIN — Medication 16 G: at 07:31

## 2023-05-22 RX ADMIN — FLUOXETINE 20 MG: 20 CAPSULE ORAL at 10:09

## 2023-05-22 RX ADMIN — ANTI-FUNGAL POWDER MICONAZOLE NITRATE TALC FREE: 1.42 POWDER TOPICAL at 10:07

## 2023-05-22 RX ADMIN — FONDAPARINUX SODIUM 10 MG: 10 INJECTION, SOLUTION SUBCUTANEOUS at 10:08

## 2023-05-23 LAB
GLUCOSE BLD-MCNC: 118 MG/DL (ref 65–105)
GLUCOSE BLD-MCNC: 199 MG/DL (ref 65–105)
GLUCOSE BLD-MCNC: 202 MG/DL (ref 65–105)
GLUCOSE BLD-MCNC: 73 MG/DL (ref 65–105)

## 2023-05-23 PROCEDURE — 1240000000 HC EMOTIONAL WELLNESS R&B

## 2023-05-23 PROCEDURE — 6370000000 HC RX 637 (ALT 250 FOR IP): Performed by: PSYCHIATRY & NEUROLOGY

## 2023-05-23 PROCEDURE — 6370000000 HC RX 637 (ALT 250 FOR IP): Performed by: INTERNAL MEDICINE

## 2023-05-23 PROCEDURE — 6370000000 HC RX 637 (ALT 250 FOR IP)

## 2023-05-23 PROCEDURE — 6360000002 HC RX W HCPCS: Performed by: INTERNAL MEDICINE

## 2023-05-23 PROCEDURE — 82947 ASSAY GLUCOSE BLOOD QUANT: CPT

## 2023-05-23 PROCEDURE — 99232 SBSQ HOSP IP/OBS MODERATE 35: CPT | Performed by: INTERNAL MEDICINE

## 2023-05-23 RX ADMIN — TOPIRAMATE 100 MG: 100 TABLET, FILM COATED ORAL at 21:39

## 2023-05-23 RX ADMIN — ANTI-FUNGAL POWDER MICONAZOLE NITRATE TALC FREE: 1.42 POWDER TOPICAL at 08:46

## 2023-05-23 RX ADMIN — THIAMINE HCL TAB 100 MG 100 MG: 100 TAB at 08:40

## 2023-05-23 RX ADMIN — HYDROXYZINE HYDROCHLORIDE 50 MG: 50 TABLET, FILM COATED ORAL at 21:39

## 2023-05-23 RX ADMIN — FOLIC ACID 1 MG: 1 TABLET ORAL at 08:40

## 2023-05-23 RX ADMIN — EMPAGLIFLOZIN 10 MG: 10 TABLET, FILM COATED ORAL at 08:40

## 2023-05-23 RX ADMIN — OLANZAPINE 20 MG: 10 TABLET, FILM COATED ORAL at 21:37

## 2023-05-23 RX ADMIN — TRAZODONE HYDROCHLORIDE 25 MG: 50 TABLET ORAL at 21:38

## 2023-05-23 RX ADMIN — PRAVASTATIN SODIUM 40 MG: 40 TABLET ORAL at 21:37

## 2023-05-23 RX ADMIN — INSULIN GLARGINE 15 UNITS: 100 INJECTION, SOLUTION SUBCUTANEOUS at 08:41

## 2023-05-23 RX ADMIN — BENZTROPINE MESYLATE 1 MG: 1 TABLET ORAL at 21:39

## 2023-05-23 RX ADMIN — FLUOXETINE 20 MG: 20 CAPSULE ORAL at 08:40

## 2023-05-23 RX ADMIN — INSULIN LISPRO 2 UNITS: 100 INJECTION, SOLUTION INTRAVENOUS; SUBCUTANEOUS at 12:13

## 2023-05-23 RX ADMIN — FONDAPARINUX SODIUM 10 MG: 10 INJECTION, SOLUTION SUBCUTANEOUS at 08:41

## 2023-05-23 RX ADMIN — BENZTROPINE MESYLATE 1 MG: 1 TABLET ORAL at 08:41

## 2023-05-24 LAB
GLUCOSE BLD-MCNC: 110 MG/DL (ref 65–105)
GLUCOSE BLD-MCNC: 148 MG/DL (ref 65–105)
GLUCOSE BLD-MCNC: 165 MG/DL (ref 65–105)
GLUCOSE BLD-MCNC: 299 MG/DL (ref 65–105)

## 2023-05-24 PROCEDURE — 82947 ASSAY GLUCOSE BLOOD QUANT: CPT

## 2023-05-24 PROCEDURE — 6370000000 HC RX 637 (ALT 250 FOR IP)

## 2023-05-24 PROCEDURE — 99232 SBSQ HOSP IP/OBS MODERATE 35: CPT | Performed by: INTERNAL MEDICINE

## 2023-05-24 PROCEDURE — 1240000000 HC EMOTIONAL WELLNESS R&B

## 2023-05-24 PROCEDURE — 6370000000 HC RX 637 (ALT 250 FOR IP): Performed by: INTERNAL MEDICINE

## 2023-05-24 PROCEDURE — 6370000000 HC RX 637 (ALT 250 FOR IP): Performed by: PSYCHIATRY & NEUROLOGY

## 2023-05-24 PROCEDURE — 6360000002 HC RX W HCPCS: Performed by: INTERNAL MEDICINE

## 2023-05-24 PROCEDURE — 97130 THER IVNTJ EA ADDL 15 MIN: CPT

## 2023-05-24 PROCEDURE — 97129 THER IVNTJ 1ST 15 MIN: CPT

## 2023-05-24 RX ORDER — TRAZODONE HYDROCHLORIDE 50 MG/1
50 TABLET ORAL NIGHTLY PRN
Status: DISCONTINUED | OUTPATIENT
Start: 2023-05-24 | End: 2023-05-30 | Stop reason: HOSPADM

## 2023-05-24 RX ORDER — TRAZODONE HYDROCHLORIDE 50 MG/1
50 TABLET ORAL NIGHTLY PRN
Qty: 30 TABLET | Refills: 0 | Status: SHIPPED | OUTPATIENT
Start: 2023-05-24 | End: 2023-05-30 | Stop reason: SDUPTHER

## 2023-05-24 RX ADMIN — HYDROXYZINE HYDROCHLORIDE 50 MG: 50 TABLET, FILM COATED ORAL at 20:59

## 2023-05-24 RX ADMIN — TRAZODONE HYDROCHLORIDE 50 MG: 50 TABLET ORAL at 20:59

## 2023-05-24 RX ADMIN — TOPIRAMATE 100 MG: 100 TABLET, FILM COATED ORAL at 20:59

## 2023-05-24 RX ADMIN — FLUOXETINE 20 MG: 20 CAPSULE ORAL at 09:31

## 2023-05-24 RX ADMIN — EMPAGLIFLOZIN 10 MG: 10 TABLET, FILM COATED ORAL at 09:30

## 2023-05-24 RX ADMIN — FONDAPARINUX SODIUM 10 MG: 10 INJECTION, SOLUTION SUBCUTANEOUS at 09:31

## 2023-05-24 RX ADMIN — THIAMINE HCL TAB 100 MG 100 MG: 100 TAB at 09:30

## 2023-05-24 RX ADMIN — OLANZAPINE 20 MG: 10 TABLET, FILM COATED ORAL at 20:59

## 2023-05-24 RX ADMIN — BENZTROPINE MESYLATE 1 MG: 1 TABLET ORAL at 20:59

## 2023-05-24 RX ADMIN — FOLIC ACID 1 MG: 1 TABLET ORAL at 09:30

## 2023-05-24 RX ADMIN — PRAVASTATIN SODIUM 40 MG: 40 TABLET ORAL at 20:59

## 2023-05-24 RX ADMIN — ANTI-FUNGAL POWDER MICONAZOLE NITRATE TALC FREE: 1.42 POWDER TOPICAL at 09:32

## 2023-05-24 RX ADMIN — INSULIN LISPRO 4 UNITS: 100 INJECTION, SOLUTION INTRAVENOUS; SUBCUTANEOUS at 12:29

## 2023-05-24 RX ADMIN — INSULIN GLARGINE 15 UNITS: 100 INJECTION, SOLUTION SUBCUTANEOUS at 09:34

## 2023-05-24 RX ADMIN — BENZTROPINE MESYLATE 1 MG: 1 TABLET ORAL at 09:30

## 2023-05-24 ASSESSMENT — PAIN SCALES - GENERAL
PAINLEVEL_OUTOF10: 0
PAINLEVEL_OUTOF10: 0

## 2023-05-25 LAB
GLUCOSE BLD-MCNC: 155 MG/DL (ref 65–105)
GLUCOSE BLD-MCNC: 161 MG/DL (ref 65–105)
GLUCOSE BLD-MCNC: 202 MG/DL (ref 65–105)
GLUCOSE BLD-MCNC: 78 MG/DL (ref 65–105)

## 2023-05-25 PROCEDURE — 97129 THER IVNTJ 1ST 15 MIN: CPT

## 2023-05-25 PROCEDURE — 6370000000 HC RX 637 (ALT 250 FOR IP): Performed by: INTERNAL MEDICINE

## 2023-05-25 PROCEDURE — 1240000000 HC EMOTIONAL WELLNESS R&B

## 2023-05-25 PROCEDURE — 6370000000 HC RX 637 (ALT 250 FOR IP)

## 2023-05-25 PROCEDURE — 99232 SBSQ HOSP IP/OBS MODERATE 35: CPT | Performed by: INTERNAL MEDICINE

## 2023-05-25 PROCEDURE — 82947 ASSAY GLUCOSE BLOOD QUANT: CPT

## 2023-05-25 PROCEDURE — 97535 SELF CARE MNGMENT TRAINING: CPT

## 2023-05-25 PROCEDURE — 6370000000 HC RX 637 (ALT 250 FOR IP): Performed by: PSYCHIATRY & NEUROLOGY

## 2023-05-25 PROCEDURE — 6360000002 HC RX W HCPCS: Performed by: INTERNAL MEDICINE

## 2023-05-25 RX ADMIN — ERGOCALCIFEROL 50000 UNITS: 1.25 CAPSULE ORAL at 08:51

## 2023-05-25 RX ADMIN — BENZTROPINE MESYLATE 1 MG: 1 TABLET ORAL at 08:50

## 2023-05-25 RX ADMIN — INSULIN LISPRO 2 UNITS: 100 INJECTION, SOLUTION INTRAVENOUS; SUBCUTANEOUS at 18:23

## 2023-05-25 RX ADMIN — TRAZODONE HYDROCHLORIDE 50 MG: 50 TABLET ORAL at 21:13

## 2023-05-25 RX ADMIN — HYDROXYZINE HYDROCHLORIDE 50 MG: 50 TABLET, FILM COATED ORAL at 21:14

## 2023-05-25 RX ADMIN — EMPAGLIFLOZIN 10 MG: 10 TABLET, FILM COATED ORAL at 08:51

## 2023-05-25 RX ADMIN — TOPIRAMATE 100 MG: 100 TABLET, FILM COATED ORAL at 21:14

## 2023-05-25 RX ADMIN — INSULIN GLARGINE 15 UNITS: 100 INJECTION, SOLUTION SUBCUTANEOUS at 12:30

## 2023-05-25 RX ADMIN — FOLIC ACID 1 MG: 1 TABLET ORAL at 08:50

## 2023-05-25 RX ADMIN — PRAVASTATIN SODIUM 40 MG: 40 TABLET ORAL at 21:13

## 2023-05-25 RX ADMIN — OLANZAPINE 20 MG: 10 TABLET, FILM COATED ORAL at 21:13

## 2023-05-25 RX ADMIN — HYDROXYZINE HYDROCHLORIDE 50 MG: 50 TABLET, FILM COATED ORAL at 01:05

## 2023-05-25 RX ADMIN — FONDAPARINUX SODIUM 10 MG: 10 INJECTION, SOLUTION SUBCUTANEOUS at 08:52

## 2023-05-25 RX ADMIN — FLUOXETINE 20 MG: 20 CAPSULE ORAL at 08:50

## 2023-05-25 RX ADMIN — BENZTROPINE MESYLATE 1 MG: 1 TABLET ORAL at 21:13

## 2023-05-25 RX ADMIN — ANTI-FUNGAL POWDER MICONAZOLE NITRATE TALC FREE: 1.42 POWDER TOPICAL at 08:51

## 2023-05-25 RX ADMIN — THIAMINE HCL TAB 100 MG 100 MG: 100 TAB at 08:51

## 2023-05-26 LAB
GLUCOSE BLD-MCNC: 182 MG/DL (ref 65–105)
GLUCOSE BLD-MCNC: 185 MG/DL (ref 65–105)
GLUCOSE BLD-MCNC: 207 MG/DL (ref 65–105)
GLUCOSE BLD-MCNC: 77 MG/DL (ref 65–105)

## 2023-05-26 PROCEDURE — 6370000000 HC RX 637 (ALT 250 FOR IP): Performed by: INTERNAL MEDICINE

## 2023-05-26 PROCEDURE — 6370000000 HC RX 637 (ALT 250 FOR IP)

## 2023-05-26 PROCEDURE — 82947 ASSAY GLUCOSE BLOOD QUANT: CPT

## 2023-05-26 PROCEDURE — 1240000000 HC EMOTIONAL WELLNESS R&B

## 2023-05-26 PROCEDURE — 99232 SBSQ HOSP IP/OBS MODERATE 35: CPT | Performed by: INTERNAL MEDICINE

## 2023-05-26 PROCEDURE — 97116 GAIT TRAINING THERAPY: CPT

## 2023-05-26 PROCEDURE — 6370000000 HC RX 637 (ALT 250 FOR IP): Performed by: PSYCHIATRY & NEUROLOGY

## 2023-05-26 PROCEDURE — 97110 THERAPEUTIC EXERCISES: CPT

## 2023-05-26 PROCEDURE — 6360000002 HC RX W HCPCS: Performed by: INTERNAL MEDICINE

## 2023-05-26 PROCEDURE — 99232 SBSQ HOSP IP/OBS MODERATE 35: CPT | Performed by: PSYCHIATRY & NEUROLOGY

## 2023-05-26 RX ORDER — ACETAMINOPHEN 325 MG/1
325 TABLET ORAL EVERY 8 HOURS PRN
Status: DISCONTINUED | OUTPATIENT
Start: 2023-05-26 | End: 2023-05-30 | Stop reason: HOSPADM

## 2023-05-26 RX ORDER — CYCLOBENZAPRINE HCL 10 MG
5 TABLET ORAL NIGHTLY PRN
Status: DISCONTINUED | OUTPATIENT
Start: 2023-05-26 | End: 2023-05-30 | Stop reason: HOSPADM

## 2023-05-26 RX ADMIN — INSULIN GLARGINE 15 UNITS: 100 INJECTION, SOLUTION SUBCUTANEOUS at 08:46

## 2023-05-26 RX ADMIN — OLANZAPINE 20 MG: 10 TABLET, FILM COATED ORAL at 20:36

## 2023-05-26 RX ADMIN — THIAMINE HCL TAB 100 MG 100 MG: 100 TAB at 08:47

## 2023-05-26 RX ADMIN — BENZTROPINE MESYLATE 1 MG: 1 TABLET ORAL at 08:47

## 2023-05-26 RX ADMIN — FONDAPARINUX SODIUM 10 MG: 10 INJECTION, SOLUTION SUBCUTANEOUS at 08:46

## 2023-05-26 RX ADMIN — FOLIC ACID 1 MG: 1 TABLET ORAL at 08:47

## 2023-05-26 RX ADMIN — EMPAGLIFLOZIN 10 MG: 10 TABLET, FILM COATED ORAL at 08:47

## 2023-05-26 RX ADMIN — HYDROXYZINE HYDROCHLORIDE 50 MG: 50 TABLET, FILM COATED ORAL at 20:36

## 2023-05-26 RX ADMIN — METOPROLOL TARTRATE 25 MG: 25 TABLET, FILM COATED ORAL at 20:36

## 2023-05-26 RX ADMIN — BENZTROPINE MESYLATE 1 MG: 1 TABLET ORAL at 20:36

## 2023-05-26 RX ADMIN — FLUOXETINE 20 MG: 20 CAPSULE ORAL at 08:47

## 2023-05-26 RX ADMIN — TOPIRAMATE 100 MG: 100 TABLET, FILM COATED ORAL at 20:36

## 2023-05-26 RX ADMIN — PRAVASTATIN SODIUM 40 MG: 40 TABLET ORAL at 20:37

## 2023-05-26 RX ADMIN — TRAZODONE HYDROCHLORIDE 50 MG: 50 TABLET ORAL at 20:37

## 2023-05-26 RX ADMIN — CYCLOBENZAPRINE 5 MG: 10 TABLET, FILM COATED ORAL at 20:37

## 2023-05-26 ASSESSMENT — PAIN SCALES - GENERAL: PAINLEVEL_OUTOF10: 3

## 2023-05-27 LAB
GLUCOSE BLD-MCNC: 100 MG/DL (ref 65–105)
GLUCOSE BLD-MCNC: 116 MG/DL (ref 65–105)
GLUCOSE BLD-MCNC: 167 MG/DL (ref 65–105)
GLUCOSE BLD-MCNC: 67 MG/DL (ref 65–105)
GLUCOSE BLD-MCNC: 68 MG/DL (ref 65–105)

## 2023-05-27 PROCEDURE — 6370000000 HC RX 637 (ALT 250 FOR IP)

## 2023-05-27 PROCEDURE — 6360000002 HC RX W HCPCS: Performed by: INTERNAL MEDICINE

## 2023-05-27 PROCEDURE — 6370000000 HC RX 637 (ALT 250 FOR IP): Performed by: INTERNAL MEDICINE

## 2023-05-27 PROCEDURE — 99232 SBSQ HOSP IP/OBS MODERATE 35: CPT | Performed by: NURSE PRACTITIONER

## 2023-05-27 PROCEDURE — 99231 SBSQ HOSP IP/OBS SF/LOW 25: CPT | Performed by: INTERNAL MEDICINE

## 2023-05-27 PROCEDURE — 6370000000 HC RX 637 (ALT 250 FOR IP): Performed by: PSYCHIATRY & NEUROLOGY

## 2023-05-27 PROCEDURE — 82947 ASSAY GLUCOSE BLOOD QUANT: CPT

## 2023-05-27 PROCEDURE — 1240000000 HC EMOTIONAL WELLNESS R&B

## 2023-05-27 RX ADMIN — BENZTROPINE MESYLATE 1 MG: 1 TABLET ORAL at 07:53

## 2023-05-27 RX ADMIN — HYDROXYZINE HYDROCHLORIDE 50 MG: 50 TABLET, FILM COATED ORAL at 21:10

## 2023-05-27 RX ADMIN — INSULIN GLARGINE 15 UNITS: 100 INJECTION, SOLUTION SUBCUTANEOUS at 07:53

## 2023-05-27 RX ADMIN — METOPROLOL TARTRATE 25 MG: 25 TABLET, FILM COATED ORAL at 21:16

## 2023-05-27 RX ADMIN — TOPIRAMATE 100 MG: 100 TABLET, FILM COATED ORAL at 21:10

## 2023-05-27 RX ADMIN — FONDAPARINUX SODIUM 10 MG: 10 INJECTION, SOLUTION SUBCUTANEOUS at 07:53

## 2023-05-27 RX ADMIN — FLUOXETINE 20 MG: 20 CAPSULE ORAL at 07:52

## 2023-05-27 RX ADMIN — ACETAMINOPHEN 325 MG: 325 TABLET ORAL at 16:34

## 2023-05-27 RX ADMIN — THIAMINE HCL TAB 100 MG 100 MG: 100 TAB at 07:52

## 2023-05-27 RX ADMIN — FOLIC ACID 1 MG: 1 TABLET ORAL at 07:52

## 2023-05-27 RX ADMIN — EMPAGLIFLOZIN 10 MG: 10 TABLET, FILM COATED ORAL at 07:53

## 2023-05-27 RX ADMIN — OLANZAPINE 20 MG: 10 TABLET, FILM COATED ORAL at 21:10

## 2023-05-27 RX ADMIN — PRAVASTATIN SODIUM 40 MG: 40 TABLET ORAL at 22:26

## 2023-05-27 RX ADMIN — BENZTROPINE MESYLATE 1 MG: 1 TABLET ORAL at 21:10

## 2023-05-27 RX ADMIN — TRAZODONE HYDROCHLORIDE 50 MG: 50 TABLET ORAL at 21:10

## 2023-05-27 ASSESSMENT — PAIN SCALES - GENERAL
PAINLEVEL_OUTOF10: 2
PAINLEVEL_OUTOF10: 0

## 2023-05-28 LAB
GLUCOSE BLD-MCNC: 164 MG/DL (ref 65–105)
GLUCOSE BLD-MCNC: 51 MG/DL (ref 65–105)
GLUCOSE BLD-MCNC: 57 MG/DL (ref 65–105)
GLUCOSE BLD-MCNC: 80 MG/DL (ref 65–105)
GLUCOSE BLD-MCNC: 97 MG/DL (ref 65–105)

## 2023-05-28 PROCEDURE — 6370000000 HC RX 637 (ALT 250 FOR IP): Performed by: NURSE PRACTITIONER

## 2023-05-28 PROCEDURE — 6370000000 HC RX 637 (ALT 250 FOR IP): Performed by: INTERNAL MEDICINE

## 2023-05-28 PROCEDURE — 6370000000 HC RX 637 (ALT 250 FOR IP)

## 2023-05-28 PROCEDURE — 1240000000 HC EMOTIONAL WELLNESS R&B

## 2023-05-28 PROCEDURE — 6370000000 HC RX 637 (ALT 250 FOR IP): Performed by: PSYCHIATRY & NEUROLOGY

## 2023-05-28 PROCEDURE — 99232 SBSQ HOSP IP/OBS MODERATE 35: CPT | Performed by: NURSE PRACTITIONER

## 2023-05-28 PROCEDURE — 82947 ASSAY GLUCOSE BLOOD QUANT: CPT

## 2023-05-28 PROCEDURE — 6360000002 HC RX W HCPCS: Performed by: INTERNAL MEDICINE

## 2023-05-28 RX ORDER — LANOLIN ALCOHOL/MO/W.PET/CERES
3 CREAM (GRAM) TOPICAL NIGHTLY PRN
Status: DISCONTINUED | OUTPATIENT
Start: 2023-05-28 | End: 2023-05-30 | Stop reason: HOSPADM

## 2023-05-28 RX ADMIN — HYDROXYZINE HYDROCHLORIDE 50 MG: 50 TABLET, FILM COATED ORAL at 21:22

## 2023-05-28 RX ADMIN — INSULIN GLARGINE 15 UNITS: 100 INJECTION, SOLUTION SUBCUTANEOUS at 09:04

## 2023-05-28 RX ADMIN — BENZTROPINE MESYLATE 1 MG: 1 TABLET ORAL at 09:03

## 2023-05-28 RX ADMIN — Medication 3 MG: at 21:22

## 2023-05-28 RX ADMIN — TOPIRAMATE 100 MG: 100 TABLET, FILM COATED ORAL at 21:23

## 2023-05-28 RX ADMIN — Medication 16 G: at 07:00

## 2023-05-28 RX ADMIN — OLANZAPINE 20 MG: 10 TABLET, FILM COATED ORAL at 21:23

## 2023-05-28 RX ADMIN — EMPAGLIFLOZIN 10 MG: 10 TABLET, FILM COATED ORAL at 09:07

## 2023-05-28 RX ADMIN — CYCLOBENZAPRINE 5 MG: 10 TABLET, FILM COATED ORAL at 21:22

## 2023-05-28 RX ADMIN — FLUOXETINE 20 MG: 20 CAPSULE ORAL at 09:01

## 2023-05-28 RX ADMIN — FONDAPARINUX SODIUM 10 MG: 10 INJECTION, SOLUTION SUBCUTANEOUS at 09:07

## 2023-05-28 RX ADMIN — THIAMINE HCL TAB 100 MG 100 MG: 100 TAB at 09:01

## 2023-05-28 RX ADMIN — PRAVASTATIN SODIUM 40 MG: 40 TABLET ORAL at 21:25

## 2023-05-28 RX ADMIN — METOPROLOL TARTRATE 25 MG: 25 TABLET, FILM COATED ORAL at 09:03

## 2023-05-28 RX ADMIN — BENZTROPINE MESYLATE 1 MG: 1 TABLET ORAL at 21:22

## 2023-05-28 RX ADMIN — FOLIC ACID 1 MG: 1 TABLET ORAL at 09:04

## 2023-05-28 RX ADMIN — ACETAMINOPHEN 325 MG: 325 TABLET ORAL at 09:01

## 2023-05-28 ASSESSMENT — PAIN DESCRIPTION - DESCRIPTORS: DESCRIPTORS: ACHING;DISCOMFORT

## 2023-05-28 ASSESSMENT — PAIN SCALES - GENERAL: PAINLEVEL_OUTOF10: 3

## 2023-05-28 ASSESSMENT — PAIN - FUNCTIONAL ASSESSMENT: PAIN_FUNCTIONAL_ASSESSMENT: ACTIVITIES ARE NOT PREVENTED

## 2023-05-28 ASSESSMENT — PAIN DESCRIPTION - ORIENTATION: ORIENTATION: MID

## 2023-05-28 ASSESSMENT — PAIN DESCRIPTION - LOCATION: LOCATION: BACK

## 2023-05-29 LAB
GLUCOSE BLD-MCNC: 103 MG/DL (ref 65–105)
GLUCOSE BLD-MCNC: 104 MG/DL (ref 65–105)
GLUCOSE BLD-MCNC: 168 MG/DL (ref 65–105)
GLUCOSE BLD-MCNC: 188 MG/DL (ref 65–105)
GLUCOSE BLD-MCNC: 42 MG/DL (ref 65–105)

## 2023-05-29 PROCEDURE — 99232 SBSQ HOSP IP/OBS MODERATE 35: CPT | Performed by: NURSE PRACTITIONER

## 2023-05-29 PROCEDURE — 1240000000 HC EMOTIONAL WELLNESS R&B

## 2023-05-29 PROCEDURE — 6370000000 HC RX 637 (ALT 250 FOR IP)

## 2023-05-29 PROCEDURE — 6370000000 HC RX 637 (ALT 250 FOR IP): Performed by: INTERNAL MEDICINE

## 2023-05-29 PROCEDURE — 6370000000 HC RX 637 (ALT 250 FOR IP): Performed by: PSYCHIATRY & NEUROLOGY

## 2023-05-29 PROCEDURE — 6360000002 HC RX W HCPCS: Performed by: INTERNAL MEDICINE

## 2023-05-29 PROCEDURE — 82947 ASSAY GLUCOSE BLOOD QUANT: CPT

## 2023-05-29 PROCEDURE — 99232 SBSQ HOSP IP/OBS MODERATE 35: CPT | Performed by: INTERNAL MEDICINE

## 2023-05-29 RX ORDER — HYDROXYZINE 50 MG/1
50 TABLET, FILM COATED ORAL 3 TIMES DAILY PRN
Qty: 30 TABLET | Refills: 0 | Status: SHIPPED | OUTPATIENT
Start: 2023-05-29 | End: 2023-05-30 | Stop reason: SDUPTHER

## 2023-05-29 RX ORDER — INSULIN GLARGINE 100 [IU]/ML
8 INJECTION, SOLUTION SUBCUTANEOUS DAILY
Qty: 10 ML | Refills: 3 | OUTPATIENT
Start: 2023-05-30

## 2023-05-29 RX ORDER — INSULIN GLARGINE 100 [IU]/ML
10 INJECTION, SOLUTION SUBCUTANEOUS DAILY
Status: DISCONTINUED | OUTPATIENT
Start: 2023-05-30 | End: 2023-05-29

## 2023-05-29 RX ORDER — CYCLOBENZAPRINE HCL 5 MG
5 TABLET ORAL NIGHTLY PRN
Qty: 30 TABLET | Refills: 0 | Status: SHIPPED | OUTPATIENT
Start: 2023-05-29 | End: 2023-05-30 | Stop reason: SDUPTHER

## 2023-05-29 RX ORDER — INSULIN GLARGINE 100 [IU]/ML
8 INJECTION, SOLUTION SUBCUTANEOUS DAILY
Status: DISCONTINUED | OUTPATIENT
Start: 2023-05-30 | End: 2023-05-30 | Stop reason: HOSPADM

## 2023-05-29 RX ADMIN — EMPAGLIFLOZIN 10 MG: 10 TABLET, FILM COATED ORAL at 08:33

## 2023-05-29 RX ADMIN — OLANZAPINE 20 MG: 10 TABLET, FILM COATED ORAL at 21:34

## 2023-05-29 RX ADMIN — THIAMINE HCL TAB 100 MG 100 MG: 100 TAB at 08:33

## 2023-05-29 RX ADMIN — FLUOXETINE 20 MG: 20 CAPSULE ORAL at 08:33

## 2023-05-29 RX ADMIN — FOLIC ACID 1 MG: 1 TABLET ORAL at 08:33

## 2023-05-29 RX ADMIN — FONDAPARINUX SODIUM 10 MG: 10 INJECTION, SOLUTION SUBCUTANEOUS at 08:32

## 2023-05-29 RX ADMIN — BENZTROPINE MESYLATE 1 MG: 1 TABLET ORAL at 08:33

## 2023-05-29 RX ADMIN — TOPIRAMATE 100 MG: 100 TABLET, FILM COATED ORAL at 21:34

## 2023-05-29 RX ADMIN — METOPROLOL TARTRATE 25 MG: 25 TABLET, FILM COATED ORAL at 21:34

## 2023-05-29 RX ADMIN — HYDROXYZINE HYDROCHLORIDE 50 MG: 50 TABLET, FILM COATED ORAL at 08:34

## 2023-05-29 RX ADMIN — TRAZODONE HYDROCHLORIDE 50 MG: 50 TABLET ORAL at 21:35

## 2023-05-29 RX ADMIN — ACETAMINOPHEN 325 MG: 325 TABLET ORAL at 08:34

## 2023-05-29 RX ADMIN — BENZTROPINE MESYLATE 1 MG: 1 TABLET ORAL at 21:35

## 2023-05-29 RX ADMIN — PRAVASTATIN SODIUM 40 MG: 40 TABLET ORAL at 21:37

## 2023-05-29 RX ADMIN — HYDROXYZINE HYDROCHLORIDE 50 MG: 50 TABLET, FILM COATED ORAL at 21:35

## 2023-05-29 ASSESSMENT — PAIN SCALES - GENERAL
PAINLEVEL_OUTOF10: 1
PAINLEVEL_OUTOF10: 1
PAINLEVEL_OUTOF10: 4

## 2023-05-29 ASSESSMENT — PAIN DESCRIPTION - PAIN TYPE: TYPE: CHRONIC PAIN;SURGICAL PAIN

## 2023-05-29 ASSESSMENT — PAIN DESCRIPTION - ORIENTATION: ORIENTATION: MID

## 2023-05-29 ASSESSMENT — PAIN - FUNCTIONAL ASSESSMENT: PAIN_FUNCTIONAL_ASSESSMENT: ACTIVITIES ARE NOT PREVENTED

## 2023-05-29 ASSESSMENT — PAIN DESCRIPTION - LOCATION: LOCATION: BACK

## 2023-05-29 ASSESSMENT — PAIN DESCRIPTION - DESCRIPTORS: DESCRIPTORS: ACHING;GNAWING

## 2023-05-29 NOTE — GROUP NOTE
Group Therapy Note    Date: 5/29/2023    Group Start Time: 1400  Group End Time: 1500  Group Topic: Cognitive Skills    [facility] D    Sunny Barron, CTRS        Group Therapy Note    Attendees: 9/17     Patient's Goal: To increase social interaction,and practice decision making, and communication skills. Notes:  Pt was pleasant and cooperative. Pt was able to practice decision making, and communication skills independently. Status After Intervention:  Improved     Participation Level: Active Listener ,interactive,supportive     Participation Quality: Appropriate, Attentive ,interactive, supportive        Speech:  Normal          Thought Process/Content: Logical r/t task, and group topic.        Affective Functioning: Congruent        Mood: Euthymic , laughed with humor in group     Level of consciousness:  Alert, and Attentive      Response to Learning: Attentive , able to verbalize current knowledge /experience, able to verbalize /acknowledge new learning, and Progressing to goal        Endings: None Reported     Modes of Intervention: Education, Support, Socialization, and Problem-solving        Discipline Responsible: Psychoeducational Specialist     Signature:  LUNA Gandara

## 2023-05-29 NOTE — PLAN OF CARE
Problem: Safety - Adult  Goal: Free from fall injury  5/28/2023 2129 by Benigno Sharif RN  Outcome: Progressing  Note: Patient is free from fall and injury     Problem: Self Harm/Suicidality  Goal: Will have no self-injury during hospital stay  Description: INTERVENTIONS:  1. Ensure constant observer at bedside with Q15M safety checks  2. Maintain a safe environment  3. Secure patient belongings  4. Ensure family/visitors adhere to safety recommendations  5. Ensure safety tray has been added to patient's diet order  6. Every shift and PRN: Re-assess suicidal risk via Frequent Screener    5/28/2023 2129 by Benigno Sharif RN  Note: Patient is out in day room talking to supports on phone. She is friendly and cooperative with staff. She denies depression . She denies suicidal ideation and thoughts of self harm. Patient is medication compliant. She is agreeable to take as needed melatonin for sleep. Staff maintains Q15 minute safety checks.

## 2023-05-29 NOTE — GROUP NOTE
Group Therapy Note    Date: 5/29/2023    Group Start Time: 0930  Group End Time: 0940  Group Topic: Community Meeting    [facility] BETHANY Newell        Group Therapy Note    Attendees: 8/18       Patient's Goal:  Finalize list of calls to make tomorrow. Notes:  Goals Group    Status After Intervention:  Unchanged    Participation Level:  Active Listener and Interactive    Participation Quality: Appropriate, Attentive, Sharing, and Supportive      Speech:  normal      Thought Process/Content: Logical  Linear      Affective Functioning: Congruent      Mood: euthymic      Level of consciousness:  Alert and Oriented x4      Response to Learning: Progressing to goal      Endings: None Reported    Modes of Intervention: Support, Socialization, and Exploration      Discipline Responsible: Behavorial Health Tech      Signature:  Brittney Newell

## 2023-05-29 NOTE — PLAN OF CARE
Problem: Safety - Adult  Goal: Free from fall injury  5/29/2023 1052 by Cathy Gaytan LPN  Outcome: Progressing  Flowsheets (Taken 5/26/2023 1231)  Free From Fall Injury: Instruct family/caregiver on patient safety  Note: Patient remains free from falls on the unit at this time. Patient has gripper socks on for fall prevention. Staff ensures safety by providing safety checks on the unit intermittently and every 15 minutes. Staff continues to provide a safe environment. 5/28/2023 2129 by Stephani Mercer RN  Outcome: Progressing  Note: Patient is free from fall and injury     Problem: Skin/Tissue Integrity  Goal: Absence of new skin breakdown  Description: 1. Monitor for areas of redness and/or skin breakdown  2. Assess vascular access sites hourly  3. Every 4-6 hours minimum:  Change oxygen saturation probe site  4. Every 4-6 hours:  If on nasal continuous positive airway pressure, respiratory therapy assess nares and determine need for appliance change or resting period. Outcome: Progressing  Note: Patient's skin continues to have areas that require treatment as noted. Patient agrees intermittently to have care provided to these areas. Staff continues to monitor and treat all areas of skin breakdown. Problem: Self Harm/Suicidality  Goal: Will have no self-injury during hospital stay  Description: INTERVENTIONS:  1. Ensure constant observer at bedside with Q15M safety checks  2. Maintain a safe environment  3. Secure patient belongings  4. Ensure family/visitors adhere to safety recommendations  5. Ensure safety tray has been added to patient's diet order  6. Every shift and PRN: Re-assess suicidal risk via Frequent Screener    5/28/2023 2129 by Stephani Mercer RN  Note: Patient is out in day room talking to supports on phone. She is friendly and cooperative with staff. She denies depression . She denies suicidal ideation and thoughts of self harm. Patient is medication compliant.

## 2023-05-30 VITALS
SYSTOLIC BLOOD PRESSURE: 110 MMHG | DIASTOLIC BLOOD PRESSURE: 55 MMHG | BODY MASS INDEX: 39.4 KG/M2 | RESPIRATION RATE: 14 BRPM | OXYGEN SATURATION: 100 % | TEMPERATURE: 97.7 F | HEIGHT: 68 IN | HEART RATE: 63 BPM | WEIGHT: 260 LBS

## 2023-05-30 LAB
GLUCOSE BLD-MCNC: 125 MG/DL (ref 65–105)
GLUCOSE BLD-MCNC: 83 MG/DL (ref 65–105)

## 2023-05-30 PROCEDURE — 6370000000 HC RX 637 (ALT 250 FOR IP): Performed by: INTERNAL MEDICINE

## 2023-05-30 PROCEDURE — 99239 HOSP IP/OBS DSCHRG MGMT >30: CPT | Performed by: NURSE PRACTITIONER

## 2023-05-30 PROCEDURE — 82947 ASSAY GLUCOSE BLOOD QUANT: CPT

## 2023-05-30 PROCEDURE — 6360000002 HC RX W HCPCS: Performed by: INTERNAL MEDICINE

## 2023-05-30 PROCEDURE — 6370000000 HC RX 637 (ALT 250 FOR IP)

## 2023-05-30 PROCEDURE — 97129 THER IVNTJ 1ST 15 MIN: CPT

## 2023-05-30 PROCEDURE — 97130 THER IVNTJ EA ADDL 15 MIN: CPT

## 2023-05-30 RX ORDER — TRAZODONE HYDROCHLORIDE 50 MG/1
50 TABLET ORAL NIGHTLY PRN
Qty: 30 TABLET | Refills: 0 | Status: SHIPPED | OUTPATIENT
Start: 2023-05-30

## 2023-05-30 RX ORDER — CYCLOBENZAPRINE HCL 5 MG
5 TABLET ORAL NIGHTLY PRN
Qty: 30 TABLET | Refills: 0 | Status: SHIPPED | OUTPATIENT
Start: 2023-05-30 | End: 2023-06-09

## 2023-05-30 RX ORDER — HYDROXYZINE 50 MG/1
50 TABLET, FILM COATED ORAL 3 TIMES DAILY PRN
Qty: 30 TABLET | Refills: 0 | Status: SHIPPED | OUTPATIENT
Start: 2023-05-30 | End: 2023-06-09

## 2023-05-30 RX ADMIN — BENZTROPINE MESYLATE 1 MG: 1 TABLET ORAL at 08:36

## 2023-05-30 RX ADMIN — INSULIN GLARGINE 8 UNITS: 100 INJECTION, SOLUTION SUBCUTANEOUS at 08:37

## 2023-05-30 RX ADMIN — FONDAPARINUX SODIUM 10 MG: 10 INJECTION, SOLUTION SUBCUTANEOUS at 08:38

## 2023-05-30 RX ADMIN — METOPROLOL TARTRATE 25 MG: 25 TABLET, FILM COATED ORAL at 08:36

## 2023-05-30 RX ADMIN — THIAMINE HCL TAB 100 MG 100 MG: 100 TAB at 08:36

## 2023-05-30 RX ADMIN — FLUOXETINE 20 MG: 20 CAPSULE ORAL at 08:36

## 2023-05-30 RX ADMIN — EMPAGLIFLOZIN 10 MG: 10 TABLET, FILM COATED ORAL at 08:36

## 2023-05-30 RX ADMIN — FOLIC ACID 1 MG: 1 TABLET ORAL at 08:36

## 2023-05-30 NOTE — GROUP NOTE
Group Therapy Note    Date: 5/30/2023    Group Start Time: 0930  Group End Time: 1000  Group Topic: Community Meeting    [facility]    Ramy Morales RN        Group Therapy Note    Attendees: 8       Patient's Goal:  Work on discharge for today waiting on     Notes: Work on discharge for today waiting on     Status After Intervention:  Unchanged    Participation Level:  Active Listener    Participation Quality: Appropriate      Speech:  normal      Thought Process/Content: Logical      Affective Functioning: Incongruent      Mood: anxious      Level of consciousness:  Oriented x4      Response to Learning: Able to verbalize current knowledge/experience      Endings: None Reported    Modes of Intervention: Education and Support      Discipline Responsible: Registered Nurse      Signature:  Ramy Morales RN

## 2023-05-30 NOTE — GROUP NOTE
Group Therapy Note    Date: 5/30/2023    Group Start Time: 2209  Group End Time: 1130  Group Topic: Psychoeducation    STEPHANIE RandhawaS        Group Therapy Note    Attendees: 8/16     Psych-Ed/Relapse Prevention         Date: May 30, 2023 Start Time: 1045am  End Time: 11:30am      Number of Participants in Group & Unit Census:  8/16    Topic: psych education group    Goal of Group:pt will demonstrate improved coping skills and improved communication skills       Comments:     Patient did not participate in Psych-Ed/Relapse Prevention group, despite staff encouragement and explanation of benefits. Patient remain seclusive to self. Q15 minute safety checks maintained for patient safety and will continue to encourage patient to attend unit programming.          Signature:  Juan R Benton

## 2023-05-30 NOTE — DISCHARGE INSTR - DIET

## 2023-05-30 NOTE — PLAN OF CARE
Problem: Chronic Conditions and Co-morbidities  Goal: Patient's chronic conditions and co-morbidity symptoms are monitored and maintained or improved  Outcome: Progressing     Problem: Safety - Adult  Goal: Free from fall injury  Outcome: Progressing     Problem: Skin/Tissue Integrity  Goal: Absence of new skin breakdown  Description: 1. Monitor for areas of redness and/or skin breakdown  2. Assess vascular access sites hourly  3. Every 4-6 hours minimum:  Change oxygen saturation probe site  4. Every 4-6 hours:  If on nasal continuous positive airway pressure, respiratory therapy assess nares and determine need for appliance change or resting period. Outcome: Progressing     Problem: Self Harm/Suicidality  Goal: Will have no self-injury during hospital stay  Description: INTERVENTIONS:  1. Ensure constant observer at bedside with Q15M safety checks  2. Maintain a safe environment  3. Secure patient belongings  4. Ensure family/visitors adhere to safety recommendations  5. Ensure safety tray has been added to patient's diet order  6. Every shift and PRN: Re-assess suicidal risk via Frequent Screener    Outcome: Progressing     Problem: Depression  Goal: Will be euthymic at discharge  Description: INTERVENTIONS:  1. Administer medication as ordered  2. Provide emotional support via 1:1 interaction with staff  3. Encourage involvement in milieu/groups/activities  4. Monitor for social isolation  Outcome: Progressing     Problem: ABCDS Injury Assessment  Goal: Absence of physical injury  Outcome: Progressing     Problem: Pain  Goal: Verbalizes/displays adequate comfort level or baseline comfort level  Outcome: Progressing   Patient denies suicidal ideations or thoughts to self harm during this shift. Patient reports adequate sleep and appetite. Pt denies pain during this shift. Q15 minute checks maintained.

## 2023-05-30 NOTE — CARE COORDINATION
Name: Abi Baker    : 1980    Discharge Date: 2023    Destination: home    Discharge Medications:      Medication List        START taking these medications      hydrOXYzine HCl 50 MG tablet  Commonly known as: ATARAX  Take 1 tablet by mouth 3 times daily as needed for Anxiety  Notes to patient: Anxiety     traZODone 50 MG tablet  Commonly known as: DESYREL  Take 1 tablet by mouth nightly as needed for Sleep  Notes to patient: Sleep            CHANGE how you take these medications      cyclobenzaprine 5 MG tablet  Commonly known as: FLEXERIL  Take 1 tablet by mouth nightly as needed for Muscle spasms  What changed:   medication strength  how much to take  when to take this  Notes to patient: Muscle spasm     metoprolol tartrate 25 MG tablet  Commonly known as: LOPRESSOR  Take 1 tablet by mouth 2 times daily  What changed:   medication strength  how much to take  Notes to patient: High Blood Pressure            CONTINUE taking these medications      benztropine 1 MG tablet  Commonly known as: COGENTIN  Take 1 tablet by mouth 2 times daily  Notes to patient: Prevents side effects     blood glucose test strips  Testing twice a day. Please dispense according to patients insurance. Notes to patient: Type 2 Diabetes     bumetanide 1 MG tablet  Commonly known as: BUMEX  Take 1 tablet by mouth daily  Notes to patient: Water weight gain     Farxiga 10 MG tablet  Generic drug: dapagliflozin  Notes to patient: Type 2 Diabetes     FLUoxetine 20 MG capsule  Commonly known as: PROZAC  Take 2 capsules by mouth daily  Notes to patient: Depression     folic acid 1 MG tablet  Commonly known as: FOLVITE  Take 1 tablet by mouth daily  Notes to patient: Mineral Supplement     fondaparinux 10 MG/0.8ML Soln injection  Commonly known as: ARIXTRA  Notes to patient: Anticoagulant/Blood thinner     glucagon 1 MG injection  Notes to patient: Type 2 Diabetes     glucose monitoring kit  Testing twice a day.   Please dispense

## 2023-05-30 NOTE — BH NOTE
[facility]  Discharge Note    Pt discharged with followings belongings:   Dental Appliances: Uppers, Lowers  Vision - Corrective Lenses: None  Hearing Aid: None  Jewelry: None  Body Piercings Removed: No  Clothing: Pants, Shirt, Socks, Undergarments  Other Valuables: Other (Comment) (none)   Valuables returned and sent home with patient and paperwork signed. Patient educated on aftercare instructions including medications and follow-up appointment. Information faxed to [facility] by staff  at [address] [facility] .Patient verbalize understanding of AVS.    Status EXAM upon discharge:  Mental Status and Behavioral Exam  Normal: No  Level of Assistance: Independent/Self  Facial Expression: Worried  Affect: Stable  Level of Consciousness: Alert  Frequency of Checks: 4 times per hour, close  Mood:Normal: No  Mood: Anxious  Motor Activity:Normal: No  Motor Activity: Decreased  Eye Contact: Fair  Observed Behavior: Cooperative, Preoccupied, Guarded, Friendly  Sexual Misconduct History: Current - no  Preception: [place] to person, [place] to time, [place] to place, [place] to situation  Attention:Normal: Yes  Attention: Distractible  Thought Processes: Unremarkable  Thought Content:Normal: No  Thought Content: Preoccupations  Depression Symptoms: No problems reported or observed. Anxiety Symptoms: Generalized  Michelle Symptoms: No problems reported or observed.   Hallucinations: None  Delusions: No  Memory:Normal: No  Memory: Poor recent, Poor remote  Insight and Judgment: No  Insight and Judgment: Poor judgment, Poor insight    Tobacco Screening:  Practical Counseling, on admission, roxanna X, if applicable and completed (first 3 are required if patient doesn't refuse):            ( ) Recognizing danger situations (included triggers and roadblocks)                    ( ) Coping skills (new ways to manage stress,relaxation techniques, changing routine, distraction)

## 2023-05-30 NOTE — DISCHARGE INSTRUCTIONS
pressure. You are severely dizzy or lightheaded. You are confused or can't think clearly. You have pale, gray, or blue-colored skin or lips. You pass out (lose consciousness) or are very hard to wake up. You have loss of balance or trouble walking. You have trouble seeing out of one or both eyes. You have weakness or drooping on one side of the face. You have weakness or numbness in an arm or a leg. You have trouble speaking. You have a severe headache. You have a seizure. Call your doctor now or seek immediate medical care if:    You have moderate trouble breathing. (You can't speak a full sentence.)     You are coughing up blood. You have signs of low blood pressure. These include feeling lightheaded; being too weak to stand; and having cold, pale, clammy skin. Watch closely for changes in your health, and be sure to contact your doctor if:    Your symptoms get worse. You are not getting better as expected. You have new or worse symptoms of anxiety, depression, nightmares, or flashbacks. Call before you go to the doctor's office. Follow their instructions. And wear a mask. Where can you learn more? Go to http://www.woods.com/ and enter C008 to learn more about \"Learning About Coronavirus (COVID-19). \"  Current as of: January 28, 2023               Content Version: 13.6  © 1315-2892 [employer]. Care instructions adapted under license by [facility] ([facility]). If you have questions about a medical condition or this instruction, always ask your healthcare professional. [place] [address] any warranty or liability for your use of this information. Smoking: Quit Smoking. Call the [employer]'s smoking quitline at 9-790-12O-QUIT  Know the signs of a heart attack   If you have any of the following symptoms call 911 immediately, do not wait more    Than five minutes.     1. Pressure, fullness and/ or squeezing in the

## 2023-05-30 NOTE — DISCHARGE SUMMARY
strips Testing twice a day. Please dispense according to patients insurance., Disp-200 strip, R-3, Normal           STOP taking these medications       buPROPion (WELLBUTRIN XL) 300 MG extended release tablet Comments:   Reason for Stopping:         LORazepam (ATIVAN) 1 MG tablet Comments:   Reason for Stopping:         dilTIAZem (CARDIZEM CD) 360 MG extended release capsule Comments:   Reason for Stopping:         Multiple Vitamins-Minerals (THERAPEUTIC MULTIVITAMIN-MINERALS) tablet Comments:   Reason for Stopping:         vitamin D (CHOLECALCIFEROL) 1000 UNIT TABS tablet Comments:   Reason for Stopping:                Core Measures statement:   Not applicable    Discharge Exam:  Level of consciousness:  Within normal limits  Appearance: Street clothes, seated, with good grooming  Behavior/Motor: No abnormalities noted  Attitude toward examiner:  Cooperative, attentive, good eye contact  Speech:  spontaneous, normal rate, normal volume and well articulated  Mood:  euthymic  Affect:  Full range  Thought processes:  linear, goal directed and coherent  Thought content:  denies homicidal ideation  Suicidal Ideation:  denies suicidal ideation  Delusions:  no evidence of delusions  Perceptual Disturbance:  denies any perceptual disturbance  Cognition:  Intact  Memory: age appropriate  Insight & Judgement: fair  Medication side effects: denies     Disposition: home    Patient Instructions: Activity: activity as tolerated  1. Patient instructed to take medications regularly and follow up with outpatient appointments. Follow-[facility] with [facility]       Signed:    Electronically signed by BERNARDA Escobar CNP on 5/30/23 at 4:58 PM EDT    Time Spent on discharge is more than 30 minutes in the examination, evaluation, counseling and review of medications and discharge plan. An electronic signature was used to authenticate this note.      **This report has been created using voice

## 2023-05-30 NOTE — GROUP NOTE
Group Therapy Note    Date: 5/30/2023    Group Start Time: 1330  Group End Time: 4623  Group Topic: cognitive skills     [facility] LUNA Tang        Group Therapy Note    Attendees 8/16       Patient's Goal:  pt will demonstrate improved social skills and improved coping skills     Notes:   pt was pleasant and participated well     Status After Intervention:  Improved    Participation Level:  Active Listener and Interactive    Participation Quality: Appropriate, Sharing, and Supportive      Speech:  normal      Thought Process/Content: Logical      Affective Functioning: Congruent      Mood: euthymic      Level of consciousness:  Alert      Response to Learning: Able to verbalize current knowledge/experience, Able to change behavior, and Progressing to goal      Endings: None Reported    Modes of Intervention: Support, Socialization, and Activity      Discipline Responsible: Psychoeducational Specialist      Signature:  Giovany Pagan

## 2023-05-30 NOTE — PROGRESS NOTES
SPEECH LANGUAGE PATHOLOGY  Speech Language Pathology  [facility]    Speech Language/dysphagia/cognitive  Treatment Note    Date: 5/30/2023  Patients Name: Garcia Oscar  MRN: [id number]  Diagnosis:   Patient Active Problem List   Diagnosis Code    Pernicious anemia D51.0    History of ulcer disease Z87.898    History of DVT of lower extremity Z86.718    Primary hypercoagulable state [facility]) D68.59    Amputation finger S68.119A    GERD (gastroesophageal reflux disease) K21.9    Alcohol dependence in remission ([facility] [address] F10.21    Type 2 diabetes mellitus with diabetic polyneuropathy, with long-term current use of insulin ([facility]) E11.42, Z79.4    Depression with suicidal ideation F32. A, R45.851    Primary insomnia F51.01    Essential hypertension I10    Bipolar affective disorder in remission ([facility] [address] F31.70    History of pulmonary embolism Z86.711    Antiphospholipid syndrome ([facility]) D68.61    Alcohol intoxication ([facility] F10.929    Hypertriglyceridemia E78.1    Chronic post-traumatic stress disorder (PTSD) F43.12    OCD (obsessive compulsive disorder) F42.9    Severe benzodiazepine use disorder (HCC) F13.20    Obesity, Class III, BMI 40-49.9 (morbid obesity) ([facility]) E66.01    History of MRSA infection Z86.14    Pain of upper abdomen R10.10    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    NAFLD (nonalcoholic fatty liver disease) K76.0    Nondependent opioid abuse in remission ([facility] [address] F11.11    Osteopenia M85.80    History of Awa-en-Y gastric bypass Z98.84    Acute cystitis with hematuria N30.01    Herpes genitalis A60.00    History of drug abuse ([facility]) F19.11    Malabsorption K90.9    History of pulmonary embolus (PE) Z86.711    Vitamin B 12 deficiency E53.8    Vitamin D deficiency E55.9    History of surgery of liver Z98.890    Blood alkaline phosphatase increased compared with prior measurement R74.8    Paroxysmal supraventricular tachycardia (HCC) I47.1    Anxiety F41.9    Hypomagnesemia E83.42    Chronic idiopathic
Algorithm**  Glucose: Dose:  If <139             No Insulin  140-199 2 Units  200-249 4 Units  250-299 6 Units  300-349 8 Units  350-400 10 Units  Above 400       12 Units 6/12/22   Isabella Newell MD   folic acid (FOLVITE) 1 MG tablet Take 1 tablet by mouth daily 6/13/22   Isabella Newell MD   Syringe/Needle, Disp, (SYRINGE 3CC/25GX1\") 25G X 1\" 3 ML MISC To be used with B12 injections monthly 2/13/21   Yoselin Brooks MD   pravastatin (PRAVACHOL) 40 MG tablet TAKE 1 TABLET(40 MG) BY MOUTH DAILY 6/26/20   Yoselin Brooks MD   glucose monitoring kit (FREESTYLE) monitoring kit Testing twice a day. Please dispense according to patients insurance. 12/20/19   Yoselin Brooks MD   Insulin Pen Needle 31G X 5 MM MISC 1 each by Does not apply route daily 12/20/19   Yoselin Brooks MD   Lancets 30G MISC Testing twice a day. Please dispense according to patients insurance. 12/20/19   Yoselin Brooks MD   blood glucose monitor strips Testing twice a day. Please dispense according to patients insurance. 12/20/19   Yoselin Brooks MD        Allergies:     Aspirin, Betadine [povidone iodine], Celexa [citalopram hydrobromide], Citalopram, Furosemide, Nitrofurantoin, Norco [hydrocodone-acetaminophen], Aripiprazole, Fentanyl citrate, Codeine, Fentanyl, Lithium, Morphine, Paroxetine, Paxil [paroxetine hcl], Povidone iodine, Sertraline, Tape [adhesive tape], and Tegretol [carbamazepine]    Social History:     Tobacco:    reports that she has never smoked. She has never used smokeless tobacco.  Alcohol:      reports current alcohol use. Drug Use:  reports no history of drug use.     Family History:     Family History   Problem Relation Age of Onset    Depression Mother     High Blood Pressure Mother     High Cholesterol Mother     Diabetes Father     Heart Disease Father     Kidney Disease Father     High Blood Pressure Father     High Cholesterol Father     Stroke Father     No Known Problems Brother
67  65 - 105 mg/dL Final    POC Glucose 05/27/2023 116 (H)  65 - 105 mg/dL Final    POC Glucose 05/27/2023 100  65 - 105 mg/dL Final    POC Glucose 05/27/2023 68  65 - 105 mg/dL Final    POC Glucose 05/27/2023 167 (H)  65 - 105 mg/dL Final    POC Glucose 05/28/2023 57 (L)  65 - 105 mg/dL Final    POC Glucose 05/28/2023 51 (L)  65 - 105 mg/dL Final    Critical Noted    POC Glucose 05/28/2023 80  65 - 105 mg/dL Final    POC Glucose 05/28/2023 164 (H)  65 - 105 mg/dL Final    POC Glucose 05/28/2023 97  65 - 105 mg/dL Final    POC Glucose 05/29/2023 42 (L)  65 - 105 mg/dL Final    Critical Noted    POC Glucose 05/29/2023 103  65 - 105 mg/dL Final    POC Glucose 05/29/2023 104  65 - 105 mg/dL Final         Reviewed patient's current plan of care and vital signs with nursing staff.     Labs reviewed: [x] Yes  EKG reviewed  QTC: 475 on 4/29/2023    Medications  Current Facility-Administered Medications: [START ON 5/30/2023] insulin glargine (LANTUS) injection vial 10 Units, 10 Units, SubCUTAneous, Daily  melatonin tablet 3 mg, 3 mg, Oral, Nightly PRN  acetaminophen (TYLENOL) tablet 325 mg, 325 mg, Oral, Q8H PRN  metoprolol tartrate (LOPRESSOR) tablet 25 mg, 25 mg, Oral, BID  cyclobenzaprine (FLEXERIL) tablet 5 mg, 5 mg, Oral, Nightly PRN  traZODone (DESYREL) tablet 50 mg, 50 mg, Oral, Nightly PRN  FLUoxetine (PROZAC) capsule 20 mg, 20 mg, Oral, Daily  OLANZapine (ZYPREXA) tablet 20 mg, 20 mg, Oral, Nightly  empagliflozin (JARDIANCE) tablet 10 mg, 10 mg, Oral, Daily  fondaparinux (ARIXTRA) injection 10 mg, 10 mg, SubCUTAneous, Daily  pravastatin (PRAVACHOL) tablet 40 mg, 40 mg, Oral, Nightly  insulin lispro (HUMALOG) injection vial 0-8 Units, 0-8 Units, SubCUTAneous, TID WC  insulin lispro (HUMALOG) injection vial 0-4 Units, 0-4 Units, SubCUTAneous, Nightly  benztropine (COGENTIN) tablet 1 mg, 1 mg, Orogastric, BID  dextrose 10 % infusion, , IntraVENous, Continuous PRN  dextrose bolus 10% 125 mL, 125 mL, IntraVENous, PRN

## 2023-06-26 ENCOUNTER — HOSPITAL ENCOUNTER (INPATIENT)
Age: 43
LOS: 3 days | Discharge: HOME | End: 2023-06-29
Attending: EMERGENCY MEDICINE | Admitting: SURGERY
Payer: MEDICARE

## 2023-06-26 ENCOUNTER — HOSPITAL ENCOUNTER (EMERGENCY)
Age: 43
Discharge: ANOTHER HOSPITAL | End: 2023-06-26
Attending: EMERGENCY MEDICINE
Payer: MEDICARE

## 2023-06-26 ENCOUNTER — APPOINTMENT (OUTPATIENT)
Dept: CT IMAGING | Age: 43
End: 2023-06-26
Payer: MEDICARE

## 2023-06-26 ENCOUNTER — APPOINTMENT (OUTPATIENT)
Dept: GENERAL RADIOLOGY | Age: 43
End: 2023-06-26
Payer: MEDICARE

## 2023-06-26 ENCOUNTER — APPOINTMENT (OUTPATIENT)
Dept: MRI IMAGING | Age: 43
End: 2023-06-26
Payer: MEDICARE

## 2023-06-26 VITALS
DIASTOLIC BLOOD PRESSURE: 66 MMHG | OXYGEN SATURATION: 100 % | WEIGHT: 260 LBS | BODY MASS INDEX: 39.4 KG/M2 | HEART RATE: 94 BPM | HEIGHT: 68 IN | RESPIRATION RATE: 15 BRPM | SYSTOLIC BLOOD PRESSURE: 124 MMHG | TEMPERATURE: 97.9 F

## 2023-06-26 DIAGNOSIS — I60.9 SAH (SUBARACHNOID HEMORRHAGE) (HCC): Primary | ICD-10-CM

## 2023-06-26 DIAGNOSIS — S01.81XA FACIAL LACERATION, INITIAL ENCOUNTER: ICD-10-CM

## 2023-06-26 DIAGNOSIS — F10.920 ACUTE ALCOHOLIC INTOXICATION WITHOUT COMPLICATION (HCC): ICD-10-CM

## 2023-06-26 DIAGNOSIS — R74.01 TRANSAMINITIS: ICD-10-CM

## 2023-06-26 LAB
ALBUMIN SERPL-MCNC: 3.1 G/DL (ref 3.5–5.2)
ALP SERPL-CCNC: 279 U/L (ref 35–104)
ALT SERPL-CCNC: 1108 U/L (ref 5–33)
ANION GAP SERPL CALCULATED.3IONS-SCNC: 17 MMOL/L (ref 9–17)
AST SERPL-CCNC: 661 U/L
B-OH-BUTYR SERPL-MCNC: 0.08 MMOL/L (ref 0.02–0.27)
BASOPHILS # BLD: 0 K/UL (ref 0–0.2)
BASOPHILS NFR BLD: 0 % (ref 0–2)
BILIRUB SERPL-MCNC: 0.5 MG/DL (ref 0.3–1.2)
BUN SERPL-MCNC: <2 MG/DL (ref 6–20)
CALCIUM SERPL-MCNC: 8.2 MG/DL (ref 8.6–10.4)
CHLORIDE SERPL-SCNC: 108 MMOL/L (ref 98–107)
CO2 SERPL-SCNC: 20 MMOL/L (ref 20–31)
COHGB MFR BLD: 1.7 % (ref 0–5)
CREAT SERPL-MCNC: 0.46 MG/DL (ref 0.5–0.9)
EOSINOPHIL # BLD: 0.03 K/UL (ref 0–0.4)
EOSINOPHILS RELATIVE PERCENT: 1 % (ref 0–4)
ERYTHROCYTE [DISTWIDTH] IN BLOOD BY AUTOMATED COUNT: 14.2 % (ref 11.5–14.9)
ETHANOL PERCENT: 0.32 %
ETHANOLAMINE SERPL-MCNC: 323 MG/DL
GFR SERPL CREATININE-BSD FRML MDRD: >60 ML/MIN/1.73M2
GLUCOSE BLD-MCNC: 217 MG/DL (ref 65–105)
GLUCOSE SERPL-MCNC: 372 MG/DL (ref 70–99)
HCO3 VENOUS: 17.8 MMOL/L (ref 24–30)
HCT VFR BLD AUTO: 43.9 % (ref 36–46)
HGB BLD-MCNC: 13.8 G/DL (ref 12–16)
INR PPP: 1
LYMPHOCYTES # BLD: 37 % (ref 24–44)
LYMPHOCYTES NFR BLD: 1.18 K/UL (ref 1–4.8)
MCH RBC QN AUTO: 30.5 PG (ref 26–34)
MCHC RBC AUTO-ENTMCNC: 31.5 G/DL (ref 31–37)
MCV RBC AUTO: 96.7 FL (ref 80–100)
METHEMOGLOBIN: 0.5 % (ref 0–1.9)
MONOCYTES NFR BLD: 0.13 K/UL (ref 0.1–1.3)
MONOCYTES NFR BLD: 4 % (ref 1–7)
MORPHOLOGY: NORMAL
NEGATIVE BASE EXCESS, VEN: 6.1 MMOL/L (ref 0–2)
NEUTROPHILS NFR BLD: 58 % (ref 36–66)
NEUTS SEG NFR BLD: 1.86 K/UL (ref 1.3–9.1)
O2 SAT, VEN: 92.1 % (ref 60–85)
PCO2, VEN: 25.6 MM HG (ref 39–55)
PH VENOUS: 7.45 (ref 7.32–7.42)
PLATELET # BLD AUTO: 258 K/UL (ref 150–450)
PMV BLD AUTO: 9 FL (ref 6–12)
PO2, VEN: 103 MM HG (ref 30–50)
POTASSIUM SERPL-SCNC: 3.9 MMOL/L (ref 3.7–5.3)
PROT SERPL-MCNC: 6.5 G/DL (ref 6.4–8.3)
PROTHROMBIN TIME: 13.5 SEC (ref 11.8–14.6)
RBC # BLD AUTO: 4.53 M/UL (ref 4–5.2)
SODIUM SERPL-SCNC: 145 MMOL/L (ref 135–144)
TEXT FOR RESPIRATORY: ABNORMAL
WBC OTHER # BLD: 3.2 K/UL (ref 3.5–11)

## 2023-06-26 PROCEDURE — 36415 COLL VENOUS BLD VENIPUNCTURE: CPT

## 2023-06-26 PROCEDURE — 99223 1ST HOSP IP/OBS HIGH 75: CPT | Performed by: SURGERY

## 2023-06-26 PROCEDURE — 2580000003 HC RX 258

## 2023-06-26 PROCEDURE — 82800 BLOOD PH: CPT

## 2023-06-26 PROCEDURE — 99285 EMERGENCY DEPT VISIT HI MDM: CPT

## 2023-06-26 PROCEDURE — 90715 TDAP VACCINE 7 YRS/> IM: CPT | Performed by: EMERGENCY MEDICINE

## 2023-06-26 PROCEDURE — 85610 PROTHROMBIN TIME: CPT

## 2023-06-26 PROCEDURE — 6360000004 HC RX CONTRAST MEDICATION: Performed by: STUDENT IN AN ORGANIZED HEALTH CARE EDUCATION/TRAINING PROGRAM

## 2023-06-26 PROCEDURE — A9579 GAD-BASE MR CONTRAST NOS,1ML: HCPCS | Performed by: STUDENT IN AN ORGANIZED HEALTH CARE EDUCATION/TRAINING PROGRAM

## 2023-06-26 PROCEDURE — 80053 COMPREHEN METABOLIC PANEL: CPT

## 2023-06-26 PROCEDURE — 96374 THER/PROPH/DIAG INJ IV PUSH: CPT

## 2023-06-26 PROCEDURE — 2000000000 HC ICU R&B

## 2023-06-26 PROCEDURE — 82947 ASSAY GLUCOSE BLOOD QUANT: CPT

## 2023-06-26 PROCEDURE — 70450 CT HEAD/BRAIN W/O DYE: CPT

## 2023-06-26 PROCEDURE — 96365 THER/PROPH/DIAG IV INF INIT: CPT

## 2023-06-26 PROCEDURE — 90471 IMMUNIZATION ADMIN: CPT | Performed by: EMERGENCY MEDICINE

## 2023-06-26 PROCEDURE — 51798 US URINE CAPACITY MEASURE: CPT

## 2023-06-26 PROCEDURE — G0480 DRUG TEST DEF 1-7 CLASSES: HCPCS

## 2023-06-26 PROCEDURE — 6360000002 HC RX W HCPCS: Performed by: REGISTERED NURSE

## 2023-06-26 PROCEDURE — 6370000000 HC RX 637 (ALT 250 FOR IP): Performed by: STUDENT IN AN ORGANIZED HEALTH CARE EDUCATION/TRAINING PROGRAM

## 2023-06-26 PROCEDURE — 3209999900 CT THORACIC SPINE TRAUMA RECONSTRUCTION

## 2023-06-26 PROCEDURE — 2580000003 HC RX 258: Performed by: EMERGENCY MEDICINE

## 2023-06-26 PROCEDURE — 3209999900 CT LUMBAR SPINE TRAUMA RECONSTRUCTION

## 2023-06-26 PROCEDURE — 85027 COMPLETE CBC AUTOMATED: CPT

## 2023-06-26 PROCEDURE — 0HQ1XZZ REPAIR FACE SKIN, EXTERNAL APPROACH: ICD-10-PCS | Performed by: SURGERY

## 2023-06-26 PROCEDURE — 6360000002 HC RX W HCPCS

## 2023-06-26 PROCEDURE — 82010 KETONE BODYS QUAN: CPT

## 2023-06-26 PROCEDURE — 70553 MRI BRAIN STEM W/O & W/DYE: CPT

## 2023-06-26 PROCEDURE — 2580000003 HC RX 258: Performed by: STUDENT IN AN ORGANIZED HEALTH CARE EDUCATION/TRAINING PROGRAM

## 2023-06-26 PROCEDURE — 6370000000 HC RX 637 (ALT 250 FOR IP)

## 2023-06-26 PROCEDURE — 82805 BLOOD GASES W/O2 SATURATION: CPT

## 2023-06-26 PROCEDURE — 72125 CT NECK SPINE W/O DYE: CPT

## 2023-06-26 PROCEDURE — 6360000002 HC RX W HCPCS: Performed by: EMERGENCY MEDICINE

## 2023-06-26 PROCEDURE — 71250 CT THORAX DX C-: CPT

## 2023-06-26 PROCEDURE — 71045 X-RAY EXAM CHEST 1 VIEW: CPT

## 2023-06-26 RX ORDER — SODIUM CHLORIDE 0.9 % (FLUSH) 0.9 %
5-40 SYRINGE (ML) INJECTION EVERY 12 HOURS SCHEDULED
Status: DISCONTINUED | OUTPATIENT
Start: 2023-06-26 | End: 2023-06-28

## 2023-06-26 RX ORDER — 0.9 % SODIUM CHLORIDE 0.9 %
500 INTRAVENOUS SOLUTION INTRAVENOUS ONCE
Status: COMPLETED | OUTPATIENT
Start: 2023-06-26 | End: 2023-06-26

## 2023-06-26 RX ORDER — LANOLIN ALCOHOL/MO/W.PET/CERES
100 CREAM (GRAM) TOPICAL DAILY
Status: DISCONTINUED | OUTPATIENT
Start: 2023-06-27 | End: 2023-06-29 | Stop reason: HOSPADM

## 2023-06-26 RX ORDER — MORPHINE SULFATE 4 MG/ML
2 INJECTION, SOLUTION INTRAMUSCULAR; INTRAVENOUS ONCE
Status: COMPLETED | OUTPATIENT
Start: 2023-06-26 | End: 2023-06-26

## 2023-06-26 RX ORDER — SODIUM CHLORIDE 9 MG/ML
INJECTION, SOLUTION INTRAVENOUS PRN
Status: DISCONTINUED | OUTPATIENT
Start: 2023-06-26 | End: 2023-06-28

## 2023-06-26 RX ORDER — THIAMINE HYDROCHLORIDE 100 MG/ML
200 INJECTION, SOLUTION INTRAMUSCULAR; INTRAVENOUS ONCE
Status: COMPLETED | OUTPATIENT
Start: 2023-06-26 | End: 2023-06-26

## 2023-06-26 RX ORDER — PHENOBARBITAL 32.4 MG/1
32.4 TABLET ORAL 3 TIMES DAILY
Status: DISCONTINUED | OUTPATIENT
Start: 2023-07-01 | End: 2023-06-29 | Stop reason: HOSPADM

## 2023-06-26 RX ORDER — SODIUM CHLORIDE 9 MG/ML
INJECTION, SOLUTION INTRAVENOUS PRN
Status: DISCONTINUED | OUTPATIENT
Start: 2023-06-26 | End: 2023-06-29

## 2023-06-26 RX ORDER — SODIUM CHLORIDE 9 MG/ML
INJECTION, SOLUTION INTRAVENOUS CONTINUOUS
Status: DISCONTINUED | OUTPATIENT
Start: 2023-06-26 | End: 2023-06-27

## 2023-06-26 RX ORDER — FOLIC ACID 1 MG/1
1 TABLET ORAL DAILY
Status: DISCONTINUED | OUTPATIENT
Start: 2023-06-26 | End: 2023-06-29 | Stop reason: HOSPADM

## 2023-06-26 RX ORDER — LEVETIRACETAM 5 MG/ML
500 INJECTION INTRAVASCULAR EVERY 12 HOURS
Status: DISCONTINUED | OUTPATIENT
Start: 2023-06-26 | End: 2023-06-28

## 2023-06-26 RX ORDER — MORPHINE SULFATE 4 MG/ML
4 INJECTION, SOLUTION INTRAMUSCULAR; INTRAVENOUS ONCE
Status: COMPLETED | OUTPATIENT
Start: 2023-06-26 | End: 2023-06-26

## 2023-06-26 RX ORDER — SODIUM CHLORIDE 0.9 % (FLUSH) 0.9 %
5-40 SYRINGE (ML) INJECTION PRN
Status: DISCONTINUED | OUTPATIENT
Start: 2023-06-26 | End: 2023-06-28

## 2023-06-26 RX ORDER — 0.9 % SODIUM CHLORIDE 0.9 %
1000 INTRAVENOUS SOLUTION INTRAVENOUS ONCE
Status: COMPLETED | OUTPATIENT
Start: 2023-06-26 | End: 2023-06-26

## 2023-06-26 RX ORDER — PHENOBARBITAL 64.8 MG/1
129.6 TABLET ORAL
Status: COMPLETED | OUTPATIENT
Start: 2023-06-27 | End: 2023-06-27

## 2023-06-26 RX ORDER — THIAMINE HYDROCHLORIDE 100 MG/ML
100 INJECTION, SOLUTION INTRAMUSCULAR; INTRAVENOUS DAILY
Status: DISCONTINUED | OUTPATIENT
Start: 2023-06-27 | End: 2023-06-26

## 2023-06-26 RX ORDER — OXYCODONE HYDROCHLORIDE 5 MG/1
5 TABLET ORAL EVERY 6 HOURS PRN
Status: DISCONTINUED | OUTPATIENT
Start: 2023-06-27 | End: 2023-06-29

## 2023-06-26 RX ORDER — PHENOBARBITAL 64.8 MG/1
128.4 TABLET ORAL
Status: DISCONTINUED | OUTPATIENT
Start: 2023-06-26 | End: 2023-06-26

## 2023-06-26 RX ORDER — PHENOBARBITAL 64.8 MG/1
130 TABLET ORAL
Status: DISCONTINUED | OUTPATIENT
Start: 2023-06-26 | End: 2023-06-26

## 2023-06-26 RX ORDER — SODIUM CHLORIDE 0.9 % (FLUSH) 0.9 %
5-40 SYRINGE (ML) INJECTION EVERY 12 HOURS SCHEDULED
Status: DISCONTINUED | OUTPATIENT
Start: 2023-06-26 | End: 2023-06-29

## 2023-06-26 RX ORDER — ONDANSETRON 4 MG/1
4 TABLET, ORALLY DISINTEGRATING ORAL EVERY 8 HOURS PRN
Status: DISCONTINUED | OUTPATIENT
Start: 2023-06-26 | End: 2023-06-29

## 2023-06-26 RX ORDER — PHENOBARBITAL 32.4 MG/1
32.4 TABLET ORAL 3 TIMES DAILY
Status: DISCONTINUED | OUTPATIENT
Start: 2023-07-01 | End: 2023-06-26

## 2023-06-26 RX ORDER — LEVETIRACETAM 500 MG/1
500 TABLET ORAL 2 TIMES DAILY
Status: DISCONTINUED | OUTPATIENT
Start: 2023-06-26 | End: 2023-06-26

## 2023-06-26 RX ORDER — OXYCODONE HYDROCHLORIDE 5 MG/1
5 TABLET ORAL ONCE
Status: COMPLETED | OUTPATIENT
Start: 2023-06-26 | End: 2023-06-26

## 2023-06-26 RX ORDER — PHENOBARBITAL 32.4 MG/1
30 TABLET ORAL 3 TIMES DAILY
Status: DISCONTINUED | OUTPATIENT
Start: 2023-07-01 | End: 2023-06-26

## 2023-06-26 RX ORDER — SODIUM CHLORIDE 0.9 % (FLUSH) 0.9 %
10 SYRINGE (ML) INJECTION PRN
Status: DISCONTINUED | OUTPATIENT
Start: 2023-06-26 | End: 2023-06-29 | Stop reason: HOSPADM

## 2023-06-26 RX ORDER — ONDANSETRON 2 MG/ML
4 INJECTION INTRAMUSCULAR; INTRAVENOUS EVERY 6 HOURS PRN
Status: DISCONTINUED | OUTPATIENT
Start: 2023-06-26 | End: 2023-06-29

## 2023-06-26 RX ORDER — POLYETHYLENE GLYCOL 3350 17 G/17G
17 POWDER, FOR SOLUTION ORAL DAILY
Status: DISCONTINUED | OUTPATIENT
Start: 2023-06-26 | End: 2023-06-29

## 2023-06-26 RX ADMIN — GADOTERIDOL 20 ML: 279.3 INJECTION, SOLUTION INTRAVENOUS at 19:28

## 2023-06-26 RX ADMIN — MORPHINE SULFATE 4 MG: 4 INJECTION INTRAVENOUS at 16:32

## 2023-06-26 RX ADMIN — SODIUM CHLORIDE 1000 ML: 9 INJECTION, SOLUTION INTRAVENOUS at 14:55

## 2023-06-26 RX ADMIN — LEVETIRACETAM 500 MG: 5 INJECTION, SOLUTION INTRAVENOUS at 18:37

## 2023-06-26 RX ADMIN — SODIUM CHLORIDE, PRESERVATIVE FREE 10 ML: 5 INJECTION INTRAVENOUS at 21:35

## 2023-06-26 RX ADMIN — TETANUS TOXOID, REDUCED DIPHTHERIA TOXOID AND ACELLULAR PERTUSSIS VACCINE, ADSORBED 0.5 ML: 5; 2.5; 8; 8; 2.5 SUSPENSION INTRAMUSCULAR at 14:59

## 2023-06-26 RX ADMIN — THIAMINE HYDROCHLORIDE 200 MG: 100 INJECTION, SOLUTION INTRAMUSCULAR; INTRAVENOUS at 16:49

## 2023-06-26 RX ADMIN — PHENOBARBITAL 129.6 MG: 64.8 TABLET ORAL at 23:18

## 2023-06-26 RX ADMIN — FOLIC ACID 1 MG: 1 TABLET ORAL at 21:40

## 2023-06-26 RX ADMIN — SODIUM CHLORIDE: 9 INJECTION, SOLUTION INTRAVENOUS at 21:17

## 2023-06-26 RX ADMIN — OXYCODONE HYDROCHLORIDE 5 MG: 5 TABLET ORAL at 21:40

## 2023-06-26 RX ADMIN — CEFAZOLIN 1000 MG: 1 INJECTION, POWDER, FOR SOLUTION INTRAMUSCULAR; INTRAVENOUS at 14:57

## 2023-06-26 RX ADMIN — SODIUM CHLORIDE 500 ML: 9 INJECTION, SOLUTION INTRAVENOUS at 16:50

## 2023-06-26 RX ADMIN — MORPHINE SULFATE 2 MG: 4 INJECTION INTRAVENOUS at 20:42

## 2023-06-26 RX ADMIN — SODIUM CHLORIDE, PRESERVATIVE FREE 10 ML: 5 INJECTION INTRAVENOUS at 23:17

## 2023-06-26 ASSESSMENT — PAIN SCALES - GENERAL
PAINLEVEL_OUTOF10: 6
PAINLEVEL_OUTOF10: 8
PAINLEVEL_OUTOF10: 6

## 2023-06-26 ASSESSMENT — VISUAL ACUITY: OU: 1

## 2023-06-26 ASSESSMENT — PAIN - FUNCTIONAL ASSESSMENT: PAIN_FUNCTIONAL_ASSESSMENT: 0-10

## 2023-06-26 ASSESSMENT — ENCOUNTER SYMPTOMS
TROUBLE SWALLOWING: 0
EYE PAIN: 0
CONSTIPATION: 0
COLOR CHANGE: 0
EYE REDNESS: 0
SHORTNESS OF BREATH: 0
BACK PAIN: 0
DIARRHEA: 0
SORE THROAT: 0
EYE DISCHARGE: 0
VOMITING: 0
COUGH: 0
WHEEZING: 0
RHINORRHEA: 0
ABDOMINAL PAIN: 0
NAUSEA: 0
FACIAL SWELLING: 0
VOMITING: 0
ABDOMINAL PAIN: 0
SINUS PRESSURE: 0
CHEST TIGHTNESS: 0
SORE THROAT: 0
NAUSEA: 0
PHOTOPHOBIA: 0
BLOOD IN STOOL: 0
SHORTNESS OF BREATH: 0

## 2023-06-26 ASSESSMENT — LIFESTYLE VARIABLES
HOW OFTEN DO YOU HAVE A DRINK CONTAINING ALCOHOL: 2-4 TIMES A MONTH
HOW MANY STANDARD DRINKS CONTAINING ALCOHOL DO YOU HAVE ON A TYPICAL DAY: 3 OR 4

## 2023-06-26 ASSESSMENT — PAIN DESCRIPTION - LOCATION
LOCATION: HEAD;BACK
LOCATION: HEAD;BACK

## 2023-06-27 ENCOUNTER — APPOINTMENT (OUTPATIENT)
Dept: MRI IMAGING | Age: 43
End: 2023-06-27
Payer: MEDICARE

## 2023-06-27 LAB
ALBUMIN SERPL-MCNC: 2.1 G/DL (ref 3.5–5.2)
ALBUMIN/GLOB SERPL: 0.8 {RATIO} (ref 1–2.5)
ALP SERPL-CCNC: 208 U/L (ref 35–104)
ALT SERPL-CCNC: 628 U/L (ref 5–33)
AMMONIA PLAS-SCNC: 60 UMOL/L (ref 11–51)
AMPHET UR QL SCN: NEGATIVE
ANION GAP SERPL CALCULATED.3IONS-SCNC: 11 MMOL/L (ref 9–17)
AST SERPL-CCNC: 298 U/L
BARBITURATES UR QL SCN: NEGATIVE
BASOPHILS # BLD: 0.03 K/UL (ref 0–0.2)
BASOPHILS NFR BLD: 1 % (ref 0–2)
BENZODIAZ UR QL: NEGATIVE
BILIRUB DIRECT SERPL-MCNC: 0.1 MG/DL
BILIRUB INDIRECT SERPL-MCNC: 0.4 MG/DL (ref 0–1)
BILIRUB SERPL-MCNC: 0.5 MG/DL (ref 0.3–1.2)
BUN SERPL-MCNC: 2 MG/DL (ref 6–20)
CA-I BLD-SCNC: 1.12 MMOL/L (ref 1.13–1.33)
CALCIUM SERPL-MCNC: 7.4 MG/DL (ref 8.6–10.4)
CANNABINOIDS UR QL SCN: NEGATIVE
CHLORIDE SERPL-SCNC: 108 MMOL/L (ref 98–107)
CO2 SERPL-SCNC: 19 MMOL/L (ref 20–31)
COCAINE UR QL SCN: NEGATIVE
CREAT SERPL-MCNC: 0.32 MG/DL (ref 0.5–0.9)
EOSINOPHIL # BLD: 0.14 K/UL (ref 0–0.44)
EOSINOPHILS RELATIVE PERCENT: 3 % (ref 1–4)
ERYTHROCYTE [DISTWIDTH] IN BLOOD BY AUTOMATED COUNT: 13 % (ref 11.8–14.4)
ETHANOL PERCENT: <0.01 %
ETHANOLAMINE SERPL-MCNC: <10 MG/DL
FENTANYL UR QL: NEGATIVE
GFR SERPL CREATININE-BSD FRML MDRD: >60 ML/MIN/1.73M2
GLUCOSE BLD-MCNC: 132 MG/DL (ref 65–105)
GLUCOSE BLD-MCNC: 237 MG/DL (ref 65–105)
GLUCOSE BLD-MCNC: 254 MG/DL (ref 65–105)
GLUCOSE BLD-MCNC: 287 MG/DL (ref 65–105)
GLUCOSE SERPL-MCNC: 150 MG/DL (ref 70–99)
HCT VFR BLD AUTO: 36.5 % (ref 36.3–47.1)
HGB BLD-MCNC: 11.1 G/DL (ref 11.9–15.1)
IMM GRANULOCYTES # BLD AUTO: <0.03 K/UL (ref 0–0.3)
IMM GRANULOCYTES NFR BLD: 0 %
LACTIC ACID, WHOLE BLOOD: 4.5 MMOL/L (ref 0.7–2.1)
LYMPHOCYTES # BLD: 30 % (ref 24–43)
LYMPHOCYTES NFR BLD: 1.24 K/UL (ref 1.1–3.7)
MAGNESIUM SERPL-MCNC: 1.6 MG/DL (ref 1.6–2.6)
MCH RBC QN AUTO: 31.3 PG (ref 25.2–33.5)
MCHC RBC AUTO-ENTMCNC: 30.4 G/DL (ref 28.4–34.8)
MCV RBC AUTO: 102.8 FL (ref 82.6–102.9)
METHADONE UR QL: NEGATIVE
MONOCYTES NFR BLD: 0.31 K/UL (ref 0.1–1.2)
MONOCYTES NFR BLD: 7 % (ref 3–12)
NEUTROPHILS NFR BLD: 59 % (ref 36–65)
NEUTS SEG NFR BLD: 2.45 K/UL (ref 1.5–8.1)
NRBC BLD-RTO: 0 PER 100 WBC
OPIATES UR QL SCN: POSITIVE
OXYCODONE UR QL SCN: POSITIVE
PCP UR QL SCN: NEGATIVE
PHOSPHATE SERPL-MCNC: 2 MG/DL (ref 2.6–4.5)
PLATELET # BLD AUTO: ABNORMAL K/UL (ref 138–453)
PLATELET, FLUORESCENCE: NORMAL K/UL (ref 138–453)
POTASSIUM SERPL-SCNC: 3.6 MMOL/L (ref 3.7–5.3)
PROT SERPL-MCNC: 4.9 G/DL (ref 6.4–8.3)
RBC # BLD AUTO: 3.55 M/UL (ref 3.95–5.11)
SODIUM SERPL-SCNC: 138 MMOL/L (ref 135–144)
TEST INFORMATION: ABNORMAL
WBC OTHER # BLD: 4.2 K/UL (ref 3.5–11.3)

## 2023-06-27 PROCEDURE — 80076 HEPATIC FUNCTION PANEL: CPT

## 2023-06-27 PROCEDURE — 83735 ASSAY OF MAGNESIUM: CPT

## 2023-06-27 PROCEDURE — 80307 DRUG TEST PRSMV CHEM ANLYZR: CPT

## 2023-06-27 PROCEDURE — 82330 ASSAY OF CALCIUM: CPT

## 2023-06-27 PROCEDURE — 83605 ASSAY OF LACTIC ACID: CPT

## 2023-06-27 PROCEDURE — 6360000002 HC RX W HCPCS: Performed by: REGISTERED NURSE

## 2023-06-27 PROCEDURE — 85027 COMPLETE CBC AUTOMATED: CPT

## 2023-06-27 PROCEDURE — 6370000000 HC RX 637 (ALT 250 FOR IP)

## 2023-06-27 PROCEDURE — 2580000003 HC RX 258: Performed by: STUDENT IN AN ORGANIZED HEALTH CARE EDUCATION/TRAINING PROGRAM

## 2023-06-27 PROCEDURE — 6370000000 HC RX 637 (ALT 250 FOR IP): Performed by: STUDENT IN AN ORGANIZED HEALTH CARE EDUCATION/TRAINING PROGRAM

## 2023-06-27 PROCEDURE — 82140 ASSAY OF AMMONIA: CPT

## 2023-06-27 PROCEDURE — 72141 MRI NECK SPINE W/O DYE: CPT

## 2023-06-27 PROCEDURE — 85055 RETICULATED PLATELET ASSAY: CPT

## 2023-06-27 PROCEDURE — 82947 ASSAY GLUCOSE BLOOD QUANT: CPT

## 2023-06-27 PROCEDURE — 80048 BASIC METABOLIC PNL TOTAL CA: CPT

## 2023-06-27 PROCEDURE — 36415 COLL VENOUS BLD VENIPUNCTURE: CPT

## 2023-06-27 PROCEDURE — G0480 DRUG TEST DEF 1-7 CLASSES: HCPCS

## 2023-06-27 PROCEDURE — 2580000003 HC RX 258

## 2023-06-27 PROCEDURE — 6360000002 HC RX W HCPCS: Performed by: STUDENT IN AN ORGANIZED HEALTH CARE EDUCATION/TRAINING PROGRAM

## 2023-06-27 PROCEDURE — 94761 N-INVAS EAR/PLS OXIMETRY MLT: CPT

## 2023-06-27 PROCEDURE — 92523 SPEECH SOUND LANG COMPREHEN: CPT

## 2023-06-27 PROCEDURE — 84100 ASSAY OF PHOSPHORUS: CPT

## 2023-06-27 PROCEDURE — 2060000000 HC ICU INTERMEDIATE R&B

## 2023-06-27 RX ORDER — INSULIN LISPRO 100 [IU]/ML
0-16 INJECTION, SOLUTION INTRAVENOUS; SUBCUTANEOUS
Status: DISCONTINUED | OUTPATIENT
Start: 2023-06-27 | End: 2023-06-28

## 2023-06-27 RX ORDER — CALCIUM GLUCONATE 20 MG/ML
2000 INJECTION, SOLUTION INTRAVENOUS ONCE
Status: COMPLETED | OUTPATIENT
Start: 2023-06-27 | End: 2023-06-27

## 2023-06-27 RX ORDER — INSULIN LISPRO 100 [IU]/ML
0-4 INJECTION, SOLUTION INTRAVENOUS; SUBCUTANEOUS NIGHTLY
Status: DISCONTINUED | OUTPATIENT
Start: 2023-06-27 | End: 2023-06-28

## 2023-06-27 RX ORDER — ENOXAPARIN SODIUM 100 MG/ML
30 INJECTION SUBCUTANEOUS 2 TIMES DAILY
Status: DISCONTINUED | OUTPATIENT
Start: 2023-06-27 | End: 2023-06-29 | Stop reason: HOSPADM

## 2023-06-27 RX ORDER — METHOCARBAMOL 750 MG/1
750 TABLET, FILM COATED ORAL 4 TIMES DAILY
Status: DISCONTINUED | OUTPATIENT
Start: 2023-06-27 | End: 2023-06-28

## 2023-06-27 RX ORDER — TOPIRAMATE 100 MG/1
100 TABLET, FILM COATED ORAL NIGHTLY
Status: DISCONTINUED | OUTPATIENT
Start: 2023-06-27 | End: 2023-06-29 | Stop reason: HOSPADM

## 2023-06-27 RX ORDER — DEXTROSE MONOHYDRATE 100 MG/ML
INJECTION, SOLUTION INTRAVENOUS CONTINUOUS PRN
Status: DISCONTINUED | OUTPATIENT
Start: 2023-06-27 | End: 2023-06-29

## 2023-06-27 RX ORDER — MAGNESIUM SULFATE IN WATER 40 MG/ML
2000 INJECTION, SOLUTION INTRAVENOUS
Status: COMPLETED | OUTPATIENT
Start: 2023-06-27 | End: 2023-06-27

## 2023-06-27 RX ORDER — GABAPENTIN 300 MG/1
300 CAPSULE ORAL 3 TIMES DAILY
Status: DISCONTINUED | OUTPATIENT
Start: 2023-06-27 | End: 2023-06-28

## 2023-06-27 RX ORDER — LABETALOL HYDROCHLORIDE 5 MG/ML
10 INJECTION, SOLUTION INTRAVENOUS
Status: DISCONTINUED | OUTPATIENT
Start: 2023-06-27 | End: 2023-06-28

## 2023-06-27 RX ORDER — OLANZAPINE 15 MG/1
15 TABLET ORAL NIGHTLY
Status: DISCONTINUED | OUTPATIENT
Start: 2023-06-27 | End: 2023-06-29 | Stop reason: HOSPADM

## 2023-06-27 RX ORDER — HYDRALAZINE HYDROCHLORIDE 20 MG/ML
10 INJECTION INTRAMUSCULAR; INTRAVENOUS EVERY 4 HOURS PRN
Status: DISCONTINUED | OUTPATIENT
Start: 2023-06-27 | End: 2023-06-28

## 2023-06-27 RX ORDER — ACETAMINOPHEN 500 MG
1000 TABLET ORAL EVERY 8 HOURS PRN
Status: DISCONTINUED | OUTPATIENT
Start: 2023-06-27 | End: 2023-06-27

## 2023-06-27 RX ORDER — FLUOXETINE HYDROCHLORIDE 20 MG/1
40 CAPSULE ORAL DAILY
Status: DISCONTINUED | OUTPATIENT
Start: 2023-06-27 | End: 2023-06-29 | Stop reason: HOSPADM

## 2023-06-27 RX ORDER — ACETAMINOPHEN 500 MG
1000 TABLET ORAL EVERY 8 HOURS SCHEDULED
Status: DISCONTINUED | OUTPATIENT
Start: 2023-06-27 | End: 2023-06-29

## 2023-06-27 RX ADMIN — GABAPENTIN 300 MG: 300 CAPSULE ORAL at 13:37

## 2023-06-27 RX ADMIN — LEVETIRACETAM 500 MG: 5 INJECTION, SOLUTION INTRAVENOUS at 05:35

## 2023-06-27 RX ADMIN — INSULIN LISPRO 8 UNITS: 100 INJECTION, SOLUTION INTRAVENOUS; SUBCUTANEOUS at 17:07

## 2023-06-27 RX ADMIN — LEVETIRACETAM 500 MG: 5 INJECTION, SOLUTION INTRAVENOUS at 18:37

## 2023-06-27 RX ADMIN — ENOXAPARIN SODIUM 30 MG: 30 INJECTION SUBCUTANEOUS at 21:08

## 2023-06-27 RX ADMIN — GABAPENTIN 300 MG: 300 CAPSULE ORAL at 09:11

## 2023-06-27 RX ADMIN — PHENOBARBITAL 97.2 MG: 32.4 TABLET ORAL at 09:01

## 2023-06-27 RX ADMIN — OXYCODONE HYDROCHLORIDE 5 MG: 5 TABLET ORAL at 14:53

## 2023-06-27 RX ADMIN — ACETAMINOPHEN 1000 MG: 500 TABLET ORAL at 13:37

## 2023-06-27 RX ADMIN — CALCIUM GLUCONATE 2000 MG: 20 INJECTION, SOLUTION INTRAVENOUS at 09:10

## 2023-06-27 RX ADMIN — METHOCARBAMOL TABLETS 750 MG: 750 TABLET, COATED ORAL at 13:37

## 2023-06-27 RX ADMIN — ACETAMINOPHEN 1000 MG: 500 TABLET ORAL at 21:08

## 2023-06-27 RX ADMIN — SODIUM CHLORIDE, PRESERVATIVE FREE 10 ML: 5 INJECTION INTRAVENOUS at 09:00

## 2023-06-27 RX ADMIN — OXYCODONE HYDROCHLORIDE 5 MG: 5 TABLET ORAL at 01:56

## 2023-06-27 RX ADMIN — SODIUM CHLORIDE, PRESERVATIVE FREE 10 ML: 5 INJECTION INTRAVENOUS at 21:30

## 2023-06-27 RX ADMIN — INSULIN LISPRO 4 UNITS: 100 INJECTION, SOLUTION INTRAVENOUS; SUBCUTANEOUS at 11:29

## 2023-06-27 RX ADMIN — ONDANSETRON 4 MG: 2 INJECTION INTRAMUSCULAR; INTRAVENOUS at 08:17

## 2023-06-27 RX ADMIN — PHENOBARBITAL 97.2 MG: 32.4 TABLET ORAL at 21:08

## 2023-06-27 RX ADMIN — OXYCODONE HYDROCHLORIDE 5 MG: 5 TABLET ORAL at 09:00

## 2023-06-27 RX ADMIN — OLANZAPINE 15 MG: 15 TABLET, FILM COATED ORAL at 21:08

## 2023-06-27 RX ADMIN — PHENOBARBITAL 129.6 MG: 64.8 TABLET ORAL at 00:20

## 2023-06-27 RX ADMIN — MAGNESIUM SULFATE HEPTAHYDRATE 2000 MG: 40 INJECTION, SOLUTION INTRAVENOUS at 11:17

## 2023-06-27 RX ADMIN — MAGNESIUM SULFATE HEPTAHYDRATE 2000 MG: 40 INJECTION, SOLUTION INTRAVENOUS at 13:00

## 2023-06-27 RX ADMIN — TOPIRAMATE 100 MG: 100 TABLET, FILM COATED ORAL at 21:08

## 2023-06-27 RX ADMIN — OXYCODONE HYDROCHLORIDE 5 MG: 5 TABLET ORAL at 21:08

## 2023-06-27 RX ADMIN — METHOCARBAMOL TABLETS 750 MG: 750 TABLET, COATED ORAL at 21:08

## 2023-06-27 RX ADMIN — FLUOXETINE HYDROCHLORIDE 40 MG: 20 CAPSULE ORAL at 09:02

## 2023-06-27 RX ADMIN — PHENOBARBITAL 97.2 MG: 32.4 TABLET ORAL at 13:38

## 2023-06-27 RX ADMIN — METOPROLOL TARTRATE 25 MG: 25 TABLET ORAL at 09:18

## 2023-06-27 RX ADMIN — Medication 100 MG: at 09:00

## 2023-06-27 RX ADMIN — FOLIC ACID 1 MG: 1 TABLET ORAL at 09:00

## 2023-06-27 RX ADMIN — DIBASIC SODIUM PHOSPHATE, MONOBASIC POTASSIUM PHOSPHATE AND MONOBASIC SODIUM PHOSPHATE 2 TABLET: 852; 155; 130 TABLET ORAL at 09:18

## 2023-06-27 RX ADMIN — METHOCARBAMOL TABLETS 750 MG: 750 TABLET, COATED ORAL at 09:11

## 2023-06-27 RX ADMIN — ENOXAPARIN SODIUM 30 MG: 30 INJECTION SUBCUTANEOUS at 09:11

## 2023-06-27 RX ADMIN — METOPROLOL TARTRATE 25 MG: 25 TABLET ORAL at 21:08

## 2023-06-27 RX ADMIN — METHOCARBAMOL TABLETS 750 MG: 750 TABLET, COATED ORAL at 17:01

## 2023-06-27 RX ADMIN — GABAPENTIN 300 MG: 300 CAPSULE ORAL at 21:08

## 2023-06-27 ASSESSMENT — PAIN DESCRIPTION - FREQUENCY
FREQUENCY: CONTINUOUS

## 2023-06-27 ASSESSMENT — PAIN DESCRIPTION - ONSET
ONSET: ON-GOING
ONSET: PROGRESSIVE
ONSET: ON-GOING

## 2023-06-27 ASSESSMENT — PAIN DESCRIPTION - LOCATION
LOCATION: HEAD;BACK
LOCATION: FACE
LOCATION: BACK
LOCATION: HEAD;EYE
LOCATION: FACE

## 2023-06-27 ASSESSMENT — PAIN DESCRIPTION - PAIN TYPE
TYPE: ACUTE PAIN

## 2023-06-27 ASSESSMENT — PAIN SCALES - GENERAL
PAINLEVEL_OUTOF10: 7
PAINLEVEL_OUTOF10: 6
PAINLEVEL_OUTOF10: 9
PAINLEVEL_OUTOF10: 7
PAINLEVEL_OUTOF10: 7
PAINLEVEL_OUTOF10: 5
PAINLEVEL_OUTOF10: 5
PAINLEVEL_OUTOF10: 7
PAINLEVEL_OUTOF10: 3
PAINLEVEL_OUTOF10: 7
PAINLEVEL_OUTOF10: 6
PAINLEVEL_OUTOF10: 4
PAINLEVEL_OUTOF10: 6
PAINLEVEL_OUTOF10: 5
PAINLEVEL_OUTOF10: 8

## 2023-06-27 ASSESSMENT — PAIN - FUNCTIONAL ASSESSMENT
PAIN_FUNCTIONAL_ASSESSMENT: PREVENTS OR INTERFERES WITH MANY ACTIVE NOT PASSIVE ACTIVITIES
PAIN_FUNCTIONAL_ASSESSMENT: ACTIVITIES ARE NOT PREVENTED
PAIN_FUNCTIONAL_ASSESSMENT: ACTIVITIES ARE NOT PREVENTED
PAIN_FUNCTIONAL_ASSESSMENT: PREVENTS OR INTERFERES SOME ACTIVE ACTIVITIES AND ADLS
PAIN_FUNCTIONAL_ASSESSMENT: PREVENTS OR INTERFERES WITH ALL ACTIVE AND SOME PASSIVE ACTIVITIES

## 2023-06-27 ASSESSMENT — PAIN DESCRIPTION - DESCRIPTORS
DESCRIPTORS: ACHING
DESCRIPTORS: ACHING;SHARP
DESCRIPTORS: ACHING

## 2023-06-27 ASSESSMENT — PAIN DESCRIPTION - ORIENTATION
ORIENTATION: MID
ORIENTATION: MID
ORIENTATION: RIGHT

## 2023-06-28 LAB
AMMONIA PLAS-SCNC: 26 UMOL/L (ref 11–51)
ANION GAP SERPL CALCULATED.3IONS-SCNC: 11 MMOL/L (ref 9–17)
BASOPHILS # BLD: 0.02 K/UL (ref 0–0.2)
BASOPHILS NFR BLD: 1 % (ref 0–2)
BUN SERPL-MCNC: 4 MG/DL (ref 6–20)
CALCIUM SERPL-MCNC: 7.8 MG/DL (ref 8.6–10.4)
CHLORIDE SERPL-SCNC: 101 MMOL/L (ref 98–107)
CO2 SERPL-SCNC: 23 MMOL/L (ref 20–31)
CREAT SERPL-MCNC: 0.58 MG/DL (ref 0.5–0.9)
EOSINOPHIL # BLD: 0.02 K/UL (ref 0–0.4)
EOSINOPHILS RELATIVE PERCENT: 1 % (ref 1–4)
ERYTHROCYTE [DISTWIDTH] IN BLOOD BY AUTOMATED COUNT: 13 % (ref 11.8–14.4)
EST. AVERAGE GLUCOSE BLD GHB EST-MCNC: 163 MG/DL
GFR SERPL CREATININE-BSD FRML MDRD: >60 ML/MIN/1.73M2
GLUCOSE BLD-MCNC: 146 MG/DL (ref 65–105)
GLUCOSE BLD-MCNC: 180 MG/DL (ref 65–105)
GLUCOSE BLD-MCNC: 184 MG/DL (ref 65–105)
GLUCOSE BLD-MCNC: 383 MG/DL (ref 65–105)
GLUCOSE SERPL-MCNC: 188 MG/DL (ref 70–99)
HBA1C MFR BLD: 7.3 % (ref 4–6)
HCT VFR BLD AUTO: 39.9 % (ref 36.3–47.1)
HGB BLD-MCNC: 12 G/DL (ref 11.9–15.1)
IMM GRANULOCYTES # BLD AUTO: 0 K/UL (ref 0–0.3)
IMM GRANULOCYTES NFR BLD: 0 %
LYMPHOCYTES # BLD: 36 % (ref 24–44)
LYMPHOCYTES NFR BLD: 0.72 K/UL (ref 1–4.8)
MAGNESIUM SERPL-MCNC: 2.1 MG/DL (ref 1.6–2.6)
MCH RBC QN AUTO: 31 PG (ref 25.2–33.5)
MCHC RBC AUTO-ENTMCNC: 30.1 G/DL (ref 28.4–34.8)
MCV RBC AUTO: 103.1 FL (ref 82.6–102.9)
MONOCYTES NFR BLD: 0.14 K/UL (ref 0.1–0.8)
MONOCYTES NFR BLD: 7 % (ref 1–7)
MORPHOLOGY: ABNORMAL
NEUTROPHILS NFR BLD: 55 % (ref 36–66)
NEUTS SEG NFR BLD: 1.1 K/UL (ref 1.8–7.7)
NRBC BLD-RTO: 0 PER 100 WBC
PHOSPHATE SERPL-MCNC: 2.6 MG/DL (ref 2.6–4.5)
PLATELET # BLD AUTO: 140 K/UL (ref 138–453)
PMV BLD AUTO: 11.1 FL (ref 8.1–13.5)
POTASSIUM SERPL-SCNC: 3.8 MMOL/L (ref 3.7–5.3)
RBC # BLD AUTO: 3.87 M/UL (ref 3.95–5.11)
SODIUM SERPL-SCNC: 135 MMOL/L (ref 135–144)
WBC OTHER # BLD: 2 K/UL (ref 3.5–11.3)

## 2023-06-28 PROCEDURE — 97530 THERAPEUTIC ACTIVITIES: CPT

## 2023-06-28 PROCEDURE — 82140 ASSAY OF AMMONIA: CPT

## 2023-06-28 PROCEDURE — 83735 ASSAY OF MAGNESIUM: CPT

## 2023-06-28 PROCEDURE — 97535 SELF CARE MNGMENT TRAINING: CPT

## 2023-06-28 PROCEDURE — 2580000003 HC RX 258: Performed by: STUDENT IN AN ORGANIZED HEALTH CARE EDUCATION/TRAINING PROGRAM

## 2023-06-28 PROCEDURE — 97162 PT EVAL MOD COMPLEX 30 MIN: CPT

## 2023-06-28 PROCEDURE — 6360000002 HC RX W HCPCS: Performed by: STUDENT IN AN ORGANIZED HEALTH CARE EDUCATION/TRAINING PROGRAM

## 2023-06-28 PROCEDURE — 36415 COLL VENOUS BLD VENIPUNCTURE: CPT

## 2023-06-28 PROCEDURE — 80048 BASIC METABOLIC PNL TOTAL CA: CPT

## 2023-06-28 PROCEDURE — 6370000000 HC RX 637 (ALT 250 FOR IP)

## 2023-06-28 PROCEDURE — 6370000000 HC RX 637 (ALT 250 FOR IP): Performed by: NURSE PRACTITIONER

## 2023-06-28 PROCEDURE — 97165 OT EVAL LOW COMPLEX 30 MIN: CPT

## 2023-06-28 PROCEDURE — 84100 ASSAY OF PHOSPHORUS: CPT

## 2023-06-28 PROCEDURE — 97129 THER IVNTJ 1ST 15 MIN: CPT

## 2023-06-28 PROCEDURE — 6370000000 HC RX 637 (ALT 250 FOR IP): Performed by: STUDENT IN AN ORGANIZED HEALTH CARE EDUCATION/TRAINING PROGRAM

## 2023-06-28 PROCEDURE — 2060000000 HC ICU INTERMEDIATE R&B

## 2023-06-28 PROCEDURE — 82947 ASSAY GLUCOSE BLOOD QUANT: CPT

## 2023-06-28 PROCEDURE — 85027 COMPLETE CBC AUTOMATED: CPT

## 2023-06-28 PROCEDURE — 83036 HEMOGLOBIN GLYCOSYLATED A1C: CPT

## 2023-06-28 PROCEDURE — 6360000002 HC RX W HCPCS: Performed by: REGISTERED NURSE

## 2023-06-28 RX ORDER — LEVETIRACETAM 500 MG/1
500 TABLET ORAL 2 TIMES DAILY
Status: DISCONTINUED | OUTPATIENT
Start: 2023-06-28 | End: 2023-06-29 | Stop reason: HOSPADM

## 2023-06-28 RX ORDER — GABAPENTIN 300 MG/1
300 CAPSULE ORAL EVERY 8 HOURS
Status: DISCONTINUED | OUTPATIENT
Start: 2023-06-28 | End: 2023-06-29

## 2023-06-28 RX ORDER — PANTOPRAZOLE SODIUM 40 MG/1
40 TABLET, DELAYED RELEASE ORAL DAILY PRN
Status: DISCONTINUED | OUTPATIENT
Start: 2023-06-28 | End: 2023-06-28

## 2023-06-28 RX ORDER — PANTOPRAZOLE SODIUM 40 MG/1
40 TABLET, DELAYED RELEASE ORAL
Status: DISCONTINUED | OUTPATIENT
Start: 2023-06-29 | End: 2023-06-29 | Stop reason: HOSPADM

## 2023-06-28 RX ORDER — LEVETIRACETAM 500 MG/1
500 TABLET ORAL 2 TIMES DAILY
Status: DISCONTINUED | OUTPATIENT
Start: 2023-06-28 | End: 2023-06-28

## 2023-06-28 RX ORDER — INSULIN LISPRO 100 [IU]/ML
0-16 INJECTION, SOLUTION INTRAVENOUS; SUBCUTANEOUS
Status: DISCONTINUED | OUTPATIENT
Start: 2023-06-28 | End: 2023-06-29 | Stop reason: HOSPADM

## 2023-06-28 RX ADMIN — OXYCODONE HYDROCHLORIDE 5 MG: 5 TABLET ORAL at 11:55

## 2023-06-28 RX ADMIN — ENOXAPARIN SODIUM 30 MG: 30 INJECTION SUBCUTANEOUS at 20:48

## 2023-06-28 RX ADMIN — PHENOBARBITAL 97.2 MG: 32.4 TABLET ORAL at 22:51

## 2023-06-28 RX ADMIN — ACETAMINOPHEN 1000 MG: 500 TABLET ORAL at 05:36

## 2023-06-28 RX ADMIN — OXYCODONE HYDROCHLORIDE 5 MG: 5 TABLET ORAL at 05:36

## 2023-06-28 RX ADMIN — PHENOBARBITAL 97.2 MG: 32.4 TABLET ORAL at 17:40

## 2023-06-28 RX ADMIN — FOLIC ACID 1 MG: 1 TABLET ORAL at 09:35

## 2023-06-28 RX ADMIN — Medication 100 MG: at 09:34

## 2023-06-28 RX ADMIN — PHENOBARBITAL 97.2 MG: 32.4 TABLET ORAL at 11:55

## 2023-06-28 RX ADMIN — SODIUM CHLORIDE, PRESERVATIVE FREE 10 ML: 5 INJECTION INTRAVENOUS at 20:49

## 2023-06-28 RX ADMIN — OLANZAPINE 15 MG: 15 TABLET, FILM COATED ORAL at 20:48

## 2023-06-28 RX ADMIN — TOPIRAMATE 100 MG: 100 TABLET, FILM COATED ORAL at 20:48

## 2023-06-28 RX ADMIN — ACETAMINOPHEN 1000 MG: 500 TABLET ORAL at 17:40

## 2023-06-28 RX ADMIN — METOPROLOL TARTRATE 25 MG: 25 TABLET ORAL at 20:48

## 2023-06-28 RX ADMIN — OXYCODONE HYDROCHLORIDE 5 MG: 5 TABLET ORAL at 20:48

## 2023-06-28 RX ADMIN — GABAPENTIN 300 MG: 300 CAPSULE ORAL at 09:34

## 2023-06-28 RX ADMIN — GABAPENTIN 300 MG: 300 CAPSULE ORAL at 17:40

## 2023-06-28 RX ADMIN — ACETAMINOPHEN 1000 MG: 500 TABLET ORAL at 20:48

## 2023-06-28 RX ADMIN — ENOXAPARIN SODIUM 30 MG: 30 INJECTION SUBCUTANEOUS at 09:35

## 2023-06-28 RX ADMIN — LEVETIRACETAM 500 MG: 500 TABLET, FILM COATED ORAL at 20:48

## 2023-06-28 RX ADMIN — SODIUM CHLORIDE, PRESERVATIVE FREE 10 ML: 5 INJECTION INTRAVENOUS at 09:36

## 2023-06-28 RX ADMIN — METOPROLOL TARTRATE 25 MG: 25 TABLET ORAL at 09:34

## 2023-06-28 RX ADMIN — LEVETIRACETAM 500 MG: 5 INJECTION, SOLUTION INTRAVENOUS at 06:07

## 2023-06-28 RX ADMIN — FLUOXETINE HYDROCHLORIDE 40 MG: 20 CAPSULE ORAL at 09:34

## 2023-06-28 RX ADMIN — INSULIN LISPRO 4 UNITS: 100 INJECTION, SOLUTION INTRAVENOUS; SUBCUTANEOUS at 17:39

## 2023-06-28 RX ADMIN — INSULIN LISPRO 4 UNITS: 100 INJECTION, SOLUTION INTRAVENOUS; SUBCUTANEOUS at 20:53

## 2023-06-28 RX ADMIN — METHOCARBAMOL TABLETS 750 MG: 750 TABLET, COATED ORAL at 09:35

## 2023-06-28 ASSESSMENT — PAIN SCALES - GENERAL
PAINLEVEL_OUTOF10: 7
PAINLEVEL_OUTOF10: 5
PAINLEVEL_OUTOF10: 8
PAINLEVEL_OUTOF10: 3
PAINLEVEL_OUTOF10: 6
PAINLEVEL_OUTOF10: 4
PAINLEVEL_OUTOF10: 8
PAINLEVEL_OUTOF10: 4

## 2023-06-28 ASSESSMENT — PAIN DESCRIPTION - DESCRIPTORS
DESCRIPTORS: ACHING;SHARP;PRESSURE
DESCRIPTORS: ACHING
DESCRIPTORS: ACHING

## 2023-06-28 ASSESSMENT — PAIN - FUNCTIONAL ASSESSMENT
PAIN_FUNCTIONAL_ASSESSMENT: PREVENTS OR INTERFERES SOME ACTIVE ACTIVITIES AND ADLS
PAIN_FUNCTIONAL_ASSESSMENT: PREVENTS OR INTERFERES WITH MANY ACTIVE NOT PASSIVE ACTIVITIES
PAIN_FUNCTIONAL_ASSESSMENT: ACTIVITIES ARE NOT PREVENTED

## 2023-06-28 ASSESSMENT — PAIN DESCRIPTION - LOCATION
LOCATION: HEAD
LOCATION: HEAD
LOCATION: EYE;HEAD

## 2023-06-28 ASSESSMENT — PAIN DESCRIPTION - ORIENTATION
ORIENTATION: RIGHT

## 2023-06-29 ENCOUNTER — APPOINTMENT (OUTPATIENT)
Dept: CT IMAGING | Age: 43
End: 2023-06-29
Payer: MEDICARE

## 2023-06-29 VITALS
HEIGHT: 68 IN | BODY MASS INDEX: 40.2 KG/M2 | SYSTOLIC BLOOD PRESSURE: 139 MMHG | TEMPERATURE: 97.9 F | RESPIRATION RATE: 16 BRPM | HEART RATE: 69 BPM | DIASTOLIC BLOOD PRESSURE: 83 MMHG | WEIGHT: 265.21 LBS | OXYGEN SATURATION: 100 %

## 2023-06-29 LAB
ANION GAP SERPL CALCULATED.3IONS-SCNC: 14 MMOL/L (ref 9–17)
BUN SERPL-MCNC: 6 MG/DL (ref 6–20)
CALCIUM SERPL-MCNC: 8 MG/DL (ref 8.6–10.4)
CHLORIDE SERPL-SCNC: 109 MMOL/L (ref 98–107)
CO2 SERPL-SCNC: 16 MMOL/L (ref 20–31)
CREAT SERPL-MCNC: 0.56 MG/DL (ref 0.5–0.9)
GFR SERPL CREATININE-BSD FRML MDRD: >60 ML/MIN/1.73M2
GLUCOSE BLD-MCNC: 133 MG/DL (ref 65–105)
GLUCOSE BLD-MCNC: 139 MG/DL (ref 65–105)
GLUCOSE BLD-MCNC: 199 MG/DL (ref 65–105)
GLUCOSE SERPL-MCNC: 127 MG/DL (ref 70–99)
MAGNESIUM SERPL-MCNC: 2 MG/DL (ref 1.6–2.6)
PHOSPHATE SERPL-MCNC: 3.7 MG/DL (ref 2.6–4.5)
POTASSIUM SERPL-SCNC: 4.8 MMOL/L (ref 3.7–5.3)
REASON FOR REJECTION: NORMAL
SODIUM SERPL-SCNC: 139 MMOL/L (ref 135–144)
SPECIMEN SOURCE: NORMAL
ZZ NTE CLEAN UP: ORDERED TEST: NORMAL

## 2023-06-29 PROCEDURE — 6370000000 HC RX 637 (ALT 250 FOR IP): Performed by: STUDENT IN AN ORGANIZED HEALTH CARE EDUCATION/TRAINING PROGRAM

## 2023-06-29 PROCEDURE — 36415 COLL VENOUS BLD VENIPUNCTURE: CPT

## 2023-06-29 PROCEDURE — 6370000000 HC RX 637 (ALT 250 FOR IP)

## 2023-06-29 PROCEDURE — 6370000000 HC RX 637 (ALT 250 FOR IP): Performed by: NURSE PRACTITIONER

## 2023-06-29 PROCEDURE — 82947 ASSAY GLUCOSE BLOOD QUANT: CPT

## 2023-06-29 PROCEDURE — 6360000002 HC RX W HCPCS: Performed by: STUDENT IN AN ORGANIZED HEALTH CARE EDUCATION/TRAINING PROGRAM

## 2023-06-29 PROCEDURE — 84100 ASSAY OF PHOSPHORUS: CPT

## 2023-06-29 PROCEDURE — 80048 BASIC METABOLIC PNL TOTAL CA: CPT

## 2023-06-29 PROCEDURE — 83735 ASSAY OF MAGNESIUM: CPT

## 2023-06-29 PROCEDURE — 70450 CT HEAD/BRAIN W/O DYE: CPT

## 2023-06-29 RX ORDER — ENOXAPARIN SODIUM 100 MG/ML
30 INJECTION SUBCUTANEOUS ONCE
Status: DISCONTINUED | OUTPATIENT
Start: 2023-06-29 | End: 2023-06-29 | Stop reason: SDUPTHER

## 2023-06-29 RX ORDER — ACETAMINOPHEN 500 MG
1000 TABLET ORAL 3 TIMES DAILY
Status: DISCONTINUED | OUTPATIENT
Start: 2023-06-29 | End: 2023-06-29 | Stop reason: HOSPADM

## 2023-06-29 RX ORDER — OXYCODONE HYDROCHLORIDE 5 MG/1
5 TABLET ORAL EVERY 8 HOURS PRN
Qty: 21 TABLET | Refills: 0 | Status: ON HOLD | OUTPATIENT
Start: 2023-06-29 | End: 2023-07-09 | Stop reason: HOSPADM

## 2023-06-29 RX ORDER — GABAPENTIN 300 MG/1
300 CAPSULE ORAL 3 TIMES DAILY
Status: DISCONTINUED | OUTPATIENT
Start: 2023-06-29 | End: 2023-06-29 | Stop reason: HOSPADM

## 2023-06-29 RX ORDER — OXYCODONE HYDROCHLORIDE 5 MG/1
5 TABLET ORAL EVERY 8 HOURS PRN
Status: DISCONTINUED | OUTPATIENT
Start: 2023-06-29 | End: 2023-06-29 | Stop reason: HOSPADM

## 2023-06-29 RX ORDER — LEVETIRACETAM 500 MG/1
500 TABLET ORAL 2 TIMES DAILY
Qty: 6 TABLET | Refills: 0 | Status: SHIPPED | OUTPATIENT
Start: 2023-06-29 | End: 2023-07-02

## 2023-06-29 RX ORDER — GABAPENTIN 300 MG/1
300 CAPSULE ORAL 3 TIMES DAILY
Qty: 21 CAPSULE | Refills: 0 | Status: SHIPPED | OUTPATIENT
Start: 2023-06-29 | End: 2023-07-06

## 2023-06-29 RX ORDER — BACITRACIN ZINC AND POLYMYXIN B SULFATE 500; 1000 [USP'U]/G; [USP'U]/G
OINTMENT TOPICAL
Qty: 20 EACH | Refills: 0 | Status: ON HOLD | OUTPATIENT
Start: 2023-06-29 | End: 2023-07-09

## 2023-06-29 RX ORDER — ENOXAPARIN SODIUM 100 MG/ML
30 INJECTION SUBCUTANEOUS 2 TIMES DAILY
Qty: 4.2 ML | Refills: 0 | Status: ON HOLD | OUTPATIENT
Start: 2023-06-29 | End: 2023-07-09

## 2023-06-29 RX ADMIN — ENOXAPARIN SODIUM 30 MG: 30 INJECTION SUBCUTANEOUS at 18:22

## 2023-06-29 RX ADMIN — FLUOXETINE HYDROCHLORIDE 40 MG: 20 CAPSULE ORAL at 08:45

## 2023-06-29 RX ADMIN — GABAPENTIN 300 MG: 300 CAPSULE ORAL at 14:45

## 2023-06-29 RX ADMIN — LEVETIRACETAM 500 MG: 500 TABLET, FILM COATED ORAL at 08:46

## 2023-06-29 RX ADMIN — PANTOPRAZOLE SODIUM 40 MG: 40 TABLET, DELAYED RELEASE ORAL at 08:45

## 2023-06-29 RX ADMIN — OXYCODONE HYDROCHLORIDE 5 MG: 5 TABLET ORAL at 11:51

## 2023-06-29 RX ADMIN — GABAPENTIN 300 MG: 300 CAPSULE ORAL at 08:45

## 2023-06-29 RX ADMIN — ACETAMINOPHEN 1000 MG: 500 TABLET ORAL at 15:46

## 2023-06-29 RX ADMIN — EMPAGLIFLOZIN 10 MG: 10 TABLET, FILM COATED ORAL at 11:46

## 2023-06-29 RX ADMIN — PHENOBARBITAL 56.7 MG: 16.2 TABLET ORAL at 17:21

## 2023-06-29 RX ADMIN — OXYCODONE HYDROCHLORIDE 5 MG: 5 TABLET ORAL at 04:32

## 2023-06-29 RX ADMIN — Medication 100 MG: at 08:45

## 2023-06-29 RX ADMIN — PHENOBARBITAL 56.7 MG: 16.2 TABLET ORAL at 10:11

## 2023-06-29 RX ADMIN — METOPROLOL TARTRATE 25 MG: 25 TABLET ORAL at 08:45

## 2023-06-29 RX ADMIN — ENOXAPARIN SODIUM 30 MG: 30 INJECTION SUBCUTANEOUS at 08:46

## 2023-06-29 RX ADMIN — FOLIC ACID 1 MG: 1 TABLET ORAL at 08:46

## 2023-06-29 ASSESSMENT — PAIN DESCRIPTION - LOCATION
LOCATION: HEAD;EYE
LOCATION: HEAD
LOCATION: HEAD;EYE
LOCATION: HEAD;EYE

## 2023-06-29 ASSESSMENT — PAIN DESCRIPTION - DESCRIPTORS
DESCRIPTORS: ACHING
DESCRIPTORS: SHOOTING
DESCRIPTORS: ACHING
DESCRIPTORS: ACHING

## 2023-06-29 ASSESSMENT — PAIN - FUNCTIONAL ASSESSMENT
PAIN_FUNCTIONAL_ASSESSMENT: PREVENTS OR INTERFERES WITH MANY ACTIVE NOT PASSIVE ACTIVITIES

## 2023-06-29 ASSESSMENT — PAIN DESCRIPTION - ORIENTATION
ORIENTATION: RIGHT
ORIENTATION: OTHER (COMMENT)
ORIENTATION: RIGHT
ORIENTATION: RIGHT

## 2023-06-29 ASSESSMENT — PAIN SCALES - GENERAL
PAINLEVEL_OUTOF10: 7
PAINLEVEL_OUTOF10: 6
PAINLEVEL_OUTOF10: 5
PAINLEVEL_OUTOF10: 5
PAINLEVEL_OUTOF10: 7
PAINLEVEL_OUTOF10: 7
PAINLEVEL_OUTOF10: 6
PAINLEVEL_OUTOF10: 7
PAINLEVEL_OUTOF10: 5
PAINLEVEL_OUTOF10: 4
PAINLEVEL_OUTOF10: 2
PAINLEVEL_OUTOF10: 7

## 2023-07-01 ENCOUNTER — APPOINTMENT (OUTPATIENT)
Dept: CT IMAGING | Age: 43
End: 2023-07-01
Payer: MEDICARE

## 2023-07-01 ENCOUNTER — HOSPITAL ENCOUNTER (EMERGENCY)
Age: 43
Discharge: HOME | End: 2023-07-02
Attending: EMERGENCY MEDICINE
Payer: MEDICARE

## 2023-07-01 DIAGNOSIS — F10.10 ALCOHOL ABUSE: ICD-10-CM

## 2023-07-01 DIAGNOSIS — R73.9 HYPERGLYCEMIA: Primary | ICD-10-CM

## 2023-07-01 LAB
ALBUMIN SERPL-MCNC: 2.7 G/DL (ref 3.5–5.2)
ALBUMIN/GLOB SERPL: 0.9 {RATIO} (ref 1–2.5)
ALP SERPL-CCNC: 224 U/L (ref 35–104)
ALT SERPL-CCNC: 209 U/L (ref 5–33)
ANION GAP SERPL CALCULATED.3IONS-SCNC: 15 MMOL/L (ref 9–17)
AST SERPL-CCNC: 50 U/L
B-OH-BUTYR SERPL-MCNC: 0.1 MMOL/L (ref 0.02–0.27)
BASOPHILS # BLD: <0.03 K/UL (ref 0–0.2)
BASOPHILS NFR BLD: 1 % (ref 0–2)
BILIRUB SERPL-MCNC: 0.3 MG/DL (ref 0.3–1.2)
BUN SERPL-MCNC: 4 MG/DL (ref 6–20)
CALCIUM SERPL-MCNC: 8 MG/DL (ref 8.6–10.4)
CHLORIDE SERPL-SCNC: 111 MMOL/L (ref 98–107)
CO2 SERPL-SCNC: 23 MMOL/L (ref 20–31)
CREAT SERPL-MCNC: 0.61 MG/DL (ref 0.5–0.9)
EOSINOPHIL # BLD: <0.03 K/UL (ref 0–0.44)
EOSINOPHILS RELATIVE PERCENT: 1 % (ref 1–4)
ERYTHROCYTE [DISTWIDTH] IN BLOOD BY AUTOMATED COUNT: 13.2 % (ref 11.8–14.4)
ETHANOL PERCENT: 0.38 %
ETHANOLAMINE SERPL-MCNC: 381 MG/DL
GFR SERPL CREATININE-BSD FRML MDRD: >60 ML/MIN/1.73M2
GLUCOSE BLD-MCNC: 314 MG/DL (ref 65–105)
GLUCOSE SERPL-MCNC: 333 MG/DL (ref 70–99)
HCT VFR BLD AUTO: 40.3 % (ref 36.3–47.1)
HGB BLD-MCNC: 13.2 G/DL (ref 11.9–15.1)
IMM GRANULOCYTES # BLD AUTO: <0.03 K/UL (ref 0–0.3)
IMM GRANULOCYTES NFR BLD: 0 %
LEVETIRACETAM SERPL-MCNC: <2 UG/ML
LYMPHOCYTES # BLD: 60 % (ref 24–43)
LYMPHOCYTES NFR BLD: 2.41 K/UL (ref 1.1–3.7)
MCH RBC QN AUTO: 30.7 PG (ref 25.2–33.5)
MCHC RBC AUTO-ENTMCNC: 32.8 G/DL (ref 28.4–34.8)
MCV RBC AUTO: 93.7 FL (ref 82.6–102.9)
MONOCYTES NFR BLD: 0.36 K/UL (ref 0.1–1.2)
MONOCYTES NFR BLD: 9 % (ref 3–12)
NEUTROPHILS NFR BLD: 30 % (ref 36–65)
NEUTS SEG NFR BLD: 1.23 K/UL (ref 1.5–8.1)
NRBC BLD-RTO: 0 PER 100 WBC
PLATELET # BLD AUTO: 195 K/UL (ref 138–453)
PMV BLD AUTO: 11.4 FL (ref 8.1–13.5)
POTASSIUM SERPL-SCNC: 3.7 MMOL/L (ref 3.7–5.3)
PROT SERPL-MCNC: 5.6 G/DL (ref 6.4–8.3)
RBC # BLD AUTO: 4.3 M/UL (ref 3.95–5.11)
REASON FOR REJECTION: NORMAL
SODIUM SERPL-SCNC: 149 MMOL/L (ref 135–144)
SPECIMEN SOURCE: NORMAL
WBC OTHER # BLD: 4.1 K/UL (ref 3.5–11.3)
ZZ NTE CLEAN UP: ORDERED TEST: NORMAL

## 2023-07-01 PROCEDURE — 99284 EMERGENCY DEPT VISIT MOD MDM: CPT

## 2023-07-01 PROCEDURE — G0480 DRUG TEST DEF 1-7 CLASSES: HCPCS

## 2023-07-01 PROCEDURE — 72125 CT NECK SPINE W/O DYE: CPT

## 2023-07-01 PROCEDURE — 85027 COMPLETE CBC AUTOMATED: CPT

## 2023-07-01 PROCEDURE — 82010 KETONE BODYS QUAN: CPT

## 2023-07-01 PROCEDURE — 70450 CT HEAD/BRAIN W/O DYE: CPT

## 2023-07-01 PROCEDURE — 2580000003 HC RX 258

## 2023-07-01 PROCEDURE — 80177 DRUG SCRN QUAN LEVETIRACETAM: CPT

## 2023-07-01 PROCEDURE — 81003 URINALYSIS AUTO W/O SCOPE: CPT

## 2023-07-01 PROCEDURE — 82947 ASSAY GLUCOSE BLOOD QUANT: CPT

## 2023-07-01 PROCEDURE — 80053 COMPREHEN METABOLIC PANEL: CPT

## 2023-07-01 RX ORDER — 0.9 % SODIUM CHLORIDE 0.9 %
1000 INTRAVENOUS SOLUTION INTRAVENOUS ONCE
Status: COMPLETED | OUTPATIENT
Start: 2023-07-01 | End: 2023-07-02

## 2023-07-01 RX ADMIN — SODIUM CHLORIDE 1000 ML: 9 INJECTION, SOLUTION INTRAVENOUS at 22:36

## 2023-07-02 VITALS
TEMPERATURE: 97.9 F | DIASTOLIC BLOOD PRESSURE: 84 MMHG | SYSTOLIC BLOOD PRESSURE: 108 MMHG | RESPIRATION RATE: 18 BRPM | OXYGEN SATURATION: 100 % | HEART RATE: 97 BPM

## 2023-07-02 LAB
BILIRUB UR QL STRIP: NEGATIVE
CLARITY UR: CLEAR
COLOR UR: YELLOW
COMMENT UA: ABNORMAL
GLUCOSE BLD-MCNC: 210 MG/DL
GLUCOSE BLD-MCNC: 210 MG/DL (ref 65–105)
GLUCOSE UR STRIP-MCNC: ABNORMAL MG/DL
HGB UR QL STRIP.AUTO: NEGATIVE
KETONES UR STRIP-MCNC: NEGATIVE MG/DL
LEUKOCYTE ESTERASE UR QL STRIP: NEGATIVE
NITRITE UR QL STRIP: NEGATIVE
PH UR STRIP: 5.5 [PH] (ref 5–8)
PROT UR STRIP-MCNC: NEGATIVE MG/DL
SP GR UR STRIP: 1.01 (ref 1–1.03)
UROBILINOGEN UR STRIP-ACNC: NORMAL

## 2023-07-02 PROCEDURE — 82947 ASSAY GLUCOSE BLOOD QUANT: CPT

## 2023-07-02 ASSESSMENT — ENCOUNTER SYMPTOMS: SHORTNESS OF BREATH: 0

## 2023-07-05 ENCOUNTER — HOSPITAL ENCOUNTER (INPATIENT)
Age: 43
LOS: 3 days | Discharge: HOME | DRG: 309 | End: 2023-07-09
Attending: EMERGENCY MEDICINE | Admitting: STUDENT IN AN ORGANIZED HEALTH CARE EDUCATION/TRAINING PROGRAM
Payer: MEDICARE

## 2023-07-05 ENCOUNTER — APPOINTMENT (OUTPATIENT)
Dept: GENERAL RADIOLOGY | Age: 43
DRG: 309 | End: 2023-07-05
Payer: MEDICARE

## 2023-07-05 ENCOUNTER — APPOINTMENT (OUTPATIENT)
Dept: CT IMAGING | Age: 43
DRG: 309 | End: 2023-07-05
Payer: MEDICARE

## 2023-07-05 DIAGNOSIS — R29.6 FREQUENT FALLS: Primary | ICD-10-CM

## 2023-07-05 DIAGNOSIS — R55 SYNCOPE AND COLLAPSE DETERMINED BY EXAMINATION: ICD-10-CM

## 2023-07-05 DIAGNOSIS — R73.9 HYPERGLYCEMIA: ICD-10-CM

## 2023-07-05 DIAGNOSIS — M54.6 MIDLINE THORACIC BACK PAIN, UNSPECIFIED CHRONICITY: ICD-10-CM

## 2023-07-05 DIAGNOSIS — I60.9 SAH (SUBARACHNOID HEMORRHAGE) (HCC): ICD-10-CM

## 2023-07-05 LAB
ALBUMIN SERPL-MCNC: 2.6 G/DL (ref 3.5–5.2)
ALBUMIN/GLOB SERPL: 0.9 {RATIO} (ref 1–2.5)
ALP SERPL-CCNC: 228 U/L (ref 35–104)
ALT SERPL-CCNC: 111 U/L (ref 5–33)
ANION GAP SERPL CALCULATED.3IONS-SCNC: 17 MMOL/L (ref 9–17)
AST SERPL-CCNC: 43 U/L
B-OH-BUTYR SERPL-MCNC: 0.15 MMOL/L (ref 0.02–0.27)
BASOPHILS # BLD: 0.04 K/UL (ref 0–0.2)
BASOPHILS NFR BLD: 1 % (ref 0–2)
BILIRUB SERPL-MCNC: 0.5 MG/DL (ref 0.3–1.2)
BILIRUB UR QL STRIP: NEGATIVE
BUN SERPL-MCNC: 5 MG/DL (ref 6–20)
CALCIUM SERPL-MCNC: 7.9 MG/DL (ref 8.6–10.4)
CHLORIDE SERPL-SCNC: 88 MMOL/L (ref 98–107)
CLARITY UR: CLEAR
CO2 SERPL-SCNC: 21 MMOL/L (ref 20–31)
COLOR UR: YELLOW
COMMENT UA: ABNORMAL
CREAT SERPL-MCNC: 0.67 MG/DL (ref 0.5–0.9)
EOSINOPHIL # BLD: 0.07 K/UL (ref 0–0.44)
EOSINOPHILS RELATIVE PERCENT: 2 % (ref 1–4)
ERYTHROCYTE [DISTWIDTH] IN BLOOD BY AUTOMATED COUNT: 12.9 % (ref 11.8–14.4)
GFR SERPL CREATININE-BSD FRML MDRD: >60 ML/MIN/1.73M2
GLUCOSE BLD-MCNC: 392 MG/DL (ref 65–105)
GLUCOSE BLD-MCNC: 397 MG/DL (ref 65–105)
GLUCOSE SERPL-MCNC: 524 MG/DL (ref 70–99)
GLUCOSE UR STRIP-MCNC: ABNORMAL MG/DL
HCG SERPL QL: NEGATIVE
HCT VFR BLD AUTO: 34.9 % (ref 36.3–47.1)
HGB BLD-MCNC: 11.5 G/DL (ref 11.9–15.1)
HGB UR QL STRIP.AUTO: NEGATIVE
IMM GRANULOCYTES # BLD AUTO: <0.03 K/UL (ref 0–0.3)
IMM GRANULOCYTES NFR BLD: 0 %
KETONES UR STRIP-MCNC: NEGATIVE MG/DL
LEUKOCYTE ESTERASE UR QL STRIP: NEGATIVE
LYMPHOCYTES # BLD: 35 % (ref 24–43)
LYMPHOCYTES NFR BLD: 1.27 K/UL (ref 1.1–3.7)
MAGNESIUM SERPL-MCNC: 1.7 MG/DL (ref 1.6–2.6)
MCH RBC QN AUTO: 30.8 PG (ref 25.2–33.5)
MCHC RBC AUTO-ENTMCNC: 33 G/DL (ref 28.4–34.8)
MCV RBC AUTO: 93.6 FL (ref 82.6–102.9)
MONOCYTES NFR BLD: 0.38 K/UL (ref 0.1–1.2)
MONOCYTES NFR BLD: 11 % (ref 3–12)
NEUTROPHILS NFR BLD: 51 % (ref 36–65)
NEUTS SEG NFR BLD: 1.84 K/UL (ref 1.5–8.1)
NITRITE UR QL STRIP: NEGATIVE
NRBC BLD-RTO: 0 PER 100 WBC
PH UR STRIP: 6.5 [PH] (ref 5–8)
PLATELET # BLD AUTO: ABNORMAL K/UL (ref 138–453)
PLATELET, FLUORESCENCE: 171 K/UL (ref 138–453)
PLATELETS.RETICULATED NFR BLD AUTO: 11.5 % (ref 1.1–10.3)
POTASSIUM SERPL-SCNC: 4.7 MMOL/L (ref 3.7–5.3)
PROT SERPL-MCNC: 5.5 G/DL (ref 6.4–8.3)
PROT UR STRIP-MCNC: NEGATIVE MG/DL
RBC # BLD AUTO: 3.73 M/UL (ref 3.95–5.11)
SODIUM SERPL-SCNC: 126 MMOL/L (ref 135–144)
SP GR UR STRIP: 1.01 (ref 1–1.03)
TROPONIN I SERPL HS-MCNC: 9 NG/L (ref 0–14)
UROBILINOGEN UR STRIP-ACNC: NORMAL
WBC OTHER # BLD: 3.6 K/UL (ref 3.5–11.3)

## 2023-07-05 PROCEDURE — 82947 ASSAY GLUCOSE BLOOD QUANT: CPT

## 2023-07-05 PROCEDURE — 84703 CHORIONIC GONADOTROPIN ASSAY: CPT

## 2023-07-05 PROCEDURE — G0378 HOSPITAL OBSERVATION PER HR: HCPCS

## 2023-07-05 PROCEDURE — 73502 X-RAY EXAM HIP UNI 2-3 VIEWS: CPT

## 2023-07-05 PROCEDURE — 6360000002 HC RX W HCPCS

## 2023-07-05 PROCEDURE — 96365 THER/PROPH/DIAG IV INF INIT: CPT

## 2023-07-05 PROCEDURE — 99285 EMERGENCY DEPT VISIT HI MDM: CPT

## 2023-07-05 PROCEDURE — 82010 KETONE BODYS QUAN: CPT

## 2023-07-05 PROCEDURE — 6370000000 HC RX 637 (ALT 250 FOR IP)

## 2023-07-05 PROCEDURE — 85027 COMPLETE CBC AUTOMATED: CPT

## 2023-07-05 PROCEDURE — 86403 PARTICLE AGGLUT ANTBDY SCRN: CPT

## 2023-07-05 PROCEDURE — 96366 THER/PROPH/DIAG IV INF ADDON: CPT

## 2023-07-05 PROCEDURE — 85055 RETICULATED PLATELET ASSAY: CPT

## 2023-07-05 PROCEDURE — 83735 ASSAY OF MAGNESIUM: CPT

## 2023-07-05 PROCEDURE — 81003 URINALYSIS AUTO W/O SCOPE: CPT

## 2023-07-05 PROCEDURE — 87086 URINE CULTURE/COLONY COUNT: CPT

## 2023-07-05 PROCEDURE — 84484 ASSAY OF TROPONIN QUANT: CPT

## 2023-07-05 PROCEDURE — 71045 X-RAY EXAM CHEST 1 VIEW: CPT

## 2023-07-05 PROCEDURE — 80053 COMPREHEN METABOLIC PANEL: CPT

## 2023-07-05 PROCEDURE — 73562 X-RAY EXAM OF KNEE 3: CPT

## 2023-07-05 PROCEDURE — 99223 1ST HOSP IP/OBS HIGH 75: CPT | Performed by: INTERNAL MEDICINE

## 2023-07-05 PROCEDURE — 93005 ELECTROCARDIOGRAM TRACING: CPT

## 2023-07-05 PROCEDURE — 72131 CT LUMBAR SPINE W/O DYE: CPT

## 2023-07-05 PROCEDURE — 70450 CT HEAD/BRAIN W/O DYE: CPT

## 2023-07-05 PROCEDURE — 96372 THER/PROPH/DIAG INJ SC/IM: CPT

## 2023-07-05 PROCEDURE — 72125 CT NECK SPINE W/O DYE: CPT

## 2023-07-05 PROCEDURE — 2580000003 HC RX 258

## 2023-07-05 PROCEDURE — 72128 CT CHEST SPINE W/O DYE: CPT

## 2023-07-05 RX ORDER — 0.9 % SODIUM CHLORIDE 0.9 %
1000 INTRAVENOUS SOLUTION INTRAVENOUS ONCE
Status: COMPLETED | OUTPATIENT
Start: 2023-07-05 | End: 2023-07-05

## 2023-07-05 RX ORDER — LORAZEPAM 2 MG/1
2 TABLET ORAL
Status: DISCONTINUED | OUTPATIENT
Start: 2023-07-05 | End: 2023-07-09 | Stop reason: HOSPADM

## 2023-07-05 RX ORDER — IBUPROFEN 800 MG/1
800 TABLET ORAL ONCE
Status: COMPLETED | OUTPATIENT
Start: 2023-07-05 | End: 2023-07-05

## 2023-07-05 RX ORDER — DEXTROSE MONOHYDRATE 100 MG/ML
INJECTION, SOLUTION INTRAVENOUS CONTINUOUS PRN
Status: DISCONTINUED | OUTPATIENT
Start: 2023-07-05 | End: 2023-07-09 | Stop reason: HOSPADM

## 2023-07-05 RX ORDER — ONDANSETRON 4 MG/1
4 TABLET, ORALLY DISINTEGRATING ORAL EVERY 8 HOURS PRN
Status: DISCONTINUED | OUTPATIENT
Start: 2023-07-05 | End: 2023-07-09 | Stop reason: HOSPADM

## 2023-07-05 RX ORDER — FLUOXETINE HYDROCHLORIDE 20 MG/1
40 CAPSULE ORAL DAILY
Status: DISCONTINUED | OUTPATIENT
Start: 2023-07-06 | End: 2023-07-09 | Stop reason: HOSPADM

## 2023-07-05 RX ORDER — MULTIVITAMIN WITH IRON
1 TABLET ORAL DAILY
Status: DISCONTINUED | OUTPATIENT
Start: 2023-07-06 | End: 2023-07-09 | Stop reason: HOSPADM

## 2023-07-05 RX ORDER — ACETAMINOPHEN 325 MG/1
650 TABLET ORAL EVERY 6 HOURS PRN
Status: DISCONTINUED | OUTPATIENT
Start: 2023-07-05 | End: 2023-07-09 | Stop reason: HOSPADM

## 2023-07-05 RX ORDER — INSULIN GLARGINE 100 [IU]/ML
8 INJECTION, SOLUTION SUBCUTANEOUS ONCE
Status: DISCONTINUED | OUTPATIENT
Start: 2023-07-05 | End: 2023-07-06

## 2023-07-05 RX ORDER — ACETAMINOPHEN 650 MG/1
650 SUPPOSITORY RECTAL EVERY 6 HOURS PRN
Status: DISCONTINUED | OUTPATIENT
Start: 2023-07-05 | End: 2023-07-09 | Stop reason: HOSPADM

## 2023-07-05 RX ORDER — PANTOPRAZOLE SODIUM 40 MG/1
40 TABLET, DELAYED RELEASE ORAL DAILY PRN
Status: DISCONTINUED | OUTPATIENT
Start: 2023-07-05 | End: 2023-07-09 | Stop reason: HOSPADM

## 2023-07-05 RX ORDER — ONDANSETRON 4 MG/1
4 TABLET, ORALLY DISINTEGRATING ORAL EVERY 8 HOURS PRN
Status: DISCONTINUED | OUTPATIENT
Start: 2023-07-05 | End: 2023-07-05 | Stop reason: SDUPTHER

## 2023-07-05 RX ORDER — INSULIN LISPRO 100 [IU]/ML
10 INJECTION, SOLUTION INTRAVENOUS; SUBCUTANEOUS ONCE
Status: COMPLETED | OUTPATIENT
Start: 2023-07-05 | End: 2023-07-05

## 2023-07-05 RX ORDER — LORAZEPAM 2 MG/1
4 TABLET ORAL
Status: DISCONTINUED | OUTPATIENT
Start: 2023-07-05 | End: 2023-07-09 | Stop reason: HOSPADM

## 2023-07-05 RX ORDER — POLYETHYLENE GLYCOL 3350 17 G/17G
17 POWDER, FOR SOLUTION ORAL DAILY PRN
Status: DISCONTINUED | OUTPATIENT
Start: 2023-07-05 | End: 2023-07-09 | Stop reason: HOSPADM

## 2023-07-05 RX ORDER — MAGNESIUM SULFATE 1 G/100ML
1000 INJECTION INTRAVENOUS PRN
Status: DISCONTINUED | OUTPATIENT
Start: 2023-07-05 | End: 2023-07-09 | Stop reason: HOSPADM

## 2023-07-05 RX ORDER — SODIUM CHLORIDE 9 MG/ML
INJECTION, SOLUTION INTRAVENOUS PRN
Status: DISCONTINUED | OUTPATIENT
Start: 2023-07-05 | End: 2023-07-09 | Stop reason: HOSPADM

## 2023-07-05 RX ORDER — LORAZEPAM 2 MG/ML
2 INJECTION INTRAMUSCULAR
Status: DISCONTINUED | OUTPATIENT
Start: 2023-07-05 | End: 2023-07-09 | Stop reason: HOSPADM

## 2023-07-05 RX ORDER — ONDANSETRON 2 MG/ML
4 INJECTION INTRAMUSCULAR; INTRAVENOUS EVERY 6 HOURS PRN
Status: DISCONTINUED | OUTPATIENT
Start: 2023-07-05 | End: 2023-07-09 | Stop reason: HOSPADM

## 2023-07-05 RX ORDER — SODIUM CHLORIDE 0.9 % (FLUSH) 0.9 %
10 SYRINGE (ML) INJECTION PRN
Status: DISCONTINUED | OUTPATIENT
Start: 2023-07-05 | End: 2023-07-09 | Stop reason: HOSPADM

## 2023-07-05 RX ORDER — OLANZAPINE 15 MG/1
15 TABLET ORAL NIGHTLY
Status: DISCONTINUED | OUTPATIENT
Start: 2023-07-06 | End: 2023-07-09 | Stop reason: HOSPADM

## 2023-07-05 RX ORDER — FOLIC ACID 1 MG/1
1 TABLET ORAL DAILY
Status: DISCONTINUED | OUTPATIENT
Start: 2023-07-06 | End: 2023-07-06

## 2023-07-05 RX ORDER — MAGNESIUM SULFATE IN WATER 40 MG/ML
2000 INJECTION, SOLUTION INTRAVENOUS ONCE
Status: COMPLETED | OUTPATIENT
Start: 2023-07-05 | End: 2023-07-05

## 2023-07-05 RX ORDER — SODIUM CHLORIDE 9 MG/ML
INJECTION, SOLUTION INTRAVENOUS CONTINUOUS
Status: DISCONTINUED | OUTPATIENT
Start: 2023-07-06 | End: 2023-07-06

## 2023-07-05 RX ORDER — TOPIRAMATE 100 MG/1
100 TABLET, FILM COATED ORAL NIGHTLY
Status: DISCONTINUED | OUTPATIENT
Start: 2023-07-05 | End: 2023-07-06

## 2023-07-05 RX ORDER — POTASSIUM CHLORIDE 7.45 MG/ML
10 INJECTION INTRAVENOUS PRN
Status: DISCONTINUED | OUTPATIENT
Start: 2023-07-05 | End: 2023-07-09 | Stop reason: HOSPADM

## 2023-07-05 RX ORDER — INSULIN LISPRO 100 [IU]/ML
0-4 INJECTION, SOLUTION INTRAVENOUS; SUBCUTANEOUS NIGHTLY
Status: DISCONTINUED | OUTPATIENT
Start: 2023-07-06 | End: 2023-07-09 | Stop reason: HOSPADM

## 2023-07-05 RX ORDER — LANOLIN ALCOHOL/MO/W.PET/CERES
100 CREAM (GRAM) TOPICAL DAILY
Status: DISCONTINUED | OUTPATIENT
Start: 2023-07-06 | End: 2023-07-06

## 2023-07-05 RX ORDER — BENZTROPINE MESYLATE 1 MG/1
1 TABLET ORAL 2 TIMES DAILY
Status: DISCONTINUED | OUTPATIENT
Start: 2023-07-06 | End: 2023-07-09 | Stop reason: HOSPADM

## 2023-07-05 RX ORDER — PRAVASTATIN SODIUM 20 MG
40 TABLET ORAL NIGHTLY
Status: DISCONTINUED | OUTPATIENT
Start: 2023-07-06 | End: 2023-07-09 | Stop reason: HOSPADM

## 2023-07-05 RX ORDER — SODIUM CHLORIDE 0.9 % (FLUSH) 0.9 %
5-40 SYRINGE (ML) INJECTION EVERY 12 HOURS SCHEDULED
Status: DISCONTINUED | OUTPATIENT
Start: 2023-07-06 | End: 2023-07-09 | Stop reason: HOSPADM

## 2023-07-05 RX ORDER — LEVETIRACETAM 500 MG/1
500 TABLET ORAL 2 TIMES DAILY
Status: DISCONTINUED | OUTPATIENT
Start: 2023-07-06 | End: 2023-07-09 | Stop reason: HOSPADM

## 2023-07-05 RX ORDER — INSULIN LISPRO 100 [IU]/ML
0-8 INJECTION, SOLUTION INTRAVENOUS; SUBCUTANEOUS
Status: DISCONTINUED | OUTPATIENT
Start: 2023-07-06 | End: 2023-07-09 | Stop reason: HOSPADM

## 2023-07-05 RX ORDER — LORAZEPAM 2 MG/ML
4 INJECTION INTRAMUSCULAR
Status: DISCONTINUED | OUTPATIENT
Start: 2023-07-05 | End: 2023-07-09 | Stop reason: HOSPADM

## 2023-07-05 RX ORDER — LORAZEPAM 2 MG/ML
3 INJECTION INTRAMUSCULAR
Status: DISCONTINUED | OUTPATIENT
Start: 2023-07-05 | End: 2023-07-09 | Stop reason: HOSPADM

## 2023-07-05 RX ORDER — LORAZEPAM 2 MG/ML
1 INJECTION INTRAMUSCULAR
Status: DISCONTINUED | OUTPATIENT
Start: 2023-07-05 | End: 2023-07-09 | Stop reason: HOSPADM

## 2023-07-05 RX ORDER — LORAZEPAM 1 MG/1
1 TABLET ORAL
Status: DISCONTINUED | OUTPATIENT
Start: 2023-07-05 | End: 2023-07-09 | Stop reason: HOSPADM

## 2023-07-05 RX ORDER — ENOXAPARIN SODIUM 100 MG/ML
30 INJECTION SUBCUTANEOUS 2 TIMES DAILY
Status: DISCONTINUED | OUTPATIENT
Start: 2023-07-06 | End: 2023-07-09 | Stop reason: HOSPADM

## 2023-07-05 RX ORDER — BUMETANIDE 1 MG/1
1 TABLET ORAL DAILY
Status: DISCONTINUED | OUTPATIENT
Start: 2023-07-06 | End: 2023-07-09 | Stop reason: HOSPADM

## 2023-07-05 RX ORDER — LORAZEPAM 2 MG/ML
2 INJECTION INTRAMUSCULAR EVERY 5 MIN PRN
Status: DISCONTINUED | OUTPATIENT
Start: 2023-07-05 | End: 2023-07-09 | Stop reason: HOSPADM

## 2023-07-05 RX ORDER — ACETAMINOPHEN 500 MG
1000 TABLET ORAL ONCE
Status: COMPLETED | OUTPATIENT
Start: 2023-07-05 | End: 2023-07-05

## 2023-07-05 RX ORDER — MAGNESIUM SULFATE IN WATER 40 MG/ML
2000 INJECTION, SOLUTION INTRAVENOUS ONCE
Status: DISCONTINUED | OUTPATIENT
Start: 2023-07-05 | End: 2023-07-06

## 2023-07-05 RX ORDER — GABAPENTIN 300 MG/1
300 CAPSULE ORAL 3 TIMES DAILY
Status: DISCONTINUED | OUTPATIENT
Start: 2023-07-06 | End: 2023-07-09 | Stop reason: HOSPADM

## 2023-07-05 RX ORDER — POTASSIUM CHLORIDE 20 MEQ/1
40 TABLET, EXTENDED RELEASE ORAL PRN
Status: DISCONTINUED | OUTPATIENT
Start: 2023-07-05 | End: 2023-07-09 | Stop reason: HOSPADM

## 2023-07-05 RX ADMIN — SODIUM CHLORIDE 1000 ML: 9 INJECTION, SOLUTION INTRAVENOUS at 19:57

## 2023-07-05 RX ADMIN — ACETAMINOPHEN 1000 MG: 500 TABLET ORAL at 19:54

## 2023-07-05 RX ADMIN — MAGNESIUM SULFATE HEPTAHYDRATE 2000 MG: 40 INJECTION, SOLUTION INTRAVENOUS at 20:03

## 2023-07-05 RX ADMIN — ONDANSETRON 4 MG: 4 TABLET, ORALLY DISINTEGRATING ORAL at 19:55

## 2023-07-05 RX ADMIN — INSULIN LISPRO 10 UNITS: 100 INJECTION, SOLUTION INTRAVENOUS; SUBCUTANEOUS at 21:02

## 2023-07-05 RX ADMIN — IBUPROFEN 800 MG: 800 TABLET ORAL at 21:31

## 2023-07-05 ASSESSMENT — PAIN SCALES - GENERAL
PAINLEVEL_OUTOF10: 8
PAINLEVEL_OUTOF10: 8

## 2023-07-05 ASSESSMENT — PAIN DESCRIPTION - LOCATION: LOCATION: NECK

## 2023-07-06 ENCOUNTER — TELEPHONE (OUTPATIENT)
Dept: NEUROSURGERY | Age: 43
End: 2023-07-06

## 2023-07-06 PROBLEM — R55 SYNCOPE AND COLLAPSE DETERMINED BY EXAMINATION: Status: ACTIVE | Noted: 2023-07-06

## 2023-07-06 LAB
ANION GAP SERPL CALCULATED.3IONS-SCNC: 8 MMOL/L (ref 9–17)
ANION GAP SERPL CALCULATED.3IONS-SCNC: 9 MMOL/L (ref 9–17)
BASOPHILS # BLD: 0.03 K/UL (ref 0–0.2)
BASOPHILS NFR BLD: 1 % (ref 0–2)
BNP SERPL-MCNC: 318 PG/ML
BUN SERPL-MCNC: 5 MG/DL (ref 6–20)
BUN SERPL-MCNC: 5 MG/DL (ref 6–20)
CALCIUM SERPL-MCNC: 7.7 MG/DL (ref 8.6–10.4)
CALCIUM SERPL-MCNC: 8 MG/DL (ref 8.6–10.4)
CHLORIDE SERPL-SCNC: 101 MMOL/L (ref 98–107)
CHLORIDE SERPL-SCNC: 96 MMOL/L (ref 98–107)
CHP ED QC CHECK: YES
CHP ED QC CHECK: YES
CO2 SERPL-SCNC: 26 MMOL/L (ref 20–31)
CO2 SERPL-SCNC: 27 MMOL/L (ref 20–31)
CORTIS SERPL-MCNC: 3.3 UG/DL (ref 2.7–18.4)
CREAT SERPL-MCNC: 0.57 MG/DL (ref 0.5–0.9)
CREAT SERPL-MCNC: 0.67 MG/DL (ref 0.5–0.9)
CRP SERPL HS-MCNC: 9.4 MG/L (ref 0–5)
EOSINOPHIL # BLD: 0.08 K/UL (ref 0–0.44)
EOSINOPHILS RELATIVE PERCENT: 2 % (ref 1–4)
ERYTHROCYTE [DISTWIDTH] IN BLOOD BY AUTOMATED COUNT: 12.7 % (ref 11.8–14.4)
ERYTHROCYTE [SEDIMENTATION RATE] IN BLOOD BY WESTERGREN METHOD: 3 MM/HR (ref 0–20)
EST. AVERAGE GLUCOSE BLD GHB EST-MCNC: 174 MG/DL
ETHANOL PERCENT: <0.01 %
ETHANOLAMINE SERPL-MCNC: <10 MG/DL
GFR SERPL CREATININE-BSD FRML MDRD: >60 ML/MIN/1.73M2
GFR SERPL CREATININE-BSD FRML MDRD: >60 ML/MIN/1.73M2
GLUCOSE BLD-MCNC: 162 MG/DL (ref 65–105)
GLUCOSE BLD-MCNC: 203 MG/DL (ref 65–105)
GLUCOSE BLD-MCNC: 204 MG/DL (ref 65–105)
GLUCOSE BLD-MCNC: 223 MG/DL
GLUCOSE BLD-MCNC: 223 MG/DL (ref 65–105)
GLUCOSE BLD-MCNC: 234 MG/DL (ref 65–105)
GLUCOSE BLD-MCNC: 242 MG/DL (ref 65–105)
GLUCOSE BLD-MCNC: 262 MG/DL
GLUCOSE BLD-MCNC: 262 MG/DL (ref 65–105)
GLUCOSE BLD-MCNC: 274 MG/DL
GLUCOSE BLD-MCNC: 274 MG/DL (ref 65–105)
GLUCOSE SERPL-MCNC: 264 MG/DL (ref 70–99)
GLUCOSE SERPL-MCNC: 297 MG/DL (ref 70–99)
HBA1C MFR BLD: 7.7 % (ref 4–6)
HCT VFR BLD AUTO: 35 % (ref 36.3–47.1)
HGB BLD-MCNC: 11.7 G/DL (ref 11.9–15.1)
IMM GRANULOCYTES # BLD AUTO: <0.03 K/UL (ref 0–0.3)
IMM GRANULOCYTES NFR BLD: 0 %
INR PPP: 1.2
LACTIC ACID, WHOLE BLOOD: 5 MMOL/L (ref 0.7–2.1)
LYMPHOCYTES # BLD: 44 % (ref 24–43)
LYMPHOCYTES NFR BLD: 1.64 K/UL (ref 1.1–3.7)
MCH RBC QN AUTO: 31.5 PG (ref 25.2–33.5)
MCHC RBC AUTO-ENTMCNC: 33.4 G/DL (ref 28.4–34.8)
MCV RBC AUTO: 94.1 FL (ref 82.6–102.9)
MICROORGANISM SPEC CULT: ABNORMAL
MONOCYTES NFR BLD: 0.29 K/UL (ref 0.1–1.2)
MONOCYTES NFR BLD: 8 % (ref 3–12)
NEUTROPHILS NFR BLD: 45 % (ref 36–65)
NEUTS SEG NFR BLD: 1.67 K/UL (ref 1.5–8.1)
NRBC BLD-RTO: 0 PER 100 WBC
PLATELET # BLD AUTO: 173 K/UL (ref 138–453)
PMV BLD AUTO: 12.8 FL (ref 8.1–13.5)
POTASSIUM SERPL-SCNC: 3.7 MMOL/L (ref 3.7–5.3)
POTASSIUM SERPL-SCNC: 3.8 MMOL/L (ref 3.7–5.3)
PROTHROMBIN TIME: 14.6 SEC (ref 11.7–14.9)
RBC # BLD AUTO: 3.72 M/UL (ref 3.95–5.11)
SODIUM SERPL-SCNC: 131 MMOL/L (ref 135–144)
SODIUM SERPL-SCNC: 136 MMOL/L (ref 135–144)
SPECIMEN DESCRIPTION: ABNORMAL
TSH SERPL DL<=0.05 MIU/L-ACNC: 1.27 UIU/ML (ref 0.3–5)
WBC OTHER # BLD: 3.7 K/UL (ref 3.5–11.3)

## 2023-07-06 PROCEDURE — 6370000000 HC RX 637 (ALT 250 FOR IP): Performed by: INTERNAL MEDICINE

## 2023-07-06 PROCEDURE — 99232 SBSQ HOSP IP/OBS MODERATE 35: CPT | Performed by: FAMILY MEDICINE

## 2023-07-06 PROCEDURE — 1200000000 HC SEMI PRIVATE

## 2023-07-06 PROCEDURE — 85610 PROTHROMBIN TIME: CPT

## 2023-07-06 PROCEDURE — 80048 BASIC METABOLIC PNL TOTAL CA: CPT

## 2023-07-06 PROCEDURE — 6360000002 HC RX W HCPCS: Performed by: INTERNAL MEDICINE

## 2023-07-06 PROCEDURE — G0480 DRUG TEST DEF 1-7 CLASSES: HCPCS

## 2023-07-06 PROCEDURE — 6360000002 HC RX W HCPCS: Performed by: NURSE PRACTITIONER

## 2023-07-06 PROCEDURE — 96361 HYDRATE IV INFUSION ADD-ON: CPT

## 2023-07-06 PROCEDURE — 83605 ASSAY OF LACTIC ACID: CPT

## 2023-07-06 PROCEDURE — 6370000000 HC RX 637 (ALT 250 FOR IP): Performed by: NURSE PRACTITIONER

## 2023-07-06 PROCEDURE — 2580000003 HC RX 258: Performed by: INTERNAL MEDICINE

## 2023-07-06 PROCEDURE — 85027 COMPLETE CBC AUTOMATED: CPT

## 2023-07-06 PROCEDURE — 83036 HEMOGLOBIN GLYCOSYLATED A1C: CPT

## 2023-07-06 PROCEDURE — 2580000003 HC RX 258: Performed by: NURSE PRACTITIONER

## 2023-07-06 PROCEDURE — 93880 EXTRACRANIAL BILAT STUDY: CPT

## 2023-07-06 PROCEDURE — 84588 ASSAY OF VASOPRESSIN: CPT

## 2023-07-06 PROCEDURE — 99222 1ST HOSP IP/OBS MODERATE 55: CPT | Performed by: PSYCHIATRY & NEUROLOGY

## 2023-07-06 PROCEDURE — 85652 RBC SED RATE AUTOMATED: CPT

## 2023-07-06 PROCEDURE — 86140 C-REACTIVE PROTEIN: CPT

## 2023-07-06 PROCEDURE — 84443 ASSAY THYROID STIM HORMONE: CPT

## 2023-07-06 PROCEDURE — 2500000003 HC RX 250 WO HCPCS: Performed by: INTERNAL MEDICINE

## 2023-07-06 PROCEDURE — 83880 ASSAY OF NATRIURETIC PEPTIDE: CPT

## 2023-07-06 PROCEDURE — 82947 ASSAY GLUCOSE BLOOD QUANT: CPT

## 2023-07-06 PROCEDURE — 82533 TOTAL CORTISOL: CPT

## 2023-07-06 PROCEDURE — 96375 TX/PRO/DX INJ NEW DRUG ADDON: CPT

## 2023-07-06 RX ORDER — SODIUM CHLORIDE 0.9 % (FLUSH) 0.9 %
5-40 SYRINGE (ML) INJECTION PRN
Status: DISCONTINUED | OUTPATIENT
Start: 2023-07-06 | End: 2023-07-09 | Stop reason: HOSPADM

## 2023-07-06 RX ORDER — TOPIRAMATE 25 MG/1
50 TABLET ORAL 2 TIMES DAILY
Status: DISCONTINUED | OUTPATIENT
Start: 2023-07-06 | End: 2023-07-09 | Stop reason: HOSPADM

## 2023-07-06 RX ORDER — SODIUM CHLORIDE, SODIUM LACTATE, POTASSIUM CHLORIDE, AND CALCIUM CHLORIDE .6; .31; .03; .02 G/100ML; G/100ML; G/100ML; G/100ML
500 INJECTION, SOLUTION INTRAVENOUS ONCE
Status: COMPLETED | OUTPATIENT
Start: 2023-07-06 | End: 2023-07-06

## 2023-07-06 RX ORDER — MIDODRINE HYDROCHLORIDE 5 MG/1
5 TABLET ORAL 3 TIMES DAILY PRN
Status: DISCONTINUED | OUTPATIENT
Start: 2023-07-06 | End: 2023-07-09 | Stop reason: HOSPADM

## 2023-07-06 RX ORDER — THIAMINE HYDROCHLORIDE 100 MG/ML
100 INJECTION, SOLUTION INTRAMUSCULAR; INTRAVENOUS DAILY
Status: COMPLETED | OUTPATIENT
Start: 2023-07-06 | End: 2023-07-08

## 2023-07-06 RX ORDER — SODIUM CHLORIDE 9 MG/ML
INJECTION, SOLUTION INTRAVENOUS CONTINUOUS
Status: ACTIVE | OUTPATIENT
Start: 2023-07-06 | End: 2023-07-06

## 2023-07-06 RX ORDER — TOPIRAMATE 100 MG/1
100 TABLET, FILM COATED ORAL NIGHTLY PRN
Status: DISCONTINUED | OUTPATIENT
Start: 2023-07-06 | End: 2023-07-06

## 2023-07-06 RX ORDER — CYANOCOBALAMIN 1000 UG/ML
1000 INJECTION, SOLUTION INTRAMUSCULAR; SUBCUTANEOUS DAILY
Status: COMPLETED | OUTPATIENT
Start: 2023-07-06 | End: 2023-07-08

## 2023-07-06 RX ORDER — METOPROLOL TARTRATE 5 MG/5ML
5 INJECTION INTRAVENOUS EVERY 6 HOURS PRN
Status: DISCONTINUED | OUTPATIENT
Start: 2023-07-06 | End: 2023-07-09 | Stop reason: HOSPADM

## 2023-07-06 RX ORDER — SODIUM CHLORIDE 9 MG/ML
INJECTION, SOLUTION INTRAVENOUS PRN
Status: DISCONTINUED | OUTPATIENT
Start: 2023-07-06 | End: 2023-07-09 | Stop reason: HOSPADM

## 2023-07-06 RX ORDER — OXYCODONE HYDROCHLORIDE AND ACETAMINOPHEN 5; 325 MG/1; MG/1
1 TABLET ORAL EVERY 4 HOURS PRN
Status: DISCONTINUED | OUTPATIENT
Start: 2023-07-06 | End: 2023-07-09 | Stop reason: HOSPADM

## 2023-07-06 RX ORDER — SODIUM CHLORIDE 0.9 % (FLUSH) 0.9 %
5-40 SYRINGE (ML) INJECTION EVERY 12 HOURS SCHEDULED
Status: DISCONTINUED | OUTPATIENT
Start: 2023-07-06 | End: 2023-07-09 | Stop reason: HOSPADM

## 2023-07-06 RX ORDER — ENEMA 19; 7 G/133ML; G/133ML
1 ENEMA RECTAL DAILY PRN
Status: DISCONTINUED | OUTPATIENT
Start: 2023-07-06 | End: 2023-07-09 | Stop reason: HOSPADM

## 2023-07-06 RX ADMIN — INSULIN LISPRO 2 UNITS: 100 INJECTION, SOLUTION INTRAVENOUS; SUBCUTANEOUS at 17:32

## 2023-07-06 RX ADMIN — MYCOPHENOLATE MOFETIL 300 MG: 500 TABLET ORAL at 00:56

## 2023-07-06 RX ADMIN — THIAMINE HYDROCHLORIDE 100 MG: 100 INJECTION, SOLUTION INTRAMUSCULAR; INTRAVENOUS at 11:10

## 2023-07-06 RX ADMIN — MYCOPHENOLATE MOFETIL 300 MG: 500 TABLET ORAL at 08:33

## 2023-07-06 RX ADMIN — ALCOHOL 1 TABLET: 70.47 GEL TOPICAL at 08:33

## 2023-07-06 RX ADMIN — SODIUM CHLORIDE: 9 INJECTION, SOLUTION INTRAVENOUS at 04:17

## 2023-07-06 RX ADMIN — SODIUM CHLORIDE, PRESERVATIVE FREE 10 ML: 5 INJECTION INTRAVENOUS at 22:18

## 2023-07-06 RX ADMIN — MYCOPHENOLATE MOFETIL 300 MG: 500 TABLET ORAL at 22:18

## 2023-07-06 RX ADMIN — SODIUM CHLORIDE, POTASSIUM CHLORIDE, SODIUM LACTATE AND CALCIUM CHLORIDE 500 ML: 600; 310; 30; 20 INJECTION, SOLUTION INTRAVENOUS at 00:51

## 2023-07-06 RX ADMIN — PYRIDOXINE HYDROCHLORIDE 250 MG: 100 INJECTION, SOLUTION INTRAMUSCULAR; INTRAVENOUS at 11:10

## 2023-07-06 RX ADMIN — INSULIN LISPRO 2 UNITS: 100 INJECTION, SOLUTION INTRAVENOUS; SUBCUTANEOUS at 08:57

## 2023-07-06 RX ADMIN — CYANOCOBALAMIN 1000 MCG: 1000 INJECTION, SOLUTION INTRAMUSCULAR; SUBCUTANEOUS at 08:59

## 2023-07-06 RX ADMIN — LEVETIRACETAM 500 MG: 500 TABLET, FILM COATED ORAL at 22:18

## 2023-07-06 RX ADMIN — FOLIC ACID 2 MG: 5 INJECTION, SOLUTION INTRAMUSCULAR; INTRAVENOUS; SUBCUTANEOUS at 10:43

## 2023-07-06 RX ADMIN — OXYCODONE HYDROCHLORIDE AND ACETAMINOPHEN 1 TABLET: 5; 325 TABLET ORAL at 22:49

## 2023-07-06 RX ADMIN — FLUOXETINE HYDROCHLORIDE 40 MG: 20 CAPSULE ORAL at 08:33

## 2023-07-06 RX ADMIN — SODIUM CHLORIDE: 9 INJECTION, SOLUTION INTRAVENOUS at 10:23

## 2023-07-06 RX ADMIN — ENOXAPARIN SODIUM 30 MG: 30 INJECTION SUBCUTANEOUS at 08:58

## 2023-07-06 RX ADMIN — IRON SUCROSE 300 MG: 20 INJECTION, SOLUTION INTRAVENOUS at 11:19

## 2023-07-06 RX ADMIN — LEVETIRACETAM 500 MG: 500 TABLET, FILM COATED ORAL at 00:56

## 2023-07-06 RX ADMIN — LEVETIRACETAM 500 MG: 500 TABLET, FILM COATED ORAL at 08:33

## 2023-07-06 RX ADMIN — ONDANSETRON 4 MG: 2 INJECTION INTRAMUSCULAR; INTRAVENOUS at 00:54

## 2023-07-06 RX ADMIN — ENOXAPARIN SODIUM 30 MG: 30 INJECTION SUBCUTANEOUS at 22:18

## 2023-07-06 RX ADMIN — MYCOPHENOLATE MOFETIL 300 MG: 500 TABLET ORAL at 14:42

## 2023-07-06 RX ADMIN — OXYCODONE HYDROCHLORIDE AND ACETAMINOPHEN 1 TABLET: 5; 325 TABLET ORAL at 18:28

## 2023-07-06 RX ADMIN — OXYCODONE HYDROCHLORIDE AND ACETAMINOPHEN 1 TABLET: 5; 325 TABLET ORAL at 13:36

## 2023-07-06 RX ADMIN — OLANZAPINE 15 MG: 15 TABLET, FILM COATED ORAL at 22:18

## 2023-07-06 RX ADMIN — TOPIRAMATE 100 MG: 100 TABLET, FILM COATED ORAL at 05:47

## 2023-07-06 RX ADMIN — PRAVASTATIN SODIUM 40 MG: 20 TABLET ORAL at 05:47

## 2023-07-06 RX ADMIN — MIDODRINE HYDROCHLORIDE 5 MG: 5 TABLET ORAL at 09:06

## 2023-07-06 ASSESSMENT — PAIN SCALES - GENERAL
PAINLEVEL_OUTOF10: 10
PAINLEVEL_OUTOF10: 7
PAINLEVEL_OUTOF10: 8
PAINLEVEL_OUTOF10: 4
PAINLEVEL_OUTOF10: 8

## 2023-07-06 ASSESSMENT — ENCOUNTER SYMPTOMS
DIARRHEA: 0
NAUSEA: 0
CONSTIPATION: 0
SHORTNESS OF BREATH: 0
VOMITING: 0
CHEST TIGHTNESS: 0
BACK PAIN: 1
COUGH: 0
WHEEZING: 0
BLOOD IN STOOL: 0
ABDOMINAL PAIN: 0

## 2023-07-06 ASSESSMENT — PAIN DESCRIPTION - LOCATION
LOCATION: NECK;BACK
LOCATION: BACK
LOCATION: NECK;BACK

## 2023-07-06 NOTE — DISCHARGE SUMMARY
DISCHARGE SUMMARY:    PATIENT NAME:  Jose Alberto Blank  YOB: 1980  MEDICAL RECORD NO. [id number]  DATE: 07/06/23  PRIMARY CARE PHYSICIAN: Ayanna Britton  ADMIT DATE:  6/26/2023    DISCHARGE DATE:  6/29/2023  DISPOSITION:  home  ADMITTING DIAGNOSIS:   Etoh intoxication and fall while drunk- SAH    DIAGNOSIS:   Patient Active Problem List   Diagnosis    Pernicious anemia    History of ulcer disease    History of DVT of lower extremity    Primary hypercoagulable state ([address])    Amputation finger    GERD (gastroesophageal reflux disease)    Alcohol dependence in remission ([address])    Type 2 diabetes mellitus with diabetic polyneuropathy, with long-term current use of insulin (HCC)    Depression with suicidal ideation    Primary insomnia    Essential hypertension    Bipolar affective disorder in remission ([address])    History of pulmonary embolism    Antiphospholipid syndrome (HCC)    Alcohol intoxication ([address])    Hypertriglyceridemia    Chronic post-traumatic stress disorder (PTSD)    OCD (obsessive compulsive disorder)    Severe benzodiazepine use disorder (HCC)    Obesity, Class III, BMI 40-49.9 (morbid obesity) ([address])    History of MRSA infection    Pain of upper abdomen    Painting esophagus    NAFLD (nonalcoholic fatty liver disease)    Nondependent opioid abuse in remission ([address])    Osteopenia    History of Awa-en-Y gastric bypass    Acute cystitis with hematuria    Herpes genitalis    History of drug abuse ([address])    Malabsorption    History of pulmonary embolus (PE)    Vitamin B 12 deficiency    Vitamin D deficiency    History of surgery of liver    Blood alkaline phosphatase increased compared with prior measurement    Paroxysmal supraventricular tachycardia (HCC)    Anxiety    Hypomagnesemia    Chronic idiopathic constipation    Recurrent incisional hernia    History of small bowel obstruction    History of peptic ulcer    Fibromyalgia    Polyneuropathy    COVID-19 virus infection    GAIL (acute kidney

## 2023-07-06 NOTE — TELEPHONE ENCOUNTER
Attempted to contact patient to re-schedule appointment and make patient aware a CT is needed prior to appointment. Voicemail box was full.

## 2023-07-07 LAB
ANION GAP SERPL CALCULATED.3IONS-SCNC: 13 MMOL/L (ref 9–17)
ANION GAP SERPL CALCULATED.3IONS-SCNC: 13 MMOL/L (ref 9–17)
ANION GAP SERPL CALCULATED.3IONS-SCNC: 16 MMOL/L (ref 9–17)
ANION GAP SERPL CALCULATED.3IONS-SCNC: 7 MMOL/L (ref 9–17)
BUN SERPL-MCNC: 6 MG/DL (ref 6–20)
CALCIUM SERPL-MCNC: 8 MG/DL (ref 8.6–10.4)
CALCIUM SERPL-MCNC: 8.1 MG/DL (ref 8.6–10.4)
CALCIUM SERPL-MCNC: 8.3 MG/DL (ref 8.6–10.4)
CALCIUM SERPL-MCNC: 8.4 MG/DL (ref 8.6–10.4)
CHLORIDE SERPL-SCNC: 100 MMOL/L (ref 98–107)
CHLORIDE SERPL-SCNC: 101 MMOL/L (ref 98–107)
CHLORIDE SERPL-SCNC: 104 MMOL/L (ref 98–107)
CHLORIDE SERPL-SCNC: 104 MMOL/L (ref 98–107)
CO2 SERPL-SCNC: 19 MMOL/L (ref 20–31)
CO2 SERPL-SCNC: 20 MMOL/L (ref 20–31)
CO2 SERPL-SCNC: 22 MMOL/L (ref 20–31)
CO2 SERPL-SCNC: 26 MMOL/L (ref 20–31)
CREAT SERPL-MCNC: 0.69 MG/DL (ref 0.5–0.9)
CREAT SERPL-MCNC: 0.7 MG/DL (ref 0.5–0.9)
EKG ATRIAL RATE: 257 BPM
EKG ATRIAL RATE: 65 BPM
EKG P AXIS: 33 DEGREES
EKG P-R INTERVAL: 144 MS
EKG Q-T INTERVAL: 244 MS
EKG Q-T INTERVAL: 482 MS
EKG QRS DURATION: 84 MS
EKG QRS DURATION: 88 MS
EKG QTC CALCULATION (BAZETT): 442 MS
EKG QTC CALCULATION (BAZETT): 501 MS
EKG R AXIS: 43 DEGREES
EKG R AXIS: 56 DEGREES
EKG T AXIS: -21 DEGREES
EKG T AXIS: 47 DEGREES
EKG VENTRICULAR RATE: 197 BPM
EKG VENTRICULAR RATE: 65 BPM
GFR SERPL CREATININE-BSD FRML MDRD: >60 ML/MIN/1.73M2
GLUCOSE BLD-MCNC: 124 MG/DL (ref 65–105)
GLUCOSE BLD-MCNC: 127 MG/DL (ref 65–105)
GLUCOSE BLD-MCNC: 211 MG/DL (ref 65–105)
GLUCOSE BLD-MCNC: 217 MG/DL (ref 65–105)
GLUCOSE SERPL-MCNC: 140 MG/DL (ref 70–99)
GLUCOSE SERPL-MCNC: 150 MG/DL (ref 70–99)
GLUCOSE SERPL-MCNC: 196 MG/DL (ref 70–99)
GLUCOSE SERPL-MCNC: 199 MG/DL (ref 70–99)
MAGNESIUM SERPL-MCNC: 1.9 MG/DL (ref 1.6–2.6)
POTASSIUM SERPL-SCNC: 4 MMOL/L (ref 3.7–5.3)
POTASSIUM SERPL-SCNC: 4 MMOL/L (ref 3.7–5.3)
POTASSIUM SERPL-SCNC: 4.8 MMOL/L (ref 3.7–5.3)
POTASSIUM SERPL-SCNC: 4.8 MMOL/L (ref 3.7–5.3)
REASON FOR REJECTION: NORMAL
SODIUM SERPL-SCNC: 133 MMOL/L (ref 135–144)
SODIUM SERPL-SCNC: 136 MMOL/L (ref 135–144)
SODIUM SERPL-SCNC: 137 MMOL/L (ref 135–144)
SODIUM SERPL-SCNC: 139 MMOL/L (ref 135–144)
SPECIMEN SOURCE: NORMAL
TROPONIN I SERPL HS-MCNC: 12 NG/L (ref 0–14)
ZZ NTE CLEAN UP: ORDERED TEST: NORMAL

## 2023-07-07 PROCEDURE — 99233 SBSQ HOSP IP/OBS HIGH 50: CPT | Performed by: STUDENT IN AN ORGANIZED HEALTH CARE EDUCATION/TRAINING PROGRAM

## 2023-07-07 PROCEDURE — 84484 ASSAY OF TROPONIN QUANT: CPT

## 2023-07-07 PROCEDURE — 99223 1ST HOSP IP/OBS HIGH 75: CPT | Performed by: INTERNAL MEDICINE

## 2023-07-07 PROCEDURE — 99232 SBSQ HOSP IP/OBS MODERATE 35: CPT | Performed by: PSYCHIATRY & NEUROLOGY

## 2023-07-07 PROCEDURE — 2580000003 HC RX 258: Performed by: INTERNAL MEDICINE

## 2023-07-07 PROCEDURE — 6370000000 HC RX 637 (ALT 250 FOR IP): Performed by: PSYCHIATRY & NEUROLOGY

## 2023-07-07 PROCEDURE — 6370000000 HC RX 637 (ALT 250 FOR IP): Performed by: NURSE PRACTITIONER

## 2023-07-07 PROCEDURE — 6370000000 HC RX 637 (ALT 250 FOR IP): Performed by: INTERNAL MEDICINE

## 2023-07-07 PROCEDURE — 6360000002 HC RX W HCPCS: Performed by: STUDENT IN AN ORGANIZED HEALTH CARE EDUCATION/TRAINING PROGRAM

## 2023-07-07 PROCEDURE — 6360000002 HC RX W HCPCS: Performed by: INTERNAL MEDICINE

## 2023-07-07 PROCEDURE — 6360000002 HC RX W HCPCS: Performed by: NURSE PRACTITIONER

## 2023-07-07 PROCEDURE — 2060000000 HC ICU INTERMEDIATE R&B

## 2023-07-07 PROCEDURE — 93010 ELECTROCARDIOGRAM REPORT: CPT | Performed by: INTERNAL MEDICINE

## 2023-07-07 PROCEDURE — 82947 ASSAY GLUCOSE BLOOD QUANT: CPT

## 2023-07-07 PROCEDURE — 83735 ASSAY OF MAGNESIUM: CPT

## 2023-07-07 PROCEDURE — 80048 BASIC METABOLIC PNL TOTAL CA: CPT

## 2023-07-07 PROCEDURE — 93005 ELECTROCARDIOGRAM TRACING: CPT | Performed by: STUDENT IN AN ORGANIZED HEALTH CARE EDUCATION/TRAINING PROGRAM

## 2023-07-07 PROCEDURE — 6370000000 HC RX 637 (ALT 250 FOR IP): Performed by: STUDENT IN AN ORGANIZED HEALTH CARE EDUCATION/TRAINING PROGRAM

## 2023-07-07 PROCEDURE — 93005 ELECTROCARDIOGRAM TRACING: CPT | Performed by: EMERGENCY MEDICINE

## 2023-07-07 PROCEDURE — 36415 COLL VENOUS BLD VENIPUNCTURE: CPT

## 2023-07-07 RX ORDER — LORAZEPAM 2 MG/ML
0.5 INJECTION INTRAMUSCULAR ONCE
Status: COMPLETED | OUTPATIENT
Start: 2023-07-07 | End: 2023-07-07

## 2023-07-07 RX ORDER — FENTANYL CITRATE 50 UG/ML
25 INJECTION, SOLUTION INTRAMUSCULAR; INTRAVENOUS ONCE
Status: COMPLETED | OUTPATIENT
Start: 2023-07-07 | End: 2023-07-07

## 2023-07-07 RX ORDER — METOPROLOL TARTRATE 5 MG/5ML
2.5 INJECTION INTRAVENOUS ONCE
Status: CANCELLED | OUTPATIENT
Start: 2023-07-07 | End: 2023-07-07

## 2023-07-07 RX ORDER — DILTIAZEM HYDROCHLORIDE 5 MG/ML
10 INJECTION INTRAVENOUS ONCE
Status: DISCONTINUED | OUTPATIENT
Start: 2023-07-07 | End: 2023-07-07

## 2023-07-07 RX ADMIN — OXYCODONE HYDROCHLORIDE AND ACETAMINOPHEN 1 TABLET: 5; 325 TABLET ORAL at 20:04

## 2023-07-07 RX ADMIN — ALCOHOL 1 TABLET: 70.47 GEL TOPICAL at 07:51

## 2023-07-07 RX ADMIN — FLUOXETINE HYDROCHLORIDE 40 MG: 20 CAPSULE ORAL at 07:51

## 2023-07-07 RX ADMIN — MYCOPHENOLATE MOFETIL 300 MG: 500 TABLET ORAL at 13:17

## 2023-07-07 RX ADMIN — ENOXAPARIN SODIUM 30 MG: 30 INJECTION SUBCUTANEOUS at 07:56

## 2023-07-07 RX ADMIN — LEVETIRACETAM 500 MG: 500 TABLET, FILM COATED ORAL at 20:16

## 2023-07-07 RX ADMIN — MYCOPHENOLATE MOFETIL 300 MG: 500 TABLET ORAL at 07:52

## 2023-07-07 RX ADMIN — MYCOPHENOLATE MOFETIL 300 MG: 500 TABLET ORAL at 21:42

## 2023-07-07 RX ADMIN — LORAZEPAM 0.5 MG: 2 INJECTION INTRAMUSCULAR; INTRAVENOUS at 10:02

## 2023-07-07 RX ADMIN — OXYCODONE HYDROCHLORIDE AND ACETAMINOPHEN 1 TABLET: 5; 325 TABLET ORAL at 03:36

## 2023-07-07 RX ADMIN — OXYCODONE HYDROCHLORIDE AND ACETAMINOPHEN 1 TABLET: 5; 325 TABLET ORAL at 11:28

## 2023-07-07 RX ADMIN — PYRIDOXINE HYDROCHLORIDE 250 MG: 100 INJECTION, SOLUTION INTRAMUSCULAR; INTRAVENOUS at 07:56

## 2023-07-07 RX ADMIN — CYANOCOBALAMIN 1000 MCG: 1000 INJECTION, SOLUTION INTRAMUSCULAR; SUBCUTANEOUS at 07:54

## 2023-07-07 RX ADMIN — OLANZAPINE 15 MG: 15 TABLET, FILM COATED ORAL at 21:42

## 2023-07-07 RX ADMIN — TOPIRAMATE 50 MG: 25 TABLET, FILM COATED ORAL at 08:03

## 2023-07-07 RX ADMIN — METOPROLOL TARTRATE 25 MG: 25 TABLET ORAL at 11:58

## 2023-07-07 RX ADMIN — LEVETIRACETAM 500 MG: 500 TABLET, FILM COATED ORAL at 07:52

## 2023-07-07 RX ADMIN — SODIUM CHLORIDE, PRESERVATIVE FREE 10 ML: 5 INJECTION INTRAVENOUS at 20:15

## 2023-07-07 RX ADMIN — SODIUM CHLORIDE, PRESERVATIVE FREE 10 ML: 5 INJECTION INTRAVENOUS at 07:52

## 2023-07-07 RX ADMIN — FENTANYL CITRATE 25 MCG: 50 INJECTION, SOLUTION INTRAMUSCULAR; INTRAVENOUS at 10:01

## 2023-07-07 RX ADMIN — OXYCODONE HYDROCHLORIDE AND ACETAMINOPHEN 1 TABLET: 5; 325 TABLET ORAL at 07:48

## 2023-07-07 RX ADMIN — ENOXAPARIN SODIUM 30 MG: 30 INJECTION SUBCUTANEOUS at 20:13

## 2023-07-07 RX ADMIN — OXYCODONE HYDROCHLORIDE AND ACETAMINOPHEN 1 TABLET: 5; 325 TABLET ORAL at 15:51

## 2023-07-07 RX ADMIN — TOPIRAMATE 50 MG: 25 TABLET, FILM COATED ORAL at 20:16

## 2023-07-07 RX ADMIN — METOPROLOL TARTRATE 25 MG: 25 TABLET ORAL at 20:12

## 2023-07-07 RX ADMIN — INSULIN LISPRO 2 UNITS: 100 INJECTION, SOLUTION INTRAVENOUS; SUBCUTANEOUS at 16:16

## 2023-07-07 RX ADMIN — THIAMINE HYDROCHLORIDE 100 MG: 100 INJECTION, SOLUTION INTRAMUSCULAR; INTRAVENOUS at 07:59

## 2023-07-07 ASSESSMENT — PAIN DESCRIPTION - DESCRIPTORS: DESCRIPTORS: ACHING

## 2023-07-07 ASSESSMENT — PAIN SCALES - GENERAL
PAINLEVEL_OUTOF10: 7
PAINLEVEL_OUTOF10: 8
PAINLEVEL_OUTOF10: 9
PAINLEVEL_OUTOF10: 8

## 2023-07-07 ASSESSMENT — ENCOUNTER SYMPTOMS
BLOOD IN STOOL: 0
CHEST TIGHTNESS: 0
VOMITING: 0
PHOTOPHOBIA: 0
SHORTNESS OF BREATH: 0
EYE PAIN: 0
DIARRHEA: 0
WHEEZING: 0
NAUSEA: 0
COUGH: 0
ABDOMINAL PAIN: 0
CONSTIPATION: 0
EYE REDNESS: 0

## 2023-07-07 ASSESSMENT — PAIN DESCRIPTION - LOCATION
LOCATION: NECK;BACK
LOCATION: BACK;SHOULDER
LOCATION: BACK

## 2023-07-07 ASSESSMENT — PAIN DESCRIPTION - ORIENTATION
ORIENTATION: RIGHT;LEFT
ORIENTATION: LOWER

## 2023-07-08 LAB
ANION GAP SERPL CALCULATED.3IONS-SCNC: 10 MMOL/L (ref 9–17)
ANION GAP SERPL CALCULATED.3IONS-SCNC: 11 MMOL/L (ref 9–17)
ANION GAP SERPL CALCULATED.3IONS-SCNC: 9 MMOL/L (ref 9–17)
BUN SERPL-MCNC: 6 MG/DL (ref 6–20)
BUN SERPL-MCNC: 6 MG/DL (ref 6–20)
BUN SERPL-MCNC: 7 MG/DL (ref 6–20)
CALCIUM SERPL-MCNC: 8.1 MG/DL (ref 8.6–10.4)
CALCIUM SERPL-MCNC: 8.2 MG/DL (ref 8.6–10.4)
CALCIUM SERPL-MCNC: 8.4 MG/DL (ref 8.6–10.4)
CHLORIDE SERPL-SCNC: 102 MMOL/L (ref 98–107)
CHLORIDE SERPL-SCNC: 103 MMOL/L (ref 98–107)
CHLORIDE SERPL-SCNC: 104 MMOL/L (ref 98–107)
CO2 SERPL-SCNC: 22 MMOL/L (ref 20–31)
CO2 SERPL-SCNC: 23 MMOL/L (ref 20–31)
CO2 SERPL-SCNC: 23 MMOL/L (ref 20–31)
CREAT SERPL-MCNC: 0.61 MG/DL (ref 0.5–0.9)
CREAT SERPL-MCNC: 0.74 MG/DL (ref 0.5–0.9)
CREAT SERPL-MCNC: 0.86 MG/DL (ref 0.5–0.9)
GFR SERPL CREATININE-BSD FRML MDRD: >60 ML/MIN/1.73M2
GLUCOSE BLD-MCNC: 105 MG/DL (ref 65–105)
GLUCOSE BLD-MCNC: 112 MG/DL (ref 65–105)
GLUCOSE BLD-MCNC: 211 MG/DL (ref 65–105)
GLUCOSE BLD-MCNC: 233 MG/DL (ref 65–105)
GLUCOSE SERPL-MCNC: 114 MG/DL (ref 70–99)
GLUCOSE SERPL-MCNC: 216 MG/DL (ref 70–99)
GLUCOSE SERPL-MCNC: 221 MG/DL (ref 70–99)
POTASSIUM SERPL-SCNC: 4.2 MMOL/L (ref 3.7–5.3)
POTASSIUM SERPL-SCNC: 4.5 MMOL/L (ref 3.7–5.3)
POTASSIUM SERPL-SCNC: 5.4 MMOL/L (ref 3.7–5.3)
SODIUM SERPL-SCNC: 135 MMOL/L (ref 135–144)
SODIUM SERPL-SCNC: 136 MMOL/L (ref 135–144)
SODIUM SERPL-SCNC: 136 MMOL/L (ref 135–144)

## 2023-07-08 PROCEDURE — 6370000000 HC RX 637 (ALT 250 FOR IP): Performed by: STUDENT IN AN ORGANIZED HEALTH CARE EDUCATION/TRAINING PROGRAM

## 2023-07-08 PROCEDURE — 97162 PT EVAL MOD COMPLEX 30 MIN: CPT

## 2023-07-08 PROCEDURE — 6360000002 HC RX W HCPCS: Performed by: NURSE PRACTITIONER

## 2023-07-08 PROCEDURE — 93005 ELECTROCARDIOGRAM TRACING: CPT | Performed by: EMERGENCY MEDICINE

## 2023-07-08 PROCEDURE — 6370000000 HC RX 637 (ALT 250 FOR IP): Performed by: NURSE PRACTITIONER

## 2023-07-08 PROCEDURE — 97116 GAIT TRAINING THERAPY: CPT

## 2023-07-08 PROCEDURE — 36415 COLL VENOUS BLD VENIPUNCTURE: CPT

## 2023-07-08 PROCEDURE — 2580000003 HC RX 258: Performed by: INTERNAL MEDICINE

## 2023-07-08 PROCEDURE — 6360000002 HC RX W HCPCS: Performed by: INTERNAL MEDICINE

## 2023-07-08 PROCEDURE — 99233 SBSQ HOSP IP/OBS HIGH 50: CPT | Performed by: NURSE PRACTITIONER

## 2023-07-08 PROCEDURE — 99232 SBSQ HOSP IP/OBS MODERATE 35: CPT | Performed by: STUDENT IN AN ORGANIZED HEALTH CARE EDUCATION/TRAINING PROGRAM

## 2023-07-08 PROCEDURE — 80048 BASIC METABOLIC PNL TOTAL CA: CPT

## 2023-07-08 PROCEDURE — 82947 ASSAY GLUCOSE BLOOD QUANT: CPT

## 2023-07-08 PROCEDURE — 6370000000 HC RX 637 (ALT 250 FOR IP): Performed by: PSYCHIATRY & NEUROLOGY

## 2023-07-08 PROCEDURE — 2580000003 HC RX 258: Performed by: NURSE PRACTITIONER

## 2023-07-08 PROCEDURE — 6370000000 HC RX 637 (ALT 250 FOR IP): Performed by: INTERNAL MEDICINE

## 2023-07-08 PROCEDURE — 97110 THERAPEUTIC EXERCISES: CPT

## 2023-07-08 PROCEDURE — 2060000000 HC ICU INTERMEDIATE R&B

## 2023-07-08 RX ORDER — MIDODRINE HYDROCHLORIDE 2.5 MG/1
2.5 TABLET ORAL
Status: DISCONTINUED | OUTPATIENT
Start: 2023-07-08 | End: 2023-07-09 | Stop reason: HOSPADM

## 2023-07-08 RX ADMIN — LEVETIRACETAM 500 MG: 500 TABLET, FILM COATED ORAL at 07:40

## 2023-07-08 RX ADMIN — METOPROLOL TARTRATE 25 MG: 25 TABLET ORAL at 07:42

## 2023-07-08 RX ADMIN — ENOXAPARIN SODIUM 30 MG: 30 INJECTION SUBCUTANEOUS at 07:40

## 2023-07-08 RX ADMIN — MYCOPHENOLATE MOFETIL 300 MG: 500 TABLET ORAL at 07:40

## 2023-07-08 RX ADMIN — TOPIRAMATE 50 MG: 25 TABLET, FILM COATED ORAL at 07:41

## 2023-07-08 RX ADMIN — SODIUM CHLORIDE, PRESERVATIVE FREE 10 ML: 5 INJECTION INTRAVENOUS at 07:45

## 2023-07-08 RX ADMIN — OXYCODONE HYDROCHLORIDE AND ACETAMINOPHEN 1 TABLET: 5; 325 TABLET ORAL at 16:47

## 2023-07-08 RX ADMIN — OXYCODONE HYDROCHLORIDE AND ACETAMINOPHEN 1 TABLET: 5; 325 TABLET ORAL at 00:04

## 2023-07-08 RX ADMIN — PYRIDOXINE HYDROCHLORIDE 250 MG: 100 INJECTION, SOLUTION INTRAMUSCULAR; INTRAVENOUS at 07:44

## 2023-07-08 RX ADMIN — METOPROLOL TARTRATE 25 MG: 25 TABLET ORAL at 19:28

## 2023-07-08 RX ADMIN — MYCOPHENOLATE MOFETIL 300 MG: 500 TABLET ORAL at 19:28

## 2023-07-08 RX ADMIN — LEVETIRACETAM 500 MG: 500 TABLET, FILM COATED ORAL at 19:28

## 2023-07-08 RX ADMIN — THIAMINE HYDROCHLORIDE 100 MG: 100 INJECTION, SOLUTION INTRAMUSCULAR; INTRAVENOUS at 07:45

## 2023-07-08 RX ADMIN — SODIUM CHLORIDE, PRESERVATIVE FREE 10 ML: 5 INJECTION INTRAVENOUS at 07:46

## 2023-07-08 RX ADMIN — MYCOPHENOLATE MOFETIL 300 MG: 500 TABLET ORAL at 12:26

## 2023-07-08 RX ADMIN — OXYCODONE HYDROCHLORIDE AND ACETAMINOPHEN 1 TABLET: 5; 325 TABLET ORAL at 20:59

## 2023-07-08 RX ADMIN — MIDODRINE HYDROCHLORIDE 2.5 MG: 5 TABLET ORAL at 12:18

## 2023-07-08 RX ADMIN — ENOXAPARIN SODIUM 30 MG: 30 INJECTION SUBCUTANEOUS at 19:27

## 2023-07-08 RX ADMIN — TOPIRAMATE 50 MG: 25 TABLET, FILM COATED ORAL at 19:28

## 2023-07-08 RX ADMIN — MIDODRINE HYDROCHLORIDE 2.5 MG: 5 TABLET ORAL at 16:44

## 2023-07-08 RX ADMIN — OXYCODONE HYDROCHLORIDE AND ACETAMINOPHEN 1 TABLET: 5; 325 TABLET ORAL at 12:22

## 2023-07-08 RX ADMIN — FLUOXETINE HYDROCHLORIDE 40 MG: 20 CAPSULE ORAL at 07:42

## 2023-07-08 RX ADMIN — ALCOHOL 1 TABLET: 70.47 GEL TOPICAL at 07:40

## 2023-07-08 RX ADMIN — SODIUM CHLORIDE, PRESERVATIVE FREE 10 ML: 5 INJECTION INTRAVENOUS at 19:27

## 2023-07-08 RX ADMIN — OXYCODONE HYDROCHLORIDE AND ACETAMINOPHEN 1 TABLET: 5; 325 TABLET ORAL at 08:11

## 2023-07-08 RX ADMIN — OXYCODONE HYDROCHLORIDE AND ACETAMINOPHEN 1 TABLET: 5; 325 TABLET ORAL at 04:34

## 2023-07-08 RX ADMIN — INSULIN LISPRO 2 UNITS: 100 INJECTION, SOLUTION INTRAVENOUS; SUBCUTANEOUS at 16:45

## 2023-07-08 RX ADMIN — CYANOCOBALAMIN 1000 MCG: 1000 INJECTION, SOLUTION INTRAMUSCULAR; SUBCUTANEOUS at 08:05

## 2023-07-08 RX ADMIN — OLANZAPINE 15 MG: 15 TABLET, FILM COATED ORAL at 19:28

## 2023-07-08 ASSESSMENT — ENCOUNTER SYMPTOMS
SHORTNESS OF BREATH: 0
VOMITING: 0
COUGH: 0
ABDOMINAL PAIN: 0
DIARRHEA: 0
NAUSEA: 0
CHEST TIGHTNESS: 0
WHEEZING: 0
BLOOD IN STOOL: 0
CONSTIPATION: 0

## 2023-07-08 ASSESSMENT — PAIN DESCRIPTION - LOCATION
LOCATION: HEAD
LOCATION: BACK;SHOULDER

## 2023-07-08 ASSESSMENT — PAIN SCALES - GENERAL
PAINLEVEL_OUTOF10: 7
PAINLEVEL_OUTOF10: 3
PAINLEVEL_OUTOF10: 7
PAINLEVEL_OUTOF10: 7
PAINLEVEL_OUTOF10: 8
PAINLEVEL_OUTOF10: 7
PAINLEVEL_OUTOF10: 0
PAINLEVEL_OUTOF10: 3

## 2023-07-08 ASSESSMENT — PAIN DESCRIPTION - DESCRIPTORS: DESCRIPTORS: ACHING;DISCOMFORT;SHARP

## 2023-07-08 ASSESSMENT — PAIN DESCRIPTION - ORIENTATION: ORIENTATION: RIGHT

## 2023-07-09 VITALS
TEMPERATURE: 98.3 F | SYSTOLIC BLOOD PRESSURE: 91 MMHG | RESPIRATION RATE: 16 BRPM | OXYGEN SATURATION: 99 % | HEART RATE: 60 BPM | DIASTOLIC BLOOD PRESSURE: 68 MMHG | WEIGHT: 260.58 LBS | BODY MASS INDEX: 39.49 KG/M2 | HEIGHT: 68 IN

## 2023-07-09 PROBLEM — I26.99 ACUTE PULMONARY EMBOLISM (HCC): Status: RESOLVED | Noted: 2022-03-17 | Resolved: 2023-07-09

## 2023-07-09 PROBLEM — E83.42 HYPOMAGNESEMIA: Status: RESOLVED | Noted: 2018-02-14 | Resolved: 2023-07-09

## 2023-07-09 PROBLEM — N30.01 ACUTE CYSTITIS WITH HEMATURIA: Status: RESOLVED | Noted: 2019-02-24 | Resolved: 2023-07-09

## 2023-07-09 PROBLEM — R77.8 TROPONIN LEVEL ELEVATED: Status: RESOLVED | Noted: 2023-04-14 | Resolved: 2023-07-09

## 2023-07-09 PROBLEM — N17.9 AKI (ACUTE KIDNEY INJURY) (HCC): Status: RESOLVED | Noted: 2022-03-13 | Resolved: 2023-07-09

## 2023-07-09 PROBLEM — E83.51 HYPOCALCEMIA: Status: RESOLVED | Noted: 2023-04-14 | Resolved: 2023-07-09

## 2023-07-09 PROBLEM — E87.6 HYPOKALEMIA: Status: RESOLVED | Noted: 2023-04-14 | Resolved: 2023-07-09

## 2023-07-09 PROBLEM — R10.10 PAIN OF UPPER ABDOMEN: Status: RESOLVED | Noted: 2019-02-23 | Resolved: 2023-07-09

## 2023-07-09 PROBLEM — G93.40 ENCEPHALOPATHY: Status: RESOLVED | Noted: 2023-05-06 | Resolved: 2023-07-09

## 2023-07-09 PROBLEM — R79.89 TROPONIN LEVEL ELEVATED: Status: RESOLVED | Noted: 2023-04-14 | Resolved: 2023-07-09

## 2023-07-09 PROBLEM — R07.9 CHEST PAIN: Status: RESOLVED | Noted: 2023-04-29 | Resolved: 2023-07-09

## 2023-07-09 PROBLEM — M85.80 OSTEOPENIA: Status: RESOLVED | Noted: 2017-10-12 | Resolved: 2023-07-09

## 2023-07-09 PROBLEM — R74.01 TRANSAMINASEMIA: Status: RESOLVED | Noted: 2022-06-10 | Resolved: 2023-07-09

## 2023-07-09 LAB
BASOPHILS # BLD: 0.04 K/UL (ref 0–0.2)
BASOPHILS NFR BLD: 1 % (ref 0–2)
EKG ATRIAL RATE: 65 BPM
EKG ATRIAL RATE: 96 BPM
EKG P AXIS: 63 DEGREES
EKG P-R INTERVAL: 150 MS
EKG Q-T INTERVAL: 298 MS
EKG Q-T INTERVAL: 376 MS
EKG QRS DURATION: 84 MS
EKG QRS DURATION: 86 MS
EKG QTC CALCULATION (BAZETT): 475 MS
EKG QTC CALCULATION (BAZETT): 498 MS
EKG R AXIS: 43 DEGREES
EKG R AXIS: 64 DEGREES
EKG T AXIS: -5 DEGREES
EKG T AXIS: 46 DEGREES
EKG VENTRICULAR RATE: 168 BPM
EKG VENTRICULAR RATE: 96 BPM
EOSINOPHIL # BLD: 0.04 K/UL (ref 0–0.44)
EOSINOPHILS RELATIVE PERCENT: 1 % (ref 1–4)
ERYTHROCYTE [DISTWIDTH] IN BLOOD BY AUTOMATED COUNT: 13.6 % (ref 11.8–14.4)
GLUCOSE BLD-MCNC: 153 MG/DL (ref 65–105)
GLUCOSE BLD-MCNC: 198 MG/DL (ref 65–105)
HCT VFR BLD AUTO: 43.3 % (ref 36.3–47.1)
HGB BLD-MCNC: 13.6 G/DL (ref 11.9–15.1)
IMM GRANULOCYTES # BLD AUTO: <0.03 K/UL (ref 0–0.3)
IMM GRANULOCYTES NFR BLD: 1 %
LYMPHOCYTES # BLD: 38 % (ref 24–43)
LYMPHOCYTES NFR BLD: 1.62 K/UL (ref 1.1–3.7)
MCH RBC QN AUTO: 31.6 PG (ref 25.2–33.5)
MCHC RBC AUTO-ENTMCNC: 31.4 G/DL (ref 28.4–34.8)
MCV RBC AUTO: 100.5 FL (ref 82.6–102.9)
MONOCYTES NFR BLD: 0.53 K/UL (ref 0.1–1.2)
MONOCYTES NFR BLD: 12 % (ref 3–12)
NEUTROPHILS NFR BLD: 47 % (ref 36–65)
NEUTS SEG NFR BLD: 2.01 K/UL (ref 1.5–8.1)
NRBC BLD-RTO: 0 PER 100 WBC
PLATELET # BLD AUTO: 198 K/UL (ref 138–453)
PMV BLD AUTO: 11.5 FL (ref 8.1–13.5)
RBC # BLD AUTO: 4.31 M/UL (ref 3.95–5.11)
VASOPRESSIN SERPL-MCNC: <0.5 PG/ML (ref 0–6.9)
WBC OTHER # BLD: 4.3 K/UL (ref 3.5–11.3)

## 2023-07-09 PROCEDURE — 36415 COLL VENOUS BLD VENIPUNCTURE: CPT

## 2023-07-09 PROCEDURE — 99239 HOSP IP/OBS DSCHRG MGMT >30: CPT | Performed by: STUDENT IN AN ORGANIZED HEALTH CARE EDUCATION/TRAINING PROGRAM

## 2023-07-09 PROCEDURE — 6360000002 HC RX W HCPCS: Performed by: NURSE PRACTITIONER

## 2023-07-09 PROCEDURE — 82947 ASSAY GLUCOSE BLOOD QUANT: CPT

## 2023-07-09 PROCEDURE — 93010 ELECTROCARDIOGRAM REPORT: CPT | Performed by: INTERNAL MEDICINE

## 2023-07-09 PROCEDURE — 2580000003 HC RX 258: Performed by: INTERNAL MEDICINE

## 2023-07-09 PROCEDURE — 6370000000 HC RX 637 (ALT 250 FOR IP): Performed by: INTERNAL MEDICINE

## 2023-07-09 PROCEDURE — 6370000000 HC RX 637 (ALT 250 FOR IP): Performed by: PSYCHIATRY & NEUROLOGY

## 2023-07-09 PROCEDURE — 6370000000 HC RX 637 (ALT 250 FOR IP): Performed by: NURSE PRACTITIONER

## 2023-07-09 PROCEDURE — 85027 COMPLETE CBC AUTOMATED: CPT

## 2023-07-09 PROCEDURE — 6370000000 HC RX 637 (ALT 250 FOR IP): Performed by: STUDENT IN AN ORGANIZED HEALTH CARE EDUCATION/TRAINING PROGRAM

## 2023-07-09 RX ORDER — BACITRACIN ZINC AND POLYMYXIN B SULFATE 500; 1000 [USP'U]/G; [USP'U]/G
OINTMENT TOPICAL
Qty: 20 EACH | Refills: 0 | Status: SHIPPED | OUTPATIENT
Start: 2023-07-09 | End: 2023-07-16

## 2023-07-09 RX ORDER — MIDODRINE HYDROCHLORIDE 2.5 MG/1
2.5 TABLET ORAL 2 TIMES DAILY PRN
Qty: 15 TABLET | Refills: 0 | Status: SHIPPED | OUTPATIENT
Start: 2023-07-09

## 2023-07-09 RX ORDER — OXYCODONE HYDROCHLORIDE AND ACETAMINOPHEN 5; 325 MG/1; MG/1
1 TABLET ORAL EVERY 8 HOURS PRN
Qty: 7 TABLET | Refills: 0 | Status: SHIPPED | OUTPATIENT
Start: 2023-07-09 | End: 2023-07-12

## 2023-07-09 RX ORDER — ENOXAPARIN SODIUM 100 MG/ML
30 INJECTION SUBCUTANEOUS 2 TIMES DAILY
Qty: 4.2 ML | Refills: 0 | Status: ON HOLD | OUTPATIENT
Start: 2023-07-09 | End: 2023-07-15 | Stop reason: HOSPADM

## 2023-07-09 RX ADMIN — METOPROLOL TARTRATE 25 MG: 25 TABLET ORAL at 07:54

## 2023-07-09 RX ADMIN — MIDODRINE HYDROCHLORIDE 2.5 MG: 5 TABLET ORAL at 07:53

## 2023-07-09 RX ADMIN — OXYCODONE HYDROCHLORIDE AND ACETAMINOPHEN 1 TABLET: 5; 325 TABLET ORAL at 01:07

## 2023-07-09 RX ADMIN — LEVETIRACETAM 500 MG: 500 TABLET, FILM COATED ORAL at 07:54

## 2023-07-09 RX ADMIN — OXYCODONE HYDROCHLORIDE AND ACETAMINOPHEN 1 TABLET: 5; 325 TABLET ORAL at 09:41

## 2023-07-09 RX ADMIN — SODIUM CHLORIDE, PRESERVATIVE FREE 20 ML: 5 INJECTION INTRAVENOUS at 07:54

## 2023-07-09 RX ADMIN — MIDODRINE HYDROCHLORIDE 2.5 MG: 5 TABLET ORAL at 12:14

## 2023-07-09 RX ADMIN — OXYCODONE HYDROCHLORIDE AND ACETAMINOPHEN 1 TABLET: 5; 325 TABLET ORAL at 13:58

## 2023-07-09 RX ADMIN — TOPIRAMATE 50 MG: 25 TABLET, FILM COATED ORAL at 07:53

## 2023-07-09 RX ADMIN — MYCOPHENOLATE MOFETIL 300 MG: 500 TABLET ORAL at 13:59

## 2023-07-09 RX ADMIN — OXYCODONE HYDROCHLORIDE AND ACETAMINOPHEN 1 TABLET: 5; 325 TABLET ORAL at 05:35

## 2023-07-09 RX ADMIN — MYCOPHENOLATE MOFETIL 300 MG: 500 TABLET ORAL at 07:54

## 2023-07-09 RX ADMIN — FLUOXETINE HYDROCHLORIDE 40 MG: 20 CAPSULE ORAL at 07:54

## 2023-07-09 RX ADMIN — ENOXAPARIN SODIUM 30 MG: 30 INJECTION SUBCUTANEOUS at 07:53

## 2023-07-09 RX ADMIN — ALCOHOL 1 TABLET: 70.47 GEL TOPICAL at 07:54

## 2023-07-09 ASSESSMENT — PAIN SCALES - GENERAL
PAINLEVEL_OUTOF10: 3
PAINLEVEL_OUTOF10: 3
PAINLEVEL_OUTOF10: 0
PAINLEVEL_OUTOF10: 8
PAINLEVEL_OUTOF10: 3
PAINLEVEL_OUTOF10: 0
PAINLEVEL_OUTOF10: 7
PAINLEVEL_OUTOF10: 0
PAINLEVEL_OUTOF10: 0
PAINLEVEL_OUTOF10: 7
PAINLEVEL_OUTOF10: 0
PAINLEVEL_OUTOF10: 8
PAINLEVEL_OUTOF10: 0
PAINLEVEL_OUTOF10: 3
PAINLEVEL_OUTOF10: 0
PAINLEVEL_OUTOF10: 7
PAINLEVEL_OUTOF10: 3

## 2023-07-09 ASSESSMENT — PAIN DESCRIPTION - ORIENTATION
ORIENTATION: RIGHT
ORIENTATION: RIGHT
ORIENTATION: RIGHT;LEFT
ORIENTATION: LEFT
ORIENTATION: LEFT

## 2023-07-09 ASSESSMENT — PAIN DESCRIPTION - FREQUENCY: FREQUENCY: INTERMITTENT

## 2023-07-09 ASSESSMENT — PAIN - FUNCTIONAL ASSESSMENT
PAIN_FUNCTIONAL_ASSESSMENT: PREVENTS OR INTERFERES WITH MANY ACTIVE NOT PASSIVE ACTIVITIES
PAIN_FUNCTIONAL_ASSESSMENT: PREVENTS OR INTERFERES SOME ACTIVE ACTIVITIES AND ADLS
PAIN_FUNCTIONAL_ASSESSMENT: PREVENTS OR INTERFERES WITH MANY ACTIVE NOT PASSIVE ACTIVITIES

## 2023-07-09 ASSESSMENT — PAIN DESCRIPTION - LOCATION
LOCATION: BACK;SHOULDER
LOCATION: BACK
LOCATION: BACK;SHOULDER
LOCATION: BACK;SHOULDER
LOCATION: BACK

## 2023-07-09 ASSESSMENT — PAIN DESCRIPTION - DESCRIPTORS
DESCRIPTORS: ACHING;DISCOMFORT
DESCRIPTORS: ACHING;DISCOMFORT
DESCRIPTORS: ACHING;DISCOMFORT;SHARP
DESCRIPTORS: ACHING;DISCOMFORT;SHARP
DESCRIPTORS: ACHING;DISCOMFORT

## 2023-07-09 ASSESSMENT — PAIN DESCRIPTION - PAIN TYPE: TYPE: ACUTE PAIN

## 2023-07-09 ASSESSMENT — PAIN SCALES - WONG BAKER: WONGBAKER_NUMERICALRESPONSE: 0

## 2023-07-09 ASSESSMENT — PAIN DESCRIPTION - ONSET: ONSET: ON-GOING

## 2023-07-10 LAB
EKG ATRIAL RATE: 107 BPM
EKG P AXIS: 44 DEGREES
EKG P-R INTERVAL: 144 MS
EKG Q-T INTERVAL: 348 MS
EKG QRS DURATION: 80 MS
EKG QTC CALCULATION (BAZETT): 464 MS
EKG R AXIS: 49 DEGREES
EKG T AXIS: 33 DEGREES
EKG VENTRICULAR RATE: 107 BPM

## 2023-07-10 NOTE — PROGRESS NOTES
CLINICAL PHARMACY NOTE: MEDS TO BEDS    Total # of Prescriptions Filled: 4   The following medications were delivered to the patient:  Double antibiotic oint  Enoxaparin sod 30mg/0.3ml  Midodrine 2.5mg tabs  Oxycodone/apap 5-325mg tabs    Additional Documentation:  Delivered to pt in room 416 on 7/9 at 3.  Copay was $4.05 paid with [employer]

## 2023-07-11 LAB
EKG ATRIAL RATE: 74 BPM
EKG P AXIS: 61 DEGREES
EKG P-R INTERVAL: 140 MS
EKG Q-T INTERVAL: 422 MS
EKG QRS DURATION: 88 MS
EKG QTC CALCULATION (BAZETT): 468 MS
EKG R AXIS: 57 DEGREES
EKG T AXIS: 52 DEGREES
EKG VENTRICULAR RATE: 74 BPM

## 2023-07-13 ENCOUNTER — HOSPITAL ENCOUNTER (INPATIENT)
Age: 43
LOS: 3 days | Discharge: HOME | DRG: 897 | End: 2023-07-16
Attending: EMERGENCY MEDICINE | Admitting: INTERNAL MEDICINE
Payer: MEDICARE

## 2023-07-13 DIAGNOSIS — E87.20 ACIDOSIS: ICD-10-CM

## 2023-07-13 DIAGNOSIS — R73.9 HYPERGLYCEMIA: ICD-10-CM

## 2023-07-13 DIAGNOSIS — F10.920 ACUTE ALCOHOLIC INTOXICATION WITHOUT COMPLICATION (HCC): Primary | ICD-10-CM

## 2023-07-13 DIAGNOSIS — R29.6 FREQUENT FALLS: ICD-10-CM

## 2023-07-13 DIAGNOSIS — M54.6 MIDLINE THORACIC BACK PAIN, UNSPECIFIED CHRONICITY: ICD-10-CM

## 2023-07-13 PROBLEM — E87.29 METABOLIC ACIDOSIS, INCREASED ANION GAP: Status: ACTIVE | Noted: 2023-07-13

## 2023-07-13 LAB
ALBUMIN SERPL-MCNC: 2.5 G/DL (ref 3.5–5.2)
ALP SERPL-CCNC: 230 U/L (ref 35–104)
ALT SERPL-CCNC: 61 U/L (ref 5–33)
ANION GAP SERPL CALCULATED.3IONS-SCNC: 22 MMOL/L (ref 9–17)
AST SERPL-CCNC: 70 U/L
B-OH-BUTYR SERPL-MCNC: 0.17 MMOL/L (ref 0.02–0.27)
BASOPHILS # BLD: 0.1 K/UL (ref 0–0.2)
BASOPHILS NFR BLD: 1 % (ref 0–2)
BILIRUB SERPL-MCNC: 0.3 MG/DL (ref 0.3–1.2)
BILIRUB UR QL STRIP: NEGATIVE
BODY TEMPERATURE: 37
BUN SERPL-MCNC: <2 MG/DL (ref 6–20)
CALCIUM SERPL-MCNC: 7.6 MG/DL (ref 8.6–10.4)
CHLORIDE SERPL-SCNC: 102 MMOL/L (ref 98–107)
CLARITY UR: CLEAR
CO2 SERPL-SCNC: 13 MMOL/L (ref 20–31)
COHGB MFR BLD: 0.1 % (ref 0–5)
COLOR UR: YELLOW
COMMENT UA: ABNORMAL
CREAT SERPL-MCNC: 0.6 MG/DL (ref 0.5–0.9)
EKG ATRIAL RATE: 134 BPM
EKG P-R INTERVAL: 96 MS
EKG Q-T INTERVAL: 390 MS
EKG QRS DURATION: 76 MS
EKG QTC CALCULATION (BAZETT): 582 MS
EKG R AXIS: 50 DEGREES
EKG T AXIS: 70 DEGREES
EKG VENTRICULAR RATE: 134 BPM
EOSINOPHIL # BLD: 0 K/UL (ref 0–0.4)
EOSINOPHILS RELATIVE PERCENT: 1 % (ref 0–4)
ERYTHROCYTE [DISTWIDTH] IN BLOOD BY AUTOMATED COUNT: 15.3 % (ref 11.5–14.9)
ETHANOL PERCENT: 0.28 %
ETHANOLAMINE SERPL-MCNC: 285 MG/DL
GAS FLOW.O2 O2 DELIVERY SYS: ABNORMAL L/MIN
GFR SERPL CREATININE-BSD FRML MDRD: >60 ML/MIN/1.73M2
GLUCOSE BLD-MCNC: 272 MG/DL (ref 65–105)
GLUCOSE BLD-MCNC: 397 MG/DL (ref 65–105)
GLUCOSE BLD-MCNC: 555 MG/DL (ref 65–105)
GLUCOSE BLD-MCNC: 600 MG/DL
GLUCOSE SERPL-MCNC: 665 MG/DL (ref 70–99)
GLUCOSE UR STRIP-MCNC: ABNORMAL MG/DL
HCO3 VENOUS: 13.2 MMOL/L (ref 24–30)
HCT VFR BLD AUTO: 41.5 % (ref 36–46)
HGB BLD-MCNC: 13.2 G/DL (ref 12–16)
HGB UR QL STRIP.AUTO: NEGATIVE
KETONES UR STRIP-MCNC: NEGATIVE MG/DL
LEUKOCYTE ESTERASE UR QL STRIP: NEGATIVE
LIPASE SERPL-CCNC: 8 U/L (ref 13–60)
LYMPHOCYTES # BLD: 23 % (ref 24–44)
LYMPHOCYTES NFR BLD: 1.4 K/UL (ref 1–4.8)
MAGNESIUM SERPL-MCNC: 2.5 MG/DL (ref 1.6–2.6)
MCH RBC QN AUTO: 30.9 PG (ref 26–34)
MCHC RBC AUTO-ENTMCNC: 31.8 G/DL (ref 31–37)
MCV RBC AUTO: 97.1 FL (ref 80–100)
METHEMOGLOBIN: 0.4 % (ref 0–1.9)
MONOCYTES NFR BLD: 0.6 K/UL (ref 0.1–1.3)
MONOCYTES NFR BLD: 11 % (ref 1–7)
NEGATIVE BASE EXCESS, VEN: 12.9 MMOL/L (ref 0–2)
NEUTROPHILS NFR BLD: 64 % (ref 36–66)
NEUTS SEG NFR BLD: 3.8 K/UL (ref 1.3–9.1)
NITRITE UR QL STRIP: NEGATIVE
O2 SAT, VEN: 95.5 % (ref 60–85)
PCO2, VEN: 25.6 MM HG (ref 39–55)
PH UR STRIP: 5.5 [PH] (ref 5–8)
PH VENOUS: 7.32 (ref 7.32–7.42)
PLATELET # BLD AUTO: 304 K/UL (ref 150–450)
PMV BLD AUTO: 9.5 FL (ref 6–12)
PO2, VEN: 216 MM HG (ref 30–50)
POTASSIUM SERPL-SCNC: 4.2 MMOL/L (ref 3.7–5.3)
PROT SERPL-MCNC: 6 G/DL (ref 6.4–8.3)
PROT UR STRIP-MCNC: NEGATIVE MG/DL
RBC # BLD AUTO: 4.27 M/UL (ref 4–5.2)
SODIUM SERPL-SCNC: 137 MMOL/L (ref 135–144)
SP GR UR STRIP: 1.03 (ref 1–1.03)
TEXT FOR RESPIRATORY: ABNORMAL
UROBILINOGEN UR STRIP-ACNC: NORMAL EU/DL (ref 0–1)
WBC OTHER # BLD: 6 K/UL (ref 3.5–11)

## 2023-07-13 PROCEDURE — 6360000002 HC RX W HCPCS: Performed by: NURSE PRACTITIONER

## 2023-07-13 PROCEDURE — 6370000000 HC RX 637 (ALT 250 FOR IP): Performed by: NURSE PRACTITIONER

## 2023-07-13 PROCEDURE — 96375 TX/PRO/DX INJ NEW DRUG ADDON: CPT

## 2023-07-13 PROCEDURE — 2060000000 HC ICU INTERMEDIATE R&B

## 2023-07-13 PROCEDURE — 83735 ASSAY OF MAGNESIUM: CPT

## 2023-07-13 PROCEDURE — 93005 ELECTROCARDIOGRAM TRACING: CPT | Performed by: EMERGENCY MEDICINE

## 2023-07-13 PROCEDURE — 93010 ELECTROCARDIOGRAM REPORT: CPT | Performed by: INTERNAL MEDICINE

## 2023-07-13 PROCEDURE — 96372 THER/PROPH/DIAG INJ SC/IM: CPT

## 2023-07-13 PROCEDURE — 36415 COLL VENOUS BLD VENIPUNCTURE: CPT

## 2023-07-13 PROCEDURE — 83930 ASSAY OF BLOOD OSMOLALITY: CPT

## 2023-07-13 PROCEDURE — 85027 COMPLETE CBC AUTOMATED: CPT

## 2023-07-13 PROCEDURE — 96376 TX/PRO/DX INJ SAME DRUG ADON: CPT

## 2023-07-13 PROCEDURE — 2580000003 HC RX 258: Performed by: NURSE PRACTITIONER

## 2023-07-13 PROCEDURE — 2500000003 HC RX 250 WO HCPCS: Performed by: EMERGENCY MEDICINE

## 2023-07-13 PROCEDURE — 81003 URINALYSIS AUTO W/O SCOPE: CPT

## 2023-07-13 PROCEDURE — 82010 KETONE BODYS QUAN: CPT

## 2023-07-13 PROCEDURE — 6360000002 HC RX W HCPCS: Performed by: EMERGENCY MEDICINE

## 2023-07-13 PROCEDURE — 6370000000 HC RX 637 (ALT 250 FOR IP): Performed by: EMERGENCY MEDICINE

## 2023-07-13 PROCEDURE — 96365 THER/PROPH/DIAG IV INF INIT: CPT

## 2023-07-13 PROCEDURE — G0480 DRUG TEST DEF 1-7 CLASSES: HCPCS

## 2023-07-13 PROCEDURE — 2580000003 HC RX 258: Performed by: EMERGENCY MEDICINE

## 2023-07-13 PROCEDURE — 82947 ASSAY GLUCOSE BLOOD QUANT: CPT

## 2023-07-13 PROCEDURE — 80053 COMPREHEN METABOLIC PANEL: CPT

## 2023-07-13 PROCEDURE — HZ2ZZZZ DETOXIFICATION SERVICES FOR SUBSTANCE ABUSE TREATMENT: ICD-10-PCS

## 2023-07-13 PROCEDURE — 82805 BLOOD GASES W/O2 SATURATION: CPT

## 2023-07-13 PROCEDURE — 83690 ASSAY OF LIPASE: CPT

## 2023-07-13 PROCEDURE — 99285 EMERGENCY DEPT VISIT HI MDM: CPT

## 2023-07-13 RX ORDER — LORAZEPAM 2 MG/ML
4 INJECTION INTRAMUSCULAR
Status: DISCONTINUED | OUTPATIENT
Start: 2023-07-13 | End: 2023-07-13 | Stop reason: SDUPTHER

## 2023-07-13 RX ORDER — ENOXAPARIN SODIUM 100 MG/ML
30 INJECTION SUBCUTANEOUS 2 TIMES DAILY
Status: DISCONTINUED | OUTPATIENT
Start: 2023-07-13 | End: 2023-07-13 | Stop reason: SDUPTHER

## 2023-07-13 RX ORDER — BENZTROPINE MESYLATE 1 MG/1
1 TABLET ORAL 2 TIMES DAILY
Status: DISCONTINUED | OUTPATIENT
Start: 2023-07-13 | End: 2023-07-16 | Stop reason: HOSPADM

## 2023-07-13 RX ORDER — SODIUM CHLORIDE 0.9 % (FLUSH) 0.9 %
5-40 SYRINGE (ML) INJECTION PRN
Status: DISCONTINUED | OUTPATIENT
Start: 2023-07-13 | End: 2023-07-16 | Stop reason: HOSPADM

## 2023-07-13 RX ORDER — GAUZE BANDAGE 2" X 2"
100 BANDAGE TOPICAL DAILY
Status: DISCONTINUED | OUTPATIENT
Start: 2023-07-13 | End: 2023-07-13 | Stop reason: SDUPTHER

## 2023-07-13 RX ORDER — INSULIN LISPRO 100 [IU]/ML
0-4 INJECTION, SOLUTION INTRAVENOUS; SUBCUTANEOUS NIGHTLY
Status: DISCONTINUED | OUTPATIENT
Start: 2023-07-13 | End: 2023-07-16 | Stop reason: HOSPADM

## 2023-07-13 RX ORDER — ACYCLOVIR 200 MG/1
400 CAPSULE ORAL 3 TIMES DAILY
Status: DISCONTINUED | OUTPATIENT
Start: 2023-07-13 | End: 2023-07-16 | Stop reason: HOSPADM

## 2023-07-13 RX ORDER — LORAZEPAM 2 MG/ML
3 INJECTION INTRAMUSCULAR
Status: DISCONTINUED | OUTPATIENT
Start: 2023-07-13 | End: 2023-07-13 | Stop reason: SDUPTHER

## 2023-07-13 RX ORDER — FLUOXETINE HYDROCHLORIDE 20 MG/1
40 CAPSULE ORAL DAILY
Status: DISCONTINUED | OUTPATIENT
Start: 2023-07-14 | End: 2023-07-16 | Stop reason: HOSPADM

## 2023-07-13 RX ORDER — LORAZEPAM 1 MG/1
1 TABLET ORAL
Status: DISCONTINUED | OUTPATIENT
Start: 2023-07-13 | End: 2023-07-13 | Stop reason: SDUPTHER

## 2023-07-13 RX ORDER — SODIUM CHLORIDE 9 MG/ML
INJECTION, SOLUTION INTRAVENOUS PRN
Status: DISCONTINUED | OUTPATIENT
Start: 2023-07-13 | End: 2023-07-16 | Stop reason: HOSPADM

## 2023-07-13 RX ORDER — POLYETHYLENE GLYCOL 3350 17 G/17G
17 POWDER, FOR SOLUTION ORAL DAILY PRN
Status: DISCONTINUED | OUTPATIENT
Start: 2023-07-13 | End: 2023-07-16 | Stop reason: HOSPADM

## 2023-07-13 RX ORDER — ONDANSETRON 2 MG/ML
4 INJECTION INTRAMUSCULAR; INTRAVENOUS EVERY 6 HOURS PRN
Status: DISCONTINUED | OUTPATIENT
Start: 2023-07-13 | End: 2023-07-13

## 2023-07-13 RX ORDER — ALPRAZOLAM 1 MG/1
2 TABLET ORAL NIGHTLY PRN
COMMUNITY
End: 2023-07-13 | Stop reason: DRUGHIGH

## 2023-07-13 RX ORDER — FOLIC ACID 1 MG/1
1 TABLET ORAL DAILY
Status: DISCONTINUED | OUTPATIENT
Start: 2023-07-14 | End: 2023-07-16 | Stop reason: HOSPADM

## 2023-07-13 RX ORDER — LORAZEPAM 1 MG/1
3 TABLET ORAL
Status: DISCONTINUED | OUTPATIENT
Start: 2023-07-13 | End: 2023-07-15

## 2023-07-13 RX ORDER — THIAMINE HYDROCHLORIDE 100 MG/ML
100 INJECTION, SOLUTION INTRAMUSCULAR; INTRAVENOUS ONCE
Status: COMPLETED | OUTPATIENT
Start: 2023-07-13 | End: 2023-07-13

## 2023-07-13 RX ORDER — IBUPROFEN 200 MG
400 TABLET ORAL EVERY 8 HOURS PRN
Status: ON HOLD | COMMUNITY

## 2023-07-13 RX ORDER — SODIUM CHLORIDE 0.9 % (FLUSH) 0.9 %
5-40 SYRINGE (ML) INJECTION EVERY 12 HOURS SCHEDULED
Status: DISCONTINUED | OUTPATIENT
Start: 2023-07-13 | End: 2023-07-16 | Stop reason: HOSPADM

## 2023-07-13 RX ORDER — LORAZEPAM 2 MG/ML
4 INJECTION INTRAMUSCULAR
Status: DISCONTINUED | OUTPATIENT
Start: 2023-07-13 | End: 2023-07-15

## 2023-07-13 RX ORDER — GAUZE BANDAGE 2" X 2"
100 BANDAGE TOPICAL DAILY
Status: DISCONTINUED | OUTPATIENT
Start: 2023-07-14 | End: 2023-07-16 | Stop reason: HOSPADM

## 2023-07-13 RX ORDER — ACETAMINOPHEN 500 MG
500 TABLET ORAL EVERY 6 HOURS PRN
Status: DISCONTINUED | OUTPATIENT
Start: 2023-07-13 | End: 2023-07-16 | Stop reason: HOSPADM

## 2023-07-13 RX ORDER — LORAZEPAM 1 MG/1
3 TABLET ORAL
Status: DISCONTINUED | OUTPATIENT
Start: 2023-07-13 | End: 2023-07-13 | Stop reason: SDUPTHER

## 2023-07-13 RX ORDER — LORAZEPAM 1 MG/1
2 TABLET ORAL NIGHTLY
Status: ON HOLD | COMMUNITY

## 2023-07-13 RX ORDER — ONDANSETRON 4 MG/1
4 TABLET, ORALLY DISINTEGRATING ORAL EVERY 8 HOURS PRN
Status: DISCONTINUED | OUTPATIENT
Start: 2023-07-13 | End: 2023-07-13

## 2023-07-13 RX ORDER — SODIUM CHLORIDE 9 MG/ML
INJECTION, SOLUTION INTRAVENOUS CONTINUOUS
Status: DISCONTINUED | OUTPATIENT
Start: 2023-07-13 | End: 2023-07-14

## 2023-07-13 RX ORDER — HYDROCODONE BITARTRATE AND ACETAMINOPHEN 5; 325 MG/1; MG/1
1 TABLET ORAL EVERY 6 HOURS PRN
Status: DISCONTINUED | OUTPATIENT
Start: 2023-07-13 | End: 2023-07-16 | Stop reason: HOSPADM

## 2023-07-13 RX ORDER — TOPIRAMATE 100 MG/1
100 TABLET, FILM COATED ORAL NIGHTLY
Status: DISCONTINUED | OUTPATIENT
Start: 2023-07-13 | End: 2023-07-16 | Stop reason: HOSPADM

## 2023-07-13 RX ORDER — LORAZEPAM 2 MG/ML
2 INJECTION INTRAMUSCULAR
Status: DISCONTINUED | OUTPATIENT
Start: 2023-07-13 | End: 2023-07-13 | Stop reason: SDUPTHER

## 2023-07-13 RX ORDER — TRAZODONE HYDROCHLORIDE 50 MG/1
50 TABLET ORAL NIGHTLY PRN
Status: DISCONTINUED | OUTPATIENT
Start: 2023-07-13 | End: 2023-07-16 | Stop reason: HOSPADM

## 2023-07-13 RX ORDER — LORAZEPAM 1 MG/1
2 TABLET ORAL
Status: DISCONTINUED | OUTPATIENT
Start: 2023-07-13 | End: 2023-07-13 | Stop reason: SDUPTHER

## 2023-07-13 RX ORDER — LORAZEPAM 2 MG/ML
2 INJECTION INTRAMUSCULAR
Status: DISCONTINUED | OUTPATIENT
Start: 2023-07-13 | End: 2023-07-15

## 2023-07-13 RX ORDER — LORAZEPAM 2 MG/ML
1 INJECTION INTRAMUSCULAR
Status: DISCONTINUED | OUTPATIENT
Start: 2023-07-13 | End: 2023-07-15

## 2023-07-13 RX ORDER — INSULIN LISPRO 100 [IU]/ML
0-8 INJECTION, SOLUTION INTRAVENOUS; SUBCUTANEOUS
Status: DISCONTINUED | OUTPATIENT
Start: 2023-07-14 | End: 2023-07-16 | Stop reason: HOSPADM

## 2023-07-13 RX ORDER — MIDODRINE HYDROCHLORIDE 2.5 MG/1
2.5 TABLET ORAL 2 TIMES DAILY PRN
Status: DISCONTINUED | OUTPATIENT
Start: 2023-07-13 | End: 2023-07-16 | Stop reason: HOSPADM

## 2023-07-13 RX ORDER — GABAPENTIN 300 MG/1
300 CAPSULE ORAL 3 TIMES DAILY
Status: DISCONTINUED | OUTPATIENT
Start: 2023-07-13 | End: 2023-07-16 | Stop reason: HOSPADM

## 2023-07-13 RX ORDER — BACITRACIN ZINC AND POLYMYXIN B SULFATE 500; 1000 [USP'U]/G; [USP'U]/G
OINTMENT TOPICAL 2 TIMES DAILY
Status: DISCONTINUED | OUTPATIENT
Start: 2023-07-13 | End: 2023-07-16 | Stop reason: HOSPADM

## 2023-07-13 RX ORDER — INSULIN LISPRO 100 [IU]/ML
10 INJECTION, SOLUTION INTRAVENOUS; SUBCUTANEOUS ONCE
Status: COMPLETED | OUTPATIENT
Start: 2023-07-13 | End: 2023-07-13

## 2023-07-13 RX ORDER — 0.9 % SODIUM CHLORIDE 0.9 %
1000 INTRAVENOUS SOLUTION INTRAVENOUS ONCE
Status: COMPLETED | OUTPATIENT
Start: 2023-07-13 | End: 2023-07-13

## 2023-07-13 RX ORDER — OLANZAPINE 10 MG/1
15 TABLET ORAL NIGHTLY
Status: DISCONTINUED | OUTPATIENT
Start: 2023-07-13 | End: 2023-07-16 | Stop reason: HOSPADM

## 2023-07-13 RX ORDER — LORAZEPAM 1 MG/1
4 TABLET ORAL
Status: DISCONTINUED | OUTPATIENT
Start: 2023-07-13 | End: 2023-07-13 | Stop reason: SDUPTHER

## 2023-07-13 RX ORDER — PANTOPRAZOLE SODIUM 40 MG/1
40 TABLET, DELAYED RELEASE ORAL DAILY PRN
Status: DISCONTINUED | OUTPATIENT
Start: 2023-07-13 | End: 2023-07-16 | Stop reason: HOSPADM

## 2023-07-13 RX ORDER — DEXTROSE MONOHYDRATE 100 MG/ML
INJECTION, SOLUTION INTRAVENOUS CONTINUOUS PRN
Status: DISCONTINUED | OUTPATIENT
Start: 2023-07-13 | End: 2023-07-16 | Stop reason: HOSPADM

## 2023-07-13 RX ORDER — LORAZEPAM 1 MG/1
2 TABLET ORAL
Status: DISCONTINUED | OUTPATIENT
Start: 2023-07-13 | End: 2023-07-15

## 2023-07-13 RX ORDER — ACETAMINOPHEN 500 MG
500 TABLET ORAL EVERY 6 HOURS PRN
Status: ON HOLD | COMMUNITY

## 2023-07-13 RX ORDER — ENOXAPARIN SODIUM 100 MG/ML
30 INJECTION SUBCUTANEOUS 2 TIMES DAILY
Status: DISCONTINUED | OUTPATIENT
Start: 2023-07-13 | End: 2023-07-14

## 2023-07-13 RX ORDER — MAGNESIUM SULFATE HEPTAHYDRATE 40 MG/ML
2000 INJECTION, SOLUTION INTRAVENOUS ONCE
Status: COMPLETED | OUTPATIENT
Start: 2023-07-13 | End: 2023-07-13

## 2023-07-13 RX ORDER — LORAZEPAM 1 MG/1
1 TABLET ORAL
Status: DISCONTINUED | OUTPATIENT
Start: 2023-07-13 | End: 2023-07-15

## 2023-07-13 RX ORDER — LORAZEPAM 1 MG/1
4 TABLET ORAL
Status: DISCONTINUED | OUTPATIENT
Start: 2023-07-13 | End: 2023-07-15

## 2023-07-13 RX ORDER — PROCHLORPERAZINE EDISYLATE 5 MG/ML
10 INJECTION INTRAMUSCULAR; INTRAVENOUS EVERY 6 HOURS PRN
Status: DISCONTINUED | OUTPATIENT
Start: 2023-07-13 | End: 2023-07-16 | Stop reason: HOSPADM

## 2023-07-13 RX ORDER — LORAZEPAM 2 MG/ML
3 INJECTION INTRAMUSCULAR
Status: DISCONTINUED | OUTPATIENT
Start: 2023-07-13 | End: 2023-07-15

## 2023-07-13 RX ORDER — LORAZEPAM 2 MG/ML
1 INJECTION INTRAMUSCULAR
Status: DISCONTINUED | OUTPATIENT
Start: 2023-07-13 | End: 2023-07-13 | Stop reason: SDUPTHER

## 2023-07-13 RX ORDER — DIPHENHYDRAMINE HYDROCHLORIDE 50 MG/ML
25 INJECTION INTRAMUSCULAR; INTRAVENOUS EVERY 6 HOURS PRN
Status: DISCONTINUED | OUTPATIENT
Start: 2023-07-13 | End: 2023-07-16 | Stop reason: HOSPADM

## 2023-07-13 RX ADMIN — ACYCLOVIR 400 MG: 200 CAPSULE ORAL at 23:22

## 2023-07-13 RX ADMIN — THIAMINE HYDROCHLORIDE 100 MG: 100 INJECTION, SOLUTION INTRAMUSCULAR; INTRAVENOUS at 16:47

## 2023-07-13 RX ADMIN — LORAZEPAM 3 MG: 1 TABLET ORAL at 23:00

## 2023-07-13 RX ADMIN — BACITRACIN ZINC AND POLYMYXIN B SULFATE: at 23:29

## 2023-07-13 RX ADMIN — HYDROCODONE BITARTRATE AND ACETAMINOPHEN 1 TABLET: 5; 325 TABLET ORAL at 23:00

## 2023-07-13 RX ADMIN — MAGNESIUM SULFATE HEPTAHYDRATE 2000 MG: 40 INJECTION, SOLUTION INTRAVENOUS at 16:57

## 2023-07-13 RX ADMIN — SODIUM CHLORIDE 1000 ML: 9 INJECTION, SOLUTION INTRAVENOUS at 16:31

## 2023-07-13 RX ADMIN — METOPROLOL TARTRATE 25 MG: 25 TABLET, FILM COATED ORAL at 23:30

## 2023-07-13 RX ADMIN — SODIUM CHLORIDE 1000 ML: 9 INJECTION, SOLUTION INTRAVENOUS at 19:04

## 2023-07-13 RX ADMIN — ENOXAPARIN SODIUM 30 MG: 100 INJECTION SUBCUTANEOUS at 23:27

## 2023-07-13 RX ADMIN — LORAZEPAM 1 MG: 2 INJECTION INTRAMUSCULAR; INTRAVENOUS at 19:03

## 2023-07-13 RX ADMIN — INSULIN LISPRO 10 UNITS: 100 INJECTION, SOLUTION INTRAVENOUS; SUBCUTANEOUS at 19:47

## 2023-07-13 RX ADMIN — LORAZEPAM 1 MG: 2 INJECTION INTRAMUSCULAR; INTRAVENOUS at 20:29

## 2023-07-13 RX ADMIN — OLANZAPINE 15 MG: 15 TABLET, FILM COATED ORAL at 23:20

## 2023-07-13 RX ADMIN — BENZTROPINE MESYLATE 1 MG: 1 TABLET ORAL at 23:22

## 2023-07-13 RX ADMIN — SODIUM CHLORIDE: 9 INJECTION, SOLUTION INTRAVENOUS at 22:22

## 2023-07-13 RX ADMIN — TOPIRAMATE 100 MG: 100 TABLET, FILM COATED ORAL at 23:21

## 2023-07-13 RX ADMIN — GABAPENTIN 300 MG: 300 CAPSULE ORAL at 23:26

## 2023-07-13 ASSESSMENT — PAIN DESCRIPTION - LOCATION
LOCATION: BACK
LOCATION: BACK

## 2023-07-13 ASSESSMENT — LIFESTYLE VARIABLES
HOW OFTEN DO YOU HAVE A DRINK CONTAINING ALCOHOL: 2-4 TIMES A MONTH
HOW MANY STANDARD DRINKS CONTAINING ALCOHOL DO YOU HAVE ON A TYPICAL DAY: 7 TO 9

## 2023-07-13 ASSESSMENT — PAIN DESCRIPTION - DESCRIPTORS: DESCRIPTORS: ACHING;DISCOMFORT;THROBBING

## 2023-07-13 ASSESSMENT — PAIN DESCRIPTION - ORIENTATION: ORIENTATION: LOWER

## 2023-07-13 ASSESSMENT — PAIN SCALES - GENERAL
PAINLEVEL_OUTOF10: 5
PAINLEVEL_OUTOF10: 0
PAINLEVEL_OUTOF10: 6

## 2023-07-14 PROBLEM — F31.9 BIPOLAR DEPRESSION (HCC): Status: ACTIVE | Noted: 2023-07-14

## 2023-07-14 LAB
ALBUMIN SERPL-MCNC: 2 G/DL (ref 3.5–5.2)
ALP SERPL-CCNC: 169 U/L (ref 35–104)
ALT SERPL-CCNC: 44 U/L (ref 5–33)
ANION GAP SERPL CALCULATED.3IONS-SCNC: 11 MMOL/L (ref 9–17)
AST SERPL-CCNC: 54 U/L
BILIRUB SERPL-MCNC: 0.4 MG/DL (ref 0.3–1.2)
BNP SERPL-MCNC: 349 PG/ML
BODY TEMPERATURE: 37
BODY TEMPERATURE: 37
BUN SERPL-MCNC: <2 MG/DL (ref 6–20)
CALCIUM SERPL-MCNC: 7.2 MG/DL (ref 8.6–10.4)
CHLORIDE SERPL-SCNC: 108 MMOL/L (ref 98–107)
CO2 SERPL-SCNC: 20 MMOL/L (ref 20–31)
COHGB MFR BLD: 1.8 % (ref 0–5)
COHGB MFR BLD: 1.8 % (ref 0–5)
CREAT SERPL-MCNC: 0.5 MG/DL (ref 0.5–0.9)
GFR SERPL CREATININE-BSD FRML MDRD: >60 ML/MIN/1.73M2
GLUCOSE BLD-MCNC: 131 MG/DL (ref 65–105)
GLUCOSE BLD-MCNC: 138 MG/DL (ref 65–105)
GLUCOSE BLD-MCNC: 59 MG/DL (ref 65–105)
GLUCOSE BLD-MCNC: 80 MG/DL (ref 65–105)
GLUCOSE SERPL-MCNC: 284 MG/DL (ref 70–99)
HCO3 VENOUS: 22.2 MMOL/L (ref 24–30)
HCO3 VENOUS: 22.2 MMOL/L (ref 24–30)
INR PPP: 1.4
METHEMOGLOBIN: 1.1 % (ref 0–1.9)
METHEMOGLOBIN: 1.1 % (ref 0–1.9)
NEGATIVE BASE EXCESS, VEN: 0.9 MMOL/L (ref 0–2)
NEGATIVE BASE EXCESS, VEN: 0.9 MMOL/L (ref 0–2)
O2 SAT, VEN: 93 % (ref 60–85)
O2 SAT, VEN: 93 % (ref 60–85)
OSMOLALITY SERPL: 306 MOSM/KG (ref 275–295)
OSMOLALITY SERPL: 392 MOSM/KG (ref 275–295)
PARTIAL THROMBOPLASTIN TIME: 34.1 SEC (ref 24–36)
PCO2, VEN, TEMP ADJ: ABNORMAL MMHG (ref 39–55)
PCO2, VEN: 28.4 MM HG (ref 39–55)
PCO2, VEN: 28.4 MM HG (ref 39–55)
PH VENOUS: 7.5 (ref 7.32–7.42)
PH VENOUS: 7.5 (ref 7.32–7.42)
PH, VEN, TEMP ADJ: ABNORMAL (ref 7.32–7.42)
PO2, VEN, TEMP ADJ: ABNORMAL MMHG (ref 30–50)
PO2, VEN: 131 MM HG (ref 30–50)
PO2, VEN: 131 MM HG (ref 30–50)
POTASSIUM SERPL-SCNC: 4 MMOL/L (ref 3.7–5.3)
PROT SERPL-MCNC: 4.7 G/DL (ref 6.4–8.3)
PROTHROMBIN TIME: 17.5 SEC (ref 11.8–14.6)
SODIUM SERPL-SCNC: 139 MMOL/L (ref 135–144)

## 2023-07-14 PROCEDURE — 2580000003 HC RX 258: Performed by: NURSE PRACTITIONER

## 2023-07-14 PROCEDURE — 82805 BLOOD GASES W/O2 SATURATION: CPT

## 2023-07-14 PROCEDURE — 6360000002 HC RX W HCPCS: Performed by: NURSE PRACTITIONER

## 2023-07-14 PROCEDURE — 99232 SBSQ HOSP IP/OBS MODERATE 35: CPT | Performed by: PSYCHIATRY & NEUROLOGY

## 2023-07-14 PROCEDURE — 6370000000 HC RX 637 (ALT 250 FOR IP): Performed by: NURSE PRACTITIONER

## 2023-07-14 PROCEDURE — 6370000000 HC RX 637 (ALT 250 FOR IP): Performed by: EMERGENCY MEDICINE

## 2023-07-14 PROCEDURE — 85730 THROMBOPLASTIN TIME PARTIAL: CPT

## 2023-07-14 PROCEDURE — 83930 ASSAY OF BLOOD OSMOLALITY: CPT

## 2023-07-14 PROCEDURE — 83880 ASSAY OF NATRIURETIC PEPTIDE: CPT

## 2023-07-14 PROCEDURE — 6370000000 HC RX 637 (ALT 250 FOR IP)

## 2023-07-14 PROCEDURE — 2060000000 HC ICU INTERMEDIATE R&B

## 2023-07-14 PROCEDURE — 36415 COLL VENOUS BLD VENIPUNCTURE: CPT

## 2023-07-14 PROCEDURE — 6370000000 HC RX 637 (ALT 250 FOR IP): Performed by: PSYCHIATRY & NEUROLOGY

## 2023-07-14 PROCEDURE — 82800 BLOOD PH: CPT

## 2023-07-14 PROCEDURE — 80053 COMPREHEN METABOLIC PANEL: CPT

## 2023-07-14 PROCEDURE — 85610 PROTHROMBIN TIME: CPT

## 2023-07-14 PROCEDURE — 82947 ASSAY GLUCOSE BLOOD QUANT: CPT

## 2023-07-14 PROCEDURE — 99223 1ST HOSP IP/OBS HIGH 75: CPT | Performed by: INTERNAL MEDICINE

## 2023-07-14 RX ORDER — LORAZEPAM 1 MG/1
1 TABLET ORAL NIGHTLY
Status: DISCONTINUED | OUTPATIENT
Start: 2023-07-14 | End: 2023-07-15

## 2023-07-14 RX ORDER — LURASIDONE HYDROCHLORIDE 40 MG/1
40 TABLET, FILM COATED ORAL
Status: DISCONTINUED | OUTPATIENT
Start: 2023-07-14 | End: 2023-07-15

## 2023-07-14 RX ORDER — INSULIN GLARGINE 100 [IU]/ML
35 INJECTION, SOLUTION SUBCUTANEOUS DAILY
Status: DISCONTINUED | OUTPATIENT
Start: 2023-07-14 | End: 2023-07-16 | Stop reason: HOSPADM

## 2023-07-14 RX ORDER — FONDAPARINUX SODIUM 10 MG/.8ML
10 INJECTION SUBCUTANEOUS DAILY
Status: DISCONTINUED | OUTPATIENT
Start: 2023-07-14 | End: 2023-07-16 | Stop reason: HOSPADM

## 2023-07-14 RX ADMIN — METOPROLOL TARTRATE 25 MG: 25 TABLET, FILM COATED ORAL at 21:33

## 2023-07-14 RX ADMIN — SODIUM CHLORIDE: 9 INJECTION, SOLUTION INTRAVENOUS at 06:42

## 2023-07-14 RX ADMIN — LORAZEPAM 3 MG: 1 TABLET ORAL at 15:11

## 2023-07-14 RX ADMIN — METOPROLOL TARTRATE 25 MG: 25 TABLET, FILM COATED ORAL at 08:19

## 2023-07-14 RX ADMIN — TOPIRAMATE 100 MG: 100 TABLET, FILM COATED ORAL at 21:32

## 2023-07-14 RX ADMIN — LORAZEPAM 3 MG: 1 TABLET ORAL at 17:53

## 2023-07-14 RX ADMIN — INSULIN LISPRO 4 UNITS: 100 INJECTION, SOLUTION INTRAVENOUS; SUBCUTANEOUS at 08:19

## 2023-07-14 RX ADMIN — GABAPENTIN 300 MG: 300 CAPSULE ORAL at 21:32

## 2023-07-14 RX ADMIN — THIAMINE HCL TAB 100 MG 100 MG: 100 TAB at 08:19

## 2023-07-14 RX ADMIN — GABAPENTIN 300 MG: 300 CAPSULE ORAL at 08:19

## 2023-07-14 RX ADMIN — BENZTROPINE MESYLATE 1 MG: 1 TABLET ORAL at 08:20

## 2023-07-14 RX ADMIN — FLUOXETINE 40 MG: 20 CAPSULE ORAL at 08:19

## 2023-07-14 RX ADMIN — INSULIN GLARGINE 35 UNITS: 100 INJECTION, SOLUTION SUBCUTANEOUS at 08:18

## 2023-07-14 RX ADMIN — SODIUM CHLORIDE: 9 INJECTION, SOLUTION INTRAVENOUS at 15:12

## 2023-07-14 RX ADMIN — BACITRACIN ZINC AND POLYMYXIN B SULFATE: at 23:49

## 2023-07-14 RX ADMIN — LORAZEPAM 1 MG: 1 TABLET ORAL at 21:32

## 2023-07-14 RX ADMIN — LURASIDONE HYDROCHLORIDE 40 MG: 40 TABLET, FILM COATED ORAL at 16:29

## 2023-07-14 RX ADMIN — GABAPENTIN 300 MG: 300 CAPSULE ORAL at 15:07

## 2023-07-14 RX ADMIN — HYDROCODONE BITARTRATE AND ACETAMINOPHEN 1 TABLET: 5; 325 TABLET ORAL at 12:40

## 2023-07-14 RX ADMIN — FOLIC ACID 1 MG: 1 TABLET ORAL at 08:19

## 2023-07-14 RX ADMIN — ACYCLOVIR 400 MG: 200 CAPSULE ORAL at 15:07

## 2023-07-14 RX ADMIN — HYDROCODONE BITARTRATE AND ACETAMINOPHEN 1 TABLET: 5; 325 TABLET ORAL at 18:48

## 2023-07-14 RX ADMIN — BACITRACIN ZINC AND POLYMYXIN B SULFATE 28.3 G: at 08:20

## 2023-07-14 RX ADMIN — ACYCLOVIR 400 MG: 200 CAPSULE ORAL at 08:19

## 2023-07-14 RX ADMIN — BENZTROPINE MESYLATE 1 MG: 1 TABLET ORAL at 21:33

## 2023-07-14 RX ADMIN — LORAZEPAM 2 MG: 1 TABLET ORAL at 16:29

## 2023-07-14 RX ADMIN — ENOXAPARIN SODIUM 30 MG: 100 INJECTION SUBCUTANEOUS at 08:18

## 2023-07-14 RX ADMIN — HYDROCODONE BITARTRATE AND ACETAMINOPHEN 1 TABLET: 5; 325 TABLET ORAL at 06:18

## 2023-07-14 RX ADMIN — LORAZEPAM 2 MG: 1 TABLET ORAL at 13:12

## 2023-07-14 RX ADMIN — ACYCLOVIR 400 MG: 200 CAPSULE ORAL at 21:34

## 2023-07-14 ASSESSMENT — ENCOUNTER SYMPTOMS
GASTROINTESTINAL NEGATIVE: 1
SHORTNESS OF BREATH: 0
BACK PAIN: 1
ALLERGIC/IMMUNOLOGIC NEGATIVE: 1
RESPIRATORY NEGATIVE: 1
ABDOMINAL PAIN: 0

## 2023-07-14 ASSESSMENT — PAIN DESCRIPTION - LOCATION
LOCATION: GENERALIZED;BACK
LOCATION: BACK
LOCATION: BACK

## 2023-07-14 ASSESSMENT — PAIN SCALES - GENERAL
PAINLEVEL_OUTOF10: 7
PAINLEVEL_OUTOF10: 0
PAINLEVEL_OUTOF10: 7
PAINLEVEL_OUTOF10: 7
PAINLEVEL_OUTOF10: 5

## 2023-07-14 ASSESSMENT — PAIN DESCRIPTION - ORIENTATION: ORIENTATION: LOWER

## 2023-07-14 ASSESSMENT — PAIN DESCRIPTION - DESCRIPTORS: DESCRIPTORS: ACHING;DISCOMFORT;NAGGING

## 2023-07-15 LAB
ALBUMIN SERPL-MCNC: 2.5 G/DL (ref 3.5–5.2)
ALP SERPL-CCNC: 215 U/L (ref 35–104)
ALT SERPL-CCNC: 52 U/L (ref 5–33)
ANION GAP SERPL CALCULATED.3IONS-SCNC: 11 MMOL/L (ref 9–17)
AST SERPL-CCNC: 56 U/L
BILIRUB SERPL-MCNC: 0.6 MG/DL (ref 0.3–1.2)
BUN SERPL-MCNC: 6 MG/DL (ref 6–20)
CALCIUM SERPL-MCNC: 8.1 MG/DL (ref 8.6–10.4)
CHLORIDE SERPL-SCNC: 108 MMOL/L (ref 98–107)
CO2 SERPL-SCNC: 22 MMOL/L (ref 20–31)
CREAT SERPL-MCNC: 0.6 MG/DL (ref 0.5–0.9)
ERYTHROCYTE [DISTWIDTH] IN BLOOD BY AUTOMATED COUNT: 14.5 % (ref 11.5–14.9)
GFR SERPL CREATININE-BSD FRML MDRD: >60 ML/MIN/1.73M2
GLUCOSE SERPL-MCNC: 101 MG/DL (ref 70–99)
HCT VFR BLD AUTO: 42.8 % (ref 36–46)
HGB BLD-MCNC: 14 G/DL (ref 12–16)
MCH RBC QN AUTO: 30.9 PG (ref 26–34)
MCHC RBC AUTO-ENTMCNC: 32.7 G/DL (ref 31–37)
MCV RBC AUTO: 94.6 FL (ref 80–100)
METER GLUCOSE: 102 MG/DL (ref 65–105)
METER GLUCOSE: 109 MG/DL (ref 65–105)
METER GLUCOSE: 166 MG/DL (ref 65–105)
METER GLUCOSE: 41 MG/DL (ref 65–105)
METER GLUCOSE: 63 MG/DL (ref 65–105)
METER GLUCOSE: 65 MG/DL (ref 65–105)
METER GLUCOSE: 71 MG/DL (ref 65–105)
METER GLUCOSE: 77 MG/DL (ref 65–105)
METER GLUCOSE: 78 MG/DL (ref 65–105)
METER GLUCOSE: 78 MG/DL (ref 65–105)
METER GLUCOSE: 97 MG/DL (ref 65–105)
PLATELET # BLD AUTO: ABNORMAL K/UL (ref 150–450)
PMV BLD AUTO: 12.3 FL (ref 6–12)
POTASSIUM SERPL-SCNC: 4.8 MMOL/L (ref 3.7–5.3)
PROT SERPL-MCNC: 5.8 G/DL (ref 6.4–8.3)
RBC # BLD AUTO: 4.53 M/UL (ref 4–5.2)
SODIUM SERPL-SCNC: 141 MMOL/L (ref 135–144)
WBC OTHER # BLD: 4.5 K/UL (ref 3.5–11)

## 2023-07-15 PROCEDURE — 99222 1ST HOSP IP/OBS MODERATE 55: CPT | Performed by: PSYCHIATRY & NEUROLOGY

## 2023-07-15 PROCEDURE — 6360000002 HC RX W HCPCS: Performed by: EMERGENCY MEDICINE

## 2023-07-15 PROCEDURE — 99232 SBSQ HOSP IP/OBS MODERATE 35: CPT | Performed by: INTERNAL MEDICINE

## 2023-07-15 PROCEDURE — 6370000000 HC RX 637 (ALT 250 FOR IP): Performed by: PSYCHIATRY & NEUROLOGY

## 2023-07-15 PROCEDURE — 2500000003 HC RX 250 WO HCPCS

## 2023-07-15 PROCEDURE — 6370000000 HC RX 637 (ALT 250 FOR IP): Performed by: NURSE PRACTITIONER

## 2023-07-15 PROCEDURE — 2580000003 HC RX 258: Performed by: EMERGENCY MEDICINE

## 2023-07-15 PROCEDURE — 1200000000 HC SEMI PRIVATE

## 2023-07-15 PROCEDURE — 6360000002 HC RX W HCPCS

## 2023-07-15 PROCEDURE — 85027 COMPLETE CBC AUTOMATED: CPT

## 2023-07-15 PROCEDURE — 82947 ASSAY GLUCOSE BLOOD QUANT: CPT

## 2023-07-15 PROCEDURE — 2580000003 HC RX 258: Performed by: NURSE PRACTITIONER

## 2023-07-15 PROCEDURE — 93005 ELECTROCARDIOGRAM TRACING: CPT

## 2023-07-15 PROCEDURE — 36415 COLL VENOUS BLD VENIPUNCTURE: CPT

## 2023-07-15 PROCEDURE — 80053 COMPREHEN METABOLIC PANEL: CPT

## 2023-07-15 PROCEDURE — 6370000000 HC RX 637 (ALT 250 FOR IP)

## 2023-07-15 RX ORDER — LURASIDONE HYDROCHLORIDE 60 MG/1
60 TABLET, FILM COATED ORAL
Qty: 30 TABLET | Refills: 0 | Status: ON HOLD | OUTPATIENT
Start: 2023-07-15

## 2023-07-15 RX ORDER — FONDAPARINUX SODIUM 10 MG/.8ML
10 INJECTION SUBCUTANEOUS DAILY
Qty: 4 ML | Refills: 1 | Status: ON HOLD | OUTPATIENT
Start: 2023-07-16

## 2023-07-15 RX ORDER — LORAZEPAM 0.5 MG/1
0.5 TABLET ORAL EVERY 4 HOURS PRN
Status: DISCONTINUED | OUTPATIENT
Start: 2023-07-15 | End: 2023-07-16 | Stop reason: HOSPADM

## 2023-07-15 RX ORDER — MAGNESIUM SULFATE HEPTAHYDRATE 40 MG/ML
2000 INJECTION, SOLUTION INTRAVENOUS ONCE
Status: COMPLETED | OUTPATIENT
Start: 2023-07-15 | End: 2023-07-15

## 2023-07-15 RX ORDER — LORAZEPAM 2 MG/ML
0.5 INJECTION INTRAMUSCULAR ONCE
Status: COMPLETED | OUTPATIENT
Start: 2023-07-15 | End: 2023-07-15

## 2023-07-15 RX ORDER — LURASIDONE HYDROCHLORIDE 60 MG/1
60 TABLET, FILM COATED ORAL
Status: DISCONTINUED | OUTPATIENT
Start: 2023-07-15 | End: 2023-07-16 | Stop reason: HOSPADM

## 2023-07-15 RX ADMIN — GABAPENTIN 300 MG: 300 CAPSULE ORAL at 15:00

## 2023-07-15 RX ADMIN — METOPROLOL TARTRATE 25 MG: 25 TABLET, FILM COATED ORAL at 08:27

## 2023-07-15 RX ADMIN — LURASIDONE HYDROCHLORIDE 60 MG: 60 TABLET, FILM COATED ORAL at 16:55

## 2023-07-15 RX ADMIN — GABAPENTIN 300 MG: 300 CAPSULE ORAL at 20:49

## 2023-07-15 RX ADMIN — MAGNESIUM SULFATE HEPTAHYDRATE 2000 MG: 40 INJECTION, SOLUTION INTRAVENOUS at 15:34

## 2023-07-15 RX ADMIN — GABAPENTIN 300 MG: 300 CAPSULE ORAL at 08:27

## 2023-07-15 RX ADMIN — SODIUM CHLORIDE, PRESERVATIVE FREE 10 ML: 5 INJECTION INTRAVENOUS at 13:03

## 2023-07-15 RX ADMIN — LORAZEPAM 1 MG: 2 INJECTION INTRAMUSCULAR; INTRAVENOUS at 08:48

## 2023-07-15 RX ADMIN — HYDROCODONE BITARTRATE AND ACETAMINOPHEN 1 TABLET: 5; 325 TABLET ORAL at 05:50

## 2023-07-15 RX ADMIN — ACYCLOVIR 400 MG: 200 CAPSULE ORAL at 09:10

## 2023-07-15 RX ADMIN — BACITRACIN ZINC AND POLYMYXIN B SULFATE 28.3 G: at 09:15

## 2023-07-15 RX ADMIN — SODIUM CHLORIDE, PRESERVATIVE FREE 10 ML: 5 INJECTION INTRAVENOUS at 20:47

## 2023-07-15 RX ADMIN — HYDROCODONE BITARTRATE AND ACETAMINOPHEN 1 TABLET: 5; 325 TABLET ORAL at 12:59

## 2023-07-15 RX ADMIN — SODIUM CHLORIDE, PRESERVATIVE FREE 10 ML: 5 INJECTION INTRAVENOUS at 09:15

## 2023-07-15 RX ADMIN — HYDROCODONE BITARTRATE AND ACETAMINOPHEN 1 TABLET: 5; 325 TABLET ORAL at 20:51

## 2023-07-15 RX ADMIN — BENZTROPINE MESYLATE 1 MG: 1 TABLET ORAL at 13:01

## 2023-07-15 RX ADMIN — SODIUM CHLORIDE, PRESERVATIVE FREE 10 ML: 5 INJECTION INTRAVENOUS at 18:21

## 2023-07-15 RX ADMIN — LORAZEPAM 0.5 MG: 0.5 TABLET ORAL at 15:00

## 2023-07-15 RX ADMIN — FOLIC ACID 1 MG: 1 TABLET ORAL at 08:27

## 2023-07-15 RX ADMIN — ACYCLOVIR 400 MG: 200 CAPSULE ORAL at 20:48

## 2023-07-15 RX ADMIN — BENZTROPINE MESYLATE 1 MG: 1 TABLET ORAL at 20:53

## 2023-07-15 RX ADMIN — ACYCLOVIR 400 MG: 200 CAPSULE ORAL at 15:00

## 2023-07-15 RX ADMIN — LORAZEPAM 0.5 MG: 2 INJECTION INTRAMUSCULAR; INTRAVENOUS at 18:06

## 2023-07-15 RX ADMIN — FONDAPARINUX SODIUM 10 MG: 10 INJECTION, SOLUTION SUBCUTANEOUS at 09:10

## 2023-07-15 RX ADMIN — BACITRACIN ZINC AND POLYMYXIN B SULFATE 28.3 G: at 20:46

## 2023-07-15 RX ADMIN — TOPIRAMATE 100 MG: 100 TABLET, FILM COATED ORAL at 20:49

## 2023-07-15 RX ADMIN — DEXTROSE MONOHYDRATE 125 ML: 100 INJECTION, SOLUTION INTRAVENOUS at 04:26

## 2023-07-15 RX ADMIN — THIAMINE HCL TAB 100 MG 100 MG: 100 TAB at 08:27

## 2023-07-15 ASSESSMENT — ENCOUNTER SYMPTOMS
COUGH: 0
ABDOMINAL PAIN: 0
SINUS PAIN: 0
SHORTNESS OF BREATH: 0
FACIAL SWELLING: 0
BLOOD IN STOOL: 0
SINUS PRESSURE: 0
EYES NEGATIVE: 1

## 2023-07-15 ASSESSMENT — PAIN SCALES - GENERAL
PAINLEVEL_OUTOF10: 4
PAINLEVEL_OUTOF10: 7
PAINLEVEL_OUTOF10: 6

## 2023-07-15 ASSESSMENT — PAIN DESCRIPTION - DESCRIPTORS
DESCRIPTORS: NAGGING
DESCRIPTORS: ACHING;SHARP;GNAWING

## 2023-07-15 ASSESSMENT — PAIN - FUNCTIONAL ASSESSMENT: PAIN_FUNCTIONAL_ASSESSMENT: PREVENTS OR INTERFERES SOME ACTIVE ACTIVITIES AND ADLS

## 2023-07-15 ASSESSMENT — PAIN DESCRIPTION - LOCATION
LOCATION: BACK;HIP
LOCATION: HIP;BACK
LOCATION: GENERALIZED

## 2023-07-15 ASSESSMENT — PAIN DESCRIPTION - ORIENTATION
ORIENTATION: LEFT
ORIENTATION: LEFT

## 2023-07-16 VITALS
SYSTOLIC BLOOD PRESSURE: 121 MMHG | OXYGEN SATURATION: 100 % | WEIGHT: 253.97 LBS | BODY MASS INDEX: 38.49 KG/M2 | HEART RATE: 106 BPM | DIASTOLIC BLOOD PRESSURE: 66 MMHG | RESPIRATION RATE: 16 BRPM | TEMPERATURE: 98.2 F | HEIGHT: 68 IN

## 2023-07-16 LAB
ALBUMIN SERPL-MCNC: 2.1 G/DL (ref 3.5–5.2)
ALP SERPL-CCNC: 180 U/L (ref 35–104)
ALT SERPL-CCNC: 37 U/L (ref 5–33)
ANION GAP SERPL CALCULATED.3IONS-SCNC: 9 MMOL/L (ref 9–17)
AST SERPL-CCNC: 39 U/L
BILIRUB SERPL-MCNC: 0.4 MG/DL (ref 0.3–1.2)
BUN SERPL-MCNC: 8 MG/DL (ref 6–20)
CALCIUM SERPL-MCNC: 7.9 MG/DL (ref 8.6–10.4)
CHLORIDE SERPL-SCNC: 105 MMOL/L (ref 98–107)
CO2 SERPL-SCNC: 23 MMOL/L (ref 20–31)
CREAT SERPL-MCNC: 0.6 MG/DL (ref 0.5–0.9)
ERYTHROCYTE [DISTWIDTH] IN BLOOD BY AUTOMATED COUNT: 15 % (ref 11.5–14.9)
GFR SERPL CREATININE-BSD FRML MDRD: >60 ML/MIN/1.73M2
GLUCOSE SERPL-MCNC: 167 MG/DL (ref 70–99)
HCT VFR BLD AUTO: 40.4 % (ref 36–46)
HGB BLD-MCNC: 13.2 G/DL (ref 12–16)
MCH RBC QN AUTO: 31 PG (ref 26–34)
MCHC RBC AUTO-ENTMCNC: 32.7 G/DL (ref 31–37)
MCV RBC AUTO: 94.8 FL (ref 80–100)
METER GLUCOSE: 141 MG/DL (ref 65–105)
METER GLUCOSE: 159 MG/DL (ref 65–105)
PLATELET # BLD AUTO: 176 K/UL (ref 150–450)
PMV BLD AUTO: 10.4 FL (ref 6–12)
POTASSIUM SERPL-SCNC: 4.6 MMOL/L (ref 3.7–5.3)
PROT SERPL-MCNC: 5.1 G/DL (ref 6.4–8.3)
RBC # BLD AUTO: 4.26 M/UL (ref 4–5.2)
SODIUM SERPL-SCNC: 137 MMOL/L (ref 135–144)
WBC OTHER # BLD: 4.3 K/UL (ref 3.5–11)

## 2023-07-16 PROCEDURE — 82947 ASSAY GLUCOSE BLOOD QUANT: CPT

## 2023-07-16 PROCEDURE — 6360000002 HC RX W HCPCS: Performed by: NURSE PRACTITIONER

## 2023-07-16 PROCEDURE — 2580000003 HC RX 258: Performed by: NURSE PRACTITIONER

## 2023-07-16 PROCEDURE — 6360000002 HC RX W HCPCS

## 2023-07-16 PROCEDURE — 36415 COLL VENOUS BLD VENIPUNCTURE: CPT

## 2023-07-16 PROCEDURE — 6370000000 HC RX 637 (ALT 250 FOR IP): Performed by: NURSE PRACTITIONER

## 2023-07-16 PROCEDURE — 99239 HOSP IP/OBS DSCHRG MGMT >30: CPT | Performed by: INTERNAL MEDICINE

## 2023-07-16 PROCEDURE — 80053 COMPREHEN METABOLIC PANEL: CPT

## 2023-07-16 PROCEDURE — 6370000000 HC RX 637 (ALT 250 FOR IP)

## 2023-07-16 PROCEDURE — 85027 COMPLETE CBC AUTOMATED: CPT

## 2023-07-16 PROCEDURE — 2580000003 HC RX 258: Performed by: EMERGENCY MEDICINE

## 2023-07-16 RX ORDER — INSULIN GLARGINE 300 U/ML
30 INJECTION, SOLUTION SUBCUTANEOUS DAILY
Qty: 1 ADJUSTABLE DOSE PRE-FILLED PEN SYRINGE | Refills: 0 | Status: ON HOLD | OUTPATIENT
Start: 2023-07-16

## 2023-07-16 RX ORDER — HYDROCODONE BITARTRATE AND ACETAMINOPHEN 5; 325 MG/1; MG/1
1 TABLET ORAL EVERY 12 HOURS PRN
Qty: 10 TABLET | Refills: 0 | Status: ON HOLD | OUTPATIENT
Start: 2023-07-16 | End: 2023-07-21

## 2023-07-16 RX ADMIN — BACITRACIN ZINC AND POLYMYXIN B SULFATE 28.3 G: at 07:54

## 2023-07-16 RX ADMIN — THIAMINE HCL TAB 100 MG 100 MG: 100 TAB at 07:58

## 2023-07-16 RX ADMIN — FONDAPARINUX SODIUM 10 MG: 10 INJECTION, SOLUTION SUBCUTANEOUS at 08:01

## 2023-07-16 RX ADMIN — PROCHLORPERAZINE EDISYLATE 10 MG: 5 INJECTION INTRAMUSCULAR; INTRAVENOUS at 07:50

## 2023-07-16 RX ADMIN — FOLIC ACID 1 MG: 1 TABLET ORAL at 07:58

## 2023-07-16 RX ADMIN — GABAPENTIN 300 MG: 300 CAPSULE ORAL at 12:44

## 2023-07-16 RX ADMIN — MIDODRINE HYDROCHLORIDE 2.5 MG: 2.5 TABLET ORAL at 03:09

## 2023-07-16 RX ADMIN — ACYCLOVIR 400 MG: 200 CAPSULE ORAL at 08:02

## 2023-07-16 RX ADMIN — GABAPENTIN 300 MG: 300 CAPSULE ORAL at 07:58

## 2023-07-16 RX ADMIN — SODIUM CHLORIDE, PRESERVATIVE FREE 10 ML: 5 INJECTION INTRAVENOUS at 07:51

## 2023-07-16 RX ADMIN — SODIUM CHLORIDE, PRESERVATIVE FREE 10 ML: 5 INJECTION INTRAVENOUS at 08:07

## 2023-07-16 RX ADMIN — HYDROCODONE BITARTRATE AND ACETAMINOPHEN 1 TABLET: 5; 325 TABLET ORAL at 12:45

## 2023-07-16 RX ADMIN — ACYCLOVIR 400 MG: 200 CAPSULE ORAL at 13:53

## 2023-07-16 RX ADMIN — LORAZEPAM 0.5 MG: 0.5 TABLET ORAL at 12:44

## 2023-07-16 RX ADMIN — HYDROCODONE BITARTRATE AND ACETAMINOPHEN 1 TABLET: 5; 325 TABLET ORAL at 06:32

## 2023-07-16 RX ADMIN — LORAZEPAM 0.5 MG: 0.5 TABLET ORAL at 05:03

## 2023-07-16 RX ADMIN — METOPROLOL TARTRATE 25 MG: 25 TABLET, FILM COATED ORAL at 07:58

## 2023-07-16 RX ADMIN — PANTOPRAZOLE SODIUM 40 MG: 40 TABLET, DELAYED RELEASE ORAL at 07:58

## 2023-07-16 RX ADMIN — BENZTROPINE MESYLATE 1 MG: 1 TABLET ORAL at 08:02

## 2023-07-16 RX ADMIN — LORAZEPAM 0.5 MG: 0.5 TABLET ORAL at 08:08

## 2023-07-16 ASSESSMENT — ENCOUNTER SYMPTOMS
ABDOMINAL PAIN: 0
BLOOD IN STOOL: 0
CHEST TIGHTNESS: 0
COUGH: 0
CONSTIPATION: 0
DIARRHEA: 0
WHEEZING: 0
VOMITING: 0
SHORTNESS OF BREATH: 0
NAUSEA: 0

## 2023-07-16 ASSESSMENT — PAIN DESCRIPTION - DESCRIPTORS: DESCRIPTORS: ACHING

## 2023-07-16 ASSESSMENT — PAIN DESCRIPTION - LOCATION
LOCATION: HIP;BACK
LOCATION: BACK;HIP

## 2023-07-16 ASSESSMENT — PAIN SCALES - GENERAL
PAINLEVEL_OUTOF10: 7
PAINLEVEL_OUTOF10: 7

## 2023-07-16 ASSESSMENT — PAIN - FUNCTIONAL ASSESSMENT: PAIN_FUNCTIONAL_ASSESSMENT: PREVENTS OR INTERFERES SOME ACTIVE ACTIVITIES AND ADLS

## 2023-07-16 ASSESSMENT — PAIN DESCRIPTION - ORIENTATION: ORIENTATION: LEFT

## 2023-07-17 ENCOUNTER — CARE COORDINATION (OUTPATIENT)
Dept: CASE MANAGEMENT | Age: 43
End: 2023-07-17

## 2023-07-17 LAB
EKG ATRIAL RATE: 80 BPM
EKG P AXIS: 50 DEGREES
EKG P-R INTERVAL: 132 MS
EKG Q-T INTERVAL: 414 MS
EKG QRS DURATION: 80 MS
EKG QTC CALCULATION (BAZETT): 477 MS
EKG R AXIS: 61 DEGREES
EKG T AXIS: 44 DEGREES
EKG VENTRICULAR RATE: 80 BPM

## 2023-07-17 PROCEDURE — 93010 ELECTROCARDIOGRAM REPORT: CPT | Performed by: INTERNAL MEDICINE

## 2023-07-17 NOTE — CARE COORDINATION
Care Transitions Outreach Attempt    Call within 2 business days of discharge: Yes   Attempted to reach patient for transitions of care follow up. Unable to reach patient. Patient: Cris Quiroz Patient : 1980 MRN: [id number]    Last Discharge [address]       Date Complaint Diagnosis Description Type Department Provider    23 Hyperglycemia; Alcohol Intoxication Acute alcoholic intoxication without complication ([address]) . .. ED to Hosp-Admission (Discharged) (ADMITTED) Latisha Mayorga MD; Shyam Cesar. .. # 1 attempt-Attempted initial 24 hour hospital follow up call. Left a Hipaa compliant message with name and call back information. Requested return call to [phone]. Writer contacted Malvin Salgado left vm message for [facility]     Was this an external facility discharge?  No Discharge Facility: [facility]    Noted following upcoming appointments from discharge chart review:   [facility] follow up appointment(s):   Future Appointments   Date Time Provider [address] [place] [address]   2023 10:00 AM BERNARDA Leal - CNP [facility]   2023  8:30 AM Umm Scott MD [facility] MELANY SHEA     Non-[facility] follow up appointment(s):

## 2023-07-18 ENCOUNTER — APPOINTMENT (OUTPATIENT)
Dept: CT IMAGING | Age: 43
DRG: 522 | End: 2023-07-18
Payer: MEDICARE

## 2023-07-18 ENCOUNTER — CARE COORDINATION (OUTPATIENT)
Dept: CASE MANAGEMENT | Age: 43
End: 2023-07-18

## 2023-07-18 ENCOUNTER — HOSPITAL ENCOUNTER (INPATIENT)
Age: 43
LOS: 5 days | Discharge: NURSING, REHAB OR LONG-TERM CARE | DRG: 522 | End: 2023-07-24
Attending: STUDENT IN AN ORGANIZED HEALTH CARE EDUCATION/TRAINING PROGRAM | Admitting: STUDENT IN AN ORGANIZED HEALTH CARE EDUCATION/TRAINING PROGRAM
Payer: MEDICARE

## 2023-07-18 DIAGNOSIS — R29.6 FREQUENT FALLS: ICD-10-CM

## 2023-07-18 DIAGNOSIS — M54.6 MIDLINE THORACIC BACK PAIN, UNSPECIFIED CHRONICITY: ICD-10-CM

## 2023-07-18 DIAGNOSIS — F10.10 ALCOHOL ABUSE: ICD-10-CM

## 2023-07-18 DIAGNOSIS — R73.9 HYPERGLYCEMIA: ICD-10-CM

## 2023-07-18 DIAGNOSIS — S72.002A CLOSED FRACTURE OF LEFT HIP, INITIAL ENCOUNTER (HCC): Primary | ICD-10-CM

## 2023-07-18 LAB
ALBUMIN SERPL-MCNC: 2.5 G/DL (ref 3.5–5.2)
ALP SERPL-CCNC: 192 U/L (ref 35–104)
ALT SERPL-CCNC: 49 U/L (ref 5–33)
AMPHET UR QL SCN: NEGATIVE
ANION GAP SERPL CALCULATED.3IONS-SCNC: 15 MMOL/L (ref 9–17)
AST SERPL-CCNC: 71 U/L
B-OH-BUTYR SERPL-MCNC: 0.15 MMOL/L (ref 0.02–0.27)
BACTERIA URNS QL MICRO: NORMAL
BARBITURATES UR QL SCN: POSITIVE
BASOPHILS # BLD: 0 K/UL (ref 0–0.2)
BASOPHILS NFR BLD: 0 % (ref 0–2)
BENZODIAZ UR QL: NEGATIVE
BILIRUB SERPL-MCNC: 0.3 MG/DL (ref 0.3–1.2)
BILIRUB UR QL STRIP: NEGATIVE
BODY TEMPERATURE: 37
BUN SERPL-MCNC: <2 MG/DL (ref 6–20)
CALCIUM SERPL-MCNC: 7.9 MG/DL (ref 8.6–10.4)
CANNABINOIDS UR QL SCN: NEGATIVE
CASTS #/AREA URNS LPF: NORMAL /LPF
CHLORIDE SERPL-SCNC: 105 MMOL/L (ref 98–107)
CK SERPL-CCNC: 656 U/L (ref 26–192)
CLARITY UR: CLEAR
CO2 SERPL-SCNC: 19 MMOL/L (ref 20–31)
COCAINE UR QL SCN: NEGATIVE
COHGB MFR BLD: 1.5 % (ref 0–5)
COLOR UR: YELLOW
CREAT SERPL-MCNC: 0.6 MG/DL (ref 0.5–0.9)
EOSINOPHIL # BLD: 0 K/UL (ref 0–0.4)
EOSINOPHILS RELATIVE PERCENT: 0 % (ref 0–4)
EPI CELLS #/AREA URNS HPF: NORMAL /HPF
ERYTHROCYTE [DISTWIDTH] IN BLOOD BY AUTOMATED COUNT: 15.2 % (ref 11.5–14.9)
ETHANOL PERCENT: 0.28 %
ETHANOLAMINE SERPL-MCNC: 276 MG/DL
FENTANYL UR QL: NEGATIVE
GFR SERPL CREATININE-BSD FRML MDRD: >60 ML/MIN/1.73M2
GLUCOSE SERPL-MCNC: 559 MG/DL (ref 70–99)
GLUCOSE UR STRIP-MCNC: ABNORMAL MG/DL
HCO3 VENOUS: 20.8 MMOL/L (ref 24–30)
HCT VFR BLD AUTO: 36.4 % (ref 36–46)
HGB BLD-MCNC: 12.2 G/DL (ref 12–16)
HGB UR QL STRIP.AUTO: ABNORMAL
INR PPP: 1.1
KETONES UR STRIP-MCNC: NEGATIVE MG/DL
LEUKOCYTE ESTERASE UR QL STRIP: NEGATIVE
LYMPHOCYTES # BLD: 16 % (ref 24–44)
LYMPHOCYTES NFR BLD: 1.1 K/UL (ref 1–4.8)
MAGNESIUM SERPL-MCNC: 2.2 MG/DL (ref 1.6–2.6)
MCH RBC QN AUTO: 32.4 PG (ref 26–34)
MCHC RBC AUTO-ENTMCNC: 33.7 G/DL (ref 31–37)
MCV RBC AUTO: 96.3 FL (ref 80–100)
METER GLUCOSE: 555 MG/DL (ref 65–105)
METHADONE UR QL: NEGATIVE
METHEMOGLOBIN: 0.2 % (ref 0–1.9)
MONOCYTES NFR BLD: 0.3 K/UL (ref 0.1–1.3)
MONOCYTES NFR BLD: 5 % (ref 1–7)
NEGATIVE BASE EXCESS, VEN: 5 MMOL/L (ref 0–2)
NEUTROPHILS NFR BLD: 79 % (ref 36–66)
NEUTS SEG NFR BLD: 5.3 K/UL (ref 1.3–9.1)
NITRITE UR QL STRIP: NEGATIVE
O2 SAT, VEN: 82.1 % (ref 60–85)
OPIATES UR QL SCN: NEGATIVE
OXYCODONE UR QL SCN: NEGATIVE
PCO2, VEN: 38.6 MM HG (ref 39–55)
PCP UR QL SCN: NEGATIVE
PH UR STRIP: 5 [PH] (ref 5–8)
PH VENOUS: 7.34 (ref 7.32–7.42)
PLATELET # BLD AUTO: 208 K/UL (ref 150–450)
PMV BLD AUTO: 10.1 FL (ref 6–12)
PO2, VEN: 55.7 MM HG (ref 30–50)
POTASSIUM SERPL-SCNC: 4.6 MMOL/L (ref 3.7–5.3)
PROT SERPL-MCNC: 5.7 G/DL (ref 6.4–8.3)
PROT UR STRIP-MCNC: NEGATIVE MG/DL
PROTHROMBIN TIME: 14.4 SEC (ref 11.8–14.6)
PT. POSITION: ABNORMAL
RBC # BLD AUTO: 3.78 M/UL (ref 4–5.2)
RBC #/AREA URNS HPF: NORMAL /HPF
SODIUM SERPL-SCNC: 139 MMOL/L (ref 135–144)
SP GR UR STRIP: 1.02 (ref 1–1.03)
TEST INFORMATION: ABNORMAL
TEXT FOR RESPIRATORY: ABNORMAL
TROPONIN I SERPL HS-MCNC: 16 NG/L (ref 0–14)
UROBILINOGEN UR STRIP-ACNC: NORMAL EU/DL (ref 0–1)
WBC #/AREA URNS HPF: NORMAL /HPF
WBC OTHER # BLD: 6.8 K/UL (ref 3.5–11)

## 2023-07-18 PROCEDURE — G0480 DRUG TEST DEF 1-7 CLASSES: HCPCS

## 2023-07-18 PROCEDURE — 83735 ASSAY OF MAGNESIUM: CPT

## 2023-07-18 PROCEDURE — 80053 COMPREHEN METABOLIC PANEL: CPT

## 2023-07-18 PROCEDURE — 96361 HYDRATE IV INFUSION ADD-ON: CPT

## 2023-07-18 PROCEDURE — 70486 CT MAXILLOFACIAL W/O DYE: CPT

## 2023-07-18 PROCEDURE — 96360 HYDRATION IV INFUSION INIT: CPT

## 2023-07-18 PROCEDURE — 99285 EMERGENCY DEPT VISIT HI MDM: CPT

## 2023-07-18 PROCEDURE — 6370000000 HC RX 637 (ALT 250 FOR IP): Performed by: STUDENT IN AN ORGANIZED HEALTH CARE EDUCATION/TRAINING PROGRAM

## 2023-07-18 PROCEDURE — 93005 ELECTROCARDIOGRAM TRACING: CPT | Performed by: STUDENT IN AN ORGANIZED HEALTH CARE EDUCATION/TRAINING PROGRAM

## 2023-07-18 PROCEDURE — 70450 CT HEAD/BRAIN W/O DYE: CPT

## 2023-07-18 PROCEDURE — 82010 KETONE BODYS QUAN: CPT

## 2023-07-18 PROCEDURE — 82805 BLOOD GASES W/O2 SATURATION: CPT

## 2023-07-18 PROCEDURE — 80307 DRUG TEST PRSMV CHEM ANLYZR: CPT

## 2023-07-18 PROCEDURE — 74177 CT ABD & PELVIS W/CONTRAST: CPT

## 2023-07-18 PROCEDURE — 82550 ASSAY OF CK (CPK): CPT

## 2023-07-18 PROCEDURE — 85610 PROTHROMBIN TIME: CPT

## 2023-07-18 PROCEDURE — 36415 COLL VENOUS BLD VENIPUNCTURE: CPT

## 2023-07-18 PROCEDURE — 82947 ASSAY GLUCOSE BLOOD QUANT: CPT

## 2023-07-18 PROCEDURE — 72128 CT CHEST SPINE W/O DYE: CPT

## 2023-07-18 PROCEDURE — 72125 CT NECK SPINE W/O DYE: CPT

## 2023-07-18 PROCEDURE — 6360000004 HC RX CONTRAST MEDICATION: Performed by: STUDENT IN AN ORGANIZED HEALTH CARE EDUCATION/TRAINING PROGRAM

## 2023-07-18 PROCEDURE — 72131 CT LUMBAR SPINE W/O DYE: CPT

## 2023-07-18 PROCEDURE — 81001 URINALYSIS AUTO W/SCOPE: CPT

## 2023-07-18 PROCEDURE — 85027 COMPLETE CBC AUTOMATED: CPT

## 2023-07-18 PROCEDURE — 96372 THER/PROPH/DIAG INJ SC/IM: CPT

## 2023-07-18 PROCEDURE — 84484 ASSAY OF TROPONIN QUANT: CPT

## 2023-07-18 PROCEDURE — 2580000003 HC RX 258: Performed by: STUDENT IN AN ORGANIZED HEALTH CARE EDUCATION/TRAINING PROGRAM

## 2023-07-18 RX ORDER — 0.9 % SODIUM CHLORIDE 0.9 %
1000 INTRAVENOUS SOLUTION INTRAVENOUS ONCE
Status: COMPLETED | OUTPATIENT
Start: 2023-07-18 | End: 2023-07-19

## 2023-07-18 RX ORDER — SODIUM CHLORIDE 0.9 % (FLUSH) 0.9 %
10 SYRINGE (ML) INJECTION AS NEEDED
Status: DISCONTINUED | OUTPATIENT
Start: 2023-07-18 | End: 2023-07-24 | Stop reason: HOSPADM

## 2023-07-18 RX ORDER — 0.9 % SODIUM CHLORIDE 0.9 %
100 INTRAVENOUS SOLUTION INTRAVENOUS ONCE
Status: COMPLETED | OUTPATIENT
Start: 2023-07-18 | End: 2023-07-19

## 2023-07-18 RX ORDER — INSULIN LISPRO 100 [IU]/ML
12 INJECTION, SOLUTION INTRAVENOUS; SUBCUTANEOUS ONCE
Status: COMPLETED | OUTPATIENT
Start: 2023-07-18 | End: 2023-07-18

## 2023-07-18 RX ADMIN — SODIUM CHLORIDE 1000 ML: 9 INJECTION, SOLUTION INTRAVENOUS at 22:16

## 2023-07-18 RX ADMIN — SODIUM CHLORIDE 100 ML: 9 INJECTION, SOLUTION INTRAVENOUS at 23:14

## 2023-07-18 RX ADMIN — IOPAMIDOL 75 ML: 755 INJECTION, SOLUTION INTRAVENOUS at 23:14

## 2023-07-18 RX ADMIN — SODIUM CHLORIDE 1000 ML: 9 INJECTION, SOLUTION INTRAVENOUS at 23:28

## 2023-07-18 RX ADMIN — INSULIN LISPRO 12 UNITS: 100 INJECTION, SOLUTION INTRAVENOUS; SUBCUTANEOUS at 23:25

## 2023-07-18 RX ADMIN — SODIUM CHLORIDE, PRESERVATIVE FREE 10 ML: 5 INJECTION INTRAVENOUS at 23:14

## 2023-07-18 ASSESSMENT — PAIN - FUNCTIONAL ASSESSMENT: PAIN_FUNCTIONAL_ASSESSMENT: FACE, LEGS, ACTIVITY, CRY, AND CONSOLABILITY (FLACC)

## 2023-07-18 NOTE — CARE COORDINATION
Care Transitions Outreach Attempt #2    Call within 2 business days of discharge: Yes   Attempt #2 to reach patient for transitions of care follow up. Unable to reach patient. Left message requesting call back. Non [facility] PCP. CTN sign off for transitions. Patient: Cecelia Vang Patient : 1980 MRN: [id number]    Last Discharge [address]       Date Complaint Diagnosis Description Type Department Provider    23 Hyperglycemia; Alcohol Intoxication Acute alcoholic intoxication without complication ([address]) . .. ED to Hosp-Admission (Discharged) (ADMITTED) Tiffani Veliz MD; Gloria Jordan. .. Was this an external facility discharge? No Discharge Facility: [facility]    Noted following upcoming appointments from discharge chart review:   [facility] follow up appointment(s):   Future Appointments   Date Time Provider [address]   2023 10:00 AM [address], APRN - CNP [facility]   2023  8:30 AM Melissa Barker MD [facility] [address] [facility], [address] Transitions Nurse  [phone]   [email or web address]

## 2023-07-19 ENCOUNTER — ANESTHESIA EVENT (OUTPATIENT)
Dept: OPERATING ROOM | Age: 43
End: 2023-07-19
Payer: MEDICARE

## 2023-07-19 ENCOUNTER — APPOINTMENT (OUTPATIENT)
Dept: GENERAL RADIOLOGY | Age: 43
DRG: 522 | End: 2023-07-19
Payer: MEDICARE

## 2023-07-19 ENCOUNTER — ANESTHESIA (OUTPATIENT)
Dept: OPERATING ROOM | Age: 43
End: 2023-07-19
Payer: MEDICARE

## 2023-07-19 PROBLEM — S72.002A CLOSED LEFT HIP FRACTURE, INITIAL ENCOUNTER (HCC): Status: ACTIVE | Noted: 2023-07-19

## 2023-07-19 LAB
EKG ATRIAL RATE: 123 BPM
EKG P AXIS: 61 DEGREES
EKG P-R INTERVAL: 136 MS
EKG Q-T INTERVAL: 344 MS
EKG QRS DURATION: 86 MS
EKG QTC CALCULATION (BAZETT): 492 MS
EKG R AXIS: 57 DEGREES
EKG T AXIS: 37 DEGREES
EKG VENTRICULAR RATE: 123 BPM
HCG UR QL: NEGATIVE
METER GLUCOSE: 112 MG/DL (ref 65–105)
METER GLUCOSE: 218 MG/DL (ref 65–105)
METER GLUCOSE: 225 MG/DL (ref 65–105)
METER GLUCOSE: 290 MG/DL (ref 65–105)
METER GLUCOSE: 98 MG/DL (ref 65–105)
TROPONIN I SERPL HS-MCNC: 17 NG/L (ref 0–14)

## 2023-07-19 PROCEDURE — 2709999900 HC NON-CHARGEABLE SUPPLY: Performed by: STUDENT IN AN ORGANIZED HEALTH CARE EDUCATION/TRAINING PROGRAM

## 2023-07-19 PROCEDURE — 93010 ELECTROCARDIOGRAM REPORT: CPT | Performed by: INTERNAL MEDICINE

## 2023-07-19 PROCEDURE — 2060000000 HC ICU INTERMEDIATE R&B

## 2023-07-19 PROCEDURE — 2500000003 HC RX 250 WO HCPCS

## 2023-07-19 PROCEDURE — 2500000003 HC RX 250 WO HCPCS: Performed by: ANESTHESIOLOGY

## 2023-07-19 PROCEDURE — 99223 1ST HOSP IP/OBS HIGH 75: CPT | Performed by: STUDENT IN AN ORGANIZED HEALTH CARE EDUCATION/TRAINING PROGRAM

## 2023-07-19 PROCEDURE — 82947 ASSAY GLUCOSE BLOOD QUANT: CPT

## 2023-07-19 PROCEDURE — 2580000003 HC RX 258: Performed by: STUDENT IN AN ORGANIZED HEALTH CARE EDUCATION/TRAINING PROGRAM

## 2023-07-19 PROCEDURE — 6370000000 HC RX 637 (ALT 250 FOR IP): Performed by: INTERNAL MEDICINE

## 2023-07-19 PROCEDURE — 73501 X-RAY EXAM HIP UNI 1 VIEW: CPT

## 2023-07-19 PROCEDURE — C9399 UNCLASSIFIED DRUGS OR BIOLOG: HCPCS | Performed by: ANESTHESIOLOGY

## 2023-07-19 PROCEDURE — 7100000000 HC PACU RECOVERY - FIRST 15 MIN: Performed by: STUDENT IN AN ORGANIZED HEALTH CARE EDUCATION/TRAINING PROGRAM

## 2023-07-19 PROCEDURE — 3700000000 HC ANESTHESIA ATTENDED CARE: Performed by: STUDENT IN AN ORGANIZED HEALTH CARE EDUCATION/TRAINING PROGRAM

## 2023-07-19 PROCEDURE — 0SRS0JZ REPLACEMENT OF LEFT HIP JOINT, FEMORAL SURFACE WITH SYNTHETIC SUBSTITUTE, OPEN APPROACH: ICD-10-PCS | Performed by: STUDENT IN AN ORGANIZED HEALTH CARE EDUCATION/TRAINING PROGRAM

## 2023-07-19 PROCEDURE — 73552 X-RAY EXAM OF FEMUR 2/>: CPT

## 2023-07-19 PROCEDURE — 84484 ASSAY OF TROPONIN QUANT: CPT

## 2023-07-19 PROCEDURE — C1713 ANCHOR/SCREW BN/BN,TIS/BN: HCPCS | Performed by: STUDENT IN AN ORGANIZED HEALTH CARE EDUCATION/TRAINING PROGRAM

## 2023-07-19 PROCEDURE — 6360000002 HC RX W HCPCS: Performed by: STUDENT IN AN ORGANIZED HEALTH CARE EDUCATION/TRAINING PROGRAM

## 2023-07-19 PROCEDURE — 81025 URINE PREGNANCY TEST: CPT

## 2023-07-19 PROCEDURE — 7100000001 HC PACU RECOVERY - ADDTL 15 MIN: Performed by: STUDENT IN AN ORGANIZED HEALTH CARE EDUCATION/TRAINING PROGRAM

## 2023-07-19 PROCEDURE — A4216 STERILE WATER/SALINE, 10 ML: HCPCS | Performed by: NURSE PRACTITIONER

## 2023-07-19 PROCEDURE — 3600000014 HC SURGERY LEVEL 4 ADDTL 15MIN: Performed by: STUDENT IN AN ORGANIZED HEALTH CARE EDUCATION/TRAINING PROGRAM

## 2023-07-19 PROCEDURE — 6360000002 HC RX W HCPCS: Performed by: NURSE PRACTITIONER

## 2023-07-19 PROCEDURE — 73502 X-RAY EXAM HIP UNI 2-3 VIEWS: CPT

## 2023-07-19 PROCEDURE — 6360000002 HC RX W HCPCS: Performed by: ANESTHESIOLOGY

## 2023-07-19 PROCEDURE — 3600000004 HC SURGERY LEVEL 4 BASE: Performed by: STUDENT IN AN ORGANIZED HEALTH CARE EDUCATION/TRAINING PROGRAM

## 2023-07-19 PROCEDURE — 2580000003 HC RX 258: Performed by: ANESTHESIOLOGY

## 2023-07-19 PROCEDURE — 2500000003 HC RX 250 WO HCPCS: Performed by: NURSE PRACTITIONER

## 2023-07-19 PROCEDURE — 96374 THER/PROPH/DIAG INJ IV PUSH: CPT

## 2023-07-19 PROCEDURE — 99223 1ST HOSP IP/OBS HIGH 75: CPT | Performed by: INTERNAL MEDICINE

## 2023-07-19 PROCEDURE — C1776 JOINT DEVICE (IMPLANTABLE): HCPCS | Performed by: STUDENT IN AN ORGANIZED HEALTH CARE EDUCATION/TRAINING PROGRAM

## 2023-07-19 PROCEDURE — 36415 COLL VENOUS BLD VENIPUNCTURE: CPT

## 2023-07-19 PROCEDURE — 3700000001 HC ADD 15 MINUTES (ANESTHESIA): Performed by: STUDENT IN AN ORGANIZED HEALTH CARE EDUCATION/TRAINING PROGRAM

## 2023-07-19 PROCEDURE — C9113 INJ PANTOPRAZOLE SODIUM, VIA: HCPCS | Performed by: NURSE PRACTITIONER

## 2023-07-19 PROCEDURE — 6370000000 HC RX 637 (ALT 250 FOR IP): Performed by: STUDENT IN AN ORGANIZED HEALTH CARE EDUCATION/TRAINING PROGRAM

## 2023-07-19 PROCEDURE — 2580000003 HC RX 258: Performed by: NURSE PRACTITIONER

## 2023-07-19 PROCEDURE — 94761 N-INVAS EAR/PLS OXIMETRY MLT: CPT

## 2023-07-19 PROCEDURE — 1200000000 HC SEMI PRIVATE

## 2023-07-19 PROCEDURE — 27236 TREAT THIGH FRACTURE: CPT | Performed by: STUDENT IN AN ORGANIZED HEALTH CARE EDUCATION/TRAINING PROGRAM

## 2023-07-19 DEVICE — IMPLANTABLE DEVICE: Type: IMPLANTABLE DEVICE | Site: HIP | Status: FUNCTIONAL

## 2023-07-19 DEVICE — HEAD BPLR OD49MM ID28MM FEM HIP SELF CNTR: Type: IMPLANTABLE DEVICE | Site: HIP | Status: FUNCTIONAL

## 2023-07-19 DEVICE — IMPL HIP FEM HEAD TAPR 12/14 28MM +5: Type: IMPLANTABLE DEVICE | Site: HIP | Status: FUNCTIONAL

## 2023-07-19 RX ORDER — FENTANYL CITRATE 0.05 MG/ML
25 INJECTION, SOLUTION INTRAMUSCULAR; INTRAVENOUS EVERY 5 MIN PRN
Status: DISCONTINUED | OUTPATIENT
Start: 2023-07-19 | End: 2023-07-19 | Stop reason: HOSPADM

## 2023-07-19 RX ORDER — METOPROLOL TARTRATE 5 MG/5ML
2.5 INJECTION INTRAVENOUS EVERY 6 HOURS
Status: DISCONTINUED | OUTPATIENT
Start: 2023-07-19 | End: 2023-07-20

## 2023-07-19 RX ORDER — SODIUM CHLORIDE 0.9 % (FLUSH) 0.9 %
5-40 SYRINGE (ML) INJECTION EVERY 12 HOURS SCHEDULED
Status: DISCONTINUED | OUTPATIENT
Start: 2023-07-19 | End: 2023-07-19 | Stop reason: HOSPADM

## 2023-07-19 RX ORDER — ONDANSETRON 2 MG/ML
4 INJECTION INTRAMUSCULAR; INTRAVENOUS
Status: DISCONTINUED | OUTPATIENT
Start: 2023-07-19 | End: 2023-07-19 | Stop reason: HOSPADM

## 2023-07-19 RX ORDER — LORAZEPAM 1 MG/1
4 TABLET ORAL
Status: DISCONTINUED | OUTPATIENT
Start: 2023-07-19 | End: 2023-07-24 | Stop reason: HOSPADM

## 2023-07-19 RX ORDER — SODIUM CHLORIDE 0.9 % (FLUSH) 0.9 %
5-40 SYRINGE (ML) INJECTION PRN
Status: DISCONTINUED | OUTPATIENT
Start: 2023-07-19 | End: 2023-07-19 | Stop reason: HOSPADM

## 2023-07-19 RX ORDER — FOLIC ACID 1 MG/1
1 TABLET ORAL DAILY
Status: DISCONTINUED | OUTPATIENT
Start: 2023-07-19 | End: 2023-07-24 | Stop reason: HOSPADM

## 2023-07-19 RX ORDER — ROCURONIUM BROMIDE 10 MG/ML
INJECTION, SOLUTION INTRAVENOUS PRN
Status: DISCONTINUED | OUTPATIENT
Start: 2023-07-19 | End: 2023-07-19 | Stop reason: SDUPTHER

## 2023-07-19 RX ORDER — FENTANYL CITRATE 50 UG/ML
INJECTION, SOLUTION INTRAMUSCULAR; INTRAVENOUS PRN
Status: DISCONTINUED | OUTPATIENT
Start: 2023-07-19 | End: 2023-07-19 | Stop reason: SDUPTHER

## 2023-07-19 RX ORDER — LIDOCAINE HYDROCHLORIDE 10 MG/ML
INJECTION, SOLUTION EPIDURAL; INFILTRATION; INTRACAUDAL; PERINEURAL PRN
Status: DISCONTINUED | OUTPATIENT
Start: 2023-07-19 | End: 2023-07-19 | Stop reason: SDUPTHER

## 2023-07-19 RX ORDER — SODIUM CHLORIDE 9 MG/ML
INJECTION, SOLUTION INTRAVENOUS PRN
Status: DISCONTINUED | OUTPATIENT
Start: 2023-07-19 | End: 2023-07-24 | Stop reason: HOSPADM

## 2023-07-19 RX ORDER — LORAZEPAM 2 MG/ML
2 INJECTION INTRAMUSCULAR
Status: DISCONTINUED | OUTPATIENT
Start: 2023-07-19 | End: 2023-07-24 | Stop reason: HOSPADM

## 2023-07-19 RX ORDER — METOCLOPRAMIDE HYDROCHLORIDE 5 MG/ML
10 INJECTION INTRAMUSCULAR; INTRAVENOUS
Status: DISCONTINUED | OUTPATIENT
Start: 2023-07-19 | End: 2023-07-19 | Stop reason: HOSPADM

## 2023-07-19 RX ORDER — SODIUM CHLORIDE 0.9 % (FLUSH) 0.9 %
5-40 SYRINGE (ML) INJECTION PRN
Status: DISCONTINUED | OUTPATIENT
Start: 2023-07-19 | End: 2023-07-22

## 2023-07-19 RX ORDER — LORAZEPAM 2 MG/ML
4 INJECTION INTRAMUSCULAR
Status: DISCONTINUED | OUTPATIENT
Start: 2023-07-19 | End: 2023-07-24 | Stop reason: HOSPADM

## 2023-07-19 RX ORDER — SODIUM CHLORIDE 9 MG/ML
INJECTION, SOLUTION INTRAVENOUS PRN
Status: DISCONTINUED | OUTPATIENT
Start: 2023-07-19 | End: 2023-07-19 | Stop reason: HOSPADM

## 2023-07-19 RX ORDER — SODIUM CHLORIDE 0.9 % (FLUSH) 0.9 %
5-40 SYRINGE (ML) INJECTION EVERY 12 HOURS SCHEDULED
Status: DISCONTINUED | OUTPATIENT
Start: 2023-07-19 | End: 2023-07-24 | Stop reason: HOSPADM

## 2023-07-19 RX ORDER — TOPIRAMATE 100 MG/1
100 TABLET, FILM COATED ORAL NIGHTLY
Status: DISCONTINUED | OUTPATIENT
Start: 2023-07-19 | End: 2023-07-24 | Stop reason: HOSPADM

## 2023-07-19 RX ORDER — PRAVASTATIN SODIUM 40 MG
40 TABLET ORAL DAILY
Status: DISCONTINUED | OUTPATIENT
Start: 2023-07-19 | End: 2023-07-24 | Stop reason: HOSPADM

## 2023-07-19 RX ORDER — SODIUM CHLORIDE 0.9 % (FLUSH) 0.9 %
5-40 SYRINGE (ML) INJECTION PRN
Status: DISCONTINUED | OUTPATIENT
Start: 2023-07-19 | End: 2023-07-24 | Stop reason: HOSPADM

## 2023-07-19 RX ORDER — PROPOFOL 10 MG/ML
INJECTION, EMULSION INTRAVENOUS PRN
Status: DISCONTINUED | OUTPATIENT
Start: 2023-07-19 | End: 2023-07-19 | Stop reason: SDUPTHER

## 2023-07-19 RX ORDER — ONDANSETRON 2 MG/ML
INJECTION INTRAMUSCULAR; INTRAVENOUS PRN
Status: DISCONTINUED | OUTPATIENT
Start: 2023-07-19 | End: 2023-07-19 | Stop reason: SDUPTHER

## 2023-07-19 RX ORDER — LORAZEPAM 2 MG/ML
1 INJECTION INTRAMUSCULAR
Status: DISCONTINUED | OUTPATIENT
Start: 2023-07-19 | End: 2023-07-23 | Stop reason: SDUPTHER

## 2023-07-19 RX ORDER — LORAZEPAM 2 MG/ML
3 INJECTION INTRAMUSCULAR
Status: DISCONTINUED | OUTPATIENT
Start: 2023-07-19 | End: 2023-07-24 | Stop reason: HOSPADM

## 2023-07-19 RX ORDER — GAUZE BANDAGE 2" X 2"
100 BANDAGE TOPICAL DAILY
Status: DISCONTINUED | OUTPATIENT
Start: 2023-07-19 | End: 2023-07-24 | Stop reason: HOSPADM

## 2023-07-19 RX ORDER — LORAZEPAM 1 MG/1
1 TABLET ORAL
Status: DISCONTINUED | OUTPATIENT
Start: 2023-07-19 | End: 2023-07-23 | Stop reason: SDUPTHER

## 2023-07-19 RX ORDER — LORAZEPAM 1 MG/1
4 TABLET ORAL
Status: DISCONTINUED | OUTPATIENT
Start: 2023-07-19 | End: 2023-07-23 | Stop reason: SDUPTHER

## 2023-07-19 RX ORDER — SODIUM CHLORIDE 9 MG/ML
INJECTION, SOLUTION INTRAVENOUS CONTINUOUS
Status: DISCONTINUED | OUTPATIENT
Start: 2023-07-19 | End: 2023-07-19

## 2023-07-19 RX ORDER — LORAZEPAM 1 MG/1
2 TABLET ORAL
Status: DISCONTINUED | OUTPATIENT
Start: 2023-07-19 | End: 2023-07-24 | Stop reason: HOSPADM

## 2023-07-19 RX ORDER — OXYCODONE HYDROCHLORIDE AND ACETAMINOPHEN 5; 325 MG/1; MG/1
1 TABLET ORAL EVERY 4 HOURS PRN
Status: DISCONTINUED | OUTPATIENT
Start: 2023-07-19 | End: 2023-07-20

## 2023-07-19 RX ORDER — LORAZEPAM 1 MG/1
3 TABLET ORAL
Status: DISCONTINUED | OUTPATIENT
Start: 2023-07-19 | End: 2023-07-24 | Stop reason: HOSPADM

## 2023-07-19 RX ORDER — VANCOMYCIN HYDROCHLORIDE 1 G/20ML
INJECTION, POWDER, LYOPHILIZED, FOR SOLUTION INTRAVENOUS PRN
Status: DISCONTINUED | OUTPATIENT
Start: 2023-07-19 | End: 2023-07-19 | Stop reason: ALTCHOICE

## 2023-07-19 RX ORDER — THIAMINE HYDROCHLORIDE 100 MG/ML
100 INJECTION, SOLUTION INTRAMUSCULAR; INTRAVENOUS DAILY
Status: DISCONTINUED | OUTPATIENT
Start: 2023-07-19 | End: 2023-07-21 | Stop reason: SDUPTHER

## 2023-07-19 RX ORDER — FENTANYL CITRATE 0.05 MG/ML
50 INJECTION, SOLUTION INTRAMUSCULAR; INTRAVENOUS ONCE
Status: COMPLETED | OUTPATIENT
Start: 2023-07-19 | End: 2023-07-19

## 2023-07-19 RX ORDER — LORAZEPAM 2 MG/ML
2 INJECTION INTRAMUSCULAR
Status: DISCONTINUED | OUTPATIENT
Start: 2023-07-19 | End: 2023-07-23 | Stop reason: SDUPTHER

## 2023-07-19 RX ORDER — TOBRAMYCIN 1.2 G/30ML
INJECTION, POWDER, LYOPHILIZED, FOR SOLUTION INTRAVENOUS PRN
Status: DISCONTINUED | OUTPATIENT
Start: 2023-07-19 | End: 2023-07-19 | Stop reason: ALTCHOICE

## 2023-07-19 RX ORDER — METOCLOPRAMIDE HYDROCHLORIDE 5 MG/ML
INJECTION INTRAMUSCULAR; INTRAVENOUS PRN
Status: DISCONTINUED | OUTPATIENT
Start: 2023-07-19 | End: 2023-07-19 | Stop reason: SDUPTHER

## 2023-07-19 RX ORDER — LORAZEPAM 2 MG/ML
1 INJECTION INTRAMUSCULAR
Status: DISCONTINUED | OUTPATIENT
Start: 2023-07-19 | End: 2023-07-24 | Stop reason: HOSPADM

## 2023-07-19 RX ORDER — METOPROLOL TARTRATE 5 MG/5ML
INJECTION INTRAVENOUS PRN
Status: DISCONTINUED | OUTPATIENT
Start: 2023-07-19 | End: 2023-07-19 | Stop reason: SDUPTHER

## 2023-07-19 RX ORDER — LORAZEPAM 1 MG/1
2 TABLET ORAL
Status: DISCONTINUED | OUTPATIENT
Start: 2023-07-19 | End: 2023-07-23 | Stop reason: SDUPTHER

## 2023-07-19 RX ORDER — LORAZEPAM 1 MG/1
1 TABLET ORAL
Status: DISCONTINUED | OUTPATIENT
Start: 2023-07-19 | End: 2023-07-24 | Stop reason: HOSPADM

## 2023-07-19 RX ORDER — MIDAZOLAM HYDROCHLORIDE 1 MG/ML
INJECTION INTRAMUSCULAR; INTRAVENOUS PRN
Status: DISCONTINUED | OUTPATIENT
Start: 2023-07-19 | End: 2023-07-19 | Stop reason: SDUPTHER

## 2023-07-19 RX ORDER — FENTANYL CITRATE 0.05 MG/ML
25 INJECTION, SOLUTION INTRAMUSCULAR; INTRAVENOUS
Status: DISCONTINUED | OUTPATIENT
Start: 2023-07-19 | End: 2023-07-22

## 2023-07-19 RX ORDER — INSULIN LISPRO 100 [IU]/ML
0-4 INJECTION, SOLUTION INTRAVENOUS; SUBCUTANEOUS NIGHTLY
Status: DISCONTINUED | OUTPATIENT
Start: 2023-07-19 | End: 2023-07-23

## 2023-07-19 RX ORDER — BENZTROPINE MESYLATE 1 MG/1
1 TABLET ORAL 2 TIMES DAILY
Status: DISCONTINUED | OUTPATIENT
Start: 2023-07-19 | End: 2023-07-24 | Stop reason: HOSPADM

## 2023-07-19 RX ORDER — TRANEXAMIC ACID 100 MG/ML
INJECTION, SOLUTION INTRAVENOUS PRN
Status: DISCONTINUED | OUTPATIENT
Start: 2023-07-19 | End: 2023-07-19 | Stop reason: SDUPTHER

## 2023-07-19 RX ORDER — SODIUM CHLORIDE 9 MG/ML
INJECTION, SOLUTION INTRAVENOUS CONTINUOUS PRN
Status: DISCONTINUED | OUTPATIENT
Start: 2023-07-19 | End: 2023-07-19 | Stop reason: SDUPTHER

## 2023-07-19 RX ORDER — DIPHENHYDRAMINE HYDROCHLORIDE 50 MG/ML
12.5 INJECTION INTRAMUSCULAR; INTRAVENOUS
Status: DISCONTINUED | OUTPATIENT
Start: 2023-07-19 | End: 2023-07-19 | Stop reason: HOSPADM

## 2023-07-19 RX ORDER — LURASIDONE HYDROCHLORIDE 60 MG/1
60 TABLET, FILM COATED ORAL
Status: DISCONTINUED | OUTPATIENT
Start: 2023-07-19 | End: 2023-07-24 | Stop reason: HOSPADM

## 2023-07-19 RX ORDER — DEXTROSE MONOHYDRATE 100 MG/ML
INJECTION, SOLUTION INTRAVENOUS CONTINUOUS PRN
Status: DISCONTINUED | OUTPATIENT
Start: 2023-07-19 | End: 2023-07-24 | Stop reason: HOSPADM

## 2023-07-19 RX ORDER — LORAZEPAM 2 MG/ML
3 INJECTION INTRAMUSCULAR
Status: DISCONTINUED | OUTPATIENT
Start: 2023-07-19 | End: 2023-07-23 | Stop reason: SDUPTHER

## 2023-07-19 RX ORDER — INSULIN LISPRO 100 [IU]/ML
0-16 INJECTION, SOLUTION INTRAVENOUS; SUBCUTANEOUS
Status: DISCONTINUED | OUTPATIENT
Start: 2023-07-19 | End: 2023-07-24 | Stop reason: HOSPADM

## 2023-07-19 RX ORDER — LORAZEPAM 1 MG/1
3 TABLET ORAL
Status: DISCONTINUED | OUTPATIENT
Start: 2023-07-19 | End: 2023-07-23 | Stop reason: SDUPTHER

## 2023-07-19 RX ORDER — CEFAZOLIN SODIUM 1 G/3ML
INJECTION, POWDER, FOR SOLUTION INTRAMUSCULAR; INTRAVENOUS PRN
Status: DISCONTINUED | OUTPATIENT
Start: 2023-07-19 | End: 2023-07-19 | Stop reason: SDUPTHER

## 2023-07-19 RX ORDER — INSULIN GLARGINE 100 [IU]/ML
24 INJECTION, SOLUTION SUBCUTANEOUS DAILY
Status: DISCONTINUED | OUTPATIENT
Start: 2023-07-20 | End: 2023-07-24 | Stop reason: HOSPADM

## 2023-07-19 RX ORDER — LORAZEPAM 2 MG/ML
4 INJECTION INTRAMUSCULAR
Status: DISCONTINUED | OUTPATIENT
Start: 2023-07-19 | End: 2023-07-23 | Stop reason: SDUPTHER

## 2023-07-19 RX ADMIN — CEFAZOLIN 3 G: 1 INJECTION, POWDER, FOR SOLUTION INTRAMUSCULAR; INTRAVENOUS at 17:08

## 2023-07-19 RX ADMIN — FENTANYL CITRATE 50 MCG: 50 INJECTION, SOLUTION INTRAMUSCULAR; INTRAVENOUS at 18:38

## 2023-07-19 RX ADMIN — LORAZEPAM 2 MG: 2 INJECTION INTRAMUSCULAR; INTRAVENOUS at 00:20

## 2023-07-19 RX ADMIN — LORAZEPAM 2 MG: 2 INJECTION INTRAMUSCULAR; INTRAVENOUS at 14:05

## 2023-07-19 RX ADMIN — THIAMINE HYDROCHLORIDE 100 MG: 100 INJECTION, SOLUTION INTRAMUSCULAR; INTRAVENOUS at 07:54

## 2023-07-19 RX ADMIN — LORAZEPAM 2 MG: 2 INJECTION INTRAMUSCULAR; INTRAVENOUS at 12:08

## 2023-07-19 RX ADMIN — METOPROLOL TARTRATE 2.5 MG: 1 INJECTION, SOLUTION INTRAVENOUS at 04:35

## 2023-07-19 RX ADMIN — FENTANYL CITRATE 25 MCG: 0.05 INJECTION, SOLUTION INTRAMUSCULAR; INTRAVENOUS at 07:59

## 2023-07-19 RX ADMIN — FENTANYL CITRATE 25 MCG: 0.05 INJECTION, SOLUTION INTRAMUSCULAR; INTRAVENOUS at 11:02

## 2023-07-19 RX ADMIN — FENTANYL CITRATE 25 MCG: 0.05 INJECTION, SOLUTION INTRAMUSCULAR; INTRAVENOUS at 04:42

## 2023-07-19 RX ADMIN — FENTANYL CITRATE 25 MCG: 0.05 INJECTION, SOLUTION INTRAMUSCULAR; INTRAVENOUS at 13:18

## 2023-07-19 RX ADMIN — LORAZEPAM 2 MG: 2 INJECTION INTRAMUSCULAR; INTRAVENOUS at 02:25

## 2023-07-19 RX ADMIN — ROCURONIUM BROMIDE 10 MG: 10 INJECTION, SOLUTION INTRAVENOUS at 18:37

## 2023-07-19 RX ADMIN — SUGAMMADEX 200 MG: 100 INJECTION, SOLUTION INTRAVENOUS at 19:43

## 2023-07-19 RX ADMIN — LORAZEPAM 2 MG: 2 INJECTION INTRAMUSCULAR; INTRAVENOUS at 05:45

## 2023-07-19 RX ADMIN — SODIUM CHLORIDE, PRESERVATIVE FREE 40 MG: 5 INJECTION INTRAVENOUS at 07:59

## 2023-07-19 RX ADMIN — TRANEXAMIC ACID 1000 MG: 100 INJECTION, SOLUTION INTRAVENOUS at 17:44

## 2023-07-19 RX ADMIN — METOPROLOL TARTRATE 2.5 MG: 1 INJECTION, SOLUTION INTRAVENOUS at 17:37

## 2023-07-19 RX ADMIN — THIAMINE HCL TAB 100 MG 100 MG: 100 TAB at 11:02

## 2023-07-19 RX ADMIN — METOPROLOL TARTRATE 2.5 MG: 1 INJECTION, SOLUTION INTRAVENOUS at 09:42

## 2023-07-19 RX ADMIN — METOCLOPRAMIDE 10 MG: 5 INJECTION, SOLUTION INTRAMUSCULAR; INTRAVENOUS at 19:09

## 2023-07-19 RX ADMIN — LORAZEPAM 2 MG: 2 INJECTION INTRAMUSCULAR; INTRAVENOUS at 09:42

## 2023-07-19 RX ADMIN — LIDOCAINE HYDROCHLORIDE 40 MG: 10 INJECTION, SOLUTION EPIDURAL; INFILTRATION; INTRACAUDAL; PERINEURAL at 16:55

## 2023-07-19 RX ADMIN — FENTANYL CITRATE 50 MCG: 50 INJECTION, SOLUTION INTRAMUSCULAR; INTRAVENOUS at 18:01

## 2023-07-19 RX ADMIN — PROPOFOL 200 MG: 10 INJECTION, EMULSION INTRAVENOUS at 16:55

## 2023-07-19 RX ADMIN — SODIUM CHLORIDE: 9 INJECTION, SOLUTION INTRAVENOUS at 16:22

## 2023-07-19 RX ADMIN — MIDAZOLAM 2 MG: 1 INJECTION INTRAMUSCULAR; INTRAVENOUS at 16:52

## 2023-07-19 RX ADMIN — ROCURONIUM BROMIDE 10 MG: 10 INJECTION, SOLUTION INTRAVENOUS at 18:18

## 2023-07-19 RX ADMIN — ONDANSETRON 4 MG: 2 INJECTION INTRAMUSCULAR; INTRAVENOUS at 19:26

## 2023-07-19 RX ADMIN — PRAVASTATIN SODIUM 40 MG: 40 TABLET ORAL at 08:00

## 2023-07-19 RX ADMIN — INSULIN LISPRO 4 UNITS: 100 INJECTION, SOLUTION INTRAVENOUS; SUBCUTANEOUS at 07:54

## 2023-07-19 RX ADMIN — ROCURONIUM BROMIDE 10 MG: 10 INJECTION, SOLUTION INTRAVENOUS at 17:57

## 2023-07-19 RX ADMIN — METOPROLOL TARTRATE 2.5 MG: 1 INJECTION, SOLUTION INTRAVENOUS at 17:41

## 2023-07-19 RX ADMIN — METOPROLOL TARTRATE 2.5 MG: 1 INJECTION, SOLUTION INTRAVENOUS at 15:23

## 2023-07-19 RX ADMIN — SODIUM CHLORIDE: 9 INJECTION, SOLUTION INTRAVENOUS at 19:04

## 2023-07-19 RX ADMIN — FENTANYL CITRATE 50 MCG: 0.05 INJECTION, SOLUTION INTRAMUSCULAR; INTRAVENOUS at 00:49

## 2023-07-19 RX ADMIN — SODIUM CHLORIDE, PRESERVATIVE FREE 10 ML: 5 INJECTION INTRAVENOUS at 09:42

## 2023-07-19 RX ADMIN — FENTANYL CITRATE 25 MCG: 0.05 INJECTION, SOLUTION INTRAMUSCULAR; INTRAVENOUS at 15:13

## 2023-07-19 RX ADMIN — FENTANYL CITRATE 100 MCG: 50 INJECTION, SOLUTION INTRAMUSCULAR; INTRAVENOUS at 16:55

## 2023-07-19 RX ADMIN — BENZTROPINE MESYLATE 1 MG: 1 TABLET ORAL at 08:00

## 2023-07-19 RX ADMIN — FOLIC ACID 1 MG: 1 TABLET ORAL at 08:00

## 2023-07-19 RX ADMIN — ROCURONIUM BROMIDE 20 MG: 10 INJECTION, SOLUTION INTRAVENOUS at 18:56

## 2023-07-19 RX ADMIN — ROCURONIUM BROMIDE 50 MG: 10 INJECTION, SOLUTION INTRAVENOUS at 16:55

## 2023-07-19 RX ADMIN — ROCURONIUM BROMIDE 20 MG: 10 INJECTION, SOLUTION INTRAVENOUS at 17:34

## 2023-07-19 RX ADMIN — FENTANYL CITRATE 25 MCG: 0.05 INJECTION, SOLUTION INTRAMUSCULAR; INTRAVENOUS at 06:36

## 2023-07-19 RX ADMIN — LORAZEPAM 4 MG: 2 INJECTION INTRAMUSCULAR; INTRAVENOUS at 07:59

## 2023-07-19 RX ADMIN — HYDROMORPHONE HYDROCHLORIDE 0.5 MG: 1 INJECTION, SOLUTION INTRAMUSCULAR; INTRAVENOUS; SUBCUTANEOUS at 20:20

## 2023-07-19 ASSESSMENT — LIFESTYLE VARIABLES
HOW OFTEN DO YOU HAVE A DRINK CONTAINING ALCOHOL: MONTHLY OR LESS
HOW MANY STANDARD DRINKS CONTAINING ALCOHOL DO YOU HAVE ON A TYPICAL DAY: 3 OR 4

## 2023-07-19 ASSESSMENT — PAIN DESCRIPTION - DESCRIPTORS
DESCRIPTORS: ACHING

## 2023-07-19 ASSESSMENT — PAIN DESCRIPTION - ORIENTATION
ORIENTATION: LEFT

## 2023-07-19 ASSESSMENT — ENCOUNTER SYMPTOMS
EYE REDNESS: 0
DIARRHEA: 0
SORE THROAT: 0
VOMITING: 0
ABDOMINAL PAIN: 0
EYE DISCHARGE: 0
NAUSEA: 0
SHORTNESS OF BREATH: 0
RHINORRHEA: 0

## 2023-07-19 ASSESSMENT — PAIN SCALES - GENERAL
PAINLEVEL_OUTOF10: 8
PAINLEVEL_OUTOF10: 10
PAINLEVEL_OUTOF10: 8
PAINLEVEL_OUTOF10: 9
PAINLEVEL_OUTOF10: 10
PAINLEVEL_OUTOF10: 8
PAINLEVEL_OUTOF10: 7
PAINLEVEL_OUTOF10: 9

## 2023-07-19 ASSESSMENT — PAIN DESCRIPTION - PAIN TYPE: TYPE: ACUTE PAIN

## 2023-07-19 ASSESSMENT — PAIN DESCRIPTION - LOCATION
LOCATION: HIP

## 2023-07-19 NOTE — ED NOTES
TRANSFER - OUT REPORT:    Verbal report given to [address] [place] [address] Dr naima Ochoa  being transferred to 2016 for routine progression of patient care       Report consisted of patient's Situation, Background, Assessment and   Recommendations(SBAR). Information from the following report(s) ED Encounter Summary was reviewed with the receiving nurse. [place] Fall Assessment:    Presents to emergency department  because of falls (Syncope, seizure, or loss of consciousness): Yes  Age > 79: No  Altered Mental Status, Intoxication with alcohol or substance confusion (Disorientation, impaired judgment, poor safety awaremess, or inability to follow instructions): Yes  Impaired Mobility: Ambulates or transfers with assistive devices or assistance; Unable to ambulate or transer.: Yes  Nursing Judgement: Yes          Lines:   Peripheral IV 07/18/23 Left; Anterior Foot (Active)   Site Assessment Clean, dry & intact 07/18/23 2138       Peripheral IV 07/18/23 Right; Anterior Forearm (Active)   Site Assessment Clean, dry & intact 07/18/23 2253   Line Status Blood return noted 07/18/23 2253   Phlebitis Assessment No symptoms 07/18/23 2253   Infiltration Assessment 0 07/18/23 2253   Dressing Status Clean, dry & intact 07/18/23 2253   Dressing Type Transparent 07/18/23 2253   Dressing Intervention New 07/18/23 2253        Opportunity for questions and clarification was provided.       Patient transported with:  Registered Nurse          Adelaida Dougherty RN  46/30/73 2649

## 2023-07-19 NOTE — ED NOTES
875cc of urine output. Collected urine specimen and sent it to the lab.      Steven Rdz  07/18/23 Lauren  07/18/23 3561

## 2023-07-19 NOTE — PLAN OF CARE
Patient:  Shruti Viera  YOB: 1980     39 y.o. female    Orthopedic post-operative instructions    Shruti Viera is a 39 y.o. female who is s/p L hip cecilio, POD#0:    - Weightbearing status: WBAT LLE  - Posterior hip precautions, please maintain  - Maintain hip abduction pillow while in bed  - Complete post-op antibiotics  - Please start anticoagulation POD1 (tomorrow, 7/20)  - Maintain dressing to the left hip  - Diet: OK for diet  - Strict ice and elevation for pain/swelling  - DVT ppx: Ok to start chemical AC on POD#1  - Pain control PO/IV Medication. Attempt to Wean IV medications.    - Encourage Incentive Spirometry use  - Work with PT/OT for mobilization  - Follow up with Dr. Nancy Pollard on 8/2/23 at 2pm  - Please page ortho with any questions    Kely Deng DO,   PGY-3, Department of [address], [place]  7:55 PM 7/19/2023

## 2023-07-19 NOTE — ED TRIAGE NOTES
Mode of arrival (squad #, walk in, police, etc) : Primitivo Plasencia complaint(s): Altered mental status  Fall, ETOH intoxication        Arrival Note (brief scenario, treatment PTA, etc). : Pt brought in by [facility]. They were called by pt's . Pt has been drinking large amounts of fireball per  and pt fell pinning herself against the wall. Pt's  told EMS that he would not be coming to the ER because he is tired of the pt doing this. EMS have concerns that pt isn't able to care for herself at home and stated that pt was covered in cat feces. Pt alert upon arrival but words were slurred. Pt incontinent. She had a recent visit for the same and EMS stated she had a brain bleed less than 2 weeks ago  Pt denies HI or SI  1 Liter NS infused in route. Pt hyperglycemic due to fireball consumption per ems and their meter red high. C= \"Have you ever felt that you should Cut down on your drinking? \"  no  A= \"Have people Annoyed you by criticizing your drinking? \"  no  G= \"Have you ever felt bad or Guilty about your drinking? \"  no  E= \"Have you ever had a drink as an Eye-opener first thing in the morning to steady your nerves or to help a hangover? \"  no      Deferred []      Reason for deferring:     *If yes to two or more: probable alcohol abuse. *

## 2023-07-19 NOTE — CONSULTS
[address]    HISTORY AND PHYSICAL EXAMINATION            Date:   7/19/2023  Patient name:  Shraddha Montero  Date of admission:  7/18/2023  9:20 PM  MRN:   [id number]  Account:  [de-identified]  YOB: 1980  PCP:    Snehal Burciaga  Room:   2016/2016-01  Code Status:    Full Code    Chief Complaint:     Chief Complaint   Patient presents with    Altered Mental Status    Alcohol Intoxication       History Obtained From:     patient    History of Present Illness: The patient is a 39 y.o. Non- / non  female who presents with Altered Mental Status and Alcohol Intoxication   and she is admitted to the hospital for the management of      Patient is 43-year-old female with multiple comorbidities including history of gastric bypass, 5 hypertension, history of antiphospholipid syndrome on fondaparinux, alcohol abuse uncontrolled diabetes, recurrent falls, recent subarachnoid hemorrhage positive for leg pain history of depression and anxiety presented to the ER with fall.     In the ER found to have alcohol level of 276, reportedly drinks 6 beers yesterday,  CK was high, blood sugar in 500, beta-hydroxybutyrate was negative  Patient found to have left femoral neck fracture.,  Medicine has been consulted for medical management and clearance  Patient was found to be tachycardic with heart rate in 130s, sinus tachycardia  Heart rate is now improved to low 100s, patient currently asymptomatic denies any chest pain shortness of breath, complaining of hip and leg pain, states that she is very uncomfortable,  Past Medical History:     Past Medical History:   Diagnosis Date    Alcohol abuse     Recurrent episodes of withdrawal    Anxiety 2013    Arthritis 2013    Painting esophagus 07/19/2021    Benzodiazepine overdose 09/26/2015    Bipolar disorder, unspecified ([address])     Bipolar I disorder, most recent episode depressed, severe without psychotic

## 2023-07-19 NOTE — CARE COORDINATION
Case Management Assessment  Initial Evaluation    Date/Time of Evaluation: 7/19/2023 12:20 PM  Assessment Completed by: Roxanne Palma RN    If patient is discharged prior to next notation, then this note serves as note for discharge by case management. Patient Name: Leo Stratton                   YOB: 1980  Diagnosis: Alcohol abuse [F10.10]  Hyperglycemia [R73.9]  Closed fracture of left hip, initial encounter ([address]) [S72.002A]  Closed left hip fracture, initial encounter ([address]) Jasmine Randall                   Date / Time: 7/18/2023  9:20 PM    Patient Admission Status: Inpatient   Readmission Risk (Low < 19, Mod (19-27), High > 27): Readmission Risk Score: 38.8    Current PCP: Maryann Wallace  PCP verified by CM? Yes    Chart Reviewed: Yes      History Provided by: Patient  Patient Orientation: Alert and Oriented    Patient Cognition: Alert    Hospitalization in the last 30 days (Readmission):  Yes    If yes, Readmission Assessment in CM Navigator will be completed. Advance Directives:      Code Status: Full Code   Patient's Primary Decision Maker is: Legal Next of Kin    Primary Decision Maker: Bharat Herrera - Parent - [phone]    Secondary Decision Maker: Jerel Ang - Brother/Sister - [phone]    Discharge Planning:    Patient lives with: Spouse/Significant Other, Children Type of Home: Trailer/Mobile Home  Primary Care Giver: Self  Patient Support Systems include: Spouse/Significant Other   Current Financial resources: Medicaid, Medicare  Current community resources: ECF/Home Care  Current services prior to admission: Durable Medical Equipment            Current DME: Glucometer, Elnor Schwab            Type of Home Care services:  Nursing Services, Skilled Therapy    ADLS  Prior functional level: Independent in ADLs/IADLs  Current functional level: Independent in ADLs/IADLs    PT AM-PAC:   /24  OT AM-PAC:   /24    Family can provide assistance at DC:  Yes  Would you like Case

## 2023-07-19 NOTE — H&P
Orthopedic H&P      CHIEF COMPLAINT: Left hip pain    HISTORY OF PRESENT ILLNESS:      The patient is a 39 y.o. female who presented to [facility] ED after sustaining a fall. Patient states that she got up from her bed, tripped landing on her left side. Patient denies hitting her head or any loss of consciousness. Patient has significant past medical history which includes hypertension, antiphospholipid syndrome currently on fondaparinux, anxiety and depression, significant alcohol abuse, uncontrolled diabetes with peripheral neuropathy. Patient states that she will have periods of time where she binge drinks and then goes weeks without drinking. Patient is positive for dysesthesias to bilateral lower extremities from neuropathy. Patient was recently seen in the ED after sustaining a fall from being intoxicated, and had a subarachnoid hemorrhage. While in the ED, patient was found to have a left femoral neck fracture. Patient also has a history of gastric surgery. Patient also has extensive psych history. Last hemoglobin A1c was 7.7 taken on 7/6/2023. Patient routinely has blood sugars over 500. Patient normally ambulates without any assistive devices. No other orthopedic complaints.     Past Medical History:    Past Medical History:   Diagnosis Date    Alcohol abuse     Recurrent episodes of withdrawal    Anxiety 2013    Arthritis 2013    Paitning esophagus 07/19/2021    Benzodiazepine overdose 09/26/2015    Bipolar disorder, unspecified ([address])     Bipolar I disorder, most recent episode depressed, severe without psychotic features ([address])     Cellulitis 11/17/2018    Chronic abdominal wound infection 09/27/2015    Constipation 09/03/2019    Depression 07/12/2015    Drug overdose, intentional ([address]) 07/12/2015    Genital herpes, unspecified     GERD (gastroesophageal reflux disease) 2021    History of pulmonary embolism     Hx of blood clots dx 2 years ago    clots in both legs and lungs     Hypertension

## 2023-07-19 NOTE — OP NOTE
Operative Note    Madison Ramirez  YOB: 1980  [id number]      Pre-operative Diagnosis: Left Femoral Neck Fracture    Post-operative Diagnosis: Left Femoral Neck Fracture    Procedure:  Left Hip Hemiarthroplasty     Anesthesia: General    Surgeons: Jose Francisco Traylor DO    Assistants: Alber Gallegos    Estimated Blood Loss: 566DS    Complications: None    Specimens: Was Not Obtained    Findings: Displaced Left Femoral Neck Fracture    Implants: Depuy Corail Size 49 bipolar-Head, Size 14 Stem, high offset     Indications: This is a 40 yo Female who presents for operative intervention of their Left femoral neck fracture. All treatment options, including conservative and operative, were discussed with the patient in detail including the risks and benefits of each. Risks including but not limited to  bleeding, blood clots, infection, damage to nearby tissues, vessels and nerves, wound healing complications, failed procedure, stiffness, loss of motion, intraoperative fracture, anesthesia risk, leg length inequality, loss of life were all discussed with the patient. Knowing these risks, the patient wished to proceed with surgery as indicated. Consent was obtained. Site Marked. All questions answered to the patient's satisfaction. Operative Summary:  The patient was wheeled to the operative suite and laid on the table in supine fashion. Endotracheal intubation was performed under general anesthesia. The patient was place in the lateral decubitus position, axillary roll was placed. All bony prominences were appropriately protected. The operative leg was prepped and draped in sterile fashion. Preoperative antibiotics and single dose 1 gram TXA was administered. At this time all team members paused to identify proper patient name, indications, site, allergies. All team members were in agreement.     Using a #10 blade, an incision through skin and subcutaneous tissue was performed beginning 6 cm distal to the

## 2023-07-19 NOTE — ACP (ADVANCE CARE PLANNING)
and portable DNR orders.     Length of ACP Conversation in minutes:      Conversation Outcomes:  ACP discussion completed    Follow-up plan:    [] Schedule follow-up conversation to continue planning  [x] Referred individual to Provider for additional questions/concerns   [] Advised patient/agent/surrogate to review completed ACP document and update if needed with changes in condition, patient preferences or care setting    [] This note routed to one or more involved healthcare providers

## 2023-07-19 NOTE — BRIEF OP NOTE
Brief Postoperative Note      Patient: Traci Sharma  YOB: 1980  MRN: [id number]    Date of Procedure: 7/19/2023    Pre-Op Diagnosis Codes:  1) Left hip femoral neck fracture    Post-Op Diagnosis:   1) Left hip femoral neck fracture       Procedure(s):  1) Left hip hemiarthroplasty    Surgeon(s):  Gurvinder Hicks DO    Assistant:  Resident: Gabrielle Parada DO    Anesthesia: General    Estimated Blood Loss (mL): 811VD    Complications: None    Specimens:   * No specimens in log *    Implants:  Implant Name Type Inv.  Item Serial No.  Lot No. LRB No. Used Action   CORAIL AMT [id number] 135 HIGH COL - [id number]  CORAIL AMT SZ14 135 HIGH COL  [facility] [employer]- [id number] Left 1 Implanted   HEAD BPLR OD49MM ID28MM FEM HIP SELF CNTR - [id number]  HEAD BPLR OD49MM ID28MM FEM HIP SELF CNTR  [facility] [employer]- [id number] Left 1 Implanted   IMPL HIP FEM HEAD TAPR 12/14 28MM +5 - [id number] Hip IMPL HIP FEM HEAD TAPR 12/14 28MM +5  JN: [employer]-PMM [id number] Left 1 Implanted         Drains:   Closed/Suction Drain Left Hip Accordion (Active)       [REMOVED] External Urinary Catheter (Removed)   Site Assessment Clean,dry & intact 06/26/23 2151   Placement Initiated 06/26/23 2151   Perineal Care Yes 06/26/23 2151   Suction 40 mmgHg continuous 06/26/23 2151       Findings: See op note      Electronically signed by Gurvinder Hicks DO on 7/19/2023 at 8:51 PM

## 2023-07-20 LAB
ANION GAP SERPL CALCULATED.3IONS-SCNC: 9 MMOL/L (ref 9–17)
BASOPHILS # BLD: 0 K/UL (ref 0–0.2)
BASOPHILS NFR BLD: 0 % (ref 0–2)
BUN SERPL-MCNC: 8 MG/DL (ref 6–20)
CALCIUM SERPL-MCNC: 7.4 MG/DL (ref 8.6–10.4)
CHLORIDE SERPL-SCNC: 101 MMOL/L (ref 98–107)
CO2 SERPL-SCNC: 22 MMOL/L (ref 20–31)
CREAT SERPL-MCNC: 0.6 MG/DL (ref 0.5–0.9)
EOSINOPHIL # BLD: 0 K/UL (ref 0–0.4)
EOSINOPHILS RELATIVE PERCENT: 0 % (ref 0–4)
ERYTHROCYTE [DISTWIDTH] IN BLOOD BY AUTOMATED COUNT: 15.4 % (ref 11.5–14.9)
GFR SERPL CREATININE-BSD FRML MDRD: >60 ML/MIN/1.73M2
GLUCOSE SERPL-MCNC: 268 MG/DL (ref 70–99)
HCT VFR BLD AUTO: 28.5 % (ref 36–46)
HGB BLD-MCNC: 9.2 G/DL (ref 12–16)
LYMPHOCYTES NFR BLD: 1.1 K/UL (ref 1–4.8)
LYMPHOCYTES RELATIVE PERCENT: 12 % (ref 24–44)
MCH RBC QN AUTO: 31.6 PG (ref 26–34)
MCHC RBC AUTO-ENTMCNC: 32.5 G/DL (ref 31–37)
MCV RBC AUTO: 97.3 FL (ref 80–100)
METER GLUCOSE: 121 MG/DL (ref 65–105)
METER GLUCOSE: 176 MG/DL (ref 65–105)
METER GLUCOSE: 211 MG/DL (ref 65–105)
METER GLUCOSE: 293 MG/DL (ref 65–105)
MONOCYTES NFR BLD: 1 K/UL (ref 0.1–1.3)
MONOCYTES NFR BLD: 11 % (ref 1–7)
NEUTROPHILS NFR BLD: 77 % (ref 36–66)
NEUTS SEG NFR BLD: 7.3 K/UL (ref 1.3–9.1)
PLATELET # BLD AUTO: 142 K/UL (ref 150–450)
PMV BLD AUTO: 10.5 FL (ref 6–12)
POTASSIUM SERPL-SCNC: 4.4 MMOL/L (ref 3.7–5.3)
RBC # BLD AUTO: 2.93 M/UL (ref 4–5.2)
SODIUM SERPL-SCNC: 132 MMOL/L (ref 135–144)
WBC OTHER # BLD: 9.5 K/UL (ref 3.5–11)

## 2023-07-20 PROCEDURE — C9113 INJ PANTOPRAZOLE SODIUM, VIA: HCPCS

## 2023-07-20 PROCEDURE — 2500000003 HC RX 250 WO HCPCS

## 2023-07-20 PROCEDURE — 99233 SBSQ HOSP IP/OBS HIGH 50: CPT | Performed by: INTERNAL MEDICINE

## 2023-07-20 PROCEDURE — 82947 ASSAY GLUCOSE BLOOD QUANT: CPT

## 2023-07-20 PROCEDURE — 80048 BASIC METABOLIC PNL TOTAL CA: CPT

## 2023-07-20 PROCEDURE — 2580000003 HC RX 258

## 2023-07-20 PROCEDURE — 6370000000 HC RX 637 (ALT 250 FOR IP): Performed by: INTERNAL MEDICINE

## 2023-07-20 PROCEDURE — 97162 PT EVAL MOD COMPLEX 30 MIN: CPT

## 2023-07-20 PROCEDURE — 6360000002 HC RX W HCPCS

## 2023-07-20 PROCEDURE — A4216 STERILE WATER/SALINE, 10 ML: HCPCS

## 2023-07-20 PROCEDURE — 6370000000 HC RX 637 (ALT 250 FOR IP)

## 2023-07-20 PROCEDURE — 6360000002 HC RX W HCPCS: Performed by: INTERNAL MEDICINE

## 2023-07-20 PROCEDURE — 85027 COMPLETE CBC AUTOMATED: CPT

## 2023-07-20 PROCEDURE — 97530 THERAPEUTIC ACTIVITIES: CPT

## 2023-07-20 PROCEDURE — 6360000002 HC RX W HCPCS: Performed by: STUDENT IN AN ORGANIZED HEALTH CARE EDUCATION/TRAINING PROGRAM

## 2023-07-20 PROCEDURE — 2060000000 HC ICU INTERMEDIATE R&B

## 2023-07-20 PROCEDURE — 36415 COLL VENOUS BLD VENIPUNCTURE: CPT

## 2023-07-20 PROCEDURE — 97166 OT EVAL MOD COMPLEX 45 MIN: CPT

## 2023-07-20 PROCEDURE — 97110 THERAPEUTIC EXERCISES: CPT

## 2023-07-20 PROCEDURE — 6370000000 HC RX 637 (ALT 250 FOR IP): Performed by: STUDENT IN AN ORGANIZED HEALTH CARE EDUCATION/TRAINING PROGRAM

## 2023-07-20 RX ORDER — OXYCODONE HYDROCHLORIDE 10 MG/1
10 TABLET ORAL EVERY 4 HOURS PRN
Status: DISCONTINUED | OUTPATIENT
Start: 2023-07-20 | End: 2023-07-22

## 2023-07-20 RX ORDER — OXYCODONE HYDROCHLORIDE 5 MG/1
5 TABLET ORAL EVERY 4 HOURS PRN
Status: DISCONTINUED | OUTPATIENT
Start: 2023-07-20 | End: 2023-07-24 | Stop reason: HOSPADM

## 2023-07-20 RX ORDER — MIDODRINE HYDROCHLORIDE 2.5 MG/1
2.5 TABLET ORAL 2 TIMES DAILY PRN
Status: DISCONTINUED | OUTPATIENT
Start: 2023-07-20 | End: 2023-07-24 | Stop reason: HOSPADM

## 2023-07-20 RX ORDER — CYCLOBENZAPRINE HCL 10 MG
10 TABLET ORAL 3 TIMES DAILY PRN
Status: DISCONTINUED | OUTPATIENT
Start: 2023-07-20 | End: 2023-07-24 | Stop reason: HOSPADM

## 2023-07-20 RX ORDER — KETOROLAC TROMETHAMINE 30 MG/ML
30 INJECTION, SOLUTION INTRAMUSCULAR; INTRAVENOUS EVERY 6 HOURS
Status: COMPLETED | OUTPATIENT
Start: 2023-07-20 | End: 2023-07-21

## 2023-07-20 RX ORDER — METOPROLOL TARTRATE 50 MG/1
50 TABLET, FILM COATED ORAL 2 TIMES DAILY
Status: DISCONTINUED | OUTPATIENT
Start: 2023-07-20 | End: 2023-07-24 | Stop reason: HOSPADM

## 2023-07-20 RX ORDER — TRAZODONE HYDROCHLORIDE 50 MG/1
50 TABLET ORAL NIGHTLY PRN
Status: DISCONTINUED | OUTPATIENT
Start: 2023-07-20 | End: 2023-07-24 | Stop reason: HOSPADM

## 2023-07-20 RX ORDER — ACETAMINOPHEN 500 MG
1000 TABLET ORAL EVERY 8 HOURS SCHEDULED
Status: DISCONTINUED | OUTPATIENT
Start: 2023-07-20 | End: 2023-07-24 | Stop reason: HOSPADM

## 2023-07-20 RX ORDER — FONDAPARINUX SODIUM 10 MG/.8ML
10 INJECTION SUBCUTANEOUS DAILY
Status: DISCONTINUED | OUTPATIENT
Start: 2023-07-20 | End: 2023-07-24 | Stop reason: HOSPADM

## 2023-07-20 RX ADMIN — ACETAMINOPHEN 1000 MG: 500 TABLET ORAL at 22:51

## 2023-07-20 RX ADMIN — FENTANYL CITRATE 25 MCG: 0.05 INJECTION, SOLUTION INTRAMUSCULAR; INTRAVENOUS at 14:09

## 2023-07-20 RX ADMIN — KETOROLAC TROMETHAMINE 30 MG: 30 INJECTION, SOLUTION INTRAMUSCULAR; INTRAVENOUS at 15:41

## 2023-07-20 RX ADMIN — LURASIDONE HYDROCHLORIDE 60 MG: 60 TABLET, FILM COATED ORAL at 16:55

## 2023-07-20 RX ADMIN — FONDAPARINUX SODIUM 10 MG: 10 INJECTION, SOLUTION SUBCUTANEOUS at 09:15

## 2023-07-20 RX ADMIN — SODIUM CHLORIDE, PRESERVATIVE FREE 40 MG: 5 INJECTION INTRAVENOUS at 07:57

## 2023-07-20 RX ADMIN — METOPROLOL TARTRATE 2.5 MG: 1 INJECTION, SOLUTION INTRAVENOUS at 07:57

## 2023-07-20 RX ADMIN — INSULIN LISPRO 4 UNITS: 100 INJECTION, SOLUTION INTRAVENOUS; SUBCUTANEOUS at 16:55

## 2023-07-20 RX ADMIN — OXYCODONE HYDROCHLORIDE AND ACETAMINOPHEN 1 TABLET: 5; 325 TABLET ORAL at 00:25

## 2023-07-20 RX ADMIN — THIAMINE HCL TAB 100 MG 100 MG: 100 TAB at 07:57

## 2023-07-20 RX ADMIN — KETOROLAC TROMETHAMINE 30 MG: 30 INJECTION, SOLUTION INTRAMUSCULAR; INTRAVENOUS at 21:11

## 2023-07-20 RX ADMIN — PRAVASTATIN SODIUM 40 MG: 40 TABLET ORAL at 07:58

## 2023-07-20 RX ADMIN — BENZTROPINE MESYLATE 1 MG: 1 TABLET ORAL at 21:13

## 2023-07-20 RX ADMIN — EMPAGLIFLOZIN 10 MG: 10 TABLET, FILM COATED ORAL at 12:23

## 2023-07-20 RX ADMIN — METOPROLOL TARTRATE 50 MG: 50 TABLET ORAL at 12:15

## 2023-07-20 RX ADMIN — FENTANYL CITRATE 25 MCG: 0.05 INJECTION, SOLUTION INTRAMUSCULAR; INTRAVENOUS at 04:03

## 2023-07-20 RX ADMIN — SODIUM CHLORIDE, PRESERVATIVE FREE 10 ML: 5 INJECTION INTRAVENOUS at 14:09

## 2023-07-20 RX ADMIN — OXYCODONE HYDROCHLORIDE 5 MG: 5 TABLET ORAL at 16:55

## 2023-07-20 RX ADMIN — SODIUM CHLORIDE, PRESERVATIVE FREE 10 ML: 5 INJECTION INTRAVENOUS at 21:12

## 2023-07-20 RX ADMIN — BENZTROPINE MESYLATE 1 MG: 1 TABLET ORAL at 12:10

## 2023-07-20 RX ADMIN — ACETAMINOPHEN 1000 MG: 500 TABLET ORAL at 15:41

## 2023-07-20 RX ADMIN — OXYCODONE HYDROCHLORIDE AND ACETAMINOPHEN 1 TABLET: 5; 325 TABLET ORAL at 05:47

## 2023-07-20 RX ADMIN — SODIUM CHLORIDE, PRESERVATIVE FREE 10 ML: 5 INJECTION INTRAVENOUS at 09:21

## 2023-07-20 RX ADMIN — METOPROLOL TARTRATE 2.5 MG: 1 INJECTION, SOLUTION INTRAVENOUS at 01:23

## 2023-07-20 RX ADMIN — INSULIN LISPRO 8 UNITS: 100 INJECTION, SOLUTION INTRAVENOUS; SUBCUTANEOUS at 09:31

## 2023-07-20 RX ADMIN — FOLIC ACID 1 MG: 1 TABLET ORAL at 07:57

## 2023-07-20 RX ADMIN — METOPROLOL TARTRATE 50 MG: 50 TABLET ORAL at 21:11

## 2023-07-20 RX ADMIN — THIAMINE HYDROCHLORIDE 100 MG: 100 INJECTION, SOLUTION INTRAMUSCULAR; INTRAVENOUS at 12:10

## 2023-07-20 RX ADMIN — INSULIN GLARGINE 24 UNITS: 100 INJECTION, SOLUTION SUBCUTANEOUS at 08:21

## 2023-07-20 RX ADMIN — SODIUM CHLORIDE, PRESERVATIVE FREE 10 ML: 5 INJECTION INTRAVENOUS at 21:11

## 2023-07-20 RX ADMIN — TOPIRAMATE 100 MG: 100 TABLET, FILM COATED ORAL at 21:11

## 2023-07-20 RX ADMIN — FENTANYL CITRATE 25 MCG: 0.05 INJECTION, SOLUTION INTRAMUSCULAR; INTRAVENOUS at 12:23

## 2023-07-20 RX ADMIN — FENTANYL CITRATE 25 MCG: 0.05 INJECTION, SOLUTION INTRAMUSCULAR; INTRAVENOUS at 09:21

## 2023-07-20 ASSESSMENT — PAIN SCALES - GENERAL
PAINLEVEL_OUTOF10: 10
PAINLEVEL_OUTOF10: 7
PAINLEVEL_OUTOF10: 8
PAINLEVEL_OUTOF10: 10
PAINLEVEL_OUTOF10: 7
PAINLEVEL_OUTOF10: 10
PAINLEVEL_OUTOF10: 10
PAINLEVEL_OUTOF10: 7
PAINLEVEL_OUTOF10: 8
PAINLEVEL_OUTOF10: 8

## 2023-07-20 ASSESSMENT — PAIN DESCRIPTION - LOCATION
LOCATION: HIP

## 2023-07-20 ASSESSMENT — PAIN DESCRIPTION - ORIENTATION
ORIENTATION: LEFT

## 2023-07-20 ASSESSMENT — PAIN DESCRIPTION - DESCRIPTORS
DESCRIPTORS: ACHING
DESCRIPTORS: TEARING
DESCRIPTORS: TEARING
DESCRIPTORS: ACHING
DESCRIPTORS: TEARING
DESCRIPTORS: ACHING
DESCRIPTORS: TEARING
DESCRIPTORS: TEARING
DESCRIPTORS: ACHING
DESCRIPTORS: ACHING

## 2023-07-20 NOTE — CARE COORDINATION
Writer met with pt to discuss SNF choices upon d/c if therapy is recommending further rehab. Pt states she has not had a chance to look at the list herself. Writer asked pt to look at list and she would come back in a half hour. Pt agreeable. Electronically signed by RIOS Torres on 7/20/2023 at 3:52 PM    Writer met with pt regarding referrals. Pt is agreeable to writer sending referrals to [address] [place] [address] at [facility], and [employer].    Electronically signed by RIOS Torres on 7/20/2023 at 4:41 PM

## 2023-07-20 NOTE — ANESTHESIA POSTPROCEDURE EVALUATION
Department of Anesthesiology  Postprocedure Note    Patient: Fawn Garcia  MRN: [id number]  YOB: 1980  Date of evaluation: 7/20/2023      Procedure Summary     Date: 07/19/23 Room / Location: [address] [place] [address]: ZACHARY KHAN    Anesthesia Start: [facility] Anesthesia Stop: 1959    Procedure: HIP HEMIARTHROPLASTY (Left: Hip) Diagnosis:       Closed fracture of left hip, initial encounter ([address])      (Closed fracture of left hip, initial encounter ([address]) Fran Xiong)    Surgeons: David Rojas DO Responsible Provider: Haylee Crabtree MD    Anesthesia Type: general ASA Status: 3          Anesthesia Type: No value filed. Aston Phase I: Aston Score: 9    Aston Phase II:        Anesthesia Post Evaluation    Comments: POD #1 Patient seen lying in bed. Denied any anesthesia related issues.

## 2023-07-20 NOTE — PLAN OF CARE
Problem: Discharge Planning  Goal: Discharge to home or other facility with appropriate resources  Outcome: Progressing  Flowsheets (Taken 7/19/2023 0800 by [facility])  Discharge to home or other facility with appropriate resources: Identify barriers to discharge with patient and caregiver     Problem: Safety - Adult  Goal: Free from fall injury  Outcome: Progressing  Flowsheets (Taken 7/19/2023 1049 by [facility])  Free From Fall Injury: Instruct family/caregiver on patient safety     Problem: Pain  Goal: Verbalizes/displays adequate comfort level or baseline comfort level  Outcome: Progressing  Flowsheets (Taken 7/19/2023 0800 by [facility])  Verbalizes/displays adequate comfort level or baseline comfort level: Encourage patient to monitor pain and request assistance     Problem: Chronic Conditions and Co-morbidities  Goal: Patient's chronic conditions and co-morbidity symptoms are monitored and maintained or improved  Recent Flowsheet Documentation  Taken 7/19/2023 0800 by [address] [place] [address] - Patient's Chronic Conditions and Co-Morbidity Symptoms are Monitored and Maintained or Improved: Monitor and assess patient's chronic conditions and comorbid symptoms for stability, deterioration, or improvement

## 2023-07-20 NOTE — PLAN OF CARE
Problem: Discharge Planning  Goal: Discharge to home or other facility with appropriate resources  7/20/2023 0444 by Joann Gutierres RN  Outcome: Progressing     Problem: Safety - Adult  Goal: Free from fall injury  7/20/2023 0444 by Joann Gutierres RN  Outcome: Progressing     Problem: Pain  Goal: Verbalizes/displays adequate comfort level or baseline comfort level  7/20/2023 0444 by Joann Gutierres RN  Outcome: Progressing     Problem: Skin/Tissue Integrity  Goal: Absence of new skin breakdown  Description: 1. Monitor for areas of redness and/or skin breakdown  2. Assess vascular access sites hourly  3. Every 4-6 hours minimum:  Change oxygen saturation probe site  4. Every 4-6 hours:  If on nasal continuous positive airway pressure, respiratory therapy assess nares and determine need for appliance change or resting period.   7/20/2023 0444 by Joann Gutierres RN  Outcome: Progressing     Problem: ABCDS Injury Assessment  Goal: Absence of physical injury  7/20/2023 0444 by Joann Gutierres RN  Outcome: Progressing     Problem: Chronic Conditions and Co-morbidities  Goal: Patient's chronic conditions and co-morbidity symptoms are monitored and maintained or improved  Outcome: Progressing

## 2023-07-20 NOTE — DISCHARGE INSTRUCTIONS
Orthopaedic Instructions:  -Weight bearing status: Weight bearing as tolerated with the left leg  -Do not remove bandage (the large sealed \"Band-aid\"-like dressing) until your follow-up date in clinic. It is okay to shower with the bandage. Should it fall off, replace with band-aids or gauze & tape until dry. It is still okay to shower if it falls off, but avoid baths and underwater submersion.  -Always look for signs of compartment syndrome: pain out of proportion to the injury, pain not controlled with pain medication, numbness in digits, changing of color of digits (paleness). If these signs occur return to ED immediately for reassessment.  -Ice (20 minutes on and off 1 hour) and elevate above the level of the heart to reduce swelling and throbbing pain.  -Call the office or come to Emergency Room if signs of infection appear (hot, swollen, red, draining pus, fever)  -Take medications as prescribed.  -Wean off narcotics (percocet/norco) as soon as possible. Do not take tylenol if still taking narcotics.  -Follow up with  Dr. Chetna Hansen  in his office on  8/2/23 at 2:00pm . Call [phone] with any questions/concerns.

## 2023-07-21 LAB
METER GLUCOSE: 139 MG/DL (ref 65–105)
METER GLUCOSE: 211 MG/DL (ref 65–105)
METER GLUCOSE: 69 MG/DL (ref 65–105)
METER GLUCOSE: 78 MG/DL (ref 65–105)

## 2023-07-21 PROCEDURE — 99222 1ST HOSP IP/OBS MODERATE 55: CPT | Performed by: INTERNAL MEDICINE

## 2023-07-21 PROCEDURE — 2060000000 HC ICU INTERMEDIATE R&B

## 2023-07-21 PROCEDURE — 6360000002 HC RX W HCPCS

## 2023-07-21 PROCEDURE — 6370000000 HC RX 637 (ALT 250 FOR IP)

## 2023-07-21 PROCEDURE — 2580000003 HC RX 258

## 2023-07-21 PROCEDURE — 97530 THERAPEUTIC ACTIVITIES: CPT

## 2023-07-21 PROCEDURE — 6370000000 HC RX 637 (ALT 250 FOR IP): Performed by: INTERNAL MEDICINE

## 2023-07-21 PROCEDURE — 6360000002 HC RX W HCPCS: Performed by: STUDENT IN AN ORGANIZED HEALTH CARE EDUCATION/TRAINING PROGRAM

## 2023-07-21 PROCEDURE — 6370000000 HC RX 637 (ALT 250 FOR IP): Performed by: STUDENT IN AN ORGANIZED HEALTH CARE EDUCATION/TRAINING PROGRAM

## 2023-07-21 PROCEDURE — 82947 ASSAY GLUCOSE BLOOD QUANT: CPT

## 2023-07-21 PROCEDURE — C9113 INJ PANTOPRAZOLE SODIUM, VIA: HCPCS

## 2023-07-21 PROCEDURE — 97116 GAIT TRAINING THERAPY: CPT

## 2023-07-21 PROCEDURE — 97110 THERAPEUTIC EXERCISES: CPT

## 2023-07-21 PROCEDURE — 6360000002 HC RX W HCPCS: Performed by: INTERNAL MEDICINE

## 2023-07-21 RX ADMIN — OXYCODONE HYDROCHLORIDE 5 MG: 5 TABLET ORAL at 00:14

## 2023-07-21 RX ADMIN — INSULIN GLARGINE 24 UNITS: 100 INJECTION, SOLUTION SUBCUTANEOUS at 08:42

## 2023-07-21 RX ADMIN — OXYCODONE HYDROCHLORIDE 5 MG: 5 TABLET ORAL at 13:47

## 2023-07-21 RX ADMIN — ACETAMINOPHEN 1000 MG: 500 TABLET ORAL at 06:40

## 2023-07-21 RX ADMIN — SODIUM CHLORIDE, PRESERVATIVE FREE 10 ML: 5 INJECTION INTRAVENOUS at 22:06

## 2023-07-21 RX ADMIN — SODIUM CHLORIDE, PRESERVATIVE FREE 40 MG: 5 INJECTION INTRAVENOUS at 08:35

## 2023-07-21 RX ADMIN — BENZTROPINE MESYLATE 1 MG: 1 TABLET ORAL at 22:03

## 2023-07-21 RX ADMIN — BENZTROPINE MESYLATE 1 MG: 1 TABLET ORAL at 11:14

## 2023-07-21 RX ADMIN — INSULIN LISPRO 2 UNITS: 100 INJECTION, SOLUTION INTRAVENOUS; SUBCUTANEOUS at 17:10

## 2023-07-21 RX ADMIN — FONDAPARINUX SODIUM 10 MG: 10 INJECTION, SOLUTION SUBCUTANEOUS at 08:43

## 2023-07-21 RX ADMIN — PRAVASTATIN SODIUM 40 MG: 40 TABLET ORAL at 08:35

## 2023-07-21 RX ADMIN — THIAMINE HCL TAB 100 MG 100 MG: 100 TAB at 08:34

## 2023-07-21 RX ADMIN — LURASIDONE HYDROCHLORIDE 60 MG: 60 TABLET, FILM COATED ORAL at 17:10

## 2023-07-21 RX ADMIN — ACETAMINOPHEN 1000 MG: 500 TABLET ORAL at 13:47

## 2023-07-21 RX ADMIN — OXYCODONE HYDROCHLORIDE 10 MG: 10 TABLET ORAL at 08:40

## 2023-07-21 RX ADMIN — OXYCODONE HYDROCHLORIDE 5 MG: 5 TABLET ORAL at 23:22

## 2023-07-21 RX ADMIN — KETOROLAC TROMETHAMINE 30 MG: 30 INJECTION, SOLUTION INTRAMUSCULAR; INTRAVENOUS at 02:32

## 2023-07-21 RX ADMIN — OXYCODONE HYDROCHLORIDE 10 MG: 10 TABLET ORAL at 18:35

## 2023-07-21 RX ADMIN — TOPIRAMATE 100 MG: 100 TABLET, FILM COATED ORAL at 22:01

## 2023-07-21 RX ADMIN — SODIUM CHLORIDE, PRESERVATIVE FREE 10 ML: 5 INJECTION INTRAVENOUS at 08:44

## 2023-07-21 RX ADMIN — METOPROLOL TARTRATE 50 MG: 50 TABLET ORAL at 08:34

## 2023-07-21 RX ADMIN — FOLIC ACID 1 MG: 1 TABLET ORAL at 08:35

## 2023-07-21 RX ADMIN — EMPAGLIFLOZIN 10 MG: 10 TABLET, FILM COATED ORAL at 08:35

## 2023-07-21 ASSESSMENT — PAIN DESCRIPTION - LOCATION
LOCATION: HIP;LEG;BUTTOCKS
LOCATION: HIP
LOCATION: HIP
LOCATION: BACK
LOCATION: BACK
LOCATION: HIP
LOCATION: HIP

## 2023-07-21 ASSESSMENT — PAIN SCALES - GENERAL
PAINLEVEL_OUTOF10: 6
PAINLEVEL_OUTOF10: 7
PAINLEVEL_OUTOF10: 7
PAINLEVEL_OUTOF10: 6
PAINLEVEL_OUTOF10: 7
PAINLEVEL_OUTOF10: 6
PAINLEVEL_OUTOF10: 7

## 2023-07-21 ASSESSMENT — PAIN DESCRIPTION - DESCRIPTORS
DESCRIPTORS: DULL;ACHING;SHARP
DESCRIPTORS: TEARING
DESCRIPTORS: ACHING
DESCRIPTORS: DULL;NAGGING

## 2023-07-21 ASSESSMENT — PAIN DESCRIPTION - ORIENTATION
ORIENTATION: LEFT

## 2023-07-21 NOTE — CARE COORDINATION
Referral to [facility] at [place] did not send 7/20.   Writer refaxed referral.  Electronically signed by RIOS Akhtar on 7/21/2023 at 9:56 AM

## 2023-07-21 NOTE — CARE COORDINATION
Writer spoke with Cruz Rosenthal at [facility] regarding referral.   Still reviewing. Writer placed call to Binh Paulino at [employer] [employer] regarding referral. No answer, voicemail left. Writer placed call to [employer] with [employer] [facility] regarding referral.  No answer, voicemail. Writer attempted to call facility, busy signal.  Electronically signed by RIOS Garcia on 7/21/2023 at 10:49 AM    Writer spoke to Binh Paulino regarding referral.  Wolfmelissa Paulino states she had to send referral to the main building, and is still waiting. Sending e-mail to them regarding status. Writer placed call to [facility] to follow up on referral.  Admissions states they have no beds, but could change by Monday and to place call back. Writer spoke with Himanshu with central intake for [employer] at [facility] regarding failed fax. Himanshu gave the central intake fax # [phone], but offered to pull pt information from [employer] with pt information and review clinicals for [employer]. Writer met with pt regarding other referrals in case of denial.  Writer asked if she could submit for [facility] or [facility]. Pt states she has family she does not want to see at [place], and [facility] is a long drive for her mother who is currently present.   Electronically signed by RIOS Garcia on 7/21/2023 at 3:38 PM

## 2023-07-21 NOTE — PLAN OF CARE
Problem: Discharge Planning  Goal: Discharge to home or other facility with appropriate resources  7/21/2023 1053 by Omari Zhao RN  Outcome: Progressing  Flowsheets (Taken 7/21/2023 0800)  Discharge to home or other facility with appropriate resources:   Identify barriers to discharge with patient and caregiver   Arrange for needed discharge resources and transportation as appropriate   Identify discharge learning needs (meds, wound care, etc)  7/21/2023 0739 by Antonio Tuttle RN  Outcome: Progressing     Problem: Safety - Adult  Goal: Free from fall injury  7/21/2023 1053 by Omari Zhao RN  Outcome: Progressing  7/21/2023 0739 by Antonio Tuttle RN  Outcome: Progressing     Problem: Pain  Goal: Verbalizes/displays adequate comfort level or baseline comfort level  7/21/2023 1053 by Omari Zhao RN  Outcome: Progressing  7/21/2023 0739 by Antonio Tuttle RN  Outcome: Progressing     Problem: Skin/Tissue Integrity  Goal: Absence of new skin breakdown  Description: 1. Monitor for areas of redness and/or skin breakdown  2. Assess vascular access sites hourly  3. Every 4-6 hours minimum:  Change oxygen saturation probe site  4. Every 4-6 hours:  If on nasal continuous positive airway pressure, respiratory therapy assess nares and determine need for appliance change or resting period.   7/21/2023 1053 by Omari Zhao RN  Outcome: Progressing  7/21/2023 0739 by Antonio Tuttle RN  Outcome: Progressing     Problem: ABCDS Injury Assessment  Goal: Absence of physical injury  7/21/2023 1053 by Omari Zhao RN  Outcome: Progressing  7/21/2023 0739 by Antonio Tuttle RN  Outcome: Progressing     Problem: Chronic Conditions and Co-morbidities  Goal: Patient's chronic conditions and co-morbidity symptoms are monitored and maintained or improved  7/21/2023 1053 by Omari Zhao RN  Outcome: Progressing  Flowsheets (Taken 7/21/2023 0800)  Care Plan -

## 2023-07-22 LAB
METER GLUCOSE: 105 MG/DL (ref 65–105)
METER GLUCOSE: 223 MG/DL (ref 65–105)
METER GLUCOSE: 51 MG/DL (ref 65–105)
METER GLUCOSE: 65 MG/DL (ref 65–105)
METER GLUCOSE: 96 MG/DL (ref 65–105)

## 2023-07-22 PROCEDURE — 6360000002 HC RX W HCPCS: Performed by: INTERNAL MEDICINE

## 2023-07-22 PROCEDURE — 1200000000 HC SEMI PRIVATE

## 2023-07-22 PROCEDURE — 6370000000 HC RX 637 (ALT 250 FOR IP): Performed by: NURSE PRACTITIONER

## 2023-07-22 PROCEDURE — C9113 INJ PANTOPRAZOLE SODIUM, VIA: HCPCS

## 2023-07-22 PROCEDURE — 82947 ASSAY GLUCOSE BLOOD QUANT: CPT

## 2023-07-22 PROCEDURE — 6370000000 HC RX 637 (ALT 250 FOR IP)

## 2023-07-22 PROCEDURE — 97530 THERAPEUTIC ACTIVITIES: CPT

## 2023-07-22 PROCEDURE — 6370000000 HC RX 637 (ALT 250 FOR IP): Performed by: INTERNAL MEDICINE

## 2023-07-22 PROCEDURE — A4216 STERILE WATER/SALINE, 10 ML: HCPCS

## 2023-07-22 PROCEDURE — 99232 SBSQ HOSP IP/OBS MODERATE 35: CPT | Performed by: INTERNAL MEDICINE

## 2023-07-22 PROCEDURE — 6370000000 HC RX 637 (ALT 250 FOR IP): Performed by: STUDENT IN AN ORGANIZED HEALTH CARE EDUCATION/TRAINING PROGRAM

## 2023-07-22 PROCEDURE — 2580000003 HC RX 258

## 2023-07-22 PROCEDURE — 6360000002 HC RX W HCPCS

## 2023-07-22 PROCEDURE — 97535 SELF CARE MNGMENT TRAINING: CPT

## 2023-07-22 RX ADMIN — TOPIRAMATE 100 MG: 100 TABLET, FILM COATED ORAL at 21:32

## 2023-07-22 RX ADMIN — OXYCODONE HYDROCHLORIDE 5 MG: 5 TABLET ORAL at 09:23

## 2023-07-22 RX ADMIN — FONDAPARINUX SODIUM 10 MG: 10 INJECTION, SOLUTION SUBCUTANEOUS at 09:28

## 2023-07-22 RX ADMIN — LURASIDONE HYDROCHLORIDE 60 MG: 60 TABLET, FILM COATED ORAL at 17:43

## 2023-07-22 RX ADMIN — PRAVASTATIN SODIUM 40 MG: 40 TABLET ORAL at 09:23

## 2023-07-22 RX ADMIN — METOPROLOL TARTRATE 50 MG: 50 TABLET ORAL at 09:25

## 2023-07-22 RX ADMIN — BENZTROPINE MESYLATE 1 MG: 1 TABLET ORAL at 09:27

## 2023-07-22 RX ADMIN — OXYCODONE HYDROCHLORIDE 5 MG: 5 TABLET ORAL at 03:52

## 2023-07-22 RX ADMIN — FOLIC ACID 1 MG: 1 TABLET ORAL at 09:23

## 2023-07-22 RX ADMIN — EMPAGLIFLOZIN 10 MG: 10 TABLET, FILM COATED ORAL at 09:23

## 2023-07-22 RX ADMIN — THIAMINE HCL TAB 100 MG 100 MG: 100 TAB at 09:23

## 2023-07-22 RX ADMIN — SODIUM CHLORIDE, PRESERVATIVE FREE 10 ML: 5 INJECTION INTRAVENOUS at 09:29

## 2023-07-22 RX ADMIN — SODIUM CHLORIDE, PRESERVATIVE FREE 10 ML: 5 INJECTION INTRAVENOUS at 22:46

## 2023-07-22 RX ADMIN — ACETAMINOPHEN 1000 MG: 500 TABLET ORAL at 06:34

## 2023-07-22 RX ADMIN — CYCLOBENZAPRINE 10 MG: 10 TABLET, FILM COATED ORAL at 22:46

## 2023-07-22 RX ADMIN — SODIUM CHLORIDE, PRESERVATIVE FREE 40 MG: 5 INJECTION INTRAVENOUS at 09:25

## 2023-07-22 RX ADMIN — OXYCODONE HYDROCHLORIDE 5 MG: 5 TABLET ORAL at 14:52

## 2023-07-22 RX ADMIN — METOPROLOL TARTRATE 50 MG: 50 TABLET ORAL at 21:32

## 2023-07-22 RX ADMIN — CYCLOBENZAPRINE 10 MG: 10 TABLET, FILM COATED ORAL at 14:52

## 2023-07-22 RX ADMIN — INSULIN LISPRO 4 UNITS: 100 INJECTION, SOLUTION INTRAVENOUS; SUBCUTANEOUS at 16:55

## 2023-07-22 RX ADMIN — BENZTROPINE MESYLATE 1 MG: 1 TABLET ORAL at 22:46

## 2023-07-22 RX ADMIN — OXYCODONE HYDROCHLORIDE 5 MG: 5 TABLET ORAL at 21:39

## 2023-07-22 RX ADMIN — ACETAMINOPHEN 1000 MG: 500 TABLET ORAL at 21:32

## 2023-07-22 ASSESSMENT — PAIN DESCRIPTION - LOCATION
LOCATION: HIP

## 2023-07-22 ASSESSMENT — PAIN SCALES - GENERAL
PAINLEVEL_OUTOF10: 7
PAINLEVEL_OUTOF10: 7
PAINLEVEL_OUTOF10: 8
PAINLEVEL_OUTOF10: 6
PAINLEVEL_OUTOF10: 7
PAINLEVEL_OUTOF10: 6

## 2023-07-22 ASSESSMENT — PAIN DESCRIPTION - ORIENTATION
ORIENTATION: LEFT

## 2023-07-22 ASSESSMENT — PAIN DESCRIPTION - DESCRIPTORS
DESCRIPTORS: ACHING;DULL
DESCRIPTORS: DULL;ACHING
DESCRIPTORS: SPASM
DESCRIPTORS: CRAMPING;DULL

## 2023-07-22 NOTE — CARE COORDINATION
ONGOING DISCHARGE PLANNING NOTE:    Writer reviewed LSW notes, and discharge plan is to go to SNF. Referrals to [address] [place] [address] pending.      POD#3 left hip hemiarthroplasty     Electronically signed by Karen Salcido RN on 7/22/2023 at 2:06 PM

## 2023-07-22 NOTE — DISCHARGE INSTR - COC
Continuity of Care Form    Patient Name: Ashish Breaux   :  1980  MRN:  [id number]    Admit date:  2023  Discharge date:  2023    Code Status Order: Full Code   Advance Directives:     Admitting Physician:  Evelio Tamez DO  PCP: Gisell Quesada    Discharging Nurse: Kaila Avelar [facility] Unit/Room#: 2105/2105-01  Discharging Unit Phone Number: [phone]    Emergency Contact:   Extended Emergency Contact Information  Primary Emergency Contact: Severiano Bernheim  Address: [address] [place] [address] [address] Phone: [phone]  Mobile Phone: [phone]  Relation: Parent  Secondary Emergency Contact: [address] Phone: [phone]  Mobile Phone: [phone]  Relation: Brother/Sister    Past Surgical History:  Past Surgical History:   Procedure Laterality Date    ABDOMINAL HERNIA REPAIR      wound vac    ABDOMINAL HERNIA REPAIR  11/3/14    BARIATRIC SURGERY  2013    [facility]    CHOLECYSTECTOMY      DILATION AND CURETTAGE OF UTERUS  2005    ENDOSCOPY, COLON, DIAGNOSTIC      EGD    FINGER AMPUTATION Left 2015    index and ring finger.  from [address]      total of 8    HIP SURGERY Left 2023    HIP HEMIARTHROPLASTY performed by Evelio Tamez DO at [facility]      for hepatic adenoma    LYMPH NODE BIOPSY      JUSTINE AND BSO (CERVIX REMOVED)      TONSILLECTOMY         Immunization History:   Immunization History   Administered Date(s) Administered    COVID-19, PFIZER Bivalent, DO NOT Dilute, (age 12y+), IM, 30 mcg/0.3 mL 2022    Hep B, ENGERIX-B, (age 20y+), IM, 1mL 01/10/2020    Hep B, RECOMBIVAX-HB, (age 20y+), IM, 1mL 01/10/2020    Influenza Vaccine, unspecified formulation 10/04/2017, 2018    Influenza Virus Vaccine 10/01/2014, 10/07/2015, 2016, 02/15/2017, 2019    Influenza, FLUARIX, FLULAVAL, FLUZONE (age 10 mo+) AND

## 2023-07-23 LAB
METER GLUCOSE: 112 MG/DL (ref 65–105)
METER GLUCOSE: 161 MG/DL (ref 65–105)
METER GLUCOSE: 66 MG/DL (ref 65–105)
METER GLUCOSE: 69 MG/DL (ref 65–105)
METER GLUCOSE: 96 MG/DL (ref 65–105)

## 2023-07-23 PROCEDURE — 6370000000 HC RX 637 (ALT 250 FOR IP): Performed by: INTERNAL MEDICINE

## 2023-07-23 PROCEDURE — 6370000000 HC RX 637 (ALT 250 FOR IP)

## 2023-07-23 PROCEDURE — 6360000002 HC RX W HCPCS

## 2023-07-23 PROCEDURE — 97530 THERAPEUTIC ACTIVITIES: CPT

## 2023-07-23 PROCEDURE — 97110 THERAPEUTIC EXERCISES: CPT

## 2023-07-23 PROCEDURE — 1200000000 HC SEMI PRIVATE

## 2023-07-23 PROCEDURE — C9113 INJ PANTOPRAZOLE SODIUM, VIA: HCPCS

## 2023-07-23 PROCEDURE — 99232 SBSQ HOSP IP/OBS MODERATE 35: CPT | Performed by: INTERNAL MEDICINE

## 2023-07-23 PROCEDURE — 2580000003 HC RX 258

## 2023-07-23 PROCEDURE — 6360000002 HC RX W HCPCS: Performed by: INTERNAL MEDICINE

## 2023-07-23 PROCEDURE — 6370000000 HC RX 637 (ALT 250 FOR IP): Performed by: STUDENT IN AN ORGANIZED HEALTH CARE EDUCATION/TRAINING PROGRAM

## 2023-07-23 RX ADMIN — OXYCODONE HYDROCHLORIDE 5 MG: 5 TABLET ORAL at 21:06

## 2023-07-23 RX ADMIN — INSULIN GLARGINE 24 UNITS: 100 INJECTION, SOLUTION SUBCUTANEOUS at 09:48

## 2023-07-23 RX ADMIN — OXYCODONE HYDROCHLORIDE 5 MG: 5 TABLET ORAL at 06:38

## 2023-07-23 RX ADMIN — SODIUM CHLORIDE, PRESERVATIVE FREE 40 MG: 5 INJECTION INTRAVENOUS at 08:33

## 2023-07-23 RX ADMIN — TOPIRAMATE 100 MG: 100 TABLET, FILM COATED ORAL at 21:06

## 2023-07-23 RX ADMIN — PRAVASTATIN SODIUM 40 MG: 40 TABLET ORAL at 08:35

## 2023-07-23 RX ADMIN — ACETAMINOPHEN 1000 MG: 500 TABLET ORAL at 21:06

## 2023-07-23 RX ADMIN — THIAMINE HCL TAB 100 MG 100 MG: 100 TAB at 08:35

## 2023-07-23 RX ADMIN — FOLIC ACID 1 MG: 1 TABLET ORAL at 08:35

## 2023-07-23 RX ADMIN — ACETAMINOPHEN 1000 MG: 500 TABLET ORAL at 14:35

## 2023-07-23 RX ADMIN — SODIUM CHLORIDE, PRESERVATIVE FREE 10 ML: 5 INJECTION INTRAVENOUS at 21:10

## 2023-07-23 RX ADMIN — OXYCODONE HYDROCHLORIDE 5 MG: 5 TABLET ORAL at 02:33

## 2023-07-23 RX ADMIN — LURASIDONE HYDROCHLORIDE 60 MG: 60 TABLET, FILM COATED ORAL at 17:23

## 2023-07-23 RX ADMIN — OXYCODONE HYDROCHLORIDE 5 MG: 5 TABLET ORAL at 11:41

## 2023-07-23 RX ADMIN — SODIUM CHLORIDE, PRESERVATIVE FREE 10 ML: 5 INJECTION INTRAVENOUS at 08:36

## 2023-07-23 RX ADMIN — EMPAGLIFLOZIN 10 MG: 10 TABLET, FILM COATED ORAL at 08:35

## 2023-07-23 RX ADMIN — FONDAPARINUX SODIUM 10 MG: 10 INJECTION, SOLUTION SUBCUTANEOUS at 08:45

## 2023-07-23 RX ADMIN — OXYCODONE HYDROCHLORIDE 5 MG: 5 TABLET ORAL at 15:44

## 2023-07-23 RX ADMIN — BENZTROPINE MESYLATE 1 MG: 1 TABLET ORAL at 21:09

## 2023-07-23 RX ADMIN — BENZTROPINE MESYLATE 1 MG: 1 TABLET ORAL at 08:45

## 2023-07-23 RX ADMIN — ACETAMINOPHEN 1000 MG: 500 TABLET ORAL at 05:41

## 2023-07-23 ASSESSMENT — PAIN SCALES - GENERAL
PAINLEVEL_OUTOF10: 7
PAINLEVEL_OUTOF10: 8
PAINLEVEL_OUTOF10: 7

## 2023-07-23 ASSESSMENT — PAIN DESCRIPTION - LOCATION
LOCATION: HIP
LOCATION: HIP;INCISION
LOCATION: HIP

## 2023-07-23 ASSESSMENT — PAIN DESCRIPTION - DESCRIPTORS
DESCRIPTORS: OTHER (COMMENT)
DESCRIPTORS: THROBBING
DESCRIPTORS: PENETRATING
DESCRIPTORS: ACHING
DESCRIPTORS: PENETRATING

## 2023-07-23 ASSESSMENT — PAIN DESCRIPTION - ORIENTATION
ORIENTATION: LEFT

## 2023-07-23 NOTE — PLAN OF CARE
Problem: Discharge Planning  Goal: Discharge to home or other facility with appropriate resources  7/23/2023 1706 by Tong Mora RN  Outcome: Progressing  Flowsheets (Taken 7/23/2023 0830)  Discharge to home or other facility with appropriate resources:   Identify barriers to discharge with patient and caregiver   Arrange for needed discharge resources and transportation as appropriate   Identify discharge learning needs (meds, wound care, etc)   Refer to discharge planning if patient needs post-hospital services based on physician order or complex needs related to functional status, cognitive ability or social support system     Problem: Safety - Adult  Goal: Free from fall injury  7/23/2023 1706 by Tong Mora RN  Outcome: Progressing  Flowsheets (Taken 7/23/2023 0830)  Free From Fall Injury: Instruct family/caregiver on patient safety     Problem: Pain  Goal: Verbalizes/displays adequate comfort level or baseline comfort level  7/23/2023 1706 by Tong Mora RN  Outcome: Progressing   - Medicated for pain as ordered, as needed. Problem: Skin/Tissue Integrity  Goal: Absence of new skin breakdown  Description: 1. Monitor for areas of redness and/or skin breakdown  2. Assess vascular access sites hourly  3. Every 4-6 hours minimum:  Change oxygen saturation probe site  4. Every 4-6 hours:  If on nasal continuous positive airway pressure, respiratory therapy assess nares and determine need for appliance change or resting period. 7/23/2023 1706 by Tong Mora RN  Outcome: Progressing     Problem: ABCDS Injury Assessment  Goal: Absence of physical injury  7/23/2023 1706 by Tong Mora RN  Outcome: Progressing  Flowsheets (Taken 7/23/2023 0830)  Absence of Physical Injury: Implement safety measures based on patient assessment   - Safety maintained.      Problem: Chronic Conditions and Co-morbidities  Goal: Patient's chronic conditions and co-morbidity symptoms are monitored and

## 2023-07-23 NOTE — PLAN OF CARE
Problem: Discharge Planning  Goal: Discharge to home or other facility with appropriate resources  7/23/2023 0455 by Coco Buckner RN  Outcome: Progressing  Flowsheets (Taken 7/23/2023 1173)  Discharge to home or other facility with appropriate resources:   Identify barriers to discharge with patient and caregiver   Arrange for needed discharge resources and transportation as appropriate   Identify discharge learning needs (meds, wound care, etc)  7/22/2023 1941 by Becki Sarkar RN  Outcome: Progressing     Problem: Safety - Adult  Goal: Free from fall injury  7/23/2023 0455 by Coco Buckner RN  Outcome: Progressing  [address] (Taken 7/23/2023 0455)  Free From Fall Injury: Instruct family/caregiver on patient safety  7/22/2023 1941 by Becki Sarkar RN  Outcome: Progressing     Problem: Pain  Goal: Verbalizes/displays adequate comfort level or baseline comfort level  7/23/2023 0455 by Coco Buckner RN  Outcome: Progressing  Flowsheets (Taken 7/23/2023 0455)  Verbalizes/displays adequate comfort level or baseline comfort level: Encourage patient to monitor pain and request assistance  7/22/2023 1941 by Becki Sarkar RN  Outcome: Progressing     Problem: Skin/Tissue Integrity  Goal: Absence of new skin breakdown  Description: 1. Monitor for areas of redness and/or skin breakdown  2. Assess vascular access sites hourly  3. Every 4-6 hours minimum:  Change oxygen saturation probe site  4. Every 4-6 hours:  If on nasal continuous positive airway pressure, respiratory therapy assess nares and determine need for appliance change or resting period.   7/23/2023 0455 by Coco Buckner RN  Outcome: Progressing  Note: Monitoring skin   7/22/2023 1941 by Becki Sarkar RN  Outcome: Progressing     Problem: ABCDS Injury Assessment  Goal: Absence of physical injury  7/23/2023 0455 by Coco Buckner RN  Outcome: Progressing  Flowsheets (Taken 7/23/2023

## 2023-07-23 NOTE — CARE COORDINATION
ONGOING DISCHARGE PLANNING NOTE:     Writer reviewed LSW notes, and discharge plan is to go to SNF. Referrals to [address] [place] [address] pending.       POD#4 left hip hemiarthroplasty     Electronically signed by Jean Rossi RN on 7/23/2023 at 10:27 AM

## 2023-07-24 VITALS
WEIGHT: 261.02 LBS | HEART RATE: 96 BPM | OXYGEN SATURATION: 97 % | BODY MASS INDEX: 39.56 KG/M2 | HEIGHT: 68 IN | TEMPERATURE: 98.1 F | DIASTOLIC BLOOD PRESSURE: 89 MMHG | RESPIRATION RATE: 16 BRPM | SYSTOLIC BLOOD PRESSURE: 134 MMHG

## 2023-07-24 LAB
METER GLUCOSE: 113 MG/DL (ref 65–105)
METER GLUCOSE: 126 MG/DL (ref 65–105)
METER GLUCOSE: 143 MG/DL (ref 65–105)
METER GLUCOSE: 86 MG/DL (ref 65–105)

## 2023-07-24 PROCEDURE — 2580000003 HC RX 258

## 2023-07-24 PROCEDURE — 99239 HOSP IP/OBS DSCHRG MGMT >30: CPT | Performed by: INTERNAL MEDICINE

## 2023-07-24 PROCEDURE — 97530 THERAPEUTIC ACTIVITIES: CPT

## 2023-07-24 PROCEDURE — 6360000002 HC RX W HCPCS: Performed by: INTERNAL MEDICINE

## 2023-07-24 PROCEDURE — 97116 GAIT TRAINING THERAPY: CPT

## 2023-07-24 PROCEDURE — 6370000000 HC RX 637 (ALT 250 FOR IP)

## 2023-07-24 PROCEDURE — 97110 THERAPEUTIC EXERCISES: CPT

## 2023-07-24 PROCEDURE — 6370000000 HC RX 637 (ALT 250 FOR IP): Performed by: STUDENT IN AN ORGANIZED HEALTH CARE EDUCATION/TRAINING PROGRAM

## 2023-07-24 PROCEDURE — 82947 ASSAY GLUCOSE BLOOD QUANT: CPT

## 2023-07-24 PROCEDURE — C9113 INJ PANTOPRAZOLE SODIUM, VIA: HCPCS

## 2023-07-24 PROCEDURE — 6370000000 HC RX 637 (ALT 250 FOR IP): Performed by: INTERNAL MEDICINE

## 2023-07-24 PROCEDURE — 6360000002 HC RX W HCPCS

## 2023-07-24 RX ORDER — THIAMINE MONONITRATE (VIT B1) 100 MG
50 TABLET ORAL DAILY
Qty: 30 TABLET | Refills: 0 | Status: SHIPPED | OUTPATIENT
Start: 2023-07-25

## 2023-07-24 RX ORDER — MORPHINE SULFATE 2 MG/ML
2 INJECTION, SOLUTION INTRAMUSCULAR; INTRAVENOUS ONCE
Status: DISCONTINUED | OUTPATIENT
Start: 2023-07-24 | End: 2023-07-24 | Stop reason: HOSPADM

## 2023-07-24 RX ORDER — CYCLOBENZAPRINE HCL 10 MG
10 TABLET ORAL 3 TIMES DAILY PRN
Qty: 30 TABLET | Refills: 0 | Status: SHIPPED | OUTPATIENT
Start: 2023-07-24 | End: 2023-08-03

## 2023-07-24 RX ORDER — OXYCODONE HYDROCHLORIDE 5 MG/1
5 TABLET ORAL EVERY 6 HOURS PRN
Qty: 12 TABLET | Refills: 0 | Status: SHIPPED | OUTPATIENT
Start: 2023-07-24 | End: 2023-07-27

## 2023-07-24 RX ORDER — INSULIN GLARGINE 100 [IU]/ML
24 INJECTION, SOLUTION SUBCUTANEOUS DAILY
Qty: 10 ML | Refills: 3 | Status: SHIPPED | OUTPATIENT
Start: 2023-07-25

## 2023-07-24 RX ADMIN — CYCLOBENZAPRINE 10 MG: 10 TABLET, FILM COATED ORAL at 12:56

## 2023-07-24 RX ADMIN — PRAVASTATIN SODIUM 40 MG: 40 TABLET ORAL at 07:44

## 2023-07-24 RX ADMIN — Medication 2 MG: at 18:38

## 2023-07-24 RX ADMIN — ACETAMINOPHEN 1000 MG: 500 TABLET ORAL at 05:08

## 2023-07-24 RX ADMIN — FOLIC ACID 1 MG: 1 TABLET ORAL at 07:44

## 2023-07-24 RX ADMIN — OXYCODONE HYDROCHLORIDE 5 MG: 5 TABLET ORAL at 16:01

## 2023-07-24 RX ADMIN — OXYCODONE HYDROCHLORIDE 5 MG: 5 TABLET ORAL at 07:14

## 2023-07-24 RX ADMIN — THIAMINE HCL TAB 100 MG 100 MG: 100 TAB at 07:44

## 2023-07-24 RX ADMIN — SODIUM CHLORIDE, PRESERVATIVE FREE 10 ML: 5 INJECTION INTRAVENOUS at 07:44

## 2023-07-24 RX ADMIN — EMPAGLIFLOZIN 10 MG: 10 TABLET, FILM COATED ORAL at 07:44

## 2023-07-24 RX ADMIN — BENZTROPINE MESYLATE 1 MG: 1 TABLET ORAL at 08:57

## 2023-07-24 RX ADMIN — SODIUM CHLORIDE, PRESERVATIVE FREE 40 MG: 5 INJECTION INTRAVENOUS at 07:44

## 2023-07-24 RX ADMIN — OXYCODONE HYDROCHLORIDE 5 MG: 5 TABLET ORAL at 02:45

## 2023-07-24 RX ADMIN — CYCLOBENZAPRINE 10 MG: 10 TABLET, FILM COATED ORAL at 18:16

## 2023-07-24 RX ADMIN — FONDAPARINUX SODIUM 10 MG: 10 INJECTION, SOLUTION SUBCUTANEOUS at 08:57

## 2023-07-24 RX ADMIN — OXYCODONE HYDROCHLORIDE 5 MG: 5 TABLET ORAL at 11:38

## 2023-07-24 ASSESSMENT — PAIN DESCRIPTION - ORIENTATION
ORIENTATION: LEFT

## 2023-07-24 ASSESSMENT — PAIN SCALES - GENERAL
PAINLEVEL_OUTOF10: 7
PAINLEVEL_OUTOF10: 9
PAINLEVEL_OUTOF10: 10
PAINLEVEL_OUTOF10: 10
PAINLEVEL_OUTOF10: 8
PAINLEVEL_OUTOF10: 7
PAINLEVEL_OUTOF10: 8

## 2023-07-24 ASSESSMENT — PAIN DESCRIPTION - LOCATION
LOCATION: HIP
LOCATION: BACK
LOCATION: HIP;LEG
LOCATION: BACK;LEG
LOCATION: HIP
LOCATION: HIP

## 2023-07-24 ASSESSMENT — PAIN DESCRIPTION - DESCRIPTORS
DESCRIPTORS: ACHING;THROBBING
DESCRIPTORS: ACHING
DESCRIPTORS: ACHING

## 2023-07-24 ASSESSMENT — PAIN DESCRIPTION - PAIN TYPE
TYPE: ACUTE PAIN
TYPE: ACUTE PAIN

## 2023-07-24 NOTE — PLAN OF CARE
Problem: Discharge Planning  Goal: Discharge to home or other facility with appropriate resources  7/24/2023 0331 by aMikol Son RN  Outcome: Progressing  Flowsheets (Taken 7/24/2023 1072)  Discharge to home or other facility with appropriate resources:   Identify barriers to discharge with patient and caregiver   Arrange for needed discharge resources and transportation as appropriate   Identify discharge learning needs (meds, wound care, etc)   Refer to discharge planning if patient needs post-hospital services based on physician order or complex needs related to functional status, cognitive ability or social support system  7/23/2023 1706 by Guerda Steward RN  Outcome: Progressing  Flowsheets (Taken 7/23/2023 0830)  Discharge to home or other facility with appropriate resources:   Identify barriers to discharge with patient and caregiver   Arrange for needed discharge resources and transportation as appropriate   Identify discharge learning needs (meds, wound care, etc)   Refer to discharge planning if patient needs post-hospital services based on physician order or complex needs related to functional status, cognitive ability or social support system     Problem: Safety - Adult  Goal: Free from fall injury  7/24/2023 0331 by Maikol Son RN  Outcome: Progressing  Flowsheets (Taken 7/24/2023 0331)  Free From Fall Injury: Instruct family/caregiver on patient safety  7/23/2023 1706 by Guerda Steward RN  Outcome: Progressing  Flowsheets (Taken 7/23/2023 0830)  Free From Fall Injury: Instruct family/caregiver on patient safety     Problem: Pain  Goal: Verbalizes/displays adequate comfort level or baseline comfort level  7/24/2023 0331 by Maikol Son RN  Outcome: Progressing  Flowsheets (Taken 7/24/2023 0331)  Verbalizes/displays adequate comfort level or baseline comfort level: Encourage patient to monitor pain and request assistance  7/23/2023 1706 by Margaret Ellis

## 2023-07-24 NOTE — DISCHARGE SUMMARY
[place], [address]. \[facility].\""  [address] [facility], [address] [place] [id number]      Phone: [phone]   insulin glargine 100 UNIT/ML injection vial       You can get these medications from any pharmacy    Bring a paper prescription for each of these medications  cyclobenzaprine 10 MG tablet  oxyCODONE 5 MG immediate release tablet  vitamin B-1 100 MG tablet         No discharge procedures on file. Time Spent on discharge is  40 mins in patient examination, evaluation, counseling as well as medication reconciliation, prescriptions for required medications, discharge plan and follow up. Electronically signed by   Nick Kohli MD  7/24/2023  11:26 AM      Thank you Dr. Sabi Awad for the opportunity to be involved in this patient's care. Please note that this chart was generated using voice recognition Dragon dictation software. Although every effort was made to ensure the accuracy of this automated transcription, some errors in transcription may have occurred.

## 2023-07-24 NOTE — CARE COORDINATION
Writer spoke to Himanshu with [employer] [facility] regarding referral.  Facility denies due to behaviors and alcohol history. Electronically signed by RIOS Charles on 7/24/2023 at 10:36 AM    Writer spoke to Yanet Gonzalez with [facility]. Facility accepts pt.   Electronically signed by RIOS Charles on 7/24/2023 at 10:50 AM

## 2023-07-24 NOTE — CARE COORDINATION
Transportation arranged for patient to go to [address] [place] [address] at [facility] via [address] [place] [address] Street by stretcher. Facility informed. Bedside nurse Maribel informed @3052. Number for Report: [phone]  Electronically signed by RIOS Mcnally on 7/24/2023 at 2:01 PM    Writer received call from [facility] that [facility] had an issue and will not be able to complete the transport. Judah Winkler transport will  pt @ 65. Writer informed PEBBLES Hitchcock @ 7462.   Electronically signed by RIOS Mcnally on 7/24/2023 at 4:21 PM

## 2023-07-24 NOTE — PLAN OF CARE
Problem: Discharge Planning  Goal: Discharge to home or other facility with appropriate resources  7/24/2023 1324 by Molly Bolden RN  Outcome: Progressing     Problem: Safety - Adult  Goal: Free from fall injury  7/24/2023 1324 by Molly Bolden RN  Outcome: Progressing     Problem: Pain  Goal: Verbalizes/displays adequate comfort level or baseline comfort level  7/24/2023 1324 by Molly Bolden RN  Outcome: Progressing     Problem: Skin/Tissue Integrity  Goal: Absence of new skin breakdown  Description: 1. Monitor for areas of redness and/or skin breakdown  2. Assess vascular access sites hourly  3. Every 4-6 hours minimum:  Change oxygen saturation probe site  4. Every 4-6 hours:  If on nasal continuous positive airway pressure, respiratory therapy assess nares and determine need for appliance change or resting period.   7/24/2023 1324 by Molly Bolden RN  Outcome: Progressing     Problem: ABCDS Injury Assessment  Goal: Absence of physical injury  7/24/2023 1324 by Molly Bolden RN  Outcome: Progressing     Problem: Chronic Conditions and Co-morbidities  Goal: Patient's chronic conditions and co-morbidity symptoms are monitored and maintained or improved  7/24/2023 1324 by Molly Bolden RN  Outcome: Progressing

## 2023-07-24 NOTE — CARE COORDINATION
IMM letter provided to patient. Patient offered four hours to make informed decision regarding appeal process; patient agreeable to discharge.      Electronically signed by RIOS Luong on 7/24/2023 at 4:58 PM

## 2023-07-24 NOTE — CARE COORDINATION
HENS complete.   Document ID : [id number]  Electronically signed by RIOS Felix on 7/24/2023 at 1:15 PM

## 2023-07-28 ENCOUNTER — TELEPHONE (OUTPATIENT)
Dept: ORTHOPEDIC SURGERY | Age: 43
End: 2023-07-28

## 2023-07-28 NOTE — TELEPHONE ENCOUNTER
Spence Meckel pts mom called in stating that pt had L hip Jonah DOS: 7/19/23 done with dr Renay Hodges and pts wound is currently leaking fluid and the facility pt is in has not changed the bandages. Pts mom is requesting a call from clinical staff.  Spence Meckel can be reached at [phone]

## 2023-08-01 ENCOUNTER — TELEPHONE (OUTPATIENT)
Dept: ORTHOPEDIC SURGERY | Age: 43
End: 2023-08-01

## 2023-08-01 NOTE — TELEPHONE ENCOUNTER
DOS 7/19/23    Pre-operative Diagnosis: Left Femoral Neck Fracture     Post-operative Diagnosis: Left Femoral Neck Fracture     Procedure:  Left Hip Hemiarthroplasty     Patient called in stating that she is out of pain medication at her rehab facility and needs medication refilled. Stated that she is having a lot of pain. States that house doctor there will not refill medication.  Please  advise

## 2023-08-02 ENCOUNTER — TELEPHONE (OUTPATIENT)
Dept: ORTHOPEDIC SURGERY | Age: 43
End: 2023-08-02

## 2023-08-02 ENCOUNTER — OFFICE VISIT (OUTPATIENT)
Dept: ORTHOPEDIC SURGERY | Age: 43
End: 2023-08-02

## 2023-08-02 VITALS — HEIGHT: 68 IN | WEIGHT: 261 LBS | BODY MASS INDEX: 39.56 KG/M2

## 2023-08-02 DIAGNOSIS — G89.18 POST-OP PAIN: Primary | ICD-10-CM

## 2023-08-02 DIAGNOSIS — S72.002D CLOSED FRACTURE OF LEFT HIP WITH ROUTINE HEALING, SUBSEQUENT ENCOUNTER: ICD-10-CM

## 2023-08-02 PROCEDURE — 99024 POSTOP FOLLOW-UP VISIT: CPT | Performed by: STUDENT IN AN ORGANIZED HEALTH CARE EDUCATION/TRAINING PROGRAM

## 2023-08-02 RX ORDER — OXYCODONE HYDROCHLORIDE AND ACETAMINOPHEN 5; 325 MG/1; MG/1
1 TABLET ORAL EVERY 4 HOURS PRN
Qty: 42 TABLET | Refills: 0 | Status: SHIPPED | OUTPATIENT
Start: 2023-08-02 | End: 2023-08-09

## 2023-08-02 RX ORDER — ACETAMINOPHEN 500 MG
1000 TABLET ORAL EVERY 8 HOURS PRN
Qty: 60 TABLET | Refills: 0 | Status: SHIPPED | OUTPATIENT
Start: 2023-08-02

## 2023-08-02 NOTE — TELEPHONE ENCOUNTER
Facility called in noting the facility doctor was concerned for [facility] shifting due to the amount of pain pt is consistently in, inquiring if XR was performed at today's visit. I informed facility Xrs were not performed today and the pt did not express the same concerns in office. Asking if XR would be appropriate for this pt. Noted I'd call back with determination.  Please advise

## 2023-08-07 ENCOUNTER — APPOINTMENT (OUTPATIENT)
Dept: GENERAL RADIOLOGY | Age: 43
End: 2023-08-07
Payer: MEDICARE

## 2023-08-07 ENCOUNTER — HOSPITAL ENCOUNTER (EMERGENCY)
Age: 43
Discharge: HOME | End: 2023-08-07
Attending: EMERGENCY MEDICINE
Payer: MEDICARE

## 2023-08-07 ENCOUNTER — APPOINTMENT (OUTPATIENT)
Dept: GENERAL RADIOLOGY | Age: 43
End: 2023-08-07
Attending: EMERGENCY MEDICINE
Payer: MEDICARE

## 2023-08-07 VITALS
OXYGEN SATURATION: 100 % | RESPIRATION RATE: 20 BRPM | HEART RATE: 110 BPM | DIASTOLIC BLOOD PRESSURE: 82 MMHG | BODY MASS INDEX: 40.16 KG/M2 | WEIGHT: 265 LBS | HEIGHT: 68 IN | SYSTOLIC BLOOD PRESSURE: 128 MMHG | TEMPERATURE: 98 F

## 2023-08-07 DIAGNOSIS — Z48.89 ENCOUNTER FOR POSTOPERATIVE WOUND CHECK: Primary | ICD-10-CM

## 2023-08-07 LAB
ALBUMIN SERPL-MCNC: 1.6 G/DL (ref 3.5–5.2)
ALP SERPL-CCNC: 190 U/L (ref 35–104)
ALT SERPL-CCNC: 20 U/L (ref 5–33)
ANION GAP SERPL CALCULATED.3IONS-SCNC: 9 MMOL/L (ref 9–17)
AST SERPL-CCNC: 19 U/L
BASOPHILS # BLD: 0.1 K/UL (ref 0–0.2)
BASOPHILS NFR BLD: 1 % (ref 0–2)
BILIRUB SERPL-MCNC: 0.2 MG/DL (ref 0.3–1.2)
BUN SERPL-MCNC: 8 MG/DL (ref 6–20)
CALCIUM SERPL-MCNC: 7.8 MG/DL (ref 8.6–10.4)
CHLORIDE SERPL-SCNC: 109 MMOL/L (ref 98–107)
CHP ED QC CHECK: YES
CHP ED QC CHECK: YES
CO2 SERPL-SCNC: 22 MMOL/L (ref 20–31)
CREAT SERPL-MCNC: 0.7 MG/DL (ref 0.5–0.9)
CRP SERPL HS-MCNC: 168.2 MG/L (ref 0–5)
EOSINOPHIL # BLD: 0.1 K/UL (ref 0–0.4)
EOSINOPHILS RELATIVE PERCENT: 1 % (ref 0–4)
ERYTHROCYTE [DISTWIDTH] IN BLOOD BY AUTOMATED COUNT: 17.6 % (ref 11.5–14.9)
ERYTHROCYTE [SEDIMENTATION RATE] IN BLOOD BY PHOTOMETRIC METHOD: 11 MM/HR (ref 0–20)
GFR SERPL CREATININE-BSD FRML MDRD: >60 ML/MIN/1.73M2
GLUCOSE BLD-MCNC: 141 MG/DL
GLUCOSE BLD-MCNC: 36 MG/DL
GLUCOSE SERPL-MCNC: 127 MG/DL (ref 70–99)
HCT VFR BLD AUTO: 30.8 % (ref 36–46)
HGB BLD-MCNC: 9.6 G/DL (ref 12–16)
LYMPHOCYTES NFR BLD: 0.7 K/UL (ref 1–4.8)
LYMPHOCYTES RELATIVE PERCENT: 12 % (ref 24–44)
MAGNESIUM SERPL-MCNC: 2.1 MG/DL (ref 1.6–2.6)
MCH RBC QN AUTO: 30.9 PG (ref 26–34)
MCHC RBC AUTO-ENTMCNC: 31.1 G/DL (ref 31–37)
MCV RBC AUTO: 99.5 FL (ref 80–100)
METER GLUCOSE: 141 MG/DL (ref 65–105)
METER GLUCOSE: 160 MG/DL (ref 65–105)
MONOCYTES NFR BLD: 0.5 K/UL (ref 0.1–1.3)
MONOCYTES NFR BLD: 8 % (ref 1–7)
NEUTROPHILS NFR BLD: 78 % (ref 36–66)
NEUTS SEG NFR BLD: 4.7 K/UL (ref 1.3–9.1)
PLATELET # BLD AUTO: 435 K/UL (ref 150–450)
PMV BLD AUTO: 8.4 FL (ref 6–12)
POTASSIUM SERPL-SCNC: 3.9 MMOL/L (ref 3.7–5.3)
PROT SERPL-MCNC: 4.6 G/DL (ref 6.4–8.3)
RBC # BLD AUTO: 3.09 M/UL (ref 4–5.2)
SODIUM SERPL-SCNC: 140 MMOL/L (ref 135–144)
WBC OTHER # BLD: 6 K/UL (ref 3.5–11)

## 2023-08-07 PROCEDURE — 85025 COMPLETE CBC W/AUTO DIFF WBC: CPT

## 2023-08-07 PROCEDURE — 96376 TX/PRO/DX INJ SAME DRUG ADON: CPT

## 2023-08-07 PROCEDURE — 85652 RBC SED RATE AUTOMATED: CPT

## 2023-08-07 PROCEDURE — 96374 THER/PROPH/DIAG INJ IV PUSH: CPT

## 2023-08-07 PROCEDURE — 83735 ASSAY OF MAGNESIUM: CPT

## 2023-08-07 PROCEDURE — 2580000003 HC RX 258: Performed by: EMERGENCY MEDICINE

## 2023-08-07 PROCEDURE — 80053 COMPREHEN METABOLIC PANEL: CPT

## 2023-08-07 PROCEDURE — 82947 ASSAY GLUCOSE BLOOD QUANT: CPT

## 2023-08-07 PROCEDURE — 36415 COLL VENOUS BLD VENIPUNCTURE: CPT

## 2023-08-07 PROCEDURE — 96375 TX/PRO/DX INJ NEW DRUG ADDON: CPT

## 2023-08-07 PROCEDURE — 73502 X-RAY EXAM HIP UNI 2-3 VIEWS: CPT

## 2023-08-07 PROCEDURE — 86140 C-REACTIVE PROTEIN: CPT

## 2023-08-07 PROCEDURE — 99284 EMERGENCY DEPT VISIT MOD MDM: CPT

## 2023-08-07 PROCEDURE — 6360000002 HC RX W HCPCS: Performed by: EMERGENCY MEDICINE

## 2023-08-07 RX ORDER — IBUPROFEN 600 MG/1
600 TABLET ORAL EVERY 8 HOURS PRN
Qty: 15 TABLET | Refills: 0 | Status: SHIPPED | OUTPATIENT
Start: 2023-08-07

## 2023-08-07 RX ORDER — FENTANYL CITRATE 0.05 MG/ML
50 INJECTION, SOLUTION INTRAMUSCULAR; INTRAVENOUS ONCE
Status: COMPLETED | OUTPATIENT
Start: 2023-08-07 | End: 2023-08-07

## 2023-08-07 RX ORDER — MORPHINE SULFATE 4 MG/ML
4 INJECTION, SOLUTION INTRAMUSCULAR; INTRAVENOUS ONCE
Status: COMPLETED | OUTPATIENT
Start: 2023-08-07 | End: 2023-08-07

## 2023-08-07 RX ORDER — DEXTROSE MONOHYDRATE 100 MG/ML
INJECTION, SOLUTION INTRAVENOUS CONTINUOUS PRN
Status: DISCONTINUED | OUTPATIENT
Start: 2023-08-07 | End: 2023-08-07 | Stop reason: HOSPADM

## 2023-08-07 RX ORDER — HYDROCODONE BITARTRATE AND ACETAMINOPHEN 5; 325 MG/1; MG/1
1 TABLET ORAL EVERY 8 HOURS PRN
Qty: 10 TABLET | Refills: 0 | Status: SHIPPED | OUTPATIENT
Start: 2023-08-07 | End: 2023-08-10

## 2023-08-07 RX ORDER — SULFAMETHOXAZOLE AND TRIMETHOPRIM 800; 160 MG/1; MG/1
1 TABLET ORAL 2 TIMES DAILY
Qty: 20 TABLET | Refills: 0 | Status: SHIPPED | OUTPATIENT
Start: 2023-08-07 | End: 2023-08-17

## 2023-08-07 RX ORDER — DEXTROSE MONOHYDRATE 100 MG/ML
INJECTION, SOLUTION INTRAVENOUS
Status: DISCONTINUED
Start: 2023-08-07 | End: 2023-08-07 | Stop reason: HOSPADM

## 2023-08-07 RX ADMIN — FENTANYL CITRATE 50 MCG: 50 INJECTION INTRAMUSCULAR; INTRAVENOUS at 06:23

## 2023-08-07 RX ADMIN — DEXTROSE MONOHYDRATE 250 ML: 100 INJECTION, SOLUTION INTRAVENOUS at 05:52

## 2023-08-07 RX ADMIN — MORPHINE SULFATE 4 MG: 4 INJECTION, SOLUTION INTRAMUSCULAR; INTRAVENOUS at 07:54

## 2023-08-07 RX ADMIN — MORPHINE SULFATE 4 MG: 4 INJECTION, SOLUTION INTRAMUSCULAR; INTRAVENOUS at 10:05

## 2023-08-07 ASSESSMENT — PAIN DESCRIPTION - LOCATION
LOCATION: HIP

## 2023-08-07 ASSESSMENT — ENCOUNTER SYMPTOMS
BACK PAIN: 0
SHORTNESS OF BREATH: 0
ABDOMINAL PAIN: 0
COLOR CHANGE: 0
EYE PAIN: 0

## 2023-08-07 ASSESSMENT — PAIN SCALES - GENERAL
PAINLEVEL_OUTOF10: 8

## 2023-08-07 ASSESSMENT — PAIN DESCRIPTION - ORIENTATION
ORIENTATION: LEFT
ORIENTATION: LEFT

## 2023-08-07 ASSESSMENT — PAIN DESCRIPTION - DESCRIPTORS
DESCRIPTORS: ACHING;THROBBING
DESCRIPTORS: ACHING
DESCRIPTORS: ACHING
DESCRIPTORS: SHARP;ACHING

## 2023-08-07 ASSESSMENT — PAIN - FUNCTIONAL ASSESSMENT: PAIN_FUNCTIONAL_ASSESSMENT: 0-10

## 2023-08-07 ASSESSMENT — PAIN DESCRIPTION - FREQUENCY: FREQUENCY: CONTINUOUS

## 2023-08-07 NOTE — ED NOTES
Unable to get blood work with IV start and Lab in to draw blood.      Pedro De La O RN  08/07/23 1041

## 2023-08-07 NOTE — ED PROVIDER NOTES
Phosphatase 190 (*)     Total Bilirubin 0.2 (*)     Total Protein 4.6 (*)     Albumin 1.6 (*)     All other components within normal limits   C-REACTIVE PROTEIN - Abnormal; Notable for the following components:    .2 (*)     All other components within normal limits   POC GLUCOSE FINGERSTICK - Abnormal; Notable for the following components:    Meter Glucose 141 (*)     All other components within normal limits   POC GLUCOSE FINGERSTICK - Abnormal; Notable for the following components:    Meter Glucose 160 (*)     All other components within normal limits   POCT GLUCOSE - Normal   POCT GLUCOSE - Normal   SEDIMENTATION RATE   MAGNESIUM           Disposition   DISPOSITION:    DISPOSITION    X-ray shows left hip arthroplasty with no evidence of hardware complication and no acute osseous abnormalities. No leukocytosis. Patient has no active drainage and no evidence of dehiscence of the site. I spoke with her orthopedic surgeon Dr. Vero Tinoco who reviewed her chart and recommended placing an Optifoam dressing and placing her on Bactrim. He wanted her to follow-up with him on Monday. Patient requesting pain medication, given a prescription for Norco and Motrin. Shared decision-making was applied with the patient and she is comfortable with this plan. She is stable for discharge at this time. CLINICAL IMPRESSION:  1. Encounter for postoperative wound check        PATIENT REFERRED TO:  No follow-up provider specified. DISCHARGE MEDICATIONS:  New Prescriptions    No medications on file     The care is provided during an unprecedented national emergency due to the novel coronavirus, COVID 19.   DO Lewis  Attending Emergency Physician              Kye Saucedo  08/07/23 6037
Testing twice a day. Please dispense according to patients insurance. LURASIDONE (LATUDA) 60 MG TABS TABLET    Take 1 tablet by mouth Daily with supper    MIDODRINE (PROAMATINE) 2.5 MG TABLET    Take 1 tablet by mouth 2 times daily as needed (for SBP<110)    OXYCODONE-ACETAMINOPHEN (PERCOCET) 5-325 MG PER TABLET    Take 1 tablet by mouth every 4 hours as needed for Pain for up to 7 days. Intended supply: 7 days. Take lowest dose possible to manage pain Max Daily Amount: 6 tablets    PANTOPRAZOLE (PROTONIX) 40 MG TABLET    Take 1 tablet by mouth daily as needed (REFLUX)    PRAVASTATIN (PRAVACHOL) 40 MG TABLET    TAKE 1 TABLET(40 MG) BY MOUTH DAILY    SYRINGE/NEEDLE, DISP, (SYRINGE 3CC/25GX1\") 25G X 1\" 3 ML MISC    To be used with B12 injections monthly    THIAMINE MONONITRATE (THIAMINE) 100 MG TABLET    Take 0.5 tablets by mouth daily    TOPIRAMATE (TOPAMAX) 100 MG TABLET    Take 1 tablet by mouth at bedtime    TRAZODONE (DESYREL) 50 MG TABLET    Take 1 tablet by mouth nightly as needed for Sleep     ALLERGIES     is allergic to aspirin, betadine [povidone iodine], celexa [citalopram hydrobromide], citalopram, furosemide, nitrofurantoin, norco [hydrocodone-acetaminophen], aripiprazole, codeine, fentanyl, lithium, morphine, paroxetine, paxil [paroxetine hcl], povidone iodine, sertraline, tape [adhesive tape], and tegretol [carbamazepine]. FAMILY HISTORY     She indicated that her mother is alive. She indicated that her father is . She indicated that her brother is alive. She indicated that the status of her maternal aunt is unknown. She indicated that the status of her maternal uncle is unknown. She indicated that the status of her maternal cousin is unknown.      SOCIAL HISTORY       Social History     Tobacco Use    Smoking status: Never    Smokeless tobacco: Never   Vaping Use    Vaping Use: Never used   Substance Use Topics    Alcohol use: Not Currently     Alcohol/week: 20.0 standard drinks     Types:

## 2023-08-07 NOTE — ED NOTES
TRANSFER - OUT REPORT:    Verbal report given to Jillian (Nurse from [facility]) on Cele Roman  being transferred to [facility] for routine progression of patient care       Report consisted of patient's Situation, Background, Assessment and   Recommendations(SBAR). Information from the following report(s) Nurse Handoff Report, ED Encounter Summary, ED SBAR, Adult Overview, Intake/Output, MAR, and Recent Results was reviewed with the receiving nurse. [place] Fall Assessment:    Presents to emergency department  because of falls (Syncope, seizure, or loss of consciousness): No  Age > 70: No  Altered Mental Status, Intoxication with alcohol or substance confusion (Disorientation, impaired judgment, poor safety awaremess, or inability to follow instructions): No  Impaired Mobility: Ambulates or transfers with assistive devices or assistance; Unable to ambulate or transer.: Yes             Lines:       Opportunity for questions and clarification was provided.       Patient transported with:  Pauline Crespo RN  08/07/23 5310

## 2023-08-07 NOTE — ED NOTES
Discharge instructions reviewed with patient, noting all directions and education by provider. Patient verbalizes understanding of all information reviewed. Plans reviewed and agreed upon to transport patient via ambulett back to [facility] [phone].      Day Davis RN  08/07/23 8933

## 2023-08-17 RX ORDER — HYDROCODONE BITARTRATE AND ACETAMINOPHEN 5; 325 MG/1; MG/1
1 TABLET ORAL EVERY 6 HOURS PRN
Qty: 28 TABLET | Refills: 0 | Status: CANCELLED | OUTPATIENT
Start: 2023-08-17 | End: 2023-08-24

## 2023-08-17 NOTE — TELEPHONE ENCOUNTER
Patient called to request refill on pain medications to be sent to [facility] on file.

## 2023-08-17 NOTE — TELEPHONE ENCOUNTER
DOS-7/19/23 L hip hemiarthroplasty 4 weeks 6 days out from surgery. Last received [place] on 8/7/23 in ED due to post operative pain, three day supply. Medication pended.

## 2023-08-17 NOTE — TELEPHONE ENCOUNTER
Left voicemail on pts line notifying her of refused prescription provided call back information for further questions or concerns

## 2023-08-20 ENCOUNTER — HOSPITAL ENCOUNTER (INPATIENT)
Age: 43
LOS: 6 days | Discharge: NURSING, REHAB OR LONG-TERM CARE | End: 2023-08-26
Attending: EMERGENCY MEDICINE | Admitting: INTERNAL MEDICINE
Payer: MEDICARE

## 2023-08-20 ENCOUNTER — APPOINTMENT (OUTPATIENT)
Dept: GENERAL RADIOLOGY | Age: 43
End: 2023-08-20
Payer: MEDICARE

## 2023-08-20 DIAGNOSIS — T81.31XS DEHISCENCE OF OPERATIVE WOUND, SEQUELA: ICD-10-CM

## 2023-08-20 DIAGNOSIS — M25.552 LEFT HIP PAIN: ICD-10-CM

## 2023-08-20 DIAGNOSIS — L02.416 ABSCESS OF LEFT HIP: Chronic | ICD-10-CM

## 2023-08-20 DIAGNOSIS — T81.49XA SURGICAL WOUND INFECTION: Primary | ICD-10-CM

## 2023-08-20 LAB
ALBUMIN SERPL-MCNC: 1.6 G/DL (ref 3.5–5.2)
ALBUMIN/GLOB SERPL: 0.5 {RATIO} (ref 1–2.5)
ALP SERPL-CCNC: 149 U/L (ref 35–104)
ALT SERPL-CCNC: 12 U/L (ref 5–33)
ANION GAP SERPL CALCULATED.3IONS-SCNC: 9 MMOL/L (ref 9–17)
AST SERPL-CCNC: 17 U/L
BASOPHILS # BLD: <0.03 K/UL (ref 0–0.2)
BASOPHILS NFR BLD: 0 % (ref 0–2)
BILIRUB SERPL-MCNC: 0.2 MG/DL (ref 0.3–1.2)
BUN SERPL-MCNC: 11 MG/DL (ref 6–20)
CALCIUM SERPL-MCNC: 7.8 MG/DL (ref 8.6–10.4)
CHLORIDE SERPL-SCNC: 104 MMOL/L (ref 98–107)
CO2 SERPL-SCNC: 21 MMOL/L (ref 20–31)
CREAT SERPL-MCNC: 0.6 MG/DL (ref 0.5–0.9)
CRP SERPL HS-MCNC: 37.2 MG/L (ref 0–5)
EOSINOPHIL # BLD: 0.12 K/UL (ref 0–0.44)
EOSINOPHILS RELATIVE PERCENT: 2 % (ref 1–4)
ERYTHROCYTE [DISTWIDTH] IN BLOOD BY AUTOMATED COUNT: 13.7 % (ref 11.8–14.4)
ERYTHROCYTE [SEDIMENTATION RATE] IN BLOOD BY PHOTOMETRIC METHOD: 25 MM/HR (ref 0–20)
GFR SERPL CREATININE-BSD FRML MDRD: >60 ML/MIN/1.73M2
GLUCOSE SERPL-MCNC: 157 MG/DL (ref 70–99)
HCT VFR BLD AUTO: 30 % (ref 36.3–47.1)
HGB BLD-MCNC: 9.3 G/DL (ref 11.9–15.1)
IMM GRANULOCYTES # BLD AUTO: <0.03 K/UL (ref 0–0.3)
IMM GRANULOCYTES NFR BLD: 0 %
LACTIC ACID, SEPSIS WHOLE BLOOD: 1.1 MMOL/L (ref 0.5–1.9)
LYMPHOCYTES NFR BLD: 1.37 K/UL (ref 1.1–3.7)
LYMPHOCYTES RELATIVE PERCENT: 21 % (ref 24–43)
MCH RBC QN AUTO: 32.2 PG (ref 25.2–33.5)
MCHC RBC AUTO-ENTMCNC: 31 G/DL (ref 28.4–34.8)
MCV RBC AUTO: 103.8 FL (ref 82.6–102.9)
MONOCYTES NFR BLD: 0.51 K/UL (ref 0.1–1.2)
MONOCYTES NFR BLD: 8 % (ref 3–12)
NEUTROPHILS NFR BLD: 69 % (ref 36–65)
NEUTS SEG NFR BLD: 4.59 K/UL (ref 1.5–8.1)
NRBC BLD-RTO: 0 PER 100 WBC
PLATELET # BLD AUTO: 566 K/UL (ref 138–453)
PMV BLD AUTO: 10.2 FL (ref 8.1–13.5)
POTASSIUM SERPL-SCNC: 4.3 MMOL/L (ref 3.7–5.3)
PROT SERPL-MCNC: 5.1 G/DL (ref 6.4–8.3)
RBC # BLD AUTO: 2.89 M/UL (ref 3.95–5.11)
RBC # BLD: ABNORMAL 10*6/UL
SODIUM SERPL-SCNC: 134 MMOL/L (ref 135–144)
WBC OTHER # BLD: 6.6 K/UL (ref 3.5–11.3)

## 2023-08-20 PROCEDURE — 85025 COMPLETE CBC W/AUTO DIFF WBC: CPT

## 2023-08-20 PROCEDURE — 87205 SMEAR GRAM STAIN: CPT

## 2023-08-20 PROCEDURE — 6360000002 HC RX W HCPCS

## 2023-08-20 PROCEDURE — 87075 CULTR BACTERIA EXCEPT BLOOD: CPT

## 2023-08-20 PROCEDURE — 96372 THER/PROPH/DIAG INJ SC/IM: CPT

## 2023-08-20 PROCEDURE — 86403 PARTICLE AGGLUT ANTBDY SCRN: CPT

## 2023-08-20 PROCEDURE — 96374 THER/PROPH/DIAG INJ IV PUSH: CPT

## 2023-08-20 PROCEDURE — 87077 CULTURE AEROBIC IDENTIFY: CPT

## 2023-08-20 PROCEDURE — 99285 EMERGENCY DEPT VISIT HI MDM: CPT

## 2023-08-20 PROCEDURE — 87040 BLOOD CULTURE FOR BACTERIA: CPT

## 2023-08-20 PROCEDURE — 93005 ELECTROCARDIOGRAM TRACING: CPT

## 2023-08-20 PROCEDURE — 2580000003 HC RX 258

## 2023-08-20 PROCEDURE — 73502 X-RAY EXAM HIP UNI 2-3 VIEWS: CPT

## 2023-08-20 PROCEDURE — 87076 CULTURE ANAEROBE IDENT EACH: CPT

## 2023-08-20 PROCEDURE — 87185 SC STD ENZYME DETCJ PER NZM: CPT

## 2023-08-20 PROCEDURE — 1200000000 HC SEMI PRIVATE

## 2023-08-20 PROCEDURE — 86140 C-REACTIVE PROTEIN: CPT

## 2023-08-20 PROCEDURE — 85652 RBC SED RATE AUTOMATED: CPT

## 2023-08-20 PROCEDURE — 83605 ASSAY OF LACTIC ACID: CPT

## 2023-08-20 PROCEDURE — 87070 CULTURE OTHR SPECIMN AEROBIC: CPT

## 2023-08-20 PROCEDURE — 36415 COLL VENOUS BLD VENIPUNCTURE: CPT

## 2023-08-20 PROCEDURE — 80053 COMPREHEN METABOLIC PANEL: CPT

## 2023-08-20 PROCEDURE — 99223 1ST HOSP IP/OBS HIGH 75: CPT | Performed by: INTERNAL MEDICINE

## 2023-08-20 PROCEDURE — 6360000002 HC RX W HCPCS: Performed by: EMERGENCY MEDICINE

## 2023-08-20 PROCEDURE — 87186 SC STD MICRODIL/AGAR DIL: CPT

## 2023-08-20 RX ORDER — 0.9 % SODIUM CHLORIDE 0.9 %
1000 INTRAVENOUS SOLUTION INTRAVENOUS ONCE
Status: COMPLETED | OUTPATIENT
Start: 2023-08-20 | End: 2023-08-20

## 2023-08-20 RX ORDER — MORPHINE SULFATE 4 MG/ML
4 INJECTION INTRAVENOUS ONCE
Status: COMPLETED | OUTPATIENT
Start: 2023-08-20 | End: 2023-08-20

## 2023-08-20 RX ADMIN — MORPHINE SULFATE 4 MG: 4 INJECTION INTRAVENOUS at 21:31

## 2023-08-20 RX ADMIN — SODIUM CHLORIDE 1000 ML: 9 INJECTION, SOLUTION INTRAVENOUS at 21:32

## 2023-08-20 RX ADMIN — VANCOMYCIN HYDROCHLORIDE 1000 MG: 1 INJECTION, POWDER, LYOPHILIZED, FOR SOLUTION INTRAVENOUS at 22:00

## 2023-08-20 RX ADMIN — MORPHINE SULFATE 4 MG: 4 INJECTION INTRAVENOUS at 19:40

## 2023-08-20 ASSESSMENT — PAIN DESCRIPTION - FREQUENCY: FREQUENCY: CONTINUOUS

## 2023-08-20 ASSESSMENT — ENCOUNTER SYMPTOMS
ABDOMINAL PAIN: 0
SHORTNESS OF BREATH: 0

## 2023-08-20 ASSESSMENT — PAIN DESCRIPTION - ORIENTATION: ORIENTATION: LEFT

## 2023-08-20 ASSESSMENT — PAIN SCALES - GENERAL
PAINLEVEL_OUTOF10: 8

## 2023-08-20 ASSESSMENT — PAIN DESCRIPTION - DESCRIPTORS: DESCRIPTORS: SHARP

## 2023-08-20 ASSESSMENT — PAIN - FUNCTIONAL ASSESSMENT: PAIN_FUNCTIONAL_ASSESSMENT: 0-10

## 2023-08-20 ASSESSMENT — PAIN DESCRIPTION - LOCATION: LOCATION: HIP

## 2023-08-20 NOTE — ED NOTES
The following labs were labeled with appropriate pt sticker and tubed to lab:     [] Blue     [] Lavender   [] on ice  [] Green/yellow  [] Green/black [] on ice  [] Francena Achilles  [] on ice  [] Yellow  [] Red  [] Type/ Screen  [] ABG  [] VBG    [] COVID-19 swab    [] Rapid  [] PCR  [] Flu swab  [] Peds Viral Panel     [] Urine Sample  [] Fecal Sample  [] Pelvic Cultures  [x] Blood Cultures  [x] X 1  [] STREP Cultures       Nacho Kendall RN  08/20/23 1928

## 2023-08-21 ENCOUNTER — ANESTHESIA EVENT (OUTPATIENT)
Dept: OPERATING ROOM | Age: 43
End: 2023-08-21
Payer: MEDICARE

## 2023-08-21 ENCOUNTER — ANESTHESIA (OUTPATIENT)
Dept: OPERATING ROOM | Age: 43
End: 2023-08-21
Payer: MEDICARE

## 2023-08-21 LAB
ABO + RH BLD: NORMAL
ANION GAP SERPL CALCULATED.3IONS-SCNC: 10 MMOL/L (ref 9–17)
ARM BAND NUMBER: NORMAL
BASOPHILS # BLD: 0.07 K/UL (ref 0–0.2)
BASOPHILS NFR BLD: 1 % (ref 0–2)
BLOOD BANK SAMPLE EXPIRATION: NORMAL
BLOOD GROUP ANTIBODIES SERPL: NEGATIVE
BUN SERPL-MCNC: 9 MG/DL (ref 6–20)
CALCIUM SERPL-MCNC: 7.9 MG/DL (ref 8.6–10.4)
CHLORIDE SERPL-SCNC: 110 MMOL/L (ref 98–107)
CO2 SERPL-SCNC: 17 MMOL/L (ref 20–31)
CREAT SERPL-MCNC: 0.5 MG/DL (ref 0.5–0.9)
EOSINOPHIL # BLD: 0.14 K/UL (ref 0–0.44)
EOSINOPHILS RELATIVE PERCENT: 2 % (ref 1–4)
ERYTHROCYTE [DISTWIDTH] IN BLOOD BY AUTOMATED COUNT: 13.8 % (ref 11.8–14.4)
GFR SERPL CREATININE-BSD FRML MDRD: >60 ML/MIN/1.73M2
GLUCOSE BLD-MCNC: 149 MG/DL (ref 65–105)
GLUCOSE BLD-MCNC: 49 MG/DL (ref 65–105)
GLUCOSE BLD-MCNC: 51 MG/DL (ref 65–105)
GLUCOSE BLD-MCNC: 61 MG/DL (ref 65–105)
GLUCOSE BLD-MCNC: 90 MG/DL (ref 65–105)
GLUCOSE BLD-MCNC: 92 MG/DL (ref 65–105)
GLUCOSE BLD-MCNC: 97 MG/DL (ref 65–105)
GLUCOSE BLD-MCNC: 98 MG/DL (ref 65–105)
GLUCOSE SERPL-MCNC: 114 MG/DL (ref 70–99)
HCT VFR BLD AUTO: 32.8 % (ref 36.3–47.1)
HGB BLD-MCNC: 9.4 G/DL (ref 11.9–15.1)
IMM GRANULOCYTES # BLD AUTO: 0.07 K/UL (ref 0–0.3)
IMM GRANULOCYTES NFR BLD: 1 %
INR PPP: 1
LACTIC ACID, WHOLE BLOOD: 2.2 MMOL/L (ref 0.7–2.1)
LYMPHOCYTES NFR BLD: 1.31 K/UL (ref 1.1–3.7)
LYMPHOCYTES RELATIVE PERCENT: 19 % (ref 24–43)
MCH RBC QN AUTO: 31.6 PG (ref 25.2–33.5)
MCHC RBC AUTO-ENTMCNC: 28.7 G/DL (ref 28.4–34.8)
MCV RBC AUTO: 110.4 FL (ref 82.6–102.9)
MONOCYTES NFR BLD: 0.69 K/UL (ref 0.1–1.2)
MONOCYTES NFR BLD: 10 % (ref 3–12)
MORPHOLOGY: ABNORMAL
NEUTROPHILS NFR BLD: 67 % (ref 36–65)
NEUTS SEG NFR BLD: 4.62 K/UL (ref 1.5–8.1)
NRBC BLD-RTO: 0 PER 100 WBC
PARTIAL THROMBOPLASTIN TIME: 27.2 SEC (ref 23–36.5)
PLATELET # BLD AUTO: 555 K/UL (ref 138–453)
PMV BLD AUTO: 10 FL (ref 8.1–13.5)
POTASSIUM SERPL-SCNC: 3.9 MMOL/L (ref 3.7–5.3)
PROTHROMBIN TIME: 12.7 SEC (ref 11.7–14.9)
RBC # BLD AUTO: 2.97 M/UL (ref 3.95–5.11)
REASON FOR REJECTION: NORMAL
REASON FOR REJECTION: NORMAL
SODIUM SERPL-SCNC: 137 MMOL/L (ref 135–144)
SPECIMEN SOURCE: NORMAL
SPECIMEN SOURCE: NORMAL
WBC OTHER # BLD: 6.9 K/UL (ref 3.5–11.3)
ZZ NTE CLEAN UP: ORDERED TEST: NORMAL
ZZ NTE CLEAN UP: ORDERED TEST: NORMAL

## 2023-08-21 PROCEDURE — 6360000002 HC RX W HCPCS

## 2023-08-21 PROCEDURE — 6370000000 HC RX 637 (ALT 250 FOR IP)

## 2023-08-21 PROCEDURE — 1200000000 HC SEMI PRIVATE

## 2023-08-21 PROCEDURE — 3600000004 HC SURGERY LEVEL 4 BASE: Performed by: STUDENT IN AN ORGANIZED HEALTH CARE EDUCATION/TRAINING PROGRAM

## 2023-08-21 PROCEDURE — 0KBP0ZZ EXCISION OF LEFT HIP MUSCLE, OPEN APPROACH: ICD-10-PCS | Performed by: STUDENT IN AN ORGANIZED HEALTH CARE EDUCATION/TRAINING PROGRAM

## 2023-08-21 PROCEDURE — 2580000003 HC RX 258

## 2023-08-21 PROCEDURE — 97535 SELF CARE MNGMENT TRAINING: CPT

## 2023-08-21 PROCEDURE — 3600000014 HC SURGERY LEVEL 4 ADDTL 15MIN: Performed by: STUDENT IN AN ORGANIZED HEALTH CARE EDUCATION/TRAINING PROGRAM

## 2023-08-21 PROCEDURE — 2580000003 HC RX 258: Performed by: ORTHOPAEDIC SURGERY

## 2023-08-21 PROCEDURE — C9399 UNCLASSIFIED DRUGS OR BIOLOG: HCPCS

## 2023-08-21 PROCEDURE — 86140 C-REACTIVE PROTEIN: CPT

## 2023-08-21 PROCEDURE — 2500000003 HC RX 250 WO HCPCS

## 2023-08-21 PROCEDURE — 99232 SBSQ HOSP IP/OBS MODERATE 35: CPT | Performed by: INTERNAL MEDICINE

## 2023-08-21 PROCEDURE — 80048 BASIC METABOLIC PNL TOTAL CA: CPT

## 2023-08-21 PROCEDURE — 97162 PT EVAL MOD COMPLEX 30 MIN: CPT

## 2023-08-21 PROCEDURE — 7100000001 HC PACU RECOVERY - ADDTL 15 MIN: Performed by: STUDENT IN AN ORGANIZED HEALTH CARE EDUCATION/TRAINING PROGRAM

## 2023-08-21 PROCEDURE — 86901 BLOOD TYPING SEROLOGIC RH(D): CPT

## 2023-08-21 PROCEDURE — 2709999900 HC NON-CHARGEABLE SUPPLY: Performed by: STUDENT IN AN ORGANIZED HEALTH CARE EDUCATION/TRAINING PROGRAM

## 2023-08-21 PROCEDURE — 3700000001 HC ADD 15 MINUTES (ANESTHESIA): Performed by: STUDENT IN AN ORGANIZED HEALTH CARE EDUCATION/TRAINING PROGRAM

## 2023-08-21 PROCEDURE — 97166 OT EVAL MOD COMPLEX 45 MIN: CPT

## 2023-08-21 PROCEDURE — 2580000003 HC RX 258: Performed by: INTERNAL MEDICINE

## 2023-08-21 PROCEDURE — 86900 BLOOD TYPING SEROLOGIC ABO: CPT

## 2023-08-21 PROCEDURE — 3E02329 INTRODUCTION OF OTHER ANTI-INFECTIVE INTO MUSCLE, PERCUTANEOUS APPROACH: ICD-10-PCS | Performed by: STUDENT IN AN ORGANIZED HEALTH CARE EDUCATION/TRAINING PROGRAM

## 2023-08-21 PROCEDURE — 85730 THROMBOPLASTIN TIME PARTIAL: CPT

## 2023-08-21 PROCEDURE — 6370000000 HC RX 637 (ALT 250 FOR IP): Performed by: ORTHOPAEDIC SURGERY

## 2023-08-21 PROCEDURE — 6360000002 HC RX W HCPCS: Performed by: STUDENT IN AN ORGANIZED HEALTH CARE EDUCATION/TRAINING PROGRAM

## 2023-08-21 PROCEDURE — 82947 ASSAY GLUCOSE BLOOD QUANT: CPT

## 2023-08-21 PROCEDURE — 7100000000 HC PACU RECOVERY - FIRST 15 MIN: Performed by: STUDENT IN AN ORGANIZED HEALTH CARE EDUCATION/TRAINING PROGRAM

## 2023-08-21 PROCEDURE — 83605 ASSAY OF LACTIC ACID: CPT

## 2023-08-21 PROCEDURE — 20700 MNL PREP&INSJ DP RX DLVR DEV: CPT | Performed by: STUDENT IN AN ORGANIZED HEALTH CARE EDUCATION/TRAINING PROGRAM

## 2023-08-21 PROCEDURE — 13160 SEC CLSR SURG WND/DEHSN XTN: CPT | Performed by: STUDENT IN AN ORGANIZED HEALTH CARE EDUCATION/TRAINING PROGRAM

## 2023-08-21 PROCEDURE — 6360000002 HC RX W HCPCS: Performed by: ORTHOPAEDIC SURGERY

## 2023-08-21 PROCEDURE — 86850 RBC ANTIBODY SCREEN: CPT

## 2023-08-21 PROCEDURE — 3700000000 HC ANESTHESIA ATTENDED CARE: Performed by: STUDENT IN AN ORGANIZED HEALTH CARE EDUCATION/TRAINING PROGRAM

## 2023-08-21 PROCEDURE — 97530 THERAPEUTIC ACTIVITIES: CPT

## 2023-08-21 PROCEDURE — 2580000003 HC RX 258: Performed by: STUDENT IN AN ORGANIZED HEALTH CARE EDUCATION/TRAINING PROGRAM

## 2023-08-21 PROCEDURE — 6360000002 HC RX W HCPCS: Performed by: ANESTHESIOLOGY

## 2023-08-21 PROCEDURE — 85610 PROTHROMBIN TIME: CPT

## 2023-08-21 PROCEDURE — 97605 NEG PRS WND THER DME<=50SQCM: CPT | Performed by: STUDENT IN AN ORGANIZED HEALTH CARE EDUCATION/TRAINING PROGRAM

## 2023-08-21 PROCEDURE — 85025 COMPLETE CBC W/AUTO DIFF WBC: CPT

## 2023-08-21 PROCEDURE — 11043 DBRDMT MUSC&/FSCA 1ST 20/<: CPT | Performed by: STUDENT IN AN ORGANIZED HEALTH CARE EDUCATION/TRAINING PROGRAM

## 2023-08-21 PROCEDURE — C1713 ANCHOR/SCREW BN/BN,TIS/BN: HCPCS | Performed by: STUDENT IN AN ORGANIZED HEALTH CARE EDUCATION/TRAINING PROGRAM

## 2023-08-21 PROCEDURE — 36415 COLL VENOUS BLD VENIPUNCTURE: CPT

## 2023-08-21 PROCEDURE — 99222 1ST HOSP IP/OBS MODERATE 55: CPT | Performed by: INTERNAL MEDICINE

## 2023-08-21 PROCEDURE — 6360000002 HC RX W HCPCS: Performed by: INTERNAL MEDICINE

## 2023-08-21 PROCEDURE — 11046 DBRDMT MUSC&/FSCA EA ADDL: CPT | Performed by: STUDENT IN AN ORGANIZED HEALTH CARE EDUCATION/TRAINING PROGRAM

## 2023-08-21 DEVICE — STIMULAN® RAPID CURE PROVIDED STERILE FOR SINGLE PATIENT USE. STIMULAN® RAPID CURE CONTAINS CALCIUM SULFATE POWDER AND MIXING SOLUTION IN PRE-MEASURED QUANTITIES SO THAT WHEN MIXED TOGETHER IN A STERILE MIXING BOWL, THE RESULTANT PASTE IS TO BE DIGITALLY PACKED INTO OPEN BONE VOID/GAP TO SET INSITU OR PLACED INTO THE MOULD PROVIDED, THE MIXTURE SETS TO FORM BEADS. THE BIODEGRADABLE, RADIOPAQUE BEADS ARE RESORBED IN APPROXIMATELY 30 – 60 DAYS WHEN USED IN ACCORDANCE WITH THE DEVICE LABELLING. STIMULAN® RAPID CURE IS MANUFACTURED FROM SYNTHETIC IMPLANT GRADE CALCIUM SULFATE DIHYDRATE(CASO4.2H2O) THAT RESORBS AND IS REPLACED WITH BONE DURING THE HEALING PROCESS. ALSO, AS THE BONE VOID FILLER BEADS ARE BIODEGRADABLE AND BIOCOMPATIBLE, THEY MAY BE USED AT AN INFECTED SITE.
Type: IMPLANTABLE DEVICE | Site: HIP | Status: FUNCTIONAL
Brand: STIMULAN® RAPID CURE

## 2023-08-21 RX ORDER — PRAVASTATIN SODIUM 20 MG
40 TABLET ORAL NIGHTLY
Status: DISCONTINUED | OUTPATIENT
Start: 2023-08-21 | End: 2023-08-26 | Stop reason: HOSPADM

## 2023-08-21 RX ORDER — MORPHINE SULFATE 2 MG/ML
INJECTION, SOLUTION INTRAMUSCULAR; INTRAVENOUS
Status: COMPLETED
Start: 2023-08-21 | End: 2023-08-21

## 2023-08-21 RX ORDER — INSULIN LISPRO 100 [IU]/ML
0-8 INJECTION, SOLUTION INTRAVENOUS; SUBCUTANEOUS
Status: DISCONTINUED | OUTPATIENT
Start: 2023-08-21 | End: 2023-08-26 | Stop reason: HOSPADM

## 2023-08-21 RX ORDER — INSULIN LISPRO 100 [IU]/ML
0-4 INJECTION, SOLUTION INTRAVENOUS; SUBCUTANEOUS NIGHTLY
Status: DISCONTINUED | OUTPATIENT
Start: 2023-08-21 | End: 2023-08-26 | Stop reason: HOSPADM

## 2023-08-21 RX ORDER — MIDAZOLAM HYDROCHLORIDE 1 MG/ML
INJECTION INTRAMUSCULAR; INTRAVENOUS PRN
Status: DISCONTINUED | OUTPATIENT
Start: 2023-08-21 | End: 2023-08-21 | Stop reason: SDUPTHER

## 2023-08-21 RX ORDER — MORPHINE SULFATE 2 MG/ML
2 INJECTION, SOLUTION INTRAMUSCULAR; INTRAVENOUS ONCE
Status: COMPLETED | OUTPATIENT
Start: 2023-08-21 | End: 2023-08-21

## 2023-08-21 RX ORDER — MORPHINE SULFATE 2 MG/ML
2 INJECTION, SOLUTION INTRAMUSCULAR; INTRAVENOUS EVERY 4 HOURS PRN
Status: DISCONTINUED | OUTPATIENT
Start: 2023-08-21 | End: 2023-08-26 | Stop reason: HOSPADM

## 2023-08-21 RX ORDER — SODIUM CHLORIDE 0.9 % (FLUSH) 0.9 %
5-40 SYRINGE (ML) INJECTION EVERY 12 HOURS SCHEDULED
Status: DISCONTINUED | OUTPATIENT
Start: 2023-08-21 | End: 2023-08-26 | Stop reason: HOSPADM

## 2023-08-21 RX ORDER — FLUOXETINE HYDROCHLORIDE 20 MG/1
20 CAPSULE ORAL DAILY
Status: ON HOLD | COMMUNITY
Start: 2023-06-02 | End: 2023-08-25 | Stop reason: HOSPADM

## 2023-08-21 RX ORDER — LIDOCAINE HYDROCHLORIDE 10 MG/ML
INJECTION, SOLUTION EPIDURAL; INFILTRATION; INTRACAUDAL; PERINEURAL PRN
Status: DISCONTINUED | OUTPATIENT
Start: 2023-08-21 | End: 2023-08-21 | Stop reason: SDUPTHER

## 2023-08-21 RX ORDER — SODIUM CHLORIDE 9 MG/ML
INJECTION, SOLUTION INTRAVENOUS PRN
Status: DISCONTINUED | OUTPATIENT
Start: 2023-08-21 | End: 2023-08-21 | Stop reason: HOSPADM

## 2023-08-21 RX ORDER — SODIUM CHLORIDE 9 MG/ML
INJECTION, SOLUTION INTRAVENOUS CONTINUOUS
Status: DISCONTINUED | OUTPATIENT
Start: 2023-08-21 | End: 2023-08-26 | Stop reason: HOSPADM

## 2023-08-21 RX ORDER — MAGNESIUM HYDROXIDE 1200 MG/15ML
LIQUID ORAL CONTINUOUS PRN
Status: DISCONTINUED | OUTPATIENT
Start: 2023-08-21 | End: 2023-08-21 | Stop reason: HOSPADM

## 2023-08-21 RX ORDER — SODIUM CHLORIDE 0.9 % (FLUSH) 0.9 %
5-40 SYRINGE (ML) INJECTION PRN
Status: DISCONTINUED | OUTPATIENT
Start: 2023-08-21 | End: 2023-08-26 | Stop reason: HOSPADM

## 2023-08-21 RX ORDER — MIDODRINE HYDROCHLORIDE 2.5 MG/1
2.5 TABLET ORAL 2 TIMES DAILY PRN
Status: DISCONTINUED | OUTPATIENT
Start: 2023-08-21 | End: 2023-08-26 | Stop reason: HOSPADM

## 2023-08-21 RX ORDER — LOSARTAN POTASSIUM 50 MG/1
TABLET ORAL
COMMUNITY
Start: 2023-06-29

## 2023-08-21 RX ORDER — DEXAMETHASONE SODIUM PHOSPHATE 10 MG/ML
INJECTION INTRAMUSCULAR; INTRAVENOUS PRN
Status: DISCONTINUED | OUTPATIENT
Start: 2023-08-21 | End: 2023-08-21 | Stop reason: SDUPTHER

## 2023-08-21 RX ORDER — SODIUM CHLORIDE 0.9 % (FLUSH) 0.9 %
5-40 SYRINGE (ML) INJECTION EVERY 12 HOURS SCHEDULED
Status: DISCONTINUED | OUTPATIENT
Start: 2023-08-21 | End: 2023-08-21 | Stop reason: HOSPADM

## 2023-08-21 RX ORDER — ERGOCALCIFEROL 1.25 MG/1
1 CAPSULE ORAL
Status: ON HOLD | COMMUNITY
Start: 2016-09-26 | End: 2023-08-25 | Stop reason: HOSPADM

## 2023-08-21 RX ORDER — DEXTROSE MONOHYDRATE 100 MG/ML
INJECTION, SOLUTION INTRAVENOUS CONTINUOUS PRN
Status: DISCONTINUED | OUTPATIENT
Start: 2023-08-21 | End: 2023-08-26 | Stop reason: HOSPADM

## 2023-08-21 RX ORDER — SODIUM CHLORIDE 0.9 % (FLUSH) 0.9 %
5-40 SYRINGE (ML) INJECTION PRN
Status: DISCONTINUED | OUTPATIENT
Start: 2023-08-21 | End: 2023-08-21 | Stop reason: HOSPADM

## 2023-08-21 RX ORDER — ACETAMINOPHEN 325 MG/1
650 TABLET ORAL EVERY 6 HOURS PRN
Status: DISCONTINUED | OUTPATIENT
Start: 2023-08-21 | End: 2023-08-26 | Stop reason: HOSPADM

## 2023-08-21 RX ORDER — DILTIAZEM HYDROCHLORIDE 360 MG/1
CAPSULE, EXTENDED RELEASE ORAL
Status: ON HOLD | COMMUNITY
Start: 2023-06-29 | End: 2023-08-25 | Stop reason: HOSPADM

## 2023-08-21 RX ORDER — GENTAMICIN SULFATE 40 MG/ML
INJECTION, SOLUTION INTRAMUSCULAR; INTRAVENOUS PRN
Status: DISCONTINUED | OUTPATIENT
Start: 2023-08-21 | End: 2023-08-21 | Stop reason: HOSPADM

## 2023-08-21 RX ORDER — SODIUM CHLORIDE 9 MG/ML
INJECTION, SOLUTION INTRAVENOUS CONTINUOUS PRN
Status: DISCONTINUED | OUTPATIENT
Start: 2023-08-21 | End: 2023-08-21 | Stop reason: SDUPTHER

## 2023-08-21 RX ORDER — FENTANYL CITRATE 50 UG/ML
INJECTION, SOLUTION INTRAMUSCULAR; INTRAVENOUS PRN
Status: DISCONTINUED | OUTPATIENT
Start: 2023-08-21 | End: 2023-08-21 | Stop reason: SDUPTHER

## 2023-08-21 RX ORDER — BUPROPION HYDROCHLORIDE 300 MG/1
300 TABLET ORAL EVERY MORNING
Status: ON HOLD | COMMUNITY
Start: 2023-05-30 | End: 2023-08-25 | Stop reason: HOSPADM

## 2023-08-21 RX ORDER — VANCOMYCIN HYDROCHLORIDE 1 G/20ML
INJECTION, POWDER, LYOPHILIZED, FOR SOLUTION INTRAVENOUS PRN
Status: DISCONTINUED | OUTPATIENT
Start: 2023-08-21 | End: 2023-08-21 | Stop reason: HOSPADM

## 2023-08-21 RX ORDER — VALACYCLOVIR HYDROCHLORIDE 500 MG/1
500 TABLET, FILM COATED ORAL DAILY
COMMUNITY
Start: 2023-06-02

## 2023-08-21 RX ORDER — FONDAPARINUX SODIUM 5 MG/.4ML
10 INJECTION SUBCUTANEOUS DAILY
Status: DISCONTINUED | OUTPATIENT
Start: 2023-08-21 | End: 2023-08-26 | Stop reason: HOSPADM

## 2023-08-21 RX ORDER — ONDANSETRON 2 MG/ML
INJECTION INTRAMUSCULAR; INTRAVENOUS PRN
Status: DISCONTINUED | OUTPATIENT
Start: 2023-08-21 | End: 2023-08-21 | Stop reason: SDUPTHER

## 2023-08-21 RX ORDER — MORPHINE SULFATE 2 MG/ML
1 INJECTION, SOLUTION INTRAMUSCULAR; INTRAVENOUS EVERY 4 HOURS PRN
Status: DISCONTINUED | OUTPATIENT
Start: 2023-08-21 | End: 2023-08-21

## 2023-08-21 RX ORDER — ROCURONIUM BROMIDE 10 MG/ML
INJECTION, SOLUTION INTRAVENOUS PRN
Status: DISCONTINUED | OUTPATIENT
Start: 2023-08-21 | End: 2023-08-21 | Stop reason: SDUPTHER

## 2023-08-21 RX ORDER — SODIUM CHLORIDE, SODIUM LACTATE, POTASSIUM CHLORIDE, CALCIUM CHLORIDE 600; 310; 30; 20 MG/100ML; MG/100ML; MG/100ML; MG/100ML
INJECTION, SOLUTION INTRAVENOUS CONTINUOUS PRN
Status: DISCONTINUED | OUTPATIENT
Start: 2023-08-21 | End: 2023-08-21 | Stop reason: SDUPTHER

## 2023-08-21 RX ORDER — FLUOXETINE HYDROCHLORIDE 20 MG/1
40 CAPSULE ORAL DAILY
Status: DISCONTINUED | OUTPATIENT
Start: 2023-08-21 | End: 2023-08-26 | Stop reason: HOSPADM

## 2023-08-21 RX ORDER — ACETAMINOPHEN 650 MG/1
650 SUPPOSITORY RECTAL EVERY 6 HOURS PRN
Status: DISCONTINUED | OUTPATIENT
Start: 2023-08-21 | End: 2023-08-26 | Stop reason: HOSPADM

## 2023-08-21 RX ORDER — INSULIN GLARGINE 300 U/ML
35 INJECTION, SOLUTION SUBCUTANEOUS DAILY
Status: ON HOLD | COMMUNITY
Start: 2023-06-02 | End: 2023-08-25 | Stop reason: HOSPADM

## 2023-08-21 RX ORDER — CYCLOBENZAPRINE HCL 10 MG
TABLET ORAL
Status: ON HOLD | COMMUNITY
Start: 2023-01-30 | End: 2023-08-25 | Stop reason: HOSPADM

## 2023-08-21 RX ORDER — SODIUM CHLORIDE 9 MG/ML
INJECTION, SOLUTION INTRAVENOUS PRN
Status: DISCONTINUED | OUTPATIENT
Start: 2023-08-21 | End: 2023-08-26 | Stop reason: HOSPADM

## 2023-08-21 RX ORDER — PANTOPRAZOLE SODIUM 40 MG/1
40 TABLET, DELAYED RELEASE ORAL DAILY PRN
Status: DISCONTINUED | OUTPATIENT
Start: 2023-08-21 | End: 2023-08-26 | Stop reason: HOSPADM

## 2023-08-21 RX ORDER — POLYETHYLENE GLYCOL 3350 17 G/17G
17 POWDER, FOR SOLUTION ORAL DAILY PRN
Status: DISCONTINUED | OUTPATIENT
Start: 2023-08-21 | End: 2023-08-26 | Stop reason: HOSPADM

## 2023-08-21 RX ORDER — ONDANSETRON 2 MG/ML
4 INJECTION INTRAMUSCULAR; INTRAVENOUS EVERY 6 HOURS PRN
Status: DISCONTINUED | OUTPATIENT
Start: 2023-08-21 | End: 2023-08-26 | Stop reason: HOSPADM

## 2023-08-21 RX ORDER — MIDAZOLAM HYDROCHLORIDE 2 MG/2ML
2 INJECTION, SOLUTION INTRAMUSCULAR; INTRAVENOUS ONCE
Status: COMPLETED | OUTPATIENT
Start: 2023-08-21 | End: 2023-08-21

## 2023-08-21 RX ORDER — PROPOFOL 10 MG/ML
INJECTION, EMULSION INTRAVENOUS PRN
Status: DISCONTINUED | OUTPATIENT
Start: 2023-08-21 | End: 2023-08-21 | Stop reason: SDUPTHER

## 2023-08-21 RX ORDER — ONDANSETRON 4 MG/1
4 TABLET, ORALLY DISINTEGRATING ORAL EVERY 8 HOURS PRN
Status: DISCONTINUED | OUTPATIENT
Start: 2023-08-21 | End: 2023-08-26 | Stop reason: HOSPADM

## 2023-08-21 RX ORDER — FENTANYL CITRATE 50 UG/ML
25 INJECTION, SOLUTION INTRAMUSCULAR; INTRAVENOUS ONCE
Status: COMPLETED | OUTPATIENT
Start: 2023-08-21 | End: 2023-08-21

## 2023-08-21 RX ADMIN — FENTANYL CITRATE 100 MCG: 50 INJECTION, SOLUTION INTRAMUSCULAR; INTRAVENOUS at 13:35

## 2023-08-21 RX ADMIN — ONDANSETRON 4 MG: 2 INJECTION INTRAMUSCULAR; INTRAVENOUS at 15:56

## 2023-08-21 RX ADMIN — SODIUM CHLORIDE, PRESERVATIVE FREE 10 ML: 5 INJECTION INTRAVENOUS at 09:25

## 2023-08-21 RX ADMIN — DEXTROSE MONOHYDRATE 125 ML: 100 INJECTION, SOLUTION INTRAVENOUS at 12:09

## 2023-08-21 RX ADMIN — FENTANYL CITRATE 50 MCG: 50 INJECTION, SOLUTION INTRAMUSCULAR; INTRAVENOUS at 15:54

## 2023-08-21 RX ADMIN — MIDAZOLAM 2 MG: 1 INJECTION INTRAMUSCULAR; INTRAVENOUS at 13:30

## 2023-08-21 RX ADMIN — DEXAMETHASONE SODIUM PHOSPHATE 4 MG: 10 INJECTION INTRAMUSCULAR; INTRAVENOUS at 13:50

## 2023-08-21 RX ADMIN — LIDOCAINE HYDROCHLORIDE 50 MG: 10 INJECTION, SOLUTION EPIDURAL; INFILTRATION; INTRACAUDAL; PERINEURAL at 13:35

## 2023-08-21 RX ADMIN — MORPHINE SULFATE 2 MG: 2 INJECTION, SOLUTION INTRAMUSCULAR; INTRAVENOUS at 10:02

## 2023-08-21 RX ADMIN — SODIUM CHLORIDE, PRESERVATIVE FREE 10 ML: 5 INJECTION INTRAVENOUS at 21:09

## 2023-08-21 RX ADMIN — PRAVASTATIN SODIUM 40 MG: 20 TABLET ORAL at 21:09

## 2023-08-21 RX ADMIN — ACETAMINOPHEN 650 MG: 325 TABLET ORAL at 01:17

## 2023-08-21 RX ADMIN — VANCOMYCIN HYDROCHLORIDE 1250 MG: 1.25 INJECTION, POWDER, LYOPHILIZED, FOR SOLUTION INTRAVENOUS at 18:01

## 2023-08-21 RX ADMIN — FENTANYL CITRATE 50 MCG: 50 INJECTION, SOLUTION INTRAMUSCULAR; INTRAVENOUS at 14:51

## 2023-08-21 RX ADMIN — VANCOMYCIN HYDROCHLORIDE 1250 MG: 1.25 INJECTION, POWDER, LYOPHILIZED, FOR SOLUTION INTRAVENOUS at 10:56

## 2023-08-21 RX ADMIN — SODIUM CHLORIDE, POTASSIUM CHLORIDE, SODIUM LACTATE AND CALCIUM CHLORIDE: 600; 310; 30; 20 INJECTION, SOLUTION INTRAVENOUS at 14:14

## 2023-08-21 RX ADMIN — FENTANYL CITRATE 25 MCG: 50 INJECTION, SOLUTION INTRAMUSCULAR; INTRAVENOUS at 11:57

## 2023-08-21 RX ADMIN — HYDROMORPHONE HYDROCHLORIDE 0.5 MG: 1 INJECTION, SOLUTION INTRAMUSCULAR; INTRAVENOUS; SUBCUTANEOUS at 16:45

## 2023-08-21 RX ADMIN — SODIUM CHLORIDE: 0.9 INJECTION, SOLUTION INTRAVENOUS at 13:27

## 2023-08-21 RX ADMIN — ROCURONIUM BROMIDE 50 MG: 10 INJECTION, SOLUTION INTRAVENOUS at 13:38

## 2023-08-21 RX ADMIN — MORPHINE SULFATE 1 MG: 2 INJECTION, SOLUTION INTRAMUSCULAR; INTRAVENOUS at 01:17

## 2023-08-21 RX ADMIN — MORPHINE SULFATE 1 MG: 2 INJECTION, SOLUTION INTRAMUSCULAR; INTRAVENOUS at 06:02

## 2023-08-21 RX ADMIN — FENTANYL CITRATE 50 MCG: 50 INJECTION, SOLUTION INTRAMUSCULAR; INTRAVENOUS at 16:05

## 2023-08-21 RX ADMIN — DEXTROSE MONOHYDRATE 125 ML: 100 INJECTION, SOLUTION INTRAVENOUS at 11:48

## 2023-08-21 RX ADMIN — SODIUM CHLORIDE, POTASSIUM CHLORIDE, SODIUM LACTATE AND CALCIUM CHLORIDE: 600; 310; 30; 20 INJECTION, SOLUTION INTRAVENOUS at 16:29

## 2023-08-21 RX ADMIN — PROPOFOL 150 MG: 10 INJECTION, EMULSION INTRAVENOUS at 13:35

## 2023-08-21 RX ADMIN — FENTANYL CITRATE 50 MCG: 50 INJECTION, SOLUTION INTRAMUSCULAR; INTRAVENOUS at 16:26

## 2023-08-21 RX ADMIN — SUGAMMADEX 200 MG: 100 INJECTION, SOLUTION INTRAVENOUS at 16:18

## 2023-08-21 RX ADMIN — HYDROMORPHONE HYDROCHLORIDE 0.5 MG: 1 INJECTION, SOLUTION INTRAMUSCULAR; INTRAVENOUS; SUBCUTANEOUS at 16:40

## 2023-08-21 RX ADMIN — MORPHINE SULFATE 2 MG: 2 INJECTION, SOLUTION INTRAMUSCULAR; INTRAVENOUS at 02:51

## 2023-08-21 RX ADMIN — SODIUM CHLORIDE: 9 INJECTION, SOLUTION INTRAVENOUS at 02:24

## 2023-08-21 RX ADMIN — MORPHINE SULFATE 2 MG: 2 INJECTION, SOLUTION INTRAMUSCULAR; INTRAVENOUS at 21:09

## 2023-08-21 RX ADMIN — FENTANYL CITRATE 50 MCG: 50 INJECTION, SOLUTION INTRAMUSCULAR; INTRAVENOUS at 15:41

## 2023-08-21 RX ADMIN — FLUOXETINE HYDROCHLORIDE 40 MG: 20 CAPSULE ORAL at 09:25

## 2023-08-21 RX ADMIN — FENTANYL CITRATE 50 MCG: 50 INJECTION, SOLUTION INTRAMUSCULAR; INTRAVENOUS at 14:34

## 2023-08-21 RX ADMIN — Medication 2000 MG: at 12:20

## 2023-08-21 RX ADMIN — MIDAZOLAM HYDROCHLORIDE 2 MG: 1 INJECTION, SOLUTION INTRAMUSCULAR; INTRAVENOUS at 12:48

## 2023-08-21 RX ADMIN — CEFTAROLINE FOSAMIL 600 MG: 600 POWDER, FOR SOLUTION INTRAVENOUS at 14:12

## 2023-08-21 ASSESSMENT — ENCOUNTER SYMPTOMS
SHORTNESS OF BREATH: 0
COUGH: 0
NAUSEA: 0
BLOOD IN STOOL: 0
ABDOMINAL DISTENTION: 0
DIARRHEA: 0

## 2023-08-21 ASSESSMENT — PAIN SCALES - GENERAL
PAINLEVEL_OUTOF10: 8
PAINLEVEL_OUTOF10: 9
PAINLEVEL_OUTOF10: 8
PAINLEVEL_OUTOF10: 7
PAINLEVEL_OUTOF10: 8
PAINLEVEL_OUTOF10: 10
PAINLEVEL_OUTOF10: 8
PAINLEVEL_OUTOF10: 9
PAINLEVEL_OUTOF10: 8

## 2023-08-21 ASSESSMENT — PAIN DESCRIPTION - LOCATION: LOCATION: HIP

## 2023-08-21 ASSESSMENT — PAIN DESCRIPTION - DESCRIPTORS
DESCRIPTORS: BURNING
DESCRIPTORS: BURNING;SHARP;THROBBING
DESCRIPTORS: BURNING

## 2023-08-21 ASSESSMENT — PAIN DESCRIPTION - ORIENTATION: ORIENTATION: LEFT

## 2023-08-21 ASSESSMENT — PAIN SCALES - WONG BAKER: WONGBAKER_NUMERICALRESPONSE: 8

## 2023-08-21 ASSESSMENT — PAIN - FUNCTIONAL ASSESSMENT: PAIN_FUNCTIONAL_ASSESSMENT: 0-10

## 2023-08-21 NOTE — CARE COORDINATION
Case Management Assessment  Initial Evaluation    Date/Time of Evaluation: 8/21/2023 9:22 AM  Assessment Completed by: Molly Collazo RN    If patient is discharged prior to next notation, then this note serves as note for discharge by case management. Patient Name: Brad Moralez                   YOB: 1980  Diagnosis: Surgical wound infection [T81.49XA]  Left hip pain [M25.552]  Abscess of left hip [L02.416]  Dehiscence of operative wound, sequela [T81.31XS]                   Date / Time: 8/20/2023  6:57 PM    Patient Admission Status: Inpatient   Readmission Risk (Low < 19, Mod (19-27), High > 27): Readmission Risk Score: 42.9    Current PCP: Mata Rahman  PCP verified by CM? (P) Yes    Chart Reviewed: Yes      History Provided by: (P) Patient  Patient Orientation: (P) Alert and Oriented    Patient Cognition: (P) Alert    Hospitalization in the last 30 days (Readmission):  Yes    If yes, Readmission Assessment in CM Navigator will be completed.     Advance Directives:      Code Status: Full Code   Patient's Primary Decision Maker is:      Primary Decision Maker: Chelly Alonsor - Parent - [phone]    Secondary Decision Maker: Namrata Lopezas - Brother/Sister - [phone]    Discharge Planning:    Patient lives with: (P) Children Type of Home: (P) [address] [place] [address] (vs home with [facility])  Primary Care Giver: (P) Self  Patient Support Systems include: (P) Children   Current Financial resources: (P) Medicaid, Medicare  Current community resources: (P) ECF/Home Care (pt was at [facility] but does not wish to return there)  Current services prior to admission: (P) Durable Medical Equipment            Current DME: (P) Glucometer, Walker            Type of Home Care services:  (P) Aide Services, Skilled Therapy, PT, OT    ADLS  Prior functional level: (P) Independent in ADLs/IADLs  Current functional level: (P) Independent in ADLs/IADLs    PT AM-PAC:   /24  OT AM-PAC: /24    Family can provide assistance at DC: (P) Yes (Pt relates [facility] but has not established care yet)  Would you like Case Management to discuss the discharge plan with any other family members/significant others, and if so, who? (P) No  Plans to Return to Present Housing: (P) Unknown at present (plan is OR)  Other Identified Issues/Barriers to RETURNING to current housing: SNF vs Home with [facility], [facility], surgery pending will need to establish needs post op  Potential Assistance needed at discharge: (P) [facility], [address] (needs to be determined)            Potential DME:    Patient expects to discharge to: (P) Trailer/mobile home  Plan for transportation at discharge: (P) Family    Financial    Payor: Vilma White / Plan: MEDICARE PART A AND B / Product Type: *No Product type* /     Does insurance require precert for SNF: No    Potential assistance Purchasing Medications: (P) No (Fills meds at [facility] on [address])  Meds-to-Beds request: Yes      [address], [address] - F [phone] [address] [place] [id number]  Phone: [phone] Fax: [phone]      Notes:    Factors facilitating achievement of predicted outcomes: Family support    Barriers to discharge: Pain    Additional Case Management Notes: goal is return home with Yony Francois pt agreeable to SNF, will provide list, first choice is [facility] for Transition of Care is related to the following treatment goals of Surgical wound infection [T81.49XA]  Left hip pain [M25.552]  Abscess of left hip [L02.416]  Dehiscence of operative wound, sequela [Q89.27YU]    IF APPLICABLE: The Patient and/or patient representative Darline Claude and her family were provided with a choice of provider and agrees with the discharge plan.  Freedom of choice list with basic dialogue that supports the patient's individualized plan of care/goals and shares the quality data

## 2023-08-21 NOTE — ED NOTES
Sarita Munguia RN at bedside with [address] to attempt for IV access     Chloe Hogan RN  08/20/23 7280

## 2023-08-21 NOTE — PROGRESS NOTES
Orthopedic Progress Note    Patient:  Thelma Velasco  YOB: 1980     39 y.o. female    Subjective:  Patient seen and examined this morning. No complaints or concerns. No issues overnight per nursing. Dressings to left hip changed per nursing one time since arrival. Pain is well controlled on current regimen. Denies fever, HA, CP, SOB, N/V, dysuria, new numbness/tingling. Vitals reviewed, afebrile    Objective:   Vitals:    08/21/23 0153   BP: (!) 100/57   Pulse: 62   Resp: 16   Temp:    SpO2:      Gen: NAD, cooperative    Cardiovascular: Regular rate   Respiratory: No acute respiratory distress, breathing comfortably    Orthopedic Exam  LLE:    Dressings are clean, dry, and intact without strike-through. Dressings removed for evaluation, previous wound without change as previously described, see media. Patient is unable to straight leg raise. Patient does have pain in the groin with internal and external log roll of the hip. EHL/FHL/TA/GS complex motor intact. Minimal dorsi/plantarflexion toes 2-5 b/l due to previous tenotomies. Sural/saphenous/SPN/DPN/plantar nerve distribution SILT. DP and PT pulses 2+ with BCR, toes are warm and well perfused. Recent Labs     08/20/23 2015   WBC 6.6   HGB 9.3*   HCT 30.0*   *   *   K 4.3   BUN 11   CREATININE 0.6   GLUCOSE 157*      Meds:   Abx: Vanc  DVT ppx: Fondaparineux  See rec for complete list    Impression 39 y.o. female who is being seen for:     -Left hip surgical site infection    Plan  - Plan for OR today 8/21 with Dr. Reji Cabral pending risk stratification. Would appreciate recs. -NPO in anticipation of OR  - Ancef OCTOR, antibiotic regimen per infectious disease  - Soft dressings on LLE. Okay to reinforce/maintain by nursing if necessary. Please notify Ortho if saturated. - WBAT  to the LLE  - Pain control and medical management per primary   - DVT ppx: Fondaparineux.  Please hold chemical anticoagulation in

## 2023-08-21 NOTE — CONSULTS
[facility] - Initial Consult Note  Today's Date and Time: 8/21/2023, 8:39 AM    Impression :   Left hip abscess/surgical wound dehisence  S/p left hip cecilio arthroplasty 7-19-23    Recommendations:   Ceftaroline 600 mg IV q 12 hr until MRSA is excluded      Medical Decision Making/Summary/Discussion:8/21/2023       Infection Control Recommendations   [place] Precautions  Contact isolation    Antimicrobial Stewardship Recommendations     Simplification of therapy  Targeted therapy    Coordination of Outpatient Care:   Estimated Length of IV antimicrobials:TBD  Patient will need Midline Catheter Insertion: TBD  Patient will need PICC line Insertion:TBD  Patient will need: Home IV , [address] [facility],  SNF,  LTAC: TBD  Patient will need outpatient wound care: Yes    Chief complaint/reason for consultation:   Left hip abscess      History of Present Illness:   Leo Stratton is a 39y.o.-year-old female who was initially admitted on 8/20/2023. Patient seen at the request of Dr. Orquidea Argueta:    Patient has a past medical history of prior MRSA infection, anxiety, alcohol abuse, history of emigdio-en-y gastric bypass, bipolar type 1, and drug abuse. She presents with a chief complaint of left hip surgical wound dehiscence. Patient had a left hip hemiarthroplasty on 7/19/2023. Wound staples were removed on 8/2/23. Patient states that wound was closed at time of staple removal. However, after about a week the incision started to separate and eventually started to drain. Her wound nurse measured an opening approximately 6 cm deep. The RN contacted orthopedic surgeon who sent patient to the emergency department for observation. Patient states that drainage has been seen from the wound which she describes as being milky and odorous.   Patient was previously on Bactrim for potential infection and failed to respond to Bactrim    CURRENT EVALUATION 02/01/2022 11:50 PM    RBC 2.97 08/21/2023 03:49 AM    RBC 4.43 06/03/2012 04:17 AM    TRICHOMONAS NOT REPORTED 02/01/2022 11:50 PM    WBC 6.9 08/21/2023 03:49 AM    YEAST FEW 03/12/2022 09:10 AM    TURBIDITY Clear 07/18/2023 10:00 PM     Lab Results   Component Value Date/Time    CREATININE 0.5 08/21/2023 03:49 AM    GLUCOSE 114 08/21/2023 03:49 AM    GLUCOSE 403 06/03/2012 04:17 AM       Medical Decision Making-Imaging:   XR HIP 2-3 VW W PELVIS LEFT    Result Date: 8/20/2023  EXAMINATION: ONE XRAY VIEW OF THE PELVIS AND 2 XRAY VIEWS LEFT HIP 8/20/2023 7:59 pm COMPARISON: Radiographs of left hip joint and pelvis on 8/7/2023 HISTORY: ORDERING SYSTEM PROVIDED HISTORY: concern for post operative infection TECHNOLOGIST PROVIDED HISTORY: Concern for post operative infection Reason for Exam: hip surgery 7/19/23,incision now red,painful,seeping FINDINGS: Redemonstration of intact left hip arthroplasty without any fracture or malalignment and without interval change. On rest of AP view of pelvis there is no fracture or malalignment. Right hip joint appears to be normal.  Evidence of moderate to marked multilevel spondylosis in the visualized lower lumbar spine and lumbosacral junction. Unchanged, intact left knee arthroplasty without fracture or malalignment. On rest of AP view of pelvis there is no acute process or change. Medical Decision Lltuvy-Wzyiyioq-Qlyva:     Results       Procedure Component Value Units Date/Time    Culture, Anaerobic and Aerobic [[id number]]     Order Status: Canceled Specimen: Wound     Culture, Anaerobic and Aerobic [[id number]]  (Abnormal) Collected: 08/20/23 2032    Order Status: Completed Specimen: Buttock Updated: 08/20/23 2236     Specimen Description . BUTTOCK     Direct Exam RARE NEUTROPHILS      FEW GRAM POSITIVE COCCI IN CLUSTERS     Culture PENDING    Culture, Blood 1 [[id number]] Collected: 08/20/23 1914    Order Status: Completed Specimen: Blood Updated: 08/20/23 2129

## 2023-08-21 NOTE — ANESTHESIA PRE PROCEDURE
Suicidal ideation  Toxic effect of ethanol, intentional self-harm  GI/Hepatic/Renal:   (+) GERD: no interval change, liver disease:,           Endo/Other:    (+) DiabetesType II DM, poorly controlled, using insulin, blood dyscrasia::., .                  ROS comment: antiphospholipid antibodies on Arixtra shots daily Abdominal:   (+) obese,           Vascular: negative vascular ROS. Other Findings:             Anesthesia Plan      general     ASA 3       Induction: intravenous. MIPS: Postoperative opioids intended and Prophylactic antiemetics administered. Anesthetic plan and risks discussed with patient. Plan discussed with CRNA.                     Hannah Gomez MD   8/21/2023

## 2023-08-21 NOTE — PROGRESS NOTES
Occupational Therapy  Facility/Department: [facility] RENAL//MED SURG  Occupational Therapy Initial Assessment    Name: Shraddha Montero  : 1980  MRN: [id number]  Date of Service: 2023    Discharge Recommendations:  Patient would benefit from continued therapy after discharge  OT Equipment Recommendations  Equipment Needed: Yes  Mobility Devices: Tomy Moores; ADL Assistive Devices  Walker: Rolling  ADL Assistive Devices: Reacher;Long-handled Sponge;Long-handled Shoe Horn;Sock-Aid Hard       Patient Diagnosis(es): The primary encounter diagnosis was Surgical wound infection. Diagnoses of Left hip pain and Dehiscence of operative wound, sequela were also pertinent to this visit. Past Medical History:  has a past medical history of Alcohol abuse, Anxiety, Arthritis, Painting esophagus, Benzodiazepine overdose, Bipolar disorder, unspecified ([address]), Bipolar I disorder, most recent episode depressed, severe without psychotic features ([address]), Cellulitis, Chronic abdominal wound infection, Constipation, Depression, Drug overdose, intentional ([address]), Genital herpes, unspecified, GERD (gastroesophageal reflux disease), History of pulmonary embolism, Hx of blood clots, Hypertension, Iron deficiency anemia, Isopropyl alcohol poisoning, Lumbosacral spondylosis without myelopathy, MDRO (multiple drug resistant organisms) resistance, Miscarriage, Morbid obesity ([address]), MRSA (methicillin resistant staph aureus) culture positive, Overdose of benzodiazepine, Patient in clinical research study, Pernicious anemia, Primary hypercoagulable state ([address]), Pulmonary embolism ([address]), Suicidal behavior, Suicidal ideation, Suicide attempt ([address]), SVT (supraventricular tachycardia) ([address]), Toxic effect of ethanol, intentional self-harm ([address]), and Type 2 diabetes mellitus, with long-term current use of insulin ([address]). Past Surgical History:  has a past surgical history that includes Cholecystectomy; Liver Resection; hernia repair;  Tonsillectomy;

## 2023-08-21 NOTE — ED NOTES
The following labs were labeled with appropriate pt sticker and tubed to lab:     [x] Blue     [x] Lavender   [] on ice  [x] Green/yellow  [x] Green/black [x] on ice  [] Tenna Luigi  [] on ice  [] Yellow  [x] Red  [] Type/ Screen  [] ABG  [] VBG    [] COVID-19 swab    [] Rapid  [] PCR  [] Flu swab  [] Peds Viral Panel     [] Urine Sample  [] Fecal Sample  [] Pelvic Cultures  [x] Blood Cultures  [x] X 1  [x] Wound Cultures       Maria C Mar RN  08/20/23 2025

## 2023-08-21 NOTE — DISCHARGE INSTRUCTIONS
Orthopaedic Instructions:  -Weight bearing status: Weight bearing as tolerated with the left leg  -Do not remove dressings until your follow up visit. Ensure wound-vac is maintaining suction at all times. When prevena dies in 7-10 days, disconnect machine and clip or tie off hose. Do not remove dressing.  -Always look for signs of compartment syndrome: pain out of proportion to the injury, pain not controlled with pain medication, numbness in digits, changing of color of digits (paleness). If these signs occur return to ED immediately for reassessment.  -Always work on ankle, knee motion to decrease swelling.  -Ice (20 minutes on and off 1 hour) and elevate to reduce swelling and throbbing pain.  -Call the office or come to Emergency Room if signs of infection appear (hot, swollen, red, draining pus, fever)  -Take medications as prescribed.  -Wean off narcotics (percocet/norco) as soon as possible. Do not take tylenol if still taking narcotics.  -Follow up with Dr. Vero Tinoco in his office on 9/6/23 at 2:15pm. Call [phone] to schedule/confirm or with any questions/concerns.

## 2023-08-21 NOTE — H&P
Creatinine 0.6 0.5 - 0.9 mg/dL    Est, Glom Filt Rate >60 >60 mL/min/1.73m2    Calcium 7.8 (L) 8.6 - 10.4 mg/dL    Sodium 134 (L) 135 - 144 mmol/L    Potassium 4.3 3.7 - 5.3 mmol/L    Chloride 104 98 - 107 mmol/L    CO2 21 20 - 31 mmol/L    Anion Gap 9 9 - 17 mmol/L    Alkaline Phosphatase 149 (H) 35 - 104 U/L    ALT 12 5 - 33 U/L    AST 17 <32 U/L    Total Bilirubin 0.2 (L) 0.3 - 1.2 mg/dL    Total Protein 5.1 (L) 6.4 - 8.3 g/dL    Albumin 1.6 (L) 3.5 - 5.2 g/dL    Albumin/Globulin Ratio 0.5 (L) 1.0 - 2.5   Lactate, Sepsis    Collection Time: 08/20/23  8:15 PM   Result Value Ref Range    Lactic Acid, Sepsis, Whole Blood 1.1 0.5 - 1.9 mmol/L   C-Reactive Protein    Collection Time: 08/20/23  8:15 PM   Result Value Ref Range    CRP 37.2 (H) 0.0 - 5.0 mg/L   Sedimentation Rate    Collection Time: 08/20/23  8:15 PM   Result Value Ref Range    Sed Rate 25 (H) 0 - 20 mm/Hr   Culture, Anaerobic and Aerobic    Collection Time: 08/20/23  8:32 PM    Specimen: Buttock   Result Value Ref Range    Specimen Description . BUTTOCK     Direct Exam RARE NEUTROPHILS (A)     Direct Exam FEW GRAM POSITIVE COCCI IN CLUSTERS (A)     Culture PENDING        Imaging:   XR HIP 2-3 VW W PELVIS LEFT    Result Date: 8/20/2023  Unchanged, intact left knee arthroplasty without fracture or malalignment. On rest of AP view of pelvis there is no acute process or change. ASSESSMENT & PLAN     ASSESSMENT / PLAN:     IMPRESSION  This is a 39 y.o. female who presented with surgical wound dehiscence and found to have surgical wound infection. Principal Problem:    Abscess of left hip  Active Problems:    Antiphospholipid syndrome (HCC)    Obesity, Class III, BMI 40-49.9 (morbid obesity) ([address])    Bipolar depression ([address])  Resolved Problems:    * No resolved hospital problems.  *     Abscess of left hip  Surgical wound dehiscence  - Left hip hemiarthroplasty conducted last month  - Ortho to conduct wound washout and reclosure this week  - ID on

## 2023-08-21 NOTE — PROGRESS NOTES
[facility]  Internal Medicine Teaching Residency Program  Inpatient Daily Progress Note  ______________________________________________________________________________    Patient: Gilma Power  YOB: 1980   CFJ:[id number]    Acct: [de-identified]     Room: [address] [place] [address]  Admit date: 8/20/2023  Today's date: 08/21/23  Number of days in the hospital: 1    SUBJECTIVE   Admitting Diagnosis: Abscess of left hip  CC: left hip surgical site dehiscence     - Pt examined at bedside. Chart & results reviewed. - Pain was persistent overnight  - Ortho considering surgical washout today     Review of Systems   Constitutional:  Negative for chills and fever. Respiratory:  Negative for cough and shortness of breath. Cardiovascular:  Negative for chest pain, palpitations and leg swelling. Gastrointestinal:  Negative for abdominal distention, blood in stool, diarrhea and nausea. Musculoskeletal:  Negative for myalgias. Skin:  Positive for wound. Neurological:  Negative for headaches. BRIEF HISTORY     The patient is a pleasant 39 y.o. female with PMH of past MRSA infection, anxiety, alcohol abuse, hx of emigdio-en-y, bipolar type 1, and drug abuse presents with a chief complaint of left hip surgical wound dehiscence. The patient had a left hip hemiarthroplast 7/19/23. The patient's states that she has surgical sutures removed 8/2. After the sutures came out the wound stayed relatively intact for a weeks or so, but a few days ago began to separate. Significant drainage from wound has been noted expelling from the wound of the past week with some being milky. She did receive some bactrim from her facility for a few days. ED course  Ortho consulted from ED. Ortho wanting to perform surgery this week to wash out wound and reclose. Wound swaps obtained. CRP and sed rate increased.  WBC normal. LA normal. Patient was hemodynamically stable      OBJECTIVE Continuous PRN  pantoprazole, 40 mg, Daily PRN  midodrine, 2.5 mg, BID PRN        Diagnostic Labs:  CBC:   Recent Labs     08/20/23 2015   WBC 6.6   RBC 2.89*   HGB 9.3*   HCT 30.0*   .8*   RDW 13.7   *     BMP:   Recent Labs     08/20/23 2015   *   K 4.3      CO2 21   BUN 11   CREATININE 0.6     BNP: No results for input(s): BNP in the last 72 hours. PT/INR: No results for input(s): PROTIME, INR in the last 72 hours. APTT: No results for input(s): APTT in the last 72 hours. CARDIAC ENZYMES: No results for input(s): CKMB, CKMBINDEX, TROPONINI in the last 72 hours. Invalid input(s): CKTOTAL;3  FASTING LIPID PANEL:  Lab Results   Component Value Date    CHOL 74 04/18/2023    HDL 36 (L) 04/18/2023    TRIG 97 04/18/2023     LIVER PROFILE:   Recent Labs     08/20/23 2015   AST 17   ALT 12   BILITOT 0.2*   ALKPHOS 149*      MICROBIOLOGY:   Lab Results   Component Value Date/Time    CULTURE PENDING 08/20/2023 08:32 PM       Imaging:    XR HIP 2-3 VW W PELVIS LEFT    Result Date: 8/20/2023  Unchanged, intact left knee arthroplasty without fracture or malalignment. On rest of AP view of pelvis there is no acute process or change. ASSESSMENT & PLAN     AThnathan is a 39 y.o. female who presented with surgical wound dehiscence and found to have surgical wound infection. Principal Problem:    Abscess of left hip  Active Problems:    Antiphospholipid syndrome (HCC)    Obesity, Class III, BMI 40-49.9 (morbid obesity) ([address])    Bipolar depression ([address])  Resolved Problems:    * No resolved hospital problems.  *      Abscess of left hip  Surgical wound dehiscence  - Left hip hemiarthroplasty conducted last month  - Ortho to conduct wound washout and reclosure, possibly today   - ID on board  - Surgical site growing GPC in clusters  - Continue vancomycin        Antiphospholipid syndrome ([address])  - Will continue fondaparinux from home        Obesity, Class III, BMI 40-49.9 (morbid obesity)   -

## 2023-08-21 NOTE — ED NOTES
Unable to give any IV medication at this time d/t no IV access.  Writer will attempt to find another RN for [address], RN  08/20/23 5417

## 2023-08-21 NOTE — BRIEF OP NOTE
Brief Postoperative Note      Patient: Cris Quiroz  YOB: 1980  MRN: [id number]    Date of Procedure: 8/21/2023    Pre-Op Diagnosis Codes:  Left hip incisional dehiscence  Left hip surgical site infection    Post-Op Diagnosis:   Left hip incisional dehiscence  Left hip surgical site infection       Procedure(s):  Irrigation and excisional debridement of skin, subq tissue, fascia, muscle measuring 20 cm x 10 cm  Secondary closure of left hip wound measuring 20 cm x 10 cm  Application of antibiotic drug eluting system, left hip  Application of negative pressure wound vacuum dressing left hip    Surgeon(s):  Apurva Owusu DO    Assistant:  Resident: Romina Orellana DO    Anesthesia: General    Estimated Blood Loss (mL): 50 cc    Fluids: 1300 cc crystalloid    Complications: None    Specimens:   * No specimens in log *    Implants:  Implant Name Type Inv. Item Serial No.  Lot No. LRB No. Used Action   KIT VOID FILL STIMULAN RAP CURE 20CC - [id number]  KIT VOID FILL STIMULAN RAP CURE 20CC  [facility] [facility] [id number] 1 Implanted         Drains:   Negative Pressure Wound Therapy Leg Left;Upper;Dorsal (Active)       [REMOVED] Closed/Suction Drain Left Hip Accordion (Removed)   Site Description Clean, dry & intact 07/21/23 1400   Dressing Status Clean, dry & intact 07/21/23 1400   Drainage Appearance Bloody; Clots 07/21/23 1400   Drain Status Compressed 07/21/23 1400   Output (ml) 75 ml 07/21/23 0655       [REMOVED] External Urinary Catheter (Removed)   Site Assessment Clean,dry & intact 08/07/23 0830   Placement Initiated 08/07/23 0830   Securement Method Securing device (Describe) 08/07/23 0830   Perineal Care Yes 08/07/23 0830       Findings: Purulence of the left hip surgical site/dehiscence, without communication to the deep implants. Wound measuring 20 cm x 10 cm. See op note.       Electronically signed by Apurva Owusu DO

## 2023-08-21 NOTE — PROGRESS NOTES
Physical Therapy  Facility/Department: [facility] RENAL//MED SURG  Physical Therapy Initial Assessment    Name: Cele Roman  : 1980  MRN: [id number]  Date of Service: 2023    Discharge Recommendations:  Patient would benefit from continued therapy after discharge          Patient Diagnosis(es): The primary encounter diagnosis was Surgical wound infection. Diagnoses of Left hip pain and Dehiscence of operative wound, sequela were also pertinent to this visit. Past Medical History:  has a past medical history of Alcohol abuse, Anxiety, Arthritis, Painting esophagus, Benzodiazepine overdose, Bipolar disorder, unspecified ([address]), Bipolar I disorder, most recent episode depressed, severe without psychotic features ([address]), Cellulitis, Chronic abdominal wound infection, Constipation, Depression, Drug overdose, intentional ([address]), Genital herpes, unspecified, GERD (gastroesophageal reflux disease), History of pulmonary embolism, Hx of blood clots, Hypertension, Iron deficiency anemia, Isopropyl alcohol poisoning, Lumbosacral spondylosis without myelopathy, MDRO (multiple drug resistant organisms) resistance, Miscarriage, Morbid obesity ([address]), MRSA (methicillin resistant staph aureus) culture positive, Overdose of benzodiazepine, Patient in clinical research study, Pernicious anemia, Primary hypercoagulable state ([address]), Pulmonary embolism ([address]), Suicidal behavior, Suicidal ideation, Suicide attempt ([address]), SVT (supraventricular tachycardia) ([address]), Toxic effect of ethanol, intentional self-harm ([address]), and Type 2 diabetes mellitus, with long-term current use of insulin ([address]). Past Surgical History:  has a past surgical history that includes Cholecystectomy; Liver Resection; hernia repair; Tonsillectomy; Endoscopy, colon, diagnostic; Abdominal hernia repair (); Abdominal hernia repair (11/3/14); Bariatric Surgery (); Dilation and curettage of uterus ();  Finger amputation (Left, 2015); lymph node an 8/10 pain in her L hip         Social/Functional History  Social/Functional History  Lives With:  (Ex-. Ex- recovering from toe amputations and is recovering himself.)  Type of Home: House  Home Layout: One level  Home Access: Stairs to enter with rails  Entrance Stairs - Number of Steps: 5  Entrance Stairs - Rails: Right  Bathroom Shower/Tub: Tub/Shower unit, Curtain  Bathroom Toilet: Standard  Bathroom Equipment: None, Toilet raiser, Grab bars around toilet, Grab bars in shower, Tub transfer bench  Home Equipment: [place] (Recieved a walker from nursing home; however reports is unable to use because its too narrow. Using mothers and will have to get it back because mom needs it.)  Receives Help From: Family  ADL Assistance: Independent  Homemaking Assistance: Independent  Homemaking Responsibilities: Yes  Meal Prep Responsibility: Primary  Laundry Responsibility: Primary  Cleaning Responsibility: Primary  Shopping Responsibility: Primary  Ambulation Assistance: Independent  Transfer Assistance: Independent  Active : Yes  Mode of Transportation: Car  Occupation: On disability  Leisure & Hobbies: Hanging out with friends, [employer]  Additional Comments: Information above is prior to hip surgery in July. Pt only was home for 1 day from nursing home. Pt reports ex- is not able to assist much d/t just coming home having multiple toe amputations and is recovering.  Would not be able physically assist.  Vision/Hearing  Vision  Vision: Impaired  Vision Exceptions:  (Contacts)  Hearing  Hearing: Within functional limits    Cognition   Orientation  Overall Orientation Status: Within Functional Limits  Orientation Level: Oriented X4  Cognition  Overall Cognitive Status: WFL     Objective   Pulse: 96  Heart Rate Source: Monitor  BP: 123/72  BP Location: Right Arm  BP Method: Automatic  Patient Position: Semi fowlers  MAP (Calculated): 89  Respirations: 18  SpO2: 96 %  O2 Device: None (Room

## 2023-08-22 PROBLEM — A49.01 STAPH AUREUS INFECTION: Status: ACTIVE | Noted: 2023-08-22

## 2023-08-22 PROBLEM — T81.49XA SURGICAL WOUND INFECTION: Status: ACTIVE | Noted: 2023-08-22

## 2023-08-22 LAB
ANION GAP SERPL CALCULATED.3IONS-SCNC: 7 MMOL/L (ref 9–17)
BASOPHILS # BLD: 0.04 K/UL (ref 0–0.2)
BASOPHILS NFR BLD: 1 % (ref 0–2)
BUN SERPL-MCNC: 5 MG/DL (ref 6–20)
CALCIUM SERPL-MCNC: 8.3 MG/DL (ref 8.6–10.4)
CHLORIDE SERPL-SCNC: 107 MMOL/L (ref 98–107)
CO2 SERPL-SCNC: 22 MMOL/L (ref 20–31)
CREAT SERPL-MCNC: 0.4 MG/DL (ref 0.5–0.9)
CRP SERPL HS-MCNC: 47.7 MG/L (ref 0–5)
EKG ATRIAL RATE: 81 BPM
EKG P AXIS: 63 DEGREES
EKG P-R INTERVAL: 156 MS
EKG Q-T INTERVAL: 398 MS
EKG QRS DURATION: 92 MS
EKG QTC CALCULATION (BAZETT): 462 MS
EKG R AXIS: 66 DEGREES
EKG T AXIS: 44 DEGREES
EKG VENTRICULAR RATE: 81 BPM
EOSINOPHIL # BLD: 0.08 K/UL (ref 0–0.44)
EOSINOPHILS RELATIVE PERCENT: 1 % (ref 1–4)
ERYTHROCYTE [DISTWIDTH] IN BLOOD BY AUTOMATED COUNT: 13.7 % (ref 11.8–14.4)
GFR SERPL CREATININE-BSD FRML MDRD: >60 ML/MIN/1.73M2
GLUCOSE BLD-MCNC: 111 MG/DL (ref 65–105)
GLUCOSE BLD-MCNC: 121 MG/DL (ref 65–105)
GLUCOSE BLD-MCNC: 81 MG/DL (ref 65–105)
GLUCOSE SERPL-MCNC: 117 MG/DL (ref 70–99)
HCT VFR BLD AUTO: 29.8 % (ref 36.3–47.1)
HGB BLD-MCNC: 9.5 G/DL (ref 11.9–15.1)
IMM GRANULOCYTES # BLD AUTO: 0.06 K/UL (ref 0–0.3)
IMM GRANULOCYTES NFR BLD: 1 %
LYMPHOCYTES NFR BLD: 1.23 K/UL (ref 1.1–3.7)
LYMPHOCYTES RELATIVE PERCENT: 16 % (ref 24–43)
MCH RBC QN AUTO: 31.3 PG (ref 25.2–33.5)
MCHC RBC AUTO-ENTMCNC: 31.9 G/DL (ref 28.4–34.8)
MCV RBC AUTO: 98 FL (ref 82.6–102.9)
MICROORGANISM SPEC CULT: ABNORMAL
MICROORGANISM/AGENT SPEC: ABNORMAL
MICROORGANISM/AGENT SPEC: ABNORMAL
MONOCYTES NFR BLD: 0.62 K/UL (ref 0.1–1.2)
MONOCYTES NFR BLD: 8 % (ref 3–12)
NEUTROPHILS NFR BLD: 73 % (ref 36–65)
NEUTS SEG NFR BLD: 5.5 K/UL (ref 1.5–8.1)
NRBC BLD-RTO: 0 PER 100 WBC
PLATELET # BLD AUTO: 607 K/UL (ref 138–453)
PMV BLD AUTO: 10.1 FL (ref 8.1–13.5)
POTASSIUM SERPL-SCNC: 5 MMOL/L (ref 3.7–5.3)
RBC # BLD AUTO: 3.04 M/UL (ref 3.95–5.11)
SODIUM SERPL-SCNC: 136 MMOL/L (ref 135–144)
SPECIMEN DESCRIPTION: ABNORMAL
WBC OTHER # BLD: 7.5 K/UL (ref 3.5–11.3)

## 2023-08-22 PROCEDURE — 6360000002 HC RX W HCPCS: Performed by: ORTHOPAEDIC SURGERY

## 2023-08-22 PROCEDURE — 2580000003 HC RX 258: Performed by: ORTHOPAEDIC SURGERY

## 2023-08-22 PROCEDURE — 36415 COLL VENOUS BLD VENIPUNCTURE: CPT

## 2023-08-22 PROCEDURE — 80048 BASIC METABOLIC PNL TOTAL CA: CPT

## 2023-08-22 PROCEDURE — 85025 COMPLETE CBC W/AUTO DIFF WBC: CPT

## 2023-08-22 PROCEDURE — 6360000002 HC RX W HCPCS

## 2023-08-22 PROCEDURE — 86140 C-REACTIVE PROTEIN: CPT

## 2023-08-22 PROCEDURE — 82947 ASSAY GLUCOSE BLOOD QUANT: CPT

## 2023-08-22 PROCEDURE — 6370000000 HC RX 637 (ALT 250 FOR IP): Performed by: ORTHOPAEDIC SURGERY

## 2023-08-22 PROCEDURE — 93010 ELECTROCARDIOGRAM REPORT: CPT | Performed by: INTERNAL MEDICINE

## 2023-08-22 PROCEDURE — 99232 SBSQ HOSP IP/OBS MODERATE 35: CPT | Performed by: INTERNAL MEDICINE

## 2023-08-22 PROCEDURE — 1200000000 HC SEMI PRIVATE

## 2023-08-22 PROCEDURE — 6360000002 HC RX W HCPCS: Performed by: INTERNAL MEDICINE

## 2023-08-22 RX ADMIN — CEFTAROLINE FOSAMIL 600 MG: 600 POWDER, FOR SOLUTION INTRAVENOUS at 00:39

## 2023-08-22 RX ADMIN — MORPHINE SULFATE 2 MG: 2 INJECTION, SOLUTION INTRAMUSCULAR; INTRAVENOUS at 00:40

## 2023-08-22 RX ADMIN — SODIUM CHLORIDE: 9 INJECTION, SOLUTION INTRAVENOUS at 20:09

## 2023-08-22 RX ADMIN — MORPHINE SULFATE 2 MG: 2 INJECTION, SOLUTION INTRAMUSCULAR; INTRAVENOUS at 22:33

## 2023-08-22 RX ADMIN — FONDAPARINUX SODIUM 10 MG: 5 INJECTION, SOLUTION SUBCUTANEOUS at 10:17

## 2023-08-22 RX ADMIN — HYDROMORPHONE HYDROCHLORIDE 0.25 MG: 1 INJECTION, SOLUTION INTRAMUSCULAR; INTRAVENOUS; SUBCUTANEOUS at 12:22

## 2023-08-22 RX ADMIN — MORPHINE SULFATE 2 MG: 2 INJECTION, SOLUTION INTRAMUSCULAR; INTRAVENOUS at 09:28

## 2023-08-22 RX ADMIN — MORPHINE SULFATE 2 MG: 2 INJECTION, SOLUTION INTRAMUSCULAR; INTRAVENOUS at 18:24

## 2023-08-22 RX ADMIN — SODIUM CHLORIDE, PRESERVATIVE FREE 10 ML: 5 INJECTION INTRAVENOUS at 00:40

## 2023-08-22 RX ADMIN — Medication 2000 MG: at 14:24

## 2023-08-22 RX ADMIN — SODIUM CHLORIDE, PRESERVATIVE FREE 10 ML: 5 INJECTION INTRAVENOUS at 22:33

## 2023-08-22 RX ADMIN — MORPHINE SULFATE 2 MG: 2 INJECTION, SOLUTION INTRAMUSCULAR; INTRAVENOUS at 05:11

## 2023-08-22 RX ADMIN — MORPHINE SULFATE 2 MG: 2 INJECTION, SOLUTION INTRAMUSCULAR; INTRAVENOUS at 14:19

## 2023-08-22 RX ADMIN — SODIUM CHLORIDE: 9 INJECTION, SOLUTION INTRAVENOUS at 02:50

## 2023-08-22 RX ADMIN — PRAVASTATIN SODIUM 40 MG: 20 TABLET ORAL at 20:06

## 2023-08-22 RX ADMIN — VANCOMYCIN HYDROCHLORIDE 1250 MG: 1.25 INJECTION, POWDER, LYOPHILIZED, FOR SOLUTION INTRAVENOUS at 02:53

## 2023-08-22 RX ADMIN — Medication 2000 MG: at 20:06

## 2023-08-22 RX ADMIN — VANCOMYCIN HYDROCHLORIDE 1250 MG: 1.25 INJECTION, POWDER, LYOPHILIZED, FOR SOLUTION INTRAVENOUS at 10:25

## 2023-08-22 RX ADMIN — SODIUM CHLORIDE, PRESERVATIVE FREE 10 ML: 5 INJECTION INTRAVENOUS at 05:11

## 2023-08-22 RX ADMIN — FLUOXETINE HYDROCHLORIDE 40 MG: 20 CAPSULE ORAL at 09:32

## 2023-08-22 RX ADMIN — SODIUM CHLORIDE, PRESERVATIVE FREE 10 ML: 5 INJECTION INTRAVENOUS at 20:06

## 2023-08-22 ASSESSMENT — PAIN SCALES - GENERAL
PAINLEVEL_OUTOF10: 8
PAINLEVEL_OUTOF10: 7
PAINLEVEL_OUTOF10: 8
PAINLEVEL_OUTOF10: 7
PAINLEVEL_OUTOF10: 7

## 2023-08-22 ASSESSMENT — PAIN DESCRIPTION - DESCRIPTORS
DESCRIPTORS: DISCOMFORT

## 2023-08-22 ASSESSMENT — PAIN DESCRIPTION - LOCATION
LOCATION: HIP

## 2023-08-22 ASSESSMENT — PAIN DESCRIPTION - ORIENTATION
ORIENTATION: LEFT

## 2023-08-22 NOTE — ANESTHESIA POSTPROCEDURE EVALUATION
Department of Anesthesiology  Postprocedure Note    Patient: Tanja Francis  MRN: [id number]  [facility] [place]: 1980  Date of evaluation: 8/22/2023      Procedure Summary     Date: 08/21/23 Room / Location: [address]     Anesthesia Start: 1241 Anesthesia Stop: 1681    Procedure: LEFT HIP IRRIGATION AND DEBRIDEMENT AND PLACEMENT OF ANTIBIOTIC IMPREGNATED CEMENT BEADS (Left: Hip) Diagnosis:       Bleeding from left hip wound, initial encounter      (Bleeding from left hip wound, initial encounter [S71.002A])    Surgeons: Aleksandar Galvan DO Responsible Provider: Heather Watkins MD    Anesthesia Type: general ASA Status: 3          Anesthesia Type: No value filed.     Aston Phase I: Aston Score: 10    Aston Phase II:        Anesthesia Post Evaluation    Patient location during evaluation: PACU  Patient participation: complete - patient participated  Level of consciousness: awake and alert  Airway patency: patent  Nausea & Vomiting: no nausea and no vomiting  Complications: no  Cardiovascular status: blood pressure returned to baseline  Respiratory status: acceptable  Hydration status: stable  Pain management: adequate

## 2023-08-22 NOTE — DISCHARGE INSTR - COC
Amount Moderate (25-50%) 08/21/23 1240   Drainage Description Serosanguinous; Yellow 08/21/23 1240   Odor None 08/21/23 0153   Valerie-incision Assessment Denuded 08/21/23 0153   Number of days: 33       Incision 08/21/23 Femoral Left (Active)   Dressing Status Clean;Dry; Intact 08/21/23 2013   Dressing/Treatment Foam 08/21/23 2013   Closure Other (Comment) 08/21/23 2013   Drainage Amount None (dry) 08/21/23 2013   Number of days: 0        Elimination:  Continence: Bowel: Yes  Bladder: Yes  Urinary Catheter: None   Colostomy/Ileostomy/Ileal Conduit: No       Date of Last BM: 8/25/23    Intake/Output Summary (Last 24 hours) at 8/22/2023 1124  Last data filed at 8/22/2023 0810  Gross per 24 hour   Intake 5281.6 ml   Output 2500 ml   Net 2781.6 ml     I/O last 3 completed shifts: In: 5281.6 [P.O.:960; I.V.:2512.3; IV Piggyback:1809.3]  Out: 1500 [Urine:1450; Blood:50]    Safety Concerns: At Risk for Falls    Impairments/Disabilities:      None    Nutrition Therapy:  Current Nutrition Therapy:   - Oral Diet:  General    Routes of Feeding: Oral  Liquids: No Restrictions  Daily Fluid Restriction: no  Last Modified Barium Swallow with Video (Video Swallowing Test): not done    Treatments at the Time of Hospital Discharge:   Respiratory Treatments: na  Oxygen Therapy:  is not on home oxygen therapy.   Ventilator:    - No ventilator support    Rehab Therapies: Physical Therapy and Occupational Therapy  Weight Bearing Status/Restrictions: No weight bearing restrictions  Other Medical Equipment (for information only, NOT a DME order):  na  Other Treatments: Monitor wound vac    Patient's personal belongings (please select all that are sent with patient):  None    RN SIGNATURE:  Electronically signed by Zhane Samuel RN on 8/25/23 at 5:19 PM EDT    CASE MANAGEMENT/SOCIAL WORK SECTION    Inpatient Status Date: 8/20/23    Readmission Risk Assessment Score:  Readmission Risk              Risk of Unplanned Readmission:  73 Discharging to Facility/ Agency   Name : [facility]  Address:  Phone:  Fax:    Dialysis Facility (if applicable)   Name:  Address:  Dialysis Schedule:  Phone:  Fax:    / signature: Electronically signed by Anni Sprague RN on 8/22/23 at 11:24 AM EDT    PHYSICIAN SECTION    Prognosis: Fair    Condition at Discharge: Stable    Rehab Potential (if transferring to Rehab): Good    Recommended Labs or Other Treatments After Discharge: Orthopaedic Instructions:  -Weight bearing status: Weight bearing as tolerated with the left leg  -Do not remove dressings until your follow up visit. Ensure wound-vac is maintaining suction at all times. When prevena dies in 7-10 days, disconnect machine and clip or tie off hose. Do not remove dressing.  -Always look for signs of compartment syndrome: pain out of proportion to the injury, pain not controlled with pain medication, numbness in digits, changing of color of digits (paleness). If these signs occur return to ED immediately for reassessment.  -Always work on ankle, knee motion to decrease swelling.  -Ice (20 minutes on and off 1 hour) and elevate to reduce swelling and throbbing pain.  -Call the office or come to Emergency Room if signs of infection appear (hot, swollen, red, draining pus, fever)  -Take medications as prescribed.  -Wean off narcotics (percocet/norco) as soon as possible. Do not take tylenol if still taking narcotics.  -Follow up with Dr. Epi Merino in his office on 9/6/23 at 2:15pm. Call [phone] to schedule/confirm or with any questions/concerns. Physician Certification: I certify the above information and transfer of Cecelia Vang  is necessary for the continuing treatment of the diagnosis listed and that she requires [address] for greater 30 days.      Update Admission H&P: No change in H&P    PHYSICIAN SIGNATURE:  Electronically signed by Carlos Alberto Lozano MD on 8/25/23 at 4:33 PM EDT

## 2023-08-22 NOTE — PLAN OF CARE
Attestation and add on       I have discussed the care of Janeth Garrett , including pertinent history and exam findings,      8/22/23    with the resident. I have seen and examined the patient and the key elements of all parts of the encounter have been performed by me . I agree with the assessment, plan and orders as documented by the resident. Hospital Problems             Last Modified POA    * (Principal) Abscess of left hip 8/20/2023 Yes    Antiphospholipid syndrome ([address]) 8/21/2023 Yes    Obesity, Class III, BMI 40-49.9 (morbid obesity) ([address]) 8/21/2023 Yes    Bipolar depression ([address]) 8/21/2023 Yes    Surgical wound infection 8/22/2023 Yes           --s/p drainage , improving-- ;        Medications: Allergies: Allergies   Allergen Reactions    Aspirin      Patient is on chronic anticoagulation  Patient is on chronic anticoagulation  Patient is on chronic anticoagulation    Betadine [Povidone Iodine]     Celexa [Citalopram Hydrobromide]     Citalopram Itching, Rash and Swelling     Other reaction(s): Unknown    Furosemide      Tolerates Bumex  Other reaction(s): Unknown    Nitrofurantoin      Other reaction(s): Unknown    Aripiprazole Itching and Swelling     Other reaction(s):  Other  Abilify    Codeine Rash and Hives     Other reaction(s): Unknown    Lithium Nausea And Vomiting     Other reaction(s): Unknown    Morphine Itching and Other (See Comments)     Other reaction(s): GI Intolerance, Other (See Comments)    Paroxetine Rash and Hives     Other reaction(s): Unknown  Paxil    Paxil [Paroxetine Hcl] Rash    Povidone Iodine Rash     Other reaction(s): Unknown    Sertraline Hives and Rash    Tape [Adhesive Tape] Rash     Paper tape    Tegretol [Carbamazepine] Rash       Current Meds:   Scheduled Meds:    HYDROmorphone  0.25 mg IntraVENous Once    sodium chloride flush  5-40 mL IntraVENous 2 times per day    insulin lispro  0-8 Units SubCUTAneous TID WC    insulin lispro  0-4 Units Lactation Progress Note      Data:     RN requests f/u support with latching while LC was in with another patient assisting with latch. Upon consult, MOB already has infant latched on the left breast, good deep latch observed with SRS and AS. MOB confirms the latch is comfortable and denies discomfort. MOB states baby was fussy and had trouble initially getting baby to latch, but then was able to get baby latched. Action: Reviewed tips for calming infant and achieve deep latch as needed. Reassured of CAROL observed. Encouraged to call for f/u support with latching on right breast if needed with next feed. Name and number on whiteboard. Encouraged to call for f/u support prn. Response: Verbalized understanding and comfortable with breast feeding. Will call for f/u support prn. SubCUTAneous Nightly    fondaparinux  10 mg SubCUTAneous Daily    FLUoxetine  40 mg Oral Daily    pravastatin  40 mg Oral Nightly    vancomycin (VANCOCIN) intermittent dosing (placeholder)   Other RX Placeholder    vancomycin  1,250 mg IntraVENous Q8H    ceftaroline fosamil (TEFLARO) IVPB  600 mg IntraVENous Q12H     Continuous Infusions:    sodium chloride      sodium chloride 75 mL/hr at 08/22/23 0634    dextrose       PRN Meds: sodium chloride flush, sodium chloride, ondansetron **OR** ondansetron, polyethylene glycol, acetaminophen **OR** acetaminophen, glucose, dextrose bolus **OR** dextrose bolus, glucagon (rDNA), dextrose, pantoprazole, midodrine, morphine      Miguel Canales[address], [address] [facility].    Phone [phone]   Fax: [phone]  Answering Service: [phone]

## 2023-08-22 NOTE — PROGRESS NOTES
[facility] - Progress Note  Today's Date and Time: 8/22/2023, 11:00 AM    Impression :   Left hip abscess/surgical wound dehiscence. MSSA  S/p left hip cecilio arthroplasty 7-19-23    Recommendations:   D/C Ceftaroline as MRSA is excluded  Cefazolin 2 gm IV q 8 Hr . Stop date 9-21-23    Medical Decision Making/Summary/Discussion:8/22/2023       Infection Control Recommendations   [place] Precautions  Contact isolation    Antimicrobial Stewardship Recommendations     Simplification of therapy  Targeted therapy    Coordination of Outpatient Care:   Estimated Length of IV antimicrobials: 9-21-23  Patient will need Midline Catheter Insertion: Yes  Patient will need PICC line Insertion:TBD  Patient will need: Home IV , [address] [place],  SNF,  LTAC: TBD  Patient will need outpatient wound care: Yes    Chief complaint/reason for consultation:   Left hip abscess      History of Present Illness:   Татьяна Das is a 39y.o.-year-old female who was initially admitted on 8/20/2023. Patient seen at the request of Dr. Maryellen Escudero:    Patient has a past medical history of prior MRSA infection, anxiety, alcohol abuse, history of emigdio-en-y gastric bypass, bipolar type 1, and drug abuse. She presents with a chief complaint of left hip surgical wound dehiscence. Patient had a left hip hemiarthroplasty on 7/19/2023. Wound staples were removed on 8/2/23. Patient states that wound was closed at time of staple removal. However, after about a week the incision started to separate and eventually started to drain. Her wound nurse measured an opening approximately 6 cm deep. The RN contacted orthopedic surgeon who sent patient to the emergency department for observation. Patient states that drainage has been seen from the wound which she describes as being milky and odorous.   Patient was previously on Bactrim for potential infection and failed to respond to making and evaluation. Examined and discussed with patient. Labs, medications, radiologic studies were reviewed with personal review of films  Large amounts of data were reviewed  Discussed with nursing Staff, Discharge planner  Infection Control and Prevention measures reviewed  All prior entries were reviewed  Administer medications as ordered  Prognosis: Guarded  Discharge planning reviewed  Follow up as outpatient.     Ricky Palacios MD.

## 2023-08-22 NOTE — CARE COORDINATION
Transitional planning    Writer to room to discuss d/c plan, goal is home, is current with [facility], follow for potential IV ATB needs on discharge, has used [facility] home infusion in the past, does not want to return to [facility].

## 2023-08-22 NOTE — OP NOTE
[facility] [place], [facility]                                OPERATIVE REPORT    PATIENT NAME: Shraddha Montero                  :        1980  MED REC NO:   [id number]                             ROOM:       0326  ACCOUNT NO:   [de-identified]                           ADMIT DATE: 2023  PROVIDER:     Elaina Traore    DATE OF PROCEDURE:  2023    PREOPERATIVE DIAGNOSES:  1. Left surgical hip site wound dehiscence. 2.  Left surgical site wound infection. POSTOPERATIVE DIAGNOSES:  1. Left surgical hip site wound dehiscence. 2.  Left surgical site wound infection. PROCEDURES:  1. Irrigation and excisional debridement of skin down to including the  muscle of 10 x 20 cm wound. 2.  Secondary wound closure 20 cm wound. 3.  Application of antibiotic eluting system, left hip. 4.  Application of negative pressure wound VAC, left hip. SURGEON:  Elaina Traore DO    ASSISTANTS:  Araceli Isidro DO, PGY-5    ANESTHESIA:  General.    ESTIMATED BLOOD LOSS:  50 mL. FLUIDS:  1300 mL crystalloid. COMPLICATIONS:  None. SPECIMENS:  None. IMPLANTS:  20 mL Stimulan beads with gentamicin and vancomycin. FINDINGS:  Seropurulent postsurgical wound dehiscence without  communication to the deep implants with wound measuring 20 x 10 cm. INDICATIONS:  This is a 76-year-old female who initially underwent left  hip hemiarthroplasty, performed by myself on 2023 from a fall. The patient was seen at a routine followup appointment where sutures  were removed without any issue. Dressing was applied. The patient was  discharged back to our nursing facility. The patient then states over  the past several days she noticed some drainage with some wound  dehiscence but since she stated she did tell the nursing staff but they  did not contact me for evaluation.   She was subsequently discharged home  where I received a phone call from her home nurse on Sunday evening  stating that she was having some issues with her wound, with a deep  tracking wound. I instructed the patient to go to the ER for  evaluation. Upon presenting she had a deep wound to her left hip from  her prior surgical site, was tracking very deeply. Discussed need for  operative intervention and irrigation and debridement with closure. The  patient was amenable to this. Consent was obtained and placed in chart. All questions were answered appropriately. Surgical risks including but  not limited to bleeding; blood clots; infection; damage to nearby  tissues, vessels, and nerves; wound healing complications; failed  procedure; stiffness; loss of motion; anesthesia risk; loss of limb;  loss of life were all discussed with the patient. Knowing these risks,  the patient wished to proceed with surgery as indicated. OPERATIVE PROCEDURE:  The patient was taken to the operative suite,  placed under general anesthesia without any complications. Preoperative  antibiotics were given prior to the procedure. The patient was placed  in the lateral decubitus position. Axillary roll was placed. All bony  prominences were well padded. The patient was adequately secured to the  operative table. At this time, all team members paused to identify  proper patient name, indication, site, and allergies. All team members  were in agreeance. The left lower extremity was then prepped and draped  in normal sterile fashion. The postsurgical wound was extended to the  full extent of the wounds and fully visualize and debride the area. The  total area of the wound was 10 x 20 cm. We used a knife to sharply  debride the skin edges all the way down to the muscle. There was some  serous purulent discharge noted throughout. We were able to palpate the  fracture which was intact, did not track down to the deep implants.    Once we had good adequate

## 2023-08-22 NOTE — PROGRESS NOTES
[facility]  Internal Medicine Teaching Residency Program  Inpatient Daily Progress Note  ______________________________________________________________________________    Patient: Cris Quiroz  YOB: 1980   IWR:[id number]    Acct: [de-identified]     Room: [address] [place] [address]  Admit date: 8/20/2023  Today's date: 08/22/23  Number of days in the hospital: 2    SUBJECTIVE   Admitting Diagnosis: Abscess of left hip  CC: left hip surgical site dehiscence     - Pt examined at bedside. Chart & results reviewed. - Patient had washout yesterday with ortho   - continuing cefazolin per ID   - pain is improving   - hemodynamically stable, afebrile     Review of Systems   Constitutional:  Negative for chills and fever. Respiratory:  Negative for cough and shortness of breath. Cardiovascular:  Negative for chest pain, palpitations and leg swelling. Gastrointestinal:  Negative for abdominal distention, blood in stool, diarrhea and nausea. Musculoskeletal:  Negative for myalgias. Skin:  Positive for wound. Neurological:  Negative for headaches. BRIEF HISTORY     The patient is a pleasant 39 y.o. female with PMH of past MRSA infection, anxiety, alcohol abuse, hx of emigdio-en-y, bipolar type 1, and drug abuse presents with a chief complaint of left hip surgical wound dehiscence. The patient had a left hip hemiarthroplast 7/19/23. The patient's states that she has surgical sutures removed 8/2. After the sutures came out the wound stayed relatively intact for a weeks or so, but a few days ago began to separate. Significant drainage from wound has been noted expelling from the wound of the past week with some being milky. She did receive some bactrim from her facility for a few days. ED course  Ortho consulted from ED. Ortho wanting to perform surgery this week to wash out wound and reclose. Wound swaps obtained. CRP and sed rate increased.  WBC normal. LA normal. aureus infection  Resolved Problems:    * No resolved hospital problems. *     Abscess of left hip  Surgical wound dehiscence  - Left hip hemiarthroplasty conducted last month  - Washout and debridement conducted yesterday   - Antibiotic beads placed  - Wound vac in place   - Wound growing MSSA  - ID on board- continue cefazolin        Antiphospholipid syndrome (HCC)  - Will continue fondaparinux from home        Obesity, Class III, BMI 40-49.9 (morbid obesity)   - History of emigdio-en-y   - History of T2DM       Bipolar depression ([address])  - Continue patient's fluoxetine      Polysubstance abuse  - Patient has extensive history noted in chart of overdoses and drug abuse   - No signs of withdrawal noted      DVT ppx: fondaparinux   GI ppx: none      PT/OT/SW: evaluated   Discharge Planning: Likely home with Denisha Colon MD  Internal Medicine Resident, PGY-1  [facility]; [place], [place]  8/22/2023, 12:36 PM     Attestation and add on       I have discussed the care of Dede Grullon , including pertinent history and exam findings,      8/22/23    with the resident. I have seen and examined the patient and the key elements of all parts of the encounter have been performed by me . I agree with the assessment, plan and orders as documented by the resident. MD MASON Zamudio[address], [address] [place] [address].    Phone [phone]   Fax: [phone]  Answering Service: [phone]

## 2023-08-22 NOTE — PLAN OF CARE
Problem: Discharge Planning  Goal: Discharge to home or other facility with appropriate resources  8/22/2023 0001 by Cesar Stallings RN  Outcome: Progressing  8/21/2023 1833 by Janelle Ferguson RN  Outcome: Progressing     Problem: Pain  Goal: Verbalizes/displays adequate comfort level or baseline comfort level  8/22/2023 0001 by Cesar Stallings RN  Outcome: Progressing  8/21/2023 1833 by Janelle Ferguson RN  Outcome: Progressing     Problem: ABCDS Injury Assessment  Goal: Absence of physical injury  8/22/2023 0001 by Cesar Stallings RN  Outcome: Progressing  8/21/2023 1833 by Janelle Ferguson RN  Outcome: Progressing     Problem: Skin/Tissue Integrity  Goal: Absence of new skin breakdown  Description: 1. Monitor for areas of redness and/or skin breakdown  2. Assess vascular access sites hourly  3. Every 4-6 hours minimum:  Change oxygen saturation probe site  4. Every 4-6 hours:  If on nasal continuous positive airway pressure, respiratory therapy assess nares and determine need for appliance change or resting period.   8/22/2023 0001 by Cesar Stallings RN  Outcome: Progressing  8/21/2023 1833 by Janelle Ferguson RN  Outcome: Progressing     Problem: Safety - Adult  Goal: Free from fall injury  8/22/2023 0001 by Cesar Stallings RN  Outcome: Progressing  8/21/2023 1833 by Janelle Ferguson RN  Outcome: Progressing     Problem: Chronic Conditions and Co-morbidities  Goal: Patient's chronic conditions and co-morbidity symptoms are monitored and maintained or improved  8/22/2023 0001 by Cesar Stallings RN  Outcome: Progressing  8/21/2023 1833 by Janelle Ferguson RN  Outcome: Progressing

## 2023-08-23 LAB
ANION GAP SERPL CALCULATED.3IONS-SCNC: 9 MMOL/L (ref 9–17)
BASOPHILS # BLD: 0.05 K/UL (ref 0–0.2)
BASOPHILS NFR BLD: 1 % (ref 0–2)
BUN SERPL-MCNC: 5 MG/DL (ref 6–20)
CALCIUM SERPL-MCNC: 8 MG/DL (ref 8.6–10.4)
CHLORIDE SERPL-SCNC: 109 MMOL/L (ref 98–107)
CO2 SERPL-SCNC: 21 MMOL/L (ref 20–31)
CREAT SERPL-MCNC: 0.4 MG/DL (ref 0.5–0.9)
EOSINOPHIL # BLD: 0.06 K/UL (ref 0–0.44)
EOSINOPHILS RELATIVE PERCENT: 1 % (ref 1–4)
ERYTHROCYTE [DISTWIDTH] IN BLOOD BY AUTOMATED COUNT: 13.6 % (ref 11.8–14.4)
GFR SERPL CREATININE-BSD FRML MDRD: >60 ML/MIN/1.73M2
GLUCOSE BLD-MCNC: 138 MG/DL (ref 65–105)
GLUCOSE BLD-MCNC: 168 MG/DL (ref 65–105)
GLUCOSE BLD-MCNC: 230 MG/DL (ref 65–105)
GLUCOSE BLD-MCNC: 88 MG/DL (ref 65–105)
GLUCOSE SERPL-MCNC: 110 MG/DL (ref 70–99)
HCT VFR BLD AUTO: 29.9 % (ref 36.3–47.1)
HGB BLD-MCNC: 9.1 G/DL (ref 11.9–15.1)
IMM GRANULOCYTES # BLD AUTO: 0.05 K/UL (ref 0–0.3)
IMM GRANULOCYTES NFR BLD: 1 %
LYMPHOCYTES NFR BLD: 1.35 K/UL (ref 1.1–3.7)
LYMPHOCYTES RELATIVE PERCENT: 16 % (ref 24–43)
MCH RBC QN AUTO: 31.9 PG (ref 25.2–33.5)
MCHC RBC AUTO-ENTMCNC: 30.4 G/DL (ref 28.4–34.8)
MCV RBC AUTO: 104.9 FL (ref 82.6–102.9)
MONOCYTES NFR BLD: 0.63 K/UL (ref 0.1–1.2)
MONOCYTES NFR BLD: 7 % (ref 3–12)
NEUTROPHILS NFR BLD: 75 % (ref 36–65)
NEUTS SEG NFR BLD: 6.34 K/UL (ref 1.5–8.1)
NRBC BLD-RTO: 0 PER 100 WBC
PLATELET # BLD AUTO: 426 K/UL (ref 138–453)
PMV BLD AUTO: 11.2 FL (ref 8.1–13.5)
POTASSIUM SERPL-SCNC: 4.3 MMOL/L (ref 3.7–5.3)
RBC # BLD AUTO: 2.85 M/UL (ref 3.95–5.11)
RBC # BLD: ABNORMAL 10*6/UL
SODIUM SERPL-SCNC: 139 MMOL/L (ref 135–144)
WBC OTHER # BLD: 8.5 K/UL (ref 3.5–11.3)

## 2023-08-23 PROCEDURE — 1200000000 HC SEMI PRIVATE

## 2023-08-23 PROCEDURE — 2580000003 HC RX 258

## 2023-08-23 PROCEDURE — 99232 SBSQ HOSP IP/OBS MODERATE 35: CPT | Performed by: INTERNAL MEDICINE

## 2023-08-23 PROCEDURE — 85025 COMPLETE CBC W/AUTO DIFF WBC: CPT

## 2023-08-23 PROCEDURE — 36569 INSJ PICC 5 YR+ W/O IMAGING: CPT

## 2023-08-23 PROCEDURE — 76937 US GUIDE VASCULAR ACCESS: CPT

## 2023-08-23 PROCEDURE — 2580000003 HC RX 258: Performed by: ORTHOPAEDIC SURGERY

## 2023-08-23 PROCEDURE — 02HV33Z INSERTION OF INFUSION DEVICE INTO SUPERIOR VENA CAVA, PERCUTANEOUS APPROACH: ICD-10-PCS | Performed by: INTERNAL MEDICINE

## 2023-08-23 PROCEDURE — 6360000002 HC RX W HCPCS: Performed by: INTERNAL MEDICINE

## 2023-08-23 PROCEDURE — 6360000002 HC RX W HCPCS: Performed by: ORTHOPAEDIC SURGERY

## 2023-08-23 PROCEDURE — 82947 ASSAY GLUCOSE BLOOD QUANT: CPT

## 2023-08-23 PROCEDURE — 6370000000 HC RX 637 (ALT 250 FOR IP): Performed by: ORTHOPAEDIC SURGERY

## 2023-08-23 PROCEDURE — 2709999900 HC NON-CHARGEABLE SUPPLY

## 2023-08-23 PROCEDURE — 6360000002 HC RX W HCPCS

## 2023-08-23 PROCEDURE — C1751 CATH, INF, PER/CENT/MIDLINE: HCPCS

## 2023-08-23 PROCEDURE — 80048 BASIC METABOLIC PNL TOTAL CA: CPT

## 2023-08-23 PROCEDURE — 36415 COLL VENOUS BLD VENIPUNCTURE: CPT

## 2023-08-23 RX ORDER — SODIUM CHLORIDE 0.9 % (FLUSH) 0.9 %
5-40 SYRINGE (ML) INJECTION PRN
Status: DISCONTINUED | OUTPATIENT
Start: 2023-08-23 | End: 2023-08-26 | Stop reason: HOSPADM

## 2023-08-23 RX ORDER — SODIUM CHLORIDE 0.9 % (FLUSH) 0.9 %
5-40 SYRINGE (ML) INJECTION EVERY 12 HOURS SCHEDULED
Status: DISCONTINUED | OUTPATIENT
Start: 2023-08-23 | End: 2023-08-26 | Stop reason: HOSPADM

## 2023-08-23 RX ORDER — LIDOCAINE HYDROCHLORIDE 10 MG/ML
5 INJECTION, SOLUTION EPIDURAL; INFILTRATION; INTRACAUDAL; PERINEURAL ONCE
Status: DISCONTINUED | OUTPATIENT
Start: 2023-08-23 | End: 2023-08-26 | Stop reason: HOSPADM

## 2023-08-23 RX ORDER — SODIUM CHLORIDE 9 MG/ML
25 INJECTION, SOLUTION INTRAVENOUS PRN
Status: DISCONTINUED | OUTPATIENT
Start: 2023-08-23 | End: 2023-08-26 | Stop reason: HOSPADM

## 2023-08-23 RX ADMIN — MORPHINE SULFATE 2 MG: 2 INJECTION, SOLUTION INTRAMUSCULAR; INTRAVENOUS at 02:37

## 2023-08-23 RX ADMIN — Medication 2000 MG: at 12:38

## 2023-08-23 RX ADMIN — MORPHINE SULFATE 2 MG: 2 INJECTION, SOLUTION INTRAMUSCULAR; INTRAVENOUS at 22:28

## 2023-08-23 RX ADMIN — PRAVASTATIN SODIUM 40 MG: 20 TABLET ORAL at 22:28

## 2023-08-23 RX ADMIN — SODIUM CHLORIDE, PRESERVATIVE FREE 10 ML: 5 INJECTION INTRAVENOUS at 20:09

## 2023-08-23 RX ADMIN — FLUOXETINE HYDROCHLORIDE 40 MG: 20 CAPSULE ORAL at 09:26

## 2023-08-23 RX ADMIN — MORPHINE SULFATE 2 MG: 2 INJECTION, SOLUTION INTRAMUSCULAR; INTRAVENOUS at 10:16

## 2023-08-23 RX ADMIN — SODIUM CHLORIDE, PRESERVATIVE FREE 10 ML: 5 INJECTION INTRAVENOUS at 04:29

## 2023-08-23 RX ADMIN — MORPHINE SULFATE 2 MG: 2 INJECTION, SOLUTION INTRAMUSCULAR; INTRAVENOUS at 18:23

## 2023-08-23 RX ADMIN — SODIUM CHLORIDE, PRESERVATIVE FREE 10 ML: 5 INJECTION INTRAVENOUS at 06:06

## 2023-08-23 RX ADMIN — Medication 2000 MG: at 04:29

## 2023-08-23 RX ADMIN — Medication 2000 MG: at 20:09

## 2023-08-23 RX ADMIN — MORPHINE SULFATE 2 MG: 2 INJECTION, SOLUTION INTRAMUSCULAR; INTRAVENOUS at 06:06

## 2023-08-23 RX ADMIN — SODIUM CHLORIDE, PRESERVATIVE FREE 10 ML: 5 INJECTION INTRAVENOUS at 02:36

## 2023-08-23 RX ADMIN — HYDROMORPHONE HYDROCHLORIDE 0.25 MG: 1 INJECTION, SOLUTION INTRAMUSCULAR; INTRAVENOUS; SUBCUTANEOUS at 07:44

## 2023-08-23 RX ADMIN — MORPHINE SULFATE 2 MG: 2 INJECTION, SOLUTION INTRAMUSCULAR; INTRAVENOUS at 14:20

## 2023-08-23 RX ADMIN — FONDAPARINUX SODIUM 10 MG: 5 INJECTION, SOLUTION SUBCUTANEOUS at 09:26

## 2023-08-23 ASSESSMENT — PAIN DESCRIPTION - LOCATION
LOCATION: HIP

## 2023-08-23 ASSESSMENT — PAIN DESCRIPTION - ORIENTATION
ORIENTATION: LEFT

## 2023-08-23 ASSESSMENT — PAIN SCALES - GENERAL
PAINLEVEL_OUTOF10: 7

## 2023-08-23 ASSESSMENT — PAIN DESCRIPTION - DESCRIPTORS
DESCRIPTORS: DISCOMFORT

## 2023-08-23 NOTE — PROGRESS NOTES
[facility] - Progress Note  Today's Date and Time: 8/23/2023, 10:13 AM    Impression :   Left hip abscess/surgical wound dehiscence. MSSA  S/p left hip cecilio arthroplasty 7-19-23    Recommendations:   D/C Ceftaroline as MRSA is excluded  Cefazolin 2 gm IV q 8 Hr . Stop date 9-21-23 for Lt hip MSSA abscess  She can be switched to Ceftriaxone 2 gm IV q day at time of discharge, until 9-21-23. Medical Decision Making/Summary/Discussion:8/23/2023       Infection Control Recommendations   [place] Precautions  Contact isolation    Antimicrobial Stewardship Recommendations     Simplification of therapy  Targeted therapy    Coordination of Outpatient Care:   Estimated Length of IV antimicrobials: 9-21-23  Patient will need Midline Catheter Insertion: Yes  Patient will need PICC line Insertion:TBD  Patient will need: Home IV , [address] [place],  SNF,  LTAC: TBD  Patient will need outpatient wound care: Yes    Chief complaint/reason for consultation:   Left hip abscess      History of Present Illness:   Lory Arechiga is a 39y.o.-year-old female who was initially admitted on 8/20/2023. Patient seen at the request of Dr. Jesus Sears:    Patient has a past medical history of prior MRSA infection, anxiety, alcohol abuse, history of emigdio-en-y gastric bypass, bipolar type 1, and drug abuse. She presents with a chief complaint of left hip surgical wound dehiscence. Patient had a left hip hemiarthroplasty on 7/19/2023. Wound staples were removed on 8/2/23. Patient states that wound was closed at time of staple removal. However, after about a week the incision started to separate and eventually started to drain. Her wound nurse measured an opening approximately 6 cm deep. The RN contacted orthopedic surgeon who sent patient to the emergency department for observation.       Patient states that drainage has been seen from the wound which she describes as being

## 2023-08-23 NOTE — PROGRESS NOTES
[facility]  Internal Medicine Teaching Residency Program  Inpatient Daily Progress Note  ______________________________________________________________________________    Patient: Cecelia Vang  YOB: 1980   SMU:[id number]    Acct: [de-identified]     Room: [address] [place] [address]  Admit date: 8/20/2023  Today's date: 08/23/23  Number of days in the hospital: 3    SUBJECTIVE   Admitting Diagnosis: Abscess of left hip  CC: left hip surgical site dehiscence     - Pt examined at bedside. Chart & results reviewed. -  hip pain is severe with movement, but moderate while laying in bed  - She is trying to walk as much as she can tolerate  - Requesting to stay one more day due to her  also being in the hospital, so she would have little help at home   - Made aware of ortho appointment      Review of Systems   Constitutional:  Negative for chills and fever. Respiratory:  Negative for cough and shortness of breath. Cardiovascular:  Negative for chest pain, palpitations and leg swelling. Gastrointestinal:  Negative for abdominal distention, blood in stool, diarrhea and nausea. Musculoskeletal:  Negative for myalgias. Skin:  Positive for wound. Neurological:  Negative for headaches. BRIEF HISTORY     The patient is a pleasant 39 y.o. female with PMH of past MRSA infection, anxiety, alcohol abuse, hx of emigdio-en-y, bipolar type 1, and drug abuse presents with a chief complaint of left hip surgical wound dehiscence. The patient had a left hip hemiarthroplast 7/19/23. The patient's states that she has surgical sutures removed 8/2. After the sutures came out the wound stayed relatively intact for a weeks or so, but a few days ago began to separate. Significant drainage from wound has been noted expelling from the wound of the past week with some being milky. She did receive some bactrim from her facility for a few days. ED course  Ortho consulted from ED.

## 2023-08-23 NOTE — PROCEDURES
History/labs/allergies reviewed  PICC ordered by Infectious Disease for long term IV antibiotics at discharge    Benefits include stable long term intravenous access. Risks include failure to obtain the desired result(s) of the procedure, discomfort, injury, the need for additional therapies, permanent loss of body function, bleeding from the site, arterial puncture, air embolism, nerve damage, hematoma, phlebitis, catheter fracture/rupture, catheter embolism, catheter occlusion, catheter migration, catheter site infection, unintentional/accidental removal of catheter, bloodstream infection, infiltration, cardiac arrhythmia, vein thrombosis, difficult catheter removal, pleural effusion, pericardial effusion/cardiac tamponade, and death. Alternatives discussed including centrally inserted central catheter, as well as less invasive procedures such as multiple peripheral IVs, extended dwell catheters and midline placement    Consent signed and obtained by proceduralist   Placed by LUISANA elias  Assisted by LUISANA elias  Time out performed using two identifiers  Catheter type single lumen picc  Product type Bard 4 Fr single lumen PowerPicc w/ dlx rhythm  Lot # [id number]  Expiration date 2/29/2024  Catheter size 4  English  Trimmed at 46 cm  Total length inserted 44 cm  External catheter length 2 cm  Location Left basilic vein 9.25 cm CVR 8.6%  Number of attempts 1  Estimated blood loss 1 ml  Pre procedure cardiac Rhythm normal sinus rhythm per bedside telemetry and dlx rhythm  Placement verified by- DLX rhythm Max P wave noted by amplitude changes of the P wave, positive blood return, flushes easily  Special equipment used- ultrasound, micro-introducer technique and DLX rhythm  Catheter secured with 3M securement device and secureport IV liquid adhesive applied to insertion site  Dressing applied- Tegaderm CHG  Lidocaine administered intradermally conc. 1% 2 mL  PICC line education:   [ x ] Discussed with

## 2023-08-23 NOTE — CARE COORDINATION
Wants SNF. [place] of choice is provided, choices are made  and  referrals are made. Post Acute Facility/Agency List     Provided patient with the following list, the list includes the overall star ratings obtained from CMS per the Medicare Web site (www.Medicare.gov):     [] [address]  [] Acute Inpatient Rehabilitation Facilities  [x] Skilled Nursing Facilities  [] Home Care    Provided verbal instructions on how to utilize the QR Code to obtain additional detailed star ratings from www. Medicare. gov     offered to print and provide the detailed list:    []Accepted   [x]Declined

## 2023-08-23 NOTE — PLAN OF CARE
Problem: Discharge Planning  Goal: Discharge to home or other facility with appropriate resources  8/23/2023 0033 by Riley Gillis RN  Outcome: Progressing  8/22/2023 1624 by Lauryn Robles RN  Outcome: Progressing     Problem: Pain  Goal: Verbalizes/displays adequate comfort level or baseline comfort level  8/23/2023 0033 by Riley Gillis RN  Outcome: Progressing  8/22/2023 1624 by Lauryn Robles RN  Outcome: Progressing     Problem: ABCDS Injury Assessment  Goal: Absence of physical injury  8/23/2023 0033 by Riley Gillis RN  Outcome: Progressing  8/22/2023 1624 by Lauryn Robles RN  Outcome: Progressing     Problem: Skin/Tissue Integrity  Goal: Absence of new skin breakdown  Description: 1. Monitor for areas of redness and/or skin breakdown  2. Assess vascular access sites hourly  3. Every 4-6 hours minimum:  Change oxygen saturation probe site  4. Every 4-6 hours:  If on nasal continuous positive airway pressure, respiratory therapy assess nares and determine need for appliance change or resting period.   Outcome: Progressing     Problem: Safety - Adult  Goal: Free from fall injury  Outcome: Progressing     Problem: Chronic Conditions and Co-morbidities  Goal: Patient's chronic conditions and co-morbidity symptoms are monitored and maintained or improved  Outcome: Progressing

## 2023-08-23 NOTE — PLAN OF CARE
Attestation and add on       I have discussed the care of Yeyo Morales , including pertinent history and exam findings,      8/22/23    with the resident. I have seen and examined the patient and the key elements of all parts of the encounter have been performed by me . I agree with the assessment, plan and orders as documented by the resident. Hospital Problems             Last Modified POA    * (Principal) Abscess of left hip 8/20/2023 Yes    Antiphospholipid syndrome ([address]) 8/21/2023 Yes    Obesity, Class III, BMI 40-49.9 (morbid obesity) ([address]) 8/21/2023 Yes    Bipolar depression ([address]) 8/21/2023 Yes    Surgical wound infection 8/22/2023 Yes    Staph aureus infection 8/22/2023 Yes           --s/p drainage , improving-- ;  Improving  Will be going to home with IV antibiotics  Plan discharge tomorrow        Medications: Allergies: Allergies   Allergen Reactions    Aspirin      Patient is on chronic anticoagulation  Patient is on chronic anticoagulation  Patient is on chronic anticoagulation    Betadine [Povidone Iodine]     Celexa [Citalopram Hydrobromide]     Citalopram Itching, Rash and Swelling     Other reaction(s): Unknown    Furosemide      Tolerates Bumex  Other reaction(s): Unknown    Nitrofurantoin      Other reaction(s): Unknown    Aripiprazole Itching and Swelling     Other reaction(s):  Other  Abilify    Codeine Rash and Hives     Other reaction(s): Unknown    Lithium Nausea And Vomiting     Other reaction(s): Unknown    Morphine Itching and Other (See Comments)     Other reaction(s): GI Intolerance, Other (See Comments)    Paroxetine Rash and Hives     Other reaction(s): Unknown  Paxil    Paxil [Paroxetine Hcl] Rash    Povidone Iodine Rash     Other reaction(s): Unknown    Sertraline Hives and Rash    Tape [place] Tape] Rash     Paper tape    Tegretol [Carbamazepine] Rash       Current Meds:   Scheduled Meds:    ceFAZolin  2,000 mg IntraVENous Q8H    sodium chloride flush  5-40 mL IntraVENous 2 times per day    insulin lispro  0-8 Units SubCUTAneous TID WC    insulin lispro  0-4 Units SubCUTAneous Nightly    fondaparinux  10 mg SubCUTAneous Daily    FLUoxetine  40 mg Oral Daily    pravastatin  40 mg Oral Nightly     Continuous Infusions:    sodium chloride      sodium chloride Stopped (08/23/23 0610)    dextrose       PRN Meds: sodium chloride flush, sodium chloride, ondansetron **OR** ondansetron, polyethylene glycol, acetaminophen **OR** acetaminophen, glucose, dextrose bolus **OR** dextrose bolus, glucagon (rDNA), dextrose, pantoprazole, midodrine, morphine      Jon Jaymomendoza  [facility], [address]    Phone [phone]   Fax: [phone]  Answering Service: [phone]

## 2023-08-23 NOTE — PROGRESS NOTES
Physical Therapy  Facility/Department: [facility] RENAL//MED SURG  Daily Treatment Note    Name: Fawn Garcia  : 1980  MRN: [id number]  Date of Service: 2023    Discharge Recommendations:  Patient would benefit from continued therapy after discharge          Patient Diagnosis(es): The primary encounter diagnosis was Surgical wound infection. Diagnoses of Left hip pain and Dehiscence of operative wound, sequela were also pertinent to this visit. Past Medical History:  has a past medical history of Alcohol abuse, Anxiety, Arthritis, Painting esophagus, Benzodiazepine overdose, Bipolar disorder, unspecified ([address]), Bipolar I disorder, most recent episode depressed, severe without psychotic features ([address]), Cellulitis, Chronic abdominal wound infection, Constipation, Depression, Drug overdose, intentional ([address]), Genital herpes, unspecified, GERD (gastroesophageal reflux disease), History of pulmonary embolism, Hx of blood clots, Hypertension, Iron deficiency anemia, Isopropyl alcohol poisoning, Lumbosacral spondylosis without myelopathy, MDRO (multiple drug resistant organisms) resistance, Miscarriage, Morbid obesity ([address]), MRSA (methicillin resistant staph aureus) culture positive, Overdose of benzodiazepine, Patient in clinical research study, Pernicious anemia, Primary hypercoagulable state ([address]), Pulmonary embolism ([address]), Suicidal behavior, Suicidal ideation, Suicide attempt ([address]), SVT (supraventricular tachycardia) ([address]), Toxic effect of ethanol, intentional self-harm ([address]), and Type 2 diabetes mellitus, with long-term current use of insulin ([address]). Past Surgical History:  has a past surgical history that includes Cholecystectomy; Liver Resection; hernia repair; Tonsillectomy; Endoscopy, colon, diagnostic; Abdominal hernia repair (); Abdominal hernia repair (2014); Bariatric Surgery (); Dilation and curettage of uterus ();  Finger amputation (Left, 2015); lymph node biopsy (); Modifier : CL (08/23/23 5971)    Goals  Short Term Goals  Time Frame for Short Term Goals: 10 visits  Short Term Goal 1: transfers with SBA  Short Term Goal 2: amb 125 ft with a RW x SBA with WBAT L LE  Short Term Goal 3: ascend/descend 4 steps with SBA  Short Term Goal 4: 20 min strengthening exercise program x SBA       Education  Patient Education  Education Given To: Patient  Education Provided: Role of Therapy;Plan of Care;Transfer Training  Education Method: Demonstration;Verbal  Barriers to Learning: None  Education Outcome: Verbalized understanding;Demonstrated understanding      Therapy Time   Individual Concurrent Group Co-treatment   Time In 1111         Time Out 1155         Minutes 44         Timed Code Treatment Minutes: [address] [place], [place]

## 2023-08-24 LAB
ANION GAP SERPL CALCULATED.3IONS-SCNC: 7 MMOL/L (ref 9–17)
BASOPHILS # BLD: 0.04 K/UL (ref 0–0.2)
BASOPHILS NFR BLD: 1 % (ref 0–2)
BUN SERPL-MCNC: 6 MG/DL (ref 6–20)
CALCIUM SERPL-MCNC: 8.2 MG/DL (ref 8.6–10.4)
CHLORIDE SERPL-SCNC: 107 MMOL/L (ref 98–107)
CO2 SERPL-SCNC: 24 MMOL/L (ref 20–31)
CREAT SERPL-MCNC: 0.4 MG/DL (ref 0.5–0.9)
CRP SERPL HS-MCNC: 136.8 MG/L (ref 0–5)
EOSINOPHIL # BLD: <0.03 K/UL (ref 0–0.44)
EOSINOPHILS RELATIVE PERCENT: 0 % (ref 1–4)
ERYTHROCYTE [DISTWIDTH] IN BLOOD BY AUTOMATED COUNT: 13.6 % (ref 11.8–14.4)
GFR SERPL CREATININE-BSD FRML MDRD: >60 ML/MIN/1.73M2
GLUCOSE BLD-MCNC: 102 MG/DL (ref 65–105)
GLUCOSE BLD-MCNC: 134 MG/DL (ref 65–105)
GLUCOSE BLD-MCNC: 143 MG/DL (ref 65–105)
GLUCOSE BLD-MCNC: 145 MG/DL (ref 65–105)
GLUCOSE SERPL-MCNC: 110 MG/DL (ref 70–99)
HCT VFR BLD AUTO: 33.6 % (ref 36.3–47.1)
HGB BLD-MCNC: 11.3 G/DL (ref 11.9–15.1)
IMM GRANULOCYTES # BLD AUTO: 0.04 K/UL (ref 0–0.3)
IMM GRANULOCYTES NFR BLD: 1 %
LYMPHOCYTES NFR BLD: 0.97 K/UL (ref 1.1–3.7)
LYMPHOCYTES RELATIVE PERCENT: 11 % (ref 24–43)
MCH RBC QN AUTO: 32.4 PG (ref 25.2–33.5)
MCHC RBC AUTO-ENTMCNC: 33.6 G/DL (ref 28.4–34.8)
MCV RBC AUTO: 96.3 FL (ref 82.6–102.9)
MONOCYTES NFR BLD: 0.72 K/UL (ref 0.1–1.2)
MONOCYTES NFR BLD: 8 % (ref 3–12)
NEUTROPHILS NFR BLD: 79 % (ref 36–65)
NEUTS SEG NFR BLD: 6.84 K/UL (ref 1.5–8.1)
NRBC BLD-RTO: 0 PER 100 WBC
PLATELET # BLD AUTO: 358 K/UL (ref 138–453)
PMV BLD AUTO: 10.5 FL (ref 8.1–13.5)
POTASSIUM SERPL-SCNC: 4.3 MMOL/L (ref 3.7–5.3)
RBC # BLD AUTO: 3.49 M/UL (ref 3.95–5.11)
REASON FOR REJECTION: NORMAL
SODIUM SERPL-SCNC: 138 MMOL/L (ref 135–144)
SPECIMEN SOURCE: NORMAL
WBC OTHER # BLD: 8.9 K/UL (ref 3.5–11.3)
ZZ NTE CLEAN UP: ORDERED TEST: NORMAL

## 2023-08-24 PROCEDURE — 6360000002 HC RX W HCPCS: Performed by: ORTHOPAEDIC SURGERY

## 2023-08-24 PROCEDURE — 1200000000 HC SEMI PRIVATE

## 2023-08-24 PROCEDURE — 97530 THERAPEUTIC ACTIVITIES: CPT

## 2023-08-24 PROCEDURE — 99232 SBSQ HOSP IP/OBS MODERATE 35: CPT | Performed by: INTERNAL MEDICINE

## 2023-08-24 PROCEDURE — 97535 SELF CARE MNGMENT TRAINING: CPT

## 2023-08-24 PROCEDURE — 36415 COLL VENOUS BLD VENIPUNCTURE: CPT

## 2023-08-24 PROCEDURE — 80048 BASIC METABOLIC PNL TOTAL CA: CPT

## 2023-08-24 PROCEDURE — 6370000000 HC RX 637 (ALT 250 FOR IP): Performed by: ORTHOPAEDIC SURGERY

## 2023-08-24 PROCEDURE — 82947 ASSAY GLUCOSE BLOOD QUANT: CPT

## 2023-08-24 PROCEDURE — 2580000003 HC RX 258: Performed by: ORTHOPAEDIC SURGERY

## 2023-08-24 PROCEDURE — 6360000002 HC RX W HCPCS: Performed by: INTERNAL MEDICINE

## 2023-08-24 PROCEDURE — 86140 C-REACTIVE PROTEIN: CPT

## 2023-08-24 PROCEDURE — 85025 COMPLETE CBC W/AUTO DIFF WBC: CPT

## 2023-08-24 PROCEDURE — 97116 GAIT TRAINING THERAPY: CPT

## 2023-08-24 PROCEDURE — 97110 THERAPEUTIC EXERCISES: CPT

## 2023-08-24 RX ADMIN — Medication 2000 MG: at 13:02

## 2023-08-24 RX ADMIN — MORPHINE SULFATE 2 MG: 2 INJECTION, SOLUTION INTRAMUSCULAR; INTRAVENOUS at 17:46

## 2023-08-24 RX ADMIN — MORPHINE SULFATE 2 MG: 2 INJECTION, SOLUTION INTRAMUSCULAR; INTRAVENOUS at 09:36

## 2023-08-24 RX ADMIN — MORPHINE SULFATE 2 MG: 2 INJECTION, SOLUTION INTRAMUSCULAR; INTRAVENOUS at 13:34

## 2023-08-24 RX ADMIN — MORPHINE SULFATE 2 MG: 2 INJECTION, SOLUTION INTRAMUSCULAR; INTRAVENOUS at 02:18

## 2023-08-24 RX ADMIN — FLUOXETINE HYDROCHLORIDE 40 MG: 20 CAPSULE ORAL at 08:35

## 2023-08-24 RX ADMIN — FONDAPARINUX SODIUM 10 MG: 5 INJECTION, SOLUTION SUBCUTANEOUS at 08:35

## 2023-08-24 RX ADMIN — MORPHINE SULFATE 2 MG: 2 INJECTION, SOLUTION INTRAMUSCULAR; INTRAVENOUS at 05:26

## 2023-08-24 RX ADMIN — MORPHINE SULFATE 2 MG: 2 INJECTION, SOLUTION INTRAMUSCULAR; INTRAVENOUS at 22:19

## 2023-08-24 RX ADMIN — PRAVASTATIN SODIUM 40 MG: 20 TABLET ORAL at 20:09

## 2023-08-24 RX ADMIN — Medication 2000 MG: at 20:08

## 2023-08-24 RX ADMIN — ACETAMINOPHEN 650 MG: 325 TABLET ORAL at 20:09

## 2023-08-24 RX ADMIN — Medication 2000 MG: at 03:18

## 2023-08-24 RX ADMIN — SODIUM CHLORIDE: 9 INJECTION, SOLUTION INTRAVENOUS at 03:18

## 2023-08-24 ASSESSMENT — PAIN DESCRIPTION - DESCRIPTORS
DESCRIPTORS: DISCOMFORT

## 2023-08-24 ASSESSMENT — PAIN SCALES - GENERAL
PAINLEVEL_OUTOF10: 7
PAINLEVEL_OUTOF10: 8
PAINLEVEL_OUTOF10: 7
PAINLEVEL_OUTOF10: 7
PAINLEVEL_OUTOF10: 8
PAINLEVEL_OUTOF10: 7

## 2023-08-24 ASSESSMENT — PAIN DESCRIPTION - ORIENTATION
ORIENTATION: LEFT

## 2023-08-24 ASSESSMENT — PAIN DESCRIPTION - LOCATION
LOCATION: HIP

## 2023-08-24 NOTE — PROGRESS NOTES
Physical Therapy  Facility/Department: [facility] RENAL//MED SURG  Physical Therapy Daily Treatment Note    Name: Ruth Ann Andrews  : 1980  MRN: [id number]  Date of Service: 2023    Discharge Recommendations:  Patient would benefit from continued therapy after discharge   PT Equipment Recommendations  Equipment Needed: Yes  Mobility Devices: Merlin Andrea: Rolling      Patient Diagnosis(es): The primary encounter diagnosis was Surgical wound infection. Diagnoses of Left hip pain and Dehiscence of operative wound, sequela were also pertinent to this visit. Past Medical History:  has a past medical history of Alcohol abuse, Anxiety, Arthritis, Painting esophagus, Benzodiazepine overdose, Bipolar disorder, unspecified ([address]), Bipolar I disorder, most recent episode depressed, severe without psychotic features ([address]), Cellulitis, Chronic abdominal wound infection, Constipation, Depression, Drug overdose, intentional ([address]), Genital herpes, unspecified, GERD (gastroesophageal reflux disease), History of pulmonary embolism, Hx of blood clots, Hypertension, Iron deficiency anemia, Isopropyl alcohol poisoning, Lumbosacral spondylosis without myelopathy, MDRO (multiple drug resistant organisms) resistance, Miscarriage, Morbid obesity ([address]), MRSA (methicillin resistant staph aureus) culture positive, Overdose of benzodiazepine, Patient in clinical research study, Pernicious anemia, Primary hypercoagulable state ([address]), Pulmonary embolism ([address]), Suicidal behavior, Suicidal ideation, Suicide attempt ([address]), SVT (supraventricular tachycardia) ([address]), Toxic effect of ethanol, intentional self-harm ([address]), and Type 2 diabetes mellitus, with long-term current use of insulin ([address]). Past Surgical History:  has a past surgical history that includes Cholecystectomy; Liver Resection; hernia repair; Tonsillectomy; Endoscopy, colon, diagnostic; Abdominal hernia repair (); Abdominal hernia repair (2014);  Bariatric Surgery

## 2023-08-24 NOTE — PROGRESS NOTES
Patient seen and examined after being notified cannister full. Due to needed to be changed twice and notification that it was clogged. We elected to change to new unit. Patient tolerated well. Notification for suction to be clogged went off with new unit, this is like due to wound closure. So long as adequate suction maintained, we will continue to monitor output and please record each shift. Clog notification may persist. Two new cannisters left in room for patient to go home with and change as needed. More drainage may occur as patient continues to mobilize with PT and with nursing.

## 2023-08-24 NOTE — PROGRESS NOTES
PRN  sodium chloride, 25 mL, PRN  sodium chloride flush, 5-40 mL, PRN  sodium chloride, , PRN  ondansetron, 4 mg, Q8H PRN   Or  ondansetron, 4 mg, Q6H PRN  polyethylene glycol, 17 g, Daily PRN  acetaminophen, 650 mg, Q6H PRN   Or  acetaminophen, 650 mg, Q6H PRN  glucose, 4 tablet, PRN  dextrose bolus, 125 mL, PRN   Or  dextrose bolus, 250 mL, PRN  glucagon (rDNA), 1 mg, PRN  dextrose, , Continuous PRN  pantoprazole, 40 mg, Daily PRN  midodrine, 2.5 mg, BID PRN  morphine, 2 mg, Q4H PRN        Diagnostic Labs:  CBC:   Recent Labs     08/22/23  1013 08/23/23  0612   WBC 7.5 8.5   RBC 3.04* 2.85*   HGB 9.5* 9.1*   HCT 29.8* 29.9*   MCV 98.0 104.9*   RDW 13.7 13.6   * 426     BMP:   Recent Labs     08/22/23  1013 08/23/23  0612 08/24/23  0602    139 138   K 5.0 4.3 4.3    109* 107   CO2 22 21 24   BUN 5* 5* 6   CREATININE 0.4* 0.4* 0.4*     BNP: No results for input(s): BNP in the last 72 hours. PT/INR:   Recent Labs     08/21/23  0931   PROTIME 12.7   INR 1.0     APTT:   Recent Labs     08/21/23  0931   APTT 27.2     CARDIAC ENZYMES: No results for input(s): CKMB, CKMBINDEX, TROPONINI in the last 72 hours. Invalid input(s): CKTOTAL;3  FASTING LIPID PANEL:  Lab Results   Component Value Date    CHOL 74 04/18/2023    HDL 36 (L) 04/18/2023    TRIG 97 04/18/2023     LIVER PROFILE:   No results for input(s): AST, ALT, ALB, BILIDIR, BILITOT, ALKPHOS in the last 72 hours. MICROBIOLOGY:   Lab Results   Component Value Date/Time    CULTURE (A) 08/20/2023 08:32 PM     STAPHYLOCOCCUS AUREUS MODERATE GROWTH This isolate is methicillin susceptible.     CULTURE NORMAL SKIN LALY 08/20/2023 08:32 PM       Imaging:    XR HIP 2-3 VW W PELVIS LEFT    Result Date: 8/20/2023  EXAMINATION: ONE XRAY VIEW OF THE PELVIS AND 2 XRAY VIEWS LEFT HIP 8/20/2023 7:59 pm COMPARISON: Radiographs of left hip joint and pelvis on 8/7/2023 HISTORY: ORDERING SYSTEM PROVIDED HISTORY: concern for post operative infection TECHNOLOGIST PROVIDED HISTORY: Concern for post operative infection Reason for Exam: hip surgery 7/19/23,incision now red,painful,seeping FINDINGS: Redemonstration of intact left hip arthroplasty without any fracture or malalignment and without interval change. On rest of AP view of pelvis there is no fracture or malalignment. Right hip joint appears to be normal.  Evidence of moderate to marked multilevel spondylosis in the visualized lower lumbar spine and lumbosacral junction. Unchanged, intact left knee arthroplasty without fracture or malalignment. On rest of AP view of pelvis there is no acute process or change. ASSESSMENT & PLAN     This is a 39 y.o. female who presented with surgical wound dehiscence and found to have surgical wound infection. Principal Problem:    Abscess of left hip  Active Problems:    Antiphospholipid syndrome (HCC)    Obesity, Class III, BMI 40-49.9 (morbid obesity) ([address])    Bipolar depression ([address])    Surgical wound infection    Staph aureus infection  Resolved Problems:    * No resolved hospital problems.  *     Abscess of left hip  Surgical wound dehiscence  - Left hip hemiarthroplasty conducted last month  - Washout and debridement conducted yesterday   - Antibiotic beads placed  - Wound vac in place   - Wound growing MSSA  - Continue to manage pain   - ID on board- continue cefazolin, switch to rocephin on discharge   - PICC line placed        Antiphospholipid syndrome (HCC)  - Will continue fondaparinux from home        Obesity, Class III, BMI 40-49.9 (morbid obesity)   - History of emigdio-en-y   - History of T2DM       Bipolar depression ([address])  - Continue patient's fluoxetine      Polysubstance abuse  - Patient has extensive history noted in chart of overdoses and drug abuse   - No signs of withdrawal noted      DVT ppx: fondaparinux   GI ppx: none      PT/OT/SW: evaluated   Discharge Planning: home with [address] vs SNF     Joanne Martinez MD  Internal Medicine Resident, PGY-1  [facility]

## 2023-08-24 NOTE — CARE COORDINATION
All chosen SNFs have declined this patient. Patient is given the SNF list and is asked for more choices. Patient agrees and will notify writer with choice.

## 2023-08-24 NOTE — PROGRESS NOTES
training (appropriate RW placement and encouragement for progression of task; limited d/t pain)  Base of Support: Narrowed  Assistive Device: Walker, rolling;Gait belt (EOB > recliner; increased time and encouragement required throughout task)        ADL  UE Dressing: Increased time to complete;Setup;Verbal cueing;Stand by assistance  Additional Comments: Pt doffed/donned gown seated EOB at SBA for balance and verbal cues to complete task appropriately. Pt required encouragement throughout for participation in functional tasks        Bed mobility  Supine to Sit: Contact guard assistance  Scooting: Contact guard assistance  Bed Mobility Comments: increased time to complete with heavy use of B hand rails; with HOB elevated ~50 degrees; encouragement required throughout        Cognition  Overall Cognitive Status: Exceptions  Safety Judgement: Decreased awareness of need for assistance;Decreased awareness of need for safety  Problem Solving: Decreased awareness of errors     Education Provided: Role of Therapy; ADL Adaptive Strategies; Plan of Care;Transfer Training;Equipment; Fall Prevention Strategies;Precautions  Education Provided Comments: precautions, activity promotion - fair return  Education Method: Verbal;Demonstration  Barriers to Learning: None  Education Outcome: Continued education needed;Verbalized understanding  AM-PAC Score        AM-[facility] Inpatient Daily Activity Raw Score: 17 (08/24/23 1319)  AM-PAC Inpatient ADL T-Scale Score : 37.26 (08/24/23 1319)  ADL Inpatient CMS 0-100% Score: 50.11 (08/24/23 1319)  ADL Inpatient CMS G-Code Modifier : CK (08/24/23 1319)  Goals  Short Term Goals  Time Frame for Short Term Goals: By discharge, pt will:  Short Term Goal 1: Demo bed mobility sit<>supine with modified independence  Short Term Goal 2: Demo functional sit<>stand transfers and functional mobility with supervision and use of least restrictive AD PRN  Short Term Goal 3: Demo 5 minutes of standing tolerance with unilateral hand release off AD intermittently at SBA to promote increased independence throughout ADLs/IADLs  Short Term Goal 4: Demo UB ADLs with Mod IND  Short Term Goal 5: Demo LB ADLs/toileting with Min A, setup and use of DME/adaptive strategies PRN  Short Term Goal 6: Identify and demonstrate use of 2+ non-pharaceutical pain management strategies PRN throughout all functional ADLs to improve independence/participation     Therapy Time   Individual Concurrent Group Co-treatment   Time In 1131         Time Out 1212         Minutes 41         Timed Code Treatment Minutes: 34 Minutes     Justen Sahu OTR/L

## 2023-08-24 NOTE — PROGRESS NOTES
[facility] - Progress Note  Today's Date and Time: 8/24/2023, 8:49 AM    Impression :   Left hip abscess/surgical wound dehiscence. MSSA  S/p left hip cecilio arthroplasty 7-19-23    Recommendations:   D/C Ceftaroline as MRSA is excluded  Cefazolin 2 gm IV q 8 Hr . Stop date 9-21-23 for Lt hip MSSA abscess  She can be switched to Ceftriaxone 2 gm IV q day at time of discharge, until 9-21-23. Medical Decision Making/Summary/Discussion:8/24/2023       Infection Control Recommendations   [place] Precautions  Contact isolation    Antimicrobial Stewardship Recommendations     Simplification of therapy  Targeted therapy    Coordination of Outpatient Care:   Estimated Length of IV antimicrobials: 9-21-23  Patient will need Midline Catheter Insertion: Yes  Patient will need PICC line Insertion:TBD  Patient will need: Home IV , [address] [place],  [facility],  LTAC: TBD  Patient will need outpatient wound care: Yes    Chief complaint/reason for consultation:   Left hip abscess      History of Present Illness:   Brad Moralez is a 39y.o.-year-old female who was initially admitted on 8/20/2023. Patient seen at the request of Dr. Katharina Mcqueen:    Patient has a past medical history of prior MRSA infection, anxiety, alcohol abuse, history of emigdio-en-y gastric bypass, bipolar type 1, and drug abuse. She presents with a chief complaint of left hip surgical wound dehiscence. Patient had a left hip hemiarthroplasty on 7/19/2023. Wound staples were removed on 8/2/23. Patient states that wound was closed at time of staple removal. However, after about a week the incision started to separate and eventually started to drain. Her wound nurse measured an opening approximately 6 cm deep. The RN contacted orthopedic surgeon who sent patient to the emergency department for observation.       Patient states that drainage has been seen from the wound which she describes as being malalignment. On rest of AP view of pelvis there is no acute process or change. Medical Decision Jxlxhu-Rutikfbi-Nnwti:     Results       Procedure Component Value Units Date/Time    Culture, Anaerobic and Aerobic [[id number]]     Order Status: Canceled Specimen: Wound     Culture, Anaerobic and Aerobic [[id number]]  (Abnormal)  (Susceptibility) Collected: 08/20/23 2032    Order Status: Completed Specimen: Buttock Updated: 08/22/23 1044     Specimen Description . BUTTOCK     Direct Exam RARE NEUTROPHILS      FEW GRAM POSITIVE COCCI IN CLUSTERS     Culture STAPHYLOCOCCUS AUREUS MODERATE GROWTH This isolate is methicillin susceptible. NORMAL SKIN LALY    Susceptibility        Staphylococcus aureus      BACTERIAL SUSCEPTIBILITY PANEL JAVED (Preliminary)      clindamycin <=0.25  Sensitive      erythromycin <=0.25  Sensitive      gentamicin <=0.5  Sensitive  [1]       levofloxacin 1  Sensitive      oxacillin 0.5  Sensitive      penicillin >=0.5  Resistant      tetracycline <=1  Sensitive      trimethoprim-sulfamethoxazole <=10  Sensitive                   [1]  Gentamicin is used only in combination with other active agents that test susceptible. Culture, Blood 1 [[id number]] Collected: 08/20/23 1914    Order Status: Completed Specimen: Blood Updated: 08/23/23 2130     Specimen Description . BLOOD     Special Requests L HAND 1ML     Culture NO GROWTH 3 DAYS    Culture, Blood 1 [[id number]] Collected: 08/20/23 1811    Order Status: Completed Specimen: Blood Updated: 08/23/23 2130     Specimen Description . BLOOD     Special Requests L AC 10ML     Culture NO GROWTH 3 DAYS              Medical Decision Making-Other:     Note:    Thank you for allowing us to participate in the care of this patient. Please call with questions. Ignacio Faulkner DPM          ATTESTATION:    I have discussed the case, including pertinent history and exam findings with the medical resident.  I have evaluated the

## 2023-08-25 PROBLEM — F31.9 BIPOLAR DEPRESSION (HCC): Chronic | Status: ACTIVE | Noted: 2023-07-14

## 2023-08-25 PROBLEM — T81.49XA SURGICAL WOUND INFECTION: Chronic | Status: ACTIVE | Noted: 2023-08-22

## 2023-08-25 PROBLEM — L02.416 ABSCESS OF LEFT HIP: Chronic | Status: ACTIVE | Noted: 2023-08-20

## 2023-08-25 LAB
ANION GAP SERPL CALCULATED.3IONS-SCNC: 7 MMOL/L (ref 9–17)
BUN SERPL-MCNC: 5 MG/DL (ref 6–20)
CALCIUM SERPL-MCNC: 7.7 MG/DL (ref 8.6–10.4)
CHLORIDE SERPL-SCNC: 102 MMOL/L (ref 98–107)
CO2 SERPL-SCNC: 23 MMOL/L (ref 20–31)
CREAT SERPL-MCNC: 0.4 MG/DL (ref 0.5–0.9)
CRP SERPL HS-MCNC: 136.9 MG/L (ref 0–5)
GFR SERPL CREATININE-BSD FRML MDRD: >60 ML/MIN/1.73M2
GLUCOSE BLD-MCNC: 109 MG/DL (ref 65–105)
GLUCOSE BLD-MCNC: 129 MG/DL (ref 65–105)
GLUCOSE BLD-MCNC: 142 MG/DL (ref 65–105)
GLUCOSE BLD-MCNC: 171 MG/DL (ref 65–105)
GLUCOSE SERPL-MCNC: 132 MG/DL (ref 70–99)
MICROORGANISM SPEC CULT: NORMAL
MICROORGANISM SPEC CULT: NORMAL
POTASSIUM SERPL-SCNC: 4 MMOL/L (ref 3.7–5.3)
REASON FOR REJECTION: NORMAL
SERVICE CMNT-IMP: NORMAL
SERVICE CMNT-IMP: NORMAL
SODIUM SERPL-SCNC: 132 MMOL/L (ref 135–144)
SPECIMEN DESCRIPTION: NORMAL
SPECIMEN DESCRIPTION: NORMAL
SPECIMEN SOURCE: NORMAL
ZZ NTE CLEAN UP: ORDERED TEST: NORMAL

## 2023-08-25 PROCEDURE — 82947 ASSAY GLUCOSE BLOOD QUANT: CPT

## 2023-08-25 PROCEDURE — 2580000003 HC RX 258: Performed by: ORTHOPAEDIC SURGERY

## 2023-08-25 PROCEDURE — 99232 SBSQ HOSP IP/OBS MODERATE 35: CPT | Performed by: INTERNAL MEDICINE

## 2023-08-25 PROCEDURE — 86140 C-REACTIVE PROTEIN: CPT

## 2023-08-25 PROCEDURE — 6360000002 HC RX W HCPCS: Performed by: INTERNAL MEDICINE

## 2023-08-25 PROCEDURE — 2580000003 HC RX 258

## 2023-08-25 PROCEDURE — 6360000002 HC RX W HCPCS: Performed by: ORTHOPAEDIC SURGERY

## 2023-08-25 PROCEDURE — 80048 BASIC METABOLIC PNL TOTAL CA: CPT

## 2023-08-25 PROCEDURE — 36415 COLL VENOUS BLD VENIPUNCTURE: CPT

## 2023-08-25 PROCEDURE — 1200000000 HC SEMI PRIVATE

## 2023-08-25 PROCEDURE — 6370000000 HC RX 637 (ALT 250 FOR IP): Performed by: ORTHOPAEDIC SURGERY

## 2023-08-25 PROCEDURE — 6360000002 HC RX W HCPCS

## 2023-08-25 PROCEDURE — 97530 THERAPEUTIC ACTIVITIES: CPT

## 2023-08-25 PROCEDURE — 97110 THERAPEUTIC EXERCISES: CPT

## 2023-08-25 PROCEDURE — 97116 GAIT TRAINING THERAPY: CPT

## 2023-08-25 RX ORDER — FLUOXETINE HYDROCHLORIDE 40 MG/1
40 CAPSULE ORAL DAILY
Qty: 30 CAPSULE | Refills: 3 | Status: SHIPPED | OUTPATIENT
Start: 2023-08-26

## 2023-08-25 RX ORDER — MORPHINE SULFATE 2 MG/ML
2 INJECTION, SOLUTION INTRAMUSCULAR; INTRAVENOUS ONCE
Status: COMPLETED | OUTPATIENT
Start: 2023-08-25 | End: 2023-08-25

## 2023-08-25 RX ORDER — OXYCODONE HYDROCHLORIDE 5 MG/1
5 TABLET ORAL EVERY 6 HOURS PRN
Qty: 12 TABLET | Refills: 0 | Status: SHIPPED | OUTPATIENT
Start: 2023-08-25 | End: 2023-08-28

## 2023-08-25 RX ADMIN — Medication 2000 MG: at 13:25

## 2023-08-25 RX ADMIN — MORPHINE SULFATE 2 MG: 2 INJECTION, SOLUTION INTRAMUSCULAR; INTRAVENOUS at 09:50

## 2023-08-25 RX ADMIN — FONDAPARINUX SODIUM 10 MG: 5 INJECTION, SOLUTION SUBCUTANEOUS at 09:49

## 2023-08-25 RX ADMIN — MORPHINE SULFATE 2 MG: 2 INJECTION, SOLUTION INTRAMUSCULAR; INTRAVENOUS at 23:51

## 2023-08-25 RX ADMIN — FLUOXETINE HYDROCHLORIDE 40 MG: 20 CAPSULE ORAL at 09:49

## 2023-08-25 RX ADMIN — MORPHINE SULFATE 2 MG: 2 INJECTION, SOLUTION INTRAMUSCULAR; INTRAVENOUS at 01:06

## 2023-08-25 RX ADMIN — Medication 2000 MG: at 04:25

## 2023-08-25 RX ADMIN — MORPHINE SULFATE 2 MG: 2 INJECTION, SOLUTION INTRAMUSCULAR; INTRAVENOUS at 18:04

## 2023-08-25 RX ADMIN — MORPHINE SULFATE 2 MG: 2 INJECTION, SOLUTION INTRAMUSCULAR; INTRAVENOUS at 13:54

## 2023-08-25 RX ADMIN — SODIUM CHLORIDE, PRESERVATIVE FREE 10 ML: 5 INJECTION INTRAVENOUS at 09:50

## 2023-08-25 RX ADMIN — MORPHINE SULFATE 2 MG: 2 INJECTION, SOLUTION INTRAMUSCULAR; INTRAVENOUS at 16:34

## 2023-08-25 RX ADMIN — MORPHINE SULFATE 2 MG: 2 INJECTION, SOLUTION INTRAMUSCULAR; INTRAVENOUS at 05:35

## 2023-08-25 RX ADMIN — PRAVASTATIN SODIUM 40 MG: 20 TABLET ORAL at 20:27

## 2023-08-25 RX ADMIN — Medication 2000 MG: at 20:27

## 2023-08-25 RX ADMIN — SODIUM CHLORIDE: 9 INJECTION, SOLUTION INTRAVENOUS at 19:31

## 2023-08-25 RX ADMIN — SODIUM CHLORIDE: 9 INJECTION, SOLUTION INTRAVENOUS at 04:25

## 2023-08-25 ASSESSMENT — PAIN DESCRIPTION - DESCRIPTORS
DESCRIPTORS: ACHING

## 2023-08-25 ASSESSMENT — PAIN - FUNCTIONAL ASSESSMENT
PAIN_FUNCTIONAL_ASSESSMENT: ACTIVITIES ARE NOT PREVENTED

## 2023-08-25 ASSESSMENT — PAIN DESCRIPTION - LOCATION
LOCATION: HIP;LEG
LOCATION: HIP
LOCATION: HIP
LOCATION: LEG
LOCATION: HIP

## 2023-08-25 ASSESSMENT — PAIN SCALES - WONG BAKER
WONGBAKER_NUMERICALRESPONSE: 0

## 2023-08-25 ASSESSMENT — PAIN DESCRIPTION - ORIENTATION
ORIENTATION: POSTERIOR
ORIENTATION: LOWER
ORIENTATION: LEFT
ORIENTATION: OUTER
ORIENTATION: POSTERIOR

## 2023-08-25 ASSESSMENT — PAIN SCALES - GENERAL
PAINLEVEL_OUTOF10: 8
PAINLEVEL_OUTOF10: 0
PAINLEVEL_OUTOF10: 10
PAINLEVEL_OUTOF10: 0
PAINLEVEL_OUTOF10: 0
PAINLEVEL_OUTOF10: 10
PAINLEVEL_OUTOF10: 10
PAINLEVEL_OUTOF10: 0
PAINLEVEL_OUTOF10: 8
PAINLEVEL_OUTOF10: 9
PAINLEVEL_OUTOF10: 7
PAINLEVEL_OUTOF10: 0
PAINLEVEL_OUTOF10: 0

## 2023-08-25 NOTE — PROGRESS NOTES
[facility]  Internal Medicine Teaching Residency Program  Inpatient Daily Progress Note  ______________________________________________________________________________    Patient: Julian Soriano  YOB: 1980   SGV:[id number]    Acct: [de-identified]     Room: [address] [place] [address]  Admit date: 8/20/2023  Today's date: 08/24/23  Number of days in the hospital: 4    SUBJECTIVE   Admitting Diagnosis: Abscess of left hip  CC: left hip surgical site dehiscence     - Pt examined at bedside. Chart & results reviewed. - she is having increased pain this morning  - wound vac container changed due to more than expected serosanguineous fluid coming from wound vac    - Would like to be placed at SNF now       Review of Systems   Constitutional:  Negative for chills and fever. Respiratory:  Negative for cough and shortness of breath. Cardiovascular:  Negative for chest pain, palpitations and leg swelling. Gastrointestinal:  Negative for abdominal distention, blood in stool, diarrhea and nausea. Musculoskeletal:  Positive for hip pain. Negative for myalgias. Skin:  Positive for wound. Neurological:  Negative for headaches. BRIEF HISTORY     The patient is a pleasant 39 y.o. female with PMH of past MRSA infection, anxiety, alcohol abuse, hx of emigdio-en-y, bipolar type 1, and drug abuse presents with a chief complaint of left hip surgical wound dehiscence. The patient had a left hip hemiarthroplast 7/19/23. The patient's states that she has surgical sutures removed 8/2. After the sutures came out the wound stayed relatively intact for a weeks or so, but a few days ago began to separate. Significant drainage from wound has been noted expelling from the wound of the past week with some being milky. She did receive some bactrim from her facility for a few days. ED course  Ortho consulted from ED.  Ortho wanting to perform surgery this week to wash out wound and for post operative infection TECHNOLOGIST PROVIDED HISTORY: Concern for post operative infection Reason for Exam: hip surgery 7/19/23,incision now red,painful,seeping FINDINGS: Redemonstration of intact left hip arthroplasty without any fracture or malalignment and without interval change. On rest of AP view of pelvis there is no fracture or malalignment. Right hip joint appears to be normal.  Evidence of moderate to marked multilevel spondylosis in the visualized lower lumbar spine and lumbosacral junction. Unchanged, intact left knee arthroplasty without fracture or malalignment. On rest of AP view of pelvis there is no acute process or change. ASSESSMENT & PLAN     This is a 39 y.o. female who presented with surgical wound dehiscence and found to have surgical wound infection. Principal Problem:    Abscess of left hip  Active Problems:    Antiphospholipid syndrome (HCC)    Obesity, Class III, BMI 40-49.9 (morbid obesity) ([address])    Bipolar depression ([address])    Surgical wound infection    Staph aureus infection  Resolved Problems:    * No resolved hospital problems.  *     Abscess of left hip  Surgical wound dehiscence  - Left hip hemiarthroplasty conducted last month  - Washout and debridement conducted yesterday   - Antibiotic beads placed  - Wound vac in place   - Wound growing MSSA  - Continue to manage pain   - ID on board- continue cefazolin, switch to rocephin on discharge   - PICC line placed        Antiphospholipid syndrome (HCC)  - Will continue fondaparinux from home        Obesity, Class III, BMI 40-49.9 (morbid obesity)   - History of emigdio-en-y   - History of T2DM       Bipolar depression ([address])  - Continue patient's fluoxetine      Polysubstance abuse  - Patient has extensive history noted in chart of overdoses and drug abuse   - No signs of withdrawal noted      DVT ppx: fondaparinux   GI ppx: none      PT/OT/SW: evaluated   Discharge Planning: home with [address] vs SNF     Joanne Martinez

## 2023-08-25 NOTE — PROGRESS NOTES
[facility] - Progress Note  Today's Date and Time: 8/25/2023, 9:27 AM    Impression :   Left hip abscess/surgical wound dehiscence. MSSA  S/p left hip cecilio arthroplasty 7-19-23    Recommendations:   D/C Ceftaroline as MRSA is excluded  Cefazolin 2 gm IV q 8 Hr . Stop date 9-21-23 for Lt hip MSSA abscess  She can be switched to Ceftriaxone 2 gm IV q day at time of discharge, until 9-21-23. Medical Decision Making/Summary/Discussion:8/25/2023       Infection Control Recommendations   [place] Precautions  Contact isolation    Antimicrobial Stewardship Recommendations     Simplification of therapy  Targeted therapy    Coordination of Outpatient Care:   Estimated Length of IV antimicrobials: 9-21-23  Patient will need Midline Catheter Insertion: Yes  Patient will need PICC line Insertion:TBD  Patient will need: Home IV , [address] [place],  SNF,  LTAC: TBD  Patient will need outpatient wound care: Yes    Chief complaint/reason for consultation:   Left hip abscess      History of Present Illness:   Cele Roman is a 39y.o.-year-old female who was initially admitted on 8/20/2023. Patient seen at the request of Dr. Ryan Hairston:    Patient has a past medical history of prior MRSA infection, anxiety, alcohol abuse, history of emigdio-en-y gastric bypass, bipolar type 1, and drug abuse. She presents with a chief complaint of left hip surgical wound dehiscence. Patient had a left hip hemiarthroplasty on 7/19/2023. Wound staples were removed on 8/2/23. Patient states that wound was closed at time of staple removal. However, after about a week the incision started to separate and eventually started to drain. Her wound nurse measured an opening approximately 6 cm deep. The RN contacted orthopedic surgeon who sent patient to the emergency department for observation.       Patient states that drainage has been seen from the wound which she describes as being findings and pictures of the patient and the key elements of the encounter have been performed by me. I have reviewed the laboratory data, other diagnostic studies and discussed them with the medical resident. I have updated the medical record where necessary. I agree with the assessment, plan and orders as documented by the medical resident and I have modified them as necessary. Elements of Medical Decision Making:  Note: I have independently performed the steps listed below as part of the medical decision making and evaluation. Examined and discussed with patient. Labs, medications, radiologic studies were reviewed with personal review of films  Large amounts of data were reviewed  Discussed with nursing Staff, Discharge planner  Infection Control and Prevention measures reviewed  All prior entries were reviewed  Administer medications as ordered  Prognosis: Guarded  Discharge planning reviewed  Follow up as outpatient.     Clement Calle MD.

## 2023-08-25 NOTE — PROGRESS NOTES
Bariatric Surgery (2013); Dilation and curettage of uterus (2005); Finger amputation (Left, 02/09/2015); lymph node biopsy (1990); Total abdominal hysterectomy w/ bilateral salpingoophorectomy (2011); IVC filter insertion; hip surgery (Left, 07/19/2023); Hip debridement (Left, 08/21/2023); Ankle fracture surgery (Left, 8/21/2023); and hc cath power picc single (8/23/2023). Assessment   Body Structures, Functions, Activity Limitations Requiring Skilled Therapeutic Intervention: Decreased functional mobility ; Decreased strength;Decreased endurance; Increased pain  Assessment: Pt ambulated 8ft x2 with RW and CGA, significant difficulty advancing LLE throughout d/t pain and decreased strength. Pt currently unsafe to return to prior living arrangements at discharge d/t being a high fall risk. Recommending continued PT to address deficits and maximize safety and independence with mobility. Therapy Prognosis: Good  Requires PT Follow-Up: Yes  Activity Tolerance  Activity Tolerance: Patient limited by fatigue;Patient limited by endurance; Patient limited by pain     Plan   Physcial Therapy Plan  General Plan:  (5-6x/week)  Current Treatment Recommendations: Strengthening, Functional mobility training, Transfer training, Endurance training, Stair training, Gait training, Pain management, Safety education & training  Safety Devices  Type of Devices: Nurse notified, Gait belt, Call light within reach, Heels elevated for pressure relief, Left in chair, Chair alarm in place  Restraints  Restraints Initially in Place: No     Restrictions  Restrictions/Precautions  Restrictions/Precautions: Fall Risk, Weight Bearing  Required Braces or Orthoses?: Yes  Lower Extremity Weight Bearing Restrictions  Left Lower Extremity Weight Bearing: Weight Bearing As Tolerated  Position Activity Restriction  Hip Precautions: Posterior hip precautions  Other position/activity restrictions: left hip hemiarthroplasty 7/19/2023; Posterior hip precautions per orthopedic note in chart from previous admission & follow up appointment; s/p I&D with wound vac application 3/30/11     Subjective   General  Patient assessed for rehabilitation services?: Yes  Response To Previous Treatment: Patient with no complaints from previous session. Family / Caregiver Present: Yes ( arrived during middle of session.)  Follows Commands: Within Functional Limits  General Comment  Comments: Pt retired to seated in chair at end of session with call light in reach. Subjective  Subjective: Pt and RN agreeable to PT this afternoon. Pt supine in bed with c/o 9/10 pain in L hip/groin, required encouragement to participate. Pleasant and cooperative throughout session. Cognition   Orientation  Overall Orientation Status: Within Functional Limits  Orientation Level: Oriented X4  Cognition  Overall Cognitive Status: Exceptions  Safety Judgement: Decreased awareness of need for assistance;Decreased awareness of need for safety  Problem Solving: Decreased awareness of errors     Objective   Bed mobility  Supine to Sit: Contact guard assistance  Sit to Supine: Unable to assess  Scooting: Contact guard assistance  Bed Mobility Comments: HOB elevated, significant amount of time and effort required to perform d/t significant pain in L hip with sharp shooting pain in groin area. Transfers  Sit to Stand: Minimal Assistance  Stand to Sit: Minimal Assistance  Comment: RW used for transfers, pt performed x1 from bed and x1 from commode, good hand placement throughout. Ambulation  Surface: Level tile  Device: Rolling Walker  Assistance: Contact guard assistance  Quality of Gait: Decreased stance time on LLE, antalgic gait, forward flexed  Gait Deviations: Shuffles; Slow Ami;Decreased step length;Decreased step height  Distance: 8ft x2  Comments: Pt ambulated bed to commode, required a lengthy seated rest break, then ambulated to chair.  Pt with very heavy reliance on RW

## 2023-08-25 NOTE — CARE COORDINATION
Additional choices received from pt, referrals are placed. Spoke with Anurag Ospina at [facility] she will review and call writer back. Spoke with Tess at [facility], pt is accepted. [facility] cannot have pts pain medication in facility tonight. Per Tess pts pain medication will be there in morning. Rx faxed to [phone] and placed in blue envelope with completed HENS and AVS. Will need to schedule transportation in am 8/25/23 . [facility] and was told there is no transport available but to call tomorrow to fill cancellations. Please call Tess at [facility] with transport time.

## 2023-08-25 NOTE — PLAN OF CARE
Problem: Discharge Planning  Goal: Discharge to home or other facility with appropriate resources  8/25/2023 1859 by May Almaguer RN  Outcome: Progressing     Problem: Pain  Goal: Verbalizes/displays adequate comfort level or baseline comfort level  8/25/2023 1859 by May Almaguer RN  Outcome: Progressing     Problem: ABCDS Injury Assessment  Goal: Absence of physical injury  8/25/2023 1859 by May Almaguer RN  Outcome: Progressing     Problem: Skin/Tissue Integrity  Goal: Absence of new skin breakdown  Description: 1. Monitor for areas of redness and/or skin breakdown  2. Assess vascular access sites hourly  3. Every 4-6 hours minimum:  Change oxygen saturation probe site  4. Every 4-6 hours:  If on nasal continuous positive airway pressure, respiratory therapy assess nares and determine need for appliance change or resting period.   8/25/2023 1859 by May Almaguer RN  Outcome: Progressing

## 2023-08-26 VITALS
DIASTOLIC BLOOD PRESSURE: 77 MMHG | BODY MASS INDEX: 40.04 KG/M2 | TEMPERATURE: 98.2 F | HEART RATE: 101 BPM | OXYGEN SATURATION: 99 % | RESPIRATION RATE: 16 BRPM | SYSTOLIC BLOOD PRESSURE: 128 MMHG | HEIGHT: 68 IN | WEIGHT: 264.2 LBS

## 2023-08-26 PROBLEM — E87.1 HYPONATREMIA: Status: ACTIVE | Noted: 2023-08-26

## 2023-08-26 PROBLEM — D64.9 ANEMIA: Status: ACTIVE | Noted: 2023-08-26

## 2023-08-26 LAB
ANION GAP SERPL CALCULATED.3IONS-SCNC: 6 MMOL/L (ref 9–17)
BASOPHILS # BLD: 0.03 K/UL (ref 0–0.2)
BASOPHILS NFR BLD: 1 % (ref 0–2)
BUN SERPL-MCNC: 6 MG/DL (ref 6–20)
CALCIUM SERPL-MCNC: 7.4 MG/DL (ref 8.6–10.4)
CHLORIDE SERPL-SCNC: 105 MMOL/L (ref 98–107)
CO2 SERPL-SCNC: 23 MMOL/L (ref 20–31)
CREAT SERPL-MCNC: 0.4 MG/DL (ref 0.5–0.9)
CRP SERPL HS-MCNC: 91.6 MG/L (ref 0–5)
EOSINOPHIL # BLD: 0.12 K/UL (ref 0–0.44)
EOSINOPHILS RELATIVE PERCENT: 2 % (ref 1–4)
ERYTHROCYTE [DISTWIDTH] IN BLOOD BY AUTOMATED COUNT: 13.3 % (ref 11.8–14.4)
GFR SERPL CREATININE-BSD FRML MDRD: >60 ML/MIN/1.73M2
GLUCOSE BLD-MCNC: 118 MG/DL (ref 65–105)
GLUCOSE SERPL-MCNC: 153 MG/DL (ref 70–99)
HCT VFR BLD AUTO: 28.4 % (ref 36.3–47.1)
HGB BLD-MCNC: 8.5 G/DL (ref 11.9–15.1)
IMM GRANULOCYTES # BLD AUTO: <0.03 K/UL (ref 0–0.3)
IMM GRANULOCYTES NFR BLD: 0 %
LYMPHOCYTES NFR BLD: 1.67 K/UL (ref 1.1–3.7)
LYMPHOCYTES RELATIVE PERCENT: 25 % (ref 24–43)
MCH RBC QN AUTO: 30.9 PG (ref 25.2–33.5)
MCHC RBC AUTO-ENTMCNC: 29.9 G/DL (ref 28.4–34.8)
MCV RBC AUTO: 103.3 FL (ref 82.6–102.9)
MONOCYTES NFR BLD: 0.63 K/UL (ref 0.1–1.2)
MONOCYTES NFR BLD: 10 % (ref 3–12)
NEUTROPHILS NFR BLD: 63 % (ref 36–65)
NEUTS SEG NFR BLD: 4.17 K/UL (ref 1.5–8.1)
NRBC BLD-RTO: 0 PER 100 WBC
PLATELET # BLD AUTO: 373 K/UL (ref 138–453)
PMV BLD AUTO: 11.4 FL (ref 8.1–13.5)
POTASSIUM SERPL-SCNC: 3.8 MMOL/L (ref 3.7–5.3)
RBC # BLD AUTO: 2.75 M/UL (ref 3.95–5.11)
RBC # BLD: ABNORMAL 10*6/UL
SODIUM SERPL-SCNC: 134 MMOL/L (ref 135–144)
WBC OTHER # BLD: 6.6 K/UL (ref 3.5–11.3)

## 2023-08-26 PROCEDURE — 99232 SBSQ HOSP IP/OBS MODERATE 35: CPT | Performed by: INTERNAL MEDICINE

## 2023-08-26 PROCEDURE — 82947 ASSAY GLUCOSE BLOOD QUANT: CPT

## 2023-08-26 PROCEDURE — 6370000000 HC RX 637 (ALT 250 FOR IP): Performed by: ORTHOPAEDIC SURGERY

## 2023-08-26 PROCEDURE — 6360000002 HC RX W HCPCS: Performed by: ORTHOPAEDIC SURGERY

## 2023-08-26 PROCEDURE — 6360000002 HC RX W HCPCS

## 2023-08-26 PROCEDURE — 6360000002 HC RX W HCPCS: Performed by: INTERNAL MEDICINE

## 2023-08-26 PROCEDURE — 80048 BASIC METABOLIC PNL TOTAL CA: CPT

## 2023-08-26 PROCEDURE — 86140 C-REACTIVE PROTEIN: CPT

## 2023-08-26 PROCEDURE — 99239 HOSP IP/OBS DSCHRG MGMT >30: CPT | Performed by: INTERNAL MEDICINE

## 2023-08-26 PROCEDURE — 85025 COMPLETE CBC W/AUTO DIFF WBC: CPT

## 2023-08-26 PROCEDURE — 2580000003 HC RX 258

## 2023-08-26 PROCEDURE — 36415 COLL VENOUS BLD VENIPUNCTURE: CPT

## 2023-08-26 RX ADMIN — FLUOXETINE HYDROCHLORIDE 40 MG: 20 CAPSULE ORAL at 08:00

## 2023-08-26 RX ADMIN — HYDROMORPHONE HYDROCHLORIDE 0.5 MG: 1 INJECTION, SOLUTION INTRAMUSCULAR; INTRAVENOUS; SUBCUTANEOUS at 10:56

## 2023-08-26 RX ADMIN — Medication 2000 MG: at 03:42

## 2023-08-26 RX ADMIN — MORPHINE SULFATE 2 MG: 2 INJECTION, SOLUTION INTRAMUSCULAR; INTRAVENOUS at 03:42

## 2023-08-26 RX ADMIN — MORPHINE SULFATE 2 MG: 2 INJECTION, SOLUTION INTRAMUSCULAR; INTRAVENOUS at 08:00

## 2023-08-26 RX ADMIN — FONDAPARINUX SODIUM 10 MG: 5 INJECTION, SOLUTION SUBCUTANEOUS at 08:00

## 2023-08-26 RX ADMIN — SODIUM CHLORIDE, PRESERVATIVE FREE 10 ML: 5 INJECTION INTRAVENOUS at 08:05

## 2023-08-26 ASSESSMENT — PAIN SCALES - WONG BAKER
WONGBAKER_NUMERICALRESPONSE: 0

## 2023-08-26 ASSESSMENT — PAIN - FUNCTIONAL ASSESSMENT
PAIN_FUNCTIONAL_ASSESSMENT: ACTIVITIES ARE NOT PREVENTED
PAIN_FUNCTIONAL_ASSESSMENT: ACTIVITIES ARE NOT PREVENTED

## 2023-08-26 ASSESSMENT — ENCOUNTER SYMPTOMS
EYE DISCHARGE: 0
ABDOMINAL DISTENTION: 0
APNEA: 0

## 2023-08-26 ASSESSMENT — PAIN SCALES - GENERAL
PAINLEVEL_OUTOF10: 0
PAINLEVEL_OUTOF10: 9
PAINLEVEL_OUTOF10: 0
PAINLEVEL_OUTOF10: 10
PAINLEVEL_OUTOF10: 10

## 2023-08-26 ASSESSMENT — PAIN DESCRIPTION - LOCATION
LOCATION: LEG
LOCATION: INCISION;LEG;HIP

## 2023-08-26 ASSESSMENT — PAIN DESCRIPTION - DESCRIPTORS
DESCRIPTORS: ACHING
DESCRIPTORS: ACHING

## 2023-08-26 ASSESSMENT — PAIN DESCRIPTION - ORIENTATION
ORIENTATION: LEFT
ORIENTATION: LEFT

## 2023-08-26 NOTE — PROGRESS NOTES
ONE XRAY VIEW OF THE PELVIS AND 2 XRAY VIEWS LEFT HIP 8/20/2023 7:59 pm COMPARISON: Radiographs of left hip joint and pelvis on 8/7/2023 HISTORY: ORDERING SYSTEM PROVIDED HISTORY: concern for post operative infection TECHNOLOGIST PROVIDED HISTORY: Concern for post operative infection Reason for Exam: hip surgery 7/19/23,incision now red,painful,seeping FINDINGS: Redemonstration of intact left hip arthroplasty without any fracture or malalignment and without interval change. On rest of AP view of pelvis there is no fracture or malalignment. Right hip joint appears to be normal.  Evidence of moderate to marked multilevel spondylosis in the visualized lower lumbar spine and lumbosacral junction. Unchanged, intact left knee arthroplasty without fracture or malalignment. On rest of AP view of pelvis there is no acute process or change. ASSESSMENT & PLAN     This is a 39 y.o. female who presented with surgical wound dehiscence and found to have surgical wound infection. Principal Problem:    Abscess of left hip  Active Problems:    Antiphospholipid syndrome (HCC)    Obesity, Class III, BMI 40-49.9 (morbid obesity) ([address])    Bipolar depression ([address])    Surgical wound infection    Staph aureus infection  Resolved Problems:    * No resolved hospital problems. *     Abscess of left hip  Surgical wound dehiscence  - Left hip hemiarthroplasty conducted last month  - Washout and debridement conducted this admission   - Antibiotic beads placed  - Wound vac in place   - Wound growing MSSA  - Continue to manage pain   - ID on board- continue cefazolin, switch to rocephin on discharge   - PICC line in place   - Will need outpatient follow up with ID and ortho     Acute normocytic Anemia   - possible related to I&D and hip wound.  Patient is on fondaparinux   Recent Labs     08/24/23  1530 08/26/23  0401   HGB 11.3* 8.5*      Hyponatremia  - will encourage po intake  - continue to monitor   Recent Labs     08/24/23  0602 08/25/23  1023 08/26/23  0401    132* 134*            Antiphospholipid syndrome (HCC)  - Will continue fondaparinux from home        Obesity, Class III, BMI 40-49.9 (morbid obesity)   - History of emigdio-en-y   - History of T2DM       Bipolar depression ([address])  - Continue patient's fluoxetine      Polysubstance abuse  - Patient has extensive history noted in chart of overdoses and drug abuse   - No signs of withdrawal noted      DVT ppx: fondaparinux   GI ppx: none      PT/OT/SW: evaluated   Discharge Planning:  [facility]     Diandra Souza MD  Internal Medicine Resident, PGY-1  [facility]; [place], [place]  8/26/2023, 9:18 AM  Attending Physician Statement  I have discussed the care of Rolando Henry and I have examined the patient myself and taken ROS and HPI, including pertinent history and exam findings, with the resident. I have reviewed the key elements of all parts of the encounter with the resident. I agree with the assessment, plan and orders as documented by the resident.       Electronically signed by Gerard Gomez MD

## 2023-08-26 NOTE — CARE COORDINATION
Call to Angy at [address] [facility] they have no transportation available until [address] with Simone at [facility] transport they can accommodate wheelchair transport at noon today. Pt will be responsible for $65 copay, upon meeting with pt she has a wound vac in place, IV in place, she has decreased mobility balance and endurance per PT notes, Spoke with [facility] pt scheduled for stretcher transport at 10am to [facility]. Call to Tess with [facility] to inform her transport would be for 10am this am,  for return call. Call to facility to notify them of transport time, call went to [place] again and message left. .     Call to [facility] [place] [address] [place] [address] to obtain report number and to notify of transport time, Spoke to [facility] \"nursing\" notified of transport time and Bernard provided report. Paperwork faxed to [facility] for transport.  Pt updated she would be transporting at 10am, script is on chart along with ANDREW.     2426 return call from [facility] at [facility] they are ready for pt

## 2023-08-26 NOTE — PLAN OF CARE

## 2023-08-26 NOTE — PROGRESS NOTES
Occupational [address]  Occupational Therapy Not Seen Note    DATE: 2023    NAME: Courtney Alfaro  MRN: [id number]   : 1980      Patient not seen this date for Occupational Therapy due to:     Other: Transport arriving to transport pt to facility    Next Scheduled Treatment: Attempt in PM or  if pt still present    Electronically signed by ROBERTO Drew on 2023 at 10:47 AM

## 2023-08-27 NOTE — DISCHARGE SUMMARY
narcotics.  -Follow up with Dr. Mirta Light in his office on 9/6/23 at 2:15pm. Call [phone] to schedule/confirm or with any questions/concerns. Follow up labs: None  Follow up imaging: None     Note that over 30 minutes was spent in preparing discharge papers, discussing discharge with patient, medication review, etc.      Rosi Hightower MD  Internal Medicine Resident, PGY-1  [address] [facility]; [address], [place]  8/27/2023, 4:36 PM     Attending Physician Statement  I have discussed the care of Yeyo Morales and I have examined the patient myselft and taken ROS and HPI, including pertinent history and exam findings, with the resident. I have reviewed the key elements of all parts of the encounter with the resident. I agree with the assessment, plan and orders as documented by the resident. I spent over 35 mins in direct patient care as above and reviewing medications and counseling for discharge.     Electronically signed by Kailyn Hammond MD

## 2023-08-28 ENCOUNTER — TELEPHONE (OUTPATIENT)
Dept: ORTHOPEDIC SURGERY | Age: 43
End: 2023-08-28

## 2023-08-28 NOTE — TELEPHONE ENCOUNTER
This patient called our office requesting more wound vac canisters. Patient is currently in a SNF and was advised she should be having this ordered by her facility. Patient says facility can not get these and will need to take vac off. Writer called facility and spoke to patient nurse. They are currently attempting to obtain these. If they can not they will call this office.

## 2023-08-28 NOTE — TELEPHONE ENCOUNTER
Stefan Conner at [facility] returned someone's call, please call her back at [phone].

## 2023-08-28 NOTE — TELEPHONE ENCOUNTER
Spoke with [email or web address]. He will be going to the facility to assist with wound vac. Spoke with patient nurse, [address] She is aware Nidia Cadet is coming out. Gave contact info for Nidia Cadet. Renan Fails facility info.

## 2023-08-28 NOTE — TELEPHONE ENCOUNTER
8/21/2023  Wound vac canister full. Can not find replacement canisters. Want to know if they can change dressing and put a whole different vac?

## 2023-09-05 ENCOUNTER — OFFICE VISIT (OUTPATIENT)
Dept: INFECTIOUS DISEASES | Age: 43
End: 2023-09-05
Payer: MEDICARE

## 2023-09-05 VITALS
DIASTOLIC BLOOD PRESSURE: 67 MMHG | TEMPERATURE: 98.7 F | BODY MASS INDEX: 39.4 KG/M2 | WEIGHT: 260 LBS | HEIGHT: 68 IN | HEART RATE: 83 BPM | RESPIRATION RATE: 20 BRPM | OXYGEN SATURATION: 100 % | SYSTOLIC BLOOD PRESSURE: 100 MMHG

## 2023-09-05 DIAGNOSIS — L02.416 ABSCESS OF LEFT HIP: Chronic | ICD-10-CM

## 2023-09-05 DIAGNOSIS — A49.01 STAPH AUREUS INFECTION: Primary | ICD-10-CM

## 2023-09-05 DIAGNOSIS — E11.10 DKA, TYPE 2, NOT AT GOAL (HCC): ICD-10-CM

## 2023-09-05 DIAGNOSIS — S72.002G CLOSED FRACTURE OF LEFT HIP WITH DELAYED HEALING, SUBSEQUENT ENCOUNTER: ICD-10-CM

## 2023-09-05 PROCEDURE — 2022F DILAT RTA XM EVC RTNOPTHY: CPT | Performed by: INTERNAL MEDICINE

## 2023-09-05 PROCEDURE — 3074F SYST BP LT 130 MM HG: CPT | Performed by: INTERNAL MEDICINE

## 2023-09-05 PROCEDURE — G8427 DOCREV CUR MEDS BY ELIG CLIN: HCPCS | Performed by: INTERNAL MEDICINE

## 2023-09-05 PROCEDURE — G8417 CALC BMI ABV UP PARAM F/U: HCPCS | Performed by: INTERNAL MEDICINE

## 2023-09-05 PROCEDURE — 99213 OFFICE O/P EST LOW 20 MIN: CPT | Performed by: INTERNAL MEDICINE

## 2023-09-05 PROCEDURE — 3051F HG A1C>EQUAL 7.0%<8.0%: CPT | Performed by: INTERNAL MEDICINE

## 2023-09-05 PROCEDURE — 1036F TOBACCO NON-USER: CPT | Performed by: INTERNAL MEDICINE

## 2023-09-05 PROCEDURE — 1111F DSCHRG MED/CURRENT MED MERGE: CPT | Performed by: INTERNAL MEDICINE

## 2023-09-05 PROCEDURE — 3078F DIAST BP <80 MM HG: CPT | Performed by: INTERNAL MEDICINE

## 2023-09-05 RX ORDER — OXYCODONE HYDROCHLORIDE 5 MG/1
5 TABLET ORAL EVERY 4 HOURS PRN
COMMUNITY

## 2023-09-05 RX ORDER — LORAZEPAM 0.5 MG/1
0.5 TABLET ORAL EVERY 12 HOURS
COMMUNITY

## 2023-09-05 RX ORDER — BUPROPION HYDROCHLORIDE 150 MG/1
150 TABLET ORAL EVERY MORNING
COMMUNITY

## 2023-09-05 NOTE — PROGRESS NOTES
[facility] [address] [place] [address] Visit Note  Today's Date and Time: 9/5/2023, 1:00 PM    Impression :   Left hip abscess/surgical wound dehiscence. MSSA  S/p left hip cecilio arthroplasty 7-19-23    Recommendations:     Increased Ceftriaxone to 2 gm IV q 12 hrs for Lt hip MSSA abscess- Stop date 10-21-23  Follow-up with Dr. Karoline Iglesias, with Ortho, on 9/6/23  Follow-up with Dr. Tito Maher in 3 weeks    Medical Decision Making/Summary/Discussion:9/5/2023       Infection Control Recommendations   [place] Precautions  Contact isolation    Antimicrobial Stewardship Recommendations     Simplification of therapy  Targeted therapy    Coordination of Outpatient Care:   Estimated Length of IV antimicrobials: 10-21-23  Patient will need Midline Catheter Insertion: Yes  Patient will need PICC line Insertion:TBD  Patient will need: Home IV , [address] [place],  SNF,  LTAC: TBD  Patient will need outpatient wound care: Yes    Chief complaint/reason for consultation:   Left hip abscess      History of Present Illness:   Chayito Stratton is a 39y.o.-year-old female who was initially admitted on (Not on file). Patient seen at the request of Dr. Mar Jaimes:    Patient has a past medical history of prior MRSA infection, anxiety, alcohol abuse, history of emigdio-en-y gastric bypass, bipolar type 1, and drug abuse. She presents with a chief complaint of left hip surgical wound dehiscence. Patient had a left hip hemiarthroplasty on 7/19/2023. Wound staples were removed on 8/2/23. Patient states that wound was closed at time of staple removal. However, after about a week the incision started to separate and eventually started to drain. Her wound nurse measured an opening approximately 6 cm deep. The RN contacted orthopedic surgeon who sent patient to the emergency department for observation.       Patient states that drainage has been seen from the wound which she describes as being milky and

## 2023-09-06 ENCOUNTER — APPOINTMENT (OUTPATIENT)
Dept: GENERAL RADIOLOGY | Age: 43
End: 2023-09-06
Payer: MEDICARE

## 2023-09-06 ENCOUNTER — OFFICE VISIT (OUTPATIENT)
Dept: ORTHOPEDIC SURGERY | Age: 43
End: 2023-09-06

## 2023-09-06 ENCOUNTER — HOSPITAL ENCOUNTER (EMERGENCY)
Age: 43
Discharge: HOME | End: 2023-09-06
Attending: EMERGENCY MEDICINE
Payer: MEDICARE

## 2023-09-06 VITALS
HEIGHT: 68 IN | RESPIRATION RATE: 14 BRPM | OXYGEN SATURATION: 98 % | WEIGHT: 260 LBS | SYSTOLIC BLOOD PRESSURE: 122 MMHG | HEART RATE: 79 BPM | DIASTOLIC BLOOD PRESSURE: 66 MMHG | TEMPERATURE: 98.6 F | BODY MASS INDEX: 39.4 KG/M2

## 2023-09-06 VITALS — WEIGHT: 260 LBS | HEIGHT: 68 IN | BODY MASS INDEX: 39.4 KG/M2

## 2023-09-06 DIAGNOSIS — S72.002D CLOSED FRACTURE OF LEFT HIP WITH ROUTINE HEALING, SUBSEQUENT ENCOUNTER: Primary | ICD-10-CM

## 2023-09-06 DIAGNOSIS — M25.552 LEFT HIP PAIN: Primary | ICD-10-CM

## 2023-09-06 LAB
ANION GAP SERPL CALCULATED.3IONS-SCNC: 8 MMOL/L (ref 9–17)
BASOPHILS # BLD: 0.04 K/UL (ref 0–0.2)
BASOPHILS NFR BLD: 1 % (ref 0–2)
BUN SERPL-MCNC: 8 MG/DL (ref 6–20)
CALCIUM SERPL-MCNC: 8.5 MG/DL (ref 8.6–10.4)
CHLORIDE SERPL-SCNC: 105 MMOL/L (ref 98–107)
CO2 SERPL-SCNC: 26 MMOL/L (ref 20–31)
CREAT SERPL-MCNC: 0.5 MG/DL (ref 0.5–0.9)
CRP SERPL HS-MCNC: 27 MG/L (ref 0–5)
EOSINOPHIL # BLD: 0.17 K/UL (ref 0–0.44)
EOSINOPHILS RELATIVE PERCENT: 2 % (ref 1–4)
ERYTHROCYTE [DISTWIDTH] IN BLOOD BY AUTOMATED COUNT: 13.5 % (ref 11.8–14.4)
ERYTHROCYTE [SEDIMENTATION RATE] IN BLOOD BY PHOTOMETRIC METHOD: 39 MM/HR (ref 0–20)
GFR SERPL CREATININE-BSD FRML MDRD: >60 ML/MIN/1.73M2
GLUCOSE SERPL-MCNC: 140 MG/DL (ref 70–99)
HCT VFR BLD AUTO: 30 % (ref 36.3–47.1)
HGB BLD-MCNC: 9.2 G/DL (ref 11.9–15.1)
IMM GRANULOCYTES # BLD AUTO: 0.03 K/UL (ref 0–0.3)
IMM GRANULOCYTES NFR BLD: 0 %
LACTIC ACID, WHOLE BLOOD: 0.8 MMOL/L (ref 0.7–2.1)
LYMPHOCYTES NFR BLD: 0.98 K/UL (ref 1.1–3.7)
LYMPHOCYTES RELATIVE PERCENT: 12 % (ref 24–43)
MCH RBC QN AUTO: 30.8 PG (ref 25.2–33.5)
MCHC RBC AUTO-ENTMCNC: 30.7 G/DL (ref 28.4–34.8)
MCV RBC AUTO: 100.3 FL (ref 82.6–102.9)
MONOCYTES NFR BLD: 0.68 K/UL (ref 0.1–1.2)
MONOCYTES NFR BLD: 8 % (ref 3–12)
NEUTROPHILS NFR BLD: 77 % (ref 36–65)
NEUTS SEG NFR BLD: 6.57 K/UL (ref 1.5–8.1)
NRBC BLD-RTO: 0 PER 100 WBC
PLATELET # BLD AUTO: 398 K/UL (ref 138–453)
PMV BLD AUTO: 11.4 FL (ref 8.1–13.5)
POTASSIUM SERPL-SCNC: 4.3 MMOL/L (ref 3.7–5.3)
RBC # BLD AUTO: 2.99 M/UL (ref 3.95–5.11)
SODIUM SERPL-SCNC: 139 MMOL/L (ref 135–144)
WBC OTHER # BLD: 8.5 K/UL (ref 3.5–11.3)

## 2023-09-06 PROCEDURE — 80048 BASIC METABOLIC PNL TOTAL CA: CPT

## 2023-09-06 PROCEDURE — 96376 TX/PRO/DX INJ SAME DRUG ADON: CPT

## 2023-09-06 PROCEDURE — 6360000002 HC RX W HCPCS

## 2023-09-06 PROCEDURE — 99284 EMERGENCY DEPT VISIT MOD MDM: CPT

## 2023-09-06 PROCEDURE — 73502 X-RAY EXAM HIP UNI 2-3 VIEWS: CPT

## 2023-09-06 PROCEDURE — 96374 THER/PROPH/DIAG INJ IV PUSH: CPT

## 2023-09-06 PROCEDURE — 85025 COMPLETE CBC W/AUTO DIFF WBC: CPT

## 2023-09-06 PROCEDURE — 83605 ASSAY OF LACTIC ACID: CPT

## 2023-09-06 PROCEDURE — 85652 RBC SED RATE AUTOMATED: CPT

## 2023-09-06 PROCEDURE — 99024 POSTOP FOLLOW-UP VISIT: CPT | Performed by: STUDENT IN AN ORGANIZED HEALTH CARE EDUCATION/TRAINING PROGRAM

## 2023-09-06 PROCEDURE — 86140 C-REACTIVE PROTEIN: CPT

## 2023-09-06 RX ADMIN — HYDROMORPHONE HYDROCHLORIDE 0.5 MG: 1 INJECTION, SOLUTION INTRAMUSCULAR; INTRAVENOUS; SUBCUTANEOUS at 02:38

## 2023-09-06 RX ADMIN — HYDROMORPHONE HYDROCHLORIDE 1 MG: 1 INJECTION, SOLUTION INTRAMUSCULAR; INTRAVENOUS; SUBCUTANEOUS at 06:26

## 2023-09-06 RX ADMIN — HYDROMORPHONE HYDROCHLORIDE 1 MG: 1 INJECTION, SOLUTION INTRAMUSCULAR; INTRAVENOUS; SUBCUTANEOUS at 03:37

## 2023-09-06 RX ADMIN — HYDROMORPHONE HYDROCHLORIDE 1 MG: 1 INJECTION, SOLUTION INTRAMUSCULAR; INTRAVENOUS; SUBCUTANEOUS at 05:29

## 2023-09-06 ASSESSMENT — PAIN SCALES - GENERAL
PAINLEVEL_OUTOF10: 7
PAINLEVEL_OUTOF10: 9
PAINLEVEL_OUTOF10: 7
PAINLEVEL_OUTOF10: 9

## 2023-09-06 ASSESSMENT — ENCOUNTER SYMPTOMS
BLOOD IN STOOL: 0
NAUSEA: 0
SHORTNESS OF BREATH: 0
VOMITING: 0
DIARRHEA: 0
ABDOMINAL PAIN: 0

## 2023-09-06 ASSESSMENT — PAIN - FUNCTIONAL ASSESSMENT: PAIN_FUNCTIONAL_ASSESSMENT: 0-10

## 2023-09-06 NOTE — ED NOTES
Patient presents to ED via EMS with reports of left hip pain. Patient explains she had a left hip replacement about a month ago and a wound vac was placed on it. Patient states the wound vac is now draining purulent drainage and is causing a lot of pain. Patient states she is usually able to use a commode but tonight when going to try to use it, she was unable to without feeling a lot of pain. Patient has a picc line placed in the left upper arm.  Patient alert and oriented, full cardiac monitor in place       [facility] PEBBLES MOHAMUD  09/06/23 7503

## 2023-09-06 NOTE — ED NOTES
SAL called [facility] [phone] at [address] to conform pt is a resident there. Per Nya Givens pt is a resident in room 333. ETA could not be provided to them because [facility] will need to call around as they are booked up per [facility]. Day shift SAL [place] to be informed to call when ETA has been established. Transport forms were faxed to [address] [facility] per request of [facility].       RIOS Broderick  09/06/23 4468

## 2023-09-06 NOTE — ED NOTES
[facility] to provide transport. ETA 0730. [facility] was  informed of ETA and spoke to Brad Sweet.      Alice Manzo, [facility]  09/06/23 [address] [facility], [facility]  09/06/23 4892

## 2023-09-06 NOTE — DISCHARGE INSTRUCTIONS
Orthopaedic Instructions:  -Weight bearing status: Weight bearing as tolerated with the left leg  -Do not remove dressing to the left hip. Replace canisters as needed. If machine begins beeping, call office or [address] [facility] for replacement.  -Always look for signs of compartment syndrome: pain out of proportion to the injury, pain not controlled with pain medication, numbness in digits, changing of color of digits (paleness). If these signs occur return to ED immediately for reassessment.  -Always work on ankle, knee motion to decrease swelling.  -Ice (20 minutes on and off 1 hour) and elevate to reduce swelling and throbbing pain.  -Call the office or come to Emergency Room if signs of infection appear (hot, swollen, red, draining pus, fever)  -Take medications as prescribed.  -Wean off narcotics (percocet/norco) as soon as possible. Do not take tylenol if still taking narcotics.  -Follow up with Dr. Nancy Pollard in his office on 9/18/23 at 1:30pm. Call [phone] to schedule/confirm or with any questions/concerns.

## 2023-09-06 NOTE — ED NOTES
[facility] at bedside to transport pt back to facility.       Roxanna Orellana RN  09/06/23 2226

## 2023-09-06 NOTE — PLAN OF CARE
Discussed care with Dr. Josué Yousif, plan will be to continue with prevena and canister exchanges as needed. Facility is already aware of how to change canisters. Her hip pain is mostly related to buttock pain which we believe will resolve with PT and increased ROM/strengthening of the left lower extremity. At this time low concern for increasing infection or abscess of the left hip due to downtrending CRP, minimal erythema surrounding incision, and ability to ambulate on the leg. Patient is having significant drainage due to the placement of the antibiotic beads during hip I&D on 8/21/23. No concern at this time that drainage is purulent.     -No acute orthopedic surgical intervention at this time.  -New prevena and canister placed today. Please measure and record output every shift. Okay to reinforce seal if needed per nursing with tegaderms.  - WBAT  to the LLE  - CRP: 27, ESR: 39   - Antibiotics per infectious disease recommendations  - Pain control and medical management per emergency department   -Okay to discharge back to facility. Recommend increased PT and appropriate pain management including stretching, ice, heat, altered pain medication regimen.  -Will have patient follow up on 9/18/23 with Dr. Josué Yousif for wound-check and evaluation.  - Please contact Ortho on call with any questions    Angy Anderson DO  Orthopedic Surgery Resident, PGY-2  [place], [place]

## 2023-09-06 NOTE — ED PROVIDER NOTES
Eosinophils Absolute 0.17   Basophils Absolute 0.04   Absolute Immature Granulocyte 0.03  Unremarkable [BG]   7661 Basic Metabolic Panel(!):    Sodium 139   Potassium 4.3   Chloride 105   CO2 26   Anion Gap 8(!)   Glucose, Random 140(!)   BUN,BUNPL 8   Creatinine 0.5   Est, Glom Filt Rate >60   CALCIUM, SERUM, 386673 8.5(!)  Unremarkable [BG]   0329 C-Reactive Protein(!):    CRP 27.0(!)  Elevated at 27.0, improved from prior results of 91.6 and 136.9 on 8/25 and 8/26 respectively  [BG]   0331 Lactic Acid:    Lactic Acid, Whole Blood 0.8  Within normal limits [BG]   0416 XR HIP LEFT (2-3 VIEWS)  Postsurgical changes of left total hip arthroplasty without evidence of  hardware complication. Open wound VAC overlies the lateral soft tissues. [BG]   0445 Sedimentation Rate(!):    Sed Rate 39(!)  Elevated at 39, previously 25 on 8/20. [BG]   [id number] Patient to be discharged per orthopedic surgery. Keep scheduled follow-up appointments. [BG]      ED Course User Index  [BG] Madonna Ríos MD       PROCEDURES:  None    CONSULTS:  IP CONSULT TO ORTHOPEDIC SURGERY    FINAL IMPRESSION      1. Left hip pain          DISPOSITION / PLAN     DISPOSITION Decision To Discharge 09/06/2023 06:07:05 AM      PATIENT REFERRED TO:  No follow-up provider specified.     DISCHARGE MEDICATIONS:  New Prescriptions    No medications on file       Madonna Ríos MD  Emergency Medicine Resident    (Please note that portions of thisnote were completed with a voice recognition program.  Efforts were made to edit the dictations but occasionally words are mis-transcribed.)        Madonna Ríos MD  Resident  09/06/23 1502

## 2023-09-11 ENCOUNTER — TELEPHONE (OUTPATIENT)
Dept: ORTHOPEDIC SURGERY | Age: 43
End: 2023-09-11

## 2023-09-11 NOTE — TELEPHONE ENCOUNTER
Ellen with [facility] called to report patients Wound vac was not removed during visit on 9/6 and asking if was extended or if it needs to be removed?  Call back number  [phone]

## 2023-09-11 NOTE — TELEPHONE ENCOUNTER
This patient had a new wound vac placed in the ED on 9/6/2023. Follow up is 9/18/2023. Will that be ok.

## 2023-09-18 ENCOUNTER — OFFICE VISIT (OUTPATIENT)
Dept: ORTHOPEDIC SURGERY | Age: 43
End: 2023-09-18

## 2023-09-18 ENCOUNTER — HOSPITAL ENCOUNTER (OUTPATIENT)
Age: 43
Discharge: HOME | End: 2023-09-18

## 2023-09-18 ENCOUNTER — TELEPHONE (OUTPATIENT)
Dept: NEUROSURGERY | Age: 43
End: 2023-09-18

## 2023-09-18 VITALS — WEIGHT: 260 LBS | HEIGHT: 68 IN | BODY MASS INDEX: 39.4 KG/M2

## 2023-09-18 DIAGNOSIS — T81.42XA DEEP INCISIONAL SURGICAL SITE INFECTION: Primary | ICD-10-CM

## 2023-09-18 DIAGNOSIS — T81.42XA DEEP INCISIONAL SURGICAL SITE INFECTION: ICD-10-CM

## 2023-09-18 NOTE — PROGRESS NOTES
and sound a like substitutions which may escape proof reading.   In such instances, actual meaning can be extrapolated by contextual diversion

## 2023-09-26 ENCOUNTER — OFFICE VISIT (OUTPATIENT)
Dept: INFECTIOUS DISEASES | Age: 43
End: 2023-09-26
Payer: MEDICARE

## 2023-09-26 VITALS
HEART RATE: 93 BPM | WEIGHT: 260 LBS | DIASTOLIC BLOOD PRESSURE: 71 MMHG | TEMPERATURE: 97.3 F | HEIGHT: 68 IN | SYSTOLIC BLOOD PRESSURE: 90 MMHG | BODY MASS INDEX: 39.4 KG/M2

## 2023-09-26 DIAGNOSIS — L02.416 ABSCESS OF LEFT HIP: ICD-10-CM

## 2023-09-26 DIAGNOSIS — S72.002G CLOSED FRACTURE OF LEFT HIP WITH DELAYED HEALING, SUBSEQUENT ENCOUNTER: ICD-10-CM

## 2023-09-26 DIAGNOSIS — A49.01 STAPH AUREUS INFECTION: Primary | ICD-10-CM

## 2023-09-26 PROCEDURE — 3074F SYST BP LT 130 MM HG: CPT | Performed by: INTERNAL MEDICINE

## 2023-09-26 PROCEDURE — G8417 CALC BMI ABV UP PARAM F/U: HCPCS | Performed by: INTERNAL MEDICINE

## 2023-09-26 PROCEDURE — 1036F TOBACCO NON-USER: CPT | Performed by: INTERNAL MEDICINE

## 2023-09-26 PROCEDURE — G8427 DOCREV CUR MEDS BY ELIG CLIN: HCPCS | Performed by: INTERNAL MEDICINE

## 2023-09-26 PROCEDURE — 99213 OFFICE O/P EST LOW 20 MIN: CPT | Performed by: INTERNAL MEDICINE

## 2023-09-26 PROCEDURE — 3078F DIAST BP <80 MM HG: CPT | Performed by: INTERNAL MEDICINE

## 2023-09-26 NOTE — PROGRESS NOTES
[facility] [address] [place] [address] Visit Note  Today's Date and Time: 9/25/2023, 2:00 PM    Impression :   Left hip abscess/surgical wound dehiscence. MSSA  S/p left hip cecilio arthroplasty 7-19-23    Recommendations:     Increased Ceftriaxone to 2 gm IV q 12 hrs for Lt hip MSSA abscess- Stop date 10-21-23  Follow-up with Dr. Michael Morel, with Ortho, as scheduled  Follow-up with Dr. Francisco Javier Flores in 3 weeks    Medical Decision Making/Summary/Discussion:9/25/2023       Infection Control Recommendations   [place] Precautions  Contact isolation    Antimicrobial Stewardship Recommendations     Simplification of therapy  Targeted therapy    Coordination of Outpatient Care:   Estimated Length of IV antimicrobials: 10-21-23  Patient will need Midline Catheter Insertion: Yes  Patient will need PICC line Insertion:TBD  Patient will need: Home IV , [address] [place],  SNF,  LTAC: TBD  Patient will need outpatient wound care: Yes    Chief complaint/reason for consultation:   Left hip abscess      History of Present Illness:   Vinay Sandoval is a 39y.o.-year-old female who was initially admitted on (Not on file). Patient seen at the request of Dr. Penny English:    Patient has a past medical history of prior MRSA infection, anxiety, alcohol abuse, history of emigdio-en-y gastric bypass, bipolar type 1, and drug abuse. She presents with a chief complaint of left hip surgical wound dehiscence. Patient had a left hip hemiarthroplasty on 7/19/2023. Wound staples were removed on 8/2/23. Patient states that wound was closed at time of staple removal. However, after about a week the incision started to separate and eventually started to drain. Her wound nurse measured an opening approximately 6 cm deep. The RN contacted orthopedic surgeon who sent patient to the emergency department for observation.       Patient states that drainage has been seen from the wound which she describes as being milky
for input(s): \"HIV\" in the last 72 hours. No results for input(s): \"BC\" in the last 72 hours. Lab Results   Component Value Date/Time    BACTERIA None 07/18/2023 10:00 PM    MUCUS NOT REPORTED 02/01/2022 11:50 PM    RBC 2.99 09/06/2023 02:48 AM    RBC 4.43 06/03/2012 04:17 AM    TRICHOMONAS NOT REPORTED 02/01/2022 11:50 PM    WBC 8.5 09/06/2023 02:48 AM    YEAST FEW 03/12/2022 09:10 AM    TURBIDITY Clear 07/18/2023 10:00 PM     Lab Results   Component Value Date/Time    CREATININE 0.5 09/06/2023 02:48 AM    GLUCOSE 140 09/06/2023 02:48 AM    GLUCOSE 403 06/03/2012 04:17 AM       Medical Decision Making-Imaging:   XR HIP 2-3 VW W PELVIS LEFT    Result Date: 8/20/2023  EXAMINATION: ONE XRAY VIEW OF THE PELVIS AND 2 XRAY VIEWS LEFT HIP 8/20/2023 7:59 pm COMPARISON: Radiographs of left hip joint and pelvis on 8/7/2023 HISTORY: ORDERING SYSTEM PROVIDED HISTORY: concern for post operative infection TECHNOLOGIST PROVIDED HISTORY: Concern for post operative infection Reason for Exam: hip surgery 7/19/23,incision now red,painful,seeping FINDINGS: Redemonstration of intact left hip arthroplasty without any fracture or malalignment and without interval change. On rest of AP view of pelvis there is no fracture or malalignment. Right hip joint appears to be normal.  Evidence of moderate to marked multilevel spondylosis in the visualized lower lumbar spine and lumbosacral junction. Unchanged, intact left knee arthroplasty without fracture or malalignment. On rest of AP view of pelvis there is no acute process or change. Medical Decision [id number]:     Results       ** No results found for the last 336 hours. **              Medical Decision Making-Other:     Note:    Thank you for allowing us to participate in the care of this patient. Please call with questions.       MD Cornelia Benítez, MS4 contributed to the evaluation of the patient under observation        ATTESTATION:    I have

## 2023-10-04 ENCOUNTER — OFFICE VISIT (OUTPATIENT)
Dept: ORTHOPEDIC SURGERY | Age: 43
End: 2023-10-04

## 2023-10-04 VITALS — WEIGHT: 260 LBS | BODY MASS INDEX: 39.4 KG/M2 | HEIGHT: 68 IN

## 2023-10-04 DIAGNOSIS — T81.42XA DEEP INCISIONAL SURGICAL SITE INFECTION: Primary | ICD-10-CM

## 2023-10-04 PROCEDURE — 99024 POSTOP FOLLOW-UP VISIT: CPT | Performed by: STUDENT IN AN ORGANIZED HEALTH CARE EDUCATION/TRAINING PROGRAM

## 2023-10-17 NOTE — PROGRESS NOTES
fracture or malalignment. Right hip joint appears to be normal.  Evidence of moderate to marked multilevel spondylosis in the visualized lower lumbar spine and lumbosacral junction. Unchanged, intact left knee arthroplasty without fracture or malalignment. On rest of AP view of pelvis there is no acute process or change. Medical Decision [id number]-Xtrhd:     Results       ** No results found for the last 336 hours. **              Medical Decision Making-Other:     Note:    Thank you for allowing us to participate in the care of this patient. Please call with questions. Alison Pearl MD    Documentation:      Total Time: minutes: 11-20 minutes    Dede Grullon was evaluated through a synchronous (real-time) audio encounter. Patient identification was verified at the start of the visit. She (or guardian if applicable) is aware that this is a billable service, which includes applicable co-pays. This visit was conducted with the patient's (and/or legal guardian's) verbal consent. She has not had a related appointment within my department in the past 7 days or scheduled within the next 24 hours. The patient was located at [facility] ([address] [place] [address]): [address] [place] [address],  [address]. The provider was located at [facility] ([address] [place] [address]): [address],  [address]. Note: not billable if this call serves to triage the patient into an appointment for the relevant concern        Dede Grullon is a 39 y.o. female evaluated via telephone on 10/19/2023 for Frequent Infections (Fu staph aureus)    Patient evaluated for Lt hip prosthetic joint infection with failure to medical treatment.       Kel Mata MD

## 2023-10-18 ENCOUNTER — OFFICE VISIT (OUTPATIENT)
Dept: ORTHOPEDIC SURGERY | Age: 43
End: 2023-10-18
Payer: MEDICARE

## 2023-10-18 VITALS — WEIGHT: 260 LBS | HEIGHT: 68 IN | BODY MASS INDEX: 39.4 KG/M2

## 2023-10-18 DIAGNOSIS — S72.002D CLOSED FRACTURE OF LEFT HIP WITH ROUTINE HEALING, SUBSEQUENT ENCOUNTER: Primary | ICD-10-CM

## 2023-10-18 DIAGNOSIS — T81.42XA DEEP INCISIONAL SURGICAL SITE INFECTION: ICD-10-CM

## 2023-10-18 PROCEDURE — G8484 FLU IMMUNIZE NO ADMIN: HCPCS | Performed by: STUDENT IN AN ORGANIZED HEALTH CARE EDUCATION/TRAINING PROGRAM

## 2023-10-18 PROCEDURE — G8427 DOCREV CUR MEDS BY ELIG CLIN: HCPCS | Performed by: STUDENT IN AN ORGANIZED HEALTH CARE EDUCATION/TRAINING PROGRAM

## 2023-10-18 PROCEDURE — 1036F TOBACCO NON-USER: CPT | Performed by: STUDENT IN AN ORGANIZED HEALTH CARE EDUCATION/TRAINING PROGRAM

## 2023-10-18 PROCEDURE — 99214 OFFICE O/P EST MOD 30 MIN: CPT | Performed by: STUDENT IN AN ORGANIZED HEALTH CARE EDUCATION/TRAINING PROGRAM

## 2023-10-18 PROCEDURE — G8417 CALC BMI ABV UP PARAM F/U: HCPCS | Performed by: STUDENT IN AN ORGANIZED HEALTH CARE EDUCATION/TRAINING PROGRAM

## 2023-10-19 ENCOUNTER — SCHEDULED TELEPHONE ENCOUNTER (OUTPATIENT)
Dept: INFECTIOUS DISEASES | Age: 43
End: 2023-10-19

## 2023-10-19 ENCOUNTER — TELEPHONE (OUTPATIENT)
Dept: INFECTIOUS DISEASES | Age: 43
End: 2023-10-19

## 2023-10-19 DIAGNOSIS — Z96.649 INFECTION OF PROSTHETIC HIP JOINT, SUBSEQUENT ENCOUNTER: ICD-10-CM

## 2023-10-19 DIAGNOSIS — A49.01 STAPH AUREUS INFECTION: Primary | ICD-10-CM

## 2023-10-19 DIAGNOSIS — L02.416 ABSCESS OF LEFT HIP: ICD-10-CM

## 2023-10-19 DIAGNOSIS — S72.002G CLOSED FRACTURE OF LEFT HIP WITH DELAYED HEALING, SUBSEQUENT ENCOUNTER: ICD-10-CM

## 2023-10-19 DIAGNOSIS — T84.59XD INFECTION OF PROSTHETIC HIP JOINT, SUBSEQUENT ENCOUNTER: ICD-10-CM

## 2023-10-19 NOTE — PATIENT INSTRUCTIONS
[address] [facility], [phone]. Gave orders to [address] to extend until 10/23/23. Pt to be admitted 10/24/23.  Inocencio Polanco

## 2023-10-19 NOTE — TELEPHONE ENCOUNTER
Pt canceled her last appt because she wasn't feeling well, was rescheduled to 10/19/23 - pt calling today (10/19) because her RN informed her transportation was not set up. Pt states she was suppose to start on an oral AB - & is scheduled for another hip surgery- \"they are going to cut her hip back up\"  is a VV an option for her? Please advise.

## 2023-10-23 ENCOUNTER — ANESTHESIA EVENT (OUTPATIENT)
Dept: OPERATING ROOM | Age: 43
DRG: 857 | End: 2023-10-23
Payer: MEDICARE

## 2023-10-23 RX ORDER — BUPRENORPHINE 5 UG/H
1 PATCH TRANSDERMAL WEEKLY
COMMUNITY

## 2023-10-23 RX ORDER — OXYCODONE HYDROCHLORIDE AND ACETAMINOPHEN 5; 325 MG/1; MG/1
2 TABLET ORAL EVERY 4 HOURS PRN
COMMUNITY

## 2023-10-23 RX ORDER — BACLOFEN 10 MG/1
20 TABLET ORAL EVERY 8 HOURS
COMMUNITY

## 2023-10-23 RX ORDER — OXYCODONE HYDROCHLORIDE AND ACETAMINOPHEN 5; 325 MG/1; MG/1
1 TABLET ORAL EVERY 4 HOURS PRN
COMMUNITY

## 2023-10-24 ENCOUNTER — HOSPITAL ENCOUNTER (INPATIENT)
Age: 43
LOS: 2 days | Discharge: NURSING, REHAB OR LONG-TERM CARE | DRG: 857 | End: 2023-10-26
Attending: STUDENT IN AN ORGANIZED HEALTH CARE EDUCATION/TRAINING PROGRAM | Admitting: STUDENT IN AN ORGANIZED HEALTH CARE EDUCATION/TRAINING PROGRAM
Payer: MEDICARE

## 2023-10-24 ENCOUNTER — ANESTHESIA (OUTPATIENT)
Dept: OPERATING ROOM | Age: 43
DRG: 857 | End: 2023-10-24
Payer: MEDICARE

## 2023-10-24 DIAGNOSIS — T81.49XA SURGICAL SITE INFECTION: ICD-10-CM

## 2023-10-24 DIAGNOSIS — G89.18 POST-OP PAIN: Primary | ICD-10-CM

## 2023-10-24 LAB — GLUCOSE BLD-MCNC: 121 MG/DL (ref 65–105)

## 2023-10-24 PROCEDURE — 3600000004 HC SURGERY LEVEL 4 BASE: Performed by: STUDENT IN AN ORGANIZED HEALTH CARE EDUCATION/TRAINING PROGRAM

## 2023-10-24 PROCEDURE — 2580000003 HC RX 258: Performed by: STUDENT IN AN ORGANIZED HEALTH CARE EDUCATION/TRAINING PROGRAM

## 2023-10-24 PROCEDURE — 7100000001 HC PACU RECOVERY - ADDTL 15 MIN: Performed by: STUDENT IN AN ORGANIZED HEALTH CARE EDUCATION/TRAINING PROGRAM

## 2023-10-24 PROCEDURE — 2709999900 HC NON-CHARGEABLE SUPPLY: Performed by: STUDENT IN AN ORGANIZED HEALTH CARE EDUCATION/TRAINING PROGRAM

## 2023-10-24 PROCEDURE — 2580000003 HC RX 258: Performed by: ANESTHESIOLOGY

## 2023-10-24 PROCEDURE — 13160 SEC CLSR SURG WND/DEHSN XTN: CPT | Performed by: STUDENT IN AN ORGANIZED HEALTH CARE EDUCATION/TRAINING PROGRAM

## 2023-10-24 PROCEDURE — 2500000003 HC RX 250 WO HCPCS

## 2023-10-24 PROCEDURE — 87186 SC STD MICRODIL/AGAR DIL: CPT

## 2023-10-24 PROCEDURE — 6370000000 HC RX 637 (ALT 250 FOR IP)

## 2023-10-24 PROCEDURE — 6360000002 HC RX W HCPCS

## 2023-10-24 PROCEDURE — 87206 SMEAR FLUORESCENT/ACID STAI: CPT

## 2023-10-24 PROCEDURE — 87205 SMEAR GRAM STAIN: CPT

## 2023-10-24 PROCEDURE — 87076 CULTURE ANAEROBE IDENT EACH: CPT

## 2023-10-24 PROCEDURE — 3700000001 HC ADD 15 MINUTES (ANESTHESIA): Performed by: STUDENT IN AN ORGANIZED HEALTH CARE EDUCATION/TRAINING PROGRAM

## 2023-10-24 PROCEDURE — 11044 DBRDMT BONE 1ST 20 SQ CM/<: CPT | Performed by: STUDENT IN AN ORGANIZED HEALTH CARE EDUCATION/TRAINING PROGRAM

## 2023-10-24 PROCEDURE — 86403 PARTICLE AGGLUT ANTBDY SCRN: CPT

## 2023-10-24 PROCEDURE — 87185 SC STD ENZYME DETCJ PER NZM: CPT

## 2023-10-24 PROCEDURE — 87102 FUNGUS ISOLATION CULTURE: CPT

## 2023-10-24 PROCEDURE — 6360000002 HC RX W HCPCS: Performed by: STUDENT IN AN ORGANIZED HEALTH CARE EDUCATION/TRAINING PROGRAM

## 2023-10-24 PROCEDURE — 6370000000 HC RX 637 (ALT 250 FOR IP): Performed by: STUDENT IN AN ORGANIZED HEALTH CARE EDUCATION/TRAINING PROGRAM

## 2023-10-24 PROCEDURE — 6360000002 HC RX W HCPCS: Performed by: ANESTHESIOLOGY

## 2023-10-24 PROCEDURE — 87075 CULTR BACTERIA EXCEPT BLOOD: CPT

## 2023-10-24 PROCEDURE — 7100000000 HC PACU RECOVERY - FIRST 15 MIN: Performed by: STUDENT IN AN ORGANIZED HEALTH CARE EDUCATION/TRAINING PROGRAM

## 2023-10-24 PROCEDURE — 11047 DBRDMT BONE EACH ADDL: CPT | Performed by: STUDENT IN AN ORGANIZED HEALTH CARE EDUCATION/TRAINING PROGRAM

## 2023-10-24 PROCEDURE — 87070 CULTURE OTHR SPECIMN AEROBIC: CPT

## 2023-10-24 PROCEDURE — 1200000000 HC SEMI PRIVATE

## 2023-10-24 PROCEDURE — 0QB70ZZ EXCISION OF LEFT UPPER FEMUR, OPEN APPROACH: ICD-10-PCS | Performed by: STUDENT IN AN ORGANIZED HEALTH CARE EDUCATION/TRAINING PROGRAM

## 2023-10-24 PROCEDURE — 82947 ASSAY GLUCOSE BLOOD QUANT: CPT

## 2023-10-24 PROCEDURE — 3700000000 HC ANESTHESIA ATTENDED CARE: Performed by: STUDENT IN AN ORGANIZED HEALTH CARE EDUCATION/TRAINING PROGRAM

## 2023-10-24 PROCEDURE — 3600000014 HC SURGERY LEVEL 4 ADDTL 15MIN: Performed by: STUDENT IN AN ORGANIZED HEALTH CARE EDUCATION/TRAINING PROGRAM

## 2023-10-24 RX ORDER — SODIUM CHLORIDE 0.9 % (FLUSH) 0.9 %
5-40 SYRINGE (ML) INJECTION PRN
Status: DISCONTINUED | OUTPATIENT
Start: 2023-10-24 | End: 2023-10-24 | Stop reason: HOSPADM

## 2023-10-24 RX ORDER — ONDANSETRON 2 MG/ML
4 INJECTION INTRAMUSCULAR; INTRAVENOUS EVERY 6 HOURS PRN
Status: DISCONTINUED | OUTPATIENT
Start: 2023-10-24 | End: 2023-10-26 | Stop reason: HOSPADM

## 2023-10-24 RX ORDER — TRANEXAMIC ACID 10 MG/ML
INJECTION, SOLUTION INTRAVENOUS PRN
Status: DISCONTINUED | OUTPATIENT
Start: 2023-10-24 | End: 2023-10-24 | Stop reason: SDUPTHER

## 2023-10-24 RX ORDER — SODIUM CHLORIDE 9 MG/ML
INJECTION, SOLUTION INTRAVENOUS PRN
Status: DISCONTINUED | OUTPATIENT
Start: 2023-10-24 | End: 2023-10-24 | Stop reason: HOSPADM

## 2023-10-24 RX ORDER — PROPOFOL 10 MG/ML
INJECTION, EMULSION INTRAVENOUS PRN
Status: DISCONTINUED | OUTPATIENT
Start: 2023-10-24 | End: 2023-10-24 | Stop reason: SDUPTHER

## 2023-10-24 RX ORDER — ACETAMINOPHEN 500 MG
1000 TABLET ORAL EVERY 8 HOURS SCHEDULED
Status: DISCONTINUED | OUTPATIENT
Start: 2023-10-24 | End: 2023-10-26 | Stop reason: HOSPADM

## 2023-10-24 RX ORDER — DEXAMETHASONE SODIUM PHOSPHATE 10 MG/ML
INJECTION INTRAMUSCULAR; INTRAVENOUS PRN
Status: DISCONTINUED | OUTPATIENT
Start: 2023-10-24 | End: 2023-10-24 | Stop reason: SDUPTHER

## 2023-10-24 RX ORDER — KETOROLAC TROMETHAMINE 30 MG/ML
30 INJECTION, SOLUTION INTRAMUSCULAR; INTRAVENOUS EVERY 6 HOURS
Status: DISCONTINUED | OUTPATIENT
Start: 2023-10-24 | End: 2023-10-26 | Stop reason: HOSPADM

## 2023-10-24 RX ORDER — METOCLOPRAMIDE HYDROCHLORIDE 5 MG/ML
10 INJECTION INTRAMUSCULAR; INTRAVENOUS
Status: DISCONTINUED | OUTPATIENT
Start: 2023-10-24 | End: 2023-10-24 | Stop reason: HOSPADM

## 2023-10-24 RX ORDER — GABAPENTIN 100 MG/1
100 CAPSULE ORAL 3 TIMES DAILY
Status: DISCONTINUED | OUTPATIENT
Start: 2023-10-24 | End: 2023-10-26 | Stop reason: HOSPADM

## 2023-10-24 RX ORDER — SODIUM CHLORIDE 9 MG/ML
INJECTION, SOLUTION INTRAVENOUS PRN
Status: DISCONTINUED | OUTPATIENT
Start: 2023-10-24 | End: 2023-10-26 | Stop reason: HOSPADM

## 2023-10-24 RX ORDER — MEPERIDINE HYDROCHLORIDE 50 MG/ML
12.5 INJECTION INTRAMUSCULAR; INTRAVENOUS; SUBCUTANEOUS EVERY 5 MIN PRN
Status: DISCONTINUED | OUTPATIENT
Start: 2023-10-24 | End: 2023-10-24 | Stop reason: HOSPADM

## 2023-10-24 RX ORDER — ONDANSETRON 2 MG/ML
INJECTION INTRAMUSCULAR; INTRAVENOUS PRN
Status: DISCONTINUED | OUTPATIENT
Start: 2023-10-24 | End: 2023-10-24 | Stop reason: SDUPTHER

## 2023-10-24 RX ORDER — OXYCODONE HYDROCHLORIDE 5 MG/1
5 TABLET ORAL EVERY 4 HOURS PRN
Status: DISCONTINUED | OUTPATIENT
Start: 2023-10-24 | End: 2023-10-26 | Stop reason: HOSPADM

## 2023-10-24 RX ORDER — SODIUM CHLORIDE 0.9 % (FLUSH) 0.9 %
5-40 SYRINGE (ML) INJECTION PRN
Status: DISCONTINUED | OUTPATIENT
Start: 2023-10-24 | End: 2023-10-26 | Stop reason: HOSPADM

## 2023-10-24 RX ORDER — MIDAZOLAM HYDROCHLORIDE 1 MG/ML
INJECTION INTRAMUSCULAR; INTRAVENOUS
Status: COMPLETED
Start: 2023-10-24 | End: 2023-10-24

## 2023-10-24 RX ORDER — MAGNESIUM HYDROXIDE 1200 MG/15ML
LIQUID ORAL CONTINUOUS PRN
Status: DISCONTINUED | OUTPATIENT
Start: 2023-10-24 | End: 2023-10-24 | Stop reason: HOSPADM

## 2023-10-24 RX ORDER — SODIUM CHLORIDE 0.9 % (FLUSH) 0.9 %
5-40 SYRINGE (ML) INJECTION EVERY 12 HOURS SCHEDULED
Status: DISCONTINUED | OUTPATIENT
Start: 2023-10-24 | End: 2023-10-24 | Stop reason: HOSPADM

## 2023-10-24 RX ORDER — POLYETHYLENE GLYCOL 3350 17 G/17G
17 POWDER, FOR SOLUTION ORAL DAILY PRN
Status: DISCONTINUED | OUTPATIENT
Start: 2023-10-24 | End: 2023-10-26 | Stop reason: HOSPADM

## 2023-10-24 RX ORDER — TOBRAMYCIN 1.2 G/30ML
INJECTION, POWDER, LYOPHILIZED, FOR SOLUTION INTRAVENOUS PRN
Status: DISCONTINUED | OUTPATIENT
Start: 2023-10-24 | End: 2023-10-24 | Stop reason: HOSPADM

## 2023-10-24 RX ORDER — SODIUM CHLORIDE 9 MG/ML
INJECTION, SOLUTION INTRAVENOUS CONTINUOUS
Status: DISCONTINUED | OUTPATIENT
Start: 2023-10-24 | End: 2023-10-26 | Stop reason: HOSPADM

## 2023-10-24 RX ORDER — SODIUM CHLORIDE, SODIUM LACTATE, POTASSIUM CHLORIDE, CALCIUM CHLORIDE 600; 310; 30; 20 MG/100ML; MG/100ML; MG/100ML; MG/100ML
INJECTION, SOLUTION INTRAVENOUS CONTINUOUS
Status: DISCONTINUED | OUTPATIENT
Start: 2023-10-24 | End: 2023-10-24 | Stop reason: HOSPADM

## 2023-10-24 RX ORDER — LIDOCAINE HYDROCHLORIDE 10 MG/ML
INJECTION, SOLUTION EPIDURAL; INFILTRATION; INTRACAUDAL; PERINEURAL PRN
Status: DISCONTINUED | OUTPATIENT
Start: 2023-10-24 | End: 2023-10-24 | Stop reason: SDUPTHER

## 2023-10-24 RX ORDER — LABETALOL HYDROCHLORIDE 5 MG/ML
INJECTION, SOLUTION INTRAVENOUS PRN
Status: DISCONTINUED | OUTPATIENT
Start: 2023-10-24 | End: 2023-10-24 | Stop reason: SDUPTHER

## 2023-10-24 RX ORDER — CYCLOBENZAPRINE HCL 10 MG
10 TABLET ORAL EVERY 6 HOURS
Status: DISCONTINUED | OUTPATIENT
Start: 2023-10-24 | End: 2023-10-26 | Stop reason: HOSPADM

## 2023-10-24 RX ORDER — SODIUM CHLORIDE 0.9 % (FLUSH) 0.9 %
5-40 SYRINGE (ML) INJECTION EVERY 12 HOURS SCHEDULED
Status: DISCONTINUED | OUTPATIENT
Start: 2023-10-24 | End: 2023-10-26 | Stop reason: HOSPADM

## 2023-10-24 RX ORDER — ENOXAPARIN SODIUM 100 MG/ML
40 INJECTION SUBCUTANEOUS DAILY
Status: DISCONTINUED | OUTPATIENT
Start: 2023-10-25 | End: 2023-10-26 | Stop reason: HOSPADM

## 2023-10-24 RX ORDER — DIPHENHYDRAMINE HYDROCHLORIDE 50 MG/ML
12.5 INJECTION INTRAMUSCULAR; INTRAVENOUS
Status: DISCONTINUED | OUTPATIENT
Start: 2023-10-24 | End: 2023-10-24 | Stop reason: HOSPADM

## 2023-10-24 RX ORDER — KETOROLAC TROMETHAMINE 30 MG/ML
INJECTION, SOLUTION INTRAMUSCULAR; INTRAVENOUS PRN
Status: DISCONTINUED | OUTPATIENT
Start: 2023-10-24 | End: 2023-10-24 | Stop reason: SDUPTHER

## 2023-10-24 RX ORDER — CEFAZOLIN SODIUM 2 G/50ML
SOLUTION INTRAVENOUS PRN
Status: DISCONTINUED | OUTPATIENT
Start: 2023-10-24 | End: 2023-10-24 | Stop reason: SDUPTHER

## 2023-10-24 RX ORDER — VANCOMYCIN HYDROCHLORIDE 1 G/20ML
INJECTION, POWDER, LYOPHILIZED, FOR SOLUTION INTRAVENOUS PRN
Status: DISCONTINUED | OUTPATIENT
Start: 2023-10-24 | End: 2023-10-24 | Stop reason: HOSPADM

## 2023-10-24 RX ORDER — OXYCODONE HYDROCHLORIDE 5 MG/1
10 TABLET ORAL EVERY 4 HOURS PRN
Status: DISCONTINUED | OUTPATIENT
Start: 2023-10-24 | End: 2023-10-26 | Stop reason: HOSPADM

## 2023-10-24 RX ORDER — HYDRALAZINE HYDROCHLORIDE 20 MG/ML
10 INJECTION INTRAMUSCULAR; INTRAVENOUS
Status: DISCONTINUED | OUTPATIENT
Start: 2023-10-24 | End: 2023-10-24 | Stop reason: HOSPADM

## 2023-10-24 RX ORDER — FENTANYL CITRATE 50 UG/ML
INJECTION, SOLUTION INTRAMUSCULAR; INTRAVENOUS PRN
Status: DISCONTINUED | OUTPATIENT
Start: 2023-10-24 | End: 2023-10-24 | Stop reason: SDUPTHER

## 2023-10-24 RX ORDER — DROPERIDOL 2.5 MG/ML
0.62 INJECTION, SOLUTION INTRAMUSCULAR; INTRAVENOUS
Status: DISCONTINUED | OUTPATIENT
Start: 2023-10-24 | End: 2023-10-24 | Stop reason: HOSPADM

## 2023-10-24 RX ORDER — ONDANSETRON 4 MG/1
4 TABLET, ORALLY DISINTEGRATING ORAL EVERY 8 HOURS PRN
Status: DISCONTINUED | OUTPATIENT
Start: 2023-10-24 | End: 2023-10-26 | Stop reason: HOSPADM

## 2023-10-24 RX ORDER — CEFTRIAXONE 2 G/1
2000 INJECTION, POWDER, FOR SOLUTION INTRAMUSCULAR; INTRAVENOUS 2 TIMES DAILY
COMMUNITY

## 2023-10-24 RX ORDER — MIDAZOLAM HYDROCHLORIDE 2 MG/2ML
1 INJECTION, SOLUTION INTRAMUSCULAR; INTRAVENOUS EVERY 10 MIN PRN
Status: COMPLETED | OUTPATIENT
Start: 2023-10-24 | End: 2023-10-24

## 2023-10-24 RX ORDER — MIDAZOLAM HYDROCHLORIDE 2 MG/2ML
2 INJECTION, SOLUTION INTRAMUSCULAR; INTRAVENOUS ONCE
Status: COMPLETED | OUTPATIENT
Start: 2023-10-24 | End: 2023-10-24

## 2023-10-24 RX ORDER — ROCURONIUM BROMIDE 10 MG/ML
INJECTION, SOLUTION INTRAVENOUS PRN
Status: DISCONTINUED | OUTPATIENT
Start: 2023-10-24 | End: 2023-10-24 | Stop reason: SDUPTHER

## 2023-10-24 RX ADMIN — FENTANYL CITRATE 25 MCG: 50 INJECTION, SOLUTION INTRAMUSCULAR; INTRAVENOUS at 14:20

## 2023-10-24 RX ADMIN — MIDAZOLAM HYDROCHLORIDE 1 MG: 1 INJECTION, SOLUTION INTRAMUSCULAR; INTRAVENOUS at 15:20

## 2023-10-24 RX ADMIN — GABAPENTIN 100 MG: 100 CAPSULE ORAL at 16:25

## 2023-10-24 RX ADMIN — HYDROMORPHONE HYDROCHLORIDE 0.5 MG: 1 INJECTION, SOLUTION INTRAMUSCULAR; INTRAVENOUS; SUBCUTANEOUS at 15:08

## 2023-10-24 RX ADMIN — HYDROMORPHONE HYDROCHLORIDE 0.5 MG: 1 INJECTION, SOLUTION INTRAMUSCULAR; INTRAVENOUS; SUBCUTANEOUS at 15:03

## 2023-10-24 RX ADMIN — MEPERIDINE HYDROCHLORIDE 12.5 MG: 50 INJECTION, SOLUTION INTRAMUSCULAR; INTRAVENOUS; SUBCUTANEOUS at 14:59

## 2023-10-24 RX ADMIN — SUGAMMADEX 200 MG: 100 INJECTION, SOLUTION INTRAVENOUS at 14:26

## 2023-10-24 RX ADMIN — Medication 5 MG: at 13:56

## 2023-10-24 RX ADMIN — Medication 10 MG: at 13:12

## 2023-10-24 RX ADMIN — ONDANSETRON 4 MG: 2 INJECTION INTRAMUSCULAR; INTRAVENOUS at 13:03

## 2023-10-24 RX ADMIN — SODIUM CHLORIDE, POTASSIUM CHLORIDE, SODIUM LACTATE AND CALCIUM CHLORIDE: 600; 310; 30; 20 INJECTION, SOLUTION INTRAVENOUS at 09:40

## 2023-10-24 RX ADMIN — MIDAZOLAM HYDROCHLORIDE 2 MG: 1 INJECTION, SOLUTION INTRAMUSCULAR; INTRAVENOUS at 09:54

## 2023-10-24 RX ADMIN — LIDOCAINE HYDROCHLORIDE 50 MG: 10 INJECTION, SOLUTION EPIDURAL; INFILTRATION; INTRACAUDAL; PERINEURAL at 12:38

## 2023-10-24 RX ADMIN — SODIUM CHLORIDE, POTASSIUM CHLORIDE, SODIUM LACTATE AND CALCIUM CHLORIDE: 600; 310; 30; 20 INJECTION, SOLUTION INTRAVENOUS at 14:29

## 2023-10-24 RX ADMIN — PROPOFOL 200 MG: 10 INJECTION, EMULSION INTRAVENOUS at 12:39

## 2023-10-24 RX ADMIN — OXYCODONE HYDROCHLORIDE 10 MG: 5 TABLET ORAL at 16:23

## 2023-10-24 RX ADMIN — HYDROMORPHONE HYDROCHLORIDE 0.5 MG: 1 INJECTION, SOLUTION INTRAMUSCULAR; INTRAVENOUS; SUBCUTANEOUS at 16:03

## 2023-10-24 RX ADMIN — KETOROLAC TROMETHAMINE 30 MG: 30 INJECTION, SOLUTION INTRAMUSCULAR; INTRAVENOUS at 23:05

## 2023-10-24 RX ADMIN — HYDROMORPHONE HYDROCHLORIDE 0.25 MG: 1 INJECTION, SOLUTION INTRAMUSCULAR; INTRAVENOUS; SUBCUTANEOUS at 18:28

## 2023-10-24 RX ADMIN — KETOROLAC TROMETHAMINE 30 MG: 30 INJECTION, SOLUTION INTRAMUSCULAR; INTRAVENOUS at 14:41

## 2023-10-24 RX ADMIN — FENTANYL CITRATE 50 MCG: 50 INJECTION, SOLUTION INTRAMUSCULAR; INTRAVENOUS at 14:06

## 2023-10-24 RX ADMIN — FENTANYL CITRATE 50 MCG: 50 INJECTION, SOLUTION INTRAMUSCULAR; INTRAVENOUS at 12:38

## 2023-10-24 RX ADMIN — HYDROMORPHONE HYDROCHLORIDE 0.5 MG: 1 INJECTION, SOLUTION INTRAMUSCULAR; INTRAVENOUS; SUBCUTANEOUS at 14:54

## 2023-10-24 RX ADMIN — DEXAMETHASONE SODIUM PHOSPHATE 10 MG: 10 INJECTION INTRAMUSCULAR; INTRAVENOUS at 13:03

## 2023-10-24 RX ADMIN — Medication 5 MG: at 13:28

## 2023-10-24 RX ADMIN — ACETAMINOPHEN 1000 MG: 500 TABLET ORAL at 23:05

## 2023-10-24 RX ADMIN — CEFAZOLIN SODIUM 2000 MG: 2 SOLUTION INTRAVENOUS at 13:12

## 2023-10-24 RX ADMIN — GABAPENTIN 100 MG: 100 CAPSULE ORAL at 23:04

## 2023-10-24 RX ADMIN — HYDROMORPHONE HYDROCHLORIDE 0.25 MG: 1 INJECTION, SOLUTION INTRAMUSCULAR; INTRAVENOUS; SUBCUTANEOUS at 18:06

## 2023-10-24 RX ADMIN — Medication 2000 MG: at 23:05

## 2023-10-24 RX ADMIN — CYCLOBENZAPRINE 10 MG: 10 TABLET, FILM COATED ORAL at 16:23

## 2023-10-24 RX ADMIN — SODIUM CHLORIDE, PRESERVATIVE FREE 10 ML: 5 INJECTION INTRAVENOUS at 22:04

## 2023-10-24 RX ADMIN — HYDROMORPHONE HYDROCHLORIDE 0.25 MG: 1 INJECTION, SOLUTION INTRAMUSCULAR; INTRAVENOUS; SUBCUTANEOUS at 20:25

## 2023-10-24 RX ADMIN — MIDAZOLAM HYDROCHLORIDE 1 MG: 1 INJECTION, SOLUTION INTRAMUSCULAR; INTRAVENOUS at 15:28

## 2023-10-24 RX ADMIN — FENTANYL CITRATE 50 MCG: 50 INJECTION, SOLUTION INTRAMUSCULAR; INTRAVENOUS at 13:00

## 2023-10-24 RX ADMIN — ROCURONIUM BROMIDE 50 MG: 10 INJECTION, SOLUTION INTRAVENOUS at 12:41

## 2023-10-24 RX ADMIN — TRANEXAMIC ACID 1 G: 10 INJECTION, SOLUTION INTRAVENOUS at 13:19

## 2023-10-24 RX ADMIN — HYDROMORPHONE HYDROCHLORIDE 0.25 MG: 1 INJECTION, SOLUTION INTRAMUSCULAR; INTRAVENOUS; SUBCUTANEOUS at 20:20

## 2023-10-24 RX ADMIN — SODIUM CHLORIDE: 9 INJECTION, SOLUTION INTRAVENOUS at 22:06

## 2023-10-24 RX ADMIN — OXYCODONE HYDROCHLORIDE 10 MG: 5 TABLET ORAL at 22:04

## 2023-10-24 ASSESSMENT — PAIN SCALES - GENERAL
PAINLEVEL_OUTOF10: 8
PAINLEVEL_OUTOF10: 8
PAINLEVEL_OUTOF10: 10
PAINLEVEL_OUTOF10: 8
PAINLEVEL_OUTOF10: 10
PAINLEVEL_OUTOF10: 8
PAINLEVEL_OUTOF10: 7
PAINLEVEL_OUTOF10: 10
PAINLEVEL_OUTOF10: 10
PAINLEVEL_OUTOF10: 8
PAINLEVEL_OUTOF10: 10
PAINLEVEL_OUTOF10: 10

## 2023-10-24 ASSESSMENT — PAIN DESCRIPTION - LOCATION
LOCATION: HIP
LOCATION: LEG
LOCATION: HIP

## 2023-10-24 ASSESSMENT — PAIN DESCRIPTION - ORIENTATION
ORIENTATION: LEFT

## 2023-10-24 ASSESSMENT — PAIN DESCRIPTION - DESCRIPTORS
DESCRIPTORS: BURNING;SHARP
DESCRIPTORS: ACHING
DESCRIPTORS: BURNING;SHARP

## 2023-10-24 ASSESSMENT — PAIN - FUNCTIONAL ASSESSMENT: PAIN_FUNCTIONAL_ASSESSMENT: 0-10

## 2023-10-24 NOTE — DISCHARGE INSTRUCTIONS
Orthopaedic Instructions:  -Weight bearing status: Weight bearing as tolerated with the left leg  -Starting three days after surgery, okay for daily dressing changes until wound/surgical incision site is dry. Dressing changes can be performed with simple Band-aids or gauze pads secured with tape/ace bandages. Once you no longer see drainage from your wounds on your dressings, it is okay to shower. Do not scrub vigorously, just let water run over wound/surgical sites. Additionally, at this point one no longer needs to change dressings daily. It is important that you do not soak the wound/incision site underwater, though. This includes baths, hot tubs, swimming pools, etc. Do not apply lotions or creams over the incision sites.  -Always look for signs of compartment syndrome: pain out of proportion to the injury, pain not controlled with pain medication, numbness in digits, changing of color of digits (paleness). If these signs occur return to ED immediately for reassessment.  -Always work on toe , ankle motion to decrease swelling.  -Ice (20 minutes on and off 1 hour) to reduce swelling and throbbing pain.  -Should urinate within 8 hours of surgery.  -Call the office or come to Emergency Room if signs of infection appear (hot, swollen, red, draining pus, fever)  -Take medications as prescribed.  -Wean off narcotics (percocet/norco) as soon as possible. Do not take tylenol if still taking narcotics.  -Follow up with Dr. Chepe Doherty in his office on 11/8 1:15pm. Call [phone]  to schedule/confirm or with any questions/concerns.

## 2023-10-24 NOTE — H&P
History and Physical    Pt Name: Traci Sharma  MRN: [id number]  YOB: 1980  Date of evaluation: 10/24/2023  Primary Care Physician: Marilou Ambrocio    SUBJECTIVE:   History of Chief Complaint:    Traci Sharma is a 39 y.o. female who is scheduled today for IRRIGATION AND DEBRIDEMENT HIP  (LIQUIBAND ENSURE DRESSING, BACLISURE, FLAT ARLIN TABLE, LATERAL DECUBITUS) - Left. Patient states July of this year she fell and fractured her hip. She had surgical repair of this injury with hardware placement in July. She states the surgical site became infected in August. She had an I&D in August and states the site continues to drain serous drainage and is painful at the site of the incision. Patient states she does experience numbness and tingling to her left lower extremity at times. Allergies  is allergic to aripiprazole, aspirin, betadine [povidone iodine], celebrex [celecoxib], celexa [citalopram hydrobromide], citalopram, furosemide, nitrofurantoin, codeine, lithium, morphine, paroxetine, paxil [paroxetine hcl], povidone iodine, sertraline, tape [adhesive tape], and tegretol [carbamazepine]. Medications  Prior to Admission medications    Medication Sig Start Date End Date Taking? Authorizing Provider   baclofen (LIORESAL) 10 MG tablet Take 2 tablets by mouth in the morning and 2 tablets at noon and 2 tablets in the evening. Yes Arsen Owens MD   buprenorphine (BUPRENEX) 5 MCG/HR PTWK Place 1 patch onto the skin once a week. Apply @ bedtime every 7 days for lateral hip pain   Yes Arsen Owens MD   insulin glargine (LANTUS SOLOSTAR) 100 UNIT/ML injection Inject 10 Units into the skin nightly Pen-injector   Yes Arsen Owens MD   oxyCODONE-acetaminophen (PERCOCET) 5-325 MG per tablet Take 1 tablet by mouth every 4 hours as needed for Pain (For Moderate pain hip pain).  Max Daily Amount: 6 tablets  Patient not taking: Reported on 10/24/2023   Yes Arsen Owens MD

## 2023-10-24 NOTE — BRIEF OP NOTE
Brief Postoperative Note      Patient: Janeth Garrett  YOB: 1980  MRN: [id number]    Date of Procedure: 10/24/2023    Pre-Op Diagnosis Codes:     * Surgical site infection [T81.49XA]    Post-Op Diagnosis: Left Hip Post operative drainage       Procedure(s):  -Irrigation excisional debridement of skin down to and including the bone of 10 x 20 cm wound left hip  -Secondary wound closure      Surgeon(s):  Haylie Jolley DO    Assistant:  Resident: Darius Ruiz MD    Anesthesia: General    Estimated Blood Loss (mL): 199    Complications: None    Specimens:   ID Type Source Tests Collected by Time Destination   1 : LEFT HIP Swab Hip CULTURE, FUNGUS, CULTURE, WOUND (Canceled), CULTURE, ANAEROBIC AND AEROBIC Haylie Jolley DO 10/24/2023 1325        Implants:  * No implants in log *      Drains:   Negative Pressure Wound Therapy Leg Left;Upper;Dorsal (Active)       Findings: See op note      Electronically signed by Haylie Jolley DO on 10/24/2023 at 3:25 PM

## 2023-10-25 PROBLEM — G89.18 POST-OP PAIN: Status: ACTIVE | Noted: 2023-10-25

## 2023-10-25 PROBLEM — F31.9 BIPOLAR 1 DISORDER (HCC): Status: ACTIVE | Noted: 2023-10-25

## 2023-10-25 PROBLEM — T81.49XA WOUND INFECTION AFTER SURGERY: Status: ACTIVE | Noted: 2023-10-25

## 2023-10-25 LAB
ALBUMIN SERPL-MCNC: 2.3 G/DL (ref 3.5–5.2)
ALBUMIN/GLOB SERPL: 0.9 {RATIO} (ref 1–2.5)
ALP SERPL-CCNC: 126 U/L (ref 35–104)
ALT SERPL-CCNC: 25 U/L (ref 5–33)
ANION GAP SERPL CALCULATED.3IONS-SCNC: 9 MMOL/L (ref 9–17)
AST SERPL-CCNC: 19 U/L
BILIRUB DIRECT SERPL-MCNC: <0.1 MG/DL
BILIRUB INDIRECT SERPL-MCNC: ABNORMAL MG/DL (ref 0–1)
BILIRUB SERPL-MCNC: <0.1 MG/DL (ref 0.3–1.2)
BUN SERPL-MCNC: 15 MG/DL (ref 6–20)
CALCIUM SERPL-MCNC: 8.2 MG/DL (ref 8.6–10.4)
CHLORIDE SERPL-SCNC: 102 MMOL/L (ref 98–107)
CO2 SERPL-SCNC: 23 MMOL/L (ref 20–31)
CREAT SERPL-MCNC: 0.5 MG/DL (ref 0.5–0.9)
CRP SERPL HS-MCNC: 12.8 MG/L (ref 0–5)
ERYTHROCYTE [DISTWIDTH] IN BLOOD BY AUTOMATED COUNT: 13.9 % (ref 11.8–14.4)
GFR SERPL CREATININE-BSD FRML MDRD: >60 ML/MIN/1.73M2
GLUCOSE BLD-MCNC: 162 MG/DL (ref 65–105)
GLUCOSE BLD-MCNC: 175 MG/DL (ref 65–105)
GLUCOSE BLD-MCNC: 210 MG/DL (ref 65–105)
GLUCOSE BLD-MCNC: 340 MG/DL (ref 65–105)
GLUCOSE SERPL-MCNC: 204 MG/DL (ref 70–99)
HCT VFR BLD AUTO: 29.2 % (ref 36.3–47.1)
HGB BLD-MCNC: 8.7 G/DL (ref 11.9–15.1)
MCH RBC QN AUTO: 28.7 PG (ref 25.2–33.5)
MCHC RBC AUTO-ENTMCNC: 29.8 G/DL (ref 28.4–34.8)
MCV RBC AUTO: 96.4 FL (ref 82.6–102.9)
MICROORGANISM SPEC CULT: NORMAL
MICROORGANISM/AGENT SPEC: NORMAL
NRBC BLD-RTO: 0 PER 100 WBC
PLATELET # BLD AUTO: 274 K/UL (ref 138–453)
PMV BLD AUTO: 11.4 FL (ref 8.1–13.5)
POTASSIUM SERPL-SCNC: 4.9 MMOL/L (ref 3.7–5.3)
PROT SERPL-MCNC: 5 G/DL (ref 6.4–8.3)
RBC # BLD AUTO: 3.03 M/UL (ref 3.95–5.11)
SODIUM SERPL-SCNC: 134 MMOL/L (ref 135–144)
SPECIMEN DESCRIPTION: NORMAL
WBC OTHER # BLD: 7.2 K/UL (ref 3.5–11.3)

## 2023-10-25 PROCEDURE — 6360000002 HC RX W HCPCS: Performed by: INTERNAL MEDICINE

## 2023-10-25 PROCEDURE — 97162 PT EVAL MOD COMPLEX 30 MIN: CPT

## 2023-10-25 PROCEDURE — 1200000000 HC SEMI PRIVATE

## 2023-10-25 PROCEDURE — 85027 COMPLETE CBC AUTOMATED: CPT

## 2023-10-25 PROCEDURE — 6370000000 HC RX 637 (ALT 250 FOR IP): Performed by: INTERNAL MEDICINE

## 2023-10-25 PROCEDURE — 6370000000 HC RX 637 (ALT 250 FOR IP)

## 2023-10-25 PROCEDURE — 80076 HEPATIC FUNCTION PANEL: CPT

## 2023-10-25 PROCEDURE — 51798 US URINE CAPACITY MEASURE: CPT

## 2023-10-25 PROCEDURE — 97166 OT EVAL MOD COMPLEX 45 MIN: CPT

## 2023-10-25 PROCEDURE — 80048 BASIC METABOLIC PNL TOTAL CA: CPT

## 2023-10-25 PROCEDURE — 97530 THERAPEUTIC ACTIVITIES: CPT

## 2023-10-25 PROCEDURE — 6370000000 HC RX 637 (ALT 250 FOR IP): Performed by: STUDENT IN AN ORGANIZED HEALTH CARE EDUCATION/TRAINING PROGRAM

## 2023-10-25 PROCEDURE — 86140 C-REACTIVE PROTEIN: CPT

## 2023-10-25 PROCEDURE — 99222 1ST HOSP IP/OBS MODERATE 55: CPT | Performed by: INTERNAL MEDICINE

## 2023-10-25 PROCEDURE — 36415 COLL VENOUS BLD VENIPUNCTURE: CPT

## 2023-10-25 PROCEDURE — 6360000002 HC RX W HCPCS: Performed by: STUDENT IN AN ORGANIZED HEALTH CARE EDUCATION/TRAINING PROGRAM

## 2023-10-25 PROCEDURE — 51701 INSERT BLADDER CATHETER: CPT

## 2023-10-25 RX ORDER — GLUCAGON 1 MG/ML
1 KIT INJECTION PRN
Status: DISCONTINUED | OUTPATIENT
Start: 2023-10-25 | End: 2023-10-26 | Stop reason: HOSPADM

## 2023-10-25 RX ORDER — DEXTROSE MONOHYDRATE 100 MG/ML
INJECTION, SOLUTION INTRAVENOUS CONTINUOUS PRN
Status: DISCONTINUED | OUTPATIENT
Start: 2023-10-25 | End: 2023-10-26 | Stop reason: HOSPADM

## 2023-10-25 RX ORDER — INSULIN GLARGINE 100 [IU]/ML
10 INJECTION, SOLUTION SUBCUTANEOUS NIGHTLY
Status: DISCONTINUED | OUTPATIENT
Start: 2023-10-25 | End: 2023-10-26 | Stop reason: HOSPADM

## 2023-10-25 RX ORDER — INSULIN LISPRO 100 [IU]/ML
0-8 INJECTION, SOLUTION INTRAVENOUS; SUBCUTANEOUS
Status: DISCONTINUED | OUTPATIENT
Start: 2023-10-25 | End: 2023-10-26 | Stop reason: HOSPADM

## 2023-10-25 RX ORDER — MIDODRINE HYDROCHLORIDE 2.5 MG/1
2.5 TABLET ORAL 2 TIMES DAILY PRN
Status: DISCONTINUED | OUTPATIENT
Start: 2023-10-25 | End: 2023-10-26 | Stop reason: HOSPADM

## 2023-10-25 RX ORDER — BUPROPION HYDROCHLORIDE 150 MG/1
150 TABLET ORAL EVERY MORNING
Status: DISCONTINUED | OUTPATIENT
Start: 2023-10-25 | End: 2023-10-26 | Stop reason: HOSPADM

## 2023-10-25 RX ORDER — INSULIN LISPRO 100 [IU]/ML
0-4 INJECTION, SOLUTION INTRAVENOUS; SUBCUTANEOUS NIGHTLY
Status: DISCONTINUED | OUTPATIENT
Start: 2023-10-25 | End: 2023-10-26 | Stop reason: HOSPADM

## 2023-10-25 RX ORDER — INSULIN GLARGINE 100 [IU]/ML
24 INJECTION, SOLUTION SUBCUTANEOUS DAILY
Status: DISCONTINUED | OUTPATIENT
Start: 2023-10-25 | End: 2023-10-26 | Stop reason: HOSPADM

## 2023-10-25 RX ORDER — OXYCODONE HYDROCHLORIDE 5 MG/1
5 TABLET ORAL EVERY 6 HOURS PRN
Qty: 28 TABLET | Refills: 0 | Status: SHIPPED | OUTPATIENT
Start: 2023-10-25 | End: 2023-10-26 | Stop reason: HOSPADM

## 2023-10-25 RX ORDER — RIFAMPIN 300 MG/1
600 CAPSULE ORAL DAILY
Status: DISCONTINUED | OUTPATIENT
Start: 2023-10-25 | End: 2023-10-26 | Stop reason: HOSPADM

## 2023-10-25 RX ORDER — PRAVASTATIN SODIUM 20 MG
40 TABLET ORAL NIGHTLY
Status: DISCONTINUED | OUTPATIENT
Start: 2023-10-25 | End: 2023-10-26 | Stop reason: HOSPADM

## 2023-10-25 RX ORDER — PANTOPRAZOLE SODIUM 40 MG/1
40 TABLET, DELAYED RELEASE ORAL DAILY PRN
Status: DISCONTINUED | OUTPATIENT
Start: 2023-10-25 | End: 2023-10-26 | Stop reason: HOSPADM

## 2023-10-25 RX ADMIN — ACETAMINOPHEN 1000 MG: 500 TABLET ORAL at 06:00

## 2023-10-25 RX ADMIN — BUPROPION HYDROCHLORIDE 150 MG: 150 TABLET, EXTENDED RELEASE ORAL at 14:01

## 2023-10-25 RX ADMIN — CYCLOBENZAPRINE 10 MG: 10 TABLET, FILM COATED ORAL at 17:50

## 2023-10-25 RX ADMIN — INSULIN GLARGINE 24 UNITS: 100 INJECTION, SOLUTION SUBCUTANEOUS at 14:01

## 2023-10-25 RX ADMIN — KETOROLAC TROMETHAMINE 30 MG: 30 INJECTION, SOLUTION INTRAMUSCULAR; INTRAVENOUS at 06:00

## 2023-10-25 RX ADMIN — HYDROMORPHONE HYDROCHLORIDE 0.25 MG: 1 INJECTION, SOLUTION INTRAMUSCULAR; INTRAVENOUS; SUBCUTANEOUS at 12:13

## 2023-10-25 RX ADMIN — OXYCODONE HYDROCHLORIDE 10 MG: 5 TABLET ORAL at 22:07

## 2023-10-25 RX ADMIN — Medication 2000 MG: at 06:00

## 2023-10-25 RX ADMIN — MIDODRINE HYDROCHLORIDE 2.5 MG: 2.5 TABLET ORAL at 09:28

## 2023-10-25 RX ADMIN — ACETAMINOPHEN 1000 MG: 500 TABLET ORAL at 14:00

## 2023-10-25 RX ADMIN — KETOROLAC TROMETHAMINE 30 MG: 30 INJECTION, SOLUTION INTRAMUSCULAR; INTRAVENOUS at 20:17

## 2023-10-25 RX ADMIN — GABAPENTIN 100 MG: 100 CAPSULE ORAL at 14:01

## 2023-10-25 RX ADMIN — CYCLOBENZAPRINE 10 MG: 10 TABLET, FILM COATED ORAL at 11:45

## 2023-10-25 RX ADMIN — CYCLOBENZAPRINE 10 MG: 10 TABLET, FILM COATED ORAL at 20:17

## 2023-10-25 RX ADMIN — OXYCODONE HYDROCHLORIDE 10 MG: 5 TABLET ORAL at 01:49

## 2023-10-25 RX ADMIN — INSULIN LISPRO 4 UNITS: 100 INJECTION, SOLUTION INTRAVENOUS; SUBCUTANEOUS at 20:35

## 2023-10-25 RX ADMIN — OXYCODONE HYDROCHLORIDE 10 MG: 5 TABLET ORAL at 17:50

## 2023-10-25 RX ADMIN — INSULIN GLARGINE 10 UNITS: 100 INJECTION, SOLUTION SUBCUTANEOUS at 20:17

## 2023-10-25 RX ADMIN — MIDODRINE HYDROCHLORIDE 2.5 MG: 2.5 TABLET ORAL at 17:50

## 2023-10-25 RX ADMIN — OXYCODONE HYDROCHLORIDE 10 MG: 5 TABLET ORAL at 09:28

## 2023-10-25 RX ADMIN — KETOROLAC TROMETHAMINE 30 MG: 30 INJECTION, SOLUTION INTRAMUSCULAR; INTRAVENOUS at 11:45

## 2023-10-25 RX ADMIN — GABAPENTIN 100 MG: 100 CAPSULE ORAL at 20:17

## 2023-10-25 RX ADMIN — ACETAMINOPHEN 1000 MG: 500 TABLET ORAL at 20:18

## 2023-10-25 RX ADMIN — PRAVASTATIN SODIUM 40 MG: 20 TABLET ORAL at 20:17

## 2023-10-25 RX ADMIN — OXYCODONE HYDROCHLORIDE 10 MG: 5 TABLET ORAL at 14:00

## 2023-10-25 RX ADMIN — CYCLOBENZAPRINE 10 MG: 10 TABLET, FILM COATED ORAL at 06:00

## 2023-10-25 RX ADMIN — KETOROLAC TROMETHAMINE 30 MG: 30 INJECTION, SOLUTION INTRAMUSCULAR; INTRAVENOUS at 17:50

## 2023-10-25 RX ADMIN — Medication 2000 MG: at 17:51

## 2023-10-25 RX ADMIN — RIFAMPIN 600 MG: 300 CAPSULE ORAL at 17:50

## 2023-10-25 RX ADMIN — HYDROMORPHONE HYDROCHLORIDE 0.25 MG: 1 INJECTION, SOLUTION INTRAMUSCULAR; INTRAVENOUS; SUBCUTANEOUS at 16:23

## 2023-10-25 RX ADMIN — ENOXAPARIN SODIUM 40 MG: 100 INJECTION SUBCUTANEOUS at 09:28

## 2023-10-25 RX ADMIN — GABAPENTIN 100 MG: 100 CAPSULE ORAL at 09:28

## 2023-10-25 ASSESSMENT — PAIN DESCRIPTION - DESCRIPTORS
DESCRIPTORS: DISCOMFORT

## 2023-10-25 ASSESSMENT — PAIN SCALES - GENERAL
PAINLEVEL_OUTOF10: 8
PAINLEVEL_OUTOF10: 8
PAINLEVEL_OUTOF10: 9
PAINLEVEL_OUTOF10: 7
PAINLEVEL_OUTOF10: 10
PAINLEVEL_OUTOF10: 9
PAINLEVEL_OUTOF10: 8

## 2023-10-25 ASSESSMENT — PAIN DESCRIPTION - LOCATION
LOCATION: HIP

## 2023-10-25 ASSESSMENT — PAIN DESCRIPTION - ORIENTATION
ORIENTATION: LEFT

## 2023-10-25 NOTE — CARE COORDINATION
Case Management Assessment  Initial Evaluation    Date/Time of Evaluation: 10/25/2023 2:41 PM  Assessment Completed by: Joni Herman    If patient is discharged prior to next notation, then this note serves as note for discharge by case management. Patient Name: Gilma Power                   YOB: 1980  Diagnosis: Surgical site infection [T81.49XA]  Left hip postoperative wound infection [T81.49XA]                   Date / Time: 10/24/2023  8:33 AM    Patient Admission Status: Inpatient   Readmission Risk (Low < 19, Mod (19-27), High > 27): Readmission Risk Score: 32.8    Current PCP: Davie Kayser  PCP verified by CM? (P) Yes    Chart Reviewed: Yes      History Provided by: (P) Patient  Patient Orientation: (P) Alert and Oriented    Patient Cognition: (P) Alert    Hospitalization in the last 30 days (Readmission):  No    If yes, Readmission Assessment in CM Navigator will be completed.     Advance Directives:      Code Status: Full Code   Patient's Primary Decision Maker is: (P) Legal Next of Kin    Primary Decision Maker: Mamta King - Parent - [phone]    Secondary Decision Maker: Gaynell Blizzard - Brother/Sister - [phone]    Discharge Planning:    Patient lives with: (P) Alone Type of Home: (P) [address] [place] [address]  Primary Care Giver: (P) Self  Patient Support Systems include: (P) None   Current Financial resources: (P) Medicare, Medicaid  Current community resources:    Current services prior to admission: (P) Durable Medical Equipment            Current DME: (P) Walker, Wheelchair, Other (Comment) (has over toilet riser w/ rails; has grab bars)            Type of Home Care services:  (P) None    ADLS  Prior functional level: (P) Independent in ADLs/IADLs  Current functional level: (P) Assistance with the following:, Bathing, Dressing, Toileting, Cooking, Housework, Shopping, Mobility    PT AM-PAC: 8 /24  OT AM-PAC: 17 /24    Family can provide assistance at DC: (P)

## 2023-10-25 NOTE — CARE COORDINATION
Transitional planning: Patient wants to return to [address] [facility] and referral sent.

## 2023-10-26 VITALS
HEIGHT: 68 IN | DIASTOLIC BLOOD PRESSURE: 53 MMHG | WEIGHT: 251 LBS | TEMPERATURE: 98 F | RESPIRATION RATE: 16 BRPM | SYSTOLIC BLOOD PRESSURE: 93 MMHG | OXYGEN SATURATION: 97 % | HEART RATE: 83 BPM | BODY MASS INDEX: 38.04 KG/M2

## 2023-10-26 LAB
GLUCOSE BLD-MCNC: 79 MG/DL (ref 65–105)
GLUCOSE BLD-MCNC: 84 MG/DL (ref 65–105)
GLUCOSE BLD-MCNC: 85 MG/DL (ref 65–105)
MICROORGANISM SPEC CULT: ABNORMAL
MICROORGANISM SPEC CULT: ABNORMAL
MICROORGANISM/AGENT SPEC: ABNORMAL
MICROORGANISM/AGENT SPEC: ABNORMAL
SPECIMEN DESCRIPTION: ABNORMAL

## 2023-10-26 PROCEDURE — 99232 SBSQ HOSP IP/OBS MODERATE 35: CPT | Performed by: INTERNAL MEDICINE

## 2023-10-26 PROCEDURE — 6360000002 HC RX W HCPCS

## 2023-10-26 PROCEDURE — 97110 THERAPEUTIC EXERCISES: CPT

## 2023-10-26 PROCEDURE — 82947 ASSAY GLUCOSE BLOOD QUANT: CPT

## 2023-10-26 PROCEDURE — 2580000003 HC RX 258: Performed by: STUDENT IN AN ORGANIZED HEALTH CARE EDUCATION/TRAINING PROGRAM

## 2023-10-26 PROCEDURE — 97530 THERAPEUTIC ACTIVITIES: CPT

## 2023-10-26 PROCEDURE — 6370000000 HC RX 637 (ALT 250 FOR IP): Performed by: INTERNAL MEDICINE

## 2023-10-26 PROCEDURE — 6370000000 HC RX 637 (ALT 250 FOR IP): Performed by: STUDENT IN AN ORGANIZED HEALTH CARE EDUCATION/TRAINING PROGRAM

## 2023-10-26 PROCEDURE — 6370000000 HC RX 637 (ALT 250 FOR IP)

## 2023-10-26 PROCEDURE — 6360000002 HC RX W HCPCS: Performed by: STUDENT IN AN ORGANIZED HEALTH CARE EDUCATION/TRAINING PROGRAM

## 2023-10-26 PROCEDURE — 6360000002 HC RX W HCPCS: Performed by: INTERNAL MEDICINE

## 2023-10-26 RX ORDER — OXYCODONE HYDROCHLORIDE 10 MG/1
5 TABLET ORAL EVERY 4 HOURS PRN
Qty: 21 TABLET | Refills: 0 | Status: SHIPPED | OUTPATIENT
Start: 2023-10-26 | End: 2023-11-02

## 2023-10-26 RX ORDER — LORAZEPAM 0.5 MG/1
0.5 TABLET ORAL 3 TIMES DAILY PRN
Qty: 21 TABLET | Refills: 0 | Status: SHIPPED | OUTPATIENT
Start: 2023-10-26 | End: 2023-11-02

## 2023-10-26 RX ADMIN — KETOROLAC TROMETHAMINE 30 MG: 30 INJECTION, SOLUTION INTRAMUSCULAR; INTRAVENOUS at 10:42

## 2023-10-26 RX ADMIN — CYCLOBENZAPRINE 10 MG: 10 TABLET, FILM COATED ORAL at 16:15

## 2023-10-26 RX ADMIN — CYCLOBENZAPRINE 10 MG: 10 TABLET, FILM COATED ORAL at 10:42

## 2023-10-26 RX ADMIN — OXYCODONE HYDROCHLORIDE 10 MG: 5 TABLET ORAL at 06:28

## 2023-10-26 RX ADMIN — ACETAMINOPHEN 1000 MG: 500 TABLET ORAL at 13:25

## 2023-10-26 RX ADMIN — Medication 2000 MG: at 15:32

## 2023-10-26 RX ADMIN — MIDODRINE HYDROCHLORIDE 2.5 MG: 2.5 TABLET ORAL at 09:11

## 2023-10-26 RX ADMIN — GABAPENTIN 100 MG: 100 CAPSULE ORAL at 13:25

## 2023-10-26 RX ADMIN — OXYCODONE HYDROCHLORIDE 10 MG: 5 TABLET ORAL at 10:42

## 2023-10-26 RX ADMIN — ACETAMINOPHEN 1000 MG: 500 TABLET ORAL at 05:02

## 2023-10-26 RX ADMIN — HYDROMORPHONE HYDROCHLORIDE 0.25 MG: 1 INJECTION, SOLUTION INTRAMUSCULAR; INTRAVENOUS; SUBCUTANEOUS at 00:19

## 2023-10-26 RX ADMIN — CYCLOBENZAPRINE 10 MG: 10 TABLET, FILM COATED ORAL at 05:02

## 2023-10-26 RX ADMIN — ENOXAPARIN SODIUM 40 MG: 100 INJECTION SUBCUTANEOUS at 09:09

## 2023-10-26 RX ADMIN — GABAPENTIN 100 MG: 100 CAPSULE ORAL at 09:09

## 2023-10-26 RX ADMIN — OXYCODONE HYDROCHLORIDE 10 MG: 5 TABLET ORAL at 14:26

## 2023-10-26 RX ADMIN — RIFAMPIN 600 MG: 300 CAPSULE ORAL at 09:09

## 2023-10-26 RX ADMIN — OXYCODONE HYDROCHLORIDE 10 MG: 5 TABLET ORAL at 02:11

## 2023-10-26 RX ADMIN — SODIUM CHLORIDE, PRESERVATIVE FREE 10 ML: 5 INJECTION INTRAVENOUS at 09:09

## 2023-10-26 RX ADMIN — BUPROPION HYDROCHLORIDE 150 MG: 150 TABLET, EXTENDED RELEASE ORAL at 09:09

## 2023-10-26 RX ADMIN — KETOROLAC TROMETHAMINE 30 MG: 30 INJECTION, SOLUTION INTRAMUSCULAR; INTRAVENOUS at 05:04

## 2023-10-26 ASSESSMENT — PAIN SCALES - GENERAL
PAINLEVEL_OUTOF10: 8
PAINLEVEL_OUTOF10: 7
PAINLEVEL_OUTOF10: 8
PAINLEVEL_OUTOF10: 7
PAINLEVEL_OUTOF10: 8
PAINLEVEL_OUTOF10: 7
PAINLEVEL_OUTOF10: 7
PAINLEVEL_OUTOF10: 8

## 2023-10-26 ASSESSMENT — PAIN DESCRIPTION - DESCRIPTORS
DESCRIPTORS: DISCOMFORT

## 2023-10-26 ASSESSMENT — PAIN DESCRIPTION - ORIENTATION
ORIENTATION: LEFT

## 2023-10-26 ASSESSMENT — PAIN DESCRIPTION - LOCATION
LOCATION: HIP

## 2023-10-26 NOTE — PLAN OF CARE

## 2023-10-26 NOTE — CARE COORDINATION
Transition Plan  Return to [facility]. Faxed [facility] for transportation prior to 6 pm. Awaiting return phone call for transportation time. [address], spoke with Dion Church. Transportation 1545. PS to  Dr. Bonnie Leon will need complete luis. Need pain med and possibly lorazepam  scripts. Kermit Carter RN notified luis needed     Nurse Report phone number: [phone].    8729 Notified by PT, patient has a wheelchair that will need to be transported as well. Spoke with Gemma Chadwick, unable to take this time. Dion Church working on     7-bites to [address], Spoke with Dion Church. [facility] will  w/c at 5 pm.  Patient agreeable with plan. Kermit Carter RN notified when [facility] to be picked up. AVS printed and placed in Soft packet. Soft packet placed at [facility]'s desk. [address] with "[employer] Transportation ([phone]), spoke with Russell Bocanegra. On site,  will be up shortly.

## 2023-10-26 NOTE — DISCHARGE INSTR - COC
Adhesive bandage 10/25/23 2035   Drainage Amount None (dry) 10/26/23 0800   Odor None 10/24/23 1900   Number of days: 2       Incision 10/24/23 Thigh Left;Proximal (Active)   Number of days: 2        Elimination:  Continence: Bowel: Yes  Bladder: Yes  Urinary Catheter: None   Colostomy/Ileostomy/Ileal Conduit: No       Date of Last BM: ***    Intake/Output Summary (Last 24 hours) at 10/26/2023 1508  Last data filed at 10/26/2023 0910  Gross per 24 hour   Intake --   Output 2800 ml   Net -2800 ml     I/O last 3 completed shifts: In: 1069 [P.O.:300; I.V.:769]  Out: 2500 [Urine:2500]    Safety Concerns: At Risk for Falls    Impairments/Disabilities:      None    Nutrition Therapy:  Current Nutrition Therapy:   - Oral Diet:  Carb Control 5 carbs/meal (2000kcals/day)    Routes of Feeding: Oral  Liquids: No Restrictions  Daily Fluid Restriction: no  Last Modified Barium Swallow with Video (Video Swallowing Test): not done    Treatments at the Time of Hospital Discharge:   Respiratory Treatments: ***  Oxygen Therapy:  is not on home oxygen therapy.   Ventilator:    - No ventilator support    Rehab Therapies: Physical Therapy and Occupational Therapy  Weight Bearing Status/Restrictions: No weight bearing restrictions  Other Medical Equipment (for information only, NOT a DME order):  walker  Other Treatments: ***    Patient's personal belongings (please select all that are sent with patient):  None    RN SIGNATURE:  Electronically signed by Peri Becker RN on 10/26/23 at 3:10 PM EDT    CASE MANAGEMENT/SOCIAL WORK SECTION    Inpatient Status Date: 10/24/2023    Readmission Risk Assessment Score:  Readmission Risk              Risk of Unplanned Readmission:  69           Discharging to Facility/ Agency   Name: [facility]  Address:  Phone:  Fax:      / signature: Electronically signed by Valorie Jason on 10/26/23 at 3:59 PM EDT    PHYSICIAN SECTION    Prognosis: Fair    Condition at Discharge:

## 2023-10-26 NOTE — PLAN OF CARE
Problem: Discharge Planning  Goal: Discharge to home or other facility with appropriate resources  Outcome: Progressing     Problem: Pain  Goal: Verbalizes/displays adequate comfort level or baseline comfort level  Outcome: Progressing     Problem: Safety - Adult  Goal: Free from fall injury  Outcome: Progressing     Problem: Skin/Tissue Integrity  Goal: Absence of new skin breakdown  Description: 1. Monitor for areas of redness and/or skin breakdown  2. Assess vascular access sites hourly  3. Every 4-6 hours minimum:  Change oxygen saturation probe site  4. Every 4-6 hours:  If on nasal continuous positive airway pressure, respiratory therapy assess nares and determine need for appliance change or resting period.   Outcome: Progressing     Problem: ABCDS Injury Assessment  Goal: Absence of physical injury  Outcome: Progressing     Problem: Chronic Conditions and Co-morbidities  Goal: Patient's chronic conditions and co-morbidity symptoms are monitored and maintained or improved  Outcome: Progressing     Problem: Nutrition Deficit:  Goal: Optimize nutritional status  10/25/2023 2148 by Concepción Trinh RN  Outcome: Progressing  10/25/2023 1307 by Jose Guaman RD  Flowsheets (Taken 10/25/2023 1307)  Nutrient intake appropriate for improving, restoring, or maintaining nutritional needs: Assess nutritional status and recommend course of action

## 2023-10-30 ENCOUNTER — APPOINTMENT (OUTPATIENT)
Dept: GENERAL RADIOLOGY | Age: 43
End: 2023-10-30
Payer: MEDICARE

## 2023-10-30 ENCOUNTER — HOSPITAL ENCOUNTER (EMERGENCY)
Age: 43
Discharge: NURSING, REHAB OR LONG-TERM CARE | End: 2023-10-31
Attending: EMERGENCY MEDICINE
Payer: MEDICARE

## 2023-10-30 DIAGNOSIS — M25.552 LEFT HIP PAIN: Primary | ICD-10-CM

## 2023-10-30 LAB
ANION GAP SERPL CALCULATED.3IONS-SCNC: 8 MMOL/L (ref 9–17)
BASOPHILS # BLD: 0 K/UL (ref 0–0.2)
BASOPHILS NFR BLD: 0 % (ref 0–2)
BUN SERPL-MCNC: 11 MG/DL (ref 6–20)
CALCIUM SERPL-MCNC: 8.1 MG/DL (ref 8.6–10.4)
CHLORIDE SERPL-SCNC: 105 MMOL/L (ref 98–107)
CO2 SERPL-SCNC: 26 MMOL/L (ref 20–31)
CREAT SERPL-MCNC: 0.4 MG/DL (ref 0.5–0.9)
CRP SERPL HS-MCNC: 58.6 MG/L (ref 0–5)
EOSINOPHIL # BLD: 0.07 K/UL (ref 0–0.4)
EOSINOPHILS RELATIVE PERCENT: 1 % (ref 1–4)
ERYTHROCYTE [DISTWIDTH] IN BLOOD BY AUTOMATED COUNT: 13.7 % (ref 11.8–14.4)
ERYTHROCYTE [SEDIMENTATION RATE] IN BLOOD BY PHOTOMETRIC METHOD: 34 MM/HR (ref 0–20)
GFR SERPL CREATININE-BSD FRML MDRD: >60 ML/MIN/1.73M2
GLUCOSE SERPL-MCNC: 138 MG/DL (ref 70–99)
HCT VFR BLD AUTO: 26.8 % (ref 36.3–47.1)
HGB BLD-MCNC: 8.2 G/DL (ref 11.9–15.1)
IMM GRANULOCYTES # BLD AUTO: 0 K/UL (ref 0–0.3)
IMM GRANULOCYTES NFR BLD: 0 %
LYMPHOCYTES NFR BLD: 2.28 K/UL (ref 1–4.8)
LYMPHOCYTES RELATIVE PERCENT: 35 % (ref 24–44)
MCH RBC QN AUTO: 28.4 PG (ref 25.2–33.5)
MCHC RBC AUTO-ENTMCNC: 30.6 G/DL (ref 28.4–34.8)
MCV RBC AUTO: 92.7 FL (ref 82.6–102.9)
MICROORGANISM SPEC CULT: NORMAL
MICROORGANISM/AGENT SPEC: NORMAL
MONOCYTES NFR BLD: 0.33 K/UL (ref 0.1–0.8)
MONOCYTES NFR BLD: 5 % (ref 1–7)
MORPHOLOGY: NORMAL
NEUTROPHILS NFR BLD: 59 % (ref 36–66)
NEUTS SEG NFR BLD: 3.82 K/UL (ref 1.8–7.7)
NRBC BLD-RTO: 0 PER 100 WBC
PLATELET # BLD AUTO: 338 K/UL (ref 138–453)
PMV BLD AUTO: 11.5 FL (ref 8.1–13.5)
POTASSIUM SERPL-SCNC: 4.7 MMOL/L (ref 3.7–5.3)
RBC # BLD AUTO: 2.89 M/UL (ref 3.95–5.11)
SODIUM SERPL-SCNC: 139 MMOL/L (ref 135–144)
SPECIMEN DESCRIPTION: NORMAL
WBC OTHER # BLD: 6.5 K/UL (ref 3.5–11.3)

## 2023-10-30 PROCEDURE — 99284 EMERGENCY DEPT VISIT MOD MDM: CPT

## 2023-10-30 PROCEDURE — 73502 X-RAY EXAM HIP UNI 2-3 VIEWS: CPT

## 2023-10-30 PROCEDURE — 86140 C-REACTIVE PROTEIN: CPT

## 2023-10-30 PROCEDURE — 96376 TX/PRO/DX INJ SAME DRUG ADON: CPT

## 2023-10-30 PROCEDURE — 96374 THER/PROPH/DIAG INJ IV PUSH: CPT

## 2023-10-30 PROCEDURE — 80048 BASIC METABOLIC PNL TOTAL CA: CPT

## 2023-10-30 PROCEDURE — 85652 RBC SED RATE AUTOMATED: CPT

## 2023-10-30 PROCEDURE — 85025 COMPLETE CBC W/AUTO DIFF WBC: CPT

## 2023-10-30 PROCEDURE — 6360000002 HC RX W HCPCS

## 2023-10-30 RX ADMIN — HYDROMORPHONE HYDROCHLORIDE 1 MG: 1 INJECTION, SOLUTION INTRAMUSCULAR; INTRAVENOUS; SUBCUTANEOUS at 20:32

## 2023-10-30 RX ADMIN — HYDROMORPHONE HYDROCHLORIDE 0.5 MG: 1 INJECTION, SOLUTION INTRAMUSCULAR; INTRAVENOUS; SUBCUTANEOUS at 23:17

## 2023-10-30 RX ADMIN — HYDROMORPHONE HYDROCHLORIDE 1 MG: 1 INJECTION, SOLUTION INTRAMUSCULAR; INTRAVENOUS; SUBCUTANEOUS at 21:00

## 2023-10-30 ASSESSMENT — ENCOUNTER SYMPTOMS
ABDOMINAL PAIN: 0
DIARRHEA: 0
NAUSEA: 0
VOMITING: 0
SHORTNESS OF BREATH: 0

## 2023-10-30 ASSESSMENT — PAIN DESCRIPTION - LOCATION: LOCATION: HIP

## 2023-10-30 ASSESSMENT — PAIN SCALES - GENERAL
PAINLEVEL_OUTOF10: 9
PAINLEVEL_OUTOF10: 7
PAINLEVEL_OUTOF10: 9
PAINLEVEL_OUTOF10: 4
PAINLEVEL_OUTOF10: 8

## 2023-10-30 ASSESSMENT — PAIN - FUNCTIONAL ASSESSMENT: PAIN_FUNCTIONAL_ASSESSMENT: 0-10

## 2023-10-30 ASSESSMENT — PAIN DESCRIPTION - ORIENTATION: ORIENTATION: LEFT

## 2023-10-31 VITALS
WEIGHT: 245 LBS | TEMPERATURE: 98.7 F | RESPIRATION RATE: 17 BRPM | HEART RATE: 81 BPM | DIASTOLIC BLOOD PRESSURE: 54 MMHG | OXYGEN SATURATION: 97 % | BODY MASS INDEX: 37.25 KG/M2 | SYSTOLIC BLOOD PRESSURE: 100 MMHG

## 2023-10-31 NOTE — DISCHARGE INSTRUCTIONS
Orthopaedic Instructions:  -Weight bearing status: Weight bearing as tolerated with the left leg  -Maintain prevena at all times. Okay to reinforce prevena dressings. A second cannister was provided and can be switched out if the first cannister fills.  was provided. Keep prevena plugged into  whenever possible.   -Always look for signs of compartment syndrome: pain out of proportion to the injury, pain not controlled with pain medication, numbness in digits, changing of color of digits (paleness). If these signs occur return to ED immediately for reassessment.  -Ice left hip for throbbing pain and to help decrease swelling.  -Call the office or come to Emergency Room if signs of infection appear (hot, swollen, red, draining pus, fever)  -Take medications as prescribed. -Continue outpatient antibiotics, per infectious disease recommendations.   -Wean off narcotics (percocet/norco) as soon as possible. Do not take tylenol if still taking narcotics.  -Follow up with  [facility] in his office on 11/8/23 at 1:15pm. Call [phone] to confirm or with any questions/concerns.
none

## 2023-10-31 NOTE — ED NOTES
SW consulted to arrange transport back to [facility]. [facility] [email or web address] [facility]. Spoke to Nunu Villanueva at [address] [facility]. [facility] [id number]. Sw called [facility] to confirm pt is a resident and spoke to [address] [place] [address].        Roslynma T[place]ka, [address] [place] [address]  10/30/23 7242

## 2023-10-31 NOTE — PROGRESS NOTES
2 g twice daily per infectious disease recommendations  - Weightbearing as tolerated left lower extremity  - Discussion had with patient regarding maintaining Prevena charged and intact to left hip.  - Follow-up with Dr. Yuly Mc in his office on 11/8/2023. If she has significant increase in her pain, excessive drain output she will call our office to be seen earlier.       Saint Rubin, MD  Orthopedic Surgery Resident, PGY-2  [address] [place], [place]

## 2023-10-31 NOTE — ED PROVIDER NOTES
[facility] ED  Emergency Department Encounter  Emergency Medicine Resident     Pt Kings Padilla  MRN: [id number]  [address] [facility] [place] 1980  Date of evaluation: 10/30/23  PCP:  Amalia Vang  Note Started: 8:20 PM EDT      CHIEF COMPLAINT       Chief Complaint   Patient presents with    Hip Pain    Wound Infection       HISTORY OF PRESENT ILLNESS  (Location/Symptom, Timing/Onset, Context/Setting, Quality, Duration, Modifying Factors, Severity.)      Allen Quinn is a 39 y.o. female who presents with hip pain. Patient has history of a left femur fracture, had hip replacement approximate 1 month ago and has had several revisions due to infection. Patient states the last of which was last Tuesday. She states that she has been doing well since surgery however today she has had significantly worsening hip pain and she noticed that her bandages were completely saturated with blood which before she had no drainage or bleeding. Patient states she spoke to Dr. Kenny Ga who told her to come to the emergency room to be evaluated. Patient denies any fever, chills, chest pain, shortness breath, dumping, nausea, vomiting or any other complaints.     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

## 2023-10-31 NOTE — CONSULTS
Sural/saphenous/SPN/DPN/plantar nerve distribution SILT. Pt is actively flexing the knee. DP and PT pulses 2+ with BCR, toes are warm and well perfused. Labs:  Recent Labs     10/30/23  2042   WBC 6.5   HGB 8.2*   HCT 26.8*         K 4.7   BUN 11   CREATININE 0.4*   GLUCOSE 138*   SEDRATE 34*        Imaging:   Multiple views of the left hip demonstrating intact hemiarthroplasty of left hip without any evidence of hardware loosening/subsidience. No other acute osseous abnormalities seen. Assessment: 39 y.o. female who being seen for:    -Left hip surgical site pain and serosanguinous drainage. Plan:    - No acute orthopaedic surgical intervention planned at this time  -Prevena Plus was placed on left hip overlying the surgical site. Had an in depth discussion with the patient regarding the proper usage of the prevena including keeping it well charged and in place at all times apart from while undergoing physical therapy. Pt was informed that if her dressings get saturated, these can be reinforced using the additional vacuum assistive dressings provided to the patient. Pt was also given a second 150mL cannister if the current becomes full.   -Reinforce dressings as needed per nursing.  - WBAT  to the left lower extremity. - CRP: 58.6, ESR: 34   - Diet: Ok for Adult diet from ortho perspective   -Abx: Continue outpatient antibiotics, per infectious disease recs. - Pain control and medical management per emergency department   - Ok to discharge from orthopedic perspective   - Follow up with Dr. Renay Hodges 11/8 . Pt was informed that if needed, she can call our clinic and make an earlier appointment.   -Please contact Ortho on call with any questions. Reginaldo Flores DO  Orthopedic Surgery Resident, PGY-1  [address], [place]      PGY-2 Addendum    Patient seen and examined. Agree with subjective and objective portions from Dr. Marlene Angel.      The patient is a 39

## 2023-10-31 NOTE — ED NOTES
Pt complaint again of left hip pain, 7/10.  Dr. Avendano Screws informed, no need to give more pain meds as per Doctor advise     Michelle Duncan RN  10/31/23 8956

## 2023-10-31 NOTE — ED NOTES
Arrived patient to ED escorted by EMS, with complaint of left hip pain and open wound on the post surgical site,painscale of 9/10. pt states that she had left hip surgery done 6 days ago, and had staph infection and has been taking rocephin antibiotic. Pt is alert and oriented, able to follow commands and appropriate, hooked to monitor, vital signs stable. Bloods collected and send.             Kendal Smith RN  10/30/23 8285

## 2023-11-02 ENCOUNTER — TELEPHONE (OUTPATIENT)
Dept: ORTHOPEDIC SURGERY | Age: 43
End: 2023-11-02

## 2023-11-02 ENCOUNTER — TELEPHONE (OUTPATIENT)
Dept: INFECTIOUS DISEASES | Age: 43
End: 2023-11-02

## 2023-11-02 RX ORDER — RIFAMPIN 300 MG/1
600 CAPSULE ORAL DAILY
Qty: 46 CAPSULE | Refills: 0 | Status: SHIPPED | OUTPATIENT
Start: 2023-11-02 | End: 2023-11-25

## 2023-11-02 NOTE — TELEPHONE ENCOUNTER
Pt D/C from [address] [facility] [address] without having her oral AB GOING TO HER PHARMACY. Per prog note recommendation:   Rifampin 600 mg po daily for synergy against S aureus    Please check Rx Sig before signing.

## 2023-11-02 NOTE — TELEPHONE ENCOUNTER
Patient called in stating her wound vac canister is full and she needs supplies.   Patient is currently inpatient at [facility]. Writer called and spoke with patient nurse, iGll. Yesica states wound vac is fine. Writer told Gill the patient just called this office herself looking for canisters. She stated they do not have supplies nor does she know where toget them. Writer contacted Rob from  to assist patient. Rob respond he will take care of the patient. Gill will speak with DON to see how the facility will work on keeping supplies on hand so patient is better prepared if canister fills again.     Vac applied on 10/30/2023    Anything else you would like for this patient?

## 2023-11-03 ENCOUNTER — TELEPHONE (OUTPATIENT)
Dept: ORTHOPEDIC SURGERY | Age: 43
End: 2023-11-03

## 2023-11-03 NOTE — TELEPHONE ENCOUNTER
----- Message from Nixon Moreira DO sent at 11/2/2023  6:11 PM EDT -----  Regarding: drainage  Please have her see me next week without xrays    Beverly Mckee

## 2023-11-06 LAB
MICROORGANISM SPEC CULT: NORMAL
MICROORGANISM/AGENT SPEC: NORMAL
SPECIMEN DESCRIPTION: NORMAL

## 2023-11-06 RX ORDER — OXYCODONE HYDROCHLORIDE 10 MG/1
5 TABLET ORAL EVERY 4 HOURS PRN
Status: ON HOLD | COMMUNITY
End: 2023-11-08 | Stop reason: HOSPADM

## 2023-11-06 RX ORDER — LORAZEPAM 0.5 MG/1
0.5 TABLET ORAL EVERY 8 HOURS PRN
Status: ON HOLD | COMMUNITY

## 2023-11-07 ENCOUNTER — ANESTHESIA EVENT (OUTPATIENT)
Dept: OPERATING ROOM | Age: 43
End: 2023-11-07
Payer: MEDICARE

## 2023-11-07 ENCOUNTER — APPOINTMENT (OUTPATIENT)
Dept: GENERAL RADIOLOGY | Age: 43
DRG: 464 | End: 2023-11-07
Attending: STUDENT IN AN ORGANIZED HEALTH CARE EDUCATION/TRAINING PROGRAM
Payer: MEDICARE

## 2023-11-07 ENCOUNTER — HOSPITAL ENCOUNTER (INPATIENT)
Age: 43
LOS: 8 days | Discharge: NURSING, REHAB OR LONG-TERM CARE | DRG: 464 | End: 2023-11-15
Attending: STUDENT IN AN ORGANIZED HEALTH CARE EDUCATION/TRAINING PROGRAM | Admitting: STUDENT IN AN ORGANIZED HEALTH CARE EDUCATION/TRAINING PROGRAM
Payer: MEDICARE

## 2023-11-07 ENCOUNTER — ANESTHESIA (OUTPATIENT)
Dept: OPERATING ROOM | Age: 43
End: 2023-11-07
Payer: MEDICARE

## 2023-11-07 DIAGNOSIS — T84.50XA INFECTION OF PROSTHETIC JOINT, INITIAL ENCOUNTER (HCC): ICD-10-CM

## 2023-11-07 DIAGNOSIS — Z96.642 S/P TOTAL LEFT HIP ARTHROPLASTY: Primary | ICD-10-CM

## 2023-11-07 LAB
ANION GAP SERPL CALCULATED.3IONS-SCNC: 15 MMOL/L (ref 9–17)
BUN SERPL-MCNC: 9 MG/DL (ref 6–20)
CALCIUM SERPL-MCNC: 8.6 MG/DL (ref 8.6–10.4)
CHLORIDE SERPL-SCNC: 103 MMOL/L (ref 98–107)
CO2 SERPL-SCNC: 18 MMOL/L (ref 20–31)
CREAT SERPL-MCNC: 0.5 MG/DL (ref 0.5–0.9)
ERYTHROCYTE [DISTWIDTH] IN BLOOD BY AUTOMATED COUNT: 14.2 % (ref 11.8–14.4)
GFR SERPL CREATININE-BSD FRML MDRD: >60 ML/MIN/1.73M2
GLUCOSE BLD-MCNC: 143 MG/DL (ref 65–105)
GLUCOSE BLD-MCNC: 269 MG/DL (ref 65–105)
GLUCOSE BLD-MCNC: 282 MG/DL (ref 65–105)
GLUCOSE SERPL-MCNC: 292 MG/DL (ref 70–99)
HCT VFR BLD AUTO: 38.2 % (ref 36.3–47.1)
HGB BLD-MCNC: 10.8 G/DL (ref 11.9–15.1)
MCH RBC QN AUTO: 28.6 PG (ref 25.2–33.5)
MCHC RBC AUTO-ENTMCNC: 28.3 G/DL (ref 28.4–34.8)
MCV RBC AUTO: 101.1 FL (ref 82.6–102.9)
NRBC BLD-RTO: 0 PER 100 WBC
PLATELET # BLD AUTO: 257 K/UL (ref 138–453)
PMV BLD AUTO: 11 FL (ref 8.1–13.5)
POTASSIUM SERPL-SCNC: 4.7 MMOL/L (ref 3.7–5.3)
RBC # BLD AUTO: 3.78 M/UL (ref 3.95–5.11)
SODIUM SERPL-SCNC: 136 MMOL/L (ref 135–144)
WBC OTHER # BLD: 15.5 K/UL (ref 3.5–11.3)

## 2023-11-07 PROCEDURE — 6360000002 HC RX W HCPCS: Performed by: STUDENT IN AN ORGANIZED HEALTH CARE EDUCATION/TRAINING PROGRAM

## 2023-11-07 PROCEDURE — 86920 COMPATIBILITY TEST SPIN: CPT

## 2023-11-07 PROCEDURE — 86900 BLOOD TYPING SEROLOGIC ABO: CPT

## 2023-11-07 PROCEDURE — 3600000014 HC SURGERY LEVEL 4 ADDTL 15MIN: Performed by: STUDENT IN AN ORGANIZED HEALTH CARE EDUCATION/TRAINING PROGRAM

## 2023-11-07 PROCEDURE — 6370000000 HC RX 637 (ALT 250 FOR IP): Performed by: STUDENT IN AN ORGANIZED HEALTH CARE EDUCATION/TRAINING PROGRAM

## 2023-11-07 PROCEDURE — 3700000001 HC ADD 15 MINUTES (ANESTHESIA): Performed by: STUDENT IN AN ORGANIZED HEALTH CARE EDUCATION/TRAINING PROGRAM

## 2023-11-07 PROCEDURE — 73552 X-RAY EXAM OF FEMUR 2/>: CPT

## 2023-11-07 PROCEDURE — C1776 JOINT DEVICE (IMPLANTABLE): HCPCS | Performed by: STUDENT IN AN ORGANIZED HEALTH CARE EDUCATION/TRAINING PROGRAM

## 2023-11-07 PROCEDURE — 86850 RBC ANTIBODY SCREEN: CPT

## 2023-11-07 PROCEDURE — 6360000002 HC RX W HCPCS: Performed by: ANESTHESIOLOGY

## 2023-11-07 PROCEDURE — 6360000002 HC RX W HCPCS: Performed by: REGISTERED NURSE

## 2023-11-07 PROCEDURE — 72170 X-RAY EXAM OF PELVIS: CPT

## 2023-11-07 PROCEDURE — 6370000000 HC RX 637 (ALT 250 FOR IP)

## 2023-11-07 PROCEDURE — 3700000000 HC ANESTHESIA ATTENDED CARE: Performed by: STUDENT IN AN ORGANIZED HEALTH CARE EDUCATION/TRAINING PROGRAM

## 2023-11-07 PROCEDURE — 85027 COMPLETE CBC AUTOMATED: CPT

## 2023-11-07 PROCEDURE — 86901 BLOOD TYPING SEROLOGIC RH(D): CPT

## 2023-11-07 PROCEDURE — 2580000003 HC RX 258: Performed by: ANESTHESIOLOGY

## 2023-11-07 PROCEDURE — 7100000001 HC PACU RECOVERY - ADDTL 15 MIN: Performed by: STUDENT IN AN ORGANIZED HEALTH CARE EDUCATION/TRAINING PROGRAM

## 2023-11-07 PROCEDURE — C1713 ANCHOR/SCREW BN/BN,TIS/BN: HCPCS | Performed by: STUDENT IN AN ORGANIZED HEALTH CARE EDUCATION/TRAINING PROGRAM

## 2023-11-07 PROCEDURE — 2709999900 HC NON-CHARGEABLE SUPPLY: Performed by: STUDENT IN AN ORGANIZED HEALTH CARE EDUCATION/TRAINING PROGRAM

## 2023-11-07 PROCEDURE — 3600000004 HC SURGERY LEVEL 4 BASE: Performed by: STUDENT IN AN ORGANIZED HEALTH CARE EDUCATION/TRAINING PROGRAM

## 2023-11-07 PROCEDURE — 0SHB08Z INSERTION OF SPACER INTO LEFT HIP JOINT, OPEN APPROACH: ICD-10-PCS | Performed by: STUDENT IN AN ORGANIZED HEALTH CARE EDUCATION/TRAINING PROGRAM

## 2023-11-07 PROCEDURE — 82947 ASSAY GLUCOSE BLOOD QUANT: CPT

## 2023-11-07 PROCEDURE — 1200000000 HC SEMI PRIVATE

## 2023-11-07 PROCEDURE — 11044 DBRDMT BONE 1ST 20 SQ CM/<: CPT | Performed by: STUDENT IN AN ORGANIZED HEALTH CARE EDUCATION/TRAINING PROGRAM

## 2023-11-07 PROCEDURE — 0SRB0EZ REPLACEMENT OF LEFT HIP JOINT WITH ARTICULATING SPACER, OPEN APPROACH: ICD-10-PCS | Performed by: STUDENT IN AN ORGANIZED HEALTH CARE EDUCATION/TRAINING PROGRAM

## 2023-11-07 PROCEDURE — 27091 REMOVAL OF HIP PROSTHESIS: CPT | Performed by: STUDENT IN AN ORGANIZED HEALTH CARE EDUCATION/TRAINING PROGRAM

## 2023-11-07 PROCEDURE — 0SPB0JZ REMOVAL OF SYNTHETIC SUBSTITUTE FROM LEFT HIP JOINT, OPEN APPROACH: ICD-10-PCS | Performed by: STUDENT IN AN ORGANIZED HEALTH CARE EDUCATION/TRAINING PROGRAM

## 2023-11-07 PROCEDURE — 36415 COLL VENOUS BLD VENIPUNCTURE: CPT

## 2023-11-07 PROCEDURE — 2580000003 HC RX 258: Performed by: STUDENT IN AN ORGANIZED HEALTH CARE EDUCATION/TRAINING PROGRAM

## 2023-11-07 PROCEDURE — 7100000000 HC PACU RECOVERY - FIRST 15 MIN: Performed by: STUDENT IN AN ORGANIZED HEALTH CARE EDUCATION/TRAINING PROGRAM

## 2023-11-07 PROCEDURE — 80048 BASIC METABOLIC PNL TOTAL CA: CPT

## 2023-11-07 PROCEDURE — 0SBB0ZZ EXCISION OF LEFT HIP JOINT, OPEN APPROACH: ICD-10-PCS | Performed by: STUDENT IN AN ORGANIZED HEALTH CARE EDUCATION/TRAINING PROGRAM

## 2023-11-07 PROCEDURE — 2500000003 HC RX 250 WO HCPCS: Performed by: REGISTERED NURSE

## 2023-11-07 PROCEDURE — 6360000002 HC RX W HCPCS: Performed by: NURSE ANESTHETIST, CERTIFIED REGISTERED

## 2023-11-07 PROCEDURE — 11047 DBRDMT BONE EACH ADDL: CPT | Performed by: STUDENT IN AN ORGANIZED HEALTH CARE EDUCATION/TRAINING PROGRAM

## 2023-11-07 DEVICE — HEAD BPLR OD49MM ID28MM FEM HIP SELF CNTR: Type: IMPLANTABLE DEVICE | Site: HIP | Status: FUNCTIONAL

## 2023-11-07 DEVICE — PALACOS® R+G IS A FAST SETTING POLYMER CONTAINING GENTAMICIN, FOR USE IN BONE SURGERY. MIXING OF THE TWO COMPONENT SYSTEM, CONSISTING OF A POWDER AND A LIQUID, INITIALLY PRODUCES A LIQUID AND THEN A PASTE, WHICH IS USED TO ANCHOR THE PROSTHESIS TO THE BONE. THE HARDENED BONE CEMENT ALLOWS STABLE FIXATION OF THE PROSTHESIS AND TRANSFERS ALL STRESSES PRODUCED IN A MOVEMENT TO THE BONE VIA THE LARGE INTERFACE. INSOLUBLE ZIRCONIUM DIOXIDE IS INCLUDED IN THE CEMENT POWDER AS AN X RAY CONTRAST MEDIUM. THE CHLOROPHYLL ADDITIVE SERVES AS OPTICAL MARKING OF THE BONE CEMENT AT THE SITE OF THE OPERATION.
Type: IMPLANTABLE DEVICE | Site: HIP | Status: FUNCTIONAL
Brand: PALACOS®

## 2023-11-07 DEVICE — ARTICUL/EZE FEMORAL HEAD DIAMETER 28MM +8.5 12/14 TAPER
Type: IMPLANTABLE DEVICE | Site: HIP | Status: FUNCTIONAL
Brand: ARTICUL/EZE

## 2023-11-07 DEVICE — IMPLANTABLE DEVICE: Type: IMPLANTABLE DEVICE | Site: HIP | Status: FUNCTIONAL

## 2023-11-07 RX ORDER — MIDAZOLAM HYDROCHLORIDE 2 MG/2ML
2 INJECTION, SOLUTION INTRAMUSCULAR; INTRAVENOUS ONCE
Status: COMPLETED | OUTPATIENT
Start: 2023-11-07 | End: 2023-11-07

## 2023-11-07 RX ORDER — LANOLIN ALCOHOL/MO/W.PET/CERES
6 CREAM (GRAM) TOPICAL NIGHTLY PRN
Status: DISCONTINUED | OUTPATIENT
Start: 2023-11-08 | End: 2023-11-15 | Stop reason: HOSPADM

## 2023-11-07 RX ORDER — PROPOFOL 10 MG/ML
INJECTION, EMULSION INTRAVENOUS PRN
Status: DISCONTINUED | OUTPATIENT
Start: 2023-11-07 | End: 2023-11-07 | Stop reason: SDUPTHER

## 2023-11-07 RX ORDER — POLYVINYL ALCOHOL 14 MG/ML
1 SOLUTION/ DROPS OPHTHALMIC PRN
Status: DISCONTINUED | OUTPATIENT
Start: 2023-11-07 | End: 2023-11-15 | Stop reason: HOSPADM

## 2023-11-07 RX ORDER — MAGNESIUM CARB/ALUMINUM HYDROX 105-160MG
TABLET,CHEWABLE ORAL PRN
Status: DISCONTINUED | OUTPATIENT
Start: 2023-11-07 | End: 2023-11-07 | Stop reason: ALTCHOICE

## 2023-11-07 RX ORDER — FONDAPARINUX SODIUM 5 MG/.4ML
10 INJECTION SUBCUTANEOUS DAILY
Status: DISCONTINUED | OUTPATIENT
Start: 2023-11-08 | End: 2023-11-15 | Stop reason: HOSPADM

## 2023-11-07 RX ORDER — CEFAZOLIN SODIUM 1 G/3ML
INJECTION, POWDER, FOR SOLUTION INTRAMUSCULAR; INTRAVENOUS PRN
Status: DISCONTINUED | OUTPATIENT
Start: 2023-11-07 | End: 2023-11-07 | Stop reason: SDUPTHER

## 2023-11-07 RX ORDER — HYDRALAZINE HYDROCHLORIDE 20 MG/ML
10 INJECTION INTRAMUSCULAR; INTRAVENOUS
Status: DISCONTINUED | OUTPATIENT
Start: 2023-11-07 | End: 2023-11-07

## 2023-11-07 RX ORDER — TOBRAMYCIN 1.2 G/30ML
INJECTION, POWDER, LYOPHILIZED, FOR SOLUTION INTRAVENOUS PRN
Status: DISCONTINUED | OUTPATIENT
Start: 2023-11-07 | End: 2023-11-07 | Stop reason: ALTCHOICE

## 2023-11-07 RX ORDER — MAGNESIUM HYDROXIDE 1200 MG/15ML
LIQUID ORAL CONTINUOUS PRN
Status: COMPLETED | OUTPATIENT
Start: 2023-11-07 | End: 2023-11-07

## 2023-11-07 RX ORDER — SODIUM CHLORIDE 0.9 % (FLUSH) 0.9 %
5-40 SYRINGE (ML) INJECTION PRN
Status: DISCONTINUED | OUTPATIENT
Start: 2023-11-07 | End: 2023-11-15 | Stop reason: HOSPADM

## 2023-11-07 RX ORDER — CYCLOBENZAPRINE HCL 10 MG
10 TABLET ORAL EVERY 6 HOURS
Status: DISCONTINUED | OUTPATIENT
Start: 2023-11-07 | End: 2023-11-07

## 2023-11-07 RX ORDER — MAGNESIUM SULFATE 1 G/100ML
INJECTION INTRAVENOUS PRN
Status: DISCONTINUED | OUTPATIENT
Start: 2023-11-07 | End: 2023-11-07 | Stop reason: SDUPTHER

## 2023-11-07 RX ORDER — ROCURONIUM BROMIDE 10 MG/ML
INJECTION, SOLUTION INTRAVENOUS PRN
Status: DISCONTINUED | OUTPATIENT
Start: 2023-11-07 | End: 2023-11-07 | Stop reason: SDUPTHER

## 2023-11-07 RX ORDER — SODIUM CHLORIDE 0.9 % (FLUSH) 0.9 %
5-40 SYRINGE (ML) INJECTION EVERY 12 HOURS SCHEDULED
Status: DISCONTINUED | OUTPATIENT
Start: 2023-11-07 | End: 2023-11-15 | Stop reason: HOSPADM

## 2023-11-07 RX ORDER — SODIUM CHLORIDE, SODIUM LACTATE, POTASSIUM CHLORIDE, CALCIUM CHLORIDE 600; 310; 30; 20 MG/100ML; MG/100ML; MG/100ML; MG/100ML
INJECTION, SOLUTION INTRAVENOUS CONTINUOUS
Status: DISCONTINUED | OUTPATIENT
Start: 2023-11-07 | End: 2023-11-07

## 2023-11-07 RX ORDER — SODIUM CHLORIDE 0.9 % (FLUSH) 0.9 %
5-40 SYRINGE (ML) INJECTION EVERY 12 HOURS SCHEDULED
Status: DISCONTINUED | OUTPATIENT
Start: 2023-11-07 | End: 2023-11-07

## 2023-11-07 RX ORDER — VALACYCLOVIR HYDROCHLORIDE 500 MG/1
500 TABLET, FILM COATED ORAL DAILY
Status: DISCONTINUED | OUTPATIENT
Start: 2023-11-07 | End: 2023-11-15 | Stop reason: HOSPADM

## 2023-11-07 RX ORDER — DOCUSATE SODIUM 100 MG/1
100 CAPSULE, LIQUID FILLED ORAL 2 TIMES DAILY
Status: DISCONTINUED | OUTPATIENT
Start: 2023-11-07 | End: 2023-11-15 | Stop reason: HOSPADM

## 2023-11-07 RX ORDER — FLUOXETINE HYDROCHLORIDE 20 MG/1
40 CAPSULE ORAL DAILY
Status: DISCONTINUED | OUTPATIENT
Start: 2023-11-07 | End: 2023-11-15 | Stop reason: HOSPADM

## 2023-11-07 RX ORDER — ACETAMINOPHEN 500 MG
1000 TABLET ORAL EVERY 6 HOURS SCHEDULED
Status: DISCONTINUED | OUTPATIENT
Start: 2023-11-08 | End: 2023-11-15 | Stop reason: HOSPADM

## 2023-11-07 RX ORDER — GABAPENTIN 100 MG/1
100 CAPSULE ORAL 3 TIMES DAILY
Status: DISCONTINUED | OUTPATIENT
Start: 2023-11-07 | End: 2023-11-15 | Stop reason: HOSPADM

## 2023-11-07 RX ORDER — LOSARTAN POTASSIUM 50 MG/1
50 TABLET ORAL DAILY
Status: DISCONTINUED | OUTPATIENT
Start: 2023-11-07 | End: 2023-11-15 | Stop reason: HOSPADM

## 2023-11-07 RX ORDER — DIPHENHYDRAMINE HYDROCHLORIDE 50 MG/ML
12.5 INJECTION INTRAMUSCULAR; INTRAVENOUS
Status: DISCONTINUED | OUTPATIENT
Start: 2023-11-07 | End: 2023-11-07

## 2023-11-07 RX ORDER — MIDAZOLAM HYDROCHLORIDE 1 MG/ML
INJECTION INTRAMUSCULAR; INTRAVENOUS PRN
Status: DISCONTINUED | OUTPATIENT
Start: 2023-11-07 | End: 2023-11-07 | Stop reason: SDUPTHER

## 2023-11-07 RX ORDER — INSULIN GLARGINE 100 [IU]/ML
24 INJECTION, SOLUTION SUBCUTANEOUS DAILY
Status: DISCONTINUED | OUTPATIENT
Start: 2023-11-08 | End: 2023-11-08

## 2023-11-07 RX ORDER — OXYCODONE HYDROCHLORIDE 5 MG/1
10 TABLET ORAL EVERY 4 HOURS PRN
Status: DISCONTINUED | OUTPATIENT
Start: 2023-11-07 | End: 2023-11-12

## 2023-11-07 RX ORDER — INSULIN LISPRO 100 [IU]/ML
0-4 INJECTION, SOLUTION INTRAVENOUS; SUBCUTANEOUS NIGHTLY
Status: DISCONTINUED | OUTPATIENT
Start: 2023-11-07 | End: 2023-11-08

## 2023-11-07 RX ORDER — SODIUM CHLORIDE 9 MG/ML
INJECTION, SOLUTION INTRAVENOUS PRN
Status: DISCONTINUED | OUTPATIENT
Start: 2023-11-07 | End: 2023-11-15 | Stop reason: HOSPADM

## 2023-11-07 RX ORDER — FENTANYL CITRATE 50 UG/ML
INJECTION, SOLUTION INTRAMUSCULAR; INTRAVENOUS PRN
Status: DISCONTINUED | OUTPATIENT
Start: 2023-11-07 | End: 2023-11-07 | Stop reason: SDUPTHER

## 2023-11-07 RX ORDER — CALCIUM CARBONATE 500 MG/1
500 TABLET, CHEWABLE ORAL 3 TIMES DAILY PRN
Status: DISCONTINUED | OUTPATIENT
Start: 2023-11-07 | End: 2023-11-15 | Stop reason: HOSPADM

## 2023-11-07 RX ORDER — SODIUM CHLORIDE 0.9 % (FLUSH) 0.9 %
5-40 SYRINGE (ML) INJECTION PRN
Status: DISCONTINUED | OUTPATIENT
Start: 2023-11-07 | End: 2023-11-07

## 2023-11-07 RX ORDER — TRANEXAMIC ACID 10 MG/ML
INJECTION, SOLUTION INTRAVENOUS PRN
Status: DISCONTINUED | OUTPATIENT
Start: 2023-11-07 | End: 2023-11-07 | Stop reason: SDUPTHER

## 2023-11-07 RX ORDER — ACETAMINOPHEN 500 MG
1000 TABLET ORAL EVERY 6 HOURS PRN
Status: DISCONTINUED | OUTPATIENT
Start: 2023-11-07 | End: 2023-11-07

## 2023-11-07 RX ORDER — ONDANSETRON 2 MG/ML
4 INJECTION INTRAMUSCULAR; INTRAVENOUS EVERY 6 HOURS PRN
Status: DISCONTINUED | OUTPATIENT
Start: 2023-11-07 | End: 2023-11-15 | Stop reason: HOSPADM

## 2023-11-07 RX ORDER — ONDANSETRON 2 MG/ML
INJECTION INTRAMUSCULAR; INTRAVENOUS PRN
Status: DISCONTINUED | OUTPATIENT
Start: 2023-11-07 | End: 2023-11-07 | Stop reason: SDUPTHER

## 2023-11-07 RX ORDER — KETAMINE HCL IN NACL, ISO-OSM 100MG/10ML
SYRINGE (ML) INJECTION PRN
Status: DISCONTINUED | OUTPATIENT
Start: 2023-11-07 | End: 2023-11-07 | Stop reason: SDUPTHER

## 2023-11-07 RX ORDER — MIDODRINE HYDROCHLORIDE 2.5 MG/1
2.5 TABLET ORAL 2 TIMES DAILY PRN
Status: DISCONTINUED | OUTPATIENT
Start: 2023-11-07 | End: 2023-11-15 | Stop reason: HOSPADM

## 2023-11-07 RX ORDER — MEPERIDINE HYDROCHLORIDE 50 MG/ML
12.5 INJECTION INTRAMUSCULAR; INTRAVENOUS; SUBCUTANEOUS EVERY 5 MIN PRN
Status: DISCONTINUED | OUTPATIENT
Start: 2023-11-07 | End: 2023-11-07

## 2023-11-07 RX ORDER — FENTANYL CITRATE 50 UG/ML
100 INJECTION, SOLUTION INTRAMUSCULAR; INTRAVENOUS ONCE
Status: COMPLETED | OUTPATIENT
Start: 2023-11-07 | End: 2023-11-07

## 2023-11-07 RX ORDER — KETOROLAC TROMETHAMINE 30 MG/ML
INJECTION, SOLUTION INTRAMUSCULAR; INTRAVENOUS PRN
Status: DISCONTINUED | OUTPATIENT
Start: 2023-11-07 | End: 2023-11-07 | Stop reason: SDUPTHER

## 2023-11-07 RX ORDER — LABETALOL HYDROCHLORIDE 5 MG/ML
INJECTION, SOLUTION INTRAVENOUS PRN
Status: DISCONTINUED | OUTPATIENT
Start: 2023-11-07 | End: 2023-11-07 | Stop reason: SDUPTHER

## 2023-11-07 RX ORDER — INSULIN LISPRO 100 [IU]/ML
0-8 INJECTION, SOLUTION INTRAVENOUS; SUBCUTANEOUS
Status: DISCONTINUED | OUTPATIENT
Start: 2023-11-08 | End: 2023-11-08

## 2023-11-07 RX ORDER — PRAVASTATIN SODIUM 20 MG
40 TABLET ORAL NIGHTLY
Status: DISCONTINUED | OUTPATIENT
Start: 2023-11-07 | End: 2023-11-15 | Stop reason: HOSPADM

## 2023-11-07 RX ORDER — OXYMETAZOLINE HYDROCHLORIDE 0.05 G/100ML
2 SPRAY NASAL 2 TIMES DAILY PRN
Status: DISPENSED | OUTPATIENT
Start: 2023-11-07 | End: 2023-11-10

## 2023-11-07 RX ORDER — DEXAMETHASONE SODIUM PHOSPHATE 10 MG/ML
INJECTION INTRAMUSCULAR; INTRAVENOUS PRN
Status: DISCONTINUED | OUTPATIENT
Start: 2023-11-07 | End: 2023-11-07 | Stop reason: SDUPTHER

## 2023-11-07 RX ORDER — FOLIC ACID 1 MG/1
1 TABLET ORAL DAILY
Status: DISCONTINUED | OUTPATIENT
Start: 2023-11-07 | End: 2023-11-15 | Stop reason: HOSPADM

## 2023-11-07 RX ORDER — MORPHINE SULFATE 2 MG/ML
1 INJECTION, SOLUTION INTRAMUSCULAR; INTRAVENOUS EVERY 4 HOURS PRN
Status: DISCONTINUED | OUTPATIENT
Start: 2023-11-07 | End: 2023-11-07

## 2023-11-07 RX ORDER — PANTOPRAZOLE SODIUM 40 MG/1
40 TABLET, DELAYED RELEASE ORAL DAILY PRN
Status: DISCONTINUED | OUTPATIENT
Start: 2023-11-07 | End: 2023-11-15 | Stop reason: HOSPADM

## 2023-11-07 RX ORDER — POLYETHYLENE GLYCOL 3350 17 G/17G
17 POWDER, FOR SOLUTION ORAL DAILY PRN
Status: DISCONTINUED | OUTPATIENT
Start: 2023-11-07 | End: 2023-11-15 | Stop reason: HOSPADM

## 2023-11-07 RX ORDER — METOCLOPRAMIDE HYDROCHLORIDE 5 MG/ML
10 INJECTION INTRAMUSCULAR; INTRAVENOUS
Status: DISCONTINUED | OUTPATIENT
Start: 2023-11-07 | End: 2023-11-07

## 2023-11-07 RX ORDER — BUPRENORPHINE 5 UG/H
1 PATCH TRANSDERMAL WEEKLY
Status: DISCONTINUED | OUTPATIENT
Start: 2023-11-08 | End: 2023-11-15 | Stop reason: HOSPADM

## 2023-11-07 RX ORDER — BUPROPION HYDROCHLORIDE 150 MG/1
150 TABLET ORAL EVERY MORNING
Status: DISCONTINUED | OUTPATIENT
Start: 2023-11-08 | End: 2023-11-15 | Stop reason: HOSPADM

## 2023-11-07 RX ORDER — ENOXAPARIN SODIUM 100 MG/ML
40 INJECTION SUBCUTANEOUS DAILY
Status: DISCONTINUED | OUTPATIENT
Start: 2023-11-07 | End: 2023-11-07

## 2023-11-07 RX ORDER — BACLOFEN 10 MG/1
20 TABLET ORAL 2 TIMES DAILY
Status: DISCONTINUED | OUTPATIENT
Start: 2023-11-07 | End: 2023-11-15 | Stop reason: HOSPADM

## 2023-11-07 RX ORDER — SODIUM CHLORIDE 9 MG/ML
INJECTION, SOLUTION INTRAVENOUS PRN
Status: DISCONTINUED | OUTPATIENT
Start: 2023-11-07 | End: 2023-11-07

## 2023-11-07 RX ORDER — LIDOCAINE HYDROCHLORIDE 10 MG/ML
INJECTION, SOLUTION EPIDURAL; INFILTRATION; INTRACAUDAL; PERINEURAL PRN
Status: DISCONTINUED | OUTPATIENT
Start: 2023-11-07 | End: 2023-11-07 | Stop reason: SDUPTHER

## 2023-11-07 RX ORDER — VANCOMYCIN HYDROCHLORIDE 1 G/20ML
INJECTION, POWDER, LYOPHILIZED, FOR SOLUTION INTRAVENOUS PRN
Status: DISCONTINUED | OUTPATIENT
Start: 2023-11-07 | End: 2023-11-07 | Stop reason: ALTCHOICE

## 2023-11-07 RX ORDER — DEXTROSE MONOHYDRATE 100 MG/ML
INJECTION, SOLUTION INTRAVENOUS CONTINUOUS PRN
Status: DISCONTINUED | OUTPATIENT
Start: 2023-11-07 | End: 2023-11-15 | Stop reason: HOSPADM

## 2023-11-07 RX ORDER — BENZONATATE 100 MG/1
100 CAPSULE ORAL 3 TIMES DAILY PRN
Status: DISCONTINUED | OUTPATIENT
Start: 2023-11-07 | End: 2023-11-15 | Stop reason: HOSPADM

## 2023-11-07 RX ORDER — CYCLOBENZAPRINE HCL 10 MG
10 TABLET ORAL EVERY 6 HOURS
Status: DISCONTINUED | OUTPATIENT
Start: 2023-11-07 | End: 2023-11-15 | Stop reason: HOSPADM

## 2023-11-07 RX ORDER — ONDANSETRON 4 MG/1
4 TABLET, ORALLY DISINTEGRATING ORAL EVERY 8 HOURS PRN
Status: DISCONTINUED | OUTPATIENT
Start: 2023-11-07 | End: 2023-11-15 | Stop reason: HOSPADM

## 2023-11-07 RX ORDER — DROPERIDOL 2.5 MG/ML
0.62 INJECTION, SOLUTION INTRAMUSCULAR; INTRAVENOUS
Status: DISCONTINUED | OUTPATIENT
Start: 2023-11-07 | End: 2023-11-07

## 2023-11-07 RX ORDER — OXYCODONE HYDROCHLORIDE 5 MG/1
5 TABLET ORAL EVERY 4 HOURS PRN
Status: DISCONTINUED | OUTPATIENT
Start: 2023-11-07 | End: 2023-11-12

## 2023-11-07 RX ORDER — LORAZEPAM 0.5 MG/1
0.5 TABLET ORAL EVERY 8 HOURS PRN
Status: DISCONTINUED | OUTPATIENT
Start: 2023-11-07 | End: 2023-11-15 | Stop reason: HOSPADM

## 2023-11-07 RX ORDER — INSULIN GLARGINE 100 [IU]/ML
10 INJECTION, SOLUTION SUBCUTANEOUS NIGHTLY
Status: DISCONTINUED | OUTPATIENT
Start: 2023-11-07 | End: 2023-11-15 | Stop reason: HOSPADM

## 2023-11-07 RX ADMIN — BACLOFEN 20 MG: 10 TABLET ORAL at 21:33

## 2023-11-07 RX ADMIN — Medication 2 G: at 12:16

## 2023-11-07 RX ADMIN — HYDROMORPHONE HYDROCHLORIDE 0.5 MG: 1 INJECTION, SOLUTION INTRAMUSCULAR; INTRAVENOUS; SUBCUTANEOUS at 16:56

## 2023-11-07 RX ADMIN — Medication 2.5 MG: at 14:11

## 2023-11-07 RX ADMIN — FENTANYL CITRATE 50 MCG: 50 INJECTION, SOLUTION INTRAMUSCULAR; INTRAVENOUS at 13:23

## 2023-11-07 RX ADMIN — FENTANYL CITRATE 100 MCG: 50 INJECTION, SOLUTION INTRAMUSCULAR; INTRAVENOUS at 16:52

## 2023-11-07 RX ADMIN — FENTANYL CITRATE 100 MCG: 50 INJECTION, SOLUTION INTRAMUSCULAR; INTRAVENOUS at 15:03

## 2023-11-07 RX ADMIN — ROCURONIUM BROMIDE 20 MG: 10 INJECTION, SOLUTION INTRAVENOUS at 15:02

## 2023-11-07 RX ADMIN — FENTANYL CITRATE 50 MCG: 50 INJECTION, SOLUTION INTRAMUSCULAR; INTRAVENOUS at 12:02

## 2023-11-07 RX ADMIN — HYDROMORPHONE HYDROCHLORIDE 0.5 MG: 1 INJECTION, SOLUTION INTRAMUSCULAR; INTRAVENOUS; SUBCUTANEOUS at 18:10

## 2023-11-07 RX ADMIN — OXYCODONE HYDROCHLORIDE 10 MG: 5 TABLET ORAL at 19:51

## 2023-11-07 RX ADMIN — HYDROMORPHONE HYDROCHLORIDE 0.5 MG: 1 INJECTION, SOLUTION INTRAMUSCULAR; INTRAVENOUS; SUBCUTANEOUS at 16:57

## 2023-11-07 RX ADMIN — ROCURONIUM BROMIDE 10 MG: 10 INJECTION, SOLUTION INTRAVENOUS at 13:56

## 2023-11-07 RX ADMIN — ROCURONIUM BROMIDE 10 MG: 10 INJECTION, SOLUTION INTRAVENOUS at 12:34

## 2023-11-07 RX ADMIN — ROCURONIUM BROMIDE 10 MG: 10 INJECTION, SOLUTION INTRAVENOUS at 13:30

## 2023-11-07 RX ADMIN — Medication 20 MG: at 12:30

## 2023-11-07 RX ADMIN — ACETAMINOPHEN 1000 MG: 500 TABLET ORAL at 19:52

## 2023-11-07 RX ADMIN — FENTANYL CITRATE 50 MCG: 50 INJECTION, SOLUTION INTRAMUSCULAR; INTRAVENOUS at 12:57

## 2023-11-07 RX ADMIN — MIDAZOLAM 2 MG: 1 INJECTION INTRAMUSCULAR; INTRAVENOUS at 11:41

## 2023-11-07 RX ADMIN — FENTANYL CITRATE 50 MCG: 50 INJECTION, SOLUTION INTRAMUSCULAR; INTRAVENOUS at 13:30

## 2023-11-07 RX ADMIN — INSULIN GLARGINE 10 UNITS: 100 INJECTION, SOLUTION SUBCUTANEOUS at 21:35

## 2023-11-07 RX ADMIN — SODIUM CHLORIDE, POTASSIUM CHLORIDE, SODIUM LACTATE AND CALCIUM CHLORIDE: 600; 310; 30; 20 INJECTION, SOLUTION INTRAVENOUS at 13:31

## 2023-11-07 RX ADMIN — TRANEXAMIC ACID 1 G: 10 INJECTION, SOLUTION INTRAVENOUS at 15:45

## 2023-11-07 RX ADMIN — Medication 10 MG: at 15:02

## 2023-11-07 RX ADMIN — FENTANYL CITRATE 50 MCG: 50 INJECTION, SOLUTION INTRAMUSCULAR; INTRAVENOUS at 13:13

## 2023-11-07 RX ADMIN — Medication 30 MG: at 14:02

## 2023-11-07 RX ADMIN — LIDOCAINE HYDROCHLORIDE 50 MG: 10 INJECTION, SOLUTION EPIDURAL; INFILTRATION; INTRACAUDAL; PERINEURAL at 11:46

## 2023-11-07 RX ADMIN — ROCURONIUM BROMIDE 10 MG: 10 INJECTION, SOLUTION INTRAVENOUS at 12:21

## 2023-11-07 RX ADMIN — FENTANYL CITRATE 50 MCG: 50 INJECTION, SOLUTION INTRAMUSCULAR; INTRAVENOUS at 14:20

## 2023-11-07 RX ADMIN — Medication 30 MG: at 12:21

## 2023-11-07 RX ADMIN — MORPHINE SULFATE 1 MG: 2 INJECTION, SOLUTION INTRAMUSCULAR; INTRAVENOUS at 22:46

## 2023-11-07 RX ADMIN — DEXAMETHASONE SODIUM PHOSPHATE 10 MG: 10 INJECTION INTRAMUSCULAR; INTRAVENOUS at 12:10

## 2023-11-07 RX ADMIN — TRANEXAMIC ACID 1 G: 10 INJECTION, SOLUTION INTRAVENOUS at 12:15

## 2023-11-07 RX ADMIN — SODIUM CHLORIDE, POTASSIUM CHLORIDE, SODIUM LACTATE AND CALCIUM CHLORIDE: 600; 310; 30; 20 INJECTION, SOLUTION INTRAVENOUS at 11:03

## 2023-11-07 RX ADMIN — FENTANYL CITRATE 100 MCG: 50 INJECTION, SOLUTION INTRAMUSCULAR; INTRAVENOUS at 11:03

## 2023-11-07 RX ADMIN — FENTANYL CITRATE 100 MCG: 50 INJECTION, SOLUTION INTRAMUSCULAR; INTRAVENOUS at 09:40

## 2023-11-07 RX ADMIN — MAGNESIUM SULFATE HEPTAHYDRATE 1000 MG: 1 INJECTION, SOLUTION INTRAVENOUS at 17:12

## 2023-11-07 RX ADMIN — GABAPENTIN 100 MG: 100 CAPSULE ORAL at 19:52

## 2023-11-07 RX ADMIN — FENTANYL CITRATE 50 MCG: 50 INJECTION, SOLUTION INTRAMUSCULAR; INTRAVENOUS at 12:33

## 2023-11-07 RX ADMIN — ONDANSETRON 4 MG: 2 INJECTION INTRAMUSCULAR; INTRAVENOUS at 16:13

## 2023-11-07 RX ADMIN — FENTANYL CITRATE 100 MCG: 50 INJECTION, SOLUTION INTRAMUSCULAR; INTRAVENOUS at 15:45

## 2023-11-07 RX ADMIN — CEFAZOLIN 2 G: 1 INJECTION, POWDER, FOR SOLUTION INTRAMUSCULAR; INTRAVENOUS at 16:12

## 2023-11-07 RX ADMIN — HYDROMORPHONE HYDROCHLORIDE 1 MG: 1 INJECTION, SOLUTION INTRAMUSCULAR; INTRAVENOUS; SUBCUTANEOUS at 17:04

## 2023-11-07 RX ADMIN — PROPOFOL 150 MG: 10 INJECTION, EMULSION INTRAVENOUS at 11:50

## 2023-11-07 RX ADMIN — ROCURONIUM BROMIDE 10 MG: 10 INJECTION, SOLUTION INTRAVENOUS at 12:38

## 2023-11-07 RX ADMIN — FENTANYL CITRATE 50 MCG: 50 INJECTION, SOLUTION INTRAMUSCULAR; INTRAVENOUS at 11:50

## 2023-11-07 RX ADMIN — SODIUM CHLORIDE, POTASSIUM CHLORIDE, SODIUM LACTATE AND CALCIUM CHLORIDE: 600; 310; 30; 20 INJECTION, SOLUTION INTRAVENOUS at 16:42

## 2023-11-07 RX ADMIN — Medication 10 MG: at 14:30

## 2023-11-07 RX ADMIN — HYDROMORPHONE HYDROCHLORIDE 0.5 MG: 1 INJECTION, SOLUTION INTRAMUSCULAR; INTRAVENOUS; SUBCUTANEOUS at 17:47

## 2023-11-07 RX ADMIN — LORAZEPAM 0.5 MG: 0.5 TABLET ORAL at 20:15

## 2023-11-07 RX ADMIN — KETOROLAC TROMETHAMINE 30 MG: 30 INJECTION, SOLUTION INTRAMUSCULAR; INTRAVENOUS at 17:06

## 2023-11-07 RX ADMIN — ROCURONIUM BROMIDE 50 MG: 10 INJECTION, SOLUTION INTRAVENOUS at 11:50

## 2023-11-07 RX ADMIN — FENTANYL CITRATE 100 MCG: 50 INJECTION, SOLUTION INTRAMUSCULAR; INTRAVENOUS at 16:23

## 2023-11-07 RX ADMIN — MIDAZOLAM HYDROCHLORIDE 2 MG: 1 INJECTION, SOLUTION INTRAMUSCULAR; INTRAVENOUS at 11:33

## 2023-11-07 RX ADMIN — ROCURONIUM BROMIDE 10 MG: 10 INJECTION, SOLUTION INTRAVENOUS at 13:14

## 2023-11-07 RX ADMIN — FENTANYL CITRATE 50 MCG: 50 INJECTION, SOLUTION INTRAMUSCULAR; INTRAVENOUS at 14:30

## 2023-11-07 RX ADMIN — FENTANYL CITRATE 50 MCG: 50 INJECTION, SOLUTION INTRAMUSCULAR; INTRAVENOUS at 12:34

## 2023-11-07 RX ADMIN — ROCURONIUM BROMIDE 10 MG: 10 INJECTION, SOLUTION INTRAVENOUS at 14:30

## 2023-11-07 ASSESSMENT — PAIN DESCRIPTION - ORIENTATION
ORIENTATION: LEFT

## 2023-11-07 ASSESSMENT — PAIN SCALES - GENERAL
PAINLEVEL_OUTOF10: 7
PAINLEVEL_OUTOF10: 8
PAINLEVEL_OUTOF10: 10

## 2023-11-07 ASSESSMENT — PAIN DESCRIPTION - LOCATION
LOCATION: HIP

## 2023-11-07 ASSESSMENT — PAIN DESCRIPTION - DESCRIPTORS
DESCRIPTORS: BURNING
DESCRIPTORS: ACHING
DESCRIPTORS: ACHING;BURNING
DESCRIPTORS: BURNING
DESCRIPTORS: ACHING
DESCRIPTORS: ACHING;BURNING

## 2023-11-07 ASSESSMENT — PAIN DESCRIPTION - PAIN TYPE: TYPE: SURGICAL PAIN

## 2023-11-07 ASSESSMENT — PAIN - FUNCTIONAL ASSESSMENT: PAIN_FUNCTIONAL_ASSESSMENT: 0-10

## 2023-11-07 NOTE — BRIEF OP NOTE
Brief Postoperative Note      Patient: Kristen Hope  YOB: 1980  MRN: [id number]    Date of Procedure: 11/7/2023    Pre-Op Diagnosis Codes:  Left Prosthetic Joint Infection    Post-Op Diagnosis: Left Prosthetic Joint Infection       Procedure(s):  -Removal of left hip prosthesis with placement of antibiotic spacer  -Irrigation and excisional debridement of the skin down to and  including the bone of 10 x 20 cm wound to left hip    Surgeon(s):  Ammon Lennon DO    Assistant:  Resident: Valarie Solis DO    Anesthesia: General    Estimated Blood Loss (mL): 1,000    Fluids: 3,573 mL's    Complications: None    Specimens:   * No specimens in log *    Implants:  Implant Name Type Inv.  Item Serial No.  Lot No. LRB No. Used Action   CEMENT BONE 40 GM W/ GENTMYCN HI VISC PALACOS R+G - [id number]  CEMENT BONE 40 GM W/ GENTMYCN HI VISC PALACOS R+G  [employer] [id number] Left 1 Implanted   CEMENT BONE 40 GM W/ GENTMYCN HI VISC PALACOS R+G - [id number]  CEMENT BONE 40 GM W/ GENTMYCN HI VISC PALACOS R+G  [employer] [id number] Left 1 Implanted   stem centralizer     [id number] Left 1 Implanted   STEM FEM SZ 3 L200MM STD OFFSET L HIP CO CHROM PROSTALAC - [id number]  STEM FEM SZ 3 L200MM STD OFFSET L HIP CO CHROM PROSTALAC  [facility] [id number] Left 1 Implanted   IMPL HIP FEM HEAD TAPR 12/14 28MM +5 - [id number] Hip IMPL HIP FEM HEAD TAPR 12/14 28MM +5  JN: [employer] [id number] Left 1 Explanted   HEAD BPLR OD49MM ID28MM FEM HIP SELF CNTR - [id number] Hip HEAD BPLR OD49MM ID28MM FEM HIP SELF CNTR  [facility] [id number] Left 1 Explanted   CORAIL AMT SZ14 135 HIGH COL - [id number] Hip CORAIL AMT SZ14 135 HIGH COL  [facility] [id number] Left 1 Explanted   HEAD FEM [id number] +8.5MM OFFSET HIP ULT STD ARTC EZ 12/14 - [id number]  HEAD FEM [id number] +8.5MM OFFSET HIP ULT STD ARTC EZ 12/14  [facility] [employer]- [id number]

## 2023-11-07 NOTE — DISCHARGE INSTRUCTIONS
Orthopaedic Instructions:  -Weight bearing status: Weight bearing as tolerated with the left leg with a walker  -Precautions to avoid hip dislocation:  -Keep your knees apart. Don't cross your legs.  -Sleep with a pillow between your legs for the first 3 days  -Step up first with your unaffected leg. Then bring the affected leg up to the same step. Bring your crutches or cane up.  -Don't lift your knee higher than your hip.  -Don't lean forward while you sit down or stand up, and don't bend past 90 degrees (like the angle in a letter \"L\"). This means you can't try to  something off the floor or bend down to tie your shoes.  -Don't sit on low chairs, beds, or toilets. You may want to use a raised toilet seat for a while. Sit in chairs with arms.    -Do not remove wound VAC. -Always look for signs of compartment syndrome: pain out of proportion to the injury, pain not controlled with pain medication, numbness in digits, changing of color of digits (paleness). If these signs occur return to ED immediately for reassessment.  -Always work on toe motion (to non-injured toes) while in splint to decrease swelling.  -Ice (20 minutes on and off 1 hour) and elevate above the level of the heart to reduce swelling and throbbing pain.  -Call the office or come to Emergency Room if signs of infection appear (hot, swollen, red, draining pus, fever)  -Take medications as prescribed.  -Wean off narcotics (percocet/norco) as soon as possible. Do not take tylenol if still taking narcotics.  -Follow up with Dr. Son Ortega in his office on 11/27/24 at 1:45 PM. Call [phone] with any questions/concerns.

## 2023-11-07 NOTE — INTERVAL H&P NOTE
Pt Name: Brad Moralez  MRN: [id number]  YOB: 1980  Date of evaluation: 11/7/2023    I have reviewed the patient's history and physical examination completed on 10/24/23 prior to [address] [place] [address] Road - Left. No changes to history or on examination today, unless noted below. She presents today for DEPUY SPACER HIP EXPLANT, REPLACE DEPUY SPACER HIP- LEFT. She reports pain level is intense today as she has not had pain medicine today. She reports she has been having large amount of wound vac output since yesterday. She denies any other changes to health status. She is accompanied by her mother today.      BERNARDA Clinton - CNP  11/7/23  9:36 AM

## 2023-11-07 NOTE — PLAN OF CARE
Orthopedic Surgery Update Progress Note:    Nba Drake is a 39 y.o. 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  - Abduction pillow on in bed. Posterior hip precautions. - Optifoam dressings on.    - 2 hemovac drains. Large drain is deep. Small drain is superficial.  Will pull when output is <30 cc's  - IM on for medical management  - Ok for diet  - Post op Ancef on.   Abx per ID recs  - Pain control with multimodal pain management  - [facility]  - Page orthopedic resident on call with questions    Merle Puckett DO  Orthopedic Surgery Resident, PGY-3  [address]  [place], [place]

## 2023-11-07 NOTE — ANESTHESIA PRE PROCEDURE
Department of Anesthesiology  Preprocedure Note       Name:  Josefina Dorman   Age:  39 y.o.  :  1980                                          MRN:  [id number]         Date:  2023      Surgeon: Kemal Lugo):  Nessa Mitchell DO    Procedure: Procedure(s):  DEPUY SPACER HIP EXPLANT, REPLACE DEPUY SPACER HIP (SYNTHES DEPUY EXTRACTION SET, LATERAL ON BEAN BAG)    Medications prior to admission:   Prior to Admission medications    Medication Sig Start Date End Date Taking? Authorizing Provider   UNABLE TO FIND Picc line flush 2 times per day and before and after medication   Yes Arsen Owens MD   LORazepam (ATIVAN) 0.5 MG tablet Take 1 tablet by mouth every 8 hours as needed for Anxiety. Max Daily Amount: 1.5 mg   Yes Arsen Owens MD   oxyCODONE HCl (OXY-IR) 10 MG immediate release tablet Take 0.5 tablets by mouth every 4 hours as needed for Pain. Yes Arsen Owens MD   cefTRIAXone sodium 2 g SOLR Infuse 2 g intravenously 2 times daily at 0800 and 1400    Arsen Owens MD   baclofen (LIORESAL) 10 MG tablet Take 2 tablets by mouth 2 times daily    Arsen Owens MD   buprenorphine (BUPRENEX) 5 MCG/HR PTWK Place 1 patch onto the skin once a week. Apply @ bedtime every 7 days for lateral hip pain    Arsen Owens MD   insulin glargine (LANTUS SOLOSTAR) 100 UNIT/ML injection Inject 10 Units into the skin nightly Pen-injector    Arsen Owens MD   oxyCODONE-acetaminophen (PERCOCET) 5-325 MG per tablet Take 2 tablets by mouth every 4 hours as needed for Pain (For severe hip pain).     Arsen Owens MD   buPROPion (WELLBUTRIN XL) 150 MG extended release tablet Take 1 tablet by mouth every morning    Arsen Owens MD   FLUoxetine (PROZAC) 40 MG capsule Take 1 capsule by mouth daily 23   Sukhdev Black MD   metoprolol tartrate (LOPRESSOR) 25 MG tablet Take 1 tablet by mouth daily 7/10/23   Arsen Owesn MD   valACYclovir

## 2023-11-07 NOTE — PROGRESS NOTES
Labs drawn and sent    Dr Leonie Pierre states pt bs of 269 ok until she gets to floor for night time lantus

## 2023-11-08 PROBLEM — A49.01 MSSA (METHICILLIN SUSCEPTIBLE STAPHYLOCOCCUS AUREUS) INFECTION: Status: ACTIVE | Noted: 2023-11-08

## 2023-11-08 PROBLEM — E11.9 DIABETES MELLITUS (HCC): Status: ACTIVE | Noted: 2023-04-11

## 2023-11-08 LAB
ANION GAP SERPL CALCULATED.3IONS-SCNC: 12 MMOL/L (ref 9–17)
BASOPHILS # BLD: <0.03 K/UL (ref 0–0.2)
BASOPHILS NFR BLD: 0 % (ref 0–2)
BUN SERPL-MCNC: 14 MG/DL (ref 6–20)
CALCIUM SERPL-MCNC: 7.8 MG/DL (ref 8.6–10.4)
CHLORIDE SERPL-SCNC: 100 MMOL/L (ref 98–107)
CO2 SERPL-SCNC: 21 MMOL/L (ref 20–31)
CREAT SERPL-MCNC: 0.7 MG/DL (ref 0.5–0.9)
CRP SERPL HS-MCNC: 57 MG/L (ref 0–5)
EOSINOPHIL # BLD: <0.03 K/UL (ref 0–0.44)
EOSINOPHILS RELATIVE PERCENT: 0 % (ref 1–4)
ERYTHROCYTE [DISTWIDTH] IN BLOOD BY AUTOMATED COUNT: 14.2 % (ref 11.8–14.4)
GFR SERPL CREATININE-BSD FRML MDRD: >60 ML/MIN/1.73M2
GLUCOSE BLD-MCNC: 156 MG/DL (ref 65–105)
GLUCOSE BLD-MCNC: 206 MG/DL (ref 65–105)
GLUCOSE SERPL-MCNC: 475 MG/DL (ref 70–99)
HCT VFR BLD AUTO: 26.2 % (ref 36.3–47.1)
HGB BLD-MCNC: 7.7 G/DL (ref 11.9–15.1)
IMM GRANULOCYTES # BLD AUTO: 0.04 K/UL (ref 0–0.3)
IMM GRANULOCYTES NFR BLD: 0 %
LYMPHOCYTES NFR BLD: 1.63 K/UL (ref 1.1–3.7)
LYMPHOCYTES RELATIVE PERCENT: 15 % (ref 24–43)
MCH RBC QN AUTO: 28.1 PG (ref 25.2–33.5)
MCHC RBC AUTO-ENTMCNC: 29.4 G/DL (ref 28.4–34.8)
MCV RBC AUTO: 95.6 FL (ref 82.6–102.9)
MONOCYTES NFR BLD: 0.9 K/UL (ref 0.1–1.2)
MONOCYTES NFR BLD: 8 % (ref 3–12)
NEUTROPHILS NFR BLD: 76 % (ref 36–65)
NEUTS SEG NFR BLD: 8.17 K/UL (ref 1.5–8.1)
NRBC BLD-RTO: 0 PER 100 WBC
PLATELET # BLD AUTO: 330 K/UL (ref 138–453)
PMV BLD AUTO: 11.7 FL (ref 8.1–13.5)
POTASSIUM SERPL-SCNC: 5.1 MMOL/L (ref 3.7–5.3)
RBC # BLD AUTO: 2.74 M/UL (ref 3.95–5.11)
SODIUM SERPL-SCNC: 133 MMOL/L (ref 135–144)
WBC OTHER # BLD: 10.8 K/UL (ref 3.5–11.3)

## 2023-11-08 PROCEDURE — 97530 THERAPEUTIC ACTIVITIES: CPT

## 2023-11-08 PROCEDURE — 6360000002 HC RX W HCPCS

## 2023-11-08 PROCEDURE — 99222 1ST HOSP IP/OBS MODERATE 55: CPT | Performed by: INTERNAL MEDICINE

## 2023-11-08 PROCEDURE — 97162 PT EVAL MOD COMPLEX 30 MIN: CPT

## 2023-11-08 PROCEDURE — 82947 ASSAY GLUCOSE BLOOD QUANT: CPT

## 2023-11-08 PROCEDURE — 36415 COLL VENOUS BLD VENIPUNCTURE: CPT

## 2023-11-08 PROCEDURE — 86140 C-REACTIVE PROTEIN: CPT

## 2023-11-08 PROCEDURE — 6360000002 HC RX W HCPCS: Performed by: NURSE PRACTITIONER

## 2023-11-08 PROCEDURE — 6370000000 HC RX 637 (ALT 250 FOR IP)

## 2023-11-08 PROCEDURE — 97166 OT EVAL MOD COMPLEX 45 MIN: CPT

## 2023-11-08 PROCEDURE — 1200000000 HC SEMI PRIVATE

## 2023-11-08 PROCEDURE — 6370000000 HC RX 637 (ALT 250 FOR IP): Performed by: NURSE PRACTITIONER

## 2023-11-08 PROCEDURE — 83036 HEMOGLOBIN GLYCOSYLATED A1C: CPT

## 2023-11-08 PROCEDURE — 6370000000 HC RX 637 (ALT 250 FOR IP): Performed by: STUDENT IN AN ORGANIZED HEALTH CARE EDUCATION/TRAINING PROGRAM

## 2023-11-08 PROCEDURE — 85025 COMPLETE CBC W/AUTO DIFF WBC: CPT

## 2023-11-08 PROCEDURE — 2580000003 HC RX 258

## 2023-11-08 PROCEDURE — 97167 OT EVAL HIGH COMPLEX 60 MIN: CPT

## 2023-11-08 PROCEDURE — 80048 BASIC METABOLIC PNL TOTAL CA: CPT

## 2023-11-08 RX ORDER — MULTIVITAMIN WITH IRON
1 TABLET ORAL DAILY
Status: DISCONTINUED | OUTPATIENT
Start: 2023-11-08 | End: 2023-11-15 | Stop reason: HOSPADM

## 2023-11-08 RX ORDER — KETOROLAC TROMETHAMINE 30 MG/ML
30 INJECTION, SOLUTION INTRAMUSCULAR; INTRAVENOUS EVERY 6 HOURS
Status: COMPLETED | OUTPATIENT
Start: 2023-11-08 | End: 2023-11-10

## 2023-11-08 RX ORDER — INSULIN LISPRO 100 [IU]/ML
0-16 INJECTION, SOLUTION INTRAVENOUS; SUBCUTANEOUS
Status: DISCONTINUED | OUTPATIENT
Start: 2023-11-08 | End: 2023-11-15 | Stop reason: HOSPADM

## 2023-11-08 RX ORDER — INSULIN GLARGINE 100 [IU]/ML
12 INJECTION, SOLUTION SUBCUTANEOUS DAILY
Status: DISCONTINUED | OUTPATIENT
Start: 2023-11-08 | End: 2023-11-08

## 2023-11-08 RX ORDER — FENTANYL CITRATE 50 UG/ML
50 INJECTION, SOLUTION INTRAMUSCULAR; INTRAVENOUS ONCE
Status: DISCONTINUED | OUTPATIENT
Start: 2023-11-08 | End: 2023-11-10

## 2023-11-08 RX ORDER — INSULIN LISPRO 100 [IU]/ML
0-4 INJECTION, SOLUTION INTRAVENOUS; SUBCUTANEOUS NIGHTLY
Status: DISCONTINUED | OUTPATIENT
Start: 2023-11-08 | End: 2023-11-15 | Stop reason: HOSPADM

## 2023-11-08 RX ORDER — LANOLIN ALCOHOL/MO/W.PET/CERES
100 CREAM (GRAM) TOPICAL DAILY
Status: DISCONTINUED | OUTPATIENT
Start: 2023-11-08 | End: 2023-11-15 | Stop reason: HOSPADM

## 2023-11-08 RX ORDER — FENTANYL CITRATE 50 UG/ML
INJECTION, SOLUTION INTRAMUSCULAR; INTRAVENOUS
Status: COMPLETED
Start: 2023-11-08 | End: 2023-11-08

## 2023-11-08 RX ORDER — RIFAMPIN 300 MG/1
600 CAPSULE ORAL DAILY
Status: DISCONTINUED | OUTPATIENT
Start: 2023-11-08 | End: 2023-11-15 | Stop reason: HOSPADM

## 2023-11-08 RX ORDER — INSULIN GLARGINE 100 [IU]/ML
24 INJECTION, SOLUTION SUBCUTANEOUS DAILY
Status: DISCONTINUED | OUTPATIENT
Start: 2023-11-08 | End: 2023-11-15 | Stop reason: HOSPADM

## 2023-11-08 RX ADMIN — KETOROLAC TROMETHAMINE 30 MG: 30 INJECTION, SOLUTION INTRAMUSCULAR; INTRAVENOUS at 08:35

## 2023-11-08 RX ADMIN — RIFAMPIN 600 MG: 300 CAPSULE ORAL at 16:27

## 2023-11-08 RX ADMIN — DOCUSATE SODIUM 100 MG: 100 CAPSULE, LIQUID FILLED ORAL at 22:23

## 2023-11-08 RX ADMIN — CYCLOBENZAPRINE 10 MG: 10 TABLET, FILM COATED ORAL at 11:19

## 2023-11-08 RX ADMIN — ACETAMINOPHEN 1000 MG: 500 TABLET ORAL at 00:37

## 2023-11-08 RX ADMIN — VALACYCLOVIR HYDROCHLORIDE 500 MG: 500 TABLET, FILM COATED ORAL at 08:33

## 2023-11-08 RX ADMIN — OXYCODONE HYDROCHLORIDE 10 MG: 5 TABLET ORAL at 12:49

## 2023-11-08 RX ADMIN — Medication 100 MG: at 08:33

## 2023-11-08 RX ADMIN — OXYCODONE HYDROCHLORIDE 10 MG: 5 TABLET ORAL at 20:31

## 2023-11-08 RX ADMIN — INSULIN LISPRO 4 UNITS: 100 INJECTION, SOLUTION INTRAVENOUS; SUBCUTANEOUS at 12:49

## 2023-11-08 RX ADMIN — GABAPENTIN 100 MG: 100 CAPSULE ORAL at 08:32

## 2023-11-08 RX ADMIN — FOLIC ACID 1 MG: 1 TABLET ORAL at 08:32

## 2023-11-08 RX ADMIN — KETOROLAC TROMETHAMINE 30 MG: 30 INJECTION, SOLUTION INTRAMUSCULAR; INTRAVENOUS at 17:45

## 2023-11-08 RX ADMIN — BACLOFEN 20 MG: 10 TABLET ORAL at 10:12

## 2023-11-08 RX ADMIN — INSULIN LISPRO 16 UNITS: 100 INJECTION, SOLUTION INTRAVENOUS; SUBCUTANEOUS at 08:46

## 2023-11-08 RX ADMIN — CYCLOBENZAPRINE 10 MG: 10 TABLET, FILM COATED ORAL at 17:44

## 2023-11-08 RX ADMIN — OXYCODONE HYDROCHLORIDE 10 MG: 5 TABLET ORAL at 00:36

## 2023-11-08 RX ADMIN — Medication 2000 MG: at 03:23

## 2023-11-08 RX ADMIN — LORAZEPAM 0.5 MG: 0.5 TABLET ORAL at 17:44

## 2023-11-08 RX ADMIN — FONDAPARINUX SODIUM 10 MG: 5 INJECTION, SOLUTION SUBCUTANEOUS at 10:12

## 2023-11-08 RX ADMIN — Medication 2000 MG: at 16:31

## 2023-11-08 RX ADMIN — PRAVASTATIN SODIUM 40 MG: 20 TABLET ORAL at 22:23

## 2023-11-08 RX ADMIN — ACETAMINOPHEN 1000 MG: 500 TABLET ORAL at 05:34

## 2023-11-08 RX ADMIN — DOCUSATE SODIUM 100 MG: 100 CAPSULE, LIQUID FILLED ORAL at 08:34

## 2023-11-08 RX ADMIN — OXYCODONE HYDROCHLORIDE 10 MG: 5 TABLET ORAL at 04:41

## 2023-11-08 RX ADMIN — LOSARTAN POTASSIUM 50 MG: 50 TABLET, FILM COATED ORAL at 08:33

## 2023-11-08 RX ADMIN — OXYCODONE HYDROCHLORIDE 10 MG: 5 TABLET ORAL at 08:40

## 2023-11-08 RX ADMIN — GABAPENTIN 100 MG: 100 CAPSULE ORAL at 16:29

## 2023-11-08 RX ADMIN — BACLOFEN 20 MG: 10 TABLET ORAL at 20:33

## 2023-11-08 RX ADMIN — FLUOXETINE HYDROCHLORIDE 40 MG: 20 CAPSULE ORAL at 08:33

## 2023-11-08 RX ADMIN — ALCOHOL 1 TABLET: 70.47 GEL TOPICAL at 08:33

## 2023-11-08 RX ADMIN — IRON SUCROSE 200 MG: 20 INJECTION, SOLUTION INTRAVENOUS at 12:52

## 2023-11-08 RX ADMIN — Medication 2000 MG: at 11:19

## 2023-11-08 RX ADMIN — INSULIN GLARGINE 24 UNITS: 100 INJECTION, SOLUTION SUBCUTANEOUS at 08:45

## 2023-11-08 RX ADMIN — INSULIN GLARGINE 10 UNITS: 100 INJECTION, SOLUTION SUBCUTANEOUS at 22:24

## 2023-11-08 RX ADMIN — LORAZEPAM 0.5 MG: 0.5 TABLET ORAL at 05:34

## 2023-11-08 RX ADMIN — OXYCODONE HYDROCHLORIDE 10 MG: 5 TABLET ORAL at 16:27

## 2023-11-08 RX ADMIN — FENTANYL CITRATE 100 MCG: 50 INJECTION, SOLUTION INTRAMUSCULAR; INTRAVENOUS at 05:23

## 2023-11-08 RX ADMIN — BUPROPION HYDROCHLORIDE 150 MG: 150 TABLET, EXTENDED RELEASE ORAL at 08:32

## 2023-11-08 RX ADMIN — ACETAMINOPHEN 1000 MG: 500 TABLET ORAL at 20:32

## 2023-11-08 RX ADMIN — CYCLOBENZAPRINE 10 MG: 10 TABLET, FILM COATED ORAL at 22:23

## 2023-11-08 RX ADMIN — ACETAMINOPHEN 1000 MG: 500 TABLET ORAL at 11:20

## 2023-11-08 RX ADMIN — METOPROLOL TARTRATE 25 MG: 25 TABLET ORAL at 08:33

## 2023-11-08 ASSESSMENT — PAIN DESCRIPTION - ORIENTATION
ORIENTATION: LEFT

## 2023-11-08 ASSESSMENT — PAIN DESCRIPTION - LOCATION
LOCATION: HIP

## 2023-11-08 ASSESSMENT — PAIN DESCRIPTION - DESCRIPTORS
DESCRIPTORS: DISCOMFORT
DESCRIPTORS: ACHING
DESCRIPTORS: DISCOMFORT
DESCRIPTORS: DISCOMFORT
DESCRIPTORS: ACHING
DESCRIPTORS: DISCOMFORT

## 2023-11-08 ASSESSMENT — PAIN SCALES - GENERAL
PAINLEVEL_OUTOF10: 6
PAINLEVEL_OUTOF10: 10
PAINLEVEL_OUTOF10: 7
PAINLEVEL_OUTOF10: 10
PAINLEVEL_OUTOF10: 7
PAINLEVEL_OUTOF10: 10
PAINLEVEL_OUTOF10: 9
PAINLEVEL_OUTOF10: 9
PAINLEVEL_OUTOF10: 10
PAINLEVEL_OUTOF10: 9
PAINLEVEL_OUTOF10: 7
PAINLEVEL_OUTOF10: 10
PAINLEVEL_OUTOF10: 7

## 2023-11-08 NOTE — PROGRESS NOTES
[address] [facility]  Internal Medicine Teaching Residency Program  Inpatient Daily Progress Note  ______________________________________________________________________________    Patient: Fredis Finnegan  YOB: 1980   KNF:[id number]    Acct: [de-identified]     Room: [address] [place] [address]  Admit date: 11/7/2023  Today's date: 11/08/23  Number of days in the hospital: 1    SUBJECTIVE   Admitting Diagnosis: S/P total left hip arthroplasty  CC: Left hip pain  Pt examined at bedside. Chart & results reviewed. Afebrile. Tachycardic, , on room air. Patient reports left hip pain status post antibiotic spacer and I&D yesterday. Wound VAC in place, 170 mL output. Blood glucose 475, started on high-dose sliding scale. Hemoglobin 7.7 likely secondary to blood loss from surgery yesterday. ROS:  Constitutional:  negative for chills, fevers, sweats  Respiratory:  negative for cough, dyspnea on exertion, hemoptysis, shortness of breath, wheezing  Cardiovascular:  negative for chest pain, chest pressure/discomfort, lower extremity edema, palpitations  Gastrointestinal:  negative for abdominal pain, constipation, diarrhea, nausea, vomiting  Neurological:  negative for dizziness, headache  BRIEF HISTORY     The patient is a pleasant 39 y.o. female with a past medical history of DM2 on insulin, antiphospholipid syndrome on fondaparinux, bipolar 1, recurrent falls secondary to alcohol use, Painting's esophagus presents for removal of left hip prosthesis with placement of antibiotic spacer and incision and drainage of chronic left hip infection by orthopedic surgery. Patient sustained a left femoral neck fracture secondary to fall while intoxicated for which she underwent left hemiarthroplasty on 7/19. Surgery complicated by wound dehiscence and left hip abscess, culture positive for MSSA status post I&D.   At that time, infection was determined to not involve the prosthetic

## 2023-11-08 NOTE — OP NOTE
[address] [facility] [facility]                                OPERATIVE REPORT    PATIENT NAME: Janeth Garrett                  :        1980  MED REC NO:   [id number]                             ROOM:  ACCOUNT NO:   [de-identified]                           ADMIT DATE: 2023  PROVIDER:     Princess Traore    DATE OF PROCEDURE:  2023    PREOPERATIVE DIAGNOSIS:  Left hip prosthetic joint infection. POSTOPERATIVE DIAGNOSIS:  Left hip prosthetic joint infection. OPERATIONS PERFORMED:  1. Removal of left hip prosthesis with placement of antibiotic spacer. 2.  Irrigation and excisional debridement of skin down to including the  bone of 10 x 20 cm wound, left hip. SURGEON:  Princess Traore DO    ASSISTANT:  Alanda Koyanagi, DO, PGY-3    ANESTHESIA:  General.    ESTIMATED BLOOD LOSS:  1000 mL. FLUIDS:  2500 mL of crystalloid. COMPLICATIONS:  None. SPECIMENS:  None. IMPLANTS:  DePuy Prostalac size 3 x 200 mm standard with a 28 mm with a  49 mm shell +5 head dual-mobility. FINDINGS:  Left hip prosthetic joint infections. INDICATIONS:  This is a 41-year-old female with extensive past surgical  history of her left hip. She initially underwent left hip  hemiarthroplasty from myself on 2023 without any complication. She has eventually developed wound complications with wound breakdown  requiring irrigation and debridement. She had a recent irrigation and  debridement, but had continued drainage from her hip. Discussed the  need for explantation as part of a staged procedure. The patient was  amenable to this. Consent was obtained and placed in chart. All  questions were answered appropriately.   Surgical risks including but not  limited to bleeding, blood clots, infection, damage to nearby tissues,  vessels and nerves, wound healing complications, failed procedure,  stiffness, loss of

## 2023-11-08 NOTE — CONSULTS
[address]     Department of Internal Medicine - Staff Internal Medicine Teaching Service          ADMISSION NOTE/HISTORY AND PHYSICAL EXAMINATION   Date: 11/8/2023  Patient Name: Fawn Garcia  Date of admission: 11/7/2023  7:26 AM  YOB: 1980  PCP: Ivett Puente  History Obtained From:  patient, electronic medical record    Consult Reason:     Consult Reason: Medical management    HISTORY OF PRESENTING ILLNESS     The patient is a pleasant 39 y.o. female with a past medical history of DM2 on insulin, antiphospholipid syndrome on fondaparinux, bipolar 1, recurrent falls secondary to alcohol use, Painting's esophagus presents for removal of left hip prosthesis with placement of antibiotic spacer and incision and drainage of chronic left hip infection by orthopedic surgery. Patient sustained a left femoral neck fracture secondary to fall while intoxicated for which she underwent left hemiarthroplasty on 7/19. Surgery complicated by wound dehiscence and left hip abscess, culture positive for MSSA status post I&D. At that time, infection was determined to not involve the prosthetic joint. Treated with Rocephin but due to continuous purulent drainage, antibiotic dosage was increased by ID. Despite dosage adjustment, patient continued to have drainage from left hip surgical site for which she underwent a second I&D with wound closure on 10/24. Patient still continued to report left hip pain associated with numbness and tingling of the left lower extremity and continuous serous drainage from surgical site. She is admitted today for removal of left hip prosthesis with placement of antibiotic spacer and I&D by orthopedics.       Review of Systems:  General ROS: Completed and except as mentioned above were negative   HEENT ROS: Completed and except as mentioned above were negative   Allergy and Immunology ROS:  Completed and except as mentioned above were

## 2023-11-08 NOTE — CARE COORDINATION
11/08/23 1255   Readmission Assessment   Number of Days since last admission? 8-30 days   Previous Disposition SNF   Who is being Interviewed Patient   What was the patient's/caregiver's perception as to why they think they needed to return back to the hospital? Other (Comment)  (symptoms)   Did you visit your Primary Care Physician after you left the hospital, before you returned this time? No   Why weren't you able to visit your PCP? Did not have an appointment   Did you see a specialist, such as Cardiac, Pulmonary, Orthopedic Physician, etc. after you left the hospital? No   Who advised the patient to return to the hospital? Self-referral   Does the patient report anything that got in the way of taking their medications? No   In our efforts to provide the best possible care to you and others like you, can you think of anything that we could have done to help you after you left the hospital the first time, so that you might not have needed to return so soon?  Arrange for more help when leaving the hospital

## 2023-11-08 NOTE — CARE COORDINATION
Case Management Assessment  Initial Evaluation    Date/Time of Evaluation: 11/8/2023 12:54 PM  Assessment Completed by: Jaycee Doan RN    If patient is discharged prior to next notation, then this note serves as note for discharge by case management. Patient Name: Fredis Finnegan                   YOB: 1980  Diagnosis: Surgical site infection [T81.49XA]  S/P total left hip arthroplasty [O80.486]                   Date / Time: 11/7/2023  7:26 AM    Patient Admission Status: Inpatient   Readmission Risk (Low < 19, Mod (19-27), High > 27): Readmission Risk Score: 39.3    Current PCP: [facility]  PCP verified by CM? Chart Reviewed: Yes      History Provided by: Patient  Patient Orientation: Alert and Oriented    Patient Cognition: Alert    Hospitalization in the last 30 days (Readmission):  Yes    If yes, Readmission Assessment in CM Navigator will be completed. Advance Directives:      Code Status: Full Code   Patient's Primary Decision Maker is:      Primary Decision Maker: Edie Ilda - [facility] - [phone]    Secondary Decision Maker: Aylin Mainela Brother/Sister - [phone]    Discharge Planning:    Patient lives with: Other (Comment) (ECF) Type of Home: Skilled Nursing Facility  Primary Care Giver: Self  Patient Support Systems include: Spouse/Significant Other   Current Financial resources:    Current community resources:    Current services prior to admission: Other (Comment) (SNF)            Current DME:              Type of Home Care services:  None    ADLS  Prior functional level:    Current functional level:      PT AM-PAC: 11 /24  OT AM-PAC: 17 /24    Family can provide assistance at DC: Would you like Case Management to discuss the discharge plan with any other family members/significant others, and if so, who?     Plans to Return to Present Housing:    Other Identified Issues/Barriers to RETURNING to current housing: none  Potential Assistance needed at

## 2023-11-08 NOTE — CONSULTS
[facility] - Initial Consult Note    Today's Date and Time: 11/8/2023, 2:44 PM    Impression :   S/p left hip cecilio arthroplasty 7-19-23  Left hip abscess/surgical wound dehiscence. Deep open wound - cx MSSA  Post initial I&D 8/21 then dressing change 8/24   Residual Left hip fluid collection s/p I&D with secondary closure 10/24/2023  Wound culture 10/24/2023 growing Staph aureus  MSSA  S/p removal of left hip prosthesis with placement of antibiotic spacer with irrigation and excisional debridement on 11/7/23    Antiphospholipid syndrome  Alcohol abuse with alcoholic hepatitis without ascites  Supraventricular tachycardia  Anxiety  Bipolar disorder    Recommendations:     Continue Rocephin 2 gm IV q 24 hr for a total of 4 weeks. Stop date 11-25-23  Rifampin 600 mg po daily for synergy against S aureus until 11/25/23  Wound culture 10/24/2023 Staph aureus- MSSA  Wound Care       Medical Decision Making/Summary/Discussion:11/8/2023     Patient with left hip cecilio arthroplasty 7-19-23  Subsequent Left hip abscess and surgical wound dehiscence on 8-21-23  Deep open wound - cx MSSA  Post initial I&D 8/21 then dressing change 8/24   S/P course of ceftriaxone as an outpatient  Persistent wound drainage leading to new I&D and wound closure on 10-24-23  Wound culture with S aureus  Ceftriaxone continued. Rifampin added for synergy given prior lack of response with Ceftriaxone alone.   Infection Control Recommendations   [place] Precautions    Antimicrobial Stewardship Recommendations     Simplification of therapy  Targeted therapy    Coordination of Outpatient Care:   Estimated Length of IV antimicrobials:11-25-23  Patient will need Midline Catheter Insertion: TBD  Patient will need PICC line Insertion:TBD  Patient will need: Home IV , [address] [place],  [facility],  LTAC: TBD  Patient will need outpatient wound care: Yes    Chief complaint/reason for consultation:   Hip infection      History

## 2023-11-08 NOTE — PROGRESS NOTES
Physical Therapy  Facility/Department: [address] ORTHO/MED SURG  Physical Therapy Initial Assessment    Name: Fawn Garcia  : 1980  MRN: [id number]  Date of Service: 2023    Discharge Recommendations:  Patient would benefit from continued therapy after discharge          Patient Diagnosis(es): There were no encounter diagnoses. 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 encounter, Constipation, Depression, Fibromyalgia, Genital herpes, unspecified, GERD (gastroesophageal reflux disease), H/O bariatric surgery, History of pulmonary embolism, Hx of blood clots, Hypertension, Lives in nursing home, Lumbosacral spondylosis without myelopathy, MDRO (multiple drug resistant organisms) resistance, Mobility impaired, MRSA (methicillin resistant staph aureus) culture positive, Muscle weakness, Obsessive-compulsive disorder, unspecified, Opioid abuse, in remission ([address]), Pernicious anemia, Polyneuropathy, unspecified, PTSD (post-traumatic stress disorder), Pulmonary embolism ([address]), Suicidal behavior, SVT (supraventricular tachycardia), Type 2 diabetes mellitus, with long-term current use of insulin ([address]), Under care of team, and Under care of team.  Past Surgical History:  has a past surgical history that includes Cholecystectomy; Liver Resection (); Tonsillectomy; Abdominal hernia repair (); Abdominal hernia repair (2014); Bariatric Surgery (2013); Dilation and curettage of uterus (); Finger amputation (Left, 2015); lymph node biopsy (); Total abdominal hysterectomy w/ bilateral salpingoophorectomy (); IVC filter insertion; hip surgery (Left); Ankle fracture surgery (Left);  cath power picc single (2023);  Ankle

## 2023-11-08 NOTE — PROGRESS NOTES
Occupational Therapy  Facility/Department: [address] ORTHO/MED SURG  Occupational Therapy Initial Assessment    Name: Julian Soriano  : 1980  MRN: [id number]  Date of Service: 2023    Discharge Recommendations:   Further therapy recommended at discharge. OT Equipment Recommendations  Equipment Needed: Yes  Mobility Devices: ADL Assistive Devices  ADL Assistive Devices: Sock-Aid Hard;Reacher;Long-handled Shoe Horn;Long-handled Sponge       Patient Diagnosis(es): There were no encounter diagnoses. 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 encounter, Constipation, Depression, Fibromyalgia, Genital herpes, unspecified, GERD (gastroesophageal reflux disease), H/O bariatric surgery, History of pulmonary embolism, Hx of blood clots, Hypertension, Lives in nursing home, Lumbosacral spondylosis without myelopathy, MDRO (multiple drug resistant organisms) resistance, Mobility impaired, MRSA (methicillin resistant staph aureus) culture positive, Muscle weakness, Obsessive-compulsive disorder, unspecified, Opioid abuse, in remission ([address]), Pernicious anemia, Polyneuropathy, unspecified, PTSD (post-traumatic stress disorder), Pulmonary embolism ([address]), Suicidal behavior, SVT (supraventricular tachycardia), Type 2 diabetes mellitus, with long-term current use of insulin ([address]), Under care of team, and Under care of team.  Past Surgical History:  has a past surgical history that includes Cholecystectomy; Liver Resection (); Tonsillectomy; Abdominal hernia repair (); Abdominal hernia repair (2014); Bariatric Surgery (2013); Dilation and curettage of uterus (); Finger amputation (Left, 2015); lymph node biopsy ();  Total abdominal complete  All ADLs completed during session discussed above, otherwise clinical reasoning/judgement utilized for all other assist levels.       Vision  Vision: Impaired  Vision Exceptions:  (contacts)  Hearing  Hearing: Within functional limits  Cognition  Overall Cognitive Status: WFL  Orientation  Overall Orientation Status: Within Functional Limits  Orientation Level: Oriented X4                  Education Given To: Patient  Education Provided: Role of Therapy;Plan of Care;Transfer Training  Education Provided Comments: pursed lip breathing  Education Method: Verbal  Barriers to Learning: None  Education Outcome: Verbalized understanding           Hand Dominance  Hand Dominance: Right                               AM-PAC Score        AM-PAC Inpatient Daily Activity Raw Score: 17 (11/08/23 1130)  AM-PAC Inpatient ADL T-Scale Score : 37.26 (11/08/23 1130)  ADL Inpatient CMS 0-100% Score: 50.11 (11/08/23 1130)  ADL Inpatient CMS G-Code Modifier : CK (11/08/23 1130)         Goals  Short Term Goals  Time Frame for Short Term Goals: By d/c, pt will:  Short Term Goal 1: demo bed mobility w/ SBA to increase independence and engagement in functional activites  Short Term Goal 2: demo functional transfers w/ use of LRAD and CGA to increase independence and engagement w/ daily activities  Short Term Goal 3: demo 2 minutes static standing during meaningful activity w/ use of LRAD and CGA  Short Term Goal 4: demo UB ADL's w/ SUP  Short Term Goal 5: demo LB ADL's and toileting w/ Mod Ax1 and use of DME prn  Short Term Goal 6: demo increased activity tolerance by participating  in 25+ minutes of meaningful activity  Short Term Goal 7: demo adherence to posterior hip precautions 100% of the time during functional mobility/ADL completion w/ no vc's        Therapy Time   Individual Concurrent Group Co-treatment   Time In 0929         Time Out 1001         Minutes 32      Co-eval w/ PT   Timed Code Treatment Minutes: 8 Minutes

## 2023-11-09 LAB
ANION GAP SERPL CALCULATED.3IONS-SCNC: 9 MMOL/L (ref 9–17)
BASOPHILS # BLD: 0.03 K/UL (ref 0–0.2)
BASOPHILS NFR BLD: 0 % (ref 0–2)
BUN SERPL-MCNC: 11 MG/DL (ref 6–20)
CALCIUM SERPL-MCNC: 7.9 MG/DL (ref 8.6–10.4)
CHLORIDE SERPL-SCNC: 106 MMOL/L (ref 98–107)
CO2 SERPL-SCNC: 23 MMOL/L (ref 20–31)
CREAT SERPL-MCNC: 0.4 MG/DL (ref 0.5–0.9)
EOSINOPHIL # BLD: 0.06 K/UL (ref 0–0.44)
EOSINOPHILS RELATIVE PERCENT: 1 % (ref 1–4)
ERYTHROCYTE [DISTWIDTH] IN BLOOD BY AUTOMATED COUNT: 14.7 % (ref 11.8–14.4)
EST. AVERAGE GLUCOSE BLD GHB EST-MCNC: 140 MG/DL
GFR SERPL CREATININE-BSD FRML MDRD: >60 ML/MIN/1.73M2
GLUCOSE BLD-MCNC: 128 MG/DL (ref 65–105)
GLUCOSE BLD-MCNC: 154 MG/DL (ref 65–105)
GLUCOSE BLD-MCNC: 175 MG/DL (ref 65–105)
GLUCOSE BLD-MCNC: 241 MG/DL (ref 65–105)
GLUCOSE SERPL-MCNC: 140 MG/DL (ref 70–99)
HBA1C MFR BLD: 6.5 % (ref 4–6)
HCT VFR BLD AUTO: 21 % (ref 36.3–47.1)
HCT VFR BLD AUTO: 24.3 % (ref 36.3–47.1)
HGB BLD-MCNC: 6.7 G/DL (ref 11.9–15.1)
HGB BLD-MCNC: 7.2 G/DL (ref 11.9–15.1)
IMM GRANULOCYTES # BLD AUTO: 0.06 K/UL (ref 0–0.3)
IMM GRANULOCYTES NFR BLD: 1 %
LYMPHOCYTES NFR BLD: 1.59 K/UL (ref 1.1–3.7)
LYMPHOCYTES RELATIVE PERCENT: 20 % (ref 24–43)
MCH RBC QN AUTO: 29.1 PG (ref 25.2–33.5)
MCHC RBC AUTO-ENTMCNC: 29.6 G/DL (ref 28.4–34.8)
MCV RBC AUTO: 98.4 FL (ref 82.6–102.9)
MONOCYTES NFR BLD: 0.52 K/UL (ref 0.1–1.2)
MONOCYTES NFR BLD: 6 % (ref 3–12)
NEUTROPHILS NFR BLD: 72 % (ref 36–65)
NEUTS SEG NFR BLD: 5.91 K/UL (ref 1.5–8.1)
NRBC BLD-RTO: 0 PER 100 WBC
PLATELET # BLD AUTO: 314 K/UL (ref 138–453)
PMV BLD AUTO: 10.9 FL (ref 8.1–13.5)
POTASSIUM SERPL-SCNC: 4.7 MMOL/L (ref 3.7–5.3)
RBC # BLD AUTO: 2.47 M/UL (ref 3.95–5.11)
RBC # BLD: ABNORMAL 10*6/UL
SODIUM SERPL-SCNC: 138 MMOL/L (ref 135–144)
WBC OTHER # BLD: 8.2 K/UL (ref 3.5–11.3)

## 2023-11-09 PROCEDURE — 80048 BASIC METABOLIC PNL TOTAL CA: CPT

## 2023-11-09 PROCEDURE — 6360000002 HC RX W HCPCS: Performed by: NURSE PRACTITIONER

## 2023-11-09 PROCEDURE — 2580000003 HC RX 258

## 2023-11-09 PROCEDURE — 6360000002 HC RX W HCPCS

## 2023-11-09 PROCEDURE — 97110 THERAPEUTIC EXERCISES: CPT

## 2023-11-09 PROCEDURE — 36415 COLL VENOUS BLD VENIPUNCTURE: CPT

## 2023-11-09 PROCEDURE — 85025 COMPLETE CBC W/AUTO DIFF WBC: CPT

## 2023-11-09 PROCEDURE — 82947 ASSAY GLUCOSE BLOOD QUANT: CPT

## 2023-11-09 PROCEDURE — 85018 HEMOGLOBIN: CPT

## 2023-11-09 PROCEDURE — 1200000000 HC SEMI PRIVATE

## 2023-11-09 PROCEDURE — 97530 THERAPEUTIC ACTIVITIES: CPT

## 2023-11-09 PROCEDURE — 6370000000 HC RX 637 (ALT 250 FOR IP)

## 2023-11-09 PROCEDURE — 99232 SBSQ HOSP IP/OBS MODERATE 35: CPT | Performed by: INTERNAL MEDICINE

## 2023-11-09 PROCEDURE — 6370000000 HC RX 637 (ALT 250 FOR IP): Performed by: STUDENT IN AN ORGANIZED HEALTH CARE EDUCATION/TRAINING PROGRAM

## 2023-11-09 PROCEDURE — 85014 HEMATOCRIT: CPT

## 2023-11-09 PROCEDURE — 6370000000 HC RX 637 (ALT 250 FOR IP): Performed by: NURSE PRACTITIONER

## 2023-11-09 RX ORDER — SODIUM CHLORIDE 9 MG/ML
INJECTION, SOLUTION INTRAVENOUS PRN
Status: DISCONTINUED | OUTPATIENT
Start: 2023-11-09 | End: 2023-11-15 | Stop reason: HOSPADM

## 2023-11-09 RX ADMIN — KETOROLAC TROMETHAMINE 30 MG: 30 INJECTION, SOLUTION INTRAMUSCULAR; INTRAVENOUS at 06:35

## 2023-11-09 RX ADMIN — OXYCODONE HYDROCHLORIDE 10 MG: 5 TABLET ORAL at 11:29

## 2023-11-09 RX ADMIN — FONDAPARINUX SODIUM 10 MG: 5 INJECTION, SOLUTION SUBCUTANEOUS at 12:59

## 2023-11-09 RX ADMIN — ALCOHOL 1 TABLET: 70.47 GEL TOPICAL at 10:08

## 2023-11-09 RX ADMIN — INSULIN GLARGINE 24 UNITS: 100 INJECTION, SOLUTION SUBCUTANEOUS at 10:10

## 2023-11-09 RX ADMIN — RIFAMPIN 600 MG: 300 CAPSULE ORAL at 10:09

## 2023-11-09 RX ADMIN — OXYCODONE HYDROCHLORIDE 10 MG: 5 TABLET ORAL at 03:56

## 2023-11-09 RX ADMIN — Medication 100 MG: at 10:08

## 2023-11-09 RX ADMIN — METOPROLOL TARTRATE 25 MG: 25 TABLET ORAL at 10:08

## 2023-11-09 RX ADMIN — GABAPENTIN 100 MG: 100 CAPSULE ORAL at 15:32

## 2023-11-09 RX ADMIN — BACLOFEN 20 MG: 10 TABLET ORAL at 20:41

## 2023-11-09 RX ADMIN — LORAZEPAM 0.5 MG: 0.5 TABLET ORAL at 00:19

## 2023-11-09 RX ADMIN — LORAZEPAM 0.5 MG: 0.5 TABLET ORAL at 23:42

## 2023-11-09 RX ADMIN — FOLIC ACID 1 MG: 1 TABLET ORAL at 10:08

## 2023-11-09 RX ADMIN — SODIUM CHLORIDE, PRESERVATIVE FREE 10 ML: 5 INJECTION INTRAVENOUS at 20:48

## 2023-11-09 RX ADMIN — CYCLOBENZAPRINE 10 MG: 10 TABLET, FILM COATED ORAL at 15:32

## 2023-11-09 RX ADMIN — LORAZEPAM 0.5 MG: 0.5 TABLET ORAL at 15:36

## 2023-11-09 RX ADMIN — BACLOFEN 20 MG: 10 TABLET ORAL at 07:28

## 2023-11-09 RX ADMIN — HYDROMORPHONE HYDROCHLORIDE 1 MG: 1 INJECTION, SOLUTION INTRAMUSCULAR; INTRAVENOUS; SUBCUTANEOUS at 16:41

## 2023-11-09 RX ADMIN — GABAPENTIN 100 MG: 100 CAPSULE ORAL at 00:20

## 2023-11-09 RX ADMIN — GABAPENTIN 100 MG: 100 CAPSULE ORAL at 10:09

## 2023-11-09 RX ADMIN — OXYCODONE HYDROCHLORIDE 10 MG: 5 TABLET ORAL at 19:31

## 2023-11-09 RX ADMIN — CYCLOBENZAPRINE 10 MG: 10 TABLET, FILM COATED ORAL at 10:09

## 2023-11-09 RX ADMIN — ACETAMINOPHEN 1000 MG: 500 TABLET ORAL at 12:59

## 2023-11-09 RX ADMIN — OXYCODONE HYDROCHLORIDE 10 MG: 5 TABLET ORAL at 23:42

## 2023-11-09 RX ADMIN — VALACYCLOVIR HYDROCHLORIDE 500 MG: 500 TABLET, FILM COATED ORAL at 10:08

## 2023-11-09 RX ADMIN — OXYCODONE HYDROCHLORIDE 10 MG: 5 TABLET ORAL at 00:19

## 2023-11-09 RX ADMIN — KETOROLAC TROMETHAMINE 30 MG: 30 INJECTION, SOLUTION INTRAMUSCULAR; INTRAVENOUS at 00:20

## 2023-11-09 RX ADMIN — CYCLOBENZAPRINE 10 MG: 10 TABLET, FILM COATED ORAL at 03:56

## 2023-11-09 RX ADMIN — HYDROMORPHONE HYDROCHLORIDE 1 MG: 1 INJECTION, SOLUTION INTRAMUSCULAR; INTRAVENOUS; SUBCUTANEOUS at 12:51

## 2023-11-09 RX ADMIN — KETOROLAC TROMETHAMINE 30 MG: 30 INJECTION, SOLUTION INTRAMUSCULAR; INTRAVENOUS at 19:32

## 2023-11-09 RX ADMIN — PRAVASTATIN SODIUM 40 MG: 20 TABLET ORAL at 20:42

## 2023-11-09 RX ADMIN — LORAZEPAM 0.5 MG: 0.5 TABLET ORAL at 07:24

## 2023-11-09 RX ADMIN — INSULIN GLARGINE 10 UNITS: 100 INJECTION, SOLUTION SUBCUTANEOUS at 20:54

## 2023-11-09 RX ADMIN — OXYCODONE HYDROCHLORIDE 10 MG: 5 TABLET ORAL at 15:32

## 2023-11-09 RX ADMIN — ACETAMINOPHEN 1000 MG: 500 TABLET ORAL at 06:36

## 2023-11-09 RX ADMIN — FLUOXETINE HYDROCHLORIDE 40 MG: 20 CAPSULE ORAL at 10:09

## 2023-11-09 RX ADMIN — ACETAMINOPHEN 1000 MG: 500 TABLET ORAL at 19:31

## 2023-11-09 RX ADMIN — LOSARTAN POTASSIUM 50 MG: 50 TABLET, FILM COATED ORAL at 10:14

## 2023-11-09 RX ADMIN — HYDROMORPHONE HYDROCHLORIDE 1 MG: 1 INJECTION, SOLUTION INTRAMUSCULAR; INTRAVENOUS; SUBCUTANEOUS at 20:41

## 2023-11-09 RX ADMIN — KETOROLAC TROMETHAMINE 30 MG: 30 INJECTION, SOLUTION INTRAMUSCULAR; INTRAVENOUS at 12:59

## 2023-11-09 RX ADMIN — BUPROPION HYDROCHLORIDE 150 MG: 150 TABLET, EXTENDED RELEASE ORAL at 10:08

## 2023-11-09 RX ADMIN — OXYCODONE HYDROCHLORIDE 10 MG: 5 TABLET ORAL at 07:24

## 2023-11-09 RX ADMIN — GABAPENTIN 100 MG: 100 CAPSULE ORAL at 20:42

## 2023-11-09 RX ADMIN — Medication 2000 MG: at 15:40

## 2023-11-09 RX ADMIN — CYCLOBENZAPRINE 10 MG: 10 TABLET, FILM COATED ORAL at 23:06

## 2023-11-09 RX ADMIN — IRON SUCROSE 200 MG: 20 INJECTION, SOLUTION INTRAVENOUS at 12:55

## 2023-11-09 RX ADMIN — DOCUSATE SODIUM 100 MG: 100 CAPSULE, LIQUID FILLED ORAL at 20:42

## 2023-11-09 ASSESSMENT — PAIN DESCRIPTION - DESCRIPTORS
DESCRIPTORS: DISCOMFORT

## 2023-11-09 ASSESSMENT — PAIN DESCRIPTION - LOCATION
LOCATION: HIP

## 2023-11-09 ASSESSMENT — PAIN SCALES - GENERAL
PAINLEVEL_OUTOF10: 7
PAINLEVEL_OUTOF10: 10
PAINLEVEL_OUTOF10: 8
PAINLEVEL_OUTOF10: 8
PAINLEVEL_OUTOF10: 10
PAINLEVEL_OUTOF10: 8
PAINLEVEL_OUTOF10: 10
PAINLEVEL_OUTOF10: 6
PAINLEVEL_OUTOF10: 7
PAINLEVEL_OUTOF10: 9
PAINLEVEL_OUTOF10: 8
PAINLEVEL_OUTOF10: 7
PAINLEVEL_OUTOF10: 10

## 2023-11-09 ASSESSMENT — PAIN DESCRIPTION - ORIENTATION
ORIENTATION: LEFT

## 2023-11-09 NOTE — PROGRESS NOTES
Physical Therapy  Facility/Department: [address] ORTHO/MED SURG  Physical Therapy Treatment Note    Name: Larence Sandifer  : 1980  MRN: [id number]  Date of Service: 2023    Discharge Recommendations:  Patient would benefit from continued therapy after discharge   PT Equipment Recommendations  Equipment Needed: No      Patient Diagnosis(es): There were no encounter diagnoses. 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 encounter, Constipation, Depression, Fibromyalgia, Genital herpes, unspecified, GERD (gastroesophageal reflux disease), H/O bariatric surgery, History of pulmonary embolism, Hx of blood clots, Hypertension, Lives in nursing home, Lumbosacral spondylosis without myelopathy, MDRO (multiple drug resistant organisms) resistance, Mobility impaired, MRSA (methicillin resistant staph aureus) culture positive, Muscle weakness, Obsessive-compulsive disorder, unspecified, Opioid abuse, in remission ([address]), Pernicious anemia, Polyneuropathy, unspecified, PTSD (post-traumatic stress disorder), Pulmonary embolism ([address]), Suicidal behavior, SVT (supraventricular tachycardia), Type 2 diabetes mellitus, with long-term current use of insulin ([address]), Under care of team, and Under care of team.  Past Surgical History:  has a past surgical history that includes Cholecystectomy; Liver Resection (); Tonsillectomy; Abdominal hernia repair (); Abdominal hernia repair (2014); Bariatric Surgery (2013); Dilation and curettage of uterus (); Finger amputation (Left, 2015); lymph node biopsy (); Total abdominal hysterectomy w/ bilateral salpingoophorectomy (); IVC filter insertion; hip surgery (Left);  Ankle fracture surgery (Left); hc (11/09/23 1709)    Goals  Short Term Goals  Time Frame for Short Term Goals: 10 visits  Short Term Goal 1: transfers with CGA  Short Term Goal 2: amb 25 ft with a RW x CGA with WBAT L LE  Short Term Goal 3: ascend/descend 4 steps with min assist  Short Term Goal 4: 20 min strengthening exercise program x SBA     Education  Patient Education  Education Given To: Patient  Education Provided: Role of Therapy;Plan of Care;Home Exercise Program;Transfer Training; Fall Prevention Strategies; Energy Conservation  Education Method: Demonstration;Verbal  Barriers to Learning: None  Education Outcome: Verbalized understanding;Demonstrated understanding;Continued education needed      Therapy Time   Individual Concurrent Group Co-treatment   Time In 1448         Time Out 1528         Minutes 40         Timed Code Treatment Minutes: [address] [place], [place]

## 2023-11-09 NOTE — PROGRESS NOTES
[facility]  Internal Medicine Teaching Residency Program  Inpatient Daily Progress Note  ______________________________________________________________________________    Patient: Brad Moralez  YOB: 1980   [facility]:[id number]    Acct: [de-identified]     Room: [facility]  Admit date: 11/7/2023  Today's date: 11/09/23  Number of days in the hospital: 2    SUBJECTIVE   Admitting Diagnosis: S/P total left hip arthroplasty  CC: Left hip pain  Pt examined at bedside. Chart & results reviewed. Afebrile. VSS on room air. Patient crying, reporting severe left hip pain POD #2 s/p antibiotic spacer and I&D, started on IV dilaudad 1mg q4hrs PRN. Wound VAC in place, 200 mL output. UOP 0.7/hr, vivas catheter in place. Hb 7.2, on IV venofer. ROS:  Constitutional:  negative for chills, fevers, sweats  Respiratory:  negative for cough, dyspnea on exertion, hemoptysis, shortness of breath, wheezing  Cardiovascular:  negative for chest pain, chest pressure/discomfort, lower extremity edema, palpitations  Gastrointestinal:  negative for abdominal pain, constipation, diarrhea, nausea, vomiting  Neurological:  negative for dizziness, headache  BRIEF HISTORY     The patient is a pleasant 39 y.o. female with a past medical history of DM2 on insulin, antiphospholipid syndrome on fondaparinux, bipolar 1, recurrent falls secondary to alcohol use, Painting's esophagus presents for removal of left hip prosthesis with placement of antibiotic spacer and incision and drainage of chronic left hip infection by orthopedic surgery. Patient sustained a left femoral neck fracture secondary to fall while intoxicated for which she underwent left hemiarthroplasty on 7/19. Surgery complicated by wound dehiscence and left hip abscess, culture positive for MSSA status post I&D. At that time, infection was determined to not involve the prosthetic joint.   Treated with Rocephin but due to rifAMPin  600 mg Oral Daily    gabapentin  100 mg Oral TID    docusate sodium  100 mg Oral BID    baclofen  20 mg Oral BID    buprenorphine  1 patch TransDERmal Weekly    buPROPion  150 mg Oral QAM    FLUoxetine  40 mg Oral Daily    folic acid  1 mg Oral Daily    insulin glargine  10 Units SubCUTAneous Nightly    losartan  50 mg Oral Daily    metoprolol tartrate  25 mg Oral Daily    pravastatin  40 mg Oral Nightly    valACYclovir  500 mg Oral Daily    fondaparinux  10 mg SubCUTAneous Daily    sodium chloride flush  5-40 mL IntraVENous 2 times per day    cyclobenzaprine  10 mg Oral Q6H    acetaminophen  1,000 mg Oral 4 times per day     Continuous Infusions:    dextrose      sodium chloride       PRN MedicationsHYDROmorphone, 1 mg, Q4H PRN  oxyCODONE, 5 mg, Q4H PRN   Or  oxyCODONE, 10 mg, Q4H PRN  LORazepam, 0.5 mg, Q8H PRN  midodrine, 2.5 mg, BID PRN  pantoprazole, 40 mg, Daily PRN  glucose, 4 tablet, PRN  dextrose bolus, 125 mL, PRN   Or  dextrose bolus, 250 mL, PRN  glucagon (rDNA), 1 mg, PRN  dextrose, , Continuous PRN  sodium chloride flush, 5-40 mL, PRN  sodium chloride, , PRN  ondansetron, 4 mg, Q8H PRN   Or  ondansetron, 4 mg, Q6H PRN  polyethylene glycol, 17 g, Daily PRN  melatonin, 6 mg, Nightly PRN  polyvinyl alcohol, 1 drop, PRN  oxymetazoline, 2 spray, BID PRN  calcium carbonate, 500 mg, TID PRN  benzonatate, 100 mg, TID PRN  benzocaine-menthol, 1 lozenge, Q2H PRN        Diagnostic Labs:  CBC:   Recent Labs     11/07/23 1758 11/08/23 0513 11/09/23  0702   WBC 15.5* 10.8 8.2   RBC 3.78* 2.74* 2.47*   HGB 10.8* 7.7* 7.2*   HCT 38.2 26.2* 24.3*   .1 95.6 98.4   RDW 14.2 14.2 14.7*    330 314     BMP:   Recent Labs     11/07/23 1758 11/08/23  0513 11/09/23  0702    133* 138   K 4.7 5.1 4.7    100 106   CO2 18* 21 23   BUN 9 14 11   CREATININE 0.5 0.7 0.4*     BNP: No results for input(s): \"BNP\" in the last 72 hours.   PT/INR: No results for input(s): \"PROTIME\", \"INR\" in the last

## 2023-11-09 NOTE — PROGRESS NOTES
Orthopedic Progress Note    Patient:  Kristen Hope  YOB: 1980     39 y.o. female    Subjective:  Patient seen and examined this morning. States that her left hip is painful, especially while attempting to work with physical therapy. No issues overnight per nursing. Denies fever, HA, CP, SOB, N/V, dysuria, new numbness/tingling. Was able to sit to stand with PT yesterday x2 with minimal assistance, but was limited by hip pain for ambulation, per documentation. She will continue to work with PT/OT. Vitals reviewed, afebrile    Objective:   Vitals:    11/09/23 0049   BP:    Pulse:    Resp: 16   Temp:    SpO2:      Gen: NAD, cooperative    Cardiovascular: Tachycardic rate   Respiratory: No acute respiratory distress, breathing comfortably    Orthopedic Exam  LLE:    Dressings are dry, and intact with mild amount of strike-through at the middle aspect of the proximal optifoam. Distal island dressing clean, dry, and intact without saturation. Proximal and distal drains in place with 35 and 45 cc output overnight, respectively. Output is serosanguinous in nature. Tender to palpation about left hip and surgical incisions. Compartments soft and compressible. FHL/TA/GSC motor complexes intact. Weak activation of EHL. Saphenous/Sural/Tibial/SPN/DPN SILT. DP 2+. LLE is WWP with BCR. Recent Labs     11/08/23  0513   WBC 10.8   HGB 7.7*   HCT 26.2*      *   K 5.1   BUN 14   CREATININE 0.7   GLUCOSE 475*      Meds:   Abx: Ceftriaxone, rifampin, per ID. DVT ppx: Fondaparinux  See rec for complete list    Impression 39 y.o. female being seen for:    -Left hip prosthetic joint infection, s/p revision left hip hemiarthroplasty/I&D, DOS 11/7/23. Plan  - No further plans for orthopedic intervention at this time  - Abx: Ceftriaxone, Rifampin, per ID.  - Hemovac's in place about left hip. 45 cc out from distal drain this morning, 35 out from proximal drain.  Please measure and record

## 2023-11-10 LAB
ANION GAP SERPL CALCULATED.3IONS-SCNC: 8 MMOL/L (ref 9–17)
BASOPHILS # BLD: 0.03 K/UL (ref 0–0.2)
BASOPHILS NFR BLD: 0 % (ref 0–2)
BUN SERPL-MCNC: 11 MG/DL (ref 6–20)
CALCIUM SERPL-MCNC: 7.5 MG/DL (ref 8.6–10.4)
CHLORIDE SERPL-SCNC: 105 MMOL/L (ref 98–107)
CO2 SERPL-SCNC: 24 MMOL/L (ref 20–31)
CREAT SERPL-MCNC: 0.5 MG/DL (ref 0.5–0.9)
CRP SERPL HS-MCNC: 134.2 MG/L (ref 0–5)
EOSINOPHIL # BLD: 0.2 K/UL (ref 0–0.44)
EOSINOPHILS RELATIVE PERCENT: 3 % (ref 1–4)
ERYTHROCYTE [DISTWIDTH] IN BLOOD BY AUTOMATED COUNT: 16 % (ref 11.8–14.4)
GFR SERPL CREATININE-BSD FRML MDRD: >60 ML/MIN/1.73M2
GLUCOSE BLD-MCNC: 120 MG/DL (ref 65–105)
GLUCOSE BLD-MCNC: 153 MG/DL (ref 65–105)
GLUCOSE BLD-MCNC: 87 MG/DL (ref 65–105)
GLUCOSE BLD-MCNC: 94 MG/DL (ref 65–105)
GLUCOSE SERPL-MCNC: 119 MG/DL (ref 70–99)
HCT VFR BLD AUTO: 24.2 % (ref 36.3–47.1)
HCT VFR BLD AUTO: 24.9 % (ref 36.3–47.1)
HCT VFR BLD AUTO: 25 % (ref 36.3–47.1)
HGB BLD-MCNC: 7.5 G/DL (ref 11.9–15.1)
HGB BLD-MCNC: 7.6 G/DL (ref 11.9–15.1)
HGB BLD-MCNC: 7.8 G/DL (ref 11.9–15.1)
IMM GRANULOCYTES # BLD AUTO: 0.03 K/UL (ref 0–0.3)
IMM GRANULOCYTES NFR BLD: 0 %
LYMPHOCYTES NFR BLD: 1.84 K/UL (ref 1.1–3.7)
LYMPHOCYTES RELATIVE PERCENT: 25 % (ref 24–43)
MCH RBC QN AUTO: 27.8 PG (ref 25.2–33.5)
MCHC RBC AUTO-ENTMCNC: 31.4 G/DL (ref 28.4–34.8)
MCV RBC AUTO: 88.6 FL (ref 82.6–102.9)
MONOCYTES NFR BLD: 0.71 K/UL (ref 0.1–1.2)
MONOCYTES NFR BLD: 10 % (ref 3–12)
NEUTROPHILS NFR BLD: 61 % (ref 36–65)
NEUTS SEG NFR BLD: 4.45 K/UL (ref 1.5–8.1)
NRBC BLD-RTO: 0 PER 100 WBC
PLATELET # BLD AUTO: ABNORMAL K/UL (ref 138–453)
PLATELET, FLUORESCENCE: ABNORMAL K/UL (ref 138–453)
POTASSIUM SERPL-SCNC: 4.4 MMOL/L (ref 3.7–5.3)
RBC # BLD AUTO: 2.73 M/UL (ref 3.95–5.11)
RBC # BLD: ABNORMAL 10*6/UL
SODIUM SERPL-SCNC: 137 MMOL/L (ref 135–144)
WBC OTHER # BLD: 7.3 K/UL (ref 3.5–11.3)

## 2023-11-10 PROCEDURE — 85014 HEMATOCRIT: CPT

## 2023-11-10 PROCEDURE — 2580000003 HC RX 258

## 2023-11-10 PROCEDURE — 6370000000 HC RX 637 (ALT 250 FOR IP): Performed by: NURSE PRACTITIONER

## 2023-11-10 PROCEDURE — 36430 TRANSFUSION BLD/BLD COMPNT: CPT

## 2023-11-10 PROCEDURE — 1200000000 HC SEMI PRIVATE

## 2023-11-10 PROCEDURE — 85018 HEMOGLOBIN: CPT

## 2023-11-10 PROCEDURE — 85025 COMPLETE CBC W/AUTO DIFF WBC: CPT

## 2023-11-10 PROCEDURE — 6360000002 HC RX W HCPCS

## 2023-11-10 PROCEDURE — 86140 C-REACTIVE PROTEIN: CPT

## 2023-11-10 PROCEDURE — 6360000002 HC RX W HCPCS: Performed by: NURSE PRACTITIONER

## 2023-11-10 PROCEDURE — 36415 COLL VENOUS BLD VENIPUNCTURE: CPT

## 2023-11-10 PROCEDURE — P9016 RBC LEUKOCYTES REDUCED: HCPCS

## 2023-11-10 PROCEDURE — 82947 ASSAY GLUCOSE BLOOD QUANT: CPT

## 2023-11-10 PROCEDURE — 97530 THERAPEUTIC ACTIVITIES: CPT

## 2023-11-10 PROCEDURE — 6370000000 HC RX 637 (ALT 250 FOR IP)

## 2023-11-10 PROCEDURE — 99232 SBSQ HOSP IP/OBS MODERATE 35: CPT | Performed by: INTERNAL MEDICINE

## 2023-11-10 PROCEDURE — 85055 RETICULATED PLATELET ASSAY: CPT

## 2023-11-10 PROCEDURE — 80048 BASIC METABOLIC PNL TOTAL CA: CPT

## 2023-11-10 PROCEDURE — 30233N1 TRANSFUSION OF NONAUTOLOGOUS RED BLOOD CELLS INTO PERIPHERAL VEIN, PERCUTANEOUS APPROACH: ICD-10-PCS | Performed by: STUDENT IN AN ORGANIZED HEALTH CARE EDUCATION/TRAINING PROGRAM

## 2023-11-10 PROCEDURE — 6370000000 HC RX 637 (ALT 250 FOR IP): Performed by: STUDENT IN AN ORGANIZED HEALTH CARE EDUCATION/TRAINING PROGRAM

## 2023-11-10 RX ORDER — KETOROLAC TROMETHAMINE 30 MG/ML
30 INJECTION, SOLUTION INTRAMUSCULAR; INTRAVENOUS ONCE
Status: COMPLETED | OUTPATIENT
Start: 2023-11-10 | End: 2023-11-10

## 2023-11-10 RX ADMIN — FONDAPARINUX SODIUM 10 MG: 5 INJECTION, SOLUTION SUBCUTANEOUS at 09:36

## 2023-11-10 RX ADMIN — FOLIC ACID 1 MG: 1 TABLET ORAL at 08:22

## 2023-11-10 RX ADMIN — CYCLOBENZAPRINE 10 MG: 10 TABLET, FILM COATED ORAL at 10:30

## 2023-11-10 RX ADMIN — METOPROLOL TARTRATE 25 MG: 25 TABLET ORAL at 08:22

## 2023-11-10 RX ADMIN — LORAZEPAM 0.5 MG: 0.5 TABLET ORAL at 20:54

## 2023-11-10 RX ADMIN — HYDROMORPHONE HYDROCHLORIDE 1 MG: 1 INJECTION, SOLUTION INTRAMUSCULAR; INTRAVENOUS; SUBCUTANEOUS at 04:54

## 2023-11-10 RX ADMIN — GABAPENTIN 100 MG: 100 CAPSULE ORAL at 14:17

## 2023-11-10 RX ADMIN — IRON SUCROSE 200 MG: 20 INJECTION, SOLUTION INTRAVENOUS at 12:28

## 2023-11-10 RX ADMIN — OXYCODONE HYDROCHLORIDE 10 MG: 5 TABLET ORAL at 16:29

## 2023-11-10 RX ADMIN — BACLOFEN 20 MG: 10 TABLET ORAL at 08:22

## 2023-11-10 RX ADMIN — Medication 100 MG: at 08:22

## 2023-11-10 RX ADMIN — LORAZEPAM 0.5 MG: 0.5 TABLET ORAL at 12:30

## 2023-11-10 RX ADMIN — OXYCODONE HYDROCHLORIDE 10 MG: 5 TABLET ORAL at 20:54

## 2023-11-10 RX ADMIN — CYCLOBENZAPRINE 10 MG: 10 TABLET, FILM COATED ORAL at 16:30

## 2023-11-10 RX ADMIN — ACETAMINOPHEN 1000 MG: 500 TABLET ORAL at 06:54

## 2023-11-10 RX ADMIN — LOSARTAN POTASSIUM 50 MG: 50 TABLET, FILM COATED ORAL at 08:22

## 2023-11-10 RX ADMIN — PRAVASTATIN SODIUM 40 MG: 20 TABLET ORAL at 20:53

## 2023-11-10 RX ADMIN — HYDROMORPHONE HYDROCHLORIDE 1 MG: 1 INJECTION, SOLUTION INTRAMUSCULAR; INTRAVENOUS; SUBCUTANEOUS at 00:50

## 2023-11-10 RX ADMIN — ACETAMINOPHEN 1000 MG: 500 TABLET ORAL at 00:50

## 2023-11-10 RX ADMIN — SODIUM CHLORIDE, PRESERVATIVE FREE 10 ML: 5 INJECTION INTRAVENOUS at 08:25

## 2023-11-10 RX ADMIN — VALACYCLOVIR HYDROCHLORIDE 500 MG: 500 TABLET, FILM COATED ORAL at 08:21

## 2023-11-10 RX ADMIN — OXYCODONE HYDROCHLORIDE 10 MG: 5 TABLET ORAL at 12:22

## 2023-11-10 RX ADMIN — KETOROLAC TROMETHAMINE 30 MG: 30 INJECTION, SOLUTION INTRAMUSCULAR; INTRAVENOUS at 06:54

## 2023-11-10 RX ADMIN — RIFAMPIN 600 MG: 300 CAPSULE ORAL at 08:22

## 2023-11-10 RX ADMIN — HYDROMORPHONE HYDROCHLORIDE 1 MG: 1 INJECTION, SOLUTION INTRAMUSCULAR; INTRAVENOUS; SUBCUTANEOUS at 14:17

## 2023-11-10 RX ADMIN — INSULIN GLARGINE 10 UNITS: 100 INJECTION, SOLUTION SUBCUTANEOUS at 22:29

## 2023-11-10 RX ADMIN — FLUOXETINE HYDROCHLORIDE 40 MG: 20 CAPSULE ORAL at 08:22

## 2023-11-10 RX ADMIN — GABAPENTIN 100 MG: 100 CAPSULE ORAL at 08:22

## 2023-11-10 RX ADMIN — ACETAMINOPHEN 1000 MG: 500 TABLET ORAL at 14:17

## 2023-11-10 RX ADMIN — Medication 2000 MG: at 15:23

## 2023-11-10 RX ADMIN — OXYCODONE HYDROCHLORIDE 10 MG: 5 TABLET ORAL at 03:49

## 2023-11-10 RX ADMIN — BUPROPION HYDROCHLORIDE 150 MG: 150 TABLET, EXTENDED RELEASE ORAL at 08:22

## 2023-11-10 RX ADMIN — ACETAMINOPHEN 1000 MG: 500 TABLET ORAL at 20:53

## 2023-11-10 RX ADMIN — GABAPENTIN 100 MG: 100 CAPSULE ORAL at 20:54

## 2023-11-10 RX ADMIN — CYCLOBENZAPRINE 10 MG: 10 TABLET, FILM COATED ORAL at 04:35

## 2023-11-10 RX ADMIN — CYCLOBENZAPRINE 10 MG: 10 TABLET, FILM COATED ORAL at 22:30

## 2023-11-10 RX ADMIN — HYDROMORPHONE HYDROCHLORIDE 1 MG: 1 INJECTION, SOLUTION INTRAMUSCULAR; INTRAVENOUS; SUBCUTANEOUS at 09:35

## 2023-11-10 RX ADMIN — OXYCODONE HYDROCHLORIDE 10 MG: 5 TABLET ORAL at 08:21

## 2023-11-10 RX ADMIN — Medication 6 MG: at 20:54

## 2023-11-10 RX ADMIN — ALCOHOL 1 TABLET: 70.47 GEL TOPICAL at 08:22

## 2023-11-10 RX ADMIN — BACLOFEN 20 MG: 10 TABLET ORAL at 20:54

## 2023-11-10 RX ADMIN — HYDROMORPHONE HYDROCHLORIDE 0.5 MG: 1 INJECTION, SOLUTION INTRAMUSCULAR; INTRAVENOUS; SUBCUTANEOUS at 23:44

## 2023-11-10 RX ADMIN — KETOROLAC TROMETHAMINE 30 MG: 30 INJECTION, SOLUTION INTRAMUSCULAR; INTRAVENOUS at 16:29

## 2023-11-10 RX ADMIN — KETOROLAC TROMETHAMINE 30 MG: 30 INJECTION, SOLUTION INTRAMUSCULAR; INTRAVENOUS at 01:04

## 2023-11-10 ASSESSMENT — PAIN DESCRIPTION - LOCATION
LOCATION: HIP

## 2023-11-10 ASSESSMENT — PAIN DESCRIPTION - DESCRIPTORS
DESCRIPTORS: ACHING;NAGGING
DESCRIPTORS: ACHING;SORE
DESCRIPTORS: ACHING;THROBBING
DESCRIPTORS: ACHING;SORE
DESCRIPTORS: ACHING;SORE;SPASM
DESCRIPTORS: SORE;SPASM;SHARP
DESCRIPTORS: ACHING;BURNING;SHARP

## 2023-11-10 ASSESSMENT — PAIN SCALES - GENERAL
PAINLEVEL_OUTOF10: 8
PAINLEVEL_OUTOF10: 7
PAINLEVEL_OUTOF10: 9
PAINLEVEL_OUTOF10: 4
PAINLEVEL_OUTOF10: 7
PAINLEVEL_OUTOF10: 8
PAINLEVEL_OUTOF10: 7
PAINLEVEL_OUTOF10: 8
PAINLEVEL_OUTOF10: 8
PAINLEVEL_OUTOF10: 5
PAINLEVEL_OUTOF10: 8

## 2023-11-10 ASSESSMENT — PAIN DESCRIPTION - ORIENTATION
ORIENTATION: LEFT

## 2023-11-10 NOTE — PROGRESS NOTES
cath power picc single (2023); Ankle fracture surgery (Left, 10/24/2023); Esophagogastroduodenoscopy (2021); Esophagogastroduodenoscopy (2013); laparoscopy (2013); Ankle surgery (Left, 2019);  section; hip surgery (Left, 2023); and hip surgery (Left, 2023). Assessment   Body Structures, Functions, Activity Limitations Requiring Skilled Therapeutic Intervention: Decreased functional mobility ; Decreased strength;Decreased endurance  Assessment: Pt required maxA for bed mobility, crying in pain and unable to attempt functional transfers this date d/t 10/10 R hip pain.  Pt not safe to return to prior living arrangement, will require intensive PT to regain functional independence  Therapy Prognosis: Good  Requires PT Follow-Up: Yes  Activity Tolerance  Activity Tolerance: Patient limited by pain     Plan   Physical Therapy Plan  General Plan:  (5-6x/wk)  Current Treatment Recommendations: Strengthening, Functional mobility training, Endurance training, Transfer training, Gait training, Stair training, Pain management, Safety education & training, Therapeutic activities  Safety Devices  Type of Devices: Call light within reach, Gait belt, Patient at risk for falls, Left in bed, Nurse notified, All fall risk precautions in place, Heels elevated for pressure relief  Restraints  Restraints Initially in Place: No     Restrictions  Restrictions/Precautions  Restrictions/Precautions: Weight Bearing  Required Braces or Orthoses?: No  Lower Extremity Weight Bearing Restrictions  Left Lower Extremity Weight Bearing: Weight Bearing As Tolerated  Position Activity Restriction  Hip Precautions: Posterior hip precautions, No ABduction  Other position/activity restrictions: S/p DEPUY SPACER HIP EXPLANT, REPLACE DEPUY SPACER HIP      Subjective   Pain: pt reports L hip pain as 8/10 prior to activity, 10/10 pain with mobility  General  Chart Reviewed: No  Patient assessed for Term Goal 3: ascend/descend 4 steps with min assist  Short Term Goal 4: 20 min strengthening exercise program x SBA       Education  Patient Education  Education Given To: Patient  Education Provided: Role of Therapy;Plan of Care;Energy Conservation  Education Method: Demonstration;Verbal  Barriers to Learning: None  Education Outcome: Verbalized understanding;Demonstrated understanding;Continued education needed      Therapy Time   Individual Concurrent Group Co-treatment   Time In 1435         Time Out 1500         Minutes 25         Timed Code Treatment Minutes: [address], PTA

## 2023-11-10 NOTE — CONSENT
Informed Consent for Blood Component Transfusion Note    I have discussed with the patient the rationale for blood component transfusion; its benefits in treating or preventing fatigue, organ damage, or death; and its risk which includes mild transfusion reactions, rare risk of blood borne infection, or more serious but rare reactions. I have discussed the alternatives to transfusion, including the risk and consequences of not receiving transfusion. The patient had an opportunity to ask questions and had agreed to proceed with transfusion of blood components.     Electronically signed by Joann Piedra MD on 11/9/23 at 10:58 PM EST

## 2023-11-10 NOTE — PROGRESS NOTES
[facility]  Internal Medicine Teaching Residency Program  Inpatient Daily Progress Note  ______________________________________________________________________________    Patient: Chayito Stratton  YOB: 1980   SP    Acct: [de-identified]     Room: [address] [place] [address]  Admit date: 2023  Today's date: 11/10/23  Number of days in the hospital: 3    SUBJECTIVE   Admitting Diagnosis: S/P total left hip arthroplasty  CC: Left hip pain  Pt examined at bedside. Chart & results reviewed. Afebrile. Tachycardic, -104, likely secondary to pain and anemia. Patient reports lightheadedness, likely secondary to anemia. Hemoglobin 6.7, transfused with 1 unit pRBC, repeat Hb 7.6. Wound VAC in place 190cc output. Patient proximal drain removed this morning. Patient reports significant improvement in pain today. ROS:  Constitutional:  negative for chills, fevers, sweats  Respiratory:  negative for cough, dyspnea on exertion, hemoptysis, shortness of breath, wheezing  Cardiovascular:  negative for chest pain, chest pressure/discomfort, lower extremity edema, palpitations  Gastrointestinal:  negative for abdominal pain, constipation, diarrhea, nausea, vomiting  Neurological:  negative for dizziness, headache, lightheaded  BRIEF HISTORY     The patient is a pleasant 39 y.o. female with a past medical history of DM2 on insulin, antiphospholipid syndrome on fondaparinux, bipolar 1, recurrent falls secondary to alcohol use, Painting's esophagus presents for removal of left hip prosthesis with placement of antibiotic spacer and incision and drainage of chronic left hip infection by orthopedic surgery. Patient sustained a left femoral neck fracture secondary to fall while intoxicated for which she underwent left hemiarthroplasty on . Surgery complicated by wound dehiscence and left hip abscess, culture positive for MSSA status post I&D.   At that time, -Follows with infectious disease outpatient.      -Infectious disease consulted    --10/24 wound cx growing MSSA. Started on rocephin today. End date 11/25/2023 per ID   -Incentive spirometry 10 times every hour while awake    -Wound VAC in place. Monitor output              - Pain regimen: roxicodone 10 q4h, baclofen 20 BID, gabapentin 100 TID Flexeril 10 q6h, Buprenex 5 patch weekly (patient supplied)   -Dilaudid DC'd today. Anemia   - Likely secondary to blood loss from surgery and continue drainage from wound VAC.   - On IV Venofer   -11/10 Hb 6.7, transfused with 1 unit PRBC. Repeat H&H 7.6.   -Repeat H&H every 12 hours x2    DM2              -HbA1c 7.7              -home dose Lantus 10 units nightly and 24 units morning resume. HDSS added   -Monitor blood glucose              -ASCVD 10.7 on pravastatin 40     Antiphospholipid syndrome              -With history of PE, DVT, and miscarriage              -Bilateral lower lobe PE in 3/2022              -On fondaparinux. Resumed 11/8 as patient high risk for clots given hypercoagulable disorder and recent surgery    Chronic alcohol use              - Binge drinker, drinks 10-12 shots of vodka. Endorses withdrawals (tachycardia). Denies history of delirium tremens. States last drink was 4-5 months ago.                -History of recurrent falls 2/2 alcohol intoxication.   Sustained frontal parietal subarachnoid hemorrhage on 6/26 due to alcohol-related fall.  -Continue on folic acid, thiamine, and multivitamin     Bipolar 1 disorder, severe              -On bupropion, Prozac     Hypertension              -Continue on Lopressor 25 and Cozaar 50     GERD              -Continue Protonix 40 mg daily     History of HSV-2 vulvovaginitis              -Continue on Valtrex    Drug abuse   -hx of opioid abuse in the past        DVT ppx : Fondaparinux  GI ppx: Protonix    PT/OT: following  Discharge Planning / SW:     Butch Mcginnis MD  Internal Medicine Resident,

## 2023-11-11 ENCOUNTER — APPOINTMENT (OUTPATIENT)
Dept: GENERAL RADIOLOGY | Age: 43
DRG: 464 | End: 2023-11-11
Attending: STUDENT IN AN ORGANIZED HEALTH CARE EDUCATION/TRAINING PROGRAM
Payer: MEDICARE

## 2023-11-11 LAB
ABO/RH: NORMAL
ANION GAP SERPL CALCULATED.3IONS-SCNC: 10 MMOL/L (ref 9–17)
ANTIBODY SCREEN: NEGATIVE
ARM BAND NUMBER: NORMAL
BACTERIA URNS QL MICRO: NORMAL
BASOPHILS # BLD: <0.03 K/UL (ref 0–0.2)
BASOPHILS NFR BLD: 0 % (ref 0–2)
BILIRUB UR QL STRIP: NEGATIVE
BLOOD BANK BLOOD PRODUCT EXPIRATION DATE: NORMAL
BLOOD BANK DISPENSE STATUS: NORMAL
BLOOD BANK DISPENSE STATUS: NORMAL
BLOOD BANK ISBT PRODUCT BLOOD TYPE: 6200
BLOOD BANK PRODUCT CODE: NORMAL
BLOOD BANK SAMPLE EXPIRATION: NORMAL
BLOOD BANK UNIT TYPE AND RH: NORMAL
BPU ID: NORMAL
BPU ID: NORMAL
BUN SERPL-MCNC: 12 MG/DL (ref 6–20)
CALCIUM SERPL-MCNC: 7.7 MG/DL (ref 8.6–10.4)
CASTS #/AREA URNS LPF: NORMAL /LPF (ref 0–8)
CHLORIDE SERPL-SCNC: 100 MMOL/L (ref 98–107)
CLARITY UR: CLEAR
CO2 SERPL-SCNC: 24 MMOL/L (ref 20–31)
COLOR UR: YELLOW
COMPONENT: NORMAL
COMPONENT: NORMAL
CREAT SERPL-MCNC: 0.4 MG/DL (ref 0.5–0.9)
CROSSMATCH RESULT: NORMAL
CROSSMATCH RESULT: NORMAL
CRP SERPL HS-MCNC: 178.3 MG/L (ref 0–5)
EOSINOPHIL # BLD: 0.1 K/UL (ref 0–0.44)
EOSINOPHILS RELATIVE PERCENT: 1 % (ref 1–4)
EPI CELLS #/AREA URNS HPF: NORMAL /HPF (ref 0–5)
ERYTHROCYTE [DISTWIDTH] IN BLOOD BY AUTOMATED COUNT: 15.9 % (ref 11.8–14.4)
GFR SERPL CREATININE-BSD FRML MDRD: >60 ML/MIN/1.73M2
GLUCOSE BLD-MCNC: 135 MG/DL (ref 65–105)
GLUCOSE BLD-MCNC: 139 MG/DL (ref 65–105)
GLUCOSE BLD-MCNC: 180 MG/DL (ref 65–105)
GLUCOSE BLD-MCNC: 203 MG/DL (ref 65–105)
GLUCOSE SERPL-MCNC: 315 MG/DL (ref 70–99)
GLUCOSE UR STRIP-MCNC: NEGATIVE MG/DL
HCT VFR BLD AUTO: 24.8 % (ref 36.3–47.1)
HCT VFR BLD AUTO: 25.9 % (ref 36.3–47.1)
HGB BLD-MCNC: 7.3 G/DL (ref 11.9–15.1)
HGB BLD-MCNC: 7.8 G/DL (ref 11.9–15.1)
HGB UR QL STRIP.AUTO: NEGATIVE
IMM GRANULOCYTES # BLD AUTO: 0.04 K/UL (ref 0–0.3)
IMM GRANULOCYTES NFR BLD: 1 %
KETONES UR STRIP-MCNC: NEGATIVE MG/DL
LEUKOCYTE ESTERASE UR QL STRIP: NEGATIVE
LYMPHOCYTES NFR BLD: 0.84 K/UL (ref 1.1–3.7)
LYMPHOCYTES RELATIVE PERCENT: 10 % (ref 24–43)
MCH RBC QN AUTO: 27.8 PG (ref 25.2–33.5)
MCHC RBC AUTO-ENTMCNC: 30.1 G/DL (ref 28.4–34.8)
MCV RBC AUTO: 92.2 FL (ref 82.6–102.9)
MONOCYTES NFR BLD: 0.64 K/UL (ref 0.1–1.2)
MONOCYTES NFR BLD: 7 % (ref 3–12)
NEUTROPHILS NFR BLD: 82 % (ref 36–65)
NEUTS SEG NFR BLD: 7.21 K/UL (ref 1.5–8.1)
NITRITE UR QL STRIP: NEGATIVE
NRBC BLD-RTO: 0 PER 100 WBC
PH UR STRIP: 5.5 [PH] (ref 5–8)
PLATELET # BLD AUTO: 323 K/UL (ref 138–453)
PMV BLD AUTO: 11.4 FL (ref 8.1–13.5)
POTASSIUM SERPL-SCNC: 4.1 MMOL/L (ref 3.7–5.3)
PROT UR STRIP-MCNC: NEGATIVE MG/DL
RBC # BLD AUTO: 2.81 M/UL (ref 3.95–5.11)
RBC # BLD: ABNORMAL 10*6/UL
RBC #/AREA URNS HPF: NORMAL /HPF (ref 0–4)
SODIUM SERPL-SCNC: 134 MMOL/L (ref 135–144)
SP GR UR STRIP: 1.02 (ref 1–1.03)
TRANSFUSION STATUS: NORMAL
TRANSFUSION STATUS: NORMAL
UNIT DIVISION: 0
UNIT DIVISION: 0
UNIT ISSUE DATE/TIME: NORMAL
UROBILINOGEN UR STRIP-ACNC: NORMAL EU/DL (ref 0–1)
WBC #/AREA URNS HPF: NORMAL /HPF (ref 0–5)
WBC OTHER # BLD: 8.9 K/UL (ref 3.5–11.3)

## 2023-11-11 PROCEDURE — 99232 SBSQ HOSP IP/OBS MODERATE 35: CPT | Performed by: INTERNAL MEDICINE

## 2023-11-11 PROCEDURE — 71045 X-RAY EXAM CHEST 1 VIEW: CPT

## 2023-11-11 PROCEDURE — 6360000002 HC RX W HCPCS: Performed by: STUDENT IN AN ORGANIZED HEALTH CARE EDUCATION/TRAINING PROGRAM

## 2023-11-11 PROCEDURE — 81001 URINALYSIS AUTO W/SCOPE: CPT

## 2023-11-11 PROCEDURE — 85014 HEMATOCRIT: CPT

## 2023-11-11 PROCEDURE — 6360000002 HC RX W HCPCS

## 2023-11-11 PROCEDURE — 6360000002 HC RX W HCPCS: Performed by: NURSE PRACTITIONER

## 2023-11-11 PROCEDURE — 6370000000 HC RX 637 (ALT 250 FOR IP): Performed by: NURSE PRACTITIONER

## 2023-11-11 PROCEDURE — 6370000000 HC RX 637 (ALT 250 FOR IP)

## 2023-11-11 PROCEDURE — 36415 COLL VENOUS BLD VENIPUNCTURE: CPT

## 2023-11-11 PROCEDURE — 85025 COMPLETE CBC W/AUTO DIFF WBC: CPT

## 2023-11-11 PROCEDURE — 1200000000 HC SEMI PRIVATE

## 2023-11-11 PROCEDURE — 86140 C-REACTIVE PROTEIN: CPT

## 2023-11-11 PROCEDURE — 2580000003 HC RX 258

## 2023-11-11 PROCEDURE — 85018 HEMOGLOBIN: CPT

## 2023-11-11 PROCEDURE — 6370000000 HC RX 637 (ALT 250 FOR IP): Performed by: STUDENT IN AN ORGANIZED HEALTH CARE EDUCATION/TRAINING PROGRAM

## 2023-11-11 PROCEDURE — 82947 ASSAY GLUCOSE BLOOD QUANT: CPT

## 2023-11-11 PROCEDURE — 80048 BASIC METABOLIC PNL TOTAL CA: CPT

## 2023-11-11 RX ORDER — KETOROLAC TROMETHAMINE 30 MG/ML
30 INJECTION, SOLUTION INTRAMUSCULAR; INTRAVENOUS EVERY 6 HOURS PRN
Status: DISCONTINUED | OUTPATIENT
Start: 2023-11-11 | End: 2023-11-11

## 2023-11-11 RX ADMIN — HYDROMORPHONE HYDROCHLORIDE 1 MG: 1 INJECTION, SOLUTION INTRAMUSCULAR; INTRAVENOUS; SUBCUTANEOUS at 23:14

## 2023-11-11 RX ADMIN — INSULIN LISPRO 4 UNITS: 100 INJECTION, SOLUTION INTRAVENOUS; SUBCUTANEOUS at 12:44

## 2023-11-11 RX ADMIN — GABAPENTIN 100 MG: 100 CAPSULE ORAL at 15:03

## 2023-11-11 RX ADMIN — Medication 100 MG: at 09:38

## 2023-11-11 RX ADMIN — OXYCODONE HYDROCHLORIDE 10 MG: 5 TABLET ORAL at 15:12

## 2023-11-11 RX ADMIN — HYDROMORPHONE HYDROCHLORIDE 1 MG: 1 INJECTION, SOLUTION INTRAMUSCULAR; INTRAVENOUS; SUBCUTANEOUS at 12:44

## 2023-11-11 RX ADMIN — LORAZEPAM 0.5 MG: 0.5 TABLET ORAL at 06:19

## 2023-11-11 RX ADMIN — HYDROMORPHONE HYDROCHLORIDE 1 MG: 1 INJECTION, SOLUTION INTRAMUSCULAR; INTRAVENOUS; SUBCUTANEOUS at 18:51

## 2023-11-11 RX ADMIN — OXYCODONE HYDROCHLORIDE 10 MG: 5 TABLET ORAL at 11:17

## 2023-11-11 RX ADMIN — CYCLOBENZAPRINE 10 MG: 10 TABLET, FILM COATED ORAL at 17:13

## 2023-11-11 RX ADMIN — OXYCODONE HYDROCHLORIDE 10 MG: 5 TABLET ORAL at 03:35

## 2023-11-11 RX ADMIN — METOPROLOL TARTRATE 25 MG: 25 TABLET ORAL at 09:38

## 2023-11-11 RX ADMIN — INSULIN GLARGINE 10 UNITS: 100 INJECTION, SOLUTION SUBCUTANEOUS at 22:25

## 2023-11-11 RX ADMIN — BACLOFEN 20 MG: 10 TABLET ORAL at 09:39

## 2023-11-11 RX ADMIN — LOSARTAN POTASSIUM 50 MG: 50 TABLET, FILM COATED ORAL at 09:38

## 2023-11-11 RX ADMIN — ALCOHOL 1 TABLET: 70.47 GEL TOPICAL at 09:38

## 2023-11-11 RX ADMIN — OXYCODONE HYDROCHLORIDE 10 MG: 5 TABLET ORAL at 07:30

## 2023-11-11 RX ADMIN — RIFAMPIN 600 MG: 300 CAPSULE ORAL at 09:39

## 2023-11-11 RX ADMIN — GABAPENTIN 100 MG: 100 CAPSULE ORAL at 09:39

## 2023-11-11 RX ADMIN — SODIUM CHLORIDE, PRESERVATIVE FREE 10 ML: 5 INJECTION INTRAVENOUS at 09:40

## 2023-11-11 RX ADMIN — BUPROPION HYDROCHLORIDE 150 MG: 150 TABLET, EXTENDED RELEASE ORAL at 09:39

## 2023-11-11 RX ADMIN — LORAZEPAM 0.5 MG: 0.5 TABLET ORAL at 20:30

## 2023-11-11 RX ADMIN — KETOROLAC TROMETHAMINE 30 MG: 30 INJECTION, SOLUTION INTRAMUSCULAR; INTRAVENOUS at 07:31

## 2023-11-11 RX ADMIN — VALACYCLOVIR HYDROCHLORIDE 500 MG: 500 TABLET, FILM COATED ORAL at 09:38

## 2023-11-11 RX ADMIN — Medication 6 MG: at 23:14

## 2023-11-11 RX ADMIN — BACLOFEN 20 MG: 10 TABLET ORAL at 20:30

## 2023-11-11 RX ADMIN — OXYCODONE HYDROCHLORIDE 10 MG: 5 TABLET ORAL at 20:30

## 2023-11-11 RX ADMIN — FLUOXETINE HYDROCHLORIDE 40 MG: 20 CAPSULE ORAL at 09:39

## 2023-11-11 RX ADMIN — ACETAMINOPHEN 1000 MG: 500 TABLET ORAL at 09:47

## 2023-11-11 RX ADMIN — FONDAPARINUX SODIUM 10 MG: 5 INJECTION, SOLUTION SUBCUTANEOUS at 09:39

## 2023-11-11 RX ADMIN — ACETAMINOPHEN 1000 MG: 500 TABLET ORAL at 17:14

## 2023-11-11 RX ADMIN — INSULIN GLARGINE 24 UNITS: 100 INJECTION, SOLUTION SUBCUTANEOUS at 09:47

## 2023-11-11 RX ADMIN — PRAVASTATIN SODIUM 40 MG: 20 TABLET ORAL at 20:30

## 2023-11-11 RX ADMIN — CYCLOBENZAPRINE 10 MG: 10 TABLET, FILM COATED ORAL at 23:14

## 2023-11-11 RX ADMIN — FOLIC ACID 1 MG: 1 TABLET ORAL at 09:39

## 2023-11-11 RX ADMIN — GABAPENTIN 100 MG: 100 CAPSULE ORAL at 20:30

## 2023-11-11 RX ADMIN — CYCLOBENZAPRINE 10 MG: 10 TABLET, FILM COATED ORAL at 11:17

## 2023-11-11 RX ADMIN — Medication 2000 MG: at 15:04

## 2023-11-11 RX ADMIN — CYCLOBENZAPRINE 10 MG: 10 TABLET, FILM COATED ORAL at 04:53

## 2023-11-11 RX ADMIN — IRON SUCROSE 200 MG: 20 INJECTION, SOLUTION INTRAVENOUS at 12:48

## 2023-11-11 RX ADMIN — ACETAMINOPHEN 1000 MG: 500 TABLET ORAL at 03:36

## 2023-11-11 ASSESSMENT — PAIN SCALES - GENERAL
PAINLEVEL_OUTOF10: 7
PAINLEVEL_OUTOF10: 8
PAINLEVEL_OUTOF10: 7
PAINLEVEL_OUTOF10: 8
PAINLEVEL_OUTOF10: 7
PAINLEVEL_OUTOF10: 9
PAINLEVEL_OUTOF10: 8
PAINLEVEL_OUTOF10: 8
PAINLEVEL_OUTOF10: 7
PAINLEVEL_OUTOF10: 8
PAINLEVEL_OUTOF10: 8
PAINLEVEL_OUTOF10: 7
PAINLEVEL_OUTOF10: 8
PAINLEVEL_OUTOF10: 8

## 2023-11-11 ASSESSMENT — PAIN DESCRIPTION - DESCRIPTORS
DESCRIPTORS: ACHING;SHARP
DESCRIPTORS: SHARP;BURNING
DESCRIPTORS: ACHING;SORE;THROBBING
DESCRIPTORS: ACHING;SORE;STABBING;THROBBING
DESCRIPTORS: SHARP
DESCRIPTORS: ACHING;SORE
DESCRIPTORS: BURNING;SHARP
DESCRIPTORS: DISCOMFORT
DESCRIPTORS: ACHING;THROBBING;SORE

## 2023-11-11 ASSESSMENT — PAIN DESCRIPTION - LOCATION
LOCATION: HIP
LOCATION: HIP;LEG

## 2023-11-11 ASSESSMENT — PAIN DESCRIPTION - ORIENTATION
ORIENTATION: LEFT

## 2023-11-11 NOTE — PROGRESS NOTES
[facility]  Internal Medicine Teaching Residency Program  Inpatient Daily Progress Note  ______________________________________________________________________________    Patient: Ignacia Mariscal  YOB: 1980   XRH:[id number]    Acct: [de-identified]     Room: [address] [place] [address]  Admit date: 11/7/2023  Today's date: 11/11/23  Number of days in the hospital: 4    SUBJECTIVE   Admitting Diagnosis: S/P total left hip arthroplasty  CC: Left hip pain  Pt examined at bedside. Chart & results reviewed. Afebrile. VSS on RA. Patient reports significant pain over left hip s/p left hip surgery on 11/7. Unable to get OOB yesterday with PT due to pain. Distal drain in place, 60cc serosanguinous output overnight. BM this morning. UOP 0.6/hr, vivas in place. Blood gluc 315, on home insulin with HDSS. ROS:  Constitutional:  negative for chills, fevers, sweats  Respiratory:  negative for cough, dyspnea on exertion, hemoptysis, shortness of breath, wheezing  Cardiovascular:  negative for chest pain, chest pressure/discomfort, lower extremity edema, palpitations  Gastrointestinal:  negative for abdominal pain, constipation, diarrhea, nausea, vomiting  Neurological:  negative for dizziness, headache left hip pain  BRIEF HISTORY     The patient is a pleasant 39 y.o. female with a past medical history of DM2 on insulin, antiphospholipid syndrome on fondaparinux, bipolar 1, recurrent falls secondary to alcohol use, Painting's esophagus presents for removal of left hip prosthesis with placement of antibiotic spacer and incision and drainage of chronic left hip infection by orthopedic surgery. Patient sustained a left femoral neck fracture secondary to fall while intoxicated for which she underwent left hemiarthroplasty on 7/19. Surgery complicated by wound dehiscence and left hip abscess, culture positive for MSSA status post I&D.   At that time, infection was determined to not

## 2023-11-12 LAB
CRP SERPL HS-MCNC: 181.8 MG/L (ref 0–5)
GLUCOSE BLD-MCNC: 103 MG/DL (ref 65–105)
GLUCOSE BLD-MCNC: 106 MG/DL (ref 65–105)
GLUCOSE BLD-MCNC: 115 MG/DL (ref 65–105)
GLUCOSE BLD-MCNC: 148 MG/DL (ref 65–105)
HCT VFR BLD AUTO: 23.9 % (ref 36.3–47.1)
HCT VFR BLD AUTO: 33.7 % (ref 36.3–47.1)
HGB BLD-MCNC: 7 G/DL (ref 11.9–15.1)
HGB BLD-MCNC: 9.4 G/DL (ref 11.9–15.1)

## 2023-11-12 PROCEDURE — 6370000000 HC RX 637 (ALT 250 FOR IP)

## 2023-11-12 PROCEDURE — 6370000000 HC RX 637 (ALT 250 FOR IP): Performed by: STUDENT IN AN ORGANIZED HEALTH CARE EDUCATION/TRAINING PROGRAM

## 2023-11-12 PROCEDURE — 1200000000 HC SEMI PRIVATE

## 2023-11-12 PROCEDURE — 2580000003 HC RX 258

## 2023-11-12 PROCEDURE — 6370000000 HC RX 637 (ALT 250 FOR IP): Performed by: NURSE PRACTITIONER

## 2023-11-12 PROCEDURE — 86140 C-REACTIVE PROTEIN: CPT

## 2023-11-12 PROCEDURE — 6360000002 HC RX W HCPCS

## 2023-11-12 PROCEDURE — 6360000002 HC RX W HCPCS: Performed by: NURSE PRACTITIONER

## 2023-11-12 PROCEDURE — 99232 SBSQ HOSP IP/OBS MODERATE 35: CPT | Performed by: INTERNAL MEDICINE

## 2023-11-12 PROCEDURE — 86901 BLOOD TYPING SEROLOGIC RH(D): CPT

## 2023-11-12 PROCEDURE — P9016 RBC LEUKOCYTES REDUCED: HCPCS

## 2023-11-12 PROCEDURE — 86920 COMPATIBILITY TEST SPIN: CPT

## 2023-11-12 PROCEDURE — 82947 ASSAY GLUCOSE BLOOD QUANT: CPT

## 2023-11-12 PROCEDURE — 85014 HEMATOCRIT: CPT

## 2023-11-12 PROCEDURE — 86900 BLOOD TYPING SEROLOGIC ABO: CPT

## 2023-11-12 PROCEDURE — 94761 N-INVAS EAR/PLS OXIMETRY MLT: CPT

## 2023-11-12 PROCEDURE — 36415 COLL VENOUS BLD VENIPUNCTURE: CPT

## 2023-11-12 PROCEDURE — 86850 RBC ANTIBODY SCREEN: CPT

## 2023-11-12 PROCEDURE — 85018 HEMOGLOBIN: CPT

## 2023-11-12 PROCEDURE — 36430 TRANSFUSION BLD/BLD COMPNT: CPT

## 2023-11-12 RX ORDER — OXYCODONE HYDROCHLORIDE 5 MG/1
10 TABLET ORAL EVERY 4 HOURS PRN
Status: DISCONTINUED | OUTPATIENT
Start: 2023-11-12 | End: 2023-11-15 | Stop reason: HOSPADM

## 2023-11-12 RX ORDER — SODIUM CHLORIDE 9 MG/ML
INJECTION, SOLUTION INTRAVENOUS PRN
Status: DISCONTINUED | OUTPATIENT
Start: 2023-11-12 | End: 2023-11-15 | Stop reason: HOSPADM

## 2023-11-12 RX ORDER — OXYCODONE HYDROCHLORIDE 5 MG/1
5 TABLET ORAL EVERY 4 HOURS PRN
Status: DISCONTINUED | OUTPATIENT
Start: 2023-11-12 | End: 2023-11-15 | Stop reason: HOSPADM

## 2023-11-12 RX ADMIN — SODIUM CHLORIDE, PRESERVATIVE FREE 10 ML: 5 INJECTION INTRAVENOUS at 08:30

## 2023-11-12 RX ADMIN — GABAPENTIN 100 MG: 100 CAPSULE ORAL at 21:51

## 2023-11-12 RX ADMIN — ACETAMINOPHEN 1000 MG: 500 TABLET ORAL at 18:08

## 2023-11-12 RX ADMIN — Medication 2000 MG: at 16:01

## 2023-11-12 RX ADMIN — FONDAPARINUX SODIUM 10 MG: 5 INJECTION, SOLUTION SUBCUTANEOUS at 10:10

## 2023-11-12 RX ADMIN — OXYCODONE HYDROCHLORIDE 10 MG: 5 TABLET ORAL at 06:01

## 2023-11-12 RX ADMIN — CYCLOBENZAPRINE 10 MG: 10 TABLET, FILM COATED ORAL at 16:15

## 2023-11-12 RX ADMIN — OXYCODONE HYDROCHLORIDE 10 MG: 5 TABLET ORAL at 14:11

## 2023-11-12 RX ADMIN — OXYCODONE HYDROCHLORIDE 10 MG: 5 TABLET ORAL at 10:10

## 2023-11-12 RX ADMIN — HYDROMORPHONE HYDROCHLORIDE 1 MG: 1 INJECTION, SOLUTION INTRAMUSCULAR; INTRAVENOUS; SUBCUTANEOUS at 05:05

## 2023-11-12 RX ADMIN — CYCLOBENZAPRINE 10 MG: 10 TABLET, FILM COATED ORAL at 21:51

## 2023-11-12 RX ADMIN — OXYCODONE 10 MG: 5 TABLET ORAL at 22:05

## 2023-11-12 RX ADMIN — ACETAMINOPHEN 1000 MG: 500 TABLET ORAL at 07:01

## 2023-11-12 RX ADMIN — ACETAMINOPHEN 1000 MG: 500 TABLET ORAL at 11:15

## 2023-11-12 RX ADMIN — BACLOFEN 20 MG: 10 TABLET ORAL at 08:29

## 2023-11-12 RX ADMIN — HYDROMORPHONE HYDROCHLORIDE 1 MG: 1 INJECTION, SOLUTION INTRAMUSCULAR; INTRAVENOUS; SUBCUTANEOUS at 11:16

## 2023-11-12 RX ADMIN — RIFAMPIN 600 MG: 300 CAPSULE ORAL at 08:30

## 2023-11-12 RX ADMIN — OXYCODONE 10 MG: 5 TABLET ORAL at 18:08

## 2023-11-12 RX ADMIN — LORAZEPAM 0.5 MG: 0.5 TABLET ORAL at 04:19

## 2023-11-12 RX ADMIN — IRON SUCROSE 200 MG: 20 INJECTION, SOLUTION INTRAVENOUS at 11:20

## 2023-11-12 RX ADMIN — GABAPENTIN 100 MG: 100 CAPSULE ORAL at 13:39

## 2023-11-12 RX ADMIN — BUPROPION HYDROCHLORIDE 150 MG: 150 TABLET, EXTENDED RELEASE ORAL at 08:29

## 2023-11-12 RX ADMIN — LORAZEPAM 0.5 MG: 0.5 TABLET ORAL at 16:15

## 2023-11-12 RX ADMIN — HYDROMORPHONE HYDROCHLORIDE 0.5 MG: 1 INJECTION, SOLUTION INTRAMUSCULAR; INTRAVENOUS; SUBCUTANEOUS at 19:32

## 2023-11-12 RX ADMIN — PRAVASTATIN SODIUM 40 MG: 20 TABLET ORAL at 21:51

## 2023-11-12 RX ADMIN — FOLIC ACID 1 MG: 1 TABLET ORAL at 08:29

## 2023-11-12 RX ADMIN — VALACYCLOVIR HYDROCHLORIDE 500 MG: 500 TABLET, FILM COATED ORAL at 08:30

## 2023-11-12 RX ADMIN — BACLOFEN 20 MG: 10 TABLET ORAL at 21:51

## 2023-11-12 RX ADMIN — ACETAMINOPHEN 1000 MG: 500 TABLET ORAL at 00:40

## 2023-11-12 RX ADMIN — INSULIN GLARGINE 10 UNITS: 100 INJECTION, SOLUTION SUBCUTANEOUS at 21:53

## 2023-11-12 RX ADMIN — Medication 100 MG: at 08:30

## 2023-11-12 RX ADMIN — OXYCODONE HYDROCHLORIDE 10 MG: 5 TABLET ORAL at 01:04

## 2023-11-12 RX ADMIN — SODIUM CHLORIDE, PRESERVATIVE FREE 10 ML: 5 INJECTION INTRAVENOUS at 21:50

## 2023-11-12 RX ADMIN — FLUOXETINE HYDROCHLORIDE 40 MG: 20 CAPSULE ORAL at 08:29

## 2023-11-12 RX ADMIN — METOPROLOL TARTRATE 25 MG: 25 TABLET ORAL at 08:29

## 2023-11-12 RX ADMIN — GABAPENTIN 100 MG: 100 CAPSULE ORAL at 08:29

## 2023-11-12 RX ADMIN — CYCLOBENZAPRINE 10 MG: 10 TABLET, FILM COATED ORAL at 10:13

## 2023-11-12 RX ADMIN — ACETAMINOPHEN 1000 MG: 500 TABLET ORAL at 23:40

## 2023-11-12 RX ADMIN — ALCOHOL 1 TABLET: 70.47 GEL TOPICAL at 08:30

## 2023-11-12 RX ADMIN — CYCLOBENZAPRINE 10 MG: 10 TABLET, FILM COATED ORAL at 04:19

## 2023-11-12 RX ADMIN — HYDROMORPHONE HYDROCHLORIDE 0.5 MG: 1 INJECTION, SOLUTION INTRAMUSCULAR; INTRAVENOUS; SUBCUTANEOUS at 23:41

## 2023-11-12 RX ADMIN — LOSARTAN POTASSIUM 50 MG: 50 TABLET, FILM COATED ORAL at 08:29

## 2023-11-12 ASSESSMENT — PAIN DESCRIPTION - LOCATION
LOCATION: HIP

## 2023-11-12 ASSESSMENT — PAIN DESCRIPTION - ORIENTATION
ORIENTATION: LEFT

## 2023-11-12 ASSESSMENT — PAIN DESCRIPTION - DESCRIPTORS
DESCRIPTORS: DISCOMFORT
DESCRIPTORS: ACHING;SORE;THROBBING
DESCRIPTORS: BURNING;SHARP
DESCRIPTORS: SORE;SHARP;ACHING
DESCRIPTORS: ACHING;SORE;BURNING
DESCRIPTORS: SHARP;BURNING
DESCRIPTORS: SHARP;BURNING;OTHER (COMMENT)
DESCRIPTORS: BURNING;SHARP
DESCRIPTORS: SHARP
DESCRIPTORS: DISCOMFORT
DESCRIPTORS: ACHING;SORE;THROBBING
DESCRIPTORS: SHARP;BURNING
DESCRIPTORS: SHARP;BURNING

## 2023-11-12 ASSESSMENT — PAIN SCALES - GENERAL
PAINLEVEL_OUTOF10: 8
PAINLEVEL_OUTOF10: 6
PAINLEVEL_OUTOF10: 9
PAINLEVEL_OUTOF10: 8
PAINLEVEL_OUTOF10: 10
PAINLEVEL_OUTOF10: 8
PAINLEVEL_OUTOF10: 10
PAINLEVEL_OUTOF10: 7
PAINLEVEL_OUTOF10: 8
PAINLEVEL_OUTOF10: 7
PAINLEVEL_OUTOF10: 7
PAINLEVEL_OUTOF10: 8
PAINLEVEL_OUTOF10: 6
PAINLEVEL_OUTOF10: 7
PAINLEVEL_OUTOF10: 7
PAINLEVEL_OUTOF10: 8
PAINLEVEL_OUTOF10: 7
PAINLEVEL_OUTOF10: 8
PAINLEVEL_OUTOF10: 8
PAINLEVEL_OUTOF10: 6

## 2023-11-12 ASSESSMENT — PAIN DESCRIPTION - PAIN TYPE
TYPE: SURGICAL PAIN

## 2023-11-12 NOTE — PROGRESS NOTES
[facility] - Progress Note      Today's Date and Time: 11/12/2023, 8:05 AM    Impression :   S/p left hip cecilio arthroplasty 7-19-23  Left hip abscess/surgical wound dehiscence. Deep open wound - cx MSSA  Post initial I&D 8/21 then dressing change 8/24   Residual Left hip fluid collection s/p I&D with secondary closure 10/24/2023  Wound culture 10/24/2023 growing Staph aureus  MSSA  S/p removal of left hip prosthesis with placement of antibiotic spacer with irrigation and excisional debridement on 11/7/23    Antiphospholipid syndrome  Alcohol abuse with alcoholic hepatitis without ascites  Supraventricular tachycardia  Anxiety  Bipolar disorder    Recommendations:     Continue Rocephin 2 gm IV q 24 hr for a total of 4 weeks. Stop date 12-7-23  Rifampin 600 mg po daily for synergy against S aureus until 12/7/23  Wound culture 10/24/2023 Staph aureus- MSSA  Wound Care       Medical Decision Making/Summary/Discussion:11/12/2023     Patient with left hip cecilio arthroplasty 7-19-23  Subsequent Left hip abscess and surgical wound dehiscence on 8-21-23  Deep open wound - cx MSSA  Post initial I&D 8/21 then dressing change 8/24   S/P course of ceftriaxone as an outpatient  Persistent wound drainage leading to new I&D and wound closure on 10-24-23  Wound culture with S aureus  Ceftriaxone continued. Rifampin added for synergy given prior lack of response with Ceftriaxone alone.   Infection Control Recommendations   Hilbert Precautions    Antimicrobial Stewardship Recommendations     Simplification of therapy  Targeted therapy    Coordination of Outpatient Care:   Estimated Length of IV antimicrobials:11-25-23  Patient will need Midline Catheter Insertion: TBD  Patient will need PICC line Insertion:TBD  Patient will need: Home IV , [address] [place],  [facility],  LTAC: TBD  Patient will need outpatient wound care: Yes    Chief complaint/reason for consultation:   Hip infection      History of

## 2023-11-12 NOTE — PROGRESS NOTES
Protonix    PT/OT: following  Discharge Planning / SW: Alfa Frias MD  Internal Medicine Resident, PGY-1  [address];  [place]  11/12/2023, 10:36 AM

## 2023-11-12 NOTE — CARE COORDINATION
Transitional planning: Plan remains to return to [facility]. Still has hemovac and is receiving PRBC's. Hgb 7.0, CRP trending up.

## 2023-11-13 LAB
ABO/RH: NORMAL
ANTIBODY SCREEN: NEGATIVE
ARM BAND NUMBER: NORMAL
BLOOD BANK BLOOD PRODUCT EXPIRATION DATE: NORMAL
BLOOD BANK DISPENSE STATUS: NORMAL
BLOOD BANK ISBT PRODUCT BLOOD TYPE: 6200
BLOOD BANK PRODUCT CODE: NORMAL
BLOOD BANK SAMPLE EXPIRATION: NORMAL
BLOOD BANK UNIT TYPE AND RH: NORMAL
BPU ID: NORMAL
COMPONENT: NORMAL
CROSSMATCH RESULT: NORMAL
ERYTHROCYTE [DISTWIDTH] IN BLOOD BY AUTOMATED COUNT: 16.3 % (ref 11.8–14.4)
GLUCOSE BLD-MCNC: 111 MG/DL (ref 65–105)
GLUCOSE BLD-MCNC: 191 MG/DL (ref 65–105)
GLUCOSE BLD-MCNC: 214 MG/DL (ref 65–105)
GLUCOSE BLD-MCNC: 218 MG/DL (ref 65–105)
HCT VFR BLD AUTO: 31.8 % (ref 36.3–47.1)
HGB BLD-MCNC: 10 G/DL (ref 11.9–15.1)
MCH RBC QN AUTO: 29.4 PG (ref 25.2–33.5)
MCHC RBC AUTO-ENTMCNC: 31.4 G/DL (ref 28.4–34.8)
MCV RBC AUTO: 93.5 FL (ref 82.6–102.9)
MICROORGANISM SPEC CULT: NORMAL
MICROORGANISM/AGENT SPEC: NORMAL
NRBC BLD-RTO: 0 PER 100 WBC
PLATELET # BLD AUTO: 199 K/UL (ref 138–453)
PMV BLD AUTO: 12.6 FL (ref 8.1–13.5)
RBC # BLD AUTO: 3.4 M/UL (ref 3.95–5.11)
SPECIMEN DESCRIPTION: NORMAL
TRANSFUSION STATUS: NORMAL
UNIT DIVISION: 0
UNIT ISSUE DATE/TIME: NORMAL
WBC OTHER # BLD: 9 K/UL (ref 3.5–11.3)

## 2023-11-13 PROCEDURE — 6370000000 HC RX 637 (ALT 250 FOR IP)

## 2023-11-13 PROCEDURE — 87040 BLOOD CULTURE FOR BACTERIA: CPT

## 2023-11-13 PROCEDURE — 97112 NEUROMUSCULAR REEDUCATION: CPT

## 2023-11-13 PROCEDURE — 6360000002 HC RX W HCPCS

## 2023-11-13 PROCEDURE — 1200000000 HC SEMI PRIVATE

## 2023-11-13 PROCEDURE — 99232 SBSQ HOSP IP/OBS MODERATE 35: CPT | Performed by: INTERNAL MEDICINE

## 2023-11-13 PROCEDURE — 6370000000 HC RX 637 (ALT 250 FOR IP): Performed by: NURSE PRACTITIONER

## 2023-11-13 PROCEDURE — 85027 COMPLETE CBC AUTOMATED: CPT

## 2023-11-13 PROCEDURE — 82947 ASSAY GLUCOSE BLOOD QUANT: CPT

## 2023-11-13 PROCEDURE — 2580000003 HC RX 258

## 2023-11-13 PROCEDURE — 36415 COLL VENOUS BLD VENIPUNCTURE: CPT

## 2023-11-13 PROCEDURE — 97530 THERAPEUTIC ACTIVITIES: CPT

## 2023-11-13 PROCEDURE — 2580000003 HC RX 258: Performed by: NURSE PRACTITIONER

## 2023-11-13 PROCEDURE — 97116 GAIT TRAINING THERAPY: CPT

## 2023-11-13 PROCEDURE — 6360000002 HC RX W HCPCS: Performed by: NURSE PRACTITIONER

## 2023-11-13 PROCEDURE — 6370000000 HC RX 637 (ALT 250 FOR IP): Performed by: STUDENT IN AN ORGANIZED HEALTH CARE EDUCATION/TRAINING PROGRAM

## 2023-11-13 RX ADMIN — SODIUM CHLORIDE, PRESERVATIVE FREE 10 ML: 5 INJECTION INTRAVENOUS at 21:57

## 2023-11-13 RX ADMIN — INSULIN LISPRO 4 UNITS: 100 INJECTION, SOLUTION INTRAVENOUS; SUBCUTANEOUS at 12:08

## 2023-11-13 RX ADMIN — HYDROMORPHONE HYDROCHLORIDE 0.5 MG: 1 INJECTION, SOLUTION INTRAMUSCULAR; INTRAVENOUS; SUBCUTANEOUS at 05:31

## 2023-11-13 RX ADMIN — CYCLOBENZAPRINE 10 MG: 10 TABLET, FILM COATED ORAL at 21:56

## 2023-11-13 RX ADMIN — ACETAMINOPHEN 1000 MG: 500 TABLET ORAL at 18:29

## 2023-11-13 RX ADMIN — BUPROPION HYDROCHLORIDE 150 MG: 150 TABLET, EXTENDED RELEASE ORAL at 08:23

## 2023-11-13 RX ADMIN — ALCOHOL 1 TABLET: 70.47 GEL TOPICAL at 08:23

## 2023-11-13 RX ADMIN — CYCLOBENZAPRINE 10 MG: 10 TABLET, FILM COATED ORAL at 10:27

## 2023-11-13 RX ADMIN — OXYCODONE 10 MG: 5 TABLET ORAL at 16:20

## 2023-11-13 RX ADMIN — HYDROMORPHONE HYDROCHLORIDE 0.5 MG: 1 INJECTION, SOLUTION INTRAMUSCULAR; INTRAVENOUS; SUBCUTANEOUS at 23:40

## 2023-11-13 RX ADMIN — GABAPENTIN 100 MG: 100 CAPSULE ORAL at 13:19

## 2023-11-13 RX ADMIN — RIFAMPIN 600 MG: 300 CAPSULE ORAL at 08:23

## 2023-11-13 RX ADMIN — GABAPENTIN 100 MG: 100 CAPSULE ORAL at 08:23

## 2023-11-13 RX ADMIN — Medication 2000 MG: at 15:38

## 2023-11-13 RX ADMIN — BACLOFEN 20 MG: 10 TABLET ORAL at 21:56

## 2023-11-13 RX ADMIN — FOLIC ACID 1 MG: 1 TABLET ORAL at 08:23

## 2023-11-13 RX ADMIN — ACETAMINOPHEN 1000 MG: 500 TABLET ORAL at 23:39

## 2023-11-13 RX ADMIN — LOSARTAN POTASSIUM 50 MG: 50 TABLET, FILM COATED ORAL at 08:23

## 2023-11-13 RX ADMIN — GABAPENTIN 100 MG: 100 CAPSULE ORAL at 21:56

## 2023-11-13 RX ADMIN — HYDROMORPHONE HYDROCHLORIDE 0.5 MG: 1 INJECTION, SOLUTION INTRAMUSCULAR; INTRAVENOUS; SUBCUTANEOUS at 09:32

## 2023-11-13 RX ADMIN — VALACYCLOVIR HYDROCHLORIDE 500 MG: 500 TABLET, FILM COATED ORAL at 08:23

## 2023-11-13 RX ADMIN — FONDAPARINUX SODIUM 10 MG: 5 INJECTION, SOLUTION SUBCUTANEOUS at 10:23

## 2023-11-13 RX ADMIN — LORAZEPAM 0.5 MG: 0.5 TABLET ORAL at 10:23

## 2023-11-13 RX ADMIN — INSULIN GLARGINE 10 UNITS: 100 INJECTION, SOLUTION SUBCUTANEOUS at 21:57

## 2023-11-13 RX ADMIN — BACLOFEN 20 MG: 10 TABLET ORAL at 08:23

## 2023-11-13 RX ADMIN — OXYCODONE 10 MG: 5 TABLET ORAL at 08:23

## 2023-11-13 RX ADMIN — OXYCODONE 10 MG: 5 TABLET ORAL at 21:48

## 2023-11-13 RX ADMIN — OXYCODONE 10 MG: 5 TABLET ORAL at 12:08

## 2023-11-13 RX ADMIN — LORAZEPAM 0.5 MG: 0.5 TABLET ORAL at 18:29

## 2023-11-13 RX ADMIN — CYCLOBENZAPRINE 10 MG: 10 TABLET, FILM COATED ORAL at 04:31

## 2023-11-13 RX ADMIN — FLUOXETINE HYDROCHLORIDE 40 MG: 20 CAPSULE ORAL at 08:23

## 2023-11-13 RX ADMIN — METOPROLOL TARTRATE 25 MG: 25 TABLET ORAL at 08:23

## 2023-11-13 RX ADMIN — CYCLOBENZAPRINE 10 MG: 10 TABLET, FILM COATED ORAL at 16:21

## 2023-11-13 RX ADMIN — SODIUM CHLORIDE, PRESERVATIVE FREE 10 ML: 5 INJECTION INTRAVENOUS at 08:24

## 2023-11-13 RX ADMIN — OXYCODONE 10 MG: 5 TABLET ORAL at 04:31

## 2023-11-13 RX ADMIN — ACETAMINOPHEN 1000 MG: 500 TABLET ORAL at 12:07

## 2023-11-13 RX ADMIN — HYDROMORPHONE HYDROCHLORIDE 0.5 MG: 1 INJECTION, SOLUTION INTRAMUSCULAR; INTRAVENOUS; SUBCUTANEOUS at 13:18

## 2023-11-13 RX ADMIN — PRAVASTATIN SODIUM 40 MG: 20 TABLET ORAL at 21:59

## 2023-11-13 RX ADMIN — HYDROMORPHONE HYDROCHLORIDE 0.5 MG: 1 INJECTION, SOLUTION INTRAMUSCULAR; INTRAVENOUS; SUBCUTANEOUS at 17:29

## 2023-11-13 RX ADMIN — Medication 100 MG: at 08:23

## 2023-11-13 RX ADMIN — ACETAMINOPHEN 1000 MG: 500 TABLET ORAL at 05:10

## 2023-11-13 ASSESSMENT — PAIN DESCRIPTION - LOCATION
LOCATION: HIP

## 2023-11-13 ASSESSMENT — PAIN SCALES - GENERAL
PAINLEVEL_OUTOF10: 8
PAINLEVEL_OUTOF10: 9
PAINLEVEL_OUTOF10: 6
PAINLEVEL_OUTOF10: 7
PAINLEVEL_OUTOF10: 10
PAINLEVEL_OUTOF10: 8
PAINLEVEL_OUTOF10: 7
PAINLEVEL_OUTOF10: 10
PAINLEVEL_OUTOF10: 7

## 2023-11-13 ASSESSMENT — PAIN DESCRIPTION - ORIENTATION
ORIENTATION: LEFT

## 2023-11-13 ASSESSMENT — PAIN DESCRIPTION - DESCRIPTORS
DESCRIPTORS: DISCOMFORT
DESCRIPTORS: DISCOMFORT
DESCRIPTORS: THROBBING;SORE;STABBING
DESCRIPTORS: ACHING;SORE
DESCRIPTORS: DISCOMFORT
DESCRIPTORS: ACHING;SORE;STABBING
DESCRIPTORS: ACHING;SORE
DESCRIPTORS: THROBBING;SPASM;SHARP
DESCRIPTORS: DISCOMFORT
DESCRIPTORS: ACHING;SORE;THROBBING;TIGHTNESS
DESCRIPTORS: STABBING;SHARP;THROBBING

## 2023-11-13 NOTE — PROGRESS NOTES
Orthopedic Progress Note    Patient:  Yeyo Morales  YOB: 1980     39 y.o. female    Subjective:  Patient seen and examined. Sleeping comfortably in bed in no acute distress  No complaints or concerns  No acute issues overnight  Pain controlled on current regimen  Denies fever, HA, CP, SOB, N/V, dysuria  Bowel movement since surgery. Passing flatus  Unable to work with PT over the weekend due to low hemoglobin    Vitals reviewed    Objective:   Vitals:    11/13/23 0431   BP:    Pulse:    Resp: 16   Temp:    SpO2:        Gen: NAD, cooperative    Cardiovascular: Regular rate    Respiratory: Chest symmetric, no accessory muscle use, normal respirations, no audible wheezes    MSK: LLE: Optifoam dressing dressing with strikethrough less than quarter size about the length of the incision. Hemovac drain intact. 35 cc out of serosanguineous drainage since 7:00 PM last night. Compartments soft and easily compressible. Unable to extend EHL which is baseline. FHL/TA/GS complex motor intact. Sural/saphenous/SPN/DPN/plantar nerve distribution SILT. The foot is warm and well-perfused with BCR. Recent Labs     11/11/23  0915 11/11/23  2037 11/12/23  1834   WBC 8.9  --   --    HGB 7.8*   < > 9.4*   HCT 25.9*   < > 33.7*     --   --    *  --   --    K 4.1  --   --    BUN 12  --   --    CREATININE 0.4*  --   --    GLUCOSE 315*  --   --     < > = values in this interval not displayed. DVT ppx: Fondaparinux  Abx: Rocephin and rifampin per ID recs    See rec for complete list    Impression/plan: 39 y.o. female, being seen for:    DOS: 11/7/23   -Removal of left hip prosthesis with placement of antibiotic spacer. -Irrigation and excisional debridement of skin down to including the bone of 10 x 20 cm wound, left hip.    -Optifoam dressing on. Please maintain. Orthopedics to manage.  - Hemovac drain in.  35 cc's overnight.   Please monitor output each shift.   -Weightbearing as

## 2023-11-13 NOTE — PROGRESS NOTES
Physical Therapy  Facility/Department: [address] ORTHO/MED SURG  Physical Therapy daily treatment note    Name: Ashish Breaux  : 1980  MRN: [id number]  Date of Service: 2023        Discharge Recommendations:  Patient would benefit from continued therapy after discharge   PT Equipment Recommendations  Other: Pt has WC and will benefit from use as primary means of mobilty due to sevarity of pain and poor WB tolerance. Patient Diagnosis(es): There were no encounter diagnoses. 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 encounter, Constipation, Depression, Fibromyalgia, Genital herpes, unspecified, GERD (gastroesophageal reflux disease), H/O bariatric surgery, History of pulmonary embolism, Hx of blood clots, Hypertension, Lives in nursing home, Lumbosacral spondylosis without myelopathy, MDRO (multiple drug resistant organisms) resistance, Mobility impaired, MRSA (methicillin resistant staph aureus) culture positive, Muscle weakness, Obsessive-compulsive disorder, unspecified, Opioid abuse, in remission ([address]), Pernicious anemia, Polyneuropathy, unspecified, PTSD (post-traumatic stress disorder), Pulmonary embolism ([address]), Suicidal behavior, SVT (supraventricular tachycardia), Type 2 diabetes mellitus, with long-term current use of insulin ([address]), Under care of team, and Under care of team.  Past Surgical History:  has a past surgical history that includes Cholecystectomy; Liver Resection (); Tonsillectomy; Abdominal hernia repair (); Abdominal hernia repair (2014); Bariatric Surgery (2013); Dilation and curettage of uterus (); Finger amputation (Left, 2015); lymph node biopsy ();  Total abdominal hysterectomy w/ Within Functional Limits  Orientation Level: Oriented X4  Cognition  Overall Cognitive Status: WFL  Cognition Comment: Pt able to make needs known, pain is severe limiting all activity as tolerated     Objective   Pulse: 94  Heart Rate Source: Monitor  BP: (!) 105/54  BP Location: Right lower arm  BP Method: Automatic  Patient Position: Supine  MAP (Calculated): 71  Respirations: 19  SpO2: 100 %  O2 Device: None (Room air)     Observation/Palpation  Posture: Good  Observation: hemovac drain in place at left hip  Scar: surgical incision with dressing                       Bed mobility  Supine to Sit: Moderate assistance  Sit to Supine: Moderate assistance  Scooting: Moderate assistance  Bed Mobility Comments: increased time required secondary to pt report of increased pain with mobility, HHA for trunk progression and assistance required for management of R LE. Pt crying out in pain with mobility. Pt able to initiate movement  Transfers  Sit to Stand: Moderate Assistance;2 Person Assistance  Stand to Sit: Minimal Assistance  Bed to Chair: Moderate assistance;2 Person Assistance  Stand Pivot Transfers: Moderate Assistance  Comment: Pain is a barrier with all mobilty, pt able to initiate mobilty and make needs known ,Pt able to stand pivot to rigth with use of external support  Ambulation  Surface: Level tile  Device: Rolling Walker  Assistance: Moderate assistance  Gait Deviations: Shuffles (Poor standing tolerance, poor initiation of left LE)  Distance: sit to stand with min assist  The pt was unable to WB through left LE, able to shuffle rigth LE for stand pivot from alternating surface, taking 2-3 steps left x 5 tranfers  Comments: . Pt instructed in pacing, breathing and pacing activity with focus on UE WB completing transfer from bed <> standing, stand with walker <> WC , WC<>toilet and toilet to WC and return WC <> bed with mod A for safety and due to progressive pain.

## 2023-11-13 NOTE — PLAN OF CARE
Dressings over the left hip were removed this AM. Wound is well approximated without signs of  suture failure or dehiscence. There is minimal erythema, without fluctuance, edema, or gross signs of infection. Drain is well maintained with drain stitch in place. New optifoam dressings were applied. Patient tolerated the dressing change well.

## 2023-11-13 NOTE — PROGRESS NOTES
[facility]  Internal Medicine Teaching Residency Program  Inpatient Daily Progress Note  ______________________________________________________________________________    Patient: Mack Burgos  YOB: 1980   EDC:[id number]    Acct: [de-identified]     Room: [address] [place] [address]  Admit date: 11/7/2023  Today's date: 11/13/23  Number of days in the hospital: 6    SUBJECTIVE   Admitting Diagnosis: S/P total left hip arthroplasty  CC: Left hip pain  Pt examined at bedside. Chart & results reviewed. Afebrile. Tachycardic overnight, likely due to pain vs exertion. Distal hip drain in place, 110cc output. UOP 1.1/hr. On rocephin and rifampin until 11/25 for left hip prosthesis infection, MSSA positive. Uptrending  > 171. ROS:  Constitutional:  negative for chills, fevers, sweats  Respiratory:  negative for cough, dyspnea on exertion, hemoptysis, shortness of breath, wheezing  Cardiovascular:  negative for chest pain, chest pressure/discomfort, lower extremity edema, palpitations  Gastrointestinal:  negative for abdominal pain, constipation, diarrhea, nausea, vomiting  Neurological:  negative for dizziness, headache left hip pain  BRIEF HISTORY     The patient is a pleasant 39 y.o. female with a past medical history of DM2 on insulin, antiphospholipid syndrome on fondaparinux, bipolar 1, recurrent falls secondary to alcohol use, Painting's esophagus presents for removal of left hip prosthesis with placement of antibiotic spacer and incision and drainage of chronic left hip infection by orthopedic surgery. Patient sustained a left femoral neck fracture secondary to fall while intoxicated for which she underwent left hemiarthroplasty on 7/19. Surgery complicated by wound dehiscence and left hip abscess, culture positive for MSSA status post I&D. At that time, infection was determined to not involve the prosthetic joint.   Treated with Rocephin but due to \"BNP\" in the last 72 hours. PT/INR: No results for input(s): \"PROTIME\", \"INR\" in the last 72 hours. APTT: No results for input(s): \"APTT\" in the last 72 hours. CARDIAC ENZYMES: No results for input(s): \"CKMB\", \"CKMBINDEX\", \"TROPONINI\" in the last 72 hours. Invalid input(s): \"CKTOTAL;3\"  FASTING LIPID PANEL:  Lab Results   Component Value Date    CHOL 74 04/18/2023    HDL 36 (L) 04/18/2023    TRIG 97 04/18/2023     LIVER PROFILE: No results for input(s): \"AST\", \"ALT\", \"ALB\", \"BILIDIR\", \"BILITOT\", \"ALKPHOS\" in the last 72 hours. MICROBIOLOGY:   Lab Results   Component Value Date/Time    CULTURE (A) 10/24/2023 02:00 PM     STAPHYLOCOCCUS AUREUS LIGHT GROWTH This isolate is methicillin susceptible. CULTURE (A) 10/24/2023 02:00 PM     PREVOTELLA (BACTEROIDES) DISIENS LIGHT GROWTH BETA LACTAMASE POSITIVE       Imaging:    XR PELVIS (1-2 VIEWS)    Result Date: 11/7/2023  Interval revision of the left hip hemiarthroplasty with placement of a long stem femoral component and through 5 cerclage wires. There is a new transverse nondisplaced and possibly incomplete fracture of the femoral shaft proximally between the 2 most inferior cerclage wires. XR FEMUR LEFT (MIN 2 VIEWS)    Result Date: 11/7/2023  Interval revision of the left hip hemiarthroplasty with placement of a long stem femoral component and through 5 cerclage wires. There is a new transverse nondisplaced and possibly incomplete fracture of the femoral shaft proximally between the 2 most inferior cerclage wires.        ASSESSMENT & PLAN     ASSESSMENT / PLAN:     Prosthetic joint infection              -Left femoral neck fracture status post hemiarthroplasty on 9/01 complicated by wound dehiscence/left hip abscess culture positive for MSSA status post I&D x2              -11/7 removal of left hip prosthesis with placement of antibiotic spacer and I&D              -Follows with infectious disease outpatient.      -Infectious disease consulted

## 2023-11-13 NOTE — PROGRESS NOTES
Comprehensive Nutrition Assessment    Type and Reason for Visit:  RD Nutrition Re-Screen/LOS    Nutrition Recommendations/Plan:   Continue current diet, Regular  Will send Diabetic ONS BID  Monitor/encourage PO intake     Malnutrition Assessment:  Malnutrition Status: At risk for malnutrition (Comment) (11/13/23 8441)    Context:  Chronic Illness     Findings of the 6 clinical characteristics of malnutrition:  Energy Intake:  Mild decrease in energy intake (Comment)  Weight Loss:  Greater than 10% over 6 months     Body Fat Loss:  Unable to assess     Muscle Mass Loss:  Unable to assess    Fluid Accumulation:  Mild     Strength:  Not Performed    Nutrition Assessment:    Chart review for LOS. 38 yo F adm s/p L hip spacer explant, replace deput spacer (11/7). PMH significant for alcohol use disorder, GERD, HTN, T2DM. Pt known to clinical nutrition from recent admission, known to like GLucerna (no chocolate). Per chart PO intake 51-75%. No BM noted, bowel regimine ordered, pt refusing administration. Pt noted with 10.1% wt loss x 6 mos, if stated admission wt is accurate. No measured wt this admission, ordered. Nutrition Related Findings:    labs/meds reviewed Wound Type: Surgical Incision (to L hip)       Current Nutrition Intake & Therapies:    Average Meal Intake: 51-75%  Average Supplements Intake: None Ordered  ADULT DIET; Regular  ADULT ORAL NUTRITION SUPPLEMENT; Breakfast, Dinner; Diabetic Oral Supplement    Anthropometric Measures:  Height: 172.7 cm (5' 7.99\")  Ideal Body Weight (IBW): 140 lbs (64 kg)       Current Body Weight: 111.1 kg (244 lb 14.9 oz), 175 % IBW.     Current BMI (kg/m2): 37.3  Usual Body Weight: 123.6 kg (272 lb 7.8 oz) (5/6/23)  % Weight Change (Calculated): -10.1  Weight Adjustment For: No Adjustment                 BMI Categories: Obese Class 2 (BMI 35.0 -39.9)    Estimated Daily Nutrient Needs:  Energy Requirements Based On: Formula  Weight Used for Energy Requirements:

## 2023-11-13 NOTE — PLAN OF CARE
Problem: Discharge Planning  Goal: Discharge to home or other facility with appropriate resources  11/13/2023 1612 by Nirmala Ramos RN  Outcome: Progressing  11/13/2023 0600 by Genevieve Jovel RN  Outcome: Progressing     Problem: Chronic Conditions and Co-morbidities  Goal: Patient's chronic conditions and co-morbidity symptoms are monitored and maintained or improved  11/13/2023 1612 by Nirmala Ramos RN  Outcome: Progressing  11/13/2023 0600 by Genevieve Jovel RN  Outcome: Progressing     Problem: Pain  Goal: Verbalizes/displays adequate comfort level or baseline comfort level  11/13/2023 1612 by Nirmala Ramos RN  Outcome: Progressing  11/13/2023 0600 by Genevieve Jovel RN  Outcome: Progressing     Problem: Safety - Adult  Goal: Free from fall injury  11/13/2023 1612 by Nirmala Ramos RN  Outcome: Progressing  11/13/2023 0600 by Genevieve Jovel RN  Outcome: Progressing     Problem: Nutrition Deficit:  Goal: Optimize nutritional status  Outcome: Progressing

## 2023-11-13 NOTE — PROGRESS NOTES
[facility] - Progress Note      Today's Date and Time: 11/13/2023, 3:28 PM    Impression :   S/p left hip cecilio arthroplasty 7-19-23  Left hip abscess/surgical wound dehiscence. Deep open wound - cx MSSA  Post initial I&D 8/21 then dressing change 8/24   Residual Left hip fluid collection s/p I&D with secondary closure 10/24/2023  Wound culture 10/24/2023 growing Staph aureus  MSSA  S/p removal of left hip prosthesis with placement of antibiotic spacer with irrigation and excisional debridement on 11/7/23    Antiphospholipid syndrome  Alcohol abuse with alcoholic hepatitis without ascites  Supraventricular tachycardia  Anxiety  Bipolar disorder    Recommendations:     Continue Rocephin 2 gm IV q 24 hr for a total of 4 weeks. Stop date 12-7-23  Rifampin 600 mg po daily for synergy against S aureus until 12/7/23  Wound culture 10/24/2023 Staph aureus- MSSA  Wound Care       Medical Decision Making/Summary/Discussion:11/13/2023     Patient with left hip cecilio arthroplasty 7-19-23  Subsequent Left hip abscess and surgical wound dehiscence on 8-21-23  Deep open wound - cx MSSA  Post initial I&D 8/21 then dressing change 8/24   S/P course of ceftriaxone as an outpatient  Persistent wound drainage leading to new I&D and wound closure on 10-24-23  Wound culture with S aureus  Ceftriaxone continued. Rifampin added for synergy given prior lack of response with Ceftriaxone alone.   Infection Control Recommendations   [place] Precautions    Antimicrobial Stewardship Recommendations     Simplification of therapy  Targeted therapy    Coordination of Outpatient Care:   Estimated Length of IV antimicrobials:11-25-23  Patient will need Midline Catheter Insertion: TBD  Patient will need PICC line Insertion:TBD  Patient will need: Home IV , [address] [place],  [facility],  LTAC: TBD  Patient will need outpatient wound care: Yes    Chief complaint/reason for consultation:   Hip infection      History of fondaparinux  10 mg SubCUTAneous Daily    sodium chloride flush  5-40 mL IntraVENous 2 times per day    cyclobenzaprine  10 mg Oral Q6H    acetaminophen  1,000 mg Oral 4 times per day       Social History:     Social History     Socioeconomic History    Marital status: Legally      Spouse name: Jesus Oropeza    Number of children: 1    Years of education: 3 years of college    Highest education level: Not on file   Occupational History    Occupation: infant care     Comment: last worked 2008   Tobacco Use    Smoking status: Never    Smokeless tobacco: Never   Vaping Use    Vaping Use: Never used   Substance and Sexual Activity    Alcohol use: Not Currently     Alcohol/week: 20.0 standard drinks of alcohol     Types: 20 Shots of liquor per week    Drug use: No    Sexual activity: Yes     Partners: Male   Other Topics Concern    Not on file   Social History Narrative    Lives with Caryl Maurice ex  and daughter          Social Determinants of Health     Financial Resource Strain: Not on file   Food Insecurity: Not on file   Transportation Needs: Not on file   Physical Activity: Not on file   Stress: Not on file   Social Connections: Not on file   Intimate Partner Violence: Not on file   Housing Stability: Not on file       Family History:     Family History   Problem Relation Age of Onset    Depression Mother     High Blood Pressure Mother     High Cholesterol Mother     Diabetes Father     Heart Disease Father     Kidney Disease Father     High Blood Pressure Father     High Cholesterol Father     Stroke Father     No Known Problems Brother     Breast Cancer Maternal Aunt     Cancer Maternal Uncle         of duodenum had whipple    Breast Cancer Maternal Cousin         Allergies:   Aripiprazole, Aspirin, Betadine [povidone iodine], Celebrex [celecoxib], Celexa [citalopram hydrobromide], Citalopram, Furosemide, Nitrofurantoin, Codeine, Lithium, Morphine, Paroxetine, Paxil [paroxetine hcl], Povidone iodine,

## 2023-11-14 LAB
CRP SERPL HS-MCNC: 177.3 MG/L (ref 0–5)
GLUCOSE BLD-MCNC: 130 MG/DL (ref 65–105)
GLUCOSE BLD-MCNC: 219 MG/DL (ref 65–105)
GLUCOSE BLD-MCNC: 256 MG/DL (ref 65–105)
GLUCOSE BLD-MCNC: 78 MG/DL (ref 65–105)

## 2023-11-14 PROCEDURE — 36415 COLL VENOUS BLD VENIPUNCTURE: CPT

## 2023-11-14 PROCEDURE — 99232 SBSQ HOSP IP/OBS MODERATE 35: CPT | Performed by: INTERNAL MEDICINE

## 2023-11-14 PROCEDURE — 6370000000 HC RX 637 (ALT 250 FOR IP)

## 2023-11-14 PROCEDURE — 1200000000 HC SEMI PRIVATE

## 2023-11-14 PROCEDURE — 97116 GAIT TRAINING THERAPY: CPT

## 2023-11-14 PROCEDURE — 6360000002 HC RX W HCPCS: Performed by: NURSE PRACTITIONER

## 2023-11-14 PROCEDURE — 97530 THERAPEUTIC ACTIVITIES: CPT

## 2023-11-14 PROCEDURE — 82947 ASSAY GLUCOSE BLOOD QUANT: CPT

## 2023-11-14 PROCEDURE — 86140 C-REACTIVE PROTEIN: CPT

## 2023-11-14 PROCEDURE — 6370000000 HC RX 637 (ALT 250 FOR IP): Performed by: NURSE PRACTITIONER

## 2023-11-14 PROCEDURE — 6360000002 HC RX W HCPCS

## 2023-11-14 PROCEDURE — 6370000000 HC RX 637 (ALT 250 FOR IP): Performed by: STUDENT IN AN ORGANIZED HEALTH CARE EDUCATION/TRAINING PROGRAM

## 2023-11-14 PROCEDURE — 2580000003 HC RX 258

## 2023-11-14 RX ADMIN — LOSARTAN POTASSIUM 50 MG: 50 TABLET, FILM COATED ORAL at 08:31

## 2023-11-14 RX ADMIN — Medication 6 MG: at 22:30

## 2023-11-14 RX ADMIN — INSULIN GLARGINE 10 UNITS: 100 INJECTION, SOLUTION SUBCUTANEOUS at 21:15

## 2023-11-14 RX ADMIN — FONDAPARINUX SODIUM 10 MG: 5 INJECTION, SOLUTION SUBCUTANEOUS at 10:29

## 2023-11-14 RX ADMIN — PRAVASTATIN SODIUM 40 MG: 20 TABLET ORAL at 21:15

## 2023-11-14 RX ADMIN — VALACYCLOVIR HYDROCHLORIDE 500 MG: 500 TABLET, FILM COATED ORAL at 08:30

## 2023-11-14 RX ADMIN — GABAPENTIN 100 MG: 100 CAPSULE ORAL at 08:32

## 2023-11-14 RX ADMIN — LORAZEPAM 0.5 MG: 0.5 TABLET ORAL at 13:37

## 2023-11-14 RX ADMIN — SODIUM CHLORIDE, PRESERVATIVE FREE 10 ML: 5 INJECTION INTRAVENOUS at 21:16

## 2023-11-14 RX ADMIN — OXYCODONE 10 MG: 5 TABLET ORAL at 22:29

## 2023-11-14 RX ADMIN — LORAZEPAM 0.5 MG: 0.5 TABLET ORAL at 05:04

## 2023-11-14 RX ADMIN — BACLOFEN 20 MG: 10 TABLET ORAL at 08:32

## 2023-11-14 RX ADMIN — GABAPENTIN 100 MG: 100 CAPSULE ORAL at 13:37

## 2023-11-14 RX ADMIN — HYDROMORPHONE HYDROCHLORIDE 0.5 MG: 1 INJECTION, SOLUTION INTRAMUSCULAR; INTRAVENOUS; SUBCUTANEOUS at 17:09

## 2023-11-14 RX ADMIN — CYCLOBENZAPRINE 10 MG: 10 TABLET, FILM COATED ORAL at 16:07

## 2023-11-14 RX ADMIN — LORAZEPAM 0.5 MG: 0.5 TABLET ORAL at 21:15

## 2023-11-14 RX ADMIN — BACLOFEN 20 MG: 10 TABLET ORAL at 21:14

## 2023-11-14 RX ADMIN — ACETAMINOPHEN 1000 MG: 500 TABLET ORAL at 06:34

## 2023-11-14 RX ADMIN — HYDROMORPHONE HYDROCHLORIDE 0.5 MG: 1 INJECTION, SOLUTION INTRAMUSCULAR; INTRAVENOUS; SUBCUTANEOUS at 13:33

## 2023-11-14 RX ADMIN — CYCLOBENZAPRINE 10 MG: 10 TABLET, FILM COATED ORAL at 22:30

## 2023-11-14 RX ADMIN — HYDROMORPHONE HYDROCHLORIDE 0.5 MG: 1 INJECTION, SOLUTION INTRAMUSCULAR; INTRAVENOUS; SUBCUTANEOUS at 09:26

## 2023-11-14 RX ADMIN — SODIUM CHLORIDE, PRESERVATIVE FREE 10 ML: 5 INJECTION INTRAVENOUS at 08:33

## 2023-11-14 RX ADMIN — HYDROMORPHONE HYDROCHLORIDE 0.5 MG: 1 INJECTION, SOLUTION INTRAMUSCULAR; INTRAVENOUS; SUBCUTANEOUS at 05:04

## 2023-11-14 RX ADMIN — Medication 2000 MG: at 16:06

## 2023-11-14 RX ADMIN — METOPROLOL TARTRATE 25 MG: 25 TABLET ORAL at 08:30

## 2023-11-14 RX ADMIN — OXYCODONE 10 MG: 5 TABLET ORAL at 14:28

## 2023-11-14 RX ADMIN — INSULIN LISPRO 4 UNITS: 100 INJECTION, SOLUTION INTRAVENOUS; SUBCUTANEOUS at 12:45

## 2023-11-14 RX ADMIN — ACETAMINOPHEN 1000 MG: 500 TABLET ORAL at 12:08

## 2023-11-14 RX ADMIN — FLUOXETINE HYDROCHLORIDE 40 MG: 20 CAPSULE ORAL at 08:31

## 2023-11-14 RX ADMIN — OXYCODONE 10 MG: 5 TABLET ORAL at 06:34

## 2023-11-14 RX ADMIN — HYDROMORPHONE HYDROCHLORIDE 0.5 MG: 1 INJECTION, SOLUTION INTRAMUSCULAR; INTRAVENOUS; SUBCUTANEOUS at 21:15

## 2023-11-14 RX ADMIN — CYCLOBENZAPRINE 10 MG: 10 TABLET, FILM COATED ORAL at 10:30

## 2023-11-14 RX ADMIN — ACETAMINOPHEN 1000 MG: 500 TABLET ORAL at 18:30

## 2023-11-14 RX ADMIN — RIFAMPIN 600 MG: 300 CAPSULE ORAL at 08:33

## 2023-11-14 RX ADMIN — OXYCODONE 10 MG: 5 TABLET ORAL at 10:29

## 2023-11-14 RX ADMIN — Medication 100 MG: at 08:30

## 2023-11-14 RX ADMIN — BUPROPION HYDROCHLORIDE 150 MG: 150 TABLET, EXTENDED RELEASE ORAL at 08:32

## 2023-11-14 RX ADMIN — ACETAMINOPHEN 1000 MG: 500 TABLET ORAL at 23:56

## 2023-11-14 RX ADMIN — ALCOHOL 1 TABLET: 70.47 GEL TOPICAL at 08:30

## 2023-11-14 RX ADMIN — OXYCODONE 10 MG: 5 TABLET ORAL at 18:29

## 2023-11-14 RX ADMIN — GABAPENTIN 100 MG: 100 CAPSULE ORAL at 21:15

## 2023-11-14 RX ADMIN — FOLIC ACID 1 MG: 1 TABLET ORAL at 08:31

## 2023-11-14 ASSESSMENT — PAIN SCALES - GENERAL
PAINLEVEL_OUTOF10: 7
PAINLEVEL_OUTOF10: 9
PAINLEVEL_OUTOF10: 8
PAINLEVEL_OUTOF10: 7
PAINLEVEL_OUTOF10: 8
PAINLEVEL_OUTOF10: 9
PAINLEVEL_OUTOF10: 8
PAINLEVEL_OUTOF10: 10
PAINLEVEL_OUTOF10: 10
PAINLEVEL_OUTOF10: 8

## 2023-11-14 NOTE — PROGRESS NOTES
Orthopedic Progress Note    Patient:  Brad Moralez  YOB: 1980     39 y.o. female    Subjective:  Patient seen and examined. Sleeping comfortably in bed in no acute distress  No complaints or concerns  No acute issues overnight  Pain controlled on current regimen  Denies fever, HA, CP, SOB, N/V, dysuria  Bowel movement yesterday. Able to ambulate to the bathroom yesterday. Vitals reviewed    Objective:   Vitals:    11/14/23 0010   BP:    Pulse:    Resp: 16   Temp:    SpO2:        Gen: NAD, cooperative    Cardiovascular: Regular rate    Respiratory: Chest symmetric, no accessory muscle use, normal respirations, no audible wheezes    MSK: LLE: Optifoam dressing with some saturation to the superior 1/3 of the dressings. Hemovac drain intact. 5 cc's out of serosanguineous drainage since yesterday evening. Compartments soft and easily compressible. Unable to extend EHL which is baseline. FHL/TA/GS complex motor intact. Sural/saphenous/SPN/DPN/plantar nerve distribution SILT. The foot is warm and well-perfused with BCR. Recent Labs     11/11/23  0915 11/11/23  2037 11/13/23  1129   WBC 8.9  --  9.0   HGB 7.8*   < > 10.0*   HCT 25.9*   < > 31.8*     --  199   *  --   --    K 4.1  --   --    BUN 12  --   --    CREATININE 0.4*  --   --    GLUCOSE 315*  --   --     < > = values in this interval not displayed. DVT ppx: Fondaparinux  Abx: Rocephin and rifampin per ID recs    See rec for complete list    Impression/plan: 39 y.o. female, being seen for:    DOS: 11/7/23   -Removal of left hip prosthesis with placement of antibiotic spacer. -Irrigation and excisional debridement of skin down to including the bone of 10 x 20 cm wound, left hip.    -Optifoam dressing on. Please maintain. Please message if dressings are saturating.  -Hemovac drain pulled this am.  5 cc's overnight.   -Weightbearing as tolerated to left lower extremity  -Pain control PO/IV Medication.

## 2023-11-14 NOTE — PROGRESS NOTES
Physical Therapy  Facility/Department: [address] ORTHO/MED SURG  Physical Therapy Initial Assessment    Name: Julian Soriano  : 1980  MRN: [id number]  Date of Service: 2023    Discharge Recommendations:  Patient would benefit from continued therapy after discharge   PT Equipment Recommendations  Equipment Needed: No      Patient Diagnosis(es): There were no encounter diagnoses. 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 encounter, Constipation, Depression, Fibromyalgia, Genital herpes, unspecified, GERD (gastroesophageal reflux disease), H/O bariatric surgery, History of pulmonary embolism, Hx of blood clots, Hypertension, Lives in nursing home, Lumbosacral spondylosis without myelopathy, MDRO (multiple drug resistant organisms) resistance, Mobility impaired, MRSA (methicillin resistant staph aureus) culture positive, Muscle weakness, Obsessive-compulsive disorder, unspecified, Opioid abuse, in remission ([address]), Pernicious anemia, Polyneuropathy, unspecified, PTSD (post-traumatic stress disorder), Pulmonary embolism ([address]), Suicidal behavior, SVT (supraventricular tachycardia), Type 2 diabetes mellitus, with long-term current use of insulin ([address]), Under care of team, and Under care of team.  Past Surgical History:  has a past surgical history that includes Cholecystectomy; Liver Resection (); Tonsillectomy; Abdominal hernia repair (); Abdominal hernia repair (2014); Bariatric Surgery (2013); Dilation and curettage of uterus (); Finger amputation (Left, 2015); lymph node biopsy (); Total abdominal hysterectomy w/ bilateral salpingoophorectomy (); IVC filter insertion; hip surgery (Left);  Ankle fracture surgery (Left); hc cath power picc single (2023); Ankle fracture surgery (Left, 10/24/2023); Esophagogastroduodenoscopy (2021); Esophagogastroduodenoscopy (2013); laparoscopy (2013); Ankle surgery (Left, 2019);  section; hip surgery (Left, 2023); and hip surgery (Left, 2023). Assessment   Body Structures, Functions, Activity Limitations Requiring Skilled Therapeutic Intervention: Decreased functional mobility ; Decreased strength;Decreased endurance; Increased pain;Decreased balance  Assessment: Pt with significant mobility deficits requiring mod-A x2 to perform a stand-pivot transfers x4. Pt complains of significant pain throughout today's session. Pt declining to attempt other therapeutic activities and exercises apart from stand-pivot transfers. Pt would benefit from additional PT upon discharge to maximize functional independence. Pt will require 24 hour assistance with mobility upon discharge.   Therapy Prognosis: Good  Requires PT Follow-Up: Yes  Activity Tolerance  Activity Tolerance: Patient limited by pain     Plan   Physical Therapy Plan  General Plan:  (5-6x/wk)  Current Treatment Recommendations: Strengthening, Functional mobility training, Endurance training, Transfer training, Gait training, Stair training, Pain management, Safety education & training, Therapeutic activities, Balance training, Patient/Caregiver education & training, Equipment evaluation, education, & procurement, Home exercise program  Safety Devices  Type of Devices: Call light within reach, Gait belt, Patient at risk for falls, Left in bed, Nurse notified, All fall risk precautions in place  Restraints  Restraints Initially in Place: No     Restrictions  Restrictions/Precautions  Restrictions/Precautions: Weight Bearing, General Precautions, Contact Precautions, Up as Tolerated  Required Braces or Orthoses?: No  Lower Extremity Weight Bearing Restrictions  Left Lower Extremity Weight Bearing: Curettage Text: The wound bed was treated with curettage after the biopsy was performed.

## 2023-11-14 NOTE — PROGRESS NOTES
[facility] - Progress Note      Today's Date and Time: 11/14/2023, 6:58 AM    Impression :   S/p left hip cecilio arthroplasty 7-19-23  Left hip abscess/surgical wound dehiscence. Deep open wound - cx MSSA  Post initial I&D 8/21 then dressing change 8/24   Residual Left hip fluid collection s/p I&D with secondary closure 10/24/2023  Wound culture 10/24/2023 growing Staph aureus  MSSA  S/p removal of left hip prosthesis with placement of antibiotic spacer with irrigation and excisional debridement on 11/7/23    Antiphospholipid syndrome  Alcohol abuse with alcoholic hepatitis without ascites  Supraventricular tachycardia  Anxiety  Bipolar disorder    Recommendations:     Continue Rocephin 2 gm IV q 24 hr for a total of 4 weeks. Stop date 12-7-23  Rifampin 600 mg po daily for synergy against S aureus until 12/7/23  Wound culture 10/24/2023 Staph aureus- MSSA  Wound Care       Medical Decision Making/Summary/Discussion:11/14/2023     Patient with left hip cecilio arthroplasty 7-19-23  Subsequent Left hip abscess and surgical wound dehiscence on 8-21-23  Deep open wound - cx MSSA  Post initial I&D 8/21 then dressing change 8/24   S/P course of ceftriaxone as an outpatient  Persistent wound drainage leading to new I&D and wound closure on 10-24-23  Wound culture with S aureus  Ceftriaxone continued. Rifampin added for synergy given prior lack of response with Ceftriaxone alone.   Infection Control Recommendations   [place] Precautions    Antimicrobial Stewardship Recommendations     Simplification of therapy  Targeted therapy    Coordination of Outpatient Care:   Estimated Length of IV antimicrobials:11-25-23  Patient will need Midline Catheter Insertion: TBD  Patient will need PICC line Insertion:TBD  Patient will need: Home IV , [address] [place],  [facility],  LTAC: TBD  Patient will need outpatient wound care: Yes    Chief complaint/reason for consultation:   Hip infection      History of listed below as part of the medical decision making and evaluation. Examined patient. Discussed with patient  Patient follows in our office  She is feeling OK after surgery with explantation of infected prosthesis  Pain still present  Receiving transfusion  Discussed with referring physician or service  Orthopedics  Labs, medications, radiologic studies were reviewed with personal review of films  Radiologic studies  Lab work  Cultures  Large amounts of data were reviewed  Discussed with nursing Staff, Discharge planner  Infection Control and Prevention measures reviewed  Contact isolation  All prior entries were reviewed  Reviewed Administration of medications as ordered  Established Prognosis: Guarded    Discharge planning reviewed  Reviewed need for follow up as outpatient.   Follow up in office    Herbert Brannon MD.

## 2023-11-14 NOTE — PROGRESS NOTES
[facility] - Progress Note      Today's Date and Time: 11/14/2023, 11:32 AM    Impression :   S/p left hip cecilio arthroplasty 7-19-23  Left hip abscess/surgical wound dehiscence. Deep open wound - cx MSSA  Post initial I&D 8/21 then dressing change 8/24   Residual Left hip fluid collection s/p I&D with secondary closure 10/24/2023  Wound culture 10/24/2023 growing Staph aureus  MSSA  S/p removal of left hip prosthesis with placement of antibiotic spacer with irrigation and excisional debridement on 11/7/23    Antiphospholipid syndrome  Alcohol abuse with alcoholic hepatitis without ascites  Supraventricular tachycardia  Anxiety  Bipolar disorder    Recommendations:     Continue Rocephin 2 gm IV q 24 hr for a total of 4 weeks. Stop date 12-7-23  Rifampin 600 mg po daily for synergy against S aureus until 12/7/23  Wound culture 10/24/2023 Staph aureus- MSSA  Wound Care       Medical Decision Making/Summary/Discussion:11/14/2023     Patient with left hip cecilio arthroplasty 7-19-23  Subsequent Left hip abscess and surgical wound dehiscence on 8-21-23  Deep open wound - cx MSSA  Post initial I&D 8/21 then dressing change 8/24   S/P course of ceftriaxone as an outpatient  Persistent wound drainage leading to new I&D and wound closure on 10-24-23  Wound culture with S aureus  Ceftriaxone continued. Rifampin added for synergy given prior lack of response with Ceftriaxone alone.   Infection Control Recommendations   [place] Precautions    Antimicrobial Stewardship Recommendations     Simplification of therapy  Targeted therapy    Coordination of Outpatient Care:   Estimated Length of IV antimicrobials:11-25-23  Patient will need Midline Catheter Insertion: TBD  Patient will need PICC line Insertion:TBD  Patient will need: Home IV , [address] [place],  [facility],  LTAC: TBD  Patient will need outpatient wound care: Yes    Chief complaint/reason for consultation:   Hip infection      History of

## 2023-11-14 NOTE — PLAN OF CARE
Problem: Discharge Planning  Goal: Discharge to home or other facility with appropriate resources  11/14/2023 0557 by Marie Munguia RN  Outcome: Progressing  11/13/2023 1612 by Michael Donohue RN  Outcome: Progressing     Problem: Chronic Conditions and Co-morbidities  Goal: Patient's chronic conditions and co-morbidity symptoms are monitored and maintained or improved  11/14/2023 0557 by Marie Munguia RN  Outcome: Progressing  11/13/2023 1612 by Michael Donohue RN  Outcome: Progressing     Problem: Pain  Goal: Verbalizes/displays adequate comfort level or baseline comfort level  11/14/2023 0557 by Marie Munguia RN  Outcome: Progressing  11/13/2023 1612 by Michael Donohue RN  Outcome: Progressing     Problem: Safety - Adult  Goal: Free from fall injury  11/14/2023 0557 by Marie Munguia RN  Outcome: Progressing  11/13/2023 1612 by Michael Donohue RN  Outcome: Progressing     Problem: Nutrition Deficit:  Goal: Optimize nutritional status  11/14/2023 0557 by Mraie Munguia RN  Outcome: Progressing  11/13/2023 1612 by Michael Donohue RN  Outcome: Progressing

## 2023-11-14 NOTE — PLAN OF CARE
Problem: Discharge Planning  Goal: Discharge to home or other facility with appropriate resources  11/14/2023 1632 by Martina Marshall RN  Outcome: Progressing     Problem: Chronic Conditions and Co-morbidities  Goal: Patient's chronic conditions and co-morbidity symptoms are monitored and maintained or improved  11/14/2023 1632 by Martina Marshall RN  Outcome: Progressing     Problem: Pain  Goal: Verbalizes/displays adequate comfort level or baseline comfort level  11/14/2023 1632 by Martina Marshall RN  Outcome: Progressing

## 2023-11-15 ENCOUNTER — TELEPHONE (OUTPATIENT)
Dept: ORTHOPEDIC SURGERY | Age: 43
End: 2023-11-15

## 2023-11-15 VITALS
HEART RATE: 101 BPM | HEIGHT: 68 IN | OXYGEN SATURATION: 98 % | RESPIRATION RATE: 17 BRPM | TEMPERATURE: 98.6 F | WEIGHT: 245 LBS | DIASTOLIC BLOOD PRESSURE: 64 MMHG | SYSTOLIC BLOOD PRESSURE: 106 MMHG | BODY MASS INDEX: 37.13 KG/M2

## 2023-11-15 DIAGNOSIS — T81.42XA DEEP INCISIONAL SURGICAL SITE INFECTION: Primary | ICD-10-CM

## 2023-11-15 LAB
GLUCOSE BLD-MCNC: 116 MG/DL (ref 65–105)
GLUCOSE BLD-MCNC: 120 MG/DL (ref 65–105)
GLUCOSE BLD-MCNC: 190 MG/DL (ref 65–105)

## 2023-11-15 PROCEDURE — 6370000000 HC RX 637 (ALT 250 FOR IP): Performed by: STUDENT IN AN ORGANIZED HEALTH CARE EDUCATION/TRAINING PROGRAM

## 2023-11-15 PROCEDURE — 82947 ASSAY GLUCOSE BLOOD QUANT: CPT

## 2023-11-15 PROCEDURE — 97116 GAIT TRAINING THERAPY: CPT

## 2023-11-15 PROCEDURE — 6360000002 HC RX W HCPCS

## 2023-11-15 PROCEDURE — 6370000000 HC RX 637 (ALT 250 FOR IP)

## 2023-11-15 PROCEDURE — 97530 THERAPEUTIC ACTIVITIES: CPT

## 2023-11-15 PROCEDURE — 2580000003 HC RX 258

## 2023-11-15 PROCEDURE — 99232 SBSQ HOSP IP/OBS MODERATE 35: CPT | Performed by: INTERNAL MEDICINE

## 2023-11-15 PROCEDURE — 97535 SELF CARE MNGMENT TRAINING: CPT

## 2023-11-15 PROCEDURE — 6360000002 HC RX W HCPCS: Performed by: NURSE PRACTITIONER

## 2023-11-15 PROCEDURE — 94760 N-INVAS EAR/PLS OXIMETRY 1: CPT

## 2023-11-15 PROCEDURE — 6370000000 HC RX 637 (ALT 250 FOR IP): Performed by: NURSE PRACTITIONER

## 2023-11-15 PROCEDURE — 97110 THERAPEUTIC EXERCISES: CPT

## 2023-11-15 RX ORDER — OXYCODONE HYDROCHLORIDE 5 MG/1
5 TABLET ORAL
Qty: 30 TABLET | Refills: 0 | Status: SHIPPED | OUTPATIENT
Start: 2023-11-15 | End: 2023-11-20

## 2023-11-15 RX ADMIN — ACETAMINOPHEN 1000 MG: 500 TABLET ORAL at 07:22

## 2023-11-15 RX ADMIN — SODIUM CHLORIDE, PRESERVATIVE FREE 10 ML: 5 INJECTION INTRAVENOUS at 01:13

## 2023-11-15 RX ADMIN — FOLIC ACID 1 MG: 1 TABLET ORAL at 09:23

## 2023-11-15 RX ADMIN — HYDROMORPHONE HYDROCHLORIDE 0.5 MG: 1 INJECTION, SOLUTION INTRAMUSCULAR; INTRAVENOUS; SUBCUTANEOUS at 01:13

## 2023-11-15 RX ADMIN — OXYCODONE 10 MG: 5 TABLET ORAL at 11:32

## 2023-11-15 RX ADMIN — SODIUM CHLORIDE, PRESERVATIVE FREE 10 ML: 5 INJECTION INTRAVENOUS at 05:15

## 2023-11-15 RX ADMIN — ACETAMINOPHEN 1000 MG: 500 TABLET ORAL at 12:58

## 2023-11-15 RX ADMIN — FLUOXETINE HYDROCHLORIDE 40 MG: 20 CAPSULE ORAL at 09:25

## 2023-11-15 RX ADMIN — FONDAPARINUX SODIUM 10 MG: 5 INJECTION, SOLUTION SUBCUTANEOUS at 09:24

## 2023-11-15 RX ADMIN — VALACYCLOVIR HYDROCHLORIDE 500 MG: 500 TABLET, FILM COATED ORAL at 09:26

## 2023-11-15 RX ADMIN — BUPROPION HYDROCHLORIDE 150 MG: 150 TABLET, EXTENDED RELEASE ORAL at 09:24

## 2023-11-15 RX ADMIN — LORAZEPAM 0.5 MG: 0.5 TABLET ORAL at 09:23

## 2023-11-15 RX ADMIN — Medication 2000 MG: at 15:33

## 2023-11-15 RX ADMIN — HYDROMORPHONE HYDROCHLORIDE 0.5 MG: 1 INJECTION, SOLUTION INTRAMUSCULAR; INTRAVENOUS; SUBCUTANEOUS at 17:52

## 2023-11-15 RX ADMIN — OXYCODONE 10 MG: 5 TABLET ORAL at 15:33

## 2023-11-15 RX ADMIN — OXYCODONE 10 MG: 5 TABLET ORAL at 07:26

## 2023-11-15 RX ADMIN — HYDROMORPHONE HYDROCHLORIDE 0.5 MG: 1 INJECTION, SOLUTION INTRAMUSCULAR; INTRAVENOUS; SUBCUTANEOUS at 05:15

## 2023-11-15 RX ADMIN — RIFAMPIN 600 MG: 300 CAPSULE ORAL at 09:24

## 2023-11-15 RX ADMIN — METOPROLOL TARTRATE 25 MG: 25 TABLET ORAL at 09:23

## 2023-11-15 RX ADMIN — HYDROMORPHONE HYDROCHLORIDE 0.5 MG: 1 INJECTION, SOLUTION INTRAMUSCULAR; INTRAVENOUS; SUBCUTANEOUS at 13:50

## 2023-11-15 RX ADMIN — Medication 100 MG: at 09:26

## 2023-11-15 RX ADMIN — BACLOFEN 20 MG: 10 TABLET ORAL at 09:24

## 2023-11-15 RX ADMIN — HYDROMORPHONE HYDROCHLORIDE 0.5 MG: 1 INJECTION, SOLUTION INTRAMUSCULAR; INTRAVENOUS; SUBCUTANEOUS at 09:22

## 2023-11-15 RX ADMIN — ALCOHOL 1 TABLET: 70.47 GEL TOPICAL at 09:23

## 2023-11-15 RX ADMIN — SODIUM CHLORIDE, PRESERVATIVE FREE 10 ML: 5 INJECTION INTRAVENOUS at 09:23

## 2023-11-15 RX ADMIN — GABAPENTIN 100 MG: 100 CAPSULE ORAL at 14:18

## 2023-11-15 RX ADMIN — CYCLOBENZAPRINE 10 MG: 10 TABLET, FILM COATED ORAL at 17:09

## 2023-11-15 RX ADMIN — OXYCODONE 10 MG: 5 TABLET ORAL at 03:26

## 2023-11-15 RX ADMIN — LOSARTAN POTASSIUM 50 MG: 50 TABLET, FILM COATED ORAL at 09:23

## 2023-11-15 RX ADMIN — CYCLOBENZAPRINE 10 MG: 10 TABLET, FILM COATED ORAL at 09:24

## 2023-11-15 RX ADMIN — GABAPENTIN 100 MG: 100 CAPSULE ORAL at 09:23

## 2023-11-15 RX ADMIN — CYCLOBENZAPRINE 10 MG: 10 TABLET, FILM COATED ORAL at 04:46

## 2023-11-15 ASSESSMENT — PAIN SCALES - GENERAL
PAINLEVEL_OUTOF10: 8
PAINLEVEL_OUTOF10: 7
PAINLEVEL_OUTOF10: 8
PAINLEVEL_OUTOF10: 8
PAINLEVEL_OUTOF10: 7
PAINLEVEL_OUTOF10: 8

## 2023-11-15 NOTE — PROGRESS NOTES
RN attempted to call report to [facility] again. No answer.

## 2023-11-15 NOTE — CARE COORDINATION
Transitional planning      1130 received d/c order, writer to room to discuss plan with patient , plan is to return to [facility], call to [facility], spoke with gary Gee to return, is a bedhold. Transport request for 1800 secured via [employer], spoke with Candice Ryder. PS sent to MD to complete HONG. Primary RN provided number for report.

## 2023-11-15 NOTE — TELEPHONE ENCOUNTER
Called and notified pts mother. Provided office contact information for her to call and be scheduled upon discharge.

## 2023-11-15 NOTE — DISCHARGE INSTR - COC
disease) K21.9    Alcohol dependence in remission ([address]) F10.21    Type 2 diabetes mellitus with diabetic polyneuropathy, with long-term current use of insulin (HCC) E11.42, Z79.4    Depression with suicidal ideation F32. A, R45.851    Primary insomnia F51.01    Essential hypertension I10    Bipolar affective disorder in remission ([address]) F31.70    History of pulmonary embolism Z86.711    Antiphospholipid syndrome (HCC) D68.61    Alcohol intoxication ([address]) F10.929    Hypertriglyceridemia E78.1    Chronic post-traumatic stress disorder (PTSD) F43.12    OCD (obsessive compulsive disorder) F42.9    Severe benzodiazepine use disorder (HCC) F13.20    Obesity, Class III, BMI 40-49.9 (morbid obesity) (HCC) E66.01    History of MRSA infection Z86.14    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    NAFLD (nonalcoholic fatty liver disease) K76.0    Nondependent opioid abuse in remission ([address]) F11.11    History of Awa-en-Y gastric bypass Z98.84    Herpes genitalis A60.00    History of drug abuse ([facility]) F19.11    Malabsorption K90.9    History of pulmonary embolus (PE) Z86.711    Vitamin B 12 deficiency E53.8    Vitamin D deficiency E55.9    History of surgery of liver Z98.890    Blood alkaline phosphatase increased compared with prior measurement R74.8    SVT (supraventricular tachycardia) I47.10    Anxiety F41.9    Chronic idiopathic constipation K59.04    Recurrent incisional hernia K43.2    History of small bowel obstruction Z87.19    History of peptic ulcer Z87.11    Fibromyalgia M79.7    Polyneuropathy G62.9    COVID-19 virus infection U07.1    Acute pancreatitis K85.90    Multifocal pneumonia K18.9    Alcoholic hepatitis J49.31    Acute alcoholic intoxication without complication (HCC) G91.647    Acute alcohol intoxication with alcoholism, uncomplicated (HCC) E86.006    Alcohol withdrawal syndrome with complication ([address]) C76.600    Accident due to mechanical fall without injury W19. XXXA    Thoracic back pain M54.6    Floaters in visual field, Assisted pullover  Bathing  Assisted  Dressing  Assisted  Toileting  Assisted briefed and purwick; would stand and pivot for bowel movement to bedside commode  Feeding  Independent  Med Admin  Independent  Med Delivery   whole    Wound Care Documentation and Therapy:  Negative Pressure Wound Therapy Leg Left;Proximal;Upper (Active)   Wound Type Surgical 11/15/23 0830   Dressing Type Other (Comment) 11/15/23 0830   Cycle Continuous 11/15/23 0830   Target Pressure (mmHg) 125 11/15/23 0830   Dressing Status Clean, dry & intact 11/15/23 0830   Drainage Amount Small 11/15/23 0830   Drainage Description Serosanguinous 11/15/23 0830   Wound Assessment Other (Comment) 11/15/23 0830   Valerie-wound Assessment Other (Comment) 11/15/23 0830   Number of days: 0       Incision 07/19/23 Hip Left;Lateral (Active)   Number of days: 118       Incision 08/21/23 Femoral Left (Active)   Number of days: 85       Incision 10/24/23 Hip Left;Proximal (Active)   Dressing Status Dry; Intact; Old drainage noted 11/15/23 0830   Dressing/Treatment Foam 11/15/23 0830   Closure Other (Comment) 11/15/23 0830   Incision Assessment Other (Comment) 11/15/23 0830   Drainage Amount Moderate (25-50%) 11/15/23 0830   Drainage Description Serosanguinous 11/15/23 0830   Odor None 10/24/23 1900   Valerie-incision Assessment Other (Comment) 11/15/23 0830   Number of days: 21       Incision 10/24/23 Thigh Left;Proximal (Active)   Dressing Status Dry; Intact; Old drainage noted 11/15/23 0830   Dressing/Treatment Foam 11/15/23 0830   Closure Other (Comment) 11/15/23 0830   Incision Assessment Other (Comment) 11/15/23 0830   Drainage Amount Moderate (25-50%) 11/15/23 0830   Drainage Description Serosanguinous 11/15/23 0830   Valerie-incision Assessment Other (Comment) 11/15/23 0830   Number of days: 21        Elimination:  Continence:    Bowel: Yes  Bladder: Yes  Urinary Catheter: None   Colostomy/Ileostomy/Ileal Conduit: No       Date of Last BM: 11/14/23    Intake/Output

## 2023-11-15 NOTE — PROGRESS NOTES
Physical Therapy  Facility/Department: [address] ORTHO/MED SURG  Physical Therapy Treatment Note    Name: Leo Stratton  : 1980  MRN: [id number]  Date of Service: 11/15/2023    Discharge Recommendations:  Patient would benefit from continued therapy after discharge   PT Equipment Recommendations  Equipment Needed: No  Other: pt owns RW and WC      Patient Diagnosis(es): There were no encounter diagnoses. 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 encounter, Constipation, Depression, Fibromyalgia, Genital herpes, unspecified, GERD (gastroesophageal reflux disease), H/O bariatric surgery, History of pulmonary embolism, Hx of blood clots, Hypertension, Lives in nursing home, Lumbosacral spondylosis without myelopathy, MDRO (multiple drug resistant organisms) resistance, Mobility impaired, MRSA (methicillin resistant staph aureus) culture positive, Muscle weakness, Obsessive-compulsive disorder, unspecified, Opioid abuse, in remission ([address]), Pernicious anemia, Polyneuropathy, unspecified, PTSD (post-traumatic stress disorder), Pulmonary embolism ([address]), Suicidal behavior, SVT (supraventricular tachycardia), Type 2 diabetes mellitus, with long-term current use of insulin ([address]), Under care of team, and Under care of team.  Past Surgical History:  has a past surgical history that includes Cholecystectomy; Liver Resection (); Tonsillectomy; Abdominal hernia repair (); Abdominal hernia repair (2014); Bariatric Surgery (2013); Dilation and curettage of uterus (); Finger amputation (Left, 2015); lymph node biopsy (); Total abdominal hysterectomy w/ bilateral salpingoophorectomy (); IVC filter insertion; hip surgery (Left);  Ankle

## 2023-11-15 NOTE — TELEPHONE ENCOUNTER
Pts mother called in asking if hyperbaric oxygen treatment is something  would consider for her since she is diabetic and has some issues \"healing well\". Noted her  has had this in the past he is also diabetic and it has helped him.  Please advise

## 2023-11-15 NOTE — PROGRESS NOTES
[facility]  Internal Medicine Teaching Residency Program  Inpatient Daily Progress Note  ______________________________________________________________________________    Patient: Ruth Ann Andrews  YOB: 1980   SVM:[id number]    Acct: [de-identified]     Room: [address] [place] [address]  Admit date: 11/7/2023  Today's date: 11/15/23  Number of days in the hospital: 8    SUBJECTIVE   Admitting Diagnosis: S/P total left hip arthroplasty  CC: Left hip pain  Pt examined at bedside. Chart & results reviewed. Afebrile. VSS on RA. Distal left hip drain removed this morning. Patient reports no complaints. On rocephin and rifampin until 12/7 for left hip prosthesis infection, MSSA positive. Medically stable for discharge from medicine standpoint. ROS:  Constitutional:  negative for chills, fevers, sweats  Respiratory:  negative for cough, dyspnea on exertion, hemoptysis, shortness of breath, wheezing  Cardiovascular:  negative for chest pain, chest pressure/discomfort, lower extremity edema, palpitations  Gastrointestinal:  negative for abdominal pain, constipation, diarrhea, nausea, vomiting  Neurological:  negative for dizziness, headache   BRIEF HISTORY     The patient is a pleasant 39 y.o. female with a past medical history of DM2 on insulin, antiphospholipid syndrome on fondaparinux, bipolar 1, recurrent falls secondary to alcohol use, Painting's esophagus presents for removal of left hip prosthesis with placement of antibiotic spacer and incision and drainage of chronic left hip infection by orthopedic surgery. Patient sustained a left femoral neck fracture secondary to fall while intoxicated for which she underwent left hemiarthroplasty on 7/19. Surgery complicated by wound dehiscence and left hip abscess, culture positive for MSSA status post I&D. At that time, infection was determined to not involve the prosthetic joint.   Treated with Rocephin but due to docusate sodium  100 mg Oral BID    baclofen  20 mg Oral BID    buprenorphine  1 patch TransDERmal Weekly    buPROPion  150 mg Oral QAM    FLUoxetine  40 mg Oral Daily    folic acid  1 mg Oral Daily    insulin glargine  10 Units SubCUTAneous Nightly    losartan  50 mg Oral Daily    metoprolol tartrate  25 mg Oral Daily    pravastatin  40 mg Oral Nightly    valACYclovir  500 mg Oral Daily    fondaparinux  10 mg SubCUTAneous Daily    sodium chloride flush  5-40 mL IntraVENous 2 times per day    cyclobenzaprine  10 mg Oral Q6H    acetaminophen  1,000 mg Oral 4 times per day     Continuous Infusions:    sodium chloride      sodium chloride      dextrose      sodium chloride       PRN Medicationssodium chloride, , PRN  oxyCODONE, 5 mg, Q4H PRN   Or  oxyCODONE, 10 mg, Q4H PRN  HYDROmorphone, 0.5 mg, Q4H PRN  sodium chloride, , PRN  LORazepam, 0.5 mg, Q8H PRN  midodrine, 2.5 mg, BID PRN  pantoprazole, 40 mg, Daily PRN  glucose, 4 tablet, PRN  dextrose bolus, 125 mL, PRN   Or  dextrose bolus, 250 mL, PRN  glucagon (rDNA), 1 mg, PRN  dextrose, , Continuous PRN  sodium chloride flush, 5-40 mL, PRN  sodium chloride, , PRN  ondansetron, 4 mg, Q8H PRN   Or  ondansetron, 4 mg, Q6H PRN  polyethylene glycol, 17 g, Daily PRN  melatonin, 6 mg, Nightly PRN  polyvinyl alcohol, 1 drop, PRN  calcium carbonate, 500 mg, TID PRN  benzonatate, 100 mg, TID PRN  benzocaine-menthol, 1 lozenge, Q2H PRN        Diagnostic Labs:  CBC:   Recent Labs     11/12/23  1834 11/13/23  1129   WBC  --  9.0   RBC  --  3.40*   HGB 9.4* 10.0*   HCT 33.7* 31.8*   MCV  --  93.5   RDW  --  16.3*   PLT  --  199     BMP:   No results for input(s): \"NA\", \"K\", \"CL\", \"CO2\", \"PHOS\", \"BUN\", \"CREATININE\", \"CA\" in the last 72 hours. BNP: No results for input(s): \"BNP\" in the last 72 hours. PT/INR: No results for input(s): \"PROTIME\", \"INR\" in the last 72 hours. APTT: No results for input(s): \"APTT\" in the last 72 hours.   CARDIAC ENZYMES: No results for input(s):

## 2023-11-15 NOTE — PROGRESS NOTES
Occupational Therapy  Facility/Department: [facility] 2C ORTHO/MED SURG  Occupational Therapy Daily Treatment Note    Name: Gilma Power  : 1980  MRN: [id number]  Date of Service: 11/15/2023    Discharge Recommendations:  Patient would benefit from continued therapy after discharge     Patient Diagnosis(es): There were no encounter diagnoses. 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 encounter, Constipation, Depression, Fibromyalgia, Genital herpes, unspecified, GERD (gastroesophageal reflux disease), H/O bariatric surgery, History of pulmonary embolism, Hx of blood clots, Hypertension, Lives in nursing home, Lumbosacral spondylosis without myelopathy, MDRO (multiple drug resistant organisms) resistance, Mobility impaired, MRSA (methicillin resistant staph aureus) culture positive, Muscle weakness, Obsessive-compulsive disorder, unspecified, Opioid abuse, in remission ([address]), Pernicious anemia, Polyneuropathy, unspecified, PTSD (post-traumatic stress disorder), Pulmonary embolism ([address]), Suicidal behavior, SVT (supraventricular tachycardia), Type 2 diabetes mellitus, with long-term current use of insulin ([address]), Under care of team, and Under care of team.  Past Surgical History:  has a past surgical history that includes Cholecystectomy; Liver Resection (); Tonsillectomy; Abdominal hernia repair (); Abdominal hernia repair (2014); Bariatric Surgery (2013); Dilation and curettage of uterus (); Finger amputation (Left, 2015); lymph node biopsy (); Total abdominal hysterectomy w/ bilateral salpingoophorectomy (); IVC filter insertion; hip surgery (Left);  Ankle fracture surgery (Left);  cath power picc single (2023); pt for therapy this date; Pt agreeable, cooperative, pleasant; Pt reported 8/10 at rest and 10/10 pain w/ movement; Pt engaged in activity and repositioned to address pain         Objective   Safety Devices  Type of Devices: Call light within reach;Gait belt;Patient at risk for falls;Nurse notified; All fall risk precautions in place; Left in chair;Chair alarm in place  Restraints  Restraints Initially in Place: No  Balance  Sitting: Without support (CGA once midline achieved at /eob ~5 minutes and supported in recliner with positioning to achieve minimal relief of pain in L hip area)  Standing: With support (RW  MIN A x2 with mod verbal cues for balance and safety during transfer to chair ~30-60 seconds)  Transfer Training  Transfer Training: Yes  Overall Level of Assistance: Assist X2;Minimum assistance  Interventions: Safety awareness training; Tactile cues; Verbal cues (Cues for hand placement, body mechanics and safety)  Sit to Stand: Assist X2;Minimum assistance  Stand to Sit: Minimum assistance;Assist X2  Bed to Chair: Minimum assistance;Assist X2 (Increased time required for transition due to increased pain with all weight bearing to LLE)  Gait  Overall Level of Assistance: Assist X2;Minimum assistance  Distance (ft):  (Transfer from EOB to recliner only)  Assistive Device: Walker, rolling;Gait belt        ADL  Grooming: Modified independent ; Increased time to complete  Grooming Skilled Clinical Factors: Pt completed washign face, neck and hands, oral rinse and brush hair while seated in recliner. Pt in significant pain this session with all movement  UE Bathing: Supervision;Setup; Increased time to complete  UE Bathing Skilled Clinical Factors: Completed washing under arms while seated in recliner just to freshen up as patient reported having a full bath last night  LE Dressing: Dependent/Total  LE Dressing Skilled Clinical Factors: Pt unable to don/doff slipper socks this session due to hip precautions and

## 2023-11-16 NOTE — TELEPHONE ENCOUNTER
Spoke with patient mother and informed her of reasons patient did not qualify for services. Patient will discuss in detail plan of care with Dr. Cecil Gomes at next ov.

## 2023-11-16 NOTE — TELEPHONE ENCOUNTER
Mother called to report that She called to schedule Hyperbaric treatments and was told patient did not meet Criteria. Mother would like to be called back at [phone] to discuss.

## 2023-11-16 NOTE — DISCHARGE SUMMARY
Orthopedic  Discharge Summary         Patient Identification:  Ignacia Mariscal is a 39 y.o. female. :  1980  MRN: [id number]     Acct: [de-identified]   Admit Date:  2023  Discharge date and time: 11/15/2023  6:31 PM     Attending Provider: Dr. Harman Chan.                                Reason for Admission:   - Surgical site infection  - Left hip postoperative wound infection     Discharge Diagnoses:   Patient Active Problem List   Diagnosis    Pernicious anemia    History of ulcer disease    History of DVT of lower extremity    Primary hypercoagulable state ([address])    Amputation finger    GERD (gastroesophageal reflux disease)    Alcohol dependence in remission ([address])    Type 2 diabetes mellitus with diabetic polyneuropathy, with long-term current use of insulin (HCC)    Depression with suicidal ideation    Primary insomnia    Essential hypertension    Bipolar affective disorder in remission ([address])    History of pulmonary embolism    Antiphospholipid syndrome (HCC)    Alcohol intoxication ([address])    Hypertriglyceridemia    Chronic post-traumatic stress disorder (PTSD)    OCD (obsessive compulsive disorder)    Severe benzodiazepine use disorder (HCC)    Obesity, Class III, BMI 40-49.9 (morbid obesity) ([address])    History of MRSA infection    Painting esophagus    NAFLD (nonalcoholic fatty liver disease)    Nondependent opioid abuse in remission ([address])    History of Awa-en-Y gastric bypass    Herpes genitalis    History of drug abuse ([address])    Malabsorption    History of pulmonary embolus (PE)    Vitamin B 12 deficiency    Vitamin D deficiency    History of surgery of liver    Blood alkaline phosphatase increased compared with prior measurement    SVT (supraventricular tachycardia)    Anxiety    Chronic idiopathic constipation    Recurrent incisional hernia    History of small bowel obstruction    History of peptic ulcer    Fibromyalgia    Polyneuropathy    COVID-19 virus infection    Acute pancreatitis    Multifocal

## 2023-11-17 ENCOUNTER — TELEPHONE (OUTPATIENT)
Dept: ORTHOPEDIC SURGERY | Age: 43
End: 2023-11-17

## 2023-11-17 NOTE — TELEPHONE ENCOUNTER
DATE OF PROCEDURE:  11/07/2023     PREOPERATIVE DIAGNOSIS:  Left hip prosthetic joint infection. POSTOPERATIVE DIAGNOSIS:  Left hip prosthetic joint infection. OPERATIONS PERFORMED:  1. Removal of left hip prosthesis with placement of antibiotic spacer. 2.  Irrigation and excisional debridement of skin down to including the  bone of 10 x 20 cm wound, left hip    Patient called in reporting  excruciating pain to her left hip area that's causing her to vomit. Patient states that she is taking the scheduled pain medication and zofran. Wanted to know if you would recommend anything else to help.  Please advise

## 2023-11-17 NOTE — TELEPHONE ENCOUNTER
Informed patient, she states that the pain is very bad and she does feel she should be in that much pain- informed patient if she felt she needed further assessment she should present to ED.  Patient verbalized understanding

## 2023-11-18 LAB
MICROORGANISM SPEC CULT: NORMAL
MICROORGANISM SPEC CULT: NORMAL
SERVICE CMNT-IMP: NORMAL
SERVICE CMNT-IMP: NORMAL
SPECIMEN DESCRIPTION: NORMAL
SPECIMEN DESCRIPTION: NORMAL

## 2023-11-20 LAB
MICROORGANISM SPEC CULT: NORMAL
MICROORGANISM/AGENT SPEC: NORMAL
SPECIMEN DESCRIPTION: NORMAL

## 2023-11-26 ENCOUNTER — ANESTHESIA (OUTPATIENT)
Dept: OPERATING ROOM | Age: 43
End: 2023-11-26
Payer: MEDICARE

## 2023-11-26 ENCOUNTER — HOSPITAL ENCOUNTER (INPATIENT)
Age: 43
LOS: 13 days | Discharge: NURSING, REHAB OR LONG-TERM CARE | DRG: 498 | End: 2023-12-09
Attending: EMERGENCY MEDICINE | Admitting: STUDENT IN AN ORGANIZED HEALTH CARE EDUCATION/TRAINING PROGRAM
Payer: MEDICARE

## 2023-11-26 ENCOUNTER — APPOINTMENT (OUTPATIENT)
Dept: GENERAL RADIOLOGY | Age: 43
DRG: 498 | End: 2023-11-26
Payer: MEDICARE

## 2023-11-26 ENCOUNTER — ANESTHESIA EVENT (OUTPATIENT)
Dept: OPERATING ROOM | Age: 43
End: 2023-11-26
Payer: MEDICARE

## 2023-11-26 DIAGNOSIS — L02.416 ABSCESS OF LEFT HIP: Chronic | ICD-10-CM

## 2023-11-26 DIAGNOSIS — Z96.649 INFECTION OF PROSTHETIC HIP JOINT, INITIAL ENCOUNTER (HCC): ICD-10-CM

## 2023-11-26 DIAGNOSIS — T84.50XD INFECTION OF PROSTHETIC JOINT, SUBSEQUENT ENCOUNTER: ICD-10-CM

## 2023-11-26 DIAGNOSIS — M25.552 LEFT HIP PAIN: Primary | ICD-10-CM

## 2023-11-26 DIAGNOSIS — T81.49XA LEFT HIP POSTOPERATIVE WOUND INFECTION: ICD-10-CM

## 2023-11-26 DIAGNOSIS — T81.49XA SURGICAL SITE INFECTION: ICD-10-CM

## 2023-11-26 DIAGNOSIS — S72.002A CLOSED FRACTURE OF LEFT HIP, INITIAL ENCOUNTER (HCC): ICD-10-CM

## 2023-11-26 DIAGNOSIS — T84.59XA INFECTION OF PROSTHETIC HIP JOINT, INITIAL ENCOUNTER (HCC): ICD-10-CM

## 2023-11-26 PROBLEM — T81.30XA WOUND DEHISCENCE: Status: ACTIVE | Noted: 2023-11-26

## 2023-11-26 LAB
25(OH)D3 SERPL-MCNC: 15.9 NG/ML
ALBUMIN SERPL-MCNC: 2.2 G/DL (ref 3.5–5.2)
ALBUMIN SERPL-MCNC: 2.6 G/DL (ref 3.5–5.2)
ALBUMIN/GLOB SERPL: 0.6 {RATIO} (ref 1–2.5)
ALP SERPL-CCNC: 143 U/L (ref 35–104)
ALT SERPL-CCNC: 8 U/L (ref 5–33)
ANION GAP SERPL CALCULATED.3IONS-SCNC: 11 MMOL/L (ref 9–17)
AST SERPL-CCNC: 11 U/L
BASOPHILS # BLD: 0.06 K/UL (ref 0–0.2)
BASOPHILS NFR BLD: 1 % (ref 0–2)
BILIRUB SERPL-MCNC: <0.1 MG/DL (ref 0.3–1.2)
BUN SERPL-MCNC: 8 MG/DL (ref 6–20)
CALCIUM SERPL-MCNC: 8.2 MG/DL (ref 8.6–10.4)
CHLORIDE SERPL-SCNC: 105 MMOL/L (ref 98–107)
CO2 SERPL-SCNC: 20 MMOL/L (ref 20–31)
CREAT SERPL-MCNC: 0.5 MG/DL (ref 0.5–0.9)
CRP SERPL HS-MCNC: 15.4 MG/L (ref 0–5)
EOSINOPHIL # BLD: 0.21 K/UL (ref 0–0.44)
EOSINOPHILS RELATIVE PERCENT: 2 % (ref 1–4)
ERYTHROCYTE [DISTWIDTH] IN BLOOD BY AUTOMATED COUNT: 16.2 % (ref 11.8–14.4)
ERYTHROCYTE [SEDIMENTATION RATE] IN BLOOD BY PHOTOMETRIC METHOD: 43 MM/HR (ref 0–20)
FOLATE SERPL-MCNC: 15.8 NG/ML
GFR SERPL CREATININE-BSD FRML MDRD: >60 ML/MIN/1.73M2
GLUCOSE BLD-MCNC: 180 MG/DL (ref 65–105)
GLUCOSE BLD-MCNC: 207 MG/DL (ref 65–105)
GLUCOSE BLD-MCNC: 76 MG/DL (ref 65–105)
GLUCOSE BLD-MCNC: 77 MG/DL (ref 65–105)
GLUCOSE BLD-MCNC: 90 MG/DL (ref 65–105)
GLUCOSE SERPL-MCNC: 189 MG/DL (ref 70–99)
HCT VFR BLD AUTO: 34 % (ref 36.3–47.1)
HGB BLD-MCNC: 10.1 G/DL (ref 11.9–15.1)
IMM GRANULOCYTES # BLD AUTO: 0.04 K/UL (ref 0–0.3)
IMM GRANULOCYTES NFR BLD: 1 %
INR PPP: 1
IRON SATN MFR SERPL: 21 % (ref 20–55)
IRON SERPL-MCNC: 29 UG/DL (ref 37–145)
LYMPHOCYTES NFR BLD: 1.71 K/UL (ref 1.1–3.7)
LYMPHOCYTES RELATIVE PERCENT: 20 % (ref 24–43)
MAGNESIUM SERPL-MCNC: 1.9 MG/DL (ref 1.6–2.6)
MCH RBC QN AUTO: 28.5 PG (ref 25.2–33.5)
MCHC RBC AUTO-ENTMCNC: 29.7 G/DL (ref 28.4–34.8)
MCV RBC AUTO: 96 FL (ref 82.6–102.9)
MONOCYTES NFR BLD: 0.5 K/UL (ref 0.1–1.2)
MONOCYTES NFR BLD: 6 % (ref 3–12)
NEUTROPHILS NFR BLD: 70 % (ref 36–65)
NEUTS SEG NFR BLD: 6.13 K/UL (ref 1.5–8.1)
NRBC BLD-RTO: 0 PER 100 WBC
PARTIAL THROMBOPLASTIN TIME: 24.4 SEC (ref 23–36.5)
PLATELET # BLD AUTO: 598 K/UL (ref 138–453)
PMV BLD AUTO: 10.2 FL (ref 8.1–13.5)
POTASSIUM SERPL-SCNC: 3.5 MMOL/L (ref 3.7–5.3)
PREALB SERPL-MCNC: 11.9 MG/DL (ref 20–40)
PROT SERPL-MCNC: 5.6 G/DL (ref 6.4–8.3)
PROTHROMBIN TIME: 13.3 SEC (ref 11.7–14.9)
RBC # BLD AUTO: 3.54 M/UL (ref 3.95–5.11)
RBC # BLD: ABNORMAL 10*6/UL
SODIUM SERPL-SCNC: 136 MMOL/L (ref 135–144)
TIBC SERPL-MCNC: 135 UG/DL (ref 250–450)
TRANSFERRIN SERPL-MCNC: 118 MG/DL (ref 200–360)
UNSATURATED IRON BINDING CAPACITY: 106 UG/DL (ref 112–347)
VIT B12 SERPL-MCNC: 336 PG/ML (ref 232–1245)
WBC OTHER # BLD: 8.7 K/UL (ref 3.5–11.3)

## 2023-11-26 PROCEDURE — 11046 DBRDMT MUSC&/FSCA EA ADDL: CPT | Performed by: STUDENT IN AN ORGANIZED HEALTH CARE EDUCATION/TRAINING PROGRAM

## 2023-11-26 PROCEDURE — 83735 ASSAY OF MAGNESIUM: CPT

## 2023-11-26 PROCEDURE — 1200000000 HC SEMI PRIVATE

## 2023-11-26 PROCEDURE — 7100000001 HC PACU RECOVERY - ADDTL 15 MIN: Performed by: STUDENT IN AN ORGANIZED HEALTH CARE EDUCATION/TRAINING PROGRAM

## 2023-11-26 PROCEDURE — 3600000014 HC SURGERY LEVEL 4 ADDTL 15MIN: Performed by: STUDENT IN AN ORGANIZED HEALTH CARE EDUCATION/TRAINING PROGRAM

## 2023-11-26 PROCEDURE — 3600000004 HC SURGERY LEVEL 4 BASE: Performed by: STUDENT IN AN ORGANIZED HEALTH CARE EDUCATION/TRAINING PROGRAM

## 2023-11-26 PROCEDURE — 6370000000 HC RX 637 (ALT 250 FOR IP)

## 2023-11-26 PROCEDURE — 85730 THROMBOPLASTIN TIME PARTIAL: CPT

## 2023-11-26 PROCEDURE — 6370000000 HC RX 637 (ALT 250 FOR IP): Performed by: NURSE PRACTITIONER

## 2023-11-26 PROCEDURE — 6360000002 HC RX W HCPCS: Performed by: STUDENT IN AN ORGANIZED HEALTH CARE EDUCATION/TRAINING PROGRAM

## 2023-11-26 PROCEDURE — 99285 EMERGENCY DEPT VISIT HI MDM: CPT

## 2023-11-26 PROCEDURE — 99254 IP/OBS CNSLTJ NEW/EST MOD 60: CPT | Performed by: INTERNAL MEDICINE

## 2023-11-26 PROCEDURE — 80053 COMPREHEN METABOLIC PANEL: CPT

## 2023-11-26 PROCEDURE — 86140 C-REACTIVE PROTEIN: CPT

## 2023-11-26 PROCEDURE — 2580000003 HC RX 258

## 2023-11-26 PROCEDURE — 36415 COLL VENOUS BLD VENIPUNCTURE: CPT

## 2023-11-26 PROCEDURE — 0KBR0ZZ EXCISION OF LEFT UPPER LEG MUSCLE, OPEN APPROACH: ICD-10-PCS | Performed by: STUDENT IN AN ORGANIZED HEALTH CARE EDUCATION/TRAINING PROGRAM

## 2023-11-26 PROCEDURE — 6360000002 HC RX W HCPCS

## 2023-11-26 PROCEDURE — 83540 ASSAY OF IRON: CPT

## 2023-11-26 PROCEDURE — 97606 NEG PRS WND THER DME>50 SQCM: CPT | Performed by: STUDENT IN AN ORGANIZED HEALTH CARE EDUCATION/TRAINING PROGRAM

## 2023-11-26 PROCEDURE — 6370000000 HC RX 637 (ALT 250 FOR IP): Performed by: STUDENT IN AN ORGANIZED HEALTH CARE EDUCATION/TRAINING PROGRAM

## 2023-11-26 PROCEDURE — 2709999900 HC NON-CHARGEABLE SUPPLY: Performed by: STUDENT IN AN ORGANIZED HEALTH CARE EDUCATION/TRAINING PROGRAM

## 2023-11-26 PROCEDURE — 96374 THER/PROPH/DIAG INJ IV PUSH: CPT

## 2023-11-26 PROCEDURE — 84466 ASSAY OF TRANSFERRIN: CPT

## 2023-11-26 PROCEDURE — 85652 RBC SED RATE AUTOMATED: CPT

## 2023-11-26 PROCEDURE — 99223 1ST HOSP IP/OBS HIGH 75: CPT | Performed by: STUDENT IN AN ORGANIZED HEALTH CARE EDUCATION/TRAINING PROGRAM

## 2023-11-26 PROCEDURE — 3700000001 HC ADD 15 MINUTES (ANESTHESIA): Performed by: STUDENT IN AN ORGANIZED HEALTH CARE EDUCATION/TRAINING PROGRAM

## 2023-11-26 PROCEDURE — 83550 IRON BINDING TEST: CPT

## 2023-11-26 PROCEDURE — 96376 TX/PRO/DX INJ SAME DRUG ADON: CPT

## 2023-11-26 PROCEDURE — 99254 IP/OBS CNSLTJ NEW/EST MOD 60: CPT | Performed by: STUDENT IN AN ORGANIZED HEALTH CARE EDUCATION/TRAINING PROGRAM

## 2023-11-26 PROCEDURE — 2500000003 HC RX 250 WO HCPCS

## 2023-11-26 PROCEDURE — 73502 X-RAY EXAM HIP UNI 2-3 VIEWS: CPT

## 2023-11-26 PROCEDURE — 2580000003 HC RX 258: Performed by: STUDENT IN AN ORGANIZED HEALTH CARE EDUCATION/TRAINING PROGRAM

## 2023-11-26 PROCEDURE — 87040 BLOOD CULTURE FOR BACTERIA: CPT

## 2023-11-26 PROCEDURE — 82607 VITAMIN B-12: CPT

## 2023-11-26 PROCEDURE — 6360000002 HC RX W HCPCS: Performed by: NURSE PRACTITIONER

## 2023-11-26 PROCEDURE — 82746 ASSAY OF FOLIC ACID SERUM: CPT

## 2023-11-26 PROCEDURE — 11043 DBRDMT MUSC&/FSCA 1ST 20/<: CPT | Performed by: STUDENT IN AN ORGANIZED HEALTH CARE EDUCATION/TRAINING PROGRAM

## 2023-11-26 PROCEDURE — 82306 VITAMIN D 25 HYDROXY: CPT

## 2023-11-26 PROCEDURE — 2W1MX6Z COMPRESSION OF LEFT LOWER EXTREMITY USING PRESSURE DRESSING: ICD-10-PCS | Performed by: STUDENT IN AN ORGANIZED HEALTH CARE EDUCATION/TRAINING PROGRAM

## 2023-11-26 PROCEDURE — 2580000003 HC RX 258: Performed by: NURSE PRACTITIONER

## 2023-11-26 PROCEDURE — 82040 ASSAY OF SERUM ALBUMIN: CPT

## 2023-11-26 PROCEDURE — 84134 ASSAY OF PREALBUMIN: CPT

## 2023-11-26 PROCEDURE — 85025 COMPLETE CBC W/AUTO DIFF WBC: CPT

## 2023-11-26 PROCEDURE — 82947 ASSAY GLUCOSE BLOOD QUANT: CPT

## 2023-11-26 PROCEDURE — 6360000002 HC RX W HCPCS: Performed by: INTERNAL MEDICINE

## 2023-11-26 PROCEDURE — 7100000000 HC PACU RECOVERY - FIRST 15 MIN: Performed by: STUDENT IN AN ORGANIZED HEALTH CARE EDUCATION/TRAINING PROGRAM

## 2023-11-26 PROCEDURE — 96375 TX/PRO/DX INJ NEW DRUG ADDON: CPT

## 2023-11-26 PROCEDURE — 85610 PROTHROMBIN TIME: CPT

## 2023-11-26 PROCEDURE — 3700000000 HC ANESTHESIA ATTENDED CARE: Performed by: STUDENT IN AN ORGANIZED HEALTH CARE EDUCATION/TRAINING PROGRAM

## 2023-11-26 RX ORDER — FENTANYL CITRATE 50 UG/ML
INJECTION, SOLUTION INTRAMUSCULAR; INTRAVENOUS
Status: COMPLETED
Start: 2023-11-26 | End: 2023-11-26

## 2023-11-26 RX ORDER — TIZANIDINE 4 MG/1
4 TABLET ORAL ONCE
Status: COMPLETED | OUTPATIENT
Start: 2023-11-26 | End: 2023-11-26

## 2023-11-26 RX ORDER — FLUOXETINE HYDROCHLORIDE 20 MG/1
40 CAPSULE ORAL DAILY
Status: DISCONTINUED | OUTPATIENT
Start: 2023-11-26 | End: 2023-12-10 | Stop reason: HOSPADM

## 2023-11-26 RX ORDER — SODIUM CHLORIDE 0.9 % (FLUSH) 0.9 %
5-40 SYRINGE (ML) INJECTION EVERY 12 HOURS SCHEDULED
Status: DISCONTINUED | OUTPATIENT
Start: 2023-11-26 | End: 2023-11-26 | Stop reason: HOSPADM

## 2023-11-26 RX ORDER — DEXTROSE MONOHYDRATE 100 MG/ML
INJECTION, SOLUTION INTRAVENOUS CONTINUOUS PRN
Status: DISCONTINUED | OUTPATIENT
Start: 2023-11-26 | End: 2023-12-10 | Stop reason: HOSPADM

## 2023-11-26 RX ORDER — POTASSIUM CHLORIDE 7.45 MG/ML
10 INJECTION INTRAVENOUS PRN
Status: DISCONTINUED | OUTPATIENT
Start: 2023-11-26 | End: 2023-12-10 | Stop reason: HOSPADM

## 2023-11-26 RX ORDER — POTASSIUM CHLORIDE 20 MEQ/1
40 TABLET, EXTENDED RELEASE ORAL PRN
Status: DISCONTINUED | OUTPATIENT
Start: 2023-11-26 | End: 2023-12-10 | Stop reason: HOSPADM

## 2023-11-26 RX ORDER — ACETAMINOPHEN 325 MG/1
650 TABLET ORAL EVERY 6 HOURS PRN
Status: DISCONTINUED | OUTPATIENT
Start: 2023-11-26 | End: 2023-11-27

## 2023-11-26 RX ORDER — ONDANSETRON 2 MG/ML
4 INJECTION INTRAMUSCULAR; INTRAVENOUS EVERY 6 HOURS PRN
Status: DISCONTINUED | OUTPATIENT
Start: 2023-11-26 | End: 2023-12-10 | Stop reason: HOSPADM

## 2023-11-26 RX ORDER — OXYCODONE HYDROCHLORIDE 5 MG/1
5 TABLET ORAL ONCE
Status: DISCONTINUED | OUTPATIENT
Start: 2023-11-26 | End: 2023-11-26

## 2023-11-26 RX ORDER — FENTANYL CITRATE 50 UG/ML
INJECTION, SOLUTION INTRAMUSCULAR; INTRAVENOUS PRN
Status: DISCONTINUED | OUTPATIENT
Start: 2023-11-26 | End: 2023-11-26 | Stop reason: SDUPTHER

## 2023-11-26 RX ORDER — SODIUM HYPOCHLORITE 1.25 MG/ML
SOLUTION TOPICAL DAILY
Status: DISPENSED | OUTPATIENT
Start: 2023-11-26 | End: 2023-11-30

## 2023-11-26 RX ORDER — ACETAMINOPHEN 650 MG/1
650 SUPPOSITORY RECTAL EVERY 6 HOURS PRN
Status: DISCONTINUED | OUTPATIENT
Start: 2023-11-26 | End: 2023-11-27

## 2023-11-26 RX ORDER — FENTANYL CITRATE 50 UG/ML
50 INJECTION, SOLUTION INTRAMUSCULAR; INTRAVENOUS ONCE
Status: COMPLETED | OUTPATIENT
Start: 2023-11-26 | End: 2023-11-26

## 2023-11-26 RX ORDER — RIFAMPIN 300 MG/1
600 CAPSULE ORAL DAILY
Status: DISCONTINUED | OUTPATIENT
Start: 2023-11-27 | End: 2023-12-10 | Stop reason: HOSPADM

## 2023-11-26 RX ORDER — SODIUM HYPOCHLORITE 1.25 MG/ML
SOLUTION TOPICAL ONCE
Status: DISCONTINUED | OUTPATIENT
Start: 2023-11-26 | End: 2023-12-10 | Stop reason: HOSPADM

## 2023-11-26 RX ORDER — PROPOFOL 10 MG/ML
INJECTION, EMULSION INTRAVENOUS PRN
Status: DISCONTINUED | OUTPATIENT
Start: 2023-11-26 | End: 2023-11-26 | Stop reason: SDUPTHER

## 2023-11-26 RX ORDER — ONDANSETRON 4 MG/1
4 TABLET, ORALLY DISINTEGRATING ORAL EVERY 8 HOURS PRN
Status: DISCONTINUED | OUTPATIENT
Start: 2023-11-26 | End: 2023-12-10 | Stop reason: HOSPADM

## 2023-11-26 RX ORDER — INSULIN GLARGINE 100 [IU]/ML
24 INJECTION, SOLUTION SUBCUTANEOUS DAILY
Status: DISCONTINUED | OUTPATIENT
Start: 2023-11-26 | End: 2023-12-10 | Stop reason: HOSPADM

## 2023-11-26 RX ORDER — FENTANYL CITRATE 50 UG/ML
50 INJECTION, SOLUTION INTRAMUSCULAR; INTRAVENOUS ONCE
Status: DISCONTINUED | OUTPATIENT
Start: 2023-11-26 | End: 2023-11-28

## 2023-11-26 RX ORDER — SODIUM CHLORIDE 9 MG/ML
INJECTION, SOLUTION INTRAVENOUS PRN
Status: DISCONTINUED | OUTPATIENT
Start: 2023-11-26 | End: 2023-12-10 | Stop reason: HOSPADM

## 2023-11-26 RX ORDER — INSULIN LISPRO 100 [IU]/ML
0-8 INJECTION, SOLUTION INTRAVENOUS; SUBCUTANEOUS
Status: DISCONTINUED | OUTPATIENT
Start: 2023-11-26 | End: 2023-12-10 | Stop reason: HOSPADM

## 2023-11-26 RX ORDER — INSULIN LISPRO 100 [IU]/ML
0-4 INJECTION, SOLUTION INTRAVENOUS; SUBCUTANEOUS NIGHTLY
Status: DISCONTINUED | OUTPATIENT
Start: 2023-11-26 | End: 2023-12-10 | Stop reason: HOSPADM

## 2023-11-26 RX ORDER — CARISOPRODOL 350 MG/1
350 TABLET ORAL 3 TIMES DAILY PRN
COMMUNITY

## 2023-11-26 RX ORDER — SODIUM CHLORIDE 0.9 % (FLUSH) 0.9 %
5-40 SYRINGE (ML) INJECTION EVERY 12 HOURS SCHEDULED
Status: DISCONTINUED | OUTPATIENT
Start: 2023-11-26 | End: 2023-12-10 | Stop reason: HOSPADM

## 2023-11-26 RX ORDER — SODIUM HYPOCHLORITE 1.25 MG/ML
SOLUTION TOPICAL PRN
Status: DISCONTINUED | OUTPATIENT
Start: 2023-11-26 | End: 2023-11-26 | Stop reason: HOSPADM

## 2023-11-26 RX ORDER — BUPROPION HYDROCHLORIDE 150 MG/1
150 TABLET ORAL EVERY MORNING
Status: DISCONTINUED | OUTPATIENT
Start: 2023-11-26 | End: 2023-12-10 | Stop reason: HOSPADM

## 2023-11-26 RX ORDER — SODIUM CHLORIDE 9 MG/ML
INJECTION, SOLUTION INTRAVENOUS CONTINUOUS PRN
Status: DISCONTINUED | OUTPATIENT
Start: 2023-11-26 | End: 2023-11-26 | Stop reason: SDUPTHER

## 2023-11-26 RX ORDER — LORAZEPAM 0.5 MG/1
0.5 TABLET ORAL EVERY 8 HOURS PRN
Status: DISCONTINUED | OUTPATIENT
Start: 2023-11-26 | End: 2023-12-08

## 2023-11-26 RX ORDER — MAGNESIUM SULFATE 1 G/100ML
1000 INJECTION INTRAVENOUS PRN
Status: DISCONTINUED | OUTPATIENT
Start: 2023-11-26 | End: 2023-12-10 | Stop reason: HOSPADM

## 2023-11-26 RX ORDER — SODIUM CHLORIDE 0.9 % (FLUSH) 0.9 %
10 SYRINGE (ML) INJECTION PRN
Status: DISCONTINUED | OUTPATIENT
Start: 2023-11-26 | End: 2023-12-10 | Stop reason: HOSPADM

## 2023-11-26 RX ORDER — PRAVASTATIN SODIUM 20 MG
40 TABLET ORAL NIGHTLY
Status: DISCONTINUED | OUTPATIENT
Start: 2023-11-26 | End: 2023-12-10 | Stop reason: HOSPADM

## 2023-11-26 RX ORDER — BUPRENORPHINE 5 UG/H
1 PATCH TRANSDERMAL WEEKLY
Status: DISCONTINUED | OUTPATIENT
Start: 2023-11-28 | End: 2023-12-10 | Stop reason: HOSPADM

## 2023-11-26 RX ORDER — ROCURONIUM BROMIDE 10 MG/ML
INJECTION, SOLUTION INTRAVENOUS PRN
Status: DISCONTINUED | OUTPATIENT
Start: 2023-11-26 | End: 2023-11-26 | Stop reason: SDUPTHER

## 2023-11-26 RX ORDER — OXYCODONE HYDROCHLORIDE 5 MG/1
5 TABLET ORAL EVERY 4 HOURS PRN
Status: DISCONTINUED | OUTPATIENT
Start: 2023-11-26 | End: 2023-12-08

## 2023-11-26 RX ORDER — LOSARTAN POTASSIUM 50 MG/1
50 TABLET ORAL DAILY
Status: DISCONTINUED | OUTPATIENT
Start: 2023-11-26 | End: 2023-12-10 | Stop reason: HOSPADM

## 2023-11-26 RX ORDER — ONDANSETRON 2 MG/ML
INJECTION INTRAMUSCULAR; INTRAVENOUS PRN
Status: DISCONTINUED | OUTPATIENT
Start: 2023-11-26 | End: 2023-11-26 | Stop reason: SDUPTHER

## 2023-11-26 RX ORDER — MIDAZOLAM HYDROCHLORIDE 1 MG/ML
INJECTION INTRAMUSCULAR; INTRAVENOUS PRN
Status: DISCONTINUED | OUTPATIENT
Start: 2023-11-26 | End: 2023-11-26 | Stop reason: SDUPTHER

## 2023-11-26 RX ORDER — ONDANSETRON 2 MG/ML
4 INJECTION INTRAMUSCULAR; INTRAVENOUS
Status: DISCONTINUED | OUTPATIENT
Start: 2023-11-26 | End: 2023-11-26 | Stop reason: HOSPADM

## 2023-11-26 RX ORDER — OXYCODONE HYDROCHLORIDE 5 MG/1
10 TABLET ORAL EVERY 4 HOURS PRN
Status: DISCONTINUED | OUTPATIENT
Start: 2023-11-26 | End: 2023-12-08

## 2023-11-26 RX ORDER — PANTOPRAZOLE SODIUM 40 MG/1
40 TABLET, DELAYED RELEASE ORAL DAILY PRN
Status: DISCONTINUED | OUTPATIENT
Start: 2023-11-26 | End: 2023-12-10 | Stop reason: HOSPADM

## 2023-11-26 RX ORDER — HYDRALAZINE HYDROCHLORIDE 20 MG/ML
10 INJECTION INTRAMUSCULAR; INTRAVENOUS
Status: DISCONTINUED | OUTPATIENT
Start: 2023-11-26 | End: 2023-11-26 | Stop reason: HOSPADM

## 2023-11-26 RX ORDER — TRANEXAMIC ACID 10 MG/ML
INJECTION, SOLUTION INTRAVENOUS PRN
Status: DISCONTINUED | OUTPATIENT
Start: 2023-11-26 | End: 2023-11-26 | Stop reason: SDUPTHER

## 2023-11-26 RX ORDER — FONDAPARINUX SODIUM 5 MG/.4ML
10 INJECTION SUBCUTANEOUS DAILY
Status: DISCONTINUED | OUTPATIENT
Start: 2023-11-26 | End: 2023-11-30

## 2023-11-26 RX ORDER — LIDOCAINE HYDROCHLORIDE 10 MG/ML
INJECTION, SOLUTION EPIDURAL; INFILTRATION; INTRACAUDAL; PERINEURAL PRN
Status: DISCONTINUED | OUTPATIENT
Start: 2023-11-26 | End: 2023-11-26 | Stop reason: SDUPTHER

## 2023-11-26 RX ORDER — SODIUM CHLORIDE 0.9 % (FLUSH) 0.9 %
5-40 SYRINGE (ML) INJECTION PRN
Status: DISCONTINUED | OUTPATIENT
Start: 2023-11-26 | End: 2023-11-26 | Stop reason: HOSPADM

## 2023-11-26 RX ORDER — DEXAMETHASONE SODIUM PHOSPHATE 10 MG/ML
INJECTION INTRAMUSCULAR; INTRAVENOUS PRN
Status: DISCONTINUED | OUTPATIENT
Start: 2023-11-26 | End: 2023-11-26 | Stop reason: SDUPTHER

## 2023-11-26 RX ORDER — 0.9 % SODIUM CHLORIDE 0.9 %
1000 INTRAVENOUS SOLUTION INTRAVENOUS ONCE
Status: COMPLETED | OUTPATIENT
Start: 2023-11-26 | End: 2023-11-26

## 2023-11-26 RX ORDER — SODIUM CHLORIDE 9 MG/ML
INJECTION, SOLUTION INTRAVENOUS PRN
Status: DISCONTINUED | OUTPATIENT
Start: 2023-11-26 | End: 2023-11-26 | Stop reason: HOSPADM

## 2023-11-26 RX ORDER — INSULIN GLARGINE 100 [IU]/ML
10 INJECTION, SOLUTION SUBCUTANEOUS NIGHTLY
Status: DISCONTINUED | OUTPATIENT
Start: 2023-11-26 | End: 2023-12-10 | Stop reason: HOSPADM

## 2023-11-26 RX ORDER — MAGNESIUM HYDROXIDE 1200 MG/15ML
LIQUID ORAL CONTINUOUS PRN
Status: DISCONTINUED | OUTPATIENT
Start: 2023-11-26 | End: 2023-11-26 | Stop reason: HOSPADM

## 2023-11-26 RX ORDER — POLYETHYLENE GLYCOL 3350 17 G/17G
17 POWDER, FOR SOLUTION ORAL DAILY PRN
Status: DISCONTINUED | OUTPATIENT
Start: 2023-11-26 | End: 2023-12-10 | Stop reason: HOSPADM

## 2023-11-26 RX ORDER — LORAZEPAM 2 MG/ML
2 INJECTION INTRAMUSCULAR ONCE
Status: COMPLETED | OUTPATIENT
Start: 2023-11-26 | End: 2023-11-26

## 2023-11-26 RX ADMIN — SUGAMMADEX 200 MG: 100 INJECTION, SOLUTION INTRAVENOUS at 12:49

## 2023-11-26 RX ADMIN — TRANEXAMIC ACID 1 G: 10 INJECTION, SOLUTION INTRAVENOUS at 11:46

## 2023-11-26 RX ADMIN — HYDROMORPHONE HYDROCHLORIDE 0.5 MG: 1 INJECTION, SOLUTION INTRAMUSCULAR; INTRAVENOUS; SUBCUTANEOUS at 13:29

## 2023-11-26 RX ADMIN — Medication 2000 MG: at 23:44

## 2023-11-26 RX ADMIN — ONDANSETRON 4 MG: 2 INJECTION INTRAMUSCULAR; INTRAVENOUS at 12:34

## 2023-11-26 RX ADMIN — PHENYLEPHRINE HYDROCHLORIDE 100 MCG: 10 INJECTION INTRAVENOUS at 11:13

## 2023-11-26 RX ADMIN — ROCURONIUM BROMIDE 50 MG: 10 INJECTION INTRAVENOUS at 11:08

## 2023-11-26 RX ADMIN — HYDROMORPHONE HYDROCHLORIDE 0.5 MG: 1 INJECTION, SOLUTION INTRAMUSCULAR; INTRAVENOUS; SUBCUTANEOUS at 02:19

## 2023-11-26 RX ADMIN — FENTANYL CITRATE 50 MCG: 50 INJECTION, SOLUTION INTRAMUSCULAR; INTRAVENOUS at 13:06

## 2023-11-26 RX ADMIN — HYDROMORPHONE HYDROCHLORIDE 0.5 MG: 1 INJECTION, SOLUTION INTRAMUSCULAR; INTRAVENOUS; SUBCUTANEOUS at 10:28

## 2023-11-26 RX ADMIN — FENTANYL CITRATE 50 MCG: 0.05 INJECTION, SOLUTION INTRAMUSCULAR; INTRAVENOUS at 12:49

## 2023-11-26 RX ADMIN — SODIUM CHLORIDE 1000 ML: 9 INJECTION, SOLUTION INTRAVENOUS at 02:24

## 2023-11-26 RX ADMIN — HYDROMORPHONE HYDROCHLORIDE 0.5 MG: 1 INJECTION, SOLUTION INTRAMUSCULAR; INTRAVENOUS; SUBCUTANEOUS at 18:42

## 2023-11-26 RX ADMIN — OXYCODONE 10 MG: 5 TABLET ORAL at 21:59

## 2023-11-26 RX ADMIN — Medication 2000 MG: at 20:26

## 2023-11-26 RX ADMIN — HYDROMORPHONE HYDROCHLORIDE 0.5 MG: 1 INJECTION, SOLUTION INTRAMUSCULAR; INTRAVENOUS; SUBCUTANEOUS at 23:44

## 2023-11-26 RX ADMIN — HYDROMORPHONE HYDROCHLORIDE 0.5 MG: 1 INJECTION, SOLUTION INTRAMUSCULAR; INTRAVENOUS; SUBCUTANEOUS at 13:17

## 2023-11-26 RX ADMIN — SODIUM CHLORIDE: 9 INJECTION, SOLUTION INTRAVENOUS at 11:01

## 2023-11-26 RX ADMIN — HYDROMORPHONE HYDROCHLORIDE 1 MG: 1 INJECTION, SOLUTION INTRAMUSCULAR; INTRAVENOUS; SUBCUTANEOUS at 03:00

## 2023-11-26 RX ADMIN — HYDROMORPHONE HYDROCHLORIDE 0.5 MG: 1 INJECTION, SOLUTION INTRAMUSCULAR; INTRAVENOUS; SUBCUTANEOUS at 13:12

## 2023-11-26 RX ADMIN — FENTANYL CITRATE 100 MCG: 0.05 INJECTION, SOLUTION INTRAMUSCULAR; INTRAVENOUS at 12:59

## 2023-11-26 RX ADMIN — HYDROMORPHONE HYDROCHLORIDE 0.5 MG: 1 INJECTION, SOLUTION INTRAMUSCULAR; INTRAVENOUS; SUBCUTANEOUS at 15:12

## 2023-11-26 RX ADMIN — Medication 2000 MG: at 03:22

## 2023-11-26 RX ADMIN — LORAZEPAM 0.5 MG: 0.5 TABLET ORAL at 16:10

## 2023-11-26 RX ADMIN — HYDROMORPHONE HYDROCHLORIDE 0.5 MG: 1 INJECTION, SOLUTION INTRAMUSCULAR; INTRAVENOUS; SUBCUTANEOUS at 05:01

## 2023-11-26 RX ADMIN — FENTANYL CITRATE 50 MCG: 0.05 INJECTION, SOLUTION INTRAMUSCULAR; INTRAVENOUS at 12:40

## 2023-11-26 RX ADMIN — LIDOCAINE HYDROCHLORIDE 50 MG: 10 INJECTION, SOLUTION EPIDURAL; INFILTRATION; INTRACAUDAL; PERINEURAL at 11:06

## 2023-11-26 RX ADMIN — DEXAMETHASONE SODIUM PHOSPHATE 4 MG: 10 INJECTION INTRAMUSCULAR; INTRAVENOUS at 11:40

## 2023-11-26 RX ADMIN — FLUOXETINE HYDROCHLORIDE 40 MG: 20 CAPSULE ORAL at 08:37

## 2023-11-26 RX ADMIN — PRAVASTATIN SODIUM 40 MG: 20 TABLET ORAL at 20:26

## 2023-11-26 RX ADMIN — DAKIN'S SOLUTION 0.125% (QUARTER STRENGTH): 0.12 SOLUTION at 13:38

## 2023-11-26 RX ADMIN — MIDAZOLAM 2 MG: 1 INJECTION INTRAMUSCULAR; INTRAVENOUS at 11:02

## 2023-11-26 RX ADMIN — PROPOFOL 150 MG: 10 INJECTION, EMULSION INTRAVENOUS at 11:07

## 2023-11-26 RX ADMIN — FENTANYL CITRATE 50 MCG: 50 INJECTION, SOLUTION INTRAMUSCULAR; INTRAVENOUS at 03:41

## 2023-11-26 RX ADMIN — OXYCODONE 10 MG: 5 TABLET ORAL at 18:09

## 2023-11-26 RX ADMIN — BUPROPION HYDROCHLORIDE 150 MG: 150 TABLET, EXTENDED RELEASE ORAL at 08:37

## 2023-11-26 RX ADMIN — LOSARTAN POTASSIUM 50 MG: 50 TABLET ORAL at 08:37

## 2023-11-26 RX ADMIN — HYDROMORPHONE HYDROCHLORIDE 0.5 MG: 1 INJECTION, SOLUTION INTRAMUSCULAR; INTRAVENOUS; SUBCUTANEOUS at 13:42

## 2023-11-26 RX ADMIN — INSULIN GLARGINE 10 UNITS: 100 INJECTION, SOLUTION SUBCUTANEOUS at 20:56

## 2023-11-26 RX ADMIN — HYDROMORPHONE HYDROCHLORIDE 0.5 MG: 1 INJECTION, SOLUTION INTRAMUSCULAR; INTRAVENOUS; SUBCUTANEOUS at 19:40

## 2023-11-26 RX ADMIN — POTASSIUM BICARBONATE 40 MEQ: 782 TABLET, EFFERVESCENT ORAL at 03:14

## 2023-11-26 RX ADMIN — FENTANYL CITRATE 100 MCG: 0.05 INJECTION, SOLUTION INTRAMUSCULAR; INTRAVENOUS at 11:07

## 2023-11-26 RX ADMIN — LORAZEPAM 2 MG: 2 INJECTION INTRAMUSCULAR; INTRAVENOUS at 05:19

## 2023-11-26 RX ADMIN — ACETAMINOPHEN 650 MG: 325 TABLET ORAL at 21:59

## 2023-11-26 RX ADMIN — HYDROMORPHONE HYDROCHLORIDE 0.5 MG: 1 INJECTION, SOLUTION INTRAMUSCULAR; INTRAVENOUS; SUBCUTANEOUS at 06:38

## 2023-11-26 RX ADMIN — FENTANYL CITRATE 50 MCG: 0.05 INJECTION, SOLUTION INTRAMUSCULAR; INTRAVENOUS at 12:39

## 2023-11-26 RX ADMIN — TIZANIDINE 4 MG: 4 TABLET ORAL at 20:25

## 2023-11-26 RX ADMIN — SODIUM CHLORIDE, PRESERVATIVE FREE 10 ML: 5 INJECTION INTRAVENOUS at 19:40

## 2023-11-26 RX ADMIN — HYDROMORPHONE HYDROCHLORIDE 0.5 MG: 1 INJECTION, SOLUTION INTRAMUSCULAR; INTRAVENOUS; SUBCUTANEOUS at 17:13

## 2023-11-26 RX ADMIN — Medication 2000 MG: at 11:41

## 2023-11-26 RX ADMIN — FONDAPARINUX SODIUM 10 MG: 5 INJECTION, SOLUTION SUBCUTANEOUS at 08:37

## 2023-11-26 RX ADMIN — METOPROLOL TARTRATE 25 MG: 25 TABLET, FILM COATED ORAL at 08:37

## 2023-11-26 RX ADMIN — SODIUM CHLORIDE, PRESERVATIVE FREE 10 ML: 5 INJECTION INTRAVENOUS at 08:37

## 2023-11-26 ASSESSMENT — PAIN DESCRIPTION - DESCRIPTORS
DESCRIPTORS: ACHING;DISCOMFORT
DESCRIPTORS: ACHING

## 2023-11-26 ASSESSMENT — LIFESTYLE VARIABLES
HOW MANY STANDARD DRINKS CONTAINING ALCOHOL DO YOU HAVE ON A TYPICAL DAY: PATIENT DOES NOT DRINK
HOW OFTEN DO YOU HAVE A DRINK CONTAINING ALCOHOL: NEVER

## 2023-11-26 ASSESSMENT — PAIN DESCRIPTION - LOCATION
LOCATION: LEG
LOCATION: HIP
LOCATION: HIP
LOCATION: LEG
LOCATION: HIP

## 2023-11-26 ASSESSMENT — PAIN DESCRIPTION - ORIENTATION
ORIENTATION: LEFT
ORIENTATION: LEFT;ANTERIOR
ORIENTATION: LEFT

## 2023-11-26 ASSESSMENT — PAIN SCALES - GENERAL
PAINLEVEL_OUTOF10: 10
PAINLEVEL_OUTOF10: 9
PAINLEVEL_OUTOF10: 4
PAINLEVEL_OUTOF10: 7
PAINLEVEL_OUTOF10: 7
PAINLEVEL_OUTOF10: 3
PAINLEVEL_OUTOF10: 10
PAINLEVEL_OUTOF10: 10
PAINLEVEL_OUTOF10: 9
PAINLEVEL_OUTOF10: 9
PAINLEVEL_OUTOF10: 10
PAINLEVEL_OUTOF10: 10
PAINLEVEL_OUTOF10: 9
PAINLEVEL_OUTOF10: 10
PAINLEVEL_OUTOF10: 9
PAINLEVEL_OUTOF10: 10
PAINLEVEL_OUTOF10: 9
PAINLEVEL_OUTOF10: 8

## 2023-11-26 ASSESSMENT — PAIN - FUNCTIONAL ASSESSMENT: PAIN_FUNCTIONAL_ASSESSMENT: ACTIVITIES ARE NOT PREVENTED

## 2023-11-26 ASSESSMENT — PAIN SCALES - WONG BAKER
WONGBAKER_NUMERICALRESPONSE: 2
WONGBAKER_NUMERICALRESPONSE: 2

## 2023-11-26 NOTE — CARE COORDINATION
Case Management Assessment  Initial Evaluation    Date/Time of Evaluation: 11/26/2023 10:59 AM  Assessment Completed by: Maldonado Delgado    If patient is discharged prior to next notation, then this note serves as note for discharge by case management. Patient Name: Cele Roman                   YOB: 1980  Diagnosis: Left hip pain [M25.552]  Left hip postoperative wound infection [T81.49XA]  Prosthetic hip infection, initial encounter ([address]) Lorenzo Mckenna, Z96.649]                   Date / Time: 11/26/2023  1:55 AM    Patient Admission Status: Inpatient   Readmission Risk (Low < 19, Mod (19-27), High > 27): Readmission Risk Score: 40.5    Current PCP: Bhargavi Reeves  PCP verified by CM? Yes     History Provided by: Patient  Patient Orientation: Alert and Oriented    Patient Cognition: Alert    Hospitalization in the last 30 days (Readmission):  Yes    If yes, Readmission Assessment in CM Navigator will be completed. Advance Directives:      Code Status: Full Code   Patient's Primary Decision Maker is: Legal Next of Kin    Primary Decision Maker: Brandin Welchelton - Parent - [phone]    Secondary Decision Maker: Kranthi Hackett Brother/Sister - [phone]    Discharge Planning:    Patient lives with:  Other (Comment) Type of Home: (P) [address] [facility] (trailer prior to SNF x 3 months)  Primary Care Giver: Self  Patient Support Systems include: Spouse/Significant Other   Current Financial resources: Medicare  Current community resources:    Current services prior to admission: Other (Comment)            Current DME:              Type of Home Care services:  None    ADLS  Prior functional level: Assistance with the following:, Bathing, Dressing, Toileting, Cooking, Housework, Shopping, Mobility  Current functional level: Mobility, Shopping, Housework, Cooking, Toileting, Dressing, Bathing, Assistance with the following:    PT AM-PAC:   /24  OT AM-PAC:   /24    Family can provide

## 2023-11-26 NOTE — PLAN OF CARE
[facility]  Office: [address], DO, Erin Dom, DO, Shasta Doles, DO, Ish Morkarl Blood, DO, Regis Hernandez MD, Nora Suarez MD, Nilda Lucas MD, Jovana Hudson MD,  Flores Nuñez MD, Carlota Thompson MD, Rusty Guardado MD,  Gloria Finney MD, Aylin Smith MD, Taiwo Finnegan, DO, Mitali Matamoros MD,  Violeta Cornell, DO, Man Stewart MD, Freida Doherty MD, Roxanne Harrington MD, Ester Osei MD,  Stevie Garcia MD, Stephany Barone MD, Braden Hou MD, Arik Moreno MD, Charmayne Ready, MD, Christine Samuel, DO, Jeffrey Marshall, DO, Glory Lanza MD,  Dom Diego MD, Sean Lloyd, CNP,  Natividad Ards, CNP, Marjorie Dickinson, CNP,  Laquita Baig, DNP, Margareth Anderson, CNP, Georgia Arreola, CNP, Sudhir Christensen, CNP, Claudell Bowler, CNP, [facility], CNP, Johnnia Manna, [address], Kevin, CNP, Moses Bauer, CNS, Nam Jara, CNP, Chantel Meza, CNP         [facility]    Second Visit Note  For more detailed information please refer to the progress note of the day      11/26/2023    5:02 PM    Name:   Ignacia Mariscal  MRN:     [id number]     [address]:      [de-identified]   Room:   2016/2016-01  IP Day:  0  Admit Date:  11/26/2023  1:55 AM    PCP:   Sandra Khan  Code Status:  Full Code    Patient has been seen  Patient reevaluated for incisional    The patient is postop:  Pre-Op Diagnosis Codes:   Dehiscence of incision, initial encounter Yung Wells   Post-Op Diagnosis: Dehiscence of incision, initial encounter Yung Wells     Procedure(s):  Irrigation and excisional debridement of skin down to and including the muscle of 40 x 15 cm wound to left proximal thigh  Application negative pressure wound VAC    She has developed significant bleeding from her incisional site  Wound VAC still in

## 2023-11-26 NOTE — CONSULTS
Orthopaedic Surgery Consult  (Dr. Paul Guerra)      CC/Reason for consult:  Left hip pain, s/p antibiotic spacer/prevena application 70/9/44    HPI:      The patient is a 39 y.o. female who is well known to the orthopedic surgery service. She was most recently seen for Left hip hardware removal, I&D, and placement of antibiotic spacer on 11/7/23 with Dr. Paul Guerra. She is now POD#19. Prior to that she has had multiple I&D of her left hip wound. Previous cultures have demonstrated MSSA and Bacteroides. She is currently on outpatient antibiotics (Rocephin, rifampin) until 11/25/23. The patient presented to the [facility] emergency department early this morning from [facility] in [place] due to increasing pain in her left hip along with specific areas along the edges of her incision which became firm over the past day or so. Patient states that starting on Thursday 11/24 she began to have greatly increased pain levels from her left hip along with a burning sensation. She states that she can feel this pain down in her foot and when her feet move this causes pain in the lateral aspect of her hip. She also states that she began feeling along her incision and did feel a very firm area anterior to the Prevena dressing. The pain continued to worsen throughout the day and she began having increased output in her Prevena canister. The patient states that she had been having around 100 mL of output on a daily basis that was serosanguineous in nature, however over the past 24 hours she had had approximately 250 output in the canister and the color had changed becoming lighter and less serosanguineous. Patient states that she has continued to receive outpatient Rocephin and rifampin through her PICC line per infectious disease, stop date of 12/7/23. She denies having any fever or chills. She denies any falls or trauma that led to this increase in pain.   She does note that she does have some dyesthesias in her

## 2023-11-26 NOTE — ED PROVIDER NOTES
[address] [place] [address] ED  Emergency Department Encounter  Emergency Medicine Resident     Pt Zeny York  MRN: [id number]  [address] [facility] N[place] 1980  Date of evaluation: 11/26/23  PCP:  Montana Gibbons  Note Started: 4:48 AM EST      CHIEF COMPLAINT       Chief Complaint   Patient presents with    Hip Pain    Post-op Problem       HISTORY OF PRESENT ILLNESS  (Location/Symptom, Timing/Onset, Context/Setting, Quality, Duration, Modifying Factors, Severity.)      Ailyn Casas is a 39 y.o. female who presents with complaints of left hip pain is gradually worsening over the past day and a half status post wound VAC application due to left hip surgical site infection status post arthroplasty 7/19/2023. Has required multiple revisions and removal of hardware. Notes some fever and chills. Notes some numbness that is worse to medial left foot status post surgery that was done on 11/7/2023. At that time, patient also had antibiotic spacer placed. Has had continued drainage from wound and wound VAC. Denies any nausea or vomiting. Notes oral Roxicodone's are not helping with pain. Has history of antiphospholipid syndrome and takes anticoagulation.     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

## 2023-11-26 NOTE — CONSULTS
[facility] - Initial Consult Note      Today's Date and Time: 11/26/2023, 9:49 AM    Impression :   S/p left hip cecilio arthroplasty 7-19-23  Left hip abscess/surgical wound dehiscence. Deep open wound - cx MSSA  Post initial I&D 8/21  and treatment of infection  Residual Left hip fluid collection s/p I&D with secondary closure 10/24/2023  Wound culture 10/24/2023 grew Staph aureus  MSSA  S/p removal of left hip prosthesis with placement of antibiotic spacer with irrigation and excisional debridement on 11/7/23  Wound culture with S aureus  Ceftriaxone continued. Rifampin added for synergy given prior lack of response with Ceftriaxone alone. Current admission 11-26-23 with concerns for relapse of infection with fullness and pain    Antiphospholipid syndrome  Alcohol abuse with alcoholic hepatitis without ascites  Supraventricular tachycardia  Anxiety  Bipolar disorder    Recommendations:     Continue Rocephin 2 gm IV q 12 hr for a total of 4 weeks. Stop date 12-7-23  Rifampin 600 mg po daily for synergy against S aureus until 12/7/23  Wound culture 10/24/2023 Staph aureus- MSSA  Wound Care  Surgical wound revision at the discretion of Orthopedics       Medical Decision Making/Summary/Discussion:11/26/2023     Patient with left hip cecilio arthroplasty 7-19-23  Subsequent Left hip abscess and surgical wound dehiscence on 8-21-23  Deep open wound - cx MSSA  Post initial I&D 8/21  S/P course of ceftriaxone as an outpatient  Persistent wound drainage leading to new I&D and wound closure on 10-24-23  S/P admission 11-7-23 through 11-15-23 when she underwent :  Irrigation and excisional debridement of skin down to including the bone of left hip. Removal of left hip prosthesis with placement of antibiotic spacer. Wound culture with S aureus  Ceftriaxone continued. Rifampin added for synergy given prior lack of response with Ceftriaxone alone.   Admission 11-26-23 with concerns for

## 2023-11-26 NOTE — ED NOTES
Pt present to triage via EMS, c/o Left hip pain. Pt states hip replacement 2 weeks ago, pt report of pain started yesterday, been taking her pain medication but pain just get worst overtime. Site is hard to touch, wound vac on the surgical site. Pt was given 50 mcg of fentanyl by EMS. Pt awake alert and oriented, not in distress, denies fever. Pt with PICC on her left arm. Pt history of fall last July went had femoral head fracture went to surgery for repair, pt said after the repair the surgical site got infection, went to reopen the surgical site but femoral head totally broke. Pt went to another surgery for total hip placement of femoral head two weeks ago. pt alert and oriented pain on the left hip 10/10.          Manuel Roca RN  11/26/23 5704

## 2023-11-26 NOTE — PLAN OF CARE
Problem: Discharge Planning  Goal: Discharge to home or other facility with appropriate resources  Outcome: Progressing  Flowsheets (Taken 11/26/2023 3540 by Jonh Mahmood RN)  Discharge to home or other facility with appropriate resources:   Identify barriers to discharge with patient and caregiver   Identify discharge learning needs (meds, wound care, etc)   Refer to discharge planning if patient needs post-hospital services based on physician order or complex needs related to functional status, cognitive ability or social support system   Arrange for needed discharge resources and transportation as appropriate   Arrange for interpreters to assist at discharge as needed     Problem: Pain  Goal: Verbalizes/displays adequate comfort level or baseline comfort level  Outcome: Progressing     Problem: ABCDS Injury Assessment  Goal: Absence of physical injury  Outcome: Progressing     Problem: Skin/Tissue Integrity  Goal: Absence of new skin breakdown  Description: 1. Monitor for areas of redness and/or skin breakdown  2. Assess vascular access sites hourly  3. Every 4-6 hours minimum:  Change oxygen saturation probe site  4. Every 4-6 hours:  If on nasal continuous positive airway pressure, respiratory therapy assess nares and determine need for appliance change or resting period.   Outcome: Progressing

## 2023-11-26 NOTE — PLAN OF CARE
Patient:  Ashish Breaux  YOB: 1980     39 y.o. female    Orthopedic post-operative instructions    Ashish Breaux is a 39 y.o. female who is being seen for:    Irrigation and excisional debridement of skin down to and including the muscle of 40 x 15 cm wound to left proximal thigh  Application negative pressure wound VAC    DOS: 11/26. POD#0    - Plan for OR with  on 11/28 or 11/30.  - Patient has long history regarding opoid abuse, recommend limiting IV pain medications as much as possible.   -Wound-Vac on LLE. Please measure and record output every shift. Okay to reinforce/maintain by nursing if necessary. Please notify Ortho if saturated or malfunctioning. Please maintain and keep clean and dry. Reinforce dressings as needed per nursing.  - WBAT  to the LLE  - Diet: Ok for Adult diet from ortho perspective   - Abx: Ancef 2g q8h x2 doses , Rifampin and Ceftriaxone per ID  - DVT ppx: Okay to start chemical anticoagulation POD#1   - PT/OT evaluation post-op.   - Pain control and medical management per primary  - Ice extremity for pain and swelling   -Please contact Ortho on call with any questions    Nisa Hernández,   Orthopedic Surgery Resident, PGY-1  [address] [place] [address]

## 2023-11-26 NOTE — DISCHARGE INSTRUCTIONS
You will be on IV and oral antibiotics until 12/21/2023    We have adjusted your pain medications to allow better control and quality of life. Hydrocodone before physical therapy in morning and prn at night for breakthrough. Not to be given within 2 hours of last pain med. We have discontinued pain patch and ativan. You will continue your percocet q4 hours. Do not take any tylenol without discussing with your medical team at facility. Orthopaedic Instructions:  -Weight bearing status: Weight bearing as tolerated with the left leg  -Do not remove dressings until your post-operative follow up visit. Please call the office if there are any issues with your wound vac. -Always look for signs of compartment syndrome: pain out of proportion to the injury, pain not controlled with pain medication, numbness in digits, changing of color of digits (paleness). If these signs occur return to ED immediately for reassessment.  -Always work on ankle and toe motion to decrease swelling.  -Ice (20 minutes on and off 1 hour) and elevate to reduce swelling and throbbing pain.  -Call the office or come to Emergency Room if signs of infection appear (hot, swollen, red, draining pus, fever)  -Take medications as prescribed.  -Wean off narcotics (percocet/norco) as soon as possible. Do not take tylenol if still taking narcotics.  -Follow up with Dr. Bhavik Mejia on 12/18 at 11:45. Call [phone]  to schedule/confirm or with any questions/concerns.

## 2023-11-26 NOTE — H&P
[facility]  Office: [id number], DO, Donavon Lobo, DO, Gildardo Laerick, DO, Jose C Stapleton, DO, Margarita Gonzales MD, Emily Llanes MD, Deidre Fulton MD, Amber Soliz MD,  Oscar Garber MD, Melissa Wolfe MD, Meagan Flowers MD,  Anne Avery MD, Stephanie Camejo MD, Brock Buckley DO, Tien Sierra MD,  Ilia Staton DO, Leona Eisenmenger, MD, Burt Paz MD, Saige Alvarado MD, Cheryle Spillers, MD,  Luis Soriano MD, Luz Flores MD, Jessica Richardson MD, Diana Brannon MD, Hiram Pinzon MD, Torrie Sanchez, DO, Guerda Mccartney, DO, Jass Tohrpe MD,  Kirby Chi MD, Elayne Haider, CNP,  Nishant Sow, CNP, Toya Kurtz, CNP,  Zeynep Retana, [facility], Lera Sandhoff, CNP, Kyung Brooks, CNP, Leo Jessica, CNP, Rebeca Stafford, CNP, Funmilayo Ibanez, CNP, Krista Perez, [address] , Farida Downs, CNP, Gurjit Palacios, CNS, Víctor Chen, CNP, Estuardo Singleton, [facility], [facility]    HISTORY AND PHYSICAL EXAMINATION            Date:   11/26/2023  Patient name:  Yeyo Morales  Date of admission:  11/26/2023  1:55 AM  MRN:   [id number]  Account:  [de-identified]  YOB: 1980  PCP:    Nicci Escoto  Room:   2016/2016-01  Code Status:    Full Code    Chief Complaint:     Chief Complaint   Patient presents with    Hip Pain    Post-op Problem     History Obtained From:     patient, electronic medical record    History of Present Illness:      This is a 41-year-old female with a significant past medical history of hypertension, type 2 diabetes mellitus, GERD, PTSD, bipolar disorder with depression and anxiety, antiphospholipid antibody syndrome with history of DVT and PE, alcohol use disorder and recent hip repair with hardware removal and I&D and placement of antibiotic spacer on 11/7/2023 with previous blood cultures positive for MSSA and Bacteroides discharged on Rocephin and rifampin until

## 2023-11-26 NOTE — BRIEF OP NOTE
Brief Postoperative Note      Patient: Madison Ramirez  YOB: 1980  MRN: [id number]    Date of Procedure: 11/26/2023    Pre-Op Diagnosis Codes:   Dehiscence of incision, initial encounter Weldon [place]    Post-Op Diagnosis: Dehiscence of incision, initial encounter Weldontl Kim       Procedure(s):  Irrigation and excisional debridement of skin down to and including the muscle of 40 x 15 cm wound to left proximal thigh  Application negative pressure wound VAC      Surgeon(s):  Parvez Isaacs DO    Assistant:  Resident: Sena Jc MD; Allen Lizarraga DO    Anesthesia: General    Estimated Blood Loss (mL): 50 cc    Fluids: 800 cc crystalloid     Complications: None    Specimens:   * No specimens in log *    Implants:  * No implants in log *      Drains:   Negative Pressure Wound Therapy Hip Left;Proximal;Upper;Dorsal (Active)       [REMOVED] Closed/Suction Drain Left;Proximal;Anterior Thigh Accordion (Removed)   Site Description Clean, dry & intact 11/09/23 2045   Dressing Status Clean, dry & intact 11/09/23 2045   Drainage Appearance Bright red;Bloody 11/10/23 0930   Drain Status Compressed 11/10/23 0930   Output (ml) 30 ml 11/10/23 0450       [REMOVED] Closed/Suction Drain Left; Anterior;Distal Thigh Accordion (Removed)   Site Description Unable to view 11/14/23 0200   Dressing Status Clean, dry & intact 11/14/23 0200   Drainage Appearance Bloody 11/14/23 0200   Drain Status Compressed 11/14/23 0200   Output (ml) 5 ml 11/14/23 0505       [REMOVED] Negative Pressure Wound Therapy Leg Left;Upper;Dorsal (Removed)       [REMOVED] Negative Pressure Wound Therapy Leg Left;Proximal;Upper (Removed)   Wound Type Surgical 11/26/23 0800   Dressing Type Other (Comment) 11/26/23 0800   Cycle Continuous 11/26/23 0800   Target Pressure (mmHg) 125 11/26/23 0800   Dressing Status Clean, dry & intact 11/26/23 0800   Drainage Amount Small 11/26/23 0800   Drainage Description Serosanguinous

## 2023-11-26 NOTE — ED NOTES
Rolled pt on her side, surgery resident removed the wound vac and its dressing. Reapplied dressing without the wound vac. Surgical site noted with pus formation. Noted with stage two pressure ulcer on coccyx area. Applied derm patch on the pressure ulcer.        Wanda Barber RN  11/26/23 3437

## 2023-11-27 LAB
ANION GAP SERPL CALCULATED.3IONS-SCNC: 10 MMOL/L (ref 9–17)
BUN SERPL-MCNC: 8 MG/DL (ref 6–20)
CALCIUM SERPL-MCNC: 8 MG/DL (ref 8.6–10.4)
CHLORIDE SERPL-SCNC: 102 MMOL/L (ref 98–107)
CO2 SERPL-SCNC: 24 MMOL/L (ref 20–31)
CREAT SERPL-MCNC: 0.4 MG/DL (ref 0.5–0.9)
ERYTHROCYTE [DISTWIDTH] IN BLOOD BY AUTOMATED COUNT: 16.6 % (ref 11.8–14.4)
GFR SERPL CREATININE-BSD FRML MDRD: >60 ML/MIN/1.73M2
GLUCOSE BLD-MCNC: 150 MG/DL (ref 65–105)
GLUCOSE BLD-MCNC: 157 MG/DL (ref 65–105)
GLUCOSE BLD-MCNC: 179 MG/DL (ref 65–105)
GLUCOSE BLD-MCNC: 190 MG/DL (ref 65–105)
GLUCOSE SERPL-MCNC: 178 MG/DL (ref 70–99)
HCT VFR BLD AUTO: 24.6 % (ref 36.3–47.1)
HCT VFR BLD AUTO: 24.6 % (ref 36.3–47.1)
HCT VFR BLD AUTO: 29.7 % (ref 36.3–47.1)
HGB BLD-MCNC: 7.4 G/DL (ref 11.9–15.1)
HGB BLD-MCNC: 7.6 G/DL (ref 11.9–15.1)
HGB BLD-MCNC: 8.5 G/DL (ref 11.9–15.1)
INR PPP: 1.1
MAGNESIUM SERPL-MCNC: 1.5 MG/DL (ref 1.6–2.6)
MCH RBC QN AUTO: 29.5 PG (ref 25.2–33.5)
MCHC RBC AUTO-ENTMCNC: 30.1 G/DL (ref 28.4–34.8)
MCV RBC AUTO: 98 FL (ref 82.6–102.9)
MICROORGANISM SPEC CULT: NORMAL
MICROORGANISM/AGENT SPEC: NORMAL
NRBC BLD-RTO: 0 PER 100 WBC
PLATELET # BLD AUTO: 481 K/UL (ref 138–453)
PMV BLD AUTO: 10.7 FL (ref 8.1–13.5)
POTASSIUM SERPL-SCNC: 4 MMOL/L (ref 3.7–5.3)
PROTHROMBIN TIME: 13.5 SEC (ref 11.7–14.9)
RBC # BLD AUTO: 2.51 M/UL (ref 3.95–5.11)
SODIUM SERPL-SCNC: 136 MMOL/L (ref 135–144)
SPECIMEN DESCRIPTION: NORMAL
WBC OTHER # BLD: 9.3 K/UL (ref 3.5–11.3)

## 2023-11-27 PROCEDURE — 36415 COLL VENOUS BLD VENIPUNCTURE: CPT

## 2023-11-27 PROCEDURE — 6370000000 HC RX 637 (ALT 250 FOR IP): Performed by: STUDENT IN AN ORGANIZED HEALTH CARE EDUCATION/TRAINING PROGRAM

## 2023-11-27 PROCEDURE — 2580000003 HC RX 258: Performed by: STUDENT IN AN ORGANIZED HEALTH CARE EDUCATION/TRAINING PROGRAM

## 2023-11-27 PROCEDURE — 6360000002 HC RX W HCPCS

## 2023-11-27 PROCEDURE — 1200000000 HC SEMI PRIVATE

## 2023-11-27 PROCEDURE — 85018 HEMOGLOBIN: CPT

## 2023-11-27 PROCEDURE — 6370000000 HC RX 637 (ALT 250 FOR IP)

## 2023-11-27 PROCEDURE — 85014 HEMATOCRIT: CPT

## 2023-11-27 PROCEDURE — 99232 SBSQ HOSP IP/OBS MODERATE 35: CPT | Performed by: STUDENT IN AN ORGANIZED HEALTH CARE EDUCATION/TRAINING PROGRAM

## 2023-11-27 PROCEDURE — 2580000003 HC RX 258

## 2023-11-27 PROCEDURE — 87040 BLOOD CULTURE FOR BACTERIA: CPT

## 2023-11-27 PROCEDURE — 83735 ASSAY OF MAGNESIUM: CPT

## 2023-11-27 PROCEDURE — 82947 ASSAY GLUCOSE BLOOD QUANT: CPT

## 2023-11-27 PROCEDURE — 85610 PROTHROMBIN TIME: CPT

## 2023-11-27 PROCEDURE — 84630 ASSAY OF ZINC: CPT

## 2023-11-27 PROCEDURE — 80048 BASIC METABOLIC PNL TOTAL CA: CPT

## 2023-11-27 PROCEDURE — 85027 COMPLETE CBC AUTOMATED: CPT

## 2023-11-27 PROCEDURE — 99232 SBSQ HOSP IP/OBS MODERATE 35: CPT | Performed by: INTERNAL MEDICINE

## 2023-11-27 RX ORDER — SODIUM CHLORIDE 9 MG/ML
INJECTION, SOLUTION INTRAVENOUS CONTINUOUS
Status: ACTIVE | OUTPATIENT
Start: 2023-11-27 | End: 2023-11-28

## 2023-11-27 RX ORDER — ACETAMINOPHEN 500 MG
1000 TABLET ORAL EVERY 6 HOURS SCHEDULED
Status: DISCONTINUED | OUTPATIENT
Start: 2023-11-27 | End: 2023-12-08

## 2023-11-27 RX ADMIN — INSULIN GLARGINE 10 UNITS: 100 INJECTION, SOLUTION SUBCUTANEOUS at 20:22

## 2023-11-27 RX ADMIN — OXYCODONE 10 MG: 5 TABLET ORAL at 14:03

## 2023-11-27 RX ADMIN — ACETAMINOPHEN 1000 MG: 500 TABLET ORAL at 12:36

## 2023-11-27 RX ADMIN — ACETAMINOPHEN 650 MG: 325 TABLET ORAL at 05:49

## 2023-11-27 RX ADMIN — BUPROPION HYDROCHLORIDE 150 MG: 150 TABLET, EXTENDED RELEASE ORAL at 08:52

## 2023-11-27 RX ADMIN — Medication 2000 MG: at 05:46

## 2023-11-27 RX ADMIN — HYDROMORPHONE HYDROCHLORIDE 0.5 MG: 1 INJECTION, SOLUTION INTRAMUSCULAR; INTRAVENOUS; SUBCUTANEOUS at 20:22

## 2023-11-27 RX ADMIN — LORAZEPAM 0.5 MG: 0.5 TABLET ORAL at 22:16

## 2023-11-27 RX ADMIN — OXYCODONE 10 MG: 5 TABLET ORAL at 01:53

## 2023-11-27 RX ADMIN — LORAZEPAM 0.5 MG: 0.5 TABLET ORAL at 03:02

## 2023-11-27 RX ADMIN — MAGNESIUM SULFATE HEPTAHYDRATE 1000 MG: 1 INJECTION, SOLUTION INTRAVENOUS at 15:12

## 2023-11-27 RX ADMIN — OXYCODONE 10 MG: 5 TABLET ORAL at 10:08

## 2023-11-27 RX ADMIN — PRAVASTATIN SODIUM 40 MG: 20 TABLET ORAL at 20:23

## 2023-11-27 RX ADMIN — OXYCODONE 10 MG: 5 TABLET ORAL at 05:48

## 2023-11-27 RX ADMIN — HYDROMORPHONE HYDROCHLORIDE 0.5 MG: 1 INJECTION, SOLUTION INTRAMUSCULAR; INTRAVENOUS; SUBCUTANEOUS at 03:33

## 2023-11-27 RX ADMIN — HYDROMORPHONE HYDROCHLORIDE 0.5 MG: 1 INJECTION, SOLUTION INTRAMUSCULAR; INTRAVENOUS; SUBCUTANEOUS at 12:36

## 2023-11-27 RX ADMIN — Medication 2000 MG: at 12:36

## 2023-11-27 RX ADMIN — HYDROMORPHONE HYDROCHLORIDE 0.5 MG: 1 INJECTION, SOLUTION INTRAMUSCULAR; INTRAVENOUS; SUBCUTANEOUS at 08:43

## 2023-11-27 RX ADMIN — INSULIN GLARGINE 24 UNITS: 100 INJECTION, SOLUTION SUBCUTANEOUS at 08:52

## 2023-11-27 RX ADMIN — RIFAMPIN 600 MG: 300 CAPSULE ORAL at 08:52

## 2023-11-27 RX ADMIN — LORAZEPAM 0.5 MG: 0.5 TABLET ORAL at 12:36

## 2023-11-27 RX ADMIN — FLUOXETINE HYDROCHLORIDE 40 MG: 20 CAPSULE ORAL at 08:52

## 2023-11-27 RX ADMIN — HYDROMORPHONE HYDROCHLORIDE 0.5 MG: 1 INJECTION, SOLUTION INTRAMUSCULAR; INTRAVENOUS; SUBCUTANEOUS at 16:16

## 2023-11-27 RX ADMIN — SODIUM CHLORIDE: 9 INJECTION, SOLUTION INTRAVENOUS at 15:11

## 2023-11-27 RX ADMIN — SODIUM CHLORIDE, PRESERVATIVE FREE 10 ML: 5 INJECTION INTRAVENOUS at 20:23

## 2023-11-27 RX ADMIN — DAKIN'S SOLUTION 0.125% (QUARTER STRENGTH): 0.12 SOLUTION at 09:00

## 2023-11-27 RX ADMIN — SODIUM CHLORIDE, PRESERVATIVE FREE 10 ML: 5 INJECTION INTRAVENOUS at 08:52

## 2023-11-27 RX ADMIN — METOPROLOL TARTRATE 25 MG: 25 TABLET, FILM COATED ORAL at 08:52

## 2023-11-27 RX ADMIN — OXYCODONE 10 MG: 5 TABLET ORAL at 22:16

## 2023-11-27 RX ADMIN — ACETAMINOPHEN 1000 MG: 500 TABLET ORAL at 18:03

## 2023-11-27 RX ADMIN — MAGNESIUM SULFATE HEPTAHYDRATE 1000 MG: 1 INJECTION, SOLUTION INTRAVENOUS at 18:03

## 2023-11-27 RX ADMIN — OXYCODONE 10 MG: 5 TABLET ORAL at 18:03

## 2023-11-27 RX ADMIN — LOSARTAN POTASSIUM 50 MG: 50 TABLET ORAL at 08:52

## 2023-11-27 ASSESSMENT — PAIN DESCRIPTION - ORIENTATION
ORIENTATION: LEFT

## 2023-11-27 ASSESSMENT — PAIN SCALES - GENERAL
PAINLEVEL_OUTOF10: 7
PAINLEVEL_OUTOF10: 9
PAINLEVEL_OUTOF10: 7
PAINLEVEL_OUTOF10: 7
PAINLEVEL_OUTOF10: 8
PAINLEVEL_OUTOF10: 7
PAINLEVEL_OUTOF10: 8
PAINLEVEL_OUTOF10: 8
PAINLEVEL_OUTOF10: 7
PAINLEVEL_OUTOF10: 7
PAINLEVEL_OUTOF10: 8
PAINLEVEL_OUTOF10: 7
PAINLEVEL_OUTOF10: 8
PAINLEVEL_OUTOF10: 9
PAINLEVEL_OUTOF10: 7
PAINLEVEL_OUTOF10: 7

## 2023-11-27 ASSESSMENT — PAIN DESCRIPTION - DESCRIPTORS
DESCRIPTORS: BURNING;STABBING
DESCRIPTORS: BURNING;STABBING
DESCRIPTORS: ACHING
DESCRIPTORS: STABBING;SHARP
DESCRIPTORS: ACHING
DESCRIPTORS: STABBING;BURNING

## 2023-11-27 ASSESSMENT — PAIN DESCRIPTION - LOCATION
LOCATION: HIP

## 2023-11-27 ASSESSMENT — PAIN DESCRIPTION - PAIN TYPE: TYPE: SURGICAL PAIN

## 2023-11-27 ASSESSMENT — PAIN DESCRIPTION - FREQUENCY: FREQUENCY: CONTINUOUS

## 2023-11-27 NOTE — CARE COORDINATION
Spoke to Carolina Quan from [facility]. They are able to accept patient at discharge.  Notified her that patient has a wound vac at this time

## 2023-11-27 NOTE — CONSULTS
Comprehensive Nutrition Assessment    Type and Reason for Visit:  Consult    Nutrition Recommendations/Plan:   Continue current diet and ONS  Recommend add wound healing supplement  Monitor weight, labs and intake. Malnutrition Assessment:  Malnutrition Status: At risk for malnutrition (Comment) (11/27/23 0348)        Nutrition Assessment:    Consult for ONS. Patient admitted for prosthetic hip infection. PMH bipolar disorder, T2DM, anxiety. Paient reports poor intake yesterday r/t pain, states she ate a pancake this morning now that her pain is under control. She reports she will drink glucerna, is agreeable to Mushtaq QD for wound healing as well. Encouraged patient to have good intake of high protein foods as tolerated. Patient states understanding. glucose 150-178. Mg 1.5. Meds reviewed. Nutrition Related Findings:    labs/meds reviewed Wound Type: Surgical Incision, Wound Vac       Current Nutrition Intake & Therapies:    Average Meal Intake: 26-50%  Average Supplements Intake: %  ADULT DIET; Regular  ADULT ORAL NUTRITION SUPPLEMENT; Breakfast, Dinner; Diabetic Oral Supplement  ADULT ORAL NUTRITION SUPPLEMENT; Lunch; Wound Healing Oral Supplement    Anthropometric Measures:  Height: 172.7 cm (5' 7.99\")  Ideal Body Weight (IBW): 140 lbs (64 kg)       Current Body Weight: 102.4 kg (225 lb 12 oz), 161.3 % IBW. Weight Source: Bed Scale  Current BMI (kg/m2): 34.3                          BMI Categories: Obese Class 1 (BMI 30.0-34. 9)    Estimated Daily Nutrient Needs:  Energy Requirements Based On: Kcal/kg  Weight Used for Energy Requirements: Ideal  Energy (kcal/day): 1800 kcal/day  Weight Used for Protein Requirements: Ideal  Protein (g/day): 80-85 gm protein  Method Used for Fluid Requirements: Other (Comment)  Fluid (ml/day): per MD    Nutrition Diagnosis:   Inadequate protein intake related to increase demand for energy/nutrients as evidenced by wounds    Nutrition Interventions:   Food and/or

## 2023-11-27 NOTE — CONSENT
Informed Consent for Blood Component Transfusion Note    I have discussed with the patient the rationale for blood component transfusion; its benefits in treating or preventing fatigue, organ damage, or death; and its risk which includes mild transfusion reactions, rare risk of blood borne infection, or more serious but rare reactions. I have discussed the alternatives to transfusion, including the risk and consequences of not receiving transfusion. The patient had an opportunity to ask questions and had agreed to proceed with transfusion of blood components.     Electronically signed by Laquita Gonzales DO on 11/27/23 at 9:16 AM EST

## 2023-11-27 NOTE — FLOWSHEET NOTE
Ortho residents at bedside, added additional suction disk to NPWT. Sanguinous drainage noted. Patient tolerated well. Complains of continued pain.

## 2023-11-27 NOTE — CARE COORDINATION
11/27/23 1644   Readmission Assessment   Number of Days since last admission? 8-30 days   Previous Disposition SNF   Who is being Interviewed Patient   What was the patient's/caregiver's perception as to why they think they needed to return back to the hospital? Other (Comment)  (hip pain)   Did you visit your Primary Care Physician after you left the hospital, before you returned this time? No   Why weren't you able to visit your PCP? Other (Comment)  (saw Dr at [facility])   Did you see a specialist, such as Cardiac, Pulmonary, Orthopedic Physician, etc. after you left the hospital? Yes  (telephone)   Who advised the patient to return to the hospital? Skilled Unit   Does the patient report anything that got in the way of taking their medications? No   In our efforts to provide the best possible care to you and others like you, can you think of anything that we could have done to help you after you left the hospital the first time, so that you might not have needed to return so soon?  Other (Comment)  (help with infection, pain)

## 2023-11-28 LAB
ANION GAP SERPL CALCULATED.3IONS-SCNC: 9 MMOL/L (ref 9–17)
BUN SERPL-MCNC: 8 MG/DL (ref 6–20)
CALCIUM SERPL-MCNC: 7.9 MG/DL (ref 8.6–10.4)
CHLORIDE SERPL-SCNC: 105 MMOL/L (ref 98–107)
CO2 SERPL-SCNC: 22 MMOL/L (ref 20–31)
CREAT SERPL-MCNC: 0.3 MG/DL (ref 0.5–0.9)
CRP SERPL HS-MCNC: 140.4 MG/L (ref 0–5)
GFR SERPL CREATININE-BSD FRML MDRD: >60 ML/MIN/1.73M2
GLUCOSE BLD-MCNC: 121 MG/DL (ref 65–105)
GLUCOSE BLD-MCNC: 137 MG/DL (ref 65–105)
GLUCOSE BLD-MCNC: 172 MG/DL (ref 65–105)
GLUCOSE BLD-MCNC: 200 MG/DL (ref 65–105)
GLUCOSE SERPL-MCNC: 160 MG/DL (ref 70–99)
HCT VFR BLD AUTO: 21.3 % (ref 36.3–47.1)
HCT VFR BLD AUTO: 22.7 % (ref 36.3–47.1)
HCT VFR BLD AUTO: 24.7 % (ref 36.3–47.1)
HGB BLD-MCNC: 6.8 G/DL (ref 11.9–15.1)
HGB BLD-MCNC: 6.9 G/DL (ref 11.9–15.1)
HGB BLD-MCNC: 7.2 G/DL (ref 11.9–15.1)
MAGNESIUM SERPL-MCNC: 2.4 MG/DL (ref 1.6–2.6)
POTASSIUM SERPL-SCNC: 4.3 MMOL/L (ref 3.7–5.3)
SODIUM SERPL-SCNC: 136 MMOL/L (ref 135–144)

## 2023-11-28 PROCEDURE — 1200000000 HC SEMI PRIVATE

## 2023-11-28 PROCEDURE — 6370000000 HC RX 637 (ALT 250 FOR IP): Performed by: STUDENT IN AN ORGANIZED HEALTH CARE EDUCATION/TRAINING PROGRAM

## 2023-11-28 PROCEDURE — 97162 PT EVAL MOD COMPLEX 30 MIN: CPT

## 2023-11-28 PROCEDURE — 99232 SBSQ HOSP IP/OBS MODERATE 35: CPT | Performed by: HOSPITALIST

## 2023-11-28 PROCEDURE — 6370000000 HC RX 637 (ALT 250 FOR IP)

## 2023-11-28 PROCEDURE — 97530 THERAPEUTIC ACTIVITIES: CPT

## 2023-11-28 PROCEDURE — 85018 HEMOGLOBIN: CPT

## 2023-11-28 PROCEDURE — P9016 RBC LEUKOCYTES REDUCED: HCPCS

## 2023-11-28 PROCEDURE — 86901 BLOOD TYPING SEROLOGIC RH(D): CPT

## 2023-11-28 PROCEDURE — 99232 SBSQ HOSP IP/OBS MODERATE 35: CPT | Performed by: INTERNAL MEDICINE

## 2023-11-28 PROCEDURE — 6360000002 HC RX W HCPCS

## 2023-11-28 PROCEDURE — 97166 OT EVAL MOD COMPLEX 45 MIN: CPT

## 2023-11-28 PROCEDURE — 2580000003 HC RX 258

## 2023-11-28 PROCEDURE — 36430 TRANSFUSION BLD/BLD COMPNT: CPT

## 2023-11-28 PROCEDURE — 82947 ASSAY GLUCOSE BLOOD QUANT: CPT

## 2023-11-28 PROCEDURE — 80048 BASIC METABOLIC PNL TOTAL CA: CPT

## 2023-11-28 PROCEDURE — 86140 C-REACTIVE PROTEIN: CPT

## 2023-11-28 PROCEDURE — 85014 HEMATOCRIT: CPT

## 2023-11-28 PROCEDURE — 83735 ASSAY OF MAGNESIUM: CPT

## 2023-11-28 PROCEDURE — 97112 NEUROMUSCULAR REEDUCATION: CPT

## 2023-11-28 PROCEDURE — 86920 COMPATIBILITY TEST SPIN: CPT

## 2023-11-28 PROCEDURE — 97535 SELF CARE MNGMENT TRAINING: CPT

## 2023-11-28 PROCEDURE — 97116 GAIT TRAINING THERAPY: CPT

## 2023-11-28 PROCEDURE — 36416 COLLJ CAPILLARY BLOOD SPEC: CPT

## 2023-11-28 PROCEDURE — 86900 BLOOD TYPING SEROLOGIC ABO: CPT

## 2023-11-28 PROCEDURE — 86850 RBC ANTIBODY SCREEN: CPT

## 2023-11-28 PROCEDURE — 36415 COLL VENOUS BLD VENIPUNCTURE: CPT

## 2023-11-28 RX ORDER — SODIUM CHLORIDE 9 MG/ML
INJECTION, SOLUTION INTRAVENOUS PRN
Status: DISCONTINUED | OUTPATIENT
Start: 2023-11-28 | End: 2023-12-10 | Stop reason: HOSPADM

## 2023-11-28 RX ADMIN — ACETAMINOPHEN 1000 MG: 500 TABLET ORAL at 18:06

## 2023-11-28 RX ADMIN — INSULIN GLARGINE 10 UNITS: 100 INJECTION, SOLUTION SUBCUTANEOUS at 22:25

## 2023-11-28 RX ADMIN — OXYCODONE 10 MG: 5 TABLET ORAL at 15:13

## 2023-11-28 RX ADMIN — LOSARTAN POTASSIUM 50 MG: 50 TABLET ORAL at 08:44

## 2023-11-28 RX ADMIN — OXYCODONE 10 MG: 5 TABLET ORAL at 06:35

## 2023-11-28 RX ADMIN — HYDROMORPHONE HYDROCHLORIDE 0.5 MG: 1 INJECTION, SOLUTION INTRAMUSCULAR; INTRAVENOUS; SUBCUTANEOUS at 22:21

## 2023-11-28 RX ADMIN — ACETAMINOPHEN 1000 MG: 500 TABLET ORAL at 00:21

## 2023-11-28 RX ADMIN — INSULIN GLARGINE 24 UNITS: 100 INJECTION, SOLUTION SUBCUTANEOUS at 08:43

## 2023-11-28 RX ADMIN — SODIUM CHLORIDE, PRESERVATIVE FREE 10 ML: 5 INJECTION INTRAVENOUS at 22:21

## 2023-11-28 RX ADMIN — OXYCODONE 10 MG: 5 TABLET ORAL at 02:34

## 2023-11-28 RX ADMIN — HYDROMORPHONE HYDROCHLORIDE 0.5 MG: 1 INJECTION, SOLUTION INTRAMUSCULAR; INTRAVENOUS; SUBCUTANEOUS at 12:53

## 2023-11-28 RX ADMIN — Medication 2000 MG: at 13:01

## 2023-11-28 RX ADMIN — ACETAMINOPHEN 1000 MG: 500 TABLET ORAL at 06:35

## 2023-11-28 RX ADMIN — ACETAMINOPHEN 1000 MG: 500 TABLET ORAL at 12:53

## 2023-11-28 RX ADMIN — HYDROMORPHONE HYDROCHLORIDE 0.5 MG: 1 INJECTION, SOLUTION INTRAMUSCULAR; INTRAVENOUS; SUBCUTANEOUS at 04:22

## 2023-11-28 RX ADMIN — OXYCODONE 10 MG: 5 TABLET ORAL at 19:50

## 2023-11-28 RX ADMIN — PRAVASTATIN SODIUM 40 MG: 20 TABLET ORAL at 22:22

## 2023-11-28 RX ADMIN — Medication 2000 MG: at 00:21

## 2023-11-28 RX ADMIN — RIFAMPIN 600 MG: 300 CAPSULE ORAL at 08:44

## 2023-11-28 RX ADMIN — SODIUM CHLORIDE, PRESERVATIVE FREE 10 ML: 5 INJECTION INTRAVENOUS at 08:43

## 2023-11-28 RX ADMIN — HYDROMORPHONE HYDROCHLORIDE 0.5 MG: 1 INJECTION, SOLUTION INTRAMUSCULAR; INTRAVENOUS; SUBCUTANEOUS at 08:44

## 2023-11-28 RX ADMIN — LORAZEPAM 0.5 MG: 0.5 TABLET ORAL at 12:00

## 2023-11-28 RX ADMIN — OXYCODONE 10 MG: 5 TABLET ORAL at 10:44

## 2023-11-28 RX ADMIN — BUPROPION HYDROCHLORIDE 150 MG: 150 TABLET, EXTENDED RELEASE ORAL at 08:44

## 2023-11-28 RX ADMIN — HYDROMORPHONE HYDROCHLORIDE 0.5 MG: 1 INJECTION, SOLUTION INTRAMUSCULAR; INTRAVENOUS; SUBCUTANEOUS at 00:21

## 2023-11-28 RX ADMIN — METOPROLOL TARTRATE 25 MG: 25 TABLET, FILM COATED ORAL at 08:44

## 2023-11-28 RX ADMIN — HYDROMORPHONE HYDROCHLORIDE 0.5 MG: 1 INJECTION, SOLUTION INTRAMUSCULAR; INTRAVENOUS; SUBCUTANEOUS at 17:40

## 2023-11-28 RX ADMIN — FLUOXETINE HYDROCHLORIDE 40 MG: 20 CAPSULE ORAL at 08:44

## 2023-11-28 ASSESSMENT — PAIN DESCRIPTION - LOCATION
LOCATION: HIP
LOCATION: LEG
LOCATION: HIP
LOCATION: LEG
LOCATION: HIP
LOCATION: LEG
LOCATION: HIP

## 2023-11-28 ASSESSMENT — PAIN DESCRIPTION - DESCRIPTORS
DESCRIPTORS: BURNING;STABBING
DESCRIPTORS: BURNING;SHARP
DESCRIPTORS: BURNING;SHARP
DESCRIPTORS: ACHING;DISCOMFORT
DESCRIPTORS: BURNING;SHARP;STABBING
DESCRIPTORS: STABBING;BURNING
DESCRIPTORS: ACHING;DISCOMFORT
DESCRIPTORS: BURNING;STABBING
DESCRIPTORS: ACHING;DISCOMFORT

## 2023-11-28 ASSESSMENT — PAIN SCALES - GENERAL
PAINLEVEL_OUTOF10: 8
PAINLEVEL_OUTOF10: 6
PAINLEVEL_OUTOF10: 8
PAINLEVEL_OUTOF10: 7
PAINLEVEL_OUTOF10: 7
PAINLEVEL_OUTOF10: 8
PAINLEVEL_OUTOF10: 8
PAINLEVEL_OUTOF10: 7
PAINLEVEL_OUTOF10: 7
PAINLEVEL_OUTOF10: 8
PAINLEVEL_OUTOF10: 8
PAINLEVEL_OUTOF10: 7
PAINLEVEL_OUTOF10: 6
PAINLEVEL_OUTOF10: 8
PAINLEVEL_OUTOF10: 7
PAINLEVEL_OUTOF10: 7
PAINLEVEL_OUTOF10: 9
PAINLEVEL_OUTOF10: 10

## 2023-11-28 ASSESSMENT — PAIN DESCRIPTION - ONSET
ONSET: ON-GOING
ONSET: ON-GOING

## 2023-11-28 ASSESSMENT — PAIN DESCRIPTION - ORIENTATION
ORIENTATION: LEFT

## 2023-11-28 ASSESSMENT — PAIN - FUNCTIONAL ASSESSMENT
PAIN_FUNCTIONAL_ASSESSMENT: PREVENTS OR INTERFERES WITH ALL ACTIVE AND SOME PASSIVE ACTIVITIES

## 2023-11-28 ASSESSMENT — PAIN DESCRIPTION - FREQUENCY
FREQUENCY: CONTINUOUS
FREQUENCY: CONTINUOUS

## 2023-11-28 ASSESSMENT — PAIN DESCRIPTION - PAIN TYPE
TYPE: SURGICAL PAIN

## 2023-11-28 NOTE — PLAN OF CARE
Problem: Discharge Planning  Goal: Discharge to home or other facility with appropriate resources  11/28/2023 1104 by Meng Velasco RN  Outcome: Progressing  11/28/2023 0202 by Juan Prince RN  Outcome: Progressing     Problem: Pain  Goal: Verbalizes/displays adequate comfort level or baseline comfort level  11/28/2023 1104 by Meng Velasco RN  Outcome: Progressing  11/28/2023 0202 by Juan Prince RN  Outcome: Progressing     Problem: Safety - Adult  Goal: Free from fall injury  11/28/2023 1104 by Meng Velasco RN  Outcome: Progressing  11/28/2023 0202 by Juan Prince RN  Outcome: Progressing     Problem: ABCDS Injury Assessment  Goal: Absence of physical injury  11/28/2023 1104 by Meng Velasco RN  Outcome: Progressing  11/28/2023 0202 by Juan Prince RN  Outcome: Progressing     Problem: Skin/Tissue Integrity  Goal: Absence of new skin breakdown  Description: 1. Monitor for areas of redness and/or skin breakdown  2. Assess vascular access sites hourly  3. Every 4-6 hours minimum:  Change oxygen saturation probe site  4. Every 4-6 hours:  If on nasal continuous positive airway pressure, respiratory therapy assess nares and determine need for appliance change or resting period.   11/28/2023 1104 by Meng Velasco RN  Outcome: Progressing  11/28/2023 0202 by Juan Prince RN  Outcome: Progressing     Problem: Chronic Conditions and Co-morbidities  Goal: Patient's chronic conditions and co-morbidity symptoms are monitored and maintained or improved  11/28/2023 1104 by Meng Velasco RN  Outcome: Progressing  11/28/2023 0202 by Juan Prince RN  Outcome: Progressing     Problem: Nutrition Deficit:  Goal: Optimize nutritional status  11/28/2023 1104 by Meng Velasco RN  Outcome: Progressing  11/28/2023 0202 by Juan Prince RN  Outcome: Progressing

## 2023-11-29 ENCOUNTER — ANESTHESIA EVENT (OUTPATIENT)
Dept: OPERATING ROOM | Age: 43
End: 2023-11-29
Payer: MEDICARE

## 2023-11-29 LAB
ANION GAP SERPL CALCULATED.3IONS-SCNC: 8 MMOL/L (ref 9–17)
BUN SERPL-MCNC: 6 MG/DL (ref 6–20)
CALCIUM SERPL-MCNC: 8.2 MG/DL (ref 8.6–10.4)
CHLORIDE SERPL-SCNC: 100 MMOL/L (ref 98–107)
CO2 SERPL-SCNC: 28 MMOL/L (ref 20–31)
CREAT SERPL-MCNC: 0.4 MG/DL (ref 0.5–0.9)
GFR SERPL CREATININE-BSD FRML MDRD: >60 ML/MIN/1.73M2
GLUCOSE BLD-MCNC: 109 MG/DL (ref 65–105)
GLUCOSE BLD-MCNC: 139 MG/DL (ref 65–105)
GLUCOSE BLD-MCNC: 189 MG/DL (ref 65–105)
GLUCOSE BLD-MCNC: 192 MG/DL (ref 65–105)
GLUCOSE SERPL-MCNC: 116 MG/DL (ref 70–99)
HCT VFR BLD AUTO: 24.2 % (ref 36.3–47.1)
HGB BLD-MCNC: 7.3 G/DL (ref 11.9–15.1)
MAGNESIUM SERPL-MCNC: 1.6 MG/DL (ref 1.6–2.6)
POTASSIUM SERPL-SCNC: 4.3 MMOL/L (ref 3.7–5.3)
SODIUM SERPL-SCNC: 136 MMOL/L (ref 135–144)
ZINC SERPL-MCNC: 36.1 UG/DL (ref 60–120)

## 2023-11-29 PROCEDURE — 2580000003 HC RX 258

## 2023-11-29 PROCEDURE — 6370000000 HC RX 637 (ALT 250 FOR IP)

## 2023-11-29 PROCEDURE — 36415 COLL VENOUS BLD VENIPUNCTURE: CPT

## 2023-11-29 PROCEDURE — 99232 SBSQ HOSP IP/OBS MODERATE 35: CPT | Performed by: INTERNAL MEDICINE

## 2023-11-29 PROCEDURE — 80048 BASIC METABOLIC PNL TOTAL CA: CPT

## 2023-11-29 PROCEDURE — 83735 ASSAY OF MAGNESIUM: CPT

## 2023-11-29 PROCEDURE — 85018 HEMOGLOBIN: CPT

## 2023-11-29 PROCEDURE — 6370000000 HC RX 637 (ALT 250 FOR IP): Performed by: STUDENT IN AN ORGANIZED HEALTH CARE EDUCATION/TRAINING PROGRAM

## 2023-11-29 PROCEDURE — 6360000002 HC RX W HCPCS

## 2023-11-29 PROCEDURE — 82947 ASSAY GLUCOSE BLOOD QUANT: CPT

## 2023-11-29 PROCEDURE — 99232 SBSQ HOSP IP/OBS MODERATE 35: CPT | Performed by: HOSPITALIST

## 2023-11-29 PROCEDURE — 85014 HEMATOCRIT: CPT

## 2023-11-29 PROCEDURE — 1200000000 HC SEMI PRIVATE

## 2023-11-29 RX ORDER — SODIUM CHLORIDE 9 MG/ML
INJECTION, SOLUTION INTRAVENOUS PRN
Status: DISCONTINUED | OUTPATIENT
Start: 2023-11-29 | End: 2023-12-10 | Stop reason: HOSPADM

## 2023-11-29 RX ADMIN — BUPROPION HYDROCHLORIDE 150 MG: 150 TABLET, EXTENDED RELEASE ORAL at 10:30

## 2023-11-29 RX ADMIN — DAKIN'S SOLUTION 0.125% (QUARTER STRENGTH): 0.12 SOLUTION at 09:03

## 2023-11-29 RX ADMIN — ACETAMINOPHEN 1000 MG: 500 TABLET ORAL at 12:44

## 2023-11-29 RX ADMIN — OXYCODONE 10 MG: 5 TABLET ORAL at 00:22

## 2023-11-29 RX ADMIN — OXYCODONE 10 MG: 5 TABLET ORAL at 04:32

## 2023-11-29 RX ADMIN — SODIUM CHLORIDE, PRESERVATIVE FREE 10 ML: 5 INJECTION INTRAVENOUS at 10:30

## 2023-11-29 RX ADMIN — Medication 2000 MG: at 23:54

## 2023-11-29 RX ADMIN — OXYCODONE 10 MG: 5 TABLET ORAL at 08:39

## 2023-11-29 RX ADMIN — SODIUM CHLORIDE, PRESERVATIVE FREE 10 ML: 5 INJECTION INTRAVENOUS at 20:04

## 2023-11-29 RX ADMIN — Medication 2000 MG: at 00:21

## 2023-11-29 RX ADMIN — FLUOXETINE HYDROCHLORIDE 40 MG: 20 CAPSULE ORAL at 08:40

## 2023-11-29 RX ADMIN — HYDROMORPHONE HYDROCHLORIDE 0.5 MG: 1 INJECTION, SOLUTION INTRAMUSCULAR; INTRAVENOUS; SUBCUTANEOUS at 10:30

## 2023-11-29 RX ADMIN — HYDROMORPHONE HYDROCHLORIDE 0.5 MG: 1 INJECTION, SOLUTION INTRAMUSCULAR; INTRAVENOUS; SUBCUTANEOUS at 06:22

## 2023-11-29 RX ADMIN — INSULIN GLARGINE 24 UNITS: 100 INJECTION, SOLUTION SUBCUTANEOUS at 08:40

## 2023-11-29 RX ADMIN — OXYCODONE 10 MG: 5 TABLET ORAL at 12:44

## 2023-11-29 RX ADMIN — PRAVASTATIN SODIUM 40 MG: 20 TABLET ORAL at 20:04

## 2023-11-29 RX ADMIN — ACETAMINOPHEN 1000 MG: 500 TABLET ORAL at 18:03

## 2023-11-29 RX ADMIN — OXYCODONE 10 MG: 5 TABLET ORAL at 16:46

## 2023-11-29 RX ADMIN — HYDROMORPHONE HYDROCHLORIDE 0.5 MG: 1 INJECTION, SOLUTION INTRAMUSCULAR; INTRAVENOUS; SUBCUTANEOUS at 18:03

## 2023-11-29 RX ADMIN — HYDROMORPHONE HYDROCHLORIDE 0.5 MG: 1 INJECTION, SOLUTION INTRAMUSCULAR; INTRAVENOUS; SUBCUTANEOUS at 14:09

## 2023-11-29 RX ADMIN — METOPROLOL TARTRATE 25 MG: 25 TABLET, FILM COATED ORAL at 08:40

## 2023-11-29 RX ADMIN — OXYCODONE 10 MG: 5 TABLET ORAL at 20:55

## 2023-11-29 RX ADMIN — RIFAMPIN 600 MG: 300 CAPSULE ORAL at 08:39

## 2023-11-29 RX ADMIN — ACETAMINOPHEN 1000 MG: 500 TABLET ORAL at 23:53

## 2023-11-29 RX ADMIN — LOSARTAN POTASSIUM 50 MG: 50 TABLET ORAL at 08:40

## 2023-11-29 RX ADMIN — LORAZEPAM 0.5 MG: 0.5 TABLET ORAL at 22:12

## 2023-11-29 RX ADMIN — HYDROMORPHONE HYDROCHLORIDE 0.5 MG: 1 INJECTION, SOLUTION INTRAMUSCULAR; INTRAVENOUS; SUBCUTANEOUS at 02:14

## 2023-11-29 RX ADMIN — LORAZEPAM 0.5 MG: 0.5 TABLET ORAL at 00:28

## 2023-11-29 RX ADMIN — ACETAMINOPHEN 1000 MG: 500 TABLET ORAL at 00:22

## 2023-11-29 RX ADMIN — Medication 2000 MG: at 12:45

## 2023-11-29 RX ADMIN — HYDROMORPHONE HYDROCHLORIDE 0.5 MG: 1 INJECTION, SOLUTION INTRAMUSCULAR; INTRAVENOUS; SUBCUTANEOUS at 22:12

## 2023-11-29 ASSESSMENT — PAIN DESCRIPTION - LOCATION
LOCATION: HIP

## 2023-11-29 ASSESSMENT — PAIN SCALES - GENERAL
PAINLEVEL_OUTOF10: 7
PAINLEVEL_OUTOF10: 8
PAINLEVEL_OUTOF10: 10
PAINLEVEL_OUTOF10: 9
PAINLEVEL_OUTOF10: 8
PAINLEVEL_OUTOF10: 7
PAINLEVEL_OUTOF10: 8

## 2023-11-29 ASSESSMENT — PAIN - FUNCTIONAL ASSESSMENT
PAIN_FUNCTIONAL_ASSESSMENT: PREVENTS OR INTERFERES WITH ALL ACTIVE AND SOME PASSIVE ACTIVITIES

## 2023-11-29 ASSESSMENT — PAIN DESCRIPTION - DESCRIPTORS
DESCRIPTORS: BURNING;SHARP

## 2023-11-29 ASSESSMENT — PAIN DESCRIPTION - ORIENTATION
ORIENTATION: LEFT

## 2023-11-30 ENCOUNTER — ANESTHESIA (OUTPATIENT)
Dept: OPERATING ROOM | Age: 43
End: 2023-11-30
Payer: MEDICARE

## 2023-11-30 LAB
ANION GAP SERPL CALCULATED.3IONS-SCNC: 10 MMOL/L (ref 9–17)
BUN SERPL-MCNC: 10 MG/DL (ref 6–20)
CALCIUM SERPL-MCNC: 8.1 MG/DL (ref 8.6–10.4)
CHLORIDE SERPL-SCNC: 103 MMOL/L (ref 98–107)
CO2 SERPL-SCNC: 24 MMOL/L (ref 20–31)
CREAT SERPL-MCNC: 0.4 MG/DL (ref 0.5–0.9)
CRP SERPL HS-MCNC: 95.5 MG/L (ref 0–5)
GFR SERPL CREATININE-BSD FRML MDRD: >60 ML/MIN/1.73M2
GLUCOSE BLD-MCNC: 137 MG/DL (ref 65–105)
GLUCOSE BLD-MCNC: 181 MG/DL (ref 65–105)
GLUCOSE BLD-MCNC: 190 MG/DL (ref 65–105)
GLUCOSE SERPL-MCNC: 160 MG/DL (ref 70–99)
MAGNESIUM SERPL-MCNC: 2.1 MG/DL (ref 1.6–2.6)
POTASSIUM SERPL-SCNC: 4.7 MMOL/L (ref 3.7–5.3)
SODIUM SERPL-SCNC: 137 MMOL/L (ref 135–144)

## 2023-11-30 PROCEDURE — 3700000001 HC ADD 15 MINUTES (ANESTHESIA): Performed by: STUDENT IN AN ORGANIZED HEALTH CARE EDUCATION/TRAINING PROGRAM

## 2023-11-30 PROCEDURE — 6370000000 HC RX 637 (ALT 250 FOR IP)

## 2023-11-30 PROCEDURE — 0QB70ZZ EXCISION OF LEFT UPPER FEMUR, OPEN APPROACH: ICD-10-PCS | Performed by: STUDENT IN AN ORGANIZED HEALTH CARE EDUCATION/TRAINING PROGRAM

## 2023-11-30 PROCEDURE — 11044 DBRDMT BONE 1ST 20 SQ CM/<: CPT | Performed by: STUDENT IN AN ORGANIZED HEALTH CARE EDUCATION/TRAINING PROGRAM

## 2023-11-30 PROCEDURE — 13160 SEC CLSR SURG WND/DEHSN XTN: CPT | Performed by: STUDENT IN AN ORGANIZED HEALTH CARE EDUCATION/TRAINING PROGRAM

## 2023-11-30 PROCEDURE — 83735 ASSAY OF MAGNESIUM: CPT

## 2023-11-30 PROCEDURE — 80048 BASIC METABOLIC PNL TOTAL CA: CPT

## 2023-11-30 PROCEDURE — 6360000002 HC RX W HCPCS

## 2023-11-30 PROCEDURE — C1713 ANCHOR/SCREW BN/BN,TIS/BN: HCPCS | Performed by: STUDENT IN AN ORGANIZED HEALTH CARE EDUCATION/TRAINING PROGRAM

## 2023-11-30 PROCEDURE — 1200000000 HC SEMI PRIVATE

## 2023-11-30 PROCEDURE — 7100000001 HC PACU RECOVERY - ADDTL 15 MIN: Performed by: STUDENT IN AN ORGANIZED HEALTH CARE EDUCATION/TRAINING PROGRAM

## 2023-11-30 PROCEDURE — 86140 C-REACTIVE PROTEIN: CPT

## 2023-11-30 PROCEDURE — 3700000000 HC ANESTHESIA ATTENDED CARE: Performed by: STUDENT IN AN ORGANIZED HEALTH CARE EDUCATION/TRAINING PROGRAM

## 2023-11-30 PROCEDURE — 7100000000 HC PACU RECOVERY - FIRST 15 MIN: Performed by: STUDENT IN AN ORGANIZED HEALTH CARE EDUCATION/TRAINING PROGRAM

## 2023-11-30 PROCEDURE — 97605 NEG PRS WND THER DME<=50SQCM: CPT | Performed by: STUDENT IN AN ORGANIZED HEALTH CARE EDUCATION/TRAINING PROGRAM

## 2023-11-30 PROCEDURE — 3E0V329 INTRODUCTION OF OTHER ANTI-INFECTIVE INTO BONES, PERCUTANEOUS APPROACH: ICD-10-PCS | Performed by: STUDENT IN AN ORGANIZED HEALTH CARE EDUCATION/TRAINING PROGRAM

## 2023-11-30 PROCEDURE — 11047 DBRDMT BONE EACH ADDL: CPT | Performed by: STUDENT IN AN ORGANIZED HEALTH CARE EDUCATION/TRAINING PROGRAM

## 2023-11-30 PROCEDURE — 3600000015 HC SURGERY LEVEL 5 ADDTL 15MIN: Performed by: STUDENT IN AN ORGANIZED HEALTH CARE EDUCATION/TRAINING PROGRAM

## 2023-11-30 PROCEDURE — 82947 ASSAY GLUCOSE BLOOD QUANT: CPT

## 2023-11-30 PROCEDURE — 99232 SBSQ HOSP IP/OBS MODERATE 35: CPT | Performed by: INTERNAL MEDICINE

## 2023-11-30 PROCEDURE — 3600000005 HC SURGERY LEVEL 5 BASE: Performed by: STUDENT IN AN ORGANIZED HEALTH CARE EDUCATION/TRAINING PROGRAM

## 2023-11-30 PROCEDURE — 6360000002 HC RX W HCPCS: Performed by: STUDENT IN AN ORGANIZED HEALTH CARE EDUCATION/TRAINING PROGRAM

## 2023-11-30 PROCEDURE — 2709999900 HC NON-CHARGEABLE SUPPLY: Performed by: STUDENT IN AN ORGANIZED HEALTH CARE EDUCATION/TRAINING PROGRAM

## 2023-11-30 PROCEDURE — 2720000010 HC SURG SUPPLY STERILE: Performed by: STUDENT IN AN ORGANIZED HEALTH CARE EDUCATION/TRAINING PROGRAM

## 2023-11-30 PROCEDURE — 2500000003 HC RX 250 WO HCPCS

## 2023-11-30 PROCEDURE — 6370000000 HC RX 637 (ALT 250 FOR IP): Performed by: STUDENT IN AN ORGANIZED HEALTH CARE EDUCATION/TRAINING PROGRAM

## 2023-11-30 PROCEDURE — 2580000003 HC RX 258: Performed by: STUDENT IN AN ORGANIZED HEALTH CARE EDUCATION/TRAINING PROGRAM

## 2023-11-30 PROCEDURE — 6360000002 HC RX W HCPCS: Performed by: ANESTHESIOLOGY

## 2023-11-30 PROCEDURE — 6360000002 HC RX W HCPCS: Performed by: CHIROPRACTOR

## 2023-11-30 PROCEDURE — 2580000003 HC RX 258

## 2023-11-30 PROCEDURE — 99232 SBSQ HOSP IP/OBS MODERATE 35: CPT | Performed by: HOSPITALIST

## 2023-11-30 PROCEDURE — 36415 COLL VENOUS BLD VENIPUNCTURE: CPT

## 2023-11-30 PROCEDURE — 20700 MNL PREP&INSJ DP RX DLVR DEV: CPT | Performed by: STUDENT IN AN ORGANIZED HEALTH CARE EDUCATION/TRAINING PROGRAM

## 2023-11-30 DEVICE — STIMULAN® RAPID CURE PROVIDED STERILE FOR SINGLE PATIENT USE. STIMULAN® RAPID CURE CONTAINS CALCIUM SULFATE POWDER AND MIXING SOLUTION IN PRE-MEASURED QUANTITIES SO THAT WHEN MIXED TOGETHER IN A STERILE MIXING BOWL, THE RESULTANT PASTE IS TO BE DIGITALLY PACKED INTO OPEN BONE VOID/GAP TO SET INSITU OR PLACED INTO THE MOULD PROVIDED, THE MIXTURE SETS TO FORM BEADS. THE BIODEGRADABLE, RADIOPAQUE BEADS ARE RESORBED IN APPROXIMATELY 30 – 60 DAYS WHEN USED IN ACCORDANCE WITH THE DEVICE LABELLING. STIMULAN® RAPID CURE IS MANUFACTURED FROM SYNTHETIC IMPLANT GRADE CALCIUM SULFATE DIHYDRATE(CASO4.2H2O) THAT RESORBS AND IS REPLACED WITH BONE DURING THE HEALING PROCESS. ALSO, AS THE BONE VOID FILLER BEADS ARE BIODEGRADABLE AND BIOCOMPATIBLE, THEY MAY BE USED AT AN INFECTED SITE.
Type: IMPLANTABLE DEVICE | Site: FEMUR | Status: FUNCTIONAL
Brand: STIMULAN® RAPID CURE

## 2023-11-30 RX ORDER — DEXAMETHASONE SODIUM PHOSPHATE 10 MG/ML
INJECTION INTRAMUSCULAR; INTRAVENOUS PRN
Status: DISCONTINUED | OUTPATIENT
Start: 2023-11-30 | End: 2023-11-30 | Stop reason: SDUPTHER

## 2023-11-30 RX ORDER — PROPOFOL 10 MG/ML
INJECTION, EMULSION INTRAVENOUS PRN
Status: DISCONTINUED | OUTPATIENT
Start: 2023-11-30 | End: 2023-11-30 | Stop reason: SDUPTHER

## 2023-11-30 RX ORDER — ROCURONIUM BROMIDE 10 MG/ML
INJECTION, SOLUTION INTRAVENOUS PRN
Status: DISCONTINUED | OUTPATIENT
Start: 2023-11-30 | End: 2023-11-30 | Stop reason: SDUPTHER

## 2023-11-30 RX ORDER — MAGNESIUM HYDROXIDE 1200 MG/15ML
LIQUID ORAL CONTINUOUS PRN
Status: DISCONTINUED | OUTPATIENT
Start: 2023-11-30 | End: 2023-11-30 | Stop reason: HOSPADM

## 2023-11-30 RX ORDER — MIDAZOLAM HYDROCHLORIDE 2 MG/2ML
2 INJECTION, SOLUTION INTRAMUSCULAR; INTRAVENOUS ONCE
Status: COMPLETED | OUTPATIENT
Start: 2023-11-30 | End: 2023-11-30

## 2023-11-30 RX ORDER — SODIUM CHLORIDE 0.9 % (FLUSH) 0.9 %
5-40 SYRINGE (ML) INJECTION EVERY 12 HOURS SCHEDULED
Status: DISCONTINUED | OUTPATIENT
Start: 2023-11-30 | End: 2023-11-30 | Stop reason: HOSPADM

## 2023-11-30 RX ORDER — DEXMEDETOMIDINE HYDROCHLORIDE 100 UG/ML
INJECTION, SOLUTION INTRAVENOUS PRN
Status: DISCONTINUED | OUTPATIENT
Start: 2023-11-30 | End: 2023-11-30 | Stop reason: SDUPTHER

## 2023-11-30 RX ORDER — PHENYLEPHRINE HCL IN 0.9% NACL 1 MG/10 ML
SYRINGE (ML) INTRAVENOUS PRN
Status: DISCONTINUED | OUTPATIENT
Start: 2023-11-30 | End: 2023-11-30 | Stop reason: SDUPTHER

## 2023-11-30 RX ORDER — FENTANYL CITRATE 50 UG/ML
INJECTION, SOLUTION INTRAMUSCULAR; INTRAVENOUS PRN
Status: DISCONTINUED | OUTPATIENT
Start: 2023-11-30 | End: 2023-11-30 | Stop reason: SDUPTHER

## 2023-11-30 RX ORDER — METOCLOPRAMIDE HYDROCHLORIDE 5 MG/ML
10 INJECTION INTRAMUSCULAR; INTRAVENOUS
Status: DISCONTINUED | OUTPATIENT
Start: 2023-11-30 | End: 2023-11-30 | Stop reason: HOSPADM

## 2023-11-30 RX ORDER — LABETALOL HYDROCHLORIDE 5 MG/ML
10 INJECTION, SOLUTION INTRAVENOUS
Status: DISCONTINUED | OUTPATIENT
Start: 2023-11-30 | End: 2023-11-30 | Stop reason: HOSPADM

## 2023-11-30 RX ORDER — FENTANYL CITRATE 50 UG/ML
25 INJECTION, SOLUTION INTRAMUSCULAR; INTRAVENOUS EVERY 5 MIN PRN
Status: DISCONTINUED | OUTPATIENT
Start: 2023-11-30 | End: 2023-11-30 | Stop reason: HOSPADM

## 2023-11-30 RX ORDER — LIDOCAINE HYDROCHLORIDE 10 MG/ML
INJECTION, SOLUTION EPIDURAL; INFILTRATION; INTRACAUDAL; PERINEURAL PRN
Status: DISCONTINUED | OUTPATIENT
Start: 2023-11-30 | End: 2023-11-30 | Stop reason: SDUPTHER

## 2023-11-30 RX ORDER — GENTAMICIN SULFATE 40 MG/ML
INJECTION, SOLUTION INTRAMUSCULAR; INTRAVENOUS PRN
Status: DISCONTINUED | OUTPATIENT
Start: 2023-11-30 | End: 2023-11-30 | Stop reason: HOSPADM

## 2023-11-30 RX ORDER — VANCOMYCIN HYDROCHLORIDE 1 G/20ML
INJECTION, POWDER, LYOPHILIZED, FOR SOLUTION INTRAVENOUS PRN
Status: DISCONTINUED | OUTPATIENT
Start: 2023-11-30 | End: 2023-11-30 | Stop reason: HOSPADM

## 2023-11-30 RX ORDER — PROCHLORPERAZINE EDISYLATE 5 MG/ML
5 INJECTION INTRAMUSCULAR; INTRAVENOUS
Status: DISCONTINUED | OUTPATIENT
Start: 2023-11-30 | End: 2023-11-30 | Stop reason: HOSPADM

## 2023-11-30 RX ORDER — MIDAZOLAM HYDROCHLORIDE 1 MG/ML
INJECTION INTRAMUSCULAR; INTRAVENOUS PRN
Status: DISCONTINUED | OUTPATIENT
Start: 2023-11-30 | End: 2023-11-30 | Stop reason: SDUPTHER

## 2023-11-30 RX ORDER — ONDANSETRON 2 MG/ML
INJECTION INTRAMUSCULAR; INTRAVENOUS PRN
Status: DISCONTINUED | OUTPATIENT
Start: 2023-11-30 | End: 2023-11-30 | Stop reason: SDUPTHER

## 2023-11-30 RX ORDER — KETAMINE HCL IN NACL, ISO-OSM 100MG/10ML
SYRINGE (ML) INJECTION PRN
Status: DISCONTINUED | OUTPATIENT
Start: 2023-11-30 | End: 2023-11-30 | Stop reason: SDUPTHER

## 2023-11-30 RX ORDER — SODIUM CHLORIDE, SODIUM LACTATE, POTASSIUM CHLORIDE, CALCIUM CHLORIDE 600; 310; 30; 20 MG/100ML; MG/100ML; MG/100ML; MG/100ML
INJECTION, SOLUTION INTRAVENOUS CONTINUOUS PRN
Status: DISCONTINUED | OUTPATIENT
Start: 2023-11-30 | End: 2023-11-30 | Stop reason: SDUPTHER

## 2023-11-30 RX ORDER — MIDAZOLAM HYDROCHLORIDE 1 MG/ML
INJECTION INTRAMUSCULAR; INTRAVENOUS
Status: COMPLETED
Start: 2023-11-30 | End: 2023-11-30

## 2023-11-30 RX ORDER — SODIUM CHLORIDE, SODIUM LACTATE, POTASSIUM CHLORIDE, CALCIUM CHLORIDE 600; 310; 30; 20 MG/100ML; MG/100ML; MG/100ML; MG/100ML
INJECTION, SOLUTION INTRAVENOUS CONTINUOUS
Status: DISCONTINUED | OUTPATIENT
Start: 2023-11-30 | End: 2023-11-30

## 2023-11-30 RX ADMIN — FENTANYL CITRATE 25 MCG: 50 INJECTION, SOLUTION INTRAMUSCULAR; INTRAVENOUS at 13:10

## 2023-11-30 RX ADMIN — ROCURONIUM BROMIDE 30 MG: 10 INJECTION, SOLUTION INTRAVENOUS at 11:06

## 2023-11-30 RX ADMIN — Medication 100 MCG: at 11:30

## 2023-11-30 RX ADMIN — OXYCODONE 10 MG: 5 TABLET ORAL at 03:42

## 2023-11-30 RX ADMIN — FENTANYL CITRATE 25 MCG: 50 INJECTION, SOLUTION INTRAMUSCULAR; INTRAVENOUS at 13:36

## 2023-11-30 RX ADMIN — OXYCODONE 10 MG: 5 TABLET ORAL at 21:21

## 2023-11-30 RX ADMIN — HYDROMORPHONE HYDROCHLORIDE 0.5 MG: 1 INJECTION, SOLUTION INTRAMUSCULAR; INTRAVENOUS; SUBCUTANEOUS at 10:13

## 2023-11-30 RX ADMIN — HYDROMORPHONE HYDROCHLORIDE 0.5 MG: 1 INJECTION, SOLUTION INTRAMUSCULAR; INTRAVENOUS; SUBCUTANEOUS at 14:57

## 2023-11-30 RX ADMIN — PROPOFOL 200 MG: 10 INJECTION, EMULSION INTRAVENOUS at 10:37

## 2023-11-30 RX ADMIN — HYDROMORPHONE HYDROCHLORIDE 0.5 MG: 1 INJECTION, SOLUTION INTRAMUSCULAR; INTRAVENOUS; SUBCUTANEOUS at 12:29

## 2023-11-30 RX ADMIN — FENTANYL CITRATE 50 MCG: 0.05 INJECTION, SOLUTION INTRAMUSCULAR; INTRAVENOUS at 11:57

## 2023-11-30 RX ADMIN — BUPROPION HYDROCHLORIDE 150 MG: 150 TABLET, EXTENDED RELEASE ORAL at 08:09

## 2023-11-30 RX ADMIN — Medication 2000 MG: at 12:07

## 2023-11-30 RX ADMIN — HYDROMORPHONE HYDROCHLORIDE 0.5 MG: 1 INJECTION, SOLUTION INTRAMUSCULAR; INTRAVENOUS; SUBCUTANEOUS at 19:07

## 2023-11-30 RX ADMIN — DEXAMETHASONE SODIUM PHOSPHATE 5 MG: 10 INJECTION INTRAMUSCULAR; INTRAVENOUS at 11:03

## 2023-11-30 RX ADMIN — FENTANYL CITRATE 25 MCG: 50 INJECTION, SOLUTION INTRAMUSCULAR; INTRAVENOUS at 13:25

## 2023-11-30 RX ADMIN — MIDAZOLAM HYDROCHLORIDE 2 MG: 2 INJECTION, SOLUTION INTRAMUSCULAR; INTRAVENOUS at 10:21

## 2023-11-30 RX ADMIN — HYDROMORPHONE HYDROCHLORIDE 0.5 MG: 1 INJECTION, SOLUTION INTRAMUSCULAR; INTRAVENOUS; SUBCUTANEOUS at 13:10

## 2023-11-30 RX ADMIN — Medication 100 MCG: at 11:47

## 2023-11-30 RX ADMIN — INSULIN GLARGINE 24 UNITS: 100 INJECTION, SOLUTION SUBCUTANEOUS at 08:05

## 2023-11-30 RX ADMIN — SODIUM CHLORIDE, POTASSIUM CHLORIDE, SODIUM LACTATE AND CALCIUM CHLORIDE: 600; 310; 30; 20 INJECTION, SOLUTION INTRAVENOUS at 12:00

## 2023-11-30 RX ADMIN — ACETAMINOPHEN 1000 MG: 500 TABLET ORAL at 06:02

## 2023-11-30 RX ADMIN — PRAVASTATIN SODIUM 40 MG: 20 TABLET ORAL at 20:03

## 2023-11-30 RX ADMIN — FENTANYL CITRATE 100 MCG: 0.05 INJECTION, SOLUTION INTRAMUSCULAR; INTRAVENOUS at 12:14

## 2023-11-30 RX ADMIN — FENTANYL CITRATE 100 MCG: 0.05 INJECTION, SOLUTION INTRAMUSCULAR; INTRAVENOUS at 10:37

## 2023-11-30 RX ADMIN — HYDROMORPHONE HYDROCHLORIDE 0.5 MG: 1 INJECTION, SOLUTION INTRAMUSCULAR; INTRAVENOUS; SUBCUTANEOUS at 13:20

## 2023-11-30 RX ADMIN — DEXMEDETOMIDINE HYDROCHLORIDE 10 MCG: 100 INJECTION, SOLUTION INTRAVENOUS at 12:29

## 2023-11-30 RX ADMIN — ROCURONIUM BROMIDE 50 MG: 10 INJECTION, SOLUTION INTRAVENOUS at 10:37

## 2023-11-30 RX ADMIN — FENTANYL CITRATE 100 MCG: 0.05 INJECTION, SOLUTION INTRAMUSCULAR; INTRAVENOUS at 11:06

## 2023-11-30 RX ADMIN — Medication 200 MCG: at 10:44

## 2023-11-30 RX ADMIN — HYDROMORPHONE HYDROCHLORIDE 0.5 MG: 1 INJECTION, SOLUTION INTRAMUSCULAR; INTRAVENOUS; SUBCUTANEOUS at 12:24

## 2023-11-30 RX ADMIN — ACETAMINOPHEN 1000 MG: 500 TABLET ORAL at 16:09

## 2023-11-30 RX ADMIN — OXYCODONE 10 MG: 5 TABLET ORAL at 17:37

## 2023-11-30 RX ADMIN — OXYCODONE 10 MG: 5 TABLET ORAL at 13:39

## 2023-11-30 RX ADMIN — INSULIN GLARGINE 10 UNITS: 100 INJECTION, SOLUTION SUBCUTANEOUS at 20:03

## 2023-11-30 RX ADMIN — Medication 2000 MG: at 22:56

## 2023-11-30 RX ADMIN — Medication 30 MG: at 10:37

## 2023-11-30 RX ADMIN — HYDROMORPHONE HYDROCHLORIDE 0.5 MG: 1 INJECTION, SOLUTION INTRAMUSCULAR; INTRAVENOUS; SUBCUTANEOUS at 02:22

## 2023-11-30 RX ADMIN — OXYCODONE 10 MG: 5 TABLET ORAL at 08:10

## 2023-11-30 RX ADMIN — ACETAMINOPHEN 1000 MG: 500 TABLET ORAL at 22:58

## 2023-11-30 RX ADMIN — RIFAMPIN 600 MG: 300 CAPSULE ORAL at 08:09

## 2023-11-30 RX ADMIN — DEXMEDETOMIDINE HYDROCHLORIDE 10 MCG: 100 INJECTION, SOLUTION INTRAVENOUS at 12:32

## 2023-11-30 RX ADMIN — HYDROMORPHONE HYDROCHLORIDE 0.5 MG: 1 INJECTION, SOLUTION INTRAMUSCULAR; INTRAVENOUS; SUBCUTANEOUS at 22:52

## 2023-11-30 RX ADMIN — HYDROMORPHONE HYDROCHLORIDE 0.5 MG: 1 INJECTION, SOLUTION INTRAMUSCULAR; INTRAVENOUS; SUBCUTANEOUS at 13:37

## 2023-11-30 RX ADMIN — METOPROLOL TARTRATE 25 MG: 25 TABLET, FILM COATED ORAL at 08:10

## 2023-11-30 RX ADMIN — LORAZEPAM 0.5 MG: 0.5 TABLET ORAL at 16:09

## 2023-11-30 RX ADMIN — FLUOXETINE HYDROCHLORIDE 40 MG: 20 CAPSULE ORAL at 08:09

## 2023-11-30 RX ADMIN — MIDAZOLAM HYDROCHLORIDE 2 MG: 1 INJECTION, SOLUTION INTRAMUSCULAR; INTRAVENOUS at 10:21

## 2023-11-30 RX ADMIN — FENTANYL CITRATE 25 MCG: 50 INJECTION, SOLUTION INTRAMUSCULAR; INTRAVENOUS at 13:16

## 2023-11-30 RX ADMIN — HYDROMORPHONE HYDROCHLORIDE 0.5 MG: 1 INJECTION, SOLUTION INTRAMUSCULAR; INTRAVENOUS; SUBCUTANEOUS at 06:23

## 2023-11-30 RX ADMIN — FENTANYL CITRATE 25 MCG: 50 INJECTION, SOLUTION INTRAMUSCULAR; INTRAVENOUS at 13:23

## 2023-11-30 RX ADMIN — Medication 20 MG: at 11:41

## 2023-11-30 RX ADMIN — LOSARTAN POTASSIUM 50 MG: 50 TABLET ORAL at 08:10

## 2023-11-30 RX ADMIN — MIDAZOLAM 2 MG: 1 INJECTION INTRAMUSCULAR; INTRAVENOUS at 10:32

## 2023-11-30 RX ADMIN — SODIUM CHLORIDE, PRESERVATIVE FREE 10 ML: 5 INJECTION INTRAVENOUS at 08:08

## 2023-11-30 RX ADMIN — ONDANSETRON 4 MG: 2 INJECTION INTRAMUSCULAR; INTRAVENOUS at 12:14

## 2023-11-30 RX ADMIN — LIDOCAINE HYDROCHLORIDE 50 MG: 10 INJECTION, SOLUTION EPIDURAL; INFILTRATION; INTRACAUDAL; PERINEURAL at 10:37

## 2023-11-30 RX ADMIN — Medication 2000 MG: at 11:01

## 2023-11-30 RX ADMIN — SODIUM CHLORIDE, POTASSIUM CHLORIDE, SODIUM LACTATE AND CALCIUM CHLORIDE: 600; 310; 30; 20 INJECTION, SOLUTION INTRAVENOUS at 10:32

## 2023-11-30 RX ADMIN — SODIUM CHLORIDE, PRESERVATIVE FREE 10 ML: 5 INJECTION INTRAVENOUS at 20:04

## 2023-11-30 ASSESSMENT — PAIN SCALES - GENERAL
PAINLEVEL_OUTOF10: 7
PAINLEVEL_OUTOF10: 8
PAINLEVEL_OUTOF10: 7
PAINLEVEL_OUTOF10: 8
PAINLEVEL_OUTOF10: 7
PAINLEVEL_OUTOF10: 10
PAINLEVEL_OUTOF10: 8
PAINLEVEL_OUTOF10: 8
PAINLEVEL_OUTOF10: 10
PAINLEVEL_OUTOF10: 8
PAINLEVEL_OUTOF10: 7
PAINLEVEL_OUTOF10: 7
PAINLEVEL_OUTOF10: 10

## 2023-11-30 ASSESSMENT — PAIN DESCRIPTION - FREQUENCY
FREQUENCY: CONTINUOUS
FREQUENCY: CONTINUOUS

## 2023-11-30 ASSESSMENT — PAIN DESCRIPTION - DESCRIPTORS
DESCRIPTORS: BURNING;SHARP
DESCRIPTORS: THROBBING
DESCRIPTORS: THROBBING
DESCRIPTORS: ACHING;DISCOMFORT;THROBBING
DESCRIPTORS: BURNING;DISCOMFORT
DESCRIPTORS: BURNING;SHARP
DESCRIPTORS: BURNING;SHARP

## 2023-11-30 ASSESSMENT — PAIN DESCRIPTION - ORIENTATION
ORIENTATION: LEFT

## 2023-11-30 ASSESSMENT — PAIN DESCRIPTION - ONSET
ONSET: ON-GOING
ONSET: ON-GOING

## 2023-11-30 ASSESSMENT — PAIN DESCRIPTION - LOCATION
LOCATION: HIP
LOCATION: GENERALIZED;LEG;HIP

## 2023-11-30 ASSESSMENT — PAIN - FUNCTIONAL ASSESSMENT
PAIN_FUNCTIONAL_ASSESSMENT: PREVENTS OR INTERFERES WITH ALL ACTIVE AND SOME PASSIVE ACTIVITIES
PAIN_FUNCTIONAL_ASSESSMENT: PREVENTS OR INTERFERES WITH MANY ACTIVE NOT PASSIVE ACTIVITIES
PAIN_FUNCTIONAL_ASSESSMENT: PREVENTS OR INTERFERES WITH ALL ACTIVE AND SOME PASSIVE ACTIVITIES
PAIN_FUNCTIONAL_ASSESSMENT: PREVENTS OR INTERFERES WITH ALL ACTIVE AND SOME PASSIVE ACTIVITIES

## 2023-11-30 ASSESSMENT — PAIN DESCRIPTION - PAIN TYPE
TYPE: SURGICAL PAIN
TYPE: SURGICAL PAIN

## 2023-11-30 NOTE — CONSENT
Informed Consent for Blood Component Transfusion Note    I have discussed with the patient the rationale for blood component transfusion; its benefits in treating or preventing fatigue, organ damage, or death; and its risk which includes mild transfusion reactions, rare risk of blood borne infection, or more serious but rare reactions. I have discussed the alternatives to transfusion, including the risk and consequences of not receiving transfusion. The patient had an opportunity to ask questions and had agreed to proceed with transfusion of blood components.     Electronically signed by Susan Vela MD on 11/30/23 at 11:48 AM EST

## 2023-11-30 NOTE — PLAN OF CARE
Problem: Discharge Planning  Goal: Discharge to home or other facility with appropriate resources  11/30/2023 0151 by Drake Bravo RN  Outcome: Progressing  11/29/2023 1834 by Miguelito Catherine RN  Outcome: Progressing     Problem: Pain  Goal: Verbalizes/displays adequate comfort level or baseline comfort level  11/30/2023 0151 by Drake Bravo RN  Outcome: Progressing  11/29/2023 1834 by Miguelito Catherine RN  Outcome: Progressing     Problem: Safety - Adult  Goal: Free from fall injury  11/30/2023 0151 by Drake Bravo RN  Outcome: Progressing  11/29/2023 1834 by Miguelito Catherine RN  Outcome: Progressing     Problem: ABCDS Injury Assessment  Goal: Absence of physical injury  11/30/2023 0151 by Drake Bravo RN  Outcome: Progressing  11/29/2023 1834 by Miguelito Catherine RN  Outcome: Progressing     Problem: Skin/Tissue Integrity  Goal: Absence of new skin breakdown  Description: 1. Monitor for areas of redness and/or skin breakdown  2. Assess vascular access sites hourly  3. Every 4-6 hours minimum:  Change oxygen saturation probe site  4. Every 4-6 hours:  If on nasal continuous positive airway pressure, respiratory therapy assess nares and determine need for appliance change or resting period.   11/30/2023 0151 by Drake Bravo RN  Outcome: Progressing  11/29/2023 1834 by Miguelito Catherine RN  Outcome: Progressing     Problem: Chronic Conditions and Co-morbidities  Goal: Patient's chronic conditions and co-morbidity symptoms are monitored and maintained or improved  11/30/2023 0151 by Drake Bravo RN  Outcome: Progressing  11/29/2023 1834 by Miguelito Catherine RN  Outcome: Progressing

## 2023-11-30 NOTE — ANESTHESIA POSTPROCEDURE EVALUATION
Department of Anesthesiology  Postprocedure Note    Patient: Lisa Alvraado  MRN: [id number]  [address] [facility] 1980  Date of evaluation: 11/30/2023      Procedure Summary     Date: 11/30/23 Room / Location: [facility] OR [address] [place] [address] [address]    Anesthesia Start: 5896 Anesthesia Stop: 3630    Procedure: IRRIGATION AND DEBRIDEMENT HIP (Evalee Rubio) (Left: Thigh) Diagnosis:       Surgical site infection      (Surgical site infection [T81.49XA])    Surgeons: Bryce Leslie DO Responsible Provider: Amandeep Garcia MD    Anesthesia Type: general ASA Status: 3          Anesthesia Type: No value filed.     Aston Phase I: Aston Score: 8    Aston Phase II:        Anesthesia Post Evaluation    Patient location during evaluation: PACU  Patient participation: complete - patient participated  Level of consciousness: awake and alert  Airway patency: patent  Nausea & Vomiting: no nausea and no vomiting  Complications: no  Cardiovascular status: blood pressure returned to baseline  Respiratory status: acceptable  Hydration status: euvolemic  Comments: No known anesthesia related complication  Multimodal analgesia pain management approach  Pain management: adequate

## 2023-11-30 NOTE — CARE COORDINATION
Discussed transtional planning with review of needs with pt.  To return to [facility] as Pt's freedom of choice  Left vm for a call back to Luz at [address] [facility] to call unit for update on transitional plan

## 2023-11-30 NOTE — BRIEF OP NOTE
Brief Postoperative Note      Patient: Adrienne Jones  YOB: 1980  MRN: [id number]    Date of Procedure: 11/30/2023    Pre-Op Diagnosis Codes:     * Surgical site infection [T81.49XA]    Post-Op Diagnosis: Same       Procedure(s):  Irrigation and excisional debridement of skin down to and including the bone of 40 x 15 cm wound to left proximal thigh  Secondary wound closure of wound 40 x 15 cm  Application of antibiotic eluding device  Application of negative pressure wound VAC    Surgeon(s):  Michelle Rivas DO    Assistant:  Resident: Agnes Patel DO    Anesthesia: General    Estimated Blood Loss (mL): 50 cc's    Fluids: 3,686 mL's    Complications: None    Specimens:   * No specimens in log *    Implants:  Implant Name Type Inv. Item Serial No.  Lot No. LRB No. Used Action   GRAFT BNE SUB 10CC BEAD 25CC CA SULPHATE RAP SET W/ INDIV - [id number]  GRAFT BNE SUB 10CC BEAD 25CC CA SULPHATE RAP SET W/ INDIV  [employer]- [id number] Left 1 Implanted   CELLERATE    [employer]  Left 1 Implanted         Drains:   [REMOVED] Closed/Suction Drain Left;Proximal;Anterior Thigh Accordion (Removed)   Site Description Clean, dry & intact 11/09/23 2045   Dressing Status Clean, dry & intact 11/09/23 2045   Drainage Appearance Bright red;Bloody 11/10/23 0930   Drain Status Compressed 11/10/23 0930   Output (ml) 30 ml 11/10/23 0450       [REMOVED] Closed/Suction Drain Left; Anterior;Distal Thigh Accordion (Removed)   Site Description Unable to view 11/14/23 0200   Dressing Status Clean, dry & intact 11/14/23 0200   Drainage Appearance Bloody 11/14/23 0200   Drain Status Compressed 11/14/23 0200   Output (ml) 5 ml 11/14/23 0505       [REMOVED] Negative Pressure Wound Therapy Leg Left;Upper;Dorsal (Removed)       [REMOVED] Negative Pressure Wound Therapy Leg Left;Proximal;Upper (Removed)   Wound Type Surgical 11/26/23 1425   Dressing Type Other (Comment) 11/26/23 1425   Cycle

## 2023-12-01 LAB
ABO/RH: NORMAL
ALBUMIN SERPL-MCNC: 1.7 G/DL (ref 3.5–5.2)
ALBUMIN/GLOB SERPL: 0.5 {RATIO} (ref 1–2.5)
ALP SERPL-CCNC: 116 U/L (ref 35–104)
ALT SERPL-CCNC: 6 U/L (ref 5–33)
ANION GAP SERPL CALCULATED.3IONS-SCNC: 10 MMOL/L (ref 9–17)
ANTIBODY SCREEN: NEGATIVE
ARM BAND NUMBER: NORMAL
AST SERPL-CCNC: 6 U/L
BILIRUB SERPL-MCNC: <0.1 MG/DL (ref 0.3–1.2)
BLOOD BANK BLOOD PRODUCT EXPIRATION DATE: NORMAL
BLOOD BANK DISPENSE STATUS: NORMAL
BLOOD BANK ISBT PRODUCT BLOOD TYPE: 600
BLOOD BANK PRODUCT CODE: NORMAL
BLOOD BANK SAMPLE EXPIRATION: NORMAL
BLOOD BANK UNIT TYPE AND RH: NORMAL
BPU ID: NORMAL
BUN SERPL-MCNC: 8 MG/DL (ref 6–20)
CALCIUM SERPL-MCNC: 8.2 MG/DL (ref 8.6–10.4)
CHLORIDE SERPL-SCNC: 97 MMOL/L (ref 98–107)
CO2 SERPL-SCNC: 25 MMOL/L (ref 20–31)
COMPONENT: NORMAL
CREAT SERPL-MCNC: 0.4 MG/DL (ref 0.5–0.9)
CROSSMATCH RESULT: NORMAL
ERYTHROCYTE [DISTWIDTH] IN BLOOD BY AUTOMATED COUNT: 17.2 % (ref 11.8–14.4)
GFR SERPL CREATININE-BSD FRML MDRD: >60 ML/MIN/1.73M2
GLUCOSE BLD-MCNC: 120 MG/DL (ref 65–105)
GLUCOSE BLD-MCNC: 127 MG/DL (ref 65–105)
GLUCOSE BLD-MCNC: 160 MG/DL (ref 65–105)
GLUCOSE BLD-MCNC: 70 MG/DL (ref 65–105)
GLUCOSE SERPL-MCNC: 240 MG/DL (ref 70–99)
HCT VFR BLD AUTO: 24.8 % (ref 36.3–47.1)
HGB BLD-MCNC: 7.3 G/DL (ref 11.9–15.1)
MCH RBC QN AUTO: 28.7 PG (ref 25.2–33.5)
MCHC RBC AUTO-ENTMCNC: 29.4 G/DL (ref 28.4–34.8)
MCV RBC AUTO: 97.6 FL (ref 82.6–102.9)
MICROORGANISM SPEC CULT: NORMAL
NRBC BLD-RTO: 0 PER 100 WBC
PLATELET # BLD AUTO: 303 K/UL (ref 138–453)
PMV BLD AUTO: 11.4 FL (ref 8.1–13.5)
POTASSIUM SERPL-SCNC: 4.5 MMOL/L (ref 3.7–5.3)
PROT SERPL-MCNC: 4.8 G/DL (ref 6.4–8.3)
RBC # BLD AUTO: 2.54 M/UL (ref 3.95–5.11)
SERVICE CMNT-IMP: NORMAL
SODIUM SERPL-SCNC: 132 MMOL/L (ref 135–144)
SPECIMEN DESCRIPTION: NORMAL
TRANSFUSION STATUS: NORMAL
UNIT DIVISION: 0
UNIT ISSUE DATE/TIME: NORMAL
WBC OTHER # BLD: 9.8 K/UL (ref 3.5–11.3)

## 2023-12-01 PROCEDURE — 6370000000 HC RX 637 (ALT 250 FOR IP)

## 2023-12-01 PROCEDURE — 85027 COMPLETE CBC AUTOMATED: CPT

## 2023-12-01 PROCEDURE — 2580000003 HC RX 258

## 2023-12-01 PROCEDURE — 1200000000 HC SEMI PRIVATE

## 2023-12-01 PROCEDURE — 6360000002 HC RX W HCPCS: Performed by: HOSPITALIST

## 2023-12-01 PROCEDURE — 6360000002 HC RX W HCPCS: Performed by: INTERNAL MEDICINE

## 2023-12-01 PROCEDURE — 80053 COMPREHEN METABOLIC PANEL: CPT

## 2023-12-01 PROCEDURE — 36415 COLL VENOUS BLD VENIPUNCTURE: CPT

## 2023-12-01 PROCEDURE — 99232 SBSQ HOSP IP/OBS MODERATE 35: CPT | Performed by: HOSPITALIST

## 2023-12-01 PROCEDURE — 82947 ASSAY GLUCOSE BLOOD QUANT: CPT

## 2023-12-01 PROCEDURE — 6370000000 HC RX 637 (ALT 250 FOR IP): Performed by: NURSE PRACTITIONER

## 2023-12-01 PROCEDURE — 99232 SBSQ HOSP IP/OBS MODERATE 35: CPT | Performed by: INTERNAL MEDICINE

## 2023-12-01 RX ORDER — TIZANIDINE 4 MG/1
4 TABLET ORAL ONCE
Status: COMPLETED | OUTPATIENT
Start: 2023-12-01 | End: 2023-12-01

## 2023-12-01 RX ADMIN — INSULIN GLARGINE 10 UNITS: 100 INJECTION, SOLUTION SUBCUTANEOUS at 20:02

## 2023-12-01 RX ADMIN — HYDROMORPHONE HYDROCHLORIDE 0.5 MG: 1 INJECTION, SOLUTION INTRAMUSCULAR; INTRAVENOUS; SUBCUTANEOUS at 18:27

## 2023-12-01 RX ADMIN — METOPROLOL TARTRATE 25 MG: 25 TABLET, FILM COATED ORAL at 07:53

## 2023-12-01 RX ADMIN — ACETAMINOPHEN 1000 MG: 500 TABLET ORAL at 11:43

## 2023-12-01 RX ADMIN — LOSARTAN POTASSIUM 50 MG: 50 TABLET ORAL at 07:53

## 2023-12-01 RX ADMIN — HYDROMORPHONE HYDROCHLORIDE 0.5 MG: 1 INJECTION, SOLUTION INTRAMUSCULAR; INTRAVENOUS; SUBCUTANEOUS at 07:53

## 2023-12-01 RX ADMIN — SODIUM CHLORIDE, PRESERVATIVE FREE 10 ML: 5 INJECTION INTRAVENOUS at 20:09

## 2023-12-01 RX ADMIN — HYDROMORPHONE HYDROCHLORIDE 0.5 MG: 1 INJECTION, SOLUTION INTRAMUSCULAR; INTRAVENOUS; SUBCUTANEOUS at 12:10

## 2023-12-01 RX ADMIN — RIFAMPIN 600 MG: 300 CAPSULE ORAL at 08:20

## 2023-12-01 RX ADMIN — LORAZEPAM 0.5 MG: 0.5 TABLET ORAL at 11:43

## 2023-12-01 RX ADMIN — HYDROMORPHONE HYDROCHLORIDE 0.5 MG: 1 INJECTION, SOLUTION INTRAMUSCULAR; INTRAVENOUS; SUBCUTANEOUS at 14:07

## 2023-12-01 RX ADMIN — LORAZEPAM 0.5 MG: 0.5 TABLET ORAL at 01:00

## 2023-12-01 RX ADMIN — HYDROMORPHONE HYDROCHLORIDE 0.5 MG: 1 INJECTION, SOLUTION INTRAMUSCULAR; INTRAVENOUS; SUBCUTANEOUS at 01:00

## 2023-12-01 RX ADMIN — OXYCODONE 10 MG: 5 TABLET ORAL at 15:51

## 2023-12-01 RX ADMIN — ACETAMINOPHEN 1000 MG: 500 TABLET ORAL at 05:23

## 2023-12-01 RX ADMIN — HYDROMORPHONE HYDROCHLORIDE 0.5 MG: 1 INJECTION, SOLUTION INTRAMUSCULAR; INTRAVENOUS; SUBCUTANEOUS at 21:23

## 2023-12-01 RX ADMIN — HYDROMORPHONE HYDROCHLORIDE 0.5 MG: 1 INJECTION, SOLUTION INTRAMUSCULAR; INTRAVENOUS; SUBCUTANEOUS at 10:06

## 2023-12-01 RX ADMIN — PRAVASTATIN SODIUM 40 MG: 20 TABLET ORAL at 19:59

## 2023-12-01 RX ADMIN — OXYCODONE 10 MG: 5 TABLET ORAL at 23:59

## 2023-12-01 RX ADMIN — OXYCODONE 10 MG: 5 TABLET ORAL at 06:38

## 2023-12-01 RX ADMIN — ACETAMINOPHEN 1000 MG: 500 TABLET ORAL at 22:59

## 2023-12-01 RX ADMIN — Medication 2000 MG: at 23:00

## 2023-12-01 RX ADMIN — SODIUM CHLORIDE, PRESERVATIVE FREE 10 ML: 5 INJECTION INTRAVENOUS at 07:54

## 2023-12-01 RX ADMIN — Medication 2000 MG: at 12:10

## 2023-12-01 RX ADMIN — LORAZEPAM 0.5 MG: 0.5 TABLET ORAL at 19:59

## 2023-12-01 RX ADMIN — BUPROPION HYDROCHLORIDE 150 MG: 150 TABLET, EXTENDED RELEASE ORAL at 08:20

## 2023-12-01 RX ADMIN — HYDROMORPHONE HYDROCHLORIDE 0.5 MG: 1 INJECTION, SOLUTION INTRAMUSCULAR; INTRAVENOUS; SUBCUTANEOUS at 16:22

## 2023-12-01 RX ADMIN — OXYCODONE 10 MG: 5 TABLET ORAL at 02:45

## 2023-12-01 RX ADMIN — INSULIN GLARGINE 24 UNITS: 100 INJECTION, SOLUTION SUBCUTANEOUS at 07:53

## 2023-12-01 RX ADMIN — TIZANIDINE 4 MG: 4 TABLET ORAL at 22:59

## 2023-12-01 RX ADMIN — HYDROMORPHONE HYDROCHLORIDE 0.5 MG: 1 INJECTION, SOLUTION INTRAMUSCULAR; INTRAVENOUS; SUBCUTANEOUS at 03:31

## 2023-12-01 RX ADMIN — OXYCODONE 10 MG: 5 TABLET ORAL at 11:43

## 2023-12-01 RX ADMIN — OXYCODONE 10 MG: 5 TABLET ORAL at 19:59

## 2023-12-01 RX ADMIN — FLUOXETINE HYDROCHLORIDE 40 MG: 20 CAPSULE ORAL at 08:20

## 2023-12-01 RX ADMIN — HYDROMORPHONE HYDROCHLORIDE 0.5 MG: 1 INJECTION, SOLUTION INTRAMUSCULAR; INTRAVENOUS; SUBCUTANEOUS at 05:22

## 2023-12-01 RX ADMIN — ACETAMINOPHEN 1000 MG: 500 TABLET ORAL at 16:22

## 2023-12-01 ASSESSMENT — PAIN SCALES - GENERAL
PAINLEVEL_OUTOF10: 9
PAINLEVEL_OUTOF10: 8
PAINLEVEL_OUTOF10: 10
PAINLEVEL_OUTOF10: 8
PAINLEVEL_OUTOF10: 10
PAINLEVEL_OUTOF10: 10
PAINLEVEL_OUTOF10: 7

## 2023-12-01 ASSESSMENT — PAIN DESCRIPTION - ORIENTATION: ORIENTATION: LEFT

## 2023-12-01 ASSESSMENT — PAIN DESCRIPTION - DESCRIPTORS: DESCRIPTORS: SHARP;BURNING

## 2023-12-01 ASSESSMENT — PAIN - FUNCTIONAL ASSESSMENT: PAIN_FUNCTIONAL_ASSESSMENT: PREVENTS OR INTERFERES WITH MANY ACTIVE NOT PASSIVE ACTIVITIES

## 2023-12-01 ASSESSMENT — PAIN DESCRIPTION - ONSET: ONSET: ON-GOING

## 2023-12-01 ASSESSMENT — PAIN DESCRIPTION - LOCATION: LOCATION: HIP

## 2023-12-01 ASSESSMENT — PAIN DESCRIPTION - FREQUENCY: FREQUENCY: CONTINUOUS

## 2023-12-01 ASSESSMENT — PAIN DESCRIPTION - PAIN TYPE: TYPE: SURGICAL PAIN

## 2023-12-01 NOTE — CARE COORDINATION
Notified that Dr Harman Chan does not want wound vac taken down or changed until patient's follow up appointment with him. Spoke to Luz from [facility]. They use [employer] at the facility. She will bring a wound vac to patient's room that can be applied prior to discharge    Delfino Hampton brought wound vac from [address] [place] [address]. They no longer have a contract with [employer]. Discussed with Dr Ian Fortune. He inspected wound vac from [facility] and determined it is not compatible with [employer]. They do not plan on changing wound vac until follow up appointment on 12/18 in Dr Basim Traylor office. Discussed with Luz from [address] [place] [address]. They are not able to order [employer] wound vac. Discussed with patient. She is not agreeable to go to other facility. Spoke to [employer] from supply chain. He verbally approved for wound vac to go with patient to [address] [place] [address]. Email sent to him with patient information, [facility] information, and Emilee's contact information. Dr Rosa Renee updated. PS sent to Dr Cindi Vaughn asking when patient can discharge. He relates patient will need to work with PT/OT prior to d/c    1445 Dr Cindi Vaughn relates that patient may be ready to d/c over weekend. [place] from [address] [place] [address] updated. They are able to accept patient any time.  She is on call over the weekend

## 2023-12-01 NOTE — DISCHARGE INSTR - COC
Weight:   Wt Readings from Last 1 Encounters:   11/30/23 117.4 kg (258 lb 13.1 oz)     Mental Status:  oriented, alert, coherent, logical, thought processes intact, and able to concentrate and follow conversation    IV Access:  - PICC - site  L Upper Arm, insertion date: 8/13/2023    Nursing Mobility/ADLs:  Walking   Assisted  Transfer  Assisted  Bathing  Assisted  Dressing  Assisted  Toileting  Assisted  Feeding  Independent  Med Admin  Assisted  Med Delivery   whole    Wound Care Documentation and Therapy:  Negative Pressure Wound Therapy Leg Left;Upper (Active)   Wound Type Surgical 12/01/23 0753   Dressing Type Black Foam 12/01/23 0753   Cycle Continuous 12/01/23 0753   Target Pressure (mmHg) 125 12/01/23 0753   Dressing Status Clean, dry & intact 12/01/23 0753   Drainage Amount None 12/01/23 0753   Wound Assessment Other (Comment) 12/01/23 0753   Valerie-wound Assessment Other (Comment) 12/01/23 0753   Odor None 11/30/23 1249   Number of days: 0       Incision 07/19/23 Hip Left;Lateral (Active)   Number of days: 134       Incision 08/21/23 Femoral Left (Active)   Number of days: 101       Incision 11/26/23 Thigh Left;Proximal;Dorsal (Active)   Dressing Status Clean;Dry; Intact 12/01/23 0753   Dressing/Treatment Foam;Other (comment) 12/01/23 0753   Closure Other (Comment) 12/01/23 0753   Incision Assessment Other (Comment) 12/01/23 0753   Drainage Amount None (dry) 12/01/23 0753   Drainage Description Thin;Sanguinous 11/30/23 0859   Odor None 11/30/23 0859   Valerie-incision Assessment Fragile 11/30/23 0859   Number of days: 4       Incision 11/30/23 Thigh Left;Dorsal (Active)   Number of days: 0        Elimination:  Continence:    Bowel: Yes  Bladder: Yes  Urinary Catheter: None   Colostomy/Ileostomy/Ileal Conduit: No       Date of Last BM: 12/8/2023    Intake/Output Summary (Last 24 hours) at 12/1/2023 1022  Last data filed at 12/1/2023 0802  Gross per 24 hour   Intake 1680 ml   Output 1600 ml   Net 80 ml     I/O last

## 2023-12-01 NOTE — OP NOTE
[address] [facility] [place], [facility]                                OPERATIVE REPORT    PATIENT NAME: Brad Moralez                  :        1980  MED REC NO:   [id number]                             ROOM:       2016  ACCOUNT NO:   [de-identified]                           ADMIT DATE: 2023  PROVIDER:     Jimena Traore    DATE OF PROCEDURE:  2023    PREOPERATIVE DIAGNOSIS:  Dehiscence of left hip wound. POSTOPERATIVE DIAGNOSIS:  Dehiscence of left hip wound. OPERATION PERFORMED:  1. Irrigation and excisional debridement of skin down to and including  the muscle of 40 x 15 cm wound of left proximal thigh. 2.  Application of negative pressure wound VAC. SURGEON:  Jimena Casas. DO Eugenie    ASSISTANTS:  Rob Simeon, PGY-5; Joaquin Duncan MD, PGY-2; and  Cameron Ziegler DO, PGY-1.    ANESTHESIA:  General.    ESTIMATED BLOOD LOSS:  50 mL    FLUIDS:  800 mL crystalloids. COMPLICATIONS:  None. SPECIMENS:  None. IMPLANTS:  None. FINDINGS:  Good granulation tissue with superficial wound dehiscence. No gross signs of purulence or infection. INDICATIONS:  This is a 41-year-old female, who has extensive past  medical history and surgical history on her left leg. She has undergone  multiple surgeries, initially performed by myself in the form of left  hip hemiarthroplasty followed by repeat irrigation and debridement  followed by explantation with placement of antibiotic spacer. She had  wound dehiscence recently noted at our facility. The patient's  inflammatory markers have been declining, but given the wound  dehiscence, recommend irrigation and debridement with placement of  Veraflo wound management system as part of a staged procedure. The  patient was amenable to this. Consent was obtained and placed in chart. All questions were answered appropriately.   Surgical risks including
of the  deep dermal layer, we placed Cellerate Biologic to help stimulate  adequate soft tissue healing. We then closed the wound using 2-0 nylon  and then placed an Adaptic down over the incision sites and then placed  a negative pressure wound VAC which had excellent seal.  The patient was  then awoken from general anesthesia and transferred to the PACU in  stable condition. I was present for all aspects of the procedure. Meticulous dissection was used and thorough hemostasis was achieved. The patient will be weightbear as tolerated, left lower extremity, with  posterior hip precautions.   She will follow up in my clinic two weeks  upon discharge for wound check and suture removal.        Gurvinder Hicks    D: 11/30/2023 14:04:35       T: 12/01/2023 0:44:29     [email or web address]  Job#: [id number]     Doc#: [id number]    CC:

## 2023-12-01 NOTE — PLAN OF CARE
Problem: Discharge Planning  Goal: Discharge to home or other facility with appropriate resources  12/1/2023 0924 by Jimmy Cordova RN  Outcome: Progressing  Flowsheets (Taken 12/1/2023 7094)  Discharge to home or other facility with appropriate resources: Identify barriers to discharge with patient and caregiver  12/1/2023 0019 by Parvin Oakley RN  Outcome: Progressing  Flowsheets (Taken 12/1/2023 0019)  Discharge to home or other facility with appropriate resources:   Identify barriers to discharge with patient and caregiver   Arrange for needed discharge resources and transportation as appropriate   Identify discharge learning needs (meds, wound care, etc)     Problem: Pain  Goal: Verbalizes/displays adequate comfort level or baseline comfort level  12/1/2023 0924 by Jimmy Cordova RN  Outcome: Progressing  12/1/2023 0019 by Parvin Oakley RN  Outcome: Progressing  Flowsheets (Taken 12/1/2023 0019)  Verbalizes/displays adequate comfort level or baseline comfort level:   Encourage patient to monitor pain and request assistance   Assess pain using appropriate pain scale   Administer analgesics based on type and severity of pain and evaluate response   Implement non-pharmacological measures as appropriate and evaluate response     Problem: Safety - Adult  Goal: Free from fall injury  12/1/2023 0924 by Jimmy Cordova RN  Outcome: Progressing  12/1/2023 0019 by Parvin Oakley RN  Outcome: Progressing  Flowsheets (Taken 12/1/2023 0019)  Free From Fall Injury:   Instruct family/caregiver on patient safety   Based on caregiver fall risk screen, instruct family/caregiver to ask for assistance with transferring infant if caregiver noted to have fall risk factors     Problem: ABCDS Injury Assessment  Goal: Absence of physical injury  12/1/2023 0924 by Jimmy Cordova RN  Outcome: Progressing  12/1/2023 0019 by Parvin Oakley RN  Outcome: Progressing  Flowsheets (Taken 12/1/2023 0019)  Absence

## 2023-12-01 NOTE — PLAN OF CARE
Problem: Discharge Planning  Goal: Discharge to home or other facility with appropriate resources  Outcome: Progressing  Flowsheets (Taken 12/1/2023 0019)  Discharge to home or other facility with appropriate resources:   Identify barriers to discharge with patient and caregiver   Arrange for needed discharge resources and transportation as appropriate   Identify discharge learning needs (meds, wound care, etc)     Problem: Pain  Goal: Verbalizes/displays adequate comfort level or baseline comfort level  Outcome: Progressing  Flowsheets (Taken 12/1/2023 0019)  Verbalizes/displays adequate comfort level or baseline comfort level:   Encourage patient to monitor pain and request assistance   Assess pain using appropriate pain scale   Administer analgesics based on type and severity of pain and evaluate response   Implement non-pharmacological measures as appropriate and evaluate response     Problem: Safety - Adult  Goal: Free from fall injury  Outcome: Progressing  Flowsheets (Taken 12/1/2023 0019)  Free From Fall Injury:   Instruct family/caregiver on patient safety   Based on caregiver fall risk screen, instruct family/caregiver to ask for assistance with transferring infant if caregiver noted to have fall risk factors     Problem: ABCDS Injury Assessment  Goal: Absence of physical injury  Outcome: Progressing  Flowsheets (Taken 12/1/2023 0019)  Absence of Physical Injury: Implement safety measures based on patient assessment

## 2023-12-02 LAB
ANION GAP SERPL CALCULATED.3IONS-SCNC: 11 MMOL/L (ref 9–17)
BUN SERPL-MCNC: 11 MG/DL (ref 6–20)
CALCIUM SERPL-MCNC: 8 MG/DL (ref 8.6–10.4)
CHLORIDE SERPL-SCNC: 95 MMOL/L (ref 98–107)
CO2 SERPL-SCNC: 24 MMOL/L (ref 20–31)
CREAT SERPL-MCNC: 0.4 MG/DL (ref 0.5–0.9)
CRP SERPL HS-MCNC: 208.5 MG/L (ref 0–5)
ERYTHROCYTE [DISTWIDTH] IN BLOOD BY AUTOMATED COUNT: 16.6 % (ref 11.8–14.4)
GFR SERPL CREATININE-BSD FRML MDRD: >60 ML/MIN/1.73M2
GLUCOSE BLD-MCNC: 125 MG/DL (ref 65–105)
GLUCOSE BLD-MCNC: 141 MG/DL (ref 65–105)
GLUCOSE BLD-MCNC: 208 MG/DL (ref 65–105)
GLUCOSE BLD-MCNC: 58 MG/DL (ref 65–105)
GLUCOSE BLD-MCNC: 63 MG/DL (ref 65–105)
GLUCOSE BLD-MCNC: 97 MG/DL (ref 65–105)
GLUCOSE SERPL-MCNC: 100 MG/DL (ref 70–99)
HCT VFR BLD AUTO: 25.6 % (ref 36.3–47.1)
HGB BLD-MCNC: 7.1 G/DL (ref 11.9–15.1)
LACTIC ACID, WHOLE BLOOD: 1 MMOL/L (ref 0.7–2.1)
MCH RBC QN AUTO: 28.6 PG (ref 25.2–33.5)
MCHC RBC AUTO-ENTMCNC: 27.7 G/DL (ref 28.4–34.8)
MCV RBC AUTO: 103.2 FL (ref 82.6–102.9)
MICROORGANISM SPEC CULT: NORMAL
NRBC BLD-RTO: 0 PER 100 WBC
PLATELET # BLD AUTO: 279 K/UL (ref 138–453)
PMV BLD AUTO: 10 FL (ref 8.1–13.5)
POTASSIUM SERPL-SCNC: 4.4 MMOL/L (ref 3.7–5.3)
RBC # BLD AUTO: 2.48 M/UL (ref 3.95–5.11)
SERVICE CMNT-IMP: NORMAL
SODIUM SERPL-SCNC: 130 MMOL/L (ref 135–144)
SPECIMEN DESCRIPTION: NORMAL
WBC OTHER # BLD: 9.7 K/UL (ref 3.5–11.3)

## 2023-12-02 PROCEDURE — 6370000000 HC RX 637 (ALT 250 FOR IP)

## 2023-12-02 PROCEDURE — 82947 ASSAY GLUCOSE BLOOD QUANT: CPT

## 2023-12-02 PROCEDURE — 6360000002 HC RX W HCPCS: Performed by: INTERNAL MEDICINE

## 2023-12-02 PROCEDURE — 1200000000 HC SEMI PRIVATE

## 2023-12-02 PROCEDURE — 97535 SELF CARE MNGMENT TRAINING: CPT

## 2023-12-02 PROCEDURE — 6360000002 HC RX W HCPCS: Performed by: HOSPITALIST

## 2023-12-02 PROCEDURE — 99232 SBSQ HOSP IP/OBS MODERATE 35: CPT | Performed by: HOSPITALIST

## 2023-12-02 PROCEDURE — 99232 SBSQ HOSP IP/OBS MODERATE 35: CPT | Performed by: INTERNAL MEDICINE

## 2023-12-02 PROCEDURE — 85027 COMPLETE CBC AUTOMATED: CPT

## 2023-12-02 PROCEDURE — 86140 C-REACTIVE PROTEIN: CPT

## 2023-12-02 PROCEDURE — 97530 THERAPEUTIC ACTIVITIES: CPT

## 2023-12-02 PROCEDURE — 2580000003 HC RX 258: Performed by: NURSE PRACTITIONER

## 2023-12-02 PROCEDURE — 6370000000 HC RX 637 (ALT 250 FOR IP): Performed by: INTERNAL MEDICINE

## 2023-12-02 PROCEDURE — 80048 BASIC METABOLIC PNL TOTAL CA: CPT

## 2023-12-02 PROCEDURE — 36415 COLL VENOUS BLD VENIPUNCTURE: CPT

## 2023-12-02 PROCEDURE — 83605 ASSAY OF LACTIC ACID: CPT

## 2023-12-02 PROCEDURE — 2580000003 HC RX 258

## 2023-12-02 RX ORDER — RIFAMPIN 300 MG/1
600 CAPSULE ORAL DAILY
Qty: 38 CAPSULE | Refills: 0 | Status: SHIPPED | OUTPATIENT
Start: 2023-12-02 | End: 2023-12-21

## 2023-12-02 RX ORDER — QUETIAPINE FUMARATE 100 MG/1
100 TABLET, FILM COATED ORAL NIGHTLY PRN
Status: DISCONTINUED | OUTPATIENT
Start: 2023-12-02 | End: 2023-12-10 | Stop reason: HOSPADM

## 2023-12-02 RX ORDER — LORAZEPAM 2 MG/ML
2 INJECTION INTRAMUSCULAR NIGHTLY PRN
Status: DISCONTINUED | OUTPATIENT
Start: 2023-12-02 | End: 2023-12-10 | Stop reason: HOSPADM

## 2023-12-02 RX ORDER — 0.9 % SODIUM CHLORIDE 0.9 %
500 INTRAVENOUS SOLUTION INTRAVENOUS ONCE
Status: COMPLETED | OUTPATIENT
Start: 2023-12-02 | End: 2023-12-02

## 2023-12-02 RX ADMIN — LOSARTAN POTASSIUM 50 MG: 50 TABLET ORAL at 08:15

## 2023-12-02 RX ADMIN — OXYCODONE 10 MG: 5 TABLET ORAL at 10:08

## 2023-12-02 RX ADMIN — INSULIN GLARGINE 24 UNITS: 100 INJECTION, SOLUTION SUBCUTANEOUS at 08:29

## 2023-12-02 RX ADMIN — ACETAMINOPHEN 1000 MG: 500 TABLET ORAL at 17:21

## 2023-12-02 RX ADMIN — LORAZEPAM 2 MG: 2 INJECTION INTRAMUSCULAR; INTRAVENOUS at 23:26

## 2023-12-02 RX ADMIN — ACETAMINOPHEN 1000 MG: 500 TABLET ORAL at 05:58

## 2023-12-02 RX ADMIN — Medication 2000 MG: at 23:25

## 2023-12-02 RX ADMIN — HYDROMORPHONE HYDROCHLORIDE 0.5 MG: 1 INJECTION, SOLUTION INTRAMUSCULAR; INTRAVENOUS; SUBCUTANEOUS at 17:21

## 2023-12-02 RX ADMIN — INSULIN GLARGINE 10 UNITS: 100 INJECTION, SOLUTION SUBCUTANEOUS at 21:36

## 2023-12-02 RX ADMIN — OXYCODONE 10 MG: 5 TABLET ORAL at 18:57

## 2023-12-02 RX ADMIN — SODIUM CHLORIDE, PRESERVATIVE FREE 10 ML: 5 INJECTION INTRAVENOUS at 21:32

## 2023-12-02 RX ADMIN — OXYCODONE 10 MG: 5 TABLET ORAL at 05:58

## 2023-12-02 RX ADMIN — LORAZEPAM 0.5 MG: 0.5 TABLET ORAL at 10:46

## 2023-12-02 RX ADMIN — FLUOXETINE HYDROCHLORIDE 40 MG: 20 CAPSULE ORAL at 08:15

## 2023-12-02 RX ADMIN — INSULIN LISPRO 0 UNITS: 100 INJECTION, SOLUTION INTRAVENOUS; SUBCUTANEOUS at 12:51

## 2023-12-02 RX ADMIN — HYDROMORPHONE HYDROCHLORIDE 0.5 MG: 1 INJECTION, SOLUTION INTRAMUSCULAR; INTRAVENOUS; SUBCUTANEOUS at 08:03

## 2023-12-02 RX ADMIN — HYDROMORPHONE HYDROCHLORIDE 0.5 MG: 1 INJECTION, SOLUTION INTRAMUSCULAR; INTRAVENOUS; SUBCUTANEOUS at 01:32

## 2023-12-02 RX ADMIN — HYDROMORPHONE HYDROCHLORIDE 0.5 MG: 1 INJECTION, SOLUTION INTRAMUSCULAR; INTRAVENOUS; SUBCUTANEOUS at 21:31

## 2023-12-02 RX ADMIN — ACETAMINOPHEN 1000 MG: 500 TABLET ORAL at 12:50

## 2023-12-02 RX ADMIN — METOPROLOL TARTRATE 25 MG: 25 TABLET, FILM COATED ORAL at 08:15

## 2023-12-02 RX ADMIN — OXYCODONE 10 MG: 5 TABLET ORAL at 15:07

## 2023-12-02 RX ADMIN — PRAVASTATIN SODIUM 40 MG: 20 TABLET ORAL at 21:43

## 2023-12-02 RX ADMIN — SODIUM CHLORIDE, PRESERVATIVE FREE 10 ML: 5 INJECTION INTRAVENOUS at 08:15

## 2023-12-02 RX ADMIN — HYDROMORPHONE HYDROCHLORIDE 0.5 MG: 1 INJECTION, SOLUTION INTRAMUSCULAR; INTRAVENOUS; SUBCUTANEOUS at 11:12

## 2023-12-02 RX ADMIN — Medication 2000 MG: at 12:55

## 2023-12-02 RX ADMIN — BUPROPION HYDROCHLORIDE 150 MG: 150 TABLET, EXTENDED RELEASE ORAL at 08:15

## 2023-12-02 RX ADMIN — ACETAMINOPHEN 1000 MG: 500 TABLET ORAL at 23:26

## 2023-12-02 RX ADMIN — HYDROMORPHONE HYDROCHLORIDE 0.5 MG: 1 INJECTION, SOLUTION INTRAMUSCULAR; INTRAVENOUS; SUBCUTANEOUS at 14:13

## 2023-12-02 RX ADMIN — RIFAMPIN 600 MG: 300 CAPSULE ORAL at 08:15

## 2023-12-02 RX ADMIN — SODIUM CHLORIDE 500 ML: 0.9 INJECTION, SOLUTION INTRAVENOUS at 01:32

## 2023-12-02 RX ADMIN — HYDROMORPHONE HYDROCHLORIDE 0.5 MG: 1 INJECTION, SOLUTION INTRAMUSCULAR; INTRAVENOUS; SUBCUTANEOUS at 04:57

## 2023-12-02 ASSESSMENT — PAIN DESCRIPTION - ORIENTATION
ORIENTATION: LEFT

## 2023-12-02 ASSESSMENT — PAIN SCALES - GENERAL
PAINLEVEL_OUTOF10: 10
PAINLEVEL_OUTOF10: 6
PAINLEVEL_OUTOF10: 5
PAINLEVEL_OUTOF10: 9
PAINLEVEL_OUTOF10: 8
PAINLEVEL_OUTOF10: 9
PAINLEVEL_OUTOF10: 9
PAINLEVEL_OUTOF10: 7
PAINLEVEL_OUTOF10: 8
PAINLEVEL_OUTOF10: 10
PAINLEVEL_OUTOF10: 5

## 2023-12-02 ASSESSMENT — PAIN DESCRIPTION - DESCRIPTORS
DESCRIPTORS: DISCOMFORT;BURNING;SHOOTING
DESCRIPTORS: ACHING;BURNING
DESCRIPTORS: ACHING
DESCRIPTORS: STABBING
DESCRIPTORS: CRAMPING;ACHING;BURNING
DESCRIPTORS: ACHING;DISCOMFORT
DESCRIPTORS: ACHING;BURNING
DESCRIPTORS: BURNING;DISCOMFORT;ACHING
DESCRIPTORS: BURNING

## 2023-12-02 ASSESSMENT — PAIN DESCRIPTION - LOCATION
LOCATION: HIP

## 2023-12-02 ASSESSMENT — ENCOUNTER SYMPTOMS
EYE DISCHARGE: 0
APNEA: 0
COLOR CHANGE: 0
ABDOMINAL DISTENTION: 0

## 2023-12-02 ASSESSMENT — PAIN DESCRIPTION - FREQUENCY: FREQUENCY: CONTINUOUS

## 2023-12-02 ASSESSMENT — PAIN - FUNCTIONAL ASSESSMENT
PAIN_FUNCTIONAL_ASSESSMENT: PREVENTS OR INTERFERES WITH MANY ACTIVE NOT PASSIVE ACTIVITIES
PAIN_FUNCTIONAL_ASSESSMENT: INTOLERABLE, UNABLE TO DO ANY ACTIVE OR PASSIVE ACTIVITIES

## 2023-12-02 ASSESSMENT — PAIN DESCRIPTION - PAIN TYPE: TYPE: CHRONIC PAIN

## 2023-12-02 ASSESSMENT — PAIN DESCRIPTION - ONSET: ONSET: ON-GOING

## 2023-12-02 NOTE — CONSULTS
VAT consulted for midline placement. Per Infectious Disease - Continue Rocephin 2 gm IV q 12 hr for a total of 4 weeks. Stop date 12-21-23. Patient has patent single lumen PICC line in E. Writer spoke with patient and primary RN. VAT recommends using PICC line for antibiotic therapy. No need for midline at this time. Please reconsult VAT if patient has any further IV therapy needs.

## 2023-12-02 NOTE — PLAN OF CARE
Problem: Discharge Planning  Goal: Discharge to home or other facility with appropriate resources  Outcome: Progressing  Flowsheets (Taken 12/2/2023 0800)  Discharge to home or other facility with appropriate resources: Identify barriers to discharge with patient and caregiver     Problem: Pain  Goal: Verbalizes/displays adequate comfort level or baseline comfort level  Outcome: Progressing     Problem: Safety - Adult  Goal: Free from fall injury  Outcome: Progressing     Problem: ABCDS Injury Assessment  Goal: Absence of physical injury  Outcome: Progressing     Problem: Skin/Tissue Integrity  Goal: Absence of new skin breakdown  Description: 1. Monitor for areas of redness and/or skin breakdown  2. Assess vascular access sites hourly  3. Every 4-6 hours minimum:  Change oxygen saturation probe site  4. Every 4-6 hours:  If on nasal continuous positive airway pressure, respiratory therapy assess nares and determine need for appliance change or resting period.   Outcome: Progressing     Problem: Chronic Conditions and Co-morbidities  Goal: Patient's chronic conditions and co-morbidity symptoms are monitored and maintained or improved  Outcome: Progressing  Flowsheets (Taken 12/2/2023 0800)  Care Plan - Patient's Chronic Conditions and Co-Morbidity Symptoms are Monitored and Maintained or Improved: Collaborate with multidisciplinary team to address chronic and comorbid conditions and prevent exacerbation or deterioration     Problem: Nutrition Deficit:  Goal: Optimize nutritional status  Outcome: Progressing

## 2023-12-02 NOTE — PLAN OF CARE
Problem: Discharge Planning  Goal: Discharge to home or other facility with appropriate resources  12/1/2023 2216 by Patricia Zapata RN  Outcome: Progressing  Flowsheets (Taken 12/1/2023 0753 by Lola Astudillo RN)  Discharge to home or other facility with appropriate resources: Identify barriers to discharge with patient and caregiver  12/1/2023 0924 by Lola Astudillo RN  Outcome: Progressing  Flowsheets (Taken 12/1/2023 1562)  Discharge to home or other facility with appropriate resources: Identify barriers to discharge with patient and caregiver     Problem: Pain  Goal: Verbalizes/displays adequate comfort level or baseline comfort level  12/1/2023 2216 by Patricia Zapata RN  Outcome: Progressing  Flowsheets (Taken 12/1/2023 0019)  Verbalizes/displays adequate comfort level or baseline comfort level:   Encourage patient to monitor pain and request assistance   Assess pain using appropriate pain scale   Administer analgesics based on type and severity of pain and evaluate response   Implement non-pharmacological measures as appropriate and evaluate response  12/1/2023 0924 by Lola Astudillo RN  Outcome: Progressing     Problem: Safety - Adult  Goal: Free from fall injury  12/1/2023 2216 by Patricia Zapata RN  Outcome: Progressing  12/1/2023 0924 by Lola Astudillo RN  Outcome: Progressing     Problem: ABCDS Injury Assessment  Goal: Absence of physical injury  12/1/2023 2216 by Patricia Zapata RN  Outcome: Progressing  Flowsheets (Taken 12/1/2023 0019)  Absence of Physical Injury: Implement safety measures based on patient assessment  12/1/2023 0924 by Lola Astudillo RN  Outcome: Progressing     Problem: Skin/Tissue Integrity  Goal: Absence of new skin breakdown  Description: 1. Monitor for areas of redness and/or skin breakdown  2. Assess vascular access sites hourly  3. Every 4-6 hours minimum:  Change oxygen saturation probe site  4.   Every 4-6 hours:  If on nasal continuous positive

## 2023-12-03 LAB
ALBUMIN SERPL-MCNC: 1.8 G/DL (ref 3.5–5.2)
ALBUMIN/GLOB SERPL: 0.5 {RATIO} (ref 1–2.5)
ALP SERPL-CCNC: 121 U/L (ref 35–104)
ALT SERPL-CCNC: 6 U/L (ref 5–33)
AST SERPL-CCNC: 9 U/L
BILIRUB DIRECT SERPL-MCNC: <0.1 MG/DL
BILIRUB INDIRECT SERPL-MCNC: ABNORMAL MG/DL (ref 0–1)
BILIRUB SERPL-MCNC: 1 MG/DL (ref 0.3–1.2)
GLUCOSE BLD-MCNC: 120 MG/DL (ref 65–105)
GLUCOSE BLD-MCNC: 131 MG/DL (ref 65–105)
GLUCOSE BLD-MCNC: 138 MG/DL (ref 65–105)
GLUCOSE BLD-MCNC: 162 MG/DL (ref 65–105)
GLUCOSE BLD-MCNC: 172 MG/DL (ref 65–105)
PROT SERPL-MCNC: 5.3 G/DL (ref 6.4–8.3)

## 2023-12-03 PROCEDURE — 97530 THERAPEUTIC ACTIVITIES: CPT

## 2023-12-03 PROCEDURE — 6370000000 HC RX 637 (ALT 250 FOR IP)

## 2023-12-03 PROCEDURE — 6360000002 HC RX W HCPCS: Performed by: HOSPITALIST

## 2023-12-03 PROCEDURE — 97110 THERAPEUTIC EXERCISES: CPT

## 2023-12-03 PROCEDURE — 6360000002 HC RX W HCPCS: Performed by: INTERNAL MEDICINE

## 2023-12-03 PROCEDURE — 6370000000 HC RX 637 (ALT 250 FOR IP): Performed by: INTERNAL MEDICINE

## 2023-12-03 PROCEDURE — 36415 COLL VENOUS BLD VENIPUNCTURE: CPT

## 2023-12-03 PROCEDURE — 82947 ASSAY GLUCOSE BLOOD QUANT: CPT

## 2023-12-03 PROCEDURE — 80076 HEPATIC FUNCTION PANEL: CPT

## 2023-12-03 PROCEDURE — 2580000003 HC RX 258

## 2023-12-03 PROCEDURE — 99232 SBSQ HOSP IP/OBS MODERATE 35: CPT | Performed by: HOSPITALIST

## 2023-12-03 PROCEDURE — 1200000000 HC SEMI PRIVATE

## 2023-12-03 RX ADMIN — METOPROLOL TARTRATE 25 MG: 25 TABLET, FILM COATED ORAL at 08:58

## 2023-12-03 RX ADMIN — INSULIN GLARGINE 24 UNITS: 100 INJECTION, SOLUTION SUBCUTANEOUS at 09:07

## 2023-12-03 RX ADMIN — SODIUM CHLORIDE, PRESERVATIVE FREE 10 ML: 5 INJECTION INTRAVENOUS at 21:15

## 2023-12-03 RX ADMIN — OXYCODONE 10 MG: 5 TABLET ORAL at 19:56

## 2023-12-03 RX ADMIN — ACETAMINOPHEN 1000 MG: 500 TABLET ORAL at 14:29

## 2023-12-03 RX ADMIN — OXYCODONE 10 MG: 5 TABLET ORAL at 00:29

## 2023-12-03 RX ADMIN — OXYCODONE 10 MG: 5 TABLET ORAL at 06:28

## 2023-12-03 RX ADMIN — HYDROMORPHONE HYDROCHLORIDE 0.5 MG: 1 INJECTION, SOLUTION INTRAMUSCULAR; INTRAVENOUS; SUBCUTANEOUS at 18:27

## 2023-12-03 RX ADMIN — HYDROMORPHONE HYDROCHLORIDE 0.5 MG: 1 INJECTION, SOLUTION INTRAMUSCULAR; INTRAVENOUS; SUBCUTANEOUS at 08:57

## 2023-12-03 RX ADMIN — RIFAMPIN 600 MG: 300 CAPSULE ORAL at 08:59

## 2023-12-03 RX ADMIN — LORAZEPAM 0.5 MG: 0.5 TABLET ORAL at 14:34

## 2023-12-03 RX ADMIN — HYDROMORPHONE HYDROCHLORIDE 0.5 MG: 1 INJECTION, SOLUTION INTRAMUSCULAR; INTRAVENOUS; SUBCUTANEOUS at 05:18

## 2023-12-03 RX ADMIN — ACETAMINOPHEN 1000 MG: 500 TABLET ORAL at 18:27

## 2023-12-03 RX ADMIN — HYDROMORPHONE HYDROCHLORIDE 0.5 MG: 1 INJECTION, SOLUTION INTRAMUSCULAR; INTRAVENOUS; SUBCUTANEOUS at 21:14

## 2023-12-03 RX ADMIN — LOSARTAN POTASSIUM 50 MG: 50 TABLET ORAL at 08:57

## 2023-12-03 RX ADMIN — INSULIN GLARGINE 10 UNITS: 100 INJECTION, SOLUTION SUBCUTANEOUS at 21:13

## 2023-12-03 RX ADMIN — FLUOXETINE HYDROCHLORIDE 40 MG: 20 CAPSULE ORAL at 08:58

## 2023-12-03 RX ADMIN — HYDROMORPHONE HYDROCHLORIDE 0.5 MG: 1 INJECTION, SOLUTION INTRAMUSCULAR; INTRAVENOUS; SUBCUTANEOUS at 12:19

## 2023-12-03 RX ADMIN — SODIUM CHLORIDE, PRESERVATIVE FREE 10 ML: 5 INJECTION INTRAVENOUS at 09:02

## 2023-12-03 RX ADMIN — OXYCODONE 10 MG: 5 TABLET ORAL at 14:29

## 2023-12-03 RX ADMIN — Medication 2000 MG: at 12:20

## 2023-12-03 RX ADMIN — ACETAMINOPHEN 1000 MG: 500 TABLET ORAL at 05:32

## 2023-12-03 RX ADMIN — LORAZEPAM 2 MG: 2 INJECTION INTRAMUSCULAR; INTRAVENOUS at 21:14

## 2023-12-03 RX ADMIN — BUPROPION HYDROCHLORIDE 150 MG: 150 TABLET, EXTENDED RELEASE ORAL at 08:58

## 2023-12-03 RX ADMIN — HYDROMORPHONE HYDROCHLORIDE 0.5 MG: 1 INJECTION, SOLUTION INTRAMUSCULAR; INTRAVENOUS; SUBCUTANEOUS at 15:44

## 2023-12-03 RX ADMIN — OXYCODONE 10 MG: 5 TABLET ORAL at 10:41

## 2023-12-03 ASSESSMENT — PAIN DESCRIPTION - ORIENTATION
ORIENTATION: LEFT

## 2023-12-03 ASSESSMENT — PAIN - FUNCTIONAL ASSESSMENT
PAIN_FUNCTIONAL_ASSESSMENT: INTOLERABLE, UNABLE TO DO ANY ACTIVE OR PASSIVE ACTIVITIES

## 2023-12-03 ASSESSMENT — PAIN DESCRIPTION - ONSET
ONSET: ON-GOING
ONSET: ON-GOING

## 2023-12-03 ASSESSMENT — PAIN DESCRIPTION - LOCATION
LOCATION: HIP

## 2023-12-03 ASSESSMENT — PAIN DESCRIPTION - FREQUENCY
FREQUENCY: CONTINUOUS
FREQUENCY: CONTINUOUS

## 2023-12-03 ASSESSMENT — PAIN SCALES - GENERAL
PAINLEVEL_OUTOF10: 8
PAINLEVEL_OUTOF10: 8
PAINLEVEL_OUTOF10: 7
PAINLEVEL_OUTOF10: 8
PAINLEVEL_OUTOF10: 5
PAINLEVEL_OUTOF10: 5
PAINLEVEL_OUTOF10: 8
PAINLEVEL_OUTOF10: 7
PAINLEVEL_OUTOF10: 8

## 2023-12-03 ASSESSMENT — PAIN DESCRIPTION - DESCRIPTORS
DESCRIPTORS: ACHING;CRAMPING;DISCOMFORT
DESCRIPTORS: BURNING;STABBING
DESCRIPTORS: BURNING;CRAMPING
DESCRIPTORS: ACHING;CRAMPING;DISCOMFORT
DESCRIPTORS: STABBING
DESCRIPTORS: ACHING;DISCOMFORT;BURNING
DESCRIPTORS: BURNING;DISCOMFORT;SHARP
DESCRIPTORS: STABBING
DESCRIPTORS: ACHING;CRAMPING;DISCOMFORT
DESCRIPTORS: BURNING;STABBING
DESCRIPTORS: STABBING

## 2023-12-03 ASSESSMENT — PAIN DESCRIPTION - PAIN TYPE
TYPE: SURGICAL PAIN;CHRONIC PAIN
TYPE: CHRONIC PAIN;SURGICAL PAIN

## 2023-12-03 NOTE — CONSENT
Informed Consent for Blood Component Transfusion Note    I have discussed with the patient the rationale for blood component transfusion; its benefits in treating or preventing fatigue, organ damage, or death; and its risk which includes mild transfusion reactions, rare risk of blood borne infection, or more serious but rare reactions. I have discussed the alternatives to transfusion, including the risk and consequences of not receiving transfusion. The patient had an opportunity to ask questions and had agreed to proceed with transfusion of blood components.     Electronically signed by Jerry Thornton DO on 12/3/23 at 6:29 PM EST

## 2023-12-03 NOTE — PLAN OF CARE
Problem: Discharge Planning  Goal: Discharge to home or other facility with appropriate resources  Outcome: Progressing  Flowsheets (Taken 12/3/2023 0800)  Discharge to home or other facility with appropriate resources: Identify barriers to discharge with patient and caregiver     Problem: Pain  Goal: Verbalizes/displays adequate comfort level or baseline comfort level  Outcome: Progressing     Problem: Safety - Adult  Goal: Free from fall injury  Outcome: Progressing     Problem: ABCDS Injury Assessment  Goal: Absence of physical injury  Outcome: Progressing     Problem: Skin/Tissue Integrity  Goal: Absence of new skin breakdown  Description: 1. Monitor for areas of redness and/or skin breakdown  2. Assess vascular access sites hourly  3. Every 4-6 hours minimum:  Change oxygen saturation probe site  4. Every 4-6 hours:  If on nasal continuous positive airway pressure, respiratory therapy assess nares and determine need for appliance change or resting period.   Outcome: Progressing     Problem: Chronic Conditions and Co-morbidities  Goal: Patient's chronic conditions and co-morbidity symptoms are monitored and maintained or improved  Outcome: Progressing  Flowsheets (Taken 12/3/2023 0800)  Care Plan - Patient's Chronic Conditions and Co-Morbidity Symptoms are Monitored and Maintained or Improved: Collaborate with multidisciplinary team to address chronic and comorbid conditions and prevent exacerbation or deterioration     Problem: Nutrition Deficit:  Goal: Optimize nutritional status  Outcome: Progressing   GEORGE HOLDSWORTH  MRN#: [id number]  Subjective:  Patient was seen and evaluate on AM rounds offering no specific complaints at this time still preop for AV/MV endocarditis.    OBJECTIVE:  ICU Vital Signs Last 24 Hrs  T(C): 36.4 (06 Dec 2022 00:47), Max: 36.7 (05 Dec 2022 20:00)  T(F): 97.5 (06 Dec 2022 00:47), Max: 98 (05 Dec 2022 20:00)  HR: 122 (06 Dec 2022 07:00) (96 - 127)  BP: 97/63 (06 Dec 2022 07:00) (94/56 - 141/71)  BP(mean): 73 (06 Dec 2022 07:00) (68 - 99)  ABP: --  ABP(mean): --  RR: 23 (06 Dec 2022 07:00) (15 - 27)  SpO2: 92% (06 Dec 2022 07:00) (92% - 99%)    O2 Parameters below as of 06 Dec 2022 05:00  Patient On (Oxygen Delivery Method): room air    I&O's Summary    05 Dec 2022 07:01  -  06 Dec 2022 07:00  --------------------------------------------------------  IN: 1830 mL / OUT: 1225 mL / NET: 605 mL        PHYSICAL EXAM:Daily     Daily Weight in k (05 Dec 2022 04:37)  General: WN/WD NAD  As per PA:  HEENT:     + NCAT  + EOMI  - Conjuctival edema   - Icterus   - Thrush   - ETT  - NGT/OGT  Neck:         + FROM    + JVD     - Nodes     - Masses    + Mid-line trachea   - Tracheostomy  Chest:         - Sternal click  - Sternal drainage - Pacing wires  - Chest tubes  - SubQ emphysema  Lungs:          + CTA   - Rhonchi    - Rales    - Wheezing     - Decreased BS   - Dullness R L  Cardiac:       + S1 + S2    - RRR   + Irregular   - S3  - S4    - Murmurs   - Rub   - Hamman’s sign   Abdomen:    + BS     + Soft    + Non-tender     - Distended    - Organomegaly  - PEG  Extremities:   - Cyanosis U/L   - Clubbing  U/L  - LE/UE Edema   + Capillary refill    + Pulses   Neuro:        + Awake   +  Alert   - Confused   - Lethargic   - Sedated   - Generalized Weakness  Skin:        - Rashes    - Erythema   - Normal incisions   + IV sites intact  - Sacral decubitus    MEDICATIONS  (STANDING):  aspirin  chewable 81 milliGRAM(s) Oral daily  chlorhexidine 2% Cloths 1 Application(s) Topical daily  cholestyramine Powder (Sugar-Free) 4 Gram(s) Oral two times a day  heparin  Infusion. 1500 Unit(s)/Hr (15 mL/Hr) IV Continuous <Continuous>  lactated ringers. 1000 milliLiter(s) (75 mL/Hr) IV Continuous <Continuous>  loperamide 4 milliGRAM(s) Oral every 6 hours  meropenem Injectable 1000 milliGRAM(s) IV Push every 12 hours  metoprolol tartrate 50 milliGRAM(s) Oral every 6 hours  midodrine 10 milliGRAM(s) Oral every 8 hours  tamsulosin 0.4 milliGRAM(s) Oral at bedtime    MEDICATIONS  (PRN):  heparin   Injectable 4700 Unit(s) IV Push every 6 hours PRN For aPTT less than 40      LABS: All Lab data reviewed and analyzed                          10.2   9.56  )-----------( 323      ( 06 Dec 2022 02:30 )             30.6   12-06    137  |  102  |  23.4<H>  ----------------------------<  80  4.6   |  25.0  |  1.96<H>    Ca    8.8      06 Dec 2022 02:30  Mg     2.1     12-06    PT/INR - ( 06 Dec 2022 04:45 )   PT: 13.1 sec;   INR: 1.13 ratio         PTT - ( 06 Dec 2022 04:45 )  PTT:88.8 sec  RADIOLOGY: - Reviewed and analyzed     Assessment: Endocardiits    Plan:    IV antibiotics continued for endocarditis  Rapid atrial fibrillation now rate controlled on increased enteral Lopressor dosing, stopped the Digoxin due to post IV contrast RADHA despite IV volume expansion.  Calling Renal consult to evaluate and follow   Continued mobilization as tolerated  Lovenox continued for VTE prophylaxis in addition to Venodyne boots, may eed to modify the Lovenox dosing for RADHA  Protonix maintained for GI bleeding prophylaxis  Patient will require a CT angiogram of the head preoperatively to evaluate for mycotic aneurysms as per Neuro, will re-discuss since patient has RADHA post IV contrast load yesterday  Patient is pre-op for surgery on Thursday with normal cardia cath yesterday    Continued Midodrine to prevent-actively treat hypotension hypotension

## 2023-12-04 LAB
ANION GAP SERPL CALCULATED.3IONS-SCNC: 9 MMOL/L (ref 9–17)
BUN SERPL-MCNC: 10 MG/DL (ref 6–20)
CALCIUM SERPL-MCNC: 8.2 MG/DL (ref 8.6–10.4)
CHLORIDE SERPL-SCNC: 101 MMOL/L (ref 98–107)
CO2 SERPL-SCNC: 24 MMOL/L (ref 20–31)
CREAT SERPL-MCNC: 0.3 MG/DL (ref 0.5–0.9)
CRP SERPL HS-MCNC: 212.8 MG/L (ref 0–5)
ERYTHROCYTE [DISTWIDTH] IN BLOOD BY AUTOMATED COUNT: 15.9 % (ref 11.8–14.4)
GFR SERPL CREATININE-BSD FRML MDRD: >60 ML/MIN/1.73M2
GLUCOSE BLD-MCNC: 122 MG/DL (ref 65–105)
GLUCOSE BLD-MCNC: 124 MG/DL (ref 65–105)
GLUCOSE BLD-MCNC: 140 MG/DL (ref 65–105)
GLUCOSE BLD-MCNC: 192 MG/DL (ref 65–105)
GLUCOSE SERPL-MCNC: 138 MG/DL (ref 70–99)
HCT VFR BLD AUTO: 23.9 % (ref 36.3–47.1)
HCT VFR BLD AUTO: 30 % (ref 36.3–47.1)
HGB BLD-MCNC: 6.9 G/DL (ref 11.9–15.1)
HGB BLD-MCNC: 8.3 G/DL (ref 11.9–15.1)
MCH RBC QN AUTO: 28.8 PG (ref 25.2–33.5)
MCHC RBC AUTO-ENTMCNC: 28.9 G/DL (ref 28.4–34.8)
MCV RBC AUTO: 99.6 FL (ref 82.6–102.9)
NRBC BLD-RTO: 0 PER 100 WBC
PLATELET # BLD AUTO: 240 K/UL (ref 138–453)
PMV BLD AUTO: 12.4 FL (ref 8.1–13.5)
POTASSIUM SERPL-SCNC: 3.6 MMOL/L (ref 3.7–5.3)
RBC # BLD AUTO: 2.4 M/UL (ref 3.95–5.11)
SODIUM SERPL-SCNC: 134 MMOL/L (ref 135–144)
WBC OTHER # BLD: 7.7 K/UL (ref 3.5–11.3)

## 2023-12-04 PROCEDURE — 86900 BLOOD TYPING SEROLOGIC ABO: CPT

## 2023-12-04 PROCEDURE — 36430 TRANSFUSION BLD/BLD COMPNT: CPT

## 2023-12-04 PROCEDURE — 86850 RBC ANTIBODY SCREEN: CPT

## 2023-12-04 PROCEDURE — 99232 SBSQ HOSP IP/OBS MODERATE 35: CPT | Performed by: INTERNAL MEDICINE

## 2023-12-04 PROCEDURE — 6370000000 HC RX 637 (ALT 250 FOR IP)

## 2023-12-04 PROCEDURE — 36415 COLL VENOUS BLD VENIPUNCTURE: CPT

## 2023-12-04 PROCEDURE — 85027 COMPLETE CBC AUTOMATED: CPT

## 2023-12-04 PROCEDURE — 85014 HEMATOCRIT: CPT

## 2023-12-04 PROCEDURE — 86920 COMPATIBILITY TEST SPIN: CPT

## 2023-12-04 PROCEDURE — P9016 RBC LEUKOCYTES REDUCED: HCPCS

## 2023-12-04 PROCEDURE — 86901 BLOOD TYPING SEROLOGIC RH(D): CPT

## 2023-12-04 PROCEDURE — 82947 ASSAY GLUCOSE BLOOD QUANT: CPT

## 2023-12-04 PROCEDURE — 2580000003 HC RX 258: Performed by: INTERNAL MEDICINE

## 2023-12-04 PROCEDURE — 86140 C-REACTIVE PROTEIN: CPT

## 2023-12-04 PROCEDURE — 6370000000 HC RX 637 (ALT 250 FOR IP): Performed by: INTERNAL MEDICINE

## 2023-12-04 PROCEDURE — 80048 BASIC METABOLIC PNL TOTAL CA: CPT

## 2023-12-04 PROCEDURE — 2580000003 HC RX 258

## 2023-12-04 PROCEDURE — 85018 HEMOGLOBIN: CPT

## 2023-12-04 PROCEDURE — 6360000002 HC RX W HCPCS: Performed by: HOSPITALIST

## 2023-12-04 PROCEDURE — 1200000000 HC SEMI PRIVATE

## 2023-12-04 PROCEDURE — 6360000002 HC RX W HCPCS: Performed by: INTERNAL MEDICINE

## 2023-12-04 RX ORDER — SODIUM CHLORIDE 9 MG/ML
INJECTION, SOLUTION INTRAVENOUS PRN
Status: DISCONTINUED | OUTPATIENT
Start: 2023-12-04 | End: 2023-12-10 | Stop reason: HOSPADM

## 2023-12-04 RX ADMIN — Medication 2000 MG: at 00:06

## 2023-12-04 RX ADMIN — OXYCODONE 10 MG: 5 TABLET ORAL at 10:38

## 2023-12-04 RX ADMIN — PRAVASTATIN SODIUM 40 MG: 20 TABLET ORAL at 00:05

## 2023-12-04 RX ADMIN — HYDROMORPHONE HYDROCHLORIDE 0.5 MG: 1 INJECTION, SOLUTION INTRAMUSCULAR; INTRAVENOUS; SUBCUTANEOUS at 19:51

## 2023-12-04 RX ADMIN — LOSARTAN POTASSIUM 50 MG: 50 TABLET ORAL at 09:49

## 2023-12-04 RX ADMIN — LORAZEPAM 0.5 MG: 0.5 TABLET ORAL at 02:56

## 2023-12-04 RX ADMIN — INSULIN GLARGINE 24 UNITS: 100 INJECTION, SOLUTION SUBCUTANEOUS at 09:48

## 2023-12-04 RX ADMIN — LORAZEPAM 0.5 MG: 0.5 TABLET ORAL at 14:25

## 2023-12-04 RX ADMIN — METOPROLOL TARTRATE 25 MG: 25 TABLET, FILM COATED ORAL at 09:49

## 2023-12-04 RX ADMIN — SODIUM CHLORIDE, PRESERVATIVE FREE 10 ML: 5 INJECTION INTRAVENOUS at 19:52

## 2023-12-04 RX ADMIN — LORAZEPAM 2 MG: 2 INJECTION INTRAMUSCULAR; INTRAVENOUS at 21:06

## 2023-12-04 RX ADMIN — HYDROMORPHONE HYDROCHLORIDE 0.5 MG: 1 INJECTION, SOLUTION INTRAMUSCULAR; INTRAVENOUS; SUBCUTANEOUS at 06:02

## 2023-12-04 RX ADMIN — HYDROMORPHONE HYDROCHLORIDE 0.5 MG: 1 INJECTION, SOLUTION INTRAMUSCULAR; INTRAVENOUS; SUBCUTANEOUS at 15:37

## 2023-12-04 RX ADMIN — Medication 2000 MG: at 12:39

## 2023-12-04 RX ADMIN — OXYCODONE 10 MG: 5 TABLET ORAL at 01:18

## 2023-12-04 RX ADMIN — OXYCODONE 10 MG: 5 TABLET ORAL at 18:29

## 2023-12-04 RX ADMIN — ACETAMINOPHEN 1000 MG: 500 TABLET ORAL at 00:05

## 2023-12-04 RX ADMIN — SODIUM CHLORIDE, PRESERVATIVE FREE 10 ML: 5 INJECTION INTRAVENOUS at 09:25

## 2023-12-04 RX ADMIN — HYDROMORPHONE HYDROCHLORIDE 0.5 MG: 1 INJECTION, SOLUTION INTRAMUSCULAR; INTRAVENOUS; SUBCUTANEOUS at 02:49

## 2023-12-04 RX ADMIN — OXYCODONE 10 MG: 5 TABLET ORAL at 14:24

## 2023-12-04 RX ADMIN — PRAVASTATIN SODIUM 40 MG: 20 TABLET ORAL at 21:06

## 2023-12-04 RX ADMIN — INSULIN GLARGINE 10 UNITS: 100 INJECTION, SOLUTION SUBCUTANEOUS at 21:06

## 2023-12-04 RX ADMIN — HYDROMORPHONE HYDROCHLORIDE 0.5 MG: 1 INJECTION, SOLUTION INTRAMUSCULAR; INTRAVENOUS; SUBCUTANEOUS at 00:05

## 2023-12-04 RX ADMIN — OXYCODONE 10 MG: 5 TABLET ORAL at 22:41

## 2023-12-04 RX ADMIN — FLUOXETINE HYDROCHLORIDE 40 MG: 20 CAPSULE ORAL at 09:49

## 2023-12-04 RX ADMIN — ACETAMINOPHEN 1000 MG: 500 TABLET ORAL at 18:30

## 2023-12-04 RX ADMIN — HYDROMORPHONE HYDROCHLORIDE 0.5 MG: 1 INJECTION, SOLUTION INTRAMUSCULAR; INTRAVENOUS; SUBCUTANEOUS at 12:38

## 2023-12-04 RX ADMIN — ACETAMINOPHEN 1000 MG: 500 TABLET ORAL at 06:02

## 2023-12-04 RX ADMIN — OXYCODONE 10 MG: 5 TABLET ORAL at 04:59

## 2023-12-04 RX ADMIN — HYDROMORPHONE HYDROCHLORIDE 0.5 MG: 1 INJECTION, SOLUTION INTRAMUSCULAR; INTRAVENOUS; SUBCUTANEOUS at 09:25

## 2023-12-04 RX ADMIN — BUPROPION HYDROCHLORIDE 150 MG: 150 TABLET, EXTENDED RELEASE ORAL at 09:48

## 2023-12-04 RX ADMIN — RIFAMPIN 600 MG: 300 CAPSULE ORAL at 09:48

## 2023-12-04 ASSESSMENT — PAIN DESCRIPTION - DESCRIPTORS
DESCRIPTORS: BURNING;STABBING
DESCRIPTORS: ACHING;DISCOMFORT
DESCRIPTORS: BURNING;STABBING

## 2023-12-04 ASSESSMENT — PAIN SCALES - GENERAL
PAINLEVEL_OUTOF10: 9
PAINLEVEL_OUTOF10: 7
PAINLEVEL_OUTOF10: 9
PAINLEVEL_OUTOF10: 8
PAINLEVEL_OUTOF10: 7
PAINLEVEL_OUTOF10: 7
PAINLEVEL_OUTOF10: 8
PAINLEVEL_OUTOF10: 8
PAINLEVEL_OUTOF10: 7
PAINLEVEL_OUTOF10: 8
PAINLEVEL_OUTOF10: 8
PAINLEVEL_OUTOF10: 9

## 2023-12-04 ASSESSMENT — PAIN DESCRIPTION - LOCATION
LOCATION: HIP
LOCATION: LEG;HIP

## 2023-12-04 ASSESSMENT — PAIN DESCRIPTION - PAIN TYPE: TYPE: CHRONIC PAIN;SURGICAL PAIN

## 2023-12-04 ASSESSMENT — PAIN DESCRIPTION - ORIENTATION
ORIENTATION: LEFT
ORIENTATION: LEFT

## 2023-12-04 ASSESSMENT — PAIN DESCRIPTION - ONSET: ONSET: ON-GOING

## 2023-12-04 ASSESSMENT — PAIN DESCRIPTION - FREQUENCY: FREQUENCY: CONTINUOUS

## 2023-12-04 ASSESSMENT — PAIN - FUNCTIONAL ASSESSMENT: PAIN_FUNCTIONAL_ASSESSMENT: INTOLERABLE, UNABLE TO DO ANY ACTIVE OR PASSIVE ACTIVITIES

## 2023-12-04 NOTE — PLAN OF CARE
Problem: Discharge Planning  Goal: Discharge to home or other facility with appropriate resources  12/4/2023 0045 by Bulmaro Kasper RN  Outcome: Progressing  12/4/2023 0037 by Bulmaro Kasper RN  Outcome: Progressing  12/3/2023 1502 by Mary Mcclelland RN  Outcome: Progressing  Flowsheets (Taken 12/3/2023 0800)  Discharge to home or other facility with appropriate resources: Identify barriers to discharge with patient and caregiver     Problem: Pain  Goal: Verbalizes/displays adequate comfort level or baseline comfort level  12/4/2023 0045 by Bulmaro Kasper RN  Outcome: Progressing  12/4/2023 0037 by Bulmaro Kasper RN  Outcome: Progressing  12/3/2023 1502 by Mary Mcclelland RN  Outcome: Progressing     Problem: Safety - Adult  Goal: Free from fall injury  12/4/2023 0045 by Bulmaro Kasper RN  Outcome: Progressing  12/4/2023 0037 by Bulmaro Kasper RN  Outcome: Progressing  12/3/2023 1502 by Mary Mcclelland RN  Outcome: Progressing     Problem: ABCDS Injury Assessment  Goal: Absence of physical injury  12/4/2023 0045 by Bulmaro Kasper RN  Outcome: Progressing  12/4/2023 0037 by Bulmaro Kasper RN  Outcome: Progressing  12/3/2023 1502 by Mary Mcclelland RN  Outcome: Progressing     Problem: Skin/Tissue Integrity  Goal: Absence of new skin breakdown  Description: 1. Monitor for areas of redness and/or skin breakdown  2. Assess vascular access sites hourly  3. Every 4-6 hours minimum:  Change oxygen saturation probe site  4. Every 4-6 hours:  If on nasal continuous positive airway pressure, respiratory therapy assess nares and determine need for appliance change or resting period.   12/4/2023 0045 by Bulmaro Kasper RN  Outcome: Progressing  12/4/2023 0037 by Bulmaro Kasper RN  Outcome: Progressing  12/3/2023 1502 by Mary Mcclelland RN  Outcome: Progressing     Problem: Chronic Conditions and Co-morbidities  Goal: Patient's chronic conditions and co-morbidity symptoms are monitored and maintained

## 2023-12-04 NOTE — PLAN OF CARE
Problem: Discharge Planning  Goal: Discharge to home or other facility with appropriate resources  12/4/2023 0037 by Bulmaro Kasper RN  Outcome: Progressing  12/3/2023 1502 by Mary Mcclelland RN  Outcome: Progressing  Flowsheets (Taken 12/3/2023 0800)  Discharge to home or other facility with appropriate resources: Identify barriers to discharge with patient and caregiver     Problem: Pain  Goal: Verbalizes/displays adequate comfort level or baseline comfort level  12/4/2023 0037 by Bulmaro Kasper RN  Outcome: Progressing  12/3/2023 1502 by Mary Mcclelland RN  Outcome: Progressing     Problem: Safety - Adult  Goal: Free from fall injury  12/4/2023 0037 by Bulmaro Kasper RN  Outcome: Progressing  12/3/2023 1502 by Mary Mcclelland RN  Outcome: Progressing     Problem: ABCDS Injury Assessment  Goal: Absence of physical injury  12/4/2023 0037 by Bulmaro Kasper RN  Outcome: Progressing  12/3/2023 1502 by Mary Mcclelland RN  Outcome: Progressing     Problem: Skin/Tissue Integrity  Goal: Absence of new skin breakdown  Description: 1. Monitor for areas of redness and/or skin breakdown  2. Assess vascular access sites hourly  3. Every 4-6 hours minimum:  Change oxygen saturation probe site  4. Every 4-6 hours:  If on nasal continuous positive airway pressure, respiratory therapy assess nares and determine need for appliance change or resting period.   12/4/2023 0037 by Bulmaro Kasper RN  Outcome: Progressing  12/3/2023 1502 by Mary Mcclelland RN  Outcome: Progressing     Problem: Chronic Conditions and Co-morbidities  Goal: Patient's chronic conditions and co-morbidity symptoms are monitored and maintained or improved  12/4/2023 0037 by Bulmaro Kasper RN  Outcome: Progressing  12/3/2023 1502 by Mary Mcclelland RN  Outcome: Progressing  Flowsheets (Taken 12/3/2023 0800)  Care Plan - Patient's Chronic Conditions and Co-Morbidity Symptoms are Monitored and Maintained or Improved: Collaborate with

## 2023-12-04 NOTE — CARE COORDINATION
Pt w/ transitional Planning and plan of d/c to [facility] with a update that Doc is trying to wean down pain med frequency. Possible discharge next 24/48 hrs. Emilee sadler bed is ready when she is.

## 2023-12-05 LAB
ABO/RH: NORMAL
ANTIBODY SCREEN: NEGATIVE
ARM BAND NUMBER: NORMAL
BLOOD BANK BLOOD PRODUCT EXPIRATION DATE: NORMAL
BLOOD BANK DISPENSE STATUS: NORMAL
BLOOD BANK ISBT PRODUCT BLOOD TYPE: 6200
BLOOD BANK PRODUCT CODE: NORMAL
BLOOD BANK SAMPLE EXPIRATION: NORMAL
BLOOD BANK UNIT TYPE AND RH: NORMAL
BPU ID: NORMAL
COMPONENT: NORMAL
CROSSMATCH RESULT: NORMAL
GLUCOSE BLD-MCNC: 100 MG/DL (ref 65–105)
GLUCOSE BLD-MCNC: 117 MG/DL (ref 65–105)
GLUCOSE BLD-MCNC: 147 MG/DL (ref 65–105)
TRANSFUSION STATUS: NORMAL
UNIT DIVISION: 0
UNIT ISSUE DATE/TIME: NORMAL

## 2023-12-05 PROCEDURE — 6370000000 HC RX 637 (ALT 250 FOR IP)

## 2023-12-05 PROCEDURE — 99233 SBSQ HOSP IP/OBS HIGH 50: CPT | Performed by: INTERNAL MEDICINE

## 2023-12-05 PROCEDURE — 6360000002 HC RX W HCPCS: Performed by: INTERNAL MEDICINE

## 2023-12-05 PROCEDURE — 99232 SBSQ HOSP IP/OBS MODERATE 35: CPT | Performed by: INTERNAL MEDICINE

## 2023-12-05 PROCEDURE — 1200000000 HC SEMI PRIVATE

## 2023-12-05 PROCEDURE — 6360000002 HC RX W HCPCS: Performed by: HOSPITALIST

## 2023-12-05 PROCEDURE — 82947 ASSAY GLUCOSE BLOOD QUANT: CPT

## 2023-12-05 PROCEDURE — 6370000000 HC RX 637 (ALT 250 FOR IP): Performed by: INTERNAL MEDICINE

## 2023-12-05 PROCEDURE — 2580000003 HC RX 258

## 2023-12-05 RX ORDER — FONDAPARINUX SODIUM 5 MG/.4ML
10 INJECTION SUBCUTANEOUS DAILY
Status: DISCONTINUED | OUTPATIENT
Start: 2023-12-06 | End: 2023-12-10 | Stop reason: HOSPADM

## 2023-12-05 RX ADMIN — LORAZEPAM 2 MG: 2 INJECTION INTRAMUSCULAR; INTRAVENOUS at 22:02

## 2023-12-05 RX ADMIN — OXYCODONE HYDROCHLORIDE 10 MG: 5 TABLET ORAL at 20:16

## 2023-12-05 RX ADMIN — OXYCODONE 10 MG: 5 TABLET ORAL at 07:48

## 2023-12-05 RX ADMIN — HYDROMORPHONE HYDROCHLORIDE 0.5 MG: 1 INJECTION, SOLUTION INTRAMUSCULAR; INTRAVENOUS; SUBCUTANEOUS at 00:15

## 2023-12-05 RX ADMIN — HYDROMORPHONE HYDROCHLORIDE 0.5 MG: 1 INJECTION, SOLUTION INTRAMUSCULAR; INTRAVENOUS; SUBCUTANEOUS at 17:49

## 2023-12-05 RX ADMIN — INSULIN GLARGINE 24 UNITS: 100 INJECTION, SOLUTION SUBCUTANEOUS at 07:52

## 2023-12-05 RX ADMIN — HYDROMORPHONE HYDROCHLORIDE 0.5 MG: 1 INJECTION, SOLUTION INTRAMUSCULAR; INTRAVENOUS; SUBCUTANEOUS at 14:16

## 2023-12-05 RX ADMIN — INSULIN GLARGINE 10 UNITS: 100 INJECTION, SOLUTION SUBCUTANEOUS at 22:01

## 2023-12-05 RX ADMIN — LORAZEPAM 0.5 MG: 0.5 TABLET ORAL at 12:05

## 2023-12-05 RX ADMIN — METOPROLOL TARTRATE 25 MG: 25 TABLET, FILM COATED ORAL at 07:48

## 2023-12-05 RX ADMIN — SODIUM CHLORIDE, PRESERVATIVE FREE 10 ML: 5 INJECTION INTRAVENOUS at 22:02

## 2023-12-05 RX ADMIN — ACETAMINOPHEN 1000 MG: 500 TABLET ORAL at 07:48

## 2023-12-05 RX ADMIN — ACETAMINOPHEN 1000 MG: 500 TABLET ORAL at 00:15

## 2023-12-05 RX ADMIN — RIFAMPIN 600 MG: 300 CAPSULE ORAL at 07:51

## 2023-12-05 RX ADMIN — HYDROMORPHONE HYDROCHLORIDE 0.5 MG: 1 INJECTION, SOLUTION INTRAMUSCULAR; INTRAVENOUS; SUBCUTANEOUS at 10:41

## 2023-12-05 RX ADMIN — BUPROPION HYDROCHLORIDE 150 MG: 150 TABLET, EXTENDED RELEASE ORAL at 07:52

## 2023-12-05 RX ADMIN — LOSARTAN POTASSIUM 50 MG: 50 TABLET ORAL at 07:48

## 2023-12-05 RX ADMIN — ACETAMINOPHEN 1000 MG: 500 TABLET ORAL at 16:00

## 2023-12-05 RX ADMIN — OXYCODONE 10 MG: 5 TABLET ORAL at 02:40

## 2023-12-05 RX ADMIN — FLUOXETINE HYDROCHLORIDE 40 MG: 20 CAPSULE ORAL at 07:51

## 2023-12-05 RX ADMIN — SODIUM CHLORIDE, PRESERVATIVE FREE 10 ML: 5 INJECTION INTRAVENOUS at 07:54

## 2023-12-05 RX ADMIN — ACETAMINOPHEN 1000 MG: 500 TABLET ORAL at 12:01

## 2023-12-05 RX ADMIN — Medication 2000 MG: at 00:26

## 2023-12-05 RX ADMIN — OXYCODONE 10 MG: 5 TABLET ORAL at 12:01

## 2023-12-05 RX ADMIN — Medication 2000 MG: at 12:02

## 2023-12-05 RX ADMIN — OXYCODONE 10 MG: 5 TABLET ORAL at 16:01

## 2023-12-05 RX ADMIN — PRAVASTATIN SODIUM 40 MG: 20 TABLET ORAL at 20:00

## 2023-12-05 RX ADMIN — HYDROMORPHONE HYDROCHLORIDE 0.5 MG: 1 INJECTION, SOLUTION INTRAMUSCULAR; INTRAVENOUS; SUBCUTANEOUS at 22:02

## 2023-12-05 ASSESSMENT — PAIN SCALES - GENERAL
PAINLEVEL_OUTOF10: 9
PAINLEVEL_OUTOF10: 7
PAINLEVEL_OUTOF10: 8
PAINLEVEL_OUTOF10: 8
PAINLEVEL_OUTOF10: 9
PAINLEVEL_OUTOF10: 8
PAINLEVEL_OUTOF10: 9
PAINLEVEL_OUTOF10: 8

## 2023-12-05 NOTE — PLAN OF CARE
Problem: Discharge Planning  Goal: Discharge to home or other facility with appropriate resources  12/5/2023 0915 by David Gilliam  Outcome: Progressing  Flowsheets (Taken 12/5/2023 0801)  Discharge to home or other facility with appropriate resources: Identify barriers to discharge with patient and caregiver  12/4/2023 1957 by Rosalind Cuevas RN  Outcome: Progressing     Problem: Pain  Goal: Verbalizes/displays adequate comfort level or baseline comfort level  12/5/2023 0915 by David Gilliam  Outcome: Progressing  Flowsheets (Taken 12/5/2023 0748 by Temo Dc RN)  Verbalizes/displays adequate comfort level or baseline comfort level: Encourage patient to monitor pain and request assistance  12/4/2023 1957 by Rosalind Cuevas RN  Outcome: Progressing     Problem: Safety - Adult  Goal: Free from fall injury  12/5/2023 0915 by David Gilliam  Outcome: Progressing  Flowsheets (Taken 12/5/2023 0912)  Free From Fall Injury: Instruct family/caregiver on patient safety  12/4/2023 1957 by Rosalind Cuevas RN  Outcome: Progressing     Problem: ABCDS Injury Assessment  Goal: Absence of physical injury  12/5/2023 0915 by David Gilliam  Outcome: Progressing  Flowsheets (Taken 12/5/2023 0912)  Absence of Physical Injury: Implement safety measures based on patient assessment  12/4/2023 1957 by Rosalind Cuevas RN  Outcome: Progressing     Problem: Skin/Tissue Integrity  Goal: Absence of new skin breakdown  Description: 1. Monitor for areas of redness and/or skin breakdown  2. Assess vascular access sites hourly  3. Every 4-6 hours minimum:  Change oxygen saturation probe site  4. Every 4-6 hours:  If on nasal continuous positive airway pressure, respiratory therapy assess nares and determine need for appliance change or resting period.   12/5/2023 0915 by David Gilliam  Outcome: Progressing  12/4/2023 1957 by Rosalind Cuevas RN  Outcome: Progressing     Problem: Chronic Conditions and Co-morbidities  Goal:

## 2023-12-06 LAB
ANION GAP SERPL CALCULATED.3IONS-SCNC: 7 MMOL/L (ref 9–17)
BASOPHILS # BLD: 0.05 K/UL (ref 0–0.2)
BASOPHILS NFR BLD: 1 % (ref 0–2)
BUN SERPL-MCNC: 7 MG/DL (ref 6–20)
CALCIUM SERPL-MCNC: 8.3 MG/DL (ref 8.6–10.4)
CHLORIDE SERPL-SCNC: 102 MMOL/L (ref 98–107)
CO2 SERPL-SCNC: 25 MMOL/L (ref 20–31)
CREAT SERPL-MCNC: 0.3 MG/DL (ref 0.5–0.9)
CRP SERPL HS-MCNC: 167.3 MG/L (ref 0–5)
EKG ATRIAL RATE: 174 BPM
EKG Q-T INTERVAL: 240 MS
EKG QRS DURATION: 80 MS
EKG QTC CALCULATION (BAZETT): 396 MS
EKG R AXIS: 56 DEGREES
EKG T AXIS: -60 DEGREES
EKG VENTRICULAR RATE: 164 BPM
EOSINOPHIL # BLD: 0.21 K/UL (ref 0–0.44)
EOSINOPHILS RELATIVE PERCENT: 3 % (ref 1–4)
ERYTHROCYTE [DISTWIDTH] IN BLOOD BY AUTOMATED COUNT: 15.2 % (ref 11.8–14.4)
GFR SERPL CREATININE-BSD FRML MDRD: >60 ML/MIN/1.73M2
GLUCOSE BLD-MCNC: 103 MG/DL (ref 65–105)
GLUCOSE BLD-MCNC: 108 MG/DL (ref 65–105)
GLUCOSE BLD-MCNC: 109 MG/DL (ref 65–105)
GLUCOSE BLD-MCNC: 113 MG/DL (ref 65–105)
GLUCOSE BLD-MCNC: 120 MG/DL (ref 65–105)
GLUCOSE SERPL-MCNC: 115 MG/DL (ref 70–99)
HCT VFR BLD AUTO: 28.2 % (ref 36.3–47.1)
HGB BLD-MCNC: 8.2 G/DL (ref 11.9–15.1)
IMM GRANULOCYTES # BLD AUTO: 0.03 K/UL (ref 0–0.3)
IMM GRANULOCYTES NFR BLD: 0 %
LYMPHOCYTES NFR BLD: 1.07 K/UL (ref 1.1–3.7)
LYMPHOCYTES RELATIVE PERCENT: 14 % (ref 24–43)
MAGNESIUM SERPL-MCNC: 1.8 MG/DL (ref 1.6–2.6)
MCH RBC QN AUTO: 28.4 PG (ref 25.2–33.5)
MCHC RBC AUTO-ENTMCNC: 29.1 G/DL (ref 28.4–34.8)
MCV RBC AUTO: 97.6 FL (ref 82.6–102.9)
MONOCYTES NFR BLD: 0.82 K/UL (ref 0.1–1.2)
MONOCYTES NFR BLD: 11 % (ref 3–12)
NEUTROPHILS NFR BLD: 71 % (ref 36–65)
NEUTS SEG NFR BLD: 5.31 K/UL (ref 1.5–8.1)
NRBC BLD-RTO: 0 PER 100 WBC
PLATELET # BLD AUTO: 337 K/UL (ref 138–453)
PMV BLD AUTO: 10.9 FL (ref 8.1–13.5)
POTASSIUM SERPL-SCNC: 4.1 MMOL/L (ref 3.7–5.3)
RBC # BLD AUTO: 2.89 M/UL (ref 3.95–5.11)
RBC # BLD: ABNORMAL 10*6/UL
SODIUM SERPL-SCNC: 134 MMOL/L (ref 135–144)
TSH SERPL DL<=0.05 MIU/L-ACNC: 2.21 UIU/ML (ref 0.3–5)
WBC OTHER # BLD: 7.5 K/UL (ref 3.5–11.3)

## 2023-12-06 PROCEDURE — 80048 BASIC METABOLIC PNL TOTAL CA: CPT

## 2023-12-06 PROCEDURE — 99232 SBSQ HOSP IP/OBS MODERATE 35: CPT | Performed by: INTERNAL MEDICINE

## 2023-12-06 PROCEDURE — 2580000003 HC RX 258: Performed by: INTERNAL MEDICINE

## 2023-12-06 PROCEDURE — 85025 COMPLETE CBC W/AUTO DIFF WBC: CPT

## 2023-12-06 PROCEDURE — 86140 C-REACTIVE PROTEIN: CPT

## 2023-12-06 PROCEDURE — 93010 ELECTROCARDIOGRAM REPORT: CPT | Performed by: INTERNAL MEDICINE

## 2023-12-06 PROCEDURE — 6370000000 HC RX 637 (ALT 250 FOR IP)

## 2023-12-06 PROCEDURE — 83735 ASSAY OF MAGNESIUM: CPT

## 2023-12-06 PROCEDURE — 36415 COLL VENOUS BLD VENIPUNCTURE: CPT

## 2023-12-06 PROCEDURE — 84443 ASSAY THYROID STIM HORMONE: CPT

## 2023-12-06 PROCEDURE — 6360000002 HC RX W HCPCS: Performed by: INTERNAL MEDICINE

## 2023-12-06 PROCEDURE — 93005 ELECTROCARDIOGRAM TRACING: CPT | Performed by: INTERNAL MEDICINE

## 2023-12-06 PROCEDURE — 97530 THERAPEUTIC ACTIVITIES: CPT

## 2023-12-06 PROCEDURE — 6370000000 HC RX 637 (ALT 250 FOR IP): Performed by: INTERNAL MEDICINE

## 2023-12-06 PROCEDURE — 2580000003 HC RX 258

## 2023-12-06 PROCEDURE — 97535 SELF CARE MNGMENT TRAINING: CPT

## 2023-12-06 PROCEDURE — 87040 BLOOD CULTURE FOR BACTERIA: CPT

## 2023-12-06 PROCEDURE — 1200000000 HC SEMI PRIVATE

## 2023-12-06 PROCEDURE — 6360000002 HC RX W HCPCS: Performed by: HOSPITALIST

## 2023-12-06 RX ORDER — MIDODRINE HYDROCHLORIDE 5 MG/1
2.5 TABLET ORAL 2 TIMES DAILY PRN
Status: DISCONTINUED | OUTPATIENT
Start: 2023-12-06 | End: 2023-12-10 | Stop reason: HOSPADM

## 2023-12-06 RX ORDER — MAGNESIUM SULFATE IN WATER 40 MG/ML
2000 INJECTION, SOLUTION INTRAVENOUS ONCE
Status: COMPLETED | OUTPATIENT
Start: 2023-12-06 | End: 2023-12-06

## 2023-12-06 RX ORDER — SODIUM CHLORIDE 9 MG/ML
INJECTION, SOLUTION INTRAVENOUS ONCE
Status: COMPLETED | OUTPATIENT
Start: 2023-12-06 | End: 2023-12-06

## 2023-12-06 RX ORDER — CYANOCOBALAMIN 1000 UG/ML
1000 INJECTION, SOLUTION INTRAMUSCULAR; SUBCUTANEOUS ONCE
Status: COMPLETED | OUTPATIENT
Start: 2023-12-06 | End: 2023-12-06

## 2023-12-06 RX ORDER — CYCLOBENZAPRINE HCL 10 MG
10 TABLET ORAL 3 TIMES DAILY PRN
Status: DISCONTINUED | OUTPATIENT
Start: 2023-12-06 | End: 2023-12-10 | Stop reason: HOSPADM

## 2023-12-06 RX ADMIN — BUPROPION HYDROCHLORIDE 150 MG: 150 TABLET, EXTENDED RELEASE ORAL at 08:59

## 2023-12-06 RX ADMIN — INSULIN GLARGINE 10 UNITS: 100 INJECTION, SOLUTION SUBCUTANEOUS at 21:56

## 2023-12-06 RX ADMIN — RIFAMPIN 600 MG: 300 CAPSULE ORAL at 08:59

## 2023-12-06 RX ADMIN — DILTIAZEM HYDROCHLORIDE 30 MG: 30 TABLET, FILM COATED ORAL at 17:44

## 2023-12-06 RX ADMIN — Medication 2000 MG: at 00:00

## 2023-12-06 RX ADMIN — MAGNESIUM SULFATE HEPTAHYDRATE 2000 MG: 40 INJECTION, SOLUTION INTRAVENOUS at 10:56

## 2023-12-06 RX ADMIN — ACETAMINOPHEN 1000 MG: 500 TABLET ORAL at 23:55

## 2023-12-06 RX ADMIN — SODIUM CHLORIDE 50 MG: 9 INJECTION, SOLUTION INTRAVENOUS at 13:53

## 2023-12-06 RX ADMIN — OXYCODONE 10 MG: 5 TABLET ORAL at 13:39

## 2023-12-06 RX ADMIN — PRAVASTATIN SODIUM 40 MG: 20 TABLET ORAL at 21:56

## 2023-12-06 RX ADMIN — CYCLOBENZAPRINE 10 MG: 10 TABLET, FILM COATED ORAL at 10:52

## 2023-12-06 RX ADMIN — HYDROMORPHONE HYDROCHLORIDE 1 MG: 1 INJECTION, SOLUTION INTRAMUSCULAR; INTRAVENOUS; SUBCUTANEOUS at 21:55

## 2023-12-06 RX ADMIN — LORAZEPAM 2 MG: 2 INJECTION INTRAMUSCULAR; INTRAVENOUS at 21:55

## 2023-12-06 RX ADMIN — SODIUM CHLORIDE: 9 INJECTION, SOLUTION INTRAVENOUS at 11:07

## 2023-12-06 RX ADMIN — FLUOXETINE HYDROCHLORIDE 40 MG: 20 CAPSULE ORAL at 08:59

## 2023-12-06 RX ADMIN — Medication 2000 MG: at 13:44

## 2023-12-06 RX ADMIN — HYDROMORPHONE HYDROCHLORIDE 1 MG: 1 INJECTION, SOLUTION INTRAMUSCULAR; INTRAVENOUS; SUBCUTANEOUS at 19:02

## 2023-12-06 RX ADMIN — METOPROLOL TARTRATE 25 MG: 25 TABLET, FILM COATED ORAL at 08:59

## 2023-12-06 RX ADMIN — OXYCODONE 10 MG: 5 TABLET ORAL at 23:55

## 2023-12-06 RX ADMIN — OXYCODONE 10 MG: 5 TABLET ORAL at 17:48

## 2023-12-06 RX ADMIN — DILTIAZEM HYDROCHLORIDE 30 MG: 30 TABLET, FILM COATED ORAL at 23:56

## 2023-12-06 RX ADMIN — SODIUM CHLORIDE, PRESERVATIVE FREE 10 ML: 5 INJECTION INTRAVENOUS at 08:59

## 2023-12-06 RX ADMIN — ACETAMINOPHEN 1000 MG: 500 TABLET ORAL at 17:44

## 2023-12-06 RX ADMIN — HYDROMORPHONE HYDROCHLORIDE 0.5 MG: 1 INJECTION, SOLUTION INTRAMUSCULAR; INTRAVENOUS; SUBCUTANEOUS at 06:43

## 2023-12-06 RX ADMIN — OXYCODONE 10 MG: 5 TABLET ORAL at 00:00

## 2023-12-06 RX ADMIN — HYDROMORPHONE HYDROCHLORIDE 1 MG: 1 INJECTION, SOLUTION INTRAMUSCULAR; INTRAVENOUS; SUBCUTANEOUS at 14:59

## 2023-12-06 RX ADMIN — SODIUM CHLORIDE, PRESERVATIVE FREE 10 ML: 5 INJECTION INTRAVENOUS at 21:56

## 2023-12-06 RX ADMIN — CYCLOBENZAPRINE 10 MG: 10 TABLET, FILM COATED ORAL at 17:43

## 2023-12-06 RX ADMIN — OXYCODONE 10 MG: 5 TABLET ORAL at 08:59

## 2023-12-06 RX ADMIN — ACETAMINOPHEN 1000 MG: 500 TABLET ORAL at 10:52

## 2023-12-06 RX ADMIN — HYDROMORPHONE HYDROCHLORIDE 0.5 MG: 1 INJECTION, SOLUTION INTRAMUSCULAR; INTRAVENOUS; SUBCUTANEOUS at 02:30

## 2023-12-06 RX ADMIN — FONDAPARINUX SODIUM 10 MG: 5 INJECTION, SOLUTION SUBCUTANEOUS at 08:59

## 2023-12-06 RX ADMIN — Medication 2000 MG: at 23:56

## 2023-12-06 RX ADMIN — CYANOCOBALAMIN 1000 MCG: 1000 INJECTION, SOLUTION INTRAMUSCULAR; SUBCUTANEOUS at 12:30

## 2023-12-06 RX ADMIN — DILTIAZEM HYDROCHLORIDE 30 MG: 30 TABLET, FILM COATED ORAL at 12:29

## 2023-12-06 RX ADMIN — HYDROMORPHONE HYDROCHLORIDE 1 MG: 1 INJECTION, SOLUTION INTRAMUSCULAR; INTRAVENOUS; SUBCUTANEOUS at 10:53

## 2023-12-06 RX ADMIN — LORAZEPAM 0.5 MG: 0.5 TABLET ORAL at 09:13

## 2023-12-06 RX ADMIN — LOSARTAN POTASSIUM 50 MG: 50 TABLET ORAL at 08:59

## 2023-12-06 RX ADMIN — OXYCODONE 10 MG: 5 TABLET ORAL at 04:46

## 2023-12-06 RX ADMIN — ACETAMINOPHEN 1000 MG: 500 TABLET ORAL at 06:43

## 2023-12-06 RX ADMIN — INSULIN GLARGINE 24 UNITS: 100 INJECTION, SOLUTION SUBCUTANEOUS at 08:58

## 2023-12-06 ASSESSMENT — PAIN SCALES - GENERAL
PAINLEVEL_OUTOF10: 8
PAINLEVEL_OUTOF10: 8
PAINLEVEL_OUTOF10: 7
PAINLEVEL_OUTOF10: 9
PAINLEVEL_OUTOF10: 7
PAINLEVEL_OUTOF10: 9

## 2023-12-06 ASSESSMENT — PAIN DESCRIPTION - ONSET: ONSET: ON-GOING

## 2023-12-06 ASSESSMENT — PAIN DESCRIPTION - PAIN TYPE: TYPE: CHRONIC PAIN;ACUTE PAIN

## 2023-12-06 ASSESSMENT — PAIN DESCRIPTION - FREQUENCY: FREQUENCY: CONTINUOUS

## 2023-12-06 ASSESSMENT — PAIN DESCRIPTION - LOCATION: LOCATION: LEG;HIP

## 2023-12-06 ASSESSMENT — PAIN DESCRIPTION - ORIENTATION: ORIENTATION: LEFT

## 2023-12-06 ASSESSMENT — PAIN - FUNCTIONAL ASSESSMENT: PAIN_FUNCTIONAL_ASSESSMENT: INTOLERABLE, UNABLE TO DO ANY ACTIVE OR PASSIVE ACTIVITIES

## 2023-12-06 ASSESSMENT — PAIN DESCRIPTION - DESCRIPTORS: DESCRIPTORS: ACHING;BURNING

## 2023-12-06 NOTE — CARE COORDINATION
Pt with transitional planning & called Emilee at [address] [facility] w/ bed still available. Made Emilee aware that pt. Still with IV pain meds & plan to switch to oral. Will update as soon as possible or any changes.

## 2023-12-06 NOTE — CONSULTS
Nutrition Note    Consulted for increased calories for wound healing, ONS. Dietitian is already following patient Patient is receiving Diabetic ONS BID and wound healing ONS QD. Patient consumes 51-75% of most meals and ONS.      Electronically signed by Edmond Houser RD on 12/6/23 at 10:27 AM EST    Contact: [phone]

## 2023-12-07 LAB
EKG ATRIAL RATE: 74 BPM
EKG P AXIS: 19 DEGREES
EKG P-R INTERVAL: 118 MS
EKG Q-T INTERVAL: 410 MS
EKG QRS DURATION: 88 MS
EKG QTC CALCULATION (BAZETT): 455 MS
EKG R AXIS: 57 DEGREES
EKG T AXIS: 37 DEGREES
EKG VENTRICULAR RATE: 74 BPM
GLUCOSE BLD-MCNC: 108 MG/DL (ref 65–105)
GLUCOSE BLD-MCNC: 118 MG/DL (ref 65–105)
GLUCOSE BLD-MCNC: 186 MG/DL (ref 65–105)
GLUCOSE BLD-MCNC: 200 MG/DL (ref 65–105)

## 2023-12-07 PROCEDURE — 1200000000 HC SEMI PRIVATE

## 2023-12-07 PROCEDURE — 93010 ELECTROCARDIOGRAM REPORT: CPT | Performed by: INTERNAL MEDICINE

## 2023-12-07 PROCEDURE — 6370000000 HC RX 637 (ALT 250 FOR IP): Performed by: INTERNAL MEDICINE

## 2023-12-07 PROCEDURE — 6370000000 HC RX 637 (ALT 250 FOR IP)

## 2023-12-07 PROCEDURE — 99232 SBSQ HOSP IP/OBS MODERATE 35: CPT | Performed by: INTERNAL MEDICINE

## 2023-12-07 PROCEDURE — 99231 SBSQ HOSP IP/OBS SF/LOW 25: CPT | Performed by: INTERNAL MEDICINE

## 2023-12-07 PROCEDURE — 82947 ASSAY GLUCOSE BLOOD QUANT: CPT

## 2023-12-07 PROCEDURE — 6360000002 HC RX W HCPCS: Performed by: INTERNAL MEDICINE

## 2023-12-07 PROCEDURE — 6360000002 HC RX W HCPCS: Performed by: HOSPITALIST

## 2023-12-07 PROCEDURE — 2580000003 HC RX 258

## 2023-12-07 RX ADMIN — DILTIAZEM HYDROCHLORIDE 30 MG: 30 TABLET, FILM COATED ORAL at 06:12

## 2023-12-07 RX ADMIN — ACETAMINOPHEN 1000 MG: 500 TABLET ORAL at 06:12

## 2023-12-07 RX ADMIN — LORAZEPAM 0.5 MG: 0.5 TABLET ORAL at 11:46

## 2023-12-07 RX ADMIN — HYDROMORPHONE HYDROCHLORIDE 1 MG: 1 INJECTION, SOLUTION INTRAMUSCULAR; INTRAVENOUS; SUBCUTANEOUS at 08:19

## 2023-12-07 RX ADMIN — CYCLOBENZAPRINE 10 MG: 10 TABLET, FILM COATED ORAL at 20:14

## 2023-12-07 RX ADMIN — ACETAMINOPHEN 1000 MG: 500 TABLET ORAL at 11:46

## 2023-12-07 RX ADMIN — CYCLOBENZAPRINE 10 MG: 10 TABLET, FILM COATED ORAL at 14:31

## 2023-12-07 RX ADMIN — SODIUM CHLORIDE, PRESERVATIVE FREE 10 ML: 5 INJECTION INTRAVENOUS at 20:17

## 2023-12-07 RX ADMIN — OXYCODONE 10 MG: 5 TABLET ORAL at 21:48

## 2023-12-07 RX ADMIN — RIFAMPIN 600 MG: 300 CAPSULE ORAL at 08:25

## 2023-12-07 RX ADMIN — DILTIAZEM HYDROCHLORIDE 30 MG: 30 TABLET, FILM COATED ORAL at 11:47

## 2023-12-07 RX ADMIN — BUPROPION HYDROCHLORIDE 150 MG: 150 TABLET, EXTENDED RELEASE ORAL at 08:26

## 2023-12-07 RX ADMIN — SODIUM CHLORIDE, PRESERVATIVE FREE 10 ML: 5 INJECTION INTRAVENOUS at 08:26

## 2023-12-07 RX ADMIN — HYDROMORPHONE HYDROCHLORIDE 1 MG: 1 INJECTION, SOLUTION INTRAMUSCULAR; INTRAVENOUS; SUBCUTANEOUS at 01:13

## 2023-12-07 RX ADMIN — OXYCODONE 10 MG: 5 TABLET ORAL at 10:19

## 2023-12-07 RX ADMIN — HYDROMORPHONE HYDROCHLORIDE 1 MG: 1 INJECTION, SOLUTION INTRAMUSCULAR; INTRAVENOUS; SUBCUTANEOUS at 14:31

## 2023-12-07 RX ADMIN — HYDROMORPHONE HYDROCHLORIDE 1 MG: 1 INJECTION, SOLUTION INTRAMUSCULAR; INTRAVENOUS; SUBCUTANEOUS at 20:44

## 2023-12-07 RX ADMIN — Medication 2000 MG: at 23:43

## 2023-12-07 RX ADMIN — METOPROLOL TARTRATE 25 MG: 25 TABLET, FILM COATED ORAL at 08:19

## 2023-12-07 RX ADMIN — ACETAMINOPHEN 1000 MG: 500 TABLET ORAL at 23:42

## 2023-12-07 RX ADMIN — INSULIN GLARGINE 10 UNITS: 100 INJECTION, SOLUTION SUBCUTANEOUS at 20:44

## 2023-12-07 RX ADMIN — INSULIN GLARGINE 24 UNITS: 100 INJECTION, SOLUTION SUBCUTANEOUS at 08:19

## 2023-12-07 RX ADMIN — ACETAMINOPHEN 1000 MG: 500 TABLET ORAL at 17:45

## 2023-12-07 RX ADMIN — OXYCODONE 10 MG: 5 TABLET ORAL at 06:12

## 2023-12-07 RX ADMIN — HYDROMORPHONE HYDROCHLORIDE 1 MG: 1 INJECTION, SOLUTION INTRAMUSCULAR; INTRAVENOUS; SUBCUTANEOUS at 04:59

## 2023-12-07 RX ADMIN — Medication 2000 MG: at 11:46

## 2023-12-07 RX ADMIN — HYDROMORPHONE HYDROCHLORIDE 1 MG: 1 INJECTION, SOLUTION INTRAMUSCULAR; INTRAVENOUS; SUBCUTANEOUS at 11:26

## 2023-12-07 RX ADMIN — HYDROMORPHONE HYDROCHLORIDE 1 MG: 1 INJECTION, SOLUTION INTRAMUSCULAR; INTRAVENOUS; SUBCUTANEOUS at 23:48

## 2023-12-07 RX ADMIN — LORAZEPAM 2 MG: 2 INJECTION INTRAMUSCULAR; INTRAVENOUS at 20:13

## 2023-12-07 RX ADMIN — FONDAPARINUX SODIUM 10 MG: 5 INJECTION, SOLUTION SUBCUTANEOUS at 08:25

## 2023-12-07 RX ADMIN — HYDROMORPHONE HYDROCHLORIDE 1 MG: 1 INJECTION, SOLUTION INTRAMUSCULAR; INTRAVENOUS; SUBCUTANEOUS at 17:45

## 2023-12-07 RX ADMIN — FLUOXETINE HYDROCHLORIDE 40 MG: 20 CAPSULE ORAL at 08:25

## 2023-12-07 RX ADMIN — DILTIAZEM HYDROCHLORIDE 30 MG: 30 TABLET, FILM COATED ORAL at 17:45

## 2023-12-07 RX ADMIN — DILTIAZEM HYDROCHLORIDE 30 MG: 30 TABLET, FILM COATED ORAL at 23:42

## 2023-12-07 RX ADMIN — PRAVASTATIN SODIUM 40 MG: 20 TABLET ORAL at 20:17

## 2023-12-07 RX ADMIN — OXYCODONE 10 MG: 5 TABLET ORAL at 16:43

## 2023-12-07 ASSESSMENT — PAIN DESCRIPTION - PAIN TYPE
TYPE: CHRONIC PAIN;ACUTE PAIN

## 2023-12-07 ASSESSMENT — PAIN DESCRIPTION - LOCATION
LOCATION: HIP
LOCATION: HIP;LEG
LOCATION: HIP;LEG
LOCATION: LEG
LOCATION: HIP;LEG
LOCATION: LEG;HIP
LOCATION: HIP;LEG
LOCATION: LEG
LOCATION: LEG;HIP

## 2023-12-07 ASSESSMENT — PAIN DESCRIPTION - ONSET
ONSET: ON-GOING
ONSET: PROGRESSIVE
ONSET: ON-GOING

## 2023-12-07 ASSESSMENT — PAIN DESCRIPTION - DESCRIPTORS
DESCRIPTORS: ACHING;DISCOMFORT
DESCRIPTORS: ACHING;DISCOMFORT
DESCRIPTORS: ACHING;BURNING
DESCRIPTORS: ACHING;BURNING
DESCRIPTORS: ACHING;DISCOMFORT
DESCRIPTORS: ACHING;BURNING

## 2023-12-07 ASSESSMENT — PAIN SCALES - GENERAL
PAINLEVEL_OUTOF10: 7
PAINLEVEL_OUTOF10: 8
PAINLEVEL_OUTOF10: 7
PAINLEVEL_OUTOF10: 9
PAINLEVEL_OUTOF10: 7
PAINLEVEL_OUTOF10: 8
PAINLEVEL_OUTOF10: 8

## 2023-12-07 ASSESSMENT — PAIN DESCRIPTION - DIRECTION: RADIATING_TOWARDS: NON RADIATING

## 2023-12-07 ASSESSMENT — PAIN DESCRIPTION - ORIENTATION
ORIENTATION: LEFT

## 2023-12-07 ASSESSMENT — PAIN - FUNCTIONAL ASSESSMENT
PAIN_FUNCTIONAL_ASSESSMENT: INTOLERABLE, UNABLE TO DO ANY ACTIVE OR PASSIVE ACTIVITIES

## 2023-12-07 ASSESSMENT — PAIN DESCRIPTION - FREQUENCY
FREQUENCY: CONTINUOUS

## 2023-12-07 NOTE — PLAN OF CARE
Problem: Discharge Planning  Goal: Discharge to home or other facility with appropriate resources  Outcome: Progressing     Problem: Pain  Goal: Verbalizes/displays adequate comfort level or baseline comfort level  Outcome: Not Progressing       Problem: Safety - Adult  Goal: Free from fall injury  Outcome: Progressing     Problem: ABCDS Injury Assessment  Goal: Absence of physical injury  Outcome: Progressing     Problem: Skin/Tissue Integrity  Goal: Absence of new skin breakdown  Description: 1. Monitor for areas of redness and/or skin breakdown  2. Assess vascular access sites hourly  3. Every 4-6 hours minimum:  Change oxygen saturation probe site  4. Every 4-6 hours:  If on nasal continuous positive airway pressure, respiratory therapy assess nares and determine need for appliance change or resting period.   Outcome: Progressing     Problem: Chronic Conditions and Co-morbidities  Goal: Patient's chronic conditions and co-morbidity symptoms are monitored and maintained or improved  Outcome: Progressing     Problem: Nutrition Deficit:  Goal: Optimize nutritional status  Outcome: Progressing

## 2023-12-07 NOTE — PLAN OF CARE
Problem: Discharge Planning  Goal: Discharge to home or other facility with appropriate resources  12/7/2023 1030 by Srikanth Lakhani RN  Outcome: Progressing  12/7/2023 0003 by Dylan Pittman RN  Outcome: Progressing     Problem: Pain  Goal: Verbalizes/displays adequate comfort level or baseline comfort level  12/7/2023 1030 by Srikanth Lakhani RN  Outcome: Progressing  12/7/2023 0003 by Dylan Pittman RN  Outcome: Not Progressing     Problem: Safety - Adult  Goal: Free from fall injury  12/7/2023 1030 by Srikanth Lakhani RN  Outcome: Progressing  12/7/2023 0003 by Dylan Pittman RN  Outcome: Progressing     Problem: ABCDS Injury Assessment  Goal: Absence of physical injury  12/7/2023 1030 by Srikanth Lakhani RN  Outcome: Progressing  12/7/2023 0003 by Dylan Pittman RN  Outcome: Progressing     Problem: Skin/Tissue Integrity  Goal: Absence of new skin breakdown  Description: 1. Monitor for areas of redness and/or skin breakdown  2. Assess vascular access sites hourly  3. Every 4-6 hours minimum:  Change oxygen saturation probe site  4. Every 4-6 hours:  If on nasal continuous positive airway pressure, respiratory therapy assess nares and determine need for appliance change or resting period.   12/7/2023 1030 by Srikanth Lakhani RN  Outcome: Progressing  12/7/2023 0003 by Dylan Pittman RN  Outcome: Progressing     Problem: Chronic Conditions and Co-morbidities  Goal: Patient's chronic conditions and co-morbidity symptoms are monitored and maintained or improved  12/7/2023 1030 by Srikanth Lakhani RN  Outcome: Progressing  12/7/2023 0003 by Dylan Pittman RN  Outcome: Progressing     Problem: Nutrition Deficit:  Goal: Optimize nutritional status  12/7/2023 1030 by Srikanth Lakhani RN  Outcome: Progressing  12/7/2023 0003 by Dylan Pittman RN  Outcome: Progressing     Problem: Pain  Goal: Verbalizes/displays adequate comfort level or baseline comfort level  12/7/2023 1030 by Svetlana Lovett

## 2023-12-08 LAB
CRP SERPL HS-MCNC: 117 MG/L (ref 0–5)
GLUCOSE BLD-MCNC: 101 MG/DL (ref 65–105)
GLUCOSE BLD-MCNC: 168 MG/DL (ref 65–105)
GLUCOSE BLD-MCNC: 82 MG/DL (ref 65–105)

## 2023-12-08 PROCEDURE — 6360000002 HC RX W HCPCS: Performed by: INTERNAL MEDICINE

## 2023-12-08 PROCEDURE — 99239 HOSP IP/OBS DSCHRG MGMT >30: CPT | Performed by: INTERNAL MEDICINE

## 2023-12-08 PROCEDURE — 86140 C-REACTIVE PROTEIN: CPT

## 2023-12-08 PROCEDURE — 99232 SBSQ HOSP IP/OBS MODERATE 35: CPT | Performed by: INTERNAL MEDICINE

## 2023-12-08 PROCEDURE — 6360000002 HC RX W HCPCS: Performed by: CHIROPRACTOR

## 2023-12-08 PROCEDURE — 82947 ASSAY GLUCOSE BLOOD QUANT: CPT

## 2023-12-08 PROCEDURE — 97530 THERAPEUTIC ACTIVITIES: CPT

## 2023-12-08 PROCEDURE — 97535 SELF CARE MNGMENT TRAINING: CPT

## 2023-12-08 PROCEDURE — 97116 GAIT TRAINING THERAPY: CPT

## 2023-12-08 PROCEDURE — 6370000000 HC RX 637 (ALT 250 FOR IP)

## 2023-12-08 PROCEDURE — 1200000000 HC SEMI PRIVATE

## 2023-12-08 PROCEDURE — 36415 COLL VENOUS BLD VENIPUNCTURE: CPT

## 2023-12-08 PROCEDURE — 6370000000 HC RX 637 (ALT 250 FOR IP): Performed by: INTERNAL MEDICINE

## 2023-12-08 PROCEDURE — 6360000002 HC RX W HCPCS: Performed by: HOSPITALIST

## 2023-12-08 PROCEDURE — 2580000003 HC RX 258

## 2023-12-08 RX ORDER — OXYCODONE HYDROCHLORIDE AND ACETAMINOPHEN 5; 325 MG/1; MG/1
2 TABLET ORAL EVERY 6 HOURS PRN
Status: DISCONTINUED | OUTPATIENT
Start: 2023-12-08 | End: 2023-12-10 | Stop reason: HOSPADM

## 2023-12-08 RX ORDER — MIDODRINE HYDROCHLORIDE 2.5 MG/1
2.5 TABLET ORAL 2 TIMES DAILY PRN
Qty: 90 TABLET | Refills: 3 | Status: SHIPPED | OUTPATIENT
Start: 2023-12-08

## 2023-12-08 RX ORDER — OXYCODONE HYDROCHLORIDE AND ACETAMINOPHEN 5; 325 MG/1; MG/1
1 TABLET ORAL EVERY 4 HOURS PRN
Status: DISCONTINUED | OUTPATIENT
Start: 2023-12-08 | End: 2023-12-10 | Stop reason: HOSPADM

## 2023-12-08 RX ORDER — INSULIN GLARGINE 100 [IU]/ML
10 INJECTION, SOLUTION SUBCUTANEOUS NIGHTLY
Qty: 10 ML | Refills: 3 | Status: SHIPPED | OUTPATIENT
Start: 2023-12-08

## 2023-12-08 RX ORDER — BUPROPION HYDROCHLORIDE 150 MG/1
150 TABLET ORAL EVERY MORNING
Qty: 30 TABLET | Refills: 3 | Status: SHIPPED | OUTPATIENT
Start: 2023-12-09

## 2023-12-08 RX ORDER — HYDROMORPHONE HYDROCHLORIDE 2 MG/1
4 TABLET ORAL 2 TIMES DAILY PRN
Qty: 60 TABLET | Refills: 0 | Status: SHIPPED | OUTPATIENT
Start: 2023-12-08 | End: 2023-12-09 | Stop reason: SDUPTHER

## 2023-12-08 RX ORDER — OXYCODONE HYDROCHLORIDE AND ACETAMINOPHEN 5; 325 MG/1; MG/1
2 TABLET ORAL EVERY 4 HOURS PRN
Qty: 360 TABLET | Refills: 0 | Status: SHIPPED | OUTPATIENT
Start: 2023-12-08 | End: 2023-12-09 | Stop reason: SDUPTHER

## 2023-12-08 RX ORDER — FENTANYL CITRATE 50 UG/ML
100 INJECTION, SOLUTION INTRAMUSCULAR; INTRAVENOUS ONCE
Status: COMPLETED | OUTPATIENT
Start: 2023-12-08 | End: 2023-12-08

## 2023-12-08 RX ADMIN — OXYCODONE AND ACETAMINOPHEN 2 TABLET: 5; 325 TABLET ORAL at 17:59

## 2023-12-08 RX ADMIN — HYDROMORPHONE HYDROCHLORIDE 1 MG: 1 INJECTION, SOLUTION INTRAMUSCULAR; INTRAVENOUS; SUBCUTANEOUS at 16:05

## 2023-12-08 RX ADMIN — Medication 2000 MG: at 11:56

## 2023-12-08 RX ADMIN — METOPROLOL TARTRATE 25 MG: 25 TABLET, FILM COATED ORAL at 08:24

## 2023-12-08 RX ADMIN — DILTIAZEM HYDROCHLORIDE 30 MG: 30 TABLET, FILM COATED ORAL at 05:56

## 2023-12-08 RX ADMIN — SODIUM CHLORIDE, PRESERVATIVE FREE 10 ML: 5 INJECTION INTRAVENOUS at 20:33

## 2023-12-08 RX ADMIN — OXYCODONE 10 MG: 5 TABLET ORAL at 04:16

## 2023-12-08 RX ADMIN — INSULIN GLARGINE 24 UNITS: 100 INJECTION, SOLUTION SUBCUTANEOUS at 08:25

## 2023-12-08 RX ADMIN — FONDAPARINUX SODIUM 10 MG: 5 INJECTION, SOLUTION SUBCUTANEOUS at 11:56

## 2023-12-08 RX ADMIN — HYDROMORPHONE HYDROCHLORIDE 1 MG: 1 INJECTION, SOLUTION INTRAMUSCULAR; INTRAVENOUS; SUBCUTANEOUS at 03:07

## 2023-12-08 RX ADMIN — OXYCODONE AND ACETAMINOPHEN 2 TABLET: 5; 325 TABLET ORAL at 11:55

## 2023-12-08 RX ADMIN — FENTANYL CITRATE 100 MCG: 50 INJECTION, SOLUTION INTRAMUSCULAR; INTRAVENOUS at 05:15

## 2023-12-08 RX ADMIN — BUPROPION HYDROCHLORIDE 150 MG: 150 TABLET, EXTENDED RELEASE ORAL at 08:24

## 2023-12-08 RX ADMIN — PRAVASTATIN SODIUM 40 MG: 20 TABLET ORAL at 20:27

## 2023-12-08 RX ADMIN — SODIUM CHLORIDE, PRESERVATIVE FREE 10 ML: 5 INJECTION INTRAVENOUS at 08:24

## 2023-12-08 RX ADMIN — RIFAMPIN 600 MG: 300 CAPSULE ORAL at 08:22

## 2023-12-08 RX ADMIN — DILTIAZEM HYDROCHLORIDE 30 MG: 30 TABLET, FILM COATED ORAL at 18:02

## 2023-12-08 RX ADMIN — OXYCODONE 10 MG: 5 TABLET ORAL at 08:23

## 2023-12-08 RX ADMIN — CYCLOBENZAPRINE 10 MG: 10 TABLET, FILM COATED ORAL at 17:59

## 2023-12-08 RX ADMIN — HYDROMORPHONE HYDROCHLORIDE 1 MG: 1 INJECTION, SOLUTION INTRAMUSCULAR; INTRAVENOUS; SUBCUTANEOUS at 13:42

## 2023-12-08 RX ADMIN — HYDROMORPHONE HYDROCHLORIDE 1 MG: 1 INJECTION, SOLUTION INTRAMUSCULAR; INTRAVENOUS; SUBCUTANEOUS at 19:25

## 2023-12-08 RX ADMIN — ACETAMINOPHEN 1000 MG: 500 TABLET ORAL at 05:55

## 2023-12-08 RX ADMIN — LORAZEPAM 2 MG: 2 INJECTION INTRAMUSCULAR; INTRAVENOUS at 20:33

## 2023-12-08 RX ADMIN — DILTIAZEM HYDROCHLORIDE 30 MG: 30 TABLET, FILM COATED ORAL at 11:55

## 2023-12-08 RX ADMIN — HYDROMORPHONE HYDROCHLORIDE 1 MG: 1 INJECTION, SOLUTION INTRAMUSCULAR; INTRAVENOUS; SUBCUTANEOUS at 05:56

## 2023-12-08 RX ADMIN — LORAZEPAM 0.5 MG: 0.5 TABLET ORAL at 08:22

## 2023-12-08 RX ADMIN — HYDROMORPHONE HYDROCHLORIDE 1 MG: 1 INJECTION, SOLUTION INTRAMUSCULAR; INTRAVENOUS; SUBCUTANEOUS at 22:33

## 2023-12-08 RX ADMIN — HYDROMORPHONE HYDROCHLORIDE 1 MG: 1 INJECTION, SOLUTION INTRAMUSCULAR; INTRAVENOUS; SUBCUTANEOUS at 10:26

## 2023-12-08 RX ADMIN — FLUOXETINE HYDROCHLORIDE 40 MG: 20 CAPSULE ORAL at 08:24

## 2023-12-08 ASSESSMENT — PAIN DESCRIPTION - ORIENTATION
ORIENTATION: LEFT

## 2023-12-08 ASSESSMENT — PAIN SCALES - GENERAL
PAINLEVEL_OUTOF10: 8
PAINLEVEL_OUTOF10: 8
PAINLEVEL_OUTOF10: 7
PAINLEVEL_OUTOF10: 8
PAINLEVEL_OUTOF10: 10
PAINLEVEL_OUTOF10: 10
PAINLEVEL_OUTOF10: 5
PAINLEVEL_OUTOF10: 7
PAINLEVEL_OUTOF10: 9
PAINLEVEL_OUTOF10: 8
PAINLEVEL_OUTOF10: 7
PAINLEVEL_OUTOF10: 7
PAINLEVEL_OUTOF10: 8
PAINLEVEL_OUTOF10: 9
PAINLEVEL_OUTOF10: 7
PAINLEVEL_OUTOF10: 8
PAINLEVEL_OUTOF10: 7

## 2023-12-08 ASSESSMENT — PAIN DESCRIPTION - DESCRIPTORS
DESCRIPTORS: SHARP;BURNING
DESCRIPTORS: ACHING;PRESSURE;SHOOTING
DESCRIPTORS: STABBING
DESCRIPTORS: SHARP;SHOOTING
DESCRIPTORS: BURNING;SHARP
DESCRIPTORS: SHARP;STABBING
DESCRIPTORS: SHARP;BURNING
DESCRIPTORS: ACHING;BURNING
DESCRIPTORS: ACHING;BURNING

## 2023-12-08 ASSESSMENT — PAIN DESCRIPTION - FREQUENCY
FREQUENCY: CONTINUOUS
FREQUENCY: CONTINUOUS

## 2023-12-08 ASSESSMENT — PAIN DESCRIPTION - LOCATION
LOCATION: HIP
LOCATION: LEG;HIP
LOCATION: HIP

## 2023-12-08 ASSESSMENT — PAIN DESCRIPTION - ONSET
ONSET: ON-GOING
ONSET: ON-GOING

## 2023-12-08 ASSESSMENT — PAIN DESCRIPTION - PAIN TYPE
TYPE: CHRONIC PAIN;ACUTE PAIN
TYPE: CHRONIC PAIN;ACUTE PAIN

## 2023-12-08 ASSESSMENT — PAIN - FUNCTIONAL ASSESSMENT
PAIN_FUNCTIONAL_ASSESSMENT: PREVENTS OR INTERFERES WITH MANY ACTIVE NOT PASSIVE ACTIVITIES
PAIN_FUNCTIONAL_ASSESSMENT: PREVENTS OR INTERFERES WITH MANY ACTIVE NOT PASSIVE ACTIVITIES

## 2023-12-08 NOTE — DISCHARGE INSTR - DIET

## 2023-12-08 NOTE — CARE COORDINATION
Transitional planning   Called spoke with   Spoke with Dr Lloyd Barton about discharge he he will not be discharging today.     Called [facility] [phone] Emilee and they can accept tomorrow if discharged and to contact her at that number to arrange  SEE NOTE ABOUT [employer] GOING WITH HER

## 2023-12-08 NOTE — PLAN OF CARE
Problem: Discharge Planning  Goal: Discharge to home or other facility with appropriate resources  12/7/2023 2314 by Bela Gold RN  Outcome: Progressing     Problem: Pain  Goal: Verbalizes/displays adequate comfort level or baseline comfort level  12/7/2023 2314 by Bela Gold RN  Outcome: Progressing     Problem: Safety - Adult  Goal: Free from fall injury  12/7/2023 2314 by Bela Gold RN  Outcome: Progressing     Problem: ABCDS Injury Assessment  Goal: Absence of physical injury  12/7/2023 2314 by Bela Gold RN  Outcome: Progressing     Problem: Skin/Tissue Integrity  Goal: Absence of new skin breakdown  Description: 1. Monitor for areas of redness and/or skin breakdown  2. Assess vascular access sites hourly  3. Every 4-6 hours minimum:  Change oxygen saturation probe site  4. Every 4-6 hours:  If on nasal continuous positive airway pressure, respiratory therapy assess nares and determine need for appliance change or resting period.   12/7/2023 2314 by Bela Gold RN  Outcome: Progressing     Problem: Chronic Conditions and Co-morbidities  Goal: Patient's chronic conditions and co-morbidity symptoms are monitored and maintained or improved  12/7/2023 2314 by Bela Gold RN  Outcome: Progressing     Problem: Nutrition Deficit:  Goal: Optimize nutritional status  12/7/2023 2314 by Bela Gold RN  Outcome: Progressing

## 2023-12-09 VITALS
BODY MASS INDEX: 32.54 KG/M2 | WEIGHT: 214.73 LBS | RESPIRATION RATE: 10 BRPM | TEMPERATURE: 98.6 F | OXYGEN SATURATION: 95 % | SYSTOLIC BLOOD PRESSURE: 86 MMHG | HEART RATE: 77 BPM | DIASTOLIC BLOOD PRESSURE: 56 MMHG | HEIGHT: 68 IN

## 2023-12-09 LAB
GLUCOSE BLD-MCNC: 121 MG/DL (ref 65–105)
GLUCOSE BLD-MCNC: 124 MG/DL (ref 65–105)
GLUCOSE BLD-MCNC: 167 MG/DL (ref 65–105)
GLUCOSE BLD-MCNC: 69 MG/DL (ref 65–105)

## 2023-12-09 PROCEDURE — 99239 HOSP IP/OBS DSCHRG MGMT >30: CPT | Performed by: INTERNAL MEDICINE

## 2023-12-09 PROCEDURE — 6370000000 HC RX 637 (ALT 250 FOR IP): Performed by: INTERNAL MEDICINE

## 2023-12-09 PROCEDURE — 82947 ASSAY GLUCOSE BLOOD QUANT: CPT

## 2023-12-09 PROCEDURE — 6360000002 HC RX W HCPCS: Performed by: INTERNAL MEDICINE

## 2023-12-09 PROCEDURE — 6370000000 HC RX 637 (ALT 250 FOR IP)

## 2023-12-09 PROCEDURE — 1200000000 HC SEMI PRIVATE

## 2023-12-09 PROCEDURE — 2580000003 HC RX 258

## 2023-12-09 RX ORDER — OXYCODONE HYDROCHLORIDE AND ACETAMINOPHEN 5; 325 MG/1; MG/1
2 TABLET ORAL EVERY 4 HOURS PRN
Qty: 360 TABLET | Refills: 0 | Status: SHIPPED | OUTPATIENT
Start: 2023-12-09 | End: 2024-01-08

## 2023-12-09 RX ORDER — HYDROMORPHONE HYDROCHLORIDE 2 MG/1
4 TABLET ORAL 2 TIMES DAILY PRN
Qty: 60 TABLET | Refills: 0 | Status: SHIPPED | OUTPATIENT
Start: 2023-12-09 | End: 2024-01-08

## 2023-12-09 RX ADMIN — OXYCODONE AND ACETAMINOPHEN 2 TABLET: 5; 325 TABLET ORAL at 00:17

## 2023-12-09 RX ADMIN — HYDROMORPHONE HYDROCHLORIDE 1 MG: 1 INJECTION, SOLUTION INTRAMUSCULAR; INTRAVENOUS; SUBCUTANEOUS at 09:08

## 2023-12-09 RX ADMIN — OXYCODONE AND ACETAMINOPHEN 2 TABLET: 5; 325 TABLET ORAL at 11:40

## 2023-12-09 RX ADMIN — RIFAMPIN 600 MG: 300 CAPSULE ORAL at 08:38

## 2023-12-09 RX ADMIN — FLUOXETINE HYDROCHLORIDE 40 MG: 20 CAPSULE ORAL at 08:38

## 2023-12-09 RX ADMIN — HYDROMORPHONE HYDROCHLORIDE 1 MG: 1 INJECTION, SOLUTION INTRAMUSCULAR; INTRAVENOUS; SUBCUTANEOUS at 16:24

## 2023-12-09 RX ADMIN — METOPROLOL TARTRATE 25 MG: 25 TABLET, FILM COATED ORAL at 08:39

## 2023-12-09 RX ADMIN — CYCLOBENZAPRINE 10 MG: 10 TABLET, FILM COATED ORAL at 01:54

## 2023-12-09 RX ADMIN — DILTIAZEM HYDROCHLORIDE 30 MG: 30 TABLET, FILM COATED ORAL at 06:01

## 2023-12-09 RX ADMIN — DILTIAZEM HYDROCHLORIDE 30 MG: 30 TABLET, FILM COATED ORAL at 00:17

## 2023-12-09 RX ADMIN — HYDROMORPHONE HYDROCHLORIDE 1 MG: 1 INJECTION, SOLUTION INTRAMUSCULAR; INTRAVENOUS; SUBCUTANEOUS at 19:27

## 2023-12-09 RX ADMIN — SODIUM CHLORIDE, PRESERVATIVE FREE 10 ML: 5 INJECTION INTRAVENOUS at 08:39

## 2023-12-09 RX ADMIN — HYDROMORPHONE HYDROCHLORIDE 1 MG: 1 INJECTION, SOLUTION INTRAMUSCULAR; INTRAVENOUS; SUBCUTANEOUS at 01:54

## 2023-12-09 RX ADMIN — FONDAPARINUX SODIUM 10 MG: 5 INJECTION, SOLUTION SUBCUTANEOUS at 08:38

## 2023-12-09 RX ADMIN — Medication 2000 MG: at 00:17

## 2023-12-09 RX ADMIN — Medication 2000 MG: at 11:47

## 2023-12-09 RX ADMIN — OXYCODONE AND ACETAMINOPHEN 2 TABLET: 5; 325 TABLET ORAL at 18:02

## 2023-12-09 RX ADMIN — HYDROMORPHONE HYDROCHLORIDE 1 MG: 1 INJECTION, SOLUTION INTRAMUSCULAR; INTRAVENOUS; SUBCUTANEOUS at 06:01

## 2023-12-09 RX ADMIN — OXYCODONE HYDROCHLORIDE AND ACETAMINOPHEN 1 TABLET: 5; 325 TABLET ORAL at 04:47

## 2023-12-09 RX ADMIN — HYDROMORPHONE HYDROCHLORIDE 1 MG: 1 INJECTION, SOLUTION INTRAMUSCULAR; INTRAVENOUS; SUBCUTANEOUS at 13:12

## 2023-12-09 RX ADMIN — DILTIAZEM HYDROCHLORIDE 30 MG: 30 TABLET, FILM COATED ORAL at 11:43

## 2023-12-09 RX ADMIN — Medication 16 G: at 08:39

## 2023-12-09 RX ADMIN — BUPROPION HYDROCHLORIDE 150 MG: 150 TABLET, EXTENDED RELEASE ORAL at 08:38

## 2023-12-09 ASSESSMENT — PAIN SCALES - GENERAL
PAINLEVEL_OUTOF10: 10
PAINLEVEL_OUTOF10: 9
PAINLEVEL_OUTOF10: 8
PAINLEVEL_OUTOF10: 9
PAINLEVEL_OUTOF10: 10
PAINLEVEL_OUTOF10: 10
PAINLEVEL_OUTOF10: 9
PAINLEVEL_OUTOF10: 6
PAINLEVEL_OUTOF10: 10

## 2023-12-09 ASSESSMENT — PAIN DESCRIPTION - LOCATION
LOCATION: HIP

## 2023-12-09 ASSESSMENT — PAIN DESCRIPTION - DESCRIPTORS
DESCRIPTORS: SHOOTING;SHARP
DESCRIPTORS: SHARP;SHOOTING
DESCRIPTORS: SHOOTING;SHARP
DESCRIPTORS: SHOOTING
DESCRIPTORS: SHOOTING;SHARP

## 2023-12-09 ASSESSMENT — PAIN DESCRIPTION - ORIENTATION
ORIENTATION: LEFT

## 2023-12-09 NOTE — CARE COORDINATION
Pt with transitional planning & d/c to [address] [facility] today w/ pending transportation confirmation. Called & spoke to [facility] [place] with bed available & updated w/  scheduled for 730 pm per [facility]  today. Discharge [address] Case Management Department  Written by: Sergio Morgan    Patient Name: Ailyn Casas  Attending Provider: Mary Mendoza MD  Admit Date: 2023  1:55 AM  MRN: [id number]  Account: [de-identified]                     : 1980  Discharge Date:       Disposition: [facility] w/ wound [employer] wound vac [place] to  pt.  By 730 pm    Sergio Morgan

## 2023-12-09 NOTE — DISCHARGE SUMMARY
Discharge Summary    Date: 12/8/2023  Patient Name: Julian Soriano    YOB: 1980     Age: 39 y.o. Admit Date: 11/26/2023  Discharge Date: 12/8/2023  Discharge Condition: Good    Admission Diagnosis  Left hip pain [M25.552]; Left hip postoperative wound infection [T81.49XA]; Prosthetic hip infection, initial encounter [facility]) [T84.59XA, Z96.649]      Discharge Diagnosis  Principal Problem:    Prosthetic hip infection, initial encounter [facility])  Active Problems:    Alcohol dependence in remission ([address] )    Bipolar 1 disorder, mixed, severe ([address] [facility])    History of DVT of lower extremity    GERD (gastroesophageal reflux disease)    Type 2 diabetes mellitus with diabetic polyneuropathy, with long-term current use of insulin ([facility])    Essential hypertension    History of pulmonary embolism    Antiphospholipid syndrome ([facility])    Chronic post-traumatic stress disorder (PTSD)    Anxiety    Left hip postoperative wound infection    Left hip pain    Wound dehiscence    Prosthetic hip infection ([address])  Resolved Problems:    * No resolved hospital problems. [facility] Stay  Narrative of Hospital Course:  Patient has a past medical history of prior MRSA infection, anxiety, alcohol abuse and drug abuse, history of meigdio-en-y gastric bypass, and bipolar type 1. She is at [address] [place] [address] on this occasion for a scheduled removal of left hip prosthesis with placement of antibiotic spacer with irrigation and excisional debridement of the skin down to and including the bone of 10 x 20 cm wound to left hip per Dr. Jorge Davies with Ortho. Patient had a left hip hemiarthroplasty on 7/19/2023. Wound staples were removed on 8/2/23. Patient states that wound was closed at time of staple removal. However, after about a week the incision started to separate and eventually started to drain. Her wound nurse measured an opening approximately 6 cm deep.   The RN contacted the orthopedic surgeon who sent patient to the emergency
tablet  Comments:  Reason for Stopping:          Time Spent on Discharge:  35 minutes were spent in patient examination, evaluation, counseling as well as medication reconciliation, prescriptions for required medications, discharge plan, and follow up.     Electronically signed by Tammy Severe, MD on 12/9/23 at 8:49 AM EST

## 2023-12-10 NOTE — FLOWSHEET NOTE
Patient discharged to [facility] with [facility] transport. Report called to [facility] by previous shift nurse. KCI equipment sent with patient per case management note. All personal property sent with patient.

## 2023-12-10 NOTE — CARE COORDINATION
Pt with d/c to [place] [facility] [address] called at 11pm on 12/9 with improper KCI vac canisters accompanied the pt. On 12/10 called Em[facility]ños x 2 msgs. Called the facility x 3 times with last call the RN William Marie who has the pt. Today notified me that their team called hospital last night & surgical resident met them w/2 x canisters given. ERIC - Monalisa Cueto gave number to [address] [facility] 2 CM phone in case they need any further canisters. Will update Betsey Rivers from [employer] once she returns our calls.

## 2023-12-11 LAB
GLUCOSE BLD-MCNC: 84 MG/DL (ref 65–105)
MICROORGANISM SPEC CULT: NORMAL
SERVICE CMNT-IMP: NORMAL
SPECIMEN DESCRIPTION: NORMAL

## 2023-12-22 ENCOUNTER — HOSPITAL ENCOUNTER (EMERGENCY)
Age: 43
Discharge: ANOTHER HOSPITAL | DRG: 863 | End: 2023-12-22
Attending: EMERGENCY MEDICINE
Payer: MEDICARE

## 2023-12-22 ENCOUNTER — APPOINTMENT (OUTPATIENT)
Dept: GENERAL RADIOLOGY | Age: 43
DRG: 863 | End: 2023-12-22
Payer: MEDICARE

## 2023-12-22 ENCOUNTER — HOSPITAL ENCOUNTER (INPATIENT)
Age: 43
LOS: 4 days | Discharge: ANOTHER HOSPITAL | DRG: 863 | End: 2023-12-26
Attending: EMERGENCY MEDICINE | Admitting: HOSPITALIST
Payer: MEDICARE

## 2023-12-22 VITALS
HEART RATE: 75 BPM | OXYGEN SATURATION: 95 % | TEMPERATURE: 98.7 F | BODY MASS INDEX: 32.43 KG/M2 | WEIGHT: 214 LBS | HEIGHT: 68 IN | DIASTOLIC BLOOD PRESSURE: 63 MMHG | SYSTOLIC BLOOD PRESSURE: 109 MMHG | RESPIRATION RATE: 22 BRPM

## 2023-12-22 DIAGNOSIS — R52 INTRACTABLE PAIN: ICD-10-CM

## 2023-12-22 DIAGNOSIS — T81.31XA DEHISCENCE OF OPERATIVE WOUND, INITIAL ENCOUNTER: Primary | ICD-10-CM

## 2023-12-22 DIAGNOSIS — T81.40XA POSTOPERATIVE INFECTION, UNSPECIFIED TYPE, INITIAL ENCOUNTER: Primary | ICD-10-CM

## 2023-12-22 LAB
ALBUMIN SERPL-MCNC: 2.2 G/DL (ref 3.5–5.2)
ALBUMIN/GLOB SERPL: 0.8 {RATIO} (ref 1–2.5)
ALP SERPL-CCNC: 131 U/L (ref 35–104)
ALT SERPL-CCNC: 8 U/L (ref 5–33)
ANION GAP SERPL CALCULATED.3IONS-SCNC: 10 MMOL/L (ref 9–17)
ANION GAP SERPL CALCULATED.3IONS-SCNC: 11 MMOL/L (ref 9–17)
AST SERPL-CCNC: 17 U/L
BASOPHILS # BLD: 0 K/UL (ref 0–0.2)
BASOPHILS NFR BLD: 0 % (ref 0–2)
BILIRUB SERPL-MCNC: <0.1 MG/DL (ref 0.3–1.2)
BUN SERPL-MCNC: 6 MG/DL (ref 6–20)
BUN SERPL-MCNC: 7 MG/DL (ref 6–20)
CALCIUM SERPL-MCNC: 7.9 MG/DL (ref 8.6–10.4)
CALCIUM SERPL-MCNC: 8.1 MG/DL (ref 8.6–10.4)
CHLORIDE SERPL-SCNC: 104 MMOL/L (ref 98–107)
CHLORIDE SERPL-SCNC: 106 MMOL/L (ref 98–107)
CO2 SERPL-SCNC: 23 MMOL/L (ref 20–31)
CO2 SERPL-SCNC: 25 MMOL/L (ref 20–31)
CREAT SERPL-MCNC: 0.4 MG/DL (ref 0.5–0.9)
CREAT SERPL-MCNC: 0.5 MG/DL (ref 0.5–0.9)
CRP SERPL HS-MCNC: 31.8 MG/L (ref 0–5)
CRP SERPL HS-MCNC: 32.4 MG/L (ref 0–5)
EOSINOPHIL # BLD: 0 K/UL (ref 0–0.4)
EOSINOPHILS RELATIVE PERCENT: 0 % (ref 0–4)
ERYTHROCYTE [DISTWIDTH] IN BLOOD BY AUTOMATED COUNT: 17.1 % (ref 11.5–14.9)
ERYTHROCYTE [SEDIMENTATION RATE] IN BLOOD BY PHOTOMETRIC METHOD: 34 MM/HR (ref 0–20)
ERYTHROCYTE [SEDIMENTATION RATE] IN BLOOD BY PHOTOMETRIC METHOD: 36 MM/HR (ref 0–20)
GFR SERPL CREATININE-BSD FRML MDRD: >60 ML/MIN/1.73M2
GFR SERPL CREATININE-BSD FRML MDRD: >60 ML/MIN/1.73M2
GLUCOSE SERPL-MCNC: 128 MG/DL (ref 70–99)
GLUCOSE SERPL-MCNC: 89 MG/DL (ref 70–99)
HCT VFR BLD AUTO: 29.3 % (ref 36–46)
HGB BLD-MCNC: 9.5 G/DL (ref 12–16)
INR PPP: 1.1
LACTATE BLDV-SCNC: 0.7 MMOL/L (ref 0.5–2.2)
LACTIC ACID, WHOLE BLOOD: 0.9 MMOL/L (ref 0.7–2.1)
LYMPHOCYTES NFR BLD: 1.01 K/UL (ref 1–4.8)
LYMPHOCYTES RELATIVE PERCENT: 18 % (ref 24–44)
MCH RBC QN AUTO: 30.1 PG (ref 26–34)
MCHC RBC AUTO-ENTMCNC: 32.5 G/DL (ref 31–37)
MCV RBC AUTO: 92.8 FL (ref 80–100)
MONOCYTES NFR BLD: 0.34 K/UL (ref 0.1–1.3)
MONOCYTES NFR BLD: 6 % (ref 1–7)
MORPHOLOGY: ABNORMAL
MORPHOLOGY: ABNORMAL
NEUTROPHILS NFR BLD: 76 % (ref 36–66)
NEUTS SEG NFR BLD: 4.25 K/UL (ref 1.3–9.1)
PLATELET # BLD AUTO: 414 K/UL (ref 150–450)
PMV BLD AUTO: 8.1 FL (ref 6–12)
POTASSIUM SERPL-SCNC: 3.9 MMOL/L (ref 3.7–5.3)
POTASSIUM SERPL-SCNC: 4 MMOL/L (ref 3.7–5.3)
PROCALCITONIN SERPL-MCNC: 0.15 NG/ML
PROT SERPL-MCNC: 5 G/DL (ref 6.4–8.3)
PROTHROMBIN TIME: 14 SEC (ref 11.7–14.9)
RBC # BLD AUTO: 3.16 M/UL (ref 4–5.2)
SODIUM SERPL-SCNC: 138 MMOL/L (ref 135–144)
SODIUM SERPL-SCNC: 141 MMOL/L (ref 135–144)
WBC OTHER # BLD: 5.6 K/UL (ref 3.5–11)

## 2023-12-22 PROCEDURE — 96376 TX/PRO/DX INJ SAME DRUG ADON: CPT

## 2023-12-22 PROCEDURE — 85652 RBC SED RATE AUTOMATED: CPT

## 2023-12-22 PROCEDURE — 83605 ASSAY OF LACTIC ACID: CPT

## 2023-12-22 PROCEDURE — 86140 C-REACTIVE PROTEIN: CPT

## 2023-12-22 PROCEDURE — 36415 COLL VENOUS BLD VENIPUNCTURE: CPT

## 2023-12-22 PROCEDURE — 99223 1ST HOSP IP/OBS HIGH 75: CPT | Performed by: HOSPITALIST

## 2023-12-22 PROCEDURE — 80053 COMPREHEN METABOLIC PANEL: CPT

## 2023-12-22 PROCEDURE — 2060000000 HC ICU INTERMEDIATE R&B

## 2023-12-22 PROCEDURE — 2500000003 HC RX 250 WO HCPCS: Performed by: HOSPITALIST

## 2023-12-22 PROCEDURE — 6360000002 HC RX W HCPCS: Performed by: STUDENT IN AN ORGANIZED HEALTH CARE EDUCATION/TRAINING PROGRAM

## 2023-12-22 PROCEDURE — 99285 EMERGENCY DEPT VISIT HI MDM: CPT

## 2023-12-22 PROCEDURE — 2580000003 HC RX 258: Performed by: STUDENT IN AN ORGANIZED HEALTH CARE EDUCATION/TRAINING PROGRAM

## 2023-12-22 PROCEDURE — 96375 TX/PRO/DX INJ NEW DRUG ADDON: CPT

## 2023-12-22 PROCEDURE — 85025 COMPLETE CBC W/AUTO DIFF WBC: CPT

## 2023-12-22 PROCEDURE — 87040 BLOOD CULTURE FOR BACTERIA: CPT

## 2023-12-22 PROCEDURE — 96374 THER/PROPH/DIAG INJ IV PUSH: CPT

## 2023-12-22 PROCEDURE — 6370000000 HC RX 637 (ALT 250 FOR IP): Performed by: HOSPITALIST

## 2023-12-22 PROCEDURE — 80048 BASIC METABOLIC PNL TOTAL CA: CPT

## 2023-12-22 PROCEDURE — 96365 THER/PROPH/DIAG IV INF INIT: CPT

## 2023-12-22 PROCEDURE — 84145 PROCALCITONIN (PCT): CPT

## 2023-12-22 PROCEDURE — 6360000002 HC RX W HCPCS: Performed by: EMERGENCY MEDICINE

## 2023-12-22 PROCEDURE — 85610 PROTHROMBIN TIME: CPT

## 2023-12-22 RX ORDER — SODIUM CHLORIDE 9 MG/ML
INJECTION, SOLUTION INTRAVENOUS CONTINUOUS
Status: DISCONTINUED | OUTPATIENT
Start: 2023-12-22 | End: 2023-12-24

## 2023-12-22 RX ORDER — FONDAPARINUX SODIUM 5 MG/.4ML
10 INJECTION SUBCUTANEOUS DAILY
Status: DISCONTINUED | OUTPATIENT
Start: 2023-12-23 | End: 2023-12-26 | Stop reason: HOSPADM

## 2023-12-22 RX ORDER — MIDODRINE HYDROCHLORIDE 5 MG/1
2.5 TABLET ORAL 2 TIMES DAILY PRN
Status: DISCONTINUED | OUTPATIENT
Start: 2023-12-22 | End: 2023-12-23

## 2023-12-22 RX ORDER — SODIUM CHLORIDE 9 MG/ML
INJECTION, SOLUTION INTRAVENOUS PRN
Status: DISCONTINUED | OUTPATIENT
Start: 2023-12-22 | End: 2023-12-26 | Stop reason: HOSPADM

## 2023-12-22 RX ORDER — POLYETHYLENE GLYCOL 3350 17 G/17G
17 POWDER, FOR SOLUTION ORAL DAILY PRN
Status: DISCONTINUED | OUTPATIENT
Start: 2023-12-22 | End: 2023-12-26 | Stop reason: HOSPADM

## 2023-12-22 RX ORDER — FLUOXETINE HYDROCHLORIDE 20 MG/1
40 CAPSULE ORAL DAILY
Status: DISCONTINUED | OUTPATIENT
Start: 2023-12-23 | End: 2023-12-26 | Stop reason: HOSPADM

## 2023-12-22 RX ORDER — 0.9 % SODIUM CHLORIDE 0.9 %
1000 INTRAVENOUS SOLUTION INTRAVENOUS ONCE
Status: COMPLETED | OUTPATIENT
Start: 2023-12-22 | End: 2023-12-22

## 2023-12-22 RX ORDER — SODIUM CHLORIDE 0.9 % (FLUSH) 0.9 %
10 SYRINGE (ML) INJECTION PRN
Status: DISCONTINUED | OUTPATIENT
Start: 2023-12-22 | End: 2023-12-26 | Stop reason: HOSPADM

## 2023-12-22 RX ORDER — ONDANSETRON 2 MG/ML
4 INJECTION INTRAMUSCULAR; INTRAVENOUS ONCE
Status: COMPLETED | OUTPATIENT
Start: 2023-12-22 | End: 2023-12-22

## 2023-12-22 RX ORDER — INSULIN GLARGINE 100 [IU]/ML
10 INJECTION, SOLUTION SUBCUTANEOUS NIGHTLY
Status: DISCONTINUED | OUTPATIENT
Start: 2023-12-22 | End: 2023-12-26 | Stop reason: HOSPADM

## 2023-12-22 RX ORDER — PANTOPRAZOLE SODIUM 40 MG/10ML
40 INJECTION, POWDER, LYOPHILIZED, FOR SOLUTION INTRAVENOUS DAILY
Status: DISCONTINUED | OUTPATIENT
Start: 2023-12-23 | End: 2023-12-24 | Stop reason: SDUPTHER

## 2023-12-22 RX ORDER — POTASSIUM CHLORIDE 7.45 MG/ML
10 INJECTION INTRAVENOUS PRN
Status: DISCONTINUED | OUTPATIENT
Start: 2023-12-22 | End: 2023-12-26 | Stop reason: HOSPADM

## 2023-12-22 RX ORDER — ACETAMINOPHEN 325 MG/1
650 TABLET ORAL EVERY 6 HOURS PRN
Status: DISCONTINUED | OUTPATIENT
Start: 2023-12-22 | End: 2023-12-26 | Stop reason: HOSPADM

## 2023-12-22 RX ORDER — BUPROPION HYDROCHLORIDE 150 MG/1
150 TABLET ORAL EVERY MORNING
Status: DISCONTINUED | OUTPATIENT
Start: 2023-12-23 | End: 2023-12-26 | Stop reason: HOSPADM

## 2023-12-22 RX ORDER — FENTANYL CITRATE 50 UG/ML
100 INJECTION, SOLUTION INTRAMUSCULAR; INTRAVENOUS ONCE
Status: COMPLETED | OUTPATIENT
Start: 2023-12-22 | End: 2023-12-22

## 2023-12-22 RX ORDER — ONDANSETRON 4 MG/1
4 TABLET, ORALLY DISINTEGRATING ORAL EVERY 8 HOURS PRN
Status: DISCONTINUED | OUTPATIENT
Start: 2023-12-22 | End: 2023-12-26 | Stop reason: HOSPADM

## 2023-12-22 RX ORDER — MAGNESIUM SULFATE 1 G/100ML
1000 INJECTION INTRAVENOUS PRN
Status: DISCONTINUED | OUTPATIENT
Start: 2023-12-22 | End: 2023-12-26 | Stop reason: HOSPADM

## 2023-12-22 RX ORDER — ACETAMINOPHEN 650 MG/1
650 SUPPOSITORY RECTAL EVERY 6 HOURS PRN
Status: DISCONTINUED | OUTPATIENT
Start: 2023-12-22 | End: 2023-12-26 | Stop reason: HOSPADM

## 2023-12-22 RX ORDER — POTASSIUM CHLORIDE 20 MEQ/1
40 TABLET, EXTENDED RELEASE ORAL PRN
Status: DISCONTINUED | OUTPATIENT
Start: 2023-12-22 | End: 2023-12-26 | Stop reason: HOSPADM

## 2023-12-22 RX ORDER — SODIUM CHLORIDE 0.9 % (FLUSH) 0.9 %
5-40 SYRINGE (ML) INJECTION EVERY 12 HOURS SCHEDULED
Status: DISCONTINUED | OUTPATIENT
Start: 2023-12-22 | End: 2023-12-26 | Stop reason: HOSPADM

## 2023-12-22 RX ORDER — ONDANSETRON 2 MG/ML
4 INJECTION INTRAMUSCULAR; INTRAVENOUS EVERY 6 HOURS PRN
Status: DISCONTINUED | OUTPATIENT
Start: 2023-12-22 | End: 2023-12-26 | Stop reason: HOSPADM

## 2023-12-22 RX ADMIN — VANCOMYCIN HYDROCHLORIDE 1500 MG: 1.5 INJECTION, POWDER, LYOPHILIZED, FOR SOLUTION INTRAVENOUS at 20:54

## 2023-12-22 RX ADMIN — INSULIN GLARGINE 10 UNITS: 100 INJECTION, SOLUTION SUBCUTANEOUS at 23:31

## 2023-12-22 RX ADMIN — HYDROMORPHONE HYDROCHLORIDE 1 MG: 1 INJECTION, SOLUTION INTRAMUSCULAR; INTRAVENOUS; SUBCUTANEOUS at 20:45

## 2023-12-22 RX ADMIN — FENTANYL CITRATE 100 MCG: 50 INJECTION, SOLUTION INTRAMUSCULAR; INTRAVENOUS at 16:59

## 2023-12-22 RX ADMIN — DILTIAZEM HYDROCHLORIDE 30 MG: 30 TABLET, FILM COATED ORAL at 23:31

## 2023-12-22 RX ADMIN — Medication 0.2 MG/KG/HR: at 23:36

## 2023-12-22 RX ADMIN — SODIUM CHLORIDE 1000 ML: 9 INJECTION, SOLUTION INTRAVENOUS at 20:00

## 2023-12-22 RX ADMIN — ONDANSETRON 4 MG: 2 INJECTION INTRAMUSCULAR; INTRAVENOUS at 15:25

## 2023-12-22 RX ADMIN — HYDROMORPHONE HYDROCHLORIDE 1 MG: 1 INJECTION, SOLUTION INTRAMUSCULAR; INTRAVENOUS; SUBCUTANEOUS at 19:29

## 2023-12-22 RX ADMIN — HYDROMORPHONE HYDROCHLORIDE 2 MG: 1 INJECTION, SOLUTION INTRAMUSCULAR; INTRAVENOUS; SUBCUTANEOUS at 15:25

## 2023-12-22 RX ADMIN — CEFEPIME 2000 MG: 2 INJECTION, POWDER, FOR SOLUTION INTRAVENOUS at 20:05

## 2023-12-22 NOTE — ED PROVIDER NOTES
Spoke with Dr. Jorge Davies. He recommends transfer to [facility]. Evaluation by orhto residents. Possible admission for pain control. Will need plastic evaluation, either at [facility]. [facility] or may need referral to a tertiary care center.      Lawrence Moran MD  12/22/23 [id number]

## 2023-12-22 NOTE — ED PROVIDER NOTES
[address] [facility] ED  Emergency Department Encounter  Emergency Medicine Resident     Pt Jackson Diana  MRN: [id number]  [facility] 1980  Date of evaluation: 12/22/23  PCP:  Kel Monreal  Note Started: 3:03 PM EST      CHIEF COMPLAINT       Chief Complaint   Patient presents with    Wound Check       HISTORY OF PRESENT ILLNESS  (Location/Symptom, Timing/Onset, Context/Setting, Quality, Duration, Modifying Factors, Severity.)      Ruth Ann Andrews is a 39 y.o. female who presents with left hip wound pain and dehiscence. Patient reports that she had her wound VAC taken off on Monday and since then she has noticed her wound has been opening and some of the stitches seem to be pulled. She reports that this wound is status post a hip replacement in July. She reports that she has been in a nursing home and they have been having difficulty getting on top of her pain with the Dilaudid and oral Percocet. She reports that she has been having diminished appetite and increased nausea and vomiting that is not hematemesis due to her pain. She does report some fever in the last 48 hours. She reports she been taking Tylenol as well. She reports she went to her wound care visit today and due to her significant pain they directed her to the ED. She denies any abdominal pain.     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

## 2023-12-22 NOTE — PLAN OF CARE
Received [facility] from [facility] ED with concern for patient's postoperative wound to the left hip. Media tab was reviewed, which showed large wound dehiscence to central portion of wound. Patient's lab markers are downtrending which is reassuring that patient's infection is being cleared, but patient continues to have issues with her postoperative wound despite multiple debridements, with assistance of Biologics with closure. Recommend transfer to [facility], [facility], [facility] for discussion with plastic surgery team to assist with wound closure given patient's ongoing poor wound healing potential.  I discussed this case with the ED provider. Recommend wet-to-dry dressings at this time until patient can be transferred to outside center.     Titus Olivares DO  4:43 PM 12/22/2023

## 2023-12-22 NOTE — ED TRIAGE NOTES
Mode of arrival (squad #, walk in, police, etc) : from wound care. Chief complaint(s): wound        Arrival Note (brief scenario, treatment PTA, etc). : patient presented to ED from wound care. Patient was unable to be seen due to increase in pain. Patient states she's had 6 surgeries on her hip with Dr. Karoline Iglesias. Patient presents with PICC line in L arm. Patient from [facility]. C= \"Have you ever felt that you should Cut down on your drinking? \"  No  A= \"Have people Annoyed you by criticizing your drinking? \"  No  G= \"Have you ever felt bad or Guilty about your drinking? \"  No  E= \"Have you ever had a drink as an Eye-opener first thing in the morning to steady your nerves or to help a hangover? \"  No      Deferred []      Reason for deferring: N/A    *If yes to two or more: probable alcohol abuse. *

## 2023-12-23 PROBLEM — T84.7XXA HARDWARE COMPLICATING WOUND INFECTION (HCC): Status: ACTIVE | Noted: 2023-12-23

## 2023-12-23 PROBLEM — T81.40XA POSTOPERATIVE INFECTION: Status: ACTIVE | Noted: 2023-12-23

## 2023-12-23 PROBLEM — M86.452 CHRONIC OSTEOMYELITIS OF LEFT FEMUR WITH DRAINING SINUS (HCC): Status: ACTIVE | Noted: 2023-12-23

## 2023-12-23 PROBLEM — A49.01 METHICILLIN SUSCEPTIBLE STAPHYLOCOCCUS AUREUS INFECTION: Status: ACTIVE | Noted: 2023-12-23

## 2023-12-23 LAB
ALBUMIN SERPL-MCNC: 2.1 G/DL (ref 3.5–5.2)
ALBUMIN/GLOB SERPL: 0.7 {RATIO} (ref 1–2.5)
ALP SERPL-CCNC: 125 U/L (ref 35–104)
ALT SERPL-CCNC: 7 U/L (ref 5–33)
ANION GAP SERPL CALCULATED.3IONS-SCNC: 9 MMOL/L (ref 9–17)
AST SERPL-CCNC: 12 U/L
BASOPHILS # BLD: 0.03 K/UL (ref 0–0.2)
BASOPHILS NFR BLD: 1 % (ref 0–2)
BILIRUB SERPL-MCNC: <0.1 MG/DL (ref 0.3–1.2)
BUN SERPL-MCNC: 6 MG/DL (ref 6–20)
CALCIUM SERPL-MCNC: 8.2 MG/DL (ref 8.6–10.4)
CHLORIDE SERPL-SCNC: 110 MMOL/L (ref 98–107)
CO2 SERPL-SCNC: 23 MMOL/L (ref 20–31)
CREAT SERPL-MCNC: 0.3 MG/DL (ref 0.5–0.9)
EOSINOPHIL # BLD: 0.05 K/UL (ref 0–0.44)
EOSINOPHILS RELATIVE PERCENT: 1 % (ref 1–4)
ERYTHROCYTE [DISTWIDTH] IN BLOOD BY AUTOMATED COUNT: 15.9 % (ref 11.8–14.4)
GFR SERPL CREATININE-BSD FRML MDRD: >60 ML/MIN/1.73M2
GLUCOSE BLD-MCNC: 125 MG/DL (ref 65–105)
GLUCOSE BLD-MCNC: 230 MG/DL (ref 65–105)
GLUCOSE SERPL-MCNC: 94 MG/DL (ref 70–99)
HCT VFR BLD AUTO: 35.3 % (ref 36.3–47.1)
HGB BLD-MCNC: 10.4 G/DL (ref 11.9–15.1)
IMM GRANULOCYTES # BLD AUTO: <0.03 K/UL (ref 0–0.3)
IMM GRANULOCYTES NFR BLD: 0 %
INR PPP: 1.1
LYMPHOCYTES NFR BLD: 0.84 K/UL (ref 1.1–3.7)
LYMPHOCYTES RELATIVE PERCENT: 21 % (ref 24–43)
MCH RBC QN AUTO: 29 PG (ref 25.2–33.5)
MCHC RBC AUTO-ENTMCNC: 29.5 G/DL (ref 28.4–34.8)
MCV RBC AUTO: 98.3 FL (ref 82.6–102.9)
MONOCYTES NFR BLD: 0.41 K/UL (ref 0.1–1.2)
MONOCYTES NFR BLD: 10 % (ref 3–12)
NEUTROPHILS NFR BLD: 67 % (ref 36–65)
NEUTS SEG NFR BLD: 2.73 K/UL (ref 1.5–8.1)
NRBC BLD-RTO: 0 PER 100 WBC
PLATELET # BLD AUTO: 349 K/UL (ref 138–453)
PMV BLD AUTO: 10.6 FL (ref 8.1–13.5)
POTASSIUM SERPL-SCNC: 3.7 MMOL/L (ref 3.7–5.3)
PROT SERPL-MCNC: 5.2 G/DL (ref 6.4–8.3)
PROTHROMBIN TIME: 13.5 SEC (ref 11.7–14.9)
RBC # BLD AUTO: 3.59 M/UL (ref 3.95–5.11)
RBC # BLD: ABNORMAL 10*6/UL
SODIUM SERPL-SCNC: 142 MMOL/L (ref 135–144)
WBC OTHER # BLD: 4.1 K/UL (ref 3.5–11.3)

## 2023-12-23 PROCEDURE — 6370000000 HC RX 637 (ALT 250 FOR IP): Performed by: HOSPITALIST

## 2023-12-23 PROCEDURE — 6360000002 HC RX W HCPCS: Performed by: NURSE PRACTITIONER

## 2023-12-23 PROCEDURE — 36415 COLL VENOUS BLD VENIPUNCTURE: CPT

## 2023-12-23 PROCEDURE — 85025 COMPLETE CBC W/AUTO DIFF WBC: CPT

## 2023-12-23 PROCEDURE — C9113 INJ PANTOPRAZOLE SODIUM, VIA: HCPCS | Performed by: HOSPITALIST

## 2023-12-23 PROCEDURE — 2060000000 HC ICU INTERMEDIATE R&B

## 2023-12-23 PROCEDURE — 85610 PROTHROMBIN TIME: CPT

## 2023-12-23 PROCEDURE — 99232 SBSQ HOSP IP/OBS MODERATE 35: CPT | Performed by: STUDENT IN AN ORGANIZED HEALTH CARE EDUCATION/TRAINING PROGRAM

## 2023-12-23 PROCEDURE — 2580000003 HC RX 258: Performed by: HOSPITALIST

## 2023-12-23 PROCEDURE — 82947 ASSAY GLUCOSE BLOOD QUANT: CPT

## 2023-12-23 PROCEDURE — 6360000002 HC RX W HCPCS: Performed by: HOSPITALIST

## 2023-12-23 PROCEDURE — 2500000003 HC RX 250 WO HCPCS: Performed by: NURSE PRACTITIONER

## 2023-12-23 PROCEDURE — 6370000000 HC RX 637 (ALT 250 FOR IP): Performed by: STUDENT IN AN ORGANIZED HEALTH CARE EDUCATION/TRAINING PROGRAM

## 2023-12-23 PROCEDURE — 6360000002 HC RX W HCPCS: Performed by: STUDENT IN AN ORGANIZED HEALTH CARE EDUCATION/TRAINING PROGRAM

## 2023-12-23 PROCEDURE — 80053 COMPREHEN METABOLIC PANEL: CPT

## 2023-12-23 RX ORDER — HYDROMORPHONE HYDROCHLORIDE 2 MG/1
1 TABLET ORAL EVERY 6 HOURS PRN
Status: DISCONTINUED | OUTPATIENT
Start: 2023-12-23 | End: 2023-12-26 | Stop reason: HOSPADM

## 2023-12-23 RX ORDER — OXYCODONE HYDROCHLORIDE AND ACETAMINOPHEN 5; 325 MG/1; MG/1
2 TABLET ORAL EVERY 4 HOURS PRN
Status: DISCONTINUED | OUTPATIENT
Start: 2023-12-23 | End: 2023-12-26 | Stop reason: HOSPADM

## 2023-12-23 RX ORDER — MORPHINE SULFATE 2 MG/ML
1 INJECTION, SOLUTION INTRAMUSCULAR; INTRAVENOUS EVERY 4 HOURS PRN
Status: DISCONTINUED | OUTPATIENT
Start: 2023-12-23 | End: 2023-12-26 | Stop reason: HOSPADM

## 2023-12-23 RX ORDER — HYDROMORPHONE HYDROCHLORIDE 2 MG/1
4 TABLET ORAL 2 TIMES DAILY PRN
Status: DISCONTINUED | OUTPATIENT
Start: 2023-12-23 | End: 2023-12-23

## 2023-12-23 RX ORDER — DILTIAZEM HYDROCHLORIDE 120 MG/1
120 CAPSULE, COATED, EXTENDED RELEASE ORAL DAILY
Status: DISCONTINUED | OUTPATIENT
Start: 2023-12-23 | End: 2023-12-26 | Stop reason: HOSPADM

## 2023-12-23 RX ADMIN — HYDROMORPHONE HYDROCHLORIDE 4 MG: 2 TABLET ORAL at 09:35

## 2023-12-23 RX ADMIN — INSULIN GLARGINE 10 UNITS: 100 INJECTION, SOLUTION SUBCUTANEOUS at 21:07

## 2023-12-23 RX ADMIN — MORPHINE SULFATE 1 MG: 2 INJECTION, SOLUTION INTRAMUSCULAR; INTRAVENOUS at 19:01

## 2023-12-23 RX ADMIN — CEFEPIME 2000 MG: 2 INJECTION, POWDER, FOR SOLUTION INTRAVENOUS at 09:40

## 2023-12-23 RX ADMIN — VANCOMYCIN HYDROCHLORIDE 1000 MG: 1 INJECTION, POWDER, LYOPHILIZED, FOR SOLUTION INTRAVENOUS at 05:11

## 2023-12-23 RX ADMIN — OXYCODONE AND ACETAMINOPHEN 2 TABLET: 5; 325 TABLET ORAL at 03:25

## 2023-12-23 RX ADMIN — SODIUM CHLORIDE, PRESERVATIVE FREE 10 ML: 5 INJECTION INTRAVENOUS at 21:09

## 2023-12-23 RX ADMIN — BUPROPION HYDROCHLORIDE 150 MG: 150 TABLET, EXTENDED RELEASE ORAL at 09:35

## 2023-12-23 RX ADMIN — FONDAPARINUX SODIUM 10 MG: 5 INJECTION, SOLUTION SUBCUTANEOUS at 09:35

## 2023-12-23 RX ADMIN — PANTOPRAZOLE SODIUM 40 MG: 40 INJECTION, POWDER, FOR SOLUTION INTRAVENOUS at 09:32

## 2023-12-23 RX ADMIN — OXYCODONE AND ACETAMINOPHEN 2 TABLET: 5; 325 TABLET ORAL at 20:54

## 2023-12-23 RX ADMIN — DILTIAZEM HYDROCHLORIDE 120 MG: 120 CAPSULE, COATED, EXTENDED RELEASE ORAL at 14:06

## 2023-12-23 RX ADMIN — Medication 2000 MG: at 18:16

## 2023-12-23 RX ADMIN — OXYCODONE AND ACETAMINOPHEN 2 TABLET: 5; 325 TABLET ORAL at 16:34

## 2023-12-23 RX ADMIN — SODIUM CHLORIDE: 9 INJECTION, SOLUTION INTRAVENOUS at 05:00

## 2023-12-23 RX ADMIN — ANTI-FUNGAL POWDER MICONAZOLE NITRATE TALC FREE: 1.42 POWDER TOPICAL at 09:41

## 2023-12-23 RX ADMIN — MORPHINE SULFATE 1 MG: 2 INJECTION, SOLUTION INTRAMUSCULAR; INTRAVENOUS at 23:08

## 2023-12-23 RX ADMIN — FLUOXETINE HYDROCHLORIDE 40 MG: 20 CAPSULE ORAL at 09:34

## 2023-12-23 RX ADMIN — DILTIAZEM HYDROCHLORIDE 30 MG: 30 TABLET, FILM COATED ORAL at 09:34

## 2023-12-23 RX ADMIN — MORPHINE SULFATE 1 MG: 2 INJECTION, SOLUTION INTRAMUSCULAR; INTRAVENOUS at 14:10

## 2023-12-23 RX ADMIN — OXYCODONE AND ACETAMINOPHEN 2 TABLET: 5; 325 TABLET ORAL at 11:12

## 2023-12-23 RX ADMIN — ANTI-FUNGAL POWDER MICONAZOLE NITRATE TALC FREE: 1.42 POWDER TOPICAL at 03:24

## 2023-12-23 RX ADMIN — METOPROLOL TARTRATE 25 MG: 25 TABLET ORAL at 09:35

## 2023-12-23 RX ADMIN — SODIUM CHLORIDE, PRESERVATIVE FREE 10 ML: 5 INJECTION INTRAVENOUS at 09:45

## 2023-12-23 NOTE — ED NOTES
Pt is resting comfortably on bed. RR regular, not in acute distress. Call light within reach.  , retired

## 2023-12-23 NOTE — ED NOTES
78-year-old female Carleene Mole, chronic nonhealing hip surgery, increasing pain and drainage, Dr. Kadeem Rutherford aware of the patient and Ortho residents to see.  Accepted by Dr Ga Blunt

## 2023-12-23 NOTE — H&P
[facility]  Office: [id number], DO, Donavon Lobo, DO, Gildardo Adams, DO, Jose C Stapleton, DO, Margarita Gonzales MD, Emily Llanes MD, Deidre Fulton MD, Amber Soliz MD,  Oscar Garber MD, Melissa Wolfe MD, Meagan Flowers MD,  Anne Avery MD, Stephanie Camejo MD, Brock Buckley, DO, Tien Sierra MD,  Ilia Staton, DO, Leona Eisenmenger, MD, Burt Paz MD, Saige Alvarado MD, Cheryle Spillers, MD,  Luis Soriano MD, Luz Flores MD, Jessica Richardson MD, Diana Brannon MD, Hiram Pinzon MD, Noa Billings MD, Torrie Sanchez, DO, Guerda Mccartney DO, Jass Thorpe MD,  Kirby Chi MD, Elayne Haider, CNP,  Nishant Sow, CNP, Toya Kurtz, CNP,  Zeynep Retana, [facility], Lera Sandhoff, CNP, Kyung Brooks, CNP, Leo Jessica, CNP, Rebeca Stafford, CNP, Funmilayo Ibanez, CNP, Krista Perez PAAjC, Arin Jean PA-C, Kevin, CNP, Gurjit Palacios, CNS, Víctor Chen, CNP, Estuardo Singleton, CNP, Brad Chance, CNP         [address]    HISTORY AND PHYSICAL EXAMINATION            Date:   12/22/2023  Patient name:  Yeyo Morales  Date of admission:  12/22/2023  6:50 PM  MRN:   [id number]  Account:  [de-identified]  YOB: 1980  PCP:    Nicci Escoto  Room:   22/22  Code Status:    Full Code    Chief Complaint:     Chief Complaint   Patient presents with    Hip Pain     Left hip pain, present wound from broken hip in july       History Obtained From:     patient, electronic medical record, Quality of history:  good historian    History of Present Illness:     Yeyo Morales is a 39 y.o. Non- / non  female who presents with Hip Pain (Left hip pain, present wound from broken hip in july)   and is admitted to the hospital for the management of Left hip postoperative wound infection. Is a 50-year-old female well-known to our service.   Patient had multiple recent visits

## 2023-12-23 NOTE — PLAN OF CARE
Problem: Discharge Planning  Goal: Discharge to home or other facility with appropriate resources  Outcome: Progressing     Problem: Pain  Goal: Verbalizes/displays adequate comfort level or baseline comfort level  Outcome: Progressing     Problem: Skin/Tissue Integrity  Goal: Absence of new skin breakdown  Description: 1. Monitor for areas of redness and/or skin breakdown  2. Assess vascular access sites hourly  3. Every 4-6 hours minimum:  Change oxygen saturation probe site  4. Every 4-6 hours:  If on nasal continuous positive airway pressure, respiratory therapy assess nares and determine need for appliance change or resting period.   Outcome: Progressing     Problem: Safety - Adult  Goal: Free from fall injury  Outcome: Progressing  Flowsheets (Taken 12/22/2023 2216)  Free From Fall Injury: Instruct family/caregiver on patient safety     Problem: ABCDS Injury Assessment  Goal: Absence of physical injury  Outcome: Progressing  Flowsheets (Taken 12/22/2023 2216)  Absence of Physical Injury: Implement safety measures based on patient assessment

## 2023-12-23 NOTE — ED TRIAGE NOTES
Pt arriving to ED 22 via ems, transfer from [facility]  Pt being seen for wound care of left hip following hip fracture in left hip  Ems reported wound care felt her wound was past their level of care and that she needed to come in for ortho consult  No other co at this time  Pt is resting on stretcher with call light within reach. Breathing is non labored and no acute distress is noted.    Will continue to follow plan of care

## 2023-12-23 NOTE — DISCHARGE INSTRUCTIONS
Orthopaedic Instructions:  -Weight bearing status: Weight bearing as tolerated with the left leg  -Dressings per wound care.  -Always work on ankle, knee motion to decrease swelling.  -Ice (20 minutes on and off 1 hour) and elevate to reduce swelling and throbbing pain.  -Call the office or come to Emergency Room if signs of infection appear (hot, swollen, red, draining pus, fever)  -Take medications as prescribed.  -Wean off narcotics (percocet/norco) as soon as possible. Do not take tylenol if still taking narcotics.  -Follow up with Dr. Jt Encinas in his office on return to [facility] after care at outside facility. Call [phone]  to schedule/confirm or with any questions/concerns.

## 2023-12-24 LAB
ANION GAP SERPL CALCULATED.3IONS-SCNC: 10 MMOL/L (ref 9–17)
BASOPHILS # BLD: <0.03 K/UL (ref 0–0.2)
BASOPHILS NFR BLD: 0 % (ref 0–2)
BUN SERPL-MCNC: 7 MG/DL (ref 6–20)
CALCIUM SERPL-MCNC: 8.4 MG/DL (ref 8.6–10.4)
CHLORIDE SERPL-SCNC: 106 MMOL/L (ref 98–107)
CO2 SERPL-SCNC: 25 MMOL/L (ref 20–31)
CREAT SERPL-MCNC: 0.3 MG/DL (ref 0.5–0.9)
CRP SERPL HS-MCNC: 20.1 MG/L (ref 0–5)
CRP SERPL HS-MCNC: 27.7 MG/L (ref 0–5)
EOSINOPHIL # BLD: 0.11 K/UL (ref 0–0.44)
EOSINOPHILS RELATIVE PERCENT: 2 % (ref 1–4)
ERYTHROCYTE [DISTWIDTH] IN BLOOD BY AUTOMATED COUNT: 15.6 % (ref 11.8–14.4)
GFR SERPL CREATININE-BSD FRML MDRD: >60 ML/MIN/1.73M2
GLUCOSE BLD-MCNC: 123 MG/DL (ref 65–105)
GLUCOSE BLD-MCNC: 130 MG/DL (ref 65–105)
GLUCOSE BLD-MCNC: 137 MG/DL (ref 65–105)
GLUCOSE BLD-MCNC: 188 MG/DL (ref 65–105)
GLUCOSE SERPL-MCNC: 149 MG/DL (ref 70–99)
HCT VFR BLD AUTO: 34.2 % (ref 36.3–47.1)
HGB BLD-MCNC: 10.7 G/DL (ref 11.9–15.1)
IMM GRANULOCYTES # BLD AUTO: 0.03 K/UL (ref 0–0.3)
IMM GRANULOCYTES NFR BLD: 1 %
LYMPHOCYTES NFR BLD: 1.44 K/UL (ref 1.1–3.7)
LYMPHOCYTES RELATIVE PERCENT: 28 % (ref 24–43)
MCH RBC QN AUTO: 29.2 PG (ref 25.2–33.5)
MCHC RBC AUTO-ENTMCNC: 31.3 G/DL (ref 28.4–34.8)
MCV RBC AUTO: 93.2 FL (ref 82.6–102.9)
MONOCYTES NFR BLD: 0.7 K/UL (ref 0.1–1.2)
MONOCYTES NFR BLD: 14 % (ref 3–12)
NEUTROPHILS NFR BLD: 55 % (ref 36–65)
NEUTS SEG NFR BLD: 2.86 K/UL (ref 1.5–8.1)
NRBC BLD-RTO: 0 PER 100 WBC
PLATELET # BLD AUTO: 385 K/UL (ref 138–453)
PMV BLD AUTO: 10.9 FL (ref 8.1–13.5)
POTASSIUM SERPL-SCNC: 4.3 MMOL/L (ref 3.7–5.3)
RBC # BLD AUTO: 3.67 M/UL (ref 3.95–5.11)
RBC # BLD: ABNORMAL 10*6/UL
SODIUM SERPL-SCNC: 141 MMOL/L (ref 135–144)
WBC OTHER # BLD: 5.2 K/UL (ref 3.5–11.3)

## 2023-12-24 PROCEDURE — 36415 COLL VENOUS BLD VENIPUNCTURE: CPT

## 2023-12-24 PROCEDURE — 82947 ASSAY GLUCOSE BLOOD QUANT: CPT

## 2023-12-24 PROCEDURE — 6370000000 HC RX 637 (ALT 250 FOR IP): Performed by: STUDENT IN AN ORGANIZED HEALTH CARE EDUCATION/TRAINING PROGRAM

## 2023-12-24 PROCEDURE — C9113 INJ PANTOPRAZOLE SODIUM, VIA: HCPCS | Performed by: HOSPITALIST

## 2023-12-24 PROCEDURE — 6360000002 HC RX W HCPCS: Performed by: HOSPITALIST

## 2023-12-24 PROCEDURE — 99232 SBSQ HOSP IP/OBS MODERATE 35: CPT | Performed by: STUDENT IN AN ORGANIZED HEALTH CARE EDUCATION/TRAINING PROGRAM

## 2023-12-24 PROCEDURE — 6360000002 HC RX W HCPCS: Performed by: NURSE PRACTITIONER

## 2023-12-24 PROCEDURE — 2060000000 HC ICU INTERMEDIATE R&B

## 2023-12-24 PROCEDURE — 6360000002 HC RX W HCPCS: Performed by: STUDENT IN AN ORGANIZED HEALTH CARE EDUCATION/TRAINING PROGRAM

## 2023-12-24 PROCEDURE — 85025 COMPLETE CBC W/AUTO DIFF WBC: CPT

## 2023-12-24 PROCEDURE — 80048 BASIC METABOLIC PNL TOTAL CA: CPT

## 2023-12-24 PROCEDURE — 86140 C-REACTIVE PROTEIN: CPT

## 2023-12-24 PROCEDURE — 6370000000 HC RX 637 (ALT 250 FOR IP)

## 2023-12-24 PROCEDURE — 2580000003 HC RX 258: Performed by: HOSPITALIST

## 2023-12-24 PROCEDURE — 6370000000 HC RX 637 (ALT 250 FOR IP): Performed by: HOSPITALIST

## 2023-12-24 RX ORDER — PANTOPRAZOLE SODIUM 40 MG/1
40 TABLET, DELAYED RELEASE ORAL
Status: DISCONTINUED | OUTPATIENT
Start: 2023-12-25 | End: 2023-12-26 | Stop reason: HOSPADM

## 2023-12-24 RX ORDER — SODIUM HYPOCHLORITE 2.5 MG/ML
SOLUTION TOPICAL DAILY
Status: DISCONTINUED | OUTPATIENT
Start: 2023-12-24 | End: 2023-12-26 | Stop reason: HOSPADM

## 2023-12-24 RX ADMIN — HYOSCYAMINE SULFATE: 16 SOLUTION at 12:00

## 2023-12-24 RX ADMIN — ANTI-FUNGAL POWDER MICONAZOLE NITRATE TALC FREE: 1.42 POWDER TOPICAL at 19:58

## 2023-12-24 RX ADMIN — OXYCODONE AND ACETAMINOPHEN 2 TABLET: 5; 325 TABLET ORAL at 10:13

## 2023-12-24 RX ADMIN — DILTIAZEM HYDROCHLORIDE 120 MG: 120 CAPSULE, COATED, EXTENDED RELEASE ORAL at 08:49

## 2023-12-24 RX ADMIN — MORPHINE SULFATE 1 MG: 2 INJECTION, SOLUTION INTRAMUSCULAR; INTRAVENOUS at 12:40

## 2023-12-24 RX ADMIN — FONDAPARINUX SODIUM 10 MG: 5 INJECTION, SOLUTION SUBCUTANEOUS at 08:49

## 2023-12-24 RX ADMIN — FLUOXETINE HYDROCHLORIDE 40 MG: 20 CAPSULE ORAL at 08:49

## 2023-12-24 RX ADMIN — METOPROLOL TARTRATE 25 MG: 25 TABLET ORAL at 08:49

## 2023-12-24 RX ADMIN — SODIUM CHLORIDE: 9 INJECTION, SOLUTION INTRAVENOUS at 08:43

## 2023-12-24 RX ADMIN — OXYCODONE AND ACETAMINOPHEN 2 TABLET: 5; 325 TABLET ORAL at 01:42

## 2023-12-24 RX ADMIN — SODIUM CHLORIDE, PRESERVATIVE FREE 10 ML: 5 INJECTION INTRAVENOUS at 08:48

## 2023-12-24 RX ADMIN — SODIUM CHLORIDE, PRESERVATIVE FREE 10 ML: 5 INJECTION INTRAVENOUS at 19:56

## 2023-12-24 RX ADMIN — MORPHINE SULFATE 1 MG: 2 INJECTION, SOLUTION INTRAMUSCULAR; INTRAVENOUS at 21:12

## 2023-12-24 RX ADMIN — MORPHINE SULFATE 1 MG: 2 INJECTION, SOLUTION INTRAMUSCULAR; INTRAVENOUS at 03:56

## 2023-12-24 RX ADMIN — Medication 2000 MG: at 08:55

## 2023-12-24 RX ADMIN — OXYCODONE AND ACETAMINOPHEN 2 TABLET: 5; 325 TABLET ORAL at 19:53

## 2023-12-24 RX ADMIN — MORPHINE SULFATE 1 MG: 2 INJECTION, SOLUTION INTRAMUSCULAR; INTRAVENOUS at 08:39

## 2023-12-24 RX ADMIN — OXYCODONE AND ACETAMINOPHEN 2 TABLET: 5; 325 TABLET ORAL at 14:51

## 2023-12-24 RX ADMIN — MORPHINE SULFATE 1 MG: 2 INJECTION, SOLUTION INTRAMUSCULAR; INTRAVENOUS at 16:42

## 2023-12-24 RX ADMIN — Medication 2000 MG: at 16:42

## 2023-12-24 RX ADMIN — PANTOPRAZOLE SODIUM 40 MG: 40 INJECTION, POWDER, FOR SOLUTION INTRAVENOUS at 08:48

## 2023-12-24 RX ADMIN — INSULIN GLARGINE 10 UNITS: 100 INJECTION, SOLUTION SUBCUTANEOUS at 19:57

## 2023-12-24 RX ADMIN — BUPROPION HYDROCHLORIDE 150 MG: 150 TABLET, EXTENDED RELEASE ORAL at 08:49

## 2023-12-24 RX ADMIN — OXYCODONE AND ACETAMINOPHEN 2 TABLET: 5; 325 TABLET ORAL at 06:06

## 2023-12-24 RX ADMIN — Medication 2000 MG: at 01:41

## 2023-12-24 RX ADMIN — ANTI-FUNGAL POWDER MICONAZOLE NITRATE TALC FREE: 1.42 POWDER TOPICAL at 08:48

## 2023-12-24 RX ADMIN — ALTEPLASE 1 MG: 2.2 INJECTION, POWDER, LYOPHILIZED, FOR SOLUTION INTRAVENOUS at 14:45

## 2023-12-24 NOTE — PLAN OF CARE
Problem: Discharge Planning  Goal: Discharge to home or other facility with appropriate resources  Outcome: Progressing     Problem: Pain  Goal: Verbalizes/displays adequate comfort level or baseline comfort level  Outcome: Progressing     Problem: Skin/Tissue Integrity  Goal: Absence of new skin breakdown  Description: 1. Monitor for areas of redness and/or skin breakdown  2. Assess vascular access sites hourly  3. Every 4-6 hours minimum:  Change oxygen saturation probe site  4. Every 4-6 hours:  If on nasal continuous positive airway pressure, respiratory therapy assess nares and determine need for appliance change or resting period.   Outcome: Progressing     Problem: Safety - Adult  Goal: Free from fall injury  Outcome: Progressing  Flowsheets (Taken 12/23/2023 1600)  Free From Fall Injury: Instruct family/caregiver on patient safety     Problem: ABCDS Injury Assessment  Goal: Absence of physical injury  Outcome: Progressing  Flowsheets (Taken 12/23/2023 1600)  Absence of Physical Injury: Implement safety measures based on patient assessment

## 2023-12-24 NOTE — PLAN OF CARE
Orthopedic Surgery Plan of Care Note    Dede Grullon  : 1980  MRN: [id number]      Times over the past 24 hours to transfer the patient to tertiary center for higher level plastic surgery care has been unsuccessful thus far. Transfer clinic denies transfer. [facility] currently does not except patient's [facility]. Currently working towards transfer to [facility] in [facility] pending acceptance this kesha Funez MD  Orthopedic Surgery Resident, PGY-2  [address]  [place], [place]

## 2023-12-24 NOTE — PLAN OF CARE
Patient:  Shruti Viera  YOB: 1980     39 y.o. female    Orthopedic post-operative instructions    Shruti Viera is a 39 y.o. female who is being seen for:    - Left hip surgical site dehiscence. Surgical History:     L hip JODEE: 7/19/23  Irrigation and Debridement x 5: 8/21-11/30/23.       -Pt has been accepted for transfer to [facility] on 12/26/23 for further care with orthopedics and plastic surgery. - Wet to dry dressings changes with Dakins solution daily. - Soft dressings on left hip . Please maintain and keep clean. Reinforce dressings as needed per nursing.  - WBAT  to the left lower extremity.  - Abx: Rifampin, Ancef. Per ID recs. - Diet: Ok for Adult diet from ortho perspective   - DVT ppx: Fondaparinux, per primary.   - Pain control and medical management per primary  - Ice and elevate extremity for pain and swelling  - Follow up with Dr. Nancy Pollard upon return from [facility].  Please call [phone] to schedule/with any questions.   -Please contact Ortho on call with any questions    Kenia Knowles DO  Orthopedic Surgery Resident, PGY-1  [address] [place] [address]

## 2023-12-24 NOTE — PLAN OF CARE
Problem: Discharge Planning  Goal: Discharge to home or other facility with appropriate resources  12/24/2023 0857 by Guy Alarcon RN  Outcome: Progressing  12/23/2023 2039 by Isabell Brizuela RN  Outcome: Progressing     Problem: Pain  Goal: Verbalizes/displays adequate comfort level or baseline comfort level  12/24/2023 0857 by Guy Alarcon RN  Outcome: Progressing  12/23/2023 2039 by Isabell Brizuela RN  Outcome: Progressing     Problem: Skin/Tissue Integrity  Goal: Absence of new skin breakdown  12/24/2023 0857 by Guy Alarcon RN  Outcome: Progressing  12/23/2023 2039 by Isabell Brizuela RN  Outcome: Progressing     Problem: Safety - Adult  Goal: Free from fall injury  12/24/2023 0857 by Guy Alarcon RN  Outcome: Progressing  12/23/2023 2039 by Isabell Brizuela RN  Outcome: Progressing  Flowsheets (Taken 12/23/2023 1600)  Free From Fall Injury: Instruct family/caregiver on patient safety     Problem: ABCDS Injury Assessment  Goal: Absence of physical injury  12/24/2023 0857 by Guy Alarcon RN  Outcome: Progressing  12/23/2023 2039 by Isabell Brizuela RN  Outcome: Progressing  Flowsheets (Taken 12/23/2023 1600)  Absence of Physical Injury: Implement safety measures based on patient assessment

## 2023-12-24 NOTE — PLAN OF CARE
Problem: Discharge Planning  Goal: Discharge to home or other facility with appropriate resources  12/24/2023 1749 by Forrest Suazo RN  Outcome: Progressing  12/24/2023 0857 by Bonnielee Aschoff, RN  Outcome: Progressing     Problem: Pain  Goal: Verbalizes/displays adequate comfort level or baseline comfort level  12/24/2023 1749 by Forrest Suazo RN  Outcome: Progressing  12/24/2023 0857 by Bonnielee Aschoff, RN  Outcome: Progressing     Problem: Skin/Tissue Integrity  Goal: Absence of new skin breakdown  Description: 1. Monitor for areas of redness and/or skin breakdown  2. Assess vascular access sites hourly  3. Every 4-6 hours minimum:  Change oxygen saturation probe site  4. Every 4-6 hours:  If on nasal continuous positive airway pressure, respiratory therapy assess nares and determine need for appliance change or resting period.   12/24/2023 1749 by Forrest Suazo RN  Outcome: Progressing  12/24/2023 0857 by Bonnielee Aschoff, RN  Outcome: Progressing     Problem: Safety - Adult  Goal: Free from fall injury  12/24/2023 1749 by Forrest Suazo RN  Outcome: Progressing  12/24/2023 0857 by Bonnielee Aschoff, RN  Outcome: Progressing     Problem: ABCDS Injury Assessment  Goal: Absence of physical injury  12/24/2023 1749 by Forrest Suazo RN  Outcome: Progressing  12/24/2023 0857 by Bonnielee Aschoff, RN  Outcome: Progressing     Problem: Chronic Conditions and Co-morbidities  Goal: Patient's chronic conditions and co-morbidity symptoms are monitored and maintained or improved  Outcome: Progressing

## 2023-12-25 LAB
ANION GAP SERPL CALCULATED.3IONS-SCNC: 8 MMOL/L (ref 9–17)
BASOPHILS # BLD: <0.03 K/UL (ref 0–0.2)
BASOPHILS NFR BLD: 0 % (ref 0–2)
BUN SERPL-MCNC: 9 MG/DL (ref 6–20)
CALCIUM SERPL-MCNC: 8.3 MG/DL (ref 8.6–10.4)
CHLORIDE SERPL-SCNC: 107 MMOL/L (ref 98–107)
CO2 SERPL-SCNC: 25 MMOL/L (ref 20–31)
CREAT SERPL-MCNC: 0.3 MG/DL (ref 0.5–0.9)
EOSINOPHIL # BLD: 0.15 K/UL (ref 0–0.44)
EOSINOPHILS RELATIVE PERCENT: 3 % (ref 1–4)
ERYTHROCYTE [DISTWIDTH] IN BLOOD BY AUTOMATED COUNT: 15.4 % (ref 11.8–14.4)
GFR SERPL CREATININE-BSD FRML MDRD: >60 ML/MIN/1.73M2
GLUCOSE SERPL-MCNC: 207 MG/DL (ref 70–99)
HCT VFR BLD AUTO: 35.9 % (ref 36.3–47.1)
HGB BLD-MCNC: 10.6 G/DL (ref 11.9–15.1)
IMM GRANULOCYTES # BLD AUTO: <0.03 K/UL (ref 0–0.3)
IMM GRANULOCYTES NFR BLD: 0 %
LYMPHOCYTES NFR BLD: 1.3 K/UL (ref 1.1–3.7)
LYMPHOCYTES RELATIVE PERCENT: 24 % (ref 24–43)
MCH RBC QN AUTO: 28.6 PG (ref 25.2–33.5)
MCHC RBC AUTO-ENTMCNC: 29.5 G/DL (ref 28.4–34.8)
MCV RBC AUTO: 97 FL (ref 82.6–102.9)
MONOCYTES NFR BLD: 0.5 K/UL (ref 0.1–1.2)
MONOCYTES NFR BLD: 9 % (ref 3–12)
NEUTROPHILS NFR BLD: 64 % (ref 36–65)
NEUTS SEG NFR BLD: 3.5 K/UL (ref 1.5–8.1)
NRBC BLD-RTO: 0 PER 100 WBC
PLATELET # BLD AUTO: 316 K/UL (ref 138–453)
PMV BLD AUTO: 12.3 FL (ref 8.1–13.5)
POTASSIUM SERPL-SCNC: 4 MMOL/L (ref 3.7–5.3)
RBC # BLD AUTO: 3.7 M/UL (ref 3.95–5.11)
RBC # BLD: ABNORMAL 10*6/UL
SODIUM SERPL-SCNC: 140 MMOL/L (ref 135–144)
WBC OTHER # BLD: 5.5 K/UL (ref 3.5–11.3)

## 2023-12-25 PROCEDURE — 6370000000 HC RX 637 (ALT 250 FOR IP): Performed by: STUDENT IN AN ORGANIZED HEALTH CARE EDUCATION/TRAINING PROGRAM

## 2023-12-25 PROCEDURE — 2580000003 HC RX 258: Performed by: HOSPITALIST

## 2023-12-25 PROCEDURE — 85025 COMPLETE CBC W/AUTO DIFF WBC: CPT

## 2023-12-25 PROCEDURE — 6360000002 HC RX W HCPCS: Performed by: STUDENT IN AN ORGANIZED HEALTH CARE EDUCATION/TRAINING PROGRAM

## 2023-12-25 PROCEDURE — 6370000000 HC RX 637 (ALT 250 FOR IP): Performed by: HOSPITALIST

## 2023-12-25 PROCEDURE — 36415 COLL VENOUS BLD VENIPUNCTURE: CPT

## 2023-12-25 PROCEDURE — 6360000002 HC RX W HCPCS: Performed by: NURSE PRACTITIONER

## 2023-12-25 PROCEDURE — 6360000002 HC RX W HCPCS: Performed by: HOSPITALIST

## 2023-12-25 PROCEDURE — 2060000000 HC ICU INTERMEDIATE R&B

## 2023-12-25 PROCEDURE — 99232 SBSQ HOSP IP/OBS MODERATE 35: CPT | Performed by: STUDENT IN AN ORGANIZED HEALTH CARE EDUCATION/TRAINING PROGRAM

## 2023-12-25 PROCEDURE — 80048 BASIC METABOLIC PNL TOTAL CA: CPT

## 2023-12-25 RX ADMIN — MORPHINE SULFATE 1 MG: 2 INJECTION, SOLUTION INTRAMUSCULAR; INTRAVENOUS at 14:00

## 2023-12-25 RX ADMIN — DILTIAZEM HYDROCHLORIDE 120 MG: 120 CAPSULE, COATED, EXTENDED RELEASE ORAL at 08:58

## 2023-12-25 RX ADMIN — FONDAPARINUX SODIUM 10 MG: 5 INJECTION, SOLUTION SUBCUTANEOUS at 09:01

## 2023-12-25 RX ADMIN — Medication 2000 MG: at 09:02

## 2023-12-25 RX ADMIN — OXYCODONE AND ACETAMINOPHEN 2 TABLET: 5; 325 TABLET ORAL at 23:25

## 2023-12-25 RX ADMIN — MORPHINE SULFATE 1 MG: 2 INJECTION, SOLUTION INTRAMUSCULAR; INTRAVENOUS at 01:32

## 2023-12-25 RX ADMIN — ANTI-FUNGAL POWDER MICONAZOLE NITRATE TALC FREE: 1.42 POWDER TOPICAL at 21:28

## 2023-12-25 RX ADMIN — MORPHINE SULFATE 1 MG: 2 INJECTION, SOLUTION INTRAMUSCULAR; INTRAVENOUS at 10:10

## 2023-12-25 RX ADMIN — PANTOPRAZOLE SODIUM 40 MG: 40 TABLET, DELAYED RELEASE ORAL at 09:01

## 2023-12-25 RX ADMIN — SODIUM CHLORIDE, PRESERVATIVE FREE 10 ML: 5 INJECTION INTRAVENOUS at 09:02

## 2023-12-25 RX ADMIN — Medication 2000 MG: at 01:30

## 2023-12-25 RX ADMIN — ANTI-FUNGAL POWDER MICONAZOLE NITRATE TALC FREE: 1.42 POWDER TOPICAL at 09:03

## 2023-12-25 RX ADMIN — OXYCODONE AND ACETAMINOPHEN 2 TABLET: 5; 325 TABLET ORAL at 04:42

## 2023-12-25 RX ADMIN — ACETAMINOPHEN 650 MG: 325 TABLET ORAL at 19:55

## 2023-12-25 RX ADMIN — HYOSCYAMINE SULFATE: 16 SOLUTION at 09:03

## 2023-12-25 RX ADMIN — OXYCODONE AND ACETAMINOPHEN 2 TABLET: 5; 325 TABLET ORAL at 08:55

## 2023-12-25 RX ADMIN — BUPROPION HYDROCHLORIDE 150 MG: 150 TABLET, EXTENDED RELEASE ORAL at 08:58

## 2023-12-25 RX ADMIN — OXYCODONE AND ACETAMINOPHEN 2 TABLET: 5; 325 TABLET ORAL at 16:55

## 2023-12-25 RX ADMIN — MORPHINE SULFATE 1 MG: 2 INJECTION, SOLUTION INTRAMUSCULAR; INTRAVENOUS at 05:55

## 2023-12-25 RX ADMIN — Medication 2000 MG: at 16:59

## 2023-12-25 RX ADMIN — HYDROMORPHONE HYDROCHLORIDE 1 MG: 2 TABLET ORAL at 15:02

## 2023-12-25 RX ADMIN — HYDROMORPHONE HYDROCHLORIDE 1 MG: 2 TABLET ORAL at 21:13

## 2023-12-25 RX ADMIN — FLUOXETINE HYDROCHLORIDE 40 MG: 20 CAPSULE ORAL at 09:01

## 2023-12-25 RX ADMIN — OXYCODONE AND ACETAMINOPHEN 2 TABLET: 5; 325 TABLET ORAL at 00:15

## 2023-12-25 RX ADMIN — OXYCODONE AND ACETAMINOPHEN 2 TABLET: 5; 325 TABLET ORAL at 12:49

## 2023-12-25 RX ADMIN — SODIUM CHLORIDE, PRESERVATIVE FREE 10 ML: 5 INJECTION INTRAVENOUS at 21:29

## 2023-12-25 RX ADMIN — MORPHINE SULFATE 1 MG: 2 INJECTION, SOLUTION INTRAMUSCULAR; INTRAVENOUS at 18:21

## 2023-12-25 RX ADMIN — MORPHINE SULFATE 1 MG: 2 INJECTION, SOLUTION INTRAMUSCULAR; INTRAVENOUS at 22:05

## 2023-12-25 NOTE — PLAN OF CARE
Problem: Discharge Planning  Goal: Discharge to home or other facility with appropriate resources  12/24/2023 2158 by Gosia Colon RN  Outcome: Progressing  12/24/2023 1749 by Viviana Small RN  Outcome: Progressing  12/24/2023 0857 by Jean Marie Prince RN  Outcome: Progressing     Problem: Pain  Goal: Verbalizes/displays adequate comfort level or baseline comfort level  12/24/2023 2158 by Gosia Colon RN  Outcome: Progressing  12/24/2023 1749 by Viviana Small RN  Outcome: Progressing  12/24/2023 0857 by Jean Marie Prince RN  Outcome: Progressing     Problem: Skin/Tissue Integrity  Goal: Absence of new skin breakdown  Description: 1. Monitor for areas of redness and/or skin breakdown  2. Assess vascular access sites hourly  3. Every 4-6 hours minimum:  Change oxygen saturation probe site  4. Every 4-6 hours:  If on nasal continuous positive airway pressure, respiratory therapy assess nares and determine need for appliance change or resting period.   12/24/2023 2158 by Gosia Colon RN  Outcome: Progressing  12/24/2023 1749 by Viviana Small RN  Outcome: Progressing  12/24/2023 0857 by Jean Marie Prince RN  Outcome: Progressing     Problem: Safety - Adult  Goal: Free from fall injury  12/24/2023 2158 by Gosia Colon RN  Outcome: Progressing  12/24/2023 1749 by Viviana Small RN  Outcome: Progressing  12/24/2023 0857 by Jean Marie Prince RN  Outcome: Progressing     Problem: ABCDS Injury Assessment  Goal: Absence of physical injury  12/24/2023 2158 by Gosia Colon RN  Outcome: Progressing  12/24/2023 1749 by Viviana Small RN  Outcome: Progressing  12/24/2023 0857 by Jean Marie Prince RN  Outcome: Progressing     Problem: Chronic Conditions and Co-morbidities  Goal: Patient's chronic conditions and co-morbidity symptoms are monitored and maintained or improved  12/24/2023 2158 by Gosia Colon RN  Outcome: Progressing  12/24/2023 1749 by Viviana Small RN  Outcome: Progressing

## 2023-12-26 VITALS
RESPIRATION RATE: 18 BRPM | TEMPERATURE: 98.5 F | DIASTOLIC BLOOD PRESSURE: 51 MMHG | BODY MASS INDEX: 32.38 KG/M2 | SYSTOLIC BLOOD PRESSURE: 107 MMHG | HEIGHT: 68 IN | WEIGHT: 213.63 LBS | HEART RATE: 89 BPM | OXYGEN SATURATION: 99 %

## 2023-12-26 LAB
ANION GAP SERPL CALCULATED.3IONS-SCNC: 6 MMOL/L (ref 9–17)
BASOPHILS # BLD: 0.03 K/UL (ref 0–0.2)
BASOPHILS NFR BLD: 1 % (ref 0–2)
BUN SERPL-MCNC: 11 MG/DL (ref 6–20)
CALCIUM SERPL-MCNC: 8.5 MG/DL (ref 8.6–10.4)
CHLORIDE SERPL-SCNC: 107 MMOL/L (ref 98–107)
CO2 SERPL-SCNC: 26 MMOL/L (ref 20–31)
CREAT SERPL-MCNC: 0.4 MG/DL (ref 0.5–0.9)
EOSINOPHIL # BLD: 0.19 K/UL (ref 0–0.44)
EOSINOPHILS RELATIVE PERCENT: 4 % (ref 1–4)
ERYTHROCYTE [DISTWIDTH] IN BLOOD BY AUTOMATED COUNT: 15.1 % (ref 11.8–14.4)
GFR SERPL CREATININE-BSD FRML MDRD: >60 ML/MIN/1.73M2
GLUCOSE BLD-MCNC: 163 MG/DL (ref 65–105)
GLUCOSE SERPL-MCNC: 136 MG/DL (ref 70–99)
HCT VFR BLD AUTO: 34.5 % (ref 36.3–47.1)
HGB BLD-MCNC: 10.3 G/DL (ref 11.9–15.1)
IMM GRANULOCYTES # BLD AUTO: <0.03 K/UL (ref 0–0.3)
IMM GRANULOCYTES NFR BLD: 0 %
LYMPHOCYTES NFR BLD: 1.46 K/UL (ref 1.1–3.7)
LYMPHOCYTES RELATIVE PERCENT: 27 % (ref 24–43)
MCH RBC QN AUTO: 28.9 PG (ref 25.2–33.5)
MCHC RBC AUTO-ENTMCNC: 29.9 G/DL (ref 28.4–34.8)
MCV RBC AUTO: 96.6 FL (ref 82.6–102.9)
MONOCYTES NFR BLD: 0.42 K/UL (ref 0.1–1.2)
MONOCYTES NFR BLD: 8 % (ref 3–12)
NEUTROPHILS NFR BLD: 60 % (ref 36–65)
NEUTS SEG NFR BLD: 3.26 K/UL (ref 1.5–8.1)
NRBC BLD-RTO: 0 PER 100 WBC
PLATELET # BLD AUTO: 342 K/UL (ref 138–453)
PMV BLD AUTO: 11.4 FL (ref 8.1–13.5)
POTASSIUM SERPL-SCNC: 4.4 MMOL/L (ref 3.7–5.3)
RBC # BLD AUTO: 3.57 M/UL (ref 3.95–5.11)
RBC # BLD: ABNORMAL 10*6/UL
SODIUM SERPL-SCNC: 139 MMOL/L (ref 135–144)
WBC OTHER # BLD: 5.4 K/UL (ref 3.5–11.3)

## 2023-12-26 PROCEDURE — 6360000002 HC RX W HCPCS: Performed by: STUDENT IN AN ORGANIZED HEALTH CARE EDUCATION/TRAINING PROGRAM

## 2023-12-26 PROCEDURE — 6370000000 HC RX 637 (ALT 250 FOR IP): Performed by: HOSPITALIST

## 2023-12-26 PROCEDURE — 6370000000 HC RX 637 (ALT 250 FOR IP): Performed by: STUDENT IN AN ORGANIZED HEALTH CARE EDUCATION/TRAINING PROGRAM

## 2023-12-26 PROCEDURE — 36415 COLL VENOUS BLD VENIPUNCTURE: CPT

## 2023-12-26 PROCEDURE — 6360000002 HC RX W HCPCS: Performed by: HOSPITALIST

## 2023-12-26 PROCEDURE — 6360000002 HC RX W HCPCS: Performed by: NURSE PRACTITIONER

## 2023-12-26 PROCEDURE — 80048 BASIC METABOLIC PNL TOTAL CA: CPT

## 2023-12-26 PROCEDURE — 2580000003 HC RX 258: Performed by: HOSPITALIST

## 2023-12-26 PROCEDURE — 85025 COMPLETE CBC W/AUTO DIFF WBC: CPT

## 2023-12-26 PROCEDURE — 82947 ASSAY GLUCOSE BLOOD QUANT: CPT

## 2023-12-26 PROCEDURE — 99239 HOSP IP/OBS DSCHRG MGMT >30: CPT | Performed by: STUDENT IN AN ORGANIZED HEALTH CARE EDUCATION/TRAINING PROGRAM

## 2023-12-26 RX ADMIN — MORPHINE SULFATE 1 MG: 2 INJECTION, SOLUTION INTRAMUSCULAR; INTRAVENOUS at 01:50

## 2023-12-26 RX ADMIN — INSULIN GLARGINE 10 UNITS: 100 INJECTION, SOLUTION SUBCUTANEOUS at 01:24

## 2023-12-26 RX ADMIN — DILTIAZEM HYDROCHLORIDE 120 MG: 120 CAPSULE, COATED, EXTENDED RELEASE ORAL at 08:48

## 2023-12-26 RX ADMIN — HYOSCYAMINE SULFATE: 16 SOLUTION at 09:19

## 2023-12-26 RX ADMIN — BUPROPION HYDROCHLORIDE 150 MG: 150 TABLET, EXTENDED RELEASE ORAL at 08:48

## 2023-12-26 RX ADMIN — FLUOXETINE HYDROCHLORIDE 40 MG: 20 CAPSULE ORAL at 08:47

## 2023-12-26 RX ADMIN — ANTI-FUNGAL POWDER MICONAZOLE NITRATE TALC FREE: 1.42 POWDER TOPICAL at 09:19

## 2023-12-26 RX ADMIN — MORPHINE SULFATE 1 MG: 2 INJECTION, SOLUTION INTRAMUSCULAR; INTRAVENOUS at 06:23

## 2023-12-26 RX ADMIN — HYDROMORPHONE HYDROCHLORIDE 1 MG: 2 TABLET ORAL at 09:12

## 2023-12-26 RX ADMIN — MORPHINE SULFATE 1 MG: 2 INJECTION, SOLUTION INTRAMUSCULAR; INTRAVENOUS at 11:12

## 2023-12-26 RX ADMIN — PANTOPRAZOLE SODIUM 40 MG: 40 TABLET, DELAYED RELEASE ORAL at 08:48

## 2023-12-26 RX ADMIN — METOPROLOL TARTRATE 25 MG: 25 TABLET ORAL at 08:47

## 2023-12-26 RX ADMIN — OXYCODONE AND ACETAMINOPHEN 2 TABLET: 5; 325 TABLET ORAL at 02:56

## 2023-12-26 RX ADMIN — SODIUM CHLORIDE, PRESERVATIVE FREE 10 ML: 5 INJECTION INTRAVENOUS at 08:51

## 2023-12-26 RX ADMIN — Medication 2000 MG: at 08:48

## 2023-12-26 RX ADMIN — Medication 2000 MG: at 02:57

## 2023-12-26 RX ADMIN — FONDAPARINUX SODIUM 10 MG: 5 INJECTION, SOLUTION SUBCUTANEOUS at 08:48

## 2023-12-26 NOTE — CARE COORDINATION
12/23/23 1334   Readmission Assessment   Number of Days since last admission? 8-30 days   Previous Disposition SNF   Who is being Interviewed Patient   What was the patient's/caregiver's perception as to why they think they needed to return back to the hospital? Did not realize care needs would be so extensive; Other (Comment)  (wound complications)   Did you visit your Primary Care Physician after you left the hospital, before you returned this time? No   Why weren't you able to visit your PCP? Did not have an appointment   Did you see a specialist, such as Cardiac, Pulmonary, Orthopedic Physician, etc. after you left the hospital? Yes   Who advised the patient to return to the hospital? Physician's Nurse/Office staff; Other (Comment)  (wound care)   Does the patient report anything that got in the way of taking their medications? No   In our efforts to provide the best possible care to you and others like you, can you think of anything that we could have done to help you after you left the hospital the first time, so that you might not have needed to return so soon? Additional Community resources available for illness support; Other (Comment)  (transfer to higher level of care)
Received call from [facility] with [address] requesting transport documents to [facility]. Dr Malaika Martínez on unit and confirmed plan is to  [facility] [address] contacted [facility] and spoke with Nehal Roberts ([phone]) to confirm and obtain address [address], [place], [address].     0184  Certificate of Necessity faxed to [facility] in radiology file room, she will transfer recent files to [facility].     1010 transport pack and latest clinicals printed and placed in blue envelope. Delfino Pascual on campus and will transport pt soon, per Saad Cummings.      [address]  Discharge [address] [facility] Case Management Department  Written by: Ashok Burgos    Patient Name: [facility]  Attending Provider: Basil David MD  Admit Date: 2023  6:50 PM  MRN: [id number]  Account: [de-identified]                     : 1980  Discharge Date:       Disposition: [facility] per [address]
Transitional planning: Per ortho note pt has been accepted for transfer to [facility] on 12/26/23 for further care with orthopedics and plastic surgery. [facility] declined acceptance.
was provided to: (P) Patient   Patient Representative Name:       The Patient and/or Patient Representative Agree with the Discharge Plan?  (P) Yes    Nohemi Morgan RN  Case Management Department  Ph: [phone]

## 2023-12-26 NOTE — DISCHARGE SUMMARY
[facility]    [address]    Discharge Summary     Patient ID: Shruti Viera  :  1980   MRN: [id number]     ACCOUNT:  [de-identified]   Patient's PCP: Rudolm Goltz Date: 2023   Discharge Date: 2023   Length of Stay: 4  Code Status:  Prior  Admitting Physician: Reji Adorno DO  Discharge Physician: Ailyn Grant MD     Active Discharge Diagnoses:     Hospital Problem Lists:  Principal Problem:    Left hip postoperative wound infection  Active Problems:    Alcohol dependence in remission ([address])    History of DVT of lower extremity    GERD (gastroesophageal reflux disease)    Type 2 diabetes mellitus with diabetic polyneuropathy, with long-term current use of insulin ([address])    Essential hypertension    Bipolar affective disorder in remission ([address])    History of pulmonary embolism    Antiphospholipid syndrome (HCC)    Chronic post-traumatic stress disorder (PTSD)    OCD (obsessive compulsive disorder)    Severe benzodiazepine use disorder (HCC)    Fibromyalgia    Wound dehiscence    Postoperative infection    Chronic osteomyelitis of left femur with draining sinus ([address])    Hardware complicating wound infection ([address])    Methicillin susceptible Staphylococcus aureus infection  Resolved Problems:    * No resolved hospital problems.  *      Admission Condition:  stable     Discharged Condition: stable    Hospital Stay:     [address] [facility] [place] female well-known to our service who had multiple recent visits due to left hip prosthesis infection was sent from wound clinic due to wound dehiscence, no leukocytosis but with purulent discharge , orthopedic surgery was involved, plan to transfer to [facility] for further management, being discharged in stable condition to [facility] today      Significant therapeutic interventions: none    Significant Diagnostic Studies:   Labs / Micro:  CBC:   Lab Results   Component Value Date/Time

## 2023-12-26 NOTE — PLAN OF CARE
Problem: Discharge Planning  Goal: Discharge to home or other facility with appropriate resources  Outcome: Progressing     Problem: Pain  Goal: Verbalizes/displays adequate comfort level or baseline comfort level  Outcome: Progressing     Problem: Skin/Tissue Integrity  Goal: Absence of new skin breakdown  Description: 1. Monitor for areas of redness and/or skin breakdown  2. Assess vascular access sites hourly  3. Every 4-6 hours minimum:  Change oxygen saturation probe site  4. Every 4-6 hours:  If on nasal continuous positive airway pressure, respiratory therapy assess nares and determine need for appliance change or resting period.   Outcome: Progressing     Problem: Safety - Adult  Goal: Free from fall injury  Outcome: Progressing     Problem: ABCDS Injury Assessment  Goal: Absence of physical injury  Outcome: Progressing     Problem: Chronic Conditions and Co-morbidities  Goal: Patient's chronic conditions and co-morbidity symptoms are monitored and maintained or improved  Outcome: Progressing     Problem: Neurosensory - Adult  Goal: Achieves stable or improved neurological status  Outcome: Progressing  Goal: Achieves maximal functionality and self care  Outcome: Progressing     Problem: Skin/Tissue Integrity - Adult  Goal: Skin integrity remains intact  Outcome: Progressing  Flowsheets (Taken 12/25/2023 2191)  Skin Integrity Remains Intact: Monitor for areas of redness and/or skin breakdown  Goal: Incisions, wounds, or drain sites healing without S/S of infection  Outcome: Progressing     Problem: Musculoskeletal - Adult  Goal: Return mobility to safest level of function  Outcome: Progressing  Goal: Maintain proper alignment of affected body part  Outcome: Progressing  Goal: Return ADL status to a safe level of function  Outcome: Progressing     Problem: Gastrointestinal - Adult  Goal: Maintains or returns to baseline bowel function  Outcome: Progressing  Goal: Maintains adequate nutritional

## 2023-12-26 NOTE — PLAN OF CARE
Problem: Discharge Planning  Goal: Discharge to home or other facility with appropriate resources  Outcome: Adequate for Discharge     Problem: Pain  Goal: Verbalizes/displays adequate comfort level or baseline comfort level  Outcome: Adequate for Discharge  Flowsheets (Taken 12/26/2023 0350 by Esperanza Crabtree RN)  Verbalizes/displays adequate comfort level or baseline comfort level: Encourage patient to monitor pain and request assistance     Problem: Skin/Tissue Integrity  Goal: Absence of new skin breakdown  Description: 1. Monitor for areas of redness and/or skin breakdown  2. Assess vascular access sites hourly  3. Every 4-6 hours minimum:  Change oxygen saturation probe site  4. Every 4-6 hours:  If on nasal continuous positive airway pressure, respiratory therapy assess nares and determine need for appliance change or resting period.   Outcome: Adequate for Discharge     Problem: Safety - Adult  Goal: Free from fall injury  Outcome: Adequate for Discharge     Problem: ABCDS Injury Assessment  Goal: Absence of physical injury  Outcome: Adequate for Discharge     Problem: Chronic Conditions and Co-morbidities  Goal: Patient's chronic conditions and co-morbidity symptoms are monitored and maintained or improved  Outcome: Adequate for Discharge     Problem: Neurosensory - Adult  Goal: Achieves stable or improved neurological status  Outcome: Adequate for Discharge  Goal: Achieves maximal functionality and self care  Outcome: Adequate for Discharge     Problem: Skin/Tissue Integrity - Adult  Goal: Skin integrity remains intact  Outcome: Adequate for Discharge  Goal: Incisions, wounds, or drain sites healing without S/S of infection  Outcome: Adequate for Discharge     Problem: Musculoskeletal - Adult  Goal: Return mobility to safest level of function  Outcome: Adequate for Discharge  Goal: Maintain proper alignment of affected body part  Outcome: Adequate for Discharge  Goal: Return ADL status to a safe

## 2024-02-08 ENCOUNTER — HOSPITAL ENCOUNTER (OUTPATIENT)
Age: 44
Setting detail: SPECIMEN
Discharge: HOME | End: 2024-02-08

## 2024-02-08 LAB
ALBUMIN SERPL-MCNC: 1.5 G/DL (ref 3.5–5.2)
ALBUMIN/GLOB SERPL: 0.5 {RATIO} (ref 1–2.5)
ALP SERPL-CCNC: 100 U/L (ref 35–104)
ALT SERPL-CCNC: 11 U/L (ref 5–33)
ANION GAP SERPL CALCULATED.3IONS-SCNC: 8 MMOL/L (ref 9–17)
AST SERPL-CCNC: 10 U/L
BASOPHILS # BLD: 0.05 K/UL (ref 0–0.2)
BASOPHILS NFR BLD: 1 % (ref 0–2)
BILIRUB DIRECT SERPL-MCNC: <0.1 MG/DL
BILIRUB INDIRECT SERPL-MCNC: ABNORMAL MG/DL (ref 0–1)
BILIRUB SERPL-MCNC: <0.1 MG/DL (ref 0.3–1.2)
BUN SERPL-MCNC: 3 MG/DL (ref 6–20)
CALCIUM SERPL-MCNC: 7.2 MG/DL (ref 8.6–10.4)
CHLORIDE SERPL-SCNC: 109 MMOL/L (ref 98–107)
CK SERPL-CCNC: 14 U/L (ref 26–192)
CO2 SERPL-SCNC: 25 MMOL/L (ref 20–31)
CREAT SERPL-MCNC: 0.2 MG/DL (ref 0.5–0.9)
EOSINOPHIL # BLD: 0.18 K/UL (ref 0–0.44)
EOSINOPHILS RELATIVE PERCENT: 2 % (ref 1–4)
ERYTHROCYTE [DISTWIDTH] IN BLOOD BY AUTOMATED COUNT: 15.4 % (ref 11.8–14.4)
GFR SERPL CREATININE-BSD FRML MDRD: >60 ML/MIN/1.73M2
GLUCOSE SERPL-MCNC: 217 MG/DL (ref 70–99)
HCT VFR BLD AUTO: 26.5 % (ref 36.3–47.1)
HGB BLD-MCNC: 7.7 G/DL (ref 11.9–15.1)
IMM GRANULOCYTES # BLD AUTO: 0.06 K/UL (ref 0–0.3)
IMM GRANULOCYTES NFR BLD: 1 %
LYMPHOCYTES NFR BLD: 1.65 K/UL (ref 1.1–3.7)
LYMPHOCYTES RELATIVE PERCENT: 18 % (ref 24–43)
MCH RBC QN AUTO: 26.1 PG (ref 25.2–33.5)
MCHC RBC AUTO-ENTMCNC: 29.1 G/DL (ref 28.4–34.8)
MCV RBC AUTO: 89.8 FL (ref 82.6–102.9)
MONOCYTES NFR BLD: 0.84 K/UL (ref 0.1–1.2)
MONOCYTES NFR BLD: 9 % (ref 3–12)
NEUTROPHILS NFR BLD: 69 % (ref 36–65)
NEUTS SEG NFR BLD: 6.2 K/UL (ref 1.5–8.1)
NRBC BLD-RTO: 0 PER 100 WBC
PLATELET # BLD AUTO: 524 K/UL (ref 138–453)
PMV BLD AUTO: 11.1 FL (ref 8.1–13.5)
POTASSIUM SERPL-SCNC: 3.6 MMOL/L (ref 3.7–5.3)
PROT SERPL-MCNC: 4.3 G/DL (ref 6.4–8.3)
RBC # BLD AUTO: 2.95 M/UL (ref 3.95–5.11)
RBC # BLD: ABNORMAL 10*6/UL
SODIUM SERPL-SCNC: 142 MMOL/L (ref 135–144)
WBC OTHER # BLD: 9 K/UL (ref 3.5–11.3)

## 2024-02-08 PROCEDURE — 80048 BASIC METABOLIC PNL TOTAL CA: CPT

## 2024-02-08 PROCEDURE — 80076 HEPATIC FUNCTION PANEL: CPT

## 2024-02-08 PROCEDURE — 82550 ASSAY OF CK (CPK): CPT

## 2024-02-08 PROCEDURE — 85025 COMPLETE CBC W/AUTO DIFF WBC: CPT

## 2024-02-08 PROCEDURE — P9603 ONE-WAY ALLOW PRORATED MILES: HCPCS

## 2024-02-12 ENCOUNTER — HOSPITAL ENCOUNTER (OUTPATIENT)
Age: 44
Setting detail: SPECIMEN
Discharge: HOME | End: 2024-02-12
Payer: MEDICARE

## 2024-02-12 LAB
ANION GAP SERPL CALCULATED.3IONS-SCNC: 7 MMOL/L (ref 9–17)
BUN SERPL-MCNC: 6 MG/DL (ref 6–20)
CALCIUM SERPL-MCNC: 7.1 MG/DL (ref 8.6–10.4)
CHLORIDE SERPL-SCNC: 104 MMOL/L (ref 98–107)
CO2 SERPL-SCNC: 26 MMOL/L (ref 20–31)
CREAT SERPL-MCNC: 0.2 MG/DL (ref 0.5–0.9)
GFR SERPL CREATININE-BSD FRML MDRD: >60 ML/MIN/1.73M2
GLUCOSE SERPL-MCNC: 172 MG/DL (ref 70–99)
POTASSIUM SERPL-SCNC: 3.7 MMOL/L (ref 3.7–5.3)
SODIUM SERPL-SCNC: 137 MMOL/L (ref 135–144)

## 2024-02-12 PROCEDURE — 80048 BASIC METABOLIC PNL TOTAL CA: CPT

## 2024-02-12 PROCEDURE — P9603 ONE-WAY ALLOW PRORATED MILES: HCPCS

## 2024-02-13 NOTE — TELEPHONE ENCOUNTER
02/13/24 0756   Team Meeting   Meeting Type Daily Rounds   Initial Conference Date 02/13/24   Team Members Present   Team Members Present Physician;Nurse;;;Occupational Therapist   Physician Team Member Jenn Rene   Nursing Team Member July Muniz   Care Management Team Member Rossi   Patient/Family Present   Patient Present No   Patient's Family Present No     Patient is calm, cooperative, and medication compliant. Reports anxiety. Pamela GERONIMO coming to assess the patient on Friday 2/16/24 @ 10h for admission to their program. Trazodone to be put in standing 50mg. No pending discharge date at this time.     I refilled the prescription for albuterol only one time due to history of pneumonia. Now a second request comes that she needs 90-day supplies albuterol is not for 90 days, this is usually as needed, and to give 90 days only for COPD and asthma which are chronic conditions.   If she still short of breath, she needs an appointment and we will reevaluate her with chest x-ray and PFTs

## 2024-02-15 ENCOUNTER — HOSPITAL ENCOUNTER (OUTPATIENT)
Age: 44
Setting detail: SPECIMEN
Discharge: HOME | End: 2024-02-15

## 2024-02-15 LAB
ALBUMIN SERPL-MCNC: 1.6 G/DL (ref 3.5–5.2)
ALBUMIN/GLOB SERPL: 0.5 {RATIO} (ref 1–2.5)
ALP SERPL-CCNC: 121 U/L (ref 35–104)
ALT SERPL-CCNC: 10 U/L (ref 5–33)
ANION GAP SERPL CALCULATED.3IONS-SCNC: 8 MMOL/L (ref 9–17)
AST SERPL-CCNC: 11 U/L
BASOPHILS # BLD: 0.06 K/UL (ref 0–0.2)
BASOPHILS NFR BLD: 1 % (ref 0–2)
BILIRUB DIRECT SERPL-MCNC: <0.1 MG/DL
BILIRUB INDIRECT SERPL-MCNC: ABNORMAL MG/DL (ref 0–1)
BILIRUB SERPL-MCNC: <0.1 MG/DL (ref 0.3–1.2)
BUN SERPL-MCNC: 6 MG/DL (ref 6–20)
CALCIUM SERPL-MCNC: 7.9 MG/DL (ref 8.6–10.4)
CHLORIDE SERPL-SCNC: 105 MMOL/L (ref 98–107)
CK SERPL-CCNC: 15 U/L (ref 26–192)
CO2 SERPL-SCNC: 24 MMOL/L (ref 20–31)
CREAT SERPL-MCNC: 0.3 MG/DL (ref 0.5–0.9)
EOSINOPHIL # BLD: 0.32 K/UL (ref 0–0.44)
EOSINOPHILS RELATIVE PERCENT: 4 % (ref 1–4)
ERYTHROCYTE [DISTWIDTH] IN BLOOD BY AUTOMATED COUNT: 15.8 % (ref 11.8–14.4)
GFR SERPL CREATININE-BSD FRML MDRD: >60 ML/MIN/1.73M2
GLUCOSE SERPL-MCNC: 102 MG/DL (ref 70–99)
HCT VFR BLD AUTO: 29.3 % (ref 36.3–47.1)
HGB BLD-MCNC: 8.4 G/DL (ref 11.9–15.1)
IMM GRANULOCYTES # BLD AUTO: 0.05 K/UL (ref 0–0.3)
IMM GRANULOCYTES NFR BLD: 1 %
LYMPHOCYTES NFR BLD: 1.61 K/UL (ref 1.1–3.7)
LYMPHOCYTES RELATIVE PERCENT: 17 % (ref 24–43)
MCH RBC QN AUTO: 25.6 PG (ref 25.2–33.5)
MCHC RBC AUTO-ENTMCNC: 28.7 G/DL (ref 28.4–34.8)
MCV RBC AUTO: 89.3 FL (ref 82.6–102.9)
MONOCYTES NFR BLD: 0.77 K/UL (ref 0.1–1.2)
MONOCYTES NFR BLD: 8 % (ref 3–12)
NEUTROPHILS NFR BLD: 69 % (ref 36–65)
NEUTS SEG NFR BLD: 6.43 K/UL (ref 1.5–8.1)
NRBC BLD-RTO: 0 PER 100 WBC
PLATELET # BLD AUTO: 509 K/UL (ref 138–453)
PMV BLD AUTO: 11 FL (ref 8.1–13.5)
POTASSIUM SERPL-SCNC: 3.8 MMOL/L (ref 3.7–5.3)
PROT SERPL-MCNC: 4.9 G/DL (ref 6.4–8.3)
RBC # BLD AUTO: 3.28 M/UL (ref 3.95–5.11)
RBC # BLD: ABNORMAL 10*6/UL
WBC OTHER # BLD: 9.2 K/UL (ref 3.5–11.3)

## 2024-02-15 PROCEDURE — 80048 BASIC METABOLIC PNL TOTAL CA: CPT

## 2024-02-15 PROCEDURE — 82550 ASSAY OF CK (CPK): CPT

## 2024-02-15 PROCEDURE — P9603 ONE-WAY ALLOW PRORATED MILES: HCPCS

## 2024-02-15 PROCEDURE — 80076 HEPATIC FUNCTION PANEL: CPT

## 2024-02-15 PROCEDURE — 85025 COMPLETE CBC W/AUTO DIFF WBC: CPT

## 2024-02-19 ENCOUNTER — HOSPITAL ENCOUNTER (OUTPATIENT)
Age: 44
Setting detail: SPECIMEN
Discharge: HOME | End: 2024-02-19
Payer: MEDICARE

## 2024-02-19 LAB
ANION GAP SERPL CALCULATED.3IONS-SCNC: 8 MMOL/L (ref 9–16)
BUN SERPL-MCNC: 6 MG/DL (ref 6–20)
CALCIUM SERPL-MCNC: 8.2 MG/DL (ref 8.6–10.4)
CHLORIDE SERPL-SCNC: 105 MMOL/L (ref 98–107)
CO2 SERPL-SCNC: 25 MMOL/L (ref 20–31)
CREAT SERPL-MCNC: 0.3 MG/DL (ref 0.5–0.9)
GFR SERPL CREATININE-BSD FRML MDRD: >60 ML/MIN/1.73M2
GLUCOSE SERPL-MCNC: 118 MG/DL (ref 74–99)
POTASSIUM SERPL-SCNC: 4.5 MMOL/L (ref 3.7–5.3)
SODIUM SERPL-SCNC: 138 MMOL/L (ref 136–145)

## 2024-02-19 PROCEDURE — 36415 COLL VENOUS BLD VENIPUNCTURE: CPT

## 2024-02-19 PROCEDURE — 80048 BASIC METABOLIC PNL TOTAL CA: CPT

## 2024-02-19 PROCEDURE — P9603 ONE-WAY ALLOW PRORATED MILES: HCPCS

## 2024-02-22 ENCOUNTER — HOSPITAL ENCOUNTER (OUTPATIENT)
Age: 44
Setting detail: SPECIMEN
Discharge: HOME | End: 2024-02-22
Payer: MEDICARE

## 2024-02-22 LAB
ALBUMIN SERPL-MCNC: 2 G/DL (ref 3.5–5.2)
ALBUMIN/GLOB SERPL: 1 {RATIO} (ref 1–2.5)
ALP SERPL-CCNC: 110 U/L (ref 35–104)
ALT SERPL-CCNC: 9 U/L (ref 10–35)
ANION GAP SERPL CALCULATED.3IONS-SCNC: 7 MMOL/L (ref 9–16)
AST SERPL-CCNC: 16 U/L (ref 10–35)
BASOPHILS # BLD: 0.05 K/UL (ref 0–0.2)
BASOPHILS NFR BLD: 1 % (ref 0–2)
BILIRUB DIRECT SERPL-MCNC: <0.2 MG/DL (ref 0–0.3)
BILIRUB INDIRECT SERPL-MCNC: 0.1 MG/DL (ref 0–1)
BILIRUB SERPL-MCNC: <0.2 MG/DL (ref 0–1.2)
BUN SERPL-MCNC: 7 MG/DL (ref 6–20)
CALCIUM SERPL-MCNC: 8.1 MG/DL (ref 8.6–10.4)
CHLORIDE SERPL-SCNC: 106 MMOL/L (ref 98–107)
CK SERPL-CCNC: 15 U/L (ref 26–192)
CO2 SERPL-SCNC: 26 MMOL/L (ref 20–31)
CREAT SERPL-MCNC: 0.3 MG/DL (ref 0.5–0.9)
EOSINOPHIL # BLD: 0.39 K/UL (ref 0–0.44)
EOSINOPHILS RELATIVE PERCENT: 6 % (ref 1–4)
ERYTHROCYTE [DISTWIDTH] IN BLOOD BY AUTOMATED COUNT: 15.9 % (ref 11.8–14.4)
GFR SERPL CREATININE-BSD FRML MDRD: >60 ML/MIN/1.73M2
GLOBULIN SER CALC-MCNC: 2.8 G/DL
GLUCOSE SERPL-MCNC: 122 MG/DL (ref 74–99)
HCT VFR BLD AUTO: 28.3 % (ref 36.3–47.1)
HGB BLD-MCNC: 8.1 G/DL (ref 11.9–15.1)
IMM GRANULOCYTES # BLD AUTO: <0.03 K/UL (ref 0–0.3)
IMM GRANULOCYTES NFR BLD: 0 %
LYMPHOCYTES NFR BLD: 1.45 K/UL (ref 1.1–3.7)
LYMPHOCYTES RELATIVE PERCENT: 23 % (ref 24–43)
MCH RBC QN AUTO: 25.3 PG (ref 25.2–33.5)
MCHC RBC AUTO-ENTMCNC: 28.6 G/DL (ref 28.4–34.8)
MCV RBC AUTO: 88.4 FL (ref 82.6–102.9)
MONOCYTES NFR BLD: 0.58 K/UL (ref 0.1–1.2)
MONOCYTES NFR BLD: 9 % (ref 3–12)
NEUTROPHILS NFR BLD: 61 % (ref 36–65)
NEUTS SEG NFR BLD: 3.82 K/UL (ref 1.5–8.1)
NRBC BLD-RTO: 0 PER 100 WBC
PLATELET # BLD AUTO: 381 K/UL (ref 138–453)
PMV BLD AUTO: 11.8 FL (ref 8.1–13.5)
POTASSIUM SERPL-SCNC: 3.7 MMOL/L (ref 3.7–5.3)
PROT SERPL-MCNC: 4.8 G/DL (ref 6.6–8.7)
RBC # BLD AUTO: 3.2 M/UL (ref 3.95–5.11)
RBC # BLD: ABNORMAL 10*6/UL
SODIUM SERPL-SCNC: 139 MMOL/L (ref 136–145)
WBC OTHER # BLD: 6.3 K/UL (ref 3.5–11.3)

## 2024-02-22 PROCEDURE — 85025 COMPLETE CBC W/AUTO DIFF WBC: CPT

## 2024-02-22 PROCEDURE — 80076 HEPATIC FUNCTION PANEL: CPT

## 2024-02-22 PROCEDURE — 82550 ASSAY OF CK (CPK): CPT

## 2024-02-22 PROCEDURE — 80048 BASIC METABOLIC PNL TOTAL CA: CPT

## 2024-02-22 PROCEDURE — P9603 ONE-WAY ALLOW PRORATED MILES: HCPCS

## 2024-02-26 ENCOUNTER — HOSPITAL ENCOUNTER (OUTPATIENT)
Age: 44
Setting detail: SPECIMEN
Discharge: HOME | End: 2024-02-26
Payer: MEDICARE

## 2024-02-26 LAB
ANION GAP SERPL CALCULATED.3IONS-SCNC: 9 MMOL/L (ref 9–17)
BUN SERPL-MCNC: 7 MG/DL (ref 6–20)
CALCIUM SERPL-MCNC: 7.8 MG/DL (ref 8.6–10.4)
CHLORIDE SERPL-SCNC: 106 MMOL/L (ref 98–107)
CO2 SERPL-SCNC: 23 MMOL/L (ref 20–31)
CREAT SERPL-MCNC: 0.3 MG/DL (ref 0.5–0.9)
GFR SERPL CREATININE-BSD FRML MDRD: >60 ML/MIN/1.73M2
GLUCOSE SERPL-MCNC: 225 MG/DL (ref 70–99)
POTASSIUM SERPL-SCNC: 4 MMOL/L (ref 3.7–5.3)
SODIUM SERPL-SCNC: 138 MMOL/L (ref 135–144)

## 2024-02-26 PROCEDURE — 80048 BASIC METABOLIC PNL TOTAL CA: CPT

## 2024-02-26 NOTE — DISCHARGE INSTRUCTIONS
4  weeks                                                                                                   (Please note your next appointment above and if you are unable to keep, kindly give a 24 hour notice. Thank you.)  If more than 15 min late we cannot guarantee you will be seen due to clinician schedule  Per Policy, Excessive cancellation will call for dismissal from program.  If you experience any of the following, please call the Wound Care Center during business hours:  [phone]     * Increase in Pain  * Temperature over 101  * Increase in drainage from your wound  * Drainage with a foul odor  * Bleeding  * Increase in swelling  * Need for compression bandage changes due to slippage, breakthrough drainage.     If you need medical attention outside of the business hours of the Wound Care Centers please contact your PCP or go to the nearest emergency room.     The information contained in the After Visit Summary has been reviewed with me, the patient and/or responsible adult, by my health care provider(s). I had the opportunity to ask questions regarding this information. I have elected to receive;      []After Visit Summary  [x]Comprehensive Discharge Instruction      Patient signature______________________________________Date:________  Electronically signed by Laura Aguillon RN on 2/28/2024 at 9:21 AM  Electronically signed by BERNARDA FERMIN CNP on 2/28/2024 at 10:36 AM

## 2024-02-28 ENCOUNTER — TELEPHONE (OUTPATIENT)
Dept: WOUND CARE | Age: 44
End: 2024-02-28

## 2024-02-28 ENCOUNTER — HOSPITAL ENCOUNTER (OUTPATIENT)
Dept: WOUND CARE | Age: 44
Discharge: HOME | End: 2024-02-28
Payer: MEDICARE

## 2024-02-28 VITALS
BODY MASS INDEX: 32.43 KG/M2 | WEIGHT: 214 LBS | DIASTOLIC BLOOD PRESSURE: 72 MMHG | SYSTOLIC BLOOD PRESSURE: 123 MMHG | HEIGHT: 68 IN | RESPIRATION RATE: 20 BRPM | HEART RATE: 90 BPM | TEMPERATURE: 97.3 F

## 2024-02-28 DIAGNOSIS — S71.102D OPEN THIGH WOUND, LEFT, SUBSEQUENT ENCOUNTER: ICD-10-CM

## 2024-02-28 DIAGNOSIS — L89.223 PRESSURE INJURY OF LEFT THIGH, STAGE 3 (HCC): Primary | ICD-10-CM

## 2024-02-28 DIAGNOSIS — L89.313 PRESSURE ULCER OF RIGHT BUTTOCK, STAGE 3 (HCC): ICD-10-CM

## 2024-02-28 DIAGNOSIS — S71.102D: ICD-10-CM

## 2024-02-28 DIAGNOSIS — S71.002D: ICD-10-CM

## 2024-02-28 DIAGNOSIS — T81.89XD NONHEALING SURGICAL WOUND, SUBSEQUENT ENCOUNTER: ICD-10-CM

## 2024-02-28 DIAGNOSIS — E11.42 TYPE 2 DIABETES MELLITUS WITH DIABETIC POLYNEUROPATHY, WITH LONG-TERM CURRENT USE OF INSULIN (HCC): ICD-10-CM

## 2024-02-28 DIAGNOSIS — Z79.4 TYPE 2 DIABETES MELLITUS WITH DIABETIC POLYNEUROPATHY, WITH LONG-TERM CURRENT USE OF INSULIN (HCC): ICD-10-CM

## 2024-02-28 PROBLEM — D51.0 VITAMIN B12 DEFICIENCY ANEMIA DUE TO INTRINSIC FACTOR DEFICIENCY: Status: ACTIVE | Noted: 2023-01-01

## 2024-02-28 PROBLEM — E11.621 TYPE 2 DIABETES MELLITUS WITH FOOT ULCER, WITH LONG-TERM CURRENT USE OF INSULIN (HCC): Status: ACTIVE | Noted: 2023-01-01

## 2024-02-28 PROBLEM — D68.59 OTHER PRIMARY THROMBOPHILIA (HCC): Status: ACTIVE | Noted: 2023-01-01

## 2024-02-28 PROBLEM — J90 PLEURAL EFFUSION: Status: ACTIVE | Noted: 2022-04-04

## 2024-02-28 PROBLEM — I63.9 CEREBROVASCULAR ACCIDENT (CVA) (HCC): Status: ACTIVE | Noted: 2021-11-29

## 2024-02-28 PROBLEM — G43.909 MIGRAINE: Status: ACTIVE | Noted: 2021-11-29

## 2024-02-28 PROBLEM — Z87.01 PERSONAL HISTORY OF PNEUMONIA (RECURRENT): Status: ACTIVE | Noted: 2023-01-01

## 2024-02-28 PROBLEM — L97.509 TYPE 2 DIABETES MELLITUS WITH FOOT ULCER, WITH LONG-TERM CURRENT USE OF INSULIN (HCC): Status: ACTIVE | Noted: 2023-01-01

## 2024-02-28 PROBLEM — E66.01 MORBID OBESITY (HCC): Status: ACTIVE | Noted: 2023-09-20

## 2024-02-28 PROBLEM — Z87.440 PERSONAL HISTORY OF URINARY (TRACT) INFECTIONS: Status: ACTIVE | Noted: 2023-01-01

## 2024-02-28 PROBLEM — E44.1 MILD PROTEIN-CALORIE MALNUTRITION (HCC): Status: ACTIVE | Noted: 2023-01-01

## 2024-02-28 PROBLEM — Z86.14 PERSONAL HISTORY OF METHICILLIN RESISTANT STAPHYLOCOCCUS AUREUS INFECTION: Status: ACTIVE | Noted: 2023-01-01

## 2024-02-28 PROBLEM — E78.1 PURE HYPERGLYCERIDEMIA: Status: ACTIVE | Noted: 2023-01-01

## 2024-02-28 PROBLEM — K43.2 RECURRENT VENTRAL INCISIONAL HERNIA: Status: ACTIVE | Noted: 2021-11-29

## 2024-02-28 PROBLEM — Z86.711 PERSONAL HISTORY OF PE (PULMONARY EMBOLISM): Status: ACTIVE | Noted: 2023-01-01

## 2024-02-28 PROBLEM — Z95.828 PRESENCE OF OTHER VASCULAR IMPLANTS AND GRAFTS: Status: ACTIVE | Noted: 2023-01-01

## 2024-02-28 PROBLEM — D50.9 IRON DEFICIENCY ANEMIA: Status: ACTIVE | Noted: 2023-01-01

## 2024-02-28 PROBLEM — Z86.718 PERSONAL HISTORY OF OTHER VENOUS THROMBOSIS AND EMBOLISM: Status: ACTIVE | Noted: 2023-01-01

## 2024-02-28 PROBLEM — T84.52XA PROSTHETIC JOINT INFECTION OF LEFT HIP (HCC): Status: ACTIVE | Noted: 2023-12-30

## 2024-02-28 PROCEDURE — 11045 DBRDMT SUBQ TISS EACH ADDL: CPT | Performed by: NURSE PRACTITIONER

## 2024-02-28 PROCEDURE — 11045 DBRDMT SUBQ TISS EACH ADDL: CPT

## 2024-02-28 PROCEDURE — 11042 DBRDMT SUBQ TIS 1ST 20SQCM/<: CPT | Performed by: NURSE PRACTITIONER

## 2024-02-28 PROCEDURE — 99203 OFFICE O/P NEW LOW 30 MIN: CPT | Performed by: NURSE PRACTITIONER

## 2024-02-28 PROCEDURE — 99214 OFFICE O/P EST MOD 30 MIN: CPT

## 2024-02-28 PROCEDURE — 11042 DBRDMT SUBQ TIS 1ST 20SQCM/<: CPT

## 2024-02-28 RX ORDER — HYDROMORPHONE HYDROCHLORIDE 2 MG/1
2 TABLET ORAL 2 TIMES DAILY
COMMUNITY

## 2024-02-28 RX ORDER — LORAZEPAM 1 MG/1
1 TABLET ORAL EVERY 8 HOURS PRN
COMMUNITY

## 2024-02-28 RX ORDER — FLUCONAZOLE 200 MG/1
200 TABLET ORAL DAILY
COMMUNITY
End: 2024-03-12

## 2024-02-28 RX ORDER — PROMETHAZINE HYDROCHLORIDE 25 MG/1
25 TABLET ORAL EVERY 8 HOURS PRN
COMMUNITY

## 2024-02-28 RX ORDER — LIDOCAINE HYDROCHLORIDE 40 MG/ML
SOLUTION TOPICAL ONCE
OUTPATIENT
Start: 2024-02-28 | End: 2024-02-28

## 2024-02-28 RX ORDER — OXYCODONE HYDROCHLORIDE 15 MG/1
15 TABLET ORAL EVERY 4 HOURS PRN
COMMUNITY

## 2024-02-28 RX ORDER — METRONIDAZOLE 500 MG/1
500 TABLET ORAL 3 TIMES DAILY
COMMUNITY

## 2024-02-28 RX ORDER — LIDOCAINE HYDROCHLORIDE 20 MG/ML
JELLY TOPICAL ONCE
OUTPATIENT
Start: 2024-02-28 | End: 2024-02-28

## 2024-02-28 RX ORDER — LIDOCAINE HYDROCHLORIDE 20 MG/ML
JELLY TOPICAL ONCE
Status: CANCELLED | OUTPATIENT
Start: 2024-02-28 | End: 2024-02-28

## 2024-02-28 RX ORDER — DAPTOMYCIN 50 MG/ML
900 INJECTION, POWDER, LYOPHILIZED, FOR SOLUTION INTRAVENOUS NIGHTLY
COMMUNITY
End: 2024-03-12

## 2024-02-28 RX ORDER — CEFTAZIDIME 2 G/1
2500 INJECTION, POWDER, FOR SOLUTION INTRAVENOUS EVERY 8 HOURS
COMMUNITY
End: 2024-03-12

## 2024-02-28 RX ORDER — SENNOSIDES A AND B 8.6 MG/1
1 TABLET, FILM COATED ORAL EVERY 12 HOURS PRN
COMMUNITY

## 2024-02-28 RX ORDER — LOPERAMIDE HYDROCHLORIDE 2 MG/1
2 CAPSULE ORAL 4 TIMES DAILY PRN
COMMUNITY

## 2024-02-28 RX ORDER — DICYCLOMINE HYDROCHLORIDE 10 MG/1
10 CAPSULE ORAL
COMMUNITY

## 2024-02-28 RX ORDER — ACETAMINOPHEN 325 MG/1
650 TABLET ORAL EVERY 6 HOURS PRN
COMMUNITY

## 2024-02-28 RX ORDER — M-VIT,TX,IRON,MINS/CALC/FOLIC 27MG-0.4MG
1 TABLET ORAL DAILY
COMMUNITY

## 2024-02-28 RX ORDER — OMEPRAZOLE 20 MG/1
20 CAPSULE, DELAYED RELEASE ORAL DAILY
COMMUNITY

## 2024-02-28 RX ORDER — BACLOFEN 10 MG/1
10 TABLET ORAL 2 TIMES DAILY
COMMUNITY

## 2024-02-28 RX ORDER — LIDOCAINE HYDROCHLORIDE 40 MG/ML
SOLUTION TOPICAL ONCE
Status: DISCONTINUED | OUTPATIENT
Start: 2024-02-28 | End: 2024-02-29 | Stop reason: HOSPADM

## 2024-02-28 RX ORDER — METHOCARBAMOL 500 MG/1
500 TABLET, FILM COATED ORAL 3 TIMES DAILY
COMMUNITY

## 2024-02-28 RX ORDER — CHOLESTYRAMINE 4 G/9G
1 POWDER, FOR SUSPENSION ORAL DAILY
COMMUNITY

## 2024-02-28 ASSESSMENT — ENCOUNTER SYMPTOMS
COUGH: 0
ABDOMINAL PAIN: 0
NAUSEA: 0
DIARRHEA: 0
RHINORRHEA: 0
SHORTNESS OF BREATH: 0
VOMITING: 0

## 2024-02-28 ASSESSMENT — PAIN SCALES - GENERAL: PAINLEVEL_OUTOF10: 7

## 2024-02-28 ASSESSMENT — PAIN DESCRIPTION - FREQUENCY: FREQUENCY: INTERMITTENT

## 2024-02-28 ASSESSMENT — PAIN DESCRIPTION - ONSET: ONSET: ON-GOING

## 2024-02-28 ASSESSMENT — PAIN DESCRIPTION - ORIENTATION: ORIENTATION: LEFT;INNER;OUTER

## 2024-02-28 ASSESSMENT — PAIN DESCRIPTION - LOCATION: LOCATION: LEG

## 2024-02-28 ASSESSMENT — PAIN DESCRIPTION - PAIN TYPE: TYPE: CHRONIC PAIN;SURGICAL PAIN

## 2024-02-28 NOTE — PROGRESS NOTES
coordination normal and speech normal      Assessment:     Problem List Items Addressed This Visit       Nonhealing surgical wound, subsequent encounter    Relevant Medications    lidocaine (XYLOCAINE) 4 % external solution (Start on 2/28/2024 11:00 AM)    Other Relevant Orders    Initiate Outpatient Wound Care Protocol    Open thigh wound, left, subsequent encounter    Relevant Medications    lidocaine (XYLOCAINE) 4 % external solution (Start on 2/28/2024 11:00 AM)    Other Relevant Orders    Initiate Outpatient Wound Care Protocol    Open wnd hip/thigh-complicated, left, subsequent encounter    Relevant Medications    lidocaine (XYLOCAINE) 4 % external solution (Start on 2/28/2024 11:00 AM)    Other Relevant Orders    Initiate Outpatient Wound Care Protocol    Pressure injury of left thigh, stage 3 (HCC) - Primary    Relevant Medications    lidocaine (XYLOCAINE) 4 % external solution (Start on 2/28/2024 11:00 AM)    Other Relevant Orders    Initiate Outpatient Wound Care Protocol    Pressure ulcer of right buttock, stage 3 (HCC)    Relevant Medications    lidocaine (XYLOCAINE) 4 % external solution (Start on 2/28/2024 11:00 AM)    Other Relevant Orders    Initiate Outpatient Wound Care Protocol    Type 2 diabetes mellitus with diabetic polyneuropathy, with long-term current use of insulin (HCC)    Relevant Medications    LORazepam (ATIVAN) 1 MG tablet    baclofen (LIORESAL) 10 MG tablet    Insulin Pump - insulin lispro    methocarbamol (ROBAXIN) 500 MG tablet    lidocaine (XYLOCAINE) 4 % external solution (Start on 2/28/2024 11:00 AM)    Other Relevant Orders    Initiate Outpatient Wound Care Protocol        Procedure Note  Indications:  Based on my examination of this patient's wound(s)/ulcer(s) today, debridement is required to promote healing and evaluate the wound base.    Performed by: BERNARDA FERMIN - CNP    Consent obtained:  Yes    Time out taken:  Yes    Pain Control: Anesthetic  Anesthetic: 4%

## 2024-02-28 NOTE — TELEPHONE ENCOUNTER
Call from Clients mother who is laisted as HIPAA contact. Mrs. rGant was asking for update on dtrs wound care visit today.  Explained to her that some debridement was done to the edges of wounds but was stopped due to pain issues.  Ointment (santyl) was ordered to help softened nonviable tissue to be applied in ecf.  Wound vac was reordered for Left hip wound.  Explained that patient will need to be seen weekly for awhile.  Mother verbalized understanding.

## 2024-02-29 ENCOUNTER — HOSPITAL ENCOUNTER (OUTPATIENT)
Age: 44
Setting detail: SPECIMEN
Discharge: HOME | End: 2024-02-29
Payer: MEDICARE

## 2024-02-29 LAB
ALBUMIN SERPL-MCNC: 2.1 G/DL (ref 3.5–5.2)
ALBUMIN/GLOB SERPL: 1 {RATIO} (ref 1–2.5)
ALP SERPL-CCNC: 107 U/L (ref 35–104)
ALT SERPL-CCNC: 7 U/L (ref 10–35)
ANION GAP SERPL CALCULATED.3IONS-SCNC: 7 MMOL/L (ref 9–16)
AST SERPL-CCNC: 16 U/L (ref 10–35)
BASOPHILS # BLD: 0.06 K/UL (ref 0–0.2)
BASOPHILS NFR BLD: 1 % (ref 0–2)
BILIRUB DIRECT SERPL-MCNC: <0.2 MG/DL (ref 0–0.3)
BILIRUB INDIRECT SERPL-MCNC: 0.1 MG/DL (ref 0–1)
BILIRUB SERPL-MCNC: <0.2 MG/DL (ref 0–1.2)
BUN SERPL-MCNC: 8 MG/DL (ref 6–20)
CALCIUM SERPL-MCNC: 8.3 MG/DL (ref 8.6–10.4)
CHLORIDE SERPL-SCNC: 106 MMOL/L (ref 98–107)
CK SERPL-CCNC: 20 U/L (ref 26–192)
CO2 SERPL-SCNC: 23 MMOL/L (ref 20–31)
CREAT SERPL-MCNC: 0.3 MG/DL (ref 0.5–0.9)
EOSINOPHIL # BLD: 0.51 K/UL (ref 0–0.44)
EOSINOPHILS RELATIVE PERCENT: 9 % (ref 1–4)
ERYTHROCYTE [DISTWIDTH] IN BLOOD BY AUTOMATED COUNT: 16.7 % (ref 11.8–14.4)
GFR SERPL CREATININE-BSD FRML MDRD: >60 ML/MIN/1.73M2
GLOBULIN SER CALC-MCNC: 2.8 G/DL
GLUCOSE SERPL-MCNC: 120 MG/DL (ref 74–99)
HCT VFR BLD AUTO: 29.6 % (ref 36.3–47.1)
HGB BLD-MCNC: 8.6 G/DL (ref 11.9–15.1)
IMM GRANULOCYTES # BLD AUTO: 0.03 K/UL (ref 0–0.3)
IMM GRANULOCYTES NFR BLD: 1 %
LYMPHOCYTES NFR BLD: 1.22 K/UL (ref 1.1–3.7)
LYMPHOCYTES RELATIVE PERCENT: 20 % (ref 24–43)
MCH RBC QN AUTO: 25.7 PG (ref 25.2–33.5)
MCHC RBC AUTO-ENTMCNC: 29.1 G/DL (ref 28.4–34.8)
MCV RBC AUTO: 88.4 FL (ref 82.6–102.9)
MONOCYTES NFR BLD: 0.44 K/UL (ref 0.1–1.2)
MONOCYTES NFR BLD: 7 % (ref 3–12)
NEUTROPHILS NFR BLD: 62 % (ref 36–65)
NEUTS SEG NFR BLD: 3.72 K/UL (ref 1.5–8.1)
NRBC BLD-RTO: 0 PER 100 WBC
PLATELET # BLD AUTO: 460 K/UL (ref 138–453)
PMV BLD AUTO: 11.7 FL (ref 8.1–13.5)
POTASSIUM SERPL-SCNC: 4 MMOL/L (ref 3.7–5.3)
PROT SERPL-MCNC: 4.9 G/DL (ref 6.6–8.7)
RBC # BLD AUTO: 3.35 M/UL (ref 3.95–5.11)
RBC # BLD: ABNORMAL 10*6/UL
SODIUM SERPL-SCNC: 136 MMOL/L (ref 136–145)
WBC OTHER # BLD: 6 K/UL (ref 3.5–11.3)

## 2024-02-29 PROCEDURE — 80076 HEPATIC FUNCTION PANEL: CPT

## 2024-02-29 PROCEDURE — 82550 ASSAY OF CK (CPK): CPT

## 2024-02-29 PROCEDURE — P9603 ONE-WAY ALLOW PRORATED MILES: HCPCS

## 2024-02-29 PROCEDURE — 80048 BASIC METABOLIC PNL TOTAL CA: CPT

## 2024-02-29 PROCEDURE — 85025 COMPLETE CBC W/AUTO DIFF WBC: CPT

## 2024-03-04 ENCOUNTER — HOSPITAL ENCOUNTER (OUTPATIENT)
Age: 44
Setting detail: SPECIMEN
Discharge: HOME | End: 2024-03-04

## 2024-03-04 LAB
ANION GAP SERPL CALCULATED.3IONS-SCNC: 9 MMOL/L (ref 9–17)
BUN SERPL-MCNC: 5 MG/DL (ref 6–20)
CALCIUM SERPL-MCNC: 8.2 MG/DL (ref 8.6–10.4)
CHLORIDE SERPL-SCNC: 106 MMOL/L (ref 98–107)
CO2 SERPL-SCNC: 23 MMOL/L (ref 20–31)
CREAT SERPL-MCNC: 0.3 MG/DL (ref 0.5–0.9)
GFR SERPL CREATININE-BSD FRML MDRD: >60 ML/MIN/1.73M2
GLUCOSE SERPL-MCNC: 93 MG/DL (ref 70–99)
POTASSIUM SERPL-SCNC: 4 MMOL/L (ref 3.7–5.3)
SODIUM SERPL-SCNC: 138 MMOL/L (ref 135–144)

## 2024-03-04 PROCEDURE — 80048 BASIC METABOLIC PNL TOTAL CA: CPT

## 2024-03-04 PROCEDURE — P9603 ONE-WAY ALLOW PRORATED MILES: HCPCS

## 2024-03-06 ENCOUNTER — HOSPITAL ENCOUNTER (OUTPATIENT)
Dept: WOUND CARE | Age: 44
Discharge: HOME | End: 2024-03-06
Payer: COMMERCIAL

## 2024-03-06 VITALS
SYSTOLIC BLOOD PRESSURE: 129 MMHG | BODY MASS INDEX: 31.83 KG/M2 | RESPIRATION RATE: 18 BRPM | WEIGHT: 210 LBS | DIASTOLIC BLOOD PRESSURE: 69 MMHG | HEIGHT: 68 IN | HEART RATE: 87 BPM | TEMPERATURE: 99 F

## 2024-03-06 DIAGNOSIS — L89.223 PRESSURE INJURY OF LEFT THIGH, STAGE 3 (HCC): ICD-10-CM

## 2024-03-06 DIAGNOSIS — S71.102D OPEN THIGH WOUND, LEFT, SUBSEQUENT ENCOUNTER: ICD-10-CM

## 2024-03-06 DIAGNOSIS — S71.002D: ICD-10-CM

## 2024-03-06 DIAGNOSIS — L89.313 PRESSURE ULCER OF RIGHT BUTTOCK, STAGE 3 (HCC): ICD-10-CM

## 2024-03-06 DIAGNOSIS — Z79.4 TYPE 2 DIABETES MELLITUS WITH DIABETIC POLYNEUROPATHY, WITH LONG-TERM CURRENT USE OF INSULIN (HCC): ICD-10-CM

## 2024-03-06 DIAGNOSIS — T81.89XD NONHEALING SURGICAL WOUND, SUBSEQUENT ENCOUNTER: Primary | ICD-10-CM

## 2024-03-06 DIAGNOSIS — S71.102D: ICD-10-CM

## 2024-03-06 DIAGNOSIS — E11.42 TYPE 2 DIABETES MELLITUS WITH DIABETIC POLYNEUROPATHY, WITH LONG-TERM CURRENT USE OF INSULIN (HCC): ICD-10-CM

## 2024-03-06 PROCEDURE — 11045 DBRDMT SUBQ TISS EACH ADDL: CPT

## 2024-03-06 PROCEDURE — 11042 DBRDMT SUBQ TIS 1ST 20SQCM/<: CPT

## 2024-03-06 PROCEDURE — 11042 DBRDMT SUBQ TIS 1ST 20SQCM/<: CPT | Performed by: NURSE PRACTITIONER

## 2024-03-06 PROCEDURE — 11045 DBRDMT SUBQ TISS EACH ADDL: CPT | Performed by: NURSE PRACTITIONER

## 2024-03-06 RX ORDER — LIDOCAINE HYDROCHLORIDE 40 MG/ML
SOLUTION TOPICAL ONCE
Status: COMPLETED | OUTPATIENT
Start: 2024-03-06 | End: 2024-03-06

## 2024-03-06 RX ORDER — CEFTAZIDIME, AVIBACTAM 2; .5 G/1; G/1
2.5 POWDER, FOR SOLUTION INTRAVENOUS EVERY 8 HOURS
COMMUNITY

## 2024-03-06 RX ORDER — MIDODRINE HYDROCHLORIDE 2.5 MG/1
2.5 TABLET ORAL 2 TIMES DAILY PRN
COMMUNITY

## 2024-03-06 RX ORDER — LIDOCAINE HYDROCHLORIDE 40 MG/ML
SOLUTION TOPICAL ONCE
OUTPATIENT
Start: 2024-03-06 | End: 2024-03-06

## 2024-03-06 RX ORDER — LIDOCAINE HYDROCHLORIDE 20 MG/ML
JELLY TOPICAL ONCE
OUTPATIENT
Start: 2024-03-06 | End: 2024-03-06

## 2024-03-06 RX ADMIN — LIDOCAINE HYDROCHLORIDE 50 ML: 40 SOLUTION TOPICAL at 11:40

## 2024-03-06 ASSESSMENT — ENCOUNTER SYMPTOMS
DIARRHEA: 0
SHORTNESS OF BREATH: 0
COUGH: 0
ABDOMINAL PAIN: 0
VOMITING: 0
RHINORRHEA: 0
NAUSEA: 0

## 2024-03-06 ASSESSMENT — PAIN SCALES - GENERAL: PAINLEVEL_OUTOF10: 7

## 2024-03-06 NOTE — DISCHARGE INSTRUCTIONS
[facility]                            Visit  Discharge Instructions / Physician Orders  DATE:3/6/24     Home Care:[facility]     SUPPLIES ORDERED THRU:                     DATE LAST SUPPLIED     Wound Location: LEFT lat THIGH, LEFT HIP                                  Left inner thigh x 2                                 Right upper back  Cleanse with: Vashe all wounds     Dressing Orders:  HOLD WOUND VAC FOR 1 week due to bleeding-Left hip- start VASHE moist to moist dressing to both wounds continuous Change MWF                                 Left Lateral thigh- apply Charlie thickness Santyl to edges of wounds-then take 4\" kerlix 1 long piece and moisten with VASHE cover with abd pads secure with mefix tape                                 Right upper Buttock, Left inner thigh x2 apply Maria D then Silicone foam dressing to area                Frequency:  Left Hip change 2 x per day                       Left Lat Thigh-2 x per day                       Right upper buttock, Left inner thigh x2 change daily     Additional Orders: Increase protein to diet (meat, cheese, eggs, fish, peanut butter, nuts and beans)  Multivitamin daily   Please have house evaluate patient for bleeding from wounds -patient states she normally does not bleed like this  OFFLOADING [x] YES  TYPE:                  [] NA  Make sure she has an offloading air mattress to bed  No more than 2 hours up in Chair  Weekly wound care visits until determined otherwise.     Antibiotic therapy-wound care related YES [x] NO [] NA[]  Per [facility]  MY CHART []     Smart Device  []      HYPERBARIC TREATMENT-                TREATMENT #                          Your next appointment with the Wound Care Center is in  1 week with Heidi                                                                                                    2 weeks

## 2024-03-06 NOTE — PROGRESS NOTES
(comment) 02/28/24 1034   Wound Length (cm) 2 cm 03/06/24 1124   Wound Width (cm) 0.8 cm 03/06/24 1124   Wound Depth (cm) 0.1 cm 03/06/24 1124   Wound Surface Area (cm^2) 1.6 cm^2 03/06/24 1124   Change in Wound Size % (l*w) -14.29 03/06/24 1124   Wound Volume (cm^3) 0.16 cm^3 03/06/24 1124   Wound Healing % -14 03/06/24 1124   Post-Procedure Length (cm) 2 cm 03/06/24 1124   Post-Procedure Width (cm) 0.8 cm 03/06/24 1124   Post-Procedure Depth (cm) 0.1 cm 03/06/24 1124   Post-Procedure Surface Area (cm^2) 1.6 cm^2 03/06/24 1124   Post-Procedure Volume (cm^3) 0.16 cm^3 03/06/24 1124   Wound Assessment [place]/red;Slough 03/06/24 1124   Drainage Amount Moderate (25-50%) 03/06/24 1124   Drainage Description Serosanguinous 03/06/24 1124   Odor None 03/06/24 1124   Valerie-wound Assessment Blanchable erythema 03/06/24 1124   Margins Defined edges 03/06/24 1124   Number of days: 7          Percent of Wound(s)/Ulcer(s) Debrided: 20%    Total Surface Area Debrided:  27.73 sq cm     Diabetic/Pressure/Non Pressure Ulcers only:  Ulcer: Non-Pressure ulcer, fat layer exposed    Estimated Blood Loss:  Minimal    Hemostasis Achieved:  by pressure    Procedural Pain:  8  / 10     Post Procedural Pain:  8 / 10     Response to treatment:  Poorly tolerated by patient., With complaints of pain.       Plan:     Treatment; see discharge instructions for dressing directions  Hold wound vac   Follow up in wound clinic in one week   See Discharge Instructions for further directions     Written patient dismissal instructions given to patient and signed by patient or POA.           Electronically signed by BERNARDA FERMIN CNP on 3/6/2024 at 12:01 PM

## 2024-03-08 ENCOUNTER — HOSPITAL ENCOUNTER (OUTPATIENT)
Age: 44
Setting detail: SPECIMEN
Discharge: HOME | End: 2024-03-08

## 2024-03-08 LAB
ALBUMIN SERPL-MCNC: 2.2 G/DL (ref 3.5–5.2)
ALBUMIN/GLOB SERPL: 1 {RATIO} (ref 1–2.5)
ALP SERPL-CCNC: 218 U/L (ref 35–104)
ALT SERPL-CCNC: 34 U/L (ref 10–35)
ANION GAP SERPL CALCULATED.3IONS-SCNC: 8 MMOL/L (ref 9–16)
AST SERPL-CCNC: 117 U/L (ref 10–35)
BASOPHILS # BLD: 0.07 K/UL (ref 0–0.2)
BASOPHILS NFR BLD: 1 % (ref 0–2)
BILIRUB DIRECT SERPL-MCNC: <0.2 MG/DL (ref 0–0.3)
BILIRUB INDIRECT SERPL-MCNC: 0.1 MG/DL (ref 0–1)
BILIRUB SERPL-MCNC: <0.2 MG/DL (ref 0–1.2)
BUN SERPL-MCNC: 8 MG/DL (ref 6–20)
CALCIUM SERPL-MCNC: 8.3 MG/DL (ref 8.6–10.4)
CHLORIDE SERPL-SCNC: 105 MMOL/L (ref 98–107)
CO2 SERPL-SCNC: 25 MMOL/L (ref 20–31)
CREAT SERPL-MCNC: 0.3 MG/DL (ref 0.5–0.9)
EOSINOPHIL # BLD: 0.61 K/UL (ref 0–0.44)
EOSINOPHILS RELATIVE PERCENT: 10 % (ref 1–4)
ERYTHROCYTE [DISTWIDTH] IN BLOOD BY AUTOMATED COUNT: 17.1 % (ref 11.8–14.4)
GLOBULIN SER CALC-MCNC: 2.8 G/DL
GLUCOSE SERPL-MCNC: 141 MG/DL (ref 74–99)
HCT VFR BLD AUTO: 32.7 % (ref 36.3–47.1)
HGB BLD-MCNC: 9.5 G/DL (ref 11.9–15.1)
IMM GRANULOCYTES # BLD AUTO: <0.03 K/UL (ref 0–0.3)
IMM GRANULOCYTES NFR BLD: 0 %
LYMPHOCYTES NFR BLD: 1.88 K/UL (ref 1.1–3.7)
MCHC RBC AUTO-ENTMCNC: 29.1 G/DL (ref 28.4–34.8)
MCV RBC AUTO: 88.6 FL (ref 82.6–102.9)
NEUTROPHILS NFR BLD: 51 % (ref 36–65)
NEUTS SEG NFR BLD: 3.19 K/UL (ref 1.5–8.1)
NRBC BLD-RTO: 0 PER 100 WBC
PLATELET # BLD AUTO: 397 K/UL (ref 138–453)
PMV BLD AUTO: 11.9 FL (ref 8.1–13.5)
POTASSIUM SERPL-SCNC: 4.3 MMOL/L (ref 3.7–5.3)
PROT SERPL-MCNC: 5 G/DL (ref 6.6–8.7)
RBC # BLD AUTO: 3.69 M/UL (ref 3.95–5.11)
RBC # BLD: ABNORMAL 10*6/UL
SODIUM SERPL-SCNC: 138 MMOL/L (ref 136–145)
WBC OTHER # BLD: 6.3 K/UL (ref 3.5–11.3)

## 2024-03-08 PROCEDURE — 80076 HEPATIC FUNCTION PANEL: CPT

## 2024-03-08 PROCEDURE — 80048 BASIC METABOLIC PNL TOTAL CA: CPT

## 2024-03-08 PROCEDURE — 85025 COMPLETE CBC W/AUTO DIFF WBC: CPT

## 2024-03-08 PROCEDURE — P9603 ONE-WAY ALLOW PRORATED MILES: HCPCS

## 2024-03-08 PROCEDURE — 82550 ASSAY OF CK (CPK): CPT

## 2024-03-08 NOTE — GROUP NOTE
Group Therapy Note    Date: 3/17/2023    Group Start Time: 8798  Group End Time: 1120  Group Topic: Cognitive Skills    [facility]    LUNA Butler        Group Therapy Note    Attendees 9/21       Patient's Goal:  pt will demonstrate improved coping skills and improved concentration     Notes:  pt was pleasant and participated well     Status After Intervention:  Improved    Participation Level:  Active Listener and Interactive    Participation Quality: Appropriate, Attentive, and Supportive      Speech:  normal      Thought Process/Content: Logical      Affective Functioning: flat      Mood: depressed      Level of consciousness:  Alert      Response to Learning: Able to verbalize current knowledge/experience and Progressing to goal      Endings: None Reported    Modes of Intervention: Education, Support, and Problem-solving      Discipline Responsible: Psychoeducational Specialist      Signature:  Zacarias Huggins Please triage increased anxiety

## 2024-03-11 ENCOUNTER — HOSPITAL ENCOUNTER (OUTPATIENT)
Age: 44
Setting detail: SPECIMEN
Discharge: HOME | End: 2024-03-11
Payer: COMMERCIAL

## 2024-03-11 LAB
ANION GAP SERPL CALCULATED.3IONS-SCNC: 10 MMOL/L (ref 9–16)
BUN SERPL-MCNC: 9 MG/DL (ref 6–20)
CALCIUM SERPL-MCNC: 8.2 MG/DL (ref 8.6–10.4)
CHLORIDE SERPL-SCNC: 104 MMOL/L (ref 98–107)
CO2 SERPL-SCNC: 24 MMOL/L (ref 20–31)
CREAT SERPL-MCNC: 0.3 MG/DL (ref 0.5–0.9)
GFR SERPL CREATININE-BSD FRML MDRD: >60 ML/MIN/1.73M2
GLUCOSE SERPL-MCNC: 218 MG/DL (ref 74–99)
POTASSIUM SERPL-SCNC: 4 MMOL/L (ref 3.7–5.3)
SODIUM SERPL-SCNC: 138 MMOL/L (ref 136–145)

## 2024-03-11 PROCEDURE — 80048 BASIC METABOLIC PNL TOTAL CA: CPT

## 2024-03-11 PROCEDURE — P9603 ONE-WAY ALLOW PRORATED MILES: HCPCS

## 2024-03-12 NOTE — DISCHARGE INSTRUCTIONS
weeks                                                                                                   (Please note your next appointment above and if you are unable to keep, kindly give a 24 hour notice. Thank you.)  If more than 15 min late we cannot guarantee you will be seen due to clinician schedule  Per Policy, Excessive cancellation will call for dismissal from program.  If you experience any of the following, please call the Wound Care Center during business hours:  [phone]     * Increase in Pain  * Temperature over 101  * Increase in drainage from your wound  * Drainage with a foul odor  * Bleeding  * Increase in swelling  * Need for compression bandage changes due to slippage, breakthrough drainage.     If you need medical attention outside of the business hours of the Wound Care Centers please contact your PCP or go to the nearest emergency room.     The information contained in the After Visit Summary has been reviewed with me, the patient and/or responsible adult, by my health care provider(s). I had the opportunity to ask questions regarding this information. I have elected to receive;      []After Visit Summary  [x]Comprehensive Discharge Instruction        Patient signature______________________________________Date:___  Electronically signed by Laura Aguillon RN on 3/13/2024 at 10:17 AM    Electronically signed by BERNARDA FERMIN - CNP on 3/13/2024 at 10:12 AM

## 2024-03-13 ENCOUNTER — HOSPITAL ENCOUNTER (OUTPATIENT)
Dept: WOUND CARE | Age: 44
Discharge: HOME | End: 2024-03-13
Payer: COMMERCIAL

## 2024-03-13 VITALS
DIASTOLIC BLOOD PRESSURE: 72 MMHG | HEART RATE: 87 BPM | TEMPERATURE: 97.7 F | WEIGHT: 210 LBS | SYSTOLIC BLOOD PRESSURE: 118 MMHG | RESPIRATION RATE: 18 BRPM | BODY MASS INDEX: 31.83 KG/M2 | HEIGHT: 68 IN

## 2024-03-13 DIAGNOSIS — L89.223 PRESSURE INJURY OF LEFT THIGH, STAGE 3 (HCC): ICD-10-CM

## 2024-03-13 DIAGNOSIS — S71.002D: ICD-10-CM

## 2024-03-13 DIAGNOSIS — Z79.4 TYPE 2 DIABETES MELLITUS WITH DIABETIC POLYNEUROPATHY, WITH LONG-TERM CURRENT USE OF INSULIN (HCC): ICD-10-CM

## 2024-03-13 DIAGNOSIS — E11.42 TYPE 2 DIABETES MELLITUS WITH DIABETIC POLYNEUROPATHY, WITH LONG-TERM CURRENT USE OF INSULIN (HCC): ICD-10-CM

## 2024-03-13 DIAGNOSIS — S71.102D OPEN THIGH WOUND, LEFT, SUBSEQUENT ENCOUNTER: ICD-10-CM

## 2024-03-13 DIAGNOSIS — T81.89XD NONHEALING SURGICAL WOUND, SUBSEQUENT ENCOUNTER: Primary | ICD-10-CM

## 2024-03-13 DIAGNOSIS — L89.313 PRESSURE ULCER OF RIGHT BUTTOCK, STAGE 3 (HCC): ICD-10-CM

## 2024-03-13 DIAGNOSIS — S71.102D: ICD-10-CM

## 2024-03-13 PROCEDURE — 11042 DBRDMT SUBQ TIS 1ST 20SQCM/<: CPT | Performed by: NURSE PRACTITIONER

## 2024-03-13 PROCEDURE — 11042 DBRDMT SUBQ TIS 1ST 20SQCM/<: CPT

## 2024-03-13 RX ORDER — LIDOCAINE HYDROCHLORIDE 40 MG/ML
SOLUTION TOPICAL ONCE
OUTPATIENT
Start: 2024-03-13 | End: 2024-03-13

## 2024-03-13 RX ORDER — LIDOCAINE HYDROCHLORIDE 20 MG/ML
JELLY TOPICAL ONCE
OUTPATIENT
Start: 2024-03-13 | End: 2024-03-13

## 2024-03-13 RX ORDER — LIDOCAINE HYDROCHLORIDE 40 MG/ML
SOLUTION TOPICAL ONCE
Status: COMPLETED | OUTPATIENT
Start: 2024-03-13 | End: 2024-03-13

## 2024-03-13 RX ORDER — LANOLIN ALCOHOL/MO/W.PET/CERES
6 CREAM (GRAM) TOPICAL NIGHTLY PRN
COMMUNITY

## 2024-03-13 RX ADMIN — LIDOCAINE HYDROCHLORIDE 30 ML: 40 SOLUTION TOPICAL at 10:29

## 2024-03-13 ASSESSMENT — PAIN DESCRIPTION - ONSET: ONSET: ON-GOING

## 2024-03-13 ASSESSMENT — PAIN DESCRIPTION - DESCRIPTORS: DESCRIPTORS: BURNING

## 2024-03-13 ASSESSMENT — ENCOUNTER SYMPTOMS
RHINORRHEA: 0
SHORTNESS OF BREATH: 0
ABDOMINAL PAIN: 0
DIARRHEA: 0
NAUSEA: 0
VOMITING: 0
COUGH: 0

## 2024-03-13 ASSESSMENT — PAIN DESCRIPTION - LOCATION: LOCATION: LEG

## 2024-03-13 ASSESSMENT — PAIN - FUNCTIONAL ASSESSMENT: PAIN_FUNCTIONAL_ASSESSMENT: PREVENTS OR INTERFERES WITH MANY ACTIVE NOT PASSIVE ACTIVITIES

## 2024-03-13 ASSESSMENT — PAIN SCALES - GENERAL: PAINLEVEL_OUTOF10: 5

## 2024-03-13 ASSESSMENT — PAIN DESCRIPTION - ORIENTATION: ORIENTATION: LEFT

## 2024-03-13 ASSESSMENT — PAIN DESCRIPTION - PAIN TYPE: TYPE: CHRONIC PAIN;SURGICAL PAIN

## 2024-03-13 ASSESSMENT — PAIN DESCRIPTION - FREQUENCY: FREQUENCY: INTERMITTENT

## 2024-03-13 NOTE — PROGRESS NOTES
Margins Undefined edges 03/13/24 1027   Wound Thickness Description not for Pressure Injury Full thickness 03/13/24 1027   Number of days: 13       Wound 02/28/24 Hip Left;Posterior #3 (Active)   Wound Image   02/28/24 0955   Wound Etiology Non-Healing Surgical 03/13/24 1027   Dressing Status New drainage noted;Old drainage noted 03/13/24 1027   Wound Cleansed Cleansed with saline 03/13/24 1027   Dressing/Treatment Other (comment) 02/28/24 1034   Wound Length (cm) 21.8 cm 03/13/24 1027   Wound Width (cm) 6 cm 03/13/24 1027   Wound Depth (cm) 1.8 cm 03/13/24 1027   Wound Surface Area (cm^2) 130.8 cm^2 03/13/24 1027   Change in Wound Size % (l*w) 8.25 03/13/24 1027   Wound Volume (cm^3) 235.44 cm^3 03/13/24 1027   Wound Healing % -84 03/13/24 1027   Post-Procedure Length (cm) 21.8 cm 03/13/24 1027   Post-Procedure Width (cm) 6 cm 03/13/24 1027   Post-Procedure Depth (cm) 1.8 cm 03/13/24 1027   Post-Procedure Surface Area (cm^2) 130.8 cm^2 03/13/24 1027   Post-Procedure Volume (cm^3) 235.44 cm^3 03/13/24 1027   Wound Assessment Pink/red;Exposed structure muscle;Granulation tissue 03/13/24 1027   Drainage Amount Moderate (25-50%) 03/13/24 1027   Drainage Description Serosanguinous;Yellow 03/13/24 1027   Odor None 03/13/24 1027   Valerie-wound Assessment Blanchable erythema 03/13/24 1027   Margins Defined edges 03/13/24 1027   Wound Thickness Description not for Pressure Injury Full thickness 03/13/24 1027   Number of days: 13       Wound 02/28/24 Buttocks Right;Upper #4 (Active)   Wound Image   02/28/24 0955   Wound Etiology Pressure Stage 3 03/13/24 1027   Dressing Status New drainage noted;Old drainage noted 03/13/24 1027   Wound Cleansed Cleansed with saline 03/13/24 1027   Dressing/Treatment Other (comment) 02/28/24 1034   Wound Length (cm) 1.5 cm 03/13/24 1027   Wound Width (cm) 1.3 cm 03/13/24 1027   Wound Depth (cm) 0.1 cm 03/13/24 1027   Wound Surface Area (cm^2) 1.95 cm^2 03/13/24 1027   Change in Wound Size %

## 2024-03-15 ENCOUNTER — HOSPITAL ENCOUNTER (OUTPATIENT)
Age: 44
Setting detail: SPECIMEN
Discharge: HOME | End: 2024-03-15

## 2024-03-15 LAB
ALBUMIN SERPL-MCNC: 2.8 G/DL (ref 3.5–5.2)
ALBUMIN/GLOB SERPL: 1 {RATIO} (ref 1–2.5)
ALP SERPL-CCNC: 309 U/L (ref 35–104)
ALT SERPL-CCNC: 36 U/L (ref 10–35)
ANION GAP SERPL CALCULATED.3IONS-SCNC: 14 MMOL/L (ref 9–16)
AST SERPL-CCNC: 32 U/L (ref 10–35)
BASOPHILS # BLD: 0.06 K/UL (ref 0–0.2)
BASOPHILS NFR BLD: 1 % (ref 0–2)
BILIRUB DIRECT SERPL-MCNC: <0.2 MG/DL (ref 0–0.3)
BILIRUB INDIRECT SERPL-MCNC: 0.1 MG/DL (ref 0–1)
BILIRUB SERPL-MCNC: 0.2 MG/DL (ref 0–1.2)
BUN SERPL-MCNC: 10 MG/DL (ref 6–20)
CALCIUM SERPL-MCNC: 9.1 MG/DL (ref 8.6–10.4)
CHLORIDE SERPL-SCNC: 103 MMOL/L (ref 98–107)
CK SERPL-CCNC: 27 U/L (ref 26–192)
CO2 SERPL-SCNC: 19 MMOL/L (ref 20–31)
CREAT SERPL-MCNC: 0.4 MG/DL (ref 0.5–0.9)
EOSINOPHIL # BLD: 0.37 K/UL (ref 0–0.44)
EOSINOPHILS RELATIVE PERCENT: 6 % (ref 1–4)
ERYTHROCYTE [DISTWIDTH] IN BLOOD BY AUTOMATED COUNT: 18.3 % (ref 11.8–14.4)
GFR SERPL CREATININE-BSD FRML MDRD: >60 ML/MIN/1.73M2
GLOBULIN SER CALC-MCNC: 3.3 G/DL
GLUCOSE SERPL-MCNC: 188 MG/DL (ref 74–99)
HCT VFR BLD AUTO: 40.4 % (ref 36.3–47.1)
HGB BLD-MCNC: 12.5 G/DL (ref 11.9–15.1)
IMM GRANULOCYTES # BLD AUTO: <0.03 K/UL (ref 0–0.3)
IMM GRANULOCYTES NFR BLD: 0 %
LYMPHOCYTES NFR BLD: 1.34 K/UL (ref 1.1–3.7)
LYMPHOCYTES RELATIVE PERCENT: 21 % (ref 24–43)
MCH RBC QN AUTO: 26.7 PG (ref 25.2–33.5)
MCHC RBC AUTO-ENTMCNC: 30.9 G/DL (ref 28.4–34.8)
MCV RBC AUTO: 86.1 FL (ref 82.6–102.9)
MONOCYTES NFR BLD: 0.42 K/UL (ref 0.1–1.2)
MONOCYTES NFR BLD: 7 % (ref 3–12)
NEUTROPHILS NFR BLD: 66 % (ref 36–65)
NEUTS SEG NFR BLD: 4.23 K/UL (ref 1.5–8.1)
NRBC BLD-RTO: 0 PER 100 WBC
PLATELET # BLD AUTO: 391 K/UL (ref 138–453)
PMV BLD AUTO: 12.1 FL (ref 8.1–13.5)
POTASSIUM SERPL-SCNC: 4.5 MMOL/L (ref 3.7–5.3)
PROT SERPL-MCNC: 6.1 G/DL (ref 6.6–8.7)
RBC # BLD AUTO: 4.69 M/UL (ref 3.95–5.11)
RBC # BLD: ABNORMAL 10*6/UL
SODIUM SERPL-SCNC: 136 MMOL/L (ref 136–145)
WBC OTHER # BLD: 6.4 K/UL (ref 3.5–11.3)

## 2024-03-15 PROCEDURE — 36415 COLL VENOUS BLD VENIPUNCTURE: CPT

## 2024-03-15 PROCEDURE — P9603 ONE-WAY ALLOW PRORATED MILES: HCPCS

## 2024-03-15 PROCEDURE — 80048 BASIC METABOLIC PNL TOTAL CA: CPT

## 2024-03-15 PROCEDURE — 82550 ASSAY OF CK (CPK): CPT

## 2024-03-15 PROCEDURE — 85025 COMPLETE CBC W/AUTO DIFF WBC: CPT

## 2024-03-15 PROCEDURE — 80076 HEPATIC FUNCTION PANEL: CPT

## 2024-03-15 NOTE — DISCHARGE INSTRUCTIONS
[facility]OUND and HYPERBAR[facility]REATMENT  CENTER                            Visit  Discharge Instructions / Physician Orders  DATE:3/20/24     Home Care:[facility]     SUPPLIES ORDERED THRU:                     DATE LAST SUPPLIED     Wound Location: LEFT lat THIGH, LEFT HIP                                  Left inner thigh                                  Right upper back  Cleanse with: Vashe all wounds     Dressing Orders:  Left hip- tuck Silvercel strip into deep area in the middle of the wound then leave a tail  Continue VASHE moist to moist dressing to both wounds                                   Left Lateral thigh   Continue  4\" kerlix 1 long piece and moisten with VASHE cover with  silicone dressings ( may need 3-4) tape is tearing skin up                                 Right upper Buttock, Left inner thigh  apply Maria D then Silicone foam dressing to area                Frequency:   Left Hip change 2 x per day                        Left Lat Thigh-2 x per day                        Right upper buttock, Left inner thigh x2 change daily     Additional Orders: Increase protein to diet (meat, cheese, eggs, fish, peanut butter, nuts and beans)  Multivitamin daily      OFFLOADING [x] YES  TYPE:                  [] NA  Make sure she has an offloading air mattress to bed  No more than 2 hours up in Chair  Weekly wound care visits until determined otherwise.     Antibiotic therapy-wound care related YES [x] NO [] NA[]  Per [facility]- please make follow up appointment  with [facility] for patient to discuss options-needs to be in person per [facility] request  MY CHART []     Smart Device  []      HYPERBARIC TREATMENT-                TREATMENT #                          Your next appointment with the Wound Care Center is in  1 week with Heidi                                                                                                    2 weeks

## 2024-03-18 ENCOUNTER — HOSPITAL ENCOUNTER (OUTPATIENT)
Age: 44
Setting detail: SPECIMEN
Discharge: HOME | End: 2024-03-18

## 2024-03-18 LAB
ANION GAP SERPL CALCULATED.3IONS-SCNC: 12 MMOL/L (ref 9–16)
BUN SERPL-MCNC: 10 MG/DL (ref 6–20)
CALCIUM SERPL-MCNC: 8.5 MG/DL (ref 8.6–10.4)
CHLORIDE SERPL-SCNC: 106 MMOL/L (ref 98–107)
CO2 SERPL-SCNC: 17 MMOL/L (ref 20–31)
CREAT SERPL-MCNC: 0.4 MG/DL (ref 0.5–0.9)
GFR SERPL CREATININE-BSD FRML MDRD: >60 ML/MIN/1.73M2
GLUCOSE SERPL-MCNC: 252 MG/DL (ref 74–99)
POTASSIUM SERPL-SCNC: 4.9 MMOL/L (ref 3.7–5.3)
SODIUM SERPL-SCNC: 135 MMOL/L (ref 136–145)

## 2024-03-18 PROCEDURE — 36415 COLL VENOUS BLD VENIPUNCTURE: CPT

## 2024-03-18 PROCEDURE — 80048 BASIC METABOLIC PNL TOTAL CA: CPT

## 2024-03-18 PROCEDURE — P9603 ONE-WAY ALLOW PRORATED MILES: HCPCS

## 2024-03-20 ENCOUNTER — HOSPITAL ENCOUNTER (OUTPATIENT)
Dept: WOUND CARE | Age: 44
Discharge: HOME | End: 2024-03-20
Payer: COMMERCIAL

## 2024-03-20 VITALS
HEIGHT: 68 IN | TEMPERATURE: 98.1 F | RESPIRATION RATE: 18 BRPM | WEIGHT: 210 LBS | BODY MASS INDEX: 31.83 KG/M2 | HEART RATE: 82 BPM | DIASTOLIC BLOOD PRESSURE: 64 MMHG | SYSTOLIC BLOOD PRESSURE: 110 MMHG

## 2024-03-20 DIAGNOSIS — Z79.4 TYPE 2 DIABETES MELLITUS WITH DIABETIC POLYNEUROPATHY, WITH LONG-TERM CURRENT USE OF INSULIN (HCC): ICD-10-CM

## 2024-03-20 DIAGNOSIS — S71.002D: ICD-10-CM

## 2024-03-20 DIAGNOSIS — L89.223 PRESSURE INJURY OF LEFT THIGH, STAGE 3 (HCC): ICD-10-CM

## 2024-03-20 DIAGNOSIS — E11.42 TYPE 2 DIABETES MELLITUS WITH DIABETIC POLYNEUROPATHY, WITH LONG-TERM CURRENT USE OF INSULIN (HCC): ICD-10-CM

## 2024-03-20 DIAGNOSIS — L89.313 PRESSURE ULCER OF RIGHT BUTTOCK, STAGE 3 (HCC): ICD-10-CM

## 2024-03-20 DIAGNOSIS — S71.102D OPEN THIGH WOUND, LEFT, SUBSEQUENT ENCOUNTER: ICD-10-CM

## 2024-03-20 DIAGNOSIS — S71.102D: ICD-10-CM

## 2024-03-20 DIAGNOSIS — T81.89XD NONHEALING SURGICAL WOUND, SUBSEQUENT ENCOUNTER: Primary | ICD-10-CM

## 2024-03-20 PROCEDURE — 11042 DBRDMT SUBQ TIS 1ST 20SQCM/<: CPT | Performed by: NURSE PRACTITIONER

## 2024-03-20 PROCEDURE — 11045 DBRDMT SUBQ TISS EACH ADDL: CPT | Performed by: NURSE PRACTITIONER

## 2024-03-20 PROCEDURE — 11045 DBRDMT SUBQ TISS EACH ADDL: CPT

## 2024-03-20 PROCEDURE — 11042 DBRDMT SUBQ TIS 1ST 20SQCM/<: CPT

## 2024-03-20 RX ORDER — LIDOCAINE HYDROCHLORIDE 40 MG/ML
SOLUTION TOPICAL ONCE
OUTPATIENT
Start: 2024-03-20 | End: 2024-03-20

## 2024-03-20 RX ORDER — LIDOCAINE HYDROCHLORIDE 20 MG/ML
JELLY TOPICAL ONCE
OUTPATIENT
Start: 2024-03-20 | End: 2024-03-20

## 2024-03-20 RX ORDER — LIDOCAINE HYDROCHLORIDE 40 MG/ML
SOLUTION TOPICAL ONCE
Status: COMPLETED | OUTPATIENT
Start: 2024-03-20 | End: 2024-03-20

## 2024-03-20 RX ADMIN — LIDOCAINE HYDROCHLORIDE 50 ML: 40 SOLUTION TOPICAL at 10:22

## 2024-03-20 ASSESSMENT — ENCOUNTER SYMPTOMS
COUGH: 0
ABDOMINAL PAIN: 0
VOMITING: 0
RHINORRHEA: 0
SHORTNESS OF BREATH: 0
DIARRHEA: 0
NAUSEA: 0

## 2024-03-20 ASSESSMENT — PAIN SCALES - GENERAL: PAINLEVEL_OUTOF10: 5

## 2024-03-20 NOTE — PROGRESS NOTES
[facility]   Progress Note and Procedure Note      Krista Chiang  MEDICAL RECORD NUMBER:  [id number]  AGE: 43 y.o.   GENDER: female  : 1980  EPISODE DATE:  3/20/2024    Subjective:     Chief Complaint   Patient presents with    Wound Check     Left thigh, left hip, right buttock         HISTORY of PRESENT ILLNESS HPI     Krista Chiang is a 43 y.o. female who presents today for wound/ulcer evaluation.   History of Wound Context: here to follow up on multiple wounds on left thigh, left hip, and right upper buttocks.   Had left hip arthroplasty with infection and dehiscence after. Has had multiple debridements of this wound with last being 2024 at [facility] She was discharged from [facility] 2024 to [facility] (to be close to home) with wound vac on wound. Once at [facility] developed eschar so wound vac was discontinued. [facility] plastic surgery did confirm that she can follow at local wound clinic as she is unable to get transportation to their facility. She is taking IV antibiotics which are being managed by ID at [facility] with labs weekly.   Wound/Ulcer Pain Timing/Severity: constant  Quality of pain: burning, throbbing  Severity:  5 / 10   Modifying Factors: Pain worsens with touching  Associated Signs/Symptoms: none    Ulcer Identification:  Ulcer Type: pressure and non-healing surgical  Contributing Factors: diabetes, chronic pressure, decreased mobility, obesity, immunosuppression, malnutrition, and substance abuse         PAST MEDICAL HISTORY        Diagnosis Date    Alcohol abuse     Recurrent episodes of withdrawal    Alcoholic hepatitis without ascites     Antiphospholipid syndrome (HCC)     hypercoagulable state    Anxiety 2013    Arthritis 2013    Painting esophagus 2021    Bipolar 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

## 2024-03-22 ENCOUNTER — HOSPITAL ENCOUNTER (OUTPATIENT)
Age: 44
Setting detail: SPECIMEN
Discharge: HOME | End: 2024-03-22
Payer: COMMERCIAL

## 2024-03-22 LAB
ALBUMIN SERPL-MCNC: 2.9 G/DL (ref 3.5–5.2)
ALBUMIN/GLOB SERPL: 1 {RATIO} (ref 1–2.5)
ALP SERPL-CCNC: 324 U/L (ref 35–104)
ALT SERPL-CCNC: 118 U/L (ref 10–35)
ANION GAP SERPL CALCULATED.3IONS-SCNC: 11 MMOL/L (ref 9–16)
AST SERPL-CCNC: 365 U/L (ref 10–35)
BILIRUB DIRECT SERPL-MCNC: <0.2 MG/DL (ref 0–0.3)
BILIRUB INDIRECT SERPL-MCNC: 0.1 MG/DL (ref 0–1)
BILIRUB SERPL-MCNC: <0.2 MG/DL (ref 0–1.2)
BUN SERPL-MCNC: 9 MG/DL (ref 6–20)
CALCIUM SERPL-MCNC: 8.6 MG/DL (ref 8.6–10.4)
CHLORIDE SERPL-SCNC: 104 MMOL/L (ref 98–107)
CK SERPL-CCNC: 31 U/L (ref 26–192)
CO2 SERPL-SCNC: 24 MMOL/L (ref 20–31)
CREAT SERPL-MCNC: 0.3 MG/DL (ref 0.5–0.9)
ERYTHROCYTE [DISTWIDTH] IN BLOOD BY AUTOMATED COUNT: 18.1 % (ref 11.8–14.4)
GFR SERPL CREATININE-BSD FRML MDRD: >60 ML/MIN/1.73M2
GLOBULIN SER CALC-MCNC: 2.9 G/DL
GLUCOSE SERPL-MCNC: 163 MG/DL (ref 74–99)
HCT VFR BLD AUTO: 39.2 % (ref 36.3–47.1)
HGB BLD-MCNC: 11.9 G/DL (ref 11.9–15.1)
MCH RBC QN AUTO: 26.3 PG (ref 25.2–33.5)
MCHC RBC AUTO-ENTMCNC: 30.4 G/DL (ref 28.4–34.8)
MCV RBC AUTO: 86.5 FL (ref 82.6–102.9)
NRBC BLD-RTO: 0 PER 100 WBC
PLATELET # BLD AUTO: ABNORMAL K/UL (ref 138–453)
PLATELET, FLUORESCENCE: ABNORMAL K/UL (ref 138–453)
POTASSIUM SERPL-SCNC: 4.2 MMOL/L (ref 3.7–5.3)
PROT SERPL-MCNC: 5.8 G/DL (ref 6.6–8.7)
RBC # BLD AUTO: 4.53 M/UL (ref 3.95–5.11)
SODIUM SERPL-SCNC: 139 MMOL/L (ref 136–145)
WBC OTHER # BLD: 4.2 K/UL (ref 3.5–11.3)

## 2024-03-22 PROCEDURE — 80048 BASIC METABOLIC PNL TOTAL CA: CPT

## 2024-03-22 PROCEDURE — 80076 HEPATIC FUNCTION PANEL: CPT

## 2024-03-22 PROCEDURE — 85027 COMPLETE CBC AUTOMATED: CPT

## 2024-03-22 PROCEDURE — 82550 ASSAY OF CK (CPK): CPT

## 2024-03-22 PROCEDURE — 36415 COLL VENOUS BLD VENIPUNCTURE: CPT

## 2024-03-22 PROCEDURE — 85055 RETICULATED PLATELET ASSAY: CPT

## 2024-03-22 PROCEDURE — P9603 ONE-WAY ALLOW PRORATED MILES: HCPCS

## 2024-03-23 NOTE — DISCHARGE INSTRUCTIONS
[facility]                            Visit  Discharge Instructions / Physician Orders  DATE:3/26/24     Home Care:[facility]     SUPPLIES ORDERED THRU:                     DATE LAST SUPPLIED     Wound Location: LEFT lat THIGH, LEFT HIP                                  Left inner thigh x 2                                 Right upper back  Cleanse with: Vashe all wounds     Dressing Orders:  VASHE moist to moist dressing to both wounds DO NOT SUBSTITUTE ANY FOR THE VASHE                                  Left Lateral thigh-  4\" kerlix 1 long piece and moisten with VASHE cover with  silicone dressings ( may need 3-4) tape is tearing skin up                                 Right upper Buttock, Left inner thigh x2 apply Maria D then Silicone foam dressing to area                Frequency:  Left Hip change 2 x per day                       Left Lat Thigh- 2 x per day                       Right upper buttock, Left inner thigh x2 change daily     Additional Orders: Increase protein to diet (meat, cheese, eggs, fish, peanut butter, nuts and beans)  Multivitamin daily      OFFLOADING [x] YES  TYPE:                  [] NA  Make sure she has an offloading air mattress to bed  No more than 2 hours up in Chair  Weekly wound care visits until determined otherwise.     Antibiotic therapy-wound care related YES [x] NO [] NA[]  Per [facility]  MY CHART []     Smart Device  []      HYPERBARIC TREATMENT-                TREATMENT #                          Your next appointment with the Wound Care Center is in  1 week with Heidi                                                                                                    2 weeks                                                                                                    3 weeks                                                                                                    4  weeks

## 2024-03-25 ENCOUNTER — HOSPITAL ENCOUNTER (OUTPATIENT)
Age: 44
Setting detail: SPECIMEN
Discharge: HOME | End: 2024-03-25
Payer: COMMERCIAL

## 2024-03-25 LAB
ANION GAP SERPL CALCULATED.3IONS-SCNC: 9 MMOL/L (ref 9–16)
BUN SERPL-MCNC: 13 MG/DL (ref 6–20)
CALCIUM SERPL-MCNC: 8.7 MG/DL (ref 8.6–10.4)
CHLORIDE SERPL-SCNC: 106 MMOL/L (ref 98–107)
CO2 SERPL-SCNC: 25 MMOL/L (ref 20–31)
CREAT SERPL-MCNC: 0.4 MG/DL (ref 0.5–0.9)
GFR SERPL CREATININE-BSD FRML MDRD: >90 ML/MIN/1.73M2
GLUCOSE SERPL-MCNC: 236 MG/DL (ref 74–99)
POTASSIUM SERPL-SCNC: 4.3 MMOL/L (ref 3.7–5.3)
SODIUM SERPL-SCNC: 140 MMOL/L (ref 136–145)

## 2024-03-25 PROCEDURE — 80048 BASIC METABOLIC PNL TOTAL CA: CPT

## 2024-03-25 PROCEDURE — P9603 ONE-WAY ALLOW PRORATED MILES: HCPCS

## 2024-03-25 PROCEDURE — 36415 COLL VENOUS BLD VENIPUNCTURE: CPT

## 2024-03-27 ENCOUNTER — HOSPITAL ENCOUNTER (OUTPATIENT)
Dept: WOUND CARE | Age: 44
Discharge: HOME | End: 2024-03-27
Payer: COMMERCIAL

## 2024-03-27 VITALS
WEIGHT: 199 LBS | TEMPERATURE: 97.7 F | DIASTOLIC BLOOD PRESSURE: 70 MMHG | HEIGHT: 68 IN | HEART RATE: 80 BPM | RESPIRATION RATE: 18 BRPM | BODY MASS INDEX: 30.16 KG/M2 | SYSTOLIC BLOOD PRESSURE: 121 MMHG

## 2024-03-27 DIAGNOSIS — S71.002D: ICD-10-CM

## 2024-03-27 DIAGNOSIS — S71.102D: ICD-10-CM

## 2024-03-27 DIAGNOSIS — L89.223 PRESSURE INJURY OF LEFT THIGH, STAGE 3 (HCC): ICD-10-CM

## 2024-03-27 DIAGNOSIS — E11.42 TYPE 2 DIABETES MELLITUS WITH DIABETIC POLYNEUROPATHY, WITH LONG-TERM CURRENT USE OF INSULIN (HCC): ICD-10-CM

## 2024-03-27 DIAGNOSIS — L89.313 PRESSURE ULCER OF RIGHT BUTTOCK, STAGE 3 (HCC): ICD-10-CM

## 2024-03-27 DIAGNOSIS — Z79.4 TYPE 2 DIABETES MELLITUS WITH DIABETIC POLYNEUROPATHY, WITH LONG-TERM CURRENT USE OF INSULIN (HCC): ICD-10-CM

## 2024-03-27 DIAGNOSIS — S71.102D OPEN THIGH WOUND, LEFT, SUBSEQUENT ENCOUNTER: ICD-10-CM

## 2024-03-27 DIAGNOSIS — T81.89XD NONHEALING SURGICAL WOUND, SUBSEQUENT ENCOUNTER: Primary | ICD-10-CM

## 2024-03-27 PROCEDURE — 11045 DBRDMT SUBQ TISS EACH ADDL: CPT

## 2024-03-27 PROCEDURE — 11042 DBRDMT SUBQ TIS 1ST 20SQCM/<: CPT | Performed by: NURSE PRACTITIONER

## 2024-03-27 PROCEDURE — 11045 DBRDMT SUBQ TISS EACH ADDL: CPT | Performed by: NURSE PRACTITIONER

## 2024-03-27 PROCEDURE — 11042 DBRDMT SUBQ TIS 1ST 20SQCM/<: CPT

## 2024-03-27 RX ORDER — LIDOCAINE HYDROCHLORIDE 40 MG/ML
SOLUTION TOPICAL ONCE
OUTPATIENT
Start: 2024-03-27 | End: 2024-03-27

## 2024-03-27 RX ORDER — LIDOCAINE HYDROCHLORIDE 20 MG/ML
JELLY TOPICAL ONCE
OUTPATIENT
Start: 2024-03-27 | End: 2024-03-27

## 2024-03-27 RX ORDER — LIDOCAINE HYDROCHLORIDE 40 MG/ML
SOLUTION TOPICAL ONCE
Status: COMPLETED | OUTPATIENT
Start: 2024-03-27 | End: 2024-03-27

## 2024-03-27 RX ADMIN — LIDOCAINE HYDROCHLORIDE 10 ML: 40 SOLUTION TOPICAL at 10:34

## 2024-03-27 ASSESSMENT — PAIN DESCRIPTION - FREQUENCY: FREQUENCY: INTERMITTENT

## 2024-03-27 ASSESSMENT — PAIN SCALES - GENERAL: PAINLEVEL_OUTOF10: 5

## 2024-03-27 ASSESSMENT — PAIN DESCRIPTION - ORIENTATION: ORIENTATION: LEFT

## 2024-03-27 ASSESSMENT — PAIN DESCRIPTION - ONSET: ONSET: ON-GOING

## 2024-03-27 ASSESSMENT — PAIN - FUNCTIONAL ASSESSMENT: PAIN_FUNCTIONAL_ASSESSMENT: PREVENTS OR INTERFERES SOME ACTIVE ACTIVITIES AND ADLS

## 2024-03-27 ASSESSMENT — PAIN DESCRIPTION - DESCRIPTORS: DESCRIPTORS: BURNING;SORE

## 2024-03-27 ASSESSMENT — PAIN DESCRIPTION - LOCATION: LOCATION: LEG

## 2024-03-27 ASSESSMENT — PAIN DESCRIPTION - PAIN TYPE: TYPE: CHRONIC PAIN;SURGICAL PAIN

## 2024-03-27 NOTE — PROGRESS NOTES
(cm^3) 837.375 cm^3 03/27/24 1027   Wound Healing % -553 03/27/24 1027   Post-Procedure Length (cm) 16.5 cm 03/27/24 1027   Post-Procedure Width (cm) 14.5 cm 03/27/24 1027   Post-Procedure Depth (cm) 3.5 cm 03/27/24 1027   Post-Procedure Surface Area (cm^2) 239.25 cm^2 03/27/24 1027   Post-Procedure Volume (cm^3) 837.375 cm^3 03/27/24 1027   Distance Tunneling (cm) 5.4 cm 03/27/24 1027   Tunneling Position ___ O'Clock 7 03/27/24 1027   Wound Assessment Hyper granulation tissue;Pink/red 03/27/24 1027   Drainage Amount Moderate (25-50%) 03/27/24 1027   Drainage Description Serosanguinous;Yellow 03/27/24 1027   Odor None 03/27/24 1027   Valerie-wound Assessment Blanchable erythema 03/27/24 1027   Margins Defined edges 03/27/24 1027   Wound Thickness Description not for Pressure Injury Full thickness 03/27/24 1027   Number of days: 27       Wound 02/28/24 Buttocks Right;Upper #4 (Active)   Wound Image   03/27/24 1027   Wound Etiology Pressure Stage 3 03/27/24 1027   Dressing Status New drainage noted;Old drainage noted 03/27/24 1027   Wound Cleansed Soap and water 03/27/24 1027   Dressing/Treatment Other (comment) 03/13/24 1045   Wound Length (cm) 1.3 cm 03/27/24 1027   Wound Width (cm) 0.8 cm 03/27/24 1027   Wound Depth (cm) 0.1 cm 03/27/24 1027   Wound Surface Area (cm^2) 1.04 cm^2 03/27/24 1027   Change in Wound Size % (l*w) 25.71 03/27/24 1027   Wound Volume (cm^3) 0.104 cm^3 03/27/24 1027   Wound Healing % 26 03/27/24 1027   Post-Procedure Length (cm) 1.3 cm 03/27/24 1027   Post-Procedure Width (cm) 0.8 cm 03/27/24 1027   Post-Procedure Depth (cm) 0.1 cm 03/27/24 1027   Post-Procedure Surface Area (cm^2) 1.04 cm^2 03/27/24 1027   Post-Procedure Volume (cm^3) 0.104 cm^3 03/27/24 1027   Wound Assessment Pink/red;Slough 03/27/24 1027   Drainage Amount Moderate (25-50%) 03/27/24 1027   Drainage Description Serosanguinous 03/27/24 1027   Odor None 03/27/24 1027   Valerie-wound Assessment Blanchable erythema 03/27/24 1027

## 2024-03-28 ENCOUNTER — HOSPITAL ENCOUNTER (OUTPATIENT)
Age: 44
Setting detail: SPECIMEN
Discharge: HOME | End: 2024-03-28

## 2024-03-28 LAB
ALBUMIN SERPL-MCNC: 2.7 G/DL (ref 3.5–5.2)
ALBUMIN/GLOB SERPL: 1 {RATIO} (ref 1–2.5)
ALP SERPL-CCNC: 193 U/L (ref 35–104)
ALT SERPL-CCNC: 28 U/L (ref 10–35)
ANION GAP SERPL CALCULATED.3IONS-SCNC: 10 MMOL/L (ref 9–16)
AST SERPL-CCNC: 40 U/L (ref 10–35)
BASOPHILS # BLD: 0.05 K/UL (ref 0–0.2)
BASOPHILS NFR BLD: 1 % (ref 0–2)
BILIRUB DIRECT SERPL-MCNC: <0.2 MG/DL (ref 0–0.3)
BILIRUB INDIRECT SERPL-MCNC: 0.1 MG/DL (ref 0–1)
BILIRUB SERPL-MCNC: <0.2 MG/DL (ref 0–1.2)
BUN SERPL-MCNC: 13 MG/DL (ref 6–20)
CALCIUM SERPL-MCNC: 8.6 MG/DL (ref 8.6–10.4)
CHLORIDE SERPL-SCNC: 105 MMOL/L (ref 98–107)
CK SERPL-CCNC: 34 U/L (ref 26–192)
CO2 SERPL-SCNC: 23 MMOL/L (ref 20–31)
CREAT SERPL-MCNC: 0.5 MG/DL (ref 0.5–0.9)
EOSINOPHIL # BLD: 0.34 K/UL (ref 0–0.44)
EOSINOPHILS RELATIVE PERCENT: 6 % (ref 1–4)
ERYTHROCYTE [DISTWIDTH] IN BLOOD BY AUTOMATED COUNT: 17.7 % (ref 11.8–14.4)
GFR SERPL CREATININE-BSD FRML MDRD: >90 ML/MIN/1.73M2
GLOBULIN SER CALC-MCNC: 2.7 G/DL
GLUCOSE SERPL-MCNC: 294 MG/DL (ref 74–99)
HCT VFR BLD AUTO: 33.2 % (ref 36.3–47.1)
HGB BLD-MCNC: 9.9 G/DL (ref 11.9–15.1)
IMM GRANULOCYTES # BLD AUTO: <0.03 K/UL (ref 0–0.3)
IMM GRANULOCYTES NFR BLD: 0 %
LYMPHOCYTES NFR BLD: 1.12 K/UL (ref 1.1–3.7)
LYMPHOCYTES RELATIVE PERCENT: 20 % (ref 24–43)
MCH RBC QN AUTO: 26.6 PG (ref 25.2–33.5)
MCHC RBC AUTO-ENTMCNC: 29.8 G/DL (ref 28.4–34.8)
MCV RBC AUTO: 89.2 FL (ref 82.6–102.9)
MONOCYTES NFR BLD: 0.4 K/UL (ref 0.1–1.2)
MONOCYTES NFR BLD: 7 % (ref 3–12)
NEUTROPHILS NFR BLD: 65 % (ref 36–65)
NEUTS SEG NFR BLD: 3.55 K/UL (ref 1.5–8.1)
NRBC BLD-RTO: 0 PER 100 WBC
PLATELET # BLD AUTO: ABNORMAL K/UL (ref 138–453)
PLATELET, FLUORESCENCE: ABNORMAL K/UL (ref 138–453)
POTASSIUM SERPL-SCNC: 4.3 MMOL/L (ref 3.7–5.3)
PROT SERPL-MCNC: 5.4 G/DL (ref 6.6–8.7)
RBC # BLD AUTO: 3.72 M/UL (ref 3.95–5.11)
RBC # BLD: ABNORMAL 10*6/UL
SODIUM SERPL-SCNC: 138 MMOL/L (ref 136–145)
WBC OTHER # BLD: 5.5 K/UL (ref 3.5–11.3)

## 2024-03-28 PROCEDURE — 82550 ASSAY OF CK (CPK): CPT

## 2024-03-28 PROCEDURE — 85055 RETICULATED PLATELET ASSAY: CPT

## 2024-03-28 PROCEDURE — 36415 COLL VENOUS BLD VENIPUNCTURE: CPT

## 2024-03-28 PROCEDURE — 85025 COMPLETE CBC W/AUTO DIFF WBC: CPT

## 2024-03-28 PROCEDURE — 80048 BASIC METABOLIC PNL TOTAL CA: CPT

## 2024-03-28 PROCEDURE — P9603 ONE-WAY ALLOW PRORATED MILES: HCPCS

## 2024-03-28 PROCEDURE — 80076 HEPATIC FUNCTION PANEL: CPT

## 2024-04-03 ENCOUNTER — HOSPITAL ENCOUNTER (OUTPATIENT)
Dept: WOUND CARE | Age: 44
Discharge: HOME | End: 2024-04-03
Payer: COMMERCIAL

## 2024-04-03 VITALS
TEMPERATURE: 98.7 F | RESPIRATION RATE: 18 BRPM | BODY MASS INDEX: 30.16 KG/M2 | WEIGHT: 199 LBS | SYSTOLIC BLOOD PRESSURE: 116 MMHG | HEIGHT: 68 IN | HEART RATE: 93 BPM | DIASTOLIC BLOOD PRESSURE: 67 MMHG

## 2024-04-03 DIAGNOSIS — E11.42 TYPE 2 DIABETES MELLITUS WITH DIABETIC POLYNEUROPATHY, WITH LONG-TERM CURRENT USE OF INSULIN (HCC): ICD-10-CM

## 2024-04-03 DIAGNOSIS — S71.002D: ICD-10-CM

## 2024-04-03 DIAGNOSIS — T81.89XD NONHEALING SURGICAL WOUND, SUBSEQUENT ENCOUNTER: Primary | ICD-10-CM

## 2024-04-03 DIAGNOSIS — Z79.4 TYPE 2 DIABETES MELLITUS WITH DIABETIC POLYNEUROPATHY, WITH LONG-TERM CURRENT USE OF INSULIN (HCC): ICD-10-CM

## 2024-04-03 DIAGNOSIS — S71.102D OPEN THIGH WOUND, LEFT, SUBSEQUENT ENCOUNTER: ICD-10-CM

## 2024-04-03 DIAGNOSIS — L89.223 PRESSURE INJURY OF LEFT THIGH, STAGE 3 (HCC): ICD-10-CM

## 2024-04-03 DIAGNOSIS — S71.102D: ICD-10-CM

## 2024-04-03 DIAGNOSIS — L89.313 PRESSURE ULCER OF RIGHT BUTTOCK, STAGE 3 (HCC): ICD-10-CM

## 2024-04-03 PROCEDURE — 11042 DBRDMT SUBQ TIS 1ST 20SQCM/<: CPT | Performed by: NURSE PRACTITIONER

## 2024-04-03 PROCEDURE — 11045 DBRDMT SUBQ TISS EACH ADDL: CPT

## 2024-04-03 PROCEDURE — 11045 DBRDMT SUBQ TISS EACH ADDL: CPT | Performed by: NURSE PRACTITIONER

## 2024-04-03 PROCEDURE — 11042 DBRDMT SUBQ TIS 1ST 20SQCM/<: CPT

## 2024-04-03 RX ORDER — LIDOCAINE HYDROCHLORIDE 40 MG/ML
SOLUTION TOPICAL ONCE
OUTPATIENT
Start: 2024-04-03 | End: 2024-04-03

## 2024-04-03 RX ORDER — LIDOCAINE HYDROCHLORIDE 40 MG/ML
SOLUTION TOPICAL ONCE
Status: COMPLETED | OUTPATIENT
Start: 2024-04-03 | End: 2024-04-03

## 2024-04-03 RX ORDER — LIDOCAINE HYDROCHLORIDE 20 MG/ML
JELLY TOPICAL ONCE
OUTPATIENT
Start: 2024-04-03 | End: 2024-04-03

## 2024-04-03 RX ADMIN — LIDOCAINE HYDROCHLORIDE 10 ML: 40 SOLUTION TOPICAL at 10:07

## 2024-04-03 ASSESSMENT — PAIN DESCRIPTION - DESCRIPTORS: DESCRIPTORS: BURNING;SHARP

## 2024-04-03 ASSESSMENT — ENCOUNTER SYMPTOMS
SHORTNESS OF BREATH: 0
VOMITING: 0
ABDOMINAL PAIN: 0
DIARRHEA: 0
NAUSEA: 0
COUGH: 0

## 2024-04-03 ASSESSMENT — PAIN DESCRIPTION - ORIENTATION: ORIENTATION: LEFT

## 2024-04-03 ASSESSMENT — PAIN - FUNCTIONAL ASSESSMENT: PAIN_FUNCTIONAL_ASSESSMENT: PREVENTS OR INTERFERES SOME ACTIVE ACTIVITIES AND ADLS

## 2024-04-03 ASSESSMENT — PAIN DESCRIPTION - ONSET: ONSET: ON-GOING

## 2024-04-03 ASSESSMENT — PAIN DESCRIPTION - LOCATION: LOCATION: HIP;LEG

## 2024-04-03 ASSESSMENT — PAIN DESCRIPTION - PAIN TYPE: TYPE: CHRONIC PAIN

## 2024-04-03 ASSESSMENT — PAIN DESCRIPTION - FREQUENCY: FREQUENCY: INTERMITTENT

## 2024-04-03 ASSESSMENT — PAIN SCALES - GENERAL: PAINLEVEL_OUTOF10: 5

## 2024-04-03 NOTE — DISCHARGE INSTRUCTIONS
[facility]                            Visit  Discharge Instructions / Physician Orders  DATE:3/26/24     Home Care:[facility]     SUPPLIES ORDERED THRU:                     DATE LAST SUPPLIED     Wound Location: LEFT lat THIGH, LEFT HIP                                  Left inner thigh x 2                                 Right upper back  Cleanse with: Vashe all wounds     Dressing Orders:Left post hip-tuck silver carlie rope into tunnel at 10:00 it goes 6.5 cm then cover the rest of the wound with Vashe moist to moist dressing                                Left inner thigh and Right buttock- lay mayo over top and cover with silicone border dressing                                L thigh anterior- vashe moist to moist dressing cover with silicone border dressing               Frequency:  Left Hip change 2 x per day                       Left Lat Thigh- 2 x per day                       Right upper buttock, Left inner thigh x2 change daily     Additional Orders: Increase protein to diet (meat, cheese, eggs, fish, peanut butter, nuts and beans)  Multivitamin daily      OFFLOADING [x] YES  TYPE:                  [] NA  Make sure she has an offloading air mattress to bed  No more than 2 hours up in Chair  Weekly wound care visits until determined otherwise.     Antibiotic therapy-wound care related YES [x] NO [] NA[]  Per [facility]  MY CHART []     Smart Device  []      HYPERBARIC TREATMENT-                TREATMENT #                          Your next appointment with the Wound Care Center is in  1 week with Heidi                                                                                                                                                                                                    (Please note your next appointment above and if you are unable to keep, kindly give a 24 hour notice. Thank you.)  If more than 15 min late we cannot guarantee you will

## 2024-04-03 NOTE — PROGRESS NOTES
[facility]   Progress Note and Procedure Note      Krista Chiang  MEDICAL RECORD NUMBER:  [id number]  AGE: 43 y.o.   GENDER: female  : 1980  EPISODE DATE:  4/3/2024    Subjective:     Chief Complaint   Patient presents with    Wound Check     Left thigh, left hip, right buttock         HISTORY of PRESENT ILLNESS HPI     Krista Chiang is a 43 y.o. female who presents today for wound/ulcer evaluation.   History of Wound Context: here to follow up on multiple wounds on left thigh, left hip, and right upper buttocks.   Had left hip arthroplasty with infection and dehiscence after. Has had multiple debridements of this wound with last being 2024 at [facility]. She was discharged from [facility] 2024 to [facility] (to be close to home) with wound vac on wound. Once at [facility] developed eschar so wound vac was discontinued. [facility] plastic surgery did confirm that she can follow at local wound clinic as she is unable to get transportation to their facility. She finished IV antibiotics which are being managed by ID at [facility] with labs weekly.   Has appointment with Dr Krishna ruiz at [facility] on 2024.   Wound/Ulcer Pain Timing/Severity: constant  Quality of pain: burning, throbbing  Severity:  5 / 10   Modifying Factors: Pain worsens with touching  Associated Signs/Symptoms: none    Ulcer Identification:  Ulcer Type: pressure and non-healing surgical  Contributing Factors: diabetes, chronic pressure, decreased mobility, obesity, immunosuppression, malnutrition, and substance abuse         PAST MEDICAL HISTORY        Diagnosis Date    Alcohol abuse     Recurrent episodes of withdrawal    Alcoholic hepatitis without ascites     Antiphospholipid syndrome (HCC)     hypercoagulable state    Anxiety 2013    Arthritis 2013    Painting esophagus 2021    Bipolar 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

## 2024-04-04 ENCOUNTER — APPOINTMENT (OUTPATIENT)
Dept: WOUND CARE | Age: 44
End: 2024-04-04
Payer: COMMERCIAL

## 2024-04-04 ENCOUNTER — HOSPITAL ENCOUNTER (OUTPATIENT)
Age: 44
Setting detail: SPECIMEN
Discharge: HOME | End: 2024-04-04

## 2024-04-04 LAB
ALBUMIN SERPL-MCNC: 2.7 G/DL (ref 3.5–5.2)
ALBUMIN/GLOB SERPL: 1 {RATIO} (ref 1–2.5)
ALP SERPL-CCNC: 206 U/L (ref 35–104)
ALT SERPL-CCNC: 48 U/L (ref 10–35)
ANION GAP SERPL CALCULATED.3IONS-SCNC: 12 MMOL/L (ref 9–16)
AST SERPL-CCNC: 47 U/L (ref 10–35)
BASOPHILS # BLD: 0.06 K/UL (ref 0–0.2)
BASOPHILS NFR BLD: 1 % (ref 0–2)
BILIRUB DIRECT SERPL-MCNC: <0.2 MG/DL (ref 0–0.3)
BILIRUB INDIRECT SERPL-MCNC: 0.2 MG/DL (ref 0–1)
BILIRUB SERPL-MCNC: 0.2 MG/DL (ref 0–1.2)
BUN SERPL-MCNC: 16 MG/DL (ref 6–20)
CALCIUM SERPL-MCNC: 8.7 MG/DL (ref 8.6–10.4)
CHLORIDE SERPL-SCNC: 106 MMOL/L (ref 98–107)
CK SERPL-CCNC: 32 U/L (ref 26–192)
CO2 SERPL-SCNC: 18 MMOL/L (ref 20–31)
CREAT SERPL-MCNC: 0.3 MG/DL (ref 0.5–0.9)
EOSINOPHIL # BLD: 0.41 K/UL (ref 0–0.44)
EOSINOPHILS RELATIVE PERCENT: 7 % (ref 1–4)
ERYTHROCYTE [DISTWIDTH] IN BLOOD BY AUTOMATED COUNT: 17.1 % (ref 11.8–14.4)
GFR SERPL CREATININE-BSD FRML MDRD: >90 ML/MIN/1.73M2
GLOBULIN SER CALC-MCNC: 3.4 G/DL
GLUCOSE SERPL-MCNC: 184 MG/DL (ref 74–99)
HCT VFR BLD AUTO: 37.1 % (ref 36.3–47.1)
HGB BLD-MCNC: 11 G/DL (ref 11.9–15.1)
IMM GRANULOCYTES # BLD AUTO: <0.03 K/UL (ref 0–0.3)
IMM GRANULOCYTES NFR BLD: 0 %
LYMPHOCYTES NFR BLD: 1.78 K/UL (ref 1.1–3.7)
LYMPHOCYTES RELATIVE PERCENT: 28 % (ref 24–43)
MCH RBC QN AUTO: 26.8 PG (ref 25.2–33.5)
MCHC RBC AUTO-ENTMCNC: 29.6 G/DL (ref 28.4–34.8)
MCV RBC AUTO: 90.3 FL (ref 82.6–102.9)
MONOCYTES NFR BLD: 0.54 K/UL (ref 0.1–1.2)
MONOCYTES NFR BLD: 9 % (ref 3–12)
NEUTROPHILS NFR BLD: 55 % (ref 36–65)
NEUTS SEG NFR BLD: 3.47 K/UL (ref 1.5–8.1)
NRBC BLD-RTO: 0 PER 100 WBC
PLATELET # BLD AUTO: 325 K/UL (ref 138–453)
PMV BLD AUTO: 11.8 FL (ref 8.1–13.5)
POTASSIUM SERPL-SCNC: 4.5 MMOL/L (ref 3.7–5.3)
PROT SERPL-MCNC: 6.1 G/DL (ref 6.6–8.7)
RBC # BLD AUTO: 4.11 M/UL (ref 3.95–5.11)
RBC # BLD: ABNORMAL 10*6/UL
SODIUM SERPL-SCNC: 136 MMOL/L (ref 136–145)
WBC OTHER # BLD: 6.3 K/UL (ref 3.5–11.3)

## 2024-04-04 PROCEDURE — 36415 COLL VENOUS BLD VENIPUNCTURE: CPT

## 2024-04-04 PROCEDURE — P9603 ONE-WAY ALLOW PRORATED MILES: HCPCS

## 2024-04-04 PROCEDURE — 85025 COMPLETE CBC W/AUTO DIFF WBC: CPT

## 2024-04-04 PROCEDURE — 82550 ASSAY OF CK (CPK): CPT

## 2024-04-04 PROCEDURE — 80076 HEPATIC FUNCTION PANEL: CPT

## 2024-04-04 PROCEDURE — 80048 BASIC METABOLIC PNL TOTAL CA: CPT

## 2024-04-07 NOTE — DISCHARGE INSTRUCTIONS
[facility]                            Visit  Discharge Instructions / Physician Orders  DATE:4/10/24     Home Care:[facility]     SUPPLIES ORDERED THRU:                     DATE LAST SUPPLIED     Wound Location: LEFT lat THIGH, LEFT HIP                                  Left inner thigh x 2                                 Right upper back  Cleanse with: Vashe all wounds     Dressing Orders:Left post hip-tuck silver carlie rope into tunnel at 10:00 it goes 6.5 cm then cover the rest of the wound with Vashe moist to moist dressing                                Left inner thigh and Right buttock- lay mayo over top and cover with silicone border dressing                                L thigh anterior- vashe moist to moist dressing cover with silicone border dressing               Frequency:  Left Hip change 2 x per day                       Left Lat Thigh- 2 x per day                       Right upper buttock, Left inner thigh x2 change daily     Additional Orders: Increase protein to diet (meat, cheese, eggs, fish, peanut butter, nuts and beans)  Multivitamin daily      OFFLOADING [x] YES  TYPE:                  [] NA  Make sure she has an offloading air mattress to bed  No more than 2 hours up in Chair  Weekly wound care visits until determined otherwise.     Antibiotic therapy-wound care related YES [x] NO [] NA[]  Per [facility]  MY CHART []     Smart Device  []      HYPERBARIC TREATMENT-                TREATMENT #                          Your next appointment with the Wound Care Center is in  1 week with Heidi                                                                                                                                                                                                    (Please note your next appointment above and if you are unable to keep, kindly give a 24 hour notice. Thank you.)  If more than 15 min late we cannot guarantee you will

## 2024-04-08 ENCOUNTER — HOSPITAL ENCOUNTER (OUTPATIENT)
Age: 44
Setting detail: SPECIMEN
Discharge: HOME | End: 2024-04-08

## 2024-04-08 PROCEDURE — 36415 COLL VENOUS BLD VENIPUNCTURE: CPT

## 2024-04-08 PROCEDURE — P9603 ONE-WAY ALLOW PRORATED MILES: HCPCS

## 2024-04-10 ENCOUNTER — HOSPITAL ENCOUNTER (OUTPATIENT)
Dept: WOUND CARE | Age: 44
Discharge: HOME | End: 2024-04-10
Payer: COMMERCIAL

## 2024-04-10 VITALS
SYSTOLIC BLOOD PRESSURE: 115 MMHG | DIASTOLIC BLOOD PRESSURE: 71 MMHG | WEIGHT: 199 LBS | HEART RATE: 77 BPM | TEMPERATURE: 98.3 F | RESPIRATION RATE: 18 BRPM | HEIGHT: 68 IN | BODY MASS INDEX: 30.16 KG/M2

## 2024-04-10 DIAGNOSIS — S71.102D OPEN THIGH WOUND, LEFT, SUBSEQUENT ENCOUNTER: ICD-10-CM

## 2024-04-10 DIAGNOSIS — L89.223 PRESSURE INJURY OF LEFT THIGH, STAGE 3 (HCC): ICD-10-CM

## 2024-04-10 DIAGNOSIS — T81.89XD NONHEALING SURGICAL WOUND, SUBSEQUENT ENCOUNTER: Primary | ICD-10-CM

## 2024-04-10 DIAGNOSIS — E11.42 TYPE 2 DIABETES MELLITUS WITH DIABETIC POLYNEUROPATHY, WITH LONG-TERM CURRENT USE OF INSULIN (HCC): ICD-10-CM

## 2024-04-10 DIAGNOSIS — S71.102D: ICD-10-CM

## 2024-04-10 DIAGNOSIS — L89.313 PRESSURE ULCER OF RIGHT BUTTOCK, STAGE 3 (HCC): ICD-10-CM

## 2024-04-10 DIAGNOSIS — Z79.4 TYPE 2 DIABETES MELLITUS WITH DIABETIC POLYNEUROPATHY, WITH LONG-TERM CURRENT USE OF INSULIN (HCC): ICD-10-CM

## 2024-04-10 DIAGNOSIS — S71.002D: ICD-10-CM

## 2024-04-10 PROCEDURE — 11045 DBRDMT SUBQ TISS EACH ADDL: CPT | Performed by: NURSE PRACTITIONER

## 2024-04-10 PROCEDURE — 11042 DBRDMT SUBQ TIS 1ST 20SQCM/<: CPT

## 2024-04-10 PROCEDURE — 11042 DBRDMT SUBQ TIS 1ST 20SQCM/<: CPT | Performed by: NURSE PRACTITIONER

## 2024-04-10 PROCEDURE — 11045 DBRDMT SUBQ TISS EACH ADDL: CPT

## 2024-04-10 RX ORDER — LIDOCAINE HYDROCHLORIDE 20 MG/ML
JELLY TOPICAL ONCE
OUTPATIENT
Start: 2024-04-10 | End: 2024-04-10

## 2024-04-10 RX ORDER — LIDOCAINE HYDROCHLORIDE 40 MG/ML
SOLUTION TOPICAL ONCE
OUTPATIENT
Start: 2024-04-10 | End: 2024-04-10

## 2024-04-10 RX ORDER — LIDOCAINE HYDROCHLORIDE 40 MG/ML
SOLUTION TOPICAL ONCE
Status: COMPLETED | OUTPATIENT
Start: 2024-04-10 | End: 2024-04-10

## 2024-04-10 RX ADMIN — LIDOCAINE HYDROCHLORIDE 40 ML: 40 SOLUTION TOPICAL at 10:45

## 2024-04-10 ASSESSMENT — ENCOUNTER SYMPTOMS
RHINORRHEA: 0
DIARRHEA: 0
SHORTNESS OF BREATH: 0
ABDOMINAL PAIN: 0
VOMITING: 0
COUGH: 0
NAUSEA: 0

## 2024-04-10 ASSESSMENT — PAIN SCALES - GENERAL: PAINLEVEL_OUTOF10: 6

## 2024-04-10 ASSESSMENT — PAIN DESCRIPTION - ONSET: ONSET: ON-GOING

## 2024-04-10 ASSESSMENT — PAIN DESCRIPTION - DESCRIPTORS: DESCRIPTORS: BURNING;SHARP

## 2024-04-10 ASSESSMENT — PAIN DESCRIPTION - PAIN TYPE: TYPE: CHRONIC PAIN

## 2024-04-10 ASSESSMENT — PAIN - FUNCTIONAL ASSESSMENT: PAIN_FUNCTIONAL_ASSESSMENT: PREVENTS OR INTERFERES SOME ACTIVE ACTIVITIES AND ADLS

## 2024-04-10 ASSESSMENT — PAIN DESCRIPTION - ORIENTATION: ORIENTATION: LEFT

## 2024-04-10 ASSESSMENT — PAIN DESCRIPTION - LOCATION: LOCATION: HIP;LEG

## 2024-04-10 ASSESSMENT — PAIN DESCRIPTION - FREQUENCY: FREQUENCY: INTERMITTENT

## 2024-04-10 NOTE — PROGRESS NOTES
Thickness Description not for Pressure Injury Full thickness 04/10/24 1031   Number of days: 42       Wound 02/28/24 Hip Left;Posterior #3 (Active)   Wound Image   03/27/24 1027   Wound Etiology Non-Healing Surgical 04/10/24 1031   Dressing Status New drainage noted;Old drainage noted 04/10/24 1031   Wound Cleansed Soap and water 04/10/24 1031   Dressing/Treatment Other (comment) 04/10/24 1118   Wound Length (cm) 15 cm 04/10/24 1031   Wound Width (cm) 12.5 cm 04/10/24 1031   Wound Depth (cm) 5 cm 04/10/24 1031   Wound Surface Area (cm^2) 187.5 cm^2 04/10/24 1031   Change in Wound Size % (l*w) -31.52 04/10/24 1031   Wound Volume (cm^3) 937.5 cm^3 04/10/24 1031   Wound Healing % -631 04/10/24 1031   Post-Procedure Length (cm) 4.5 cm 04/10/24 1031   Post-Procedure Width (cm) 12.5 cm 04/10/24 1031   Post-Procedure Depth (cm) 6 cm 04/10/24 1031   Post-Procedure Surface Area (cm^2) 56.25 cm^2 04/10/24 1031   Post-Procedure Volume (cm^3) 337.5 cm^3 04/10/24 1031   Distance Tunneling (cm) 6.5 cm 04/03/24 1000   Tunneling Position ___ O'Clock 10:00 04/03/24 1000   Wound Assessment [place]/red;Slough 04/10/24 1031   Drainage Amount Moderate (25-50%) 04/10/24 1031   Drainage Description Serosanguinous;Yellow 04/10/24 1031   Odor None 04/10/24 1031   Valerie-wound Assessment Blanchable erythema 04/10/24 1031   Margins Defined edges 04/10/24 1031   Wound Thickness Description not for Pressure Injury Full thickness 04/10/24 1031   Number of days: 42       Wound 02/28/24 Buttocks Right;Upper #4 (Active)   Wound Image   03/27/24 1027   Wound Etiology Pressure Stage 3 04/10/24 1031   Dressing Status New drainage noted;Old drainage noted 04/10/24 1031   Wound Cleansed Soap and water 04/10/24 1031   Dressing/Treatment Other (comment) 04/10/24 1118   Wound Length (cm) 1.1 cm 04/10/24 1031   Wound Width (cm) 0.7 cm 04/10/24 1031   Wound Depth (cm) 0.1 cm 04/10/24 1031   Wound Surface Area (cm^2) 0.77 cm^2 04/10/24 1031   Change in Wound Size

## 2024-04-12 ENCOUNTER — HOSPITAL ENCOUNTER (OUTPATIENT)
Age: 44
Setting detail: SPECIMEN
Discharge: HOME | End: 2024-04-12

## 2024-04-12 LAB
ALBUMIN SERPL-MCNC: 2.8 G/DL (ref 3.5–5.2)
ALBUMIN/GLOB SERPL: 1 {RATIO} (ref 1–2.5)
ALP SERPL-CCNC: 170 U/L (ref 35–104)
ALT SERPL-CCNC: 34 U/L (ref 10–35)
ANION GAP SERPL CALCULATED.3IONS-SCNC: 9 MMOL/L (ref 9–16)
AST SERPL-CCNC: 40 U/L (ref 10–35)
BASOPHILS # BLD: 0.05 K/UL (ref 0–0.2)
BASOPHILS NFR BLD: 1 % (ref 0–2)
BILIRUB DIRECT SERPL-MCNC: <0.2 MG/DL (ref 0–0.3)
BILIRUB INDIRECT SERPL-MCNC: 0.1 MG/DL (ref 0–1)
BILIRUB SERPL-MCNC: 0.2 MG/DL (ref 0–1.2)
BUN SERPL-MCNC: 14 MG/DL (ref 6–20)
CALCIUM SERPL-MCNC: 8.7 MG/DL (ref 8.6–10.4)
CHLORIDE SERPL-SCNC: 104 MMOL/L (ref 98–107)
CK SERPL-CCNC: 27 U/L (ref 26–192)
CO2 SERPL-SCNC: 24 MMOL/L (ref 20–31)
CREAT SERPL-MCNC: 0.4 MG/DL (ref 0.5–0.9)
EOSINOPHIL # BLD: 0.33 K/UL (ref 0–0.44)
EOSINOPHILS RELATIVE PERCENT: 6 % (ref 1–4)
ERYTHROCYTE [DISTWIDTH] IN BLOOD BY AUTOMATED COUNT: 16.5 % (ref 11.8–14.4)
GFR SERPL CREATININE-BSD FRML MDRD: >90 ML/MIN/1.73M2
GLOBULIN SER CALC-MCNC: 2.7 G/DL
GLUCOSE SERPL-MCNC: 131 MG/DL (ref 74–99)
HCT VFR BLD AUTO: 35.9 % (ref 36.3–47.1)
HGB BLD-MCNC: 10.8 G/DL (ref 11.9–15.1)
IMM GRANULOCYTES # BLD AUTO: <0.03 K/UL (ref 0–0.3)
IMM GRANULOCYTES NFR BLD: 0 %
LYMPHOCYTES NFR BLD: 1.69 K/UL (ref 1.1–3.7)
LYMPHOCYTES RELATIVE PERCENT: 29 % (ref 24–43)
MCH RBC QN AUTO: 26.9 PG (ref 25.2–33.5)
MCHC RBC AUTO-ENTMCNC: 30.1 G/DL (ref 28.4–34.8)
MCV RBC AUTO: 89.5 FL (ref 82.6–102.9)
MONOCYTES NFR BLD: 0.48 K/UL (ref 0.1–1.2)
MONOCYTES NFR BLD: 8 % (ref 3–12)
NEUTROPHILS NFR BLD: 56 % (ref 36–65)
NEUTS SEG NFR BLD: 3.37 K/UL (ref 1.5–8.1)
NRBC BLD-RTO: 0 PER 100 WBC
PLATELET # BLD AUTO: 352 K/UL (ref 138–453)
PMV BLD AUTO: 12.5 FL (ref 8.1–13.5)
POTASSIUM SERPL-SCNC: 4.4 MMOL/L (ref 3.7–5.3)
PROT SERPL-MCNC: 5.5 G/DL (ref 6.6–8.7)
RBC # BLD AUTO: 4.01 M/UL (ref 3.95–5.11)
RBC # BLD: ABNORMAL 10*6/UL
SODIUM SERPL-SCNC: 137 MMOL/L (ref 136–145)
WBC OTHER # BLD: 5.9 K/UL (ref 3.5–11.3)

## 2024-04-12 PROCEDURE — 85025 COMPLETE CBC W/AUTO DIFF WBC: CPT

## 2024-04-12 PROCEDURE — 80076 HEPATIC FUNCTION PANEL: CPT

## 2024-04-12 PROCEDURE — 82550 ASSAY OF CK (CPK): CPT

## 2024-04-12 PROCEDURE — 80048 BASIC METABOLIC PNL TOTAL CA: CPT

## 2024-04-12 PROCEDURE — 36415 COLL VENOUS BLD VENIPUNCTURE: CPT

## 2024-04-12 PROCEDURE — P9603 ONE-WAY ALLOW PRORATED MILES: HCPCS

## 2024-04-13 ENCOUNTER — HOSPITAL ENCOUNTER (EMERGENCY)
Age: 44
Discharge: ANOTHER HOSPITAL | End: 2024-04-14
Attending: EMERGENCY MEDICINE
Payer: COMMERCIAL

## 2024-04-13 ENCOUNTER — APPOINTMENT (OUTPATIENT)
Dept: CT IMAGING | Age: 44
End: 2024-04-13
Payer: COMMERCIAL

## 2024-04-13 DIAGNOSIS — M86.9 OSTEOMYELITIS OF LEFT FEMUR, UNSPECIFIED TYPE (HCC): Primary | ICD-10-CM

## 2024-04-13 DIAGNOSIS — T81.49XA LEFT HIP POSTOPERATIVE WOUND INFECTION: ICD-10-CM

## 2024-04-13 LAB
ALBUMIN SERPL-MCNC: 2.5 G/DL (ref 3.5–5.2)
ALP SERPL-CCNC: 181 U/L (ref 35–104)
ALT SERPL-CCNC: 36 U/L (ref 5–33)
ANION GAP SERPL CALCULATED.3IONS-SCNC: 8 MMOL/L (ref 9–17)
AST SERPL-CCNC: 31 U/L
BASOPHILS # BLD: 0.1 K/UL (ref 0–0.2)
BASOPHILS NFR BLD: 1 % (ref 0–2)
BILIRUB DIRECT SERPL-MCNC: <0.1 MG/DL
BILIRUB INDIRECT SERPL-MCNC: ABNORMAL MG/DL (ref 0–1)
BILIRUB SERPL-MCNC: <0.2 MG/DL (ref 0.3–1.2)
BUN SERPL-MCNC: 14 MG/DL (ref 6–20)
CALCIUM SERPL-MCNC: 7.9 MG/DL (ref 8.6–10.4)
CHLORIDE SERPL-SCNC: 106 MMOL/L (ref 98–107)
CO2 SERPL-SCNC: 24 MMOL/L (ref 20–31)
CREAT SERPL-MCNC: 0.5 MG/DL (ref 0.5–0.9)
EOSINOPHIL # BLD: 0.3 K/UL (ref 0–0.4)
EOSINOPHILS RELATIVE PERCENT: 5 % (ref 0–4)
ERYTHROCYTE [DISTWIDTH] IN BLOOD BY AUTOMATED COUNT: 18.3 % (ref 11.5–14.9)
GFR SERPL CREATININE-BSD FRML MDRD: >90 ML/MIN/1.73M2
GLUCOSE SERPL-MCNC: 192 MG/DL (ref 70–99)
HCT VFR BLD AUTO: 33.3 % (ref 36–46)
HGB BLD-MCNC: 10.5 G/DL (ref 12–16)
LIPASE SERPL-CCNC: 5 U/L (ref 13–60)
LYMPHOCYTES NFR BLD: 1.9 K/UL (ref 1–4.8)
LYMPHOCYTES RELATIVE PERCENT: 34 % (ref 24–44)
MCH RBC QN AUTO: 27.2 PG (ref 26–34)
MCHC RBC AUTO-ENTMCNC: 31.6 G/DL (ref 31–37)
MCV RBC AUTO: 86.2 FL (ref 80–100)
MONOCYTES NFR BLD: 0.4 K/UL (ref 0.1–1.3)
MONOCYTES NFR BLD: 8 % (ref 1–7)
NEUTROPHILS NFR BLD: 52 % (ref 36–66)
NEUTS SEG NFR BLD: 2.8 K/UL (ref 1.3–9.1)
PLATELET # BLD AUTO: 384 K/UL (ref 150–450)
PMV BLD AUTO: 9.1 FL (ref 6–12)
POTASSIUM SERPL-SCNC: 4.3 MMOL/L (ref 3.7–5.3)
PROT SERPL-MCNC: 5.5 G/DL (ref 6.4–8.3)
RBC # BLD AUTO: 3.86 M/UL (ref 4–5.2)
SODIUM SERPL-SCNC: 138 MMOL/L (ref 135–144)
WBC OTHER # BLD: 5.4 K/UL (ref 3.5–11)

## 2024-04-13 PROCEDURE — 6360000004 HC RX CONTRAST MEDICATION: Performed by: EMERGENCY MEDICINE

## 2024-04-13 PROCEDURE — 99285 EMERGENCY DEPT VISIT HI MDM: CPT

## 2024-04-13 PROCEDURE — 85025 COMPLETE CBC W/AUTO DIFF WBC: CPT

## 2024-04-13 PROCEDURE — 73701 CT LOWER EXTREMITY W/DYE: CPT

## 2024-04-13 PROCEDURE — 80076 HEPATIC FUNCTION PANEL: CPT

## 2024-04-13 PROCEDURE — 96374 THER/PROPH/DIAG INJ IV PUSH: CPT

## 2024-04-13 PROCEDURE — 6360000002 HC RX W HCPCS: Performed by: EMERGENCY MEDICINE

## 2024-04-13 PROCEDURE — 96375 TX/PRO/DX INJ NEW DRUG ADDON: CPT

## 2024-04-13 PROCEDURE — 2580000003 HC RX 258: Performed by: EMERGENCY MEDICINE

## 2024-04-13 PROCEDURE — 96367 TX/PROPH/DG ADDL SEQ IV INF: CPT

## 2024-04-13 PROCEDURE — 83690 ASSAY OF LIPASE: CPT

## 2024-04-13 PROCEDURE — 96365 THER/PROPH/DIAG IV INF INIT: CPT

## 2024-04-13 PROCEDURE — 96376 TX/PRO/DX INJ SAME DRUG ADON: CPT

## 2024-04-13 PROCEDURE — 80048 BASIC METABOLIC PNL TOTAL CA: CPT

## 2024-04-13 PROCEDURE — 96366 THER/PROPH/DIAG IV INF ADDON: CPT

## 2024-04-13 PROCEDURE — 36415 COLL VENOUS BLD VENIPUNCTURE: CPT

## 2024-04-13 RX ORDER — LORAZEPAM 2 MG/ML
1 INJECTION INTRAMUSCULAR ONCE
Status: COMPLETED | OUTPATIENT
Start: 2024-04-13 | End: 2024-04-13

## 2024-04-13 RX ORDER — ONDANSETRON 2 MG/ML
4 INJECTION INTRAMUSCULAR; INTRAVENOUS ONCE
Status: COMPLETED | OUTPATIENT
Start: 2024-04-13 | End: 2024-04-13

## 2024-04-13 RX ORDER — SODIUM CHLORIDE 0.9 % (FLUSH) 0.9 %
10 SYRINGE (ML) INJECTION PRN
Status: DISCONTINUED | OUTPATIENT
Start: 2024-04-13 | End: 2024-04-14 | Stop reason: HOSPADM

## 2024-04-13 RX ORDER — 0.9 % SODIUM CHLORIDE 0.9 %
100 INTRAVENOUS SOLUTION INTRAVENOUS ONCE
Status: COMPLETED | OUTPATIENT
Start: 2024-04-13 | End: 2024-04-13

## 2024-04-13 RX ADMIN — SODIUM CHLORIDE 100 ML: 9 INJECTION, SOLUTION INTRAVENOUS at 17:51

## 2024-04-13 RX ADMIN — ONDANSETRON 4 MG: 2 INJECTION INTRAMUSCULAR; INTRAVENOUS at 16:26

## 2024-04-13 RX ADMIN — SODIUM CHLORIDE, PRESERVATIVE FREE 10 ML: 5 INJECTION INTRAVENOUS at 17:51

## 2024-04-13 RX ADMIN — IOPAMIDOL 75 ML: 755 INJECTION, SOLUTION INTRAVENOUS at 17:50

## 2024-04-13 RX ADMIN — VANCOMYCIN HYDROCHLORIDE 2250 MG: 1.25 INJECTION, POWDER, LYOPHILIZED, FOR SOLUTION INTRAVENOUS at 20:31

## 2024-04-13 RX ADMIN — HYDROMORPHONE HYDROCHLORIDE 0.5 MG: 1 INJECTION, SOLUTION INTRAMUSCULAR; INTRAVENOUS; SUBCUTANEOUS at 19:48

## 2024-04-13 RX ADMIN — HYDROMORPHONE HYDROCHLORIDE 0.5 MG: 1 INJECTION, SOLUTION INTRAMUSCULAR; INTRAVENOUS; SUBCUTANEOUS at 22:06

## 2024-04-13 RX ADMIN — LORAZEPAM 1 MG: 2 INJECTION, SOLUTION INTRAMUSCULAR; INTRAVENOUS at 20:48

## 2024-04-13 RX ADMIN — CEFEPIME 2000 MG: 2 INJECTION, POWDER, FOR SOLUTION INTRAVENOUS at 19:56

## 2024-04-13 RX ADMIN — HYDROMORPHONE HYDROCHLORIDE 0.5 MG: 1 INJECTION, SOLUTION INTRAMUSCULAR; INTRAVENOUS; SUBCUTANEOUS at 16:26

## 2024-04-13 ASSESSMENT — PAIN SCALES - GENERAL
PAINLEVEL_OUTOF10: 8
PAINLEVEL_OUTOF10: 5
PAINLEVEL_OUTOF10: 8
PAINLEVEL_OUTOF10: 8
PAINLEVEL_OUTOF10: 9

## 2024-04-13 ASSESSMENT — PAIN DESCRIPTION - ORIENTATION
ORIENTATION: LEFT
ORIENTATION: LEFT

## 2024-04-13 ASSESSMENT — LIFESTYLE VARIABLES
HOW OFTEN DO YOU HAVE A DRINK CONTAINING ALCOHOL: NEVER
HOW MANY STANDARD DRINKS CONTAINING ALCOHOL DO YOU HAVE ON A TYPICAL DAY: PATIENT DOES NOT DRINK

## 2024-04-13 ASSESSMENT — PAIN DESCRIPTION - LOCATION
LOCATION: HIP
LOCATION: HIP

## 2024-04-13 ASSESSMENT — PAIN - FUNCTIONAL ASSESSMENT: PAIN_FUNCTIONAL_ASSESSMENT: 0-10

## 2024-04-13 NOTE — ED NOTES
Ct tech informed writer that IV infiltrated during CT. Informed resident. PEBBLES Hooker in room to try new IV.

## 2024-04-13 NOTE — ED NOTES
Report given to PEBBLES Gray from ED.   Report method in person   The following was reviewed with receiving RN:   Current vital signs:  /62   Pulse 79   Temp 98 °F (36.7 °C)   Resp 12   Ht 1.727 m (5' 8\")   Wt 90.3 kg (199 lb)   SpO2 100%   BMI 30.26 kg/m²                      Any medication or safety alerts were reviewed. Any pending diagnostics and notifications were also reviewed, as well as any safety concerns or issues, abnormal labs, abnormal imaging, and abnormal assessment findings. Questions were answered.   Nurse aware of infiltrated IV and in process of getting a new one. Pending medications reviewed.

## 2024-04-13 NOTE — ED PROVIDER NOTES
[facility] ED  eMERGENCY dEPARTMENT eNCOUnter   Attending Attestation     Pt Name: Krista Chiang  MRN: [id number]  Birthdate 1980  Date of evaluation: 4/13/24       Krista Chiang is a 43 y.o. female who presents with Wound Check      History:   Hip pain is getting worse.  Patient has a history of multiple infections and surgeries after a hip replacement on the left.  Patient has had multiple infections.  Patient is following with wound care and they feel like the tract that she has in the upper part of the hip wound is getting bigger and patient states the pain is becoming unbearable.    Exam: Vitals:   Vitals:    04/13/24 1524 04/13/24 1527 04/13/24 1528   BP:  124/78    Pulse:  89    Resp:   13   Temp: 98 °F (36.7 °C) 98 °F (36.7 °C)    SpO2:  100%    Weight:  90.3 kg (199 lb)    Height:  1.727 m (5' 8\")      Please see the pictures for details of the wounds.    I performed a history and physical examination of the patient and discussed management with the resident. I reviewed the resident’s note and agree with the documented findings and plan of care. Any areas of disagreement are noted on the chart. I was personally present for the key portions of any procedures. I have documented in the chart those procedures where I was not present during the key portions. I have personally reviewed all images and agree with the resident's interpretation. I have reviewed the emergency nurses triage note. I agree with the chief complaint, past medical history, past surgical history, allergies, medications, social and family history as documented unless otherwise noted below. Documentation of the HPI, Physical Exam and Medical Decision Making performed by medical students or scribes is based on my personal performance of the HPI, PE and MDM. I personally evaluated and examined the patient in conjunction with the APC and agree with the assessment, treatment plan, and disposition of the patient as recorded by the 
every morning 12/9/23   Shade Griffith MD   dilTIAZem (CARDIZEM) 30 MG tablet Take 1 tablet by mouth 3 times daily  Patient taking differently: Take 4 tablets by mouth daily Extended release 12/8/23   Shade Griffith MD   FLUoxetine (PROZAC) 40 MG capsule Take 1 capsule by mouth daily 8/26/23   June Black MD   fondaparinux (ARIXTRA) 10 MG/0.8ML SOLN injection Inject 0.8 mLs into the skin daily 7/16/23   Karthikeyan Crum MD   Syringe/Needle, Disp, (SYRINGE 3CC/25GX1\") 25G X 1\" 3 ML MISC To be used with B12 injections monthly 2/13/21   Sandy Rogel MD   pravastatin (PRAVACHOL) 40 MG tablet TAKE 1 TABLET(40 MG) BY MOUTH DAILY  Patient taking differently: Take 1 tablet by mouth at bedtime 6/26/20   Sandy Rogel MD   glucose monitoring kit (FREESTYLE) monitoring kit Testing twice a day.  Please dispense according to patients insurance. 12/20/19   Sandy Rogel MD   Insulin Pen Needle 31G X 5 MM MISC 1 each by Does not apply route daily 12/20/19   Sandy Rogel MD   Lancets 30G MISC Testing twice a day.  Please dispense according to patients insurance. 12/20/19   Sandy Rogel MD       REVIEW OF SYSTEMS       Review of Systems   Constitutional:  Negative for chills, fatigue and fever.   Respiratory:  Negative for cough and shortness of breath.    Cardiovascular:  Negative for chest pain.   Gastrointestinal:  Negative for abdominal pain, nausea and vomiting.   Skin:  Positive for wound.   Neurological:  Negative for headaches.       PHYSICAL EXAM      INITIAL VITALS:   /71   Pulse 83   Temp 98 °F (36.7 °C)   Resp 16   Ht 1.727 m (5' 8\")   Wt 90.3 kg (199 lb)   SpO2 98%   BMI 30.26 kg/m²     Physical Exam  HENT:      Head: Normocephalic and atraumatic.      Nose: No congestion or rhinorrhea.      Mouth/Throat:      Mouth: Mucous membranes are moist.   Eyes:      Pupils: Pupils are equal, round, and reactive to light.   Pulmonary:      Effort: No respiratory distress.

## 2024-04-13 NOTE — ED TRIAGE NOTES
Mode of arrival (squad #, walk in, police, etc) : EMS        Chief complaint(s): Wound check         Arrival Note (brief scenario, treatment PTA, etc).: Pt arrived to room 2. Pt reports having hip replacement in July 2023. Pt reports having multiple (13) surgeries since due to multiple complications. Reports last surgery was in January. Pt went to wound care today and reported increased tunneling. Pt reports pain at site and vomiting x3 days. Dressing present upon arrival. Pt is AO x4, vitals assessed, care ongoing.         C= \"Have you ever felt that you should Cut down on your drinking?\"  No  A= \"Have people Annoyed you by criticizing your drinking?\"  No  G= \"Have you ever felt bad or Guilty about your drinking?\"  No  E= \"Have you ever had a drink as an Eye-opener first thing in the morning to steady your nerves or to help a hangover?\"  No      Deferred []      Reason for deferring: N/A    *If yes to two or more: probable alcohol abuse.*

## 2024-04-13 NOTE — ED NOTES
[facility] nurse reports pt takes 15mg of Yocasta q 4 (due at 1700), ativan 1mg q8 BID (last given at 1200), Dilaudid with dressing changed q 12 hours BID.

## 2024-04-14 VITALS
HEART RATE: 89 BPM | BODY MASS INDEX: 30.16 KG/M2 | HEIGHT: 68 IN | WEIGHT: 199 LBS | DIASTOLIC BLOOD PRESSURE: 71 MMHG | TEMPERATURE: 98.3 F | RESPIRATION RATE: 13 BRPM | SYSTOLIC BLOOD PRESSURE: 111 MMHG | OXYGEN SATURATION: 100 %

## 2024-04-14 PROCEDURE — 6360000002 HC RX W HCPCS: Performed by: EMERGENCY MEDICINE

## 2024-04-14 RX ADMIN — HYDROMORPHONE HYDROCHLORIDE 0.5 MG: 1 INJECTION, SOLUTION INTRAMUSCULAR; INTRAVENOUS; SUBCUTANEOUS at 00:48

## 2024-04-14 ASSESSMENT — PAIN SCALES - GENERAL: PAINLEVEL_OUTOF10: 7

## 2024-04-14 NOTE — ED NOTES
Report given to PEBBLES Viera from [facility].   Report method by phone   The following was reviewed with receiving RN:   Current vital signs:  BP 99/61   Pulse 87   Temp 98 °F (36.7 °C)   Resp 13   Ht 1.727 m (5' 8\")   Wt 90.3 kg (199 lb)   SpO2 98%   BMI 30.26 kg/m²                      Any medication or safety alerts were reviewed. Any pending diagnostics and notifications were also reviewed, as well as any safety concerns or issues, abnormal labs, abnormal imaging, and abnormal assessment findings. Questions were answered.

## 2024-04-16 ENCOUNTER — FOLLOWUP TELEPHONE ENCOUNTER (OUTPATIENT)
Dept: WOUND CARE | Age: 44
End: 2024-04-16

## 2024-04-17 ENCOUNTER — HOSPITAL ENCOUNTER (OUTPATIENT)
Dept: WOUND CARE | Age: 44
Discharge: HOME | End: 2024-04-17

## 2024-04-22 NOTE — ED PROVIDER NOTES
Brief Operative Note    Patient: Nichelle Simpson 40 year old female    MRN: [id number]    Surgeon(s): Evans Reddy MD  Phone Number: [phone]                       Surgeons and Role:     * Evans Reddy MD - Primary    Assistant(s): Danisha Nguyen    Pre-Op Diagnosis: Cervical radiculopathy [M54.12]  Neck pain [M54.2]  Cervical disc disorder [M50.90]     Post-Op Diagnosis: same     Procedure: Procedure(s):  C6-7  ANTERIOR CERVICAL DISCECTOMY FUSION    Anesthesia Type: General                                   Complications: None    Description:      Findings: as above    Specimens Removed: No specimens collected     Estimated Blood Loss: minimal    Assistant Tasks: Opening and closing/retracting/suctioning     Implants:   Implant Name Type Inv. Item Serial No.  Lot No. LRB No. Used Action   GRAFT ANT 1CC DBM DMNR BN FBR [facility]FT [facility]  - [id number] Filler GRAFT ANT 1CC DBM DMNR BN FBR ALLOGRAFT FRZDR  [id number] [employer]onic Spinal \T\ Biologics  N/A 1 Implanted   CAGE SPNL [id number] ENDOSKELETON TC 6D MED TI INTRBD FUS - [id number] Cage CAGE SPNL [id number] ENDOSKELETON TC 6D MED TI INTRBD FUS  Medtronic Spinal \T\ Biologics [id number] N/A 1 Implanted         I was present for the key portions of the procedure and was immediately available for the non-key portions         EMERGENCY DEPARTMENT ENCOUNTER    Pt Name: Courtney Alfaro  MRN: [id number]  [facility] 1980  Date of evaluation: 7/18/23  CHIEF COMPLAINT       Chief Complaint   Patient presents with    Altered Mental Status    Alcohol Intoxication     HISTORY OF PRESENT ILLNESS   41-year-old history of hypertension, diabetes, alcohol abuse with frequent falls including having a traumatic subarachnoid hemorrhage presenting with fall    Patient apparently fell at home    Was found wedged between some furniture    Brought in by EMS    Somewhat intoxicated not really able to provide much further history            REVIEW OF SYSTEMS     Review of Systems   Unable to perform ROS: Mental status change   Constitutional:  Negative for chills and fever. HENT:  Negative for rhinorrhea and sore throat. Eyes:  Negative for discharge and redness. Respiratory:  Negative for shortness of breath. Cardiovascular:  Negative for chest pain. Gastrointestinal:  Negative for abdominal pain, diarrhea, nausea and vomiting. Genitourinary:  Negative for dysuria, frequency and urgency. Musculoskeletal:  Negative for arthralgias and myalgias. Left hip pain   Skin:  Negative for rash. Neurological:  Negative for weakness and numbness. Psychiatric/Behavioral:  Negative for suicidal ideas. All other systems reviewed and are negative.   PASTMEDICAL HISTORY     Past Medical History:   Diagnosis Date    Alcohol abuse     Recurrent episodes of withdrawal    Anxiety 2013    Arthritis 2013    Painting esophagus 07/19/2021    Benzodiazepine overdose 09/26/2015    Bipolar disorder, unspecified ([address])     Bipolar I disorder, most recent episode depressed, severe without psychotic features ([address])     Cellulitis 11/17/2018    Chronic abdominal wound infection 09/27/2015    Constipation 09/03/2019    Depression 07/12/2015    Drug overdose, intentional ([address]) 07/12/2015    Genital herpes, unspecified     GERD (gastroesophageal reflux disease) 2021

## 2024-04-24 ENCOUNTER — HOSPITAL ENCOUNTER (OUTPATIENT)
Dept: WOUND CARE | Age: 44
Discharge: HOME | End: 2024-04-24

## 2024-04-24 ENCOUNTER — FOLLOWUP TELEPHONE ENCOUNTER (OUTPATIENT)
Dept: WOUND CARE | Age: 44
End: 2024-04-24

## 2024-04-24 NOTE — DISCHARGE INSTRUCTIONS
[facility]                            Visit  Discharge Instructions / Physician Orders  DATE:4/24/24     Home Care:[facility]     SUPPLIES ORDERED THRU:                     DATE LAST SUPPLIED     Wound Location: LEFT lat THIGH, LEFT HIP                                  Left inner thigh x 2                                 Right upper back  Cleanse with: Vashe all wounds     Dressing Orders:Left post hip-tuck silver carlie rope into tunnel at 10:00 it goes 6.5 cm then cover the rest of the wound with Vashe moist to moist dressing                                Left inner thigh and Right buttock- lay mayo over top and cover with silicone border dressing                                L thigh anterior- vashe moist to moist dressing cover with silicone border dressing               Frequency:  Left Hip change 2 x per day                       Left Lat Thigh- 2 x per day                       Right upper buttock, Left inner thigh x2 change daily     Additional Orders: Increase protein to diet (meat, cheese, eggs, fish, peanut butter, nuts and beans)  Multivitamin daily      OFFLOADING [x] YES  TYPE:                  [] NA  Make sure she has an offloading air mattress to bed  No more than 2 hours up in Chair  Weekly wound care visits until determined otherwise.     Antibiotic therapy-wound care related YES [x] NO [] NA[]  Per [facility]  MY CHART []     Smart Device  []      HYPERBARIC TREATMENT-                TREATMENT #                          Your next appointment with the W[facility] Care Center is in  1 week with Heidi                                                                                                                                                                                                    (Please note your next appointment above and if you are unable to keep, kindly give a 24 hour notice. Thank you.)  If more than 15 min late we cannot guarantee you will

## 2024-05-27 ENCOUNTER — TELEPHONE (OUTPATIENT)
Dept: WOUND CARE | Age: 44
End: 2024-05-27

## 2024-08-16 ENCOUNTER — APPOINTMENT (OUTPATIENT)
Dept: GENERAL RADIOLOGY | Age: 44
End: 2024-08-16
Payer: COMMERCIAL

## 2024-08-16 ENCOUNTER — APPOINTMENT (OUTPATIENT)
Dept: CT IMAGING | Age: 44
End: 2024-08-16
Payer: COMMERCIAL

## 2024-08-16 ENCOUNTER — HOSPITAL ENCOUNTER (EMERGENCY)
Age: 44
Discharge: HOME | End: 2024-08-16
Attending: EMERGENCY MEDICINE
Payer: COMMERCIAL

## 2024-08-16 VITALS
SYSTOLIC BLOOD PRESSURE: 121 MMHG | HEART RATE: 97 BPM | RESPIRATION RATE: 28 BRPM | OXYGEN SATURATION: 97 % | BODY MASS INDEX: 31.07 KG/M2 | WEIGHT: 205 LBS | DIASTOLIC BLOOD PRESSURE: 84 MMHG | HEIGHT: 68 IN | TEMPERATURE: 99.6 F

## 2024-08-16 DIAGNOSIS — G89.18 POST-OP PAIN: Primary | ICD-10-CM

## 2024-08-16 PROCEDURE — 99284 EMERGENCY DEPT VISIT MOD MDM: CPT

## 2024-08-16 PROCEDURE — 73560 X-RAY EXAM OF KNEE 1 OR 2: CPT

## 2024-08-16 PROCEDURE — 73552 X-RAY EXAM OF FEMUR 2/>: CPT

## 2024-08-16 PROCEDURE — 6370000000 HC RX 637 (ALT 250 FOR IP)

## 2024-08-16 PROCEDURE — 70450 CT HEAD/BRAIN W/O DYE: CPT

## 2024-08-16 RX ORDER — ACETAMINOPHEN 500 MG
1000 TABLET ORAL ONCE
Status: DISCONTINUED | OUTPATIENT
Start: 2024-08-16 | End: 2024-08-17 | Stop reason: HOSPADM

## 2024-08-16 RX ORDER — OXYCODONE HYDROCHLORIDE 5 MG/1
10 TABLET ORAL ONCE
Status: COMPLETED | OUTPATIENT
Start: 2024-08-16 | End: 2024-08-16

## 2024-08-16 RX ORDER — ACETAMINOPHEN 500 MG
1000 TABLET ORAL 4 TIMES DAILY PRN
Qty: 80 TABLET | Refills: 0 | Status: SHIPPED | OUTPATIENT
Start: 2024-08-16 | End: 2024-08-26

## 2024-08-16 RX ORDER — LIDOCAINE 4 G/G
1 PATCH TOPICAL DAILY
Qty: 10 EACH | Refills: 0 | Status: SHIPPED | OUTPATIENT
Start: 2024-08-16 | End: 2024-08-19 | Stop reason: ALTCHOICE

## 2024-08-16 RX ADMIN — OXYCODONE HYDROCHLORIDE 10 MG: 5 TABLET ORAL at 18:08

## 2024-08-16 ASSESSMENT — PAIN DESCRIPTION - LOCATION: LOCATION: HIP;LEG

## 2024-08-16 ASSESSMENT — PAIN SCALES - GENERAL
PAINLEVEL_OUTOF10: 10

## 2024-08-16 ASSESSMENT — PAIN - FUNCTIONAL ASSESSMENT: PAIN_FUNCTIONAL_ASSESSMENT: 0-10

## 2024-08-16 ASSESSMENT — PAIN DESCRIPTION - ORIENTATION: ORIENTATION: LEFT

## 2024-08-16 NOTE — ED PROVIDER NOTES
[facility] ED  eMERGENCY dEPARTMENT eNCOUnter   Attending Attestation     Pt Name: Krista Chiang  MRN: [id number]  Birthdate 1980  Date of evaluation: 8/16/24       Krista Chiang is a 44 y.o. female who presents with Post-op Problem and Leg Pain (Lt)      History:   Patient is here because she is has chronic pain from her hip that was replaced and then they had to remove part of it because of infections.  Patient was sent home from rehab with pain medications to get her to her appointment on Monday but for some reason her pharmacy will not fill them so she has had no pain meds.  Patient states that starting make her feel sick to her stomach.  Patient is also fallen a couple times because of the pain.    Exam: Vitals:   Vitals:    08/16/24 1737   BP: 137/80   Pulse: (!) 109   Resp: 20   Temp: 99.6 °F (37.6 °C)   SpO2: 99%   Weight: 93 kg (205 lb)   Height: 1.727 m (5' 8\")         I performed a history and physical examination of the patient and discussed management with the resident. I reviewed the resident’s note and agree with the documented findings and plan of care. Any areas of disagreement are noted on the chart. I was personally present for the key portions of any procedures. I have documented in the chart those procedures where I was not present during the key portions. I have personally reviewed all images and agree with the resident's interpretation. I have reviewed the emergency nurses triage note. I agree with the chief complaint, past medical history, past surgical history, allergies, medications, social and family history as documented unless otherwise noted below. Documentation of the HPI, Physical Exam and Medical Decision Making performed by medical students or scribes is based on my personal performance of the HPI, PE and MDM. I personally evaluated and examined the patient in conjunction with the APC and agree with the assessment, treatment plan, and disposition of the patient as  recorded by the APC. Additional findings are as noted.    Alexis Barnes MD  Attending Emergency  Physician              Alexis Barnes MD  08/16/24 8330

## 2024-08-16 NOTE — ED PROVIDER NOTES
[facility] ED  Emergency Department Encounter  Emergency Medicine Resident     Pt Name:Krista Chiang  MRN: [id number]  Birthdate 1980  Date of evaluation: 8/16/24  PCP:  Arielle Melton APRN - NP  Note Started: 7:37 PM EDT      CHIEF COMPLAINT       Chief Complaint   Patient presents with    Post-op Problem    Leg Pain     Lt       HISTORY OF PRESENT ILLNESS  (Location/Symptom, Timing/Onset, Context/Setting, Quality, Duration, Modifying Factors, Severity.)      Krista Chiang is a 44 y.o. female who presents with left hip p[ain. Patient was discharged from rehab yesterday after an long hospital stay for  left hip prosthetic joint infection and femoral osteomyelitis. She reports that she was discharged with scripts for analgesics including oxycodone however the pharmacy refused to dispense it as she reports they have to reach out to the physician for confirmation. She has not taken anything for pain today. She reports that she fell 4 times today secondary to her pain. She did not hit her head or lose consciousness when she fell.  She denies any fever, chills, sweats, nausea, vomiting, chest pain, shortness of breath.  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 encounter, Constipation, Depression, Fibromyalgia, Genital herpes, unspecified, GERD (gastroesophageal reflux disease), H/O bariatric surgery, History of pulmonary embolism, Hx of blood clots, Hypertension, Lives in nursing home, Lumbosacral spondylosis without myelopathy, MDRO (multiple drug resistant organisms) resistance, Mobility impaired, MRSA (methicillin resistant staph aureus) culture positive, Muscle  Health     Financial Resource Strain: Medium Risk (7/8/2024)    Received from [facility]    Overall Financial Resource Strain (CARDIA)     Difficulty of Paying Living Expenses: Somewhat hard   Food Insecurity: No Food Insecurity (7/8/2024)    Received from [facility]    Hunger Vital Sign     Worried About Running Out of Food in the Last Year: Never true     Ran Out of Food in the Last Year: Never true   Transportation Needs: No Transportation Needs (8/16/2024)    Received from [facility]    SM SDOH Transportation Source     Has lack of transportation kept you from medical appointments or from getting medications?: No     Has lack of transportation kept you from meetings, work, or from getting things needed for daily living?: No   Recent Concern: Transportation Needs - Unmet Transportation Needs (6/16/2024)    Received from [facility]    PRAPARE - Transportation     Lack of Transportation (Medical): Yes     Lack of Transportation (Non-Medical): Yes   Physical Activity: Insufficiently Active (4/15/2021)    [facility]    Exercise Vital Sign     Days of Exercise per Week: 2 days     Minutes of Exercise per Session: 50 min   Stress: No Stress Concern Present (8/15/2024)    Received from [facility] [place]    [employer] [place] [employer] - Occupational Stress Questionnaire     Feeling of Stress : Not at all   Social Connections: Moderately Integrated (7/8/2024)    Received from [facility]    Social Connection and Isolation Panel [NHANES]     Frequency of Communication with Friends and Family: More than three times a week     Frequency of Social Gatherings with Friends and Family: Three times a week     Attends Temple Services: More than 4 times per year     Active Member of Clubs or Organizations: Yes     Attends Club or Organization Meetings: More than 4 times per year     Marital Status:    Intimate  Movements: EOM normal.      Conjunctiva/sclera: Conjunctivae normal.      Pupils: Pupils are equal, round, and reactive to light.   Cardiovascular:      Rate and Rhythm: Normal rate and regular rhythm.      Heart sounds: Normal heart sounds. No murmur heard.     No friction rub. No gallop.   Pulmonary:      Effort: Pulmonary effort is normal. No respiratory distress.      Breath sounds: Normal breath sounds. No wheezing.   Abdominal:      General: Bowel sounds are normal. There is no distension.      Palpations: Abdomen is soft.      Tenderness: There is no abdominal tenderness.   Musculoskeletal:         General: No edema.      Comments: Wound vac in place over the lateral aspect of the left hip; serosanguinous fluid in unit   Abrasion over R knee    Left leg is shorter compared to R leg; 2/2 femoral shortening after surgery  2+ DP and PT pulses bilaterally    Sensation at baseline for patient   Full range of motion of right lower extremity.  Left lower extremity ROM limited 2/2 pain   Skin:     General: Skin is warm and dry.      Capillary Refill: Capillary refill takes less than 2 seconds.      Findings: No rash.   Neurological:      Mental Status: She is alert and oriented to person, place, and time.      GCS: GCS eye subscore is 4. GCS verbal subscore is 5. GCS motor subscore is 6.           DDX/DIAGNOSTIC RESULTS / EMERGENCY DEPARTMENT COURSE / MDM     Medical Decision Making  Given the patient has fallen multiple times today patient is on fondaparinux, will get a CT head to rule out brain bleed we will get an x-ray of right knee she has an abrasion over it.  Also an x-ray of the femur to rule out any fractures.  Treat symptomatically.    Dispo pending reassessment      Amount and/or Complexity of Data Reviewed  Radiology: ordered. Decision-making details documented in ED Course.    Risk  OTC drugs.  Prescription drug management.        EKG      All EKG's are interpreted by the Emergency Department Physician  who either signs or Co-signs this chart in the absence of a cardiologist.    EMERGENCY DEPARTMENT COURSE:    ED Course as of 08/17/24 0252   Fri Aug 16, 2024   1948 CT HEAD WO CONTRAST  IMPRESSION:  No acute intracranial abnormality.   [HS]   1948 XR KNEE RIGHT (1-2 VIEWS)  IMPRESSION:  Mild  osteoarthritis of the knee.     No acute bony abnormalities are noted   [HS]   1949 XR FEMUR LEFT (MIN 2 VIEWS)  IMPRESSION:  1. Status post left hip arthroplasty explantation.  2. Chronic nondisplaced at least partially ununited fracture of the proximal  femoral diaphysis between the 2 most distal cerclage wires.  3. Cortical irregularity of the proximal femur and left acetabulum which  could be related to the patient's known osteomyelitis.   [HS]   1951 Patient reassessed, reports that her pain has slightly improved from when she got here.  Return precautions were discussed the patient.  Patient instructed to follow-up with [facility] for her opiate prescription.  Patient instructed to follow-up with the orthopedic surgeon that did her surgery for continued management and care.  Patient verbalized understanding [HS]      ED Course User Index  [HS] Rafal Justin MD       PROCEDURES:      CONSULTS:  None    CRITICAL CARE:  There was significant risk of life threatening deterioration of patient's condition requiring my direct management. Critical care time 0 minutes, excluding any documented procedures.    FINAL IMPRESSION      1. Post-op pain          DISPOSITION / PLAN     DISPOSITION    Condition at Disposition: Data Unavailable      PATIENT REFERRED TO:  No follow-up provider specified.    DISCHARGE MEDICATIONS:  New Prescriptions    No medications on file       Rafal Justin MD  Emergency Medicine Resident    (Please note that portions of thisnote were completed with a voice recognition program.  Efforts were made to edit the dictations but occasionally words are mis-transcribed.)

## 2024-08-16 NOTE — DISCHARGE INSTRUCTIONS
You were seen in the ER today for pain in your left leg.  X-rays did not show any acute fractures.  Please follow-up with your primary care physician for continued care and management of your C. Difficile.  Please continue taking your vancomycin as prescribed previously.  Please follow-up with the orthopedic surgeon that did the surgery for you management care if you could not reach them, brought the number for our orthopedic surgery clinic.     You need to call Arielle Melton APRN - NP to make an appointment as directed for follow up.      Take any medications as prescribed, if given any, otherwise for pain Use ibuprofen or Tylenol (unless prescribed medications that have Tylenol in it).  You can take over the counter Ibuprofen (advil) tablets (4 tablets every 8 hours or 3 tablets every 6 hours or 2 tablets every 4 hours)    If given narcotics during this ED visit, please do not drive or operate heavy machinery for at least 4-6 hours.     PLEASE RETURN TO THE ED IMMEDIATELY for worsening symptoms, or if you develop any concerning symptoms such as: high fever not relieved by tylenol and/or motrin, chills, shortness of breath, chest pain, persistent nausea and/or vomiting, numbness, weakness or tingling in the arms or legs or change in color of the extremities, changes in mental status, persistent headache, blurry vision, inability to urinate, unable to follow up with your physician, or other any other  Care or concern.

## 2024-08-16 NOTE — ED NOTES
Mode of arrival (squad #, walk in, police, etc) : ems        Chief complaint(s): Post-op pain        Arrival Note (brief scenario, treatment PTA, etc).: Pt arrived via EMS. Pt had 4in. Of femur taken off in July at [facility]. Pt has a wound vac. Pt reports falling x3 times since being back home from surgery last night. Pt reporting of increasing pain. Per EMS, living conditions are not in good condition. Pt is AO x4, vitals assessed, care ongoing.

## 2024-08-17 ASSESSMENT — ENCOUNTER SYMPTOMS
COUGH: 0
BACK PAIN: 0
DIARRHEA: 0
SHORTNESS OF BREATH: 0
NAUSEA: 0
VOMITING: 0
ABDOMINAL PAIN: 0

## 2024-08-17 NOTE — ED NOTES
[facility] called and stated transportation can get her in 15 min. Writer in room to clean patient, placed new hamilton pads down. Pt declined wearing a brief.

## 2024-08-18 ENCOUNTER — HOSPITAL ENCOUNTER (INPATIENT)
Age: 44
LOS: 3 days | Discharge: NURSING, REHAB OR LONG-TERM CARE | End: 2024-08-22
Attending: STUDENT IN AN ORGANIZED HEALTH CARE EDUCATION/TRAINING PROGRAM | Admitting: INTERNAL MEDICINE
Payer: COMMERCIAL

## 2024-08-18 DIAGNOSIS — R52 INTRACTABLE PAIN: ICD-10-CM

## 2024-08-18 DIAGNOSIS — Z96.649 INFECTION OF PROSTHETIC HIP JOINT, SUBSEQUENT ENCOUNTER: ICD-10-CM

## 2024-08-18 DIAGNOSIS — R62.7 FAILURE TO THRIVE IN ADULT: Primary | ICD-10-CM

## 2024-08-18 DIAGNOSIS — T84.59XD INFECTION OF PROSTHETIC HIP JOINT, SUBSEQUENT ENCOUNTER: ICD-10-CM

## 2024-08-18 PROCEDURE — 99285 EMERGENCY DEPT VISIT HI MDM: CPT

## 2024-08-18 PROCEDURE — 83735 ASSAY OF MAGNESIUM: CPT

## 2024-08-18 PROCEDURE — 36415 COLL VENOUS BLD VENIPUNCTURE: CPT

## 2024-08-18 PROCEDURE — 80053 COMPREHEN METABOLIC PANEL: CPT

## 2024-08-18 RX ORDER — MORPHINE SULFATE 4 MG/ML
4 INJECTION, SOLUTION INTRAMUSCULAR; INTRAVENOUS ONCE
Status: COMPLETED | OUTPATIENT
Start: 2024-08-18 | End: 2024-08-19

## 2024-08-18 RX ORDER — OXYCODONE HYDROCHLORIDE 5 MG/1
10 TABLET ORAL ONCE
Status: DISCONTINUED | OUTPATIENT
Start: 2024-08-18 | End: 2024-08-18

## 2024-08-18 RX ORDER — 0.9 % SODIUM CHLORIDE 0.9 %
1000 INTRAVENOUS SOLUTION INTRAVENOUS ONCE
Status: COMPLETED | OUTPATIENT
Start: 2024-08-18 | End: 2024-08-19

## 2024-08-19 PROBLEM — R52 INTRACTABLE PAIN: Status: ACTIVE | Noted: 2024-08-19

## 2024-08-19 LAB
ALBUMIN SERPL-MCNC: 2.1 G/DL (ref 3.5–5.2)
ALP SERPL-CCNC: 193 U/L (ref 35–104)
ALT SERPL-CCNC: 22 U/L (ref 5–33)
ANION GAP SERPL CALCULATED.3IONS-SCNC: 14 MMOL/L (ref 9–17)
ANION GAP SERPL CALCULATED.3IONS-SCNC: 8 MMOL/L (ref 9–17)
AST SERPL-CCNC: 17 U/L
BASOPHILS # BLD: 0 K/UL (ref 0–0.2)
BASOPHILS # BLD: 0 K/UL (ref 0–0.2)
BASOPHILS NFR BLD: 1 % (ref 0–2)
BASOPHILS NFR BLD: 1 % (ref 0–2)
BILIRUB SERPL-MCNC: 0.3 MG/DL (ref 0.3–1.2)
BUN SERPL-MCNC: 4 MG/DL (ref 6–20)
BUN SERPL-MCNC: 6 MG/DL (ref 6–20)
CALCIUM SERPL-MCNC: 7.2 MG/DL (ref 8.6–10.4)
CALCIUM SERPL-MCNC: 7.8 MG/DL (ref 8.6–10.4)
CHLORIDE SERPL-SCNC: 106 MMOL/L (ref 98–107)
CHLORIDE SERPL-SCNC: 111 MMOL/L (ref 98–107)
CO2 SERPL-SCNC: 17 MMOL/L (ref 20–31)
CO2 SERPL-SCNC: 22 MMOL/L (ref 20–31)
CREAT SERPL-MCNC: <0.4 MG/DL (ref 0.5–0.9)
CREAT SERPL-MCNC: <0.4 MG/DL (ref 0.5–0.9)
EOSINOPHIL # BLD: 0.1 K/UL (ref 0–0.4)
EOSINOPHIL # BLD: 0.1 K/UL (ref 0–0.4)
EOSINOPHILS RELATIVE PERCENT: 2 % (ref 0–4)
EOSINOPHILS RELATIVE PERCENT: 3 % (ref 0–4)
ERYTHROCYTE [DISTWIDTH] IN BLOOD BY AUTOMATED COUNT: 17.1 % (ref 11.5–14.9)
ERYTHROCYTE [DISTWIDTH] IN BLOOD BY AUTOMATED COUNT: 17.3 % (ref 11.5–14.9)
GFR, ESTIMATED: ABNORMAL ML/MIN/1.73M2
GFR, ESTIMATED: ABNORMAL ML/MIN/1.73M2
GLUCOSE BLD-MCNC: 108 MG/DL (ref 65–105)
GLUCOSE BLD-MCNC: 275 MG/DL (ref 65–105)
GLUCOSE BLD-MCNC: 288 MG/DL (ref 65–105)
GLUCOSE BLD-MCNC: 84 MG/DL (ref 65–105)
GLUCOSE BLD-MCNC: 89 MG/DL (ref 65–105)
GLUCOSE SERPL-MCNC: 119 MG/DL (ref 70–99)
GLUCOSE SERPL-MCNC: 90 MG/DL (ref 70–99)
HCT VFR BLD AUTO: 28.4 % (ref 36–46)
HCT VFR BLD AUTO: 29.3 % (ref 36–46)
HGB BLD-MCNC: 8.7 G/DL (ref 12–16)
HGB BLD-MCNC: 9.4 G/DL (ref 12–16)
LYMPHOCYTES NFR BLD: 1.2 K/UL (ref 1–4.8)
LYMPHOCYTES NFR BLD: 1.5 K/UL (ref 1–4.8)
LYMPHOCYTES RELATIVE PERCENT: 17 % (ref 24–44)
LYMPHOCYTES RELATIVE PERCENT: 31 % (ref 24–44)
MAGNESIUM SERPL-MCNC: 1.8 MG/DL (ref 1.6–2.6)
MAGNESIUM SERPL-MCNC: 1.8 MG/DL (ref 1.6–2.6)
MCH RBC QN AUTO: 27.1 PG (ref 26–34)
MCH RBC QN AUTO: 27.3 PG (ref 26–34)
MCHC RBC AUTO-ENTMCNC: 30.7 G/DL (ref 31–37)
MCHC RBC AUTO-ENTMCNC: 32 G/DL (ref 31–37)
MCV RBC AUTO: 85.1 FL (ref 80–100)
MCV RBC AUTO: 88.4 FL (ref 80–100)
MONOCYTES NFR BLD: 0.5 K/UL (ref 0.1–1.3)
MONOCYTES NFR BLD: 0.5 K/UL (ref 0.1–1.3)
MONOCYTES NFR BLD: 11 % (ref 1–7)
MONOCYTES NFR BLD: 8 % (ref 1–7)
NEUTROPHILS NFR BLD: 54 % (ref 36–66)
NEUTROPHILS NFR BLD: 72 % (ref 36–66)
NEUTS SEG NFR BLD: 2.6 K/UL (ref 1.3–9.1)
NEUTS SEG NFR BLD: 5.1 K/UL (ref 1.3–9.1)
PLATELET # BLD AUTO: 252 K/UL (ref 150–450)
PLATELET # BLD AUTO: 327 K/UL (ref 150–450)
PMV BLD AUTO: 11.3 FL (ref 6–12)
PMV BLD AUTO: 9.7 FL (ref 6–12)
POTASSIUM SERPL-SCNC: 3.2 MMOL/L (ref 3.7–5.3)
POTASSIUM SERPL-SCNC: 3.4 MMOL/L (ref 3.7–5.3)
PROT SERPL-MCNC: 5.3 G/DL (ref 6.4–8.3)
RBC # BLD AUTO: 3.21 M/UL (ref 4–5.2)
RBC # BLD AUTO: 3.44 M/UL (ref 4–5.2)
SODIUM SERPL-SCNC: 137 MMOL/L (ref 135–144)
SODIUM SERPL-SCNC: 141 MMOL/L (ref 135–144)
WBC OTHER # BLD: 4.8 K/UL (ref 3.5–11)
WBC OTHER # BLD: 7 K/UL (ref 3.5–11)

## 2024-08-19 PROCEDURE — 2580000003 HC RX 258

## 2024-08-19 PROCEDURE — 6360000002 HC RX W HCPCS

## 2024-08-19 PROCEDURE — 97606 NEG PRS WND THER DME>50 SQCM: CPT

## 2024-08-19 PROCEDURE — 1200000000 HC SEMI PRIVATE

## 2024-08-19 PROCEDURE — 83735 ASSAY OF MAGNESIUM: CPT

## 2024-08-19 PROCEDURE — 6360000002 HC RX W HCPCS: Performed by: INTERNAL MEDICINE

## 2024-08-19 PROCEDURE — 2W2PX4Z DRESSING OF LEFT UPPER LEG USING BANDAGE: ICD-10-PCS | Performed by: INTERNAL MEDICINE

## 2024-08-19 PROCEDURE — 97162 PT EVAL MOD COMPLEX 30 MIN: CPT

## 2024-08-19 PROCEDURE — 99223 1ST HOSP IP/OBS HIGH 75: CPT | Performed by: INTERNAL MEDICINE

## 2024-08-19 PROCEDURE — 99222 1ST HOSP IP/OBS MODERATE 55: CPT | Performed by: PHYSICAL MEDICINE & REHABILITATION

## 2024-08-19 PROCEDURE — 97530 THERAPEUTIC ACTIVITIES: CPT

## 2024-08-19 PROCEDURE — 6370000000 HC RX 637 (ALT 250 FOR IP): Performed by: NURSE PRACTITIONER

## 2024-08-19 PROCEDURE — 6360000002 HC RX W HCPCS: Performed by: NURSE PRACTITIONER

## 2024-08-19 PROCEDURE — 82947 ASSAY GLUCOSE BLOOD QUANT: CPT

## 2024-08-19 PROCEDURE — 96374 THER/PROPH/DIAG INJ IV PUSH: CPT

## 2024-08-19 PROCEDURE — 85025 COMPLETE CBC W/AUTO DIFF WBC: CPT

## 2024-08-19 PROCEDURE — 97166 OT EVAL MOD COMPLEX 45 MIN: CPT

## 2024-08-19 PROCEDURE — 80048 BASIC METABOLIC PNL TOTAL CA: CPT

## 2024-08-19 PROCEDURE — 36415 COLL VENOUS BLD VENIPUNCTURE: CPT

## 2024-08-19 PROCEDURE — 2580000003 HC RX 258: Performed by: NURSE PRACTITIONER

## 2024-08-19 RX ORDER — POLYETHYLENE GLYCOL 3350 17 G/17G
17 POWDER, FOR SOLUTION ORAL DAILY PRN
Status: DISCONTINUED | OUTPATIENT
Start: 2024-08-19 | End: 2024-08-22 | Stop reason: HOSPADM

## 2024-08-19 RX ORDER — MORPHINE SULFATE 4 MG/ML
4 INJECTION, SOLUTION INTRAMUSCULAR; INTRAVENOUS ONCE
Status: COMPLETED | OUTPATIENT
Start: 2024-08-19 | End: 2024-08-19

## 2024-08-19 RX ORDER — LANOLIN ALCOHOL/MO/W.PET/CERES
6 CREAM (GRAM) TOPICAL NIGHTLY PRN
Status: DISCONTINUED | OUTPATIENT
Start: 2024-08-19 | End: 2024-08-22 | Stop reason: HOSPADM

## 2024-08-19 RX ORDER — MORPHINE SULFATE 2 MG/ML
2 INJECTION, SOLUTION INTRAMUSCULAR; INTRAVENOUS EVERY 4 HOURS PRN
Status: DISCONTINUED | OUTPATIENT
Start: 2024-08-19 | End: 2024-08-22 | Stop reason: HOSPADM

## 2024-08-19 RX ORDER — INSULIN LISPRO 100 [IU]/ML
0-4 INJECTION, SOLUTION INTRAVENOUS; SUBCUTANEOUS NIGHTLY
Status: DISCONTINUED | OUTPATIENT
Start: 2024-08-19 | End: 2024-08-22 | Stop reason: HOSPADM

## 2024-08-19 RX ORDER — DILTIAZEM HYDROCHLORIDE 120 MG/1
120 CAPSULE, COATED, EXTENDED RELEASE ORAL DAILY
Status: DISCONTINUED | OUTPATIENT
Start: 2024-08-19 | End: 2024-08-22 | Stop reason: HOSPADM

## 2024-08-19 RX ORDER — PANTOPRAZOLE SODIUM 40 MG/1
40 TABLET, DELAYED RELEASE ORAL
Status: DISCONTINUED | OUTPATIENT
Start: 2024-08-19 | End: 2024-08-22 | Stop reason: HOSPADM

## 2024-08-19 RX ORDER — GABAPENTIN 300 MG/1
300 CAPSULE ORAL 3 TIMES DAILY
Status: DISCONTINUED | OUTPATIENT
Start: 2024-08-19 | End: 2024-08-22 | Stop reason: HOSPADM

## 2024-08-19 RX ORDER — INSULIN LISPRO 100 [IU]/ML
0-8 INJECTION, SOLUTION INTRAVENOUS; SUBCUTANEOUS
Status: DISCONTINUED | OUTPATIENT
Start: 2024-08-19 | End: 2024-08-22 | Stop reason: HOSPADM

## 2024-08-19 RX ORDER — SODIUM CHLORIDE 0.9 % (FLUSH) 0.9 %
10 SYRINGE (ML) INJECTION PRN
Status: DISCONTINUED | OUTPATIENT
Start: 2024-08-19 | End: 2024-08-22 | Stop reason: HOSPADM

## 2024-08-19 RX ORDER — MAGNESIUM SULFATE HEPTAHYDRATE 40 MG/ML
2000 INJECTION, SOLUTION INTRAVENOUS PRN
Status: DISCONTINUED | OUTPATIENT
Start: 2024-08-19 | End: 2024-08-22 | Stop reason: HOSPADM

## 2024-08-19 RX ORDER — LORAZEPAM 0.5 MG/1
0.5 TABLET ORAL EVERY 8 HOURS PRN
Status: DISCONTINUED | OUTPATIENT
Start: 2024-08-19 | End: 2024-08-22 | Stop reason: HOSPADM

## 2024-08-19 RX ORDER — SODIUM CHLORIDE 0.9 % (FLUSH) 0.9 %
5-40 SYRINGE (ML) INJECTION EVERY 12 HOURS SCHEDULED
Status: DISCONTINUED | OUTPATIENT
Start: 2024-08-19 | End: 2024-08-22 | Stop reason: HOSPADM

## 2024-08-19 RX ORDER — FONDAPARINUX SODIUM 10 MG/.8ML
10 INJECTION SUBCUTANEOUS DAILY
Status: DISCONTINUED | OUTPATIENT
Start: 2024-08-19 | End: 2024-08-19

## 2024-08-19 RX ORDER — OXYCODONE HYDROCHLORIDE 10 MG/1
10 TABLET ORAL EVERY 4 HOURS PRN
Status: DISCONTINUED | OUTPATIENT
Start: 2024-08-19 | End: 2024-08-22 | Stop reason: HOSPADM

## 2024-08-19 RX ORDER — PRAVASTATIN SODIUM 40 MG
40 TABLET ORAL NIGHTLY
Status: DISCONTINUED | OUTPATIENT
Start: 2024-08-19 | End: 2024-08-22 | Stop reason: HOSPADM

## 2024-08-19 RX ORDER — VANCOMYCIN HYDROCHLORIDE 125 MG/1
125 CAPSULE ORAL 4 TIMES DAILY
COMMUNITY
Start: 2024-08-13 | End: 2024-08-22

## 2024-08-19 RX ORDER — GABAPENTIN 300 MG/1
300 CAPSULE ORAL 3 TIMES DAILY
COMMUNITY

## 2024-08-19 RX ORDER — BUPROPION HYDROCHLORIDE 150 MG/1
150 TABLET ORAL EVERY MORNING
Status: DISCONTINUED | OUTPATIENT
Start: 2024-08-19 | End: 2024-08-22 | Stop reason: HOSPADM

## 2024-08-19 RX ORDER — DILTIAZEM HYDROCHLORIDE 120 MG/1
120 CAPSULE, COATED, EXTENDED RELEASE ORAL DAILY
COMMUNITY

## 2024-08-19 RX ORDER — CHOLESTYRAMINE LIGHT 4 G/5.7G
1 POWDER, FOR SUSPENSION ORAL DAILY
Status: DISCONTINUED | OUTPATIENT
Start: 2024-08-19 | End: 2024-08-22 | Stop reason: HOSPADM

## 2024-08-19 RX ORDER — PROMETHAZINE HYDROCHLORIDE 25 MG/1
25 TABLET ORAL EVERY 8 HOURS PRN
Status: DISCONTINUED | OUTPATIENT
Start: 2024-08-19 | End: 2024-08-22 | Stop reason: HOSPADM

## 2024-08-19 RX ORDER — LANOLIN ALCOHOL/MO/W.PET/CERES
3 CREAM (GRAM) TOPICAL NIGHTLY PRN
Status: DISCONTINUED | OUTPATIENT
Start: 2024-08-19 | End: 2024-08-19 | Stop reason: SDUPTHER

## 2024-08-19 RX ORDER — POTASSIUM CHLORIDE 1500 MG/1
40 TABLET, EXTENDED RELEASE ORAL PRN
Status: DISCONTINUED | OUTPATIENT
Start: 2024-08-19 | End: 2024-08-22 | Stop reason: HOSPADM

## 2024-08-19 RX ORDER — ENOXAPARIN SODIUM 100 MG/ML
40 INJECTION SUBCUTANEOUS DAILY
Status: DISCONTINUED | OUTPATIENT
Start: 2024-08-19 | End: 2024-08-19

## 2024-08-19 RX ORDER — POTASSIUM CHLORIDE 7.45 MG/ML
10 INJECTION INTRAVENOUS PRN
Status: DISCONTINUED | OUTPATIENT
Start: 2024-08-19 | End: 2024-08-22 | Stop reason: HOSPADM

## 2024-08-19 RX ORDER — ACETAMINOPHEN 650 MG/1
650 SUPPOSITORY RECTAL EVERY 6 HOURS PRN
Status: DISCONTINUED | OUTPATIENT
Start: 2024-08-19 | End: 2024-08-22 | Stop reason: HOSPADM

## 2024-08-19 RX ORDER — ONDANSETRON 4 MG/1
4 TABLET, ORALLY DISINTEGRATING ORAL EVERY 8 HOURS PRN
Status: DISCONTINUED | OUTPATIENT
Start: 2024-08-19 | End: 2024-08-22 | Stop reason: HOSPADM

## 2024-08-19 RX ORDER — BISACODYL 10 MG
10 SUPPOSITORY, RECTAL RECTAL DAILY PRN
Status: DISCONTINUED | OUTPATIENT
Start: 2024-08-19 | End: 2024-08-22 | Stop reason: HOSPADM

## 2024-08-19 RX ORDER — SODIUM CHLORIDE 9 MG/ML
INJECTION, SOLUTION INTRAVENOUS PRN
Status: DISCONTINUED | OUTPATIENT
Start: 2024-08-19 | End: 2024-08-22 | Stop reason: HOSPADM

## 2024-08-19 RX ORDER — ONDANSETRON 2 MG/ML
4 INJECTION INTRAMUSCULAR; INTRAVENOUS EVERY 6 HOURS PRN
Status: DISCONTINUED | OUTPATIENT
Start: 2024-08-19 | End: 2024-08-22 | Stop reason: HOSPADM

## 2024-08-19 RX ORDER — DICYCLOMINE HYDROCHLORIDE 10 MG/1
10 CAPSULE ORAL
Status: DISCONTINUED | OUTPATIENT
Start: 2024-08-19 | End: 2024-08-22 | Stop reason: HOSPADM

## 2024-08-19 RX ORDER — DEXTROSE MONOHYDRATE 100 MG/ML
INJECTION, SOLUTION INTRAVENOUS CONTINUOUS PRN
Status: DISCONTINUED | OUTPATIENT
Start: 2024-08-19 | End: 2024-08-22 | Stop reason: HOSPADM

## 2024-08-19 RX ORDER — ACETAMINOPHEN 325 MG/1
650 TABLET ORAL EVERY 6 HOURS PRN
Status: DISCONTINUED | OUTPATIENT
Start: 2024-08-19 | End: 2024-08-22 | Stop reason: HOSPADM

## 2024-08-19 RX ORDER — MORPHINE SULFATE 2 MG/ML
2 INJECTION, SOLUTION INTRAMUSCULAR; INTRAVENOUS ONCE
Status: COMPLETED | OUTPATIENT
Start: 2024-08-19 | End: 2024-08-19

## 2024-08-19 RX ORDER — VANCOMYCIN HYDROCHLORIDE 125 MG/1
125 CAPSULE ORAL EVERY 6 HOURS SCHEDULED
Status: DISCONTINUED | OUTPATIENT
Start: 2024-08-19 | End: 2024-08-22 | Stop reason: HOSPADM

## 2024-08-19 RX ORDER — FONDAPARINUX SODIUM 10 MG/.8ML
7.5 INJECTION SUBCUTANEOUS DAILY
Status: DISCONTINUED | OUTPATIENT
Start: 2024-08-20 | End: 2024-08-22 | Stop reason: HOSPADM

## 2024-08-19 RX ADMIN — BUPROPION HYDROCHLORIDE 150 MG: 150 TABLET, EXTENDED RELEASE ORAL at 08:52

## 2024-08-19 RX ADMIN — CHOLESTYRAMINE 4 G: 4 POWDER, FOR SUSPENSION ORAL at 08:52

## 2024-08-19 RX ADMIN — SODIUM CHLORIDE, PRESERVATIVE FREE 10 ML: 5 INJECTION INTRAVENOUS at 22:50

## 2024-08-19 RX ADMIN — MORPHINE SULFATE 2 MG: 2 INJECTION, SOLUTION INTRAMUSCULAR; INTRAVENOUS at 17:47

## 2024-08-19 RX ADMIN — MORPHINE SULFATE 2 MG: 2 INJECTION, SOLUTION INTRAMUSCULAR; INTRAVENOUS at 12:43

## 2024-08-19 RX ADMIN — SODIUM CHLORIDE, PRESERVATIVE FREE 10 ML: 5 INJECTION INTRAVENOUS at 21:02

## 2024-08-19 RX ADMIN — MORPHINE SULFATE 4 MG: 4 INJECTION, SOLUTION INTRAMUSCULAR; INTRAVENOUS at 00:13

## 2024-08-19 RX ADMIN — GABAPENTIN 300 MG: 300 CAPSULE ORAL at 21:02

## 2024-08-19 RX ADMIN — MORPHINE SULFATE 4 MG: 4 INJECTION, SOLUTION INTRAMUSCULAR; INTRAVENOUS at 02:15

## 2024-08-19 RX ADMIN — PANTOPRAZOLE SODIUM 40 MG: 40 TABLET, DELAYED RELEASE ORAL at 06:15

## 2024-08-19 RX ADMIN — FONDAPARINUX SODIUM 10 MG: 10 INJECTION, SOLUTION SUBCUTANEOUS at 09:04

## 2024-08-19 RX ADMIN — VANCOMYCIN HYDROCHLORIDE 125 MG: 125 CAPSULE ORAL at 11:05

## 2024-08-19 RX ADMIN — GABAPENTIN 300 MG: 300 CAPSULE ORAL at 12:43

## 2024-08-19 RX ADMIN — OXYCODONE HYDROCHLORIDE 10 MG: 10 TABLET ORAL at 03:37

## 2024-08-19 RX ADMIN — OXYCODONE HYDROCHLORIDE 10 MG: 10 TABLET ORAL at 09:04

## 2024-08-19 RX ADMIN — SODIUM CHLORIDE, PRESERVATIVE FREE 10 ML: 5 INJECTION INTRAVENOUS at 09:04

## 2024-08-19 RX ADMIN — GABAPENTIN 300 MG: 300 CAPSULE ORAL at 08:52

## 2024-08-19 RX ADMIN — VANCOMYCIN HYDROCHLORIDE 125 MG: 125 CAPSULE ORAL at 06:15

## 2024-08-19 RX ADMIN — DICYCLOMINE HYDROCHLORIDE 10 MG: 10 CAPSULE ORAL at 06:15

## 2024-08-19 RX ADMIN — FLUOXETINE HYDROCHLORIDE 40 MG: 20 CAPSULE ORAL at 08:52

## 2024-08-19 RX ADMIN — OXYCODONE HYDROCHLORIDE 10 MG: 10 TABLET ORAL at 14:38

## 2024-08-19 RX ADMIN — MORPHINE SULFATE 2 MG: 2 INJECTION, SOLUTION INTRAMUSCULAR; INTRAVENOUS at 22:49

## 2024-08-19 RX ADMIN — OXYCODONE HYDROCHLORIDE 10 MG: 10 TABLET ORAL at 19:40

## 2024-08-19 RX ADMIN — SODIUM CHLORIDE 1000 ML: 9 INJECTION, SOLUTION INTRAVENOUS at 00:04

## 2024-08-19 RX ADMIN — INSULIN LISPRO 4 UNITS: 100 INJECTION, SOLUTION INTRAVENOUS; SUBCUTANEOUS at 11:07

## 2024-08-19 RX ADMIN — DICYCLOMINE HYDROCHLORIDE 10 MG: 10 CAPSULE ORAL at 17:47

## 2024-08-19 RX ADMIN — POTASSIUM CHLORIDE 40 MEQ: 1500 TABLET, EXTENDED RELEASE ORAL at 08:52

## 2024-08-19 RX ADMIN — PRAVASTATIN SODIUM 40 MG: 40 TABLET ORAL at 21:02

## 2024-08-19 RX ADMIN — MORPHINE SULFATE 2 MG: 2 INJECTION, SOLUTION INTRAMUSCULAR; INTRAVENOUS at 11:06

## 2024-08-19 RX ADMIN — VANCOMYCIN HYDROCHLORIDE 125 MG: 125 CAPSULE ORAL at 17:47

## 2024-08-19 RX ADMIN — DICYCLOMINE HYDROCHLORIDE 10 MG: 10 CAPSULE ORAL at 21:02

## 2024-08-19 RX ADMIN — LORAZEPAM 0.5 MG: 0.5 TABLET ORAL at 21:02

## 2024-08-19 RX ADMIN — DICYCLOMINE HYDROCHLORIDE 10 MG: 10 CAPSULE ORAL at 11:05

## 2024-08-19 ASSESSMENT — PAIN - FUNCTIONAL ASSESSMENT
PAIN_FUNCTIONAL_ASSESSMENT: ACTIVITIES ARE NOT PREVENTED
PAIN_FUNCTIONAL_ASSESSMENT: PREVENTS OR INTERFERES SOME ACTIVE ACTIVITIES AND ADLS
PAIN_FUNCTIONAL_ASSESSMENT: ACTIVITIES ARE NOT PREVENTED
PAIN_FUNCTIONAL_ASSESSMENT: PREVENTS OR INTERFERES WITH MANY ACTIVE NOT PASSIVE ACTIVITIES
PAIN_FUNCTIONAL_ASSESSMENT: PREVENTS OR INTERFERES SOME ACTIVE ACTIVITIES AND ADLS
PAIN_FUNCTIONAL_ASSESSMENT: PREVENTS OR INTERFERES WITH MANY ACTIVE NOT PASSIVE ACTIVITIES
PAIN_FUNCTIONAL_ASSESSMENT: PREVENTS OR INTERFERES WITH MANY ACTIVE NOT PASSIVE ACTIVITIES

## 2024-08-19 ASSESSMENT — ENCOUNTER SYMPTOMS
NAUSEA: 0
BACK PAIN: 0
VOMITING: 0
COUGH: 0
DIARRHEA: 0
SHORTNESS OF BREATH: 0
ABDOMINAL PAIN: 0

## 2024-08-19 ASSESSMENT — PAIN DESCRIPTION - DESCRIPTORS
DESCRIPTORS: HEAVINESS
DESCRIPTORS: SORE;SHOOTING
DESCRIPTORS: BURNING;SHARP
DESCRIPTORS: SHARP;PRESSURE
DESCRIPTORS: SORE;SHARP
DESCRIPTORS: BURNING;SHARP
DESCRIPTORS: JABBING;SHARP
DESCRIPTORS: POUNDING;PRESSURE
DESCRIPTORS: BURNING;SHARP

## 2024-08-19 ASSESSMENT — PAIN DESCRIPTION - PAIN TYPE
TYPE: ACUTE PAIN;SURGICAL PAIN
TYPE: ACUTE PAIN;SURGICAL PAIN

## 2024-08-19 ASSESSMENT — PAIN DESCRIPTION - ORIENTATION
ORIENTATION: MID
ORIENTATION: LEFT

## 2024-08-19 ASSESSMENT — PAIN SCALES - WONG BAKER
WONGBAKER_NUMERICALRESPONSE: HURTS A LITTLE BIT
WONGBAKER_NUMERICALRESPONSE: NO HURT
WONGBAKER_NUMERICALRESPONSE: HURTS A LITTLE BIT

## 2024-08-19 ASSESSMENT — PAIN DESCRIPTION - LOCATION
LOCATION: HIP
LOCATION: ABDOMEN;HIP
LOCATION: BACK;HIP
LOCATION: HIP;LEG
LOCATION: HIP

## 2024-08-19 ASSESSMENT — PAIN DESCRIPTION - FREQUENCY
FREQUENCY: CONTINUOUS
FREQUENCY: CONTINUOUS

## 2024-08-19 ASSESSMENT — PAIN SCALES - GENERAL
PAINLEVEL_OUTOF10: 10
PAINLEVEL_OUTOF10: 8
PAINLEVEL_OUTOF10: 8
PAINLEVEL_OUTOF10: 2
PAINLEVEL_OUTOF10: 8
PAINLEVEL_OUTOF10: 3
PAINLEVEL_OUTOF10: 5
PAINLEVEL_OUTOF10: 10
PAINLEVEL_OUTOF10: 5
PAINLEVEL_OUTOF10: 4
PAINLEVEL_OUTOF10: 9
PAINLEVEL_OUTOF10: 8
PAINLEVEL_OUTOF10: 7
PAINLEVEL_OUTOF10: 3
PAINLEVEL_OUTOF10: 5
PAINLEVEL_OUTOF10: 4
PAINLEVEL_OUTOF10: 9
PAINLEVEL_OUTOF10: 10

## 2024-08-19 ASSESSMENT — PAIN DESCRIPTION - ONSET
ONSET: ON-GOING
ONSET: ON-GOING

## 2024-08-19 NOTE — CONSULTS
Physical Medicine & Rehabilitation  Consult Note      Admitting Physician: Ezio Conway MD    Primary Care Provider: Arielle Melton APRN - NP     Reason for Consult:  Acute Inpatient Rehabilitation    Chief Complaint: Pain    History of Present Illness:  Referring Provider is requesting an evaluation for appropriate placement upon discharge from acute care.     Ms. Krista Chiang is a 44 y.o. female who was admitted to [facility] on 8/18/2024 with Wound Check    44-year-old female presented for wound check admitted for hospital due to intractable pain.  She has history of antiphospholipid syndrome, bipolar disorder, chronic osteomyelitis of left femur, diabetes, hypertension, frequent falls history of drug abuse complicated by open hip wound history of MRSA infection history of PE and DVT and hypercoagulable state.  She was discharged from [facility] rehab 8/15 following 5 months of treatment for left hip prosthesis infection and femoral osteomyelitis.  Shortly after returning home she was up with her walker and tripped on a rug and fell.  She reports she had several more falls that day and came to the ED.  At that time she was able to get her pain medications as they needed confirmation..  On arrival she notes oxycodone was filled however she was unable locate the medication as severe pain she notes while in rehab her  removed a friend into their home.  She is unsure if someone took her medications.  Home nursing encouraged her to come to the ED for concerns of wound infection arrival patient was noted to have consist of granulation tissue does not appear infected.    Internal medicine recurrent falls, pain management on oxycodone and right knee and left femur x-rays from 8/16, on Arixtra for history of DVT/PE, recent C. difficile complete p.o. Vanco    Radiology:  CT HEAD WO CONTRAST    Result Date: 8/16/2024  No acute intracranial abnormality.     XR KNEE RIGHT (1-2 VIEWS)    Result Date: 8/16/2024  Mild   Oral, 4x Daily AC & HS  dilTIAZem (CARDIZEM CD) extended release capsule 120 mg, 120 mg, Oral, Daily  FLUoxetine (PROZAC) capsule 40 mg, 40 mg, Oral, Daily  fondaparinux (ARIXTRA) injection 10 mg, 10 mg, SubCUTAneous, Daily  promethazine (PHENERGAN) tablet 25 mg, 25 mg, Oral, Q8H PRN  pravastatin (PRAVACHOL) tablet 40 mg, 40 mg, Oral, Nightly  oxyCODONE HCl (OXY-IR) immediate release tablet 10 mg, 10 mg, Oral, Q4H PRN  pantoprazole (PROTONIX) tablet 40 mg, 40 mg, Oral, QAM AC  melatonin tablet 6 mg, 6 mg, Oral, Nightly PRN  sodium chloride flush 0.9 % injection 5-40 mL, 5-40 mL, IntraVENous, 2 times per day  sodium chloride flush 0.9 % injection 10 mL, 10 mL, IntraVENous, PRN  0.9 % sodium chloride infusion, , IntraVENous, PRN  potassium chloride (KLOR-CON M) extended release tablet 40 mEq, 40 mEq, Oral, PRN **OR** potassium bicarb-citric acid (EFFER-K) effervescent tablet 40 mEq, 40 mEq, Oral, PRN **OR** potassium chloride 10 mEq/100 mL IVPB (Peripheral Line), 10 mEq, IntraVENous, PRN  magnesium sulfate 2000 mg in water 50 mL IVPB, 2,000 mg, IntraVENous, PRN  ondansetron (ZOFRAN-ODT) disintegrating tablet 4 mg, 4 mg, Oral, Q8H PRN **OR** ondansetron (ZOFRAN) injection 4 mg, 4 mg, IntraVENous, Q6H PRN  polyethylene glycol (GLYCOLAX) packet 17 g, 17 g, Oral, Daily PRN  bisacodyl (DULCOLAX) suppository 10 mg, 10 mg, Rectal, Daily PRN  acetaminophen (TYLENOL) tablet 650 mg, 650 mg, Oral, Q6H PRN **OR** acetaminophen (TYLENOL) suppository 650 mg, 650 mg, Rectal, Q6H PRN  gabapentin (NEURONTIN) capsule 300 mg, 300 mg, Oral, TID  LORazepam (ATIVAN) tablet 0.5 mg, 0.5 mg, Oral, Q8H PRN  vancomycin (VANCOCIN) capsule 125 mg, 125 mg, Oral, 4 times per day    Social History:  Social History     Socioeconomic History    Marital status:      Spouse name: Jeremías Chiang    Number of children: 1    Years of education: 3 years of college    Highest education level: Not on file   Occupational History    Occupation: infant care  unable to get her pain medication-unable to locate  Fibromyalgia-Neurontin  Pain-oxycodone  Insomnia-melatonin    Recommendations:  1. Diagnosis: Chronic left hip prosthesis/osteomyelitis  2. Therapy: Pending  3. Medical  Necessity:  4. Support: Clarify, noted recently discharged from [facility] rehab 8/15 following 5 months of treatment left hip prosthesis, was in ARU for 3 weeks, states on return home mobile home in disarray falls due to clutter, unable return  5. Rehab recommendation: Patient has just completed inpatient rehab at [facility] 3 weeks, continues with falls -SNF as has already completed acute rehab and questional disposition    -Needs wound care follow-up, patient notes needs wound VAC      6. DVT proph: Arixtra    It was my pleasure to evaluate Krista Chiang today.  Please call with questions.    Eliel Guillen MD          This note is created with the assistance of a speech recognition program.  While intending to generate a document that actually reflects the content of the visit, the document can still have some errors including those of syntax and sound a like substitutions which may escape proof reading.  In such instances, actual meaning can be extrapolated by contextual diversion

## 2024-08-19 NOTE — CONSULTS
[facility]  Consult Note       NAME:  Krista Chiang  MEDICAL RECORD NUMBER:  [id number]  AGE: 44 y.o.   GENDER: female  : 1980  TODAY'S DATE:  2024    Subjective:      Krista Chiang is a 44 y.o. female with inpatient referral to [facility] for:  Wounds to left hip and left anterior thigh      Wound Identification:  Wound Type: non-healing surgical  Contributing Factors: diabetes and decreased tissue oxygenation    Wound History: Patient has history of left hip prosthetic joint infection. She has had multiple debridements and procedures both here locally and at [facility]. She was just recently discharged from [facility] rehab.   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 2015    Fibromyalgia     Genital herpes, unspecified     GERD (gastroesophageal reflux disease)     H/O bariatric surgery 2013    Awa and Y    History of pulmonary embolism     Hx of blood clots     clots in both legs and lungs     Hypertension     Lives in nursing home 2023    [facility] :  # [phone] , fax # [phone]    Lumbosacral spondylosis without myelopathy     MDRO (multiple drug resistant organisms) resistance 2010    MRSA (abd)    Mobility impaired     wheelchair, uses a walker and pivots on right foot    MRSA (methicillin resistant staph aureus) culture positive 02/10/2017    urine    Muscle weakness   osteomyelitis, multiple debridements, procedures, and flaps. She was just recently discharged from [facility] rehab. Upon assessment, patient has a large surgical scar to her left anterior thigh. There are a few open areas along the incision line that are clean and red. The ends of some of the sutures are sticking out in some areas and these have previously been trimmed to the level of the skin. Patient does not know if these were absorbable sutures or not. She has been using collagen to these areas. The left hip wound is clean and red. No devitalized tissue present. There is a very deep area in the center of the wound, but it does not probe to bone. Patient has several sutures sticking out around the perimeter of the wound that were previously trimmed to the level of the skin as well. These will likely need to work their way out or be removed by the surgeon. Skin prep and drape applied to periwound. Strip paste placed in deeper creases along the wound edge. Strips of white foam cut to fit in deeper areas of wound in the center and at the 2 o'clock position and wound filled with black foam. Covered with drape and suction applied and maintained at -150mmhg continuous suction. Patient did have pain with dressing change, but tolerated well overall. She was given an extra dose of pain medication by primary RN prior to starting the dressing change. Per notes, [facility] was giving her a dose of dilaudid prior to dressing changes. Will plan to premedicate patient again prior to next dressing change on Wednesday.     Response to treatment:  Well tolerated by patient., With complaints of pain.       Plan:     Plan of Care:     Left anterior thigh: Cleanse with saline or soap and water, pat dry. Apply collagen to wound, cover with foam dressing. Change daily and as needed if loose or soiled.     Left hip: Cleanse with saline or soap and water, pat dry. Skin prep and drape to periwound. Apply strip paste to deep creases at edge of wound.

## 2024-08-19 NOTE — PROGRESS NOTES
abilities once wound vac is placed.  Current Treatment Recommendations: Strengthening, Balance training, Functional mobility training, Transfer training, ADL/Self-care training, Gait training, Wheelchair mobility training, Endurance training, Neuromuscular re-education, Pain management, Safety education & training, Patient/Caregiver education & training, Equipment evaluation, education, & procurement, Modalities, Therapeutic activities  Safety Devices  Type of Devices: Patient at risk for falls, Left in bed, Call light within reach, Bed alarm in place     Restrictions  Restrictions/Precautions  Restrictions/Precautions: Contact Precautions, Fall Risk, Up as Tolerated  Required Braces or Orthoses?: No (denies)  Implants present? :  (IVC filter)  Position Activity Restriction  Other position/activity restrictions: As tolerated     Subjective   Pain: Pt reports L hip/knee pain at 8/10.  General  Chart Reviewed: Yes  Patient assessed for rehabilitation services?: Yes  Additional Pertinent Hx: Per [facility] DISCHARGE SUMMARY, 44 y.o. female with a history of obesity s/p bariatric surgery, DM2, antiphospholipid syndrome, DVT, left JODEE 7/2023 complicated by prosthetic joint infection (MSSA, prevotella) s/p girdlestone/flap reconstruction c/b recurrent wound dehiscence requiring multiple debridements from CRE/VRE infections who was initially admitted to [facility] 4/14/24 for MDR organism infection s/p multiple debridement/wound vac. She was then transferred to [facility] on 7/6/24 for prolonged IV antibiotics and wound care. The patient was stabilized and sent to [facility] on 7/27/24 for acute rehab. Plan for discharge home with [facility] 8/15/24 in stable condition  Response To Previous Treatment: Not applicable  Family / Caregiver Present: No  Referring Practitioner: Laquita Duron, APRN - CNP  Referral Date : 08/19/24  Diagnosis: Failure to thrive in adult, managment of L hip  Assistance: Needs assistance (w/RW and Ax1; or used w/c for mobility)  Transfer Assistance: Independent (No device)  Active : Yes  Mode of Transportation: SUV  Leisure & Hobbies: Going outside  IADL Comments: Pt sleeps in hospital bed  Additional Comments: Pt very emotional when talking about her home situation.  Pt describes a hoarding situation and is very frustrated with her spouse for not getting it cleaned up prior to her discharge home. Pt wants to go home since she has not been for \"17 months\" but verbalizes understanding that it is not safe for her in its current state.  Vision/Hearing  Vision  Vision: Impaired  Vision Exceptions:  (contact lenses)  Hearing  Hearing: Within functional limits    Cognition   Orientation  Overall Orientation Status: Within Functional Limits  Orientation Level: Oriented X4  Cognition  Overall Cognitive Status: WFL     Objective   O2 Device: None (Room air)       Observation/Palpation  Posture: Fair  Gross Assessment  Sensation: Impaired (L LE feelings of numbess and tingling)     AROM RLE (degrees)  RLE AROM: WFL  AROM LLE (degrees)  LLE AROM : Exceptions  LLE General AROM: WFL for ankle, NT knee amd hip due to high pain tolerance 8/10 and open wound, status post hardware removal  AROM RUE (degrees)  RUE General AROM: See OT Assessment  AROM LUE (degrees)  LUE General AROM: See OT Assessment  Strength RLE  Strength RLE: Exception  Comment: Grossly 2/5 for hip, knee, and ankle, did not apply resistance due to high pain levels  Strength LLE  Strength LLE: Exception  Comment: Ankle 2/5, did not apply resistance due to high pain levels and wound concerns, NT knee and hip due to wound concerns and status post hardware removal  Strength RUE  Comment: See OT Assessment  Strength LUE  Comment: See OT Assessment           Bed mobility  Rolling to Left: Modified independent  Rolling to Right: Modified independent  Bed Mobility Comments: Pt able to roll with increased time and use of  needed      Therapy Time   Individual Concurrent Group Co-treatment   Time In 0955         Time Out 1036         Minutes 41         Timed Code Treatment Minutes: 23 Minutes     The above documentation completed by Student Physical Therapist was provided under the direct line of sight by supervision. Documentation was reviewed and accepted by supervising Clinical Instructor Brandy Hendrix PT.     Samantha Valle, BAO   The above documentation completed by Student Physical Therapist Samantha Valle  was reviewed and accepted by the supervising Clinical instructor Brandy Hendrix PT.

## 2024-08-19 NOTE — ED PROVIDER NOTES
[facility] ED  Emergency Department Encounter  Emergency Medicine Resident     Pt Name:Krista Chiang  MRN: [id number]  Birthdate 1980  Date of evaluation: 8/18/24  PCP:  Arielle Melton APRN - NP  Note Started: 10:17 PM EDT      CHIEF COMPLAINT       Chief Complaint   Patient presents with    Wound Check       HISTORY OF PRESENT ILLNESS  (Location/Symptom, Timing/Onset, Context/Setting, Quality, Duration, Modifying Factors, Severity.)      Krista Chiang is a 44 y.o. female presenting to ED via mass for    CC's: Wound check    Patient with extensive past medical history notable for left periprosthetic hip fracture which had a complicated course with secondary infection requiring the left hip debridement at [facility] in 3 months of IV antibiotics for osteomyelitis.  Patient was recently discharged to home with continued pain management and follow-up as well as wound care which is not been going to plan.  Patient presented to the ED on Thursday due to falls due to pain as the patient has been unable to obtain pain medication.  Patient has home wound nursing, and evaluating changing clean wound.  Home nursing came to evaluate wound today and was concerned for infection and did not change dressing and instead contacted EMS.    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      PHYSICAL EXAM      INITIAL VITALS:   /66   Pulse (!) 103   Temp 98.4 °F (36.9 °C)   Resp 16   Ht 1.727 m (5' 8\")   Wt 93 kg (205 lb)   SpO2 99%   BMI 31.17 kg/m²     Physical Exam  Vitals and nursing note reviewed.   Constitutional:       General: She is not in acute distress.     Appearance: She is well-developed. She is not diaphoretic.   HENT:      Head: Normocephalic and atraumatic.      Nose: Nose normal.   Eyes:      Pupils: Pupils are equal, round, and reactive to light.   Cardiovascular:      Rate and Rhythm: Normal rate and regular rhythm.      Pulses:           Radial pulses are 2+ on the right side and 2+ on the left side.        Dorsalis pedis pulses are 2+ on the right side and 2+ on the left side.      Heart sounds: Normal heart sounds.      Comments: Patient with adequate palpable pulse as well as capillary refill to bilateral upper and lower distal extremities.  Pulmonary:      Effort: Pulmonary effort is normal. No respiratory distress.      Breath sounds: Normal breath sounds.   Abdominal:      Palpations: Abdomen is soft.      Tenderness: There is no abdominal tenderness. There is no right CVA tenderness or left CVA tenderness.   Musculoskeletal:         General: No tenderness. Normal range of motion.      Cervical back: No rigidity.      Comments: Patient with adequate bilateral upper and lower extremity motor function without acute deficits or deviation from patient's baseline.   Skin:     General: Skin is warm and dry.      Capillary Refill: Capillary refill takes less than 2 seconds.      Findings: Wound present. No rash.             Comments: As imaged below   Neurological:      General: No focal deficit present.      Mental Status: She is alert and oriented to person, place, and time.      Comments: Patient with adequate motor, and sensation to bilateral upper and lower distal extremities without acute deficits or deviation from patient's baseline.

## 2024-08-19 NOTE — PLAN OF CARE
Problem: Discharge Planning  Goal: Discharge to home or other facility with appropriate resources  Outcome: Progressing  Flowsheets (Taken 8/19/2024 0305)  Discharge to home or other facility with appropriate resources: Identify barriers to discharge with patient and caregiver     Problem: Skin/Tissue Integrity  Goal: Absence of new skin breakdown  Description: 1.  Monitor for areas of redness and/or skin breakdown  2.  Assess vascular access sites hourly  3.  Every 4-6 hours minimum:  Change oxygen saturation probe site  4.  Every 4-6 hours:  If on nasal continuous positive airway pressure, respiratory therapy assess nares and determine need for appliance change or resting period.  Outcome: Progressing     Problem: Safety - Adult  Goal: Free from fall injury  Outcome: Progressing     Problem: Pain  Goal: Verbalizes/displays adequate comfort level or baseline comfort level  Outcome: Progressing     Problem: Skin/Tissue Integrity - Adult  Goal: Incisions, wounds, or drain sites healing without S/S of infection  Outcome: Progressing     Problem: Musculoskeletal - Adult  Goal: Return mobility to safest level of function  Outcome: Progressing  Goal: Maintain proper alignment of affected body part  Outcome: Progressing  Goal: Return ADL status to a safe level of function  Outcome: Progressing     Problem: Gastrointestinal - Adult  Goal: Maintains or returns to baseline bowel function  Outcome: Progressing  Goal: Maintains adequate nutritional intake  Outcome: Progressing     Problem: Infection - Adult  Goal: Absence of infection at discharge  Outcome: Progressing     Problem: Metabolic/Fluid and Electrolytes - Adult  Goal: Electrolytes maintained within normal limits  Outcome: Progressing  Goal: Glucose maintained within prescribed range  Outcome: Progressing

## 2024-08-19 NOTE — H&P
this interval not displayed.       Hematology:  Recent Labs     08/19/24  0013 08/19/24  0615   WBC 7.0 4.8   RBC 3.44* 3.21*   HGB 9.4* 8.7*   HCT 29.3* 28.4*   MCV 85.1 88.4   MCH 27.3 27.1   MCHC 32.0 30.7*   RDW 17.1* 17.3*    252   MPV 9.7 11.3     Chemistry:  Recent Labs     08/18/24  2305 08/19/24  0615    141   K 3.4* 3.2*    111*   CO2 17* 22   GLUCOSE 119* 90   BUN 6 4*   CREATININE <0.4* <0.4*   MG 1.8 1.8   ANIONGAP 14 8*   LABGLOM Can not be calculated Can not be calculated   CALCIUM 7.8* 7.2*     Recent Labs     08/18/24  2305 08/19/24  0304 08/19/24  0606   AST 17  --   --    ALT 22  --   --    ALKPHOS 193*  --   --    BILITOT 0.3  --   --    POCGLU  --  84 89       Imaging/Diagnostics:       CT HEAD WO CONTRAST    Result Date: 8/16/2024  EXAMINATION: CT OF THE HEAD WITHOUT CONTRAST  8/16/2024 6:35 pm TECHNIQUE: CT of the head was performed without the administration of intravenous contrast. Automated exposure control, iterative reconstruction, and/or weight based adjustment of the mA/kV was utilized to reduce the radiation dose to as low as reasonably achievable. COMPARISON: CT brain 07/18/2023. HISTORY: ORDERING SYSTEM PROVIDED HISTORY: fall on  TECHNOLOGIST PROVIDED HISTORY: fall on  Decision Support Exception - unselect if not a suspected or confirmed emergency medical condition->Emergency Medical Condition (MA) Is the patient pregnant?->No Reason for Exam: fall on AC FINDINGS: BRAIN/VENTRICLES: There is no acute intracranial hemorrhage, mass effect or midline shift.  No abnormal extra-axial fluid collection.  The gray-white differentiation is maintained without evidence of an acute infarct.  There is no evidence of hydrocephalus. ORBITS: The visualized portion of the orbits demonstrate no acute abnormality. SINUSES: The visualized paranasal sinuses and mastoid air cells demonstrate no acute abnormality. SOFT TISSUES/SKULL:  No acute abnormality of the visualized skull or  place.  No definite soft tissue gas.     1. Status post left hip arthroplasty explantation. 2. Chronic nondisplaced at least partially ununited fracture of the proximal femoral diaphysis between the 2 most distal cerclage wires. 3. Cortical irregularity of the proximal femur and left acetabulum which could be related to the patient's known osteomyelitis.     CT abdomen pelvis W contrast    Result Date: 8/13/2024  EXAM: CT ABDOMEN PELVIS W CONTRAST REASON FOR EXAM: Female, 44 years, c. diff with cyclic n/v, evaluate for worsening colitis or toxic megacolon. TECHNIQUE: Computed tomography of the abdomen and pelvis is performed in the axial projection from the lung bases to the pubic symphysis. Sagittal and coronal reconstructed images are performed. Dose reduction techniques were achieved by using automated exposure control and/or adjustment of mA and/or KVP according to patient size and/or use of iterative reconstruction technique. Images were performed following intravenous contrast administration. COMPARISON: Plain abdominal films, same date FINDINGS: Lung bases: There is minimal linear atelectasis at the lung bases. There is a tiny left pleural effusion. The visualized portions of the heart are unremarkable. Liver: The liver has decreased attenuation consistent with steatosis. There appears to been partial resection of the left lobe of the liver. Gallbladder: The gallbladder has been removed. Spleen: The spleen is normal. Pancreas: There is diffuse pancreatic atrophy. Adrenal glands: The adrenal glands are normal bilaterally. Right kidney: The kidney is normal in size. There are parapelvic cysts. There is no renal calculus or hydronephrosis. Left kidney: The kidney is normal in size. There are parapelvic cysts. There is no renal calculus or hydronephrosis. Stomach: Postoperative changes are seen to the stomach. Small bowel: No small bowel obstruction. Large bowel: There is a moderate amount of stool throughout the  myelopathy, MDRO (multiple drug resistant organisms) resistance (2010), Mobility impaired, MRSA (methicillin resistant staph aureus) culture positive (02/10/2017), Muscle weakness, Obsessive-compulsive disorder, unspecified, Opioid abuse, in remission ([facility]), Pernicious anemia, Polyneuropathy, unspecified, PTSD (post-traumatic stress disorder), Pulmonary embolism ([facility]) (2010), Suicidal behavior (12/14/2015), SVT (supraventricular tachycardia) ([facility]) (04/07/2017), Type 2 diabetes mellitus, with long-term current use of insulin ([facility]) (2013), Under care of team, and Under care of team.     Plans:     44-year-old female history of chronic osteomyelitis left femur, T2DM, HTN, frequent falls, history of drug and alcohol abuse complicated open hip wound history of MRSA infection, gastric bypass history of PE/DVT, hypercoagulable state secondary to antiphospholipid syndrome, recently discharged from rehab to home on 8/15, now presenting from home with recurrent falls and severe pain due to inability to locate her pain medication    Recurrent falls continued pain-PT OT consult, PMNR consult may need ECF placement social work consulted  Pain management-resume Neurontin,  oxycodone 10 mg Q4 as needed.  Right knee and left femur x-rays from 8/16 noted.  Hypokalemia-replace per protocol  T2DM-controlled sliding scale  HTN-currently controlled resume home medication  History of DVT PE-continue Arixtra  Recent C. difficile-continue to complete p.o. vancomycin        DVT pplx-patient on Arixtra      Madeline Hutchison MD  8/19/2024  10:15 AM

## 2024-08-19 NOTE — PROGRESS NOTES
[facility] Internal Medicine  Basil Servin MD; Gigi Morales MD; Raul Heard MD; MD Madeline Jacobson MD; Wilner Ash MD  [facility] Internal Medicine   IN-PATIENT SERVICE  [facility]                 Date:   8/19/2024  Patientname:  Krista Chiang  Date of admission:  8/18/2024  9:50 PM  MRN:   [id number]  Account:  [id number]  YOB: 1980  PCP:    Arielle Melton APRN - NP  Room:   INGRIS/INGRIS  Code Status:    Prior      Chief Complaint:     Chief Complaint   Patient presents with    Wound Check       History of Present Illness:     Krista Chiang is a 44 y.o. Non- / non  female who presents with Wound Check   and is admitted to the hospital for the management of Intractable pain.    Patient's medical history significant for antiphospholipid syndrome, bipolar disorder, chronic osteomyelitis of left femur, diabetes mellitus, HTN, frequent falls, history of drug abuse, complicated open hip wound, history of MRSA infection, history of PE and DVT, history of hypercoagulable state, and diabetes mellitus type 2.    According to patient, she was discharged from [facility] rehab center on 8/15 following 5 months of treatment for left hip prosthesis infection and femoral osteomyelitis.  Reports that shortly after returning home, she was up with her walker and tripped on a rug and fell.  Reports that she fell several more times that day and came to the ED for evaluation.  At that time, patient reported that she was unable to get her pain medications filled as they needed confirmation from the discharging physician.    Today, patient reports that her oxycodone prescription was filled; however, she is unable to locate the medication and has been in severe pain without it.  Patient states that while she was in the rehab facility, her  moved \"a friend\" into their home.  She is unsure if someone took her medication or if it is misplaced.  Home  1.20 1.0 - 4.8 k/uL    Monocytes Absolute 0.50 0.1 - 1.3 k/uL    Eosinophils Absolute 0.10 0.0 - 0.4 k/uL    Basophils Absolute 0.00 0.0 - 0.2 k/uL       Imaging/Diagnostics:  CT HEAD WO CONTRAST    Result Date: 8/16/2024  No acute intracranial abnormality.     XR KNEE RIGHT (1-2 VIEWS)    Result Date: 8/16/2024  Mild  osteoarthritis of the knee. No acute bony abnormalities are noted     XR FEMUR LEFT (MIN 2 VIEWS)    Result Date: 8/16/2024  1. Status post left hip arthroplasty explantation. 2. Chronic nondisplaced at least partially ununited fracture of the proximal femoral diaphysis between the 2 most distal cerclage wires. 3. Cortical irregularity of the proximal femur and left acetabulum which could be related to the patient's known osteomyelitis.     CT abdomen pelvis W contrast    Result Date: 8/13/2024  IMPRESSION: Postoperative changes of Awa-en-Y gastric bypass. Changes of partial left lobe hepatic resection. Abnormal left hip, with absence of the left femoral head which may have been surgically removed. Findings within the femoral neck and adjacent left acetabulum are concerning for infection/osteomyelitis. Additional nonacute findings, as described above. Report electronically signed by: Dr. Herbert Crocker    *X-ray abdomen KUB supine    Result Date: 8/13/2024  IMPRESSION: Nonobstructive bowel gas pattern. Report electronically signed by: Dr. Bari Holden        Patient status inpatient in the [facility] Problems             Last Modified POA    * (Principal) Intractable pain 8/19/2024 Yes         Disposition 3 days      Consultations:   IP CONSULT TO PRIMARY CARE PROVIDER  IP CONSULT TO PHYSICAL MEDICINE REHAB  IP CONSULT TO SOCIAL WORK    Patient is admitted as inpatient status because of co-morbidities listed above, severity of signs and symptoms as outlined, requirement for current medical therapies and most importantly because of direct risk to patient if care not provided in a hospital  setting.  Expected length of stay > 48 hours.    BERNARDA Gaming - CNP  8/19/2024  2:57 AM     Please note that this chart was generated using voice recognition Dragon dictation software.  Although every effort was made to ensure the accuracy of this automated transcription, some errors in transcription [place] [address].   Phone [phone]

## 2024-08-19 NOTE — PROGRESS NOTES
Comprehensive Nutrition Assessment    Type and Reason for Visit:  Positive Nutrition Screen, Wound (wt loss, poor appetite)    Nutrition Recommendations/Plan:   Will continue to provide 4 carbohydrate choices per tray  Will provide Glucerna and extra high protein foods on all trays      Malnutrition Assessment:  Malnutrition Status:  Moderate malnutrition (08/19/24 1502)    Context:  Chronic Illness     Findings of the 6 clinical characteristics of malnutrition:  Energy Intake:  Mild decrease in energy intake (Comment)  Weight Loss:   (17% wt loss over 9 months)     Body Fat Loss:   (Moderate) Orbital, Triceps   Muscle Mass Loss:   (Moderate) Temples (temporalis)  Fluid Accumulation:  Mild Extremities   Strength:  Not Performed    Nutrition Assessment:    Pt admitted due to Failure to Thrive/Infection of hip joint. Pt was discharged from [facility] on 8/15 after 5 months of treatment for infected joint. Nonhealing wounds noted. Pt has a H/O Bariatric Surgery. Her diet has advanced to 4 carbohydrate choices per tray but she is requesting extra food. She is receptive to supplements.    Nutrition Related Findings:    mild LLE edema, Labs: Glu 90, POC Glu 275, Meds: Reviewed,  ETOH, Bariatric Surgery, DM Wound Type: Multiple (nonhealing)       Current Nutrition Intake & Therapies:    Average Meal Intake: %     ADULT DIET; Regular; 4 carb choices (60 gm/meal)  ADULT ORAL NUTRITION SUPPLEMENT; Lunch, Dinner; Diabetic Oral Supplement    Anthropometric Measures:  Height: 172.7 cm (5' 8\")  Ideal Body Weight (IBW): 140 lbs (64 kg)    Admission Body Weight: 98 kg (216 lb)  Current Body Weight: 98 kg (216 lb), 154.3 % IBW. Weight Source: Bed Scale  Current BMI (kg/m2): 32.9  Usual Body Weight: 118.8 kg (262 lb) (11/23)  % Weight Change (Calculated): -17.6                    BMI Categories: Obese Class 1 (BMI 30.0-34.9)    Estimated Daily Nutrient Needs:  Energy Requirements Based On: Formula  Weight Used for Energy

## 2024-08-19 NOTE — CARE COORDINATION
Case Management Assessment  Initial Evaluation    Date/Time of Evaluation: 8/19/2024 1:20 PM  Assessment Completed by: Helene Vernon RN    If patient is discharged prior to next notation, then this note serves as note for discharge by case management.    Patient Name: Krista Chiang                   YOB: 1980  Diagnosis: Failure to thrive in adult [R62.7]  Intractable pain [R52]  Infection of prosthetic hip joint, subsequent encounter [T84.59XD, Z96.649]                   Date / Time: 8/18/2024  9:50 PM    Patient Admission Status: Inpatient   Readmission Risk (Low < 19, Mod (19-27), High > 27): Readmission Risk Score: 24.4    Current PCP: Arielle Melton APRN - NP  PCP verified by CM? Yes    Chart Reviewed: Yes      History Provided by: Patient  Patient Orientation: Alert and Oriented    Patient Cognition: Alert    Hospitalization in the last 30 days (Readmission):  No    If yes, Readmission Assessment in CM Navigator will be completed.    Advance Directives:      Code Status: Full Code   Patient's Primary Decision Maker is:      Primary Decision Maker: Naima Grant - Parent - [phone]    Secondary Decision Maker: Zachery Grant - Brother/Sister - [phone]    Discharge Planning:    Patient lives with: Spouse/Significant Other Type of Home: Trailer/Mobile Home  Primary Care Giver: Self  Patient Support Systems include: Spouse/Significant Other, Parent   Current Financial resources: Medicare, Medicaid  Current community resources: None  Current services prior to admission: Durable Medical Equipment            Current DME: Walker, Wheelchair, Shower Chair, Bedside Commode            Type of Home Care services:  None    ADLS  Prior functional level: Assistance with the following:, Bathing, Dressing, Cooking, Housework, Shopping, Mobility  Current functional level: Assistance with the following:, Bathing, Cooking, Housework, Shopping, Mobility    PT AM-PAC: 8 /24  OT AM-PAC: 12

## 2024-08-19 NOTE — PROGRESS NOTES
Spiritual Health Assessment/Progress Note  [facility]    (P) Spiritual/Emotional Needs,  ,  ,      Name: Krista Chiang MRN: [id number]    Age: 44 y.o.     Sex: female   Language: English   Synagogue: [employer]   Intractable pain     Date: 8/19/2024            Total Time Calculated: (P) 15 min              Spiritual Assessment began in [facility]        Referral/Consult From: (P) Rounding   Encounter Overview/Reason: (P) Spiritual/Emotional Needs  Service Provided For: (P) Patient    PT updated her medical conditions. PT was in great distress when she mentioned about how she came back to hospital. Pt does not want to go to a nursing home, but no choice as her house is not safe for her due to her  does not clean. PT has 21 y.o. daughter who refuses to go clean after her father so she stay with her grandmother. PT was very emotional when she talked about her  who has been together for 15 years. She stated that she is lazy and dirty. Welcomed prayer.    Amy, Belief, Meaning:   Patient has beliefs or practices that help with coping during difficult times  Family/Friends No family/friends present      Importance and Influence:  Patient has spiritual/personal beliefs that influence decisions regarding their health  Family/Friends no family/friends present    Community:  Patient feels well-supported. Support system includes: Spouse/Partner and Children (not spouse, but mother)  Family/Friends Other: NA    Assessment and Plan of Care:     Patient Interventions include: Facilitated expression of thoughts and feelings, Explored spiritual coping/struggle/distress and theological reflection, and Affirmed coping skills/support systems  Family/Friends Interventions include: Other: NA    Patient Plan of Care: Spiritual Care available upon further referral  Family/Friends Plan of Care: Other: NA    Electronically signed by Chaplain Guillermo on 8/19/2024 at 3:06 PM

## 2024-08-19 NOTE — PROGRESS NOTES
[facility]   Occupational Therapy Evaluation  Date: 24  Patient Name: Krista Chiang       Room:   MRN: [id number]  Account: [id number]   : 1980  (44 y.o.) Gender: female     Discharge Recommendations:  Further Occupational Therapy is recommended upon facility discharge.    OT Equipment Recommendations  Other: TBD    Referring Practitioner: Dr. Conway  Diagnosis: Failure to thrive; Intractable pain; Infection in L hip   Additional Pertinent Hx: Pt has had multiple surgeries on L hip following infected JODEE, most recently all hardware has been removed as well as 4\" of her femur, no spacer was placed.  [facility]  Was sent home but since has fallen 4x and her wound vac was torn off during one of those falls.    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 encounter, Constipation, Depression, Fibromyalgia, Genital herpes, unspecified, GERD (gastroesophageal reflux disease), H/O bariatric surgery, History of pulmonary embolism, Hx of blood clots, Hypertension, Lives in nursing home, Lumbosacral spondylosis without myelopathy, MDRO (multiple drug resistant organisms) resistance, Mobility impaired, MRSA (methicillin resistant staph aureus) culture positive, Muscle weakness, Obsessive-compulsive disorder, unspecified, Opioid abuse, in remission ([facility]), Pernicious anemia, Polyneuropathy, unspecified, PTSD (post-traumatic stress disorder), Pulmonary embolism ([facility]), Suicidal behavior, SVT (supraventricular tachycardia) ([facility]), Type 2 diabetes mellitus, with long-term current use of insulin ([facility]), Under care of team, and Under care of team.    Past Surgical  in the past year?: Yes (Pt states she was trying to go to the bathroom, 4 since left [facility])  Receives Help From: Family ()  ADL Assistance: Needs assistance (bedside commode, requires A for socks/shoes)  Homemaking Assistance: Needs assistance  Homemaking Responsibilities: Yes  Ambulation Assistance: Needs assistance (w/RW and Ax1; or used w/c for mobility)  Transfer Assistance: Independent (No device)  Active : Yes  Mode of Transportation: SUV  Leisure & Hobbies: Going outside  IADL Comments: Pt sleeps in hospital bed  Additional Comments: Pt very emotional when talking about her home situation.  Pt describes a hoarding situation and is very frustrated with her spouse for not getting it cleaned up prior to her discharge home. Pt wants to go home since she has not been for \"17 months\" but verbalizes understanding that it is not safe for her in its current state.    Objective  Orientation  Overall Orientation Status: Within Functional Limits  Orientation Level: Oriented X4  Cognition  Overall Cognitive Status: WFL     ADL  Feeding: Setup  Grooming: Stand by assistance  UE Bathing: Minimal assistance  LE Bathing: Maximum assistance  UE Dressing: Moderate assistance  LE Dressing: Dependent/Total  Toileting: Dependent/Total  Functional Mobility Skilled Clinical Factors: Pt able to roll both directions Mod I with use of bed railing; Further mobility assessment deferred awaiting wound vac placement on L hip.  Additional Comments: Above levels based on pt report, skilled observation, and clinical reasoning.  Above levels also based on pt completing tasks in bed 2* unable to assess mobility to EOB at this time.    Gross Assessment  AROM: Within functional limits  Strength: Within functional limits  Coordination: Within functional limits  Tone: Normal  Sensation: Intact    Hand Dominance  Hand Dominance: Right    Bed Mobility  Bed mobility  Rolling to Left: Modified independent  Rolling to Right: Modified  taking care of personal grooming?: A Little  How much help for eating meals?: A Little  AM-PAC Inpatient Daily Activity Raw Score: 12  AM-PAC Inpatient ADL T-Scale Score : 30.6  ADL Inpatient CMS 0-100% Score: 66.57  ADL Inpatient CMS G-Code Modifier : CL    Goals  Patient Goals   Patient goals : Pt wants to get back to being able to walk with RW.  Short Term Goals  Time Frame for Short Term Goals: By Discharge  Short Term Goal 1: Pt will actively participate in self-care routine to promote increased overall strength and activity tolerance.  Short Term Goal 2: Pt will complete UB ADL's with SBA and LB ADL's with Mod A using AE/modified techniques as needed.  Short Term Goal 3: Pt will actively participate in 15-20 minutes of therapeutic exercise/activity to promote increased overall safety and independence with self-care and mobility.  Short Term Goal 4: OT to assess bed mobility and transfers when medically appropriate and set goals accordingly.    Plan  Occupational Therapy Plan  Times Per Week: 4-5  Times Per Day: Once a day  Current Treatment Recommendations: Balance training, Functional mobility training, Endurance training, Pain management, Safety education & training, Patient/Caregiver education & training, Equipment evaluation, education, & procurement, Self-Care / ADL, Co-Treatment    OT Individual Minutes  OT Individual Minutes  Time In: 1008  Time Out: 1036  Minutes: 28  Time Code Minutes   Timed Code Treatment Minutes: 10 Minutes    Electronically signed by KATHLEEN Lyn on 8/19/24 at 12:09 PM EDT

## 2024-08-19 NOTE — PLAN OF CARE
Problem: Discharge Planning  Goal: Discharge to home or other facility with appropriate resources  8/19/2024 1541 by Ruba Tafoya RN  Outcome: Progressing  Flowsheets (Taken 8/19/2024 0918)  Discharge to home or other facility with appropriate resources:   Identify barriers to discharge with patient and caregiver   Arrange for needed discharge resources and transportation as appropriate   Identify discharge learning needs (meds, wound care, etc)   Refer to discharge planning if patient needs post-hospital services based on physician order or complex needs related to functional status, cognitive ability or social support system  8/19/2024 0622 by Valarie Toure RN  Outcome: Progressing  Flowsheets (Taken 8/19/2024 0305)  Discharge to home or other facility with appropriate resources: Identify barriers to discharge with patient and caregiver     Problem: Skin/Tissue Integrity  Goal: Absence of new skin breakdown  Description: 1.  Monitor for areas of redness and/or skin breakdown  2.  Assess vascular access sites hourly  3.  Every 4-6 hours minimum:  Change oxygen saturation probe site  4.  Every 4-6 hours:  If on nasal continuous positive airway pressure, respiratory therapy assess nares and determine need for appliance change or resting period.  8/19/2024 1541 by Ruba Tafoya RN  Outcome: Progressing  8/19/2024 0622 by Valarie Toure RN  Outcome: Progressing     Problem: Safety - Adult  Goal: Free from fall injury  8/19/2024 1541 by Ruba Tafoya RN  Outcome: Progressing  8/19/2024 0622 by Valarie Toure RN  Outcome: Progressing     Problem: Pain  Goal: Verbalizes/displays adequate comfort level or baseline comfort level  8/19/2024 1541 by Ruba Tafoya RN  Outcome: Progressing  8/19/2024 0622 by Valarie Toure RN  Outcome: Progressing     Problem: Skin/Tissue Integrity - Adult  Goal: Incisions, wounds, or drain sites healing without S/S of infection  8/19/2024 1541 by Ruba Tafoya RN  Outcome:

## 2024-08-20 PROBLEM — E44.1 MILD MALNUTRITION (HCC): Status: ACTIVE | Noted: 2024-08-20

## 2024-08-20 LAB
ANION GAP SERPL CALCULATED.3IONS-SCNC: 9 MMOL/L (ref 9–17)
BASOPHILS # BLD: 0.05 K/UL (ref 0–0.2)
BASOPHILS NFR BLD: 1 % (ref 0–2)
BUN SERPL-MCNC: 6 MG/DL (ref 6–20)
CALCIUM SERPL-MCNC: 7.5 MG/DL (ref 8.6–10.4)
CHLORIDE SERPL-SCNC: 109 MMOL/L (ref 98–107)
CO2 SERPL-SCNC: 19 MMOL/L (ref 20–31)
CREAT SERPL-MCNC: <0.4 MG/DL (ref 0.5–0.9)
EOSINOPHIL # BLD: 0.16 K/UL (ref 0–0.4)
EOSINOPHILS RELATIVE PERCENT: 3 % (ref 0–4)
ERYTHROCYTE [DISTWIDTH] IN BLOOD BY AUTOMATED COUNT: 18.6 % (ref 11.5–14.9)
GFR, ESTIMATED: ABNORMAL ML/MIN/1.73M2
GLUCOSE BLD-MCNC: 140 MG/DL (ref 65–105)
GLUCOSE BLD-MCNC: 184 MG/DL (ref 65–105)
GLUCOSE BLD-MCNC: 217 MG/DL (ref 65–105)
GLUCOSE BLD-MCNC: 240 MG/DL (ref 65–105)
GLUCOSE SERPL-MCNC: 163 MG/DL (ref 70–99)
HCT VFR BLD AUTO: 37.1 % (ref 36–46)
HGB BLD-MCNC: 10 G/DL (ref 12–16)
LYMPHOCYTES NFR BLD: 1.27 K/UL (ref 1–4.8)
LYMPHOCYTES RELATIVE PERCENT: 24 % (ref 24–44)
MCH RBC QN AUTO: 27.5 PG (ref 26–34)
MCHC RBC AUTO-ENTMCNC: 27 G/DL (ref 31–37)
MCV RBC AUTO: 101.6 FL (ref 80–100)
MONOCYTES NFR BLD: 0.42 K/UL (ref 0.1–1.3)
MONOCYTES NFR BLD: 8 % (ref 1–7)
MORPHOLOGY: ABNORMAL
MORPHOLOGY: ABNORMAL
NEUTROPHILS NFR BLD: 64 % (ref 36–66)
NEUTS SEG NFR BLD: 3.4 K/UL (ref 1.3–9.1)
PLATELET # BLD AUTO: 168 K/UL (ref 150–450)
PMV BLD AUTO: 11.4 FL (ref 6–12)
POTASSIUM SERPL-SCNC: 4.4 MMOL/L (ref 3.7–5.3)
RBC # BLD AUTO: 3.65 M/UL (ref 4–5.2)
SODIUM SERPL-SCNC: 137 MMOL/L (ref 135–144)
WBC OTHER # BLD: 5.3 K/UL (ref 3.5–11)

## 2024-08-20 PROCEDURE — 80048 BASIC METABOLIC PNL TOTAL CA: CPT

## 2024-08-20 PROCEDURE — 36415 COLL VENOUS BLD VENIPUNCTURE: CPT

## 2024-08-20 PROCEDURE — 6370000000 HC RX 637 (ALT 250 FOR IP): Performed by: NURSE PRACTITIONER

## 2024-08-20 PROCEDURE — 6360000002 HC RX W HCPCS: Performed by: INTERNAL MEDICINE

## 2024-08-20 PROCEDURE — 82947 ASSAY GLUCOSE BLOOD QUANT: CPT

## 2024-08-20 PROCEDURE — 2580000003 HC RX 258: Performed by: NURSE PRACTITIONER

## 2024-08-20 PROCEDURE — 99232 SBSQ HOSP IP/OBS MODERATE 35: CPT | Performed by: INTERNAL MEDICINE

## 2024-08-20 PROCEDURE — 99232 SBSQ HOSP IP/OBS MODERATE 35: CPT | Performed by: PHYSICAL MEDICINE & REHABILITATION

## 2024-08-20 PROCEDURE — 85025 COMPLETE CBC W/AUTO DIFF WBC: CPT

## 2024-08-20 PROCEDURE — 1200000000 HC SEMI PRIVATE

## 2024-08-20 PROCEDURE — 97530 THERAPEUTIC ACTIVITIES: CPT

## 2024-08-20 RX ORDER — OXYCODONE HYDROCHLORIDE 15 MG/1
15 TABLET ORAL EVERY 6 HOURS PRN
Qty: 12 TABLET | Refills: 0 | Status: SHIPPED | OUTPATIENT
Start: 2024-08-20 | End: 2024-08-23

## 2024-08-20 RX ADMIN — VANCOMYCIN HYDROCHLORIDE 125 MG: 125 CAPSULE ORAL at 06:29

## 2024-08-20 RX ADMIN — DICYCLOMINE HYDROCHLORIDE 10 MG: 10 CAPSULE ORAL at 06:28

## 2024-08-20 RX ADMIN — CHOLESTYRAMINE 4 G: 4 POWDER, FOR SUSPENSION ORAL at 08:28

## 2024-08-20 RX ADMIN — SODIUM CHLORIDE, PRESERVATIVE FREE 10 ML: 5 INJECTION INTRAVENOUS at 20:01

## 2024-08-20 RX ADMIN — MORPHINE SULFATE 2 MG: 2 INJECTION, SOLUTION INTRAMUSCULAR; INTRAVENOUS at 17:09

## 2024-08-20 RX ADMIN — MORPHINE SULFATE 2 MG: 2 INJECTION, SOLUTION INTRAMUSCULAR; INTRAVENOUS at 22:05

## 2024-08-20 RX ADMIN — DICYCLOMINE HYDROCHLORIDE 10 MG: 10 CAPSULE ORAL at 20:01

## 2024-08-20 RX ADMIN — OXYCODONE HYDROCHLORIDE 10 MG: 10 TABLET ORAL at 06:29

## 2024-08-20 RX ADMIN — BUPROPION HYDROCHLORIDE 150 MG: 150 TABLET, EXTENDED RELEASE ORAL at 08:28

## 2024-08-20 RX ADMIN — MORPHINE SULFATE 2 MG: 2 INJECTION, SOLUTION INTRAMUSCULAR; INTRAVENOUS at 08:28

## 2024-08-20 RX ADMIN — OXYCODONE HYDROCHLORIDE 10 MG: 10 TABLET ORAL at 14:59

## 2024-08-20 RX ADMIN — OXYCODONE HYDROCHLORIDE 10 MG: 10 TABLET ORAL at 19:58

## 2024-08-20 RX ADMIN — SODIUM CHLORIDE, PRESERVATIVE FREE 10 ML: 5 INJECTION INTRAVENOUS at 08:29

## 2024-08-20 RX ADMIN — VANCOMYCIN HYDROCHLORIDE 125 MG: 125 CAPSULE ORAL at 11:09

## 2024-08-20 RX ADMIN — PRAVASTATIN SODIUM 40 MG: 40 TABLET ORAL at 20:01

## 2024-08-20 RX ADMIN — INSULIN LISPRO 2 UNITS: 100 INJECTION, SOLUTION INTRAVENOUS; SUBCUTANEOUS at 11:13

## 2024-08-20 RX ADMIN — DICYCLOMINE HYDROCHLORIDE 10 MG: 10 CAPSULE ORAL at 17:09

## 2024-08-20 RX ADMIN — OXYCODONE HYDROCHLORIDE 10 MG: 10 TABLET ORAL at 11:09

## 2024-08-20 RX ADMIN — DICYCLOMINE HYDROCHLORIDE 10 MG: 10 CAPSULE ORAL at 11:09

## 2024-08-20 RX ADMIN — VANCOMYCIN HYDROCHLORIDE 125 MG: 125 CAPSULE ORAL at 00:37

## 2024-08-20 RX ADMIN — DILTIAZEM HYDROCHLORIDE 120 MG: 120 CAPSULE, COATED, EXTENDED RELEASE ORAL at 08:28

## 2024-08-20 RX ADMIN — GABAPENTIN 300 MG: 300 CAPSULE ORAL at 13:18

## 2024-08-20 RX ADMIN — FONDAPARINUX SODIUM 7.5 MG: 10 INJECTION, SOLUTION SUBCUTANEOUS at 08:27

## 2024-08-20 RX ADMIN — MORPHINE SULFATE 2 MG: 2 INJECTION, SOLUTION INTRAMUSCULAR; INTRAVENOUS at 03:19

## 2024-08-20 RX ADMIN — FLUOXETINE HYDROCHLORIDE 40 MG: 20 CAPSULE ORAL at 08:28

## 2024-08-20 RX ADMIN — LORAZEPAM 0.5 MG: 0.5 TABLET ORAL at 17:09

## 2024-08-20 RX ADMIN — GABAPENTIN 300 MG: 300 CAPSULE ORAL at 20:01

## 2024-08-20 RX ADMIN — VANCOMYCIN HYDROCHLORIDE 125 MG: 125 CAPSULE ORAL at 18:01

## 2024-08-20 RX ADMIN — GABAPENTIN 300 MG: 300 CAPSULE ORAL at 08:28

## 2024-08-20 RX ADMIN — OXYCODONE HYDROCHLORIDE 10 MG: 10 TABLET ORAL at 02:27

## 2024-08-20 RX ADMIN — MORPHINE SULFATE 2 MG: 2 INJECTION, SOLUTION INTRAMUSCULAR; INTRAVENOUS at 13:18

## 2024-08-20 RX ADMIN — PANTOPRAZOLE SODIUM 40 MG: 40 TABLET, DELAYED RELEASE ORAL at 06:29

## 2024-08-20 ASSESSMENT — PAIN DESCRIPTION - FREQUENCY
FREQUENCY: CONTINUOUS

## 2024-08-20 ASSESSMENT — PAIN SCALES - GENERAL
PAINLEVEL_OUTOF10: 7
PAINLEVEL_OUTOF10: 0
PAINLEVEL_OUTOF10: 7
PAINLEVEL_OUTOF10: 8
PAINLEVEL_OUTOF10: 7
PAINLEVEL_OUTOF10: 5
PAINLEVEL_OUTOF10: 7
PAINLEVEL_OUTOF10: 8
PAINLEVEL_OUTOF10: 5
PAINLEVEL_OUTOF10: 7
PAINLEVEL_OUTOF10: 7
PAINLEVEL_OUTOF10: 8

## 2024-08-20 ASSESSMENT — PAIN DESCRIPTION - LOCATION
LOCATION: HIP

## 2024-08-20 ASSESSMENT — PAIN DESCRIPTION - PAIN TYPE
TYPE: ACUTE PAIN;CHRONIC PAIN;SURGICAL PAIN
TYPE: ACUTE PAIN;CHRONIC PAIN
TYPE: ACUTE PAIN;CHRONIC PAIN
TYPE: CHRONIC PAIN;ACUTE PAIN;SURGICAL PAIN

## 2024-08-20 ASSESSMENT — PAIN DESCRIPTION - ORIENTATION
ORIENTATION: LEFT

## 2024-08-20 ASSESSMENT — PAIN DESCRIPTION - DESCRIPTORS
DESCRIPTORS: BURNING;SHARP

## 2024-08-20 ASSESSMENT — PAIN DESCRIPTION - ONSET
ONSET: ON-GOING

## 2024-08-20 NOTE — PROGRESS NOTES
Physical Therapy  Facility/Department: [facility] MED SURG  Daily Treatment Note  NAME: Krista Chiang  : 1980  MRN: [id number]    Date of Service: 2024    Discharge Recommendations:  Patient would benefit from continued therapy after discharge, Continue to assess pending progress        Patient Diagnosis(es): The primary encounter diagnosis was Failure to thrive in adult. A diagnosis of Infection of prosthetic hip joint, subsequent encounter was also pertinent to this visit.      Activity Tolerance: Treatment limited secondary to medical complications     Plan    Physical Therapy Plan  Specific Instructions for Next Treatment: Increase independence with bed mobility, re-assess transfer and ambulation abilities once wound vac is placed.  Current Treatment Recommendations: Strengthening;Balance training;Functional mobility training;Transfer training;ADL/Self-care training;Gait training;Wheelchair mobility training;Endurance training;Neuromuscular re-education;Pain management;Safety education & training;Patient/Caregiver education & training;Equipment evaluation, education, & procurement;Modalities;Therapeutic activities     Restrictions  Restrictions/Precautions  Restrictions/Precautions: Contact Precautions, Fall Risk, Up as Tolerated  Required Braces or Orthoses?: No (denies)  Implants present? :  (IVC filter)  Position Activity Restriction  Other position/activity restrictions: As tolerated     Subjective    Pt resting in bed upon entering room, wound vac placed yesterday, per nursing pt on bedrest.  Pain: Pt reports L hip/knee pain at 8/10.     Objective      Bed Mobility Training  Rolling: Stand-by assistance (to R side.)  Supine to Sit: Minimum assistance (assist for L LE, HOB elevated.)  Sit to Supine: Moderate assistance;Additional time ((B) LE progression to EOB)  Scooting: Stand-by assistance    Transfer Training: No (per nursing patient on bedrest.)       PT Exercises  A/AROM Exercises: R LE hip abd,  heel slides, SLR SBA-MIN  Circulation/Endurance Exercises: (B) ankle pumps 10 reps SBA  Static Sitting Balance Exercises: sat EOB 2 minutes SBA, patient states it becomes uncomfortable on L hip to sit for long periods of time.             Goals  Short Term Goals  Time Frame for Short Term Goals: 5-7 x per week  Short Term Goal 1: Pt to demonstrate MOD I with bed mobility tasks  Short Term Goal 2: Pt to demonstrate an increased tolerance to theraputic activity for 30-40 minutes to promote an increase in functional endurance  Short Term Goal 3: Pt to demonstrate ability to sit EOB with MIN A x1 for 5 minutes  Additional Goals?: No  Patient Goals   Patient Goals : To recieve further PT before returning home    Education  Patient Education  Education Given To: Patient  Education Provided: Role of Therapy;Home Exercise Program  Education Method: Demonstration;Verbal  Barriers to Learning: None  Education Outcome: Continued education needed    AM-PAC - Mobility  AM-PAC Basic Mobility - Inpatient   How much help is needed turning from your back to your side while in a flat bed without using bedrails?: A Little  How much help is needed moving from lying on your back to sitting on the side of a flat bed without using bedrails?: A Little  How much help is needed moving to and from a bed to a chair?: Total  How much help is needed standing up from a chair using your arms?: Total  How much help is needed walking in hospital room?: Total  How much help is needed climbing 3-5 steps with a railing?: Total  AM-PAC Inpatient Mobility Raw Score : 10  AM-PAC Inpatient T-Scale Score : 32.29  Mobility Inpatient CMS 0-100% Score: 76.75  Mobility Inpatient CMS G-Code Modifier : CL         Therapy Time   Individual Concurrent Group Co-treatment   Time In 1045         Time Out 1104         Minutes 19                 Gosia Junior, PTA

## 2024-08-20 NOTE — PROGRESS NOTES
IN-PATIENT SERVICE  [facility] Internal Medicine  [facility] Internal Medicine   Basil Servin MD; Gigi Morales MD; Raul Heard MD; Ezio Conway MD,   Madeline Hutchsion MD; Wilner Ash MD; Crystal Estrada MD; Julita Gary MD; Quynh Mendoza MD      HISTORY AND PHYSICAL EXAMINATION            Date:   8/20/2024  Patient name:  Krista Chiang  MRN:   [id number]  Account:  [id number]  YOB: 1980  PCP:    Arielle Melton APRN - NP  Code Status:    Full Code    Chief Complaint:     Chief Complaint   Patient presents with    Wound Check         History Obtained From:     Patient, EMR, nursing staff    HPI     This patient is a 44 y.o. Non- / non  femalewho presents with    Krista Chiang is a 44 y.o. Non- / non  female who presents with Wound Check   and is admitted to the hospital for the management of Intractable pain.     Patient's medical history significant for antiphospholipid syndrome, bipolar disorder, chronic osteomyelitis of left femur, diabetes mellitus, HTN, frequent falls, history of drug abuse, complicated open hip wound, history of MRSA infection, history of PE and DVT, history of hypercoagulable state, and diabetes mellitus type 2.     According to patient, she was discharged from [facility] rehab center on 8/15 following 5 months of treatment for left hip prosthesis infection and femoral osteomyelitis.  Reports that shortly after returning home, she was up with her walker and tripped on a rug and fell.  Reports that she fell several more times that day and came to the ED for evaluation.  At that time, patient reported that she was unable to get her pain medications filled as they needed confirmation from the discharging physician.     Today, patient reports that her oxycodone prescription was filled; however, she is unable to locate the medication and has been in severe pain without it.  Patient states that while she was  pelvis. Possible degree of edematous changes noted, versus degree of distention, which could could correlate with enteritis in the proper clinical scenario No obvious signs of obstruction. Report electronically signed by: Dr. Devante Juarez       Current Facility-Administered Medications   Medication Dose Route Frequency Provider Last Rate Last Admin    glucose chewable tablet 16 g  4 tablet Oral PRN Laquita Duron APRN - CNP        dextrose bolus 10% 125 mL  125 mL IntraVENous PRLaquita Overton APRN - CNP        Or    dextrose bolus 10% 250 mL  250 mL IntraVENous PRN Laquita Duron APRN - CNP        glucagon injection 1 mg  1 mg IntraMUSCular PRN Laquita Duron APRN - CNP        dextrose 10 % infusion   IntraVENous Continuous PRN Laquita Duron APRN - CNP        insulin lispro (HUMALOG,ADMELOG) injection vial 0-8 Units  0-8 Units SubCUTAneous TID WC Laquita Duron APRN - CNP   4 Units at 08/19/24 1107    insulin lispro (HUMALOG,ADMELOG) injection vial 0-4 Units  0-4 Units SubCUTAneous Nightly Laquita Duron APRN - CNP        buPROPion (WELLBUTRIN XL) extended release tablet 150 mg  150 mg Oral QAM Laquita Duron APRN - CNP   150 mg at 08/20/24 0828    cholestyramine light packet 4 g  1 packet Oral Daily Laquita Duron APRN - CNP   4 g at 08/20/24 0828    dicyclomine (BENTYL) capsule 10 mg  10 mg Oral 4x Daily AC & HS Laquita Duron APRN - CNP   10 mg at 08/20/24 1109    dilTIAZem (CARDIZEM CD) extended release capsule 120 mg  120 mg Oral Daily Laquita Duron APRN - CNP   120 mg at 08/20/24 0828    FLUoxetine (PROZAC) capsule 40 mg  40 mg Oral Daily Laquita Duron APRN - CNP   40 mg at 08/20/24 0828    promethazine (PHENERGAN) tablet 25 mg  25 mg Oral Q8H PRN Laquita Duron APRN - CNP        pravastatin (PRAVACHOL) tablet 40 mg  40 mg Oral Nightly Laquita Duron APRN - CNP   40 mg at 08/19/24 2102    oxyCODONE HCl (OXY-IR) immediate release tablet 10 mg  10 mg Oral Q4H PRN Laquita Duron APRN - CNP   10 mg at  08/20/24 1109    pantoprazole (PROTONIX) tablet 40 mg  40 mg Oral QAM AC Laquita Duron APRN - CNP   40 mg at 08/20/24 0629    melatonin tablet 6 mg  6 mg Oral Nightly PRN Laquita Duron APRN - CNP        sodium chloride flush 0.9 % injection 5-40 mL  5-40 mL IntraVENous 2 times per day Laquita Duron APRN - CNP   10 mL at 08/20/24 0829    sodium chloride flush 0.9 % injection 10 mL  10 mL IntraVENous PRN Laquita Duron APRN - CNP   10 mL at 08/19/24 2250    0.9 % sodium chloride infusion   IntraVENous PRN Laquita Duron APRN - CNP        potassium chloride (KLOR-CON M) extended release tablet 40 mEq  40 mEq Oral PRN Laquita Duron APRN - CNP   40 mEq at 08/19/24 0852    Or    potassium bicarb-citric acid (EFFER-K) effervescent tablet 40 mEq  40 mEq Oral PRLaquita Overton APRN - CNP        Or    potassium chloride 10 mEq/100 mL IVPB (Peripheral Line)  10 mEq IntraVENous PRN Laquita Duron APRN - CNP        magnesium sulfate 2000 mg in water 50 mL IVPB  2,000 mg IntraVENous PRN Laquita Duron APRN - CNP        ondansetron (ZOFRAN-ODT) disintegrating tablet 4 mg  4 mg Oral Q8H PRLaquita Overton APRN - CNP        Or    ondansetron (ZOFRAN) injection 4 mg  4 mg IntraVENous Q6H PRN Laquita Duron APRN - CNP        polyethylene glycol (GLYCOLAX) packet 17 g  17 g Oral Daily PRN Laquita Duron APRN - CNP        bisacodyl (DULCOLAX) suppository 10 mg  10 mg Rectal Daily PRN Laquita Duron APRN - CNP        acetaminophen (TYLENOL) tablet 650 mg  650 mg Oral Q6H PRLaquita Ovetron APRN - CNP        Or    acetaminophen (TYLENOL) suppository 650 mg  650 mg Rectal Q6H PRLaquita Overton APRN - CNP        gabapentin (NEURONTIN) capsule 300 mg  300 mg Oral TID Laquita Duron APRN - CNP   300 mg at 08/20/24 0828    LORazepam (ATIVAN) tablet 0.5 mg  0.5 mg Oral Q8H PRN Laquita Duron, APRN - CNP   0.5 mg at 08/19/24 2102    vancomycin (VANCOCIN) capsule 125 mg  125 mg Oral 4 times per day Laquita Duron, BERNARDA Rocha CNP   125 mg at

## 2024-08-20 NOTE — PROGRESS NOTES
Nutrition Note    Type and Reason for Visit:   Consult, Wound (Calixto Score)    Nutrition Recommendations/Plan:   Continue current diet with added double protein portions.  Provide Glucerna with meals.  Night protein snacks added to CBOARD with dinner tray.     Nutrition Assessment:    Pt reports fair intake of meals and is willing to take Glucerna with meals. Pt requested double protein portions and protein night snacks that were added to CBORD.    Nutrition Related Findings:    Gluc 163. Edema: +2 pitting LLE. BM 8/19. Meds reviewed. Wound Type: Multiple, Wound Vac (Non-healing)       Current Nutrition Intake & Therapies:    Average Meal Intake: 51-75%  Average Supplements Intake: 26-50%  ADULT DIET; Regular; 4 carb choices (60 gm/meal); Double Protein Portions  ADULT ORAL NUTRITION SUPPLEMENT; Breakfast, Lunch, Dinner; Diabetic Oral Supplement    Refer to initial Comprehensive Nutrition Assessment 8/19/24 for additional information.     Jes Arzola, MS, LD  Contact: [phone]  Reviewed and revised by:  Laura Rao R.D., [place]

## 2024-08-20 NOTE — PROGRESS NOTES
Physical Medicine & Rehabilitation  Progress Note    8/20/2024 3:50 PM     CC: Ambulatory and ADL dysfunction due to left hip prosthesis and femoral osteomyelitis with C. Difficile    Internal medicine-recurrent falls, pain management oxycodone, C. difficile complete Vanco on Arixtra for DVT/PE history    Subjective:   No complaints.  A bit emotional as having personal social issues     ROS:  Denies fevers, chills, sweats.  No chest pain, palpitations, lightheadedness.  Denies coughing, wheezing or shortness of breath.  Denies abdominal pain, nausea, diarrhea or constipation.  No new areas of joint pain.  Denies new areas of numbness or weakness.  Denies new anxiety or depression issues.  No new skin problems.    Rehabilitation:   PT:    Bed mobility  Rolling to Left: Modified independent  Rolling to Right: Modified independent  Bed Mobility Comments: Pt able to roll with increased time and use of bed railing; Further mobility deferred awaiting wound vac placement on L hip; Pt's L leg tends to roll into internal rotation, pillow/rolled towel/wedge placed to promote more neutral alignment    Transfers  Comment: NT due to current wound concerns    Ambulation  Comments: NT due to current wound concerns  More Ambulation?: No       8/20  Bed Mobility Training  Rolling: Stand-by assistance (to R side.)  Supine to Sit: Minimum assistance (assist for L LE, HOB elevated.)  Sit to Supine: Moderate assistance;Additional time ((B) LE progression to EOB)  Scooting: Stand-by assistance     Transfer Training: No (per nursing patient on bedrest.)         OT:    ADL  Feeding: Setup  Grooming: Stand by assistance  UE Bathing: Minimal assistance  LE Bathing: Maximum assistance  UE Dressing: Moderate assistance  LE Dressing: Dependent/Total  Toileting: Dependent/Total  Functional Mobility Skilled Clinical Factors: Pt able to roll both directions Mod I with use of bed railing; Further mobility assessment deferred awaiting wound vac  Handicap height  Bathroom Equipment: Grab bars around toilet, Commode, Hand-held shower  Bathroom Accessibility: Not accessible  Home Equipment: Wheelchair - Manual, Walker - Rolling  Has the patient had two or more falls in the past year or any fall with injury in the past year?: Yes (Pt states she was trying to go to the bathroom, 4 since left [facility])  Receives Help From: Family ()  ADL Assistance: Needs assistance (bedside commode, requires A for socks/shoes)  Homemaking Assistance: Needs assistance  Homemaking Responsibilities: Yes  Ambulation Assistance: Needs assistance (w/RW and Ax1; or used w/c for mobility)  Transfer Assistance: Independent (No device)  Active : Yes  Mode of Transportation: SUV  Leisure & Hobbies: Going outside  IADL Comments: Pt sleeps in hospital bed  Additional Comments: Pt very emotional when talking about her home situation.  Pt describes a hoarding situation and is very frustrated with her spouse for not getting it cleaned up prior to her discharge home. Pt wants to go home since she has not been for \"17 months\" but verbalizes understanding that it is not safe for her in its current state.    mpression: Ms. Krista Chiang is a 44 y.o. female with a history of Intractable pain     Status post left hip prosthesis infection and femoral osteomyelitis, chronic osteomyelitis left femur-patient notes needs wound VAC, follow-up with wound care-wound VAC  C. difficile oral vancomycin  Antiphospholipid syndrome  Bipolar disorder/obsessive-compulsive disorder/history of suicide attempt-Wellbutrin, Prozac  Diabetes  Hypertension hypercholesterolemia-diltiazem, Pravachol  History of PE/DVT/hypercoagulable  Chronic pain-notes was unable to get her pain medication-unable to locate  Fibromyalgia-Neurontin  Pain-oxycodone  Insomnia-melatonin     Recommendations:  1. Diagnosis: Chronic left hip prosthesis/osteomyelitis  2. Therapy: Pending  3. Medical  Necessity:  4. Support: Clarify,  noted recently discharged from [facility] rehab 8/15 following 5 months of treatment left hip prosthesis, was in ARU for 3 weeks, states on return home mobile home in disarray falls due to clutter, unable return  5. Rehab recommendation: Patient has just completed inpatient rehab at [facility] 3 weeks, continues with falls -recommend SNF as has already completed acute rehab and patient currently notes does not have a disposition  6. DVT proph: Betty Guillen MD     Electronically signed by Eliel Guillen MD on 8/20/2024 at 3:50 PM     This note is created with the assistance of a speech recognition program.  While intending to generate a document that actually reflects the content of the visit, the document can still have some errors including those of syntax and sound a like substitutions which may escape proof reading.  In such instances, actual meaning can be extrapolated by contextual diversion

## 2024-08-20 NOTE — CARE COORDINATION
DISCHARGE PLANNING NOTE:    LSW following for discharge placement. Patient has been accepted by Ally Espinal in admissions. Facility will start authorization today.

## 2024-08-20 NOTE — PLAN OF CARE
Problem: Discharge Planning  Goal: Discharge to home or other facility with appropriate resources  8/20/2024 0137 by Anne-Marie Gonzales RN  Outcome: Progressing  Flowsheets (Taken 8/19/2024 0918 by Ruba Tafoya, RN)  Discharge to home or other facility with appropriate resources:   Identify barriers to discharge with patient and caregiver   Arrange for needed discharge resources and transportation as appropriate   Identify discharge learning needs (meds, wound care, etc)   Refer to discharge planning if patient needs post-hospital services based on physician order or complex needs related to functional status, cognitive ability or social support system  8/19/2024 1541 by Ruba Tafoya RN  Outcome: Progressing  Flowsheets (Taken 8/19/2024 0918)  Discharge to home or other facility with appropriate resources:   Identify barriers to discharge with patient and caregiver   Arrange for needed discharge resources and transportation as appropriate   Identify discharge learning needs (meds, wound care, etc)   Refer to discharge planning if patient needs post-hospital services based on physician order or complex needs related to functional status, cognitive ability or social support system     Problem: Skin/Tissue Integrity  Goal: Absence of new skin breakdown  Description: 1.  Monitor for areas of redness and/or skin breakdown  2.  Assess vascular access sites hourly  3.  Every 4-6 hours minimum:  Change oxygen saturation probe site  4.  Every 4-6 hours:  If on nasal continuous positive airway pressure, respiratory therapy assess nares and determine need for appliance change or resting period.  8/20/2024 0137 by Anne-Marie Gonzales, RN  Outcome: Progressing  Note: Skin kept clean and dry, assessed per protocol.  8/19/2024 1541 by Ruba Tafoya RN  Outcome: Progressing     Problem: Safety - Adult  Goal: Free from fall injury  8/20/2024 0137 by Anne-Marie Gonzales, RN  Outcome: Progressing  Note: Patient instructed on safety  assistive device and precautions (e.g. spinal or hip dislocation precautions)  8/19/2024 1541 by Ruba Tafoya RN  Outcome: Progressing  Goal: Return ADL status to a safe level of function  8/20/2024 0137 by Anne-Marie Gonzales RN  Outcome: Progressing  Flowsheets (Taken 8/20/2024 0137)  Return ADL Status to a Safe Level of Function:   Administer medication as ordered   Assess activities of daily living deficits and provide assistive devices as needed   Obtain physical therapy/occupational therapy consults as needed   Assist and instruct patient to increase activity and self care as tolerated  8/19/2024 1541 by Ruba Tafoya RN  Outcome: Progressing     Problem: Gastrointestinal - Adult  Goal: Maintains or returns to baseline bowel function  8/20/2024 0137 by Anne-Marie Gonzales RN  Outcome: Progressing  Flowsheets (Taken 8/20/2024 0137)  Maintains or returns to baseline bowel function:   Assess bowel function   Administer ordered medications as needed   Nutrition consult to assist patient with appropriate food choices  8/19/2024 1541 by Ruba Tafoya RN  Outcome: Progressing  Goal: Maintains adequate nutritional intake  8/20/2024 0137 by Anne-Marie Gonzales RN  Outcome: Progressing  Flowsheets (Taken 8/20/2024 0137)  Maintains adequate nutritional intake:   Monitor percentage of each meal consumed   Assist with meals as needed   Monitor intake and output, weight and lab values   Obtain nutritional consult as needed  8/19/2024 1541 by Ruba Tafoya RN  Outcome: Progressing     Problem: Infection - Adult  Goal: Absence of infection at discharge  8/20/2024 0137 by Anne-Marie Gonzales RN  Outcome: Progressing  Flowsheets (Taken 8/20/2024 0137)  Absence of infection at discharge:   Monitor lab/diagnostic results   Monitor all insertion sites i.e., indwelling lines, tubes and drains   Administer medications as ordered   Instruct and encourage patient and family to use good hand hygiene technique   Identify and instruct in

## 2024-08-20 NOTE — DISCHARGE INSTR - COC
Continuity of Care Form    Patient Name: Krista Chiang   :  1980  MRN:  [id number]    Admit date:  2024  Discharge date:  24    Code Status Order: Full Code   Advance Directives:   Advance Care Flowsheet Documentation             Admitting Physician:  Ezio Conway MD  PCP: Arielle Melton, APRN - NP    Discharging Nurse: PEBBLES Arredondo  Discharging Hospital Unit/Room#:   Discharging Unit Phone Number: [phone]    Emergency Contact:   Extended Emergency Contact Information  Primary Emergency Contact: Naima Grant  Address: [address] [place] [address]  Home Phone: [phone]  Mobile Phone: [phone]  Relation: Parent  Secondary Emergency Contact: Zachery Grant  Home Phone: [phone]  Mobile Phone: [phone]  Relation: Brother/Sister    Past Surgical History:  Past Surgical History:   Procedure Laterality Date    ABDOMINAL HERNIA REPAIR      incisional hernia repair, complicated by infection, had multiple surgeries for this    ABDOMINAL HERNIA REPAIR  2014    ANKLE FRACTURE SURGERY Left     2023  HIP IRRIGATION AND DEBRIDEMENT AND PLACEMENT OF ANTIBIOTIC IMPREGNATED CEMENT BEADS performed by Dipak Traore DO at [facility] OR    ANKLE FRACTURE SURGERY Left 10/24/2023    IRRIGATION AND DEBRIDEMENT OF LEFT HIP WITH CLOSURE performed by Dipak Traore DO at [facility] OR    ANKLE SURGERY Left 2019    ligament    ANKLE SURGERY Left 2023    IRRIGATION AND DEBRIDEMENT HIP (IRRISEPT, CELLERATE) performed by Dipak Traore DO at [facility] OR    BARIATRIC SURGERY  2013    [facility]     SECTION      CHOLECYSTECTOMY      DILATION AND CURETTAGE OF UTERUS  2005    ESOPHAGOGASTRODUODENOSCOPY  2021    ESOPHAGOGASTRODUODENOSCOPY  2013    FINGER AMPUTATION Left 2015    index and ring finger. from [facility] CATH POWER PICC SINGLE  2023    HIP SURGERY Left   None 08/19/24 1249   Valerie-wound Assessment Excoriated 08/19/24 2055   Margins Defined edges 08/19/24 1249   Number of days: 0       Wound 08/19/24 Hip Left (Active)   Wound Image   08/19/24 1249   Wound Etiology Non-Healing Surgical 08/19/24 2055   Dressing Status Clean;Dry;Intact 08/20/24 0836   Wound Cleansed Soap and water;Irrigated with saline 08/19/24 1249   Dressing/Treatment Negative pressure wound therapy 08/19/24 2055   Wound Length (cm) 7.9 cm 08/19/24 1249   Wound Width (cm) 19 cm 08/19/24 1249   Wound Depth (cm) 8.4 cm 08/19/24 1249   Wound Surface Area (cm^2) 150.1 cm^2 08/19/24 1249   Wound Volume (cm^3) 1260.84 cm^3 08/19/24 1249   Wound Assessment Pink/red;Granulation tissue 08/19/24 1249   Drainage Amount Small (< 25%) 08/19/24 2055   Drainage Description Serosanguinous 08/19/24 2055   Odor Mild 08/19/24 1249   Valerie-wound Assessment Blanchable erythema 08/19/24 2055   Margins Defined edges 08/19/24 1249   Number of days: 0        Elimination:  Continence:   Bowel: No  Bladder: No  Urinary Catheter: None   Colostomy/Ileostomy/Ileal Conduit: No       Date of Last BM: 8/21/24    Intake/Output Summary (Last 24 hours) at 8/20/2024 1140  Last data filed at 8/19/2024 1737  Gross per 24 hour   Intake --   Output 600 ml   Net -600 ml     I/O last 3 completed shifts:  In: -   Out: 1400 [Urine:1400]    Safety Concerns:     At Risk for Falls    Impairments/Disabilities:      None    Nutrition Therapy:  Current Nutrition Therapy:   - Oral Diet:  Carb Control 4 carbs/meal (1800kcals/day)    Routes of Feeding: Oral  Liquids: No Restrictions  Daily Fluid Restriction: no  Last Modified Barium Swallow with Video (Video Swallowing Test): not done    Treatments at the Time of Hospital Discharge:   Respiratory Treatments: n/a  Oxygen Therapy:  is not on home oxygen therapy.  Ventilator:    - No ventilator support    Rehab Therapies: Physical Therapy and Occupational Therapy  Weight Bearing Status/Restrictions: No weight

## 2024-08-21 PROBLEM — R62.7 FAILURE TO THRIVE IN ADULT: Status: ACTIVE | Noted: 2024-08-21

## 2024-08-21 LAB
ANION GAP SERPL CALCULATED.3IONS-SCNC: 9 MMOL/L (ref 9–17)
BUN SERPL-MCNC: 7 MG/DL (ref 6–20)
CALCIUM SERPL-MCNC: 7.7 MG/DL (ref 8.6–10.4)
CHLORIDE SERPL-SCNC: 105 MMOL/L (ref 98–107)
CO2 SERPL-SCNC: 24 MMOL/L (ref 20–31)
CREAT SERPL-MCNC: <0.4 MG/DL (ref 0.5–0.9)
GFR, ESTIMATED: ABNORMAL ML/MIN/1.73M2
GLUCOSE BLD-MCNC: 100 MG/DL (ref 65–105)
GLUCOSE BLD-MCNC: 103 MG/DL (ref 65–105)
GLUCOSE BLD-MCNC: 121 MG/DL (ref 65–105)
GLUCOSE BLD-MCNC: 319 MG/DL (ref 65–105)
GLUCOSE SERPL-MCNC: 108 MG/DL (ref 70–99)
POTASSIUM SERPL-SCNC: 4.6 MMOL/L (ref 3.7–5.3)
SODIUM SERPL-SCNC: 138 MMOL/L (ref 135–144)

## 2024-08-21 PROCEDURE — 82947 ASSAY GLUCOSE BLOOD QUANT: CPT

## 2024-08-21 PROCEDURE — 6370000000 HC RX 637 (ALT 250 FOR IP): Performed by: NURSE PRACTITIONER

## 2024-08-21 PROCEDURE — 80048 BASIC METABOLIC PNL TOTAL CA: CPT

## 2024-08-21 PROCEDURE — 97606 NEG PRS WND THER DME>50 SQCM: CPT

## 2024-08-21 PROCEDURE — 36415 COLL VENOUS BLD VENIPUNCTURE: CPT

## 2024-08-21 PROCEDURE — 1200000000 HC SEMI PRIVATE

## 2024-08-21 PROCEDURE — 6360000002 HC RX W HCPCS: Performed by: INTERNAL MEDICINE

## 2024-08-21 PROCEDURE — 97530 THERAPEUTIC ACTIVITIES: CPT

## 2024-08-21 PROCEDURE — 99232 SBSQ HOSP IP/OBS MODERATE 35: CPT | Performed by: INTERNAL MEDICINE

## 2024-08-21 PROCEDURE — 97110 THERAPEUTIC EXERCISES: CPT

## 2024-08-21 RX ADMIN — GABAPENTIN 300 MG: 300 CAPSULE ORAL at 20:55

## 2024-08-21 RX ADMIN — OXYCODONE HYDROCHLORIDE 10 MG: 10 TABLET ORAL at 00:03

## 2024-08-21 RX ADMIN — MORPHINE SULFATE 2 MG: 2 INJECTION, SOLUTION INTRAMUSCULAR; INTRAVENOUS at 11:41

## 2024-08-21 RX ADMIN — VANCOMYCIN HYDROCHLORIDE 125 MG: 125 CAPSULE ORAL at 16:43

## 2024-08-21 RX ADMIN — PRAVASTATIN SODIUM 40 MG: 40 TABLET ORAL at 20:55

## 2024-08-21 RX ADMIN — MORPHINE SULFATE 2 MG: 2 INJECTION, SOLUTION INTRAMUSCULAR; INTRAVENOUS at 20:51

## 2024-08-21 RX ADMIN — OXYCODONE HYDROCHLORIDE 10 MG: 10 TABLET ORAL at 05:39

## 2024-08-21 RX ADMIN — INSULIN LISPRO 4 UNITS: 100 INJECTION, SOLUTION INTRAVENOUS; SUBCUTANEOUS at 19:31

## 2024-08-21 RX ADMIN — VANCOMYCIN HYDROCHLORIDE 125 MG: 125 CAPSULE ORAL at 00:03

## 2024-08-21 RX ADMIN — DICYCLOMINE HYDROCHLORIDE 10 MG: 10 CAPSULE ORAL at 20:55

## 2024-08-21 RX ADMIN — ACETAMINOPHEN 650 MG: 325 TABLET ORAL at 20:55

## 2024-08-21 RX ADMIN — MORPHINE SULFATE 2 MG: 2 INJECTION, SOLUTION INTRAMUSCULAR; INTRAVENOUS at 07:25

## 2024-08-21 RX ADMIN — DICYCLOMINE HYDROCHLORIDE 10 MG: 10 CAPSULE ORAL at 05:40

## 2024-08-21 RX ADMIN — FONDAPARINUX SODIUM 7.5 MG: 10 INJECTION, SOLUTION SUBCUTANEOUS at 09:16

## 2024-08-21 RX ADMIN — DICYCLOMINE HYDROCHLORIDE 10 MG: 10 CAPSULE ORAL at 09:14

## 2024-08-21 RX ADMIN — BUPROPION HYDROCHLORIDE 150 MG: 150 TABLET, EXTENDED RELEASE ORAL at 09:14

## 2024-08-21 RX ADMIN — OXYCODONE HYDROCHLORIDE 10 MG: 10 TABLET ORAL at 18:05

## 2024-08-21 RX ADMIN — LORAZEPAM 0.5 MG: 0.5 TABLET ORAL at 05:39

## 2024-08-21 RX ADMIN — MORPHINE SULFATE 2 MG: 2 INJECTION, SOLUTION INTRAMUSCULAR; INTRAVENOUS at 16:43

## 2024-08-21 RX ADMIN — MORPHINE SULFATE 2 MG: 2 INJECTION, SOLUTION INTRAMUSCULAR; INTRAVENOUS at 02:30

## 2024-08-21 RX ADMIN — FLUOXETINE HYDROCHLORIDE 40 MG: 20 CAPSULE ORAL at 09:14

## 2024-08-21 RX ADMIN — LORAZEPAM 0.5 MG: 0.5 TABLET ORAL at 13:38

## 2024-08-21 RX ADMIN — GABAPENTIN 300 MG: 300 CAPSULE ORAL at 13:38

## 2024-08-21 RX ADMIN — VANCOMYCIN HYDROCHLORIDE 125 MG: 125 CAPSULE ORAL at 05:40

## 2024-08-21 RX ADMIN — OXYCODONE HYDROCHLORIDE 10 MG: 10 TABLET ORAL at 09:55

## 2024-08-21 RX ADMIN — GABAPENTIN 300 MG: 300 CAPSULE ORAL at 09:14

## 2024-08-21 RX ADMIN — DICYCLOMINE HYDROCHLORIDE 10 MG: 10 CAPSULE ORAL at 16:43

## 2024-08-21 RX ADMIN — VANCOMYCIN HYDROCHLORIDE 125 MG: 125 CAPSULE ORAL at 11:41

## 2024-08-21 RX ADMIN — CHOLESTYRAMINE 4 G: 4 POWDER, FOR SUSPENSION ORAL at 09:14

## 2024-08-21 RX ADMIN — OXYCODONE HYDROCHLORIDE 10 MG: 10 TABLET ORAL at 23:10

## 2024-08-21 RX ADMIN — PANTOPRAZOLE SODIUM 40 MG: 40 TABLET, DELAYED RELEASE ORAL at 05:40

## 2024-08-21 ASSESSMENT — PAIN DESCRIPTION - LOCATION
LOCATION: HIP
LOCATION: HIP
LOCATION: ABDOMEN;HIP
LOCATION: HIP
LOCATION: OTHER (COMMENT)
LOCATION: ABDOMEN;BACK
LOCATION: HIP
LOCATION: HIP

## 2024-08-21 ASSESSMENT — PAIN SCALES - GENERAL
PAINLEVEL_OUTOF10: 2
PAINLEVEL_OUTOF10: 8
PAINLEVEL_OUTOF10: 8
PAINLEVEL_OUTOF10: 3
PAINLEVEL_OUTOF10: 7
PAINLEVEL_OUTOF10: 8
PAINLEVEL_OUTOF10: 7
PAINLEVEL_OUTOF10: 8
PAINLEVEL_OUTOF10: 0
PAINLEVEL_OUTOF10: 2
PAINLEVEL_OUTOF10: 7
PAINLEVEL_OUTOF10: 7
PAINLEVEL_OUTOF10: 9
PAINLEVEL_OUTOF10: 5
PAINLEVEL_OUTOF10: 7
PAINLEVEL_OUTOF10: 3
PAINLEVEL_OUTOF10: 8
PAINLEVEL_OUTOF10: 0

## 2024-08-21 ASSESSMENT — PAIN - FUNCTIONAL ASSESSMENT
PAIN_FUNCTIONAL_ASSESSMENT: PREVENTS OR INTERFERES SOME ACTIVE ACTIVITIES AND ADLS
PAIN_FUNCTIONAL_ASSESSMENT: PREVENTS OR INTERFERES WITH MANY ACTIVE NOT PASSIVE ACTIVITIES
PAIN_FUNCTIONAL_ASSESSMENT: ACTIVITIES ARE NOT PREVENTED
PAIN_FUNCTIONAL_ASSESSMENT: PREVENTS OR INTERFERES SOME ACTIVE ACTIVITIES AND ADLS

## 2024-08-21 ASSESSMENT — PAIN DESCRIPTION - DESCRIPTORS
DESCRIPTORS: POUNDING;PRESSURE
DESCRIPTORS: SHARP
DESCRIPTORS: BURNING;SHARP
DESCRIPTORS: SORE;SHARP
DESCRIPTORS: SORE;SHARP
DESCRIPTORS: SHARP
DESCRIPTORS: SORE
DESCRIPTORS: SORE;PRESSURE
DESCRIPTORS: BURNING;SHARP

## 2024-08-21 ASSESSMENT — PAIN DESCRIPTION - FREQUENCY
FREQUENCY: CONTINUOUS
FREQUENCY: CONTINUOUS

## 2024-08-21 ASSESSMENT — PAIN DESCRIPTION - ONSET
ONSET: ON-GOING
ONSET: ON-GOING

## 2024-08-21 ASSESSMENT — PAIN DESCRIPTION - ORIENTATION
ORIENTATION: LEFT
ORIENTATION: MID
ORIENTATION: LEFT
ORIENTATION: LEFT

## 2024-08-21 ASSESSMENT — PAIN DESCRIPTION - PAIN TYPE: TYPE: ACUTE PAIN;CHRONIC PAIN

## 2024-08-21 NOTE — PROGRESS NOTES
in the rehab facility, her  moved \"a friend\" into their home.  She is unsure if someone took her medication or if it is misplaced.  Home nursing came to the patient's home today to set up care and encouraged her to come to the ED due to concerns for wound infection.  On arrival to the ED, patient's wound was noted to consist of granulating tissue and does not currently appear infected.  See image in media tab.     Patient may need ECF placement.  Social service consulted.  PM&R also consulted for potential admission to ARU.    Review of Systems:     Denies any shortness of breath or cough  Denies chest pain or palpitations  Denies abdominal pain, diarrhea vomiting  Denies any new numbness tremors or weakness.    A 10 point review of systems was performed and and negative except as mentioned in HPI  Positive and Negative as described in HPI.      Past Medical History:     Past Medical History:   Diagnosis Date    Alcohol abuse     Recurrent episodes of withdrawal    Alcoholic hepatitis without ascites     Antiphospholipid syndrome (HCC)     hypercoagulable state    Anxiety 2013    Arthritis 2013    Painting esophagus 07/19/2021    Bipolar 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 encounter     left    Constipation 09/03/2019    Depression 07/12/2015    Fibromyalgia     Genital herpes, unspecified     GERD (gastroesophageal reflux disease) 2021    H/O bariatric surgery 2013    [facility]    History of pulmonary embolism     Hx of blood clots     clots in both legs and lungs     Hypertension 2012    Lives in nursing home 08/26/2023    [facility] :  # [phone] , fax # [phone]    Lumbosacral spondylosis without myelopathy     MDRO (multiple drug resistant organisms) resistance 2010    MRSA (abd)    Mobility impaired     wheelchair, uses a walker and pivots on right foot    MRSA (methicillin resistant staph aureus) culture  positive 02/10/2017    urine    Muscle weakness     Obsessive-compulsive disorder, unspecified     Opioid abuse, in remission ([facility])     Pernicious anemia     Polyneuropathy, unspecified     PTSD (post-traumatic stress disorder)     Pulmonary embolism ([facility])     Suicidal behavior 2015    many attempts    SVT (supraventricular tachycardia) ([facility]) 2017    Type 2 diabetes mellitus, with long-term current use of insulin ([facility])     Under care of team     ID, Dr. Jorge, last seen 10/26/2023 while in hospital    Under care of team     Ortho, Dr. Traore, last seen by resident in ER 10/30/2023        Past Surgical History:     Past Surgical History:   Procedure Laterality Date    ABDOMINAL HERNIA REPAIR      incisional hernia repair, complicated by infection, had multiple surgeries for this    ABDOMINAL HERNIA REPAIR  2014    ANKLE FRACTURE SURGERY Left     2023  HIP IRRIGATION AND DEBRIDEMENT AND PLACEMENT OF ANTIBIOTIC IMPREGNATED CEMENT BEADS performed by Dipak Traore DO at [facility] OR    ANKLE FRACTURE SURGERY Left 10/24/2023    IRRIGATION AND DEBRIDEMENT OF LEFT HIP WITH CLOSURE performed by Dipak Traore DO at [facility] OR    ANKLE SURGERY Left 2019    ligament    ANKLE SURGERY Left 2023    IRRIGATION AND DEBRIDEMENT HIP (IRRISEPT, CELLERATE) performed by Dipak Traore DO at [facility] OR    BARIATRIC SURGERY  2013    [facility] : [facility]     SECTION      CHOLECYSTECTOMY      DILATION AND CURETTAGE OF UTERUS      ESOPHAGOGASTRODUODENOSCOPY  2021    ESOPHAGOGASTRODUODENOSCOPY  2013    FINGER AMPUTATION Left 2015    index and ring finger. from [facility] CATH POWER PICC SINGLE  2023    HIP SURGERY Left     2023 HIP HEMIARTHROPLASTY performed by Dipak Traore DO at [facility] OR    HIP SURGERY Left 2023    DEPUY SPACER HIP EXPLANT, REPLACE DEPUY SPACER HIP (SYNTHES DEPUY EXTRACTION SET, LATERAL ON BEAN  medication  History of DVT PE-continue Arixtra  Recent C. difficile-continue to complete p.o. vancomycin    DVT pplx-patient on Arixtra      8/20  Heart rate blood pressure blood sugars controlled  Wound VAC was re-applied yesterday  No new issues  Pain is better controlled  Awaiting placement    8/21  No new overnight  Patient working with therapy  Pain is better controlled  CBC BMP reviewed and satisfactory  Heart rate blood pressure controlled no fevers  Awaiting placement      Madeline Hutchison MD  8/21/2024  11:06 AM

## 2024-08-21 NOTE — CARE COORDINATION
DISCHARGE PLANNING NOTE:    LSW following for discharge to SNF.  Patient has been accepted by [facility], authorization was submitted on 8/20. Still pending at this time per Kylie in admissions.

## 2024-08-21 NOTE — PROGRESS NOTES
Wound Ostomy Continence Nursing  Follow Up Visit      NAME:  Krista Chiang  MEDICAL RECORD NUMBER:  [id number]  AGE: 44 y.o.   GENDER: female  : 1980  TODAY'S DATE:  2024    Subjective        Wound VAC dressing to the left hip due to be changed today.  The patient was awake and alert.  Requesting premedication from nursing prior to dressing change.    Review of Systems:  Constitutional: negative for chills and fevers  Respiratory: negative for cough and shortness of breath  Cardiovascular: negative for chest pain and palpitations  Gastrointestinal: negative for abdominal pain and nausea  Genitourinary:negative  Integument: Left hip wound  Endocrine:  Type 2 diabetes history  Musculoskeletal: Inability to ambulate due to no left hip function  Behavioral/Psych: negative  Pain: Tolerable with narcotic pain medication negative      Objective     Vitals:  BP (!) 93/54   Pulse 98   Temp 97.3 °F (36.3 °C) (Infrared)   Resp 15   Ht 1.727 m (5' 8\")   Wt 98.4 kg (216 lb 14.9 oz)   SpO2 100%   BMI 32.98 kg/m²    TEMPERATURE:  Current - Temp: 97.3 °F (36.3 °C); Max - Temp  Av.3 °F (36.3 °C)  Min: 97.3 °F (36.3 °C)  Max: 97.3 °F (36.3 °C)    Calixto Risk Score Calixto Scale Score: 15    INTAKE/OUTPUT:      Intake/Output Summary (Last 24 hours) at 2024 1230  Last data filed at 2024 0547  Gross per 24 hour   Intake --   Output 1100 ml   Net -1100 ml                 Physical Exam:  General Appearance: alert and oriented to person, place and time, well-developed and well-nourished, in no acute distress  Head: normocephalic and atraumatic  Pulmonary/Chest: normal air movement, no respiratory distress  Skin: Left hip wound appears to be primarily pink and granular wound bed.  There is an area of greater depth in the center of the wound bed with a cavity.  Exploration of the cavity does not reveal exposed bone.  Wound edges appear to be healthy.   Cardiovascular/Circulation: Minimal edema, no cyanosis,  peripheral pulses  Extremities: no cyanosis and no clubbing  Musculoskeletal: no joint deformity or tenderness, no extremity amputations  Neurologic: gait not evaluated during this visit, coordination normal and speech normal      LABS    CBC:   Lab Results   Component Value Date/Time    WBC 5.3 08/20/2024 06:23 AM    RBC 3.65 08/20/2024 06:23 AM    RBC 4.43 06/03/2012 04:17 AM    HGB 10.0 08/20/2024 06:23 AM    HCT 37.1 08/20/2024 06:23 AM     SED RATE:   Lab Results   Component Value Date    SEDRATE 36 (H) 12/22/2023    SEDRATE 34 (H) 12/22/2023     CRP:   Lab Results   Component Value Date/Time    CRP 20.1 12/24/2023 10:27 PM    CRP 27.7 12/24/2023 06:46 AM     Prealbumin:   Lab Results   Component Value Date/Time    PREALBUMIN 11.9 11/26/2023 05:14 PM       PT/INR:    Lab Results   Component Value Date/Time    PROTIME 13.5 12/23/2023 07:21 AM    PROTIME 15.3 12/20/2013 07:45 AM    INR 1.1 12/23/2023 07:21 AM     HgBA1c:    Lab Results   Component Value Date/Time    LABA1C 6.5 11/08/2023 05:13 AM    LABA1C 7.7 07/06/2023 02:11 AM       Flowsheet Documentation    Negative Pressure Wound Therapy Hip Left (Active)   $ Standard NPWT >50 sq cm PER TX $ Yes 08/21/24 1228   Wound Type Surgical 08/21/24 1228   Unit Type KCI VAC Ulta 08/21/24 0940   Dressing Type Black Foam 08/21/24 1228   Number of pieces used 3 08/21/24 1228   Cycle Continuous;On 08/21/24 1228   Target Pressure (mmHg) 150 08/21/24 1228   Canister changed? Yes 08/19/24 1249   Dressing Status Clean;Dry;Intact 08/21/24 0940   Dressing Changed Changed/New 08/19/24 1249   Number of days: 1       Wound 08/19/24 Thigh Left;Anterior (Active)   Wound Image   08/19/24 1249   Wound Etiology Non-Healing Surgical 08/21/24 0940   Dressing Status Clean;Dry;Intact 08/21/24 0940   Wound Cleansed Cleansed with saline 08/19/24 1249   Dressing/Treatment Foam 08/21/24 0940   Wound Length (cm) 0.8 cm 08/19/24 1249   Wound Width (cm) 2.6 cm 08/19/24 1249   Wound Depth (cm)  facility.  May also use saline moistened gauze dressing changed twice daily to manage wound VAC failures.

## 2024-08-21 NOTE — PLAN OF CARE
Problem: Discharge Planning  Goal: Discharge to home or other facility with appropriate resources  Outcome: Progressing  Flowsheets (Taken 8/21/2024 0053)  Discharge to home or other facility with appropriate resources: Identify barriers to discharge with patient and caregiver     Problem: Skin/Tissue Integrity  Goal: Absence of new skin breakdown  Description: 1.  Monitor for areas of redness and/or skin breakdown  2.  Assess vascular access sites hourly  3.  Every 4-6 hours minimum:  Change oxygen saturation probe site  4.  Every 4-6 hours:  If on nasal continuous positive airway pressure, respiratory therapy assess nares and determine need for appliance change or resting period.  Outcome: Progressing  Note: Skin kept clean and dry, assessed per protocol, wound vac per orders, zinc cream to excoriated skin.     Problem: Safety - Adult  Goal: Free from fall injury  Outcome: Progressing  Note: Skin kept clean and dry, assessed per protocol.     Problem: Pain  Goal: Verbalizes/displays adequate comfort level or baseline comfort level  Outcome: Progressing  Flowsheets (Taken 8/21/2024 0053)  Verbalizes/displays adequate comfort level or baseline comfort level:   Encourage patient to monitor pain and request assistance   Assess pain using appropriate pain scale   Administer analgesics based on type and severity of pain and evaluate response   Implement non-pharmacological measures as appropriate and evaluate response     Problem: Skin/Tissue Integrity - Adult  Goal: Incisions, wounds, or drain sites healing without S/S of infection  Outcome: Progressing  Flowsheets (Taken 8/21/2024 0053)  Incisions, Wounds, or Drain Sites Healing Without Sign and Symptoms of Infection:   TWICE DAILY: Assess and document dressing/incision, wound bed, drain sites and surrounding tissue   Implement wound care per orders     Problem: Musculoskeletal - Adult  Goal: Return mobility to safest level of function  Outcome:  medications as ordered   Instruct and encourage patient and family to use good hand hygiene technique   Identify and instruct in appropriate isolation precautions for identified infection/condition     Problem: Metabolic/Fluid and Electrolytes - Adult  Goal: Electrolytes maintained within normal limits  Outcome: Progressing  Flowsheets (Taken 8/20/2024 0137)  Electrolytes maintained within normal limits: Monitor labs and assess patient for signs and symptoms of electrolyte imbalances  Goal: Glucose maintained within prescribed range  Outcome: Progressing  Flowsheets (Taken 8/20/2024 0137)  Glucose maintained within prescribed range:   Monitor blood glucose as ordered   Assess for signs and symptoms of hyperglycemia and hypoglycemia     Problem: Chronic Conditions and Co-morbidities  Goal: Patient's chronic conditions and co-morbidity symptoms are monitored and maintained or improved  Outcome: Progressing  Flowsheets (Taken 8/20/2024 0137)  Care Plan - Patient's Chronic Conditions and Co-Morbidity Symptoms are Monitored and Maintained or Improved: Monitor and assess patient's chronic conditions and comorbid symptoms for stability, deterioration, or improvement     Problem: Nutrition Deficit:  Goal: Optimize nutritional status  Outcome: Progressing  Flowsheets (Taken 8/20/2024 0137)  Nutrient intake appropriate for improving, restoring, or maintaining nutritional needs:   Assess nutritional status and recommend course of action   Monitor oral intake, labs, and treatment plans

## 2024-08-21 NOTE — PROGRESS NOTES
Physical Therapy  Facility/Department: [facility] MED SURG  Daily Treatment Note  NAME: Krista Chiang  : 1980  MRN: [id number]    Date of Service: 2024    Discharge Recommendations:  Patient would benefit from continued therapy after discharge, Continue to assess pending progress   PT Equipment Recommendations  Equipment Needed:  (TBD)    Patient Diagnosis(es): The primary encounter diagnosis was Failure to thrive in adult. Diagnoses of Infection of prosthetic hip joint, subsequent encounter and Intractable pain were also pertinent to this visit.    Assessment   Equipment Needed:  (TBD)     Plan    Physical Therapy Plan  General Plan:  (5-7x per week)  PT Plan of Care:  (5-7x per week)     Restrictions  Restrictions/Precautions  Restrictions/Precautions: Contact Precautions, Fall Risk, Up as Tolerated  Required Braces or Orthoses?: No (denies)  Implants present? :  (IVC filter)  Position Activity Restriction  Other position/activity restrictions: As tolerated     Subjective    Subjective  Subjective: Pt positioned in supine, expressing concerns with L LE pain. pt agreeable to tx. RN ross vega's tx and confirms pt is not on bed rest prior to tx.  Pain: L LE 8/10 hip, thigh, shin. Increasing to 9/10 with EOB sitting  Orientation  Overall Orientation Status: Within Functional Limits  Orientation Level: Oriented X4  Cognition  Overall Cognitive Status: WFL     Objective   Bed Mobility Training  Bed Mobility Training: Yes (HOB flat, use of bed rails)  Rolling: Modified independent (to R side, uses bed rails)  Supine to Sit: Minimum assistance (for L LE)  Sit to Supine: Minimum assistance (for L LE)  Scooting: Stand-by assistance (to EOB)  Balance  Sitting: Intact  Transfer Training  Transfer Training: No (Pt limited by high pain levels)  Gait Training  Gait Training: No     PT Exercises  A/AROM Exercises: Seated EOB B LE ex's x10. R LE with AAROM, L LE AROM.  Dynamic Sitting Balance Exercises: Seated EOB performing

## 2024-08-21 NOTE — PLAN OF CARE
Problem: Discharge Planning  Goal: Discharge to home or other facility with appropriate resources  8/21/2024 1445 by Ruba Tafoya RN  Outcome: Progressing  Flowsheets (Taken 8/21/2024 0940)  Discharge to home or other facility with appropriate resources:   Identify barriers to discharge with patient and caregiver   Arrange for needed discharge resources and transportation as appropriate   Identify discharge learning needs (meds, wound care, etc)   Refer to discharge planning if patient needs post-hospital services based on physician order or complex needs related to functional status, cognitive ability or social support system  8/21/2024 0053 by Anne-Marie Gonzales RN  Outcome: Progressing  Flowsheets (Taken 8/21/2024 0053)  Discharge to home or other facility with appropriate resources: Identify barriers to discharge with patient and caregiver     Problem: Skin/Tissue Integrity  Goal: Absence of new skin breakdown  Description: 1.  Monitor for areas of redness and/or skin breakdown  2.  Assess vascular access sites hourly  3.  Every 4-6 hours minimum:  Change oxygen saturation probe site  4.  Every 4-6 hours:  If on nasal continuous positive airway pressure, respiratory therapy assess nares and determine need for appliance change or resting period.  8/21/2024 1445 by Ruba Tafoya RN  Outcome: Progressing  8/21/2024 0053 by Anne-Marie Gonzales RN  Outcome: Progressing  Note: Skin kept clean and dry, assessed per protocol, wound vac per orders, zinc cream to excoriated skin.     Problem: Safety - Adult  Goal: Free from fall injury  8/21/2024 1445 by Ruba Tafoya, RN  Outcome: Progressing  8/21/2024 0053 by Anne-Marie Gonzales RN  Outcome: Progressing  Note: Skin kept clean and dry, assessed per protocol.     Problem: Pain  Goal: Verbalizes/displays adequate comfort level or baseline comfort level  8/21/2024 1445 by Ruba Tafoya, RN  Outcome: Progressing  8/21/2024 0053 by Anne-Marie Gonzales RN  Outcome:

## 2024-08-22 ENCOUNTER — FOLLOWUP TELEPHONE ENCOUNTER (OUTPATIENT)
Dept: WOUND CARE | Age: 44
End: 2024-08-22

## 2024-08-22 ENCOUNTER — HOSPITAL ENCOUNTER (OUTPATIENT)
Dept: WOUND CARE | Age: 44
Discharge: HOME | End: 2024-08-22

## 2024-08-22 VITALS
DIASTOLIC BLOOD PRESSURE: 71 MMHG | BODY MASS INDEX: 32.88 KG/M2 | TEMPERATURE: 97.9 F | OXYGEN SATURATION: 97 % | RESPIRATION RATE: 18 BRPM | WEIGHT: 216.93 LBS | HEIGHT: 68 IN | HEART RATE: 79 BPM | SYSTOLIC BLOOD PRESSURE: 125 MMHG

## 2024-08-22 LAB
BASOPHILS # BLD: 0.1 K/UL (ref 0–0.2)
BASOPHILS NFR BLD: 1 % (ref 0–2)
EOSINOPHIL # BLD: 0.2 K/UL (ref 0–0.4)
EOSINOPHILS RELATIVE PERCENT: 4 % (ref 0–4)
ERYTHROCYTE [DISTWIDTH] IN BLOOD BY AUTOMATED COUNT: 17.3 % (ref 11.5–14.9)
GLUCOSE BLD-MCNC: 180 MG/DL (ref 65–105)
GLUCOSE BLD-MCNC: 185 MG/DL (ref 65–105)
GLUCOSE BLD-MCNC: 202 MG/DL (ref 65–105)
HCT VFR BLD AUTO: 33.6 % (ref 36–46)
HGB BLD-MCNC: 10.5 G/DL (ref 12–16)
LYMPHOCYTES NFR BLD: 1.3 K/UL (ref 1–4.8)
LYMPHOCYTES RELATIVE PERCENT: 25 % (ref 24–44)
MCH RBC QN AUTO: 27.5 PG (ref 26–34)
MCHC RBC AUTO-ENTMCNC: 31.3 G/DL (ref 31–37)
MCV RBC AUTO: 87.9 FL (ref 80–100)
MONOCYTES NFR BLD: 0.5 K/UL (ref 0.1–1.3)
MONOCYTES NFR BLD: 9 % (ref 1–7)
NEUTROPHILS NFR BLD: 61 % (ref 36–66)
NEUTS SEG NFR BLD: 3.1 K/UL (ref 1.3–9.1)
PLATELET # BLD AUTO: 329 K/UL (ref 150–450)
PMV BLD AUTO: 10.8 FL (ref 6–12)
RBC # BLD AUTO: 3.82 M/UL (ref 4–5.2)
WBC OTHER # BLD: 5.1 K/UL (ref 3.5–11)

## 2024-08-22 PROCEDURE — 6360000002 HC RX W HCPCS: Performed by: INTERNAL MEDICINE

## 2024-08-22 PROCEDURE — 99239 HOSP IP/OBS DSCHRG MGMT >30: CPT | Performed by: INTERNAL MEDICINE

## 2024-08-22 PROCEDURE — 6370000000 HC RX 637 (ALT 250 FOR IP): Performed by: NURSE PRACTITIONER

## 2024-08-22 PROCEDURE — 82947 ASSAY GLUCOSE BLOOD QUANT: CPT

## 2024-08-22 PROCEDURE — 36415 COLL VENOUS BLD VENIPUNCTURE: CPT

## 2024-08-22 PROCEDURE — 2580000003 HC RX 258: Performed by: NURSE PRACTITIONER

## 2024-08-22 PROCEDURE — 85025 COMPLETE CBC W/AUTO DIFF WBC: CPT

## 2024-08-22 RX ADMIN — OXYCODONE HYDROCHLORIDE 10 MG: 10 TABLET ORAL at 03:37

## 2024-08-22 RX ADMIN — DICYCLOMINE HYDROCHLORIDE 10 MG: 10 CAPSULE ORAL at 06:20

## 2024-08-22 RX ADMIN — MORPHINE SULFATE 2 MG: 2 INJECTION, SOLUTION INTRAMUSCULAR; INTRAVENOUS at 00:21

## 2024-08-22 RX ADMIN — VANCOMYCIN HYDROCHLORIDE 125 MG: 125 CAPSULE ORAL at 00:21

## 2024-08-22 RX ADMIN — GABAPENTIN 300 MG: 300 CAPSULE ORAL at 10:23

## 2024-08-22 RX ADMIN — FONDAPARINUX SODIUM 7.5 MG: 10 INJECTION, SOLUTION SUBCUTANEOUS at 10:50

## 2024-08-22 RX ADMIN — VANCOMYCIN HYDROCHLORIDE 125 MG: 125 CAPSULE ORAL at 10:51

## 2024-08-22 RX ADMIN — MORPHINE SULFATE 2 MG: 2 INJECTION, SOLUTION INTRAMUSCULAR; INTRAVENOUS at 04:50

## 2024-08-22 RX ADMIN — SODIUM CHLORIDE, PRESERVATIVE FREE 10 ML: 5 INJECTION INTRAVENOUS at 09:21

## 2024-08-22 RX ADMIN — DICYCLOMINE HYDROCHLORIDE 10 MG: 10 CAPSULE ORAL at 10:23

## 2024-08-22 RX ADMIN — BUPROPION HYDROCHLORIDE 150 MG: 150 TABLET, EXTENDED RELEASE ORAL at 10:23

## 2024-08-22 RX ADMIN — FLUOXETINE HYDROCHLORIDE 40 MG: 20 CAPSULE ORAL at 10:23

## 2024-08-22 RX ADMIN — VANCOMYCIN HYDROCHLORIDE 125 MG: 125 CAPSULE ORAL at 06:20

## 2024-08-22 RX ADMIN — PANTOPRAZOLE SODIUM 40 MG: 40 TABLET, DELAYED RELEASE ORAL at 06:20

## 2024-08-22 RX ADMIN — OXYCODONE HYDROCHLORIDE 10 MG: 10 TABLET ORAL at 10:26

## 2024-08-22 RX ADMIN — DILTIAZEM HYDROCHLORIDE 120 MG: 120 CAPSULE, COATED, EXTENDED RELEASE ORAL at 10:26

## 2024-08-22 RX ADMIN — MORPHINE SULFATE 2 MG: 2 INJECTION, SOLUTION INTRAMUSCULAR; INTRAVENOUS at 09:21

## 2024-08-22 RX ADMIN — CHOLESTYRAMINE 4 G: 4 POWDER, FOR SUSPENSION ORAL at 10:50

## 2024-08-22 ASSESSMENT — PAIN DESCRIPTION - ORIENTATION
ORIENTATION: LEFT

## 2024-08-22 ASSESSMENT — PAIN - FUNCTIONAL ASSESSMENT
PAIN_FUNCTIONAL_ASSESSMENT: PREVENTS OR INTERFERES SOME ACTIVE ACTIVITIES AND ADLS
PAIN_FUNCTIONAL_ASSESSMENT: PREVENTS OR INTERFERES WITH MANY ACTIVE NOT PASSIVE ACTIVITIES
PAIN_FUNCTIONAL_ASSESSMENT: PREVENTS OR INTERFERES SOME ACTIVE ACTIVITIES AND ADLS
PAIN_FUNCTIONAL_ASSESSMENT: PREVENTS OR INTERFERES SOME ACTIVE ACTIVITIES AND ADLS

## 2024-08-22 ASSESSMENT — PAIN SCALES - GENERAL
PAINLEVEL_OUTOF10: 8
PAINLEVEL_OUTOF10: 6
PAINLEVEL_OUTOF10: 7
PAINLEVEL_OUTOF10: 8

## 2024-08-22 ASSESSMENT — PAIN DESCRIPTION - LOCATION
LOCATION: HIP;LEG
LOCATION: HIP
LOCATION: HIP
LOCATION: HIP;LEG
LOCATION: HIP;LEG

## 2024-08-22 ASSESSMENT — PAIN DESCRIPTION - DESCRIPTORS
DESCRIPTORS: SHARP;SORE
DESCRIPTORS: SHARP
DESCRIPTORS: SHARP;SORE
DESCRIPTORS: ACHING;SHARP
DESCRIPTORS: SHARP;SORE

## 2024-08-22 NOTE — PLAN OF CARE
Problem: Skin/Tissue Integrity  Goal: Absence of new skin breakdown  Description: 1.  Monitor for areas of redness and/or skin breakdown  2.  Assess vascular access sites hourly  3.  Every 4-6 hours minimum:  Change oxygen saturation probe site  4.  Every 4-6 hours:  If on nasal continuous positive airway pressure, respiratory therapy assess nares and determine need for appliance change or resting period.  8/22/2024 1346 by Maria Elena Ocasio RN  Outcome: Progressing  Note: No new occurrence of skin breakdown noted during this shift.     Problem: Safety - Adult  Goal: Free from fall injury  8/22/2024 1346 by Maria Elena Ocasio RN  Outcome: Progressing  Note: Patient remains free of incidence/ injury. Bed remains in low position. Side rails up x2.       Problem: Pain  Goal: Verbalizes/displays adequate comfort level or baseline comfort level  8/22/2024 1346 by Maria Elena Ocasio RN  Outcome: Progressing  Note: Patient remains free of incidence/ injury. Bed remains in low position. Side rails up x2.       Problem: Infection - Adult  Goal: Absence of infection at discharge  8/22/2024 1346 by Maria Elena Ocasio RN  Outcome: Progressing  Note: Patient displays no new signs/ symptoms of infection during this shift.

## 2024-08-22 NOTE — PLAN OF CARE
Problem: Discharge Planning  Goal: Discharge to home or other facility with appropriate resources  8/22/2024 1348 by Maria Elena Ocasio RN  Outcome: Adequate for Discharge  8/22/2024 0541 by Oppenheim, Evan, RN  Outcome: Progressing  Flowsheets (Taken 8/21/2024 2030)  Discharge to home or other facility with appropriate resources: Arrange for needed discharge resources and transportation as appropriate     Problem: Skin/Tissue Integrity  Goal: Absence of new skin breakdown  Description: 1.  Monitor for areas of redness and/or skin breakdown  2.  Assess vascular access sites hourly  3.  Every 4-6 hours minimum:  Change oxygen saturation probe site  4.  Every 4-6 hours:  If on nasal continuous positive airway pressure, respiratory therapy assess nares and determine need for appliance change or resting period.  8/22/2024 1348 by Maria Elena Ocasio RN  Outcome: Adequate for Discharge  8/22/2024 1346 by Maria Elena Ocasio RN  Outcome: Progressing  Note: No new occurrence of skin breakdown noted during this shift.  8/22/2024 0541 by Oppenheim, Evan, RN  Outcome: Progressing     Problem: Safety - Adult  Goal: Free from fall injury  8/22/2024 1348 by Maria Elena Ocasio RN  Outcome: Adequate for Discharge  8/22/2024 1346 by Maria Elena Ocasio RN  Outcome: Progressing  Note: Patient remains free of incidence/ injury. Bed remains in low position. Side rails up x2.    8/22/2024 0541 by Oppenheim, Evan, RN  Outcome: Progressing     Problem: Pain  Goal: Verbalizes/displays adequate comfort level or baseline comfort level  8/22/2024 1348 by Maria Elena Ocasio RN  Outcome: Adequate for Discharge  8/22/2024 1346 by Maria Elena Ocasio RN  Outcome: Progressing  Note: Patient remains free of incidence/ injury. Bed remains in low position. Side rails up x2.    8/22/2024 0541 by Oppenheim, Evan, RN  Outcome: Progressing     Problem: Skin/Tissue Integrity - Adult  Goal: Incisions, wounds, or drain sites healing without S/S  of infection  8/22/2024 1348 by Maria Elena Ocasio RN  Outcome: Adequate for Discharge  8/22/2024 0541 by Oppenheim, Evan, RN  Outcome: Progressing  Flowsheets (Taken 8/21/2024 2030)  Incisions, Wounds, or Drain Sites Healing Without Sign and Symptoms of Infection: ADMISSION and DAILY: Assess and document risk factors for pressure ulcer development     Problem: Musculoskeletal - Adult  Goal: Return mobility to safest level of function  8/22/2024 1348 by Maria Elena Ocasio RN  Outcome: Adequate for Discharge  8/22/2024 0541 by Oppenheim, Evan, RN  Outcome: Progressing  Flowsheets (Taken 8/21/2024 2030)  Return Mobility to Safest Level of Function: Assess patient stability and activity tolerance for standing, transferring and ambulating with or without assistive devices  Goal: Maintain proper alignment of affected body part  8/22/2024 1348 by Maria Elena Ocasio RN  Outcome: Adequate for Discharge  8/22/2024 0541 by Oppenheim, Evan, RN  Outcome: Progressing  Flowsheets (Taken 8/21/2024 2030)  Maintain proper alignment of affected body part: Support and protect limb and body alignment per provider's orders  Goal: Return ADL status to a safe level of function  8/22/2024 1348 by Maria Elena Ocasio RN  Outcome: Adequate for Discharge  8/22/2024 0541 by Oppenheim, Evan, RN  Outcome: Progressing  Flowsheets (Taken 8/21/2024 2030)  Return ADL Status to a Safe Level of Function: Administer medication as ordered     Problem: Gastrointestinal - Adult  Goal: Maintains or returns to baseline bowel function  8/22/2024 1348 by Maria Elena Ocasio RN  Outcome: Adequate for Discharge  8/22/2024 0541 by Oppenheim, Evan, RN  Outcome: Progressing  Flowsheets (Taken 8/21/2024 2030)  Maintains or returns to baseline bowel function: Assess bowel function  Goal: Maintains adequate nutritional intake  8/22/2024 1348 by Maria Elena Ocasio RN  Outcome: Adequate for Discharge  8/22/2024 0541 by Oppenheim, Evan, RN  Outcome:  Progressing  Flowsheets (Taken 8/21/2024 2030)  Maintains adequate nutritional intake: Monitor percentage of each meal consumed     Problem: Infection - Adult  Goal: Absence of infection at discharge  8/22/2024 1348 by Maria Elena Ocasio RN  Outcome: Adequate for Discharge  8/22/2024 1346 by Maria Elena Ocasio RN  Outcome: Progressing  Note: Patient displays no new signs/ symptoms of infection during this shift.    8/22/2024 0541 by Oppenheim, Evan, RN  Outcome: Progressing  Flowsheets (Taken 8/21/2024 2030)  Absence of infection at discharge: Monitor lab/diagnostic results     Problem: Metabolic/Fluid and Electrolytes - Adult  Goal: Electrolytes maintained within normal limits  8/22/2024 1348 by Maria Elena Ocasio RN  Outcome: Adequate for Discharge  8/22/2024 0541 by Oppenheim, Evan, RN  Outcome: Progressing  Flowsheets (Taken 8/21/2024 2030)  Electrolytes maintained within normal limits: Administer electrolyte replacement as ordered  Goal: Glucose maintained within prescribed range  8/22/2024 1348 by Maria Elena Ocasio RN  Outcome: Adequate for Discharge  8/22/2024 0541 by Oppenheim, Evan, RN  Outcome: Progressing  Flowsheets (Taken 8/21/2024 2030)  Glucose maintained within prescribed range: Monitor blood glucose as ordered     Problem: Chronic Conditions and Co-morbidities  Goal: Patient's chronic conditions and co-morbidity symptoms are monitored and maintained or improved  8/22/2024 1348 by Maria Elena Ocasio RN  Outcome: Adequate for Discharge  8/22/2024 0541 by Oppenheim, Evan, RN  Outcome: Progressing  Flowsheets (Taken 8/21/2024 2030)  Care Plan - Patient's Chronic Conditions and Co-Morbidity Symptoms are Monitored and Maintained or Improved: Monitor and assess patient's chronic conditions and comorbid symptoms for stability, deterioration, or improvement     Problem: Nutrition Deficit:  Goal: Optimize nutritional status  8/22/2024 1348 by Maria Elena Ocasio RN  Outcome: Adequate for

## 2024-08-22 NOTE — PLAN OF CARE
Problem: Discharge Planning  Goal: Discharge to home or other facility with appropriate resources  Outcome: Progressing  Flowsheets (Taken 8/21/2024 2030)  Discharge to home or other facility with appropriate resources: Arrange for needed discharge resources and transportation as appropriate     Problem: Skin/Tissue Integrity  Goal: Absence of new skin breakdown  Description: 1.  Monitor for areas of redness and/or skin breakdown  2.  Assess vascular access sites hourly  3.  Every 4-6 hours minimum:  Change oxygen saturation probe site  4.  Every 4-6 hours:  If on nasal continuous positive airway pressure, respiratory therapy assess nares and determine need for appliance change or resting period.  Outcome: Progressing     Problem: Safety - Adult  Goal: Free from fall injury  Outcome: Progressing     Problem: Pain  Goal: Verbalizes/displays adequate comfort level or baseline comfort level  Outcome: Progressing     Problem: Skin/Tissue Integrity - Adult  Goal: Incisions, wounds, or drain sites healing without S/S of infection  Outcome: Progressing  Flowsheets (Taken 8/21/2024 2030)  Incisions, Wounds, or Drain Sites Healing Without Sign and Symptoms of Infection: ADMISSION and DAILY: Assess and document risk factors for pressure ulcer development     Problem: Musculoskeletal - Adult  Goal: Return mobility to safest level of function  Outcome: Progressing  Flowsheets (Taken 8/21/2024 2030)  Return Mobility to Safest Level of Function: Assess patient stability and activity tolerance for standing, transferring and ambulating with or without assistive devices  Goal: Maintain proper alignment of affected body part  Outcome: Progressing  Flowsheets (Taken 8/21/2024 2030)  Maintain proper alignment of affected body part: Support and protect limb and body alignment per provider's orders  Goal: Return ADL status to a safe level of function  Outcome: Progressing  Flowsheets (Taken 8/21/2024 2030)  Return ADL Status to a Safe

## 2024-08-22 NOTE — PROGRESS NOTES
Physical Therapy        Physical Therapy Cancel Note      DATE: 2024    NAME: Krista Chiang  MRN: [id number]   : 1980      Patient not seen this date for Physical Therapy due to:    2024 at 1322-  Pt being discharged to [facility] at 1:00 p.m.      Electronically signed by Brandy Hendrix PT on 2024 at 1:22 PM

## 2024-08-22 NOTE — DISCHARGE SUMMARY
IN-PATIENT SERVICE   [facility]    Discharge Summary     Patient ID: Krista Chiang  :  1980   MRN: [id number]     ACCOUNT:  [id number]   Patient's PCP: Arielle Melton APRN - NP  Admit Date: 2024   Discharge Date: 24   Length of Stay: 3  Code Status:  Full Code  Admitting Physician: Ezio Conway MD  Discharge Physician: Madeline Hutchison MD     Active Discharge Diagnoses:     Primary Problem  Intractable pain      Hospital Problems  Active Hospital Problems    Diagnosis Date Noted    Failure to thrive in adult [R62.7] 2024    Mild malnutrition (HCC) [E44.1] 2024    Intractable pain [R52] 2024    Nonhealing surgical wound, subsequent encounter [T81.89XD] 2024    Type 2 diabetes mellitus with diabetic polyneuropathy, with long-term current use of insulin (HCC) [E11.42, Z79.4] 2015       Admission Condition:  fair     Discharged Condition: fair    Hospital Stay:     Hospital Course:  Krista Chiang is a 44 y.o. female who was admitted for the management of Intractable pain , presented to ER with Wound Check    44-year-old female history of chronic osteomyelitis left femur, T2DM, HTN, frequent falls, history of drug and alcohol abuse complicated open hip wound history of MRSA infection, gastric bypass history of PE/DVT, hypercoagulable state secondary to antiphospholipid syndrome, recently discharged from rehab to home on 8/15, now presenting from home with recurrent falls and severe pain due to inability to locate her pain medication     Recurrent falls continued pain-PT OT consult, PMNR consult may need ECF placement social work consulted.   Chronic left hip wound-wound care consulted, wound VAC was reapplied  Pain management-resume Neurontin,  oxycodone 10 mg Q4 as needed.  Right knee and left femur x-rays from  noted-nil acute  Hypokalemia-replace per protocol  T2DM-controlled sliding scale  HTN-currently controlled  TECHNOLOGIST PROVIDED HISTORY: Fall; previous history of hip arthroplasty followed by removal of prosthetic 2/2 osteomyelitis Reason for Exam: Pt fell 4 times today. States right knee pain. Surgery on left femur 3 weeks ago - hip arthroplasty followed by removal of prosthetic. Pt complains of severe pain in left femur FINDINGS: Status post left hip arthroplasty explantation.  Cerclage wires are again seen about the proximal femur.  A chronic nondisplaced at least partially ununited fracture of the proximal femoral diaphysis is again seen between the 2 most distal cerclage wires.  No other fracture identified.  Severe degenerative changes of the left knee.  Cortical irregularity of the proximal femur.  Irregularity also seen in the left acetabulum.  A lateral wound VAC is in place.  No definite soft tissue gas.     1. Status post left hip arthroplasty explantation. 2. Chronic nondisplaced at least partially ununited fracture of the proximal femoral diaphysis between the 2 most distal cerclage wires. 3. Cortical irregularity of the proximal femur and left acetabulum which could be related to the patient's known osteomyelitis.     CT abdomen pelvis W contrast    Result Date: 8/13/2024  EXAM: CT ABDOMEN PELVIS W CONTRAST REASON FOR EXAM: Female, 44 years, c. diff with cyclic n/v, evaluate for worsening colitis or toxic megacolon. TECHNIQUE: Computed tomography of the abdomen and pelvis is performed in the axial projection from the lung bases to the pubic symphysis. Sagittal and coronal reconstructed images are performed. Dose reduction techniques were achieved by using automated exposure control and/or adjustment of mA and/or KVP according to patient size and/or use of iterative reconstruction technique. Images were performed following intravenous contrast administration. COMPARISON: Plain abdominal films, same date FINDINGS: Lung bases: There is minimal linear atelectasis at the lung bases. There is a tiny left pleural

## 2024-08-22 NOTE — CARE COORDINATION
DISCHARGE PLANNING NOTE:    LSW following for discharge to SNF.  Patient has been accepted by [facility], authorization was submitted on 8/20. Approved per Kylie in admissions, patient can admit to facility when medically ready for discharge.

## 2024-08-22 NOTE — CARE COORDINATION
Transportation arranged for patient to go to [facility] at [facility] via [facility]. Facility informed. Bedside nurse informed.     Number for Report: [phone]

## 2024-08-23 ENCOUNTER — HOSPITAL ENCOUNTER (OUTPATIENT)
Age: 44
Setting detail: SPECIMEN
Discharge: HOME | End: 2024-08-23

## 2024-08-24 NOTE — DISCHARGE INSTRUCTIONS
[facility] WOUND and HYPERBARIC TREATMENT  CENTER      Visit  Discharge Instructions / Physician Orders  DATE:8/26/24     Home Care:     SUPPLIES ORDERED THRU:                     DATE LAST SUPPLIED     Wound Location: Left Hip      Cleanse with: Liquid antibacterial soap and water, rinse well      Dressing Orders:  Primary dressing                       Secondary dressing                           secure with           x 30days     Frequency:       Additional Orders: Increase protein to diet (meat, cheese, eggs, fish, peanut butter, nuts and beans)  Multivitamin daily    OFFLOADING [] YES  TYPE:                  [] NA    Weekly wound care visits until determined otherwise.    Antibiotic therapy-wound care related YES [] NO [] NA[]    MY CHART []     Smart Device  []     HYPERBARIC TREATMENT-                TREATMENT #                          Your next appointment with the Wound Care Center is in 1 week                                                                                                   (Please note your next appointment above and if you are unable to keep, kindly give a 24 hour notice. Thank you.)  If more than 15 min late we cannot guarantee you will be seen due to clinician schedule  Per Policy, Excessive cancellation will call for dismissal from program.  If you experience any of the following, please call the Wound Care Center during business hours:  [phone]     * Increase in Pain  * Temperature over 101  * Increase in drainage from your wound  * Drainage with a foul odor  * Bleeding  * Increase in swelling  * Need for compression bandage changes due to slippage, breakthrough drainage.     If you need medical attention outside of the business hours of the Wound Care Centers please contact your PCP or go to the nearest emergency room.     The information contained in the After Visit Summary has been reviewed with me, the patient and/or responsible adult, by my health care provider(s). I had

## 2024-08-26 ENCOUNTER — HOSPITAL ENCOUNTER (OUTPATIENT)
Dept: WOUND CARE | Age: 44
Discharge: HOME | End: 2024-08-26

## 2024-08-26 ENCOUNTER — HOSPITAL ENCOUNTER (OUTPATIENT)
Age: 44
Setting detail: SPECIMEN
Discharge: HOME | End: 2024-08-26
Payer: COMMERCIAL

## 2024-08-26 ENCOUNTER — TELEPHONE (OUTPATIENT)
Dept: WOUND CARE | Age: 44
End: 2024-08-26

## 2024-08-26 LAB
ALBUMIN SERPL-MCNC: 2.3 G/DL (ref 3.5–5.2)
ALBUMIN/GLOB SERPL: 1 {RATIO} (ref 1–2.5)
ALP SERPL-CCNC: 271 U/L (ref 35–104)
ALT SERPL-CCNC: 52 U/L (ref 10–35)
AMMONIA PLAS-SCNC: 21 UMOL/L (ref 11–51)
ANION GAP SERPL CALCULATED.3IONS-SCNC: 6 MMOL/L (ref 9–16)
AST SERPL-CCNC: 76 U/L (ref 10–35)
BILIRUB SERPL-MCNC: 0.2 MG/DL (ref 0–1.2)
BUN SERPL-MCNC: 7 MG/DL (ref 6–20)
CALCIUM SERPL-MCNC: 8.1 MG/DL (ref 8.6–10.4)
CHLORIDE SERPL-SCNC: 104 MMOL/L (ref 98–107)
CO2 SERPL-SCNC: 24 MMOL/L (ref 20–31)
CREAT SERPL-MCNC: 0.4 MG/DL (ref 0.5–0.9)
ERYTHROCYTE [DISTWIDTH] IN BLOOD BY AUTOMATED COUNT: 16 % (ref 11.8–14.4)
GFR, ESTIMATED: >90 ML/MIN/1.73M2
GLUCOSE SERPL-MCNC: 268 MG/DL (ref 74–99)
HCT VFR BLD AUTO: 34.9 % (ref 36.3–47.1)
HGB BLD-MCNC: 10.1 G/DL (ref 11.9–15.1)
MCH RBC QN AUTO: 26.5 PG (ref 25.2–33.5)
MCHC RBC AUTO-ENTMCNC: 28.9 G/DL (ref 28.4–34.8)
MCV RBC AUTO: 91.6 FL (ref 82.6–102.9)
NRBC BLD-RTO: 0 PER 100 WBC
PLATELET # BLD AUTO: 395 K/UL (ref 138–453)
PMV BLD AUTO: 12.7 FL (ref 8.1–13.5)
POTASSIUM SERPL-SCNC: 5 MMOL/L (ref 3.7–5.3)
PROT SERPL-MCNC: 5.3 G/DL (ref 6.6–8.7)
RBC # BLD AUTO: 3.81 M/UL (ref 3.95–5.11)
SODIUM SERPL-SCNC: 134 MMOL/L (ref 136–145)
WBC OTHER # BLD: 5.2 K/UL (ref 3.5–11.3)

## 2024-08-26 PROCEDURE — 80053 COMPREHEN METABOLIC PANEL: CPT

## 2024-08-26 PROCEDURE — 82140 ASSAY OF AMMONIA: CPT

## 2024-08-26 PROCEDURE — P9603 ONE-WAY ALLOW PRORATED MILES: HCPCS

## 2024-08-26 PROCEDURE — 36415 COLL VENOUS BLD VENIPUNCTURE: CPT

## 2024-08-26 PROCEDURE — 85027 COMPLETE CBC AUTOMATED: CPT

## 2024-08-26 NOTE — TELEPHONE ENCOUNTER
Patient's [facility] wound care appointment canceled today due to patient choosing to be seen by in house wound care physician at her facility [facility]. So until she discharged from the facility she will be seen in house.

## 2024-08-29 ENCOUNTER — TELEPHONE (OUTPATIENT)
Dept: ORTHOPEDIC SURGERY | Age: 44
End: 2024-08-29

## 2024-08-29 NOTE — TELEPHONE ENCOUNTER
Left voicemail for patient and informed her that due to her previous surgical history patient should cancel appointment with Dariel Pugh PA-C and reschedule with Dr. Traore. Patient voices understanding.

## 2024-08-29 NOTE — TELEPHONE ENCOUNTER
Per both providers. Spoke with nurse.   To ensure continuity of care patient should follow up with  surgical care team from [facility].

## 2024-08-29 NOTE — TELEPHONE ENCOUNTER
Brii from [facility] called in stating pt was scheduled with renee contreras but per georgi Saenz is not the appropriate provider to address pts complexities and if pt does not wish to  f/u with dr zacarias pt can try to be scheduled with dr roa. Pt has a left leg fx imaging was done at the facility. Brii is requesting a call back at  [phone] ext [phone]

## 2024-08-30 ENCOUNTER — APPOINTMENT (OUTPATIENT)
Dept: GENERAL RADIOLOGY | Age: 44
End: 2024-08-30
Payer: COMMERCIAL

## 2024-08-30 ENCOUNTER — HOSPITAL ENCOUNTER (EMERGENCY)
Age: 44
Discharge: OTHER INSTITUTION | End: 2024-08-30
Attending: STUDENT IN AN ORGANIZED HEALTH CARE EDUCATION/TRAINING PROGRAM
Payer: COMMERCIAL

## 2024-08-30 VITALS
BODY MASS INDEX: 32.58 KG/M2 | DIASTOLIC BLOOD PRESSURE: 68 MMHG | HEIGHT: 68 IN | OXYGEN SATURATION: 95 % | HEART RATE: 95 BPM | SYSTOLIC BLOOD PRESSURE: 109 MMHG | RESPIRATION RATE: 16 BRPM | WEIGHT: 215 LBS | TEMPERATURE: 98.8 F

## 2024-08-30 DIAGNOSIS — S82.202A CLOSED FRACTURE OF LEFT TIBIA AND FIBULA, INITIAL ENCOUNTER: Primary | ICD-10-CM

## 2024-08-30 DIAGNOSIS — S82.402A CLOSED FRACTURE OF LEFT TIBIA AND FIBULA, INITIAL ENCOUNTER: Primary | ICD-10-CM

## 2024-08-30 LAB
ANION GAP SERPL CALCULATED.3IONS-SCNC: 10 MMOL/L (ref 9–17)
BASOPHILS # BLD: 0.09 K/UL (ref 0–0.2)
BASOPHILS NFR BLD: 1 % (ref 0–2)
BUN SERPL-MCNC: 10 MG/DL (ref 6–20)
CALCIUM SERPL-MCNC: 7.8 MG/DL (ref 8.6–10.4)
CHLORIDE SERPL-SCNC: 98 MMOL/L (ref 98–107)
CO2 SERPL-SCNC: 23 MMOL/L (ref 20–31)
CREAT SERPL-MCNC: <0.4 MG/DL (ref 0.5–0.9)
EOSINOPHIL # BLD: 0.27 K/UL (ref 0–0.4)
EOSINOPHILS RELATIVE PERCENT: 3 % (ref 0–4)
ERYTHROCYTE [DISTWIDTH] IN BLOOD BY AUTOMATED COUNT: 16.6 % (ref 11.5–14.9)
GFR, ESTIMATED: ABNORMAL ML/MIN/1.73M2
GLUCOSE SERPL-MCNC: 309 MG/DL (ref 70–99)
HCT VFR BLD AUTO: 34.1 % (ref 36–46)
HGB BLD-MCNC: 10.5 G/DL (ref 12–16)
INR PPP: 1
LYMPHOCYTES NFR BLD: 0.98 K/UL (ref 1–4.8)
LYMPHOCYTES RELATIVE PERCENT: 11 % (ref 24–44)
MAGNESIUM SERPL-MCNC: 1.7 MG/DL (ref 1.6–2.6)
MCH RBC QN AUTO: 25.9 PG (ref 26–34)
MCHC RBC AUTO-ENTMCNC: 30.9 G/DL (ref 31–37)
MCV RBC AUTO: 83.7 FL (ref 80–100)
MONOCYTES NFR BLD: 0.89 K/UL (ref 0.1–1.3)
MONOCYTES NFR BLD: 10 % (ref 1–7)
MORPHOLOGY: ABNORMAL
NEUTROPHILS NFR BLD: 75 % (ref 36–66)
NEUTS SEG NFR BLD: 6.67 K/UL (ref 1.3–9.1)
PLATELET # BLD AUTO: 562 K/UL (ref 150–450)
PMV BLD AUTO: 9.3 FL (ref 6–12)
POTASSIUM SERPL-SCNC: 4.3 MMOL/L (ref 3.7–5.3)
PROTHROMBIN TIME: 13.9 SEC (ref 11.8–14.6)
RBC # BLD AUTO: 4.07 M/UL (ref 4–5.2)
SODIUM SERPL-SCNC: 131 MMOL/L (ref 135–144)
WBC OTHER # BLD: 8.9 K/UL (ref 3.5–11)

## 2024-08-30 PROCEDURE — 99284 EMERGENCY DEPT VISIT MOD MDM: CPT

## 2024-08-30 PROCEDURE — 73590 X-RAY EXAM OF LOWER LEG: CPT

## 2024-08-30 PROCEDURE — 96374 THER/PROPH/DIAG INJ IV PUSH: CPT

## 2024-08-30 PROCEDURE — 85025 COMPLETE CBC W/AUTO DIFF WBC: CPT

## 2024-08-30 PROCEDURE — 85610 PROTHROMBIN TIME: CPT

## 2024-08-30 PROCEDURE — 83735 ASSAY OF MAGNESIUM: CPT

## 2024-08-30 PROCEDURE — 80048 BASIC METABOLIC PNL TOTAL CA: CPT

## 2024-08-30 PROCEDURE — 36415 COLL VENOUS BLD VENIPUNCTURE: CPT

## 2024-08-30 PROCEDURE — 6360000002 HC RX W HCPCS: Performed by: STUDENT IN AN ORGANIZED HEALTH CARE EDUCATION/TRAINING PROGRAM

## 2024-08-30 PROCEDURE — 96376 TX/PRO/DX INJ SAME DRUG ADON: CPT

## 2024-08-30 PROCEDURE — 73562 X-RAY EXAM OF KNEE 3: CPT

## 2024-08-30 RX ORDER — MORPHINE SULFATE 4 MG/ML
4 INJECTION, SOLUTION INTRAMUSCULAR; INTRAVENOUS ONCE
Status: COMPLETED | OUTPATIENT
Start: 2024-08-30 | End: 2024-08-30

## 2024-08-30 RX ADMIN — MORPHINE SULFATE 4 MG: 4 INJECTION, SOLUTION INTRAMUSCULAR; INTRAVENOUS at 03:49

## 2024-08-30 RX ADMIN — MORPHINE SULFATE 4 MG: 4 INJECTION, SOLUTION INTRAMUSCULAR; INTRAVENOUS at 02:54

## 2024-08-30 ASSESSMENT — PAIN SCALES - GENERAL
PAINLEVEL_OUTOF10: 7
PAINLEVEL_OUTOF10: 7
PAINLEVEL_OUTOF10: 8

## 2024-08-30 ASSESSMENT — ENCOUNTER SYMPTOMS
EYE REDNESS: 0
DIARRHEA: 0
NAUSEA: 0
SHORTNESS OF BREATH: 0
ABDOMINAL PAIN: 0
RHINORRHEA: 0
VOMITING: 0
SORE THROAT: 0
EYE DISCHARGE: 0

## 2024-08-30 ASSESSMENT — PAIN DESCRIPTION - LOCATION: LOCATION: LEG

## 2024-08-30 ASSESSMENT — PAIN DESCRIPTION - ORIENTATION: ORIENTATION: LEFT

## 2024-08-30 ASSESSMENT — PAIN - FUNCTIONAL ASSESSMENT: PAIN_FUNCTIONAL_ASSESSMENT: 0-10

## 2024-08-30 NOTE — ED TRIAGE NOTES
Mode of arrival (squad #, walk in, police, etc) : Medic 42        Chief complaint(s): Wound check, leg pain        Arrival Note (brief scenario, treatment PTA, etc).: Pt states she fell about a week ago and has been complaining of left leg pain. Pt states she resides at [facility] and they did an X ray of her left leg today which shows a tib/fib fracture. Pt also states she has a wound on her left hip that wound care has been seeing her for. She states the wound care nurse discontinued the wound vac today d/t the extent of the wound. Pt A&Ox4. Pt denies chest pain and shortness of breath at this time        C= \"Have you ever felt that you should Cut down on your drinking?\"  No  A= \"Have people Annoyed you by criticizing your drinking?\"  No  G= \"Have you ever felt bad or Guilty about your drinking?\"  No  E= \"Have you ever had a drink as an Eye-opener first thing in the morning to steady your nerves or to help a hangover?\"  No      Deferred []      Reason for deferring: N/A    *If yes to two or more: probable alcohol abuse.*

## 2024-08-30 NOTE — ED NOTES
EMERGENCY DEPARTMENT ENCOUNTER   ATTENDING ATTESTATION     Pt Name: Krista Chiang  MRN: [id number]  Birthdate 1980  Date of evaluation: 8/30/24       Krista Chiang is a 44 y.o. female who presents with Wound Check and Leg Pain (Left)    Patient with extensive history of left hip infection    Presents with left hip pain    On exam wound appears clean     Patient unable to care for self at home    MDM:     Will admit for pain control, pt/ot and placement    Vitals:   Vitals:    08/30/24 0148   BP: 117/74   Pulse: (!) 101   Resp: 20   Temp: 98.8 °F (37.1 °C)   TempSrc: Oral   SpO2: 99%   Weight: 97.5 kg (215 lb)   Height: 1.727 m (5' 8\")         I personally saw and examined the patient. I have reviewed and agree with the resident's findings, including all diagnostic interpretations and treatment plan as written. I was present for the key portions of any procedures performed and the inclusive time noted for any critical care statement.    Skinny Haines MD  Attending Emergency Physician          Skinny Haines MD  08/30/24 0206

## 2024-08-30 NOTE — ED PROVIDER NOTES
EMERGENCY DEPARTMENT ENCOUNTER    Pt Name: Krista Chiang  MRN: [id number]  Birthdate 1980  Date of evaluation: 8/30/24  CHIEF COMPLAINT       Chief Complaint   Patient presents with    Wound Check    Leg Pain     Left     HISTORY OF PRESENT ILLNESS   Patient is a 44-year-old she has had a complicated history with a left hip infection at [facility] has a large wound undergoing wound care now in a nursing facility    She was discharged home 8/15/24 and then apparently had several falls.    She presented this ER on 8/16/24  and was noted to have left hip pain and a right knee abrasion    She had CT head, left femur xray, and right knee xray unremarkable and was discharged back to home.    On 8/19/24 wound care went to her home and had concern of wound infection so she was sent back to our ER and ultimately admitted here for placement in nursing facility as she was not able to function at home.     While at nursing facility she has had continued left leg pain and she describes now as more just below the knee. Reports no new falls or injuries. Sent back here from nursing facility for evaluation                  REVIEW OF SYSTEMS     Review of Systems   Constitutional:  Negative for chills and fever.   HENT:  Negative for rhinorrhea and sore throat.    Eyes:  Negative for discharge and redness.   Respiratory:  Negative for shortness of breath.    Cardiovascular:  Negative for chest pain.   Gastrointestinal:  Negative for abdominal pain, diarrhea, nausea and vomiting.   Genitourinary:  Negative for dysuria, frequency and urgency.   Musculoskeletal:  Negative for arthralgias and myalgias.        Left leg pain   Skin:  Negative for rash.   Neurological:  Negative for weakness and numbness.   Psychiatric/Behavioral:  Negative for suicidal ideas.    All other systems reviewed and are negative.    PASTMEDICAL HISTORY     Past Medical History:   Diagnosis Date    Alcohol abuse     Recurrent episodes of withdrawal  apply route daily    LANCETS 30G MISC    Testing twice a day.  Please dispense according to patients insurance.    LOPERAMIDE (IMODIUM) 2 MG CAPSULE    Take 1 capsule by mouth 4 times daily as needed for Diarrhea    LORAZEPAM (ATIVAN) 1 MG TABLET    Take 0.5 tablets by mouth every 8 hours as needed for Anxiety.    MELATONIN 3 MG TABS TABLET    Take 2 tablets by mouth nightly as needed    MIDODRINE (PROAMATINE) 2.5 MG TABLET    Take 1 tablet by mouth 2 times daily as needed (for hypotension)    OMEPRAZOLE (PRILOSEC) 20 MG DELAYED RELEASE CAPSULE    Take 1 capsule by mouth daily    PRAVASTATIN (PRAVACHOL) 40 MG TABLET    TAKE 1 TABLET(40 MG) BY MOUTH DAILY    PROMETHAZINE (PHENERGAN) 25 MG TABLET    Take 1 tablet by mouth every 8 hours as needed for Nausea    SYRINGE/NEEDLE, DISP, (SYRINGE 3CC/25GX1\") 25G X 1\" 3 ML MISC    To be used with B12 injections monthly     ALLERGIES     is allergic to aripiprazole, aspirin, betadine [povidone iodine], celebrex [celecoxib], celexa [citalopram hydrobromide], citalopram, furosemide, nitrofurantoin, tricyclic antidepressants, codeine, lithium, paroxetine, paxil [paroxetine hcl], povidone iodine, sertraline, tape [adhesive tape], and tegretol [carbamazepine].  FAMILY HISTORY     She indicated that her mother is alive. She indicated that her father is . She indicated that her brother is alive. She indicated that the status of her maternal aunt is unknown. She indicated that the status of her maternal uncle is unknown. She indicated that the status of her maternal cousin is unknown.     SOCIAL HISTORY       Social History     Tobacco Use    Smoking status: Never    Smokeless tobacco: Never   Vaping Use    Vaping status: Never Used   Substance Use Topics    Alcohol use: Not Currently    Drug use: No     PHYSICAL EXAM     INITIAL VITALS: /74   Pulse 98   Temp 98.8 °F (37.1 °C) (Oral)   Resp 20   Ht 1.727 m (5' 8\")   Wt 97.5 kg (215 lb)   SpO2 96%   BMI 32.69 kg/m²

## 2024-08-30 NOTE — PLAN OF CARE
Problem: Chronic Conditions and Co-morbidities  Goal: Patient's chronic conditions and co-morbidity symptoms are monitored and maintained or improved  Outcome: Progressing     Problem: Pain  Goal: Verbalizes/displays adequate comfort level or baseline comfort level  Outcome: Progressing     Problem: Wound:  Goal: Will show signs of wound healing; wound closure and no evidence of infection  Description: Will show signs of wound healing; wound closure and no evidence of infection  Outcome: Progressing     Problem: Falls - Risk of:  Goal: Will remain free from falls  Description: Will remain free from falls  Outcome: Progressing     
30-Aug-2024 12:47
26-Aug-2024 18:31

## 2024-08-30 NOTE — DISCHARGE INSTRUCTIONS
Do not put any weight on your left leg    Follow-up with Dr. Montano as soon as possible    Return for severe pain numbness tingling weakness fevers chills

## 2024-09-03 ENCOUNTER — HOSPITAL ENCOUNTER (OUTPATIENT)
Age: 44
Setting detail: SPECIMEN
Discharge: HOME | End: 2024-09-03

## 2024-09-04 ENCOUNTER — TELEPHONE (OUTPATIENT)
Dept: ORTHOPEDIC SURGERY | Age: 44
End: 2024-09-04

## 2024-09-04 ENCOUNTER — HOSPITAL ENCOUNTER (OUTPATIENT)
Age: 44
Setting detail: SPECIMEN
Discharge: HOME | End: 2024-09-04

## 2024-09-04 LAB
ALBUMIN SERPL-MCNC: 1.7 G/DL (ref 3.5–5.2)
ALP SERPL-CCNC: 211 U/L (ref 35–104)
ALT SERPL-CCNC: 18 U/L (ref 5–33)
ANION GAP SERPL CALCULATED.3IONS-SCNC: 10 MMOL/L (ref 9–17)
AST SERPL-CCNC: 24 U/L
BILIRUB SERPL-MCNC: 0.1 MG/DL (ref 0.3–1.2)
BUN SERPL-MCNC: 10 MG/DL (ref 6–20)
BUN/CREAT SERPL: 14 (ref 9–20)
CALCIUM SERPL-MCNC: 7.7 MG/DL (ref 8.6–10.4)
CHLORIDE SERPL-SCNC: 103 MMOL/L (ref 98–107)
CO2 SERPL-SCNC: 21 MMOL/L (ref 20–31)
CREAT SERPL-MCNC: 0.7 MG/DL (ref 0.5–0.9)
ERYTHROCYTE [DISTWIDTH] IN BLOOD BY AUTOMATED COUNT: 15.4 % (ref 11.8–14.4)
GFR, ESTIMATED: >90 ML/MIN/1.73M2
GLUCOSE SERPL-MCNC: 247 MG/DL (ref 70–99)
HCT VFR BLD AUTO: 30.9 % (ref 36.3–47.1)
HGB BLD-MCNC: 9.3 G/DL (ref 11.9–15.1)
MCH RBC QN AUTO: 26.5 PG (ref 25.2–33.5)
MCHC RBC AUTO-ENTMCNC: 30.1 G/DL (ref 28.4–34.8)
MCV RBC AUTO: 88 FL (ref 82.6–102.9)
NRBC BLD-RTO: 0 PER 100 WBC
PLATELET # BLD AUTO: 299 K/UL (ref 138–453)
PMV BLD AUTO: 13.7 FL (ref 8.1–13.5)
POTASSIUM SERPL-SCNC: 4.4 MMOL/L (ref 3.7–5.3)
PROT SERPL-MCNC: 5.1 G/DL (ref 6.4–8.3)
RBC # BLD AUTO: 3.51 M/UL (ref 3.95–5.11)
SODIUM SERPL-SCNC: 134 MMOL/L (ref 135–144)
WBC OTHER # BLD: 7.4 K/UL (ref 3.5–11.3)

## 2024-09-04 PROCEDURE — 80053 COMPREHEN METABOLIC PANEL: CPT

## 2024-09-04 PROCEDURE — P9603 ONE-WAY ALLOW PRORATED MILES: HCPCS

## 2024-09-04 PROCEDURE — 36415 COLL VENOUS BLD VENIPUNCTURE: CPT

## 2024-09-04 PROCEDURE — 85027 COMPLETE CBC AUTOMATED: CPT

## 2024-09-04 NOTE — TELEPHONE ENCOUNTER
[facility]  call to schedule a ED follow up appointment with Dr. Traore for patient left hip and leg, she was seen at ED on 8/30/24 was told to follow up with Dr. Traore, per notes on 8/29/24 was advice to follow up with [facility] but Grace stated that she was told to follow up with Dr. Traore who did patient surgery.     Please call Grace at [phone] , patient will be transport by stretcher to appointment.

## 2024-09-04 NOTE — TELEPHONE ENCOUNTER
Called [facility], no answer left VMM that patient would need to f/u with [facility] for her hip, and that she would need to f/u with different ortho provider for her tib/fib fx as Dr. Traore does not have any openings at this time

## 2024-09-05 ENCOUNTER — TRANSCRIBE ORDERS (OUTPATIENT)
Dept: ADMINISTRATIVE | Age: 44
End: 2024-09-05

## 2024-09-05 DIAGNOSIS — R22.42 LOCALIZED SWELLING, MASS, OR LUMP OF LOWER EXTREMITY, LEFT: Primary | ICD-10-CM

## 2024-09-05 DIAGNOSIS — M79.605 LEFT LEG PAIN: ICD-10-CM

## 2024-09-10 ENCOUNTER — HOSPITAL ENCOUNTER (OUTPATIENT)
Age: 44
Setting detail: SPECIMEN
Discharge: HOME | End: 2024-09-10

## 2024-09-10 LAB
ALBUMIN SERPL-MCNC: 2.1 G/DL (ref 3.5–5.2)
ALBUMIN/GLOB SERPL: 1 {RATIO} (ref 1–2.5)
ALP SERPL-CCNC: 209 U/L (ref 35–104)
ALT SERPL-CCNC: 15 U/L (ref 10–35)
ANION GAP SERPL CALCULATED.3IONS-SCNC: 11 MMOL/L (ref 9–16)
AST SERPL-CCNC: 27 U/L (ref 10–35)
BILIRUB SERPL-MCNC: <0.2 MG/DL (ref 0–1.2)
BUN SERPL-MCNC: 8 MG/DL (ref 6–20)
CALCIUM SERPL-MCNC: 8 MG/DL (ref 8.6–10.4)
CHLORIDE SERPL-SCNC: 111 MMOL/L (ref 98–107)
CO2 SERPL-SCNC: 25 MMOL/L (ref 20–31)
CREAT SERPL-MCNC: 0.4 MG/DL (ref 0.5–0.9)
ERYTHROCYTE [DISTWIDTH] IN BLOOD BY AUTOMATED COUNT: 16 % (ref 11.8–14.4)
GFR, ESTIMATED: >90 ML/MIN/1.73M2
GLUCOSE SERPL-MCNC: 169 MG/DL (ref 74–99)
HCT VFR BLD AUTO: 32.4 % (ref 36.3–47.1)
HGB BLD-MCNC: 9.6 G/DL (ref 11.9–15.1)
MCH RBC QN AUTO: 25.7 PG (ref 25.2–33.5)
MCHC RBC AUTO-ENTMCNC: 29.6 G/DL (ref 28.4–34.8)
MCV RBC AUTO: 86.6 FL (ref 82.6–102.9)
NRBC BLD-RTO: 0 PER 100 WBC
PLATELET # BLD AUTO: 494 K/UL (ref 138–453)
PMV BLD AUTO: 12.9 FL (ref 8.1–13.5)
POTASSIUM SERPL-SCNC: 5.2 MMOL/L (ref 3.7–5.3)
PROT SERPL-MCNC: 6 G/DL (ref 6.6–8.7)
RBC # BLD AUTO: 3.74 M/UL (ref 3.95–5.11)
SODIUM SERPL-SCNC: 147 MMOL/L (ref 136–145)
WBC OTHER # BLD: 6.3 K/UL (ref 3.5–11.3)

## 2024-09-10 PROCEDURE — 36415 COLL VENOUS BLD VENIPUNCTURE: CPT

## 2024-09-10 PROCEDURE — P9603 ONE-WAY ALLOW PRORATED MILES: HCPCS

## 2024-09-10 PROCEDURE — 85027 COMPLETE CBC AUTOMATED: CPT

## 2024-09-10 PROCEDURE — 80053 COMPREHEN METABOLIC PANEL: CPT

## 2024-09-20 ENCOUNTER — HOSPITAL ENCOUNTER (OUTPATIENT)
Dept: MRI IMAGING | Age: 44
Discharge: HOME | End: 2024-09-22
Payer: COMMERCIAL

## 2024-09-20 DIAGNOSIS — R22.42 LOCALIZED SWELLING, MASS, OR LUMP OF LOWER EXTREMITY, LEFT: ICD-10-CM

## 2024-09-20 DIAGNOSIS — M79.605 LEFT LEG PAIN: ICD-10-CM

## 2024-09-20 PROCEDURE — 73718 MRI LOWER EXTREMITY W/O DYE: CPT

## 2024-09-21 ENCOUNTER — HOSPITAL ENCOUNTER (OUTPATIENT)
Age: 44
Setting detail: SPECIMEN
Discharge: HOME | End: 2024-09-21

## 2024-09-21 LAB
ALBUMIN SERPL-MCNC: 2 G/DL (ref 3.5–5.2)
ALP SERPL-CCNC: 166 U/L (ref 35–104)
ALT SERPL-CCNC: 13 U/L (ref 5–33)
ANION GAP SERPL CALCULATED.3IONS-SCNC: 14 MMOL/L (ref 9–17)
AST SERPL-CCNC: 22 U/L
BILIRUB SERPL-MCNC: 0.3 MG/DL (ref 0.3–1.2)
BUN SERPL-MCNC: 14 MG/DL (ref 6–20)
BUN/CREAT SERPL: ABNORMAL (ref 9–20)
CALCIUM SERPL-MCNC: 8.1 MG/DL (ref 8.6–10.4)
CHLORIDE SERPL-SCNC: 98 MMOL/L (ref 98–107)
CO2 SERPL-SCNC: 18 MMOL/L (ref 20–31)
CREAT SERPL-MCNC: <0.4 MG/DL (ref 0.5–0.9)
ERYTHROCYTE [DISTWIDTH] IN BLOOD BY AUTOMATED COUNT: 16.8 % (ref 11.8–14.4)
GFR, ESTIMATED: ABNORMAL ML/MIN/1.73M2
GLUCOSE SERPL-MCNC: 196 MG/DL (ref 70–99)
HCT VFR BLD AUTO: 34.4 % (ref 36.3–47.1)
HGB BLD-MCNC: 10.1 G/DL (ref 11.9–15.1)
MCH RBC QN AUTO: 25.8 PG (ref 25.2–33.5)
MCHC RBC AUTO-ENTMCNC: 29.4 G/DL (ref 28.4–34.8)
MCV RBC AUTO: 87.8 FL (ref 82.6–102.9)
NRBC BLD-RTO: 0 PER 100 WBC
PLATELET # BLD AUTO: 288 K/UL (ref 138–453)
PMV BLD AUTO: 13 FL (ref 8.1–13.5)
POTASSIUM SERPL-SCNC: 3.5 MMOL/L (ref 3.7–5.3)
PROT SERPL-MCNC: 5.9 G/DL (ref 6.4–8.3)
RBC # BLD AUTO: 3.92 M/UL (ref 3.95–5.11)
SODIUM SERPL-SCNC: 130 MMOL/L (ref 135–144)
WBC OTHER # BLD: 15.9 K/UL (ref 3.5–11.3)

## 2024-09-21 PROCEDURE — 80053 COMPREHEN METABOLIC PANEL: CPT

## 2024-09-21 PROCEDURE — P9603 ONE-WAY ALLOW PRORATED MILES: HCPCS

## 2024-09-21 PROCEDURE — 36415 COLL VENOUS BLD VENIPUNCTURE: CPT

## 2024-09-21 PROCEDURE — 85027 COMPLETE CBC AUTOMATED: CPT

## 2024-09-23 ENCOUNTER — HOSPITAL ENCOUNTER (OUTPATIENT)
Age: 44
Setting detail: SPECIMEN
Discharge: HOME | End: 2024-09-23
Payer: COMMERCIAL

## 2024-09-23 LAB
ANION GAP SERPL CALCULATED.3IONS-SCNC: 10 MMOL/L (ref 9–16)
BASOPHILS # BLD: <0.03 K/UL (ref 0–0.2)
BASOPHILS NFR BLD: 0 % (ref 0–2)
BUN SERPL-MCNC: 15 MG/DL (ref 6–20)
CALCIUM SERPL-MCNC: 8.2 MG/DL (ref 8.6–10.4)
CHLORIDE SERPL-SCNC: 107 MMOL/L (ref 98–107)
CO2 SERPL-SCNC: 22 MMOL/L (ref 20–31)
CREAT SERPL-MCNC: 0.4 MG/DL (ref 0.5–0.9)
EOSINOPHIL # BLD: 0.12 K/UL (ref 0–0.44)
EOSINOPHILS RELATIVE PERCENT: 3 % (ref 1–4)
ERYTHROCYTE [DISTWIDTH] IN BLOOD BY AUTOMATED COUNT: 16.5 % (ref 11.8–14.4)
GFR, ESTIMATED: >90 ML/MIN/1.73M2
GLUCOSE SERPL-MCNC: 280 MG/DL (ref 74–99)
HCT VFR BLD AUTO: 29.3 % (ref 36.3–47.1)
HGB BLD-MCNC: 8.6 G/DL (ref 11.9–15.1)
IMM GRANULOCYTES # BLD AUTO: <0.03 K/UL (ref 0–0.3)
IMM GRANULOCYTES NFR BLD: 0 %
LYMPHOCYTES NFR BLD: 1.17 K/UL (ref 1.1–3.7)
LYMPHOCYTES RELATIVE PERCENT: 24 % (ref 24–43)
MCH RBC QN AUTO: 25.5 PG (ref 25.2–33.5)
MCHC RBC AUTO-ENTMCNC: 29.4 G/DL (ref 28.4–34.8)
MCV RBC AUTO: 86.9 FL (ref 82.6–102.9)
MONOCYTES NFR BLD: 0.41 K/UL (ref 0.1–1.2)
MONOCYTES NFR BLD: 8 % (ref 3–12)
NEUTROPHILS NFR BLD: 65 % (ref 36–65)
NEUTS SEG NFR BLD: 3.15 K/UL (ref 1.5–8.1)
NRBC BLD-RTO: 0 PER 100 WBC
PLATELET # BLD AUTO: ABNORMAL K/UL (ref 138–453)
PLATELET, FLUORESCENCE: ABNORMAL K/UL (ref 138–453)
POTASSIUM SERPL-SCNC: 4.1 MMOL/L (ref 3.7–5.3)
RBC # BLD AUTO: 3.37 M/UL (ref 3.95–5.11)
RBC # BLD: ABNORMAL 10*6/UL
SODIUM SERPL-SCNC: 139 MMOL/L (ref 136–145)
WBC OTHER # BLD: 4.9 K/UL (ref 3.5–11.3)

## 2024-09-23 PROCEDURE — 36415 COLL VENOUS BLD VENIPUNCTURE: CPT

## 2024-09-23 PROCEDURE — 80048 BASIC METABOLIC PNL TOTAL CA: CPT

## 2024-09-23 PROCEDURE — P9603 ONE-WAY ALLOW PRORATED MILES: HCPCS

## 2024-09-23 PROCEDURE — 85025 COMPLETE CBC W/AUTO DIFF WBC: CPT

## 2024-09-23 PROCEDURE — 85055 RETICULATED PLATELET ASSAY: CPT

## 2024-09-24 ENCOUNTER — HOSPITAL ENCOUNTER (OUTPATIENT)
Age: 44
Setting detail: SPECIMEN
Discharge: HOME | End: 2024-09-24
Payer: COMMERCIAL

## 2024-09-24 LAB
AMORPH SED URNS QL MICRO: ABNORMAL
BACTERIA URNS QL MICRO: ABNORMAL
BILIRUB UR QL STRIP: NEGATIVE
CLARITY UR: ABNORMAL
COLOR UR: YELLOW
CRYSTALS URNS MICRO: ABNORMAL /HPF
EPI CELLS #/AREA URNS HPF: ABNORMAL /HPF (ref 0–5)
GLUCOSE UR STRIP-MCNC: NEGATIVE MG/DL
HGB UR QL STRIP.AUTO: ABNORMAL
KETONES UR STRIP-MCNC: NEGATIVE MG/DL
LEUKOCYTE ESTERASE UR QL STRIP: ABNORMAL
NITRITE UR QL STRIP: NEGATIVE
PH UR STRIP: 6 [PH] (ref 5–8)
PROT UR STRIP-MCNC: NEGATIVE MG/DL
RBC #/AREA URNS HPF: ABNORMAL /HPF (ref 0–2)
SP GR UR STRIP: 1.01 (ref 1–1.03)
UROBILINOGEN UR STRIP-ACNC: NORMAL EU/DL (ref 0–1)
WBC #/AREA URNS HPF: ABNORMAL /HPF (ref 0–5)

## 2024-09-24 PROCEDURE — 81001 URINALYSIS AUTO W/SCOPE: CPT

## 2024-09-24 PROCEDURE — 87086 URINE CULTURE/COLONY COUNT: CPT

## 2024-09-25 LAB
MICROORGANISM SPEC CULT: NORMAL
SERVICE CMNT-IMP: NORMAL
SPECIMEN DESCRIPTION: NORMAL

## 2024-09-26 ENCOUNTER — HOSPITAL ENCOUNTER (OUTPATIENT)
Age: 44
Setting detail: SPECIMEN
Discharge: HOME | End: 2024-09-26

## 2024-09-26 LAB
BILIRUB UR QL STRIP: NEGATIVE
CLARITY UR: CLEAR
COLOR UR: YELLOW
COMMENT: ABNORMAL
GLUCOSE UR STRIP-MCNC: ABNORMAL MG/DL
HGB UR QL STRIP.AUTO: NEGATIVE
KETONES UR STRIP-MCNC: NEGATIVE MG/DL
LEUKOCYTE ESTERASE UR QL STRIP: NEGATIVE
NITRITE UR QL STRIP: NEGATIVE
PH UR STRIP: 6 [PH] (ref 5–8)
PROT UR STRIP-MCNC: NEGATIVE MG/DL
SP GR UR STRIP: 1.02 (ref 1–1.03)
UROBILINOGEN UR STRIP-ACNC: NORMAL EU/DL (ref 0–1)

## 2024-09-26 PROCEDURE — 87086 URINE CULTURE/COLONY COUNT: CPT

## 2024-09-26 PROCEDURE — 81003 URINALYSIS AUTO W/O SCOPE: CPT

## 2024-09-27 LAB
MICROORGANISM SPEC CULT: NORMAL
SERVICE CMNT-IMP: NORMAL
SPECIMEN DESCRIPTION: NORMAL

## 2024-10-04 ENCOUNTER — APPOINTMENT (OUTPATIENT)
Dept: CT IMAGING | Age: 44
End: 2024-10-04
Payer: COMMERCIAL

## 2024-10-04 ENCOUNTER — HOSPITAL ENCOUNTER (EMERGENCY)
Age: 44
Discharge: HOME | End: 2024-10-04
Attending: EMERGENCY MEDICINE
Payer: COMMERCIAL

## 2024-10-04 VITALS
DIASTOLIC BLOOD PRESSURE: 90 MMHG | HEIGHT: 68 IN | WEIGHT: 210 LBS | HEART RATE: 78 BPM | BODY MASS INDEX: 31.83 KG/M2 | RESPIRATION RATE: 16 BRPM | TEMPERATURE: 98.4 F | OXYGEN SATURATION: 97 % | SYSTOLIC BLOOD PRESSURE: 122 MMHG

## 2024-10-04 DIAGNOSIS — M54.41 ACUTE RIGHT-SIDED LOW BACK PAIN WITH RIGHT-SIDED SCIATICA: ICD-10-CM
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
## 2024-10-04 PROCEDURE — 99284 EMERGENCY DEPT VISIT MOD MDM: CPT

## 2024-10-04 PROCEDURE — 6370000000 HC RX 637 (ALT 250 FOR IP): Performed by: EMERGENCY MEDICINE

## 2024-10-04 PROCEDURE — 72131 CT LUMBAR SPINE W/O DYE: CPT

## 2024-10-04 PROCEDURE — 73700 CT LOWER EXTREMITY W/O DYE: CPT

## 2024-10-04 RX ORDER — OXYCODONE HYDROCHLORIDE 5 MG/1
10 TABLET ORAL ONCE
Status: COMPLETED | OUTPATIENT
Start: 2024-10-04 | End: 2024-10-04

## 2024-10-04 RX ORDER — OXYCODONE AND ACETAMINOPHEN 5; 325 MG/1; MG/1
1 TABLET ORAL ONCE
Status: COMPLETED | OUTPATIENT
Start: 2024-10-04 | End: 2024-10-04

## 2024-10-04 RX ADMIN — OXYCODONE HYDROCHLORIDE AND ACETAMINOPHEN 1 TABLET: 5; 325 TABLET ORAL at 13:35

## 2024-10-04 RX ADMIN — OXYCODONE HYDROCHLORIDE 10 MG: 5 TABLET ORAL at 15:13

## 2024-10-04 RX ADMIN — TIZANIDINE 4 MG: 4 TABLET ORAL at 15:19

## 2024-10-04 ASSESSMENT — ENCOUNTER SYMPTOMS
SHORTNESS OF BREATH: 0
RHINORRHEA: 0
BLOOD IN STOOL: 0
COLOR CHANGE: 0
EYE PAIN: 0
CONSTIPATION: 0
ABDOMINAL PAIN: 0
CHEST TIGHTNESS: 0
DIARRHEA: 0
SINUS PRESSURE: 0
EYE DISCHARGE: 0
BACK PAIN: 1
FACIAL SWELLING: 0
VOMITING: 0
COUGH: 0
EYE REDNESS: 0
WHEEZING: 0
SORE THROAT: 0
NAUSEA: 0
TROUBLE SWALLOWING: 0

## 2024-10-04 ASSESSMENT — PAIN DESCRIPTION - ORIENTATION: ORIENTATION: RIGHT

## 2024-10-04 ASSESSMENT — PAIN SCALES - GENERAL
PAINLEVEL_OUTOF10: 3
PAINLEVEL_OUTOF10: 8
PAINLEVEL_OUTOF10: 9

## 2024-10-04 ASSESSMENT — PAIN DESCRIPTION - LOCATION: LOCATION: HIP

## 2024-10-04 ASSESSMENT — PAIN - FUNCTIONAL ASSESSMENT: PAIN_FUNCTIONAL_ASSESSMENT: 0-10

## 2024-10-04 NOTE — ED PROVIDER NOTES
eMERGENCY dEPARTMENT eNCOUnter      Pt Name: Krista Chiang  MRN: [id number]  Birthdate 1980  Date of evaluation: 10/4/24      CHIEF COMPLAINT       Chief Complaint   Patient presents with    Fall    Hip Pain     Pt here after fall off commode this AM, mobile XR went at taken pelvic XR which shows possible concern for fracture          HISTORY OF PRESENT ILLNESS    Krista Chiang is a 44 y.o. female who presents complaining of hip pain.  Patient states that she fell today coming from the bathroom and landed on her back and right hip.  Patient states pain is in the middle of her back going down the right hip.  They did some x-rays at the nursing home and there is a question of a possible pelvis fracture.  Patient is just having severe amount of pain but does not think it is a hip fracture as she broke her other hip and it does not feel the same.  Patient is able to move that leg a bit but does have some pain.  No head injury no neck pain.      REVIEW OF SYSTEMS       Review of Systems   Constitutional:  Negative for activity change, appetite change, chills, diaphoresis and fever.   HENT:  Negative for congestion, ear pain, facial swelling, nosebleeds, rhinorrhea, sinus pressure, sore throat and trouble swallowing.    Eyes:  Negative for pain, discharge and redness.   Respiratory:  Negative for cough, chest tightness, shortness of breath and wheezing.    Cardiovascular:  Negative for chest pain, palpitations and leg swelling.   Gastrointestinal:  Negative for abdominal pain, blood in stool, constipation, diarrhea, nausea and vomiting.   Genitourinary:  Negative for difficulty urinating, dysuria, flank pain, frequency, genital sores and hematuria.   Musculoskeletal:  Positive for back pain. Negative for arthralgias, gait problem, joint swelling, myalgias and neck pain.        Fall   Skin:  Negative for color change, pallor, rash and wound.   Neurological:  Negative for dizziness, tremors, seizures, syncope,

## 2024-11-04 ENCOUNTER — TRANSCRIBE ORDERS (OUTPATIENT)
Dept: ADMINISTRATIVE | Age: 44
End: 2024-11-04

## 2024-11-04 DIAGNOSIS — N95.9 MENOPAUSAL AND POSTMENOPAUSAL DISORDER: Primary | ICD-10-CM

## 2024-11-12 ENCOUNTER — HOSPITAL ENCOUNTER (OUTPATIENT)
Dept: WOMENS IMAGING | Age: 44
Discharge: HOME | End: 2024-11-14
Attending: FAMILY MEDICINE
Payer: COMMERCIAL

## 2024-11-12 DIAGNOSIS — N95.9 MENOPAUSAL AND POSTMENOPAUSAL DISORDER: ICD-10-CM

## 2024-11-12 PROCEDURE — 77080 DXA BONE DENSITY AXIAL: CPT

## 2024-12-15 ENCOUNTER — APPOINTMENT (OUTPATIENT)
Dept: CT IMAGING | Age: 44
DRG: 863 | End: 2024-12-15
Payer: COMMERCIAL

## 2024-12-15 ENCOUNTER — HOSPITAL ENCOUNTER (INPATIENT)
Age: 44
LOS: 4 days | Discharge: HOME | DRG: 863 | End: 2024-12-19
Attending: STUDENT IN AN ORGANIZED HEALTH CARE EDUCATION/TRAINING PROGRAM | Admitting: INTERNAL MEDICINE
Payer: COMMERCIAL

## 2024-12-15 DIAGNOSIS — R52 INTRACTABLE PAIN: ICD-10-CM

## 2024-12-15 DIAGNOSIS — T81.49XA POSTOPERATIVE WOUND INFECTION OF LEFT HIP: Primary | ICD-10-CM

## 2024-12-15 DIAGNOSIS — K92.1 MELENA: ICD-10-CM

## 2024-12-15 DIAGNOSIS — G90.01 CAROTID SINUS SYNCOPE: ICD-10-CM

## 2024-12-15 LAB
ALBUMIN SERPL-MCNC: 3.3 G/DL (ref 3.5–5.2)
ALP SERPL-CCNC: 174 U/L (ref 35–104)
ALT SERPL-CCNC: 16 U/L (ref 10–35)
ANION GAP SERPL CALCULATED.3IONS-SCNC: 11 MMOL/L (ref 9–16)
AST SERPL-CCNC: 15 U/L (ref 10–35)
BASOPHILS # BLD: 0.06 K/UL (ref 0–0.2)
BASOPHILS NFR BLD: 1 % (ref 0–2)
BILIRUB SERPL-MCNC: <0.2 MG/DL (ref 0–1.2)
BUN SERPL-MCNC: 9 MG/DL (ref 6–20)
CALCIUM SERPL-MCNC: 9 MG/DL (ref 8.6–10.4)
CHLORIDE SERPL-SCNC: 101 MMOL/L (ref 98–107)
CO2 SERPL-SCNC: 25 MMOL/L (ref 20–31)
CREAT SERPL-MCNC: 0.5 MG/DL (ref 0.7–1.2)
EOSINOPHIL # BLD: 0.06 K/UL (ref 0–0.4)
EOSINOPHILS RELATIVE PERCENT: 1 % (ref 0–4)
ERYTHROCYTE [DISTWIDTH] IN BLOOD BY AUTOMATED COUNT: 16.7 % (ref 11.5–14.9)
GFR, ESTIMATED: >90 ML/MIN/1.73M2
GLUCOSE BLD-MCNC: 155 MG/DL (ref 65–105)
GLUCOSE BLD-MCNC: 175 MG/DL (ref 65–105)
GLUCOSE BLD-MCNC: 49 MG/DL (ref 65–105)
GLUCOSE SERPL-MCNC: 407 MG/DL (ref 74–99)
HCT VFR BLD AUTO: 35.9 % (ref 36–46)
HGB BLD-MCNC: 11.4 G/DL (ref 12–16)
LYMPHOCYTES NFR BLD: 1.55 K/UL (ref 1–4.8)
LYMPHOCYTES RELATIVE PERCENT: 25 % (ref 24–44)
MCH RBC QN AUTO: 24.8 PG (ref 26–34)
MCHC RBC AUTO-ENTMCNC: 31.9 G/DL (ref 31–37)
MCV RBC AUTO: 77.7 FL (ref 80–100)
MONOCYTES NFR BLD: 0.43 K/UL (ref 0.1–1.3)
MONOCYTES NFR BLD: 7 % (ref 1–7)
MORPHOLOGY: ABNORMAL
NEUTROPHILS NFR BLD: 66 % (ref 36–66)
NEUTS SEG NFR BLD: 4.1 K/UL (ref 1.3–9.1)
PLATELET # BLD AUTO: 317 K/UL (ref 150–450)
PMV BLD AUTO: 10.5 FL (ref 6–12)
POTASSIUM SERPL-SCNC: 3.9 MMOL/L (ref 3.7–5.3)
PROT SERPL-MCNC: 6.7 G/DL (ref 6.6–8.7)
RBC # BLD AUTO: 4.62 M/UL (ref 4–5.2)
SODIUM SERPL-SCNC: 137 MMOL/L (ref 136–145)
TROPONIN I SERPL HS-MCNC: 18 NG/L (ref 0–14)
WBC OTHER # BLD: 6.2 K/UL (ref 3.5–11)

## 2024-12-15 PROCEDURE — 73701 CT LOWER EXTREMITY W/DYE: CPT

## 2024-12-15 PROCEDURE — 96372 THER/PROPH/DIAG INJ SC/IM: CPT

## 2024-12-15 PROCEDURE — 96374 THER/PROPH/DIAG INJ IV PUSH: CPT

## 2024-12-15 PROCEDURE — 96376 TX/PRO/DX INJ SAME DRUG ADON: CPT

## 2024-12-15 PROCEDURE — 2580000003 HC RX 258: Performed by: INTERNAL MEDICINE

## 2024-12-15 PROCEDURE — 70450 CT HEAD/BRAIN W/O DYE: CPT

## 2024-12-15 PROCEDURE — 6360000002 HC RX W HCPCS: Performed by: NURSE PRACTITIONER

## 2024-12-15 PROCEDURE — 2580000003 HC RX 258: Performed by: STUDENT IN AN ORGANIZED HEALTH CARE EDUCATION/TRAINING PROGRAM

## 2024-12-15 PROCEDURE — 80053 COMPREHEN METABOLIC PANEL: CPT

## 2024-12-15 PROCEDURE — 36415 COLL VENOUS BLD VENIPUNCTURE: CPT

## 2024-12-15 PROCEDURE — 2060000000 HC ICU INTERMEDIATE R&B

## 2024-12-15 PROCEDURE — 6360000002 HC RX W HCPCS

## 2024-12-15 PROCEDURE — 72125 CT NECK SPINE W/O DYE: CPT

## 2024-12-15 PROCEDURE — 72131 CT LUMBAR SPINE W/O DYE: CPT

## 2024-12-15 PROCEDURE — 96375 TX/PRO/DX INJ NEW DRUG ADDON: CPT

## 2024-12-15 PROCEDURE — 6360000004 HC RX CONTRAST MEDICATION: Performed by: STUDENT IN AN ORGANIZED HEALTH CARE EDUCATION/TRAINING PROGRAM

## 2024-12-15 PROCEDURE — 2580000003 HC RX 258: Performed by: NURSE PRACTITIONER

## 2024-12-15 PROCEDURE — 87040 BLOOD CULTURE FOR BACTERIA: CPT

## 2024-12-15 PROCEDURE — 82947 ASSAY GLUCOSE BLOOD QUANT: CPT

## 2024-12-15 PROCEDURE — 6370000000 HC RX 637 (ALT 250 FOR IP): Performed by: STUDENT IN AN ORGANIZED HEALTH CARE EDUCATION/TRAINING PROGRAM

## 2024-12-15 PROCEDURE — 6360000002 HC RX W HCPCS: Performed by: STUDENT IN AN ORGANIZED HEALTH CARE EDUCATION/TRAINING PROGRAM

## 2024-12-15 PROCEDURE — 72128 CT CHEST SPINE W/O DYE: CPT

## 2024-12-15 PROCEDURE — 6370000000 HC RX 637 (ALT 250 FOR IP): Performed by: INTERNAL MEDICINE

## 2024-12-15 PROCEDURE — 84484 ASSAY OF TROPONIN QUANT: CPT

## 2024-12-15 PROCEDURE — 6360000002 HC RX W HCPCS: Performed by: INTERNAL MEDICINE

## 2024-12-15 PROCEDURE — 85025 COMPLETE CBC W/AUTO DIFF WBC: CPT

## 2024-12-15 PROCEDURE — 93005 ELECTROCARDIOGRAM TRACING: CPT | Performed by: STUDENT IN AN ORGANIZED HEALTH CARE EDUCATION/TRAINING PROGRAM

## 2024-12-15 PROCEDURE — 99285 EMERGENCY DEPT VISIT HI MDM: CPT

## 2024-12-15 RX ORDER — SODIUM CHLORIDE 9 MG/ML
INJECTION, SOLUTION INTRAVENOUS CONTINUOUS
Status: ACTIVE | OUTPATIENT
Start: 2024-12-15 | End: 2024-12-16

## 2024-12-15 RX ORDER — VANCOMYCIN 1 G/200ML
1000 INJECTION, SOLUTION INTRAVENOUS EVERY 24 HOURS
Status: DISCONTINUED | OUTPATIENT
Start: 2024-12-16 | End: 2024-12-15 | Stop reason: DRUGHIGH

## 2024-12-15 RX ORDER — MAGNESIUM SULFATE 1 G/100ML
1000 INJECTION INTRAVENOUS PRN
Status: DISCONTINUED | OUTPATIENT
Start: 2024-12-15 | End: 2024-12-19 | Stop reason: HOSPADM

## 2024-12-15 RX ORDER — GABAPENTIN 300 MG/1
300 CAPSULE ORAL 3 TIMES DAILY
Status: DISCONTINUED | OUTPATIENT
Start: 2024-12-15 | End: 2024-12-19 | Stop reason: HOSPADM

## 2024-12-15 RX ORDER — POTASSIUM CHLORIDE 1500 MG/1
40 TABLET, EXTENDED RELEASE ORAL PRN
Status: DISCONTINUED | OUTPATIENT
Start: 2024-12-15 | End: 2024-12-19 | Stop reason: HOSPADM

## 2024-12-15 RX ORDER — MORPHINE SULFATE 10 MG/ML
8 INJECTION, SOLUTION INTRAMUSCULAR; INTRAVENOUS ONCE
Status: COMPLETED | OUTPATIENT
Start: 2024-12-15 | End: 2024-12-15

## 2024-12-15 RX ORDER — IOPAMIDOL 755 MG/ML
75 INJECTION, SOLUTION INTRAVASCULAR
Status: COMPLETED | OUTPATIENT
Start: 2024-12-15 | End: 2024-12-15

## 2024-12-15 RX ORDER — LOPERAMIDE HYDROCHLORIDE 2 MG/1
2 CAPSULE ORAL 4 TIMES DAILY PRN
Status: DISCONTINUED | OUTPATIENT
Start: 2024-12-15 | End: 2024-12-19 | Stop reason: HOSPADM

## 2024-12-15 RX ORDER — FAMOTIDINE 20 MG/1
20 TABLET, FILM COATED ORAL ONCE
Status: COMPLETED | OUTPATIENT
Start: 2024-12-15 | End: 2024-12-15

## 2024-12-15 RX ORDER — ONDANSETRON 2 MG/ML
4 INJECTION INTRAMUSCULAR; INTRAVENOUS ONCE
Status: COMPLETED | OUTPATIENT
Start: 2024-12-15 | End: 2024-12-15

## 2024-12-15 RX ORDER — SODIUM CHLORIDE 0.9 % (FLUSH) 0.9 %
5-40 SYRINGE (ML) INJECTION EVERY 12 HOURS SCHEDULED
Status: DISCONTINUED | OUTPATIENT
Start: 2024-12-15 | End: 2024-12-19 | Stop reason: HOSPADM

## 2024-12-15 RX ORDER — PROMETHAZINE HYDROCHLORIDE 25 MG/1
25 TABLET ORAL EVERY 8 HOURS PRN
Status: DISCONTINUED | OUTPATIENT
Start: 2024-12-15 | End: 2024-12-19 | Stop reason: HOSPADM

## 2024-12-15 RX ORDER — CHOLESTYRAMINE LIGHT 4 G/5.7G
1 POWDER, FOR SUSPENSION ORAL DAILY
Status: DISCONTINUED | OUTPATIENT
Start: 2024-12-15 | End: 2024-12-19 | Stop reason: HOSPADM

## 2024-12-15 RX ORDER — ACETAMINOPHEN 500 MG
1000 TABLET ORAL 4 TIMES DAILY PRN
Status: DISCONTINUED | OUTPATIENT
Start: 2024-12-15 | End: 2024-12-19 | Stop reason: HOSPADM

## 2024-12-15 RX ORDER — VANCOMYCIN 1.5 G/300ML
1500 INJECTION, SOLUTION INTRAVENOUS EVERY 12 HOURS
Status: DISCONTINUED | OUTPATIENT
Start: 2024-12-16 | End: 2024-12-16

## 2024-12-15 RX ORDER — ACETAMINOPHEN 650 MG/1
650 SUPPOSITORY RECTAL EVERY 6 HOURS PRN
Status: DISCONTINUED | OUTPATIENT
Start: 2024-12-15 | End: 2024-12-19 | Stop reason: HOSPADM

## 2024-12-15 RX ORDER — POLYETHYLENE GLYCOL 3350 17 G/17G
17 POWDER, FOR SOLUTION ORAL DAILY PRN
Status: DISCONTINUED | OUTPATIENT
Start: 2024-12-15 | End: 2024-12-19 | Stop reason: HOSPADM

## 2024-12-15 RX ORDER — PRAVASTATIN SODIUM 40 MG
40 TABLET ORAL NIGHTLY
Status: DISCONTINUED | OUTPATIENT
Start: 2024-12-15 | End: 2024-12-19 | Stop reason: HOSPADM

## 2024-12-15 RX ORDER — SODIUM CHLORIDE 0.9 % (FLUSH) 0.9 %
10 SYRINGE (ML) INJECTION PRN
Status: DISCONTINUED | OUTPATIENT
Start: 2024-12-15 | End: 2024-12-19 | Stop reason: HOSPADM

## 2024-12-15 RX ORDER — 0.9 % SODIUM CHLORIDE 0.9 %
80 INTRAVENOUS SOLUTION INTRAVENOUS ONCE
Status: COMPLETED | OUTPATIENT
Start: 2024-12-15 | End: 2024-12-15

## 2024-12-15 RX ORDER — MIDODRINE HYDROCHLORIDE 2.5 MG/1
2.5 TABLET ORAL 2 TIMES DAILY PRN
Status: DISCONTINUED | OUTPATIENT
Start: 2024-12-15 | End: 2024-12-19 | Stop reason: HOSPADM

## 2024-12-15 RX ORDER — ONDANSETRON 4 MG/1
4 TABLET, ORALLY DISINTEGRATING ORAL EVERY 8 HOURS PRN
Status: DISCONTINUED | OUTPATIENT
Start: 2024-12-15 | End: 2024-12-19 | Stop reason: HOSPADM

## 2024-12-15 RX ORDER — INSULIN LISPRO 100 [IU]/ML
10 INJECTION, SOLUTION INTRAVENOUS; SUBCUTANEOUS ONCE
Status: COMPLETED | OUTPATIENT
Start: 2024-12-15 | End: 2024-12-15

## 2024-12-15 RX ORDER — PANTOPRAZOLE SODIUM 40 MG/1
40 TABLET, DELAYED RELEASE ORAL
Status: DISCONTINUED | OUTPATIENT
Start: 2024-12-16 | End: 2024-12-17

## 2024-12-15 RX ORDER — LANOLIN ALCOHOL/MO/W.PET/CERES
1000 CREAM (GRAM) TOPICAL DAILY
Status: DISCONTINUED | OUTPATIENT
Start: 2024-12-16 | End: 2024-12-19 | Stop reason: HOSPADM

## 2024-12-15 RX ORDER — ACETAMINOPHEN 325 MG/1
650 TABLET ORAL EVERY 6 HOURS PRN
Status: DISCONTINUED | OUTPATIENT
Start: 2024-12-15 | End: 2024-12-19 | Stop reason: HOSPADM

## 2024-12-15 RX ORDER — DILTIAZEM HYDROCHLORIDE 120 MG/1
120 CAPSULE, COATED, EXTENDED RELEASE ORAL DAILY
Status: DISCONTINUED | OUTPATIENT
Start: 2024-12-16 | End: 2024-12-19 | Stop reason: HOSPADM

## 2024-12-15 RX ORDER — ONDANSETRON 2 MG/ML
4 INJECTION INTRAMUSCULAR; INTRAVENOUS EVERY 6 HOURS PRN
Status: DISCONTINUED | OUTPATIENT
Start: 2024-12-15 | End: 2024-12-19 | Stop reason: HOSPADM

## 2024-12-15 RX ORDER — FONDAPARINUX SODIUM 10 MG/.8ML
10 INJECTION SUBCUTANEOUS DAILY
Status: DISCONTINUED | OUTPATIENT
Start: 2024-12-16 | End: 2024-12-16

## 2024-12-15 RX ORDER — LORAZEPAM 0.5 MG/1
0.5 TABLET ORAL EVERY 8 HOURS PRN
Status: DISCONTINUED | OUTPATIENT
Start: 2024-12-15 | End: 2024-12-19 | Stop reason: HOSPADM

## 2024-12-15 RX ORDER — DICYCLOMINE HYDROCHLORIDE 10 MG/1
10 CAPSULE ORAL
Status: DISCONTINUED | OUTPATIENT
Start: 2024-12-15 | End: 2024-12-19 | Stop reason: HOSPADM

## 2024-12-15 RX ORDER — SODIUM CHLORIDE 9 MG/ML
INJECTION, SOLUTION INTRAVENOUS PRN
Status: DISCONTINUED | OUTPATIENT
Start: 2024-12-15 | End: 2024-12-19 | Stop reason: HOSPADM

## 2024-12-15 RX ORDER — BUPROPION HYDROCHLORIDE 150 MG/1
150 TABLET ORAL EVERY MORNING
Status: DISCONTINUED | OUTPATIENT
Start: 2024-12-16 | End: 2024-12-19 | Stop reason: HOSPADM

## 2024-12-15 RX ORDER — LORAZEPAM 2 MG/ML
1 INJECTION INTRAMUSCULAR EVERY 4 HOURS PRN
Status: DISCONTINUED | OUTPATIENT
Start: 2024-12-15 | End: 2024-12-18

## 2024-12-15 RX ORDER — ENOXAPARIN SODIUM 100 MG/ML
40 INJECTION SUBCUTANEOUS DAILY
Status: DISCONTINUED | OUTPATIENT
Start: 2024-12-15 | End: 2024-12-16

## 2024-12-15 RX ORDER — POTASSIUM CHLORIDE 7.45 MG/ML
10 INJECTION INTRAVENOUS PRN
Status: DISCONTINUED | OUTPATIENT
Start: 2024-12-15 | End: 2024-12-19 | Stop reason: HOSPADM

## 2024-12-15 RX ADMIN — IOPAMIDOL 75 ML: 755 INJECTION, SOLUTION INTRAVENOUS at 15:15

## 2024-12-15 RX ADMIN — PRAVASTATIN SODIUM 40 MG: 40 TABLET ORAL at 23:42

## 2024-12-15 RX ADMIN — ENOXAPARIN SODIUM 40 MG: 100 INJECTION SUBCUTANEOUS at 23:43

## 2024-12-15 RX ADMIN — ONDANSETRON 4 MG: 2 INJECTION, SOLUTION INTRAMUSCULAR; INTRAVENOUS at 12:13

## 2024-12-15 RX ADMIN — INSULIN LISPRO 10 UNITS: 100 INJECTION, SOLUTION INTRAVENOUS; SUBCUTANEOUS at 16:50

## 2024-12-15 RX ADMIN — SODIUM CHLORIDE, PRESERVATIVE FREE 10 ML: 5 INJECTION INTRAVENOUS at 15:15

## 2024-12-15 RX ADMIN — CHOLESTYRAMINE 4 G: 4 POWDER, FOR SUSPENSION ORAL at 22:52

## 2024-12-15 RX ADMIN — HYDROMORPHONE HYDROCHLORIDE 0.5 MG: 1 INJECTION, SOLUTION INTRAMUSCULAR; INTRAVENOUS; SUBCUTANEOUS at 12:13

## 2024-12-15 RX ADMIN — SODIUM CHLORIDE 150 ML: 9 INJECTION, SOLUTION INTRAVENOUS at 23:36

## 2024-12-15 RX ADMIN — SODIUM CHLORIDE: 9 INJECTION, SOLUTION INTRAVENOUS at 21:00

## 2024-12-15 RX ADMIN — SODIUM CHLORIDE 80 ML: 9 INJECTION, SOLUTION INTRAVENOUS at 15:16

## 2024-12-15 RX ADMIN — MORPHINE SULFATE 8 MG: 10 INJECTION, SOLUTION INTRAMUSCULAR; INTRAVENOUS at 16:50

## 2024-12-15 RX ADMIN — PIPERACILLIN AND TAZOBACTAM 4500 MG: 4; .5 INJECTION, POWDER, FOR SOLUTION INTRAVENOUS at 23:55

## 2024-12-15 RX ADMIN — HYDROMORPHONE HYDROCHLORIDE 0.5 MG: 1 INJECTION, SOLUTION INTRAMUSCULAR; INTRAVENOUS; SUBCUTANEOUS at 21:05

## 2024-12-15 RX ADMIN — PROMETHAZINE HYDROCHLORIDE 12.5 MG: 25 INJECTION INTRAMUSCULAR; INTRAVENOUS at 23:36

## 2024-12-15 RX ADMIN — SODIUM CHLORIDE, PRESERVATIVE FREE 10 ML: 5 INJECTION INTRAVENOUS at 23:25

## 2024-12-15 RX ADMIN — SODIUM CHLORIDE 2500 MG: 9 INJECTION, SOLUTION INTRAVENOUS at 15:08

## 2024-12-15 RX ADMIN — ONDANSETRON 4 MG: 2 INJECTION, SOLUTION INTRAMUSCULAR; INTRAVENOUS at 16:50

## 2024-12-15 RX ADMIN — FAMOTIDINE 20 MG: 20 TABLET, FILM COATED ORAL at 12:12

## 2024-12-15 RX ADMIN — ONDANSETRON 4 MG: 2 INJECTION, SOLUTION INTRAMUSCULAR; INTRAVENOUS at 21:03

## 2024-12-15 RX ADMIN — HYDROMORPHONE HYDROCHLORIDE 0.5 MG: 1 INJECTION, SOLUTION INTRAMUSCULAR; INTRAVENOUS; SUBCUTANEOUS at 14:36

## 2024-12-15 RX ADMIN — LORAZEPAM 0.5 MG: 0.5 TABLET ORAL at 21:08

## 2024-12-15 RX ADMIN — DICYCLOMINE HYDROCHLORIDE 10 MG: 10 CAPSULE ORAL at 23:41

## 2024-12-15 RX ADMIN — GABAPENTIN 300 MG: 300 CAPSULE ORAL at 23:42

## 2024-12-15 ASSESSMENT — PAIN DESCRIPTION - DESCRIPTORS
DESCRIPTORS: SPASM
DESCRIPTORS: BURNING;SHARP;ACHING
DESCRIPTORS: BURNING;ACHING;SHARP

## 2024-12-15 ASSESSMENT — PAIN DESCRIPTION - ORIENTATION
ORIENTATION: LEFT
ORIENTATION: LEFT

## 2024-12-15 ASSESSMENT — PAIN SCALES - GENERAL
PAINLEVEL_OUTOF10: 7
PAINLEVEL_OUTOF10: 8
PAINLEVEL_OUTOF10: 8
PAINLEVEL_OUTOF10: 4

## 2024-12-15 ASSESSMENT — PAIN - FUNCTIONAL ASSESSMENT
PAIN_FUNCTIONAL_ASSESSMENT: PREVENTS OR INTERFERES SOME ACTIVE ACTIVITIES AND ADLS
PAIN_FUNCTIONAL_ASSESSMENT: PREVENTS OR INTERFERES SOME ACTIVE ACTIVITIES AND ADLS

## 2024-12-15 ASSESSMENT — PAIN DESCRIPTION - LOCATION
LOCATION: HIP;LEG
LOCATION: HIP
LOCATION: ABDOMEN

## 2024-12-15 ASSESSMENT — PAIN SCALES - WONG BAKER: WONGBAKER_NUMERICALRESPONSE: HURTS A LITTLE BIT

## 2024-12-15 NOTE — PROGRESS NOTES
[facility]  Basil Servin MD; Gigi Morales MD; Raul Heard MD; MD Madeline Jacobson MD; Wilner Ash MD  [facility] Internal Medicine   IN-PATIENT SERVICE  [facility]                 Date:   12/15/2024  Patientname:  Krista Chiang  Date of admission:  12/15/2024 10:45 AM  MRN:   [id number]  Account:  [id number]  YOB: 1980  PCP:    Arielle Melton APRN - NP  Room:   2118/2118-01  Code Status:    Full      Chief Complaint:     Chief Complaint   Patient presents with    Eye Problem    Head Injury    Fall     States vision went dark and lost balance and fell and hit her head. States she is on blood thinners, and currently being treated staph and strep in left hip wound. Left hip repaired in July of 2023       History of Present Illness:     Krista Chiang is a 44 y.o. Non- / non  female with history of alcohol abuse, Painting's esophagus, history of bariatric surgery, pulmonary embolism anticoagulated with fondaparinux, blood clots, hypertension, MRSA, pernicious anemia, SVT, and type 2 diabetes mellitus who presents with Eye Problem, Head Injury, and Fall (States vision went dark and lost balance and fell and hit her head. States she is on blood thinners, and currently being treated staph and strep in left hip wound. Left hip repaired in July of 2023)   and is admitted to the hospital for the management of Left hip postoperative wound infection.    According to the patient, she has been feeling unwell for about a week and has been experiencing nausea and vomiting since Tuesday since she started doxycycline for a hip infection. Today, she ramy from her easy chair, saw spots, and \"blacked out \".  She states that she hit her head on the ground.  She endorses that this has never happened to her before.  She has been in skilled nursing care since her past operation to remove infected hardware from her hip, and she has

## 2024-12-15 NOTE — ED PROVIDER NOTES
Other Topics Concern    Not on file   Social History Narrative    Lives with Jeremías ex  and daughter          Social Determinants of Health     Financial Resource Strain: Medium Risk (7/8/2024)    Received from [facility]    Overall Financial Resource Strain (CARDIA)     Difficulty of Paying Living Expenses: Somewhat hard   Food Insecurity: No Food Insecurity (8/19/2024)    Hunger Vital Sign     Worried About Running Out of Food in the Last Year: Never true     Ran Out of Food in the Last Year: Never true   Transportation Needs: No Transportation Needs (8/19/2024)    PRAPARE - Transportation     Lack of Transportation (Medical): No     Lack of Transportation (Non-Medical): No   Recent Concern: Transportation Needs - Unmet Transportation Needs (6/16/2024)    [facility]    PRAPARE - Transportation     Lack of Transportation (Medical): Yes     Lack of Transportation (Non-Medical): Yes   Physical Activity: Insufficiently Active (4/15/2021)    [facility]    Exercise Vital Sign     Days of Exercise per Week: 2 days     Minutes of Exercise per Session: 50 min   Stress: No Stress Concern Present (8/15/2024)    Received from [facility]    [employer] [place] [employer] - Occupational Stress Questionnaire     Feeling of Stress : Not at all   Social Connections: Moderately Integrated (7/8/2024)    Received from [facility]    Social Connection and Isolation Panel [NHANES]     Frequency of Communication with Friends and Family: More than three times a week     Frequency of Social Gatherings with Friends and Family: Three times a week     Attends Spiritism Services: More than 4 times per year     Active Member of Clubs or Organizations: Yes     Attends Club or Organization Meetings: More than 4 times per year     Marital Status:    Intimate Partner Violence: At Risk (7/27/2024)    Received  significant/life threatening deterioration in this patient's condition which required my urgent intervention.  Total critical care time was 30 minutes.  This excludes any time for separately reportable procedures.     FINAL IMPRESSION     1. Postoperative wound infection of left hip          DISPOSITION / PLAN   DISPOSITION Admitted 12/15/2024 04:54:56 PM               PATIENT REFERRED TO:  No follow-up provider specified.    DISCHARGE MEDICATIONS:  New Prescriptions    No medications on file       Gautam Lundy DO  Emergency Medicine Attending    (Please note that portions of this note were completed with a voice recognition program.  Efforts were made to edit the dictations but occasionally words are mis-transcribed.)          Gautam Lundy DO  12/15/24 2976

## 2024-12-16 ENCOUNTER — APPOINTMENT (OUTPATIENT)
Age: 44
DRG: 863 | End: 2024-12-16
Payer: COMMERCIAL

## 2024-12-16 LAB
DATE LAST DOSE: NORMAL
ECHO AO ROOT DIAM: 3.1 CM
ECHO AO ROOT INDEX: 1.48 CM/M2
ECHO AV AREA PEAK VELOCITY: 2 CM2
ECHO AV AREA VTI: 1.9 CM2
ECHO AV AREA/BSA PEAK VELOCITY: 1 CM2/M2
ECHO AV AREA/BSA VTI: 0.9 CM2/M2
ECHO AV MEAN GRADIENT: 6 MMHG
ECHO AV MEAN VELOCITY: 1.1 M/S
ECHO AV PEAK GRADIENT: 11 MMHG
ECHO AV PEAK VELOCITY: 1.6 M/S
ECHO AV VELOCITY RATIO: 0.69
ECHO AV VTI: 32.2 CM
ECHO BSA: 2.14 M2
ECHO EST RA PRESSURE: 3 MMHG
ECHO LA AREA 2C: 20.9 CM2
ECHO LA AREA 4C: 22.3 CM2
ECHO LA DIAMETER INDEX: 1.82 CM/M2
ECHO LA DIAMETER: 3.8 CM
ECHO LA MAJOR AXIS: 6.1 CM
ECHO LA MINOR AXIS: 5.9 CM
ECHO LA TO AORTIC ROOT RATIO: 1.23
ECHO LA VOL BP: 62 ML (ref 22–52)
ECHO LA VOL MOD A2C: 61 ML (ref 22–52)
ECHO LA VOL MOD A4C: 62 ML (ref 22–52)
ECHO LA VOL/BSA BIPLANE: 30 ML/M2 (ref 16–34)
ECHO LA VOLUME INDEX MOD A2C: 29 ML/M2 (ref 16–34)
ECHO LA VOLUME INDEX MOD A4C: 30 ML/M2 (ref 16–34)
ECHO LV E' LATERAL VELOCITY: 13.2 CM/S
ECHO LV E' SEPTAL VELOCITY: 9.68 CM/S
ECHO LV EDV A2C: 120 ML
ECHO LV EDV A4C: 123 ML
ECHO LV EDV INDEX A4C: 59 ML/M2
ECHO LV EDV NDEX A2C: 57 ML/M2
ECHO LV EF PHYSICIAN: 55 %
ECHO LV EJECTION FRACTION A2C: 58 %
ECHO LV EJECTION FRACTION A4C: 61 %
ECHO LV EJECTION FRACTION BIPLANE: 58 % (ref 55–100)
ECHO LV ESV A2C: 50 ML
ECHO LV ESV A4C: 48 ML
ECHO LV ESV INDEX A2C: 24 ML/M2
ECHO LV ESV INDEX A4C: 23 ML/M2
ECHO LV FRACTIONAL SHORTENING: 23 % (ref 28–44)
ECHO LV INTERNAL DIMENSION DIASTOLE INDEX: 2.49 CM/M2
ECHO LV INTERNAL DIMENSION DIASTOLIC: 5.2 CM (ref 3.9–5.3)
ECHO LV INTERNAL DIMENSION SYSTOLIC INDEX: 1.91 CM/M2
ECHO LV INTERNAL DIMENSION SYSTOLIC: 4 CM
ECHO LV IVSD: 1 CM (ref 0.6–0.9)
ECHO LV MASS 2D: 194.2 G (ref 67–162)
ECHO LV MASS INDEX 2D: 92.9 G/M2 (ref 43–95)
ECHO LV POSTERIOR WALL DIASTOLIC: 1 CM (ref 0.6–0.9)
ECHO LV RELATIVE WALL THICKNESS RATIO: 0.38
ECHO LVOT AREA: 3.1 CM2
ECHO LVOT AV VTI INDEX: 0.62
ECHO LVOT DIAM: 2 CM
ECHO LVOT MEAN GRADIENT: 2 MMHG
ECHO LVOT PEAK GRADIENT: 4 MMHG
ECHO LVOT PEAK VELOCITY: 1.1 M/S
ECHO LVOT STROKE VOLUME INDEX: 29.9 ML/M2
ECHO LVOT SV: 62.5 ML
ECHO LVOT VTI: 19.9 CM
ECHO MV A VELOCITY: 0.67 M/S
ECHO MV AREA VTI: 2.9 CM2
ECHO MV E DECELERATION TIME (DT): 144 MS
ECHO MV E VELOCITY: 0.72 M/S
ECHO MV E/A RATIO: 1.07
ECHO MV E/E' LATERAL: 5.45
ECHO MV E/E' RATIO (AVERAGED): 6.45
ECHO MV E/E' SEPTAL: 7.44
ECHO MV LVOT VTI INDEX: 1.07
ECHO MV MAX VELOCITY: 0.7 M/S
ECHO MV MEAN GRADIENT: 1 MMHG
ECHO MV MEAN VELOCITY: 0.5 M/S
ECHO MV PEAK GRADIENT: 2 MMHG
ECHO MV VTI: 21.2 CM
ECHO RA AREA 4C: 11.8 CM2
ECHO RA END SYSTOLIC VOLUME APICAL 4 CHAMBER INDEX BSA: 11 ML/M2
ECHO RA VOLUME: 24 ML
ECHO RIGHT VENTRICULAR SYSTOLIC PRESSURE (RVSP): 28 MMHG
ECHO RV BASAL DIMENSION: 2.9 CM
ECHO RV TAPSE: 2.2 CM (ref 1.7–?)
ECHO TV REGURGITANT MAX VELOCITY: 2.5 M/S
ECHO TV REGURGITANT PEAK GRADIENT: 25 MMHG
EKG ATRIAL RATE: 97 BPM
EKG P AXIS: 67 DEGREES
EKG P-R INTERVAL: 128 MS
EKG Q-T INTERVAL: 364 MS
EKG QRS DURATION: 78 MS
EKG QTC CALCULATION (BAZETT): 462 MS
EKG R AXIS: 57 DEGREES
EKG T AXIS: 28 DEGREES
EKG VENTRICULAR RATE: 97 BPM
GLUCOSE BLD-MCNC: 179 MG/DL (ref 65–105)
GLUCOSE BLD-MCNC: 260 MG/DL (ref 65–105)
GLUCOSE BLD-MCNC: 261 MG/DL (ref 65–105)
GLUCOSE BLD-MCNC: 298 MG/DL (ref 65–105)
GLUCOSE BLD-MCNC: 316 MG/DL (ref 65–105)
TME LAST DOSE: 345 H
VANCOMYCIN DOSE: 1500 MG
VANCOMYCIN SERPL-MCNC: 15.9 UG/ML (ref 5–40)

## 2024-12-16 PROCEDURE — 97166 OT EVAL MOD COMPLEX 45 MIN: CPT

## 2024-12-16 PROCEDURE — 6360000002 HC RX W HCPCS: Performed by: STUDENT IN AN ORGANIZED HEALTH CARE EDUCATION/TRAINING PROGRAM

## 2024-12-16 PROCEDURE — 80202 ASSAY OF VANCOMYCIN: CPT

## 2024-12-16 PROCEDURE — 97530 THERAPEUTIC ACTIVITIES: CPT

## 2024-12-16 PROCEDURE — 82947 ASSAY GLUCOSE BLOOD QUANT: CPT

## 2024-12-16 PROCEDURE — 2060000000 HC ICU INTERMEDIATE R&B

## 2024-12-16 PROCEDURE — 93306 TTE W/DOPPLER COMPLETE: CPT

## 2024-12-16 PROCEDURE — 2580000003 HC RX 258: Performed by: NURSE PRACTITIONER

## 2024-12-16 PROCEDURE — 97116 GAIT TRAINING THERAPY: CPT

## 2024-12-16 PROCEDURE — 2580000003 HC RX 258: Performed by: INTERNAL MEDICINE

## 2024-12-16 PROCEDURE — 6360000002 HC RX W HCPCS

## 2024-12-16 PROCEDURE — 97535 SELF CARE MNGMENT TRAINING: CPT

## 2024-12-16 PROCEDURE — 6370000000 HC RX 637 (ALT 250 FOR IP): Performed by: INTERNAL MEDICINE

## 2024-12-16 PROCEDURE — 93010 ELECTROCARDIOGRAM REPORT: CPT | Performed by: INTERNAL MEDICINE

## 2024-12-16 PROCEDURE — 6370000000 HC RX 637 (ALT 250 FOR IP)

## 2024-12-16 PROCEDURE — 36415 COLL VENOUS BLD VENIPUNCTURE: CPT

## 2024-12-16 PROCEDURE — 97162 PT EVAL MOD COMPLEX 30 MIN: CPT

## 2024-12-16 PROCEDURE — 6360000002 HC RX W HCPCS: Performed by: NURSE PRACTITIONER

## 2024-12-16 PROCEDURE — 99223 1ST HOSP IP/OBS HIGH 75: CPT | Performed by: INTERNAL MEDICINE

## 2024-12-16 PROCEDURE — 99221 1ST HOSP IP/OBS SF/LOW 40: CPT

## 2024-12-16 PROCEDURE — 6360000002 HC RX W HCPCS: Performed by: INTERNAL MEDICINE

## 2024-12-16 RX ORDER — VANCOMYCIN 1.75 G/350ML
1250 INJECTION, SOLUTION INTRAVENOUS EVERY 12 HOURS
Status: DISCONTINUED | OUTPATIENT
Start: 2024-12-16 | End: 2024-12-17

## 2024-12-16 RX ORDER — INSULIN GLARGINE 100 [IU]/ML
0.15 INJECTION, SOLUTION SUBCUTANEOUS DAILY
Status: DISCONTINUED | OUTPATIENT
Start: 2024-12-16 | End: 2024-12-19 | Stop reason: HOSPADM

## 2024-12-16 RX ORDER — OXYCODONE AND ACETAMINOPHEN 5; 325 MG/1; MG/1
1 TABLET ORAL EVERY 4 HOURS PRN
Status: DISCONTINUED | OUTPATIENT
Start: 2024-12-16 | End: 2024-12-17

## 2024-12-16 RX ORDER — FLUCONAZOLE 100 MG/1
200 TABLET ORAL DAILY
Status: DISCONTINUED | OUTPATIENT
Start: 2024-12-16 | End: 2024-12-19 | Stop reason: HOSPADM

## 2024-12-16 RX ORDER — FONDAPARINUX SODIUM 7.5 MG/.6ML
7.5 INJECTION SUBCUTANEOUS DAILY
Status: DISCONTINUED | OUTPATIENT
Start: 2024-12-17 | End: 2024-12-19 | Stop reason: HOSPADM

## 2024-12-16 RX ORDER — OXYCODONE HYDROCHLORIDE 5 MG/1
5 TABLET ORAL EVERY 4 HOURS PRN
Status: DISCONTINUED | OUTPATIENT
Start: 2024-12-16 | End: 2024-12-16

## 2024-12-16 RX ORDER — INSULIN LISPRO 100 [IU]/ML
0-8 INJECTION, SOLUTION INTRAVENOUS; SUBCUTANEOUS
Status: DISCONTINUED | OUTPATIENT
Start: 2024-12-16 | End: 2024-12-19 | Stop reason: HOSPADM

## 2024-12-16 RX ORDER — DEXTROSE MONOHYDRATE 100 MG/ML
INJECTION, SOLUTION INTRAVENOUS CONTINUOUS PRN
Status: DISCONTINUED | OUTPATIENT
Start: 2024-12-16 | End: 2024-12-16

## 2024-12-16 RX ORDER — DEXTROSE MONOHYDRATE 100 MG/ML
INJECTION, SOLUTION INTRAVENOUS CONTINUOUS PRN
Status: DISCONTINUED | OUTPATIENT
Start: 2024-12-16 | End: 2024-12-19 | Stop reason: HOSPADM

## 2024-12-16 RX ORDER — INSULIN LISPRO 100 [IU]/ML
0-4 INJECTION, SOLUTION INTRAVENOUS; SUBCUTANEOUS
Status: DISCONTINUED | OUTPATIENT
Start: 2024-12-16 | End: 2024-12-16

## 2024-12-16 RX ADMIN — DILTIAZEM HYDROCHLORIDE 120 MG: 120 CAPSULE, COATED, EXTENDED RELEASE ORAL at 07:52

## 2024-12-16 RX ADMIN — BUPROPION HYDROCHLORIDE 150 MG: 150 TABLET, EXTENDED RELEASE ORAL at 10:17

## 2024-12-16 RX ADMIN — OXYCODONE HYDROCHLORIDE 5 MG: 5 TABLET ORAL at 16:08

## 2024-12-16 RX ADMIN — INSULIN LISPRO 2 UNITS: 100 INJECTION, SOLUTION INTRAVENOUS; SUBCUTANEOUS at 03:44

## 2024-12-16 RX ADMIN — VANCOMYCIN 1500 MG: 1.5 INJECTION, SOLUTION INTRAVENOUS at 03:45

## 2024-12-16 RX ADMIN — GABAPENTIN 300 MG: 300 CAPSULE ORAL at 07:52

## 2024-12-16 RX ADMIN — OXYCODONE HYDROCHLORIDE 5 MG: 5 TABLET ORAL at 11:37

## 2024-12-16 RX ADMIN — PIPERACILLIN AND TAZOBACTAM 3375 MG: 3; .375 INJECTION, POWDER, LYOPHILIZED, FOR SOLUTION INTRAVENOUS at 03:04

## 2024-12-16 RX ADMIN — OXYCODONE HYDROCHLORIDE 5 MG: 5 TABLET ORAL at 20:23

## 2024-12-16 RX ADMIN — PIPERACILLIN AND TAZOBACTAM 3375 MG: 3; .375 INJECTION, POWDER, LYOPHILIZED, FOR SOLUTION INTRAVENOUS at 11:38

## 2024-12-16 RX ADMIN — LORAZEPAM 0.5 MG: 0.5 TABLET ORAL at 07:52

## 2024-12-16 RX ADMIN — VANCOMYCIN 1250 MG: 1.75 INJECTION, SOLUTION INTRAVENOUS at 17:16

## 2024-12-16 RX ADMIN — PIPERACILLIN AND TAZOBACTAM 3375 MG: 3; .375 INJECTION, POWDER, LYOPHILIZED, FOR SOLUTION INTRAVENOUS at 18:21

## 2024-12-16 RX ADMIN — PANTOPRAZOLE SODIUM 40 MG: 40 TABLET, DELAYED RELEASE ORAL at 05:55

## 2024-12-16 RX ADMIN — HYDROMORPHONE HYDROCHLORIDE 0.5 MG: 1 INJECTION, SOLUTION INTRAMUSCULAR; INTRAVENOUS; SUBCUTANEOUS at 22:23

## 2024-12-16 RX ADMIN — PROMETHAZINE HYDROCHLORIDE 25 MG: 25 TABLET ORAL at 20:23

## 2024-12-16 RX ADMIN — PROMETHAZINE HYDROCHLORIDE 12.5 MG: 25 INJECTION INTRAMUSCULAR; INTRAVENOUS at 10:21

## 2024-12-16 RX ADMIN — MIDODRINE HYDROCHLORIDE 2.5 MG: 2.5 TABLET ORAL at 23:32

## 2024-12-16 RX ADMIN — HYDROMORPHONE HYDROCHLORIDE 0.5 MG: 1 INJECTION, SOLUTION INTRAMUSCULAR; INTRAVENOUS; SUBCUTANEOUS at 10:16

## 2024-12-16 RX ADMIN — PRAVASTATIN SODIUM 40 MG: 40 TABLET ORAL at 20:23

## 2024-12-16 RX ADMIN — DICYCLOMINE HYDROCHLORIDE 10 MG: 10 CAPSULE ORAL at 05:55

## 2024-12-16 RX ADMIN — HYDROMORPHONE HYDROCHLORIDE 0.5 MG: 1 INJECTION, SOLUTION INTRAMUSCULAR; INTRAVENOUS; SUBCUTANEOUS at 18:17

## 2024-12-16 RX ADMIN — GABAPENTIN 300 MG: 300 CAPSULE ORAL at 20:23

## 2024-12-16 RX ADMIN — ONDANSETRON 4 MG: 2 INJECTION, SOLUTION INTRAMUSCULAR; INTRAVENOUS at 14:15

## 2024-12-16 RX ADMIN — Medication 2000 UNITS: at 10:17

## 2024-12-16 RX ADMIN — CHOLESTYRAMINE 4 G: 4 POWDER, FOR SUSPENSION ORAL at 07:52

## 2024-12-16 RX ADMIN — DICYCLOMINE HYDROCHLORIDE 10 MG: 10 CAPSULE ORAL at 11:37

## 2024-12-16 RX ADMIN — ACETAMINOPHEN 1000 MG: 500 TABLET ORAL at 20:47

## 2024-12-16 RX ADMIN — HYDROMORPHONE HYDROCHLORIDE 0.5 MG: 1 INJECTION, SOLUTION INTRAMUSCULAR; INTRAVENOUS; SUBCUTANEOUS at 14:18

## 2024-12-16 RX ADMIN — INSULIN GLARGINE 14 UNITS: 100 INJECTION, SOLUTION SUBCUTANEOUS at 11:36

## 2024-12-16 RX ADMIN — INSULIN LISPRO 4 UNITS: 100 INJECTION, SOLUTION INTRAVENOUS; SUBCUTANEOUS at 17:14

## 2024-12-16 RX ADMIN — CYANOCOBALAMIN TAB 1000 MCG 1000 MCG: 1000 TAB at 10:17

## 2024-12-16 RX ADMIN — FLUOXETINE HYDROCHLORIDE 40 MG: 20 CAPSULE ORAL at 07:52

## 2024-12-16 RX ADMIN — HYDROMORPHONE HYDROCHLORIDE 0.5 MG: 1 INJECTION, SOLUTION INTRAMUSCULAR; INTRAVENOUS; SUBCUTANEOUS at 05:56

## 2024-12-16 RX ADMIN — FLUCONAZOLE 200 MG: 100 TABLET ORAL at 21:23

## 2024-12-16 RX ADMIN — INSULIN LISPRO 4 UNITS: 100 INJECTION, SOLUTION INTRAVENOUS; SUBCUTANEOUS at 11:37

## 2024-12-16 RX ADMIN — LORAZEPAM 1 MG: 2 INJECTION INTRAMUSCULAR; INTRAVENOUS at 21:27

## 2024-12-16 RX ADMIN — SODIUM CHLORIDE, PRESERVATIVE FREE 10 ML: 5 INJECTION INTRAVENOUS at 20:41

## 2024-12-16 RX ADMIN — DICYCLOMINE HYDROCHLORIDE 10 MG: 10 CAPSULE ORAL at 20:23

## 2024-12-16 RX ADMIN — INSULIN LISPRO 6 UNITS: 100 INJECTION, SOLUTION INTRAVENOUS; SUBCUTANEOUS at 20:25

## 2024-12-16 RX ADMIN — FONDAPARINUX SODIUM 10 MG: 10 INJECTION, SOLUTION SUBCUTANEOUS at 08:14

## 2024-12-16 RX ADMIN — DICYCLOMINE HYDROCHLORIDE 10 MG: 10 CAPSULE ORAL at 17:14

## 2024-12-16 RX ADMIN — GABAPENTIN 300 MG: 300 CAPSULE ORAL at 14:15

## 2024-12-16 RX ADMIN — HYDROMORPHONE HYDROCHLORIDE 0.5 MG: 1 INJECTION, SOLUTION INTRAMUSCULAR; INTRAVENOUS; SUBCUTANEOUS at 02:05

## 2024-12-16 RX ADMIN — SODIUM CHLORIDE: 9 INJECTION, SOLUTION INTRAVENOUS at 14:14

## 2024-12-16 RX ADMIN — OXYCODONE HYDROCHLORIDE AND ACETAMINOPHEN 1 TABLET: 5; 325 TABLET ORAL at 23:32

## 2024-12-16 ASSESSMENT — PAIN DESCRIPTION - ORIENTATION
ORIENTATION: LEFT

## 2024-12-16 ASSESSMENT — PAIN DESCRIPTION - DESCRIPTORS
DESCRIPTORS: ACHING;BURNING;SHARP
DESCRIPTORS: ACHING
DESCRIPTORS: ACHING;BURNING;SHARP
DESCRIPTORS: ACHING;BURNING;SHARP;SHOOTING;STABBING
DESCRIPTORS: ACHING
DESCRIPTORS: ACHING;BURNING;SPASM;SHARP
DESCRIPTORS: ACHING
DESCRIPTORS: ACHING;BURNING;STABBING
DESCRIPTORS: ACHING
DESCRIPTORS: SHARP;BURNING;ACHING

## 2024-12-16 ASSESSMENT — PAIN SCALES - GENERAL
PAINLEVEL_OUTOF10: 7
PAINLEVEL_OUTOF10: 8
PAINLEVEL_OUTOF10: 6
PAINLEVEL_OUTOF10: 7
PAINLEVEL_OUTOF10: 7
PAINLEVEL_OUTOF10: 6
PAINLEVEL_OUTOF10: 5
PAINLEVEL_OUTOF10: 3
PAINLEVEL_OUTOF10: 4
PAINLEVEL_OUTOF10: 8
PAINLEVEL_OUTOF10: 8
PAINLEVEL_OUTOF10: 7
PAINLEVEL_OUTOF10: 6
PAINLEVEL_OUTOF10: 6
PAINLEVEL_OUTOF10: 7
PAINLEVEL_OUTOF10: 5
PAINLEVEL_OUTOF10: 7
PAINLEVEL_OUTOF10: 7

## 2024-12-16 ASSESSMENT — PAIN DESCRIPTION - LOCATION
LOCATION: HIP
LOCATION: ABDOMEN;HIP

## 2024-12-16 ASSESSMENT — PAIN DESCRIPTION - PAIN TYPE
TYPE: CHRONIC PAIN
TYPE: CHRONIC PAIN

## 2024-12-16 ASSESSMENT — PAIN - FUNCTIONAL ASSESSMENT
PAIN_FUNCTIONAL_ASSESSMENT: PREVENTS OR INTERFERES SOME ACTIVE ACTIVITIES AND ADLS

## 2024-12-16 ASSESSMENT — PAIN DESCRIPTION - FREQUENCY
FREQUENCY: CONTINUOUS
FREQUENCY: CONTINUOUS

## 2024-12-16 ASSESSMENT — PAIN SCALES - WONG BAKER: WONGBAKER_NUMERICALRESPONSE: HURTS A LITTLE BIT

## 2024-12-16 ASSESSMENT — PAIN DESCRIPTION - ONSET
ONSET: ON-GOING
ONSET: ON-GOING

## 2024-12-16 NOTE — PROGRESS NOTES
Comprehensive Nutrition Assessment    Type and Reason for Visit:  Positive nutrition screen (wt loss)    Nutrition Recommendations/Plan:   Will provide 4 carbohydrate choices per tray and Ensure High Protein twice daily     Malnutrition Assessment:  Malnutrition Status:  At risk for malnutrition (12/16/24 1633)    Context:  Chronic Illness     Findings of the 6 clinical characteristics of malnutrition:  Energy Intake:  No decrease in energy intake  Weight Loss:  No weight loss     Body Fat Loss:  Unable to assess     Muscle Mass Loss:  Unable to assess    Fluid Accumulation:  No fluid accumulation     Strength:  Not Performed    Nutrition Assessment:    Pt admitted due to Wound Infection of hip. Pt known to this service and usually eats fair amounts/takes supplements. Review of wt history shows wt is up from  August admit.    Nutrition Related Findings:    no edema, Labs: POC Glu 298, Meds: Reviewed, PMH: ETOH, GERD, Bariatric Surgery Wound Type: Surgical Incision       Current Nutrition Intake & Therapies:    Average Meal Intake: %     ADULT DIET; Regular; 4 carb choices (60 gm/meal)  ADULT ORAL NUTRITION SUPPLEMENT; Breakfast, Dinner; Low Calorie/High Protein Oral Supplement    Anthropometric Measures:  Height: 172.7 cm (5' 7.99\")  Ideal Body Weight (IBW): 140 lbs (64 kg)    Admission Body Weight: 95.3 kg (210 lb) (stated)  Current Body Weight: 102 kg (224 lb 13.9 oz) (obatined by RD), 160.6 % IBW. Weight Source: Bed scale  Current BMI (kg/m2): 34.2  Usual Body Weight: 98 kg (216 lb)     % Weight Change (Calculated): 4.1                    BMI Categories: Obese Class 1 (BMI 30.0-34.9)    Estimated Daily Nutrient Needs:  Energy Requirements Based On: Formula  Weight Used for Energy Requirements: Current  Energy (kcal/day): [place] x 1-1.1= 3104-6608 kcal  Weight Used for Protein Requirements: Current  Protein (g/day): 1.3g/kg= 135 g  Method Used for Fluid Requirements: 1 ml/kcal  Fluid (ml/day): 0155-5475  ml    Nutrition Diagnosis:   Increased nutrient needs related to  (healing) as evidenced by wounds    Nutrition Interventions:   Food and/or Nutrient Delivery: Continue Current Diet, Modify Oral Nutrition Supplement  Nutrition Education/Counseling: No recommendation at this time  Coordination of Nutrition Care: Continue to monitor while inpatient       Goals:  Goals: PO intake 50% or greater  Type of Goal: New goal  Previous Goal Met: New Goal    Nutrition Monitoring and Evaluation:      Food/Nutrient Intake Outcomes: Food and Nutrient Intake, Supplement Intake  Physical Signs/Symptoms Outcomes: Biochemical Data, GI Status, Fluid Status or Edema, Skin, Weight    Discharge Planning:    Continue current diet     Jenn Moran RD, LD  Contact: [phone]

## 2024-12-16 NOTE — PROGRESS NOTES
EHSAN Coelho notified phleb have been unable to obtain the protime INR lab dt poor access. Asked if we could obtain a line dt poor access for line draws

## 2024-12-16 NOTE — FLOWSHEET NOTE
12/16/24 0623   Treatment Team Notification   Reason for Communication Review case  (New pt consult: infection)   Name of Team Member Notified Dr. Flannery   Treatment Team Role Consulting Provider   Method of Communication Secure Message   Response In department   Notification Time 1356

## 2024-12-16 NOTE — PROGRESS NOTES
[facility]   Occupational Therapy Evaluation  Date: 24  Patient Name: Krista Chiang       Room: 2118/2118-01  MRN: [id number]  Account: [id number]   : 1980  (44 y.o.) Gender: female     Discharge Recommendations:  Further Occupational Therapy is recommended upon facility discharge.      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 encounter, Constipation, Depression, Fibromyalgia, Genital herpes, unspecified, GERD (gastroesophageal reflux disease), H/O bariatric surgery, History of pulmonary embolism, Hx of blood clots, Hypertension, Lives in nursing home, Lumbosacral spondylosis without myelopathy, MDRO (multiple drug resistant organisms) resistance, Mobility impaired, MRSA (methicillin resistant staph aureus) culture positive, Muscle weakness, Obsessive-compulsive disorder, unspecified, Opioid abuse, in remission ([facility]), Pernicious anemia, Polyneuropathy, unspecified, PTSD (post-traumatic stress disorder), Pulmonary embolism ([facility]), Suicidal behavior, SVT (supraventricular tachycardia) ([facility]), Type 2 diabetes mellitus, with long-term current use of insulin ([facility]), Under care of team, and Under care of team.    Past Surgical History:   has a past surgical history that includes Cholecystectomy; Liver Resection (); Tonsillectomy; Abdominal hernia repair (); Abdominal hernia repair (2014); Bariatric Surgery (2013); Dilation and curettage of uterus (); Finger amputation (Left, 2015); lymph node biopsy (); Total

## 2024-12-16 NOTE — CARE COORDINATION
Case Management Assessment  Initial Evaluation    Date/Time of Evaluation: 12/16/2024 1:09 PM  Assessment Completed by: Lynne Lui RN    If patient is discharged prior to next notation, then this note serves as note for discharge by case management.    Patient Name: Krista Chiang                   YOB: 1980  Diagnosis: Left hip postoperative wound infection [T81.49XA]  Postoperative wound infection of left hip [T81.49XA]                   Date / Time: 12/15/2024 10:45 AM    Patient Admission Status: Inpatient   Readmission Risk (Low < 19, Mod (19-27), High > 27): Readmission Risk Score: 18.4    Current PCP: Arielle Melton APRN - NP  PCP verified by CM? Yes    Chart Reviewed: Yes      History Provided by: Patient  Patient Orientation: Alert and Oriented    Patient Cognition: Alert    Hospitalization in the last 30 days (Readmission):  No    If yes, Readmission Assessment in CM Navigator will be completed.    Advance Directives:      Code Status: Full Code   Patient's Primary Decision Maker is: Legal Next of Kin    Primary Decision Maker: Naima Grant - Parent - [phone]    Secondary Decision Maker: Zachery Grant - Brother/Sister - [phone]    Discharge Planning:    Patient lives with: Alone Type of Home: Trailer/Mobile Home  Primary Care Giver: Self  Patient Support Systems include: Parent, Family Members   Current Financial resources: Medicaid, Medicare  Current community resources: (S) Other (Comment), Transportation (states has [facility] but requested [facility] at last PCP appt)  Current services prior to admission: Durable Medical Equipment, Home Care            Current DME: Walker, Wheelchair, Glucometer, Shower Chair, Bedside Commode            Type of Home Care services:  OT, PT, Nursing Services    ADLS  Prior functional level: Independent in ADLs/IADLs, Assistance with the following:, Bathing, Shopping  Current functional level: Independent in ADLs/IADLs, Assistance with  the following:, Bathing, Shopping    PT AM-PAC:   /24  OT AM-PAC:   /24    Family can provide assistance at DC: No  Would you like Case Management to discuss the discharge plan with any other family members/significant others, and if so, who? No  Plans to Return to Present Housing: Yes  Other Identified Issues/Barriers to RETURNING to current housing: no barriers   Potential Assistance needed at discharge: Transportation, Home Care            Potential DME:  none  Patient expects to discharge to: Trailer/mobile home  Plan for transportation at discharge: Cab    Financial    Payor: [employer] [facility] DUAL / Plan: [employer] BETTER HEALTH [facility] DUAL / Product Type: *No Product type* /     Does insurance require precert for SNF: Yes    Potential assistance Purchasing Medications: No  Meds-to-Beds request:  no      [id number] - [place], [place] - [address] [facility] [phone] - F [phone]  [address] [place] [address]  Phone: [phone] Fax: [phone]      Notes:    Factors facilitating achievement of predicted outcomes: Family support, Cooperative, Pleasant, Good insight into deficits, Has needed Durable Medical Equipment at home, and Knowledge about rehab    Barriers to discharge: Long standing deficits and Medication managment    Additional Case Management Notes: 12/16/2024 [employer] BETTER DUAL from mobile home alone, ramp, medical transport (needs assistance to set up) DME walker, wc, sc, bsc, glucometer; VNS Unique-past, requested [facility]-accepted; ID consult, IV zosyn/vanco, echo pending, Ortho consult, PT/OT; [place] 18%, HONG needs signed/completed    TRANSPORTATION INFORMATION PROVIDED FOR MEDICAL TRANSPORTS.    LSW to follow with Eugenio.    The Plan for Transition of Care is related to the following treatment goals of Left hip postoperative wound infection [T81.49XA]  Postoperative wound infection of left hip [T81.49XA]    IF APPLICABLE: The Patient and/or patient representative

## 2024-12-16 NOTE — PROGRESS NOTES
Spiritual Health History and Assessment/Progress Note  [facility]    (P) Spiritual/Emotional Needs,  ,  ,      Name: Krista Chiang MRN: [id number]    Age: 44 y.o.     Sex: female   Language: English   Christian: [employer] [place]   Postoperative wound infection of left hip     Date: 12/16/2024            Total Time Calculated: (P) 15 min           Pt's daughter Opal came in as writer was talking with pt; daughter is home from college on break and pt is anxious to get home to be with daughter. Pt shared medical status and plan of care to get rid of infection. She also talked about her many hospitalizations and future needs as well. Pt appeared to be in good spirits and appreciated visit and blessing.        Spiritual Assessment began in [facility] PRO[facility]ESS[facility]ARE        Referral/Consult From: (P) Rounding   Encounter Overview/Reason: (P) Spiritual/Emotional Needs  Service Provided For: (P) Patient and family together    Amy, Belief, Meaning:   Patient identifies as spiritual  Family/Friends Other: not discussed      Importance and Influence:  Patient has no beliefs influential to healthcare decision-making identified during this visit  Family/Friends Other: not discussed    Community:  Patient feels well-supported. Support system includes: Parent/s and Children  Family/Friends Other: not discussed    Assessment and Plan of Care:     Patient Interventions include: Facilitated expression of thoughts and feelings, Explored spiritual coping/struggle/distress, Affirmed coping skills/support systems, and Provided sacramental/Anglican ritual  Family/Friends Interventions include: Affirmed coping skills/support systems and Provided sacramental/Anglican ritual    Patient Plan of Care: Spiritual Care available upon further referral  Family/Friends Plan of Care: No spiritual needs identified for follow-up    Electronically signed by Chaplain Moises on 12/16/2024 at 6:04 PM

## 2024-12-16 NOTE — DISCHARGE INSTR - COC
Continuity of Care Form    Patient Name: Krista Chiang   :  1980  MRN:  [id number]    Admit date:  12/15/2024  Discharge date:  ***    Code Status Order: Full Code   Advance Directives:   Advance Care Flowsheet Documentation             Admitting Physician:  Wilner Ash MD  PCP: Arielle Melton, APRN - NP    Discharging Nurse: ***  Discharging Hospital Unit/Room#: 8/8-01  Discharging Unit Phone Number: ***    Emergency Contact:   Extended Emergency Contact Information  Primary Emergency Contact: Naima Grant  Address: [address] [address] [facility]  Home Phone: [phone]  Mobile Phone: [phone]  Relation: Parent  Secondary Emergency Contact: Zachery Grant  Home Phone: [phone]  Mobile Phone: [phone]  Relation: Brother/Sister    Past Surgical History:  Past Surgical History:   Procedure Laterality Date    ABDOMINAL HERNIA REPAIR      incisional hernia repair, complicated by infection, had multiple surgeries for this    ABDOMINAL HERNIA REPAIR  2014    ANKLE FRACTURE SURGERY Left     2023  HIP IRRIGATION AND DEBRIDEMENT AND PLACEMENT OF ANTIBIOTIC IMPREGNATED CEMENT BEADS performed by Dipak Traore DO at [facility] OR    ANKLE FRACTURE SURGERY Left 10/24/2023    IRRIGATION AND DEBRIDEMENT OF LEFT HIP WITH CLOSURE performed by Dipak Traore DO at [facility] OR    ANKLE SURGERY Left 2019    ligament    ANKLE SURGERY Left 2023    IRRIGATION AND DEBRIDEMENT HIP (IRRISEPT, CELLERATE) performed by Dipak Traore DO at [facility] OR    BARIATRIC SURGERY  2013    [facility]     SECTION      CHOLECYSTECTOMY      DILATION AND CURETTAGE OF UTERUS  2005    ESOPHAGOGASTRODUODENOSCOPY  2021    ESOPHAGOGASTRODUODENOSCOPY  2013    FINGER AMPUTATION Left 2015    index and ring finger. from [facility] CATH POWER PICC SINGLE  2023    HIP SURGERY Left     2023 HIP  ***    Intake/Output Summary (Last 24 hours) at 12/16/2024 1137  Last data filed at 12/16/2024 0744  Gross per 24 hour   Intake 1305.6 ml   Output --   Net 1305.6 ml     I/O last 3 completed shifts:  In: 400 [I.V.:100; IV Piggyback:300]  Out: -     Safety Concerns:     { HONG Safety Concerns:806257259}    Impairments/Disabilities:      { HONG Impairments/Disabilities:177208968}    Nutrition Therapy:  Current Nutrition Therapy:   { HONG Diet List:669575167}    Routes of Feeding: {[facility] DME Other Feedings:771136236}  Liquids: {Slp liquid thickness:16737}  Daily Fluid Restriction: {[facility] DME Yes amt example:486788405}  Last Modified Barium Swallow with Video (Video Swallowing Test): {Done Not Done Date:304088012}    Treatments at the Time of Hospital Discharge:   Respiratory Treatments: ***  Oxygen Therapy:  {Therapy; copd oxygen:06995}  Ventilator:    { CC Vent List:326541583}    Rehab Therapies: Physical Therapy and Occupational Therapy  Weight Bearing Status/Restrictions: No weight bearing restrictions  Other Medical Equipment (for information only, NOT a DME order):  wheelchair, walker, bath bench, and bedside commode  Other Treatments: Skilled Nursing assessment and monitoring. Medication education and monitoring per protocol.      LSW to assist with needs. Please evaluate for aide services.     Patient's personal belongings (please select all that are sent with patient):  {[facility] DME Belongings:555985170}    RN SIGNATURE:  {Esignature:061548123}    CASE MANAGEMENT/SOCIAL WORK SECTION    Inpatient Status Date: 12/15/2024    Readmission Risk Assessment Score:  [facility] RISK OF UNPLANNED READMISSION 2.0             18.4 Total Score        Discharging to Facility/ Agency   [facility]  [address]  [facility] [address]  Phone [phone]  Fax  [phone]     Dialysis Facility (if applicable)   Name:  Address:  Dialysis Schedule:  Phone:  Fax:    / signature: Electronically

## 2024-12-16 NOTE — PROGRESS NOTES
Pt alert, oriented. Pt assisted into bed and vitals obtained. Pt urinated on self. Pt cleaned up, new gown applied and telemetry applied. Waffle mattress applied to bed and inflated. Pt given call light. To give report to oncoming shift.     S Plasty Text: Given the location and shape of the defect, and the orientation of relaxed skin tension lines, an S-plasty was deemed most appropriate for repair.  Using a sterile surgical marker, the appropriate outline of the S-plasty was drawn, incorporating the defect and placing the expected incisions within the relaxed skin tension lines where possible.  The area thus outlined was incised deep to adipose tissue with a #15 scalpel blade.  The skin margins were undermined to an appropriate distance in all directions utilizing iris scissors. The skin flaps were advanced over the defect.  The opposing margins were then approximated with interrupted buried subcutaneous sutures.

## 2024-12-16 NOTE — PLAN OF CARE
Problem: Chronic Conditions and Co-morbidities  Goal: Patient's chronic conditions and co-morbidity symptoms are monitored and maintained or improved  12/16/2024 1638 by Daphnie Joshi RN  Outcome: Progressing     Problem: Discharge Planning  Goal: Discharge to home or other facility with appropriate resources  12/16/2024 1638 by Daphnie Joshi RN  Outcome: Progressing     Problem: Pain  Goal: Verbalizes/displays adequate comfort level or baseline comfort level  12/16/2024 1638 by Daphnie Joshi RN  Outcome: Progressing

## 2024-12-16 NOTE — H&P
[facility] Internal Medicine  Raul Heard MD; Ezio Conway MD, Gigi Morales MD, Crystal Estrada MD,   Madeline Hutchison MD; Wilner Ash MD, Carli Mendoza MD.    [facility] Internal Medicine   IN-PATIENT SERVICE   [facility]    HISTORY AND PHYSICAL EXAMINATION            Date:   12/16/2024  Patient name:  Krista Chiang  Date of admission:  12/15/2024 10:45 AM  MRN:   [id number]  Account:  [id number]  YOB: 1980  PCP:    Arielle Melton APRN - NP  Room:   2118/2118-01  Code Status:    Full Code    Chief Complaint:     Chief Complaint   Patient presents with    Eye Problem    Head Injury    Fall     States vision went dark and lost balance and fell and hit her head. States she is on blood thinners, and currently being treated staph and strep in left hip wound. Left hip repaired in July of 2023       History Obtained From:     Patient/EMR/Bedside RN    History of Present Illness:     Krista Chiang is a 44 y.o. Non- / non  female with history of alcohol abuse, Painting's esophagus, history of bariatric surgery, pulmonary embolism anticoagulated with fondaparinux, blood clots, hypertension, MRSA, pernicious anemia, SVT, and type 2 diabetes mellitus who presents with Eye Problem, Head Injury, and Fall (States vision went dark and lost balance and fell and hit her head. States she is on blood thinners, and currently being treated staph and strep in left hip wound. Left hip repaired in July of 2023)   and is admitted to the hospital for the management of Left hip postoperative wound infection.     According to the patient, she has been feeling unwell for about a week and has been experiencing nausea and vomiting since Tuesday since she started doxycycline for a hip infection. Today, she ramy from her easy chair, saw spots, and \"blacked out \".  She states that she hit her head on the ground.  She endorses that this has never happened to  OR    HIP SURGERY Left 11/26/2023    HIP SURGICAL SITE WOUND (DEHISCENCE) IRRIGATION AND DEBRIDEMENT    HIP SURGERY Left 11/26/2023    HIP SURGICAL SITE WOUND (DEHISCENCE) IRRIGATION AND DEBRIDEMENT performed by Dipak Traore DO at [facility] OR    HIP SURGERY Left     IRRIGATION AND DEBRIDEMENT HIP (IRRISEPT, CELLERATE) - Left    IVC FILTER INSERTION      LAPAROSCOPY  05/22/2013    LEISA    LIVER RESECTION  2010    for hepatic adenoma    LYMPH NODE BIOPSY  1990    JUSTINE AND BSO (CERVIX REMOVED)  2011    TONSILLECTOMY      XR MIDLINE EQUAL OR GREATER THAN 5 YEARS  4/24/2013    XR MIDLINE EQUAL OR GREATER THAN 5 YEARS        Medications Prior to Admission:     Prior to Admission medications    Medication Sig Start Date End Date Taking? Authorizing Provider   tiZANidine (ZANAFLEX) 4 MG tablet Take 1 tablet by mouth 3 times daily as needed (Back pain) 10/4/24  Yes Alexis Barnes MD   dilTIAZem (CARDIZEM CD) 120 MG extended release capsule Take 1 capsule by mouth daily   Yes Arsen Owens MD   gabapentin (NEURONTIN) 300 MG capsule Take 1 capsule by mouth 3 times daily.   Yes Arsen Owens MD   INSULIN LISPRO IJ Inject as directed   Yes Arsen Owens MD   melatonin 3 MG TABS tablet Take 2 tablets by mouth nightly as needed   Yes Arsen Owens MD   midodrine (PROAMATINE) 2.5 MG tablet Take 1 tablet by mouth 2 times daily as needed (for hypotension)   Yes Arsen Owens MD   LORazepam (ATIVAN) 1 MG tablet Take 0.5 tablets by mouth every 8 hours as needed for Anxiety.   Yes Arsen Owens MD   cholecalciferol (VITAMIN D3) 400 UNIT TABS tablet Take 5 tablets by mouth daily   Yes Arsen Owens MD   cholestyramine (QUESTRAN) 4 g packet Take 1 packet by mouth daily   Yes Arsen Owens MD   cyanocobalamin 1000 MCG tablet Take 1 tablet by mouth daily   Yes Arsen Owens MD   dicyclomine (BENTYL) 10 MG capsule Take 1 capsule by mouth 4 times daily (before meals

## 2024-12-16 NOTE — PROGRESS NOTES
[facility]   Pharmacy Pharmacokinetic Monitoring Service - Vancomycin     Krista Chiang is a 44 y.o. female starting on vancomycin therapy for SSTI. Pharmacy consulted by Gautam Lundy DO for monitoring and adjustment.    Target Concentration: Goal trough of 10-15 mg/L and AUC/JAVED <500 mg*hr/L    Additional Antimicrobials: zosyn    Pertinent Laboratory Values:   Wt Readings from Last 1 Encounters:   12/15/24 95.3 kg (210 lb)     Temp Readings from Last 1 Encounters:   12/15/24 98 °F (36.7 °C) (Oral)     Estimated Creatinine Clearance: 172 mL/min (A) (based on SCr of 0.5 mg/dL (L)).  Recent Labs     12/15/24  1136   CREATININE 0.5*   BUN 9   WBC 6.2     Procalcitonin:     Pertinent Cultures:  Culture Date Source Results   12/15 BC    MRSA Nasal Swab: N/A. Non-respiratory infection.    Plan:  Dosing recommendations based on Bayesian software  Start vancomycin 2500 mg LD then 1500 mg q12h  Anticipated AUC of 508 and trough concentration of 10.3 at steady state  Renal labs as indicated   Vancomycin concentration ordered for 12/16/24 @ 2100   Pharmacy will continue to monitor patient and adjust therapy as indicated    Loading dose: 2500 mg at 15:51 12/15/2024.  Regimen: 1500 mg IV every 12 hours.  Start time: 03:51 on 12/16/2024  Exposure target: AUC24 (range)400-500 mg/L.hr   FVE64-81: 480 mg/L.hr  AUC24,ss: 508 mg/L.hr  Probability of AUC24 > 400: 77 %  Ctrough,ss: 10.3 mg/L  Probability of Ctrough,ss > 20: 5 %    Thank you for the consult,  Zion Lange RPH  12/15/2024 9:42 PM

## 2024-12-16 NOTE — PLAN OF CARE
Problem: Discharge Planning  Goal: Discharge to home or other facility with appropriate resources  12/16/2024 0759 by Amy Rivas RN  Outcome: Progressing     Problem: Safety - Adult  Goal: Free from fall injury  12/16/2024 0759 by Amy Rivas RN  Outcome: Progressing     Problem: Pain  Goal: Verbalizes/displays adequate comfort level or baseline comfort level  12/16/2024 0759 by Amy Rivas RN  Outcome: Progressing     Problem: Neurosensory - Adult  Goal: Achieves maximal functionality and self care  Outcome: Progressing     Problem: Skin/Tissue Integrity - Adult  Goal: Skin integrity remains intact  12/16/2024 0759 by Amy Rivas RN  Outcome: Progressing     Problem: Skin/Tissue Integrity - Adult  Goal: Incisions, wounds, or drain sites healing without S/S of infection  12/16/2024 0759 by Amy Rivas RN  Outcome: Progressing     Problem: Gastrointestinal - Adult  Goal: Minimal or absence of nausea and vomiting  12/16/2024 0759 by Amy Rivas RN  Outcome: Progressing     Problem: Gastrointestinal - Adult  Goal: Maintains adequate nutritional intake  12/16/2024 0759 by Amy Rivas RN  Outcome: Progressing  12/16/2024 0511 by Amy Rivas RN  Outcome: Progressing

## 2024-12-16 NOTE — FLOWSHEET NOTE
12/15/24 1935   Treatment Team Notification   Reason for Communication Review case  (New pt consult: Left hip pain/ infection)   Name of Team Member Notified Dr. Perdomo   Treatment Team Role Consulting Provider   Method of Communication Secure Message   Response No new orders   Notification Time 1936

## 2024-12-16 NOTE — PROGRESS NOTES
[facility]   Pharmacy Pharmacokinetic Monitoring Service - Vancomycin    Consulting Provider: Celina   Indication: SSTI  Target Concentration: Goal trough of 10-15 mg/L and AUC/JAVED <500 mg*hr/L  Day of Therapy: 2  Additional Antimicrobials: zosyn    Pertinent Laboratory Values:   Wt Readings from Last 1 Encounters:   12/16/24 95.3 kg (210 lb)     Temp Readings from Last 1 Encounters:   12/16/24 97.5 °F (36.4 °C) (Oral)     Estimated Creatinine Clearance: 172 mL/min (A) (based on SCr of 0.5 mg/dL (L)).  Recent Labs     12/15/24  1136   CREATININE 0.5*   BUN 9   WBC 6.2         Pertinent Cultures:  Culture Date Source Results   12/15 blood No growth   MRSA Nasal Swab: N/A. Non-respiratory infection.    Recent vancomycin administrations                     vancomycin (VANCOCIN) 1500 mg in 300 mL IVPB (mg) 1,500 mg New Bag 12/16/24 0345    vancomycin (VANCOCIN) 2,500 mg in sodium chloride 0.9 % 500 mL IVPB ()  Restarted 12/15/24 1551     2,500 mg New Bag  1508                    Assessment:  Date/Time Current Dose Concentration Timing of Concentration (h) AUC   12/16/24 1500 mg IV every 12hr 15.9 8 hour post dose 591   Note: Serum concentrations collected for AUC dosing may appear elevated if collected in close proximity to the dose administered, this is not necessarily an indication of toxicity    Plan:  Current dosing regimen is supra-therapeutic  \"Decrease dose to 1250 mg IV every 12 hours  Repeat vancomycin concentration ordered for 12/17 @ 1200   Pharmacy will continue to monitor patient and adjust therapy as indicated    Loading dose: N/A  Regimen: 1250 mg IV every 12 hours.  Start time: 15:45 on 12/16/2024  Exposure target: AUC24 (range)400-500 mg/L.hr   DLH60-48: 492 mg/L.hr  AUC24,ss: 493 mg/L.hr  Probability of AUC24 > 400: 94 %  Ctrough,ss: 10.6 mg/L  Probability of Ctrough,ss > 20: 0 %    Thank you for the consult,  BERNADETTE WYATT [facility]  12/16/2024 1:42 PM

## 2024-12-16 NOTE — CONSULTS
BSO (CERVIX REMOVED)  2011    TONSILLECTOMY      XR MIDLINE EQUAL OR GREATER THAN 5 YEARS  4/24/2013    XR MIDLINE EQUAL OR GREATER THAN 5 YEARS       Medications:      insulin glargine  0.15 Units/kg SubCUTAneous Daily    insulin lispro  0-8 Units SubCUTAneous 4x Daily AC & HS    vancomycin  1,250 mg IntraVENous Q12H    [START ON 12/17/2024] fondaparinux  7.5 mg SubCUTAneous Daily    buPROPion  150 mg Oral QAM    cholestyramine light  1 packet Oral Daily    dilTIAZem  120 mg Oral Daily    cyanocobalamin  1,000 mcg Oral Daily    dicyclomine  10 mg Oral 4x Daily AC & HS    FLUoxetine  40 mg Oral Daily    gabapentin  300 mg Oral TID    pantoprazole  40 mg Oral QAM AC    pravastatin  40 mg Oral Nightly    sodium chloride flush  5-40 mL IntraVENous 2 times per day    cholecalciferol  2,000 Units Oral Daily    piperacillin-tazobactam  3,375 mg IntraVENous Q8H    vancomycin (VANCOCIN) intermittent dosing (placeholder)   Other RX Placeholder       Social History:     Social History     Socioeconomic History    Marital status:      Spouse name: Jeremías Chiang    Number of children: 1    Years of education: 3 years of college    Highest education level: Not on file   Occupational History    Occupation: infant care     Comment: last worked 2008   Tobacco Use    Smoking status: Never    Smokeless tobacco: Never   Vaping Use    Vaping status: Never Used   Substance and Sexual Activity    Alcohol use: Not Currently    Drug use: No    Sexual activity: Not Currently     Partners: Male   Other Topics Concern    Not on file   Social History Narrative    Lives with Jeremías ex  and daughter          Social Determinants of Health     Financial Resource Strain: Medium Risk (7/8/2024)    Received from [facility]    Overall Financial Resource Strain (CARDIA)     Difficulty of Paying Living Expenses: Somewhat hard   Food Insecurity: No Food Insecurity (12/15/2024)    Hunger Vital Sign     Worried About Running Out of Food in  Cholesterol Mother     Diabetes Father     Heart Disease Father     Kidney Disease Father     High Blood Pressure Father     High Cholesterol Father     Stroke Father     No Known Problems Brother     Breast Cancer Maternal Aunt     Cancer Maternal Uncle         of duodenum had whipple    Breast Cancer Maternal Cousin       Medical Decision Making:   I have independently reviewed/ordered the following labs:    CBC with Differential:   Recent Labs     12/15/24  1136   WBC 6.2   HGB 11.4*   HCT 35.9*      LYMPHOPCT 25   MONOPCT 7   EOSPCT 1     BMP:  Recent Labs     12/15/24  1136      K 3.9      CO2 25   BUN 9   CREATININE 0.5*     Hepatic Function Panel:   Recent Labs     12/15/24  1136   BILITOT <0.2   ALKPHOS 174*   ALT 16   AST 15     No results for input(s): \"RPR\" in the last 72 hours.  No results for input(s): \"HIV\" in the last 72 hours.  No results for input(s): \"BC\" in the last 72 hours.  Lab Results   Component Value Date/Time    CREATININE 0.5 12/15/2024 11:36 AM    GLUCOSE 407 12/15/2024 11:36 AM    GLUCOSE 403 06/03/2012 04:17 AM       Detailed results:        Thank you for allowing us to participate in the care of this patient.Please call with questions.    This note is created with the assistance of a speech recognition program.  While intending to generate adocument that actually reflects the content of the visit, the document can still have some errors including those of syntax and sound a like substitutions which may escape proof reading.  It such instances, actual meaningcan be extrapolated by contextual diversion.    Jerome Flannery MD  Office: [phone]  Perfect serve / office [phone]

## 2024-12-16 NOTE — CONSULTS
ORTHOPAEDIC CONSULTATION    Reason for consult  Left Hip Infection with Open Wound    HPI / Chief Complaint  Krista Chiang is a 44 y.o. old female who was admitted to the hospital after a fall at home yesterday 12/15/2024.  Patient states she was ambulating with her walker and lost her balance but states that she fell onto her right side.  She denies any loss of consciousness at the time of fall. Patient admitted to the hospital with concern for postoperative wound infection to the left hip.  Patient has an extensive history with the left hip.  She states that all of this has occurred within the last couple of years.  She states that initially she had a fall at home breaking her left hip and subsequently had a left hip hemiarthroplasty performed by Dr. Traore on 7/19/2023 after sustaining a femoral neck fracture.  Subsequent left hip I&D as well as placement of antibiotic cement beads on 8/21/2023 due to infection post hemiarthroplasty.  Ultimately Dr. 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  Patient was seen by her primary care provider 3 days ago and had the area cultured which did show Staphylococcus and Streptococcus infection and was started on doxycycline.  Orthopedic surgery was consulted today to evaluate the area and give opinion on treatment options.  Patient states that she really has no pain to the left hip region but is tender around the incision site.  Incision does extend from the mid lateral aspect of her leg into the posterior aspect of the hip near the superior aspect of the buttock region.  She does have persistent opening of the wound without complete healing at the posterior aspect of her incision.  She denies any new symptoms.  Patient currently being treated with Zosyn IV for the infection.    Past Medical History  Krista  has a past medical history of Alcohol abuse,

## 2024-12-17 ENCOUNTER — APPOINTMENT (OUTPATIENT)
Dept: MRI IMAGING | Age: 44
DRG: 863 | End: 2024-12-17
Payer: COMMERCIAL

## 2024-12-17 LAB
ALBUMIN SERPL-MCNC: 2.5 G/DL (ref 3.5–5.2)
ALP SERPL-CCNC: 150 U/L (ref 35–104)
ALT SERPL-CCNC: 7 U/L (ref 10–35)
ANION GAP SERPL CALCULATED.3IONS-SCNC: 11 MMOL/L (ref 9–16)
AST SERPL-CCNC: 17 U/L (ref 10–35)
BASOPHILS # BLD: 0 K/UL (ref 0–0.2)
BASOPHILS NFR BLD: 1 % (ref 0–2)
BILIRUB SERPL-MCNC: <0.2 MG/DL (ref 0–1.2)
BUN SERPL-MCNC: 13 MG/DL (ref 6–20)
CALCIUM SERPL-MCNC: 8 MG/DL (ref 8.6–10.4)
CHLORIDE SERPL-SCNC: 106 MMOL/L (ref 98–107)
CO2 SERPL-SCNC: 19 MMOL/L (ref 20–31)
CREAT SERPL-MCNC: 0.6 MG/DL (ref 0.7–1.2)
CREAT SERPL-MCNC: 0.6 MG/DL (ref 0.7–1.2)
CRP SERPL HS-MCNC: 13.3 MG/L (ref 0–5)
DATE LAST DOSE: NORMAL
EOSINOPHIL # BLD: 0.3 K/UL (ref 0–0.4)
EOSINOPHILS RELATIVE PERCENT: 5 % (ref 0–4)
ERYTHROCYTE [DISTWIDTH] IN BLOOD BY AUTOMATED COUNT: 17.3 % (ref 11.5–14.9)
ERYTHROCYTE [SEDIMENTATION RATE] IN BLOOD BY PHOTOMETRIC METHOD: 4 MM/HR (ref 0–20)
EST. AVERAGE GLUCOSE BLD GHB EST-MCNC: 209 MG/DL
GFR, ESTIMATED: >90 ML/MIN/1.73M2
GFR, ESTIMATED: >90 ML/MIN/1.73M2
GLUCOSE BLD-MCNC: 109 MG/DL (ref 65–105)
GLUCOSE BLD-MCNC: 172 MG/DL (ref 65–105)
GLUCOSE BLD-MCNC: 199 MG/DL (ref 65–105)
GLUCOSE BLD-MCNC: 341 MG/DL (ref 65–105)
GLUCOSE SERPL-MCNC: 360 MG/DL (ref 74–99)
HBA1C MFR BLD: 8.9 % (ref 4–6)
HCT VFR BLD AUTO: 32 % (ref 36–46)
HCT VFR BLD AUTO: 35.2 % (ref 36–46)
HGB BLD-MCNC: 10.8 G/DL (ref 12–16)
HGB BLD-MCNC: 9.7 G/DL (ref 12–16)
INR PPP: 1
LYMPHOCYTES NFR BLD: 1.6 K/UL (ref 1–4.8)
LYMPHOCYTES RELATIVE PERCENT: 32 % (ref 24–44)
MCH RBC QN AUTO: 24.9 PG (ref 26–34)
MCHC RBC AUTO-ENTMCNC: 30.2 G/DL (ref 31–37)
MCV RBC AUTO: 82.3 FL (ref 80–100)
MONOCYTES NFR BLD: 0.4 K/UL (ref 0.1–1.3)
MONOCYTES NFR BLD: 7 % (ref 1–7)
NEUTROPHILS NFR BLD: 55 % (ref 36–66)
NEUTS SEG NFR BLD: 2.7 K/UL (ref 1.3–9.1)
PLATELET # BLD AUTO: 257 K/UL (ref 150–450)
PMV BLD AUTO: 9.8 FL (ref 6–12)
POTASSIUM SERPL-SCNC: 4.4 MMOL/L (ref 3.7–5.3)
PROT SERPL-MCNC: 5.3 G/DL (ref 6.6–8.7)
PROTHROMBIN TIME: 13 SEC (ref 11.8–14.6)
RBC # BLD AUTO: 3.89 M/UL (ref 4–5.2)
SODIUM SERPL-SCNC: 136 MMOL/L (ref 136–145)
TME LAST DOSE: 407 H
VANCOMYCIN DOSE: 1250 MG
VANCOMYCIN SERPL-MCNC: 15.6 UG/ML (ref 5–40)
WBC OTHER # BLD: 5 K/UL (ref 3.5–11)

## 2024-12-17 PROCEDURE — 6360000002 HC RX W HCPCS: Performed by: INTERNAL MEDICINE

## 2024-12-17 PROCEDURE — 82947 ASSAY GLUCOSE BLOOD QUANT: CPT

## 2024-12-17 PROCEDURE — 97530 THERAPEUTIC ACTIVITIES: CPT

## 2024-12-17 PROCEDURE — 97535 SELF CARE MNGMENT TRAINING: CPT

## 2024-12-17 PROCEDURE — 85025 COMPLETE CBC W/AUTO DIFF WBC: CPT

## 2024-12-17 PROCEDURE — 85018 HEMOGLOBIN: CPT

## 2024-12-17 PROCEDURE — 6360000004 HC RX CONTRAST MEDICATION: Performed by: INTERNAL MEDICINE

## 2024-12-17 PROCEDURE — 6360000002 HC RX W HCPCS: Performed by: NURSE PRACTITIONER

## 2024-12-17 PROCEDURE — 80202 ASSAY OF VANCOMYCIN: CPT

## 2024-12-17 PROCEDURE — 83540 ASSAY OF IRON: CPT

## 2024-12-17 PROCEDURE — 6360000002 HC RX W HCPCS: Performed by: STUDENT IN AN ORGANIZED HEALTH CARE EDUCATION/TRAINING PROGRAM

## 2024-12-17 PROCEDURE — 86140 C-REACTIVE PROTEIN: CPT

## 2024-12-17 PROCEDURE — 85014 HEMATOCRIT: CPT

## 2024-12-17 PROCEDURE — 6370000000 HC RX 637 (ALT 250 FOR IP)

## 2024-12-17 PROCEDURE — 73723 MRI JOINT LWR EXTR W/O&W/DYE: CPT

## 2024-12-17 PROCEDURE — 99232 SBSQ HOSP IP/OBS MODERATE 35: CPT | Performed by: INTERNAL MEDICINE

## 2024-12-17 PROCEDURE — 83550 IRON BINDING TEST: CPT

## 2024-12-17 PROCEDURE — 6360000002 HC RX W HCPCS

## 2024-12-17 PROCEDURE — A9579 GAD-BASE MR CONTRAST NOS,1ML: HCPCS | Performed by: INTERNAL MEDICINE

## 2024-12-17 PROCEDURE — 85610 PROTHROMBIN TIME: CPT

## 2024-12-17 PROCEDURE — 2060000000 HC ICU INTERMEDIATE R&B

## 2024-12-17 PROCEDURE — 82565 ASSAY OF CREATININE: CPT

## 2024-12-17 PROCEDURE — 2580000003 HC RX 258: Performed by: INTERNAL MEDICINE

## 2024-12-17 PROCEDURE — 83036 HEMOGLOBIN GLYCOSYLATED A1C: CPT

## 2024-12-17 PROCEDURE — 6370000000 HC RX 637 (ALT 250 FOR IP): Performed by: INTERNAL MEDICINE

## 2024-12-17 PROCEDURE — 80053 COMPREHEN METABOLIC PANEL: CPT

## 2024-12-17 PROCEDURE — 2500000003 HC RX 250 WO HCPCS: Performed by: INTERNAL MEDICINE

## 2024-12-17 PROCEDURE — 36415 COLL VENOUS BLD VENIPUNCTURE: CPT

## 2024-12-17 PROCEDURE — 85652 RBC SED RATE AUTOMATED: CPT

## 2024-12-17 PROCEDURE — 2580000003 HC RX 258: Performed by: NURSE PRACTITIONER

## 2024-12-17 RX ORDER — OXYCODONE AND ACETAMINOPHEN 5; 325 MG/1; MG/1
2 TABLET ORAL EVERY 4 HOURS PRN
Status: DISCONTINUED | OUTPATIENT
Start: 2024-12-17 | End: 2024-12-19 | Stop reason: HOSPADM

## 2024-12-17 RX ORDER — SODIUM CHLORIDE 0.9 % (FLUSH) 0.9 %
10 SYRINGE (ML) INJECTION PRN
Status: DISCONTINUED | OUTPATIENT
Start: 2024-12-17 | End: 2024-12-19 | Stop reason: HOSPADM

## 2024-12-17 RX ORDER — VANCOMYCIN 1 G/200ML
1000 INJECTION, SOLUTION INTRAVENOUS EVERY 12 HOURS
Status: DISCONTINUED | OUTPATIENT
Start: 2024-12-17 | End: 2024-12-19

## 2024-12-17 RX ADMIN — OXYCODONE HYDROCHLORIDE AND ACETAMINOPHEN 1 TABLET: 5; 325 TABLET ORAL at 05:58

## 2024-12-17 RX ADMIN — CYANOCOBALAMIN TAB 1000 MCG 1000 MCG: 1000 TAB at 09:47

## 2024-12-17 RX ADMIN — HYDROMORPHONE HYDROCHLORIDE 0.5 MG: 1 INJECTION, SOLUTION INTRAMUSCULAR; INTRAVENOUS; SUBCUTANEOUS at 03:22

## 2024-12-17 RX ADMIN — PIPERACILLIN AND TAZOBACTAM 3375 MG: 3; .375 INJECTION, POWDER, LYOPHILIZED, FOR SOLUTION INTRAVENOUS at 11:47

## 2024-12-17 RX ADMIN — PANTOPRAZOLE SODIUM 40 MG: 40 INJECTION, POWDER, FOR SOLUTION INTRAVENOUS at 13:24

## 2024-12-17 RX ADMIN — DICYCLOMINE HYDROCHLORIDE 10 MG: 10 CAPSULE ORAL at 17:38

## 2024-12-17 RX ADMIN — FLUCONAZOLE 200 MG: 100 TABLET ORAL at 09:47

## 2024-12-17 RX ADMIN — HYDROMORPHONE HYDROCHLORIDE 0.5 MG: 1 INJECTION, SOLUTION INTRAMUSCULAR; INTRAVENOUS; SUBCUTANEOUS at 15:57

## 2024-12-17 RX ADMIN — ONDANSETRON 4 MG: 2 INJECTION, SOLUTION INTRAMUSCULAR; INTRAVENOUS at 23:33

## 2024-12-17 RX ADMIN — HYDROMORPHONE HYDROCHLORIDE 0.5 MG: 1 INJECTION, SOLUTION INTRAMUSCULAR; INTRAVENOUS; SUBCUTANEOUS at 20:28

## 2024-12-17 RX ADMIN — OXYCODONE HYDROCHLORIDE AND ACETAMINOPHEN 2 TABLET: 5; 325 TABLET ORAL at 14:46

## 2024-12-17 RX ADMIN — DILTIAZEM HYDROCHLORIDE 120 MG: 120 CAPSULE, COATED, EXTENDED RELEASE ORAL at 09:48

## 2024-12-17 RX ADMIN — PANTOPRAZOLE SODIUM 40 MG: 40 TABLET, DELAYED RELEASE ORAL at 05:58

## 2024-12-17 RX ADMIN — PIPERACILLIN AND TAZOBACTAM 3375 MG: 3; .375 INJECTION, POWDER, LYOPHILIZED, FOR SOLUTION INTRAVENOUS at 03:22

## 2024-12-17 RX ADMIN — LORAZEPAM 1 MG: 2 INJECTION INTRAMUSCULAR; INTRAVENOUS at 09:46

## 2024-12-17 RX ADMIN — SODIUM CHLORIDE, PRESERVATIVE FREE 10 ML: 5 INJECTION INTRAVENOUS at 19:24

## 2024-12-17 RX ADMIN — VANCOMYCIN 1250 MG: 1.75 INJECTION, SOLUTION INTRAVENOUS at 04:07

## 2024-12-17 RX ADMIN — OXYCODONE HYDROCHLORIDE AND ACETAMINOPHEN 2 TABLET: 5; 325 TABLET ORAL at 19:22

## 2024-12-17 RX ADMIN — BUPROPION HYDROCHLORIDE 150 MG: 150 TABLET, EXTENDED RELEASE ORAL at 09:47

## 2024-12-17 RX ADMIN — INSULIN GLARGINE 14 UNITS: 100 INJECTION, SOLUTION SUBCUTANEOUS at 09:48

## 2024-12-17 RX ADMIN — GABAPENTIN 300 MG: 300 CAPSULE ORAL at 20:28

## 2024-12-17 RX ADMIN — ONDANSETRON 4 MG: 2 INJECTION, SOLUTION INTRAMUSCULAR; INTRAVENOUS at 08:01

## 2024-12-17 RX ADMIN — SODIUM CHLORIDE, PRESERVATIVE FREE 10 ML: 5 INJECTION INTRAVENOUS at 20:29

## 2024-12-17 RX ADMIN — FLUOXETINE HYDROCHLORIDE 40 MG: 20 CAPSULE ORAL at 09:47

## 2024-12-17 RX ADMIN — CHOLESTYRAMINE 4 G: 4 POWDER, FOR SUSPENSION ORAL at 09:49

## 2024-12-17 RX ADMIN — SODIUM CHLORIDE 8 MG/HR: 9 INJECTION, SOLUTION INTRAVENOUS at 22:19

## 2024-12-17 RX ADMIN — DICYCLOMINE HYDROCHLORIDE 10 MG: 10 CAPSULE ORAL at 05:58

## 2024-12-17 RX ADMIN — GABAPENTIN 300 MG: 300 CAPSULE ORAL at 13:25

## 2024-12-17 RX ADMIN — SODIUM CHLORIDE, PRESERVATIVE FREE 10 ML: 5 INJECTION INTRAVENOUS at 09:49

## 2024-12-17 RX ADMIN — DICYCLOMINE HYDROCHLORIDE 10 MG: 10 CAPSULE ORAL at 20:28

## 2024-12-17 RX ADMIN — HYDROMORPHONE HYDROCHLORIDE 0.5 MG: 1 INJECTION, SOLUTION INTRAMUSCULAR; INTRAVENOUS; SUBCUTANEOUS at 11:40

## 2024-12-17 RX ADMIN — OXYCODONE HYDROCHLORIDE AND ACETAMINOPHEN 1 TABLET: 5; 325 TABLET ORAL at 10:22

## 2024-12-17 RX ADMIN — HYDROMORPHONE HYDROCHLORIDE 1 MG: 1 INJECTION, SOLUTION INTRAMUSCULAR; INTRAVENOUS; SUBCUTANEOUS at 13:25

## 2024-12-17 RX ADMIN — DICYCLOMINE HYDROCHLORIDE 10 MG: 10 CAPSULE ORAL at 09:47

## 2024-12-17 RX ADMIN — HYDROMORPHONE HYDROCHLORIDE 0.5 MG: 1 INJECTION, SOLUTION INTRAMUSCULAR; INTRAVENOUS; SUBCUTANEOUS at 07:32

## 2024-12-17 RX ADMIN — GABAPENTIN 300 MG: 300 CAPSULE ORAL at 09:48

## 2024-12-17 RX ADMIN — GADOTERIDOL 20 ML: 279.3 INJECTION, SOLUTION INTRAVENOUS at 19:24

## 2024-12-17 RX ADMIN — ONDANSETRON 4 MG: 2 INJECTION, SOLUTION INTRAMUSCULAR; INTRAVENOUS at 17:48

## 2024-12-17 RX ADMIN — PRAVASTATIN SODIUM 40 MG: 40 TABLET ORAL at 20:28

## 2024-12-17 RX ADMIN — VANCOMYCIN 1000 MG: 1 INJECTION, SOLUTION INTRAVENOUS at 20:38

## 2024-12-17 RX ADMIN — PIPERACILLIN AND TAZOBACTAM 3375 MG: 3; .375 INJECTION, POWDER, LYOPHILIZED, FOR SOLUTION INTRAVENOUS at 20:34

## 2024-12-17 RX ADMIN — INSULIN LISPRO 6 UNITS: 100 INJECTION, SOLUTION INTRAVENOUS; SUBCUTANEOUS at 17:37

## 2024-12-17 RX ADMIN — OXYCODONE HYDROCHLORIDE AND ACETAMINOPHEN 2 TABLET: 5; 325 TABLET ORAL at 23:33

## 2024-12-17 ASSESSMENT — PAIN SCALES - GENERAL
PAINLEVEL_OUTOF10: 8
PAINLEVEL_OUTOF10: 4
PAINLEVEL_OUTOF10: 9
PAINLEVEL_OUTOF10: 4
PAINLEVEL_OUTOF10: 8
PAINLEVEL_OUTOF10: 7
PAINLEVEL_OUTOF10: 4
PAINLEVEL_OUTOF10: 8
PAINLEVEL_OUTOF10: 9
PAINLEVEL_OUTOF10: 8

## 2024-12-17 ASSESSMENT — PAIN DESCRIPTION - DESCRIPTORS
DESCRIPTORS: ACHING
DESCRIPTORS: ACHING;BURNING
DESCRIPTORS: ACHING
DESCRIPTORS: STABBING;THROBBING
DESCRIPTORS: ACHING;BURNING

## 2024-12-17 ASSESSMENT — PAIN DESCRIPTION - ORIENTATION
ORIENTATION: LEFT

## 2024-12-17 ASSESSMENT — PAIN DESCRIPTION - LOCATION
LOCATION: HIP;LEG
LOCATION: HIP;LEG
LOCATION: HIP

## 2024-12-17 NOTE — PROGRESS NOTES
[facility]   Pharmacy Pharmacokinetic Monitoring Service - Vancomycin    Consulting Provider: Celina   Indication: SSTI  Target Concentration: Goal trough of 10-15 mg/L and AUC/JAVED <500 mg*hr/L  Day of Therapy: 3  Additional Antimicrobials: zosyn    Pertinent Laboratory Values:   Wt Readings from Last 1 Encounters:   12/16/24 95.3 kg (210 lb)     Temp Readings from Last 1 Encounters:   12/17/24 98.4 °F (36.9 °C) (Oral)     Estimated Creatinine Clearance: 143 mL/min (A) (based on SCr of 0.6 mg/dL (L)).  Recent Labs     12/15/24  1136 12/17/24  0534   CREATININE 0.5* 0.6*   BUN 9  --    WBC 6.2  --        Pertinent Cultures:  Culture Date Source Results   12/15 blood NGTD   MRSA Nasal Swab: N/A. Non-respiratory infection.    Recent vancomycin administrations                     vancomycin (VANCOCIN) 1250 mg in 250 mL IVPB (mg) 1,250 mg New Bag 12/17/24 0407     1,250 mg New Bag 12/16/24 1716    vancomycin (VANCOCIN) 1500 mg in 300 mL IVPB (mg) 1,500 mg New Bag 12/16/24 0345    vancomycin (VANCOCIN) 2,500 mg in sodium chloride 0.9 % 500 mL IVPB ()  Restarted 12/15/24 1551     2,500 mg New Bag  1508                    Assessment:  Date/Time Current Dose Concentration Timing of Concentration (h) AUC   12/17 1250 mg IVPB Q12H 15.6 8 509   Note: Serum concentrations collected for AUC dosing may appear elevated if collected in close proximity to the dose administered, this is not necessarily an indication of toxicity    Plan:  Current dosing regimen is supra-therapeutic  \"Decrease dose to 1000 mg IVPB Q12H  Repeat vancomycin concentration ordered for 12/19 @ 1200   Pharmacy will continue to monitor patient and adjust therapy as indicated    Loading dose: N/A  Regimen: 1000 mg IV every 12 hours.  Start time: 16:07 on 12/17/2024  Exposure target: AUC24 (range)400-500 mg/L.hr   UAX05-92: 418 mg/L.hr  AUC24,ss: 409 mg/L.hr  Probability of AUC24 > 400: 59 %  Ctrough,ss: 9 mg/L  Probability of Ctrough,ss > 20: 0  %      Thank you for the consult,  Roma Montgomery [facility]  12/17/2024 12:45 PM

## 2024-12-17 NOTE — PROGRESS NOTES
remission (HCC) 12/16/2024 Yes    Primary hypercoagulable state (HCC) (Chronic) 12/16/2024 Yes    Type 2 diabetes mellitus with diabetic polyneuropathy, with long-term current use of insulin (HCC) 12/16/2024 Yes    Hypertriglyceridemia 12/16/2024 Yes    Essential hypertension (Chronic) 12/16/2024 Yes    Antiphospholipid syndrome (HCC) (Chronic) 12/16/2024 Yes    History of Awa-en-Y gastric bypass (Chronic) 12/16/2024 Yes    History of drug abuse (HCC) 12/16/2024 Yes    Bipolar depression (HCC) (Chronic) 12/16/2024 Yes       Plan:     Patient status inpatient in the Progressive Unit/Step down    1.  44-year-old female with extensive history of left hip surgeries, initially done by Dr. Traore, subsequent surgeries done at outlying facility, presented with increased pain discomfort and infection in the left hip, started on Vanco meropenem, ID on board, orthopedic surgery on board,  Chronic left hip wound infection,  Orthopedic surgery already saw the patient, as per them it may not be a deep infection,  Continue to antibiotics, and he can follow-up with Dr. Traore and family at [facility],  Tylenol history of MRSA, on vancomycin at this time,  Pulmonary embolism and anticoagulated with fondaparinux  Type 2 diabetes, sugars ACHS,  History of polysubstance abuse in the past, watch for withdrawal,  Status post bariatric surgery, check magnesium level and B12 levels and calcium levels,  History of alcohol abuse, watch for withdrawal,  DVT prophylaxis on fondaparinux at this time,  Full CODE STATUS  2. Disposition 2d    December 17,  Seen and examined face-to-face,  44-year-old female with left hip wound status post left hip hemiarthroplasty by Dr. Traore on July 19, 2023, after sustaining a femoral neck fracture, left hip I&D and placement of antibiotic cement beads on August 21 due to infection post hemiarthroplasty, status post left hip Girdlestone at [facility], underlying diabetes, alcohol abuse, antiphospholipid

## 2024-12-17 NOTE — CARE COORDINATION
ONGOING DISCHARGE PLAN:    Patient is alert and oriented x4.    Spoke with patient regarding discharge plan and patient confirms that plan is still to discharge to home with no needs    IV atb     GI consult     Will continue to follow for additional discharge needs.    If patient is discharged prior to next notation, then this note serves as note for discharge by case management.    Electronically signed by Adamaris Villatoro RN on 12/17/2024 at 4:14 PM

## 2024-12-17 NOTE — PROGRESS NOTES
[facility]   INPATIENT OCCUPATIONAL THERAPY  PROGRESS NOTE  Date: 2024  Patient Name: Krista Chiang       Room: 8-01  MRN: [id number]    : 1980  (44 y.o.)  Gender: female   Referring Practitioner: Wilner Ash MD  Diagnosis: Left hip postoperative infection      Discharge Recommendations:  Further Occupational Therapy is recommended upon facility discharge.      OT Equipment Recommendations  Other: CTA    Restrictions/Precautions  Restrictions/Precautions  Restrictions/Precautions: Contact Precautions;Fall Risk  Activity Level: Up with Assist  Required Braces or Orthoses?: No    O2 Device: None (Room air)  Comment: Pt did reports pain in L hip but did not rate.    Subjective  Subjective  Subjective: \"lets do it\" pt pleasant and agreeable to engage in OT session  Comments: Ok per RN Keerthi for OT    Objective  Orientation  Overall Orientation Status: Within Functional Limits  Cognition  Overall Cognitive Status: WFL    Activities of Daily Living  Grooming: Stand by assistance  Grooming Skilled Clinical Factors: standing at sink near doorway to wash hair. No LOB noted. Good safety.  LE Dressing: Stand by assistance  LE Dressing Skilled Clinical Factors: seated to thread BLEs into brief, SBA-SUP standing to manage up over hips  Toileting: Stand by assistance  Toileting Skilled Clinical Factors: while seated on toilet, pt able to complete bottom hygiene while weight shifting. SBA-SUP for brief management.  Additional Comments: Pt currently limited due to pain, impaired balance, decreased strength, and activity tolerance impacting safety and independence with self-care tasks.    Balance  Balance  Sitting Balance: Independent  Standing Balance: Stand by assistance (SBA-SUP)    Transfers/Mobility  Bed mobility  Supine to Sit: Modified independent  Sit to Supine: Modified independent  Scooting: Independent  Bed Mobility Comments: HOB slightly elevated. IND sitting balance at

## 2024-12-17 NOTE — PROGRESS NOTES
Writer perfect serve NPCassy to get order for pt dressing change. Order put in to change dressing Qshift. Order receive.     20:42:  Writer inform NP that pt wanted to see if her dilaudid could be increase to 1mg and that pt inform writer that she feels that she has a yeast infection d/t the abx. NP put in order for percocet Q4 and d/c pt oxycodone and an order for diflucan Qdaily. Order receive.

## 2024-12-17 NOTE — PROGRESS NOTES
[facility] -   Infectious diseases evaluation  admission date 12/15/2024    reason for consultation:   Joint infection     Impression :   Current:  Left hip wound   S/P left hip hemiarthroplasty performed by Dr. Traore on 7/19/2023 after sustaining a femoral neck fracture.    S/P left hip I&D /placement of antibiotic cement beads on 8/21/2023 due to infection post hemiarthroplasty.    S/P left hip Girdlestone at [facility].    DM  Alcohol abuse   Antiphospholipid syndrome   H/O bariatric surgery   H/O PE  H/O MRSA/MDRO/VRE/Acinetobacter      HENCE:   IV vancomycin and Zosyn  Left hip MRI  Orthopedic surgery evaluation noted    Infection Control Recommendations   Amistad Precautions  Contact Isolation       Antimicrobial Stewardship Recommendations   Simplification of therapy  Targeted therapy      History of Present Illness:   Initial history:  Krista Chiang is a 44 y.o.-year-old female after a fall onto the right side at home.  She denied head injury or loss of consciousness, did have an episode of nausea and vomiting, denied fever or chills.  She had left hip wound that is actually improving with no significant purulent drainage, no surrounding redness.  CT hip reviewed showed  IMPRESSION:  1.    Redemonstration of abnormal soft tissue thickening throughout the left  hip joint space and bony erosions of the acetabulum/proximal femur, highly  suspicious for on going infection.  Findings are similar to prior.  Joint  aspiration should be considered.  No drainable fluid collection identified  within the soft tissues of the left hip.  1.  Incompletely healed proximal femoral diaphysis fracture held in place by  cerclage wires, similar to priors.  No acute fracture or dislocation.    The patient was seen by primary care physician recently was started on doxycycline, reportedly left hip culture grew staph and Streptococcus  The patient had multiple surgeries left hip after

## 2024-12-17 NOTE — PLAN OF CARE
Problem: Chronic Conditions and Co-morbidities  Goal: Patient's chronic conditions and co-morbidity symptoms are monitored and maintained or improved  12/17/2024 0408 by Kriss Pierre, RN  Outcome: Progressing  Flowsheets (Taken 12/17/2024 0408)  Care Plan - Patient's Chronic Conditions and Co-Morbidity Symptoms are Monitored and Maintained or Improved:   Collaborate with multidisciplinary team to address chronic and comorbid conditions and prevent exacerbation or deterioration   Monitor and assess patient's chronic conditions and comorbid symptoms for stability, deterioration, or improvement   Update acute care plan with appropriate goals if chronic or comorbid symptoms are exacerbated and prevent overall improvement and discharge  Note: Made sure pt. Understood what their conditions was and why it was occurring. Also educated pt. On ways to prevent the condition from worsening and from occurring again.      Problem: Discharge Planning  Goal: Discharge to home or other facility with appropriate resources  12/17/2024 0408 by Kriss Pierre, RN  Outcome: Progressing  Flowsheets (Taken 12/17/2024 0408)  Discharge to home or other facility with appropriate resources:   Identify barriers to discharge with patient and caregiver   Identify discharge learning needs (meds, wound care, etc)   Arrange for needed discharge resources and transportation as appropriate  Note: Inform pt. Of discharge teaching and planned. Instructed pt. To inform me if further teaching need to be done.      Problem: Pain  Goal: Verbalizes/displays adequate comfort level or baseline comfort level  12/17/2024 0408 by Kriss Pierre, RN  Outcome: Progressing  Flowsheets (Taken 12/17/2024 0408)  Verbalizes/displays adequate comfort level or baseline comfort level:   Encourage patient to monitor pain and request assistance   Assess pain using appropriate pain scale   Administer analgesics based on type and severity of pain and evaluate

## 2024-12-18 ENCOUNTER — ANESTHESIA (OUTPATIENT)
Dept: ENDOSCOPY | Age: 44
DRG: 863 | End: 2024-12-18
Payer: COMMERCIAL

## 2024-12-18 ENCOUNTER — ANESTHESIA EVENT (OUTPATIENT)
Dept: ENDOSCOPY | Age: 44
DRG: 863 | End: 2024-12-18
Payer: COMMERCIAL

## 2024-12-18 PROBLEM — K92.1 MELENA: Status: ACTIVE | Noted: 2024-12-18

## 2024-12-18 LAB
GLUCOSE BLD-MCNC: 110 MG/DL (ref 65–105)
GLUCOSE BLD-MCNC: 113 MG/DL (ref 65–105)
GLUCOSE BLD-MCNC: 144 MG/DL (ref 65–105)
GLUCOSE BLD-MCNC: 247 MG/DL (ref 65–105)
GLUCOSE BLD-MCNC: 253 MG/DL (ref 65–105)
HCT VFR BLD AUTO: 32.2 % (ref 36–46)
HCT VFR BLD AUTO: 32.9 % (ref 36–46)
HCT VFR BLD AUTO: 36 % (ref 36–46)
HGB BLD-MCNC: 10.2 G/DL (ref 12–16)
HGB BLD-MCNC: 11.1 G/DL (ref 12–16)
HGB BLD-MCNC: 9.9 G/DL (ref 12–16)

## 2024-12-18 PROCEDURE — 2500000003 HC RX 250 WO HCPCS: Performed by: INTERNAL MEDICINE

## 2024-12-18 PROCEDURE — 82947 ASSAY GLUCOSE BLOOD QUANT: CPT

## 2024-12-18 PROCEDURE — 83540 ASSAY OF IRON: CPT

## 2024-12-18 PROCEDURE — 36415 COLL VENOUS BLD VENIPUNCTURE: CPT

## 2024-12-18 PROCEDURE — 83550 IRON BINDING TEST: CPT

## 2024-12-18 PROCEDURE — 88342 IMHCHEM/IMCYTCHM 1ST ANTB: CPT

## 2024-12-18 PROCEDURE — 6370000000 HC RX 637 (ALT 250 FOR IP)

## 2024-12-18 PROCEDURE — 3700000000 HC ANESTHESIA ATTENDED CARE: Performed by: INTERNAL MEDICINE

## 2024-12-18 PROCEDURE — APPNB30 APP NON BILLABLE TIME 0-30 MINS: Performed by: NURSE PRACTITIONER

## 2024-12-18 PROCEDURE — 6360000002 HC RX W HCPCS: Performed by: INTERNAL MEDICINE

## 2024-12-18 PROCEDURE — 2709999900 HC NON-CHARGEABLE SUPPLY: Performed by: INTERNAL MEDICINE

## 2024-12-18 PROCEDURE — 2580000003 HC RX 258: Performed by: INTERNAL MEDICINE

## 2024-12-18 PROCEDURE — 99233 SBSQ HOSP IP/OBS HIGH 50: CPT | Performed by: INTERNAL MEDICINE

## 2024-12-18 PROCEDURE — 43239 EGD BIOPSY SINGLE/MULTIPLE: CPT | Performed by: INTERNAL MEDICINE

## 2024-12-18 PROCEDURE — 6360000002 HC RX W HCPCS: Performed by: NURSE PRACTITIONER

## 2024-12-18 PROCEDURE — 6360000002 HC RX W HCPCS

## 2024-12-18 PROCEDURE — 0DB68ZX EXCISION OF STOMACH, VIA NATURAL OR ARTIFICIAL OPENING ENDOSCOPIC, DIAGNOSTIC: ICD-10-PCS | Performed by: INTERNAL MEDICINE

## 2024-12-18 PROCEDURE — 6370000000 HC RX 637 (ALT 250 FOR IP): Performed by: INTERNAL MEDICINE

## 2024-12-18 PROCEDURE — 85014 HEMATOCRIT: CPT

## 2024-12-18 PROCEDURE — 3700000001 HC ADD 15 MINUTES (ANESTHESIA): Performed by: INTERNAL MEDICINE

## 2024-12-18 PROCEDURE — 88305 TISSUE EXAM BY PATHOLOGIST: CPT

## 2024-12-18 PROCEDURE — 7100000000 HC PACU RECOVERY - FIRST 15 MIN: Performed by: INTERNAL MEDICINE

## 2024-12-18 PROCEDURE — 7100000001 HC PACU RECOVERY - ADDTL 15 MIN: Performed by: INTERNAL MEDICINE

## 2024-12-18 PROCEDURE — 99223 1ST HOSP IP/OBS HIGH 75: CPT | Performed by: INTERNAL MEDICINE

## 2024-12-18 PROCEDURE — 2580000003 HC RX 258: Performed by: ANESTHESIOLOGY

## 2024-12-18 PROCEDURE — 2060000000 HC ICU INTERMEDIATE R&B

## 2024-12-18 PROCEDURE — 3609012400 HC EGD TRANSORAL BIOPSY SINGLE/MULTIPLE: Performed by: INTERNAL MEDICINE

## 2024-12-18 PROCEDURE — 85018 HEMOGLOBIN: CPT

## 2024-12-18 RX ORDER — SODIUM CHLORIDE 9 MG/ML
INJECTION, SOLUTION INTRAVENOUS CONTINUOUS
Status: DISCONTINUED | OUTPATIENT
Start: 2024-12-18 | End: 2024-12-18 | Stop reason: HOSPADM

## 2024-12-18 RX ORDER — LIDOCAINE HYDROCHLORIDE 10 MG/ML
INJECTION, SOLUTION EPIDURAL; INFILTRATION; INTRACAUDAL; PERINEURAL
Status: DISCONTINUED | OUTPATIENT
Start: 2024-12-18 | End: 2024-12-18 | Stop reason: SDUPTHER

## 2024-12-18 RX ORDER — PROPOFOL 10 MG/ML
INJECTION, EMULSION INTRAVENOUS
Status: DISCONTINUED | OUTPATIENT
Start: 2024-12-18 | End: 2024-12-18 | Stop reason: SDUPTHER

## 2024-12-18 RX ORDER — PANTOPRAZOLE SODIUM 40 MG/1
40 TABLET, DELAYED RELEASE ORAL
Status: DISCONTINUED | OUTPATIENT
Start: 2024-12-21 | End: 2024-12-18

## 2024-12-18 RX ORDER — MIDAZOLAM HYDROCHLORIDE 1 MG/ML
INJECTION, SOLUTION INTRAMUSCULAR; INTRAVENOUS
Status: DISCONTINUED | OUTPATIENT
Start: 2024-12-18 | End: 2024-12-18 | Stop reason: SDUPTHER

## 2024-12-18 RX ORDER — VITAMIN B COMPLEX
2000 TABLET ORAL DAILY
Status: DISCONTINUED | OUTPATIENT
Start: 2024-12-18 | End: 2024-12-19 | Stop reason: HOSPADM

## 2024-12-18 RX ADMIN — GABAPENTIN 300 MG: 300 CAPSULE ORAL at 08:22

## 2024-12-18 RX ADMIN — SODIUM CHLORIDE, PRESERVATIVE FREE 10 ML: 5 INJECTION INTRAVENOUS at 20:45

## 2024-12-18 RX ADMIN — HYDROMORPHONE HYDROCHLORIDE 0.5 MG: 1 INJECTION, SOLUTION INTRAMUSCULAR; INTRAVENOUS; SUBCUTANEOUS at 00:28

## 2024-12-18 RX ADMIN — VANCOMYCIN 1000 MG: 1 INJECTION, SOLUTION INTRAVENOUS at 22:58

## 2024-12-18 RX ADMIN — OXYCODONE HYDROCHLORIDE AND ACETAMINOPHEN 2 TABLET: 5; 325 TABLET ORAL at 22:49

## 2024-12-18 RX ADMIN — Medication 2000 UNITS: at 13:53

## 2024-12-18 RX ADMIN — INSULIN LISPRO 4 UNITS: 100 INJECTION, SOLUTION INTRAVENOUS; SUBCUTANEOUS at 20:45

## 2024-12-18 RX ADMIN — VANCOMYCIN 1000 MG: 1 INJECTION, SOLUTION INTRAVENOUS at 08:31

## 2024-12-18 RX ADMIN — DICYCLOMINE HYDROCHLORIDE 10 MG: 10 CAPSULE ORAL at 05:10

## 2024-12-18 RX ADMIN — PROPOFOL 180 MG: 10 INJECTION, EMULSION INTRAVENOUS at 11:55

## 2024-12-18 RX ADMIN — SODIUM CHLORIDE, PRESERVATIVE FREE 10 ML: 5 INJECTION INTRAVENOUS at 08:22

## 2024-12-18 RX ADMIN — DICYCLOMINE HYDROCHLORIDE 10 MG: 10 CAPSULE ORAL at 20:45

## 2024-12-18 RX ADMIN — PRAVASTATIN SODIUM 40 MG: 40 TABLET ORAL at 20:45

## 2024-12-18 RX ADMIN — OXYCODONE HYDROCHLORIDE AND ACETAMINOPHEN 2 TABLET: 5; 325 TABLET ORAL at 18:22

## 2024-12-18 RX ADMIN — MIDAZOLAM 2 MG: 1 INJECTION INTRAMUSCULAR; INTRAVENOUS at 11:49

## 2024-12-18 RX ADMIN — PIPERACILLIN AND TAZOBACTAM 3375 MG: 3; .375 INJECTION, POWDER, LYOPHILIZED, FOR SOLUTION INTRAVENOUS at 13:54

## 2024-12-18 RX ADMIN — OXYCODONE HYDROCHLORIDE AND ACETAMINOPHEN 2 TABLET: 5; 325 TABLET ORAL at 13:59

## 2024-12-18 RX ADMIN — LORAZEPAM 0.5 MG: 0.5 TABLET ORAL at 16:43

## 2024-12-18 RX ADMIN — FLUOXETINE HYDROCHLORIDE 40 MG: 20 CAPSULE ORAL at 08:23

## 2024-12-18 RX ADMIN — HYDROMORPHONE HYDROCHLORIDE 0.5 MG: 1 INJECTION, SOLUTION INTRAMUSCULAR; INTRAVENOUS; SUBCUTANEOUS at 05:10

## 2024-12-18 RX ADMIN — SODIUM CHLORIDE: 9 INJECTION, SOLUTION INTRAVENOUS at 11:50

## 2024-12-18 RX ADMIN — SODIUM CHLORIDE 8 MG/HR: 9 INJECTION, SOLUTION INTRAVENOUS at 18:29

## 2024-12-18 RX ADMIN — INSULIN LISPRO 2 UNITS: 100 INJECTION, SOLUTION INTRAVENOUS; SUBCUTANEOUS at 18:23

## 2024-12-18 RX ADMIN — DILTIAZEM HYDROCHLORIDE 120 MG: 120 CAPSULE, COATED, EXTENDED RELEASE ORAL at 08:23

## 2024-12-18 RX ADMIN — OXYCODONE HYDROCHLORIDE AND ACETAMINOPHEN 2 TABLET: 5; 325 TABLET ORAL at 08:23

## 2024-12-18 RX ADMIN — DICYCLOMINE HYDROCHLORIDE 10 MG: 10 CAPSULE ORAL at 13:54

## 2024-12-18 RX ADMIN — DICYCLOMINE HYDROCHLORIDE 10 MG: 10 CAPSULE ORAL at 16:43

## 2024-12-18 RX ADMIN — GABAPENTIN 300 MG: 300 CAPSULE ORAL at 13:53

## 2024-12-18 RX ADMIN — LIDOCAINE HYDROCHLORIDE 50 MG: 10 INJECTION, SOLUTION EPIDURAL; INFILTRATION; INTRACAUDAL; PERINEURAL at 11:55

## 2024-12-18 RX ADMIN — BUPROPION HYDROCHLORIDE 150 MG: 150 TABLET, EXTENDED RELEASE ORAL at 08:23

## 2024-12-18 RX ADMIN — FLUCONAZOLE 200 MG: 100 TABLET ORAL at 08:23

## 2024-12-18 RX ADMIN — CYANOCOBALAMIN TAB 1000 MCG 1000 MCG: 1000 TAB at 08:22

## 2024-12-18 RX ADMIN — GABAPENTIN 300 MG: 300 CAPSULE ORAL at 20:45

## 2024-12-18 RX ADMIN — PIPERACILLIN AND TAZOBACTAM 3375 MG: 3; .375 INJECTION, POWDER, LYOPHILIZED, FOR SOLUTION INTRAVENOUS at 04:05

## 2024-12-18 RX ADMIN — OXYCODONE HYDROCHLORIDE AND ACETAMINOPHEN 2 TABLET: 5; 325 TABLET ORAL at 04:05

## 2024-12-18 ASSESSMENT — PAIN SCALES - GENERAL
PAINLEVEL_OUTOF10: 0
PAINLEVEL_OUTOF10: 8
PAINLEVEL_OUTOF10: 0
PAINLEVEL_OUTOF10: 4
PAINLEVEL_OUTOF10: 8
PAINLEVEL_OUTOF10: 4
PAINLEVEL_OUTOF10: 0
PAINLEVEL_OUTOF10: 5
PAINLEVEL_OUTOF10: 10
PAINLEVEL_OUTOF10: 7
PAINLEVEL_OUTOF10: 7
PAINLEVEL_OUTOF10: 8
PAINLEVEL_OUTOF10: 6
PAINLEVEL_OUTOF10: 7
PAINLEVEL_OUTOF10: 8
PAINLEVEL_OUTOF10: 8
PAINLEVEL_OUTOF10: 0

## 2024-12-18 ASSESSMENT — PAIN DESCRIPTION - DESCRIPTORS
DESCRIPTORS: STABBING;SHARP
DESCRIPTORS: SHARP
DESCRIPTORS: ACHING
DESCRIPTORS: STABBING;TEARING
DESCRIPTORS: ACHING
DESCRIPTORS: ACHING;STABBING

## 2024-12-18 ASSESSMENT — PAIN DESCRIPTION - ORIENTATION
ORIENTATION: LEFT

## 2024-12-18 ASSESSMENT — PAIN - FUNCTIONAL ASSESSMENT
PAIN_FUNCTIONAL_ASSESSMENT: ACTIVITIES ARE NOT PREVENTED
PAIN_FUNCTIONAL_ASSESSMENT: 0-10
PAIN_FUNCTIONAL_ASSESSMENT: 0-10
PAIN_FUNCTIONAL_ASSESSMENT: ACTIVITIES ARE NOT PREVENTED
PAIN_FUNCTIONAL_ASSESSMENT: PREVENTS OR INTERFERES WITH MANY ACTIVE NOT PASSIVE ACTIVITIES
PAIN_FUNCTIONAL_ASSESSMENT: ACTIVITIES ARE NOT PREVENTED

## 2024-12-18 ASSESSMENT — PAIN DESCRIPTION - LOCATION
LOCATION: LEG;HIP
LOCATION: HIP

## 2024-12-18 ASSESSMENT — PAIN DESCRIPTION - ONSET: ONSET: ON-GOING

## 2024-12-18 ASSESSMENT — ENCOUNTER SYMPTOMS: SHORTNESS OF BREATH: 0

## 2024-12-18 ASSESSMENT — PAIN DESCRIPTION - PAIN TYPE: TYPE: CHRONIC PAIN

## 2024-12-18 ASSESSMENT — PAIN DESCRIPTION - FREQUENCY: FREQUENCY: CONTINUOUS

## 2024-12-18 NOTE — OP NOTE
will be ready for d/c when criteria is met .      Findings:    Retropharyngeal area was grossly normal appearing    Esophagus: normal    Stomach/small bowel:  Awa-en-Y type surgery with  2 large ulcers at the anastomosis biopsy gently were taken  The larger ulcer was 3 cm x 1 cm    The scope was removed and the patient tolerated the procedure well.     Recommendations/Plan:   F/U Biopsies  PPI  Consider suturing as an outpatient if continued not to be healed  Surgical follow-up as an outpatient    Electronically signed by Janet Marques MD  on 12/18/2024 at 12:14 PM

## 2024-12-18 NOTE — PROGRESS NOTES
Dr. Ash notified of MRI results- to review. Per Dr. Ash, notify ID of results.     Notified Dr. Flannery of MRI results via perfect serve. Awaiting response.

## 2024-12-18 NOTE — CONSULTS
dysuria, no flank pain.  Extremities:  No swelling or joint pains.left hip wound  ROS was otherwise negative except as mentioned in the [place].     PHYSICAL EXAM:    BP (!) 105/57   Pulse 68   Temp 97.5 °F (36.4 °C) (Axillary)   Resp 16   Ht 1.727 m (5' 7.99\")   Wt 100.2 kg (220 lb 14.4 oz)   SpO2 97%   BMI 33.60 kg/m²     GENERAL:   Well developed, Well nourished, No apparent distress  HEAD:   Normocephalic, Atraumatic  EENT:   EOMI, Sclera not icteric, Oropharynx moist  NECK:   Supple, Trachea midline  LUNGS:  CTA Bilaterally  HEART: 68 RRR, No murmur  ABDOMEN:   Soft, Nontender, Nondistended, BS WNL  EXT:   No clubbing.  No cyanosis. No edema.  SKIN:   No rashes.  No jaundice.  No stigmata of liver disease.    MUSC/SKEL:   Adequate muscle bulk for patient's age, No significant synovitis, No deformities  NEURO:  A&O x Three, CN II- XII grossly intact    LABS AND IMAGING:    CBC  Recent Labs     12/15/24  1136 12/17/24  1348 12/17/24  2033 12/18/24  0451   WBC 6.2 5.0  --   --    HGB 11.4* 9.7* 10.8* 9.9*   HCT 35.9* 32.0* 35.2* 32.2*   MCV 77.7* 82.3  --   --     257  --   --        BMP  Recent Labs     12/15/24  1136 12/17/24  0534 12/17/24  1348     --  136   K 3.9  --  4.4     --  106   CO2 25  --  19*   BUN 9  --  13   CREATININE 0.5* 0.6* 0.6*   GLUCOSE 407*  --  360*   CALCIUM 9.0  --  8.0*       LFTS  Recent Labs     12/15/24  1136 12/17/24  1348   ALKPHOS 174* 150*   ALT 16 7*   AST 15 17   BILITOT <0.2 <0.2       PT/INR  Recent Labs     12/17/24  0534   PROTIME 13.0   INR 1.0             IMPRESSION/plan  1. Acute blood loss anemia, hx pernicious anemia melena  Case d/w dr love egd today, pt agreeable  Hold the AC  Continue ppi drip  Hh q 8 hours    2.left hip infection   Mgt per ID    3.hx emigdio en y gastric bypass    This plan was formulated in collaboration with Dr. Love  .  Thank you for allowing us to participate in the care of your patient.    Electronically signed by:  Kyleigh Velasquez, APRN - CNP on 12/18/2024 at 9:03 AM       Attending Physician Statement  I have discussed the care of Krista Chiang and I have examined the patient myselft and taken ros and hpi , including pertinent history and exam findings,  with the author of this note . I have reviewed the key elements of all parts of the encounter with the nurse practitioner/resident.  I agree with the assessment, plan and orders as documented by the above health care provider     More than 50% of the time was spent taking care of this patient in addition to the nurse practitioner time.  That also included history taking follow-up physical examination and review of system.       Melena  History of Awa-en-Y surgery  Anemia  History of peptic ulcer disease  On anticoagulation  Plan for   EGD  PPI    Electronically signed by Janet Marques MD

## 2024-12-18 NOTE — ANESTHESIA PRE PROCEDURE
Lab Results   Component Value Date/Time    PROTIME 13.0 12/17/2024 05:34 AM    PROTIME 15.3 12/20/2013 07:45 AM    INR 1.0 12/17/2024 05:34 AM    APTT 24.4 11/26/2023 06:30 PM       HCG (If Applicable):   Lab Results   Component Value Date    PREGTESTUR NEGATIVE 07/19/2023    PREGSERUM NEGATIVE 07/05/2023        ABGs:   Lab Results   Component Value Date/Time    PHART 7.394 05/05/2023 11:45 AM    PO2ART 55.7 05/05/2023 11:45 AM    CMY6YZK 35.6 05/05/2023 11:45 AM    XSN0UWQ 21.7 05/05/2023 11:45 AM    U7DJXCPS 86.0 05/05/2023 11:45 AM        Type & Screen (If Applicable):  Lab Results   Component Value Date    ABORH A POSITIVE 12/04/2023    LABANTI NEGATIVE 12/04/2023       Drug/Infectious Status (If Applicable):  Lab Results   Component Value Date/Time    HEPCAB NONREACTIVE 03/13/2022 06:51 PM       COVID-19 Screening (If Applicable):   Lab Results   Component Value Date/Time    COVID19 Not Detected 04/30/2023 08:52 AM    COVID19 Not Detected 03/14/2022 11:40 AM           Anesthesia Evaluation  Patient summary reviewed and Nursing notes reviewed   no history of anesthetic complications:   Airway: Mallampati: III  TM distance: >3 FB   Neck ROM: full  Mouth opening: > = 3 FB   Dental:    (+) edentulous      Pulmonary:Negative Pulmonary ROS and normal exam  breath sounds clear to auscultation      (-) shortness of breath                           Cardiovascular:    (+) hypertension:      ECG reviewed  Rhythm: regular  Rate: normal  Echocardiogram reviewed                  Neuro/Psych:   (+) CVA:, neuromuscular disease:, headaches:, psychiatric history:            GI/Hepatic/Renal:   (+) GERD: well controlled, liver disease:          Endo/Other:    (+) DiabetesType II DM, blood dyscrasia: anticoagulation therapy and anemia:..                  ROS comment: Left hip infection, chronic  Admitted after fall, c/o N/V, abdominal pain Abdominal:             Vascular:   + PE.       Other Findings:       Anesthesia

## 2024-12-18 NOTE — PROGRESS NOTES
Physical Therapy  [facility]     Date: 24  Patient Name: Krista Chiang       Room: 8/2118-01  MRN: [id number]  Account: [id number]   : 1980  (44 y.o.) Gender: female   Patient Height Height: 172.7 cm (5' 7.99\")  Patient Weight 100.2 kg (220 lb 14.4 oz)      24 0800   Chief Reason for Therapy   Chief Reason General Medical   Restrictions/Precautions   Restrictions/Precautions Contact Precautions;Fall Risk   Activity Level Up with Assist   Required Braces or Orthoses? No   General   Chart Reviewed Yes   Patient assessed for rehabilitation services? Yes   Additional Pertinent Hx Krista Chiang is a 44 y.o. female with history of alcohol abuse, Painting's esophagus, history of bariatric surgery, pulmonary embolism anticoagulated with fondaparinux, blood clots, hypertension, MRSA, pernicious anemia, SVT, and type 2 diabetes mellitus who presents with a fall and head injury (States vision went dark and lost balance upon standing up from chair and fell and hit her head.) She is admitted to the hospital for the management of Left hip postoperative wound infection. Pt reoprts feeling unwell for about a week and has been experiencing nausea and vomiting since Tuesday since she started doxycycline for a hip infection. She has been in skilled nursing care since her past operation to remove infected hardware from her hip, and she has been home for only 1 week. Upon presentation to the ER, the patient was alert and oriented.  She was mildly hypertensive and tachycardic Imaging was concerning for ongoing infection in the left hip space, and joint aspiration should be considered.  CT head and CT spine were both Negative for acute process   Response To Previous Treatment Not applicable   Family/Caregiver Present No   Referring Practitioner Wilner Ash MD   Referral Date  12/15/24   Diagnosis Left hip postoperative wound infection   Follows Commands WFL   General   General

## 2024-12-18 NOTE — PROGRESS NOTES
[facility] Internal Medicine  Raul Heard MD; Ezio Conway MD, Gigi Morales MD, Crystal Estrada MD,   Madeline Hutchison MD; Wilner Ash MD, Carli Mendoza MD.    [facility] Internal Medicine   IN-PATIENT SERVICE   [facility]    Progress note            Date:   12/18/2024  Patient name:  Krista Chiang  Date of admission:  12/15/2024 10:45 AM  MRN:   [id number]  Account:  [id number]  YOB: 1980  PCP:    Arielle Melton APRN - NP  Room:   2118/2118-01  Code Status:    Full Code    Chief Complaint:     Chief Complaint   Patient presents with    Eye Problem    Head Injury    Fall     States vision went dark and lost balance and fell and hit her head. States she is on blood thinners, and currently being treated staph and strep in left hip wound. Left hip repaired in July of 2023       History Obtained From:     Patient/EMR/Bedside RN    History of Present Illness:     Krista Chiang is a 44 y.o. Non- / non  female with history of alcohol abuse, Painting's esophagus, history of bariatric surgery, pulmonary embolism anticoagulated with fondaparinux, blood clots, hypertension, MRSA, pernicious anemia, SVT, and type 2 diabetes mellitus who presents with Eye Problem, Head Injury, and Fall (States vision went dark and lost balance and fell and hit her head. States she is on blood thinners, and currently being treated staph and strep in left hip wound. Left hip repaired in July of 2023)   and is admitted to the hospital for the management of Left hip postoperative wound infection.     According to the patient, she has been feeling unwell for about a week and has been experiencing nausea and vomiting since Tuesday since she started doxycycline for a hip infection. Today, she ramy from her easy chair, saw spots, and \"blacked out \".  She states that she hit her head on the ground.  She endorses that this has never happened to her before.  She  No acute traumatic injury identified in the head or cervical spine. 2.  Increased soft tissue attenuation at the tongue base in the tongue base region could be related to tongue base lesion or adenopathy.  Correlation with direct visual inspection is requested.       Assessment :      Hospital Problems             Last Modified POA    * (Principal) Postoperative wound infection of left hip 12/16/2024 Yes    Alcohol dependence in remission (HCC) 12/16/2024 Yes    Primary hypercoagulable state (HCC) (Chronic) 12/16/2024 Yes    Type 2 diabetes mellitus with diabetic polyneuropathy, with long-term current use of insulin (HCC) 12/16/2024 Yes    Hypertriglyceridemia 12/16/2024 Yes    Essential hypertension (Chronic) 12/16/2024 Yes    Antiphospholipid syndrome (HCC) (Chronic) 12/16/2024 Yes    History of Awa-en-Y gastric bypass (Chronic) 12/16/2024 Yes    History of drug abuse (HCC) 12/16/2024 Yes    Bipolar depression (HCC) (Chronic) 12/16/2024 Yes       Plan:     Patient status inpatient in the Progressive Unit/Step down    1.  44-year-old female with extensive history of left hip surgeries, initially done by Dr. Traore, [facility], presented with increased pain discomfort and infection in the left hip, started on Vanco meropenem, ID on board, orthopedic surgery on board,  Chronic left hip wound infection,  Orthopedic surgery already saw the patient, as per them it may not be a deep infection,  Continue to antibiotics, and he can follow-up with Dr. Traore and family at [facility] history of MRSA, on vancomycin at this time,  Pulmonary embolism and anticoagulated with fondaparinux  Type 2 diabetes, sugars ACHS,  History of polysubstance abuse in the past, watch for withdrawal,  Status post bariatric surgery, check magnesium level and B12 levels and calcium levels,  History of alcohol abuse, watch for withdrawal,  DVT prophylaxis on fondaparinux at this time,  Full CODE

## 2024-12-18 NOTE — PROGRESS NOTES
[facility]   Pharmacy Pharmacokinetic Monitoring Service - Vancomycin    Consulting Provider: Celina   Indication: SSTI  Target Concentration: Goal trough of 10-15 mg/L and AUC/JAVED <500 mg*hr/L  Day of Therapy: 4  Additional Antimicrobials: Zosyn/Fluconazole    Pertinent Laboratory Values:   Wt Readings from Last 1 Encounters:   12/18/24 100.2 kg (220 lb 14.4 oz)     Temp Readings from Last 1 Encounters:   12/18/24 97.5 °F (36.4 °C) (Axillary)     Estimated Creatinine Clearance: 147 mL/min (A) (based on SCr of 0.6 mg/dL (L)).  Recent Labs     12/15/24  1136 12/17/24  0534 12/17/24  1348   CREATININE 0.5* 0.6* 0.6*   BUN 9  --  13   WBC 6.2  --  5.0     Procalcitonin:      Pertinent Cultures:  Culture Date Source Results   12/15 Blood x 2  pending   MRSA Nasal Swab: N/A. Non-respiratory infection.    Recent vancomycin administrations                     vancomycin (VANCOCIN) 1000 mg in 200 mL IVPB (mg) 1,000 mg New Bag 12/18/24 0831     1,000 mg New Bag 12/17/24 2038    vancomycin (VANCOCIN) 1250 mg in 250 mL IVPB (mg) 1,250 mg New Bag 12/17/24 0407     1,250 mg New Bag 12/16/24 1716    vancomycin (VANCOCIN) 1500 mg in 300 mL IVPB (mg) 1,500 mg New Bag 12/16/24 0345    vancomycin (VANCOCIN) 2,500 mg in sodium chloride 0.9 % 500 mL IVPB ()  Restarted 12/15/24 1551     2,500 mg New Bag  1508                    Assessment:  Date/Time Current Dose Concentration Timing of Concentration (h) AUC   Pending           Note: Serum concentrations collected for AUC dosing may appear elevated if collected in close proximity to the dose administered, this is not necessarily an indication of toxicity    Plan:  Current dosing regimen is  Continue 1000 mg Q12H     Repeat vancomycin concentration ordered for 12/19 @ 0600   Pharmacy will continue to monitor patient and adjust therapy as indicated    Thank you for the consult,  Valerio Hendrix RPH  12/18/2024 10:12 AM

## 2024-12-18 NOTE — PROGRESS NOTES
Updated DR. Ash via perfect serve of the following:  Pt NPO for EGD today. Due for 14 units lantus. Sugar this am 144. Please advise.     Awaiting response.       Per Dr. Ash hold Lantus dosing.

## 2024-12-18 NOTE — PROGRESS NOTES
Updated AMALIA Velasquez of results of egd/recommendations per Dr. Marques. Clarification needed on the following:    Do we need to continue iv protonix drip or start po bid ? Also, saw diet. Just need to know if ok for discharge? Have her also follow up with her bariatric surgeon for further issues?    Awaiting response.     Per GI: continue ivpb protonix drip for another day. Monitor overnight for bleeding risk/make sure bleeding stops. Start patient on full diet right now.     Updated Dr. Ash of this via perfect serve.

## 2024-12-18 NOTE — ANESTHESIA POSTPROCEDURE EVALUATION
Department of Anesthesiology  Postprocedure Note    Patient: Krista Chiang  MRN: [id number]  YOB: 1980  Date of evaluation: 12/18/2024    Procedure Summary       Date: 12/18/24 Room / Location: [place] [address] / [facility]    Anesthesia Start: 1151 Anesthesia Stop: 1213    Procedure: ESOPHAGOGASTRODUODENOSCOPY BIOPSY (Esophagus) Diagnosis:       Melena      (Melena [K92.1])    Surgeons: Janet Marques MD Responsible Provider: Magdalene Elizondo MD    Anesthesia Type: general, TIVA ASA Status: 3            Anesthesia Type: No value filed.    Aston Phase I: Aston Score: 9    Aston Phase II:      Anesthesia Post Evaluation    Comments: POST- ANESTHESIA EVALUATION       Pt Name: Krista Chiang  MRN: [id number]  YOB: 1980  Date of evaluation: 12/18/2024  Time:  2:19 PM      /67   Pulse 63   Temp 97.5 °F (36.4 °C) (Axillary)   Resp 18   Ht 1.727 m (5' 7.99\")   Wt 100.2 kg (220 lb 14.4 oz)   SpO2 100%   BMI 33.60 kg/m²      Consciousness Level  Awake  Cardiopulmonary Status  Stable  Pain Adequately Treated YES  Nausea / Vomiting  NO  Adequate Hydration  YES  Anesthesia Related Complications NONE      Electronically signed by Magdalene Elizondo MD on 12/18/2024 at 2:19 PM      No notable events documented.

## 2024-12-18 NOTE — PROGRESS NOTES
Physical Therapy Cancel Note      DATE: 2024    NAME: Krista Chiang  MRN: [id number]   : 1980      Patient not seen this date for Physical Therapy due to:    Patient Declined: Attempt @ 1325, Pt regretfully declining tx post procedure d/t pain and fatigue. Pt encouraged to perform supine interventions as tolerable and to attempt OOB for supper.       Electronically signed by Sushila Gupta PTA on 2024 at 3:05 PM

## 2024-12-18 NOTE — PLAN OF CARE
Problem: Chronic Conditions and Co-morbidities  Goal: Patient's chronic conditions and co-morbidity symptoms are monitored and maintained or improved  12/17/2024 2117 by Tree Ward RN  Outcome: Progressing  Flowsheets (Taken 12/17/2024 2117)  Care Plan - Patient's Chronic Conditions and Co-Morbidity Symptoms are Monitored and Maintained or Improved:   Monitor and assess patient's chronic conditions and comorbid symptoms for stability, deterioration, or improvement   Collaborate with multidisciplinary team to address chronic and comorbid conditions and prevent exacerbation or deterioration   Update acute care plan with appropriate goals if chronic or comorbid symptoms are exacerbated and prevent overall improvement and discharge     Problem: Discharge Planning  Goal: Discharge to home or other facility with appropriate resources  12/17/2024 2117 by Tree Ward RN  Outcome: Progressing  Flowsheets (Taken 12/17/2024 2117)  Discharge to home or other facility with appropriate resources:   Identify barriers to discharge with patient and caregiver   Arrange for needed discharge resources and transportation as appropriate   Identify discharge learning needs (meds, wound care, etc)     Problem: Pain  Goal: Verbalizes/displays adequate comfort level or baseline comfort level  12/17/2024 2117 by Tree Ward RN  Outcome: Progressing  Flowsheets (Taken 12/17/2024 2117)  Verbalizes/displays adequate comfort level or baseline comfort level:   Encourage patient to monitor pain and request assistance   Assess pain using appropriate pain scale   Administer analgesics based on type and severity of pain and evaluate response   Implement non-pharmacological measures as appropriate and evaluate response     Problem: Safety - Adult  Goal: Free from fall injury  12/17/2024 2117 by Tree Ward RN  Outcome: Progressing  Flowsheets (Taken 12/17/2024 2117)  Free From Fall Injury: Instruct family/caregiver on  patient safety

## 2024-12-18 NOTE — CARE COORDINATION
ONGOING DISCHARGE PLAN:    Patient is alert and oriented x4.    Spoke with patient regarding discharge plan and patient confirms that plan is still home with [facility].    IV zosyn/vanco per ID    Ortho consult-infected left hip  MRI left hip complete    GI consult-black stools  Hgb 9.9  Protonix drip    Plan for EGD today    PT/OT      Will continue to follow for additional discharge needs.    If patient is discharged prior to next notation, then this note serves as note for discharge by case management.    Electronically signed by Lynne Lui RN on 12/18/2024 at 9:33 AM

## 2024-12-19 VITALS
HEART RATE: 65 BPM | WEIGHT: 220.9 LBS | OXYGEN SATURATION: 100 % | RESPIRATION RATE: 16 BRPM | SYSTOLIC BLOOD PRESSURE: 117 MMHG | HEIGHT: 68 IN | DIASTOLIC BLOOD PRESSURE: 68 MMHG | BODY MASS INDEX: 33.48 KG/M2 | TEMPERATURE: 98.4 F

## 2024-12-19 LAB
BASOPHILS # BLD: NORMAL K/UL
BASOPHILS NFR BLD: NORMAL %
DIFFERENTIAL TYPE: NORMAL
EOSINOPHIL # BLD: NORMAL K/UL
EOSINOPHILS RELATIVE PERCENT: NORMAL %
ERYTHROCYTE [DISTWIDTH] IN BLOOD BY AUTOMATED COUNT: NORMAL %
GLUCOSE BLD-MCNC: 129 MG/DL (ref 65–105)
GLUCOSE BLD-MCNC: 239 MG/DL (ref 65–105)
GLUCOSE BLD-MCNC: 311 MG/DL (ref 65–105)
HCT VFR BLD AUTO: NORMAL %
HGB BLD-MCNC: NORMAL G/DL
IMM GRANULOCYTES # BLD AUTO: NORMAL K/UL
IMM GRANULOCYTES NFR BLD: NORMAL %
IRON SATN MFR SERPL: 7 % (ref 20–55)
IRON SERPL-MCNC: 20 UG/DL (ref 37–145)
LYMPHOCYTES NFR BLD: NORMAL K/UL
LYMPHOCYTES RELATIVE PERCENT: NORMAL %
MCH RBC QN AUTO: NORMAL PG
MCHC RBC AUTO-ENTMCNC: NORMAL G/DL
MCV RBC AUTO: NORMAL FL
MONOCYTES NFR BLD: NORMAL %
MONOCYTES NFR BLD: NORMAL K/UL
NEUTROPHILS NFR BLD: NORMAL %
NEUTS SEG NFR BLD: NORMAL K/UL
NRBC BLD-RTO: NORMAL PER 100 WBC
PLATELET # BLD AUTO: NORMAL K/UL
PLATELET ESTIMATE: NORMAL
PLATELET, FLUORESCENCE: NORMAL K/UL
PLATELETS.RETICULATED NFR BLD AUTO: NORMAL %
PMV BLD AUTO: NORMAL FL
RBC # BLD AUTO: NORMAL M/UL
RBC # BLD: NORMAL 10*6/UL
TIBC SERPL-MCNC: 270 UG/DL (ref 250–450)
UNSATURATED IRON BINDING CAPACITY: 250 UG/DL (ref 112–347)
WBC # BLD: NORMAL 10*3/UL
WBC OTHER # BLD: NORMAL K/UL

## 2024-12-19 PROCEDURE — 6360000002 HC RX W HCPCS: Performed by: INTERNAL MEDICINE

## 2024-12-19 PROCEDURE — 6370000000 HC RX 637 (ALT 250 FOR IP): Performed by: INTERNAL MEDICINE

## 2024-12-19 PROCEDURE — 2580000003 HC RX 258: Performed by: INTERNAL MEDICINE

## 2024-12-19 PROCEDURE — 6370000000 HC RX 637 (ALT 250 FOR IP): Performed by: NURSE PRACTITIONER

## 2024-12-19 PROCEDURE — 85025 COMPLETE CBC W/AUTO DIFF WBC: CPT

## 2024-12-19 PROCEDURE — 82947 ASSAY GLUCOSE BLOOD QUANT: CPT

## 2024-12-19 PROCEDURE — 99231 SBSQ HOSP IP/OBS SF/LOW 25: CPT | Performed by: INTERNAL MEDICINE

## 2024-12-19 PROCEDURE — 99232 SBSQ HOSP IP/OBS MODERATE 35: CPT | Performed by: INTERNAL MEDICINE

## 2024-12-19 PROCEDURE — 6360000002 HC RX W HCPCS

## 2024-12-19 PROCEDURE — 97530 THERAPEUTIC ACTIVITIES: CPT

## 2024-12-19 PROCEDURE — 97116 GAIT TRAINING THERAPY: CPT

## 2024-12-19 PROCEDURE — APPSS30 APP SPLIT SHARED TIME 16-30 MINUTES: Performed by: NURSE PRACTITIONER

## 2024-12-19 PROCEDURE — 2500000003 HC RX 250 WO HCPCS: Performed by: INTERNAL MEDICINE

## 2024-12-19 RX ORDER — FONDAPARINUX SODIUM 7.5 MG/.6ML
7.5 INJECTION SUBCUTANEOUS DAILY
Qty: 3 ML | Refills: 0 | Status: SHIPPED | OUTPATIENT
Start: 2024-12-19

## 2024-12-19 RX ORDER — DOXYCYCLINE 100 MG/1
100 CAPSULE ORAL EVERY 12 HOURS SCHEDULED
Qty: 56 CAPSULE | Refills: 0 | Status: SHIPPED | OUTPATIENT
Start: 2024-12-19 | End: 2025-01-16

## 2024-12-19 RX ORDER — FLUCONAZOLE 200 MG/1
200 TABLET ORAL DAILY
Qty: 3 TABLET | Refills: 0 | Status: SHIPPED | OUTPATIENT
Start: 2024-12-20 | End: 2024-12-23

## 2024-12-19 RX ORDER — DOXYCYCLINE 100 MG/1
100 CAPSULE ORAL EVERY 12 HOURS SCHEDULED
Status: DISCONTINUED | OUTPATIENT
Start: 2024-12-19 | End: 2024-12-19 | Stop reason: HOSPADM

## 2024-12-19 RX ORDER — PANTOPRAZOLE SODIUM 40 MG/1
40 TABLET, DELAYED RELEASE ORAL
Status: DISCONTINUED | OUTPATIENT
Start: 2024-12-19 | End: 2024-12-19 | Stop reason: HOSPADM

## 2024-12-19 RX ORDER — OXYCODONE AND ACETAMINOPHEN 5; 325 MG/1; MG/1
1 TABLET ORAL EVERY 4 HOURS PRN
Qty: 42 TABLET | Refills: 0 | Status: SHIPPED | OUTPATIENT
Start: 2024-12-19 | End: 2024-12-26

## 2024-12-19 RX ADMIN — DOXYCYCLINE 100 MG: 100 CAPSULE ORAL at 12:43

## 2024-12-19 RX ADMIN — CYANOCOBALAMIN TAB 1000 MCG 1000 MCG: 1000 TAB at 08:18

## 2024-12-19 RX ADMIN — DICYCLOMINE HYDROCHLORIDE 10 MG: 10 CAPSULE ORAL at 16:45

## 2024-12-19 RX ADMIN — OXYCODONE HYDROCHLORIDE AND ACETAMINOPHEN 2 TABLET: 5; 325 TABLET ORAL at 08:17

## 2024-12-19 RX ADMIN — OXYCODONE HYDROCHLORIDE AND ACETAMINOPHEN 2 TABLET: 5; 325 TABLET ORAL at 16:48

## 2024-12-19 RX ADMIN — SODIUM CHLORIDE 8 MG/HR: 9 INJECTION, SOLUTION INTRAVENOUS at 04:52

## 2024-12-19 RX ADMIN — OXYCODONE HYDROCHLORIDE AND ACETAMINOPHEN 2 TABLET: 5; 325 TABLET ORAL at 12:44

## 2024-12-19 RX ADMIN — HYDROMORPHONE HYDROCHLORIDE 0.5 MG: 1 INJECTION, SOLUTION INTRAMUSCULAR; INTRAVENOUS; SUBCUTANEOUS at 01:14

## 2024-12-19 RX ADMIN — FLUOXETINE HYDROCHLORIDE 40 MG: 20 CAPSULE ORAL at 08:17

## 2024-12-19 RX ADMIN — DICYCLOMINE HYDROCHLORIDE 10 MG: 10 CAPSULE ORAL at 05:34

## 2024-12-19 RX ADMIN — GABAPENTIN 300 MG: 300 CAPSULE ORAL at 14:10

## 2024-12-19 RX ADMIN — SODIUM CHLORIDE, PRESERVATIVE FREE 10 ML: 5 INJECTION INTRAVENOUS at 08:19

## 2024-12-19 RX ADMIN — PANTOPRAZOLE SODIUM 40 MG: 40 TABLET, DELAYED RELEASE ORAL at 14:10

## 2024-12-19 RX ADMIN — BUPROPION HYDROCHLORIDE 150 MG: 150 TABLET, EXTENDED RELEASE ORAL at 08:17

## 2024-12-19 RX ADMIN — PIPERACILLIN AND TAZOBACTAM 3375 MG: 3; .375 INJECTION, POWDER, LYOPHILIZED, FOR SOLUTION INTRAVENOUS at 03:00

## 2024-12-19 RX ADMIN — FLUCONAZOLE 200 MG: 100 TABLET ORAL at 08:18

## 2024-12-19 RX ADMIN — OXYCODONE HYDROCHLORIDE AND ACETAMINOPHEN 2 TABLET: 5; 325 TABLET ORAL at 02:49

## 2024-12-19 RX ADMIN — INSULIN LISPRO 2 UNITS: 100 INJECTION, SOLUTION INTRAVENOUS; SUBCUTANEOUS at 11:59

## 2024-12-19 RX ADMIN — Medication 2000 UNITS: at 08:17

## 2024-12-19 RX ADMIN — DILTIAZEM HYDROCHLORIDE 120 MG: 120 CAPSULE, COATED, EXTENDED RELEASE ORAL at 08:17

## 2024-12-19 RX ADMIN — HYDROMORPHONE HYDROCHLORIDE 0.5 MG: 1 INJECTION, SOLUTION INTRAMUSCULAR; INTRAVENOUS; SUBCUTANEOUS at 05:33

## 2024-12-19 RX ADMIN — INSULIN GLARGINE 14 UNITS: 100 INJECTION, SOLUTION SUBCUTANEOUS at 08:18

## 2024-12-19 RX ADMIN — ONDANSETRON 4 MG: 2 INJECTION, SOLUTION INTRAMUSCULAR; INTRAVENOUS at 15:05

## 2024-12-19 RX ADMIN — INSULIN LISPRO 6 UNITS: 100 INJECTION, SOLUTION INTRAVENOUS; SUBCUTANEOUS at 16:45

## 2024-12-19 RX ADMIN — DICYCLOMINE HYDROCHLORIDE 10 MG: 10 CAPSULE ORAL at 10:34

## 2024-12-19 RX ADMIN — GABAPENTIN 300 MG: 300 CAPSULE ORAL at 08:17

## 2024-12-19 RX ADMIN — VANCOMYCIN 1000 MG: 1 INJECTION, SOLUTION INTRAVENOUS at 08:29

## 2024-12-19 ASSESSMENT — PAIN DESCRIPTION - LOCATION
LOCATION: HIP
LOCATION: HIP;LEG

## 2024-12-19 ASSESSMENT — PAIN - FUNCTIONAL ASSESSMENT
PAIN_FUNCTIONAL_ASSESSMENT: ACTIVITIES ARE NOT PREVENTED
PAIN_FUNCTIONAL_ASSESSMENT: PREVENTS OR INTERFERES SOME ACTIVE ACTIVITIES AND ADLS

## 2024-12-19 ASSESSMENT — PAIN SCALES - GENERAL
PAINLEVEL_OUTOF10: 7
PAINLEVEL_OUTOF10: 10
PAINLEVEL_OUTOF10: 7
PAINLEVEL_OUTOF10: 5
PAINLEVEL_OUTOF10: 7
PAINLEVEL_OUTOF10: 5
PAINLEVEL_OUTOF10: 8
PAINLEVEL_OUTOF10: 5
PAINLEVEL_OUTOF10: 5
PAINLEVEL_OUTOF10: 7
PAINLEVEL_OUTOF10: 9

## 2024-12-19 ASSESSMENT — PAIN DESCRIPTION - DESCRIPTORS
DESCRIPTORS: BURNING;ACHING;DISCOMFORT;SHARP
DESCRIPTORS: SHARP;THROBBING
DESCRIPTORS: DISCOMFORT
DESCRIPTORS: ACHING;SHARP
DESCRIPTORS: PRESSURE
DESCRIPTORS: SHARP

## 2024-12-19 ASSESSMENT — PAIN DESCRIPTION - ORIENTATION
ORIENTATION: LEFT

## 2024-12-19 ASSESSMENT — PAIN SCALES - WONG BAKER: WONGBAKER_NUMERICALRESPONSE: NO HURT

## 2024-12-19 NOTE — CARE COORDINATION
ONGOING DISCHARGE PLAN:    Patient is alert and oriented x4.    Spoke with patient regarding discharge plan and patient confirms that plan is still home with [facility].    P.o. doxycycline through 1/19/2024 per ID  Oral diflucan    Ortho consult-infected left hip  MRI left hip complete    GI consult-black stools  Hgb 10.2  Protonix drip  Full liquid diet    POD #1 EGD-2 large ulcers    PT/OT      Will continue to follow for additional discharge needs.    If patient is discharged prior to next notation, then this note serves as note for discharge by case management.    Electronically signed by Lynne Lui RN on 12/19/2024 at 9:42 AM

## 2024-12-19 NOTE — PROGRESS NOTES
[facility]    Gastroenterology Daily Progress Note      Patient:   Krista Chiang   :    1980   Facility[facility]  Date:     2024  Consultant:   BERNARDA Ryan - CNP, CNP      SUBJECTIVE  44 y.o. female admitted 12/15/2024 with Left hip postoperative wound infection [T81.49XA]  Postoperative wound infection of left hip [T81.49XA] and seen for melena. The pt was seen and examined. She is s/p egd yesterday that is discussed below. Hgb 10.2. no c/o abdominal pain or nausea, had \"brown stool with black spots \"in it. .        OBJECTIVE  Scheduled Meds:   doxycycline monohydrate  100 mg Oral 2 times per day    Vitamin D  2,000 Units Oral Daily    insulin glargine  0.15 Units/kg SubCUTAneous Daily    insulin lispro  0-8 Units SubCUTAneous 4x Daily AC & HS    [Held by provider] fondaparinux  7.5 mg SubCUTAneous Daily    fluconazole  200 mg Oral Daily    buPROPion  150 mg Oral QAM    cholestyramine light  1 packet Oral Daily    dilTIAZem  120 mg Oral Daily    cyanocobalamin  1,000 mcg Oral Daily    dicyclomine  10 mg Oral 4x Daily AC & HS    FLUoxetine  40 mg Oral Daily    gabapentin  300 mg Oral TID    pravastatin  40 mg Oral Nightly    sodium chloride flush  5-40 mL IntraVENous 2 times per day       Vital Signs:  BP (!) 131/57   Pulse 71   Temp 97.7 °F (36.5 °C) (Oral)   Resp 16   Ht 1.727 m (5' 7.99\")   Wt 100.2 kg (220 lb 14.4 oz)   SpO2 100%   BMI 33.60 kg/m²    Review of Systems - History obtained from chart review and the patient  General ROS: negative  Respiratory ROS: no cough, shortness of breath, or wheezing  Cardiovascular ROS: no chest pain or dyspnea on exertion  Gastrointestinal ROS: positive for - blood in stools   Physical Exam:     General Appearance: alert and oriented to person, place and time, well-developed and well-nourished, in no acute distress  Skin: warm and dry, no rash or erythema  Head: normocephalic and atraumatic  Eyes: pupils

## 2024-12-19 NOTE — PLAN OF CARE
Problem: Chronic Conditions and Co-morbidities  Goal: Patient's chronic conditions and co-morbidity symptoms are monitored and maintained or improved  Outcome: Progressing  Flowsheets (Taken 12/17/2024 2117 by Tree Ward, RN)  Care Plan - Patient's Chronic Conditions and Co-Morbidity Symptoms are Monitored and Maintained or Improved:   Monitor and assess patient's chronic conditions and comorbid symptoms for stability, deterioration, or improvement   Collaborate with multidisciplinary team to address chronic and comorbid conditions and prevent exacerbation or deterioration   Update acute care plan with appropriate goals if chronic or comorbid symptoms are exacerbated and prevent overall improvement and discharge     Problem: Pain  Goal: Verbalizes/displays adequate comfort level or baseline comfort level  Outcome: Progressing  Flowsheets (Taken 12/17/2024 2117 by Tree Ward, RN)  Verbalizes/displays adequate comfort level or baseline comfort level:   Encourage patient to monitor pain and request assistance   Assess pain using appropriate pain scale   Administer analgesics based on type and severity of pain and evaluate response   Implement non-pharmacological measures as appropriate and evaluate response     Problem: Safety - Adult  Goal: Free from fall injury  Outcome: Progressing  Flowsheets (Taken 12/18/2024 0802 by Neha Levine, RN)  Free From Fall Injury: Instruct family/caregiver on patient safety     Problem: Infection - Adult  Goal: Absence of infection at discharge  Outcome: Progressing  Flowsheets (Taken 12/19/2024 0622)  Absence of infection at discharge:   Assess and monitor for signs and symptoms of infection   Monitor all insertion sites i.e., indwelling lines, tubes and drains   Monitor lab/diagnostic results     Problem: Nutrition Deficit:  Goal: Optimize nutritional status  Outcome: Progressing  Flowsheets (Taken 12/19/2024 0622)  Nutrient intake appropriate for improving,

## 2024-12-19 NOTE — PROGRESS NOTES
Physical Therapy  [facility]   Physical Therapy Treatment  Date: 24  Patient Name: Krista Chiang       Room: 8/2118-01  MRN: [id number]  Account: [id number]   : 1980  (44 y.o.) Gender: female     Discharge Recommendations:  Further Physical Therapy is recommended upon facility discharge    PT D/C Equipment  Other: pt has RW and manual WC  PT Equipment Recommendations  Equipment Needed: No  Other: pt has RW and manual WC    General  Chart Reviewed: Yes  Patient assessed for rehabilitation services?: Yes  Additional Pertinent Hx: Krista Chiang is a 44 y.o. female with history of alcohol abuse, Painting's esophagus, history of bariatric surgery, pulmonary embolism anticoagulated with fondaparinux, blood clots, hypertension, MRSA, pernicious anemia, SVT, and type 2 diabetes mellitus who presents with a fall and head injury (States vision went dark and lost balance upon standing up from chair and fell and hit her head.) She is admitted to the hospital for the management of Left hip postoperative wound infection. Pt reoprts feeling unwell for about a week and has been experiencing nausea and vomiting since Tuesday since she started doxycycline for a hip infection. She has been in skilled nursing care since her past operation to remove infected hardware from her hip, and she has been home for only 1 week. Upon presentation to the ER, the patient was alert and oriented.  She was mildly hypertensive and tachycardic Imaging was concerning for ongoing infection in the left hip space, and joint aspiration should be considered.  CT head and CT spine were both Negative for acute process  Response To Previous Treatment: Patient with no complaints from previous session.  Family/Caregiver Present: No  Referring Practitioner: Wilner Ash MD  Referral Date : 12/15/24  Diagnosis: Left hip postoperative wound infection  Follows Commands: Within Functional Limits    Past Medical History:  OR GREATER THAN 5 YEARS (4/24/2013); and Upper gastrointestinal endoscopy (N/A, 12/18/2024).    Restrictions  Restrictions/Precautions  Restrictions/Precautions: Contact Precautions, Fall Risk  Activity Level: Up with Assist  Required Braces or Orthoses?: No       Subjective  Subjective  Subjective: Pt lying in bed upon arrival, agreeable to thrapy   General  General Comments: RN approved therapy; Hx of LLE girdlestone procedure (No Left Hip joint), Significant Leg length discrepancy (Left shorter than Right)             Objective  Orientation  Overall Orientation Status: Within Functional Limits          Transfers  Transfers  Sit to Stand: Contact guard assistance  Stand to Sit: Contact guard assistance  Bed to Chair: Contact guard assistance  Stand Pivot Transfers: Contact guard assistance  Comment: Zach MAK for transfers.     Ambulation      Ambulation  Surface: Level tile  Device: Rolling Walker  Assistance: Stand by assistance  Quality of Gait: LLE forefoot contact only, with Heavy reliance on BUEs during Left stance (d/t leg length discrepancy and pain during Left stance)  Gait Deviations: Decreased step length, Decreased step height  Distance: 8ft, 40ft  Comments: no LOB noted     Stairs  Stairs/Curb  Stairs?: No    Bed Mobility  Bed mobility  Supine to Sit: Modified independent  Sit to Supine: Unable to assess  Scooting: Independent  Bed Mobility Comments: HOB slightly elevated. IND sitting balance at EOB.     Exercises completed:    Exercise 1: bed mobility x1  Exercise 2: STS x2 from bed, x1 from toilet with RW and CGA x1  Exercise 3: toilet transfer x1- pt able to don/doff brief and complete pericare SBA   Exercise 4: standing dynamic balance while pt washed hands at sink- SBA     Functional Outcome Measures  AM-PAC Basic Mobility - Inpatient   How much help is needed turning from your back to your side while in a flat bed without using bedrails?: A Little  How much help is needed moving from lying on your
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
## 2024-12-19 NOTE — PROGRESS NOTES
IV and heart monitor removed. All pt belongings collected, discharge instructions explained to patient and daughter including follow up visits, stated understanding. Pt to be wheeled out.

## 2024-12-19 NOTE — PROGRESS NOTES
[facility]   OCCUPATIONAL THERAPY MISSED TREATMENT NOTE   INPATIENT   Date: 24  Patient Name: Krista Chiang       Room:   MRN: [id number]   Account #: [id number]    : 1980  (44 y.o.)  Gender: female   Referring Practitioner: Wilner Ash MD  Diagnosis: Left hip postoperative infection             REASON FOR MISSED TREATMENT:  Patient declined  -    Writer entered patients room and introduced self and role. Patient politely declined due to feeling nausea. Patient given Zofran prior to arrival by RN per patient report. OT will continue to follow and check back as time permits. [phone]      Electronically signed by KATHLEEN Fabian on 24 at 3:22 PM EST

## 2024-12-20 ENCOUNTER — CARE COORDINATION (OUTPATIENT)
Dept: CARE COORDINATION | Age: 44
End: 2024-12-20

## 2024-12-20 LAB
MICROORGANISM SPEC CULT: NORMAL
MICROORGANISM SPEC CULT: NORMAL
SERVICE CMNT-IMP: NORMAL
SERVICE CMNT-IMP: NORMAL
SPECIMEN DESCRIPTION: NORMAL
SPECIMEN DESCRIPTION: NORMAL
SURGICAL PATHOLOGY REPORT: NORMAL

## 2024-12-20 RX ORDER — DOXYCYCLINE 100 MG/1
100 CAPSULE ORAL EVERY 12 HOURS
Qty: 180 CAPSULE | OUTPATIENT
Start: 2024-12-20

## 2024-12-20 NOTE — CARE COORDINATION
Care Transitions Note    Initial Call - Call within 2 business days of discharge: Yes    Patient Current Location:  [place]    Care Transition Nurse contacted the patient by telephone to perform post hospital discharge assessment, verified name and  as identifiers. Provided introduction to self, and explanation of the Care Transition Nurse role.     Patient: Krista Chiang    Patient : 1980   MRN: [id number]    Reason for Admission: Fall postoperative lt hip infection  Discharge Date: 24  RURS: Readmission Risk Score: 19.9      Last Discharge Facility       Date Complaint Diagnosis Description Type Department Provider    12/15/24 Eye Problem; Head Injury; Fall Postoperative wound infection of left hip ... ED to Hosp-Admission (Discharged) (ADMITTED) [facility] Wilner Castillo MD; Lalita Lundy..            Was this an external facility discharge? No    Additional needs identified to be addressed with provider   No needs identified             Method of communication with provider: none.    Patients top risk factors for readmission: functional physical ability, lack of knowledge about disease, and medical condition-DM, wound infection , HTN    Interventions to address risk factors:   Review of patient management of conditions/medications: disharge    Care Summary Note: Was able to contact [facility] for initial transitional outreach.  She stated that she was doing \"good\".  She said that her epigastric pain was better, and lt hip pain was tolerable.  She was admitted due to fall at home.  She had N/V for a few days and when she went to stand up she blacked out and fell.  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   She was sent home on antibiotics for the infection.  Reviewed medications and she stated that she had all her medications.  She has follow up with PCP and ortho on .  Eugenio at discharge.  She did ask about going to [facility]

## 2024-12-22 ENCOUNTER — APPOINTMENT (OUTPATIENT)
Dept: CT IMAGING | Age: 44
End: 2024-12-22
Payer: COMMERCIAL

## 2024-12-22 ENCOUNTER — HOSPITAL ENCOUNTER (EMERGENCY)
Age: 44
Discharge: HOME | End: 2024-12-22
Attending: STUDENT IN AN ORGANIZED HEALTH CARE EDUCATION/TRAINING PROGRAM
Payer: COMMERCIAL

## 2024-12-22 VITALS
BODY MASS INDEX: 32.58 KG/M2 | TEMPERATURE: 98.4 F | DIASTOLIC BLOOD PRESSURE: 58 MMHG | RESPIRATION RATE: 16 BRPM | SYSTOLIC BLOOD PRESSURE: 142 MMHG | WEIGHT: 220 LBS | OXYGEN SATURATION: 96 % | HEIGHT: 69 IN | HEART RATE: 60 BPM

## 2024-12-22 DIAGNOSIS — R11.2 NAUSEA AND VOMITING, UNSPECIFIED VOMITING TYPE: Primary | ICD-10-CM

## 2024-12-22 LAB
ALBUMIN SERPL-MCNC: 2.9 G/DL (ref 3.5–5.2)
ALP SERPL-CCNC: 179 U/L (ref 35–104)
ALT SERPL-CCNC: 10 U/L (ref 10–35)
ANION GAP SERPL CALCULATED.3IONS-SCNC: 12 MMOL/L (ref 9–16)
AST SERPL-CCNC: 19 U/L (ref 10–35)
BASOPHILS # BLD: 0 K/UL (ref 0–0.2)
BASOPHILS NFR BLD: 1 % (ref 0–2)
BILIRUB SERPL-MCNC: <0.2 MG/DL (ref 0–1.2)
BUN SERPL-MCNC: 11 MG/DL (ref 6–20)
CALCIUM SERPL-MCNC: 8.7 MG/DL (ref 8.6–10.4)
CHLORIDE SERPL-SCNC: 104 MMOL/L (ref 98–107)
CO2 SERPL-SCNC: 19 MMOL/L (ref 20–31)
CREAT SERPL-MCNC: 0.5 MG/DL (ref 0.7–1.2)
CRP SERPL HS-MCNC: 16 MG/L (ref 0–5)
DATE, STOOL #1: NORMAL
EOSINOPHIL # BLD: 0.1 K/UL (ref 0–0.4)
EOSINOPHILS RELATIVE PERCENT: 1 % (ref 0–4)
ERYTHROCYTE [DISTWIDTH] IN BLOOD BY AUTOMATED COUNT: 17.1 % (ref 11.5–14.9)
ERYTHROCYTE [SEDIMENTATION RATE] IN BLOOD BY PHOTOMETRIC METHOD: 27 MM/HR (ref 0–20)
GFR, ESTIMATED: >90 ML/MIN/1.73M2
GLUCOSE SERPL-MCNC: 365 MG/DL (ref 74–99)
HCT VFR BLD AUTO: 32.7 % (ref 36–46)
HEMOCCULT SP1 STL QL: NEGATIVE
HGB BLD-MCNC: 10.2 G/DL (ref 12–16)
LIPASE SERPL-CCNC: 7 U/L (ref 13–60)
LYMPHOCYTES NFR BLD: 1.2 K/UL (ref 1–4.8)
LYMPHOCYTES RELATIVE PERCENT: 25 % (ref 24–44)
MCH RBC QN AUTO: 24.7 PG (ref 26–34)
MCHC RBC AUTO-ENTMCNC: 31.2 G/DL (ref 31–37)
MCV RBC AUTO: 79.3 FL (ref 80–100)
MONOCYTES NFR BLD: 0.2 K/UL (ref 0.1–1.3)
MONOCYTES NFR BLD: 5 % (ref 1–7)
NEUTROPHILS NFR BLD: 68 % (ref 36–66)
NEUTS SEG NFR BLD: 3.4 K/UL (ref 1.3–9.1)
PLATELET # BLD AUTO: 274 K/UL (ref 150–450)
PMV BLD AUTO: 11.3 FL (ref 6–12)
POTASSIUM SERPL-SCNC: 4.4 MMOL/L (ref 3.7–5.3)
PROT SERPL-MCNC: 6.3 G/DL (ref 6.6–8.7)
RBC # BLD AUTO: 4.12 M/UL (ref 4–5.2)
SODIUM SERPL-SCNC: 135 MMOL/L (ref 136–145)
TIME, STOOL #1: 1600
WBC OTHER # BLD: 4.9 K/UL (ref 3.5–11)

## 2024-12-22 PROCEDURE — 74177 CT ABD & PELVIS W/CONTRAST: CPT

## 2024-12-22 PROCEDURE — 36415 COLL VENOUS BLD VENIPUNCTURE: CPT

## 2024-12-22 PROCEDURE — 85652 RBC SED RATE AUTOMATED: CPT

## 2024-12-22 PROCEDURE — 82270 OCCULT BLOOD FECES: CPT

## 2024-12-22 PROCEDURE — 2580000003 HC RX 258

## 2024-12-22 PROCEDURE — 85025 COMPLETE CBC W/AUTO DIFF WBC: CPT

## 2024-12-22 PROCEDURE — 6370000000 HC RX 637 (ALT 250 FOR IP)

## 2024-12-22 PROCEDURE — 80053 COMPREHEN METABOLIC PANEL: CPT

## 2024-12-22 PROCEDURE — 2500000003 HC RX 250 WO HCPCS

## 2024-12-22 PROCEDURE — 96375 TX/PRO/DX INJ NEW DRUG ADDON: CPT

## 2024-12-22 PROCEDURE — 83690 ASSAY OF LIPASE: CPT

## 2024-12-22 PROCEDURE — 96372 THER/PROPH/DIAG INJ SC/IM: CPT

## 2024-12-22 PROCEDURE — 6360000002 HC RX W HCPCS

## 2024-12-22 PROCEDURE — 86140 C-REACTIVE PROTEIN: CPT

## 2024-12-22 PROCEDURE — 6360000002 HC RX W HCPCS: Performed by: STUDENT IN AN ORGANIZED HEALTH CARE EDUCATION/TRAINING PROGRAM

## 2024-12-22 PROCEDURE — 96374 THER/PROPH/DIAG INJ IV PUSH: CPT

## 2024-12-22 PROCEDURE — 6360000004 HC RX CONTRAST MEDICATION

## 2024-12-22 PROCEDURE — 99285 EMERGENCY DEPT VISIT HI MDM: CPT

## 2024-12-22 RX ORDER — SODIUM CHLORIDE 0.9 % (FLUSH) 0.9 %
10 SYRINGE (ML) INJECTION PRN
Status: COMPLETED | OUTPATIENT
Start: 2024-12-22 | End: 2024-12-22

## 2024-12-22 RX ORDER — IOPAMIDOL 755 MG/ML
75 INJECTION, SOLUTION INTRAVASCULAR
Status: COMPLETED | OUTPATIENT
Start: 2024-12-22 | End: 2024-12-22

## 2024-12-22 RX ORDER — PROMETHAZINE HYDROCHLORIDE 25 MG/1
25 TABLET ORAL 4 TIMES DAILY PRN
Qty: 20 TABLET | Refills: 0 | Status: SHIPPED | OUTPATIENT
Start: 2024-12-22 | End: 2024-12-29

## 2024-12-22 RX ORDER — PROMETHAZINE HYDROCHLORIDE 25 MG/ML
25 INJECTION, SOLUTION INTRAMUSCULAR; INTRAVENOUS ONCE
Status: COMPLETED | OUTPATIENT
Start: 2024-12-22 | End: 2024-12-22

## 2024-12-22 RX ORDER — SODIUM CHLORIDE 9 MG/ML
INJECTION, SOLUTION INTRAVENOUS ONCE
Status: COMPLETED | OUTPATIENT
Start: 2024-12-22 | End: 2024-12-22

## 2024-12-22 RX ORDER — MORPHINE SULFATE 4 MG/ML
4 INJECTION, SOLUTION INTRAMUSCULAR; INTRAVENOUS ONCE
Status: COMPLETED | OUTPATIENT
Start: 2024-12-22 | End: 2024-12-22

## 2024-12-22 RX ORDER — OXYCODONE HYDROCHLORIDE 5 MG/1
5 TABLET ORAL ONCE
Status: COMPLETED | OUTPATIENT
Start: 2024-12-22 | End: 2024-12-22

## 2024-12-22 RX ORDER — ONDANSETRON 2 MG/ML
4 INJECTION INTRAMUSCULAR; INTRAVENOUS ONCE
Status: COMPLETED | OUTPATIENT
Start: 2024-12-22 | End: 2024-12-22

## 2024-12-22 RX ADMIN — ONDANSETRON 4 MG: 2 INJECTION, SOLUTION INTRAMUSCULAR; INTRAVENOUS at 16:11

## 2024-12-22 RX ADMIN — PROMETHAZINE HYDROCHLORIDE 25 MG: 25 INJECTION INTRAMUSCULAR; INTRAVENOUS at 18:29

## 2024-12-22 RX ADMIN — OXYCODONE HYDROCHLORIDE 5 MG: 5 TABLET ORAL at 18:16

## 2024-12-22 RX ADMIN — SODIUM CHLORIDE, PRESERVATIVE FREE 10 ML: 5 INJECTION INTRAVENOUS at 17:02

## 2024-12-22 RX ADMIN — MORPHINE SULFATE 4 MG: 4 INJECTION, SOLUTION INTRAMUSCULAR; INTRAVENOUS at 17:14

## 2024-12-22 RX ADMIN — SODIUM CHLORIDE: 9 INJECTION, SOLUTION INTRAVENOUS at 17:02

## 2024-12-22 RX ADMIN — IOPAMIDOL 75 ML: 755 INJECTION, SOLUTION INTRAVENOUS at 17:02

## 2024-12-22 ASSESSMENT — PAIN DESCRIPTION - LOCATION
LOCATION: ABDOMEN;HIP

## 2024-12-22 ASSESSMENT — PAIN - FUNCTIONAL ASSESSMENT: PAIN_FUNCTIONAL_ASSESSMENT: 0-10

## 2024-12-22 ASSESSMENT — PAIN SCALES - GENERAL
PAINLEVEL_OUTOF10: 6
PAINLEVEL_OUTOF10: 7
PAINLEVEL_OUTOF10: 8

## 2024-12-22 NOTE — ED PROVIDER NOTES
[facility] EMERGENCY DEPARTMENT  Emergency Department  Faculty Attestation       I performed a history and physical examination of the patient and discussed management with the resident. I reviewed the resident’s note and agree with the documented findings including all diagnostic interpretations and plan of care. Any areas of disagreement are noted on the chart. I was personally present for the key portions of any procedures. I have documented in the chart those procedures where I was not present during the key portions. I have reviewed the emergency nurses triage note. I agree with the chief complaint, past medical history, past surgical history, allergies, medications, social and family history as documented unless otherwise noted below. Documentation of the HPI, Physical Exam and Medical Decision Making performed by vjibkarine is based on my personal performance of the HPI, PE and MDM. For Physician Assistant/ Nurse Practitioner cases/documentation I have personally evaluated this patient and have completed at least one if not all key elements of the E/M (history, physical exam, and MDM). Additional findings are as noted.    Pertinent Comments     Primary Care Physician: Arielle Melton, APRN - NP    History: This is a 44 y.o. female who presents to the Emergency Department with complaint of    Chief Complaint   Patient presents with    Hematemesis    Rectal Bleeding    Abdominal Pain         Physical:    ED Triage Vitals [12/22/24 1559]   BP Systolic BP Percentile Diastolic BP Percentile Temp Temp src Pulse Respirations SpO2   126/81 -- -- -- -- 67 20 96 %      Height Weight - Scale         1.753 m (5' 9\") 99.8 kg (220 lb)              RADIOLOGY:  No results found.    MDM/Plan:   Hematemesis, rectal bleeding, abdominal pain.  Recently admitted for superficial wound to the left hip.  Extensive surgery has been done to the left hip previously.  Was discharged on doxycycline by infectious disease.  Does 
times daily (before meals and nightly)    ProviderArsen MD   omeprazole (PRILOSEC) 20 MG delayed release capsule Take 1 capsule by mouth daily    Arsen Owens MD   buPROPion (WELLBUTRIN XL) 150 MG extended release tablet Take 1 tablet by mouth every morning 12/9/23   Shade Griffith MD   FLUoxetine (PROZAC) 40 MG capsule Take 1 capsule by mouth daily 8/26/23   June Black MD   Syringe/Needle, Disp, (SYRINGE 3CC/25GX1\") 25G X 1\" 3 ML MISC To be used with B12 injections monthly 2/13/21   Sandy Rogel MD   pravastatin (PRAVACHOL) 40 MG tablet TAKE 1 TABLET(40 MG) BY MOUTH DAILY  Patient taking differently: Take 1 tablet by mouth at bedtime 6/26/20   Sandy Rogel MD   glucose monitoring kit (FREESTYLE) monitoring kit Testing twice a day.  Please dispense according to patients insurance. 12/20/19   Sandy Rogel MD   Insulin Pen Needle 31G X 5 MM MISC 1 each by Does not apply route daily 12/20/19   Sandy Rogel MD   Lancets 30G MISC Testing twice a day.  Please dispense according to patients insurance. 12/20/19   Sandy Rogel MD         REVIEW OF SYSTEMS       Review of Systems   Respiratory:  Negative for shortness of breath.    Cardiovascular:  Negative for chest pain.   Gastrointestinal:  Positive for abdominal pain, blood in stool, nausea and vomiting.   Genitourinary:  Negative for dysuria and hematuria.   Neurological:  Negative for dizziness and light-headedness.     PHYSICAL EXAM      INITIAL VITALS:   BP (!) 142/58   Pulse 60   Resp 16   Ht 1.753 m (5' 9\")   Wt 99.8 kg (220 lb)   SpO2 96%   BMI 32.49 kg/m²     Physical Exam  Constitutional:       General: She is not in acute distress.     Appearance: Normal appearance. She is obese.   HENT:      Head: Normocephalic and atraumatic.      Right Ear: External ear normal.      Left Ear: External ear normal.      Nose: Nose normal.      Mouth/Throat:      Pharynx: Oropharynx is clear.   Eyes:

## 2024-12-22 NOTE — DISCHARGE INSTRUCTIONS
Thank you for visiting [facility] Emergency Department.    The CT of your belly was normal.  You do not have any signs of increasing infection in your leg.  You likely have a viral illness that is causing gastritis, please continue taking Zofran you can also use Phenergan as needed for nausea.  Your blood level was normal and you do not have any current rectal bleeding.  You should keep taking your prescribed Lovenox.  The vomit in the blood may be from irritation of the esophagus from the episodes of vomiting.  Please follow-up with your GI doctor.    You need to call Arielle Melton APRN - NP to make an appointment as directed for follow up.    Should you have any questions regarding your care or further treatment, please call [facility] Emergency Department at [phone].

## 2024-12-23 ENCOUNTER — CARE COORDINATION (OUTPATIENT)
Dept: CARE COORDINATION | Age: 44
End: 2024-12-23

## 2024-12-23 NOTE — CARE COORDINATION
Care Transitions Note    Follow Up Call     Patient Current Location:  Home: [address]  [place] [address]    Care Transition Nurse contacted the patient by telephone. Verified name and  as identifiers.    Additional needs identified to be addressed with provider   No needs identified                 Method of communication with provider: none.    Care Summary Note: Was able to contact [facility] for transitional outreach and ED follow up.  She went to the hospital for having some blood when she vomited and black stools.  Stool for occult stool was negative.  She is on Protonix for her gastric ulcers and is suppose to follow up with GI for possible suturing of ulcers if they do not heal.  She denied any further blood at this time.  She said that she is not eating much, but is following a soft bland diet.  She has a wound clinic appointment at [place] this Thursday and will be seeing PCP and ortho on .  NO new medications were started in Ed   She had no further questions or concerns.     Plan of care updates since last contact:  ED follow up       Advance Care Planning:   Does patient have an Advance Directive: reviewed during previous call, see note. .    Medication Review:  No changes since last call.     Remote Patient Monitoring:  Offered patient enrollment in the Remote Patient Monitoring (RPM) program for in-home monitoring: Patient is not eligible for RPM program because: affiliate provider.    Assessments:  Care Transitions Subsequent and Final Call    Subsequent and Final Calls  Do you have any ongoing symptoms?: Yes  Onset of Patient-reported symptoms: In the past 7 days  Patient-reported symptoms: Abdominal Pain  Have your medications changed?: No  Do you have any questions related to your medications?: No  Do you currently have any active services?: Yes  Are you currently active with any services?: Home Health  Do you have any needs or concerns that I can assist you with?: No  Care

## 2024-12-27 DIAGNOSIS — K25.4 GASTRIC ULCER WITH HEMORRHAGE, UNSPECIFIED CHRONICITY: ICD-10-CM

## 2024-12-27 DIAGNOSIS — K25.4 GASTRIC ULCER WITH HEMORRHAGE, UNSPECIFIED CHRONICITY: Primary | ICD-10-CM

## 2024-12-27 DIAGNOSIS — Z98.84 HISTORY OF ROUX-EN-Y GASTRIC BYPASS: Chronic | ICD-10-CM

## 2024-12-27 DIAGNOSIS — K92.1 MELENA: ICD-10-CM

## 2024-12-27 NOTE — TELEPHONE ENCOUNTER
Spoke with patient and advised of results and PPI sent to the pharmacy for her. Patient wants to schedule a procedure follow up, please call patient to schedule.

## 2024-12-27 NOTE — TELEPHONE ENCOUNTER
----- Message from SHANNAN Connor sent at 12/27/2024 12:10 PM EST -----  Patient EGD showed gastric ulcer   - needs 2x per day PPI - sending script - take 30 minutes before eating twice per day   - needs f/u EGD to document ulcer healing in 2-3 months   - avoid NSAIDs

## 2024-12-31 ENCOUNTER — CARE COORDINATION (OUTPATIENT)
Dept: CARE COORDINATION | Age: 44
End: 2024-12-31

## 2024-12-31 NOTE — CARE COORDINATION
Care Transitions Note    Follow Up Call     Patient Current Location:  Home: [address]  [place] [address]    [facility] Care Coordinator contacted the patient by telephone. Verified name and  as identifiers.    Additional needs identified to be addressed with provider   No needs identified                 Method of communication with provider: none.    Care Summary Note: Writer spoke to [facility] for follow up transitional care call. She stated that she is doing okay. She had home care visit today. She completed ortho appointment she is non weight bearing now. She will be fitted for a boot on . She had wound care appointment she stated they added orders for dressing changes which [place] will be taking care of, she received x-rays and was told to listen to her body. She is eating small, bland meals with protein. She voiced no needs or concerns at this time. Thanked writer for calling.     Plan of care updates since last contact:  Had ortho and wound care appointments        Advance Care Planning:   Does patient have an Advance Directive: reviewed during previous call, see note. .    Medication Review:  No changes since last call.     Remote Patient Monitoring:  Offered patient enrollment in the Remote Patient Monitoring (RPM) program for in-home monitoring: Patient is not eligible for RPM program because: affiliate provider.    Assessments:  Care Transitions Subsequent and Final Call    Subsequent and Final Calls  Do you have any ongoing symptoms?: No  Have your medications changed?: No  Do you have any questions related to your medications?: No  Do you currently have any active services?: Yes  Are you currently active with any services?: Home Health  Do you have any needs or concerns that I can assist you with?: No  Identified Barriers: None  Care Transitions Interventions    Physical Therapy: Completed Other Services: Completed (Comment: [facility])    Occupational Therapy: Completed     Other

## 2025-01-02 ENCOUNTER — APPOINTMENT (OUTPATIENT)
Dept: CT IMAGING | Age: 45
DRG: 863 | End: 2025-01-02
Payer: COMMERCIAL

## 2025-01-02 ENCOUNTER — HOSPITAL ENCOUNTER (INPATIENT)
Age: 45
LOS: 7 days | Discharge: HOME HEALTH | DRG: 863 | End: 2025-01-09
Attending: EMERGENCY MEDICINE | Admitting: INTERNAL MEDICINE
Payer: COMMERCIAL

## 2025-01-02 DIAGNOSIS — R10.13 EPIGASTRIC PAIN: ICD-10-CM

## 2025-01-02 DIAGNOSIS — R11.2 NAUSEA AND VOMITING, UNSPECIFIED VOMITING TYPE: Primary | ICD-10-CM

## 2025-01-02 DIAGNOSIS — K92.1 BLACK STOOL: ICD-10-CM

## 2025-01-02 DIAGNOSIS — R10.84 GENERALIZED ABDOMINAL PAIN: ICD-10-CM

## 2025-01-02 PROBLEM — R10.9 ABDOMINAL PAIN: Status: ACTIVE | Noted: 2025-01-02

## 2025-01-02 LAB
ALBUMIN SERPL-MCNC: 3.1 G/DL (ref 3.5–5.2)
ALP SERPL-CCNC: 253 U/L (ref 35–104)
ALT SERPL-CCNC: 21 U/L (ref 10–35)
ANION GAP SERPL CALCULATED.3IONS-SCNC: 13 MMOL/L (ref 9–16)
AST SERPL-CCNC: 25 U/L (ref 10–35)
BACTERIA URNS QL MICRO: ABNORMAL
BASOPHILS # BLD: 0.07 K/UL (ref 0–0.2)
BASOPHILS NFR BLD: 1 % (ref 0–2)
BILIRUB DIRECT SERPL-MCNC: 0.2 MG/DL (ref 0–0.3)
BILIRUB INDIRECT SERPL-MCNC: 0.1 MG/DL (ref 0–1)
BILIRUB SERPL-MCNC: 0.3 MG/DL (ref 0–1.2)
BILIRUB UR QL STRIP: NEGATIVE
BUN SERPL-MCNC: 8 MG/DL (ref 6–20)
CALCIUM SERPL-MCNC: 8.6 MG/DL (ref 8.6–10.4)
CHLORIDE SERPL-SCNC: 107 MMOL/L (ref 98–107)
CLARITY UR: ABNORMAL
CO2 SERPL-SCNC: 17 MMOL/L (ref 20–31)
COLOR UR: YELLOW
CREAT SERPL-MCNC: 0.5 MG/DL (ref 0.7–1.2)
EOSINOPHIL # BLD: 0 K/UL (ref 0–0.4)
EOSINOPHILS RELATIVE PERCENT: 0 % (ref 0–4)
EPI CELLS #/AREA URNS HPF: ABNORMAL /HPF
ERYTHROCYTE [DISTWIDTH] IN BLOOD BY AUTOMATED COUNT: 18 % (ref 11.5–14.9)
GFR, ESTIMATED: >90 ML/MIN/1.73M2
GLUCOSE SERPL-MCNC: 305 MG/DL (ref 74–99)
GLUCOSE UR STRIP-MCNC: ABNORMAL MG/DL
HCT VFR BLD AUTO: 39.6 % (ref 36–46)
HGB BLD-MCNC: 12.7 G/DL (ref 12–16)
HGB UR QL STRIP.AUTO: NEGATIVE
INR PPP: 1
KETONES UR STRIP-MCNC: ABNORMAL MG/DL
LEUKOCYTE ESTERASE UR QL STRIP: NEGATIVE
LIPASE SERPL-CCNC: 18 U/L (ref 13–60)
LYMPHOCYTES NFR BLD: 1.31 K/UL (ref 1–4.8)
LYMPHOCYTES RELATIVE PERCENT: 19 % (ref 24–44)
MCH RBC QN AUTO: 25.2 PG (ref 26–34)
MCHC RBC AUTO-ENTMCNC: 32.1 G/DL (ref 31–37)
MCV RBC AUTO: 78.5 FL (ref 80–100)
MONOCYTES NFR BLD: 0.21 K/UL (ref 0.1–1.3)
MONOCYTES NFR BLD: 3 % (ref 1–7)
MORPHOLOGY: ABNORMAL
NEUTROPHILS NFR BLD: 77 % (ref 36–66)
NEUTS SEG NFR BLD: 5.31 K/UL (ref 1.3–9.1)
NITRITE UR QL STRIP: NEGATIVE
PARTIAL THROMBOPLASTIN TIME: 20.6 SEC (ref 24–36)
PH UR STRIP: 7 [PH] (ref 5–8)
PLATELET # BLD AUTO: 400 K/UL (ref 150–450)
PMV BLD AUTO: 10.2 FL (ref 6–12)
POTASSIUM SERPL-SCNC: 3.6 MMOL/L (ref 3.7–5.3)
PROT SERPL-MCNC: 6.5 G/DL (ref 6.6–8.7)
PROT UR STRIP-MCNC: ABNORMAL MG/DL
PROTHROMBIN TIME: 13.3 SEC (ref 11.8–14.6)
RBC # BLD AUTO: 5.04 M/UL (ref 4–5.2)
RBC #/AREA URNS HPF: ABNORMAL /HPF
SODIUM SERPL-SCNC: 137 MMOL/L (ref 136–145)
SP GR UR STRIP: 1.03 (ref 1–1.03)
UROBILINOGEN UR STRIP-ACNC: NORMAL EU/DL (ref 0–1)
WBC #/AREA URNS HPF: ABNORMAL /HPF
WBC OTHER # BLD: 6.9 K/UL (ref 3.5–11)
YEAST URNS QL MICRO: ABNORMAL

## 2025-01-02 PROCEDURE — 1200000000 HC SEMI PRIVATE

## 2025-01-02 PROCEDURE — 99285 EMERGENCY DEPT VISIT HI MDM: CPT

## 2025-01-02 PROCEDURE — 85025 COMPLETE CBC W/AUTO DIFF WBC: CPT

## 2025-01-02 PROCEDURE — 93005 ELECTROCARDIOGRAM TRACING: CPT | Performed by: EMERGENCY MEDICINE

## 2025-01-02 PROCEDURE — 6360000004 HC RX CONTRAST MEDICATION: Performed by: EMERGENCY MEDICINE

## 2025-01-02 PROCEDURE — 6360000002 HC RX W HCPCS

## 2025-01-02 PROCEDURE — 96375 TX/PRO/DX INJ NEW DRUG ADDON: CPT

## 2025-01-02 PROCEDURE — 6370000000 HC RX 637 (ALT 250 FOR IP)

## 2025-01-02 PROCEDURE — 6360000002 HC RX W HCPCS: Performed by: EMERGENCY MEDICINE

## 2025-01-02 PROCEDURE — 6370000000 HC RX 637 (ALT 250 FOR IP): Performed by: EMERGENCY MEDICINE

## 2025-01-02 PROCEDURE — 81001 URINALYSIS AUTO W/SCOPE: CPT

## 2025-01-02 PROCEDURE — 80074 ACUTE HEPATITIS PANEL: CPT

## 2025-01-02 PROCEDURE — 2500000003 HC RX 250 WO HCPCS

## 2025-01-02 PROCEDURE — 2580000003 HC RX 258: Performed by: EMERGENCY MEDICINE

## 2025-01-02 PROCEDURE — 2500000003 HC RX 250 WO HCPCS: Performed by: EMERGENCY MEDICINE

## 2025-01-02 PROCEDURE — 36415 COLL VENOUS BLD VENIPUNCTURE: CPT

## 2025-01-02 PROCEDURE — 96374 THER/PROPH/DIAG INJ IV PUSH: CPT

## 2025-01-02 PROCEDURE — 83690 ASSAY OF LIPASE: CPT

## 2025-01-02 PROCEDURE — 74177 CT ABD & PELVIS W/CONTRAST: CPT

## 2025-01-02 PROCEDURE — 80048 BASIC METABOLIC PNL TOTAL CA: CPT

## 2025-01-02 PROCEDURE — 73701 CT LOWER EXTREMITY W/DYE: CPT

## 2025-01-02 PROCEDURE — 85730 THROMBOPLASTIN TIME PARTIAL: CPT

## 2025-01-02 PROCEDURE — 2580000003 HC RX 258

## 2025-01-02 PROCEDURE — 80076 HEPATIC FUNCTION PANEL: CPT

## 2025-01-02 PROCEDURE — 99223 1ST HOSP IP/OBS HIGH 75: CPT | Performed by: INTERNAL MEDICINE

## 2025-01-02 PROCEDURE — 85610 PROTHROMBIN TIME: CPT

## 2025-01-02 RX ORDER — DICYCLOMINE HYDROCHLORIDE 10 MG/1
10 CAPSULE ORAL
Status: DISCONTINUED | OUTPATIENT
Start: 2025-01-02 | End: 2025-01-09 | Stop reason: HOSPADM

## 2025-01-02 RX ORDER — IOPAMIDOL 755 MG/ML
75 INJECTION, SOLUTION INTRAVASCULAR
Status: COMPLETED | OUTPATIENT
Start: 2025-01-02 | End: 2025-01-02

## 2025-01-02 RX ORDER — POTASSIUM CHLORIDE 7.45 MG/ML
10 INJECTION INTRAVENOUS PRN
Status: DISCONTINUED | OUTPATIENT
Start: 2025-01-02 | End: 2025-01-09 | Stop reason: HOSPADM

## 2025-01-02 RX ORDER — FENTANYL CITRATE 0.05 MG/ML
25 INJECTION, SOLUTION INTRAMUSCULAR; INTRAVENOUS ONCE
Status: COMPLETED | OUTPATIENT
Start: 2025-01-02 | End: 2025-01-02

## 2025-01-02 RX ORDER — MORPHINE SULFATE 2 MG/ML
2 INJECTION, SOLUTION INTRAMUSCULAR; INTRAVENOUS EVERY 4 HOURS PRN
Status: DISCONTINUED | OUTPATIENT
Start: 2025-01-02 | End: 2025-01-09

## 2025-01-02 RX ORDER — SODIUM CHLORIDE 0.9 % (FLUSH) 0.9 %
5-40 SYRINGE (ML) INJECTION EVERY 12 HOURS SCHEDULED
Status: DISCONTINUED | OUTPATIENT
Start: 2025-01-02 | End: 2025-01-09 | Stop reason: HOSPADM

## 2025-01-02 RX ORDER — ONDANSETRON 2 MG/ML
4 INJECTION INTRAMUSCULAR; INTRAVENOUS ONCE
Status: DISCONTINUED | OUTPATIENT
Start: 2025-01-02 | End: 2025-01-02

## 2025-01-02 RX ORDER — MAGNESIUM SULFATE HEPTAHYDRATE 40 MG/ML
2000 INJECTION, SOLUTION INTRAVENOUS PRN
Status: DISCONTINUED | OUTPATIENT
Start: 2025-01-02 | End: 2025-01-09 | Stop reason: HOSPADM

## 2025-01-02 RX ORDER — SODIUM CHLORIDE 0.9 % (FLUSH) 0.9 %
5-40 SYRINGE (ML) INJECTION PRN
Status: DISCONTINUED | OUTPATIENT
Start: 2025-01-02 | End: 2025-01-09 | Stop reason: HOSPADM

## 2025-01-02 RX ORDER — ONDANSETRON 2 MG/ML
4 INJECTION INTRAMUSCULAR; INTRAVENOUS EVERY 6 HOURS PRN
Status: DISCONTINUED | OUTPATIENT
Start: 2025-01-02 | End: 2025-01-03

## 2025-01-02 RX ORDER — DILTIAZEM HYDROCHLORIDE 120 MG/1
120 CAPSULE, COATED, EXTENDED RELEASE ORAL DAILY
Status: DISCONTINUED | OUTPATIENT
Start: 2025-01-02 | End: 2025-01-09 | Stop reason: HOSPADM

## 2025-01-02 RX ORDER — ONDANSETRON 4 MG/1
4 TABLET, ORALLY DISINTEGRATING ORAL ONCE
Status: COMPLETED | OUTPATIENT
Start: 2025-01-02 | End: 2025-01-02

## 2025-01-02 RX ORDER — DOXYCYCLINE 100 MG/1
100 CAPSULE ORAL EVERY 12 HOURS SCHEDULED
Status: DISCONTINUED | OUTPATIENT
Start: 2025-01-02 | End: 2025-01-03

## 2025-01-02 RX ORDER — ONDANSETRON 2 MG/ML
4 INJECTION INTRAMUSCULAR; INTRAVENOUS ONCE
Status: COMPLETED | OUTPATIENT
Start: 2025-01-02 | End: 2025-01-02

## 2025-01-02 RX ORDER — ACETAMINOPHEN 650 MG/1
650 SUPPOSITORY RECTAL EVERY 6 HOURS PRN
Status: DISCONTINUED | OUTPATIENT
Start: 2025-01-02 | End: 2025-01-09 | Stop reason: HOSPADM

## 2025-01-02 RX ORDER — SCOPOLAMINE 1 MG/3D
1 PATCH, EXTENDED RELEASE TRANSDERMAL
Status: DISCONTINUED | OUTPATIENT
Start: 2025-01-02 | End: 2025-01-09 | Stop reason: HOSPADM

## 2025-01-02 RX ORDER — BUPROPION HYDROCHLORIDE 150 MG/1
150 TABLET ORAL EVERY MORNING
Status: DISCONTINUED | OUTPATIENT
Start: 2025-01-03 | End: 2025-01-09 | Stop reason: HOSPADM

## 2025-01-02 RX ORDER — ACETAMINOPHEN 325 MG/1
650 TABLET ORAL EVERY 6 HOURS PRN
Status: DISCONTINUED | OUTPATIENT
Start: 2025-01-02 | End: 2025-01-09 | Stop reason: HOSPADM

## 2025-01-02 RX ORDER — SODIUM CHLORIDE 0.9 % (FLUSH) 0.9 %
10 SYRINGE (ML) INJECTION PRN
Status: DISCONTINUED | OUTPATIENT
Start: 2025-01-02 | End: 2025-01-09 | Stop reason: HOSPADM

## 2025-01-02 RX ORDER — POLYETHYLENE GLYCOL 3350 17 G/17G
17 POWDER, FOR SOLUTION ORAL DAILY PRN
Status: DISCONTINUED | OUTPATIENT
Start: 2025-01-02 | End: 2025-01-09 | Stop reason: HOSPADM

## 2025-01-02 RX ORDER — GABAPENTIN 300 MG/1
300 CAPSULE ORAL 3 TIMES DAILY
Status: DISCONTINUED | OUTPATIENT
Start: 2025-01-02 | End: 2025-01-09 | Stop reason: HOSPADM

## 2025-01-02 RX ORDER — SODIUM CHLORIDE 9 MG/ML
INJECTION, SOLUTION INTRAVENOUS PRN
Status: DISCONTINUED | OUTPATIENT
Start: 2025-01-02 | End: 2025-01-09 | Stop reason: HOSPADM

## 2025-01-02 RX ORDER — POTASSIUM CHLORIDE 1500 MG/1
40 TABLET, EXTENDED RELEASE ORAL PRN
Status: DISCONTINUED | OUTPATIENT
Start: 2025-01-02 | End: 2025-01-09 | Stop reason: HOSPADM

## 2025-01-02 RX ORDER — ONDANSETRON 4 MG/1
4 TABLET, ORALLY DISINTEGRATING ORAL EVERY 8 HOURS PRN
Status: DISCONTINUED | OUTPATIENT
Start: 2025-01-02 | End: 2025-01-03

## 2025-01-02 RX ORDER — FONDAPARINUX SODIUM 7.5 MG/.6ML
7.5 INJECTION SUBCUTANEOUS DAILY
Status: DISCONTINUED | OUTPATIENT
Start: 2025-01-02 | End: 2025-01-07

## 2025-01-02 RX ORDER — 0.9 % SODIUM CHLORIDE 0.9 %
100 INTRAVENOUS SOLUTION INTRAVENOUS ONCE
Status: COMPLETED | OUTPATIENT
Start: 2025-01-02 | End: 2025-01-02

## 2025-01-02 RX ADMIN — ONDANSETRON 4 MG: 2 INJECTION, SOLUTION INTRAMUSCULAR; INTRAVENOUS at 18:20

## 2025-01-02 RX ADMIN — SODIUM CHLORIDE, PRESERVATIVE FREE 10 ML: 5 INJECTION INTRAVENOUS at 12:51

## 2025-01-02 RX ADMIN — ONDANSETRON 4 MG: 2 INJECTION, SOLUTION INTRAMUSCULAR; INTRAVENOUS at 12:25

## 2025-01-02 RX ADMIN — MORPHINE SULFATE 2 MG: 2 INJECTION, SOLUTION INTRAMUSCULAR; INTRAVENOUS at 22:12

## 2025-01-02 RX ADMIN — SODIUM CHLORIDE, PRESERVATIVE FREE 10 ML: 5 INJECTION INTRAVENOUS at 22:13

## 2025-01-02 RX ADMIN — DILTIAZEM HYDROCHLORIDE 120 MG: 120 CAPSULE, COATED, EXTENDED RELEASE ORAL at 20:17

## 2025-01-02 RX ADMIN — MORPHINE SULFATE 2 MG: 2 INJECTION, SOLUTION INTRAMUSCULAR; INTRAVENOUS at 18:13

## 2025-01-02 RX ADMIN — ONDANSETRON 4 MG: 4 TABLET, ORALLY DISINTEGRATING ORAL at 14:00

## 2025-01-02 RX ADMIN — IOPAMIDOL 75 ML: 755 INJECTION, SOLUTION INTRAVENOUS at 12:51

## 2025-01-02 RX ADMIN — PANTOPRAZOLE SODIUM 40 MG: 40 INJECTION, POWDER, LYOPHILIZED, FOR SOLUTION INTRAVENOUS at 18:16

## 2025-01-02 RX ADMIN — SODIUM CHLORIDE 100 ML: 9 INJECTION, SOLUTION INTRAVENOUS at 12:52

## 2025-01-02 RX ADMIN — FENTANYL CITRATE 25 MCG: 0.05 INJECTION, SOLUTION INTRAMUSCULAR; INTRAVENOUS at 13:05

## 2025-01-02 ASSESSMENT — PAIN DESCRIPTION - LOCATION
LOCATION: ABDOMEN

## 2025-01-02 ASSESSMENT — PAIN SCALES - GENERAL
PAINLEVEL_OUTOF10: 9
PAINLEVEL_OUTOF10: 10
PAINLEVEL_OUTOF10: 3
PAINLEVEL_OUTOF10: 8

## 2025-01-02 ASSESSMENT — PAIN DESCRIPTION - FREQUENCY: FREQUENCY: CONTINUOUS

## 2025-01-02 ASSESSMENT — PAIN DESCRIPTION - DESCRIPTORS
DESCRIPTORS: BURNING;SHARP
DESCRIPTORS: ACHING;SORE
DESCRIPTORS: BURNING;SHARP

## 2025-01-02 ASSESSMENT — PAIN DESCRIPTION - PAIN TYPE: TYPE: CHRONIC PAIN

## 2025-01-02 ASSESSMENT — PAIN DESCRIPTION - ORIENTATION: ORIENTATION: MID

## 2025-01-02 ASSESSMENT — PAIN - FUNCTIONAL ASSESSMENT: PAIN_FUNCTIONAL_ASSESSMENT: PREVENTS OR INTERFERES SOME ACTIVE ACTIVITIES AND ADLS

## 2025-01-02 ASSESSMENT — PAIN DESCRIPTION - ONSET: ONSET: ON-GOING

## 2025-01-02 NOTE — ED NOTES
Report given to PEBBLES Rocha from [employer].   Report method by phone   The following was reviewed with receiving RN:   Current vital signs:  BP (!) 168/87   Pulse 93   Temp 97.6 °F (36.4 °C) (Temporal)   Resp 16   Ht 1.727 m (5' 8\")   Wt 95.3 kg (210 lb)   SpO2 98%   BMI 31.93 kg/m²                      Any medication or safety alerts were reviewed. Any pending diagnostics and notifications were also reviewed, as well as any safety concerns or issues, abnormal labs, abnormal imaging, and abnormal assessment findings. Questions were answered.

## 2025-01-02 NOTE — H&P
MM MISC 1 each by Does not apply route daily 12/20/19   Sandy Rogel MD   Lancets 30G MISC Testing twice a day.  Please dispense according to patients insurance. 12/20/19   Sandy Rogel MD        Allergies:     Aripiprazole, Aspirin, Betadine [povidone iodine], Celebrex [celecoxib], Celexa [citalopram hydrobromide], Citalopram, Furosemide, Nitrofurantoin, Tricyclic antidepressants, Codeine, Lithium, Paroxetine, Paxil [paroxetine hcl], Povidone iodine, Sertraline, Tape [adhesive tape], and Tegretol [carbamazepine]    Social History:     Tobacco:    reports that she has never smoked. She has never used smokeless tobacco.  Alcohol:      reports that she does not currently use alcohol.  Drug Use:  reports no history of drug use.    Family History:     Family History   Problem Relation Age of Onset    Depression Mother     High Blood Pressure Mother     High Cholesterol Mother     Diabetes Father     Heart Disease Father     Kidney Disease Father     High Blood Pressure Father     High Cholesterol Father     Stroke Father     No Known Problems Brother     Breast Cancer Maternal Aunt     Cancer Maternal Uncle         of duodenum had whipple    Breast Cancer Maternal Cousin        Review of Systems:     Positive and Negative as described in HPI.    Review of Systems   Constitutional:  Positive for appetite change and fatigue. Negative for chills, diaphoresis, fever and unexpected weight change.   HENT:  Negative for drooling, ear discharge, ear pain, facial swelling and hearing loss.    Eyes:  Negative for pain, redness and itching.   Respiratory:  Negative for cough, choking, chest tightness and shortness of breath.    Cardiovascular:  Positive for palpitations. Negative for chest pain and leg swelling.   Gastrointestinal:  Positive for abdominal distention, abdominal pain, blood in stool and vomiting.   Genitourinary:  Negative for difficulty urinating, dyspareunia, dysuria, enuresis and flank pain.    Musculoskeletal:  Positive for back pain and gait problem.   Skin:  Positive for wound (left hip).   Neurological:  Negative for facial asymmetry, light-headedness, numbness and headaches.       Physical Exam:   BP (!) 184/97   Pulse 93   Temp 97.6 °F (36.4 °C) (Temporal)   Resp 16   Ht 1.727 m (5' 8\")   Wt 95.3 kg (210 lb)   SpO2 98%   BMI 31.93 kg/m²   Temp (24hrs), Av.6 °F (36.4 °C), Min:97.6 °F (36.4 °C), Max:97.6 °F (36.4 °C)    No results for input(s): \"POCGLU\" in the last 72 hours.  No intake or output data in the 24 hours ending 25 1728    Physical Exam  Constitutional:       Appearance: Normal appearance.   HENT:      Head: Normocephalic and atraumatic.      Nose: Nose normal.   Eyes:      Extraocular Movements: Extraocular movements intact.      Pupils: Pupils are equal, round, and reactive to light.   Abdominal:      General: There is distension.      Palpations: There is no mass.      Tenderness: There is abdominal tenderness.      Hernia: No hernia is present.      Comments: Incisions from previous surgery   Musculoskeletal:         General: No deformity or signs of injury.   Neurological:      Mental Status: She is alert and oriented to person, place, and time.         Investigations:     Laboratory Testing:  Recent Results (from the past 24 hour(s))   Protime-INR    Collection Time: 25 11:45 AM   Result Value Ref Range    Protime 13.3 11.8 - 14.6 sec    INR 1.0    Lipase    Collection Time: 25 11:45 AM   Result Value Ref Range    Lipase 18 13 - 60 U/L   Hepatic Function Panel    Collection Time: 25 11:45 AM   Result Value Ref Range    Albumin 3.1 (L) 3.5 - 5.2 g/dL    Alkaline Phosphatase 253 (H) 35 - 104 U/L    ALT 21 10 - 35 U/L    AST 25 10 - 35 U/L    Total Bilirubin 0.3 0.0 - 1.2 mg/dL    Bilirubin, Direct 0.2 0.0 - 0.3 mg/dL    Bilirubin, Indirect 0.1 0.0 - 1.0 mg/dL    Total Protein 6.5 (L) 6.6 - 8.7 g/dL   Basic Metabolic Panel    Collection Time: 25

## 2025-01-02 NOTE — ED PROVIDER NOTES
EMERGENCY DEPARTMENT ENCOUNTER    Pt Name: Krista Chiang  MRN: [id number]  Birthdate 1980  Date of evaluation: 1/2/25  CHIEF COMPLAINT       Chief Complaint   Patient presents with    Hematemesis    Rectal Bleeding    Wound Check     Very painful (does not think infection is improving)     HISTORY OF PRESENT ILLNESS   44-year-old female presenting to the ER complaining of nausea vomiting for the last 2 weeks since her GI procedure.  Patient had a peptic ulcer that was bleeding and GI did do an ablation where they stopped the bleeding.  Patient states that since then she has been having hematemesis as well as black stools.  Patient is also complaining of a wound in the left hip that was fixed/replaced in [facility].  Patient has been noticing some draining from the hip.    The history is provided by the patient.   Nausea & Vomiting  Severity:  Moderate  Duration:  2 weeks  Timing:  Constant  Associated symptoms: abdominal pain and arthralgias (L hip)    Associated symptoms: no headaches            REVIEW OF SYSTEMS     Review of Systems   Constitutional:  Negative for activity change, appetite change and fatigue.   HENT:  Negative for facial swelling, trouble swallowing and voice change.    Eyes:  Negative for photophobia and pain.   Respiratory:  Negative for chest tightness and shortness of breath.    Cardiovascular:  Negative for chest pain and palpitations.   Gastrointestinal:  Positive for abdominal pain, blood in stool, nausea and vomiting.   Genitourinary:  Negative for dysuria and urgency.   Musculoskeletal:  Positive for arthralgias (L hip). Negative for back pain.   Skin:  Negative for color change and rash.   Neurological:  Negative for dizziness, syncope and headaches.   Psychiatric/Behavioral:  Negative for behavioral problems and hallucinations.      PASTMEDICAL HISTORY     Past Medical History:   Diagnosis Date    Alcohol abuse     Recurrent episodes of withdrawal    Alcoholic hepatitis without

## 2025-01-03 ENCOUNTER — APPOINTMENT (OUTPATIENT)
Dept: ULTRASOUND IMAGING | Age: 45
DRG: 863 | End: 2025-01-03
Payer: COMMERCIAL

## 2025-01-03 PROBLEM — R11.2 NAUSEA AND VOMITING: Status: ACTIVE | Noted: 2025-01-03

## 2025-01-03 LAB
A1AT SERPL-MCNC: 154 MG/DL (ref 90–200)
AFP SERPL-MCNC: <1.8 UG/L
ALBUMIN SERPL-MCNC: 2.8 G/DL (ref 3.5–5.2)
ALP SERPL-CCNC: 237 U/L (ref 35–104)
ALT SERPL-CCNC: 40 U/L (ref 10–35)
ANION GAP SERPL CALCULATED.3IONS-SCNC: 13 MMOL/L (ref 9–16)
AST SERPL-CCNC: 105 U/L (ref 10–35)
BASOPHILS # BLD: 0.07 K/UL (ref 0–0.2)
BASOPHILS NFR BLD: 1 % (ref 0–2)
BILIRUB DIRECT SERPL-MCNC: 0.2 MG/DL (ref 0–0.3)
BILIRUB INDIRECT SERPL-MCNC: 0.1 MG/DL (ref 0–1)
BILIRUB SERPL-MCNC: 0.3 MG/DL (ref 0–1.2)
BUN SERPL-MCNC: 5 MG/DL (ref 6–20)
CALCIUM SERPL-MCNC: 8.1 MG/DL (ref 8.6–10.4)
CERULOPLASMIN SERPL-MCNC: 22 MG/DL (ref 16–45)
CHLORIDE SERPL-SCNC: 111 MMOL/L (ref 98–107)
CK SERPL-CCNC: 31 U/L (ref 26–192)
CMV IGG SERPL QL IA: <0.2
CMV IGM SERPL QL IA: 0.1
CO2 SERPL-SCNC: 19 MMOL/L (ref 20–31)
CREAT SERPL-MCNC: 0.5 MG/DL (ref 0.7–1.2)
EKG ATRIAL RATE: 98 BPM
EKG P AXIS: 61 DEGREES
EKG P-R INTERVAL: 132 MS
EKG Q-T INTERVAL: 462 MS
EKG QRS DURATION: 80 MS
EKG QTC CALCULATION (BAZETT): 589 MS
EKG R AXIS: 67 DEGREES
EKG T AXIS: 49 DEGREES
EKG VENTRICULAR RATE: 98 BPM
EOSINOPHIL # BLD: 0.13 K/UL (ref 0–0.4)
EOSINOPHILS RELATIVE PERCENT: 2 % (ref 0–4)
ERYTHROCYTE [DISTWIDTH] IN BLOOD BY AUTOMATED COUNT: 18.3 % (ref 11.5–14.9)
GFR, ESTIMATED: >90 ML/MIN/1.73M2
GLUCOSE BLD-MCNC: 376 MG/DL (ref 65–105)
GLUCOSE SERPL-MCNC: 161 MG/DL (ref 74–99)
HAV IGM SERPL QL IA: NONREACTIVE
HAV IGM SERPL QL IA: NONREACTIVE
HBV CORE IGM SERPL QL IA: NONREACTIVE
HBV CORE IGM SERPL QL IA: NONREACTIVE
HBV SURFACE AG SERPL QL IA: NONREACTIVE
HBV SURFACE AG SERPL QL IA: NONREACTIVE
HCT VFR BLD AUTO: 36.5 % (ref 36–46)
HCT VFR BLD AUTO: 38 % (ref 36–46)
HCT VFR BLD AUTO: 40 % (ref 36–46)
HCV AB SERPL QL IA: NONREACTIVE
HCV AB SERPL QL IA: NONREACTIVE
HGB BLD-MCNC: 11.4 G/DL (ref 12–16)
HGB BLD-MCNC: 12 G/DL (ref 12–16)
HGB BLD-MCNC: 12.7 G/DL (ref 12–16)
LYMPHOCYTES NFR BLD: 2.81 K/UL (ref 1–4.8)
LYMPHOCYTES RELATIVE PERCENT: 42 % (ref 24–44)
MAGNESIUM SERPL-MCNC: 1.9 MG/DL (ref 1.6–2.6)
MCH RBC QN AUTO: 25.2 PG (ref 26–34)
MCHC RBC AUTO-ENTMCNC: 31.7 G/DL (ref 31–37)
MCV RBC AUTO: 79.3 FL (ref 80–100)
MONOCYTES NFR BLD: 0.67 K/UL (ref 0.1–1.3)
MONOCYTES NFR BLD: 10 % (ref 1–7)
MORPHOLOGY: ABNORMAL
MORPHOLOGY: ABNORMAL
MYOGLOBIN SERPL-MCNC: <21 NG/ML (ref 25–58)
NEUTROPHILS NFR BLD: 45 % (ref 36–66)
NEUTS SEG NFR BLD: 3.02 K/UL (ref 1.3–9.1)
PLATELET # BLD AUTO: 385 K/UL (ref 150–450)
PMV BLD AUTO: 11.1 FL (ref 6–12)
POTASSIUM SERPL-SCNC: 3.2 MMOL/L (ref 3.7–5.3)
PROT SERPL-MCNC: 6.3 G/DL (ref 6.6–8.7)
RBC # BLD AUTO: 5.05 M/UL (ref 4–5.2)
SODIUM SERPL-SCNC: 143 MMOL/L (ref 136–145)
WBC OTHER # BLD: 6.7 K/UL (ref 3.5–11)

## 2025-01-03 PROCEDURE — 93010 ELECTROCARDIOGRAM REPORT: CPT | Performed by: INTERNAL MEDICINE

## 2025-01-03 PROCEDURE — 86038 ANTINUCLEAR ANTIBODIES: CPT

## 2025-01-03 PROCEDURE — 83735 ASSAY OF MAGNESIUM: CPT

## 2025-01-03 PROCEDURE — 86665 EPSTEIN-BARR CAPSID VCA: CPT

## 2025-01-03 PROCEDURE — 85025 COMPLETE CBC W/AUTO DIFF WBC: CPT

## 2025-01-03 PROCEDURE — 80074 ACUTE HEPATITIS PANEL: CPT

## 2025-01-03 PROCEDURE — 99222 1ST HOSP IP/OBS MODERATE 55: CPT | Performed by: STUDENT IN AN ORGANIZED HEALTH CARE EDUCATION/TRAINING PROGRAM

## 2025-01-03 PROCEDURE — 2500000003 HC RX 250 WO HCPCS

## 2025-01-03 PROCEDURE — 86645 CMV ANTIBODY IGM: CPT

## 2025-01-03 PROCEDURE — 82105 ALPHA-FETOPROTEIN SERUM: CPT

## 2025-01-03 PROCEDURE — 99222 1ST HOSP IP/OBS MODERATE 55: CPT

## 2025-01-03 PROCEDURE — 86663 EPSTEIN-BARR ANTIBODY: CPT

## 2025-01-03 PROCEDURE — 80048 BASIC METABOLIC PNL TOTAL CA: CPT

## 2025-01-03 PROCEDURE — 82103 ALPHA-1-ANTITRYPSIN TOTAL: CPT

## 2025-01-03 PROCEDURE — 82550 ASSAY OF CK (CPK): CPT

## 2025-01-03 PROCEDURE — 97162 PT EVAL MOD COMPLEX 30 MIN: CPT

## 2025-01-03 PROCEDURE — 82947 ASSAY GLUCOSE BLOOD QUANT: CPT

## 2025-01-03 PROCEDURE — 83516 IMMUNOASSAY NONANTIBODY: CPT

## 2025-01-03 PROCEDURE — 85018 HEMOGLOBIN: CPT

## 2025-01-03 PROCEDURE — 97530 THERAPEUTIC ACTIVITIES: CPT

## 2025-01-03 PROCEDURE — 82390 ASSAY OF CERULOPLASMIN: CPT

## 2025-01-03 PROCEDURE — 6370000000 HC RX 637 (ALT 250 FOR IP)

## 2025-01-03 PROCEDURE — 85014 HEMATOCRIT: CPT

## 2025-01-03 PROCEDURE — 99233 SBSQ HOSP IP/OBS HIGH 50: CPT | Performed by: INTERNAL MEDICINE

## 2025-01-03 PROCEDURE — 6360000002 HC RX W HCPCS

## 2025-01-03 PROCEDURE — 86225 DNA ANTIBODY NATIVE: CPT

## 2025-01-03 PROCEDURE — 83874 ASSAY OF MYOGLOBIN: CPT

## 2025-01-03 PROCEDURE — 2580000003 HC RX 258

## 2025-01-03 PROCEDURE — 1200000000 HC SEMI PRIVATE

## 2025-01-03 PROCEDURE — APPNB30 APP NON BILLABLE TIME 0-30 MINS: Performed by: NURSE PRACTITIONER

## 2025-01-03 PROCEDURE — 86664 EPSTEIN-BARR NUCLEAR ANTIGEN: CPT

## 2025-01-03 PROCEDURE — 36415 COLL VENOUS BLD VENIPUNCTURE: CPT

## 2025-01-03 PROCEDURE — 80076 HEPATIC FUNCTION PANEL: CPT

## 2025-01-03 PROCEDURE — 76705 ECHO EXAM OF ABDOMEN: CPT

## 2025-01-03 PROCEDURE — 86644 CMV ANTIBODY: CPT

## 2025-01-03 PROCEDURE — 86376 MICROSOMAL ANTIBODY EACH: CPT

## 2025-01-03 RX ORDER — CARVEDILOL 6.25 MG/1
6.25 TABLET ORAL 2 TIMES DAILY PRN
Status: DISCONTINUED | OUTPATIENT
Start: 2025-01-03 | End: 2025-01-09 | Stop reason: HOSPADM

## 2025-01-03 RX ORDER — DEXTROSE MONOHYDRATE 100 MG/ML
INJECTION, SOLUTION INTRAVENOUS CONTINUOUS PRN
Status: DISCONTINUED | OUTPATIENT
Start: 2025-01-03 | End: 2025-01-09 | Stop reason: HOSPADM

## 2025-01-03 RX ORDER — POTASSIUM CHLORIDE 1500 MG/1
40 TABLET, EXTENDED RELEASE ORAL ONCE
Status: COMPLETED | OUTPATIENT
Start: 2025-01-03 | End: 2025-01-03

## 2025-01-03 RX ORDER — PROMETHAZINE HYDROCHLORIDE 25 MG/1
25 TABLET ORAL EVERY 6 HOURS PRN
Status: DISCONTINUED | OUTPATIENT
Start: 2025-01-03 | End: 2025-01-09 | Stop reason: HOSPADM

## 2025-01-03 RX ORDER — SODIUM CHLORIDE 9 MG/ML
INJECTION, SOLUTION INTRAVENOUS CONTINUOUS
Status: DISCONTINUED | OUTPATIENT
Start: 2025-01-03 | End: 2025-01-09

## 2025-01-03 RX ORDER — INSULIN LISPRO 100 [IU]/ML
0-8 INJECTION, SOLUTION INTRAVENOUS; SUBCUTANEOUS
Status: DISCONTINUED | OUTPATIENT
Start: 2025-01-03 | End: 2025-01-04

## 2025-01-03 RX ADMIN — MORPHINE SULFATE 2 MG: 2 INJECTION, SOLUTION INTRAMUSCULAR; INTRAVENOUS at 02:52

## 2025-01-03 RX ADMIN — DICYCLOMINE HYDROCHLORIDE 10 MG: 10 CAPSULE ORAL at 16:43

## 2025-01-03 RX ADMIN — POTASSIUM CHLORIDE 40 MEQ: 1500 TABLET, EXTENDED RELEASE ORAL at 16:33

## 2025-01-03 RX ADMIN — GABAPENTIN 300 MG: 300 CAPSULE ORAL at 20:44

## 2025-01-03 RX ADMIN — MORPHINE SULFATE 2 MG: 2 INJECTION, SOLUTION INTRAMUSCULAR; INTRAVENOUS at 16:32

## 2025-01-03 RX ADMIN — DILTIAZEM HYDROCHLORIDE 120 MG: 120 CAPSULE, COATED, EXTENDED RELEASE ORAL at 11:33

## 2025-01-03 RX ADMIN — DOXYCYCLINE 100 MG: 100 CAPSULE ORAL at 11:34

## 2025-01-03 RX ADMIN — SODIUM CHLORIDE, PRESERVATIVE FREE 10 ML: 5 INJECTION INTRAVENOUS at 11:33

## 2025-01-03 RX ADMIN — INSULIN LISPRO 8 UNITS: 100 INJECTION, SOLUTION INTRAVENOUS; SUBCUTANEOUS at 20:47

## 2025-01-03 RX ADMIN — ONDANSETRON 4 MG: 2 INJECTION, SOLUTION INTRAMUSCULAR; INTRAVENOUS at 02:52

## 2025-01-03 RX ADMIN — MORPHINE SULFATE 2 MG: 2 INJECTION, SOLUTION INTRAMUSCULAR; INTRAVENOUS at 11:32

## 2025-01-03 RX ADMIN — PROMETHAZINE HYDROCHLORIDE 25 MG: 25 TABLET ORAL at 11:52

## 2025-01-03 RX ADMIN — MORPHINE SULFATE 2 MG: 2 INJECTION, SOLUTION INTRAMUSCULAR; INTRAVENOUS at 20:48

## 2025-01-03 RX ADMIN — SODIUM CHLORIDE: 9 INJECTION, SOLUTION INTRAVENOUS at 11:47

## 2025-01-03 RX ADMIN — DOXYCYCLINE 100 MG: 100 INJECTION, POWDER, LYOPHILIZED, FOR SOLUTION INTRAVENOUS at 22:56

## 2025-01-03 RX ADMIN — PANTOPRAZOLE SODIUM 40 MG: 40 INJECTION, POWDER, LYOPHILIZED, FOR SOLUTION INTRAVENOUS at 16:32

## 2025-01-03 RX ADMIN — PANTOPRAZOLE SODIUM 40 MG: 40 INJECTION, POWDER, LYOPHILIZED, FOR SOLUTION INTRAVENOUS at 05:49

## 2025-01-03 RX ADMIN — MORPHINE SULFATE 2 MG: 2 INJECTION, SOLUTION INTRAMUSCULAR; INTRAVENOUS at 07:37

## 2025-01-03 RX ADMIN — PROMETHAZINE HYDROCHLORIDE 25 MG: 25 TABLET ORAL at 18:45

## 2025-01-03 RX ADMIN — DICYCLOMINE HYDROCHLORIDE 10 MG: 10 CAPSULE ORAL at 20:43

## 2025-01-03 ASSESSMENT — PAIN SCALES - GENERAL
PAINLEVEL_OUTOF10: 5
PAINLEVEL_OUTOF10: 7
PAINLEVEL_OUTOF10: 8
PAINLEVEL_OUTOF10: 7
PAINLEVEL_OUTOF10: 3
PAINLEVEL_OUTOF10: 3
PAINLEVEL_OUTOF10: 8
PAINLEVEL_OUTOF10: 4
PAINLEVEL_OUTOF10: 10
PAINLEVEL_OUTOF10: 8
PAINLEVEL_OUTOF10: 4

## 2025-01-03 ASSESSMENT — PAIN DESCRIPTION - LOCATION
LOCATION: ABDOMEN

## 2025-01-03 ASSESSMENT — PAIN DESCRIPTION - DESCRIPTORS
DESCRIPTORS: CRAMPING;SHARP
DESCRIPTORS: BURNING;THROBBING;STABBING
DESCRIPTORS: STABBING;SHARP
DESCRIPTORS: BURNING;SHARP
DESCRIPTORS: BURNING;ACHING
DESCRIPTORS: STABBING;BURNING

## 2025-01-03 ASSESSMENT — PAIN DESCRIPTION - PAIN TYPE: TYPE: CHRONIC PAIN

## 2025-01-03 ASSESSMENT — PAIN DESCRIPTION - ORIENTATION: ORIENTATION: MID

## 2025-01-03 ASSESSMENT — PAIN DESCRIPTION - FREQUENCY: FREQUENCY: CONTINUOUS

## 2025-01-03 ASSESSMENT — PAIN DESCRIPTION - ONSET: ONSET: AWAKENED FROM SLEEP

## 2025-01-03 ASSESSMENT — PAIN - FUNCTIONAL ASSESSMENT: PAIN_FUNCTIONAL_ASSESSMENT: PREVENTS OR INTERFERES SOME ACTIVE ACTIVITIES AND ADLS

## 2025-01-03 NOTE — PROGRESS NOTES
fracture surgery (Left, 10/24/2023); Esophagogastroduodenoscopy (2021); Esophagogastroduodenoscopy (2013); laparoscopy (2013); Ankle surgery (Left, 2019);  section; hip surgery (Left, 2023); hip surgery (Left, 2023); hip surgery (Left, 2023); hip surgery (Left); Ankle surgery (Left, 2023); XR MIDLINE EQUAL OR GREATER THAN 5 YEARS (2013); and Upper gastrointestinal endoscopy (N/A, 2024).    Subjective  Subjective  Subjective: Pt reports that she was admitted w/ C/O nausea and vomiting x 2 weeks.  Pt reports complicated hx with infected left THR and removal of prosthesis.  Pt's recovery limited due to hx of a fall and fracture of left tibia and fibula 2024.  Pt reports no current weight restrictions left LE.  Awaiting lift or insert for left shoe (approximately 5\") to be placed- pt  placed order for shoes and awaiting arrival.   General  Patient assessed for rehabilitation services?: Yes  Additional Pertinent Hx: Per H & P note:The patient is a 44 y.o.  Non- / non  female who presents withHematemesis, Rectal Bleeding, and Wound Check (Very painful (does not think infection is improving))   and she is admitted to the hospital for the management of  Upper GI Bleed and epigastric pain.      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  As per patient, bariatric surgery  on the Sandra Morgan  Current Treatment Recommendations: Strengthening, Balance training, Functional mobility training, Transfer training, Endurance training, Safety education & training, Patient/Caregiver education & training, Positioning, Co-Treatment   Safety Devices  Type of Devices: All fall risk precautions in place, Call light within reach, Gait belt, Bed alarm in place, Patient at risk for falls, Left in bed, Nurse notified (nurse Rocha)       PT Individual Minutes  Time In: 1358  Time Out: 1454  Minutes: 56   Time Code Minutes  Timed Code Treatment Minutes: 36 Minutes       Electronically signed by Brandy Hendrix, PT on 1/3/25 at 3:50 PM EST

## 2025-01-03 NOTE — CARE COORDINATION
Case Management Assessment  Initial Evaluation    Date/Time of Evaluation: 1/3/2025 10:10AM  Assessment Completed by: Helene Crain RN    If patient is discharged prior to next notation, then this note serves as note for discharge by case management.    Patient Name: Krista Chiang                   YOB: 1980  Diagnosis: Abdominal pain [R10.9]  Generalized abdominal pain [R10.84]  Black stool [K92.1]  Nausea and vomiting, unspecified vomiting type [R11.2]                   Date / Time: 1/2/2025 11:24 AM    Patient Admission Status: Inpatient   Readmission Risk (Low < 19, Mod (19-27), High > 27): Readmission Risk Score: 26.1    Current PCP: Arielle Melton APRN - NP  PCP verified by CM? Yes    Chart Reviewed: Yes      History Provided by: Patient  Patient Orientation: Alert and Oriented    Patient Cognition: Alert    Hospitalization in the last 30 days (Readmission):  Yes    If yes, Readmission Assessment in  Navigator will be completed.    Advance Directives:      Code Status: Full Code   Patient's Primary Decision Maker is:      Primary Decision Maker: Naima Grant - Parent - [phone]    Secondary Decision Maker: Zachery Grant - Brother/Sister - [phone]    Discharge Planning:    Patient lives with: Alone Type of Home: Trailer/Mobile Home  Primary Care Giver: Self  Patient Support Systems include: Family Members   Current Financial resources: Medicare  Current community resources: ECF/Home Care  Current services prior to admission: Home Care, Durable Medical Equipment            Current DME: Walker, Wheelchair, Shower Chair, Bedside Commode, Glucometer            Type of Home Care services:  PT, OT, Nursing Services    ADLS  Prior functional level: Assistance with the following:, Mobility, Shopping, Housework  Current functional level: Assistance with the following:, Mobility, Shopping, Housework    PT AM-PAC:   /24  OT AM-PAC:   /24    Family can provide assistance at DC:  Discharge Plan? Yes    Helene Crain RN  Case Management Department  Ph: [phone] Fax: [phone]

## 2025-01-03 NOTE — DISCHARGE INSTR - COC
Continuity of Care Form    Patient Name: Krista Chiang   :  1980  MRN:  [id number]    Admit date:  2025  Discharge date:  25    Code Status Order: Full Code   Advance Directives:   Advance Care Flowsheet Documentation             Admitting Physician:  Crystal Estrada MD  PCP: Arielle Melton, APRN - NP    Discharging Nurse: TRAMAINE Uribe RN  Discharging Hospital Unit/Room#:   Discharging Unit Phone Number: [phone]    Emergency Contact:   Extended Emergency Contact Information  Primary Emergency Contact: Naima Grant  Address: [address] [place] [address]  Home Phone: [phone]  Mobile Phone: [phone]  Relation: Parent  Secondary Emergency Contact: Zachery Grant  Home Phone: [phone]  Mobile Phone: [phone]  Relation: Brother/Sister    Past Surgical History:  Past Surgical History:   Procedure Laterality Date    ABDOMINAL HERNIA REPAIR      incisional hernia repair, complicated by infection, had multiple surgeries for this    ABDOMINAL HERNIA REPAIR  2014    ANKLE FRACTURE SURGERY Left     2023  HIP IRRIGATION AND DEBRIDEMENT AND PLACEMENT OF ANTIBIOTIC IMPREGNATED CEMENT BEADS performed by Dipak Traore DO at [facility] OR    ANKLE FRACTURE SURGERY Left 10/24/2023    IRRIGATION AND DEBRIDEMENT OF LEFT HIP WITH CLOSURE performed by Dipak Traore DO at [facility] OR    ANKLE SURGERY Left 2019    ligament    ANKLE SURGERY Left 2023    IRRIGATION AND DEBRIDEMENT HIP (IRRISEPT, CELLERATE) performed by Dipak Traore DO at [facility] OR    BARIATRIC SURGERY  2013    [facility] : [facility]     SECTION      CHOLECYSTECTOMY      DILATION AND CURETTAGE OF UTERUS  2005    ESOPHAGOGASTRODUODENOSCOPY  2021    ESOPHAGOGASTRODUODENOSCOPY  2013    FINGER AMPUTATION Left 2015    index and ring finger. from [facility] CATH POWER PICC SINGLE  2023    HIP SURGERY Left

## 2025-01-03 NOTE — PLAN OF CARE
Problem: Chronic Conditions and Co-morbidities  Goal: Patient's chronic conditions and co-morbidity symptoms are monitored and maintained or improved  Outcome: Progressing  Flowsheets (Taken 1/3/2025 0323)  Care Plan - Patient's Chronic Conditions and Co-Morbidity Symptoms are Monitored and Maintained or Improved:   Monitor and assess patient's chronic conditions and comorbid symptoms for stability, deterioration, or improvement   Collaborate with multidisciplinary team to address chronic and comorbid conditions and prevent exacerbation or deterioration     Problem: Discharge Planning  Goal: Discharge to home or other facility with appropriate resources  Flowsheets (Taken 1/3/2025 0323)  Discharge to home or other facility with appropriate resources:   Identify barriers to discharge with patient and caregiver   Arrange for needed discharge resources and transportation as appropriate   Identify discharge learning needs (meds, wound care, etc)

## 2025-01-03 NOTE — PLAN OF CARE
Problem: Chronic Conditions and Co-morbidities  Goal: Patient's chronic conditions and co-morbidity symptoms are monitored and maintained or improved  1/3/2025 1346 by Josefina Salvador, RN  Outcome: Progressing    Problem: Discharge Planning  Goal: Discharge to home or other facility with appropriate resources  1/3/2025 1346 by Josefina Salvador, RN  Outcome: Progressing  PT/OT.  following.    Problem: Safety - Adult  Goal: Free from fall injury  Outcome: Progressing   Pt fall risk, fall band present, falling star, safety alarm activated and in use as needed. Hourly rounding performed. Pt encouraged to use call light. See Billy fall risk assessment.     Problem: Pain  Goal: Verbalizes/displays adequate comfort level or baseline comfort level  Outcome: Progressing   Patient medicated for pain as ordered as needed. Education on non pharmacological comfort measures as well. Repositioned for comfort.

## 2025-01-03 NOTE — PROGRESS NOTES
symptoms: pt states she fell pt took c collar off when in ER. Prior to imaging; ORDERING SYSTEM PROVIDED HISTORY: fall, on anticoagulation TECHNOLOGIST PROVIDED HISTORY: fall, on anticoagulation Decision Support Exception - unselect if not a suspected or confirmed emergency medical condition->Emergency Medical Condition (MA) Is the patient pregnant?->No Reason for Exam: fall, on anticoagulation Additional signs and symptoms: pt states she fell denies loc CT HEAD: BRAIN/VENTRICLES: There is no acute intracranial hemorrhage, mass effect or midline shift. No abnormal extra-axial fluid collection. The gray-white differentiation is normal without evidence of an acute infarct. The ventricles are normal in size. ORBITS: Normal SINUSES: Imaged portions of the paranasal sinuses and mastoid air cells are unremarkable. SOFT TISSUES/SKULL: No acute abnormality of the visualized skull or soft tissues. CT CERVICAL SPINE: BONES/ALIGNMENT: There is no acute fracture or traumatic malalignment. DEGENERATIVE CHANGES: No significant degenerative changes. SOFT TISSUES: There is no prevertebral soft tissue swelling.  There is increased nodular soft tissue attenuation at the right and left aspects of the tongue base on series 5, image 32     1.  No acute traumatic injury identified in the head or cervical spine. 2.  Increased soft tissue attenuation at the tongue base in the tongue base region could be related to tongue base lesion or adenopathy.  Correlation with direct visual inspection is requested.         Physical Examination:        Physical Exam  Constitutional:       Appearance: Normal appearance.   HENT:      Head: Normocephalic and atraumatic.      Nose: Nose normal.   Eyes:      Extraocular Movements: Extraocular movements intact.      Pupils: Pupils are equal, round, and reactive to light.   Abdominal:      General: There is distension.      Palpations: There is no mass.      Tenderness: There is abdominal tenderness.         DVT prophylaxis: EPC cuff  Diet: Diabetic diet, n.p.o. from midnight for possible EGD    Plan will be discussed with the attending    Orestes Castillo MD  PGY I Transitional Year Resident  1/3/2025 9:32 AM        Attending Physician Statement  I have discussed the care of Krista Chiang and I have examined the patient myself and taken ROS and HPI, including pertinent history and exam findings, with the resident. I have reviewed the key elements of all parts of the encounter with the resident.  I agree with the assessment, plan and orders as documented by the resident.  Gi consult  Rule out pud  Us liver rule out cbd stone    Electronically signed by Raul Heard MD

## 2025-01-03 NOTE — PROGRESS NOTES
Patient refused a lot of morning meds. Patient c/o nausea but writer didn't want to give zofran because on EKG shows long QT interval. Addressed all the above with Residents team. See orders.

## 2025-01-03 NOTE — PROGRESS NOTES
[facility]  Consult Note       NAME:  Krista Chiang  MEDICAL RECORD NUMBER:  [id number]  AGE: 44 y.o.   GENDER: female  : 1980  TODAY'S DATE:  1/3/2025    Subjective:      Krista Chiang is a 44 y.o. female with inpatient referral to [facility] for:  Left hip wound      Wound Identification:  Wound Type: non-healing surgical  Contributing Factors: edema, diabetes, poor glucose control, decreased mobility, shear force, and obesity    Wound History: Chronic open hip wound.  History of left hip replacement 2023 with subsequent infection and multiple surgeries since that time.  Being seen by [facility] wound care and most recently using Hydrofera Blue for wound care. The patient states the wound is the most healed she has seen it in a while. She does state the periwound area is very tender right now.  Current Wound Care Treatment: Hydrofera Blue dressing removed for eval    Patient Goal of Care:  [x] Wound Healing  [] Odor Control  [] Palliative Care  [] Pain Control   [x] Other: infection prevention        PAST MEDICAL HISTORY        Diagnosis Date    Alcohol abuse     Recurrent episodes of withdrawal    Alcoholic hepatitis without ascites     Antiphospholipid syndrome (HCC)     hypercoagulable state    Anxiety     Arthritis 2013    Painting esophagus 2021    Bipolar 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 2015    Fibromyalgia     Genital herpes, unspecified     GERD (gastroesophageal reflux disease)     H/O bariatric surgery 2013    [facility]    History of pulmonary embolism     Hx of blood clots     clots in both legs and lungs     Hypertension     Lives in nursing home 2023    [facility] [place] [facility] [place] :  # [phone] , fax # [phone]    Lumbosacral spondylosis without myelopathy     [facility]

## 2025-01-03 NOTE — CONSULTS
[facility]    GASTROENTEROLOGY CONSULT    Patient:   Krista Chiang   :    1980   Facility:   [facility]  Date:    1/3/2025  Admission Dx:  Abdominal pain [R10.9]  Generalized abdominal pain [R10.84]  Black stool [K92.1]  Nausea and vomiting, unspecified vomiting type [R11.2]  Requesting physician: Crystal Estrada MD  Reason for consult:   Hematemesis      SUBJECTIVE:  History of Present Illness:  This is a 44 y.o.   female who was admitted 2025 with Abdominal pain [R10.9]  Generalized abdominal pain [R10.84]  Black stool [K92.1]  Nausea and vomiting, unspecified vomiting type [R11.2]. We have been asked to see the patient in consultation by Crystal Estrada MD for  hematemesis. This is a 44 year old female with pmh of previous etoh abuse alcoholic hepatitis emigdio en y by pass hepatic adenoma  left hip hardware removal due to infection ,antiphospholipid syndrome on arixtra DMT2 pernicious anemia bipolar depression pe dvt htn barretts who presented to the ED for nausea with reported \"streaks\" of blood in her vomit, melena and \"streaks of blood \" in her stool. States her symptoms have been occurring for the last 7 days.last dose of arixtra was yesterday, denies nsaid use. C/o generalized abdominal pain. Hgb 12.7. she was seen by our service and underwent egd on  that showed an anastomotic ulcer. No fever ct abd showed no acute findings.  Was also noted to have elevated lft.      Family hx uncle with duodenal cancer no liver pancreatic stomach or colon cancer   Endoscopy no colonoscopy  12/15/24 egd  Retropharyngeal area was grossly normal appearing     Esophagus: normal     Stomach/small bowel:  Emigdio-en-Y type surgery with  2 large ulcers at the anastomosis biopsy gently were taken  The larger ulcer was 3 cm x 1 cm         OBJECTIVE:    PAST MEDICAL/SURGICAL HISTORY  Past Medical History:   Diagnosis Date    Alcohol abuse     Recurrent episodes of withdrawal  DO at [facility] OR    ANKLE FRACTURE SURGERY Left 10/24/2023    IRRIGATION AND DEBRIDEMENT OF LEFT HIP WITH CLOSURE performed by Dipak Traore DO at [facility] OR    ANKLE SURGERY Left 2019    ligament    ANKLE SURGERY Left 2023    IRRIGATION AND DEBRIDEMENT HIP (IRRISEPT, CELLERATE) performed by Dipak Traore DO at [facility] OR    BARIATRIC SURGERY  2013    [facility] : [facility]     SECTION      CHOLECYSTECTOMY      DILATION AND CURETTAGE OF UTERUS      ESOPHAGOGASTRODUODENOSCOPY  2021    ESOPHAGOGASTRODUODENOSCOPY  2013    FINGER AMPUTATION Left 2015    index and ring finger. from [facility] CATH POWER PICC SINGLE  2023    HIP SURGERY Left     2023 HIP HEMIARTHROPLASTY performed by Dipak Traore DO at [facility] OR    HIP SURGERY Left 2023    DEPUY SPACER HIP EXPLANT, REPLACE DEPUY SPACER HIP (SYNTHES DEPUY EXTRACTION SET, LATERAL ON BEAN BAG) performed by Dipak Traore DO at [facility] OR    HIP SURGERY Left 2023    HIP SURGICAL SITE WOUND (DEHISCENCE) IRRIGATION AND DEBRIDEMENT    HIP SURGERY Left 2023    HIP SURGICAL SITE WOUND (DEHISCENCE) IRRIGATION AND DEBRIDEMENT performed by Dipak Traore DO at [facility] OR    HIP SURGERY Left     IRRIGATION AND DEBRIDEMENT HIP (IRRISEPT, CELLERATE) - Left    IVC FILTER INSERTION      LAPAROSCOPY  2013    LEISA    LIVER RESECTION      for hepatic adenoma    LYMPH NODE BIOPSY      JUSTINE AND BSO (CERVIX REMOVED)      TONSILLECTOMY      UPPER GASTROINTESTINAL ENDOSCOPY N/A 2024    ESOPHAGOGASTRODUODENOSCOPY BIOPSY performed by Janet Marques MD at [facility] ENDO    XR MIDLINE EQUAL OR GREATER THAN 5 YEARS  2013    XR MIDLINE EQUAL OR GREATER THAN 5 YEARS       ALLERGIES:  Allergies   Allergen Reactions    Aripiprazole Itching and Swelling     Other reaction(s): Other  Abilify    Aspirin      Patient is on chronic anticoagulation  Patient is on chronic

## 2025-01-04 LAB
ANION GAP SERPL CALCULATED.3IONS-SCNC: 11 MMOL/L (ref 9–16)
ANION GAP SERPL CALCULATED.3IONS-SCNC: 9 MMOL/L (ref 9–16)
BASOPHILS # BLD: 0 K/UL (ref 0–0.2)
BASOPHILS NFR BLD: 1 % (ref 0–2)
BUN SERPL-MCNC: 4 MG/DL (ref 6–20)
BUN SERPL-MCNC: 4 MG/DL (ref 6–20)
CALCIUM SERPL-MCNC: 7.9 MG/DL (ref 8.6–10.4)
CALCIUM SERPL-MCNC: 8.1 MG/DL (ref 8.6–10.4)
CHLORIDE SERPL-SCNC: 112 MMOL/L (ref 98–107)
CHLORIDE SERPL-SCNC: 113 MMOL/L (ref 98–107)
CO2 SERPL-SCNC: 18 MMOL/L (ref 20–31)
CO2 SERPL-SCNC: 19 MMOL/L (ref 20–31)
CREAT SERPL-MCNC: 0.5 MG/DL (ref 0.7–1.2)
CREAT SERPL-MCNC: 0.5 MG/DL (ref 0.7–1.2)
EOSINOPHIL # BLD: 0.2 K/UL (ref 0–0.4)
EOSINOPHILS RELATIVE PERCENT: 3 % (ref 0–4)
ERYTHROCYTE [DISTWIDTH] IN BLOOD BY AUTOMATED COUNT: 19 % (ref 11.5–14.9)
GFR, ESTIMATED: >90 ML/MIN/1.73M2
GFR, ESTIMATED: >90 ML/MIN/1.73M2
GLUCOSE BLD-MCNC: 209 MG/DL (ref 65–105)
GLUCOSE BLD-MCNC: 283 MG/DL (ref 65–105)
GLUCOSE BLD-MCNC: 284 MG/DL (ref 65–105)
GLUCOSE BLD-MCNC: 366 MG/DL (ref 65–105)
GLUCOSE SERPL-MCNC: 293 MG/DL (ref 74–99)
GLUCOSE SERPL-MCNC: 342 MG/DL (ref 74–99)
HCT VFR BLD AUTO: 41.6 % (ref 36–46)
HGB BLD-MCNC: 12.8 G/DL (ref 12–16)
LYMPHOCYTES NFR BLD: 1.9 K/UL (ref 1–4.8)
LYMPHOCYTES RELATIVE PERCENT: 28 % (ref 24–44)
MCH RBC QN AUTO: 24.9 PG (ref 26–34)
MCHC RBC AUTO-ENTMCNC: 30.8 G/DL (ref 31–37)
MCV RBC AUTO: 80.8 FL (ref 80–100)
MONOCYTES NFR BLD: 0.5 K/UL (ref 0.1–1.3)
MONOCYTES NFR BLD: 7 % (ref 1–7)
NEUTROPHILS NFR BLD: 61 % (ref 36–66)
NEUTS SEG NFR BLD: 4.2 K/UL (ref 1.3–9.1)
PLATELET # BLD AUTO: 289 K/UL (ref 150–450)
PMV BLD AUTO: 11 FL (ref 6–12)
POTASSIUM SERPL-SCNC: 3.4 MMOL/L (ref 3.7–5.3)
POTASSIUM SERPL-SCNC: 3.7 MMOL/L (ref 3.7–5.3)
RBC # BLD AUTO: 5.15 M/UL (ref 4–5.2)
SODIUM SERPL-SCNC: 140 MMOL/L (ref 136–145)
SODIUM SERPL-SCNC: 142 MMOL/L (ref 136–145)
WBC OTHER # BLD: 6.9 K/UL (ref 3.5–11)

## 2025-01-04 PROCEDURE — 2580000003 HC RX 258

## 2025-01-04 PROCEDURE — 99233 SBSQ HOSP IP/OBS HIGH 50: CPT

## 2025-01-04 PROCEDURE — 1200000000 HC SEMI PRIVATE

## 2025-01-04 PROCEDURE — 6370000000 HC RX 637 (ALT 250 FOR IP)

## 2025-01-04 PROCEDURE — 2500000003 HC RX 250 WO HCPCS

## 2025-01-04 PROCEDURE — 82947 ASSAY GLUCOSE BLOOD QUANT: CPT

## 2025-01-04 PROCEDURE — 85025 COMPLETE CBC W/AUTO DIFF WBC: CPT

## 2025-01-04 PROCEDURE — 6370000000 HC RX 637 (ALT 250 FOR IP): Performed by: NURSE PRACTITIONER

## 2025-01-04 PROCEDURE — 36415 COLL VENOUS BLD VENIPUNCTURE: CPT

## 2025-01-04 PROCEDURE — 6360000002 HC RX W HCPCS

## 2025-01-04 PROCEDURE — 99231 SBSQ HOSP IP/OBS SF/LOW 25: CPT | Performed by: INTERNAL MEDICINE

## 2025-01-04 PROCEDURE — 2500000003 HC RX 250 WO HCPCS: Performed by: EMERGENCY MEDICINE

## 2025-01-04 PROCEDURE — 80048 BASIC METABOLIC PNL TOTAL CA: CPT

## 2025-01-04 RX ORDER — INSULIN LISPRO 100 [IU]/ML
0-16 INJECTION, SOLUTION INTRAVENOUS; SUBCUTANEOUS
Status: DISCONTINUED | OUTPATIENT
Start: 2025-01-04 | End: 2025-01-09 | Stop reason: HOSPADM

## 2025-01-04 RX ORDER — SUCRALFATE 1 G/1
1 TABLET ORAL 4 TIMES DAILY
Status: DISCONTINUED | OUTPATIENT
Start: 2025-01-04 | End: 2025-01-09 | Stop reason: HOSPADM

## 2025-01-04 RX ORDER — HYDRALAZINE HYDROCHLORIDE 20 MG/ML
5 INJECTION INTRAMUSCULAR; INTRAVENOUS EVERY 6 HOURS PRN
Status: DISCONTINUED | OUTPATIENT
Start: 2025-01-04 | End: 2025-01-09 | Stop reason: HOSPADM

## 2025-01-04 RX ADMIN — INSULIN LISPRO 8 UNITS: 100 INJECTION, SOLUTION INTRAVENOUS; SUBCUTANEOUS at 16:43

## 2025-01-04 RX ADMIN — PROMETHAZINE HYDROCHLORIDE 25 MG: 25 TABLET ORAL at 00:33

## 2025-01-04 RX ADMIN — INSULIN LISPRO 8 UNITS: 100 INJECTION, SOLUTION INTRAVENOUS; SUBCUTANEOUS at 12:18

## 2025-01-04 RX ADMIN — SODIUM CHLORIDE, PRESERVATIVE FREE 10 ML: 5 INJECTION INTRAVENOUS at 16:48

## 2025-01-04 RX ADMIN — DICYCLOMINE HYDROCHLORIDE 10 MG: 10 CAPSULE ORAL at 16:44

## 2025-01-04 RX ADMIN — SUCRALFATE 1 G: 1 TABLET ORAL at 21:11

## 2025-01-04 RX ADMIN — GABAPENTIN 300 MG: 300 CAPSULE ORAL at 21:11

## 2025-01-04 RX ADMIN — DOXYCYCLINE 100 MG: 100 INJECTION, POWDER, LYOPHILIZED, FOR SOLUTION INTRAVENOUS at 12:22

## 2025-01-04 RX ADMIN — DOXYCYCLINE 100 MG: 100 INJECTION, POWDER, LYOPHILIZED, FOR SOLUTION INTRAVENOUS at 23:35

## 2025-01-04 RX ADMIN — PANTOPRAZOLE SODIUM 40 MG: 40 INJECTION, POWDER, LYOPHILIZED, FOR SOLUTION INTRAVENOUS at 16:46

## 2025-01-04 RX ADMIN — DICYCLOMINE HYDROCHLORIDE 10 MG: 10 CAPSULE ORAL at 06:13

## 2025-01-04 RX ADMIN — MORPHINE SULFATE 2 MG: 2 INJECTION, SOLUTION INTRAMUSCULAR; INTRAVENOUS at 18:49

## 2025-01-04 RX ADMIN — PROMETHAZINE HYDROCHLORIDE 25 MG: 25 TABLET ORAL at 12:18

## 2025-01-04 RX ADMIN — GABAPENTIN 300 MG: 300 CAPSULE ORAL at 13:31

## 2025-01-04 RX ADMIN — MORPHINE SULFATE 2 MG: 2 INJECTION, SOLUTION INTRAMUSCULAR; INTRAVENOUS at 23:35

## 2025-01-04 RX ADMIN — SODIUM CHLORIDE: 9 INJECTION, SOLUTION INTRAVENOUS at 18:47

## 2025-01-04 RX ADMIN — Medication 6 MG: at 21:17

## 2025-01-04 RX ADMIN — SODIUM CHLORIDE: 9 INJECTION, SOLUTION INTRAVENOUS at 08:47

## 2025-01-04 RX ADMIN — MORPHINE SULFATE 2 MG: 2 INJECTION, SOLUTION INTRAMUSCULAR; INTRAVENOUS at 13:31

## 2025-01-04 RX ADMIN — PANTOPRAZOLE SODIUM 40 MG: 40 INJECTION, POWDER, LYOPHILIZED, FOR SOLUTION INTRAVENOUS at 04:33

## 2025-01-04 RX ADMIN — SODIUM CHLORIDE, PRESERVATIVE FREE 10 ML: 5 INJECTION INTRAVENOUS at 08:47

## 2025-01-04 RX ADMIN — MORPHINE SULFATE 2 MG: 2 INJECTION, SOLUTION INTRAMUSCULAR; INTRAVENOUS at 08:45

## 2025-01-04 RX ADMIN — PROMETHAZINE HYDROCHLORIDE 25 MG: 25 TABLET ORAL at 21:17

## 2025-01-04 RX ADMIN — DICYCLOMINE HYDROCHLORIDE 10 MG: 10 CAPSULE ORAL at 12:18

## 2025-01-04 RX ADMIN — DICYCLOMINE HYDROCHLORIDE 10 MG: 10 CAPSULE ORAL at 21:10

## 2025-01-04 RX ADMIN — MORPHINE SULFATE 2 MG: 2 INJECTION, SOLUTION INTRAMUSCULAR; INTRAVENOUS at 00:34

## 2025-01-04 RX ADMIN — INSULIN LISPRO 16 UNITS: 100 INJECTION, SOLUTION INTRAVENOUS; SUBCUTANEOUS at 21:11

## 2025-01-04 RX ADMIN — SUCRALFATE 1 G: 1 TABLET ORAL at 13:31

## 2025-01-04 RX ADMIN — MORPHINE SULFATE 2 MG: 2 INJECTION, SOLUTION INTRAMUSCULAR; INTRAVENOUS at 04:36

## 2025-01-04 RX ADMIN — SUCRALFATE 1 G: 1 TABLET ORAL at 16:44

## 2025-01-04 ASSESSMENT — PAIN DESCRIPTION - DESCRIPTORS
DESCRIPTORS: GNAWING;DISCOMFORT
DESCRIPTORS: SHARP;BURNING
DESCRIPTORS: SHARP;BURNING
DESCRIPTORS: BURNING;ACHING;GNAWING
DESCRIPTORS: ACHING
DESCRIPTORS: ACHING;BURNING
DESCRIPTORS: ACHING;BURNING
DESCRIPTORS: ACHING

## 2025-01-04 ASSESSMENT — PAIN SCALES - GENERAL
PAINLEVEL_OUTOF10: 8
PAINLEVEL_OUTOF10: 7
PAINLEVEL_OUTOF10: 7
PAINLEVEL_OUTOF10: 4
PAINLEVEL_OUTOF10: 8
PAINLEVEL_OUTOF10: 7

## 2025-01-04 ASSESSMENT — PAIN DESCRIPTION - ORIENTATION
ORIENTATION: RIGHT;LEFT
ORIENTATION: RIGHT
ORIENTATION: LEFT
ORIENTATION: RIGHT;MID
ORIENTATION: LEFT

## 2025-01-04 ASSESSMENT — PAIN DESCRIPTION - LOCATION
LOCATION: ABDOMEN;HIP
LOCATION: ABDOMEN
LOCATION: ABDOMEN;HIP
LOCATION: ABDOMEN;HIP
LOCATION: ABDOMEN
LOCATION: HIP;GROIN
LOCATION: ABDOMEN;HIP
LOCATION: ABDOMEN;HIP
LOCATION: ABDOMEN

## 2025-01-04 ASSESSMENT — PAIN - FUNCTIONAL ASSESSMENT: PAIN_FUNCTIONAL_ASSESSMENT: PREVENTS OR INTERFERES SOME ACTIVE ACTIVITIES AND ADLS

## 2025-01-04 ASSESSMENT — PAIN SCALES - WONG BAKER: WONGBAKER_NUMERICALRESPONSE: NO HURT

## 2025-01-04 NOTE — CARE COORDINATION
ONGOING DISCHARGE PLAN:    Patient is alert and oriented x4.    Spoke with patient regarding discharge plan and patient confirms that plan is still home with [facility].    IV doxy per ID    GI consult  Recent EGD on 12/18  Oral carafate    Wound care    PT/OT      Will continue to follow for additional discharge needs.    If patient is discharged prior to next notation, then this note serves as note for discharge by case management.    Electronically signed by Lynne Lui RN on 1/4/2025 at 9:06 AM

## 2025-01-04 NOTE — TRANSITION OF CARE
Transition of care    44-year-old female who presented with hematemesis and melena.  Complex medical history with Awa-en-Y in 2013, complicated left hip arthroplasty last year with Klebsiella/VRE/CRE infections, and antiphospholipid syndrome on found to fondaparinux.  She was recently discharged from [facility] 3 weeks ago where she had an EGD that showed 2 large ulcers at the site of anastomosis. Patient came in stating that she has been having persistent nausea, vomiting with streaks of blood in her vomit since the EGD.  Her hemoglobin has been stable, 12.7 on admission, and today is 11.4.  GI has been consulted, unclear when EGD will be done.  ID also consulted for left hip wound, empirically on Doxy as per previous admissions recommendation.  Also had prolonged QTc on most recent EKG, avoiding Zofran for her nausea/vomiting.  Liver ultrasound ordered due to rising LFTs.    Resident team will sign off and private service will take over care.    Electronically signed by aSeid Ann MD on 1/4/2025 at 7:19 AM

## 2025-01-04 NOTE — PLAN OF CARE
Problem: Chronic Conditions and Co-morbidities  Goal: Patient's chronic conditions and co-morbidity symptoms are monitored and maintained or improved  Outcome: Progressing     Problem: Discharge Planning  Goal: Discharge to home or other facility with appropriate resources  Outcome: Progressing     Problem: Skin/Tissue Integrity  Goal: Absence of new skin breakdown  Description: 1.  Monitor for areas of redness and/or skin breakdown  2.  Assess vascular access sites hourly  3.  Every 4-6 hours minimum:  Change oxygen saturation probe site  4.  Every 4-6 hours:  If on nasal continuous positive airway pressure, respiratory therapy assess nares and determine need for appliance change or resting period.  Outcome: Progressing     Problem: Safety - Adult  Goal: Free from fall injury  Outcome: Progressing  Flowsheets (Taken 1/4/2025 1222)  Free From Fall Injury: Instruct family/caregiver on patient safety     Problem: Pain  Goal: Verbalizes/displays adequate comfort level or baseline comfort level  Outcome: Progressing     Problem: ABCDS Injury Assessment  Goal: Absence of physical injury  Outcome: Progressing  Flowsheets (Taken 1/4/2025 1222)  Absence of Physical Injury: Implement safety measures based on patient assessment

## 2025-01-04 NOTE — PROGRESS NOTES
GI Progress notes    1/4/2025   12:01 PM    Name:  Krista Chiang  MRN:    [id number]     Acct:     [id number]   Room:  2045/2045-01   Day: 2     Admit Date: 1/2/2025 11:24 AM  PCP: Arielle Melton APRN - NP    Subjective:     C/C:   Chief Complaint   Patient presents with    Hematemesis    Rectal Bleeding    Wound Check     Very painful (does not think infection is improving)       Interval History: Status: not changed.     Patient seen and examined.  No acute events overnight.  C/o endorse epigastric pain  No vomiting  + nausea  Tolerating clears  Hgb stable      ROS:  Constitutional: negative for chills, fevers and sweats  Respiratory: negative for cough, dyspnea on exertion, hemoptysis, shortness of breath and wheezing  Cardiovascular: negative for chest pain, chest pressure/discomfort, dyspnea, lower extremity edema and palpitations  Gastrointestinal: negative for abdominal pain, constipation, diarrhea, nausea and vomiting  Neurological: negative for dizziness and headaches    Medications:     Allergies:   Allergies   Allergen Reactions    Aripiprazole Itching and Swelling     Other reaction(s): Other  Abilify    Aspirin      Patient is on chronic anticoagulation  Patient is on chronic anticoagulation  Patient is on chronic anticoagulation    Betadine [Povidone Iodine] Rash    Celebrex [Celecoxib] Itching    Celexa [Citalopram Hydrobromide]     Citalopram Itching, Rash and Swelling     Other reaction(s): Unknown    Furosemide      Tolerates Bumex  Other reaction(s): Unknown    Nitrofurantoin      Other reaction(s): Unknown    Tricyclic Antidepressants     Codeine Rash and Hives     Other reaction(s): Unknown    Lithium Nausea And Vomiting     Other reaction(s): Unknown    Paroxetine Rash and Hives     Other reaction(s): Unknown  Paxil    Paxil [Paroxetine Hcl] Rash    Povidone Iodine Rash     Other reaction(s): Unknown    Sertraline Hives and Rash    Tape [Adhesive Tape] Rash     Paper tape     differentiation is normal without evidence of an acute infarct. The ventricles are normal in size. ORBITS: Normal SINUSES: Imaged portions of the paranasal sinuses and mastoid air cells are unremarkable. SOFT TISSUES/SKULL: No acute abnormality of the visualized skull or soft tissues. CT CERVICAL SPINE: BONES/ALIGNMENT: There is no acute fracture or traumatic malalignment. DEGENERATIVE CHANGES: No significant degenerative changes. SOFT TISSUES: There is no prevertebral soft tissue swelling.  There is increased nodular soft tissue attenuation at the right and left aspects of the tongue base on series 5, image 32     1.  No acute traumatic injury identified in the head or cervical spine. 2.  Increased soft tissue attenuation at the tongue base in the tongue base region could be related to tongue base lesion or adenopathy.  Correlation with direct visual inspection is requested.        Physical Examination:        General appearance: alert, cooperative and no distress  Mental Status: oriented to person, place and time and normal affect  Lungs: clear to auscultation bilaterally, normal effort  Heart: regular rate and rhythm, no murmur,  Abdomen: soft, nontender, nondistended, bowel sounds present all four quadrants, no masses, hepatomegaly or splenomegaly  Extremities: no edema, redness or tenderness in the calves  Skin: no gross lesions, rashes, or induration    Assessment:        Primary Problem  Abdominal pain     Active Hospital Problems    Diagnosis Date Noted    Nausea and vomiting [R11.2] 01/03/2025    Abdominal pain [R10.9] 01/02/2025    Black stool [K92.1] 12/18/2024    Open wnd hip/thigh-complicated, left, subsequent encounter [S71.002D, S71.102D] 02/28/2024    Elevated LFTs [R79.89] 04/14/2023    Type 2 diabetes mellitus with diabetic polyneuropathy, with long-term current use of insulin (HCC) [E11.42, Z79.4] 07/04/2015     Past Medical History:   Diagnosis Date    Alcohol abuse     Recurrent episodes of

## 2025-01-04 NOTE — PLAN OF CARE
Problem: Chronic Conditions and Co-morbidities  Goal: Patient's chronic conditions and co-morbidity symptoms are monitored and maintained or improved  1/3/2025 2336 by Bethany Moss, RN  Outcome: Progressing  Flowsheets (Taken 1/3/2025 2044)  Care Plan - Patient's Chronic Conditions and Co-Morbidity Symptoms are Monitored and Maintained or Improved:   Monitor and assess patient's chronic conditions and comorbid symptoms for stability, deterioration, or improvement   Collaborate with multidisciplinary team to address chronic and comorbid conditions and prevent exacerbation or deterioration   Update acute care plan with appropriate goals if chronic or comorbid symptoms are exacerbated and prevent overall improvement and discharge     Problem: Discharge Planning  Goal: Discharge to home or other facility with appropriate resources  1/3/2025 2336 by Bethany Moss, RN  Outcome: Progressing  Flowsheets (Taken 1/3/2025 2044)  Discharge to home or other facility with appropriate resources:   Identify barriers to discharge with patient and caregiver   Arrange for needed discharge resources and transportation as appropriate   Identify discharge learning needs (meds, wound care, etc)   Arrange for interpreters to assist at discharge as needed   Refer to discharge planning if patient needs post-hospital services based on physician order or complex needs related to functional status, cognitive ability or social support system       Problem: Skin/Tissue Integrity  Goal: Absence of new skin breakdown  Description: 1.  Monitor for areas of redness and/or skin breakdown  2.  Assess vascular access sites hourly  3.  Every 4-6 hours minimum:  Change oxygen saturation probe site  4.  Every 4-6 hours:  If on nasal continuous positive airway pressure, respiratory therapy assess nares and determine need for appliance change or resting period.  1/3/2025 2336 by Bethany Moss, RN  Outcome: Progressing      Problem: Pain  Goal:  Verbalizes/displays adequate comfort level or baseline comfort level  1/3/2025 2336 by Bethany Moss, RN  Outcome: Progressing  Flowsheets (Taken 1/3/2025 2043)  Verbalizes/displays adequate comfort level or baseline comfort level:   Encourage patient to monitor pain and request assistance   Assess pain using appropriate pain scale   Administer analgesics based on type and severity of pain and evaluate response   Implement non-pharmacological measures as appropriate and evaluate response

## 2025-01-04 NOTE — CONSULTS
[facility] -   Infectious diseases evaluation  admission date 1/2/2025    reason for consultation:   Left hip wound infection, history of ESBL and VRE    Impression :   Current:  Anemia suspected GI bleed, followed by GI  Left hip wound no signs of wound infection currently/Intramedullary bone marrow edema extending from the proximal into the mid left femoral diaphysis nonspecific seen on MRI 12/16/2024 questionable chronic osteomyelitis  S/P left hip hemiarthroplasty performed by Dr. Traore on 7/19/2023 after sustaining a femoral neck fracture.    S/P left hip I&D /placement of antibiotic cement beads on 8/21/2023 due to infection post hemiarthroplasty.    S/P left hip [facility] at [facility].    DM  Alcohol abuse   Antiphospholipid syndrome   H/O bariatric surgery   H/O PE/DVT status post IVC filter  H/O MRSA/MDRO/VRE/Acinetobacter      HENCE:     IV doxycycline for now due to vomiting, may change to oral and discharge  till further evaluation by [facility] orthopedic surgery.  Pregnancy test monthly while on doxycycline  Follow-up with me in 1-2 weeks .    Infection Control Recommendations   [place] Precautions  Contact Isolation       Antimicrobial Stewardship Recommendations   Simplification of therapy  Targeted therapy      History of Present Illness:   Initial history:  Krista Chiang is a 44 y.o.-year-old female presenting to hospital with severe nausea, vomiting for 3 weeks associated with epigastric pain, rectal bleeding and hematemesis.  She had a chronic left hip wound with no bone exposure, no necrotic tissue or purulent drainage, no surrounding redness.  Left hip MRI 12/16/2024  IMPRESSION:  1. Exam limited by artifact  2. Large lateral left hip wound with linear edema communicating around the  posterior margin of the proximal femur to the left hip joint.  No discrete  drainable abscess or fluid collection.  3. Intramedullary bone marrow edema extending from  --   --  289   LYMPHOPCT 42  --   --  28   MONOPCT 10*  --   --  7   EOSPCT 2  --   --  3    < > = values in this interval not displayed.     BMP:  Recent Labs     01/03/25  0637 01/04/25  0744 01/04/25  1032    142 140   K 3.2* 3.4* 3.7   * 113* 112*   CO2 19* 18* 19*   BUN 5* 4* 4*   CREATININE 0.5* 0.5* 0.5*   MG 1.9  --   --      Hepatic Function Panel:   Recent Labs     01/02/25  1145 01/03/25  0637   BILIDIR 0.2 0.2   IBILI 0.1 0.1   BILITOT 0.3 0.3   ALKPHOS 253* 237*   ALT 21 40*   AST 25 105*     No results for input(s): \"RPR\" in the last 72 hours.  No results for input(s): \"HIV\" in the last 72 hours.  No results for input(s): \"BC\" in the last 72 hours.  Lab Results   Component Value Date/Time    CREATININE 0.5 01/04/2025 10:32 AM    GLUCOSE 342 01/04/2025 10:32 AM    GLUCOSE 403 06/03/2012 04:17 AM       Detailed results:        Thank you for allowing us to participate in the care of this patient.Please call with questions.    This note is created with the assistance of a speech recognition program.  While intending to generate adocument that actually reflects the content of the visit, the document can still have some errors including those of syntax and sound a like substitutions which may escape proof reading.  It such instances, actual meaningcan be extrapolated by contextual diversion.    Jerome Flannery MD  Office: [phone]  Perfect serve / office [phone]

## 2025-01-04 NOTE — PROGRESS NOTES
Writer sent new patient consult to Dr. Flannery for Left hip wound infection, Hx of ESBL, Klabsella, VRE. Message will be delievered when perfect serve message turn back on at 7AM.

## 2025-01-04 NOTE — PROGRESS NOTES
[facility]  IN-PATIENT SERVICE  [facility]  IN-PATIENT SERVICE   [facility]     Progress Note            Date:   1/4/2025  Patient name:  Krista Chiang  Date of admission:  1/2/2025 11:24 AM  MRN:   [id number]  Account:  [id number]  YOB: 1980  PCP:    Arielle Melton APRN - NP  Room:   2045/2045-01  Code Status:    Full Code    Chief Complaint:     Chief Complaint   Patient presents with    Hematemesis    Rectal Bleeding    Wound Check     Very painful (does not think infection is improving)       History Obtained From:     patient    History of Present Illness:     The patient is a 44 y.o.  Non- / non  female who presents withHematemesis, Rectal Bleeding, and Wound Check (Very painful (does not think infection is improving))   and she is admitted to the hospital for the management of  Upper GI Bleed and epigastric pain.     She has a past medical history of morbid obesity s/p Rux en Y surgery in 2013, diabetes mellitus(on insulin), left hip arthroplasty (complicated with infection, hardware removed, got infected, on doxycycline until 1/16/2025 for Klebsiella infection, previous infection of MRSA, and VRE), s/p left fractured tibia and fibula, anxiety and depression(on Wellbutrin and Prozac), SVT(on Cardizem), frostbite (amputation of second and fourth fingers on left hand), alcohol abuse, DVT s/p IVC filter, antiphospholipid syndrome (on fondaparinux), fibromyalgia, hepatic adenoma, hyperlipidemia, hypertension, and skin carcinoma.  As per patient, bariatric surgery was complicated with ventral incisional hernia, required laparoscopic adhesions lysis.     He was discharged from [facility] 3 weeks ago.  At that time endoscopy was done, which showed 2 large ulcer at the anastomosis.  Biopsies were taken.    Today she came in with a complaint of severe nausea, vomiting for the  Infection not excluded. Bone marrow edema is present extending for a depth of 1 cm in the subcortical left acetabulum. Bone marrow signal in the remainder of the pelvis and right proximal femur is maintained. Soft Tissues:  Similar to the recent CT, there is a large lateral left hip wound with linear edema tracking along the wound.  This communicates to the left hip joint, wrapping around the posterior margin of the remnant proximal femur.  No discrete drainable abscess is present. Limited evaluation of the intrapelvic contents demonstrates no acute abnormality. Joints:  Edema and fluid in left hip joint communicating to the wound and soft tissues.  No right hip joint effusion.  Symphysis pubis is maintained. Sacroiliac joints are maintained.     1. Exam limited by artifact 2. Large lateral left hip wound with linear edema communicating around the posterior margin of the proximal femur to the left hip joint.  No discrete drainable abscess or fluid collection. 3. Intramedullary bone marrow edema extending from the proximal into the mid left femoral diaphysis is nonspecific.  Infection not excluded.     Echo (TTE) complete (PRN contrast/bubble/strain/3D)    Result Date: 12/16/2024  The left ventricle appears normal in size, mild increased LV wall thickness, normal LV wall motions Normal LV systolic function, ejection fraction 55 to 60% The right ventricle appears normal in size and function The left and the right atrium appears normal in size intact intra-atrial septum The aortic valve appears trileaflet, no stenosis no regurgitation Mitral valve structure appears normal trace regurgitation no stenosis Trace tricuspid regurgitation no stenosis normal right ventricular systolic pressure 28 mmHg Normal aortic root dimensions The IVC appears normal in size normal respiratory variation No significant pericardial effusion seen No obvious intracardiac mass or shunt noted     CT HIP LEFT W CONTRAST    Result Date:

## 2025-01-05 LAB
GLUCOSE BLD-MCNC: 172 MG/DL (ref 65–105)
GLUCOSE BLD-MCNC: 177 MG/DL (ref 65–105)
GLUCOSE BLD-MCNC: 242 MG/DL (ref 65–105)
GLUCOSE BLD-MCNC: 253 MG/DL (ref 65–105)
LKM AB TITR SER IF: NORMAL {TITER}
SMOOTH MUSCLE ANTIBODY: 12 UNITS (ref 0–19)

## 2025-01-05 PROCEDURE — 6370000000 HC RX 637 (ALT 250 FOR IP)

## 2025-01-05 PROCEDURE — 6360000002 HC RX W HCPCS

## 2025-01-05 PROCEDURE — 82947 ASSAY GLUCOSE BLOOD QUANT: CPT

## 2025-01-05 PROCEDURE — APPSS30 APP SPLIT SHARED TIME 16-30 MINUTES: Performed by: NURSE PRACTITIONER

## 2025-01-05 PROCEDURE — 2500000003 HC RX 250 WO HCPCS: Performed by: EMERGENCY MEDICINE

## 2025-01-05 PROCEDURE — 2580000003 HC RX 258

## 2025-01-05 PROCEDURE — 99232 SBSQ HOSP IP/OBS MODERATE 35: CPT

## 2025-01-05 PROCEDURE — 2500000003 HC RX 250 WO HCPCS

## 2025-01-05 PROCEDURE — 1200000000 HC SEMI PRIVATE

## 2025-01-05 PROCEDURE — 6370000000 HC RX 637 (ALT 250 FOR IP): Performed by: NURSE PRACTITIONER

## 2025-01-05 PROCEDURE — 99231 SBSQ HOSP IP/OBS SF/LOW 25: CPT | Performed by: INTERNAL MEDICINE

## 2025-01-05 RX ORDER — POLYETHYLENE GLYCOL 3350 17 G/17G
17 POWDER, FOR SOLUTION ORAL DAILY
Status: DISCONTINUED | OUTPATIENT
Start: 2025-01-05 | End: 2025-01-09 | Stop reason: HOSPADM

## 2025-01-05 RX ADMIN — MORPHINE SULFATE 2 MG: 2 INJECTION, SOLUTION INTRAMUSCULAR; INTRAVENOUS at 19:14

## 2025-01-05 RX ADMIN — SODIUM CHLORIDE, PRESERVATIVE FREE 10 ML: 5 INJECTION INTRAVENOUS at 17:26

## 2025-01-05 RX ADMIN — DOXYCYCLINE 100 MG: 100 INJECTION, POWDER, LYOPHILIZED, FOR SOLUTION INTRAVENOUS at 23:45

## 2025-01-05 RX ADMIN — BUPROPION HYDROCHLORIDE 150 MG: 150 TABLET, EXTENDED RELEASE ORAL at 08:48

## 2025-01-05 RX ADMIN — MORPHINE SULFATE 2 MG: 2 INJECTION, SOLUTION INTRAMUSCULAR; INTRAVENOUS at 05:30

## 2025-01-05 RX ADMIN — MORPHINE SULFATE 2 MG: 2 INJECTION, SOLUTION INTRAMUSCULAR; INTRAVENOUS at 14:30

## 2025-01-05 RX ADMIN — SODIUM CHLORIDE: 9 INJECTION, SOLUTION INTRAVENOUS at 07:31

## 2025-01-05 RX ADMIN — INSULIN LISPRO 8 UNITS: 100 INJECTION, SOLUTION INTRAVENOUS; SUBCUTANEOUS at 17:19

## 2025-01-05 RX ADMIN — SUCRALFATE 1 G: 1 TABLET ORAL at 11:42

## 2025-01-05 RX ADMIN — SUCRALFATE 1 G: 1 TABLET ORAL at 17:20

## 2025-01-05 RX ADMIN — FLUOXETINE HYDROCHLORIDE 40 MG: 20 CAPSULE ORAL at 08:47

## 2025-01-05 RX ADMIN — INSULIN LISPRO 4 UNITS: 100 INJECTION, SOLUTION INTRAVENOUS; SUBCUTANEOUS at 11:15

## 2025-01-05 RX ADMIN — MORPHINE SULFATE 2 MG: 2 INJECTION, SOLUTION INTRAMUSCULAR; INTRAVENOUS at 10:02

## 2025-01-05 RX ADMIN — SUCRALFATE 1 G: 1 TABLET ORAL at 08:47

## 2025-01-05 RX ADMIN — GABAPENTIN 300 MG: 300 CAPSULE ORAL at 21:47

## 2025-01-05 RX ADMIN — SODIUM CHLORIDE: 9 INJECTION, SOLUTION INTRAVENOUS at 17:28

## 2025-01-05 RX ADMIN — PANTOPRAZOLE SODIUM 40 MG: 40 INJECTION, POWDER, LYOPHILIZED, FOR SOLUTION INTRAVENOUS at 05:32

## 2025-01-05 RX ADMIN — MORPHINE SULFATE 2 MG: 2 INJECTION, SOLUTION INTRAMUSCULAR; INTRAVENOUS at 23:35

## 2025-01-05 RX ADMIN — DICYCLOMINE HYDROCHLORIDE 10 MG: 10 CAPSULE ORAL at 08:48

## 2025-01-05 RX ADMIN — SUCRALFATE 1 G: 1 TABLET ORAL at 21:47

## 2025-01-05 RX ADMIN — DICYCLOMINE HYDROCHLORIDE 10 MG: 10 CAPSULE ORAL at 21:47

## 2025-01-05 RX ADMIN — DICYCLOMINE HYDROCHLORIDE 10 MG: 10 CAPSULE ORAL at 11:06

## 2025-01-05 RX ADMIN — PANTOPRAZOLE SODIUM 40 MG: 40 INJECTION, POWDER, LYOPHILIZED, FOR SOLUTION INTRAVENOUS at 17:24

## 2025-01-05 RX ADMIN — GABAPENTIN 300 MG: 300 CAPSULE ORAL at 14:30

## 2025-01-05 RX ADMIN — GABAPENTIN 300 MG: 300 CAPSULE ORAL at 08:47

## 2025-01-05 RX ADMIN — POLYETHYLENE GLYCOL 3350 17 G: 17 POWDER, FOR SOLUTION ORAL at 13:36

## 2025-01-05 RX ADMIN — DOXYCYCLINE 100 MG: 100 INJECTION, POWDER, LYOPHILIZED, FOR SOLUTION INTRAVENOUS at 11:07

## 2025-01-05 RX ADMIN — SODIUM CHLORIDE, PRESERVATIVE FREE 10 ML: 5 INJECTION INTRAVENOUS at 10:03

## 2025-01-05 RX ADMIN — DILTIAZEM HYDROCHLORIDE 120 MG: 120 CAPSULE, COATED, EXTENDED RELEASE ORAL at 08:47

## 2025-01-05 RX ADMIN — DICYCLOMINE HYDROCHLORIDE 10 MG: 10 CAPSULE ORAL at 17:20

## 2025-01-05 ASSESSMENT — PAIN DESCRIPTION - DESCRIPTORS
DESCRIPTORS: BURNING;ACHING
DESCRIPTORS: ACHING;BURNING
DESCRIPTORS: BURNING;SHARP
DESCRIPTORS: SHARP;BURNING
DESCRIPTORS: SHARP;BURNING

## 2025-01-05 ASSESSMENT — PAIN SCALES - GENERAL
PAINLEVEL_OUTOF10: 0
PAINLEVEL_OUTOF10: 4
PAINLEVEL_OUTOF10: 8
PAINLEVEL_OUTOF10: 8
PAINLEVEL_OUTOF10: 2
PAINLEVEL_OUTOF10: 8
PAINLEVEL_OUTOF10: 7
PAINLEVEL_OUTOF10: 7
PAINLEVEL_OUTOF10: 5
PAINLEVEL_OUTOF10: 8
PAINLEVEL_OUTOF10: 4
PAINLEVEL_OUTOF10: 8
PAINLEVEL_OUTOF10: 8

## 2025-01-05 ASSESSMENT — PAIN DESCRIPTION - LOCATION
LOCATION: ABDOMEN
LOCATION: ABDOMEN;HIP
LOCATION: ABDOMEN
LOCATION: BACK;HIP
LOCATION: ABDOMEN;HIP
LOCATION: HIP;GROIN;ABDOMEN

## 2025-01-05 ASSESSMENT — PAIN DESCRIPTION - ORIENTATION
ORIENTATION: RIGHT;LEFT
ORIENTATION: LEFT;MID;RIGHT
ORIENTATION: RIGHT
ORIENTATION: RIGHT
ORIENTATION: RIGHT;LEFT
ORIENTATION: LEFT;MID

## 2025-01-05 ASSESSMENT — PAIN - FUNCTIONAL ASSESSMENT
PAIN_FUNCTIONAL_ASSESSMENT: PREVENTS OR INTERFERES SOME ACTIVE ACTIVITIES AND ADLS

## 2025-01-05 NOTE — CARE COORDINATION
ONGOING DISCHARGE PLAN:    Patient is alert and oriented x4.    Spoke with patient regarding discharge plan and patient confirms that plan is still home with [facility].    IV doxy per ID, oral at discharge    GI consult  Recent EGD on 12/18  Oral carafate    Wound care    PT/OT      Will continue to follow for additional discharge needs.    If patient is discharged prior to next notation, then this note serves as note for discharge by case management.    Electronically signed by Lynne Lui RN on 1/5/2025 at 10:33 AM

## 2025-01-05 NOTE — PROGRESS NOTES
Patient has scab right mid abdomen, patient had complained of scab loosening and she was tempted to pick at it, small foam dressing applied.      01/05/25 @ 0500 - abdomen reassessed and patient stated, 'glad to have the dressing...keeps me from picking at it'.  Dressing was still clean, dry, and intact.

## 2025-01-05 NOTE — PROGRESS NOTES
GI Progress notes    1/5/2025   10:44 AM    Name:  Krista Chiang  MRN:    [id number]     Acct:     [id number]   Room:  2045/2045-01   Day: 3     Admit Date: 1/2/2025 11:24 AM  PCP: Arielle Melton APRN - NP    Subjective:     C/C:   Chief Complaint   Patient presents with    Hematemesis    Rectal Bleeding    Wound Check     Very painful (does not think infection is improving)       Interval History: Status: not changed.     Patient seen and examined.  No acute events overnight.  Continues to have epigastric discomfort, nausea  No vomiting  Hgb stable  Repeat LFTs pending.  So far liver workup unremarkable.  US showed fatty infiltration.    ROS:  Constitutional: negative for chills, fevers and sweats  Respiratory: negative for cough, dyspnea on exertion, hemoptysis, shortness of breath and wheezing  Cardiovascular: negative for chest pain, chest pressure/discomfort, dyspnea, lower extremity edema and palpitations  Gastrointestinal: negative for abdominal pain, constipation, diarrhea, nausea and vomiting  Neurological: negative for dizziness and headaches    Medications:     Allergies:   Allergies   Allergen Reactions    Aripiprazole Itching and Swelling     Other reaction(s): Other  Abilify    Aspirin      Patient is on chronic anticoagulation  Patient is on chronic anticoagulation  Patient is on chronic anticoagulation    Betadine [Povidone Iodine] Rash    Celebrex [Celecoxib] Itching    Celexa [Citalopram Hydrobromide]     Citalopram Itching, Rash and Swelling     Other reaction(s): Unknown    Furosemide      Tolerates Bumex  Other reaction(s): Unknown    Nitrofurantoin      Other reaction(s): Unknown    Tricyclic Antidepressants     Codeine Rash and Hives     Other reaction(s): Unknown    Lithium Nausea And Vomiting     Other reaction(s): Unknown    Paroxetine Rash and Hives     Other reaction(s): Unknown  Paxil    Paxil [Paroxetine Hcl] Rash    Povidone Iodine Rash     Other reaction(s): Unknown

## 2025-01-05 NOTE — PROGRESS NOTES
[facility]  IN-PATIENT SERVICE  [facility]  IN-PATIENT SERVICE   [facility]     Progress Note            Date:   1/5/2025  Patient name:  Krista Chiang  Date of admission:  1/2/2025 11:24 AM  MRN:   [id number]  Account:  [id number]  YOB: 1980  PCP:    Arielle Melton APRN - NP  Room:   2045/2045-01  Code Status:    Full Code    Chief Complaint:     Chief Complaint   Patient presents with    Hematemesis    Rectal Bleeding    Wound Check     Very painful (does not think infection is improving)       History Obtained From:     patient    History of Present Illness:     The patient is a 44 y.o.  Non- / non  female who presents withHematemesis, Rectal Bleeding, and Wound Check (Very painful (does not think infection is improving))   and she is admitted to the hospital for the management of  Upper GI Bleed and epigastric pain.     She has a past medical history of morbid obesity s/p Rux en Y surgery in 2013, diabetes mellitus(on insulin), left hip arthroplasty (complicated with infection, hardware removed, got infected, on doxycycline until 1/16/2025 for Klebsiella infection, previous infection of MRSA, and VRE), s/p left fractured tibia and fibula, anxiety and depression(on Wellbutrin and Prozac), SVT(on Cardizem), frostbite (amputation of second and fourth fingers on left hand), alcohol abuse, DVT s/p IVC filter, antiphospholipid syndrome (on fondaparinux), fibromyalgia, hepatic adenoma, hyperlipidemia, hypertension, and skin carcinoma.  As per patient, bariatric surgery was complicated with ventral incisional hernia, required laparoscopic adhesions lysis.     He was discharged from [facility] 3 weeks ago.  At that time endoscopy was done, which showed 2 large ulcer at the anastomosis.  Biopsies were taken.    Today she came in with a complaint of severe nausea, vomiting for the

## 2025-01-05 NOTE — DISCHARGE SUMMARY
[facility] Internal Medicine    Raul Heard MD; Ezio Conway MD, Gigi Morales MD, Crystal Estrada MD, Julita Hutchison MD; Wilner Ash MD      [facility] Internal Medicine  IN-PATIENT SERVICE   [facility]    Discharge Summary     Patient ID: Krista Chiang  :  1980   MRN: [id number]     ACCOUNT:  [id number]   Patient's PCP: Arielle Melton APRN - NP  Admit Date: 12/15/2024   Discharge Date: 24    Length of Stay: 4  Code Status:  Full Code  Admitting Physician: Wilner Ash MD  Discharge Physician: Wilner Ash MD     Active Discharge Diagnoses:     Hospital Problem Lists:  Principal Problem:    Postoperative wound infection of left hip  Active Problems:    Alcohol dependence in remission (HCC)    Primary hypercoagulable state (HCC)    Type 2 diabetes mellitus with diabetic polyneuropathy, with long-term current use of insulin (HCC)    Hypertriglyceridemia    Essential hypertension    Antiphospholipid syndrome (HCC)    Epigastric pain    History of Awa-en-Y gastric bypass    History of drug abuse (HCC)    Bipolar depression (HCC)    Black stool  Resolved Problems:    * No resolved hospital problems. *      Admission Condition:  Serious      Discharged Condition: Stable     Hospital Stay:     HPI  Krista Chiang is a 44 y.o. Non- / non  female with history of alcohol abuse, Painting's esophagus, history of bariatric surgery, pulmonary embolism anticoagulated with fondaparinux, blood clots, hypertension, MRSA, pernicious anemia, SVT, and type 2 diabetes mellitus who presents with Eye Problem, Head Injury, and Fall (States vision went dark and lost balance and fell and hit her head. States she is on blood thinners, and currently being treated staph and strep in left hip wound. Left hip repaired in 2023)   and is admitted to the hospital for the management of Left hip postoperative wound infection.

## 2025-01-05 NOTE — PLAN OF CARE
Problem: Chronic Conditions and Co-morbidities  Goal: Patient's chronic conditions and co-morbidity symptoms are monitored and maintained or improved  1/5/2025 1652 by Jesus Gates RN  Outcome: Progressing     Problem: Discharge Planning  Goal: Discharge to home or other facility with appropriate resources  1/5/2025 1652 by Jesus Gates RN  Outcome: Progressing     Problem: Skin/Tissue Integrity  Goal: Absence of new skin breakdown  Description: 1.  Monitor for areas of redness and/or skin breakdown  2.  Assess vascular access sites hourly  3.  Every 4-6 hours minimum:  Change oxygen saturation probe site  4.  Every 4-6 hours:  If on nasal continuous positive airway pressure, respiratory therapy assess nares and determine need for appliance change or resting period.  1/5/2025 1652 by Jesus Gates RN  Outcome: Progressing     Problem: Safety - Adult  Goal: Free from fall injury  1/5/2025 1652 by Jesus Gates RN  Outcome: Progressing  Flowsheets (Taken 1/5/2025 1237)  Free From Fall Injury: Instruct family/caregiver on patient safety     Problem: Pain  Goal: Verbalizes/displays adequate comfort level or baseline comfort level  1/5/2025 1652 by Jesus Gates RN  Outcome: Progressing     Problem: ABCDS Injury Assessment  Goal: Absence of physical injury  1/5/2025 1652 by Jesus Gates RN  Outcome: Progressing  Flowsheets (Taken 1/5/2025 1237)  Absence of Physical Injury: Implement safety measures based on patient assessment

## 2025-01-05 NOTE — PLAN OF CARE
Problem: Chronic Conditions and Co-morbidities  Goal: Patient's chronic conditions and co-morbidity symptoms are monitored and maintained or improved  Outcome: Progressing   Problem: Skin/Tissue Integrity  Goal: Absence of new skin breakdown  Description: 1.  Monitor for areas of redness and/or skin breakdown  2.  Assess vascular access sites hourly  3.  Every 4-6 hours minimum:  Change oxygen saturation probe site  4.  Every 4-6 hours:  If on nasal continuous positive airway pressure, respiratory therapy assess nares and determine need for appliance change or resting period.  Outcome: Progressing   No new skin breakdown noted, no new signs/symptoms of infection, continue to monitor labwork including WBC, medications administered per physician orders  Problem: Safety - Adult  Goal: Free from fall injury  Outcome: Progressing   No falls/injuries this shift, bed in lowest position, brakes on, bed alarm on, call light in reach, side rails up x2  Problem: Pain  Goal: Verbalizes/displays adequate comfort level or baseline comfort level  Outcome: Progressing   No new signs/symptoms of pain noted, pain rating < 3 on scale 0-10, pain controlled with medication/repositioning

## 2025-01-05 NOTE — PROGRESS NOTES
[facility] -   Infectious diseases evaluation  admission date 1/2/2025    reason for consultation:   Left hip wound infection, history of ESBL and VRE    Impression :   Current:  Anemia suspected GI bleed, followed by GI  Left hip wound no signs of wound infection currently/Intramedullary bone marrow edema extending from the proximal into the mid left femoral diaphysis nonspecific seen on MRI 12/16/2024 questionable chronic osteomyelitis  S/P left hip hemiarthroplasty performed by Dr. Traore on 7/19/2023 after sustaining a femoral neck fracture.    S/P left hip I&D /placement of antibiotic cement beads on 8/21/2023 due to infection post hemiarthroplasty.    S/P left hip [facility] at [facility].    DM  Alcohol abuse   Antiphospholipid syndrome   H/O bariatric surgery   H/O PE/DVT status post IVC filter  H/O MRSA/MDRO/VRE/Acinetobacter      HENCE:     IV doxycycline for now due to vomiting, may change to oral on discharge    Follow-up with me in 1-2 weeks .    Infection Control Recommendations   [place] Precautions  Contact Isolation       Antimicrobial Stewardship Recommendations   Simplification of therapy  Targeted therapy      History of Present Illness:   Initial history:  Krista Chiang is a 44 y.o.-year-old female presenting to hospital with severe nausea, vomiting for 3 weeks associated with epigastric pain, rectal bleeding and hematemesis.  She had a chronic left hip wound with no bone exposure, no necrotic tissue or purulent drainage, no surrounding redness.  Left hip MRI 12/16/2024  IMPRESSION:  1. Exam limited by artifact  2. Large lateral left hip wound with linear edema communicating around the  posterior margin of the proximal femur to the left hip joint.  No discrete  drainable abscess or fluid collection.  3. Intramedullary bone marrow edema extending from the proximal into the mid  left femoral diaphysis is nonspecific.  Infection not excluded.

## 2025-01-06 ENCOUNTER — ANESTHESIA (OUTPATIENT)
Dept: ENDOSCOPY | Age: 45
DRG: 378 | End: 2025-01-06
Payer: COMMERCIAL

## 2025-01-06 ENCOUNTER — ANESTHESIA EVENT (OUTPATIENT)
Dept: ENDOSCOPY | Age: 45
DRG: 378 | End: 2025-01-06
Payer: COMMERCIAL

## 2025-01-06 LAB
ALBUMIN SERPL-MCNC: 2.1 G/DL (ref 3.5–5.2)
ALP SERPL-CCNC: 199 U/L (ref 35–104)
ALT SERPL-CCNC: 38 U/L (ref 10–35)
ANA SER QL IA: NEGATIVE
ANION GAP SERPL CALCULATED.3IONS-SCNC: 10 MMOL/L (ref 9–16)
AST SERPL-CCNC: 61 U/L (ref 10–35)
BASOPHILS # BLD: 0.05 K/UL (ref 0–0.2)
BASOPHILS NFR BLD: 1 % (ref 0–2)
BILIRUB DIRECT SERPL-MCNC: <0.1 MG/DL (ref 0–0.3)
BILIRUB INDIRECT SERPL-MCNC: ABNORMAL MG/DL (ref 0–1)
BILIRUB SERPL-MCNC: <0.2 MG/DL (ref 0–1.2)
BUN SERPL-MCNC: 7 MG/DL (ref 6–20)
CALCIUM SERPL-MCNC: 7.8 MG/DL (ref 8.6–10.4)
CHLORIDE SERPL-SCNC: 111 MMOL/L (ref 98–107)
CO2 SERPL-SCNC: 20 MMOL/L (ref 20–31)
CREAT SERPL-MCNC: 0.5 MG/DL (ref 0.7–1.2)
DSDNA IGG SER QL IA: 0.8 IU/ML
EBV EA-D IGG SER-ACNC: 359 U/ML
EBV INTERPRETATION: ABNORMAL
EBV NA IGG SER IA-ACNC: 539 U/ML
EBV VCA IGG SER-ACNC: 516 U/ML
EBV VCA IGM SER-ACNC: 54 U/ML
EOSINOPHIL # BLD: 0.3 K/UL (ref 0–0.4)
EOSINOPHILS RELATIVE PERCENT: 6 % (ref 0–4)
ERYTHROCYTE [DISTWIDTH] IN BLOOD BY AUTOMATED COUNT: 18.6 % (ref 11.5–14.9)
GFR, ESTIMATED: >90 ML/MIN/1.73M2
GLUCOSE BLD-MCNC: 121 MG/DL (ref 65–105)
GLUCOSE BLD-MCNC: 216 MG/DL (ref 65–105)
GLUCOSE BLD-MCNC: 218 MG/DL (ref 65–105)
GLUCOSE BLD-MCNC: 243 MG/DL (ref 65–105)
GLUCOSE BLD-MCNC: 243 MG/DL (ref 65–105)
GLUCOSE BLD-MCNC: 299 MG/DL (ref 65–105)
GLUCOSE SERPL-MCNC: 286 MG/DL (ref 74–99)
HCT VFR BLD AUTO: 37.7 % (ref 36–46)
HGB BLD-MCNC: 11.9 G/DL (ref 12–16)
LYMPHOCYTES NFR BLD: 1.8 K/UL (ref 1–4.8)
LYMPHOCYTES RELATIVE PERCENT: 36 % (ref 24–44)
MCH RBC QN AUTO: 25.7 PG (ref 26–34)
MCHC RBC AUTO-ENTMCNC: 31.6 G/DL (ref 31–37)
MCV RBC AUTO: 81.1 FL (ref 80–100)
MITOCHONDRIA M2 IGG SER-ACNC: 1.4 U/ML (ref 0–4)
MONOCYTES NFR BLD: 0.45 K/UL (ref 0.1–1.3)
MONOCYTES NFR BLD: 9 % (ref 1–7)
MORPHOLOGY: ABNORMAL
NEUTROPHILS NFR BLD: 48 % (ref 36–66)
NEUTS SEG NFR BLD: 2.4 K/UL (ref 1.3–9.1)
NUCLEAR IGG SER IA-RTO: 0.3 U/ML
PLATELET # BLD AUTO: 160 K/UL (ref 150–450)
PMV BLD AUTO: 10.4 FL (ref 6–12)
POTASSIUM SERPL-SCNC: 3.8 MMOL/L (ref 3.7–5.3)
PROT SERPL-MCNC: 5.1 G/DL (ref 6.6–8.7)
RBC # BLD AUTO: 4.65 M/UL (ref 4–5.2)
SODIUM SERPL-SCNC: 141 MMOL/L (ref 136–145)
WBC OTHER # BLD: 5 K/UL (ref 3.5–11)

## 2025-01-06 PROCEDURE — 6370000000 HC RX 637 (ALT 250 FOR IP)

## 2025-01-06 PROCEDURE — 6360000002 HC RX W HCPCS: Performed by: ANESTHESIOLOGY

## 2025-01-06 PROCEDURE — 7100000001 HC PACU RECOVERY - ADDTL 15 MIN: Performed by: INTERNAL MEDICINE

## 2025-01-06 PROCEDURE — 99233 SBSQ HOSP IP/OBS HIGH 50: CPT | Performed by: INTERNAL MEDICINE

## 2025-01-06 PROCEDURE — 6360000002 HC RX W HCPCS

## 2025-01-06 PROCEDURE — 0DB48ZX EXCISION OF ESOPHAGOGASTRIC JUNCTION, VIA NATURAL OR ARTIFICIAL OPENING ENDOSCOPIC, DIAGNOSTIC: ICD-10-PCS | Performed by: INTERNAL MEDICINE

## 2025-01-06 PROCEDURE — 43239 EGD BIOPSY SINGLE/MULTIPLE: CPT | Performed by: INTERNAL MEDICINE

## 2025-01-06 PROCEDURE — 6370000000 HC RX 637 (ALT 250 FOR IP): Performed by: INTERNAL MEDICINE

## 2025-01-06 PROCEDURE — 1200000000 HC SEMI PRIVATE

## 2025-01-06 PROCEDURE — 6360000002 HC RX W HCPCS: Performed by: NURSE ANESTHETIST, CERTIFIED REGISTERED

## 2025-01-06 PROCEDURE — 80048 BASIC METABOLIC PNL TOTAL CA: CPT

## 2025-01-06 PROCEDURE — 88342 IMHCHEM/IMCYTCHM 1ST ANTB: CPT

## 2025-01-06 PROCEDURE — 3609012400 HC EGD TRANSORAL BIOPSY SINGLE/MULTIPLE: Performed by: INTERNAL MEDICINE

## 2025-01-06 PROCEDURE — 6360000002 HC RX W HCPCS: Performed by: INTERNAL MEDICINE

## 2025-01-06 PROCEDURE — 36415 COLL VENOUS BLD VENIPUNCTURE: CPT

## 2025-01-06 PROCEDURE — 85025 COMPLETE CBC W/AUTO DIFF WBC: CPT

## 2025-01-06 PROCEDURE — 2500000003 HC RX 250 WO HCPCS: Performed by: INTERNAL MEDICINE

## 2025-01-06 PROCEDURE — 7100000000 HC PACU RECOVERY - FIRST 15 MIN: Performed by: INTERNAL MEDICINE

## 2025-01-06 PROCEDURE — 2500000003 HC RX 250 WO HCPCS: Performed by: ANESTHESIOLOGY

## 2025-01-06 PROCEDURE — 2580000003 HC RX 258

## 2025-01-06 PROCEDURE — 2500000003 HC RX 250 WO HCPCS

## 2025-01-06 PROCEDURE — 88305 TISSUE EXAM BY PATHOLOGIST: CPT

## 2025-01-06 PROCEDURE — 88312 SPECIAL STAINS GROUP 1: CPT

## 2025-01-06 PROCEDURE — 3700000000 HC ANESTHESIA ATTENDED CARE: Performed by: INTERNAL MEDICINE

## 2025-01-06 PROCEDURE — 82947 ASSAY GLUCOSE BLOOD QUANT: CPT

## 2025-01-06 PROCEDURE — 97166 OT EVAL MOD COMPLEX 45 MIN: CPT

## 2025-01-06 PROCEDURE — 97535 SELF CARE MNGMENT TRAINING: CPT

## 2025-01-06 PROCEDURE — 2709999900 HC NON-CHARGEABLE SUPPLY: Performed by: INTERNAL MEDICINE

## 2025-01-06 PROCEDURE — 80076 HEPATIC FUNCTION PANEL: CPT

## 2025-01-06 PROCEDURE — 2580000003 HC RX 258: Performed by: ANESTHESIOLOGY

## 2025-01-06 PROCEDURE — 2580000003 HC RX 258: Performed by: INTERNAL MEDICINE

## 2025-01-06 PROCEDURE — 2500000003 HC RX 250 WO HCPCS: Performed by: NURSE ANESTHETIST, CERTIFIED REGISTERED

## 2025-01-06 PROCEDURE — 0DB68ZX EXCISION OF STOMACH, VIA NATURAL OR ARTIFICIAL OPENING ENDOSCOPIC, DIAGNOSTIC: ICD-10-PCS | Performed by: INTERNAL MEDICINE

## 2025-01-06 RX ORDER — LIDOCAINE HYDROCHLORIDE 20 MG/ML
INJECTION, SOLUTION EPIDURAL; INFILTRATION; INTRACAUDAL; PERINEURAL
Status: DISCONTINUED | OUTPATIENT
Start: 2025-01-06 | End: 2025-01-06 | Stop reason: SDUPTHER

## 2025-01-06 RX ORDER — PROPOFOL 10 MG/ML
INJECTION, EMULSION INTRAVENOUS
Status: DISCONTINUED | OUTPATIENT
Start: 2025-01-06 | End: 2025-01-06 | Stop reason: SDUPTHER

## 2025-01-06 RX ORDER — HYDRALAZINE HYDROCHLORIDE 25 MG/1
25 TABLET, FILM COATED ORAL EVERY 8 HOURS SCHEDULED
Status: DISCONTINUED | OUTPATIENT
Start: 2025-01-06 | End: 2025-01-09 | Stop reason: HOSPADM

## 2025-01-06 RX ORDER — KETAMINE HYDROCHLORIDE 50 MG/ML
INJECTION, SOLUTION INTRAMUSCULAR; INTRAVENOUS
Status: DISCONTINUED | OUTPATIENT
Start: 2025-01-06 | End: 2025-01-06 | Stop reason: SDUPTHER

## 2025-01-06 RX ORDER — MIDAZOLAM HYDROCHLORIDE 1 MG/ML
INJECTION, SOLUTION INTRAMUSCULAR; INTRAVENOUS
Status: DISCONTINUED | OUTPATIENT
Start: 2025-01-06 | End: 2025-01-06 | Stop reason: SDUPTHER

## 2025-01-06 RX ORDER — ONDANSETRON 2 MG/ML
4 INJECTION INTRAMUSCULAR; INTRAVENOUS ONCE
Status: COMPLETED | OUTPATIENT
Start: 2025-01-06 | End: 2025-01-06

## 2025-01-06 RX ADMIN — LIDOCAINE HYDROCHLORIDE 100 MG: 20 INJECTION, SOLUTION EPIDURAL; INFILTRATION; INTRACAUDAL; PERINEURAL at 13:11

## 2025-01-06 RX ADMIN — DICYCLOMINE HYDROCHLORIDE 10 MG: 10 CAPSULE ORAL at 05:55

## 2025-01-06 RX ADMIN — MORPHINE SULFATE 2 MG: 2 INJECTION, SOLUTION INTRAMUSCULAR; INTRAVENOUS at 16:27

## 2025-01-06 RX ADMIN — MORPHINE SULFATE 2 MG: 2 INJECTION, SOLUTION INTRAMUSCULAR; INTRAVENOUS at 07:46

## 2025-01-06 RX ADMIN — SODIUM CHLORIDE, PRESERVATIVE FREE 10 ML: 5 INJECTION INTRAVENOUS at 20:57

## 2025-01-06 RX ADMIN — INSULIN LISPRO 8 UNITS: 100 INJECTION, SOLUTION INTRAVENOUS; SUBCUTANEOUS at 20:57

## 2025-01-06 RX ADMIN — MORPHINE SULFATE 2 MG: 2 INJECTION, SOLUTION INTRAMUSCULAR; INTRAVENOUS at 20:57

## 2025-01-06 RX ADMIN — PANTOPRAZOLE SODIUM 40 MG: 40 INJECTION, POWDER, LYOPHILIZED, FOR SOLUTION INTRAVENOUS at 16:53

## 2025-01-06 RX ADMIN — MIDAZOLAM 2 MG: 1 INJECTION INTRAMUSCULAR; INTRAVENOUS at 13:08

## 2025-01-06 RX ADMIN — FAMOTIDINE 20 MG: 10 INJECTION, SOLUTION INTRAVENOUS at 12:46

## 2025-01-06 RX ADMIN — SUCRALFATE 1 G: 1 TABLET ORAL at 16:44

## 2025-01-06 RX ADMIN — KETAMINE HYDROCHLORIDE 30 MG: 50 INJECTION, SOLUTION INTRAMUSCULAR; INTRAVENOUS at 13:09

## 2025-01-06 RX ADMIN — DICYCLOMINE HYDROCHLORIDE 10 MG: 10 CAPSULE ORAL at 20:56

## 2025-01-06 RX ADMIN — Medication 6 MG: at 20:56

## 2025-01-06 RX ADMIN — GABAPENTIN 300 MG: 300 CAPSULE ORAL at 14:01

## 2025-01-06 RX ADMIN — PROMETHAZINE HYDROCHLORIDE 25 MG: 25 TABLET ORAL at 15:27

## 2025-01-06 RX ADMIN — PROPOFOL 60 MG: 10 INJECTION, EMULSION INTRAVENOUS at 13:11

## 2025-01-06 RX ADMIN — KETAMINE HYDROCHLORIDE 20 MG: 50 INJECTION, SOLUTION INTRAMUSCULAR; INTRAVENOUS at 13:11

## 2025-01-06 RX ADMIN — HYDRALAZINE HYDROCHLORIDE 25 MG: 25 TABLET ORAL at 13:59

## 2025-01-06 RX ADMIN — DOXYCYCLINE 100 MG: 100 INJECTION, POWDER, LYOPHILIZED, FOR SOLUTION INTRAVENOUS at 11:03

## 2025-01-06 RX ADMIN — SUCRALFATE 1 G: 1 TABLET ORAL at 20:56

## 2025-01-06 RX ADMIN — SODIUM CHLORIDE: 9 INJECTION, SOLUTION INTRAVENOUS at 06:03

## 2025-01-06 RX ADMIN — MORPHINE SULFATE 2 MG: 2 INJECTION, SOLUTION INTRAMUSCULAR; INTRAVENOUS at 11:50

## 2025-01-06 RX ADMIN — DICYCLOMINE HYDROCHLORIDE 10 MG: 10 CAPSULE ORAL at 16:43

## 2025-01-06 RX ADMIN — GABAPENTIN 300 MG: 300 CAPSULE ORAL at 20:57

## 2025-01-06 RX ADMIN — INSULIN LISPRO 4 UNITS: 100 INJECTION, SOLUTION INTRAVENOUS; SUBCUTANEOUS at 16:44

## 2025-01-06 RX ADMIN — PANTOPRAZOLE SODIUM 40 MG: 40 INJECTION, POWDER, LYOPHILIZED, FOR SOLUTION INTRAVENOUS at 05:56

## 2025-01-06 RX ADMIN — MORPHINE SULFATE 2 MG: 2 INJECTION, SOLUTION INTRAMUSCULAR; INTRAVENOUS at 03:39

## 2025-01-06 RX ADMIN — ONDANSETRON 4 MG: 2 INJECTION INTRAMUSCULAR; INTRAVENOUS at 12:45

## 2025-01-06 RX ADMIN — HYDRALAZINE HYDROCHLORIDE 25 MG: 25 TABLET ORAL at 20:56

## 2025-01-06 RX ADMIN — SODIUM CHLORIDE: 9 INJECTION, SOLUTION INTRAVENOUS at 18:14

## 2025-01-06 RX ADMIN — INSULIN LISPRO 4 UNITS: 100 INJECTION, SOLUTION INTRAVENOUS; SUBCUTANEOUS at 11:00

## 2025-01-06 RX ADMIN — INSULIN LISPRO 2 UNITS: 100 INJECTION, SOLUTION INTRAVENOUS; SUBCUTANEOUS at 07:58

## 2025-01-06 ASSESSMENT — PAIN SCALES - GENERAL
PAINLEVEL_OUTOF10: 3
PAINLEVEL_OUTOF10: 5
PAINLEVEL_OUTOF10: 8
PAINLEVEL_OUTOF10: 8
PAINLEVEL_OUTOF10: 10
PAINLEVEL_OUTOF10: 7
PAINLEVEL_OUTOF10: 7

## 2025-01-06 ASSESSMENT — PAIN - FUNCTIONAL ASSESSMENT
PAIN_FUNCTIONAL_ASSESSMENT: PREVENTS OR INTERFERES SOME ACTIVE ACTIVITIES AND ADLS
PAIN_FUNCTIONAL_ASSESSMENT: ADULT NONVERBAL PAIN SCALE (NPVS)
PAIN_FUNCTIONAL_ASSESSMENT: ACTIVITIES ARE NOT PREVENTED
PAIN_FUNCTIONAL_ASSESSMENT: 0-10

## 2025-01-06 ASSESSMENT — ENCOUNTER SYMPTOMS
EYE REDNESS: 0
VOMITING: 1
SHORTNESS OF BREATH: 0
CHOKING: 0
FACIAL SWELLING: 0
BACK PAIN: 0
EYE ITCHING: 0
COUGH: 0
EYE PAIN: 0
SHORTNESS OF BREATH: 0
DIARRHEA: 0
ABDOMINAL DISTENTION: 0
ABDOMINAL PAIN: 0
CHEST TIGHTNESS: 0
BLOOD IN STOOL: 0
NAUSEA: 1

## 2025-01-06 ASSESSMENT — PAIN DESCRIPTION - LOCATION
LOCATION: ABDOMEN;GROIN;HIP
LOCATION: ABDOMEN
LOCATION: HIP

## 2025-01-06 ASSESSMENT — PAIN DESCRIPTION - DESCRIPTORS
DESCRIPTORS: BURNING
DESCRIPTORS: ACHING

## 2025-01-06 ASSESSMENT — PAIN DESCRIPTION - ORIENTATION
ORIENTATION: LEFT;MID
ORIENTATION: MID

## 2025-01-06 NOTE — PROGRESS NOTES
Take 1 capsule by mouth 3 times daily.   Yes Arsen Owens MD   midodrine (PROAMATINE) 2.5 MG tablet Take 1 tablet by mouth 2 times daily as needed (for hypotension)   Yes Arsen Owens MD   cholecalciferol (VITAMIN D3) 400 UNIT TABS tablet Take 5 tablets by mouth daily   Yes Arsen Owens MD   dicyclomine (BENTYL) 10 MG capsule Take 1 capsule by mouth 4 times daily (before meals and nightly)   Yes Arsen Owens MD   buPROPion (WELLBUTRIN XL) 150 MG extended release tablet Take 1 tablet by mouth every morning 12/9/23  Yes Shade Griffith MD   FLUoxetine (PROZAC) 40 MG capsule Take 1 capsule by mouth daily 8/26/23  Yes June Black MD   fondaparinux (ARIXTRA) 7.5 MG/0.6ML SOLN injection Inject 0.6 mLs into the skin daily 12/19/24   Wilner Ash MD   acetaminophen (TYLENOL) 500 MG tablet Take 2 tablets by mouth 4 times daily as needed for Pain 8/16/24 8/26/24  Rafal Justin MD   INSULIN LISPRO IJ Inject as directed Takes 6 units with meals    Arsen Owens MD   melatonin 3 MG TABS tablet Take 2 tablets by mouth nightly as needed    Arsen Owens MD   LORazepam (ATIVAN) 1 MG tablet Take 0.5 tablets by mouth every 8 hours as needed for Anxiety.    Arsen Owens MD   cholestyramine (QUESTRAN) 4 g packet Take 1 packet by mouth daily  Patient not taking: Reported on 12/20/2024    Arsen Owens MD   cyanocobalamin 1000 MCG tablet Take 1 tablet by mouth daily    Arsen Owens MD   Syringe/Needle, Disp, (SYRINGE 3CC/25GX1\") 25G X 1\" 3 ML MISC To be used with B12 injections monthly 2/13/21   Sandy Rogel MD   pravastatin (PRAVACHOL) 40 MG tablet TAKE 1 TABLET(40 MG) BY MOUTH DAILY  Patient taking differently: Take 1 tablet by mouth at bedtime 6/26/20   Sandy Rogel MD   glucose monitoring kit (FREESTYLE) monitoring kit Testing twice a day.  Please dispense according to patients insurance. 12/20/19   Sandy Rogel MD  of vomiting last night.  Just able to tolerate few pieces of cottage cheese.  Liver ultrasound is unremarkable.  IV Doxy for complicated hip arthroplasty infection.  Needs to follow-up at [facility] orthopedic surgery.  No obvious bleeding noted.  Hemoglobin has been stable, labs are pending for today.      1/6  Patient seen and examined  Patient intermittently hypertensive add hydralazine  On IV doxycycline as per ID  Continues to have abdominal pain, currently n.p.o. GI planning EGD, hemoglobin is stable, resume fondaparinux after the procedure next  No chest pain shortness of breath

## 2025-01-06 NOTE — PROGRESS NOTES
Maternal Aunt     Cancer Maternal Uncle         of duodenum had whipple    Breast Cancer Maternal Cousin       Medical Decision Making:   I have independently reviewed/ordered the following labs:    CBC with Differential:   Recent Labs     01/04/25  0744 01/06/25  0647   WBC 6.9 5.0   HGB 12.8 11.9*   HCT 41.6 37.7    160   LYMPHOPCT 28 36   MONOPCT 7 9*   EOSPCT 3 6*     BMP:  Recent Labs     01/04/25  1032 01/06/25  0647    141   K 3.7 3.8   * 111*   CO2 19* 20   BUN 4* 7   CREATININE 0.5* 0.5*     Hepatic Function Panel:   Recent Labs     01/06/25  0647   BILIDIR <0.1   IBILI Can not be calculated   BILITOT <0.2   ALKPHOS 199*   ALT 38*   AST 61*     No results for input(s): \"RPR\" in the last 72 hours.  No results for input(s): \"HIV\" in the last 72 hours.  No results for input(s): \"BC\" in the last 72 hours.  Lab Results   Component Value Date/Time    CREATININE 0.5 01/06/2025 06:47 AM    GLUCOSE 286 01/06/2025 06:47 AM    GLUCOSE 403 06/03/2012 04:17 AM       Detailed results:        Thank you for allowing us to participate in the care of this patient.Please call with questions.    This note is created with the assistance of a speech recognition program.  While intending to generate adocument that actually reflects the content of the visit, the document can still have some errors including those of syntax and sound a like substitutions which may escape proof reading.  It such instances, actual meaningcan be extrapolated by contextual diversion.    Jerome Flannery MD  Office: [phone]  Perfect serve / office [phone]

## 2025-01-06 NOTE — ANESTHESIA PRE PROCEDURE
Department of Anesthesiology  Preprocedure Note       Name:  Krista Chiang   Age:  44 y.o.  :  1980                                          MRN:  [id number]         Date:  2025      Surgeon: Surgeon(s):  Stefani Jensen MD    Procedure: Procedure(s):  ESOPHAGOGASTRODUODENOSCOPY BIOPSY    Medications prior to admission:   Prior to Admission medications    Medication Sig Start Date End Date Taking? Authorizing Provider   omeprazole (PRILOSEC) 20 MG delayed release capsule TAKE 1 CAPSULE BY MOUTH TWICE DAILY 24  Yes Brianne Lemus, PA   doxycycline monohydrate (MONODOX) 100 MG capsule Take 1 capsule by mouth every 12 hours for 28 days 24 Yes Wilner Ash MD   tiZANidine (ZANAFLEX) 4 MG tablet Take 1 tablet by mouth 3 times daily as needed (Back pain) 10/4/24  Yes Alexis Barnes MD   dilTIAZem (CARDIZEM CD) 120 MG extended release capsule Take 1 capsule by mouth daily   Yes ProviderArsen MD   gabapentin (NEURONTIN) 300 MG capsule Take 1 capsule by mouth 3 times daily.   Yes Provider, MD Arsen   midodrine (PROAMATINE) 2.5 MG tablet Take 1 tablet by mouth 2 times daily as needed (for hypotension)   Yes ProviderArsen MD   cholecalciferol (VITAMIN D3) 400 UNIT TABS tablet Take 5 tablets by mouth daily   Yes Provider, MD Arsen   dicyclomine (BENTYL) 10 MG capsule Take 1 capsule by mouth 4 times daily (before meals and nightly)   Yes ProviderArsen MD   buPROPion (WELLBUTRIN XL) 150 MG extended release tablet Take 1 tablet by mouth every morning 23  Yes Shade Griffith MD   FLUoxetine (PROZAC) 40 MG capsule Take 1 capsule by mouth daily 23  Yes June Black MD   fondaparinux (ARIXTRA) 7.5 MG/0.6ML SOLN injection Inject 0.6 mLs into the skin daily 24   Wilner Ash MD   acetaminophen (TYLENOL) 500 MG tablet Take 2 tablets by mouth 4 times daily as needed for Pain 24  Rafal Justin MD   INSULIN LISPRO IJ Inject

## 2025-01-06 NOTE — PLAN OF CARE
Problem: Chronic Conditions and Co-morbidities  Goal: Patient's chronic conditions and co-morbidity symptoms are monitored and maintained or improved  1/6/2025 1123 by Maribel Srinivasan RN  Outcome: Progressing     Problem: Discharge Planning  Goal: Discharge to home or other facility with appropriate resources  1/6/2025 1123 by Maribel Srinivasan RN  Outcome: Progressing     Problem: Skin/Tissue Integrity  Goal: Absence of new skin breakdown  Description: 1.  Monitor for areas of redness and/or skin breakdown  2.  Assess vascular access sites hourly  3.  Every 4-6 hours minimum:  Change oxygen saturation probe site  4.  Every 4-6 hours:  If on nasal continuous positive airway pressure, respiratory therapy assess nares and determine need for appliance change or resting period.  1/6/2025 1123 by Maribel Srinivasan RN  Outcome: Progressing     Problem: Safety - Adult  Goal: Free from fall injury  1/6/2025 1123 by Maribel Srinivasan RN  Outcome: Progressing     Problem: Pain  Goal: Verbalizes/displays adequate comfort level or baseline comfort level  1/6/2025 1123 by Maribel Srinivasan RN  Outcome: Progressing     Problem: ABCDS Injury Assessment  Goal: Absence of physical injury  1/6/2025 1123 by Maribel Srinivasan RN  Outcome: Progressing

## 2025-01-06 NOTE — OP NOTE
.PROCEDURE NOTE    DATE OF PROCEDURE: 1/6/2025     SURGEON: Stefani Jensen MD    ASSISTANT: None    PREOPERATIVE DIAGNOSIS: EPIGASTRIC PAINS  HX OF GASTRIC BYPASS  HX OF ANASTOMOTIC ULCERS    POSTOPERATIVE DIAGNOSIS: As described below    OPERATION: Upper GI endoscopy with Biopsy    ANESTHESIA: MAC PER ANESTHESIA     ESTIMATED BLOOD LOSS: Less than 50 ml    COMPLICATIONS: None.     SPECIMENS:  Was Obtained:     HISTORY: The patient is a 44 y.o. year old female with history of above preop diagnosis.  I recommended esophagogastroduodenoscopy with possible biopsy and I explained the risk, benefits, expected outcome, and alternatives to the procedure.  Risks included but are not limited to bleeding, infection, respiratory distress, hypotension, and perforation of the esophagus, stomach, or duodenum.  Patient understands and is in agreement.    PROCEDURE: The patient was given IV conscious sedation.  The patient's SPO2 remained above 90% throughout the procedure. The gastroscope was inserted orally and advanced under direct vision through the esophagus, through the stomach, through the pylorus, and into the descending duodenum.      Findings:    Retropharyngeal area was grossly normal appearing    Esophagus: normal    Stomach:  EVIDENCE OF GASTRIC BYPASS  EVIDENCE OF A PROMINENT 1.5 CM ULCERATION UNDER A FOLD AT THE ANASTOMOTIC AREA WAS NOTED  ULCER HAD WHITE EXUDATES SLIGHTLY EDEMATOUS MARGINS  APPEARED BENIGN  MULTIPLE PICTURES WERE TAKEN   BIOPSIES WERE TAKEN FROM THE MARGINS  SMALL BOWEL LOOPS WERE NORMAL APPEARING     The scope was removed and the patient tolerated the procedure well.     Recommendations/Plan:   F/U Biopsies  PPI BID  F/U In Office in 3-4 weeks  Discussed with the family  Post sedation patient was stable with stable vital signs and stable O2 saturations    Electronically signed by Stefani Jensen MD  on 1/6/2025 at 1:17 PM

## 2025-01-06 NOTE — PROGRESS NOTES
Physical Therapy        Physical Therapy Cancel Note      DATE: 2025    NAME: Krista Chiang  MRN: [id number]   : 1980      Patient not seen this date for Physical Therapy due to:    2025 at 1302-  Pt out of room at surgery for an EGD w/ biopsy.      Electronically signed by Brandy Hendrix PT on 2025 at 1:02 PM

## 2025-01-06 NOTE — PROGRESS NOTES
[facility]   Occupational Therapy Evaluation  Date: 25  Patient Name: Krista Chiang       Room: 2045/2045-01  MRN: [id number]  Account: [id number]   : 1980  (44 y.o.) Gender: female     Discharge Recommendations:  Further Occupational Therapy is recommended upon facility discharge.    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 encounter, Constipation, Depression, Fibromyalgia, Genital herpes, unspecified, GERD (gastroesophageal reflux disease), H/O bariatric surgery, History of pulmonary embolism, Hx of blood clots, Hypertension, Lives in nursing home, Lumbosacral spondylosis without myelopathy, MDRO (multiple drug resistant organisms) resistance, Mobility impaired, MRSA (methicillin resistant staph aureus) culture positive, Muscle weakness, Obsessive-compulsive disorder, unspecified, Opioid abuse, in remission ([facility]), Pernicious anemia, Polyneuropathy, unspecified, PTSD (post-traumatic stress disorder), Pulmonary embolism ([facility]), Suicidal behavior, SVT (supraventricular tachycardia) ([facility]), Type 2 diabetes mellitus, with long-term current use of insulin ([facility]), Under care of team, and Under care of team.    Past Surgical History:   has a past surgical history that includes Cholecystectomy; Liver Resection (); Tonsillectomy; Abdominal hernia repair (); Abdominal hernia repair (2014); Bariatric Surgery (2013); Dilation and curettage of uterus (); Finger amputation (Left, 2015); lymph node biopsy (); Total abdominal hysterectomy w/ bilateral salpingoophorectomy (); IVC filter insertion;  Goals   Patient goals : \"Be able to get up and go to the bathroom\"  Short Term Goals  Time Frame for Short Term Goals: By discharge  Short Term Goal 1: Pt will perform BADLs at Supervision using AE/adaptive techniques PRN and good safety  Short Term Goal 2: Pt will perform functional transfers and mobility at supervision using LRAD and good safety  Short Term Goal 3: Pt will engage in 8+ mins of static/dynamic standing during functional activity to improve balance and endurance for  IADLs  Short Term Goal 4: Pt will engage in 15+ mins of therapeutic exercise/ activity of choice to improve activity tolerance and overall quality of life  Short Term Goal 5: Pt will verbalize/demo 2-3 non-pharmaceutical pain management strategies for functional activities to improve health and wellness.    Plan  Occupational Therapy Plan  Times Per Week: 4-6  Times Per Day: Once a day  Current Treatment Recommendations: Strengthening, Balance training, Functional mobility training, Endurance training, Pain management, Safety education & training, Patient/Caregiver education & training, Equipment evaluation, education, & procurement, Positioning, Self-Care / ADL, Home management training, Cognitive/Perceptual training, Coordination training      OT Individual Minutes  OT Individual Minutes  Time In: 0853  Time Out: 0937  Minutes: 44  Time Code Minutes   Timed Code Treatment Minutes: 32 Minutes        Electronically signed by KATHLEEN Ramirez on 1/6/25 at 11:32 AM EST

## 2025-01-06 NOTE — ANESTHESIA POSTPROCEDURE EVALUATION
Department of Anesthesiology  Postprocedure Note    Patient: Krista Chiang  MRN: [id number]  YOB: 1980  Date of evaluation: 1/6/2025    Procedure Summary       Date: 01/06/25 Room / Location: [place] [address] / [facility]    Anesthesia Start: 1308 Anesthesia Stop: 1323    Procedure: ESOPHAGOGASTRODUODENOSCOPY BIOPSY (Esophagus) Diagnosis:       Epigastric pain      (Epigastric pain [R10.13])    Surgeons: Stefani Jensen MD Responsible Provider: Mariah Alba MD    Anesthesia Type: general ASA Status: 3            Anesthesia Type: No value filed.    Aston Phase I: Aston Score: 10    Aston Phase II:      Anesthesia Post Evaluation    Comments: POST- ANESTHESIA EVALUATION       Pt Name: Krista Chiang  MRN: [id number]  YOB: 1980  Date of evaluation: 1/6/2025  Time:  3:25 PM      /87   Pulse 80   Temp 98 °F (36.7 °C)   Resp 16   Ht 1.727 m (5' 8\")   Wt 95.3 kg (210 lb)   SpO2 99%   BMI 31.93 kg/m²      Consciousness Level  Awake  Cardiopulmonary Status  Stable  Pain Adequately Treated YES  Nausea / Vomiting  NO  Adequate Hydration  YES  Anesthesia Related Complications NONE      Electronically signed by Mariah Alba MD on 1/6/2025 at 3:25 PM           No notable events documented.

## 2025-01-06 NOTE — CARE COORDINATION
ONGOING DISCHARGE PLAN:    Patient is alert and oriented x4.    Spoke with patient regarding discharge plan and patient confirms that plan is still home with [facility].    IV doxy per ID, oral at discharge    GI consult  Recent EGD on 12/18  Oral carafate    1/6 EGD today    Wound care    PT/OT      Will continue to follow for additional discharge needs.    If patient is discharged prior to next notation, then this note serves as note for discharge by case management.    Electronically signed by Lynne Lui RN on 1/6/2025 at 10:07 AM

## 2025-01-06 NOTE — PLAN OF CARE
Problem: Chronic Conditions and Co-morbidities  Goal: Patient's chronic conditions and co-morbidity symptoms are monitored and maintained or improved  1/6/2025 0318 by Rosalie Ortez RN  Outcome: Progressing   Maintaining  Problem: Discharge Planning  Goal: Discharge to home or other facility with appropriate resources  1/6/2025 0318 by Rosalie Ortez RN  Outcome: Progressing   Patient actively participates in ADL's and decision making regarding plan of care  Problem: Skin/Tissue Integrity  Goal: Absence of new skin breakdown  Description: 1.  Monitor for areas of redness and/or skin breakdown  2.  Assess vascular access sites hourly  3.  Every 4-6 hours minimum:  Change oxygen saturation probe site  4.  Every 4-6 hours:  If on nasal continuous positive airway pressure, respiratory therapy assess nares and determine need for appliance change or resting period.  1/6/2025 0318 by Rosalie Ortez RN  Outcome: Progressing   No new skin breakdown noted, no new signs/symptoms of infection, continue to monitor labwork including WBC, medications administered per physician orders  Problem: Safety - Adult  Goal: Free from fall injury  1/6/2025 0318 by Rosalie Ortez RN  Outcome: Progressing   No falls/injuries this shift, bed in lowest position, brakes on, bed alarm on, call light in reach, side rails up x2  Problem: Pain  Goal: Verbalizes/displays adequate comfort level or baseline comfort level  1/6/2025 0318 by Rosalie Ortez RN  Outcome: Progressing   No new signs/symptoms of pain noted, pain rating < 3 on scale 0-10, pain controlled with medication/repositioning  Problem: ABCDS Injury Assessment  Goal: Absence of physical injury  1/6/2025 0318 by Rosalie Ortez RN  Outcome: Progressing   No falls/injuries this shift, bed in lowest position, brakes on, bed alarm on, call light in reach, side rails up x2

## 2025-01-07 LAB
ALBUMIN SERPL-MCNC: 2 G/DL (ref 3.5–5.2)
ALP SERPL-CCNC: 192 U/L (ref 35–104)
ALT SERPL-CCNC: 46 U/L (ref 10–35)
ANION GAP SERPL CALCULATED.3IONS-SCNC: 7 MMOL/L (ref 9–16)
AST SERPL-CCNC: 62 U/L (ref 10–35)
BASOPHILS # BLD: 0 K/UL (ref 0–0.2)
BASOPHILS NFR BLD: 1 % (ref 0–2)
BILIRUB DIRECT SERPL-MCNC: <0.1 MG/DL (ref 0–0.3)
BILIRUB INDIRECT SERPL-MCNC: ABNORMAL MG/DL (ref 0–1)
BILIRUB SERPL-MCNC: <0.2 MG/DL (ref 0–1.2)
BUN SERPL-MCNC: 7 MG/DL (ref 6–20)
CALCIUM SERPL-MCNC: 7.6 MG/DL (ref 8.6–10.4)
CHLORIDE SERPL-SCNC: 114 MMOL/L (ref 98–107)
CO2 SERPL-SCNC: 23 MMOL/L (ref 20–31)
CREAT SERPL-MCNC: 0.5 MG/DL (ref 0.7–1.2)
EOSINOPHIL # BLD: 0.3 K/UL (ref 0–0.4)
EOSINOPHILS RELATIVE PERCENT: 7 % (ref 0–4)
ERYTHROCYTE [DISTWIDTH] IN BLOOD BY AUTOMATED COUNT: 18.6 % (ref 11.5–14.9)
GFR, ESTIMATED: >90 ML/MIN/1.73M2
GLUCOSE BLD-MCNC: 174 MG/DL (ref 65–105)
GLUCOSE BLD-MCNC: 185 MG/DL (ref 65–105)
GLUCOSE BLD-MCNC: 190 MG/DL (ref 65–105)
GLUCOSE BLD-MCNC: 258 MG/DL (ref 65–105)
GLUCOSE SERPL-MCNC: 212 MG/DL (ref 74–99)
HCT VFR BLD AUTO: 32.9 % (ref 36–46)
HGB BLD-MCNC: 10.3 G/DL (ref 12–16)
LYMPHOCYTES NFR BLD: 1.1 K/UL (ref 1–4.8)
LYMPHOCYTES RELATIVE PERCENT: 24 % (ref 24–44)
MCH RBC QN AUTO: 25.2 PG (ref 26–34)
MCHC RBC AUTO-ENTMCNC: 31.4 G/DL (ref 31–37)
MCV RBC AUTO: 80 FL (ref 80–100)
MONOCYTES NFR BLD: 0.4 K/UL (ref 0.1–1.3)
MONOCYTES NFR BLD: 8 % (ref 1–7)
NEUTROPHILS NFR BLD: 60 % (ref 36–66)
NEUTS SEG NFR BLD: 2.9 K/UL (ref 1.3–9.1)
PLATELET # BLD AUTO: 200 K/UL (ref 150–450)
PMV BLD AUTO: 9.5 FL (ref 6–12)
POTASSIUM SERPL-SCNC: 3.6 MMOL/L (ref 3.7–5.3)
PROT SERPL-MCNC: 4.9 G/DL (ref 6.6–8.7)
RBC # BLD AUTO: 4.11 M/UL (ref 4–5.2)
SODIUM SERPL-SCNC: 144 MMOL/L (ref 136–145)
WBC OTHER # BLD: 4.8 K/UL (ref 3.5–11)

## 2025-01-07 PROCEDURE — 6370000000 HC RX 637 (ALT 250 FOR IP): Performed by: INTERNAL MEDICINE

## 2025-01-07 PROCEDURE — 6360000002 HC RX W HCPCS: Performed by: INTERNAL MEDICINE

## 2025-01-07 PROCEDURE — 2500000003 HC RX 250 WO HCPCS: Performed by: INTERNAL MEDICINE

## 2025-01-07 PROCEDURE — 97110 THERAPEUTIC EXERCISES: CPT

## 2025-01-07 PROCEDURE — 99233 SBSQ HOSP IP/OBS HIGH 50: CPT | Performed by: INTERNAL MEDICINE

## 2025-01-07 PROCEDURE — 85025 COMPLETE CBC W/AUTO DIFF WBC: CPT

## 2025-01-07 PROCEDURE — 80076 HEPATIC FUNCTION PANEL: CPT

## 2025-01-07 PROCEDURE — 80048 BASIC METABOLIC PNL TOTAL CA: CPT

## 2025-01-07 PROCEDURE — 99231 SBSQ HOSP IP/OBS SF/LOW 25: CPT | Performed by: INTERNAL MEDICINE

## 2025-01-07 PROCEDURE — 2580000003 HC RX 258: Performed by: INTERNAL MEDICINE

## 2025-01-07 PROCEDURE — APPSS30 APP SPLIT SHARED TIME 16-30 MINUTES: Performed by: NURSE PRACTITIONER

## 2025-01-07 PROCEDURE — 36415 COLL VENOUS BLD VENIPUNCTURE: CPT

## 2025-01-07 PROCEDURE — 82947 ASSAY GLUCOSE BLOOD QUANT: CPT

## 2025-01-07 PROCEDURE — 97530 THERAPEUTIC ACTIVITIES: CPT

## 2025-01-07 PROCEDURE — 1200000000 HC SEMI PRIVATE

## 2025-01-07 RX ORDER — SUCRALFATE 1 G/1
1 TABLET ORAL 4 TIMES DAILY
Qty: 120 TABLET | Refills: 3 | Status: SHIPPED | OUTPATIENT
Start: 2025-01-07

## 2025-01-07 RX ORDER — FONDAPARINUX SODIUM 7.5 MG/.6ML
7.5 INJECTION SUBCUTANEOUS DAILY
Status: DISCONTINUED | OUTPATIENT
Start: 2025-01-07 | End: 2025-01-09 | Stop reason: HOSPADM

## 2025-01-07 RX ADMIN — SUCRALFATE 1 G: 1 TABLET ORAL at 22:15

## 2025-01-07 RX ADMIN — DOXYCYCLINE 100 MG: 100 INJECTION, POWDER, LYOPHILIZED, FOR SOLUTION INTRAVENOUS at 01:09

## 2025-01-07 RX ADMIN — SUCRALFATE 1 G: 1 TABLET ORAL at 08:36

## 2025-01-07 RX ADMIN — HYDRALAZINE HYDROCHLORIDE 25 MG: 25 TABLET ORAL at 05:32

## 2025-01-07 RX ADMIN — DOXYCYCLINE 100 MG: 100 INJECTION, POWDER, LYOPHILIZED, FOR SOLUTION INTRAVENOUS at 13:52

## 2025-01-07 RX ADMIN — PANTOPRAZOLE SODIUM 40 MG: 40 INJECTION, POWDER, LYOPHILIZED, FOR SOLUTION INTRAVENOUS at 16:13

## 2025-01-07 RX ADMIN — DICYCLOMINE HYDROCHLORIDE 10 MG: 10 CAPSULE ORAL at 11:52

## 2025-01-07 RX ADMIN — INSULIN LISPRO 4 UNITS: 100 INJECTION, SOLUTION INTRAVENOUS; SUBCUTANEOUS at 08:35

## 2025-01-07 RX ADMIN — GABAPENTIN 300 MG: 300 CAPSULE ORAL at 13:45

## 2025-01-07 RX ADMIN — PROMETHAZINE HYDROCHLORIDE 25 MG: 25 TABLET ORAL at 08:36

## 2025-01-07 RX ADMIN — INSULIN LISPRO 4 UNITS: 100 INJECTION, SOLUTION INTRAVENOUS; SUBCUTANEOUS at 11:53

## 2025-01-07 RX ADMIN — GABAPENTIN 300 MG: 300 CAPSULE ORAL at 08:36

## 2025-01-07 RX ADMIN — SUCRALFATE 1 G: 1 TABLET ORAL at 13:45

## 2025-01-07 RX ADMIN — PANTOPRAZOLE SODIUM 40 MG: 40 INJECTION, POWDER, LYOPHILIZED, FOR SOLUTION INTRAVENOUS at 05:32

## 2025-01-07 RX ADMIN — MORPHINE SULFATE 2 MG: 2 INJECTION, SOLUTION INTRAMUSCULAR; INTRAVENOUS at 17:53

## 2025-01-07 RX ADMIN — SODIUM CHLORIDE: 9 INJECTION, SOLUTION INTRAVENOUS at 17:53

## 2025-01-07 RX ADMIN — GABAPENTIN 300 MG: 300 CAPSULE ORAL at 22:15

## 2025-01-07 RX ADMIN — HYDRALAZINE HYDROCHLORIDE 25 MG: 25 TABLET ORAL at 22:15

## 2025-01-07 RX ADMIN — MORPHINE SULFATE 2 MG: 2 INJECTION, SOLUTION INTRAMUSCULAR; INTRAVENOUS at 05:32

## 2025-01-07 RX ADMIN — MORPHINE SULFATE 2 MG: 2 INJECTION, SOLUTION INTRAMUSCULAR; INTRAVENOUS at 13:45

## 2025-01-07 RX ADMIN — DOXYCYCLINE 100 MG: 100 INJECTION, POWDER, LYOPHILIZED, FOR SOLUTION INTRAVENOUS at 23:33

## 2025-01-07 RX ADMIN — BUPROPION HYDROCHLORIDE 150 MG: 150 TABLET, EXTENDED RELEASE ORAL at 08:36

## 2025-01-07 RX ADMIN — MORPHINE SULFATE 2 MG: 2 INJECTION, SOLUTION INTRAMUSCULAR; INTRAVENOUS at 01:08

## 2025-01-07 RX ADMIN — DICYCLOMINE HYDROCHLORIDE 10 MG: 10 CAPSULE ORAL at 05:32

## 2025-01-07 RX ADMIN — INSULIN LISPRO 8 UNITS: 100 INJECTION, SOLUTION INTRAVENOUS; SUBCUTANEOUS at 22:14

## 2025-01-07 RX ADMIN — HYDRALAZINE HYDROCHLORIDE 25 MG: 25 TABLET ORAL at 13:45

## 2025-01-07 RX ADMIN — DICYCLOMINE HYDROCHLORIDE 10 MG: 10 CAPSULE ORAL at 16:13

## 2025-01-07 RX ADMIN — DILTIAZEM HYDROCHLORIDE 120 MG: 120 CAPSULE, COATED, EXTENDED RELEASE ORAL at 08:36

## 2025-01-07 RX ADMIN — MORPHINE SULFATE 2 MG: 2 INJECTION, SOLUTION INTRAMUSCULAR; INTRAVENOUS at 09:36

## 2025-01-07 RX ADMIN — SODIUM CHLORIDE, PRESERVATIVE FREE 5 ML: 5 INJECTION INTRAVENOUS at 22:15

## 2025-01-07 RX ADMIN — SUCRALFATE 1 G: 1 TABLET ORAL at 16:13

## 2025-01-07 RX ADMIN — FLUOXETINE HYDROCHLORIDE 40 MG: 20 CAPSULE ORAL at 08:36

## 2025-01-07 RX ADMIN — DICYCLOMINE HYDROCHLORIDE 10 MG: 10 CAPSULE ORAL at 22:15

## 2025-01-07 RX ADMIN — FONDAPARINUX SODIUM 7.5 MG: 7.5 INJECTION SUBCUTANEOUS at 11:58

## 2025-01-07 RX ADMIN — MORPHINE SULFATE 2 MG: 2 INJECTION, SOLUTION INTRAMUSCULAR; INTRAVENOUS at 22:14

## 2025-01-07 ASSESSMENT — PAIN DESCRIPTION - DESCRIPTORS
DESCRIPTORS: THROBBING;BURNING
DESCRIPTORS: BURNING;SHARP
DESCRIPTORS: SHARP;BURNING
DESCRIPTORS: BURNING;SHARP
DESCRIPTORS: ACHING
DESCRIPTORS: ACHING
DESCRIPTORS: SHARP
DESCRIPTORS: SHARP;BURNING

## 2025-01-07 ASSESSMENT — PAIN DESCRIPTION - FREQUENCY: FREQUENCY: CONTINUOUS

## 2025-01-07 ASSESSMENT — ENCOUNTER SYMPTOMS
EYE REDNESS: 0
CHOKING: 0
DIARRHEA: 0
CHEST TIGHTNESS: 0
BACK PAIN: 0
SHORTNESS OF BREATH: 0
FACIAL SWELLING: 0
COUGH: 0
EYE ITCHING: 0
NAUSEA: 1
EYE PAIN: 0
ABDOMINAL PAIN: 0
BLOOD IN STOOL: 0
ABDOMINAL DISTENTION: 0
VOMITING: 1

## 2025-01-07 ASSESSMENT — PAIN SCALES - GENERAL
PAINLEVEL_OUTOF10: 2
PAINLEVEL_OUTOF10: 8
PAINLEVEL_OUTOF10: 7
PAINLEVEL_OUTOF10: 6
PAINLEVEL_OUTOF10: 7
PAINLEVEL_OUTOF10: 6
PAINLEVEL_OUTOF10: 0
PAINLEVEL_OUTOF10: 7

## 2025-01-07 ASSESSMENT — PAIN DESCRIPTION - LOCATION
LOCATION: ABDOMEN
LOCATION: ABDOMEN;HIP
LOCATION: ABDOMEN
LOCATION: ABDOMEN
LOCATION: ABDOMEN;HIP
LOCATION: ABDOMEN
LOCATION: HIP
LOCATION: ABDOMEN;HIP

## 2025-01-07 ASSESSMENT — PAIN DESCRIPTION - ORIENTATION
ORIENTATION: MID
ORIENTATION: LEFT
ORIENTATION: LEFT;MID
ORIENTATION: LEFT
ORIENTATION: RIGHT
ORIENTATION: LEFT

## 2025-01-07 ASSESSMENT — PAIN DESCRIPTION - PAIN TYPE: TYPE: CHRONIC PAIN

## 2025-01-07 ASSESSMENT — PAIN DESCRIPTION - ONSET: ONSET: ON-GOING

## 2025-01-07 NOTE — PLAN OF CARE
Problem: Chronic Conditions and Co-morbidities  Goal: Patient's chronic conditions and co-morbidity symptoms are monitored and maintained or improved  1/7/2025 1546 by Fabienne Uribe RN  Outcome: Progressing  1/7/2025 0504 by Leola Luciano RN  Outcome: Progressing  Flowsheets (Taken 1/6/2025 1945)  Care Plan - Patient's Chronic Conditions and Co-Morbidity Symptoms are Monitored and Maintained or Improved: Monitor and assess patient's chronic conditions and comorbid symptoms for stability, deterioration, or improvement     Problem: Discharge Planning  Goal: Discharge to home or other facility with appropriate resources  1/7/2025 1546 by Fabienne Uribe RN  Outcome: Progressing  1/7/2025 0504 by Leola Luciano RN  Outcome: Progressing  Flowsheets (Taken 1/6/2025 1945)  Discharge to home or other facility with appropriate resources: Identify barriers to discharge with patient and caregiver     Problem: Skin/Tissue Integrity  Goal: Absence of new skin breakdown  1/7/2025 1546 by Fabienne Uribe RN  Outcome: Progressing  1/7/2025 0504 by Leola Luciano RN  Outcome: Progressing     Problem: Safety - Adult  Goal: Free from fall injury  1/7/2025 1546 by Fabienne Uribe RN  Outcome: Progressing  1/7/2025 0504 by Leola Luciano RN  Outcome: Progressing     Problem: Pain  Goal: Verbalizes/displays adequate comfort level or baseline comfort level  1/7/2025 1546 by Fabienne Uribe RN  Outcome: Progressing  1/7/2025 0504 by Leola Luciano RN  Outcome: Progressing  Flowsheets (Taken 1/7/2025 0004)  Verbalizes/displays adequate comfort level or baseline comfort level:   Assess pain using appropriate pain scale   Encourage patient to monitor pain and request assistance   Implement non-pharmacological measures as appropriate and evaluate response   Administer analgesics based on type and severity of pain and evaluate response     Problem: ABCDS Injury Assessment  Goal: Absence of physical injury  1/7/2025 1546 by Fabienne Uribe RN  Outcome:

## 2025-01-07 NOTE — PROGRESS NOTES
Dr. Estrada notified that patient states she doesn't want to go home today due to her throwing up clear lq after eating. Was told to hold d/c home for now.

## 2025-01-07 NOTE — PROGRESS NOTES
Multiplanar multisequence MRI of the left hip was performed with and without the administration of intravenous contrast. COMPARISON: 12/15/2024, 11/26/2023, 04/13/2024 HISTORY: ORDERING SYSTEM PROVIDED HISTORY:  Left hip wound TECHNOLOGIST PROVIDED HISTORY: Left hip wound Reason for Exam:  Left hip wound FINDINGS: Evaluation on the postcontrast imaging is limited due to artifact and inhomogeneous fat suppression. Remote postoperative changes are present from cerclage wire fixation of the left proximal femur and resection of the femoral head.  Intramedullary marrow edema extends along the length of the left femoral diaphysis into the mid femoral diaphysis, which is nonspecific given the removal of the hip arthroplasty.  Infection not excluded. Bone marrow edema is present extending for a depth of 1 cm in the subcortical left acetabulum. Bone marrow signal in the remainder of the pelvis and right proximal femur is maintained. Soft Tissues:  Similar to the recent CT, there is a large lateral left hip wound with linear edema tracking along the wound.  This communicates to the left hip joint, wrapping around the posterior margin of the remnant proximal femur.  No discrete drainable abscess is present. Limited evaluation of the intrapelvic contents demonstrates no acute abnormality. Joints:  Edema and fluid in left hip joint communicating to the wound and soft tissues.  No right hip joint effusion.  Symphysis pubis is maintained. Sacroiliac joints are maintained.     1. Exam limited by artifact 2. Large lateral left hip wound with linear edema communicating around the posterior margin of the proximal femur to the left hip joint.  No discrete drainable abscess or fluid collection. 3. Intramedullary bone marrow edema extending from the proximal into the mid left femoral diaphysis is nonspecific.  Infection not excluded.     Echo (TTE) complete (PRN contrast/bubble/strain/3D)    Result Date: 12/16/2024  The left ventricle  as tolerated  -Trend HH  -PPI BID  -Antiemetics as needed  -Carafate QID  -Outpatient colonoscopy      2.Hx emigdio en y bypass     3. Elevated lft; hx etoh abuse and hepatic adenoma  Liver us and workup ordered   -LFTs downtrending, repeat LFTs pending  -EBV, chronic/reactivated  -US showed fatty infiltration     4.hx left hip infection S/P left hip I&D /placement of antibiotic cement beads on 8/21/2023 due to infection post hemiarthroplasty.   Mgt per primary service     May d/c per GI  Follow-up outpatient next 2-3 weeks  This plan was formulated in collaboration with Dr. Jensen    Explained to the patient and d/W Nursing Staff  Will F/U with you  Please call or Page for any issues or change in status  Thanks    This note is created with the assistance of the speech recognition program.  While intending to generate a document that actually reflects the content of the visit, document can still have some errors including those of syntax and sound like substitutions which may escape proof reading.  Actual meaning can be extrapolated by contextual diversion.    Electronically signed by BERNARDA Molina NP on 1/7/2025 at 9:41 AM

## 2025-01-07 NOTE — PROGRESS NOTES
pain, palpitations and leg swelling.   Gastrointestinal:  Positive for nausea and vomiting. Negative for abdominal distention, abdominal pain, blood in stool and diarrhea.   Genitourinary:  Negative for difficulty urinating, dyspareunia, dysuria, enuresis and flank pain.   Musculoskeletal:  Positive for gait problem. Negative for back pain.   Skin:  Positive for wound (left hip).   Neurological:  Negative for facial asymmetry, light-headedness, numbness and headaches.       Physical Exam:   BP (!) 154/90   Pulse 82   Temp 98.2 °F (36.8 °C) (Oral)   Resp 18   Ht 1.727 m (5' 8\")   Wt 95.3 kg (210 lb)   SpO2 99%   BMI 31.93 kg/m²   Temp (24hrs), Av.2 °F (36.8 °C), Min:97.8 °F (36.6 °C), Max:98.8 °F (37.1 °C)    Recent Labs     25  1233 25  1552 25  2039 25  0625   POCGLU 121* 243* 299* 190*       Intake/Output Summary (Last 24 hours) at 2025 1036  Last data filed at 2025 0846  Gross per 24 hour   Intake 640 ml   Output --   Net 640 ml       Physical Exam  Constitutional:       General: She is not in acute distress.     Appearance: Normal appearance. She is obese. She is not ill-appearing.   HENT:      Head: Normocephalic and atraumatic.      Nose: Nose normal.   Eyes:      Extraocular Movements: Extraocular movements intact.      Pupils: Pupils are equal, round, and reactive to light.   Abdominal:      General: There is no distension.      Palpations: There is no mass.      Tenderness: There is no abdominal tenderness.      Hernia: No hernia is present.      Comments: Incisions from previous surgery   Musculoskeletal:         General: No deformity or signs of injury.   Neurological:      Mental Status: She is alert and oriented to person, place, and time.         Investigations:     Laboratory Testing:  Recent Results (from the past 24 hour(s))   POC Glucose Fingerstick    Collection Time: 25 12:33 PM   Result Value Ref Range    POC Glucose 121 (H) 65 - 105 mg/dL   POC  low as reasonably achievable. COMPARISON: None HISTORY: ORDERING SYSTEM PROVIDED HISTORY: midline back pain post fall TECHNOLOGIST PROVIDED HISTORY: midline back pain post fall Decision Support Exception - unselect if not a suspected or confirmed emergency medical condition->Emergency Medical Condition (MA) Is the patient pregnant?->No Reason for Exam: midline back pain post fall Additional signs and symptoms: midline back pain post fall; ORDERING SYSTEM PROVIDED HISTORY: midline back pain, fall TECHNOLOGIST PROVIDED HISTORY: midline back pain, fall Is the patient pregnant?->No Reason for Exam: midline back pain post fall Additional signs and symptoms: midline back pain post fall FINDINGS: BONES/ALIGNMENT: Focal endplate deformity at the L1 superior endplate is most suggestive of which degenerative Schmorl's node is new since July 2023.  No acute fracture line visualized.  There is normal alignment of the spine. The vertebral body heights are maintained. No osseous destructive lesion is seen. DEGENERATIVE CHANGES: No significant degenerative changes of the lumbar spine. SOFT TISSUES/RETROPERITONEUM: Inferior vena cava filter is present.  There are bilateral renal sinus cysts.  There is been previous gastric surgery. July 18, 2023     Focal deformity of the L1 superior endplate is most suggestive of degenerative Schmorl's node, though is new since July 2023.  No acute fracture line visualized in the thoracic or lumbar spine.     CT Head W/O Contrast    Result Date: 12/15/2024  EXAMINATION: CT OF THE CERVICAL SPINE WITHOUT CONTRAST; CT OF THE HEAD WITHOUT CONTRAST 12/15/2024 3:23 pm TECHNIQUE: CT of the cervical spine was performed without the administration of intravenous contrast. Multiplanar reformatted images are provided for review. Automated exposure control, iterative reconstruction, and/or weight based adjustment of the mA/kV was utilized to reduce the radiation dose to as low as reasonably achievable.; CT of

## 2025-01-07 NOTE — PLAN OF CARE
Problem: Chronic Conditions and Co-morbidities  Goal: Patient's chronic conditions and co-morbidity symptoms are monitored and maintained or improved  Outcome: Progressing  Flowsheets (Taken 1/6/2025 1945)  Care Plan - Patient's Chronic Conditions and Co-Morbidity Symptoms are Monitored and Maintained or Improved: Monitor and assess patient's chronic conditions and comorbid symptoms for stability, deterioration, or improvement     Problem: Discharge Planning  Goal: Discharge to home or other facility with appropriate resources  Outcome: Progressing  Flowsheets (Taken 1/6/2025 1945)  Discharge to home or other facility with appropriate resources: Identify barriers to discharge with patient and caregiver     Problem: Skin/Tissue Integrity  Goal: Absence of new skin breakdown  Description: 1.  Monitor for areas of redness and/or skin breakdown  2.  Assess vascular access sites hourly  3.  Every 4-6 hours minimum:  Change oxygen saturation probe site  4.  Every 4-6 hours:  If on nasal continuous positive airway pressure, respiratory therapy assess nares and determine need for appliance change or resting period.  Outcome: Progressing     Problem: Safety - Adult  Goal: Free from fall injury  Outcome: Progressing     Problem: Pain  Goal: Verbalizes/displays adequate comfort level or baseline comfort level  Outcome: Progressing  Flowsheets (Taken 1/7/2025 0004)  Verbalizes/displays adequate comfort level or baseline comfort level:   Assess pain using appropriate pain scale   Encourage patient to monitor pain and request assistance   Implement non-pharmacological measures as appropriate and evaluate response   Administer analgesics based on type and severity of pain and evaluate response     Problem: ABCDS Injury Assessment  Goal: Absence of physical injury  Outcome: Progressing

## 2025-01-07 NOTE — PROGRESS NOTES
1/7/25 no [id number] only discharge Rx Sucralfate just filled 12/30/24 at [facility] [id number],PHONE #[phone] let Nurse María Elena know via phone esther

## 2025-01-07 NOTE — PROGRESS NOTES
Increased pain  Treatment Diagnosis: impaired mobility due to pain and debilitation/ weakness S/P left THR prosthesis removal  Therapy Prognosis: Fair  Decision Making: Medium Complexity  History: pt admitted due to abdominal pain and black stools  Exam: ROM, MMT and mobility assessments  Clinical Presentation: SBA x 1 due to lines for rolling in bed either direction using the rails; pt declined dangling at the EOB due to C/O nausea; HIGH FALL RISK; NO LEFT HIP JOINT (THR PROSTHESIS TAKEN OUT)  Barriers to Learning: pain  Discharge Recommendations: Patient would benefit from continued therapy after discharge  Activity Tolerance  Activity Tolerance: Patient limited by endurance, Patient limited by pain (nausea)     Patient Education  Patient Education  Education Given To: Patient  Education Provided: Role of Therapy, Plan of Care  Education Method: Demonstration, Verbal  Barriers to Learning: None  Education Outcome: Continued education needed     Goals  Patient Goals   Patient Goals : feel better; remain infection free for 1 year to have further surgery on left hip  Short Term Goals  Time Frame for Short Term Goals: 5-7 treatments/ week GOALS UPDATED 1/7/25  Short Term Goal 1: Pt to demonstrate an increased tolerance to theraputic activity for 30-40 minutes to promote an increase in functional endurance  Short Term Goal 2: Pt to demonstrate MOD I with bed mobility tasks for position change  Short Term Goal 3: Pt to demonstrate transfers supine <> sit w/ SBA x 1 for position change  Short Term Goal 4: Pt to demonstrate ability to sit EOB with CGA x1 for 20  minutes to engage the core muscles  Short Term Goal 5: Pt to amb 50' with SBA for safety on level surfaces.  Additional Goals?: Yes  Short Term Goal 6: Pt to propel w/c 50' on level surfaces MOD I.  Short Term Goal 7: Pt to demo good technique for HEP.      Plan  Physical Therapy Plan  General Plan: 5-7 times per week  Specific Instructions for Next Treatment:  transfers, gait, w/c mob, HEP  Current Treatment Recommendations: Strengthening, Balance training, Functional mobility training, Transfer training, Endurance training, Safety education & training, Patient/Caregiver education & training, Positioning, ROM, Gait training, Home exercise program, Equipment evaluation, education, & procurement, Therapeutic activities   Safety Devices  Type of Devices: All fall risk precautions in place, Call light within reach, Gait belt, Patient at risk for falls, Nurse notified, Chair alarm in place, Left in chair (PEBBLES Ring)  Restraints  Restraints Initially in Place: No       PT Individual Minutes  Time In: 1345  Time Out: 1415  Minutes: 30   Time Code Minutes  Timed Code Treatment Minutes: 30 Minutes       Electronically signed by Pearl Vega PT on 1/7/25 at 3:31 PM EST

## 2025-01-07 NOTE — CARE COORDINATION
DISCHARGE PLANNING NOTE:    Pt will be discharged home today. Informed Lisha from [facility] of this. [facility] will pull [facility]ONG and d/c med list from [facility].    Electronically signed by Helene Vernon RN on 1/7/2025 at 3:03 PM

## 2025-01-08 LAB
ALBUMIN SERPL-MCNC: 2.3 G/DL (ref 3.5–5.2)
ALP SERPL-CCNC: 220 U/L (ref 35–104)
ALT SERPL-CCNC: 46 U/L (ref 10–35)
ANION GAP SERPL CALCULATED.3IONS-SCNC: 8 MMOL/L (ref 9–16)
AST SERPL-CCNC: 48 U/L (ref 10–35)
BASOPHILS # BLD: 0.06 K/UL (ref 0–0.2)
BASOPHILS NFR BLD: 1 % (ref 0–2)
BILIRUB DIRECT SERPL-MCNC: <0.1 MG/DL (ref 0–0.3)
BILIRUB INDIRECT SERPL-MCNC: ABNORMAL MG/DL (ref 0–1)
BILIRUB SERPL-MCNC: <0.2 MG/DL (ref 0–1.2)
BUN SERPL-MCNC: 5 MG/DL (ref 6–20)
CALCIUM SERPL-MCNC: 8.1 MG/DL (ref 8.6–10.4)
CHLORIDE SERPL-SCNC: 109 MMOL/L (ref 98–107)
CO2 SERPL-SCNC: 22 MMOL/L (ref 20–31)
CREAT SERPL-MCNC: 0.5 MG/DL (ref 0.7–1.2)
EOSINOPHIL # BLD: 0.41 K/UL (ref 0–0.4)
EOSINOPHILS RELATIVE PERCENT: 7 % (ref 0–4)
ERYTHROCYTE [DISTWIDTH] IN BLOOD BY AUTOMATED COUNT: 20.1 % (ref 11.5–14.9)
GFR, ESTIMATED: >90 ML/MIN/1.73M2
GLUCOSE BLD-MCNC: 141 MG/DL (ref 65–105)
GLUCOSE BLD-MCNC: 185 MG/DL (ref 65–105)
GLUCOSE BLD-MCNC: 313 MG/DL (ref 65–105)
GLUCOSE BLD-MCNC: 381 MG/DL (ref 65–105)
GLUCOSE SERPL-MCNC: 368 MG/DL (ref 74–99)
HCT VFR BLD AUTO: 40.6 % (ref 36–46)
HGB BLD-MCNC: 12.3 G/DL (ref 12–16)
LIPASE SERPL-CCNC: 7 U/L (ref 13–60)
LYMPHOCYTES NFR BLD: 1.8 K/UL (ref 1–4.8)
LYMPHOCYTES RELATIVE PERCENT: 31 % (ref 24–44)
MCH RBC QN AUTO: 25.3 PG (ref 26–34)
MCHC RBC AUTO-ENTMCNC: 30.4 G/DL (ref 31–37)
MCV RBC AUTO: 83.3 FL (ref 80–100)
MONOCYTES NFR BLD: 0.52 K/UL (ref 0.1–1.3)
MONOCYTES NFR BLD: 9 % (ref 1–7)
MORPHOLOGY: ABNORMAL
NEUTROPHILS NFR BLD: 52 % (ref 36–66)
NEUTS SEG NFR BLD: 3.01 K/UL (ref 1.3–9.1)
PLATELET # BLD AUTO: 125 K/UL (ref 150–450)
PMV BLD AUTO: 9.8 FL (ref 6–12)
POTASSIUM SERPL-SCNC: 4.1 MMOL/L (ref 3.7–5.3)
PROT SERPL-MCNC: 5.1 G/DL (ref 6.6–8.7)
RBC # BLD AUTO: 4.87 M/UL (ref 4–5.2)
SODIUM SERPL-SCNC: 139 MMOL/L (ref 136–145)
WBC OTHER # BLD: 5.8 K/UL (ref 3.5–11)

## 2025-01-08 PROCEDURE — 83690 ASSAY OF LIPASE: CPT

## 2025-01-08 PROCEDURE — 6360000002 HC RX W HCPCS: Performed by: INTERNAL MEDICINE

## 2025-01-08 PROCEDURE — 80048 BASIC METABOLIC PNL TOTAL CA: CPT

## 2025-01-08 PROCEDURE — 2580000003 HC RX 258: Performed by: INTERNAL MEDICINE

## 2025-01-08 PROCEDURE — 2500000003 HC RX 250 WO HCPCS: Performed by: INTERNAL MEDICINE

## 2025-01-08 PROCEDURE — 6370000000 HC RX 637 (ALT 250 FOR IP): Performed by: INTERNAL MEDICINE

## 2025-01-08 PROCEDURE — 6370000000 HC RX 637 (ALT 250 FOR IP): Performed by: NURSE PRACTITIONER

## 2025-01-08 PROCEDURE — 80076 HEPATIC FUNCTION PANEL: CPT

## 2025-01-08 PROCEDURE — 82947 ASSAY GLUCOSE BLOOD QUANT: CPT

## 2025-01-08 PROCEDURE — 85025 COMPLETE CBC W/AUTO DIFF WBC: CPT

## 2025-01-08 PROCEDURE — 99233 SBSQ HOSP IP/OBS HIGH 50: CPT | Performed by: INTERNAL MEDICINE

## 2025-01-08 PROCEDURE — 36415 COLL VENOUS BLD VENIPUNCTURE: CPT

## 2025-01-08 PROCEDURE — 1200000000 HC SEMI PRIVATE

## 2025-01-08 RX ORDER — INSULIN GLARGINE 100 [IU]/ML
5 INJECTION, SOLUTION SUBCUTANEOUS DAILY
Status: DISCONTINUED | OUTPATIENT
Start: 2025-01-08 | End: 2025-01-09 | Stop reason: HOSPADM

## 2025-01-08 RX ORDER — METOCLOPRAMIDE 10 MG/1
10 TABLET ORAL
Status: DISCONTINUED | OUTPATIENT
Start: 2025-01-08 | End: 2025-01-09 | Stop reason: HOSPADM

## 2025-01-08 RX ADMIN — PROMETHAZINE HYDROCHLORIDE 25 MG: 25 TABLET ORAL at 07:46

## 2025-01-08 RX ADMIN — INSULIN GLARGINE 5 UNITS: 100 INJECTION, SOLUTION SUBCUTANEOUS at 11:11

## 2025-01-08 RX ADMIN — SODIUM CHLORIDE, PRESERVATIVE FREE 10 ML: 5 INJECTION INTRAVENOUS at 21:55

## 2025-01-08 RX ADMIN — DICYCLOMINE HYDROCHLORIDE 10 MG: 10 CAPSULE ORAL at 21:53

## 2025-01-08 RX ADMIN — METOCLOPRAMIDE 10 MG: 10 TABLET ORAL at 17:03

## 2025-01-08 RX ADMIN — FONDAPARINUX SODIUM 7.5 MG: 7.5 INJECTION SUBCUTANEOUS at 07:46

## 2025-01-08 RX ADMIN — BUPROPION HYDROCHLORIDE 150 MG: 150 TABLET, EXTENDED RELEASE ORAL at 07:45

## 2025-01-08 RX ADMIN — MORPHINE SULFATE 2 MG: 2 INJECTION, SOLUTION INTRAMUSCULAR; INTRAVENOUS at 06:26

## 2025-01-08 RX ADMIN — MORPHINE SULFATE 2 MG: 2 INJECTION, SOLUTION INTRAMUSCULAR; INTRAVENOUS at 11:08

## 2025-01-08 RX ADMIN — INSULIN LISPRO 4 UNITS: 100 INJECTION, SOLUTION INTRAVENOUS; SUBCUTANEOUS at 21:54

## 2025-01-08 RX ADMIN — HYDRALAZINE HYDROCHLORIDE 25 MG: 25 TABLET ORAL at 14:25

## 2025-01-08 RX ADMIN — SODIUM CHLORIDE: 9 INJECTION, SOLUTION INTRAVENOUS at 05:17

## 2025-01-08 RX ADMIN — MORPHINE SULFATE 2 MG: 2 INJECTION, SOLUTION INTRAMUSCULAR; INTRAVENOUS at 21:52

## 2025-01-08 RX ADMIN — MORPHINE SULFATE 2 MG: 2 INJECTION, SOLUTION INTRAMUSCULAR; INTRAVENOUS at 02:35

## 2025-01-08 RX ADMIN — DICYCLOMINE HYDROCHLORIDE 10 MG: 10 CAPSULE ORAL at 11:12

## 2025-01-08 RX ADMIN — MORPHINE SULFATE 2 MG: 2 INJECTION, SOLUTION INTRAMUSCULAR; INTRAVENOUS at 15:24

## 2025-01-08 RX ADMIN — DOXYCYCLINE 100 MG: 100 INJECTION, POWDER, LYOPHILIZED, FOR SOLUTION INTRAVENOUS at 11:17

## 2025-01-08 RX ADMIN — SUCRALFATE 1 G: 1 TABLET ORAL at 11:12

## 2025-01-08 RX ADMIN — DILTIAZEM HYDROCHLORIDE 120 MG: 120 CAPSULE, COATED, EXTENDED RELEASE ORAL at 07:45

## 2025-01-08 RX ADMIN — SUCRALFATE 1 G: 1 TABLET ORAL at 07:45

## 2025-01-08 RX ADMIN — PANTOPRAZOLE SODIUM 40 MG: 40 INJECTION, POWDER, LYOPHILIZED, FOR SOLUTION INTRAVENOUS at 05:19

## 2025-01-08 RX ADMIN — FLUOXETINE HYDROCHLORIDE 40 MG: 20 CAPSULE ORAL at 07:45

## 2025-01-08 RX ADMIN — METOCLOPRAMIDE 10 MG: 10 TABLET ORAL at 21:54

## 2025-01-08 RX ADMIN — DOXYCYCLINE 100 MG: 100 INJECTION, POWDER, LYOPHILIZED, FOR SOLUTION INTRAVENOUS at 23:22

## 2025-01-08 RX ADMIN — HYDRALAZINE HYDROCHLORIDE 25 MG: 25 TABLET ORAL at 06:11

## 2025-01-08 RX ADMIN — GABAPENTIN 300 MG: 300 CAPSULE ORAL at 07:45

## 2025-01-08 RX ADMIN — DICYCLOMINE HYDROCHLORIDE 10 MG: 10 CAPSULE ORAL at 17:02

## 2025-01-08 RX ADMIN — METOCLOPRAMIDE 10 MG: 10 TABLET ORAL at 11:12

## 2025-01-08 RX ADMIN — GABAPENTIN 300 MG: 300 CAPSULE ORAL at 14:25

## 2025-01-08 RX ADMIN — SUCRALFATE 1 G: 1 TABLET ORAL at 21:53

## 2025-01-08 RX ADMIN — INSULIN LISPRO 12 UNITS: 100 INJECTION, SOLUTION INTRAVENOUS; SUBCUTANEOUS at 17:03

## 2025-01-08 RX ADMIN — SUCRALFATE 1 G: 1 TABLET ORAL at 17:03

## 2025-01-08 RX ADMIN — DICYCLOMINE HYDROCHLORIDE 10 MG: 10 CAPSULE ORAL at 06:25

## 2025-01-08 RX ADMIN — HYDRALAZINE HYDROCHLORIDE 25 MG: 25 TABLET ORAL at 23:19

## 2025-01-08 RX ADMIN — INSULIN LISPRO 16 UNITS: 100 INJECTION, SOLUTION INTRAVENOUS; SUBCUTANEOUS at 07:46

## 2025-01-08 RX ADMIN — GABAPENTIN 300 MG: 300 CAPSULE ORAL at 21:54

## 2025-01-08 ASSESSMENT — ENCOUNTER SYMPTOMS
ABDOMINAL PAIN: 0
SHORTNESS OF BREATH: 0
DIARRHEA: 0
FACIAL SWELLING: 0
CHEST TIGHTNESS: 0
VOMITING: 1
EYE ITCHING: 0
EYE PAIN: 0
EYE REDNESS: 0
BACK PAIN: 0
COUGH: 0
CHOKING: 0
ABDOMINAL DISTENTION: 0
BLOOD IN STOOL: 0
NAUSEA: 1

## 2025-01-08 ASSESSMENT — PAIN DESCRIPTION - FREQUENCY: FREQUENCY: CONTINUOUS

## 2025-01-08 ASSESSMENT — PAIN - FUNCTIONAL ASSESSMENT: PAIN_FUNCTIONAL_ASSESSMENT: ACTIVITIES ARE NOT PREVENTED

## 2025-01-08 ASSESSMENT — PAIN DESCRIPTION - LOCATION
LOCATION: ABDOMEN
LOCATION: ABDOMEN;HIP
LOCATION: ABDOMEN;HIP;LEG
LOCATION: ABDOMEN;HIP
LOCATION: ABDOMEN
LOCATION: ABDOMEN

## 2025-01-08 ASSESSMENT — PAIN DESCRIPTION - DESCRIPTORS
DESCRIPTORS: BURNING
DESCRIPTORS: BURNING;SHARP

## 2025-01-08 ASSESSMENT — PAIN SCALES - GENERAL
PAINLEVEL_OUTOF10: 7
PAINLEVEL_OUTOF10: 4
PAINLEVEL_OUTOF10: 7
PAINLEVEL_OUTOF10: 4
PAINLEVEL_OUTOF10: 8
PAINLEVEL_OUTOF10: 0
PAINLEVEL_OUTOF10: 6
PAINLEVEL_OUTOF10: 7

## 2025-01-08 ASSESSMENT — PAIN DESCRIPTION - PAIN TYPE: TYPE: CHRONIC PAIN

## 2025-01-08 ASSESSMENT — PAIN DESCRIPTION - ORIENTATION
ORIENTATION: RIGHT
ORIENTATION: LEFT
ORIENTATION: LEFT
ORIENTATION: RIGHT

## 2025-01-08 ASSESSMENT — PAIN DESCRIPTION - ONSET: ONSET: ON-GOING

## 2025-01-08 NOTE — PLAN OF CARE
Problem: Chronic Conditions and Co-morbidities  Goal: Patient's chronic conditions and co-morbidity symptoms are monitored and maintained or improved  1/8/2025 0105 by Anne-Marie Gonzales RN  Outcome: Progressing  Flowsheets (Taken 1/6/2025 1945 by Leola Luciano, RN)  Care Plan - Patient's Chronic Conditions and Co-Morbidity Symptoms are Monitored and Maintained or Improved: Monitor and assess patient's chronic conditions and comorbid symptoms for stability, deterioration, or improvement  1/7/2025 1546 by Fabienne Uribe RN  Outcome: Progressing     Problem: Discharge Planning  Goal: Discharge to home or other facility with appropriate resources  1/8/2025 0105 by Anne-Marie Gonzales RN  Outcome: Progressing  Flowsheets (Taken 1/6/2025 1945 by Leola Luciano, RN)  Discharge to home or other facility with appropriate resources: Identify barriers to discharge with patient and caregiver  1/7/2025 1546 by Fabienne Uribe RN  Outcome: Progressing     Problem: Skin/Tissue Integrity  Goal: Absence of new skin breakdown  Description: 1.  Monitor for areas of redness and/or skin breakdown  2.  Assess vascular access sites hourly  3.  Every 4-6 hours minimum:  Change oxygen saturation probe site  4.  Every 4-6 hours:  If on nasal continuous positive airway pressure, respiratory therapy assess nares and determine need for appliance change or resting period.  1/8/2025 0105 by Anne-Marie Gonzales RN  Outcome: Progressing  Note: Skin kept clean and dry, assessed per protocol.  1/7/2025 1546 by Fabienne Uribe RN  Outcome: Progressing     Problem: Safety - Adult  Goal: Free from fall injury  1/8/2025 0105 by Anne-Marie Gonzales RN  Outcome: Progressing  Flowsheets (Taken 1/5/2025 1237 by Jesus Gates RN)  Free From Fall Injury: Instruct family/caregiver on patient safety  1/7/2025 1546 by Fabienne Uribe RN  Outcome: Progressing     Problem: Pain  Goal: Verbalizes/displays adequate comfort level or baseline comfort level  1/8/2025 0105 by Christian

## 2025-01-08 NOTE — PLAN OF CARE
Problem: Chronic Conditions and Co-morbidities  Goal: Patient's chronic conditions and co-morbidity symptoms are monitored and maintained or improved  1/8/2025 1437 by Fabienne Uribe RN  Outcome: Progressing  1/8/2025 0105 by Anne-Marie Gonzales RN  Outcome: Progressing  Flowsheets (Taken 1/6/2025 1945 by Leola Luciano, RN)  Care Plan - Patient's Chronic Conditions and Co-Morbidity Symptoms are Monitored and Maintained or Improved: Monitor and assess patient's chronic conditions and comorbid symptoms for stability, deterioration, or improvement     Problem: Discharge Planning  Goal: Discharge to home or other facility with appropriate resources  1/8/2025 1437 by Fabienne Uribe RN  Outcome: Progressing  1/8/2025 0105 by Anne-Marie Gonzales RN  Outcome: Progressing  Flowsheets (Taken 1/6/2025 1945 by Leola Luciano, RN)  Discharge to home or other facility with appropriate resources: Identify barriers to discharge with patient and caregiver     Problem: Skin/Tissue Integrity  Goal: Absence of new skin breakdown  1/8/2025 1437 by Fabienne Uribe RN  Outcome: Progressing  1/8/2025 0105 by Anne-Marie Gonzales RN  Outcome: Progressing  Note: Skin kept clean and dry, assessed per protocol.     Problem: Safety - Adult  Goal: Free from fall injury  1/8/2025 1437 by Fabienne Uribe RN  Outcome: Progressing  1/8/2025 0105 by Anne-Marie Gonzales RN  Outcome: Progressing  Flowsheets (Taken 1/5/2025 1237 by Jesus Gates, RN)  Free From Fall Injury: Instruct family/caregiver on patient safety     Problem: Pain  Goal: Verbalizes/displays adequate comfort level or baseline comfort level  1/8/2025 1437 by Fabienne Uribe RN  Outcome: Progressing  1/8/2025 0105 by Anne-Marie Gonzales RN  Outcome: Progressing  Flowsheets (Taken 1/8/2025 0105)  Verbalizes/displays adequate comfort level or baseline comfort level:   Encourage patient to monitor pain and request assistance   Assess pain using appropriate pain scale   Administer analgesics based on type and  severity of pain and evaluate response   Implement non-pharmacological measures as appropriate and evaluate response     Problem: ABCDS Injury Assessment  Goal: Absence of physical injury  1/8/2025 1437 by Fabienne Uribe, RN  Outcome: Progressing  1/8/2025 0105 by Anne-Marie Gonzales, RN  Outcome: Progressing  Flowsheets (Taken 1/5/2025 1237 by Jesus Gates, RN)  Absence of Physical Injury: Implement safety measures based on patient assessment

## 2025-01-08 NOTE — PROGRESS NOTES
Dr. Estrada notified that patients Accheck was 381 16 units of insulin given with no s/s of distress. She also had small amount of emesis after eating her eggs and phenergan given.

## 2025-01-08 NOTE — CARE COORDINATION
ONGOING DISCHARGE PLAN:    Patient is alert and oriented x4.    Spoke with patient regarding discharge plan and patient confirms that plan is still home with [facility].    Pt states she is still having nausea/vomiting and abdominal pain.    Per primary notes, discharge when cleared by GI.    Active order for IV Doxy. Can d/c on PO Doxy.    Will continue to follow for additional discharge needs.    If patient is discharged prior to next notation, then this note serves as note for discharge by case management.    Electronically signed by Helene Vernon RN on 1/8/2025 at 11:33 AM

## 2025-01-08 NOTE — PROGRESS NOTES
[facility] -   Infectious diseases evaluation  admission date 1/2/2025    reason for consultation:   Left hip wound infection, history of ESBL and VRE    Impression :   Current:  Anemia suspected GI bleed, followed by GI  Elevated liver enzymes, hepatitis panel and CMV serology negative  Left hip wound no signs of wound infection currently/Intramedullary bone marrow edema extending from the proximal into the mid left femoral diaphysis nonspecific seen on MRI 12/16/2024 questionable chronic osteomyelitis  S/P left hip hemiarthroplasty performed by Dr. Traore on 7/19/2023 after sustaining a femoral neck fracture.    S/P left hip I&D /placement of antibiotic cement beads on 8/21/2023 due to infection post hemiarthroplasty.    S/P left hip [facility] at [facility].    DM  Alcohol abuse   Antiphospholipid syndrome   H/O bariatric surgery   H/O PE/DVT status post IVC filter  H/O MRSA/MDRO/VRE/Acinetobacter      HENCE:     IV doxycycline for now due to vomiting, may change to oral on discharge    Follow-up in 2 weeks .    Infection Control Recommendations   [place] Precautions  Contact Isolation       Antimicrobial Stewardship Recommendations   Simplification of therapy  Targeted therapy      History of Present Illness:   Initial history:  Krista Chiang is a 44 y.o.-year-old female presenting to hospital with severe nausea, vomiting for 3 weeks associated with epigastric pain, rectal bleeding and hematemesis.  She had a chronic left hip wound with no bone exposure, no necrotic tissue or purulent drainage, no surrounding redness.  Left hip MRI 12/16/2024  IMPRESSION:  1. Exam limited by artifact  2. Large lateral left hip wound with linear edema communicating around the  posterior margin of the proximal femur to the left hip joint.  No discrete  drainable abscess or fluid collection.  3. Intramedullary bone marrow edema extending from the proximal into the mid  left femoral  AC & HS    dilTIAZem  120 mg Oral Daily    FLUoxetine  40 mg Oral Daily    gabapentin  300 mg Oral TID    sodium chloride flush  5-40 mL IntraVENous 2 times per day    scopolamine  1 patch TransDERmal Q72H    pantoprazole (PROTONIX) 40 mg in sodium chloride (PF) 0.9 % 10 mL injection  40 mg IntraVENous Q12H       Social History:     Social History     Socioeconomic History    Marital status:      Spouse name: Jeremías Chiang    Number of children: 1    Years of education: 3 years of college    Highest education level: Not on file   Occupational History    Occupation: infant care     Comment: last worked 2008   Tobacco Use    Smoking status: Never    Smokeless tobacco: Never   Vaping Use    Vaping status: Never Used   Substance and Sexual Activity    Alcohol use: Not Currently    Drug use: No    Sexual activity: Not Currently     Partners: Male   Other Topics Concern    Not on file   Social History Narrative    Lives with Jeremías ex  and daughter          Social Determinants of Health     Financial Resource Strain: Medium Risk (7/8/2024)    Received from [facility]    Overall Financial Resource Strain (CARDIA)     Difficulty of Paying Living Expenses: Somewhat hard   Food Insecurity: No Food Insecurity (1/2/2025)    Hunger Vital Sign     Worried About Running Out of Food in the Last Year: Never true     Ran Out of Food in the Last Year: Never true   Transportation Needs: Unmet Transportation Needs (1/2/2025)    PRAPARE - Transportation     Lack of Transportation (Medical): Yes     Lack of Transportation (Non-Medical): Yes   Physical Activity: Insufficiently Active (4/15/2021)    [facility]    Exercise Vital Sign     Days of Exercise per Week: 2 days     Minutes of Exercise per Session: 50 min   Stress: No Stress Concern Present (8/15/2024)    Received from [facility] Occupational [facility] - Occupational Stress Questionnaire

## 2025-01-08 NOTE — PROGRESS NOTES
[facility]  IN-PATIENT SERVICE  [facility]  IN-PATIENT SERVICE   [facility]     Progress Note            Date:   1/8/2025  Patient name:  Krista Chiang  Date of admission:  1/2/2025 11:24 AM  MRN:   [id number]  Account:  [id number]  YOB: 1980  PCP:    Arielle Melton APRN - NP  Room:   2045/2045-01  Code Status:    Full Code    Chief Complaint:     Chief Complaint   Patient presents with    Hematemesis    Rectal Bleeding    Wound Check     Very painful (does not think infection is improving)       History Obtained From:     patient    History of Present Illness:     The patient is a 44 y.o.  Non- / non  female who presents withHematemesis, Rectal Bleeding, and Wound Check (Very painful (does not think infection is improving))   and she is admitted to the hospital for the management of  Upper GI Bleed and epigastric pain.     She has a past medical history of morbid obesity s/p Rux en Y surgery in 2013, diabetes mellitus(on insulin), left hip arthroplasty (complicated with infection, hardware removed, got infected, on doxycycline until 1/16/2025 for Klebsiella infection, previous infection of MRSA, and VRE), s/p left fractured tibia and fibula, anxiety and depression(on Wellbutrin and Prozac), SVT(on Cardizem), frostbite (amputation of second and fourth fingers on left hand), alcohol abuse, DVT s/p IVC filter, antiphospholipid syndrome (on fondaparinux), fibromyalgia, hepatic adenoma, hyperlipidemia, hypertension, and skin carcinoma.  As per patient, bariatric surgery was complicated with ventral incisional hernia, required laparoscopic adhesions lysis.     He was discharged from [facility] 3 weeks ago.  At that time endoscopy was done, which showed 2 large ulcer at the anastomosis.  Biopsies were taken.    Today she came in with a complaint of severe nausea, vomiting for the  medical history with Awa-en-Y in 2013, complicated left hip arthroplasty last year with Klebsiella/VRE/CRE infections, and antiphospholipid syndrome on found to fondaparinux.  She was recently discharged from [facility] 3 weeks ago where she had an EGD that showed 2 large ulcers at the site of anastomosis. Patient came in stating that she has been having persistent nausea, vomiting with streaks of blood in her vomit since the EGD.  Her hemoglobin has been stable, 12.7 on admission, and today is 11.4.  GI has been consulted, unclear when EGD will be done.  ID also consulted for left hip wound, empirically on Doxy as per previous admissions recommendation.  Also had prolonged QTc on most recent EKG, avoiding Zofran for her nausea/vomiting.  Liver ultrasound ordered due to rising LFTs.     Patient seen and examined at bedside.  Still having nausea and vomiting, with some spots of blood as per patient.  Hemoglobin has been stable.  Continue CBC daily.  No dark-colored stools.  Liver ultrasound is unremarkable.  EGD today as per GI.  Patient was compliant with her medications.  Hypokalemia.  Repeat BMP today.  Magnesium 1.9.    1/5.  Patient seen and examined at bedside.  GI on board, recommending conservative management for now.  Patient still having nausea and 1 episode of vomiting last night.  Just able to tolerate few pieces of cottage cheese.  Liver ultrasound is unremarkable.  IV Doxy for complicated hip arthroplasty infection.  Needs to follow-up at [facility] orthopedic surgery.  No obvious bleeding noted.  Hemoglobin has been stable, labs are pending for today.      1/6  Patient seen and examined  Patient intermittently hypertensive add hydralazine  On IV doxycycline as per ID  Continues to have abdominal pain, currently n.p.o. GI planning EGD, hemoglobin is stable, resume fondaparinux after the procedure next  No chest pain shortness of breath    1/7  Patient seen and examined  Her vitals have been stable  Patient

## 2025-01-09 VITALS
HEIGHT: 68 IN | WEIGHT: 210 LBS | TEMPERATURE: 97.8 F | DIASTOLIC BLOOD PRESSURE: 79 MMHG | OXYGEN SATURATION: 100 % | RESPIRATION RATE: 16 BRPM | BODY MASS INDEX: 31.83 KG/M2 | HEART RATE: 90 BPM | SYSTOLIC BLOOD PRESSURE: 132 MMHG

## 2025-01-09 LAB
ANION GAP SERPL CALCULATED.3IONS-SCNC: 10 MMOL/L (ref 9–16)
BASOPHILS # BLD: 0.05 K/UL (ref 0–0.2)
BASOPHILS NFR BLD: 1 % (ref 0–2)
BUN SERPL-MCNC: 5 MG/DL (ref 6–20)
CALCIUM SERPL-MCNC: 7.8 MG/DL (ref 8.6–10.4)
CHLORIDE SERPL-SCNC: 110 MMOL/L (ref 98–107)
CO2 SERPL-SCNC: 20 MMOL/L (ref 20–31)
CREAT SERPL-MCNC: 0.5 MG/DL (ref 0.7–1.2)
EOSINOPHIL # BLD: 0.26 K/UL (ref 0–0.4)
EOSINOPHILS RELATIVE PERCENT: 5 % (ref 0–4)
ERYTHROCYTE [DISTWIDTH] IN BLOOD BY AUTOMATED COUNT: 19 % (ref 11.5–14.9)
GFR, ESTIMATED: >90 ML/MIN/1.73M2
GLUCOSE BLD-MCNC: 139 MG/DL (ref 65–105)
GLUCOSE BLD-MCNC: 215 MG/DL (ref 65–105)
GLUCOSE SERPL-MCNC: 158 MG/DL (ref 74–99)
HCT VFR BLD AUTO: 40.9 % (ref 36–46)
HGB BLD-MCNC: 12.8 G/DL (ref 12–16)
LYMPHOCYTES NFR BLD: 1.51 K/UL (ref 1–4.8)
LYMPHOCYTES RELATIVE PERCENT: 29 % (ref 24–44)
MCH RBC QN AUTO: 25.1 PG (ref 26–34)
MCHC RBC AUTO-ENTMCNC: 31.3 G/DL (ref 31–37)
MCV RBC AUTO: 80.2 FL (ref 80–100)
MONOCYTES NFR BLD: 0.36 K/UL (ref 0.1–1.3)
MONOCYTES NFR BLD: 7 % (ref 1–7)
MORPHOLOGY: ABNORMAL
NEUTROPHILS NFR BLD: 58 % (ref 36–66)
NEUTS SEG NFR BLD: 3.02 K/UL (ref 1.3–9.1)
PLATELET # BLD AUTO: 204 K/UL (ref 150–450)
PMV BLD AUTO: 9.7 FL (ref 6–12)
POTASSIUM SERPL-SCNC: 4.2 MMOL/L (ref 3.7–5.3)
RBC # BLD AUTO: 5.1 M/UL (ref 4–5.2)
SODIUM SERPL-SCNC: 140 MMOL/L (ref 136–145)
SURGICAL PATHOLOGY REPORT: NORMAL
WBC OTHER # BLD: 5.2 K/UL (ref 3.5–11)

## 2025-01-09 PROCEDURE — 6370000000 HC RX 637 (ALT 250 FOR IP): Performed by: NURSE PRACTITIONER

## 2025-01-09 PROCEDURE — 99239 HOSP IP/OBS DSCHRG MGMT >30: CPT | Performed by: INTERNAL MEDICINE

## 2025-01-09 PROCEDURE — 2580000003 HC RX 258: Performed by: INTERNAL MEDICINE

## 2025-01-09 PROCEDURE — 82947 ASSAY GLUCOSE BLOOD QUANT: CPT

## 2025-01-09 PROCEDURE — 6370000000 HC RX 637 (ALT 250 FOR IP): Performed by: INTERNAL MEDICINE

## 2025-01-09 PROCEDURE — 36415 COLL VENOUS BLD VENIPUNCTURE: CPT

## 2025-01-09 PROCEDURE — 80048 BASIC METABOLIC PNL TOTAL CA: CPT

## 2025-01-09 PROCEDURE — 6360000002 HC RX W HCPCS: Performed by: INTERNAL MEDICINE

## 2025-01-09 PROCEDURE — 85025 COMPLETE CBC W/AUTO DIFF WBC: CPT

## 2025-01-09 PROCEDURE — 2500000003 HC RX 250 WO HCPCS: Performed by: INTERNAL MEDICINE

## 2025-01-09 RX ORDER — OXYCODONE AND ACETAMINOPHEN 5; 325 MG/1; MG/1
1 TABLET ORAL EVERY 12 HOURS PRN
Qty: 5 TABLET | Refills: 0 | Status: SHIPPED | OUTPATIENT
Start: 2025-01-09 | End: 2025-01-12

## 2025-01-09 RX ORDER — OXYCODONE AND ACETAMINOPHEN 5; 325 MG/1; MG/1
1 TABLET ORAL EVERY 4 HOURS PRN
Status: DISCONTINUED | OUTPATIENT
Start: 2025-01-09 | End: 2025-01-09 | Stop reason: HOSPADM

## 2025-01-09 RX ORDER — DOXYCYCLINE 100 MG/1
100 CAPSULE ORAL EVERY 12 HOURS SCHEDULED
Status: DISCONTINUED | OUTPATIENT
Start: 2025-01-09 | End: 2025-01-09 | Stop reason: HOSPADM

## 2025-01-09 RX ADMIN — MORPHINE SULFATE 2 MG: 2 INJECTION, SOLUTION INTRAMUSCULAR; INTRAVENOUS at 02:13

## 2025-01-09 RX ADMIN — INSULIN GLARGINE 5 UNITS: 100 INJECTION, SOLUTION SUBCUTANEOUS at 09:57

## 2025-01-09 RX ADMIN — DOXYCYCLINE 100 MG: 100 CAPSULE ORAL at 11:06

## 2025-01-09 RX ADMIN — DICYCLOMINE HYDROCHLORIDE 10 MG: 10 CAPSULE ORAL at 06:01

## 2025-01-09 RX ADMIN — FLUOXETINE HYDROCHLORIDE 40 MG: 20 CAPSULE ORAL at 09:57

## 2025-01-09 RX ADMIN — FONDAPARINUX SODIUM 7.5 MG: 7.5 INJECTION SUBCUTANEOUS at 10:06

## 2025-01-09 RX ADMIN — DICYCLOMINE HYDROCHLORIDE 10 MG: 10 CAPSULE ORAL at 11:06

## 2025-01-09 RX ADMIN — PANTOPRAZOLE SODIUM 40 MG: 40 INJECTION, POWDER, LYOPHILIZED, FOR SOLUTION INTRAVENOUS at 06:01

## 2025-01-09 RX ADMIN — METOCLOPRAMIDE 10 MG: 10 TABLET ORAL at 06:00

## 2025-01-09 RX ADMIN — SODIUM CHLORIDE, PRESERVATIVE FREE 10 ML: 5 INJECTION INTRAVENOUS at 10:00

## 2025-01-09 RX ADMIN — BUPROPION HYDROCHLORIDE 150 MG: 150 TABLET, EXTENDED RELEASE ORAL at 09:57

## 2025-01-09 RX ADMIN — OXYCODONE HYDROCHLORIDE AND ACETAMINOPHEN 1 TABLET: 5; 325 TABLET ORAL at 11:06

## 2025-01-09 RX ADMIN — GABAPENTIN 300 MG: 300 CAPSULE ORAL at 09:57

## 2025-01-09 RX ADMIN — METOCLOPRAMIDE 10 MG: 10 TABLET ORAL at 11:35

## 2025-01-09 RX ADMIN — INSULIN LISPRO 4 UNITS: 100 INJECTION, SOLUTION INTRAVENOUS; SUBCUTANEOUS at 11:35

## 2025-01-09 RX ADMIN — DILTIAZEM HYDROCHLORIDE 120 MG: 120 CAPSULE, COATED, EXTENDED RELEASE ORAL at 09:57

## 2025-01-09 RX ADMIN — HYDRALAZINE HYDROCHLORIDE 25 MG: 25 TABLET ORAL at 06:00

## 2025-01-09 RX ADMIN — OXYCODONE HYDROCHLORIDE AND ACETAMINOPHEN 1 TABLET: 5; 325 TABLET ORAL at 06:21

## 2025-01-09 RX ADMIN — SUCRALFATE 1 G: 1 TABLET ORAL at 09:57

## 2025-01-09 ASSESSMENT — PAIN DESCRIPTION - ORIENTATION
ORIENTATION: RIGHT;MID
ORIENTATION: LEFT

## 2025-01-09 ASSESSMENT — PAIN DESCRIPTION - DESCRIPTORS
DESCRIPTORS: BURNING
DESCRIPTORS: ACHING
DESCRIPTORS: BURNING

## 2025-01-09 ASSESSMENT — PAIN DESCRIPTION - LOCATION
LOCATION: BACK;HIP
LOCATION: ABDOMEN;HIP;LEG
LOCATION: ABDOMEN;LEG

## 2025-01-09 ASSESSMENT — PAIN SCALES - GENERAL
PAINLEVEL_OUTOF10: 4
PAINLEVEL_OUTOF10: 7
PAINLEVEL_OUTOF10: 6
PAINLEVEL_OUTOF10: 4
PAINLEVEL_OUTOF10: 7

## 2025-01-09 NOTE — PROGRESS NOTES
Meds to beds contacted for patient medication. Patient presented with discharge instructions. Writer educated patient on opioid use and not operating machinery or driving as well as completing antibiotics. Questions acknowledged. IV removed without complications. All belongings packed and on person. Patient waiting for her transportation at this time.

## 2025-01-09 NOTE — CARE COORDINATION
ONGOING DISCHARGE PLAN:    Patient is alert and oriented x4.    Spoke with patient regarding discharge plan and patient confirms that plan is still home with [facility] - [facility]. Informed Lisha from [facility] that pt will d/c today.  [facility] will pull [facility] and d/c med list from [facility]. Pt will d/c home on PO Doxy.    Will continue to follow for additional discharge needs.    If patient is discharged prior to next notation, then this note serves as note for discharge by case management.    Electronically signed by Helene Vernon RN on 1/9/2025 at 11:06 AM

## 2025-01-09 NOTE — DISCHARGE SUMMARY
IN-PATIENT SERVICE   [facility] Internal Medicine    Discharge Summary     Patient ID: Krista Chiang  :  1980   MRN: [id number]     ACCOUNT:  [id number]   Patient's PCP: Arielle Melton APRN - NP  Admit Date: 2025   Discharge Date: 2025    Length of Stay: 7  Code Status:  Full Code  Admitting Physician: Crystal Estrada MD  Discharge Physician: Crystal Estrada MD     Active Discharge Diagnoses:     Primary Problem  Abdominal pain      Hospital Problems  Active Hospital Problems    Diagnosis Date Noted    Nausea and vomiting [R11.2] 2025    Abdominal pain [R10.9] 2025    Black stool [K92.1] 2024    Open wnd hip/thigh-complicated, left, subsequent encounter [S71.002D, S71.102D] 2024    Elevated LFTs [R79.89] 2023    Type 2 diabetes mellitus with diabetic polyneuropathy, with long-term current use of insulin (HCC) [E11.42, Z79.4] 2015       Admission Condition:  fair     Discharged Condition: fair    Hospital Stay:     Hospital Course:  Krista Chiang is a 44 y.o. female who was admitted for the management of Abdominal pain , presented to ER with Hematemesis, Rectal Bleeding, and Wound Check (Very painful (does not think infection is improving))      The patient is a 44 y.o.  Non- / non  female who presents withHematemesis, Rectal Bleeding, and Wound Check (Very painful (does not think infection is improving))   and she is admitted to the hospital for the management of  Upper GI Bleed and epigastric pain.      She has a past medical history of morbid obesity s/p Rux en Y surgery in , diabetes mellitus(on insulin), left hip arthroplasty (complicated with infection, hardware removed, got infected, on doxycycline until 2025 for Klebsiella infection, previous infection of MRSA, and VRE), s/p left fractured tibia and fibula, anxiety and depression(on Wellbutrin and Prozac), SVT(on Cardizem), frostbite (amputation of second and

## 2025-01-09 NOTE — PROGRESS NOTES
CLINICAL PHARMACY NOTE: MEDS TO BEDS    Total # of Prescriptions Filled: 1   The following medications were delivered to the patient:  Oxycodone/APAP 5-325MG Tablets   Additional Documentation:  Signed for by Ameesha at 11:33AM 1/9/25

## 2025-01-09 NOTE — PLAN OF CARE
Problem: Chronic Conditions and Co-morbidities  Goal: Patient's chronic conditions and co-morbidity symptoms are monitored and maintained or improved  1/9/2025 0126 by Terry Chadwick RN  Outcome: Progressing  Flowsheets (Taken 1/8/2025 2200)  Care Plan - Patient's Chronic Conditions and Co-Morbidity Symptoms are Monitored and Maintained or Improved: Monitor and assess patient's chronic conditions and comorbid symptoms for stability, deterioration, or improvement  1/8/2025 1437 by Fabienne Uribe RN  Outcome: Progressing     Problem: Discharge Planning  Goal: Discharge to home or other facility with appropriate resources  1/9/2025 0126 by Terry Chadwick RN  Outcome: Progressing  Flowsheets (Taken 1/8/2025 2200)  Discharge to home or other facility with appropriate resources:   Identify barriers to discharge with patient and caregiver   Identify discharge learning needs (meds, wound care, etc)  1/8/2025 1437 by Fabienne Uribe RN  Outcome: Progressing     Problem: Skin/Tissue Integrity  Goal: Absence of new skin breakdown  Description: 1.  Monitor for areas of redness and/or skin breakdown  2.  Assess vascular access sites hourly  3.  Every 4-6 hours minimum:  Change oxygen saturation probe site  4.  Every 4-6 hours:  If on nasal continuous positive airway pressure, respiratory therapy assess nares and determine need for appliance change or resting period.  1/9/2025 0126 by Terry Chadwick RN  Outcome: Progressing  1/8/2025 1437 by Fabienne Uribe RN  Outcome: Progressing     Problem: Safety - Adult  Goal: Free from fall injury  1/9/2025 0126 by Terry Chadwick RN  Outcome: Progressing  Flowsheets (Taken 1/9/2025 0124)  Free From Fall Injury: Instruct family/caregiver on patient safety  1/8/2025 1437 by Fabienne Uribe RN  Outcome: Progressing     Problem: Pain  Goal: Verbalizes/displays adequate comfort level or baseline comfort level  1/9/2025 0126 by Terry Chadwick RN  Outcome: Progressing  Flowsheets (Taken 1/8/2025  2222)  Verbalizes/displays adequate comfort level or baseline comfort level:   Encourage patient to monitor pain and request assistance   Administer analgesics based on type and severity of pain and evaluate response  1/8/2025 1437 by Fabienne Uribe, RN  Outcome: Progressing     Problem: ABCDS Injury Assessment  Goal: Absence of physical injury  1/9/2025 0126 by Terry Chadwick RN  Outcome: Progressing  Flowsheets (Taken 1/9/2025 0124)  Absence of Physical Injury: Implement safety measures based on patient assessment  1/8/2025 1437 by Fabienne Uribe, RN  Outcome: Progressing

## 2025-01-09 NOTE — PROGRESS NOTES
Patient given pain meds from pharmacy. Carafate sent to [facility] on [place]. Patient wheeled down to her vehicle by staff via wheelchair.

## 2025-01-09 NOTE — PROGRESS NOTES
Lenora sent to NP to make team aware of the patient having a BM with a large, orange grease ring in the toilet. Patient states this is a new occurrence over the last approximately 2 weeks. Stool was soft, tan-colored, and contained pieces of food. NP reviewed patient's chart. Patient to follow up with GI as an outpatient. No new orders at this time.

## 2025-01-10 ENCOUNTER — CARE COORDINATION (OUTPATIENT)
Dept: CARE COORDINATION | Age: 45
End: 2025-01-10

## 2025-01-10 NOTE — CARE COORDINATION
Care Transitions Note    Initial Call - Call within 2 business days of discharge: Yes    Attempted to reach patient for transitions of care follow up. Unable to reach patient. First attempt, MB full.    Outreach Attempts:   Unable to leave message.     Patient: Krista Chiang    Patient : 1980   MRN: [id number]    Reason for Admission: nausea and vomiting  Discharge Date: 25  RURS: Readmission Risk Score: 26.4    Last Discharge Facility       Date Complaint Diagnosis Description Type Department Provider    25 Hematemesis; Rectal Bleeding; Wound Check Nausea and vomiting, unspecified vomiting type ... ED to Hosp-Admission (Discharged) (ADMITTED) [facility] Carli Szymanski MD; Porfirio...            Was this an external facility discharge? No    Follow Up Appointment:   Patient does not have a follow up appointment scheduled at time of call.  [facility]      Plan for follow-up on next business day.      Joslyn Farris RN

## 2025-01-13 ENCOUNTER — CARE COORDINATION (OUTPATIENT)
Dept: CARE COORDINATION | Age: 45
End: 2025-01-13

## 2025-01-13 RX ORDER — ONDANSETRON 8 MG/1
8 TABLET, ORALLY DISINTEGRATING ORAL
COMMUNITY
Start: 2024-12-09

## 2025-01-13 NOTE — CARE COORDINATION
Care Transitions Note    Initial Call - Call within 2 business days of discharge: Yes    Patient Current Location:  Home: [address] [place] [address] [address]  [facility] [address]    Care Transition Nurse contacted the patient by telephone to perform post hospital discharge assessment, verified name and  as identifiers. Provided introduction to self, and explanation of the Care Transition Nurse role.     Patient: Krista Chiang    Patient : 1980   MRN: [id number]    Reason for Admission: nausea and vomiting  Discharge Date: 25  RURS: Readmission Risk Score: 26.4      Last Discharge Facility       Date Complaint Diagnosis Description Type Department Provider    25 Hematemesis; Rectal Bleeding; Wound Check Nausea and vomiting, unspecified vomiting type ... ED to Hosp-Admission (Discharged) (ADMITTED) [facility] Carli Rizo MD; Porfirio...            Was this an external facility discharge? No    Additional needs identified to be addressed with provider   No needs identified             Method of communication with provider: none.    Patients top risk factors for readmission: medical condition-wounds, antiphospholipid syndrome    Interventions to address risk factors:   Education: when to call MD, return to ED, fluids, protein for wound healing  Review of patient management of conditions/medications: symptoms, HFU appt, medications  Home Health: [facility]     Care Summary Note: Patient reached for PAOLA call. Confirms AVS and new percocet and Carafate. States continues with some nausea and abdominal pain. Reports percocet does help pain. Patient discharged home with home care after inpatient stay for hematemesis, rectal bleeding, abdominal pain, wound check. Recently treated for wound infection, doxycycline continues. She is able to keep some solids and liquids down. Denies bleeding. Reviewed risk for dehydration, monitor for bleeding, intractable nausea/ vomiting. She has phenergan and zofran in

## 2025-01-21 ENCOUNTER — CARE COORDINATION (OUTPATIENT)
Dept: CARE COORDINATION | Age: 45
End: 2025-01-21

## 2025-01-21 RX ORDER — BUMETANIDE 1 MG/1
2 TABLET ORAL PRN
COMMUNITY

## 2025-01-21 NOTE — CARE COORDINATION
Care Transitions Note    Follow Up Call     Patient Current Location:  Home: [address]  [place] [address]    Care Transition Nurse contacted the patient by telephone. Verified name and  as identifiers.    Additional needs identified to be addressed with provider   No needs identified                 Method of communication with provider: none.    Care Summary Note: Patient reached for follow up. Reviewed [facility] follow up with PCP patient has had 2 falls in home. Patient denies injury outside bruising and laceration managed by [facility] and wound care. Relays mobility issues due to removal of hip hardware due to infection, she has uneven gait. She will follow up with [facility] orthopedics next week and [facility] orthopedics in 4 week. She will be fitted for orthopedic shoe on Monday that will assist to even gain. Physical therapy is currently in home. She has completed PCP HFU, new bumex started PRN for swelling. Infectious disease appt  and wound care scheduled patient does not recall day. Patient continues with baseline nausea and vomiting. Communicates she manages with complex medical conditions and is able to function normally despite these. Discussed her priority is getting hip fixed. GI appt not yet scheduled, will discuss at next call. Agrees to RD consult due to persistent N/V.     Plan of care updates since last contact:  Review of patient management of conditions/medications:       Advance Care Planning:   Does patient have an Advance Directive: patient declined education.    Medication Review:  Medications changed since last call, reviewed today.     Remote Patient Monitoring:  Offered patient enrollment in the Remote Patient Monitoring (RPM) program for in-home monitoring: Patient is not eligible for RPM program because: affiliate provider.    Assessments:  Care Transitions Subsequent and Final Call    Schedule Follow Up Appointment with PCP: Completed  Subsequent and Final Calls  Do you have any

## 2025-01-22 ENCOUNTER — CARE COORDINATION (OUTPATIENT)
Dept: CARE COORDINATION | Age: 45
End: 2025-01-22

## 2025-01-22 ENCOUNTER — OFFICE VISIT (OUTPATIENT)
Dept: INFECTIOUS DISEASES | Age: 45
End: 2025-01-22
Payer: COMMERCIAL

## 2025-01-22 VITALS
OXYGEN SATURATION: 96 % | BODY MASS INDEX: 31.93 KG/M2 | HEART RATE: 111 BPM | RESPIRATION RATE: 22 BRPM | WEIGHT: 210 LBS | DIASTOLIC BLOOD PRESSURE: 73 MMHG | SYSTOLIC BLOOD PRESSURE: 116 MMHG | TEMPERATURE: 98.1 F

## 2025-01-22 DIAGNOSIS — T81.49XA SURGICAL WOUND INFECTION: Primary | Chronic | ICD-10-CM

## 2025-01-22 DIAGNOSIS — M86.60 CHRONIC OSTEOMYELITIS: ICD-10-CM

## 2025-01-22 PROCEDURE — 99214 OFFICE O/P EST MOD 30 MIN: CPT | Performed by: INTERNAL MEDICINE

## 2025-01-22 PROCEDURE — 3078F DIAST BP <80 MM HG: CPT | Performed by: INTERNAL MEDICINE

## 2025-01-22 PROCEDURE — 3074F SYST BP LT 130 MM HG: CPT | Performed by: INTERNAL MEDICINE

## 2025-01-22 NOTE — CARE COORDINATION
Referral from CTN, Joslyn Farris RN-  [facility], sending patient referral for persistent, daily nausea and vomiting; need for strategies to maintain optimal nutrition. PMH: Rux en Y surgery in 2013, diabetes mellitus(on insulin), left hip arthroplasty (complicated with infection, hardware removed, got infected, on doxycycline until 1/16/2025 for Klebsiella infection, previous infection of MRSA, and VRE), s/p left fractured tibia and fibula, anxiety and depression(on Wellbutrin and Prozac), SVT(on Cardizem), frostbite (amputation of second and fourth fingers on left hand), alcohol abuse, DVT s/p IVC filter, antiphospholipid syndrome (on fondaparinux), fibromyalgia, hepatic adenoma, hyperlipidemia, hypertension, and skin carcinoma.  As per patient, bariatric surgery was complicated with ventral incisional hernia, required laparoscopic adhesions lysis.     She reports vomiting 5-6 times per day, has issues with poorly healing wounds. Hip hardware removed until this can be replaced. She agrees to consult. Please let me know if you have any other questions. Thank you, Joslyn Farris [phone]     Attempted to reach patient. LVM with contact information.  Will attempt to reach again within 1 week.  JONELLE Shin

## 2025-01-22 NOTE — PROGRESS NOTES
[facility] -   Infectious diseases evaluation    Left hip wound ,no signs of wound infection currently/Intramedullary bone marrow edema extending from the proximal into the mid left femoral diaphysis nonspecific seen on MRI 12/16/2024 concerning for chronic osteomyelitis  S/P left hip hemiarthroplasty performed by Dr. Traore on 7/19/2023 after sustaining a femoral neck fracture.    S/P left hip I&D /placement of antibiotic cement beads on 8/21/2023 due to infection post hemiarthroplasty.  The patient was treated with Avycaz due to resistant Klebsiella on culture.S/P left hip [facility] at [facility].    DM  Alcohol abuse   Antiphospholipid syndrome   H/O bariatric surgery   H/O PE/DVT status post IVC filter  H/O MRSA/MDRO/VRE/Acinetobacter  History of C. difficile colitis      HENCE:     Monitor off antibiotics for now  Follow-up with wound care at [facility]  Follow CBC, C-reactive protein prior to next visit in 3 months.  Follow-up as needed          History of Present Illness:   Initial history:  Krista Chiang is a 44 y.o.-female  She had a chronic left hip wound with no bone exposure, no necrotic tissue or purulent drainage, no surrounding redness.  Left hip MRI 12/16/2024  IMPRESSION:  1. Exam limited by artifact  2. Large lateral left hip wound with linear edema communicating around the  posterior margin of the proximal femur to the left hip joint.  No discrete  drainable abscess or fluid collection.  3. Intramedullary bone marrow edema extending from the proximal into the mid  left femoral diaphysis is nonspecific.  Infection not excluded.  Liver ultrasound 1/3/2025 showed  IMPRESSION:  1. Limited evaluation of the liver due to the overlying ribs and right lung  base.  Much of the superior aspect of the liver was not visible.  The the  visualized portions of the liver are echogenic suggesting hepatic steatosis  with no focal liver lesion identified.  2. Previous

## 2025-01-28 ENCOUNTER — CARE COORDINATION (OUTPATIENT)
Dept: CARE COORDINATION | Age: 45
End: 2025-01-28

## 2025-01-28 NOTE — CARE COORDINATION
Attempted to reach patient today for nutrition CC consult.  LVM with contact information requesting a return call to [phone].  Will attempt to reach again within 1-2 weeks.  JONELLE Otoole

## 2025-01-29 ENCOUNTER — CARE COORDINATION (OUTPATIENT)
Dept: CARE COORDINATION | Age: 45
End: 2025-01-29

## 2025-01-29 NOTE — CARE COORDINATION
Care Transitions Note    Follow Up Call     Attempted to reach patient for transitions of care follow up.  Unable to reach patient.      Outreach Attempts:   HIPAA compliant voicemail left for patient.     Care Summary Note: First attempt follow up, left VM    Follow Up Appointment:   Future Appointments         Provider Specialty Dept Phone    3/12/2025 10:30 AM Jerome Flannery MD Infectious Diseases [phone]            Plan for follow-up call in 2-5 days based on severity of symptoms and risk factors. Plan for next call: Follow up RD call? Discuss GI appt, how is therapy going, did she get orthopedic shoe?      Joslyn Farris RN

## 2025-02-03 ENCOUNTER — HOSPITAL ENCOUNTER (EMERGENCY)
Age: 45
Discharge: HOME | End: 2025-02-03
Attending: STUDENT IN AN ORGANIZED HEALTH CARE EDUCATION/TRAINING PROGRAM
Payer: COMMERCIAL

## 2025-02-03 VITALS
DIASTOLIC BLOOD PRESSURE: 61 MMHG | HEART RATE: 96 BPM | BODY MASS INDEX: 31.07 KG/M2 | RESPIRATION RATE: 18 BRPM | HEIGHT: 68 IN | TEMPERATURE: 98.2 F | WEIGHT: 205 LBS | SYSTOLIC BLOOD PRESSURE: 115 MMHG | OXYGEN SATURATION: 98 %

## 2025-02-03 DIAGNOSIS — G89.29 CHRONIC LEFT HIP PAIN: ICD-10-CM

## 2025-02-03 DIAGNOSIS — R11.2 NAUSEA AND VOMITING, UNSPECIFIED VOMITING TYPE: Primary | ICD-10-CM

## 2025-02-03 DIAGNOSIS — M25.552 CHRONIC LEFT HIP PAIN: ICD-10-CM

## 2025-02-03 LAB
ANION GAP SERPL CALCULATED.3IONS-SCNC: 14 MMOL/L (ref 9–16)
BASOPHILS # BLD: 0.07 K/UL (ref 0–0.2)
BASOPHILS NFR BLD: 1 % (ref 0–2)
BUN SERPL-MCNC: 10 MG/DL (ref 6–20)
CALCIUM SERPL-MCNC: 8.8 MG/DL (ref 8.6–10.4)
CHLORIDE SERPL-SCNC: 104 MMOL/L (ref 98–107)
CO2 SERPL-SCNC: 22 MMOL/L (ref 20–31)
CREAT SERPL-MCNC: 0.5 MG/DL (ref 0.7–1.2)
CRP SERPL HS-MCNC: <3 MG/L (ref 0–5)
EOSINOPHIL # BLD: 0.07 K/UL (ref 0–0.4)
EOSINOPHILS RELATIVE PERCENT: 1 % (ref 0–4)
ERYTHROCYTE [DISTWIDTH] IN BLOOD BY AUTOMATED COUNT: 22 % (ref 11.5–14.9)
ERYTHROCYTE [SEDIMENTATION RATE] IN BLOOD BY PHOTOMETRIC METHOD: 14 MM/HR (ref 0–20)
FLUAV RNA RESP QL NAA+PROBE: NOT DETECTED
FLUBV RNA RESP QL NAA+PROBE: NOT DETECTED
GFR, ESTIMATED: >90 ML/MIN/1.73M2
GLUCOSE SERPL-MCNC: 163 MG/DL (ref 74–99)
HCT VFR BLD AUTO: 39.2 % (ref 36–46)
HGB BLD-MCNC: 12.4 G/DL (ref 12–16)
LYMPHOCYTES NFR BLD: 2.11 K/UL (ref 1–4.8)
LYMPHOCYTES RELATIVE PERCENT: 32 % (ref 24–44)
MAGNESIUM SERPL-MCNC: 2.7 MG/DL (ref 1.6–2.6)
MCH RBC QN AUTO: 26.4 PG (ref 26–34)
MCHC RBC AUTO-ENTMCNC: 31.6 G/DL (ref 31–37)
MCV RBC AUTO: 83.5 FL (ref 80–100)
MONOCYTES NFR BLD: 0.46 K/UL (ref 0.1–1.3)
MONOCYTES NFR BLD: 7 % (ref 1–7)
MORPHOLOGY: ABNORMAL
NEUTROPHILS NFR BLD: 59 % (ref 36–66)
NEUTS SEG NFR BLD: 3.89 K/UL (ref 1.3–9.1)
PLATELET # BLD AUTO: 381 K/UL (ref 150–450)
PMV BLD AUTO: 9.9 FL (ref 6–12)
POTASSIUM SERPL-SCNC: 3.7 MMOL/L (ref 3.7–5.3)
PROCALCITONIN SERPL-MCNC: 0.06 NG/ML (ref 0–0.09)
RBC # BLD AUTO: 4.7 M/UL (ref 4–5.2)
SARS-COV-2 RNA RESP QL NAA+PROBE: NOT DETECTED
SODIUM SERPL-SCNC: 140 MMOL/L (ref 136–145)
SOURCE: NORMAL
SPECIMEN DESCRIPTION: NORMAL
WBC OTHER # BLD: 6.6 K/UL (ref 3.5–11)

## 2025-02-03 PROCEDURE — 86140 C-REACTIVE PROTEIN: CPT

## 2025-02-03 PROCEDURE — 87636 SARSCOV2 & INF A&B AMP PRB: CPT

## 2025-02-03 PROCEDURE — 85025 COMPLETE CBC W/AUTO DIFF WBC: CPT

## 2025-02-03 PROCEDURE — 87070 CULTURE OTHR SPECIMN AEROBIC: CPT

## 2025-02-03 PROCEDURE — 84145 PROCALCITONIN (PCT): CPT

## 2025-02-03 PROCEDURE — 96376 TX/PRO/DX INJ SAME DRUG ADON: CPT

## 2025-02-03 PROCEDURE — 96372 THER/PROPH/DIAG INJ SC/IM: CPT

## 2025-02-03 PROCEDURE — 87205 SMEAR GRAM STAIN: CPT

## 2025-02-03 PROCEDURE — 36415 COLL VENOUS BLD VENIPUNCTURE: CPT

## 2025-02-03 PROCEDURE — 87075 CULTR BACTERIA EXCEPT BLOOD: CPT

## 2025-02-03 PROCEDURE — 80048 BASIC METABOLIC PNL TOTAL CA: CPT

## 2025-02-03 PROCEDURE — 87186 SC STD MICRODIL/AGAR DIL: CPT

## 2025-02-03 PROCEDURE — 99284 EMERGENCY DEPT VISIT MOD MDM: CPT

## 2025-02-03 PROCEDURE — 6360000002 HC RX W HCPCS: Performed by: STUDENT IN AN ORGANIZED HEALTH CARE EDUCATION/TRAINING PROGRAM

## 2025-02-03 PROCEDURE — 83735 ASSAY OF MAGNESIUM: CPT

## 2025-02-03 PROCEDURE — 85652 RBC SED RATE AUTOMATED: CPT

## 2025-02-03 PROCEDURE — 2580000003 HC RX 258: Performed by: STUDENT IN AN ORGANIZED HEALTH CARE EDUCATION/TRAINING PROGRAM

## 2025-02-03 PROCEDURE — 96374 THER/PROPH/DIAG INJ IV PUSH: CPT

## 2025-02-03 PROCEDURE — 87077 CULTURE AEROBIC IDENTIFY: CPT

## 2025-02-03 PROCEDURE — 96375 TX/PRO/DX INJ NEW DRUG ADDON: CPT

## 2025-02-03 RX ORDER — ONDANSETRON 2 MG/ML
4 INJECTION INTRAMUSCULAR; INTRAVENOUS ONCE
Status: DISCONTINUED | OUTPATIENT
Start: 2025-02-03 | End: 2025-02-03

## 2025-02-03 RX ORDER — 0.9 % SODIUM CHLORIDE 0.9 %
1000 INTRAVENOUS SOLUTION INTRAVENOUS ONCE
Status: COMPLETED | OUTPATIENT
Start: 2025-02-03 | End: 2025-02-03

## 2025-02-03 RX ORDER — PROMETHAZINE HYDROCHLORIDE 25 MG/1
25 TABLET ORAL EVERY 6 HOURS PRN
Qty: 20 TABLET | Refills: 0 | Status: SHIPPED | OUTPATIENT
Start: 2025-02-03 | End: 2025-02-10

## 2025-02-03 RX ORDER — ONDANSETRON 2 MG/ML
4 INJECTION INTRAMUSCULAR; INTRAVENOUS ONCE
Status: COMPLETED | OUTPATIENT
Start: 2025-02-03 | End: 2025-02-03

## 2025-02-03 RX ORDER — PROMETHAZINE HYDROCHLORIDE 25 MG/ML
25 INJECTION, SOLUTION INTRAMUSCULAR; INTRAVENOUS ONCE
Status: COMPLETED | OUTPATIENT
Start: 2025-02-03 | End: 2025-02-03

## 2025-02-03 RX ADMIN — ONDANSETRON 4 MG: 2 INJECTION, SOLUTION INTRAMUSCULAR; INTRAVENOUS at 15:59

## 2025-02-03 RX ADMIN — HYDROMORPHONE HYDROCHLORIDE 0.5 MG: 1 INJECTION, SOLUTION INTRAMUSCULAR; INTRAVENOUS; SUBCUTANEOUS at 15:58

## 2025-02-03 RX ADMIN — SODIUM CHLORIDE 1000 ML: 9 INJECTION, SOLUTION INTRAVENOUS at 14:27

## 2025-02-03 RX ADMIN — PROMETHAZINE HYDROCHLORIDE 25 MG: 25 INJECTION INTRAMUSCULAR; INTRAVENOUS at 14:28

## 2025-02-03 RX ADMIN — HYDROMORPHONE HYDROCHLORIDE 1 MG: 1 INJECTION, SOLUTION INTRAMUSCULAR; INTRAVENOUS; SUBCUTANEOUS at 14:31

## 2025-02-03 ASSESSMENT — PAIN - FUNCTIONAL ASSESSMENT: PAIN_FUNCTIONAL_ASSESSMENT: 0-10

## 2025-02-03 ASSESSMENT — ENCOUNTER SYMPTOMS
ABDOMINAL PAIN: 1
NAUSEA: 1
SHORTNESS OF BREATH: 0
VOMITING: 1

## 2025-02-03 ASSESSMENT — PAIN SCALES - GENERAL
PAINLEVEL_OUTOF10: 7
PAINLEVEL_OUTOF10: 6
PAINLEVEL_OUTOF10: 7

## 2025-02-03 ASSESSMENT — PAIN DESCRIPTION - LOCATION
LOCATION: HIP
LOCATION: HIP

## 2025-02-03 NOTE — DISCHARGE INSTRUCTIONS
Please follow-up with your primary care provider and orthopedic team at [facility]  Please take the Phenergan as needed as prescribed  Please stay well-hydrated  Return to the ER with any severe worsening of symptoms that you cannot control at home

## 2025-02-03 NOTE — ED TRIAGE NOTES
Mode of arrival (squad #, walk in, police, etc) : Walk in        Chief complaint(s): Fall, emesis, hip pain        Arrival Note (brief scenario, treatment PTA, etc).: Pt arrives to ED c/o emesis ongoing for the last 8 days. Patient reports a history of infection in her hip following a replacement surgery and her PCP told her to come to the ED as she is concerned she may have another infection.

## 2025-02-03 NOTE — ED PROVIDER NOTES
existing medications or new prescriptions were reviewed with patient/patient representative and they expressed understanding of how to correctly take their medications and the possible side effects. I have reviewed the disposition diagnosis with the patient/patient representative.  I have answered their questions and given discharge instructions.  They voiced understanding of these instructions and did not have any further questions or complaints. They were updated on all incidental findings.      PATIENT REFERRED TO:  Arielle Melton, APRN - NP  [address]  [place] [address]  [phone]    Schedule an appointment as soon as possible for a visit         DISCHARGE MEDICATIONS:  Current Discharge Medication List        START taking these medications    Details   promethazine (PHENERGAN) 25 MG tablet Take 1 tablet by mouth every 6 hours as needed for Nausea WARNING:  May cause drowsiness.  May impair ability to operate vehicles or machinery.  Do not use in combination with alcohol.  Qty: 20 tablet, Refills: 0             Gautam Lundy DO  Emergency Medicine Attending    (Please note that portions of this note were completed with a voice recognition program.  Efforts were made to edit the dictations but occasionally words are mis-transcribed.)          Gautam Lundy DO  02/03/25 [id number]

## 2025-02-04 ENCOUNTER — CARE COORDINATION (OUTPATIENT)
Dept: CARE COORDINATION | Age: 45
End: 2025-02-04

## 2025-02-04 NOTE — CARE COORDINATION
Attempted to reach patient today for nutrition CC consult.  LVM with contact information requesting a return call to [phone].  Will attempt to reach again within 1-2 weeks.  JONELLE Otoole

## 2025-02-04 NOTE — CARE COORDINATION
Care Transitions Note    Follow Up Call     Attempted to reach patient for transitions of care follow up.  Unable to reach patient.      Outreach Attempts:   HIPAA compliant voicemail left for patient.     Care Summary Note: Attempt for follow up #2/ ED follow up. Left VM.     Follow Up Appointment:   Future Appointments         Provider Specialty Dept Phone    3/12/2025 10:30 AM Jerome Flannery MD Infectious Diseases [phone]            Plan for follow-up on next business day.  based on severity of symptoms and risk factors. Plan for next call:  ED follow up/ close if stable    Joslyn Farris RN

## 2025-02-05 ENCOUNTER — CARE COORDINATION (OUTPATIENT)
Dept: CARE COORDINATION | Age: 45
End: 2025-02-05

## 2025-02-05 NOTE — CARE COORDINATION
Care Transitions Note    Follow Up Call     Attempted to reach patient for transitions of care follow up.  Unable to reach patient.      Outreach Attempts:   Multiple attempts to contact patient at phone numbers on file.   HIPAA compliant voicemail left for patient.     Care Summary Note: Second attempt ED f/u/ 3rd attempt f/u. Left VM, closing for PAOLA    Follow Up Appointment:   Future Appointments         Provider Specialty Dept Phone    3/12/2025 10:30 AM Jerome Flannery MD Infectious Diseases [phone]            No further follow-up call indicated based on severity of symptoms and risk factors. Plan for next call:  ROBBIN Farris RN

## 2025-02-07 LAB
MICROORGANISM SPEC CULT: ABNORMAL
MICROORGANISM/AGENT SPEC: ABNORMAL
SPECIMEN DESCRIPTION: ABNORMAL

## 2025-02-10 ENCOUNTER — CARE COORDINATION (OUTPATIENT)
Dept: CARE COORDINATION | Age: 45
End: 2025-02-10

## 2025-02-10 NOTE — CARE COORDINATION
Attempted to reach patient today for nutrition CC consult.  LVM with contact information requesting a return call to [phone].  Unable to reach patient despite  multiple attempts- removed.  JONELLE Otoole

## 2025-02-15 ENCOUNTER — HOSPITAL ENCOUNTER (INPATIENT)
Age: 45
LOS: 5 days | Discharge: NURSING, REHAB OR LONG-TERM CARE | DRG: 920 | End: 2025-02-20
Attending: STUDENT IN AN ORGANIZED HEALTH CARE EDUCATION/TRAINING PROGRAM | Admitting: INTERNAL MEDICINE
Payer: COMMERCIAL

## 2025-02-15 DIAGNOSIS — L97.112: ICD-10-CM

## 2025-02-15 DIAGNOSIS — S71.002D: ICD-10-CM

## 2025-02-15 DIAGNOSIS — L98.492 SKIN ULCER OF ABDOMEN WITH FAT LAYER EXPOSED (HCC): ICD-10-CM

## 2025-02-15 DIAGNOSIS — L08.9 WOUND INFECTION: Primary | ICD-10-CM

## 2025-02-15 DIAGNOSIS — S71.102D: ICD-10-CM

## 2025-02-15 DIAGNOSIS — T14.8XXA WOUND INFECTION: Primary | ICD-10-CM

## 2025-02-15 DIAGNOSIS — T81.89XD NONHEALING SURGICAL WOUND, SUBSEQUENT ENCOUNTER: ICD-10-CM

## 2025-02-15 DIAGNOSIS — T14.8XXA CHRONIC WOUND: ICD-10-CM

## 2025-02-15 DIAGNOSIS — T81.31XA DEHISCENCE OF OPERATIVE WOUND, INITIAL ENCOUNTER: ICD-10-CM

## 2025-02-15 LAB
ANION GAP SERPL CALCULATED.3IONS-SCNC: 11 MMOL/L (ref 9–16)
BASOPHILS # BLD: 0.05 K/UL (ref 0–0.2)
BASOPHILS NFR BLD: 1 % (ref 0–2)
BILIRUB UR QL STRIP: NEGATIVE
BUN SERPL-MCNC: 11 MG/DL (ref 6–20)
CALCIUM SERPL-MCNC: 8.5 MG/DL (ref 8.6–10.4)
CHLORIDE SERPL-SCNC: 103 MMOL/L (ref 98–107)
CLARITY UR: CLEAR
CO2 SERPL-SCNC: 25 MMOL/L (ref 20–31)
COLOR UR: YELLOW
COMMENT: ABNORMAL
CREAT SERPL-MCNC: 0.6 MG/DL (ref 0.7–1.2)
EOSINOPHIL # BLD: 0.05 K/UL (ref 0–0.4)
EOSINOPHILS RELATIVE PERCENT: 1 % (ref 0–4)
ERYTHROCYTE [DISTWIDTH] IN BLOOD BY AUTOMATED COUNT: 20.7 % (ref 11.5–14.9)
GFR, ESTIMATED: >90 ML/MIN/1.73M2
GLUCOSE BLD-MCNC: 166 MG/DL (ref 65–105)
GLUCOSE BLD-MCNC: 248 MG/DL (ref 65–105)
GLUCOSE SERPL-MCNC: 228 MG/DL (ref 74–99)
GLUCOSE UR STRIP-MCNC: ABNORMAL MG/DL
HCT VFR BLD AUTO: 35.3 % (ref 36–46)
HGB BLD-MCNC: 11.1 G/DL (ref 12–16)
HGB UR QL STRIP.AUTO: NEGATIVE
KETONES UR STRIP-MCNC: NEGATIVE MG/DL
LEUKOCYTE ESTERASE UR QL STRIP: NEGATIVE
LYMPHOCYTES NFR BLD: 1.4 K/UL (ref 1–4.8)
LYMPHOCYTES RELATIVE PERCENT: 27 % (ref 24–44)
MAGNESIUM SERPL-MCNC: 1.7 MG/DL (ref 1.6–2.6)
MCH RBC QN AUTO: 26.6 PG (ref 26–34)
MCHC RBC AUTO-ENTMCNC: 31.6 G/DL (ref 31–37)
MCV RBC AUTO: 84.4 FL (ref 80–100)
MONOCYTES NFR BLD: 0.36 K/UL (ref 0.1–1.3)
MONOCYTES NFR BLD: 7 % (ref 1–7)
MORPHOLOGY: ABNORMAL
NEUTROPHILS NFR BLD: 64 % (ref 36–66)
NEUTS SEG NFR BLD: 3.34 K/UL (ref 1.3–9.1)
NITRITE UR QL STRIP: NEGATIVE
PH UR STRIP: 6 [PH] (ref 5–8)
PLATELET # BLD AUTO: 343 K/UL (ref 150–450)
PMV BLD AUTO: 10.4 FL (ref 6–12)
POTASSIUM SERPL-SCNC: 3.4 MMOL/L (ref 3.7–5.3)
PROCALCITONIN SERPL-MCNC: 0.08 NG/ML (ref 0–0.09)
PROT UR STRIP-MCNC: NEGATIVE MG/DL
RBC # BLD AUTO: 4.18 M/UL (ref 4–5.2)
SODIUM SERPL-SCNC: 139 MMOL/L (ref 136–145)
SP GR UR STRIP: 1.01 (ref 1–1.03)
UROBILINOGEN UR STRIP-ACNC: NORMAL EU/DL (ref 0–1)
WBC OTHER # BLD: 5.2 K/UL (ref 3.5–11)

## 2025-02-15 PROCEDURE — 2500000003 HC RX 250 WO HCPCS: Performed by: STUDENT IN AN ORGANIZED HEALTH CARE EDUCATION/TRAINING PROGRAM

## 2025-02-15 PROCEDURE — 84145 PROCALCITONIN (PCT): CPT

## 2025-02-15 PROCEDURE — 80048 BASIC METABOLIC PNL TOTAL CA: CPT

## 2025-02-15 PROCEDURE — 2580000003 HC RX 258: Performed by: STUDENT IN AN ORGANIZED HEALTH CARE EDUCATION/TRAINING PROGRAM

## 2025-02-15 PROCEDURE — 82947 ASSAY GLUCOSE BLOOD QUANT: CPT

## 2025-02-15 PROCEDURE — 6360000002 HC RX W HCPCS

## 2025-02-15 PROCEDURE — 6370000000 HC RX 637 (ALT 250 FOR IP)

## 2025-02-15 PROCEDURE — 87075 CULTR BACTERIA EXCEPT BLOOD: CPT

## 2025-02-15 PROCEDURE — 96374 THER/PROPH/DIAG INJ IV PUSH: CPT

## 2025-02-15 PROCEDURE — 81003 URINALYSIS AUTO W/O SCOPE: CPT

## 2025-02-15 PROCEDURE — 87086 URINE CULTURE/COLONY COUNT: CPT

## 2025-02-15 PROCEDURE — 87077 CULTURE AEROBIC IDENTIFY: CPT

## 2025-02-15 PROCEDURE — 36415 COLL VENOUS BLD VENIPUNCTURE: CPT

## 2025-02-15 PROCEDURE — 86403 PARTICLE AGGLUT ANTBDY SCRN: CPT

## 2025-02-15 PROCEDURE — 1200000000 HC SEMI PRIVATE

## 2025-02-15 PROCEDURE — 6360000002 HC RX W HCPCS: Performed by: STUDENT IN AN ORGANIZED HEALTH CARE EDUCATION/TRAINING PROGRAM

## 2025-02-15 PROCEDURE — 96375 TX/PRO/DX INJ NEW DRUG ADDON: CPT

## 2025-02-15 PROCEDURE — 85025 COMPLETE CBC W/AUTO DIFF WBC: CPT

## 2025-02-15 PROCEDURE — 83735 ASSAY OF MAGNESIUM: CPT

## 2025-02-15 PROCEDURE — 2580000003 HC RX 258

## 2025-02-15 PROCEDURE — 87040 BLOOD CULTURE FOR BACTERIA: CPT

## 2025-02-15 PROCEDURE — 99285 EMERGENCY DEPT VISIT HI MDM: CPT

## 2025-02-15 PROCEDURE — 87205 SMEAR GRAM STAIN: CPT

## 2025-02-15 PROCEDURE — 87186 SC STD MICRODIL/AGAR DIL: CPT

## 2025-02-15 PROCEDURE — 6370000000 HC RX 637 (ALT 250 FOR IP): Performed by: NURSE PRACTITIONER

## 2025-02-15 PROCEDURE — 87070 CULTURE OTHR SPECIMN AEROBIC: CPT

## 2025-02-15 RX ORDER — SCOPOLAMINE 1 MG/3D
1 PATCH, EXTENDED RELEASE TRANSDERMAL
Status: DISCONTINUED | OUTPATIENT
Start: 2025-02-15 | End: 2025-02-20 | Stop reason: HOSPADM

## 2025-02-15 RX ORDER — INSULIN LISPRO 100 [IU]/ML
0-8 INJECTION, SOLUTION INTRAVENOUS; SUBCUTANEOUS
Status: DISCONTINUED | OUTPATIENT
Start: 2025-02-15 | End: 2025-02-16

## 2025-02-15 RX ORDER — PANTOPRAZOLE SODIUM 40 MG/1
40 TABLET, DELAYED RELEASE ORAL
Status: DISCONTINUED | OUTPATIENT
Start: 2025-02-16 | End: 2025-02-20 | Stop reason: HOSPADM

## 2025-02-15 RX ORDER — DILTIAZEM HYDROCHLORIDE 120 MG/1
120 CAPSULE, COATED, EXTENDED RELEASE ORAL DAILY
Status: DISCONTINUED | OUTPATIENT
Start: 2025-02-16 | End: 2025-02-20 | Stop reason: HOSPADM

## 2025-02-15 RX ORDER — SODIUM CHLORIDE 9 MG/ML
INJECTION, SOLUTION INTRAVENOUS PRN
Status: DISCONTINUED | OUTPATIENT
Start: 2025-02-15 | End: 2025-02-20 | Stop reason: HOSPADM

## 2025-02-15 RX ORDER — ONDANSETRON 2 MG/ML
4 INJECTION INTRAMUSCULAR; INTRAVENOUS EVERY 6 HOURS PRN
Status: CANCELLED | OUTPATIENT
Start: 2025-02-15

## 2025-02-15 RX ORDER — SODIUM CHLORIDE 0.9 % (FLUSH) 0.9 %
5-40 SYRINGE (ML) INJECTION EVERY 12 HOURS SCHEDULED
Status: DISCONTINUED | OUTPATIENT
Start: 2025-02-15 | End: 2025-02-20 | Stop reason: HOSPADM

## 2025-02-15 RX ORDER — DEXTROSE MONOHYDRATE 100 MG/ML
INJECTION, SOLUTION INTRAVENOUS CONTINUOUS PRN
Status: DISCONTINUED | OUTPATIENT
Start: 2025-02-15 | End: 2025-02-20 | Stop reason: HOSPADM

## 2025-02-15 RX ORDER — GABAPENTIN 300 MG/1
300 CAPSULE ORAL 3 TIMES DAILY
Status: DISCONTINUED | OUTPATIENT
Start: 2025-02-15 | End: 2025-02-16

## 2025-02-15 RX ORDER — MORPHINE SULFATE 2 MG/ML
2 INJECTION, SOLUTION INTRAMUSCULAR; INTRAVENOUS ONCE
Status: CANCELLED | OUTPATIENT
Start: 2025-02-15

## 2025-02-15 RX ORDER — ENOXAPARIN SODIUM 100 MG/ML
40 INJECTION SUBCUTANEOUS DAILY
Status: CANCELLED | OUTPATIENT
Start: 2025-02-15

## 2025-02-15 RX ORDER — INSULIN GLARGINE 100 [IU]/ML
35 INJECTION, SOLUTION SUBCUTANEOUS NIGHTLY
Status: DISCONTINUED | OUTPATIENT
Start: 2025-02-15 | End: 2025-02-16

## 2025-02-15 RX ORDER — MAGNESIUM SULFATE HEPTAHYDRATE 40 MG/ML
2000 INJECTION, SOLUTION INTRAVENOUS PRN
Status: DISCONTINUED | OUTPATIENT
Start: 2025-02-15 | End: 2025-02-20 | Stop reason: HOSPADM

## 2025-02-15 RX ORDER — INSULIN LISPRO 100 [IU]/ML
0-4 INJECTION, SOLUTION INTRAVENOUS; SUBCUTANEOUS
Status: CANCELLED | OUTPATIENT
Start: 2025-02-15

## 2025-02-15 RX ORDER — POTASSIUM CHLORIDE 1500 MG/1
40 TABLET, EXTENDED RELEASE ORAL PRN
Status: DISCONTINUED | OUTPATIENT
Start: 2025-02-15 | End: 2025-02-20 | Stop reason: HOSPADM

## 2025-02-15 RX ORDER — ACETAMINOPHEN 325 MG/1
650 TABLET ORAL EVERY 6 HOURS PRN
Status: DISCONTINUED | OUTPATIENT
Start: 2025-02-15 | End: 2025-02-20 | Stop reason: HOSPADM

## 2025-02-15 RX ORDER — ACETAMINOPHEN 650 MG/1
650 SUPPOSITORY RECTAL EVERY 6 HOURS PRN
Status: DISCONTINUED | OUTPATIENT
Start: 2025-02-15 | End: 2025-02-20 | Stop reason: HOSPADM

## 2025-02-15 RX ORDER — FONDAPARINUX SODIUM 7.5 MG/.6ML
7.5 INJECTION SUBCUTANEOUS DAILY
Status: DISCONTINUED | OUTPATIENT
Start: 2025-02-16 | End: 2025-02-20 | Stop reason: HOSPADM

## 2025-02-15 RX ORDER — OXYCODONE AND ACETAMINOPHEN 5; 325 MG/1; MG/1
1 TABLET ORAL EVERY 4 HOURS PRN
Status: DISCONTINUED | OUTPATIENT
Start: 2025-02-15 | End: 2025-02-16

## 2025-02-15 RX ORDER — ONDANSETRON 2 MG/ML
4 INJECTION INTRAMUSCULAR; INTRAVENOUS ONCE
Status: COMPLETED | OUTPATIENT
Start: 2025-02-15 | End: 2025-02-15

## 2025-02-15 RX ORDER — MIDODRINE HYDROCHLORIDE 2.5 MG/1
2.5 TABLET ORAL 2 TIMES DAILY PRN
Status: DISCONTINUED | OUTPATIENT
Start: 2025-02-15 | End: 2025-02-20 | Stop reason: HOSPADM

## 2025-02-15 RX ORDER — BUMETANIDE 1 MG/1
2 TABLET ORAL DAILY PRN
Status: DISCONTINUED | OUTPATIENT
Start: 2025-02-15 | End: 2025-02-20 | Stop reason: HOSPADM

## 2025-02-15 RX ORDER — POTASSIUM CHLORIDE 7.45 MG/ML
10 INJECTION INTRAVENOUS PRN
Status: DISCONTINUED | OUTPATIENT
Start: 2025-02-15 | End: 2025-02-20 | Stop reason: HOSPADM

## 2025-02-15 RX ORDER — SUCRALFATE 1 G/1
1 TABLET ORAL 4 TIMES DAILY
Status: DISCONTINUED | OUTPATIENT
Start: 2025-02-15 | End: 2025-02-20 | Stop reason: HOSPADM

## 2025-02-15 RX ORDER — SODIUM CHLORIDE 0.9 % (FLUSH) 0.9 %
5-40 SYRINGE (ML) INJECTION PRN
Status: DISCONTINUED | OUTPATIENT
Start: 2025-02-15 | End: 2025-02-20 | Stop reason: HOSPADM

## 2025-02-15 RX ORDER — ONDANSETRON 4 MG/1
4 TABLET, ORALLY DISINTEGRATING ORAL EVERY 8 HOURS PRN
Status: CANCELLED | OUTPATIENT
Start: 2025-02-15

## 2025-02-15 RX ORDER — POLYETHYLENE GLYCOL 3350 17 G/17G
17 POWDER, FOR SOLUTION ORAL DAILY PRN
Status: DISCONTINUED | OUTPATIENT
Start: 2025-02-15 | End: 2025-02-20 | Stop reason: HOSPADM

## 2025-02-15 RX ORDER — OXYCODONE HYDROCHLORIDE 5 MG/1
2.5 TABLET ORAL ONCE
Status: COMPLETED | OUTPATIENT
Start: 2025-02-15 | End: 2025-02-15

## 2025-02-15 RX ORDER — MIDODRINE HYDROCHLORIDE 5 MG/1
2.5 TABLET ORAL 2 TIMES DAILY PRN
Status: DISCONTINUED | OUTPATIENT
Start: 2025-02-15 | End: 2025-02-15

## 2025-02-15 RX ORDER — VANCOMYCIN 1.75 G/350ML
1250 INJECTION, SOLUTION INTRAVENOUS EVERY 12 HOURS
Status: DISCONTINUED | OUTPATIENT
Start: 2025-02-16 | End: 2025-02-17

## 2025-02-15 RX ADMIN — GABAPENTIN 300 MG: 300 CAPSULE ORAL at 20:52

## 2025-02-15 RX ADMIN — WATER 1000 MG: 1 INJECTION INTRAMUSCULAR; INTRAVENOUS; SUBCUTANEOUS at 13:23

## 2025-02-15 RX ADMIN — SUCRALFATE 1 G: 1 TABLET ORAL at 18:00

## 2025-02-15 RX ADMIN — PIPERACILLIN AND TAZOBACTAM 4500 MG: 4; .5 INJECTION, POWDER, LYOPHILIZED, FOR SOLUTION INTRAVENOUS at 18:28

## 2025-02-15 RX ADMIN — SUCRALFATE 1 G: 1 TABLET ORAL at 20:52

## 2025-02-15 RX ADMIN — VANCOMYCIN HYDROCHLORIDE 2250 MG: 1.25 INJECTION, POWDER, LYOPHILIZED, FOR SOLUTION INTRAVENOUS at 14:13

## 2025-02-15 RX ADMIN — ONDANSETRON 4 MG: 2 INJECTION, SOLUTION INTRAMUSCULAR; INTRAVENOUS at 12:23

## 2025-02-15 RX ADMIN — INSULIN LISPRO 2 UNITS: 100 INJECTION, SOLUTION INTRAVENOUS; SUBCUTANEOUS at 20:53

## 2025-02-15 RX ADMIN — OXYCODONE HYDROCHLORIDE AND ACETAMINOPHEN 1 TABLET: 5; 325 TABLET ORAL at 16:02

## 2025-02-15 RX ADMIN — HYDROMORPHONE HYDROCHLORIDE 0.5 MG: 1 INJECTION, SOLUTION INTRAMUSCULAR; INTRAVENOUS; SUBCUTANEOUS at 13:18

## 2025-02-15 RX ADMIN — INSULIN GLARGINE 35 UNITS: 100 INJECTION, SOLUTION SUBCUTANEOUS at 20:53

## 2025-02-15 RX ADMIN — OXYCODONE HYDROCHLORIDE AND ACETAMINOPHEN 1 TABLET: 5; 325 TABLET ORAL at 20:52

## 2025-02-15 RX ADMIN — PIPERACILLIN AND TAZOBACTAM 3375 MG: 3; .375 INJECTION, POWDER, LYOPHILIZED, FOR SOLUTION INTRAVENOUS at 23:50

## 2025-02-15 RX ADMIN — OXYCODONE HYDROCHLORIDE 2.5 MG: 5 TABLET ORAL at 23:46

## 2025-02-15 ASSESSMENT — PAIN DESCRIPTION - LOCATION
LOCATION: HIP
LOCATION: LEG;ABDOMEN;HIP
LOCATION: HIP
LOCATION: LEG

## 2025-02-15 ASSESSMENT — PAIN DESCRIPTION - DESCRIPTORS: DESCRIPTORS: ACHING

## 2025-02-15 ASSESSMENT — PAIN SCALES - GENERAL
PAINLEVEL_OUTOF10: 7
PAINLEVEL_OUTOF10: 8
PAINLEVEL_OUTOF10: 8

## 2025-02-15 ASSESSMENT — PAIN DESCRIPTION - ORIENTATION
ORIENTATION: LEFT

## 2025-02-15 ASSESSMENT — PAIN DESCRIPTION - FREQUENCY: FREQUENCY: CONTINUOUS

## 2025-02-15 ASSESSMENT — PAIN - FUNCTIONAL ASSESSMENT: PAIN_FUNCTIONAL_ASSESSMENT: 0-10

## 2025-02-15 ASSESSMENT — PAIN DESCRIPTION - PAIN TYPE: TYPE: CHRONIC PAIN

## 2025-02-15 NOTE — ED TRIAGE NOTES
Pt here for left hip/leg wound since Thursday. Pt had a left hip replacement 1.5 years ago with a chronic non healing wound. Pt has new wounds on the inside of right thigh and abdomen.  Left hip wound cultures done last week. MRSA and strep

## 2025-02-15 NOTE — ED PROVIDER NOTES
Liver Resection (); Tonsillectomy; Abdominal hernia repair (); Abdominal hernia repair (2014); Bariatric Surgery (2013); Dilation and curettage of uterus (); Finger amputation (Left, 2015); lymph node biopsy (); Total abdominal hysterectomy w/ bilateral salpingoophorectomy (); IVC filter insertion; hip surgery (Left); Ankle fracture surgery (Left); hc cath power picc single (2023); Ankle fracture surgery (Left, 10/24/2023); Esophagogastroduodenoscopy (2021); Esophagogastroduodenoscopy (2013); laparoscopy (2013); Ankle surgery (Left, 2019);  section; hip surgery (Left, 2023); hip surgery (Left, 2023); hip surgery (Left, 2023); hip surgery (Left); Ankle surgery (Left, 2023); XR MIDLINE EQUAL OR GREATER THAN 5 YEARS (2013); Upper gastrointestinal endoscopy (N/A, 2024); and Upper gastrointestinal endoscopy (N/A, 2025).    Family History   Problem Relation Age of Onset    Depression Mother     High Blood Pressure Mother     High Cholesterol Mother     Diabetes Father     Heart Disease Father     Kidney Disease Father     High Blood Pressure Father     High Cholesterol Father     Stroke Father     No Known Problems Brother     Breast Cancer Maternal Aunt     Cancer Maternal Uncle         of duodenum had whipple    Breast Cancer Maternal Cousin        Allergies:    Aripiprazole, Aspirin, Betadine [povidone iodine], Celebrex [celecoxib], Celexa [citalopram hydrobromide], Citalopram, Furosemide, Nitrofurantoin, Tricyclic antidepressants, Codeine, Lithium, Paroxetine, Paxil [paroxetine hcl], Povidone iodine, Sertraline, Tape [adhesive tape], and Tegretol [carbamazepine]    Home Medications:  Prior to Admission medications    Medication Sig Start Date End Date Taking? Authorizing Provider   bumetanide (BUMEX) 1 MG tablet Take 2 tablets by mouth as needed (as needed daily for swelling)    ProviderArsen MD

## 2025-02-15 NOTE — H&P
[facility]  IN-PATIENT SERVICE  [facility]  IN-PATIENT SERVICE   [facility]     HISTORY AND PHYSICAL EXAMINATION            Date:   2/15/2025  Patient name:  Krista Chiang  Date of admission:  2/15/2025 11:02 AM  MRN:   [id number]  Account:  [id number]  YOB: 1980  PCP:    Arielle Melton APRN - NP  Room:   14/14  Code Status:    Prior    Chief Complaint:     Chief Complaint   Patient presents with    Wound Dehiscence       History Obtained From:     patient    History of Present Illness:     The patient is a 44 y.o.  Non- / non  female who presents with Wound Dehiscence   and she is admitted to the hospital for the management of wound dehiscence and possible infection.    Patient has a past medical history of left hip arthroplasty (complicated with infection, hardware removed, got infected, on doxycycline until 1/16/2025 for Klebsiella infection, previous infection of MRSA, and VRE), s/p left fractured tibia and fibula, SVT on Cardizem, uncontrolled T2DM on insulin, DVT s/p IVC filter, antiphospholipid syndrome (on fondaparinux).    Today patient presented to ER with 3 open wounds located in posterior left buttock, right medial thigh and lower abdomen.  Per patient, her buttock wound wound had not been healing since 19 months, and this past Monday she noticed her wounds were opening up as well as new wound on lower abdomen and medial right thigh.  She reports severe pain as well as drainage from the wounds.  Denies fever, chills, bloody discharge or any other associated symptoms.  Patient also reports increased urinary frequency and urgency, denies dysuria, burning frequent sensation of any other associated UTI.    In ER, CBC negative for leukocytosis, BMP significant for low potassium 3.4, otherwise unremarkable.  Pro-Ej, ESR and CRP pending. Blood culture, wound culture

## 2025-02-16 LAB
GLUCOSE BLD-MCNC: 176 MG/DL (ref 65–105)
GLUCOSE BLD-MCNC: 197 MG/DL (ref 65–105)
GLUCOSE BLD-MCNC: 64 MG/DL (ref 65–105)
GLUCOSE BLD-MCNC: 71 MG/DL (ref 65–105)
GLUCOSE BLD-MCNC: 72 MG/DL (ref 65–105)

## 2025-02-16 PROCEDURE — 6360000002 HC RX W HCPCS

## 2025-02-16 PROCEDURE — 99223 1ST HOSP IP/OBS HIGH 75: CPT | Performed by: INTERNAL MEDICINE

## 2025-02-16 PROCEDURE — 1200000000 HC SEMI PRIVATE

## 2025-02-16 PROCEDURE — 82947 ASSAY GLUCOSE BLOOD QUANT: CPT

## 2025-02-16 PROCEDURE — 2500000003 HC RX 250 WO HCPCS

## 2025-02-16 PROCEDURE — 93005 ELECTROCARDIOGRAM TRACING: CPT

## 2025-02-16 PROCEDURE — 6370000000 HC RX 637 (ALT 250 FOR IP)

## 2025-02-16 PROCEDURE — 2580000003 HC RX 258

## 2025-02-16 RX ORDER — GABAPENTIN 300 MG/1
300 CAPSULE ORAL 3 TIMES DAILY PRN
Status: DISCONTINUED | OUTPATIENT
Start: 2025-02-16 | End: 2025-02-20 | Stop reason: HOSPADM

## 2025-02-16 RX ORDER — INSULIN LISPRO 100 [IU]/ML
0-4 INJECTION, SOLUTION INTRAVENOUS; SUBCUTANEOUS
Status: DISCONTINUED | OUTPATIENT
Start: 2025-02-16 | End: 2025-02-20 | Stop reason: HOSPADM

## 2025-02-16 RX ORDER — ONDANSETRON 4 MG/1
4 TABLET, ORALLY DISINTEGRATING ORAL EVERY 8 HOURS PRN
Status: DISCONTINUED | OUTPATIENT
Start: 2025-02-16 | End: 2025-02-20 | Stop reason: HOSPADM

## 2025-02-16 RX ORDER — LORAZEPAM 1 MG/1
1 TABLET ORAL PRN
Status: DISCONTINUED | OUTPATIENT
Start: 2025-02-16 | End: 2025-02-16

## 2025-02-16 RX ORDER — OXYCODONE AND ACETAMINOPHEN 5; 325 MG/1; MG/1
1 TABLET ORAL EVERY 4 HOURS PRN
Status: DISCONTINUED | OUTPATIENT
Start: 2025-02-16 | End: 2025-02-20 | Stop reason: HOSPADM

## 2025-02-16 RX ORDER — LORAZEPAM 1 MG/1
1 TABLET ORAL DAILY PRN
Status: DISCONTINUED | OUTPATIENT
Start: 2025-02-16 | End: 2025-02-20 | Stop reason: HOSPADM

## 2025-02-16 RX ORDER — INSULIN GLARGINE 100 [IU]/ML
30 INJECTION, SOLUTION SUBCUTANEOUS NIGHTLY
Status: DISCONTINUED | OUTPATIENT
Start: 2025-02-16 | End: 2025-02-17

## 2025-02-16 RX ORDER — MORPHINE SULFATE 2 MG/ML
1 INJECTION, SOLUTION INTRAMUSCULAR; INTRAVENOUS EVERY 8 HOURS PRN
Status: DISCONTINUED | OUTPATIENT
Start: 2025-02-16 | End: 2025-02-20 | Stop reason: HOSPADM

## 2025-02-16 RX ORDER — OXYCODONE HYDROCHLORIDE 5 MG/1
2.5 TABLET ORAL EVERY 4 HOURS PRN
Status: DISCONTINUED | OUTPATIENT
Start: 2025-02-16 | End: 2025-02-20 | Stop reason: HOSPADM

## 2025-02-16 RX ORDER — BUPROPION HYDROCHLORIDE 150 MG/1
150 TABLET ORAL EVERY MORNING
Status: DISCONTINUED | OUTPATIENT
Start: 2025-02-16 | End: 2025-02-20 | Stop reason: HOSPADM

## 2025-02-16 RX ORDER — LORAZEPAM 1 MG/1
1 TABLET ORAL NIGHTLY PRN
Status: DISCONTINUED | OUTPATIENT
Start: 2025-02-16 | End: 2025-02-16

## 2025-02-16 RX ADMIN — PIPERACILLIN AND TAZOBACTAM 3375 MG: 3; .375 INJECTION, POWDER, LYOPHILIZED, FOR SOLUTION INTRAVENOUS at 17:59

## 2025-02-16 RX ADMIN — ONDANSETRON 4 MG: 4 TABLET, ORALLY DISINTEGRATING ORAL at 14:53

## 2025-02-16 RX ADMIN — FONDAPARINUX SODIUM 7.5 MG: 7.5 INJECTION SUBCUTANEOUS at 10:36

## 2025-02-16 RX ADMIN — INSULIN LISPRO 1 UNITS: 100 INJECTION, SOLUTION INTRAVENOUS; SUBCUTANEOUS at 20:56

## 2025-02-16 RX ADMIN — DILTIAZEM HYDROCHLORIDE 120 MG: 120 CAPSULE, COATED, EXTENDED RELEASE ORAL at 10:34

## 2025-02-16 RX ADMIN — BUPROPION HYDROCHLORIDE 150 MG: 150 TABLET, EXTENDED RELEASE ORAL at 12:04

## 2025-02-16 RX ADMIN — VANCOMYCIN 1250 MG: 1.75 INJECTION, SOLUTION INTRAVENOUS at 14:53

## 2025-02-16 RX ADMIN — VANCOMYCIN 1250 MG: 1.75 INJECTION, SOLUTION INTRAVENOUS at 03:57

## 2025-02-16 RX ADMIN — SUCRALFATE 1 G: 1 TABLET ORAL at 20:56

## 2025-02-16 RX ADMIN — INSULIN GLARGINE 30 UNITS: 100 INJECTION, SOLUTION SUBCUTANEOUS at 20:57

## 2025-02-16 RX ADMIN — FLUOXETINE HYDROCHLORIDE 40 MG: 20 CAPSULE ORAL at 10:34

## 2025-02-16 RX ADMIN — OXYCODONE HYDROCHLORIDE AND ACETAMINOPHEN 1 TABLET: 5; 325 TABLET ORAL at 23:31

## 2025-02-16 RX ADMIN — OXYCODONE HYDROCHLORIDE AND ACETAMINOPHEN 1 TABLET: 5; 325 TABLET ORAL at 17:58

## 2025-02-16 RX ADMIN — MORPHINE SULFATE 1 MG: 2 INJECTION, SOLUTION INTRAMUSCULAR; INTRAVENOUS at 13:12

## 2025-02-16 RX ADMIN — SODIUM CHLORIDE, PRESERVATIVE FREE 10 ML: 5 INJECTION INTRAVENOUS at 13:13

## 2025-02-16 RX ADMIN — OXYCODONE HYDROCHLORIDE AND ACETAMINOPHEN 1 TABLET: 5; 325 TABLET ORAL at 10:34

## 2025-02-16 RX ADMIN — OXYCODONE HYDROCHLORIDE AND ACETAMINOPHEN 1 TABLET: 5; 325 TABLET ORAL at 06:55

## 2025-02-16 RX ADMIN — OXYCODONE HYDROCHLORIDE AND ACETAMINOPHEN 1 TABLET: 5; 325 TABLET ORAL at 01:10

## 2025-02-16 RX ADMIN — PANTOPRAZOLE SODIUM 40 MG: 40 TABLET, DELAYED RELEASE ORAL at 06:52

## 2025-02-16 RX ADMIN — SUCRALFATE 1 G: 1 TABLET ORAL at 10:34

## 2025-02-16 RX ADMIN — PIPERACILLIN AND TAZOBACTAM 3375 MG: 3; .375 INJECTION, POWDER, LYOPHILIZED, FOR SOLUTION INTRAVENOUS at 10:35

## 2025-02-16 RX ADMIN — LORAZEPAM 1 MG: 1 TABLET ORAL at 20:57

## 2025-02-16 RX ADMIN — SUCRALFATE 1 G: 1 TABLET ORAL at 13:14

## 2025-02-16 RX ADMIN — MORPHINE SULFATE 1 MG: 2 INJECTION, SOLUTION INTRAMUSCULAR; INTRAVENOUS at 21:35

## 2025-02-16 RX ADMIN — OXYCODONE HYDROCHLORIDE 2.5 MG: 5 TABLET ORAL at 14:37

## 2025-02-16 RX ADMIN — SUCRALFATE 1 G: 1 TABLET ORAL at 17:58

## 2025-02-16 ASSESSMENT — PAIN DESCRIPTION - LOCATION
LOCATION: ABDOMEN;HIP
LOCATION: LEG;HIP
LOCATION: LEG
LOCATION: ABDOMEN;HIP

## 2025-02-16 ASSESSMENT — PAIN DESCRIPTION - ORIENTATION
ORIENTATION: LEFT
ORIENTATION: LEFT
ORIENTATION: RIGHT
ORIENTATION: LEFT
ORIENTATION: LEFT
ORIENTATION: RIGHT;LEFT

## 2025-02-16 ASSESSMENT — PAIN SCALES - GENERAL
PAINLEVEL_OUTOF10: 4
PAINLEVEL_OUTOF10: 6
PAINLEVEL_OUTOF10: 7

## 2025-02-16 ASSESSMENT — PAIN DESCRIPTION - DESCRIPTORS
DESCRIPTORS: SHARP
DESCRIPTORS: SHARP
DESCRIPTORS: BURNING
DESCRIPTORS: SHARP
DESCRIPTORS: BURNING
DESCRIPTORS: BURNING;SHARP

## 2025-02-16 NOTE — CARE COORDINATION
Case Management Assessment  Initial Evaluation    Date/Time of Evaluation: 2/16/2025 9:09 AM  Assessment Completed by: Geri Pringle RN    If patient is discharged prior to next notation, then this note serves as note for discharge by case management.    Patient Name: Krista Chiang                   YOB: 1980  Diagnosis: Wound infection [T14.8XXA, L08.9]  Wound dehiscence [T81.30XA]  Dehiscence of operative wound, initial encounter [T81.31XA]                   Date / Time: 2/15/2025 11:02 AM    Patient Admission Status: Inpatient   Readmission Risk (Low < 19, Mod (19-27), High > 27): Readmission Risk Score: 27.9    Current PCP: Arielle Melton APRN - NP  PCP verified by CM? Yes    Chart Reviewed: Yes      History Provided by: Patient  Patient Orientation: Alert and Oriented    Patient Cognition: Alert    Hospitalization in the last 30 days (Readmission):  No    If yes, Readmission Assessment in  Navigator will be completed.    Advance Directives:      Code Status: Full Code   Patient's Primary Decision Maker is: Legal Next of Kin    Primary Decision Maker: Naima Grant - Parent - [phone]    Secondary Decision Maker: JuanitaZachery - Brother/Sister - [phone]    Discharge Planning:    Patient lives with: Alone Type of Home: Trailer/Mobile Home  Primary Care Giver: Self  Patient Support Systems include: Parent, Family Members   Current Financial resources: Medicare  Current community resources: ECF/Home Care (Current w/ [facility])  Current services prior to admission: Durable Medical Equipment            Current DME: Shower Chair, Walker, Wheelchair, Bedside Commode, Glucometer (GB)            Type of Home Care services:  OT, PT, Nursing Services    ADLS  Prior functional level: Assistance with the following:, Bathing, Dressing, Cooking, Housework, Shopping  Current functional level: Assistance with the following:, Bathing, Dressing, Cooking, Housework, Shopping    PT AM-PAC:

## 2025-02-17 ENCOUNTER — APPOINTMENT (OUTPATIENT)
Dept: INTERVENTIONAL RADIOLOGY/VASCULAR | Age: 45
DRG: 920 | End: 2025-02-17
Payer: COMMERCIAL

## 2025-02-17 LAB
ANION GAP SERPL CALCULATED.3IONS-SCNC: 9 MMOL/L (ref 9–16)
BASOPHILS # BLD: 0.06 K/UL (ref 0–0.2)
BASOPHILS NFR BLD: 1 % (ref 0–2)
BUN SERPL-MCNC: 9 MG/DL (ref 6–20)
CALCIUM SERPL-MCNC: 8.1 MG/DL (ref 8.6–10.4)
CHLORIDE SERPL-SCNC: 105 MMOL/L (ref 98–107)
CO2 SERPL-SCNC: 22 MMOL/L (ref 20–31)
CREAT SERPL-MCNC: 0.5 MG/DL (ref 0.7–1.2)
CRP SERPL HS-MCNC: 18.5 MG/L (ref 0–5)
DATE LAST DOSE: NORMAL
EKG ATRIAL RATE: 76 BPM
EKG P AXIS: 36 DEGREES
EKG P-R INTERVAL: 124 MS
EKG Q-T INTERVAL: 406 MS
EKG QRS DURATION: 88 MS
EKG QTC CALCULATION (BAZETT): 456 MS
EKG R AXIS: 51 DEGREES
EKG T AXIS: 39 DEGREES
EKG VENTRICULAR RATE: 76 BPM
EOSINOPHIL # BLD: 0.12 K/UL (ref 0–0.4)
EOSINOPHILS RELATIVE PERCENT: 2 % (ref 0–4)
ERYTHROCYTE [DISTWIDTH] IN BLOOD BY AUTOMATED COUNT: 21.4 % (ref 11.5–14.9)
ERYTHROCYTE [SEDIMENTATION RATE] IN BLOOD BY PHOTOMETRIC METHOD: 17 MM/HR (ref 0–20)
GFR, ESTIMATED: >90 ML/MIN/1.73M2
GLUCOSE BLD-MCNC: 151 MG/DL (ref 65–105)
GLUCOSE BLD-MCNC: 207 MG/DL (ref 65–105)
GLUCOSE BLD-MCNC: 71 MG/DL (ref 65–105)
GLUCOSE BLD-MCNC: 98 MG/DL (ref 65–105)
GLUCOSE SERPL-MCNC: 91 MG/DL (ref 74–99)
HCT VFR BLD AUTO: 35.8 % (ref 36–46)
HGB BLD-MCNC: 11.4 G/DL (ref 12–16)
LYMPHOCYTES NFR BLD: 1.28 K/UL (ref 1–4.8)
LYMPHOCYTES RELATIVE PERCENT: 21 % (ref 24–44)
MCH RBC QN AUTO: 27.2 PG (ref 26–34)
MCHC RBC AUTO-ENTMCNC: 31.9 G/DL (ref 31–37)
MCV RBC AUTO: 85.3 FL (ref 80–100)
MONOCYTES NFR BLD: 0.49 K/UL (ref 0.1–1.3)
MONOCYTES NFR BLD: 8 % (ref 1–7)
MORPHOLOGY: ABNORMAL
MORPHOLOGY: ABNORMAL
NEUTROPHILS NFR BLD: 68 % (ref 36–66)
NEUTS SEG NFR BLD: 4.15 K/UL (ref 1.3–9.1)
PLATELET # BLD AUTO: 256 K/UL (ref 150–450)
PMV BLD AUTO: 9.5 FL (ref 6–12)
POTASSIUM SERPL-SCNC: 4.5 MMOL/L (ref 3.7–5.3)
RBC # BLD AUTO: 4.19 M/UL (ref 4–5.2)
SODIUM SERPL-SCNC: 136 MMOL/L (ref 136–145)
TME LAST DOSE: 443 H
VANCOMYCIN DOSE: NORMAL MG
VANCOMYCIN SERPL-MCNC: 17.3 UG/ML (ref 5–40)
WBC OTHER # BLD: 6.1 K/UL (ref 3.5–11)

## 2025-02-17 PROCEDURE — 85025 COMPLETE CBC W/AUTO DIFF WBC: CPT

## 2025-02-17 PROCEDURE — 82947 ASSAY GLUCOSE BLOOD QUANT: CPT

## 2025-02-17 PROCEDURE — 2500000003 HC RX 250 WO HCPCS: Performed by: SURGERY

## 2025-02-17 PROCEDURE — 97530 THERAPEUTIC ACTIVITIES: CPT

## 2025-02-17 PROCEDURE — 6370000000 HC RX 637 (ALT 250 FOR IP)

## 2025-02-17 PROCEDURE — 97535 SELF CARE MNGMENT TRAINING: CPT

## 2025-02-17 PROCEDURE — 05H933Z INSERTION OF INFUSION DEVICE INTO RIGHT BRACHIAL VEIN, PERCUTANEOUS APPROACH: ICD-10-PCS | Performed by: INTERNAL MEDICINE

## 2025-02-17 PROCEDURE — 6360000002 HC RX W HCPCS

## 2025-02-17 PROCEDURE — 1200000000 HC SEMI PRIVATE

## 2025-02-17 PROCEDURE — 97162 PT EVAL MOD COMPLEX 30 MIN: CPT

## 2025-02-17 PROCEDURE — 36415 COLL VENOUS BLD VENIPUNCTURE: CPT

## 2025-02-17 PROCEDURE — 85652 RBC SED RATE AUTOMATED: CPT

## 2025-02-17 PROCEDURE — 80048 BASIC METABOLIC PNL TOTAL CA: CPT

## 2025-02-17 PROCEDURE — 97166 OT EVAL MOD COMPLEX 45 MIN: CPT

## 2025-02-17 PROCEDURE — 36573 INSJ PICC RS&I 5 YR+: CPT

## 2025-02-17 PROCEDURE — 6370000000 HC RX 637 (ALT 250 FOR IP): Performed by: SURGERY

## 2025-02-17 PROCEDURE — 80202 ASSAY OF VANCOMYCIN: CPT

## 2025-02-17 PROCEDURE — 99223 1ST HOSP IP/OBS HIGH 75: CPT | Performed by: INTERNAL MEDICINE

## 2025-02-17 PROCEDURE — 2500000003 HC RX 250 WO HCPCS

## 2025-02-17 PROCEDURE — 86140 C-REACTIVE PROTEIN: CPT

## 2025-02-17 PROCEDURE — 2580000003 HC RX 258

## 2025-02-17 PROCEDURE — 97116 GAIT TRAINING THERAPY: CPT

## 2025-02-17 PROCEDURE — C1751 CATH, INF, PER/CENT/MIDLINE: HCPCS

## 2025-02-17 PROCEDURE — 99239 HOSP IP/OBS DSCHRG MGMT >30: CPT | Performed by: INTERNAL MEDICINE

## 2025-02-17 RX ORDER — M-VIT,TX,IRON,MINS/CALC/FOLIC 27MG-0.4MG
1 TABLET ORAL DAILY
Status: DISCONTINUED | OUTPATIENT
Start: 2025-02-17 | End: 2025-02-20 | Stop reason: HOSPADM

## 2025-02-17 RX ORDER — ZINC SULFATE 50(220)MG
50 CAPSULE ORAL DAILY
Status: DISCONTINUED | OUTPATIENT
Start: 2025-02-17 | End: 2025-02-20 | Stop reason: HOSPADM

## 2025-02-17 RX ORDER — SILVER SULFADIAZINE 10 MG/G
CREAM TOPICAL 2 TIMES DAILY
Status: DISCONTINUED | OUTPATIENT
Start: 2025-02-17 | End: 2025-02-20 | Stop reason: HOSPADM

## 2025-02-17 RX ORDER — LORAZEPAM 2 MG/ML
1 INJECTION INTRAMUSCULAR
Status: ACTIVE | OUTPATIENT
Start: 2025-02-17 | End: 2025-02-18

## 2025-02-17 RX ORDER — ASCORBIC ACID 500 MG
500 TABLET ORAL DAILY
Status: DISCONTINUED | OUTPATIENT
Start: 2025-02-17 | End: 2025-02-20 | Stop reason: HOSPADM

## 2025-02-17 RX ORDER — INSULIN GLARGINE 100 [IU]/ML
27 INJECTION, SOLUTION SUBCUTANEOUS NIGHTLY
Status: DISCONTINUED | OUTPATIENT
Start: 2025-02-17 | End: 2025-02-20 | Stop reason: HOSPADM

## 2025-02-17 RX ADMIN — SUCRALFATE 1 G: 1 TABLET ORAL at 10:32

## 2025-02-17 RX ADMIN — MEROPENEM 1000 MG: 1 INJECTION INTRAVENOUS at 17:24

## 2025-02-17 RX ADMIN — INSULIN GLARGINE 27 UNITS: 100 INJECTION, SOLUTION SUBCUTANEOUS at 21:39

## 2025-02-17 RX ADMIN — OXYCODONE HYDROCHLORIDE AND ACETAMINOPHEN 500 MG: 500 TABLET ORAL at 09:06

## 2025-02-17 RX ADMIN — DILTIAZEM HYDROCHLORIDE 120 MG: 120 CAPSULE, COATED, EXTENDED RELEASE ORAL at 09:06

## 2025-02-17 RX ADMIN — MORPHINE SULFATE 1 MG: 2 INJECTION, SOLUTION INTRAMUSCULAR; INTRAVENOUS at 05:31

## 2025-02-17 RX ADMIN — VANCOMYCIN 1250 MG: 1.75 INJECTION, SOLUTION INTRAVENOUS at 04:43

## 2025-02-17 RX ADMIN — PIPERACILLIN AND TAZOBACTAM 3375 MG: 3; .375 INJECTION, POWDER, LYOPHILIZED, FOR SOLUTION INTRAVENOUS at 09:15

## 2025-02-17 RX ADMIN — MORPHINE SULFATE 1 MG: 2 INJECTION, SOLUTION INTRAMUSCULAR; INTRAVENOUS at 21:39

## 2025-02-17 RX ADMIN — INSULIN LISPRO 1 UNITS: 100 INJECTION, SOLUTION INTRAVENOUS; SUBCUTANEOUS at 17:15

## 2025-02-17 RX ADMIN — SILVER SULFADIAZINE: 10 CREAM TOPICAL at 10:33

## 2025-02-17 RX ADMIN — LORAZEPAM 1 MG: 1 TABLET ORAL at 10:58

## 2025-02-17 RX ADMIN — MORPHINE SULFATE 1 MG: 2 INJECTION, SOLUTION INTRAMUSCULAR; INTRAVENOUS at 13:48

## 2025-02-17 RX ADMIN — PIPERACILLIN AND TAZOBACTAM 3375 MG: 3; .375 INJECTION, POWDER, LYOPHILIZED, FOR SOLUTION INTRAVENOUS at 00:07

## 2025-02-17 RX ADMIN — FONDAPARINUX SODIUM 7.5 MG: 7.5 INJECTION SUBCUTANEOUS at 09:07

## 2025-02-17 RX ADMIN — OXYCODONE HYDROCHLORIDE AND ACETAMINOPHEN 1 TABLET: 5; 325 TABLET ORAL at 19:29

## 2025-02-17 RX ADMIN — SUCRALFATE 1 G: 1 TABLET ORAL at 21:39

## 2025-02-17 RX ADMIN — SUCRALFATE 1 G: 1 TABLET ORAL at 15:08

## 2025-02-17 RX ADMIN — OXYCODONE HYDROCHLORIDE AND ACETAMINOPHEN 1 TABLET: 5; 325 TABLET ORAL at 15:10

## 2025-02-17 RX ADMIN — Medication 50 MG: at 10:32

## 2025-02-17 RX ADMIN — BUPROPION HYDROCHLORIDE 150 MG: 150 TABLET, EXTENDED RELEASE ORAL at 09:06

## 2025-02-17 RX ADMIN — OXYCODONE HYDROCHLORIDE 2.5 MG: 5 TABLET ORAL at 10:58

## 2025-02-17 RX ADMIN — OXYCODONE HYDROCHLORIDE AND ACETAMINOPHEN 1 TABLET: 5; 325 TABLET ORAL at 09:03

## 2025-02-17 RX ADMIN — PANTOPRAZOLE SODIUM 40 MG: 40 TABLET, DELAYED RELEASE ORAL at 05:31

## 2025-02-17 RX ADMIN — SUCRALFATE 1 G: 1 TABLET ORAL at 17:14

## 2025-02-17 RX ADMIN — SODIUM CHLORIDE, PRESERVATIVE FREE 10 ML: 5 INJECTION INTRAVENOUS at 21:39

## 2025-02-17 RX ADMIN — FLUOXETINE HYDROCHLORIDE 40 MG: 20 CAPSULE ORAL at 09:06

## 2025-02-17 RX ADMIN — OXYCODONE HYDROCHLORIDE AND ACETAMINOPHEN 1 TABLET: 5; 325 TABLET ORAL at 04:25

## 2025-02-17 RX ADMIN — Medication 1 TABLET: at 09:06

## 2025-02-17 ASSESSMENT — ENCOUNTER SYMPTOMS
EYE PAIN: 0
CHEST TIGHTNESS: 0
CHOKING: 0
ABDOMINAL PAIN: 1
ABDOMINAL DISTENTION: 0
APNEA: 0
ABDOMINAL PAIN: 0
PHOTOPHOBIA: 0
COLOR CHANGE: 0
DIARRHEA: 0
SHORTNESS OF BREATH: 0
EYE DISCHARGE: 0

## 2025-02-17 ASSESSMENT — PAIN DESCRIPTION - INTENSITY: RATING_2: 7

## 2025-02-17 ASSESSMENT — PAIN DESCRIPTION - LOCATION
LOCATION: LEG;ABDOMEN
LOCATION_2: LEG
LOCATION: HIP;ABDOMEN
LOCATION: HIP
LOCATION: LEG;HIP
LOCATION: OTHER (COMMENT)
LOCATION: LEG;HIP
LOCATION: HIP
LOCATION: HIP

## 2025-02-17 ASSESSMENT — PAIN DESCRIPTION - ORIENTATION
ORIENTATION: LEFT
ORIENTATION: RIGHT;LEFT
ORIENTATION: LEFT
ORIENTATION: RIGHT;LEFT
ORIENTATION_2: RIGHT
ORIENTATION: RIGHT;LEFT

## 2025-02-17 ASSESSMENT — PAIN SCALES - GENERAL
PAINLEVEL_OUTOF10: 7
PAINLEVEL_OUTOF10: 6
PAINLEVEL_OUTOF10: 7
PAINLEVEL_OUTOF10: 4
PAINLEVEL_OUTOF10: 5
PAINLEVEL_OUTOF10: 0
PAINLEVEL_OUTOF10: 7
PAINLEVEL_OUTOF10: 3
PAINLEVEL_OUTOF10: 6
PAINLEVEL_OUTOF10: 7
PAINLEVEL_OUTOF10: 5
PAINLEVEL_OUTOF10: 7
PAINLEVEL_OUTOF10: 0

## 2025-02-17 ASSESSMENT — PAIN - FUNCTIONAL ASSESSMENT
PAIN_FUNCTIONAL_ASSESSMENT: PREVENTS OR INTERFERES SOME ACTIVE ACTIVITIES AND ADLS
PAIN_FUNCTIONAL_ASSESSMENT_SITE2: PREVENTS OR INTERFERES SOME ACTIVE ACTIVITIES AND ADLS

## 2025-02-17 ASSESSMENT — PAIN DESCRIPTION - DESCRIPTORS
DESCRIPTORS_2: BURNING
DESCRIPTORS: ACHING;SHARP
DESCRIPTORS: SHARP
DESCRIPTORS: SHARP;ACHING

## 2025-02-17 ASSESSMENT — PAIN DESCRIPTION - FREQUENCY: FREQUENCY: CONTINUOUS

## 2025-02-17 ASSESSMENT — PAIN DESCRIPTION - ONSET: ONSET: ON-GOING

## 2025-02-17 ASSESSMENT — PAIN DESCRIPTION - PAIN TYPE: TYPE: CHRONIC PAIN

## 2025-02-17 NOTE — DISCHARGE INSTR - COC
Continuity of Care Form    Patient Name: Krista Chiang   :  1980  MRN:  [id number]    Admit date:  2/15/2025  Discharge date:  25    Code Status Order: Full Code   Advance Directives:   Advance Care Flowsheet Documentation             Admitting Physician:  Crystal Estrada MD  PCP: Arielle Melton, APRN - NP    Discharging Nurse: PEBBLES Morgan  Discharging Hospital Unit/Room#:   Discharging Unit Phone Number: [phone]    Emergency Contact:   Extended Emergency Contact Information  Primary Emergency Contact: Naima Grant  Address: [address] [place] [address]  Home Phone: [phone]  Mobile Phone: [phone]  Relation: Parent  Secondary Emergency Contact: Zachery Grant  Home Phone: [phone]  Mobile Phone: [phone]  Relation: Brother/Sister    Past Surgical History:  Past Surgical History:   Procedure Laterality Date    ABDOMINAL HERNIA REPAIR      incisional hernia repair, complicated by infection, had multiple surgeries for this    ABDOMINAL HERNIA REPAIR  2014    ANKLE FRACTURE SURGERY Left     2023  HIP IRRIGATION AND DEBRIDEMENT AND PLACEMENT OF ANTIBIOTIC IMPREGNATED CEMENT BEADS performed by Dipak Traore DO at [facility] OR    ANKLE FRACTURE SURGERY Left 10/24/2023    IRRIGATION AND DEBRIDEMENT OF LEFT HIP WITH CLOSURE performed by Dipak Traore DO at [facility] OR    ANKLE SURGERY Left 2019    ligament    ANKLE SURGERY Left 2023    IRRIGATION AND DEBRIDEMENT HIP (IRRISEPT, CELLERATE) performed by Dipak Traore DO at [facility] OR    BARIATRIC SURGERY  2013    [facility] : [facility]     SECTION      CHOLECYSTECTOMY      DILATION AND CURETTAGE OF UTERUS  2005    ESOPHAGOGASTRODUODENOSCOPY  2021    ESOPHAGOGASTRODUODENOSCOPY  2013    FINGER AMPUTATION Left 2015    index and ring finger. from [facility] CATH POWER PICC SINGLE  2023    HIP SURGERY Left

## 2025-02-17 NOTE — CARE COORDINATION
DISCHARGE PLANNING NOTE:    LSW following for potential discharge to SNF. Patient has been accepted at [facility] per Helene in admissions. Awaiting PT/OT evals before insurance authorization can be submitted.     Message to Helene in admissions that therapy evals have been completed.

## 2025-02-17 NOTE — CONSULTS
General Surgery  Consultation        PATIENT NAME: Krista Chiang   YOB: 1980    ADMISSION DATE: 2/15/2025 11:02 AM     Consulting Physician: Dr Alas     TODAY'S DATE: 2/17/2025    Reason for consult: Longstanding wounds left hip, superior and inferior midline abdomen and right inner thigh    Chief complaint: Same    HISTORY OF PRESENT ILLNESS:  The patient is a insulin-dependent diabetic white female 44 y.o. female  who presents with recent positive wound culture for MRSA and scattered longstanding wounds on her right inner thigh, 2 on her mid abdomen and some nonhealing along her incision from her left hip replacement hardware removal and excision on her left lateral thigh.  General surgery consulted regarding these wounds.  Patient has had a long history of wound closure and muscular coverage of bone and wound in her left hip after bone removal and hardware removal due to infection from MRSA and MDRO.  Patient is status post partial hepatectomy for liver adenoma and has a history of cirrhosis.  Patient has numerous other significant life-threatening comorbidities.  General surgery is consulted regarding patient's wounds.  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

## 2025-02-17 NOTE — CONSULTS
[facility] -   Infectious diseases evaluation  admission date 2/15/2025    reason for consultation:   UTI    Impression :   Current:  UTI /ESBL producing E. coli growth on urine culture  Right upper thigh/abdominal wounds infection  Left hip wounds ,no signs of  infection currently  Suspected chronic osteomyelitis  S/P left hip hemiarthroplasty performed by Dr. Traore on 7/19/2023 after sustaining a femoral neck fracture.    S/P left hip I&D /placement of antibiotic cement beads on 8/21/2023 due to infection post hemiarthroplasty.  The patient was treated with Avycaz due to resistant Klebsiella on culture.S/P left hip Girdlestone at [facility].    DM  Alcohol abuse   Antiphospholipid syndrome   H/O bariatric surgery   H/O PE/DVT status post IVC filter  H/O MRSA/MDRO/VRE/Acinetobacter  History of C. difficile colitis      HENCE:   D/c vancomycin and Zosyn  IV meropenem through 2/24/2025  Follow wound cultures  Wound care  No objection for discharge from infectious disease point of view  Follow-up with me at wound care clinic next week    Infection Control Recommendations   [place] Precautions  Contact Isolation       Antimicrobial Stewardship Recommendations   Simplification of therapy  Targeted therapy      History of Present Illness:   Initial history:  Krista Chiang is a 44 y.o.-year-old female presented to hospital for multiple wounds, dysuria, burning with urination, urgency and not feeling well.  Initial WBC 5.2, creatinine 0.6  Urine culture grew ESBL producing E. coli  She had wounds to the left hip with no bone exposure, no necrotic tissue or purulent drainage, no surrounding redness.  The patient also had abdominal wall and right thigh wound with purulent drainage and surrounding redness.  S/P left hip hemiarthroplasty performed by Dr. Traoer on 7/19/2023 after sustaining a femoral neck fracture.    S/P left hip I&D /placement of antibiotic cement beads on

## 2025-02-18 PROBLEM — L98.492 SKIN ULCER OF ABDOMEN WITH FAT LAYER EXPOSED (HCC): Status: ACTIVE | Noted: 2025-02-18

## 2025-02-18 PROBLEM — T14.8XXA WOUND INFECTION: Status: ACTIVE | Noted: 2025-02-18

## 2025-02-18 PROBLEM — T14.8XXA CHRONIC WOUND: Status: ACTIVE | Noted: 2025-02-18

## 2025-02-18 PROBLEM — L08.9 WOUND INFECTION: Status: ACTIVE | Noted: 2025-02-18

## 2025-02-18 PROBLEM — T81.31XA RUPTURE OF OPERATION WOUND: Status: ACTIVE | Noted: 2025-02-18

## 2025-02-18 PROBLEM — L97.112: Status: ACTIVE | Noted: 2025-02-18

## 2025-02-18 LAB
ANION GAP SERPL CALCULATED.3IONS-SCNC: 9 MMOL/L (ref 9–16)
BASOPHILS # BLD: 0 K/UL (ref 0–0.2)
BASOPHILS NFR BLD: 0 % (ref 0–2)
BUN SERPL-MCNC: 9 MG/DL (ref 6–20)
CALCIUM SERPL-MCNC: 8.5 MG/DL (ref 8.6–10.4)
CHLORIDE SERPL-SCNC: 106 MMOL/L (ref 98–107)
CO2 SERPL-SCNC: 23 MMOL/L (ref 20–31)
CREAT SERPL-MCNC: 0.5 MG/DL (ref 0.7–1.2)
CRP SERPL HS-MCNC: 20.5 MG/L (ref 0–5)
EOSINOPHIL # BLD: 0.13 K/UL (ref 0–0.4)
EOSINOPHILS RELATIVE PERCENT: 2 % (ref 0–4)
ERYTHROCYTE [DISTWIDTH] IN BLOOD BY AUTOMATED COUNT: 21.4 % (ref 11.5–14.9)
ERYTHROCYTE [SEDIMENTATION RATE] IN BLOOD BY PHOTOMETRIC METHOD: 23 MM/HR (ref 0–20)
GFR, ESTIMATED: >90 ML/MIN/1.73M2
GLUCOSE BLD-MCNC: 118 MG/DL (ref 65–105)
GLUCOSE BLD-MCNC: 135 MG/DL (ref 65–105)
GLUCOSE BLD-MCNC: 145 MG/DL (ref 65–105)
GLUCOSE BLD-MCNC: 222 MG/DL (ref 65–105)
GLUCOSE BLD-MCNC: 57 MG/DL (ref 65–105)
GLUCOSE SERPL-MCNC: 94 MG/DL (ref 74–99)
HCT VFR BLD AUTO: 39.3 % (ref 36–46)
HGB BLD-MCNC: 12.4 G/DL (ref 12–16)
LYMPHOCYTES NFR BLD: 1.27 K/UL (ref 1–4.8)
LYMPHOCYTES RELATIVE PERCENT: 19 % (ref 24–44)
MCH RBC QN AUTO: 27.4 PG (ref 26–34)
MCHC RBC AUTO-ENTMCNC: 31.4 G/DL (ref 31–37)
MCV RBC AUTO: 87.3 FL (ref 80–100)
MICROORGANISM SPEC CULT: ABNORMAL
MONOCYTES NFR BLD: 0.47 K/UL (ref 0.1–1.3)
MONOCYTES NFR BLD: 7 % (ref 1–7)
MORPHOLOGY: ABNORMAL
NEUTROPHILS NFR BLD: 72 % (ref 36–66)
NEUTS SEG NFR BLD: 4.83 K/UL (ref 1.3–9.1)
PLATELET # BLD AUTO: 306 K/UL (ref 150–450)
PMV BLD AUTO: 9.1 FL (ref 6–12)
POTASSIUM SERPL-SCNC: 4.6 MMOL/L (ref 3.7–5.3)
RBC # BLD AUTO: 4.5 M/UL (ref 4–5.2)
SERVICE CMNT-IMP: ABNORMAL
SODIUM SERPL-SCNC: 138 MMOL/L (ref 136–145)
SPECIMEN DESCRIPTION: ABNORMAL
WBC OTHER # BLD: 6.7 K/UL (ref 3.5–11)

## 2025-02-18 PROCEDURE — 97530 THERAPEUTIC ACTIVITIES: CPT

## 2025-02-18 PROCEDURE — 6370000000 HC RX 637 (ALT 250 FOR IP): Performed by: NURSE PRACTITIONER

## 2025-02-18 PROCEDURE — 82947 ASSAY GLUCOSE BLOOD QUANT: CPT

## 2025-02-18 PROCEDURE — 2500000003 HC RX 250 WO HCPCS

## 2025-02-18 PROCEDURE — 85652 RBC SED RATE AUTOMATED: CPT

## 2025-02-18 PROCEDURE — 2580000003 HC RX 258

## 2025-02-18 PROCEDURE — 80048 BASIC METABOLIC PNL TOTAL CA: CPT

## 2025-02-18 PROCEDURE — 99232 SBSQ HOSP IP/OBS MODERATE 35: CPT | Performed by: INTERNAL MEDICINE

## 2025-02-18 PROCEDURE — 97116 GAIT TRAINING THERAPY: CPT

## 2025-02-18 PROCEDURE — 99233 SBSQ HOSP IP/OBS HIGH 50: CPT | Performed by: INTERNAL MEDICINE

## 2025-02-18 PROCEDURE — 1200000000 HC SEMI PRIVATE

## 2025-02-18 PROCEDURE — 6370000000 HC RX 637 (ALT 250 FOR IP)

## 2025-02-18 PROCEDURE — 6360000002 HC RX W HCPCS

## 2025-02-18 PROCEDURE — 99232 SBSQ HOSP IP/OBS MODERATE 35: CPT | Performed by: NURSE PRACTITIONER

## 2025-02-18 PROCEDURE — 97110 THERAPEUTIC EXERCISES: CPT

## 2025-02-18 PROCEDURE — 6370000000 HC RX 637 (ALT 250 FOR IP): Performed by: SURGERY

## 2025-02-18 PROCEDURE — 86140 C-REACTIVE PROTEIN: CPT

## 2025-02-18 PROCEDURE — 85025 COMPLETE CBC W/AUTO DIFF WBC: CPT

## 2025-02-18 PROCEDURE — 36415 COLL VENOUS BLD VENIPUNCTURE: CPT

## 2025-02-18 PROCEDURE — 2500000003 HC RX 250 WO HCPCS: Performed by: SURGERY

## 2025-02-18 RX ORDER — BUMETANIDE 2 MG/1
2 TABLET ORAL DAILY PRN
Qty: 30 TABLET | Refills: 3 | Status: SHIPPED | OUTPATIENT
Start: 2025-02-18

## 2025-02-18 RX ORDER — LORAZEPAM 1 MG/1
1 TABLET ORAL ONCE
Status: COMPLETED | OUTPATIENT
Start: 2025-02-18 | End: 2025-02-18

## 2025-02-18 RX ORDER — ZINC SULFATE 50(220)MG
50 CAPSULE ORAL DAILY
COMMUNITY
Start: 2025-02-19

## 2025-02-18 RX ORDER — M-VIT,TX,IRON,MINS/CALC/FOLIC 27MG-0.4MG
1 TABLET ORAL DAILY
Qty: 30 TABLET | Refills: 2 | Status: SHIPPED | OUTPATIENT
Start: 2025-02-19 | End: 2025-05-20

## 2025-02-18 RX ORDER — ASCORBIC ACID 500 MG
500 TABLET ORAL DAILY
Qty: 30 TABLET | Refills: 3 | Status: SHIPPED | OUTPATIENT
Start: 2025-02-19

## 2025-02-18 RX ORDER — OXYCODONE AND ACETAMINOPHEN 5; 325 MG/1; MG/1
1 TABLET ORAL EVERY 4 HOURS PRN
Qty: 18 TABLET | Refills: 0 | Status: SHIPPED | OUTPATIENT
Start: 2025-02-18 | End: 2025-02-20 | Stop reason: HOSPADM

## 2025-02-18 RX ORDER — OXYCODONE HYDROCHLORIDE 5 MG/1
2.5 TABLET ORAL EVERY 4 HOURS PRN
Qty: 9 TABLET | Refills: 0 | Status: SHIPPED | OUTPATIENT
Start: 2025-02-18 | End: 2025-02-24

## 2025-02-18 RX ADMIN — MEROPENEM 1000 MG: 1 INJECTION INTRAVENOUS at 16:06

## 2025-02-18 RX ADMIN — Medication 50 MG: at 07:21

## 2025-02-18 RX ADMIN — SILVER SULFADIAZINE: 10 CREAM TOPICAL at 22:01

## 2025-02-18 RX ADMIN — SUCRALFATE 1 G: 1 TABLET ORAL at 11:52

## 2025-02-18 RX ADMIN — OXYCODONE HYDROCHLORIDE AND ACETAMINOPHEN 1 TABLET: 5; 325 TABLET ORAL at 15:59

## 2025-02-18 RX ADMIN — MORPHINE SULFATE 1 MG: 2 INJECTION, SOLUTION INTRAMUSCULAR; INTRAVENOUS at 23:19

## 2025-02-18 RX ADMIN — OXYCODONE HYDROCHLORIDE 2.5 MG: 5 TABLET ORAL at 00:30

## 2025-02-18 RX ADMIN — OXYCODONE HYDROCHLORIDE 2.5 MG: 5 TABLET ORAL at 22:01

## 2025-02-18 RX ADMIN — MEROPENEM 1000 MG: 1 INJECTION INTRAVENOUS at 00:37

## 2025-02-18 RX ADMIN — SODIUM CHLORIDE, PRESERVATIVE FREE 10 ML: 5 INJECTION INTRAVENOUS at 07:25

## 2025-02-18 RX ADMIN — SODIUM CHLORIDE, PRESERVATIVE FREE 10 ML: 5 INJECTION INTRAVENOUS at 22:02

## 2025-02-18 RX ADMIN — FLUOXETINE HYDROCHLORIDE 40 MG: 20 CAPSULE ORAL at 07:22

## 2025-02-18 RX ADMIN — OXYCODONE HYDROCHLORIDE 2.5 MG: 5 TABLET ORAL at 07:19

## 2025-02-18 RX ADMIN — DILTIAZEM HYDROCHLORIDE 120 MG: 120 CAPSULE, COATED, EXTENDED RELEASE ORAL at 07:22

## 2025-02-18 RX ADMIN — PANTOPRAZOLE SODIUM 40 MG: 40 TABLET, DELAYED RELEASE ORAL at 06:34

## 2025-02-18 RX ADMIN — MEROPENEM 1000 MG: 1 INJECTION INTRAVENOUS at 08:58

## 2025-02-18 RX ADMIN — INSULIN LISPRO 1 UNITS: 100 INJECTION, SOLUTION INTRAVENOUS; SUBCUTANEOUS at 11:58

## 2025-02-18 RX ADMIN — LORAZEPAM 1 MG: 1 TABLET ORAL at 11:58

## 2025-02-18 RX ADMIN — LORAZEPAM 1 MG: 1 TABLET ORAL at 23:50

## 2025-02-18 RX ADMIN — OXYCODONE HYDROCHLORIDE AND ACETAMINOPHEN 500 MG: 500 TABLET ORAL at 07:22

## 2025-02-18 RX ADMIN — MORPHINE SULFATE 1 MG: 2 INJECTION, SOLUTION INTRAMUSCULAR; INTRAVENOUS at 14:19

## 2025-02-18 RX ADMIN — OXYCODONE HYDROCHLORIDE 2.5 MG: 5 TABLET ORAL at 15:59

## 2025-02-18 RX ADMIN — BUPROPION HYDROCHLORIDE 150 MG: 150 TABLET, EXTENDED RELEASE ORAL at 07:22

## 2025-02-18 RX ADMIN — FONDAPARINUX SODIUM 7.5 MG: 7.5 INJECTION SUBCUTANEOUS at 10:24

## 2025-02-18 RX ADMIN — SUCRALFATE 1 G: 1 TABLET ORAL at 07:22

## 2025-02-18 RX ADMIN — LORAZEPAM 1 MG: 1 TABLET ORAL at 00:39

## 2025-02-18 RX ADMIN — OXYCODONE HYDROCHLORIDE 2.5 MG: 5 TABLET ORAL at 11:52

## 2025-02-18 RX ADMIN — OXYCODONE HYDROCHLORIDE AND ACETAMINOPHEN 1 TABLET: 5; 325 TABLET ORAL at 22:01

## 2025-02-18 RX ADMIN — MORPHINE SULFATE 1 MG: 2 INJECTION, SOLUTION INTRAMUSCULAR; INTRAVENOUS at 06:20

## 2025-02-18 RX ADMIN — SUCRALFATE 1 G: 1 TABLET ORAL at 15:59

## 2025-02-18 RX ADMIN — OXYCODONE HYDROCHLORIDE AND ACETAMINOPHEN 1 TABLET: 5; 325 TABLET ORAL at 07:19

## 2025-02-18 RX ADMIN — ONDANSETRON 4 MG: 4 TABLET, ORALLY DISINTEGRATING ORAL at 08:53

## 2025-02-18 RX ADMIN — OXYCODONE HYDROCHLORIDE AND ACETAMINOPHEN 1 TABLET: 5; 325 TABLET ORAL at 11:52

## 2025-02-18 RX ADMIN — SUCRALFATE 1 G: 1 TABLET ORAL at 22:01

## 2025-02-18 RX ADMIN — Medication 1 TABLET: at 07:22

## 2025-02-18 RX ADMIN — INSULIN GLARGINE 27 UNITS: 100 INJECTION, SOLUTION SUBCUTANEOUS at 22:01

## 2025-02-18 RX ADMIN — OXYCODONE HYDROCHLORIDE AND ACETAMINOPHEN 1 TABLET: 5; 325 TABLET ORAL at 00:30

## 2025-02-18 ASSESSMENT — ENCOUNTER SYMPTOMS
COUGH: 0
SHORTNESS OF BREATH: 0
NAUSEA: 0
VOMITING: 0
RHINORRHEA: 0
ABDOMINAL PAIN: 0
ROS SKIN COMMENTS: SEE HPI.
CHEST TIGHTNESS: 0

## 2025-02-18 ASSESSMENT — PAIN DESCRIPTION - LOCATION
LOCATION: ABDOMEN;LEG
LOCATION: ABDOMEN;LEG
LOCATION: LEG
LOCATION: ABDOMEN;LEG

## 2025-02-18 ASSESSMENT — PAIN SCALES - GENERAL
PAINLEVEL_OUTOF10: 7
PAINLEVEL_OUTOF10: 4
PAINLEVEL_OUTOF10: 4
PAINLEVEL_OUTOF10: 7
PAINLEVEL_OUTOF10: 3
PAINLEVEL_OUTOF10: 7
PAINLEVEL_OUTOF10: 9
PAINLEVEL_OUTOF10: 0
PAINLEVEL_OUTOF10: 10
PAINLEVEL_OUTOF10: 7
PAINLEVEL_OUTOF10: 7
PAINLEVEL_OUTOF10: 4
PAINLEVEL_OUTOF10: 8

## 2025-02-18 ASSESSMENT — PAIN DESCRIPTION - ORIENTATION
ORIENTATION: RIGHT;LEFT
ORIENTATION: RIGHT;LEFT
ORIENTATION: LEFT;RIGHT
ORIENTATION: RIGHT;LEFT

## 2025-02-18 ASSESSMENT — PAIN DESCRIPTION - DESCRIPTORS
DESCRIPTORS: BURNING;SHARP
DESCRIPTORS: SHARP

## 2025-02-18 NOTE — DISCHARGE INSTR - DIET

## 2025-02-18 NOTE — CONSULTS
[facility] -   Infectious diseases evaluation  admission date 2/15/2025    reason for consultation:   UTI     Impression :   Current:  UTI /ESBL producing E. coli growth on urine culture  Right upper thigh, abdominal wound wounds  Left hip wound ,no signs of wound infection currently/Intramedullary bone marrow edema extending from the proximal into the mid left femoral diaphysis nonspecific seen on MRI 12/16/2024 concerning for chronic osteomyelitis  S/P left hip hemiarthroplasty performed by Dr. Traore on 7/19/2023 after sustaining a femoral neck fracture.    S/P left hip I&D /placement of antibiotic cement beads on 8/21/2023 due to infection post hemiarthroplasty.  The patient was treated with Avycaz due to resistant Klebsiella on culture.S/P left hip Girdlestone at [facility].    DM  Alcohol abuse   Antiphospholipid syndrome   H/O bariatric surgery   H/O PE/DVT status post IVC filter  H/O MRSA/MDRO/VRE/Acinetobacter  History of C. difficile colitis    HENCE:       Infection Control Recommendations   [place] Precautions  Contact Isolation   Airborne isolation  Droplet Isolation  Singh discontinuation  Central line discontinuation    Antimicrobial Stewardship Recommendations   Simplification of therapy  Targeted therapy  IV to oral conversion  PK dosing  Restricted antimicrobial use  Discontinuation of therapy    History of Present Illness:   Initial history:  Krista Chiang is a 44 y.o.-year-old female who presented to the OR  She had multiple wounds to the left hip wound with no bone exposure, no necrotic tissue or purulent drainage, no surrounding redness.  Left hip MRI 12/16/2024  IMPRESSION:  1. Exam limited by artifact  2. Large lateral left hip wound with linear edema communicating around the  posterior margin of the proximal femur to the left hip joint.  No discrete  drainable abscess or fluid collection.  3. Intramedullary bone marrow edema extending from the

## 2025-02-18 NOTE — CARE COORDINATION
ONGOING DISCHARGE PLAN:    Patient is alert and oriented x4.    Spoke with patient regarding discharge plan and patient states she really wants to go home. Explained to patient that this will be difficult d/t needing IV Merrem 1gm every 8 hours and her living alone. She wants to talk to her mother and see if she will be able to assist with the antibiotics. Referral sent to [facility].  Updated patricia Garrett on all of this. Will need script for IV Merrem.  Updated Lisha from [facility]'s that pt might up discharging home. Pt state she will go to [facility] if her mother is unable to help her.    Per Helene at [facility], HENS is needed for insurance authorization. HENS completed in case pt ends up going to [facility].  Document ID : [id number]   Informed Helene from [facility] that this was completed.    Will continue to follow for additional discharge needs.    If patient is discharged prior to next notation, then this note serves as note for discharge by case management.    Electronically signed by Helene Vernon RN on 2/18/2025 at 10:13 AM     Went back to speak with patient to see if her mother will be able to assist her with IV atb's.  She states her mom will be calling her back in an hour to discuss this.    Electronically signed by Helene Vernon RN on 2/18/2025 at 1115    Spoke with patient again regarding discharge plan.  Explained to patient that she will discharge on IV Invanz 1gm daily for 6 days. She states she does not think her mother will be able to come over to help daily so she will go to [facility].  Updated Helene from [facility] that pt will d/c on IV Invanz.  Also updated pt's mother Naima of this.  Pt's mother would like notified when pt will be discharged to [facility].    Electronically signed by Helene Vernon RN on 2/18/2025 at 1:13 PM    Spoke with Helene from [facility] who states insurance auth is still pending.    Electronically signed by Helene NAVA

## 2025-02-19 LAB
ANION GAP SERPL CALCULATED.3IONS-SCNC: 8 MMOL/L (ref 9–16)
BASOPHILS # BLD: 0.07 K/UL (ref 0–0.2)
BASOPHILS NFR BLD: 1 % (ref 0–2)
BUN SERPL-MCNC: 7 MG/DL (ref 6–20)
CALCIUM SERPL-MCNC: 7.9 MG/DL (ref 8.6–10.4)
CHLORIDE SERPL-SCNC: 109 MMOL/L (ref 98–107)
CO2 SERPL-SCNC: 23 MMOL/L (ref 20–31)
CREAT SERPL-MCNC: 0.4 MG/DL (ref 0.7–1.2)
CRP SERPL HS-MCNC: 9.7 MG/L (ref 0–5)
EOSINOPHIL # BLD: 0.13 K/UL (ref 0–0.4)
EOSINOPHILS RELATIVE PERCENT: 2 % (ref 0–4)
ERYTHROCYTE [DISTWIDTH] IN BLOOD BY AUTOMATED COUNT: 21.7 % (ref 11.5–14.9)
ERYTHROCYTE [SEDIMENTATION RATE] IN BLOOD BY PHOTOMETRIC METHOD: 17 MM/HR (ref 0–20)
GFR, ESTIMATED: >90 ML/MIN/1.73M2
GLUCOSE BLD-MCNC: 132 MG/DL (ref 65–105)
GLUCOSE BLD-MCNC: 171 MG/DL (ref 65–105)
GLUCOSE BLD-MCNC: 284 MG/DL (ref 65–105)
GLUCOSE BLD-MCNC: 97 MG/DL (ref 65–105)
GLUCOSE SERPL-MCNC: 186 MG/DL (ref 74–99)
HCT VFR BLD AUTO: 35.8 % (ref 36–46)
HGB BLD-MCNC: 11 G/DL (ref 12–16)
LYMPHOCYTES NFR BLD: 1.52 K/UL (ref 1–4.8)
LYMPHOCYTES RELATIVE PERCENT: 23 % (ref 24–44)
MCH RBC QN AUTO: 27.3 PG (ref 26–34)
MCHC RBC AUTO-ENTMCNC: 30.9 G/DL (ref 31–37)
MCV RBC AUTO: 88.3 FL (ref 80–100)
MONOCYTES NFR BLD: 0.4 K/UL (ref 0.1–1.3)
MONOCYTES NFR BLD: 6 % (ref 1–7)
MORPHOLOGY: ABNORMAL
NEUTROPHILS NFR BLD: 68 % (ref 36–66)
NEUTS SEG NFR BLD: 4.48 K/UL (ref 1.3–9.1)
PLATELET # BLD AUTO: 329 K/UL (ref 150–450)
PMV BLD AUTO: 9.1 FL (ref 6–12)
POTASSIUM SERPL-SCNC: 4.3 MMOL/L (ref 3.7–5.3)
RBC # BLD AUTO: 4.05 M/UL (ref 4–5.2)
SODIUM SERPL-SCNC: 140 MMOL/L (ref 136–145)
WBC OTHER # BLD: 6.6 K/UL (ref 3.5–11)

## 2025-02-19 PROCEDURE — 6370000000 HC RX 637 (ALT 250 FOR IP): Performed by: SURGERY

## 2025-02-19 PROCEDURE — 6360000002 HC RX W HCPCS

## 2025-02-19 PROCEDURE — 99232 SBSQ HOSP IP/OBS MODERATE 35: CPT | Performed by: NURSE PRACTITIONER

## 2025-02-19 PROCEDURE — 6370000000 HC RX 637 (ALT 250 FOR IP)

## 2025-02-19 PROCEDURE — 36415 COLL VENOUS BLD VENIPUNCTURE: CPT

## 2025-02-19 PROCEDURE — 6370000000 HC RX 637 (ALT 250 FOR IP): Performed by: INTERNAL MEDICINE

## 2025-02-19 PROCEDURE — 2580000003 HC RX 258

## 2025-02-19 PROCEDURE — 82947 ASSAY GLUCOSE BLOOD QUANT: CPT

## 2025-02-19 PROCEDURE — 1200000000 HC SEMI PRIVATE

## 2025-02-19 PROCEDURE — 85025 COMPLETE CBC W/AUTO DIFF WBC: CPT

## 2025-02-19 PROCEDURE — 80048 BASIC METABOLIC PNL TOTAL CA: CPT

## 2025-02-19 PROCEDURE — 99233 SBSQ HOSP IP/OBS HIGH 50: CPT | Performed by: INTERNAL MEDICINE

## 2025-02-19 PROCEDURE — 86140 C-REACTIVE PROTEIN: CPT

## 2025-02-19 PROCEDURE — 2500000003 HC RX 250 WO HCPCS

## 2025-02-19 PROCEDURE — 85652 RBC SED RATE AUTOMATED: CPT

## 2025-02-19 RX ORDER — METRONIDAZOLE 500 MG/1
500 TABLET ORAL EVERY 8 HOURS SCHEDULED
Status: DISCONTINUED | OUTPATIENT
Start: 2025-02-19 | End: 2025-02-20 | Stop reason: HOSPADM

## 2025-02-19 RX ORDER — LOPERAMIDE HYDROCHLORIDE 2 MG/1
2 CAPSULE ORAL 4 TIMES DAILY PRN
Status: DISCONTINUED | OUTPATIENT
Start: 2025-02-19 | End: 2025-02-20 | Stop reason: HOSPADM

## 2025-02-19 RX ORDER — METRONIDAZOLE 500 MG/1
500 TABLET ORAL EVERY 8 HOURS SCHEDULED
Qty: 42 TABLET | Refills: 0 | Status: SHIPPED | OUTPATIENT
Start: 2025-02-19 | End: 2025-02-20 | Stop reason: HOSPADM

## 2025-02-19 RX ADMIN — SUCRALFATE 1 G: 1 TABLET ORAL at 08:35

## 2025-02-19 RX ADMIN — SUCRALFATE 1 G: 1 TABLET ORAL at 21:10

## 2025-02-19 RX ADMIN — INSULIN GLARGINE 27 UNITS: 100 INJECTION, SOLUTION SUBCUTANEOUS at 21:10

## 2025-02-19 RX ADMIN — FLUOXETINE HYDROCHLORIDE 40 MG: 20 CAPSULE ORAL at 08:31

## 2025-02-19 RX ADMIN — LORAZEPAM 1 MG: 1 TABLET ORAL at 21:10

## 2025-02-19 RX ADMIN — MEROPENEM 1000 MG: 1 INJECTION INTRAVENOUS at 03:10

## 2025-02-19 RX ADMIN — OXYCODONE HYDROCHLORIDE 2.5 MG: 5 TABLET ORAL at 17:22

## 2025-02-19 RX ADMIN — MORPHINE SULFATE 1 MG: 2 INJECTION, SOLUTION INTRAMUSCULAR; INTRAVENOUS at 19:27

## 2025-02-19 RX ADMIN — OXYCODONE HYDROCHLORIDE 2.5 MG: 5 TABLET ORAL at 06:18

## 2025-02-19 RX ADMIN — MEROPENEM 1000 MG: 1 INJECTION INTRAVENOUS at 18:08

## 2025-02-19 RX ADMIN — SUCRALFATE 1 G: 1 TABLET ORAL at 17:22

## 2025-02-19 RX ADMIN — METRONIDAZOLE 500 MG: 500 TABLET ORAL at 21:47

## 2025-02-19 RX ADMIN — OXYCODONE HYDROCHLORIDE AND ACETAMINOPHEN 1 TABLET: 5; 325 TABLET ORAL at 21:47

## 2025-02-19 RX ADMIN — OXYCODONE HYDROCHLORIDE AND ACETAMINOPHEN 1 TABLET: 5; 325 TABLET ORAL at 17:23

## 2025-02-19 RX ADMIN — SILVER SULFADIAZINE: 10 CREAM TOPICAL at 21:49

## 2025-02-19 RX ADMIN — BUPROPION HYDROCHLORIDE 150 MG: 150 TABLET, EXTENDED RELEASE ORAL at 08:35

## 2025-02-19 RX ADMIN — OXYCODONE HYDROCHLORIDE AND ACETAMINOPHEN 500 MG: 500 TABLET ORAL at 08:35

## 2025-02-19 RX ADMIN — Medication 50 MG: at 10:15

## 2025-02-19 RX ADMIN — FONDAPARINUX SODIUM 7.5 MG: 7.5 INJECTION SUBCUTANEOUS at 10:17

## 2025-02-19 RX ADMIN — SUCRALFATE 1 G: 1 TABLET ORAL at 11:22

## 2025-02-19 RX ADMIN — DILTIAZEM HYDROCHLORIDE 120 MG: 120 CAPSULE, COATED, EXTENDED RELEASE ORAL at 08:34

## 2025-02-19 RX ADMIN — INSULIN LISPRO 2 UNITS: 100 INJECTION, SOLUTION INTRAVENOUS; SUBCUTANEOUS at 21:10

## 2025-02-19 RX ADMIN — MORPHINE SULFATE 1 MG: 2 INJECTION, SOLUTION INTRAMUSCULAR; INTRAVENOUS at 08:36

## 2025-02-19 RX ADMIN — OXYCODONE HYDROCHLORIDE AND ACETAMINOPHEN 1 TABLET: 5; 325 TABLET ORAL at 06:18

## 2025-02-19 RX ADMIN — PANTOPRAZOLE SODIUM 40 MG: 40 TABLET, DELAYED RELEASE ORAL at 06:18

## 2025-02-19 RX ADMIN — Medication 1 TABLET: at 08:34

## 2025-02-19 RX ADMIN — OXYCODONE HYDROCHLORIDE AND ACETAMINOPHEN 1 TABLET: 5; 325 TABLET ORAL at 11:22

## 2025-02-19 RX ADMIN — SODIUM CHLORIDE, PRESERVATIVE FREE 10 ML: 5 INJECTION INTRAVENOUS at 09:00

## 2025-02-19 RX ADMIN — OXYCODONE HYDROCHLORIDE 2.5 MG: 5 TABLET ORAL at 21:47

## 2025-02-19 RX ADMIN — MEROPENEM 1000 MG: 1 INJECTION INTRAVENOUS at 08:57

## 2025-02-19 RX ADMIN — METRONIDAZOLE 500 MG: 500 TABLET ORAL at 13:36

## 2025-02-19 RX ADMIN — SILVER SULFADIAZINE: 10 CREAM TOPICAL at 10:15

## 2025-02-19 RX ADMIN — OXYCODONE HYDROCHLORIDE 2.5 MG: 5 TABLET ORAL at 11:23

## 2025-02-19 RX ADMIN — LOPERAMIDE HYDROCHLORIDE 2 MG: 2 CAPSULE ORAL at 18:06

## 2025-02-19 ASSESSMENT — ENCOUNTER SYMPTOMS
SHORTNESS OF BREATH: 0
ROS SKIN COMMENTS: SEE HPI.
RHINORRHEA: 0
VOMITING: 0
NAUSEA: 0
CHEST TIGHTNESS: 0
COUGH: 0
ABDOMINAL PAIN: 0

## 2025-02-19 ASSESSMENT — PAIN DESCRIPTION - DESCRIPTORS
DESCRIPTORS: SHARP
DESCRIPTORS: SHARP;STABBING
DESCRIPTORS: SHOOTING;ACHING;SORE
DESCRIPTORS: BURNING;SHARP
DESCRIPTORS: ACHING

## 2025-02-19 ASSESSMENT — PAIN DESCRIPTION - ORIENTATION
ORIENTATION: LEFT
ORIENTATION: RIGHT
ORIENTATION: LEFT;POSTERIOR

## 2025-02-19 ASSESSMENT — PAIN SCALES - GENERAL
PAINLEVEL_OUTOF10: 7
PAINLEVEL_OUTOF10: 6
PAINLEVEL_OUTOF10: 0
PAINLEVEL_OUTOF10: 7
PAINLEVEL_OUTOF10: 8
PAINLEVEL_OUTOF10: 7
PAINLEVEL_OUTOF10: 0
PAINLEVEL_OUTOF10: 7
PAINLEVEL_OUTOF10: 0
PAINLEVEL_OUTOF10: 6

## 2025-02-19 ASSESSMENT — PAIN DESCRIPTION - LOCATION
LOCATION: LEG;HIP
LOCATION: HIP

## 2025-02-19 NOTE — CARE COORDINATION
DISCHARGE PLANNING NOTE:    LSW following for potential discharge to SNF. Patient has been accepted at [facility]. Insurance authorization submitted on 2/18, message to Helene in admissions to follow up on status of pre cert.

## 2025-02-20 ENCOUNTER — APPOINTMENT (OUTPATIENT)
Dept: VASCULAR LAB | Age: 45
DRG: 920 | End: 2025-02-20
Payer: COMMERCIAL

## 2025-02-20 VITALS
SYSTOLIC BLOOD PRESSURE: 147 MMHG | HEART RATE: 79 BPM | HEIGHT: 68 IN | BODY MASS INDEX: 31.52 KG/M2 | RESPIRATION RATE: 18 BRPM | DIASTOLIC BLOOD PRESSURE: 75 MMHG | WEIGHT: 208 LBS | OXYGEN SATURATION: 93 % | TEMPERATURE: 98.4 F

## 2025-02-20 LAB
ANION GAP SERPL CALCULATED.3IONS-SCNC: 9 MMOL/L (ref 9–16)
BUN SERPL-MCNC: 6 MG/DL (ref 6–20)
CALCIUM SERPL-MCNC: 7.9 MG/DL (ref 8.6–10.4)
CHLORIDE SERPL-SCNC: 109 MMOL/L (ref 98–107)
CO2 SERPL-SCNC: 23 MMOL/L (ref 20–31)
CREAT SERPL-MCNC: 0.4 MG/DL (ref 0.7–1.2)
CRP SERPL HS-MCNC: 5.5 MG/L (ref 0–5)
GFR, ESTIMATED: >90 ML/MIN/1.73M2
GLUCOSE BLD-MCNC: 69 MG/DL (ref 65–105)
GLUCOSE BLD-MCNC: 87 MG/DL (ref 65–105)
GLUCOSE BLD-MCNC: 90 MG/DL (ref 65–105)
GLUCOSE BLD-MCNC: 94 MG/DL (ref 65–105)
GLUCOSE SERPL-MCNC: 109 MG/DL (ref 74–99)
MICROORGANISM SPEC CULT: ABNORMAL
MICROORGANISM SPEC CULT: ABNORMAL
MICROORGANISM SPEC CULT: NORMAL
MICROORGANISM SPEC CULT: NORMAL
MICROORGANISM/AGENT SPEC: ABNORMAL
POTASSIUM SERPL-SCNC: 4.5 MMOL/L (ref 3.7–5.3)
SERVICE CMNT-IMP: NORMAL
SERVICE CMNT-IMP: NORMAL
SODIUM SERPL-SCNC: 141 MMOL/L (ref 136–145)
SPECIMEN DESCRIPTION: ABNORMAL
SPECIMEN DESCRIPTION: NORMAL
SPECIMEN DESCRIPTION: NORMAL

## 2025-02-20 PROCEDURE — 6370000000 HC RX 637 (ALT 250 FOR IP)

## 2025-02-20 PROCEDURE — 2580000003 HC RX 258

## 2025-02-20 PROCEDURE — 6360000002 HC RX W HCPCS: Performed by: INTERNAL MEDICINE

## 2025-02-20 PROCEDURE — 97116 GAIT TRAINING THERAPY: CPT

## 2025-02-20 PROCEDURE — 36569 INSJ PICC 5 YR+ W/O IMAGING: CPT

## 2025-02-20 PROCEDURE — 80048 BASIC METABOLIC PNL TOTAL CA: CPT

## 2025-02-20 PROCEDURE — 6360000002 HC RX W HCPCS

## 2025-02-20 PROCEDURE — 93971 EXTREMITY STUDY: CPT

## 2025-02-20 PROCEDURE — 76937 US GUIDE VASCULAR ACCESS: CPT

## 2025-02-20 PROCEDURE — 82947 ASSAY GLUCOSE BLOOD QUANT: CPT

## 2025-02-20 PROCEDURE — 2500000003 HC RX 250 WO HCPCS

## 2025-02-20 PROCEDURE — 6370000000 HC RX 637 (ALT 250 FOR IP): Performed by: SURGERY

## 2025-02-20 PROCEDURE — 2580000003 HC RX 258: Performed by: INTERNAL MEDICINE

## 2025-02-20 PROCEDURE — 86140 C-REACTIVE PROTEIN: CPT

## 2025-02-20 PROCEDURE — C1751 CATH, INF, PER/CENT/MIDLINE: HCPCS

## 2025-02-20 PROCEDURE — 99239 HOSP IP/OBS DSCHRG MGMT >30: CPT | Performed by: INTERNAL MEDICINE

## 2025-02-20 PROCEDURE — 05HA33Z INSERTION OF INFUSION DEVICE INTO LEFT BRACHIAL VEIN, PERCUTANEOUS APPROACH: ICD-10-PCS | Performed by: INTERNAL MEDICINE

## 2025-02-20 PROCEDURE — 6370000000 HC RX 637 (ALT 250 FOR IP): Performed by: INTERNAL MEDICINE

## 2025-02-20 PROCEDURE — 97530 THERAPEUTIC ACTIVITIES: CPT

## 2025-02-20 PROCEDURE — 36415 COLL VENOUS BLD VENIPUNCTURE: CPT

## 2025-02-20 RX ORDER — VANCOMYCIN HYDROCHLORIDE 125 MG/1
125 CAPSULE ORAL 4 TIMES DAILY
Qty: 40 CAPSULE | Refills: 0 | Status: SHIPPED | OUTPATIENT
Start: 2025-02-20 | End: 2025-02-20 | Stop reason: HOSPADM

## 2025-02-20 RX ORDER — LOPERAMIDE HYDROCHLORIDE 2 MG/1
2 CAPSULE ORAL 4 TIMES DAILY PRN
Qty: 20 CAPSULE | Refills: 0 | Status: SHIPPED | OUTPATIENT
Start: 2025-02-20 | End: 2025-03-02

## 2025-02-20 RX ORDER — SODIUM CHLORIDE 0.9 % (FLUSH) 0.9 %
5-40 SYRINGE (ML) INJECTION PRN
Status: DISCONTINUED | OUTPATIENT
Start: 2025-02-20 | End: 2025-02-20 | Stop reason: HOSPADM

## 2025-02-20 RX ORDER — SODIUM CHLORIDE 0.9 % (FLUSH) 0.9 %
5-40 SYRINGE (ML) INJECTION EVERY 12 HOURS SCHEDULED
Status: DISCONTINUED | OUTPATIENT
Start: 2025-02-20 | End: 2025-02-20 | Stop reason: HOSPADM

## 2025-02-20 RX ORDER — OXYCODONE AND ACETAMINOPHEN 5; 325 MG/1; MG/1
1 TABLET ORAL EVERY 6 HOURS PRN
Qty: 12 TABLET | Refills: 0 | Status: SHIPPED | OUTPATIENT
Start: 2025-02-20 | End: 2025-02-23

## 2025-02-20 RX ORDER — LIDOCAINE HYDROCHLORIDE 10 MG/ML
50 INJECTION, SOLUTION EPIDURAL; INFILTRATION; INTRACAUDAL; PERINEURAL ONCE
Status: DISCONTINUED | OUTPATIENT
Start: 2025-02-20 | End: 2025-02-20 | Stop reason: HOSPADM

## 2025-02-20 RX ORDER — SODIUM CHLORIDE 9 MG/ML
INJECTION, SOLUTION INTRAVENOUS PRN
Status: DISCONTINUED | OUTPATIENT
Start: 2025-02-20 | End: 2025-02-20 | Stop reason: HOSPADM

## 2025-02-20 RX ORDER — METRONIDAZOLE 500 MG/1
500 TABLET ORAL 3 TIMES DAILY
Qty: 21 TABLET | Refills: 0 | Status: SHIPPED | OUTPATIENT
Start: 2025-02-20 | End: 2025-02-27

## 2025-02-20 RX ADMIN — OXYCODONE HYDROCHLORIDE 2.5 MG: 5 TABLET ORAL at 02:14

## 2025-02-20 RX ADMIN — OXYCODONE HYDROCHLORIDE AND ACETAMINOPHEN 1 TABLET: 5; 325 TABLET ORAL at 07:50

## 2025-02-20 RX ADMIN — BUPROPION HYDROCHLORIDE 150 MG: 150 TABLET, EXTENDED RELEASE ORAL at 07:50

## 2025-02-20 RX ADMIN — SUCRALFATE 1 G: 1 TABLET ORAL at 17:04

## 2025-02-20 RX ADMIN — MEROPENEM 1000 MG: 1 INJECTION INTRAVENOUS at 08:21

## 2025-02-20 RX ADMIN — Medication 1 TABLET: at 07:49

## 2025-02-20 RX ADMIN — FONDAPARINUX SODIUM 7.5 MG: 7.5 INJECTION SUBCUTANEOUS at 08:13

## 2025-02-20 RX ADMIN — OXYCODONE HYDROCHLORIDE 2.5 MG: 5 TABLET ORAL at 12:22

## 2025-02-20 RX ADMIN — OXYCODONE HYDROCHLORIDE AND ACETAMINOPHEN 1 TABLET: 5; 325 TABLET ORAL at 17:04

## 2025-02-20 RX ADMIN — OXYCODONE HYDROCHLORIDE 2.5 MG: 5 TABLET ORAL at 07:51

## 2025-02-20 RX ADMIN — DILTIAZEM HYDROCHLORIDE 120 MG: 120 CAPSULE, COATED, EXTENDED RELEASE ORAL at 07:49

## 2025-02-20 RX ADMIN — FLUOXETINE HYDROCHLORIDE 40 MG: 20 CAPSULE ORAL at 07:49

## 2025-02-20 RX ADMIN — MORPHINE SULFATE 1 MG: 2 INJECTION, SOLUTION INTRAMUSCULAR; INTRAVENOUS at 05:57

## 2025-02-20 RX ADMIN — METRONIDAZOLE 500 MG: 500 TABLET ORAL at 05:58

## 2025-02-20 RX ADMIN — PANTOPRAZOLE SODIUM 40 MG: 40 TABLET, DELAYED RELEASE ORAL at 05:58

## 2025-02-20 RX ADMIN — SILVER SULFADIAZINE: 10 CREAM TOPICAL at 07:56

## 2025-02-20 RX ADMIN — ERTAPENEM SODIUM 1000 MG: 1 INJECTION INTRAMUSCULAR; INTRAVENOUS at 15:49

## 2025-02-20 RX ADMIN — OXYCODONE HYDROCHLORIDE AND ACETAMINOPHEN 1 TABLET: 5; 325 TABLET ORAL at 02:14

## 2025-02-20 RX ADMIN — OXYCODONE HYDROCHLORIDE AND ACETAMINOPHEN 500 MG: 500 TABLET ORAL at 07:49

## 2025-02-20 RX ADMIN — SUCRALFATE 1 G: 1 TABLET ORAL at 15:43

## 2025-02-20 RX ADMIN — SODIUM CHLORIDE, PRESERVATIVE FREE 10 ML: 5 INJECTION INTRAVENOUS at 07:57

## 2025-02-20 RX ADMIN — LOPERAMIDE HYDROCHLORIDE 2 MG: 2 CAPSULE ORAL at 08:13

## 2025-02-20 RX ADMIN — LOPERAMIDE HYDROCHLORIDE 2 MG: 2 CAPSULE ORAL at 02:14

## 2025-02-20 RX ADMIN — MORPHINE SULFATE 1 MG: 2 INJECTION, SOLUTION INTRAMUSCULAR; INTRAVENOUS at 15:17

## 2025-02-20 RX ADMIN — OXYCODONE HYDROCHLORIDE 2.5 MG: 5 TABLET ORAL at 17:03

## 2025-02-20 RX ADMIN — ONDANSETRON 4 MG: 4 TABLET, ORALLY DISINTEGRATING ORAL at 15:17

## 2025-02-20 RX ADMIN — Medication 50 MG: at 07:56

## 2025-02-20 RX ADMIN — MEROPENEM 1000 MG: 1 INJECTION INTRAVENOUS at 02:10

## 2025-02-20 RX ADMIN — SODIUM CHLORIDE: 0.9 INJECTION, SOLUTION INTRAVENOUS at 08:05

## 2025-02-20 RX ADMIN — METRONIDAZOLE 500 MG: 500 TABLET ORAL at 15:17

## 2025-02-20 RX ADMIN — SUCRALFATE 1 G: 1 TABLET ORAL at 07:49

## 2025-02-20 RX ADMIN — OXYCODONE HYDROCHLORIDE AND ACETAMINOPHEN 1 TABLET: 5; 325 TABLET ORAL at 12:22

## 2025-02-20 ASSESSMENT — PAIN DESCRIPTION - LOCATION
LOCATION: HIP
LOCATION: BUTTOCKS;HIP

## 2025-02-20 ASSESSMENT — PAIN DESCRIPTION - ORIENTATION
ORIENTATION: LEFT

## 2025-02-20 ASSESSMENT — PAIN SCALES - GENERAL
PAINLEVEL_OUTOF10: 6
PAINLEVEL_OUTOF10: 7
PAINLEVEL_OUTOF10: 6
PAINLEVEL_OUTOF10: 7
PAINLEVEL_OUTOF10: 1
PAINLEVEL_OUTOF10: 7
PAINLEVEL_OUTOF10: 3
PAINLEVEL_OUTOF10: 8

## 2025-02-20 ASSESSMENT — PAIN DESCRIPTION - DESCRIPTORS
DESCRIPTORS: SHARP
DESCRIPTORS: SORE
DESCRIPTORS: SHARP

## 2025-02-20 ASSESSMENT — ENCOUNTER SYMPTOMS
ABDOMINAL PAIN: 0
SHORTNESS OF BREATH: 0
CHEST TIGHTNESS: 0

## 2025-02-20 ASSESSMENT — PAIN - FUNCTIONAL ASSESSMENT: PAIN_FUNCTIONAL_ASSESSMENT: ACTIVITIES ARE NOT PREVENTED

## 2025-02-20 NOTE — CARE COORDINATION
ONGOING DISCHARGE PLAN:    Patient is alert and oriented x4.    Spoke with patient regarding discharge plan and patient now would like to go home with [facility] for IV antibiotics. Patient states that she is capable of doing IV antibiotics at home.    [facility] notified and is here to do education. [facility] notified and can do start of care tomorrow afternoon.    Prescription faxed and flush orders called in.    Patient midline is not flushing and she will need new line before leaving.    IV Invanz on discharge through 2/24/25.    Will continue to follow for additional discharge needs.    If patient is discharged prior to next notation, then this note serves as note for discharge by case management.    Electronically signed by Helene Crain RN on 2/20/2025 at 11:43 AM

## 2025-02-20 NOTE — PLAN OF CARE
Problem: Chronic Conditions and Co-morbidities  Goal: Patient's chronic conditions and co-morbidity symptoms are monitored and maintained or improved  2/17/2025 0254 by Trinity Randolph RN  Outcome: Progressing     Problem: Discharge Planning  Goal: Discharge to home or other facility with appropriate resources  2/17/2025 0254 by Trinity Randolph RN  Outcome: Progressing     Problem: Pain  Goal: Verbalizes/displays adequate comfort level or baseline comfort level  2/17/2025 0254 by Trinity Randolph RN  Outcome: Progressing     Problem: Skin/Tissue Integrity  Goal: Skin integrity remains intact  Description: 1.  Monitor for areas of redness and/or skin breakdown  2.  Assess vascular access sites hourly  3.  Every 4-6 hours minimum:  Change oxygen saturation probe site  4.  Every 4-6 hours:  If on nasal continuous positive airway pressure, respiratory therapy assess nares and determine need for appliance change or resting period  2/17/2025 0254 by Trinity Randolph RN  Outcome: Progressing     Problem: ABCDS Injury Assessment  Goal: Absence of physical injury  2/17/2025 0254 by Trinity Randolph RN  Outcome: Progressing     Problem: Safety - Adult  Goal: Free from fall injury  2/17/2025 0254 by Trinity Randolph RN  Outcome: Progressing     
  Problem: Chronic Conditions and Co-morbidities  Goal: Patient's chronic conditions and co-morbidity symptoms are monitored and maintained or improved  2/17/2025 1437 by Camille Monsalve RN  Outcome: Progressing  2/17/2025 0254 by Trinity Randolph RN  Outcome: Progressing     Problem: Discharge Planning  Goal: Discharge to home or other facility with appropriate resources  2/17/2025 1437 by Camille Monsalve RN  Outcome: Progressing  2/17/2025 0254 by Trinity Randolph RN  Outcome: Progressing     Problem: Pain  Goal: Verbalizes/displays adequate comfort level or baseline comfort level  2/17/2025 1437 by Camille Monsalve RN  Outcome: Progressing  2/17/2025 0254 by Trinity Randolph RN  Outcome: Progressing     Problem: Skin/Tissue Integrity  Goal: Skin integrity remains intact  Description: 1.  Monitor for areas of redness and/or skin breakdown  2.  Assess vascular access sites hourly  3.  Every 4-6 hours minimum:  Change oxygen saturation probe site  4.  Every 4-6 hours:  If on nasal continuous positive airway pressure, respiratory therapy assess nares and determine need for appliance change or resting period  2/17/2025 1437 by Camille Monsalve RN  Outcome: Progressing  2/17/2025 0254 by Trinity Randolph RN  Outcome: Progressing     Problem: ABCDS Injury Assessment  Goal: Absence of physical injury  2/17/2025 1437 by Camille Monsalve RN  Outcome: Progressing  2/17/2025 0254 by Trinity Randolph RN  Outcome: Progressing     Problem: Safety - Adult  Goal: Free from fall injury  2/17/2025 1437 by Camille Monsalve RN  Outcome: Progressing  2/17/2025 0254 by Trinity Randolph RN  Outcome: Progressing     Problem: Nutrition Deficit:  Goal: Optimize nutritional status  Outcome: Progressing     
  Problem: Chronic Conditions and Co-morbidities  Goal: Patient's chronic conditions and co-morbidity symptoms are monitored and maintained or improved  2/18/2025 1449 by Camille Monsalve RN  Outcome: Progressing  2/18/2025 0357 by Schober, Robyn L, RN  Outcome: Progressing  Flowsheets (Taken 2/18/2025 0040)  Care Plan - Patient's Chronic Conditions and Co-Morbidity Symptoms are Monitored and Maintained or Improved: Monitor and assess patient's chronic conditions and comorbid symptoms for stability, deterioration, or improvement     Problem: Discharge Planning  Goal: Discharge to home or other facility with appropriate resources  2/18/2025 1449 by Camille Monsalve RN  Outcome: Progressing  2/18/2025 0357 by Schober, Robyn L, RN  Outcome: Progressing  Flowsheets (Taken 2/18/2025 0040)  Discharge to home or other facility with appropriate resources: Identify barriers to discharge with patient and caregiver     Problem: Pain  Goal: Verbalizes/displays adequate comfort level or baseline comfort level  2/18/2025 1449 by Camille Monsalve RN  Outcome: Progressing  2/18/2025 0357 by Schober, Robyn L, RN  Outcome: Progressing  Flowsheets (Taken 2/17/2025 2139)  Verbalizes/displays adequate comfort level or baseline comfort level:   Encourage patient to monitor pain and request assistance   Assess pain using appropriate pain scale   Administer analgesics based on type and severity of pain and evaluate response     Problem: Skin/Tissue Integrity  Goal: Skin integrity remains intact  Description: 1.  Monitor for areas of redness and/or skin breakdown  2.  Assess vascular access sites hourly  3.  Every 4-6 hours minimum:  Change oxygen saturation probe site  4.  Every 4-6 hours:  If on nasal continuous positive airway pressure, respiratory therapy assess nares and determine need for appliance change or resting period  2/18/2025 1449 by Camille Monsalve RN  Outcome: Progressing  2/18/2025 0357 by Schober, Robyn L, 
  Problem: Chronic Conditions and Co-morbidities  Goal: Patient's chronic conditions and co-morbidity symptoms are monitored and maintained or improved  2/19/2025 1811 by Brianna Collazo RN  Outcome: Progressing  Flowsheets (Taken 2/18/2025 2145 by Schober, Robyn L, RN)  Care Plan - Patient's Chronic Conditions and Co-Morbidity Symptoms are Monitored and Maintained or Improved: Monitor and assess patient's chronic conditions and comorbid symptoms for stability, deterioration, or improvement  2/19/2025 0517 by Schober, Robyn L, RN  Outcome: Progressing  Flowsheets (Taken 2/18/2025 2145)  Care Plan - Patient's Chronic Conditions and Co-Morbidity Symptoms are Monitored and Maintained or Improved: Monitor and assess patient's chronic conditions and comorbid symptoms for stability, deterioration, or improvement     Problem: Discharge Planning  Goal: Discharge to home or other facility with appropriate resources  2/19/2025 1811 by Brianna Collazo RN  Outcome: Progressing  Flowsheets (Taken 2/18/2025 2145 by Schober, Robyn L, RN)  Discharge to home or other facility with appropriate resources: Identify barriers to discharge with patient and caregiver  2/19/2025 0517 by Schober, Robyn L, RN  Outcome: Progressing  Flowsheets (Taken 2/18/2025 2145)  Discharge to home or other facility with appropriate resources: Identify barriers to discharge with patient and caregiver     Problem: Pain  Goal: Verbalizes/displays adequate comfort level or baseline comfort level  2/19/2025 1811 by Brianna Collazo RN  Outcome: Progressing  Flowsheets (Taken 2/19/2025 0045 by Schober, Robyn L, RN)  Verbalizes/displays adequate comfort level or baseline comfort level:   Encourage patient to monitor pain and request assistance   Assess pain using appropriate pain scale   Administer analgesics based on type and severity of pain and evaluate response   Implement non-pharmacological measures as appropriate and evaluate response  2/19/2025 0517 
  Problem: Chronic Conditions and Co-morbidities  Goal: Patient's chronic conditions and co-morbidity symptoms are monitored and maintained or improved  2/20/2025 0318 by Leonel Becerra RN  Outcome: Progressing  Flowsheets (Taken 2/19/2025 2114)  Care Plan - Patient's Chronic Conditions and Co-Morbidity Symptoms are Monitored and Maintained or Improved:   Monitor and assess patient's chronic conditions and comorbid symptoms for stability, deterioration, or improvement   Collaborate with multidisciplinary team to address chronic and comorbid conditions and prevent exacerbation or deterioration     Problem: Discharge Planning  Goal: Discharge to home or other facility with appropriate resources  2/20/2025 0318 by Leonel Becerra RN  Outcome: Progressing  Flowsheets (Taken 2/19/2025 2114)  Discharge to home or other facility with appropriate resources:   Identify barriers to discharge with patient and caregiver   Arrange for needed discharge resources and transportation as appropriate   Identify discharge learning needs (meds, wound care, etc)     Problem: Pain  Goal: Verbalizes/displays adequate comfort level or baseline comfort level  2/20/2025 0318 by Leonel Becerra RN  Outcome: Progressing  Flowsheets (Taken 2/19/2025 1927)  Verbalizes/displays adequate comfort level or baseline comfort level:   Encourage patient to monitor pain and request assistance   Assess pain using appropriate pain scale   Administer analgesics based on type and severity of pain and evaluate response   Implement non-pharmacological measures as appropriate and evaluate response     Problem: Skin/Tissue Integrity  Goal: Skin integrity remains intact  Description: 1.  Monitor for areas of redness and/or skin breakdown  2.  Assess vascular access sites hourly  3.  Every 4-6 hours minimum:  Change oxygen saturation probe site  4.  Every 4-6 hours:  If on nasal continuous positive airway pressure, respiratory therapy assess nares and 
  Problem: Chronic Conditions and Co-morbidities  Goal: Patient's chronic conditions and co-morbidity symptoms are monitored and maintained or improved  2/20/2025 1155 by Brianna Collazo RN  Outcome: Progressing  Flowsheets (Taken 2/19/2025 2114 by Leonel Becerra, RN)  Care Plan - Patient's Chronic Conditions and Co-Morbidity Symptoms are Monitored and Maintained or Improved:   Monitor and assess patient's chronic conditions and comorbid symptoms for stability, deterioration, or improvement   Collaborate with multidisciplinary team to address chronic and comorbid conditions and prevent exacerbation or deterioration  2/20/2025 0318 by Leonel Becerra RN  Outcome: Progressing  Flowsheets (Taken 2/19/2025 2114)  Care Plan - Patient's Chronic Conditions and Co-Morbidity Symptoms are Monitored and Maintained or Improved:   Monitor and assess patient's chronic conditions and comorbid symptoms for stability, deterioration, or improvement   Collaborate with multidisciplinary team to address chronic and comorbid conditions and prevent exacerbation or deterioration     Problem: Discharge Planning  Goal: Discharge to home or other facility with appropriate resources  2/20/2025 1155 by Brianna Collazo RN  Outcome: Progressing  Flowsheets (Taken 2/19/2025 2114 by Leonel Becerra, RN)  Discharge to home or other facility with appropriate resources:   Identify barriers to discharge with patient and caregiver   Arrange for needed discharge resources and transportation as appropriate   Identify discharge learning needs (meds, wound care, etc)  2/20/2025 0318 by Leonel Becerra RN  Outcome: Progressing  Flowsheets (Taken 2/19/2025 2114)  Discharge to home or other facility with appropriate resources:   Identify barriers to discharge with patient and caregiver   Arrange for needed discharge resources and transportation as appropriate   Identify discharge learning needs (meds, wound care, etc)     Problem: Pain  Goal: 
  Problem: Chronic Conditions and Co-morbidities  Goal: Patient's chronic conditions and co-morbidity symptoms are monitored and maintained or improved  Outcome: Progressing  Flowsheets (Taken 2/18/2025 2145)  Care Plan - Patient's Chronic Conditions and Co-Morbidity Symptoms are Monitored and Maintained or Improved: Monitor and assess patient's chronic conditions and comorbid symptoms for stability, deterioration, or improvement     Problem: Discharge Planning  Goal: Discharge to home or other facility with appropriate resources  Outcome: Progressing  Flowsheets (Taken 2/18/2025 2145)  Discharge to home or other facility with appropriate resources: Identify barriers to discharge with patient and caregiver     Problem: Pain  Goal: Verbalizes/displays adequate comfort level or baseline comfort level  Outcome: Progressing  Flowsheets (Taken 2/19/2025 0045)  Verbalizes/displays adequate comfort level or baseline comfort level:   Encourage patient to monitor pain and request assistance   Assess pain using appropriate pain scale   Administer analgesics based on type and severity of pain and evaluate response   Implement non-pharmacological measures as appropriate and evaluate response     Problem: Skin/Tissue Integrity  Goal: Skin integrity remains intact  Description: 1.  Monitor for areas of redness and/or skin breakdown  2.  Assess vascular access sites hourly  3.  Every 4-6 hours minimum:  Change oxygen saturation probe site  4.  Every 4-6 hours:  If on nasal continuous positive airway pressure, respiratory therapy assess nares and determine need for appliance change or resting period  Outcome: Progressing  Flowsheets (Taken 2/19/2025 0239)  Skin Integrity Remains Intact:   Monitor for areas of redness and/or skin breakdown   Assess vascular access sites hourly     Problem: ABCDS Injury Assessment  Goal: Absence of physical injury  Outcome: Progressing  Flowsheets (Taken 2/19/2025 0239)  Absence of Physical Injury: 
The patient was seen and examined, chart reviewed.  Midline   D/c vancomycin and Zosyn  IV meropenem through 2/24/2025  No objection for discharge from infectious disease point of view  Full consult to follow  
RN  Outcome: Progressing  Flowsheets (Taken 2/18/2025 0040)  Absence of Physical Injury: Implement safety measures based on patient assessment     Problem: Safety - Adult  Goal: Free from fall injury  2/18/2025 0357 by Schober, Robyn L, RN  Outcome: Progressing  Flowsheets (Taken 2/18/2025 0040)  Free From Fall Injury: Based on caregiver fall risk screen, instruct family/caregiver to ask for assistance with transferring infant if caregiver noted to have fall risk factors     Problem: Nutrition Deficit:  Goal: Optimize nutritional status  2/18/2025 0357 by Schober, Robyn L, RN  Outcome: Progressing     
redness and/or skin breakdown  Taken 2/16/2025 1025  Skin Integrity Remains Intact: Monitor for areas of redness and/or skin breakdown     Problem: ABCDS Injury Assessment  Goal: Absence of physical injury  Outcome: Progressing  Flowsheets (Taken 2/16/2025 1200)  Absence of Physical Injury: Implement safety measures based on patient assessment     Problem: Safety - Adult  Goal: Free from fall injury  Outcome: Progressing  Flowsheets (Taken 2/16/2025 1200)  Free From Fall Injury: Instruct family/caregiver on patient safety

## 2025-02-20 NOTE — CARE COORDINATION
Continuity of Care Form    Patient Name: Krsita Chiang   :  1980  MRN:  [id number]    Admit date:  2/15/2025  Discharge date:  25    Code Status Order: Full Code   Advance Directives:   Advance Care Flowsheet Documentation             Admitting Physician:  Crystal Estrada MD  PCP: Arielle Melton, APRN - NP    Discharging Nurse:   Discharging Hospital Unit/Room#:   Discharging Unit Phone Number: [phone]    Emergency Contact:   Extended Emergency Contact Information  Primary Emergency Contact: Naima Grant  Address: [address] [address]  Home Phone: [phone]  Mobile Phone: [phone]  Relation: Parent  Secondary Emergency Contact: Zachery Grant  Home Phone: [phone]  Mobile Phone: [phone]  Relation: Brother/Sister    Past Surgical History:  Past Surgical History:   Procedure Laterality Date    ABDOMINAL HERNIA REPAIR      incisional hernia repair, complicated by infection, had multiple surgeries for this    ABDOMINAL HERNIA REPAIR  2014    ANKLE FRACTURE SURGERY Left     2023  HIP IRRIGATION AND DEBRIDEMENT AND PLACEMENT OF ANTIBIOTIC IMPREGNATED CEMENT BEADS performed by Dipak Traore DO at [email or web address] OR    ANKLE FRACTURE SURGERY Left 10/24/2023    IRRIGATION AND DEBRIDEMENT OF LEFT HIP WITH CLOSURE performed by Dipak Traore DO at [facility] [[email or web address] OR    ANKLE SURGERY Left 2019    ligament    ANKLE SURGERY Left 2023    IRRIGATION AND DEBRIDEMENT HIP (IRRISEPT, CELLERATE) performed by Dipak Traore DO at [facility] [[email or web address] OR    BARIATRIC SURGERY  2013    [facility]     SECTION      CHOLECYSTECTOMY      DILATION AND CURETTAGE OF UTERUS  2005    ESOPHAGOGASTRODUODENOSCOPY  2021    ESOPHAGOGASTRODUODENOSCOPY  2013    FINGER AMPUTATION Left 2015    index and ring finger. from [facility] CATH POWER PICC SINGLE  2023    HIP SURGERY Left     2023

## 2025-02-20 NOTE — PROGRESS NOTES
[facility] -   Infectious diseases evaluation  admission date 2/15/2025    reason for consultation:   UTI    Impression :   Current:  UTI /ESBL producing E. coli growth on urine culture  Right upper thigh/abdominal wounds infection  Left hip wounds ,no signs of  infection currently  Suspected chronic osteomyelitis  S/P left hip hemiarthroplasty performed by Dr. Traore on 7/19/2023 after sustaining a femoral neck fracture.    S/P left hip I&D /placement of antibiotic cement beads on 8/21/2023 due to infection post hemiarthroplasty.  The patient was treated with Avycaz due to resistant Klebsiella on culture.S/P left hip Girdlestone at [facility].    DM  Alcohol abuse   Antiphospholipid syndrome   H/O bariatric surgery   H/O PE/DVT status post IVC filter  H/O MRSA/MDRO/VRE/Acinetobacter  History of C. difficile colitis      HENCE:     IV meropenem through 2/24/2025, may change to ertapenem on discharge if patient is going home, reconciled  Follow wound cultures  Wound care  No objection for discharge from infectious disease point of view  Discussed with Dr. Heard    Infection Control Recommendations   [place] Precautions  Contact Isolation       Antimicrobial Stewardship Recommendations   Simplification of therapy  Targeted therapy      History of Present Illness:   Initial history:  Krista Chiang is a 44 y.o.-year-old female presented to hospital for multiple wounds, dysuria, burning with urination, urgency and not feeling well.  Initial WBC 5.2, creatinine 0.6  Urine culture grew ESBL producing E. coli  She had wounds to the left hip with no bone exposure, no necrotic tissue or purulent drainage, no surrounding redness.  The patient also had abdominal wall and right thigh wound with purulent drainage and surrounding redness.  S/P left hip hemiarthroplasty performed by Dr. Traore on 7/19/2023 after sustaining a femoral neck fracture.    S/P left hip I&D /placement of 
  [facility]    PROGRESS NOTE             2/20/2025    8:49 AM    Name:   Krista Chiang  MRN:     [id number]     Acct:      [id number]   Room:   2052/2052-01  IP Day:  5  Admit Date:  2/15/2025 11:02 AM    PCP:  Arielle Melton APRN - NP  Code Status:  Full Code    Subjective:     C/C:   Chief Complaint   Patient presents with    Wound Dehiscence     Interval History Status: Better    Patient was seen at bedside  Patient reports feeling much better  Has been having frequent bowel movements; Loperamide was added, which improved consistency  She has a history of C.diff; will consider starting vanco po  Labs Pending  UTI;  Will need IV Ertapenem through 2/24/2025  Precept pending  She was started on Flagyl yesterday for presumed intraabdominal infection  Patient wants to go back home; spoke with     Brief History:     The patient is a 44 y.o.  Non- / non  female who presents with Wound Dehiscence  and she is admitted to the hospital for the management of wound dehiscence and possible infection.     Patient has a past medical history of bariatric surgery, left hip arthroplasty (complicated with infection, hardware removed, got infected, on doxycycline until 1/16/2025 for Klebsiella infection, previous infection of MRSA, and VRE), s/p left fractured tibia and fibula, SVT on Cardizem, uncontrolled T2DM on insulin, DVT s/p IVC filter, antiphospholipid syndrome (on fondaparinux).     Today patient presented to ER with 3 open wounds located in posterior left buttock, right medial thigh and lower abdomen.  Per patient, her buttock wound wound had not been healing since 19 months, and this past Monday she noticed her wounds were opening up as well as new wound on lower abdomen and medial right thigh.  She reports severe pain as well as drainage from the wounds.  Denies fever, chills, bloody discharge or any other 
  [facility]    PROGRESS NOTE             2/19/2025    9:25 AM    Name:   Krista Chiang  MRN:     [id number]     Acct:      [id number]   Room:   2052/2052-01  IP Day:  4  Admit Date:  2/15/2025 11:02 AM    PCP:  Arielle Melton APRN - NP  Code Status:  Full Code    Subjective:     C/C:   Chief Complaint   Patient presents with    Wound Dehiscence     Interval History Status: Better    Patient was seen at bedside  Patient reports feeling much better today  Labs acceptable  Urine culture grew E. coli sensitive to meropenem.  Will need IV meropenem through 2/24/2025  Midline inserted yesterday; he nurse attempted flushing today minutes was unsuccessful  Wounds are clean and do not require debridement per Surgery  Patient wants to go back home; but is okay with going to SNF till end date of Abx  Discharge today once midline is working;    Brief History:     The patient is a 44 y.o.  Non- / non  female who presents with Wound Dehiscence  and she is admitted to the hospital for the management of wound dehiscence and possible infection.     Patient has a past medical history of bariatric surgery, left hip arthroplasty (complicated with infection, hardware removed, got infected, on doxycycline until 1/16/2025 for Klebsiella infection, previous infection of MRSA, and VRE), s/p left fractured tibia and fibula, SVT on Cardizem, uncontrolled T2DM on insulin, DVT s/p IVC filter, antiphospholipid syndrome (on fondaparinux).     Today patient presented to ER with 3 open wounds located in posterior left buttock, right medial thigh and lower abdomen.  Per patient, her buttock wound wound had not been healing since 19 months, and this past Monday she noticed her wounds were opening up as well as new wound on lower abdomen and medial right thigh.  She reports severe pain as well as drainage from the wounds.  Denies fever, chills, bloody 
  [facility]    PROGRESS NOTE             2/17/2025    8:21 AM    Name:   Krista Chiang  MRN:     [id number]     Acct:      [id number]   Room:   2052/2052-01  IP Day:  2  Admit Date:  2/15/2025 11:02 AM    PCP:  Arielle Melton APRN - NP  Code Status:  Full Code    Subjective:     C/C:   Chief Complaint   Patient presents with    Wound Dehiscence     Interval History Status: not changed.  Patient was seen at bedside  Patient continues to report significant pain due to wounds and reports that she usually takes 7.5 Percocet.  Labs pending  Urine culture grew E. coli sensitive to meropenem.  She is on Zosyn and vancomycin.  Will defer antibiotics to ID  Wounds are clean and do not require debridement per Surgery  Prealbumin 11 ; consistent with protein calorie malnutrition; supplements added; dietician consulted.    Brief History:     The patient is a 44 y.o.  Non- / non  female who presents with Wound Dehiscence  and she is admitted to the hospital for the management of wound dehiscence and possible infection.     Patient has a past medical history of bariatric surgery, left hip arthroplasty (complicated with infection, hardware removed, got infected, on doxycycline until 1/16/2025 for Klebsiella infection, previous infection of MRSA, and VRE), s/p left fractured tibia and fibula, SVT on Cardizem, uncontrolled T2DM on insulin, DVT s/p IVC filter, antiphospholipid syndrome (on fondaparinux).     Today patient presented to ER with 3 open wounds located in posterior left buttock, right medial thigh and lower abdomen.  Per patient, her buttock wound wound had not been healing since 19 months, and this past Monday she noticed her wounds were opening up as well as new wound on lower abdomen and medial right thigh.  She reports severe pain as well as drainage from the wounds.  Denies fever, chills, bloody discharge or any other 
  [facility] General Surgery   Coy Caban MD, FACS  Allison Braun, APRN-CNP  [address], [address]  [place] [address]  P: [phone], F: [phone]    General and Robotic Surgery  Progress Note             PATIENT NAME: Krista Chiang   :  1980   MRN: [id number]   PCP:  Arielle Melton, BERNARDA - NP     TODAY'S DATE: 2025    44 y.o. female seen and examined at bedside earlier this afternoon.  Wound/ostomy nurse at bedside.  Photos taken today reviewed.  Infectious disease following for antibiotic management.  Patient afebrile.    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 2015    Fibromyalgia     Genital herpes, unspecified     GERD (gastroesophageal reflux disease)     H/O bariatric surgery 2013    Awa and Y    History of pulmonary embolism     Hx of blood clots     clots in both legs and lungs     Hypertension     Lives in nursing home 2023    [facility] :  # [phone] , fax # [phone]    Lumbosacral spondylosis without myelopathy     MDRO (multiple drug resistant organisms) resistance 2010    MRSA (abd)    Mobility impaired     wheelchair, uses a walker and pivots on right foot    MRSA (methicillin resistant staph aureus) culture positive 02/10/2017    urine    Muscle weakness     Obsessive-compulsive disorder, unspecified     Opioid abuse, in remission (HCC)     Pernicious anemia     Polyneuropathy, unspecified     PTSD (post-traumatic stress disorder)     Pulmonary embolism (HCC) 2010    Suicidal behavior 
  [facility]    PROGRESS NOTE             2/16/2025    10:12 AM    Name:   Krista Chiang  MRN:     [id number]     Acct:      [id number]   Room:   2052/2052-01   Day:  1  Admit Date:  2/15/2025 11:02 AM    PCP:  Arielle Melton APRN - NP  Code Status:  Full Code    Subjective:     C/C:   Chief Complaint   Patient presents with    Wound Dehiscence     Interval History Status: not changed.    Patient continues to report significant pain due to wounds and reports that she usually takes 7.5 Percocet versus 5.  Also endorses taking Ativan as needed to sleep.  Reports gabapentin does not help much for neuropathy.     Brief History:     The patient is a 44 y.o.  Non- / non  female who presents with Wound Dehiscence  and she is admitted to the hospital for the management of wound dehiscence and possible infection.     Patient has a past medical history of bariatric surgery, left hip arthroplasty (complicated with infection, hardware removed, got infected, on doxycycline until 1/16/2025 for Klebsiella infection, previous infection of MRSA, and VRE), s/p left fractured tibia and fibula, SVT on Cardizem, uncontrolled T2DM on insulin, DVT s/p IVC filter, antiphospholipid syndrome (on fondaparinux).     Today patient presented to ER with 3 open wounds located in posterior left buttock, right medial thigh and lower abdomen.  Per patient, her buttock wound wound had not been healing since 19 months, and this past Monday she noticed her wounds were opening up as well as new wound on lower abdomen and medial right thigh.  She reports severe pain as well as drainage from the wounds.  Denies fever, chills, bloody discharge or any other associated symptoms.  Patient also reports increased urinary frequency and urgency, denies dysuria, burning frequent sensation of any other associated UTI.     In ER, CBC negative for leukocytosis, BMP 
2/18/25 9:35am Care Coord [facility] potentially will let Outpatient Pharm know for sure after speaking with pt   
[facility]   Occupational Therapy Evaluation  Date: 25  Patient Name: Krista Chiang       Room:   MRN: [id number]  Account: [id number]   : 1980  (44 y.o.) Gender: female     Discharge Recommendations:  Further Occupational Therapy is recommended upon facility discharge.    OT Equipment Recommendations  Other: continue to assess    Referring Practitioner: Jori Thorne MD  Diagnosis: Wound dehiscence        Treatment Diagnosis: impaired self care status    Past Medical History:  has a past medical history of Alcohol abuse, Alcoholic hepatitis without ascites, Antiphospholipid syndrome ([facility]), Anxiety, Arthritis, Painting esophagus, Bipolar disorder, unspecified ([facility]), 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 encounter, Constipation, Depression, Fibromyalgia, Genital herpes, unspecified, GERD (gastroesophageal reflux disease), H/O bariatric surgery, History of pulmonary embolism, Hx of blood clots, Hypertension, Lives in nursing home, Lumbosacral spondylosis without myelopathy, MDRO (multiple drug resistant organisms) resistance, Mobility impaired, MRSA (methicillin resistant staph aureus) culture positive, Muscle weakness, Obsessive-compulsive disorder, unspecified, Opioid abuse, in remission ([facility]), Pernicious anemia, Polyneuropathy, unspecified, PTSD (post-traumatic stress disorder), Pulmonary embolism ([facility]), Suicidal behavior, SVT (supraventricular tachycardia) ([facility]), Type 2 diabetes mellitus, with long-term current use of insulin ([facility]), Under care of team, and Under care of team.    Past Surgical History:   has a past surgical history that includes Cholecystectomy; Liver Resection (); Tonsillectomy; Abdominal hernia repair (); Abdominal hernia repair (2014); Bariatric Surgery (2013); Dilation and curettage of uterus (); Finger amputation (Left, 2015); lymph node biopsy (); Total abdominal 
Attempted to return labs call. No answer.  
CLINICAL PHARMACY NOTE: MEDS TO BEDS    Total # of Prescriptions Filled: 5   The following medications were delivered to the patient:  Metronidazole 500MG Tablets  Loperamide HCL 2MG Capsules  Bumetanide 2MG Tablets   Multivitamins  Vitamin C 500MG Tablets    Additional Documentation:  Delivered to Cathy ROGEL and signed for at 3:04PM 2/20/25 to be locked up. PT has been going back and forth between going home and getting discharged to a SNF.     Percocet not filled - PT received #56 on 2/11, and has been inpatient since 2/15 - should have plenty at home.   
Comprehensive Nutrition Assessment    Type and Reason for Visit:  Consult, Wound (SUPPLEMENTS)    Nutrition Recommendations/Plan:   Will provide double protein portions with 4 carbohydrate choices per tray  Will provide Ensure High Protein (vanilla) once daily, Glucerna (strawberry) once daily     Malnutrition Assessment:  Malnutrition Status:  At risk for malnutrition (02/17/25 1125)    Context:  Chronic Illness     Findings of the 6 clinical characteristics of malnutrition:  Energy Intake:  No decrease in energy intake  Weight Loss:  No weight loss     Body Fat Loss:  Unable to assess     Muscle Mass Loss:  No muscle mass loss    Fluid Accumulation:  No fluid accumulation     Strength:  Not Performed    Nutrition Assessment:    Pt admitted from [facility] due to wound dehis. Pt has a H/O wounds and eats mostly protein foods at [facility]. She drinks Boost supplements. She is willing to take Ensure at this facility (no chocolate). Pt has no teeth.    Nutrition Related Findings:    no edema, Labs: POC Glu 98, Meds: Reviewed, PMH: ETOH, Awa en Y, DM Wound Type: Multiple, Diabetic Ulcer       Current Nutrition Intake & Therapies:    Average Meal Intake: 51-75%     ADULT DIET; Regular; 4 carb choices (60 gm/meal)  ADULT ORAL NUTRITION SUPPLEMENT; Breakfast, Dinner; Low Calorie/High Protein Oral Supplement    Anthropometric Measures:  Height: 172.7 cm (5' 8\")  Ideal Body Weight (IBW): 140 lbs (64 kg)    Admission Body Weight: 94 kg (207 lb 3.7 oz) (stated)  Current Body Weight: 91 kg (200 lb 9.9 oz) (obtained by RD), 143.3 % IBW. Weight Source: Bed scale  Current BMI (kg/m2): 30.5  Usual Body Weight:  (199-216#)                          BMI Categories: Obese Class 1 (BMI 30.0-34.9)    Estimated Daily Nutrient Needs:  Energy Requirements Based On: Formula  Weight Used for Energy Requirements: Current  Energy (kcal/day): [place] x 1- 1.9=8335-8013 kcal  Weight Used for Protein Requirements: Current  Protein (g/day): 1.4g/kg= 
[facility]   OCCUPATIONAL THERAPY MISSED TREATMENT NOTE   INPATIENT   Date: 25  Patient Name: Krista Chiang       Room:   MRN: [id number]   Account #: [id number]    : 1980  (44 y.o.)  Gender: female   Referring Practitioner: Jori Thorne MD  Diagnosis: Wound dehiscence    REASON FOR MISSED TREATMENT:  Patient declined   -    Pt reports that she just got back to bed after getting cleaned up following an episode of \"uncontrollable diarrhea\".  Pt states, \"I don't even know I am going, it just comes out\".  Pt declined getting back out of bed to participate with therapy at this time.  1055    Electronically signed by KATHLEEN Lyn on 25 at 11:06 AM EST    
[facility]   Pharmacy Pharmacokinetic Monitoring Service - Vancomycin    Consulting Provider: Fadumo   Indication: SSTI x 7 days  Target Concentration: Goal trough of 10-15 mg/L and AUC/JAVED <500 mg*hr/L  Day of Therapy: 3  Additional Antimicrobials: Merrem    Pertinent Laboratory Values:   Wt Readings from Last 1 Encounters:   02/15/25 94.3 kg (208 lb)     Temp Readings from Last 1 Encounters:   02/17/25 98.1 °F (36.7 °C) (Oral)     Estimated Creatinine Clearance: 171 mL/min (A) (based on SCr of 0.5 mg/dL (L)).  Recent Labs     02/15/25  1230 02/17/25  1149   CREATININE 0.6* 0.5*   BUN 11 9   WBC 5.2 6.1     Procalcitonin:      Pertinent Cultures:  Culture Date Source Results   2/16 2/16 2/16 Urine  Blood x  2  Buttock Wound 10-50,000 ESBL E Coli  Pending  Normal Marizol   MRSA Nasal Swab: N/A. Non-respiratory infection.    Recent vancomycin administrations                     vancomycin (VANCOCIN) 1250 mg in 250 mL IVPB (mg) 1,250 mg New Bag 02/17/25 0443     1,250 mg New Bag 02/16/25 1453     1,250 mg New Bag  0357    vancomycin (VANCOCIN) 2,250 mg in sodium chloride 0.9 % 500 mL IVPB (mg) 2,250 mg New Bag 02/15/25 1413                    Assessment:  Date/Time Current Dose Concentration Timing of Concentration (h) AUC   2/17 1250 mg 17.3 1149 473   Note: Serum concentrations collected for AUC dosing may appear elevated if collected in close proximity to the dose administered, this is not necessarily an indication of toxicity    Plan:  Current dosing regimen is therapeutic  Continue current dose  Pharmacy will continue to monitor patient and adjust therapy as indicated  Loading dose: N/A  Regimen: 1250 mg IV every 12 hours.  Start time: 16:43 on 02/17/2025  Exposure target: AUC24 (range)400-600 mg/L.hr   [id number]: 472 mg/L.hr  AUC24,ss: 473 mg/L.hr  Probability of AUC24 > 400: 99 %  Ctrough,ss: 12.3 mg/L  Probability of Ctrough,ss > 20: 0 %    Thank you for the consult,  Valerio Hendrix, 
[facility]   Pharmacy Pharmacokinetic Monitoring Service - Vancomycin    Consulting Provider: Dr. Thorne   Indication: SSTI  Target Concentration: Goal trough of 10-15 mg/L and AUC/JAVED <500 mg*hr/L  Day of Therapy: 2  Additional Antimicrobials: zosyn    Pertinent Laboratory Values:   Wt Readings from Last 1 Encounters:   02/15/25 94.3 kg (208 lb)     Temp Readings from Last 1 Encounters:   02/16/25 97.9 °F (36.6 °C) (Oral)     Estimated Creatinine Clearance: 142 mL/min (A) (based on SCr of 0.6 mg/dL (L)).  Recent Labs     02/15/25  1230   CREATININE 0.6*   BUN 11   WBC 5.2     Procalcitonin: 0.08 on 02/15    Pertinent Cultures: see micro   MRSA Nasal Swab: N/A. Non-respiratory infection.    Recent vancomycin administrations                     vancomycin (VANCOCIN) 1250 mg in 250 mL IVPB (mg) 1,250 mg New Bag 02/16/25 0357    vancomycin (VANCOCIN) 2,250 mg in sodium chloride 0.9 % 500 mL IVPB (mg) 2,250 mg New Bag 02/15/25 1413                    Assessment:  Date/Time Current Dose Concentration Timing of Concentration (h) AUC   02/16 1250 mg IVPB Q12H -- -- --   Note: Serum concentrations collected for AUC dosing may appear elevated if collected in close proximity to the dose administered, this is not necessarily an indication of toxicity    Plan:  Current dosing regimen is therapeutic  Continue current dose  Repeat vancomycin concentration ordered for 02/17 @ 1200   Pharmacy will continue to monitor patient and adjust therapy as indicated    Loading dose: N/A  Regimen: 1250 mg IV every 12 hours.  Start time: 15:57 on 02/16/2025  Exposure target: AUC24 (range)400-600 mg/L.hr   [id number]: 489 mg/L.hr  AUC24,ss: 500 mg/L.hr  Probability of AUC24 > 400: 85 %  Ctrough,ss: 13.4 mg/L  Probability of Ctrough,ss > 20: 8 %      Thank you for the consult,  Roma Montgomery [facility]  2/16/2025 8:25 AM   
[facility]   Pharmacy Pharmacokinetic Monitoring Service - Vancomycin     Krista Chiang is a 44 y.o. female starting on vancomycin therapy for SSTI x 7 days. Pharmacy consulted by Dr. Jori Thorne for monitoring and adjustment.    Target Concentration: Goal AUC/JAVED 400-600 mg*hr/L    Additional Antimicrobials: Zosyn, Ceftriaxone x1    Pertinent Laboratory Values:   Wt Readings from Last 1 Encounters:   02/15/25 94.3 kg (208 lb)     Temp Readings from Last 1 Encounters:   02/15/25 98.6 °F (37 °C) (Oral)     Estimated Creatinine Clearance: 142 mL/min (A) (based on SCr of 0.6 mg/dL (L)).  Recent Labs     02/15/25  1230   CREATININE 0.6*   BUN 11   WBC 5.2       Pertinent Cultures:  Review Micro    MRSA Nasal Swab: N/A. Non-respiratory infection.      Plan:  Dosing recommendations based on Bayesian software  Start vancomycin 2250 mg IVPB x1 then 1250 mg IVPB Q12H  Renal labs as indicated   Vancomycin concentration ordered for 02/17 @ 1200   Pharmacy will continue to monitor patient and adjust therapy as indicated    Thank you for the consult,  Siddharth Barnes RPH  2/15/2025 3:52 PM   
[facility]   INPATIENT OCCUPATIONAL THERAPY  PROGRESS NOTE  Date: 2025  Patient Name: Krista Chiang       Room:   MRN: [id number]    : 1980  (44 y.o.)  Gender: female   Referring Practitioner: Jori Thorne MD  Diagnosis: Wound dehiscence      Discharge Recommendations:  Further Occupational Therapy is recommended upon facility discharge.    OT Equipment Recommendations  Other: Pt confirms TTB, tub GB, BSC placed over toilet with anchored GB in front of toilet. Pt also confirtms sock aid and reacher. Will CTA pt DME needs    Restrictions/Precautions  Restrictions/Precautions  Restrictions/Precautions: Fall Risk;Isolation (ESBL, VRE, CRE, MDRO, MRSA, acinetobacter., peripheral IV left posterior hand)  Activity Level: Up as Tolerated;Up with Assist  Required Braces or Orthoses?: Yes (just got shoes which will be modified w/ Lift)  Implants Present? : Metal implants (VC filter, cerclage wires left residual femur)  Position Activity Restriction  Other Position/Activity Restrictions: Hx of LLE girdlestone procedure : NO LEFT HIP JOINT (THR PROSTHESIS TAKEN OUT);  Pt reports no current weight restrictions left LE. Awaiting lift or insert for left shoe (approximately 5\") to be placed- placed order for shoes and awaiting arrival    O2 Device: None (Room air)    Subjective  Subjective  Subjective: \"I've been waiting on my food for about an hour and a half.\" Pt reporting certain foods on tray was not recieved. Upon arrival pt and RN on phone with dietary. \"Sorry I'm just not in a good mood.\"  Pain  Pre-Pain:  (does not formally rate pain however facial grimaces noted with movement)  Post-Pain:  (does not formally rate pain however facial grimaces noted with movement)  Pain Location: Left (hip and inner R thigh)  Pain Interventions: Rest;Repositioning       Objective  Orientation  Overall Orientation Status: Within Functional Limits  Orientation Level: Oriented to 
[facility] Contin[facility] Nurse  Consult Note       NAME:  Krista Chiang  MEDICAL RECORD NUMBER:  [id number]  AGE: 44 y.o.   GENDER: female  : 1980  TODAY'S DATE:  2025    Subjective:      Krista Chiang is a 44 y.o. female with inpatient referral to Wound Ostomy Continence Specialty for:  Multiple wounds      Wound Identification:  Wound Type: non-healing surgical and new wounds of unknown origin to abdomen and right medial thigh  Contributing Factors: diabetes, chronic pressure, decreased mobility, shear force, obesity, and anticoagulation therapy    Wound History: The patient is known to [facility] service as she has a longstanding left hip incisional ulcer that has closed and reopened multiple times.  She now has 3 new ulcerations on her abdomen and right medial thigh they are exquisitely painful especially to manipulation and are at least partially covered with slough tissue.  Patient states these wounds are about 1 week old and are the reason she came to the hospital  Current Wound Care Treatment: General Surgery team had been consulted and ordered Silvadene cream for the new wounds    Patient Goal of Care:  [x] Wound Healing  [] Odor Control  [] Palliative Care  [] Pain Control   [x] Other: infection prevention        PAST MEDICAL HISTORY        Diagnosis Date    Alcohol abuse     Recurrent episodes of withdrawal    Alcoholic hepatitis without ascites     Antiphospholipid syndrome (HCC)     hypercoagulable state    Anxiety     Arthritis 2013    Painting esophagus 2021    Bipolar 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 2015    Fibromyalgia     Genital herpes, unspecified     GERD (gastroesophageal reflux disease)     H/O bariatric surgery 2013    Awa and Y    History of pulmonary embolism     Hx of blood clots     clots in both legs and lungs  
New consult for both I&D and general surgery complete.  
No [facility] AS OF 9:38AM 2/19/25 [employer]  
Patient refuses dressing change at this time, currently too painful  
Physical Therapy        Physical Therapy Cancel Note      DATE: 2025    NAME: Krista Chiang  MRN: [id number]   : 1980      Patient not seen this date for Physical Therapy due to:    Patient Declined: Cx; patient declining PT intervention due to uncontrolled diarhhea. Writer plans to pursue at a later daate. Will continue to follow for patient updates/needs.      Electronically signed by Reyna Barclay PTA on 2025 at 11:44 AM      
Physical Therapy  [facility]   Physical Therapy Evaluation  Date: 25  Patient Name: Krista Chiang       Room:   MRN: [id number]  Account: [id number]   : 1980  (44 y.o.) Gender: female     Discharge Recommendations:  Discharge Recommendations: Patient would benefit from continued therapy after discharge, Therapy recommended at discharge     PT Equipment Recommendations  Equipment Needed:  (TBD)     Past Medical History:  has a past medical history of Alcohol abuse, Alcoholic hepatitis without ascites, Antiphospholipid syndrome (HCC), Anxiety, Arthritis, Painting esophagus, Bipolar disorder, unspecified (HCC), 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 encounter, Constipation, Depression, Fibromyalgia, Genital herpes, unspecified, GERD (gastroesophageal reflux disease), H/O bariatric surgery, History of pulmonary embolism, Hx of blood clots, Hypertension, Lives in nursing home, Lumbosacral spondylosis without myelopathy, MDRO (multiple drug resistant organisms) resistance, Mobility impaired, MRSA (methicillin resistant staph aureus) culture positive, Muscle weakness, Obsessive-compulsive disorder, unspecified, Opioid abuse, in remission ([facility]), Pernicious anemia, Polyneuropathy, unspecified, PTSD (post-traumatic stress disorder), Pulmonary embolism ([facility]), Suicidal behavior, SVT (supraventricular tachycardia) ([facility]), Type 2 diabetes mellitus, with long-term current use of insulin ([facility]), Under care of team, and Under care of team.  Past Surgical History:   has a past surgical history that includes Cholecystectomy; Liver Resection (); Tonsillectomy; Abdominal hernia repair (); Abdominal hernia repair (2014); Bariatric Surgery (2013); Dilation and curettage of uterus (); Finger amputation (Left, 2015); lymph node biopsy (); Total abdominal hysterectomy w/ bilateral salpingoophorectomy (); IVC 
Physical Therapy  [facility]   Physical Therapy Treatment  Date: 25  Patient Name: Krista Chiang       Room:   MRN: [id number]  Account: [id number]   : 1980  (44 y.o.) Gender: female     Discharge Recommendations:  Discharge Recommendations: Patient would benefit from continued therapy after discharge, Therapy recommended at discharge     PT Equipment Recommendations  Equipment Needed:  (TBD)    General  Patient assessed for rehabilitation services?: Yes  Additional Pertinent Hx: per H & P note:The patient is a 44 y.o.  Non- / non  female who presents with Wound Dehiscence   and she is admitted to the hospital for the management of wound dehiscence and possible infection.     Patient has a past medical history of left hip arthroplasty (complicated with infection, hardware removed, got infected, on doxycycline until 2025 for Klebsiella infection, previous infection of MRSA, and VRE), s/p left fractured tibia and fibula, SVT on Cardizem, uncontrolled T2DM on insulin, DVT s/p IVC filter, antiphospholipid syndrome (on fondaparinux).     Today patient presented to ER with 3 open wounds located in posterior left buttock, right medial thigh and lower abdomen.  Per patient, her buttock wound wound had not been healing since 19 months, and this past Monday she noticed her wounds were opening up as well as new wound on lower abdomen and medial right thigh.  She reports severe pain as well as drainage from the wounds.  Denies fever, chills, bloody discharge or any other associated symptoms.  Patient also reports increased urinary frequency and urgency, denies dysuria, burning frequent sensation of any other associated UTI.     In ER, CBC negative for leukocytosis, BMP significant for low potassium 3.4, otherwise unremarkable.  Pro-Ej, ESR and CRP pending. Blood culture, wound culture pending.  Urinalysis and urine culture pending.     ID, wound care and 
Wound Ostomy Continence Nursing        NAME:  Krista Chiang  MEDICAL RECORD NUMBER:  [id number]  AGE: 44 y.o.   GENDER: female  : 1980  TODAY'S DATE:  2025      [facility] nursing consulted for multiple wounds.  Dr. Guajardo has seen the patient and provide wound care orders.  Nursing is requesting that I still see the patient.  Attempted visit and nursing had just changed the dressings prior to my visit.  Will plan on eval visit tomorrow.    Anais SPRAGUEC, CWS, CWOCN-AP  [facility]  Wound and Ostomy Services  [phone]         
multivitamin  1 tablet Oral Daily    zinc sulfate  50 mg Oral Daily    ascorbic acid  500 mg Oral Daily    silver sulfADIAZINE   Topical BID    meropenem  1,000 mg IntraVENous Q8H    insulin glargine  27 Units SubCUTAneous Nightly    insulin lispro  0-4 Units SubCUTAneous 4x Daily AC & HS    buPROPion  150 mg Oral QAM    dilTIAZem  120 mg Oral Daily    FLUoxetine  40 mg Oral Daily    fondaparinux  7.5 mg SubCUTAneous Daily    pantoprazole  40 mg Oral QAM AC    sucralfate  1 g Oral 4x Daily    scopolamine  1 patch TransDERmal Q72H    sodium chloride flush  5-40 mL IntraVENous 2 times per day     Continuous Infusions:    dextrose      sodium chloride       PRN Meds: LORazepam, gabapentin, oxyCODONE-acetaminophen **AND** oxyCODONE, LORazepam, morphine, ondansetron, bumetanide, tiZANidine, glucose, dextrose bolus **OR** dextrose bolus, glucagon (rDNA), dextrose, sodium chloride flush, sodium chloride, potassium chloride **OR** potassium alternative oral replacement **OR** potassium chloride, magnesium sulfate, polyethylene glycol, acetaminophen **OR** acetaminophen, midodrine    Data:     Past Medical History:   has a past medical history of Alcohol abuse, Alcoholic hepatitis without ascites, Antiphospholipid syndrome ([facility]), Anxiety, Arthritis, Painting esophagus, Bipolar disorder, unspecified ([facility]), 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 encounter, Constipation, Depression, Fibromyalgia, Genital herpes, unspecified, GERD (gastroesophageal reflux disease), H/O bariatric surgery, History of pulmonary embolism, Hx of blood clots, Hypertension, Lives in nursing home, Lumbosacral spondylosis without myelopathy, MDRO (multiple drug resistant organisms) resistance, Mobility impaired, MRSA (methicillin resistant staph aureus) culture positive, Muscle weakness, Obsessive-compulsive disorder, unspecified, Opioid abuse, in remission ([facility]), Pernicious anemia, Polyneuropathy, unspecified, 
Independent  Supine to Sit: Supervision (Pt leans posterior in sitting, unable to get to complete upright position, d/t removal of L hip hardware.)  Sit to Supine: Contact guard assistance (for assist of L LE onto bed.)  Scooting: Supervision         PT Exercises  A/AROM Exercises: 10 reps heel slides, hip abd on R SBA  Circulation/Endurance Exercises: 10 reps (B) ankle pumps SBA,  Dynamic Standing Balance Exercises: Pt able to stand wash hands @ sink, supervision for balance.       Activity Tolerance: Patient limited by endurance, Patient limited by fatigue     Patient Education  Education Given To: Patient  Education Provided: Home Exercise Program, Transfer Training, Mobility Training, Fall Prevention Strategies  Education Method: Demonstration, Verbal  Barriers to Learning: None  Education Outcome: Continued education needed     Functional Outcome Measures  AM-PAC Basic Mobility - Inpatient   How much help is needed turning from your back to your side while in a flat bed without using bedrails?: A Little  How much help is needed moving from lying on your back to sitting on the side of a flat bed without using bedrails?: A Little  How much help is needed moving to and from a bed to a chair?: A Little  How much help is needed standing up from a chair using your arms?: A Little  How much help is needed walking in hospital room?: A Little  How much help is needed climbing 3-5 steps with a railing?: Total  AM-PAC Inpatient Mobility Raw Score : 16  AM-PAC Inpatient T-Scale Score : 40.78  Mobility Inpatient CMS 0-100% Score: 54.16  Mobility Inpatient CMS G-Code Modifier : CK     Goals  Patient Goals   Patient Goals : be able to safely climb steps  Short Term Goals  Time Frame for Short Term Goals: (5-7 treatments/ week  Short Term Goal 1: Pt to demonstrate an increased tolerance to theraputic activity for 30-40 minutes to promote an increase in functional endurance  Short Term Goal 2: Pt to demonstrate MOD I with bed 
space.    Impression  1. No significant change compared to prior.  Similar appearance following  left total hip arthroplasty removal with osseous erosions along the  acetabulum and proximal femur and soft tissue thickening within the expected  hip joint space.  2. No focal drainable fluid collection within the lateral hip soft tissues.  3. Incompletely healed proximal femoral fracture secured by cerclage wires.      Hospital Problems             Last Modified POA    * (Principal) Wound dehiscence 2/15/2025 Yes    Antiphospholipid syndrome (Chronic) 2/18/2025 Yes    Diabetes mellitus (HCC) 2/18/2025 Yes    Mild protein-calorie malnutrition 2/18/2025 Yes    Nonhealing surgical wound, subsequent encounter 2/18/2025 Yes    Wound infection 2/18/2025 Yes    Chronic wound 2/18/2025 Yes    Rupture of operation wound 2/18/2025 Yes    Skin ulcer of abdomen with fat layer exposed (HCC) 2/18/2025 Yes    Skin ulcer of thigh, right, with fat layer exposed (HCC) 2/18/2025 Yes         ASSESSMENT   44 y.o. female with longstanding wounds to the left hip, superior and inferior midline abdomen and right inner thigh, also nonhealing incision from left hip replacement hardware removal and excision lateral thigh  Patient with recent positive wound culture for MRSA and longstanding wounds as discussed above  Patient with extensive history involving wound closure and muscular coverage of bone and wound in her left hip after bone removal and hardware removal due to infection with MRSA and MDRO  Patient is status post partial hepatectomy for liver adenoma and has a history of cirrhosis, extensive medical history with multiple comorbidities  Lab work from today is reviewed.  CRP is improved at 9.7.  Creatinine 0.5.  Sodium 140.  Potassium 4.6.  White blood cell count 6.6.  Hemoglobin 11.  Platelets 329.  Vital signs stable, afebrile    PLAN  Wounds appear clean, no acute surgical intervention at this time  Continue dressing changes with

## 2025-02-20 NOTE — CONSULTS
Consulted to assess patient's midline. Midline is reportedly not flushing. Upon arrival to the patients side the pt has a dual lumen midline in the right upper extremity. The white port is accessed and infusing medication per the IV pump. The pink lumen is not accessed, however there is visible blood in the tubing and it is clamped. I did attempt to flush the pink lumen but was unable. I did remove the cap of the pink lumen and change the cap.    I assessed the vessel with ultrasound. I was able to visualize the midline catheter in the right brachial vein. I followed the catheter up the vessel. The racial vein appears non compressible just distal of the catheter tip. There does appear to be an echogenic density that does not appear to be the midline catheter.    These findings were discussed with Cathy, the patient bedside nurse today. I recommended the attending be notified of these findings and doppler be considered of the RUE.

## 2025-02-20 NOTE — PROCEDURES
PROCEDURE NOTE  Date: 2/20/2025   Name: Krista Chiang  YOB: 1980    Procedures    Midline insertion note:     Prescribed therapy: meropenum  Product type: 4.5fr single lumen Antimicrobial/Antithrombogenic Arrow Midline  History/Labs/Allergies Reviewed  Placed By:   Greta Rocha RN IV Team  Time out Performed using Two Identifiers  External catheter length: 0 cm  Extremity circumference at insertion site: 43 cm  Number of attempts: 1  Estimated blood loss: 1 ml  Placement verified by: positive blood return & flushes easily  Special equipment used: ultrasound & micro-introducer technique   Catheter secured with adhesive securement device  Catheter adhesive (SecureportIV) utilized at insertion site  Dressing applied: Tegaderm CHG  Lidocaine administered intradermally conc.1%: approx 1 mL    Midline education:     [x] Post care line insertion was discussed with patient/Family or POA prior to procedure.  Risks, benefits, alternatives, and reason for procedure were discussed and teaching was reinforced. An educational handout on post insertion line care and maintenance was left at bedside with patient or in chart. Patient (Family or POA) acknowledged understanding of information relayed.

## 2025-02-21 ENCOUNTER — CARE COORDINATION (OUTPATIENT)
Dept: CARE COORDINATION | Age: 45
End: 2025-02-21

## 2025-02-21 LAB — ECHO BSA: 2.13 M2

## 2025-02-21 NOTE — DISCHARGE SUMMARY
[facility]   IN-PATIENT SERVICE   [facility]    Discharge Summary     Patient ID: rKista Chiang  :  1980   MRN: [id number]     ACCOUNT:  [id number]   Patient's PCP: Arielle Melton APRN - NP  Admit Date: 2/15/2025   Discharge Date: 2025     Length of Stay: 5  Code Status:  Prior  Admitting Physician: Crystal Estrada MD  Discharge Physician: Xochitl Torre MD     Active Discharge Diagnoses:       Primary Problem  Wound dehiscence      Hospital Problems  Active Hospital Problems    Diagnosis Date Noted    Wound infection [T14.8XXA, L08.9] 2025    Chronic wound [T14.8XXA] 2025    Rupture of operation wound [T81.31XA] 2025    Skin ulcer of abdomen with fat layer exposed (HCC) [L98.492] 2025    Skin ulcer of thigh, right, with fat layer exposed (HCC) [L97.112] 2025    Nonhealing surgical wound, subsequent encounter [T81.89XD] 2024    Wound dehiscence [T81.30XA] 2023    Diabetes mellitus (HCC) [E11.9] 2023    Mild protein-calorie malnutrition [E44.1] 2023    Antiphospholipid syndrome [D68.61]        Admission Condition:  fair     Discharged Condition: fair    Hospital Stay:     Hospital Course: Ms. Chiang is a 44-year-old female who was admitted for management of UTI and chronic wounds on . UA suggested UTI. Urine culture grew E. coli sensitive only to meropenem. ID recommended meropenem for 7 days. General surgery were consulted; who said that the wounds are clean and that the patient does not require debridement.  She had frequent bowel movements which responded well to loperamide.  She is discharged on Flagyl 500 mg 3 times daily for 7 days and to continue meropenem until 2025.    Significant therapeutic interventions: IV meropenem    Significant Diagnostic Studies:   Labs / Micro:  CBC:   Lab Results   Component Value Date/Time    WBC 6.6 2025 10:52 AM    RBC 4.05 2025 10:52 AM

## 2025-02-21 NOTE — CARE COORDINATION
Care Transitions Note    Initial Call - Call within 2 business days of discharge: Yes    Patient Current Location:  Home: [address]  [place] [address]    Care Transition Nurse contacted the patient by telephone to perform post hospital discharge assessment, verified name and  as identifiers. Provided introduction to self, and explanation of the Care Transition Nurse role.     Patient: Krista Chiang    Patient : 1980   MRN: [id number]    Reason for Admission: Wound infection   Discharge Date: 25  RURS: Readmission Risk Score: 28.7      Last Discharge Facility       Date Complaint Diagnosis Description Type Department Provider    2/15/25 Wound Dehiscence Wound infection ... ED to Hosp-Admission (Discharged) (ADMITTED) [facility] Raul Bledsoe MD; [place], ...            Was this an external facility discharge? No    Additional needs identified to be addressed with provider   Patient was using Silvadene cream to wounds, does not have any for home use             Method of communication with provider: staff message.    Patients top risk factors for readmission: medical condition-DM, non healing wounds    Interventions to address risk factors:   Education: Medications as ordered, monitor for new s/s of infection, malodorous drainage, f/c, redness around wound sites, follow up with ID and PCP    Care Summary Note: Spoke with patient today for initial 24 hour follow up.  Patient was in the hospital for wound dehiscence.  She had left hip surgery about 19 months ago, had left hip replacement, wound up having infected hardware that was removed and has been having issues with non healing wounds.  She is also diabetic and has history of gastric bypass in .  Patient was treated for her wounds, no surgical interventions were done and she discharged to home on IV ATB with Eugenio to follow.  Today, she said she just finished administering her ATB with home care nurse who just

## 2025-02-21 NOTE — DISCHARGE INSTRUCTIONS
Please take meropenem until 2/24/2020  4 please take Flagyl 500 mg 3 times a day for 7 days    Please follow-up with PCP in 1 week  Please follow-up with Dr. Flannery on 3/12/2025 at 10:30 AM    -If you begin to experience any symptoms such as chest pain, shortness of breath, nausea, vomiting, dizziness, drowsiness, abdominal pain, loss of consciousness, or any other symptoms you find concerning please return to the ED for follow-up evaluation.  -If you have been given medication please take them as prescribed. Do not take more medication than recommended at any given time. Finish all antibiotic  -Please follow-up with your primary care provider within 7 days for continued care.   -Please feel free return to the hospital if your symptoms worsen or any new concerning symptoms develop.  Follow-up with your primary care physician as needed for all other the concerns.

## 2025-02-27 ENCOUNTER — CARE COORDINATION (OUTPATIENT)
Dept: CARE COORDINATION | Age: 45
End: 2025-02-27

## 2025-02-27 NOTE — CARE COORDINATION
Care Transitions Note    Follow Up Call     Attempted to reach patient for transitions of care follow up.  Unable to reach patient.      Outreach Attempts:   Multiple attempts to contact patient at phone numbers on file.     Care Summary Note: Patient being followed after admission for wound dehiscence.  Initial call done last week, message was sent to Dr. Flannery requesting Silvadene cream however I do not see order.  Patient was to have completed IV ATB on Monday.  Will attempt to reach tomorrow.     Follow Up Appointment:   Future Appointments         Provider Specialty Dept Phone    3/12/2025 10:30 AM Jerome Flannery MD Infectious Diseases [phone]            Plan for follow-up on next business day.  based on severity of symptoms and risk factors. Plan for next call:  How are wounds looking? Did she get the Silvadene? Is midline out now? How are blood sugars.     Rosalie Merlos RN

## 2025-02-28 ENCOUNTER — CARE COORDINATION (OUTPATIENT)
Dept: CARE COORDINATION | Age: 45
End: 2025-02-28

## 2025-02-28 NOTE — CARE COORDINATION
Care Transitions Note    Follow Up Call     Attempted to reach patient for transitions of care follow up.  Unable to reach patient.      Outreach Attempts:   Multiple attempts to contact patient at phone numbers on file.     Care Summary Note: Patient being followed for wound dehiscence, initial call made last week. Attempted to reach patient x 2. She follows with non [facility] provider. Next appointment with Dr. Flannery on 3/12.     Follow Up Appointment:   Future Appointments         Provider Specialty Dept Phone    3/12/2025 10:30 AM Jerome Flannery MD Infectious Diseases [phone]            No further follow-up call indicated based on severity of symptoms and risk factors.     Rosalie Merlos, RN

## 2025-03-09 ENCOUNTER — HOSPITAL ENCOUNTER (INPATIENT)
Age: 45
LOS: 6 days | Discharge: HOME HEALTH | DRG: 637 | End: 2025-03-16
Attending: STUDENT IN AN ORGANIZED HEALTH CARE EDUCATION/TRAINING PROGRAM | Admitting: INTERNAL MEDICINE
Payer: COMMERCIAL

## 2025-03-09 ENCOUNTER — APPOINTMENT (OUTPATIENT)
Dept: GENERAL RADIOLOGY | Age: 45
DRG: 637 | End: 2025-03-09
Payer: COMMERCIAL

## 2025-03-09 DIAGNOSIS — E11.10 DIABETIC KETOACIDOSIS WITHOUT COMA ASSOCIATED WITH TYPE 2 DIABETES MELLITUS: ICD-10-CM

## 2025-03-09 DIAGNOSIS — I47.20 WIDE QRS VENTRICULAR TACHYCARDIA (HCC): ICD-10-CM

## 2025-03-09 DIAGNOSIS — L97.112: ICD-10-CM

## 2025-03-09 DIAGNOSIS — R42 DIZZINESS: Primary | ICD-10-CM

## 2025-03-09 DIAGNOSIS — R52 INTRACTABLE PAIN: ICD-10-CM

## 2025-03-09 DIAGNOSIS — R11.2 NAUSEA AND VOMITING, UNSPECIFIED VOMITING TYPE: ICD-10-CM

## 2025-03-09 DIAGNOSIS — L98.492 SKIN ULCER OF ABDOMEN WITH FAT LAYER EXPOSED (HCC): ICD-10-CM

## 2025-03-09 LAB
ANION GAP SERPL CALCULATED.3IONS-SCNC: 19 MMOL/L (ref 9–16)
BASOPHILS # BLD: 0 K/UL (ref 0–0.2)
BASOPHILS NFR BLD: 1 % (ref 0–2)
BUN SERPL-MCNC: 8 MG/DL (ref 6–20)
CALCIUM SERPL-MCNC: 8.6 MG/DL (ref 8.6–10.4)
CHLORIDE SERPL-SCNC: 102 MMOL/L (ref 98–107)
CO2 SERPL-SCNC: 14 MMOL/L (ref 20–31)
CREAT SERPL-MCNC: 0.9 MG/DL (ref 0.7–1.2)
EOSINOPHIL # BLD: 0 K/UL (ref 0–0.4)
EOSINOPHILS RELATIVE PERCENT: 1 % (ref 0–4)
ERYTHROCYTE [DISTWIDTH] IN BLOOD BY AUTOMATED COUNT: 20.5 % (ref 11.5–14.9)
GFR, ESTIMATED: 80 ML/MIN/1.73M2
GLUCOSE SERPL-MCNC: 651 MG/DL (ref 74–99)
HCT VFR BLD AUTO: 42.8 % (ref 36–46)
HGB BLD-MCNC: 12.8 G/DL (ref 12–16)
LYMPHOCYTES NFR BLD: 1.4 K/UL (ref 1–4.8)
LYMPHOCYTES RELATIVE PERCENT: 31 % (ref 24–44)
MCH RBC QN AUTO: 27.9 PG (ref 26–34)
MCHC RBC AUTO-ENTMCNC: 29.9 G/DL (ref 31–37)
MCV RBC AUTO: 93.2 FL (ref 80–100)
MONOCYTES NFR BLD: 0.4 K/UL (ref 0.1–1.3)
MONOCYTES NFR BLD: 9 % (ref 1–7)
NEUTROPHILS NFR BLD: 58 % (ref 36–66)
NEUTS SEG NFR BLD: 2.6 K/UL (ref 1.3–9.1)
PLATELET # BLD AUTO: 345 K/UL (ref 150–450)
PMV BLD AUTO: 10.3 FL (ref 6–12)
POTASSIUM SERPL-SCNC: 4 MMOL/L (ref 3.7–5.3)
RBC # BLD AUTO: 4.59 M/UL (ref 4–5.2)
SODIUM SERPL-SCNC: 135 MMOL/L (ref 136–145)
TROPONIN I SERPL HS-MCNC: 14 NG/L (ref 0–14)
WBC OTHER # BLD: 4.4 K/UL (ref 3.5–11)

## 2025-03-09 PROCEDURE — 82010 KETONE BODYS QUAN: CPT

## 2025-03-09 PROCEDURE — 2580000003 HC RX 258

## 2025-03-09 PROCEDURE — 99285 EMERGENCY DEPT VISIT HI MDM: CPT

## 2025-03-09 PROCEDURE — 83036 HEMOGLOBIN GLYCOSYLATED A1C: CPT

## 2025-03-09 PROCEDURE — 71045 X-RAY EXAM CHEST 1 VIEW: CPT

## 2025-03-09 PROCEDURE — 84100 ASSAY OF PHOSPHORUS: CPT

## 2025-03-09 PROCEDURE — 96374 THER/PROPH/DIAG INJ IV PUSH: CPT

## 2025-03-09 PROCEDURE — 36415 COLL VENOUS BLD VENIPUNCTURE: CPT

## 2025-03-09 PROCEDURE — 80048 BASIC METABOLIC PNL TOTAL CA: CPT

## 2025-03-09 PROCEDURE — 83735 ASSAY OF MAGNESIUM: CPT

## 2025-03-09 PROCEDURE — 96375 TX/PRO/DX INJ NEW DRUG ADDON: CPT

## 2025-03-09 PROCEDURE — 84484 ASSAY OF TROPONIN QUANT: CPT

## 2025-03-09 PROCEDURE — 6360000002 HC RX W HCPCS

## 2025-03-09 PROCEDURE — 73502 X-RAY EXAM HIP UNI 2-3 VIEWS: CPT

## 2025-03-09 PROCEDURE — 85025 COMPLETE CBC W/AUTO DIFF WBC: CPT

## 2025-03-09 PROCEDURE — 93005 ELECTROCARDIOGRAM TRACING: CPT

## 2025-03-09 RX ORDER — MAGNESIUM SULFATE HEPTAHYDRATE 40 MG/ML
2000 INJECTION, SOLUTION INTRAVENOUS PRN
Status: DISCONTINUED | OUTPATIENT
Start: 2025-03-09 | End: 2025-03-10

## 2025-03-09 RX ORDER — POTASSIUM CHLORIDE 7.45 MG/ML
10 INJECTION INTRAVENOUS PRN
Status: DISCONTINUED | OUTPATIENT
Start: 2025-03-09 | End: 2025-03-10

## 2025-03-09 RX ORDER — ONDANSETRON 2 MG/ML
4 INJECTION INTRAMUSCULAR; INTRAVENOUS ONCE
Status: COMPLETED | OUTPATIENT
Start: 2025-03-09 | End: 2025-03-09

## 2025-03-09 RX ORDER — DEXTROSE MONOHYDRATE, SODIUM CHLORIDE, AND POTASSIUM CHLORIDE 50; 1.49; 4.5 G/1000ML; G/1000ML; G/1000ML
INJECTION, SOLUTION INTRAVENOUS CONTINUOUS PRN
Status: DISCONTINUED | OUTPATIENT
Start: 2025-03-09 | End: 2025-03-10 | Stop reason: SDUPTHER

## 2025-03-09 RX ORDER — 0.9 % SODIUM CHLORIDE 0.9 %
1000 INTRAVENOUS SOLUTION INTRAVENOUS ONCE
Status: COMPLETED | OUTPATIENT
Start: 2025-03-09 | End: 2025-03-10

## 2025-03-09 RX ORDER — SODIUM CHLORIDE 450 MG/100ML
INJECTION, SOLUTION INTRAVENOUS CONTINUOUS
Status: DISCONTINUED | OUTPATIENT
Start: 2025-03-09 | End: 2025-03-10

## 2025-03-09 RX ADMIN — SODIUM CHLORIDE 1000 ML: 0.9 INJECTION, SOLUTION INTRAVENOUS at 23:40

## 2025-03-09 RX ADMIN — ONDANSETRON 4 MG: 2 INJECTION, SOLUTION INTRAMUSCULAR; INTRAVENOUS at 23:38

## 2025-03-09 RX ADMIN — HYDROMORPHONE HYDROCHLORIDE 0.5 MG: 1 INJECTION, SOLUTION INTRAMUSCULAR; INTRAVENOUS; SUBCUTANEOUS at 23:37

## 2025-03-09 ASSESSMENT — PAIN - FUNCTIONAL ASSESSMENT: PAIN_FUNCTIONAL_ASSESSMENT: 0-10

## 2025-03-09 ASSESSMENT — PAIN SCALES - GENERAL: PAINLEVEL_OUTOF10: 7

## 2025-03-10 ENCOUNTER — APPOINTMENT (OUTPATIENT)
Dept: CT IMAGING | Age: 45
DRG: 637 | End: 2025-03-10
Payer: COMMERCIAL

## 2025-03-10 PROBLEM — E10.10 DKA, TYPE 1, NOT AT GOAL (HCC): Status: ACTIVE | Noted: 2025-03-10

## 2025-03-10 PROBLEM — Z98.84 S/P GASTRIC BYPASS: Status: ACTIVE | Noted: 2019-02-23

## 2025-03-10 PROBLEM — R42 DIZZINESS: Status: ACTIVE | Noted: 2025-03-10

## 2025-03-10 PROBLEM — R19.7 DIARRHEA: Status: ACTIVE | Noted: 2025-03-10

## 2025-03-10 LAB
ANION GAP SERPL CALCULATED.3IONS-SCNC: 10 MMOL/L (ref 9–16)
ANION GAP SERPL CALCULATED.3IONS-SCNC: 12 MMOL/L (ref 9–16)
B-OH-BUTYR SERPL-MCNC: 0.18 MMOL/L (ref 0.02–0.27)
BILIRUB UR QL STRIP: NEGATIVE
BODY TEMPERATURE: 37
BUN SERPL-MCNC: 3 MG/DL (ref 6–20)
BUN SERPL-MCNC: 4 MG/DL (ref 6–20)
CALCIUM SERPL-MCNC: 7.6 MG/DL (ref 8.6–10.4)
CALCIUM SERPL-MCNC: 8.1 MG/DL (ref 8.6–10.4)
CHLORIDE SERPL-SCNC: 110 MMOL/L (ref 98–107)
CHLORIDE SERPL-SCNC: 110 MMOL/L (ref 98–107)
CLARITY UR: CLEAR
CO2 SERPL-SCNC: 18 MMOL/L (ref 20–31)
CO2 SERPL-SCNC: 19 MMOL/L (ref 20–31)
COHGB MFR BLD: 3.4 % (ref 0–5)
COLOR UR: YELLOW
COMMENT: ABNORMAL
CREAT SERPL-MCNC: 0.4 MG/DL (ref 0.7–1.2)
CREAT SERPL-MCNC: 0.4 MG/DL (ref 0.7–1.2)
EKG ATRIAL RATE: 58 BPM
EKG P AXIS: 79 DEGREES
EKG P-R INTERVAL: 100 MS
EKG Q-T INTERVAL: 456 MS
EKG QRS DURATION: 104 MS
EKG QTC CALCULATION (BAZETT): 447 MS
EKG R AXIS: 95 DEGREES
EKG T AXIS: 29 DEGREES
EKG VENTRICULAR RATE: 58 BPM
EST. AVERAGE GLUCOSE BLD GHB EST-MCNC: 249 MG/DL
GFR, ESTIMATED: >90 ML/MIN/1.73M2
GFR, ESTIMATED: >90 ML/MIN/1.73M2
GLUCOSE BLD-MCNC: 102 MG/DL (ref 65–105)
GLUCOSE BLD-MCNC: 120 MG/DL (ref 65–105)
GLUCOSE BLD-MCNC: 122 MG/DL (ref 65–105)
GLUCOSE BLD-MCNC: 124 MG/DL (ref 65–105)
GLUCOSE BLD-MCNC: 128 MG/DL (ref 65–105)
GLUCOSE BLD-MCNC: 132 MG/DL (ref 65–105)
GLUCOSE BLD-MCNC: 153 MG/DL (ref 65–105)
GLUCOSE BLD-MCNC: 159 MG/DL (ref 65–105)
GLUCOSE BLD-MCNC: 176 MG/DL (ref 65–105)
GLUCOSE BLD-MCNC: 201 MG/DL (ref 65–105)
GLUCOSE BLD-MCNC: 290 MG/DL (ref 65–105)
GLUCOSE BLD-MCNC: 359 MG/DL (ref 65–105)
GLUCOSE BLD-MCNC: 99 MG/DL (ref 65–105)
GLUCOSE SERPL-MCNC: 109 MG/DL (ref 74–99)
GLUCOSE SERPL-MCNC: 97 MG/DL (ref 74–99)
GLUCOSE UR STRIP-MCNC: ABNORMAL MG/DL
HBA1C MFR BLD: 10.3 % (ref 4–6)
HCO3 VENOUS: 12.8 MMOL/L (ref 24–30)
HGB UR QL STRIP.AUTO: NEGATIVE
KETONES UR STRIP-MCNC: NEGATIVE MG/DL
LEUKOCYTE ESTERASE UR QL STRIP: NEGATIVE
MAGNESIUM SERPL-MCNC: 1.6 MG/DL (ref 1.6–2.6)
MAGNESIUM SERPL-MCNC: 1.9 MG/DL (ref 1.6–2.6)
MAGNESIUM SERPL-MCNC: 2 MG/DL (ref 1.6–2.6)
METHEMOGLOBIN: 0.2 % (ref 0–1.9)
NEGATIVE BASE EXCESS, VEN: 11 MMOL/L (ref 0–2)
NITRITE UR QL STRIP: NEGATIVE
O2 SAT, VEN: 85.8 % (ref 60–85)
PCO2 VENOUS: 23.3 MM HG (ref 39–55)
PH UR STRIP: 5.5 [PH] (ref 5–8)
PH VENOUS: 7.36 (ref 7.32–7.42)
PHOSPHATE SERPL-MCNC: 2.7 MG/DL (ref 2.5–4.5)
PHOSPHATE SERPL-MCNC: 2.8 MG/DL (ref 2.5–4.5)
PHOSPHATE SERPL-MCNC: 3.9 MG/DL (ref 2.5–4.5)
PO2 VENOUS: 61 MM HG (ref 30–50)
POTASSIUM SERPL-SCNC: 3.8 MMOL/L (ref 3.7–5.3)
POTASSIUM SERPL-SCNC: 4.1 MMOL/L (ref 3.7–5.3)
PROT UR STRIP-MCNC: NEGATIVE MG/DL
SODIUM SERPL-SCNC: 139 MMOL/L (ref 136–145)
SODIUM SERPL-SCNC: 140 MMOL/L (ref 136–145)
SP GR UR STRIP: 1.02 (ref 1–1.03)
UROBILINOGEN UR STRIP-ACNC: NORMAL EU/DL (ref 0–1)

## 2025-03-10 PROCEDURE — 81003 URINALYSIS AUTO W/O SCOPE: CPT

## 2025-03-10 PROCEDURE — 2580000003 HC RX 258

## 2025-03-10 PROCEDURE — 97530 THERAPEUTIC ACTIVITIES: CPT

## 2025-03-10 PROCEDURE — 82805 BLOOD GASES W/O2 SATURATION: CPT

## 2025-03-10 PROCEDURE — 6360000002 HC RX W HCPCS

## 2025-03-10 PROCEDURE — 99213 OFFICE O/P EST LOW 20 MIN: CPT

## 2025-03-10 PROCEDURE — 80048 BASIC METABOLIC PNL TOTAL CA: CPT

## 2025-03-10 PROCEDURE — 36415 COLL VENOUS BLD VENIPUNCTURE: CPT

## 2025-03-10 PROCEDURE — 6370000000 HC RX 637 (ALT 250 FOR IP): Performed by: STUDENT IN AN ORGANIZED HEALTH CARE EDUCATION/TRAINING PROGRAM

## 2025-03-10 PROCEDURE — 99222 1ST HOSP IP/OBS MODERATE 55: CPT | Performed by: STUDENT IN AN ORGANIZED HEALTH CARE EDUCATION/TRAINING PROGRAM

## 2025-03-10 PROCEDURE — 1200000000 HC SEMI PRIVATE

## 2025-03-10 PROCEDURE — 82947 ASSAY GLUCOSE BLOOD QUANT: CPT

## 2025-03-10 PROCEDURE — 70450 CT HEAD/BRAIN W/O DYE: CPT

## 2025-03-10 PROCEDURE — 6370000000 HC RX 637 (ALT 250 FOR IP): Performed by: INTERNAL MEDICINE

## 2025-03-10 PROCEDURE — 84100 ASSAY OF PHOSPHORUS: CPT

## 2025-03-10 PROCEDURE — 2500000003 HC RX 250 WO HCPCS

## 2025-03-10 PROCEDURE — 6360000002 HC RX W HCPCS: Performed by: STUDENT IN AN ORGANIZED HEALTH CARE EDUCATION/TRAINING PROGRAM

## 2025-03-10 PROCEDURE — 83735 ASSAY OF MAGNESIUM: CPT

## 2025-03-10 PROCEDURE — 99223 1ST HOSP IP/OBS HIGH 75: CPT | Performed by: INTERNAL MEDICINE

## 2025-03-10 PROCEDURE — 6370000000 HC RX 637 (ALT 250 FOR IP)

## 2025-03-10 PROCEDURE — 82800 BLOOD PH: CPT

## 2025-03-10 PROCEDURE — 97166 OT EVAL MOD COMPLEX 45 MIN: CPT

## 2025-03-10 PROCEDURE — 93010 ELECTROCARDIOGRAM REPORT: CPT | Performed by: INTERNAL MEDICINE

## 2025-03-10 PROCEDURE — 97535 SELF CARE MNGMENT TRAINING: CPT

## 2025-03-10 PROCEDURE — APPNB30 APP NON BILLABLE TIME 0-30 MINS: Performed by: NURSE PRACTITIONER

## 2025-03-10 RX ORDER — ENOXAPARIN SODIUM 100 MG/ML
40 INJECTION SUBCUTANEOUS DAILY
Status: DISCONTINUED | OUTPATIENT
Start: 2025-03-10 | End: 2025-03-10 | Stop reason: SDUPTHER

## 2025-03-10 RX ORDER — LORAZEPAM 2 MG/ML
1 INJECTION INTRAMUSCULAR EVERY 4 HOURS PRN
Status: DISCONTINUED | OUTPATIENT
Start: 2025-03-10 | End: 2025-03-15

## 2025-03-10 RX ORDER — GABAPENTIN 300 MG/1
300 CAPSULE ORAL 3 TIMES DAILY
Status: DISCONTINUED | OUTPATIENT
Start: 2025-03-10 | End: 2025-03-16 | Stop reason: HOSPADM

## 2025-03-10 RX ORDER — POLYETHYLENE GLYCOL 3350 17 G/17G
17 POWDER, FOR SOLUTION ORAL DAILY PRN
Status: DISCONTINUED | OUTPATIENT
Start: 2025-03-10 | End: 2025-03-16 | Stop reason: HOSPADM

## 2025-03-10 RX ORDER — ONDANSETRON 2 MG/ML
4 INJECTION INTRAMUSCULAR; INTRAVENOUS EVERY 6 HOURS PRN
Status: DISCONTINUED | OUTPATIENT
Start: 2025-03-10 | End: 2025-03-15

## 2025-03-10 RX ORDER — DICYCLOMINE HYDROCHLORIDE 10 MG/1
10 CAPSULE ORAL
Status: DISCONTINUED | OUTPATIENT
Start: 2025-03-10 | End: 2025-03-16 | Stop reason: HOSPADM

## 2025-03-10 RX ORDER — CHOLESTYRAMINE LIGHT 4 G/5.7G
1 POWDER, FOR SUSPENSION ORAL DAILY
Status: DISCONTINUED | OUTPATIENT
Start: 2025-03-10 | End: 2025-03-16 | Stop reason: HOSPADM

## 2025-03-10 RX ORDER — SUCRALFATE 1 G/1
1 TABLET ORAL 4 TIMES DAILY
Status: DISCONTINUED | OUTPATIENT
Start: 2025-03-10 | End: 2025-03-16 | Stop reason: HOSPADM

## 2025-03-10 RX ORDER — BUPROPION HYDROCHLORIDE 150 MG/1
150 TABLET ORAL EVERY MORNING
Status: DISCONTINUED | OUTPATIENT
Start: 2025-03-10 | End: 2025-03-16 | Stop reason: HOSPADM

## 2025-03-10 RX ORDER — INSULIN LISPRO 100 [IU]/ML
0-8 INJECTION, SOLUTION INTRAVENOUS; SUBCUTANEOUS
Status: DISCONTINUED | OUTPATIENT
Start: 2025-03-10 | End: 2025-03-16 | Stop reason: HOSPADM

## 2025-03-10 RX ORDER — ENOXAPARIN SODIUM 100 MG/ML
1 INJECTION SUBCUTANEOUS 2 TIMES DAILY
Status: DISCONTINUED | OUTPATIENT
Start: 2025-03-10 | End: 2025-03-10

## 2025-03-10 RX ORDER — DEXTROSE MONOHYDRATE AND SODIUM CHLORIDE 5; .45 G/100ML; G/100ML
INJECTION, SOLUTION INTRAVENOUS CONTINUOUS PRN
Status: DISCONTINUED | OUTPATIENT
Start: 2025-03-10 | End: 2025-03-10

## 2025-03-10 RX ORDER — MIDODRINE HYDROCHLORIDE 2.5 MG/1
2.5 TABLET ORAL 2 TIMES DAILY PRN
Status: DISCONTINUED | OUTPATIENT
Start: 2025-03-10 | End: 2025-03-16 | Stop reason: HOSPADM

## 2025-03-10 RX ORDER — FONDAPARINUX SODIUM 7.5 MG/.6ML
7.5 INJECTION SUBCUTANEOUS DAILY
Status: DISCONTINUED | OUTPATIENT
Start: 2025-03-10 | End: 2025-03-16 | Stop reason: HOSPADM

## 2025-03-10 RX ORDER — PANTOPRAZOLE SODIUM 40 MG/1
40 TABLET, DELAYED RELEASE ORAL
Status: DISCONTINUED | OUTPATIENT
Start: 2025-03-10 | End: 2025-03-16 | Stop reason: HOSPADM

## 2025-03-10 RX ORDER — MAGNESIUM SULFATE 1 G/100ML
1000 INJECTION INTRAVENOUS PRN
Status: DISCONTINUED | OUTPATIENT
Start: 2025-03-10 | End: 2025-03-10 | Stop reason: SDUPTHER

## 2025-03-10 RX ORDER — INSULIN GLARGINE 100 [IU]/ML
10 INJECTION, SOLUTION SUBCUTANEOUS NIGHTLY
Status: DISCONTINUED | OUTPATIENT
Start: 2025-03-10 | End: 2025-03-10

## 2025-03-10 RX ORDER — SODIUM CHLORIDE 450 MG/100ML
INJECTION, SOLUTION INTRAVENOUS CONTINUOUS
Status: DISCONTINUED | OUTPATIENT
Start: 2025-03-10 | End: 2025-03-10 | Stop reason: SDUPTHER

## 2025-03-10 RX ORDER — INSULIN GLARGINE 100 [IU]/ML
10 INJECTION, SOLUTION SUBCUTANEOUS DAILY
Status: DISCONTINUED | OUTPATIENT
Start: 2025-03-10 | End: 2025-03-12

## 2025-03-10 RX ORDER — LORAZEPAM 0.5 MG/1
0.5 TABLET ORAL EVERY 8 HOURS PRN
Status: DISCONTINUED | OUTPATIENT
Start: 2025-03-10 | End: 2025-03-15

## 2025-03-10 RX ORDER — 0.9 % SODIUM CHLORIDE 0.9 %
15 INTRAVENOUS SOLUTION INTRAVENOUS ONCE
Status: DISCONTINUED | OUTPATIENT
Start: 2025-03-10 | End: 2025-03-10 | Stop reason: SDUPTHER

## 2025-03-10 RX ORDER — DILTIAZEM HYDROCHLORIDE 120 MG/1
120 CAPSULE, COATED, EXTENDED RELEASE ORAL DAILY
Status: DISCONTINUED | OUTPATIENT
Start: 2025-03-10 | End: 2025-03-11

## 2025-03-10 RX ORDER — BUMETANIDE 1 MG/1
2 TABLET ORAL DAILY PRN
Status: DISCONTINUED | OUTPATIENT
Start: 2025-03-10 | End: 2025-03-16 | Stop reason: HOSPADM

## 2025-03-10 RX ORDER — PRAVASTATIN SODIUM 40 MG
40 TABLET ORAL NIGHTLY
Status: DISCONTINUED | OUTPATIENT
Start: 2025-03-10 | End: 2025-03-16 | Stop reason: HOSPADM

## 2025-03-10 RX ORDER — POTASSIUM CHLORIDE 7.45 MG/ML
10 INJECTION INTRAVENOUS PRN
Status: DISCONTINUED | OUTPATIENT
Start: 2025-03-10 | End: 2025-03-10 | Stop reason: SDUPTHER

## 2025-03-10 RX ADMIN — MAGNESIUM SULFATE HEPTAHYDRATE 2000 MG: 40 INJECTION, SOLUTION INTRAVENOUS at 08:00

## 2025-03-10 RX ADMIN — SUCRALFATE 1 G: 1 TABLET ORAL at 13:03

## 2025-03-10 RX ADMIN — PANTOPRAZOLE SODIUM 40 MG: 40 TABLET, DELAYED RELEASE ORAL at 16:09

## 2025-03-10 RX ADMIN — PRAVASTATIN SODIUM 40 MG: 40 TABLET ORAL at 21:47

## 2025-03-10 RX ADMIN — SODIUM CHLORIDE 10.8 UNITS/HR: 9 INJECTION, SOLUTION INTRAVENOUS at 00:33

## 2025-03-10 RX ADMIN — POTASSIUM CHLORIDE 10 MEQ: 7.46 INJECTION, SOLUTION INTRAVENOUS at 10:35

## 2025-03-10 RX ADMIN — POTASSIUM BICARBONATE 40 MEQ: 782 TABLET, EFFERVESCENT ORAL at 00:56

## 2025-03-10 RX ADMIN — POTASSIUM CHLORIDE 10 MEQ: 7.46 INJECTION, SOLUTION INTRAVENOUS at 08:02

## 2025-03-10 RX ADMIN — INSULIN LISPRO 4 UNITS: 100 INJECTION, SOLUTION INTRAVENOUS; SUBCUTANEOUS at 21:57

## 2025-03-10 RX ADMIN — HYDROMORPHONE HYDROCHLORIDE 0.5 MG: 1 INJECTION, SOLUTION INTRAMUSCULAR; INTRAVENOUS; SUBCUTANEOUS at 04:02

## 2025-03-10 RX ADMIN — HYDROMORPHONE HYDROCHLORIDE 0.5 MG: 1 INJECTION, SOLUTION INTRAMUSCULAR; INTRAVENOUS; SUBCUTANEOUS at 00:56

## 2025-03-10 RX ADMIN — SODIUM CHLORIDE: 4.5 INJECTION, SOLUTION INTRAVENOUS at 00:44

## 2025-03-10 RX ADMIN — LORAZEPAM 1 MG: 2 INJECTION INTRAMUSCULAR; INTRAVENOUS at 13:03

## 2025-03-10 RX ADMIN — DEXTROSE AND SODIUM CHLORIDE: 5; 450 INJECTION, SOLUTION INTRAVENOUS at 09:39

## 2025-03-10 RX ADMIN — SUCRALFATE 1 G: 1 TABLET ORAL at 16:09

## 2025-03-10 RX ADMIN — GABAPENTIN 300 MG: 300 CAPSULE ORAL at 13:03

## 2025-03-10 RX ADMIN — GABAPENTIN 300 MG: 300 CAPSULE ORAL at 21:48

## 2025-03-10 RX ADMIN — SUCRALFATE 1 G: 1 TABLET ORAL at 21:48

## 2025-03-10 RX ADMIN — LORAZEPAM 1 MG: 2 INJECTION INTRAMUSCULAR; INTRAVENOUS at 21:47

## 2025-03-10 RX ADMIN — HYDROMORPHONE HYDROCHLORIDE 1 MG: 1 INJECTION, SOLUTION INTRAMUSCULAR; INTRAVENOUS; SUBCUTANEOUS at 18:34

## 2025-03-10 RX ADMIN — INSULIN GLARGINE 10 UNITS: 100 INJECTION, SOLUTION SUBCUTANEOUS at 13:03

## 2025-03-10 RX ADMIN — HYDROMORPHONE HYDROCHLORIDE 0.5 MG: 1 INJECTION, SOLUTION INTRAMUSCULAR; INTRAVENOUS; SUBCUTANEOUS at 16:09

## 2025-03-10 RX ADMIN — HYDROMORPHONE HYDROCHLORIDE 1 MG: 1 INJECTION, SOLUTION INTRAMUSCULAR; INTRAVENOUS; SUBCUTANEOUS at 21:47

## 2025-03-10 RX ADMIN — ENOXAPARIN SODIUM 90 MG: 100 INJECTION SUBCUTANEOUS at 07:48

## 2025-03-10 RX ADMIN — DEXTROSE AND SODIUM CHLORIDE: 5; 450 INJECTION, SOLUTION INTRAVENOUS at 02:55

## 2025-03-10 RX ADMIN — HYDROMORPHONE HYDROCHLORIDE 0.5 MG: 1 INJECTION, SOLUTION INTRAMUSCULAR; INTRAVENOUS; SUBCUTANEOUS at 07:01

## 2025-03-10 RX ADMIN — LORAZEPAM 1 MG: 2 INJECTION INTRAMUSCULAR; INTRAVENOUS at 17:07

## 2025-03-10 RX ADMIN — POTASSIUM CHLORIDE 10 MEQ: 7.46 INJECTION, SOLUTION INTRAVENOUS at 02:27

## 2025-03-10 RX ADMIN — POTASSIUM CHLORIDE 10 MEQ: 7.46 INJECTION, SOLUTION INTRAVENOUS at 04:46

## 2025-03-10 RX ADMIN — POTASSIUM CHLORIDE 10 MEQ: 7.46 INJECTION, SOLUTION INTRAVENOUS at 09:19

## 2025-03-10 RX ADMIN — POTASSIUM CHLORIDE 10 MEQ: 7.46 INJECTION, SOLUTION INTRAVENOUS at 00:36

## 2025-03-10 RX ADMIN — LORAZEPAM 1 MG: 2 INJECTION INTRAMUSCULAR; INTRAVENOUS at 04:43

## 2025-03-10 RX ADMIN — HYDROMORPHONE HYDROCHLORIDE 0.5 MG: 1 INJECTION, SOLUTION INTRAMUSCULAR; INTRAVENOUS; SUBCUTANEOUS at 13:03

## 2025-03-10 RX ADMIN — HYDROMORPHONE HYDROCHLORIDE 0.5 MG: 1 INJECTION, SOLUTION INTRAMUSCULAR; INTRAVENOUS; SUBCUTANEOUS at 10:08

## 2025-03-10 RX ADMIN — LORAZEPAM 1 MG: 2 INJECTION INTRAMUSCULAR; INTRAVENOUS at 08:44

## 2025-03-10 RX ADMIN — ONDANSETRON 4 MG: 2 INJECTION, SOLUTION INTRAMUSCULAR; INTRAVENOUS at 06:13

## 2025-03-10 ASSESSMENT — PAIN SCALES - GENERAL
PAINLEVEL_OUTOF10: 7
PAINLEVEL_OUTOF10: 8
PAINLEVEL_OUTOF10: 5
PAINLEVEL_OUTOF10: 8
PAINLEVEL_OUTOF10: 8
PAINLEVEL_OUTOF10: 7
PAINLEVEL_OUTOF10: 8

## 2025-03-10 ASSESSMENT — PAIN DESCRIPTION - ORIENTATION
ORIENTATION: LEFT
ORIENTATION: LEFT

## 2025-03-10 ASSESSMENT — PAIN DESCRIPTION - LOCATION
LOCATION: HIP;LEG
LOCATION: HIP;LEG

## 2025-03-10 ASSESSMENT — PAIN SCALES - WONG BAKER: WONGBAKER_NUMERICALRESPONSE: NO HURT

## 2025-03-10 NOTE — H&P
Fidel Torre MD   ondansetron (ZOFRAN-ODT) 8 MG TBDP disintegrating tablet Take 1 tablet by mouth 12/9/24  Yes Arsen Owens MD   sucralfate (CARAFATE) 1 GM tablet Take 1 tablet by mouth 4 times daily 1/7/25  Yes Crystal Estrada MD   omeprazole (PRILOSEC) 20 MG delayed release capsule TAKE 1 CAPSULE BY MOUTH TWICE DAILY 12/30/24  Yes Brianne Lemus PA-C   fondaparinux (ARIXTRA) 7.5 MG/0.6ML SOLN injection Inject 0.6 mLs into the skin daily 12/19/24  Yes Wilner Ash MD   tiZANidine (ZANAFLEX) 4 MG tablet Take 1 tablet by mouth 3 times daily as needed (Back pain) 10/4/24  Yes Alexis Barnes MD   dilTIAZem (CARDIZEM CD) 120 MG extended release capsule Take 1 capsule by mouth daily   Yes Arsen Owens MD   gabapentin (NEURONTIN) 300 MG capsule Take 1 capsule by mouth 3 times daily.   Yes Arsen Owens MD   INSULIN LISPRO IJ Inject as directed Takes 6 units with meals   Yes Arsen Owens MD   LORazepam (ATIVAN) 1 MG tablet Take 0.5 tablets by mouth every 8 hours as needed for Anxiety.   Yes Arsen Owens MD   cholecalciferol (VITAMIN D3) 400 UNIT TABS tablet Take 5 tablets by mouth daily   Yes Arsen Owens MD   dicyclomine (BENTYL) 10 MG capsule Take 1 capsule by mouth 4 times daily (before meals and nightly)   Yes Arsen Owens MD   buPROPion (WELLBUTRIN XL) 150 MG extended release tablet Take 1 tablet by mouth every morning 12/9/23  Yes Shade Griffith MD   FLUoxetine (PROZAC) 40 MG capsule Take 1 capsule by mouth daily 8/26/23  Yes June Black MD   Syringe/Needle, Disp, (SYRINGE 3CC/25GX1\") 25G X 1\" 3 ML MISC To be used with B12 injections monthly 2/13/21  Yes Sandy Rogel MD   pravastatin (PRAVACHOL) 40 MG tablet TAKE 1 TABLET(40 MG) BY MOUTH DAILY  Patient taking differently: Take 1 tablet by mouth at bedtime 6/26/20  Yes Sandy Rogel MD   glucose monitoring kit (FREESTYLE) monitoring kit Testing twice a day.  Please

## 2025-03-10 NOTE — ED TRIAGE NOTES
Mode of arrival (squad #, walk in, police, etc) : EMS        Chief complaint(s): fall         Arrival Note (brief scenario, treatment PTA, etc).: Fall at home while using  rest room . Was feeling dizzy and fell on left side . Patient use walker at home for ambulation al report  nausea , vomiting . Alert and oriented X4.         C= \"Have you ever felt that you should Cut down on your drinking?\"  No  A= \"Have people Annoyed you by criticizing your drinking?\"  No  G= \"Have you ever felt bad or Guilty about your drinking?\"  No  E= \"Have you ever had a drink as an Eye-opener first thing in the morning to steady your nerves or to help a hangover?\"  No      Deferred []      Reason for deferring: N/A    *If yes to two or more: probable alcohol abuse.*

## 2025-03-10 NOTE — ED NOTES
Report given to PEBBLES Toussaint from ICU.   Report method in person   The following was reviewed with receiving RN:   Current vital signs:  /65   Pulse 67   Temp 98 °F (36.7 °C) (Oral)   Resp 18   Ht 1.727 m (5' 8\")   Wt 93 kg (205 lb)   SpO2 100%   BMI 31.17 kg/m²                MEWS Score: 1     Any medication or safety alerts were reviewed. Any pending diagnostics and notifications were also reviewed, as well as any safety concerns or issues, abnormal labs, abnormal imaging, and abnormal assessment findings. Questions were answered.

## 2025-03-10 NOTE — DISCHARGE INSTR - COC
Antibacterial ointment;Dry dressing 03/10/25 1026   Dressing Change Due 02/18/25 02/18/25 1520   Wound Length (cm) 3.5 cm 03/10/25 1026   Wound Width (cm) 7.5 cm 03/10/25 1026   Wound Depth (cm) 0.2 cm 03/10/25 1026   Wound Surface Area (cm^2) 26.25 cm^2 03/10/25 1026   Change in Wound Size % (l*w) -0.57 03/10/25 1026   Wound Volume (cm^3) 5.25 cm^3 03/10/25 1026   Wound Healing % -1 03/10/25 1026   Wound Assessment Pink/red;Slough 03/10/25 1026   Drainage Amount Moderate (25-50%) 03/10/25 1026   Drainage Description Serosanguinous 03/10/25 1026   Odor None 03/10/25 1026   Valerie-wound Assessment Blanchable erythema;Induration 03/10/25 1026   Margins Defined edges 03/10/25 1026   Number of days: 22     Abdomen wound care:  cleanse with saline, pat dry. Use Clobetasol to wound perimeter- about 1 inch around the wound (do not apply to the wound itself). Cover wound with collagen and secondary dressing as appropriate. Change daily and as needed if loose or soiled.      Right medial thigh wound care: Cleanse with saline, pat dry. Use Clobetasol to wound perimeter- about 1 inch around the wound (do not apply to the wound itself). Apply thick layer of Santyl (ok to use Silvadene if Santyl is not affordable) to wounds, cover with nonstick telfa pad, secure with bordered gauze. Change daily and as needed if loose or soiled.     Groin: Cleanse with soap and water, pat dry. Apply miconazole powder twice daily as ordered.        Safety Concerns:     History of Falls (last 30 days) and At Risk for Falls    Impairments/Disabilities:      None    Nutrition Therapy:  Current Nutrition Therapy:   - Oral Diet:  Carb Control 5 carbs/meal (2000kcals/day)    Routes of Feeding: Oral  Liquids: No Restrictions  Daily Fluid Restriction: no  Last Modified Barium Swallow with Video (Video Swallowing Test): not done    Treatments at the Time of Hospital Discharge:   Respiratory Treatments: n/a  Oxygen Therapy:  is not on home oxygen

## 2025-03-10 NOTE — ACP (ADVANCE CARE PLANNING)
Directives and portable DNR orders.    Length of ACP Conversation in minutes:      Conversation Outcomes:  ACP discussion completed    Follow-up plan:    [] Schedule follow-up conversation to continue planning  [x] Referred individual to Provider for additional questions/concerns   [] Advised patient/agent/surrogate to review completed ACP document and update if needed with changes in condition, patient preferences or care setting    [] This note routed to one or more involved healthcare providers

## 2025-03-10 NOTE — CARE COORDINATION
Case Management Assessment  Initial Evaluation    Date/Time of Evaluation: 3/10/2025 11:04AM  Assessment Completed by: Helene Crain RN    If patient is discharged prior to next notation, then this note serves as note for discharge by case management.    Patient Name: Krista Chiang                   YOB: 1980  Diagnosis: Dizziness [R42]  DKA, type 1, not at goal (HCC) [E10.10]  Diabetic ketoacidosis without coma associated with type 2 diabetes mellitus (HCC) [E11.10]                   Date / Time: 3/9/2025 10:39 PM    Patient Admission Status: Inpatient   Readmission Risk (Low < 19, Mod (19-27), High > 27): Readmission Risk Score: 30.6    Current PCP: Arielle Melton APRN - NP  PCP verified by CM? Yes    Chart Reviewed: Yes      History Provided by: Patient  Patient Orientation: Alert and Oriented    Patient Cognition: Alert    Hospitalization in the last 30 days (Readmission):  Yes    If yes, Readmission Assessment in  Navigator will be completed.    Advance Directives:      Code Status: Full Code   Patient's Primary Decision Maker is: Legal Next of Kin    Primary Decision Maker: Naima Grant - Parent - [phone]    Secondary Decision Maker: JuanitaZachery - Brother/Sister - [phone]    Discharge Planning:    Patient lives with: Alone Type of Home: Trailer/Mobile Home  Primary Care Giver: Self  Patient Support Systems include: Parent, Family Members, Children   Current Financial resources: Medicare, Medicaid  Current community resources: ECF/Home Care  Current services prior to admission: Durable Medical Equipment, Home Care            Current DME: Walker, Wheelchair, Shower Chair, Bedside Commode, Glucometer            Type of Home Care services:  None    ADLS  Prior functional level: Assistance with the following:, Bathing, Dressing, Toileting, Housework, Cooking, Shopping, Mobility  Current functional level: Assistance with the following:, Bathing, Dressing, Toileting, Cooking,

## 2025-03-10 NOTE — CARE COORDINATION
DISCHARGE PLANNING NOTE:    Went to call wound care clinic about patients follow ups due to patient going to SNF. Will patient need to follow up in clinic or will wound care at SNF be okay? Wound Care Clinic no longer open. Call will need to be placed in the morning.    Electronically signed by Helene Crain RN on 3/10/2025 at 4:35 PM

## 2025-03-10 NOTE — ED PROVIDER NOTES
[facility] ICU  Emergency Department Encounter  Emergency Medicine Resident     Pt Name:Krista Chiang  MRN: [id number]  Birthdate 1980  Date of evaluation: 3/9/25  PCP:  Arielle Melton APRN - NP  Note Started: 11:06 PM EDT      CHIEF COMPLAINT       Chief Complaint   Patient presents with    Fall    Dizziness     Fall at home while using  rest room . Was feeling dizzy and fell on left side . Patient use walker at home for ambulation     Nausea    Leg Pain       HISTORY OF PRESENT ILLNESS  (Location/Symptom, Timing/Onset, Context/Setting, Quality, Duration, Modifying Factors, Severity.)      Krista Chiang is a 44 y.o. female recently treated for ESBL UTI with meropenem x 7 days presents with dizziness and fall.  Patient states she utilizes a wheeled walker for ambulation.  After using the bathroom she had a fall, landing on her left arm.  She denies any head, neck or back pain.  She denies head strike or loss of consciousness.  She is unsure if the fall was mechanical or syncopal in nature.  She states she is feeling more dizzy than usual.  There are no focal neurological deficits.  She denies fever, chills, myalgias night sweats.  She was able to ambulate after incident.  She has no associated nausea no episodes of vomiting.     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 
      MDM/Plan:   See ED course.  Unknown where DKA was triggered from.  Patient was not able to give us a urine sample.  Patient started on IV fluids, insulin infusion.  Admitted to the ICU.  Chest and hip x-ray completed.  No significant findings or changes noted.    EKG Interpretation    Interpreted by me  [id number]  Sinus bradycardia rate of 58.  No ST segment changes, no ectopy.  Normal axis, good R wave progression.    Procedures:  Ultrasound-Guided Peripheral IV:    PROCEDURE PERFORMED:  Ultrasound guided peripheral IV insertion  PERFORMING PHYSICIAN: Gautam Lundy DO  ESTIMATED BLOOD LOSS:  Less than 5 ml  COMPLICATIONS:  None immediately appreciated.     DISCUSSION:    Krista Chiang is a 44 y.o.-year-old female who requires peripheral IV access vascular access. The history and physical examination were reviewed and confirmed.      CONSENT: The patient provided verbal consent for this procedure.     PROCEDURE:  A timeout was initiated by the bedside nurse and was confirmed by those present. The skin overlying the left forearm was prepped with chlorhexadine. The vessel and surrounding anatomy was visualized using ultrasound. An 18g IV was inserted into the vein under ultrasound guidance. Line was flushed easily. A temporary sterile dressing was applied.  No immediate complication was evident.     Gautam Lundy DO         Ultrasound-Guided Peripheral IV:    PROCEDURE PERFORMED:  Ultrasound guided peripheral IV insertion  PERFORMING PHYSICIAN: Gautam Lundy DO  ESTIMATED BLOOD LOSS:  Less than 5 ml  COMPLICATIONS:  None immediately appreciated.     DISCUSSION:    Krista Chiang is a 44 y.o.-year-old female who requires peripheral IV access vascular access. The history and physical examination were reviewed and confirmed.      CONSENT: The patient provided verbal consent for this procedure.     PROCEDURE:  A timeout was initiated by the bedside nurse and was confirmed by those present.

## 2025-03-11 PROBLEM — I47.20 WIDE QRS VENTRICULAR TACHYCARDIA (HCC): Status: ACTIVE | Noted: 2025-03-11

## 2025-03-11 LAB
ALBUMIN SERPL-MCNC: 2.2 G/DL (ref 3.5–5.2)
ALP SERPL-CCNC: 174 U/L (ref 35–104)
ALT SERPL-CCNC: 39 U/L (ref 10–35)
ANION GAP SERPL CALCULATED.3IONS-SCNC: 9 MMOL/L (ref 9–16)
AST SERPL-CCNC: 42 U/L (ref 10–35)
BILIRUB DIRECT SERPL-MCNC: <0.1 MG/DL (ref 0–0.3)
BILIRUB INDIRECT SERPL-MCNC: ABNORMAL MG/DL (ref 0–1)
BILIRUB SERPL-MCNC: <0.2 MG/DL (ref 0–1.2)
BUN SERPL-MCNC: 6 MG/DL (ref 6–20)
C DIFF GDH + TOXINS A+B STL QL IA.RAPID: NEGATIVE
CALCIUM SERPL-MCNC: 7.7 MG/DL (ref 8.6–10.4)
CHLORIDE SERPL-SCNC: 111 MMOL/L (ref 98–107)
CO2 SERPL-SCNC: 17 MMOL/L (ref 20–31)
CREAT SERPL-MCNC: 0.6 MG/DL (ref 0.7–1.2)
EKG Q-T INTERVAL: 264 MS
EKG QRS DURATION: 174 MS
EKG QTC CALCULATION (BAZETT): 515 MS
EKG R AXIS: 8 DEGREES
EKG T AXIS: -135 DEGREES
EKG VENTRICULAR RATE: 229 BPM
EST. AVERAGE GLUCOSE BLD GHB EST-MCNC: 252 MG/DL
GFR, ESTIMATED: >90 ML/MIN/1.73M2
GLUCOSE BLD-MCNC: 127 MG/DL (ref 65–105)
GLUCOSE BLD-MCNC: 242 MG/DL (ref 65–105)
GLUCOSE BLD-MCNC: 80 MG/DL (ref 65–105)
GLUCOSE SERPL-MCNC: 140 MG/DL (ref 74–99)
HBA1C MFR BLD: 10.4 % (ref 4–6)
POTASSIUM SERPL-SCNC: 4.3 MMOL/L (ref 3.7–5.3)
PROT SERPL-MCNC: 5.2 G/DL (ref 6.6–8.7)
SODIUM SERPL-SCNC: 137 MMOL/L (ref 136–145)
SPECIMEN DESCRIPTION: NORMAL

## 2025-03-11 PROCEDURE — 80048 BASIC METABOLIC PNL TOTAL CA: CPT

## 2025-03-11 PROCEDURE — 2580000003 HC RX 258

## 2025-03-11 PROCEDURE — 6360000002 HC RX W HCPCS: Performed by: INTERNAL MEDICINE

## 2025-03-11 PROCEDURE — 80076 HEPATIC FUNCTION PANEL: CPT

## 2025-03-11 PROCEDURE — 6370000000 HC RX 637 (ALT 250 FOR IP)

## 2025-03-11 PROCEDURE — 2500000003 HC RX 250 WO HCPCS: Performed by: INTERNAL MEDICINE

## 2025-03-11 PROCEDURE — 6370000000 HC RX 637 (ALT 250 FOR IP): Performed by: INTERNAL MEDICINE

## 2025-03-11 PROCEDURE — 36415 COLL VENOUS BLD VENIPUNCTURE: CPT

## 2025-03-11 PROCEDURE — 87324 CLOSTRIDIUM AG IA: CPT

## 2025-03-11 PROCEDURE — 87506 IADNA-DNA/RNA PROBE TQ 6-11: CPT

## 2025-03-11 PROCEDURE — APPSS30 APP SPLIT SHARED TIME 16-30 MINUTES: Performed by: NURSE PRACTITIONER

## 2025-03-11 PROCEDURE — 93010 ELECTROCARDIOGRAM REPORT: CPT | Performed by: INTERNAL MEDICINE

## 2025-03-11 PROCEDURE — 6370000000 HC RX 637 (ALT 250 FOR IP): Performed by: SPECIALIST

## 2025-03-11 PROCEDURE — 2500000003 HC RX 250 WO HCPCS

## 2025-03-11 PROCEDURE — 3E033XZ INTRODUCTION OF VASOPRESSOR INTO PERIPHERAL VEIN, PERCUTANEOUS APPROACH: ICD-10-PCS

## 2025-03-11 PROCEDURE — 99223 1ST HOSP IP/OBS HIGH 75: CPT | Performed by: SPECIALIST

## 2025-03-11 PROCEDURE — 83036 HEMOGLOBIN GLYCOSYLATED A1C: CPT

## 2025-03-11 PROCEDURE — 82947 ASSAY GLUCOSE BLOOD QUANT: CPT

## 2025-03-11 PROCEDURE — 99233 SBSQ HOSP IP/OBS HIGH 50: CPT | Performed by: INTERNAL MEDICINE

## 2025-03-11 PROCEDURE — 87449 NOS EACH ORGANISM AG IA: CPT

## 2025-03-11 PROCEDURE — 82653 EL-1 FECAL QUANTITATIVE: CPT

## 2025-03-11 PROCEDURE — 93005 ELECTROCARDIOGRAM TRACING: CPT

## 2025-03-11 PROCEDURE — 6360000002 HC RX W HCPCS

## 2025-03-11 PROCEDURE — 99232 SBSQ HOSP IP/OBS MODERATE 35: CPT | Performed by: STUDENT IN AN ORGANIZED HEALTH CARE EDUCATION/TRAINING PROGRAM

## 2025-03-11 PROCEDURE — 2580000003 HC RX 258: Performed by: INTERNAL MEDICINE

## 2025-03-11 PROCEDURE — 2000000000 HC ICU R&B

## 2025-03-11 RX ORDER — FLECAINIDE ACETATE 100 MG/1
100 TABLET ORAL 2 TIMES DAILY
Status: DISCONTINUED | OUTPATIENT
Start: 2025-03-11 | End: 2025-03-16 | Stop reason: HOSPADM

## 2025-03-11 RX ORDER — 0.9 % SODIUM CHLORIDE 0.9 %
500 INTRAVENOUS SOLUTION INTRAVENOUS ONCE
Status: COMPLETED | OUTPATIENT
Start: 2025-03-11 | End: 2025-03-11

## 2025-03-11 RX ORDER — METOPROLOL TARTRATE 25 MG/1
25 TABLET, FILM COATED ORAL 2 TIMES DAILY
Status: DISCONTINUED | OUTPATIENT
Start: 2025-03-11 | End: 2025-03-11

## 2025-03-11 RX ORDER — MIDAZOLAM HYDROCHLORIDE 2 MG/2ML
4 INJECTION, SOLUTION INTRAMUSCULAR; INTRAVENOUS ONCE
Status: DISCONTINUED | OUTPATIENT
Start: 2025-03-11 | End: 2025-03-11

## 2025-03-11 RX ORDER — METOPROLOL TARTRATE 50 MG
50 TABLET ORAL 2 TIMES DAILY
Status: DISCONTINUED | OUTPATIENT
Start: 2025-03-11 | End: 2025-03-16 | Stop reason: HOSPADM

## 2025-03-11 RX ORDER — DILTIAZEM HYDROCHLORIDE 5 MG/ML
10 INJECTION INTRAVENOUS ONCE
Status: DISCONTINUED | OUTPATIENT
Start: 2025-03-11 | End: 2025-03-15

## 2025-03-11 RX ORDER — ADENOSINE 3 MG/ML
12 INJECTION, SOLUTION INTRAVENOUS ONCE
Status: DISCONTINUED | OUTPATIENT
Start: 2025-03-11 | End: 2025-03-15

## 2025-03-11 RX ORDER — ADENOSINE 3 MG/ML
6 INJECTION, SOLUTION INTRAVENOUS ONCE
Status: COMPLETED | OUTPATIENT
Start: 2025-03-11 | End: 2025-03-11

## 2025-03-11 RX ORDER — OXYCODONE AND ACETAMINOPHEN 5; 325 MG/1; MG/1
1 TABLET ORAL EVERY 4 HOURS PRN
Refills: 0 | Status: DISCONTINUED | OUTPATIENT
Start: 2025-03-11 | End: 2025-03-12

## 2025-03-11 RX ORDER — OXYCODONE AND ACETAMINOPHEN 5; 325 MG/1; MG/1
1 TABLET ORAL EVERY 6 HOURS PRN
Refills: 0 | Status: DISCONTINUED | OUTPATIENT
Start: 2025-03-11 | End: 2025-03-12

## 2025-03-11 RX ORDER — MIDODRINE HYDROCHLORIDE 10 MG/1
10 TABLET ORAL ONCE
Status: COMPLETED | OUTPATIENT
Start: 2025-03-11 | End: 2025-03-11

## 2025-03-11 RX ORDER — METOPROLOL TARTRATE 25 MG/1
12.5 TABLET, FILM COATED ORAL 2 TIMES DAILY
Status: DISCONTINUED | OUTPATIENT
Start: 2025-03-11 | End: 2025-03-11

## 2025-03-11 RX ORDER — OXYCODONE HYDROCHLORIDE 5 MG/1
2.5 TABLET ORAL EVERY 6 HOURS PRN
Refills: 0 | Status: DISCONTINUED | OUTPATIENT
Start: 2025-03-11 | End: 2025-03-12

## 2025-03-11 RX ORDER — 0.9 % SODIUM CHLORIDE 0.9 %
1000 INTRAVENOUS SOLUTION INTRAVENOUS ONCE
Status: COMPLETED | OUTPATIENT
Start: 2025-03-11 | End: 2025-03-11

## 2025-03-11 RX ORDER — DIGOXIN 0.25 MG/ML
250 INJECTION INTRAMUSCULAR; INTRAVENOUS ONCE
Status: COMPLETED | OUTPATIENT
Start: 2025-03-11 | End: 2025-03-11

## 2025-03-11 RX ORDER — FENTANYL CITRATE 0.05 MG/ML
50 INJECTION, SOLUTION INTRAMUSCULAR; INTRAVENOUS ONCE
Status: DISCONTINUED | OUTPATIENT
Start: 2025-03-11 | End: 2025-03-11

## 2025-03-11 RX ORDER — MIDAZOLAM HYDROCHLORIDE 2 MG/2ML
2 INJECTION, SOLUTION INTRAMUSCULAR; INTRAVENOUS ONCE
Status: COMPLETED | OUTPATIENT
Start: 2025-03-11 | End: 2025-03-11

## 2025-03-11 RX ORDER — METOPROLOL TARTRATE 1 MG/ML
5 INJECTION, SOLUTION INTRAVENOUS
Status: ACTIVE | OUTPATIENT
Start: 2025-03-11 | End: 2025-03-12

## 2025-03-11 RX ADMIN — DIGOXIN 250 MCG: 0.25 INJECTION INTRAMUSCULAR; INTRAVENOUS at 04:59

## 2025-03-11 RX ADMIN — METOPROLOL TARTRATE 50 MG: 50 TABLET, FILM COATED ORAL at 20:16

## 2025-03-11 RX ADMIN — OXYCODONE HYDROCHLORIDE AND ACETAMINOPHEN 1 TABLET: 5; 325 TABLET ORAL at 19:25

## 2025-03-11 RX ADMIN — GABAPENTIN 300 MG: 300 CAPSULE ORAL at 15:13

## 2025-03-11 RX ADMIN — FLECAINIDE ACETATE 100 MG: 100 TABLET ORAL at 15:14

## 2025-03-11 RX ADMIN — SUCRALFATE 1 G: 1 TABLET ORAL at 17:26

## 2025-03-11 RX ADMIN — HYDROMORPHONE HYDROCHLORIDE 1 MG: 1 INJECTION, SOLUTION INTRAMUSCULAR; INTRAVENOUS; SUBCUTANEOUS at 00:55

## 2025-03-11 RX ADMIN — HYDROMORPHONE HYDROCHLORIDE 1 MG: 1 INJECTION, SOLUTION INTRAMUSCULAR; INTRAVENOUS; SUBCUTANEOUS at 05:33

## 2025-03-11 RX ADMIN — PANTOPRAZOLE SODIUM 40 MG: 40 TABLET, DELAYED RELEASE ORAL at 15:13

## 2025-03-11 RX ADMIN — MICONAZOLE NITRATE: 20 POWDER TOPICAL at 20:16

## 2025-03-11 RX ADMIN — MICONAZOLE NITRATE: 20 POWDER TOPICAL at 09:07

## 2025-03-11 RX ADMIN — GABAPENTIN 300 MG: 300 CAPSULE ORAL at 09:05

## 2025-03-11 RX ADMIN — CHOLESTYRAMINE 4 G: 4 POWDER, FOR SUSPENSION ORAL at 09:07

## 2025-03-11 RX ADMIN — MIDAZOLAM 2 MG: 1 INJECTION INTRAMUSCULAR; INTRAVENOUS at 05:47

## 2025-03-11 RX ADMIN — ADENOSINE 6 MG: 3 INJECTION INTRAVENOUS at 05:47

## 2025-03-11 RX ADMIN — COLLAGENASE SANTYL: 250 OINTMENT TOPICAL at 15:17

## 2025-03-11 RX ADMIN — SUCRALFATE 1 G: 1 TABLET ORAL at 09:06

## 2025-03-11 RX ADMIN — PANTOPRAZOLE SODIUM 40 MG: 40 TABLET, DELAYED RELEASE ORAL at 09:16

## 2025-03-11 RX ADMIN — SODIUM CHLORIDE 1000 ML: 0.9 INJECTION, SOLUTION INTRAVENOUS at 03:44

## 2025-03-11 RX ADMIN — AMIODARONE HYDROCHLORIDE 150 MG: 1.5 INJECTION, SOLUTION INTRAVENOUS at 03:53

## 2025-03-11 RX ADMIN — SUCRALFATE 1 G: 1 TABLET ORAL at 20:15

## 2025-03-11 RX ADMIN — FONDAPARINUX SODIUM 7.5 MG: 7.5 INJECTION, SOLUTION SUBCUTANEOUS at 09:06

## 2025-03-11 RX ADMIN — PHENYLEPHRINE HYDROCHLORIDE 30 MCG/MIN: 50 INJECTION INTRAVENOUS at 04:50

## 2025-03-11 RX ADMIN — DIGOXIN 250 MCG: 0.25 INJECTION INTRAMUSCULAR; INTRAVENOUS at 04:33

## 2025-03-11 RX ADMIN — OXYCODONE HYDROCHLORIDE AND ACETAMINOPHEN 1 TABLET: 5; 325 TABLET ORAL at 11:10

## 2025-03-11 RX ADMIN — INSULIN LISPRO 2 UNITS: 100 INJECTION, SOLUTION INTRAVENOUS; SUBCUTANEOUS at 20:10

## 2025-03-11 RX ADMIN — LORAZEPAM 1 MG: 2 INJECTION INTRAMUSCULAR; INTRAVENOUS at 01:46

## 2025-03-11 RX ADMIN — LORAZEPAM 1 MG: 2 INJECTION INTRAMUSCULAR; INTRAVENOUS at 07:04

## 2025-03-11 RX ADMIN — HYDROMORPHONE HYDROCHLORIDE 1 MG: 1 INJECTION, SOLUTION INTRAMUSCULAR; INTRAVENOUS; SUBCUTANEOUS at 08:21

## 2025-03-11 RX ADMIN — FLECAINIDE ACETATE 100 MG: 100 TABLET ORAL at 20:16

## 2025-03-11 RX ADMIN — GABAPENTIN 300 MG: 300 CAPSULE ORAL at 20:16

## 2025-03-11 RX ADMIN — PRAVASTATIN SODIUM 40 MG: 40 TABLET ORAL at 20:15

## 2025-03-11 RX ADMIN — AMIODARONE HYDROCHLORIDE 1 MG/MIN: 1.8 INJECTION, SOLUTION INTRAVENOUS at 04:28

## 2025-03-11 RX ADMIN — SUCRALFATE 1 G: 1 TABLET ORAL at 15:13

## 2025-03-11 RX ADMIN — AMIODARONE HYDROCHLORIDE 0.5 MG/MIN: 1.8 INJECTION, SOLUTION INTRAVENOUS at 09:45

## 2025-03-11 RX ADMIN — SODIUM CHLORIDE 500 ML: 0.9 INJECTION, SOLUTION INTRAVENOUS at 01:45

## 2025-03-11 RX ADMIN — INSULIN GLARGINE 10 UNITS: 100 INJECTION, SOLUTION SUBCUTANEOUS at 09:06

## 2025-03-11 RX ADMIN — LORAZEPAM 1 MG: 2 INJECTION INTRAMUSCULAR; INTRAVENOUS at 19:25

## 2025-03-11 RX ADMIN — MIDODRINE HYDROCHLORIDE 10 MG: 10 TABLET ORAL at 03:43

## 2025-03-11 RX ADMIN — OXYCODONE HYDROCHLORIDE 2.5 MG: 5 TABLET ORAL at 19:24

## 2025-03-11 RX ADMIN — BUPROPION HYDROCHLORIDE 150 MG: 150 TABLET, EXTENDED RELEASE ORAL at 09:05

## 2025-03-11 RX ADMIN — LORAZEPAM 1 MG: 2 INJECTION INTRAMUSCULAR; INTRAVENOUS at 11:06

## 2025-03-11 RX ADMIN — OXYCODONE HYDROCHLORIDE AND ACETAMINOPHEN 1 TABLET: 5; 325 TABLET ORAL at 15:13

## 2025-03-11 RX ADMIN — FLUOXETINE HYDROCHLORIDE 40 MG: 20 CAPSULE ORAL at 09:05

## 2025-03-11 ASSESSMENT — PAIN - FUNCTIONAL ASSESSMENT: PAIN_FUNCTIONAL_ASSESSMENT: ACTIVITIES ARE NOT PREVENTED

## 2025-03-11 ASSESSMENT — PAIN DESCRIPTION - LOCATION
LOCATION: CHEST
LOCATION: HIP
LOCATION: CHEST
LOCATION: HIP
LOCATION: LEG

## 2025-03-11 ASSESSMENT — PAIN DESCRIPTION - DESCRIPTORS
DESCRIPTORS: ACHING
DESCRIPTORS: BURNING
DESCRIPTORS: BURNING
DESCRIPTORS: ACHING

## 2025-03-11 ASSESSMENT — PAIN DESCRIPTION - FREQUENCY: FREQUENCY: CONTINUOUS

## 2025-03-11 ASSESSMENT — PAIN SCALES - WONG BAKER
WONGBAKER_NUMERICALRESPONSE: NO HURT
WONGBAKER_NUMERICALRESPONSE: NO HURT

## 2025-03-11 ASSESSMENT — PAIN SCALES - GENERAL
PAINLEVEL_OUTOF10: 8
PAINLEVEL_OUTOF10: 8
PAINLEVEL_OUTOF10: 7
PAINLEVEL_OUTOF10: 8
PAINLEVEL_OUTOF10: 0
PAINLEVEL_OUTOF10: 8
PAINLEVEL_OUTOF10: 9
PAINLEVEL_OUTOF10: 7

## 2025-03-11 ASSESSMENT — PAIN DESCRIPTION - ORIENTATION
ORIENTATION: LEFT

## 2025-03-11 ASSESSMENT — PAIN DESCRIPTION - ONSET: ONSET: ON-GOING

## 2025-03-11 ASSESSMENT — PAIN DESCRIPTION - PAIN TYPE: TYPE: CHRONIC PAIN

## 2025-03-11 NOTE — CODE DOCUMENTATION
BP taken manual. BP dropping.   NP at bedside. Holding cardiac medications for now. Orders for midodrine now and a fluid bolus to gravity.   Orders to transfer patient to INT ICU.     Dr. Gary notified of consult via primary RN

## 2025-03-11 NOTE — CARE COORDINATION
ONGOING DISCHARGE PLAN:    Patient is alert and oriented x4.    Spoke with patient regarding discharge plan and patient confirms that plan is still home with [facility]. States she does not want SNF.  Writer did speak with Helene from [facility] who states pt has been accepted if she changes her mind.    Will continue to follow for additional discharge needs.    If patient is discharged prior to next notation, then this note serves as note for discharge by case management.    Electronically signed by Helene Vernon RN on 3/11/2025 at 2:27 PM

## 2025-03-11 NOTE — CODE DOCUMENTATION
NP at bedside, patient asymptomatic at this time. Reading vtach on the lifepack.   NP having patient blow into a straw.

## 2025-03-12 ENCOUNTER — APPOINTMENT (OUTPATIENT)
Dept: CT IMAGING | Age: 45
DRG: 637 | End: 2025-03-12
Payer: COMMERCIAL

## 2025-03-12 PROBLEM — R57.0 SHOCK, CARDIOGENIC (HCC): Status: ACTIVE | Noted: 2025-03-12

## 2025-03-12 LAB
ANION GAP SERPL CALCULATED.3IONS-SCNC: 11 MMOL/L (ref 9–16)
BUN SERPL-MCNC: 7 MG/DL (ref 6–20)
CALCIUM SERPL-MCNC: 8 MG/DL (ref 8.6–10.4)
CAMPYLOBACTER DNA SPEC NAA+PROBE: NORMAL
CHLORIDE SERPL-SCNC: 106 MMOL/L (ref 98–107)
CO2 SERPL-SCNC: 21 MMOL/L (ref 20–31)
CREAT SERPL-MCNC: 0.6 MG/DL (ref 0.7–1.2)
ETEC ELTA+ESTB GENES STL QL NAA+PROBE: NORMAL
GFR, ESTIMATED: >90 ML/MIN/1.73M2
GLUCOSE BLD-MCNC: 108 MG/DL (ref 65–105)
GLUCOSE BLD-MCNC: 226 MG/DL (ref 65–105)
GLUCOSE BLD-MCNC: 229 MG/DL (ref 65–105)
GLUCOSE BLD-MCNC: 269 MG/DL (ref 65–105)
GLUCOSE BLD-MCNC: 75 MG/DL (ref 65–105)
GLUCOSE SERPL-MCNC: 333 MG/DL (ref 74–99)
P SHIGELLOIDES DNA STL QL NAA+PROBE: NORMAL
POTASSIUM SERPL-SCNC: 4.8 MMOL/L (ref 3.7–5.3)
SALMONELLA DNA SPEC QL NAA+PROBE: NORMAL
SHIGA TOXIN STX GENE SPEC NAA+PROBE: NORMAL
SHIGELLA DNA SPEC QL NAA+PROBE: NORMAL
SODIUM SERPL-SCNC: 138 MMOL/L (ref 136–145)
SPECIMEN DESCRIPTION: NORMAL
V CHOL+PARA RFBL+TRKH+TNAA STL QL NAA+PR: NORMAL
Y ENTERO RECN STL QL NAA+PROBE: NORMAL

## 2025-03-12 PROCEDURE — 6360000002 HC RX W HCPCS

## 2025-03-12 PROCEDURE — 99232 SBSQ HOSP IP/OBS MODERATE 35: CPT | Performed by: STUDENT IN AN ORGANIZED HEALTH CARE EDUCATION/TRAINING PROGRAM

## 2025-03-12 PROCEDURE — 82947 ASSAY GLUCOSE BLOOD QUANT: CPT

## 2025-03-12 PROCEDURE — 80048 BASIC METABOLIC PNL TOTAL CA: CPT

## 2025-03-12 PROCEDURE — 6360000002 HC RX W HCPCS: Performed by: INTERNAL MEDICINE

## 2025-03-12 PROCEDURE — 6370000000 HC RX 637 (ALT 250 FOR IP)

## 2025-03-12 PROCEDURE — 6370000000 HC RX 637 (ALT 250 FOR IP): Performed by: INTERNAL MEDICINE

## 2025-03-12 PROCEDURE — 6370000000 HC RX 637 (ALT 250 FOR IP): Performed by: SPECIALIST

## 2025-03-12 PROCEDURE — 6370000000 HC RX 637 (ALT 250 FOR IP): Performed by: NURSE PRACTITIONER

## 2025-03-12 PROCEDURE — 74176 CT ABD & PELVIS W/O CONTRAST: CPT

## 2025-03-12 PROCEDURE — 99239 HOSP IP/OBS DSCHRG MGMT >30: CPT | Performed by: INTERNAL MEDICINE

## 2025-03-12 PROCEDURE — APPSS30 APP SPLIT SHARED TIME 16-30 MINUTES: Performed by: NURSE PRACTITIONER

## 2025-03-12 PROCEDURE — 36415 COLL VENOUS BLD VENIPUNCTURE: CPT

## 2025-03-12 PROCEDURE — 1200000000 HC SEMI PRIVATE

## 2025-03-12 RX ORDER — OXYCODONE HYDROCHLORIDE 5 MG/1
2.5 TABLET ORAL ONCE
Refills: 0 | Status: COMPLETED | OUTPATIENT
Start: 2025-03-12 | End: 2025-03-12

## 2025-03-12 RX ORDER — POLYETHYLENE GLYCOL 3350 17 G/17G
238 POWDER, FOR SOLUTION ORAL ONCE
Status: COMPLETED | OUTPATIENT
Start: 2025-03-12 | End: 2025-03-12

## 2025-03-12 RX ORDER — LOPERAMIDE HYDROCHLORIDE 2 MG/1
2 CAPSULE ORAL 4 TIMES DAILY PRN
Status: DISCONTINUED | OUTPATIENT
Start: 2025-03-12 | End: 2025-03-16 | Stop reason: HOSPADM

## 2025-03-12 RX ORDER — OXYCODONE AND ACETAMINOPHEN 5; 325 MG/1; MG/1
1 TABLET ORAL EVERY 6 HOURS PRN
Refills: 0 | Status: DISCONTINUED | OUTPATIENT
Start: 2025-03-12 | End: 2025-03-14

## 2025-03-12 RX ORDER — OXYCODONE HYDROCHLORIDE 5 MG/1
2.5 TABLET ORAL EVERY 6 HOURS PRN
Refills: 0 | Status: DISCONTINUED | OUTPATIENT
Start: 2025-03-12 | End: 2025-03-14

## 2025-03-12 RX ORDER — INSULIN GLARGINE 100 [IU]/ML
15 INJECTION, SOLUTION SUBCUTANEOUS DAILY
Status: DISCONTINUED | OUTPATIENT
Start: 2025-03-12 | End: 2025-03-16 | Stop reason: HOSPADM

## 2025-03-12 RX ORDER — DEXTROSE MONOHYDRATE 100 MG/ML
INJECTION, SOLUTION INTRAVENOUS CONTINUOUS PRN
Status: DISCONTINUED | OUTPATIENT
Start: 2025-03-12 | End: 2025-03-16 | Stop reason: HOSPADM

## 2025-03-12 RX ADMIN — LORAZEPAM 1 MG: 2 INJECTION INTRAMUSCULAR; INTRAVENOUS at 23:10

## 2025-03-12 RX ADMIN — LORAZEPAM 1 MG: 2 INJECTION INTRAMUSCULAR; INTRAVENOUS at 09:18

## 2025-03-12 RX ADMIN — FLECAINIDE ACETATE 100 MG: 100 TABLET ORAL at 09:21

## 2025-03-12 RX ADMIN — OXYCODONE HYDROCHLORIDE 2.5 MG: 5 TABLET ORAL at 17:35

## 2025-03-12 RX ADMIN — GABAPENTIN 300 MG: 300 CAPSULE ORAL at 21:24

## 2025-03-12 RX ADMIN — OXYCODONE HYDROCHLORIDE 2.5 MG: 5 TABLET ORAL at 21:24

## 2025-03-12 RX ADMIN — MICONAZOLE NITRATE: 20 POWDER TOPICAL at 21:24

## 2025-03-12 RX ADMIN — INSULIN LISPRO 2 UNITS: 100 INJECTION, SOLUTION INTRAVENOUS; SUBCUTANEOUS at 09:17

## 2025-03-12 RX ADMIN — FLECAINIDE ACETATE 100 MG: 100 TABLET ORAL at 21:53

## 2025-03-12 RX ADMIN — INSULIN GLARGINE 15 UNITS: 100 INJECTION, SOLUTION SUBCUTANEOUS at 09:17

## 2025-03-12 RX ADMIN — METOPROLOL TARTRATE 50 MG: 50 TABLET, FILM COATED ORAL at 09:18

## 2025-03-12 RX ADMIN — OXYCODONE HYDROCHLORIDE AND ACETAMINOPHEN 1 TABLET: 5; 325 TABLET ORAL at 17:35

## 2025-03-12 RX ADMIN — SUCRALFATE 1 G: 1 TABLET ORAL at 17:10

## 2025-03-12 RX ADMIN — OXYCODONE HYDROCHLORIDE 2.5 MG: 5 TABLET ORAL at 09:18

## 2025-03-12 RX ADMIN — BUPROPION HYDROCHLORIDE 150 MG: 150 TABLET, EXTENDED RELEASE ORAL at 09:18

## 2025-03-12 RX ADMIN — GABAPENTIN 300 MG: 300 CAPSULE ORAL at 14:16

## 2025-03-12 RX ADMIN — OXYCODONE HYDROCHLORIDE AND ACETAMINOPHEN 1 TABLET: 5; 325 TABLET ORAL at 09:18

## 2025-03-12 RX ADMIN — COLLAGENASE SANTYL: 250 OINTMENT TOPICAL at 09:23

## 2025-03-12 RX ADMIN — LORAZEPAM 1 MG: 2 INJECTION INTRAMUSCULAR; INTRAVENOUS at 14:16

## 2025-03-12 RX ADMIN — SUCRALFATE 1 G: 1 TABLET ORAL at 21:24

## 2025-03-12 RX ADMIN — OXYCODONE HYDROCHLORIDE AND ACETAMINOPHEN 1 TABLET: 5; 325 TABLET ORAL at 02:49

## 2025-03-12 RX ADMIN — POLYETHYLENE GLYCOL 3350 238 G: 17 POWDER, FOR SOLUTION ORAL at 22:00

## 2025-03-12 RX ADMIN — LORAZEPAM 1 MG: 2 INJECTION INTRAMUSCULAR; INTRAVENOUS at 01:19

## 2025-03-12 RX ADMIN — OXYCODONE HYDROCHLORIDE 2.5 MG: 5 TABLET ORAL at 02:51

## 2025-03-12 RX ADMIN — PANTOPRAZOLE SODIUM 40 MG: 40 TABLET, DELAYED RELEASE ORAL at 17:10

## 2025-03-12 RX ADMIN — FLUOXETINE HYDROCHLORIDE 40 MG: 20 CAPSULE ORAL at 09:17

## 2025-03-12 RX ADMIN — INSULIN LISPRO 2 UNITS: 100 INJECTION, SOLUTION INTRAVENOUS; SUBCUTANEOUS at 11:59

## 2025-03-12 RX ADMIN — LOPERAMIDE HYDROCHLORIDE 2 MG: 2 CAPSULE ORAL at 10:04

## 2025-03-12 RX ADMIN — SUCRALFATE 1 G: 1 TABLET ORAL at 09:17

## 2025-03-12 RX ADMIN — FONDAPARINUX SODIUM 7.5 MG: 7.5 INJECTION, SOLUTION SUBCUTANEOUS at 10:04

## 2025-03-12 RX ADMIN — CHOLESTYRAMINE 4 G: 4 POWDER, FOR SUSPENSION ORAL at 09:18

## 2025-03-12 RX ADMIN — PRAVASTATIN SODIUM 40 MG: 40 TABLET ORAL at 21:24

## 2025-03-12 RX ADMIN — GABAPENTIN 300 MG: 300 CAPSULE ORAL at 09:17

## 2025-03-12 RX ADMIN — METOPROLOL TARTRATE 50 MG: 50 TABLET, FILM COATED ORAL at 21:24

## 2025-03-12 RX ADMIN — MICONAZOLE NITRATE: 20 POWDER TOPICAL at 09:23

## 2025-03-12 RX ADMIN — PANTOPRAZOLE SODIUM 40 MG: 40 TABLET, DELAYED RELEASE ORAL at 06:14

## 2025-03-12 ASSESSMENT — PAIN DESCRIPTION - DESCRIPTORS
DESCRIPTORS: ACHING
DESCRIPTORS: SHARP
DESCRIPTORS: ACHING

## 2025-03-12 ASSESSMENT — PAIN DESCRIPTION - PAIN TYPE: TYPE: CHRONIC PAIN

## 2025-03-12 ASSESSMENT — PAIN SCALES - GENERAL
PAINLEVEL_OUTOF10: 7
PAINLEVEL_OUTOF10: 9
PAINLEVEL_OUTOF10: 7
PAINLEVEL_OUTOF10: 6
PAINLEVEL_OUTOF10: 0

## 2025-03-12 ASSESSMENT — PAIN DESCRIPTION - ONSET: ONSET: ON-GOING

## 2025-03-12 ASSESSMENT — PAIN DESCRIPTION - ORIENTATION
ORIENTATION: LEFT

## 2025-03-12 ASSESSMENT — PAIN DESCRIPTION - LOCATION
LOCATION: HIP

## 2025-03-12 ASSESSMENT — PAIN - FUNCTIONAL ASSESSMENT: PAIN_FUNCTIONAL_ASSESSMENT: PREVENTS OR INTERFERES SOME ACTIVE ACTIVITIES AND ADLS

## 2025-03-12 ASSESSMENT — PAIN DESCRIPTION - FREQUENCY: FREQUENCY: CONTINUOUS

## 2025-03-12 NOTE — CARE COORDINATION
ONGOING DISCHARGE PLAN:    Patient is alert and oriented x4.    Spoke with patient regarding discharge plan and patient confirms that plan is still would like to go home with [facility], but IS [facility]able [facility] as she is getting close to discharge.  Spoke with Helene from [facility] who will start insurance auth today.    CT abd/pelvis ordered for today.    Will continue to follow for additional discharge needs.    If patient is discharged prior to next notation, then this note serves as note for discharge by case management.    Electronically signed by Helene Vernon RN on 3/12/2025 at 10:56 AM

## 2025-03-12 NOTE — CARE COORDINATION
DISCHARGE PLANNING NOTE:    Received message from Helene at [facility] stating HENS is needed for insurance auth.  Message sent to Dr. Estrada asking for discharge order and HONG to be signed.    Electronically signed by Helene Vernon RN on 3/12/2025 at 2:27 PM    HENS completed.  Document ID : [id number]     Informed Helene from [place].    Electronically signed by Helene Vernon RN on 3/12/2025 at 3:33 PM

## 2025-03-13 LAB
ANION GAP SERPL CALCULATED.3IONS-SCNC: 6 MMOL/L (ref 9–16)
BUN SERPL-MCNC: 7 MG/DL (ref 6–20)
CALCIUM SERPL-MCNC: 7.8 MG/DL (ref 8.6–10.4)
CHLORIDE SERPL-SCNC: 107 MMOL/L (ref 98–107)
CO2 SERPL-SCNC: 23 MMOL/L (ref 20–31)
CREAT SERPL-MCNC: 0.5 MG/DL (ref 0.7–1.2)
GFR, ESTIMATED: >90 ML/MIN/1.73M2
GLUCOSE BLD-MCNC: 192 MG/DL (ref 65–105)
GLUCOSE BLD-MCNC: 280 MG/DL (ref 65–105)
GLUCOSE BLD-MCNC: 280 MG/DL (ref 65–105)
GLUCOSE BLD-MCNC: 96 MG/DL (ref 65–105)
GLUCOSE SERPL-MCNC: 305 MG/DL (ref 74–99)
POTASSIUM SERPL-SCNC: 4.4 MMOL/L (ref 3.7–5.3)
SODIUM SERPL-SCNC: 136 MMOL/L (ref 136–145)

## 2025-03-13 PROCEDURE — 82947 ASSAY GLUCOSE BLOOD QUANT: CPT

## 2025-03-13 PROCEDURE — 6360000002 HC RX W HCPCS

## 2025-03-13 PROCEDURE — 6370000000 HC RX 637 (ALT 250 FOR IP)

## 2025-03-13 PROCEDURE — 97116 GAIT TRAINING THERAPY: CPT

## 2025-03-13 PROCEDURE — 6370000000 HC RX 637 (ALT 250 FOR IP): Performed by: SPECIALIST

## 2025-03-13 PROCEDURE — 6370000000 HC RX 637 (ALT 250 FOR IP): Performed by: INTERNAL MEDICINE

## 2025-03-13 PROCEDURE — 80048 BASIC METABOLIC PNL TOTAL CA: CPT

## 2025-03-13 PROCEDURE — P9047 ALBUMIN (HUMAN), 25%, 50ML: HCPCS

## 2025-03-13 PROCEDURE — 97530 THERAPEUTIC ACTIVITIES: CPT

## 2025-03-13 PROCEDURE — 99232 SBSQ HOSP IP/OBS MODERATE 35: CPT | Performed by: STUDENT IN AN ORGANIZED HEALTH CARE EDUCATION/TRAINING PROGRAM

## 2025-03-13 PROCEDURE — 1200000000 HC SEMI PRIVATE

## 2025-03-13 PROCEDURE — 97110 THERAPEUTIC EXERCISES: CPT

## 2025-03-13 PROCEDURE — 2500000003 HC RX 250 WO HCPCS: Performed by: INTERNAL MEDICINE

## 2025-03-13 PROCEDURE — 99233 SBSQ HOSP IP/OBS HIGH 50: CPT | Performed by: INTERNAL MEDICINE

## 2025-03-13 PROCEDURE — 6360000002 HC RX W HCPCS: Performed by: INTERNAL MEDICINE

## 2025-03-13 PROCEDURE — 97162 PT EVAL MOD COMPLEX 30 MIN: CPT

## 2025-03-13 PROCEDURE — APPSS30 APP SPLIT SHARED TIME 16-30 MINUTES: Performed by: NURSE PRACTITIONER

## 2025-03-13 PROCEDURE — 36415 COLL VENOUS BLD VENIPUNCTURE: CPT

## 2025-03-13 RX ORDER — ALBUMIN (HUMAN) 12.5 G/50ML
25 SOLUTION INTRAVENOUS ONCE
Status: COMPLETED | OUTPATIENT
Start: 2025-03-13 | End: 2025-03-14

## 2025-03-13 RX ORDER — OXYCODONE HYDROCHLORIDE 5 MG/1
2.5 TABLET ORAL ONCE
Refills: 0 | Status: COMPLETED | OUTPATIENT
Start: 2025-03-13 | End: 2025-03-13

## 2025-03-13 RX ORDER — OXYCODONE AND ACETAMINOPHEN 5; 325 MG/1; MG/1
1 TABLET ORAL ONCE
Refills: 0 | Status: COMPLETED | OUTPATIENT
Start: 2025-03-13 | End: 2025-03-13

## 2025-03-13 RX ADMIN — INSULIN GLARGINE 15 UNITS: 100 INJECTION, SOLUTION SUBCUTANEOUS at 08:03

## 2025-03-13 RX ADMIN — MICONAZOLE NITRATE: 20 POWDER TOPICAL at 08:03

## 2025-03-13 RX ADMIN — LORAZEPAM 1 MG: 2 INJECTION INTRAMUSCULAR; INTRAVENOUS at 16:19

## 2025-03-13 RX ADMIN — OXYCODONE HYDROCHLORIDE AND ACETAMINOPHEN 1 TABLET: 5; 325 TABLET ORAL at 14:39

## 2025-03-13 RX ADMIN — PANTOPRAZOLE SODIUM 40 MG: 40 TABLET, DELAYED RELEASE ORAL at 14:39

## 2025-03-13 RX ADMIN — SUCRALFATE 1 G: 1 TABLET ORAL at 08:02

## 2025-03-13 RX ADMIN — FONDAPARINUX SODIUM 7.5 MG: 7.5 INJECTION, SOLUTION SUBCUTANEOUS at 09:00

## 2025-03-13 RX ADMIN — PRAVASTATIN SODIUM 40 MG: 40 TABLET ORAL at 21:43

## 2025-03-13 RX ADMIN — INSULIN LISPRO 2 UNITS: 100 INJECTION, SOLUTION INTRAVENOUS; SUBCUTANEOUS at 16:19

## 2025-03-13 RX ADMIN — INSULIN LISPRO 4 UNITS: 100 INJECTION, SOLUTION INTRAVENOUS; SUBCUTANEOUS at 08:03

## 2025-03-13 RX ADMIN — LORAZEPAM 1 MG: 2 INJECTION INTRAMUSCULAR; INTRAVENOUS at 04:18

## 2025-03-13 RX ADMIN — OXYCODONE HYDROCHLORIDE 2.5 MG: 5 TABLET ORAL at 08:03

## 2025-03-13 RX ADMIN — PANTOPRAZOLE SODIUM 40 MG: 40 TABLET, DELAYED RELEASE ORAL at 06:18

## 2025-03-13 RX ADMIN — OXYCODONE HYDROCHLORIDE AND ACETAMINOPHEN 1 TABLET: 5; 325 TABLET ORAL at 00:29

## 2025-03-13 RX ADMIN — GABAPENTIN 300 MG: 300 CAPSULE ORAL at 21:43

## 2025-03-13 RX ADMIN — INSULIN LISPRO 4 UNITS: 100 INJECTION, SOLUTION INTRAVENOUS; SUBCUTANEOUS at 21:47

## 2025-03-13 RX ADMIN — FLUOXETINE HYDROCHLORIDE 40 MG: 20 CAPSULE ORAL at 08:02

## 2025-03-13 RX ADMIN — BUPROPION HYDROCHLORIDE 150 MG: 150 TABLET, EXTENDED RELEASE ORAL at 08:02

## 2025-03-13 RX ADMIN — OXYCODONE HYDROCHLORIDE AND ACETAMINOPHEN 1 TABLET: 5; 325 TABLET ORAL at 11:35

## 2025-03-13 RX ADMIN — LORAZEPAM 1 MG: 2 INJECTION INTRAMUSCULAR; INTRAVENOUS at 11:35

## 2025-03-13 RX ADMIN — ALBUMIN (HUMAN) 25 G: 0.25 INJECTION, SOLUTION INTRAVENOUS at 22:45

## 2025-03-13 RX ADMIN — SUCRALFATE 1 G: 1 TABLET ORAL at 16:19

## 2025-03-13 RX ADMIN — OXYCODONE HYDROCHLORIDE AND ACETAMINOPHEN 1 TABLET: 5; 325 TABLET ORAL at 08:02

## 2025-03-13 RX ADMIN — LORAZEPAM 1 MG: 2 INJECTION INTRAMUSCULAR; INTRAVENOUS at 20:15

## 2025-03-13 RX ADMIN — OXYCODONE HYDROCHLORIDE 2.5 MG: 5 TABLET ORAL at 11:35

## 2025-03-13 RX ADMIN — OXYCODONE HYDROCHLORIDE 2.5 MG: 5 TABLET ORAL at 14:39

## 2025-03-13 RX ADMIN — FLECAINIDE ACETATE 100 MG: 100 TABLET ORAL at 09:00

## 2025-03-13 RX ADMIN — GABAPENTIN 300 MG: 300 CAPSULE ORAL at 14:39

## 2025-03-13 RX ADMIN — MICONAZOLE NITRATE: 20 POWDER TOPICAL at 21:48

## 2025-03-13 RX ADMIN — ONDANSETRON 4 MG: 2 INJECTION, SOLUTION INTRAMUSCULAR; INTRAVENOUS at 08:49

## 2025-03-13 RX ADMIN — METOPROLOL TARTRATE 50 MG: 50 TABLET, FILM COATED ORAL at 08:02

## 2025-03-13 RX ADMIN — TIZANIDINE 4 MG: 4 TABLET ORAL at 20:15

## 2025-03-13 RX ADMIN — OXYCODONE HYDROCHLORIDE 2.5 MG: 5 TABLET ORAL at 00:29

## 2025-03-13 RX ADMIN — COLLAGENASE SANTYL: 250 OINTMENT TOPICAL at 08:03

## 2025-03-13 RX ADMIN — GABAPENTIN 300 MG: 300 CAPSULE ORAL at 08:02

## 2025-03-13 RX ADMIN — SUCRALFATE 1 G: 1 TABLET ORAL at 14:39

## 2025-03-13 ASSESSMENT — PAIN DESCRIPTION - ORIENTATION
ORIENTATION: LEFT;RIGHT
ORIENTATION: LEFT
ORIENTATION: LEFT

## 2025-03-13 ASSESSMENT — PAIN DESCRIPTION - LOCATION
LOCATION: HIP
LOCATION: LEG;HIP
LOCATION: BACK;HIP

## 2025-03-13 ASSESSMENT — PAIN DESCRIPTION - DESCRIPTORS
DESCRIPTORS: ACHING;SHARP
DESCRIPTORS: ACHING

## 2025-03-13 ASSESSMENT — PAIN SCALES - GENERAL
PAINLEVEL_OUTOF10: 8
PAINLEVEL_OUTOF10: 8
PAINLEVEL_OUTOF10: 5
PAINLEVEL_OUTOF10: 8

## 2025-03-13 ASSESSMENT — PAIN - FUNCTIONAL ASSESSMENT
PAIN_FUNCTIONAL_ASSESSMENT: PREVENTS OR INTERFERES SOME ACTIVE ACTIVITIES AND ADLS
PAIN_FUNCTIONAL_ASSESSMENT: ACTIVITIES ARE NOT PREVENTED

## 2025-03-13 NOTE — FLOWSHEET NOTE
03/12/25 2046   Treatment Team Notification   Reason for Communication Evaluate   Name of Team Member Notified Lynne Pickard   Treatment Team Role Advanced Practice Nurse   Method of Communication Secure Message   Response Waiting for response   Notification Time 2046       NP notified for clarification of pain med orders and breakthrough pain.

## 2025-03-13 NOTE — CARE COORDINATION
DISCHARGE PLANNING NOTE:    LSW following for potential discharge to SNF. Patient accepted at [facility]. Insurance authorization submitted on 3/12. Still pending at this time per Helene in admissions. Message to PT/OT staff to see patient this am for updated notes as patient has not worked with therapy     Message from PT staff, patient expresses interest in ARU. Message to bedside RN/Charge RN for PM&R consult.

## 2025-03-14 ENCOUNTER — ANESTHESIA EVENT (OUTPATIENT)
Dept: ENDOSCOPY | Age: 45
End: 2025-03-14
Payer: COMMERCIAL

## 2025-03-14 ENCOUNTER — ANESTHESIA (OUTPATIENT)
Dept: ENDOSCOPY | Age: 45
End: 2025-03-14
Payer: COMMERCIAL

## 2025-03-14 PROBLEM — K28.9 ANASTOMOTIC ULCER: Status: ACTIVE | Noted: 2025-03-14

## 2025-03-14 LAB
ALBUMIN SERPL-MCNC: 2.4 G/DL (ref 3.5–5.2)
ALP SERPL-CCNC: 198 U/L (ref 35–104)
ALT SERPL-CCNC: 53 U/L (ref 10–35)
ANION GAP SERPL CALCULATED.3IONS-SCNC: 9 MMOL/L (ref 9–16)
AST SERPL-CCNC: 101 U/L (ref 10–35)
BILIRUB SERPL-MCNC: 0.2 MG/DL (ref 0–1.2)
BUN SERPL-MCNC: 9 MG/DL (ref 6–20)
CALCIUM SERPL-MCNC: 8 MG/DL (ref 8.6–10.4)
CHLORIDE SERPL-SCNC: 112 MMOL/L (ref 98–107)
CO2 SERPL-SCNC: 22 MMOL/L (ref 20–31)
CREAT SERPL-MCNC: 0.5 MG/DL (ref 0.7–1.2)
ERYTHROCYTE [DISTWIDTH] IN BLOOD BY AUTOMATED COUNT: 19.4 % (ref 11.5–14.9)
FECAL PANCREATIC ELASTASE-1: <10 UG/G
GFR, ESTIMATED: >90 ML/MIN/1.73M2
GLUCOSE BLD-MCNC: 177 MG/DL (ref 65–105)
GLUCOSE BLD-MCNC: 258 MG/DL (ref 65–105)
GLUCOSE BLD-MCNC: 66 MG/DL (ref 65–105)
GLUCOSE BLD-MCNC: 67 MG/DL (ref 65–105)
GLUCOSE BLD-MCNC: 73 MG/DL (ref 65–105)
GLUCOSE BLD-MCNC: 77 MG/DL (ref 65–105)
GLUCOSE BLD-MCNC: 87 MG/DL (ref 65–105)
GLUCOSE SERPL-MCNC: 66 MG/DL (ref 74–99)
HCT VFR BLD AUTO: 35.7 % (ref 36–46)
HGB BLD-MCNC: 11.3 G/DL (ref 12–16)
MCH RBC QN AUTO: 28.6 PG (ref 26–34)
MCHC RBC AUTO-ENTMCNC: 31.6 G/DL (ref 31–37)
MCV RBC AUTO: 90.5 FL (ref 80–100)
PLATELET # BLD AUTO: 283 K/UL (ref 150–450)
PMV BLD AUTO: 9.7 FL (ref 6–12)
POTASSIUM SERPL-SCNC: 4.3 MMOL/L (ref 3.7–5.3)
PROT SERPL-MCNC: 4.9 G/DL (ref 6.6–8.7)
RBC # BLD AUTO: 3.95 M/UL (ref 4–5.2)
SODIUM SERPL-SCNC: 143 MMOL/L (ref 136–145)
WBC OTHER # BLD: 8.2 K/UL (ref 3.5–11)

## 2025-03-14 PROCEDURE — 3700000000 HC ANESTHESIA ATTENDED CARE: Performed by: STUDENT IN AN ORGANIZED HEALTH CARE EDUCATION/TRAINING PROGRAM

## 2025-03-14 PROCEDURE — 6370000000 HC RX 637 (ALT 250 FOR IP): Performed by: INTERNAL MEDICINE

## 2025-03-14 PROCEDURE — 6370000000 HC RX 637 (ALT 250 FOR IP): Performed by: STUDENT IN AN ORGANIZED HEALTH CARE EDUCATION/TRAINING PROGRAM

## 2025-03-14 PROCEDURE — 6360000002 HC RX W HCPCS

## 2025-03-14 PROCEDURE — 97530 THERAPEUTIC ACTIVITIES: CPT

## 2025-03-14 PROCEDURE — 97110 THERAPEUTIC EXERCISES: CPT

## 2025-03-14 PROCEDURE — 80053 COMPREHEN METABOLIC PANEL: CPT

## 2025-03-14 PROCEDURE — 6370000000 HC RX 637 (ALT 250 FOR IP)

## 2025-03-14 PROCEDURE — 43239 EGD BIOPSY SINGLE/MULTIPLE: CPT | Performed by: STUDENT IN AN ORGANIZED HEALTH CARE EDUCATION/TRAINING PROGRAM

## 2025-03-14 PROCEDURE — 36415 COLL VENOUS BLD VENIPUNCTURE: CPT

## 2025-03-14 PROCEDURE — 83993 ASSAY FOR CALPROTECTIN FECAL: CPT

## 2025-03-14 PROCEDURE — 0DB58ZX EXCISION OF ESOPHAGUS, VIA NATURAL OR ARTIFICIAL OPENING ENDOSCOPIC, DIAGNOSTIC: ICD-10-PCS | Performed by: STUDENT IN AN ORGANIZED HEALTH CARE EDUCATION/TRAINING PROGRAM

## 2025-03-14 PROCEDURE — 6360000002 HC RX W HCPCS: Performed by: NURSE ANESTHETIST, CERTIFIED REGISTERED

## 2025-03-14 PROCEDURE — 2580000003 HC RX 258: Performed by: NURSE ANESTHETIST, CERTIFIED REGISTERED

## 2025-03-14 PROCEDURE — 99233 SBSQ HOSP IP/OBS HIGH 50: CPT | Performed by: INTERNAL MEDICINE

## 2025-03-14 PROCEDURE — 0DB98ZX EXCISION OF DUODENUM, VIA NATURAL OR ARTIFICIAL OPENING ENDOSCOPIC, DIAGNOSTIC: ICD-10-PCS | Performed by: STUDENT IN AN ORGANIZED HEALTH CARE EDUCATION/TRAINING PROGRAM

## 2025-03-14 PROCEDURE — 99222 1ST HOSP IP/OBS MODERATE 55: CPT

## 2025-03-14 PROCEDURE — 2709999900 HC NON-CHARGEABLE SUPPLY: Performed by: STUDENT IN AN ORGANIZED HEALTH CARE EDUCATION/TRAINING PROGRAM

## 2025-03-14 PROCEDURE — C1726 CATH, BAL DIL, NON-VASCULAR: HCPCS | Performed by: STUDENT IN AN ORGANIZED HEALTH CARE EDUCATION/TRAINING PROGRAM

## 2025-03-14 PROCEDURE — 2580000003 HC RX 258

## 2025-03-14 PROCEDURE — 0D788ZZ DILATION OF SMALL INTESTINE, VIA NATURAL OR ARTIFICIAL OPENING ENDOSCOPIC: ICD-10-PCS | Performed by: STUDENT IN AN ORGANIZED HEALTH CARE EDUCATION/TRAINING PROGRAM

## 2025-03-14 PROCEDURE — 85027 COMPLETE CBC AUTOMATED: CPT

## 2025-03-14 PROCEDURE — 1200000000 HC SEMI PRIVATE

## 2025-03-14 PROCEDURE — 6370000000 HC RX 637 (ALT 250 FOR IP): Performed by: SPECIALIST

## 2025-03-14 PROCEDURE — 7100000001 HC PACU RECOVERY - ADDTL 15 MIN: Performed by: STUDENT IN AN ORGANIZED HEALTH CARE EDUCATION/TRAINING PROGRAM

## 2025-03-14 PROCEDURE — 82947 ASSAY GLUCOSE BLOOD QUANT: CPT

## 2025-03-14 PROCEDURE — 43245 EGD DILATE STRICTURE: CPT | Performed by: STUDENT IN AN ORGANIZED HEALTH CARE EDUCATION/TRAINING PROGRAM

## 2025-03-14 PROCEDURE — 7100000000 HC PACU RECOVERY - FIRST 15 MIN: Performed by: STUDENT IN AN ORGANIZED HEALTH CARE EDUCATION/TRAINING PROGRAM

## 2025-03-14 PROCEDURE — 2580000003 HC RX 258: Performed by: INTERNAL MEDICINE

## 2025-03-14 PROCEDURE — 3609012400 HC EGD TRANSORAL BIOPSY SINGLE/MULTIPLE: Performed by: STUDENT IN AN ORGANIZED HEALTH CARE EDUCATION/TRAINING PROGRAM

## 2025-03-14 PROCEDURE — 88305 TISSUE EXAM BY PATHOLOGIST: CPT

## 2025-03-14 PROCEDURE — 3700000001 HC ADD 15 MINUTES (ANESTHESIA): Performed by: STUDENT IN AN ORGANIZED HEALTH CARE EDUCATION/TRAINING PROGRAM

## 2025-03-14 RX ORDER — PROPOFOL 10 MG/ML
INJECTION, EMULSION INTRAVENOUS
Status: DISCONTINUED | OUTPATIENT
Start: 2025-03-14 | End: 2025-03-14 | Stop reason: SDUPTHER

## 2025-03-14 RX ORDER — SODIUM CHLORIDE 9 MG/ML
INJECTION, SOLUTION INTRAVENOUS
Status: DISCONTINUED | OUTPATIENT
Start: 2025-03-14 | End: 2025-03-14 | Stop reason: SDUPTHER

## 2025-03-14 RX ORDER — SIMETHICONE 40MG/0.6ML
SUSPENSION, DROPS(FINAL DOSAGE FORM)(ML) ORAL PRN
Status: DISCONTINUED | OUTPATIENT
Start: 2025-03-14 | End: 2025-03-14 | Stop reason: ALTCHOICE

## 2025-03-14 RX ORDER — LIDOCAINE HYDROCHLORIDE 20 MG/ML
INJECTION, SOLUTION EPIDURAL; INFILTRATION; INTRACAUDAL; PERINEURAL
Status: DISCONTINUED | OUTPATIENT
Start: 2025-03-14 | End: 2025-03-14 | Stop reason: SDUPTHER

## 2025-03-14 RX ORDER — OXYCODONE AND ACETAMINOPHEN 5; 325 MG/1; MG/1
1 TABLET ORAL EVERY 8 HOURS PRN
Refills: 0 | Status: DISCONTINUED | OUTPATIENT
Start: 2025-03-14 | End: 2025-03-16 | Stop reason: HOSPADM

## 2025-03-14 RX ORDER — OXYCODONE HYDROCHLORIDE 5 MG/1
2.5 TABLET ORAL ONCE
Refills: 0 | Status: COMPLETED | OUTPATIENT
Start: 2025-03-15 | End: 2025-03-15

## 2025-03-14 RX ORDER — CALCIUM GLUCONATE 20 MG/ML
2000 INJECTION, SOLUTION INTRAVENOUS ONCE
Status: COMPLETED | OUTPATIENT
Start: 2025-03-14 | End: 2025-03-14

## 2025-03-14 RX ORDER — MIDODRINE HYDROCHLORIDE 5 MG/1
5 TABLET ORAL ONCE
Status: COMPLETED | OUTPATIENT
Start: 2025-03-14 | End: 2025-03-14

## 2025-03-14 RX ORDER — 0.9 % SODIUM CHLORIDE 0.9 %
500 INTRAVENOUS SOLUTION INTRAVENOUS ONCE
Status: COMPLETED | OUTPATIENT
Start: 2025-03-14 | End: 2025-03-14

## 2025-03-14 RX ORDER — CLOBETASOL PROPIONATE 0.5 MG/G
OINTMENT TOPICAL DAILY
Status: DISCONTINUED | OUTPATIENT
Start: 2025-03-15 | End: 2025-03-16 | Stop reason: HOSPADM

## 2025-03-14 RX ORDER — SODIUM CHLORIDE 9 MG/ML
INJECTION, SOLUTION INTRAVENOUS CONTINUOUS
Status: ACTIVE | OUTPATIENT
Start: 2025-03-14 | End: 2025-03-14

## 2025-03-14 RX ORDER — OXYCODONE HYDROCHLORIDE 5 MG/1
2.5 TABLET ORAL EVERY 8 HOURS PRN
Refills: 0 | Status: DISCONTINUED | OUTPATIENT
Start: 2025-03-14 | End: 2025-03-16 | Stop reason: HOSPADM

## 2025-03-14 RX ADMIN — PROPOFOL 60 MG: 10 INJECTION, EMULSION INTRAVENOUS at 14:24

## 2025-03-14 RX ADMIN — SUCRALFATE 1 G: 1 TABLET ORAL at 16:34

## 2025-03-14 RX ADMIN — OXYCODONE HYDROCHLORIDE AND ACETAMINOPHEN 1 TABLET: 5; 325 TABLET ORAL at 11:07

## 2025-03-14 RX ADMIN — INSULIN LISPRO 4 UNITS: 100 INJECTION, SOLUTION INTRAVENOUS; SUBCUTANEOUS at 19:52

## 2025-03-14 RX ADMIN — CALCIUM GLUCONATE 2000 MG: 20 INJECTION, SOLUTION INTRAVENOUS at 04:17

## 2025-03-14 RX ADMIN — MICONAZOLE NITRATE: 20 POWDER TOPICAL at 11:09

## 2025-03-14 RX ADMIN — SODIUM CHLORIDE: 0.9 INJECTION, SOLUTION INTRAVENOUS at 11:17

## 2025-03-14 RX ADMIN — COLLAGENASE SANTYL: 250 OINTMENT TOPICAL at 12:17

## 2025-03-14 RX ADMIN — FLECAINIDE ACETATE 100 MG: 100 TABLET ORAL at 09:07

## 2025-03-14 RX ADMIN — PROPOFOL 90 MG: 10 INJECTION, EMULSION INTRAVENOUS at 14:18

## 2025-03-14 RX ADMIN — OXYCODONE HYDROCHLORIDE 2.5 MG: 5 TABLET ORAL at 19:51

## 2025-03-14 RX ADMIN — PROPOFOL 60 MG: 10 INJECTION, EMULSION INTRAVENOUS at 14:29

## 2025-03-14 RX ADMIN — SUCRALFATE 1 G: 1 TABLET ORAL at 19:51

## 2025-03-14 RX ADMIN — OXYCODONE HYDROCHLORIDE AND ACETAMINOPHEN 1 TABLET: 5; 325 TABLET ORAL at 19:51

## 2025-03-14 RX ADMIN — PRAVASTATIN SODIUM 40 MG: 40 TABLET ORAL at 19:52

## 2025-03-14 RX ADMIN — MICONAZOLE NITRATE: 20 POWDER TOPICAL at 19:53

## 2025-03-14 RX ADMIN — OXYCODONE HYDROCHLORIDE 2.5 MG: 5 TABLET ORAL at 11:07

## 2025-03-14 RX ADMIN — PANTOPRAZOLE SODIUM 40 MG: 40 TABLET, DELAYED RELEASE ORAL at 16:34

## 2025-03-14 RX ADMIN — MIDODRINE HYDROCHLORIDE 5 MG: 5 TABLET ORAL at 00:45

## 2025-03-14 RX ADMIN — LIDOCAINE HYDROCHLORIDE 80 MG: 20 INJECTION, SOLUTION EPIDURAL; INFILTRATION; INTRACAUDAL; PERINEURAL at 14:18

## 2025-03-14 RX ADMIN — SODIUM CHLORIDE: 9 INJECTION, SOLUTION INTRAVENOUS at 14:00

## 2025-03-14 RX ADMIN — SODIUM CHLORIDE 500 ML: 0.9 INJECTION, SOLUTION INTRAVENOUS at 04:15

## 2025-03-14 RX ADMIN — FLECAINIDE ACETATE 100 MG: 100 TABLET ORAL at 19:54

## 2025-03-14 RX ADMIN — METOPROLOL TARTRATE 50 MG: 50 TABLET, FILM COATED ORAL at 19:52

## 2025-03-14 ASSESSMENT — PAIN DESCRIPTION - DESCRIPTORS
DESCRIPTORS: ACHING
DESCRIPTORS: ACHING;DISCOMFORT;SHARP

## 2025-03-14 ASSESSMENT — PAIN SCALES - WONG BAKER
WONGBAKER_NUMERICALRESPONSE: NO HURT

## 2025-03-14 ASSESSMENT — PAIN SCALES - GENERAL
PAINLEVEL_OUTOF10: 6
PAINLEVEL_OUTOF10: 7
PAINLEVEL_OUTOF10: 5
PAINLEVEL_OUTOF10: 5
PAINLEVEL_OUTOF10: 10
PAINLEVEL_OUTOF10: 3
PAINLEVEL_OUTOF10: 0

## 2025-03-14 ASSESSMENT — PAIN DESCRIPTION - LOCATION
LOCATION: LEG
LOCATION: BACK

## 2025-03-14 ASSESSMENT — PAIN - FUNCTIONAL ASSESSMENT: PAIN_FUNCTIONAL_ASSESSMENT: 0-10

## 2025-03-14 ASSESSMENT — PAIN DESCRIPTION - ORIENTATION: ORIENTATION: LEFT

## 2025-03-14 ASSESSMENT — ENCOUNTER SYMPTOMS: SHORTNESS OF BREATH: 0

## 2025-03-14 NOTE — CONSULTS
Surgery (2013); Dilation and curettage of uterus (); Finger amputation (Left, 2015); lymph node biopsy (); Total abdominal hysterectomy w/ bilateral salpingoophorectomy (); IVC filter insertion; hip surgery (Left); Ankle fracture surgery (Left); hc cath power picc single (2023); Ankle fracture surgery (Left, 10/24/2023); Esophagogastroduodenoscopy (2021); Esophagogastroduodenoscopy (2013); laparoscopy (2013); Ankle surgery (Left, 2019);  section; hip surgery (Left, 2023); hip surgery (Left, 2023); hip surgery (Left, 2023); hip surgery (Left); Ankle surgery (Left, 2023); Upper gastrointestinal endoscopy (N/A, 2024); Upper gastrointestinal endoscopy (N/A, 2025); and XR MIDLINE EQUAL OR GREATER THAN 5 YEARS (2013).    Current Medications  Reviewed. See EMR for details.    Allergies  Allergies have been reviewed.  Krista is allergic to aripiprazole, aspirin, betadine [povidone iodine], celebrex [celecoxib], celexa [citalopram hydrobromide], citalopram, furosemide, nitrofurantoin, tricyclic antidepressants, codeine, lithium, paroxetine, paxil [paroxetine hcl], povidone iodine, sertraline, tape [adhesive tape], and tegretol [carbamazepine].    Social History  Krista  reports that she has never smoked. She has never used smokeless tobacco. She reports that she does not currently use alcohol. She reports that she does not use drugs.    Family History  Krista's family history includes Breast Cancer in her maternal aunt and maternal cousin; Cancer in her maternal uncle; Depression in her mother; Diabetes in her father; Heart Disease in her father; High Blood Pressure in her father and mother; High Cholesterol in her father and mother; Kidney Disease in her father; No Known Problems in her brother; Stroke in her father.      Review of Systems   History obtained from the patient.   Constitution: no fever or 
loss  Eyes: negative for redness and visual disturbance  Ears, nose, mouth, throat, and face: negative for earaches, sore throat and tinnitus  Respiratory: negative for cough and shortness of breath  Cardiovascular: negative for chest pain, dyspnea and palpitations  Gastrointestinal: +nausea and vomiting; +diarhea  Genitourinary:negative for dysuria, frequency, hesitancy and urinary incontinence  Integument/breast: negative for pruritus and rash  Musculoskeletal:+L hip pain  Neurological: negative for dizziness, headaches   Behavioral/Psych: negative for decreased appetite, depression        Premorbid function:  Independent ADLS - uses WC throughout mobile home and walker when using bathroom     Current function:  Restrictions/ Precautions-  Restrictions/Precautions  Restrictions/Precautions: Fall Risk, General Precautions, Contact Precautions (contact precautions ESBL, VRE, CRE, MDRO, MRSA)  Activity Level: Up as Tolerated, Up with Assist  Required Braces or Orthoses?: Yes (shoe with a lift is supposed to be ready for pt soon, pt does not currently have the shoe with the lift. pt had an appointment scheduled with [facility] for today to be finishing being fitted for the specal shoe per pt report)  Implants Present? : Metal implants (Left ankle metal from previous surgery)    PT:    Bed Mobility-  Bed mobility  Rolling to Right: Contact guard assistance  Supine to Sit: Stand by assistance  Sit to Supine: Minimal assistance (assist with LLE into bed only)  Scooting: Stand by assistance  Bed Mobility Comments: increased time for transitional movements with utilizing UEs to move L LE, requiring assistance with lifting LE back into bed    Transfers-  Transfers  Sit to Stand: Contact guard assistance  Stand to Sit: Contact guard assistance  Bed to Chair: Contact guard assistance (with use of RW)  Comment: with use of RW     Ambulation-  Ambulation  Surface: Level tile  Device: Rolling Walker  Assistance: Contact guard 
positive, Muscle weakness, Obsessive-compulsive disorder, unspecified, Opioid abuse, in remission ([facility]), Pernicious anemia, Polyneuropathy, unspecified, PTSD (post-traumatic stress disorder), Pulmonary embolism (HCC), Suicidal behavior, SVT (supraventricular tachycardia), Type 2 diabetes mellitus, with long-term current use of insulin ([facility]), Under care of team, and Under care of team.    Past Surgical History:   has a past surgical history that includes Cholecystectomy; Liver Resection (); Tonsillectomy; Abdominal hernia repair (); Abdominal hernia repair (2014); Bariatric Surgery (2013); Dilation and curettage of uterus (); Finger amputation (Left, 2015); lymph node biopsy (); Total abdominal hysterectomy w/ bilateral salpingoophorectomy (); IVC filter insertion; hip surgery (Left); Ankle fracture surgery (Left); hc cath power picc single (2023); Ankle fracture surgery (Left, 10/24/2023); Esophagogastroduodenoscopy (2021); Esophagogastroduodenoscopy (2013); laparoscopy (2013); Ankle surgery (Left, 2019);  section; hip surgery (Left, 2023); hip surgery (Left, 2023); hip surgery (Left, 2023); hip surgery (Left); Ankle surgery (Left, 2023); Upper gastrointestinal endoscopy (N/A, 2024); Upper gastrointestinal endoscopy (N/A, 2025); and XR MIDLINE EQUAL OR GREATER THAN 5 YEARS (2013).     Home Medications:    Prior to Admission medications    Medication Sig Start Date End Date Taking? Authorizing Provider   ascorbic acid (VITAMIN C) 500 MG tablet Take 1 tablet by mouth daily 25  Yes Fidel Torre MD   bumetanide (BUMEX) 2 MG tablet Take 1 tablet by mouth daily as needed (For swelling) 25  Yes Fidel Torre MD   zinc sulfate (ZINCATE) 220 (50 Zn)  mg capsule - elemental zinc Take 1 capsule by mouth daily 25  Yes Fidel Torre MD   Multiple Vitamins-Minerals (THERAPEUTIC 
is narrow QRS tachycardia of supraventricular origin, probably of the short RP type tachycardia.  That is usually consistent with AV ninfa reentrant tachycardia, although other types could not be ruled out completely.    There is a wide QRS tachycardia that was documented earlier today, it started shortly after the SVT was a started, whether it is aberrancy of the SVT or VT that is induced by and because of the SVT I could not rule that possibility out.  The fact that the patient went from narrow to wide and then to narrow probably favors aberrancy again could not be 100% ruled out.    This lady will definitely benefit from EP and ablation.  Somebody talk to her about it in December she said.    I am going to DC her amiodarone and start her on flecainide and beta-blockers.    Will arrange for her EP to be done as an outpatient.  But this procedure should be done soon and not to be delayed significantly,    Amiodarone could suppress the induction of tachycardia and make things harder in the future when we attempt to do ablation.  I would like to avoid amiodarone if possible.    Above was discussed with the patient and with the nursing team.    I will discuss the case with Dr. Gary and proceed from there.      
[facility] 05/03/2023 04:33 AM         Impression:  DKA, did not take insulin for 3 days  Diabetes mellitus type II insulin-dependent, type I, A1c 8.9 December 2024  Pseudohyponatremia  Diabetic neuropathy  Gastroparesis? chronic vomiting and nausea  Reports history of anastomotic ulcers near her gastric bypass followed by Dr. Marques  OCD/PTSD/bipolar with history of suicidal attempts in the past  History of bariatric surgery gastric Awa-en-Y 2013 [facility]  History of recurrent pulmonary embolism and DVT most recent 2022-Per problem list history of antiphospholipid antibody syndrome, saw Dr. Loli Landry in 2022, also saw our group in 2015 with pulmonary embolism at [facility], on Arixtra injection daily, IVC filter in place  Left humeral head resected after failed hip replacement from a fall and fracture that got infected with ESBL positive organisms, ultimately had resection of all hardware with repair of femur fracture at [facility], has been left without a femur head  History of multidrug-resistant organisms  History of opioid and alcohol abuse per records  History of hepatic adenoma resection Dr. Townsend could not find the year or pathology report  Ventral hernia secondary to previous hepatic resection and hysterectomy requiring mesh therapy and multiple surgical revisions including wound VAC  Missing left to left fingers due to frostbite after fall outside with inability to get up  Non-smoker  History of noncompliance  Poor living circumstances lives alone with the mother that occasionally drives her to appointments otherwise lives by herself, daughter in college at [employer]  Full code    Plan:    No objection to transfer out of ICU  Defer workup of chronic nausea and vomiting to GI  Continue [place]    Ronan Hammer MD  Pulmonary and Critical Care   [phone] Perfect Serve  [phone] Cell  
pertinent history and exam findings,  with the author of this note . I have reviewed the key elements of all parts of the encounter with the nurse practitioner/resident.  I agree with the assessment, plan and orders as documented by the above health care provider with the following addendum.     More than 50% of the time was spent taking care of this patient in addition to the nurse practitioner time.  That also included history taking follow-up physical examination and review of system.    Electronically signed by Alfie Wilson MD     
1026   Wound Volume (cm^3) 5.25 cm^3 03/10/25 1026   Wound Healing % -1 03/10/25 1026   Wound Assessment Pink/red;Slough 03/10/25 1026   Drainage Amount Moderate (25-50%) 03/10/25 1026   Drainage Description Serosanguinous 03/10/25 1026   Odor None 03/10/25 1026   Valerie-wound Assessment Blanchable erythema;Induration 03/10/25 1026   Margins Defined edges 03/10/25 1026   Number of days: 22         WOUND ASSESSMENT:   Patient admitted on 3/9 with dizziness and a fall at home. She also had nausea and vomiting and hadn't taken her insulin in a few days. She is known to writer from previous admissions. She has a chronic, non healing left hip surgical wound which is closed today. She has wounds to her abdomen and right medial thigh. She states that these wounds have been present for 3 weeks or so and began as blood blisters. The abdominal wound is red with some slough present. The right medial thigh wound is red with some slough present. There is an erythematous border to both wounds, but more pronounced on the right thigh wound. There is slight induration around the right thigh wound. Both wounds extremely painful to even light touch. Suspect that these wounds could be autoimmune related, possibly pyoderma gangrenosum. Patient also has history of emigdio-en-y gastric bypass and it does not appear that she follows up as outpatient. Suspect that she might have some vitamin/mineral deficiencies as well. Recommend santyl. Order obtained. Patient also has some redness in her groin, order for miconazole powder obtained.     Response to treatment:  Well tolerated by patient., With complaints of pain.       Plan:     Plan of Care:     Abdomen, right medial thigh: Cleanse with saline, pat dry. Apply thick layer of santyl to wounds, cover with nonstick telfa pad, secure with bordered gauze. Change daily and as needed if loose or soiled.     Groin: Cleanse with soap and water, pat dry. Apply miconazole powder twice daily as ordered.

## 2025-03-14 NOTE — CARE COORDINATION
ONGOING DISCHARGE  NOTE:      Writer reviewed notes, Patient is agreeable to discharge to [place] [address]  [facility] [address]  Phone [phone]  Fax [phone]      Discharge is pending pre cert.  Pre cert started on 3/12    Patient will have a EGD done today     Will continue to follow for additional discharge needs.     If patient is discharged prior to next notation, then this note serves as note for discharge by case management.    Electronically signed by Adamaris Villatoro on 3/14/2025 at 12:33 PM

## 2025-03-14 NOTE — ANESTHESIA PRE PROCEDURE
Department of Anesthesiology  Preprocedure Note       Name:  Krista Chiang   Age:  44 y.o.  :  1980                                          MRN:  [id number]         Date:  3/14/2025      Surgeon: Surgeon(s):  Alfie Wilson MD    Procedure: Procedure(s):  ESOPHAGOGASTRODUODENOSCOPY BIOPSY    Medications prior to admission:   Prior to Admission medications    Medication Sig Start Date End Date Taking? Authorizing Provider   ascorbic acid (VITAMIN C) 500 MG tablet Take 1 tablet by mouth daily 25  Yes Fidel Torre MD   bumetanide (BUMEX) 2 MG tablet Take 1 tablet by mouth daily as needed (For swelling) 25  Yes Fidel Torre MD   zinc sulfate (ZINCATE) 220 (50 Zn)  mg capsule - elemental zinc Take 1 capsule by mouth daily 25  Yes Fidel Torre MD   Multiple Vitamins-Minerals (THERAPEUTIC MULTIVITAMIN-MINERALS) tablet Take 1 tablet by mouth daily 25 Yes Fidel Torre MD   ondansetron (ZOFRAN-ODT) 8 MG TBDP disintegrating tablet Take 1 tablet by mouth 24  Yes ProviderArsen MD   sucralfate (CARAFATE) 1 GM tablet Take 1 tablet by mouth 4 times daily 25  Yes Crystal Estrada MD   omeprazole (PRILOSEC) 20 MG delayed release capsule TAKE 1 CAPSULE BY MOUTH TWICE DAILY 24  Yes Brianne Lemus PA-C   fondaparinux (ARIXTRA) 7.5 MG/0.6ML SOLN injection Inject 0.6 mLs into the skin daily 24  Yes Wilner Ash MD   tiZANidine (ZANAFLEX) 4 MG tablet Take 1 tablet by mouth 3 times daily as needed (Back pain) 10/4/24  Yes Alexis Barnes MD   dilTIAZem (CARDIZEM CD) 120 MG extended release capsule Take 1 capsule by mouth daily   Yes Arsen Owens MD   gabapentin (NEURONTIN) 300 MG capsule Take 1 capsule by mouth 3 times daily.   Yes ProviderArsen MD   INSULIN LISPRO IJ Inject as directed Takes 6 units with meals   Yes Arsen Owens MD   LORazepam (ATIVAN) 1 MG tablet Take 0.5 tablets by mouth every 8 hours as needed

## 2025-03-14 NOTE — OP NOTE
was stable with stable vital signs and stable O2 saturations.       Findings:    Retropharyngeal area was grossly normal appearing    Esophagus: Normal upper and middle esophagus  Lower esophagus showed small amount of yellow exudate-biopsy obtained rule out Candida    History of gastric bypass  2 cm clean-based ulcer immediately after the anastomosis without evidence of bleed  Anastomosis was otherwise healthy appearing and I did not notice any stricture, confirmed with empiric balloon dilation with CRE 12-15 with no resistance or disruption  3 inch of gastric pouch.  No food residue in the gastric pouch    The Awa limb examined up to 10 cm with healthy appearing small bowel loops. biopsies obtained to rule out microscopic enteritis and celiac    The scope was removed and the patient tolerated the procedure well.     Recommendations/Plan:   Return to hospital [facility]  Diet as tolerated  F/U Biopsies    Electronically signed by Alfie Wilson MD  on 3/14/2025 at 2:33 PM   This note is created with the assistance of a speech recognition program.  While intending to generate a document that actually reflects the content of the procedure, the document can still have some errors including those of syntax and sound a like substitutions which may escape proofreading.  It such instances, actual meaning can be extrapolated by contextual diversion.

## 2025-03-14 NOTE — ANESTHESIA POSTPROCEDURE EVALUATION
Department of Anesthesiology  Postprocedure Note    Patient: Krista Chiang  MRN: [id number]  YOB: 1980  Date of evaluation: 3/14/2025    Procedure Summary       Date: 03/14/25 Room / Location: [place] [address] / [facility]    Anesthesia Start: 1414 Anesthesia Stop: 1441    Procedure: ESOPHAGOGASTRODUODENOSCOPY BIOPSY WITH BALOON DILATION (Esophagus) Diagnosis:       Nausea and vomiting, unspecified vomiting type      (Nausea and vomiting, unspecified vomiting type [R11.2])    Surgeons: Alfie Wilson MD Responsible Provider: Mariah Alba MD    Anesthesia Type: general ASA Status: 3            Anesthesia Type: No value filed.    Aston Phase I: Aston Score: 10    Aston Phase II:      Anesthesia Post Evaluation    Comments: POST- ANESTHESIA EVALUATION       Pt Name: Krista Chiang  MRN: [id number]  YOB: 1980  Date of evaluation: 3/14/2025  Time:  3:22 PM      BP (!) 116/96   Pulse 71   Temp 98.1 °F (36.7 °C)   Resp 14   Ht 1.727 m (5' 8\")   Wt 93 kg (205 lb 0.4 oz)   SpO2 99%   BMI 31.17 kg/m²      Consciousness Level  Awake  Cardiopulmonary Status  Stable  Pain Adequately Treated YES  Nausea / Vomiting  NO  Adequate Hydration  YES  Anesthesia Related Complications NONE      Electronically signed by Mariah Alba MD on 3/14/2025 at 3:22 PM           No notable events documented.

## 2025-03-15 LAB
ANION GAP SERPL CALCULATED.3IONS-SCNC: 9 MMOL/L (ref 9–16)
BUN SERPL-MCNC: 8 MG/DL (ref 6–20)
CALCIUM SERPL-MCNC: 8 MG/DL (ref 8.6–10.4)
CHLORIDE SERPL-SCNC: 107 MMOL/L (ref 98–107)
CO2 SERPL-SCNC: 23 MMOL/L (ref 20–31)
CREAT SERPL-MCNC: 0.6 MG/DL (ref 0.7–1.2)
ERYTHROCYTE [DISTWIDTH] IN BLOOD BY AUTOMATED COUNT: 19.2 % (ref 11.5–14.9)
GFR, ESTIMATED: >90 ML/MIN/1.73M2
GLUCOSE BLD-MCNC: 118 MG/DL (ref 65–105)
GLUCOSE BLD-MCNC: 145 MG/DL (ref 65–105)
GLUCOSE BLD-MCNC: 188 MG/DL (ref 65–105)
GLUCOSE BLD-MCNC: 193 MG/DL (ref 65–105)
GLUCOSE SERPL-MCNC: 121 MG/DL (ref 74–99)
HCT VFR BLD AUTO: 38.1 % (ref 36–46)
HGB BLD-MCNC: 12.2 G/DL (ref 12–16)
MCH RBC QN AUTO: 28.8 PG (ref 26–34)
MCHC RBC AUTO-ENTMCNC: 32 G/DL (ref 31–37)
MCV RBC AUTO: 89.8 FL (ref 80–100)
PLATELET # BLD AUTO: 219 K/UL (ref 150–450)
PMV BLD AUTO: 10.3 FL (ref 6–12)
POTASSIUM SERPL-SCNC: 4.2 MMOL/L (ref 3.7–5.3)
RBC # BLD AUTO: 4.25 M/UL (ref 4–5.2)
SODIUM SERPL-SCNC: 139 MMOL/L (ref 136–145)
WBC OTHER # BLD: 5.4 K/UL (ref 3.5–11)

## 2025-03-15 PROCEDURE — 6360000002 HC RX W HCPCS: Performed by: STUDENT IN AN ORGANIZED HEALTH CARE EDUCATION/TRAINING PROGRAM

## 2025-03-15 PROCEDURE — 93005 ELECTROCARDIOGRAM TRACING: CPT

## 2025-03-15 PROCEDURE — 85027 COMPLETE CBC AUTOMATED: CPT

## 2025-03-15 PROCEDURE — 82947 ASSAY GLUCOSE BLOOD QUANT: CPT

## 2025-03-15 PROCEDURE — 6370000000 HC RX 637 (ALT 250 FOR IP): Performed by: STUDENT IN AN ORGANIZED HEALTH CARE EDUCATION/TRAINING PROGRAM

## 2025-03-15 PROCEDURE — 99231 SBSQ HOSP IP/OBS SF/LOW 25: CPT | Performed by: INTERNAL MEDICINE

## 2025-03-15 PROCEDURE — 6370000000 HC RX 637 (ALT 250 FOR IP)

## 2025-03-15 PROCEDURE — 36415 COLL VENOUS BLD VENIPUNCTURE: CPT

## 2025-03-15 PROCEDURE — 97116 GAIT TRAINING THERAPY: CPT

## 2025-03-15 PROCEDURE — 97530 THERAPEUTIC ACTIVITIES: CPT

## 2025-03-15 PROCEDURE — 6360000002 HC RX W HCPCS

## 2025-03-15 PROCEDURE — 80048 BASIC METABOLIC PNL TOTAL CA: CPT

## 2025-03-15 PROCEDURE — 6370000000 HC RX 637 (ALT 250 FOR IP): Performed by: NURSE PRACTITIONER

## 2025-03-15 PROCEDURE — 1200000000 HC SEMI PRIVATE

## 2025-03-15 PROCEDURE — 99233 SBSQ HOSP IP/OBS HIGH 50: CPT

## 2025-03-15 RX ORDER — OXYCODONE HYDROCHLORIDE 5 MG/1
2.5 TABLET ORAL ONCE
Refills: 0 | Status: COMPLETED | OUTPATIENT
Start: 2025-03-15 | End: 2025-03-15

## 2025-03-15 RX ORDER — PROCHLORPERAZINE MALEATE 10 MG
5 TABLET ORAL EVERY 8 HOURS PRN
Status: DISCONTINUED | OUTPATIENT
Start: 2025-03-15 | End: 2025-03-16 | Stop reason: HOSPADM

## 2025-03-15 RX ORDER — ONDANSETRON 2 MG/ML
2 INJECTION INTRAMUSCULAR; INTRAVENOUS EVERY 6 HOURS PRN
Status: DISCONTINUED | OUTPATIENT
Start: 2025-03-15 | End: 2025-03-16 | Stop reason: HOSPADM

## 2025-03-15 RX ORDER — LACTOBACILLUS RHAMNOSUS GG 10B CELL
1 CAPSULE ORAL 2 TIMES DAILY WITH MEALS
Status: DISCONTINUED | OUTPATIENT
Start: 2025-03-15 | End: 2025-03-16 | Stop reason: HOSPADM

## 2025-03-15 RX ADMIN — FLECAINIDE ACETATE 100 MG: 100 TABLET ORAL at 20:27

## 2025-03-15 RX ADMIN — PANTOPRAZOLE SODIUM 40 MG: 40 TABLET, DELAYED RELEASE ORAL at 06:03

## 2025-03-15 RX ADMIN — INSULIN GLARGINE 15 UNITS: 100 INJECTION, SOLUTION SUBCUTANEOUS at 08:32

## 2025-03-15 RX ADMIN — SUCRALFATE 1 G: 1 TABLET ORAL at 08:32

## 2025-03-15 RX ADMIN — ONDANSETRON 2 MG: 2 INJECTION, SOLUTION INTRAMUSCULAR; INTRAVENOUS at 12:04

## 2025-03-15 RX ADMIN — OXYCODONE HYDROCHLORIDE 2.5 MG: 5 TABLET ORAL at 20:00

## 2025-03-15 RX ADMIN — FLUOXETINE HYDROCHLORIDE 40 MG: 20 CAPSULE ORAL at 08:32

## 2025-03-15 RX ADMIN — SUCRALFATE 1 G: 1 TABLET ORAL at 15:16

## 2025-03-15 RX ADMIN — MICONAZOLE NITRATE: 20 POWDER TOPICAL at 09:00

## 2025-03-15 RX ADMIN — PROCHLORPERAZINE MALEATE 5 MG: 10 TABLET ORAL at 20:27

## 2025-03-15 RX ADMIN — OXYCODONE HYDROCHLORIDE 2.5 MG: 5 TABLET ORAL at 12:05

## 2025-03-15 RX ADMIN — BUPROPION HYDROCHLORIDE 150 MG: 150 TABLET, EXTENDED RELEASE ORAL at 08:32

## 2025-03-15 RX ADMIN — METOPROLOL TARTRATE 50 MG: 50 TABLET, FILM COATED ORAL at 20:27

## 2025-03-15 RX ADMIN — INSULIN LISPRO 2 UNITS: 100 INJECTION, SOLUTION INTRAVENOUS; SUBCUTANEOUS at 17:13

## 2025-03-15 RX ADMIN — INSULIN LISPRO 2 UNITS: 100 INJECTION, SOLUTION INTRAVENOUS; SUBCUTANEOUS at 20:27

## 2025-03-15 RX ADMIN — Medication 1 CAPSULE: at 17:13

## 2025-03-15 RX ADMIN — FLECAINIDE ACETATE 100 MG: 100 TABLET ORAL at 08:34

## 2025-03-15 RX ADMIN — PANTOPRAZOLE SODIUM 40 MG: 40 TABLET, DELAYED RELEASE ORAL at 15:16

## 2025-03-15 RX ADMIN — OXYCODONE HYDROCHLORIDE 2.5 MG: 5 TABLET ORAL at 04:01

## 2025-03-15 RX ADMIN — OXYCODONE HYDROCHLORIDE AND ACETAMINOPHEN 1 TABLET: 5; 325 TABLET ORAL at 20:00

## 2025-03-15 RX ADMIN — OXYCODONE HYDROCHLORIDE AND ACETAMINOPHEN 1 TABLET: 5; 325 TABLET ORAL at 04:00

## 2025-03-15 RX ADMIN — OXYCODONE HYDROCHLORIDE 2.5 MG: 5 TABLET ORAL at 15:38

## 2025-03-15 RX ADMIN — MICONAZOLE NITRATE: 20 POWDER TOPICAL at 20:30

## 2025-03-15 RX ADMIN — METOPROLOL TARTRATE 50 MG: 50 TABLET, FILM COATED ORAL at 08:33

## 2025-03-15 RX ADMIN — ONDANSETRON 2 MG: 2 INJECTION, SOLUTION INTRAMUSCULAR; INTRAVENOUS at 18:06

## 2025-03-15 RX ADMIN — OXYCODONE HYDROCHLORIDE 2.5 MG: 5 TABLET ORAL at 00:00

## 2025-03-15 RX ADMIN — COLLAGENASE SANTYL: 250 OINTMENT TOPICAL at 09:00

## 2025-03-15 RX ADMIN — SUCRALFATE 1 G: 1 TABLET ORAL at 20:27

## 2025-03-15 RX ADMIN — PRAVASTATIN SODIUM 40 MG: 40 TABLET ORAL at 20:27

## 2025-03-15 RX ADMIN — FONDAPARINUX SODIUM 7.5 MG: 7.5 INJECTION, SOLUTION SUBCUTANEOUS at 10:26

## 2025-03-15 RX ADMIN — SUCRALFATE 1 G: 1 TABLET ORAL at 17:13

## 2025-03-15 RX ADMIN — CLOBETASOL PROPIONATE: 0.5 OINTMENT TOPICAL at 08:33

## 2025-03-15 RX ADMIN — OXYCODONE HYDROCHLORIDE AND ACETAMINOPHEN 1 TABLET: 5; 325 TABLET ORAL at 11:59

## 2025-03-15 RX ADMIN — PROCHLORPERAZINE MALEATE 5 MG: 10 TABLET ORAL at 09:00

## 2025-03-15 ASSESSMENT — PAIN DESCRIPTION - DESCRIPTORS
DESCRIPTORS: ACHING;DISCOMFORT;STABBING;SHOOTING
DESCRIPTORS: ACHING;DISCOMFORT;SHARP

## 2025-03-15 ASSESSMENT — PAIN SCALES - GENERAL
PAINLEVEL_OUTOF10: 7
PAINLEVEL_OUTOF10: 3
PAINLEVEL_OUTOF10: 6
PAINLEVEL_OUTOF10: 7
PAINLEVEL_OUTOF10: 8

## 2025-03-15 ASSESSMENT — PAIN DESCRIPTION - LOCATION
LOCATION: HIP
LOCATION: HIP
LOCATION: LEG

## 2025-03-15 ASSESSMENT — PAIN DESCRIPTION - ORIENTATION
ORIENTATION: LEFT
ORIENTATION: LEFT

## 2025-03-15 NOTE — CARE COORDINATION
DISCHARGE PLANNING NOTE:    Writer received call from pt mother to discuss placement options versus return to home. All questions answered at this time.  Electronically signed by RIOS Mccartney on 3/15/2025 at 12:07 PM

## 2025-03-15 NOTE — CARE COORDINATION
DISCHARGE PLANNING NOTE:    Writer was informed by bedside RN Sunita that pt would like to return home at this time. Writer met with pt for discussion. Pt states [facility] had taken her social security during prior admission and she had trouble being released from facility prior.     Pt states she would like to discuss return to home with her mother before she makes a decision to return home.    Pt is current with [facility].     Electronically signed by RIOS Mccartney on 3/15/2025 at 10:53 AM

## 2025-03-15 NOTE — CARE COORDINATION
DISCHARGE PLANNING NOTE:    Writer is following for potential discharge to [facility]. Call received from Helene with [facility] for update on pt status and not being appropriate for ARU. Authorization is still pending for SNF. Updated therapy notation needed. Perfectserve message placed to Gosia with PT to request pt to be seen for treatment.    Electronically signed by RIOS Mccartney on 3/15/2025 at 9:07 AM     Occupational Therapy Discharge    Visit Type: Discharge Summary  Visit: 12  Referring Provider: Gareth Saez MD  Medical Diagnosis (from order): Diagnosis Information    Diagnosis  386.9 (ICD-9-CM) - H81.90 (ICD-10-CM) - Vestibular dizziness  386.11 (ICD-9-CM) - H81.10 (ICD-10-CM) - Benign paroxysmal positional vertigo, unspecified laterality  R27.9 (ICD-10-CM) - Unspecified lack of coordination       Patient alert and oriented X3.    SUBJECTIVE                                                                                                               Pt reports having received a letter from work indicating her return; pt verbally frustrated re: inability to complete work tasks and decr adherence for \"restricted\" or \"light\" duty restrictions.       OBJECTIVE                                                                                                                      Range of Motion (ROM)   (degrees unless noted; active unless noted; norms in ( ); negative=lacking to 0, positive=beyond 0)  WFL: LUE, RUE    Strength  (out of 5 unless noted, standard test position unless noted)   Shoulder:     - Flexion:         • Left: 4+, 5         • Right: 4+, 5     - Extension:         • Left: 4        • Right: 4    - Abduction:        • Left: 4        • Right: 4+    - Adduction:         • Left: 5        • Right: 5  Elbow/Forearm:    - Flexion:        • Left: 4+, 5        • Right: 4+, 5     - Extension:        • Left: 4        • Right: 4   : (lbs)    - Neutral:        • Left: Trial(s): 25, 25, 30, Average: 26.67        • Right: Trial(s): 26, 27, 30, Average: 27.67     norms: 45-49 years, male: left .6, right 86.9-132.9; female: left 43.3-68.7, right 47.1-77.3  Pinch: (lbs)    - Lateral:         • Left: Trial(s): 8.5, 9, 8.75, Average: 8.75        • Right: Trial(s): 10.5, 10.5, 10.75, Average: 10.58    Lateral pinch norms: 45-49 years, male: left 20.4-29.2, right 21.9-29.7; female: left 13.7-19.5, right  14.4-20.8  Comments / Details: Pinch fay unavailable for re-assessment.          Coordination   - Unable to complete fine motor tasks: opening twist top, manipulating coins and picking up pills  9 Hole Peg Test (sec)      • Left: 33     • Right: 33     Norms: Age: 45-49 male left 20.4 +/-2.9, right 18.8 +/-2.3,  female left 18.4 +/-1.9, right 17.3 +/-2.0  Incr symptoms (nausea, dizziness) looking down during 9 HPT            Outcome/Assessments  Neuro QOL at discharge - filled out by patient  37/40     coins and writing on paper - varies about heights and levels at which task is completed.   Pt reports same as eval with exception/addition of shampooing hair d/t incr amount of hair versus shoulder weakness.      Treatment     Skilled input: verbal instruction/cues, tactile instruction/cues and facilitation    Writer verbally educated and received verbal consent for hand placement, positioning of patient, and techniques to be performed today from patient for therapist position for techniques as described above and how they are pertinent to the patient's plan of care.  Home Exercise Program  Reviewed gross motor and [facility] HEP as well as strengthening programs.  Instructed patient to complete at least one (coordination versus strengthening) 1x/day - verbally agreeable.      ASSESSMENT                                                                                                          To date the patient has made gains not as expected due to Persistent symptoms of dizziness and nausea.    Pt partially met LTG however noted with overall plateau versus minimal improvements in  BUE gross and fine motor coordination, hand dexterity, B shoulder / UE proximal strength). Throughout OT process, provided and reviewed HEP, energy conservation and work simplification strategies. Attempted simulation of patient reported work tasks, however pt was unable to successfully and independently complete. As patient indicates her  employer indicating return to work and patient discussing reservations/concerns, discussed potential referral to [facility] versus work hardening. Unfortunately d/t questionable source of symptoms patient with difficulty resolving; educated on red lenses for reduced light input with intermittent implementation during tx.  Pain/symptoms after session (out of 10): 0  Education:   - Results of above outlined education: Verbalizes understanding    PLAN                                                                                                                           Discharge from skilled therapy with instructions/recommendations to: continue home exercise program, follow up with referring provider    Suggestions for next session as indicated: Progress per plan of care      Goals  Long Term Goals: to be met by end of plan of care   1. Pt will maintain dyn standing balance x15 minutes during table top activity to progress work simulated activity.Status: partially met Unable to maintain \"dynamic\" component, requiring static standing breaks versus UE support versus seated to resolve issues.  2. Pt will improve B shoulder MMT strength by .5-1 grade to improve muscular endurance and IADL performance (e.g. reaching into kitchen cupboards, fridge)Status: met   3. Pt will incr B  strength by 5-8 lb to improve overall indep and efficacy in LB ADL Status: not metPt with decr  strength since evaluation      4. Pt will B pinch strength by 3-5 lb to improve indep and ease of fasteners and opening containers/bottles.Status: not metUnable to re-assess d/t fay broken earlier in dc date.  5.  Pt will be indep with HEP for therapy exercises provided.Status: met       Therapy procedure time and total treatment time can be found documented on the Time Entry flowsheet

## 2025-03-15 NOTE — CARE COORDINATION
DISCHARGE PLANNING NOTE:    Writer met with pt for follow up on placement decision. Pt states she wishes to return home with [facility] at this time.    Writer placed perfectserve message to Dr. Mendoza to confirm pt decision. Writer informed bedside RN Sunita of pt decision.   Electronically signed by RIOS Mccartney on 3/15/2025 at 3:37 PM

## 2025-03-16 VITALS
RESPIRATION RATE: 18 BRPM | WEIGHT: 205.03 LBS | OXYGEN SATURATION: 100 % | HEART RATE: 55 BPM | TEMPERATURE: 98.2 F | BODY MASS INDEX: 31.07 KG/M2 | SYSTOLIC BLOOD PRESSURE: 123 MMHG | HEIGHT: 68 IN | DIASTOLIC BLOOD PRESSURE: 87 MMHG

## 2025-03-16 LAB
GLUCOSE BLD-MCNC: 138 MG/DL (ref 65–105)
GLUCOSE BLD-MCNC: 146 MG/DL (ref 65–105)

## 2025-03-16 PROCEDURE — 6370000000 HC RX 637 (ALT 250 FOR IP): Performed by: STUDENT IN AN ORGANIZED HEALTH CARE EDUCATION/TRAINING PROGRAM

## 2025-03-16 PROCEDURE — 99231 SBSQ HOSP IP/OBS SF/LOW 25: CPT | Performed by: INTERNAL MEDICINE

## 2025-03-16 PROCEDURE — 6360000002 HC RX W HCPCS

## 2025-03-16 PROCEDURE — 6370000000 HC RX 637 (ALT 250 FOR IP)

## 2025-03-16 PROCEDURE — 36415 COLL VENOUS BLD VENIPUNCTURE: CPT

## 2025-03-16 PROCEDURE — 82947 ASSAY GLUCOSE BLOOD QUANT: CPT

## 2025-03-16 PROCEDURE — 6360000002 HC RX W HCPCS: Performed by: STUDENT IN AN ORGANIZED HEALTH CARE EDUCATION/TRAINING PROGRAM

## 2025-03-16 PROCEDURE — 99239 HOSP IP/OBS DSCHRG MGMT >30: CPT

## 2025-03-16 PROCEDURE — 6360000002 HC RX W HCPCS: Performed by: NURSE PRACTITIONER

## 2025-03-16 RX ORDER — FLECAINIDE ACETATE 100 MG/1
100 TABLET ORAL 2 TIMES DAILY
Qty: 60 TABLET | Refills: 0 | Status: SHIPPED | OUTPATIENT
Start: 2025-03-16

## 2025-03-16 RX ORDER — PANTOPRAZOLE SODIUM 40 MG/1
40 TABLET, DELAYED RELEASE ORAL
Qty: 60 TABLET | Refills: 0 | Status: SHIPPED | OUTPATIENT
Start: 2025-03-16 | End: 2025-04-15

## 2025-03-16 RX ORDER — OXYCODONE AND ACETAMINOPHEN 5; 325 MG/1; MG/1
1 TABLET ORAL EVERY 8 HOURS PRN
Qty: 6 TABLET | Refills: 0 | Status: SHIPPED | OUTPATIENT
Start: 2025-03-16 | End: 2025-03-18

## 2025-03-16 RX ORDER — FENTANYL CITRATE 0.05 MG/ML
50 INJECTION, SOLUTION INTRAMUSCULAR; INTRAVENOUS ONCE
Status: COMPLETED | OUTPATIENT
Start: 2025-03-16 | End: 2025-03-16

## 2025-03-16 RX ORDER — METOPROLOL TARTRATE 50 MG
50 TABLET ORAL 2 TIMES DAILY
Qty: 60 TABLET | Refills: 0 | Status: SHIPPED | OUTPATIENT
Start: 2025-03-16

## 2025-03-16 RX ORDER — LACTOBACILLUS RHAMNOSUS GG 10B CELL
1 CAPSULE ORAL 2 TIMES DAILY WITH MEALS
Qty: 30 CAPSULE | Refills: 0 | Status: SHIPPED | OUTPATIENT
Start: 2025-03-16 | End: 2025-03-31

## 2025-03-16 RX ORDER — SUCRALFATE ORAL 1 G/10ML
1 SUSPENSION ORAL 4 TIMES DAILY
Qty: 1200 ML | Refills: 1 | Status: SHIPPED | OUTPATIENT
Start: 2025-03-16

## 2025-03-16 RX ADMIN — MICONAZOLE NITRATE: 20 POWDER TOPICAL at 07:54

## 2025-03-16 RX ADMIN — INSULIN GLARGINE 15 UNITS: 100 INJECTION, SOLUTION SUBCUTANEOUS at 07:53

## 2025-03-16 RX ADMIN — Medication 1 CAPSULE: at 07:54

## 2025-03-16 RX ADMIN — OXYCODONE HYDROCHLORIDE AND ACETAMINOPHEN 1 TABLET: 5; 325 TABLET ORAL at 04:06

## 2025-03-16 RX ADMIN — FONDAPARINUX SODIUM 7.5 MG: 7.5 INJECTION, SOLUTION SUBCUTANEOUS at 07:56

## 2025-03-16 RX ADMIN — OXYCODONE HYDROCHLORIDE 2.5 MG: 5 TABLET ORAL at 12:09

## 2025-03-16 RX ADMIN — PANTOPRAZOLE SODIUM 40 MG: 40 TABLET, DELAYED RELEASE ORAL at 06:07

## 2025-03-16 RX ADMIN — CHOLESTYRAMINE 4 G: 4 POWDER, FOR SUSPENSION ORAL at 07:52

## 2025-03-16 RX ADMIN — SUCRALFATE 1 G: 1 TABLET ORAL at 07:54

## 2025-03-16 RX ADMIN — CLOBETASOL PROPIONATE: 0.5 OINTMENT TOPICAL at 07:55

## 2025-03-16 RX ADMIN — SUCRALFATE 1 G: 1 TABLET ORAL at 12:09

## 2025-03-16 RX ADMIN — BUPROPION HYDROCHLORIDE 150 MG: 150 TABLET, EXTENDED RELEASE ORAL at 07:54

## 2025-03-16 RX ADMIN — ONDANSETRON 2 MG: 2 INJECTION, SOLUTION INTRAMUSCULAR; INTRAVENOUS at 07:59

## 2025-03-16 RX ADMIN — FLECAINIDE ACETATE 100 MG: 100 TABLET ORAL at 08:36

## 2025-03-16 RX ADMIN — OXYCODONE HYDROCHLORIDE 2.5 MG: 5 TABLET ORAL at 04:06

## 2025-03-16 RX ADMIN — METOPROLOL TARTRATE 50 MG: 50 TABLET, FILM COATED ORAL at 07:54

## 2025-03-16 RX ADMIN — FLUOXETINE HYDROCHLORIDE 40 MG: 20 CAPSULE ORAL at 07:54

## 2025-03-16 RX ADMIN — OXYCODONE HYDROCHLORIDE AND ACETAMINOPHEN 1 TABLET: 5; 325 TABLET ORAL at 12:09

## 2025-03-16 RX ADMIN — FENTANYL CITRATE 50 MCG: 0.05 INJECTION, SOLUTION INTRAMUSCULAR; INTRAVENOUS at 02:45

## 2025-03-16 ASSESSMENT — PAIN DESCRIPTION - LOCATION
LOCATION: HIP
LOCATION: HIP

## 2025-03-16 ASSESSMENT — PAIN SCALES - GENERAL: PAINLEVEL_OUTOF10: 6

## 2025-03-16 NOTE — PROGRESS NOTES
Cardiology Progress Note                       Date:   3/13/2025  Patient name: Krista Chiang  Date of admission:  3/9/2025 10:39 PM  MRN:   [id number]  YOB: 1980  PCP: Arielle Melton APRN - NP    Reason for Admission:  DKA    Subjective:       Patient's rhythm is now stable with no further episodes of tachycardia or arrhythmias, heart rate in 60s, resting comfortably this morning, denies any chest pain or dyspnea. Still reports nausea and feeling of fulness after minimal po intake.     Scheduled Meds:   insulin glargine  15 Units SubCUTAneous Daily    dilTIAZem  10 mg IntraVENous Once    adenosine  12 mg IntraVENous Once    metoprolol tartrate  50 mg Oral BID    flecainide  100 mg Oral BID    buPROPion  150 mg Oral QAM    [Held by provider] cholestyramine light  1 packet Oral Daily    [Held by provider] dicyclomine  10 mg Oral 4x Daily AC & HS    FLUoxetine  40 mg Oral Daily    fondaparinux  7.5 mg SubCUTAneous Daily    gabapentin  300 mg Oral TID    pravastatin  40 mg Oral Nightly    sucralfate  1 g Oral 4x Daily    insulin lispro  0-8 Units SubCUTAneous 4x Daily AC & HS    pantoprazole  40 mg Oral BID AC    collagenase   Topical Daily    miconazole   Topical BID       Continuous Infusions:   dextrose      phenylephrine (MIR-SYNEPHRINE) 50 mg in sodium chloride 0.9 % 250 mL infusion Stopped (03/11/25 0546)       Labs:     CBC:   No results for input(s): \"WBC\", \"HGB\", \"PLT\" in the last 72 hours.    BMP:    Recent Labs     03/11/25  0500 03/12/25  0613 03/13/25  0627    138 136   K 4.3 4.8 4.4   * 106 107   CO2 17* 21 23   BUN 6 7 7   CREATININE 0.6* 0.6* 0.5*   GLUCOSE 140* 333* 305*     Hepatic:   Recent Labs     03/11/25  0500   AST 42*   ALT 39*   BILITOT <0.2   ALKPHOS 174*     Troponin: No results for input(s): \"TROPONINI\" in the last 72 hours.  BNP: No results for input(s): \"BNP\" in the last 72 hours.  Lipids: No results for input(s): \"CHOL\", \"HDL\" in the last 
    [facility]  Basil Servin MD; Gigi Morales MD; Raul Heard MD; MD Madeline Jacobson MD; Wilner Ash MD  [facility]   IN-PATIENT SERVICE  [facility]                 Date:   3/10/2025  Patientname:  Krista Chiang  Date of admission:  3/9/2025 10:39 PM  MRN:   [id number]  Account:  [id number]  YOB: 1980  PCP:    Arielle Melton APRN - NP  Room:   06/06  Code Status:    Full      Chief Complaint:     Chief Complaint   Patient presents with    Fall    Dizziness     Fall at home while using  rest room . Was feeling dizzy and fell on left side . Patient use walker at home for ambulation     Nausea    Leg Pain       History of Present Illness:     Krista Chiang is a 44 y.o. Non- / non  female with a history of alcohol abuse, type 2 diabetes mellitus, hip arthroplasty with resultant infection and hardware removal, Painting's esophagus, bipolar disorder, and PE accompanied by DVT, who presents with Fall, Dizziness (Fall at home while using  rest room . Was feeling dizzy and fell on left side . Patient use walker at home for ambulation ), Nausea, and Leg Pain   and is admitted to the hospital for the management of DKA, type 1, not at goal (HCC).    According to patient, she became dizzy and fell.  She states that she has been nauseous and vomiting for 3 days, and has not taken her insulin in the same 3 days.  She states that she got up from using the restroom ambulated to her chair sat down and suffered a syncopal event.  She has a recurrent history of left hip pain with a rupture of an operation wound.  She also has an open wound over her left hip her abdominal skin fold, and her right inner thigh.    Chart review reveals that the patient was recently discharged from this facility after management of UTI and chronic wounds.  Urine culture grew E. coli sensitive only to meropenem.  General surgery was 
    [facility]   IN-PATIENT SERVICE   [facility]    Progress note            Date:   3/11/2025  Patient name:  Krista Chiang  Date of admission:  3/9/2025 10:39 PM  MRN:   [id number]  Account:  [id number]  YOB: 1980  PCP:    Arielle Melton APRN - NP  Room:   2006/2006-01  Code Status:    Full Code    Chief Complaint:     Chief Complaint   Patient presents with    Fall    Dizziness     Fall at home while using  rest room . Was feeling dizzy and fell on left side . Patient use walker at home for ambulation     Nausea    Leg Pain       History Obtained From:     patient    History of Present Illness:     The patient is a 44 y.o.  Non- / non  female who presents with Fall, Dizziness (Fall at home while using  rest room . Was feeling dizzy and fell on left side . Patient use walker at home for ambulation ), Nausea, and Leg Pain   and she is admitted to the hospital for the management of    Krista Chiang is a 44 y.o. Non- / non  female with a history of alcohol abuse, type 2 diabetes mellitus, hip arthroplasty with resultant infection and hardware removal, Painting's esophagus, bipolar disorder, and PE accompanied by DVT, who presents with Fall, Dizziness (Fall at home while using  rest room . Was feeling dizzy and fell on left side . Patient use walker at home for ambulation ), Nausea, and Leg Pain   and is admitted to the hospital for the management of DKA, type 1, not at goal (HCC).     According to patient, she became dizzy and fell.  She states that she has been nauseous and vomiting for 3 days, and has not taken her insulin in the same 3 days.  She states that she got up from using the restroom ambulated to her chair sat down and suffered a syncopal event.  She has a recurrent history of left hip pain with a rupture of an operation wound.  She also has an open wound over her left hip her abdominal skin fold, and her right inner thigh.   
    [facility]   IN-PATIENT SERVICE   [facility]    Progress note            Date:   3/15/2025  Patient name:  Krista Chiang  Date of admission:  3/9/2025 10:39 PM  MRN:   [id number]  Account:  [id number]  YOB: 1980  PCP:    Arielle Melton APRN - NP  Room:   2076/2076-01  Code Status:    Full Code    Chief Complaint:     Chief Complaint   Patient presents with    Fall    Dizziness     Fall at home while using  rest room . Was feeling dizzy and fell on left side . Patient use walker at home for ambulation     Nausea    Leg Pain       History Obtained From:     patient    History of Present Illness:     The patient is a 44 y.o.  Non- / non  female who presents with Fall, Dizziness (Fall at home while using  rest room . Was feeling dizzy and fell on left side . Patient use walker at home for ambulation ), Nausea, and Leg Pain   and she is admitted to the hospital for the management of    Krista Chiang is a 44 y.o. Non- / non  female with a history of alcohol abuse, type 2 diabetes mellitus, hip arthroplasty with resultant infection and hardware removal, Painting's esophagus, bipolar disorder, and PE accompanied by DVT, who presents with Fall, Dizziness (Fall at home while using  rest room . Was feeling dizzy and fell on left side . Patient use walker at home for ambulation ), Nausea, and Leg Pain   and is admitted to the hospital for the management of DKA, type 1, not at goal (HCC).     According to patient, she became dizzy and fell.  She states that she has been nauseous and vomiting for 3 days, and has not taken her insulin in the same 3 days.  She states that she got up from using the restroom ambulated to her chair sat down and suffered a syncopal event.  She has a recurrent history of left hip pain with a rupture of an operation wound.  She also has an open wound over her left hip her abdominal skin fold, and her right inner thigh.   
    [facility]    Gastroenterology Daily Progress Note      Patient:   Krista Chiang   :    1980   Facility:   [facility]  Date:     3/11/2025  Consultant:   BERNARDA Ryan CNP, CNP      SUBJECTIVE  44 y.o. female admitted 3/9/2025 with Dizziness [R42]  DKA, type 1, not at goal (HCC) [E10.10]  Diabetic ketoacidosis without coma associated with type 2 diabetes mellitus (HCC) [E11.10] and seen for diarrhea. The pt was seen and examined. Had two bm overnight none this am. Stool cultures and pancreatic elastase pending. Tolerating food this am, c/o hip pain. Was transferred to the ICU last night for SVT.        OBJECTIVE  Scheduled Meds:   dilTIAZem  10 mg IntraVENous Once    adenosine  12 mg IntraVENous Once    metoprolol tartrate  12.5 mg Oral BID    buPROPion  150 mg Oral QAM    cholestyramine light  1 packet Oral Daily    [Held by provider] dicyclomine  10 mg Oral 4x Daily AC & HS    FLUoxetine  40 mg Oral Daily    fondaparinux  7.5 mg SubCUTAneous Daily    gabapentin  300 mg Oral TID    pravastatin  40 mg Oral Nightly    sucralfate  1 g Oral 4x Daily    insulin lispro  0-8 Units SubCUTAneous 4x Daily AC & HS    insulin glargine  10 Units SubCUTAneous Daily    pantoprazole  40 mg Oral BID AC    collagenase   Topical Daily    miconazole   Topical BID       Vital Signs:  BP (!) 116/53   Pulse (!) 103   Temp 98.1 °F (36.7 °C) (Oral)   Resp 17   Ht 1.727 m (5' 8\")   Wt 94.9 kg (209 lb 3.5 oz)   SpO2 100%   BMI 31.81 kg/m²    Review of Systems - History obtained from chart review and the patient  General ROS: negative  Respiratory ROS: no cough, shortness of breath, or wheezing  Cardiovascular ROS: no chest pain or dyspnea on exertion  Gastrointestinal ROS: positive for - loose stool    Physical Exam:     General Appearance: ill appearing alert and oriented to person, place and time, well-developed and well-nourished, in no acute distress  Skin: warm and dry, no rash or 
    [facility]   IN-PATIENT SERVICE   [facility]    Progress note            Date:   3/16/2025  Patient name:  Krista Chiang  Date of admission:  3/9/2025 10:39 PM  MRN:   [id number]  Account:  [id number]  YOB: 1980  PCP:    Arielle Melton APRN - NP  Room:   2076/2076-01  Code Status:    Full Code    Chief Complaint:     Chief Complaint   Patient presents with    Fall    Dizziness     Fall at home while using  rest room . Was feeling dizzy and fell on left side . Patient use walker at home for ambulation     Nausea    Leg Pain       History Obtained From:     patient    History of Present Illness:     The patient is a 44 y.o.  Non- / non  female who presents with Fall, Dizziness (Fall at home while using  rest room . Was feeling dizzy and fell on left side . Patient use walker at home for ambulation ), Nausea, and Leg Pain   and she is admitted to the hospital for the management of    Krista Chiang is a 44 y.o. Non- / non  female with a history of alcohol abuse, type 2 diabetes mellitus, hip arthroplasty with resultant infection and hardware removal, Painting's esophagus, bipolar disorder, and PE accompanied by DVT, who presents with Fall, Dizziness (Fall at home while using  rest room . Was feeling dizzy and fell on left side . Patient use walker at home for ambulation ), Nausea, and Leg Pain   and is admitted to the hospital for the management of DKA, type 1, not at goal (HCC).     According to patient, she became dizzy and fell.  She states that she has been nauseous and vomiting for 3 days, and has not taken her insulin in the same 3 days.  She states that she got up from using the restroom ambulated to her chair sat down and suffered a syncopal event.  She has a recurrent history of left hip pain with a rupture of an operation wound.  She also has an open wound over her left hip her abdominal skin fold, and her right inner thigh.   
    [facility]   IN-PATIENT SERVICE   [facility]    Progress note            Date:   3/14/2025  Patient name:  Krista Chiang  Date of admission:  3/9/2025 10:39 PM  MRN:   [id number]  Account:  [id number]  YOB: 1980  PCP:    Arielle Melton APRN - NP  Room:   2076/2076-01  Code Status:    Full Code    Chief Complaint:     Chief Complaint   Patient presents with    Fall    Dizziness     Fall at home while using  rest room . Was feeling dizzy and fell on left side . Patient use walker at home for ambulation     Nausea    Leg Pain       History Obtained From:     patient    History of Present Illness:     The patient is a 44 y.o.  Non- / non  female who presents with Fall, Dizziness (Fall at home while using  rest room . Was feeling dizzy and fell on left side . Patient use walker at home for ambulation ), Nausea, and Leg Pain   and she is admitted to the hospital for the management of    Krista Chiang is a 44 y.o. Non- / non  female with a history of alcohol abuse, type 2 diabetes mellitus, hip arthroplasty with resultant infection and hardware removal, Painting's esophagus, bipolar disorder, and PE accompanied by DVT, who presents with Fall, Dizziness (Fall at home while using  rest room . Was feeling dizzy and fell on left side . Patient use walker at home for ambulation ), Nausea, and Leg Pain   and is admitted to the hospital for the management of DKA, type 1, not at goal (HCC).     According to patient, she became dizzy and fell.  She states that she has been nauseous and vomiting for 3 days, and has not taken her insulin in the same 3 days.  She states that she got up from using the restroom ambulated to her chair sat down and suffered a syncopal event.  She has a recurrent history of left hip pain with a rupture of an operation wound.  She also has an open wound over her left hip her abdominal skin fold, and her right inner thigh.   
   GI Progress notes    3/16/2025   12:54 PM    Name:  Krista Chiang  MRN:    [id number]     Acct:     [id number]   Room:  2076/2076-01   Day: 6     Admit Date: 3/9/2025 10:39 PM  PCP: Arielle Melton APRN - NP    Subjective:     C/C:   Chief Complaint   Patient presents with    Fall    Dizziness     Fall at home while using  rest room . Was feeling dizzy and fell on left side . Patient use walker at home for ambulation     Nausea    Leg Pain       Interval History: Status: not changed.     Overall doing better  Still complains of abdominal pain  Denies bleeding or melena  Has been on narcotic pain medications for her back  On PPI and Carafate  Mild nausea no vomiting  Anxiety issues    ROS:  Constitutional: negative for chills, fevers and sweats  Respiratory: Positive cough, dyspnea on exertion, hemoptysis, shortness of breath and wheezing  Cardiovascular: negative for chest pain, chest pressure/discomfort, dyspnea, lower extremity edema and palpitations  Gastrointestinal: The abdominal pain, constipation, diarrhea, nausea and vomiting  Neurological: negative for dizziness and positive headaches    Medications:     Allergies:   Allergies   Allergen Reactions    Aripiprazole Itching and Swelling     Other reaction(s): Other  Abilify    Aspirin      Patient is on chronic anticoagulation  Patient is on chronic anticoagulation  Patient is on chronic anticoagulation    Betadine [Povidone Iodine] Rash    Celebrex [Celecoxib] Itching    Celexa [Citalopram Hydrobromide]     Citalopram Itching, Rash and Swelling     Other reaction(s): Unknown    Furosemide      Tolerates Bumex  Other reaction(s): Unknown    Nitrofurantoin      Other reaction(s): Unknown    Tricyclic Antidepressants     Codeine Rash and Hives     Other reaction(s): Unknown    Lithium Nausea And Vomiting     Other reaction(s): Unknown    Paroxetine Rash and Hives     Other reaction(s): Unknown  Paxil    Paxil [Paroxetine Hcl] Rash    Povidone Iodine 
  Physician Progress Note      PATIENT:               CHRISTOPHER THURMAN  CSN #:                  [id number]  :                       1980  ADMIT DATE:       3/9/2025 10:39 PM  DISCH DATE:  RESPONDING  PROVIDER #:        Crystal Estrada MD          QUERY TEXT:    Pt admitted with type 1 DM with DKA and has shock documented as likely due to   underlying arrhythmia.  If possible, please document in progress notes and   discharge summary further specificity regarding the type of shock:    The medical record reflects the following:  Risk Factors: SVT  Clinical Indicators: presented with type 1 DM with DKA, noted to have shock   documented as likely due to underlying arrhythmia; BP at lowest 84/34 with MAP   41 and ; progress notes state SVT that converted to NSR after 1 dose of   adenosine  Treatment: Labs, imaging, ICU monitoring, Phenylephrine, 2,000mL IVF bolus,   Adenosine  Options provided:  -- Cardiogenic Shock  -- Other - I will add my own diagnosis  -- Disagree - Not applicable / Not valid  -- Disagree - Clinically unable to determine / Unknown  -- Refer to Clinical Documentation Reviewer    PROVIDER RESPONSE TEXT:    This patient is in cardiogenic shock.    Query created by: Pearl Elkins on 3/11/2025 5:15 PM      Electronically signed by:  Crystal Estrada MD 3/13/2025 4:17 PM          
[facility]   OCCUPATIONAL THERAPY MISSED TREATMENT NOTE   INPATIENT   Date: 3/11/25  Patient Name: Krista Chiang       Room:   MRN: [id number]   Account #: [id number]    : 1980  (44 y.o.)  Gender: female   Referring Practitioner: Cassy Lafleur APRN - NP  Diagnosis: DKA type 1             REASON FOR MISSED TREATMENT:   -    Refusal by Patient Pt declining therapy at this time, reports she has be crying for her pain meds for over an hour. Writer informs patient that RN stated she is not due for anything, pt continues to decline. Nursing staff notified.         Electronically signed by ROBERTO Joiner on 3/11/25 at 1:14 PM EDT    
Additional supplies given to patient for thigh and abdomen wound.  
Anion gap normal.  Now has normal anion gap metabolic acidosis due to fluid resuscitation    Patient apparently had a wide-complex tachycardia overnight so she was sent back to the ICU.  Likely SVT with aberrancy and conduction delay.  Really not much else to do.    EP supposed to see the patient    Nothing really to add from my perspective.    Following peripherally due to the fact the patient is physically in the intensive care unit but when she transfers out of we will sign off.    Ronan Hammer MD  Pulmonary and Critical Care  [phone] [facility] [employer]  [phone] Cell    
Blood glucose 120. Insulin gtt paused per order. Gtt to be restarted in 60 minutes. See MAR.  
CNP Samantha Smalls messaged regarding pain control. No response received.     0230: Pt stating that her pain has been uncontrolled and asking if I can reach out again. CNP Laquita Duron messaged regarding pain control. 1x dose of fentanyl 50 mcg ordered.   
CODE BLUE initially called.  Provider to bedside.  CODE BLUE changed to rapid response.  Patient was in wide angle SVT.  Patient asymptomatic.  On the monitor heart rate showed 220.  Auscultated heart rate was 120.  Blood pressure unstable systolic readings in between 70s and 80s.  10 mg oral midodrine given 1 time.  IV bolus.  Cardiology consulted.  Amiodarone bolus.  Patient blood pressure rising to 96 systolic.  Patient to transfer to intermediate ICU at this time.  Patient still asymptomatic.  
Chart reviewed.  Following peripherally.  Plans for tentative discharge today once cleared by cardiology noted.  No objection from my perspective.    Ronan Hammer MD  Pulmonary and Critical Care  [phone] [facility]  [phone] Cell    
Comprehensive Nutrition Assessment    Type and Reason for Visit:  Reassess    Nutrition Recommendations/Plan:   Recommend 5 carbohydrate choices per tray with double protein portions post procedure.     Malnutrition Assessment:  Malnutrition Status:  At risk for malnutrition (03/10/25 1347)    Context:  Acute Illness     Findings of the 6 clinical characteristics of malnutrition:  Energy Intake:  Mild decrease in energy intake  Weight Loss:  Mild weight loss (3% wt loss over 7 months)     Body Fat Loss:  No body fat loss     Muscle Mass Loss:  No muscle mass loss    Fluid Accumulation:  Mild Extremities   Strength:  Not Performed    Nutrition Assessment:    Pt is npo for EGD today. Discussion of discharge to SNF noted. Pt states food has been good but she is not receiving double protein at meals- she is sending Ambassador back downstairs to bring it up to her.    Nutrition Related Findings:    Trace BLE, Labs:POC Glu 66-77, Meds: Reviewed, BM 3/13 Wound Type: Multiple, Diabetic Ulcer (nonhealing)       Current Nutrition Intake & Therapies:    Average Meal Intake: %     Diet NPO Exceptions are: Sips of Water with Meds    Anthropometric Measures:  Height: 172.7 cm (5' 8\")  Ideal Body Weight (IBW): 140 lbs (64 kg)    Admission Body Weight: 93 kg (205 lb) (NS)  Current Body Weight: 93 kg (205 lb), 149.3 % IBW. Weight Source: Bed scale  Current BMI (kg/m2): 31.2  Usual Body Weight: 98 kg (216 lb) (8/24)     % Weight Change (Calculated): -3.2                    BMI Categories: Obese Class 1 (BMI 30.0-34.9)    Estimated Daily Nutrient Needs:  Energy Requirements Based On: Formula  Weight Used for Energy Requirements: Admission  Energy (kcal/day): [place] x 1-1.1= 1627-9418 kcal  Weight Used for Protein Requirements: Admission  Protein (g/day): 1.5g/kg= 140-1145 g  Method Used for Fluid Requirements: 1 ml/kcal  Fluid (ml/day): 3910-4573 ml    Nutrition Diagnosis:   Increased nutrient needs related to  (healing) as 
Dr Estrada updated on pain med home dose as seen in care everywhere. Ok to adjust.  
Dr Flores consulted via perfect serve for further EP recommendations.  
Dr Perdomo notified of new consult via secure message.  
Dr. Gary called unit for update.Orders received for versed 4 mg, fentanyl 50 mcg, and adenosine 6 mg and 12 mg. See new orders.  
Dr. Gary updated again on pt status. Orders received for another dose of 250 mcg digoxin. MD to come in to unit for cardioversion.  
Dr. Gary updated on pt status. Still in vtach with amiodarone bolus running. ECG results shared with cardiologist. Pt transferred to full ICU status and orders received 250 mcg digoxin. See new orders.  
Dr. Maldonado notified of pt status and low BP. Orders received to start phenylephrine drip. See new orders.  
EHSAN Madera notified of patient c/o nausea. Order received for PRN IV Zofran 4 mg q6.  
EHSAN Madera notified of patient c/o pain in her L hip and thigh. IV Dilaudid 0.5mg PRN Q3HR ordered.  
EHSAN Madera notified of patient requesting her PO Ativan to be switched to IV . Order received.   
GI notified of new consult.   
[facility]   OCCUPATIONAL THERAPY MISSED TREATMENT NOTE   INPATIENT   Date: 3/12/25  Patient Name: Krista Chiang       Room:   MRN: [id number]   Account #: [id number]    : 1980  (44 y.o.)  Gender: female   Referring Practitioner: Cassy Lafleur APRN - NP  Diagnosis: DKA type 1             REASON FOR MISSED TREATMENT:  Patient declined   -    Upon arrival, patient supine in bed. OT educating patient on OT POC and provided encouragement for engagement in therapy, patient declined due to continuous loose stool with nursing aware. OT will continue to follow.             Electronically signed by KATHLEEN Israel on 3/12/25 at 11:33 AM EDT   
Insulin gtt to be stopped per Dr Estrada. Patient to have a regular diet and writer to give lantus after eating lunch due to blood sugar 102. Patient ok to transfer to [facility] with telemetry if tolerating diet. See orders.  
Last 3 out of 4 blood glucose checks required pause of insulin gtt per order. Dr Estrada notified. Awaiting response  
MAURICIO Velasquez NP with GI rounding at bedside. Patient incontinent of large, soft, formed, brown bowel movement. Updates given. No need for CT abd at this time from GI standpoint. Dr Estrada notified, CT abd to be discontinued.   
New consult to cardiology; Dr. Gary. New orders received.   
Occupational Therapy  [facility]   OCCUPATIONAL THERAPY MISSED TREATMENT NOTE   INPATIENT   Date: 3/14/25  Patient Name: Krista Chiang       Room:   MRN: [id number]   Account #: [id number]    : 1980  (44 y.o.)  Gender: female   Referring Practitioner: Cassy Lafleur APRN - NP  Diagnosis: DKA type 1             REASON FOR MISSED TREATMENT:  [phone]    -  as writer was discussing OT POC with pt transport arrives to take pt down for testing. Will continue to follow for OT needs.         Electronically signed by ROBERTO Lester on 3/14/25 at 1:23 PM EDT  
Ok to discharge per GI.  
Once Albumin IV completed, rechecked blood pressure and was 74/34. Writer notified NP and order for one time dose Midodrine ordered.   
Patient appeared to be V-fib on monitor; this writer enters room patient awake and talking. Telemetry monitor still reading V-fib; code blue called. Patient asymptomatic; providers at bedside.   
Patient arrived to room 2006 accompanied by RN Berenice and PEBBLES Singh per ER stretcher. Patient was able to pivot from stretcher to ICU bed with 1 assist. Placed the patient on cardiac monitor. Fall precautions in place. Oriented to room and call light. Assessment by primary RN to follow.  
Patient called out asking if she could have anything else for pain. RN told patient she will contact the NP but we will have to see due to her blood pressures last night. RN received orders for a one time dose of Roxicodone. See MAR for details.   
Patient complaints of pain and being anxious. Spoke with primary nurse. Ativan was offered to patient and was willing to take the med.    
Patient family member Naima updated on patient's transfer to ICU. All questions answered.   
Patient refused bed alarm. Educated on fall risk. Refusal form signed and placed in chart.  
Patient sustaining heart rate from 130-140s with peaks of 150; AMALIA Ames notified; new orders received.   
Patient transported to intermediate ICU room 2011, attached to monitor, vitals obtained, and belongings placed in patient locker. Full assessment to be completed by primary RN. Med Surg RN at [facility] to give report to primary RN. Ultrasound IV RN at bedside to place peripheral IV.   
Patients IV infiltrated, RN attempted twice patient asked to not be poked anymore tonight. Patient told RN prior she is a hard stick. VAT orders placed for AM.  
Per Dr. Gary give 2 mg Versed and 1 mg dilaudid before 6 mg adenosine. See MAR.  
Physical Therapy        Physical Therapy Cancel Note      DATE: 3/11/2025    NAME: Krista Chiang  MRN: [id number]   : 1980      Patient not seen this date for Physical Therapy due to:    Patient Declined: pt declining due to being in too much pain. Spoke with RN- pt not due for pain medication until 1510. Will attempt to see pt in AM and coordinate with pain meds      Electronically signed by Ellen Garcia PT on 3/11/2025 at 1:31 PM     
Physical Therapy        Physical Therapy Cancel Note      DATE: 3/12/2025    NAME: Krista Chiang  MRN: [id number]   : 1980      Patient not seen this date for Physical Therapy due to:    Patient Declined: at 10:29 due to c/o loose stools and vomitting. Will attempt evaluation tomorrow.      Electronically signed by Gosia Eldridge PT on 3/12/2025 at 11:34 AM     
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 encounter, Constipation, Depression, Fibromyalgia, Genital herpes, unspecified, GERD (gastroesophageal reflux disease), H/O bariatric surgery, History of pulmonary embolism, Hx of blood clots, Hypertension, Lives in nursing home, Lumbosacral spondylosis without myelopathy, MDRO (multiple drug resistant organisms) resistance, Mobility impaired, MRSA (methicillin resistant staph aureus) culture positive, Muscle weakness, Obsessive-compulsive disorder, unspecified, Opioid abuse, in remission ([facility]), Pernicious anemia, Polyneuropathy, unspecified, PTSD (post-traumatic stress disorder), Pulmonary embolism ([facility]), Suicidal behavior, SVT (supraventricular tachycardia), Type 2 diabetes mellitus, with long-term current use of insulin ([facility]), Under care of team, and Under care of team.  Past Surgical History:   has a past surgical history that includes Cholecystectomy; Liver Resection (); Tonsillectomy; Abdominal hernia repair (); Abdominal hernia repair (2014); Bariatric Surgery (2013); Dilation and curettage of uterus (); Finger amputation (Left, 2015); lymph node biopsy (); Total abdominal hysterectomy w/ bilateral salpingoophorectomy (); IVC filter insertion; hip surgery (Left); Ankle fracture surgery (Left);  cath power picc single 
Pt arrived to medical surgical unit alert and oriented. Vitals obtained. Pt can swallow safely   
Pt continues to have diarrhea every time she eats. Pt states she doesn't feel she is ready to go home but also does not want to go to SNF because she states that last time her insurance did not cover her stay and she can't afford it. Writer updated Dr. Heard on pt's concerns and inquired about imodium.   
Report called to Zachery on [facility], all questions answered.    Pt transferred to 2076 with all belongings.  
Secure message sent to Dr Heard regarding IV fluid orders. Pt is currently on insulin gtt, and reeving D5 1/2 NS at 150mLs/hr. Pt is requiring PIV potassium replacement but is unable to tolerate potassium at the ordered rate of 100mLs/hr. Writer injuring about changing continuous IV fluids to something containing potassium. Awaiting response.   
Wound Ostomy Continence Nursing  Follow Up Visit      NAME:  Krista Chiang  MEDICAL RECORD NUMBER:  [id number]  AGE: 44 y.o.   GENDER: female  : 1980  TODAY'S DATE:  3/14/2025    Subjective        Follow-up visit today for management of abdominal and right medial thigh wounds.  The patient states these wounds are both exquisitely tender to manipulation.  She had just taking a pain pill prior to this visit.  We discussed her wound care needs at discharge with expected follow-up at [facility].The patient is in agreement    Review of Systems:  Constitutional: negative for chills and fevers  Respiratory: negative for cough and shortness of breath  Cardiovascular: negative for chest pain and palpitations  Gastrointestinal: positive for nausea, negative for abdominal pain and jaundice  Genitourinary:negative  Integument: Multiple wounds  Endocrine:  Recent A1c<8  Musculoskeletal:negative  Behavioral/Psych: negative  Pain: exquisite wound pain abdominal and right thigh      Objective     Vitals:  /70   Pulse 67   Temp 98.4 °F (36.9 °C) (Oral)   Resp 18   Ht 1.727 m (5' 8\")   Wt 93 kg (205 lb 0.4 oz)   SpO2 99%   BMI 31.17 kg/m²    TEMPERATURE:  Current - Temp: 98.4 °F (36.9 °C); Max - Temp  Av.1 °F (36.7 °C)  Min: 97.2 °F (36.2 °C)  Max: 99 °F (37.2 °C)    Calixto Risk Score Calixto Scale Score: 15    INTAKE/OUTPUT:      Intake/Output Summary (Last 24 hours) at 3/14/2025 1225  Last data filed at 3/13/2025 1357  Gross per 24 hour   Intake --   Output 2 ml   Net -2 ml                 Physical Exam:  General Appearance: alert and oriented to person, place and time, well-developed and well-nourished, in no acute distress  Head: normocephalic and atraumatic  Pulmonary/Chest: normal air movement, no respiratory distress  Skin: Abdominal and white thigh wounds with some periwound erythema.  Noted combination of pink granular tissue and some yellow slough.  Wounds exquisitely tender. 
Writer notified by lab that 2 of lab staffed tried to draw blood and could not get blood. Morning labs not completed.  
Writer rechecked blood pressure after giving Midodrine, BP 95/49. NP notified no new orders.  
Writer went to give night time medications and patients blood pressure was 79/37. Writer reached out to NP and new order for albumin IV placed.  
    Patient is on chronic anticoagulation  Patient is on chronic anticoagulation  Patient is on chronic anticoagulation    Betadine [Povidone Iodine] Rash    Celebrex [Celecoxib] Itching    Celexa [Citalopram Hydrobromide]     Citalopram Itching, Rash and Swelling     Other reaction(s): Unknown    Furosemide      Tolerates Bumex  Other reaction(s): Unknown    Nitrofurantoin      Other reaction(s): Unknown    Tricyclic Antidepressants     Codeine Rash and Hives     Other reaction(s): Unknown    Lithium Nausea And Vomiting     Other reaction(s): Unknown    Paroxetine Rash and Hives     Other reaction(s): Unknown  Paxil    Paxil [Paroxetine Hcl] Rash    Povidone Iodine Rash     Other reaction(s): Unknown    Sertraline Hives and Rash    Tape [Adhesive Tape] Rash     Paper tape    Tegretol [Carbamazepine] Rash       Current Meds: prochlorperazine (COMPAZINE) tablet 5 mg, Q8H PRN  ondansetron (ZOFRAN) injection 2 mg, Q6H PRN  lactobacillus (CULTURELLE) capsule 1 capsule, BID WC  oxyCODONE-acetaminophen (PERCOCET) 5-325 MG per tablet 1 tablet, Q8H PRN   And  oxyCODONE (ROXICODONE) immediate release tablet 2.5 mg, Q8H PRN  clobetasol (TEMOVATE) 0.05 % ointment, Daily  insulin glargine (LANTUS) injection vial 15 Units, Daily  glucose chewable tablet 16 g, PRN  dextrose bolus 10% 125 mL, PRN   Or  dextrose bolus 10% 250 mL, PRN  glucagon injection 1 mg, PRN  dextrose 10 % infusion, Continuous PRN  [Held by provider] loperamide (IMODIUM) capsule 2 mg, 4x Daily PRN  metoprolol tartrate (LOPRESSOR) tablet 50 mg, BID  flecainide (TAMBOCOR) tablet 100 mg, BID  [Held by provider] bumetanide (BUMEX) tablet 2 mg, Daily PRN  buPROPion (WELLBUTRIN XL) extended release tablet 150 mg, QAM  cholestyramine light packet 4 g, Daily  [Held by provider] dicyclomine (BENTYL) capsule 10 mg, 4x Daily AC & HS  FLUoxetine (PROZAC) capsule 40 mg, Daily  fondaparinux (ARIXTRA) injection 7.5 mg, Daily  [Held by provider] gabapentin (NEURONTIN) capsule 
1.5g/kg= 140-1145 g  Method Used for Fluid Requirements: 1 ml/kcal  Fluid (ml/day): 7152-8444 ml    Nutrition Diagnosis:   Increased nutrient needs related to  (healing) as evidenced by wounds    Nutrition Interventions:   Food and/or Nutrient Delivery: Modify Current Diet, Start Oral Nutrition Supplement  Nutrition Education/Counseling: No recommendation at this time  Coordination of Nutrition Care: Continue to monitor while inpatient       Goals:  Goals: PO intake 75% or greater  Type of Goal: New goal  Previous Goal Met: New Goal    Nutrition Monitoring and Evaluation:      Food/Nutrient Intake Outcomes: Food and Nutrient Intake, Supplement Intake  Physical Signs/Symptoms Outcomes: Biochemical Data, GI Status, Nausea or Vomiting, Fluid Status or Edema, Skin, Weight    Discharge Planning:    Too soon to determine     Jenn Moran RD, LD  Contact: [phone]    
WNL  Patient affect:: Normal    Activities of Daily Living  Putting On/Taking Off Footwear: Dependent/Total;Based on clinical judgement  Putting On/Taking Off Footwear Skilled Clinical Factors: TA for donning nonskid socks while seated on EOB, patient reporting utilizing sock aid at baseline  Additional Comments: Patient continues to be limited due to L hip pain and continuous vomiting/loose stool Pt presents with impaired balance, decreased strength, decreased activity tolerance, and decreased functional endurance.    Balance  Balance  Sitting Balance: Stand by assistance (for dynamic sitting while seated EOB with UE exercises)    Transfers/Mobility  Bed mobility  Supine to Sit: Stand by assistance  Sit to Supine: Minimal assistance  Scooting: Stand by assistance  Bed Mobility Comments: increased time for transitional movements with utilizing UEs to move L LE, requiring assistance with lifting LE back into bed  Transfers  Transfer Comments: declined completed this date due to pain/fatigue    Functional Mobility  Functional Mobility Comments: declined completed this date due to pain/fatigue    OT Exercises  Exercise Treatment: While seated EOB, OT engaging patient in UE exercises with green theraband to increase UE strength to increase independence/safety with selfcare tasks with patient completing x15 reps of chest pullaparpts, shoulder flexion/extension and bicep curls with overall good tolerance noted    Patient Education  Patient Education  Education Given To: Patient  Education Provided: Role of Therapy, Plan of Care, Home Exercise Program  Education Provided Comments: discussion of D/C planning with patient reporting concern for returning home due to limited support but dissatisfied with previous skilled stay due to lack of therapy  Education Method: Demonstration, Verbal  Barriers to Learning: None  Education Outcome: Verbalized understanding, Demonstrated understanding, Continued education 
primary service  Ppi bid    4. Elevated lft; hx etoh abuse and hepatic adenoma   EBV, chronic/reactivated; liver autoimmune and acute hepatitis  testing negative   -US shows fatty infiltration     5.antiphospholipid syndrome     6.left hip/thigh wound mgt per primary/wound care     7.SVT-improved      This plan was formulated in collaboration with Dr. berumen .    Electronically signed by: BERNARDA Ryan - CNP on 3/12/2025 at 9:01 AM     Attending Attestation:    I have discussed the care of Krista FONTENOT Chiang and I have examined the patient myselft and taken ros and hpi , including pertinent history and exam findings,  with the author of this note . I have reviewed the key elements of all parts of the encounter with the nurse practitioner/resident.  I agree with the assessment, plan and orders as documented by the above health care provider with the following addendum.     More than 50% of the time was spent taking care of this patient in addition to the nurse practitioner time.  That also included history taking follow-up physical examination and review of system.    Electronically signed by Alfie Berumen MD       
  5.antiphospholipid syndrome     6.left hip/thigh wound mgt per primary/wound care     7.SVT-improved        This plan was formulated in collaboration with Dr. wilson .    Electronically signed by: BERNARDA Ryan CNP on 3/13/2025 at 7:51 AM     Attending Attestation:    I have discussed the care of Krista Chiang and I have examined the patient myselft and taken ros and hpi , including pertinent history and exam findings,  with the author of this note . I have reviewed the key elements of all parts of the encounter with the nurse practitioner/resident.  I agree with the assessment, plan and orders as documented by the above health care provider with the following addendum.     More than 50% of the time was spent taking care of this patient in addition to the nurse practitioner time.  That also included history taking follow-up physical examination and review of system.    Electronically signed by Alfie Wilson MD       
subsequent encounter     Nonhealing surgical wound, subsequent encounter     Open wnd hip/thigh-complicated, left, subsequent encounter     Intractable pain     Failure to thrive in adult     Black stool     Abdominal pain     Nausea and vomiting     Wound infection     Chronic wound     Rupture of operation wound     Skin ulcer of abdomen with fat layer exposed (HCC)     Skin ulcer of thigh, right, with fat layer exposed (HCC)     DKA, type 1, not at goal (HCC)     Diarrhea     Dizziness     Wide QRS ventricular tachycardia (HCC)        Plan:   Appreciate electrophysiology input.  Patient started on flecainide and beta-blocker therapy.  Rhythm is now stable.  Will need outpatient follow-up with EP for consideration of EPS/RFA.  Monitor electrolytes  Therapeutic anticoagulation with fondaparinux  No objection to transfer out of ICU from cardiology perspective  Continue to monitor telemetry      Discussed with patient and nursing.       Cameron Gary MD [facility] [place]                   
03/15/25 1421   PT Individual Minutes   Time In 1320   Time Out 1400   Minutes 40         Electronically signed by Gosia Linares PTA on 3/15/25 at 2:21 PM EDT         
11:21 AM EDT          
Daily Activity Raw Score: 15  AM-PAC Inpatient ADL T-Scale Score : 34.69  ADL Inpatient CMS 0-100% Score: 56.46  ADL Inpatient CMS G-Code Modifier : CK       Goals     Short Term Goals  Time Frame for Short Term Goals: by DC  Short Term Goal 1: pt will complete grooming and UB self care with modified ind with use of modified techniques as needed.  Short Term Goal 2: pt will complete LB ADLS and toileting tasks with CHICHO and modified techniques/AE PRN with good safety.  Short Term Goal 3: pt will demonstrate/verbalize good understanding of AE techniques, modified techniques, EC/WS technique strategies, and falls prevention strategies during self care and functional activities.  Short Term Goal 4: pt will demonstrate CGA with functional transfers/mobility with LRAD and good safety awareness.  Short Term Goal 5: pt to tolerate static/dynamic standing for 5+ minutes with LRAD and CGA in order to complete self care tasks.  Short Term Goal 6: Pt to participate in 15-25+ minutes of therapeutic exercises/functional activities in order to improve self care and mobility at a safe and ind level.    Plan  Occupational Therapy Plan  Times Per Week: 4-5  Times Per Day: Once a day  Current Treatment Recommendations: Strengthening, Balance training, Pain management, Safety education & training, Patient/Caregiver education & training, Equipment evaluation, education, & procurement, Positioning, Self-Care / ADL    OT Individual Minutes  OT Individual Minutes  Time In: 0935  Time Out: 1042  Minutes: 67  Time Code Minutes   Timed Code Treatment Minutes: 51 Minutes    Electronically signed by KATHLEEN Ramirez  on 3/10/25 at 2:20 PM EDT       
cardiogenic (HCC) [R57.0] 03/12/2025    DKA, type 1, not at goal (HCC) [E10.10] 03/10/2025    Diarrhea [R19.7] 03/10/2025    Dizziness [R42] 03/10/2025    S/P gastric bypass [Z98.84] 02/23/2019       Plan:     Patient status Admit as inpatient in the  Medical ICU  Patient is 44-year-old female multiple comorbidities including history of recurrent UTIs type 2 diabetes mellitus last HbA1c 11.1, history of Painting's esophagus, hip arthroplasty with resulted in the infection and hardware removal status post gastric bypass surgery antiphospholipid syndrome anxiety depression chronic pain chronic wounds presented to the ER with multiple symptoms, complaining of intractable nausea vomiting diarrhea and hip pain.,  Patient was found to be in DKA  High anion gap acidosis secondary to DKA?  And GI losses, beta-hydroxybutyrate was not elevated VBG showed pH of 7.3, her low bicarb is likely secondary to underlying diarrhea as well, no abdominal movement since she got admitted   patient insulin drip has been paused from last couple of hours due to.  Of hypoglycemia, was switched to D5 and half-normal saline, will DC insulin drip, switch to Lantus sliding scale and advance the diet as tolerated,  Monitor blood sugar closely    Uncontrolled diabetes mellitus last HbA1c was 11.1, during last admission patient was having episodes of hypoglycemia was likely noncompliant with insulin at home, supposed to take 35 units in the morning and 30 at night, will start with 10 units for now, and slowly advance    Chronic nausea vomiting and abdominal pain and diarrhea seen by GI, no bowel movement since admission, will check C. difficile GI consulted    Syncopal episode, likely secondary to uncontrolled hyperglycemia and vasovagal, check orthostats, patient on fondaparinux at home for antiphospholipid will check head CT as well  Echocardiogram was done 2 months ago showed preserved ejection    Chronic hip hip pain, had x-ray of the hip, showed 
on sliding scale next  Labs reviewed  Will discharge today once cleared by cardiology      Consultations:   IP CONSULT TO HOSPITALIST  IP CONSULT TO CRITICAL CARE  IP CONSULT TO DIABETES EDUCATOR  IP CONSULT TO GI  IP CONSULT TO ORTHOPEDIC SURGERY  IP CONSULT TO CARDIOLOGY  IP CONSULT TO CRITICAL CARE  IP CONSULT TO ELECTROPHYSIOLOGY    = Patient is admitted as inpatient status because of co-morbidities listed above, severity of signs and symptoms as outlined, requirement for current medical therapies and most importantly because of direct risk to patient if care not provided in a hospital setting.    Crystal Estrada MD  3/12/2025  7:40 AM    Copy sent to Dr. Melton, Arielle PINEDA, APRN - NP    Please note that this chart was generated using voice recognition Dragon dictation software.  Although every effort was made to ensure the accuracy of this automated transcription, some errors in transcription may have occurred.

## 2025-03-16 NOTE — CARE COORDINATION
Continuity of Care Form    Patient Name: Krista Chiang   :  1980  MRN:  [id number]    Admit date:  3/9/2025  Discharge date:  3/12/25    Code Status Order: Full Code   Advance Directives:     Admitting Physician:  Wilner Ash MD  PCP: Arielle Melton APRN - NP    Discharging Nurse:   Discharging Hospital Unit/Room#:   Discharging Unit Phone Number: [phone]    Emergency Contact:   Extended Emergency Contact Information  Primary Emergency Contact: Naima Grant  Address: [address] [address] of [facility]  Home Phone: [phone]  Mobile Phone: [phone]  Relation: Parent  Secondary Emergency Contact: Zachery Grant  Home Phone: [phone]  Mobile Phone: [phone]  Relation: Brother/Sister    Past Surgical History:  Past Surgical History:   Procedure Laterality Date    ABDOMINAL HERNIA REPAIR      incisional hernia repair, complicated by infection, had multiple surgeries for this    ABDOMINAL HERNIA REPAIR  2014    ANKLE FRACTURE SURGERY Left     2023  HIP IRRIGATION AND DEBRIDEMENT AND PLACEMENT OF ANTIBIOTIC IMPREGNATED CEMENT BEADS performed by Dipak Traore DO at [facility] OR    ANKLE FRACTURE SURGERY Left 10/24/2023    IRRIGATION AND DEBRIDEMENT OF LEFT HIP WITH CLOSURE performed by Dipak Traore DO at [facility] OR    ANKLE SURGERY Left 2019    ligament    ANKLE SURGERY Left 2023    IRRIGATION AND DEBRIDEMENT HIP (IRRISEPT, CELLERATE) performed by Dipak Traore DO at [facility] OR    BARIATRIC SURGERY  2013    [facility]     SECTION      CHOLECYSTECTOMY      DILATION AND CURETTAGE OF UTERUS      ESOPHAGOGASTRODUODENOSCOPY  2021    ESOPHAGOGASTRODUODENOSCOPY  2013    FINGER AMPUTATION Left 2015    index and ring finger. from [facility] CATH POWER PICC SINGLE  2023    HIP SURGERY Left     2023 HIP HEMIARTHROPLASTY performed by Dipak SHEA

## 2025-03-16 NOTE — CARE COORDINATION
DISCHARGE PLANNING NOTE:    Writer is following for potential discharge to home with Eugenio RAO. Writer met with pt for update, confirms mother should be able to transport if medically cleared for discharge today. [employer] message placed to bedside PEBBLES Dorsey for update on transportation.  Electronically signed by RIOS Mccartney on 3/16/2025 at 10:50 AM

## 2025-03-16 NOTE — PLAN OF CARE
Problem: Chronic Conditions and Co-morbidities  Goal: Patient's chronic conditions and co-morbidity symptoms are monitored and maintained or improved  3/10/2025 1815 by Angy Crane RN  Outcome: Progressing  Flowsheets (Taken 3/10/2025 1815)  Care Plan - Patient's Chronic Conditions and Co-Morbidity Symptoms are Monitored and Maintained or Improved:   Monitor and assess patient's chronic conditions and comorbid symptoms for stability, deterioration, or improvement   Collaborate with multidisciplinary team to address chronic and comorbid conditions and prevent exacerbation or deterioration   Update acute care plan with appropriate goals if chronic or comorbid symptoms are exacerbated and prevent overall improvement and discharge     Problem: Discharge Planning  Goal: Discharge to home or other facility with appropriate resources  3/10/2025 1815 by Angy Crane RN  Outcome: Progressing  Flowsheets (Taken 3/10/2025 1815)  Discharge to home or other facility with appropriate resources: Identify barriers to discharge with patient and caregiver     Problem: Pain  Goal: Verbalizes/displays adequate comfort level or baseline comfort level  3/10/2025 1815 by Angy Crane, RN  Outcome: Progressing  Flowsheets (Taken 3/10/2025 1815)  Verbalizes/displays adequate comfort level or baseline comfort level:   Encourage patient to monitor pain and request assistance   Implement non-pharmacological measures as appropriate and evaluate response   Assess pain using appropriate pain scale     Problem: ABCDS Injury Assessment  Goal: Absence of physical injury  3/10/2025 1815 by Angy Crane, RN  Outcome: Progressing  Flowsheets (Taken 3/10/2025 1815)  Absence of Physical Injury: Implement safety measures based on patient assessment     Problem: Skin/Tissue Integrity  Goal: Skin integrity remains intact  Description: 1.  Monitor for areas of redness and/or skin breakdown  2.  Assess vascular 
  Problem: Chronic Conditions and Co-morbidities  Goal: Patient's chronic conditions and co-morbidity symptoms are monitored and maintained or improved  3/13/2025 1652 by Zachery Moraes RN  Outcome: Progressing  3/13/2025 0337 by Kavita Zambrano RN  Outcome: Progressing  Flowsheets (Taken 3/13/2025 0337)  Care Plan - Patient's Chronic Conditions and Co-Morbidity Symptoms are Monitored and Maintained or Improved:   Monitor and assess patient's chronic conditions and comorbid symptoms for stability, deterioration, or improvement   Collaborate with multidisciplinary team to address chronic and comorbid conditions and prevent exacerbation or deterioration     Problem: Discharge Planning  Goal: Discharge to home or other facility with appropriate resources  3/13/2025 1652 by Zachery Moraes RN  Outcome: Progressing  3/13/2025 0337 by Kavita Zambrano RN  Outcome: Progressing  Flowsheets (Taken 3/13/2025 0337)  Discharge to home or other facility with appropriate resources:   Identify barriers to discharge with patient and caregiver   Identify discharge learning needs (meds, wound care, etc)   Arrange for needed discharge resources and transportation as appropriate   Refer to discharge planning if patient needs post-hospital services based on physician order or complex needs related to functional status, cognitive ability or social support system     Problem: Pain  Goal: Verbalizes/displays adequate comfort level or baseline comfort level  3/13/2025 1652 by Zachery Moraes RN  Outcome: Progressing  3/13/2025 0337 by Kavita Zambrano RN  Outcome: Progressing  Flowsheets (Taken 3/13/2025 0337)  Verbalizes/displays adequate comfort level or baseline comfort level:   Encourage patient to monitor pain and request assistance   Assess pain using appropriate pain scale   Administer analgesics based on type and severity of pain and evaluate response   Implement non-pharmacological measures as appropriate and evaluate 
  Problem: Chronic Conditions and Co-morbidities  Goal: Patient's chronic conditions and co-morbidity symptoms are monitored and maintained or improved  3/15/2025 0310 by Rachel Kang RN  Outcome: Progressing  Flowsheets (Taken 3/14/2025 1953)  Care Plan - Patient's Chronic Conditions and Co-Morbidity Symptoms are Monitored and Maintained or Improved: Monitor and assess patient's chronic conditions and comorbid symptoms for stability, deterioration, or improvement     Problem: Discharge Planning  Goal: Discharge to home or other facility with appropriate resources  3/15/2025 0310 by Rachel Kang RN  Outcome: Progressing  Flowsheets (Taken 3/14/2025 1953)  Discharge to home or other facility with appropriate resources: Identify barriers to discharge with patient and caregiver     Problem: Pain  Goal: Verbalizes/displays adequate comfort level or baseline comfort level  3/15/2025 0310 by Rachel Kang RN  Outcome: Progressing     Problem: ABCDS Injury Assessment  Goal: Absence of physical injury  3/15/2025 0310 by Rachel Kang RN  Outcome: Progressing  Flowsheets (Taken 3/15/2025 0309)  Absence of Physical Injury: Implement safety measures based on patient assessment     Problem: Safety - Adult  Goal: Free from fall injury  3/15/2025 0310 by Rachel Kang RN  Outcome: Progressing  Flowsheets (Taken 3/15/2025 0309)  Free From Fall Injury: Instruct family/caregiver on patient safety     Problem: Gastrointestinal - Adult  Goal: Maintains adequate nutritional intake  3/15/2025 0310 by Rachel Kang RN  Outcome: Progressing  Flowsheets (Taken 3/14/2025 1953)  Maintains adequate nutritional intake: Monitor percentage of each meal consumed     
  Problem: Chronic Conditions and Co-morbidities  Goal: Patient's chronic conditions and co-morbidity symptoms are monitored and maintained or improved  3/15/2025 1724 by Sunita Powers RN  Outcome: Progressing  3/15/2025 1724 by Suniat Powers RN  Outcome: Progressing     Problem: Discharge Planning  Goal: Discharge to home or other facility with appropriate resources  3/15/2025 1724 by Sunita Powers RN  Outcome: Progressing  3/15/2025 1724 by Sunita Powers RN  Outcome: Progressing     Problem: Pain  Goal: Verbalizes/displays adequate comfort level or baseline comfort level  3/15/2025 1724 by Sunita Powers RN  Outcome: Progressing  3/15/2025 1724 by Sunita Powers RN  Outcome: Progressing     Problem: ABCDS Injury Assessment  Goal: Absence of physical injury  3/15/2025 1724 by Sunita Powers RN  Outcome: Progressing  3/15/2025 1724 by Sunita Powers RN  Outcome: Progressing     Problem: Skin/Tissue Integrity  Goal: Skin integrity remains intact  Description: 1.  Monitor for areas of redness and/or skin breakdown  2.  Assess vascular access sites hourly  3.  Every 4-6 hours minimum:  Change oxygen saturation probe site  4.  Every 4-6 hours:  If on nasal continuous positive airway pressure, respiratory therapy assess nares and determine need for appliance change or resting period  3/15/2025 1724 by Sunita Powers RN  Outcome: Progressing  3/15/2025 1724 by Sunita Powers RN  Outcome: Progressing     Problem: Safety - Adult  Goal: Free from fall injury  3/15/2025 1724 by Sunita Powers RN  Outcome: Progressing  3/15/2025 1724 by Sunita Powers RN  Outcome: Progressing     Problem: Neurosensory - Adult  Goal: Achieves maximal functionality and self care  3/15/2025 1724 by Sunita Powers RN  Outcome: Progressing  3/15/2025 1724 by Sunita Powers RN  Outcome: Progressing     Problem: Skin/Tissue Integrity - Adult  Goal: Incisions, 
  Problem: Chronic Conditions and Co-morbidities  Goal: Patient's chronic conditions and co-morbidity symptoms are monitored and maintained or improved  3/16/2025 0149 by Yuliet Navarrete RN  Outcome: Progressing     Problem: Discharge Planning  Goal: Discharge to home or other facility with appropriate resources  3/16/2025 0149 by Yuliet Navarrete RN  Outcome: Progressing     Problem: Pain  Goal: Verbalizes/displays adequate comfort level or baseline comfort level  3/16/2025 0149 by Yuliet Navarrete RN  Outcome: Progressing     Problem: ABCDS Injury Assessment  Goal: Absence of physical injury  3/16/2025 0149 by Yuliet Navarrete RN  Outcome: Progressing     Problem: Skin/Tissue Integrity  Goal: Skin integrity remains intact  Description: 1.  Monitor for areas of redness and/or skin breakdown  2.  Assess vascular access sites hourly  3.  Every 4-6 hours minimum:  Change oxygen saturation probe site  4.  Every 4-6 hours:  If on nasal continuous positive airway pressure, respiratory therapy assess nares and determine need for appliance change or resting period  3/16/2025 0149 by Yuliet Navarrete RN  Outcome: Progressing     Problem: Safety - Adult  Goal: Free from fall injury  3/16/2025 0149 by Yuliet Navarrete RN  Outcome: Progressing     Problem: Neurosensory - Adult  Goal: Achieves maximal functionality and self care  3/16/2025 0149 by Yuliet Navarrete RN  Outcome: Progressing     Problem: Skin/Tissue Integrity - Adult  Goal: Incisions, wounds, or drain sites healing without S/S of infection  3/16/2025 0149 by Yuliet Navarrete RN  Outcome: Progressing     Problem: Gastrointestinal - Adult  Goal: Maintains adequate nutritional intake  3/16/2025 0149 by Yuliet Navarrete RN  Outcome: Progressing     Problem: Infection - Adult  Goal: Absence of fever/infection during anticipated neutropenic period  3/16/2025 0149 by Yuliet Navarrete RN  Outcome: 
  Problem: Chronic Conditions and Co-morbidities  Goal: Patient's chronic conditions and co-morbidity symptoms are monitored and maintained or improved  3/16/2025 1220 by Sunita Powers RN  Outcome: Adequate for Discharge  3/16/2025 0149 by Yuliet Navarrete RN  Outcome: Progressing     Problem: Discharge Planning  Goal: Discharge to home or other facility with appropriate resources  3/16/2025 1220 by Sunita Powers RN  Outcome: Adequate for Discharge  3/16/2025 0149 by Yuliet Navarrete RN  Outcome: Progressing     Problem: Pain  Goal: Verbalizes/displays adequate comfort level or baseline comfort level  3/16/2025 1220 by Sunita Powers RN  Outcome: Adequate for Discharge  3/16/2025 0149 by Yuliet Navarrete RN  Outcome: Progressing     Problem: ABCDS Injury Assessment  Goal: Absence of physical injury  3/16/2025 1220 by Sunita Powers RN  Outcome: Adequate for Discharge  3/16/2025 0149 by Yuliet Navarrete RN  Outcome: Progressing     Problem: Skin/Tissue Integrity  Goal: Skin integrity remains intact  Description: 1.  Monitor for areas of redness and/or skin breakdown  2.  Assess vascular access sites hourly  3.  Every 4-6 hours minimum:  Change oxygen saturation probe site  4.  Every 4-6 hours:  If on nasal continuous positive airway pressure, respiratory therapy assess nares and determine need for appliance change or resting period  3/16/2025 1220 by Sunita Powers RN  Outcome: Adequate for Discharge  3/16/2025 0149 by Yuliet Navarrete RN  Outcome: Progressing     Problem: Safety - Adult  Goal: Free from fall injury  3/16/2025 1220 by Sunita Powers RN  Outcome: Adequate for Discharge  3/16/2025 0149 by Yuliet Navarrete RN  Outcome: Progressing     Problem: Neurosensory - Adult  Goal: Achieves maximal functionality and self care  3/16/2025 1220 by Sunita Powers RN  Outcome: Adequate for Discharge  3/16/2025 0149 by [place] 
  Problem: Chronic Conditions and Co-morbidities  Goal: Patient's chronic conditions and co-morbidity symptoms are monitored and maintained or improved  Outcome: Progressing     Problem: Pain  Goal: Verbalizes/displays adequate comfort level or baseline comfort level  Outcome: Progressing     Problem: ABCDS Injury Assessment  Goal: Absence of physical injury  Outcome: Progressing     
44-year-old female, initially admitted with DKA, went into tachycardia earlier this morning, heart rate persistently in 200s, ECG showed regular wide-complex tachycardia suggestive of SVT with aberrancy, blood pressure dropped to 60s, patient was transferred to ICU, started on Gabe-Synephrine, received Amio and dig bolus without any change in heart rate.  I came and evaluated the patient at bedside.  Gave 1 dose of 6 mg of adenosine after adequate sedation and supervision, patient converted to sinus rhythm.  Blood pressure improved to 107/78.  I will continue amiodarone infusion for now.  Will start low-dose beta-blocker as blood pressure tolerates.  Will consult electrophysiology for further evaluation.  Full consult note to follow.  
Ct abd reviewed by dr berumen, pt has significant stool burdon which may be causing overflow diarrhea, miralax ordered will hold the imodium and cholestyramine for now ..Kyleigh Velasquez, APRN - CNP    
  Problem: Skin/Tissue Integrity  Goal: Skin integrity remains intact  Description: 1.  Monitor for areas of redness and/or skin breakdown  2.  Assess vascular access sites hourly  3.  Every 4-6 hours minimum:  Change oxygen saturation probe site  4.  Every 4-6 hours:  If on nasal continuous positive airway pressure, respiratory therapy assess nares and determine need for appliance change or resting period  Outcome: Progressing  Flowsheets (Taken 3/12/2025 0800 by Kelsey Givens, RN)  Skin Integrity Remains Intact: Monitor for areas of redness and/or skin breakdown     Problem: Safety - Adult  Goal: Free from fall injury  Outcome: Progressing     Problem: Neurosensory - Adult  Goal: Achieves maximal functionality and self care  Outcome: Progressing  Flowsheets (Taken 3/12/2025 0800 by Kelsey Givens, RN)  Achieves maximal functionality and self care:   Monitor swallowing and airway patency with patient fatigue and changes in neurological status   Encourage and assist patient to increase activity and self care with guidance from physical therapy/occupational therapy     Problem: Skin/Tissue Integrity - Adult  Goal: Incisions, wounds, or drain sites healing without S/S of infection  Outcome: Progressing  Flowsheets (Taken 3/12/2025 0800 by Kelsey Givens, RN)  Incisions, Wounds, or Drain Sites Healing Without Sign and Symptoms of Infection: ADMISSION and DAILY: Assess and document risk factors for pressure ulcer development     Problem: Gastrointestinal - Adult  Goal: Maintains adequate nutritional intake  Outcome: Progressing  Flowsheets (Taken 3/12/2025 0800 by Kelsey Givens, RN)  Maintains adequate nutritional intake:   Monitor percentage of each meal consumed   Identify factors contributing to decreased intake, treat as appropriate   Assist with meals as needed     Problem: Infection - Adult  Goal: Absence of fever/infection during anticipated neutropenic period  Outcome: Progressing  Flowsheets (Taken 3/12/2025 0800 
1455)  Verbalizes/displays adequate comfort level or baseline comfort level:   Encourage patient to monitor pain and request assistance   Assess pain using appropriate pain scale  3/10/2025 0455 by Berenice Deshpande RN  Outcome: Progressing  Flowsheets (Taken 3/10/2025 0200)  Verbalizes/displays adequate comfort level or baseline comfort level:   Encourage patient to monitor pain and request assistance   Assess pain using appropriate pain scale   Administer analgesics based on type and severity of pain and evaluate response     Problem: ABCDS Injury Assessment  Goal: Absence of physical injury  3/10/2025 1455 by Annika Starks RN  Outcome: Progressing  Flowsheets (Taken 3/10/2025 1455)  Absence of Physical Injury: Implement safety measures based on patient assessment  3/10/2025 0455 by Berenice Deshpande RN  Outcome: Progressing     Problem: Skin/Tissue Integrity  Goal: Skin integrity remains intact  Description: 1.  Monitor for areas of redness and/or skin breakdown  2.  Assess vascular access sites hourly  3.  Every 4-6 hours minimum:  Change oxygen saturation probe site  4.  Every 4-6 hours:  If on nasal continuous positive airway pressure, respiratory therapy assess nares and determine need for appliance change or resting period  3/10/2025 1455 by Annika Starks RN  Outcome: Progressing  Flowsheets (Taken 3/10/2025 0130 by Berenice Deshpande RN)  Skin Integrity Remains Intact: Monitor for areas of redness and/or skin breakdown  3/10/2025 0455 by Berenice Deshpande RN  Outcome: Progressing  Flowsheets (Taken 3/10/2025 0130)  Skin Integrity Remains Intact: Monitor for areas of redness and/or skin breakdown     Problem: Safety - Adult  Goal: Free from fall injury  3/10/2025 1455 by Annika Starks RN  Outcome: Progressing  Flowsheets (Taken 3/10/2025 1455)  Free From Fall Injury: Instruct family/caregiver on patient safety  3/10/2025 0455 by Berenice Deshpande RN  Outcome: Progressing     Problem: Neurosensory - 
and self care  Outcome: Progressing  Flowsheets (Taken 3/12/2025 0800 by Kelsey Givens, RN)  Achieves maximal functionality and self care:   Monitor swallowing and airway patency with patient fatigue and changes in neurological status   Encourage and assist patient to increase activity and self care with guidance from physical therapy/occupational therapy     Problem: Skin/Tissue Integrity - Adult  Goal: Incisions, wounds, or drain sites healing without S/S of infection  Outcome: Progressing  Flowsheets (Taken 3/12/2025 0800 by Kelsey Givens, RN)  Incisions, Wounds, or Drain Sites Healing Without Sign and Symptoms of Infection: ADMISSION and DAILY: Assess and document risk factors for pressure ulcer development     Problem: Gastrointestinal - Adult  Goal: Maintains adequate nutritional intake  Outcome: Progressing     Problem: Metabolic/Fluid and Electrolytes - Adult  Goal: Electrolytes maintained within normal limits  Outcome: Progressing  Flowsheets (Taken 3/14/2025 1706)  Electrolytes maintained within normal limits: Monitor labs and assess patient for signs and symptoms of electrolyte imbalances     Problem: Musculoskeletal - Adult  Goal: Maintain proper alignment of affected body part  Outcome: Progressing     Problem: Genitourinary - Adult  Goal: Urinary catheter remains patent  Outcome: Progressing     Problem: Nutrition Deficit:  Goal: Optimize nutritional status  Outcome: Progressing  Flowsheets  Taken 3/14/2025 1706 by Ora Wise, RN  Nutrient intake appropriate for improving, restoring, or maintaining nutritional needs: Assess nutritional status and recommend course of action  Taken 3/14/2025 1320 by Jenn Moran RD, LD  Nutrient intake appropriate for improving, restoring, or maintaining nutritional needs: Monitor oral intake, labs, and treatment plans     
of redness and/or skin breakdown  2.  Assess vascular access sites hourly  3.  Every 4-6 hours minimum:  Change oxygen saturation probe site  4.  Every 4-6 hours:  If on nasal continuous positive airway pressure, respiratory therapy assess nares and determine need for appliance change or resting period  3/14/2025 0235 by Leonel Becerra, RN  Outcome: Progressing  Flowsheets (Taken 3/13/2025 2152)  Skin Integrity Remains Intact:   Monitor for areas of redness and/or skin breakdown   Assess vascular access sites hourly     Problem: Safety - Adult  Goal: Free from fall injury  3/14/2025 0235 by Leonel Becerra, RN  Outcome: Progressing  Flowsheets (Taken 3/14/2025 0008)  Free From Fall Injury: Instruct family/caregiver on patient safety     
(Taken 3/10/2025 8480)  Urinary catheter remains patent: Assess patency of urinary catheter     
0337 by Kavita Zambrano RN  Outcome: Progressing     Problem: Metabolic/Fluid and Electrolytes - Adult  Goal: Glucose maintained within prescribed range  3/13/2025 0337 by Kavita Zambrano RN  Outcome: Progressing     Problem: Musculoskeletal - Adult  Goal: Maintain proper alignment of affected body part  3/13/2025 0337 by Kavita Zambrano RN  Outcome: Progressing     Problem: Genitourinary - Adult  Goal: Urinary catheter remains patent  3/13/2025 0337 by Kavita Zambrano RN  Outcome: Progressing     Problem: Nutrition Deficit:  Goal: Optimize nutritional status  3/13/2025 0337 by Kavita Zambrano RN  Outcome: Progressing

## 2025-03-16 NOTE — CARE COORDINATION
IMM letter provided to patient.  Patient offered four hours to make informed decision regarding appeal process; patient agreeable to discharge.   Electronically signed by RIOS Mccartney on 3/16/2025 at 10:50 AM

## 2025-03-17 ENCOUNTER — APPOINTMENT (OUTPATIENT)
Dept: CT IMAGING | Age: 45
DRG: 638 | End: 2025-03-17
Payer: COMMERCIAL

## 2025-03-17 ENCOUNTER — HOSPITAL ENCOUNTER (INPATIENT)
Age: 45
LOS: 1 days | Discharge: HOME | DRG: 638 | End: 2025-03-18
Attending: STUDENT IN AN ORGANIZED HEALTH CARE EDUCATION/TRAINING PROGRAM | Admitting: INTERNAL MEDICINE
Payer: COMMERCIAL

## 2025-03-17 ENCOUNTER — CARE COORDINATION (OUTPATIENT)
Dept: CARE COORDINATION | Age: 45
End: 2025-03-17

## 2025-03-17 DIAGNOSIS — E11.10 DIABETIC KETOACIDOSIS WITHOUT COMA ASSOCIATED WITH TYPE 2 DIABETES MELLITUS: ICD-10-CM
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
## 2025-03-17 LAB
ALBUMIN SERPL-MCNC: 3 G/DL (ref 3.5–5.2)
ALP SERPL-CCNC: 252 U/L (ref 35–104)
ALT SERPL-CCNC: 78 U/L (ref 10–35)
ANION GAP SERPL CALCULATED.3IONS-SCNC: 17 MMOL/L (ref 9–16)
AST SERPL-CCNC: 73 U/L (ref 10–35)
B-OH-BUTYR SERPL-MCNC: 0.11 MMOL/L (ref 0.02–0.27)
BASOPHILS # BLD: 0 K/UL (ref 0–0.2)
BASOPHILS NFR BLD: 1 % (ref 0–2)
BILIRUB SERPL-MCNC: <0.2 MG/DL (ref 0–1.2)
BILIRUB UR QL STRIP: NEGATIVE
BODY TEMPERATURE: 37
BUN SERPL-MCNC: 8 MG/DL (ref 6–20)
CALCIUM SERPL-MCNC: 8.4 MG/DL (ref 8.6–10.4)
CHLORIDE SERPL-SCNC: 107 MMOL/L (ref 98–107)
CLARITY UR: CLEAR
CO2 SERPL-SCNC: 17 MMOL/L (ref 20–31)
COHGB MFR BLD: 2.9 % (ref 0–5)
COLOR UR: YELLOW
COMMENT: ABNORMAL
CREAT SERPL-MCNC: 0.8 MG/DL (ref 0.7–1.2)
EOSINOPHIL # BLD: 0 K/UL (ref 0–0.4)
EOSINOPHILS RELATIVE PERCENT: 0 % (ref 0–4)
ERYTHROCYTE [DISTWIDTH] IN BLOOD BY AUTOMATED COUNT: 18.9 % (ref 11.5–14.9)
GFR, ESTIMATED: >90 ML/MIN/1.73M2
GLUCOSE BLD-MCNC: 537 MG/DL (ref 65–105)
GLUCOSE SERPL-MCNC: 472 MG/DL (ref 74–99)
GLUCOSE UR STRIP-MCNC: ABNORMAL MG/DL
HCO3 VENOUS: 15.1 MMOL/L (ref 24–30)
HCT VFR BLD AUTO: 37.5 % (ref 36–46)
HGB BLD-MCNC: 11.6 G/DL (ref 12–16)
HGB UR QL STRIP.AUTO: NEGATIVE
KETONES UR STRIP-MCNC: NEGATIVE MG/DL
LEUKOCYTE ESTERASE UR QL STRIP: NEGATIVE
LIPASE SERPL-CCNC: <3 U/L (ref 13–60)
LYMPHOCYTES NFR BLD: 0.9 K/UL (ref 1–4.8)
LYMPHOCYTES RELATIVE PERCENT: 22 % (ref 24–44)
MAGNESIUM SERPL-MCNC: 2.1 MG/DL (ref 1.6–2.6)
MCH RBC QN AUTO: 28.2 PG (ref 26–34)
MCHC RBC AUTO-ENTMCNC: 31 G/DL (ref 31–37)
MCV RBC AUTO: 90.8 FL (ref 80–100)
METHEMOGLOBIN: 0.3 % (ref 0–1.9)
MONOCYTES NFR BLD: 0.1 K/UL (ref 0.1–1.3)
MONOCYTES NFR BLD: 3 % (ref 1–7)
NEGATIVE BASE EXCESS, VEN: 10.4 MMOL/L (ref 0–2)
NEUTROPHILS NFR BLD: 74 % (ref 36–66)
NEUTS SEG NFR BLD: 3 K/UL (ref 1.3–9.1)
NITRITE UR QL STRIP: NEGATIVE
O2 SAT, VEN: 59.6 % (ref 60–85)
PCO2 VENOUS: 32.4 MM HG (ref 39–55)
PH UR STRIP: 5.5 [PH] (ref 5–8)
PH VENOUS: 7.29 (ref 7.32–7.42)
PLATELET # BLD AUTO: 258 K/UL (ref 150–450)
PMV BLD AUTO: 10 FL (ref 6–12)
PO2 VENOUS: 36.6 MM HG (ref 30–50)
POTASSIUM SERPL-SCNC: 3.3 MMOL/L (ref 3.7–5.3)
PROT SERPL-MCNC: 6.2 G/DL (ref 6.6–8.7)
PROT UR STRIP-MCNC: NEGATIVE MG/DL
PT. POSITION: ABNORMAL
RBC # BLD AUTO: 4.13 M/UL (ref 4–5.2)
SODIUM SERPL-SCNC: 141 MMOL/L (ref 136–145)
SP GR UR STRIP: 1.01 (ref 1–1.03)
TROPONIN I SERPL HS-MCNC: 20 NG/L (ref 0–14)
UROBILINOGEN UR STRIP-ACNC: NORMAL EU/DL (ref 0–1)
WBC OTHER # BLD: 4 K/UL (ref 3.5–11)

## 2025-03-17 PROCEDURE — 80053 COMPREHEN METABOLIC PANEL: CPT

## 2025-03-17 PROCEDURE — 85025 COMPLETE CBC W/AUTO DIFF WBC: CPT

## 2025-03-17 PROCEDURE — 70450 CT HEAD/BRAIN W/O DYE: CPT

## 2025-03-17 PROCEDURE — 99285 EMERGENCY DEPT VISIT HI MDM: CPT

## 2025-03-17 PROCEDURE — 81003 URINALYSIS AUTO W/O SCOPE: CPT

## 2025-03-17 PROCEDURE — 72125 CT NECK SPINE W/O DYE: CPT

## 2025-03-17 PROCEDURE — 83036 HEMOGLOBIN GLYCOSYLATED A1C: CPT

## 2025-03-17 PROCEDURE — 83690 ASSAY OF LIPASE: CPT

## 2025-03-17 PROCEDURE — 96375 TX/PRO/DX INJ NEW DRUG ADDON: CPT

## 2025-03-17 PROCEDURE — 82947 ASSAY GLUCOSE BLOOD QUANT: CPT

## 2025-03-17 PROCEDURE — 2500000003 HC RX 250 WO HCPCS: Performed by: STUDENT IN AN ORGANIZED HEALTH CARE EDUCATION/TRAINING PROGRAM

## 2025-03-17 PROCEDURE — 2580000003 HC RX 258: Performed by: STUDENT IN AN ORGANIZED HEALTH CARE EDUCATION/TRAINING PROGRAM

## 2025-03-17 PROCEDURE — 96361 HYDRATE IV INFUSION ADD-ON: CPT

## 2025-03-17 PROCEDURE — 74177 CT ABD & PELVIS W/CONTRAST: CPT

## 2025-03-17 PROCEDURE — 36415 COLL VENOUS BLD VENIPUNCTURE: CPT

## 2025-03-17 PROCEDURE — 96374 THER/PROPH/DIAG INJ IV PUSH: CPT

## 2025-03-17 PROCEDURE — 83735 ASSAY OF MAGNESIUM: CPT

## 2025-03-17 PROCEDURE — 82010 KETONE BODYS QUAN: CPT

## 2025-03-17 PROCEDURE — 2580000003 HC RX 258

## 2025-03-17 PROCEDURE — 6360000002 HC RX W HCPCS

## 2025-03-17 PROCEDURE — 82800 BLOOD PH: CPT

## 2025-03-17 PROCEDURE — 6360000004 HC RX CONTRAST MEDICATION: Performed by: STUDENT IN AN ORGANIZED HEALTH CARE EDUCATION/TRAINING PROGRAM

## 2025-03-17 PROCEDURE — 84484 ASSAY OF TROPONIN QUANT: CPT

## 2025-03-17 PROCEDURE — 82805 BLOOD GASES W/O2 SATURATION: CPT

## 2025-03-17 PROCEDURE — 93005 ELECTROCARDIOGRAM TRACING: CPT

## 2025-03-17 RX ORDER — POTASSIUM CHLORIDE 7.45 MG/ML
10 INJECTION INTRAVENOUS PRN
Status: DISCONTINUED | OUTPATIENT
Start: 2025-03-17 | End: 2025-03-18

## 2025-03-17 RX ORDER — SODIUM CHLORIDE 450 MG/100ML
INJECTION, SOLUTION INTRAVENOUS CONTINUOUS
Status: DISCONTINUED | OUTPATIENT
Start: 2025-03-17 | End: 2025-03-18

## 2025-03-17 RX ORDER — 0.9 % SODIUM CHLORIDE 0.9 %
1000 INTRAVENOUS SOLUTION INTRAVENOUS ONCE
Status: COMPLETED | OUTPATIENT
Start: 2025-03-17 | End: 2025-03-18

## 2025-03-17 RX ORDER — METOPROLOL TARTRATE 50 MG
50 TABLET ORAL 2 TIMES DAILY
Qty: 180 TABLET | OUTPATIENT
Start: 2025-03-17

## 2025-03-17 RX ORDER — PANTOPRAZOLE SODIUM 40 MG/1
40 TABLET, DELAYED RELEASE ORAL
Qty: 180 TABLET | OUTPATIENT
Start: 2025-03-17

## 2025-03-17 RX ORDER — FLECAINIDE ACETATE 100 MG/1
100 TABLET ORAL 2 TIMES DAILY
Qty: 180 TABLET | OUTPATIENT
Start: 2025-03-17

## 2025-03-17 RX ORDER — MORPHINE SULFATE 4 MG/ML
4 INJECTION, SOLUTION INTRAMUSCULAR; INTRAVENOUS ONCE
Status: COMPLETED | OUTPATIENT
Start: 2025-03-17 | End: 2025-03-17

## 2025-03-17 RX ORDER — DEXTROSE MONOHYDRATE, SODIUM CHLORIDE, AND POTASSIUM CHLORIDE 50; 1.49; 4.5 G/1000ML; G/1000ML; G/1000ML
INJECTION, SOLUTION INTRAVENOUS CONTINUOUS PRN
Status: DISCONTINUED | OUTPATIENT
Start: 2025-03-17 | End: 2025-03-18

## 2025-03-17 RX ORDER — SODIUM CHLORIDE 0.9 % (FLUSH) 0.9 %
10 SYRINGE (ML) INJECTION PRN
Status: COMPLETED | OUTPATIENT
Start: 2025-03-17 | End: 2025-03-17

## 2025-03-17 RX ORDER — MAGNESIUM SULFATE HEPTAHYDRATE 40 MG/ML
2000 INJECTION, SOLUTION INTRAVENOUS PRN
Status: DISCONTINUED | OUTPATIENT
Start: 2025-03-17 | End: 2025-03-18

## 2025-03-17 RX ORDER — IOPAMIDOL 755 MG/ML
75 INJECTION, SOLUTION INTRAVASCULAR
Status: COMPLETED | OUTPATIENT
Start: 2025-03-17 | End: 2025-03-17

## 2025-03-17 RX ORDER — 0.9 % SODIUM CHLORIDE 0.9 %
100 INTRAVENOUS SOLUTION INTRAVENOUS ONCE
Status: COMPLETED | OUTPATIENT
Start: 2025-03-17 | End: 2025-03-18

## 2025-03-17 RX ORDER — ONDANSETRON 2 MG/ML
4 INJECTION INTRAMUSCULAR; INTRAVENOUS ONCE
Status: COMPLETED | OUTPATIENT
Start: 2025-03-17 | End: 2025-03-17

## 2025-03-17 RX ADMIN — SODIUM CHLORIDE 100 ML: 9 INJECTION, SOLUTION INTRAVENOUS at 23:45

## 2025-03-17 RX ADMIN — ONDANSETRON 4 MG: 2 INJECTION, SOLUTION INTRAMUSCULAR; INTRAVENOUS at 22:35

## 2025-03-17 RX ADMIN — SODIUM CHLORIDE 1000 ML: 0.9 INJECTION, SOLUTION INTRAVENOUS at 22:34

## 2025-03-17 RX ADMIN — SODIUM CHLORIDE, PRESERVATIVE FREE 10 ML: 5 INJECTION INTRAVENOUS at 23:45

## 2025-03-17 RX ADMIN — IOPAMIDOL 75 ML: 755 INJECTION, SOLUTION INTRAVENOUS at 23:45

## 2025-03-17 RX ADMIN — MORPHINE SULFATE 4 MG: 4 INJECTION, SOLUTION INTRAMUSCULAR; INTRAVENOUS at 22:34

## 2025-03-17 ASSESSMENT — PAIN - FUNCTIONAL ASSESSMENT: PAIN_FUNCTIONAL_ASSESSMENT: NONE - DENIES PAIN

## 2025-03-17 NOTE — DISCHARGE SUMMARY
ureteral stones. GI/Bowel: Postsurgical changes near the stomach fundus consistent with the history of gastric bypass.  The distal duodenum/jejunum anastomosis appears normal.  The duodenum appears normal.  The bypassed portion of the stomach appears normal.  There is a large amount of enteric material within the gastric pouch.  The small bowel loops show no evidence of obstruction or inflammation.  The colon shows no wall thickening or inflammatory change. The appendix is not clearly visualized. Pelvis: Urinary bladder is normal.  Status post hysterectomy.  No suspicious adnexal findings. Peritoneum/Retroperitoneum: The aorta is normal caliber.  No lymphadenopathy. An IVC filter is in place. Bones/Soft Tissues: There is a fracture of the lateral aspect of the right 5th rib that appears subacute.     1.  Postsurgical changes of gastric bypass.  No evidence of bowel obstruction.  However, there does appear to be a large amount of enteric material within the gastric pouch which may indicate some element of pouch obstruction.     CT HEAD WO CONTRAST  Result Date: 3/10/2025  EXAMINATION: CT OF THE HEAD WITHOUT CONTRAST  3/10/2025 1:54 pm TECHNIQUE: CT of the head was performed without the administration of intravenous contrast. Automated exposure control, iterative reconstruction, and/or weight based adjustment of the mA/kV was utilized to reduce the radiation dose to as low as reasonably achievable. COMPARISON: CT head performed 12/15/2024. HISTORY: ORDERING SYSTEM PROVIDED HISTORY: fall on ac TECHNOLOGIST PROVIDED HISTORY: fall on ac Is the patient pregnant?->No Reason for Exam: unwitnessed fall FINDINGS: BRAIN/VENTRICLES: There is no acute intracranial hemorrhage, mass effect, or midline shift.  There is satisfactory overall gray-white matter differentiation.  There is cerebral atrophy.  The ventricular structures are symmetric and unremarkable.  The infratentorial structures are unremarkable. ORBITS: The visualized

## 2025-03-17 NOTE — CARE COORDINATION
Care Transitions Note    Initial Call - Call within 2 business days of discharge: Yes    Attempted to reach patient for transitions of care follow up. Unable to reach patient.    Outreach Attempts:   HIPAA compliant voicemail left for patient.     Patient: Krista Chiang    Patient : 1980   MRN: [id number]    Reason for Admission: Dizziness .  Discharge Date: 3/16/25  RURS: Readmission Risk Score: 32.6    Last Discharge Facility       Date Complaint Diagnosis Description Type Department Provider    3/9/25 Fall; Dizziness; Nausea; Leg Pain Dizziness ... ED to Hosp-Admission (Discharged) (ADMITTED) [facility] Carli Szymanski MD; Ko...            Was this an external facility discharge? No    Follow Up Appointment:   Patient does not have a follow up appointment scheduled at time of call.         Plan for follow-up on next business day. 2nd attempt 24 hr HFU call     Kinga Gardiner RN

## 2025-03-18 ENCOUNTER — APPOINTMENT (OUTPATIENT)
Dept: GENERAL RADIOLOGY | Age: 45
DRG: 638 | End: 2025-03-18
Attending: INTERNAL MEDICINE
Payer: COMMERCIAL

## 2025-03-18 VITALS
SYSTOLIC BLOOD PRESSURE: 138 MMHG | HEART RATE: 75 BPM | TEMPERATURE: 99.1 F | WEIGHT: 205 LBS | BODY MASS INDEX: 31.07 KG/M2 | OXYGEN SATURATION: 98 % | HEIGHT: 68 IN | RESPIRATION RATE: 16 BRPM | DIASTOLIC BLOOD PRESSURE: 40 MMHG

## 2025-03-18 PROBLEM — E10.10 DIABETIC KETOACIDOSIS WITHOUT COMA ASSOCIATED WITH TYPE 1 DIABETES MELLITUS: Status: ACTIVE | Noted: 2025-03-18

## 2025-03-18 LAB
ANION GAP SERPL CALCULATED.3IONS-SCNC: 11 MMOL/L (ref 9–16)
ANION GAP SERPL CALCULATED.3IONS-SCNC: 14 MMOL/L (ref 9–16)
BUN SERPL-MCNC: 4 MG/DL (ref 6–20)
BUN SERPL-MCNC: 5 MG/DL (ref 6–20)
CALCIUM SERPL-MCNC: 8 MG/DL (ref 8.6–10.4)
CALCIUM SERPL-MCNC: 8.3 MG/DL (ref 8.6–10.4)
CALPROTECTIN, FECAL: 30 UG/G
CHLORIDE SERPL-SCNC: 110 MMOL/L (ref 98–107)
CHLORIDE SERPL-SCNC: 112 MMOL/L (ref 98–107)
CO2 SERPL-SCNC: 17 MMOL/L (ref 20–31)
CO2 SERPL-SCNC: 21 MMOL/L (ref 20–31)
CREAT SERPL-MCNC: 0.5 MG/DL (ref 0.7–1.2)
CREAT SERPL-MCNC: 0.5 MG/DL (ref 0.7–1.2)
EST. AVERAGE GLUCOSE BLD GHB EST-MCNC: 235 MG/DL
GFR, ESTIMATED: >90 ML/MIN/1.73M2
GFR, ESTIMATED: >90 ML/MIN/1.73M2
GLUCOSE BLD-MCNC: 115 MG/DL (ref 65–105)
GLUCOSE BLD-MCNC: 117 MG/DL (ref 65–105)
GLUCOSE BLD-MCNC: 137 MG/DL (ref 65–105)
GLUCOSE BLD-MCNC: 139 MG/DL (ref 65–105)
GLUCOSE BLD-MCNC: 168 MG/DL (ref 65–105)
GLUCOSE BLD-MCNC: 204 MG/DL (ref 65–105)
GLUCOSE BLD-MCNC: 226 MG/DL (ref 65–105)
GLUCOSE BLD-MCNC: 66 MG/DL (ref 65–105)
GLUCOSE BLD-MCNC: 82 MG/DL (ref 65–105)
GLUCOSE SERPL-MCNC: 112 MG/DL (ref 74–99)
GLUCOSE SERPL-MCNC: 131 MG/DL (ref 74–99)
HBA1C MFR BLD: 9.8 % (ref 4–6)
MAGNESIUM SERPL-MCNC: 1.7 MG/DL (ref 1.6–2.6)
MAGNESIUM SERPL-MCNC: 1.8 MG/DL (ref 1.6–2.6)
PHOSPHATE SERPL-MCNC: 1.9 MG/DL (ref 2.5–4.5)
PHOSPHATE SERPL-MCNC: 3.3 MG/DL (ref 2.5–4.5)
POTASSIUM SERPL-SCNC: 3.1 MMOL/L (ref 3.7–5.3)
POTASSIUM SERPL-SCNC: 3.8 MMOL/L (ref 3.7–5.3)
SODIUM SERPL-SCNC: 142 MMOL/L (ref 136–145)
SODIUM SERPL-SCNC: 143 MMOL/L (ref 136–145)
SURGICAL PATHOLOGY REPORT: NORMAL
TROPONIN I SERPL HS-MCNC: 13 NG/L (ref 0–14)
TROPONIN I SERPL HS-MCNC: 14 NG/L (ref 0–14)

## 2025-03-18 PROCEDURE — 6360000002 HC RX W HCPCS: Performed by: INTERNAL MEDICINE

## 2025-03-18 PROCEDURE — 6360000002 HC RX W HCPCS: Performed by: NURSE PRACTITIONER

## 2025-03-18 PROCEDURE — 6370000000 HC RX 637 (ALT 250 FOR IP)

## 2025-03-18 PROCEDURE — 6360000002 HC RX W HCPCS

## 2025-03-18 PROCEDURE — 2500000003 HC RX 250 WO HCPCS

## 2025-03-18 PROCEDURE — 96366 THER/PROPH/DIAG IV INF ADDON: CPT

## 2025-03-18 PROCEDURE — 96375 TX/PRO/DX INJ NEW DRUG ADDON: CPT

## 2025-03-18 PROCEDURE — G0378 HOSPITAL OBSERVATION PER HR: HCPCS

## 2025-03-18 PROCEDURE — 83735 ASSAY OF MAGNESIUM: CPT

## 2025-03-18 PROCEDURE — 96374 THER/PROPH/DIAG INJ IV PUSH: CPT

## 2025-03-18 PROCEDURE — 82947 ASSAY GLUCOSE BLOOD QUANT: CPT

## 2025-03-18 PROCEDURE — 96372 THER/PROPH/DIAG INJ SC/IM: CPT

## 2025-03-18 PROCEDURE — 96361 HYDRATE IV INFUSION ADD-ON: CPT

## 2025-03-18 PROCEDURE — 6370000000 HC RX 637 (ALT 250 FOR IP): Performed by: NURSE PRACTITIONER

## 2025-03-18 PROCEDURE — 73502 X-RAY EXAM HIP UNI 2-3 VIEWS: CPT

## 2025-03-18 PROCEDURE — 84100 ASSAY OF PHOSPHORUS: CPT

## 2025-03-18 PROCEDURE — 2000000000 HC ICU R&B

## 2025-03-18 PROCEDURE — 96376 TX/PRO/DX INJ SAME DRUG ADON: CPT

## 2025-03-18 PROCEDURE — 2580000003 HC RX 258

## 2025-03-18 PROCEDURE — 96365 THER/PROPH/DIAG IV INF INIT: CPT

## 2025-03-18 PROCEDURE — 99223 1ST HOSP IP/OBS HIGH 75: CPT | Performed by: INTERNAL MEDICINE

## 2025-03-18 PROCEDURE — 36415 COLL VENOUS BLD VENIPUNCTURE: CPT

## 2025-03-18 PROCEDURE — 80048 BASIC METABOLIC PNL TOTAL CA: CPT

## 2025-03-18 PROCEDURE — 6360000002 HC RX W HCPCS: Performed by: STUDENT IN AN ORGANIZED HEALTH CARE EDUCATION/TRAINING PROGRAM

## 2025-03-18 PROCEDURE — 94761 N-INVAS EAR/PLS OXIMETRY MLT: CPT

## 2025-03-18 PROCEDURE — 2500000003 HC RX 250 WO HCPCS: Performed by: NURSE PRACTITIONER

## 2025-03-18 RX ORDER — SODIUM CHLORIDE 0.9 % (FLUSH) 0.9 %
10 SYRINGE (ML) INJECTION PRN
Status: DISCONTINUED | OUTPATIENT
Start: 2025-03-18 | End: 2025-03-18 | Stop reason: HOSPADM

## 2025-03-18 RX ORDER — BISACODYL 10 MG
10 SUPPOSITORY, RECTAL RECTAL DAILY PRN
Status: DISCONTINUED | OUTPATIENT
Start: 2025-03-18 | End: 2025-03-18 | Stop reason: HOSPADM

## 2025-03-18 RX ORDER — POLYETHYLENE GLYCOL 3350 17 G/17G
17 POWDER, FOR SOLUTION ORAL DAILY PRN
Status: DISCONTINUED | OUTPATIENT
Start: 2025-03-18 | End: 2025-03-18 | Stop reason: HOSPADM

## 2025-03-18 RX ORDER — SUCRALFATE 1 G/1
1 TABLET ORAL
Status: DISCONTINUED | OUTPATIENT
Start: 2025-03-18 | End: 2025-03-18 | Stop reason: HOSPADM

## 2025-03-18 RX ORDER — ACETAMINOPHEN 325 MG/1
650 TABLET ORAL EVERY 6 HOURS PRN
Status: DISCONTINUED | OUTPATIENT
Start: 2025-03-18 | End: 2025-03-18 | Stop reason: HOSPADM

## 2025-03-18 RX ORDER — SODIUM CHLORIDE 9 MG/ML
INJECTION, SOLUTION INTRAVENOUS PRN
Status: DISCONTINUED | OUTPATIENT
Start: 2025-03-18 | End: 2025-03-18 | Stop reason: HOSPADM

## 2025-03-18 RX ORDER — DEXTROSE MONOHYDRATE 100 MG/ML
INJECTION, SOLUTION INTRAVENOUS CONTINUOUS PRN
Status: DISCONTINUED | OUTPATIENT
Start: 2025-03-18 | End: 2025-03-18 | Stop reason: HOSPADM

## 2025-03-18 RX ORDER — PRAVASTATIN SODIUM 40 MG
40 TABLET ORAL NIGHTLY
Status: DISCONTINUED | OUTPATIENT
Start: 2025-03-18 | End: 2025-03-18 | Stop reason: HOSPADM

## 2025-03-18 RX ORDER — MORPHINE SULFATE 10 MG/ML
8 INJECTION, SOLUTION INTRAMUSCULAR; INTRAVENOUS ONCE
Status: COMPLETED | OUTPATIENT
Start: 2025-03-18 | End: 2025-03-18

## 2025-03-18 RX ORDER — METOPROLOL TARTRATE 50 MG
50 TABLET ORAL 2 TIMES DAILY
Status: DISCONTINUED | OUTPATIENT
Start: 2025-03-18 | End: 2025-03-18 | Stop reason: HOSPADM

## 2025-03-18 RX ORDER — ONDANSETRON 2 MG/ML
4 INJECTION INTRAMUSCULAR; INTRAVENOUS ONCE
Status: COMPLETED | OUTPATIENT
Start: 2025-03-18 | End: 2025-03-18

## 2025-03-18 RX ORDER — INSULIN LISPRO 100 [IU]/ML
0-8 INJECTION, SOLUTION INTRAVENOUS; SUBCUTANEOUS
Status: DISCONTINUED | OUTPATIENT
Start: 2025-03-18 | End: 2025-03-18 | Stop reason: HOSPADM

## 2025-03-18 RX ORDER — LORAZEPAM 1 MG/1
0.5 TABLET ORAL EVERY 8 HOURS PRN
Status: DISCONTINUED | OUTPATIENT
Start: 2025-03-18 | End: 2025-03-18 | Stop reason: HOSPADM

## 2025-03-18 RX ORDER — POTASSIUM CHLORIDE 7.45 MG/ML
10 INJECTION INTRAVENOUS PRN
Status: DISCONTINUED | OUTPATIENT
Start: 2025-03-18 | End: 2025-03-18 | Stop reason: HOSPADM

## 2025-03-18 RX ORDER — LACTOBACILLUS RHAMNOSUS GG 10B CELL
1 CAPSULE ORAL 2 TIMES DAILY WITH MEALS
Status: DISCONTINUED | OUTPATIENT
Start: 2025-03-18 | End: 2025-03-18 | Stop reason: HOSPADM

## 2025-03-18 RX ORDER — BUPROPION HYDROCHLORIDE 150 MG/1
150 TABLET ORAL EVERY MORNING
Status: DISCONTINUED | OUTPATIENT
Start: 2025-03-18 | End: 2025-03-18 | Stop reason: HOSPADM

## 2025-03-18 RX ORDER — PANTOPRAZOLE SODIUM 40 MG/1
40 TABLET, DELAYED RELEASE ORAL
Status: DISCONTINUED | OUTPATIENT
Start: 2025-03-18 | End: 2025-03-18 | Stop reason: HOSPADM

## 2025-03-18 RX ORDER — FONDAPARINUX SODIUM 7.5 MG/.6ML
7.5 INJECTION SUBCUTANEOUS DAILY
Status: DISCONTINUED | OUTPATIENT
Start: 2025-03-19 | End: 2025-03-18 | Stop reason: HOSPADM

## 2025-03-18 RX ORDER — ACETAMINOPHEN 650 MG/1
650 SUPPOSITORY RECTAL EVERY 6 HOURS PRN
Status: DISCONTINUED | OUTPATIENT
Start: 2025-03-18 | End: 2025-03-18 | Stop reason: HOSPADM

## 2025-03-18 RX ORDER — FONDAPARINUX SODIUM 7.5 MG/.6ML
7.5 INJECTION SUBCUTANEOUS DAILY
Status: DISCONTINUED | OUTPATIENT
Start: 2025-03-18 | End: 2025-03-18

## 2025-03-18 RX ORDER — HYDRALAZINE HYDROCHLORIDE 20 MG/ML
10 INJECTION INTRAMUSCULAR; INTRAVENOUS ONCE
Status: COMPLETED | OUTPATIENT
Start: 2025-03-18 | End: 2025-03-18

## 2025-03-18 RX ORDER — SODIUM CHLORIDE 0.9 % (FLUSH) 0.9 %
5-40 SYRINGE (ML) INJECTION EVERY 12 HOURS SCHEDULED
Status: DISCONTINUED | OUTPATIENT
Start: 2025-03-18 | End: 2025-03-18 | Stop reason: HOSPADM

## 2025-03-18 RX ORDER — INSULIN GLARGINE 100 [IU]/ML
10 INJECTION, SOLUTION SUBCUTANEOUS DAILY
Status: DISCONTINUED | OUTPATIENT
Start: 2025-03-18 | End: 2025-03-18 | Stop reason: HOSPADM

## 2025-03-18 RX ORDER — MAGNESIUM SULFATE HEPTAHYDRATE 40 MG/ML
2000 INJECTION, SOLUTION INTRAVENOUS PRN
Status: DISCONTINUED | OUTPATIENT
Start: 2025-03-18 | End: 2025-03-18 | Stop reason: HOSPADM

## 2025-03-18 RX ORDER — DICYCLOMINE HYDROCHLORIDE 10 MG/1
10 CAPSULE ORAL
Status: DISCONTINUED | OUTPATIENT
Start: 2025-03-18 | End: 2025-03-18 | Stop reason: HOSPADM

## 2025-03-18 RX ORDER — SCOPOLAMINE 1 MG/3D
1 PATCH, EXTENDED RELEASE TRANSDERMAL
Qty: 6 PATCH | Refills: 0 | Status: ON HOLD | OUTPATIENT
Start: 2025-03-18 | End: 2025-04-05

## 2025-03-18 RX ORDER — M-VIT,TX,IRON,MINS/CALC/FOLIC 27MG-0.4MG
1 TABLET ORAL DAILY
Status: DISCONTINUED | OUTPATIENT
Start: 2025-03-18 | End: 2025-03-18 | Stop reason: HOSPADM

## 2025-03-18 RX ORDER — OXYCODONE AND ACETAMINOPHEN 5; 325 MG/1; MG/1
1 TABLET ORAL EVERY 8 HOURS PRN
Status: DISCONTINUED | OUTPATIENT
Start: 2025-03-18 | End: 2025-03-18 | Stop reason: HOSPADM

## 2025-03-18 RX ORDER — MORPHINE SULFATE 4 MG/ML
4 INJECTION, SOLUTION INTRAMUSCULAR; INTRAVENOUS EVERY 4 HOURS PRN
Status: DISCONTINUED | OUTPATIENT
Start: 2025-03-18 | End: 2025-03-18

## 2025-03-18 RX ORDER — POTASSIUM CHLORIDE 1500 MG/1
40 TABLET, EXTENDED RELEASE ORAL PRN
Status: DISCONTINUED | OUTPATIENT
Start: 2025-03-18 | End: 2025-03-18 | Stop reason: HOSPADM

## 2025-03-18 RX ORDER — MORPHINE SULFATE 4 MG/ML
4 INJECTION, SOLUTION INTRAMUSCULAR; INTRAVENOUS EVERY 4 HOURS PRN
Status: COMPLETED | OUTPATIENT
Start: 2025-03-18 | End: 2025-03-18

## 2025-03-18 RX ORDER — INSULIN GLARGINE 100 [IU]/ML
10 INJECTION, SOLUTION SUBCUTANEOUS DAILY
Qty: 10 ML | Refills: 3 | Status: ON HOLD | OUTPATIENT
Start: 2025-03-19

## 2025-03-18 RX ORDER — CLOBETASOL PROPIONATE 0.5 MG/G
CREAM TOPICAL DAILY
Status: DISCONTINUED | OUTPATIENT
Start: 2025-03-18 | End: 2025-03-18 | Stop reason: HOSPADM

## 2025-03-18 RX ORDER — FLECAINIDE ACETATE 100 MG/1
100 TABLET ORAL 2 TIMES DAILY
Status: DISCONTINUED | OUTPATIENT
Start: 2025-03-18 | End: 2025-03-18 | Stop reason: HOSPADM

## 2025-03-18 RX ORDER — CHOLESTYRAMINE LIGHT 4 G/5.7G
1 POWDER, FOR SUSPENSION ORAL DAILY
Status: DISCONTINUED | OUTPATIENT
Start: 2025-03-18 | End: 2025-03-18 | Stop reason: HOSPADM

## 2025-03-18 RX ADMIN — POTASSIUM BICARBONATE 40 MEQ: 782 TABLET, EFFERVESCENT ORAL at 00:44

## 2025-03-18 RX ADMIN — POTASSIUM CHLORIDE 10 MEQ: 7.46 INJECTION, SOLUTION INTRAVENOUS at 00:51

## 2025-03-18 RX ADMIN — SODIUM CHLORIDE 8.1 UNITS/HR: 9 INJECTION, SOLUTION INTRAVENOUS at 00:53

## 2025-03-18 RX ADMIN — SUCRALFATE 1 G: 1 TABLET ORAL at 07:05

## 2025-03-18 RX ADMIN — MORPHINE SULFATE 8 MG: 10 INJECTION, SOLUTION INTRAMUSCULAR; INTRAVENOUS at 01:20

## 2025-03-18 RX ADMIN — PANTOPRAZOLE SODIUM 40 MG: 40 TABLET, DELAYED RELEASE ORAL at 07:06

## 2025-03-18 RX ADMIN — PANTOPRAZOLE SODIUM 40 MG: 40 TABLET, DELAYED RELEASE ORAL at 16:35

## 2025-03-18 RX ADMIN — MORPHINE SULFATE 4 MG: 4 INJECTION, SOLUTION INTRAMUSCULAR; INTRAVENOUS at 04:55

## 2025-03-18 RX ADMIN — CHOLESTYRAMINE 4 G: 4 POWDER, FOR SUSPENSION ORAL at 08:19

## 2025-03-18 RX ADMIN — Medication 1 CAPSULE: at 16:35

## 2025-03-18 RX ADMIN — MORPHINE SULFATE 4 MG: 4 INJECTION, SOLUTION INTRAMUSCULAR; INTRAVENOUS at 09:42

## 2025-03-18 RX ADMIN — CLOBETASOL PROPIONATE CREAM USP, 0.05%: 0.5 CREAM TOPICAL at 13:58

## 2025-03-18 RX ADMIN — POTASSIUM CHLORIDE 10 MEQ: 7.46 INJECTION, SOLUTION INTRAVENOUS at 02:01

## 2025-03-18 RX ADMIN — SODIUM CHLORIDE, PRESERVATIVE FREE 10 ML: 5 INJECTION INTRAVENOUS at 08:19

## 2025-03-18 RX ADMIN — FLECAINIDE ACETATE 100 MG: 100 TABLET ORAL at 08:22

## 2025-03-18 RX ADMIN — FLUOXETINE HYDROCHLORIDE 40 MG: 20 CAPSULE ORAL at 08:19

## 2025-03-18 RX ADMIN — DICYCLOMINE HYDROCHLORIDE 10 MG: 10 CAPSULE ORAL at 07:07

## 2025-03-18 RX ADMIN — SUCRALFATE 1 G: 1 TABLET ORAL at 12:20

## 2025-03-18 RX ADMIN — POTASSIUM CHLORIDE, DEXTROSE MONOHYDRATE AND SODIUM CHLORIDE: 150; 5; 450 INJECTION, SOLUTION INTRAVENOUS at 02:44

## 2025-03-18 RX ADMIN — SODIUM CHLORIDE: 4.5 INJECTION, SOLUTION INTRAVENOUS at 00:46

## 2025-03-18 RX ADMIN — OXYCODONE HYDROCHLORIDE AND ACETAMINOPHEN 1 TABLET: 5; 325 TABLET ORAL at 08:19

## 2025-03-18 RX ADMIN — METOPROLOL TARTRATE 50 MG: 50 TABLET, FILM COATED ORAL at 08:19

## 2025-03-18 RX ADMIN — BUPROPION HYDROCHLORIDE 150 MG: 150 TABLET, EXTENDED RELEASE ORAL at 08:19

## 2025-03-18 RX ADMIN — COLLAGENASE SANTYL: 250 OINTMENT TOPICAL at 13:59

## 2025-03-18 RX ADMIN — DICYCLOMINE HYDROCHLORIDE 10 MG: 10 CAPSULE ORAL at 16:35

## 2025-03-18 RX ADMIN — Medication 1 TABLET: at 08:19

## 2025-03-18 RX ADMIN — SUCRALFATE 1 G: 1 TABLET ORAL at 16:35

## 2025-03-18 RX ADMIN — HYDRALAZINE HYDROCHLORIDE 10 MG: 20 INJECTION INTRAMUSCULAR; INTRAVENOUS at 16:34

## 2025-03-18 RX ADMIN — ONDANSETRON 4 MG: 2 INJECTION, SOLUTION INTRAMUSCULAR; INTRAVENOUS at 04:36

## 2025-03-18 RX ADMIN — DICYCLOMINE HYDROCHLORIDE 10 MG: 10 CAPSULE ORAL at 12:20

## 2025-03-18 RX ADMIN — OXYCODONE HYDROCHLORIDE AND ACETAMINOPHEN 1 TABLET: 5; 325 TABLET ORAL at 16:35

## 2025-03-18 RX ADMIN — FONDAPARINUX SODIUM 7.5 MG: 7.5 INJECTION, SOLUTION SUBCUTANEOUS at 08:23

## 2025-03-18 RX ADMIN — Medication 1 CAPSULE: at 08:19

## 2025-03-18 ASSESSMENT — ENCOUNTER SYMPTOMS
BACK PAIN: 0
ABDOMINAL PAIN: 0
SHORTNESS OF BREATH: 0
COUGH: 0
DIARRHEA: 0
VOMITING: 0
NAUSEA: 0

## 2025-03-18 ASSESSMENT — PAIN DESCRIPTION - DESCRIPTORS
DESCRIPTORS: BURNING;SHARP
DESCRIPTORS: SHARP;BURNING
DESCRIPTORS: BURNING;SHARP
DESCRIPTORS: BURNING;SHARP

## 2025-03-18 ASSESSMENT — PAIN DESCRIPTION - LOCATION
LOCATION: HIP
LOCATION: HIP;LEG
LOCATION: HIP
LOCATION: HIP

## 2025-03-18 ASSESSMENT — PAIN SCALES - GENERAL
PAINLEVEL_OUTOF10: 7
PAINLEVEL_OUTOF10: 7
PAINLEVEL_OUTOF10: 8
PAINLEVEL_OUTOF10: 8
PAINLEVEL_OUTOF10: 5

## 2025-03-18 ASSESSMENT — PAIN DESCRIPTION - ORIENTATION
ORIENTATION: LEFT

## 2025-03-18 NOTE — PROGRESS NOTES
Physical Therapy        Physical Therapy Cancel Note      DATE: 3/18/2025    NAME: Krista Chiang  MRN: [id number]   : 1980      Patient not seen this date for Physical Therapy due to:    Patient Declined: stating she is going home in 1-2 hours      Electronically signed by Gosia Eldridge PT on 3/18/2025 at 3:10 PM

## 2025-03-18 NOTE — PROGRESS NOTES
Patient transported by wheelchair to front of hospital for discharge. Patient assisted into mother's vehicle. Patient in no distress at this time.

## 2025-03-18 NOTE — PROGRESS NOTES
Dr. Estrada updated on results of patient's L hip XR and most recent blood pressure. Per Dr. Estrada, patient okay for discharge.

## 2025-03-18 NOTE — PROGRESS NOTES
Spiritual Health History and Assessment/Progress Note  [facility]    (P) Loneliness/Social Isolation,  ,  ,      Name: Krista Chiang MRN: [id number]    Age: 44 y.o.     Sex: female   Language: English   [employer]: [employer]   Diabetic ketoacidosis without coma associated with type 1 diabetes mellitus (HCC)     Date: 3/18/2025            Total Time Calculated: (P) 49 min              Patient was in a good and talkative mood but also dealing with some loneliness. Patient spoke of her close relationship with her  and daughter and her hopes to get to go home soon since this hospital stay was a surprise to her after recently being discharged.     Patient is overall in good spirits and thankful for the interaction.     Spiritual Assessment began in [facility] ICU        Referral/Consult From: (P) Rounding   Encounter Overview/Reason: (P) Loneliness/Social Isolation  Service Provided For: (P) Patient    Amy, Belief, Meaning:   Patient identifies as spiritual  Family/Friends No family/friends present      Importance and Influence:  Patient has no beliefs influential to healthcare decision-making identified during this visit  Family/Friends No family/friends present    Community:  Patient is connected with a spiritual community and feels well-supported. Support system includes: Spouse/Partner and Children  Family/Friends No family/friends present    Assessment and Plan of Care:     Patient Interventions include: Facilitated expression of thoughts and feelings and Explored spiritual coping/struggle/distress  Family/Friends Interventions include: No family/friends present    Patient Plan of Care: Spiritual Care available upon further referral  Family/Friends Plan of Care: No family/friends present    Electronically signed by Chaplain Lucius on 3/18/2025 at 12:41 PM

## 2025-03-18 NOTE — PROGRESS NOTES
Patient admitted to room 2015. Attached to telemetry monitor. Assisted to bathroom and to new bed. Oriented to new room. All safety precautions in place, Call light within reach. Assessment to follow.

## 2025-03-18 NOTE — ED TRIAGE NOTES
Mode of arrival (squad #, walk in, police, etc) : [facility]        Chief complaint(s): Fall, hyperglycemia        Arrival Note (brief scenario, treatment PTA, etc).: Pt c/o the above symptoms today. Pt c/o hip pain from the fall today. Pt was just discharged from this facility yesterday and has been noncompliant with her diabetic medications at home. Pt denies chest pain and shortness of breath at this time. Pt A&Ox4.        C= \"Have you ever felt that you should Cut down on your drinking?\"  No  A= \"Have people Annoyed you by criticizing your drinking?\"  No  G= \"Have you ever felt bad or Guilty about your drinking?\"  No  E= \"Have you ever had a drink as an Eye-opener first thing in the morning to steady your nerves or to help a hangover?\"  No      Deferred []      Reason for deferring: N/A    *If yes to two or more: probable alcohol abuse.*

## 2025-03-18 NOTE — PROGRESS NOTES
[facility]   OCCUPATIONAL THERAPY MISSED TREATMENT NOTE   INPATIENT   Date: 3/18/25  Patient Name: Krista Chiang       Room:   MRN: [id number]   Account #: [id number]    : 1980  (44 y.o.)  Gender: female                 REASON FOR MISSED TREATMENT:  1549: Pt in bed upon arrival. Writer introduces self and pt states \"I'm discharging soon, I'm just waiting for my mom to get here.\"     Electronically signed by KATHLEEN Ramirez on 3/18/25 at 4:11 PM EDT

## 2025-03-18 NOTE — H&P
[facility]   IN-PATIENT SERVICE   [facility]    HISTORY AND PHYSICAL EXAMINATION            Date:   3/18/2025  Patient name:  Krista Chiang  Date of admission:  3/17/2025  9:33 PM  MRN:   [id number]  Account:  [id number]  YOB: 1980  PCP:    Arielle Melton APRN - NP  Room:   2015/2015-01  Code Status:    Full Code    Chief Complaint:     Chief Complaint   Patient presents with    Fall    Hyperglycemia       History Obtained From:     patient    History of Present Illness:     The patient is a 44 y.o.  Non- / non  female who presents with Fall and Hyperglycemia   and she is admitted to the hospital for the management of      Krista Chiang is a 44 y.o. Non- / non  female who presents with Fall and Hyperglycemia   and is admitted to the hospital for the management of Diabetic ketoacidosis without coma associated with type 1 diabetes mellitus (HCC). Medical history significant for diabetes mellitus uncontrolled (A1C 10.4 on 3/11/25), hx of PE/DVT, antiphospholipid syndrome on anticoagulation, abdul's esophagus, hx of gastric bypass, S/p hip arthroplasty with infection requiring removal of hardware, chronic pain, alcohol abuse and bipolar disorder.     Patient was just admitted 3/10-3/16 for DKA and SVT requiring amiodarone gtt, transitioned to flecainide with plan for outpatient EP study. Patient also struggled with persistent intractable nausea and vomiting, EGD completed with biopsy, concern for candidal esophagitis.      Returned to ED per EMS after being discharged previous day. Reports falling at home with increased hip pain. States that she was returning from the bathroom when she fell on her right hip and arm. She has chronic hip pain after having multiple surgeries for hip fracture. CT head and cervical spine negative for acute abnormality. Patient is again in DKA, Glucose 537, anion gap 17, Beta-H 0.11. VBG pH 7.28, pCO2 32.4, HCO3  effort  Cardiovascular: normal rate, regular rhythm, no murmur, gallop, rub.  Abdomen: Positive abdominal tenderness neurologic: There are no new focal motor or sensory deficits, normal muscle tone and bulk, no abnormal sensation, normal speech, cranial nerves II through XII grossly intact  Skin: No gross lesions, rashes, bruising or bleeding on exposed skin area  Extremities:  peripheral pulses palpable, no pedal edema or calf pain with palpation      Investigations:      Laboratory Testing:  Recent Results (from the past 24 hours)   POC Glucose Fingerstick    Collection Time: 03/17/25  9:40 PM   Result Value Ref Range    POC Glucose 537 (HH) 65 - 105 mg/dL   Comprehensive Metabolic Panel w/ Reflex to MG    Collection Time: 03/17/25 10:29 PM   Result Value Ref Range    Sodium 141 136 - 145 mmol/L    Potassium 3.3 (L) 3.7 - 5.3 mmol/L    Chloride 107 98 - 107 mmol/L    CO2 17 (L) 20 - 31 mmol/L    Anion Gap 17 (H) 9 - 16 mmol/L    Glucose 472 (HH) 74 - 99 mg/dL    BUN 8 6 - 20 mg/dL    Creatinine 0.8 0.7 - 1.2 mg/dL    Est, Glom Filt Rate >90 >60 mL/min/1.73m2    Calcium 8.4 (L) 8.6 - 10.4 mg/dL    Total Protein 6.2 (L) 6.6 - 8.7 g/dL    Albumin 3.0 (L) 3.5 - 5.2 g/dL    Total Bilirubin <0.2 0.0 - 1.2 mg/dL    Alkaline Phosphatase 252 (H) 35 - 104 U/L    ALT 78 (H) 10 - 35 U/L    AST 73 (H) 10 - 35 U/L   Lipase    Collection Time: 03/17/25 10:29 PM   Result Value Ref Range    Lipase <3 (L) 13 - 60 U/L   Beta-Hydroxybutyrate    Collection Time: 03/17/25 10:29 PM   Result Value Ref Range    Beta-Hydroxybutyrate 0.11 0.02 - 0.27 mmol/L   Troponin Now and Q 1 Hour    Collection Time: 03/17/25 10:29 PM   Result Value Ref Range    Troponin, High Sensitivity 20 (H) 0 - 14 ng/L   Magnesium    Collection Time: 03/17/25 10:29 PM   Result Value Ref Range    Magnesium 2.1 1.6 - 2.6 mg/dL   Urinalysis with Reflex to Culture    Collection Time: 03/17/25 10:41 PM    Specimen: Urine   Result Value Ref Range    Color, UA Yellow

## 2025-03-18 NOTE — ED PROVIDER NOTES
EMERGENCY DEPARTMENT ENCOUNTER   ATTENDING ATTESTATION     Pt Name: Krista Chiang  MRN: [id number]  Birthdate 1980  Date of evaluation: 3/17/25       Krista Chiang is a 44 y.o. female who presents with Fall and Hyperglycemia    Patient with multiple previous visits in the past    Issues with DKA    Presenting after she felt lightheaded and fell    Blood sugar reading elevated    Will obtain labs to evaluate for DKA CT head C-spine and abdomen    MDM:     Patient has evidence of mild DKA    Otherwise reassuring workup    Will admit to ICU for DKA    Due to the immediate potential for life-threatening deterioration due to dka , I spent 38 minutes providing critical care.  This time is excluding time spent performing procedures.     Vitals:   Vitals:    03/17/25 2135 03/17/25 2300   BP: 128/77 (!) 115/56   Pulse: 67    Resp: 18    Temp: 97.7 °F (36.5 °C)    TempSrc: Oral    SpO2: 98% 97%   Weight: 93 kg (205 lb)    Height: 1.727 m (5' 8\")          I personally saw and examined the patient. I have reviewed and agree with the resident's findings, including all diagnostic interpretations and treatment plan as written. I was present for the key portions of any procedures performed and the inclusive time noted for any critical care statement.    Skinny Haines MD  Attending Emergency Physician            Skinny Haines MD  03/18/25 0106

## 2025-03-18 NOTE — CARE COORDINATION
level: Assistance with the following:, Bathing, Dressing, Toileting, Cooking, Housework, Shopping, Mobility     PT AM-PAC:   /24  OT AM-PAC:   /24     Family can provide assistance at DC: No  Would you like Case Management to discuss the discharge plan with any other family members/significant others, and if so, who? No  Plans to Return to Present Housing: Yes  Other Identified Issues/Barriers to RETURNING to current housing: falls, mobility, wounds  Potential Assistance needed at discharge: Skilled Nursing Facility            Potential DME:    Patient expects to discharge to: Trailer/mobile home  Plan for transportation at discharge: Family     Financial     Payor: [employer] [facility] DUAL / Plan: [employer] [facility] DUAL / Product Type: *No Product type* /      Does insurance require precert for SNF: Yes     Potential assistance Purchasing Medications: No  Meds-to-Beds request:          [id number] [place], [place] [facility] [address] [phone] - F [phone]  [address] [place] [id number]  Phone: [phone] Fax: [phone]        Notes:     Factors facilitating achievement of predicted outcomes: Cooperative and Pleasant     Barriers to discharge: Limited family support and Limited insight into deficits     Additional Case Management Notes: 3/18/25 [employer]. Pt from mobile [place], alone.  DME Walker, WC, SC, BSC, Glucometer, GB. [facility] [employer], current.  HONG NEEDS SIGNED/COMPLETED. //tv         The Plan for Transition of Care is related to the following treatment goals of Fall, initial encounter [W19.XXXA]  Diabetic ketoacidosis without coma associated with type 1 diabetes mellitus (HCC) [E10.10]  Diabetic ketoacidosis without coma associated with type 2 diabetes mellitus (HCC) [E11.10]    IF APPLICABLE: The Patient and/or patient representative Krista and her family were provided with a choice of provider and agrees with the discharge plan. Freedom of choice  list with basic dialogue that supports the patient's individualized plan of care/goals and shares the quality data associated with the providers was provided to: Patient   Patient Representative Name:       The Patient and/or Patient Representative Agree with the Discharge Plan? Yes    Adamaris Villatoro  Case Management Department  Ph:  Fax:

## 2025-03-18 NOTE — PROGRESS NOTES
[facility] Internal Medicine  Basil Servin MD; Gigi Morales MD; Raul Heard MD; MD Madeline Jacobson MD; Wilner Ash MD  [facility]   IN-PATIENT SERVICE  [facility]                 Date:   3/18/2025  Patientname:  Krista Chiang  Date of admission:  3/17/2025  9:33 PM  MRN:   [id number]  Account:  [id number]  YOB: 1980  PCP:    Arielle Melton APRN - NP  Room:   16/16  Code Status:    Full Code      Chief Complaint:     Chief Complaint   Patient presents with    Fall    Hyperglycemia       History of Present Illness:     Krista Chiang is a 44 y.o. Non- / non  female who presents with Fall and Hyperglycemia   and is admitted to the hospital for the management of Diabetic ketoacidosis without coma associated with type 1 diabetes mellitus (HCC). Medical history significant for diabetes mellitus uncontrolled (A1C 10.4 on 3/11/25), hx of PE/DVT, antiphospholipid syndrome on anticoagulation, abdul's esophagus, hx of gastric bypass, S/p hip arthroplasty with infection requiring removal of hardware, chronic pain, alcohol abuse and bipolar disorder.    Patient was just admitted 3/10-3/16 for DKA and SVT requiring amiodarone gtt, transitioned to flecainide with plan for outpatient EP study. Patient also struggled with persistent intractable nausea and vomiting, EGD completed with biopsy, concern for candidal esophagitis.     Returned to ED per EMS after being discharged previous day. Reports falling at home with increased hip pain. States that she was returning from the bathroom when she fell on her right hip and arm. She has chronic hip pain after having multiple surgeries for hip fracture. CT head and cervical spine negative for acute abnormality. Patient is again in DKA, Glucose 537, anion gap 17, Beta-H 0.11. VBG pH 7.28, pCO2 32.4, HCO3 15.1. Urinalysis shows large glucose. Started on Insulin gtt. EKG normal  sinus rhythm, non-specific T-wave changes. Trop 20, denies chest pain or SOB. Chronic LFT elevation with hx of alcoholism. AlK Phos 252, ALT 78, AST 73. Lipase <3. CT abdomen pelvis    Denies fever, chills, chest pain, cough and urinary symptoms.    Initial plan was to admit patient to ICU for DKA on insulin gtt, While boarding in ED waiting for ICU bed at 0430 glucose level of 66, Insulin gtt was turned off (was only infusing at 1.4 units/hr)  and patient was given orange juice. Repeat glucose 115. Last Anion gap 14. If glucose level remains stable will change level of care to progressive unit.     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 encounter     left    Constipation 09/03/2019    Depression 07/12/2015    Fibromyalgia     Genital herpes, unspecified     GERD (gastroesophageal reflux disease) 2021    H/O bariatric surgery 2013    Awa and Y    History of pulmonary embolism     Hx of blood clots     clots in both legs and lungs     Hypertension 2012    Lives in nursing home 08/26/2023    [facility] :  # [phone] , fax # [phone]    Lumbosacral spondylosis without myelopathy     MDRO (multiple drug resistant organisms) resistance 2010    MRSA (abd)    Mobility impaired     wheelchair, uses a walker and pivots on right foot    MRSA (methicillin resistant staph aureus) culture positive 02/10/2017    urine    Muscle weakness     Obsessive-compulsive disorder, unspecified     Opioid abuse, in remission (HCC)     Pernicious anemia     Polyneuropathy, unspecified     PTSD (post-traumatic stress disorder)     Pulmonary embolism (HCC) 2010

## 2025-03-18 NOTE — PLAN OF CARE
Problem: Chronic Conditions and Co-morbidities  Goal: Patient's chronic conditions and co-morbidity symptoms are monitored and maintained or improved  Outcome: Progressing  Flowsheets (Taken 3/18/2025 0815)  Care Plan - Patient's Chronic Conditions and Co-Morbidity Symptoms are Monitored and Maintained or Improved:   Monitor and assess patient's chronic conditions and comorbid symptoms for stability, deterioration, or improvement   Collaborate with multidisciplinary team to address chronic and comorbid conditions and prevent exacerbation or deterioration   Update acute care plan with appropriate goals if chronic or comorbid symptoms are exacerbated and prevent overall improvement and discharge     Problem: Discharge Planning  Goal: Discharge to home or other facility with appropriate resources  Outcome: Progressing  Flowsheets (Taken 3/18/2025 0815)  Discharge to home or other facility with appropriate resources:   Identify barriers to discharge with patient and caregiver   Arrange for needed discharge resources and transportation as appropriate   Identify discharge learning needs (meds, wound care, etc)   Arrange for interpreters to assist at discharge as needed   Refer to discharge planning if patient needs post-hospital services based on physician order or complex needs related to functional status, cognitive ability or social support system     Problem: Safety - Adult  Goal: Free from fall injury  Outcome: Progressing     Problem: ABCDS Injury Assessment  Goal: Absence of physical injury  Outcome: Progressing     Problem: Pain  Goal: Verbalizes/displays adequate comfort level or baseline comfort level  Outcome: Progressing  Flowsheets (Taken 3/18/2025 0811)  Verbalizes/displays adequate comfort level or baseline comfort level:   Encourage patient to monitor pain and request assistance   Assess pain using appropriate pain scale   Administer analgesics based on type and severity of pain and evaluate response

## 2025-03-18 NOTE — CONSULTS
Pulmonary/CCM      Patient admitted once again in DKA but now has been out of DKA so I do not think we need to see her, not critically ill.  Discussed with Dr. Estrada

## 2025-03-18 NOTE — DISCHARGE INSTR - COC
Care Documentation and Therapy:  Wound 08/19/24 Thigh Left;Anterior (Active)   Wound Image   03/18/25 0815   Wound Etiology Surgical 03/18/25 0815   Dressing Status Other (Comment) 03/18/25 0815   Wound Cleansed Not Cleansed 03/18/25 0815   Dressing/Treatment Open to air 03/18/25 0815   Number of days: 210       Wound 08/19/24 Hip Left (Active)   Number of days: 210       Wound 02/15/25 Abdomen Lower;Mid (Active)   Wound Image   03/14/25 1222   Wound Etiology Other 03/14/25 1222   Dressing Status New dressing applied 03/16/25 0800   Wound Cleansed Soap and water 03/16/25 0800   Dressing/Treatment Pharmaceutical agent (see MAR);Gauze dressing/dressing sponge 03/16/25 0800   Dressing Change Due 03/15/25 03/14/25 1222   Wound Length (cm) 3.5 cm 03/14/25 1222   Wound Width (cm) 4.8 cm 03/14/25 1222   Wound Depth (cm) 0.2 cm 03/14/25 1222   Wound Surface Area (cm^2) 16.8 cm^2 03/14/25 1222   Change in Wound Size % (l*w) -59.09 03/14/25 1222   Wound Volume (cm^3) 3.36 cm^3 03/14/25 1222   Wound Healing % -59 03/14/25 1222   Wound Assessment Other (Comment) 03/16/25 0800   Drainage Amount None (dry) 03/16/25 0800   Drainage Description Serosanguinous 03/14/25 1222   Odor None 03/16/25 0800   Valerie-wound Assessment Blanchable erythema 03/14/25 1222   Margins Attached edges;Defined edges 03/14/25 1222   Wound Thickness Description not for Pressure Injury Full thickness 03/14/25 1222   Number of days: 30       Wound 02/15/25 Thigh Right;Medial (Active)   Wound Image   03/14/25 1222   Wound Etiology Other 03/14/25 1222   Dressing Status Clean;Dry;Intact 03/16/25 0800   Wound Cleansed Not Cleansed 03/15/25 0400   Dressing/Treatment Pharmaceutical agent (see MAR) 03/16/25 0800   Dressing Change Due 03/15/25 03/14/25 1222   Wound Length (cm) 3.6 cm 03/14/25 1222   Wound Width (cm) 7.5 cm 03/14/25 1222   Wound Depth (cm) 0.1 cm 03/14/25 1222   Wound Surface Area (cm^2) 27 cm^2 03/14/25 1222   Change in Wound Size % (l*w) -3.45  {Esignature:[id number]}    CASE MANAGEMENT/SOCIAL WORK SECTION    Inpatient Status Date: ***    Readmission Risk Assessment Score:  [facility] RISK OF UNPLANNED READMISSION 2.0             35.4 Total Score        Discharging to Facility/ Agency   Name:   Address:  Phone:  Fax:    Dialysis Facility (if applicable)   Name:  Address:  Dialysis Schedule:  Phone:  Fax:    / signature: {Esignature:[id number]}    PHYSICIAN SECTION    Prognosis: {Prognosis:5718434981}    Condition at Discharge: { Patient Condition:498515037}    Rehab Potential (if transferring to Rehab): {Prognosis:6807161180}    Recommended Labs or Other Treatments After Discharge: ***    Abdominal and left thigh wound care: cleanse with saline, pat dry. Apply Clobetasol ointment to periwound (one inch around the wounds). Apply Santyl ointment, nickel thickness to cover wound bed. Cover with oil emulsion dressing or non-adherent gauze. Cover with island dressing. Change daily and as needed if loose or soiled.      Left hip wound care:  cleanse with saline, pat dry. Cover open area with collagen, cover with island dressing. Change daily and as needed if loose or soiled.        Physician Certification: I certify the above information and transfer of Krista Chiang  is necessary for the continuing treatment of the diagnosis listed and that she requires {Admit to Appropriate Level of Care:11680} for {GREATER/LESS:312790539} 30 days.     Update Admission H&P: No change in H&P    PHYSICIAN SIGNATURE:  Electronically signed by Crystal Estrada MD on 3/18/25 at 5:16 PM EDT

## 2025-03-18 NOTE — ED PROVIDER NOTES
[facility] EMERGENCY DEPARTMENT  Emergency Department Encounter  Emergency Medicine Resident     Pt Name:Krista Chiang  MRN: [id number]  Birthdate 1980  Date of evaluation: 3/17/25  PCP:  Arielle Melton APRN - NP  Note Started: 10:27 PM EDT      CHIEF COMPLAINT       Chief Complaint   Patient presents with    Fall    Hyperglycemia       HISTORY OF PRESENT ILLNESS  (Location/Symptom, Timing/Onset, Context/Setting, Quality, Duration, Modifying Factors, Severity.)      Krista Chiang is a 44 y.o. female presenting to ED for    CC's: Mechanical fall standing height, hyperglycemia    Patient endorses that she was recently discharged from the hospital after being admitted for hyperglycemia.  Patient was walking back to the bathroom when she had a mechanical fall from standing height without precipitating prodrome falling on her right hip and her right hand/arm.  Patient denies any numbness, tingling, weakness.  Patient denies any chest pain, shortness breath, fevers, chills, night sweats.  Patient Dors is right hip pain.  Patient has had unfortunate course of multiple surgical inventions to her hip endorsing that they have also taken her hip out because of a infection after hip fracture.  Patient is also chronically anticoagulated for antiphospholipid antibody syndrome.    In chart review patient was last admitted after fall with dizziness falling on left side.  Patient was initially admitted to ICU for DKA and uncontrolled diabetes with severely elevated A1c.  Patient developed SVT placed on amiodarone drip and weaned off to flecainide.  Patient ultimately required EP at outpatient.  Ortho recommended/requested outpatient follow-up.  EGD was reviewed with biopsies taken concerning for candidal esophagitis.  Patient had persistent diarrhea with GI panel negative as well as C. difficile negative.  Lactobacillus and cholestyramine added.    Notable PMHx/PSXH: History DVT, antiphospholipid midbody,  well as guided IV fluid rehydration and potassium repletion.  Patient given pain control, symptomatic treatment.  Discussed case with critical care agreed to follow patient while admitted.    *Additional specifics as per ED course when pertinent.    IMPRESSION: DKA    DISPO: Admission to ICU for DKA with critical care follow-up admitted.    Amount and/or Complexity of Data Reviewed  Labs: ordered. Decision-making details documented in ED Course.  Radiology: ordered.  ECG/medicine tests: ordered. Decision-making details documented in ED Course.    Risk  Prescription drug management.  Decision regarding hospitalization.        EKG    EKG normal sinus rhythm, normal NY, QRS, QTc interval, nonspecific T wave changes in anterior/precordial leads    All EKG's are interpreted by the Emergency Department Physician who either signs or Co-signs this chart in the absence of a cardiologist.    EMERGENCY DEPARTMENT COURSE:        ED Course as of 03/18/25 0229   Mon Mar 17, 2025   2149 POC Glucose(!!): 537 [CARYN]   2310 BLOOD GAS, VENOUS(!):    pH, Bryant 7.287(!)   pCO2, Bryant 32.4(!)   pO2, Bryant 36.6   Bicarbonate, Venous 15.1(!)   Negative Base Excess, Bryant 10.4(!)   O2 Saturation Venous 59.6(!)   Carboxyhemoglobin 2.9   Methemoglobin 0.3   Pt Temp 37.0   Pt. Position SEMI-FOWLERS  Metabolic acidosis with partial compensation with elevated anion gap. [CARYN]   2316 Given metabolic acidosis noted anion gap and glucose we will begin treatment with DKA protocol with insulin, IV fluids and serial evaluation [CARYN]   2324 EKG 12 Lead  EKG normal sinus rhythm, normal NY, QRS, QTc interval, nonspecific T wave changes in anterior/precordial leads [CARYN]   Tue Mar 18, 2025   0106 Glucose, Ur(!): LARGE [CARYN]   0107 Discussed case with critical care, DENNYS Dave  Agreeing with admission. [CARYN]   0111 Discussed case with admitting team, admitting team to place orders in, no new recommendations/orders at this time.  Will seek admission for DKA. [CARYN]

## 2025-03-18 NOTE — ED NOTES
Report given to PEBBLES Coyle from ICU.   Report method by phone   The following was reviewed with receiving RN:   Current vital signs:  BP (!) 167/73   Pulse 76   Temp 97.7 °F (36.5 °C) (Oral)   Resp 16   Ht 1.727 m (5' 8\")   Wt 93 kg (205 lb)   SpO2 100%   BMI 31.17 kg/m²                MEWS Score: 1     Any medication or safety alerts were reviewed. Any pending diagnostics and notifications were also reviewed, as well as any safety concerns or issues, abnormal labs, abnormal imaging, and abnormal assessment findings. Questions were answered.

## 2025-03-18 NOTE — PROGRESS NOTES
Wound Ostomy Continence Nursing  Follow Up Visit      NAME:  Krista Chiang  MEDICAL RECORD NUMBER:  [id number]  AGE: 44 y.o.   GENDER: female  : 1980  TODAY'S DATE:  3/18/2025    Subjective        [facility] nursing consult for management of multiple wounds.  The patient is well-known to our service. Was readmitted for a fall at home.    Review of Systems:  Constitutional: negative for chills and fevers  Respiratory: negative for cough and shortness of breath  Cardiovascular: negative for chest pain and palpitations  Gastrointestinal: negative for nausea, negative for abdominal pain and jaundice  Genitourinary:negative  Integument: Multiple wounds  Endocrine:  Recent A1c >10  Musculoskeletal:negative  Behavioral/Psych: negative  Pain: exquisite wound pain abdominal and right thigh      Objective     Vitals:  BP (!) 148/82   Pulse 59   Temp 98.2 °F (36.8 °C) (Oral)   Resp 18   Ht 1.727 m (5' 8\")   Wt 93 kg (205 lb)   SpO2 100%   BMI 31.17 kg/m²    TEMPERATURE:  Current - Temp: 98.2 °F (36.8 °C); Max - Temp  Av °F (36.7 °C)  Min: 97.7 °F (36.5 °C)  Max: 98.2 °F (36.8 °C)    Calixto Risk Score Calixto Scale Score: 19    INTAKE/OUTPUT:      Intake/Output Summary (Last 24 hours) at 3/18/2025 1141  Last data filed at 3/18/2025 0547  Gross per 24 hour   Intake 22.95 ml   Output 1700 ml   Net -1677.05 ml                 Physical Exam:    General Appearance: alert and oriented to person, place and time, well-developed and well-nourished, in no acute distress  Head: normocephalic and atraumatic  Pulmonary/Chest: normal air movement, no respiratory distress  Skin: Abdominal and right medial thigh wounds with some periwound erythema.  Wound beds covered primarily with yellow slough. Wounds exquisitely tender. Left hip wound reopened slightly.  Cardiovascular/Circulation: Minimal edema, no cyanosis, palpable peripheral pulses  Extremities: no cyanosis and no clubbing  Musculoskeletal: no joint deformity or tenderness,

## 2025-03-19 ENCOUNTER — CARE COORDINATION (OUTPATIENT)
Dept: CARE COORDINATION | Age: 45
End: 2025-03-19

## 2025-03-19 LAB
EKG ATRIAL RATE: 60 BPM
EKG ATRIAL RATE: 68 BPM
EKG P AXIS: 58 DEGREES
EKG P AXIS: 64 DEGREES
EKG P-R INTERVAL: 150 MS
EKG P-R INTERVAL: 158 MS
EKG Q-T INTERVAL: 454 MS
EKG Q-T INTERVAL: 456 MS
EKG QRS DURATION: 88 MS
EKG QRS DURATION: 90 MS
EKG QTC CALCULATION (BAZETT): 454 MS
EKG QTC CALCULATION (BAZETT): 484 MS
EKG R AXIS: 58 DEGREES
EKG R AXIS: 69 DEGREES
EKG T AXIS: 50 DEGREES
EKG T AXIS: 57 DEGREES
EKG VENTRICULAR RATE: 60 BPM
EKG VENTRICULAR RATE: 68 BPM

## 2025-03-19 PROCEDURE — 93010 ELECTROCARDIOGRAM REPORT: CPT | Performed by: INTERNAL MEDICINE

## 2025-03-19 NOTE — CARE COORDINATION
Care Transitions Note    Initial Call - Call within 2 business days of discharge: Yes    Attempted to reach patient for transitions of care follow up. Unable to reach patient.    Outreach Attempts:   HIPAA compliant voicemail left for patient.     Patient: Krista Chiang    Patient : 1980   MRN: [id number]    Reason for Admission: Fall,  Discharge Date: 3/18/25  RURS: Readmission Risk Score: 35.3    Last Discharge Facility       Date Complaint Diagnosis Description Type Department Provider    3/17/25 Fall; Hyperglycemia Fall, initial encounter ... ED to Hosp-Admission (Discharged) (ADMITTED) [facility] ICU Crystal Estrada MD; Rj Haines..            Was this an external facility discharge? No    Follow Up Appointment:   Patient does not have a follow up appointment scheduled at time of call.         Plan for follow-up on next business day. 2nd attempt 24 hr HFU call     Kinga Gardiner RN

## 2025-03-20 ENCOUNTER — CARE COORDINATION (OUTPATIENT)
Dept: CARE COORDINATION | Age: 45
End: 2025-03-20

## 2025-03-20 NOTE — CARE COORDINATION
Care Transitions Note    Initial Call - Call within 2 business days of discharge: Yes    Attempted to reach patient for transitions of care follow up. Unable to reach patient.    Outreach Attempts:   Multiple attempts to contact patient at phone numbers on file.     Patient: Krista Chiang    Patient : 1980   MRN: [id number]    Reason for Admission: Fall, initial encounter  Discharge Date: 3/18/25  RURS: Readmission Risk Score: 35.3    Last Discharge Facility       Date Complaint Diagnosis Description Type Department Provider    3/17/25 Fall; Hyperglycemia Fall, initial encounter ... ED to Hosp-Admission (Discharged) (ADMITTED) [facility] ICU Crystal Estrada MD; Flo Haines...            Was this an external facility discharge? No    Follow Up Appointment:   Patient does not have a follow up appointment scheduled at time of call.         No further follow-up call indicated     Kinga Gardiner RN

## 2025-03-21 NOTE — DISCHARGE SUMMARY
INSULIN LISPRO IJ     Insulin Pen Needle 31G X 5 MM Misc  1 each by Does not apply route daily     lactobacillus capsule  Take 1 capsule by mouth 2 times daily (with meals) for 15 days     Lancets 30G Misc  Testing twice a day.  Please dispense according to patients insurance.     LORazepam 1 MG tablet  Commonly known as: ATIVAN     metoprolol tartrate 50 MG tablet  Commonly known as: LOPRESSOR  Take 1 tablet by mouth 2 times daily     miconazole 2 % powder  Commonly known as: MICOTIN  Apply topically 2 times daily.     ondansetron 8 MG Tbdp disintegrating tablet  Commonly known as: ZOFRAN-ODT     pantoprazole 40 MG tablet  Commonly known as: PROTONIX  Take 1 tablet by mouth 2 times daily (before meals)     sucralfate 1 GM/10ML suspension  Commonly known as: Carafate  Take 10 mLs by mouth 4 times daily     SYRINGE 3CC/25GX1\" 25G X 1\" 3 ML Misc  To be used with B12 injections monthly     therapeutic multivitamin-minerals tablet  Take 1 tablet by mouth daily     zinc sulfate 220 (50 Zn)  mg capsule - elemental zinc  Commonly known as: ZINCATE  Take 1 capsule by mouth daily            ASK your doctor about these medications      melatonin 3 MG Tabs tablet     midodrine 2.5 MG tablet  Commonly known as: PROAMATINE     oxyCODONE-acetaminophen 5-325 MG per tablet  Commonly known as: Percocet  Take 1 tablet by mouth every 8 hours as needed for Pain for up to 2 days. Intended supply: 5 days. Take lowest dose possible to manage pain Max Daily Amount: 3 tablets  Ask about: Should I take this medication?               Where to Get Your Medications        These medications were sent to [id number] - [place], [place] - [address] - P [phone] - F [phone]  [address] [place] [address] [place] [address]      Phone: [phone]   insulin glargine 100 UNIT/ML injection vial  scopolamine transdermal patch         Time Spent on discharge is  35 mins in patient examination, evaluation, counseling as well as  medication reconciliation, prescriptions for required medications, discharge plan and follow up.    Electronically signed by   Crystal Estrada MD  3/20/2025  9:18 PM      Thank you Arielle Menjivar, APRN - NP for the opportunity to be involved in this patient's care.

## 2025-03-22 ENCOUNTER — APPOINTMENT (OUTPATIENT)
Dept: GENERAL RADIOLOGY | Age: 45
DRG: 312 | End: 2025-03-22
Payer: COMMERCIAL

## 2025-03-22 ENCOUNTER — HOSPITAL ENCOUNTER (EMERGENCY)
Age: 45
Discharge: HOME | DRG: 312 | End: 2025-03-23
Attending: STUDENT IN AN ORGANIZED HEALTH CARE EDUCATION/TRAINING PROGRAM
Payer: COMMERCIAL

## 2025-03-22 ENCOUNTER — APPOINTMENT (OUTPATIENT)
Dept: CT IMAGING | Age: 45
DRG: 312 | End: 2025-03-22
Payer: COMMERCIAL

## 2025-03-22 DIAGNOSIS — R73.9 HYPERGLYCEMIA: ICD-10-CM

## 2025-03-22 DIAGNOSIS — R11.2 NAUSEA AND VOMITING, UNSPECIFIED VOMITING TYPE: Primary | ICD-10-CM

## 2025-03-22 LAB
ALBUMIN SERPL-MCNC: 2.8 G/DL (ref 3.5–5.2)
ALP SERPL-CCNC: 207 U/L (ref 35–104)
ALT SERPL-CCNC: 44 U/L (ref 10–35)
ANION GAP SERPL CALCULATED.3IONS-SCNC: 17 MMOL/L (ref 9–16)
AST SERPL-CCNC: 29 U/L (ref 10–35)
B-OH-BUTYR SERPL-MCNC: 0.13 MMOL/L (ref 0.02–0.27)
BACTERIA URNS QL MICRO: ABNORMAL
BASOPHILS # BLD: 0.1 K/UL (ref 0–0.2)
BASOPHILS NFR BLD: 1 % (ref 0–2)
BILIRUB SERPL-MCNC: <0.2 MG/DL (ref 0–1.2)
BILIRUB UR QL STRIP: NEGATIVE
BUN SERPL-MCNC: 6 MG/DL (ref 6–20)
CALCIUM SERPL-MCNC: 8.3 MG/DL (ref 8.6–10.4)
CHLORIDE SERPL-SCNC: 109 MMOL/L (ref 98–107)
CLARITY UR: ABNORMAL
CO2 SERPL-SCNC: 15 MMOL/L (ref 20–31)
COLOR UR: YELLOW
CREAT SERPL-MCNC: 0.6 MG/DL (ref 0.7–1.2)
EOSINOPHIL # BLD: 0.1 K/UL (ref 0–0.4)
EOSINOPHILS RELATIVE PERCENT: 1 % (ref 0–4)
EPI CELLS #/AREA URNS HPF: ABNORMAL /HPF
ERYTHROCYTE [DISTWIDTH] IN BLOOD BY AUTOMATED COUNT: 18.4 % (ref 11.5–14.9)
ETHANOL PERCENT: 0.08 %
ETHANOLAMINE SERPL-MCNC: 84 MG/DL (ref 0–0.08)
GFR, ESTIMATED: >90 ML/MIN/1.73M2
GLUCOSE SERPL-MCNC: 192 MG/DL (ref 74–99)
GLUCOSE UR STRIP-MCNC: ABNORMAL MG/DL
HCT VFR BLD AUTO: 36.2 % (ref 36–46)
HGB BLD-MCNC: 12 G/DL (ref 12–16)
HGB UR QL STRIP.AUTO: NEGATIVE
INR PPP: 0.9
KETONES UR STRIP-MCNC: NEGATIVE MG/DL
LEUKOCYTE ESTERASE UR QL STRIP: ABNORMAL
LYMPHOCYTES NFR BLD: 1.6 K/UL (ref 1–4.8)
LYMPHOCYTES RELATIVE PERCENT: 27 % (ref 24–44)
MAGNESIUM SERPL-MCNC: 1.8 MG/DL (ref 1.6–2.6)
MCH RBC QN AUTO: 29.5 PG (ref 26–34)
MCHC RBC AUTO-ENTMCNC: 33 G/DL (ref 31–37)
MCV RBC AUTO: 89.4 FL (ref 80–100)
MONOCYTES NFR BLD: 0.5 K/UL (ref 0.1–1.3)
MONOCYTES NFR BLD: 9 % (ref 1–7)
NEUTROPHILS NFR BLD: 62 % (ref 36–66)
NEUTS SEG NFR BLD: 3.6 K/UL (ref 1.3–9.1)
NITRITE UR QL STRIP: NEGATIVE
PH UR STRIP: 6 [PH] (ref 5–8)
PLATELET # BLD AUTO: 242 K/UL (ref 150–450)
PMV BLD AUTO: 10 FL (ref 6–12)
POTASSIUM SERPL-SCNC: 3.3 MMOL/L (ref 3.7–5.3)
PROT SERPL-MCNC: 5.7 G/DL (ref 6.6–8.7)
PROT UR STRIP-MCNC: NEGATIVE MG/DL
PROTHROMBIN TIME: 13.4 SEC (ref 11.8–14.6)
RBC # BLD AUTO: 4.05 M/UL (ref 4–5.2)
RBC #/AREA URNS HPF: ABNORMAL /HPF
SODIUM SERPL-SCNC: 141 MMOL/L (ref 136–145)
SP GR UR STRIP: 1 (ref 1–1.03)
TROPONIN I SERPL HS-MCNC: 14 NG/L (ref 0–14)
UROBILINOGEN UR STRIP-ACNC: NORMAL EU/DL (ref 0–1)
WBC #/AREA URNS HPF: ABNORMAL /HPF
WBC OTHER # BLD: 5.8 K/UL (ref 3.5–11)

## 2025-03-22 PROCEDURE — 83735 ASSAY OF MAGNESIUM: CPT

## 2025-03-22 PROCEDURE — 6360000002 HC RX W HCPCS: Performed by: STUDENT IN AN ORGANIZED HEALTH CARE EDUCATION/TRAINING PROGRAM

## 2025-03-22 PROCEDURE — 96374 THER/PROPH/DIAG INJ IV PUSH: CPT

## 2025-03-22 PROCEDURE — 85610 PROTHROMBIN TIME: CPT

## 2025-03-22 PROCEDURE — 82805 BLOOD GASES W/O2 SATURATION: CPT

## 2025-03-22 PROCEDURE — 70450 CT HEAD/BRAIN W/O DYE: CPT

## 2025-03-22 PROCEDURE — 96375 TX/PRO/DX INJ NEW DRUG ADDON: CPT

## 2025-03-22 PROCEDURE — 71045 X-RAY EXAM CHEST 1 VIEW: CPT

## 2025-03-22 PROCEDURE — 84484 ASSAY OF TROPONIN QUANT: CPT

## 2025-03-22 PROCEDURE — 93005 ELECTROCARDIOGRAM TRACING: CPT | Performed by: STUDENT IN AN ORGANIZED HEALTH CARE EDUCATION/TRAINING PROGRAM

## 2025-03-22 PROCEDURE — 82010 KETONE BODYS QUAN: CPT

## 2025-03-22 PROCEDURE — 81001 URINALYSIS AUTO W/SCOPE: CPT

## 2025-03-22 PROCEDURE — 96376 TX/PRO/DX INJ SAME DRUG ADON: CPT

## 2025-03-22 PROCEDURE — 74176 CT ABD & PELVIS W/O CONTRAST: CPT

## 2025-03-22 PROCEDURE — 80053 COMPREHEN METABOLIC PANEL: CPT

## 2025-03-22 PROCEDURE — G0480 DRUG TEST DEF 1-7 CLASSES: HCPCS

## 2025-03-22 PROCEDURE — 85025 COMPLETE CBC W/AUTO DIFF WBC: CPT

## 2025-03-22 PROCEDURE — 99285 EMERGENCY DEPT VISIT HI MDM: CPT

## 2025-03-22 PROCEDURE — 36415 COLL VENOUS BLD VENIPUNCTURE: CPT

## 2025-03-22 PROCEDURE — 72125 CT NECK SPINE W/O DYE: CPT

## 2025-03-22 RX ORDER — ONDANSETRON 2 MG/ML
4 INJECTION INTRAMUSCULAR; INTRAVENOUS ONCE
Status: COMPLETED | OUTPATIENT
Start: 2025-03-22 | End: 2025-03-22

## 2025-03-22 RX ORDER — MORPHINE SULFATE 4 MG/ML
4 INJECTION, SOLUTION INTRAMUSCULAR; INTRAVENOUS
Refills: 0 | Status: COMPLETED | OUTPATIENT
Start: 2025-03-22 | End: 2025-03-22

## 2025-03-22 RX ORDER — MORPHINE SULFATE 4 MG/ML
4 INJECTION, SOLUTION INTRAMUSCULAR; INTRAVENOUS ONCE
Refills: 0 | Status: COMPLETED | OUTPATIENT
Start: 2025-03-22 | End: 2025-03-22

## 2025-03-22 RX ADMIN — MORPHINE SULFATE 4 MG: 4 INJECTION, SOLUTION INTRAMUSCULAR; INTRAVENOUS at 23:12

## 2025-03-22 RX ADMIN — ONDANSETRON 4 MG: 2 INJECTION, SOLUTION INTRAMUSCULAR; INTRAVENOUS at 21:55

## 2025-03-22 RX ADMIN — ONDANSETRON 4 MG: 2 INJECTION, SOLUTION INTRAMUSCULAR; INTRAVENOUS at 22:56

## 2025-03-22 RX ADMIN — MORPHINE SULFATE 4 MG: 4 INJECTION, SOLUTION INTRAMUSCULAR; INTRAVENOUS at 21:56

## 2025-03-22 ASSESSMENT — PAIN DESCRIPTION - ORIENTATION
ORIENTATION: LEFT
ORIENTATION: LEFT

## 2025-03-22 ASSESSMENT — PAIN DESCRIPTION - LOCATION
LOCATION: HIP
LOCATION: HIP

## 2025-03-22 ASSESSMENT — PAIN DESCRIPTION - DESCRIPTORS
DESCRIPTORS: PENETRATING
DESCRIPTORS: PENETRATING

## 2025-03-22 ASSESSMENT — PAIN SCALES - GENERAL
PAINLEVEL_OUTOF10: 7
PAINLEVEL_OUTOF10: 7

## 2025-03-23 VITALS
HEART RATE: 118 BPM | TEMPERATURE: 98.1 F | WEIGHT: 205 LBS | RESPIRATION RATE: 16 BRPM | BODY MASS INDEX: 31.17 KG/M2 | OXYGEN SATURATION: 100 % | DIASTOLIC BLOOD PRESSURE: 76 MMHG | SYSTOLIC BLOOD PRESSURE: 132 MMHG

## 2025-03-23 LAB
BODY TEMPERATURE: 37
COHGB MFR BLD: 2.3 % (ref 0–5)
GAS FLOW.O2 O2 DELIVERY SYS: ABNORMAL L/MIN
HCO3 VENOUS: 14.5 MMOL/L (ref 24–30)
METHEMOGLOBIN: 0.3 % (ref 0–1.9)
NEGATIVE BASE EXCESS, VEN: 9.3 MMOL/L (ref 0–2)
O2 SAT, VEN: 74.1 % (ref 60–85)
PCO2 VENOUS: 26 MM HG (ref 39–55)
PH VENOUS: 7.36 (ref 7.32–7.42)
PO2 VENOUS: 43 MM HG (ref 30–50)
TEXT FOR RESPIRATORY: ABNORMAL

## 2025-03-23 PROCEDURE — 6360000002 HC RX W HCPCS: Performed by: STUDENT IN AN ORGANIZED HEALTH CARE EDUCATION/TRAINING PROGRAM

## 2025-03-23 PROCEDURE — 96376 TX/PRO/DX INJ SAME DRUG ADON: CPT

## 2025-03-23 RX ORDER — 0.9 % SODIUM CHLORIDE 0.9 %
1000 INTRAVENOUS SOLUTION INTRAVENOUS ONCE
Status: DISCONTINUED | OUTPATIENT
Start: 2025-03-23 | End: 2025-03-23 | Stop reason: HOSPADM

## 2025-03-23 RX ORDER — MORPHINE SULFATE 4 MG/ML
4 INJECTION, SOLUTION INTRAMUSCULAR; INTRAVENOUS ONCE
Refills: 0 | Status: COMPLETED | OUTPATIENT
Start: 2025-03-23 | End: 2025-03-23

## 2025-03-23 RX ORDER — ONDANSETRON 2 MG/ML
4 INJECTION INTRAMUSCULAR; INTRAVENOUS ONCE
Status: COMPLETED | OUTPATIENT
Start: 2025-03-23 | End: 2025-03-23

## 2025-03-23 RX ADMIN — MORPHINE SULFATE 4 MG: 4 INJECTION, SOLUTION INTRAMUSCULAR; INTRAVENOUS at 01:27

## 2025-03-23 RX ADMIN — ONDANSETRON 4 MG: 2 INJECTION, SOLUTION INTRAMUSCULAR; INTRAVENOUS at 01:28

## 2025-03-23 ASSESSMENT — PAIN DESCRIPTION - DESCRIPTORS: DESCRIPTORS: CRUSHING

## 2025-03-23 ASSESSMENT — PAIN - FUNCTIONAL ASSESSMENT: PAIN_FUNCTIONAL_ASSESSMENT: 0-10

## 2025-03-23 ASSESSMENT — PAIN SCALES - GENERAL
PAINLEVEL_OUTOF10: 7
PAINLEVEL_OUTOF10: 0

## 2025-03-23 ASSESSMENT — PAIN DESCRIPTION - ORIENTATION: ORIENTATION: LEFT

## 2025-03-23 ASSESSMENT — PAIN DESCRIPTION - LOCATION: LOCATION: HIP

## 2025-03-23 NOTE — ED PROVIDER NOTES
EMERGENCY DEPARTMENT ENCOUNTER    Pt Name: Krista Chiang  MRN: [id number]  Birthdate 1980  Date of evaluation: 3/22/25  CHIEF COMPLAINT       Chief Complaint   Patient presents with    Hyperglycemia    Nausea     HISTORY OF PRESENT ILLNESS   44-year-old history of multiple comorbidities and several admissions for DKA and falls presenting with fall and elevated blood sugar    States her sugars are in the 400s today    Got up to go to the bathroom and fell and hurt her left hip    No head injury no loss of consciousness    Having some nausea and abdominal cramping no chest pain or pressure no shortness of breath              REVIEW OF SYSTEMS     Review of Systems   Constitutional:  Negative for chills and fever.   HENT:  Negative for rhinorrhea and sore throat.    Eyes:  Negative for discharge and redness.   Respiratory:  Negative for shortness of breath.    Cardiovascular:  Negative for chest pain.   Gastrointestinal:  Positive for abdominal pain and nausea. Negative for diarrhea and vomiting.   Genitourinary:  Negative for dysuria, frequency and urgency.   Musculoskeletal:  Negative for arthralgias and myalgias.        Hip pain   Skin:  Negative for rash.   Neurological:  Negative for weakness and numbness.   Psychiatric/Behavioral:  Negative for suicidal ideas.    All other systems reviewed and are negative.    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 encounter     left    Constipation 09/03/2019    Depression 07/12/2015    Fibromyalgia     Genital herpes, unspecified     GERD (gastroesophageal reflux disease) 2021    H/O bariatric surgery 2013     All other components within normal limits   MAGNESIUM   PROTIME-INR   TROPONIN   BETA-HYDROXYBUTYRATE   BLOOD GAS, VENOUS       Vitals Reviewed:    Vitals:    03/22/25 2156 03/22/25 2312 03/22/25 2317 03/23/25 0127   BP:   132/76    Pulse:       Resp: 16 16  16   Temp:       TempSrc:       SpO2:   100%    Weight:         MEDICATIONS GIVEN TO PATIENT THIS ENCOUNTER:  Orders Placed This Encounter   Medications    morphine injection 4 mg     Refill:  0    ondansetron (ZOFRAN) injection 4 mg    ondansetron (ZOFRAN) injection 4 mg    morphine injection 4 mg     Refill:  0    sodium chloride 0.9 % bolus 1,000 mL    morphine injection 4 mg     Refill:  0    ondansetron (ZOFRAN) injection 4 mg     DISCHARGE PRESCRIPTIONS:  New Prescriptions    No medications on file     PHYSICIAN CONSULTS ORDERED THIS ENCOUNTER:  None  FINAL IMPRESSION      1. Nausea and vomiting, unspecified vomiting type    2. Hyperglycemia          DISPOSITION/PLAN   DISPOSITION Decision To Discharge 03/23/2025 01:02:55 AM   DISPOSITION CONDITION Stable           OUTPATIENT FOLLOW UP THE PATIENT:  Arielle Melton, APRN - NP  [address] [facility] [address] [place], [place] [id number] [phone]    Schedule an appointment as soon as possible for a visit       [facility] Emergency Department  [address] [address]  [phone]    If symptoms worsen      MD Zaki Garcia Jeffrey, MD  03/23/25 0120       Skinny Haines MD  03/23/25 0136

## 2025-03-24 LAB
EKG ATRIAL RATE: 72 BPM
EKG P AXIS: 63 DEGREES
EKG P-R INTERVAL: 124 MS
EKG Q-T INTERVAL: 426 MS
EKG QRS DURATION: 84 MS
EKG QTC CALCULATION (BAZETT): 466 MS
EKG R AXIS: 71 DEGREES
EKG T AXIS: 69 DEGREES
EKG VENTRICULAR RATE: 72 BPM

## 2025-03-24 PROCEDURE — 93010 ELECTROCARDIOGRAM REPORT: CPT | Performed by: INTERNAL MEDICINE

## 2025-03-26 ENCOUNTER — APPOINTMENT (OUTPATIENT)
Dept: GENERAL RADIOLOGY | Age: 45
DRG: 312 | End: 2025-03-26
Attending: EMERGENCY MEDICINE
Payer: COMMERCIAL

## 2025-03-26 ENCOUNTER — APPOINTMENT (OUTPATIENT)
Dept: GENERAL RADIOLOGY | Age: 45
DRG: 312 | End: 2025-03-26
Payer: COMMERCIAL

## 2025-03-26 ENCOUNTER — HOSPITAL ENCOUNTER (INPATIENT)
Age: 45
LOS: 5 days | Discharge: HOME | DRG: 312 | End: 2025-03-31
Attending: EMERGENCY MEDICINE | Admitting: INTERNAL MEDICINE
Payer: COMMERCIAL

## 2025-03-26 ENCOUNTER — APPOINTMENT (OUTPATIENT)
Dept: CT IMAGING | Age: 45
DRG: 312 | End: 2025-03-26
Payer: COMMERCIAL

## 2025-03-26 DIAGNOSIS — M25.552 LEFT HIP PAIN: ICD-10-CM

## 2025-03-26 DIAGNOSIS — M25.552 PAIN OF LEFT HIP: ICD-10-CM

## 2025-03-26 DIAGNOSIS — R55 SYNCOPE AND COLLAPSE: Primary | ICD-10-CM

## 2025-03-26 DIAGNOSIS — E86.0 DEHYDRATION: ICD-10-CM

## 2025-03-26 DIAGNOSIS — R11.0 NAUSEA: ICD-10-CM

## 2025-03-26 DIAGNOSIS — M79.632 PAIN OF LEFT FOREARM: ICD-10-CM

## 2025-03-26 PROBLEM — R53.83 OTHER FATIGUE: Status: ACTIVE | Noted: 2025-03-26

## 2025-03-26 LAB
ANION GAP SERPL CALCULATED.3IONS-SCNC: 12 MMOL/L (ref 9–16)
BASOPHILS # BLD: 0.1 K/UL (ref 0–0.2)
BASOPHILS NFR BLD: 1 % (ref 0–2)
BILIRUB UR QL STRIP: NEGATIVE
BUN SERPL-MCNC: 4 MG/DL (ref 6–20)
CALCIUM SERPL-MCNC: 8.3 MG/DL (ref 8.6–10.4)
CHLORIDE SERPL-SCNC: 110 MMOL/L (ref 98–107)
CLARITY UR: CLEAR
CO2 SERPL-SCNC: 22 MMOL/L (ref 20–31)
COLOR UR: YELLOW
COMMENT: ABNORMAL
CREAT SERPL-MCNC: 0.5 MG/DL (ref 0.7–1.2)
EOSINOPHIL # BLD: 0 K/UL (ref 0–0.4)
EOSINOPHILS RELATIVE PERCENT: 0 % (ref 0–4)
ERYTHROCYTE [DISTWIDTH] IN BLOOD BY AUTOMATED COUNT: 18.1 % (ref 11.5–14.9)
GFR, ESTIMATED: >90 ML/MIN/1.73M2
GLUCOSE BLD-MCNC: 196 MG/DL (ref 65–105)
GLUCOSE SERPL-MCNC: 126 MG/DL (ref 74–99)
GLUCOSE UR STRIP-MCNC: NEGATIVE MG/DL
HCG UR QL: NEGATIVE
HCT VFR BLD AUTO: 35.5 % (ref 36–46)
HGB BLD-MCNC: 11.7 G/DL (ref 12–16)
HGB UR QL STRIP.AUTO: NEGATIVE
INR PPP: 0.9
KETONES UR STRIP-MCNC: ABNORMAL MG/DL
LEUKOCYTE ESTERASE UR QL STRIP: NEGATIVE
LYMPHOCYTES NFR BLD: 1.4 K/UL (ref 1–4.8)
LYMPHOCYTES RELATIVE PERCENT: 22 % (ref 24–44)
MAGNESIUM SERPL-MCNC: 1.8 MG/DL (ref 1.6–2.6)
MCH RBC QN AUTO: 29.4 PG (ref 26–34)
MCHC RBC AUTO-ENTMCNC: 33 G/DL (ref 31–37)
MCV RBC AUTO: 89.2 FL (ref 80–100)
MONOCYTES NFR BLD: 0.3 K/UL (ref 0.1–1.3)
MONOCYTES NFR BLD: 5 % (ref 1–7)
MYOGLOBIN SERPL-MCNC: <21 NG/ML (ref 25–58)
NEUTROPHILS NFR BLD: 72 % (ref 36–66)
NEUTS SEG NFR BLD: 4.5 K/UL (ref 1.3–9.1)
NITRITE UR QL STRIP: NEGATIVE
PARTIAL THROMBOPLASTIN TIME: 30.8 SEC (ref 24–36)
PH UR STRIP: 7 [PH] (ref 5–8)
PLATELET # BLD AUTO: 297 K/UL (ref 150–450)
PMV BLD AUTO: 9.6 FL (ref 6–12)
POTASSIUM SERPL-SCNC: 3.7 MMOL/L (ref 3.7–5.3)
PREALB SERPL-MCNC: 13.6 MG/DL (ref 20–40)
PROT UR STRIP-MCNC: NEGATIVE MG/DL
PROTHROMBIN TIME: 12.9 SEC (ref 11.8–14.6)
RBC # BLD AUTO: 3.99 M/UL (ref 4–5.2)
SODIUM SERPL-SCNC: 144 MMOL/L (ref 136–145)
SP GR UR STRIP: 1 (ref 1–1.03)
T4 FREE SERPL-MCNC: 1.3 NG/DL (ref 0.9–1.7)
TROPONIN I SERPL HS-MCNC: 15 NG/L (ref 0–14)
TSH SERPL DL<=0.05 MIU/L-ACNC: 1.99 UIU/ML (ref 0.27–4.2)
UROBILINOGEN UR STRIP-ACNC: NORMAL EU/DL (ref 0–1)
WBC OTHER # BLD: 6.3 K/UL (ref 3.5–11)

## 2025-03-26 PROCEDURE — 84484 ASSAY OF TROPONIN QUANT: CPT

## 2025-03-26 PROCEDURE — 84439 ASSAY OF FREE THYROXINE: CPT

## 2025-03-26 PROCEDURE — 93005 ELECTROCARDIOGRAM TRACING: CPT | Performed by: EMERGENCY MEDICINE

## 2025-03-26 PROCEDURE — 81003 URINALYSIS AUTO W/O SCOPE: CPT

## 2025-03-26 PROCEDURE — 85610 PROTHROMBIN TIME: CPT

## 2025-03-26 PROCEDURE — 6360000002 HC RX W HCPCS

## 2025-03-26 PROCEDURE — 82947 ASSAY GLUCOSE BLOOD QUANT: CPT

## 2025-03-26 PROCEDURE — 85730 THROMBOPLASTIN TIME PARTIAL: CPT

## 2025-03-26 PROCEDURE — 84134 ASSAY OF PREALBUMIN: CPT

## 2025-03-26 PROCEDURE — 6360000002 HC RX W HCPCS: Performed by: EMERGENCY MEDICINE

## 2025-03-26 PROCEDURE — 6360000002 HC RX W HCPCS: Performed by: NURSE PRACTITIONER

## 2025-03-26 PROCEDURE — 71045 X-RAY EXAM CHEST 1 VIEW: CPT

## 2025-03-26 PROCEDURE — 96374 THER/PROPH/DIAG INJ IV PUSH: CPT

## 2025-03-26 PROCEDURE — 1200000000 HC SEMI PRIVATE

## 2025-03-26 PROCEDURE — 36415 COLL VENOUS BLD VENIPUNCTURE: CPT

## 2025-03-26 PROCEDURE — 84443 ASSAY THYROID STIM HORMONE: CPT

## 2025-03-26 PROCEDURE — 80048 BASIC METABOLIC PNL TOTAL CA: CPT

## 2025-03-26 PROCEDURE — 73552 X-RAY EXAM OF FEMUR 2/>: CPT

## 2025-03-26 PROCEDURE — 81025 URINE PREGNANCY TEST: CPT

## 2025-03-26 PROCEDURE — 70450 CT HEAD/BRAIN W/O DYE: CPT

## 2025-03-26 PROCEDURE — 2580000003 HC RX 258: Performed by: EMERGENCY MEDICINE

## 2025-03-26 PROCEDURE — 83874 ASSAY OF MYOGLOBIN: CPT

## 2025-03-26 PROCEDURE — 2580000003 HC RX 258

## 2025-03-26 PROCEDURE — 6370000000 HC RX 637 (ALT 250 FOR IP)

## 2025-03-26 PROCEDURE — 96376 TX/PRO/DX INJ SAME DRUG ADON: CPT

## 2025-03-26 PROCEDURE — 72125 CT NECK SPINE W/O DYE: CPT

## 2025-03-26 PROCEDURE — 85025 COMPLETE CBC W/AUTO DIFF WBC: CPT

## 2025-03-26 PROCEDURE — 96375 TX/PRO/DX INJ NEW DRUG ADDON: CPT

## 2025-03-26 PROCEDURE — 83735 ASSAY OF MAGNESIUM: CPT

## 2025-03-26 PROCEDURE — 99223 1ST HOSP IP/OBS HIGH 75: CPT | Performed by: INTERNAL MEDICINE

## 2025-03-26 PROCEDURE — 99285 EMERGENCY DEPT VISIT HI MDM: CPT

## 2025-03-26 RX ORDER — OXYCODONE AND ACETAMINOPHEN 5; 325 MG/1; MG/1
1 TABLET ORAL EVERY 8 HOURS PRN
Status: ON HOLD | COMMUNITY
Start: 2025-03-19 | End: 2025-03-31 | Stop reason: HOSPADM

## 2025-03-26 RX ORDER — SUCRALFATE 1 G/1
1 TABLET ORAL EVERY 8 HOURS SCHEDULED
Status: DISCONTINUED | OUTPATIENT
Start: 2025-03-26 | End: 2025-03-31 | Stop reason: HOSPADM

## 2025-03-26 RX ORDER — VALSARTAN 80 MG/1
80 TABLET ORAL DAILY
Status: DISCONTINUED | OUTPATIENT
Start: 2025-03-26 | End: 2025-03-31 | Stop reason: HOSPADM

## 2025-03-26 RX ORDER — LACTOBACILLUS RHAMNOSUS GG 10B CELL
1 CAPSULE ORAL 2 TIMES DAILY WITH MEALS
Status: DISCONTINUED | OUTPATIENT
Start: 2025-03-26 | End: 2025-03-31 | Stop reason: HOSPADM

## 2025-03-26 RX ORDER — M-VIT,TX,IRON,MINS/CALC/FOLIC 27MG-0.4MG
1 TABLET ORAL DAILY
Status: DISCONTINUED | OUTPATIENT
Start: 2025-03-26 | End: 2025-03-31 | Stop reason: HOSPADM

## 2025-03-26 RX ORDER — ONDANSETRON 2 MG/ML
4 INJECTION INTRAMUSCULAR; INTRAVENOUS EVERY 6 HOURS PRN
Status: DISCONTINUED | OUTPATIENT
Start: 2025-03-26 | End: 2025-03-31 | Stop reason: HOSPADM

## 2025-03-26 RX ORDER — SODIUM CHLORIDE 9 MG/ML
INJECTION, SOLUTION INTRAVENOUS PRN
Status: DISCONTINUED | OUTPATIENT
Start: 2025-03-26 | End: 2025-03-31 | Stop reason: HOSPADM

## 2025-03-26 RX ORDER — METOPROLOL TARTRATE 50 MG
50 TABLET ORAL 2 TIMES DAILY
Status: DISCONTINUED | OUTPATIENT
Start: 2025-03-26 | End: 2025-03-31 | Stop reason: HOSPADM

## 2025-03-26 RX ORDER — BUMETANIDE 1 MG/1
2 TABLET ORAL DAILY
Status: DISCONTINUED | OUTPATIENT
Start: 2025-03-26 | End: 2025-03-27

## 2025-03-26 RX ORDER — POTASSIUM CHLORIDE 7.45 MG/ML
10 INJECTION INTRAVENOUS PRN
Status: DISCONTINUED | OUTPATIENT
Start: 2025-03-26 | End: 2025-03-31 | Stop reason: HOSPADM

## 2025-03-26 RX ORDER — FLECAINIDE ACETATE 100 MG/1
100 TABLET ORAL 2 TIMES DAILY
Status: DISCONTINUED | OUTPATIENT
Start: 2025-03-26 | End: 2025-03-31 | Stop reason: HOSPADM

## 2025-03-26 RX ORDER — FONDAPARINUX SODIUM 10 MG/.8ML
10 INJECTION SUBCUTANEOUS DAILY
COMMUNITY

## 2025-03-26 RX ORDER — MIDODRINE HYDROCHLORIDE 2.5 MG/1
2.5 TABLET ORAL 2 TIMES DAILY PRN
Status: DISCONTINUED | OUTPATIENT
Start: 2025-03-26 | End: 2025-03-31 | Stop reason: HOSPADM

## 2025-03-26 RX ORDER — VALSARTAN 80 MG/1
80 TABLET ORAL DAILY
COMMUNITY

## 2025-03-26 RX ORDER — ACETAMINOPHEN 650 MG/1
650 SUPPOSITORY RECTAL EVERY 6 HOURS PRN
Status: DISCONTINUED | OUTPATIENT
Start: 2025-03-26 | End: 2025-03-31 | Stop reason: HOSPADM

## 2025-03-26 RX ORDER — POLYETHYLENE GLYCOL 3350 17 G/17G
17 POWDER, FOR SOLUTION ORAL DAILY PRN
Status: DISCONTINUED | OUTPATIENT
Start: 2025-03-26 | End: 2025-03-31 | Stop reason: HOSPADM

## 2025-03-26 RX ORDER — SCOPOLAMINE 1 MG/3D
1 PATCH, EXTENDED RELEASE TRANSDERMAL
Status: DISCONTINUED | OUTPATIENT
Start: 2025-03-26 | End: 2025-03-31 | Stop reason: HOSPADM

## 2025-03-26 RX ORDER — FONDAPARINUX SODIUM 7.5 MG/.6ML
7.5 INJECTION SUBCUTANEOUS DAILY
Status: DISCONTINUED | OUTPATIENT
Start: 2025-03-26 | End: 2025-03-26

## 2025-03-26 RX ORDER — ONDANSETRON 2 MG/ML
4 INJECTION INTRAMUSCULAR; INTRAVENOUS ONCE
Status: COMPLETED | OUTPATIENT
Start: 2025-03-26 | End: 2025-03-26

## 2025-03-26 RX ORDER — INSULIN GLARGINE 100 [IU]/ML
10 INJECTION, SOLUTION SUBCUTANEOUS DAILY
Status: CANCELLED | OUTPATIENT
Start: 2025-03-26

## 2025-03-26 RX ORDER — SILVER SULFADIAZINE 10 MG/G
1 CREAM TOPICAL 2 TIMES DAILY
COMMUNITY
Start: 2025-03-19 | End: 2025-04-02

## 2025-03-26 RX ORDER — PANTOPRAZOLE SODIUM 40 MG/1
40 TABLET, DELAYED RELEASE ORAL
Status: DISCONTINUED | OUTPATIENT
Start: 2025-03-26 | End: 2025-03-31 | Stop reason: HOSPADM

## 2025-03-26 RX ORDER — MORPHINE SULFATE 4 MG/ML
4 INJECTION, SOLUTION INTRAMUSCULAR; INTRAVENOUS ONCE
Refills: 0 | Status: COMPLETED | OUTPATIENT
Start: 2025-03-26 | End: 2025-03-26

## 2025-03-26 RX ORDER — DEXTROSE MONOHYDRATE 100 MG/ML
INJECTION, SOLUTION INTRAVENOUS CONTINUOUS PRN
Status: DISCONTINUED | OUTPATIENT
Start: 2025-03-26 | End: 2025-03-31 | Stop reason: HOSPADM

## 2025-03-26 RX ORDER — INSULIN GLARGINE 300 U/ML
15 INJECTION, SOLUTION SUBCUTANEOUS NIGHTLY
COMMUNITY
End: 2025-03-26

## 2025-03-26 RX ORDER — LORAZEPAM 0.5 MG/1
0.5 TABLET ORAL EVERY 8 HOURS PRN
Status: DISCONTINUED | OUTPATIENT
Start: 2025-03-26 | End: 2025-03-31 | Stop reason: HOSPADM

## 2025-03-26 RX ORDER — FONDAPARINUX SODIUM 10 MG/.8ML
10 INJECTION SUBCUTANEOUS DAILY
Status: DISCONTINUED | OUTPATIENT
Start: 2025-03-26 | End: 2025-03-31 | Stop reason: HOSPADM

## 2025-03-26 RX ORDER — PREGABALIN 50 MG/1
50 CAPSULE ORAL 3 TIMES DAILY
COMMUNITY

## 2025-03-26 RX ORDER — PRAVASTATIN SODIUM 40 MG
40 TABLET ORAL NIGHTLY
Status: DISCONTINUED | OUTPATIENT
Start: 2025-03-26 | End: 2025-03-31 | Stop reason: HOSPADM

## 2025-03-26 RX ORDER — MORPHINE SULFATE 2 MG/ML
1 INJECTION, SOLUTION INTRAMUSCULAR; INTRAVENOUS EVERY 6 HOURS PRN
Status: DISCONTINUED | OUTPATIENT
Start: 2025-03-26 | End: 2025-03-30

## 2025-03-26 RX ORDER — POTASSIUM CHLORIDE 1500 MG/1
40 TABLET, EXTENDED RELEASE ORAL PRN
Status: DISCONTINUED | OUTPATIENT
Start: 2025-03-26 | End: 2025-03-31 | Stop reason: HOSPADM

## 2025-03-26 RX ORDER — DOXYCYCLINE HYCLATE 100 MG
100 TABLET ORAL 2 TIMES DAILY
COMMUNITY

## 2025-03-26 RX ORDER — BUPROPION HYDROCHLORIDE 300 MG/1
300 TABLET ORAL EVERY MORNING
COMMUNITY

## 2025-03-26 RX ORDER — INSULIN LISPRO 100 [IU]/ML
0-8 INJECTION, SOLUTION INTRAVENOUS; SUBCUTANEOUS
Status: DISCONTINUED | OUTPATIENT
Start: 2025-03-26 | End: 2025-03-31 | Stop reason: HOSPADM

## 2025-03-26 RX ORDER — SODIUM CHLORIDE 0.9 % (FLUSH) 0.9 %
5-40 SYRINGE (ML) INJECTION PRN
Status: DISCONTINUED | OUTPATIENT
Start: 2025-03-26 | End: 2025-03-31 | Stop reason: HOSPADM

## 2025-03-26 RX ORDER — MORPHINE SULFATE 2 MG/ML
1 INJECTION, SOLUTION INTRAMUSCULAR; INTRAVENOUS ONCE
Status: COMPLETED | OUTPATIENT
Start: 2025-03-26 | End: 2025-03-26

## 2025-03-26 RX ORDER — PREGABALIN 25 MG/1
50 CAPSULE ORAL 3 TIMES DAILY
Status: DISCONTINUED | OUTPATIENT
Start: 2025-03-26 | End: 2025-03-31 | Stop reason: HOSPADM

## 2025-03-26 RX ORDER — MAGNESIUM SULFATE HEPTAHYDRATE 40 MG/ML
2000 INJECTION, SOLUTION INTRAVENOUS PRN
Status: DISCONTINUED | OUTPATIENT
Start: 2025-03-26 | End: 2025-03-31 | Stop reason: HOSPADM

## 2025-03-26 RX ORDER — SODIUM CHLORIDE 9 MG/ML
INJECTION, SOLUTION INTRAVENOUS CONTINUOUS
Status: DISCONTINUED | OUTPATIENT
Start: 2025-03-26 | End: 2025-03-31 | Stop reason: HOSPADM

## 2025-03-26 RX ORDER — 0.9 % SODIUM CHLORIDE 0.9 %
1000 INTRAVENOUS SOLUTION INTRAVENOUS ONCE
Status: COMPLETED | OUTPATIENT
Start: 2025-03-26 | End: 2025-03-26

## 2025-03-26 RX ORDER — SODIUM CHLORIDE 0.9 % (FLUSH) 0.9 %
5-40 SYRINGE (ML) INJECTION EVERY 12 HOURS SCHEDULED
Status: DISCONTINUED | OUTPATIENT
Start: 2025-03-26 | End: 2025-03-31 | Stop reason: HOSPADM

## 2025-03-26 RX ORDER — ACETAMINOPHEN 325 MG/1
650 TABLET ORAL EVERY 6 HOURS PRN
Status: DISCONTINUED | OUTPATIENT
Start: 2025-03-26 | End: 2025-03-31 | Stop reason: HOSPADM

## 2025-03-26 RX ORDER — ONDANSETRON 4 MG/1
4 TABLET, ORALLY DISINTEGRATING ORAL EVERY 8 HOURS PRN
Status: DISCONTINUED | OUTPATIENT
Start: 2025-03-26 | End: 2025-03-31 | Stop reason: HOSPADM

## 2025-03-26 RX ORDER — INSULIN GLARGINE 100 [IU]/ML
12 INJECTION, SOLUTION SUBCUTANEOUS NIGHTLY
Status: DISCONTINUED | OUTPATIENT
Start: 2025-03-26 | End: 2025-03-30

## 2025-03-26 RX ADMIN — ONDANSETRON 4 MG: 2 INJECTION, SOLUTION INTRAMUSCULAR; INTRAVENOUS at 13:11

## 2025-03-26 RX ADMIN — METOPROLOL TARTRATE 50 MG: 50 TABLET, FILM COATED ORAL at 19:52

## 2025-03-26 RX ADMIN — LORAZEPAM 0.5 MG: 0.5 TABLET ORAL at 22:12

## 2025-03-26 RX ADMIN — PANTOPRAZOLE SODIUM 40 MG: 40 TABLET, DELAYED RELEASE ORAL at 20:12

## 2025-03-26 RX ADMIN — PREGABALIN 50 MG: 25 CAPSULE ORAL at 20:12

## 2025-03-26 RX ADMIN — MORPHINE SULFATE 1 MG: 2 INJECTION, SOLUTION INTRAMUSCULAR; INTRAVENOUS at 23:15

## 2025-03-26 RX ADMIN — ONDANSETRON 4 MG: 2 INJECTION, SOLUTION INTRAMUSCULAR; INTRAVENOUS at 18:33

## 2025-03-26 RX ADMIN — ONDANSETRON 4 MG: 2 INJECTION, SOLUTION INTRAMUSCULAR; INTRAVENOUS at 15:01

## 2025-03-26 RX ADMIN — VALSARTAN 80 MG: 80 TABLET, FILM COATED ORAL at 19:52

## 2025-03-26 RX ADMIN — Medication 1 TABLET: at 20:12

## 2025-03-26 RX ADMIN — PRAVASTATIN SODIUM 40 MG: 40 TABLET ORAL at 20:13

## 2025-03-26 RX ADMIN — MORPHINE SULFATE 4 MG: 4 INJECTION, SOLUTION INTRAMUSCULAR; INTRAVENOUS at 13:11

## 2025-03-26 RX ADMIN — MORPHINE SULFATE 4 MG: 4 INJECTION, SOLUTION INTRAMUSCULAR; INTRAVENOUS at 15:01

## 2025-03-26 RX ADMIN — SODIUM CHLORIDE 1000 ML: 0.9 INJECTION, SOLUTION INTRAVENOUS at 13:15

## 2025-03-26 RX ADMIN — Medication 1 CAPSULE: at 20:12

## 2025-03-26 RX ADMIN — SODIUM CHLORIDE: 9 INJECTION, SOLUTION INTRAVENOUS at 19:49

## 2025-03-26 RX ADMIN — INSULIN LISPRO 2 UNITS: 100 INJECTION, SOLUTION INTRAVENOUS; SUBCUTANEOUS at 22:12

## 2025-03-26 RX ADMIN — FLECAINIDE ACETATE 100 MG: 100 TABLET ORAL at 20:11

## 2025-03-26 RX ADMIN — MORPHINE SULFATE 1 MG: 2 INJECTION, SOLUTION INTRAMUSCULAR; INTRAVENOUS at 18:33

## 2025-03-26 RX ADMIN — INSULIN GLARGINE 12 UNITS: 100 INJECTION, SOLUTION SUBCUTANEOUS at 22:12

## 2025-03-26 RX ADMIN — SUCRALFATE 1 G: 1 TABLET ORAL at 22:12

## 2025-03-26 ASSESSMENT — PAIN DESCRIPTION - ORIENTATION
ORIENTATION: LEFT

## 2025-03-26 ASSESSMENT — PAIN - FUNCTIONAL ASSESSMENT: PAIN_FUNCTIONAL_ASSESSMENT: 0-10

## 2025-03-26 ASSESSMENT — PAIN DESCRIPTION - LOCATION
LOCATION: HIP
LOCATION: STERNUM
LOCATION: HIP
LOCATION: HIP

## 2025-03-26 ASSESSMENT — PAIN SCALES - GENERAL
PAINLEVEL_OUTOF10: 8
PAINLEVEL_OUTOF10: 8
PAINLEVEL_OUTOF10: 7
PAINLEVEL_OUTOF10: 7
PAINLEVEL_OUTOF10: 8
PAINLEVEL_OUTOF10: 9

## 2025-03-26 ASSESSMENT — PAIN DESCRIPTION - DESCRIPTORS
DESCRIPTORS: ACHING
DESCRIPTORS: BURNING;SHOOTING
DESCRIPTORS: ACHING

## 2025-03-26 NOTE — PLAN OF CARE
Problem: Pain  Goal: Verbalizes/displays adequate comfort level or baseline comfort level  Outcome: Progressing  Note: Assess the location, characteristics, onset, duration, frequency, quality, and severity of pain. Encourage immediate report of pain. Use appropriate pain scale to rate pain. Manage pain using nonpharmacologic/pharmacologic interventions.

## 2025-03-26 NOTE — ED NOTES
Vascular access team consulted via perfect serve to insert a new IV. Previous IV infiltrated while infusing. IV removed. [employer] notified.

## 2025-03-26 NOTE — ED NOTES
Report given to PEBBLES Busby from [employer].   Report method by phone   The following was reviewed with receiving RN:   Current vital signs:  BP (!) 156/88   Pulse 76   Temp 98.4 °F (36.9 °C) (Oral)   Resp 15   Ht 1.727 m (5' 8\")   Wt 93 kg (205 lb)   SpO2 100%   BMI 31.17 kg/m²                MEWS Score: 1     Any medication or safety alerts were reviewed. Any pending diagnostics and notifications were also reviewed, as well as any safety concerns or issues, abnormal labs, abnormal imaging, and abnormal assessment findings. Questions were answered.

## 2025-03-26 NOTE — ED PROVIDER NOTES
well-developed. She is not diaphoretic.   HENT:      Head: Normocephalic and atraumatic.   Eyes:      General: No scleral icterus.        Right eye: No discharge.         Left eye: No discharge.      Conjunctiva/sclera: Conjunctivae normal.      Pupils: Pupils are equal, round, and reactive to light.   Cardiovascular:      Rate and Rhythm: Normal rate and regular rhythm.      Heart sounds: Normal heart sounds. No murmur heard.     No friction rub. No gallop.   Pulmonary:      Effort: Pulmonary effort is normal. No respiratory distress.      Breath sounds: Normal breath sounds. No wheezing or rales.   Chest:      Chest wall: No tenderness.   Abdominal:      General: Bowel sounds are normal. There is no distension.      Palpations: Abdomen is soft. There is no mass.      Tenderness: There is no abdominal tenderness. There is no guarding or rebound.   Musculoskeletal:      Cervical back: Normal.      Thoracic back: Normal.      Lumbar back: Normal.      Left hip: Deformity, tenderness and bony tenderness present. No lacerations or crepitus. Decreased range of motion. Normal strength.   Skin:     General: Skin is warm and dry.      Coloration: Skin is not pale.      Findings: No erythema or rash.   Neurological:      Mental Status: She is alert and oriented to person, place, and time.      Cranial Nerves: No cranial nerve deficit.      Sensory: No sensory deficit.      Motor: No weakness or abnormal muscle tone.   Psychiatric:         Behavior: Behavior normal.         Thought Content: Thought content normal.         Judgment: Judgment normal.           MEDICAL DECISION MAKIN)  Number and Complexity of Problems  Problem List This Visit:  Syncope, dizziness, hip pain    Differential Diagnosis:  Hip contusion, chronic pain, dehydration, vertigo    Diagnoses Considered but Do Not Suspect:  Cardiogenic syncope    Pertinent Comorbid Conditions:  Osteoarthritis, osteomyelitis, diabetes    2)  Data Reviewed  Decision

## 2025-03-26 NOTE — H&P
confirmed emergency medical condition->Emergency Medical Condition (MA) Is the patient pregnant?->No Reason for Exam: FFSH on blood thinners; ORDERING SYSTEM PROVIDED HISTORY: FFSH on blood thinners TECHNOLOGIST PROVIDED HISTORY: FFSH on blood thinners Decision Support Exception - unselect if not a suspected or confirmed emergency medical condition->Emergency Medical Condition (MA) Is the patient pregnant?->No Reason for Exam: FFSH on blood thinners FINDINGS: HEAD: BRAIN/VENTRICLES: There is no acute intracranial hemorrhage, mass effect or midline shift. No abnormal extra-axial fluid collection.  The gray-white differentiation is maintained.  There is no evidence of hydrocephalus. ORBITS: The visualized portion of the orbits demonstrate no acute abnormality. SINUSES: The visualized paranasal sinuses and mastoid air cells demonstrate no acute abnormality. SOFT TISSUES/SKULL:  No acute abnormality of the visualized skull or soft tissues. CERVICAL SPINE: BONES/ALIGNMENT: Congenital incomplete ossification of the posterior ring of C1.  There is no evidence of an acute cervical spine fracture. No traumatic malalignment. DEGENERATIVE CHANGES: No significant degenerative changes. SOFT TISSUES: There is no prevertebral soft tissue swelling.  Carotid calcified atheromatous plaque.     No acute CT abnormality identified in the brain. No acute osseous abnormality identified in the cervical spine.     XR HIP LEFT (2-3 VIEWS)  Result Date: 3/18/2025  EXAMINATION: TWO XRAY VIEWS OF THE LEFT HIP 3/18/2025 2:12 pm COMPARISON: Left hip radiograph 03/09/2025 HISTORY: Left hip area pain after falling yesterday FINDINGS: *3 images are presented. *Unchanged positioning of the proximal left femur with absent femoral head. *Stable proximal femoral remnant articulation at the superior left acetabular rim. *No acute fracture evident. *Stable exam from prior study 9 days ago.     1. Unchanged positioning of the proximal left femur with

## 2025-03-27 LAB
EKG ATRIAL RATE: 75 BPM
EKG ATRIAL RATE: 94 BPM
EKG P AXIS: 48 DEGREES
EKG P AXIS: 72 DEGREES
EKG P-R INTERVAL: 120 MS
EKG P-R INTERVAL: 126 MS
EKG Q-T INTERVAL: 388 MS
EKG Q-T INTERVAL: 424 MS
EKG QRS DURATION: 84 MS
EKG QRS DURATION: 84 MS
EKG QTC CALCULATION (BAZETT): 473 MS
EKG QTC CALCULATION (BAZETT): 485 MS
EKG R AXIS: 66 DEGREES
EKG R AXIS: 73 DEGREES
EKG T AXIS: 46 DEGREES
EKG T AXIS: 48 DEGREES
EKG VENTRICULAR RATE: 75 BPM
EKG VENTRICULAR RATE: 94 BPM
GLUCOSE BLD-MCNC: 113 MG/DL (ref 65–105)
GLUCOSE BLD-MCNC: 168 MG/DL (ref 65–105)
GLUCOSE BLD-MCNC: 184 MG/DL (ref 65–105)
GLUCOSE BLD-MCNC: 189 MG/DL (ref 65–105)

## 2025-03-27 PROCEDURE — 97530 THERAPEUTIC ACTIVITIES: CPT

## 2025-03-27 PROCEDURE — 82947 ASSAY GLUCOSE BLOOD QUANT: CPT

## 2025-03-27 PROCEDURE — 97162 PT EVAL MOD COMPLEX 30 MIN: CPT

## 2025-03-27 PROCEDURE — 99213 OFFICE O/P EST LOW 20 MIN: CPT

## 2025-03-27 PROCEDURE — 6370000000 HC RX 637 (ALT 250 FOR IP)

## 2025-03-27 PROCEDURE — 99232 SBSQ HOSP IP/OBS MODERATE 35: CPT | Performed by: INTERNAL MEDICINE

## 2025-03-27 PROCEDURE — 93010 ELECTROCARDIOGRAM REPORT: CPT | Performed by: INTERNAL MEDICINE

## 2025-03-27 PROCEDURE — 6360000002 HC RX W HCPCS

## 2025-03-27 PROCEDURE — 1200000000 HC SEMI PRIVATE

## 2025-03-27 PROCEDURE — 97165 OT EVAL LOW COMPLEX 30 MIN: CPT

## 2025-03-27 PROCEDURE — 2580000003 HC RX 258

## 2025-03-27 RX ORDER — DOXYCYCLINE 100 MG/1
100 CAPSULE ORAL 2 TIMES DAILY
Status: DISCONTINUED | OUTPATIENT
Start: 2025-03-27 | End: 2025-03-31 | Stop reason: HOSPADM

## 2025-03-27 RX ORDER — BUMETANIDE 1 MG/1
2 TABLET ORAL DAILY PRN
Status: DISCONTINUED | OUTPATIENT
Start: 2025-03-27 | End: 2025-03-31 | Stop reason: HOSPADM

## 2025-03-27 RX ADMIN — MORPHINE SULFATE 1 MG: 2 INJECTION, SOLUTION INTRAMUSCULAR; INTRAVENOUS at 23:16

## 2025-03-27 RX ADMIN — FLECAINIDE ACETATE 100 MG: 100 TABLET ORAL at 08:30

## 2025-03-27 RX ADMIN — SUCRALFATE 1 G: 1 TABLET ORAL at 13:22

## 2025-03-27 RX ADMIN — INSULIN LISPRO 2 UNITS: 100 INJECTION, SOLUTION INTRAVENOUS; SUBCUTANEOUS at 17:18

## 2025-03-27 RX ADMIN — SUCRALFATE 1 G: 1 TABLET ORAL at 05:01

## 2025-03-27 RX ADMIN — PREGABALIN 50 MG: 25 CAPSULE ORAL at 21:37

## 2025-03-27 RX ADMIN — ONDANSETRON 4 MG: 2 INJECTION, SOLUTION INTRAMUSCULAR; INTRAVENOUS at 08:23

## 2025-03-27 RX ADMIN — MORPHINE SULFATE 1 MG: 2 INJECTION, SOLUTION INTRAMUSCULAR; INTRAVENOUS at 04:58

## 2025-03-27 RX ADMIN — PANTOPRAZOLE SODIUM 40 MG: 40 TABLET, DELAYED RELEASE ORAL at 05:01

## 2025-03-27 RX ADMIN — LORAZEPAM 0.5 MG: 0.5 TABLET ORAL at 21:45

## 2025-03-27 RX ADMIN — LORAZEPAM 0.5 MG: 0.5 TABLET ORAL at 08:26

## 2025-03-27 RX ADMIN — PREGABALIN 50 MG: 25 CAPSULE ORAL at 08:26

## 2025-03-27 RX ADMIN — SODIUM CHLORIDE: 9 INJECTION, SOLUTION INTRAVENOUS at 08:34

## 2025-03-27 RX ADMIN — SUCRALFATE 1 G: 1 TABLET ORAL at 21:37

## 2025-03-27 RX ADMIN — VALSARTAN 80 MG: 80 TABLET, FILM COATED ORAL at 08:29

## 2025-03-27 RX ADMIN — METOPROLOL TARTRATE 50 MG: 50 TABLET, FILM COATED ORAL at 08:26

## 2025-03-27 RX ADMIN — BUMETANIDE 2 MG: 1 TABLET ORAL at 08:25

## 2025-03-27 RX ADMIN — MORPHINE SULFATE 1 MG: 2 INJECTION, SOLUTION INTRAMUSCULAR; INTRAVENOUS at 17:19

## 2025-03-27 RX ADMIN — FLECAINIDE ACETATE 100 MG: 100 TABLET ORAL at 22:05

## 2025-03-27 RX ADMIN — INSULIN LISPRO 2 UNITS: 100 INJECTION, SOLUTION INTRAVENOUS; SUBCUTANEOUS at 11:26

## 2025-03-27 RX ADMIN — FONDAPARINUX SODIUM 10 MG: 10 INJECTION, SOLUTION SUBCUTANEOUS at 08:36

## 2025-03-27 RX ADMIN — PREGABALIN 50 MG: 25 CAPSULE ORAL at 13:22

## 2025-03-27 RX ADMIN — METOPROLOL TARTRATE 50 MG: 50 TABLET, FILM COATED ORAL at 21:38

## 2025-03-27 RX ADMIN — PRAVASTATIN SODIUM 40 MG: 40 TABLET ORAL at 21:37

## 2025-03-27 RX ADMIN — MORPHINE SULFATE 1 MG: 2 INJECTION, SOLUTION INTRAMUSCULAR; INTRAVENOUS at 11:26

## 2025-03-27 RX ADMIN — Medication 1 TABLET: at 08:26

## 2025-03-27 RX ADMIN — DOXYCYCLINE 100 MG: 100 CAPSULE ORAL at 21:36

## 2025-03-27 RX ADMIN — INSULIN GLARGINE 12 UNITS: 100 INJECTION, SOLUTION SUBCUTANEOUS at 21:39

## 2025-03-27 RX ADMIN — Medication 1 CAPSULE: at 17:09

## 2025-03-27 RX ADMIN — Medication 1 CAPSULE: at 08:25

## 2025-03-27 RX ADMIN — DOXYCYCLINE 100 MG: 100 CAPSULE ORAL at 13:25

## 2025-03-27 RX ADMIN — PANTOPRAZOLE SODIUM 40 MG: 40 TABLET, DELAYED RELEASE ORAL at 15:34

## 2025-03-27 ASSESSMENT — PAIN DESCRIPTION - ORIENTATION
ORIENTATION: LEFT
ORIENTATION: RIGHT

## 2025-03-27 ASSESSMENT — PAIN SCALES - GENERAL
PAINLEVEL_OUTOF10: 8
PAINLEVEL_OUTOF10: 6
PAINLEVEL_OUTOF10: 6
PAINLEVEL_OUTOF10: 7
PAINLEVEL_OUTOF10: 7
PAINLEVEL_OUTOF10: 5

## 2025-03-27 ASSESSMENT — PAIN DESCRIPTION - LOCATION
LOCATION: HIP
LOCATION: HIP;OTHER (COMMENT)
LOCATION: HIP;ABDOMEN;LEG
LOCATION: HIP

## 2025-03-27 ASSESSMENT — PAIN DESCRIPTION - DESCRIPTORS
DESCRIPTORS: ACHING
DESCRIPTORS: ACHING
DESCRIPTORS: BURNING;SHARP
DESCRIPTORS: BURNING

## 2025-03-27 NOTE — CARE COORDINATION
Case Management Assessment  Initial Evaluation    Date/Time of Evaluation: 3/27/2025 10:18 AM  Assessment Completed by: Geri Pringle RN    If patient is discharged prior to next notation, then this note serves as note for discharge by case management.    Patient Name: Krista Chiang                   YOB: 1980  Diagnosis: Syncope and collapse [R55]  Dehydration [E86.0]  Nausea [R11.0]  Other fatigue [R53.83]  Pain of left hip [M25.552]                   Date / Time: 3/26/2025 10:55 AM    Patient Admission Status: Inpatient   Readmission Risk (Low < 19, Mod (19-27), High > 27): Readmission Risk Score: 37.3    Current PCP: Arielle Melton APRN - NP  PCP verified by CM? Yes    Chart Reviewed: Yes      History Provided by: Patient  Patient Orientation: Alert and Oriented    Patient Cognition: Alert    Hospitalization in the last 30 days (Readmission):  Yes    If yes, Readmission Assessment in  Navigator will be completed.    Advance Directives:      Code Status: Full Code   Patient's Primary Decision Maker is: Legal Next of Kin    Primary Decision Maker: Naima Grant - Parent - [phone]    Secondary Decision Maker: JuanitaZachery - Brother/Sister - [phone]    Discharge Planning:    Patient lives with: Alone Type of Home: Trailer/Mobile Home  Primary Care Giver: Self  Patient Support Systems include: Parent, Family Members   Current Financial resources: Medicaid, Medicare  Current community resources: ECF/Home Care (Current w/ [facility])  Current services prior to admission: Durable Medical Equipment, Home Care            Current DME: Bedside Commode, Glucometer, Shower Chair, Walker, Wheelchair (Rt Shoe Lift)            Type of Home Care services:  OT, PT, Skilled Therapy    ADLS  Prior functional level: Assistance with the following:, Bathing, Dressing, Cooking, Housework, Shopping, Mobility, Toileting  Current functional level: Assistance with the following:, Bathing, Dressing,

## 2025-03-27 NOTE — DISCHARGE INSTR - COC
cover with nonstick Telfa pad and bordered dressing.  Change daily and as needed if loose or soiled.      Elimination:  Continence:   Bowel: Yes  Bladder: Yes  Urinary Catheter: None   Colostomy/Ileostomy/Ileal Conduit: No       Date of Last BM: 3/30    Intake/Output Summary (Last 24 hours) at 3/27/2025 1023  Last data filed at 3/26/2025 1548  Gross per 24 hour   Intake 700 ml   Output --   Net 700 ml     I/O last 3 completed shifts:  In: 700 [IV Piggyback:700]  Out: -     Safety Concerns:     History of Falls (last 30 days) and At Risk for Falls    Impairments/Disabilities:      None    Nutrition Therapy:  Current Nutrition Therapy:   - Oral Diet:  General    Routes of Feeding: Oral  Liquids: No Restrictions  Daily Fluid Restriction: no  Last Modified Barium Swallow with Video (Video Swallowing Test): not done    Treatments at the Time of Hospital Discharge:   Respiratory Treatments: n/a  Oxygen Therapy:  is not on home oxygen therapy.  Ventilator:    - No ventilator support    Rehab Therapies: Physical Therapy and Occupational Therapy  Weight Bearing Status/Restrictions: No weight bearing restrictions  Other Medical Equipment (for information only, NOT a DME order):  Bilateral Shoe Lifts  Other Treatments: Skilled Nursing assessment and monitoring. Medication education and monitoring per protocol.  LSW CONSULT- HELP W/ HHA & POSSIBLE TRANSPORTATION ISSUES, AREA OFFICE OF AGING ASSISTANCE.    Patient's personal belongings (please select all that are sent with patient):  None    RN SIGNATURE:  Electronically signed by Kehinde Swift RN on 3/31/25 at 3:32 PM EDT    CASE MANAGEMENT/SOCIAL WORK SECTION    Inpatient Status Date: 3/26/2025    Readmission Risk Assessment Score:  [facility] RISK OF UNPLANNED READMISSION 2.0             37.3 Total Score        Discharging to Facility/ Agency   [facility]  [address]  [facility] [address]  Phone [phone]  Fax  [phone]     Dialysis Facility (if

## 2025-03-27 NOTE — CARE COORDINATION
Post Acute Facility/Agency List     Provided patient with the following list, the list includes the overall star ratings obtained from CMS per the Medicare Web site (www.Medicare.gov):     [] Long Term Acute Care Facilities  [] Acute Inpatient Rehabilitation Facilities  [x] Skilled Nursing Facilities  [] Hospice Facilities  [] Home Care    Provided verbal instructions on how to utilize the QR Code to obtain additional detailed star ratings from www.Medicare.gov     offered to print and provide the detailed list:    [x]Accepted   []Declined    The following printed detailed lists were provided:     Encourage pt. To get 3 choices & writer will send referrals.

## 2025-03-28 PROBLEM — R11.0 NAUSEA: Status: ACTIVE | Noted: 2025-01-03

## 2025-03-28 LAB
BACTERIA URNS QL MICRO: ABNORMAL
BILIRUB UR QL STRIP: NEGATIVE
CASTS #/AREA URNS LPF: ABNORMAL /LPF
CLARITY UR: CLEAR
COLOR UR: YELLOW
EPI CELLS #/AREA URNS HPF: ABNORMAL /HPF
GLUCOSE BLD-MCNC: 111 MG/DL (ref 65–105)
GLUCOSE BLD-MCNC: 112 MG/DL (ref 65–105)
GLUCOSE BLD-MCNC: 150 MG/DL (ref 65–105)
GLUCOSE BLD-MCNC: 324 MG/DL (ref 65–105)
GLUCOSE BLD-MCNC: 96 MG/DL (ref 65–105)
GLUCOSE UR STRIP-MCNC: NEGATIVE MG/DL
HGB UR QL STRIP.AUTO: NEGATIVE
KETONES UR STRIP-MCNC: NEGATIVE MG/DL
LEUKOCYTE ESTERASE UR QL STRIP: ABNORMAL
LIPASE SERPL-CCNC: 7 U/L (ref 13–60)
NITRITE UR QL STRIP: NEGATIVE
PH UR STRIP: 6.5 [PH] (ref 5–8)
PROT UR STRIP-MCNC: NEGATIVE MG/DL
RBC #/AREA URNS HPF: ABNORMAL /HPF
SP GR UR STRIP: 1.01 (ref 1–1.03)
UROBILINOGEN UR STRIP-ACNC: NORMAL EU/DL (ref 0–1)
WBC #/AREA URNS HPF: ABNORMAL /HPF

## 2025-03-28 PROCEDURE — 99223 1ST HOSP IP/OBS HIGH 75: CPT | Performed by: INTERNAL MEDICINE

## 2025-03-28 PROCEDURE — 1200000000 HC SEMI PRIVATE

## 2025-03-28 PROCEDURE — 36415 COLL VENOUS BLD VENIPUNCTURE: CPT

## 2025-03-28 PROCEDURE — 6360000002 HC RX W HCPCS

## 2025-03-28 PROCEDURE — 6370000000 HC RX 637 (ALT 250 FOR IP)

## 2025-03-28 PROCEDURE — 6370000000 HC RX 637 (ALT 250 FOR IP): Performed by: NURSE PRACTITIONER

## 2025-03-28 PROCEDURE — 2580000003 HC RX 258

## 2025-03-28 PROCEDURE — 83690 ASSAY OF LIPASE: CPT

## 2025-03-28 PROCEDURE — 81001 URINALYSIS AUTO W/SCOPE: CPT

## 2025-03-28 PROCEDURE — APPNB30 APP NON BILLABLE TIME 0-30 MINS: Performed by: NURSE PRACTITIONER

## 2025-03-28 PROCEDURE — 82947 ASSAY GLUCOSE BLOOD QUANT: CPT

## 2025-03-28 RX ADMIN — LORAZEPAM 0.5 MG: 0.5 TABLET ORAL at 20:54

## 2025-03-28 RX ADMIN — INSULIN LISPRO 6 UNITS: 100 INJECTION, SOLUTION INTRAVENOUS; SUBCUTANEOUS at 20:57

## 2025-03-28 RX ADMIN — FLECAINIDE ACETATE 100 MG: 100 TABLET ORAL at 10:27

## 2025-03-28 RX ADMIN — LORAZEPAM 0.5 MG: 0.5 TABLET ORAL at 05:35

## 2025-03-28 RX ADMIN — PRAVASTATIN SODIUM 40 MG: 40 TABLET ORAL at 20:54

## 2025-03-28 RX ADMIN — METOPROLOL TARTRATE 50 MG: 50 TABLET, FILM COATED ORAL at 10:23

## 2025-03-28 RX ADMIN — Medication 1 CAPSULE: at 10:24

## 2025-03-28 RX ADMIN — ONDANSETRON 4 MG: 2 INJECTION, SOLUTION INTRAMUSCULAR; INTRAVENOUS at 11:46

## 2025-03-28 RX ADMIN — DOXYCYCLINE 100 MG: 100 CAPSULE ORAL at 10:24

## 2025-03-28 RX ADMIN — METOPROLOL TARTRATE 50 MG: 50 TABLET, FILM COATED ORAL at 20:57

## 2025-03-28 RX ADMIN — MORPHINE SULFATE 1 MG: 2 INJECTION, SOLUTION INTRAMUSCULAR; INTRAVENOUS at 11:46

## 2025-03-28 RX ADMIN — SUCRALFATE 1 G: 1 TABLET ORAL at 20:54

## 2025-03-28 RX ADMIN — PANTOPRAZOLE SODIUM 40 MG: 40 TABLET, DELAYED RELEASE ORAL at 05:29

## 2025-03-28 RX ADMIN — SUCRALFATE 1 G: 1 TABLET ORAL at 05:29

## 2025-03-28 RX ADMIN — MORPHINE SULFATE 1 MG: 2 INJECTION, SOLUTION INTRAMUSCULAR; INTRAVENOUS at 05:29

## 2025-03-28 RX ADMIN — PANTOPRAZOLE SODIUM 40 MG: 40 TABLET, DELAYED RELEASE ORAL at 14:35

## 2025-03-28 RX ADMIN — FONDAPARINUX SODIUM 10 MG: 10 INJECTION, SOLUTION SUBCUTANEOUS at 10:27

## 2025-03-28 RX ADMIN — PREGABALIN 50 MG: 25 CAPSULE ORAL at 10:24

## 2025-03-28 RX ADMIN — PREGABALIN 50 MG: 25 CAPSULE ORAL at 14:35

## 2025-03-28 RX ADMIN — DOXYCYCLINE 100 MG: 100 CAPSULE ORAL at 20:54

## 2025-03-28 RX ADMIN — SUCRALFATE 1 G: 1 TABLET ORAL at 14:35

## 2025-03-28 RX ADMIN — VALSARTAN 80 MG: 80 TABLET, FILM COATED ORAL at 10:28

## 2025-03-28 RX ADMIN — INSULIN GLARGINE 12 UNITS: 100 INJECTION, SOLUTION SUBCUTANEOUS at 20:57

## 2025-03-28 RX ADMIN — FLECAINIDE ACETATE 100 MG: 100 TABLET ORAL at 20:55

## 2025-03-28 RX ADMIN — Medication 1 TABLET: at 10:24

## 2025-03-28 RX ADMIN — Medication 1 CAPSULE: at 18:03

## 2025-03-28 RX ADMIN — PREGABALIN 50 MG: 25 CAPSULE ORAL at 20:54

## 2025-03-28 RX ADMIN — PANCRELIPASE LIPASE, PANCRELIPASE PROTEASE, PANCRELIPASE AMYLASE 20000 UNITS: 20000; 63000; 84000 CAPSULE, DELAYED RELEASE ORAL at 18:52

## 2025-03-28 RX ADMIN — SODIUM CHLORIDE: 9 INJECTION, SOLUTION INTRAVENOUS at 11:52

## 2025-03-28 RX ADMIN — MORPHINE SULFATE 1 MG: 2 INJECTION, SOLUTION INTRAMUSCULAR; INTRAVENOUS at 18:03

## 2025-03-28 ASSESSMENT — PAIN DESCRIPTION - ORIENTATION
ORIENTATION: LEFT
ORIENTATION: RIGHT
ORIENTATION: LEFT

## 2025-03-28 ASSESSMENT — PAIN DESCRIPTION - DESCRIPTORS
DESCRIPTORS: BURNING;SHARP
DESCRIPTORS: ACHING;THROBBING
DESCRIPTORS: BURNING;SHARP

## 2025-03-28 ASSESSMENT — PAIN SCALES - GENERAL
PAINLEVEL_OUTOF10: 4
PAINLEVEL_OUTOF10: 6
PAINLEVEL_OUTOF10: 7
PAINLEVEL_OUTOF10: 6
PAINLEVEL_OUTOF10: 7

## 2025-03-28 ASSESSMENT — PAIN DESCRIPTION - LOCATION
LOCATION: HIP;OTHER (COMMENT)
LOCATION: HIP;OTHER (COMMENT)
LOCATION: HIP

## 2025-03-28 NOTE — CARE COORDINATION
Writer attempted to schedule a Hospital F/U apt w/ PCP, Arielle Melton.     Per \"Rhyssa\", from the office, states, She already has an apt on 4/1/25 @ 1:30 PM, however it is for a med check. She will need to reach out to the Doctor to see if they can change visit to Hospital F/U apt. They will need to contact the pt. In regards to this, for the DrFarhad Is out of the office until Monday. Information placed on AVS.

## 2025-03-28 NOTE — PLAN OF CARE
Problem: Chronic Conditions and Co-morbidities  Goal: Patient's chronic conditions and co-morbidity symptoms are monitored and maintained or improved  3/28/2025 1529 by Fabienne Uribe RN  Outcome: Progressing  Flowsheets (Taken 3/28/2025 0414 by Krista Jolly, RN)  Care Plan - Patient's Chronic Conditions and Co-Morbidity Symptoms are Monitored and Maintained or Improved:   Monitor and assess patient's chronic conditions and comorbid symptoms for stability, deterioration, or improvement   Collaborate with multidisciplinary team to address chronic and comorbid conditions and prevent exacerbation or deterioration   Update acute care plan with appropriate goals if chronic or comorbid symptoms are exacerbated and prevent overall improvement and discharge  3/28/2025 0414 by Krista Jolly RN  Outcome: Progressing  Flowsheets (Taken 3/28/2025 0414)  Care Plan - Patient's Chronic Conditions and Co-Morbidity Symptoms are Monitored and Maintained or Improved:   Monitor and assess patient's chronic conditions and comorbid symptoms for stability, deterioration, or improvement   Collaborate with multidisciplinary team to address chronic and comorbid conditions and prevent exacerbation or deterioration   Update acute care plan with appropriate goals if chronic or comorbid symptoms are exacerbated and prevent overall improvement and discharge     Problem: Discharge Planning  Goal: Discharge to home or other facility with appropriate resources  3/28/2025 1529 by Fabienne Uribe RN  Outcome: Progressing  Flowsheets (Taken 3/28/2025 0414 by Krista Jloly RN)  Discharge to home or other facility with appropriate resources:   Identify barriers to discharge with patient and caregiver   Arrange for needed discharge resources and transportation as appropriate   Identify discharge learning needs (meds, wound care, etc)   Arrange for interpreters to assist at discharge as needed   Refer to discharge

## 2025-03-28 NOTE — CARE COORDINATION
Writer informed pt. That she was denied by [facility], for admission.     Pt. States\" she is still unsure is wants to go to SNF on DC, but she will talk to her Mom, about other choices\". She could go home w/ [facility].     Writer did inform her that she will need to work with therapy on a daily basis, for rec for Insurance Approval. She States \"I only refused because I was Having Diarrhea\".

## 2025-03-28 NOTE — CARE COORDINATION
ONGOING DISCHARGE PLAN:    Patient is alert and oriented x4.    Spoke with patient regarding discharge plan and patient confirms that plan is still to follow for potential DC to SNF.     Pt. Is From Home Alone, has been falling & has limited family support.     Pt. Has been accepted w/ [facility].     Pt. Is Unsure if she needs/wants SNF. Pt. Does have FOC List. Choices are :    1) [facility]    2) [facility]    Referrals were sent for both. Following for acceptance.     PT/OT on board. Will follow for Today's Rec/Needs.     Writer attempting Schedule Hospital F/U X 2 w/ Arielle Melton, Phone rang for long time, w/ no answer.     Will continue to follow for additional discharge needs.    If patient is discharged prior to next notation, then this note serves as note for discharge by case management.    Electronically signed by Geri Pringle RN on 3/28/2025 at 12:11 PM

## 2025-03-28 NOTE — PLAN OF CARE
Problem: Chronic Conditions and Co-morbidities  Goal: Patient's chronic conditions and co-morbidity symptoms are monitored and maintained or improved  Outcome: Progressing  Flowsheets (Taken 3/28/2025 0414)  Care Plan - Patient's Chronic Conditions and Co-Morbidity Symptoms are Monitored and Maintained or Improved:   Monitor and assess patient's chronic conditions and comorbid symptoms for stability, deterioration, or improvement   Collaborate with multidisciplinary team to address chronic and comorbid conditions and prevent exacerbation or deterioration   Update acute care plan with appropriate goals if chronic or comorbid symptoms are exacerbated and prevent overall improvement and discharge     Problem: Discharge Planning  Goal: Discharge to home or other facility with appropriate resources  Outcome: Progressing  Flowsheets (Taken 3/28/2025 0414)  Discharge to home or other facility with appropriate resources:   Identify barriers to discharge with patient and caregiver   Arrange for needed discharge resources and transportation as appropriate   Identify discharge learning needs (meds, wound care, etc)   Arrange for interpreters to assist at discharge as needed   Refer to discharge planning if patient needs post-hospital services based on physician order or complex needs related to functional status, cognitive ability or social support system     Problem: Pain  Goal: Verbalizes/displays adequate comfort level or baseline comfort level  Outcome: Progressing  Flowsheets (Taken 3/28/2025 0414)  Verbalizes/displays adequate comfort level or baseline comfort level:   Encourage patient to monitor pain and request assistance   Assess pain using appropriate pain scale   Administer analgesics based on type and severity of pain and evaluate response   Implement non-pharmacological measures as appropriate and evaluate response   Consider cultural and social influences on pain and pain management   Notify Licensed  placenta and umbilical cord

## 2025-03-29 LAB
GLUCOSE BLD-MCNC: 111 MG/DL (ref 65–105)
GLUCOSE BLD-MCNC: 166 MG/DL (ref 65–105)
GLUCOSE BLD-MCNC: 189 MG/DL (ref 65–105)
GLUCOSE BLD-MCNC: 219 MG/DL (ref 65–105)

## 2025-03-29 PROCEDURE — APPSS30 APP SPLIT SHARED TIME 16-30 MINUTES: Performed by: NURSE PRACTITIONER

## 2025-03-29 PROCEDURE — 6360000002 HC RX W HCPCS

## 2025-03-29 PROCEDURE — 2500000003 HC RX 250 WO HCPCS

## 2025-03-29 PROCEDURE — 1200000000 HC SEMI PRIVATE

## 2025-03-29 PROCEDURE — 6370000000 HC RX 637 (ALT 250 FOR IP): Performed by: NURSE PRACTITIONER

## 2025-03-29 PROCEDURE — 99231 SBSQ HOSP IP/OBS SF/LOW 25: CPT | Performed by: INTERNAL MEDICINE

## 2025-03-29 PROCEDURE — 82947 ASSAY GLUCOSE BLOOD QUANT: CPT

## 2025-03-29 PROCEDURE — 6370000000 HC RX 637 (ALT 250 FOR IP)

## 2025-03-29 PROCEDURE — 2580000003 HC RX 258

## 2025-03-29 RX ORDER — MORPHINE SULFATE 2 MG/ML
2 INJECTION, SOLUTION INTRAMUSCULAR; INTRAVENOUS ONCE
Status: COMPLETED | OUTPATIENT
Start: 2025-03-29 | End: 2025-03-29

## 2025-03-29 RX ADMIN — SODIUM CHLORIDE: 9 INJECTION, SOLUTION INTRAVENOUS at 15:26

## 2025-03-29 RX ADMIN — METOPROLOL TARTRATE 50 MG: 50 TABLET, FILM COATED ORAL at 20:40

## 2025-03-29 RX ADMIN — PANCRELIPASE LIPASE, PANCRELIPASE PROTEASE, PANCRELIPASE AMYLASE 40000 UNITS: 20000; 63000; 84000 CAPSULE, DELAYED RELEASE ORAL at 16:10

## 2025-03-29 RX ADMIN — PANCRELIPASE LIPASE, PANCRELIPASE PROTEASE, PANCRELIPASE AMYLASE 40000 UNITS: 20000; 63000; 84000 CAPSULE, DELAYED RELEASE ORAL at 11:57

## 2025-03-29 RX ADMIN — SODIUM CHLORIDE: 9 INJECTION, SOLUTION INTRAVENOUS at 02:00

## 2025-03-29 RX ADMIN — DOXYCYCLINE 100 MG: 100 CAPSULE ORAL at 08:48

## 2025-03-29 RX ADMIN — PREGABALIN 50 MG: 25 CAPSULE ORAL at 15:18

## 2025-03-29 RX ADMIN — PREGABALIN 50 MG: 25 CAPSULE ORAL at 09:03

## 2025-03-29 RX ADMIN — MORPHINE SULFATE 1 MG: 2 INJECTION, SOLUTION INTRAMUSCULAR; INTRAVENOUS at 20:42

## 2025-03-29 RX ADMIN — VALSARTAN 80 MG: 80 TABLET, FILM COATED ORAL at 08:48

## 2025-03-29 RX ADMIN — SUCRALFATE 1 G: 1 TABLET ORAL at 15:18

## 2025-03-29 RX ADMIN — TIZANIDINE 4 MG: 4 TABLET ORAL at 00:11

## 2025-03-29 RX ADMIN — PANCRELIPASE LIPASE, PANCRELIPASE PROTEASE, PANCRELIPASE AMYLASE 20000 UNITS: 20000; 63000; 84000 CAPSULE, DELAYED RELEASE ORAL at 08:48

## 2025-03-29 RX ADMIN — PANTOPRAZOLE SODIUM 40 MG: 40 TABLET, DELAYED RELEASE ORAL at 15:18

## 2025-03-29 RX ADMIN — MORPHINE SULFATE 1 MG: 2 INJECTION, SOLUTION INTRAMUSCULAR; INTRAVENOUS at 06:28

## 2025-03-29 RX ADMIN — PREGABALIN 50 MG: 25 CAPSULE ORAL at 20:49

## 2025-03-29 RX ADMIN — FLECAINIDE ACETATE 100 MG: 100 TABLET ORAL at 20:57

## 2025-03-29 RX ADMIN — METOPROLOL TARTRATE 50 MG: 50 TABLET, FILM COATED ORAL at 08:48

## 2025-03-29 RX ADMIN — DOXYCYCLINE 100 MG: 100 CAPSULE ORAL at 20:40

## 2025-03-29 RX ADMIN — INSULIN LISPRO 2 UNITS: 100 INJECTION, SOLUTION INTRAVENOUS; SUBCUTANEOUS at 20:41

## 2025-03-29 RX ADMIN — FLECAINIDE ACETATE 100 MG: 100 TABLET ORAL at 08:48

## 2025-03-29 RX ADMIN — Medication 10 ML: at 20:54

## 2025-03-29 RX ADMIN — LORAZEPAM 0.5 MG: 0.5 TABLET ORAL at 16:10

## 2025-03-29 RX ADMIN — PANTOPRAZOLE SODIUM 40 MG: 40 TABLET, DELAYED RELEASE ORAL at 06:21

## 2025-03-29 RX ADMIN — MORPHINE SULFATE 1 MG: 2 INJECTION, SOLUTION INTRAMUSCULAR; INTRAVENOUS at 12:34

## 2025-03-29 RX ADMIN — MORPHINE SULFATE 2 MG: 2 INJECTION, SOLUTION INTRAMUSCULAR; INTRAVENOUS at 17:20

## 2025-03-29 RX ADMIN — PRAVASTATIN SODIUM 40 MG: 40 TABLET ORAL at 20:40

## 2025-03-29 RX ADMIN — INSULIN LISPRO 2 UNITS: 100 INJECTION, SOLUTION INTRAVENOUS; SUBCUTANEOUS at 16:14

## 2025-03-29 RX ADMIN — INSULIN GLARGINE 12 UNITS: 100 INJECTION, SOLUTION SUBCUTANEOUS at 20:41

## 2025-03-29 RX ADMIN — Medication 1 TABLET: at 08:48

## 2025-03-29 RX ADMIN — SUCRALFATE 1 G: 1 TABLET ORAL at 06:21

## 2025-03-29 RX ADMIN — ONDANSETRON 4 MG: 2 INJECTION, SOLUTION INTRAMUSCULAR; INTRAVENOUS at 20:49

## 2025-03-29 RX ADMIN — MORPHINE SULFATE 1 MG: 2 INJECTION, SOLUTION INTRAMUSCULAR; INTRAVENOUS at 00:11

## 2025-03-29 RX ADMIN — Medication 1 CAPSULE: at 16:10

## 2025-03-29 RX ADMIN — Medication 1 CAPSULE: at 08:48

## 2025-03-29 RX ADMIN — FONDAPARINUX SODIUM 10 MG: 10 INJECTION, SOLUTION SUBCUTANEOUS at 09:03

## 2025-03-29 RX ADMIN — SUCRALFATE 1 G: 1 TABLET ORAL at 20:40

## 2025-03-29 ASSESSMENT — PAIN SCALES - GENERAL
PAINLEVEL_OUTOF10: 7
PAINLEVEL_OUTOF10: 7
PAINLEVEL_OUTOF10: 8
PAINLEVEL_OUTOF10: 7
PAINLEVEL_OUTOF10: 5

## 2025-03-29 ASSESSMENT — PAIN DESCRIPTION - ORIENTATION
ORIENTATION: LEFT

## 2025-03-29 ASSESSMENT — PAIN DESCRIPTION - DESCRIPTORS
DESCRIPTORS: SHARP;BURNING
DESCRIPTORS: BURNING;SHARP
DESCRIPTORS: BURNING;SHARP

## 2025-03-29 ASSESSMENT — PAIN DESCRIPTION - LOCATION
LOCATION: HIP
LOCATION: HIP;OTHER (COMMENT)
LOCATION: HIP

## 2025-03-29 NOTE — FLOWSHEET NOTE
Physical Therapy Cancel Note      DATE: 3/29/2025    NAME: Krista Chiang  MRN: [id number]   : 1980      Patient not seen this date for Physical Therapy due to:    Patient Declined: Attempted to work with pt at 11:23 am; however, pt adamantly refusing stating \"Im too nauseous\". PEBBLES Busby notified of pt's refusal.       Electronically signed by Megan Monterroso PTA on 3/29/2025 at 11:27 AM

## 2025-03-29 NOTE — PLAN OF CARE
Problem: Chronic Conditions and Co-morbidities  Goal: Patient's chronic conditions and co-morbidity symptoms are monitored and maintained or improved  Outcome: Progressing  Flowsheets (Taken 3/29/2025 0549)  Care Plan - Patient's Chronic Conditions and Co-Morbidity Symptoms are Monitored and Maintained or Improved:   Monitor and assess patient's chronic conditions and comorbid symptoms for stability, deterioration, or improvement   Collaborate with multidisciplinary team to address chronic and comorbid conditions and prevent exacerbation or deterioration   Update acute care plan with appropriate goals if chronic or comorbid symptoms are exacerbated and prevent overall improvement and discharge  Note: Made sure pt. Understood what their conditions was and why it was occurring. Also educated pt. On ways to prevent the condition from worsening and from occurring again.      Problem: Discharge Planning  Goal: Discharge to home or other facility with appropriate resources  Outcome: Progressing  Flowsheets (Taken 3/29/2025 0549)  Discharge to home or other facility with appropriate resources:   Identify barriers to discharge with patient and caregiver   Identify discharge learning needs (meds, wound care, etc)   Refer to discharge planning if patient needs post-hospital services based on physician order or complex needs related to functional status, cognitive ability or social support system   Arrange for needed discharge resources and transportation as appropriate  Note: Inform pt. Of discharge teaching and planned. Instructed pt. To inform me if further teaching need to be done.      Problem: Pain  Goal: Verbalizes/displays adequate comfort level or baseline comfort level  Outcome: Progressing  Flowsheets (Taken 3/29/2025 0549)  Verbalizes/displays adequate comfort level or baseline comfort level:   Administer analgesics based on type and severity of pain and evaluate response   Encourage patient to monitor pain and

## 2025-03-29 NOTE — CARE COORDINATION
ONGOING DISCHARGE PLAN:    Patient is alert and oriented x4.    Spoke with patient regarding discharge plan and patient confirms that plan is still home with [facility]. Refusing SNF.     Cardio consult  +orthos  Bilateral knee high BATOOL Rosario RN notified.    GI consult  Nausea, abd pain    PT/OT    Will continue to follow for additional discharge needs.    If patient is discharged prior to next notation, then this note serves as note for discharge by case management.    Electronically signed by Lynne Lui RN on 3/29/2025 at 11:23 AM

## 2025-03-29 NOTE — PLAN OF CARE
Problem: Chronic Conditions and Co-morbidities  Goal: Patient's chronic conditions and co-morbidity symptoms are monitored and maintained or improved  3/29/2025 0549 by Kriss Pierre, RN  Outcome: Progressing  Flowsheets (Taken 3/29/2025 0549)  Care Plan - Patient's Chronic Conditions and Co-Morbidity Symptoms are Monitored and Maintained or Improved:   Monitor and assess patient's chronic conditions and comorbid symptoms for stability, deterioration, or improvement   Collaborate with multidisciplinary team to address chronic and comorbid conditions and prevent exacerbation or deterioration   Update acute care plan with appropriate goals if chronic or comorbid symptoms are exacerbated and prevent overall improvement and discharge  Note: Made sure pt. Understood what their conditions was and why it was occurring. Also educated pt. On ways to prevent the condition from worsening and from occurring again.      Problem: Discharge Planning  Goal: Discharge to home or other facility with appropriate resources  3/29/2025 0549 by Kriss Pierre, RN  Outcome: Progressing  Flowsheets (Taken 3/29/2025 0549)  Discharge to home or other facility with appropriate resources:   Identify barriers to discharge with patient and caregiver   Identify discharge learning needs (meds, wound care, etc)   Refer to discharge planning if patient needs post-hospital services based on physician order or complex needs related to functional status, cognitive ability or social support system   Arrange for needed discharge resources and transportation as appropriate  Note: Inform pt. Of discharge teaching and planned. Instructed pt. To inform me if further teaching need to be done.      Problem: Pain  Goal: Verbalizes/displays adequate comfort level or baseline comfort level  3/29/2025 1808 by Kehinde Swift, RN  Outcome: Progressing  Flowsheets (Taken 3/29/2025 0549 by Kriss Pierre, RN)  Verbalizes/displays adequate comfort level or

## 2025-03-29 NOTE — CONSULTS
[facility]    GASTROENTEROLOGY CONSULT    Patient:   Krista Chiang   :    1980   Facility:   [facility]   Date:    3/28/2025  Admission Dx:  Syncope and collapse [R55]  Dehydration [E86.0]  Nausea [R11.0]  Other fatigue [R53.83]  Pain of left hip [M25.552]  Requesting physician: Carli Mendoza MD  Reason for consult: nausea and vomiting      SUBJECTIVE:  History of Present Illness:  This is a 44 y.o.   female who was admitted 3/26/2025 with Syncope and collapse [R55]  Dehydration [E86.0]  Nausea [R11.0]  Other fatigue [R53.83]  Pain of left hip [M25.552]. We have been asked to see the patient in consultation by Carli Mendoza MD for  nausea and vomiting. This is a 44 year old female with pmh of alcohol abuse emigdio en y gastric bypass bipolar anxiety DMT2 blood clots on arixtra antiphospholipid syndrome with recurrent dvt on fondaparinux  fatty liver ptsd depression pernicious anemia utis's chronic left hip wound c diff colitis chronic EBV infection suicidal behavior who presented to the ED s/p fall. She states she felt dizzy and then fell. She states she is randomly having non bloody emesis for the last two months. C/o generalized abdominal pain. Taking zofran and scopalamine patch at home with minimal relief. Sometimes she is able to keep her ppi pills down. No fever or chills. Underwent egd on 3/9/25 bx negative.lipase is normal. Has had chronic elevated lfts, currently denies etoh use. Liver w/u has been negative except imaging showing fatty liver and possible reactivation of ebv.. she has had chronic diarrhea, has been evaluated by [facility] who felt that it was due to SIBO from previous antibiotics for her chronic left hip wound. Stool studies negative for c diff and cultures negative earlier this month. Pancreatic elastase <10.     Ct abd shows no acute findings other than fatty liver    Endoscopy  No colonoscopy   Egd with dilation 
Consulted for USG PIV placement. Pt has existing PIV in left forearm. Pt stated that it is painful when medications are infused. Pt also reports that this site has been leaking. Assessment of this area with ultrasound reveals non-compressible radial veins. Pt reports pain when veins are compressed. Recommend venous doppler to rule out DVT. I will pass these finding along to her bedside RN, Kriss and her attending.     I was able to place a 22 gauge 2.5\" B Hall catheter in the RUE cephalic vein. Vasculature for PIV placement is poor. Recommend midline/PICC placement for further VAD's. I assessed bilateral upper arms. Brachial veins bilaterally show sufficient CVR at this time for midline or PICC placement.  
Date:   3/27/2025  Patient name: Krista Chiang  Date of admission:  3/26/2025 10:55 AM  MRN:   [id number]  YOB: 1980  PCP: Arielle [facility] APRN - NP    Reason for Admission: Syncope and collapse [R55]  Dehydration [E86.0]  Nausea [R11.0]  Other fatigue [R53.83]  Pain of left hip [M25.552]    Cardiology consult: Syncope       Referring physician Dr. Ezio Conway    Impression  Admission 3/26/2025 episode of syncope preceded by lightheadedness and blurring of vision no chest pain or palpatory patient was on the floor for 3 hours before she could call for help  Patient also has ongoing nausea and vomiting and diarrhea  Most likely cause of syncope postural hypotension  No history of exertional chest pain, no history of coronary intervention  History of paroxysmal palpitation  History of supraventricular arrhythmias has been on flecainide and beta-blocker  Normal Lexiscan Myoview stress test 4/27/2017  History of DVT, status post IVC filter,  Antiphospholipid syndrome has been on fondaparinux  Hypertension  Hyperlipidemia  Overweight BMI 32  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  On the day of admission she woke up and she was going to restroom she felt dizzy then she fell.  She does not know if she lost consciousness.  She remembers trying to get up from the ground.  She complains of left hip pain and claims that her wound has opened.  She also has been having frequent vomiting in spite of being on Zofran and scopolamine patch.  She 
Testing twice a day.  Please dispense according to patients insurance. 12/20/19   Sandy Rogel MD   Insulin Pen Needle 31G X 5 MM MISC 1 each by Does not apply route daily 12/20/19   Sandy Rogel MD       CURRENT MEDICATIONS:  Current Facility-Administered Medications   Medication Dose Route Frequency Provider Last Rate Last Admin    bumetanide (BUMEX) tablet 2 mg  2 mg Oral Daily PRN Fidel Torre MD        doxycycline monohydrate (MONODOX) capsule 100 mg  100 mg Oral BID Fidel Torre MD   100 mg at 03/27/25 1325    scopolamine (TRANSDERM-SCOP) transdermal patch 1 patch  1 patch TransDERmal Q72H Fidel Torre MD   1 patch at 03/26/25 2014    sucralfate (CARAFATE) tablet 1 g  1 g Oral 3 times per day Fidel Torre MD   1 g at 03/27/25 1322    pravastatin (PRAVACHOL) tablet 40 mg  40 mg Oral Nightly Fidel Torre MD   40 mg at 03/26/25 2013    pantoprazole (PROTONIX) tablet 40 mg  40 mg Oral BID AC Fidel Torre MD   40 mg at 03/27/25 1534    midodrine (PROAMATINE) tablet 2.5 mg  2.5 mg Oral BID PRN Fidel Torre MD        metoprolol tartrate (LOPRESSOR) tablet 50 mg  50 mg Oral BID Fidel Torre MD   50 mg at 03/27/25 0826    melatonin tablet 6 mg  6 mg Oral Nightly PRN Fidel Torre MD        LORazepam (ATIVAN) tablet 0.5 mg  0.5 mg Oral Q8H PRN Fidel Torre MD   0.5 mg at 03/27/25 0826    flecainide (TAMBOCOR) tablet 100 mg  100 mg Oral BID Fidel Torre MD   100 mg at 03/27/25 0830    sodium chloride flush 0.9 % injection 5-40 mL  5-40 mL IntraVENous 2 times per day Fidel Torre MD        sodium chloride flush 0.9 % injection 5-40 mL  5-40 mL IntraVENous PRN Fidel Torre MD        0.9 % sodium chloride infusion   IntraVENous PRN Fidel Torre MD        potassium chloride (KLOR-CON M) extended release tablet 40 mEq  40 mEq Oral PRN Fidel Torre MD        Or    potassium bicarb-citric acid (EFFER-K) effervescent tablet 40 mEq  40 mEq

## 2025-03-30 LAB
ANION GAP SERPL CALCULATED.3IONS-SCNC: 9 MMOL/L (ref 9–16)
BASOPHILS # BLD: 0 K/UL (ref 0–0.2)
BASOPHILS NFR BLD: 1 % (ref 0–2)
BUN SERPL-MCNC: 10 MG/DL (ref 6–20)
CALCIUM SERPL-MCNC: 7.7 MG/DL (ref 8.6–10.4)
CHLORIDE SERPL-SCNC: 112 MMOL/L (ref 98–107)
CO2 SERPL-SCNC: 19 MMOL/L (ref 20–31)
CREAT SERPL-MCNC: 0.7 MG/DL (ref 0.7–1.2)
EOSINOPHIL # BLD: 0.1 K/UL (ref 0–0.4)
EOSINOPHILS RELATIVE PERCENT: 2 % (ref 0–4)
ERYTHROCYTE [DISTWIDTH] IN BLOOD BY AUTOMATED COUNT: 18.4 % (ref 11.5–14.9)
GFR, ESTIMATED: >90 ML/MIN/1.73M2
GLUCOSE BLD-MCNC: 134 MG/DL (ref 65–105)
GLUCOSE BLD-MCNC: 195 MG/DL (ref 65–105)
GLUCOSE BLD-MCNC: 197 MG/DL (ref 65–105)
GLUCOSE BLD-MCNC: 214 MG/DL (ref 65–105)
GLUCOSE BLD-MCNC: 217 MG/DL (ref 65–105)
GLUCOSE SERPL-MCNC: 197 MG/DL (ref 74–99)
HCT VFR BLD AUTO: 35.2 % (ref 36–46)
HGB BLD-MCNC: 10.8 G/DL (ref 12–16)
LYMPHOCYTES NFR BLD: 1.8 K/UL (ref 1–4.8)
LYMPHOCYTES RELATIVE PERCENT: 32 % (ref 24–44)
MAGNESIUM SERPL-MCNC: 1.7 MG/DL (ref 1.6–2.6)
MCH RBC QN AUTO: 29.7 PG (ref 26–34)
MCHC RBC AUTO-ENTMCNC: 30.8 G/DL (ref 31–37)
MCV RBC AUTO: 96.4 FL (ref 80–100)
MONOCYTES NFR BLD: 0.5 K/UL (ref 0.1–1.3)
MONOCYTES NFR BLD: 9 % (ref 1–7)
NEUTROPHILS NFR BLD: 56 % (ref 36–66)
NEUTS SEG NFR BLD: 3.1 K/UL (ref 1.3–9.1)
PLATELET # BLD AUTO: 260 K/UL (ref 150–450)
PMV BLD AUTO: 10 FL (ref 6–12)
POTASSIUM SERPL-SCNC: 3.7 MMOL/L (ref 3.7–5.3)
RBC # BLD AUTO: 3.65 M/UL (ref 4–5.2)
SODIUM SERPL-SCNC: 140 MMOL/L (ref 136–145)
WBC OTHER # BLD: 5.4 K/UL (ref 3.5–11)

## 2025-03-30 PROCEDURE — 6370000000 HC RX 637 (ALT 250 FOR IP)

## 2025-03-30 PROCEDURE — 87186 SC STD MICRODIL/AGAR DIL: CPT

## 2025-03-30 PROCEDURE — 85025 COMPLETE CBC W/AUTO DIFF WBC: CPT

## 2025-03-30 PROCEDURE — APPSS30 APP SPLIT SHARED TIME 16-30 MINUTES: Performed by: NURSE PRACTITIONER

## 2025-03-30 PROCEDURE — 1200000000 HC SEMI PRIVATE

## 2025-03-30 PROCEDURE — 80048 BASIC METABOLIC PNL TOTAL CA: CPT

## 2025-03-30 PROCEDURE — 6370000000 HC RX 637 (ALT 250 FOR IP): Performed by: NURSE PRACTITIONER

## 2025-03-30 PROCEDURE — 99232 SBSQ HOSP IP/OBS MODERATE 35: CPT | Performed by: INTERNAL MEDICINE

## 2025-03-30 PROCEDURE — 87077 CULTURE AEROBIC IDENTIFY: CPT

## 2025-03-30 PROCEDURE — 6360000002 HC RX W HCPCS

## 2025-03-30 PROCEDURE — 87086 URINE CULTURE/COLONY COUNT: CPT

## 2025-03-30 PROCEDURE — 36415 COLL VENOUS BLD VENIPUNCTURE: CPT

## 2025-03-30 PROCEDURE — 2580000003 HC RX 258

## 2025-03-30 PROCEDURE — 83735 ASSAY OF MAGNESIUM: CPT

## 2025-03-30 RX ORDER — INSULIN GLARGINE 100 [IU]/ML
14 INJECTION, SOLUTION SUBCUTANEOUS NIGHTLY
Status: DISCONTINUED | OUTPATIENT
Start: 2025-03-30 | End: 2025-03-31 | Stop reason: HOSPADM

## 2025-03-30 RX ORDER — OXYCODONE AND ACETAMINOPHEN 5; 325 MG/1; MG/1
1 TABLET ORAL EVERY 6 HOURS PRN
Refills: 0 | Status: DISCONTINUED | OUTPATIENT
Start: 2025-03-30 | End: 2025-03-31 | Stop reason: HOSPADM

## 2025-03-30 RX ADMIN — SODIUM CHLORIDE: 9 INJECTION, SOLUTION INTRAVENOUS at 18:05

## 2025-03-30 RX ADMIN — DOXYCYCLINE 100 MG: 100 CAPSULE ORAL at 21:32

## 2025-03-30 RX ADMIN — MORPHINE SULFATE 1 MG: 2 INJECTION, SOLUTION INTRAMUSCULAR; INTRAVENOUS at 17:57

## 2025-03-30 RX ADMIN — PREGABALIN 50 MG: 25 CAPSULE ORAL at 22:07

## 2025-03-30 RX ADMIN — FLECAINIDE ACETATE 100 MG: 100 TABLET ORAL at 10:13

## 2025-03-30 RX ADMIN — PANTOPRAZOLE SODIUM 40 MG: 40 TABLET, DELAYED RELEASE ORAL at 06:05

## 2025-03-30 RX ADMIN — SUCRALFATE 1 G: 1 TABLET ORAL at 15:15

## 2025-03-30 RX ADMIN — DOXYCYCLINE 100 MG: 100 CAPSULE ORAL at 10:12

## 2025-03-30 RX ADMIN — Medication 1 CAPSULE: at 16:18

## 2025-03-30 RX ADMIN — PANCRELIPASE LIPASE, PANCRELIPASE PROTEASE, PANCRELIPASE AMYLASE 40000 UNITS: 20000; 63000; 84000 CAPSULE, DELAYED RELEASE ORAL at 10:12

## 2025-03-30 RX ADMIN — INSULIN LISPRO 2 UNITS: 100 INJECTION, SOLUTION INTRAVENOUS; SUBCUTANEOUS at 06:19

## 2025-03-30 RX ADMIN — FONDAPARINUX SODIUM 10 MG: 10 INJECTION, SOLUTION SUBCUTANEOUS at 10:13

## 2025-03-30 RX ADMIN — VALSARTAN 80 MG: 80 TABLET, FILM COATED ORAL at 10:13

## 2025-03-30 RX ADMIN — Medication 1 CAPSULE: at 10:12

## 2025-03-30 RX ADMIN — ACETAMINOPHEN 650 MG: 325 TABLET, FILM COATED ORAL at 21:34

## 2025-03-30 RX ADMIN — PANTOPRAZOLE SODIUM 40 MG: 40 TABLET, DELAYED RELEASE ORAL at 15:15

## 2025-03-30 RX ADMIN — PRAVASTATIN SODIUM 40 MG: 40 TABLET ORAL at 21:32

## 2025-03-30 RX ADMIN — PREGABALIN 50 MG: 25 CAPSULE ORAL at 15:15

## 2025-03-30 RX ADMIN — INSULIN LISPRO 2 UNITS: 100 INJECTION, SOLUTION INTRAVENOUS; SUBCUTANEOUS at 16:18

## 2025-03-30 RX ADMIN — METOPROLOL TARTRATE 50 MG: 50 TABLET, FILM COATED ORAL at 10:12

## 2025-03-30 RX ADMIN — FLECAINIDE ACETATE 100 MG: 100 TABLET ORAL at 21:32

## 2025-03-30 RX ADMIN — MORPHINE SULFATE 1 MG: 2 INJECTION, SOLUTION INTRAMUSCULAR; INTRAVENOUS at 10:59

## 2025-03-30 RX ADMIN — Medication 1 TABLET: at 10:12

## 2025-03-30 RX ADMIN — HYDROMORPHONE HYDROCHLORIDE 0.5 MG: 1 INJECTION, SOLUTION INTRAMUSCULAR; INTRAVENOUS; SUBCUTANEOUS at 16:55

## 2025-03-30 RX ADMIN — LORAZEPAM 0.5 MG: 0.5 TABLET ORAL at 00:15

## 2025-03-30 RX ADMIN — SUCRALFATE 1 G: 1 TABLET ORAL at 21:32

## 2025-03-30 RX ADMIN — MORPHINE SULFATE 1 MG: 2 INJECTION, SOLUTION INTRAMUSCULAR; INTRAVENOUS at 04:51

## 2025-03-30 RX ADMIN — METOPROLOL TARTRATE 50 MG: 50 TABLET, FILM COATED ORAL at 21:32

## 2025-03-30 RX ADMIN — OXYCODONE HYDROCHLORIDE AND ACETAMINOPHEN 1 TABLET: 5; 325 TABLET ORAL at 23:20

## 2025-03-30 RX ADMIN — PREGABALIN 50 MG: 25 CAPSULE ORAL at 10:12

## 2025-03-30 RX ADMIN — PANCRELIPASE LIPASE, PANCRELIPASE PROTEASE, PANCRELIPASE AMYLASE 40000 UNITS: 20000; 63000; 84000 CAPSULE, DELAYED RELEASE ORAL at 12:20

## 2025-03-30 RX ADMIN — INSULIN LISPRO 2 UNITS: 100 INJECTION, SOLUTION INTRAVENOUS; SUBCUTANEOUS at 21:32

## 2025-03-30 RX ADMIN — PANCRELIPASE LIPASE, PANCRELIPASE PROTEASE, PANCRELIPASE AMYLASE 40000 UNITS: 20000; 63000; 84000 CAPSULE, DELAYED RELEASE ORAL at 16:18

## 2025-03-30 RX ADMIN — LORAZEPAM 0.5 MG: 0.5 TABLET ORAL at 18:30

## 2025-03-30 RX ADMIN — INSULIN GLARGINE 14 UNITS: 100 INJECTION, SOLUTION SUBCUTANEOUS at 21:32

## 2025-03-30 RX ADMIN — LORAZEPAM 0.5 MG: 0.5 TABLET ORAL at 10:27

## 2025-03-30 RX ADMIN — SUCRALFATE 1 G: 1 TABLET ORAL at 06:05

## 2025-03-30 ASSESSMENT — PAIN DESCRIPTION - LOCATION
LOCATION: HIP
LOCATION: ABDOMEN
LOCATION: ABDOMEN

## 2025-03-30 ASSESSMENT — PAIN DESCRIPTION - ORIENTATION
ORIENTATION: LEFT;UPPER
ORIENTATION: LEFT;UPPER
ORIENTATION: LEFT

## 2025-03-30 ASSESSMENT — PAIN SCALES - GENERAL
PAINLEVEL_OUTOF10: 7
PAINLEVEL_OUTOF10: 7
PAINLEVEL_OUTOF10: 8
PAINLEVEL_OUTOF10: 7
PAINLEVEL_OUTOF10: 5
PAINLEVEL_OUTOF10: 7
PAINLEVEL_OUTOF10: 5

## 2025-03-30 ASSESSMENT — PAIN DESCRIPTION - DESCRIPTORS
DESCRIPTORS: BURNING
DESCRIPTORS: BURNING;SHARP

## 2025-03-30 NOTE — DISCHARGE INSTRUCTIONS
Please make an appointment with your PCP soon as possible    Please make an appointment with Dr. Marques in 2 weeks    Please start taking 1 capsule of Zenpep with snacks and 2 capsules of Zenpep with meals    -If you begin to experience any symptoms such as chest pain, shortness of breath, nausea, vomiting, dizziness, drowsiness, abdominal pain, loss of consciousness, or any other symptoms you find concerning please return to the ED for follow-up evaluation.  -If you have been given medication please take them as prescribed. Do not take more medication than recommended at any given time. Finish all antibiotic  -Please follow-up with your primary care provider within 7 days for continued care.   -Please feel free return to the hospital if your symptoms worsen or any new concerning symptoms develop.  Follow-up with your primary care physician as needed for all other the concerns.

## 2025-03-30 NOTE — PLAN OF CARE
Problem: Chronic Conditions and Co-morbidities  Goal: Patient's chronic conditions and co-morbidity symptoms are monitored and maintained or improved  Outcome: Progressing  Flowsheets (Taken 3/30/2025 0445)  Care Plan - Patient's Chronic Conditions and Co-Morbidity Symptoms are Monitored and Maintained or Improved:   Monitor and assess patient's chronic conditions and comorbid symptoms for stability, deterioration, or improvement   Collaborate with multidisciplinary team to address chronic and comorbid conditions and prevent exacerbation or deterioration   Update acute care plan with appropriate goals if chronic or comorbid symptoms are exacerbated and prevent overall improvement and discharge  Note: Made sure pt. Understood what their conditions was and why it was occurring. Also educated pt. On ways to prevent the condition from worsening and from occurring again.      Problem: Pain  Goal: Verbalizes/displays adequate comfort level or baseline comfort level  3/30/2025 0445 by Kriss Pierre, RN  Outcome: Progressing  Flowsheets (Taken 3/30/2025 0445)  Verbalizes/displays adequate comfort level or baseline comfort level:   Consider cultural and social influences on pain and pain management   Encourage patient to monitor pain and request assistance   Administer analgesics based on type and severity of pain and evaluate response   Assess pain using appropriate pain scale   Implement non-pharmacological measures as appropriate and evaluate response   Notify Licensed Independent Practitioner if interventions unsuccessful or patient reports new pain  Note: PT. Received pain meds in timely manner. Teach pt. Non-pharm. Methods to handle pain.      Problem: Skin/Tissue Integrity  Goal: Skin integrity remains intact  Description: 1.  Monitor for areas of redness and/or skin breakdown  2.  Assess vascular access sites hourly  3.  Every 4-6 hours minimum:  Change oxygen saturation probe site  4.  Every 4-6 hours:  If on

## 2025-03-30 NOTE — PLAN OF CARE
Problem: Chronic Conditions and Co-morbidities  Goal: Patient's chronic conditions and co-morbidity symptoms are monitored and maintained or improved  3/30/2025 0445 by Kriss Pierre, RN  Outcome: Progressing  Flowsheets (Taken 3/30/2025 0445)  Care Plan - Patient's Chronic Conditions and Co-Morbidity Symptoms are Monitored and Maintained or Improved:   Monitor and assess patient's chronic conditions and comorbid symptoms for stability, deterioration, or improvement   Collaborate with multidisciplinary team to address chronic and comorbid conditions and prevent exacerbation or deterioration   Update acute care plan with appropriate goals if chronic or comorbid symptoms are exacerbated and prevent overall improvement and discharge  Note: Made sure pt. Understood what their conditions was and why it was occurring. Also educated pt. On ways to prevent the condition from worsening and from occurring again.      Problem: Pain  Goal: Verbalizes/displays adequate comfort level or baseline comfort level  3/30/2025 1644 by Kehinde Swift, RN  Outcome: Progressing  Note: Assess the location, characteristics, onset, duration, frequency, quality, and severity of pain. Encourage immediate report of pain. Use appropriate pain scale to rate pain. Manage pain using nonpharmacologic/pharmacologic interventions.   3/30/2025 0445 by Kriss Pierre, RN  Outcome: Progressing  Flowsheets (Taken 3/30/2025 0445)  Verbalizes/displays adequate comfort level or baseline comfort level:   Consider cultural and social influences on pain and pain management   Encourage patient to monitor pain and request assistance   Administer analgesics based on type and severity of pain and evaluate response   Assess pain using appropriate pain scale   Implement non-pharmacological measures as appropriate and evaluate response   Notify Licensed Independent Practitioner if interventions unsuccessful or patient reports new pain  Note: PT. Received pain meds

## 2025-03-30 NOTE — CARE COORDINATION
ONGOING DISCHARGE PLAN:    Patient is alert and oriented x4.    Spoke with patient regarding discharge plan and patient confirms that plan is still home with [facility]. Refusing SNF.     Cardio consult  +orthos  Bilateral knee high BATOOL saab-Kehinde ROGEL notified.    GI consult  Nausea, abd pain  Zenpep started new this admit, follow for discharge. (Expensive and needs prior auth)     PT/OT    Will continue to follow for additional discharge needs.    If patient is discharged prior to next notation, then this note serves as note for discharge by case management.    Electronically signed by Lynne Lui RN on 3/30/2025 at 11:54 AM

## 2025-03-31 ENCOUNTER — APPOINTMENT (OUTPATIENT)
Dept: VASCULAR LAB | Age: 45
DRG: 312 | End: 2025-03-31
Payer: COMMERCIAL

## 2025-03-31 VITALS
BODY MASS INDEX: 31.07 KG/M2 | DIASTOLIC BLOOD PRESSURE: 80 MMHG | OXYGEN SATURATION: 99 % | HEART RATE: 64 BPM | WEIGHT: 205 LBS | TEMPERATURE: 98.4 F | HEIGHT: 68 IN | SYSTOLIC BLOOD PRESSURE: 140 MMHG | RESPIRATION RATE: 16 BRPM

## 2025-03-31 LAB
ANION GAP SERPL CALCULATED.3IONS-SCNC: 7 MMOL/L (ref 9–16)
BUN SERPL-MCNC: 8 MG/DL (ref 6–20)
CALCIUM SERPL-MCNC: 7.7 MG/DL (ref 8.6–10.4)
CHLORIDE SERPL-SCNC: 113 MMOL/L (ref 98–107)
CO2 SERPL-SCNC: 20 MMOL/L (ref 20–31)
CREAT SERPL-MCNC: 0.6 MG/DL (ref 0.7–1.2)
GFR, ESTIMATED: >90 ML/MIN/1.73M2
GLUCOSE BLD-MCNC: 109 MG/DL (ref 65–105)
GLUCOSE BLD-MCNC: 118 MG/DL (ref 65–105)
GLUCOSE BLD-MCNC: 217 MG/DL (ref 65–105)
GLUCOSE BLD-MCNC: 80 MG/DL (ref 65–105)
GLUCOSE SERPL-MCNC: 141 MG/DL (ref 74–99)
POTASSIUM SERPL-SCNC: 3.7 MMOL/L (ref 3.7–5.3)
SODIUM SERPL-SCNC: 140 MMOL/L (ref 136–145)

## 2025-03-31 PROCEDURE — 93971 EXTREMITY STUDY: CPT

## 2025-03-31 PROCEDURE — 80048 BASIC METABOLIC PNL TOTAL CA: CPT

## 2025-03-31 PROCEDURE — 6370000000 HC RX 637 (ALT 250 FOR IP)

## 2025-03-31 PROCEDURE — 6360000002 HC RX W HCPCS

## 2025-03-31 PROCEDURE — 2580000003 HC RX 258

## 2025-03-31 PROCEDURE — 36415 COLL VENOUS BLD VENIPUNCTURE: CPT

## 2025-03-31 PROCEDURE — 82947 ASSAY GLUCOSE BLOOD QUANT: CPT

## 2025-03-31 PROCEDURE — 6370000000 HC RX 637 (ALT 250 FOR IP): Performed by: NURSE PRACTITIONER

## 2025-03-31 RX ORDER — SCOPOLAMINE 1 MG/3D
1 PATCH, EXTENDED RELEASE TRANSDERMAL
Qty: 20 PATCH | Refills: 0 | Status: SHIPPED | OUTPATIENT
Start: 2025-03-31 | End: 2025-05-30

## 2025-03-31 RX ORDER — OXYCODONE AND ACETAMINOPHEN 5; 325 MG/1; MG/1
1 TABLET ORAL EVERY 8 HOURS PRN
Qty: 9 TABLET | Refills: 0 | Status: SHIPPED | OUTPATIENT
Start: 2025-03-31 | End: 2025-04-03

## 2025-03-31 RX ADMIN — SUCRALFATE 1 G: 1 TABLET ORAL at 05:44

## 2025-03-31 RX ADMIN — PREGABALIN 50 MG: 25 CAPSULE ORAL at 08:40

## 2025-03-31 RX ADMIN — FONDAPARINUX SODIUM 10 MG: 10 INJECTION, SOLUTION SUBCUTANEOUS at 08:41

## 2025-03-31 RX ADMIN — VALSARTAN 80 MG: 80 TABLET, FILM COATED ORAL at 08:41

## 2025-03-31 RX ADMIN — INSULIN LISPRO 2 UNITS: 100 INJECTION, SOLUTION INTRAVENOUS; SUBCUTANEOUS at 16:25

## 2025-03-31 RX ADMIN — DOXYCYCLINE 100 MG: 100 CAPSULE ORAL at 08:40

## 2025-03-31 RX ADMIN — OXYCODONE HYDROCHLORIDE AND ACETAMINOPHEN 1 TABLET: 5; 325 TABLET ORAL at 05:44

## 2025-03-31 RX ADMIN — PREGABALIN 50 MG: 25 CAPSULE ORAL at 15:09

## 2025-03-31 RX ADMIN — LORAZEPAM 0.5 MG: 0.5 TABLET ORAL at 03:22

## 2025-03-31 RX ADMIN — SODIUM CHLORIDE: 9 INJECTION, SOLUTION INTRAVENOUS at 11:58

## 2025-03-31 RX ADMIN — Medication 1 CAPSULE: at 08:40

## 2025-03-31 RX ADMIN — PANTOPRAZOLE SODIUM 40 MG: 40 TABLET, DELAYED RELEASE ORAL at 05:44

## 2025-03-31 RX ADMIN — SUCRALFATE 1 G: 1 TABLET ORAL at 15:10

## 2025-03-31 RX ADMIN — PANCRELIPASE LIPASE, PANCRELIPASE PROTEASE, PANCRELIPASE AMYLASE 40000 UNITS: 20000; 63000; 84000 CAPSULE, DELAYED RELEASE ORAL at 08:41

## 2025-03-31 RX ADMIN — Medication 1 CAPSULE: at 15:09

## 2025-03-31 RX ADMIN — PANTOPRAZOLE SODIUM 40 MG: 40 TABLET, DELAYED RELEASE ORAL at 15:09

## 2025-03-31 RX ADMIN — PANCRELIPASE LIPASE, PANCRELIPASE PROTEASE, PANCRELIPASE AMYLASE 40000 UNITS: 20000; 63000; 84000 CAPSULE, DELAYED RELEASE ORAL at 15:10

## 2025-03-31 RX ADMIN — OXYCODONE HYDROCHLORIDE AND ACETAMINOPHEN 1 TABLET: 5; 325 TABLET ORAL at 11:53

## 2025-03-31 RX ADMIN — Medication 1 TABLET: at 08:40

## 2025-03-31 RX ADMIN — FLECAINIDE ACETATE 100 MG: 100 TABLET ORAL at 08:41

## 2025-03-31 RX ADMIN — METOPROLOL TARTRATE 50 MG: 50 TABLET, FILM COATED ORAL at 08:40

## 2025-03-31 RX ADMIN — LORAZEPAM 0.5 MG: 0.5 TABLET ORAL at 11:53

## 2025-03-31 RX ADMIN — PANCRELIPASE LIPASE, PANCRELIPASE PROTEASE, PANCRELIPASE AMYLASE 40000 UNITS: 20000; 63000; 84000 CAPSULE, DELAYED RELEASE ORAL at 11:53

## 2025-03-31 ASSESSMENT — PAIN SCALES - GENERAL
PAINLEVEL_OUTOF10: 5
PAINLEVEL_OUTOF10: 7
PAINLEVEL_OUTOF10: 8

## 2025-03-31 ASSESSMENT — ENCOUNTER SYMPTOMS
VOMITING: 1
SORE THROAT: 0
RHINORRHEA: 0
NAUSEA: 1
ABDOMINAL PAIN: 1
DIARRHEA: 0
SHORTNESS OF BREATH: 0
COUGH: 0

## 2025-03-31 ASSESSMENT — PAIN DESCRIPTION - DESCRIPTORS
DESCRIPTORS: ACHING
DESCRIPTORS: SHARP

## 2025-03-31 ASSESSMENT — PAIN DESCRIPTION - ORIENTATION
ORIENTATION: LEFT
ORIENTATION: LEFT;UPPER

## 2025-03-31 ASSESSMENT — PAIN DESCRIPTION - LOCATION
LOCATION: ABDOMEN
LOCATION: HIP

## 2025-03-31 NOTE — PROGRESS NOTES
GI Progress notes    3/29/2025   10:30 AM    Name:  Krista Chiang  MRN:    [id number]     Acct:     [id number]   Room:  2048/2048-01   Day: 3     Admit Date: 3/26/2025 10:55 AM  PCP: Arielle Melton APRN - NP    Subjective:     C/C:   Chief Complaint   Patient presents with    Fall    Hip Pain     Left      Headache    Dizziness       Interval History: Status: improved.     Patient seen and examined.  No acute events overnight.  Tolerating breakfast well.   No nausea, vomiting.  Abdominal pain improving.    ROS:  Constitutional: negative for chills, fevers and sweats  Gastrointestinal: negative for abdominal pain, constipation, nausea and vomiting  Neurological: negative for dizziness and headaches    Medications:     Allergies:   Allergies   Allergen Reactions    Aripiprazole Itching and Swelling     Other reaction(s): Other  Abilify    Aspirin      Patient is on chronic anticoagulation  Patient is on chronic anticoagulation  Patient is on chronic anticoagulation    Betadine [Povidone Iodine] Rash    Celebrex [Celecoxib] Itching    Celexa [Citalopram Hydrobromide]     Citalopram Itching, Rash and Swelling     Other reaction(s): Unknown    Furosemide      Tolerates Bumex  Other reaction(s): Unknown    Nitrofurantoin      Other reaction(s): Unknown    Tricyclic Antidepressants     Codeine Rash and Hives     Other reaction(s): Unknown    Lithium Nausea And Vomiting     Other reaction(s): Unknown    Paroxetine Rash and Hives     Other reaction(s): Unknown  Paxil    Paxil [Paroxetine Hcl] Rash    Povidone Iodine Rash     Other reaction(s): Unknown    Sertraline Hives and Rash    Tape [Adhesive Tape] Rash     Paper tape    Tegretol [Carbamazepine] Rash       Current Meds: lipase-protease-amylase (ZENPEP) 88473-39702 units delayed release capsule 20,000 Units, TID WC  bumetanide (BUMEX) tablet 2 mg, Daily PRN  doxycycline monohydrate (MONODOX) capsule 100 mg, BID  scopolamine (TRANSDERM-SCOP) transdermal patch 
   GI Progress notes    3/30/2025   7:42 AM    Name:  Krista Chiang  MRN:    [id number]     Acct:     [id number]   Room:  2048/2048-[facility] Day: 4     Admit Date: 3/26/2025 10:55 AM  PCP: Arielle Melton APRN - NP    Subjective:     C/C:   Chief Complaint   Patient presents with    Fall    Hip Pain     Left      Headache    Dizziness       Interval History: Status: not changed.     Patient seen and examined.  No acute events overnight.  Had 1 BM overnight  Continues to have some intermittent nausea.  Reports she had vomiting yesterday.  Denies any hematemesis, coffee-ground emesis  Also reports some mild LUQ pain.  Lipase was 7    ROS:  Constitutional: negative for chills, fevers and sweats  Respiratory: negative for cough, dyspnea on exertion, hemoptysis, shortness of breath and wheezing  Cardiovascular: negative for chest pain, chest pressure/discomfort, dyspnea, lower extremity edema and palpitations  Gastrointestinal: negative for abdominal pain, constipation, diarrhea, nausea and vomiting  Neurological: negative for dizziness and headaches    Medications:     Allergies:   Allergies   Allergen Reactions    Aripiprazole Itching and Swelling     Other reaction(s): Other  Abilify    Aspirin      Patient is on chronic anticoagulation  Patient is on chronic anticoagulation  Patient is on chronic anticoagulation    Betadine [Povidone Iodine] Rash    Celebrex [Celecoxib] Itching    Celexa [Citalopram Hydrobromide]     Citalopram Itching, Rash and Swelling     Other reaction(s): Unknown    Furosemide      Tolerates Bumex  Other reaction(s): Unknown    Nitrofurantoin      Other reaction(s): Unknown    Tricyclic Antidepressants     Codeine Rash and Hives     Other reaction(s): Unknown    Lithium Nausea And Vomiting     Other reaction(s): Unknown    Paroxetine Rash and Hives     Other reaction(s): Unknown  Paxil    Paxil [Paroxetine Hcl] Rash    Povidone Iodine Rash     Other reaction(s): Unknown    Sertraline Hives 
  [facility]  IN-PATIENT SERVICE  [facility]    PROGRESS NOTE             3/31/2025    7:54 AM    Name:   Krista Chiang  MRN:     [id number]     Acct:      [id number]   Room:   2048/2048-01   Day:  5  Admit Date:  3/26/2025 10:55 AM    PCP:  Arielle Melton APRN - NP  Code Status:  Full Code    Subjective:     C/C:   Chief Complaint   Patient presents with    Fall    Hip Pain     Left      Headache    Dizziness     Interval History Status: not changed.    Patient was seen at bedside.  Is endorsing a pain in her epigastric area that radiates across the left upper quadrant.  Otherwise, no new complaints.  She is ready for discharge.  She is requesting pain medication to get her through to her appointment on Friday with her PCP.  She states that she vomited up her meals yesterday.    Brief History:     Ms Chiang is a 44-year-old female patient who presents after a fall.  She has significant past medical history of bariatric surgery, left hip arthroplasty (complicated with infection, hardware removal), SVT on flecainide and beta-blocker, uncontrolled type 2 diabetes, DVT s/p IVC filter, antiphospholipid syndrome (on fondaparinux).     The patient presented today after falling earlier this morning.  She woke up and was going to the restroom, she felt dizzy, and then she fell.  She does not know if she lost consciousness or if she hit her head.  She remembers trying to get up from the ground.  She complains of left hip pain and claims that her wound has opened.  She states vomiting 7-8 per day, despite using Zofran and scopolamine patch, sometimes she vomits her medications.  She has decreased oral intake for the past week.  Patient also reports increased urinary frequency and urgency, and dysuria. She complains of pressure on the center of her chest and occasional palpitations, denies chest pain or shortness of breath. In ER, CBC, BMP, UA unremarkable.  [facility] 
  Physician Progress Note      PATIENT:               CHRISTOPHER THURMAN  [facility] #:                  [id number]  :                       1980  ADMIT DATE:       3/26/2025 10:55 AM  DISCH DATE:  RESPONDING  PROVIDER #:        Ezio Conway MD          QUERY TEXT:    Pt admitted with Syncope and fall. Per ED Physician: Dehydration. Per Cardio   consult:  Most likely cause of syncope postural hypotension. Feeling tired   still having diarrhea. Pt with Intractable Nausea and Vomiting. On 3/14   Endoscopy- lower esophagus showed yellow exudate, to rule Candida and 2 cm   ulcer-Continuing home dose Carafate and lactobacillus. If possible, please   document in progress notes and discharge summary:    The medical record reflects the following:  Risk Factors: Syncope and fall, fibromyalgia.  Clinical Indicators: Per ED Physician: Dehydration. Per Cardio consult:  Most   likely cause of syncope postural hypotension. Feeling tired still having   diarrhea.  On 3/14 Endoscopy- lower esophagus showed yellow exudate, to rule   Candida and 2 cm ulcer-Continuing home dose Carafate and lactobacillus.  Treatment: 0.9 NS 1000 ml bolus & Will discuss with the patient to warm up   before she get out of bed or chair after long rest to prevent postural   hypotension  Options provided:  -- Syncope and fall due to postural hypotension confirmed present on admission  -- Syncope and fall due to dehydration confirmed not present on admission  -- Syncope and fall related to N/V due to Candida and 2 cm lower esophagus   ulcer.  -- Syncope and fall due to, please specify the cause  -- Other - I will add my own diagnosis  -- Disagree - Not applicable / Not valid  -- Disagree - Clinically unable to determine / Unknown  -- Refer to Clinical Documentation Reviewer    PROVIDER RESPONSE TEXT:    Syncope and fall due to postural hypotension confirmed present on admission.    Query created by: Radha Meyers on 3/28/2025 8:32 
CLINICAL PHARMACY NOTE: MEDS TO BEDS    Total # of Prescriptions Filled: 1   The following medications were delivered to the patient:  Oxycodone/APAP 5-325MG Tablets     Additional Documentation:  Delivered to the PT per Kehinde ROGEL at 4:46PM 3/31/25. Scopolamine Patches too soon thru ins, placed on hold. Zenpep transferred to [facility] on [address].   
Comprehensive Nutrition Assessment    Type and Reason for Visit:  Initial, Positive nutrition screen    Nutrition Recommendations/Plan:   Modify diet order to send double protein portions at meals  Send low calorie, high protein oral nutritional supplement three times daily with meals, no chocolate per patient preference     Malnutrition Assessment:  Malnutrition Status:  At risk for malnutrition (03/27/25 1201)    Context:  Acute Illness     Findings of the 6 clinical characteristics of malnutrition:  Energy Intake:  Mild decrease in energy intake (due to emesis)  Weight Loss:  Mild weight loss     Body Fat Loss:  No body fat loss     Muscle Mass Loss:  No muscle mass loss    Fluid Accumulation:  No fluid accumulation     Strength:  Not Performed    Nutrition Assessment:    Pt admitted for syncope and collapse, dehydration with Hx Awa-en-Y gastric bypass (2013), alcohol abuse, alcoholic hepatitis, Painting's esophagus, fibromyalgia, GERD, pulmonary embolism, HTN and DM.  Weight records show mostly stable weight over the past year, ranging from 90.3 kg to 95.3 kg, though Pt did have a slight loss x 3 months (2.4%) though weight is up from 1 year ago.  Pt noted to have multiple wounds (diabetic), and reports she does drink some type of oral nutritional supplement at SNF, usually twice daily.  Pt also reports she frequently receives double protein portions at meals and is asking for double protein here as well.  She is also asking for ONS while here.  Will leave note in [facility] for double protein portions and will send low calorie, high protein ONS three times daily with meals for additional protein intake, no chocolate per patient request.  Pt also reports she has been having intermittent nausea and vomiting for the past several weeks, and EGD has shown multiple gastric ulcers.  Patient reports no noticeable pattern to her N/V, and agrees she does thrown up her foods/beverages, sometimes nearly immediately after 
DATE: 3/28/2025    NAME: Krista Chiang  MRN: [id number]   : 1980    Patient not seen this date for Physical Therapy due to:      [] Cancel by RN or physician due to:    [] Hemodialysis    [] Critical Lab Value Level     [] Blood transfusion in progress    [] Acute or unstable cardiovascular status   _MAP < 55 or more than >115  _HR < 40 or > 130    [] Acute or unstable pulmonary status   -FiO2 > 60%   _RR < 5 or >40    _O2 sats < 85%    [] Strict Bedrest    [] Off Unit for surgery or procedure    [] Off Unit for testing       [] Pending imaging to R/O fracture    [x] Refusal by Patient, stating she has been nauseated, getting dizzy upon standing, request we check back at a later date.     [] Other      [] PT being discontinued at this time. Patient independent. No further needs.     [] PT being discontinued at this time as the patient has been transferred to hospice care. No further needs.      Gosia Junior, PTA    
DATE: 3/31/2025    NAME: Krista Chiang  MRN: [id number]   : 1980    Patient not seen this date for Physical Therapy due to:      [] Cancel by RN or physician due to:    [] Hemodialysis    [] Critical Lab Value Level     [] Blood transfusion in progress    [] Acute or unstable cardiovascular status   _MAP < 55 or more than >115  _HR < 40 or > 130    [] Acute or unstable pulmonary status   -FiO2 > 60%   _RR < 5 or >40    _O2 sats < 85%    [] Strict Bedrest    [] Off Unit for surgery or procedure    [] Off Unit for testing       [] Pending imaging to R/O fracture    [x] Refusal by Patient, nauseated.     [] Other      [] PT being discontinued at this time. Patient independent. No further needs.     [] PT being discontinued at this time as the patient has been transferred to hospice care. No further needs.      Gosia Junior, PTA    
Date:   3/28/2025  Patient name: Krista Chiang  Date of admission:  3/26/2025 10:55 AM  MRN:   [id number]  YOB: 1980  PCP: Arielle Melton APRN - NP    Reason for Admission: Syncope and collapse [R55]  Dehydration [E86.0]  Nausea [R11.0]  Other fatigue [R53.83]  Pain of left hip [M25.552]    Cardiology follow-up: Syncope       Referring physician Dr. Ezio Conway     Impression  Admission 3/26/2025 episode of syncope preceded by lightheadedness and blurring of vision no chest pain or palpatory patient was on the floor for 3 hours before she could call for help  Patient also has ongoing nausea and vomiting and diarrhea  Most likely cause of syncope postural hypotension  No history of exertional chest pain, no history of coronary intervention  History of paroxysmal palpitation  History of supraventricular arrhythmias has been on flecainide and beta-blocker  Normal Lexiscan Myoview stress test 4/27/2017  History of DVT, status post IVC filter,  Antiphospholipid syndrome has been on fondaparinux  Hypertension  Hyperlipidemia  Overweight BMI 32  Left hip arthroplasty complicated by infection, removal of hardware        History of present illness     44-year-old female who lives alone mother of 1 daughter with a past medical history of bariatric surgery, left hip arthroplasty complicated with infection of hardware removal, supraventricular tachyarrhythmias on flecainide and beta-blocker, uncontrolled type 2 diabetes mellitus, history of DVT status post IVC filter, antiphospholipid syndrome on fondaparinux got hospitalized on 3/26/2025 status post fall.  On the day of admission she woke up and she was going to restroom she felt dizzy then she fell.  She does not know if she lost consciousness.  She remembers trying to get up from the ground.  She complains of left hip pain and claims that her wound has opened.  She also has been having frequent vomiting in spite of being on Zofran and scopolamine 
Date:   3/30/2025  Patient name: Krista Chiang  Date of admission:  3/26/2025 10:55 AM  MRN:   [id number]  YOB: 1980  PCP: Arielle Melton APRN - NP    Reason for Admission: Syncope and collapse [R55]  Dehydration [E86.0]  Nausea [R11.0]  Other fatigue [R53.83]  Pain of left hip [M25.552]    Cardiology follow-up: Syncope       Referring physician Dr. Ezio Conway     Impression  Admission 3/26/2025 episode of syncope preceded by lightheadedness and blurring of vision no chest pain or palpatory patient was on the floor for 3 hours before she could call for help  Patient also has ongoing nausea and vomiting and diarrhea  Most likely cause of syncope postural hypotension  No history of exertional chest pain, no history of coronary intervention  History of paroxysmal palpitation  History of supraventricular arrhythmias has been on flecainide and beta-blocker  Normal Lexiscan Myoview stress test 4/27/2017  History of DVT, status post IVC filter,  Antiphospholipid syndrome has been on fondaparinux  Hypertension  Hyperlipidemia  Diabetes mellitus hemoglobin A1c 10.4 on 3/11/2025  Alcohol consumption ethanol level 84 and ethanol percent 0.084 on 3/22/2025  Overweight BMI 32  Left hip arthroplasty complicated by infection, removal of hardware    History of present illness     44-year-old female who lives alone mother of 1 daughter with a past medical history of bariatric surgery, left hip arthroplasty complicated with infection of hardware removal, supraventricular tachyarrhythmias on flecainide and beta-blocker, uncontrolled type 2 diabetes mellitus, history of DVT status post IVC filter, antiphospholipid syndrome on fondaparinux got hospitalized on 3/26/2025 status post fall.  On the day of admission she woke up and she was going to restroom she felt dizzy then she fell.  She does not know if she lost consciousness.  She remembers trying to get up from the ground.  She complains of left hip pain and 
[facility]   OCCUPATIONAL THERAPY MISSED TREATMENT NOTE   INPATIENT   Date: 3/31/25  Patient Name: Krista Chiang       Room:   MRN: [id number]   Account #: [id number]    : 1980  (44 y.o.)  Gender: female   Referring Practitioner: Fidel Torre MD  Diagnosis: fatigue/fall             REASON FOR MISSED TREATMENT:  OT treatment attempted. Pt reports that she is very nauseous and cannot participate today despite extensive education. OT will continue to follow.    -    1341      Electronically signed by KATHLEEN Barrientos on 3/31/25 at 3:35 PM EDT   
Occupational Therapy  [facility]   OCCUPATIONAL THERAPY MISSED TREATMENT NOTE   INPATIENT   Date: 3/28/25  Patient Name: Krista Chiang       Room:   MRN: [id number]   Account #: [id number]    : 1980  (44 y.o.)  Gender: female   Referring Practitioner: Fidel Torre MD  Diagnosis: fatigue/fall             REASON FOR MISSED TREATMENT:  Patient declined   -    pt refuse 2* nausea and diarrhea. Bed activities offered but pt still refuse.         Electronically signed by ROBERTO Lester on 3/28/25 at 2:46 PM EDT  
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     
Pharmacy Medication History Note      List of current medications patient is taking is complete.    Source of information: OARRS, Care Everywhere, SureScript, Verified by patient     Changes made to medication list:  Medications removed (include reason, ex. therapy complete or physician discontinued, noncompliance):  None    Medications flagged for provider review:  Bupropion  mg -- Patient not taking, replaced by 300 mg XL  Cholestyramine 4 g -- Patient not taking   Fluoxetine 40 mg -- Patient not taking, but requested refill on 03/24/2025. Denied by family medicine physician and recommended a therapist or psychiatrist referral.   Insulin lispro -- Patient not taking   Fondaparinux 7.5 mg/0.6 mL -- Patient not taking, replaced by 10 mg/0.8mL   Insulin glargine vial -- Patient not taking, replaced by Toujeo   Pregabalin 50 mg -- Patient prescribed three times daily scheduled, but takes as needed    Medications added/doses adjusted:  Doxycycline hyclate 100 mg, take 1 tablet by mouth twice daily -- Added  Jardiance 10 mg, take 1 tablet by mouth daily -- Added  Silver Sulfadiazine 1% cream, apply topically twice daily for 14 days -- Added  Valsartan 80 mg, take 1 tablet by mouth daily -- Added   Tizanidine 4 mg, take 1 tablet by mouth three times daily as needed for muscle spasms -- Added  Fondaparinux 10mg/0.8mL, inject 0.8 mL into the skin daily -- Added  Toujeo 300 unit/mL, inect 15 units into the skin nightly -- Added  Pregabalin 50 mg, take 1 capsule by mouth three times daily -- Added  Percocet 5-325 mg, take 1 tablet by mouth every 8 hours as needed for pain for 14 days -- Added  Lorazepam 1 mg adjusted from 0.5 mg every 8 hours as needed for anxiety to 1 mg daily as needed for anxiety     Other notes (ex. Recent course of antibiotics, Coumadin dosing):  OARRS report shows oxycodone-acetaminophen 5-325 mg #42 for 14 days dispensed on 03/19/2025, oxycodone-acetaminophen 5-325 mg #6 for 3 days dispensed on 
Physical Therapy  [facility]   Physical Therapy Evaluation  Date: 3/27/25  Patient Name: Krista Chiang       Room: 8/2048-01  MRN: [id number]  Account: [id number]   : 1980  (44 y.o.) Gender: female     Discharge Recommendations:  Discharge Recommendations: Patient would benefit from continued therapy after discharge     PT D/C Equipment  Equipment Needed: No     Past Medical History:  has a past medical history of Alcohol abuse, Alcoholic hepatitis without ascites ([facility]), Antiphospholipid syndrome, Anxiety, Arthritis, Painting esophagus, Bipolar disorder, unspecified ([facility]), 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 encounter, Constipation, Depression, Fibromyalgia, Genital herpes, unspecified, GERD (gastroesophageal reflux disease), H/O bariatric surgery, History of pulmonary embolism, Hx of blood clots, Hypertension, Lives in nursing home, Lumbosacral spondylosis without myelopathy, MDRO (multiple drug resistant organisms) resistance, Mobility impaired, MRSA (methicillin resistant staph aureus) culture positive, Muscle weakness, Obsessive-compulsive disorder, unspecified, Opioid abuse, in remission ([facility]), Pernicious anemia, Polyneuropathy, unspecified, PTSD (post-traumatic stress disorder), Pulmonary embolism ([facility]), Suicidal behavior, SVT (supraventricular tachycardia), Type 2 diabetes mellitus, with long-term current use of insulin ([facility]), Under care of team, and Under care of team.  Past Surgical History:   has a past surgical history that includes Cholecystectomy; Liver Resection (); Tonsillectomy; Abdominal hernia repair (); Abdominal hernia repair (2014); Bariatric Surgery (2013); Dilation and curettage of uterus (); Finger amputation (Left, 2015); lymph node biopsy (); Total abdominal hysterectomy w/ bilateral salpingoophorectomy (); IVC filter insertion; hip surgery (Left); Ankle fracture surgery 
[facility]   Occupational Therapy Evaluation  Date: 3/27/25  Patient Name: Krista Chiang       Room: 2048/2048-01  MRN: [id number]  Account: [id number]   : 1980  (44 y.o.) Gender: female     Discharge Recommendations:  Further Occupational Therapy is recommended upon facility discharge.    OT Equipment Recommendations  Other: TBD    Referring Practitioner: Fidel Torre MD  Diagnosis: fatigue/fall        Treatment Diagnosis: impaired self care status.    Past Medical History:  has a past medical history of Alcohol abuse, Alcoholic hepatitis without ascites ([facility]), Antiphospholipid syndrome, Anxiety, Arthritis, Painting esophagus, Bipolar disorder, unspecified ([facility]), 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 encounter, Constipation, Depression, Fibromyalgia, Genital herpes, unspecified, GERD (gastroesophageal reflux disease), H/O bariatric surgery, History of pulmonary embolism, Hx of blood clots, Hypertension, Lives in nursing home, Lumbosacral spondylosis without myelopathy, MDRO (multiple drug resistant organisms) resistance, Mobility impaired, MRSA (methicillin resistant staph aureus) culture positive, Muscle weakness, Obsessive-compulsive disorder, unspecified, Opioid abuse, in remission ([facility]), Pernicious anemia, Polyneuropathy, unspecified, PTSD (post-traumatic stress disorder), Pulmonary embolism ([facility]), Suicidal behavior, SVT (supraventricular tachycardia), Type 2 diabetes mellitus, with long-term current use of insulin ([facility]), Under care of team, and Under care of team.    Past Surgical History:   has a past surgical history that includes Cholecystectomy; Liver Resection (); Tonsillectomy; Abdominal hernia repair (); Abdominal hernia repair (2014); Bariatric Surgery (2013); Dilation and curettage of uterus (); Finger amputation (Left, 2015); lymph node biopsy (); Total abdominal hysterectomy w/ 
Spiritual Health History and Assessment/Progress Note  [facility]    (P) Loneliness/Social Isolation,  ,  ,      Name: Krista Chiang MRN: [id number]    Age: 44 y.o.     Sex: female   Language: English   [employer]agogue: [employer]   Other fatigue     Date: 3/31/2025            Total Time Calculated: (P) 6 min           Upon entering room writer noticed patient was dressed with her belongings packed up and ready to leave to be discharged. Patient stated she is pleased to get to leave and didn't need anything from spiritual care.        Spiritual Assessment began in [facility]        Referral/Consult From: (P) Rounding   Encounter Overview/Reason: (P) Loneliness/Social Isolation  Service Provided For: (P) Patient    Amy, Belief, Meaning:   Patient identifies as spiritual  Family/Friends No family/friends present      Importance and Influence:  Patient has no beliefs influential to healthcare decision-making identified during this visit  Family/Friends No family/friends present    Community:  Patient feels well-supported. Support system includes: Extended family  Family/Friends No family/friends present    Assessment and Plan of Care:     Patient Interventions include: Facilitated expression of thoughts and feelings  Family/Friends Interventions include: No family/friends present    Patient Plan of Care: Spiritual Care available upon further referral  Family/Friends Plan of Care: No family/friends present    Electronically signed by Chaplain Lucius on 3/31/2025 at 4:55 PM    
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)     S/P gastric bypass     Herpes genitalis     History of drug abuse (HCC)     Malabsorption     History of pulmonary embolus (PE)     Vitamin B 12 deficiency     Vitamin D deficiency     History of surgery of liver     Blood alkaline phosphatase increased compared with prior measurement     SVT (supraventricular tachycardia)     Anxiety     Chronic idiopathic constipation     Recurrent incisional hernia     History of small bowel obstruction     History of peptic ulcer     Fibromyalgia     Polyneuropathy     COVID-19 virus infection     Acute pancreatitis     Multifocal pneumonia     Alcoholic hepatitis (HCC)     Acute alcoholic intoxication without complication     Acute alcohol intoxication with alcoholism, uncomplicated (HCC)     Alcohol withdrawal syndrome with complication (HCC)     Accident due to mechanical fall without injury     Thoracic back pain     Floaters in visual field, right     Hyperglycemia     Bipolar 1 disorder, mixed, severe (HCC)     Diabetes mellitus (HCC)     Hypocalcemia     Elevated LFTs     Diabetic acidosis without coma (HCC)     Arrhythmia     Mild malnutrition     Alcohol withdrawal syndrome without complication (HCC)     Opioid overdose (HCC)     Severe hypoxic-ischemic encephalopathy (HCC)     Cognitive impairment     SAH (subarachnoid hemorrhage) (HCC)     Frequent falls     Syncope and collapse     Metabolic acidosis, increased anion gap     Bipolar depression (HCC)     Closed fracture of left hip (HCC)     Abscess of left hip     Surgical wound infection     Staph aureus infection     Anemia     Hyponatremia     Surgical site infection     Post-op pain     Bipolar 1 disorder (HCC)     Postoperative wound infection of left hip     S/P total left hip arthroplasty     Prosthetic joint infection     MSSA (methicillin susceptible Staphylococcus aureus) infection     Prosthetic hip 
(CVA) (HCC)     Vitamin B12 deficiency anemia due to intrinsic factor deficiency     Presence of other vascular implants and grafts     Pure hyperglyceridemia     Personal history of urinary (tract) infections     Personal history of pneumonia (recurrent)     Personal history of PE (pulmonary embolism)     Personal history of other venous thrombosis and embolism     Personal history of Methicillin resistant Staphylococcus aureus infection     Other primary thrombophilia     Iron deficiency anemia     Type 2 diabetes mellitus with foot ulcer, with long-term current use of insulin (HCC)     Mild protein-calorie malnutrition     Pressure ulcer of right buttock, stage 3 (HCC)     Open thigh wound, left, subsequent encounter     Nonhealing surgical wound, subsequent encounter     Open wnd hip/thigh-complicated, left, subsequent encounter     Intractable pain     Failure to thrive in adult     Black stool     Abdominal pain     Nausea     Wound infection     Chronic wound     Rupture of operation wound     Skin ulcer of abdomen with fat layer exposed (HCC)     Skin ulcer of thigh, right, with fat layer exposed (HCC)     DKA, type 1, not at goal (HCC)     Diarrhea     Dizziness     Wide QRS ventricular tachycardia (HCC)     Shock, cardiogenic (HCC)     Anastomotic ulcer     Diabetic ketoacidosis without coma associated with type 1 diabetes mellitus (HCC)     Other fatigue      Plan of Treatment:   Medications reviewed  1: Postural hypotension keep patient well-hydrated  2: History of hypertension she is on Diovan 80 mg a day, also on metoprolol 50 mg twice daily  Check blood pressure manually only if supine blood pressure is less than 130 reduce the dose of Diovan to 40 mg a day  3: History of supraventricular arrhythmias continue current dose of flecainide 100 mg twice daily at present QTc is normal continue current dose of metoprolol 50 mg p.o.  4: History of DVT, antiphospholipid syndrome continue fondaparinux  5: 
intracranial abnormality.     XR FEMUR LEFT (MIN 2 VIEWS)  Result Date: 3/26/2025  EXAMINATION: 2 XRAY VIEWS OF THE LEFT FEMUR 3/26/2025 12:36 pm COMPARISON: None. HISTORY: ORDERING SYSTEM PROVIDED HISTORY: Fall, pain TECHNOLOGIST PROVIDED HISTORY: Fall, pain Reason for Exam: Pt states fall in hallway at home today. Pt states had replacement, then had an infection and replacement was removed. FINDINGS: The patient is status post removal of left hip total arthroplasty, with no residual head or neck noted.  Cerclage wires are noted along the proximal femur.  There is lateral and superior migration of the femur.  There are osteoarthritic changes of knee, manifested by osteophyte formation involving old 3 compartments.  There is some narrowing of the medial compartment with calcification of the medial meniscus.  No acute fractures noted.     1. No acute fracture or dislocation. 2. Status post removal of left hip total arthroplasty. 3. Osteoarthritic changes of the left knee.     XR CHEST PORTABLE  Result Date: 3/26/2025  EXAMINATION: ONE XRAY VIEW OF THE CHEST 3/26/2025 12:36 pm COMPARISON: None. HISTORY: ORDERING SYSTEM PROVIDED HISTORY: Syncope TECHNOLOGIST PROVIDED HISTORY: Syncope Reason for Exam: Pt states fall in hallway at home today. Pt states had replacement, then had an infection and replacement was removed. FINDINGS: The lungs are without acute focal process.  There is no effusion or pneumothorax. The cardiomediastinal silhouette is without acute process. The osseous structures are without acute process.     No acute process.     CT Head W/O Contrast  Result Date: 3/23/2025  EXAMINATION: CT OF THE HEAD WITHOUT CONTRAST; CT OF THE CERVICAL SPINE WITHOUT CONTRAST 3/22/2025 11:30 pm TECHNIQUE: CT of the head was performed without the administration of intravenous contrast. Automated exposure control, iterative reconstruction, and/or weight based adjustment of the mA/kV was utilized to reduce the radiation dose 
without the administration of intravenous contrast. Automated exposure control, iterative reconstruction, and/or weight based adjustment of the mA/kV was utilized to reduce the radiation dose to as low as reasonably achievable.; CT of the cervical spine was performed without the administration of intravenous contrast. Multiplanar reformatted images are provided for review. Automated exposure control, iterative reconstruction, and/or weight based adjustment of the mA/kV was utilized to reduce the radiation dose to as low as reasonably achievable. COMPARISON: None. HISTORY: ORDERING SYSTEM PROVIDED HISTORY: fall TECHNOLOGIST PROVIDED HISTORY: fall Decision Support Exception - unselect if not a suspected or confirmed emergency medical condition->Emergency Medical Condition (MA) Is the patient pregnant?->No Reason for Exam: fall Additional signs and symptoms: On blood thinners; ORDERING SYSTEM PROVIDED HISTORY: fall TECHNOLOGIST PROVIDED HISTORY: fall Decision Support Exception - unselect if not a suspected or confirmed emergency medical condition->Emergency Medical Condition (MA) Is the patient pregnant?->No Reason for Exam: fall Additional signs and symptoms: On blood thinners FINDINGS: HEAD: BRAIN/VENTRICLES: There is no acute intracranial hemorrhage, mass effect or midline shift. No abnormal extra-axial fluid collection.  The gray-white differentiation is maintained.  There is no evidence of hydrocephalus. ORBITS: The visualized portion of the orbits demonstrate no acute abnormality. SINUSES: The visualized paranasal sinuses and mastoid air cells demonstrate no acute abnormality. SOFT TISSUES/SKULL:  No acute abnormality of the visualized skull or soft tissues. CERVICAL SPINE: BONES/ALIGNMENT: There is no evidence of an acute cervical spine fracture. No traumatic malalignment.  Congenital incomplete ossification of the posterior ring of C1. DEGENERATIVE CHANGES: No significant degenerative changes. SOFT TISSUES: 
head and neck with superior subluxation and postoperative findings again demonstrated.  Ulceration in the posterior left hip again demonstrated without identifiable fluid collection or new osseous changes appreciated.  Chronic mild anterior wedging of L1 again demonstrated.     1. No acute inflammatory process identified in the abdomen or pelvis, within the limits of a noncontrast exam. 2. Chronic findings in the left hip and overlying soft tissue ulceration again demonstrated. 3. Additional stable chronic and benign findings, as described.     XR CHEST PORTABLE  Result Date: 3/22/2025  EXAMINATION: ONE XRAY VIEW OF THE CHEST 3/22/2025 9:32 pm COMPARISON: 03/09/2025 HISTORY: Acute shortness of breath, fatigue, and dizziness for 2 days. FINDINGS: Cardiomediastinal silhouette and pulmonary vasculature are normal.  No consolidation, pleural effusion, or pneumothorax.     Negative chest radiograph.     CT HEAD WO CONTRAST  Result Date: 3/19/2025  EXAMINATION: CT OF THE HEAD WITHOUT CONTRAST; CT OF THE CERVICAL SPINE WITHOUT CONTRAST 3/17/2025 11:35 pm TECHNIQUE: CT of the head was performed without the administration of intravenous contrast. Automated exposure control, iterative reconstruction, and/or weight based adjustment of the mA/kV was utilized to reduce the radiation dose to as low as reasonably achievable.; CT of the cervical spine was performed without the administration of intravenous contrast. Multiplanar reformatted images are provided for review. Automated exposure control, iterative reconstruction, and/or weight based adjustment of the mA/kV was utilized to reduce the radiation dose to as low as reasonably achievable. COMPARISON: 03/10/2025. HISTORY: ORDERING SYSTEM PROVIDED HISTORY: FFSH on blood thinners TECHNOLOGIST PROVIDED HISTORY: FFSH on blood thinners Decision Support Exception - unselect if not a suspected or confirmed emergency medical condition->Emergency Medical Condition (MA) Is the patient 
per day.   - Patient fell after standing up early in morning. Experienced dizziness before fall  - Patient has two recent admission for DKA in past two weeks  - Pan imaging negative for acute process  - UA negative for UTI  - Morphine for breakthrough pain every 6 hours  - Orthostats positive  - Normal saline at 75 mL/h  - Cardiology consulted, educated patient to refer she gets out of bed after long rest  - Wounds care consulted, long linear split within the proximal portion of the scar on patient's left hip, wound is red and clean    Intractable Nausea and Vomiting  -Endoscopy on 12/18/2024 positive for 2 large ulcerated anastomosis, on 1/6/2025, 3 ulcers were seen,-on 3/14, lower esophagus showed yellow exudate, to rule Candida and 2 cm ulcer  -Continuing home dose Carafate and lactobacillus     T2DM  -A1c 9.8, on 3/17/25  -Patient on 15 units Lantus nightly   -Patient reports decreased oral intake  -Medium dose sliding scale   -POCT glucose checks  -Hypoglycemia protocol      Malnutrition secondary to Post gastric bypass  - Follow Prealbumin   - Dietician consulted     Antiphospholipid syndrome  -Continuing home dose fondaparinux     Anxiety and depression  -Patient reported not taking Prozac and Wellbutrin  -On home dose Ativan as needed     History of SVT  - History of supraventricular tachycardias, on metoprolol and Flecainide     Peripheral neuropathy  - Continue Pregabalin      Bilateral lower extremity  - Bilateral lower extremity swelling on admission  - Switch Bumex 2 mg daily     DVT prophylaxis: Fondaparinux     GI prophylaxis: Carafate     Diet: 4 carb choice diet     Code: Full code     Discharge planning: TBD.  Patient lives alone and ambulates with walker      Plan will be discussed with the attending.    Xochitl Torre  PGY I Transitional Year Resident  3/28/2025 8:21 AM     Attending Physician Statement  I have discussed the care of Krista Chiang and I have examined the patient myself and

## 2025-03-31 NOTE — CARE COORDINATION
ONGOING DISCHARGE PLAN:    Patient is alert and oriented x4.    Spoke with patient regarding discharge plan and patient confirms that plan is still home with [facility]. Refusing SNF.     Cardio consult  +orthos  Bilateral knee high BATOOL hose     GI consult  C/O abd pain  [facility] started new this admit, follow for discharge. (Expensive and needs prior auth)   On carafate    Venous scan LUE    PT/OT    IP wound care    PCP office to call patient for follow up appt    Will continue to follow for additional discharge needs.    If patient is discharged prior to next notation, then this note serves as note for discharge by case management.    Electronically signed by Lynne Lui RN on 3/31/2025 at 10:07 AM

## 2025-03-31 NOTE — PLAN OF CARE
Problem: Chronic Conditions and Co-morbidities  Goal: Patient's chronic conditions and co-morbidity symptoms are monitored and maintained or improved  Outcome: Progressing  Flowsheets (Taken 3/31/2025 0535)  Care Plan - Patient's Chronic Conditions and Co-Morbidity Symptoms are Monitored and Maintained or Improved:   Monitor and assess patient's chronic conditions and comorbid symptoms for stability, deterioration, or improvement   Collaborate with multidisciplinary team to address chronic and comorbid conditions and prevent exacerbation or deterioration   Update acute care plan with appropriate goals if chronic or comorbid symptoms are exacerbated and prevent overall improvement and discharge  Note: Made sure pt. Understood what their conditions was and why it was occurring. Also educated pt. On ways to prevent the condition from worsening and from occurring again.      Problem: Discharge Planning  Goal: Discharge to home or other facility with appropriate resources  Outcome: Progressing  Flowsheets (Taken 3/31/2025 0535)  Discharge to home or other facility with appropriate resources:   Identify barriers to discharge with patient and caregiver   Refer to discharge planning if patient needs post-hospital services based on physician order or complex needs related to functional status, cognitive ability or social support system   Identify discharge learning needs (meds, wound care, etc)   Arrange for needed discharge resources and transportation as appropriate  Note: Inform pt. Of discharge teaching and planned. Instructed pt. To inform me if further teaching need to be done.      Problem: Pain  Goal: Verbalizes/displays adequate comfort level or baseline comfort level  3/31/2025 0535 by Kriss Pierre, RN  Outcome: Progressing  Flowsheets (Taken 3/31/2025 0535)  Verbalizes/displays adequate comfort level or baseline comfort level:   Encourage patient to monitor pain and request assistance   Assess pain using

## 2025-03-31 NOTE — CARE COORDINATION
Writer messaged Katty sapp/ [facility]. She stated [facility] went through but they are transferring Rx to patient's local pharmacy. Percocet was no co-pay.    Electronically signed by Lynne Lui RN on 3/31/2025 at 4:49 PM

## 2025-03-31 NOTE — PLAN OF CARE
Problem: Chronic Conditions and Co-morbidities  Goal: Patient's chronic conditions and co-morbidity symptoms are monitored and maintained or improved  3/31/2025 1739 by Kehinde Swift RN  Outcome: Adequate for Discharge  3/31/2025 0535 by Kriss Pierre RN  Outcome: Progressing  Flowsheets (Taken 3/31/2025 0535)  Care Plan - Patient's Chronic Conditions and Co-Morbidity Symptoms are Monitored and Maintained or Improved:   Monitor and assess patient's chronic conditions and comorbid symptoms for stability, deterioration, or improvement   Collaborate with multidisciplinary team to address chronic and comorbid conditions and prevent exacerbation or deterioration   Update acute care plan with appropriate goals if chronic or comorbid symptoms are exacerbated and prevent overall improvement and discharge  Note: Made sure pt. Understood what their conditions was and why it was occurring. Also educated pt. On ways to prevent the condition from worsening and from occurring again.      Problem: Discharge Planning  Goal: Discharge to home or other facility with appropriate resources  3/31/2025 1739 by Kehinde Swift RN  Outcome: Adequate for Discharge  3/31/2025 0535 by Kriss Pierre RN  Outcome: Progressing  Flowsheets (Taken 3/31/2025 0535)  Discharge to home or other facility with appropriate resources:   Identify barriers to discharge with patient and caregiver   Refer to discharge planning if patient needs post-hospital services based on physician order or complex needs related to functional status, cognitive ability or social support system   Identify discharge learning needs (meds, wound care, etc)   Arrange for needed discharge resources and transportation as appropriate  Note: Inform pt. Of discharge teaching and planned. Instructed pt. To inform me if further teaching need to be done.      Problem: Pain  Goal: Verbalizes/displays adequate comfort level or baseline comfort level  3/31/2025 1739 by Kehinde Swift

## 2025-03-31 NOTE — CARE COORDINATION
[facility] Encounter Date/Time: 3/26/2025 1055    Hospital Account: [id number]    MRN: [id number]    Patient: Krista Thurman    Contact Serial #: [id number]      ENCOUNTER          Patient Class: I Private Enc?  No Unit RM BD: [facility]    Hospital Service: INM   Encounter DX: Syncope and collapse [R5*   ADM Provider: Ezio Conway MD   Procedure:     ATT Provider: Carli Mendoza MD   REF Provider:        Admission DX: Syncope and collapse, Dehydration, Nausea, Other fatigue, Pain of left hip and DX codes: R55, E86.0, R11.0, R53.83, M25.552      PATIENT             Name: Krista FONTENOT \"Eliana\" Mariia : 1980 (44 yrs)   Address: [address] [place] [address] [address] * Sex: Female   City: [place] [address]         Marital Status:    Employer: DISABLED         [facility]: [facility]   Primary Care Provider: Arielle Melton, APR*         Primary Phone: [phone]   EMERGENCY CONTACT   Name Home Phone Work Phone Mobile Phone Relationship Lgl Grd   GLORIA COSME [phone] Parent     RONDA COSME [phone] Brother/Sis*           GUARANTOR            Guarantor: Krista Thurman     : 1980   Address: [address] [place] [address] [address] * Sex: [place] [address]     Relation to Patient: Self       Home Phone: [phone]   Guarantor ID: [id number]       Work Phone:     Guarantor Employer: DISABLED         Status: DISABLED      COVERAGE        PRIMARY INSURANCE   Payor: [employer] BETTER [employer]* Plan: [employer] BETTER [employer] [place]*   Payor Address: [facility] [id number],  [place] [address]       Group Number:   Insurance Type: INDEMNITY   Subscriber Name: KRISTA THURMAN Subscriber : 1980   Subscriber ID: [id number] Pat. Rel. to Sub: Self   SECONDARY INSURANCE   Payor:   Plan:     Payor Address:  ,           Group Number:   Insurance Type:     Subscriber Name:   Subscriber :     Subscriber ID:   Pat. Rel. to Sub:

## 2025-04-01 ENCOUNTER — CARE COORDINATION (OUTPATIENT)
Dept: CARE COORDINATION | Age: 45
End: 2025-04-01

## 2025-04-01 LAB — ECHO BSA: 2.11 M2

## 2025-04-01 NOTE — DISCHARGE SUMMARY
known as: BENTYL     doxycycline hyclate 100 MG tablet  Commonly known as: VIBRA-TABS     flecainide 100 MG tablet  Commonly known as: TAMBOCOR  Take 1 tablet by mouth 2 times daily     * fondaparinux 10 MG/0.8ML Soln injection  Commonly known as: ARIXTRA     glucose monitoring kit  Testing twice a day.  Please dispense according to patients insurance.     * insulin glargine (1 unit dial) 300 UNIT/ML concentrated injection pen  Commonly known as: TOUJEO     INSULIN LISPRO IJ     Insulin Pen Needle 31G X 5 MM Misc  1 each by Does not apply route daily     Jardiance 10 MG tablet  Generic drug: empagliflozin     Lancets 30G Misc  Testing twice a day.  Please dispense according to patients insurance.     LORazepam 1 MG tablet  Commonly known as: ATIVAN     melatonin 3 MG Tabs tablet     metoprolol tartrate 50 MG tablet  Commonly known as: LOPRESSOR  Take 1 tablet by mouth 2 times daily     miconazole 2 % powder  Commonly known as: MICOTIN  Apply topically 2 times daily.     midodrine 2.5 MG tablet  Commonly known as: PROAMATINE     ondansetron 8 MG Tbdp disintegrating tablet  Commonly known as: ZOFRAN-ODT     pantoprazole 40 MG tablet  Commonly known as: PROTONIX  Take 1 tablet by mouth 2 times daily (before meals)     pregabalin 50 MG capsule  Commonly known as: LYRICA     scopolamine transdermal patch  Commonly known as: TRANSDERM-SCOP  Place 1 patch onto the skin every 72 hours Fell off yesterday     silver sulfADIAZINE 1 % cream  Commonly known as: SILVADENE     sucralfate 1 GM/10ML suspension  Commonly known as: Carafate  Take 10 mLs by mouth 4 times daily     SYRINGE 3CC/25GX1\" 25G X 1\" 3 ML Misc  To be used with B12 injections monthly     therapeutic multivitamin-minerals tablet  Take 1 tablet by mouth daily     tiZANidine 4 MG tablet  Commonly known as: ZANAFLEX     valsartan 80 MG tablet  Commonly known as: DIOVAN     vitamin D 50 MCG (2000 UT) Caps capsule  Commonly known as: CHOLECALCIFEROL     zinc sulfate

## 2025-04-01 NOTE — CARE COORDINATION
Care Transitions Note    Initial Call - Call within 2 business days of discharge: Yes    Patient Current Location:  [place]    Care Transition Nurse contacted the  Care Navigater   Rita Cruz RN by telephone to perform post hospital discharge assessment, verified name and  as identifiers.  Provided introduction to self, and explanation of the Care Transition Nurse role.    Patient: Krista Chiang    Patient : 1980   MRN: [id number]    Reason for Admission:   Discharge Date: 3/31/25  RURS: Readmission Risk Score: 38.1      Last Discharge Facility       Date Complaint Diagnosis Description Type Department Provider    3/26/25 Fall; Hip Pain; Headache; Dizziness Syncope and collapse ... ED to Hosp-Admission (Discharged) (ADMITTED) [facility] Carli Szymanski MD; [place] ...            Was this an external facility discharge? No    Additional needs identified to be addressed with provider   No needs identified             Method of communication with provider: phone.    Patients top risk factors for readmission:       Interventions to address risk factors:        Care Summary Note: Writer contacted Rita Cruz RN  from [facility] . Spoke to Mago in the office, she confirmed that patient is enrolled with Rita in the Care Navigation program,. She also informed writer that patient has a HFU with then today and that Rita will be seeing patient, no further follow up, CTN episode resolved//JU        .        Follow Up Appointment:     Future Appointments         Provider Specialty Dept Phone    2025 1:30 PM Sherman Rucker MD Cardiac Electrophysiology [phone]            Patient  from the Care Transitions program on 25.    Handoff:   Patient was not referred to the ACM team due to   .      Patient has agreed to contact primary care provider and/or specialist for any further questions, concerns, or needs.    Kinga Gardiner RN

## 2025-04-02 LAB
MICROORGANISM SPEC CULT: ABNORMAL
MICROORGANISM SPEC CULT: ABNORMAL
SERVICE CMNT-IMP: ABNORMAL
SPECIMEN DESCRIPTION: ABNORMAL

## 2025-04-11 NOTE — PROGRESS NOTES
[phone]    Lumbosacral spondylosis without myelopathy     MDRO (multiple drug resistant organisms) resistance     MRSA (abd)    Mobility impaired     wheelchair, uses a walker and pivots on right foot    MRSA (methicillin resistant staph aureus) culture positive 02/10/2017    urine    Muscle weakness     Obsessive-compulsive disorder, unspecified     Opioid abuse, in remission     Pernicious anemia     Polyneuropathy, unspecified     PTSD (post-traumatic stress disorder)     Pulmonary embolism 2010    Suicidal behavior 2015    many attempts    SVT (supraventricular tachycardia) 2017    Type 2 diabetes mellitus, with long-term current use of insulin (HCC)     Under care of team     ID, Dr. Jorge, last seen 10/26/2023 while in hospital    Under care of team     Megan, Dr. Traore, last seen by resident in ER 10/30/2023       Past Surgical History:   Procedure Laterality Date    ABDOMINAL HERNIA REPAIR      incisional hernia repair, complicated by infection, had multiple surgeries for this    ABDOMINAL HERNIA REPAIR  2014    ANKLE FRACTURE SURGERY Left     2023  HIP IRRIGATION AND DEBRIDEMENT AND PLACEMENT OF ANTIBIOTIC IMPREGNATED CEMENT BEADS performed by Dipak Traore DO at [facility] OR    ANKLE FRACTURE SURGERY Left 10/24/2023    IRRIGATION AND DEBRIDEMENT OF LEFT HIP WITH CLOSURE performed by Dpiak Traore DO at [facility] OR    ANKLE SURGERY Left 2019    ligament    ANKLE SURGERY Left 2023    IRRIGATION AND DEBRIDEMENT HIP (IRRISEPT, CELLERATE) performed by Dipak Traore DO at [facility] OR    BARIATRIC SURGERY  2013    [facility] : [facility]     SECTION      CHOLECYSTECTOMY      DILATION AND CURETTAGE OF UTERUS      ESOPHAGOGASTRODUODENOSCOPY  2021    ESOPHAGOGASTRODUODENOSCOPY  2013    FINGER AMPUTATION Left 2015    index and ring finger. from frobite     CATH POWER PICC SINGLE  2023    HIP SURGERY Left

## 2025-04-14 ENCOUNTER — OFFICE VISIT (OUTPATIENT)
Dept: GASTROENTEROLOGY | Age: 45
End: 2025-04-14
Payer: COMMERCIAL

## 2025-04-14 ENCOUNTER — PREP FOR PROCEDURE (OUTPATIENT)
Dept: GASTROENTEROLOGY | Age: 45
End: 2025-04-14

## 2025-04-14 VITALS
DIASTOLIC BLOOD PRESSURE: 85 MMHG | BODY MASS INDEX: 30.62 KG/M2 | WEIGHT: 202 LBS | RESPIRATION RATE: 18 BRPM | OXYGEN SATURATION: 99 % | HEART RATE: 89 BPM | HEIGHT: 68 IN | SYSTOLIC BLOOD PRESSURE: 153 MMHG

## 2025-04-14 DIAGNOSIS — K21.9 GASTROESOPHAGEAL REFLUX DISEASE WITHOUT ESOPHAGITIS: ICD-10-CM

## 2025-04-14 DIAGNOSIS — K90.89 OTHER SPECIFIED INTESTINAL MALABSORPTION: ICD-10-CM

## 2025-04-14 DIAGNOSIS — Z90.3 S/P TOTAL GASTRECTOMY AND ROUX-EN-Y ESOPHAGOJEJUNAL ANASTOMOSIS: ICD-10-CM

## 2025-04-14 DIAGNOSIS — Z12.11 SCREENING FOR MALIGNANT NEOPLASM OF COLON: ICD-10-CM

## 2025-04-14 DIAGNOSIS — Z80.0 FHX: COLON CANCER: ICD-10-CM

## 2025-04-14 DIAGNOSIS — Z98.0 S/P TOTAL GASTRECTOMY AND ROUX-EN-Y ESOPHAGOJEJUNAL ANASTOMOSIS: ICD-10-CM

## 2025-04-14 DIAGNOSIS — K86.81 EXOCRINE PANCREATIC INSUFFICIENCY: ICD-10-CM

## 2025-04-14 DIAGNOSIS — K28.9 ANASTOMOTIC ULCER: Primary | ICD-10-CM

## 2025-04-14 PROCEDURE — 3079F DIAST BP 80-89 MM HG: CPT | Performed by: PHYSICIAN ASSISTANT

## 2025-04-14 PROCEDURE — 99214 OFFICE O/P EST MOD 30 MIN: CPT | Performed by: PHYSICIAN ASSISTANT

## 2025-04-14 PROCEDURE — 3077F SYST BP >= 140 MM HG: CPT | Performed by: PHYSICIAN ASSISTANT

## 2025-04-14 RX ORDER — SUCRALFATE 1 G/1
1 TABLET ORAL 4 TIMES DAILY
Qty: 120 TABLET | Refills: 3 | Status: SHIPPED | OUTPATIENT
Start: 2025-04-14

## 2025-04-14 RX ORDER — DICYCLOMINE HYDROCHLORIDE 10 MG/1
10 CAPSULE ORAL
Qty: 90 CAPSULE | Refills: 5 | Status: SHIPPED | OUTPATIENT
Start: 2025-04-14

## 2025-04-14 RX ORDER — PROMETHAZINE HYDROCHLORIDE 12.5 MG/1
12.5 TABLET ORAL 4 TIMES DAILY PRN
Qty: 20 TABLET | Refills: 0 | Status: SHIPPED | OUTPATIENT
Start: 2025-04-14 | End: 2025-04-21

## 2025-04-21 ENCOUNTER — OFFICE VISIT (OUTPATIENT)
Age: 45
End: 2025-04-21
Payer: COMMERCIAL

## 2025-04-21 VITALS
RESPIRATION RATE: 16 BRPM | HEIGHT: 68 IN | WEIGHT: 202 LBS | BODY MASS INDEX: 30.62 KG/M2 | HEART RATE: 83 BPM | SYSTOLIC BLOOD PRESSURE: 113 MMHG | DIASTOLIC BLOOD PRESSURE: 94 MMHG | OXYGEN SATURATION: 99 %

## 2025-04-21 DIAGNOSIS — I47.20 WIDE QRS VENTRICULAR TACHYCARDIA (HCC): Primary | ICD-10-CM

## 2025-04-21 DIAGNOSIS — I47.10 SVT (SUPRAVENTRICULAR TACHYCARDIA): ICD-10-CM

## 2025-04-21 DIAGNOSIS — F10.21 ALCOHOL DEPENDENCE IN REMISSION (HCC): ICD-10-CM

## 2025-04-21 PROCEDURE — 3074F SYST BP LT 130 MM HG: CPT | Performed by: SPECIALIST

## 2025-04-21 PROCEDURE — 3080F DIAST BP >= 90 MM HG: CPT | Performed by: SPECIALIST

## 2025-04-21 PROCEDURE — 99215 OFFICE O/P EST HI 40 MIN: CPT | Performed by: SPECIALIST

## 2025-04-21 NOTE — PROGRESS NOTES
[facility] CARDIAC ELECTROPHYSIOLOGY  [address], [address]  [place] [facility] [place]  [address]    Date of Visit:  2025  Patient Name: Krista Chiang   Patient :  1980   Referring By:  Carli Mendoza MD     CHIEF COMPLAINT/HPI:     Krista Chiang is a 44 y.o. female who presents today for an general visit to be evaluated for the following condition(s):  Chief Complaint   Patient presents with    Follow-up     Follow up from hospital tachycardia    Repeated bouts of palpitation.  With documentation in the hospital of wide QRS tachycardia on 2025 at 330 1 in the morning [place].  Also at 1 point in 2023 she had narrow QRS tachycardia suggestive of SVT.  May be a short RP type tachycardia.  The rates are totally different.  The wide QRS is 229 bpm of left bundle inferior axis probably.  I am not sure if it is ventricular tachycardia or supraventricular tachycardia but the rate is totally different.  In my note in the past patient went from narrow to wide and from wide to narrow suggestive to me probably that there is 1 type of tachycardia although I could not rule out 2 types of tachycardias.    Patient is on flecainide 100 mg p.o. twice daily and Lopressor 50 mg p.o. daily.  Symptoms been going on for years.  She gets 1-2 events every month she said.    Options of EP and ablation was discussed with the patient in the presence of her mother.  They want to think about it.  They understand that medication is an option she already failed Tambocor.    EF is acceptable.  No other structural heart disease.    Will provide with reading material patient and family will make the decision    REVIEW OF SYSTEM      Review of Systems noncontributory at this point    REVIEWED INFORMATION      Allergies   Allergen Reactions    Aripiprazole Itching and Swelling     Other reaction(s): Other  Abilify    Aspirin      Patient is on chronic anticoagulation  Patient is on chronic

## 2025-04-21 NOTE — TELEPHONE ENCOUNTER
Procedure scheduled/Dr Marques  Procedure: colon egd  Dx: anastomotic ulcer, gastroesophageal reflux disease without esophagitis  Date: 8/27/25  Time: 8 a.m.  Hospital: [facility] phone call: TBD   Bowel Prep instructions given: Timothy  In office/via phone: office   Clearance needed: yes  Cardiac clearance Dr Arceo -   GLP-1: N/A

## 2025-04-22 RX ORDER — POLYETHYLENE GLYCOL 3350, SODIUM SULFATE ANHYDROUS, SODIUM BICARBONATE, SODIUM CHLORIDE, POTASSIUM CHLORIDE 236; 22.74; 6.74; 5.86; 2.97 G/4L; G/4L; G/4L; G/4L; G/4L
4 POWDER, FOR SOLUTION ORAL ONCE
Qty: 4000 ML | Refills: 0 | Status: SHIPPED | OUTPATIENT
Start: 2025-04-22 | End: 2025-04-22

## 2025-04-23 DIAGNOSIS — Z01.812 ENCOUNTER FOR PRE-OPERATIVE LABORATORY TESTING: ICD-10-CM

## 2025-04-23 DIAGNOSIS — I47.10 SVT (SUPRAVENTRICULAR TACHYCARDIA): Primary | ICD-10-CM

## 2025-04-29 RX ORDER — PANTOPRAZOLE SODIUM 40 MG/1
40 TABLET, DELAYED RELEASE ORAL
Qty: 60 TABLET | Refills: 0 | OUTPATIENT
Start: 2025-04-29

## 2025-04-30 RX ORDER — FLECAINIDE ACETATE 100 MG/1
100 TABLET ORAL 2 TIMES DAILY
Qty: 60 TABLET | Refills: 0 | OUTPATIENT
Start: 2025-04-30

## 2025-05-05 ENCOUNTER — HOSPITAL ENCOUNTER (OUTPATIENT)
Age: 45
Discharge: HOME | End: 2025-05-05
Payer: COMMERCIAL

## 2025-05-05 LAB
ANION GAP SERPL CALCULATED.3IONS-SCNC: 14 MMOL/L (ref 9–16)
BASOPHILS # BLD: 0.1 K/UL (ref 0–0.2)
BASOPHILS NFR BLD: 1 % (ref 0–2)
BUN SERPL-MCNC: 9 MG/DL (ref 6–20)
CALCIUM SERPL-MCNC: 8.8 MG/DL (ref 8.6–10.4)
CHLORIDE SERPL-SCNC: 107 MMOL/L (ref 98–107)
CO2 SERPL-SCNC: 18 MMOL/L (ref 20–31)
CREAT SERPL-MCNC: 0.5 MG/DL (ref 0.7–1.2)
EOSINOPHIL # BLD: 0.1 K/UL (ref 0–0.4)
EOSINOPHILS RELATIVE PERCENT: 2 % (ref 0–4)
ERYTHROCYTE [DISTWIDTH] IN BLOOD BY AUTOMATED COUNT: 16.7 % (ref 11.5–14.9)
GFR, ESTIMATED: >90 ML/MIN/1.73M2
GLUCOSE SERPL-MCNC: 309 MG/DL (ref 74–99)
HCT VFR BLD AUTO: 36.5 % (ref 36–46)
HGB BLD-MCNC: 11.8 G/DL (ref 12–16)
LYMPHOCYTES NFR BLD: 1.9 K/UL (ref 1–4.8)
LYMPHOCYTES RELATIVE PERCENT: 28 % (ref 24–44)
MCH RBC QN AUTO: 29 PG (ref 26–34)
MCHC RBC AUTO-ENTMCNC: 32.2 G/DL (ref 31–37)
MCV RBC AUTO: 89.9 FL (ref 80–100)
MONOCYTES NFR BLD: 0.4 K/UL (ref 0.1–1.3)
MONOCYTES NFR BLD: 6 % (ref 1–7)
NEUTROPHILS NFR BLD: 63 % (ref 36–66)
NEUTS SEG NFR BLD: 4.3 K/UL (ref 1.3–9.1)
PLATELET # BLD AUTO: 475 K/UL (ref 150–450)
PMV BLD AUTO: 9 FL (ref 6–12)
POTASSIUM SERPL-SCNC: 4.2 MMOL/L (ref 3.7–5.3)
RBC # BLD AUTO: 4.06 M/UL (ref 4–5.2)
SODIUM SERPL-SCNC: 139 MMOL/L (ref 136–145)
WBC OTHER # BLD: 6.8 K/UL (ref 3.5–11)

## 2025-05-05 PROCEDURE — 85025 COMPLETE CBC W/AUTO DIFF WBC: CPT

## 2025-05-05 PROCEDURE — 36415 COLL VENOUS BLD VENIPUNCTURE: CPT

## 2025-05-05 PROCEDURE — 80048 BASIC METABOLIC PNL TOTAL CA: CPT

## 2025-05-12 ENCOUNTER — HOSPITAL ENCOUNTER (OUTPATIENT)
Age: 45
Setting detail: OUTPATIENT SURGERY
Discharge: HOME | End: 2025-05-12
Attending: SPECIALIST | Admitting: SPECIALIST
Payer: COMMERCIAL

## 2025-05-12 VITALS
OXYGEN SATURATION: 100 % | TEMPERATURE: 98.1 F | SYSTOLIC BLOOD PRESSURE: 161 MMHG | DIASTOLIC BLOOD PRESSURE: 92 MMHG | RESPIRATION RATE: 15 BRPM | WEIGHT: 196 LBS | BODY MASS INDEX: 29.7 KG/M2 | HEIGHT: 68 IN | HEART RATE: 66 BPM

## 2025-05-12 DIAGNOSIS — I47.10 SVT (SUPRAVENTRICULAR TACHYCARDIA): ICD-10-CM

## 2025-05-12 LAB
ECHO BSA: 2.07 M2
GLUCOSE BLD-MCNC: 129 MG/DL (ref 65–105)
GLUCOSE BLD-MCNC: 50 MG/DL (ref 65–105)
GLUCOSE BLD-MCNC: 54 MG/DL (ref 65–105)

## 2025-05-12 PROCEDURE — 93623 PRGRMD STIMJ&PACG IV RX NFS: CPT | Performed by: SPECIALIST

## 2025-05-12 PROCEDURE — C2630 CATH, EP, COOL-TIP: HCPCS | Performed by: SPECIALIST

## 2025-05-12 PROCEDURE — 2500000003 HC RX 250 WO HCPCS: Performed by: SPECIALIST

## 2025-05-12 PROCEDURE — 99152 MOD SED SAME PHYS/QHP 5/>YRS: CPT | Performed by: SPECIALIST

## 2025-05-12 PROCEDURE — 76937 US GUIDE VASCULAR ACCESS: CPT | Performed by: SPECIALIST

## 2025-05-12 PROCEDURE — 82947 ASSAY GLUCOSE BLOOD QUANT: CPT

## 2025-05-12 PROCEDURE — 7100000010 HC PHASE II RECOVERY - FIRST 15 MIN: Performed by: SPECIALIST

## 2025-05-12 PROCEDURE — 99221 1ST HOSP IP/OBS SF/LOW 40: CPT | Performed by: SPECIALIST

## 2025-05-12 PROCEDURE — 7100000011 HC PHASE II RECOVERY - ADDTL 15 MIN: Performed by: SPECIALIST

## 2025-05-12 PROCEDURE — 99153 MOD SED SAME PHYS/QHP EA: CPT | Performed by: SPECIALIST

## 2025-05-12 PROCEDURE — C1894 INTRO/SHEATH, NON-LASER: HCPCS | Performed by: SPECIALIST

## 2025-05-12 PROCEDURE — 2709999900 HC NON-CHARGEABLE SUPPLY: Performed by: SPECIALIST

## 2025-05-12 PROCEDURE — 99238 HOSP IP/OBS DSCHRG MGMT 30/<: CPT | Performed by: SPECIALIST

## 2025-05-12 PROCEDURE — C1730 CATH, EP, 19 OR FEW ELECT: HCPCS | Performed by: SPECIALIST

## 2025-05-12 PROCEDURE — C1731 CATH, EP, 20 OR MORE ELEC: HCPCS | Performed by: SPECIALIST

## 2025-05-12 PROCEDURE — 93653 COMPRE EP EVAL TX SVT: CPT | Performed by: SPECIALIST

## 2025-05-12 PROCEDURE — 6360000002 HC RX W HCPCS: Performed by: SPECIALIST

## 2025-05-12 RX ORDER — ISOPROTERENOL HYDROCHLORIDE 0.2 MG/ML
INJECTION, SOLUTION INTRAVENOUS PRN
Status: DISCONTINUED | OUTPATIENT
Start: 2025-05-12 | End: 2025-05-12 | Stop reason: HOSPADM

## 2025-05-12 RX ORDER — SODIUM CHLORIDE 9 MG/ML
INJECTION, SOLUTION INTRAVENOUS CONTINUOUS
Status: DISCONTINUED | OUTPATIENT
Start: 2025-05-12 | End: 2025-05-12 | Stop reason: HOSPADM

## 2025-05-12 RX ORDER — FENTANYL CITRATE 50 UG/ML
INJECTION, SOLUTION INTRAMUSCULAR; INTRAVENOUS PRN
Status: DISCONTINUED | OUTPATIENT
Start: 2025-05-12 | End: 2025-05-12 | Stop reason: HOSPADM

## 2025-05-12 RX ORDER — MIDAZOLAM HYDROCHLORIDE 1 MG/ML
INJECTION, SOLUTION INTRAMUSCULAR; INTRAVENOUS PRN
Status: DISCONTINUED | OUTPATIENT
Start: 2025-05-12 | End: 2025-05-12 | Stop reason: HOSPADM

## 2025-05-12 ASSESSMENT — PULMONARY FUNCTION TESTS
PIF_VALUE: 0

## 2025-05-12 NOTE — PROGRESS NOTES
Received post procedure to [facility] to room 3. Assessment obtained. Restrictions reviewed with patient. Post procedure pathway initiated.  Right groin site soft, clean dry and intact.  No hematoma noted.  Family at side.  Patient without complaints. Head of bed flat with right leg straight.

## 2025-05-12 NOTE — PROGRESS NOTES
Patient admitted, consent signed and questions answered. Patient ready for procedure. Call light to reach with side rails up 2 of 2. Right groin clipped with writer and Bambi RN present.  Family at bedside with patient.  History and physical needs to be completed.

## 2025-05-12 NOTE — PROCEDURES
PROCEDURE NOTE  Date: 5/12/2025   Name: Krista Chiang  YOB: 1980    Procedures        Procedure is EP study, 3D mapping using EnSite, radiofrequency ablation of the slow pathway, repeat EP study on Isopril.    Preoperative diagnosis is symptomatic repeated episodes of SVT despite medical therapy required repeated emergency room visits and even cardioversions.    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  At the termination of the procedure no inducible tachycardias of any type.    Patient was brought to the EP lab in the postabsorptive state.  Vitals were stable.  Informed consent was obtained.  The right groin was prepared and draped in the usual manner.  The right groin was infiltrated with 2% Xylocaine.  Guided by ultrasonography the right femoral vein was captured 3 separate times.  3 different sheath and introducers were advanced over one of the guidewires at the time.  Dilators and guidewires were removed and replaced with duo-decca catheter that was advanced to the high right atrium and CS, a quadripolar catheter that was advanced to the RV outflow, and tactic cath sensor catheter by EnSite was used for hiss location and mapping.    Baseline numbers    AH 81 ms HV 54 ms.    At 600 in the atrium the AV ninfa ERP was 340 ms at 4 the fast AV ninfa pathway and 260 for the slow AV ninfa pathway.    At 500 ms it was 350 ms and 270 ms respectively.    Baseline AV ninfa Wenckebach was 400 ms.    Pacing in the V at 600 the VA ERP was 430 and at 500 it was 420 ms.    We started Isopril infusion.  EP study was repeated.  We went up to 2 mics gradually, we induced typical AV ninfa reentrant tachycardia by pacing at 500/400 milliseconds.  The tachycardia cycle length was 350 ms.  It was easily terminated by straight atrial pacing.    At that point with the 3D mapping we identified the AV ninfa slow pathway

## 2025-05-12 NOTE — DISCHARGE INSTRUCTIONS
DISCHARGE INSTRUCTION   Catheter ablation is a procedure that treats heart rhythm problems. These problems include atrial fibrillation, supraventricular tachycardia (SVT), atrial flutter, and ventricular tachycardia.  Your heart should have a strong, steady beat. That beat is controlled by the heart's electrical system. Sometimes that system misfires. This causes a heartbeat that is too fast and isn't steady.  Catheter ablation is a way to get into your heart and fix the problem. Ablation is not surgery.  How is catheter ablation done?  Your doctor inserts thin tubes called catheters into a blood vessel in your groin, arm, or neck. Then your doctor feeds them into the heart. Wires in the catheters help the doctor find the problem areas. Then he or she uses the wires to send energy to destroy the tiny areas of heart tissue that are causing the problems.  It may seem like a bad idea to destroy parts of your heart on purpose. But the areas that are destroyed are very tiny. They should not affect your heart's ability to do its job.  You may be awake during the procedure. Or you may be asleep. The doctor will give you medicines to help you feel relaxed and to numb the areas where the catheters go in. You may feel a little uncomfortable, but you should not feel pain.  This procedure usually takes 2 to 6 hours. In rare cases, it can take longer. You may stay overnight in the hospital. How long you stay in the hospital depends on the type of ablation you have.  What can you expect after catheter ablation?  Do not exercise hard or lift anything heavy for a week. You will probably be able to go back to work and to your normal routine in 1 or 2 days.  You may have swelling, bruising, or a small lump around the site where the catheters went into your body. These should go away in 3 to 4 weeks.  You may have to take some medicines for a while.  Follow-up care is a key part of your treatment and safety. Be sure to make and go to

## 2025-05-12 NOTE — DISCHARGE SUMMARY
glargine (LANTUS) 100 UNIT/ML injection vial  Comments:  Reason for Stopping:    flecainide (TAMBOCOR) 100 MG tablet  Comments:  Reason for Stopping:    melatonin 3 MG TABS tablet  Comments:  Reason for Stopping:    LORazepam (ATIVAN) 1 MG tablet  Comments:  Reason for Stopping:          Time Spent on Discharge:  30 minutes were spent in patient examination, evaluation, counseling as well as medication reconciliation, prescriptions for required medications, discharge plan, and follow up.    Electronically signed by Sherman Rucker MD on 5/12/25 at 11:34 AM EDT

## 2025-05-12 NOTE — H&P
Krista Chiang is a 44 y.o. female who presents today for an general visit to be evaluated for the following condition(s):       Chief Complaint   Patient presents with    Follow-up       Follow up from hospital tachycardia    Repeated bouts of palpitation.  With documentation in the hospital of wide QRS tachycardia on March 11, 2025 at 330 1 in the morning [place].  Also at 1 point in December 2023 she had narrow QRS tachycardia suggestive of SVT.  May be a short RP type tachycardia.  The rates are totally different.  The wide QRS is 229 bpm of left bundle inferior axis probably.  I am not sure if it is ventricular tachycardia or supraventricular tachycardia but the rate is totally different.  In my note in the past patient went from narrow to wide and from wide to narrow suggestive to me probably that there is 1 type of tachycardia although I could not rule out 2 types of tachycardias.     Patient is on flecainide 100 mg p.o. twice daily and Lopressor 50 mg p.o. daily.  Symptoms been going on for years.  She gets 1-2 events every month she said.     Options of EP and ablation was discussed with the patient in the presence of her mother.  They want to think about it.  They understand that medication is an option she already failed Tambocor.     EF is acceptable.  No other structural heart disease.     Will provide with reading material patient and family will make the decision     REVIEW OF SYSTEM                                                                       Review of Systems noncontributory at this point     REVIEWED INFORMATION                                                                       Allergies         Allergies   Allergen Reactions    Aripiprazole Itching and Swelling       Other reaction(s): Other  Abilify    Aspirin         Patient is on chronic anticoagulation  Patient is on chronic anticoagulation  Patient is on chronic anticoagulation    Betadine [Povidone Iodine] Rash

## 2025-05-18 ENCOUNTER — APPOINTMENT (OUTPATIENT)
Dept: GENERAL RADIOLOGY | Age: 45
End: 2025-05-18
Payer: COMMERCIAL

## 2025-05-18 ENCOUNTER — APPOINTMENT (OUTPATIENT)
Dept: CT IMAGING | Age: 45
End: 2025-05-18
Payer: COMMERCIAL

## 2025-05-18 ENCOUNTER — HOSPITAL ENCOUNTER (EMERGENCY)
Age: 45
Discharge: HOME | End: 2025-05-18
Attending: EMERGENCY MEDICINE
Payer: COMMERCIAL

## 2025-05-18 VITALS
RESPIRATION RATE: 11 BRPM | HEIGHT: 68 IN | DIASTOLIC BLOOD PRESSURE: 45 MMHG | BODY MASS INDEX: 28.79 KG/M2 | SYSTOLIC BLOOD PRESSURE: 147 MMHG | OXYGEN SATURATION: 96 % | HEART RATE: 84 BPM | TEMPERATURE: 98.3 F | WEIGHT: 190 LBS

## 2025-05-18 DIAGNOSIS — E86.0 DEHYDRATION: ICD-10-CM

## 2025-05-18 DIAGNOSIS — R11.2 NAUSEA AND VOMITING, UNSPECIFIED VOMITING TYPE: Primary | ICD-10-CM

## 2025-05-18 DIAGNOSIS — G89.29 CHRONIC LEFT HIP PAIN: ICD-10-CM

## 2025-05-18 DIAGNOSIS — M25.552 CHRONIC LEFT HIP PAIN: ICD-10-CM

## 2025-05-18 LAB
ALBUMIN SERPL-MCNC: 3.1 G/DL (ref 3.5–5.2)
ALP SERPL-CCNC: 144 U/L (ref 35–104)
ALT SERPL-CCNC: 31 U/L (ref 10–35)
ANION GAP SERPL CALCULATED.3IONS-SCNC: 12 MMOL/L (ref 9–16)
AST SERPL-CCNC: 27 U/L (ref 10–35)
BASOPHILS # BLD: 0.05 K/UL (ref 0–0.2)
BASOPHILS NFR BLD: 1 % (ref 0–2)
BILIRUB SERPL-MCNC: <0.2 MG/DL (ref 0–1.2)
BILIRUB UR QL STRIP: NEGATIVE
BUN SERPL-MCNC: 16 MG/DL (ref 6–20)
CALCIUM SERPL-MCNC: 8.5 MG/DL (ref 8.6–10.4)
CHLORIDE SERPL-SCNC: 108 MMOL/L (ref 98–107)
CLARITY UR: CLEAR
CO2 SERPL-SCNC: 17 MMOL/L (ref 20–31)
COLOR UR: YELLOW
COMMENT: ABNORMAL
CREAT SERPL-MCNC: 0.6 MG/DL (ref 0.7–1.2)
EOSINOPHIL # BLD: 0.12 K/UL (ref 0–0.44)
EOSINOPHILS RELATIVE PERCENT: 2 % (ref 0–4)
ERYTHROCYTE [DISTWIDTH] IN BLOOD BY AUTOMATED COUNT: 16.7 % (ref 11.5–14.9)
GFR, ESTIMATED: >90 ML/MIN/1.73M2
GLUCOSE SERPL-MCNC: 141 MG/DL (ref 74–99)
GLUCOSE UR STRIP-MCNC: ABNORMAL MG/DL
HCT VFR BLD AUTO: 33.1 % (ref 36–46)
HGB BLD-MCNC: 10.6 G/DL (ref 12–16)
HGB UR QL STRIP.AUTO: NEGATIVE
IMM GRANULOCYTES # BLD AUTO: <0.03 K/UL (ref 0–0.3)
IMM GRANULOCYTES NFR BLD: 0 %
KETONES UR STRIP-MCNC: NEGATIVE MG/DL
LACTATE BLDV-SCNC: 1.2 MMOL/L (ref 0.5–2.2)
LEUKOCYTE ESTERASE UR QL STRIP: NEGATIVE
LIPASE SERPL-CCNC: 10 U/L (ref 13–60)
LYMPHOCYTES NFR BLD: 1.98 K/UL (ref 1.1–3.7)
LYMPHOCYTES RELATIVE PERCENT: 30 % (ref 24–44)
MCH RBC QN AUTO: 29.8 PG (ref 26–34)
MCHC RBC AUTO-ENTMCNC: 32 G/DL (ref 31–37)
MCV RBC AUTO: 93 FL (ref 80–100)
MONOCYTES NFR BLD: 0.38 K/UL (ref 0.1–1.2)
MONOCYTES NFR BLD: 6 % (ref 3–12)
NEUTROPHILS NFR BLD: 61 % (ref 36–66)
NEUTS SEG NFR BLD: 4.14 K/UL (ref 1.5–8.1)
NITRITE UR QL STRIP: NEGATIVE
NRBC BLD-RTO: 0 PER 100 WBC
PH UR STRIP: 5.5 [PH] (ref 5–8)
PLATELET # BLD AUTO: 360 K/UL (ref 150–450)
PMV BLD AUTO: 12.2 FL (ref 8–13.5)
POTASSIUM SERPL-SCNC: 4.2 MMOL/L (ref 3.7–5.3)
PROT SERPL-MCNC: 5.9 G/DL (ref 6.6–8.7)
PROT UR STRIP-MCNC: NEGATIVE MG/DL
RBC # BLD AUTO: 3.56 M/UL (ref 3.95–5.11)
SODIUM SERPL-SCNC: 137 MMOL/L (ref 136–145)
SP GR UR STRIP: 1.02 (ref 1–1.03)
TROPONIN I SERPL HS-MCNC: 15 NG/L (ref 0–14)
TROPONIN I SERPL HS-MCNC: 16 NG/L (ref 0–14)
UROBILINOGEN UR STRIP-ACNC: NORMAL EU/DL (ref 0–1)
WBC OTHER # BLD: 6.7 K/UL (ref 3.5–11)

## 2025-05-18 PROCEDURE — 2580000003 HC RX 258

## 2025-05-18 PROCEDURE — 96374 THER/PROPH/DIAG INJ IV PUSH: CPT

## 2025-05-18 PROCEDURE — 96375 TX/PRO/DX INJ NEW DRUG ADDON: CPT

## 2025-05-18 PROCEDURE — 99285 EMERGENCY DEPT VISIT HI MDM: CPT

## 2025-05-18 PROCEDURE — 81003 URINALYSIS AUTO W/O SCOPE: CPT

## 2025-05-18 PROCEDURE — 73502 X-RAY EXAM HIP UNI 2-3 VIEWS: CPT

## 2025-05-18 PROCEDURE — 96372 THER/PROPH/DIAG INJ SC/IM: CPT

## 2025-05-18 PROCEDURE — 2500000003 HC RX 250 WO HCPCS

## 2025-05-18 PROCEDURE — 6360000004 HC RX CONTRAST MEDICATION

## 2025-05-18 PROCEDURE — 6360000002 HC RX W HCPCS

## 2025-05-18 PROCEDURE — 84484 ASSAY OF TROPONIN QUANT: CPT

## 2025-05-18 PROCEDURE — 93005 ELECTROCARDIOGRAM TRACING: CPT

## 2025-05-18 PROCEDURE — 96376 TX/PRO/DX INJ SAME DRUG ADON: CPT

## 2025-05-18 PROCEDURE — 80053 COMPREHEN METABOLIC PANEL: CPT

## 2025-05-18 PROCEDURE — 85025 COMPLETE CBC W/AUTO DIFF WBC: CPT

## 2025-05-18 PROCEDURE — 71045 X-RAY EXAM CHEST 1 VIEW: CPT

## 2025-05-18 PROCEDURE — 74177 CT ABD & PELVIS W/CONTRAST: CPT

## 2025-05-18 PROCEDURE — 83690 ASSAY OF LIPASE: CPT

## 2025-05-18 PROCEDURE — 73030 X-RAY EXAM OF SHOULDER: CPT

## 2025-05-18 PROCEDURE — 96361 HYDRATE IV INFUSION ADD-ON: CPT

## 2025-05-18 PROCEDURE — 83605 ASSAY OF LACTIC ACID: CPT

## 2025-05-18 PROCEDURE — 36415 COLL VENOUS BLD VENIPUNCTURE: CPT

## 2025-05-18 RX ORDER — IOPAMIDOL 755 MG/ML
75 INJECTION, SOLUTION INTRAVASCULAR
Status: COMPLETED | OUTPATIENT
Start: 2025-05-18 | End: 2025-05-18

## 2025-05-18 RX ORDER — SODIUM CHLORIDE 0.9 % (FLUSH) 0.9 %
10 SYRINGE (ML) INJECTION PRN
Status: DISCONTINUED | OUTPATIENT
Start: 2025-05-18 | End: 2025-05-19 | Stop reason: HOSPADM

## 2025-05-18 RX ORDER — ONDANSETRON 2 MG/ML
4 INJECTION INTRAMUSCULAR; INTRAVENOUS ONCE
Status: COMPLETED | OUTPATIENT
Start: 2025-05-18 | End: 2025-05-18

## 2025-05-18 RX ORDER — FENTANYL CITRATE 0.05 MG/ML
50 INJECTION, SOLUTION INTRAMUSCULAR; INTRAVENOUS ONCE
Status: COMPLETED | OUTPATIENT
Start: 2025-05-18 | End: 2025-05-18

## 2025-05-18 RX ORDER — 0.9 % SODIUM CHLORIDE 0.9 %
100 INTRAVENOUS SOLUTION INTRAVENOUS ONCE
Status: COMPLETED | OUTPATIENT
Start: 2025-05-18 | End: 2025-05-18

## 2025-05-18 RX ORDER — PROMETHAZINE HYDROCHLORIDE 25 MG/ML
25 INJECTION, SOLUTION INTRAMUSCULAR; INTRAVENOUS ONCE
Status: COMPLETED | OUTPATIENT
Start: 2025-05-18 | End: 2025-05-18

## 2025-05-18 RX ORDER — PROMETHAZINE HYDROCHLORIDE 12.5 MG/1
12.5 TABLET ORAL 3 TIMES DAILY PRN
Qty: 12 TABLET | Refills: 0 | Status: SHIPPED | OUTPATIENT
Start: 2025-05-18 | End: 2025-05-25

## 2025-05-18 RX ORDER — SODIUM CHLORIDE, SODIUM LACTATE, POTASSIUM CHLORIDE, AND CALCIUM CHLORIDE .6; .31; .03; .02 G/100ML; G/100ML; G/100ML; G/100ML
1000 INJECTION, SOLUTION INTRAVENOUS ONCE
Status: COMPLETED | OUTPATIENT
Start: 2025-05-18 | End: 2025-05-18

## 2025-05-18 RX ADMIN — FENTANYL CITRATE 50 MCG: 0.05 INJECTION, SOLUTION INTRAMUSCULAR; INTRAVENOUS at 18:51

## 2025-05-18 RX ADMIN — HYDROMORPHONE HYDROCHLORIDE 0.25 MG: 1 INJECTION, SOLUTION INTRAMUSCULAR; INTRAVENOUS; SUBCUTANEOUS at 21:31

## 2025-05-18 RX ADMIN — ONDANSETRON 4 MG: 2 INJECTION, SOLUTION INTRAMUSCULAR; INTRAVENOUS at 18:51

## 2025-05-18 RX ADMIN — ONDANSETRON 4 MG: 2 INJECTION, SOLUTION INTRAMUSCULAR; INTRAVENOUS at 20:48

## 2025-05-18 RX ADMIN — SODIUM CHLORIDE, SODIUM LACTATE, POTASSIUM CHLORIDE, AND CALCIUM CHLORIDE 1000 ML: .6; .31; .03; .02 INJECTION, SOLUTION INTRAVENOUS at 18:50

## 2025-05-18 RX ADMIN — FENTANYL CITRATE 50 MCG: 50 INJECTION INTRAMUSCULAR; INTRAVENOUS at 20:48

## 2025-05-18 RX ADMIN — SODIUM CHLORIDE, PRESERVATIVE FREE 10 ML: 5 INJECTION INTRAVENOUS at 19:32

## 2025-05-18 RX ADMIN — SODIUM CHLORIDE 100 ML: 9 INJECTION, SOLUTION INTRAVENOUS at 19:33

## 2025-05-18 RX ADMIN — PROMETHAZINE HYDROCHLORIDE 25 MG: 25 INJECTION INTRAMUSCULAR; INTRAVENOUS at 21:35

## 2025-05-18 RX ADMIN — IOPAMIDOL 75 ML: 755 INJECTION, SOLUTION INTRAVENOUS at 19:32

## 2025-05-18 ASSESSMENT — PAIN DESCRIPTION - DESCRIPTORS
DESCRIPTORS: ACHING
DESCRIPTORS: SHARP;DULL
DESCRIPTORS: SHARP

## 2025-05-18 ASSESSMENT — PAIN SCALES - GENERAL
PAINLEVEL_OUTOF10: 7
PAINLEVEL_OUTOF10: 8
PAINLEVEL_OUTOF10: 7

## 2025-05-18 ASSESSMENT — PAIN - FUNCTIONAL ASSESSMENT: PAIN_FUNCTIONAL_ASSESSMENT: 0-10

## 2025-05-18 ASSESSMENT — PAIN DESCRIPTION - LOCATION
LOCATION: HIP
LOCATION: HIP;BACK
LOCATION: HIP
LOCATION: HIP

## 2025-05-18 ASSESSMENT — PAIN DESCRIPTION - ORIENTATION
ORIENTATION: LEFT

## 2025-05-18 NOTE — ED PROVIDER NOTES
[facility] EMERGENCY DEPARTMENT  eMERGENCY dEPARTMENT eNCOUnter   Attending Attestation     Pt Name: Krista Chiang  MRN: [id number]  Birthdate 1980  Date of evaluation: 5/18/25       Krista Chiang is a 44 y.o. female who presents with Dizziness, Vomiting, and Fall (Pt. States for the last four days has been dizzy/nauseous and having falls. Pt. Denies hitting head or LOC during this falls. Today fell on left hip and experiencing pain in this air (left hip has been completely removed). Pt. Had heart ablation on Mon. [facility]. )      History:   Patient is here complaining of a fall due to the fact that she has been lightheaded and dizzy for the last few days.  Patient's also been vomiting and very nauseous.  Patient is hurting on the left hip again where she has had pain in the past.  Patient had no loss consciousness.  Patient did call the cardiologist because she had an ablation done earlier in the week but they did not really say anything.    Exam: Vitals:   Vitals:    05/18/25 1735 05/18/25 1751   BP: (!) 137/92    Pulse: (!) 102 89   Resp: 18    Temp: 98.3 °F (36.8 °C)    TempSrc: Oral    SpO2: 100%    Weight: 86.2 kg (190 lb)    Height: 1.727 m (5' 8\")      Heart regular rate rhythm no murmurs.  Lungs clear to auscultation bilaterally.  Abdomen soft nontender nondistended.  Patient does have some tenderness palpation with decreased range of motion of the left hip but no deformity.    I performed a history and physical examination of the patient and discussed management with the resident. I reviewed the resident’s note and agree with the documented findings and plan of care. Any areas of disagreement are noted on the chart. I was personally present for the key portions of any procedures. I have documented in the chart those procedures where I was not present during the key portions. I have personally reviewed all images and agree with the resident's interpretation. I have reviewed the emergency

## 2025-05-18 NOTE — ED PROVIDER NOTES
[facility] EMERGENCY DEPARTMENT  Emergency Department Encounter  Emergency Medicine Resident     Pt Name:Krista Chiang  MRN: [id number]  Birthdate 1980  Date of evaluation: 5/18/25  PCP:  Arielle Melton APRN - NP  Note Started: 5:32 PM EDT      CHIEF COMPLAINT       Chief Complaint   Patient presents with    Dizziness    Vomiting    Fall     Pt. States for the last four days has been dizzy/nauseous and having falls. Pt. Denies hitting head or LOC during this falls. Today fell on left hip and experiencing pain in this air (left hip has been completely removed). Pt. Had heart ablation on Mon. At [facility].        HISTORY OF PRESENT ILLNESS  (Location/Symptom, Timing/Onset, Context/Setting, Quality, Duration, Modifying Factors, Severity.)      Krista Chiang is a 44 y.o. female who presents with complaints of feeling lightheaded, having nausea and vomiting for the last 4 days, as well as a fall today.  Patient states that she felt lightheaded today after standing up from her recliner and subsequently fell to the ground landing on her left hip and left shoulder.  She states she did not hit her head or lose consciousness.  Patient reports that she is on fondaparinux for antiphospholipid antibody syndrome.  She did take her normal doses.  She reports that she recently had an ablation on Monday at [address] for SVT, and states that she felt fine after her ablation, but over the last 4 days started to develop some nausea and vomiting as well as inability to tolerate p.o. intake, and it has subsequently made her feel very weak and lightheaded when she stands up from a seated position.  She denies any chest pain or shortness of breath.  Denies any abdominal pain.  She is complaining of pain in her left hip which she states she has chronic issues with.  She is also complaining of some pain in her left shoulder.    PAST MEDICAL / SURGICAL / SOCIAL / FAMILY HISTORY      has a past medical history of

## 2025-05-19 LAB
EKG ATRIAL RATE: 81 BPM
EKG P AXIS: 65 DEGREES
EKG P-R INTERVAL: 148 MS
EKG Q-T INTERVAL: 398 MS
EKG QRS DURATION: 88 MS
EKG QTC CALCULATION (BAZETT): 462 MS
EKG R AXIS: 55 DEGREES
EKG T AXIS: 32 DEGREES
EKG VENTRICULAR RATE: 81 BPM

## 2025-06-12 ENCOUNTER — APPOINTMENT (OUTPATIENT)
Dept: CT IMAGING | Age: 45
DRG: 178 | End: 2025-06-12
Payer: MEDICARE

## 2025-06-12 ENCOUNTER — APPOINTMENT (OUTPATIENT)
Dept: GENERAL RADIOLOGY | Age: 45
DRG: 178 | End: 2025-06-12
Payer: MEDICARE

## 2025-06-12 ENCOUNTER — HOSPITAL ENCOUNTER (INPATIENT)
Age: 45
LOS: 6 days | Discharge: HOME HEALTH | DRG: 178 | End: 2025-06-18
Attending: STUDENT IN AN ORGANIZED HEALTH CARE EDUCATION/TRAINING PROGRAM | Admitting: INTERNAL MEDICINE
Payer: MEDICARE

## 2025-06-12 DIAGNOSIS — J18.9 PNEUMONIA DUE TO INFECTIOUS ORGANISM, UNSPECIFIED LATERALITY, UNSPECIFIED PART OF LUNG: ICD-10-CM

## 2025-06-12 DIAGNOSIS — E16.2 HYPOGLYCEMIA: Primary | ICD-10-CM

## 2025-06-12 PROBLEM — E44.0 MODERATE MALNUTRITION: Status: ACTIVE | Noted: 2023-01-01

## 2025-06-12 PROBLEM — J15.9 COMMUNITY ACQUIRED BACTERIAL PNEUMONIA: Status: ACTIVE | Noted: 2025-06-12

## 2025-06-12 LAB
ALBUMIN SERPL-MCNC: 3.2 G/DL (ref 3.5–5.2)
ALP SERPL-CCNC: 110 U/L (ref 35–104)
ALT SERPL-CCNC: 18 U/L (ref 10–35)
AMMONIA PLAS-SCNC: 22 UMOL/L (ref 11–51)
AMPHET UR QL SCN: NEGATIVE
ANION GAP SERPL CALCULATED.3IONS-SCNC: 12 MMOL/L (ref 9–16)
AST SERPL-CCNC: 19 U/L (ref 10–35)
BARBITURATES UR QL SCN: NEGATIVE
BASOPHILS # BLD: 0.05 K/UL (ref 0–0.2)
BASOPHILS NFR BLD: 1 % (ref 0–2)
BENZODIAZ UR QL: NEGATIVE
BILIRUB SERPL-MCNC: <0.2 MG/DL (ref 0–1.2)
BILIRUB UR QL STRIP: NEGATIVE
BUN SERPL-MCNC: 16 MG/DL (ref 6–20)
CALCIUM SERPL-MCNC: 8.9 MG/DL (ref 8.6–10.4)
CANNABINOIDS UR QL SCN: NEGATIVE
CHLORIDE SERPL-SCNC: 109 MMOL/L (ref 98–107)
CHP ED QC CHECK: NORMAL
CLARITY UR: CLEAR
CO2 SERPL-SCNC: 17 MMOL/L (ref 20–31)
COCAINE UR QL SCN: NEGATIVE
COLOR UR: YELLOW
COMMENT: ABNORMAL
CREAT SERPL-MCNC: 0.7 MG/DL (ref 0.7–1.2)
EOSINOPHIL # BLD: 0.13 K/UL (ref 0–0.44)
EOSINOPHILS RELATIVE PERCENT: 1 % (ref 0–4)
ERYTHROCYTE [DISTWIDTH] IN BLOOD BY AUTOMATED COUNT: 15.3 % (ref 11.5–14.9)
ETHANOL PERCENT: 0 %
ETHANOLAMINE SERPL-MCNC: <10 MG/DL (ref 0–0.08)
FENTANYL UR QL: NEGATIVE
GFR, ESTIMATED: >90 ML/MIN/1.73M2
GLUCOSE BLD-MCNC: 112 MG/DL (ref 65–105)
GLUCOSE BLD-MCNC: 120 MG/DL
GLUCOSE BLD-MCNC: 120 MG/DL (ref 65–105)
GLUCOSE BLD-MCNC: 129 MG/DL
GLUCOSE BLD-MCNC: 158 MG/DL (ref 65–105)
GLUCOSE BLD-MCNC: 68 MG/DL (ref 65–105)
GLUCOSE BLD-MCNC: 70 MG/DL
GLUCOSE BLD-MCNC: 88 MG/DL (ref 65–105)
GLUCOSE SERPL-MCNC: 112 MG/DL (ref 74–99)
GLUCOSE UR STRIP-MCNC: ABNORMAL MG/DL
HCT VFR BLD AUTO: 32.3 % (ref 36–46)
HGB BLD-MCNC: 9.7 G/DL (ref 12–16)
HGB UR QL STRIP.AUTO: NEGATIVE
IMM GRANULOCYTES # BLD AUTO: 0.05 K/UL (ref 0–0.3)
IMM GRANULOCYTES NFR BLD: 1 %
INR PPP: 1
KETONES UR STRIP-MCNC: NEGATIVE MG/DL
LACTATE BLDV-SCNC: 1.6 MMOL/L (ref 0.5–1.9)
LEUKOCYTE ESTERASE UR QL STRIP: NEGATIVE
LYMPHOCYTES NFR BLD: 1.68 K/UL (ref 1.1–3.7)
LYMPHOCYTES RELATIVE PERCENT: 17 % (ref 24–44)
MAGNESIUM SERPL-MCNC: 1.9 MG/DL (ref 1.6–2.6)
MCH RBC QN AUTO: 28.8 PG (ref 26–34)
MCHC RBC AUTO-ENTMCNC: 30 G/DL (ref 31–37)
MCV RBC AUTO: 95.8 FL (ref 80–100)
METHADONE UR QL: NEGATIVE
MONOCYTES NFR BLD: 0.74 K/UL (ref 0.1–1.2)
MONOCYTES NFR BLD: 7 % (ref 3–12)
NEUTROPHILS NFR BLD: 73 % (ref 36–66)
NEUTS SEG NFR BLD: 7.47 K/UL (ref 1.5–8.1)
NITRITE UR QL STRIP: NEGATIVE
NRBC BLD-RTO: 0 PER 100 WBC
OPIATES UR QL SCN: NEGATIVE
OXYCODONE UR QL SCN: NEGATIVE
PCP UR QL SCN: NEGATIVE
PH UR STRIP: 5.5 [PH] (ref 5–8)
PLATELET # BLD AUTO: 338 K/UL (ref 150–450)
PMV BLD AUTO: 12 FL (ref 8–13.5)
POTASSIUM SERPL-SCNC: 3.9 MMOL/L (ref 3.7–5.3)
PROT SERPL-MCNC: 5.9 G/DL (ref 6.6–8.7)
PROT UR STRIP-MCNC: NEGATIVE MG/DL
PROTHROMBIN TIME: 14 SEC (ref 11.8–14.6)
RBC # BLD AUTO: 3.37 M/UL (ref 3.95–5.11)
SODIUM SERPL-SCNC: 138 MMOL/L (ref 136–145)
SP GR UR STRIP: 1.01 (ref 1–1.03)
TEST INFORMATION: NORMAL
TROPONIN I SERPL HS-MCNC: 11 NG/L (ref 0–14)
TROPONIN I SERPL HS-MCNC: 8 NG/L (ref 0–14)
TSH SERPL DL<=0.05 MIU/L-ACNC: 2.28 UIU/ML (ref 0.27–4.2)
UROBILINOGEN UR STRIP-ACNC: NORMAL EU/DL (ref 0–1)
WBC OTHER # BLD: 10.1 K/UL (ref 3.5–11)

## 2025-06-12 PROCEDURE — 36415 COLL VENOUS BLD VENIPUNCTURE: CPT

## 2025-06-12 PROCEDURE — 99285 EMERGENCY DEPT VISIT HI MDM: CPT

## 2025-06-12 PROCEDURE — 71045 X-RAY EXAM CHEST 1 VIEW: CPT

## 2025-06-12 PROCEDURE — 83605 ASSAY OF LACTIC ACID: CPT

## 2025-06-12 PROCEDURE — 96376 TX/PRO/DX INJ SAME DRUG ADON: CPT

## 2025-06-12 PROCEDURE — 85025 COMPLETE CBC W/AUTO DIFF WBC: CPT

## 2025-06-12 PROCEDURE — 2580000003 HC RX 258: Performed by: STUDENT IN AN ORGANIZED HEALTH CARE EDUCATION/TRAINING PROGRAM

## 2025-06-12 PROCEDURE — 85610 PROTHROMBIN TIME: CPT

## 2025-06-12 PROCEDURE — 99223 1ST HOSP IP/OBS HIGH 75: CPT | Performed by: INTERNAL MEDICINE

## 2025-06-12 PROCEDURE — G0480 DRUG TEST DEF 1-7 CLASSES: HCPCS

## 2025-06-12 PROCEDURE — 80053 COMPREHEN METABOLIC PANEL: CPT

## 2025-06-12 PROCEDURE — 6360000002 HC RX W HCPCS: Performed by: NURSE PRACTITIONER

## 2025-06-12 PROCEDURE — 81003 URINALYSIS AUTO W/O SCOPE: CPT

## 2025-06-12 PROCEDURE — 6370000000 HC RX 637 (ALT 250 FOR IP): Performed by: NURSE PRACTITIONER

## 2025-06-12 PROCEDURE — 87040 BLOOD CULTURE FOR BACTERIA: CPT

## 2025-06-12 PROCEDURE — 96374 THER/PROPH/DIAG INJ IV PUSH: CPT

## 2025-06-12 PROCEDURE — 83735 ASSAY OF MAGNESIUM: CPT

## 2025-06-12 PROCEDURE — 93005 ELECTROCARDIOGRAM TRACING: CPT | Performed by: STUDENT IN AN ORGANIZED HEALTH CARE EDUCATION/TRAINING PROGRAM

## 2025-06-12 PROCEDURE — 96375 TX/PRO/DX INJ NEW DRUG ADDON: CPT

## 2025-06-12 PROCEDURE — 82947 ASSAY GLUCOSE BLOOD QUANT: CPT

## 2025-06-12 PROCEDURE — 80307 DRUG TEST PRSMV CHEM ANLYZR: CPT

## 2025-06-12 PROCEDURE — 87641 MR-STAPH DNA AMP PROBE: CPT

## 2025-06-12 PROCEDURE — 74176 CT ABD & PELVIS W/O CONTRAST: CPT

## 2025-06-12 PROCEDURE — 84443 ASSAY THYROID STIM HORMONE: CPT

## 2025-06-12 PROCEDURE — 72125 CT NECK SPINE W/O DYE: CPT

## 2025-06-12 PROCEDURE — 70450 CT HEAD/BRAIN W/O DYE: CPT

## 2025-06-12 PROCEDURE — 82140 ASSAY OF AMMONIA: CPT

## 2025-06-12 PROCEDURE — 1200000000 HC SEMI PRIVATE

## 2025-06-12 PROCEDURE — 84484 ASSAY OF TROPONIN QUANT: CPT

## 2025-06-12 PROCEDURE — 2580000003 HC RX 258: Performed by: NURSE PRACTITIONER

## 2025-06-12 PROCEDURE — 6360000002 HC RX W HCPCS: Performed by: STUDENT IN AN ORGANIZED HEALTH CARE EDUCATION/TRAINING PROGRAM

## 2025-06-12 RX ORDER — GUAIFENESIN 600 MG/1
600 TABLET, EXTENDED RELEASE ORAL 2 TIMES DAILY PRN
Status: DISCONTINUED | OUTPATIENT
Start: 2025-06-12 | End: 2025-06-18 | Stop reason: HOSPADM

## 2025-06-12 RX ORDER — SUCRALFATE 1 G/1
1 TABLET ORAL 4 TIMES DAILY
Status: DISCONTINUED | OUTPATIENT
Start: 2025-06-12 | End: 2025-06-18 | Stop reason: HOSPADM

## 2025-06-12 RX ORDER — ONDANSETRON 2 MG/ML
4 INJECTION INTRAMUSCULAR; INTRAVENOUS ONCE
Status: COMPLETED | OUTPATIENT
Start: 2025-06-12 | End: 2025-06-12

## 2025-06-12 RX ORDER — SODIUM CHLORIDE 9 MG/ML
INJECTION, SOLUTION INTRAVENOUS PRN
Status: DISCONTINUED | OUTPATIENT
Start: 2025-06-12 | End: 2025-06-18 | Stop reason: HOSPADM

## 2025-06-12 RX ORDER — SODIUM CHLORIDE 0.9 % (FLUSH) 0.9 %
5-40 SYRINGE (ML) INJECTION EVERY 12 HOURS SCHEDULED
Status: DISCONTINUED | OUTPATIENT
Start: 2025-06-12 | End: 2025-06-18 | Stop reason: HOSPADM

## 2025-06-12 RX ORDER — SODIUM CHLORIDE 0.9 % (FLUSH) 0.9 %
10 SYRINGE (ML) INJECTION ONCE
Status: DISCONTINUED | OUTPATIENT
Start: 2025-06-12 | End: 2025-06-12

## 2025-06-12 RX ORDER — 0.9 % SODIUM CHLORIDE 0.9 %
100 INTRAVENOUS SOLUTION INTRAVENOUS ONCE
Status: DISCONTINUED | OUTPATIENT
Start: 2025-06-12 | End: 2025-06-12

## 2025-06-12 RX ORDER — INSULIN LISPRO 100 [IU]/ML
0-8 INJECTION, SOLUTION INTRAVENOUS; SUBCUTANEOUS
Status: DISCONTINUED | OUTPATIENT
Start: 2025-06-12 | End: 2025-06-18 | Stop reason: HOSPADM

## 2025-06-12 RX ORDER — METOPROLOL TARTRATE 50 MG
50 TABLET ORAL 2 TIMES DAILY
Status: DISCONTINUED | OUTPATIENT
Start: 2025-06-12 | End: 2025-06-13

## 2025-06-12 RX ORDER — PROCHLORPERAZINE EDISYLATE 5 MG/ML
10 INJECTION INTRAMUSCULAR; INTRAVENOUS EVERY 6 HOURS PRN
Status: DISCONTINUED | OUTPATIENT
Start: 2025-06-12 | End: 2025-06-18 | Stop reason: HOSPADM

## 2025-06-12 RX ORDER — SODIUM CHLORIDE 0.9 % (FLUSH) 0.9 %
5-40 SYRINGE (ML) INJECTION PRN
Status: DISCONTINUED | OUTPATIENT
Start: 2025-06-12 | End: 2025-06-18 | Stop reason: HOSPADM

## 2025-06-12 RX ORDER — FENTANYL CITRATE 0.05 MG/ML
50 INJECTION, SOLUTION INTRAMUSCULAR; INTRAVENOUS ONCE
Status: COMPLETED | OUTPATIENT
Start: 2025-06-12 | End: 2025-06-12

## 2025-06-12 RX ORDER — FENTANYL CITRATE 0.05 MG/ML
50 INJECTION, SOLUTION INTRAMUSCULAR; INTRAVENOUS ONCE
Status: DISCONTINUED | OUTPATIENT
Start: 2025-06-12 | End: 2025-06-12

## 2025-06-12 RX ORDER — MIDODRINE HYDROCHLORIDE 2.5 MG/1
2.5 TABLET ORAL 2 TIMES DAILY PRN
Status: DISCONTINUED | OUTPATIENT
Start: 2025-06-12 | End: 2025-06-18 | Stop reason: HOSPADM

## 2025-06-12 RX ORDER — POLYETHYLENE GLYCOL 3350 17 G/17G
17 POWDER, FOR SOLUTION ORAL DAILY PRN
Status: DISCONTINUED | OUTPATIENT
Start: 2025-06-12 | End: 2025-06-18 | Stop reason: HOSPADM

## 2025-06-12 RX ORDER — BENZONATATE 100 MG/1
100 CAPSULE ORAL 3 TIMES DAILY PRN
Status: DISCONTINUED | OUTPATIENT
Start: 2025-06-12 | End: 2025-06-18 | Stop reason: HOSPADM

## 2025-06-12 RX ORDER — BUPROPION HYDROCHLORIDE 300 MG/1
300 TABLET ORAL EVERY MORNING
Status: DISCONTINUED | OUTPATIENT
Start: 2025-06-12 | End: 2025-06-18 | Stop reason: HOSPADM

## 2025-06-12 RX ORDER — PANTOPRAZOLE SODIUM 40 MG/1
40 TABLET, DELAYED RELEASE ORAL
Status: DISCONTINUED | OUTPATIENT
Start: 2025-06-12 | End: 2025-06-14

## 2025-06-12 RX ORDER — 0.9 % SODIUM CHLORIDE 0.9 %
1000 INTRAVENOUS SOLUTION INTRAVENOUS ONCE
Status: COMPLETED | OUTPATIENT
Start: 2025-06-12 | End: 2025-06-12

## 2025-06-12 RX ORDER — OXYCODONE AND ACETAMINOPHEN 7.5; 325 MG/1; MG/1
1 TABLET ORAL EVERY 8 HOURS PRN
COMMUNITY
Start: 2025-06-03

## 2025-06-12 RX ORDER — PRAVASTATIN SODIUM 40 MG
40 TABLET ORAL NIGHTLY
Status: DISCONTINUED | OUTPATIENT
Start: 2025-06-12 | End: 2025-06-18 | Stop reason: HOSPADM

## 2025-06-12 RX ORDER — ACETAMINOPHEN 650 MG/1
650 SUPPOSITORY RECTAL EVERY 6 HOURS PRN
Status: DISCONTINUED | OUTPATIENT
Start: 2025-06-12 | End: 2025-06-18 | Stop reason: HOSPADM

## 2025-06-12 RX ORDER — FONDAPARINUX SODIUM 10 MG/.8ML
10 INJECTION SUBCUTANEOUS DAILY
Status: DISCONTINUED | OUTPATIENT
Start: 2025-06-12 | End: 2025-06-12 | Stop reason: ALTCHOICE

## 2025-06-12 RX ORDER — ACETAMINOPHEN 325 MG/1
650 TABLET ORAL EVERY 6 HOURS PRN
Status: DISCONTINUED | OUTPATIENT
Start: 2025-06-12 | End: 2025-06-18 | Stop reason: HOSPADM

## 2025-06-12 RX ORDER — VALSARTAN 80 MG/1
80 TABLET ORAL DAILY
Status: DISCONTINUED | OUTPATIENT
Start: 2025-06-12 | End: 2025-06-15

## 2025-06-12 RX ORDER — DIPHENHYDRAMINE HYDROCHLORIDE 50 MG/ML
25 INJECTION, SOLUTION INTRAMUSCULAR; INTRAVENOUS ONCE
Status: COMPLETED | OUTPATIENT
Start: 2025-06-12 | End: 2025-06-12

## 2025-06-12 RX ORDER — IOPAMIDOL 755 MG/ML
100 INJECTION, SOLUTION INTRAVASCULAR
Status: DISCONTINUED | OUTPATIENT
Start: 2025-06-12 | End: 2025-06-12

## 2025-06-12 RX ORDER — ALBUTEROL SULFATE 0.83 MG/ML
2.5 SOLUTION RESPIRATORY (INHALATION)
Status: DISCONTINUED | OUTPATIENT
Start: 2025-06-12 | End: 2025-06-18 | Stop reason: HOSPADM

## 2025-06-12 RX ORDER — DEXTROSE MONOHYDRATE 100 MG/ML
INJECTION, SOLUTION INTRAVENOUS CONTINUOUS PRN
Status: DISCONTINUED | OUTPATIENT
Start: 2025-06-12 | End: 2025-06-12 | Stop reason: SDUPTHER

## 2025-06-12 RX ORDER — INSULIN GLARGINE 100 [IU]/ML
15 INJECTION, SOLUTION SUBCUTANEOUS NIGHTLY
Status: DISCONTINUED | OUTPATIENT
Start: 2025-06-12 | End: 2025-06-15

## 2025-06-12 RX ORDER — M-VIT,TX,IRON,MINS/CALC/FOLIC 27MG-0.4MG
1 TABLET ORAL DAILY
Status: DISCONTINUED | OUTPATIENT
Start: 2025-06-12 | End: 2025-06-18 | Stop reason: HOSPADM

## 2025-06-12 RX ORDER — DICYCLOMINE HYDROCHLORIDE 10 MG/1
10 CAPSULE ORAL
Status: DISCONTINUED | OUTPATIENT
Start: 2025-06-12 | End: 2025-06-18 | Stop reason: HOSPADM

## 2025-06-12 RX ORDER — DEXTROSE MONOHYDRATE 100 MG/ML
INJECTION, SOLUTION INTRAVENOUS CONTINUOUS PRN
Status: DISCONTINUED | OUTPATIENT
Start: 2025-06-12 | End: 2025-06-18 | Stop reason: HOSPADM

## 2025-06-12 RX ORDER — PREGABALIN 50 MG/1
50 CAPSULE ORAL 3 TIMES DAILY
COMMUNITY
Start: 2025-05-20

## 2025-06-12 RX ORDER — FONDAPARINUX SODIUM 7.5 MG/.6ML
7.5 INJECTION SUBCUTANEOUS DAILY
Status: DISCONTINUED | OUTPATIENT
Start: 2025-06-12 | End: 2025-06-18 | Stop reason: HOSPADM

## 2025-06-12 RX ADMIN — METOPROLOL TARTRATE 50 MG: 50 TABLET, FILM COATED ORAL at 21:10

## 2025-06-12 RX ADMIN — FENTANYL CITRATE 50 MCG: 0.05 INJECTION, SOLUTION INTRAMUSCULAR; INTRAVENOUS at 02:35

## 2025-06-12 RX ADMIN — ONDANSETRON 4 MG: 2 INJECTION, SOLUTION INTRAMUSCULAR; INTRAVENOUS at 06:02

## 2025-06-12 RX ADMIN — Medication 1 TABLET: at 09:32

## 2025-06-12 RX ADMIN — HYDROMORPHONE HYDROCHLORIDE 0.25 MG: 1 INJECTION, SOLUTION INTRAMUSCULAR; INTRAVENOUS; SUBCUTANEOUS at 22:51

## 2025-06-12 RX ADMIN — BUPROPION HYDROCHLORIDE 300 MG: 300 TABLET, EXTENDED RELEASE ORAL at 09:32

## 2025-06-12 RX ADMIN — DICYCLOMINE HYDROCHLORIDE 10 MG: 10 CAPSULE ORAL at 09:32

## 2025-06-12 RX ADMIN — HYDROMORPHONE HYDROCHLORIDE 0.5 MG: 1 INJECTION, SOLUTION INTRAMUSCULAR; INTRAVENOUS; SUBCUTANEOUS at 06:02

## 2025-06-12 RX ADMIN — HYDROMORPHONE HYDROCHLORIDE 0.25 MG: 1 INJECTION, SOLUTION INTRAMUSCULAR; INTRAVENOUS; SUBCUTANEOUS at 09:32

## 2025-06-12 RX ADMIN — SODIUM CHLORIDE 1000 ML: 0.9 INJECTION, SOLUTION INTRAVENOUS at 06:05

## 2025-06-12 RX ADMIN — PIPERACILLIN AND TAZOBACTAM 3375 MG: 3; .375 INJECTION, POWDER, LYOPHILIZED, FOR SOLUTION INTRAVENOUS at 21:13

## 2025-06-12 RX ADMIN — ONDANSETRON 4 MG: 2 INJECTION, SOLUTION INTRAMUSCULAR; INTRAVENOUS at 02:16

## 2025-06-12 RX ADMIN — SODIUM CHLORIDE 1500 MG: 0.9 INJECTION, SOLUTION INTRAVENOUS at 18:43

## 2025-06-12 RX ADMIN — VANCOMYCIN HYDROCHLORIDE 2000 MG: 1 INJECTION, POWDER, LYOPHILIZED, FOR SOLUTION INTRAVENOUS at 07:04

## 2025-06-12 RX ADMIN — HYDROMORPHONE HYDROCHLORIDE 0.25 MG: 1 INJECTION, SOLUTION INTRAMUSCULAR; INTRAVENOUS; SUBCUTANEOUS at 03:36

## 2025-06-12 RX ADMIN — PIPERACILLIN AND TAZOBACTAM 4500 MG: 4; .5 INJECTION, POWDER, LYOPHILIZED, FOR SOLUTION INTRAVENOUS at 06:32

## 2025-06-12 RX ADMIN — DEXTROSE 125 ML: 10 SOLUTION INTRAVENOUS at 09:32

## 2025-06-12 RX ADMIN — FLUOXETINE HYDROCHLORIDE 40 MG: 20 CAPSULE ORAL at 09:32

## 2025-06-12 RX ADMIN — PRAVASTATIN SODIUM 40 MG: 40 TABLET ORAL at 21:10

## 2025-06-12 RX ADMIN — HYDROMORPHONE HYDROCHLORIDE 0.25 MG: 1 INJECTION, SOLUTION INTRAMUSCULAR; INTRAVENOUS; SUBCUTANEOUS at 17:58

## 2025-06-12 RX ADMIN — DIPHENHYDRAMINE HYDROCHLORIDE 25 MG: 50 INJECTION INTRAMUSCULAR; INTRAVENOUS at 21:10

## 2025-06-12 RX ADMIN — PIPERACILLIN AND TAZOBACTAM 3375 MG: 3; .375 INJECTION, POWDER, LYOPHILIZED, FOR SOLUTION INTRAVENOUS at 13:41

## 2025-06-12 RX ADMIN — SUCRALFATE 1 G: 1 TABLET ORAL at 21:10

## 2025-06-12 RX ADMIN — SODIUM CHLORIDE 1000 ML: 0.9 INJECTION, SOLUTION INTRAVENOUS at 02:00

## 2025-06-12 RX ADMIN — HYDROMORPHONE HYDROCHLORIDE 0.25 MG: 1 INJECTION, SOLUTION INTRAMUSCULAR; INTRAVENOUS; SUBCUTANEOUS at 13:34

## 2025-06-12 ASSESSMENT — PAIN SCALES - GENERAL
PAINLEVEL_OUTOF10: 6
PAINLEVEL_OUTOF10: 7
PAINLEVEL_OUTOF10: 6
PAINLEVEL_OUTOF10: 5
PAINLEVEL_OUTOF10: 8

## 2025-06-12 ASSESSMENT — PAIN DESCRIPTION - LOCATION
LOCATION: HIP
LOCATION: HIP;OTHER (COMMENT)
LOCATION: HIP
LOCATION: HIP

## 2025-06-12 ASSESSMENT — PAIN DESCRIPTION - DESCRIPTORS
DESCRIPTORS: SHARP;BURNING
DESCRIPTORS: BURNING;SHARP
DESCRIPTORS: SHARP;BURNING

## 2025-06-12 ASSESSMENT — PAIN DESCRIPTION - PAIN TYPE: TYPE: CHRONIC PAIN

## 2025-06-12 ASSESSMENT — PAIN DESCRIPTION - ORIENTATION
ORIENTATION: LEFT
ORIENTATION: LEFT
ORIENTATION: RIGHT;LEFT
ORIENTATION: LEFT

## 2025-06-12 ASSESSMENT — PAIN - FUNCTIONAL ASSESSMENT
PAIN_FUNCTIONAL_ASSESSMENT: 0-10
PAIN_FUNCTIONAL_ASSESSMENT: PREVENTS OR INTERFERES WITH MANY ACTIVE NOT PASSIVE ACTIVITIES

## 2025-06-12 NOTE — CARE COORDINATION
Case Management Assessment  Initial Evaluation    Date/Time of Evaluation: 6/12/2025 9:01 AM  Assessment Completed by: Jaqueline Matias RN    If patient is discharged prior to next notation, then this note serves as note for discharge by case management.    Patient Name: Krista Chiang                   YOB: 1980  Diagnosis: Hypoglycemia [E16.2]  Community acquired bacterial pneumonia [J15.9]  Pneumonia due to infectious organism, unspecified laterality, unspecified part of lung [J18.9]                   Date / Time: 6/12/2025  1:23 AM    Patient Admission Status: Inpatient   Readmission Risk (Low < 19, Mod (19-27), High > 27): Readmission Risk Score: 38.5    Current PCP: Arielle Melton, BERNARDA - NP  PCP verified by CM? Yes    Chart Reviewed: Yes      History Provided by: Patient  Patient Orientation: Alert and Oriented    Patient Cognition: Alert    Hospitalization in the last 30 days (Readmission):  No    If yes, Readmission Assessment in  Navigator will be completed.    Advance Directives:      Code Status: Full Code   Patient's Primary Decision Maker is: Legal Next of Kin    Primary Decision Maker: Naima Grant - Parent - [phone]    Secondary Decision Maker: JuanitaZachery - Brother/Sister - [phone]    Discharge Planning:    Patient lives with: Alone Type of Home: House  Primary Care Giver: Self  Patient Support Systems include: Children, Family Members   Current Financial resources: Medicaid  Current community resources: ECF/Home Care  Current services prior to admission: Durable Medical Equipment, Home Care            Current DME: Walker, Wheelchair, Bedside Commode, Glucometer, Shower Chair            Type of Home Care services:  Nursing Services    ADLS  Prior functional level: Assistance with the following:, Mobility, Shopping, Housework  Current functional level: Assistance with the following:, Housework, Shopping, Mobility    PT AM-PAC:   /24  OT AM-PAC:   /24    Family can

## 2025-06-12 NOTE — ED PROVIDER NOTES
EMERGENCY DEPARTMENT ENCOUNTER    Pt Name: Krista Chiang  MRN: [id number]  Birthdate 1980  Date of evaluation: 6/12/25  CHIEF COMPLAINT       Chief Complaint   Patient presents with    Hypoglycemia     HISTORY OF PRESENT ILLNESS   This is a 44-year-old woman she has a history of multiple comorbidities, hypertension, diabetes, previous hip issues    She is presenting with low blood sugar    She states she has not been feeling well her blood sugars been running low all day    She took her normal 14 units of long-acting Lantus this morning    Has not been eating much so only took some sliding scale short acting earlier in the morning    May have had a fall around 11 and hurt her left hip no other injuries    Has not been having much of an appetite nauseous    Denies any headache neck pain chest pain abdominal pain vomiting constipation diarrhea    Patient was altered when EMS arrived reportedly had a blood sugar of 20 was given glucose and has improved now              REVIEW OF SYSTEMS     Review of Systems   Constitutional:  Negative for chills and fever.   HENT:  Negative for rhinorrhea and sore throat.    Eyes:  Negative for discharge and redness.   Respiratory:  Negative for shortness of breath.    Cardiovascular:  Negative for chest pain.   Gastrointestinal:  Negative for abdominal pain, diarrhea, nausea and vomiting.   Genitourinary:  Negative for dysuria, frequency and urgency.   Musculoskeletal:  Negative for arthralgias and myalgias.        Left hip pain   Skin:  Negative for rash.   Neurological:  Negative for weakness and numbness.   Psychiatric/Behavioral:  Negative for suicidal ideas.    All other systems reviewed and are negative.    PASTMEDICAL HISTORY     Past Medical History:   Diagnosis Date    Alcohol abuse     Recurrent episodes of withdrawal    Alcoholic hepatitis without ascites (HCC)     Antiphospholipid syndrome     hypercoagulable state    Anxiety 2013    Arthritis 2013    Painting  F10.930    Opioid overdose ([facility]) T40.2X1A    Severe hypoxic-ischemic encephalopathy ([facility]) P91.63    Cognitive impairment R41.89    SAH (subarachnoid hemorrhage) ([facility]) I60.9    Frequent falls R29.6    Syncope and collapse R55    Metabolic acidosis, increased anion gap E87.29    Bipolar depression ([facility]) F31.9    Closed fracture of left hip ([facility]) S72.002A    Abscess of left hip L02.416    Surgical wound infection T81.49XA    Staph aureus infection A49.01    Anemia D64.9    Hyponatremia E87.1    Surgical site infection T81.49XA    Post-op pain G89.18    Bipolar 1 disorder ([facility]) F31.9    Postoperative wound infection of left hip T81.49XA    S/P total left hip arthroplasty Z96.642    Prosthetic joint infection T84.50XA    MSSA (methicillin susceptible Staphylococcus aureus) infection A49.01    Prosthetic hip infection, initial encounter T84.59XA, Z96.649    Left hip pain M25.552    Wound dehiscence T81.30XA    Prosthetic hip infection T84.59XA, Z96.649    Postoperative infection T81.40XA    Chronic osteomyelitis of left femur with draining sinus ([facility]) M86.452    Hardware complicating wound infection T84.7XXA    Methicillin susceptible Staphylococcus aureus infection A49.01    Recurrent ventral incisional hernia K43.2    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    Pleural effusion J90    Morbid obesity ([facility]) E66.01    Migraine G43.909    Cerebrovascular accident (CVA) ([facility]) I63.9    Vitamin B12 deficiency anemia due to intrinsic factor deficiency D51.0    Presence of other vascular implants and grafts Z95.828    Pure hyperglyceridemia E78.1    Personal history of urinary (tract) infections Z87.440    Personal history of pneumonia (recurrent) Z87.01    Personal history of PE (pulmonary embolism) Z86.711    Personal history of other venous thrombosis and embolism Z86.718    Personal history of Methicillin resistant Staphylococcus aureus infection Z86.14    Other primary thrombophilia D68.59    Iron deficiency anemia

## 2025-06-12 NOTE — DISCHARGE INSTR - COC
Continuity of Care Form    Patient Name: Krista Chiang   :  1980  MRN:  [id number]    Admit date:  2025  Discharge date:  2025    Code Status Order: Full Code   Advance Directives:     Admitting Physician:  Wilner Ash MD  PCP: Arielle Melton, APRN - NP    Discharging Nurse: Mandy PEREZ RN   Discharging Hospital Unit/Room#: 2074/2074-01  Discharging Unit Phone Number: [phone]    Emergency Contact:   Extended Emergency Contact Information  Primary Emergency Contact: Naima Grant  Address: [address] [address]  Home Phone: [phone]  Mobile Phone: [phone]  Relation: Parent  Secondary Emergency Contact: Zachery Grant  Home Phone: [phone]  Mobile Phone: [phone]  Relation: Brother/Sister    Past Surgical History:  Past Surgical History:   Procedure Laterality Date    ABDOMINAL HERNIA REPAIR      incisional hernia repair, complicated by infection, had multiple surgeries for this    ABDOMINAL HERNIA REPAIR  2014    ABLATION OF DYSRHYTHMIC FOCUS  2025    ANKLE FRACTURE SURGERY Left     2023  HIP IRRIGATION AND DEBRIDEMENT AND PLACEMENT OF ANTIBIOTIC IMPREGNATED CEMENT BEADS performed by Dipak Traore DO at [facility] OR    ANKLE FRACTURE SURGERY Left 10/24/2023    IRRIGATION AND DEBRIDEMENT OF LEFT HIP WITH CLOSURE performed by Dipak Traore DO at [facility] OR    ANKLE SURGERY Left 2019    ligament    ANKLE SURGERY Left 2023    IRRIGATION AND DEBRIDEMENT HIP (IRRISEPT, CELLERATE) performed by Dipak Traore DO at [facility] OR    BARIATRIC SURGERY  2013    [facility] : [facility]     SECTION      CHOLECYSTECTOMY      DILATION AND CURETTAGE OF UTERUS      EP DEVICE PROCEDURE N/A 2025    juan junior / Ablation SVT / no anes / julia performed by Sherman Rucker MD at [facility] CARDIAC CATH LAB    ESOPHAGOGASTRODUODENOSCOPY  2021    ESOPHAGOGASTRODUODENOSCOPY

## 2025-06-12 NOTE — PROGRESS NOTES
[facility]   Pharmacy Pharmacokinetic Monitoring Service - Vancomycin     Krista Chiang is a 44 y.o. female starting on vancomycin therapy for CAP. Pharmacy consulted by Daphnie Patrick APRN - NP  for monitoring and adjustment.    Target Concentration: Goal AUC/JAVED 400-600 mg*hr/L    Additional Antimicrobials: zosyn    Pertinent Laboratory Values:   Wt Readings from Last 1 Encounters:   06/12/25 81.6 kg (180 lb)     Temp Readings from Last 1 Encounters:   06/12/25 98.2 °F (36.8 °C) (Oral)     Estimated Creatinine Clearance: 114 mL/min (based on SCr of 0.7 mg/dL).  Recent Labs     06/12/25  0200   CREATININE 0.7   BUN 16   WBC 10.1       Pertinent Cultures: see micro     MRSA Nasal Swab: not ordered. Order placed by pharmacy.    Plan:  Dosing recommendations based on Bayesian software  Start vancomycin 2000 mg IVPB x 1 dose loading, then continue with vancomycin 1500 mg IVPB Q12H for maintenance dosing  Anticipated AUC of 566 and trough concentration of 13.1 at steady state  Renal labs as indicated   Vancomycin concentration ordered for 06/14 @ 0600   Pharmacy will continue to monitor patient and adjust therapy as indicated    Loading dose: N/A  Regimen: 1500 mg IV every 12 hours.  Start time: 19:04 on 06/12/2025  Exposure target: AUC24 (range) 400-600 mg/L.hr   ISB23-38: 566 mg/L.hr  AUC24,ss: 598 mg/L.hr  Probability of AUC24 > 400: 90 %  Ctrough,ss: 13.1 mg/L  Probability of Ctrough,ss > 20: 14 %      Thank you for the consult,  Roma Belle RPH  6/12/2025 7:54 AM

## 2025-06-12 NOTE — H&P
[facility] Internal Medicine  Raul Heard MD; Ezio Conway MD, Crystal Estrada MD,    Wilner Ash MD, Carli Mendoza MD.    [facility] Internal Medicine   IN-PATIENT SERVICE   [facility]    HISTORY AND PHYSICAL EXAMINATION            Date:   6/12/2025  Patient name:  Krista Chiang  Date of admission:  6/12/2025  1:23 AM  MRN:   [id number]  Account:  [id number]  YOB: 1980  PCP:    Arielle Metlon APRN - NP  Room:   2074/2074-01  Code Status:    Full Code    Chief Complaint:     Chief Complaint   Patient presents with    Hypoglycemia       History Obtained From:     Patient/EMR/Bedside RN    History of Present Illness:     Krista Chiang is a 44 y.o. Non- / non  female who presents with Hypoglycemia   and is admitted to the hospital for the management of Community acquired bacterial pneumonia.      Patient presented to ED per EMS for low blood sugar with glucose level 20.  Apparently she was awake enough to eat a few bites of food and drink which brought her blood sugar up to 87.  On arrival to ED she reports that she has been having increased nausea and vomiting.  States that she has not had much of an appetite but still took took 14 units of Lantus and a partial dose of sliding scale earlier in the morning.  Also reports that she had a fall around 11 AM at home.  States that she has increased left hip pain.  She is on anticoagulation for antiphospholipid syndrome.  CT imaging shows no acute fracture or injuries.  CT chest abdomen pelvis reveals right lower lobe pneumonia.  Started on IV vancomycin and IV Zosyn.  Hgb 9.7 which is patient's baseline.  Urinalysis negative for infection.     Being admitted to MedSurg unit for pneumonia and hypoglycemia.    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 encounter     left    Constipation 09/03/2019    Depression 07/12/2015    Fibromyalgia     Genital herpes, unspecified     GERD (gastroesophageal reflux disease) 2021    H/O bariatric surgery 2013    Awa and Y    History of pulmonary embolism     Hx of blood clots     clots in both legs and lungs     Hypertension 2012    Lives in nursing home 08/26/2023    [facility] :  # [phone] , fax # [phone]    Lumbosacral spondylosis without myelopathy     MDRO (multiple drug resistant organisms) resistance 2010    MRSA (abd)    Mobility impaired     wheelchair, uses a walker and pivots on right foot    MRSA (methicillin resistant staph aureus) culture positive 02/10/2017    urine    Muscle weakness     Obsessive-compulsive disorder, unspecified     Opioid abuse, in remission (HCC)     Pernicious anemia     Polyneuropathy, unspecified     PTSD (post-traumatic stress disorder)     Pulmonary embolism (HCC) 2010    Suicidal behavior 12/14/2015    many attempts    SVT (supraventricular tachycardia) 04/07/2017    Type 2 diabetes mellitus, with long-term current use of insulin (HCC) 2013    Under care of team     ID, Dr. Jorge, last seen 10/26/2023 while in hospital    Under care of team     Ortho, Dr. Traore, last seen by resident in ER 10/30/2023        Past Surgical History:     Past Surgical History:   Procedure Laterality Date    ABDOMINAL HERNIA REPAIR  2012    incisional hernia repair, complicated by infection, had multiple surgeries for this    ABDOMINAL HERNIA REPAIR  11/03/2014    ABLATION OF DYSRHYTHMIC FOCUS  05/12/2025    ANKLE FRACTURE SURGERY Left     8/21/2023  HIP IRRIGATION AND DEBRIDEMENT AND PLACEMENT OF ANTIBIOTIC IMPREGNATED CEMENT BEADS performed by

## 2025-06-12 NOTE — PROGRESS NOTES
[facility] Internal Medicine  Raul Heard MD; Ezio Conway MD; Wilner Ash MD;  Crystal Estrada MD; Carli Mendoza MD  [facility] Internal Medicine   IN-PATIENT SERVICE  [facility]                 Date:   6/12/2025  Patientname:  Krista Chiang  Date of admission:  6/12/2025  1:23 AM  MRN:   [id number]  Account:  [id number]  YOB: 1980  PCP:    Arielle Melton APRN - NP  Room:   08/08  Code Status:    Full Code      Chief Complaint:     Chief Complaint   Patient presents with    Hypoglycemia       History of Present Illness:     Krista Chiang is a 44 y.o. Non- / non  female who presents with Hypoglycemia   and is admitted to the hospital for the management of Community acquired bacterial pneumonia.     Patient presented to ED per EMS for low blood sugar with glucose level 20.  Apparently she was awake enough to eat a few bites of food and drink which brought her blood sugar up to 87.  On arrival to ED she reports that she has been having increased nausea and vomiting.  States that she has not had much of an appetite but still took took 14 units of Lantus and a partial dose of sliding scale earlier in the morning.  Also reports that she had a fall around 11 AM at home.  States that she has increased left hip pain.  She is on anticoagulation for antiphospholipid syndrome.  CT imaging shows no acute fracture or injuries.  CT chest abdomen pelvis reveals right lower lobe pneumonia.  Started on IV vancomycin and IV Zosyn.  Hgb 9.7 which is patient's baseline.  Urinalysis negative for infection.    Being admitted to MedSurg unit for pneumonia and hypoglycemia.      BERNARDA JOVEL NP  6/12/2025  6:57 AM      Please note that this chart was generated using voice recognition Dragon dictation software.  Although every effort was made to ensure the accuracy of this automated transcription, some errors in transcription may have

## 2025-06-12 NOTE — CONSULTS
Comprehensive Nutrition Assessment    Type and Reason for Visit:  Consult, Positive nutrition screen    Nutrition Recommendations/Plan:   Continue current diet.   Continue Ensure Clear with all meals.      Malnutrition Assessment:  Malnutrition Status:  Moderate malnutrition (06/12/25 1047)    Context:  Acute Illness     Findings of the 6 clinical characteristics of malnutrition:  Energy Intake:  Mild decrease in energy intake (due to emesis.)  Weight Loss:  Mild weight loss (36% wt loss in~ 10mon.)     Body Fat Loss:  No body fat loss     Muscle Mass Loss:  Mild muscle mass loss Temples (temporalis)  Fluid Accumulation:  No fluid accumulation     Strength:  Not Performed    Nutrition Assessment:    Patient admitted on 6/12 for the management of community acquired bacterial pneumonia. Patient states that her appetite prior to admission was poor. She says she never feels hungry and has been vomiting more frequently. She states her appetite now is still poor but she consumes 100% of the provided nutrition supplements. Patient states she drinks Boost at home and does double portions of protein. Pt reports a usual weight between 270-300lbs. She says she had a gastric bypass and pancreatitis, so she noticed a 140lb weight loss in the past 10 months. Reviewed chart and noted a 35lb weight loss in 10 months. Pt does report nausea and vomiting. Patient history includes diabetes, pancreatitis, Painting's esophagus, alcoholic hepatitis and a gastric bypass.    Nutrition Related Findings:    Edema: none. Labs reviewed. Meds: Humalog (sliding scale), Carafate. No note of BM upon chart review. Noted nausea 6/12. Wound Type: None       Current Nutrition Intake & Therapies:    Average Meal Intake: 0%  Average Supplements Intake: %  ADULT DIET; Clear Liquid  ADULT ORAL NUTRITION SUPPLEMENT; Breakfast, Lunch, Dinner; Clear Liquid Oral Supplement    Anthropometric Measures:  Height: 172.7 cm (5' 7.99\")  Ideal Body Weight

## 2025-06-12 NOTE — ED NOTES
Report given to PEBBLES Bingham from [facility].   Report method by phone   The following was reviewed with receiving RN:   Current vital signs:  /67   Pulse 76   Temp 98.2 °F (36.8 °C) (Oral)   Resp 25   Ht 1.727 m (5' 8\")   Wt 81.6 kg (180 lb)   SpO2 100%   BMI 27.37 kg/m²                      Any medication or safety alerts were reviewed. Any pending diagnostics and notifications were also reviewed, as well as any safety concerns or issues, abnormal labs, abnormal imaging, and abnormal assessment findings. Questions were answered.

## 2025-06-13 PROBLEM — E16.2 HYPOGLYCEMIA: Status: ACTIVE | Noted: 2025-06-13

## 2025-06-13 LAB
ANION GAP SERPL CALCULATED.3IONS-SCNC: 11 MMOL/L (ref 9–16)
BASOPHILS # BLD: 0.05 K/UL (ref 0–0.2)
BASOPHILS NFR BLD: 1 % (ref 0–2)
BUN SERPL-MCNC: 5 MG/DL (ref 6–20)
CALCIUM SERPL-MCNC: 8.3 MG/DL (ref 8.6–10.4)
CHLORIDE SERPL-SCNC: 103 MMOL/L (ref 98–107)
CO2 SERPL-SCNC: 20 MMOL/L (ref 20–31)
CREAT SERPL-MCNC: 0.6 MG/DL (ref 0.7–1.2)
CRP SERPL HS-MCNC: <3 MG/L (ref 0–5)
EKG ATRIAL RATE: 52 BPM
EKG P AXIS: 73 DEGREES
EKG P-R INTERVAL: 156 MS
EKG Q-T INTERVAL: 534 MS
EKG QRS DURATION: 110 MS
EKG QTC CALCULATION (BAZETT): 496 MS
EKG R AXIS: 78 DEGREES
EKG T AXIS: 67 DEGREES
EKG VENTRICULAR RATE: 52 BPM
EOSINOPHIL # BLD: 0.1 K/UL (ref 0–0.44)
EOSINOPHILS RELATIVE PERCENT: 2 % (ref 0–4)
ERYTHROCYTE [DISTWIDTH] IN BLOOD BY AUTOMATED COUNT: 15.2 % (ref 11.5–14.9)
GFR, ESTIMATED: >90 ML/MIN/1.73M2
GLUCOSE BLD-MCNC: 104 MG/DL (ref 65–105)
GLUCOSE BLD-MCNC: 127 MG/DL (ref 65–105)
GLUCOSE BLD-MCNC: 149 MG/DL (ref 65–105)
GLUCOSE BLD-MCNC: 281 MG/DL (ref 65–105)
GLUCOSE BLD-MCNC: 354 MG/DL (ref 65–105)
GLUCOSE SERPL-MCNC: 505 MG/DL (ref 74–99)
HCT VFR BLD AUTO: 35.3 % (ref 36–46)
HGB BLD-MCNC: 10.7 G/DL (ref 12–16)
IMM GRANULOCYTES # BLD AUTO: <0.03 K/UL (ref 0–0.3)
IMM GRANULOCYTES NFR BLD: 0 %
LYMPHOCYTES NFR BLD: 1.43 K/UL (ref 1.1–3.7)
LYMPHOCYTES RELATIVE PERCENT: 29 % (ref 24–44)
MAGNESIUM SERPL-MCNC: 1.8 MG/DL (ref 1.6–2.6)
MCH RBC QN AUTO: 28.5 PG (ref 26–34)
MCHC RBC AUTO-ENTMCNC: 30.3 G/DL (ref 31–37)
MCV RBC AUTO: 94.1 FL (ref 80–100)
MONOCYTES NFR BLD: 0.4 K/UL (ref 0.1–1.2)
MONOCYTES NFR BLD: 8 % (ref 3–12)
MRSA, DNA, NASAL: NEGATIVE
NEUTROPHILS NFR BLD: 60 % (ref 36–66)
NEUTS SEG NFR BLD: 2.94 K/UL (ref 1.5–8.1)
NRBC BLD-RTO: 0 PER 100 WBC
PLATELET # BLD AUTO: 419 K/UL (ref 150–450)
PMV BLD AUTO: 11.2 FL (ref 8–13.5)
POTASSIUM SERPL-SCNC: 4.9 MMOL/L (ref 3.7–5.3)
PROCALCITONIN SERPL-MCNC: 0.17 NG/ML (ref 0–0.09)
RBC # BLD AUTO: 3.75 M/UL (ref 3.95–5.11)
SODIUM SERPL-SCNC: 134 MMOL/L (ref 136–145)
SPECIMEN DESCRIPTION: NORMAL
WBC OTHER # BLD: 4.9 K/UL (ref 3.5–11)

## 2025-06-13 PROCEDURE — 99254 IP/OBS CNSLTJ NEW/EST MOD 60: CPT | Performed by: INTERNAL MEDICINE

## 2025-06-13 PROCEDURE — 87899 AGENT NOS ASSAY W/OPTIC: CPT

## 2025-06-13 PROCEDURE — 99255 IP/OBS CONSLTJ NEW/EST HI 80: CPT | Performed by: INTERNAL MEDICINE

## 2025-06-13 PROCEDURE — 93010 ELECTROCARDIOGRAM REPORT: CPT | Performed by: INTERNAL MEDICINE

## 2025-06-13 PROCEDURE — 97162 PT EVAL MOD COMPLEX 30 MIN: CPT

## 2025-06-13 PROCEDURE — 80048 BASIC METABOLIC PNL TOTAL CA: CPT

## 2025-06-13 PROCEDURE — 97535 SELF CARE MNGMENT TRAINING: CPT

## 2025-06-13 PROCEDURE — 82947 ASSAY GLUCOSE BLOOD QUANT: CPT

## 2025-06-13 PROCEDURE — 87449 NOS EACH ORGANISM AG IA: CPT

## 2025-06-13 PROCEDURE — 84145 PROCALCITONIN (PCT): CPT

## 2025-06-13 PROCEDURE — 6370000000 HC RX 637 (ALT 250 FOR IP): Performed by: INTERNAL MEDICINE

## 2025-06-13 PROCEDURE — 2500000003 HC RX 250 WO HCPCS: Performed by: NURSE PRACTITIONER

## 2025-06-13 PROCEDURE — 86738 MYCOPLASMA ANTIBODY: CPT

## 2025-06-13 PROCEDURE — 6360000002 HC RX W HCPCS: Performed by: NURSE PRACTITIONER

## 2025-06-13 PROCEDURE — 2580000003 HC RX 258: Performed by: NURSE PRACTITIONER

## 2025-06-13 PROCEDURE — 1200000000 HC SEMI PRIVATE

## 2025-06-13 PROCEDURE — 85025 COMPLETE CBC W/AUTO DIFF WBC: CPT

## 2025-06-13 PROCEDURE — 36415 COLL VENOUS BLD VENIPUNCTURE: CPT

## 2025-06-13 PROCEDURE — APPNB60 APP NON BILLABLE TIME 46-60 MINS: Performed by: NURSE PRACTITIONER

## 2025-06-13 PROCEDURE — 83735 ASSAY OF MAGNESIUM: CPT

## 2025-06-13 PROCEDURE — 82533 TOTAL CORTISOL: CPT

## 2025-06-13 PROCEDURE — 97530 THERAPEUTIC ACTIVITIES: CPT

## 2025-06-13 PROCEDURE — 86140 C-REACTIVE PROTEIN: CPT

## 2025-06-13 PROCEDURE — 99233 SBSQ HOSP IP/OBS HIGH 50: CPT | Performed by: INTERNAL MEDICINE

## 2025-06-13 PROCEDURE — 6370000000 HC RX 637 (ALT 250 FOR IP): Performed by: NURSE PRACTITIONER

## 2025-06-13 PROCEDURE — 97166 OT EVAL MOD COMPLEX 45 MIN: CPT

## 2025-06-13 RX ORDER — LEVOFLOXACIN 500 MG/1
750 TABLET, FILM COATED ORAL EVERY 24 HOURS
Status: DISPENSED | OUTPATIENT
Start: 2025-06-13 | End: 2025-06-18

## 2025-06-13 RX ORDER — OXYCODONE AND ACETAMINOPHEN 7.5; 325 MG/1; MG/1
1 TABLET ORAL EVERY 8 HOURS PRN
Refills: 0 | Status: DISCONTINUED | OUTPATIENT
Start: 2025-06-13 | End: 2025-06-13 | Stop reason: SDUPTHER

## 2025-06-13 RX ORDER — OXYCODONE HYDROCHLORIDE 5 MG/1
2.5 TABLET ORAL EVERY 4 HOURS PRN
Refills: 0 | Status: DISCONTINUED | OUTPATIENT
Start: 2025-06-13 | End: 2025-06-18 | Stop reason: HOSPADM

## 2025-06-13 RX ORDER — OXYCODONE AND ACETAMINOPHEN 5; 325 MG/1; MG/1
1 TABLET ORAL EVERY 4 HOURS PRN
Refills: 0 | Status: DISCONTINUED | OUTPATIENT
Start: 2025-06-13 | End: 2025-06-18 | Stop reason: HOSPADM

## 2025-06-13 RX ADMIN — PANCRELIPASE LIPASE, PANCRELIPASE PROTEASE, PANCRELIPASE AMYLASE 40000 UNITS: 20000; 63000; 84000 CAPSULE, DELAYED RELEASE ORAL at 11:59

## 2025-06-13 RX ADMIN — VALSARTAN 80 MG: 80 TABLET, FILM COATED ORAL at 08:04

## 2025-06-13 RX ADMIN — SUCRALFATE 1 G: 1 TABLET ORAL at 15:27

## 2025-06-13 RX ADMIN — OXYCODONE HYDROCHLORIDE AND ACETAMINOPHEN 1 TABLET: 5; 325 TABLET ORAL at 16:08

## 2025-06-13 RX ADMIN — BUPROPION HYDROCHLORIDE 300 MG: 300 TABLET, EXTENDED RELEASE ORAL at 08:06

## 2025-06-13 RX ADMIN — OXYCODONE HYDROCHLORIDE AND ACETAMINOPHEN 1 TABLET: 5; 325 TABLET ORAL at 20:49

## 2025-06-13 RX ADMIN — PRAVASTATIN SODIUM 40 MG: 40 TABLET ORAL at 20:49

## 2025-06-13 RX ADMIN — HYDROMORPHONE HYDROCHLORIDE 0.25 MG: 1 INJECTION, SOLUTION INTRAMUSCULAR; INTRAVENOUS; SUBCUTANEOUS at 08:07

## 2025-06-13 RX ADMIN — INSULIN LISPRO 8 UNITS: 100 INJECTION, SOLUTION INTRAVENOUS; SUBCUTANEOUS at 17:34

## 2025-06-13 RX ADMIN — Medication 1 TABLET: at 08:06

## 2025-06-13 RX ADMIN — HYDROMORPHONE HYDROCHLORIDE 0.25 MG: 1 INJECTION, SOLUTION INTRAMUSCULAR; INTRAVENOUS; SUBCUTANEOUS at 03:20

## 2025-06-13 RX ADMIN — OXYCODONE HYDROCHLORIDE AND ACETAMINOPHEN 1 TABLET: 5; 325 TABLET ORAL at 11:58

## 2025-06-13 RX ADMIN — SUCRALFATE 1 G: 1 TABLET ORAL at 11:58

## 2025-06-13 RX ADMIN — LEVOFLOXACIN 750 MG: 500 TABLET, FILM COATED ORAL at 10:49

## 2025-06-13 RX ADMIN — SODIUM CHLORIDE, PRESERVATIVE FREE 10 ML: 5 INJECTION INTRAVENOUS at 08:09

## 2025-06-13 RX ADMIN — DICYCLOMINE HYDROCHLORIDE 10 MG: 10 CAPSULE ORAL at 15:26

## 2025-06-13 RX ADMIN — SUCRALFATE 1 G: 1 TABLET ORAL at 08:06

## 2025-06-13 RX ADMIN — OXYCODONE HYDROCHLORIDE 2.5 MG: 5 TABLET ORAL at 16:08

## 2025-06-13 RX ADMIN — PIPERACILLIN AND TAZOBACTAM 3375 MG: 3; .375 INJECTION, POWDER, LYOPHILIZED, FOR SOLUTION INTRAVENOUS at 03:22

## 2025-06-13 RX ADMIN — PANCRELIPASE LIPASE, PANCRELIPASE PROTEASE, PANCRELIPASE AMYLASE 40000 UNITS: 20000; 63000; 84000 CAPSULE, DELAYED RELEASE ORAL at 17:36

## 2025-06-13 RX ADMIN — PANTOPRAZOLE SODIUM 40 MG: 40 TABLET, DELAYED RELEASE ORAL at 06:01

## 2025-06-13 RX ADMIN — OXYCODONE HYDROCHLORIDE 2.5 MG: 5 TABLET ORAL at 20:49

## 2025-06-13 RX ADMIN — SODIUM CHLORIDE 1500 MG: 0.9 INJECTION, SOLUTION INTRAVENOUS at 06:03

## 2025-06-13 RX ADMIN — DICYCLOMINE HYDROCHLORIDE 10 MG: 10 CAPSULE ORAL at 06:01

## 2025-06-13 RX ADMIN — FONDAPARINUX SODIUM 7.5 MG: 7.5 INJECTION, SOLUTION SUBCUTANEOUS at 08:02

## 2025-06-13 RX ADMIN — SODIUM CHLORIDE, PRESERVATIVE FREE 10 ML: 5 INJECTION INTRAVENOUS at 20:49

## 2025-06-13 RX ADMIN — OXYCODONE HYDROCHLORIDE 2.5 MG: 5 TABLET ORAL at 11:58

## 2025-06-13 RX ADMIN — FLUOXETINE HYDROCHLORIDE 40 MG: 20 CAPSULE ORAL at 08:07

## 2025-06-13 RX ADMIN — PANCRELIPASE LIPASE, PANCRELIPASE PROTEASE, PANCRELIPASE AMYLASE 40000 UNITS: 20000; 63000; 84000 CAPSULE, DELAYED RELEASE ORAL at 08:04

## 2025-06-13 RX ADMIN — PANTOPRAZOLE SODIUM 40 MG: 40 TABLET, DELAYED RELEASE ORAL at 15:26

## 2025-06-13 RX ADMIN — SUCRALFATE 1 G: 1 TABLET ORAL at 20:49

## 2025-06-13 ASSESSMENT — PAIN SCALES - GENERAL
PAINLEVEL_OUTOF10: 5
PAINLEVEL_OUTOF10: 7
PAINLEVEL_OUTOF10: 7
PAINLEVEL_OUTOF10: 6
PAINLEVEL_OUTOF10: 7
PAINLEVEL_OUTOF10: 5
PAINLEVEL_OUTOF10: 6
PAINLEVEL_OUTOF10: 7

## 2025-06-13 ASSESSMENT — PAIN DESCRIPTION - ORIENTATION
ORIENTATION: LEFT
ORIENTATION: RIGHT

## 2025-06-13 ASSESSMENT — PAIN DESCRIPTION - LOCATION
LOCATION: HIP
LOCATION: HIP;OTHER (COMMENT)

## 2025-06-13 ASSESSMENT — PAIN SCALES - WONG BAKER
WONGBAKER_NUMERICALRESPONSE: HURTS A LITTLE BIT
WONGBAKER_NUMERICALRESPONSE: HURTS A LITTLE BIT

## 2025-06-13 ASSESSMENT — PAIN DESCRIPTION - DESCRIPTORS
DESCRIPTORS: SHARP
DESCRIPTORS: ACHING;BURNING
DESCRIPTORS: SHARP;BURNING
DESCRIPTORS: BURNING;SHARP
DESCRIPTORS: ACHING;BURNING;SHARP
DESCRIPTORS: ACHING;SHARP

## 2025-06-13 NOTE — CONSULTS
Gastroenterology Consult Note    Patient:   Krista Chiang   Admit date:  6/12/2025  Facility:   [facility]  Referring/PCP: Arielle Melton, BERNARDA - NP  Date:     6/13/2025  Consultant:   Andrew Diane, BERNARDA - NP, Oswaldo Escobedo MD    Subjective:     This 45 y.o. female was admitted 6/12/2025 with a diagnosis of \"Hypoglycemia [E16.2]  Community acquired bacterial pneumonia [J15.9]  Pneumonia due to infectious organism, unspecified laterality, unspecified part of lung [J18.9]\" and is seen in consultation regarding   Chief Complaint   Patient presents with    Hypoglycemia     45/F w/ pmhx of alcohol abuse, Aaw-en-Y gastric bypass, bipolar, anxiety, DMT2, blood clots on arixtra, antiphospholipid syndrome, fatty liver, PTSD, depression, pernicious anemia, recurrent UTI, chronic left hip wound, hx of C.diff colitis, chronic EBV infection, suicidal behavior presents to ED per EMS for hypoglycemia, fall.  Glucose 20 on EMS arrival.  Was able to eat/drink per EMS and her sugar improved to 87.  Patient reports increased nausea, vomiting the last several weeks.  Though she had decreased oral intake she still took her lantus and partial sliding scale.  On admission labs stable.  CT head negative.  CT hip showed large fluid collection, possible infection; patient has followed with ortho at [facility].  CT chest/abd/pelvis w/ no acute intra-abdominal or pelvic process.    At time of evaluation patient appears comfortable.  No reported vomiting since arrival.  Patient has been admitted several times over the past year for nausea, vomiting.      Endoscopic History     -No colonoscopy     -EGD with dilation 3/9/25  Retropharyngeal area was grossly normal appearing     Esophagus: Normal upper and middle esophagus  Lower esophagus showed small amount of yellow exudate-biopsy obtained rule out Candida     History of gastric bypass  2 cm clean-based ulcer immediately after the anastomosis without evidence of  typographical errors. Please contact our office if you have any questions.    Attested:    I have discussed the care of Krista Chiang and I have examined the patient myselft and taken ros and hpi , including pertinent history and exam findings,  with the author of this note . I have reviewed the key elements of all parts of the encounter with the nurse practitioner/resident.  I agree with the assessment, plan and orders as documented by the above health care provider with the addendum made as necessary    More than 50% of the time was spent taking care of this patient in addition to the nurse practitioner time.  That also included history taking follow-up physical examination and review of system.    Electronically signed by Oswaldo Escobedo MD

## 2025-06-13 NOTE — PROGRESS NOTES
During report patient stated that she stopped her IV zosyn. She stated, \"the zosyn makes me itch every time, I called for it to be turned off, there is no reason for me to take something that does that to me.\" Attending notified via perfect serve.

## 2025-06-13 NOTE — CARE COORDINATION
Case Management   Daily Progress Note       Patient Name: Krista Chiang                   YOB: 1980  Diagnosis: Hypoglycemia [E16.2]  Community acquired bacterial pneumonia [J15.9]  Pneumonia due to infectious organism, unspecified laterality, unspecified part of lung [J18.9]                       GMLOS: 2.8 days  Length of Stay: 1  days    Readmission Risk (Low < 19, Mod (19-27), High > 27): Readmission Risk Score: 39.4      Patient is alert and oriented.    Spoke with patient, and Current Transitional Plan is:    [] Home Independently    [x] Home with HC; [facility].    [] Skilled Nursing Facility    [] Acute Rehabilitation    [] Long Term Acute Care (LTAC)    [] Other:     Medical Management: IV vancomycin, IV zosyn switched to IV levaquin. ID on board. GI consulted for nausea and vomiting. Clear liquid diet.     Testing Ordered: Labs, pneumonia workup.    Electronically signed by Jaqueline aMtias RN on 6/13/2025 at 9:45 AM

## 2025-06-13 NOTE — PROGRESS NOTES
Per phelbotomy, no one here on shift can get blood from the patient. Attending notified via perfect serve. No new orders at this time.

## 2025-06-13 NOTE — PROGRESS NOTES
[facility]   Pharmacy Pharmacokinetic Monitoring Service - Vancomycin    Consulting Provider: Daphnie Patrick APRN - NP    Indication: CAP   Target Concentration: Goal AUC/JAVED 400-600 mg*hr/L  Day of Therapy: 2  Additional Antimicrobials: zosyn    Pertinent Laboratory Values:   Wt Readings from Last 1 Encounters:   06/12/25 81.6 kg (180 lb)     Temp Readings from Last 1 Encounters:   06/13/25 98.1 °F (36.7 °C)     Estimated Creatinine Clearance: 113 mL/min (based on SCr of 0.7 mg/dL).  Recent Labs     06/12/25  0200   CREATININE 0.7   BUN 16   WBC 10.1       Pertinent Cultures: see micro     MRSA Nasal Swab: not ordered. Order placed by pharmacy.    Recent vancomycin administrations                     vancomycin (VANCOCIN) 1,500 mg in sodium chloride 0.9 % 250 mL IVPB ([id number]) (mg) 1,500 mg New Bag 06/13/25 0603     1,500 mg New Bag 06/12/25 1843    vancomycin (VANCOCIN) 2,000 mg in sodium chloride 0.9 % 500 mL IVPB (mg) 2,000 mg New Bag 06/12/25 0704                    Assessment:  Date/Time Current Dose Concentration Timing of Concentration (h) AUC   06/13 1500 mg IVPB Q12H -- -- ---   Note: Serum concentrations collected for AUC dosing may appear elevated if collected in close proximity to the dose administered, this is not necessarily an indication of toxicity    Plan:  Current dosing regimen is therapeutic  Continue current dose  Repeat vancomycin concentration ordered for 06/14 @ 0600   Pharmacy will continue to monitor patient and adjust therapy as indicated    Thank you for the consult,  Roma Belle RPH  6/13/2025 8:02 AM

## 2025-06-13 NOTE — PROGRESS NOTES
[facility]   Occupational Therapy Evaluation  Date: 25  Patient Name: Krista Chiang       Room: 2074/2074-01  MRN: [id number]  Account: [id number]   : 1980  (45 y.o.) Gender: female     Discharge Recommendations:  Further Occupational Therapy is recommended upon facility discharge.    OT Equipment Recommendations  Other: TBD    Referring Practitioner: Daphnie Patrick APRN - NP  Diagnosis: Community acquired pneumonia     Treatment Diagnosis: Impaired self care status    Past Medical History:  has a past medical history of Alcohol abuse, Alcoholic hepatitis without ascites ([facility]), Antiphospholipid syndrome, Anxiety, Arthritis, Painting esophagus, Bipolar disorder, unspecified ([facility]), 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 encounter, Constipation, Depression, Fibromyalgia, Genital herpes, unspecified, GERD (gastroesophageal reflux disease), H/O bariatric surgery, History of pulmonary embolism, Hx of blood clots, Hypertension, Lives in nursing home, Lumbosacral spondylosis without myelopathy, MDRO (multiple drug resistant organisms) resistance, Mobility impaired, MRSA (methicillin resistant staph aureus) culture positive, Muscle weakness, Obsessive-compulsive disorder, unspecified, Opioid abuse, in remission ([facility]), Pernicious anemia, Polyneuropathy, unspecified, PTSD (post-traumatic stress disorder), Pulmonary embolism ([facility]), Suicidal behavior, SVT (supraventricular tachycardia), Type 2 diabetes mellitus, with long-term current use of insulin ([facility]), Under care of team, and Under care of team.    Past Surgical History:   has a past surgical history that includes Cholecystectomy; Liver Resection (); Tonsillectomy; Abdominal hernia repair (); Abdominal hernia repair (2014); Bariatric Surgery (2013); Dilation and curettage of uterus (); Finger amputation (Left, 2015); lymph node biopsy (); Total abdominal  chair)  Scooting: Stand by assistance  Bed Mobility Comments: SBA for safety with HOB slightly elevated and use of railings    Balance  Balance  Sitting Balance: Stand by assistance  Standing Balance: Stand by assistance  Standing Balance  Time: 3-4 minutes  Activity: Functional transfers/mobility  Comment: BUE supported on RW    Transfers  Transfers  Sit to stand: Stand by assistance  Stand to sit: Stand by assistance  Transfer Comments: Good hand placement noted  Toilet Transfers  Toilet - Technique: Ambulating  Equipment Used: Standard toilet  Toilet Transfer: Stand by assistance  Toilet Transfers Comments: SBA for safety, good use of grab bars    Functional Mobility  Functional - Mobility Device: Rolling Walker  Activity: To/from bathroom (within pt's room)  Assist Level: Stand by assistance  Functional Mobility Comments: CGA initially progressing to SBA    Assessment  Assessment  Performance deficits / Impairments: Decreased functional mobility , Decreased ADL status, Decreased strength, Decreased safe awareness, Decreased endurance, Decreased balance, Decreased high-level IADLs  Treatment Diagnosis: Impaired self care status  Prognosis: Good  Decision Making: Medium Complexity  Discharge Recommendations: Patient would benefit from continued therapy after discharge    Activity Tolerance  Activity Tolerance: Patient Tolerated treatment well, Patient limited by pain    Safety Devices  Type of Devices: All fall risk precautions in place, Call light within reach, Chair alarm in place, Gait belt, Heels elevated for pressure relief, Patient at risk for falls, Left in chair, Nurse notified    Patient Education  Patient Education  Education Given To: Patient  Education Provided: Role of Therapy, Plan of Care, ADL Adaptive Strategies, Transfer Training, Fall Prevention Strategies, Mobility Training  Education Provided Comments: OT role, OT POC  Education Method: Verbal  Barriers to Learning: None  Education Outcome:

## 2025-06-13 NOTE — PLAN OF CARE
Problem: Chronic Conditions and Co-morbidities  Goal: Patient's chronic conditions and co-morbidity symptoms are monitored and maintained or improved  6/13/2025 1707 by Jesus Gates RN  Outcome: Progressing  Flowsheets (Taken 6/13/2025 0753)  Care Plan - Patient's Chronic Conditions and Co-Morbidity Symptoms are Monitored and Maintained or Improved:   Collaborate with multidisciplinary team to address chronic and comorbid conditions and prevent exacerbation or deterioration   Monitor and assess patient's chronic conditions and comorbid symptoms for stability, deterioration, or improvement   Update acute care plan with appropriate goals if chronic or comorbid symptoms are exacerbated and prevent overall improvement and discharge     Problem: Discharge Planning  Goal: Discharge to home or other facility with appropriate resources  6/13/2025 1707 by Jesus Gates RN  Outcome: Progressing  Flowsheets (Taken 6/13/2025 0753)  Discharge to home or other facility with appropriate resources:   Identify barriers to discharge with patient and caregiver   Arrange for needed discharge resources and transportation as appropriate   Identify discharge learning needs (meds, wound care, etc)   Refer to discharge planning if patient needs post-hospital services based on physician order or complex needs related to functional status, cognitive ability or social support system     Problem: Pain  Goal: Verbalizes/displays adequate comfort level or baseline comfort level  6/13/2025 1707 by Jesus Gates, RN  Outcome: Progressing     Problem: Skin/Tissue Integrity  Goal: Skin integrity remains intact  Description: 1.  Monitor for areas of redness and/or skin breakdown2.  Assess vascular access sites hourly3.  Every 4-6 hours minimum:  Change oxygen saturation probe site4.  Every 4-6 hours:  If on nasal continuous positive airway pressure, respiratory therapy assess nares and determine need for appliance change or resting period  6/13/2025  1707 by Jesus Gates, RN  Outcome: Progressing  Flowsheets  Taken 6/13/2025 1219  Skin Integrity Remains Intact: Monitor for areas of redness and/or skin breakdown  Taken 6/13/2025 0753  Skin Integrity Remains Intact: Monitor for areas of redness and/or skin breakdown     Problem: Safety - Adult  Goal: Free from fall injury  Outcome: Progressing  Flowsheets (Taken 6/13/2025 1219)  Free From Fall Injury: Instruct family/caregiver on patient safety     Problem: ABCDS Injury Assessment  Goal: Absence of physical injury  Outcome: Progressing  Flowsheets (Taken 6/13/2025 1219)  Absence of Physical Injury: Implement safety measures based on patient assessment     Problem: Nutrition Deficit:  Goal: Optimize nutritional status  Outcome: Progressing

## 2025-06-13 NOTE — PROGRESS NOTES
[facility] Internal Medicine  Raul Heard MD; Ezio Conway MD, Crystal Estrada MD,    Wilner Ash MD, Carli Mendoza MD.    [facility] Internal Medicine   IN-PATIENT SERVICE   [facility]    Progress note             Date:   6/13/2025  Patient name:  Krista Chiang  Date of admission:  6/12/2025  1:23 AM  MRN:   [id number]  Account:  [id number]  YOB: 1980  PCP:    Arielle Melton APRN - NP  Room:   2074/2074-01  Code Status:    Full Code    Chief Complaint:     Chief Complaint   Patient presents with    Hypoglycemia       History Obtained From:     Patient/EMR/Bedside RN    History of Present Illness:     Krista Chiang is a 44 y.o. Non- / non  female who presents with Hypoglycemia   and is admitted to the hospital for the management of Community acquired bacterial pneumonia.      Patient presented to ED per EMS for low blood sugar with glucose level 20.  Apparently she was awake enough to eat a few bites of food and drink which brought her blood sugar up to 87.  On arrival to ED she reports that she has been having increased nausea and vomiting.  States that she has not had much of an appetite but still took took 14 units of Lantus and a partial dose of sliding scale earlier in the morning.  Also reports that she had a fall around 11 AM at home.  States that she has increased left hip pain.  She is on anticoagulation for antiphospholipid syndrome.  CT imaging shows no acute fracture or injuries.  CT chest abdomen pelvis reveals right lower lobe pneumonia.  Started on IV vancomycin and IV Zosyn.  Hgb 9.7 which is patient's baseline.  Urinalysis negative for infection.     Being admitted to MedSurg unit for pneumonia and hypoglycemia.        6/13  Patient seen and examined nausea vomiting is better on clear liquid diet  Blood sugars getting better    Past Medical History:     Past Medical History:   Diagnosis Date    Alcohol  flowsheet  Pulse 68   Temp 98.1 °F (36.7 °C)   Resp 16   Ht 1.727 m (5' 7.99\")   Wt 81.6 kg (180 lb)   SpO2 100%   BMI 27.38 kg/m²   Temp (24hrs), Av.2 °F (36.8 °C), Min:98.1 °F (36.7 °C), Max:98.4 °F (36.9 °C)    Recent Labs     25  1050 25  1607 25  0605   POCGLU 112* 88 158* 104       Intake/Output Summary (Last 24 hours) at 2025 0913  Last data filed at 2025 2304  Gross per 24 hour   Intake 480 ml   Output --   Net 480 ml       General Appearance: alert, well appearing, and in no acute distress  Mental status: oriented to person, place, and time  Head: normocephalic, atraumatic  Eye: no icterus, redness, pupils equal and reactive, extraocular eye movements intact, conjunctiva clear  Ear: normal external ear, no discharge, hearing intact  Nose: no drainage noted  Mouth: mucous membranes moist  Neck: supple, no carotid bruits, thyroid not palpable  Lungs: Bilateral equal air entry, coarse breath sounds, rhonchi  Cardiovascular: normal rate, regular rhythm, no murmur, gallop, rub  Abdomen: Soft, nontender, nondistended, normal bowel sounds, no hepatomegaly or splenomegaly  Neurologic: There are no new focal motor or sensory deficits, normal muscle tone and bulk, no abnormal sensation, normal speech, cranial nerves II through XII grossly intact  Skin: No gross lesions, rashes, bruising or bleeding on exposed skin area  Extremities: peripheral pulses palpable, no pedal edema or calf pain with palpation  Psych: normal affect    Investigations:      Laboratory Testing:  Recent Results (from the past 24 hours)   POC Glucose Fingerstick    Collection Time: 25  9:26 AM   Result Value Ref Range    POC Glucose 68 65 - 105 mg/dL   POC Glucose Fingerstick    Collection Time: 25 10:50 AM   Result Value Ref Range    POC Glucose 112 (H) 65 - 105 mg/dL   POC Glucose Fingerstick    Collection Time: 25  4:07 PM   Result Value Ref Range    POC Glucose 88 65 -

## 2025-06-13 NOTE — PLAN OF CARE
Problem: Chronic Conditions and Co-morbidities  Goal: Patient's chronic conditions and co-morbidity symptoms are monitored and maintained or improved  6/13/2025 0442 by Kriss Pierre RN  Outcome: Progressing  Flowsheets (Taken 6/13/2025 0442)  Care Plan - Patient's Chronic Conditions and Co-Morbidity Symptoms are Monitored and Maintained or Improved:   Monitor and assess patient's chronic conditions and comorbid symptoms for stability, deterioration, or improvement   Collaborate with multidisciplinary team to address chronic and comorbid conditions and prevent exacerbation or deterioration   Update acute care plan with appropriate goals if chronic or comorbid symptoms are exacerbated and prevent overall improvement and discharge  Note: Made sure pt. Understood what their conditions was and why it was occurring. Also educated pt. On ways to prevent the condition from worsening and from occurring again.      Problem: Discharge Planning  Goal: Discharge to home or other facility with appropriate resources  6/13/2025 0442 by Kriss Pierre, RN  Outcome: Progressing  Flowsheets (Taken 6/13/2025 0442)  Discharge to home or other facility with appropriate resources:   Identify barriers to discharge with patient and caregiver   Arrange for needed discharge resources and transportation as appropriate   Refer to discharge planning if patient needs post-hospital services based on physician order or complex needs related to functional status, cognitive ability or social support system   Identify discharge learning needs (meds, wound care, etc)  Note: Inform pt. Of discharge teaching and planned. Instructed pt. To inform me if further teaching need to be done.      Problem: Pain  Goal: Verbalizes/displays adequate comfort level or baseline comfort level  6/13/2025 0442 by Kriss Pierre, RN  Outcome: Progressing  Flowsheets (Taken 6/13/2025 0442)  Verbalizes/displays adequate comfort level or baseline comfort level:

## 2025-06-13 NOTE — PROGRESS NOTES
Physical Therapy  [facility]   Physical Therapy Evaluation  Date: 25  Patient Name: Krista Chiang       Room: 4/4-  MRN: [id number]  Account: [id number]   : 1980  (45 y.o.) Gender: female     Discharge Recommendations:  Discharge Recommendations: Continue to assess pending progress     PT D/C Equipment  Other: TBD  PT Equipment Recommendations  Other: TBD     Past Medical History:  has a past medical history of Alcohol abuse, Alcoholic hepatitis without ascites ([facility]), Antiphospholipid syndrome, Anxiety, Arthritis, Painting esophagus, Bipolar disorder, unspecified ([facility]), 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 encounter, Constipation, Depression, Fibromyalgia, Genital herpes, unspecified, GERD (gastroesophageal reflux disease), H/O bariatric surgery, History of pulmonary embolism, Hx of blood clots, Hypertension, Lives in nursing home, Lumbosacral spondylosis without myelopathy, MDRO (multiple drug resistant organisms) resistance, Mobility impaired, MRSA (methicillin resistant staph aureus) culture positive, Muscle weakness, Obsessive-compulsive disorder, unspecified, Opioid abuse, in remission ([facility]), Pernicious anemia, Polyneuropathy, unspecified, PTSD (post-traumatic stress disorder), Pulmonary embolism ([facility]), Suicidal behavior, SVT (supraventricular tachycardia), Type 2 diabetes mellitus, with long-term current use of insulin ([facility]), Under care of team, and Under care of team.  Past Surgical History:   has a past surgical history that includes Cholecystectomy; Liver Resection (); Tonsillectomy; Abdominal hernia repair (); Abdominal hernia repair (2014); Bariatric Surgery (2013); Dilation and curettage of uterus (); Finger amputation (Left, 2015); lymph node biopsy (); Total abdominal hysterectomy w/ bilateral salpingoophorectomy (); IVC filter insertion; hip surgery (Left); Ankle fracture  transfer into tub alone)  Toileting: Independent  Prior Level of Assist for Homemaking: Independent (pt reports difficulty getting groceries and navigating her yard but was able to do yard work this past week in her WC)  Prior Level of Assist for Ambulation: Independent household ambulator, with or without device (uses wheeled walker in the house, wheelchair in the community)  Prior Level of Assist for Transfers: Independent  Active : Yes  Mode of Transportation: [facility]  Occupation: On disability  IADL Comments: sleeps in recliner    Restrictions  Restrictions/Precautions  Restrictions/Precautions: Isolation, Contact Precautions, Fall Risk (right cephalic IV)  Activity Level:  (use lift equipment)  Required Braces or Orthoses?: No (pt has a shoe lift for her LLE but pt reports she hates wearing it because it catches on the carpet and causes her to fall)  Implants Present? :  (denies)  Position Activity Restriction  Other Position/Activity Restrictions: Hx of LLE girdlestone procedure : NO LEFT HIP JOINT (THR PROSTHESIS TAKEN OUT);  Pt reports no current weight restrictions left LE.     Objective    Transfers  Transfers  Sit to Stand: Stand by assistance (rolling walker)  Stand to Sit: Stand by assistance (rolling walker)  Bed to Chair: Contact guard assistance (rolling walker)  Comment: pt completed commode transfer SBA w/ RW     Ambulation      Ambulation  Surface: Level tile  Device: Rolling Walker  Assistance: Contact guard assistance  Quality of Gait: toe touch weight bearing on LLE  Gait Deviations: Slow Ami, Decreased step height, Decreased step length  Distance: 10ft to bathroom, 25 ft from bathroom around the bed to early mobility chair  Comments: pt has shoe lift but choses not to wear due to reporting it trips her up     Stairs  Stairs/Curb  Stairs?: No (pt has ramped entrance)    Bed Mobility  Bed mobility  Rolling to Right: Stand by assistance  Supine to Sit: Stand by assistance  Sit to Supine:

## 2025-06-13 NOTE — CONSULTS
[facility] -   Infectious diseases evaluation  admission date 6/12/2025    reason for consultation:   Pneumonia    Impression :   Current:  Right lower lobe pneumonia suspected aspiration  Nausea, vomiting and weight loss  Allergy to Zosyn  Right upper thigh/abdominal wounds   Suspected chronic osteomyelitis/fluid collection to the left hip.  S/P left hip hemiarthroplasty performed by Dr. Traore on 7/19/2023 after sustaining a femoral neck fracture.    S/P left hip I&D /placement of antibiotic cement beads on 8/21/2023 due to infection post hemiarthroplasty.  The patient was treated with Avycaz due to resistant Klebsiella on culture.S/P left hip Girdlestone at [facility].    DM  Alcohol abuse   Antiphospholipid syndrome   H/O bariatric surgery   H/O PE/DVT status post IVC filter  H/O MRSA/MDRO/VRE/Acinetobacter  History of C. difficile colitis      HENCE:   Zosyn was discontinued, started on Levaquin  Follow blood cultures  Strep pneumo, Legionella antigen pending  Mycoplasma IgM pending  Follow CBC and renal function  GI consulted  Supportive care    Infection Control Recommendations   [place] Precautions  Contact Isolation       Antimicrobial Stewardship Recommendations   Simplification of therapy  Targeted therapy      History of Present Illness:   Initial history:  Krista Chiang is a 45 y.o.-year-old female was brought to the ED by EMS for hypoglycemia with reported glucose level of 20, was able to eat some and hypoglycemia resolved.  The patient had episodes of nausea and vomiting, decreased appetite and left hip pain.  She had occasional nonproductive cough, denied shortness of breath, no other complaints.  The patient was started initially on Zosyn developed severe itching with mild to her rash that was discontinued.  CT chest, abdomen and pelvis concerning for right lower lobe pneumonia. large fluid collection extending between the acetabulum and proximal femur  Stress Questionnaire     Feeling of Stress : Not at all   Social Connections: Moderately Integrated (7/8/2024)    Received from [facility]    Social Connection and Isolation Panel [NHANES]     Frequency of Communication with Friends and Family: More than three times a week     Frequency of Social Gatherings with Friends and Family: Three times a week     Attends Quaker Services: More than 4 times per year     Active Member of Clubs or Organizations: Yes     Attends Club or Organization Meetings: More than 4 times per year     Marital Status:    Intimate Partner Violence: Unknown (12/30/2024)    Received from [facility]    Humiliation, Afraid, Rape, and Kick questionnaire     Fear of Current or Ex-Partner: No     Emotionally Abused: Not on file     Physically Abused: Not on file     Sexually Abused: Not on file   Housing Stability: Low Risk  (6/12/2025)    Housing Stability Vital Sign     Unable to Pay for Housing in the Last Year: No     Number of Times Moved in the Last Year: 0     Homeless in the Last Year: No       Family History:     Family History   Problem Relation Age of Onset    Depression Mother     High Blood Pressure Mother     High Cholesterol Mother     Diabetes Father     Heart Disease Father     Kidney Disease Father     High Blood Pressure Father     High Cholesterol Father     Stroke Father     No Known Problems Brother     Breast Cancer Maternal Aunt     Cancer Maternal Uncle         of duodenum had whipple    Breast Cancer Maternal Cousin       Medical Decision Making:   I have independently reviewed/ordered the following labs:    CBC with Differential:   Recent Labs     06/12/25  0200   WBC 10.1   HGB 9.7*   HCT 32.3*      LYMPHOPCT 17*   MONOPCT 7   EOSPCT 1     BMP:  Recent Labs     06/12/25  0200      K 3.9   *   CO2 17*   BUN 16   CREATININE 0.7   MG 1.9     Hepatic Function Panel:   Recent Labs     06/12/25  0200   BILITOT <0.2   ALKPHOS 110*

## 2025-06-14 PROBLEM — Z88.1 ALLERGY TO ANTIBIOTIC: Status: ACTIVE | Noted: 2025-06-14

## 2025-06-14 LAB
ANION GAP SERPL CALCULATED.3IONS-SCNC: 13 MMOL/L (ref 9–16)
BASOPHILS # BLD: 0.05 K/UL (ref 0–0.2)
BASOPHILS NFR BLD: 1 % (ref 0–2)
BUN SERPL-MCNC: 7 MG/DL (ref 6–20)
CALCIUM SERPL-MCNC: 8.6 MG/DL (ref 8.6–10.4)
CHLORIDE SERPL-SCNC: 104 MMOL/L (ref 98–107)
CO2 SERPL-SCNC: 23 MMOL/L (ref 20–31)
CREAT SERPL-MCNC: 0.4 MG/DL (ref 0.7–1.2)
EOSINOPHIL # BLD: 0.16 K/UL (ref 0–0.44)
EOSINOPHILS RELATIVE PERCENT: 3 % (ref 0–4)
ERYTHROCYTE [DISTWIDTH] IN BLOOD BY AUTOMATED COUNT: 15.1 % (ref 11.5–14.9)
GFR, ESTIMATED: >90 ML/MIN/1.73M2
GLUCOSE BLD-MCNC: 152 MG/DL (ref 65–105)
GLUCOSE BLD-MCNC: 223 MG/DL (ref 65–105)
GLUCOSE BLD-MCNC: 228 MG/DL (ref 65–105)
GLUCOSE BLD-MCNC: 285 MG/DL (ref 65–105)
GLUCOSE SERPL-MCNC: 180 MG/DL (ref 74–99)
HCT VFR BLD AUTO: 33.9 % (ref 36–46)
HGB BLD-MCNC: 10.8 G/DL (ref 12–16)
IMM GRANULOCYTES # BLD AUTO: 0.05 K/UL (ref 0–0.3)
IMM GRANULOCYTES NFR BLD: 1 %
L PNEUMO1 AG UR QL IA.RAPID: NEGATIVE
LYMPHOCYTES NFR BLD: 1.92 K/UL (ref 1.1–3.7)
LYMPHOCYTES RELATIVE PERCENT: 37 % (ref 24–44)
MCH RBC QN AUTO: 29.6 PG (ref 26–34)
MCHC RBC AUTO-ENTMCNC: 31.9 G/DL (ref 31–37)
MCV RBC AUTO: 92.9 FL (ref 80–100)
MONOCYTES NFR BLD: 0.47 K/UL (ref 0.1–1.2)
MONOCYTES NFR BLD: 9 % (ref 3–12)
MORPHOLOGY: ABNORMAL
NEUTROPHILS NFR BLD: 49 % (ref 36–66)
NEUTS SEG NFR BLD: 2.55 K/UL (ref 1.5–8.1)
NRBC BLD-RTO: 0 PER 100 WBC
PLATELET # BLD AUTO: ABNORMAL K/UL (ref 150–450)
PMV BLD AUTO: ABNORMAL FL (ref 8–13.5)
POTASSIUM SERPL-SCNC: 4.5 MMOL/L (ref 3.7–5.3)
RBC # BLD AUTO: 3.65 M/UL (ref 3.95–5.11)
S PNEUM AG SPEC QL: NEGATIVE
SODIUM SERPL-SCNC: 140 MMOL/L (ref 136–145)
SPECIMEN SOURCE: NORMAL
WBC OTHER # BLD: 5.2 K/UL (ref 3.5–11)

## 2025-06-14 PROCEDURE — 80048 BASIC METABOLIC PNL TOTAL CA: CPT

## 2025-06-14 PROCEDURE — 97110 THERAPEUTIC EXERCISES: CPT

## 2025-06-14 PROCEDURE — APPNB30 APP NON BILLABLE TIME 0-30 MINS: Performed by: NURSE PRACTITIONER

## 2025-06-14 PROCEDURE — 2500000003 HC RX 250 WO HCPCS: Performed by: NURSE PRACTITIONER

## 2025-06-14 PROCEDURE — 6370000000 HC RX 637 (ALT 250 FOR IP): Performed by: NURSE PRACTITIONER

## 2025-06-14 PROCEDURE — 36415 COLL VENOUS BLD VENIPUNCTURE: CPT

## 2025-06-14 PROCEDURE — 99232 SBSQ HOSP IP/OBS MODERATE 35: CPT | Performed by: STUDENT IN AN ORGANIZED HEALTH CARE EDUCATION/TRAINING PROGRAM

## 2025-06-14 PROCEDURE — 6360000002 HC RX W HCPCS: Performed by: NURSE PRACTITIONER

## 2025-06-14 PROCEDURE — 1200000000 HC SEMI PRIVATE

## 2025-06-14 PROCEDURE — 99232 SBSQ HOSP IP/OBS MODERATE 35: CPT | Performed by: NURSE PRACTITIONER

## 2025-06-14 PROCEDURE — 6360000002 HC RX W HCPCS: Performed by: INTERNAL MEDICINE

## 2025-06-14 PROCEDURE — 6370000000 HC RX 637 (ALT 250 FOR IP): Performed by: INTERNAL MEDICINE

## 2025-06-14 PROCEDURE — 85025 COMPLETE CBC W/AUTO DIFF WBC: CPT

## 2025-06-14 PROCEDURE — 97530 THERAPEUTIC ACTIVITIES: CPT

## 2025-06-14 PROCEDURE — 82947 ASSAY GLUCOSE BLOOD QUANT: CPT

## 2025-06-14 PROCEDURE — 99233 SBSQ HOSP IP/OBS HIGH 50: CPT | Performed by: INTERNAL MEDICINE

## 2025-06-14 RX ORDER — LIDOCAINE 4 G/G
1 PATCH TOPICAL DAILY
Status: DISCONTINUED | OUTPATIENT
Start: 2025-06-14 | End: 2025-06-18 | Stop reason: HOSPADM

## 2025-06-14 RX ADMIN — OXYCODONE HYDROCHLORIDE 2.5 MG: 5 TABLET ORAL at 12:55

## 2025-06-14 RX ADMIN — OXYCODONE HYDROCHLORIDE AND ACETAMINOPHEN 1 TABLET: 5; 325 TABLET ORAL at 04:19

## 2025-06-14 RX ADMIN — TIZANIDINE 4 MG: 4 TABLET ORAL at 23:58

## 2025-06-14 RX ADMIN — OXYCODONE HYDROCHLORIDE 2.5 MG: 5 TABLET ORAL at 08:40

## 2025-06-14 RX ADMIN — OXYCODONE HYDROCHLORIDE 2.5 MG: 5 TABLET ORAL at 04:19

## 2025-06-14 RX ADMIN — Medication 1 TABLET: at 08:40

## 2025-06-14 RX ADMIN — OXYCODONE HYDROCHLORIDE AND ACETAMINOPHEN 1 TABLET: 5; 325 TABLET ORAL at 12:55

## 2025-06-14 RX ADMIN — VALSARTAN 80 MG: 80 TABLET, FILM COATED ORAL at 08:41

## 2025-06-14 RX ADMIN — FONDAPARINUX SODIUM 7.5 MG: 7.5 INJECTION, SOLUTION SUBCUTANEOUS at 08:41

## 2025-06-14 RX ADMIN — OXYCODONE HYDROCHLORIDE 2.5 MG: 5 TABLET ORAL at 17:38

## 2025-06-14 RX ADMIN — HYDROMORPHONE HYDROCHLORIDE 0.25 MG: 1 INJECTION, SOLUTION INTRAMUSCULAR; INTRAVENOUS; SUBCUTANEOUS at 14:43

## 2025-06-14 RX ADMIN — OXYCODONE HYDROCHLORIDE AND ACETAMINOPHEN 1 TABLET: 5; 325 TABLET ORAL at 22:04

## 2025-06-14 RX ADMIN — SUCRALFATE 1 G: 1 TABLET ORAL at 12:55

## 2025-06-14 RX ADMIN — SODIUM CHLORIDE, PRESERVATIVE FREE 10 ML: 5 INJECTION INTRAVENOUS at 22:05

## 2025-06-14 RX ADMIN — OXYCODONE HYDROCHLORIDE AND ACETAMINOPHEN 1 TABLET: 5; 325 TABLET ORAL at 08:40

## 2025-06-14 RX ADMIN — DICYCLOMINE HYDROCHLORIDE 10 MG: 10 CAPSULE ORAL at 14:43

## 2025-06-14 RX ADMIN — PRAVASTATIN SODIUM 40 MG: 40 TABLET ORAL at 22:04

## 2025-06-14 RX ADMIN — PANTOPRAZOLE SODIUM 40 MG: 40 TABLET, DELAYED RELEASE ORAL at 05:56

## 2025-06-14 RX ADMIN — SODIUM CHLORIDE, PRESERVATIVE FREE 10 ML: 5 INJECTION INTRAVENOUS at 08:53

## 2025-06-14 RX ADMIN — FLUOXETINE HYDROCHLORIDE 40 MG: 20 CAPSULE ORAL at 08:40

## 2025-06-14 RX ADMIN — INSULIN LISPRO 4 UNITS: 100 INJECTION, SOLUTION INTRAVENOUS; SUBCUTANEOUS at 22:05

## 2025-06-14 RX ADMIN — DICYCLOMINE HYDROCHLORIDE 10 MG: 10 CAPSULE ORAL at 05:56

## 2025-06-14 RX ADMIN — SODIUM CHLORIDE, PRESERVATIVE FREE 40 MG: 5 INJECTION INTRAVENOUS at 08:50

## 2025-06-14 RX ADMIN — OXYCODONE HYDROCHLORIDE AND ACETAMINOPHEN 1 TABLET: 5; 325 TABLET ORAL at 17:38

## 2025-06-14 RX ADMIN — DICYCLOMINE HYDROCHLORIDE 10 MG: 10 CAPSULE ORAL at 11:11

## 2025-06-14 RX ADMIN — BUPROPION HYDROCHLORIDE 300 MG: 300 TABLET, EXTENDED RELEASE ORAL at 08:40

## 2025-06-14 RX ADMIN — SUCRALFATE 1 G: 1 TABLET ORAL at 22:04

## 2025-06-14 RX ADMIN — SUCRALFATE 1 G: 1 TABLET ORAL at 16:35

## 2025-06-14 RX ADMIN — SUCRALFATE 1 G: 1 TABLET ORAL at 08:40

## 2025-06-14 RX ADMIN — INSULIN LISPRO 2 UNITS: 100 INJECTION, SOLUTION INTRAVENOUS; SUBCUTANEOUS at 16:35

## 2025-06-14 RX ADMIN — OXYCODONE HYDROCHLORIDE 2.5 MG: 5 TABLET ORAL at 22:04

## 2025-06-14 RX ADMIN — PANCRELIPASE LIPASE, PANCRELIPASE PROTEASE, PANCRELIPASE AMYLASE 40000 UNITS: 20000; 63000; 84000 CAPSULE, DELAYED RELEASE ORAL at 16:34

## 2025-06-14 RX ADMIN — HYDROMORPHONE HYDROCHLORIDE 0.25 MG: 1 INJECTION, SOLUTION INTRAMUSCULAR; INTRAVENOUS; SUBCUTANEOUS at 01:23

## 2025-06-14 RX ADMIN — PANCRELIPASE LIPASE, PANCRELIPASE PROTEASE, PANCRELIPASE AMYLASE 40000 UNITS: 20000; 63000; 84000 CAPSULE, DELAYED RELEASE ORAL at 08:41

## 2025-06-14 RX ADMIN — INSULIN LISPRO 2 UNITS: 100 INJECTION, SOLUTION INTRAVENOUS; SUBCUTANEOUS at 11:11

## 2025-06-14 RX ADMIN — PANCRELIPASE LIPASE, PANCRELIPASE PROTEASE, PANCRELIPASE AMYLASE 40000 UNITS: 20000; 63000; 84000 CAPSULE, DELAYED RELEASE ORAL at 11:11

## 2025-06-14 RX ADMIN — LEVOFLOXACIN 750 MG: 500 TABLET, FILM COATED ORAL at 10:00

## 2025-06-14 ASSESSMENT — PAIN DESCRIPTION - DESCRIPTORS
DESCRIPTORS: STABBING;SHARP
DESCRIPTORS: ACHING
DESCRIPTORS: ACHING
DESCRIPTORS: BURNING;JABBING
DESCRIPTORS: THROBBING;SHARP
DESCRIPTORS: BURNING;GNAWING
DESCRIPTORS: BURNING;SHARP
DESCRIPTORS: ACHING

## 2025-06-14 ASSESSMENT — PAIN - FUNCTIONAL ASSESSMENT
PAIN_FUNCTIONAL_ASSESSMENT: ACTIVITIES ARE NOT PREVENTED

## 2025-06-14 ASSESSMENT — PAIN DESCRIPTION - LOCATION
LOCATION: CHEST;HIP
LOCATION: HIP;CHEST
LOCATION: CHEST
LOCATION: CHEST
LOCATION: HIP

## 2025-06-14 ASSESSMENT — PAIN DESCRIPTION - ORIENTATION
ORIENTATION: LEFT
ORIENTATION: MID

## 2025-06-14 ASSESSMENT — PAIN SCALES - GENERAL
PAINLEVEL_OUTOF10: 7
PAINLEVEL_OUTOF10: 8
PAINLEVEL_OUTOF10: 7
PAINLEVEL_OUTOF10: 7
PAINLEVEL_OUTOF10: 8
PAINLEVEL_OUTOF10: 8
PAINLEVEL_OUTOF10: 7
PAINLEVEL_OUTOF10: 8

## 2025-06-14 ASSESSMENT — PAIN DESCRIPTION - PAIN TYPE: TYPE: CHRONIC PAIN

## 2025-06-14 ASSESSMENT — PAIN SCALES - WONG BAKER: WONGBAKER_NUMERICALRESPONSE: NO HURT

## 2025-06-14 NOTE — PROGRESS NOTES
[facility]    Gastroenterology Daily Progress Note      Patient:   Krista Chiang   :    1980   Facility:   [facility]   Date:     2025  Consultant:   BERNARDA Ryan - CNP, CNP      SUBJECTIVE  45 y.o. female admitted 2025 with Hypoglycemia [E16.2]  Community acquired bacterial pneumonia [J15.9]  Pneumonia due to infectious organism, unspecified laterality, unspecified part of lung [J18.9] and seen for nausea and vomiting the pt was seen and examined. No c/o nausea or emesis this am, per day rn no vomiting overnight. C/o generalized abdominal pain that is \"not new\" per pt..        OBJECTIVE  Scheduled Meds:   lidocaine  1 patch TransDERmal Daily    pantoprazole (PROTONIX) 40 mg in sodium chloride (PF) 0.9 % 10 mL injection  40 mg IntraVENous Daily    levoFLOXacin  750 mg Oral Q24H    buPROPion  300 mg Oral QAM    dicyclomine  10 mg Oral TID AC    FLUoxetine  40 mg Oral Daily    fondaparinux  7.5 mg SubCUTAneous Daily    [Held by provider] insulin glargine  15 Units SubCUTAneous Nightly    lipase-protease-amylase  40,000 Units Oral TID WC    insulin lispro  0-8 Units SubCUTAneous 4x Daily AC & HS    therapeutic multivitamin-minerals  1 tablet Oral Daily    pravastatin  40 mg Oral Nightly    sucralfate  1 g Oral 4x Daily    valsartan  80 mg Oral Daily    sodium chloride flush  5-40 mL IntraVENous 2 times per day       Vital Signs:  BP (!) 136/58   Pulse 71   Temp 98.2 °F (36.8 °C)   Resp 16   Ht 1.727 m (5' 7.99\")   Wt 81.6 kg (180 lb)   SpO2 98%   BMI 27.38 kg/m²    Review of Systems - History obtained from chart review and the patient  General ROS: negative  Respiratory ROS: no cough, shortness of breath, or wheezing  Cardiovascular ROS: no chest pain or dyspnea on exertion  Gastrointestinal ROS: no abdominal pain, change in bowel habits, or black or bloody stools   Physical Exam:     General Appearance: alert and oriented to person, place and time,

## 2025-06-14 NOTE — PLAN OF CARE
Problem: Discharge Planning  Goal: Discharge to home or other facility with appropriate resources  6/14/2025 0418 by Kriss Pierre, RN  Outcome: Progressing  Flowsheets (Taken 6/14/2025 0418)  Discharge to home or other facility with appropriate resources:   Identify barriers to discharge with patient and caregiver   Identify discharge learning needs (meds, wound care, etc)   Refer to discharge planning if patient needs post-hospital services based on physician order or complex needs related to functional status, cognitive ability or social support system   Arrange for needed discharge resources and transportation as appropriate  Note: Inform pt. Of discharge teaching and planned. Instructed pt. To inform me if further teaching need to be done.      Problem: Pain  Goal: Verbalizes/displays adequate comfort level or baseline comfort level  6/14/2025 0418 by Kriss Pierre, RN  Outcome: Progressing  Flowsheets (Taken 6/14/2025 0418)  Verbalizes/displays adequate comfort level or baseline comfort level:   Encourage patient to monitor pain and request assistance   Administer analgesics based on type and severity of pain and evaluate response   Consider cultural and social influences on pain and pain management   Notify Licensed Independent Practitioner if interventions unsuccessful or patient reports new pain   Implement non-pharmacological measures as appropriate and evaluate response   Assess pain using appropriate pain scale  Note: PT. Received pain meds in timely manner. Teach pt. Non-pharm. Methods to handle pain.      Problem: Skin/Tissue Integrity  Goal: Skin integrity remains intact  Description: 1.  Monitor for areas of redness and/or skin breakdown2.  Assess vascular access sites hourly3.  Every 4-6 hours minimum:  Change oxygen saturation probe site4.  Every 4-6 hours:  If on nasal continuous positive airway pressure, respiratory therapy assess nares and determine need for appliance change or resting

## 2025-06-14 NOTE — CARE COORDINATION
Case Management   Daily Progress Note       Patient Name: Krista Chiang                   YOB: 1980  Diagnosis: Hypoglycemia [E16.2]  Community acquired bacterial pneumonia [J15.9]  Pneumonia due to infectious organism, unspecified laterality, unspecified part of lung [J18.9]                       GMLOS: 2.8 days  Length of Stay: 2  days    Readmission Risk (Low < 19, Mod (19-27), High > 27): Readmission Risk Score: 39.5      Chart Reviewed, and Current Transitional Plan is:    [] Home Independently    [x] Home with  - [facility]'s.    [] Skilled Nursing Facility    [] Acute Rehabilitation    [] Long Term Acute Care (LTAC)    [] Other:     Additional Notes:     Active order for PO Levaquin.    UGI/SBFT on Monday.    Electronically signed by Helene Vernon RN on 6/14/2025 at 12:55 PM

## 2025-06-14 NOTE — PLAN OF CARE
Problem: Chronic Conditions and Co-morbidities  Goal: Patient's chronic conditions and co-morbidity symptoms are monitored and maintained or improved  6/14/2025 1619 by Cassy Orantes RN  Outcome: Progressing  6/14/2025 1612 by Cassy Orantes RN  Outcome: Progressing  Flowsheets (Taken 6/14/2025 0900)  Care Plan - Patient's Chronic Conditions and Co-Morbidity Symptoms are Monitored and Maintained or Improved: Monitor and assess patient's chronic conditions and comorbid symptoms for stability, deterioration, or improvement     Problem: Discharge Planning  Goal: Discharge to home or other facility with appropriate resources  6/14/2025 1619 by Cassy Orantes RN  Outcome: Progressing  6/14/2025 1612 by Cassy Orantes RN  Outcome: Progressing  Flowsheets (Taken 6/14/2025 0900)  Discharge to home or other facility with appropriate resources: Identify barriers to discharge with patient and caregiver  6/14/2025 0418 by Kriss Pierre RN  Outcome: Progressing  Flowsheets (Taken 6/14/2025 0418)  Discharge to home or other facility with appropriate resources:   Identify barriers to discharge with patient and caregiver   Identify discharge learning needs (meds, wound care, etc)   Refer to discharge planning if patient needs post-hospital services based on physician order or complex needs related to functional status, cognitive ability or social support system   Arrange for needed discharge resources and transportation as appropriate  Note: Inform pt. Of discharge teaching and planned. Instructed pt. To inform me if further teaching need to be done.      Problem: Pain  Goal: Verbalizes/displays adequate comfort level or baseline comfort level  6/14/2025 1619 by Cassy Orantes RN  Outcome: Progressing  6/14/2025 1612 by Cassy Orantes RN  Outcome: Progressing  6/14/2025 0418 by Kriss Pierre RN  Outcome: Progressing  Flowsheets (Taken 6/14/2025 0418)  Verbalizes/displays adequate comfort level or baseline comfort level:

## 2025-06-14 NOTE — PROGRESS NOTES
Physical Therapy        Physical Therapy Cancel Note      DATE: 2025    NAME: Krista Chiang  MRN: [id number]   : 1980      Patient not seen this date for Physical Therapy due to:    Other: Cx; patient currently working with OT. Patient did report upon writer's approach she does not want to get up out of bed. Writer plans to reproach at a later time. Will continue to follow for patient needs/updates.      Electronically signed by Reyna Barclay PTA on 2025 at 2:50 PM

## 2025-06-14 NOTE — PLAN OF CARE
Problem: Chronic Conditions and Co-morbidities  Goal: Patient's chronic conditions and co-morbidity symptoms are monitored and maintained or improved  Outcome: Progressing  Flowsheets (Taken 6/14/2025 0900)  Care Plan - Patient's Chronic Conditions and Co-Morbidity Symptoms are Monitored and Maintained or Improved: Monitor and assess patient's chronic conditions and comorbid symptoms for stability, deterioration, or improvement     Problem: Discharge Planning  Goal: Discharge to home or other facility with appropriate resources  6/14/2025 1612 by Cassy Orantes RN  Outcome: Progressing  Flowsheets (Taken 6/14/2025 0900)  Discharge to home or other facility with appropriate resources: Identify barriers to discharge with patient and caregiver  6/14/2025 0418 by Kriss Pierre RN  Outcome: Progressing  Flowsheets (Taken 6/14/2025 0418)  Discharge to home or other facility with appropriate resources:   Identify barriers to discharge with patient and caregiver   Identify discharge learning needs (meds, wound care, etc)   Refer to discharge planning if patient needs post-hospital services based on physician order or complex needs related to functional status, cognitive ability or social support system   Arrange for needed discharge resources and transportation as appropriate  Note: Inform pt. Of discharge teaching and planned. Instructed pt. To inform me if further teaching need to be done.      Problem: Pain  Goal: Verbalizes/displays adequate comfort level or baseline comfort level  6/14/2025 1612 by Cassy Orantes RN  Outcome: Progressing  6/14/2025 0418 by Kriss Pierre, RN  Outcome: Progressing  Flowsheets (Taken 6/14/2025 0418)  Verbalizes/displays adequate comfort level or baseline comfort level:   Encourage patient to monitor pain and request assistance   Administer analgesics based on type and severity of pain and evaluate response   Consider cultural and social influences on pain and pain management

## 2025-06-14 NOTE — PROGRESS NOTES
[facility]   INPATIENT OCCUPATIONAL THERAPY  PROGRESS NOTE  Date: 2025  Patient Name: Krista Chiang       Room:   MRN: [id number]    : 1980  (45 y.o.)  Gender: female   Referring Practitioner: Daphnie Patrick APRN - NP  Diagnosis: Community acquired pneumonia    Discharge Recommendations:  Further Occupational Therapy is recommended upon facility discharge.    OT Equipment Recommendations  Other: TBD    Restrictions/Precautions  Restrictions/Precautions  Restrictions/Precautions: Isolation;Contact Precautions;Fall Risk  Required Braces or Orthoses?: No (has shoe lift for LLE but does not wear)  Implants Present? :  (denies)  Position Activity Restriction  Other Position/Activity Restrictions: Hx of LLE girdlestone procedure : NO LEFT HIP JOINT (THR PROSTHESIS TAKEN OUT);  Pt reports no current weight restrictions left LE.    O2 Device: None (Room air)    Subjective  Subjective  Subjective: \"I am not getting up right now\" pt only agreeable to limited OT tx  Pain  Pre-Pain: 8  Post-Pain: 8  Pain Location: Other (Comment) (chest)  Pain Descriptor: Aching  Pain Interventions: Repositioning;Nurse notified;Rest (RN and aide aware of pain)  Comments: OK per PEBBLES Candelaria for OT tx. RN aware of pt's nausea and pain    Objective  Orientation  Overall Orientation Status: Within Functional Limits  Cognition  Overall Cognitive Status: WFL    Activities of Daily Living  Additional Comments: Pt declines completion of any ADLs at this time. Pt continues to be limited by decreased strength, endurance, activithy tolerance, increased pain and impaired balance which impacts the pts ability to safely and independently complete self care    Balance  Balance  Sitting Balance: Stand by assistance    Transfers/Mobility  Bed mobility  Supine to Sit: Stand by assistance  Sit to Supine: Stand by assistance  Scooting: Stand by assistance  Bed Mobility Comments: Pt only agreeable to sit EOB for

## 2025-06-14 NOTE — PROGRESS NOTES
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 encounter     left    Constipation 09/03/2019    Depression 07/12/2015    Fibromyalgia     Genital herpes, unspecified     GERD (gastroesophageal reflux disease) 2021    H/O bariatric surgery 2013    Awa and Y    History of pulmonary embolism     Hx of blood clots     clots in both legs and lungs     Hypertension 2012    Lives in nursing home 08/26/2023    [facility] [place] :  # [phone] , fax # [phone]    Lumbosacral spondylosis without myelopathy     MDRO (multiple drug resistant organisms) resistance 2010    MRSA (abd)    Mobility impaired     wheelchair, uses a walker and pivots on right foot    MRSA (methicillin resistant staph aureus) culture positive 02/10/2017    urine    Muscle weakness     Obsessive-compulsive disorder, unspecified     Opioid abuse, in remission (HCC)     Pernicious anemia     Polyneuropathy, unspecified     PTSD (post-traumatic stress disorder)     Pulmonary embolism (HCC) 2010    Suicidal behavior 12/14/2015    many attempts    SVT (supraventricular tachycardia) 04/07/2017    Type 2 diabetes mellitus, with long-term current use of insulin ([facility]) 2013    Under care of team     ID, Dr. Jorge, last seen 10/26/2023 while in hospital    Under care of team     Megan, Dr. Traore, last seen by resident in ER 10/30/2023       Past Surgical  History:     Past Surgical History:   Procedure Laterality Date    ABDOMINAL HERNIA REPAIR  2012    incisional hernia repair, complicated by infection, had multiple surgeries for this    ABDOMINAL HERNIA REPAIR  11/03/2014    ABLATION OF DYSRHYTHMIC FOCUS  05/12/2025    ANKLE FRACTURE SURGERY Left     8/21/2023  HIP IRRIGATION AND DEBRIDEMENT AND PLACEMENT OF ANTIBIOTIC IMPREGNATED CEMENT BEADS performed by Dipak Traore DO at [facility] OR    ANKLE FRACTURE SURGERY Left 10/24/2023    IRRIGATION AND DEBRIDEMENT OF LEFT HIP WITH  Insecurity - Food Insecurity Present (3/18/2025)    Hunger Vital Sign     Worried About Running Out of Food in the Last Year: Sometimes true     Ran Out of Food in the Last Year: Sometimes true   Transportation Needs: Unmet Transportation Needs (6/12/2025)    PRAPARE - Transportation     Lack of Transportation (Medical): Yes     Lack of Transportation (Non-Medical): Yes   Physical Activity: Insufficiently Active (4/15/2021)    Received from [employer] [facility]    Exercise Vital Sign     Days of Exercise per Week: 2 days     Minutes of Exercise per Session: 50 min   Stress: No Stress Concern Present (8/15/2024)    Received from [facility]    [facility] of Occupational Health - Occupational Stress Questionnaire     Feeling of Stress : Not at all   Social Connections: Moderately Integrated (7/8/2024)    Received from [facility]    Social Connection and Isolation Panel [NHANES]     Frequency of Communication with Friends and Family: More than three times a week     Frequency of Social Gatherings with Friends and Family: Three times a week     Attends Hinduism Services: More than 4 times per year     Active Member of Clubs or Organizations: Yes     Attends Club or Organization Meetings: More than 4 times per year     Marital Status:    Intimate Partner Violence: Unknown (12/30/2024)    Received from [facility]    Humiliation, Afraid, Rape, and Kick questionnaire     Fear of Current or Ex-Partner: No     Emotionally Abused: Not on file     Physically Abused: Not on file     Sexually Abused: Not on file   Housing Stability: Low Risk  (6/12/2025)    Housing Stability Vital Sign     Unable to Pay for Housing in the Last Year: No     Number of Times Moved in the Last Year: 0     Homeless in the Last Year: No       Family History:     Family History   Problem Relation Age of Onset    Depression Mother     High Blood Pressure Mother     High Cholesterol Mother

## 2025-06-14 NOTE — PROGRESS NOTES
[facility] Internal Medicine  Raul Heard MD; Ezio Conway MD, Crystal Estrada MD,    Wilner Ash MD, Carli Mendoza MD.    [facility] Internal Medicine   IN-PATIENT SERVICE   [facility]    Progress note             Date:   6/14/2025  Patient name:  Krista Chiang  Date of admission:  6/12/2025  1:23 AM  MRN:   [id number]  Account:  [id number]  YOB: 1980  PCP:    Arielle Melton APRN - NP  Room:   2074/2074-01  Code Status:    Full Code    Chief Complaint:     Chief Complaint   Patient presents with    Hypoglycemia       History Obtained From:     Patient/EMR/Bedside RN    History of Present Illness:     Krista Chiang is a 44 y.o. Non- / non  female who presents with Hypoglycemia   and is admitted to the hospital for the management of Community acquired bacterial pneumonia.      Patient presented to ED per EMS for low blood sugar with glucose level 20.  Apparently she was awake enough to eat a few bites of food and drink which brought her blood sugar up to 87.  On arrival to ED she reports that she has been having increased nausea and vomiting.  States that she has not had much of an appetite but still took took 14 units of Lantus and a partial dose of sliding scale earlier in the morning.  Also reports that she had a fall around 11 AM at home.  States that she has increased left hip pain.  She is on anticoagulation for antiphospholipid syndrome.  CT imaging shows no acute fracture or injuries.  CT chest abdomen pelvis reveals right lower lobe pneumonia.  Started on IV vancomycin and IV Zosyn.  Hgb 9.7 which is patient's baseline.  Urinalysis negative for infection.     Being admitted to MedSurg unit for pneumonia and hypoglycemia.        6/13  Patient seen and examined nausea vomiting is better on clear liquid diet  Blood sugars getting better    Past Medical History:     Past Medical History:   Diagnosis Date    Alcohol

## 2025-06-15 LAB
ANION GAP SERPL CALCULATED.3IONS-SCNC: 10 MMOL/L (ref 9–16)
BASOPHILS # BLD: 0.04 K/UL (ref 0–0.2)
BASOPHILS NFR BLD: 1 % (ref 0–2)
BUN SERPL-MCNC: 15 MG/DL (ref 6–20)
CALCIUM SERPL-MCNC: 8.8 MG/DL (ref 8.6–10.4)
CHLORIDE SERPL-SCNC: 101 MMOL/L (ref 98–107)
CO2 SERPL-SCNC: 24 MMOL/L (ref 20–31)
CORTIS SERPL-MCNC: 4.7 UG/DL (ref 2.5–19.5)
CORTISOL COLLECTION INFO: NORMAL
CREAT SERPL-MCNC: 0.5 MG/DL (ref 0.7–1.2)
EOSINOPHIL # BLD: 0.16 K/UL (ref 0–0.44)
EOSINOPHILS RELATIVE PERCENT: 3 % (ref 0–4)
ERYTHROCYTE [DISTWIDTH] IN BLOOD BY AUTOMATED COUNT: 15.2 % (ref 11.5–14.9)
GFR, ESTIMATED: >90 ML/MIN/1.73M2
GLUCOSE BLD-MCNC: 212 MG/DL (ref 65–105)
GLUCOSE BLD-MCNC: 256 MG/DL (ref 65–105)
GLUCOSE BLD-MCNC: 258 MG/DL (ref 65–105)
GLUCOSE BLD-MCNC: 276 MG/DL (ref 65–105)
GLUCOSE SERPL-MCNC: 257 MG/DL (ref 74–99)
HCT VFR BLD AUTO: 33.1 % (ref 36–46)
HGB BLD-MCNC: 10.1 G/DL (ref 12–16)
IMM GRANULOCYTES # BLD AUTO: <0.03 K/UL (ref 0–0.3)
IMM GRANULOCYTES NFR BLD: 0 %
LYMPHOCYTES NFR BLD: 1.57 K/UL (ref 1.1–3.7)
LYMPHOCYTES RELATIVE PERCENT: 30 % (ref 24–44)
MCH RBC QN AUTO: 28.9 PG (ref 26–34)
MCHC RBC AUTO-ENTMCNC: 30.5 G/DL (ref 31–37)
MCV RBC AUTO: 94.6 FL (ref 80–100)
MONOCYTES NFR BLD: 0.36 K/UL (ref 0.1–1.2)
MONOCYTES NFR BLD: 7 % (ref 3–12)
NEUTROPHILS NFR BLD: 59 % (ref 36–66)
NEUTS SEG NFR BLD: 3.17 K/UL (ref 1.5–8.1)
NRBC BLD-RTO: 0 PER 100 WBC
PLATELET # BLD AUTO: 395 K/UL (ref 150–450)
PMV BLD AUTO: 10.9 FL (ref 8–13.5)
POTASSIUM SERPL-SCNC: 4.2 MMOL/L (ref 3.7–5.3)
RBC # BLD AUTO: 3.5 M/UL (ref 3.95–5.11)
SODIUM SERPL-SCNC: 135 MMOL/L (ref 136–145)
WBC OTHER # BLD: 5.3 K/UL (ref 3.5–11)

## 2025-06-15 PROCEDURE — 2500000003 HC RX 250 WO HCPCS: Performed by: NURSE PRACTITIONER

## 2025-06-15 PROCEDURE — 6360000002 HC RX W HCPCS: Performed by: NURSE PRACTITIONER

## 2025-06-15 PROCEDURE — 99233 SBSQ HOSP IP/OBS HIGH 50: CPT | Performed by: STUDENT IN AN ORGANIZED HEALTH CARE EDUCATION/TRAINING PROGRAM

## 2025-06-15 PROCEDURE — 97110 THERAPEUTIC EXERCISES: CPT

## 2025-06-15 PROCEDURE — 6360000002 HC RX W HCPCS: Performed by: INTERNAL MEDICINE

## 2025-06-15 PROCEDURE — APPSS30 APP SPLIT SHARED TIME 16-30 MINUTES: Performed by: NURSE PRACTITIONER

## 2025-06-15 PROCEDURE — 97116 GAIT TRAINING THERAPY: CPT

## 2025-06-15 PROCEDURE — 99233 SBSQ HOSP IP/OBS HIGH 50: CPT | Performed by: INTERNAL MEDICINE

## 2025-06-15 PROCEDURE — 1200000000 HC SEMI PRIVATE

## 2025-06-15 PROCEDURE — 80048 BASIC METABOLIC PNL TOTAL CA: CPT

## 2025-06-15 PROCEDURE — 6370000000 HC RX 637 (ALT 250 FOR IP): Performed by: NURSE PRACTITIONER

## 2025-06-15 PROCEDURE — 82947 ASSAY GLUCOSE BLOOD QUANT: CPT

## 2025-06-15 PROCEDURE — 6370000000 HC RX 637 (ALT 250 FOR IP): Performed by: INTERNAL MEDICINE

## 2025-06-15 PROCEDURE — 36415 COLL VENOUS BLD VENIPUNCTURE: CPT

## 2025-06-15 PROCEDURE — 99232 SBSQ HOSP IP/OBS MODERATE 35: CPT | Performed by: NURSE PRACTITIONER

## 2025-06-15 PROCEDURE — 97530 THERAPEUTIC ACTIVITIES: CPT

## 2025-06-15 PROCEDURE — 85025 COMPLETE CBC W/AUTO DIFF WBC: CPT

## 2025-06-15 RX ORDER — VALSARTAN 40 MG/1
40 TABLET ORAL DAILY
Status: DISCONTINUED | OUTPATIENT
Start: 2025-06-16 | End: 2025-06-18 | Stop reason: HOSPADM

## 2025-06-15 RX ORDER — INSULIN GLARGINE 100 [IU]/ML
15 INJECTION, SOLUTION SUBCUTANEOUS NIGHTLY
Status: DISCONTINUED | OUTPATIENT
Start: 2025-06-15 | End: 2025-06-18 | Stop reason: HOSPADM

## 2025-06-15 RX ADMIN — OXYCODONE HYDROCHLORIDE 2.5 MG: 5 TABLET ORAL at 03:07

## 2025-06-15 RX ADMIN — OXYCODONE HYDROCHLORIDE AND ACETAMINOPHEN 1 TABLET: 5; 325 TABLET ORAL at 16:25

## 2025-06-15 RX ADMIN — SODIUM CHLORIDE, PRESERVATIVE FREE 10 ML: 5 INJECTION INTRAVENOUS at 07:34

## 2025-06-15 RX ADMIN — OXYCODONE HYDROCHLORIDE 2.5 MG: 5 TABLET ORAL at 16:25

## 2025-06-15 RX ADMIN — VALSARTAN 80 MG: 80 TABLET, FILM COATED ORAL at 07:30

## 2025-06-15 RX ADMIN — SODIUM CHLORIDE, PRESERVATIVE FREE 10 ML: 5 INJECTION INTRAVENOUS at 20:47

## 2025-06-15 RX ADMIN — PANCRELIPASE LIPASE, PANCRELIPASE PROTEASE, PANCRELIPASE AMYLASE 40000 UNITS: 20000; 63000; 84000 CAPSULE, DELAYED RELEASE ORAL at 16:26

## 2025-06-15 RX ADMIN — SUCRALFATE 1 G: 1 TABLET ORAL at 12:05

## 2025-06-15 RX ADMIN — INSULIN LISPRO 4 UNITS: 100 INJECTION, SOLUTION INTRAVENOUS; SUBCUTANEOUS at 11:31

## 2025-06-15 RX ADMIN — PANCRELIPASE LIPASE, PANCRELIPASE PROTEASE, PANCRELIPASE AMYLASE 40000 UNITS: 20000; 63000; 84000 CAPSULE, DELAYED RELEASE ORAL at 11:31

## 2025-06-15 RX ADMIN — OXYCODONE HYDROCHLORIDE 2.5 MG: 5 TABLET ORAL at 20:42

## 2025-06-15 RX ADMIN — DICYCLOMINE HYDROCHLORIDE 10 MG: 10 CAPSULE ORAL at 11:31

## 2025-06-15 RX ADMIN — DICYCLOMINE HYDROCHLORIDE 10 MG: 10 CAPSULE ORAL at 06:14

## 2025-06-15 RX ADMIN — SUCRALFATE 1 G: 1 TABLET ORAL at 16:24

## 2025-06-15 RX ADMIN — OXYCODONE HYDROCHLORIDE AND ACETAMINOPHEN 1 TABLET: 5; 325 TABLET ORAL at 20:41

## 2025-06-15 RX ADMIN — PRAVASTATIN SODIUM 40 MG: 40 TABLET ORAL at 20:41

## 2025-06-15 RX ADMIN — PANCRELIPASE LIPASE, PANCRELIPASE PROTEASE, PANCRELIPASE AMYLASE 40000 UNITS: 20000; 63000; 84000 CAPSULE, DELAYED RELEASE ORAL at 07:30

## 2025-06-15 RX ADMIN — OXYCODONE HYDROCHLORIDE AND ACETAMINOPHEN 1 TABLET: 5; 325 TABLET ORAL at 12:06

## 2025-06-15 RX ADMIN — Medication 1 TABLET: at 07:29

## 2025-06-15 RX ADMIN — MIDODRINE HYDROCHLORIDE 2.5 MG: 2.5 TABLET ORAL at 06:17

## 2025-06-15 RX ADMIN — LEVOFLOXACIN 750 MG: 500 TABLET, FILM COATED ORAL at 09:23

## 2025-06-15 RX ADMIN — FLUOXETINE HYDROCHLORIDE 40 MG: 20 CAPSULE ORAL at 07:29

## 2025-06-15 RX ADMIN — INSULIN LISPRO 4 UNITS: 100 INJECTION, SOLUTION INTRAVENOUS; SUBCUTANEOUS at 07:28

## 2025-06-15 RX ADMIN — INSULIN GLARGINE 15 UNITS: 100 INJECTION, SOLUTION SUBCUTANEOUS at 20:44

## 2025-06-15 RX ADMIN — INSULIN LISPRO 4 UNITS: 100 INJECTION, SOLUTION INTRAVENOUS; SUBCUTANEOUS at 20:45

## 2025-06-15 RX ADMIN — SUCRALFATE 1 G: 1 TABLET ORAL at 20:41

## 2025-06-15 RX ADMIN — FONDAPARINUX SODIUM 7.5 MG: 7.5 INJECTION, SOLUTION SUBCUTANEOUS at 09:23

## 2025-06-15 RX ADMIN — HYDROMORPHONE HYDROCHLORIDE 0.25 MG: 1 INJECTION, SOLUTION INTRAMUSCULAR; INTRAVENOUS; SUBCUTANEOUS at 00:52

## 2025-06-15 RX ADMIN — SUCRALFATE 1 G: 1 TABLET ORAL at 07:30

## 2025-06-15 RX ADMIN — INSULIN LISPRO 2 UNITS: 100 INJECTION, SOLUTION INTRAVENOUS; SUBCUTANEOUS at 16:24

## 2025-06-15 RX ADMIN — HYDROMORPHONE HYDROCHLORIDE 0.25 MG: 1 INJECTION, SOLUTION INTRAMUSCULAR; INTRAVENOUS; SUBCUTANEOUS at 14:27

## 2025-06-15 RX ADMIN — OXYCODONE HYDROCHLORIDE 2.5 MG: 5 TABLET ORAL at 12:05

## 2025-06-15 RX ADMIN — BUPROPION HYDROCHLORIDE 300 MG: 300 TABLET, EXTENDED RELEASE ORAL at 07:29

## 2025-06-15 RX ADMIN — OXYCODONE HYDROCHLORIDE AND ACETAMINOPHEN 1 TABLET: 5; 325 TABLET ORAL at 07:29

## 2025-06-15 RX ADMIN — OXYCODONE HYDROCHLORIDE 2.5 MG: 5 TABLET ORAL at 07:29

## 2025-06-15 RX ADMIN — DICYCLOMINE HYDROCHLORIDE 10 MG: 10 CAPSULE ORAL at 14:26

## 2025-06-15 RX ADMIN — OXYCODONE HYDROCHLORIDE AND ACETAMINOPHEN 1 TABLET: 5; 325 TABLET ORAL at 03:07

## 2025-06-15 RX ADMIN — SODIUM CHLORIDE, PRESERVATIVE FREE 40 MG: 5 INJECTION INTRAVENOUS at 07:30

## 2025-06-15 RX ADMIN — HYDROMORPHONE HYDROCHLORIDE 0.25 MG: 1 INJECTION, SOLUTION INTRAMUSCULAR; INTRAVENOUS; SUBCUTANEOUS at 23:44

## 2025-06-15 ASSESSMENT — PAIN DESCRIPTION - ORIENTATION
ORIENTATION: RIGHT;MID
ORIENTATION: MID;RIGHT
ORIENTATION: RIGHT
ORIENTATION: LEFT
ORIENTATION: RIGHT;UPPER
ORIENTATION: LEFT
ORIENTATION: LEFT
ORIENTATION: RIGHT
ORIENTATION: RIGHT

## 2025-06-15 ASSESSMENT — PAIN DESCRIPTION - LOCATION
LOCATION: CHEST
LOCATION: CHEST
LOCATION: CHEST;HIP
LOCATION: CHEST;HIP
LOCATION: CHEST;BACK
LOCATION: CHEST

## 2025-06-15 ASSESSMENT — PAIN DESCRIPTION - DESCRIPTORS
DESCRIPTORS: SHARP
DESCRIPTORS: ACHING
DESCRIPTORS: PRESSURE;SHARP
DESCRIPTORS: ACHING;SHARP
DESCRIPTORS: ACHING
DESCRIPTORS: SHARP
DESCRIPTORS: SHARP;PRESSURE
DESCRIPTORS: ACHING
DESCRIPTORS: BURNING;PRESSURE

## 2025-06-15 ASSESSMENT — PAIN SCALES - GENERAL
PAINLEVEL_OUTOF10: 7
PAINLEVEL_OUTOF10: 8
PAINLEVEL_OUTOF10: 7
PAINLEVEL_OUTOF10: 8
PAINLEVEL_OUTOF10: 6
PAINLEVEL_OUTOF10: 8
PAINLEVEL_OUTOF10: 6

## 2025-06-15 ASSESSMENT — PAIN - FUNCTIONAL ASSESSMENT
PAIN_FUNCTIONAL_ASSESSMENT: ACTIVITIES ARE NOT PREVENTED

## 2025-06-15 ASSESSMENT — PAIN DESCRIPTION - PAIN TYPE: TYPE: CHRONIC PAIN

## 2025-06-15 NOTE — CARE COORDINATION
Case Management   Daily Progress Note       Patient Name: Krista Chiang                   YOB: 1980  Diagnosis: Hypoglycemia [E16.2]  Community acquired bacterial pneumonia [J15.9]  Pneumonia due to infectious organism, unspecified laterality, unspecified part of lung [J18.9]                       GMLOS: 2.8 days  Length of Stay: 3  days    Readmission Risk (Low < 19, Mod (19-27), High > 27): Readmission Risk Score: 39.7      Chart Reviewed, and Current Transitional Plan is:    [] Home Independently    [x] Home with  - [facility]'s.    [] Skilled Nursing Facility    [] Acute Rehabilitation    [] Long Term Acute Care (LTAC)    [] Other:     Additional Notes:     UGI/SBFT on Monday. Regular diet.    Electronically signed by Helene Vernon RN on 6/15/2025 at 8:35 AM

## 2025-06-15 NOTE — PROGRESS NOTES
[facility] Internal Medicine  Raul Heard MD; Ezio Conway MD, Crystal Estrada MD,    Wilner Ash MD, Carli Mendoza MD.    [facility] Internal Medicine   IN-PATIENT SERVICE   [facility]    Progress note             Date:   6/15/2025  Patient name:  Krista Chiang  Date of admission:  6/12/2025  1:23 AM  MRN:   [id number]  Account:  [id number]  YOB: 1980  PCP:    Arielle Melton APRN - NP  Room:   2074/2074-01  Code Status:    Full Code    Chief Complaint:     Chief Complaint   Patient presents with    Hypoglycemia       History Obtained From:     Patient/EMR/Bedside RN    History of Present Illness:     Krista Chiang is a 44 y.o. Non- / non  female who presents with Hypoglycemia   and is admitted to the hospital for the management of Community acquired bacterial pneumonia.      Patient presented to ED per EMS for low blood sugar with glucose level 20.  Apparently she was awake enough to eat a few bites of food and drink which brought her blood sugar up to 87.  On arrival to ED she reports that she has been having increased nausea and vomiting.  States that she has not had much of an appetite but still took took 14 units of Lantus and a partial dose of sliding scale earlier in the morning.  Also reports that she had a fall around 11 AM at home.  States that she has increased left hip pain.  She is on anticoagulation for antiphospholipid syndrome.  CT imaging shows no acute fracture or injuries.  CT chest abdomen pelvis reveals right lower lobe pneumonia.  Started on IV vancomycin and IV Zosyn.  Hgb 9.7 which is patient's baseline.  Urinalysis negative for infection.     Being admitted to MedSurg unit for pneumonia and hypoglycemia.        6/13  Patient seen and examined nausea vomiting is better on clear liquid diet  Blood sugars getting better    Past Medical History:     Past Medical History:   Diagnosis Date    Alcohol

## 2025-06-15 NOTE — PLAN OF CARE
Problem: Discharge Planning  Goal: Discharge to home or other facility with appropriate resources  6/15/2025 0354 by Heidi Newell, RN  Outcome: Progressing     Problem: Pain  Goal: Verbalizes/displays adequate comfort level or baseline comfort level  6/15/2025 0354 by Heidi Newell, RN  Outcome: Progressing     Problem: Safety - Adult  Goal: Free from fall injury  6/15/2025 0354 by Heidi Newell, RN  Outcome: Progressing

## 2025-06-15 NOTE — PLAN OF CARE
Problem: Chronic Conditions and Co-morbidities  Goal: Patient's chronic conditions and co-morbidity symptoms are monitored and maintained or improved  Outcome: Progressing  Flowsheets (Taken 6/15/2025 0735 by Cassy Orantes, RN)  Care Plan - Patient's Chronic Conditions and Co-Morbidity Symptoms are Monitored and Maintained or Improved: Monitor and assess patient's chronic conditions and comorbid symptoms for stability, deterioration, or improvement     Problem: Discharge Planning  Goal: Discharge to home or other facility with appropriate resources  6/15/2025 1502 by Teagan Lee RN  Outcome: Progressing  Flowsheets (Taken 6/15/2025 0735 by Cassy Orantes, RN)  Discharge to home or other facility with appropriate resources: Identify barriers to discharge with patient and caregiver  6/15/2025 0354 by Heidi Newell RN  Outcome: Progressing     Problem: Pain  Goal: Verbalizes/displays adequate comfort level or baseline comfort level  6/15/2025 1502 by Teagan Lee RN  Outcome: Progressing  Flowsheets (Taken 6/14/2025 0418 by Kriss Pierre RN)  Verbalizes/displays adequate comfort level or baseline comfort level:   Encourage patient to monitor pain and request assistance   Administer analgesics based on type and severity of pain and evaluate response   Consider cultural and social influences on pain and pain management   Notify Licensed Independent Practitioner if interventions unsuccessful or patient reports new pain   Implement non-pharmacological measures as appropriate and evaluate response   Assess pain using appropriate pain scale  6/15/2025 0351 by Heidi Newell RN  Outcome: Progressing     Problem: Skin/Tissue Integrity  Goal: Skin integrity remains intact  Description: 1.  Monitor for areas of redness and/or skin breakdown2.  Assess vascular access sites hourly3.  Every 4-6 hours minimum:  Change oxygen saturation probe site4.  Every 4-6 hours:  If on nasal continuous positive airway

## 2025-06-15 NOTE — PROGRESS NOTES
[facility]    Gastroenterology Daily Progress Note      Patient:   Krista Chiang   :    1980   Facility:   [facility]  Date:     6/15/2025  Consultant:   BERNARDA Ryan - CNP, CNP      SUBJECTIVE  45 y.o. female admitted 2025 with Hypoglycemia [E16.2]  Community acquired bacterial pneumonia [J15.9]  Pneumonia due to infectious organism, unspecified laterality, unspecified part of lung [J18.9] and seen for .nausea and vomiting abd pain. The pt was seen and examined. C/o continued nausea, had non bloody emesis last night. C/o ruq pain.         OBJECTIVE  Scheduled Meds:   lidocaine  1 patch TransDERmal Daily    pantoprazole (PROTONIX) 40 mg in sodium chloride (PF) 0.9 % 10 mL injection  40 mg IntraVENous Daily    levoFLOXacin  750 mg Oral Q24H    buPROPion  300 mg Oral QAM    dicyclomine  10 mg Oral TID AC    FLUoxetine  40 mg Oral Daily    fondaparinux  7.5 mg SubCUTAneous Daily    [Held by provider] insulin glargine  15 Units SubCUTAneous Nightly    lipase-protease-amylase  40,000 Units Oral TID WC    insulin lispro  0-8 Units SubCUTAneous 4x Daily AC & HS    therapeutic multivitamin-minerals  1 tablet Oral Daily    pravastatin  40 mg Oral Nightly    sucralfate  1 g Oral 4x Daily    valsartan  80 mg Oral Daily    sodium chloride flush  5-40 mL IntraVENous 2 times per day       Vital Signs:  BP 90/74   Pulse 83   Temp 97.9 °F (36.6 °C) (Oral)   Resp 18   Ht 1.727 m (5' 7.99\")   Wt 81.6 kg (180 lb)   SpO2 99%   BMI 27.38 kg/m²    Review of Systems - History obtained from chart review and the patient  General ROS: negative  Respiratory ROS: no cough, shortness of breath, or wheezing  Cardiovascular ROS: no chest pain or dyspnea on exertion  Gastrointestinal ROS abdominal pain nausea vomiting  Physical Exam:     General Appearance: alert and oriented to person, place and time, well-developed and well-nourished, in no acute distress  Skin: warm and dry, no rash

## 2025-06-15 NOTE — PROGRESS NOTES
Physical Therapy  [facility]   Physical Therapy Treatment  Date: 6/15/25  Patient Name: Krista Chiang       Room: 4-  MRN: [id number]  Account: [id number]   : 1980  (45 y.o.) Gender: female     Discharge Recommendations:  Discharge Recommendations: Continue to assess pending progress     PT D/C Equipment  Other: TBD  PT Equipment Recommendations  Other: TBD    General  Patient assessed for rehabilitation services?: Yes  Additional Pertinent Hx: per H&P: Krista Chiang is a 44 y.o. Non- / non  female who presents with Hypoglycemia   and is admitted to the hospital for the management of Community acquired bacterial pneumonia.      Patient presented to ED per EMS for low blood sugar with glucose level 20.  Apparently she was awake enough to eat a few bites of food and drink which brought her blood sugar up to 87.  On arrival to ED she reports that she has been having increased nausea and vomiting.  States that she has not had much of an appetite but still took took 14 units of Lantus and a partial dose of sliding scale earlier in the morning.  Also reports that she had a fall around 11 AM at home.  States that she has increased left hip pain.  She is on anticoagulation for antiphospholipid syndrome.  CT imaging shows no acute fracture or injuries.  CT chest abdomen pelvis reveals right lower lobe pneumonia.  Started on IV vancomycin and IV Zosyn.  Hgb 9.7 which is patient's baseline.  Urinalysis negative for infection.     Being admitted to MedSurg unit for pneumonia and hypoglycemia.  Response To Previous Treatment: Not applicable  Family/Caregiver Present: No  Referring Practitioner: ALDAIR RADFORD  Referral Date : 25  Diagnosis: community acquired bacterial pneumonia  Follows Commands: Within Functional Limits  Other (Comment): ok per PEBBLES Lee to see pt for PT eval    Past Medical History:  has a past medical history of Alcohol abuse, Alcoholic

## 2025-06-15 NOTE — PROGRESS NOTES
Pt asking for a one time dose of Dilaudid for her chest (rib) and hip pain. Message sent to NP Isa Smalls. Message was read and no new orders placed as of yet.

## 2025-06-15 NOTE — PROGRESS NOTES
Patient requesting to change her thigh wound prior to wound care seeing her tomorrow morning with her home supplies. Ok with Dr. Heard.

## 2025-06-15 NOTE — PROGRESS NOTES
[facility] -   Infectious diseases evaluation  admission date 6/12/2025    reason for consultation:   Pneumonia    Impression :   Current:  Right lower lobe pneumonia suspected aspiration  Nausea, vomiting and weight loss  Allergy to Zosyn.   Itching, rash.  Right upper thigh/abdominal wounds   Suspected chronic osteomyelitis/fluid collection to the left hip.  S/P left hip hemiarthroplasty performed by Dr. Traore on 7/19/2023 after sustaining a femoral neck fracture.    S/P left hip I&D /placement of antibiotic cement beads on 8/21/2023 due to infection post hemiarthroplasty.  The patient was treated with Avycaz due to resistant Klebsiella on culture.S/P left hip Girdlestone at [facility].    DM  Alcohol abuse   Antiphospholipid syndrome   H/O bariatric surgery   H/O PE/DVT status post IVC filter  H/O MRSA/MDRO/VRE/Acinetobacter  History of C. difficile colitis      HENCE:   Levaquin 750 mg po daily for now.  Follow blood cultures  Strep pneumo, Legionella antigen negative.  Mycoplasma IgM negative  Follow CBC and renal function  Supportive care  Discussed with patient.    Infection Control Recommendations   [place] Precautions  Contact Isolation       Antimicrobial Stewardship Recommendations   Simplification of therapy  Targeted therapy      History of Present Illness:   Initial history:  Krista Chiang is a 45 y.o.-year-old female was brought to the ED by EMS for hypoglycemia with reported glucose level of 20, was able to eat some and hypoglycemia resolved.  The patient had episodes of nausea and vomiting, decreased appetite and left hip pain.  She had occasional nonproductive cough, denied shortness of breath, no other complaints.  The patient was started initially on Zosyn developed severe itching with mild to her rash that was discontinued.  CT chest, abdomen and pelvis concerning for right lower lobe pneumonia. large fluid collection extending between the  serve / office [phone]

## 2025-06-16 ENCOUNTER — APPOINTMENT (OUTPATIENT)
Dept: MRI IMAGING | Age: 45
DRG: 178 | End: 2025-06-16
Payer: MEDICARE

## 2025-06-16 LAB
GLUCOSE BLD-MCNC: 120 MG/DL (ref 65–105)
GLUCOSE BLD-MCNC: 125 MG/DL (ref 65–105)
GLUCOSE BLD-MCNC: 126 MG/DL (ref 65–105)
GLUCOSE BLD-MCNC: 344 MG/DL (ref 65–105)

## 2025-06-16 PROCEDURE — 99239 HOSP IP/OBS DSCHRG MGMT >30: CPT | Performed by: INTERNAL MEDICINE

## 2025-06-16 PROCEDURE — 6360000002 HC RX W HCPCS: Performed by: NURSE PRACTITIONER

## 2025-06-16 PROCEDURE — 99213 OFFICE O/P EST LOW 20 MIN: CPT

## 2025-06-16 PROCEDURE — APPSS30 APP SPLIT SHARED TIME 16-30 MINUTES: Performed by: NURSE PRACTITIONER

## 2025-06-16 PROCEDURE — 2500000003 HC RX 250 WO HCPCS: Performed by: NURSE PRACTITIONER

## 2025-06-16 PROCEDURE — 1200000000 HC SEMI PRIVATE

## 2025-06-16 PROCEDURE — 99232 SBSQ HOSP IP/OBS MODERATE 35: CPT | Performed by: INTERNAL MEDICINE

## 2025-06-16 PROCEDURE — 82947 ASSAY GLUCOSE BLOOD QUANT: CPT

## 2025-06-16 PROCEDURE — 6360000002 HC RX W HCPCS: Performed by: INTERNAL MEDICINE

## 2025-06-16 PROCEDURE — 74181 MRI ABDOMEN W/O CONTRAST: CPT

## 2025-06-16 PROCEDURE — 97530 THERAPEUTIC ACTIVITIES: CPT

## 2025-06-16 PROCEDURE — 6370000000 HC RX 637 (ALT 250 FOR IP): Performed by: NURSE PRACTITIONER

## 2025-06-16 PROCEDURE — 97535 SELF CARE MNGMENT TRAINING: CPT

## 2025-06-16 PROCEDURE — 6370000000 HC RX 637 (ALT 250 FOR IP): Performed by: INTERNAL MEDICINE

## 2025-06-16 RX ORDER — LORAZEPAM 2 MG/ML
0.5 INJECTION INTRAMUSCULAR ONCE
Status: COMPLETED | OUTPATIENT
Start: 2025-06-16 | End: 2025-06-16

## 2025-06-16 RX ORDER — SODIUM CHLORIDE 0.9 % (FLUSH) 0.9 %
10 SYRINGE (ML) INJECTION PRN
Status: DISCONTINUED | OUTPATIENT
Start: 2025-06-16 | End: 2025-06-16

## 2025-06-16 RX ORDER — ONDANSETRON 2 MG/ML
4 INJECTION INTRAMUSCULAR; INTRAVENOUS EVERY 6 HOURS PRN
Status: DISCONTINUED | OUTPATIENT
Start: 2025-06-16 | End: 2025-06-18 | Stop reason: HOSPADM

## 2025-06-16 RX ORDER — GADOTERIDOL 279.3 MG/ML
17 INJECTION INTRAVENOUS
Status: DISCONTINUED | OUTPATIENT
Start: 2025-06-16 | End: 2025-06-16

## 2025-06-16 RX ORDER — 0.9 % SODIUM CHLORIDE 0.9 %
35 INTRAVENOUS SOLUTION INTRAVENOUS ONCE
Status: DISCONTINUED | OUTPATIENT
Start: 2025-06-16 | End: 2025-06-16

## 2025-06-16 RX ADMIN — OXYCODONE HYDROCHLORIDE 2.5 MG: 5 TABLET ORAL at 17:31

## 2025-06-16 RX ADMIN — DICYCLOMINE HYDROCHLORIDE 10 MG: 10 CAPSULE ORAL at 11:35

## 2025-06-16 RX ADMIN — OXYCODONE HYDROCHLORIDE AND ACETAMINOPHEN 1 TABLET: 5; 325 TABLET ORAL at 07:21

## 2025-06-16 RX ADMIN — OXYCODONE HYDROCHLORIDE 2.5 MG: 5 TABLET ORAL at 21:01

## 2025-06-16 RX ADMIN — SODIUM CHLORIDE, PRESERVATIVE FREE 40 MG: 5 INJECTION INTRAVENOUS at 07:24

## 2025-06-16 RX ADMIN — SUCRALFATE 1 G: 1 TABLET ORAL at 07:23

## 2025-06-16 RX ADMIN — PRAVASTATIN SODIUM 40 MG: 40 TABLET ORAL at 20:57

## 2025-06-16 RX ADMIN — OXYCODONE HYDROCHLORIDE 2.5 MG: 5 TABLET ORAL at 03:12

## 2025-06-16 RX ADMIN — OXYCODONE HYDROCHLORIDE AND ACETAMINOPHEN 1 TABLET: 5; 325 TABLET ORAL at 03:12

## 2025-06-16 RX ADMIN — OXYCODONE HYDROCHLORIDE 2.5 MG: 5 TABLET ORAL at 07:21

## 2025-06-16 RX ADMIN — SUCRALFATE 1 G: 1 TABLET ORAL at 13:02

## 2025-06-16 RX ADMIN — DICYCLOMINE HYDROCHLORIDE 10 MG: 10 CAPSULE ORAL at 06:29

## 2025-06-16 RX ADMIN — HYDROMORPHONE HYDROCHLORIDE 0.25 MG: 1 INJECTION, SOLUTION INTRAMUSCULAR; INTRAVENOUS; SUBCUTANEOUS at 08:25

## 2025-06-16 RX ADMIN — LORAZEPAM 0.5 MG: 2 INJECTION INTRAMUSCULAR; INTRAVENOUS at 14:31

## 2025-06-16 RX ADMIN — FONDAPARINUX SODIUM 7.5 MG: 7.5 INJECTION, SOLUTION SUBCUTANEOUS at 07:34

## 2025-06-16 RX ADMIN — Medication 1 TABLET: at 07:23

## 2025-06-16 RX ADMIN — BUPROPION HYDROCHLORIDE 300 MG: 300 TABLET, EXTENDED RELEASE ORAL at 07:23

## 2025-06-16 RX ADMIN — SUCRALFATE 1 G: 1 TABLET ORAL at 20:56

## 2025-06-16 RX ADMIN — DICYCLOMINE HYDROCHLORIDE 10 MG: 10 CAPSULE ORAL at 17:31

## 2025-06-16 RX ADMIN — FLUOXETINE HYDROCHLORIDE 40 MG: 20 CAPSULE ORAL at 07:23

## 2025-06-16 RX ADMIN — PANCRELIPASE LIPASE, PANCRELIPASE PROTEASE, PANCRELIPASE AMYLASE 40000 UNITS: 20000; 63000; 84000 CAPSULE, DELAYED RELEASE ORAL at 07:26

## 2025-06-16 RX ADMIN — LEVOFLOXACIN 750 MG: 500 TABLET, FILM COATED ORAL at 08:25

## 2025-06-16 RX ADMIN — OXYCODONE HYDROCHLORIDE AND ACETAMINOPHEN 1 TABLET: 5; 325 TABLET ORAL at 21:01

## 2025-06-16 RX ADMIN — HYDROMORPHONE HYDROCHLORIDE 0.25 MG: 1 INJECTION, SOLUTION INTRAMUSCULAR; INTRAVENOUS; SUBCUTANEOUS at 16:00

## 2025-06-16 RX ADMIN — OXYCODONE HYDROCHLORIDE AND ACETAMINOPHEN 1 TABLET: 5; 325 TABLET ORAL at 11:39

## 2025-06-16 RX ADMIN — INSULIN GLARGINE 15 UNITS: 100 INJECTION, SOLUTION SUBCUTANEOUS at 20:56

## 2025-06-16 RX ADMIN — SODIUM CHLORIDE, PRESERVATIVE FREE 10 ML: 5 INJECTION INTRAVENOUS at 20:57

## 2025-06-16 RX ADMIN — INSULIN LISPRO 6 UNITS: 100 INJECTION, SOLUTION INTRAVENOUS; SUBCUTANEOUS at 20:56

## 2025-06-16 RX ADMIN — VALSARTAN 40 MG: 80 TABLET, FILM COATED ORAL at 07:25

## 2025-06-16 RX ADMIN — OXYCODONE HYDROCHLORIDE 2.5 MG: 5 TABLET ORAL at 11:39

## 2025-06-16 RX ADMIN — SODIUM CHLORIDE, PRESERVATIVE FREE 10 ML: 5 INJECTION INTRAVENOUS at 07:25

## 2025-06-16 RX ADMIN — OXYCODONE HYDROCHLORIDE AND ACETAMINOPHEN 1 TABLET: 5; 325 TABLET ORAL at 17:30

## 2025-06-16 RX ADMIN — PANCRELIPASE LIPASE, PANCRELIPASE PROTEASE, PANCRELIPASE AMYLASE 40000 UNITS: 20000; 63000; 84000 CAPSULE, DELAYED RELEASE ORAL at 17:33

## 2025-06-16 RX ADMIN — SUCRALFATE 1 G: 1 TABLET ORAL at 17:31

## 2025-06-16 ASSESSMENT — PAIN DESCRIPTION - ORIENTATION
ORIENTATION: RIGHT
ORIENTATION: LEFT
ORIENTATION: RIGHT

## 2025-06-16 ASSESSMENT — PAIN SCALES - GENERAL
PAINLEVEL_OUTOF10: 7
PAINLEVEL_OUTOF10: 8
PAINLEVEL_OUTOF10: 7
PAINLEVEL_OUTOF10: 6
PAINLEVEL_OUTOF10: 4
PAINLEVEL_OUTOF10: 4
PAINLEVEL_OUTOF10: 6
PAINLEVEL_OUTOF10: 7
PAINLEVEL_OUTOF10: 6
PAINLEVEL_OUTOF10: 5
PAINLEVEL_OUTOF10: 7
PAINLEVEL_OUTOF10: 8
PAINLEVEL_OUTOF10: 7
PAINLEVEL_OUTOF10: 4
PAINLEVEL_OUTOF10: 4

## 2025-06-16 ASSESSMENT — PAIN DESCRIPTION - DESCRIPTORS
DESCRIPTORS: SHARP
DESCRIPTORS: ACHING;DISCOMFORT
DESCRIPTORS: SHARP
DESCRIPTORS: SHARP

## 2025-06-16 ASSESSMENT — ENCOUNTER SYMPTOMS
COUGH: 0
SHORTNESS OF BREATH: 0

## 2025-06-16 ASSESSMENT — PAIN - FUNCTIONAL ASSESSMENT: PAIN_FUNCTIONAL_ASSESSMENT: ACTIVITIES ARE NOT PREVENTED

## 2025-06-16 ASSESSMENT — PAIN DESCRIPTION - LOCATION
LOCATION: CHEST
LOCATION: HIP
LOCATION: ABDOMEN
LOCATION: CHEST

## 2025-06-16 NOTE — PROGRESS NOTES
[facility]  Progress Note    Krista Chiang  MRN: [id number]  Date: 6/16/2025  LOS: 4     Reason for Consultation/Summary of Stay:   Pneumonia     Assessment :   Right lower lobe pneumonia suspected aspiration  Nausea, vomiting and weight loss  Allergy to Zosyn.   Itching, rash.  Right upper thigh/abdominal wounds   Suspected chronic osteomyelitis/fluid collection to the left hip.  S/P left hip hemiarthroplasty performed by Dr. Traore on 7/19/2023 after sustaining a femoral neck fracture.    S/P left hip I&D /placement of antibiotic cement beads on 8/21/2023 due to infection post hemiarthroplasty.  The patient was treated with Avycaz due to resistant Klebsiella on culture.S/P left hip Girdlestone at [facility].    DM  Alcohol abuse   Antiphospholipid syndrome   H/O bariatric surgery   H/O PE/DVT status post IVC filter  H/O MRSA/MDRO/VRE/Acinetobacter  History of C. difficile colitis    Plan:     Levaquin 750 mg oral daily, on day 4  Had one day of Zosyn and developed hives  Follow blood cultures  Strep pneumo, Legionella antigen negative.  Mycoplasma IgM negative  Follow CBC and renal function  Supportive care  Plan discussed with Dr. Flannery    Infection Control Recommendations:     [place] Precautions  Contact Isolation     Antimicrobial Stewardship Recommendations:   Simplification of therapy  Targeted therapy    History of Present Illness:   Krista Chiang is a 45 y.o.-year-old female was brought to the ED by EMS for hypoglycemia with reported glucose level of 20, was able to eat some and hypoglycemia resolved.  The patient had episodes of nausea and vomiting, decreased appetite and left hip pain.  She had occasional nonproductive cough, denied shortness of breath, no other complaints.  The patient was started initially on Zosyn developed severe itching with mild to her rash that was discontinued.  CT chest, abdomen and pelvis concerning for right lower lobe pneumonia. large fluid collection

## 2025-06-16 NOTE — PROGRESS NOTES
Comprehensive Nutrition Assessment    Type and Reason for Visit:  Reassess    Nutrition Recommendations/Plan:   Continue current diet with double portions of protein on all trays.    Continue Ensure Clear 3x/daily.      Malnutrition Assessment:  Malnutrition Status:  Moderate malnutrition (06/12/25 1047)    Context:  Acute Illness     Findings of the 6 clinical characteristics of malnutrition:  Energy Intake:  Mild decrease in energy intake (due to emesis.)  Weight Loss:  Mild weight loss (36% wt loss in~ 10mon.)     Body Fat Loss:  No body fat loss     Muscle Mass Loss:  Mild muscle mass loss Temples (temporalis)  Fluid Accumulation:  No fluid accumulation     Strength:  Not Performed    Nutrition Assessment:    Patient reports poor appetite. She states she ate 25% of her breakfast and is drinking % of the nutrition supplements. She reports still experiencing frequent nausea and vomiting. Pt requested that her meal trays consist of double protein portions. She says she has been ordering them, but not receiving it on her meal trays. Will follow up on accuracy of meal trays. No other issues reported.    Nutrition Related Findings:    Edema: +1 BUE. Labs: Glucose 125. BM 6/12 with noted nausea. Active bowel sounds x4. Wound Type: None       Current Nutrition Intake & Therapies:    Average Meal Intake: 1-25%  Average Supplements Intake: %  Diet NPO    Anthropometric Measures:  Height: 172.7 cm (5' 7.99\")  Ideal Body Weight (IBW): 140 lbs (64 kg)    Admission Body Weight: 81.6 kg (179 lb 14.3 oz)  Current Body Weight: 81.6 kg (179 lb 14.3 oz), 128.5 % IBW. Weight Source: Not specified  Current BMI (kg/m2): 27.4  Usual Body Weight: 95.3 kg (210 lb 1.6 oz)     % Weight Change (Calculated): -14.4                    BMI Categories: Overweight (BMI 25.0-29.9)    Estimated Daily Nutrient Needs:  Energy Requirements Based On: Formula  Weight Used for Energy Requirements: Admission  Energy (kcal/day):

## 2025-06-16 NOTE — PROGRESS NOTES
Notified Dr Estrada of lab failed attempts to draw x (3) phlebs.    Ok to hold labs per Dr Estrada.

## 2025-06-16 NOTE — PROGRESS NOTES
[facility]    Gastroenterology Daily Progress Note      Patient:   Krista Chiang   :    1980   Facility:   [facility]  Date:     2025  Consultant:   BERNARDA Ryan CNP, CNP      SUBJECTIVE  45 y.o. female admitted 2025 with Hypoglycemia [E16.2]  Community acquired bacterial pneumonia [J15.9]  Pneumonia due to infectious organism, unspecified laterality, unspecified part of lung [J18.9] and seen for abdominal pain with nausea, vomiting. The pt was seen and examined. Still c/o nausea with ruq abdominal pain and non bloody emesis. .        OBJECTIVE  Scheduled Meds:   valsartan  40 mg Oral Daily    insulin glargine  15 Units SubCUTAneous Nightly    lidocaine  1 patch TransDERmal Daily    pantoprazole (PROTONIX) 40 mg in sodium chloride (PF) 0.9 % 10 mL injection  40 mg IntraVENous Daily    levoFLOXacin  750 mg Oral Q24H    buPROPion  300 mg Oral QAM    dicyclomine  10 mg Oral TID AC    FLUoxetine  40 mg Oral Daily    fondaparinux  7.5 mg SubCUTAneous Daily    lipase-protease-amylase  40,000 Units Oral TID WC    insulin lispro  0-8 Units SubCUTAneous 4x Daily AC & HS    therapeutic multivitamin-minerals  1 tablet Oral Daily    pravastatin  40 mg Oral Nightly    sucralfate  1 g Oral 4x Daily    sodium chloride flush  5-40 mL IntraVENous 2 times per day       Vital Signs:  /67   Pulse 83   Temp 98.4 °F (36.9 °C)   Resp 18   Ht 1.727 m (5' 7.99\")   Wt 81.6 kg (180 lb)   SpO2 98%   BMI 27.38 kg/m²    Review of Systems - History obtained from chart review and the patient  General ROS: negative  Respiratory ROS: no cough, shortness of breath, or wheezing  Cardiovascular ROS: no chest pain or dyspnea on exertion  Gastrointestinal ROS abdominal pain nausea vomiting  Physical Exam:     General Appearance: alert and oriented to person, place and time, well-developed and well-nourished, in no acute distress  Skin: warm and dry, no rash or erythema  Head:  I have made necessary changes as deemed appropriate directly in the note.    MRI/MRCP reviewed - no CBD stone, stable CBD dilation (cholecystectomy?)  Pancreatic tail lesion, will repeat imaging in 1-2 years for f/u  Awaiting results of UGI w/ small bowel follow through today     More than 50% of the time was spent taking care of this patient in addition to the nurse practitioner time.  That also included history taking follow-up physical examination and review of system.    Electronically signed by Yuli Bernabe MD

## 2025-06-16 NOTE — PLAN OF CARE
Problem: Chronic Conditions and Co-morbidities  Goal: Patient's chronic conditions and co-morbidity symptoms are monitored and maintained or improved  6/16/2025 1807 by Camille Monsalve RN  Outcome: Progressing  6/16/2025 0531 by Krista Jolly RN  Outcome: Progressing  Flowsheets (Taken 6/16/2025 0531)  Care Plan - Patient's Chronic Conditions and Co-Morbidity Symptoms are Monitored and Maintained or Improved: Monitor and assess patient's chronic conditions and comorbid symptoms for stability, deterioration, or improvement     Problem: Discharge Planning  Goal: Discharge to home or other facility with appropriate resources  6/16/2025 1807 by Camille Monsalve RN  Outcome: Progressing  6/16/2025 0531 by Krista Jolly RN  Outcome: Progressing  Flowsheets (Taken 6/16/2025 0531)  Discharge to home or other facility with appropriate resources: Identify barriers to discharge with patient and caregiver     Problem: Pain  Goal: Verbalizes/displays adequate comfort level or baseline comfort level  6/16/2025 1807 by Camille Monsalve RN  Outcome: Progressing  6/16/2025 0531 by Krista Jolly RN  Outcome: Progressing  Flowsheets (Taken 6/16/2025 0531)  Verbalizes/displays adequate comfort level or baseline comfort level:   Encourage patient to monitor pain and request assistance   Assess pain using appropriate pain scale   Administer analgesics based on type and severity of pain and evaluate response   Implement non-pharmacological measures as appropriate and evaluate response   Consider cultural and social influences on pain and pain management   Notify Licensed Independent Practitioner if interventions unsuccessful or patient reports new pain     Problem: Skin/Tissue Integrity  Goal: Skin integrity remains intact  Description: 1.  Monitor for areas of redness and/or skin breakdown2.  Assess vascular access sites hourly3.  Every 4-6 hours minimum:  Change oxygen saturation probe  site4.  Every 4-6 hours:  If on nasal continuous positive airway pressure, respiratory therapy assess nares and determine need for appliance change or resting period  6/16/2025 1807 by Camille Monsalve RN  Outcome: Progressing  6/16/2025 0531 by Krista Jolly RN  Outcome: Progressing  Flowsheets (Taken 6/16/2025 0531)  Skin Integrity Remains Intact: Monitor for areas of redness and/or skin breakdown     Problem: Safety - Adult  Goal: Free from fall injury  6/16/2025 1807 by Camille Monsalve RN  Outcome: Progressing  6/16/2025 0531 by Krista Jolly RN  Outcome: Progressing  Flowsheets (Taken 6/16/2025 0531)  Free From Fall Injury: Instruct family/caregiver on patient safety     Problem: ABCDS Injury Assessment  Goal: Absence of physical injury  6/16/2025 1807 by Camille Monsalve RN  Outcome: Progressing  6/16/2025 0531 by Krista Jolly RN  Outcome: Progressing  Flowsheets (Taken 6/16/2025 0531)  Absence of Physical Injury: Implement safety measures based on patient assessment     Problem: Nutrition Deficit:  Goal: Optimize nutritional status  6/16/2025 1807 by Camille Monsalve RN  Outcome: Progressing  Flowsheets (Taken 6/16/2025 1143 by Shreya Anders)  Nutrient intake appropriate for improving, restoring, or maintaining nutritional needs: Monitor oral intake, labs, and treatment plans  6/16/2025 0531 by Krista Jolly RN  Outcome: Progressing  Flowsheets (Taken 6/16/2025 0531)  Nutrient intake appropriate for improving, restoring, or maintaining nutritional needs: Monitor oral intake, labs, and treatment plans

## 2025-06-16 NOTE — PROGRESS NOTES
Physical Therapy        Physical Therapy Cancel Note      DATE: 2025    NAME: Krista Chaing  MRN: [id number]   : 1980      Patient not seen this date for Physical Therapy due to:    Patient Declined: Attempted to see pt at 1435. Pt is resting in bed, has been NPO all day and is going to have procedure shortly per RN. Pt at this time declining therapy after having OT earlier in day, and would like to just rest before going down for procedure. Will continue to follow for PT needs and attempt again tomorrow.       Electronically signed by Meri Shaw PTA on 2025 at 3:13 PM

## 2025-06-16 NOTE — PROGRESS NOTES
[facility] Internal [facility]  Raul Heard MD; Ezio Conway MD, Crystal Estrada MD,    Wilner Ash MD, Carli Mendoza MD.    [facility] Internal Medicine   IN-PATIENT SERVICE   [facility]    Progress note             Date:   6/16/2025  Patient name:  Krista Chiang  Date of admission:  6/12/2025  1:23 AM  MRN:   [id number]  Account:  [id number]  YOB: 1980  PCP:    Arielle Melton APRN - NP  Room:   2074/2074-01  Code Status:    Full Code    Chief Complaint:     Chief Complaint   Patient presents with    Hypoglycemia       History Obtained From:     Patient/EMR/Bedside RN    History of Present Illness:     Krista Chiang is a 44 y.o. Non- / non  female who presents with Hypoglycemia   and is admitted to the hospital for the management of Community acquired bacterial pneumonia.      Patient presented to ED per EMS for low blood sugar with glucose level 20.  Apparently she was awake enough to eat a few bites of food and drink which brought her blood sugar up to 87.  On arrival to ED she reports that she has been having increased nausea and vomiting.  States that she has not had much of an appetite but still took took 14 units of Lantus and a partial dose of sliding scale earlier in the morning.  Also reports that she had a fall around 11 AM at home.  States that she has increased left hip pain.  She is on anticoagulation for antiphospholipid syndrome.  CT imaging shows no acute fracture or injuries.  CT chest abdomen pelvis reveals right lower lobe pneumonia.  Started on IV vancomycin and IV Zosyn.  Hgb 9.7 which is patient's baseline.  Urinalysis negative for infection.     Being admitted to MedSurg unit for pneumonia and hypoglycemia.        6/13  Patient seen and examined nausea vomiting is better on clear liquid diet  Blood sugars getting better    Past Medical History:     Past Medical History:   Diagnosis Date    Alcohol  IRRIGATION AND DEBRIDEMENT AND PLACEMENT OF ANTIBIOTIC IMPREGNATED CEMENT BEADS performed by Dipak Traore DO at [facility] OR    ANKLE FRACTURE SURGERY Left 10/24/2023    IRRIGATION AND DEBRIDEMENT OF LEFT HIP WITH CLOSURE performed by Dipak Traore DO at [facility] OR    ANKLE SURGERY Left 2019    ligament    ANKLE SURGERY Left 2023    IRRIGATION AND DEBRIDEMENT HIP (IRRISEPT, CELLERATE) performed by Dipak Traore DO at [facility] OR    BARIATRIC SURGERY  2013    [facility]     SECTION      CHOLECYSTECTOMY      DILATION AND CURETTAGE OF UTERUS      EP DEVICE PROCEDURE N/A 2025    el vernon / Ablation SVT / no anes / julia performed by Sherman Rucker MD at [facility] CARDIAC CATH LAB    ESOPHAGOGASTRODUODENOSCOPY  2021    ESOPHAGOGASTRODUODENOSCOPY  2013    FINGER AMPUTATION Left 2015    index and ring finger. from Replaced by [facility] CATH POWER PICC SINGLE  2023    HIP SURGERY Left     2023 HIP HEMIARTHROPLASTY performed by Dipak Traore DO at [facility] OR    HIP SURGERY Left 2023    DEPUY SPACER HIP EXPLANT, REPLACE DEPUY SPACER HIP (SYNTHES DEPUY EXTRACTION SET, LATERAL ON BEAN BAG) performed by Dipak Traore DO at [facility] OR    HIP SURGERY Left 2023    HIP SURGICAL SITE WOUND (DEHISCENCE) IRRIGATION AND DEBRIDEMENT    HIP SURGERY Left 2023    HIP SURGICAL SITE WOUND (DEHISCENCE) IRRIGATION AND DEBRIDEMENT performed by Dipak Traore DO at [facility] OR    HIP SURGERY Left     IRRIGATION AND DEBRIDEMENT HIP (IRRISEPT, CELLERATE) - Left    IVC FILTER INSERTION      LAPAROSCOPY  2013    [facility]    LIVER RESECTION      for hepatic adenoma    LYMPH NODE BIOPSY      JUSTINE AND BSO (CERVIX REMOVED)      TONSILLECTOMY      UPPER GASTROINTESTINAL ENDOSCOPY N/A 2024    ESOPHAGOGASTRODUODENOSCOPY BIOPSY performed by Janet Marques MD at [facility] ENDO    UPPER GASTROINTESTINAL ENDOSCOPY N/A 2025

## 2025-06-16 NOTE — CONSULTS
[facility]  Consult Note       NAME:  Krista Chiang  MEDICAL RECORD NUMBER:  [id number]  AGE: 45 y.o.   GENDER: female  : 1980  TODAY'S DATE:  2025    Subjective:      Krista Chiang is a 45 y.o. female with inpatient referral to [facility] for:  Abdomen, right medial thigh      Wound Identification:  Wound Type: undetermined  Contributing Factors: diabetes and poor glucose control    Wound History: Wounds present since 2025, patient following with [facility] as outpatient  Current Wound Care Treatment:  [facility]    Patient Goal of Care:  [x] Wound Healing  [] Odor Control  [] Palliative Care  [] Pain Control   [] Other:         PAST MEDICAL HISTORY        Diagnosis Date    Alcohol abuse     Recurrent episodes of withdrawal    Alcoholic hepatitis without ascites (HCC)     Antiphospholipid syndrome     hypercoagulable state    Anxiety 2013    Arthritis 2013    Painting esophagus 2021    Bipolar 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 2015    Fibromyalgia     Genital herpes, unspecified     GERD (gastroesophageal reflux disease)     H/O bariatric surgery 2013    [facility]    History of pulmonary embolism     Hx of blood clots     clots in both legs and lungs     Hypertension     Lives in nursing home 2023    [facility] :  # [phone] , fax # [phone]    Lumbosacral spondylosis without myelopathy     MDRO (multiple drug resistant organisms) resistance     MRSA (abd)    Mobility impaired     wheelchair, uses a walker and pivots on right foot    MRSA (methicillin resistant staph aureus) culture positive 02/10/2017    urine    Muscle weakness     Obsessive-compulsive disorder, unspecified     Opioid abuse, in remission (HCC)     Pernicious anemia      measurement R74.8    SVT (supraventricular tachycardia) I47.10    Anxiety F41.9    Chronic idiopathic constipation K59.04    Recurrent incisional hernia K43.2    History of small bowel obstruction Z87.19    History of peptic ulcer Z87.11    Fibromyalgia M79.7    Polyneuropathy G62.9    COVID-19 virus infection U07.1    Acute pancreatitis K85.90    Pneumonia due to infectious organism J18.9    Alcoholic hepatitis ([facility]) K70.10    Acute alcoholic intoxication without complication F10.920    Acute alcohol intoxication with alcoholism, uncomplicated ([facility]) F10.220    Alcohol withdrawal syndrome with complication ([facility]) F10.939    Accident due to mechanical fall without injury W19.XXXA    Thoracic back pain M54.6    Floaters in visual field, right H43.391    Hyperglycemia R73.9    Bipolar 1 disorder, mixed, severe ([facility]) F31.63    Diabetes mellitus ([facility]) E11.9    Hypocalcemia E83.51    Elevated LFTs R79.89    Diabetic acidosis without coma ([facility]) E11.10    Arrhythmia I49.9    Mild malnutrition E44.1    Alcohol withdrawal syndrome without complication ([facility]) F10.930    Opioid overdose ([facility]) T40.2X1A    Severe hypoxic-ischemic encephalopathy ([facility]) P91.63    Cognitive impairment R41.89    SAH (subarachnoid hemorrhage) ([facility]) I60.9    Frequent falls R29.6    Syncope and collapse R55    Metabolic acidosis, increased anion gap E87.29    Bipolar depression ([facility]) F31.9    Closed fracture of left hip ([facility]) S72.002A    Abscess of left hip L02.416    Surgical wound infection T81.49XA    Staph aureus infection A49.01    Anemia D64.9    Hyponatremia E87.1    Surgical site infection T81.49XA    Post-op pain G89.18    Bipolar 1 disorder ([facility]) F31.9    Postoperative wound infection of left hip T81.49XA    S/P total left hip arthroplasty Z96.642    Prosthetic joint infection T84.50XA    MSSA (methicillin susceptible Staphylococcus aureus) infection A49.01    Prosthetic hip infection, initial encounter T84.59XA, Z96.649    Left hip pain M25.552

## 2025-06-16 NOTE — PLAN OF CARE
Problem: Chronic Conditions and Co-morbidities  Goal: Patient's chronic conditions and co-morbidity symptoms are monitored and maintained or improved  Outcome: Progressing  Flowsheets (Taken 6/16/2025 0531)  Care Plan - Patient's Chronic Conditions and Co-Morbidity Symptoms are Monitored and Maintained or Improved: Monitor and assess patient's chronic conditions and comorbid symptoms for stability, deterioration, or improvement     Problem: Discharge Planning  Goal: Discharge to home or other facility with appropriate resources  Outcome: Progressing  Flowsheets (Taken 6/16/2025 0531)  Discharge to home or other facility with appropriate resources: Identify barriers to discharge with patient and caregiver     Problem: Pain  Goal: Verbalizes/displays adequate comfort level or baseline comfort level  Outcome: Progressing  Flowsheets (Taken 6/16/2025 0531)  Verbalizes/displays adequate comfort level or baseline comfort level:   Encourage patient to monitor pain and request assistance   Assess pain using appropriate pain scale   Administer analgesics based on type and severity of pain and evaluate response   Implement non-pharmacological measures as appropriate and evaluate response   Consider cultural and social influences on pain and pain management   Notify Licensed Independent Practitioner if interventions unsuccessful or patient reports new pain     Problem: Skin/Tissue Integrity  Goal: Skin integrity remains intact  Description: 1.  Monitor for areas of redness and/or skin breakdown2.  Assess vascular access sites hourly3.  Every 4-6 hours minimum:  Change oxygen saturation probe site4.  Every 4-6 hours:  If on nasal continuous positive airway pressure, respiratory therapy assess nares and determine need for appliance change or resting period  Outcome: Progressing  Flowsheets (Taken 6/16/2025 0531)  Skin Integrity Remains Intact: Monitor for areas of redness and/or skin breakdown     Problem: Safety -  Adult  Goal: Free from fall injury  Outcome: Progressing  Flowsheets (Taken 6/16/2025 0531)  Free From Fall Injury: Instruct family/caregiver on patient safety     Problem: ABCDS Injury Assessment  Goal: Absence of physical injury  Outcome: Progressing  Flowsheets (Taken 6/16/2025 0531)  Absence of Physical Injury: Implement safety measures based on patient assessment     Problem: Nutrition Deficit:  Goal: Optimize nutritional status  Outcome: Progressing  Flowsheets (Taken 6/16/2025 0531)  Nutrient intake appropriate for improving, restoring, or maintaining nutritional needs: Monitor oral intake, labs, and treatment plans

## 2025-06-16 NOTE — PROGRESS NOTES
Phlebotomy had (3) people unsuccessfully attempt to draw blood.  GI NP was notified.  Labs to be cancelled per MAURICIO Velasquez NP.

## 2025-06-16 NOTE — CARE COORDINATION
Case Management   Daily Progress Note       Patient Name: Krista Chiang                   YOB: 1980  Diagnosis: Hypoglycemia [E16.2]  Community acquired bacterial pneumonia [J15.9]  Pneumonia due to infectious organism, unspecified laterality, unspecified part of lung [J18.9]                       GMLOS: 2.8 days  Length of Stay: 4  days    Readmission Risk (Low < 19, Mod (19-27), High > 27): Readmission Risk Score: 38.6      Patient is alert and oriented.    Spoke with Patient, and Current Transitional Plan is:    [] Home Independently    [x] Home with [facility]'s    [] Skilled Nursing Facility    [] Acute Rehabilitation    [] Long Term Acute Care (LTAC)    [] Other:     Testing Ordered: Upper GI/SBFT today at 3pm.    Additional Notes: anticipate d/c tomorrow.    Electronically signed by Helene Vernon RN on 6/16/2025 at 12:32 PM

## 2025-06-17 ENCOUNTER — APPOINTMENT (OUTPATIENT)
Dept: GENERAL RADIOLOGY | Age: 45
DRG: 178 | End: 2025-06-17
Payer: MEDICARE

## 2025-06-17 PROBLEM — K86.2 PANCREATIC CYST: Status: ACTIVE | Noted: 2025-06-17

## 2025-06-17 LAB
CANCER AG19-9 SERPL IA-ACNC: <2 U/ML (ref 0–35)
CEA SERPL-MCNC: 12.1 NG/ML (ref 0–3.8)
GLUCOSE BLD-MCNC: 120 MG/DL (ref 65–105)
GLUCOSE BLD-MCNC: 140 MG/DL (ref 65–105)
GLUCOSE BLD-MCNC: 151 MG/DL (ref 65–105)
GLUCOSE BLD-MCNC: 275 MG/DL (ref 65–105)
MICROORGANISM SPEC CULT: NORMAL
MICROORGANISM SPEC CULT: NORMAL
SERVICE CMNT-IMP: NORMAL
SERVICE CMNT-IMP: NORMAL
SPECIMEN DESCRIPTION: NORMAL
SPECIMEN DESCRIPTION: NORMAL

## 2025-06-17 PROCEDURE — 1200000000 HC SEMI PRIVATE

## 2025-06-17 PROCEDURE — 2500000003 HC RX 250 WO HCPCS: Performed by: NURSE PRACTITIONER

## 2025-06-17 PROCEDURE — 86301 IMMUNOASSAY TUMOR CA 19-9: CPT

## 2025-06-17 PROCEDURE — 82378 CARCINOEMBRYONIC ANTIGEN: CPT

## 2025-06-17 PROCEDURE — 6360000002 HC RX W HCPCS: Performed by: INTERNAL MEDICINE

## 2025-06-17 PROCEDURE — 82947 ASSAY GLUCOSE BLOOD QUANT: CPT

## 2025-06-17 PROCEDURE — 74248 X-RAY SM INT F-THRU STD: CPT

## 2025-06-17 PROCEDURE — 99232 SBSQ HOSP IP/OBS MODERATE 35: CPT | Performed by: INTERNAL MEDICINE

## 2025-06-17 PROCEDURE — 6360000002 HC RX W HCPCS: Performed by: NURSE PRACTITIONER

## 2025-06-17 PROCEDURE — 6370000000 HC RX 637 (ALT 250 FOR IP): Performed by: INTERNAL MEDICINE

## 2025-06-17 PROCEDURE — 97116 GAIT TRAINING THERAPY: CPT

## 2025-06-17 PROCEDURE — 6370000000 HC RX 637 (ALT 250 FOR IP): Performed by: NURSE PRACTITIONER

## 2025-06-17 PROCEDURE — 36415 COLL VENOUS BLD VENIPUNCTURE: CPT

## 2025-06-17 RX ORDER — SUCRALFATE 1 G/1
1 TABLET ORAL 4 TIMES DAILY
Qty: 120 TABLET | Refills: 3 | Status: SHIPPED | OUTPATIENT
Start: 2025-06-17

## 2025-06-17 RX ORDER — LANSOPRAZOLE 30 MG/1
30 CAPSULE, DELAYED RELEASE ORAL DAILY
Qty: 30 CAPSULE | Refills: 3 | Status: SHIPPED | OUTPATIENT
Start: 2025-06-17

## 2025-06-17 RX ORDER — LEVOFLOXACIN 750 MG/1
750 TABLET, FILM COATED ORAL EVERY 24 HOURS
Qty: 2 TABLET | Refills: 0 | Status: ON HOLD | OUTPATIENT
Start: 2025-06-18 | End: 2025-06-22 | Stop reason: HOSPADM

## 2025-06-17 RX ADMIN — INSULIN GLARGINE 15 UNITS: 100 INJECTION, SOLUTION SUBCUTANEOUS at 22:02

## 2025-06-17 RX ADMIN — HYDROMORPHONE HYDROCHLORIDE 0.25 MG: 1 INJECTION, SOLUTION INTRAMUSCULAR; INTRAVENOUS; SUBCUTANEOUS at 08:48

## 2025-06-17 RX ADMIN — SODIUM CHLORIDE, PRESERVATIVE FREE 40 MG: 5 INJECTION INTRAVENOUS at 08:48

## 2025-06-17 RX ADMIN — SUCRALFATE 1 G: 1 TABLET ORAL at 18:04

## 2025-06-17 RX ADMIN — PRAVASTATIN SODIUM 40 MG: 40 TABLET ORAL at 20:08

## 2025-06-17 RX ADMIN — DICYCLOMINE HYDROCHLORIDE 10 MG: 10 CAPSULE ORAL at 15:22

## 2025-06-17 RX ADMIN — TIZANIDINE 4 MG: 4 TABLET ORAL at 22:03

## 2025-06-17 RX ADMIN — OXYCODONE HYDROCHLORIDE AND ACETAMINOPHEN 1 TABLET: 5; 325 TABLET ORAL at 20:08

## 2025-06-17 RX ADMIN — BARIUM SULFATE 176 G: 960 POWDER, FOR SUSPENSION ORAL at 12:10

## 2025-06-17 RX ADMIN — OXYCODONE HYDROCHLORIDE AND ACETAMINOPHEN 1 TABLET: 5; 325 TABLET ORAL at 15:24

## 2025-06-17 RX ADMIN — PANCRELIPASE LIPASE, PANCRELIPASE PROTEASE, PANCRELIPASE AMYLASE 40000 UNITS: 20000; 63000; 84000 CAPSULE, DELAYED RELEASE ORAL at 18:04

## 2025-06-17 RX ADMIN — SODIUM CHLORIDE, PRESERVATIVE FREE 10 ML: 5 INJECTION INTRAVENOUS at 20:10

## 2025-06-17 RX ADMIN — OXYCODONE HYDROCHLORIDE 2.5 MG: 5 TABLET ORAL at 20:08

## 2025-06-17 RX ADMIN — OXYCODONE HYDROCHLORIDE AND ACETAMINOPHEN 1 TABLET: 5; 325 TABLET ORAL at 01:42

## 2025-06-17 RX ADMIN — BARIUM SULFATE 140 ML: 980 POWDER, FOR SUSPENSION ORAL at 12:09

## 2025-06-17 RX ADMIN — SODIUM CHLORIDE, PRESERVATIVE FREE 10 ML: 5 INJECTION INTRAVENOUS at 08:54

## 2025-06-17 RX ADMIN — INSULIN LISPRO 4 UNITS: 100 INJECTION, SOLUTION INTRAVENOUS; SUBCUTANEOUS at 22:02

## 2025-06-17 RX ADMIN — OXYCODONE HYDROCHLORIDE 2.5 MG: 5 TABLET ORAL at 01:42

## 2025-06-17 RX ADMIN — SUCRALFATE 1 G: 1 TABLET ORAL at 15:23

## 2025-06-17 RX ADMIN — SUCRALFATE 1 G: 1 TABLET ORAL at 20:08

## 2025-06-17 RX ADMIN — HYDROMORPHONE HYDROCHLORIDE 0.25 MG: 1 INJECTION, SOLUTION INTRAMUSCULAR; INTRAVENOUS; SUBCUTANEOUS at 00:16

## 2025-06-17 RX ADMIN — OXYCODONE HYDROCHLORIDE 2.5 MG: 5 TABLET ORAL at 15:24

## 2025-06-17 ASSESSMENT — PAIN DESCRIPTION - DESCRIPTORS
DESCRIPTORS: SHARP;PRESSURE
DESCRIPTORS: ACHING;DISCOMFORT
DESCRIPTORS: ACHING;DISCOMFORT
DESCRIPTORS: SHARP;PRESSURE
DESCRIPTORS: SHARP;STABBING;THROBBING
DESCRIPTORS: STABBING;THROBBING;SHARP
DESCRIPTORS: PRESSURE;SHARP

## 2025-06-17 ASSESSMENT — ENCOUNTER SYMPTOMS
SHORTNESS OF BREATH: 0
COUGH: 0

## 2025-06-17 ASSESSMENT — PAIN DESCRIPTION - ORIENTATION
ORIENTATION: LEFT
ORIENTATION: RIGHT
ORIENTATION: LEFT
ORIENTATION: RIGHT

## 2025-06-17 ASSESSMENT — PAIN SCALES - GENERAL
PAINLEVEL_OUTOF10: 7
PAINLEVEL_OUTOF10: 4
PAINLEVEL_OUTOF10: 9
PAINLEVEL_OUTOF10: 9
PAINLEVEL_OUTOF10: 7

## 2025-06-17 ASSESSMENT — PAIN DESCRIPTION - LOCATION
LOCATION: HIP;CHEST
LOCATION: CHEST;HIP
LOCATION: HIP
LOCATION: CHEST;LEG
LOCATION: HIP
LOCATION: CHEST;BACK
LOCATION: CHEST;RIB CAGE;LEG

## 2025-06-17 NOTE — PLAN OF CARE
Problem: Chronic Conditions and Co-morbidities  Goal: Patient's chronic conditions and co-morbidity symptoms are monitored and maintained or improved  6/17/2025 0126 by Rachel Kang RN  Outcome: Progressing  Flowsheets (Taken 6/16/2025 2057)  Care Plan - Patient's Chronic Conditions and Co-Morbidity Symptoms are Monitored and Maintained or Improved: Monitor and assess patient's chronic conditions and comorbid symptoms for stability, deterioration, or improvement     Problem: Discharge Planning  Goal: Discharge to home or other facility with appropriate resources  6/17/2025 0126 by Rachel Kang RN  Outcome: Progressing  Flowsheets (Taken 6/16/2025 2057)  Discharge to home or other facility with appropriate resources: Identify barriers to discharge with patient and caregiver     Problem: Pain  Goal: Verbalizes/displays adequate comfort level or baseline comfort level  6/17/2025 0126 by Rachel Kang RN  Outcome: Progressing     Problem: Discharge Planning  Goal: Discharge to home or other facility with appropriate resources  6/17/2025 0126 by Rachel Kang RN  Outcome: Progressing  Flowsheets (Taken 6/16/2025 2057)  Discharge to home or other facility with appropriate resources: Identify barriers to discharge with patient and caregiver     Problem: Skin/Tissue Integrity  Goal: Skin integrity remains intact  Description: 1.  Monitor for areas of redness and/or skin breakdown2.  Assess vascular access sites hourly3.  Every 4-6 hours minimum:  Change oxygen saturation probe site4.  Every 4-6 hours:  If on nasal continuous positive airway pressure, respiratory therapy assess nares and determine need for appliance change or resting period  6/17/2025 0126 by Rachel Kang RN  Outcome: Progressing  Flowsheets  Taken 6/17/2025 0126  Skin Integrity Remains Intact: Monitor for areas of redness and/or skin breakdown  Taken 6/16/2025 2057  Skin Integrity Remains Intact: Monitor for

## 2025-06-17 NOTE — PLAN OF CARE
Problem: Chronic Conditions and Co-morbidities  Goal: Patient's chronic conditions and co-morbidity symptoms are monitored and maintained or improved  Outcome: Progressing     Problem: Discharge Planning  Goal: Discharge to home or other facility with appropriate resources  Outcome: Progressing  Flowsheets (Taken 6/17/2025 0925)  Discharge to home or other facility with appropriate resources:   Identify barriers to discharge with patient and caregiver   Arrange for needed discharge resources and transportation as appropriate   Identify discharge learning needs (meds, wound care, etc)   Refer to discharge planning if patient needs post-hospital services based on physician order or complex needs related to functional status, cognitive ability or social support system     Problem: Pain  Goal: Verbalizes/displays adequate comfort level or baseline comfort level  Outcome: Progressing     Problem: Skin/Tissue Integrity  Goal: Skin integrity remains intact  Description: 1.  Monitor for areas of redness and/or skin breakdown2.  Assess vascular access sites hourly3.  Every 4-6 hours minimum:  Change oxygen saturation probe site4.  Every 4-6 hours:  If on nasal continuous positive airway pressure, respiratory therapy assess nares and determine need for appliance change or resting period  Outcome: Progressing  Flowsheets  Taken 6/17/2025 1003  Skin Integrity Remains Intact: Monitor for areas of redness and/or skin breakdown  Taken 6/17/2025 0925  Skin Integrity Remains Intact: Monitor for areas of redness and/or skin breakdown     Problem: Safety - Adult  Goal: Free from fall injury  Outcome: Progressing  Flowsheets (Taken 6/17/2025 1003)  Free From Fall Injury: Instruct family/caregiver on patient safety     Problem: ABCDS Injury Assessment  Goal: Absence of physical injury  Outcome: Progressing

## 2025-06-17 NOTE — PROGRESS NOTES
[facility]  Progress Note    Krista Chiang  MRN: [id number]  Date: 6/17/2025  LOS: 5     Reason for Consultation/Summary of Stay:   Pneumonia     Assessment :   Right lower lobe pneumonia suspected aspiration  Nausea, vomiting and weight loss  Allergy to Zosyn.   Itching, rash.  Right upper thigh/abdominal wounds   Suspected chronic osteomyelitis/fluid collection to the left hip.  S/P left hip hemiarthroplasty performed by Dr. Traore on 7/19/2023 after sustaining a femoral neck fracture.    S/P left hip I&D /placement of antibiotic cement beads on 8/21/2023 due to infection post hemiarthroplasty.  The patient was treated with Avycaz due to resistant Klebsiella on culture.S/P left hip [facility] at [facility].    DM  Alcohol abuse   Antiphospholipid syndrome   H/O bariatric surgery   H/O PE/DVT status post IVC filter  H/O MRSA/MDRO/VRE/Acinetobacter  History of C. difficile colitis    Plan:     Levaquin 750 mg oral daily course completed  Had one day of Zosyn and developed hives  Blood cultures NGTD  Strep pneumo, Legionella antigen negative.  Mycoplasma IgM negative  Mrsa negative  Follow CBC and renal function  Supportive care  Plan discussed with Dr. Flannery    Infection Control Recommendations:     [place] Precautions  Contact Isolation     Antimicrobial Stewardship Recommendations:   Simplification of therapy  Targeted therapy    History of Present Illness:   Krista Chiang is a 45 y.o.-year-old female was brought to the ED by EMS for hypoglycemia with reported glucose level of 20, was able to eat some and hypoglycemia resolved.  The patient had episodes of nausea and vomiting, decreased appetite and left hip pain.  She had occasional nonproductive cough, denied shortness of breath, no other complaints.  The patient was started initially on Zosyn developed severe itching with mild to her rash that was discontinued.  CT chest, abdomen and pelvis concerning for right lower lobe pneumonia. large  Dipak Traore DO at [facility] OR    HIP SURGERY Left 11/07/2023    DEPUY SPACER HIP EXPLANT, REPLACE DEPUY SPACER HIP (SYNTHES DEPUY EXTRACTION SET, LATERAL ON BEAN BAG) performed by Dipak Traore DO at [facility] OR    HIP SURGERY Left 11/26/2023    HIP SURGICAL SITE WOUND (DEHISCENCE) IRRIGATION AND DEBRIDEMENT    HIP SURGERY Left 11/26/2023    HIP SURGICAL SITE WOUND (DEHISCENCE) IRRIGATION AND DEBRIDEMENT performed by Dipka Traore DO at [facility] OR    HIP SURGERY Left     IRRIGATION AND DEBRIDEMENT HIP (IRRISEPT, CELLERATE) - Left    IVC FILTER INSERTION      LAPAROSCOPY  05/22/2013    LEISA    LIVER RESECTION  2010    for hepatic adenoma    LYMPH NODE BIOPSY  1990    JUSTINE AND BSO (CERVIX REMOVED)  2011    TONSILLECTOMY      UPPER GASTROINTESTINAL ENDOSCOPY N/A 12/18/2024    ESOPHAGOGASTRODUODENOSCOPY BIOPSY performed by Janet Marques MD at [facility] ENDO    UPPER GASTROINTESTINAL ENDOSCOPY N/A 01/06/2025    ESOPHAGOGASTRODUODENOSCOPY BIOPSY performed by Stefani Jensen MD at [facility] ENDO    UPPER GASTROINTESTINAL ENDOSCOPY N/A 03/14/2025    ESOPHAGOGASTRODUODENOSCOPY BIOPSY WITH BALOON DILATION performed by Alfie Wilson MD at [facility] ENDO    XR MIDLINE EQUAL OR GREATER THAN 5 YEARS  04/24/2013    XR MIDLINE EQUAL OR GREATER THAN 5 YEARS          [facility]  BERNARDA Lockwood CNP  Perfect Serve messaging  OFFICE: [phone]  FAX: [phone]      Thank you for allowing us to participate in the care of this patient. Please call with questions. Electronically signed by BERNARDA Lockwood CNP on 6/17/2025 at 8:32 AM  This note is created with the assistance of a speech recognition program.  While intending to generate a document that actually reflects the content of the visit, the document can still have some errors including those of syntax and sound a like substitutions which may escape proof reading.  In such instances, actual meaning can be extrapolated by contextual

## 2025-06-17 NOTE — CARE COORDINATION
DISCHARGE PLANNING NOTE:    Called patient's primary care office to schedule hospital follow up appointment. Due to availability in schedule they are going to message the provider and then call the patient back.    Electronically signed by Helene Crain RN on 6/17/2025 at 11:54 AM

## 2025-06-17 NOTE — PROGRESS NOTES
[facility]   OCCUPATIONAL THERAPY MISSED TREATMENT NOTE   INPATIENT   Date: 25  Patient Name: Krista Chiang       Room:   MRN: [id number]   Account #: [id number]    : 1980  (45 y.o.)  Gender: female   Referring Practitioner: Daphnie Patrick APRN - NP  Diagnosis: Community acquired pneumonia             REASON FOR MISSED TREATMENT:  Patient declined   -   Pt refused therapy at this time. Stating she was in \" so much pain\", pt was tearful due to increased pain. OT will continue to follow.     Electronically signed by DEEDEE Cruz on 25 at 3:27 PM EDT

## 2025-06-17 NOTE — PROGRESS NOTES
Xray went up at 3:45pm to do 4 hour images for UGI w Small Bowel w double contrast. Pt states she was supposed to be discharged today. Informed pt and nurses (Ruba and Mandy). Pt has to be NPO, and no suction. Pt has images to be done at 9pm and 8am tomorrow morning. Per Dr. Bob

## 2025-06-17 NOTE — PROGRESS NOTES
Imaging/Diagnostics:  CT LUMBAR SPINE BONY RECONSTRUCTION  Result Date: 6/12/2025  1. No acute fracture of the lumbar spine. 2. Chronic L1 superior endplate compression fracture. 3. Partially imaged right lower airspace opacities.  Please see same day CT chest for full details.     CT CHEST ABDOMEN PELVIS WO CONTRAST Additional Contrast? None  Result Date: 6/12/2025  1. Patchy airspace opacity at the mid and lower aspects of the right lower lobe concerning for pneumonia. 2. No acute intra-abdominal or pelvic process. 3. Status post intertrochanteric left hip fracture with resection of the femoral head and approximately 5 cm superior subluxation of the proximal femur. There is a large fluid collection extending between the acetabulum and proximal femur and slowly resolving postop changes in the soft tissues extending posterolaterally from the proximal femur.  Infection is considered unlikely but cannot be entirely excluded. 4. Status post Awa-en-Y gastric bypass, cholecystectomy, hysterectomy and IVC filter placement.     CT THORACIC SPINE BONY RECONSTRUCTION  Result Date: 6/12/2025  No acute thoracic spine abnormality.     CT Head W/O Contrast  Result Date: 6/12/2025  No acute intracranial process. No acute fracture or traumatic alignment in the cervical spine.     CT CSpine W/O Contrast  Result Date: 6/12/2025  No acute intracranial process. No acute fracture or traumatic alignment in the cervical spine.     XR CHEST PORTABLE  Result Date: 6/12/2025  No acute process.       Assessment :      Hospital Problems           Last Modified POA    * (Principal) Community acquired bacterial pneumonia 6/12/2025 Yes    Alcohol dependence in remission ([facility]) 6/12/2025 Yes    Primary hypercoagulable state (Chronic) 6/12/2025 Yes    Type 2 diabetes mellitus with diabetic polyneuropathy, with long-term current use of insulin ([facility]) 6/12/2025 Yes    Essential hypertension (Chronic) 6/12/2025 Yes    Antiphospholipid syndrome  (Chronic) 6/12/2025 Yes    S/P gastric bypass 6/12/2025 Yes    Pneumonia due to infectious organism 6/15/2025 Yes    Moderate malnutrition 6/12/2025 Yes    Nausea and vomiting 6/14/2025 Yes    Anastomotic ulcer S/P gastric bypass 6/14/2025 Yes    Hypoglycemia 6/13/2025 Yes    Allergy to antibiotic 6/14/2025 Yes       Plan:     Patient status inpatient in the Med/Surge    1.  44 female admitted with community-acquired pneumonia, started on Rocephin doxycycline,  Severe hypoglycemia, sugars were 20, with intervention sugars went up to 87, dropped down to 40 again, will start the D5, patient has been nauseous,  Nausea and vomiting and epigastric discomfort and vomiting 8-10 times a day, will get GI on board, avoid NSAIDs,  Hypercoag state due to antiphospholipid syndrome on full dose anticoagulation continue on that,  Status post gastric bypass in the past,  Alcohol dependence in the past,  DVT prophylaxis with fondaparinux  DVT prophylaxis  2. Disposition 3d        Hypoglycemia has resolved cortisol within normal limits will restart on 15 units of Lantus which is her home blood sugars has been stay  Dose of losartan decreased to 40, blood pressure is stable  Patient started on Levaquin since his allergy reaction to Zosyn, pneumonia workup negative so far  Seen by GI for intractable nausea vomiting, ordered upper GI and small bowel through today  ID consulted, CT hip showed large fluid collection unlikely infection CRP negative, Pro-Ej minimum,  Patient to follow-up with Ortho as outpatient, seen at [facility]  ID on board as well   hyponatremia resolved  Had MRCP yesterday showed chronic stable findings  Patient going for small bowel through today  Discharge once cleared by GI    DVT prophylaxis fondaparinux           6/        Consultations:   IP CONSULT TO DIETITIAN  PHARMACY TO DOSE VANCOMYCIN  IP CONSULT TO INFECTIOUS DISEASES  IP CONSULT TO GI  IP CONSULT TO VASCULAR ACCESS TEAM     Patient is admitted as inpatient

## 2025-06-17 NOTE — CARE COORDINATION
Case Management   Daily Progress Note       Patient Name: Krista Chiang                   YOB: 1980  Diagnosis: Hypoglycemia [E16.2]  Community acquired bacterial pneumonia [J15.9]  Pneumonia due to infectious organism, unspecified laterality, unspecified part of lung [J18.9]                       GMLOS: 3.2 days  Length of Stay: 5  days    Readmission Risk (Low < 19, Mod (19-27), High > 27): Readmission Risk Score: 36.6      Patient is alert and oriented.    Spoke with patient, and Current Transitional Plan is:    [] Home Independently    [x] Home with HC    [] Skilled Nursing Facility    [] Acute Rehabilitation    [] Long Term Acute Care (LTAC)    [] Other:     Medical Management: ID/GI Consults. Plan for small bowel follow through today.     Additional Notes: Current with [facility].  Will send HONG once signed and completed. Reached out to bedside nurse to complete HONG.     Discharge order in.    Electronically signed by Helene Crain RN on 6/17/2025 at 11:54 AM

## 2025-06-17 NOTE — PROGRESS NOTES
RN reached out to EHSAN Patrick after RN read some notes and noticed there was notes stating patient will be going for a small bowel through today. Patient had just ate half of a [employer] sandwich, there was no orders for a NPO status. EHSAN Patrick made patient NPO, there is not a time yet for the small bowel through. RN updated patient.

## 2025-06-17 NOTE — PROGRESS NOTES
Physical Therapy  [facility]   Physical Therapy Treatment  Date: 25  Patient Name: Krista Chiang       Room: 4-  MRN: [id number]  Account: [id number]   : 1980  (45 y.o.) Gender: female     Discharge Recommendations:  Discharge Recommendations: Continue to assess pending progress     PT D/C Equipment  Other: TBD  PT Equipment Recommendations  Other: TBD    General  Additional Pertinent Hx: per H&P: Krista Chiang is a 44 y.o. Non- / non  female who presents with Hypoglycemia   and is admitted to the hospital for the management of Community acquired bacterial pneumonia.      Patient presented to ED per EMS for low blood sugar with glucose level 20.  Apparently she was awake enough to eat a few bites of food and drink which brought her blood sugar up to 87.  On arrival to ED she reports that she has been having increased nausea and vomiting.  States that she has not had much of an appetite but still took took 14 units of Lantus and a partial dose of sliding scale earlier in the morning.  Also reports that she had a fall around 11 AM at home.  States that she has increased left hip pain.  She is on anticoagulation for antiphospholipid syndrome.  CT imaging shows no acute fracture or injuries.  CT chest abdomen pelvis reveals right lower lobe pneumonia.  Started on IV vancomycin and IV Zosyn.  Hgb 9.7 which is patient's baseline.  Urinalysis negative for infection.     Being admitted to MedSurg unit for pneumonia and hypoglycemia.  Response To Previous Treatment: Patient with no complaints from previous session.  Family/Caregiver Present: No  Referring Practitioner: ALDAIR RADFORD  Referral Date : 25  Diagnosis: community acquired bacterial pneumonia  Follows Commands: Within Functional Limits    Past Medical History:  has a past medical history of Alcohol abuse, Alcoholic hepatitis without ascites (HCC), Antiphospholipid syndrome, Anxiety,

## 2025-06-18 VITALS
RESPIRATION RATE: 16 BRPM | SYSTOLIC BLOOD PRESSURE: 105 MMHG | BODY MASS INDEX: 27.28 KG/M2 | DIASTOLIC BLOOD PRESSURE: 57 MMHG | HEART RATE: 68 BPM | TEMPERATURE: 97.7 F | OXYGEN SATURATION: 99 % | WEIGHT: 180 LBS | HEIGHT: 68 IN

## 2025-06-18 LAB
GLUCOSE BLD-MCNC: 254 MG/DL (ref 65–105)
GLUCOSE BLD-MCNC: 262 MG/DL (ref 65–105)
GLUCOSE BLD-MCNC: 281 MG/DL (ref 65–105)

## 2025-06-18 PROCEDURE — 6360000002 HC RX W HCPCS: Performed by: NURSE PRACTITIONER

## 2025-06-18 PROCEDURE — APPSS30 APP SPLIT SHARED TIME 16-30 MINUTES: Performed by: NURSE PRACTITIONER

## 2025-06-18 PROCEDURE — 99232 SBSQ HOSP IP/OBS MODERATE 35: CPT | Performed by: INTERNAL MEDICINE

## 2025-06-18 PROCEDURE — 99233 SBSQ HOSP IP/OBS HIGH 50: CPT | Performed by: INTERNAL MEDICINE

## 2025-06-18 PROCEDURE — 2500000003 HC RX 250 WO HCPCS: Performed by: NURSE PRACTITIONER

## 2025-06-18 PROCEDURE — 6370000000 HC RX 637 (ALT 250 FOR IP): Performed by: INTERNAL MEDICINE

## 2025-06-18 PROCEDURE — 99239 HOSP IP/OBS DSCHRG MGMT >30: CPT | Performed by: INTERNAL MEDICINE

## 2025-06-18 PROCEDURE — 6370000000 HC RX 637 (ALT 250 FOR IP): Performed by: NURSE PRACTITIONER

## 2025-06-18 PROCEDURE — 97116 GAIT TRAINING THERAPY: CPT

## 2025-06-18 PROCEDURE — 82947 ASSAY GLUCOSE BLOOD QUANT: CPT

## 2025-06-18 RX ORDER — OXYCODONE AND ACETAMINOPHEN 5; 325 MG/1; MG/1
1 TABLET ORAL ONCE
Refills: 0 | Status: DISCONTINUED | OUTPATIENT
Start: 2025-06-18 | End: 2025-06-18

## 2025-06-18 RX ADMIN — OXYCODONE HYDROCHLORIDE AND ACETAMINOPHEN 1 TABLET: 5; 325 TABLET ORAL at 02:59

## 2025-06-18 RX ADMIN — OXYCODONE HYDROCHLORIDE 2.5 MG: 5 TABLET ORAL at 16:26

## 2025-06-18 RX ADMIN — OXYCODONE HYDROCHLORIDE 2.5 MG: 5 TABLET ORAL at 11:35

## 2025-06-18 RX ADMIN — OXYCODONE HYDROCHLORIDE AND ACETAMINOPHEN 1 TABLET: 5; 325 TABLET ORAL at 11:35

## 2025-06-18 RX ADMIN — INSULIN LISPRO 4 UNITS: 100 INJECTION, SOLUTION INTRAVENOUS; SUBCUTANEOUS at 06:37

## 2025-06-18 RX ADMIN — VALSARTAN 40 MG: 80 TABLET, FILM COATED ORAL at 08:10

## 2025-06-18 RX ADMIN — BUPROPION HYDROCHLORIDE 300 MG: 300 TABLET, EXTENDED RELEASE ORAL at 08:08

## 2025-06-18 RX ADMIN — PANCRELIPASE LIPASE, PANCRELIPASE PROTEASE, PANCRELIPASE AMYLASE 40000 UNITS: 20000; 63000; 84000 CAPSULE, DELAYED RELEASE ORAL at 16:27

## 2025-06-18 RX ADMIN — FLUOXETINE HYDROCHLORIDE 40 MG: 20 CAPSULE ORAL at 08:08

## 2025-06-18 RX ADMIN — SUCRALFATE 1 G: 1 TABLET ORAL at 16:26

## 2025-06-18 RX ADMIN — OXYCODONE HYDROCHLORIDE 2.5 MG: 5 TABLET ORAL at 07:08

## 2025-06-18 RX ADMIN — Medication 1 TABLET: at 08:08

## 2025-06-18 RX ADMIN — PANCRELIPASE LIPASE, PANCRELIPASE PROTEASE, PANCRELIPASE AMYLASE 40000 UNITS: 20000; 63000; 84000 CAPSULE, DELAYED RELEASE ORAL at 11:30

## 2025-06-18 RX ADMIN — DICYCLOMINE HYDROCHLORIDE 10 MG: 10 CAPSULE ORAL at 16:26

## 2025-06-18 RX ADMIN — SUCRALFATE 1 G: 1 TABLET ORAL at 08:08

## 2025-06-18 RX ADMIN — INSULIN LISPRO 4 UNITS: 100 INJECTION, SOLUTION INTRAVENOUS; SUBCUTANEOUS at 11:30

## 2025-06-18 RX ADMIN — DICYCLOMINE HYDROCHLORIDE 10 MG: 10 CAPSULE ORAL at 11:30

## 2025-06-18 RX ADMIN — FONDAPARINUX SODIUM 7.5 MG: 7.5 INJECTION, SOLUTION SUBCUTANEOUS at 08:09

## 2025-06-18 RX ADMIN — SUCRALFATE 1 G: 1 TABLET ORAL at 11:35

## 2025-06-18 RX ADMIN — SODIUM CHLORIDE, PRESERVATIVE FREE 40 MG: 5 INJECTION INTRAVENOUS at 08:08

## 2025-06-18 RX ADMIN — OXYCODONE HYDROCHLORIDE AND ACETAMINOPHEN 1 TABLET: 5; 325 TABLET ORAL at 07:09

## 2025-06-18 RX ADMIN — OXYCODONE HYDROCHLORIDE 2.5 MG: 5 TABLET ORAL at 02:58

## 2025-06-18 RX ADMIN — DICYCLOMINE HYDROCHLORIDE 10 MG: 10 CAPSULE ORAL at 06:37

## 2025-06-18 RX ADMIN — SODIUM CHLORIDE, PRESERVATIVE FREE 10 ML: 5 INJECTION INTRAVENOUS at 08:10

## 2025-06-18 RX ADMIN — INSULIN LISPRO 4 UNITS: 100 INJECTION, SOLUTION INTRAVENOUS; SUBCUTANEOUS at 16:26

## 2025-06-18 RX ADMIN — OXYCODONE HYDROCHLORIDE AND ACETAMINOPHEN 1 TABLET: 5; 325 TABLET ORAL at 16:26

## 2025-06-18 ASSESSMENT — PAIN SCALES - GENERAL
PAINLEVEL_OUTOF10: 3
PAINLEVEL_OUTOF10: 7
PAINLEVEL_OUTOF10: 3
PAINLEVEL_OUTOF10: 7
PAINLEVEL_OUTOF10: 7

## 2025-06-18 ASSESSMENT — PAIN DESCRIPTION - ORIENTATION
ORIENTATION: RIGHT
ORIENTATION: RIGHT

## 2025-06-18 ASSESSMENT — PAIN DESCRIPTION - LOCATION
LOCATION: CHEST;LEG
LOCATION: CHEST;LEG

## 2025-06-18 ASSESSMENT — PAIN DESCRIPTION - DESCRIPTORS: DESCRIPTORS: PRESSURE;SHARP

## 2025-06-18 NOTE — PROGRESS NOTES
Writer walked into pt room and saw her drinking water and eating crackers. Pt was told she was NPO once at the beginning of shift and reinstated after writer saw her eating and drinking.

## 2025-06-18 NOTE — PROGRESS NOTES
[facility]   Physical Therapy Treatment  Date: 25  Patient Name: Krista Chiang       Room: 2074/2074-01  MRN: [id number]  Account: [id number]   : 1980  (45 y.o.) Gender: female     Discharge Recommendations:  Discharge Recommendations: Continue to assess pending progress     PT D/C Equipment  Other: TBD  PT Equipment Recommendations  Other: TBD         Past Medical History:  has a past medical history of Alcohol abuse, Alcoholic hepatitis without ascites ([facility]), Antiphospholipid syndrome, Anxiety, Arthritis, Painting esophagus, Bipolar disorder, unspecified ([facility]), 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 encounter, Constipation, Depression, Fibromyalgia, Genital herpes, unspecified, GERD (gastroesophageal reflux disease), H/O bariatric surgery, History of pulmonary embolism, Hx of blood clots, Hypertension, Lives in nursing home, Lumbosacral spondylosis without myelopathy, MDRO (multiple drug resistant organisms) resistance, Mobility impaired, MRSA (methicillin resistant staph aureus) culture positive, Muscle weakness, Obsessive-compulsive disorder, unspecified, Opioid abuse, in remission ([facility]), Pernicious anemia, Polyneuropathy, unspecified, PTSD (post-traumatic stress disorder), Pulmonary embolism ([facility]), Suicidal behavior, SVT (supraventricular tachycardia), Type 2 diabetes mellitus, with long-term current use of insulin ([facility]), Under care of team, and Under care of team.  Past Surgical History:   has a past surgical history that includes Cholecystectomy; Liver Resection (); Tonsillectomy; Abdominal hernia repair (); Abdominal hernia repair (2014); Bariatric Surgery (2013); Dilation and curettage of uterus (); Finger amputation (Left, 2015); lymph node biopsy (); Total abdominal hysterectomy w/ bilateral salpingoophorectomy (); IVC filter insertion; hip surgery (Left); Ankle fracture surgery (Left);  exercise to improve tolerance to daily activities      Plan  Physical Therapy Plan  General Plan:  (3-5 visits)  Specific Instructions for Next Treatment: funcitonal LE strengthening, static and dynamic balance training, wheelchair mobility, gait training w/ RW  Current Treatment Recommendations: Strengthening, Balance training, Functional mobility training, Transfer training, Endurance training, Wheelchair mobility training, Gait training, Neuromuscular re-education, Therapeutic activities   Safety Devices  Type of Devices: Call light within reach, Chair alarm in place, Left in chair       PT Individual Minutes  Time In: 1255  Time Out: 1318  Minutes: 23           Electronically signed by Gosia Junior PTA on 6/18/25 at 1:25 PM EDT

## 2025-06-18 NOTE — PROGRESS NOTES
[facility]    Gastroenterology Daily Progress Note      Patient:   Krista Chiang   :    1980   Facility:   [facility]  Date:     2025  Consultant:   BERNARDA Ryan CNP, CNP      SUBJECTIVE  45 y.o. female admitted 2025 with Hypoglycemia [E16.2]  Community acquired bacterial pneumonia [J15.9]  Pneumonia due to infectious organism, unspecified laterality, unspecified part of lung [J18.9] and seen for abdominal pain nausea. The pt was seen and examined. No c/o vomiting but still has nausea and generalized pain. SBFT results pending. .        OBJECTIVE  Scheduled Meds:   valsartan  40 mg Oral Daily    insulin glargine  15 Units SubCUTAneous Nightly    lidocaine  1 patch TransDERmal Daily    pantoprazole (PROTONIX) 40 mg in sodium chloride (PF) 0.9 % 10 mL injection  40 mg IntraVENous Daily    levoFLOXacin  750 mg Oral Q24H    buPROPion  300 mg Oral QAM    dicyclomine  10 mg Oral TID AC    FLUoxetine  40 mg Oral Daily    fondaparinux  7.5 mg SubCUTAneous Daily    lipase-protease-amylase  40,000 Units Oral TID WC    insulin lispro  0-8 Units SubCUTAneous 4x Daily AC & HS    therapeutic multivitamin-minerals  1 tablet Oral Daily    pravastatin  40 mg Oral Nightly    sucralfate  1 g Oral 4x Daily    sodium chloride flush  5-40 mL IntraVENous 2 times per day       Vital Signs:  BP (!) 105/57   Pulse 68   Temp 97.7 °F (36.5 °C)   Resp 16   Ht 1.727 m (5' 7.99\")   Wt 81.6 kg (180 lb)   SpO2 99%   BMI 27.38 kg/m²    Review of Systems - History obtained from chart review and the patient  General ROS: negative  Respiratory ROS: no cough, shortness of breath, or wheezing  Cardiovascular ROS: no chest pain or dyspnea on exertion  Gastrointestinal ROS abdominal pain nausea  Physical Exam:     General Appearance: alert and oriented to person, place and time, well-developed and well-nourished, in no acute distress  Skin: warm and dry, no rash or erythema  Head:  status  Consider trial of motegrity outpt, reglan not effective     2.pneumonia  Mgt per ID     3.Antiphospholipid syndrome on Arixtra      4.hypoglycemia improved    5.pancreatic tail cystic lesion on mri, ca 19 normal but cea elevated  Outpt f/u and serial imaging  Eus would likely not be possible due to her gastric bypass anatomy     Gi signing off f/u outpt    This plan was formulated in collaboration with Dr. Bernabe  .    Electronically signed by: BERNARDA Ryan - CNP on 6/18/2025 at 8:22 AM     Attending Physician Statement  I have discussed the care of Krista Chiang and I have examined the patient myselft and taken ros and hpi , including pertinent history and exam findings,  with the author of this note . I have reviewed the key elements of all parts of the encounter with the nurse practitioner/resident.  I agree with the assessment, plan and orders as documented by the above health care provider.  I have made necessary changes as deemed appropriate directly in the note.     More than 50% of the time was spent taking care of this patient in addition to the nurse practitioner time.  That also included history taking follow-up physical examination and review of system.    Electronically signed by Yuli Bernabe MD

## 2025-06-18 NOTE — PROGRESS NOTES
the orbits demonstrate no acute abnormality. SINUSES: The visualized paranasal sinuses and mastoid air cells demonstrate no acute abnormality. SOFT TISSUES/SKULL:  No acute abnormality of the visualized skull or soft tissues. CERVICAL BONES/ALIGNMENT: There is no acute fracture or traumatic malalignment. DEGENERATIVE CHANGES: No significant degenerative changes. SOFT TISSUES: There is no prevertebral soft tissue swelling.  Mild carotid artery calcifications.     No acute intracranial process. No acute fracture or traumatic alignment in the cervical spine.     CT CSpine W/O Contrast  Result Date: 6/12/2025  EXAMINATION: CT OF THE CERVICAL SPINE WITHOUT CONTRAST; CT OF THE HEAD WITHOUT CONTRAST 6/12/2025 3:10 am; 6/12/2025 3:51 am TECHNIQUE: CT of the cervical spine was performed without the administration of intravenous contrast. Multiplanar reformatted images are provided for review. Automated exposure control, iterative reconstruction, and/or weight based adjustment of the mA/kV was utilized to reduce the radiation dose to as low as reasonably achievable.; CT of the head was performed without the administration of intravenous contrast. Automated exposure control, iterative reconstruction, and/or weight based adjustment of the mA/kV was utilized to reduce the radiation dose to as low as reasonably achievable. COMPARISON: 03/26/2025. HISTORY: ORDERING SYSTEM PROVIDED HISTORY: fall TECHNOLOGIST PROVIDED HISTORY: fall Decision Support Exception - unselect if not a suspected or confirmed emergency medical condition->Emergency Medical Condition (MA) Is the patient pregnant?->No Reason for Exam: fall; ORDERING SYSTEM PROVIDED HISTORY: fall TECHNOLOGIST PROVIDED HISTORY: fall Is the patient pregnant?->No Reason for Exam: fall; ORDERING SYSTEM PROVIDED HISTORY: fall TECHNOLOGIST PROVIDED HISTORY: fall Decision Support Exception - unselect if not a suspected or confirmed emergency medical condition->Emergency Medical  Condition (MA) Is the patient pregnant?->No Reason for Exam: fall; ORDERING SYSTEM PROVIDED HISTORY: ruleout TECHNOLOGIST PROVIDED HISTORY: ruleout Decision Support Exception - unselect if not a suspected or confirmed emergency medical condition->Emergency Medical Condition (MA) Is the patient pregnant?->No Reason for Exam: fall FINDINGS: BRAIN/VENTRICLES: There is no acute intracranial hemorrhage, mass effect or midline shift.  No abnormal extra-axial fluid collection.  The gray-white differentiation is maintained without evidence of an acute infarct.  There is no evidence of hydrocephalus. ORBITS: The visualized portion of the orbits demonstrate no acute abnormality. SINUSES: The visualized paranasal sinuses and mastoid air cells demonstrate no acute abnormality. SOFT TISSUES/SKULL:  No acute abnormality of the visualized skull or soft tissues. CERVICAL BONES/ALIGNMENT: There is no acute fracture or traumatic malalignment. DEGENERATIVE CHANGES: No significant degenerative changes. SOFT TISSUES: There is no prevertebral soft tissue swelling.  Mild carotid artery calcifications.     No acute intracranial process. No acute fracture or traumatic alignment in the cervical spine.     XR CHEST PORTABLE  Result Date: 6/12/2025  EXAMINATION: ONE XRAY VIEW OF THE CHEST 6/12/2025 2:27 am COMPARISON: Chest radiograph 05/18/2025. HISTORY: ORDERING SYSTEM PROVIDED HISTORY: Shortness of Breath TECHNOLOGIST PROVIDED HISTORY: Shortness of Breath Reason for Exam: Shortness of Breath Additional signs and symptoms: Shortness of Breath Relevant Medical/Surgical History: Shortness of Breath FINDINGS: The lungs are without acute focal process.  There is no effusion or pneumothorax. The cardiomediastinal silhouette is without acute process. The osseous structures are without acute process.     No acute process.           Medications:      valsartan  40 mg Oral Daily    insulin glargine  15 Units SubCUTAneous Nightly    lidocaine  1 patch

## 2025-06-18 NOTE — CARE COORDINATION
Continuity of Care Form    Patient Name: Krista Chiang   :  1980  MRN:  [id number]    Admit date:  2025  Discharge date:  2025    Code Status Order: Full Code   Advance Directives:     Admitting Physician:  Wilner Ash MD  PCP: Arielle Melton, APRN - NP    Discharging Nurse: Mandy PEREZ RN   Discharging Hospital Unit/Room#: 2074/2074-01  Discharging Unit Phone Number: [phone]    Emergency Contact:   Extended Emergency Contact Information  Primary Emergency Contact: Naima Grant  Address: [address] [address]  Home Phone: [phone]  Mobile Phone: [phone]  Relation: Parent  Secondary Emergency Contact: Zachery Grant  Home Phone: [phone]  Mobile Phone: [phone]  Relation: Brother/Sister    Past Surgical History:  Past Surgical History:   Procedure Laterality Date    ABDOMINAL HERNIA REPAIR      incisional hernia repair, complicated by infection, had multiple surgeries for this    ABDOMINAL HERNIA REPAIR  2014    ABLATION OF DYSRHYTHMIC FOCUS  2025    ANKLE FRACTURE SURGERY Left     2023  HIP IRRIGATION AND DEBRIDEMENT AND PLACEMENT OF ANTIBIOTIC IMPREGNATED CEMENT BEADS performed by Dipak Traore DO at [facility] OR    ANKLE FRACTURE SURGERY Left 10/24/2023    IRRIGATION AND DEBRIDEMENT OF LEFT HIP WITH CLOSURE performed by Dipak Traore DO at [facility] OR    ANKLE SURGERY Left 2019    ligament    ANKLE SURGERY Left 2023    IRRIGATION AND DEBRIDEMENT HIP (IRRISEPT, CELLERATE) performed by Dipak Traore DO at [facility] OR    BARIATRIC SURGERY  2013    [facility] : [facility]     SECTION      CHOLECYSTECTOMY      DILATION AND CURETTAGE OF UTERUS      EP DEVICE PROCEDURE N/A 2025    juan junior / Ablation SVT / no anes / julia performed by Sherman Rucker MD at [facility] CARDIAC CATH LAB    ESOPHAGOGASTRODUODENOSCOPY  2021    ESOPHAGOGASTRODUODENOSCOPY   05/13/2013    FINGER AMPUTATION Left 02/09/2015    index and ring finger. from [facility] CATH POWER PICC SINGLE  08/23/2023    HIP SURGERY Left     7/19/2023 HIP HEMIARTHROPLASTY performed by Dipak Traore DO at [facility] OR    HIP SURGERY Left 11/07/2023    DEPUY SPACER HIP EXPLANT, REPLACE DEPUY SPACER HIP (SYNTHES DEPUY EXTRACTION SET, LATERAL ON BEAN BAG) performed by Dipak rTaore DO at [facility] OR    HIP SURGERY Left 11/26/2023    HIP SURGICAL SITE WOUND (DEHISCENCE) IRRIGATION AND DEBRIDEMENT    HIP SURGERY Left 11/26/2023    HIP SURGICAL SITE WOUND (DEHISCENCE) IRRIGATION AND DEBRIDEMENT performed by Dipak Traore DO at [facility] OR    HIP SURGERY Left     IRRIGATION AND DEBRIDEMENT HIP (IRRISEPT, CELLERATE) - Left    IVC FILTER INSERTION      LAPAROSCOPY  05/22/2013    LEISA    LIVER RESECTION  2010    for hepatic adenoma    LYMPH NODE BIOPSY  1990    JUSTINE AND BSO (CERVIX REMOVED)  2011    TONSILLECTOMY      UPPER GASTROINTESTINAL ENDOSCOPY N/A 12/18/2024    ESOPHAGOGASTRODUODENOSCOPY BIOPSY performed by Janet Marques MD at [facility] ENDO    UPPER GASTROINTESTINAL ENDOSCOPY N/A 01/06/2025    ESOPHAGOGASTRODUODENOSCOPY BIOPSY performed by Stefani Jensen MD at [facility] ENDO    UPPER GASTROINTESTINAL ENDOSCOPY N/A 03/14/2025    ESOPHAGOGASTRODUODENOSCOPY BIOPSY WITH BALOON DILATION performed by Alfie Wilson MD at [facility] ENDO    XR MIDLINE EQUAL OR GREATER THAN 5 YEARS  04/24/2013    XR MIDLINE EQUAL OR GREATER THAN 5 YEARS       Immunization History:   Immunization History   Administered Date(s) Administered    COVID-19, PFIZER Bivalent, DO NOT Dilute, (age 12y+), IM, 30 mcg/0.3 mL 12/06/2022    COVID-19, PFIZER PURPLE top, DILUTE for use, (age 12 y+), 30mcg/0.3mL 04/07/2021, 04/28/2021, 01/09/2022    Hep B, ENGERIX-B, (age 20y+), IM, 1mL 01/10/2020    Hep B, RECOMBIVAX-HB, (age 20y+), IM, 1mL 01/10/2020    Influenza Vaccine, unspecified formulation 10/04/2017, 09/18/2018    Influenza Virus Vaccine

## 2025-06-18 NOTE — PLAN OF CARE
Problem: Pain  Goal: Verbalizes/displays adequate comfort level or baseline comfort level  6/17/2025 2309 by Vanessa Velásquez RN  Outcome: Progressing  6/17/2025 1715 by Mandy Hahn RN  Outcome: Progressing     Problem: Skin/Tissue Integrity  Goal: Skin integrity remains intact  Description: 1.  Monitor for areas of redness and/or skin breakdown2.  Assess vascular access sites hourly3.  Every 4-6 hours minimum:  Change oxygen saturation probe site4.  Every 4-6 hours:  If on nasal continuous positive airway pressure, respiratory therapy assess nares and determine need for appliance change or resting period  6/17/2025 2309 by Vanessa Velásquez RN  Outcome: Progressing  Flowsheets (Taken 6/17/2025 2008)  Skin Integrity Remains Intact:   Monitor for areas of redness and/or skin breakdown   Assess vascular access sites hourly   Every 4-6 hours minimum:  Change oxygen saturation probe site   Every 4-6 hours:  If on nasal continuous positive airway pressure, assess nares and determine need for appliance change or resting period   Turn and reposition as indicated   Check visual cues for pain   Monitor skin under medical devices  6/17/2025 1715 by Mandy Hahn RN  Outcome: Progressing  Flowsheets  Taken 6/17/2025 1003  Skin Integrity Remains Intact: Monitor for areas of redness and/or skin breakdown  Taken 6/17/2025 0925  Skin Integrity Remains Intact: Monitor for areas of redness and/or skin breakdown     Problem: Safety - Adult  Goal: Free from fall injury  6/17/2025 2309 by Vanessa Velásquez RN  Outcome: Progressing  Flowsheets (Taken 6/17/2025 2008)  Free From Fall Injury: Instruct family/caregiver on patient safety  6/17/2025 1715 by Mandy Hahn RN  Outcome: Progressing  Flowsheets (Taken 6/17/2025 1003)  Free From Fall Injury: Instruct family/caregiver on patient safety     Problem: ABCDS Injury Assessment  Goal: Absence of physical injury  6/17/2025 2309 by Vanessa Velásquez RN  Outcome:

## 2025-06-18 NOTE — PROGRESS NOTES
[facility] Internal Medicine  Raul Heard MD; Ezio Conway MD, Crystal Estrada MD,    Wilner Ash MD, Carli Mendzoa MD.    [facility] Internal Medicine   IN-PATIENT SERVICE   [facility]    Progress note             Date:   6/18/2025  Patient name:  Krista Chiang  Date of admission:  6/12/2025  1:23 AM  MRN:   [id number]  Account:  [id number]  YOB: 1980  PCP:    Arielle Melton APRN - NP  Room:   2074/2074-01  Code Status:    Full Code    Chief Complaint:     Chief Complaint   Patient presents with    Hypoglycemia       History Obtained From:     Patient/EMR/Bedside RN    History of Present Illness:     Krista Chiang is a 44 y.o. Non- / non  female who presents with Hypoglycemia   and is admitted to the hospital for the management of Community acquired bacterial pneumonia.      Patient presented to ED per EMS for low blood sugar with glucose level 20.  Apparently she was awake enough to eat a few bites of food and drink which brought her blood sugar up to 87.  On arrival to ED she reports that she has been having increased nausea and vomiting.  States that she has not had much of an appetite but still took took 14 units of Lantus and a partial dose of sliding scale earlier in the morning.  Also reports that she had a fall around 11 AM at home.  States that she has increased left hip pain.  She is on anticoagulation for antiphospholipid syndrome.  CT imaging shows no acute fracture or injuries.  CT chest abdomen pelvis reveals right lower lobe pneumonia.  Started on IV vancomycin and IV Zosyn.  Hgb 9.7 which is patient's baseline.  Urinalysis negative for infection.     Being admitted to MedSurg unit for pneumonia and hypoglycemia.        6/13  Patient seen and examined nausea vomiting is better on clear liquid diet  Blood sugars getting better    Past Medical History:     Past Medical History:   Diagnosis Date    Alcohol  Normal rate, regular rhythm.  Heart sounds S1, S2.  No murmurs, rubs or gallops.

## 2025-06-18 NOTE — CARE COORDINATION
Case Management   Daily Progress Note       Patient Name: Krista Chiang                   YOB: 1980  Diagnosis: Hypoglycemia [E16.2]  Community acquired bacterial pneumonia [J15.9]  Pneumonia due to infectious organism, unspecified laterality, unspecified part of lung [J18.9]                       GMLOS: 3.2 days  Length of Stay: 6  days    Readmission Risk (Low < 19, Mod (19-27), High > 27): Readmission Risk Score: 34.5      Patient is alert and oriented.    Spoke with patient, and Current Transitional Plan is:    [] Home Independently    [x] Home with HC    [] Skilled Nursing Facility    [] Acute Rehabilitation    [] Long Term Acute Care (LTAC)    [] Other:     Medical Management: ID/GI Consults. Small bowel follow through done yesterday, results still pending.     Additional Notes: Discharge order in. Home with [facility] [place].    Electronically signed by Helene Crain RN on 6/18/2025 at 12:46 PM

## 2025-06-18 NOTE — PROGRESS NOTES
[facility]   OCCUPATIONAL THERAPY MISSED TREATMENT NOTE   INPATIENT   Date: 25  Patient Name: Krista Chiang       Room:   MRN: [id number]   Account #: [id number]    : 1980  (45 y.o.)  Gender: female   Referring Practitioner: Daphnie Patrick APRN - NP  Diagnosis: Community acquired pneumonia             REASON FOR MISSED TREATMENT:  Patient declined   -    @1332 stating she will be discharging sometime today however unsure of time. Pt also reports feeling tired after completing physical therapy earlier. Will continue to follow for updates/needs as time permits.             Electronically signed by ROBERTO Rubi on 25 at 2:02 PM EDT

## 2025-06-19 ENCOUNTER — CARE COORDINATION (OUTPATIENT)
Dept: CARE COORDINATION | Age: 45
End: 2025-06-19

## 2025-06-19 LAB
MISCELLANEOUS LAB TEST RESULT: NORMAL
TEST NAME: NORMAL

## 2025-06-19 NOTE — CARE COORDINATION
Care Transitions Note    Initial Call - Call within 2 business days of discharge: Yes    Patient Current Location:  [place]    Care Transition Nurse contacted the juan/pcp office, Rita Cruz Care transition navigator  by telephone to perform post hospital discharge assessment, verified name and  as identifiers.  Provided introduction to self, and explanation of the Care Transition Nurse role.    Patient: Krista Chiang    Patient : 1980   MRN: [id number]    Reason for Admission: hypglycemia  Discharge Date: 25  RURS: Readmission Risk Score: 34.5      Last Discharge Facility       Date Complaint Diagnosis Description Type Department Provider    25 Hypoglycemia Hypoglycemia ... ED to Hosp-Admission (Discharged) (ADMITTED) [facility] Wilner Wild MD; Francisco Javier Haines...            Was this an external facility discharge? No    Additional needs identified to be addressed with provider                 Method of communication with provider: phone.    Patients top risk factors for readmission:      Interventions to address risk factors:   Communication with providers: contacted Care navigator and PCP office    Care Summary Note: Writer contacted pcp office spoke to Rita Mercedes Care navigator will be transitioning patient, office has already schedule HFU and has spoke with patient today,CTN episode resolved.    Medication Review:  Medication review was not performed during this call, care navigator for patient will f/u with patient.     Remote Patient Monitoring:  Offered patient enrollment in the Remote Patient Monitoring (RPM) program for in-home monitoring: Patient is not eligible for RPM program because: affiliate provider.    Assessments:  Care Transitions 24 Hour Call    Do you have all of your prescriptions and are they filled?: Yes  Post Acute Services: Home Health (Comment: [facility])  Care Transitions Interventions    Physical Therapy: Completed Other Services: Completed

## 2025-06-20 ENCOUNTER — HOSPITAL ENCOUNTER (INPATIENT)
Age: 45
LOS: 2 days | Discharge: HOME HEALTH | DRG: 312 | End: 2025-06-22
Attending: EMERGENCY MEDICINE
Payer: MEDICARE

## 2025-06-20 ENCOUNTER — APPOINTMENT (OUTPATIENT)
Dept: GENERAL RADIOLOGY | Age: 45
DRG: 312 | End: 2025-06-20
Attending: EMERGENCY MEDICINE
Payer: MEDICARE

## 2025-06-20 DIAGNOSIS — O26.50 MATERNAL HYPOTENSION SYNDROME, ANTEPARTUM: ICD-10-CM

## 2025-06-20 DIAGNOSIS — I95.9 HYPOTENSION, UNSPECIFIED HYPOTENSION TYPE: Primary | ICD-10-CM

## 2025-06-20 DIAGNOSIS — Z79.4 TYPE 2 DIABETES MELLITUS WITH OTHER SPECIFIED COMPLICATION, WITH LONG-TERM CURRENT USE OF INSULIN (HCC): ICD-10-CM

## 2025-06-20 DIAGNOSIS — E11.69 TYPE 2 DIABETES MELLITUS WITH OTHER SPECIFIED COMPLICATION, WITH LONG-TERM CURRENT USE OF INSULIN (HCC): ICD-10-CM

## 2025-06-20 LAB
ALBUMIN SERPL-MCNC: 3.2 G/DL (ref 3.5–5.2)
ALP SERPL-CCNC: 116 U/L (ref 35–104)
ALT SERPL-CCNC: 33 U/L (ref 10–35)
AMPHET UR QL SCN: NEGATIVE
ANION GAP SERPL CALCULATED.3IONS-SCNC: 14 MMOL/L (ref 9–16)
APAP SERPL-MCNC: 10 UG/ML (ref 10–30)
AST SERPL-CCNC: 35 U/L (ref 10–35)
BARBITURATES UR QL SCN: NEGATIVE
BASOPHILS # BLD: 0.07 K/UL (ref 0–0.2)
BASOPHILS NFR BLD: 1 % (ref 0–2)
BENZODIAZ UR QL: NEGATIVE
BILIRUB SERPL-MCNC: <0.2 MG/DL (ref 0–1.2)
BILIRUB UR QL STRIP: NEGATIVE
BUN SERPL-MCNC: 24 MG/DL (ref 6–20)
CALCIUM SERPL-MCNC: 8.7 MG/DL (ref 8.6–10.4)
CANNABINOIDS UR QL SCN: NEGATIVE
CHLORIDE SERPL-SCNC: 102 MMOL/L (ref 98–107)
CLARITY UR: CLEAR
CO2 SERPL-SCNC: 17 MMOL/L (ref 20–31)
COCAINE UR QL SCN: NEGATIVE
COLOR UR: YELLOW
COMMENT: ABNORMAL
CREAT SERPL-MCNC: 1.1 MG/DL (ref 0.7–1.2)
EOSINOPHIL # BLD: 0.33 K/UL (ref 0–0.44)
EOSINOPHILS RELATIVE PERCENT: 4 % (ref 0–4)
ERYTHROCYTE [DISTWIDTH] IN BLOOD BY AUTOMATED COUNT: 14.9 % (ref 11.5–14.9)
ETHANOL PERCENT: NORMAL %
ETHANOLAMINE SERPL-MCNC: <10 MG/DL (ref 0–0.08)
FENTANYL UR QL: NEGATIVE
GFR, ESTIMATED: 63 ML/MIN/1.73M2
GLUCOSE BLD-MCNC: 173 MG/DL (ref 65–105)
GLUCOSE BLD-MCNC: 205 MG/DL (ref 65–105)
GLUCOSE BLD-MCNC: 62 MG/DL (ref 65–105)
GLUCOSE SERPL-MCNC: 90 MG/DL (ref 74–99)
GLUCOSE UR STRIP-MCNC: ABNORMAL MG/DL
HCT VFR BLD AUTO: 29.8 % (ref 36–46)
HGB BLD-MCNC: 9.2 G/DL (ref 12–16)
HGB UR QL STRIP.AUTO: NEGATIVE
IMM GRANULOCYTES # BLD AUTO: <0.03 K/UL (ref 0–0.3)
IMM GRANULOCYTES NFR BLD: 0 %
KETONES UR STRIP-MCNC: ABNORMAL MG/DL
LEUKOCYTE ESTERASE UR QL STRIP: NEGATIVE
LYMPHOCYTES NFR BLD: 1.17 K/UL (ref 1.1–3.7)
LYMPHOCYTES RELATIVE PERCENT: 15 % (ref 24–44)
MAGNESIUM SERPL-MCNC: 1.9 MG/DL (ref 1.6–2.6)
MCH RBC QN AUTO: 29 PG (ref 26–34)
MCHC RBC AUTO-ENTMCNC: 30.9 G/DL (ref 31–37)
MCV RBC AUTO: 94 FL (ref 80–100)
METHADONE UR QL: NEGATIVE
MONOCYTES NFR BLD: 0.45 K/UL (ref 0.1–1.2)
MONOCYTES NFR BLD: 6 % (ref 3–12)
NEUTROPHILS NFR BLD: 74 % (ref 36–66)
NEUTS SEG NFR BLD: 5.83 K/UL (ref 1.5–8.1)
NITRITE UR QL STRIP: NEGATIVE
NRBC BLD-RTO: 0 PER 100 WBC
OPIATES UR QL SCN: NEGATIVE
OXYCODONE UR QL SCN: POSITIVE
PCP UR QL SCN: NEGATIVE
PH UR STRIP: 5.5 [PH] (ref 5–8)
PLATELET # BLD AUTO: 315 K/UL (ref 150–450)
PMV BLD AUTO: 10.5 FL (ref 8–13.5)
POTASSIUM SERPL-SCNC: 4.3 MMOL/L (ref 3.7–5.3)
PROT SERPL-MCNC: 6 G/DL (ref 6.6–8.7)
PROT UR STRIP-MCNC: NEGATIVE MG/DL
RBC # BLD AUTO: 3.17 M/UL (ref 3.95–5.11)
SALICYLATES SERPL-MCNC: 1.7 MG/DL (ref 0–10)
SODIUM SERPL-SCNC: 133 MMOL/L (ref 136–145)
SP GR UR STRIP: 1.01 (ref 1–1.03)
TEST INFORMATION: ABNORMAL
UROBILINOGEN UR STRIP-ACNC: NORMAL EU/DL (ref 0–1)
WBC OTHER # BLD: 7.9 K/UL (ref 3.5–11)

## 2025-06-20 PROCEDURE — 80307 DRUG TEST PRSMV CHEM ANLYZR: CPT

## 2025-06-20 PROCEDURE — 99285 EMERGENCY DEPT VISIT HI MDM: CPT

## 2025-06-20 PROCEDURE — 81003 URINALYSIS AUTO W/O SCOPE: CPT

## 2025-06-20 PROCEDURE — 96360 HYDRATION IV INFUSION INIT: CPT

## 2025-06-20 PROCEDURE — 93005 ELECTROCARDIOGRAM TRACING: CPT | Performed by: EMERGENCY MEDICINE

## 2025-06-20 PROCEDURE — 85025 COMPLETE CBC W/AUTO DIFF WBC: CPT

## 2025-06-20 PROCEDURE — 96361 HYDRATE IV INFUSION ADD-ON: CPT

## 2025-06-20 PROCEDURE — 80143 DRUG ASSAY ACETAMINOPHEN: CPT

## 2025-06-20 PROCEDURE — 80179 DRUG ASSAY SALICYLATE: CPT

## 2025-06-20 PROCEDURE — 36415 COLL VENOUS BLD VENIPUNCTURE: CPT

## 2025-06-20 PROCEDURE — 80053 COMPREHEN METABOLIC PANEL: CPT

## 2025-06-20 PROCEDURE — G0480 DRUG TEST DEF 1-7 CLASSES: HCPCS

## 2025-06-20 PROCEDURE — 2580000003 HC RX 258: Performed by: EMERGENCY MEDICINE

## 2025-06-20 PROCEDURE — 82947 ASSAY GLUCOSE BLOOD QUANT: CPT

## 2025-06-20 PROCEDURE — 6370000000 HC RX 637 (ALT 250 FOR IP)

## 2025-06-20 PROCEDURE — 6370000000 HC RX 637 (ALT 250 FOR IP): Performed by: EMERGENCY MEDICINE

## 2025-06-20 PROCEDURE — 6370000000 HC RX 637 (ALT 250 FOR IP): Performed by: NURSE PRACTITIONER

## 2025-06-20 PROCEDURE — 1200000000 HC SEMI PRIVATE

## 2025-06-20 PROCEDURE — 2580000003 HC RX 258

## 2025-06-20 PROCEDURE — 71045 X-RAY EXAM CHEST 1 VIEW: CPT

## 2025-06-20 PROCEDURE — 83735 ASSAY OF MAGNESIUM: CPT

## 2025-06-20 RX ORDER — BUPROPION HYDROCHLORIDE 300 MG/1
300 TABLET ORAL EVERY MORNING
Status: DISCONTINUED | OUTPATIENT
Start: 2025-06-21 | End: 2025-06-22 | Stop reason: HOSPADM

## 2025-06-20 RX ORDER — ONDANSETRON 2 MG/ML
4 INJECTION INTRAMUSCULAR; INTRAVENOUS EVERY 6 HOURS PRN
Status: DISCONTINUED | OUTPATIENT
Start: 2025-06-20 | End: 2025-06-22 | Stop reason: HOSPADM

## 2025-06-20 RX ORDER — DICYCLOMINE HYDROCHLORIDE 10 MG/1
10 CAPSULE ORAL
Status: DISCONTINUED | OUTPATIENT
Start: 2025-06-20 | End: 2025-06-22 | Stop reason: HOSPADM

## 2025-06-20 RX ORDER — BUMETANIDE 1 MG/1
2 TABLET ORAL DAILY PRN
Status: DISCONTINUED | OUTPATIENT
Start: 2025-06-20 | End: 2025-06-22 | Stop reason: HOSPADM

## 2025-06-20 RX ORDER — 0.9 % SODIUM CHLORIDE 0.9 %
1000 INTRAVENOUS SOLUTION INTRAVENOUS ONCE
Status: COMPLETED | OUTPATIENT
Start: 2025-06-20 | End: 2025-06-20

## 2025-06-20 RX ORDER — INSULIN LISPRO 100 [IU]/ML
0-4 INJECTION, SOLUTION INTRAVENOUS; SUBCUTANEOUS
Status: DISCONTINUED | OUTPATIENT
Start: 2025-06-20 | End: 2025-06-22 | Stop reason: HOSPADM

## 2025-06-20 RX ORDER — POLYETHYLENE GLYCOL 3350 17 G/17G
17 POWDER, FOR SOLUTION ORAL DAILY PRN
Status: DISCONTINUED | OUTPATIENT
Start: 2025-06-20 | End: 2025-06-22 | Stop reason: HOSPADM

## 2025-06-20 RX ORDER — SUCRALFATE 1 G/1
1 TABLET ORAL 4 TIMES DAILY
Status: DISCONTINUED | OUTPATIENT
Start: 2025-06-20 | End: 2025-06-22 | Stop reason: HOSPADM

## 2025-06-20 RX ORDER — VALSARTAN 40 MG/1
40 TABLET ORAL DAILY
Status: DISCONTINUED | OUTPATIENT
Start: 2025-06-20 | End: 2025-06-22

## 2025-06-20 RX ORDER — OXYCODONE AND ACETAMINOPHEN 7.5; 325 MG/1; MG/1
1 TABLET ORAL EVERY 8 HOURS PRN
Status: DISCONTINUED | OUTPATIENT
Start: 2025-06-20 | End: 2025-06-20 | Stop reason: CLARIF

## 2025-06-20 RX ORDER — HYDROCODONE BITARTRATE AND ACETAMINOPHEN 5; 325 MG/1; MG/1
1 TABLET ORAL ONCE
Refills: 0 | Status: COMPLETED | OUTPATIENT
Start: 2025-06-20 | End: 2025-06-20

## 2025-06-20 RX ORDER — MAGNESIUM SULFATE HEPTAHYDRATE 40 MG/ML
2000 INJECTION, SOLUTION INTRAVENOUS PRN
Status: DISCONTINUED | OUTPATIENT
Start: 2025-06-20 | End: 2025-06-22 | Stop reason: HOSPADM

## 2025-06-20 RX ORDER — SODIUM CHLORIDE 0.9 % (FLUSH) 0.9 %
5-40 SYRINGE (ML) INJECTION EVERY 12 HOURS SCHEDULED
Status: DISCONTINUED | OUTPATIENT
Start: 2025-06-20 | End: 2025-06-22 | Stop reason: HOSPADM

## 2025-06-20 RX ORDER — INSULIN LISPRO 100 [IU]/ML
INJECTION, SOLUTION INTRAVENOUS; SUBCUTANEOUS
Status: CANCELLED | OUTPATIENT
Start: 2025-06-20

## 2025-06-20 RX ORDER — ACETAMINOPHEN 650 MG/1
650 SUPPOSITORY RECTAL EVERY 6 HOURS PRN
Status: DISCONTINUED | OUTPATIENT
Start: 2025-06-20 | End: 2025-06-22 | Stop reason: HOSPADM

## 2025-06-20 RX ORDER — INSULIN GLARGINE 100 [IU]/ML
12 INJECTION, SOLUTION SUBCUTANEOUS NIGHTLY
Status: DISCONTINUED | OUTPATIENT
Start: 2025-06-20 | End: 2025-06-22 | Stop reason: HOSPADM

## 2025-06-20 RX ORDER — SODIUM CHLORIDE 9 MG/ML
INJECTION, SOLUTION INTRAVENOUS PRN
Status: DISCONTINUED | OUTPATIENT
Start: 2025-06-20 | End: 2025-06-22 | Stop reason: HOSPADM

## 2025-06-20 RX ORDER — OXYCODONE HYDROCHLORIDE 5 MG/1
2.5 TABLET ORAL EVERY 8 HOURS PRN
Refills: 0 | Status: DISCONTINUED | OUTPATIENT
Start: 2025-06-20 | End: 2025-06-21

## 2025-06-20 RX ORDER — NYSTATIN 100000 [USP'U]/G
POWDER TOPICAL 4 TIMES DAILY
COMMUNITY

## 2025-06-20 RX ORDER — SODIUM CHLORIDE 0.9 % (FLUSH) 0.9 %
5-40 SYRINGE (ML) INJECTION PRN
Status: DISCONTINUED | OUTPATIENT
Start: 2025-06-20 | End: 2025-06-22 | Stop reason: HOSPADM

## 2025-06-20 RX ORDER — ONDANSETRON 4 MG/1
4 TABLET, ORALLY DISINTEGRATING ORAL EVERY 8 HOURS PRN
Status: DISCONTINUED | OUTPATIENT
Start: 2025-06-20 | End: 2025-06-22 | Stop reason: HOSPADM

## 2025-06-20 RX ORDER — MIDODRINE HYDROCHLORIDE 5 MG/1
5 TABLET ORAL ONCE
Status: COMPLETED | OUTPATIENT
Start: 2025-06-20 | End: 2025-06-20

## 2025-06-20 RX ORDER — DEXTROSE MONOHYDRATE 100 MG/ML
INJECTION, SOLUTION INTRAVENOUS CONTINUOUS PRN
Status: DISCONTINUED | OUTPATIENT
Start: 2025-06-20 | End: 2025-06-22 | Stop reason: HOSPADM

## 2025-06-20 RX ORDER — POTASSIUM CHLORIDE 7.45 MG/ML
10 INJECTION INTRAVENOUS PRN
Status: DISCONTINUED | OUTPATIENT
Start: 2025-06-20 | End: 2025-06-22 | Stop reason: HOSPADM

## 2025-06-20 RX ORDER — POTASSIUM CHLORIDE 1500 MG/1
40 TABLET, EXTENDED RELEASE ORAL PRN
Status: DISCONTINUED | OUTPATIENT
Start: 2025-06-20 | End: 2025-06-22 | Stop reason: HOSPADM

## 2025-06-20 RX ORDER — PRAVASTATIN SODIUM 40 MG
40 TABLET ORAL NIGHTLY
Status: DISCONTINUED | OUTPATIENT
Start: 2025-06-20 | End: 2025-06-22 | Stop reason: HOSPADM

## 2025-06-20 RX ORDER — INSULIN LISPRO 100 [IU]/ML
0-8 INJECTION, SOLUTION INTRAVENOUS; SUBCUTANEOUS
Status: CANCELLED | OUTPATIENT
Start: 2025-06-20

## 2025-06-20 RX ORDER — SODIUM CHLORIDE 9 MG/ML
INJECTION, SOLUTION INTRAVENOUS CONTINUOUS
Status: DISCONTINUED | OUTPATIENT
Start: 2025-06-20 | End: 2025-06-22

## 2025-06-20 RX ORDER — OXYCODONE AND ACETAMINOPHEN 5; 325 MG/1; MG/1
1 TABLET ORAL EVERY 8 HOURS PRN
Refills: 0 | Status: DISCONTINUED | OUTPATIENT
Start: 2025-06-20 | End: 2025-06-22 | Stop reason: HOSPADM

## 2025-06-20 RX ORDER — MIDODRINE HYDROCHLORIDE 2.5 MG/1
2.5 TABLET ORAL 3 TIMES DAILY PRN
Status: DISCONTINUED | OUTPATIENT
Start: 2025-06-20 | End: 2025-06-22 | Stop reason: HOSPADM

## 2025-06-20 RX ORDER — BUMETANIDE 1 MG/1
2 TABLET ORAL DAILY PRN
Status: CANCELLED | OUTPATIENT
Start: 2025-06-20

## 2025-06-20 RX ORDER — FONDAPARINUX SODIUM 10 MG/.8ML
10 INJECTION SUBCUTANEOUS DAILY
Status: DISCONTINUED | OUTPATIENT
Start: 2025-06-20 | End: 2025-06-22 | Stop reason: HOSPADM

## 2025-06-20 RX ORDER — PANTOPRAZOLE SODIUM 40 MG/1
40 TABLET, DELAYED RELEASE ORAL
Status: DISCONTINUED | OUTPATIENT
Start: 2025-06-21 | End: 2025-06-22 | Stop reason: HOSPADM

## 2025-06-20 RX ORDER — ACETAMINOPHEN 325 MG/1
650 TABLET ORAL EVERY 6 HOURS PRN
Status: DISCONTINUED | OUTPATIENT
Start: 2025-06-20 | End: 2025-06-22 | Stop reason: HOSPADM

## 2025-06-20 RX ADMIN — PRAVASTATIN SODIUM 40 MG: 40 TABLET ORAL at 20:45

## 2025-06-20 RX ADMIN — SODIUM CHLORIDE 1000 ML: 0.9 INJECTION, SOLUTION INTRAVENOUS at 13:38

## 2025-06-20 RX ADMIN — SUCRALFATE 1 G: 1 TABLET ORAL at 20:39

## 2025-06-20 RX ADMIN — INSULIN GLARGINE 12 UNITS: 100 INJECTION, SOLUTION SUBCUTANEOUS at 20:40

## 2025-06-20 RX ADMIN — PANCRELIPASE LIPASE, PANCRELIPASE PROTEASE, PANCRELIPASE AMYLASE 60000 UNITS: 20000; 63000; 84000 CAPSULE, DELAYED RELEASE ORAL at 20:40

## 2025-06-20 RX ADMIN — SODIUM CHLORIDE 1000 ML: 0.9 INJECTION, SOLUTION INTRAVENOUS at 11:49

## 2025-06-20 RX ADMIN — MIDODRINE HYDROCHLORIDE 5 MG: 5 TABLET ORAL at 10:49

## 2025-06-20 RX ADMIN — FLUOXETINE HYDROCHLORIDE 40 MG: 20 CAPSULE ORAL at 17:45

## 2025-06-20 RX ADMIN — SODIUM CHLORIDE: 0.9 INJECTION, SOLUTION INTRAVENOUS at 17:59

## 2025-06-20 RX ADMIN — HYDROCODONE BITARTRATE AND ACETAMINOPHEN 1 TABLET: 5; 325 TABLET ORAL at 20:39

## 2025-06-20 RX ADMIN — DICYCLOMINE HYDROCHLORIDE 10 MG: 10 CAPSULE ORAL at 17:45

## 2025-06-20 ASSESSMENT — PAIN DESCRIPTION - LOCATION: LOCATION: HIP

## 2025-06-20 ASSESSMENT — PAIN DESCRIPTION - DESCRIPTORS: DESCRIPTORS: CRAMPING;DISCOMFORT

## 2025-06-20 ASSESSMENT — PAIN SCALES - GENERAL
PAINLEVEL_OUTOF10: 7
PAINLEVEL_OUTOF10: 4

## 2025-06-20 ASSESSMENT — PAIN DESCRIPTION - ORIENTATION: ORIENTATION: LEFT

## 2025-06-20 ASSESSMENT — PAIN - FUNCTIONAL ASSESSMENT: PAIN_FUNCTIONAL_ASSESSMENT: 0-10

## 2025-06-20 NOTE — ED PROVIDER NOTES
and collapse R55    Metabolic acidosis, increased anion gap E87.29    Bipolar depression ([facility]) F31.9    Closed fracture of left hip ([facility]) S72.002A    Abscess of left hip L02.416    Surgical wound infection T81.49XA    Staph aureus infection A49.01    Anemia D64.9    Hyponatremia E87.1    Surgical site infection T81.49XA    Post-op pain G89.18    Bipolar 1 disorder ([facility]) F31.9    Postoperative wound infection of left hip T81.49XA    S/P total left hip arthroplasty Z96.642    Prosthetic joint infection T84.50XA    MSSA (methicillin susceptible Staphylococcus aureus) infection A49.01    Prosthetic hip infection, initial encounter T84.59XA, Z96.649    Left hip pain M25.552    Wound dehiscence T81.30XA    Prosthetic hip infection T84.59XA, Z96.649    Postoperative infection T81.40XA    Chronic osteomyelitis of left femur with draining sinus ([facility]) M86.452    Hardware complicating wound infection T84.7XXA    Methicillin susceptible Staphylococcus aureus infection A49.01    Recurrent ventral incisional hernia K43.2    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    Pleural effusion J90    Morbid obesity ([facility]) E66.01    Migraine G43.909    Cerebrovascular accident (CVA) ([facility]) I63.9    Vitamin B12 deficiency anemia due to intrinsic factor deficiency D51.0    Presence of other vascular implants and grafts Z95.828    Pure hyperglyceridemia E78.1    Personal history of urinary (tract) infections Z87.440    Personal history of pneumonia (recurrent) Z87.01    Personal history of PE (pulmonary embolism) Z86.711    Personal history of other venous thrombosis and embolism Z86.718    Personal history of Methicillin resistant Staphylococcus aureus infection Z86.14    Other primary thrombophilia D68.59    Iron deficiency anemia D50.9    Type 2 diabetes mellitus with foot ulcer, with long-term current use of insulin ([facility]) E11.621, L97.509, Z79.4    Moderate malnutrition E44.0    Pressure ulcer of right buttock, stage 3 ([facility]) L89.313

## 2025-06-20 NOTE — FLOWSHEET NOTE
06/20/25 1748   Treatment Team Notification   Reason for Communication Evaluate   Name of Team Member Notified Dr. Estrada   Treatment Team Role Attending Provider   Method of Communication Secure Message   Response Waiting for response   Notification Time 1748     pain meds on hold. BP has rebounded. 141/58  pt states 7/10 Left side pain.

## 2025-06-20 NOTE — ED NOTES
Report given to PEBBLES Gerber from U.   Report method by phone   The following was reviewed with receiving RN:   Current vital signs:  BP (!) 119/46   Pulse 68   Temp 98.6 °F (37 °C) (Oral)   Resp 14   Ht 1.727 m (5' 8\")   Wt 81.6 kg (180 lb)   SpO2 100%   BMI 27.37 kg/m²                MEWS Score: 3     Any medication or safety alerts were reviewed. Any pending diagnostics and notifications were also reviewed, as well as any safety concerns or issues, abnormal labs, abnormal imaging, and abnormal assessment findings. Questions were answered.

## 2025-06-20 NOTE — ED TRIAGE NOTES
Mode of arrival (squad #, walk in, police, etc) : [facility]        Chief complaint(s):  Hypotension        Arrival Note (brief scenario, treatment PTA, etc).: Pt arrives to ED via EMS for hypotension. Per EMS home health called EMS d/t patients BP being low. EMS arrive to ED with prescription bottle for oxycodone that was filled 2 days ago for 42 pills and the bottle is empty. Patient reports taking one pill two days ago and three pills yesterday and is unsure where the remainder of the pills are. Patient denies any suicidal ideation. Patient denies chest pain, shortness of breath or any new pain. Patient reports taking one midodrine this morning PTA.

## 2025-06-20 NOTE — ED NOTES
Pt placed on external urinary catheter. Informed that patient can urinate whenever and educated on the need for urine specimen.

## 2025-06-20 NOTE — H&P
MD Arsen   vitamin D (CHOLECALCIFEROL) 50 MCG (2000 UT) CAPS capsule Take 1 capsule by mouth daily    ProviderArsen MD   FLUoxetine (PROZAC) 40 MG capsule Take 1 capsule by mouth daily 8/26/23   June Black MD   Syringe/Needle, Disp, (SYRINGE 3CC/25GX1\") 25G X 1\" 3 ML MISC To be used with B12 injections monthly 2/13/21   Sandy Rogel MD   pravastatin (PRAVACHOL) 40 MG tablet TAKE 1 TABLET(40 MG) BY MOUTH DAILY  Patient taking differently: Take 1 tablet by mouth at bedtime 6/26/20   Sandy Rogel MD   glucose monitoring kit (FREESTYLE) monitoring kit Testing twice a day.  Please dispense according to patients insurance. 12/20/19   Sandy Rogel MD   Insulin Pen Needle 31G X 5 MM MISC 1 each by Does not apply route daily 12/20/19   Sandy Rogel MD   Lancets 30G MISC Testing twice a day.  Please dispense according to patients insurance. 12/20/19   Sandy Rogel MD     Allergies:     Aripiprazole, Aspirin, Betadine [povidone iodine], Celebrex [celecoxib], Celexa [citalopram hydrobromide], Citalopram, Furosemide, Nitrofurantoin, Zosyn [piperacillin-tazobactam in dex], Tricyclic antidepressants, Codeine, Lithium, Paroxetine, Paxil [paroxetine hcl], Povidone iodine, Sertraline, Tape [adhesive tape], and Tegretol [carbamazepine]    Social History:     Tobacco:   reports that she has never smoked. She has never used smokeless tobacco.    Alcohol:   reports that she does not currently use alcohol.    Drug Use:   reports no history of drug use.    Family History:     Family History   Problem Relation Age of Onset    Depression Mother     High Blood Pressure Mother     High Cholesterol Mother     Diabetes Father     Heart Disease Father     Kidney Disease Father     High Blood Pressure Father     High Cholesterol Father     Stroke Father     No Known Problems Brother     Breast Cancer Maternal Aunt     Cancer Maternal Uncle         of duodenum had whipple    Breast

## 2025-06-20 NOTE — ED NOTES
Report given to PEBBLES Morillo from ED.   Report method in person   The following was reviewed with receiving RN:   Current vital signs:  BP (!) 77/31   Pulse 59   Temp 98.6 °F (37 °C) (Oral)   Resp 15   Ht 1.727 m (5' 8\")   Wt 81.6 kg (180 lb)   SpO2 99%   BMI 27.37 kg/m²                MEWS Score: 3     Any medication or safety alerts were reviewed. Any pending diagnostics and notifications were also reviewed, as well as any safety concerns or issues, abnormal labs, abnormal imaging, and abnormal assessment findings. Questions were answered.

## 2025-06-21 LAB
ANION GAP SERPL CALCULATED.3IONS-SCNC: 11 MMOL/L (ref 9–16)
BASOPHILS # BLD: 0.05 K/UL (ref 0–0.2)
BASOPHILS NFR BLD: 1 % (ref 0–2)
BUN SERPL-MCNC: 10 MG/DL (ref 6–20)
CALCIUM SERPL-MCNC: 8.1 MG/DL (ref 8.6–10.4)
CHLORIDE SERPL-SCNC: 109 MMOL/L (ref 98–107)
CO2 SERPL-SCNC: 16 MMOL/L (ref 20–31)
CREAT SERPL-MCNC: 0.5 MG/DL (ref 0.7–1.2)
CRP SERPL HS-MCNC: 26.1 MG/L (ref 0–5)
EOSINOPHIL # BLD: 0.2 K/UL (ref 0–0.44)
EOSINOPHILS RELATIVE PERCENT: 3 % (ref 0–4)
ERYTHROCYTE [DISTWIDTH] IN BLOOD BY AUTOMATED COUNT: 15 % (ref 11.5–14.9)
GFR, ESTIMATED: >90 ML/MIN/1.73M2
GLUCOSE BLD-MCNC: 123 MG/DL (ref 65–105)
GLUCOSE BLD-MCNC: 224 MG/DL (ref 65–105)
GLUCOSE BLD-MCNC: 253 MG/DL (ref 65–105)
GLUCOSE BLD-MCNC: 260 MG/DL (ref 65–105)
GLUCOSE SERPL-MCNC: 287 MG/DL (ref 74–99)
HCT VFR BLD AUTO: 29.4 % (ref 36–46)
HGB BLD-MCNC: 9.2 G/DL (ref 12–16)
IMM GRANULOCYTES # BLD AUTO: <0.03 K/UL (ref 0–0.3)
IMM GRANULOCYTES NFR BLD: 0 %
LYMPHOCYTES NFR BLD: 0.84 K/UL (ref 1.1–3.7)
LYMPHOCYTES RELATIVE PERCENT: 13 % (ref 24–44)
MCH RBC QN AUTO: 29.6 PG (ref 26–34)
MCHC RBC AUTO-ENTMCNC: 31.3 G/DL (ref 31–37)
MCV RBC AUTO: 94.5 FL (ref 80–100)
MONOCYTES NFR BLD: 0.49 K/UL (ref 0.1–1.2)
MONOCYTES NFR BLD: 8 % (ref 3–12)
NEUTROPHILS NFR BLD: 75 % (ref 36–66)
NEUTS SEG NFR BLD: 4.79 K/UL (ref 1.5–8.1)
NRBC BLD-RTO: 0 PER 100 WBC
PLATELET # BLD AUTO: 247 K/UL (ref 150–450)
PMV BLD AUTO: 12.5 FL (ref 8–13.5)
POTASSIUM SERPL-SCNC: 4.4 MMOL/L (ref 3.7–5.3)
RBC # BLD AUTO: 3.11 M/UL (ref 3.95–5.11)
SODIUM SERPL-SCNC: 136 MMOL/L (ref 136–145)
WBC OTHER # BLD: 6.4 K/UL (ref 3.5–11)

## 2025-06-21 PROCEDURE — 85025 COMPLETE CBC W/AUTO DIFF WBC: CPT

## 2025-06-21 PROCEDURE — 80048 BASIC METABOLIC PNL TOTAL CA: CPT

## 2025-06-21 PROCEDURE — 86140 C-REACTIVE PROTEIN: CPT

## 2025-06-21 PROCEDURE — 99223 1ST HOSP IP/OBS HIGH 75: CPT

## 2025-06-21 PROCEDURE — 2500000003 HC RX 250 WO HCPCS

## 2025-06-21 PROCEDURE — 6360000002 HC RX W HCPCS

## 2025-06-21 PROCEDURE — 1200000000 HC SEMI PRIVATE

## 2025-06-21 PROCEDURE — 36415 COLL VENOUS BLD VENIPUNCTURE: CPT

## 2025-06-21 PROCEDURE — 6370000000 HC RX 637 (ALT 250 FOR IP): Performed by: NURSE PRACTITIONER

## 2025-06-21 PROCEDURE — 2580000003 HC RX 258

## 2025-06-21 PROCEDURE — 6370000000 HC RX 637 (ALT 250 FOR IP)

## 2025-06-21 PROCEDURE — 82947 ASSAY GLUCOSE BLOOD QUANT: CPT

## 2025-06-21 RX ORDER — OXYCODONE HYDROCHLORIDE 5 MG/1
2.5 TABLET ORAL ONCE
Status: COMPLETED | OUTPATIENT
Start: 2025-06-21 | End: 2025-06-21

## 2025-06-21 RX ORDER — HYDROCODONE BITARTRATE AND ACETAMINOPHEN 5; 325 MG/1; MG/1
1 TABLET ORAL ONCE
Refills: 0 | Status: COMPLETED | OUTPATIENT
Start: 2025-06-21 | End: 2025-06-21

## 2025-06-21 RX ORDER — OXYCODONE HYDROCHLORIDE 5 MG/1
2.5 TABLET ORAL EVERY 8 HOURS PRN
Refills: 0 | Status: DISCONTINUED | OUTPATIENT
Start: 2025-06-21 | End: 2025-06-22 | Stop reason: HOSPADM

## 2025-06-21 RX ADMIN — INSULIN LISPRO 2 UNITS: 100 INJECTION, SOLUTION INTRAVENOUS; SUBCUTANEOUS at 16:12

## 2025-06-21 RX ADMIN — SODIUM CHLORIDE, PRESERVATIVE FREE 10 ML: 5 INJECTION INTRAVENOUS at 09:49

## 2025-06-21 RX ADMIN — SUCRALFATE 1 G: 1 TABLET ORAL at 09:49

## 2025-06-21 RX ADMIN — PRAVASTATIN SODIUM 40 MG: 40 TABLET ORAL at 20:53

## 2025-06-21 RX ADMIN — FONDAPARINUX SODIUM 10 MG: 10 INJECTION, SOLUTION SUBCUTANEOUS at 09:48

## 2025-06-21 RX ADMIN — SUCRALFATE 1 G: 1 TABLET ORAL at 11:47

## 2025-06-21 RX ADMIN — DICYCLOMINE HYDROCHLORIDE 10 MG: 10 CAPSULE ORAL at 05:42

## 2025-06-21 RX ADMIN — SODIUM CHLORIDE: 0.9 INJECTION, SOLUTION INTRAVENOUS at 22:11

## 2025-06-21 RX ADMIN — INSULIN GLARGINE 12 UNITS: 100 INJECTION, SOLUTION SUBCUTANEOUS at 20:53

## 2025-06-21 RX ADMIN — PANTOPRAZOLE SODIUM 40 MG: 40 TABLET, DELAYED RELEASE ORAL at 05:42

## 2025-06-21 RX ADMIN — INSULIN LISPRO 1 UNITS: 100 INJECTION, SOLUTION INTRAVENOUS; SUBCUTANEOUS at 11:45

## 2025-06-21 RX ADMIN — PANCRELIPASE LIPASE, PANCRELIPASE PROTEASE, PANCRELIPASE AMYLASE 60000 UNITS: 20000; 63000; 84000 CAPSULE, DELAYED RELEASE ORAL at 16:14

## 2025-06-21 RX ADMIN — SODIUM CHLORIDE: 0.9 INJECTION, SOLUTION INTRAVENOUS at 02:30

## 2025-06-21 RX ADMIN — HYDROCODONE BITARTRATE AND ACETAMINOPHEN 1 TABLET: 5; 325 TABLET ORAL at 10:49

## 2025-06-21 RX ADMIN — HYDROCODONE BITARTRATE AND ACETAMINOPHEN 1 TABLET: 5; 325 TABLET ORAL at 01:02

## 2025-06-21 RX ADMIN — FLUOXETINE HYDROCHLORIDE 40 MG: 20 CAPSULE ORAL at 09:49

## 2025-06-21 RX ADMIN — SODIUM CHLORIDE: 0.9 INJECTION, SOLUTION INTRAVENOUS at 12:36

## 2025-06-21 RX ADMIN — OXYCODONE HYDROCHLORIDE 2.5 MG: 5 TABLET ORAL at 20:08

## 2025-06-21 RX ADMIN — SUCRALFATE 1 G: 1 TABLET ORAL at 20:53

## 2025-06-21 RX ADMIN — PANCRELIPASE LIPASE, PANCRELIPASE PROTEASE, PANCRELIPASE AMYLASE 60000 UNITS: 20000; 63000; 84000 CAPSULE, DELAYED RELEASE ORAL at 11:46

## 2025-06-21 RX ADMIN — OXYCODONE HYDROCHLORIDE 2.5 MG: 5 TABLET ORAL at 16:13

## 2025-06-21 RX ADMIN — SUCRALFATE 1 G: 1 TABLET ORAL at 16:13

## 2025-06-21 RX ADMIN — DICYCLOMINE HYDROCHLORIDE 10 MG: 10 CAPSULE ORAL at 16:13

## 2025-06-21 RX ADMIN — BUPROPION HYDROCHLORIDE 300 MG: 300 TABLET, EXTENDED RELEASE ORAL at 09:49

## 2025-06-21 RX ADMIN — DICYCLOMINE HYDROCHLORIDE 10 MG: 10 CAPSULE ORAL at 09:49

## 2025-06-21 RX ADMIN — PANCRELIPASE LIPASE, PANCRELIPASE PROTEASE, PANCRELIPASE AMYLASE 60000 UNITS: 20000; 63000; 84000 CAPSULE, DELAYED RELEASE ORAL at 09:48

## 2025-06-21 RX ADMIN — INSULIN LISPRO 2 UNITS: 100 INJECTION, SOLUTION INTRAVENOUS; SUBCUTANEOUS at 20:53

## 2025-06-21 ASSESSMENT — PAIN DESCRIPTION - ORIENTATION
ORIENTATION: LEFT

## 2025-06-21 ASSESSMENT — PAIN DESCRIPTION - LOCATION
LOCATION: HIP
LOCATION: HIP
LOCATION: FLANK
LOCATION: HIP
LOCATION: HIP

## 2025-06-21 ASSESSMENT — PAIN DESCRIPTION - DESCRIPTORS
DESCRIPTORS: ACHING;DISCOMFORT
DESCRIPTORS: ACHING;DISCOMFORT

## 2025-06-21 ASSESSMENT — PAIN SCALES - WONG BAKER: WONGBAKER_NUMERICALRESPONSE: NO HURT

## 2025-06-21 ASSESSMENT — PAIN SCALES - GENERAL
PAINLEVEL_OUTOF10: 7
PAINLEVEL_OUTOF10: 7
PAINLEVEL_OUTOF10: 0
PAINLEVEL_OUTOF10: 5

## 2025-06-21 ASSESSMENT — PAIN DESCRIPTION - PAIN TYPE: TYPE: CHRONIC PAIN

## 2025-06-21 ASSESSMENT — PAIN - FUNCTIONAL ASSESSMENT
PAIN_FUNCTIONAL_ASSESSMENT: PREVENTS OR INTERFERES WITH ALL ACTIVE AND SOME PASSIVE ACTIVITIES
PAIN_FUNCTIONAL_ASSESSMENT: PREVENTS OR INTERFERES WITH ALL ACTIVE AND SOME PASSIVE ACTIVITIES

## 2025-06-22 VITALS
WEIGHT: 180 LBS | DIASTOLIC BLOOD PRESSURE: 68 MMHG | TEMPERATURE: 98.2 F | OXYGEN SATURATION: 100 % | SYSTOLIC BLOOD PRESSURE: 137 MMHG | HEIGHT: 68 IN | RESPIRATION RATE: 17 BRPM | HEART RATE: 72 BPM | BODY MASS INDEX: 27.28 KG/M2

## 2025-06-22 LAB
ANION GAP SERPL CALCULATED.3IONS-SCNC: 11 MMOL/L (ref 9–16)
BUN SERPL-MCNC: 8 MG/DL (ref 6–20)
CALCIUM SERPL-MCNC: 8.5 MG/DL (ref 8.6–10.4)
CHLORIDE SERPL-SCNC: 110 MMOL/L (ref 98–107)
CO2 SERPL-SCNC: 17 MMOL/L (ref 20–31)
CREAT SERPL-MCNC: 0.4 MG/DL (ref 0.7–1.2)
GFR, ESTIMATED: >90 ML/MIN/1.73M2
GLUCOSE BLD-MCNC: 154 MG/DL (ref 65–105)
GLUCOSE SERPL-MCNC: 145 MG/DL (ref 74–99)
POTASSIUM SERPL-SCNC: 4.1 MMOL/L (ref 3.7–5.3)
SODIUM SERPL-SCNC: 138 MMOL/L (ref 136–145)

## 2025-06-22 PROCEDURE — 36415 COLL VENOUS BLD VENIPUNCTURE: CPT

## 2025-06-22 PROCEDURE — 6360000002 HC RX W HCPCS

## 2025-06-22 PROCEDURE — 99239 HOSP IP/OBS DSCHRG MGMT >30: CPT

## 2025-06-22 PROCEDURE — 6370000000 HC RX 637 (ALT 250 FOR IP)

## 2025-06-22 PROCEDURE — 80048 BASIC METABOLIC PNL TOTAL CA: CPT

## 2025-06-22 PROCEDURE — 2500000003 HC RX 250 WO HCPCS

## 2025-06-22 PROCEDURE — 82947 ASSAY GLUCOSE BLOOD QUANT: CPT

## 2025-06-22 RX ORDER — VALSARTAN 40 MG/1
40 TABLET ORAL DAILY
Status: DISCONTINUED | OUTPATIENT
Start: 2025-06-23 | End: 2025-06-22 | Stop reason: HOSPADM

## 2025-06-22 RX ORDER — METOPROLOL TARTRATE 25 MG/1
25 TABLET, FILM COATED ORAL 2 TIMES DAILY
Qty: 60 TABLET | Refills: 0 | Status: SHIPPED | OUTPATIENT
Start: 2025-06-22

## 2025-06-22 RX ORDER — VALSARTAN 40 MG/1
40 TABLET ORAL DAILY
Qty: 30 TABLET | Refills: 3 | Status: SHIPPED | OUTPATIENT
Start: 2025-06-23

## 2025-06-22 RX ADMIN — INSULIN LISPRO 2 UNITS: 100 INJECTION, SOLUTION INTRAVENOUS; SUBCUTANEOUS at 08:36

## 2025-06-22 RX ADMIN — OXYCODONE HYDROCHLORIDE 2.5 MG: 5 TABLET ORAL at 01:12

## 2025-06-22 RX ADMIN — FLUOXETINE HYDROCHLORIDE 40 MG: 20 CAPSULE ORAL at 08:36

## 2025-06-22 RX ADMIN — PANTOPRAZOLE SODIUM 40 MG: 40 TABLET, DELAYED RELEASE ORAL at 05:45

## 2025-06-22 RX ADMIN — SUCRALFATE 1 G: 1 TABLET ORAL at 12:14

## 2025-06-22 RX ADMIN — OXYCODONE HYDROCHLORIDE 2.5 MG: 5 TABLET ORAL at 10:17

## 2025-06-22 RX ADMIN — FONDAPARINUX SODIUM 10 MG: 10 INJECTION, SOLUTION SUBCUTANEOUS at 08:40

## 2025-06-22 RX ADMIN — DICYCLOMINE HYDROCHLORIDE 10 MG: 10 CAPSULE ORAL at 05:45

## 2025-06-22 RX ADMIN — PANCRELIPASE LIPASE, PANCRELIPASE PROTEASE, PANCRELIPASE AMYLASE 60000 UNITS: 20000; 63000; 84000 CAPSULE, DELAYED RELEASE ORAL at 08:38

## 2025-06-22 RX ADMIN — BUPROPION HYDROCHLORIDE 300 MG: 300 TABLET, EXTENDED RELEASE ORAL at 08:36

## 2025-06-22 RX ADMIN — SODIUM CHLORIDE, PRESERVATIVE FREE 10 ML: 5 INJECTION INTRAVENOUS at 08:39

## 2025-06-22 RX ADMIN — SUCRALFATE 1 G: 1 TABLET ORAL at 08:36

## 2025-06-22 RX ADMIN — PANCRELIPASE LIPASE, PANCRELIPASE PROTEASE, PANCRELIPASE AMYLASE 60000 UNITS: 20000; 63000; 84000 CAPSULE, DELAYED RELEASE ORAL at 12:08

## 2025-06-22 ASSESSMENT — PAIN DESCRIPTION - ORIENTATION
ORIENTATION: LEFT
ORIENTATION: LEFT

## 2025-06-22 ASSESSMENT — PAIN DESCRIPTION - LOCATION
LOCATION: HIP
LOCATION: HIP

## 2025-06-22 ASSESSMENT — PAIN SCALES - WONG BAKER: WONGBAKER_NUMERICALRESPONSE: HURTS A LITTLE BIT

## 2025-06-22 ASSESSMENT — PAIN SCALES - GENERAL
PAINLEVEL_OUTOF10: 7
PAINLEVEL_OUTOF10: 7
PAINLEVEL_OUTOF10: 4

## 2025-06-22 ASSESSMENT — PAIN DESCRIPTION - DESCRIPTORS: DESCRIPTORS: ACHING;SHARP

## 2025-06-22 NOTE — PLAN OF CARE
Problem: Chronic Conditions and Co-morbidities  Goal: Patient's chronic conditions and co-morbidity symptoms are monitored and maintained or improved  6/21/2025 1538 by Jeff Mora RN  Outcome: Progressing     Problem: Discharge Planning  Goal: Discharge to home or other facility with appropriate resources  6/21/2025 1538 by Jeff Mora RN  Outcome: Progressing     Problem: Pain  Goal: Verbalizes/displays adequate comfort level or baseline comfort level  6/21/2025 1538 by Jeff Mora RN  Outcome: Progressing     Problem: Skin/Tissue Integrity  Goal: Skin integrity remains intact  Description: 1.  Monitor for areas of redness and/or skin breakdown  2.  Assess vascular access sites hourly  3.  Every 4-6 hours minimum:  Change oxygen saturation probe site  4.  Every 4-6 hours:  If on nasal continuous positive airway pressure, respiratory therapy assess nares and determine need for appliance change or resting period  6/21/2025 1538 by Jeff Mora RN  Outcome: Progressing     Problem: ABCDS Injury Assessment  Goal: Absence of physical injury  6/21/2025 1538 by Jeff Mora RN  Outcome: Progressing     Problem: Safety - Adult  Goal: Free from fall injury  6/21/2025 1538 by Jeff Mora RN  Outcome: Progressing     Problem: Nutrition Deficit:  Goal: Optimize nutritional status  Outcome: Progressing     
  Problem: Chronic Conditions and Co-morbidities  Goal: Patient's chronic conditions and co-morbidity symptoms are monitored and maintained or improved  6/22/2025 0441 by Yuliet Navarrete RN  Outcome: Progressing     Problem: Discharge Planning  Goal: Discharge to home or other facility with appropriate resources  6/22/2025 0441 by Yuliet Navarrete RN  Outcome: Progressing     Problem: Pain  Goal: Verbalizes/displays adequate comfort level or baseline comfort level  6/22/2025 0441 by Yuliet Navarrete RN  Outcome: Progressing     Problem: Skin/Tissue Integrity  Goal: Skin integrity remains intact  Description: 1.  Monitor for areas of redness and/or skin breakdown  2.  Assess vascular access sites hourly  3.  Every 4-6 hours minimum:  Change oxygen saturation probe site  4.  Every 4-6 hours:  If on nasal continuous positive airway pressure, respiratory therapy assess nares and determine need for appliance change or resting period  6/22/2025 0441 by Yulite Navarrete RN  Outcome: Progressing     Problem: ABCDS Injury Assessment  Goal: Absence of physical injury  6/22/2025 0441 by Yuliet Navarrete RN  Outcome: Progressing     Problem: Safety - Adult  Goal: Free from fall injury  6/22/2025 0441 by Yuliet Navarrete RN  Outcome: Progressing     Problem: Nutrition Deficit:  Goal: Optimize nutritional status  6/22/2025 0441 by Yuliet Navarrete RN  Outcome: Progressing     
  Problem: Chronic Conditions and Co-morbidities  Goal: Patient's chronic conditions and co-morbidity symptoms are monitored and maintained or improved  Outcome: Progressing  Flowsheets (Taken 6/21/2025 0333)  Care Plan - Patient's Chronic Conditions and Co-Morbidity Symptoms are Monitored and Maintained or Improved:   Monitor and assess patient's chronic conditions and comorbid symptoms for stability, deterioration, or improvement   Collaborate with multidisciplinary team to address chronic and comorbid conditions and prevent exacerbation or deterioration     Problem: Discharge Planning  Goal: Discharge to home or other facility with appropriate resources  Outcome: Progressing  Flowsheets (Taken 6/21/2025 0333)  Discharge to home or other facility with appropriate resources:   Identify barriers to discharge with patient and caregiver   Arrange for needed discharge resources and transportation as appropriate   Refer to discharge planning if patient needs post-hospital services based on physician order or complex needs related to functional status, cognitive ability or social support system   Identify discharge learning needs (meds, wound care, etc)     Problem: Pain  Goal: Verbalizes/displays adequate comfort level or baseline comfort level  Outcome: Progressing  Flowsheets (Taken 6/21/2025 0333)  Verbalizes/displays adequate comfort level or baseline comfort level:   Encourage patient to monitor pain and request assistance   Administer analgesics based on type and severity of pain and evaluate response   Consider cultural and social influences on pain and pain management   Assess pain using appropriate pain scale   Implement non-pharmacological measures as appropriate and evaluate response   Notify Licensed Independent Practitioner if interventions unsuccessful or patient reports new pain  Note: Patient has received 2 1X doses of norco for pain relief. Patient is satisfied with pain relief.      Problem: 
No

## 2025-06-22 NOTE — DISCHARGE INSTRUCTIONS
Follow up with your primary care physician,  Arielle Melton APRN - NP, in 1 week  Follow up with gastroenterology, Yuli Bernabe MD, in 1 week  Follow-up with infectious disease, Jerome Flannery MD, in 1 week   Take all your medication as prescribed Lopressor 25 mg (take 1 tablet by mouth 2 times daily), pravastatin 40 mg (take 1 tablet by mouth daily), take valsartan 40 mg (take 1 tablet by mouth daily). If your prescription has refills, obtain the medication refills from the pharmacy before you run out. Seek medical attention if you run out of a medication you need and do not have any refills of.   Reasons to call your doctor or go to the ER: Nausea, vomiting, dizziness, lightheadedness, shortness of breath, chest pain, headache, or any other concerning symptoms.  Stop taking Lopressor 50 mg 1 tablet 2 times daily, now you will take Lopressor as 25 mg 1 tablet 2 times daily  Take valsartan as 40 mg 1 tablet daily

## 2025-06-23 ENCOUNTER — CARE COORDINATION (OUTPATIENT)
Dept: CARE COORDINATION | Age: 45
End: 2025-06-23

## 2025-06-23 NOTE — CARE COORDINATION
Spoke with Rita Cruz RN, CTN for [facility], patient is enrolled in their care transition program and being followed currently.  Spoke with CTN nurse about patient's readmission, they have all needed information and are reaching out to follow up with patient again today.

## 2025-06-23 NOTE — DISCHARGE SUMMARY
IN-PATIENT SERVICE   [facility] Internal Medicine    Discharge Summary     Patient ID: Krista Chiang  :  1980   MRN: [id number]     ACCOUNT:  [id number]   Patient's PCP: Arielle Melton APRN - NP  Admit Date: 2025   Discharge Date: 2025    Length of Stay: 6  Code Status:  Prior  Admitting Physician: Wilner Ash MD  Discharge Physician: Crystal Estrada MD     Active Discharge Diagnoses:     Primary Problem  Community acquired bacterial pneumonia      Hospital Problems  Active Hospital Problems    Diagnosis Date Noted    Alcohol dependence in remission (HCC) [F10.21] 2015     Priority: Medium    Pancreatic cyst [K86.2] 2025    Allergy to antibiotic [Z88.1] 2025    Hypoglycemia [E16.2] 2025    Community acquired bacterial pneumonia [J15.9] 2025    Anastomotic ulcer S/P gastric bypass [K28.9] 2025    Nausea and vomiting [R11.2] 2025    Moderate malnutrition [E44.0] 2023    Pneumonia due to infectious organism [J18.9] 2022    S/P gastric bypass [Z98.84] 2019    Antiphospholipid syndrome [D68.61]     Essential hypertension [I10] 10/07/2015    Type 2 diabetes mellitus with diabetic polyneuropathy, with long-term current use of insulin (HCC) [E11.42, Z79.4] 2015    Primary hypercoagulable state [D68.59] 2014       Admission Condition:  fair     Discharged Condition: fair    Hospital Stay:     Hospital Course:  Krista Chiang is a 45 y.o. female who was admitted for the management of Community acquired bacterial pneumonia , presented to ER with Hypoglycemia    Krista Chiang is a 44 y.o. Non- / non  female who presents with Hypoglycemia   and is admitted to the hospital for the management of Community acquired bacterial pneumonia.      Patient presented to ED per EMS for low blood sugar with glucose level 20.  Apparently she was awake enough to eat a few bites of food and drink which brought

## 2025-06-24 LAB
EKG ATRIAL RATE: 61 BPM
EKG P AXIS: 29 DEGREES
EKG P-R INTERVAL: 126 MS
EKG Q-T INTERVAL: 490 MS
EKG QRS DURATION: 84 MS
EKG QTC CALCULATION (BAZETT): 493 MS
EKG R AXIS: 50 DEGREES
EKG T AXIS: 35 DEGREES
EKG VENTRICULAR RATE: 61 BPM

## 2025-06-27 NOTE — PROGRESS NOTES
suspected   aspiration pneumonia ruled out.    Query created by: LIAM SAHNI on 6/25/2025 9:12 AM      Electronically signed by:  Crystal Estrada MD 6/27/2025 7:14 AM

## 2025-07-09 ENCOUNTER — TELEPHONE (OUTPATIENT)
Dept: GASTROENTEROLOGY | Age: 45
End: 2025-07-09

## 2025-07-16 ENCOUNTER — APPOINTMENT (OUTPATIENT)
Dept: GENERAL RADIOLOGY | Age: 45
End: 2025-07-16
Payer: MEDICARE

## 2025-07-16 ENCOUNTER — APPOINTMENT (OUTPATIENT)
Dept: CT IMAGING | Age: 45
End: 2025-07-16
Payer: MEDICARE

## 2025-07-16 ENCOUNTER — HOSPITAL ENCOUNTER (INPATIENT)
Age: 45
LOS: 1 days | Discharge: HOME HEALTH | End: 2025-07-18
Attending: EMERGENCY MEDICINE | Admitting: INTERNAL MEDICINE
Payer: MEDICARE

## 2025-07-16 DIAGNOSIS — E16.2 HYPOGLYCEMIA: Primary | ICD-10-CM
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
## 2025-07-16 LAB
GLUCOSE BLD-MCNC: 92 MG/DL
GLUCOSE BLD-MCNC: 92 MG/DL (ref 65–105)

## 2025-07-16 PROCEDURE — 82947 ASSAY GLUCOSE BLOOD QUANT: CPT

## 2025-07-16 PROCEDURE — 70450 CT HEAD/BRAIN W/O DYE: CPT

## 2025-07-16 PROCEDURE — 72125 CT NECK SPINE W/O DYE: CPT

## 2025-07-16 PROCEDURE — 99285 EMERGENCY DEPT VISIT HI MDM: CPT

## 2025-07-16 PROCEDURE — 73502 X-RAY EXAM HIP UNI 2-3 VIEWS: CPT

## 2025-07-16 RX ORDER — ONDANSETRON 2 MG/ML
4 INJECTION INTRAMUSCULAR; INTRAVENOUS ONCE
Status: COMPLETED | OUTPATIENT
Start: 2025-07-16 | End: 2025-07-17

## 2025-07-16 ASSESSMENT — ENCOUNTER SYMPTOMS
FACIAL SWELLING: 0
CHEST TIGHTNESS: 0
COLOR CHANGE: 0
SHORTNESS OF BREATH: 0
EYE PAIN: 0
TROUBLE SWALLOWING: 0
PHOTOPHOBIA: 0
ABDOMINAL PAIN: 0
VOICE CHANGE: 0
VOMITING: 1
NAUSEA: 1
BACK PAIN: 0

## 2025-07-16 ASSESSMENT — PAIN DESCRIPTION - LOCATION: LOCATION: HIP

## 2025-07-16 ASSESSMENT — LIFESTYLE VARIABLES
HOW OFTEN DO YOU HAVE A DRINK CONTAINING ALCOHOL: NEVER
HOW MANY STANDARD DRINKS CONTAINING ALCOHOL DO YOU HAVE ON A TYPICAL DAY: PATIENT DOES NOT DRINK
HOW MANY STANDARD DRINKS CONTAINING ALCOHOL DO YOU HAVE ON A TYPICAL DAY: PATIENT DOES NOT DRINK
HOW OFTEN DO YOU HAVE A DRINK CONTAINING ALCOHOL: NEVER

## 2025-07-16 ASSESSMENT — PAIN - FUNCTIONAL ASSESSMENT: PAIN_FUNCTIONAL_ASSESSMENT: 0-10

## 2025-07-16 ASSESSMENT — PAIN SCALES - GENERAL: PAINLEVEL_OUTOF10: 8

## 2025-07-16 ASSESSMENT — PAIN DESCRIPTION - ORIENTATION: ORIENTATION: LEFT

## 2025-07-17 LAB
ALBUMIN SERPL-MCNC: 3.9 G/DL (ref 3.5–5.2)
ALP SERPL-CCNC: 183 U/L (ref 35–104)
ALT SERPL-CCNC: 34 U/L (ref 10–35)
ANION GAP SERPL CALCULATED.3IONS-SCNC: 14 MMOL/L (ref 9–16)
ANION GAP SERPL CALCULATED.3IONS-SCNC: 16 MMOL/L (ref 9–16)
AST SERPL-CCNC: 19 U/L (ref 10–35)
BASOPHILS # BLD: 0.03 K/UL (ref 0–0.2)
BASOPHILS # BLD: 0.06 K/UL (ref 0–0.2)
BASOPHILS NFR BLD: 1 % (ref 0–2)
BASOPHILS NFR BLD: 1 % (ref 0–2)
BILIRUB DIRECT SERPL-MCNC: <0.1 MG/DL (ref 0–0.3)
BILIRUB INDIRECT SERPL-MCNC: ABNORMAL MG/DL (ref 0–1)
BILIRUB SERPL-MCNC: <0.2 MG/DL (ref 0–1.2)
BILIRUB UR QL STRIP: NEGATIVE
BUN SERPL-MCNC: 16 MG/DL (ref 6–20)
BUN SERPL-MCNC: 21 MG/DL (ref 6–20)
CALCIUM SERPL-MCNC: 8.8 MG/DL (ref 8.6–10.4)
CALCIUM SERPL-MCNC: 9.4 MG/DL (ref 8.6–10.4)
CHLORIDE SERPL-SCNC: 108 MMOL/L (ref 98–107)
CHLORIDE SERPL-SCNC: 110 MMOL/L (ref 98–107)
CLARITY UR: CLEAR
CO2 SERPL-SCNC: 17 MMOL/L (ref 20–31)
CO2 SERPL-SCNC: 20 MMOL/L (ref 20–31)
COLOR UR: YELLOW
COMMENT: ABNORMAL
CREAT SERPL-MCNC: 0.4 MG/DL (ref 0.7–1.2)
CREAT SERPL-MCNC: 0.5 MG/DL (ref 0.7–1.2)
EOSINOPHIL # BLD: 0.11 K/UL (ref 0–0.44)
EOSINOPHIL # BLD: 0.15 K/UL (ref 0–0.44)
EOSINOPHILS RELATIVE PERCENT: 2 % (ref 0–4)
EOSINOPHILS RELATIVE PERCENT: 2 % (ref 0–4)
ERYTHROCYTE [DISTWIDTH] IN BLOOD BY AUTOMATED COUNT: 15.7 % (ref 11.5–14.9)
ERYTHROCYTE [DISTWIDTH] IN BLOOD BY AUTOMATED COUNT: 15.7 % (ref 11.5–14.9)
GFR, ESTIMATED: >90 ML/MIN/1.73M2
GFR, ESTIMATED: >90 ML/MIN/1.73M2
GLUCOSE BLD-MCNC: 115 MG/DL (ref 65–105)
GLUCOSE BLD-MCNC: 157 MG/DL (ref 65–105)
GLUCOSE BLD-MCNC: 162 MG/DL (ref 65–105)
GLUCOSE BLD-MCNC: 182 MG/DL (ref 65–105)
GLUCOSE BLD-MCNC: 233 MG/DL (ref 65–105)
GLUCOSE BLD-MCNC: 246 MG/DL (ref 65–105)
GLUCOSE BLD-MCNC: 27 MG/DL (ref 65–105)
GLUCOSE BLD-MCNC: 49 MG/DL (ref 65–105)
GLUCOSE BLD-MCNC: 53 MG/DL (ref 65–105)
GLUCOSE BLD-MCNC: 70 MG/DL (ref 65–105)
GLUCOSE BLD-MCNC: 83 MG/DL
GLUCOSE BLD-MCNC: 83 MG/DL (ref 65–105)
GLUCOSE BLD-MCNC: 88 MG/DL (ref 65–105)
GLUCOSE SERPL-MCNC: 104 MG/DL (ref 74–99)
GLUCOSE SERPL-MCNC: 52 MG/DL (ref 74–99)
GLUCOSE UR STRIP-MCNC: ABNORMAL MG/DL
HCT VFR BLD AUTO: 38.7 % (ref 36–46)
HCT VFR BLD AUTO: 41 % (ref 36–46)
HGB BLD-MCNC: 11.7 G/DL (ref 12–16)
HGB BLD-MCNC: 12.4 G/DL (ref 12–16)
HGB UR QL STRIP.AUTO: NEGATIVE
IMM GRANULOCYTES # BLD AUTO: 0.04 K/UL (ref 0–0.3)
IMM GRANULOCYTES # BLD AUTO: <0.03 K/UL (ref 0–0.3)
IMM GRANULOCYTES NFR BLD: 0 %
IMM GRANULOCYTES NFR BLD: 0 %
INR PPP: 0.8
KETONES UR STRIP-MCNC: NEGATIVE MG/DL
LEUKOCYTE ESTERASE UR QL STRIP: NEGATIVE
LIPASE SERPL-CCNC: 9 U/L (ref 13–60)
LYMPHOCYTES NFR BLD: 1.56 K/UL (ref 1.1–3.7)
LYMPHOCYTES NFR BLD: 2.11 K/UL (ref 1.1–3.7)
LYMPHOCYTES RELATIVE PERCENT: 23 % (ref 24–44)
LYMPHOCYTES RELATIVE PERCENT: 28 % (ref 24–44)
MCH RBC QN AUTO: 26.5 PG (ref 26–34)
MCH RBC QN AUTO: 26.6 PG (ref 26–34)
MCHC RBC AUTO-ENTMCNC: 30.2 G/DL (ref 31–37)
MCHC RBC AUTO-ENTMCNC: 30.2 G/DL (ref 31–37)
MCV RBC AUTO: 87.6 FL (ref 80–100)
MCV RBC AUTO: 88 FL (ref 80–100)
MONOCYTES NFR BLD: 0.41 K/UL (ref 0.1–1.2)
MONOCYTES NFR BLD: 0.65 K/UL (ref 0.1–1.2)
MONOCYTES NFR BLD: 7 % (ref 3–12)
MONOCYTES NFR BLD: 7 % (ref 3–12)
NEUTROPHILS NFR BLD: 62 % (ref 36–66)
NEUTROPHILS NFR BLD: 67 % (ref 36–66)
NEUTS SEG NFR BLD: 3.4 K/UL (ref 1.5–8.1)
NEUTS SEG NFR BLD: 6.24 K/UL (ref 1.5–8.1)
NITRITE UR QL STRIP: NEGATIVE
NRBC BLD-RTO: 0 PER 100 WBC
NRBC BLD-RTO: 0 PER 100 WBC
PH UR STRIP: 6 [PH] (ref 5–8)
PLATELET # BLD AUTO: 244 K/UL (ref 150–450)
PLATELET # BLD AUTO: 423 K/UL (ref 150–450)
PMV BLD AUTO: 11.3 FL (ref 8–13.5)
PMV BLD AUTO: 11.6 FL (ref 8–13.5)
POTASSIUM SERPL-SCNC: 3.5 MMOL/L (ref 3.7–5.3)
POTASSIUM SERPL-SCNC: 4.5 MMOL/L (ref 3.7–5.3)
PROT SERPL-MCNC: 7.3 G/DL (ref 6.6–8.7)
PROT UR STRIP-MCNC: NEGATIVE MG/DL
PROTHROMBIN TIME: 11.8 SEC (ref 11.8–14.6)
RBC # BLD AUTO: 4.42 M/UL (ref 3.95–5.11)
RBC # BLD AUTO: 4.66 M/UL (ref 3.95–5.11)
SODIUM SERPL-SCNC: 142 MMOL/L (ref 136–145)
SODIUM SERPL-SCNC: 143 MMOL/L (ref 136–145)
SP GR UR STRIP: 1.01 (ref 1–1.03)
UROBILINOGEN UR STRIP-ACNC: NORMAL EU/DL (ref 0–1)
WBC OTHER # BLD: 5.5 K/UL (ref 3.5–11)
WBC OTHER # BLD: 9.3 K/UL (ref 3.5–11)

## 2025-07-17 PROCEDURE — 85610 PROTHROMBIN TIME: CPT

## 2025-07-17 PROCEDURE — 1200000000 HC SEMI PRIVATE

## 2025-07-17 PROCEDURE — 85025 COMPLETE CBC W/AUTO DIFF WBC: CPT

## 2025-07-17 PROCEDURE — 82947 ASSAY GLUCOSE BLOOD QUANT: CPT

## 2025-07-17 PROCEDURE — 81003 URINALYSIS AUTO W/O SCOPE: CPT

## 2025-07-17 PROCEDURE — 83690 ASSAY OF LIPASE: CPT

## 2025-07-17 PROCEDURE — 2580000003 HC RX 258: Performed by: EMERGENCY MEDICINE

## 2025-07-17 PROCEDURE — 96374 THER/PROPH/DIAG INJ IV PUSH: CPT

## 2025-07-17 PROCEDURE — 36415 COLL VENOUS BLD VENIPUNCTURE: CPT

## 2025-07-17 PROCEDURE — 99223 1ST HOSP IP/OBS HIGH 75: CPT | Performed by: INTERNAL MEDICINE

## 2025-07-17 PROCEDURE — 80076 HEPATIC FUNCTION PANEL: CPT

## 2025-07-17 PROCEDURE — 6370000000 HC RX 637 (ALT 250 FOR IP): Performed by: INTERNAL MEDICINE

## 2025-07-17 PROCEDURE — 6370000000 HC RX 637 (ALT 250 FOR IP)

## 2025-07-17 PROCEDURE — 6360000002 HC RX W HCPCS

## 2025-07-17 PROCEDURE — 80048 BASIC METABOLIC PNL TOTAL CA: CPT

## 2025-07-17 PROCEDURE — 6360000002 HC RX W HCPCS: Performed by: EMERGENCY MEDICINE

## 2025-07-17 PROCEDURE — 6370000000 HC RX 637 (ALT 250 FOR IP): Performed by: EMERGENCY MEDICINE

## 2025-07-17 RX ORDER — PRAVASTATIN SODIUM 40 MG
40 TABLET ORAL NIGHTLY
Status: DISCONTINUED | OUTPATIENT
Start: 2025-07-17 | End: 2025-07-18 | Stop reason: HOSPADM

## 2025-07-17 RX ORDER — ACETAMINOPHEN 325 MG/1
650 TABLET ORAL EVERY 6 HOURS PRN
Status: DISCONTINUED | OUTPATIENT
Start: 2025-07-17 | End: 2025-07-18 | Stop reason: HOSPADM

## 2025-07-17 RX ORDER — METOPROLOL TARTRATE 25 MG/1
25 TABLET, FILM COATED ORAL 2 TIMES DAILY
Status: DISCONTINUED | OUTPATIENT
Start: 2025-07-17 | End: 2025-07-18 | Stop reason: HOSPADM

## 2025-07-17 RX ORDER — OXYCODONE AND ACETAMINOPHEN 7.5; 325 MG/1; MG/1
1 TABLET ORAL EVERY 8 HOURS PRN
Status: DISCONTINUED | OUTPATIENT
Start: 2025-07-17 | End: 2025-07-17 | Stop reason: CLARIF

## 2025-07-17 RX ORDER — DILTIAZEM HYDROCHLORIDE 180 MG/1
360 CAPSULE, COATED, EXTENDED RELEASE ORAL DAILY
Status: DISCONTINUED | OUTPATIENT
Start: 2025-07-17 | End: 2025-07-18 | Stop reason: HOSPADM

## 2025-07-17 RX ORDER — POTASSIUM CHLORIDE 1500 MG/1
40 TABLET, EXTENDED RELEASE ORAL PRN
Status: DISCONTINUED | OUTPATIENT
Start: 2025-07-17 | End: 2025-07-18 | Stop reason: HOSPADM

## 2025-07-17 RX ORDER — OXYCODONE HYDROCHLORIDE 5 MG/1
2.5 TABLET ORAL EVERY 8 HOURS PRN
Refills: 0 | Status: DISCONTINUED | OUTPATIENT
Start: 2025-07-17 | End: 2025-07-18 | Stop reason: HOSPADM

## 2025-07-17 RX ORDER — DILTIAZEM HYDROCHLORIDE 180 MG/1
360 CAPSULE, EXTENDED RELEASE ORAL DAILY
COMMUNITY

## 2025-07-17 RX ORDER — FONDAPARINUX SODIUM 10 MG/.8ML
10 INJECTION SUBCUTANEOUS DAILY
Status: DISCONTINUED | OUTPATIENT
Start: 2025-07-17 | End: 2025-07-18 | Stop reason: HOSPADM

## 2025-07-17 RX ORDER — MIDODRINE HYDROCHLORIDE 2.5 MG/1
2.5 TABLET ORAL 3 TIMES DAILY PRN
Status: DISCONTINUED | OUTPATIENT
Start: 2025-07-17 | End: 2025-07-18 | Stop reason: HOSPADM

## 2025-07-17 RX ORDER — INSULIN LISPRO 100 [IU]/ML
0-8 INJECTION, SOLUTION INTRAVENOUS; SUBCUTANEOUS EVERY 4 HOURS
Status: DISCONTINUED | OUTPATIENT
Start: 2025-07-17 | End: 2025-07-18 | Stop reason: HOSPADM

## 2025-07-17 RX ORDER — DEXTROSE MONOHYDRATE 100 MG/ML
INJECTION, SOLUTION INTRAVENOUS CONTINUOUS PRN
Status: DISCONTINUED | OUTPATIENT
Start: 2025-07-17 | End: 2025-07-18 | Stop reason: HOSPADM

## 2025-07-17 RX ORDER — INSULIN GLARGINE 100 [IU]/ML
12 INJECTION, SOLUTION SUBCUTANEOUS NIGHTLY
Status: DISCONTINUED | OUTPATIENT
Start: 2025-07-17 | End: 2025-07-18 | Stop reason: HOSPADM

## 2025-07-17 RX ORDER — POTASSIUM CHLORIDE 7.45 MG/ML
10 INJECTION INTRAVENOUS PRN
Status: DISCONTINUED | OUTPATIENT
Start: 2025-07-17 | End: 2025-07-18 | Stop reason: HOSPADM

## 2025-07-17 RX ORDER — MAGNESIUM SULFATE HEPTAHYDRATE 40 MG/ML
2000 INJECTION, SOLUTION INTRAVENOUS PRN
Status: DISCONTINUED | OUTPATIENT
Start: 2025-07-17 | End: 2025-07-18 | Stop reason: HOSPADM

## 2025-07-17 RX ORDER — SODIUM CHLORIDE 0.9 % (FLUSH) 0.9 %
10 SYRINGE (ML) INJECTION PRN
Status: DISCONTINUED | OUTPATIENT
Start: 2025-07-17 | End: 2025-07-18 | Stop reason: HOSPADM

## 2025-07-17 RX ORDER — VALSARTAN 40 MG/1
40 TABLET ORAL DAILY
Status: DISCONTINUED | OUTPATIENT
Start: 2025-07-17 | End: 2025-07-18 | Stop reason: HOSPADM

## 2025-07-17 RX ORDER — SUCRALFATE 1 G/1
1 TABLET ORAL
Status: DISCONTINUED | OUTPATIENT
Start: 2025-07-17 | End: 2025-07-18 | Stop reason: HOSPADM

## 2025-07-17 RX ORDER — POLYETHYLENE GLYCOL 3350 17 G/17G
17 POWDER, FOR SOLUTION ORAL DAILY PRN
Status: DISCONTINUED | OUTPATIENT
Start: 2025-07-17 | End: 2025-07-18 | Stop reason: HOSPADM

## 2025-07-17 RX ORDER — SODIUM CHLORIDE 9 MG/ML
INJECTION, SOLUTION INTRAVENOUS PRN
Status: DISCONTINUED | OUTPATIENT
Start: 2025-07-17 | End: 2025-07-18 | Stop reason: HOSPADM

## 2025-07-17 RX ORDER — ONDANSETRON 2 MG/ML
4 INJECTION INTRAMUSCULAR; INTRAVENOUS EVERY 6 HOURS PRN
Status: DISCONTINUED | OUTPATIENT
Start: 2025-07-17 | End: 2025-07-18 | Stop reason: HOSPADM

## 2025-07-17 RX ORDER — BUMETANIDE 1 MG/1
2 TABLET ORAL DAILY PRN
Status: DISCONTINUED | OUTPATIENT
Start: 2025-07-17 | End: 2025-07-18 | Stop reason: HOSPADM

## 2025-07-17 RX ORDER — BISACODYL 10 MG
10 SUPPOSITORY, RECTAL RECTAL DAILY PRN
Status: DISCONTINUED | OUTPATIENT
Start: 2025-07-17 | End: 2025-07-18 | Stop reason: HOSPADM

## 2025-07-17 RX ORDER — ACETAMINOPHEN 650 MG/1
650 SUPPOSITORY RECTAL EVERY 6 HOURS PRN
Status: DISCONTINUED | OUTPATIENT
Start: 2025-07-17 | End: 2025-07-18 | Stop reason: HOSPADM

## 2025-07-17 RX ORDER — FONDAPARINUX SODIUM 10 MG/.8ML
10 INJECTION SUBCUTANEOUS DAILY
COMMUNITY

## 2025-07-17 RX ORDER — BUPROPION HYDROCHLORIDE 300 MG/1
300 TABLET ORAL EVERY MORNING
Status: DISCONTINUED | OUTPATIENT
Start: 2025-07-17 | End: 2025-07-18 | Stop reason: HOSPADM

## 2025-07-17 RX ORDER — ONDANSETRON 4 MG/1
4 TABLET, ORALLY DISINTEGRATING ORAL EVERY 8 HOURS PRN
Status: DISCONTINUED | OUTPATIENT
Start: 2025-07-17 | End: 2025-07-18 | Stop reason: HOSPADM

## 2025-07-17 RX ORDER — DEXTROSE MONOHYDRATE AND SODIUM CHLORIDE 5; .9 G/100ML; G/100ML
INJECTION, SOLUTION INTRAVENOUS CONTINUOUS
Status: DISCONTINUED | OUTPATIENT
Start: 2025-07-17 | End: 2025-07-17

## 2025-07-17 RX ORDER — OXYCODONE AND ACETAMINOPHEN 5; 325 MG/1; MG/1
1 TABLET ORAL EVERY 8 HOURS PRN
Refills: 0 | Status: DISCONTINUED | OUTPATIENT
Start: 2025-07-17 | End: 2025-07-18 | Stop reason: HOSPADM

## 2025-07-17 RX ADMIN — OXYCODONE HYDROCHLORIDE 2.5 MG: 5 TABLET ORAL at 03:36

## 2025-07-17 RX ADMIN — PANCRELIPASE LIPASE, PANCRELIPASE PROTEASE, PANCRELIPASE AMYLASE 20000 UNITS: 20000; 63000; 84000 CAPSULE, DELAYED RELEASE ORAL at 09:05

## 2025-07-17 RX ADMIN — INSULIN LISPRO 2 UNITS: 100 INJECTION, SOLUTION INTRAVENOUS; SUBCUTANEOUS at 17:54

## 2025-07-17 RX ADMIN — FLUOXETINE HYDROCHLORIDE 40 MG: 20 CAPSULE ORAL at 08:13

## 2025-07-17 RX ADMIN — DEXTROSE AND SODIUM CHLORIDE: 5; 900 INJECTION, SOLUTION INTRAVENOUS at 09:14

## 2025-07-17 RX ADMIN — Medication 16 G: at 00:15

## 2025-07-17 RX ADMIN — ONDANSETRON 4 MG: 2 INJECTION, SOLUTION INTRAMUSCULAR; INTRAVENOUS at 00:18

## 2025-07-17 RX ADMIN — FONDAPARINUX SODIUM 10 MG: 10 INJECTION, SOLUTION SUBCUTANEOUS at 08:21

## 2025-07-17 RX ADMIN — PANCRELIPASE LIPASE, PANCRELIPASE PROTEASE, PANCRELIPASE AMYLASE 40000 UNITS: 20000; 63000; 84000 CAPSULE, DELAYED RELEASE ORAL at 17:02

## 2025-07-17 RX ADMIN — BUPROPION HYDROCHLORIDE 300 MG: 300 TABLET, EXTENDED RELEASE ORAL at 08:13

## 2025-07-17 RX ADMIN — DEXTROSE AND SODIUM CHLORIDE: 5; 900 INJECTION, SOLUTION INTRAVENOUS at 01:57

## 2025-07-17 RX ADMIN — OXYCODONE HYDROCHLORIDE 2.5 MG: 5 TABLET ORAL at 20:06

## 2025-07-17 RX ADMIN — PANCRELIPASE LIPASE, PANCRELIPASE PROTEASE, PANCRELIPASE AMYLASE 20000 UNITS: 20000; 63000; 84000 CAPSULE, DELAYED RELEASE ORAL at 08:15

## 2025-07-17 RX ADMIN — METOPROLOL TARTRATE 25 MG: 25 TABLET, FILM COATED ORAL at 08:14

## 2025-07-17 RX ADMIN — OXYCODONE HYDROCHLORIDE AND ACETAMINOPHEN 1 TABLET: 5; 325 TABLET ORAL at 20:06

## 2025-07-17 RX ADMIN — OXYCODONE HYDROCHLORIDE AND ACETAMINOPHEN 1 TABLET: 5; 325 TABLET ORAL at 03:36

## 2025-07-17 RX ADMIN — METOPROLOL TARTRATE 25 MG: 25 TABLET, FILM COATED ORAL at 20:53

## 2025-07-17 RX ADMIN — OXYCODONE HYDROCHLORIDE AND ACETAMINOPHEN 1 TABLET: 5; 325 TABLET ORAL at 11:34

## 2025-07-17 RX ADMIN — VALSARTAN 40 MG: 40 TABLET, FILM COATED ORAL at 08:15

## 2025-07-17 RX ADMIN — SUCRALFATE 1 G: 1 TABLET ORAL at 20:53

## 2025-07-17 RX ADMIN — OXYCODONE HYDROCHLORIDE 2.5 MG: 5 TABLET ORAL at 11:40

## 2025-07-17 RX ADMIN — ONDANSETRON 4 MG: 4 TABLET, ORALLY DISINTEGRATING ORAL at 17:58

## 2025-07-17 RX ADMIN — PANCRELIPASE LIPASE, PANCRELIPASE PROTEASE, PANCRELIPASE AMYLASE 40000 UNITS: 20000; 63000; 84000 CAPSULE, DELAYED RELEASE ORAL at 11:50

## 2025-07-17 RX ADMIN — PRAVASTATIN SODIUM 40 MG: 40 TABLET ORAL at 20:53

## 2025-07-17 ASSESSMENT — PAIN DESCRIPTION - DESCRIPTORS
DESCRIPTORS: SHARP;BURNING
DESCRIPTORS: SHARP;BURNING
DESCRIPTORS: BURNING;SHARP
DESCRIPTORS: ACHING
DESCRIPTORS: SHARP;BURNING

## 2025-07-17 ASSESSMENT — PAIN DESCRIPTION - FREQUENCY
FREQUENCY: CONTINUOUS

## 2025-07-17 ASSESSMENT — PAIN SCALES - GENERAL
PAINLEVEL_OUTOF10: 0
PAINLEVEL_OUTOF10: 7
PAINLEVEL_OUTOF10: 5
PAINLEVEL_OUTOF10: 7
PAINLEVEL_OUTOF10: 5
PAINLEVEL_OUTOF10: 5
PAINLEVEL_OUTOF10: 6
PAINLEVEL_OUTOF10: 7
PAINLEVEL_OUTOF10: 6

## 2025-07-17 ASSESSMENT — PAIN DESCRIPTION - PAIN TYPE
TYPE: CHRONIC PAIN

## 2025-07-17 ASSESSMENT — PAIN DESCRIPTION - ONSET
ONSET: ON-GOING

## 2025-07-17 ASSESSMENT — PAIN DESCRIPTION - LOCATION
LOCATION: HIP
LOCATION: HIP;STERNUM

## 2025-07-17 ASSESSMENT — ENCOUNTER SYMPTOMS
NAUSEA: 0
VOMITING: 0
ABDOMINAL PAIN: 0

## 2025-07-17 ASSESSMENT — PAIN DESCRIPTION - ORIENTATION
ORIENTATION: LEFT

## 2025-07-17 NOTE — PLAN OF CARE
Eliana is SBA with walker to bathroom. Ambulated in hallway with RN to pop machine near elevators. No changes to pain medication dosage/frequency per MD. Discontinued D5 0.9 MIVF. Blood glucose WNL, no insulin required thus far. Dressing changed on right inner thigh with wound care supplies provided by patient. LLQ wound DEEDEE, nearly healed. Buttox wound DEEDEE, pt wearing brief. Drinking adequate amounts of fluids and eating snacks between meals.    Problem: Chronic Conditions and Co-morbidities  Goal: Patient's chronic conditions and co-morbidity symptoms are monitored and maintained or improved  Outcome: Progressing  Flowsheets  Taken 7/17/2025 0825 by Diana Yee RN  Care Plan - Patient's Chronic Conditions and Co-Morbidity Symptoms are Monitored and Maintained or Improved: Monitor and assess patient's chronic conditions and comorbid symptoms for stability, deterioration, or improvement  Taken 7/17/2025 0255 by Schober, Robyn L, RN  Care Plan - Patient's Chronic Conditions and Co-Morbidity Symptoms are Monitored and Maintained or Improved:   Monitor and assess patient's chronic conditions and comorbid symptoms for stability, deterioration, or improvement   Collaborate with multidisciplinary team to address chronic and comorbid conditions and prevent exacerbation or deterioration  Taken 7/17/2025 0246 by Schober, Robyn L, RN  Care Plan - Patient's Chronic Conditions and Co-Morbidity Symptoms are Monitored and Maintained or Improved: Monitor and assess patient's chronic conditions and comorbid symptoms for stability, deterioration, or improvement     Problem: Discharge Planning  Goal: Discharge to home or other facility with appropriate resources  Outcome: Progressing  Flowsheets  Taken 7/17/2025 0825 by Diana Yee RN  Discharge to home or other facility with appropriate resources: Identify barriers to discharge with patient and caregiver  Taken 7/17/2025 0246 by Schober, Robyn L, RN  Discharge to home or

## 2025-07-17 NOTE — PROGRESS NOTES
[facility] Internal Medicine  Raul Heard MD; Ezio Conway MD; Wilner Ash MD;  Crystal Estrada MD; Carli Mendoza MD  [facility] Internal Medicine   IN-PATIENT SERVICE  [facility]                 Date:   7/17/2025  Patientname:  Krista Chiang  Date of admission:  7/16/2025 10:41 PM  MRN:   [id number]  Account:  [id number]  YOB: 1980  PCP:    Arielle Melton APRN - NP  Room:   14/14  Code Status:    Full Code      Chief Complaint:     Chief Complaint   Patient presents with    Blood Sugar Problem     49mg/dl in triage= given 2 orange juice    Fall     Landed on left side today     History of Present Illness:     Krista Chiang is a 45 y.o. Non- / non  female who presents with Blood Sugar Problem (49mg/dl in triage= given 2 orange juice) and Fall (Landed on left side today)   and is admitted to the hospital for the management of Hypoglycemia.    Patient arrives to the ER today with complaints of hypoglycemia.  Patient has a significant medical history of hypertension, dysrhythmias post ablation in May 2025, insulin-dependent type 2 diabetes, antiphospholipid syndrome on chronic anticoagulation, chronic wounds, GERD, bipolar 1 disorder, depression, anxiety, alcohol and drug abuse.     The patient states that her blood sugars earlier today were low in the 30s and 40s.  Patient reached out to her primary care provider who told her to utilize her emergency glucagon.  Patient did utilize this but unfortunately had blood sugar drops afterward which prompted her arrival to the ED.  Patient has had multiple admissions of hypoglycemia previously.  Patient is also complaining of nausea with intermittent vomiting; but this is normal at her baseline.  The patient admits that during a hypoglycemic event earlier today she did fall and hit her left hip area.    On physical exam patient is tender of the left hip, patient states that this pain level is near

## 2025-07-17 NOTE — CARE COORDINATION
Case Management Assessment  Initial Evaluation    Date/Time of Evaluation: 7/17/2025 2:20 PM  Assessment Completed by: Helene Vernon RN    If patient is discharged prior to next notation, then this note serves as note for discharge by case management.    Patient Name: Krista Chiang                   YOB: 1980  Diagnosis: Hypoglycemia [E16.2]  Fall, initial encounter [W19.XXXA]                   Date / Time: 7/16/2025 10:41 PM    Patient Admission Status: Inpatient   Readmission Risk (Low < 19, Mod (19-27), High > 27): Readmission Risk Score: 38    Current PCP: Arielle Melton APRN - NP  PCP verified by CM? Yes    Chart Reviewed: Yes      History Provided by: Patient  Patient Orientation: Alert and Oriented    Patient Cognition: Alert    Hospitalization in the last 30 days (Readmission):  Yes    If yes, Readmission Assessment in CM Navigator will be completed.    Readmission Assessment  Number of Days since last admission?: 8-30 days  Previous Disposition: Home with Home Health  Who is being Interviewed: Patient  What was the patient's/caregiver's perception as to why they think they needed to return back to the hospital?: Other (Comment) (low blood sugar, fall)  Did you visit your Primary Care Physician after you left the hospital, before you returned this time?: Yes  Did you see a specialist, such as Cardiac, Pulmonary, Orthopedic Physician, etc. after you left the hospital?: Yes  Who advised the patient to return to the hospital?: Self-referral  Does the patient report anything that got in the way of taking their medications?: No  In our efforts to provide the best possible care to you and others like you, can you think of anything that we could have done to help you after you left the hospital the first time, so that you might not have needed to return so soon?: Other (Comment) (n/a)      Advance Directives:      Code Status: Full Code   Patient's Primary Decision Maker is: Legal

## 2025-07-17 NOTE — ED PROVIDER NOTES
Stefani SOTELO MD at [facility] ENDO    UPPER GASTROINTESTINAL ENDOSCOPY N/A 03/14/2025    ESOPHAGOGASTRODUODENOSCOPY BIOPSY WITH BALOON DILATION performed by Alfie Wilson MD at [facility] ENDO    XR MIDLINE EQUAL OR GREATER THAN 5 YEARS  04/24/2013    XR MIDLINE EQUAL OR GREATER THAN 5 YEARS     CURRENT MEDICATIONS       Previous Medications    ACETAMINOPHEN (TYLENOL) 500 MG TABLET    Take 2 tablets by mouth 4 times daily as needed for Pain    ASCORBIC ACID (VITAMIN C) 500 MG TABLET    Take 1 tablet by mouth daily    BUMETANIDE (BUMEX) 2 MG TABLET    Take 1 tablet by mouth daily as needed (For swelling)    BUPROPION (WELLBUTRIN XL) 300 MG EXTENDED RELEASE TABLET    Take 1 tablet by mouth every morning    DICYCLOMINE (BENTYL) 10 MG CAPSULE    Take 1 capsule by mouth 3 times daily (before meals)    DILTIAZEM (DILACOR XR) 180 MG EXTENDED RELEASE CAPSULE    Take 2 capsules by mouth daily    DOXYCYCLINE HYCLATE (VIBRA-TABS) 100 MG TABLET    Take 1 tablet by mouth 2 times daily    EMPAGLIFLOZIN (JARDIANCE) 10 MG TABLET    Take 1 tablet by mouth daily    FLUOXETINE (PROZAC) 40 MG CAPSULE    Take 1 capsule by mouth daily    FONDAPARINUX (ARIXTRA) 10 MG/0.8ML SOLN INJECTION    Inject 0.8 mLs into the skin daily    FONDAPARINUX (ARIXTRA) 7.5 MG/0.6ML SOLN INJECTION    Inject 0.6 mLs into the skin daily    GLUCOSE MONITORING KIT (FREESTYLE) MONITORING KIT    Testing twice a day.  Please dispense according to patients insurance.    INSULIN GLARGINE, 1 UNIT DIAL, (TOUJEO) 300 UNIT/ML CONCENTRATED INJECTION PEN    Inject 15 Units into the skin nightly    INSULIN LISPRO IJ    Inject as directed Takes 6 units with meals    INSULIN PEN NEEDLE 31G X 5 MM MISC    1 each by Does not apply route daily    LANCETS 30G MISC    Testing twice a day.  Please dispense according to patients insurance.    LANSOPRAZOLE (PREVACID) 30 MG DELAYED RELEASE CAPSULE    Take 1 capsule by mouth daily    LIPASE-PROTEASE-AMYLASE (ZENPEP) 97898-36194 UNITS CPEP DELAYED     Potassium 3.5 (*)     Chloride 110 (*)     CO2 17 (*)     Glucose 52 (*)     BUN 21 (*)     Creatinine 0.5 (*)     All other components within normal limits   CBC WITH AUTO DIFFERENTIAL - Abnormal; Notable for the following components:    Hemoglobin 11.7 (*)     MCHC 30.2 (*)     RDW 15.7 (*)     Neutrophils % 67 (*)     Lymphocytes % 23 (*)     All other components within normal limits   POC GLUCOSE FINGERSTICK - Abnormal; Notable for the following components:    POC Glucose 53 (*)     All other components within normal limits   POCT GLUCOSE - Normal   PROTIME-INR   URINALYSIS WITH REFLEX TO CULTURE   POC GLUCOSE FINGERSTICK   POCT GLUCOSE   POCT GLUCOSE   POCT GLUCOSE   POCT GLUCOSE   POCT GLUCOSE   POCT GLUCOSE   POCT GLUCOSE       Vitals Reviewed:    Vitals:    07/16/25 2239   BP: 121/61   Pulse: 78   Resp: 15   Temp: 98 °F (36.7 °C)   TempSrc: Oral   SpO2: 99%   Weight: 83.9 kg (185 lb)   Height: 1.727 m (5' 8\")       Initial Pain Score: Pain Level: 8 (07/16/25 2239)  Last Pain Score: Pain Level: 8 (07/16/25 2239)      MEDICATIONS GIVEN TO PATIENT THIS ENCOUNTER:  Orders Placed This Encounter   Medications    ondansetron (ZOFRAN) injection 4 mg    glucose chewable tablet 16 g    glucose 4 g chewable tablet     Brent Butterfield: cabinet override    dextrose 5 % and 0.9 % sodium chloride infusion     DISCHARGE PRESCRIPTIONS:  New Prescriptions    No medications on file     PHYSICIAN CONSULTS ORDERED THIS ENCOUNTER:  IP CONSULT TO INTERNAL MEDICINE  FINAL IMPRESSION      1. Hypoglycemia    2. Fall, initial encounter          DISPOSITION/PLAN   DISPOSITION Decision To Admit 07/17/2025 12:31:24 AM   DISPOSITION CONDITION Stable           OUTPATIENT FOLLOW UP THE PATIENT:  No follow-up provider specified.    DO Porfirio Begum Sami, DO  07/17/25 0106

## 2025-07-17 NOTE — ED NOTES
C= \"Have you ever felt that you should Cut down on your drinking?\"  No  A= \"Have people Annoyed you by criticizing your drinking?\"  No  G= \"Have you ever felt bad or Guilty about your drinking?\"  No  E= \"Have you ever had a drink as an Eye-opener first thing in the morning to steady your nerves or to help a hangover?\"  No      Deferred []      Reason for deferring: N/A    *If yes to two or more: probable alcohol abuse.*

## 2025-07-17 NOTE — DISCHARGE INSTR - COC
Continuity of Care Form    Patient Name: Krista Chiang   :  1980  MRN:  [id number]    Admit date:  2025  Discharge date:  25    Code Status Order: Full Code   Advance Directives:     Admitting Physician:  Carli Mendoza MD  PCP: Arielle Melton, APRN - NP    Discharging Nurse: Diana James Hospital Unit/Room#:   Discharging Unit Phone Number: [phone]    Emergency Contact:   Extended Emergency Contact Information  Primary Emergency Contact: Naima Grant  Address: [address] [address]  Home Phone: [phone]  Mobile Phone: [phone]  Relation: Parent  Secondary Emergency Contact: Zachery Grant  Home Phone: [phone]  Mobile Phone: [phone]  Relation: Brother/Sister    Past Surgical History:  Past Surgical History:   Procedure Laterality Date    ABDOMINAL HERNIA REPAIR      incisional hernia repair, complicated by infection, had multiple surgeries for this    ABDOMINAL HERNIA REPAIR  2014    ABLATION OF DYSRHYTHMIC FOCUS  2025    ANKLE FRACTURE SURGERY Left     2023  HIP IRRIGATION AND DEBRIDEMENT AND PLACEMENT OF ANTIBIOTIC IMPREGNATED CEMENT BEADS performed by Dipak Traore DO at [facility] OR    ANKLE FRACTURE SURGERY Left 10/24/2023    IRRIGATION AND DEBRIDEMENT OF LEFT HIP WITH CLOSURE performed by Dipak Traore DO at [facility] OR    ANKLE SURGERY Left 2019    ligament    ANKLE SURGERY Left 2023    IRRIGATION AND DEBRIDEMENT HIP (IRRISEPT, CELLERATE) performed by Dipak Traore DO at [facility] OR    BARIATRIC SURGERY  2013    [facility] : Awa and Y     SECTION      CHOLECYSTECTOMY      DILATION AND CURETTAGE OF UTERUS      EP DEVICE PROCEDURE N/A 2025    juan junior / Ablation SVT / no anes / julia performed by Sherman Rucker MD at [facility] CARDIAC CATH LAB    ESOPHAGOGASTRODUODENOSCOPY  2021    ESOPHAGOGASTRODUODENOSCOPY  2013

## 2025-07-17 NOTE — H&P
[facility] Internal Medicine  Raul Heard MD; Ezio Conway MD, Crystal Estrada MD,    Wilner Ash MD, Carli Mendoza MD.    [facility] Internal Medicine   IN-PATIENT SERVICE   [facility]    HISTORY AND PHYSICAL EXAMINATION            Date:   7/17/2025  Patient name:  Krista Chiang  Date of admission:  7/16/2025 10:41 PM  MRN:   [id number]  Account:  [id number]  YOB: 1980  PCP:    Arielle Melton APRN - NP  Room:   2071/2071-01  Code Status:    Full Code    Chief Complaint:     Chief Complaint   Patient presents with    Blood Sugar Problem     49mg/dl in triage= given 2 orange juice    Fall     Landed on left side today       History Obtained From:     Patient/EMR    History of Present Illness:     Krista Chiang is a 45 y.o. Non- / non  female with an extensive past medical history of essential hypertension, dyslipidemia s/p ablation(2025), antiphospholipid syndrome(chronically on Arixtra), bilateral PE and DVT s/p IVC filter placement, gastric bypass(2013), bipolar 1 disorder, depression, anxiety, alcohol and drug abuse, insulin-dependent type 2 diabetes mellitus.  Patient was admitted because of hypoglycemic episode.  Her blood glucose went as low as 30s and 40s.  An instruction of primary care provider she used her emergency glucagon but her blood sugars dropped again prompting her to come to ER.  In ER patient was started on D5 normal saline due to persistently low blood sugar levels.Patient had a similar presentation earlier as well.  She also complained of fall and hitting her left hip.  X-ray hip showed absent left femoral head cerclage wires and proximal femur and superior displacement of femur with no acute fracture.  CT head and CT cervical spine were unremarkable.      Past Medical History:     Past Medical History:   Diagnosis Date    Alcohol abuse     Recurrent episodes of withdrawal    Alcoholic hepatitis without  intracranial abnormality. CT CERVICAL SPINE No acute fracture.     CT CERVICAL SPINE WO CONTRAST  Result Date: 7/16/2025  CT BRAIN no acute intracranial abnormality. CT CERVICAL SPINE No acute fracture.       Assessment :      Hospital Problems           Last Modified POA    * (Principal) Hypoglycemia 7/17/2025 Yes       Plan:     Hypoglycemia:  Continue patient on D5 normal saline  POCT glucose every 4 hours  Hypoglycemia protocol  Patient is alert and oriented, she is eating well and blood glucose is above 70 therefore started patient on medium corrective dose  Insulin Lantus held for now  Diabetes education    Fall:  Patient fell and left hip.  X-ray hip shows absent left femoral head and no fracture noted  CT head and CT cervical spine unremarkable  Pain management with Percocet every 8 hours as needed    Hyperlipidemia  Primary hypertension  Dysrhythmias s/p ablation  Resume home medication diltiazem 180 mg, Lopressor 25 mg, valsartan 40 mg and pravastatin 40 mg    Antiphospholipid syndrome  Continue home fondaparinux    Anxiety and depression:  Continue Wellbutrin and Prozac    Bilateral lower extremity swelling:  Continue home Bumex 2 mg      Consultations:   IP CONSULT TO INTERNAL MEDICINE    Jenifer Cr MD  7/17/2025  8:36 AM    Please note that this chart was generated using voice recognition Dragon dictation software.  Although every effort was made to ensure the accuracy of this automated transcription, some errors in transcription may have occurred.   Attending Physician Statement  I have discussed the care of Krista Chinag and I have examined the patient myself and taken ROS and HPI, including pertinent history and exam findings, with the resident. I have reviewed the key elements of all parts of the encounter with the resident.  I agree with the assessment, plan and orders as documented by the resident.  45-year-old female with multiple comorbidities, multiple admissions in the hospital with

## 2025-07-17 NOTE — PROGRESS NOTES
Comprehensive Nutrition Assessment    Type and Reason for Visit:  Positive nutrition screen (wt loss, wound)    Nutrition Recommendations/Plan:   Will continue to provide 5 carbohydrate choices and add Ensure Clear (apple) to all trays     Malnutrition Assessment:  Malnutrition Status:  Moderate malnutrition (07/17/25 1430)    Context:  Chronic Illness     Findings of the 6 clinical characteristics of malnutrition:  Energy Intake:  Mild decrease in energy intake  Weight Loss:  Mild weight loss (3.1% wt loss over 11 months)     Body Fat Loss:  Mild body fat loss Orbital   Muscle Mass Loss:  Mild muscle mass loss Temples (temporalis)  Fluid Accumulation:  Mild Extremities   Strength:  Not Performed    Nutrition Assessment:    Pt was admitted due to hypoglycemia and fall. She is known to me from several past admits. She has a h/o DM, Polysubstance Abuse, Bipolar, Awa-en-Y . She states she has been losing wt steadily due to N/V over the past several months. Review of wt history shows pt is down 7# over the past 11 months however she does have signs of wasting around face and upper body. Pt likes Ensure clear (apple) supplements.    Nutrition Related Findings:    mild BLE edema, Labs: Glu 104, Meds: ZenPep Wound Type: Multiple       Current Nutrition Intake & Therapies:    Average Meal Intake: %     ADULT DIET; Regular; 5 carb choices (75 gm/meal)    Anthropometric Measures:  Height: 172.7 cm (5' 8\")  Ideal Body Weight (IBW): 140 lbs (64 kg)    Admission Body Weight: 83.9 kg (185 lb) (stated)  Current Body Weight: 94.9 kg (209 lb 3.5 oz) (obtained by RD), 149.4 % IBW. Weight Source: Bed scale  Current BMI (kg/m2): 31.8  Usual Body Weight: 98 kg (216 lb) (8/24)     % Weight Change (Calculated): -3.1                    BMI Categories: Obese Class 1 (BMI 30.0-34.9)    Estimated Daily Nutrient Needs:  Energy Requirements Based On: Formula  Weight Used for Energy Requirements: Current  Energy (kcal/day): Kemper x

## 2025-07-18 VITALS
RESPIRATION RATE: 17 BRPM | BODY MASS INDEX: 28.04 KG/M2 | HEIGHT: 68 IN | WEIGHT: 185 LBS | OXYGEN SATURATION: 97 % | HEART RATE: 62 BPM | TEMPERATURE: 98.6 F | DIASTOLIC BLOOD PRESSURE: 67 MMHG | SYSTOLIC BLOOD PRESSURE: 133 MMHG

## 2025-07-18 LAB
ANION GAP SERPL CALCULATED.3IONS-SCNC: 12 MMOL/L (ref 9–16)
BASOPHILS # BLD: 0.05 K/UL (ref 0–0.2)
BASOPHILS NFR BLD: 1 % (ref 0–2)
BUN SERPL-MCNC: 15 MG/DL (ref 6–20)
CALCIUM SERPL-MCNC: 8.5 MG/DL (ref 8.6–10.4)
CHLORIDE SERPL-SCNC: 106 MMOL/L (ref 98–107)
CO2 SERPL-SCNC: 20 MMOL/L (ref 20–31)
CREAT SERPL-MCNC: 0.5 MG/DL (ref 0.7–1.2)
EOSINOPHIL # BLD: 0.28 K/UL (ref 0–0.44)
EOSINOPHILS RELATIVE PERCENT: 5 % (ref 0–4)
ERYTHROCYTE [DISTWIDTH] IN BLOOD BY AUTOMATED COUNT: 15.9 % (ref 11.5–14.9)
GFR, ESTIMATED: >90 ML/MIN/1.73M2
GLUCOSE BLD-MCNC: 153 MG/DL (ref 65–105)
GLUCOSE BLD-MCNC: 172 MG/DL (ref 65–105)
GLUCOSE BLD-MCNC: 206 MG/DL (ref 65–105)
GLUCOSE BLD-MCNC: 255 MG/DL (ref 65–105)
GLUCOSE SERPL-MCNC: 185 MG/DL (ref 74–99)
HCT VFR BLD AUTO: 36 % (ref 36–46)
HGB BLD-MCNC: 10.5 G/DL (ref 12–16)
IMM GRANULOCYTES # BLD AUTO: 0.03 K/UL (ref 0–0.3)
IMM GRANULOCYTES NFR BLD: 1 %
LYMPHOCYTES NFR BLD: 2.13 K/UL (ref 1.1–3.7)
LYMPHOCYTES RELATIVE PERCENT: 35 % (ref 24–44)
MCH RBC QN AUTO: 26.1 PG (ref 26–34)
MCHC RBC AUTO-ENTMCNC: 29.2 G/DL (ref 31–37)
MCV RBC AUTO: 89.6 FL (ref 80–100)
MONOCYTES NFR BLD: 0.56 K/UL (ref 0.1–1.2)
MONOCYTES NFR BLD: 9 % (ref 3–12)
NEUTROPHILS NFR BLD: 49 % (ref 36–66)
NEUTS SEG NFR BLD: 3.09 K/UL (ref 1.5–8.1)
NRBC BLD-RTO: 0 PER 100 WBC
PLATELET # BLD AUTO: ABNORMAL K/UL (ref 150–450)
PLATELET, FLUORESCENCE: 258 K/UL (ref 150–450)
PLATELETS.RETICULATED NFR BLD AUTO: 7.1 % (ref 1.1–10.3)
PMV BLD AUTO: 13.5 FL (ref 8–13.5)
POTASSIUM SERPL-SCNC: 4.9 MMOL/L (ref 3.7–5.3)
RBC # BLD AUTO: 4.02 M/UL (ref 3.95–5.11)
SODIUM SERPL-SCNC: 138 MMOL/L (ref 136–145)
WBC OTHER # BLD: 6.1 K/UL (ref 3.5–11)

## 2025-07-18 PROCEDURE — 85025 COMPLETE CBC W/AUTO DIFF WBC: CPT

## 2025-07-18 PROCEDURE — 6360000002 HC RX W HCPCS

## 2025-07-18 PROCEDURE — 80048 BASIC METABOLIC PNL TOTAL CA: CPT

## 2025-07-18 PROCEDURE — 99239 HOSP IP/OBS DSCHRG MGMT >30: CPT

## 2025-07-18 PROCEDURE — 6370000000 HC RX 637 (ALT 250 FOR IP): Performed by: INTERNAL MEDICINE

## 2025-07-18 PROCEDURE — 82947 ASSAY GLUCOSE BLOOD QUANT: CPT

## 2025-07-18 PROCEDURE — 6370000000 HC RX 637 (ALT 250 FOR IP)

## 2025-07-18 PROCEDURE — 36415 COLL VENOUS BLD VENIPUNCTURE: CPT

## 2025-07-18 RX ADMIN — OXYCODONE HYDROCHLORIDE 2.5 MG: 5 TABLET ORAL at 04:13

## 2025-07-18 RX ADMIN — METOPROLOL TARTRATE 25 MG: 25 TABLET, FILM COATED ORAL at 08:36

## 2025-07-18 RX ADMIN — FONDAPARINUX SODIUM 10 MG: 10 INJECTION, SOLUTION SUBCUTANEOUS at 08:44

## 2025-07-18 RX ADMIN — PANCRELIPASE LIPASE, PANCRELIPASE PROTEASE, PANCRELIPASE AMYLASE 40000 UNITS: 20000; 63000; 84000 CAPSULE, DELAYED RELEASE ORAL at 12:03

## 2025-07-18 RX ADMIN — VALSARTAN 40 MG: 40 TABLET, FILM COATED ORAL at 08:43

## 2025-07-18 RX ADMIN — SUCRALFATE 1 G: 1 TABLET ORAL at 06:29

## 2025-07-18 RX ADMIN — FLUOXETINE HYDROCHLORIDE 40 MG: 20 CAPSULE ORAL at 08:36

## 2025-07-18 RX ADMIN — BUPROPION HYDROCHLORIDE 300 MG: 300 TABLET, EXTENDED RELEASE ORAL at 08:36

## 2025-07-18 RX ADMIN — OXYCODONE HYDROCHLORIDE 2.5 MG: 5 TABLET ORAL at 12:11

## 2025-07-18 RX ADMIN — SUCRALFATE 1 G: 1 TABLET ORAL at 12:04

## 2025-07-18 RX ADMIN — OXYCODONE HYDROCHLORIDE AND ACETAMINOPHEN 1 TABLET: 5; 325 TABLET ORAL at 12:10

## 2025-07-18 RX ADMIN — OXYCODONE HYDROCHLORIDE AND ACETAMINOPHEN 1 TABLET: 5; 325 TABLET ORAL at 04:12

## 2025-07-18 RX ADMIN — INSULIN LISPRO 2 UNITS: 100 INJECTION, SOLUTION INTRAVENOUS; SUBCUTANEOUS at 00:23

## 2025-07-18 RX ADMIN — INSULIN LISPRO 4 UNITS: 100 INJECTION, SOLUTION INTRAVENOUS; SUBCUTANEOUS at 12:25

## 2025-07-18 RX ADMIN — PANCRELIPASE LIPASE, PANCRELIPASE PROTEASE, PANCRELIPASE AMYLASE 40000 UNITS: 20000; 63000; 84000 CAPSULE, DELAYED RELEASE ORAL at 08:42

## 2025-07-18 ASSESSMENT — PAIN DESCRIPTION - ORIENTATION
ORIENTATION: LEFT

## 2025-07-18 ASSESSMENT — PAIN SCALES - GENERAL
PAINLEVEL_OUTOF10: 5
PAINLEVEL_OUTOF10: 6
PAINLEVEL_OUTOF10: 6
PAINLEVEL_OUTOF10: 5

## 2025-07-18 ASSESSMENT — ENCOUNTER SYMPTOMS
NAUSEA: 0
VOMITING: 0
ABDOMINAL PAIN: 0

## 2025-07-18 ASSESSMENT — PAIN DESCRIPTION - DESCRIPTORS
DESCRIPTORS: BURNING;SHARP
DESCRIPTORS: ACHING;BURNING;SHARP
DESCRIPTORS: BURNING;SHARP

## 2025-07-18 ASSESSMENT — PAIN DESCRIPTION - ONSET
ONSET: ON-GOING
ONSET: ON-GOING

## 2025-07-18 ASSESSMENT — PAIN - FUNCTIONAL ASSESSMENT: PAIN_FUNCTIONAL_ASSESSMENT: PREVENTS OR INTERFERES SOME ACTIVE ACTIVITIES AND ADLS

## 2025-07-18 ASSESSMENT — PAIN DESCRIPTION - LOCATION
LOCATION: HIP

## 2025-07-18 ASSESSMENT — PAIN DESCRIPTION - FREQUENCY
FREQUENCY: CONTINUOUS
FREQUENCY: CONTINUOUS

## 2025-07-18 ASSESSMENT — PAIN DESCRIPTION - PAIN TYPE
TYPE: CHRONIC PAIN
TYPE: CHRONIC PAIN

## 2025-07-18 NOTE — PROGRESS NOTES
[facility] Internal Medicine  Raul Heard MD; Ezio Conway MD, Crystal Estrada MD,    Wilner Ash MD, Carli Mendoza MD.    [facility]   IN-PATIENT SERVICE   [facility]    Progress Note            Date:   7/18/2025  Patient name:  Krista Chiang  Date of admission:  7/16/2025 10:41 PM  MRN:   [id number]  Account:  [id number]  YOB: 1980  PCP:    Arielle Melton APRN - NP  Room:   2071/2071-01  Code Status:    Full Code    Chief Complaint:     Chief Complaint   Patient presents with    Blood Sugar Problem     49mg/dl in triage= given 2 orange juice    Fall     Landed on left side today       History Obtained From:     Patient/EMR    History of Present Illness:     Krista Chiang is a 45 y.o. Non- / non  female with an extensive past medical history of essential hypertension, dyslipidemia s/p ablation(2025), antiphospholipid syndrome(chronically on Arixtra), bilateral PE and DVT s/p IVC filter placement, gastric bypass(2013), bipolar 1 disorder, depression, anxiety, alcohol and drug abuse, insulin-dependent type 2 diabetes mellitus.  Patient was admitted because of hypoglycemic episode.  Her blood glucose went as low as 30s and 40s.  An instruction of primary care provider she used her emergency glucagon but her blood sugars dropped again prompting her to come to ER.  In ER patient was started on D5 normal saline due to persistently low blood sugar levels.Patient had a similar presentation earlier as well.  She also complained of fall and hitting her left hip.  X-ray hip showed absent left femoral head cerclage wires and proximal femur and superior displacement of femur with no acute fracture.  CT head and CT cervical spine were unremarkable.      Past Medical History:     Past Medical History:   Diagnosis Date    Alcohol abuse     Recurrent episodes of withdrawal    Alcoholic hepatitis without ascites (HCC)

## 2025-07-18 NOTE — PLAN OF CARE
Problem: Discharge Planning  Goal: Discharge to home or other facility with appropriate resources  7/18/2025 0511 by Kriss Pierre, RN  Outcome: Progressing  Flowsheets (Taken 7/18/2025 0511)  Discharge to home or other facility with appropriate resources:   Identify barriers to discharge with patient and caregiver   Arrange for needed discharge resources and transportation as appropriate   Refer to discharge planning if patient needs post-hospital services based on physician order or complex needs related to functional status, cognitive ability or social support system   Identify discharge learning needs (meds, wound care, etc)  Note: Inform pt. Of discharge teaching and planned. Instructed pt. To inform me if further teaching need to be done.      Problem: Pain  Goal: Verbalizes/displays adequate comfort level or baseline comfort level  7/18/2025 0511 by Kriss Pierre, RN  Outcome: Progressing  Flowsheets (Taken 7/18/2025 0511)  Verbalizes/displays adequate comfort level or baseline comfort level:   Assess pain using appropriate pain scale   Consider cultural and social influences on pain and pain management   Notify Licensed Independent Practitioner if interventions unsuccessful or patient reports new pain   Administer analgesics based on type and severity of pain and evaluate response   Encourage patient to monitor pain and request assistance   Implement non-pharmacological measures as appropriate and evaluate response  Note: PT. Received pain meds in timely manner. Teach pt. Non-pharm. Methods to handle pain.      Problem: Safety - Adult  Goal: Free from fall injury  7/18/2025 0511 by Kriss Pierre, RN  Outcome: Progressing  Flowsheets (Taken 7/18/2025 0511)  Free From Fall Injury:   Instruct family/caregiver on patient safety   Based on caregiver fall risk screen, instruct family/caregiver to ask for assistance with transferring infant if caregiver noted to have fall risk factors  Note: Made sure

## 2025-07-18 NOTE — PLAN OF CARE
Problem: Chronic Conditions and Co-morbidities  Goal: Patient's chronic conditions and co-morbidity symptoms are monitored and maintained or improved  Outcome: Adequate for Discharge  Flowsheets (Taken 7/18/2025 0836)  Care Plan - Patient's Chronic Conditions and Co-Morbidity Symptoms are Monitored and Maintained or Improved:   Monitor and assess patient's chronic conditions and comorbid symptoms for stability, deterioration, or improvement   Collaborate with multidisciplinary team to address chronic and comorbid conditions and prevent exacerbation or deterioration   Update acute care plan with appropriate goals if chronic or comorbid symptoms are exacerbated and prevent overall improvement and discharge     Problem: Discharge Planning  Goal: Discharge to home or other facility with appropriate resources  7/18/2025 1219 by Diana Yee, RN  Outcome: Adequate for Discharge  Flowsheets (Taken 7/18/2025 0836)  Discharge to home or other facility with appropriate resources:   Identify barriers to discharge with patient and caregiver   Arrange for needed discharge resources and transportation as appropriate   Identify discharge learning needs (meds, wound care, etc)   Refer to discharge planning if patient needs post-hospital services based on physician order or complex needs related to functional status, cognitive ability or social support system  7/18/2025 0511 by Kriss Pierre, RN  Outcome: Progressing  Flowsheets (Taken 7/18/2025 0511)  Discharge to home or other facility with appropriate resources:   Identify barriers to discharge with patient and caregiver   Arrange for needed discharge resources and transportation as appropriate   Refer to discharge planning if patient needs post-hospital services based on physician order or complex needs related to functional status, cognitive ability or social support system   Identify discharge learning needs (meds, wound care, etc)  Note: Inform pt. Of discharge  teaching and planned. Instructed pt. To inform me if further teaching need to be done.      Problem: Pain  Goal: Verbalizes/displays adequate comfort level or baseline comfort level  7/18/2025 1219 by Diana Yee RN  Outcome: Adequate for Discharge  Flowsheets (Taken 7/18/2025 0915)  Verbalizes/displays adequate comfort level or baseline comfort level:   Encourage patient to monitor pain and request assistance   Assess pain using appropriate pain scale   Administer analgesics based on type and severity of pain and evaluate response   Implement non-pharmacological measures as appropriate and evaluate response   Consider cultural and social influences on pain and pain management  7/18/2025 0511 by Kriss Pierre, RN  Outcome: Progressing  Flowsheets (Taken 7/18/2025 0511)  Verbalizes/displays adequate comfort level or baseline comfort level:   Assess pain using appropriate pain scale   Consider cultural and social influences on pain and pain management   Notify Licensed Independent Practitioner if interventions unsuccessful or patient reports new pain   Administer analgesics based on type and severity of pain and evaluate response   Encourage patient to monitor pain and request assistance   Implement non-pharmacological measures as appropriate and evaluate response  Note: PT. Received pain meds in timely manner. Teach pt. Non-pharm. Methods to handle pain.      Problem: Safety - Adult  Goal: Free from fall injury  7/18/2025 1219 by Diana Yee RN  Outcome: Adequate for Discharge  7/18/2025 0511 by Kriss Pierre RN  Outcome: Progressing  Flowsheets (Taken 7/18/2025 0511)  Free From Fall Injury:   Instruct family/caregiver on patient safety   Based on caregiver fall risk screen, instruct family/caregiver to ask for assistance with transferring infant if caregiver noted to have fall risk factors  Note: Made sure call light was in reach, a clear pathway and adequate lighting was provided. Also made sure

## 2025-07-18 NOTE — PROGRESS NOTES
Reviewed dosage change of long acting insulin with patient. Additional education handouts provided. Patient discharged via wheelchair to vehicle. All belongings accounted for.

## 2025-07-18 NOTE — CARE COORDINATION
Case Management   Daily Progress Note       Patient Name: Krista Chiang                   YOB: 1980  Diagnosis: Hypoglycemia [E16.2]  Fall, initial encounter [W19.XXXA]                       GMLOS: 3 days  Length of Stay: 1  days    Readmission Risk (Low < 19, Mod (19-27), High > 27): Readmission Risk Score: 38.7      Patient is alert and oriented.    Spoke with patient, and Current Transitional Plan is:    [] Home Independently    [x] Home with HC; [facility]    [] Skilled Nursing Facility    [] Acute Rehabilitation    [] Long Term Acute Care (LTAC)    [] Other:     Medical Management: Blood glucose levels stable, plan to discharge home this afternoon.    Additional Notes:     IMM letter provided to patient.  Patient offered four hours to make informed decision regarding appeal process; patient agreeable to discharge.     Hospital follow up appointment attempted, they will reach out to the patient, patient updated.     HONG needs signed and completed.     Electronically signed by Jaqueline Matias RN on 7/18/2025 at 10:59 AM

## 2025-07-18 NOTE — CARE COORDINATION
DISCHARGE PLANNING NOTE:    Hospital follow up appointment attempted to be scheduled, however the patient's primary NP is booking out until October. Per Daniella in the office she will speak with the NP's medical assistant and reach out to the patient directly to schedule a hospital follow up appointment.    Electronically signed by Jaqueline Matias RN on 7/18/2025 at 8:59 AM     DISCHARGE

## 2025-07-22 ENCOUNTER — CARE COORDINATION (OUTPATIENT)
Dept: CARE COORDINATION | Age: 45
End: 2025-07-22

## 2025-07-22 NOTE — CARE COORDINATION
Care Transitions    I contacted Laura Cruz, PEBBLES who is patient's [facility] Care Navigator and discussed DC info with Yamil who follows this patient.  Laura is currently following Krista and was aware of her recent discharge from [facility].  We reviewed medication adjustments that navigator plans to discuss with patient.  [facility] is also following.    [facility] will not follow since Care Navigation is already in place with patient's PCP office.

## 2025-08-03 ENCOUNTER — APPOINTMENT (OUTPATIENT)
Dept: GENERAL RADIOLOGY | Age: 45
End: 2025-08-03
Payer: MEDICARE

## 2025-08-03 ENCOUNTER — HOSPITAL ENCOUNTER (EMERGENCY)
Age: 45
Discharge: HOME | End: 2025-08-03
Attending: EMERGENCY MEDICINE
Payer: MEDICARE

## 2025-08-03 VITALS
HEIGHT: 68 IN | TEMPERATURE: 98 F | OXYGEN SATURATION: 99 % | WEIGHT: 185 LBS | HEART RATE: 84 BPM | DIASTOLIC BLOOD PRESSURE: 68 MMHG | BODY MASS INDEX: 28.04 KG/M2 | SYSTOLIC BLOOD PRESSURE: 127 MMHG | RESPIRATION RATE: 18 BRPM

## 2025-08-03 DIAGNOSIS — E16.2 HYPOGLYCEMIA: Primary | ICD-10-CM

## 2025-08-03 LAB
ALBUMIN SERPL-MCNC: 3.3 G/DL (ref 3.5–5.2)
ALP SERPL-CCNC: 136 U/L (ref 35–104)
ALT SERPL-CCNC: 39 U/L (ref 10–35)
ANION GAP SERPL CALCULATED.3IONS-SCNC: 13 MMOL/L (ref 9–16)
AST SERPL-CCNC: 29 U/L (ref 10–35)
BASOPHILS # BLD: 0.07 K/UL (ref 0–0.2)
BASOPHILS NFR BLD: 1 % (ref 0–2)
BILIRUB SERPL-MCNC: <0.2 MG/DL (ref 0–1.2)
BUN SERPL-MCNC: 23 MG/DL (ref 6–20)
CALCIUM SERPL-MCNC: 8.6 MG/DL (ref 8.6–10.4)
CHLORIDE SERPL-SCNC: 109 MMOL/L (ref 98–107)
CO2 SERPL-SCNC: 19 MMOL/L (ref 20–31)
CREAT SERPL-MCNC: 0.5 MG/DL (ref 0.7–1.2)
EOSINOPHIL # BLD: 0.49 K/UL (ref 0–0.44)
EOSINOPHILS RELATIVE PERCENT: 6 % (ref 0–4)
ERYTHROCYTE [DISTWIDTH] IN BLOOD BY AUTOMATED COUNT: 15.9 % (ref 11.5–14.9)
GFR, ESTIMATED: >90 ML/MIN/1.73M2
GLUCOSE BLD-MCNC: 42 MG/DL (ref 65–105)
GLUCOSE BLD-MCNC: 90 MG/DL (ref 65–105)
GLUCOSE BLD-MCNC: 91 MG/DL (ref 65–105)
GLUCOSE SERPL-MCNC: 55 MG/DL (ref 74–99)
HCT VFR BLD AUTO: 31 % (ref 36–46)
HGB BLD-MCNC: 9.6 G/DL (ref 12–16)
IMM GRANULOCYTES # BLD AUTO: <0.03 K/UL (ref 0–0.3)
IMM GRANULOCYTES NFR BLD: 0 %
LIPASE SERPL-CCNC: 9 U/L (ref 13–60)
LYMPHOCYTES NFR BLD: 3.63 K/UL (ref 1.1–3.7)
LYMPHOCYTES RELATIVE PERCENT: 41 % (ref 24–44)
MAGNESIUM SERPL-MCNC: 2.1 MG/DL (ref 1.6–2.6)
MCH RBC QN AUTO: 27 PG (ref 26–34)
MCHC RBC AUTO-ENTMCNC: 31 G/DL (ref 31–37)
MCV RBC AUTO: 87.3 FL (ref 80–100)
MONOCYTES NFR BLD: 0.95 K/UL (ref 0.1–1.2)
MONOCYTES NFR BLD: 11 % (ref 3–12)
NEUTROPHILS NFR BLD: 41 % (ref 36–66)
NEUTS SEG NFR BLD: 3.57 K/UL (ref 1.5–8.1)
NRBC BLD-RTO: 0 PER 100 WBC
PLATELET # BLD AUTO: 449 K/UL (ref 150–450)
PMV BLD AUTO: 11.6 FL (ref 8–13.5)
POTASSIUM SERPL-SCNC: 4.1 MMOL/L (ref 3.7–5.3)
PROT SERPL-MCNC: 5.9 G/DL (ref 6.6–8.7)
RBC # BLD AUTO: 3.55 M/UL (ref 3.95–5.11)
SODIUM SERPL-SCNC: 141 MMOL/L (ref 136–145)
T4 FREE SERPL-MCNC: 1 NG/DL (ref 0.9–1.7)
TROPONIN I SERPL HS-MCNC: 9 NG/L (ref 0–14)
TSH SERPL DL<=0.05 MIU/L-ACNC: 1.68 UIU/ML (ref 0.27–4.2)
WBC OTHER # BLD: 8.7 K/UL (ref 3.5–11)

## 2025-08-03 PROCEDURE — 84484 ASSAY OF TROPONIN QUANT: CPT

## 2025-08-03 PROCEDURE — 82947 ASSAY GLUCOSE BLOOD QUANT: CPT

## 2025-08-03 PROCEDURE — 80053 COMPREHEN METABOLIC PANEL: CPT

## 2025-08-03 PROCEDURE — 84439 ASSAY OF FREE THYROXINE: CPT

## 2025-08-03 PROCEDURE — 6360000002 HC RX W HCPCS: Performed by: EMERGENCY MEDICINE

## 2025-08-03 PROCEDURE — 99285 EMERGENCY DEPT VISIT HI MDM: CPT

## 2025-08-03 PROCEDURE — 84443 ASSAY THYROID STIM HORMONE: CPT

## 2025-08-03 PROCEDURE — 96374 THER/PROPH/DIAG INJ IV PUSH: CPT

## 2025-08-03 PROCEDURE — 83690 ASSAY OF LIPASE: CPT

## 2025-08-03 PROCEDURE — 85025 COMPLETE CBC W/AUTO DIFF WBC: CPT

## 2025-08-03 PROCEDURE — 2580000003 HC RX 258: Performed by: EMERGENCY MEDICINE

## 2025-08-03 PROCEDURE — 96375 TX/PRO/DX INJ NEW DRUG ADDON: CPT

## 2025-08-03 PROCEDURE — 93005 ELECTROCARDIOGRAM TRACING: CPT | Performed by: EMERGENCY MEDICINE

## 2025-08-03 PROCEDURE — 6370000000 HC RX 637 (ALT 250 FOR IP): Performed by: EMERGENCY MEDICINE

## 2025-08-03 PROCEDURE — 73502 X-RAY EXAM HIP UNI 2-3 VIEWS: CPT

## 2025-08-03 PROCEDURE — 83735 ASSAY OF MAGNESIUM: CPT

## 2025-08-03 PROCEDURE — 36415 COLL VENOUS BLD VENIPUNCTURE: CPT

## 2025-08-03 RX ORDER — MORPHINE SULFATE 4 MG/ML
4 INJECTION, SOLUTION INTRAMUSCULAR; INTRAVENOUS
Refills: 0 | Status: COMPLETED | OUTPATIENT
Start: 2025-08-03 | End: 2025-08-03

## 2025-08-03 RX ORDER — ONDANSETRON 2 MG/ML
4 INJECTION INTRAMUSCULAR; INTRAVENOUS ONCE
Status: COMPLETED | OUTPATIENT
Start: 2025-08-03 | End: 2025-08-03

## 2025-08-03 RX ORDER — ACETAMINOPHEN 500 MG
1000 TABLET ORAL ONCE
Status: COMPLETED | OUTPATIENT
Start: 2025-08-03 | End: 2025-08-03

## 2025-08-03 RX ORDER — DIPHENHYDRAMINE HYDROCHLORIDE 50 MG/ML
25 INJECTION, SOLUTION INTRAMUSCULAR; INTRAVENOUS ONCE
Status: COMPLETED | OUTPATIENT
Start: 2025-08-03 | End: 2025-08-03

## 2025-08-03 RX ORDER — METOCLOPRAMIDE HYDROCHLORIDE 5 MG/ML
10 INJECTION INTRAMUSCULAR; INTRAVENOUS ONCE
Status: COMPLETED | OUTPATIENT
Start: 2025-08-03 | End: 2025-08-03

## 2025-08-03 RX ORDER — DEXTROSE MONOHYDRATE 100 MG/ML
INJECTION, SOLUTION INTRAVENOUS CONTINUOUS PRN
Status: DISCONTINUED | OUTPATIENT
Start: 2025-08-03 | End: 2025-08-03 | Stop reason: HOSPADM

## 2025-08-03 RX ADMIN — ONDANSETRON 4 MG: 2 INJECTION, SOLUTION INTRAMUSCULAR; INTRAVENOUS at 03:58

## 2025-08-03 RX ADMIN — MORPHINE SULFATE 4 MG: 4 INJECTION, SOLUTION INTRAMUSCULAR; INTRAVENOUS at 03:58

## 2025-08-03 RX ADMIN — METOCLOPRAMIDE 10 MG: 5 INJECTION, SOLUTION INTRAMUSCULAR; INTRAVENOUS at 05:19

## 2025-08-03 RX ADMIN — ACETAMINOPHEN 1000 MG: 500 TABLET ORAL at 05:19

## 2025-08-03 RX ADMIN — DEXTROSE 125 ML: 10 SOLUTION INTRAVENOUS at 03:13

## 2025-08-03 RX ADMIN — DIPHENHYDRAMINE HYDROCHLORIDE 25 MG: 50 INJECTION, SOLUTION INTRAMUSCULAR; INTRAVENOUS at 05:19

## 2025-08-03 ASSESSMENT — PAIN - FUNCTIONAL ASSESSMENT
PAIN_FUNCTIONAL_ASSESSMENT: NONE - DENIES PAIN
PAIN_FUNCTIONAL_ASSESSMENT: 0-10
PAIN_FUNCTIONAL_ASSESSMENT: 0-10

## 2025-08-03 ASSESSMENT — PAIN DESCRIPTION - DESCRIPTORS
DESCRIPTORS: ACHING

## 2025-08-03 ASSESSMENT — PAIN SCALES - GENERAL
PAINLEVEL_OUTOF10: 2
PAINLEVEL_OUTOF10: 7
PAINLEVEL_OUTOF10: 7
PAINLEVEL_OUTOF10: 2
PAINLEVEL_OUTOF10: 6

## 2025-08-03 ASSESSMENT — PAIN DESCRIPTION - ORIENTATION
ORIENTATION: LEFT

## 2025-08-03 ASSESSMENT — PAIN DESCRIPTION - LOCATION
LOCATION: HIP

## 2025-08-04 LAB
EKG ATRIAL RATE: 79 BPM
EKG P AXIS: 81 DEGREES
EKG P-R INTERVAL: 124 MS
EKG Q-T INTERVAL: 434 MS
EKG QRS DURATION: 92 MS
EKG QTC CALCULATION (BAZETT): 497 MS
EKG R AXIS: 56 DEGREES
EKG T AXIS: 64 DEGREES
EKG VENTRICULAR RATE: 79 BPM

## 2025-08-04 PROCEDURE — 93010 ELECTROCARDIOGRAM REPORT: CPT | Performed by: INTERNAL MEDICINE

## 2025-08-18 ENCOUNTER — HOSPITAL ENCOUNTER (OUTPATIENT)
Dept: PREADMISSION TESTING | Age: 45
Discharge: HOME | End: 2025-08-22

## 2025-08-18 VITALS — BODY MASS INDEX: 28.04 KG/M2 | WEIGHT: 185 LBS | HEIGHT: 68 IN

## 2025-08-18 RX ORDER — CARIPRAZINE 1.5 MG/1
1.5 CAPSULE, GELATIN COATED ORAL NIGHTLY
COMMUNITY
Start: 2025-07-23

## 2025-08-18 RX ORDER — MUPIROCIN 2 %
1 OINTMENT (GRAM) TOPICAL 3 TIMES DAILY
COMMUNITY
Start: 2025-08-14

## 2025-08-18 RX ORDER — ZOLPIDEM TARTRATE 10 MG/1
10 TABLET ORAL NIGHTLY PRN
COMMUNITY
Start: 2025-07-21

## 2025-08-18 RX ORDER — PROMETHAZINE HYDROCHLORIDE 25 MG/1
25 TABLET ORAL EVERY 6 HOURS PRN
COMMUNITY
Start: 2025-07-09

## 2025-08-25 ENCOUNTER — APPOINTMENT (OUTPATIENT)
Dept: CT IMAGING | Age: 45
DRG: 093 | End: 2025-08-25
Payer: MEDICARE

## 2025-08-25 ENCOUNTER — HOSPITAL ENCOUNTER (INPATIENT)
Age: 45
LOS: 2 days | Discharge: HOME HEALTH | DRG: 093 | End: 2025-08-27
Attending: EMERGENCY MEDICINE | Admitting: INTERNAL MEDICINE
Payer: MEDICARE

## 2025-08-25 DIAGNOSIS — R41.82 ALTERED MENTAL STATUS, UNSPECIFIED ALTERED MENTAL STATUS TYPE: Primary | ICD-10-CM

## 2025-08-25 DIAGNOSIS — R19.7 DIARRHEA, UNSPECIFIED TYPE: ICD-10-CM

## 2025-08-25 DIAGNOSIS — R10.84 GENERALIZED ABDOMINAL PAIN: ICD-10-CM

## 2025-08-25 PROBLEM — G93.40 ENCEPHALOPATHY: Status: ACTIVE | Noted: 2025-08-25

## 2025-08-25 LAB
ALBUMIN SERPL-MCNC: 3.2 G/DL (ref 3.5–5.2)
ALP SERPL-CCNC: 136 U/L (ref 35–104)
ALT SERPL-CCNC: 51 U/L (ref 10–35)
AMMONIA PLAS-SCNC: 11 UMOL/L (ref 11–51)
AMPHET UR QL SCN: NEGATIVE
ANION GAP SERPL CALCULATED.3IONS-SCNC: 11 MMOL/L (ref 9–16)
AST SERPL-CCNC: 45 U/L (ref 10–35)
BACTERIA URNS QL MICRO: ABNORMAL
BARBITURATES UR QL SCN: NEGATIVE
BASOPHILS # BLD: 0.03 K/UL (ref 0–0.2)
BASOPHILS NFR BLD: 1 % (ref 0–2)
BENZODIAZ UR QL: NEGATIVE
BILIRUB SERPL-MCNC: 0.3 MG/DL (ref 0–1.2)
BILIRUB UR QL STRIP: NEGATIVE
BUN SERPL-MCNC: 10 MG/DL (ref 6–20)
CALCIUM SERPL-MCNC: 8.8 MG/DL (ref 8.6–10.4)
CANNABINOIDS UR QL SCN: NEGATIVE
CASTS #/AREA URNS LPF: ABNORMAL /LPF
CHLORIDE SERPL-SCNC: 113 MMOL/L (ref 98–107)
CLARITY UR: CLEAR
CO2 SERPL-SCNC: 21 MMOL/L (ref 20–31)
COCAINE UR QL SCN: NEGATIVE
COLOR UR: YELLOW
CREAT SERPL-MCNC: 0.5 MG/DL (ref 0.7–1.2)
CRP SERPL HS-MCNC: 21.6 MG/L (ref 0–5)
EOSINOPHIL # BLD: 0.09 K/UL (ref 0–0.44)
EOSINOPHILS RELATIVE PERCENT: 2 % (ref 0–4)
EPI CELLS #/AREA URNS HPF: ABNORMAL /HPF
ERYTHROCYTE [DISTWIDTH] IN BLOOD BY AUTOMATED COUNT: 17.8 % (ref 11.5–14.9)
ERYTHROCYTE [SEDIMENTATION RATE] IN BLOOD BY PHOTOMETRIC METHOD: 16 MM/HR (ref 0–20)
ETHANOL PERCENT: 0 %
ETHANOLAMINE SERPL-MCNC: <10 MG/DL (ref 0–0.08)
FENTANYL UR QL: NEGATIVE
GFR, ESTIMATED: >90 ML/MIN/1.73M2
GLUCOSE BLD-MCNC: 84 MG/DL (ref 65–105)
GLUCOSE SERPL-MCNC: 155 MG/DL (ref 74–99)
GLUCOSE UR STRIP-MCNC: ABNORMAL MG/DL
HCT VFR BLD AUTO: 29 % (ref 36–46)
HGB BLD-MCNC: 8.7 G/DL (ref 12–16)
HGB UR QL STRIP.AUTO: NEGATIVE
IMM GRANULOCYTES # BLD AUTO: 0.03 K/UL (ref 0–0.3)
IMM GRANULOCYTES NFR BLD: 1 %
INR PPP: 0.9
KETONES UR STRIP-MCNC: NEGATIVE MG/DL
LACTATE BLDV-SCNC: 1 MMOL/L (ref 0.5–1.9)
LEUKOCYTE ESTERASE UR QL STRIP: NEGATIVE
LIPASE SERPL-CCNC: 7 U/L (ref 13–60)
LYMPHOCYTES NFR BLD: 0.94 K/UL (ref 1.1–3.7)
LYMPHOCYTES RELATIVE PERCENT: 17 % (ref 24–44)
MAGNESIUM SERPL-MCNC: 1.7 MG/DL (ref 1.6–2.6)
MCH RBC QN AUTO: 27 PG (ref 26–34)
MCHC RBC AUTO-ENTMCNC: 30 G/DL (ref 31–37)
MCV RBC AUTO: 90.1 FL (ref 80–100)
METHADONE UR QL: NEGATIVE
MONOCYTES NFR BLD: 0.39 K/UL (ref 0.1–1.2)
MONOCYTES NFR BLD: 7 % (ref 3–12)
NEUTROPHILS NFR BLD: 72 % (ref 36–66)
NEUTS SEG NFR BLD: 4.19 K/UL (ref 1.5–8.1)
NITRITE UR QL STRIP: NEGATIVE
NRBC BLD-RTO: 0 PER 100 WBC
OPIATES UR QL SCN: NEGATIVE
OXYCODONE UR QL SCN: NEGATIVE
PCP UR QL SCN: NEGATIVE
PH UR STRIP: 5.5 [PH] (ref 5–8)
PLATELET # BLD AUTO: 345 K/UL (ref 150–450)
PMV BLD AUTO: 12.4 FL (ref 8–13.5)
POTASSIUM SERPL-SCNC: 4 MMOL/L (ref 3.7–5.3)
PROT SERPL-MCNC: 6 G/DL (ref 6.6–8.7)
PROT UR STRIP-MCNC: ABNORMAL MG/DL
PROTHROMBIN TIME: 13.1 SEC (ref 11.8–14.6)
RBC # BLD AUTO: 3.22 M/UL (ref 3.95–5.11)
RBC #/AREA URNS HPF: ABNORMAL /HPF
SODIUM SERPL-SCNC: 145 MMOL/L (ref 136–145)
SP GR UR STRIP: 1.02 (ref 1–1.03)
TEST INFORMATION: NORMAL
TROPONIN I SERPL HS-MCNC: 14 NG/L (ref 0–14)
TROPONIN I SERPL HS-MCNC: 16 NG/L (ref 0–14)
UROBILINOGEN UR STRIP-ACNC: NORMAL EU/DL (ref 0–1)
WBC #/AREA URNS HPF: ABNORMAL /HPF
WBC OTHER # BLD: 5.7 K/UL (ref 3.5–11)

## 2025-08-25 PROCEDURE — 93005 ELECTROCARDIOGRAM TRACING: CPT | Performed by: EMERGENCY MEDICINE

## 2025-08-25 PROCEDURE — 36415 COLL VENOUS BLD VENIPUNCTURE: CPT

## 2025-08-25 PROCEDURE — 87040 BLOOD CULTURE FOR BACTERIA: CPT

## 2025-08-25 PROCEDURE — 96360 HYDRATION IV INFUSION INIT: CPT

## 2025-08-25 PROCEDURE — 83690 ASSAY OF LIPASE: CPT

## 2025-08-25 PROCEDURE — 83735 ASSAY OF MAGNESIUM: CPT

## 2025-08-25 PROCEDURE — 84484 ASSAY OF TROPONIN QUANT: CPT

## 2025-08-25 PROCEDURE — 82947 ASSAY GLUCOSE BLOOD QUANT: CPT

## 2025-08-25 PROCEDURE — 99285 EMERGENCY DEPT VISIT HI MDM: CPT

## 2025-08-25 PROCEDURE — 74177 CT ABD & PELVIS W/CONTRAST: CPT

## 2025-08-25 PROCEDURE — 96361 HYDRATE IV INFUSION ADD-ON: CPT

## 2025-08-25 PROCEDURE — 85025 COMPLETE CBC W/AUTO DIFF WBC: CPT

## 2025-08-25 PROCEDURE — 86140 C-REACTIVE PROTEIN: CPT

## 2025-08-25 PROCEDURE — G0480 DRUG TEST DEF 1-7 CLASSES: HCPCS

## 2025-08-25 PROCEDURE — 2060000000 HC ICU INTERMEDIATE R&B

## 2025-08-25 PROCEDURE — 80053 COMPREHEN METABOLIC PANEL: CPT

## 2025-08-25 PROCEDURE — 85652 RBC SED RATE AUTOMATED: CPT

## 2025-08-25 PROCEDURE — 81001 URINALYSIS AUTO W/SCOPE: CPT

## 2025-08-25 PROCEDURE — 83605 ASSAY OF LACTIC ACID: CPT

## 2025-08-25 PROCEDURE — 82140 ASSAY OF AMMONIA: CPT

## 2025-08-25 PROCEDURE — 70450 CT HEAD/BRAIN W/O DYE: CPT

## 2025-08-25 PROCEDURE — 2500000003 HC RX 250 WO HCPCS: Performed by: EMERGENCY MEDICINE

## 2025-08-25 PROCEDURE — 6360000004 HC RX CONTRAST MEDICATION: Performed by: EMERGENCY MEDICINE

## 2025-08-25 PROCEDURE — 2580000003 HC RX 258: Performed by: EMERGENCY MEDICINE

## 2025-08-25 PROCEDURE — 87086 URINE CULTURE/COLONY COUNT: CPT

## 2025-08-25 PROCEDURE — 99223 1ST HOSP IP/OBS HIGH 75: CPT | Performed by: INTERNAL MEDICINE

## 2025-08-25 PROCEDURE — 80307 DRUG TEST PRSMV CHEM ANLYZR: CPT

## 2025-08-25 PROCEDURE — 85610 PROTHROMBIN TIME: CPT

## 2025-08-25 RX ORDER — MAGNESIUM SULFATE HEPTAHYDRATE 40 MG/ML
2000 INJECTION, SOLUTION INTRAVENOUS PRN
Status: DISCONTINUED | OUTPATIENT
Start: 2025-08-25 | End: 2025-08-27 | Stop reason: HOSPADM

## 2025-08-25 RX ORDER — SODIUM CHLORIDE 9 MG/ML
INJECTION, SOLUTION INTRAVENOUS CONTINUOUS
Status: ACTIVE | OUTPATIENT
Start: 2025-08-25 | End: 2025-08-26

## 2025-08-25 RX ORDER — POTASSIUM CHLORIDE 7.45 MG/ML
10 INJECTION INTRAVENOUS PRN
Status: DISCONTINUED | OUTPATIENT
Start: 2025-08-25 | End: 2025-08-27 | Stop reason: HOSPADM

## 2025-08-25 RX ORDER — DILTIAZEM HYDROCHLORIDE 180 MG/1
360 CAPSULE, COATED, EXTENDED RELEASE ORAL DAILY
Status: DISCONTINUED | OUTPATIENT
Start: 2025-08-26 | End: 2025-08-27 | Stop reason: HOSPADM

## 2025-08-25 RX ORDER — SODIUM CHLORIDE 0.9 % (FLUSH) 0.9 %
5-40 SYRINGE (ML) INJECTION EVERY 12 HOURS SCHEDULED
Status: DISCONTINUED | OUTPATIENT
Start: 2025-08-25 | End: 2025-08-27 | Stop reason: HOSPADM

## 2025-08-25 RX ORDER — 0.9 % SODIUM CHLORIDE 0.9 %
100 INTRAVENOUS SOLUTION INTRAVENOUS ONCE
Status: COMPLETED | OUTPATIENT
Start: 2025-08-25 | End: 2025-08-25

## 2025-08-25 RX ORDER — ACETAMINOPHEN 650 MG/1
650 SUPPOSITORY RECTAL EVERY 6 HOURS PRN
Status: DISCONTINUED | OUTPATIENT
Start: 2025-08-25 | End: 2025-08-27 | Stop reason: HOSPADM

## 2025-08-25 RX ORDER — INSULIN GLARGINE 100 [IU]/ML
8 INJECTION, SOLUTION SUBCUTANEOUS NIGHTLY
Status: DISCONTINUED | OUTPATIENT
Start: 2025-08-25 | End: 2025-08-27 | Stop reason: HOSPADM

## 2025-08-25 RX ORDER — IOPAMIDOL 755 MG/ML
75 INJECTION, SOLUTION INTRAVASCULAR
Status: COMPLETED | OUTPATIENT
Start: 2025-08-25 | End: 2025-08-25

## 2025-08-25 RX ORDER — LIDOCAINE 50 MG/G
OINTMENT TOPICAL 3 TIMES DAILY
COMMUNITY

## 2025-08-25 RX ORDER — SODIUM CHLORIDE 0.9 % (FLUSH) 0.9 %
10 SYRINGE (ML) INJECTION PRN
Status: DISCONTINUED | OUTPATIENT
Start: 2025-08-25 | End: 2025-08-27 | Stop reason: HOSPADM

## 2025-08-25 RX ORDER — ONDANSETRON 4 MG/1
4 TABLET, ORALLY DISINTEGRATING ORAL EVERY 8 HOURS PRN
Status: DISCONTINUED | OUTPATIENT
Start: 2025-08-25 | End: 2025-08-27 | Stop reason: HOSPADM

## 2025-08-25 RX ORDER — DEXTROSE MONOHYDRATE 100 MG/ML
INJECTION, SOLUTION INTRAVENOUS CONTINUOUS PRN
Status: DISCONTINUED | OUTPATIENT
Start: 2025-08-25 | End: 2025-08-26 | Stop reason: SDUPTHER

## 2025-08-25 RX ORDER — SODIUM CHLORIDE 9 MG/ML
INJECTION, SOLUTION INTRAVENOUS PRN
Status: DISCONTINUED | OUTPATIENT
Start: 2025-08-25 | End: 2025-08-27 | Stop reason: HOSPADM

## 2025-08-25 RX ORDER — POTASSIUM CHLORIDE 1500 MG/1
40 TABLET, EXTENDED RELEASE ORAL PRN
Status: DISCONTINUED | OUTPATIENT
Start: 2025-08-25 | End: 2025-08-27 | Stop reason: HOSPADM

## 2025-08-25 RX ORDER — PANTOPRAZOLE SODIUM 40 MG/1
40 TABLET, DELAYED RELEASE ORAL
Status: DISCONTINUED | OUTPATIENT
Start: 2025-08-26 | End: 2025-08-27 | Stop reason: HOSPADM

## 2025-08-25 RX ORDER — OMEPRAZOLE 20 MG/1
20 CAPSULE, DELAYED RELEASE ORAL DAILY
Status: ON HOLD | COMMUNITY
End: 2025-08-27 | Stop reason: HOSPADM

## 2025-08-25 RX ORDER — ACETAMINOPHEN 325 MG/1
650 TABLET ORAL EVERY 6 HOURS PRN
Status: DISCONTINUED | OUTPATIENT
Start: 2025-08-25 | End: 2025-08-27 | Stop reason: HOSPADM

## 2025-08-25 RX ORDER — CEFDINIR 300 MG/1
300 CAPSULE ORAL 2 TIMES DAILY
Status: ON HOLD | COMMUNITY
Start: 2025-08-19 | End: 2025-08-27 | Stop reason: HOSPADM

## 2025-08-25 RX ORDER — INSULIN ASPART 100 [IU]/ML
5-10 INJECTION, SOLUTION INTRAVENOUS; SUBCUTANEOUS
COMMUNITY

## 2025-08-25 RX ORDER — METOPROLOL TARTRATE 25 MG/1
25 TABLET, FILM COATED ORAL 2 TIMES DAILY
Status: DISCONTINUED | OUTPATIENT
Start: 2025-08-25 | End: 2025-08-27 | Stop reason: HOSPADM

## 2025-08-25 RX ORDER — ENOXAPARIN SODIUM 100 MG/ML
40 INJECTION SUBCUTANEOUS DAILY
Status: DISCONTINUED | OUTPATIENT
Start: 2025-08-26 | End: 2025-08-27 | Stop reason: HOSPADM

## 2025-08-25 RX ORDER — PRAVASTATIN SODIUM 20 MG
40 TABLET ORAL NIGHTLY
Status: DISCONTINUED | OUTPATIENT
Start: 2025-08-25 | End: 2025-08-27 | Stop reason: HOSPADM

## 2025-08-25 RX ORDER — BACLOFEN 10 MG/1
10 TABLET ORAL 3 TIMES DAILY
COMMUNITY

## 2025-08-25 RX ORDER — MIDODRINE HYDROCHLORIDE 2.5 MG/1
2.5 TABLET ORAL 3 TIMES DAILY PRN
Status: DISCONTINUED | OUTPATIENT
Start: 2025-08-25 | End: 2025-08-27 | Stop reason: HOSPADM

## 2025-08-25 RX ORDER — MUPIROCIN 2 %
OINTMENT (GRAM) TOPICAL 3 TIMES DAILY
Status: DISCONTINUED | OUTPATIENT
Start: 2025-08-25 | End: 2025-08-27 | Stop reason: HOSPADM

## 2025-08-25 RX ORDER — 0.9 % SODIUM CHLORIDE 0.9 %
1000 INTRAVENOUS SOLUTION INTRAVENOUS ONCE
Status: COMPLETED | OUTPATIENT
Start: 2025-08-25 | End: 2025-08-25

## 2025-08-25 RX ORDER — POLYETHYLENE GLYCOL 3350 17 G/17G
17 POWDER, FOR SOLUTION ORAL DAILY PRN
Status: DISCONTINUED | OUTPATIENT
Start: 2025-08-25 | End: 2025-08-27 | Stop reason: HOSPADM

## 2025-08-25 RX ORDER — VANCOMYCIN HYDROCHLORIDE 125 MG/1
125 CAPSULE ORAL EVERY 6 HOURS SCHEDULED
Status: DISCONTINUED | OUTPATIENT
Start: 2025-08-26 | End: 2025-08-27 | Stop reason: HOSPADM

## 2025-08-25 RX ORDER — BUPROPION HYDROCHLORIDE 300 MG/1
300 TABLET ORAL EVERY MORNING
Status: DISCONTINUED | OUTPATIENT
Start: 2025-08-26 | End: 2025-08-27 | Stop reason: HOSPADM

## 2025-08-25 RX ORDER — TRAZODONE HYDROCHLORIDE 50 MG/1
TABLET ORAL
Qty: 30 TABLET | Refills: 0 | OUTPATIENT
Start: 2025-08-25

## 2025-08-25 RX ORDER — ONDANSETRON 2 MG/ML
4 INJECTION INTRAMUSCULAR; INTRAVENOUS EVERY 6 HOURS PRN
Status: DISCONTINUED | OUTPATIENT
Start: 2025-08-25 | End: 2025-08-27 | Stop reason: HOSPADM

## 2025-08-25 RX ORDER — DILTIAZEM HYDROCHLORIDE 360 MG/1
360 CAPSULE, EXTENDED RELEASE ORAL DAILY
COMMUNITY

## 2025-08-25 RX ADMIN — SODIUM CHLORIDE 100 ML: 9 INJECTION, SOLUTION INTRAVENOUS at 16:33

## 2025-08-25 RX ADMIN — SODIUM CHLORIDE, PRESERVATIVE FREE 10 ML: 5 INJECTION INTRAVENOUS at 16:31

## 2025-08-25 RX ADMIN — SODIUM CHLORIDE 1000 ML: 0.9 INJECTION, SOLUTION INTRAVENOUS at 15:34

## 2025-08-25 RX ADMIN — IOPAMIDOL 75 ML: 755 INJECTION, SOLUTION INTRAVENOUS at 16:25

## 2025-08-25 ASSESSMENT — PAIN SCALES - GENERAL: PAINLEVEL_OUTOF10: 10

## 2025-08-25 ASSESSMENT — PAIN - FUNCTIONAL ASSESSMENT: PAIN_FUNCTIONAL_ASSESSMENT: 0-10

## 2025-08-26 LAB
C DIFF GDH + TOXINS A+B STL QL IA.RAPID: ABNORMAL
DATE, STOOL #1: ABNORMAL
EKG ATRIAL RATE: 85 BPM
EKG P AXIS: 51 DEGREES
EKG P-R INTERVAL: 92 MS
EKG Q-T INTERVAL: 382 MS
EKG QRS DURATION: 84 MS
EKG QTC CALCULATION (BAZETT): 454 MS
EKG R AXIS: 61 DEGREES
EKG T AXIS: 29 DEGREES
EKG VENTRICULAR RATE: 85 BPM
GLUCOSE BLD-MCNC: 136 MG/DL (ref 65–105)
GLUCOSE BLD-MCNC: 287 MG/DL (ref 65–105)
GLUCOSE BLD-MCNC: 323 MG/DL (ref 65–105)
HEMOCCULT SP1 STL QL: POSITIVE
MICROORGANISM SPEC CULT: NORMAL
SPECIMEN DESCRIPTION: ABNORMAL
SPECIMEN DESCRIPTION: NORMAL
TIME, STOOL #1: 1750

## 2025-08-26 PROCEDURE — 36415 COLL VENOUS BLD VENIPUNCTURE: CPT

## 2025-08-26 PROCEDURE — 87324 CLOSTRIDIUM AG IA: CPT

## 2025-08-26 PROCEDURE — 93010 ELECTROCARDIOGRAM REPORT: CPT | Performed by: INTERNAL MEDICINE

## 2025-08-26 PROCEDURE — 2580000003 HC RX 258: Performed by: INTERNAL MEDICINE

## 2025-08-26 PROCEDURE — 6360000002 HC RX W HCPCS: Performed by: INTERNAL MEDICINE

## 2025-08-26 PROCEDURE — 99213 OFFICE O/P EST LOW 20 MIN: CPT

## 2025-08-26 PROCEDURE — 2060000000 HC ICU INTERMEDIATE R&B

## 2025-08-26 PROCEDURE — 97162 PT EVAL MOD COMPLEX 30 MIN: CPT

## 2025-08-26 PROCEDURE — 99233 SBSQ HOSP IP/OBS HIGH 50: CPT | Performed by: INTERNAL MEDICINE

## 2025-08-26 PROCEDURE — 6370000000 HC RX 637 (ALT 250 FOR IP): Performed by: INTERNAL MEDICINE

## 2025-08-26 PROCEDURE — 82270 OCCULT BLOOD FECES: CPT

## 2025-08-26 PROCEDURE — 87449 NOS EACH ORGANISM AG IA: CPT

## 2025-08-26 PROCEDURE — 2500000003 HC RX 250 WO HCPCS: Performed by: INTERNAL MEDICINE

## 2025-08-26 PROCEDURE — 97166 OT EVAL MOD COMPLEX 45 MIN: CPT

## 2025-08-26 PROCEDURE — 82947 ASSAY GLUCOSE BLOOD QUANT: CPT

## 2025-08-26 PROCEDURE — 97530 THERAPEUTIC ACTIVITIES: CPT

## 2025-08-26 PROCEDURE — 87506 IADNA-DNA/RNA PROBE TQ 6-11: CPT

## 2025-08-26 PROCEDURE — 87493 C DIFF AMPLIFIED PROBE: CPT

## 2025-08-26 RX ORDER — DEXTROSE MONOHYDRATE 100 MG/ML
INJECTION, SOLUTION INTRAVENOUS CONTINUOUS PRN
Status: DISCONTINUED | OUTPATIENT
Start: 2025-08-26 | End: 2025-08-27 | Stop reason: HOSPADM

## 2025-08-26 RX ORDER — INSULIN LISPRO 100 [IU]/ML
0-8 INJECTION, SOLUTION INTRAVENOUS; SUBCUTANEOUS
Status: DISCONTINUED | OUTPATIENT
Start: 2025-08-26 | End: 2025-08-27 | Stop reason: HOSPADM

## 2025-08-26 RX ORDER — SILVER SULFADIAZINE 10 MG/G
CREAM TOPICAL 3 TIMES DAILY
Status: DISCONTINUED | OUTPATIENT
Start: 2025-08-26 | End: 2025-08-27 | Stop reason: HOSPADM

## 2025-08-26 RX ADMIN — MUPIROCIN: 20 OINTMENT TOPICAL at 16:48

## 2025-08-26 RX ADMIN — METOPROLOL TARTRATE 25 MG: 25 TABLET, FILM COATED ORAL at 00:03

## 2025-08-26 RX ADMIN — DILTIAZEM HYDROCHLORIDE 360 MG: 180 CAPSULE, COATED, EXTENDED RELEASE ORAL at 08:26

## 2025-08-26 RX ADMIN — PRAVASTATIN SODIUM 40 MG: 20 TABLET ORAL at 00:03

## 2025-08-26 RX ADMIN — INSULIN GLARGINE 8 UNITS: 100 INJECTION, SOLUTION SUBCUTANEOUS at 20:46

## 2025-08-26 RX ADMIN — BUPROPION HYDROCHLORIDE 300 MG: 300 TABLET, EXTENDED RELEASE ORAL at 08:32

## 2025-08-26 RX ADMIN — VANCOMYCIN HYDROCHLORIDE 125 MG: 125 CAPSULE ORAL at 12:29

## 2025-08-26 RX ADMIN — INSULIN LISPRO 6 UNITS: 100 INJECTION, SOLUTION INTRAVENOUS; SUBCUTANEOUS at 16:48

## 2025-08-26 RX ADMIN — ACETAMINOPHEN 650 MG: 325 TABLET ORAL at 16:51

## 2025-08-26 RX ADMIN — INSULIN GLARGINE 8 UNITS: 100 INJECTION, SOLUTION SUBCUTANEOUS at 00:05

## 2025-08-26 RX ADMIN — VANCOMYCIN HYDROCHLORIDE 125 MG: 125 CAPSULE ORAL at 17:57

## 2025-08-26 RX ADMIN — SILVER SULFADIAZINE: 10 CREAM TOPICAL at 16:48

## 2025-08-26 RX ADMIN — FLUOXETINE HYDROCHLORIDE 40 MG: 20 CAPSULE ORAL at 08:27

## 2025-08-26 RX ADMIN — SODIUM CHLORIDE: 0.9 INJECTION, SOLUTION INTRAVENOUS at 00:10

## 2025-08-26 RX ADMIN — MUPIROCIN: 20 OINTMENT TOPICAL at 11:09

## 2025-08-26 RX ADMIN — SODIUM CHLORIDE, PRESERVATIVE FREE 10 ML: 5 INJECTION INTRAVENOUS at 00:03

## 2025-08-26 RX ADMIN — PRAVASTATIN SODIUM 40 MG: 20 TABLET ORAL at 20:51

## 2025-08-26 RX ADMIN — ONDANSETRON 4 MG: 4 TABLET, ORALLY DISINTEGRATING ORAL at 20:47

## 2025-08-26 RX ADMIN — ACETAMINOPHEN 650 MG: 325 TABLET ORAL at 04:17

## 2025-08-26 RX ADMIN — PANTOPRAZOLE SODIUM 40 MG: 40 TABLET, DELAYED RELEASE ORAL at 06:30

## 2025-08-26 RX ADMIN — VANCOMYCIN HYDROCHLORIDE 125 MG: 125 CAPSULE ORAL at 00:03

## 2025-08-26 RX ADMIN — METOPROLOL TARTRATE 25 MG: 25 TABLET, FILM COATED ORAL at 08:27

## 2025-08-26 RX ADMIN — METOPROLOL TARTRATE 25 MG: 25 TABLET, FILM COATED ORAL at 20:47

## 2025-08-26 RX ADMIN — ACETAMINOPHEN 650 MG: 325 TABLET ORAL at 23:08

## 2025-08-26 RX ADMIN — VANCOMYCIN HYDROCHLORIDE 125 MG: 125 CAPSULE ORAL at 06:30

## 2025-08-26 RX ADMIN — ENOXAPARIN SODIUM 40 MG: 100 INJECTION SUBCUTANEOUS at 08:27

## 2025-08-26 ASSESSMENT — PAIN SCALES - GENERAL
PAINLEVEL_OUTOF10: 6
PAINLEVEL_OUTOF10: 5
PAINLEVEL_OUTOF10: 6
PAINLEVEL_OUTOF10: 0
PAINLEVEL_OUTOF10: 0
PAINLEVEL_OUTOF10: 8
PAINLEVEL_OUTOF10: 0
PAINLEVEL_OUTOF10: 7

## 2025-08-26 ASSESSMENT — PAIN - FUNCTIONAL ASSESSMENT
PAIN_FUNCTIONAL_ASSESSMENT: ACTIVITIES ARE NOT PREVENTED
PAIN_FUNCTIONAL_ASSESSMENT: 0-10
PAIN_FUNCTIONAL_ASSESSMENT: ACTIVITIES ARE NOT PREVENTED
PAIN_FUNCTIONAL_ASSESSMENT: 0-10
PAIN_FUNCTIONAL_ASSESSMENT: ACTIVITIES ARE NOT PREVENTED
PAIN_FUNCTIONAL_ASSESSMENT: 0-10

## 2025-08-26 ASSESSMENT — PAIN DESCRIPTION - ORIENTATION
ORIENTATION: RIGHT
ORIENTATION: LEFT;RIGHT

## 2025-08-26 ASSESSMENT — PAIN DESCRIPTION - DESCRIPTORS
DESCRIPTORS: BURNING;SHARP;JABBING
DESCRIPTORS: SHARP
DESCRIPTORS: BURNING;SHARP

## 2025-08-26 ASSESSMENT — PAIN DESCRIPTION - LOCATION
LOCATION: LEG
LOCATION: LEG;BACK
LOCATION: HIP

## 2025-08-27 VITALS
OXYGEN SATURATION: 92 % | BODY MASS INDEX: 28.12 KG/M2 | DIASTOLIC BLOOD PRESSURE: 65 MMHG | RESPIRATION RATE: 18 BRPM | TEMPERATURE: 98.2 F | WEIGHT: 184.97 LBS | SYSTOLIC BLOOD PRESSURE: 114 MMHG | HEART RATE: 60 BPM

## 2025-08-27 LAB
C DIFFICILE TOXINS, PCR: ABNORMAL
CAMPYLOBACTER DNA SPEC NAA+PROBE: NORMAL
ETEC ELTA+ESTB GENES STL QL NAA+PROBE: NORMAL
GLUCOSE BLD-MCNC: 123 MG/DL (ref 65–105)
P SHIGELLOIDES DNA STL QL NAA+PROBE: NORMAL
SALMONELLA DNA SPEC QL NAA+PROBE: NORMAL
SHIGA TOXIN STX GENE SPEC NAA+PROBE: NORMAL
SHIGELLA DNA SPEC QL NAA+PROBE: NORMAL
SPECIMEN DESCRIPTION: ABNORMAL
SPECIMEN DESCRIPTION: NORMAL
V CHOL+PARA RFBL+TRKH+TNAA STL QL NAA+PR: NORMAL
Y ENTERO RECN STL QL NAA+PROBE: NORMAL

## 2025-08-27 PROCEDURE — 97535 SELF CARE MNGMENT TRAINING: CPT

## 2025-08-27 PROCEDURE — 97110 THERAPEUTIC EXERCISES: CPT

## 2025-08-27 PROCEDURE — 6360000002 HC RX W HCPCS

## 2025-08-27 PROCEDURE — 99239 HOSP IP/OBS DSCHRG MGMT >30: CPT | Performed by: INTERNAL MEDICINE

## 2025-08-27 PROCEDURE — 2500000003 HC RX 250 WO HCPCS: Performed by: INTERNAL MEDICINE

## 2025-08-27 PROCEDURE — 97530 THERAPEUTIC ACTIVITIES: CPT

## 2025-08-27 PROCEDURE — 6370000000 HC RX 637 (ALT 250 FOR IP): Performed by: INTERNAL MEDICINE

## 2025-08-27 PROCEDURE — 82947 ASSAY GLUCOSE BLOOD QUANT: CPT

## 2025-08-27 PROCEDURE — 6360000002 HC RX W HCPCS: Performed by: INTERNAL MEDICINE

## 2025-08-27 PROCEDURE — 97116 GAIT TRAINING THERAPY: CPT

## 2025-08-27 RX ORDER — KETOROLAC TROMETHAMINE 30 MG/ML
30 INJECTION, SOLUTION INTRAMUSCULAR; INTRAVENOUS ONCE
Status: COMPLETED | OUTPATIENT
Start: 2025-08-27 | End: 2025-08-27

## 2025-08-27 RX ORDER — OXYCODONE AND ACETAMINOPHEN 5; 325 MG/1; MG/1
1 TABLET ORAL EVERY 8 HOURS PRN
Refills: 0 | Status: DISCONTINUED | OUTPATIENT
Start: 2025-08-27 | End: 2025-08-27 | Stop reason: HOSPADM

## 2025-08-27 RX ORDER — OXYCODONE HYDROCHLORIDE 5 MG/1
2.5 TABLET ORAL EVERY 8 HOURS PRN
Refills: 0 | Status: DISCONTINUED | OUTPATIENT
Start: 2025-08-27 | End: 2025-08-27 | Stop reason: HOSPADM

## 2025-08-27 RX ORDER — OXYCODONE AND ACETAMINOPHEN 7.5; 325 MG/1; MG/1
1 TABLET ORAL EVERY 8 HOURS PRN
Refills: 0 | Status: DISCONTINUED | OUTPATIENT
Start: 2025-08-27 | End: 2025-08-27 | Stop reason: CLARIF

## 2025-08-27 RX ORDER — VANCOMYCIN HYDROCHLORIDE 125 MG/1
125 CAPSULE ORAL 4 TIMES DAILY
Qty: 40 CAPSULE | Refills: 0 | Status: SHIPPED | OUTPATIENT
Start: 2025-08-27 | End: 2025-09-06

## 2025-08-27 RX ADMIN — DILTIAZEM HYDROCHLORIDE 360 MG: 180 CAPSULE, COATED, EXTENDED RELEASE ORAL at 09:04

## 2025-08-27 RX ADMIN — BUPROPION HYDROCHLORIDE 300 MG: 300 TABLET, EXTENDED RELEASE ORAL at 09:04

## 2025-08-27 RX ADMIN — SILVER SULFADIAZINE: 10 CREAM TOPICAL at 09:05

## 2025-08-27 RX ADMIN — SODIUM CHLORIDE, PRESERVATIVE FREE 10 ML: 5 INJECTION INTRAVENOUS at 09:04

## 2025-08-27 RX ADMIN — PANCRELIPASE LIPASE, PANCRELIPASE PROTEASE, PANCRELIPASE AMYLASE 20000 UNITS: 20000; 63000; 84000 CAPSULE, DELAYED RELEASE ORAL at 11:12

## 2025-08-27 RX ADMIN — VANCOMYCIN HYDROCHLORIDE 125 MG: 125 CAPSULE ORAL at 00:02

## 2025-08-27 RX ADMIN — PANTOPRAZOLE SODIUM 40 MG: 40 TABLET, DELAYED RELEASE ORAL at 05:55

## 2025-08-27 RX ADMIN — METOPROLOL TARTRATE 25 MG: 25 TABLET, FILM COATED ORAL at 09:04

## 2025-08-27 RX ADMIN — SILVER SULFADIAZINE: 10 CREAM TOPICAL at 00:01

## 2025-08-27 RX ADMIN — KETOROLAC TROMETHAMINE 30 MG: 30 INJECTION, SOLUTION INTRAMUSCULAR at 04:47

## 2025-08-27 RX ADMIN — OXYCODONE HYDROCHLORIDE 2.5 MG: 5 TABLET ORAL at 11:11

## 2025-08-27 RX ADMIN — VANCOMYCIN HYDROCHLORIDE 125 MG: 125 CAPSULE ORAL at 05:55

## 2025-08-27 RX ADMIN — VANCOMYCIN HYDROCHLORIDE 125 MG: 125 CAPSULE ORAL at 12:36

## 2025-08-27 RX ADMIN — MUPIROCIN: 20 OINTMENT TOPICAL at 09:05

## 2025-08-27 RX ADMIN — OXYCODONE HYDROCHLORIDE AND ACETAMINOPHEN 1 TABLET: 5; 325 TABLET ORAL at 11:11

## 2025-08-27 RX ADMIN — ENOXAPARIN SODIUM 40 MG: 100 INJECTION SUBCUTANEOUS at 09:04

## 2025-08-27 RX ADMIN — FLUOXETINE HYDROCHLORIDE 40 MG: 20 CAPSULE ORAL at 09:04

## 2025-08-27 RX ADMIN — MUPIROCIN: 20 OINTMENT TOPICAL at 00:01

## 2025-08-27 ASSESSMENT — PAIN DESCRIPTION - ORIENTATION: ORIENTATION: LEFT

## 2025-08-27 ASSESSMENT — PAIN SCALES - GENERAL
PAINLEVEL_OUTOF10: 4
PAINLEVEL_OUTOF10: 8
PAINLEVEL_OUTOF10: 5
PAINLEVEL_OUTOF10: 8

## 2025-08-27 ASSESSMENT — PAIN DESCRIPTION - DESCRIPTORS
DESCRIPTORS: BURNING;SHARP;JABBING
DESCRIPTORS: ACHING;DISCOMFORT

## 2025-08-27 ASSESSMENT — PAIN DESCRIPTION - LOCATION
LOCATION: LEG;HIP
LOCATION: BACK;LEG

## 2025-08-27 ASSESSMENT — PAIN - FUNCTIONAL ASSESSMENT
PAIN_FUNCTIONAL_ASSESSMENT: 0-10
PAIN_FUNCTIONAL_ASSESSMENT: ACTIVITIES ARE NOT PREVENTED
PAIN_FUNCTIONAL_ASSESSMENT: 0-10

## 2025-08-28 ENCOUNTER — CARE COORDINATION (OUTPATIENT)
Dept: CARE COORDINATION | Age: 45
End: 2025-08-28

## 2025-08-30 LAB
MICROORGANISM SPEC CULT: NORMAL
SERVICE CMNT-IMP: NORMAL
SPECIMEN DESCRIPTION: NORMAL

## 2025-08-31 LAB
MICROORGANISM SPEC CULT: NORMAL
SERVICE CMNT-IMP: NORMAL
SPECIMEN DESCRIPTION: NORMAL

## (undated) DEVICE — SVMMC ORTH SPL DRP PK

## (undated) DEVICE — SPONGE LAP W18XL18IN WHT COT 4 PLY FLD STRUNG RADPQ DISP ST 2 PER PACK

## (undated) DEVICE — Device

## (undated) DEVICE — SYRINGE IRRIG 60ML SFT PLIABLE BLB EZ TO GRP 1 HND USE W/

## (undated) DEVICE — GLOVE ORANGE PI 8   MSG9080

## (undated) DEVICE — BITEBLOCK 54FR W/ DENT RIM BLOX

## (undated) DEVICE — SUTURE PDS II SZ 1 L36IN ABSRB VLT L36MM CT-1 1/2 CIR Z347H

## (undated) DEVICE — SUTURE ABSORBABLE BRAIDED 2-0 CT-1 27 IN UD VICRYL J259H

## (undated) DEVICE — GLOVE ORTHO 8   MSG9480

## (undated) DEVICE — ENDO KIT W/SYRINGE: Brand: MEDLINE INDUSTRIES, INC.

## (undated) DEVICE — SOLUTION IRRIG 3000ML 0.9% SOD CHL USP UROMATIC PLAS CONT

## (undated) DEVICE — DRESSING FOAM W4XL4IN AG SIL FACE BORD IONIC ANTIMIC ADH

## (undated) DEVICE — 1016 S-DRAPE IRRIG POUCH 10/BOX: Brand: STERI-DRAPE™

## (undated) DEVICE — FORCEPS BX 240CM 2.4MM L NDL RAD JAW 4 M00513334

## (undated) DEVICE — GLOVE SURG SZ 8 L12IN FNGR THK79MIL GRN LTX FREE

## (undated) DEVICE — STOCKINETTE,IMPERVIOUS,12X48,STERILE: Brand: MEDLINE

## (undated) DEVICE — PREMIUM DRY TRAY LF: Brand: MEDLINE INDUSTRIES, INC.

## (undated) DEVICE — SUTURE MCRYL SZ 2-0 L27IN ABSRB UD SH L26MM TAPERPOINT NDL Y417H

## (undated) DEVICE — BAG WND LAV 1L CLR ETH ACET ACID SOD ACETT BENZALKONIUM CHL

## (undated) DEVICE — GOWN,AURORA,NONREINFORCED,LARGE: Brand: MEDLINE

## (undated) DEVICE — SUTURE ETHBND EXCEL SZ 5 L30IN NONABSORBABLE GRN L48MM CCS MB47G

## (undated) DEVICE — OPTIFOAM GENTLE AG,POST-OP STRIP,3.5X14: Brand: MEDLINE

## (undated) DEVICE — YANKAUER,FLEXIBLE HANDLE,REGLR CAPACITY: Brand: MEDLINE INDUSTRIES, INC.

## (undated) DEVICE — 3M™ IOBAN™ 2 ANTIMICROBIAL INCISE DRAPE 6651EZ: Brand: IOBAN™ 2

## (undated) DEVICE — HANDPIECE SET WITH COAXIAL HIGH FLOW TIP AND SUCTION TUBE: Brand: INTERPULSE

## (undated) DEVICE — GLOVE SURG SZ 75 L12IN FNGR THK79MIL GRN LTX FREE

## (undated) DEVICE — STRAP,POSITIONING,KNEE/BODY,FOAM,4X60": Brand: MEDLINE

## (undated) DEVICE — BANDAGE COMPR W6INXL12FT SMOOTH FOR LIMB EXSANG ESMARCH

## (undated) DEVICE — STRAP ARMBRD W1.5XL32IN FOAM STR YET SFT W/ HK AND LOOP

## (undated) DEVICE — SUTURE MCRYL SZ 3-0 L27IN ABSRB UD L24MM PS-1 3/8 CIR PRIM Y936H

## (undated) DEVICE — ZIPPERED TOGA, X-LARGE: Brand: FLYTE

## (undated) DEVICE — POUCH STRL SELF-SEAL 3.5X9IN

## (undated) DEVICE — DEFENDO AIR WATER SUCTION AND BIOPSY VALVE KIT FOR  OLYMPUS: Brand: DEFENDO AIR/WATER/SUCTION AND BIOPSY VALVE

## (undated) DEVICE — DRAPE,U/ SHT,SPLIT,PLAS,STERIL: Brand: MEDLINE

## (undated) DEVICE — CASSETTE THER 38 MM W/ INSTILLATION TBNG VAC VERALINK

## (undated) DEVICE — CATHETER EP LG 2-8-2 MM 7 FRX95 CM LIVEWIRE

## (undated) DEVICE — SOLUTION IRRIG 1000ML STRL H2O USP PLAS POUR BTL

## (undated) DEVICE — 3M™ IOBAN™ 2 ANTIMICROBIAL INCISE DRAPE 6650EZ: Brand: IOBAN™ 2

## (undated) DEVICE — BANDAGE COBAN 6 IN WND 6INX5YD FOAM

## (undated) DEVICE — CEMENT MIXING SYSTEM WITH FEMORAL BREAKWAY NOZZLE: Brand: REVOLUTION

## (undated) DEVICE — 2108 SERIES SAGITTAL BLADE, NO OFFSET  (24.8 X 1.32 X 87.3MM)

## (undated) DEVICE — CANISTER NEG PRSS 1000ML W/ GEL INFOVAC

## (undated) DEVICE — 450 ML BOTTLE OF 0.05% CHLORHEXIDINE GLUCONATE IN 99.95% STERILE WATER FOR IRRIGATION, USP AND APPLICATOR.: Brand: IRRISEPT ANTIMICROBIAL WOUND LAVAGE

## (undated) DEVICE — GOWN,AURORA,NONRNF,XL,30/CS: Brand: MEDLINE

## (undated) DEVICE — DRESSING NEG PRSS 20CM PREVENA

## (undated) DEVICE — APPLICATOR MEDICATED 10.5 CC SOLUTION HI LT ORNG CHLORAPREP

## (undated) DEVICE — ESOPHAGEAL/PYLORIC/COLONIC WIREGUIDED BALLOON DILATATION CATHETER: Brand: CRE WIREGUIDED

## (undated) DEVICE — V.A.C. VERAFLO CLEANSE CHOICE™ DRESSING LARGE: Brand: V.A.C. VERAFLO CLEANSE CHOICE™

## (undated) DEVICE — SUTURE PDS + SZ 0 L36IN ABSRB VLT CT 1 L36MM 1 2 CIR PDP346H

## (undated) DEVICE — BANDAGE COMPR W6INXL15YD WHT BGE POLY COT WV E HK LOOP CLSR

## (undated) DEVICE — SUTURE ETHBND EXCEL SZ 5 L30IN NONABSORBABLE GRN L48MM V-40 MB46G

## (undated) DEVICE — GLOVE ORANGE PI 7   MSG9070

## (undated) DEVICE — INTRODUCER SHTH 7FR CANN L11CM 0.038IN STD ORNG W/ MINI

## (undated) DEVICE — YANKUER SUCTION TUBE HALF CRVD 14IN

## (undated) DEVICE — CATHETER ABLAT D-F CRV BIDIR TACTICATH CNTCT FORC SENS

## (undated) DEVICE — DRESSING PETRO W3XL8IN OIL EMUL N ADH GZ KNIT IMPREG CELOS

## (undated) DEVICE — POUCH INSTR W6.75XL11.5IN FRST 2 PKT ADH FOR ORTH AND

## (undated) DEVICE — SHEET,DRAPE,70X100,STERILE: Brand: MEDLINE

## (undated) DEVICE — DRESSING ALGINATE POST OPERATIVE 12X3.5 IN RECT PRIMASEAL

## (undated) DEVICE — C-ARM: Brand: UNBRANDED

## (undated) DEVICE — CYSTO/BLADDER IRRIGATION SET, REGULATING CLAMP

## (undated) DEVICE — NEEDLE SUT SZ 3 MAYO 1 2 CIR TAPR PNT DISPOSABLE

## (undated) DEVICE — FORCEPS BX L240CM WRK CHN 2.8MM STD CAP W/ NDL MIC MESH

## (undated) DEVICE — SUTURE PDS II SZ 0 L27IN ABSRB VLT L36MM CT-1 1/2 CIR Z340H

## (undated) DEVICE — INTRODUCER SHTH AD L11CM DIA 9FR STD BLK S STL PERIPH BRACH

## (undated) DEVICE — SOLUTION IRRIG 1000ML 0.9% SOD CHL USP POUR PLAS BTL

## (undated) DEVICE — NEPTUNE E-SEP SMOKE EVACUATION PENCIL, COATED, 70MM BLADE, ROCKER SWITCH: Brand: NEPTUNE E-SEP

## (undated) DEVICE — IMPLANTABLE DEVICE
Type: IMPLANTABLE DEVICE | Site: HIP | Status: NON-FUNCTIONAL
Brand: MICROAIRE®
Removed: 2023-07-19

## (undated) DEVICE — GOWN,SIRUS,NONRNF,SETINSLV,2XL,18/CS: Brand: MEDLINE

## (undated) DEVICE — C-ARMOR C-ARM EQUIPMENT COVERS CLEAR STERILE UNIVERSAL FIT 12 PER CASE: Brand: C-ARMOR

## (undated) DEVICE — SURGICAL SUCTION CONNECTING TUBE WITH MALE CONNECTOR AND SUCTION CLAMP, 2 FT. LONG (.6 M), 5 MM I.D.: Brand: CONMED

## (undated) DEVICE — SYRINGE INFL 60ML DISP ALLIANCE II

## (undated) DEVICE — 4-PORT MANIFOLD: Brand: NEPTUNE 2

## (undated) DEVICE — SHEET, ORTHO, SPLIT, STERILE: Brand: MEDLINE

## (undated) DEVICE — CONNECTOR,TUBING,5-IN-1,NON-STERILE: Brand: MEDLINE INDUSTRIES, INC.

## (undated) DEVICE — BLANKET WRM W29.9XL79.1IN UP BODY FORC AIR MISTRAL-AIR

## (undated) DEVICE — SYSTEM WND THER 14 DAY 150 CC CANSTR THER UNIT PREVENA + 125

## (undated) DEVICE — KIT EVAC 400CC DIA2.3MM PVC RELIABLE SUCT DRNGE 3 SPR RND H

## (undated) DEVICE — TUBING SET PERISTALTIC 260 CM IRRIGATION PMP COOL PATH

## (undated) DEVICE — SUTURE NONABSORBABLE MONOFILAMENT 3-0 PS-1 18 IN BLK ETHILON 1663H

## (undated) DEVICE — PILLOW ABDUCTION LEG DISP

## (undated) DEVICE — GLOVE ORANGE PI 8 1/2   MSG9085

## (undated) DEVICE — ZIMMER® STERILE DISPOSABLE TOURNIQUET CUFF WITH PROTECTIVE SLEEVE AND PLC, DUAL PORT, SINGLE BLADDER, 24 IN. (61 CM)

## (undated) DEVICE — DISK EXT FIX TENS

## (undated) DEVICE — BANDAGE,GAUZE,BULKEE II,4.5"X4.1YD,STRL: Brand: MEDLINE

## (undated) DEVICE — ZIMMER® STERILE DISPOSABLE TOURNIQUET CUFF WITH PROTECTIVE SLEEVE AND PLC, DUAL PORT, SINGLE BLADDER, 34 IN. (86 CM)

## (undated) DEVICE — TUBING, SUCTION, 9/32" X 20', STRAIGHT: Brand: MEDLINE INDUSTRIES, INC.

## (undated) DEVICE — APPLICATOR MEDICATED 26 CC SOLUTION HI LT ORNG CHLORAPREP

## (undated) DEVICE — SUTURE PROL SZ 1 L30IN NONABSORBABLE BLU L36MM CT-1 1/2 CIR 8425H

## (undated) DEVICE — 3M™ RED DOT™ MONITORING ELECTRODE WITH FOAM TAPE AND STICKY GEL, 5/BAG, 200/CASE, 42/PLT 2570-5: Brand: RED DOT™

## (undated) DEVICE — CELLERATE RX 5 GM SURG PWD HYDROLYZED CLLGN

## (undated) DEVICE — DRAIN CLOSED WOUND W/TROCAR 10FR MED (HEMOVAC)

## (undated) DEVICE — CATHETER EP 6FR L92CM 2-5-2MM SPC TIP 1MM 4 ELECTRD D CRV

## (undated) DEVICE — MERCY HEALTH ST CHARLES: Brand: MEDLINE INDUSTRIES, INC.

## (undated) DEVICE — GLOVE SURG SZ 75 CRM LTX FREE POLYISOPRENE POLYMER BEAD ANTI

## (undated) DEVICE — STOCKINETTE ORTH W6XL48IN OFF WHT SGL PLY UNBLEACHED COT RIB

## (undated) DEVICE — DRESSING NEG PRSS L W15XH3.3XL26CM BLK POLYUR DRP PD

## (undated) DEVICE — ELECTRODE PT RET AD L9FT HI MOIST COND ADH HYDRGEL CORDED

## (undated) DEVICE — ADHESIVE SKIN CLOSURE WND 8.661X1.5 IN 22 CM LIQUIBAND SECUR

## (undated) DEVICE — ZIPPERED TOGA, 2X LARGE: Brand: FLYTE

## (undated) DEVICE — 1LYRTR 16FR10ML100%SIL UMS SNP: Brand: MEDLINE INDUSTRIES, INC.

## (undated) DEVICE — SUTURE VCRL + SZ 2-0 L27IN ABSRB UD CP-1 1/2 CIR REV CUT VCP266H

## (undated) DEVICE — BLADE SAGITAL 18X90X1.27MM